# Patient Record
Sex: FEMALE | Race: WHITE | NOT HISPANIC OR LATINO | Employment: OTHER
[De-identification: names, ages, dates, MRNs, and addresses within clinical notes are randomized per-mention and may not be internally consistent; named-entity substitution may affect disease eponyms.]

---

## 2017-01-03 ENCOUNTER — RECORDS - HEALTHEAST (OUTPATIENT)
Dept: ADMINISTRATIVE | Facility: OTHER | Age: 77
End: 2017-01-03

## 2017-01-04 ENCOUNTER — AMBULATORY - HEALTHEAST (OUTPATIENT)
Dept: PALLIATIVE MEDICINE | Facility: OTHER | Age: 77
End: 2017-01-04

## 2017-01-04 DIAGNOSIS — M54.50 ACUTE EXACERBATION OF CHRONIC LOW BACK PAIN: ICD-10-CM

## 2017-01-04 DIAGNOSIS — G89.29 ACUTE EXACERBATION OF CHRONIC LOW BACK PAIN: ICD-10-CM

## 2017-01-06 ENCOUNTER — OFFICE VISIT - HEALTHEAST (OUTPATIENT)
Dept: FAMILY MEDICINE | Facility: CLINIC | Age: 77
End: 2017-01-06

## 2017-01-06 DIAGNOSIS — R22.40 LOCALIZED SWELLING OF LOWER LEG: ICD-10-CM

## 2017-01-06 DIAGNOSIS — I10 HYPERTENSION: ICD-10-CM

## 2017-01-06 DIAGNOSIS — R74.8 ELEVATED LIPASE: ICD-10-CM

## 2017-01-06 DIAGNOSIS — F41.9 ANXIETY AND DEPRESSION: ICD-10-CM

## 2017-01-06 DIAGNOSIS — F32.A ANXIETY AND DEPRESSION: ICD-10-CM

## 2017-01-09 ENCOUNTER — COMMUNICATION - HEALTHEAST (OUTPATIENT)
Dept: FAMILY MEDICINE | Facility: CLINIC | Age: 77
End: 2017-01-09

## 2017-01-10 ENCOUNTER — RECORDS - HEALTHEAST (OUTPATIENT)
Dept: ADMINISTRATIVE | Facility: OTHER | Age: 77
End: 2017-01-10

## 2017-01-13 ENCOUNTER — AMBULATORY - HEALTHEAST (OUTPATIENT)
Dept: PALLIATIVE MEDICINE | Facility: OTHER | Age: 77
End: 2017-01-13

## 2017-01-19 ENCOUNTER — RECORDS - HEALTHEAST (OUTPATIENT)
Dept: ADMINISTRATIVE | Facility: OTHER | Age: 77
End: 2017-01-19

## 2017-01-19 ENCOUNTER — COMMUNICATION - HEALTHEAST (OUTPATIENT)
Dept: FAMILY MEDICINE | Facility: CLINIC | Age: 77
End: 2017-01-19

## 2017-01-20 ENCOUNTER — HOSPITAL ENCOUNTER (OUTPATIENT)
Dept: PALLIATIVE MEDICINE | Facility: OTHER | Age: 77
Discharge: HOME | End: 2017-01-20
Attending: ANESTHESIOLOGY

## 2017-01-20 DIAGNOSIS — G89.4 CHRONIC PAIN SYNDROME: ICD-10-CM

## 2017-01-20 ASSESSMENT — MIFFLIN-ST. JEOR: SCORE: 1228.17

## 2017-01-25 ENCOUNTER — COMMUNICATION - HEALTHEAST (OUTPATIENT)
Dept: PALLIATIVE MEDICINE | Facility: OTHER | Age: 77
End: 2017-01-25

## 2017-01-25 DIAGNOSIS — R52 PAIN: ICD-10-CM

## 2017-01-31 ENCOUNTER — AMBULATORY - HEALTHEAST (OUTPATIENT)
Dept: PALLIATIVE MEDICINE | Facility: OTHER | Age: 77
End: 2017-01-31

## 2017-02-02 ENCOUNTER — OFFICE VISIT - HEALTHEAST (OUTPATIENT)
Dept: PHYSICAL THERAPY | Facility: CLINIC | Age: 77
End: 2017-02-02

## 2017-02-02 DIAGNOSIS — R10.9 ABDOMINAL PAIN, UNSPECIFIED LOCATION: ICD-10-CM

## 2017-02-02 DIAGNOSIS — M54.50 LEFT-SIDED LOW BACK PAIN WITHOUT SCIATICA, UNSPECIFIED CHRONICITY: ICD-10-CM

## 2017-02-02 DIAGNOSIS — Z90.49 HISTORY OF CHOLECYSTECTOMY: ICD-10-CM

## 2017-02-02 DIAGNOSIS — Z98.890 HISTORY OF SURGERY: ICD-10-CM

## 2017-02-03 ENCOUNTER — OFFICE VISIT - HEALTHEAST (OUTPATIENT)
Dept: PHYSICAL THERAPY | Facility: CLINIC | Age: 77
End: 2017-02-03

## 2017-02-03 DIAGNOSIS — Z90.49 HISTORY OF CHOLECYSTECTOMY: ICD-10-CM

## 2017-02-03 DIAGNOSIS — M54.50 LEFT-SIDED LOW BACK PAIN WITHOUT SCIATICA, UNSPECIFIED CHRONICITY: ICD-10-CM

## 2017-02-03 DIAGNOSIS — Z98.890 HISTORY OF SURGERY: ICD-10-CM

## 2017-02-03 DIAGNOSIS — R10.9 ABDOMINAL PAIN, UNSPECIFIED LOCATION: ICD-10-CM

## 2017-02-08 ENCOUNTER — RECORDS - HEALTHEAST (OUTPATIENT)
Dept: ADMINISTRATIVE | Facility: OTHER | Age: 77
End: 2017-02-08

## 2017-02-08 ENCOUNTER — AMBULATORY - HEALTHEAST (OUTPATIENT)
Dept: PALLIATIVE MEDICINE | Facility: OTHER | Age: 77
End: 2017-02-08

## 2017-02-09 ENCOUNTER — OFFICE VISIT - HEALTHEAST (OUTPATIENT)
Dept: PHYSICAL THERAPY | Facility: CLINIC | Age: 77
End: 2017-02-09

## 2017-02-09 ENCOUNTER — AMBULATORY - HEALTHEAST (OUTPATIENT)
Dept: PALLIATIVE MEDICINE | Facility: OTHER | Age: 77
End: 2017-02-09

## 2017-02-09 DIAGNOSIS — R10.9 ABDOMINAL PAIN, UNSPECIFIED LOCATION: ICD-10-CM

## 2017-02-09 DIAGNOSIS — M54.50 LEFT-SIDED LOW BACK PAIN WITHOUT SCIATICA, UNSPECIFIED CHRONICITY: ICD-10-CM

## 2017-02-09 DIAGNOSIS — Z98.890 HISTORY OF SURGERY: ICD-10-CM

## 2017-02-09 DIAGNOSIS — Z90.49 HISTORY OF CHOLECYSTECTOMY: ICD-10-CM

## 2017-02-13 ENCOUNTER — AMBULATORY - HEALTHEAST (OUTPATIENT)
Dept: PALLIATIVE MEDICINE | Facility: OTHER | Age: 77
End: 2017-02-13

## 2017-02-14 ENCOUNTER — RECORDS - HEALTHEAST (OUTPATIENT)
Dept: ADMINISTRATIVE | Facility: OTHER | Age: 77
End: 2017-02-14

## 2017-02-15 ENCOUNTER — COMMUNICATION - HEALTHEAST (OUTPATIENT)
Dept: NURSING | Facility: CLINIC | Age: 77
End: 2017-02-15

## 2017-02-16 ENCOUNTER — OFFICE VISIT - HEALTHEAST (OUTPATIENT)
Dept: PHYSICAL THERAPY | Facility: CLINIC | Age: 77
End: 2017-02-16

## 2017-02-16 DIAGNOSIS — Z90.49 HISTORY OF CHOLECYSTECTOMY: ICD-10-CM

## 2017-02-16 DIAGNOSIS — R10.9 ABDOMINAL PAIN, UNSPECIFIED LOCATION: ICD-10-CM

## 2017-02-16 DIAGNOSIS — Z98.890 HISTORY OF SURGERY: ICD-10-CM

## 2017-02-16 DIAGNOSIS — M54.50 LEFT-SIDED LOW BACK PAIN WITHOUT SCIATICA, UNSPECIFIED CHRONICITY: ICD-10-CM

## 2017-02-17 ENCOUNTER — HOSPITAL ENCOUNTER (OUTPATIENT)
Dept: PALLIATIVE MEDICINE | Facility: OTHER | Age: 77
Discharge: HOME | End: 2017-02-17

## 2017-02-17 DIAGNOSIS — G89.4 CHRONIC PAIN SYNDROME: ICD-10-CM

## 2017-02-17 DIAGNOSIS — G90.523 COMPLEX REGIONAL PAIN SYNDROME TYPE 1 OF BOTH LOWER EXTREMITIES: ICD-10-CM

## 2017-02-17 DIAGNOSIS — M54.16 LUMBAR RADICULOPATHY: ICD-10-CM

## 2017-02-17 ASSESSMENT — MIFFLIN-ST. JEOR: SCORE: 1237.25

## 2017-02-20 ENCOUNTER — COMMUNICATION - HEALTHEAST (OUTPATIENT)
Dept: FAMILY MEDICINE | Facility: CLINIC | Age: 77
End: 2017-02-20

## 2017-02-20 DIAGNOSIS — E03.9 HYPOTHYROIDISM: ICD-10-CM

## 2017-02-21 ENCOUNTER — COMMUNICATION - HEALTHEAST (OUTPATIENT)
Dept: FAMILY MEDICINE | Facility: CLINIC | Age: 77
End: 2017-02-21

## 2017-02-21 ENCOUNTER — COMMUNICATION - HEALTHEAST (OUTPATIENT)
Dept: PALLIATIVE MEDICINE | Facility: OTHER | Age: 77
End: 2017-02-21

## 2017-02-21 ENCOUNTER — AMBULATORY - HEALTHEAST (OUTPATIENT)
Dept: PALLIATIVE MEDICINE | Facility: OTHER | Age: 77
End: 2017-02-21

## 2017-02-26 ENCOUNTER — COMMUNICATION - HEALTHEAST (OUTPATIENT)
Dept: FAMILY MEDICINE | Facility: CLINIC | Age: 77
End: 2017-02-26

## 2017-02-27 ENCOUNTER — COMMUNICATION - HEALTHEAST (OUTPATIENT)
Dept: LAB | Facility: CLINIC | Age: 77
End: 2017-02-27

## 2017-02-27 ENCOUNTER — COMMUNICATION - HEALTHEAST (OUTPATIENT)
Dept: PALLIATIVE MEDICINE | Facility: OTHER | Age: 77
End: 2017-02-27

## 2017-02-27 DIAGNOSIS — E78.2 MIXED HYPERLIPIDEMIA: ICD-10-CM

## 2017-02-27 DIAGNOSIS — I10 HYPERTENSION: ICD-10-CM

## 2017-02-27 DIAGNOSIS — K85.90 ACUTE PANCREATITIS, UNSPECIFIED COMPLICATION STATUS, UNSPECIFIED PANCREATITIS TYPE: ICD-10-CM

## 2017-02-28 ENCOUNTER — AMBULATORY - HEALTHEAST (OUTPATIENT)
Dept: LAB | Facility: CLINIC | Age: 77
End: 2017-02-28

## 2017-02-28 ENCOUNTER — HOSPITAL ENCOUNTER (OUTPATIENT)
Dept: PALLIATIVE MEDICINE | Facility: OTHER | Age: 77
Discharge: HOME | End: 2017-02-28
Attending: PAIN MEDICINE

## 2017-02-28 ENCOUNTER — COMMUNICATION - HEALTHEAST (OUTPATIENT)
Dept: LAB | Facility: CLINIC | Age: 77
End: 2017-02-28

## 2017-02-28 ENCOUNTER — COMMUNICATION - HEALTHEAST (OUTPATIENT)
Dept: FAMILY MEDICINE | Facility: CLINIC | Age: 77
End: 2017-02-28

## 2017-02-28 DIAGNOSIS — M79.2 SYMPATHETICALLY MAINTAINED PAIN: ICD-10-CM

## 2017-02-28 DIAGNOSIS — R52 VISCERAL PAIN: ICD-10-CM

## 2017-02-28 DIAGNOSIS — M54.16 LUMBAR RADICULOPATHY: ICD-10-CM

## 2017-02-28 DIAGNOSIS — K85.90 ACUTE PANCREATITIS, UNSPECIFIED COMPLICATION STATUS, UNSPECIFIED PANCREATITIS TYPE: ICD-10-CM

## 2017-02-28 DIAGNOSIS — E78.2 MIXED HYPERLIPIDEMIA: ICD-10-CM

## 2017-02-28 LAB
CHOLEST SERPL-MCNC: 258 MG/DL
FASTING STATUS PATIENT QL REPORTED: YES
HDLC SERPL-MCNC: 84 MG/DL
LDLC SERPL CALC-MCNC: 159 MG/DL
TRIGL SERPL-MCNC: 77 MG/DL

## 2017-02-28 ASSESSMENT — MIFFLIN-ST. JEOR: SCORE: 1259.93

## 2017-03-01 ENCOUNTER — OFFICE VISIT - HEALTHEAST (OUTPATIENT)
Dept: FAMILY MEDICINE | Facility: CLINIC | Age: 77
End: 2017-03-01

## 2017-03-01 DIAGNOSIS — G89.4 CHRONIC PAIN SYNDROME: ICD-10-CM

## 2017-03-01 DIAGNOSIS — K85.90 ACUTE PANCREATITIS, UNSPECIFIED COMPLICATION STATUS, UNSPECIFIED PANCREATITIS TYPE: ICD-10-CM

## 2017-03-01 DIAGNOSIS — F32.9 MAJOR DEPRESSION: ICD-10-CM

## 2017-03-01 DIAGNOSIS — G47.00 INSOMNIA: ICD-10-CM

## 2017-03-01 ASSESSMENT — MIFFLIN-ST. JEOR: SCORE: 1232.71

## 2017-03-02 ENCOUNTER — COMMUNICATION - HEALTHEAST (OUTPATIENT)
Dept: PALLIATIVE MEDICINE | Facility: OTHER | Age: 77
End: 2017-03-02

## 2017-03-03 ENCOUNTER — HOSPITAL ENCOUNTER (OUTPATIENT)
Dept: PALLIATIVE MEDICINE | Facility: OTHER | Age: 77
Discharge: HOME | End: 2017-03-03
Attending: ANESTHESIOLOGY

## 2017-03-03 DIAGNOSIS — G90.511 COMPLEX REGIONAL PAIN SYNDROME TYPE 1 OF RIGHT UPPER EXTREMITY: ICD-10-CM

## 2017-03-03 ASSESSMENT — MIFFLIN-ST. JEOR: SCORE: 1232.71

## 2017-03-06 ENCOUNTER — AMBULATORY - HEALTHEAST (OUTPATIENT)
Dept: PALLIATIVE MEDICINE | Facility: OTHER | Age: 77
End: 2017-03-06

## 2017-03-06 ENCOUNTER — COMMUNICATION - HEALTHEAST (OUTPATIENT)
Dept: FAMILY MEDICINE | Facility: CLINIC | Age: 77
End: 2017-03-06

## 2017-03-06 ENCOUNTER — RECORDS - HEALTHEAST (OUTPATIENT)
Dept: ADMINISTRATIVE | Facility: OTHER | Age: 77
End: 2017-03-06

## 2017-03-08 ENCOUNTER — COMMUNICATION - HEALTHEAST (OUTPATIENT)
Dept: PALLIATIVE MEDICINE | Facility: OTHER | Age: 77
End: 2017-03-08

## 2017-03-09 ENCOUNTER — HOSPITAL ENCOUNTER (OUTPATIENT)
Dept: PALLIATIVE MEDICINE | Facility: OTHER | Age: 77
Discharge: HOME | End: 2017-03-09
Attending: PAIN MEDICINE

## 2017-03-09 DIAGNOSIS — G90.511 COMPLEX REGIONAL PAIN SYNDROME TYPE 1 OF RIGHT UPPER EXTREMITY: ICD-10-CM

## 2017-03-09 DIAGNOSIS — M54.16 LUMBAR RADICULOPATHY: ICD-10-CM

## 2017-03-13 ENCOUNTER — COMMUNICATION - HEALTHEAST (OUTPATIENT)
Dept: PALLIATIVE MEDICINE | Facility: OTHER | Age: 77
End: 2017-03-13

## 2017-03-14 ENCOUNTER — COMMUNICATION - HEALTHEAST (OUTPATIENT)
Dept: PALLIATIVE MEDICINE | Facility: OTHER | Age: 77
End: 2017-03-14

## 2017-03-15 ENCOUNTER — HOSPITAL ENCOUNTER (OUTPATIENT)
Dept: PALLIATIVE MEDICINE | Facility: OTHER | Age: 77
Discharge: HOME | End: 2017-03-15
Attending: PAIN MEDICINE

## 2017-03-15 DIAGNOSIS — G90.50 REFLEX SYMPATHETIC DYSTROPHY: ICD-10-CM

## 2017-03-15 DIAGNOSIS — G90.511 COMPLEX REGIONAL PAIN SYNDROME TYPE 1 OF RIGHT UPPER EXTREMITY: ICD-10-CM

## 2017-03-15 ASSESSMENT — MIFFLIN-ST. JEOR: SCORE: 1232.71

## 2017-04-10 ENCOUNTER — COMMUNICATION - HEALTHEAST (OUTPATIENT)
Dept: NURSING | Facility: CLINIC | Age: 77
End: 2017-04-10

## 2017-04-14 ENCOUNTER — HOSPITAL ENCOUNTER (OUTPATIENT)
Dept: PALLIATIVE MEDICINE | Facility: OTHER | Age: 77
Discharge: HOME | End: 2017-04-14

## 2017-04-14 DIAGNOSIS — G90.50 REFLEX SYMPATHETIC DYSTROPHY: ICD-10-CM

## 2017-04-14 ASSESSMENT — MIFFLIN-ST. JEOR: SCORE: 1219.1

## 2017-04-17 ENCOUNTER — COMMUNICATION - HEALTHEAST (OUTPATIENT)
Dept: NURSING | Facility: CLINIC | Age: 77
End: 2017-04-17

## 2017-04-17 ENCOUNTER — COMMUNICATION - HEALTHEAST (OUTPATIENT)
Dept: PALLIATIVE MEDICINE | Facility: OTHER | Age: 77
End: 2017-04-17

## 2017-04-21 ENCOUNTER — AMBULATORY - HEALTHEAST (OUTPATIENT)
Dept: PALLIATIVE MEDICINE | Facility: OTHER | Age: 77
End: 2017-04-21

## 2017-04-21 ENCOUNTER — COMMUNICATION - HEALTHEAST (OUTPATIENT)
Dept: NURSING | Facility: CLINIC | Age: 77
End: 2017-04-21

## 2017-04-21 ENCOUNTER — HOSPITAL ENCOUNTER (OUTPATIENT)
Dept: PALLIATIVE MEDICINE | Facility: OTHER | Age: 77
Discharge: HOME | End: 2017-04-21
Attending: ANESTHESIOLOGY

## 2017-04-21 DIAGNOSIS — G89.4 CHRONIC PAIN SYNDROME: ICD-10-CM

## 2017-04-21 DIAGNOSIS — G90.50 REFLEX SYMPATHETIC DYSTROPHY: ICD-10-CM

## 2017-04-21 DIAGNOSIS — M79.2 SYMPATHETICALLY MAINTAINED PAIN: ICD-10-CM

## 2017-04-21 ASSESSMENT — MIFFLIN-ST. JEOR: SCORE: 1219.1

## 2017-04-23 ENCOUNTER — COMMUNICATION - HEALTHEAST (OUTPATIENT)
Dept: SCHEDULING | Facility: CLINIC | Age: 77
End: 2017-04-23

## 2017-04-23 DIAGNOSIS — G89.4 CHRONIC PAIN SYNDROME: ICD-10-CM

## 2017-04-24 ENCOUNTER — HOSPITAL ENCOUNTER (OUTPATIENT)
Dept: PALLIATIVE MEDICINE | Facility: OTHER | Age: 77
Discharge: HOME | End: 2017-04-24
Attending: PAIN MEDICINE

## 2017-04-24 ENCOUNTER — AMBULATORY - HEALTHEAST (OUTPATIENT)
Dept: FAMILY MEDICINE | Facility: CLINIC | Age: 77
End: 2017-04-24

## 2017-04-24 ENCOUNTER — OFFICE VISIT - HEALTHEAST (OUTPATIENT)
Dept: FAMILY MEDICINE | Facility: CLINIC | Age: 77
End: 2017-04-24

## 2017-04-24 DIAGNOSIS — G89.4 CHRONIC PAIN SYNDROME: ICD-10-CM

## 2017-04-24 DIAGNOSIS — M79.2 SYMPATHETICALLY MAINTAINED PAIN: ICD-10-CM

## 2017-04-24 DIAGNOSIS — F41.9 ANXIETY AND DEPRESSION: ICD-10-CM

## 2017-04-24 DIAGNOSIS — I10 HYPERTENSION: ICD-10-CM

## 2017-04-24 DIAGNOSIS — R06.83 SNORING: ICD-10-CM

## 2017-04-24 DIAGNOSIS — F32.A ANXIETY AND DEPRESSION: ICD-10-CM

## 2017-04-24 DIAGNOSIS — N18.31 CHRONIC KIDNEY DISEASE (CKD) STAGE G3A/A1, MODERATELY DECREASED GLOMERULAR FILTRATION RATE (GFR) BETWEEN 45-59 ML/MIN/1.73 SQUARE METER AND ALBUMINURIA CREATININE RATIO LESS THAN 30 MG/G (H): ICD-10-CM

## 2017-04-24 DIAGNOSIS — E78.5 HYPERLIPIDEMIA: ICD-10-CM

## 2017-04-24 ASSESSMENT — MIFFLIN-ST. JEOR
SCORE: 1196.42
SCORE: 1196.42

## 2017-04-25 ENCOUNTER — AMBULATORY - HEALTHEAST (OUTPATIENT)
Dept: FAMILY MEDICINE | Facility: CLINIC | Age: 77
End: 2017-04-25

## 2017-04-25 ENCOUNTER — COMMUNICATION - HEALTHEAST (OUTPATIENT)
Dept: PALLIATIVE MEDICINE | Facility: OTHER | Age: 77
End: 2017-04-25

## 2017-04-25 ENCOUNTER — COMMUNICATION - HEALTHEAST (OUTPATIENT)
Dept: FAMILY MEDICINE | Facility: CLINIC | Age: 77
End: 2017-04-25

## 2017-04-25 ENCOUNTER — AMBULATORY - HEALTHEAST (OUTPATIENT)
Dept: PALLIATIVE MEDICINE | Facility: OTHER | Age: 77
End: 2017-04-25

## 2017-04-25 DIAGNOSIS — R73.09 ELEVATED GLUCOSE: ICD-10-CM

## 2017-04-28 ENCOUNTER — HOSPITAL ENCOUNTER (OUTPATIENT)
Dept: PALLIATIVE MEDICINE | Facility: OTHER | Age: 77
Discharge: HOME | End: 2017-04-28

## 2017-04-28 ENCOUNTER — RECORDS - HEALTHEAST (OUTPATIENT)
Dept: ADMINISTRATIVE | Facility: OTHER | Age: 77
End: 2017-04-28

## 2017-04-28 DIAGNOSIS — G90.50 REFLEX SYMPATHETIC DYSTROPHY: ICD-10-CM

## 2017-04-28 ASSESSMENT — MIFFLIN-ST. JEOR: SCORE: 1182.82

## 2017-05-02 ENCOUNTER — AMBULATORY - HEALTHEAST (OUTPATIENT)
Dept: LAB | Facility: CLINIC | Age: 77
End: 2017-05-02

## 2017-05-02 ENCOUNTER — RECORDS - HEALTHEAST (OUTPATIENT)
Dept: ADMINISTRATIVE | Facility: OTHER | Age: 77
End: 2017-05-02

## 2017-05-02 DIAGNOSIS — I10 HYPERTENSION: ICD-10-CM

## 2017-05-03 ENCOUNTER — COMMUNICATION - HEALTHEAST (OUTPATIENT)
Dept: FAMILY MEDICINE | Facility: CLINIC | Age: 77
End: 2017-05-03

## 2017-05-03 ENCOUNTER — OFFICE VISIT - HEALTHEAST (OUTPATIENT)
Dept: FAMILY MEDICINE | Facility: CLINIC | Age: 77
End: 2017-05-03

## 2017-05-03 DIAGNOSIS — I10 HYPERTENSION: ICD-10-CM

## 2017-05-03 DIAGNOSIS — F32.A ANXIETY AND DEPRESSION: ICD-10-CM

## 2017-05-03 DIAGNOSIS — F41.9 ANXIETY AND DEPRESSION: ICD-10-CM

## 2017-05-03 DIAGNOSIS — N18.31 CHRONIC KIDNEY DISEASE (CKD) STAGE G3A/A1, MODERATELY DECREASED GLOMERULAR FILTRATION RATE (GFR) BETWEEN 45-59 ML/MIN/1.73 SQUARE METER AND ALBUMINURIA CREATININE RATIO LESS THAN 30 MG/G (H): ICD-10-CM

## 2017-05-03 DIAGNOSIS — R19.7 DIARRHEA: ICD-10-CM

## 2017-05-04 ENCOUNTER — COMMUNICATION - HEALTHEAST (OUTPATIENT)
Dept: PALLIATIVE MEDICINE | Facility: OTHER | Age: 77
End: 2017-05-04

## 2017-05-04 ENCOUNTER — AMBULATORY - HEALTHEAST (OUTPATIENT)
Dept: PALLIATIVE MEDICINE | Facility: OTHER | Age: 77
End: 2017-05-04

## 2017-05-04 ENCOUNTER — RECORDS - HEALTHEAST (OUTPATIENT)
Dept: ADMINISTRATIVE | Facility: OTHER | Age: 77
End: 2017-05-04

## 2017-05-04 LAB
Lab: 24 H
METANEPHRINE, U - HISTORICAL: 179 MCG/24 H
NORMETANEPHRINE, U - HISTORICAL: 190 MCG/24 H
SPECIMEN VOL UR: 900 ML
TOTAL METANEPHRINES, U: 369 MCG/24 H

## 2017-05-05 ENCOUNTER — AMBULATORY - HEALTHEAST (OUTPATIENT)
Dept: PALLIATIVE MEDICINE | Facility: OTHER | Age: 77
End: 2017-05-05

## 2017-05-08 ENCOUNTER — HOSPITAL ENCOUNTER (OUTPATIENT)
Dept: PALLIATIVE MEDICINE | Facility: OTHER | Age: 77
Discharge: HOME | End: 2017-05-08
Attending: PAIN MEDICINE

## 2017-05-08 ENCOUNTER — COMMUNICATION - HEALTHEAST (OUTPATIENT)
Dept: NURSING | Facility: CLINIC | Age: 77
End: 2017-05-08

## 2017-05-08 DIAGNOSIS — M79.2 SYMPATHETICALLY MAINTAINED PAIN: ICD-10-CM

## 2017-05-08 ASSESSMENT — MIFFLIN-ST. JEOR: SCORE: 1214.57

## 2017-05-09 ENCOUNTER — COMMUNICATION - HEALTHEAST (OUTPATIENT)
Dept: FAMILY MEDICINE | Facility: CLINIC | Age: 77
End: 2017-05-09

## 2017-05-09 ENCOUNTER — COMMUNICATION - HEALTHEAST (OUTPATIENT)
Dept: PALLIATIVE MEDICINE | Facility: OTHER | Age: 77
End: 2017-05-09

## 2017-05-10 ENCOUNTER — COMMUNICATION - HEALTHEAST (OUTPATIENT)
Dept: FAMILY MEDICINE | Facility: CLINIC | Age: 77
End: 2017-05-10

## 2017-05-10 DIAGNOSIS — F32.A ANXIETY AND DEPRESSION: ICD-10-CM

## 2017-05-10 DIAGNOSIS — F41.9 ANXIETY AND DEPRESSION: ICD-10-CM

## 2017-05-11 ENCOUNTER — COMMUNICATION - HEALTHEAST (OUTPATIENT)
Dept: PALLIATIVE MEDICINE | Facility: OTHER | Age: 77
End: 2017-05-11

## 2017-05-11 ENCOUNTER — COMMUNICATION - HEALTHEAST (OUTPATIENT)
Dept: FAMILY MEDICINE | Facility: CLINIC | Age: 77
End: 2017-05-11

## 2017-05-11 ENCOUNTER — HOSPITAL ENCOUNTER (OUTPATIENT)
Dept: PALLIATIVE MEDICINE | Facility: OTHER | Age: 77
Discharge: HOME | End: 2017-05-11

## 2017-05-11 DIAGNOSIS — G90.50 REFLEX SYMPATHETIC DYSTROPHY: ICD-10-CM

## 2017-05-11 DIAGNOSIS — E78.5 HYPERLIPIDEMIA: ICD-10-CM

## 2017-05-11 ASSESSMENT — MIFFLIN-ST. JEOR: SCORE: 1205.5

## 2017-05-15 ENCOUNTER — HOSPITAL ENCOUNTER (OUTPATIENT)
Dept: PALLIATIVE MEDICINE | Facility: OTHER | Age: 77
Discharge: HOME | End: 2017-05-15
Attending: PAIN MEDICINE

## 2017-05-15 ENCOUNTER — COMMUNICATION - HEALTHEAST (OUTPATIENT)
Dept: FAMILY MEDICINE | Facility: CLINIC | Age: 77
End: 2017-05-15

## 2017-05-15 DIAGNOSIS — M79.2 SYMPATHETICALLY MAINTAINED PAIN: ICD-10-CM

## 2017-05-15 ASSESSMENT — MIFFLIN-ST. JEOR: SCORE: 1197.57

## 2017-05-16 ENCOUNTER — COMMUNICATION - HEALTHEAST (OUTPATIENT)
Dept: PALLIATIVE MEDICINE | Facility: OTHER | Age: 77
End: 2017-05-16

## 2017-05-17 ENCOUNTER — OFFICE VISIT - HEALTHEAST (OUTPATIENT)
Dept: FAMILY MEDICINE | Facility: CLINIC | Age: 77
End: 2017-05-17

## 2017-05-17 DIAGNOSIS — Z01.810 PRE-OPERATIVE CARDIOVASCULAR EXAMINATION: ICD-10-CM

## 2017-05-17 DIAGNOSIS — G47.00 INSOMNIA: ICD-10-CM

## 2017-05-17 DIAGNOSIS — E78.5 HYPERLIPIDEMIA: ICD-10-CM

## 2017-05-17 DIAGNOSIS — R74.8 ELEVATED LIPASE: ICD-10-CM

## 2017-05-17 DIAGNOSIS — N18.31 CHRONIC KIDNEY DISEASE (CKD) STAGE G3A/A1, MODERATELY DECREASED GLOMERULAR FILTRATION RATE (GFR) BETWEEN 45-59 ML/MIN/1.73 SQUARE METER AND ALBUMINURIA CREATININE RATIO LESS THAN 30 MG/G (H): ICD-10-CM

## 2017-05-17 DIAGNOSIS — F41.9 ANXIETY AND DEPRESSION: ICD-10-CM

## 2017-05-17 DIAGNOSIS — I10 HYPERTENSION: ICD-10-CM

## 2017-05-17 DIAGNOSIS — E03.9 HYPOTHYROIDISM, UNSPECIFIED TYPE: ICD-10-CM

## 2017-05-17 DIAGNOSIS — F32.A ANXIETY AND DEPRESSION: ICD-10-CM

## 2017-05-17 LAB
ATRIAL RATE - MUSE: 58 BPM
DIASTOLIC BLOOD PRESSURE - MUSE: NORMAL MMHG
INTERPRETATION ECG - MUSE: NORMAL
P AXIS - MUSE: 45 DEGREES
PR INTERVAL - MUSE: 138 MS
QRS DURATION - MUSE: 82 MS
QT - MUSE: 464 MS
QTC - MUSE: 455 MS
R AXIS - MUSE: 21 DEGREES
SYSTOLIC BLOOD PRESSURE - MUSE: NORMAL MMHG
T AXIS - MUSE: 68 DEGREES
VENTRICULAR RATE- MUSE: 58 BPM

## 2017-05-17 ASSESSMENT — MIFFLIN-ST. JEOR: SCORE: 1209.36

## 2017-05-18 ENCOUNTER — RECORDS - HEALTHEAST (OUTPATIENT)
Dept: ADMINISTRATIVE | Facility: OTHER | Age: 77
End: 2017-05-18

## 2017-05-18 ENCOUNTER — COMMUNICATION - HEALTHEAST (OUTPATIENT)
Dept: FAMILY MEDICINE | Facility: CLINIC | Age: 77
End: 2017-05-18

## 2017-05-18 ENCOUNTER — COMMUNICATION - HEALTHEAST (OUTPATIENT)
Dept: PALLIATIVE MEDICINE | Facility: OTHER | Age: 77
End: 2017-05-18

## 2017-05-22 ENCOUNTER — HOSPITAL ENCOUNTER (OUTPATIENT)
Dept: PALLIATIVE MEDICINE | Facility: OTHER | Age: 77
Discharge: HOME | End: 2017-05-22
Attending: PAIN MEDICINE

## 2017-05-22 DIAGNOSIS — M47.816 LUMBAR SPONDYLOSIS: ICD-10-CM

## 2017-05-22 DIAGNOSIS — M79.2 SYMPATHETICALLY MAINTAINED PAIN: ICD-10-CM

## 2017-05-23 ENCOUNTER — OFFICE VISIT - HEALTHEAST (OUTPATIENT)
Dept: FAMILY MEDICINE | Facility: CLINIC | Age: 77
End: 2017-05-23

## 2017-05-23 ENCOUNTER — COMMUNICATION - HEALTHEAST (OUTPATIENT)
Dept: FAMILY MEDICINE | Facility: CLINIC | Age: 77
End: 2017-05-23

## 2017-05-23 ENCOUNTER — COMMUNICATION - HEALTHEAST (OUTPATIENT)
Dept: PALLIATIVE MEDICINE | Facility: OTHER | Age: 77
End: 2017-05-23

## 2017-05-23 DIAGNOSIS — R07.89 ATYPICAL CHEST PAIN: ICD-10-CM

## 2017-05-23 DIAGNOSIS — G47.8 POOR SLEEP PATTERN: ICD-10-CM

## 2017-05-23 DIAGNOSIS — F32.A ANXIETY AND DEPRESSION: ICD-10-CM

## 2017-05-23 DIAGNOSIS — R53.83 FATIGUE: ICD-10-CM

## 2017-05-23 DIAGNOSIS — I10 HYPERTENSION: ICD-10-CM

## 2017-05-23 DIAGNOSIS — F41.9 ANXIETY AND DEPRESSION: ICD-10-CM

## 2017-05-23 DIAGNOSIS — G89.4 CHRONIC PAIN SYNDROME: ICD-10-CM

## 2017-05-30 ENCOUNTER — HOSPITAL ENCOUNTER (OUTPATIENT)
Dept: CARDIOLOGY | Facility: HOSPITAL | Age: 77
Discharge: HOME | End: 2017-05-30
Attending: FAMILY MEDICINE

## 2017-05-30 ENCOUNTER — HOSPITAL ENCOUNTER (OUTPATIENT)
Dept: NUCLEAR MEDICINE | Facility: HOSPITAL | Age: 77
Discharge: HOME | End: 2017-05-30
Attending: FAMILY MEDICINE

## 2017-05-30 DIAGNOSIS — R07.89 ATYPICAL CHEST PAIN: ICD-10-CM

## 2017-05-30 LAB
CV STRESS CURRENT BP HE: NORMAL
CV STRESS CURRENT HR HE: 73
CV STRESS CURRENT HR HE: 75
CV STRESS CURRENT HR HE: 76
CV STRESS CURRENT HR HE: 76
CV STRESS CURRENT HR HE: 78
CV STRESS CURRENT HR HE: 81
CV STRESS CURRENT HR HE: 82
CV STRESS CURRENT HR HE: 86
CV STRESS CURRENT HR HE: 86
CV STRESS CURRENT HR HE: 87
CV STRESS CURRENT HR HE: 88
CV STRESS CURRENT HR HE: 90
CV STRESS CURRENT HR HE: 90
CV STRESS DEVIATION TIME HE: NORMAL
CV STRESS ECHO PERCENT HR HE: NORMAL
CV STRESS EXERCISE STAGE HE: NORMAL
CV STRESS FINAL RESTING BP HE: NORMAL
CV STRESS FINAL RESTING HR HE: 75
CV STRESS MAX HR HE: 91
CV STRESS MAX TREADMILL GRADE HE: 0
CV STRESS MAX TREADMILL SPEED HE: 0
CV STRESS PEAK DIA BP HE: NORMAL
CV STRESS PEAK SYS BP HE: NORMAL
CV STRESS PHASE HE: NORMAL
CV STRESS PROTOCOL HE: NORMAL
CV STRESS RESTING PT POSITION HE: NORMAL
CV STRESS ST DEVIATION AMOUNT HE: NORMAL
CV STRESS ST DEVIATION ELEVATION HE: NORMAL
CV STRESS ST EVELATION AMOUNT HE: NORMAL
CV STRESS TEST TYPE HE: NORMAL
CV STRESS TOTAL STAGE TIME MIN 1 HE: NORMAL
NUC STRESS EJECTION FRACTION: 70 %
STRESS ECHO BASELINE BP: NORMAL
STRESS ECHO BASELINE HR: 65
STRESS ECHO CALCULATED PERCENT HR: 62 %
STRESS ECHO LAST STRESS BP: NORMAL
STRESS ECHO LAST STRESS HR: 86

## 2017-06-01 ENCOUNTER — AMBULATORY - HEALTHEAST (OUTPATIENT)
Dept: PALLIATIVE MEDICINE | Facility: OTHER | Age: 77
End: 2017-06-01

## 2017-06-01 ENCOUNTER — RECORDS - HEALTHEAST (OUTPATIENT)
Dept: ADMINISTRATIVE | Facility: OTHER | Age: 77
End: 2017-06-01

## 2017-06-06 ENCOUNTER — RECORDS - HEALTHEAST (OUTPATIENT)
Dept: ADMINISTRATIVE | Facility: OTHER | Age: 77
End: 2017-06-06

## 2017-06-08 ENCOUNTER — COMMUNICATION - HEALTHEAST (OUTPATIENT)
Dept: PALLIATIVE MEDICINE | Facility: CLINIC | Age: 77
End: 2017-06-08

## 2017-06-09 ENCOUNTER — AMBULATORY - HEALTHEAST (OUTPATIENT)
Dept: PALLIATIVE MEDICINE | Facility: OTHER | Age: 77
End: 2017-06-09

## 2017-06-09 ENCOUNTER — OFFICE VISIT - HEALTHEAST (OUTPATIENT)
Dept: SLEEP MEDICINE | Facility: CLINIC | Age: 77
End: 2017-06-09

## 2017-06-09 DIAGNOSIS — G47.8 SLEEP DYSFUNCTION WITH SLEEP STAGE DISTURBANCE: ICD-10-CM

## 2017-06-09 DIAGNOSIS — G47.34 SLEEP RELATED HYPOXIA: ICD-10-CM

## 2017-06-09 DIAGNOSIS — G47.10 HYPERSOMNIA, UNSPECIFIED: ICD-10-CM

## 2017-06-09 DIAGNOSIS — R06.83 SNORING: ICD-10-CM

## 2017-06-09 ASSESSMENT — MIFFLIN-ST. JEOR: SCORE: 1182.6

## 2017-06-13 ENCOUNTER — HOSPITAL ENCOUNTER (OUTPATIENT)
Dept: PALLIATIVE MEDICINE | Facility: OTHER | Age: 77
Discharge: HOME | End: 2017-06-13
Attending: ANESTHESIOLOGY

## 2017-06-13 DIAGNOSIS — G90.523 COMPLEX REGIONAL PAIN SYNDROME TYPE 1 OF BOTH LOWER EXTREMITIES: ICD-10-CM

## 2017-06-13 ASSESSMENT — MIFFLIN-ST. JEOR: SCORE: 1187.35

## 2017-06-19 ENCOUNTER — HOSPITAL ENCOUNTER (OUTPATIENT)
Dept: PHYSICAL MEDICINE AND REHAB | Facility: CLINIC | Age: 77
Discharge: HOME | End: 2017-06-19
Attending: PHYSICAL MEDICINE & REHABILITATION

## 2017-06-19 ENCOUNTER — HOSPITAL ENCOUNTER (OUTPATIENT)
Dept: RADIOLOGY | Facility: HOSPITAL | Age: 77
Discharge: HOME | End: 2017-06-19
Attending: PHYSICAL MEDICINE & REHABILITATION

## 2017-06-19 DIAGNOSIS — M99.06 SOMATIC DYSFUNCTION OF LOWER EXTREMITY: ICD-10-CM

## 2017-06-19 DIAGNOSIS — M99.03 SOMATIC DYSFUNCTION OF LUMBAR REGION: ICD-10-CM

## 2017-06-19 DIAGNOSIS — M99.01 SOMATIC DYSFUNCTION OF CERVICAL REGION: ICD-10-CM

## 2017-06-19 DIAGNOSIS — M99.08 SOMATIC DYSFUNCTION OF RIB REGION: ICD-10-CM

## 2017-06-19 DIAGNOSIS — M99.07 SOMATIC DYSFUNCTION OF UPPER EXTREMITY: ICD-10-CM

## 2017-06-19 DIAGNOSIS — M99.05 SOMATIC DYSFUNCTION OF PELVIS REGION: ICD-10-CM

## 2017-06-19 DIAGNOSIS — M51.369 DEGENERATIVE LUMBAR DISC: ICD-10-CM

## 2017-06-19 DIAGNOSIS — M54.50 LUMBAR SPINE PAIN: ICD-10-CM

## 2017-06-19 DIAGNOSIS — R51.9 HEADACHE: ICD-10-CM

## 2017-06-19 DIAGNOSIS — M99.02 SOMATIC DYSFUNCTION OF THORACIC REGION: ICD-10-CM

## 2017-06-19 DIAGNOSIS — Z98.890 H/O LAMINECTOMY: ICD-10-CM

## 2017-06-19 DIAGNOSIS — M99.00 SOMATIC DYSFUNCTION OF HEAD REGION: ICD-10-CM

## 2017-06-19 DIAGNOSIS — M79.18 MYOFASCIAL PAIN: ICD-10-CM

## 2017-06-19 DIAGNOSIS — M99.04 SOMATIC DYSFUNCTION OF SACROILIAC JOINT: ICD-10-CM

## 2017-06-19 ASSESSMENT — MIFFLIN-ST. JEOR: SCORE: 1200.96

## 2017-06-23 ENCOUNTER — COMMUNICATION - HEALTHEAST (OUTPATIENT)
Dept: PHYSICAL MEDICINE AND REHAB | Facility: CLINIC | Age: 77
End: 2017-06-23

## 2017-06-27 ENCOUNTER — HOSPITAL ENCOUNTER (OUTPATIENT)
Dept: PALLIATIVE MEDICINE | Facility: OTHER | Age: 77
Discharge: HOME | End: 2017-06-27

## 2017-06-27 DIAGNOSIS — M54.16 LUMBAR RADICULOPATHY: ICD-10-CM

## 2017-06-27 DIAGNOSIS — M54.50 ACUTE RIGHT-SIDED LOW BACK PAIN WITHOUT SCIATICA: ICD-10-CM

## 2017-06-27 DIAGNOSIS — G89.4 CHRONIC PAIN SYNDROME: ICD-10-CM

## 2017-06-27 DIAGNOSIS — G90.50 REFLEX SYMPATHETIC DYSTROPHY: ICD-10-CM

## 2017-06-27 DIAGNOSIS — G90.523 COMPLEX REGIONAL PAIN SYNDROME TYPE 1 OF BOTH LOWER EXTREMITIES: ICD-10-CM

## 2017-06-27 ASSESSMENT — MIFFLIN-ST. JEOR: SCORE: 1200.96

## 2017-07-03 ENCOUNTER — RECORDS - HEALTHEAST (OUTPATIENT)
Dept: SLEEP MEDICINE | Facility: CLINIC | Age: 77
End: 2017-07-03

## 2017-07-03 ENCOUNTER — COMMUNICATION - HEALTHEAST (OUTPATIENT)
Dept: PALLIATIVE MEDICINE | Facility: OTHER | Age: 77
End: 2017-07-03

## 2017-07-03 DIAGNOSIS — G89.4 CHRONIC PAIN SYNDROME: ICD-10-CM

## 2017-07-03 DIAGNOSIS — G90.50 REFLEX SYMPATHETIC DYSTROPHY: ICD-10-CM

## 2017-07-03 DIAGNOSIS — M54.16 LUMBAR RADICULOPATHY: ICD-10-CM

## 2017-07-03 DIAGNOSIS — R06.83 SNORING: ICD-10-CM

## 2017-07-03 DIAGNOSIS — G47.8 OTHER SLEEP DISORDERS: ICD-10-CM

## 2017-07-03 DIAGNOSIS — G47.10 HYPERSOMNIA, UNSPECIFIED: ICD-10-CM

## 2017-07-03 DIAGNOSIS — G47.34 IDIOPATHIC SLEEP RELATED NONOBSTRUCTIVE ALVEOLAR HYPOVENTILATION: ICD-10-CM

## 2017-07-05 ENCOUNTER — COMMUNICATION - HEALTHEAST (OUTPATIENT)
Dept: SCHEDULING | Facility: CLINIC | Age: 77
End: 2017-07-05

## 2017-07-05 ENCOUNTER — HOSPITAL ENCOUNTER (OUTPATIENT)
Dept: PALLIATIVE MEDICINE | Facility: OTHER | Age: 77
Discharge: HOME | End: 2017-07-05

## 2017-07-05 DIAGNOSIS — G89.4 CHRONIC PAIN SYNDROME: ICD-10-CM

## 2017-07-05 DIAGNOSIS — M25.561 BILATERAL KNEE PAIN: ICD-10-CM

## 2017-07-05 DIAGNOSIS — M54.50 ACUTE RIGHT-SIDED LOW BACK PAIN WITHOUT SCIATICA: ICD-10-CM

## 2017-07-05 DIAGNOSIS — M25.562 BILATERAL KNEE PAIN: ICD-10-CM

## 2017-07-05 ASSESSMENT — MIFFLIN-ST. JEOR: SCORE: 1200.96

## 2017-07-06 ENCOUNTER — COMMUNICATION - HEALTHEAST (OUTPATIENT)
Dept: PALLIATIVE MEDICINE | Facility: OTHER | Age: 77
End: 2017-07-06

## 2017-07-07 ENCOUNTER — COMMUNICATION - HEALTHEAST (OUTPATIENT)
Dept: SLEEP MEDICINE | Facility: CLINIC | Age: 77
End: 2017-07-07

## 2017-07-07 DIAGNOSIS — G47.33 OBSTRUCTIVE SLEEP APNEA: ICD-10-CM

## 2017-07-10 ENCOUNTER — HOSPITAL ENCOUNTER (OUTPATIENT)
Dept: PHYSICAL MEDICINE AND REHAB | Facility: CLINIC | Age: 77
Discharge: HOME | End: 2017-07-10
Attending: PHYSICAL MEDICINE & REHABILITATION

## 2017-07-10 DIAGNOSIS — M99.01 SOMATIC DYSFUNCTION OF CERVICAL REGION: ICD-10-CM

## 2017-07-10 DIAGNOSIS — M99.02 SOMATIC DYSFUNCTION OF THORACIC REGION: ICD-10-CM

## 2017-07-10 DIAGNOSIS — M99.00 SOMATIC DYSFUNCTION OF HEAD REGION: ICD-10-CM

## 2017-07-10 DIAGNOSIS — E66.3 OVERWEIGHT: ICD-10-CM

## 2017-07-10 DIAGNOSIS — Q45.3 PANCREATIC ABNORMALITY: ICD-10-CM

## 2017-07-10 DIAGNOSIS — M99.03 SOMATIC DYSFUNCTION OF LUMBAR REGION: ICD-10-CM

## 2017-07-10 DIAGNOSIS — M99.05 SOMATIC DYSFUNCTION OF PELVIS REGION: ICD-10-CM

## 2017-07-10 DIAGNOSIS — M99.07 SOMATIC DYSFUNCTION OF UPPER EXTREMITY: ICD-10-CM

## 2017-07-10 DIAGNOSIS — M99.08 SOMATIC DYSFUNCTION OF RIB REGION: ICD-10-CM

## 2017-07-10 DIAGNOSIS — M79.18 MYOFASCIAL MUSCLE PAIN: ICD-10-CM

## 2017-07-10 DIAGNOSIS — M99.06 SOMATIC DYSFUNCTION OF LOWER EXTREMITY: ICD-10-CM

## 2017-07-10 DIAGNOSIS — R51.9 HEADACHE: ICD-10-CM

## 2017-07-10 DIAGNOSIS — M99.04 SOMATIC DYSFUNCTION OF SACROILIAC JOINT: ICD-10-CM

## 2017-07-10 ASSESSMENT — MIFFLIN-ST. JEOR: SCORE: 1200.96

## 2017-07-11 ENCOUNTER — COMMUNICATION - HEALTHEAST (OUTPATIENT)
Dept: PALLIATIVE MEDICINE | Facility: OTHER | Age: 77
End: 2017-07-11

## 2017-07-11 DIAGNOSIS — G90.50 REFLEX SYMPATHETIC DYSTROPHY: ICD-10-CM

## 2017-07-11 DIAGNOSIS — M54.16 LUMBAR RADICULOPATHY: ICD-10-CM

## 2017-07-11 DIAGNOSIS — G89.4 CHRONIC PAIN SYNDROME: ICD-10-CM

## 2017-07-12 ENCOUNTER — COMMUNICATION - HEALTHEAST (OUTPATIENT)
Dept: PALLIATIVE MEDICINE | Facility: OTHER | Age: 77
End: 2017-07-12

## 2017-07-12 ENCOUNTER — OFFICE VISIT - HEALTHEAST (OUTPATIENT)
Dept: FAMILY MEDICINE | Facility: CLINIC | Age: 77
End: 2017-07-12

## 2017-07-12 ENCOUNTER — HOSPITAL ENCOUNTER (OUTPATIENT)
Dept: PALLIATIVE MEDICINE | Facility: OTHER | Age: 77
Discharge: HOME | End: 2017-07-12
Attending: PAIN MEDICINE

## 2017-07-12 DIAGNOSIS — G90.50 REFLEX SYMPATHETIC DYSTROPHY: ICD-10-CM

## 2017-07-12 DIAGNOSIS — G89.4 CHRONIC PAIN SYNDROME: ICD-10-CM

## 2017-07-12 DIAGNOSIS — E78.2 MIXED HYPERLIPIDEMIA: ICD-10-CM

## 2017-07-12 DIAGNOSIS — K85.90 ACUTE PANCREATITIS, UNSPECIFIED COMPLICATION STATUS, UNSPECIFIED PANCREATITIS TYPE: ICD-10-CM

## 2017-07-12 DIAGNOSIS — R11.2 NAUSEA & VOMITING: ICD-10-CM

## 2017-07-12 DIAGNOSIS — M79.2 SYMPATHETICALLY MAINTAINED PAIN: ICD-10-CM

## 2017-07-12 DIAGNOSIS — M25.562 BILATERAL KNEE PAIN: ICD-10-CM

## 2017-07-12 DIAGNOSIS — F32.A ANXIETY AND DEPRESSION: ICD-10-CM

## 2017-07-12 DIAGNOSIS — M25.561 BILATERAL KNEE PAIN: ICD-10-CM

## 2017-07-12 DIAGNOSIS — F41.9 ANXIETY AND DEPRESSION: ICD-10-CM

## 2017-07-12 LAB
CHOLEST SERPL-MCNC: 203 MG/DL
FASTING STATUS PATIENT QL REPORTED: ABNORMAL
HBA1C MFR BLD: 5.2 % (ref 3.5–6)
HDLC SERPL-MCNC: 69 MG/DL
LDLC SERPL CALC-MCNC: 115 MG/DL
TRIGL SERPL-MCNC: 97 MG/DL

## 2017-07-12 ASSESSMENT — MIFFLIN-ST. JEOR: SCORE: 1200.96

## 2017-07-17 ENCOUNTER — COMMUNICATION - HEALTHEAST (OUTPATIENT)
Dept: SCHEDULING | Facility: CLINIC | Age: 77
End: 2017-07-17

## 2017-07-17 ENCOUNTER — COMMUNICATION - HEALTHEAST (OUTPATIENT)
Dept: FAMILY MEDICINE | Facility: CLINIC | Age: 77
End: 2017-07-17

## 2017-07-19 ENCOUNTER — AMBULATORY - HEALTHEAST (OUTPATIENT)
Dept: SLEEP MEDICINE | Facility: CLINIC | Age: 77
End: 2017-07-19

## 2017-07-19 ENCOUNTER — COMMUNICATION - HEALTHEAST (OUTPATIENT)
Dept: SLEEP MEDICINE | Facility: CLINIC | Age: 77
End: 2017-07-19

## 2017-07-19 DIAGNOSIS — G47.33 OBSTRUCTIVE SLEEP APNEA: ICD-10-CM

## 2017-07-20 ENCOUNTER — COMMUNICATION - HEALTHEAST (OUTPATIENT)
Dept: NURSING | Facility: CLINIC | Age: 77
End: 2017-07-20

## 2017-07-20 ENCOUNTER — OFFICE VISIT - HEALTHEAST (OUTPATIENT)
Dept: FAMILY MEDICINE | Facility: CLINIC | Age: 77
End: 2017-07-20

## 2017-07-20 DIAGNOSIS — G47.30 SLEEP APNEA: ICD-10-CM

## 2017-07-20 ASSESSMENT — MIFFLIN-ST. JEOR: SCORE: 1195.52

## 2017-07-21 ENCOUNTER — OFFICE VISIT - HEALTHEAST (OUTPATIENT)
Dept: SLEEP MEDICINE | Facility: CLINIC | Age: 77
End: 2017-07-21

## 2017-07-21 ENCOUNTER — COMMUNICATION - HEALTHEAST (OUTPATIENT)
Dept: PALLIATIVE MEDICINE | Facility: OTHER | Age: 77
End: 2017-07-21

## 2017-07-21 ENCOUNTER — HOSPITAL ENCOUNTER (OUTPATIENT)
Dept: PALLIATIVE MEDICINE | Facility: OTHER | Age: 77
Discharge: HOME | End: 2017-07-21

## 2017-07-21 ENCOUNTER — RECORDS - HEALTHEAST (OUTPATIENT)
Dept: ADMINISTRATIVE | Facility: OTHER | Age: 77
End: 2017-07-21

## 2017-07-21 DIAGNOSIS — K85.90 PANCREATITIS: ICD-10-CM

## 2017-07-21 DIAGNOSIS — G90.50 REFLEX SYMPATHETIC DYSTROPHY: ICD-10-CM

## 2017-07-21 DIAGNOSIS — G47.8 SLEEP DYSFUNCTION WITH SLEEP STAGE DISTURBANCE: ICD-10-CM

## 2017-07-21 DIAGNOSIS — M54.16 LUMBAR RADICULOPATHY: ICD-10-CM

## 2017-07-21 DIAGNOSIS — G89.4 CHRONIC PAIN SYNDROME: ICD-10-CM

## 2017-07-21 DIAGNOSIS — G47.33 OSA (OBSTRUCTIVE SLEEP APNEA): ICD-10-CM

## 2017-07-21 DIAGNOSIS — G47.10 HYPERSOMNIA, UNSPECIFIED: ICD-10-CM

## 2017-07-21 ASSESSMENT — MIFFLIN-ST. JEOR
SCORE: 1182.82
SCORE: 1203.68

## 2017-07-28 ENCOUNTER — COMMUNICATION - HEALTHEAST (OUTPATIENT)
Dept: SLEEP MEDICINE | Facility: CLINIC | Age: 77
End: 2017-07-28

## 2017-07-28 ENCOUNTER — RECORDS - HEALTHEAST (OUTPATIENT)
Dept: ADMINISTRATIVE | Facility: OTHER | Age: 77
End: 2017-07-28

## 2017-08-01 ENCOUNTER — RECORDS - HEALTHEAST (OUTPATIENT)
Dept: ADMINISTRATIVE | Facility: OTHER | Age: 77
End: 2017-08-01

## 2017-08-02 ENCOUNTER — HOSPITAL ENCOUNTER (OUTPATIENT)
Dept: PHYSICAL MEDICINE AND REHAB | Facility: CLINIC | Age: 77
Discharge: HOME | End: 2017-08-02
Attending: PHYSICAL MEDICINE & REHABILITATION

## 2017-08-02 ENCOUNTER — RECORDS - HEALTHEAST (OUTPATIENT)
Dept: ADMINISTRATIVE | Facility: OTHER | Age: 77
End: 2017-08-02

## 2017-08-02 DIAGNOSIS — M99.04 SOMATIC DYSFUNCTION OF SACROILIAC JOINT: ICD-10-CM

## 2017-08-02 DIAGNOSIS — M99.03 SOMATIC DYSFUNCTION OF LUMBAR REGION: ICD-10-CM

## 2017-08-02 DIAGNOSIS — M62.569 CALF ATROPHY: ICD-10-CM

## 2017-08-02 DIAGNOSIS — M99.00 SOMATIC DYSFUNCTION OF HEAD REGION: ICD-10-CM

## 2017-08-02 DIAGNOSIS — M99.06 SOMATIC DYSFUNCTION OF LOWER EXTREMITY: ICD-10-CM

## 2017-08-02 DIAGNOSIS — R51.9 HEADACHE: ICD-10-CM

## 2017-08-02 DIAGNOSIS — M79.18 MYOFASCIAL MUSCLE PAIN: ICD-10-CM

## 2017-08-02 DIAGNOSIS — M99.07 SOMATIC DYSFUNCTION OF UPPER EXTREMITY: ICD-10-CM

## 2017-08-02 DIAGNOSIS — M99.01 SOMATIC DYSFUNCTION OF CERVICAL REGION: ICD-10-CM

## 2017-08-02 DIAGNOSIS — M99.02 SOMATIC DYSFUNCTION OF THORACIC REGION: ICD-10-CM

## 2017-08-02 DIAGNOSIS — M99.05 SOMATIC DYSFUNCTION OF PELVIS REGION: ICD-10-CM

## 2017-08-02 DIAGNOSIS — M99.08 SOMATIC DYSFUNCTION OF RIB REGION: ICD-10-CM

## 2017-08-02 ASSESSMENT — MIFFLIN-ST. JEOR: SCORE: 1182.82

## 2017-08-10 ENCOUNTER — RECORDS - HEALTHEAST (OUTPATIENT)
Dept: ADMINISTRATIVE | Facility: OTHER | Age: 77
End: 2017-08-10

## 2017-08-18 ENCOUNTER — COMMUNICATION - HEALTHEAST (OUTPATIENT)
Dept: NURSING | Facility: CLINIC | Age: 77
End: 2017-08-18

## 2017-08-22 ENCOUNTER — OFFICE VISIT - HEALTHEAST (OUTPATIENT)
Dept: FAMILY MEDICINE | Facility: CLINIC | Age: 77
End: 2017-08-22

## 2017-08-22 DIAGNOSIS — N64.4 BREAST PAIN, LEFT: ICD-10-CM

## 2017-08-22 DIAGNOSIS — R11.2 NAUSEA & VOMITING: ICD-10-CM

## 2017-08-22 DIAGNOSIS — F41.9 ANXIETY AND DEPRESSION: ICD-10-CM

## 2017-08-22 DIAGNOSIS — F32.A ANXIETY AND DEPRESSION: ICD-10-CM

## 2017-08-22 DIAGNOSIS — I10 HYPERTENSION: ICD-10-CM

## 2017-08-25 ENCOUNTER — COMMUNICATION - HEALTHEAST (OUTPATIENT)
Dept: SLEEP MEDICINE | Facility: CLINIC | Age: 77
End: 2017-08-25

## 2017-08-28 ENCOUNTER — RECORDS - HEALTHEAST (OUTPATIENT)
Dept: ADMINISTRATIVE | Facility: OTHER | Age: 77
End: 2017-08-28

## 2017-08-28 ENCOUNTER — COMMUNICATION - HEALTHEAST (OUTPATIENT)
Dept: PALLIATIVE MEDICINE | Facility: OTHER | Age: 77
End: 2017-08-28

## 2017-08-28 ENCOUNTER — HOSPITAL ENCOUNTER (OUTPATIENT)
Dept: PALLIATIVE MEDICINE | Facility: OTHER | Age: 77
Discharge: HOME | End: 2017-08-28
Attending: PHYSICAL MEDICINE & REHABILITATION

## 2017-08-28 ENCOUNTER — HOSPITAL ENCOUNTER (OUTPATIENT)
Dept: NUTRITION | Facility: HOSPITAL | Age: 77
Discharge: HOME | End: 2017-08-28
Attending: PHYSICAL MEDICINE & REHABILITATION

## 2017-08-28 DIAGNOSIS — G89.4 CHRONIC PAIN SYNDROME: ICD-10-CM

## 2017-08-28 DIAGNOSIS — K85.90 ACUTE PANCREATITIS, UNSPECIFIED COMPLICATION STATUS, UNSPECIFIED PANCREATITIS TYPE: ICD-10-CM

## 2017-08-28 DIAGNOSIS — E66.3 OVERWEIGHT: ICD-10-CM

## 2017-08-28 DIAGNOSIS — M54.16 LUMBAR RADICULOPATHY: ICD-10-CM

## 2017-08-28 DIAGNOSIS — G90.50 REFLEX SYMPATHETIC DYSTROPHY: ICD-10-CM

## 2017-08-28 DIAGNOSIS — Q45.3 PANCREATIC ABNORMALITY: ICD-10-CM

## 2017-08-28 ASSESSMENT — MIFFLIN-ST. JEOR: SCORE: 1182.82

## 2017-09-01 ENCOUNTER — RECORDS - HEALTHEAST (OUTPATIENT)
Dept: ADMINISTRATIVE | Facility: OTHER | Age: 77
End: 2017-09-01

## 2017-09-06 ENCOUNTER — RECORDS - HEALTHEAST (OUTPATIENT)
Dept: ADMINISTRATIVE | Facility: OTHER | Age: 77
End: 2017-09-06

## 2017-09-06 ENCOUNTER — HOSPITAL ENCOUNTER (OUTPATIENT)
Dept: MAMMOGRAPHY | Facility: HOSPITAL | Age: 77
Discharge: HOME | End: 2017-09-06
Attending: FAMILY MEDICINE

## 2017-09-06 DIAGNOSIS — N64.4 BREAST PAIN, LEFT: ICD-10-CM

## 2017-09-08 ENCOUNTER — HOSPITAL ENCOUNTER (OUTPATIENT)
Dept: PALLIATIVE MEDICINE | Facility: OTHER | Age: 77
Discharge: HOME | End: 2017-09-08
Attending: ANESTHESIOLOGY

## 2017-09-08 DIAGNOSIS — G89.4 CHRONIC PAIN SYNDROME: ICD-10-CM

## 2017-09-08 ASSESSMENT — MIFFLIN-ST. JEOR: SCORE: 1182.82

## 2017-09-11 ENCOUNTER — HOSPITAL ENCOUNTER (OUTPATIENT)
Dept: PALLIATIVE MEDICINE | Facility: OTHER | Age: 77
Discharge: HOME | End: 2017-09-11
Attending: PHARMACIST

## 2017-09-11 ENCOUNTER — COMMUNICATION - HEALTHEAST (OUTPATIENT)
Dept: NURSING | Facility: CLINIC | Age: 77
End: 2017-09-11

## 2017-09-11 DIAGNOSIS — G89.4 CHRONIC PAIN SYNDROME: ICD-10-CM

## 2017-09-11 ASSESSMENT — MIFFLIN-ST. JEOR: SCORE: 1182.82

## 2017-09-18 ENCOUNTER — OFFICE VISIT - HEALTHEAST (OUTPATIENT)
Dept: SLEEP MEDICINE | Facility: CLINIC | Age: 77
End: 2017-09-18

## 2017-09-18 ENCOUNTER — AMBULATORY - HEALTHEAST (OUTPATIENT)
Dept: SLEEP MEDICINE | Facility: CLINIC | Age: 77
End: 2017-09-18

## 2017-09-18 DIAGNOSIS — G47.10 HYPERSOMNIA, UNSPECIFIED: ICD-10-CM

## 2017-09-18 DIAGNOSIS — G47.8 SLEEP DYSFUNCTION WITH SLEEP STAGE DISTURBANCE: ICD-10-CM

## 2017-09-18 DIAGNOSIS — G47.33 OSA ON CPAP: ICD-10-CM

## 2017-09-19 ENCOUNTER — OFFICE VISIT - HEALTHEAST (OUTPATIENT)
Dept: FAMILY MEDICINE | Facility: CLINIC | Age: 77
End: 2017-09-19

## 2017-09-19 ENCOUNTER — COMMUNICATION - HEALTHEAST (OUTPATIENT)
Dept: FAMILY MEDICINE | Facility: CLINIC | Age: 77
End: 2017-09-19

## 2017-09-19 DIAGNOSIS — I10 HYPERTENSION: ICD-10-CM

## 2017-09-19 DIAGNOSIS — G89.4 CHRONIC PAIN SYNDROME: ICD-10-CM

## 2017-09-19 DIAGNOSIS — Z23 IMMUNIZATION DUE: ICD-10-CM

## 2017-09-19 DIAGNOSIS — M17.0 BILATERAL PRIMARY OSTEOARTHRITIS OF KNEE: ICD-10-CM

## 2017-09-19 DIAGNOSIS — E78.2 MIXED HYPERLIPIDEMIA: ICD-10-CM

## 2017-09-19 DIAGNOSIS — R11.2 NAUSEA & VOMITING: ICD-10-CM

## 2017-09-19 LAB
CHOLEST SERPL-MCNC: 204 MG/DL
FASTING STATUS PATIENT QL REPORTED: NO
HDLC SERPL-MCNC: 71 MG/DL
LDLC SERPL CALC-MCNC: 114 MG/DL
TRIGL SERPL-MCNC: 97 MG/DL

## 2017-09-21 ENCOUNTER — COMMUNICATION - HEALTHEAST (OUTPATIENT)
Dept: NURSING | Facility: CLINIC | Age: 77
End: 2017-09-21

## 2017-09-25 ENCOUNTER — HOSPITAL ENCOUNTER (OUTPATIENT)
Dept: PALLIATIVE MEDICINE | Facility: OTHER | Age: 77
Discharge: HOME | End: 2017-09-25

## 2017-09-25 DIAGNOSIS — G89.4 CHRONIC PAIN SYNDROME: ICD-10-CM

## 2017-09-25 DIAGNOSIS — G90.523 COMPLEX REGIONAL PAIN SYNDROME TYPE 1 OF BOTH LOWER EXTREMITIES: ICD-10-CM

## 2017-09-25 DIAGNOSIS — G90.50 REFLEX SYMPATHETIC DYSTROPHY: ICD-10-CM

## 2017-09-25 DIAGNOSIS — M54.16 LUMBAR RADICULOPATHY: ICD-10-CM

## 2017-09-25 DIAGNOSIS — G90.511 COMPLEX REGIONAL PAIN SYNDROME TYPE 1 OF RIGHT UPPER EXTREMITY: ICD-10-CM

## 2017-09-25 ASSESSMENT — MIFFLIN-ST. JEOR: SCORE: 1182.82

## 2017-09-26 ENCOUNTER — RECORDS - HEALTHEAST (OUTPATIENT)
Dept: ADMINISTRATIVE | Facility: OTHER | Age: 77
End: 2017-09-26

## 2017-09-28 ENCOUNTER — COMMUNICATION - HEALTHEAST (OUTPATIENT)
Dept: SLEEP MEDICINE | Facility: CLINIC | Age: 77
End: 2017-09-28

## 2017-09-29 ENCOUNTER — COMMUNICATION - HEALTHEAST (OUTPATIENT)
Dept: PALLIATIVE MEDICINE | Facility: CLINIC | Age: 77
End: 2017-09-29

## 2017-09-29 ENCOUNTER — COMMUNICATION - HEALTHEAST (OUTPATIENT)
Dept: SCHEDULING | Facility: CLINIC | Age: 77
End: 2017-09-29

## 2017-09-29 DIAGNOSIS — F11.93 WITHDRAWAL FROM OPIOIDS (H): ICD-10-CM

## 2017-10-02 ENCOUNTER — HOSPITAL ENCOUNTER (OUTPATIENT)
Dept: PALLIATIVE MEDICINE | Facility: OTHER | Age: 77
Discharge: HOME | End: 2017-10-02
Attending: PHARMACIST

## 2017-10-02 DIAGNOSIS — G89.4 CHRONIC PAIN SYNDROME: ICD-10-CM

## 2017-10-02 ASSESSMENT — MIFFLIN-ST. JEOR: SCORE: 1182.82

## 2017-10-03 ENCOUNTER — OFFICE VISIT - HEALTHEAST (OUTPATIENT)
Dept: SLEEP MEDICINE | Facility: CLINIC | Age: 77
End: 2017-10-03

## 2017-10-03 ENCOUNTER — COMMUNICATION - HEALTHEAST (OUTPATIENT)
Dept: PALLIATIVE MEDICINE | Facility: OTHER | Age: 77
End: 2017-10-03

## 2017-10-03 DIAGNOSIS — G47.10 HYPERSOMNIA: ICD-10-CM

## 2017-10-03 DIAGNOSIS — G47.8 SLEEP DYSFUNCTION WITH SLEEP STAGE DISTURBANCE: ICD-10-CM

## 2017-10-03 DIAGNOSIS — F11.93 WITHDRAWAL FROM OPIOIDS (H): ICD-10-CM

## 2017-10-03 DIAGNOSIS — G47.33 OSA ON CPAP: ICD-10-CM

## 2017-10-06 ENCOUNTER — COMMUNICATION - HEALTHEAST (OUTPATIENT)
Dept: SLEEP MEDICINE | Facility: CLINIC | Age: 77
End: 2017-10-06

## 2017-10-06 ENCOUNTER — COMMUNICATION - HEALTHEAST (OUTPATIENT)
Dept: FAMILY MEDICINE | Facility: CLINIC | Age: 77
End: 2017-10-06

## 2017-10-07 ENCOUNTER — COMMUNICATION - HEALTHEAST (OUTPATIENT)
Dept: PALLIATIVE MEDICINE | Facility: OTHER | Age: 77
End: 2017-10-07

## 2017-10-07 DIAGNOSIS — F11.93 WITHDRAWAL FROM OPIOIDS (H): ICD-10-CM

## 2017-10-08 ENCOUNTER — COMMUNICATION - HEALTHEAST (OUTPATIENT)
Dept: SCHEDULING | Facility: CLINIC | Age: 77
End: 2017-10-08

## 2017-10-09 ENCOUNTER — COMMUNICATION - HEALTHEAST (OUTPATIENT)
Dept: PALLIATIVE MEDICINE | Facility: OTHER | Age: 77
End: 2017-10-09

## 2017-10-10 ENCOUNTER — COMMUNICATION - HEALTHEAST (OUTPATIENT)
Dept: PALLIATIVE MEDICINE | Facility: OTHER | Age: 77
End: 2017-10-10

## 2017-10-10 DIAGNOSIS — F11.93 WITHDRAWAL FROM OPIOIDS (H): ICD-10-CM

## 2017-10-13 ENCOUNTER — COMMUNICATION - HEALTHEAST (OUTPATIENT)
Dept: FAMILY MEDICINE | Facility: CLINIC | Age: 77
End: 2017-10-13

## 2017-10-16 ENCOUNTER — HOSPITAL ENCOUNTER (OUTPATIENT)
Dept: PALLIATIVE MEDICINE | Facility: OTHER | Age: 77
Discharge: HOME | End: 2017-10-16

## 2017-10-16 DIAGNOSIS — M54.16 LUMBAR RADICULOPATHY: ICD-10-CM

## 2017-10-16 DIAGNOSIS — G90.523 COMPLEX REGIONAL PAIN SYNDROME TYPE 1 OF BOTH LOWER EXTREMITIES: ICD-10-CM

## 2017-10-16 DIAGNOSIS — M77.9 INFLAMMATION AROUND JOINT: ICD-10-CM

## 2017-10-16 DIAGNOSIS — G90.511 COMPLEX REGIONAL PAIN SYNDROME TYPE 1 OF RIGHT UPPER EXTREMITY: ICD-10-CM

## 2017-10-16 ASSESSMENT — MIFFLIN-ST. JEOR: SCORE: 1164.67

## 2017-10-17 ENCOUNTER — COMMUNICATION - HEALTHEAST (OUTPATIENT)
Dept: PALLIATIVE MEDICINE | Facility: OTHER | Age: 77
End: 2017-10-17

## 2017-10-17 ENCOUNTER — AMBULATORY - HEALTHEAST (OUTPATIENT)
Dept: CARE COORDINATION | Facility: CLINIC | Age: 77
End: 2017-10-17

## 2017-10-17 DIAGNOSIS — R52 PAIN: ICD-10-CM

## 2017-10-18 ENCOUNTER — OFFICE VISIT - HEALTHEAST (OUTPATIENT)
Dept: FAMILY MEDICINE | Facility: CLINIC | Age: 77
End: 2017-10-18

## 2017-10-18 ENCOUNTER — AMBULATORY - HEALTHEAST (OUTPATIENT)
Dept: SCHEDULING | Facility: CLINIC | Age: 77
End: 2017-10-18

## 2017-10-18 DIAGNOSIS — Z13.820 SCREENING FOR OSTEOPOROSIS: ICD-10-CM

## 2017-10-18 DIAGNOSIS — G90.511 COMPLEX REGIONAL PAIN SYNDROME TYPE 1 OF RIGHT UPPER EXTREMITY: ICD-10-CM

## 2017-10-18 DIAGNOSIS — G90.523 COMPLEX REGIONAL PAIN SYNDROME TYPE 1 OF BOTH LOWER EXTREMITIES: ICD-10-CM

## 2017-10-18 DIAGNOSIS — N18.31 CHRONIC KIDNEY DISEASE (CKD) STAGE G3A/A1, MODERATELY DECREASED GLOMERULAR FILTRATION RATE (GFR) BETWEEN 45-59 ML/MIN/1.73 SQUARE METER AND ALBUMINURIA CREATININE RATIO LESS THAN 30 MG/G (H): ICD-10-CM

## 2017-10-18 ASSESSMENT — MIFFLIN-ST. JEOR: SCORE: 1165.81

## 2017-10-23 ENCOUNTER — HOSPITAL ENCOUNTER (OUTPATIENT)
Dept: PALLIATIVE MEDICINE | Facility: OTHER | Age: 77
Discharge: HOME | End: 2017-10-23
Attending: ANESTHESIOLOGY

## 2017-10-23 ENCOUNTER — HOSPITAL ENCOUNTER (OUTPATIENT)
Dept: PALLIATIVE MEDICINE | Facility: OTHER | Age: 77
Discharge: HOME | End: 2017-10-23
Attending: PHARMACIST

## 2017-10-23 ENCOUNTER — COMMUNICATION - HEALTHEAST (OUTPATIENT)
Dept: PALLIATIVE MEDICINE | Facility: OTHER | Age: 77
End: 2017-10-23

## 2017-10-23 ENCOUNTER — COMMUNICATION - HEALTHEAST (OUTPATIENT)
Dept: NURSING | Facility: CLINIC | Age: 77
End: 2017-10-23

## 2017-10-23 DIAGNOSIS — G89.4 CHRONIC PAIN SYNDROME: ICD-10-CM

## 2017-10-23 DIAGNOSIS — R52 PAIN: ICD-10-CM

## 2017-10-23 DIAGNOSIS — G90.511 COMPLEX REGIONAL PAIN SYNDROME TYPE 1 OF RIGHT UPPER EXTREMITY: ICD-10-CM

## 2017-10-23 DIAGNOSIS — G90.523 COMPLEX REGIONAL PAIN SYNDROME TYPE 1 OF BOTH LOWER EXTREMITIES: ICD-10-CM

## 2017-10-23 DIAGNOSIS — M54.16 LUMBAR RADICULOPATHY: ICD-10-CM

## 2017-10-23 ASSESSMENT — MIFFLIN-ST. JEOR
SCORE: 1165.81
SCORE: 1165.81

## 2017-10-24 ENCOUNTER — COMMUNICATION - HEALTHEAST (OUTPATIENT)
Dept: SLEEP MEDICINE | Facility: CLINIC | Age: 77
End: 2017-10-24

## 2017-10-24 ENCOUNTER — COMMUNICATION - HEALTHEAST (OUTPATIENT)
Dept: PALLIATIVE MEDICINE | Facility: OTHER | Age: 77
End: 2017-10-24

## 2017-10-24 DIAGNOSIS — R29.818 SUSPECTED SLEEP APNEA: ICD-10-CM

## 2017-10-24 DIAGNOSIS — G47.10 HYPERSOMNIA: ICD-10-CM

## 2017-10-24 DIAGNOSIS — F11.93 WITHDRAWAL FROM OPIOIDS (H): ICD-10-CM

## 2017-10-24 DIAGNOSIS — R06.83 SNORING: ICD-10-CM

## 2017-10-25 ENCOUNTER — RECORDS - HEALTHEAST (OUTPATIENT)
Dept: ADMINISTRATIVE | Facility: OTHER | Age: 77
End: 2017-10-25

## 2017-10-26 ENCOUNTER — COMMUNICATION - HEALTHEAST (OUTPATIENT)
Dept: PALLIATIVE MEDICINE | Facility: OTHER | Age: 77
End: 2017-10-26

## 2017-10-26 DIAGNOSIS — R52 PAIN: ICD-10-CM

## 2017-10-27 ENCOUNTER — COMMUNICATION - HEALTHEAST (OUTPATIENT)
Dept: PALLIATIVE MEDICINE | Facility: OTHER | Age: 77
End: 2017-10-27

## 2017-10-27 ENCOUNTER — OFFICE VISIT - HEALTHEAST (OUTPATIENT)
Dept: FAMILY MEDICINE | Facility: CLINIC | Age: 77
End: 2017-10-27

## 2017-10-27 DIAGNOSIS — I10 ACCELERATED HYPERTENSION: ICD-10-CM

## 2017-10-27 DIAGNOSIS — E78.2 MIXED HYPERLIPIDEMIA: ICD-10-CM

## 2017-10-27 DIAGNOSIS — R52 PAIN: ICD-10-CM

## 2017-10-27 DIAGNOSIS — G89.4 CHRONIC PAIN SYNDROME: ICD-10-CM

## 2017-10-31 ENCOUNTER — COMMUNICATION - HEALTHEAST (OUTPATIENT)
Dept: PALLIATIVE MEDICINE | Facility: OTHER | Age: 77
End: 2017-10-31

## 2017-10-31 DIAGNOSIS — R52 PAIN: ICD-10-CM

## 2017-11-02 ENCOUNTER — COMMUNICATION - HEALTHEAST (OUTPATIENT)
Dept: NURSING | Facility: CLINIC | Age: 77
End: 2017-11-02

## 2017-11-03 ENCOUNTER — COMMUNICATION - HEALTHEAST (OUTPATIENT)
Dept: FAMILY MEDICINE | Facility: CLINIC | Age: 77
End: 2017-11-03

## 2017-11-03 DIAGNOSIS — M25.551 RIGHT HIP PAIN: ICD-10-CM

## 2017-11-03 DIAGNOSIS — M85.80 OSTEOPENIA: ICD-10-CM

## 2017-11-05 ENCOUNTER — COMMUNICATION - HEALTHEAST (OUTPATIENT)
Dept: FAMILY MEDICINE | Facility: CLINIC | Age: 77
End: 2017-11-05

## 2017-11-05 DIAGNOSIS — G44.009 CLUSTER HEADACHE: ICD-10-CM

## 2017-11-06 ENCOUNTER — COMMUNICATION - HEALTHEAST (OUTPATIENT)
Dept: PALLIATIVE MEDICINE | Facility: OTHER | Age: 77
End: 2017-11-06

## 2017-11-06 DIAGNOSIS — G89.4 CHRONIC PAIN SYNDROME: ICD-10-CM

## 2017-11-06 DIAGNOSIS — R52 PAIN: ICD-10-CM

## 2017-11-06 DIAGNOSIS — E78.5 HYPERLIPIDEMIA: ICD-10-CM

## 2017-11-06 DIAGNOSIS — E03.9 HYPOTHYROIDISM, UNSPECIFIED TYPE: ICD-10-CM

## 2017-11-07 ENCOUNTER — COMMUNICATION - HEALTHEAST (OUTPATIENT)
Dept: SCHEDULING | Facility: CLINIC | Age: 77
End: 2017-11-07

## 2017-11-10 ENCOUNTER — COMMUNICATION - HEALTHEAST (OUTPATIENT)
Dept: PALLIATIVE MEDICINE | Facility: OTHER | Age: 77
End: 2017-11-10

## 2017-11-10 DIAGNOSIS — R52 PAIN: ICD-10-CM

## 2017-11-13 ENCOUNTER — HOSPITAL ENCOUNTER (OUTPATIENT)
Dept: PALLIATIVE MEDICINE | Facility: OTHER | Age: 77
Discharge: HOME | End: 2017-11-13

## 2017-11-13 ENCOUNTER — COMMUNICATION - HEALTHEAST (OUTPATIENT)
Dept: NURSING | Facility: CLINIC | Age: 77
End: 2017-11-13

## 2017-11-13 DIAGNOSIS — G90.50 REFLEX SYMPATHETIC DYSTROPHY: ICD-10-CM

## 2017-11-13 DIAGNOSIS — R52 PAIN: ICD-10-CM

## 2017-11-13 DIAGNOSIS — G90.511 COMPLEX REGIONAL PAIN SYNDROME TYPE 1 OF RIGHT UPPER EXTREMITY: ICD-10-CM

## 2017-11-13 DIAGNOSIS — G90.523 COMPLEX REGIONAL PAIN SYNDROME TYPE 1 OF BOTH LOWER EXTREMITIES: ICD-10-CM

## 2017-11-13 DIAGNOSIS — M54.16 LUMBAR RADICULOPATHY: ICD-10-CM

## 2017-11-13 ASSESSMENT — MIFFLIN-ST. JEOR: SCORE: 1165.81

## 2017-11-14 ENCOUNTER — COMMUNICATION - HEALTHEAST (OUTPATIENT)
Dept: PALLIATIVE MEDICINE | Facility: OTHER | Age: 77
End: 2017-11-14

## 2017-11-15 ENCOUNTER — HOSPITAL ENCOUNTER (OUTPATIENT)
Dept: PALLIATIVE MEDICINE | Facility: OTHER | Age: 77
Discharge: HOME | End: 2017-11-15
Attending: PAIN MEDICINE

## 2017-11-15 ENCOUNTER — RECORDS - HEALTHEAST (OUTPATIENT)
Dept: SLEEP MEDICINE | Facility: CLINIC | Age: 77
End: 2017-11-15

## 2017-11-15 ENCOUNTER — RECORDS - HEALTHEAST (OUTPATIENT)
Dept: ADMINISTRATIVE | Facility: OTHER | Age: 77
End: 2017-11-15

## 2017-11-15 DIAGNOSIS — G47.30 SLEEP APNEA, UNSPECIFIED: ICD-10-CM

## 2017-11-15 DIAGNOSIS — M25.562 BILATERAL KNEE PAIN: ICD-10-CM

## 2017-11-15 DIAGNOSIS — R06.83 SNORING: ICD-10-CM

## 2017-11-15 DIAGNOSIS — G47.10 HYPERSOMNIA, UNSPECIFIED: ICD-10-CM

## 2017-11-15 DIAGNOSIS — M25.561 BILATERAL KNEE PAIN: ICD-10-CM

## 2017-11-15 ASSESSMENT — MIFFLIN-ST. JEOR: SCORE: 1165.81

## 2017-11-16 ENCOUNTER — COMMUNICATION - HEALTHEAST (OUTPATIENT)
Dept: SLEEP MEDICINE | Facility: CLINIC | Age: 77
End: 2017-11-16

## 2017-11-17 ENCOUNTER — COMMUNICATION - HEALTHEAST (OUTPATIENT)
Dept: PALLIATIVE MEDICINE | Facility: OTHER | Age: 77
End: 2017-11-17

## 2017-11-20 ENCOUNTER — COMMUNICATION - HEALTHEAST (OUTPATIENT)
Dept: PALLIATIVE MEDICINE | Facility: OTHER | Age: 77
End: 2017-11-20

## 2017-11-21 ENCOUNTER — HOSPITAL ENCOUNTER (OUTPATIENT)
Dept: PALLIATIVE MEDICINE | Facility: OTHER | Age: 77
Discharge: HOME | End: 2017-11-21
Attending: PAIN MEDICINE

## 2017-11-21 ENCOUNTER — RECORDS - HEALTHEAST (OUTPATIENT)
Dept: ADMINISTRATIVE | Facility: OTHER | Age: 77
End: 2017-11-21

## 2017-11-21 DIAGNOSIS — G90.511 COMPLEX REGIONAL PAIN SYNDROME TYPE 1 OF RIGHT UPPER EXTREMITY: ICD-10-CM

## 2017-11-21 DIAGNOSIS — G90.50 REFLEX SYMPATHETIC DYSTROPHY: ICD-10-CM

## 2017-11-21 ASSESSMENT — MIFFLIN-ST. JEOR: SCORE: 1165.81

## 2017-11-22 ENCOUNTER — COMMUNICATION - HEALTHEAST (OUTPATIENT)
Dept: PALLIATIVE MEDICINE | Facility: CLINIC | Age: 77
End: 2017-11-22

## 2017-11-22 ENCOUNTER — COMMUNICATION - HEALTHEAST (OUTPATIENT)
Dept: PALLIATIVE MEDICINE | Facility: OTHER | Age: 77
End: 2017-11-22

## 2017-12-04 ENCOUNTER — COMMUNICATION - HEALTHEAST (OUTPATIENT)
Dept: PALLIATIVE MEDICINE | Facility: OTHER | Age: 77
End: 2017-12-04

## 2017-12-05 ENCOUNTER — HOSPITAL ENCOUNTER (OUTPATIENT)
Dept: PALLIATIVE MEDICINE | Facility: OTHER | Age: 77
Discharge: HOME | End: 2017-12-05
Attending: PAIN MEDICINE

## 2017-12-05 DIAGNOSIS — G90.50 REFLEX SYMPATHETIC DYSTROPHY: ICD-10-CM

## 2017-12-05 DIAGNOSIS — M79.2 SYMPATHETICALLY MAINTAINED PAIN: ICD-10-CM

## 2017-12-05 ASSESSMENT — MIFFLIN-ST. JEOR: SCORE: 1165.81

## 2017-12-06 ENCOUNTER — COMMUNICATION - HEALTHEAST (OUTPATIENT)
Dept: PALLIATIVE MEDICINE | Facility: OTHER | Age: 77
End: 2017-12-06

## 2017-12-07 ENCOUNTER — COMMUNICATION - HEALTHEAST (OUTPATIENT)
Dept: NURSING | Facility: CLINIC | Age: 77
End: 2017-12-07

## 2017-12-08 ENCOUNTER — OFFICE VISIT - HEALTHEAST (OUTPATIENT)
Dept: FAMILY MEDICINE | Facility: CLINIC | Age: 77
End: 2017-12-08

## 2017-12-08 DIAGNOSIS — H02.836: ICD-10-CM

## 2017-12-08 DIAGNOSIS — M85.80 OSTEOPENIA: ICD-10-CM

## 2017-12-08 DIAGNOSIS — G47.00 INSOMNIA: ICD-10-CM

## 2017-12-08 DIAGNOSIS — I10 HYPERTENSION: ICD-10-CM

## 2017-12-08 DIAGNOSIS — G89.4 CHRONIC PAIN SYNDROME: ICD-10-CM

## 2017-12-08 DIAGNOSIS — E78.2 MIXED HYPERLIPIDEMIA: ICD-10-CM

## 2017-12-08 DIAGNOSIS — L98.9 SKIN LESION: ICD-10-CM

## 2017-12-08 LAB
CHOLEST SERPL-MCNC: 221 MG/DL
FASTING STATUS PATIENT QL REPORTED: ABNORMAL
HDLC SERPL-MCNC: 82 MG/DL
LDLC SERPL CALC-MCNC: 126 MG/DL
TRIGL SERPL-MCNC: 65 MG/DL

## 2017-12-11 ENCOUNTER — COMMUNICATION - HEALTHEAST (OUTPATIENT)
Dept: PALLIATIVE MEDICINE | Facility: OTHER | Age: 77
End: 2017-12-11

## 2017-12-11 ENCOUNTER — COMMUNICATION - HEALTHEAST (OUTPATIENT)
Dept: FAMILY MEDICINE | Facility: CLINIC | Age: 77
End: 2017-12-11

## 2017-12-12 ENCOUNTER — HOSPITAL ENCOUNTER (OUTPATIENT)
Dept: PALLIATIVE MEDICINE | Facility: OTHER | Age: 77
Discharge: HOME | End: 2017-12-12
Attending: SOCIAL WORKER

## 2017-12-12 ENCOUNTER — HOSPITAL ENCOUNTER (OUTPATIENT)
Dept: PALLIATIVE MEDICINE | Facility: OTHER | Age: 77
Discharge: HOME | End: 2017-12-12
Attending: PAIN MEDICINE

## 2017-12-12 DIAGNOSIS — F43.12 CHRONIC POST-TRAUMATIC STRESS DISORDER (PTSD): ICD-10-CM

## 2017-12-12 DIAGNOSIS — F33.2 SEVERE RECURRENT MAJOR DEPRESSION WITHOUT PSYCHOTIC FEATURES (H): ICD-10-CM

## 2017-12-12 DIAGNOSIS — G89.4 CHRONIC PAIN SYNDROME: ICD-10-CM

## 2017-12-12 DIAGNOSIS — M79.2 SYMPATHETICALLY MAINTAINED PAIN: ICD-10-CM

## 2017-12-12 DIAGNOSIS — F41.1 GENERALIZED ANXIETY DISORDER: ICD-10-CM

## 2017-12-12 ASSESSMENT — MIFFLIN-ST. JEOR: SCORE: 1165.81

## 2017-12-13 ENCOUNTER — HOSPITAL ENCOUNTER (OUTPATIENT)
Dept: PALLIATIVE MEDICINE | Facility: OTHER | Age: 77
Discharge: HOME | End: 2017-12-13
Attending: ANESTHESIOLOGY

## 2017-12-13 ENCOUNTER — COMMUNICATION - HEALTHEAST (OUTPATIENT)
Dept: PALLIATIVE MEDICINE | Facility: OTHER | Age: 77
End: 2017-12-13

## 2017-12-13 ENCOUNTER — COMMUNICATION - HEALTHEAST (OUTPATIENT)
Dept: SLEEP MEDICINE | Facility: CLINIC | Age: 77
End: 2017-12-13

## 2017-12-13 DIAGNOSIS — R52 PAIN: ICD-10-CM

## 2017-12-13 ASSESSMENT — MIFFLIN-ST. JEOR: SCORE: 1165.81

## 2017-12-18 ENCOUNTER — COMMUNICATION - HEALTHEAST (OUTPATIENT)
Dept: NURSING | Facility: CLINIC | Age: 77
End: 2017-12-18

## 2017-12-19 ENCOUNTER — HOSPITAL ENCOUNTER (OUTPATIENT)
Dept: PALLIATIVE MEDICINE | Facility: OTHER | Age: 77
Discharge: HOME | End: 2017-12-19
Attending: PAIN MEDICINE

## 2017-12-19 DIAGNOSIS — M79.2 SYMPATHETICALLY MAINTAINED PAIN: ICD-10-CM

## 2017-12-19 ASSESSMENT — MIFFLIN-ST. JEOR: SCORE: 1165.81

## 2017-12-20 ENCOUNTER — COMMUNICATION - HEALTHEAST (OUTPATIENT)
Dept: PALLIATIVE MEDICINE | Facility: OTHER | Age: 77
End: 2017-12-20

## 2017-12-20 ENCOUNTER — HOSPITAL ENCOUNTER (OUTPATIENT)
Dept: PALLIATIVE MEDICINE | Facility: OTHER | Age: 77
Discharge: HOME | End: 2017-12-20
Attending: SOCIAL WORKER

## 2017-12-20 DIAGNOSIS — G89.4 CHRONIC PAIN SYNDROME: ICD-10-CM

## 2017-12-20 DIAGNOSIS — F33.2 SEVERE RECURRENT MAJOR DEPRESSION WITHOUT PSYCHOTIC FEATURES (H): ICD-10-CM

## 2017-12-20 DIAGNOSIS — F41.1 GENERALIZED ANXIETY DISORDER: ICD-10-CM

## 2017-12-20 DIAGNOSIS — F43.12 CHRONIC POST-TRAUMATIC STRESS DISORDER (PTSD): ICD-10-CM

## 2017-12-21 ENCOUNTER — RECORDS - HEALTHEAST (OUTPATIENT)
Dept: ADMINISTRATIVE | Facility: OTHER | Age: 77
End: 2017-12-21

## 2017-12-28 ENCOUNTER — HOSPITAL ENCOUNTER (OUTPATIENT)
Dept: PALLIATIVE MEDICINE | Facility: OTHER | Age: 77
Discharge: HOME | End: 2017-12-28
Attending: SOCIAL WORKER

## 2017-12-28 DIAGNOSIS — F33.2 SEVERE RECURRENT MAJOR DEPRESSION WITHOUT PSYCHOTIC FEATURES (H): ICD-10-CM

## 2017-12-28 DIAGNOSIS — G89.4 CHRONIC PAIN SYNDROME: ICD-10-CM

## 2017-12-28 DIAGNOSIS — F43.12 CHRONIC POST-TRAUMATIC STRESS DISORDER (PTSD): ICD-10-CM

## 2017-12-28 DIAGNOSIS — F41.1 GENERALIZED ANXIETY DISORDER: ICD-10-CM

## 2018-01-02 ENCOUNTER — RECORDS - HEALTHEAST (OUTPATIENT)
Dept: ADMINISTRATIVE | Facility: OTHER | Age: 78
End: 2018-01-02

## 2018-01-02 ENCOUNTER — OFFICE VISIT - HEALTHEAST (OUTPATIENT)
Dept: ENDOCRINOLOGY | Facility: CLINIC | Age: 78
End: 2018-01-02

## 2018-01-02 ENCOUNTER — COMMUNICATION - HEALTHEAST (OUTPATIENT)
Dept: PALLIATIVE MEDICINE | Facility: OTHER | Age: 78
End: 2018-01-02

## 2018-01-02 DIAGNOSIS — E03.9 ACQUIRED HYPOTHYROIDISM: ICD-10-CM

## 2018-01-02 ASSESSMENT — MIFFLIN-ST. JEOR: SCORE: 1174.2

## 2018-01-03 ENCOUNTER — COMMUNICATION - HEALTHEAST (OUTPATIENT)
Dept: SCHEDULING | Facility: CLINIC | Age: 78
End: 2018-01-03

## 2018-01-03 ENCOUNTER — RECORDS - HEALTHEAST (OUTPATIENT)
Dept: ADMINISTRATIVE | Facility: OTHER | Age: 78
End: 2018-01-03

## 2018-01-03 ENCOUNTER — HOSPITAL ENCOUNTER (OUTPATIENT)
Dept: PALLIATIVE MEDICINE | Facility: OTHER | Age: 78
Discharge: HOME | End: 2018-01-03
Attending: PAIN MEDICINE

## 2018-01-03 DIAGNOSIS — M79.2 SYMPATHETICALLY MAINTAINED PAIN: ICD-10-CM

## 2018-01-03 ASSESSMENT — MIFFLIN-ST. JEOR: SCORE: 1171.48

## 2018-01-04 ENCOUNTER — RECORDS - HEALTHEAST (OUTPATIENT)
Dept: ADMINISTRATIVE | Facility: OTHER | Age: 78
End: 2018-01-04

## 2018-01-04 ENCOUNTER — COMMUNICATION - HEALTHEAST (OUTPATIENT)
Dept: PALLIATIVE MEDICINE | Facility: OTHER | Age: 78
End: 2018-01-04

## 2018-01-05 ENCOUNTER — RECORDS - HEALTHEAST (OUTPATIENT)
Dept: ADMINISTRATIVE | Facility: OTHER | Age: 78
End: 2018-01-05

## 2018-01-05 ENCOUNTER — COMMUNICATION - HEALTHEAST (OUTPATIENT)
Dept: SCHEDULING | Facility: CLINIC | Age: 78
End: 2018-01-05

## 2018-01-05 ENCOUNTER — OFFICE VISIT - HEALTHEAST (OUTPATIENT)
Dept: FAMILY MEDICINE | Facility: CLINIC | Age: 78
End: 2018-01-05

## 2018-01-05 ENCOUNTER — HOSPITAL ENCOUNTER (OUTPATIENT)
Dept: PALLIATIVE MEDICINE | Facility: OTHER | Age: 78
Discharge: HOME | End: 2018-01-05
Attending: SOCIAL WORKER

## 2018-01-05 ENCOUNTER — AMBULATORY - HEALTHEAST (OUTPATIENT)
Dept: PALLIATIVE MEDICINE | Facility: OTHER | Age: 78
End: 2018-01-05

## 2018-01-05 DIAGNOSIS — F43.12 CHRONIC POST-TRAUMATIC STRESS DISORDER (PTSD): ICD-10-CM

## 2018-01-05 DIAGNOSIS — M25.559 CHRONIC HIP PAIN: ICD-10-CM

## 2018-01-05 DIAGNOSIS — F33.2 SEVERE RECURRENT MAJOR DEPRESSION WITHOUT PSYCHOTIC FEATURES (H): ICD-10-CM

## 2018-01-05 DIAGNOSIS — F41.1 GENERALIZED ANXIETY DISORDER: ICD-10-CM

## 2018-01-05 DIAGNOSIS — G89.29 CHRONIC HIP PAIN: ICD-10-CM

## 2018-01-05 DIAGNOSIS — M79.604 RIGHT LEG PAIN: ICD-10-CM

## 2018-01-05 DIAGNOSIS — G89.4 CHRONIC PAIN SYNDROME: ICD-10-CM

## 2018-01-08 ENCOUNTER — AMBULATORY - HEALTHEAST (OUTPATIENT)
Dept: PALLIATIVE MEDICINE | Facility: OTHER | Age: 78
End: 2018-01-08

## 2018-01-09 ENCOUNTER — COMMUNICATION - HEALTHEAST (OUTPATIENT)
Dept: PALLIATIVE MEDICINE | Facility: OTHER | Age: 78
End: 2018-01-09

## 2018-01-10 ENCOUNTER — HOSPITAL ENCOUNTER (OUTPATIENT)
Dept: PALLIATIVE MEDICINE | Facility: OTHER | Age: 78
Discharge: HOME | End: 2018-01-10
Attending: PAIN MEDICINE

## 2018-01-10 ENCOUNTER — HOSPITAL ENCOUNTER (OUTPATIENT)
Dept: PALLIATIVE MEDICINE | Facility: OTHER | Age: 78
Discharge: HOME | End: 2018-01-10
Attending: SOCIAL WORKER

## 2018-01-10 DIAGNOSIS — F33.2 SEVERE RECURRENT MAJOR DEPRESSION WITHOUT PSYCHOTIC FEATURES (H): ICD-10-CM

## 2018-01-10 DIAGNOSIS — F43.12 CHRONIC POST-TRAUMATIC STRESS DISORDER (PTSD): ICD-10-CM

## 2018-01-10 DIAGNOSIS — F41.1 GENERALIZED ANXIETY DISORDER: ICD-10-CM

## 2018-01-10 DIAGNOSIS — G89.4 CHRONIC PAIN SYNDROME: ICD-10-CM

## 2018-01-10 DIAGNOSIS — M79.2 SYMPATHETICALLY MAINTAINED PAIN: ICD-10-CM

## 2018-01-10 ASSESSMENT — MIFFLIN-ST. JEOR: SCORE: 1180.55

## 2018-01-11 ENCOUNTER — COMMUNICATION - HEALTHEAST (OUTPATIENT)
Dept: PALLIATIVE MEDICINE | Facility: OTHER | Age: 78
End: 2018-01-11

## 2018-01-12 ENCOUNTER — HOSPITAL ENCOUNTER (OUTPATIENT)
Dept: PALLIATIVE MEDICINE | Facility: OTHER | Age: 78
Discharge: HOME | End: 2018-01-12
Attending: ANESTHESIOLOGY

## 2018-01-12 DIAGNOSIS — G89.4 CHRONIC PAIN SYNDROME: ICD-10-CM

## 2018-01-12 DIAGNOSIS — R52 PAIN: ICD-10-CM

## 2018-01-12 ASSESSMENT — MIFFLIN-ST. JEOR: SCORE: 1180.55

## 2018-01-16 ENCOUNTER — COMMUNICATION - HEALTHEAST (OUTPATIENT)
Dept: PALLIATIVE MEDICINE | Facility: OTHER | Age: 78
End: 2018-01-16

## 2018-01-17 ENCOUNTER — HOSPITAL ENCOUNTER (OUTPATIENT)
Dept: PALLIATIVE MEDICINE | Facility: OTHER | Age: 78
Discharge: HOME | End: 2018-01-17
Attending: SOCIAL WORKER | Admitting: PAIN MEDICINE

## 2018-01-17 ENCOUNTER — HOSPITAL ENCOUNTER (OUTPATIENT)
Dept: PALLIATIVE MEDICINE | Facility: OTHER | Age: 78
Discharge: HOME | End: 2018-01-17
Attending: PAIN MEDICINE

## 2018-01-17 DIAGNOSIS — M79.18 MYOFASCIAL PAIN: ICD-10-CM

## 2018-01-17 DIAGNOSIS — F41.1 GENERALIZED ANXIETY DISORDER: ICD-10-CM

## 2018-01-17 DIAGNOSIS — F33.2 SEVERE RECURRENT MAJOR DEPRESSION WITHOUT PSYCHOTIC FEATURES (H): ICD-10-CM

## 2018-01-17 DIAGNOSIS — F43.12 CHRONIC POST-TRAUMATIC STRESS DISORDER (PTSD): ICD-10-CM

## 2018-01-17 DIAGNOSIS — G89.4 CHRONIC PAIN SYNDROME: ICD-10-CM

## 2018-01-17 DIAGNOSIS — M17.0 OSTEOARTHRITIS OF KNEES, BILATERAL: ICD-10-CM

## 2018-01-17 ASSESSMENT — MIFFLIN-ST. JEOR: SCORE: 1180.55

## 2018-01-19 ENCOUNTER — COMMUNICATION - HEALTHEAST (OUTPATIENT)
Dept: PALLIATIVE MEDICINE | Facility: OTHER | Age: 78
End: 2018-01-19

## 2018-01-21 ENCOUNTER — COMMUNICATION - HEALTHEAST (OUTPATIENT)
Dept: FAMILY MEDICINE | Facility: CLINIC | Age: 78
End: 2018-01-21

## 2018-01-21 DIAGNOSIS — G47.00 INSOMNIA: ICD-10-CM

## 2018-01-22 ENCOUNTER — COMMUNICATION - HEALTHEAST (OUTPATIENT)
Dept: FAMILY MEDICINE | Facility: CLINIC | Age: 78
End: 2018-01-22

## 2018-01-22 ENCOUNTER — COMMUNICATION - HEALTHEAST (OUTPATIENT)
Dept: SCHEDULING | Facility: CLINIC | Age: 78
End: 2018-01-22

## 2018-01-22 ENCOUNTER — HOSPITAL ENCOUNTER (OUTPATIENT)
Dept: PALLIATIVE MEDICINE | Facility: OTHER | Age: 78
Discharge: HOME | End: 2018-01-22
Attending: SOCIAL WORKER

## 2018-01-22 ENCOUNTER — COMMUNICATION - HEALTHEAST (OUTPATIENT)
Dept: PALLIATIVE MEDICINE | Facility: OTHER | Age: 78
End: 2018-01-22

## 2018-01-22 DIAGNOSIS — F41.1 GENERALIZED ANXIETY DISORDER: ICD-10-CM

## 2018-01-22 DIAGNOSIS — F33.2 SEVERE RECURRENT MAJOR DEPRESSION WITHOUT PSYCHOTIC FEATURES (H): ICD-10-CM

## 2018-01-22 DIAGNOSIS — F43.12 CHRONIC POST-TRAUMATIC STRESS DISORDER (PTSD): ICD-10-CM

## 2018-01-22 DIAGNOSIS — G89.4 CHRONIC PAIN SYNDROME: ICD-10-CM

## 2018-01-23 ENCOUNTER — HOSPITAL ENCOUNTER (OUTPATIENT)
Dept: PALLIATIVE MEDICINE | Facility: OTHER | Age: 78
Discharge: HOME | End: 2018-01-23
Attending: PAIN MEDICINE | Admitting: PAIN MEDICINE

## 2018-01-23 DIAGNOSIS — M17.0 OSTEOARTHRITIS OF KNEES, BILATERAL: ICD-10-CM

## 2018-01-23 ASSESSMENT — MIFFLIN-ST. JEOR: SCORE: 1171.48

## 2018-01-24 ENCOUNTER — COMMUNICATION - HEALTHEAST (OUTPATIENT)
Dept: ADMINISTRATIVE | Facility: CLINIC | Age: 78
End: 2018-01-24

## 2018-01-24 ENCOUNTER — COMMUNICATION - HEALTHEAST (OUTPATIENT)
Dept: PALLIATIVE MEDICINE | Facility: OTHER | Age: 78
End: 2018-01-24

## 2018-01-26 ENCOUNTER — OFFICE VISIT - HEALTHEAST (OUTPATIENT)
Dept: FAMILY MEDICINE | Facility: CLINIC | Age: 78
End: 2018-01-26

## 2018-01-26 DIAGNOSIS — I10 ESSENTIAL HYPERTENSION: ICD-10-CM

## 2018-01-26 DIAGNOSIS — E78.2 MIXED HYPERLIPIDEMIA: ICD-10-CM

## 2018-01-26 DIAGNOSIS — N18.31 CHRONIC KIDNEY DISEASE (CKD) STAGE G3A/A1, MODERATELY DECREASED GLOMERULAR FILTRATION RATE (GFR) BETWEEN 45-59 ML/MIN/1.73 SQUARE METER AND ALBUMINURIA CREATININE RATIO LESS THAN 30 MG/G (H): ICD-10-CM

## 2018-01-26 DIAGNOSIS — R51.9 HEAD PAIN CEPHALGIA: ICD-10-CM

## 2018-01-26 DIAGNOSIS — R73.9 HYPERGLYCEMIA: ICD-10-CM

## 2018-01-26 DIAGNOSIS — G44.009 CLUSTER HEADACHES: ICD-10-CM

## 2018-01-26 LAB
ANION GAP SERPL CALCULATED.3IONS-SCNC: 13 MMOL/L (ref 5–18)
BUN SERPL-MCNC: 22 MG/DL (ref 8–28)
CALCIUM SERPL-MCNC: 9.2 MG/DL (ref 8.5–10.5)
CHLORIDE BLD-SCNC: 103 MMOL/L (ref 98–107)
CO2 SERPL-SCNC: 21 MMOL/L (ref 22–31)
CREAT SERPL-MCNC: 0.97 MG/DL (ref 0.6–1.1)
ERYTHROCYTE [DISTWIDTH] IN BLOOD BY AUTOMATED COUNT: 13.1 % (ref 11–14.5)
GFR SERPL CREATININE-BSD FRML MDRD: 56 ML/MIN/1.73M2
GLUCOSE BLD-MCNC: 94 MG/DL (ref 70–125)
HBA1C MFR BLD: 5.2 % (ref 3.5–6)
HCT VFR BLD AUTO: 41.6 % (ref 35–47)
HGB BLD-MCNC: 13.9 G/DL (ref 12–16)
MCH RBC QN AUTO: 31 PG (ref 27–34)
MCHC RBC AUTO-ENTMCNC: 33.3 G/DL (ref 32–36)
MCV RBC AUTO: 93 FL (ref 80–100)
PLATELET # BLD AUTO: 217 THOU/UL (ref 140–440)
PMV BLD AUTO: 7.3 FL (ref 7–10)
POTASSIUM BLD-SCNC: 4.3 MMOL/L (ref 3.5–5)
RBC # BLD AUTO: 4.46 MILL/UL (ref 3.8–5.4)
SODIUM SERPL-SCNC: 137 MMOL/L (ref 136–145)
WBC: 6.3 THOU/UL (ref 4–11)

## 2018-01-26 ASSESSMENT — MIFFLIN-ST. JEOR: SCORE: 1153.34

## 2018-01-29 ENCOUNTER — COMMUNICATION - HEALTHEAST (OUTPATIENT)
Dept: FAMILY MEDICINE | Facility: CLINIC | Age: 78
End: 2018-01-29

## 2018-02-01 ENCOUNTER — COMMUNICATION - HEALTHEAST (OUTPATIENT)
Dept: NURSING | Facility: CLINIC | Age: 78
End: 2018-02-01

## 2018-02-02 ENCOUNTER — COMMUNICATION - HEALTHEAST (OUTPATIENT)
Dept: PALLIATIVE MEDICINE | Facility: OTHER | Age: 78
End: 2018-02-02

## 2018-02-05 ENCOUNTER — HOSPITAL ENCOUNTER (OUTPATIENT)
Dept: PALLIATIVE MEDICINE | Facility: OTHER | Age: 78
Discharge: HOME | End: 2018-02-05
Attending: PAIN MEDICINE | Admitting: PAIN MEDICINE

## 2018-02-05 DIAGNOSIS — M17.0 OSTEOARTHRITIS OF KNEES, BILATERAL: ICD-10-CM

## 2018-02-05 DIAGNOSIS — M79.2 SYMPATHETICALLY MAINTAINED PAIN: ICD-10-CM

## 2018-02-05 ASSESSMENT — MIFFLIN-ST. JEOR: SCORE: 1153.34

## 2018-02-06 ENCOUNTER — COMMUNICATION - HEALTHEAST (OUTPATIENT)
Dept: PALLIATIVE MEDICINE | Facility: OTHER | Age: 78
End: 2018-02-06

## 2018-02-09 ENCOUNTER — AMBULATORY - HEALTHEAST (OUTPATIENT)
Dept: ENDOCRINOLOGY | Facility: CLINIC | Age: 78
End: 2018-02-09

## 2018-02-09 DIAGNOSIS — M85.80 OSTEOPENIA: ICD-10-CM

## 2018-02-12 ENCOUNTER — COMMUNICATION - HEALTHEAST (OUTPATIENT)
Dept: PALLIATIVE MEDICINE | Facility: OTHER | Age: 78
End: 2018-02-12

## 2018-02-13 ENCOUNTER — HOSPITAL ENCOUNTER (OUTPATIENT)
Dept: PALLIATIVE MEDICINE | Facility: OTHER | Age: 78
Discharge: HOME | End: 2018-02-13
Attending: PAIN MEDICINE | Admitting: PAIN MEDICINE

## 2018-02-13 DIAGNOSIS — M17.0 OSTEOARTHRITIS OF KNEES, BILATERAL: ICD-10-CM

## 2018-02-13 ASSESSMENT — MIFFLIN-ST. JEOR: SCORE: 1122.72

## 2018-02-14 ENCOUNTER — COMMUNICATION - HEALTHEAST (OUTPATIENT)
Dept: PALLIATIVE MEDICINE | Facility: OTHER | Age: 78
End: 2018-02-14

## 2018-02-14 ENCOUNTER — HOSPITAL ENCOUNTER (OUTPATIENT)
Dept: PALLIATIVE MEDICINE | Facility: OTHER | Age: 78
Discharge: HOME | End: 2018-02-14
Attending: ANESTHESIOLOGY

## 2018-02-14 ENCOUNTER — HOSPITAL ENCOUNTER (OUTPATIENT)
Dept: PALLIATIVE MEDICINE | Facility: OTHER | Age: 78
Discharge: HOME | End: 2018-02-14
Attending: SOCIAL WORKER

## 2018-02-14 DIAGNOSIS — R52 PAIN: ICD-10-CM

## 2018-02-14 DIAGNOSIS — G89.4 CHRONIC PAIN SYNDROME: ICD-10-CM

## 2018-02-14 DIAGNOSIS — F33.2 SEVERE RECURRENT MAJOR DEPRESSION WITHOUT PSYCHOTIC FEATURES (H): ICD-10-CM

## 2018-02-14 DIAGNOSIS — F41.1 GENERALIZED ANXIETY DISORDER: ICD-10-CM

## 2018-02-14 ASSESSMENT — MIFFLIN-ST. JEOR: SCORE: 1122.72

## 2018-02-15 ENCOUNTER — COMMUNICATION - HEALTHEAST (OUTPATIENT)
Dept: PALLIATIVE MEDICINE | Facility: CLINIC | Age: 78
End: 2018-02-15

## 2018-02-15 DIAGNOSIS — R52 PAIN: ICD-10-CM

## 2018-02-20 ENCOUNTER — COMMUNICATION - HEALTHEAST (OUTPATIENT)
Dept: PALLIATIVE MEDICINE | Facility: OTHER | Age: 78
End: 2018-02-20

## 2018-02-21 ENCOUNTER — HOSPITAL ENCOUNTER (OUTPATIENT)
Dept: PALLIATIVE MEDICINE | Facility: OTHER | Age: 78
Discharge: HOME | End: 2018-02-21
Attending: PAIN MEDICINE

## 2018-02-21 ENCOUNTER — HOSPITAL ENCOUNTER (OUTPATIENT)
Dept: PALLIATIVE MEDICINE | Facility: OTHER | Age: 78
Discharge: HOME | End: 2018-02-21
Attending: SOCIAL WORKER | Admitting: PAIN MEDICINE

## 2018-02-21 DIAGNOSIS — M17.0 OSTEOARTHRITIS OF KNEES, BILATERAL: ICD-10-CM

## 2018-02-21 DIAGNOSIS — F41.1 GENERALIZED ANXIETY DISORDER: ICD-10-CM

## 2018-02-21 DIAGNOSIS — F33.2 SEVERE RECURRENT MAJOR DEPRESSION WITHOUT PSYCHOTIC FEATURES (H): ICD-10-CM

## 2018-02-21 DIAGNOSIS — G89.4 CHRONIC PAIN SYNDROME: ICD-10-CM

## 2018-02-21 ASSESSMENT — MIFFLIN-ST. JEOR: SCORE: 1122.72

## 2018-02-22 ENCOUNTER — COMMUNICATION - HEALTHEAST (OUTPATIENT)
Dept: PALLIATIVE MEDICINE | Facility: OTHER | Age: 78
End: 2018-02-22

## 2018-02-26 ENCOUNTER — OFFICE VISIT - HEALTHEAST (OUTPATIENT)
Dept: FAMILY MEDICINE | Facility: CLINIC | Age: 78
End: 2018-02-26

## 2018-02-26 DIAGNOSIS — M17.11 ARTHRITIS OF KNEE, RIGHT: ICD-10-CM

## 2018-02-26 DIAGNOSIS — G47.00 INSOMNIA: ICD-10-CM

## 2018-02-26 DIAGNOSIS — I10 ESSENTIAL HYPERTENSION: ICD-10-CM

## 2018-02-26 DIAGNOSIS — E78.2 MIXED HYPERLIPIDEMIA: ICD-10-CM

## 2018-02-26 LAB
ALBUMIN SERPL-MCNC: 3.9 G/DL (ref 3.5–5)
ALP SERPL-CCNC: 63 U/L (ref 45–120)
ALT SERPL W P-5'-P-CCNC: 21 U/L (ref 0–45)
ANION GAP SERPL CALCULATED.3IONS-SCNC: 11 MMOL/L (ref 5–18)
AST SERPL W P-5'-P-CCNC: 25 U/L (ref 0–40)
BILIRUB SERPL-MCNC: 0.9 MG/DL (ref 0–1)
BUN SERPL-MCNC: 12 MG/DL (ref 8–28)
CALCIUM SERPL-MCNC: 8.7 MG/DL (ref 8.5–10.5)
CHLORIDE BLD-SCNC: 104 MMOL/L (ref 98–107)
CHOLEST SERPL-MCNC: 224 MG/DL
CO2 SERPL-SCNC: 22 MMOL/L (ref 22–31)
CREAT SERPL-MCNC: 0.75 MG/DL (ref 0.6–1.1)
FASTING STATUS PATIENT QL REPORTED: ABNORMAL
GFR SERPL CREATININE-BSD FRML MDRD: >60 ML/MIN/1.73M2
GLUCOSE BLD-MCNC: 90 MG/DL (ref 70–125)
HDLC SERPL-MCNC: 71 MG/DL
LDLC SERPL CALC-MCNC: 128 MG/DL
POTASSIUM BLD-SCNC: 4.1 MMOL/L (ref 3.5–5)
PROT SERPL-MCNC: 6.9 G/DL (ref 6–8)
SODIUM SERPL-SCNC: 137 MMOL/L (ref 136–145)
TRIGL SERPL-MCNC: 126 MG/DL

## 2018-02-27 ENCOUNTER — COMMUNICATION - HEALTHEAST (OUTPATIENT)
Dept: PALLIATIVE MEDICINE | Facility: OTHER | Age: 78
End: 2018-02-27

## 2018-02-28 ENCOUNTER — COMMUNICATION - HEALTHEAST (OUTPATIENT)
Dept: FAMILY MEDICINE | Facility: CLINIC | Age: 78
End: 2018-02-28

## 2018-02-28 ENCOUNTER — HOSPITAL ENCOUNTER (OUTPATIENT)
Dept: PALLIATIVE MEDICINE | Facility: OTHER | Age: 78
Discharge: HOME | End: 2018-02-28
Attending: PAIN MEDICINE | Admitting: PAIN MEDICINE

## 2018-02-28 ENCOUNTER — HOSPITAL ENCOUNTER (OUTPATIENT)
Dept: PALLIATIVE MEDICINE | Facility: OTHER | Age: 78
Discharge: HOME | End: 2018-02-28
Attending: PAIN MEDICINE

## 2018-02-28 DIAGNOSIS — F41.1 GENERALIZED ANXIETY DISORDER: ICD-10-CM

## 2018-02-28 DIAGNOSIS — F33.2 SEVERE RECURRENT MAJOR DEPRESSION WITHOUT PSYCHOTIC FEATURES (H): ICD-10-CM

## 2018-02-28 DIAGNOSIS — G89.4 CHRONIC PAIN SYNDROME: ICD-10-CM

## 2018-02-28 DIAGNOSIS — M79.2 SYMPATHETICALLY MAINTAINED PAIN: ICD-10-CM

## 2018-02-28 DIAGNOSIS — M17.0 OSTEOARTHRITIS OF KNEES, BILATERAL: ICD-10-CM

## 2018-02-28 ASSESSMENT — MIFFLIN-ST. JEOR: SCORE: 1131.79

## 2018-03-01 ENCOUNTER — COMMUNICATION - HEALTHEAST (OUTPATIENT)
Dept: PALLIATIVE MEDICINE | Facility: OTHER | Age: 78
End: 2018-03-01

## 2018-03-08 ENCOUNTER — COMMUNICATION - HEALTHEAST (OUTPATIENT)
Dept: NURSING | Facility: CLINIC | Age: 78
End: 2018-03-08

## 2018-03-12 ENCOUNTER — HOSPITAL ENCOUNTER (OUTPATIENT)
Dept: PALLIATIVE MEDICINE | Facility: OTHER | Age: 78
Discharge: HOME | End: 2018-03-12
Attending: ANESTHESIOLOGY

## 2018-03-12 ENCOUNTER — COMMUNICATION - HEALTHEAST (OUTPATIENT)
Dept: PALLIATIVE MEDICINE | Facility: OTHER | Age: 78
End: 2018-03-12

## 2018-03-12 DIAGNOSIS — G89.4 CHRONIC PAIN SYNDROME: ICD-10-CM

## 2018-03-12 DIAGNOSIS — R52 PAIN: ICD-10-CM

## 2018-03-12 DIAGNOSIS — G90.523 COMPLEX REGIONAL PAIN SYNDROME TYPE 1 OF BOTH LOWER EXTREMITIES: ICD-10-CM

## 2018-03-12 ASSESSMENT — MIFFLIN-ST. JEOR: SCORE: 1131.79

## 2018-03-14 ENCOUNTER — HOSPITAL ENCOUNTER (OUTPATIENT)
Dept: PALLIATIVE MEDICINE | Facility: OTHER | Age: 78
Discharge: HOME | End: 2018-03-14
Attending: SOCIAL WORKER | Admitting: PAIN MEDICINE

## 2018-03-14 ENCOUNTER — OFFICE VISIT - HEALTHEAST (OUTPATIENT)
Dept: SLEEP MEDICINE | Facility: CLINIC | Age: 78
End: 2018-03-14

## 2018-03-14 ENCOUNTER — HOSPITAL ENCOUNTER (OUTPATIENT)
Dept: PALLIATIVE MEDICINE | Facility: OTHER | Age: 78
Discharge: HOME | End: 2018-03-14
Attending: PAIN MEDICINE

## 2018-03-14 DIAGNOSIS — F33.2 SEVERE RECURRENT MAJOR DEPRESSION WITHOUT PSYCHOTIC FEATURES (H): ICD-10-CM

## 2018-03-14 DIAGNOSIS — G89.4 CHRONIC PAIN SYNDROME: ICD-10-CM

## 2018-03-14 DIAGNOSIS — M79.2 SYMPATHETICALLY MAINTAINED PAIN: ICD-10-CM

## 2018-03-14 DIAGNOSIS — R06.83 SNORING: ICD-10-CM

## 2018-03-14 DIAGNOSIS — G47.8 SLEEP DYSFUNCTION WITH SLEEP STAGE DISTURBANCE: ICD-10-CM

## 2018-03-14 DIAGNOSIS — G47.69 SLEEP-RELATED MOVEMENT DISORDER: ICD-10-CM

## 2018-03-14 DIAGNOSIS — F41.1 GENERALIZED ANXIETY DISORDER: ICD-10-CM

## 2018-03-14 ASSESSMENT — MIFFLIN-ST. JEOR: SCORE: 1138.14

## 2018-03-15 ENCOUNTER — RECORDS - HEALTHEAST (OUTPATIENT)
Dept: ADMINISTRATIVE | Facility: OTHER | Age: 78
End: 2018-03-15

## 2018-03-15 ENCOUNTER — COMMUNICATION - HEALTHEAST (OUTPATIENT)
Dept: PALLIATIVE MEDICINE | Facility: OTHER | Age: 78
End: 2018-03-15

## 2018-03-26 ENCOUNTER — OFFICE VISIT - HEALTHEAST (OUTPATIENT)
Dept: FAMILY MEDICINE | Facility: CLINIC | Age: 78
End: 2018-03-26

## 2018-03-26 DIAGNOSIS — G47.00 INSOMNIA: ICD-10-CM

## 2018-03-26 DIAGNOSIS — M25.561 RIGHT KNEE PAIN: ICD-10-CM

## 2018-03-26 DIAGNOSIS — I10 ESSENTIAL HYPERTENSION: ICD-10-CM

## 2018-03-26 DIAGNOSIS — E78.5 HYPERLIPIDEMIA: ICD-10-CM

## 2018-03-26 DIAGNOSIS — M54.16 LUMBAR RADICULOPATHY: ICD-10-CM

## 2018-03-26 DIAGNOSIS — L60.0 INGROWN TOENAIL WITHOUT INFECTION: ICD-10-CM

## 2018-03-27 ENCOUNTER — COMMUNICATION - HEALTHEAST (OUTPATIENT)
Dept: PALLIATIVE MEDICINE | Facility: OTHER | Age: 78
End: 2018-03-27

## 2018-03-28 ENCOUNTER — HOSPITAL ENCOUNTER (OUTPATIENT)
Dept: PALLIATIVE MEDICINE | Facility: OTHER | Age: 78
Discharge: HOME | End: 2018-03-28
Attending: SOCIAL WORKER | Admitting: PAIN MEDICINE

## 2018-03-28 ENCOUNTER — HOSPITAL ENCOUNTER (OUTPATIENT)
Dept: PALLIATIVE MEDICINE | Facility: OTHER | Age: 78
Discharge: HOME | End: 2018-03-28
Attending: PAIN MEDICINE

## 2018-03-28 DIAGNOSIS — M79.2 SYMPATHETICALLY MAINTAINED PAIN: ICD-10-CM

## 2018-03-28 DIAGNOSIS — F33.2 SEVERE RECURRENT MAJOR DEPRESSION WITHOUT PSYCHOTIC FEATURES (H): ICD-10-CM

## 2018-03-28 DIAGNOSIS — G89.4 CHRONIC PAIN SYNDROME: ICD-10-CM

## 2018-03-28 DIAGNOSIS — F41.1 GENERALIZED ANXIETY DISORDER: ICD-10-CM

## 2018-03-28 ASSESSMENT — MIFFLIN-ST. JEOR: SCORE: 1149.93

## 2018-03-29 ENCOUNTER — COMMUNICATION - HEALTHEAST (OUTPATIENT)
Dept: PALLIATIVE MEDICINE | Facility: OTHER | Age: 78
End: 2018-03-29

## 2018-04-10 ENCOUNTER — AMBULATORY - HEALTHEAST (OUTPATIENT)
Dept: PHYSICAL MEDICINE AND REHAB | Facility: CLINIC | Age: 78
End: 2018-04-10

## 2018-04-11 ENCOUNTER — COMMUNICATION - HEALTHEAST (OUTPATIENT)
Dept: PHYSICAL MEDICINE AND REHAB | Facility: CLINIC | Age: 78
End: 2018-04-11

## 2018-04-11 ENCOUNTER — COMMUNICATION - HEALTHEAST (OUTPATIENT)
Dept: FAMILY MEDICINE | Facility: CLINIC | Age: 78
End: 2018-04-11

## 2018-04-11 DIAGNOSIS — I10 ESSENTIAL HYPERTENSION: ICD-10-CM

## 2018-04-11 DIAGNOSIS — G47.00 INSOMNIA: ICD-10-CM

## 2018-04-12 ENCOUNTER — RECORDS - HEALTHEAST (OUTPATIENT)
Dept: ADMINISTRATIVE | Facility: OTHER | Age: 78
End: 2018-04-12

## 2018-04-16 ENCOUNTER — HOSPITAL ENCOUNTER (OUTPATIENT)
Dept: PALLIATIVE MEDICINE | Facility: OTHER | Age: 78
Discharge: HOME | End: 2018-04-16
Attending: ANESTHESIOLOGY

## 2018-04-16 DIAGNOSIS — F41.9 ANXIETY AND DEPRESSION: ICD-10-CM

## 2018-04-16 DIAGNOSIS — R52 PAIN: ICD-10-CM

## 2018-04-16 DIAGNOSIS — G90.523 COMPLEX REGIONAL PAIN SYNDROME TYPE 1 OF BOTH LOWER EXTREMITIES: ICD-10-CM

## 2018-04-16 DIAGNOSIS — G47.00 INSOMNIA: ICD-10-CM

## 2018-04-16 DIAGNOSIS — F32.A ANXIETY AND DEPRESSION: ICD-10-CM

## 2018-04-16 ASSESSMENT — MIFFLIN-ST. JEOR: SCORE: 1149.93

## 2018-04-17 ENCOUNTER — COMMUNICATION - HEALTHEAST (OUTPATIENT)
Dept: PALLIATIVE MEDICINE | Facility: OTHER | Age: 78
End: 2018-04-17

## 2018-04-18 ENCOUNTER — RECORDS - HEALTHEAST (OUTPATIENT)
Dept: ADMINISTRATIVE | Facility: OTHER | Age: 78
End: 2018-04-18

## 2018-04-20 ENCOUNTER — COMMUNICATION - HEALTHEAST (OUTPATIENT)
Dept: NURSING | Facility: CLINIC | Age: 78
End: 2018-04-20

## 2018-04-24 ENCOUNTER — COMMUNICATION - HEALTHEAST (OUTPATIENT)
Dept: FAMILY MEDICINE | Facility: CLINIC | Age: 78
End: 2018-04-24

## 2018-04-24 ENCOUNTER — OFFICE VISIT - HEALTHEAST (OUTPATIENT)
Dept: FAMILY MEDICINE | Facility: CLINIC | Age: 78
End: 2018-04-24

## 2018-04-24 DIAGNOSIS — G89.4 CHRONIC PAIN SYNDROME: ICD-10-CM

## 2018-04-24 DIAGNOSIS — S83.206A RIGHT KNEE MENISCAL TEAR: ICD-10-CM

## 2018-04-24 DIAGNOSIS — I10 ESSENTIAL HYPERTENSION: ICD-10-CM

## 2018-04-24 DIAGNOSIS — E78.2 MIXED HYPERLIPIDEMIA: ICD-10-CM

## 2018-04-24 LAB
ALBUMIN SERPL-MCNC: 3.7 G/DL (ref 3.5–5)
ALP SERPL-CCNC: 62 U/L (ref 45–120)
ALT SERPL W P-5'-P-CCNC: 15 U/L (ref 0–45)
ANION GAP SERPL CALCULATED.3IONS-SCNC: 11 MMOL/L (ref 5–18)
AST SERPL W P-5'-P-CCNC: 21 U/L (ref 0–40)
BILIRUB SERPL-MCNC: 0.8 MG/DL (ref 0–1)
BUN SERPL-MCNC: 17 MG/DL (ref 8–28)
CALCIUM SERPL-MCNC: 8.9 MG/DL (ref 8.5–10.5)
CHLORIDE BLD-SCNC: 105 MMOL/L (ref 98–107)
CHOLEST SERPL-MCNC: 232 MG/DL
CO2 SERPL-SCNC: 23 MMOL/L (ref 22–31)
CREAT SERPL-MCNC: 0.83 MG/DL (ref 0.6–1.1)
FASTING STATUS PATIENT QL REPORTED: NO
GFR SERPL CREATININE-BSD FRML MDRD: >60 ML/MIN/1.73M2
GLUCOSE BLD-MCNC: 124 MG/DL (ref 70–125)
HDLC SERPL-MCNC: 89 MG/DL
LDLC SERPL CALC-MCNC: 132 MG/DL
POTASSIUM BLD-SCNC: 4.1 MMOL/L (ref 3.5–5)
PROT SERPL-MCNC: 6.6 G/DL (ref 6–8)
SODIUM SERPL-SCNC: 139 MMOL/L (ref 136–145)
TRIGL SERPL-MCNC: 54 MG/DL

## 2018-04-25 ENCOUNTER — COMMUNICATION - HEALTHEAST (OUTPATIENT)
Dept: FAMILY MEDICINE | Facility: CLINIC | Age: 78
End: 2018-04-25

## 2018-05-04 ENCOUNTER — COMMUNICATION - HEALTHEAST (OUTPATIENT)
Dept: NURSING | Facility: CLINIC | Age: 78
End: 2018-05-04

## 2018-05-04 ENCOUNTER — OFFICE VISIT - HEALTHEAST (OUTPATIENT)
Dept: FAMILY MEDICINE | Facility: CLINIC | Age: 78
End: 2018-05-04

## 2018-05-04 ENCOUNTER — COMMUNICATION - HEALTHEAST (OUTPATIENT)
Dept: SCHEDULING | Facility: CLINIC | Age: 78
End: 2018-05-04

## 2018-05-04 DIAGNOSIS — G90.50 REFLEX SYMPATHETIC DYSTROPHY: ICD-10-CM

## 2018-05-04 DIAGNOSIS — I10 ESSENTIAL HYPERTENSION: ICD-10-CM

## 2018-05-04 DIAGNOSIS — R53.1 WEAKNESS: ICD-10-CM

## 2018-05-04 DIAGNOSIS — G43.909 MIGRAINE: ICD-10-CM

## 2018-05-04 DIAGNOSIS — Z87.19 HISTORY OF PANCREATITIS: ICD-10-CM

## 2018-05-04 DIAGNOSIS — R61 EXCESSIVE SWEATING: ICD-10-CM

## 2018-05-04 DIAGNOSIS — E03.9 ACQUIRED HYPOTHYROIDISM: ICD-10-CM

## 2018-05-04 DIAGNOSIS — R10.9 ABDOMINAL PAIN: ICD-10-CM

## 2018-05-04 DIAGNOSIS — G89.4 CHRONIC PAIN SYNDROME: ICD-10-CM

## 2018-05-04 LAB
ERYTHROCYTE [DISTWIDTH] IN BLOOD BY AUTOMATED COUNT: 12.7 % (ref 11–14.5)
FSH SERPL-ACNC: 51.7 MIU/ML
HCT VFR BLD AUTO: 37 % (ref 35–47)
HGB BLD-MCNC: 12.7 G/DL (ref 12–16)
LIPASE SERPL-CCNC: 26 U/L (ref 0–52)
MCH RBC QN AUTO: 31.4 PG (ref 27–34)
MCHC RBC AUTO-ENTMCNC: 34.3 G/DL (ref 32–36)
MCV RBC AUTO: 91 FL (ref 80–100)
PLATELET # BLD AUTO: 169 THOU/UL (ref 140–440)
PMV BLD AUTO: 7.3 FL (ref 7–10)
RBC # BLD AUTO: 4.05 MILL/UL (ref 3.8–5.4)
TSH SERPL DL<=0.005 MIU/L-ACNC: 1.08 UIU/ML (ref 0.3–5)
WBC: 5.6 THOU/UL (ref 4–11)

## 2018-05-07 ENCOUNTER — OFFICE VISIT - HEALTHEAST (OUTPATIENT)
Dept: FAMILY MEDICINE | Facility: CLINIC | Age: 78
End: 2018-05-07

## 2018-05-07 DIAGNOSIS — R41.0 CONFUSION: ICD-10-CM

## 2018-05-07 DIAGNOSIS — G47.00 INSOMNIA: ICD-10-CM

## 2018-05-07 DIAGNOSIS — G89.29 CHRONIC PAIN: ICD-10-CM

## 2018-05-08 ENCOUNTER — COMMUNICATION - HEALTHEAST (OUTPATIENT)
Dept: FAMILY MEDICINE | Facility: CLINIC | Age: 78
End: 2018-05-08

## 2018-05-09 ENCOUNTER — COMMUNICATION - HEALTHEAST (OUTPATIENT)
Dept: FAMILY MEDICINE | Facility: CLINIC | Age: 78
End: 2018-05-09

## 2018-05-10 ENCOUNTER — RECORDS - HEALTHEAST (OUTPATIENT)
Dept: ADMINISTRATIVE | Facility: OTHER | Age: 78
End: 2018-05-10

## 2018-05-11 ENCOUNTER — HOSPITAL ENCOUNTER (OUTPATIENT)
Dept: PALLIATIVE MEDICINE | Facility: OTHER | Age: 78
Discharge: HOME | End: 2018-05-11
Attending: ANESTHESIOLOGY

## 2018-05-11 DIAGNOSIS — R52 PAIN: ICD-10-CM

## 2018-05-11 DIAGNOSIS — G89.4 CHRONIC PAIN SYNDROME: ICD-10-CM

## 2018-05-11 ASSESSMENT — MIFFLIN-ST. JEOR: SCORE: 1154.47

## 2018-05-12 ENCOUNTER — COMMUNICATION - HEALTHEAST (OUTPATIENT)
Dept: FAMILY MEDICINE | Facility: CLINIC | Age: 78
End: 2018-05-12

## 2018-05-14 ENCOUNTER — COMMUNICATION - HEALTHEAST (OUTPATIENT)
Dept: PALLIATIVE MEDICINE | Facility: OTHER | Age: 78
End: 2018-05-14

## 2018-05-14 ENCOUNTER — RECORDS - HEALTHEAST (OUTPATIENT)
Dept: ADMINISTRATIVE | Facility: OTHER | Age: 78
End: 2018-05-14

## 2018-05-14 DIAGNOSIS — G47.00 INSOMNIA: ICD-10-CM

## 2018-05-15 ENCOUNTER — COMMUNICATION - HEALTHEAST (OUTPATIENT)
Dept: FAMILY MEDICINE | Facility: CLINIC | Age: 78
End: 2018-05-15

## 2018-05-16 ENCOUNTER — COMMUNICATION - HEALTHEAST (OUTPATIENT)
Dept: NURSING | Facility: CLINIC | Age: 78
End: 2018-05-16

## 2018-05-18 ENCOUNTER — COMMUNICATION - HEALTHEAST (OUTPATIENT)
Dept: NURSING | Facility: CLINIC | Age: 78
End: 2018-05-18

## 2018-05-18 ENCOUNTER — COMMUNICATION - HEALTHEAST (OUTPATIENT)
Dept: PALLIATIVE MEDICINE | Facility: OTHER | Age: 78
End: 2018-05-18

## 2018-05-18 DIAGNOSIS — G47.00 INSOMNIA: ICD-10-CM

## 2018-05-18 DIAGNOSIS — R52 PAIN: ICD-10-CM

## 2018-05-25 ENCOUNTER — HOSPITAL ENCOUNTER (OUTPATIENT)
Dept: PALLIATIVE MEDICINE | Facility: OTHER | Age: 78
Discharge: HOME | End: 2018-05-25
Attending: ANESTHESIOLOGY

## 2018-05-25 ENCOUNTER — OFFICE VISIT - HEALTHEAST (OUTPATIENT)
Dept: FAMILY MEDICINE | Facility: CLINIC | Age: 78
End: 2018-05-25

## 2018-05-25 DIAGNOSIS — I10 ESSENTIAL HYPERTENSION: ICD-10-CM

## 2018-05-25 DIAGNOSIS — G47.00 INSOMNIA: ICD-10-CM

## 2018-05-25 DIAGNOSIS — G89.4 CHRONIC PAIN SYNDROME: ICD-10-CM

## 2018-05-25 DIAGNOSIS — S83.206A ACUTE MENISCAL TEAR OF RIGHT KNEE: ICD-10-CM

## 2018-05-25 DIAGNOSIS — R45.86 MOOD DISTURBANCE: ICD-10-CM

## 2018-05-25 ASSESSMENT — MIFFLIN-ST. JEOR: SCORE: 1145.4

## 2018-05-29 ENCOUNTER — COMMUNICATION - HEALTHEAST (OUTPATIENT)
Dept: PALLIATIVE MEDICINE | Facility: OTHER | Age: 78
End: 2018-05-29

## 2018-05-31 ENCOUNTER — OFFICE VISIT - HEALTHEAST (OUTPATIENT)
Dept: PODIATRY | Age: 78
End: 2018-05-31

## 2018-05-31 DIAGNOSIS — L60.0 INGROWN TOENAIL: ICD-10-CM

## 2018-05-31 ASSESSMENT — MIFFLIN-ST. JEOR: SCORE: 1145.4

## 2018-06-01 ENCOUNTER — COMMUNICATION - HEALTHEAST (OUTPATIENT)
Dept: PALLIATIVE MEDICINE | Facility: OTHER | Age: 78
End: 2018-06-01

## 2018-06-05 ENCOUNTER — COMMUNICATION - HEALTHEAST (OUTPATIENT)
Dept: NURSING | Facility: CLINIC | Age: 78
End: 2018-06-05

## 2018-06-07 ENCOUNTER — COMMUNICATION - HEALTHEAST (OUTPATIENT)
Dept: PALLIATIVE MEDICINE | Facility: CLINIC | Age: 78
End: 2018-06-07

## 2018-06-07 DIAGNOSIS — G89.4 CHRONIC PAIN SYNDROME: ICD-10-CM

## 2018-06-07 DIAGNOSIS — G47.00 INSOMNIA: ICD-10-CM

## 2018-06-07 DIAGNOSIS — G93.40 ACUTE ENCEPHALOPATHY: ICD-10-CM

## 2018-06-21 ENCOUNTER — COMMUNICATION - HEALTHEAST (OUTPATIENT)
Dept: NURSING | Facility: CLINIC | Age: 78
End: 2018-06-21

## 2018-06-21 ENCOUNTER — COMMUNICATION - HEALTHEAST (OUTPATIENT)
Dept: PALLIATIVE MEDICINE | Facility: OTHER | Age: 78
End: 2018-06-21

## 2018-06-21 DIAGNOSIS — R52 PAIN: ICD-10-CM

## 2018-06-22 ENCOUNTER — AMBULATORY - HEALTHEAST (OUTPATIENT)
Dept: SCHEDULING | Facility: CLINIC | Age: 78
End: 2018-06-22

## 2018-06-22 ENCOUNTER — OFFICE VISIT - HEALTHEAST (OUTPATIENT)
Dept: FAMILY MEDICINE | Facility: CLINIC | Age: 78
End: 2018-06-22

## 2018-06-22 DIAGNOSIS — M85.80 OSTEOPENIA: ICD-10-CM

## 2018-06-22 DIAGNOSIS — I10 BENIGN ESSENTIAL HYPERTENSION: ICD-10-CM

## 2018-06-22 DIAGNOSIS — R00.1 BRADYCARDIA: ICD-10-CM

## 2018-06-22 DIAGNOSIS — E78.2 MIXED HYPERLIPIDEMIA: ICD-10-CM

## 2018-06-22 DIAGNOSIS — E03.8 OTHER SPECIFIED HYPOTHYROIDISM: ICD-10-CM

## 2018-06-22 DIAGNOSIS — G44.009 CLUSTER HEADACHES: ICD-10-CM

## 2018-06-22 LAB
ANION GAP SERPL CALCULATED.3IONS-SCNC: 12 MMOL/L (ref 5–18)
BUN SERPL-MCNC: 14 MG/DL (ref 8–28)
CALCIUM SERPL-MCNC: 9.3 MG/DL (ref 8.5–10.5)
CHLORIDE BLD-SCNC: 107 MMOL/L (ref 98–107)
CO2 SERPL-SCNC: 21 MMOL/L (ref 22–31)
CREAT SERPL-MCNC: 0.93 MG/DL (ref 0.6–1.1)
GFR SERPL CREATININE-BSD FRML MDRD: 59 ML/MIN/1.73M2
GLUCOSE BLD-MCNC: 85 MG/DL (ref 70–125)
POTASSIUM BLD-SCNC: 4.4 MMOL/L (ref 3.5–5)
SODIUM SERPL-SCNC: 140 MMOL/L (ref 136–145)
TSH SERPL DL<=0.005 MIU/L-ACNC: 0.71 UIU/ML (ref 0.3–5)

## 2018-06-25 ENCOUNTER — COMMUNICATION - HEALTHEAST (OUTPATIENT)
Dept: FAMILY MEDICINE | Facility: CLINIC | Age: 78
End: 2018-06-25

## 2018-06-26 ENCOUNTER — COMMUNICATION - HEALTHEAST (OUTPATIENT)
Dept: SCHEDULING | Facility: CLINIC | Age: 78
End: 2018-06-26

## 2018-06-26 DIAGNOSIS — R00.1 BRADYCARDIA: ICD-10-CM

## 2018-06-28 ENCOUNTER — HOSPITAL ENCOUNTER (OUTPATIENT)
Dept: CARDIOLOGY | Facility: CLINIC | Age: 78
Discharge: HOME | End: 2018-06-28
Attending: FAMILY MEDICINE

## 2018-06-28 DIAGNOSIS — R00.1 BRADYCARDIA: ICD-10-CM

## 2018-06-29 ENCOUNTER — COMMUNICATION - HEALTHEAST (OUTPATIENT)
Dept: NURSING | Facility: CLINIC | Age: 78
End: 2018-06-29

## 2018-07-02 ENCOUNTER — HOSPITAL ENCOUNTER (OUTPATIENT)
Dept: PALLIATIVE MEDICINE | Facility: OTHER | Age: 78
Discharge: HOME | End: 2018-07-02
Attending: SOCIAL WORKER

## 2018-07-02 DIAGNOSIS — F41.1 GENERALIZED ANXIETY DISORDER: ICD-10-CM

## 2018-07-02 DIAGNOSIS — F33.2 SEVERE RECURRENT MAJOR DEPRESSION WITHOUT PSYCHOTIC FEATURES (H): ICD-10-CM

## 2018-07-02 DIAGNOSIS — G89.4 CHRONIC PAIN SYNDROME: ICD-10-CM

## 2018-07-06 ENCOUNTER — COMMUNICATION - HEALTHEAST (OUTPATIENT)
Dept: FAMILY MEDICINE | Facility: CLINIC | Age: 78
End: 2018-07-06

## 2018-07-06 ENCOUNTER — HOSPITAL ENCOUNTER (OUTPATIENT)
Dept: PALLIATIVE MEDICINE | Facility: OTHER | Age: 78
Discharge: HOME | End: 2018-07-06
Attending: ANESTHESIOLOGY

## 2018-07-06 DIAGNOSIS — G89.4 CHRONIC PAIN SYNDROME: ICD-10-CM

## 2018-07-06 DIAGNOSIS — F41.9 ANXIETY AND DEPRESSION: ICD-10-CM

## 2018-07-06 DIAGNOSIS — R52 PAIN: ICD-10-CM

## 2018-07-06 DIAGNOSIS — F32.A ANXIETY AND DEPRESSION: ICD-10-CM

## 2018-07-06 ASSESSMENT — MIFFLIN-ST. JEOR: SCORE: 1145.4

## 2018-07-09 ENCOUNTER — OFFICE VISIT - HEALTHEAST (OUTPATIENT)
Dept: FAMILY MEDICINE | Facility: CLINIC | Age: 78
End: 2018-07-09

## 2018-07-09 DIAGNOSIS — R20.2 ARM PARESTHESIA, RIGHT: ICD-10-CM

## 2018-07-09 DIAGNOSIS — G44.009 CLUSTER HEADACHES: ICD-10-CM

## 2018-07-09 DIAGNOSIS — R00.1 BRADYCARDIA: ICD-10-CM

## 2018-07-09 DIAGNOSIS — M25.552 HIP PAIN, LEFT: ICD-10-CM

## 2018-07-09 DIAGNOSIS — J01.90 ACUTE SINUSITIS WITH SYMPTOMS > 10 DAYS: ICD-10-CM

## 2018-07-09 DIAGNOSIS — M54.6 LEFT-SIDED THORACIC BACK PAIN: ICD-10-CM

## 2018-07-10 ENCOUNTER — OFFICE VISIT - HEALTHEAST (OUTPATIENT)
Dept: PHYSICAL MEDICINE AND REHAB | Facility: REHABILITATION | Age: 78
End: 2018-07-10

## 2018-07-10 ENCOUNTER — RECORDS - HEALTHEAST (OUTPATIENT)
Dept: GENERAL RADIOLOGY | Facility: CLINIC | Age: 78
End: 2018-07-10

## 2018-07-10 DIAGNOSIS — M25.552 PAIN IN LEFT HIP: ICD-10-CM

## 2018-07-10 DIAGNOSIS — M99.08 SOMATIC DYSFUNCTION OF RIB REGION: ICD-10-CM

## 2018-07-10 DIAGNOSIS — M99.02 SOMATIC DYSFUNCTION OF THORACIC REGION: ICD-10-CM

## 2018-07-10 DIAGNOSIS — M99.03 SOMATIC DYSFUNCTION OF LUMBAR REGION: ICD-10-CM

## 2018-07-10 DIAGNOSIS — M99.04 SOMATIC DYSFUNCTION OF SACROILIAC JOINT: ICD-10-CM

## 2018-07-10 DIAGNOSIS — M99.06 SOMATIC DYSFUNCTION OF LOWER EXTREMITY: ICD-10-CM

## 2018-07-10 DIAGNOSIS — M51.369 DEGENERATIVE LUMBAR DISC: ICD-10-CM

## 2018-07-10 DIAGNOSIS — M99.05 SOMATIC DYSFUNCTION OF PELVIS REGION: ICD-10-CM

## 2018-07-10 DIAGNOSIS — M79.18 MYOFASCIAL PAIN: ICD-10-CM

## 2018-07-10 DIAGNOSIS — G89.29 CHRONIC PAIN: ICD-10-CM

## 2018-07-10 DIAGNOSIS — M99.00 SOMATIC DYSFUNCTION OF HEAD REGION: ICD-10-CM

## 2018-07-10 DIAGNOSIS — R51.9 HEADACHE: ICD-10-CM

## 2018-07-10 DIAGNOSIS — M25.552 HIP PAIN, LEFT: ICD-10-CM

## 2018-07-10 DIAGNOSIS — M99.07 SOMATIC DYSFUNCTION OF UPPER EXTREMITY: ICD-10-CM

## 2018-07-10 DIAGNOSIS — M99.01 SOMATIC DYSFUNCTION OF CERVICAL REGION: ICD-10-CM

## 2018-07-10 ASSESSMENT — MIFFLIN-ST. JEOR: SCORE: 1145.4

## 2018-07-11 ENCOUNTER — COMMUNICATION - HEALTHEAST (OUTPATIENT)
Dept: PHYSICAL MEDICINE AND REHAB | Facility: CLINIC | Age: 78
End: 2018-07-11

## 2018-07-13 ENCOUNTER — OFFICE VISIT - HEALTHEAST (OUTPATIENT)
Dept: PHYSICAL THERAPY | Facility: REHABILITATION | Age: 78
End: 2018-07-13

## 2018-07-13 DIAGNOSIS — M54.6 THORACIC SPINE PAIN: ICD-10-CM

## 2018-07-13 DIAGNOSIS — M54.12 CERVICAL RADICULITIS: ICD-10-CM

## 2018-07-13 DIAGNOSIS — M79.18 DIFFUSE MYOFASCIAL PAIN SYNDROME: ICD-10-CM

## 2018-07-13 DIAGNOSIS — M54.16 LUMBAR RADICULOPATHY: ICD-10-CM

## 2018-07-13 DIAGNOSIS — G89.4 CHRONIC PAIN DISORDER: ICD-10-CM

## 2018-07-16 ENCOUNTER — OFFICE VISIT - HEALTHEAST (OUTPATIENT)
Dept: PHYSICAL THERAPY | Facility: REHABILITATION | Age: 78
End: 2018-07-16

## 2018-07-16 DIAGNOSIS — G89.4 CHRONIC PAIN DISORDER: ICD-10-CM

## 2018-07-16 DIAGNOSIS — M54.12 CERVICAL RADICULITIS: ICD-10-CM

## 2018-07-16 DIAGNOSIS — M54.16 LUMBAR RADICULOPATHY: ICD-10-CM

## 2018-07-16 DIAGNOSIS — M79.18 DIFFUSE MYOFASCIAL PAIN SYNDROME: ICD-10-CM

## 2018-07-16 DIAGNOSIS — M54.6 THORACIC SPINE PAIN: ICD-10-CM

## 2018-07-18 ENCOUNTER — COMMUNICATION - HEALTHEAST (OUTPATIENT)
Dept: PALLIATIVE MEDICINE | Facility: OTHER | Age: 78
End: 2018-07-18

## 2018-07-18 DIAGNOSIS — M19.90 OSTEOARTHRITIS: ICD-10-CM

## 2018-07-20 ENCOUNTER — COMMUNICATION - HEALTHEAST (OUTPATIENT)
Dept: PALLIATIVE MEDICINE | Facility: OTHER | Age: 78
End: 2018-07-20

## 2018-07-20 ENCOUNTER — COMMUNICATION - HEALTHEAST (OUTPATIENT)
Dept: FAMILY MEDICINE | Facility: CLINIC | Age: 78
End: 2018-07-20

## 2018-07-23 ENCOUNTER — COMMUNICATION - HEALTHEAST (OUTPATIENT)
Dept: FAMILY MEDICINE | Facility: CLINIC | Age: 78
End: 2018-07-23

## 2018-07-23 ENCOUNTER — OFFICE VISIT - HEALTHEAST (OUTPATIENT)
Dept: FAMILY MEDICINE | Facility: CLINIC | Age: 78
End: 2018-07-23

## 2018-07-23 ENCOUNTER — OFFICE VISIT - HEALTHEAST (OUTPATIENT)
Dept: PHYSICAL THERAPY | Facility: REHABILITATION | Age: 78
End: 2018-07-23

## 2018-07-23 DIAGNOSIS — M54.16 LUMBAR RADICULOPATHY: ICD-10-CM

## 2018-07-23 DIAGNOSIS — M54.6 THORACIC SPINE PAIN: ICD-10-CM

## 2018-07-23 DIAGNOSIS — R19.7 DIARRHEA: ICD-10-CM

## 2018-07-23 DIAGNOSIS — G44.009 CLUSTER HEADACHES: ICD-10-CM

## 2018-07-23 DIAGNOSIS — G89.4 CHRONIC PAIN DISORDER: ICD-10-CM

## 2018-07-23 DIAGNOSIS — G90.523 COMPLEX REGIONAL PAIN SYNDROME TYPE 1 OF BOTH LOWER EXTREMITIES: ICD-10-CM

## 2018-07-23 DIAGNOSIS — G89.4 CHRONIC PAIN SYNDROME: ICD-10-CM

## 2018-07-23 DIAGNOSIS — K85.90 ACUTE PANCREATITIS, UNSPECIFIED COMPLICATION STATUS, UNSPECIFIED PANCREATITIS TYPE: ICD-10-CM

## 2018-07-23 DIAGNOSIS — M54.12 CERVICAL RADICULITIS: ICD-10-CM

## 2018-07-23 DIAGNOSIS — E03.9 ACQUIRED HYPOTHYROIDISM: ICD-10-CM

## 2018-07-23 DIAGNOSIS — M79.18 DIFFUSE MYOFASCIAL PAIN SYNDROME: ICD-10-CM

## 2018-07-23 LAB
ALBUMIN SERPL-MCNC: 4.5 G/DL (ref 3.5–5)
ALP SERPL-CCNC: 67 U/L (ref 45–120)
ALT SERPL W P-5'-P-CCNC: 29 U/L (ref 0–45)
ANION GAP SERPL CALCULATED.3IONS-SCNC: 15 MMOL/L (ref 5–18)
AST SERPL W P-5'-P-CCNC: 39 U/L (ref 0–40)
BASOPHILS # BLD AUTO: 0.1 THOU/UL (ref 0–0.2)
BASOPHILS NFR BLD AUTO: 1 % (ref 0–2)
BILIRUB SERPL-MCNC: 1.3 MG/DL (ref 0–1)
BUN SERPL-MCNC: 26 MG/DL (ref 8–28)
CALCIUM SERPL-MCNC: 9.7 MG/DL (ref 8.5–10.5)
CHLORIDE BLD-SCNC: 106 MMOL/L (ref 98–107)
CO2 SERPL-SCNC: 13 MMOL/L (ref 22–31)
CREAT SERPL-MCNC: 0.95 MG/DL (ref 0.6–1.1)
EOSINOPHIL # BLD AUTO: 0.1 THOU/UL (ref 0–0.4)
EOSINOPHIL NFR BLD AUTO: 1 % (ref 0–6)
ERYTHROCYTE [DISTWIDTH] IN BLOOD BY AUTOMATED COUNT: 13.3 % (ref 11–14.5)
GFR SERPL CREATININE-BSD FRML MDRD: 57 ML/MIN/1.73M2
GLUCOSE BLD-MCNC: 106 MG/DL (ref 70–125)
HCT VFR BLD AUTO: 46.9 % (ref 35–47)
HGB BLD-MCNC: 15.6 G/DL (ref 12–16)
LIPASE SERPL-CCNC: 1431 U/L (ref 0–52)
LYMPHOCYTES # BLD AUTO: 2.3 THOU/UL (ref 0.8–4.4)
LYMPHOCYTES NFR BLD AUTO: 25 % (ref 20–40)
MAGNESIUM SERPL-MCNC: 2.5 MG/DL (ref 1.8–2.6)
MCH RBC QN AUTO: 31.6 PG (ref 27–34)
MCHC RBC AUTO-ENTMCNC: 33.2 G/DL (ref 32–36)
MCV RBC AUTO: 95 FL (ref 80–100)
MONOCYTES # BLD AUTO: 0.8 THOU/UL (ref 0–0.9)
MONOCYTES NFR BLD AUTO: 8 % (ref 2–10)
NEUTROPHILS # BLD AUTO: 5.9 THOU/UL (ref 2–7.7)
NEUTROPHILS NFR BLD AUTO: 65 % (ref 50–70)
PLATELET # BLD AUTO: 237 THOU/UL (ref 140–440)
PMV BLD AUTO: 7.2 FL (ref 7–10)
POTASSIUM BLD-SCNC: 4.3 MMOL/L (ref 3.5–5)
PROT SERPL-MCNC: 7.4 G/DL (ref 6–8)
RBC # BLD AUTO: 4.94 MILL/UL (ref 3.8–5.4)
SODIUM SERPL-SCNC: 134 MMOL/L (ref 136–145)
T4 FREE SERPL-MCNC: 1.3 NG/DL (ref 0.7–1.8)
TSH SERPL DL<=0.005 MIU/L-ACNC: 0.55 UIU/ML (ref 0.3–5)
WBC: 9.1 THOU/UL (ref 4–11)

## 2018-07-24 ENCOUNTER — COMMUNICATION - HEALTHEAST (OUTPATIENT)
Dept: SCHEDULING | Facility: CLINIC | Age: 78
End: 2018-07-24

## 2018-07-24 ENCOUNTER — OFFICE VISIT - HEALTHEAST (OUTPATIENT)
Dept: PHYSICAL MEDICINE AND REHAB | Facility: REHABILITATION | Age: 78
End: 2018-07-24

## 2018-07-24 DIAGNOSIS — M99.00 SOMATIC DYSFUNCTION OF HEAD REGION: ICD-10-CM

## 2018-07-24 DIAGNOSIS — M99.05 SOMATIC DYSFUNCTION OF PELVIS REGION: ICD-10-CM

## 2018-07-24 DIAGNOSIS — M79.18 MYOFASCIAL PAIN: ICD-10-CM

## 2018-07-24 DIAGNOSIS — M99.01 SOMATIC DYSFUNCTION OF CERVICAL REGION: ICD-10-CM

## 2018-07-24 DIAGNOSIS — M99.02 SOMATIC DYSFUNCTION OF THORACIC REGION: ICD-10-CM

## 2018-07-24 DIAGNOSIS — G89.29 CHRONIC PAIN: ICD-10-CM

## 2018-07-24 DIAGNOSIS — M99.06 SOMATIC DYSFUNCTION OF LOWER EXTREMITY: ICD-10-CM

## 2018-07-24 DIAGNOSIS — R51.9 HEADACHE: ICD-10-CM

## 2018-07-24 DIAGNOSIS — M99.07 SOMATIC DYSFUNCTION OF UPPER EXTREMITY: ICD-10-CM

## 2018-07-24 DIAGNOSIS — M99.08 SOMATIC DYSFUNCTION OF RIB REGION: ICD-10-CM

## 2018-07-24 DIAGNOSIS — M99.04 SOMATIC DYSFUNCTION OF SACROILIAC JOINT: ICD-10-CM

## 2018-07-24 DIAGNOSIS — M25.552 HIP PAIN, LEFT: ICD-10-CM

## 2018-07-24 DIAGNOSIS — M99.03 SOMATIC DYSFUNCTION OF LUMBAR REGION: ICD-10-CM

## 2018-07-24 ASSESSMENT — MIFFLIN-ST. JEOR: SCORE: 1118.18

## 2018-07-25 ENCOUNTER — RECORDS - HEALTHEAST (OUTPATIENT)
Dept: ADMINISTRATIVE | Facility: OTHER | Age: 78
End: 2018-07-25

## 2018-07-26 ENCOUNTER — OFFICE VISIT - HEALTHEAST (OUTPATIENT)
Dept: CARDIOLOGY | Facility: CLINIC | Age: 78
End: 2018-07-26

## 2018-07-26 ENCOUNTER — HOSPITAL ENCOUNTER (OUTPATIENT)
Dept: ULTRASOUND IMAGING | Facility: CLINIC | Age: 78
Discharge: HOME | End: 2018-07-26
Attending: INTERNAL MEDICINE

## 2018-07-26 ENCOUNTER — COMMUNICATION - HEALTHEAST (OUTPATIENT)
Dept: FAMILY MEDICINE | Facility: CLINIC | Age: 78
End: 2018-07-26

## 2018-07-26 DIAGNOSIS — R42 DIZZINESS: ICD-10-CM

## 2018-07-26 DIAGNOSIS — I65.23 BILATERAL CAROTID ARTERY STENOSIS: ICD-10-CM

## 2018-07-26 DIAGNOSIS — I10 ESSENTIAL HYPERTENSION: ICD-10-CM

## 2018-07-26 DIAGNOSIS — R07.9 ACUTE CHEST PAIN: ICD-10-CM

## 2018-07-26 DIAGNOSIS — I77.9 BILATERAL CAROTID ARTERY DISEASE (H): ICD-10-CM

## 2018-07-26 DIAGNOSIS — E78.2 MIXED HYPERLIPIDEMIA: ICD-10-CM

## 2018-07-26 ASSESSMENT — MIFFLIN-ST. JEOR: SCORE: 1127.25

## 2018-07-27 ENCOUNTER — AMBULATORY - HEALTHEAST (OUTPATIENT)
Dept: LAB | Facility: CLINIC | Age: 78
End: 2018-07-27

## 2018-07-27 DIAGNOSIS — R19.7 DIARRHEA: ICD-10-CM

## 2018-07-29 LAB — O+P STL MICRO: NORMAL

## 2018-07-30 ENCOUNTER — RECORDS - HEALTHEAST (OUTPATIENT)
Dept: ADMINISTRATIVE | Facility: OTHER | Age: 78
End: 2018-07-30

## 2018-07-30 ENCOUNTER — AMBULATORY - HEALTHEAST (OUTPATIENT)
Dept: LAB | Facility: CLINIC | Age: 78
End: 2018-07-30

## 2018-07-30 ENCOUNTER — OFFICE VISIT - HEALTHEAST (OUTPATIENT)
Dept: PHYSICAL THERAPY | Facility: REHABILITATION | Age: 78
End: 2018-07-30

## 2018-07-30 DIAGNOSIS — M54.6 THORACIC SPINE PAIN: ICD-10-CM

## 2018-07-30 DIAGNOSIS — R19.7 DIARRHEA: ICD-10-CM

## 2018-07-30 DIAGNOSIS — M79.18 DIFFUSE MYOFASCIAL PAIN SYNDROME: ICD-10-CM

## 2018-07-30 DIAGNOSIS — M54.12 CERVICAL RADICULITIS: ICD-10-CM

## 2018-07-30 DIAGNOSIS — M54.16 LUMBAR RADICULOPATHY: ICD-10-CM

## 2018-07-30 DIAGNOSIS — G89.4 CHRONIC PAIN DISORDER: ICD-10-CM

## 2018-07-31 ENCOUNTER — COMMUNICATION - HEALTHEAST (OUTPATIENT)
Dept: CARDIOLOGY | Facility: CLINIC | Age: 78
End: 2018-07-31

## 2018-07-31 ENCOUNTER — COMMUNICATION - HEALTHEAST (OUTPATIENT)
Dept: PALLIATIVE MEDICINE | Facility: OTHER | Age: 78
End: 2018-07-31

## 2018-07-31 LAB
SHIGA TOXIN 1: NEGATIVE
SHIGA TOXIN 2: NEGATIVE

## 2018-08-01 ENCOUNTER — RECORDS - HEALTHEAST (OUTPATIENT)
Dept: ADMINISTRATIVE | Facility: OTHER | Age: 78
End: 2018-08-01

## 2018-08-01 ENCOUNTER — HOSPITAL ENCOUNTER (OUTPATIENT)
Dept: PALLIATIVE MEDICINE | Facility: OTHER | Age: 78
Discharge: HOME | End: 2018-08-01
Attending: ANESTHESIOLOGY | Admitting: PAIN MEDICINE

## 2018-08-01 ENCOUNTER — HOSPITAL ENCOUNTER (OUTPATIENT)
Dept: PALLIATIVE MEDICINE | Facility: OTHER | Age: 78
Discharge: HOME | End: 2018-08-01
Attending: ANESTHESIOLOGY

## 2018-08-01 DIAGNOSIS — M19.90 OSTEOARTHRITIS: ICD-10-CM

## 2018-08-01 DIAGNOSIS — F33.2 SEVERE RECURRENT MAJOR DEPRESSION WITHOUT PSYCHOTIC FEATURES (H): ICD-10-CM

## 2018-08-01 DIAGNOSIS — F41.1 GENERALIZED ANXIETY DISORDER: ICD-10-CM

## 2018-08-01 DIAGNOSIS — G89.4 CHRONIC PAIN SYNDROME: ICD-10-CM

## 2018-08-01 ASSESSMENT — MIFFLIN-ST. JEOR: SCORE: 1109.11

## 2018-08-02 ENCOUNTER — HOSPITAL ENCOUNTER (OUTPATIENT)
Dept: CT IMAGING | Facility: CLINIC | Age: 78
Discharge: HOME | End: 2018-08-02
Attending: INTERNAL MEDICINE

## 2018-08-02 ENCOUNTER — COMMUNICATION - HEALTHEAST (OUTPATIENT)
Dept: PALLIATIVE MEDICINE | Facility: OTHER | Age: 78
End: 2018-08-02

## 2018-08-02 ENCOUNTER — COMMUNICATION - HEALTHEAST (OUTPATIENT)
Dept: NURSING | Facility: CLINIC | Age: 78
End: 2018-08-02

## 2018-08-02 DIAGNOSIS — R07.9 ACUTE CHEST PAIN: ICD-10-CM

## 2018-08-02 LAB
BACTERIA SPEC CULT: NORMAL
BSA FOR ECHO PROCEDURE: 1.72 M2
CV CALCIUM SCORE AGATSTON LM: 5
CV CALCIUM SCORING AGATSON LAD: 35
CV CALCIUM SCORING AGATSTON CX: 25
CV CALCIUM SCORING AGATSTON RCA: 95
CV CALCIUM SCORING AGATSTON TOTAL: 160
LEFT VENTRICLE HEART RATE: 68 BPM

## 2018-08-02 ASSESSMENT — MIFFLIN-ST. JEOR: SCORE: 1109.11

## 2018-08-03 ENCOUNTER — AMBULATORY - HEALTHEAST (OUTPATIENT)
Dept: LAB | Facility: CLINIC | Age: 78
End: 2018-08-03

## 2018-08-03 DIAGNOSIS — N18.31 CHRONIC KIDNEY DISEASE (CKD) STAGE G3A/A1, MODERATELY DECREASED GLOMERULAR FILTRATION RATE (GFR) BETWEEN 45-59 ML/MIN/1.73 SQUARE METER AND ALBUMINURIA CREATININE RATIO LESS THAN 30 MG/G (H): ICD-10-CM

## 2018-08-03 LAB
CREAT SERPL-MCNC: 0.81 MG/DL (ref 0.6–1.1)
GFR SERPL CREATININE-BSD FRML MDRD: >60 ML/MIN/1.73M2

## 2018-08-06 ENCOUNTER — COMMUNICATION - HEALTHEAST (OUTPATIENT)
Dept: PALLIATIVE MEDICINE | Facility: OTHER | Age: 78
End: 2018-08-06

## 2018-08-06 ENCOUNTER — OFFICE VISIT - HEALTHEAST (OUTPATIENT)
Dept: PHYSICAL THERAPY | Facility: REHABILITATION | Age: 78
End: 2018-08-06

## 2018-08-06 ENCOUNTER — RECORDS - HEALTHEAST (OUTPATIENT)
Dept: ADMINISTRATIVE | Facility: OTHER | Age: 78
End: 2018-08-06

## 2018-08-06 DIAGNOSIS — G90.523 COMPLEX REGIONAL PAIN SYNDROME TYPE 1 OF BOTH LOWER EXTREMITIES: ICD-10-CM

## 2018-08-06 DIAGNOSIS — G89.4 CHRONIC PAIN SYNDROME: ICD-10-CM

## 2018-08-06 DIAGNOSIS — G44.029 CHRONIC CLUSTER HEADACHE, NOT INTRACTABLE: ICD-10-CM

## 2018-08-06 DIAGNOSIS — M48.07 STENOSIS OF LATERAL RECESS OF LUMBOSACRAL SPINE: ICD-10-CM

## 2018-08-06 DIAGNOSIS — M54.50 ACUTE RIGHT-SIDED LOW BACK PAIN WITHOUT SCIATICA: ICD-10-CM

## 2018-08-06 DIAGNOSIS — M26.629 TEMPOROMANDIBULAR JOINT-PAIN-DYSFUNCTION SYNDROME (TMJ): ICD-10-CM

## 2018-08-07 ENCOUNTER — RECORDS - HEALTHEAST (OUTPATIENT)
Dept: ADMINISTRATIVE | Facility: OTHER | Age: 78
End: 2018-08-07

## 2018-08-07 ENCOUNTER — COMMUNICATION - HEALTHEAST (OUTPATIENT)
Dept: FAMILY MEDICINE | Facility: CLINIC | Age: 78
End: 2018-08-07

## 2018-08-08 ENCOUNTER — OFFICE VISIT - HEALTHEAST (OUTPATIENT)
Dept: FAMILY MEDICINE | Facility: CLINIC | Age: 78
End: 2018-08-08

## 2018-08-08 DIAGNOSIS — G89.4 CHRONIC PAIN SYNDROME: ICD-10-CM

## 2018-08-08 DIAGNOSIS — B02.9 SHINGLES: ICD-10-CM

## 2018-08-08 DIAGNOSIS — I10 ESSENTIAL HYPERTENSION: ICD-10-CM

## 2018-08-08 DIAGNOSIS — E78.2 MIXED HYPERLIPIDEMIA: ICD-10-CM

## 2018-08-09 ENCOUNTER — COMMUNICATION - HEALTHEAST (OUTPATIENT)
Dept: CARDIOLOGY | Facility: CLINIC | Age: 78
End: 2018-08-09

## 2018-08-13 ENCOUNTER — HOSPITAL ENCOUNTER (OUTPATIENT)
Dept: PALLIATIVE MEDICINE | Facility: OTHER | Age: 78
Discharge: HOME | End: 2018-08-13
Attending: ANESTHESIOLOGY

## 2018-08-13 ENCOUNTER — HOSPITAL ENCOUNTER (OUTPATIENT)
Dept: PALLIATIVE MEDICINE | Facility: OTHER | Age: 78
Discharge: HOME | End: 2018-08-13
Attending: SOCIAL WORKER

## 2018-08-13 ENCOUNTER — AMBULATORY - HEALTHEAST (OUTPATIENT)
Dept: ENDOCRINOLOGY | Facility: CLINIC | Age: 78
End: 2018-08-13

## 2018-08-13 DIAGNOSIS — F32.A ANXIETY AND DEPRESSION: ICD-10-CM

## 2018-08-13 DIAGNOSIS — G90.523 COMPLEX REGIONAL PAIN SYNDROME TYPE 1 OF BOTH LOWER EXTREMITIES: ICD-10-CM

## 2018-08-13 DIAGNOSIS — F41.9 ANXIETY AND DEPRESSION: ICD-10-CM

## 2018-08-13 DIAGNOSIS — F41.1 GENERALIZED ANXIETY DISORDER: ICD-10-CM

## 2018-08-13 DIAGNOSIS — R52 PAIN: ICD-10-CM

## 2018-08-13 DIAGNOSIS — F33.2 SEVERE RECURRENT MAJOR DEPRESSION WITHOUT PSYCHOTIC FEATURES (H): ICD-10-CM

## 2018-08-13 DIAGNOSIS — G89.4 CHRONIC PAIN SYNDROME: ICD-10-CM

## 2018-08-13 ASSESSMENT — MIFFLIN-ST. JEOR: SCORE: 1138.59

## 2018-08-14 ENCOUNTER — RECORDS - HEALTHEAST (OUTPATIENT)
Dept: ADMINISTRATIVE | Facility: OTHER | Age: 78
End: 2018-08-14

## 2018-08-15 ENCOUNTER — OFFICE VISIT - HEALTHEAST (OUTPATIENT)
Dept: PHYSICAL THERAPY | Facility: REHABILITATION | Age: 78
End: 2018-08-15

## 2018-08-15 DIAGNOSIS — M54.50 LEFT-SIDED LOW BACK PAIN WITHOUT SCIATICA, UNSPECIFIED CHRONICITY: ICD-10-CM

## 2018-08-15 DIAGNOSIS — M54.16 LUMBAR RADICULOPATHY: ICD-10-CM

## 2018-08-15 DIAGNOSIS — M54.50 ACUTE RIGHT-SIDED LOW BACK PAIN WITHOUT SCIATICA: ICD-10-CM

## 2018-08-15 DIAGNOSIS — G89.4 CHRONIC PAIN SYNDROME: ICD-10-CM

## 2018-08-15 DIAGNOSIS — R10.32 LEFT LOWER QUADRANT PAIN: ICD-10-CM

## 2018-08-17 ENCOUNTER — RECORDS - HEALTHEAST (OUTPATIENT)
Dept: ADMINISTRATIVE | Facility: OTHER | Age: 78
End: 2018-08-17

## 2018-08-20 ENCOUNTER — COMMUNICATION - HEALTHEAST (OUTPATIENT)
Dept: PALLIATIVE MEDICINE | Facility: OTHER | Age: 78
End: 2018-08-20

## 2018-08-20 ENCOUNTER — OFFICE VISIT - HEALTHEAST (OUTPATIENT)
Dept: PHYSICAL THERAPY | Facility: REHABILITATION | Age: 78
End: 2018-08-20

## 2018-08-20 DIAGNOSIS — M79.18 DIFFUSE MYOFASCIAL PAIN SYNDROME: ICD-10-CM

## 2018-08-20 DIAGNOSIS — M54.50 LEFT-SIDED LOW BACK PAIN WITHOUT SCIATICA, UNSPECIFIED CHRONICITY: ICD-10-CM

## 2018-08-20 DIAGNOSIS — M54.50 ACUTE EXACERBATION OF CHRONIC LOW BACK PAIN: ICD-10-CM

## 2018-08-20 DIAGNOSIS — G89.4 CHRONIC PAIN SYNDROME: ICD-10-CM

## 2018-08-20 DIAGNOSIS — M48.07 STENOSIS OF LATERAL RECESS OF LUMBOSACRAL SPINE: ICD-10-CM

## 2018-08-20 DIAGNOSIS — G90.523 COMPLEX REGIONAL PAIN SYNDROME TYPE 1 OF BOTH LOWER EXTREMITIES: ICD-10-CM

## 2018-08-20 DIAGNOSIS — R52 PAIN: ICD-10-CM

## 2018-08-20 DIAGNOSIS — G89.29 ACUTE EXACERBATION OF CHRONIC LOW BACK PAIN: ICD-10-CM

## 2018-08-20 DIAGNOSIS — M54.16 LUMBAR RADICULOPATHY: ICD-10-CM

## 2018-08-21 ENCOUNTER — OFFICE VISIT - HEALTHEAST (OUTPATIENT)
Dept: PHYSICAL THERAPY | Facility: REHABILITATION | Age: 78
End: 2018-08-21

## 2018-08-21 DIAGNOSIS — G89.4 CHRONIC PAIN SYNDROME: ICD-10-CM

## 2018-08-21 DIAGNOSIS — M79.18 DIFFUSE MYOFASCIAL PAIN SYNDROME: ICD-10-CM

## 2018-08-21 DIAGNOSIS — M54.12 CERVICAL RADICULITIS: ICD-10-CM

## 2018-08-21 DIAGNOSIS — M54.6 THORACIC SPINE PAIN: ICD-10-CM

## 2018-08-21 DIAGNOSIS — M54.16 LUMBAR RADICULOPATHY: ICD-10-CM

## 2018-08-22 ENCOUNTER — COMMUNICATION - HEALTHEAST (OUTPATIENT)
Dept: SCHEDULING | Facility: CLINIC | Age: 78
End: 2018-08-22

## 2018-08-23 ENCOUNTER — OFFICE VISIT - HEALTHEAST (OUTPATIENT)
Dept: FAMILY MEDICINE | Facility: CLINIC | Age: 78
End: 2018-08-23

## 2018-08-23 DIAGNOSIS — R19.7 DIARRHEA: ICD-10-CM

## 2018-08-23 LAB
ANION GAP SERPL CALCULATED.3IONS-SCNC: 8 MMOL/L (ref 5–18)
BUN SERPL-MCNC: 14 MG/DL (ref 8–28)
CALCIUM SERPL-MCNC: 9.1 MG/DL (ref 8.5–10.5)
CHLORIDE BLD-SCNC: 109 MMOL/L (ref 98–107)
CO2 SERPL-SCNC: 22 MMOL/L (ref 22–31)
CREAT SERPL-MCNC: 0.81 MG/DL (ref 0.6–1.1)
GFR SERPL CREATININE-BSD FRML MDRD: >60 ML/MIN/1.73M2
GLUCOSE BLD-MCNC: 82 MG/DL (ref 70–125)
LIPASE SERPL-CCNC: 30 U/L (ref 0–52)
POTASSIUM BLD-SCNC: 4 MMOL/L (ref 3.5–5)
SODIUM SERPL-SCNC: 139 MMOL/L (ref 136–145)

## 2018-08-23 ASSESSMENT — MIFFLIN-ST. JEOR: SCORE: 1119.88

## 2018-08-24 ENCOUNTER — AMBULATORY - HEALTHEAST (OUTPATIENT)
Dept: LAB | Facility: CLINIC | Age: 78
End: 2018-08-24

## 2018-08-24 DIAGNOSIS — R19.7 DIARRHEA: ICD-10-CM

## 2018-08-27 LAB — O+P STL MICRO: NORMAL

## 2018-08-28 ENCOUNTER — OFFICE VISIT - HEALTHEAST (OUTPATIENT)
Dept: PHYSICAL THERAPY | Facility: REHABILITATION | Age: 78
End: 2018-08-28

## 2018-08-28 DIAGNOSIS — G89.4 CHRONIC PAIN SYNDROME: ICD-10-CM

## 2018-08-28 DIAGNOSIS — M79.18 DIFFUSE MYOFASCIAL PAIN SYNDROME: ICD-10-CM

## 2018-08-28 DIAGNOSIS — M54.12 CERVICAL RADICULITIS: ICD-10-CM

## 2018-08-28 DIAGNOSIS — M54.16 LUMBAR RADICULOPATHY: ICD-10-CM

## 2018-08-28 DIAGNOSIS — M54.6 THORACIC SPINE PAIN: ICD-10-CM

## 2018-09-04 ENCOUNTER — OFFICE VISIT - HEALTHEAST (OUTPATIENT)
Dept: FAMILY MEDICINE | Facility: CLINIC | Age: 78
End: 2018-09-04

## 2018-09-04 ENCOUNTER — HOSPITAL ENCOUNTER (OUTPATIENT)
Dept: PALLIATIVE MEDICINE | Facility: OTHER | Age: 78
Discharge: HOME | End: 2018-09-04
Attending: ANESTHESIOLOGY

## 2018-09-04 ENCOUNTER — HOSPITAL ENCOUNTER (OUTPATIENT)
Dept: PALLIATIVE MEDICINE | Facility: OTHER | Age: 78
Discharge: HOME | End: 2018-09-04
Attending: SOCIAL WORKER

## 2018-09-04 DIAGNOSIS — G89.4 CHRONIC PAIN SYNDROME: ICD-10-CM

## 2018-09-04 DIAGNOSIS — I10 ESSENTIAL HYPERTENSION: ICD-10-CM

## 2018-09-04 DIAGNOSIS — G90.523 COMPLEX REGIONAL PAIN SYNDROME TYPE 1 OF BOTH LOWER EXTREMITIES: ICD-10-CM

## 2018-09-04 DIAGNOSIS — R52 PAIN: ICD-10-CM

## 2018-09-04 DIAGNOSIS — K83.4 SPHINCTER OF ODDI DYSFUNCTION: ICD-10-CM

## 2018-09-04 DIAGNOSIS — G44.009 CLUSTER HEADACHES: ICD-10-CM

## 2018-09-04 DIAGNOSIS — F33.2 SEVERE RECURRENT MAJOR DEPRESSION WITHOUT PSYCHOTIC FEATURES (H): ICD-10-CM

## 2018-09-04 DIAGNOSIS — E78.2 MIXED HYPERLIPIDEMIA: ICD-10-CM

## 2018-09-04 DIAGNOSIS — M48.07 STENOSIS OF LATERAL RECESS OF LUMBOSACRAL SPINE: ICD-10-CM

## 2018-09-04 DIAGNOSIS — L60.0 INGROWN NAIL: ICD-10-CM

## 2018-09-04 DIAGNOSIS — F41.1 GENERALIZED ANXIETY DISORDER: ICD-10-CM

## 2018-09-04 DIAGNOSIS — G93.40 ACUTE ENCEPHALOPATHY: ICD-10-CM

## 2018-09-04 DIAGNOSIS — G47.00 INSOMNIA: ICD-10-CM

## 2018-09-04 DIAGNOSIS — R19.7 DIARRHEA: ICD-10-CM

## 2018-09-04 ASSESSMENT — MIFFLIN-ST. JEOR: SCORE: 1110.02

## 2018-09-05 ENCOUNTER — OFFICE VISIT - HEALTHEAST (OUTPATIENT)
Dept: PHYSICAL THERAPY | Facility: REHABILITATION | Age: 78
End: 2018-09-05

## 2018-09-05 DIAGNOSIS — M79.18 DIFFUSE MYOFASCIAL PAIN SYNDROME: ICD-10-CM

## 2018-09-05 DIAGNOSIS — G89.4 CHRONIC PAIN SYNDROME: ICD-10-CM

## 2018-09-05 DIAGNOSIS — M54.12 CERVICAL RADICULITIS: ICD-10-CM

## 2018-09-05 DIAGNOSIS — M54.6 THORACIC SPINE PAIN: ICD-10-CM

## 2018-09-05 DIAGNOSIS — M54.16 LUMBAR RADICULOPATHY: ICD-10-CM

## 2018-09-07 ENCOUNTER — OFFICE VISIT - HEALTHEAST (OUTPATIENT)
Dept: FAMILY MEDICINE | Facility: CLINIC | Age: 78
End: 2018-09-07

## 2018-09-07 DIAGNOSIS — L60.0 INGROWN NAIL: ICD-10-CM

## 2018-09-10 ENCOUNTER — OFFICE VISIT - HEALTHEAST (OUTPATIENT)
Dept: PHYSICAL THERAPY | Facility: REHABILITATION | Age: 78
End: 2018-09-10

## 2018-09-10 DIAGNOSIS — G89.4 CHRONIC PAIN SYNDROME: ICD-10-CM

## 2018-09-10 DIAGNOSIS — M54.16 LUMBAR RADICULOPATHY: ICD-10-CM

## 2018-09-10 DIAGNOSIS — M48.07 STENOSIS OF LATERAL RECESS OF LUMBOSACRAL SPINE: ICD-10-CM

## 2018-09-10 DIAGNOSIS — M54.12 CERVICAL RADICULITIS: ICD-10-CM

## 2018-09-10 DIAGNOSIS — M79.18 DIFFUSE MYOFASCIAL PAIN SYNDROME: ICD-10-CM

## 2018-09-10 DIAGNOSIS — M54.50 ACUTE EXACERBATION OF CHRONIC LOW BACK PAIN: ICD-10-CM

## 2018-09-10 DIAGNOSIS — M26.629 TEMPOROMANDIBULAR JOINT-PAIN-DYSFUNCTION SYNDROME (TMJ): ICD-10-CM

## 2018-09-10 DIAGNOSIS — M54.6 THORACIC SPINE PAIN: ICD-10-CM

## 2018-09-10 DIAGNOSIS — G89.29 ACUTE EXACERBATION OF CHRONIC LOW BACK PAIN: ICD-10-CM

## 2018-09-10 DIAGNOSIS — G90.523 COMPLEX REGIONAL PAIN SYNDROME TYPE 1 OF BOTH LOWER EXTREMITIES: ICD-10-CM

## 2018-09-10 DIAGNOSIS — R10.32 LEFT LOWER QUADRANT PAIN: ICD-10-CM

## 2018-09-11 ENCOUNTER — OFFICE VISIT - HEALTHEAST (OUTPATIENT)
Dept: PHYSICAL MEDICINE AND REHAB | Facility: REHABILITATION | Age: 78
End: 2018-09-11

## 2018-09-11 ENCOUNTER — OFFICE VISIT - HEALTHEAST (OUTPATIENT)
Dept: PHYSICAL THERAPY | Facility: REHABILITATION | Age: 78
End: 2018-09-11

## 2018-09-11 DIAGNOSIS — M99.05 SOMATIC DYSFUNCTION OF PELVIS REGION: ICD-10-CM

## 2018-09-11 DIAGNOSIS — M54.16 LUMBAR RADICULOPATHY: ICD-10-CM

## 2018-09-11 DIAGNOSIS — M79.18 DIFFUSE MYOFASCIAL PAIN SYNDROME: ICD-10-CM

## 2018-09-11 DIAGNOSIS — M99.03 SOMATIC DYSFUNCTION OF LUMBAR REGION: ICD-10-CM

## 2018-09-11 DIAGNOSIS — M99.06 SOMATIC DYSFUNCTION OF LOWER EXTREMITY: ICD-10-CM

## 2018-09-11 DIAGNOSIS — M99.08 SOMATIC DYSFUNCTION OF RIB REGION: ICD-10-CM

## 2018-09-11 DIAGNOSIS — M99.00 SOMATIC DYSFUNCTION OF HEAD REGION: ICD-10-CM

## 2018-09-11 DIAGNOSIS — G89.4 CHRONIC PAIN SYNDROME: ICD-10-CM

## 2018-09-11 DIAGNOSIS — M99.07 SOMATIC DYSFUNCTION OF UPPER EXTREMITY: ICD-10-CM

## 2018-09-11 DIAGNOSIS — G90.523 COMPLEX REGIONAL PAIN SYNDROME TYPE 1 OF BOTH LOWER EXTREMITIES: ICD-10-CM

## 2018-09-11 DIAGNOSIS — M99.04 SOMATIC DYSFUNCTION OF SACROILIAC JOINT: ICD-10-CM

## 2018-09-11 DIAGNOSIS — M99.01 SOMATIC DYSFUNCTION OF CERVICAL REGION: ICD-10-CM

## 2018-09-11 DIAGNOSIS — M54.6 THORACIC SPINE PAIN: ICD-10-CM

## 2018-09-11 DIAGNOSIS — M54.12 CERVICAL RADICULITIS: ICD-10-CM

## 2018-09-11 DIAGNOSIS — M99.02 SOMATIC DYSFUNCTION OF THORACIC REGION: ICD-10-CM

## 2018-09-11 DIAGNOSIS — M54.2 CERVICALGIA: ICD-10-CM

## 2018-09-11 DIAGNOSIS — M79.18 MYOFASCIAL PAIN: ICD-10-CM

## 2018-09-11 DIAGNOSIS — M54.6 PAIN IN THORACIC SPINE: ICD-10-CM

## 2018-09-11 DIAGNOSIS — G89.29 CHRONIC PAIN: ICD-10-CM

## 2018-09-11 ASSESSMENT — MIFFLIN-ST. JEOR: SCORE: 1113.65

## 2018-09-24 ENCOUNTER — OFFICE VISIT - HEALTHEAST (OUTPATIENT)
Dept: PHYSICAL THERAPY | Facility: REHABILITATION | Age: 78
End: 2018-09-24

## 2018-09-24 ENCOUNTER — AMBULATORY - HEALTHEAST (OUTPATIENT)
Dept: LAB | Facility: CLINIC | Age: 78
End: 2018-09-24

## 2018-09-24 ENCOUNTER — COMMUNICATION - HEALTHEAST (OUTPATIENT)
Dept: LAB | Facility: CLINIC | Age: 78
End: 2018-09-24

## 2018-09-24 DIAGNOSIS — R30.0 DYSURIA: ICD-10-CM

## 2018-09-24 DIAGNOSIS — E78.2 MIXED HYPERLIPIDEMIA: ICD-10-CM

## 2018-09-24 DIAGNOSIS — M54.50 ACUTE EXACERBATION OF CHRONIC LOW BACK PAIN: ICD-10-CM

## 2018-09-24 DIAGNOSIS — M79.18 DIFFUSE MYOFASCIAL PAIN SYNDROME: ICD-10-CM

## 2018-09-24 DIAGNOSIS — M54.16 LUMBAR RADICULOPATHY: ICD-10-CM

## 2018-09-24 DIAGNOSIS — M54.50 ACUTE RIGHT-SIDED LOW BACK PAIN WITHOUT SCIATICA: ICD-10-CM

## 2018-09-24 DIAGNOSIS — K83.4 SPHINCTER OF ODDI DYSFUNCTION: ICD-10-CM

## 2018-09-24 DIAGNOSIS — G89.4 CHRONIC PAIN SYNDROME: ICD-10-CM

## 2018-09-24 DIAGNOSIS — G89.29 ACUTE EXACERBATION OF CHRONIC LOW BACK PAIN: ICD-10-CM

## 2018-09-24 DIAGNOSIS — R10.32 LEFT LOWER QUADRANT PAIN: ICD-10-CM

## 2018-09-24 DIAGNOSIS — M54.6 THORACIC SPINE PAIN: ICD-10-CM

## 2018-09-24 DIAGNOSIS — G90.523 COMPLEX REGIONAL PAIN SYNDROME TYPE 1 OF BOTH LOWER EXTREMITIES: ICD-10-CM

## 2018-09-24 DIAGNOSIS — R19.7 DIARRHEA: ICD-10-CM

## 2018-09-24 DIAGNOSIS — M48.07 STENOSIS OF LATERAL RECESS OF LUMBOSACRAL SPINE: ICD-10-CM

## 2018-09-24 DIAGNOSIS — M54.12 CERVICAL RADICULITIS: ICD-10-CM

## 2018-09-24 DIAGNOSIS — M54.50 LEFT-SIDED LOW BACK PAIN WITHOUT SCIATICA, UNSPECIFIED CHRONICITY: ICD-10-CM

## 2018-09-24 LAB
ALBUMIN SERPL-MCNC: 4.1 G/DL (ref 3.5–5)
ALBUMIN UR-MCNC: ABNORMAL MG/DL
ALP SERPL-CCNC: 47 U/L (ref 45–120)
ALT SERPL W P-5'-P-CCNC: 16 U/L (ref 0–45)
ANION GAP SERPL CALCULATED.3IONS-SCNC: 11 MMOL/L (ref 5–18)
APPEARANCE UR: CLEAR
AST SERPL W P-5'-P-CCNC: 22 U/L (ref 0–40)
BASOPHILS # BLD AUTO: 0 THOU/UL (ref 0–0.2)
BASOPHILS NFR BLD AUTO: 1 % (ref 0–2)
BILIRUB SERPL-MCNC: 1 MG/DL (ref 0–1)
BILIRUB UR QL STRIP: ABNORMAL
BUN SERPL-MCNC: 13 MG/DL (ref 8–28)
CALCIUM SERPL-MCNC: 8.8 MG/DL (ref 8.5–10.5)
CHLORIDE BLD-SCNC: 109 MMOL/L (ref 98–107)
CHOLEST SERPL-MCNC: 187 MG/DL
CO2 SERPL-SCNC: 19 MMOL/L (ref 22–31)
COLOR UR AUTO: YELLOW
CREAT SERPL-MCNC: 0.96 MG/DL (ref 0.6–1.1)
EOSINOPHIL # BLD AUTO: 0.1 THOU/UL (ref 0–0.4)
EOSINOPHIL NFR BLD AUTO: 3 % (ref 0–6)
ERYTHROCYTE [DISTWIDTH] IN BLOOD BY AUTOMATED COUNT: 13.5 % (ref 11–14.5)
FASTING STATUS PATIENT QL REPORTED: YES
GFR SERPL CREATININE-BSD FRML MDRD: 57 ML/MIN/1.73M2
GLUCOSE BLD-MCNC: 97 MG/DL (ref 70–125)
GLUCOSE UR STRIP-MCNC: NEGATIVE MG/DL
HCT VFR BLD AUTO: 38.7 % (ref 35–47)
HDLC SERPL-MCNC: 65 MG/DL
HGB BLD-MCNC: 13.2 G/DL (ref 12–16)
HGB UR QL STRIP: ABNORMAL
KETONES UR STRIP-MCNC: NEGATIVE MG/DL
LDLC SERPL CALC-MCNC: 105 MG/DL
LEUKOCYTE ESTERASE UR QL STRIP: NEGATIVE
LIPASE SERPL-CCNC: 36 U/L (ref 0–52)
LYMPHOCYTES # BLD AUTO: 1.4 THOU/UL (ref 0.8–4.4)
LYMPHOCYTES NFR BLD AUTO: 34 % (ref 20–40)
MAGNESIUM SERPL-MCNC: 2.6 MG/DL (ref 1.8–2.6)
MCH RBC QN AUTO: 32.2 PG (ref 27–34)
MCHC RBC AUTO-ENTMCNC: 34.1 G/DL (ref 32–36)
MCV RBC AUTO: 94 FL (ref 80–100)
MONOCYTES # BLD AUTO: 0.4 THOU/UL (ref 0–0.9)
MONOCYTES NFR BLD AUTO: 10 % (ref 2–10)
NEUTROPHILS # BLD AUTO: 2.2 THOU/UL (ref 2–7.7)
NEUTROPHILS NFR BLD AUTO: 52 % (ref 50–70)
NITRATE UR QL: NEGATIVE
PH UR STRIP: 5.5 [PH] (ref 5–8)
PLATELET # BLD AUTO: 186 THOU/UL (ref 140–440)
PMV BLD AUTO: 7.2 FL (ref 7–10)
POTASSIUM BLD-SCNC: 3.9 MMOL/L (ref 3.5–5)
PROT SERPL-MCNC: 6.7 G/DL (ref 6–8)
RBC # BLD AUTO: 4.1 MILL/UL (ref 3.8–5.4)
SODIUM SERPL-SCNC: 139 MMOL/L (ref 136–145)
SP GR UR STRIP: >=1.03 (ref 1–1.03)
TRIGL SERPL-MCNC: 87 MG/DL
UROBILINOGEN UR STRIP-ACNC: ABNORMAL
WBC: 4.2 THOU/UL (ref 4–11)

## 2018-09-25 ENCOUNTER — OFFICE VISIT - HEALTHEAST (OUTPATIENT)
Dept: CARDIOLOGY | Facility: CLINIC | Age: 78
End: 2018-09-25

## 2018-09-25 ENCOUNTER — OFFICE VISIT - HEALTHEAST (OUTPATIENT)
Dept: PHYSICAL THERAPY | Facility: REHABILITATION | Age: 78
End: 2018-09-25

## 2018-09-25 DIAGNOSIS — I10 ESSENTIAL HYPERTENSION: ICD-10-CM

## 2018-09-25 DIAGNOSIS — R00.1 SINUS BRADYCARDIA: ICD-10-CM

## 2018-09-25 DIAGNOSIS — E78.2 MIXED HYPERLIPIDEMIA: ICD-10-CM

## 2018-09-25 DIAGNOSIS — M54.12 CERVICAL RADICULITIS: ICD-10-CM

## 2018-09-25 DIAGNOSIS — M54.6 THORACIC SPINE PAIN: ICD-10-CM

## 2018-09-25 DIAGNOSIS — M79.18 DIFFUSE MYOFASCIAL PAIN SYNDROME: ICD-10-CM

## 2018-09-25 DIAGNOSIS — M54.16 LUMBAR RADICULOPATHY: ICD-10-CM

## 2018-09-25 DIAGNOSIS — I65.23 BILATERAL CAROTID ARTERY STENOSIS: ICD-10-CM

## 2018-09-25 DIAGNOSIS — I25.10 CORONARY ARTERY DISEASE INVOLVING NATIVE CORONARY ARTERY OF NATIVE HEART WITHOUT ANGINA PECTORIS: ICD-10-CM

## 2018-09-25 DIAGNOSIS — G89.4 CHRONIC PAIN SYNDROME: ICD-10-CM

## 2018-09-25 LAB — BACTERIA SPEC CULT: NO GROWTH

## 2018-09-25 ASSESSMENT — MIFFLIN-ST. JEOR: SCORE: 1100.04

## 2018-09-26 ENCOUNTER — HOSPITAL ENCOUNTER (OUTPATIENT)
Dept: PALLIATIVE MEDICINE | Facility: OTHER | Age: 78
Discharge: HOME | End: 2018-09-26
Attending: ANESTHESIOLOGY

## 2018-09-26 ENCOUNTER — HOSPITAL ENCOUNTER (OUTPATIENT)
Dept: PALLIATIVE MEDICINE | Facility: OTHER | Age: 78
Discharge: HOME | End: 2018-09-26
Attending: ANESTHESIOLOGY | Admitting: PAIN MEDICINE

## 2018-09-26 DIAGNOSIS — F41.1 GENERALIZED ANXIETY DISORDER: ICD-10-CM

## 2018-09-26 DIAGNOSIS — G89.4 CHRONIC PAIN SYNDROME: ICD-10-CM

## 2018-09-26 DIAGNOSIS — M48.07 STENOSIS OF LATERAL RECESS OF LUMBOSACRAL SPINE: ICD-10-CM

## 2018-09-26 DIAGNOSIS — F33.2 SEVERE RECURRENT MAJOR DEPRESSION WITHOUT PSYCHOTIC FEATURES (H): ICD-10-CM

## 2018-09-26 ASSESSMENT — MIFFLIN-ST. JEOR: SCORE: 1100.04

## 2018-09-27 ENCOUNTER — COMMUNICATION - HEALTHEAST (OUTPATIENT)
Dept: FAMILY MEDICINE | Facility: CLINIC | Age: 78
End: 2018-09-27

## 2018-09-27 ENCOUNTER — COMMUNICATION - HEALTHEAST (OUTPATIENT)
Dept: PALLIATIVE MEDICINE | Facility: OTHER | Age: 78
End: 2018-09-27

## 2018-09-27 ENCOUNTER — COMMUNICATION - HEALTHEAST (OUTPATIENT)
Dept: PALLIATIVE MEDICINE | Facility: CLINIC | Age: 78
End: 2018-09-27

## 2018-10-01 ENCOUNTER — COMMUNICATION - HEALTHEAST (OUTPATIENT)
Dept: FAMILY MEDICINE | Facility: CLINIC | Age: 78
End: 2018-10-01

## 2018-10-01 ENCOUNTER — HOSPITAL ENCOUNTER (OUTPATIENT)
Dept: MAMMOGRAPHY | Facility: CLINIC | Age: 78
Discharge: HOME | End: 2018-10-01
Attending: FAMILY MEDICINE

## 2018-10-01 DIAGNOSIS — Z12.31 VISIT FOR SCREENING MAMMOGRAM: ICD-10-CM

## 2018-10-02 ENCOUNTER — OFFICE VISIT - HEALTHEAST (OUTPATIENT)
Dept: PHYSICAL THERAPY | Facility: REHABILITATION | Age: 78
End: 2018-10-02

## 2018-10-02 DIAGNOSIS — M79.18 DIFFUSE MYOFASCIAL PAIN SYNDROME: ICD-10-CM

## 2018-10-02 DIAGNOSIS — G89.4 CHRONIC PAIN SYNDROME: ICD-10-CM

## 2018-10-02 DIAGNOSIS — M54.6 THORACIC SPINE PAIN: ICD-10-CM

## 2018-10-02 DIAGNOSIS — M54.16 LUMBAR RADICULOPATHY: ICD-10-CM

## 2018-10-02 DIAGNOSIS — M54.12 CERVICAL RADICULITIS: ICD-10-CM

## 2018-10-03 ENCOUNTER — OFFICE VISIT - HEALTHEAST (OUTPATIENT)
Dept: FAMILY MEDICINE | Facility: CLINIC | Age: 78
End: 2018-10-03

## 2018-10-03 DIAGNOSIS — B02.9 SHINGLES: ICD-10-CM

## 2018-10-03 ASSESSMENT — MIFFLIN-ST. JEOR: SCORE: 1097.2

## 2018-10-09 ENCOUNTER — OFFICE VISIT - HEALTHEAST (OUTPATIENT)
Dept: PHYSICAL MEDICINE AND REHAB | Facility: REHABILITATION | Age: 78
End: 2018-10-09

## 2018-10-09 ENCOUNTER — OFFICE VISIT - HEALTHEAST (OUTPATIENT)
Dept: PHYSICAL THERAPY | Facility: REHABILITATION | Age: 78
End: 2018-10-09

## 2018-10-09 DIAGNOSIS — M99.00 SOMATIC DYSFUNCTION OF HEAD REGION: ICD-10-CM

## 2018-10-09 DIAGNOSIS — M54.6 THORACIC SPINE PAIN: ICD-10-CM

## 2018-10-09 DIAGNOSIS — M99.01 SOMATIC DYSFUNCTION OF CERVICAL REGION: ICD-10-CM

## 2018-10-09 DIAGNOSIS — G89.4 CHRONIC PAIN SYNDROME: ICD-10-CM

## 2018-10-09 DIAGNOSIS — M79.18 MYOFASCIAL PAIN: ICD-10-CM

## 2018-10-09 DIAGNOSIS — M99.06 SOMATIC DYSFUNCTION OF LOWER EXTREMITY: ICD-10-CM

## 2018-10-09 DIAGNOSIS — M54.16 LUMBAR RADICULOPATHY: ICD-10-CM

## 2018-10-09 DIAGNOSIS — M99.04 SOMATIC DYSFUNCTION OF SACROILIAC JOINT: ICD-10-CM

## 2018-10-09 DIAGNOSIS — M99.03 SOMATIC DYSFUNCTION OF LUMBAR REGION: ICD-10-CM

## 2018-10-09 DIAGNOSIS — M54.12 CERVICAL RADICULITIS: ICD-10-CM

## 2018-10-09 DIAGNOSIS — M99.05 SOMATIC DYSFUNCTION OF PELVIS REGION: ICD-10-CM

## 2018-10-09 DIAGNOSIS — M99.02 SOMATIC DYSFUNCTION OF THORACIC REGION: ICD-10-CM

## 2018-10-09 DIAGNOSIS — M99.07 SOMATIC DYSFUNCTION OF UPPER EXTREMITY: ICD-10-CM

## 2018-10-09 DIAGNOSIS — B02.9 SHINGLES: ICD-10-CM

## 2018-10-09 DIAGNOSIS — M79.18 DIFFUSE MYOFASCIAL PAIN SYNDROME: ICD-10-CM

## 2018-10-09 DIAGNOSIS — G89.29 CHRONIC PAIN: ICD-10-CM

## 2018-10-09 DIAGNOSIS — M54.50 LEFT-SIDED LOW BACK PAIN WITHOUT SCIATICA, UNSPECIFIED CHRONICITY: ICD-10-CM

## 2018-10-09 DIAGNOSIS — M99.08 SOMATIC DYSFUNCTION OF RIB REGION: ICD-10-CM

## 2018-10-09 ASSESSMENT — MIFFLIN-ST. JEOR: SCORE: 1100.04

## 2018-10-15 ENCOUNTER — OFFICE VISIT - HEALTHEAST (OUTPATIENT)
Dept: FAMILY MEDICINE | Facility: CLINIC | Age: 78
End: 2018-10-15

## 2018-10-15 DIAGNOSIS — B02.9 SHINGLES: ICD-10-CM

## 2018-10-15 DIAGNOSIS — R19.7 DIARRHEA: ICD-10-CM

## 2018-10-15 DIAGNOSIS — R10.12 LUQ ABDOMINAL PAIN: ICD-10-CM

## 2018-10-15 DIAGNOSIS — R74.8 ELEVATED LIPASE: ICD-10-CM

## 2018-10-15 DIAGNOSIS — F32.A ANXIETY AND DEPRESSION: ICD-10-CM

## 2018-10-15 DIAGNOSIS — F41.9 ANXIETY AND DEPRESSION: ICD-10-CM

## 2018-10-15 LAB
C DIFF TOX B STL QL: NEGATIVE
RIBOTYPE 027/NAP1/BI: NORMAL

## 2018-10-16 ENCOUNTER — COMMUNICATION - HEALTHEAST (OUTPATIENT)
Dept: FAMILY MEDICINE | Facility: CLINIC | Age: 78
End: 2018-10-16

## 2018-10-16 ENCOUNTER — HOSPITAL ENCOUNTER (OUTPATIENT)
Dept: PALLIATIVE MEDICINE | Facility: OTHER | Age: 78
Discharge: HOME | End: 2018-10-16
Attending: ANESTHESIOLOGY

## 2018-10-16 ENCOUNTER — HOSPITAL ENCOUNTER (OUTPATIENT)
Dept: PALLIATIVE MEDICINE | Facility: OTHER | Age: 78
Discharge: HOME | End: 2018-10-16
Attending: SOCIAL WORKER

## 2018-10-16 DIAGNOSIS — F32.A ANXIETY AND DEPRESSION: ICD-10-CM

## 2018-10-16 DIAGNOSIS — F33.2 SEVERE RECURRENT MAJOR DEPRESSION WITHOUT PSYCHOTIC FEATURES (H): ICD-10-CM

## 2018-10-16 DIAGNOSIS — R10.12 LUQ ABDOMINAL PAIN: ICD-10-CM

## 2018-10-16 DIAGNOSIS — R19.7 DIARRHEA: ICD-10-CM

## 2018-10-16 DIAGNOSIS — F41.1 GENERALIZED ANXIETY DISORDER: ICD-10-CM

## 2018-10-16 DIAGNOSIS — G89.4 CHRONIC PAIN SYNDROME: ICD-10-CM

## 2018-10-16 DIAGNOSIS — R52 PAIN: ICD-10-CM

## 2018-10-16 DIAGNOSIS — F41.9 ANXIETY AND DEPRESSION: ICD-10-CM

## 2018-10-16 DIAGNOSIS — R74.8 ELEVATED LIPASE: ICD-10-CM

## 2018-10-16 DIAGNOSIS — B02.9 SHINGLES: ICD-10-CM

## 2018-10-16 DIAGNOSIS — B02.8 ZOSTER WITH OTHER COMPLICATIONS: ICD-10-CM

## 2018-10-16 ASSESSMENT — MIFFLIN-ST. JEOR: SCORE: 1079.17

## 2018-10-17 ENCOUNTER — RECORDS - HEALTHEAST (OUTPATIENT)
Dept: ADMINISTRATIVE | Facility: OTHER | Age: 78
End: 2018-10-17

## 2018-10-18 ENCOUNTER — AMBULATORY - HEALTHEAST (OUTPATIENT)
Dept: PALLIATIVE MEDICINE | Facility: OTHER | Age: 78
End: 2018-10-18

## 2018-10-18 ENCOUNTER — HOSPITAL ENCOUNTER (OUTPATIENT)
Dept: PALLIATIVE MEDICINE | Facility: OTHER | Age: 78
Discharge: HOME | End: 2018-10-18
Attending: SOCIAL WORKER

## 2018-10-18 ENCOUNTER — RECORDS - HEALTHEAST (OUTPATIENT)
Dept: ADMINISTRATIVE | Facility: OTHER | Age: 78
End: 2018-10-18

## 2018-10-18 ENCOUNTER — OFFICE VISIT - HEALTHEAST (OUTPATIENT)
Dept: PHYSICAL THERAPY | Facility: REHABILITATION | Age: 78
End: 2018-10-18

## 2018-10-18 DIAGNOSIS — B02.9 SHINGLES: ICD-10-CM

## 2018-10-18 DIAGNOSIS — M54.50 LEFT-SIDED LOW BACK PAIN WITHOUT SCIATICA, UNSPECIFIED CHRONICITY: ICD-10-CM

## 2018-10-18 DIAGNOSIS — M48.07 STENOSIS OF LATERAL RECESS OF LUMBOSACRAL SPINE: ICD-10-CM

## 2018-10-18 DIAGNOSIS — G89.29 ACUTE EXACERBATION OF CHRONIC LOW BACK PAIN: ICD-10-CM

## 2018-10-18 DIAGNOSIS — G89.4 CHRONIC PAIN SYNDROME: ICD-10-CM

## 2018-10-18 DIAGNOSIS — M54.12 CERVICAL RADICULITIS: ICD-10-CM

## 2018-10-18 DIAGNOSIS — M54.50 ACUTE EXACERBATION OF CHRONIC LOW BACK PAIN: ICD-10-CM

## 2018-10-18 DIAGNOSIS — B02.8 HERPES ZOSTER WITH COMPLICATION: ICD-10-CM

## 2018-10-18 DIAGNOSIS — R10.32 LEFT LOWER QUADRANT PAIN: ICD-10-CM

## 2018-10-18 DIAGNOSIS — R22.40 LOCALIZED SWELLING OF LOWER LEG: ICD-10-CM

## 2018-10-18 DIAGNOSIS — F41.1 GENERALIZED ANXIETY DISORDER: ICD-10-CM

## 2018-10-18 DIAGNOSIS — M54.6 THORACIC SPINE PAIN: ICD-10-CM

## 2018-10-18 DIAGNOSIS — G90.523 COMPLEX REGIONAL PAIN SYNDROME TYPE 1 OF BOTH LOWER EXTREMITIES: ICD-10-CM

## 2018-10-18 DIAGNOSIS — F33.2 SEVERE RECURRENT MAJOR DEPRESSION WITHOUT PSYCHOTIC FEATURES (H): ICD-10-CM

## 2018-10-18 LAB
ARUP MISCELLANEOUS TEST: NORMAL
MISCELLANEOUS TEST DEPT. - HE HISTORICAL: NORMAL
PERFORMING LAB: NORMAL
SPECIMEN STATUS: NORMAL
TEST NAME: NORMAL

## 2018-10-19 ENCOUNTER — COMMUNICATION - HEALTHEAST (OUTPATIENT)
Dept: PALLIATIVE MEDICINE | Facility: OTHER | Age: 78
End: 2018-10-19

## 2018-10-19 ENCOUNTER — COMMUNICATION - HEALTHEAST (OUTPATIENT)
Dept: FAMILY MEDICINE | Facility: CLINIC | Age: 78
End: 2018-10-19

## 2018-10-19 DIAGNOSIS — R52 PAIN: ICD-10-CM

## 2018-10-22 ENCOUNTER — OFFICE VISIT - HEALTHEAST (OUTPATIENT)
Dept: PHYSICAL THERAPY | Facility: REHABILITATION | Age: 78
End: 2018-10-22

## 2018-10-22 DIAGNOSIS — R10.32 LEFT LOWER QUADRANT PAIN: ICD-10-CM

## 2018-10-22 DIAGNOSIS — G90.50 REFLEX SYMPATHETIC DYSTROPHY: ICD-10-CM

## 2018-10-22 DIAGNOSIS — M48.07 STENOSIS OF LATERAL RECESS OF LUMBOSACRAL SPINE: ICD-10-CM

## 2018-10-22 DIAGNOSIS — G90.523 COMPLEX REGIONAL PAIN SYNDROME TYPE 1 OF BOTH LOWER EXTREMITIES: ICD-10-CM

## 2018-10-22 DIAGNOSIS — G89.4 CHRONIC PAIN SYNDROME: ICD-10-CM

## 2018-10-22 DIAGNOSIS — M54.50 LEFT-SIDED LOW BACK PAIN WITHOUT SCIATICA, UNSPECIFIED CHRONICITY: ICD-10-CM

## 2018-10-22 DIAGNOSIS — M54.6 THORACIC SPINE PAIN: ICD-10-CM

## 2018-10-22 DIAGNOSIS — M54.16 LUMBAR RADICULOPATHY: ICD-10-CM

## 2018-10-22 DIAGNOSIS — M54.12 CERVICAL RADICULITIS: ICD-10-CM

## 2018-10-24 ENCOUNTER — HOSPITAL ENCOUNTER (OUTPATIENT)
Dept: PALLIATIVE MEDICINE | Facility: OTHER | Age: 78
Discharge: HOME | End: 2018-10-24
Attending: ANESTHESIOLOGY | Admitting: PAIN MEDICINE

## 2018-10-24 DIAGNOSIS — B02.8 ZOSTER WITH OTHER COMPLICATIONS: ICD-10-CM

## 2018-10-24 DIAGNOSIS — R52 PAIN: ICD-10-CM

## 2018-10-24 ASSESSMENT — MIFFLIN-ST. JEOR: SCORE: 1077.36

## 2018-10-25 ENCOUNTER — COMMUNICATION - HEALTHEAST (OUTPATIENT)
Dept: PALLIATIVE MEDICINE | Facility: OTHER | Age: 78
End: 2018-10-25

## 2018-10-29 ENCOUNTER — COMMUNICATION - HEALTHEAST (OUTPATIENT)
Dept: FAMILY MEDICINE | Facility: CLINIC | Age: 78
End: 2018-10-29

## 2018-10-29 ENCOUNTER — OFFICE VISIT - HEALTHEAST (OUTPATIENT)
Dept: FAMILY MEDICINE | Facility: CLINIC | Age: 78
End: 2018-10-29

## 2018-10-29 DIAGNOSIS — Z86.19 HISTORY OF SHINGLES: ICD-10-CM

## 2018-10-29 DIAGNOSIS — I10 ESSENTIAL HYPERTENSION: ICD-10-CM

## 2018-10-29 DIAGNOSIS — R00.1 SINUS BRADYCARDIA: ICD-10-CM

## 2018-10-29 DIAGNOSIS — F32.A ANXIETY AND DEPRESSION: ICD-10-CM

## 2018-10-29 DIAGNOSIS — F41.9 ANXIETY AND DEPRESSION: ICD-10-CM

## 2018-10-30 ENCOUNTER — OFFICE VISIT - HEALTHEAST (OUTPATIENT)
Dept: PHYSICAL THERAPY | Facility: REHABILITATION | Age: 78
End: 2018-10-30

## 2018-10-30 DIAGNOSIS — R10.32 LEFT LOWER QUADRANT PAIN: ICD-10-CM

## 2018-10-30 DIAGNOSIS — M54.6 THORACIC SPINE PAIN: ICD-10-CM

## 2018-10-30 DIAGNOSIS — G89.4 CHRONIC PAIN SYNDROME: ICD-10-CM

## 2018-10-30 DIAGNOSIS — M54.12 CERVICAL RADICULITIS: ICD-10-CM

## 2018-10-30 DIAGNOSIS — M54.50 LEFT-SIDED LOW BACK PAIN WITHOUT SCIATICA, UNSPECIFIED CHRONICITY: ICD-10-CM

## 2018-10-30 DIAGNOSIS — G90.523 COMPLEX REGIONAL PAIN SYNDROME TYPE 1 OF BOTH LOWER EXTREMITIES: ICD-10-CM

## 2018-10-31 ENCOUNTER — COMMUNICATION - HEALTHEAST (OUTPATIENT)
Dept: SCHEDULING | Facility: CLINIC | Age: 78
End: 2018-10-31

## 2018-11-01 ENCOUNTER — COMMUNICATION - HEALTHEAST (OUTPATIENT)
Dept: FAMILY MEDICINE | Facility: CLINIC | Age: 78
End: 2018-11-01

## 2018-11-01 DIAGNOSIS — B02.9 SHINGLES: ICD-10-CM

## 2018-11-01 DIAGNOSIS — R19.7 DIARRHEA: ICD-10-CM

## 2018-11-01 DIAGNOSIS — R10.12 LUQ ABDOMINAL PAIN: ICD-10-CM

## 2018-11-01 DIAGNOSIS — F41.9 ANXIETY AND DEPRESSION: ICD-10-CM

## 2018-11-01 DIAGNOSIS — F32.A ANXIETY AND DEPRESSION: ICD-10-CM

## 2018-11-01 DIAGNOSIS — R74.8 ELEVATED LIPASE: ICD-10-CM

## 2018-11-07 ENCOUNTER — OFFICE VISIT - HEALTHEAST (OUTPATIENT)
Dept: PHYSICAL THERAPY | Facility: REHABILITATION | Age: 78
End: 2018-11-07

## 2018-11-07 DIAGNOSIS — R10.32 LEFT LOWER QUADRANT PAIN: ICD-10-CM

## 2018-11-07 DIAGNOSIS — G90.523 COMPLEX REGIONAL PAIN SYNDROME TYPE 1 OF BOTH LOWER EXTREMITIES: ICD-10-CM

## 2018-11-07 DIAGNOSIS — M54.50 LEFT-SIDED LOW BACK PAIN WITHOUT SCIATICA, UNSPECIFIED CHRONICITY: ICD-10-CM

## 2018-11-07 DIAGNOSIS — G89.4 CHRONIC PAIN SYNDROME: ICD-10-CM

## 2018-11-07 DIAGNOSIS — M54.6 THORACIC SPINE PAIN: ICD-10-CM

## 2018-11-07 DIAGNOSIS — M54.12 CERVICAL RADICULITIS: ICD-10-CM

## 2018-11-10 ENCOUNTER — RECORDS - HEALTHEAST (OUTPATIENT)
Dept: ADMINISTRATIVE | Facility: OTHER | Age: 78
End: 2018-11-10

## 2018-11-13 ENCOUNTER — OFFICE VISIT - HEALTHEAST (OUTPATIENT)
Dept: PHYSICAL MEDICINE AND REHAB | Facility: REHABILITATION | Age: 78
End: 2018-11-13

## 2018-11-13 DIAGNOSIS — M79.18 MYOFASCIAL PAIN: ICD-10-CM

## 2018-11-13 DIAGNOSIS — M99.08 SOMATIC DYSFUNCTION OF RIB REGION: ICD-10-CM

## 2018-11-13 DIAGNOSIS — M99.03 SOMATIC DYSFUNCTION OF LUMBAR REGION: ICD-10-CM

## 2018-11-13 DIAGNOSIS — M99.06 SOMATIC DYSFUNCTION OF LOWER EXTREMITY: ICD-10-CM

## 2018-11-13 DIAGNOSIS — M99.02 SOMATIC DYSFUNCTION OF THORACIC REGION: ICD-10-CM

## 2018-11-13 DIAGNOSIS — M99.00 SOMATIC DYSFUNCTION OF HEAD REGION: ICD-10-CM

## 2018-11-13 DIAGNOSIS — M99.07 SOMATIC DYSFUNCTION OF UPPER EXTREMITY: ICD-10-CM

## 2018-11-13 DIAGNOSIS — M25.572 PAIN IN JOINT, ANKLE AND FOOT, LEFT: ICD-10-CM

## 2018-11-13 DIAGNOSIS — M99.04 SOMATIC DYSFUNCTION OF SACROILIAC JOINT: ICD-10-CM

## 2018-11-13 DIAGNOSIS — G89.29 OTHER CHRONIC PAIN: ICD-10-CM

## 2018-11-13 DIAGNOSIS — M99.05 SOMATIC DYSFUNCTION OF PELVIS REGION: ICD-10-CM

## 2018-11-13 DIAGNOSIS — M99.01 SOMATIC DYSFUNCTION OF CERVICAL REGION: ICD-10-CM

## 2018-11-13 ASSESSMENT — MIFFLIN-ST. JEOR: SCORE: 1090.97

## 2018-11-20 ENCOUNTER — RECORDS - HEALTHEAST (OUTPATIENT)
Dept: GENERAL RADIOLOGY | Facility: CLINIC | Age: 78
End: 2018-11-20

## 2018-11-20 ENCOUNTER — OFFICE VISIT - HEALTHEAST (OUTPATIENT)
Dept: FAMILY MEDICINE | Facility: CLINIC | Age: 78
End: 2018-11-20

## 2018-11-20 DIAGNOSIS — I10 ESSENTIAL HYPERTENSION: ICD-10-CM

## 2018-11-20 DIAGNOSIS — F41.9 ANXIETY AND DEPRESSION: ICD-10-CM
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
## 2018-11-20 DIAGNOSIS — R19.7 DIARRHEA, UNSPECIFIED: ICD-10-CM

## 2018-11-20 DIAGNOSIS — R19.7 DIARRHEA: ICD-10-CM

## 2018-11-20 DIAGNOSIS — R10.12 LUQ ABDOMINAL PAIN: ICD-10-CM

## 2018-11-20 DIAGNOSIS — F32.A ANXIETY AND DEPRESSION: ICD-10-CM

## 2018-11-20 DIAGNOSIS — G44.009 CLUSTER HEADACHES: ICD-10-CM

## 2018-11-20 DIAGNOSIS — E03.9 ACQUIRED HYPOTHYROIDISM: ICD-10-CM

## 2018-11-20 LAB
ALBUMIN SERPL-MCNC: 3.5 G/DL (ref 3.5–5)
ALP SERPL-CCNC: 53 U/L (ref 45–120)
ALT SERPL W P-5'-P-CCNC: 19 U/L (ref 0–45)
ANION GAP SERPL CALCULATED.3IONS-SCNC: 9 MMOL/L (ref 5–18)
AST SERPL W P-5'-P-CCNC: 42 U/L (ref 0–40)
BILIRUB SERPL-MCNC: 0.6 MG/DL (ref 0–1)
BUN SERPL-MCNC: 14 MG/DL (ref 8–28)
CALCIUM SERPL-MCNC: 8.5 MG/DL (ref 8.5–10.5)
CHLORIDE BLD-SCNC: 107 MMOL/L (ref 98–107)
CO2 SERPL-SCNC: 22 MMOL/L (ref 22–31)
CREAT SERPL-MCNC: 0.85 MG/DL (ref 0.6–1.1)
ERYTHROCYTE [DISTWIDTH] IN BLOOD BY AUTOMATED COUNT: 12.1 % (ref 11–14.5)
GFR SERPL CREATININE-BSD FRML MDRD: >60 ML/MIN/1.73M2
GLUCOSE BLD-MCNC: 142 MG/DL (ref 70–125)
HCT VFR BLD AUTO: 35 % (ref 35–47)
HGB BLD-MCNC: 11.8 G/DL (ref 12–16)
MCH RBC QN AUTO: 32.8 PG (ref 27–34)
MCHC RBC AUTO-ENTMCNC: 33.8 G/DL (ref 32–36)
MCV RBC AUTO: 97 FL (ref 80–100)
PLATELET # BLD AUTO: 243 THOU/UL (ref 140–440)
PMV BLD AUTO: 7 FL (ref 7–10)
POTASSIUM BLD-SCNC: 3.4 MMOL/L (ref 3.5–5)
PROT SERPL-MCNC: 6.3 G/DL (ref 6–8)
RBC # BLD AUTO: 3.61 MILL/UL (ref 3.8–5.4)
SODIUM SERPL-SCNC: 138 MMOL/L (ref 136–145)
TSH SERPL DL<=0.005 MIU/L-ACNC: 0.75 UIU/ML (ref 0.3–5)
WBC: 3.6 THOU/UL (ref 4–11)

## 2018-11-21 ENCOUNTER — COMMUNICATION - HEALTHEAST (OUTPATIENT)
Dept: FAMILY MEDICINE | Facility: CLINIC | Age: 78
End: 2018-11-21

## 2018-11-23 ENCOUNTER — HOSPITAL ENCOUNTER (OUTPATIENT)
Dept: PALLIATIVE MEDICINE | Facility: OTHER | Age: 78
Discharge: HOME | End: 2018-11-23
Attending: ANESTHESIOLOGY

## 2018-11-23 DIAGNOSIS — R52 PAIN: ICD-10-CM

## 2018-11-23 DIAGNOSIS — G89.4 CHRONIC PAIN SYNDROME: ICD-10-CM

## 2018-11-23 DIAGNOSIS — R10.32 LEFT LOWER QUADRANT PAIN: ICD-10-CM

## 2018-11-23 ASSESSMENT — MIFFLIN-ST. JEOR: SCORE: 1081.89

## 2018-11-26 ENCOUNTER — OFFICE VISIT - HEALTHEAST (OUTPATIENT)
Dept: PHYSICAL THERAPY | Facility: REHABILITATION | Age: 78
End: 2018-11-26

## 2018-11-26 DIAGNOSIS — R10.32 LEFT LOWER QUADRANT PAIN: ICD-10-CM

## 2018-11-26 DIAGNOSIS — G90.523 COMPLEX REGIONAL PAIN SYNDROME TYPE 1 OF BOTH LOWER EXTREMITIES: ICD-10-CM

## 2018-11-26 DIAGNOSIS — M54.16 LUMBAR RADICULOPATHY: ICD-10-CM

## 2018-11-26 DIAGNOSIS — M54.50 ACUTE RIGHT-SIDED LOW BACK PAIN WITHOUT SCIATICA: ICD-10-CM

## 2018-11-26 DIAGNOSIS — R10.12 LEFT UPPER QUADRANT PAIN: ICD-10-CM

## 2018-11-26 DIAGNOSIS — G90.511 COMPLEX REGIONAL PAIN SYNDROME TYPE 1 OF RIGHT UPPER EXTREMITY: ICD-10-CM

## 2018-11-26 DIAGNOSIS — M54.50 ACUTE EXACERBATION OF CHRONIC LOW BACK PAIN: ICD-10-CM

## 2018-11-26 DIAGNOSIS — G89.4 CHRONIC PAIN SYNDROME: ICD-10-CM

## 2018-11-26 DIAGNOSIS — G89.29 ACUTE EXACERBATION OF CHRONIC LOW BACK PAIN: ICD-10-CM

## 2018-11-26 DIAGNOSIS — M54.50 LEFT-SIDED LOW BACK PAIN WITHOUT SCIATICA, UNSPECIFIED CHRONICITY: ICD-10-CM

## 2018-11-27 ENCOUNTER — COMMUNICATION - HEALTHEAST (OUTPATIENT)
Dept: PALLIATIVE MEDICINE | Facility: OTHER | Age: 78
End: 2018-11-27

## 2018-11-27 ENCOUNTER — COMMUNICATION - HEALTHEAST (OUTPATIENT)
Dept: FAMILY MEDICINE | Facility: CLINIC | Age: 78
End: 2018-11-27

## 2018-12-03 ENCOUNTER — OFFICE VISIT - HEALTHEAST (OUTPATIENT)
Dept: PHYSICAL THERAPY | Facility: REHABILITATION | Age: 78
End: 2018-12-03

## 2018-12-03 DIAGNOSIS — M54.16 LUMBAR RADICULOPATHY: ICD-10-CM

## 2018-12-03 DIAGNOSIS — G89.4 CHRONIC PAIN SYNDROME: ICD-10-CM

## 2018-12-03 DIAGNOSIS — R10.12 LEFT UPPER QUADRANT PAIN: ICD-10-CM

## 2018-12-03 DIAGNOSIS — G90.523 COMPLEX REGIONAL PAIN SYNDROME TYPE 1 OF BOTH LOWER EXTREMITIES: ICD-10-CM

## 2018-12-03 DIAGNOSIS — M54.50 LEFT-SIDED LOW BACK PAIN WITHOUT SCIATICA, UNSPECIFIED CHRONICITY: ICD-10-CM

## 2018-12-04 ENCOUNTER — OFFICE VISIT - HEALTHEAST (OUTPATIENT)
Dept: FAMILY MEDICINE | Facility: CLINIC | Age: 78
End: 2018-12-04

## 2018-12-04 DIAGNOSIS — Z01.810 PRE-OPERATIVE CARDIOVASCULAR EXAMINATION: ICD-10-CM

## 2018-12-04 DIAGNOSIS — I10 ESSENTIAL HYPERTENSION: ICD-10-CM

## 2018-12-04 DIAGNOSIS — I25.10 CORONARY ARTERY DISEASE INVOLVING NATIVE CORONARY ARTERY OF NATIVE HEART WITHOUT ANGINA PECTORIS: ICD-10-CM

## 2018-12-04 DIAGNOSIS — K86.1 CHRONIC PANCREATITIS, UNSPECIFIED PANCREATITIS TYPE (H): ICD-10-CM

## 2018-12-04 DIAGNOSIS — K59.03 CONSTIPATION DUE TO OPIOID THERAPY: ICD-10-CM

## 2018-12-04 DIAGNOSIS — R10.84 GENERALIZED ABDOMINAL PAIN: ICD-10-CM

## 2018-12-04 DIAGNOSIS — T40.2X5A CONSTIPATION DUE TO OPIOID THERAPY: ICD-10-CM

## 2018-12-04 LAB
ANION GAP SERPL CALCULATED.3IONS-SCNC: 10 MMOL/L (ref 5–18)
ATRIAL RATE - MUSE: 71 BPM
BUN SERPL-MCNC: 16 MG/DL (ref 8–28)
CALCIUM SERPL-MCNC: 8.9 MG/DL (ref 8.5–10.5)
CHLORIDE BLD-SCNC: 111 MMOL/L (ref 98–107)
CO2 SERPL-SCNC: 19 MMOL/L (ref 22–31)
CREAT SERPL-MCNC: 0.85 MG/DL (ref 0.6–1.1)
DIASTOLIC BLOOD PRESSURE - MUSE: NORMAL MMHG
ERYTHROCYTE [DISTWIDTH] IN BLOOD BY AUTOMATED COUNT: 12.9 % (ref 11–14.5)
GFR SERPL CREATININE-BSD FRML MDRD: >60 ML/MIN/1.73M2
GLUCOSE BLD-MCNC: 90 MG/DL (ref 70–125)
HCT VFR BLD AUTO: 37.2 % (ref 35–47)
HGB BLD-MCNC: 12.1 G/DL (ref 12–16)
INTERPRETATION ECG - MUSE: NORMAL
MCH RBC QN AUTO: 31.4 PG (ref 27–34)
MCHC RBC AUTO-ENTMCNC: 32.5 G/DL (ref 32–36)
MCV RBC AUTO: 96 FL (ref 80–100)
P AXIS - MUSE: 58 DEGREES
PLATELET # BLD AUTO: 195 THOU/UL (ref 140–440)
PMV BLD AUTO: 7.9 FL (ref 7–10)
POTASSIUM BLD-SCNC: 3.7 MMOL/L (ref 3.5–5)
PR INTERVAL - MUSE: 128 MS
QRS DURATION - MUSE: 82 MS
QT - MUSE: 404 MS
QTC - MUSE: 439 MS
R AXIS - MUSE: 36 DEGREES
RBC # BLD AUTO: 3.86 MILL/UL (ref 3.8–5.4)
SODIUM SERPL-SCNC: 140 MMOL/L (ref 136–145)
SYSTOLIC BLOOD PRESSURE - MUSE: NORMAL MMHG
T AXIS - MUSE: 66 DEGREES
VENTRICULAR RATE- MUSE: 71 BPM
WBC: 6.3 THOU/UL (ref 4–11)

## 2018-12-04 ASSESSMENT — MIFFLIN-ST. JEOR: SCORE: 1081.89

## 2018-12-10 ENCOUNTER — OFFICE VISIT - HEALTHEAST (OUTPATIENT)
Dept: PHYSICAL THERAPY | Facility: REHABILITATION | Age: 78
End: 2018-12-10

## 2018-12-10 DIAGNOSIS — M54.50 ACUTE RIGHT-SIDED LOW BACK PAIN WITHOUT SCIATICA: ICD-10-CM

## 2018-12-10 DIAGNOSIS — M48.07 STENOSIS OF LATERAL RECESS OF LUMBOSACRAL SPINE: ICD-10-CM

## 2018-12-10 DIAGNOSIS — G90.50 REFLEX SYMPATHETIC DYSTROPHY: ICD-10-CM

## 2018-12-10 DIAGNOSIS — R22.40 LOCALIZED SWELLING OF LOWER LEG: ICD-10-CM

## 2018-12-10 DIAGNOSIS — G89.4 CHRONIC PAIN SYNDROME: ICD-10-CM

## 2018-12-10 DIAGNOSIS — G90.523 COMPLEX REGIONAL PAIN SYNDROME TYPE 1 OF BOTH LOWER EXTREMITIES: ICD-10-CM

## 2018-12-11 ENCOUNTER — OFFICE VISIT - HEALTHEAST (OUTPATIENT)
Dept: PHYSICAL MEDICINE AND REHAB | Facility: REHABILITATION | Age: 78
End: 2018-12-11

## 2018-12-11 DIAGNOSIS — M99.06 SOMATIC DYSFUNCTION OF LOWER EXTREMITY: ICD-10-CM

## 2018-12-11 DIAGNOSIS — M99.03 SOMATIC DYSFUNCTION OF LUMBAR REGION: ICD-10-CM

## 2018-12-11 DIAGNOSIS — M99.08 SOMATIC DYSFUNCTION OF RIB REGION: ICD-10-CM

## 2018-12-11 DIAGNOSIS — M99.01 SOMATIC DYSFUNCTION OF CERVICAL REGION: ICD-10-CM

## 2018-12-11 DIAGNOSIS — M99.07 SOMATIC DYSFUNCTION OF UPPER EXTREMITY: ICD-10-CM

## 2018-12-11 DIAGNOSIS — M25.562 CHRONIC PAIN OF LEFT KNEE: ICD-10-CM

## 2018-12-11 DIAGNOSIS — G89.29 CHRONIC PAIN OF RIGHT KNEE: ICD-10-CM

## 2018-12-11 DIAGNOSIS — M99.00 SOMATIC DYSFUNCTION OF HEAD REGION: ICD-10-CM

## 2018-12-11 DIAGNOSIS — G89.4 CHRONIC PAIN SYNDROME: ICD-10-CM

## 2018-12-11 DIAGNOSIS — M99.05 SOMATIC DYSFUNCTION OF PELVIS REGION: ICD-10-CM

## 2018-12-11 DIAGNOSIS — M25.561 CHRONIC PAIN OF RIGHT KNEE: ICD-10-CM

## 2018-12-11 DIAGNOSIS — G89.29 CHRONIC PAIN OF LEFT KNEE: ICD-10-CM

## 2018-12-11 DIAGNOSIS — M99.02 SOMATIC DYSFUNCTION OF THORACIC REGION: ICD-10-CM

## 2018-12-11 DIAGNOSIS — M79.18 MYOFASCIAL PAIN: ICD-10-CM

## 2018-12-11 DIAGNOSIS — M99.04 SOMATIC DYSFUNCTION OF SACROILIAC JOINT: ICD-10-CM

## 2018-12-11 ASSESSMENT — MIFFLIN-ST. JEOR: SCORE: 1081.89

## 2018-12-13 ENCOUNTER — ANESTHESIA - HEALTHEAST (OUTPATIENT)
Dept: SURGERY | Facility: HOSPITAL | Age: 78
End: 2018-12-13

## 2018-12-13 ENCOUNTER — COMMUNICATION - HEALTHEAST (OUTPATIENT)
Dept: PALLIATIVE MEDICINE | Facility: OTHER | Age: 78
End: 2018-12-13

## 2018-12-13 ENCOUNTER — COMMUNICATION - HEALTHEAST (OUTPATIENT)
Dept: FAMILY MEDICINE | Facility: CLINIC | Age: 78
End: 2018-12-13

## 2018-12-13 DIAGNOSIS — G90.523 COMPLEX REGIONAL PAIN SYNDROME TYPE 1 OF BOTH LOWER EXTREMITIES: ICD-10-CM

## 2018-12-13 ASSESSMENT — MIFFLIN-ST. JEOR: SCORE: 1081.89

## 2018-12-14 ENCOUNTER — SURGERY - HEALTHEAST (OUTPATIENT)
Dept: SURGERY | Facility: HOSPITAL | Age: 78
End: 2018-12-14

## 2018-12-17 ENCOUNTER — OFFICE VISIT - HEALTHEAST (OUTPATIENT)
Dept: PHYSICAL THERAPY | Facility: REHABILITATION | Age: 78
End: 2018-12-17

## 2018-12-17 DIAGNOSIS — M48.07 STENOSIS OF LATERAL RECESS OF LUMBOSACRAL SPINE: ICD-10-CM

## 2018-12-17 DIAGNOSIS — R22.40 LOCALIZED SWELLING OF LOWER LEG: ICD-10-CM

## 2018-12-17 DIAGNOSIS — G90.523 COMPLEX REGIONAL PAIN SYNDROME TYPE 1 OF BOTH LOWER EXTREMITIES: ICD-10-CM

## 2018-12-17 DIAGNOSIS — M54.16 LUMBAR RADICULOPATHY: ICD-10-CM

## 2018-12-17 DIAGNOSIS — M54.50 LEFT-SIDED LOW BACK PAIN WITHOUT SCIATICA, UNSPECIFIED CHRONICITY: ICD-10-CM

## 2018-12-17 DIAGNOSIS — G89.4 CHRONIC PAIN SYNDROME: ICD-10-CM

## 2018-12-17 DIAGNOSIS — G44.029 CHRONIC CLUSTER HEADACHE, NOT INTRACTABLE: ICD-10-CM

## 2018-12-17 DIAGNOSIS — M54.50 ACUTE RIGHT-SIDED LOW BACK PAIN WITHOUT SCIATICA: ICD-10-CM

## 2018-12-17 DIAGNOSIS — R10.32 LEFT LOWER QUADRANT PAIN: ICD-10-CM

## 2018-12-18 ENCOUNTER — COMMUNICATION - HEALTHEAST (OUTPATIENT)
Dept: PHYSICAL MEDICINE AND REHAB | Facility: CLINIC | Age: 78
End: 2018-12-18

## 2018-12-19 ENCOUNTER — RECORDS - HEALTHEAST (OUTPATIENT)
Dept: ADMINISTRATIVE | Facility: OTHER | Age: 78
End: 2018-12-19

## 2018-12-24 ENCOUNTER — RECORDS - HEALTHEAST (OUTPATIENT)
Dept: ADMINISTRATIVE | Facility: OTHER | Age: 78
End: 2018-12-24

## 2018-12-24 ENCOUNTER — OFFICE VISIT - HEALTHEAST (OUTPATIENT)
Dept: PHYSICAL THERAPY | Facility: REHABILITATION | Age: 78
End: 2018-12-24

## 2018-12-24 DIAGNOSIS — M48.07 STENOSIS OF LATERAL RECESS OF LUMBOSACRAL SPINE: ICD-10-CM

## 2018-12-24 DIAGNOSIS — G89.4 CHRONIC PAIN SYNDROME: ICD-10-CM

## 2018-12-24 DIAGNOSIS — M54.50 ACUTE RIGHT-SIDED LOW BACK PAIN WITHOUT SCIATICA: ICD-10-CM

## 2018-12-24 DIAGNOSIS — R22.40 LOCALIZED SWELLING OF LOWER LEG: ICD-10-CM

## 2018-12-24 DIAGNOSIS — M54.16 LUMBAR RADICULOPATHY: ICD-10-CM

## 2018-12-24 DIAGNOSIS — M54.50 LEFT-SIDED LOW BACK PAIN WITHOUT SCIATICA, UNSPECIFIED CHRONICITY: ICD-10-CM

## 2018-12-24 DIAGNOSIS — G90.523 COMPLEX REGIONAL PAIN SYNDROME TYPE 1 OF BOTH LOWER EXTREMITIES: ICD-10-CM

## 2018-12-24 DIAGNOSIS — G90.511 COMPLEX REGIONAL PAIN SYNDROME TYPE 1 OF RIGHT UPPER EXTREMITY: ICD-10-CM

## 2018-12-26 ENCOUNTER — COMMUNICATION - HEALTHEAST (OUTPATIENT)
Dept: PALLIATIVE MEDICINE | Facility: OTHER | Age: 78
End: 2018-12-26

## 2018-12-27 ENCOUNTER — HOSPITAL ENCOUNTER (OUTPATIENT)
Dept: PALLIATIVE MEDICINE | Facility: OTHER | Age: 78
Discharge: HOME | End: 2018-12-27
Attending: PHYSICAL MEDICINE & REHABILITATION

## 2018-12-27 DIAGNOSIS — F33.2 SEVERE RECURRENT MAJOR DEPRESSION WITHOUT PSYCHOTIC FEATURES (H): ICD-10-CM

## 2018-12-27 DIAGNOSIS — G89.4 CHRONIC PAIN SYNDROME: ICD-10-CM

## 2018-12-27 DIAGNOSIS — F41.1 GENERALIZED ANXIETY DISORDER: ICD-10-CM

## 2018-12-31 ENCOUNTER — OFFICE VISIT - HEALTHEAST (OUTPATIENT)
Dept: PHYSICAL THERAPY | Facility: REHABILITATION | Age: 78
End: 2018-12-31

## 2018-12-31 DIAGNOSIS — R22.40 LOCALIZED SWELLING OF LOWER LEG: ICD-10-CM

## 2018-12-31 DIAGNOSIS — M54.12 CERVICAL RADICULITIS: ICD-10-CM

## 2018-12-31 DIAGNOSIS — G90.511 COMPLEX REGIONAL PAIN SYNDROME TYPE 1 OF RIGHT UPPER EXTREMITY: ICD-10-CM

## 2018-12-31 DIAGNOSIS — G90.523 COMPLEX REGIONAL PAIN SYNDROME TYPE 1 OF BOTH LOWER EXTREMITIES: ICD-10-CM

## 2018-12-31 DIAGNOSIS — G89.4 CHRONIC PAIN SYNDROME: ICD-10-CM

## 2018-12-31 DIAGNOSIS — M48.07 STENOSIS OF LATERAL RECESS OF LUMBOSACRAL SPINE: ICD-10-CM

## 2018-12-31 DIAGNOSIS — M54.6 THORACIC SPINE PAIN: ICD-10-CM

## 2018-12-31 DIAGNOSIS — M54.16 LUMBAR RADICULOPATHY: ICD-10-CM

## 2019-01-02 ENCOUNTER — COMMUNICATION - HEALTHEAST (OUTPATIENT)
Dept: PALLIATIVE MEDICINE | Facility: OTHER | Age: 79
End: 2019-01-02

## 2019-01-04 ENCOUNTER — RECORDS - HEALTHEAST (OUTPATIENT)
Dept: GENERAL RADIOLOGY | Facility: CLINIC | Age: 79
End: 2019-01-04

## 2019-01-04 ENCOUNTER — AMBULATORY - HEALTHEAST (OUTPATIENT)
Dept: SCHEDULING | Facility: CLINIC | Age: 79
End: 2019-01-04

## 2019-01-04 ENCOUNTER — OFFICE VISIT - HEALTHEAST (OUTPATIENT)
Dept: FAMILY MEDICINE | Facility: CLINIC | Age: 79
End: 2019-01-04

## 2019-01-04 ENCOUNTER — COMMUNICATION - HEALTHEAST (OUTPATIENT)
Dept: PALLIATIVE MEDICINE | Facility: OTHER | Age: 79
End: 2019-01-04

## 2019-01-04 ENCOUNTER — COMMUNICATION - HEALTHEAST (OUTPATIENT)
Dept: FAMILY MEDICINE | Facility: CLINIC | Age: 79
End: 2019-01-04

## 2019-01-04 DIAGNOSIS — M85.89 OSTEOPENIA OF MULTIPLE SITES: ICD-10-CM

## 2019-01-04 DIAGNOSIS — M25.552 HIP PAIN, LEFT: ICD-10-CM

## 2019-01-04 DIAGNOSIS — M25.561 CHRONIC PAIN OF BOTH KNEES: ICD-10-CM

## 2019-01-04 DIAGNOSIS — M25.561 PAIN IN RIGHT KNEE: ICD-10-CM

## 2019-01-04 DIAGNOSIS — N18.30 CHRONIC KIDNEY DISEASE, STAGE III (MODERATE) (H): ICD-10-CM

## 2019-01-04 DIAGNOSIS — F33.1 MODERATE EPISODE OF RECURRENT MAJOR DEPRESSIVE DISORDER (H): ICD-10-CM

## 2019-01-04 DIAGNOSIS — M25.562 CHRONIC PAIN OF BOTH KNEES: ICD-10-CM

## 2019-01-04 DIAGNOSIS — M25.50 DIFFUSE ARTHRALGIA: ICD-10-CM

## 2019-01-04 DIAGNOSIS — G89.29 OTHER CHRONIC PAIN: ICD-10-CM

## 2019-01-04 DIAGNOSIS — G90.523 COMPLEX REGIONAL PAIN SYNDROME TYPE 1 OF BOTH LOWER EXTREMITIES: ICD-10-CM

## 2019-01-04 DIAGNOSIS — M25.552 PAIN IN LEFT HIP: ICD-10-CM

## 2019-01-04 DIAGNOSIS — M25.562 PAIN IN LEFT KNEE: ICD-10-CM

## 2019-01-04 DIAGNOSIS — M25.522 LEFT ELBOW PAIN: ICD-10-CM

## 2019-01-04 DIAGNOSIS — M25.522 PAIN IN LEFT ELBOW: ICD-10-CM

## 2019-01-04 DIAGNOSIS — E03.9 ACQUIRED HYPOTHYROIDISM: ICD-10-CM

## 2019-01-04 DIAGNOSIS — G89.29 CHRONIC PAIN OF BOTH KNEES: ICD-10-CM

## 2019-01-04 LAB
BASOPHILS # BLD AUTO: 0 THOU/UL (ref 0–0.2)
BASOPHILS NFR BLD AUTO: 0 % (ref 0–2)
C REACTIVE PROTEIN LHE: 0.1 MG/DL (ref 0–0.8)
EOSINOPHIL # BLD AUTO: 0.1 THOU/UL (ref 0–0.4)
EOSINOPHIL NFR BLD AUTO: 2 % (ref 0–6)
ERYTHROCYTE [DISTWIDTH] IN BLOOD BY AUTOMATED COUNT: 11.9 % (ref 11–14.5)
ERYTHROCYTE [SEDIMENTATION RATE] IN BLOOD BY WESTERGREN METHOD: 7 MM/HR (ref 0–20)
HCT VFR BLD AUTO: 38.3 % (ref 35–47)
HGB BLD-MCNC: 12.8 G/DL (ref 12–16)
LYMPHOCYTES # BLD AUTO: 1.7 THOU/UL (ref 0.8–4.4)
LYMPHOCYTES NFR BLD AUTO: 34 % (ref 20–40)
MCH RBC QN AUTO: 32.2 PG (ref 27–34)
MCHC RBC AUTO-ENTMCNC: 33.5 G/DL (ref 32–36)
MCV RBC AUTO: 96 FL (ref 80–100)
MONOCYTES # BLD AUTO: 0.5 THOU/UL (ref 0–0.9)
MONOCYTES NFR BLD AUTO: 9 % (ref 2–10)
NEUTROPHILS # BLD AUTO: 2.7 THOU/UL (ref 2–7.7)
NEUTROPHILS NFR BLD AUTO: 54 % (ref 50–70)
PLATELET # BLD AUTO: 168 THOU/UL (ref 140–440)
PMV BLD AUTO: 7.9 FL (ref 7–10)
RBC # BLD AUTO: 3.99 MILL/UL (ref 3.8–5.4)
RHEUMATOID FACT SERPL-ACNC: <15 IU/ML (ref 0–30)
T3FREE SERPL-MCNC: 2.1 PG/ML (ref 1.9–3.9)
WBC: 5 THOU/UL (ref 4–11)

## 2019-01-05 LAB — 25(OH)D3 SERPL-MCNC: 39.8 NG/ML (ref 30–80)

## 2019-01-07 ENCOUNTER — OFFICE VISIT - HEALTHEAST (OUTPATIENT)
Dept: PHYSICAL THERAPY | Facility: REHABILITATION | Age: 79
End: 2019-01-07

## 2019-01-07 ENCOUNTER — COMMUNICATION - HEALTHEAST (OUTPATIENT)
Dept: FAMILY MEDICINE | Facility: CLINIC | Age: 79
End: 2019-01-07

## 2019-01-07 DIAGNOSIS — M54.50 ACUTE RIGHT-SIDED LOW BACK PAIN WITHOUT SCIATICA: ICD-10-CM

## 2019-01-07 DIAGNOSIS — R22.40 LOCALIZED SWELLING OF LOWER LEG: ICD-10-CM

## 2019-01-07 DIAGNOSIS — G89.4 CHRONIC PAIN SYNDROME: ICD-10-CM

## 2019-01-07 DIAGNOSIS — M54.16 LUMBAR RADICULOPATHY: ICD-10-CM

## 2019-01-07 DIAGNOSIS — G90.523 COMPLEX REGIONAL PAIN SYNDROME TYPE 1 OF BOTH LOWER EXTREMITIES: ICD-10-CM

## 2019-01-07 DIAGNOSIS — M48.07 STENOSIS OF LATERAL RECESS OF LUMBOSACRAL SPINE: ICD-10-CM

## 2019-01-07 DIAGNOSIS — R35.0 URINARY FREQUENCY: ICD-10-CM

## 2019-01-07 LAB — ANA SER QL: 0.6 U

## 2019-01-08 ENCOUNTER — COMMUNICATION - HEALTHEAST (OUTPATIENT)
Dept: PALLIATIVE MEDICINE | Facility: OTHER | Age: 79
End: 2019-01-08

## 2019-01-08 ENCOUNTER — OFFICE VISIT - HEALTHEAST (OUTPATIENT)
Dept: PHYSICAL MEDICINE AND REHAB | Facility: REHABILITATION | Age: 79
End: 2019-01-08

## 2019-01-08 ENCOUNTER — COMMUNICATION - HEALTHEAST (OUTPATIENT)
Dept: FAMILY MEDICINE | Facility: CLINIC | Age: 79
End: 2019-01-08

## 2019-01-08 DIAGNOSIS — M99.04 SOMATIC DYSFUNCTION OF SACROILIAC JOINT: ICD-10-CM

## 2019-01-08 DIAGNOSIS — M25.561 CHRONIC PAIN OF BOTH KNEES: ICD-10-CM

## 2019-01-08 DIAGNOSIS — M99.08 SOMATIC DYSFUNCTION OF RIB REGION: ICD-10-CM

## 2019-01-08 DIAGNOSIS — M99.05 SOMATIC DYSFUNCTION OF PELVIS REGION: ICD-10-CM

## 2019-01-08 DIAGNOSIS — M99.07 SOMATIC DYSFUNCTION OF UPPER EXTREMITY: ICD-10-CM

## 2019-01-08 DIAGNOSIS — G89.4 CHRONIC PAIN SYNDROME: ICD-10-CM

## 2019-01-08 DIAGNOSIS — M99.06 SOMATIC DYSFUNCTION OF LOWER EXTREMITY: ICD-10-CM

## 2019-01-08 DIAGNOSIS — M99.03 SOMATIC DYSFUNCTION OF LUMBAR REGION: ICD-10-CM

## 2019-01-08 DIAGNOSIS — M99.01 SOMATIC DYSFUNCTION OF CERVICAL REGION: ICD-10-CM

## 2019-01-08 DIAGNOSIS — M99.00 SOMATIC DYSFUNCTION OF HEAD REGION: ICD-10-CM

## 2019-01-08 DIAGNOSIS — G89.29 CHRONIC PAIN OF BOTH KNEES: ICD-10-CM

## 2019-01-08 DIAGNOSIS — M17.0 BILATERAL PRIMARY OSTEOARTHRITIS OF KNEE: ICD-10-CM

## 2019-01-08 DIAGNOSIS — M79.18 MYOFASCIAL PAIN: ICD-10-CM

## 2019-01-08 DIAGNOSIS — M25.562 CHRONIC PAIN OF BOTH KNEES: ICD-10-CM

## 2019-01-08 DIAGNOSIS — M99.02 SOMATIC DYSFUNCTION OF THORACIC REGION: ICD-10-CM

## 2019-01-08 ASSESSMENT — MIFFLIN-ST. JEOR: SCORE: 1041.07

## 2019-01-09 ENCOUNTER — HOSPITAL ENCOUNTER (OUTPATIENT)
Dept: PALLIATIVE MEDICINE | Facility: OTHER | Age: 79
Discharge: HOME | End: 2019-01-09
Attending: PHYSICAL MEDICINE & REHABILITATION | Admitting: PAIN MEDICINE

## 2019-01-09 DIAGNOSIS — M25.561 CHRONIC PAIN OF RIGHT KNEE: ICD-10-CM

## 2019-01-09 DIAGNOSIS — G89.29 CHRONIC PAIN OF RIGHT KNEE: ICD-10-CM

## 2019-01-09 DIAGNOSIS — M25.562 CHRONIC PAIN OF LEFT KNEE: ICD-10-CM

## 2019-01-09 DIAGNOSIS — G89.29 CHRONIC PAIN OF LEFT KNEE: ICD-10-CM

## 2019-01-09 DIAGNOSIS — M19.90 OSTEOARTHRITIS: ICD-10-CM

## 2019-01-09 ASSESSMENT — MIFFLIN-ST. JEOR: SCORE: 1041.07

## 2019-01-10 ENCOUNTER — COMMUNICATION - HEALTHEAST (OUTPATIENT)
Dept: PALLIATIVE MEDICINE | Facility: OTHER | Age: 79
End: 2019-01-10

## 2019-01-11 ENCOUNTER — HOSPITAL ENCOUNTER (OUTPATIENT)
Dept: PALLIATIVE MEDICINE | Facility: OTHER | Age: 79
Discharge: HOME | End: 2019-01-11
Attending: ANESTHESIOLOGY

## 2019-01-11 ENCOUNTER — HOSPITAL ENCOUNTER (OUTPATIENT)
Dept: PALLIATIVE MEDICINE | Facility: OTHER | Age: 79
Discharge: HOME | End: 2019-01-11
Attending: SOCIAL WORKER

## 2019-01-11 DIAGNOSIS — M54.16 LUMBAR RADICULOPATHY: ICD-10-CM

## 2019-01-11 DIAGNOSIS — G89.4 CHRONIC PAIN SYNDROME: ICD-10-CM

## 2019-01-11 DIAGNOSIS — F33.2 SEVERE RECURRENT MAJOR DEPRESSION WITHOUT PSYCHOTIC FEATURES (H): ICD-10-CM

## 2019-01-11 DIAGNOSIS — F41.1 GENERALIZED ANXIETY DISORDER: ICD-10-CM

## 2019-01-11 DIAGNOSIS — G90.511 COMPLEX REGIONAL PAIN SYNDROME TYPE 1 OF RIGHT UPPER EXTREMITY: ICD-10-CM

## 2019-01-11 ASSESSMENT — MIFFLIN-ST. JEOR: SCORE: 1041.07

## 2019-01-15 ENCOUNTER — COMMUNICATION - HEALTHEAST (OUTPATIENT)
Dept: PALLIATIVE MEDICINE | Facility: OTHER | Age: 79
End: 2019-01-15

## 2019-01-15 ENCOUNTER — COMMUNICATION - HEALTHEAST (OUTPATIENT)
Dept: ADMINISTRATIVE | Facility: CLINIC | Age: 79
End: 2019-01-15

## 2019-01-15 DIAGNOSIS — M25.562 CHRONIC PAIN OF LEFT KNEE: ICD-10-CM

## 2019-01-15 DIAGNOSIS — G89.29 CHRONIC PAIN OF LEFT KNEE: ICD-10-CM

## 2019-01-15 DIAGNOSIS — M85.89 OSTEOPENIA OF MULTIPLE SITES: ICD-10-CM

## 2019-01-18 ENCOUNTER — COMMUNICATION - HEALTHEAST (OUTPATIENT)
Dept: PALLIATIVE MEDICINE | Facility: OTHER | Age: 79
End: 2019-01-18

## 2019-01-18 ENCOUNTER — OFFICE VISIT - HEALTHEAST (OUTPATIENT)
Dept: FAMILY MEDICINE | Facility: CLINIC | Age: 79
End: 2019-01-18

## 2019-01-18 DIAGNOSIS — F41.9 ANXIETY AND DEPRESSION: ICD-10-CM

## 2019-01-18 DIAGNOSIS — B35.1 TOENAIL FUNGUS: ICD-10-CM

## 2019-01-18 DIAGNOSIS — E78.2 MIXED HYPERLIPIDEMIA: ICD-10-CM

## 2019-01-18 DIAGNOSIS — F32.A ANXIETY AND DEPRESSION: ICD-10-CM

## 2019-01-21 ENCOUNTER — COMMUNICATION - HEALTHEAST (OUTPATIENT)
Dept: PALLIATIVE MEDICINE | Facility: OTHER | Age: 79
End: 2019-01-21

## 2019-01-22 ENCOUNTER — HOSPITAL ENCOUNTER (OUTPATIENT)
Dept: PALLIATIVE MEDICINE | Facility: OTHER | Age: 79
Discharge: HOME | End: 2019-01-22
Attending: PAIN MEDICINE | Admitting: PAIN MEDICINE

## 2019-01-22 DIAGNOSIS — M19.90 OSTEOARTHRITIS: ICD-10-CM

## 2019-01-22 DIAGNOSIS — M17.0 PRIMARY OSTEOARTHRITIS OF BOTH KNEES: ICD-10-CM

## 2019-01-22 ASSESSMENT — MIFFLIN-ST. JEOR: SCORE: 1036.53

## 2019-01-25 ENCOUNTER — COMMUNICATION - HEALTHEAST (OUTPATIENT)
Dept: PALLIATIVE MEDICINE | Facility: OTHER | Age: 79
End: 2019-01-25

## 2019-01-28 ENCOUNTER — COMMUNICATION - HEALTHEAST (OUTPATIENT)
Dept: FAMILY MEDICINE | Facility: CLINIC | Age: 79
End: 2019-01-28

## 2019-01-30 ENCOUNTER — COMMUNICATION - HEALTHEAST (OUTPATIENT)
Dept: ENDOCRINOLOGY | Facility: CLINIC | Age: 79
End: 2019-01-30

## 2019-02-04 ENCOUNTER — HOME CARE/HOSPICE - HEALTHEAST (OUTPATIENT)
Dept: HOME HEALTH SERVICES | Facility: HOME HEALTH | Age: 79
End: 2019-02-04

## 2019-02-04 ENCOUNTER — COMMUNICATION - HEALTHEAST (OUTPATIENT)
Dept: FAMILY MEDICINE | Facility: CLINIC | Age: 79
End: 2019-02-04

## 2019-02-05 ENCOUNTER — COMMUNICATION - HEALTHEAST (OUTPATIENT)
Dept: HOME HEALTH SERVICES | Facility: HOME HEALTH | Age: 79
End: 2019-02-05

## 2019-02-06 ENCOUNTER — COMMUNICATION - HEALTHEAST (OUTPATIENT)
Dept: ANTICOAGULATION | Facility: CLINIC | Age: 79
End: 2019-02-06

## 2019-02-06 ENCOUNTER — COMMUNICATION - HEALTHEAST (OUTPATIENT)
Dept: HOME HEALTH SERVICES | Facility: HOME HEALTH | Age: 79
End: 2019-02-06

## 2019-02-06 ENCOUNTER — OFFICE VISIT - HEALTHEAST (OUTPATIENT)
Dept: FAMILY MEDICINE | Facility: CLINIC | Age: 79
End: 2019-02-06

## 2019-02-06 ENCOUNTER — COMMUNICATION - HEALTHEAST (OUTPATIENT)
Dept: CARE COORDINATION | Facility: CLINIC | Age: 79
End: 2019-02-06

## 2019-02-06 DIAGNOSIS — R11.0 NAUSEA: ICD-10-CM

## 2019-02-06 DIAGNOSIS — I26.99 PULMONARY EMBOLUS, RIGHT (H): ICD-10-CM

## 2019-02-06 DIAGNOSIS — R63.4 WEIGHT LOSS: ICD-10-CM

## 2019-02-06 DIAGNOSIS — F11.20 UNCOMPLICATED OPIOID DEPENDENCE (H): ICD-10-CM

## 2019-02-06 LAB — INR PPP: 2.9 (ref 0.9–1.1)

## 2019-02-07 ENCOUNTER — COMMUNICATION - HEALTHEAST (OUTPATIENT)
Dept: SCHEDULING | Facility: CLINIC | Age: 79
End: 2019-02-07

## 2019-02-07 ENCOUNTER — COMMUNICATION - HEALTHEAST (OUTPATIENT)
Dept: FAMILY MEDICINE | Facility: CLINIC | Age: 79
End: 2019-02-07

## 2019-02-07 ASSESSMENT — MIFFLIN-ST. JEOR
SCORE: 1042.88
SCORE: 1034.27

## 2019-02-08 ASSESSMENT — MIFFLIN-ST. JEOR: SCORE: 1071.47

## 2019-02-09 ENCOUNTER — ANESTHESIA - HEALTHEAST (OUTPATIENT)
Dept: SURGERY | Facility: CLINIC | Age: 79
End: 2019-02-09

## 2019-02-09 ENCOUNTER — AMBULATORY - HEALTHEAST (OUTPATIENT)
Dept: OTHER | Facility: CLINIC | Age: 79
End: 2019-02-09

## 2019-02-10 ENCOUNTER — SURGERY - HEALTHEAST (OUTPATIENT)
Dept: SURGERY | Facility: CLINIC | Age: 79
End: 2019-02-10

## 2019-02-13 ENCOUNTER — RECORDS - HEALTHEAST (OUTPATIENT)
Dept: ADMINISTRATIVE | Facility: OTHER | Age: 79
End: 2019-02-13

## 2019-02-13 ENCOUNTER — COMMUNICATION - HEALTHEAST (OUTPATIENT)
Dept: ANTICOAGULATION | Facility: CLINIC | Age: 79
End: 2019-02-13

## 2019-02-13 DIAGNOSIS — I27.82 OTHER CHRONIC PULMONARY EMBOLISM WITHOUT ACUTE COR PULMONALE (H): ICD-10-CM

## 2019-02-13 ASSESSMENT — MIFFLIN-ST. JEOR
SCORE: 1081.44
SCORE: 1067.84

## 2019-02-14 ENCOUNTER — COMMUNICATION - HEALTHEAST (OUTPATIENT)
Dept: PALLIATIVE MEDICINE | Facility: OTHER | Age: 79
End: 2019-02-14

## 2019-02-14 DIAGNOSIS — M25.562 KNEE PAIN, BILATERAL: ICD-10-CM

## 2019-02-14 DIAGNOSIS — M25.561 KNEE PAIN, BILATERAL: ICD-10-CM

## 2019-02-15 ENCOUNTER — ANESTHESIA - HEALTHEAST (OUTPATIENT)
Dept: SURGERY | Facility: HOSPITAL | Age: 79
End: 2019-02-15

## 2019-02-15 ENCOUNTER — SURGERY - HEALTHEAST (OUTPATIENT)
Dept: SURGERY | Facility: HOSPITAL | Age: 79
End: 2019-02-15

## 2019-02-16 ENCOUNTER — RECORDS - HEALTHEAST (OUTPATIENT)
Dept: ADMINISTRATIVE | Facility: OTHER | Age: 79
End: 2019-02-16

## 2019-02-16 ENCOUNTER — SURGERY - HEALTHEAST (OUTPATIENT)
Dept: SURGERY | Facility: HOSPITAL | Age: 79
End: 2019-02-16

## 2019-02-16 ENCOUNTER — ANESTHESIA - HEALTHEAST (OUTPATIENT)
Dept: SURGERY | Facility: HOSPITAL | Age: 79
End: 2019-02-16

## 2019-02-16 ASSESSMENT — MIFFLIN-ST. JEOR
SCORE: 1065.11

## 2019-02-18 ENCOUNTER — DOCUMENTATION ONLY (OUTPATIENT)
Dept: OTHER | Facility: CLINIC | Age: 79
End: 2019-02-18

## 2019-02-19 ENCOUNTER — RECORDS - HEALTHEAST (OUTPATIENT)
Dept: ADMINISTRATIVE | Facility: OTHER | Age: 79
End: 2019-02-19

## 2019-02-19 ASSESSMENT — MIFFLIN-ST. JEOR
SCORE: 1110.02

## 2019-02-20 ENCOUNTER — ANESTHESIA - HEALTHEAST (OUTPATIENT)
Dept: SURGERY | Facility: HOSPITAL | Age: 79
End: 2019-02-20

## 2019-02-20 ENCOUNTER — RECORDS - HEALTHEAST (OUTPATIENT)
Dept: ADMINISTRATIVE | Facility: OTHER | Age: 79
End: 2019-02-20

## 2019-02-20 ENCOUNTER — SURGERY - HEALTHEAST (OUTPATIENT)
Dept: SURGERY | Facility: HOSPITAL | Age: 79
End: 2019-02-20

## 2019-02-20 ASSESSMENT — MIFFLIN-ST. JEOR
SCORE: 1011.14

## 2019-02-22 ENCOUNTER — RECORDS - HEALTHEAST (OUTPATIENT)
Dept: ADMINISTRATIVE | Facility: OTHER | Age: 79
End: 2019-02-22

## 2019-02-22 ASSESSMENT — MIFFLIN-ST. JEOR
SCORE: 1082.19

## 2019-02-23 ENCOUNTER — RECORDS - HEALTHEAST (OUTPATIENT)
Dept: ADMINISTRATIVE | Facility: OTHER | Age: 79
End: 2019-02-23

## 2019-02-23 ASSESSMENT — MIFFLIN-ST. JEOR
SCORE: 1101.19

## 2019-02-24 ENCOUNTER — RECORDS - HEALTHEAST (OUTPATIENT)
Dept: ADMINISTRATIVE | Facility: OTHER | Age: 79
End: 2019-02-24

## 2019-02-24 ENCOUNTER — ANESTHESIA - HEALTHEAST (OUTPATIENT)
Dept: SURGERY | Facility: HOSPITAL | Age: 79
End: 2019-02-24

## 2019-02-24 ASSESSMENT — MIFFLIN-ST. JEOR
SCORE: 1124.19

## 2019-02-26 ENCOUNTER — RECORDS - HEALTHEAST (OUTPATIENT)
Dept: ADMINISTRATIVE | Facility: OTHER | Age: 79
End: 2019-02-26

## 2019-02-28 ENCOUNTER — RECORDS - HEALTHEAST (OUTPATIENT)
Dept: ADMINISTRATIVE | Facility: OTHER | Age: 79
End: 2019-02-28

## 2019-02-28 ASSESSMENT — MIFFLIN-ST. JEOR
SCORE: 1071.69

## 2019-03-01 ENCOUNTER — COMMUNICATION - HEALTHEAST (OUTPATIENT)
Dept: FAMILY MEDICINE | Facility: CLINIC | Age: 79
End: 2019-03-01

## 2019-03-01 DIAGNOSIS — G47.00 INSOMNIA: ICD-10-CM

## 2019-03-04 ENCOUNTER — COMMUNICATION - HEALTHEAST (OUTPATIENT)
Dept: FAMILY MEDICINE | Facility: CLINIC | Age: 79
End: 2019-03-04

## 2019-03-04 ENCOUNTER — OFFICE VISIT - HEALTHEAST (OUTPATIENT)
Dept: GERIATRICS | Facility: CLINIC | Age: 79
End: 2019-03-04

## 2019-03-04 ENCOUNTER — COMMUNICATION - HEALTHEAST (OUTPATIENT)
Dept: ANTICOAGULATION | Facility: CLINIC | Age: 79
End: 2019-03-04

## 2019-03-04 ENCOUNTER — RECORDS - HEALTHEAST (OUTPATIENT)
Dept: LAB | Facility: CLINIC | Age: 79
End: 2019-03-04

## 2019-03-04 DIAGNOSIS — I26.99 OTHER ACUTE PULMONARY EMBOLISM WITHOUT ACUTE COR PULMONALE (H): ICD-10-CM

## 2019-03-04 DIAGNOSIS — G90.50 REFLEX SYMPATHETIC DYSTROPHY: ICD-10-CM

## 2019-03-04 DIAGNOSIS — I27.82 OTHER CHRONIC PULMONARY EMBOLISM WITHOUT ACUTE COR PULMONALE (H): ICD-10-CM

## 2019-03-04 DIAGNOSIS — F33.1 MODERATE EPISODE OF RECURRENT MAJOR DEPRESSIVE DISORDER (H): ICD-10-CM

## 2019-03-05 ENCOUNTER — COMMUNICATION - HEALTHEAST (OUTPATIENT)
Dept: PALLIATIVE MEDICINE | Facility: OTHER | Age: 79
End: 2019-03-05

## 2019-03-05 ENCOUNTER — OFFICE VISIT - HEALTHEAST (OUTPATIENT)
Dept: GERIATRICS | Facility: CLINIC | Age: 79
End: 2019-03-05

## 2019-03-05 DIAGNOSIS — F06.30 MOOD DISORDER DUE TO MEDICAL CONDITION: ICD-10-CM

## 2019-03-05 DIAGNOSIS — D62 ANEMIA DUE TO BLOOD LOSS, ACUTE: ICD-10-CM

## 2019-03-05 DIAGNOSIS — R31.0 GROSS HEMATURIA: ICD-10-CM

## 2019-03-05 DIAGNOSIS — I26.99 OTHER ACUTE PULMONARY EMBOLISM WITHOUT ACUTE COR PULMONALE (H): ICD-10-CM

## 2019-03-05 DIAGNOSIS — M62.81 GENERALIZED MUSCLE WEAKNESS: ICD-10-CM

## 2019-03-05 DIAGNOSIS — F06.4 ANXIETY DISORDER DUE TO MEDICAL CONDITION: ICD-10-CM

## 2019-03-05 LAB
ANION GAP SERPL CALCULATED.3IONS-SCNC: 5 MMOL/L (ref 5–18)
BASOPHILS # BLD AUTO: 0 THOU/UL (ref 0–0.2)
BASOPHILS NFR BLD AUTO: 1 % (ref 0–2)
BUN SERPL-MCNC: 19 MG/DL (ref 8–28)
CALCIUM SERPL-MCNC: 8 MG/DL (ref 8.5–10.5)
CHLORIDE BLD-SCNC: 106 MMOL/L (ref 98–107)
CO2 SERPL-SCNC: 26 MMOL/L (ref 22–31)
CREAT SERPL-MCNC: 1.2 MG/DL (ref 0.6–1.1)
EOSINOPHIL # BLD AUTO: 0.3 THOU/UL (ref 0–0.4)
EOSINOPHIL NFR BLD AUTO: 6 % (ref 0–6)
ERYTHROCYTE [DISTWIDTH] IN BLOOD BY AUTOMATED COUNT: 15.3 % (ref 11–14.5)
GFR SERPL CREATININE-BSD FRML MDRD: 44 ML/MIN/1.73M2
GLUCOSE BLD-MCNC: 75 MG/DL (ref 70–125)
HCT VFR BLD AUTO: 26.1 % (ref 35–47)
HGB BLD-MCNC: 8.3 G/DL (ref 12–16)
LYMPHOCYTES # BLD AUTO: 0.9 THOU/UL (ref 0.8–4.4)
LYMPHOCYTES NFR BLD AUTO: 20 % (ref 20–40)
MAGNESIUM SERPL-MCNC: 2 MG/DL (ref 1.8–2.6)
MCH RBC QN AUTO: 29.9 PG (ref 27–34)
MCHC RBC AUTO-ENTMCNC: 31.8 G/DL (ref 32–36)
MCV RBC AUTO: 94 FL (ref 80–100)
MONOCYTES # BLD AUTO: 0.8 THOU/UL (ref 0–0.9)
MONOCYTES NFR BLD AUTO: 19 % (ref 2–10)
NEUTROPHILS # BLD AUTO: 2.3 THOU/UL (ref 2–7.7)
NEUTROPHILS NFR BLD AUTO: 55 % (ref 50–70)
PLATELET # BLD AUTO: 214 THOU/UL (ref 140–440)
PMV BLD AUTO: 9.2 FL (ref 8.5–12.5)
POTASSIUM BLD-SCNC: 4 MMOL/L (ref 3.5–5)
RBC # BLD AUTO: 2.78 MILL/UL (ref 3.8–5.4)
SODIUM SERPL-SCNC: 137 MMOL/L (ref 136–145)
WBC: 4.3 THOU/UL (ref 4–11)

## 2019-03-07 ENCOUNTER — OFFICE VISIT - HEALTHEAST (OUTPATIENT)
Dept: GERIATRICS | Facility: CLINIC | Age: 79
End: 2019-03-07

## 2019-03-07 ENCOUNTER — COMMUNICATION - HEALTHEAST (OUTPATIENT)
Dept: PHYSICAL MEDICINE AND REHAB | Facility: CLINIC | Age: 79
End: 2019-03-07

## 2019-03-07 DIAGNOSIS — F11.20 OPIOID TYPE DEPENDENCE, CONTINUOUS (H): ICD-10-CM

## 2019-03-07 DIAGNOSIS — G90.50 REFLEX SYMPATHETIC DYSTROPHY: ICD-10-CM

## 2019-03-07 DIAGNOSIS — M62.81 GENERALIZED MUSCLE WEAKNESS: ICD-10-CM

## 2019-03-07 DIAGNOSIS — F06.4 ANXIETY DISORDER DUE TO MEDICAL CONDITION: ICD-10-CM

## 2019-03-08 ENCOUNTER — RECORDS - HEALTHEAST (OUTPATIENT)
Dept: LAB | Facility: CLINIC | Age: 79
End: 2019-03-08

## 2019-03-08 ENCOUNTER — COMMUNICATION - HEALTHEAST (OUTPATIENT)
Dept: GERIATRICS | Facility: CLINIC | Age: 79
End: 2019-03-08

## 2019-03-08 LAB
ALBUMIN UR-MCNC: NEGATIVE MG/DL
APPEARANCE UR: CLEAR
BACTERIA #/AREA URNS HPF: ABNORMAL HPF
BILIRUB UR QL STRIP: NEGATIVE
COLOR UR AUTO: ABNORMAL
GLUCOSE UR STRIP-MCNC: NEGATIVE MG/DL
HGB UR QL STRIP: ABNORMAL
KETONES UR STRIP-MCNC: NEGATIVE MG/DL
LEUKOCYTE ESTERASE UR QL STRIP: ABNORMAL
NITRATE UR QL: NEGATIVE
PH UR STRIP: 6 [PH] (ref 4.5–8)
RBC #/AREA URNS AUTO: ABNORMAL HPF
SP GR UR STRIP: 1 (ref 1–1.03)
SQUAMOUS #/AREA URNS AUTO: ABNORMAL LPF
TRANS CELLS #/AREA URNS HPF: ABNORMAL LPF
UROBILINOGEN UR STRIP-ACNC: ABNORMAL
WBC #/AREA URNS AUTO: ABNORMAL HPF
WBC CLUMPS #/AREA URNS HPF: PRESENT /[HPF]

## 2019-03-09 LAB — BACTERIA SPEC CULT: NO GROWTH

## 2019-03-11 ENCOUNTER — COMMUNICATION - HEALTHEAST (OUTPATIENT)
Dept: GERIATRICS | Facility: CLINIC | Age: 79
End: 2019-03-11

## 2019-03-11 LAB
ANION GAP SERPL CALCULATED.3IONS-SCNC: 7 MMOL/L (ref 5–18)
BUN SERPL-MCNC: 19 MG/DL (ref 8–28)
CALCIUM SERPL-MCNC: 8 MG/DL (ref 8.5–10.5)
CHLORIDE BLD-SCNC: 111 MMOL/L (ref 98–107)
CO2 SERPL-SCNC: 22 MMOL/L (ref 22–31)
CREAT SERPL-MCNC: 1.4 MG/DL (ref 0.6–1.1)
GFR SERPL CREATININE-BSD FRML MDRD: 37 ML/MIN/1.73M2
GLUCOSE BLD-MCNC: 75 MG/DL (ref 70–125)
POTASSIUM BLD-SCNC: 4.2 MMOL/L (ref 3.5–5)
SODIUM SERPL-SCNC: 140 MMOL/L (ref 136–145)

## 2019-03-12 ENCOUNTER — OFFICE VISIT - HEALTHEAST (OUTPATIENT)
Dept: GERIATRICS | Facility: CLINIC | Age: 79
End: 2019-03-12

## 2019-03-12 DIAGNOSIS — R31.0 GROSS HEMATURIA: ICD-10-CM

## 2019-03-12 DIAGNOSIS — F06.30 MOOD DISORDER DUE TO MEDICAL CONDITION: ICD-10-CM

## 2019-03-12 DIAGNOSIS — D62 ANEMIA DUE TO BLOOD LOSS, ACUTE: ICD-10-CM

## 2019-03-12 DIAGNOSIS — F11.20 OPIOID TYPE DEPENDENCE, CONTINUOUS (H): ICD-10-CM

## 2019-03-13 ENCOUNTER — RECORDS - HEALTHEAST (OUTPATIENT)
Dept: LAB | Facility: CLINIC | Age: 79
End: 2019-03-13

## 2019-03-13 ENCOUNTER — COMMUNICATION - HEALTHEAST (OUTPATIENT)
Dept: GERIATRICS | Facility: CLINIC | Age: 79
End: 2019-03-13

## 2019-03-13 ENCOUNTER — OFFICE VISIT - HEALTHEAST (OUTPATIENT)
Dept: GERIATRICS | Facility: CLINIC | Age: 79
End: 2019-03-13

## 2019-03-13 ENCOUNTER — RECORDS - HEALTHEAST (OUTPATIENT)
Dept: ADMINISTRATIVE | Facility: OTHER | Age: 79
End: 2019-03-13

## 2019-03-13 DIAGNOSIS — Z86.718 HISTORY OF BLOOD CLOTS: ICD-10-CM

## 2019-03-13 DIAGNOSIS — N18.31 CHRONIC KIDNEY DISEASE (CKD) STAGE G3A/A1, MODERATELY DECREASED GLOMERULAR FILTRATION RATE (GFR) BETWEEN 45-59 ML/MIN/1.73 SQUARE METER AND ALBUMINURIA CREATININE RATIO LESS THAN 30 MG/G (H): ICD-10-CM

## 2019-03-13 LAB
ALBUMIN UR-MCNC: ABNORMAL MG/DL
APPEARANCE UR: CLEAR
BACTERIA #/AREA URNS HPF: ABNORMAL HPF
BILIRUB UR QL STRIP: NEGATIVE
COLOR UR AUTO: YELLOW
GLUCOSE UR STRIP-MCNC: NEGATIVE MG/DL
HGB BLD-MCNC: 8 G/DL (ref 12–16)
HGB UR QL STRIP: NEGATIVE
KETONES UR STRIP-MCNC: NEGATIVE MG/DL
LEUKOCYTE ESTERASE UR QL STRIP: ABNORMAL
NITRATE UR QL: NEGATIVE
PH UR STRIP: 7 [PH] (ref 4.5–8)
RBC #/AREA URNS AUTO: ABNORMAL HPF
SP GR UR STRIP: 1.01 (ref 1–1.03)
SQUAMOUS #/AREA URNS AUTO: ABNORMAL LPF
UROBILINOGEN UR STRIP-ACNC: ABNORMAL
WBC #/AREA URNS AUTO: ABNORMAL HPF
WBC CLUMPS #/AREA URNS HPF: PRESENT /[HPF]

## 2019-03-14 ENCOUNTER — COMMUNICATION - HEALTHEAST (OUTPATIENT)
Dept: FAMILY MEDICINE | Facility: CLINIC | Age: 79
End: 2019-03-14

## 2019-03-14 ENCOUNTER — OFFICE VISIT - HEALTHEAST (OUTPATIENT)
Dept: GERIATRICS | Facility: CLINIC | Age: 79
End: 2019-03-14

## 2019-03-14 DIAGNOSIS — F11.20 OPIOID TYPE DEPENDENCE, CONTINUOUS (H): ICD-10-CM

## 2019-03-14 DIAGNOSIS — D62 ANEMIA DUE TO BLOOD LOSS, ACUTE: ICD-10-CM

## 2019-03-14 DIAGNOSIS — Z86.718 HISTORY OF BLOOD CLOTS: ICD-10-CM

## 2019-03-14 DIAGNOSIS — N18.31 CHRONIC KIDNEY DISEASE (CKD) STAGE G3A/A1, MODERATELY DECREASED GLOMERULAR FILTRATION RATE (GFR) BETWEEN 45-59 ML/MIN/1.73 SQUARE METER AND ALBUMINURIA CREATININE RATIO LESS THAN 30 MG/G (H): ICD-10-CM

## 2019-03-14 DIAGNOSIS — M62.81 GENERALIZED MUSCLE WEAKNESS: ICD-10-CM

## 2019-03-15 ENCOUNTER — COMMUNICATION - HEALTHEAST (OUTPATIENT)
Dept: FAMILY MEDICINE | Facility: CLINIC | Age: 79
End: 2019-03-15

## 2019-03-15 ENCOUNTER — COMMUNICATION - HEALTHEAST (OUTPATIENT)
Dept: GERIATRICS | Facility: CLINIC | Age: 79
End: 2019-03-15

## 2019-03-15 ENCOUNTER — COMMUNICATION - HEALTHEAST (OUTPATIENT)
Dept: ANTICOAGULATION | Facility: CLINIC | Age: 79
End: 2019-03-15

## 2019-03-15 ENCOUNTER — AMBULATORY - HEALTHEAST (OUTPATIENT)
Dept: GERIATRICS | Facility: CLINIC | Age: 79
End: 2019-03-15

## 2019-03-15 DIAGNOSIS — I27.82 OTHER CHRONIC PULMONARY EMBOLISM WITHOUT ACUTE COR PULMONALE (H): ICD-10-CM

## 2019-03-16 LAB — BACTERIA SPEC CULT: ABNORMAL

## 2019-03-18 ENCOUNTER — COMMUNICATION - HEALTHEAST (OUTPATIENT)
Dept: FAMILY MEDICINE | Facility: CLINIC | Age: 79
End: 2019-03-18

## 2019-03-19 ENCOUNTER — COMMUNICATION - HEALTHEAST (OUTPATIENT)
Dept: FAMILY MEDICINE | Facility: CLINIC | Age: 79
End: 2019-03-19

## 2019-03-19 ENCOUNTER — OFFICE VISIT - HEALTHEAST (OUTPATIENT)
Dept: FAMILY MEDICINE | Facility: CLINIC | Age: 79
End: 2019-03-19

## 2019-03-19 DIAGNOSIS — R11.0 NAUSEA: ICD-10-CM

## 2019-03-19 DIAGNOSIS — F32.A ANXIETY AND DEPRESSION: ICD-10-CM

## 2019-03-19 DIAGNOSIS — G89.4 CHRONIC PAIN SYNDROME: ICD-10-CM

## 2019-03-19 DIAGNOSIS — I26.99 OTHER ACUTE PULMONARY EMBOLISM WITHOUT ACUTE COR PULMONALE (H): ICD-10-CM

## 2019-03-19 DIAGNOSIS — R63.0 POOR APPETITE: ICD-10-CM

## 2019-03-19 DIAGNOSIS — I10 ESSENTIAL HYPERTENSION: ICD-10-CM

## 2019-03-19 DIAGNOSIS — G47.00 INSOMNIA, UNSPECIFIED TYPE: ICD-10-CM

## 2019-03-19 DIAGNOSIS — F41.9 ANXIETY AND DEPRESSION: ICD-10-CM

## 2019-03-19 DIAGNOSIS — M62.81 GENERALIZED MUSCLE WEAKNESS: ICD-10-CM

## 2019-03-19 DIAGNOSIS — L30.9 DERMATITIS: ICD-10-CM

## 2019-03-19 DIAGNOSIS — E03.9 ACQUIRED HYPOTHYROIDISM: ICD-10-CM

## 2019-03-19 DIAGNOSIS — D62 ANEMIA DUE TO BLOOD LOSS, ACUTE: ICD-10-CM

## 2019-03-19 LAB
ALBUMIN SERPL-MCNC: 3 G/DL (ref 3.5–5)
ALP SERPL-CCNC: 95 U/L (ref 45–120)
ALT SERPL W P-5'-P-CCNC: 10 U/L (ref 0–45)
ANION GAP SERPL CALCULATED.3IONS-SCNC: 11 MMOL/L (ref 5–18)
AST SERPL W P-5'-P-CCNC: 14 U/L (ref 0–40)
BASOPHILS # BLD AUTO: 0 THOU/UL (ref 0–0.2)
BASOPHILS NFR BLD AUTO: 1 % (ref 0–2)
BILIRUB SERPL-MCNC: 0.4 MG/DL (ref 0–1)
BUN SERPL-MCNC: 11 MG/DL (ref 8–28)
CALCIUM SERPL-MCNC: 8.3 MG/DL (ref 8.5–10.5)
CHLORIDE BLD-SCNC: 106 MMOL/L (ref 98–107)
CO2 SERPL-SCNC: 17 MMOL/L (ref 22–31)
CREAT SERPL-MCNC: 1.38 MG/DL (ref 0.6–1.1)
EOSINOPHIL # BLD AUTO: 0.6 THOU/UL (ref 0–0.4)
EOSINOPHIL NFR BLD AUTO: 11 % (ref 0–6)
ERYTHROCYTE [DISTWIDTH] IN BLOOD BY AUTOMATED COUNT: 14 % (ref 11–14.5)
GFR SERPL CREATININE-BSD FRML MDRD: 37 ML/MIN/1.73M2
GLUCOSE BLD-MCNC: 120 MG/DL (ref 70–125)
HCT VFR BLD AUTO: 28.5 % (ref 35–47)
HGB BLD-MCNC: 9.2 G/DL (ref 12–16)
LYMPHOCYTES # BLD AUTO: 1.3 THOU/UL (ref 0.8–4.4)
LYMPHOCYTES NFR BLD AUTO: 24 % (ref 20–40)
MCH RBC QN AUTO: 29.5 PG (ref 27–34)
MCHC RBC AUTO-ENTMCNC: 32.2 G/DL (ref 32–36)
MCV RBC AUTO: 92 FL (ref 80–100)
MONOCYTES # BLD AUTO: 0.4 THOU/UL (ref 0–0.9)
MONOCYTES NFR BLD AUTO: 8 % (ref 2–10)
NEUTROPHILS # BLD AUTO: 3 THOU/UL (ref 2–7.7)
NEUTROPHILS NFR BLD AUTO: 56 % (ref 50–70)
PLATELET # BLD AUTO: 267 THOU/UL (ref 140–440)
PMV BLD AUTO: 6.8 FL (ref 7–10)
POTASSIUM BLD-SCNC: 3.4 MMOL/L (ref 3.5–5)
PROT SERPL-MCNC: 5.7 G/DL (ref 6–8)
RBC # BLD AUTO: 3.11 MILL/UL (ref 3.8–5.4)
SODIUM SERPL-SCNC: 134 MMOL/L (ref 136–145)
T4 FREE SERPL-MCNC: 1.2 NG/DL (ref 0.7–1.8)
TSH SERPL DL<=0.005 MIU/L-ACNC: 1.91 UIU/ML (ref 0.3–5)
WBC: 5.4 THOU/UL (ref 4–11)

## 2019-03-20 ENCOUNTER — COMMUNICATION - HEALTHEAST (OUTPATIENT)
Dept: ONCOLOGY | Facility: CLINIC | Age: 79
End: 2019-03-20

## 2019-03-22 ENCOUNTER — COMMUNICATION - HEALTHEAST (OUTPATIENT)
Dept: FAMILY MEDICINE | Facility: CLINIC | Age: 79
End: 2019-03-22

## 2019-03-25 ENCOUNTER — COMMUNICATION - HEALTHEAST (OUTPATIENT)
Dept: SCHEDULING | Facility: CLINIC | Age: 79
End: 2019-03-25

## 2019-03-25 ENCOUNTER — RECORDS - HEALTHEAST (OUTPATIENT)
Dept: ADMINISTRATIVE | Facility: OTHER | Age: 79
End: 2019-03-25

## 2019-03-25 ENCOUNTER — COMMUNICATION - HEALTHEAST (OUTPATIENT)
Dept: FAMILY MEDICINE | Facility: CLINIC | Age: 79
End: 2019-03-25

## 2019-03-25 DIAGNOSIS — M54.16 LUMBAR RADICULOPATHY: ICD-10-CM

## 2019-03-26 ENCOUNTER — COMMUNICATION - HEALTHEAST (OUTPATIENT)
Dept: FAMILY MEDICINE | Facility: CLINIC | Age: 79
End: 2019-03-26

## 2019-03-26 ENCOUNTER — RECORDS - HEALTHEAST (OUTPATIENT)
Dept: ADMINISTRATIVE | Facility: OTHER | Age: 79
End: 2019-03-26

## 2019-03-26 ENCOUNTER — OFFICE VISIT - HEALTHEAST (OUTPATIENT)
Dept: ONCOLOGY | Facility: CLINIC | Age: 79
End: 2019-03-26

## 2019-03-26 DIAGNOSIS — R30.0 DYSURIA: ICD-10-CM

## 2019-03-26 DIAGNOSIS — I26.99 PULMONARY EMBOLUS, RIGHT (H): ICD-10-CM

## 2019-03-26 DIAGNOSIS — N18.9 CHRONIC KIDNEY DISEASE, UNSPECIFIED CKD STAGE: ICD-10-CM

## 2019-03-26 DIAGNOSIS — I26.99 OTHER ACUTE PULMONARY EMBOLISM WITHOUT ACUTE COR PULMONALE (H): ICD-10-CM

## 2019-03-26 LAB
ALBUMIN UR-MCNC: NEGATIVE MG/DL
ANION GAP SERPL CALCULATED.3IONS-SCNC: 11 MMOL/L (ref 5–18)
APPEARANCE UR: CLEAR
BACTERIA #/AREA URNS HPF: ABNORMAL HPF
BILIRUB UR QL STRIP: NEGATIVE
BUN SERPL-MCNC: 11 MG/DL (ref 8–28)
CALCIUM SERPL-MCNC: 8.5 MG/DL (ref 8.5–10.5)
CHLORIDE BLD-SCNC: 105 MMOL/L (ref 98–107)
CO2 SERPL-SCNC: 20 MMOL/L (ref 22–31)
COLOR UR AUTO: YELLOW
CREAT SERPL-MCNC: 1.17 MG/DL (ref 0.6–1.1)
ERYTHROCYTE [DISTWIDTH] IN BLOOD BY AUTOMATED COUNT: 17 % (ref 11–14.5)
FERRITIN SERPL-MCNC: 609 NG/ML (ref 10–130)
GFR SERPL CREATININE-BSD FRML MDRD: 45 ML/MIN/1.73M2
GLUCOSE BLD-MCNC: 97 MG/DL (ref 70–125)
GLUCOSE UR STRIP-MCNC: NEGATIVE MG/DL
HCT VFR BLD AUTO: 29.5 % (ref 35–47)
HGB BLD-MCNC: 9.3 G/DL (ref 12–16)
HGB UR QL STRIP: ABNORMAL
IRON SATN MFR SERPL: 14 % (ref 20–50)
IRON SERPL-MCNC: 32 UG/DL (ref 42–175)
KETONES UR STRIP-MCNC: NEGATIVE MG/DL
LEUKOCYTE ESTERASE UR QL STRIP: ABNORMAL
MCH RBC QN AUTO: 30.3 PG (ref 27–34)
MCHC RBC AUTO-ENTMCNC: 31.5 G/DL (ref 32–36)
MCV RBC AUTO: 96 FL (ref 80–100)
NITRATE UR QL: NEGATIVE
PH UR STRIP: 6 [PH] (ref 4.5–8)
PLATELET # BLD AUTO: 170 THOU/UL (ref 140–440)
PMV BLD AUTO: 9.5 FL (ref 8.5–12.5)
POTASSIUM BLD-SCNC: 3.4 MMOL/L (ref 3.5–5)
RBC # BLD AUTO: 3.07 MILL/UL (ref 3.8–5.4)
RBC #/AREA URNS AUTO: ABNORMAL HPF
SODIUM SERPL-SCNC: 136 MMOL/L (ref 136–145)
SP GR UR STRIP: 1 (ref 1–1.03)
SQUAMOUS #/AREA URNS AUTO: ABNORMAL LPF
TIBC SERPL-MCNC: 222 UG/DL (ref 313–563)
TRANSFERRIN SERPL-MCNC: 177 MG/DL (ref 212–360)
UROBILINOGEN UR STRIP-ACNC: ABNORMAL
VIT B12 SERPL-MCNC: 345 PG/ML (ref 213–816)
WBC #/AREA URNS AUTO: ABNORMAL HPF
WBC: 5.2 THOU/UL (ref 4–11)

## 2019-03-26 ASSESSMENT — MIFFLIN-ST. JEOR: SCORE: 1036.42

## 2019-03-27 ENCOUNTER — RECORDS - HEALTHEAST (OUTPATIENT)
Dept: ADMINISTRATIVE | Facility: OTHER | Age: 79
End: 2019-03-27

## 2019-03-27 LAB — BACTERIA SPEC CULT: NO GROWTH

## 2019-03-28 ENCOUNTER — COMMUNICATION - HEALTHEAST (OUTPATIENT)
Dept: ONCOLOGY | Facility: CLINIC | Age: 79
End: 2019-03-28

## 2019-04-01 ENCOUNTER — RECORDS - HEALTHEAST (OUTPATIENT)
Dept: ADMINISTRATIVE | Facility: OTHER | Age: 79
End: 2019-04-01

## 2019-04-01 ENCOUNTER — COMMUNICATION - HEALTHEAST (OUTPATIENT)
Dept: ANTICOAGULATION | Facility: CLINIC | Age: 79
End: 2019-04-01

## 2019-04-01 ENCOUNTER — COMMUNICATION - HEALTHEAST (OUTPATIENT)
Dept: FAMILY MEDICINE | Facility: CLINIC | Age: 79
End: 2019-04-01

## 2019-04-01 DIAGNOSIS — I27.82 OTHER CHRONIC PULMONARY EMBOLISM WITHOUT ACUTE COR PULMONALE (H): ICD-10-CM

## 2019-04-01 DIAGNOSIS — Z86.718 HISTORY OF BLOOD CLOTS: ICD-10-CM

## 2019-04-01 DIAGNOSIS — D73.5 SPLENIC INFARCTION: ICD-10-CM

## 2019-04-01 DIAGNOSIS — M54.16 LUMBAR RADICULOPATHY: ICD-10-CM

## 2019-04-01 DIAGNOSIS — R19.7 DIARRHEA: ICD-10-CM

## 2019-04-02 ENCOUNTER — COMMUNICATION - HEALTHEAST (OUTPATIENT)
Dept: FAMILY MEDICINE | Facility: CLINIC | Age: 79
End: 2019-04-02

## 2019-04-02 ENCOUNTER — AMBULATORY - HEALTHEAST (OUTPATIENT)
Dept: ONCOLOGY | Facility: CLINIC | Age: 79
End: 2019-04-02

## 2019-04-02 ENCOUNTER — RECORDS - HEALTHEAST (OUTPATIENT)
Dept: ADMINISTRATIVE | Facility: OTHER | Age: 79
End: 2019-04-02

## 2019-04-02 DIAGNOSIS — D64.9 ANEMIA, UNSPECIFIED TYPE: ICD-10-CM

## 2019-04-05 ENCOUNTER — AMBULATORY - HEALTHEAST (OUTPATIENT)
Dept: ENDOCRINOLOGY | Facility: CLINIC | Age: 79
End: 2019-04-05

## 2019-04-05 ENCOUNTER — RECORDS - HEALTHEAST (OUTPATIENT)
Dept: ADMINISTRATIVE | Facility: OTHER | Age: 79
End: 2019-04-05

## 2019-04-05 ENCOUNTER — COMMUNICATION - HEALTHEAST (OUTPATIENT)
Dept: FAMILY MEDICINE | Facility: CLINIC | Age: 79
End: 2019-04-05

## 2019-04-05 ENCOUNTER — OFFICE VISIT - HEALTHEAST (OUTPATIENT)
Dept: FAMILY MEDICINE | Facility: CLINIC | Age: 79
End: 2019-04-05

## 2019-04-05 ENCOUNTER — COMMUNICATION - HEALTHEAST (OUTPATIENT)
Dept: PALLIATIVE MEDICINE | Facility: OTHER | Age: 79
End: 2019-04-05

## 2019-04-05 DIAGNOSIS — I27.82 OTHER CHRONIC PULMONARY EMBOLISM WITHOUT ACUTE COR PULMONALE (H): ICD-10-CM

## 2019-04-05 DIAGNOSIS — R35.0 URINARY FREQUENCY: ICD-10-CM

## 2019-04-05 DIAGNOSIS — N18.30 CHRONIC KIDNEY DISEASE, STAGE III (MODERATE) (H): ICD-10-CM

## 2019-04-05 DIAGNOSIS — G89.4 CHRONIC PAIN SYNDROME: ICD-10-CM

## 2019-04-05 DIAGNOSIS — D62 ANEMIA DUE TO BLOOD LOSS, ACUTE: ICD-10-CM

## 2019-04-05 DIAGNOSIS — R63.4 WEIGHT LOSS: ICD-10-CM

## 2019-04-05 DIAGNOSIS — L30.9 DERMATITIS: ICD-10-CM

## 2019-04-05 LAB
ALBUMIN SERPL-MCNC: 3.4 G/DL (ref 3.5–5)
ALBUMIN UR-MCNC: ABNORMAL MG/DL
ALP SERPL-CCNC: 79 U/L (ref 45–120)
ALT SERPL W P-5'-P-CCNC: 10 U/L (ref 0–45)
ANION GAP SERPL CALCULATED.3IONS-SCNC: 11 MMOL/L (ref 5–18)
APPEARANCE UR: CLEAR
AST SERPL W P-5'-P-CCNC: 18 U/L (ref 0–40)
BILIRUB SERPL-MCNC: 0.4 MG/DL (ref 0–1)
BILIRUB UR QL STRIP: ABNORMAL
BUN SERPL-MCNC: 16 MG/DL (ref 8–28)
CALCIUM SERPL-MCNC: 8.5 MG/DL (ref 8.5–10.5)
CHLORIDE BLD-SCNC: 108 MMOL/L (ref 98–107)
CO2 SERPL-SCNC: 16 MMOL/L (ref 22–31)
COLOR UR AUTO: YELLOW
CREAT SERPL-MCNC: 1.53 MG/DL (ref 0.6–1.1)
GFR SERPL CREATININE-BSD FRML MDRD: 33 ML/MIN/1.73M2
GLUCOSE BLD-MCNC: 186 MG/DL (ref 70–125)
GLUCOSE UR STRIP-MCNC: NEGATIVE MG/DL
HGB BLD-MCNC: 9.6 G/DL (ref 12–16)
HGB UR QL STRIP: ABNORMAL
INR PPP: 2.5 (ref 0.9–1.1)
KETONES UR STRIP-MCNC: NEGATIVE MG/DL
LEUKOCYTE ESTERASE UR QL STRIP: ABNORMAL
NITRATE UR QL: NEGATIVE
PH UR STRIP: 5.5 [PH] (ref 5–8)
POTASSIUM BLD-SCNC: 3.7 MMOL/L (ref 3.5–5)
PROT SERPL-MCNC: 6.3 G/DL (ref 6–8)
SODIUM SERPL-SCNC: 135 MMOL/L (ref 136–145)
SP GR UR STRIP: >=1.03 (ref 1–1.03)
UROBILINOGEN UR STRIP-ACNC: ABNORMAL

## 2019-04-06 LAB — BACTERIA SPEC CULT: NORMAL

## 2019-04-08 ENCOUNTER — COMMUNICATION - HEALTHEAST (OUTPATIENT)
Dept: SCHEDULING | Facility: CLINIC | Age: 79
End: 2019-04-08

## 2019-04-08 ENCOUNTER — COMMUNICATION - HEALTHEAST (OUTPATIENT)
Dept: FAMILY MEDICINE | Facility: CLINIC | Age: 79
End: 2019-04-08

## 2019-04-09 ENCOUNTER — COMMUNICATION - HEALTHEAST (OUTPATIENT)
Dept: SCHEDULING | Facility: CLINIC | Age: 79
End: 2019-04-09

## 2019-04-09 ENCOUNTER — COMMUNICATION - HEALTHEAST (OUTPATIENT)
Dept: FAMILY MEDICINE | Facility: CLINIC | Age: 79
End: 2019-04-09

## 2019-04-09 DIAGNOSIS — I27.82 OTHER CHRONIC PULMONARY EMBOLISM WITHOUT ACUTE COR PULMONALE (H): ICD-10-CM

## 2019-04-09 LAB — INR PPP: 3 (ref 0.9–1.1)

## 2019-04-10 ENCOUNTER — COMMUNICATION - HEALTHEAST (OUTPATIENT)
Dept: FAMILY MEDICINE | Facility: CLINIC | Age: 79
End: 2019-04-10

## 2019-04-11 ENCOUNTER — COMMUNICATION - HEALTHEAST (OUTPATIENT)
Dept: FAMILY MEDICINE | Facility: CLINIC | Age: 79
End: 2019-04-11

## 2019-04-12 ENCOUNTER — COMMUNICATION - HEALTHEAST (OUTPATIENT)
Dept: SCHEDULING | Facility: CLINIC | Age: 79
End: 2019-04-12

## 2019-04-12 ENCOUNTER — OFFICE VISIT - HEALTHEAST (OUTPATIENT)
Dept: FAMILY MEDICINE | Facility: CLINIC | Age: 79
End: 2019-04-12

## 2019-04-12 ENCOUNTER — RECORDS - HEALTHEAST (OUTPATIENT)
Dept: ADMINISTRATIVE | Facility: OTHER | Age: 79
End: 2019-04-12

## 2019-04-12 ENCOUNTER — COMMUNICATION - HEALTHEAST (OUTPATIENT)
Dept: FAMILY MEDICINE | Facility: CLINIC | Age: 79
End: 2019-04-12

## 2019-04-12 DIAGNOSIS — Z86.718 HISTORY OF BLOOD CLOTS: ICD-10-CM

## 2019-04-12 DIAGNOSIS — F06.4 ANXIETY DISORDER DUE TO MEDICAL CONDITION: ICD-10-CM

## 2019-04-12 DIAGNOSIS — N18.31 CHRONIC KIDNEY DISEASE (CKD) STAGE G3A/A1, MODERATELY DECREASED GLOMERULAR FILTRATION RATE (GFR) BETWEEN 45-59 ML/MIN/1.73 SQUARE METER AND ALBUMINURIA CREATININE RATIO LESS THAN 30 MG/G (H): ICD-10-CM

## 2019-04-12 DIAGNOSIS — I27.82 OTHER CHRONIC PULMONARY EMBOLISM WITHOUT ACUTE COR PULMONALE (H): ICD-10-CM

## 2019-04-12 DIAGNOSIS — D64.9 ANEMIA, UNSPECIFIED TYPE: ICD-10-CM

## 2019-04-12 DIAGNOSIS — I26.99 OTHER ACUTE PULMONARY EMBOLISM WITHOUT ACUTE COR PULMONALE (H): ICD-10-CM

## 2019-04-12 DIAGNOSIS — D62 ANEMIA DUE TO BLOOD LOSS, ACUTE: ICD-10-CM

## 2019-04-12 LAB
HGB BLD-MCNC: 9.3 G/DL (ref 12–16)
INR PPP: 2.9 (ref 0.9–1.1)

## 2019-04-12 ASSESSMENT — MIFFLIN-ST. JEOR: SCORE: 1008.75

## 2019-04-15 ENCOUNTER — COMMUNICATION - HEALTHEAST (OUTPATIENT)
Dept: FAMILY MEDICINE | Facility: CLINIC | Age: 79
End: 2019-04-15

## 2019-04-15 ENCOUNTER — RECORDS - HEALTHEAST (OUTPATIENT)
Dept: ADMINISTRATIVE | Facility: OTHER | Age: 79
End: 2019-04-15

## 2019-04-16 ENCOUNTER — RECORDS - HEALTHEAST (OUTPATIENT)
Dept: ADMINISTRATIVE | Facility: OTHER | Age: 79
End: 2019-04-16

## 2019-04-17 ENCOUNTER — COMMUNICATION - HEALTHEAST (OUTPATIENT)
Dept: FAMILY MEDICINE | Facility: CLINIC | Age: 79
End: 2019-04-17

## 2019-04-17 ENCOUNTER — COMMUNICATION - HEALTHEAST (OUTPATIENT)
Dept: SCHEDULING | Facility: CLINIC | Age: 79
End: 2019-04-17

## 2019-04-17 DIAGNOSIS — I27.82 OTHER CHRONIC PULMONARY EMBOLISM WITHOUT ACUTE COR PULMONALE (H): ICD-10-CM

## 2019-04-17 DIAGNOSIS — D62 ANEMIA DUE TO BLOOD LOSS, ACUTE: ICD-10-CM

## 2019-04-17 LAB — INR PPP: 3.7 (ref 0.9–1.1)

## 2019-04-18 ENCOUNTER — COMMUNICATION - HEALTHEAST (OUTPATIENT)
Dept: FAMILY MEDICINE | Facility: CLINIC | Age: 79
End: 2019-04-18

## 2019-04-19 ENCOUNTER — HOSPITAL ENCOUNTER (OUTPATIENT)
Dept: PALLIATIVE MEDICINE | Facility: OTHER | Age: 79
Discharge: HOME | End: 2019-04-19
Attending: ANESTHESIOLOGY

## 2019-04-19 ENCOUNTER — COMMUNICATION - HEALTHEAST (OUTPATIENT)
Dept: PALLIATIVE MEDICINE | Facility: OTHER | Age: 79
End: 2019-04-19

## 2019-04-19 DIAGNOSIS — G89.4 CHRONIC PAIN SYNDROME: ICD-10-CM

## 2019-04-19 DIAGNOSIS — G90.511 COMPLEX REGIONAL PAIN SYNDROME TYPE 1 OF RIGHT UPPER EXTREMITY: ICD-10-CM

## 2019-04-19 ASSESSMENT — MIFFLIN-ST. JEOR: SCORE: 1004.78

## 2019-04-21 LAB
6MAM UR QL: NOT DETECTED
7AMINOCLONAZEPAM UR QL: NOT DETECTED
A-OH ALPRAZ UR QL: NOT DETECTED
ALPRAZ UR QL: NOT DETECTED
AMPHET UR QL SCN: NOT DETECTED
BARBITURATES UR QL: NOT DETECTED
BUPRENORPHINE UR QL: NOT DETECTED
BZE UR QL: NOT DETECTED
CARBOXYTHC UR QL: NOT DETECTED
CARISOPRODOL UR QL: NOT DETECTED
CLONAZEPAM UR QL: NOT DETECTED
CODEINE UR QL: NOT DETECTED
CREAT UR-MCNC: 134.2 MG/DL (ref 20–400)
DIAZEPAM UR QL: NOT DETECTED
ETHYL GLUCURONIDE UR QL: NOT DETECTED
FENTANYL UR QL: PRESENT
HYDROCODONE UR QL: NOT DETECTED
HYDROMORPHONE UR QL: NOT DETECTED
LORAZEPAM UR QL: NOT DETECTED
MDA UR QL: NOT DETECTED
MDEA UR QL: NOT DETECTED
MDMA UR QL: NOT DETECTED
ME-PHENIDATE UR QL: NOT DETECTED
MEPERIDINE UR QL: NOT DETECTED
METHADONE UR QL: NOT DETECTED
METHAMPHET UR QL: NOT DETECTED
MIDAZOLAM UR QL SCN: NOT DETECTED
MORPHINE UR QL: PRESENT
NORBUPRENORPHINE UR QL CFM: NOT DETECTED
NORDIAZEPAM UR QL: NOT DETECTED
NORFENTANYL UR QL: PRESENT
NORHYDROCODONE UR QL CFM: NOT DETECTED
NOROXYCODONE UR QL CFM: NOT DETECTED
NOROXYMORPHONE UR QL SCN: NOT DETECTED
OXAZEPAM UR QL: NOT DETECTED
OXYCODONE UR QL: NOT DETECTED
OXYMORPHONE UR QL: NOT DETECTED
PATHOLOGY STUDY: NORMAL
PCP UR QL: NOT DETECTED
PHENTERMINE UR QL: NOT DETECTED
PROPOXYPH UR QL: NOT DETECTED
SERVICE CMNT-IMP: NORMAL
TAPENTADOL UR QL SCN: NOT DETECTED
TAPENTADOL UR QL SCN: NOT DETECTED
TEMAZEPAM UR QL: NOT DETECTED
TRAMADOL UR QL: NOT DETECTED
ZOLPIDEM UR QL: NOT DETECTED

## 2019-04-22 ENCOUNTER — COMMUNICATION - HEALTHEAST (OUTPATIENT)
Dept: FAMILY MEDICINE | Facility: CLINIC | Age: 79
End: 2019-04-22

## 2019-04-22 DIAGNOSIS — I27.82 OTHER CHRONIC PULMONARY EMBOLISM WITHOUT ACUTE COR PULMONALE (H): ICD-10-CM

## 2019-04-22 LAB — INR PPP: 2.5 (ref 0.9–1.1)

## 2019-04-23 ENCOUNTER — RECORDS - HEALTHEAST (OUTPATIENT)
Dept: ADMINISTRATIVE | Facility: OTHER | Age: 79
End: 2019-04-23

## 2019-04-23 ENCOUNTER — COMMUNICATION - HEALTHEAST (OUTPATIENT)
Dept: FAMILY MEDICINE | Facility: CLINIC | Age: 79
End: 2019-04-23

## 2019-04-24 ENCOUNTER — AMBULATORY - HEALTHEAST (OUTPATIENT)
Dept: LAB | Facility: CLINIC | Age: 79
End: 2019-04-24

## 2019-04-24 DIAGNOSIS — I26.99 PULMONARY EMBOLUS, RIGHT (H): ICD-10-CM

## 2019-04-24 DIAGNOSIS — D62 ANEMIA DUE TO BLOOD LOSS, ACUTE: ICD-10-CM

## 2019-04-24 DIAGNOSIS — E78.2 MIXED HYPERLIPIDEMIA: ICD-10-CM

## 2019-04-24 LAB
ALBUMIN SERPL-MCNC: 3.6 G/DL (ref 3.5–5)
ALP SERPL-CCNC: 56 U/L (ref 45–120)
ALT SERPL W P-5'-P-CCNC: <9 U/L (ref 0–45)
ANION GAP SERPL CALCULATED.3IONS-SCNC: 9 MMOL/L (ref 5–18)
AST SERPL W P-5'-P-CCNC: 14 U/L (ref 0–40)
BILIRUB SERPL-MCNC: 0.3 MG/DL (ref 0–1)
BUN SERPL-MCNC: 14 MG/DL (ref 8–28)
CALCIUM SERPL-MCNC: 8.7 MG/DL (ref 8.5–10.5)
CHLORIDE BLD-SCNC: 110 MMOL/L (ref 98–107)
CO2 SERPL-SCNC: 18 MMOL/L (ref 22–31)
CREAT SERPL-MCNC: 1.12 MG/DL (ref 0.6–1.1)
GFR SERPL CREATININE-BSD FRML MDRD: 47 ML/MIN/1.73M2
GLUCOSE BLD-MCNC: 105 MG/DL (ref 70–125)
HGB BLD-MCNC: 11.2 G/DL (ref 12–16)
POTASSIUM BLD-SCNC: 3.8 MMOL/L (ref 3.5–5)
PROT SERPL-MCNC: 6.3 G/DL (ref 6–8)
SODIUM SERPL-SCNC: 137 MMOL/L (ref 136–145)

## 2019-04-25 ENCOUNTER — COMMUNICATION - HEALTHEAST (OUTPATIENT)
Dept: FAMILY MEDICINE | Facility: CLINIC | Age: 79
End: 2019-04-25

## 2019-04-29 ENCOUNTER — COMMUNICATION - HEALTHEAST (OUTPATIENT)
Dept: FAMILY MEDICINE | Facility: CLINIC | Age: 79
End: 2019-04-29

## 2019-04-29 ENCOUNTER — COMMUNICATION - HEALTHEAST (OUTPATIENT)
Dept: PALLIATIVE MEDICINE | Facility: OTHER | Age: 79
End: 2019-04-29

## 2019-04-29 DIAGNOSIS — I27.82 OTHER CHRONIC PULMONARY EMBOLISM WITHOUT ACUTE COR PULMONALE (H): ICD-10-CM

## 2019-04-29 DIAGNOSIS — G89.4 CHRONIC PAIN SYNDROME: ICD-10-CM

## 2019-04-29 LAB — INR PPP: 1.8 (ref 0.9–1.1)

## 2019-04-30 ENCOUNTER — COMMUNICATION - HEALTHEAST (OUTPATIENT)
Dept: FAMILY MEDICINE | Facility: CLINIC | Age: 79
End: 2019-04-30

## 2019-04-30 ENCOUNTER — RECORDS - HEALTHEAST (OUTPATIENT)
Dept: ADMINISTRATIVE | Facility: OTHER | Age: 79
End: 2019-04-30

## 2019-05-01 ENCOUNTER — RECORDS - HEALTHEAST (OUTPATIENT)
Dept: ADMINISTRATIVE | Facility: OTHER | Age: 79
End: 2019-05-01

## 2019-05-03 ENCOUNTER — COMMUNICATION - HEALTHEAST (OUTPATIENT)
Dept: PALLIATIVE MEDICINE | Facility: OTHER | Age: 79
End: 2019-05-03

## 2019-05-06 ENCOUNTER — RECORDS - HEALTHEAST (OUTPATIENT)
Dept: ADMINISTRATIVE | Facility: OTHER | Age: 79
End: 2019-05-06

## 2019-05-06 ENCOUNTER — COMMUNICATION - HEALTHEAST (OUTPATIENT)
Dept: FAMILY MEDICINE | Facility: CLINIC | Age: 79
End: 2019-05-06

## 2019-05-06 DIAGNOSIS — I27.82 OTHER CHRONIC PULMONARY EMBOLISM WITHOUT ACUTE COR PULMONALE (H): ICD-10-CM

## 2019-05-06 LAB — INR PPP: 1.8 (ref 0.9–1.1)

## 2019-05-07 ENCOUNTER — OFFICE VISIT - HEALTHEAST (OUTPATIENT)
Dept: ONCOLOGY | Facility: CLINIC | Age: 79
End: 2019-05-07

## 2019-05-07 DIAGNOSIS — I26.99 PULMONARY EMBOLUS, RIGHT (H): ICD-10-CM

## 2019-05-07 DIAGNOSIS — I26.99 OTHER ACUTE PULMONARY EMBOLISM WITHOUT ACUTE COR PULMONALE (H): ICD-10-CM

## 2019-05-08 ENCOUNTER — RECORDS - HEALTHEAST (OUTPATIENT)
Dept: ADMINISTRATIVE | Facility: OTHER | Age: 79
End: 2019-05-08

## 2019-05-10 ENCOUNTER — OFFICE VISIT - HEALTHEAST (OUTPATIENT)
Dept: FAMILY MEDICINE | Facility: CLINIC | Age: 79
End: 2019-05-10

## 2019-05-10 DIAGNOSIS — D62 ANEMIA DUE TO BLOOD LOSS, ACUTE: ICD-10-CM

## 2019-05-10 DIAGNOSIS — N18.31 CHRONIC KIDNEY DISEASE (CKD) STAGE G3A/A1, MODERATELY DECREASED GLOMERULAR FILTRATION RATE (GFR) BETWEEN 45-59 ML/MIN/1.73 SQUARE METER AND ALBUMINURIA CREATININE RATIO LESS THAN 30 MG/G (H): ICD-10-CM

## 2019-05-10 DIAGNOSIS — R63.4 WEIGHT LOSS: ICD-10-CM

## 2019-05-10 DIAGNOSIS — J30.2 SEASONAL ALLERGIC RHINITIS, UNSPECIFIED TRIGGER: ICD-10-CM

## 2019-05-10 DIAGNOSIS — G44.009 CLUSTER HEADACHES: ICD-10-CM

## 2019-05-10 LAB
ANION GAP SERPL CALCULATED.3IONS-SCNC: 11 MMOL/L (ref 5–18)
BUN SERPL-MCNC: 18 MG/DL (ref 8–28)
CALCIUM SERPL-MCNC: 8.8 MG/DL (ref 8.5–10.5)
CHLORIDE BLD-SCNC: 107 MMOL/L (ref 98–107)
CO2 SERPL-SCNC: 20 MMOL/L (ref 22–31)
CREAT SERPL-MCNC: 1.27 MG/DL (ref 0.6–1.1)
ERYTHROCYTE [DISTWIDTH] IN BLOOD BY AUTOMATED COUNT: 14 % (ref 11–14.5)
FERRITIN SERPL-MCNC: 247 NG/ML (ref 10–130)
GFR SERPL CREATININE-BSD FRML MDRD: 41 ML/MIN/1.73M2
GLUCOSE BLD-MCNC: 143 MG/DL (ref 70–125)
HCT VFR BLD AUTO: 35.3 % (ref 35–47)
HGB BLD-MCNC: 11.4 G/DL (ref 12–16)
IRON SATN MFR SERPL: 18 % (ref 20–50)
IRON SERPL-MCNC: 39 UG/DL (ref 42–175)
MCH RBC QN AUTO: 31.5 PG (ref 27–34)
MCHC RBC AUTO-ENTMCNC: 32.4 G/DL (ref 32–36)
MCV RBC AUTO: 97 FL (ref 80–100)
PLATELET # BLD AUTO: 148 THOU/UL (ref 140–440)
PMV BLD AUTO: 7.3 FL (ref 7–10)
POTASSIUM BLD-SCNC: 3.6 MMOL/L (ref 3.5–5)
RBC # BLD AUTO: 3.63 MILL/UL (ref 3.8–5.4)
SODIUM SERPL-SCNC: 138 MMOL/L (ref 136–145)
TIBC SERPL-MCNC: 219 UG/DL (ref 313–563)
TRANSFERRIN SERPL-MCNC: 175 MG/DL (ref 212–360)
WBC: 3.3 THOU/UL (ref 4–11)

## 2019-05-10 ASSESSMENT — MIFFLIN-ST. JEOR: SCORE: 1000.25

## 2019-05-13 ENCOUNTER — COMMUNICATION - HEALTHEAST (OUTPATIENT)
Dept: SCHEDULING | Facility: CLINIC | Age: 79
End: 2019-05-13

## 2019-05-13 ENCOUNTER — COMMUNICATION - HEALTHEAST (OUTPATIENT)
Dept: FAMILY MEDICINE | Facility: CLINIC | Age: 79
End: 2019-05-13

## 2019-05-13 DIAGNOSIS — I27.82 OTHER CHRONIC PULMONARY EMBOLISM WITHOUT ACUTE COR PULMONALE (H): ICD-10-CM

## 2019-05-13 LAB — INR PPP: 1.7 (ref 0.9–1.1)

## 2019-05-14 ENCOUNTER — OFFICE VISIT - HEALTHEAST (OUTPATIENT)
Dept: PHYSICAL MEDICINE AND REHAB | Facility: REHABILITATION | Age: 79
End: 2019-05-14

## 2019-05-14 DIAGNOSIS — R53.81 PHYSICAL DECONDITIONING: ICD-10-CM

## 2019-05-14 DIAGNOSIS — M99.01 SOMATIC DYSFUNCTION OF CERVICAL REGION: ICD-10-CM

## 2019-05-14 DIAGNOSIS — G89.29 CHRONIC PAIN OF BOTH KNEES: ICD-10-CM

## 2019-05-14 DIAGNOSIS — M99.03 SOMATIC DYSFUNCTION OF LUMBAR REGION: ICD-10-CM

## 2019-05-14 DIAGNOSIS — M99.08 SOMATIC DYSFUNCTION OF RIB REGION: ICD-10-CM

## 2019-05-14 DIAGNOSIS — M99.02 SOMATIC DYSFUNCTION OF THORACIC REGION: ICD-10-CM

## 2019-05-14 DIAGNOSIS — M99.05 SOMATIC DYSFUNCTION OF PELVIS REGION: ICD-10-CM

## 2019-05-14 DIAGNOSIS — M99.04 SOMATIC DYSFUNCTION OF SACROILIAC JOINT: ICD-10-CM

## 2019-05-14 DIAGNOSIS — M25.562 CHRONIC PAIN OF BOTH KNEES: ICD-10-CM

## 2019-05-14 DIAGNOSIS — M25.561 CHRONIC PAIN OF BOTH KNEES: ICD-10-CM

## 2019-05-14 DIAGNOSIS — M99.07 SOMATIC DYSFUNCTION OF UPPER EXTREMITY: ICD-10-CM

## 2019-05-14 DIAGNOSIS — G89.4 CHRONIC PAIN SYNDROME: ICD-10-CM

## 2019-05-14 DIAGNOSIS — M99.00 SOMATIC DYSFUNCTION OF HEAD REGION: ICD-10-CM

## 2019-05-14 DIAGNOSIS — M99.06 SOMATIC DYSFUNCTION OF LOWER EXTREMITY: ICD-10-CM

## 2019-05-14 ASSESSMENT — MIFFLIN-ST. JEOR: SCORE: 1013.85

## 2019-05-15 ENCOUNTER — AMBULATORY - HEALTHEAST (OUTPATIENT)
Dept: ONCOLOGY | Facility: CLINIC | Age: 79
End: 2019-05-15

## 2019-05-15 DIAGNOSIS — I26.99 OTHER ACUTE PULMONARY EMBOLISM WITHOUT ACUTE COR PULMONALE (H): ICD-10-CM

## 2019-05-15 DIAGNOSIS — I26.99 PULMONARY EMBOLUS, RIGHT (H): ICD-10-CM

## 2019-05-19 ENCOUNTER — RECORDS - HEALTHEAST (OUTPATIENT)
Dept: ADMINISTRATIVE | Facility: OTHER | Age: 79
End: 2019-05-19

## 2019-05-20 ENCOUNTER — COMMUNICATION - HEALTHEAST (OUTPATIENT)
Dept: FAMILY MEDICINE | Facility: CLINIC | Age: 79
End: 2019-05-20

## 2019-05-20 DIAGNOSIS — I27.82 OTHER CHRONIC PULMONARY EMBOLISM WITHOUT ACUTE COR PULMONALE (H): ICD-10-CM

## 2019-05-20 LAB — INR PPP: 1.9 (ref 0.9–1.1)

## 2019-05-21 ENCOUNTER — COMMUNICATION - HEALTHEAST (OUTPATIENT)
Dept: FAMILY MEDICINE | Facility: CLINIC | Age: 79
End: 2019-05-21

## 2019-05-23 ENCOUNTER — COMMUNICATION - HEALTHEAST (OUTPATIENT)
Dept: FAMILY MEDICINE | Facility: CLINIC | Age: 79
End: 2019-05-23

## 2019-05-24 ENCOUNTER — RECORDS - HEALTHEAST (OUTPATIENT)
Dept: ADMINISTRATIVE | Facility: OTHER | Age: 79
End: 2019-05-24

## 2019-05-28 ENCOUNTER — COMMUNICATION - HEALTHEAST (OUTPATIENT)
Dept: FAMILY MEDICINE | Facility: CLINIC | Age: 79
End: 2019-05-28

## 2019-05-28 ENCOUNTER — OFFICE VISIT - HEALTHEAST (OUTPATIENT)
Dept: PHYSICAL MEDICINE AND REHAB | Facility: REHABILITATION | Age: 79
End: 2019-05-28

## 2019-05-28 DIAGNOSIS — M99.03 SOMATIC DYSFUNCTION OF LUMBAR REGION: ICD-10-CM

## 2019-05-28 DIAGNOSIS — M99.04 SOMATIC DYSFUNCTION OF SACROILIAC JOINT: ICD-10-CM

## 2019-05-28 DIAGNOSIS — M25.561 CHRONIC PAIN OF BOTH KNEES: ICD-10-CM

## 2019-05-28 DIAGNOSIS — M99.06 SOMATIC DYSFUNCTION OF LOWER EXTREMITY: ICD-10-CM

## 2019-05-28 DIAGNOSIS — I27.82 OTHER CHRONIC PULMONARY EMBOLISM WITHOUT ACUTE COR PULMONALE (H): ICD-10-CM

## 2019-05-28 DIAGNOSIS — M99.05 SOMATIC DYSFUNCTION OF PELVIS REGION: ICD-10-CM

## 2019-05-28 DIAGNOSIS — M99.08 SOMATIC DYSFUNCTION OF RIB REGION: ICD-10-CM

## 2019-05-28 DIAGNOSIS — G89.29 CHRONIC PAIN OF BOTH KNEES: ICD-10-CM

## 2019-05-28 DIAGNOSIS — M99.01 SOMATIC DYSFUNCTION OF CERVICAL REGION: ICD-10-CM

## 2019-05-28 DIAGNOSIS — M25.562 CHRONIC PAIN OF BOTH KNEES: ICD-10-CM

## 2019-05-28 DIAGNOSIS — M99.07 SOMATIC DYSFUNCTION OF UPPER EXTREMITY: ICD-10-CM

## 2019-05-28 DIAGNOSIS — M99.00 SOMATIC DYSFUNCTION OF HEAD REGION: ICD-10-CM

## 2019-05-28 DIAGNOSIS — M99.02 SOMATIC DYSFUNCTION OF THORACIC REGION: ICD-10-CM

## 2019-05-28 LAB — INR PPP: 1.8 (ref 0.9–1.1)

## 2019-05-28 ASSESSMENT — MIFFLIN-ST. JEOR: SCORE: 986.64

## 2019-05-29 ENCOUNTER — COMMUNICATION - HEALTHEAST (OUTPATIENT)
Dept: FAMILY MEDICINE | Facility: CLINIC | Age: 79
End: 2019-05-29

## 2019-05-30 ENCOUNTER — COMMUNICATION - HEALTHEAST (OUTPATIENT)
Dept: FAMILY MEDICINE | Facility: CLINIC | Age: 79
End: 2019-05-30

## 2019-05-31 ENCOUNTER — HOSPITAL ENCOUNTER (OUTPATIENT)
Dept: PALLIATIVE MEDICINE | Facility: OTHER | Age: 79
Discharge: HOME | End: 2019-05-31
Attending: ANESTHESIOLOGY

## 2019-05-31 ENCOUNTER — RECORDS - HEALTHEAST (OUTPATIENT)
Dept: ADMINISTRATIVE | Facility: OTHER | Age: 79
End: 2019-05-31

## 2019-05-31 DIAGNOSIS — G89.4 CHRONIC PAIN SYNDROME: ICD-10-CM

## 2019-05-31 DIAGNOSIS — G90.511 COMPLEX REGIONAL PAIN SYNDROME TYPE 1 OF RIGHT UPPER EXTREMITY: ICD-10-CM

## 2019-05-31 ASSESSMENT — MIFFLIN-ST. JEOR: SCORE: 986.64

## 2019-06-03 ENCOUNTER — COMMUNICATION - HEALTHEAST (OUTPATIENT)
Dept: FAMILY MEDICINE | Facility: CLINIC | Age: 79
End: 2019-06-03

## 2019-06-03 ENCOUNTER — COMMUNICATION - HEALTHEAST (OUTPATIENT)
Dept: ONCOLOGY | Facility: CLINIC | Age: 79
End: 2019-06-03

## 2019-06-03 ENCOUNTER — RECORDS - HEALTHEAST (OUTPATIENT)
Dept: ADMINISTRATIVE | Facility: OTHER | Age: 79
End: 2019-06-03

## 2019-06-03 DIAGNOSIS — I27.82 OTHER CHRONIC PULMONARY EMBOLISM WITHOUT ACUTE COR PULMONALE (H): ICD-10-CM

## 2019-06-03 LAB — INR PPP: 1.3 (ref 0.9–1.1)

## 2019-06-04 ENCOUNTER — HOSPITAL ENCOUNTER (OUTPATIENT)
Dept: INTERVENTIONAL RADIOLOGY/VASCULAR | Facility: HOSPITAL | Age: 79
Setting detail: RADIATION/ONCOLOGY SERIES
Discharge: STILL ADMITTED OR ADMINISTRATIVE | End: 2019-06-04
Attending: INTERNAL MEDICINE

## 2019-06-10 ENCOUNTER — OFFICE VISIT - HEALTHEAST (OUTPATIENT)
Dept: FAMILY MEDICINE | Facility: CLINIC | Age: 79
End: 2019-06-10

## 2019-06-10 ENCOUNTER — COMMUNICATION - HEALTHEAST (OUTPATIENT)
Dept: ANTICOAGULATION | Facility: CLINIC | Age: 79
End: 2019-06-10

## 2019-06-10 ENCOUNTER — COMMUNICATION - HEALTHEAST (OUTPATIENT)
Dept: FAMILY MEDICINE | Facility: CLINIC | Age: 79
End: 2019-06-10

## 2019-06-10 DIAGNOSIS — I26.99 PULMONARY EMBOLUS, RIGHT (H): ICD-10-CM

## 2019-06-10 DIAGNOSIS — N18.31 CHRONIC KIDNEY DISEASE (CKD) STAGE G3A/A1, MODERATELY DECREASED GLOMERULAR FILTRATION RATE (GFR) BETWEEN 45-59 ML/MIN/1.73 SQUARE METER AND ALBUMINURIA CREATININE RATIO LESS THAN 30 MG/G (H): ICD-10-CM

## 2019-06-10 DIAGNOSIS — E78.2 MIXED HYPERLIPIDEMIA: ICD-10-CM

## 2019-06-10 DIAGNOSIS — I26.99 PULMONARY EMBOLISM (H): ICD-10-CM

## 2019-06-10 DIAGNOSIS — I27.82 OTHER CHRONIC PULMONARY EMBOLISM WITHOUT ACUTE COR PULMONALE (H): ICD-10-CM

## 2019-06-10 DIAGNOSIS — G44.009 CLUSTER HEADACHE, NOT INTRACTABLE, UNSPECIFIED CHRONICITY PATTERN: ICD-10-CM

## 2019-06-10 DIAGNOSIS — D62 ANEMIA DUE TO BLOOD LOSS, ACUTE: ICD-10-CM

## 2019-06-10 DIAGNOSIS — L65.9 HAIR LOSS: ICD-10-CM

## 2019-06-10 LAB
ALBUMIN SERPL-MCNC: 3.8 G/DL (ref 3.5–5)
ALP SERPL-CCNC: 41 U/L (ref 45–120)
ALT SERPL W P-5'-P-CCNC: <9 U/L (ref 0–45)
ANION GAP SERPL CALCULATED.3IONS-SCNC: 7 MMOL/L (ref 5–18)
AST SERPL W P-5'-P-CCNC: 15 U/L (ref 0–40)
BASOPHILS # BLD AUTO: 0 THOU/UL (ref 0–0.2)
BASOPHILS NFR BLD AUTO: 0 % (ref 0–2)
BILIRUB SERPL-MCNC: 0.4 MG/DL (ref 0–1)
BUN SERPL-MCNC: 17 MG/DL (ref 8–28)
CALCIUM SERPL-MCNC: 8.9 MG/DL (ref 8.5–10.5)
CHLORIDE BLD-SCNC: 107 MMOL/L (ref 98–107)
CHOLEST SERPL-MCNC: 183 MG/DL
CO2 SERPL-SCNC: 21 MMOL/L (ref 22–31)
CREAT SERPL-MCNC: 1.31 MG/DL (ref 0.6–1.1)
EOSINOPHIL # BLD AUTO: 0.1 THOU/UL (ref 0–0.4)
EOSINOPHIL NFR BLD AUTO: 2 % (ref 0–6)
ERYTHROCYTE [DISTWIDTH] IN BLOOD BY AUTOMATED COUNT: 13 % (ref 11–14.5)
FASTING STATUS PATIENT QL REPORTED: NORMAL
FERRITIN SERPL-MCNC: 187 NG/ML (ref 10–130)
GFR SERPL CREATININE-BSD FRML MDRD: 39 ML/MIN/1.73M2
GLUCOSE BLD-MCNC: 98 MG/DL (ref 70–125)
HCT VFR BLD AUTO: 34.3 % (ref 35–47)
HDLC SERPL-MCNC: 69 MG/DL
HGB BLD-MCNC: 11.4 G/DL (ref 12–16)
INR PPP: 1.2 (ref 0.9–1.1)
IRON SATN MFR SERPL: 34 % (ref 20–50)
IRON SERPL-MCNC: 74 UG/DL (ref 42–175)
LDLC SERPL CALC-MCNC: 99 MG/DL
LYMPHOCYTES # BLD AUTO: 1.1 THOU/UL (ref 0.8–4.4)
LYMPHOCYTES NFR BLD AUTO: 31 % (ref 20–40)
MCH RBC QN AUTO: 31.8 PG (ref 27–34)
MCHC RBC AUTO-ENTMCNC: 33.1 G/DL (ref 32–36)
MCV RBC AUTO: 96 FL (ref 80–100)
MONOCYTES # BLD AUTO: 0.3 THOU/UL (ref 0–0.9)
MONOCYTES NFR BLD AUTO: 10 % (ref 2–10)
NEUTROPHILS # BLD AUTO: 2.1 THOU/UL (ref 2–7.7)
NEUTROPHILS NFR BLD AUTO: 58 % (ref 50–70)
PLATELET # BLD AUTO: 144 THOU/UL (ref 140–440)
PMV BLD AUTO: 7.8 FL (ref 7–10)
POTASSIUM BLD-SCNC: 4 MMOL/L (ref 3.5–5)
PROT SERPL-MCNC: 6.3 G/DL (ref 6–8)
RBC # BLD AUTO: 3.57 MILL/UL (ref 3.8–5.4)
SODIUM SERPL-SCNC: 135 MMOL/L (ref 136–145)
T4 FREE SERPL-MCNC: 1.2 NG/DL (ref 0.7–1.8)
TIBC SERPL-MCNC: 217 UG/DL (ref 313–563)
TRANSFERRIN SERPL-MCNC: 174 MG/DL (ref 212–360)
TRIGL SERPL-MCNC: 76 MG/DL
TSH SERPL DL<=0.005 MIU/L-ACNC: 0.44 UIU/ML (ref 0.3–5)
WBC: 3.6 THOU/UL (ref 4–11)

## 2019-06-11 ENCOUNTER — COMMUNICATION - HEALTHEAST (OUTPATIENT)
Dept: FAMILY MEDICINE | Facility: CLINIC | Age: 79
End: 2019-06-11

## 2019-06-11 ENCOUNTER — OFFICE VISIT - HEALTHEAST (OUTPATIENT)
Dept: PHYSICAL MEDICINE AND REHAB | Facility: REHABILITATION | Age: 79
End: 2019-06-11

## 2019-06-11 DIAGNOSIS — M99.05 SOMATIC DYSFUNCTION OF PELVIS REGION: ICD-10-CM

## 2019-06-11 DIAGNOSIS — M99.02 SOMATIC DYSFUNCTION OF THORACIC REGION: ICD-10-CM

## 2019-06-11 DIAGNOSIS — M99.03 SOMATIC DYSFUNCTION OF LUMBAR REGION: ICD-10-CM

## 2019-06-11 DIAGNOSIS — M99.01 SOMATIC DYSFUNCTION OF CERVICAL REGION: ICD-10-CM

## 2019-06-11 DIAGNOSIS — M99.00 SOMATIC DYSFUNCTION OF HEAD REGION: ICD-10-CM

## 2019-06-11 DIAGNOSIS — M99.08 SOMATIC DYSFUNCTION OF RIB REGION: ICD-10-CM

## 2019-06-11 DIAGNOSIS — G89.4 CHRONIC PAIN SYNDROME: ICD-10-CM

## 2019-06-11 DIAGNOSIS — M99.07 SOMATIC DYSFUNCTION OF UPPER EXTREMITY: ICD-10-CM

## 2019-06-11 DIAGNOSIS — M99.06 SOMATIC DYSFUNCTION OF LOWER EXTREMITY: ICD-10-CM

## 2019-06-11 DIAGNOSIS — M99.04 SOMATIC DYSFUNCTION OF SACROILIAC JOINT: ICD-10-CM

## 2019-06-12 ENCOUNTER — RECORDS - HEALTHEAST (OUTPATIENT)
Dept: ADMINISTRATIVE | Facility: OTHER | Age: 79
End: 2019-06-12

## 2019-06-12 ENCOUNTER — OFFICE VISIT - HEALTHEAST (OUTPATIENT)
Dept: PHYSICAL THERAPY | Facility: REHABILITATION | Age: 79
End: 2019-06-12

## 2019-06-12 DIAGNOSIS — M54.50 ACUTE RIGHT-SIDED LOW BACK PAIN WITHOUT SCIATICA: ICD-10-CM

## 2019-06-12 DIAGNOSIS — R53.81 PHYSICAL DECONDITIONING: ICD-10-CM

## 2019-06-12 DIAGNOSIS — M25.562 CHRONIC PAIN OF BOTH KNEES: ICD-10-CM

## 2019-06-12 DIAGNOSIS — G89.4 CHRONIC PAIN SYNDROME: ICD-10-CM

## 2019-06-12 DIAGNOSIS — G89.29 KNEE PAIN, CHRONIC: ICD-10-CM

## 2019-06-12 DIAGNOSIS — R22.40 LOCALIZED SWELLING OF LOWER LEG: ICD-10-CM

## 2019-06-12 DIAGNOSIS — M25.569 KNEE PAIN, CHRONIC: ICD-10-CM

## 2019-06-12 DIAGNOSIS — G89.29 CHRONIC PAIN OF BOTH KNEES: ICD-10-CM

## 2019-06-12 DIAGNOSIS — M25.561 CHRONIC PAIN OF BOTH KNEES: ICD-10-CM

## 2019-06-13 ENCOUNTER — RECORDS - HEALTHEAST (OUTPATIENT)
Dept: ADMINISTRATIVE | Facility: OTHER | Age: 79
End: 2019-06-13

## 2019-06-13 ENCOUNTER — AMBULATORY - HEALTHEAST (OUTPATIENT)
Dept: LAB | Facility: CLINIC | Age: 79
End: 2019-06-13

## 2019-06-13 ENCOUNTER — COMMUNICATION - HEALTHEAST (OUTPATIENT)
Dept: ANTICOAGULATION | Facility: CLINIC | Age: 79
End: 2019-06-13

## 2019-06-13 DIAGNOSIS — I26.99 PULMONARY EMBOLUS, RIGHT (H): ICD-10-CM

## 2019-06-13 DIAGNOSIS — I26.99 PULMONARY EMBOLISM (H): ICD-10-CM

## 2019-06-13 DIAGNOSIS — I27.82 OTHER CHRONIC PULMONARY EMBOLISM WITHOUT ACUTE COR PULMONALE (H): ICD-10-CM

## 2019-06-13 LAB — INR PPP: 1.6 (ref 0.9–1.1)

## 2019-06-17 ENCOUNTER — COMMUNICATION - HEALTHEAST (OUTPATIENT)
Dept: PALLIATIVE MEDICINE | Facility: OTHER | Age: 79
End: 2019-06-17

## 2019-06-17 ENCOUNTER — COMMUNICATION - HEALTHEAST (OUTPATIENT)
Dept: ANTICOAGULATION | Facility: CLINIC | Age: 79
End: 2019-06-17

## 2019-06-17 ENCOUNTER — AMBULATORY - HEALTHEAST (OUTPATIENT)
Dept: LAB | Facility: CLINIC | Age: 79
End: 2019-06-17

## 2019-06-17 ENCOUNTER — OFFICE VISIT - HEALTHEAST (OUTPATIENT)
Dept: PHYSICAL THERAPY | Facility: REHABILITATION | Age: 79
End: 2019-06-17

## 2019-06-17 DIAGNOSIS — G89.29 KNEE PAIN, CHRONIC: ICD-10-CM

## 2019-06-17 DIAGNOSIS — I26.99 PULMONARY EMBOLISM (H): ICD-10-CM

## 2019-06-17 DIAGNOSIS — M25.562 CHRONIC PAIN OF BOTH KNEES: ICD-10-CM

## 2019-06-17 DIAGNOSIS — R22.40 LOCALIZED SWELLING OF LOWER LEG: ICD-10-CM

## 2019-06-17 DIAGNOSIS — M54.50 ACUTE RIGHT-SIDED LOW BACK PAIN WITHOUT SCIATICA: ICD-10-CM

## 2019-06-17 DIAGNOSIS — G89.29 CHRONIC PAIN: ICD-10-CM

## 2019-06-17 DIAGNOSIS — M25.561 CHRONIC PAIN OF BOTH KNEES: ICD-10-CM

## 2019-06-17 DIAGNOSIS — I26.99 PULMONARY EMBOLUS, RIGHT (H): ICD-10-CM

## 2019-06-17 DIAGNOSIS — G89.29 CHRONIC PAIN OF BOTH KNEES: ICD-10-CM

## 2019-06-17 DIAGNOSIS — M25.569 KNEE PAIN, CHRONIC: ICD-10-CM

## 2019-06-17 DIAGNOSIS — I27.82 OTHER CHRONIC PULMONARY EMBOLISM WITHOUT ACUTE COR PULMONALE (H): ICD-10-CM

## 2019-06-17 DIAGNOSIS — G89.4 CHRONIC PAIN SYNDROME: ICD-10-CM

## 2019-06-17 DIAGNOSIS — R53.81 PHYSICAL DECONDITIONING: ICD-10-CM

## 2019-06-17 LAB — INR PPP: 1.5 (ref 0.9–1.1)

## 2019-06-20 ENCOUNTER — COMMUNICATION - HEALTHEAST (OUTPATIENT)
Dept: FAMILY MEDICINE | Facility: CLINIC | Age: 79
End: 2019-06-20

## 2019-06-20 ENCOUNTER — COMMUNICATION - HEALTHEAST (OUTPATIENT)
Dept: ANTICOAGULATION | Facility: CLINIC | Age: 79
End: 2019-06-20

## 2019-06-20 ENCOUNTER — AMBULATORY - HEALTHEAST (OUTPATIENT)
Dept: LAB | Facility: CLINIC | Age: 79
End: 2019-06-20

## 2019-06-20 DIAGNOSIS — I27.82 OTHER CHRONIC PULMONARY EMBOLISM WITHOUT ACUTE COR PULMONALE (H): ICD-10-CM

## 2019-06-20 DIAGNOSIS — I26.99 PULMONARY EMBOLISM (H): ICD-10-CM

## 2019-06-20 LAB — INR PPP: 2 (ref 0.9–1.1)

## 2019-06-25 ENCOUNTER — OFFICE VISIT - HEALTHEAST (OUTPATIENT)
Dept: FAMILY MEDICINE | Facility: CLINIC | Age: 79
End: 2019-06-25

## 2019-06-25 ENCOUNTER — COMMUNICATION - HEALTHEAST (OUTPATIENT)
Dept: ANTICOAGULATION | Facility: CLINIC | Age: 79
End: 2019-06-25

## 2019-06-25 ENCOUNTER — OFFICE VISIT - HEALTHEAST (OUTPATIENT)
Dept: PHYSICAL MEDICINE AND REHAB | Facility: REHABILITATION | Age: 79
End: 2019-06-25

## 2019-06-25 DIAGNOSIS — M99.06 SOMATIC DYSFUNCTION OF LOWER EXTREMITY: ICD-10-CM

## 2019-06-25 DIAGNOSIS — I27.82 OTHER CHRONIC PULMONARY EMBOLISM WITHOUT ACUTE COR PULMONALE (H): ICD-10-CM

## 2019-06-25 DIAGNOSIS — M99.07 SOMATIC DYSFUNCTION OF UPPER EXTREMITY: ICD-10-CM

## 2019-06-25 DIAGNOSIS — I26.99 PULMONARY EMBOLISM (H): ICD-10-CM

## 2019-06-25 DIAGNOSIS — N18.31 CHRONIC KIDNEY DISEASE (CKD) STAGE G3A/A1, MODERATELY DECREASED GLOMERULAR FILTRATION RATE (GFR) BETWEEN 45-59 ML/MIN/1.73 SQUARE METER AND ALBUMINURIA CREATININE RATIO LESS THAN 30 MG/G (H): ICD-10-CM

## 2019-06-25 DIAGNOSIS — M99.05 SOMATIC DYSFUNCTION OF PELVIS REGION: ICD-10-CM

## 2019-06-25 DIAGNOSIS — M99.00 SOMATIC DYSFUNCTION OF HEAD REGION: ICD-10-CM

## 2019-06-25 DIAGNOSIS — M99.03 SOMATIC DYSFUNCTION OF LUMBAR REGION: ICD-10-CM

## 2019-06-25 DIAGNOSIS — M99.01 SOMATIC DYSFUNCTION OF CERVICAL REGION: ICD-10-CM

## 2019-06-25 DIAGNOSIS — M99.08 SOMATIC DYSFUNCTION OF RIB REGION: ICD-10-CM

## 2019-06-25 DIAGNOSIS — M99.02 SOMATIC DYSFUNCTION OF THORACIC REGION: ICD-10-CM

## 2019-06-25 DIAGNOSIS — M99.04 SOMATIC DYSFUNCTION OF SACROILIAC JOINT: ICD-10-CM

## 2019-06-25 DIAGNOSIS — G44.009 CLUSTER HEADACHES: ICD-10-CM

## 2019-06-25 DIAGNOSIS — G89.4 CHRONIC PAIN SYNDROME: ICD-10-CM

## 2019-06-25 LAB — INR PPP: 1.9 (ref 0.9–1.1)

## 2019-06-25 ASSESSMENT — MIFFLIN-ST. JEOR: SCORE: 982.1

## 2019-06-26 LAB
ANION GAP SERPL CALCULATED.3IONS-SCNC: 9 MMOL/L (ref 5–18)
BUN SERPL-MCNC: 22 MG/DL (ref 8–28)
CALCIUM SERPL-MCNC: 8.7 MG/DL (ref 8.5–10.5)
CHLORIDE BLD-SCNC: 107 MMOL/L (ref 98–107)
CO2 SERPL-SCNC: 20 MMOL/L (ref 22–31)
CREAT SERPL-MCNC: 1.15 MG/DL (ref 0.6–1.1)
GFR SERPL CREATININE-BSD FRML MDRD: 46 ML/MIN/1.73M2
GLUCOSE BLD-MCNC: 85 MG/DL (ref 70–125)
POTASSIUM BLD-SCNC: 3.9 MMOL/L (ref 3.5–5)
SODIUM SERPL-SCNC: 136 MMOL/L (ref 136–145)

## 2019-06-28 ENCOUNTER — COMMUNICATION - HEALTHEAST (OUTPATIENT)
Dept: FAMILY MEDICINE | Facility: CLINIC | Age: 79
End: 2019-06-28

## 2019-06-28 ENCOUNTER — OFFICE VISIT - HEALTHEAST (OUTPATIENT)
Dept: PHYSICAL THERAPY | Facility: REHABILITATION | Age: 79
End: 2019-06-28

## 2019-06-28 DIAGNOSIS — R53.81 PHYSICAL DECONDITIONING: ICD-10-CM

## 2019-06-28 DIAGNOSIS — G89.29 KNEE PAIN, CHRONIC: ICD-10-CM

## 2019-06-28 DIAGNOSIS — M25.569 KNEE PAIN, CHRONIC: ICD-10-CM

## 2019-06-28 DIAGNOSIS — G89.4 CHRONIC PAIN SYNDROME: ICD-10-CM

## 2019-06-28 DIAGNOSIS — M25.561 CHRONIC PAIN OF BOTH KNEES: ICD-10-CM

## 2019-06-28 DIAGNOSIS — R22.40 LOCALIZED SWELLING OF LOWER LEG: ICD-10-CM

## 2019-06-28 DIAGNOSIS — M54.50 ACUTE RIGHT-SIDED LOW BACK PAIN WITHOUT SCIATICA: ICD-10-CM

## 2019-06-28 DIAGNOSIS — M25.562 CHRONIC PAIN OF BOTH KNEES: ICD-10-CM

## 2019-06-28 DIAGNOSIS — G89.29 CHRONIC PAIN OF BOTH KNEES: ICD-10-CM

## 2019-07-01 ENCOUNTER — AMBULATORY - HEALTHEAST (OUTPATIENT)
Dept: LAB | Facility: CLINIC | Age: 79
End: 2019-07-01

## 2019-07-01 ENCOUNTER — COMMUNICATION - HEALTHEAST (OUTPATIENT)
Dept: ANTICOAGULATION | Facility: CLINIC | Age: 79
End: 2019-07-01

## 2019-07-01 DIAGNOSIS — I26.99 PULMONARY EMBOLISM (H): ICD-10-CM

## 2019-07-01 DIAGNOSIS — I27.82 OTHER CHRONIC PULMONARY EMBOLISM WITHOUT ACUTE COR PULMONALE (H): ICD-10-CM

## 2019-07-01 LAB — INR PPP: 2.9 (ref 0.9–1.1)

## 2019-07-03 ENCOUNTER — COMMUNICATION - HEALTHEAST (OUTPATIENT)
Dept: PALLIATIVE MEDICINE | Facility: OTHER | Age: 79
End: 2019-07-03

## 2019-07-03 ENCOUNTER — OFFICE VISIT - HEALTHEAST (OUTPATIENT)
Dept: PHYSICAL THERAPY | Facility: REHABILITATION | Age: 79
End: 2019-07-03

## 2019-07-03 DIAGNOSIS — G90.523 COMPLEX REGIONAL PAIN SYNDROME TYPE 1 OF BOTH LOWER EXTREMITIES: ICD-10-CM

## 2019-07-03 DIAGNOSIS — G90.511 COMPLEX REGIONAL PAIN SYNDROME TYPE 1 OF RIGHT UPPER EXTREMITY: ICD-10-CM

## 2019-07-03 DIAGNOSIS — R60.0 LOCALIZED EDEMA: ICD-10-CM

## 2019-07-03 DIAGNOSIS — M54.2 CERVICALGIA: ICD-10-CM

## 2019-07-03 DIAGNOSIS — M25.561 ARTHRALGIA OF RIGHT LOWER LEG: ICD-10-CM

## 2019-07-03 DIAGNOSIS — R10.84 ABDOMINAL PAIN, GENERALIZED: ICD-10-CM

## 2019-07-03 DIAGNOSIS — G89.4 CHRONIC PAIN DISORDER: ICD-10-CM

## 2019-07-03 DIAGNOSIS — M54.50 ACUTE RIGHT-SIDED LOW BACK PAIN WITHOUT SCIATICA: ICD-10-CM

## 2019-07-03 DIAGNOSIS — M79.18 DIFFUSE MYOFASCIAL PAIN SYNDROME: ICD-10-CM

## 2019-07-05 ENCOUNTER — RECORDS - HEALTHEAST (OUTPATIENT)
Dept: ADMINISTRATIVE | Facility: OTHER | Age: 79
End: 2019-07-05

## 2019-07-09 ENCOUNTER — COMMUNICATION - HEALTHEAST (OUTPATIENT)
Dept: ANTICOAGULATION | Facility: CLINIC | Age: 79
End: 2019-07-09

## 2019-07-09 ENCOUNTER — COMMUNICATION - HEALTHEAST (OUTPATIENT)
Dept: FAMILY MEDICINE | Facility: CLINIC | Age: 79
End: 2019-07-09

## 2019-07-09 ENCOUNTER — AMBULATORY - HEALTHEAST (OUTPATIENT)
Dept: LAB | Facility: CLINIC | Age: 79
End: 2019-07-09

## 2019-07-09 DIAGNOSIS — I27.82 OTHER CHRONIC PULMONARY EMBOLISM WITHOUT ACUTE COR PULMONALE (H): ICD-10-CM

## 2019-07-09 DIAGNOSIS — E78.5 HYPERLIPIDEMIA: ICD-10-CM

## 2019-07-09 DIAGNOSIS — I26.99 PULMONARY EMBOLISM (H): ICD-10-CM

## 2019-07-09 LAB — INR PPP: 2.9 (ref 0.9–1.1)

## 2019-07-10 ENCOUNTER — OFFICE VISIT - HEALTHEAST (OUTPATIENT)
Dept: PHYSICAL THERAPY | Facility: REHABILITATION | Age: 79
End: 2019-07-10

## 2019-07-10 DIAGNOSIS — G90.523 COMPLEX REGIONAL PAIN SYNDROME TYPE 1 OF BOTH LOWER EXTREMITIES: ICD-10-CM

## 2019-07-10 DIAGNOSIS — M54.50 ACUTE RIGHT-SIDED LOW BACK PAIN WITHOUT SCIATICA: ICD-10-CM

## 2019-07-10 DIAGNOSIS — R10.84 ABDOMINAL PAIN, GENERALIZED: ICD-10-CM

## 2019-07-10 DIAGNOSIS — G89.29 CHRONIC PAIN OF RIGHT KNEE: ICD-10-CM

## 2019-07-10 DIAGNOSIS — G89.4 CHRONIC PAIN SYNDROME: ICD-10-CM

## 2019-07-10 DIAGNOSIS — M25.561 CHRONIC PAIN OF RIGHT KNEE: ICD-10-CM

## 2019-07-10 DIAGNOSIS — R60.0 LOCALIZED EDEMA: ICD-10-CM

## 2019-07-17 ENCOUNTER — OFFICE VISIT - HEALTHEAST (OUTPATIENT)
Dept: PHYSICAL THERAPY | Facility: REHABILITATION | Age: 79
End: 2019-07-17

## 2019-07-17 DIAGNOSIS — M25.561 CHRONIC PAIN OF RIGHT KNEE: ICD-10-CM

## 2019-07-17 DIAGNOSIS — R10.84 ABDOMINAL PAIN, GENERALIZED: ICD-10-CM

## 2019-07-17 DIAGNOSIS — G89.4 CHRONIC PAIN SYNDROME: ICD-10-CM

## 2019-07-17 DIAGNOSIS — M48.07 STENOSIS OF LATERAL RECESS OF LUMBOSACRAL SPINE: ICD-10-CM

## 2019-07-17 DIAGNOSIS — M54.50 ACUTE RIGHT-SIDED LOW BACK PAIN WITHOUT SCIATICA: ICD-10-CM

## 2019-07-17 DIAGNOSIS — G89.29 CHRONIC PAIN OF RIGHT KNEE: ICD-10-CM

## 2019-07-17 DIAGNOSIS — G90.523 COMPLEX REGIONAL PAIN SYNDROME TYPE 1 OF BOTH LOWER EXTREMITIES: ICD-10-CM

## 2019-07-17 DIAGNOSIS — R60.0 LOCALIZED EDEMA: ICD-10-CM

## 2019-07-19 ENCOUNTER — HOSPITAL ENCOUNTER (OUTPATIENT)
Dept: CT IMAGING | Facility: CLINIC | Age: 79
Discharge: HOME | End: 2019-07-19
Attending: FAMILY MEDICINE

## 2019-07-19 ENCOUNTER — OFFICE VISIT - HEALTHEAST (OUTPATIENT)
Dept: FAMILY MEDICINE | Facility: CLINIC | Age: 79
End: 2019-07-19

## 2019-07-19 ENCOUNTER — COMMUNICATION - HEALTHEAST (OUTPATIENT)
Dept: ANTICOAGULATION | Facility: CLINIC | Age: 79
End: 2019-07-19

## 2019-07-19 DIAGNOSIS — D64.9 ANEMIA, UNSPECIFIED TYPE: ICD-10-CM

## 2019-07-19 DIAGNOSIS — J34.89 SINUS PAIN: ICD-10-CM

## 2019-07-19 DIAGNOSIS — R10.84 ABDOMINAL PAIN, GENERALIZED: ICD-10-CM

## 2019-07-19 DIAGNOSIS — I26.99 PULMONARY EMBOLISM (H): ICD-10-CM

## 2019-07-19 DIAGNOSIS — N18.31 CHRONIC KIDNEY DISEASE (CKD) STAGE G3A/A1, MODERATELY DECREASED GLOMERULAR FILTRATION RATE (GFR) BETWEEN 45-59 ML/MIN/1.73 SQUARE METER AND ALBUMINURIA CREATININE RATIO LESS THAN 30 MG/G (H): ICD-10-CM

## 2019-07-19 DIAGNOSIS — I27.82 OTHER CHRONIC PULMONARY EMBOLISM WITHOUT ACUTE COR PULMONALE (H): ICD-10-CM

## 2019-07-19 DIAGNOSIS — R19.7 DIARRHEA, UNSPECIFIED TYPE: ICD-10-CM

## 2019-07-19 LAB
ALBUMIN SERPL-MCNC: 3.7 G/DL (ref 3.5–5)
ALP SERPL-CCNC: 41 U/L (ref 45–120)
ALT SERPL W P-5'-P-CCNC: 22 U/L (ref 0–45)
ANION GAP SERPL CALCULATED.3IONS-SCNC: 8 MMOL/L (ref 5–18)
AST SERPL W P-5'-P-CCNC: 24 U/L (ref 0–40)
BASOPHILS # BLD AUTO: 0 THOU/UL (ref 0–0.2)
BASOPHILS NFR BLD AUTO: 0 % (ref 0–2)
BILIRUB SERPL-MCNC: 0.4 MG/DL (ref 0–1)
BUN SERPL-MCNC: 21 MG/DL (ref 8–28)
CALCIUM SERPL-MCNC: 8.6 MG/DL (ref 8.5–10.5)
CHLORIDE BLD-SCNC: 107 MMOL/L (ref 98–107)
CO2 SERPL-SCNC: 21 MMOL/L (ref 22–31)
CREAT SERPL-MCNC: 1.24 MG/DL (ref 0.6–1.1)
EOSINOPHIL # BLD AUTO: 0.1 THOU/UL (ref 0–0.4)
EOSINOPHIL NFR BLD AUTO: 2 % (ref 0–6)
ERYTHROCYTE [DISTWIDTH] IN BLOOD BY AUTOMATED COUNT: 13.2 % (ref 11–14.5)
FERRITIN SERPL-MCNC: 166 NG/ML (ref 10–130)
GFR SERPL CREATININE-BSD FRML MDRD: 42 ML/MIN/1.73M2
GLUCOSE BLD-MCNC: 124 MG/DL (ref 70–125)
HCT VFR BLD AUTO: 33.7 % (ref 35–47)
HGB BLD-MCNC: 11.5 G/DL (ref 12–16)
INR PPP: 4.8 (ref 0.9–1.1)
IRON SATN MFR SERPL: 21 % (ref 20–50)
IRON SERPL-MCNC: 48 UG/DL (ref 42–175)
LYMPHOCYTES # BLD AUTO: 1.4 THOU/UL (ref 0.8–4.4)
LYMPHOCYTES NFR BLD AUTO: 35 % (ref 20–40)
MCH RBC QN AUTO: 31.5 PG (ref 27–34)
MCHC RBC AUTO-ENTMCNC: 34 G/DL (ref 32–36)
MCV RBC AUTO: 93 FL (ref 80–100)
MONOCYTES # BLD AUTO: 0.4 THOU/UL (ref 0–0.9)
MONOCYTES NFR BLD AUTO: 9 % (ref 2–10)
NEUTROPHILS # BLD AUTO: 2.2 THOU/UL (ref 2–7.7)
NEUTROPHILS NFR BLD AUTO: 54 % (ref 50–70)
PLATELET # BLD AUTO: 165 THOU/UL (ref 140–440)
PMV BLD AUTO: 7.3 FL (ref 7–10)
POTASSIUM BLD-SCNC: 4 MMOL/L (ref 3.5–5)
PROT SERPL-MCNC: 6.1 G/DL (ref 6–8)
RBC # BLD AUTO: 3.64 MILL/UL (ref 3.8–5.4)
SODIUM SERPL-SCNC: 136 MMOL/L (ref 136–145)
TIBC SERPL-MCNC: 231 UG/DL (ref 313–563)
TRANSFERRIN SERPL-MCNC: 185 MG/DL (ref 212–360)
WBC: 4.1 THOU/UL (ref 4–11)

## 2019-07-22 ENCOUNTER — COMMUNICATION - HEALTHEAST (OUTPATIENT)
Dept: FAMILY MEDICINE | Facility: CLINIC | Age: 79
End: 2019-07-22

## 2019-07-23 ENCOUNTER — COMMUNICATION - HEALTHEAST (OUTPATIENT)
Dept: ADMINISTRATIVE | Facility: CLINIC | Age: 79
End: 2019-07-23

## 2019-07-25 ENCOUNTER — OFFICE VISIT - HEALTHEAST (OUTPATIENT)
Dept: PHYSICAL THERAPY | Facility: REHABILITATION | Age: 79
End: 2019-07-25

## 2019-07-25 ENCOUNTER — HOSPITAL ENCOUNTER (OUTPATIENT)
Dept: PALLIATIVE MEDICINE | Facility: OTHER | Age: 79
Discharge: HOME | End: 2019-07-25
Attending: ANESTHESIOLOGY

## 2019-07-25 DIAGNOSIS — M54.16 LUMBAR RADICULOPATHY: ICD-10-CM

## 2019-07-25 DIAGNOSIS — M54.50 ACUTE RIGHT-SIDED LOW BACK PAIN WITHOUT SCIATICA: ICD-10-CM

## 2019-07-25 DIAGNOSIS — N32.89 BLADDER SPASMS: ICD-10-CM

## 2019-07-25 DIAGNOSIS — M48.07 STENOSIS OF LATERAL RECESS OF LUMBOSACRAL SPINE: ICD-10-CM

## 2019-07-25 DIAGNOSIS — G90.523 COMPLEX REGIONAL PAIN SYNDROME TYPE 1 OF BOTH LOWER EXTREMITIES: ICD-10-CM

## 2019-07-25 DIAGNOSIS — G89.29 CHRONIC PAIN OF RIGHT KNEE: ICD-10-CM

## 2019-07-25 DIAGNOSIS — G90.511 COMPLEX REGIONAL PAIN SYNDROME TYPE 1 OF RIGHT UPPER EXTREMITY: ICD-10-CM

## 2019-07-25 DIAGNOSIS — G90.50 REFLEX SYMPATHETIC DYSTROPHY: ICD-10-CM

## 2019-07-25 DIAGNOSIS — R22.40 LOCALIZED SWELLING OF LOWER LEG: ICD-10-CM

## 2019-07-25 DIAGNOSIS — G89.4 CHRONIC PAIN SYNDROME: ICD-10-CM

## 2019-07-25 DIAGNOSIS — M25.561 CHRONIC PAIN OF RIGHT KNEE: ICD-10-CM

## 2019-07-25 ASSESSMENT — MIFFLIN-ST. JEOR: SCORE: 991.17

## 2019-07-26 ENCOUNTER — COMMUNICATION - HEALTHEAST (OUTPATIENT)
Dept: ADMINISTRATIVE | Facility: CLINIC | Age: 79
End: 2019-07-26

## 2019-07-29 ENCOUNTER — COMMUNICATION - HEALTHEAST (OUTPATIENT)
Dept: ANTICOAGULATION | Facility: CLINIC | Age: 79
End: 2019-07-29

## 2019-07-29 ENCOUNTER — AMBULATORY - HEALTHEAST (OUTPATIENT)
Dept: LAB | Facility: CLINIC | Age: 79
End: 2019-07-29

## 2019-07-29 DIAGNOSIS — I27.82 OTHER CHRONIC PULMONARY EMBOLISM WITHOUT ACUTE COR PULMONALE (H): ICD-10-CM

## 2019-07-29 DIAGNOSIS — I26.99 PULMONARY EMBOLISM (H): ICD-10-CM

## 2019-07-29 LAB — INR PPP: 2.1 (ref 0.9–1.1)

## 2019-07-31 ENCOUNTER — OFFICE VISIT - HEALTHEAST (OUTPATIENT)
Dept: PHYSICAL THERAPY | Facility: REHABILITATION | Age: 79
End: 2019-07-31

## 2019-07-31 DIAGNOSIS — G89.29 CHRONIC PAIN OF RIGHT KNEE: ICD-10-CM

## 2019-07-31 DIAGNOSIS — M54.50 ACUTE RIGHT-SIDED LOW BACK PAIN WITHOUT SCIATICA: ICD-10-CM

## 2019-07-31 DIAGNOSIS — M54.16 LUMBAR RADICULOPATHY: ICD-10-CM

## 2019-07-31 DIAGNOSIS — G90.523 COMPLEX REGIONAL PAIN SYNDROME TYPE 1 OF BOTH LOWER EXTREMITIES: ICD-10-CM

## 2019-07-31 DIAGNOSIS — G90.50 REFLEX SYMPATHETIC DYSTROPHY: ICD-10-CM

## 2019-07-31 DIAGNOSIS — G89.4 CHRONIC PAIN SYNDROME: ICD-10-CM

## 2019-07-31 DIAGNOSIS — M25.561 CHRONIC PAIN OF RIGHT KNEE: ICD-10-CM

## 2019-07-31 DIAGNOSIS — M62.81 GENERALIZED MUSCLE WEAKNESS: ICD-10-CM

## 2019-07-31 DIAGNOSIS — R22.40 LOCALIZED SWELLING OF LOWER LEG: ICD-10-CM

## 2019-07-31 DIAGNOSIS — M48.07 STENOSIS OF LATERAL RECESS OF LUMBOSACRAL SPINE: ICD-10-CM

## 2019-08-05 ENCOUNTER — COMMUNICATION - HEALTHEAST (OUTPATIENT)
Dept: ANTICOAGULATION | Facility: CLINIC | Age: 79
End: 2019-08-05

## 2019-08-05 ENCOUNTER — OFFICE VISIT - HEALTHEAST (OUTPATIENT)
Dept: FAMILY MEDICINE | Facility: CLINIC | Age: 79
End: 2019-08-05

## 2019-08-05 ENCOUNTER — COMMUNICATION - HEALTHEAST (OUTPATIENT)
Dept: PALLIATIVE MEDICINE | Facility: OTHER | Age: 79
End: 2019-08-05

## 2019-08-05 DIAGNOSIS — I26.99 PULMONARY EMBOLISM (H): ICD-10-CM

## 2019-08-05 DIAGNOSIS — G44.009 CLUSTER HEADACHE, NOT INTRACTABLE, UNSPECIFIED CHRONICITY PATTERN: ICD-10-CM

## 2019-08-05 DIAGNOSIS — J34.89 SINUS PAIN: ICD-10-CM

## 2019-08-05 DIAGNOSIS — I27.82 OTHER CHRONIC PULMONARY EMBOLISM WITHOUT ACUTE COR PULMONALE (H): ICD-10-CM

## 2019-08-05 DIAGNOSIS — G89.29 CHRONIC LEFT-SIDED THORACIC BACK PAIN: ICD-10-CM

## 2019-08-05 DIAGNOSIS — M54.6 CHRONIC LEFT-SIDED THORACIC BACK PAIN: ICD-10-CM

## 2019-08-05 DIAGNOSIS — F33.1 MODERATE EPISODE OF RECURRENT MAJOR DEPRESSIVE DISORDER (H): ICD-10-CM

## 2019-08-05 LAB — INR PPP: 2.5 (ref 0.9–1.1)

## 2019-08-06 ENCOUNTER — AMBULATORY - HEALTHEAST (OUTPATIENT)
Dept: PALLIATIVE MEDICINE | Facility: OTHER | Age: 79
End: 2019-08-06

## 2019-08-06 ENCOUNTER — HOSPITAL ENCOUNTER (OUTPATIENT)
Dept: PALLIATIVE MEDICINE | Facility: OTHER | Age: 79
Discharge: HOME | End: 2019-08-06
Attending: PAIN MEDICINE | Admitting: PAIN MEDICINE

## 2019-08-06 DIAGNOSIS — M17.0 OSTEOARTHRITIS OF BOTH KNEES, UNSPECIFIED OSTEOARTHRITIS TYPE: ICD-10-CM

## 2019-08-06 ASSESSMENT — MIFFLIN-ST. JEOR: SCORE: 986.64

## 2019-08-07 ENCOUNTER — COMMUNICATION - HEALTHEAST (OUTPATIENT)
Dept: PALLIATIVE MEDICINE | Facility: OTHER | Age: 79
End: 2019-08-07

## 2019-08-08 ENCOUNTER — OFFICE VISIT - HEALTHEAST (OUTPATIENT)
Dept: PHYSICAL THERAPY | Facility: REHABILITATION | Age: 79
End: 2019-08-08

## 2019-08-08 DIAGNOSIS — M48.07 STENOSIS OF LATERAL RECESS OF LUMBOSACRAL SPINE: ICD-10-CM

## 2019-08-08 DIAGNOSIS — G44.029 CHRONIC CLUSTER HEADACHE, NOT INTRACTABLE: ICD-10-CM

## 2019-08-08 DIAGNOSIS — M54.50 ACUTE RIGHT-SIDED LOW BACK PAIN WITHOUT SCIATICA: ICD-10-CM

## 2019-08-08 DIAGNOSIS — M25.561 CHRONIC PAIN OF RIGHT KNEE: ICD-10-CM

## 2019-08-08 DIAGNOSIS — G89.4 CHRONIC PAIN SYNDROME: ICD-10-CM

## 2019-08-08 DIAGNOSIS — R22.40 LOCALIZED SWELLING OF LOWER LEG: ICD-10-CM

## 2019-08-08 DIAGNOSIS — G89.29 CHRONIC PAIN OF RIGHT KNEE: ICD-10-CM

## 2019-08-08 DIAGNOSIS — M54.16 LUMBAR RADICULOPATHY: ICD-10-CM

## 2019-08-08 DIAGNOSIS — G90.523 COMPLEX REGIONAL PAIN SYNDROME TYPE 1 OF BOTH LOWER EXTREMITIES: ICD-10-CM

## 2019-08-12 ENCOUNTER — COMMUNICATION - HEALTHEAST (OUTPATIENT)
Dept: PALLIATIVE MEDICINE | Facility: OTHER | Age: 79
End: 2019-08-12

## 2019-08-13 ENCOUNTER — COMMUNICATION - HEALTHEAST (OUTPATIENT)
Dept: PALLIATIVE MEDICINE | Facility: OTHER | Age: 79
End: 2019-08-13

## 2019-08-13 ENCOUNTER — OFFICE VISIT - HEALTHEAST (OUTPATIENT)
Dept: PHYSICAL MEDICINE AND REHAB | Facility: REHABILITATION | Age: 79
End: 2019-08-13

## 2019-08-13 ENCOUNTER — AMBULATORY - HEALTHEAST (OUTPATIENT)
Dept: PALLIATIVE MEDICINE | Facility: OTHER | Age: 79
End: 2019-08-13

## 2019-08-13 ENCOUNTER — HOSPITAL ENCOUNTER (OUTPATIENT)
Dept: PALLIATIVE MEDICINE | Facility: OTHER | Age: 79
Discharge: HOME | End: 2019-08-13
Attending: PAIN MEDICINE | Admitting: PAIN MEDICINE

## 2019-08-13 DIAGNOSIS — M99.04 SOMATIC DYSFUNCTION OF SACROILIAC JOINT: ICD-10-CM

## 2019-08-13 DIAGNOSIS — G89.4 CHRONIC PAIN SYNDROME: ICD-10-CM

## 2019-08-13 DIAGNOSIS — M12.88 OTHER SPECIFIC ARTHROPATHIES, NOT ELSEWHERE CLASSIFIED, OTHER SPECIFIED SITE: ICD-10-CM

## 2019-08-13 DIAGNOSIS — M99.06 SOMATIC DYSFUNCTION OF LOWER EXTREMITY: ICD-10-CM

## 2019-08-13 DIAGNOSIS — M79.18 MYOFASCIAL PAIN: ICD-10-CM

## 2019-08-13 DIAGNOSIS — M99.01 SOMATIC DYSFUNCTION OF CERVICAL REGION: ICD-10-CM

## 2019-08-13 DIAGNOSIS — M54.50 LUMBAR SPINE PAIN: ICD-10-CM

## 2019-08-13 DIAGNOSIS — M99.00 SOMATIC DYSFUNCTION OF HEAD REGION: ICD-10-CM

## 2019-08-13 DIAGNOSIS — M99.02 SOMATIC DYSFUNCTION OF THORACIC REGION: ICD-10-CM

## 2019-08-13 DIAGNOSIS — M99.07 SOMATIC DYSFUNCTION OF UPPER EXTREMITY: ICD-10-CM

## 2019-08-13 DIAGNOSIS — M99.08 SOMATIC DYSFUNCTION OF RIB REGION: ICD-10-CM

## 2019-08-13 DIAGNOSIS — M99.03 SOMATIC DYSFUNCTION OF LUMBAR REGION: ICD-10-CM

## 2019-08-13 DIAGNOSIS — M17.0 OSTEOARTHRITIS OF BOTH KNEES, UNSPECIFIED OSTEOARTHRITIS TYPE: ICD-10-CM

## 2019-08-13 DIAGNOSIS — M99.05 SOMATIC DYSFUNCTION OF PELVIS REGION: ICD-10-CM

## 2019-08-13 DIAGNOSIS — M25.521 RIGHT ELBOW PAIN: ICD-10-CM

## 2019-08-13 ASSESSMENT — MIFFLIN-ST. JEOR: SCORE: 986.64

## 2019-08-14 ENCOUNTER — COMMUNICATION - HEALTHEAST (OUTPATIENT)
Dept: PALLIATIVE MEDICINE | Facility: OTHER | Age: 79
End: 2019-08-14

## 2019-08-15 ENCOUNTER — OFFICE VISIT - HEALTHEAST (OUTPATIENT)
Dept: OTOLARYNGOLOGY | Facility: CLINIC | Age: 79
End: 2019-08-15

## 2019-08-15 ENCOUNTER — OFFICE VISIT - HEALTHEAST (OUTPATIENT)
Dept: PHYSICAL THERAPY | Facility: REHABILITATION | Age: 79
End: 2019-08-15

## 2019-08-15 DIAGNOSIS — M54.50 ACUTE RIGHT-SIDED LOW BACK PAIN WITHOUT SCIATICA: ICD-10-CM

## 2019-08-15 DIAGNOSIS — G44.029 CHRONIC CLUSTER HEADACHE, NOT INTRACTABLE: ICD-10-CM

## 2019-08-15 DIAGNOSIS — G50.1 ATYPICAL FACIAL PAIN: ICD-10-CM

## 2019-08-15 DIAGNOSIS — G90.523 COMPLEX REGIONAL PAIN SYNDROME TYPE 1 OF BOTH LOWER EXTREMITIES: ICD-10-CM

## 2019-08-15 DIAGNOSIS — M25.561 CHRONIC PAIN OF RIGHT KNEE: ICD-10-CM

## 2019-08-15 DIAGNOSIS — M26.629 TEMPOROMANDIBULAR JOINT-PAIN-DYSFUNCTION SYNDROME (TMJ): ICD-10-CM

## 2019-08-15 DIAGNOSIS — G89.4 CHRONIC PAIN SYNDROME: ICD-10-CM

## 2019-08-15 DIAGNOSIS — J30.0 VMR (VASOMOTOR RHINITIS): ICD-10-CM

## 2019-08-15 DIAGNOSIS — M48.07 STENOSIS OF LATERAL RECESS OF LUMBOSACRAL SPINE: ICD-10-CM

## 2019-08-15 DIAGNOSIS — G89.29 CHRONIC PAIN OF RIGHT KNEE: ICD-10-CM

## 2019-08-15 DIAGNOSIS — G90.511 COMPLEX REGIONAL PAIN SYNDROME TYPE 1 OF RIGHT UPPER EXTREMITY: ICD-10-CM

## 2019-08-15 DIAGNOSIS — M54.16 LUMBAR RADICULOPATHY: ICD-10-CM

## 2019-08-15 DIAGNOSIS — G90.50 REFLEX SYMPATHETIC DYSTROPHY: ICD-10-CM

## 2019-08-16 ENCOUNTER — COMMUNICATION - HEALTHEAST (OUTPATIENT)
Dept: FAMILY MEDICINE | Facility: CLINIC | Age: 79
End: 2019-08-16

## 2019-08-16 DIAGNOSIS — I27.82 OTHER CHRONIC PULMONARY EMBOLISM WITHOUT ACUTE COR PULMONALE (H): ICD-10-CM

## 2019-08-19 ENCOUNTER — AMBULATORY - HEALTHEAST (OUTPATIENT)
Dept: LAB | Facility: CLINIC | Age: 79
End: 2019-08-19

## 2019-08-19 ENCOUNTER — COMMUNICATION - HEALTHEAST (OUTPATIENT)
Dept: ANTICOAGULATION | Facility: CLINIC | Age: 79
End: 2019-08-19

## 2019-08-19 ENCOUNTER — OFFICE VISIT - HEALTHEAST (OUTPATIENT)
Dept: PHYSICAL THERAPY | Facility: REHABILITATION | Age: 79
End: 2019-08-19

## 2019-08-19 DIAGNOSIS — R22.40 LOCALIZED SWELLING OF LOWER LEG: ICD-10-CM

## 2019-08-19 DIAGNOSIS — I27.82 OTHER CHRONIC PULMONARY EMBOLISM WITHOUT ACUTE COR PULMONALE (H): ICD-10-CM

## 2019-08-19 DIAGNOSIS — M48.07 STENOSIS OF LATERAL RECESS OF LUMBOSACRAL SPINE: ICD-10-CM

## 2019-08-19 DIAGNOSIS — G44.029 CHRONIC CLUSTER HEADACHE, NOT INTRACTABLE: ICD-10-CM

## 2019-08-19 DIAGNOSIS — M26.629 TEMPOROMANDIBULAR JOINT-PAIN-DYSFUNCTION SYNDROME (TMJ): ICD-10-CM

## 2019-08-19 DIAGNOSIS — G89.4 CHRONIC PAIN SYNDROME: ICD-10-CM

## 2019-08-19 DIAGNOSIS — G90.523 COMPLEX REGIONAL PAIN SYNDROME TYPE 1 OF BOTH LOWER EXTREMITIES: ICD-10-CM

## 2019-08-19 DIAGNOSIS — M54.50 ACUTE RIGHT-SIDED LOW BACK PAIN WITHOUT SCIATICA: ICD-10-CM

## 2019-08-19 DIAGNOSIS — I26.99 PULMONARY EMBOLISM (H): ICD-10-CM

## 2019-08-19 DIAGNOSIS — M25.561 CHRONIC PAIN OF RIGHT KNEE: ICD-10-CM

## 2019-08-19 DIAGNOSIS — G89.29 CHRONIC PAIN OF RIGHT KNEE: ICD-10-CM

## 2019-08-19 DIAGNOSIS — M54.16 LUMBAR RADICULOPATHY: ICD-10-CM

## 2019-08-19 LAB — INR PPP: 1.8 (ref 0.9–1.1)

## 2019-08-24 ENCOUNTER — COMMUNICATION - HEALTHEAST (OUTPATIENT)
Dept: SCHEDULING | Facility: CLINIC | Age: 79
End: 2019-08-24

## 2019-08-26 ENCOUNTER — COMMUNICATION - HEALTHEAST (OUTPATIENT)
Dept: PALLIATIVE MEDICINE | Facility: OTHER | Age: 79
End: 2019-08-26

## 2019-08-26 ENCOUNTER — AMBULATORY - HEALTHEAST (OUTPATIENT)
Dept: FAMILY MEDICINE | Facility: CLINIC | Age: 79
End: 2019-08-26

## 2019-08-26 DIAGNOSIS — B02.9 HERPES ZOSTER WITHOUT COMPLICATION: ICD-10-CM

## 2019-08-26 DIAGNOSIS — G90.511 COMPLEX REGIONAL PAIN SYNDROME TYPE 1 OF RIGHT UPPER EXTREMITY: ICD-10-CM

## 2019-08-27 ENCOUNTER — HOSPITAL ENCOUNTER (OUTPATIENT)
Dept: PALLIATIVE MEDICINE | Facility: OTHER | Age: 79
Discharge: HOME | End: 2019-08-27
Attending: PAIN MEDICINE | Admitting: PAIN MEDICINE

## 2019-08-27 ENCOUNTER — OFFICE VISIT - HEALTHEAST (OUTPATIENT)
Dept: PHYSICAL MEDICINE AND REHAB | Facility: REHABILITATION | Age: 79
End: 2019-08-27

## 2019-08-27 DIAGNOSIS — M99.06 SOMATIC DYSFUNCTION OF LOWER EXTREMITY: ICD-10-CM

## 2019-08-27 DIAGNOSIS — M54.50 LUMBAR SPINE PAIN: ICD-10-CM

## 2019-08-27 DIAGNOSIS — M99.08 SOMATIC DYSFUNCTION OF RIB REGION: ICD-10-CM

## 2019-08-27 DIAGNOSIS — M99.03 SOMATIC DYSFUNCTION OF LUMBAR REGION: ICD-10-CM

## 2019-08-27 DIAGNOSIS — M99.00 SOMATIC DYSFUNCTION OF HEAD REGION: ICD-10-CM

## 2019-08-27 DIAGNOSIS — M79.18 MYOFASCIAL PAIN: ICD-10-CM

## 2019-08-27 DIAGNOSIS — M17.0 OSTEOARTHRITIS OF BOTH KNEES, UNSPECIFIED OSTEOARTHRITIS TYPE: ICD-10-CM

## 2019-08-27 DIAGNOSIS — M99.07 SOMATIC DYSFUNCTION OF UPPER EXTREMITY: ICD-10-CM

## 2019-08-27 DIAGNOSIS — M99.05 SOMATIC DYSFUNCTION OF PELVIS REGION: ICD-10-CM

## 2019-08-27 DIAGNOSIS — M99.02 SOMATIC DYSFUNCTION OF THORACIC REGION: ICD-10-CM

## 2019-08-27 DIAGNOSIS — M99.04 SOMATIC DYSFUNCTION OF SACROILIAC JOINT: ICD-10-CM

## 2019-08-27 DIAGNOSIS — M99.01 SOMATIC DYSFUNCTION OF CERVICAL REGION: ICD-10-CM

## 2019-08-27 DIAGNOSIS — G89.4 CHRONIC PAIN SYNDROME: ICD-10-CM

## 2019-08-27 ASSESSMENT — MIFFLIN-ST. JEOR
SCORE: 1000.25
SCORE: 1000.25

## 2019-08-28 ENCOUNTER — COMMUNICATION - HEALTHEAST (OUTPATIENT)
Dept: PALLIATIVE MEDICINE | Facility: OTHER | Age: 79
End: 2019-08-28

## 2019-09-03 ENCOUNTER — COMMUNICATION - HEALTHEAST (OUTPATIENT)
Dept: ANTICOAGULATION | Facility: CLINIC | Age: 79
End: 2019-09-03

## 2019-09-03 ENCOUNTER — OFFICE VISIT - HEALTHEAST (OUTPATIENT)
Dept: FAMILY MEDICINE | Facility: CLINIC | Age: 79
End: 2019-09-03

## 2019-09-03 DIAGNOSIS — N18.31 CHRONIC KIDNEY DISEASE (CKD) STAGE G3A/A1, MODERATELY DECREASED GLOMERULAR FILTRATION RATE (GFR) BETWEEN 45-59 ML/MIN/1.73 SQUARE METER AND ALBUMINURIA CREATININE RATIO LESS THAN 30 MG/G (H): ICD-10-CM

## 2019-09-03 DIAGNOSIS — F32.1 MODERATE MAJOR DEPRESSION (H): ICD-10-CM

## 2019-09-03 DIAGNOSIS — M17.10 ARTHRITIS OF KNEE: ICD-10-CM

## 2019-09-03 DIAGNOSIS — I27.82 OTHER CHRONIC PULMONARY EMBOLISM WITHOUT ACUTE COR PULMONALE (H): ICD-10-CM

## 2019-09-03 DIAGNOSIS — I26.99 PULMONARY EMBOLISM (H): ICD-10-CM

## 2019-09-03 DIAGNOSIS — D64.9 ANEMIA, UNSPECIFIED TYPE: ICD-10-CM

## 2019-09-03 DIAGNOSIS — G44.009 CLUSTER HEADACHES: ICD-10-CM

## 2019-09-03 DIAGNOSIS — R53.83 FATIGUE, UNSPECIFIED TYPE: ICD-10-CM

## 2019-09-03 DIAGNOSIS — M54.16 LUMBAR RADICULOPATHY: ICD-10-CM

## 2019-09-03 LAB
ERYTHROCYTE [DISTWIDTH] IN BLOOD BY AUTOMATED COUNT: 12.9 % (ref 11–14.5)
ERYTHROCYTE [SEDIMENTATION RATE] IN BLOOD BY WESTERGREN METHOD: 10 MM/HR (ref 0–20)
HCT VFR BLD AUTO: 35.7 % (ref 35–47)
HGB BLD-MCNC: 12.5 G/DL (ref 12–16)
INR PPP: 2.8 (ref 0.9–1.1)
MCH RBC QN AUTO: 33.9 PG (ref 27–34)
MCHC RBC AUTO-ENTMCNC: 34.9 G/DL (ref 32–36)
MCV RBC AUTO: 97 FL (ref 80–100)
PLATELET # BLD AUTO: 188 THOU/UL (ref 140–440)
PMV BLD AUTO: 7.3 FL (ref 7–10)
RBC # BLD AUTO: 3.68 MILL/UL (ref 3.8–5.4)
WBC: 4.6 THOU/UL (ref 4–11)

## 2019-09-03 ASSESSMENT — PATIENT HEALTH QUESTIONNAIRE - PHQ9: SUM OF ALL RESPONSES TO PHQ QUESTIONS 1-9: 6

## 2019-09-03 ASSESSMENT — ANXIETY QUESTIONNAIRES
1. FEELING NERVOUS, ANXIOUS, OR ON EDGE: NOT AT ALL
7. FEELING AFRAID AS IF SOMETHING AWFUL MIGHT HAPPEN: NOT AT ALL
5. BEING SO RESTLESS THAT IT IS HARD TO SIT STILL: NOT AT ALL
GAD7 TOTAL SCORE: 1
3. WORRYING TOO MUCH ABOUT DIFFERENT THINGS: NOT AT ALL
6. BECOMING EASILY ANNOYED OR IRRITABLE: SEVERAL DAYS
IF YOU CHECKED OFF ANY PROBLEMS ON THIS QUESTIONNAIRE, HOW DIFFICULT HAVE THESE PROBLEMS MADE IT FOR YOU TO DO YOUR WORK, TAKE CARE OF THINGS AT HOME, OR GET ALONG WITH OTHER PEOPLE: SOMEWHAT DIFFICULT
2. NOT BEING ABLE TO STOP OR CONTROL WORRYING: NOT AT ALL
4. TROUBLE RELAXING: NOT AT ALL

## 2019-09-04 ENCOUNTER — OFFICE VISIT - HEALTHEAST (OUTPATIENT)
Dept: PHYSICAL THERAPY | Facility: REHABILITATION | Age: 79
End: 2019-09-04

## 2019-09-04 DIAGNOSIS — G44.029 CHRONIC CLUSTER HEADACHE, NOT INTRACTABLE: ICD-10-CM

## 2019-09-04 DIAGNOSIS — M54.50 ACUTE RIGHT-SIDED LOW BACK PAIN WITHOUT SCIATICA: ICD-10-CM

## 2019-09-04 DIAGNOSIS — G89.29 CHRONIC PAIN OF RIGHT KNEE: ICD-10-CM

## 2019-09-04 DIAGNOSIS — G89.4 CHRONIC PAIN SYNDROME: ICD-10-CM

## 2019-09-04 DIAGNOSIS — G90.511 COMPLEX REGIONAL PAIN SYNDROME TYPE 1 OF RIGHT UPPER EXTREMITY: ICD-10-CM

## 2019-09-04 DIAGNOSIS — M25.561 CHRONIC PAIN OF RIGHT KNEE: ICD-10-CM

## 2019-09-04 DIAGNOSIS — M48.07 STENOSIS OF LATERAL RECESS OF LUMBOSACRAL SPINE: ICD-10-CM

## 2019-09-04 DIAGNOSIS — M54.16 LUMBAR RADICULOPATHY: ICD-10-CM

## 2019-09-04 DIAGNOSIS — G90.523 COMPLEX REGIONAL PAIN SYNDROME TYPE 1 OF BOTH LOWER EXTREMITIES: ICD-10-CM

## 2019-09-04 DIAGNOSIS — R22.40 LOCALIZED SWELLING OF LOWER LEG: ICD-10-CM

## 2019-09-04 LAB
25(OH)D3 SERPL-MCNC: 77.5 NG/ML (ref 30–80)
ANION GAP SERPL CALCULATED.3IONS-SCNC: 11 MMOL/L (ref 5–18)
BUN SERPL-MCNC: 18 MG/DL (ref 8–28)
C REACTIVE PROTEIN LHE: <0.1 MG/DL (ref 0–0.8)
CALCIUM SERPL-MCNC: 9 MG/DL (ref 8.5–10.5)
CHLORIDE BLD-SCNC: 107 MMOL/L (ref 98–107)
CO2 SERPL-SCNC: 20 MMOL/L (ref 22–31)
CREAT SERPL-MCNC: 1.26 MG/DL (ref 0.6–1.1)
GFR SERPL CREATININE-BSD FRML MDRD: 41 ML/MIN/1.73M2
GLUCOSE BLD-MCNC: 83 MG/DL (ref 70–125)
POTASSIUM BLD-SCNC: 4 MMOL/L (ref 3.5–5)
SODIUM SERPL-SCNC: 138 MMOL/L (ref 136–145)

## 2019-09-05 ENCOUNTER — COMMUNICATION - HEALTHEAST (OUTPATIENT)
Dept: FAMILY MEDICINE | Facility: CLINIC | Age: 79
End: 2019-09-05

## 2019-09-09 ENCOUNTER — COMMUNICATION - HEALTHEAST (OUTPATIENT)
Dept: PALLIATIVE MEDICINE | Facility: OTHER | Age: 79
End: 2019-09-09

## 2019-09-10 ENCOUNTER — COMMUNICATION - HEALTHEAST (OUTPATIENT)
Dept: ANTICOAGULATION | Facility: CLINIC | Age: 79
End: 2019-09-10

## 2019-09-10 ENCOUNTER — OFFICE VISIT - HEALTHEAST (OUTPATIENT)
Dept: PHYSICAL MEDICINE AND REHAB | Facility: REHABILITATION | Age: 79
End: 2019-09-10

## 2019-09-10 ENCOUNTER — OFFICE VISIT - HEALTHEAST (OUTPATIENT)
Dept: ONCOLOGY | Facility: CLINIC | Age: 79
End: 2019-09-10

## 2019-09-10 DIAGNOSIS — M79.18 MYOFASCIAL PAIN: ICD-10-CM

## 2019-09-10 DIAGNOSIS — M99.05 SOMATIC DYSFUNCTION OF PELVIS REGION: ICD-10-CM

## 2019-09-10 DIAGNOSIS — M99.01 SOMATIC DYSFUNCTION OF CERVICAL REGION: ICD-10-CM

## 2019-09-10 DIAGNOSIS — M99.08 SOMATIC DYSFUNCTION OF RIB REGION: ICD-10-CM

## 2019-09-10 DIAGNOSIS — I27.82 OTHER CHRONIC PULMONARY EMBOLISM WITHOUT ACUTE COR PULMONALE (H): ICD-10-CM

## 2019-09-10 DIAGNOSIS — I26.99 PULMONARY EMBOLUS, RIGHT (H): ICD-10-CM

## 2019-09-10 DIAGNOSIS — M99.00 SOMATIC DYSFUNCTION OF HEAD REGION: ICD-10-CM

## 2019-09-10 DIAGNOSIS — M99.03 SOMATIC DYSFUNCTION OF LUMBAR REGION: ICD-10-CM

## 2019-09-10 DIAGNOSIS — M99.04 SOMATIC DYSFUNCTION OF SACROILIAC JOINT: ICD-10-CM

## 2019-09-10 DIAGNOSIS — M99.02 SOMATIC DYSFUNCTION OF THORACIC REGION: ICD-10-CM

## 2019-09-10 DIAGNOSIS — M25.521 PAIN OF BOTH ELBOWS: ICD-10-CM

## 2019-09-10 DIAGNOSIS — G89.4 CHRONIC PAIN SYNDROME: ICD-10-CM

## 2019-09-10 DIAGNOSIS — M99.07 SOMATIC DYSFUNCTION OF UPPER EXTREMITY: ICD-10-CM

## 2019-09-10 DIAGNOSIS — M25.522 PAIN OF BOTH ELBOWS: ICD-10-CM

## 2019-09-10 LAB — INR PPP: 2.83 (ref 0.9–1.1)

## 2019-09-10 ASSESSMENT — MIFFLIN-ST. JEOR: SCORE: 982.1

## 2019-09-11 ENCOUNTER — HOSPITAL ENCOUNTER (OUTPATIENT)
Dept: PALLIATIVE MEDICINE | Facility: OTHER | Age: 79
Discharge: HOME | End: 2019-09-11
Attending: PAIN MEDICINE | Admitting: PAIN MEDICINE

## 2019-09-11 ENCOUNTER — OFFICE VISIT - HEALTHEAST (OUTPATIENT)
Dept: PHYSICAL THERAPY | Facility: REHABILITATION | Age: 79
End: 2019-09-11

## 2019-09-11 ENCOUNTER — COMMUNICATION - HEALTHEAST (OUTPATIENT)
Dept: ADMINISTRATIVE | Facility: HOSPITAL | Age: 79
End: 2019-09-11

## 2019-09-11 DIAGNOSIS — M79.18 MYOFASCIAL PAIN: ICD-10-CM

## 2019-09-11 DIAGNOSIS — M25.561 CHRONIC PAIN OF RIGHT KNEE: ICD-10-CM

## 2019-09-11 DIAGNOSIS — G89.29 CHRONIC PAIN OF BOTH KNEES: ICD-10-CM

## 2019-09-11 DIAGNOSIS — R60.0 LOCALIZED EDEMA: ICD-10-CM

## 2019-09-11 DIAGNOSIS — M26.629 TEMPOROMANDIBULAR JOINT-PAIN-DYSFUNCTION SYNDROME (TMJ): ICD-10-CM

## 2019-09-11 DIAGNOSIS — M25.561 CHRONIC PAIN OF BOTH KNEES: ICD-10-CM

## 2019-09-11 DIAGNOSIS — G89.4 CHRONIC PAIN SYNDROME: ICD-10-CM

## 2019-09-11 DIAGNOSIS — M25.562 CHRONIC PAIN OF BOTH KNEES: ICD-10-CM

## 2019-09-11 DIAGNOSIS — G89.29 CHRONIC PAIN OF RIGHT KNEE: ICD-10-CM

## 2019-09-11 DIAGNOSIS — G90.523 COMPLEX REGIONAL PAIN SYNDROME TYPE 1 OF BOTH LOWER EXTREMITIES: ICD-10-CM

## 2019-09-11 DIAGNOSIS — R22.40 LOCALIZED SWELLING OF LOWER LEG: ICD-10-CM

## 2019-09-11 ASSESSMENT — MIFFLIN-ST. JEOR: SCORE: 973.03

## 2019-09-12 ENCOUNTER — COMMUNICATION - HEALTHEAST (OUTPATIENT)
Dept: PALLIATIVE MEDICINE | Facility: OTHER | Age: 79
End: 2019-09-12

## 2019-09-13 ENCOUNTER — RECORDS - HEALTHEAST (OUTPATIENT)
Dept: ADMINISTRATIVE | Facility: OTHER | Age: 79
End: 2019-09-13

## 2019-09-13 ENCOUNTER — AMBULATORY - HEALTHEAST (OUTPATIENT)
Dept: ONCOLOGY | Facility: HOSPITAL | Age: 79
End: 2019-09-13

## 2019-09-13 DIAGNOSIS — I26.99 PULMONARY EMBOLUS, RIGHT (H): ICD-10-CM

## 2019-09-13 DIAGNOSIS — D64.9 ANEMIA, UNSPECIFIED TYPE: ICD-10-CM

## 2019-09-13 DIAGNOSIS — I26.99 OTHER ACUTE PULMONARY EMBOLISM WITHOUT ACUTE COR PULMONALE (H): ICD-10-CM

## 2019-09-19 ENCOUNTER — HOSPITAL ENCOUNTER (OUTPATIENT)
Dept: PALLIATIVE MEDICINE | Facility: OTHER | Age: 79
Discharge: HOME | End: 2019-09-19
Attending: ANESTHESIOLOGY

## 2019-09-19 DIAGNOSIS — M54.16 LUMBAR RADICULOPATHY: ICD-10-CM

## 2019-09-19 DIAGNOSIS — M79.18 MYOFASCIAL PAIN: ICD-10-CM

## 2019-09-19 DIAGNOSIS — G90.511 COMPLEX REGIONAL PAIN SYNDROME TYPE 1 OF RIGHT UPPER EXTREMITY: ICD-10-CM

## 2019-09-19 ASSESSMENT — MIFFLIN-ST. JEOR: SCORE: 973.03

## 2019-09-24 ENCOUNTER — RECORDS - HEALTHEAST (OUTPATIENT)
Dept: ADMINISTRATIVE | Facility: OTHER | Age: 79
End: 2019-09-24

## 2019-09-25 ENCOUNTER — OFFICE VISIT - HEALTHEAST (OUTPATIENT)
Dept: PHYSICAL THERAPY | Facility: REHABILITATION | Age: 79
End: 2019-09-25

## 2019-09-25 ENCOUNTER — AMBULATORY - HEALTHEAST (OUTPATIENT)
Dept: LAB | Facility: CLINIC | Age: 79
End: 2019-09-25

## 2019-09-25 ENCOUNTER — COMMUNICATION - HEALTHEAST (OUTPATIENT)
Dept: ANTICOAGULATION | Facility: CLINIC | Age: 79
End: 2019-09-25

## 2019-09-25 DIAGNOSIS — G90.523 COMPLEX REGIONAL PAIN SYNDROME TYPE 1 OF BOTH LOWER EXTREMITIES: ICD-10-CM

## 2019-09-25 DIAGNOSIS — I26.99 PULMONARY EMBOLISM (H): ICD-10-CM

## 2019-09-25 DIAGNOSIS — R22.40 LOCALIZED SWELLING OF LOWER LEG: ICD-10-CM

## 2019-09-25 DIAGNOSIS — M26.629 TEMPOROMANDIBULAR JOINT-PAIN-DYSFUNCTION SYNDROME (TMJ): ICD-10-CM

## 2019-09-25 DIAGNOSIS — M48.07 STENOSIS OF LATERAL RECESS OF LUMBOSACRAL SPINE: ICD-10-CM

## 2019-09-25 DIAGNOSIS — M54.50 ACUTE RIGHT-SIDED LOW BACK PAIN WITHOUT SCIATICA: ICD-10-CM

## 2019-09-25 DIAGNOSIS — M25.561 CHRONIC PAIN OF RIGHT KNEE: ICD-10-CM

## 2019-09-25 DIAGNOSIS — G90.511 COMPLEX REGIONAL PAIN SYNDROME TYPE 1 OF RIGHT UPPER EXTREMITY: ICD-10-CM

## 2019-09-25 DIAGNOSIS — G89.4 CHRONIC PAIN SYNDROME: ICD-10-CM

## 2019-09-25 DIAGNOSIS — G89.29 CHRONIC PAIN OF RIGHT KNEE: ICD-10-CM

## 2019-09-25 DIAGNOSIS — I27.82 OTHER CHRONIC PULMONARY EMBOLISM WITHOUT ACUTE COR PULMONALE (H): ICD-10-CM

## 2019-09-25 DIAGNOSIS — M79.18 MYOFASCIAL PAIN: ICD-10-CM

## 2019-09-25 LAB — INR PPP: 1.6 (ref 0.9–1.1)

## 2019-09-26 ENCOUNTER — AMBULATORY - HEALTHEAST (OUTPATIENT)
Dept: PALLIATIVE MEDICINE | Facility: OTHER | Age: 79
End: 2019-09-26

## 2019-10-01 ENCOUNTER — HOSPITAL ENCOUNTER (OUTPATIENT)
Dept: ULTRASOUND IMAGING | Facility: CLINIC | Age: 79
Discharge: HOME | End: 2019-10-01
Attending: UROLOGY

## 2019-10-01 DIAGNOSIS — R31.0 HEMATURIA, GROSS: ICD-10-CM

## 2019-10-02 ENCOUNTER — OFFICE VISIT - HEALTHEAST (OUTPATIENT)
Dept: FAMILY MEDICINE | Facility: CLINIC | Age: 79
End: 2019-10-02

## 2019-10-02 ENCOUNTER — COMMUNICATION - HEALTHEAST (OUTPATIENT)
Dept: ANTICOAGULATION | Facility: CLINIC | Age: 79
End: 2019-10-02

## 2019-10-02 DIAGNOSIS — Z86.718 HISTORY OF BLOOD CLOTS: ICD-10-CM

## 2019-10-02 DIAGNOSIS — Z01.818 PRE-OPERATIVE EXAMINATION: ICD-10-CM

## 2019-10-02 DIAGNOSIS — R53.83 FATIGUE, UNSPECIFIED TYPE: ICD-10-CM

## 2019-10-02 DIAGNOSIS — I10 ESSENTIAL HYPERTENSION: ICD-10-CM

## 2019-10-02 DIAGNOSIS — E03.9 ACQUIRED HYPOTHYROIDISM: ICD-10-CM

## 2019-10-02 DIAGNOSIS — I26.99 PULMONARY EMBOLISM (H): ICD-10-CM

## 2019-10-02 DIAGNOSIS — E78.2 MIXED HYPERLIPIDEMIA: ICD-10-CM

## 2019-10-02 DIAGNOSIS — I27.82 OTHER CHRONIC PULMONARY EMBOLISM WITHOUT ACUTE COR PULMONALE (H): ICD-10-CM

## 2019-10-02 DIAGNOSIS — Z23 IMMUNIZATION DUE: ICD-10-CM

## 2019-10-02 DIAGNOSIS — R10.9 FLANK PAIN: ICD-10-CM

## 2019-10-02 LAB
ALBUMIN SERPL-MCNC: 4 G/DL (ref 3.5–5)
ALBUMIN UR-MCNC: ABNORMAL MG/DL
ALP SERPL-CCNC: 41 U/L (ref 45–120)
ALT SERPL W P-5'-P-CCNC: 15 U/L (ref 0–45)
ANION GAP SERPL CALCULATED.3IONS-SCNC: 9 MMOL/L (ref 5–18)
APPEARANCE UR: ABNORMAL
AST SERPL W P-5'-P-CCNC: 19 U/L (ref 0–40)
BILIRUB SERPL-MCNC: 0.4 MG/DL (ref 0–1)
BILIRUB UR QL STRIP: NEGATIVE
BUN SERPL-MCNC: 24 MG/DL (ref 8–28)
C REACTIVE PROTEIN LHE: 0.2 MG/DL (ref 0–0.8)
CALCIUM SERPL-MCNC: 8.7 MG/DL (ref 8.5–10.5)
CHLORIDE BLD-SCNC: 108 MMOL/L (ref 98–107)
CO2 SERPL-SCNC: 21 MMOL/L (ref 22–31)
COLOR UR AUTO: YELLOW
CREAT SERPL-MCNC: 1.41 MG/DL (ref 0.6–1.1)
ERYTHROCYTE [DISTWIDTH] IN BLOOD BY AUTOMATED COUNT: 13.3 % (ref 11–14.5)
GFR SERPL CREATININE-BSD FRML MDRD: 36 ML/MIN/1.73M2
GLUCOSE BLD-MCNC: 88 MG/DL (ref 70–125)
GLUCOSE UR STRIP-MCNC: NEGATIVE MG/DL
HCT VFR BLD AUTO: 33.7 % (ref 35–47)
HGB BLD-MCNC: 11.7 G/DL (ref 12–16)
HGB UR QL STRIP: ABNORMAL
INR PPP: 3 (ref 0.9–1.1)
KETONES UR STRIP-MCNC: NEGATIVE MG/DL
LEUKOCYTE ESTERASE UR QL STRIP: NEGATIVE
MCH RBC QN AUTO: 34 PG (ref 27–34)
MCHC RBC AUTO-ENTMCNC: 34.8 G/DL (ref 32–36)
MCV RBC AUTO: 98 FL (ref 80–100)
NITRATE UR QL: NEGATIVE
PH UR STRIP: 7.5 [PH] (ref 5–8)
PLATELET # BLD AUTO: 168 THOU/UL (ref 140–440)
PMV BLD AUTO: 7.2 FL (ref 7–10)
POTASSIUM BLD-SCNC: 3.7 MMOL/L (ref 3.5–5)
PROT SERPL-MCNC: 6.5 G/DL (ref 6–8)
RBC # BLD AUTO: 3.45 MILL/UL (ref 3.8–5.4)
SODIUM SERPL-SCNC: 138 MMOL/L (ref 136–145)
SP GR UR STRIP: 1.01 (ref 1–1.03)
UROBILINOGEN UR STRIP-ACNC: ABNORMAL
WBC: 3.9 THOU/UL (ref 4–11)

## 2019-10-02 ASSESSMENT — MIFFLIN-ST. JEOR: SCORE: 975.58

## 2019-10-03 ENCOUNTER — RECORDS - HEALTHEAST (OUTPATIENT)
Dept: ADMINISTRATIVE | Facility: OTHER | Age: 79
End: 2019-10-03

## 2019-10-03 ENCOUNTER — COMMUNICATION - HEALTHEAST (OUTPATIENT)
Dept: FAMILY MEDICINE | Facility: CLINIC | Age: 79
End: 2019-10-03

## 2019-10-03 LAB — BACTERIA SPEC CULT: NO GROWTH

## 2019-10-07 ENCOUNTER — COMMUNICATION - HEALTHEAST (OUTPATIENT)
Dept: SCHEDULING | Facility: CLINIC | Age: 79
End: 2019-10-07

## 2019-10-07 ENCOUNTER — COMMUNICATION - HEALTHEAST (OUTPATIENT)
Dept: FAMILY MEDICINE | Facility: CLINIC | Age: 79
End: 2019-10-07

## 2019-10-07 ENCOUNTER — OFFICE VISIT - HEALTHEAST (OUTPATIENT)
Dept: FAMILY MEDICINE | Facility: CLINIC | Age: 79
End: 2019-10-07

## 2019-10-07 DIAGNOSIS — Z78.9 MEDICATION INTOLERANCE: ICD-10-CM

## 2019-10-07 DIAGNOSIS — I95.9 HYPOTENSION, UNSPECIFIED HYPOTENSION TYPE: ICD-10-CM

## 2019-10-07 DIAGNOSIS — R58 ECCHYMOSIS: ICD-10-CM

## 2019-10-07 DIAGNOSIS — I27.82 OTHER CHRONIC PULMONARY EMBOLISM WITHOUT ACUTE COR PULMONALE (H): ICD-10-CM

## 2019-10-07 DIAGNOSIS — N18.31 CHRONIC KIDNEY DISEASE (CKD) STAGE G3A/A1, MODERATELY DECREASED GLOMERULAR FILTRATION RATE (GFR) BETWEEN 45-59 ML/MIN/1.73 SQUARE METER AND ALBUMINURIA CREATININE RATIO LESS THAN 30 MG/G (H): ICD-10-CM

## 2019-10-07 LAB
ERYTHROCYTE [DISTWIDTH] IN BLOOD BY AUTOMATED COUNT: 13.2 % (ref 11–14.5)
HCT VFR BLD AUTO: 33.8 % (ref 35–47)
HGB BLD-MCNC: 11.7 G/DL (ref 12–16)
INR PPP: 1.08 (ref 0.9–1.1)
MCH RBC QN AUTO: 33.8 PG (ref 27–34)
MCHC RBC AUTO-ENTMCNC: 34.6 G/DL (ref 32–36)
MCV RBC AUTO: 98 FL (ref 80–100)
PLATELET # BLD AUTO: 168 THOU/UL (ref 140–440)
PMV BLD AUTO: 7.2 FL (ref 7–10)
RBC # BLD AUTO: 3.46 MILL/UL (ref 3.8–5.4)
WBC: 3.9 THOU/UL (ref 4–11)

## 2019-10-10 ENCOUNTER — OFFICE VISIT - HEALTHEAST (OUTPATIENT)
Dept: PHYSICAL THERAPY | Facility: REHABILITATION | Age: 79
End: 2019-10-10

## 2019-10-10 DIAGNOSIS — G89.29 CHRONIC PAIN OF RIGHT KNEE: ICD-10-CM

## 2019-10-10 DIAGNOSIS — M25.561 CHRONIC PAIN OF RIGHT KNEE: ICD-10-CM

## 2019-10-10 DIAGNOSIS — M25.561 ARTHRALGIA OF BOTH LOWER LEGS: ICD-10-CM

## 2019-10-10 DIAGNOSIS — M25.562 ARTHRALGIA OF BOTH LOWER LEGS: ICD-10-CM

## 2019-10-10 DIAGNOSIS — G89.4 CHRONIC PAIN SYNDROME: ICD-10-CM

## 2019-10-10 DIAGNOSIS — G90.523 COMPLEX REGIONAL PAIN SYNDROME TYPE 1 OF BOTH LOWER EXTREMITIES: ICD-10-CM

## 2019-10-11 ENCOUNTER — OFFICE VISIT - HEALTHEAST (OUTPATIENT)
Dept: FAMILY MEDICINE | Facility: CLINIC | Age: 79
End: 2019-10-11

## 2019-10-11 ENCOUNTER — COMMUNICATION - HEALTHEAST (OUTPATIENT)
Dept: ANTICOAGULATION | Facility: CLINIC | Age: 79
End: 2019-10-11

## 2019-10-11 ENCOUNTER — AMBULATORY - HEALTHEAST (OUTPATIENT)
Dept: ENDOCRINOLOGY | Facility: CLINIC | Age: 79
End: 2019-10-11

## 2019-10-11 ENCOUNTER — COMMUNICATION - HEALTHEAST (OUTPATIENT)
Dept: SCHEDULING | Facility: CLINIC | Age: 79
End: 2019-10-11

## 2019-10-11 DIAGNOSIS — I26.99 PULMONARY EMBOLISM (H): ICD-10-CM

## 2019-10-11 DIAGNOSIS — I27.82 OTHER CHRONIC PULMONARY EMBOLISM WITHOUT ACUTE COR PULMONALE (H): ICD-10-CM

## 2019-10-11 DIAGNOSIS — K52.9 CHRONIC DIARRHEA: ICD-10-CM

## 2019-10-11 LAB
ALBUMIN SERPL-MCNC: 3.9 G/DL (ref 3.5–5)
ALP SERPL-CCNC: 36 U/L (ref 45–120)
ALT SERPL W P-5'-P-CCNC: 12 U/L (ref 0–45)
ANION GAP SERPL CALCULATED.3IONS-SCNC: 11 MMOL/L (ref 5–18)
AST SERPL W P-5'-P-CCNC: 19 U/L (ref 0–40)
BILIRUB SERPL-MCNC: 0.4 MG/DL (ref 0–1)
BUN SERPL-MCNC: 16 MG/DL (ref 8–28)
CALCIUM SERPL-MCNC: 9 MG/DL (ref 8.5–10.5)
CHLORIDE BLD-SCNC: 106 MMOL/L (ref 98–107)
CO2 SERPL-SCNC: 21 MMOL/L (ref 22–31)
CREAT SERPL-MCNC: 1.43 MG/DL (ref 0.6–1.1)
GFR SERPL CREATININE-BSD FRML MDRD: 36 ML/MIN/1.73M2
GLUCOSE BLD-MCNC: 117 MG/DL (ref 70–125)
INR PPP: 1.2 (ref 0.9–1.1)
LIPASE SERPL-CCNC: 290 U/L (ref 0–52)
POTASSIUM BLD-SCNC: 3.8 MMOL/L (ref 3.5–5)
PROT SERPL-MCNC: 6.4 G/DL (ref 6–8)
SODIUM SERPL-SCNC: 138 MMOL/L (ref 136–145)

## 2019-10-11 ASSESSMENT — MIFFLIN-ST. JEOR: SCORE: 982.1

## 2019-10-14 ENCOUNTER — COMMUNICATION - HEALTHEAST (OUTPATIENT)
Dept: ANTICOAGULATION | Facility: CLINIC | Age: 79
End: 2019-10-14

## 2019-10-14 ENCOUNTER — AMBULATORY - HEALTHEAST (OUTPATIENT)
Dept: LAB | Facility: CLINIC | Age: 79
End: 2019-10-14

## 2019-10-14 DIAGNOSIS — I26.99 PULMONARY EMBOLISM (H): ICD-10-CM

## 2019-10-14 DIAGNOSIS — I27.82 OTHER CHRONIC PULMONARY EMBOLISM WITHOUT ACUTE COR PULMONALE (H): ICD-10-CM

## 2019-10-14 LAB — INR PPP: 1.3 (ref 0.9–1.1)

## 2019-10-15 ENCOUNTER — ANESTHESIA - HEALTHEAST (OUTPATIENT)
Dept: INTERVENTIONAL RADIOLOGY/VASCULAR | Facility: HOSPITAL | Age: 79
End: 2019-10-15

## 2019-10-15 ENCOUNTER — AMBULATORY - HEALTHEAST (OUTPATIENT)
Dept: FAMILY MEDICINE | Facility: CLINIC | Age: 79
End: 2019-10-15

## 2019-10-15 ENCOUNTER — COMMUNICATION - HEALTHEAST (OUTPATIENT)
Dept: ONCOLOGY | Facility: CLINIC | Age: 79
End: 2019-10-15

## 2019-10-15 DIAGNOSIS — R74.8 ELEVATED LIPASE: ICD-10-CM

## 2019-10-16 ENCOUNTER — COMMUNICATION - HEALTHEAST (OUTPATIENT)
Dept: ANTICOAGULATION | Facility: CLINIC | Age: 79
End: 2019-10-16

## 2019-10-16 ENCOUNTER — HOSPITAL ENCOUNTER (OUTPATIENT)
Dept: INTERVENTIONAL RADIOLOGY/VASCULAR | Facility: HOSPITAL | Age: 79
Setting detail: RADIATION/ONCOLOGY SERIES
Discharge: STILL ADMITTED OR ADMINISTRATIVE | End: 2019-10-16
Attending: INTERNAL MEDICINE

## 2019-10-16 DIAGNOSIS — I26.99 PULMONARY EMBOLUS, RIGHT (H): ICD-10-CM

## 2019-10-16 DIAGNOSIS — I27.82 OTHER CHRONIC PULMONARY EMBOLISM WITHOUT ACUTE COR PULMONALE (H): ICD-10-CM

## 2019-10-16 DIAGNOSIS — D64.9 ANEMIA, UNSPECIFIED TYPE: ICD-10-CM

## 2019-10-16 DIAGNOSIS — I26.99 OTHER ACUTE PULMONARY EMBOLISM WITHOUT ACUTE COR PULMONALE (H): ICD-10-CM

## 2019-10-16 LAB — INR PPP: 1.87 (ref 0.9–1.1)

## 2019-10-18 ENCOUNTER — COMMUNICATION - HEALTHEAST (OUTPATIENT)
Dept: FAMILY MEDICINE | Facility: CLINIC | Age: 79
End: 2019-10-18

## 2019-10-18 ENCOUNTER — COMMUNICATION - HEALTHEAST (OUTPATIENT)
Dept: PALLIATIVE MEDICINE | Facility: OTHER | Age: 79
End: 2019-10-18

## 2019-10-18 ENCOUNTER — COMMUNICATION - HEALTHEAST (OUTPATIENT)
Dept: ANTICOAGULATION | Facility: CLINIC | Age: 79
End: 2019-10-18

## 2019-10-18 ENCOUNTER — AMBULATORY - HEALTHEAST (OUTPATIENT)
Dept: LAB | Facility: CLINIC | Age: 79
End: 2019-10-18

## 2019-10-18 DIAGNOSIS — I26.99 PULMONARY EMBOLISM (H): ICD-10-CM

## 2019-10-18 DIAGNOSIS — M54.16 LUMBAR RADICULOPATHY: ICD-10-CM

## 2019-10-18 DIAGNOSIS — I27.82 OTHER CHRONIC PULMONARY EMBOLISM WITHOUT ACUTE COR PULMONALE (H): ICD-10-CM

## 2019-10-18 DIAGNOSIS — E78.2 MIXED HYPERLIPIDEMIA: ICD-10-CM

## 2019-10-18 LAB — INR PPP: 3.2 (ref 0.9–1.1)

## 2019-10-21 ENCOUNTER — OFFICE VISIT - HEALTHEAST (OUTPATIENT)
Dept: PHYSICAL THERAPY | Facility: REHABILITATION | Age: 79
End: 2019-10-21

## 2019-10-21 DIAGNOSIS — M25.562 ARTHRALGIA OF BOTH LOWER LEGS: ICD-10-CM

## 2019-10-21 DIAGNOSIS — R22.40 LOCALIZED SWELLING OF LOWER LEG: ICD-10-CM

## 2019-10-21 DIAGNOSIS — M54.50 ACUTE RIGHT-SIDED LOW BACK PAIN WITHOUT SCIATICA: ICD-10-CM

## 2019-10-21 DIAGNOSIS — M25.561 ARTHRALGIA OF BOTH LOWER LEGS: ICD-10-CM

## 2019-10-21 DIAGNOSIS — G90.50 REFLEX SYMPATHETIC DYSTROPHY: ICD-10-CM

## 2019-10-21 DIAGNOSIS — M26.629 TEMPOROMANDIBULAR JOINT-PAIN-DYSFUNCTION SYNDROME (TMJ): ICD-10-CM

## 2019-10-21 DIAGNOSIS — G89.4 CHRONIC PAIN SYNDROME: ICD-10-CM

## 2019-10-21 DIAGNOSIS — G90.511 COMPLEX REGIONAL PAIN SYNDROME TYPE 1 OF RIGHT UPPER EXTREMITY: ICD-10-CM

## 2019-10-21 DIAGNOSIS — R60.0 LOCALIZED EDEMA: ICD-10-CM

## 2019-10-21 DIAGNOSIS — G90.523 COMPLEX REGIONAL PAIN SYNDROME TYPE 1 OF BOTH LOWER EXTREMITIES: ICD-10-CM

## 2019-10-21 DIAGNOSIS — M79.18 MYOFASCIAL PAIN: ICD-10-CM

## 2019-10-21 DIAGNOSIS — M48.07 STENOSIS OF LATERAL RECESS OF LUMBOSACRAL SPINE: ICD-10-CM

## 2019-10-24 ENCOUNTER — AMBULATORY - HEALTHEAST (OUTPATIENT)
Dept: LAB | Facility: CLINIC | Age: 79
End: 2019-10-24

## 2019-10-24 ENCOUNTER — COMMUNICATION - HEALTHEAST (OUTPATIENT)
Dept: ANTICOAGULATION | Facility: CLINIC | Age: 79
End: 2019-10-24

## 2019-10-24 DIAGNOSIS — I26.99 PULMONARY EMBOLISM (H): ICD-10-CM

## 2019-10-24 DIAGNOSIS — I27.82 OTHER CHRONIC PULMONARY EMBOLISM WITHOUT ACUTE COR PULMONALE (H): ICD-10-CM

## 2019-10-24 LAB — INR PPP: 3.3 (ref 0.9–1.1)

## 2019-10-28 ENCOUNTER — OFFICE VISIT - HEALTHEAST (OUTPATIENT)
Dept: PHYSICAL THERAPY | Facility: REHABILITATION | Age: 79
End: 2019-10-28

## 2019-10-28 DIAGNOSIS — G90.511 COMPLEX REGIONAL PAIN SYNDROME TYPE 1 OF RIGHT UPPER EXTREMITY: ICD-10-CM

## 2019-10-28 DIAGNOSIS — M25.561 ARTHRALGIA OF BOTH LOWER LEGS: ICD-10-CM

## 2019-10-28 DIAGNOSIS — M25.562 ARTHRALGIA OF BOTH LOWER LEGS: ICD-10-CM

## 2019-10-28 DIAGNOSIS — R10.84 ABDOMINAL PAIN, GENERALIZED: ICD-10-CM

## 2019-10-28 DIAGNOSIS — M54.2 CERVICALGIA: ICD-10-CM

## 2019-10-28 DIAGNOSIS — G89.4 CHRONIC PAIN SYNDROME: ICD-10-CM

## 2019-10-28 DIAGNOSIS — G90.523 COMPLEX REGIONAL PAIN SYNDROME TYPE 1 OF BOTH LOWER EXTREMITIES: ICD-10-CM

## 2019-10-28 DIAGNOSIS — M79.18 MYOFASCIAL PAIN: ICD-10-CM

## 2019-10-29 ENCOUNTER — COMMUNICATION - HEALTHEAST (OUTPATIENT)
Dept: PALLIATIVE MEDICINE | Facility: CLINIC | Age: 79
End: 2019-10-29

## 2019-10-29 ENCOUNTER — COMMUNICATION - HEALTHEAST (OUTPATIENT)
Dept: PALLIATIVE MEDICINE | Facility: OTHER | Age: 79
End: 2019-10-29

## 2019-10-29 DIAGNOSIS — G90.523 COMPLEX REGIONAL PAIN SYNDROME TYPE 1 OF BOTH LOWER EXTREMITIES: ICD-10-CM

## 2019-10-29 DIAGNOSIS — M54.16 LUMBAR RADICULOPATHY: ICD-10-CM

## 2019-10-30 ENCOUNTER — COMMUNICATION - HEALTHEAST (OUTPATIENT)
Dept: FAMILY MEDICINE | Facility: CLINIC | Age: 79
End: 2019-10-30

## 2019-10-30 ENCOUNTER — COMMUNICATION - HEALTHEAST (OUTPATIENT)
Dept: ANTICOAGULATION | Facility: CLINIC | Age: 79
End: 2019-10-30

## 2019-10-30 ENCOUNTER — OFFICE VISIT - HEALTHEAST (OUTPATIENT)
Dept: FAMILY MEDICINE | Facility: CLINIC | Age: 79
End: 2019-10-30

## 2019-10-30 DIAGNOSIS — E78.2 MIXED HYPERLIPIDEMIA: ICD-10-CM

## 2019-10-30 DIAGNOSIS — K52.9 CHRONIC DIARRHEA: ICD-10-CM

## 2019-10-30 DIAGNOSIS — I27.82 OTHER CHRONIC PULMONARY EMBOLISM WITHOUT ACUTE COR PULMONALE (H): ICD-10-CM

## 2019-10-30 DIAGNOSIS — I26.99 PULMONARY EMBOLISM (H): ICD-10-CM

## 2019-10-30 DIAGNOSIS — E03.9 ACQUIRED HYPOTHYROIDISM: ICD-10-CM

## 2019-10-30 DIAGNOSIS — R74.8 ELEVATED LIPASE: ICD-10-CM

## 2019-10-30 DIAGNOSIS — R68.89 COLD INTOLERANCE: ICD-10-CM

## 2019-10-30 DIAGNOSIS — N18.31 CHRONIC KIDNEY DISEASE (CKD) STAGE G3A/A1, MODERATELY DECREASED GLOMERULAR FILTRATION RATE (GFR) BETWEEN 45-59 ML/MIN/1.73 SQUARE METER AND ALBUMINURIA CREATININE RATIO LESS THAN 30 MG/G (H): ICD-10-CM

## 2019-10-30 DIAGNOSIS — D64.9 ANEMIA, UNSPECIFIED TYPE: ICD-10-CM

## 2019-10-30 LAB — INR PPP: 2.5 (ref 0.9–1.1)

## 2019-10-31 ENCOUNTER — AMBULATORY - HEALTHEAST (OUTPATIENT)
Dept: LAB | Facility: CLINIC | Age: 79
End: 2019-10-31

## 2019-10-31 DIAGNOSIS — R74.8 ELEVATED LIPASE: ICD-10-CM

## 2019-10-31 DIAGNOSIS — E78.2 MIXED HYPERLIPIDEMIA: ICD-10-CM

## 2019-10-31 DIAGNOSIS — E03.9 ACQUIRED HYPOTHYROIDISM: ICD-10-CM

## 2019-10-31 DIAGNOSIS — N18.31 CHRONIC KIDNEY DISEASE (CKD) STAGE G3A/A1, MODERATELY DECREASED GLOMERULAR FILTRATION RATE (GFR) BETWEEN 45-59 ML/MIN/1.73 SQUARE METER AND ALBUMINURIA CREATININE RATIO LESS THAN 30 MG/G (H): ICD-10-CM

## 2019-10-31 DIAGNOSIS — D64.9 ANEMIA, UNSPECIFIED TYPE: ICD-10-CM

## 2019-10-31 DIAGNOSIS — K52.9 CHRONIC DIARRHEA: ICD-10-CM

## 2019-10-31 DIAGNOSIS — R68.89 COLD INTOLERANCE: ICD-10-CM

## 2019-10-31 LAB
ALBUMIN SERPL-MCNC: 3.8 G/DL (ref 3.5–5)
ALP SERPL-CCNC: 40 U/L (ref 45–120)
ALT SERPL W P-5'-P-CCNC: 13 U/L (ref 0–45)
ANION GAP SERPL CALCULATED.3IONS-SCNC: 11 MMOL/L (ref 5–18)
AST SERPL W P-5'-P-CCNC: 23 U/L (ref 0–40)
BASOPHILS # BLD AUTO: 0 THOU/UL (ref 0–0.2)
BASOPHILS NFR BLD AUTO: 1 % (ref 0–2)
BILIRUB SERPL-MCNC: 0.5 MG/DL (ref 0–1)
BUN SERPL-MCNC: 16 MG/DL (ref 8–28)
CALCIUM SERPL-MCNC: 8.2 MG/DL (ref 8.5–10.5)
CHLORIDE BLD-SCNC: 111 MMOL/L (ref 98–107)
CHOLEST SERPL-MCNC: 220 MG/DL
CO2 SERPL-SCNC: 19 MMOL/L (ref 22–31)
CORTIS SERPL-MCNC: 15.4 UG/DL
CREAT SERPL-MCNC: 1.32 MG/DL (ref 0.6–1.1)
EOSINOPHIL # BLD AUTO: 0.1 THOU/UL (ref 0–0.4)
EOSINOPHIL NFR BLD AUTO: 4 % (ref 0–6)
ERYTHROCYTE [DISTWIDTH] IN BLOOD BY AUTOMATED COUNT: 12.3 % (ref 11–14.5)
FASTING STATUS PATIENT QL REPORTED: ABNORMAL
FERRITIN SERPL-MCNC: 174 NG/ML (ref 10–130)
GFR SERPL CREATININE-BSD FRML MDRD: 39 ML/MIN/1.73M2
GLUCOSE BLD-MCNC: 81 MG/DL (ref 70–125)
HCT VFR BLD AUTO: 33.8 % (ref 35–47)
HDLC SERPL-MCNC: 78 MG/DL
HGB BLD-MCNC: 11.5 G/DL (ref 12–16)
IRON SATN MFR SERPL: 25 % (ref 20–50)
IRON SERPL-MCNC: 58 UG/DL (ref 42–175)
LDLC SERPL CALC-MCNC: 122 MG/DL
LIPASE SERPL-CCNC: 37 U/L (ref 0–52)
LYMPHOCYTES # BLD AUTO: 1.2 THOU/UL (ref 0.8–4.4)
LYMPHOCYTES NFR BLD AUTO: 38 % (ref 20–40)
MCH RBC QN AUTO: 33.4 PG (ref 27–34)
MCHC RBC AUTO-ENTMCNC: 34 G/DL (ref 32–36)
MCV RBC AUTO: 99 FL (ref 80–100)
MONOCYTES # BLD AUTO: 0.3 THOU/UL (ref 0–0.9)
MONOCYTES NFR BLD AUTO: 9 % (ref 2–10)
NEUTROPHILS # BLD AUTO: 1.6 THOU/UL (ref 2–7.7)
NEUTROPHILS NFR BLD AUTO: 49 % (ref 50–70)
PLATELET # BLD AUTO: 165 THOU/UL (ref 140–440)
PMV BLD AUTO: 6.9 FL (ref 7–10)
POTASSIUM BLD-SCNC: 3.9 MMOL/L (ref 3.5–5)
PROT SERPL-MCNC: 6.2 G/DL (ref 6–8)
RBC # BLD AUTO: 3.43 MILL/UL (ref 3.8–5.4)
SODIUM SERPL-SCNC: 141 MMOL/L (ref 136–145)
T3FREE SERPL-MCNC: 2.8 PG/ML (ref 1.9–3.9)
T4 FREE SERPL-MCNC: 1.3 NG/DL (ref 0.7–1.8)
TIBC SERPL-MCNC: 228 UG/DL (ref 313–563)
TRANSFERRIN SERPL-MCNC: 182 MG/DL (ref 212–360)
TRIGL SERPL-MCNC: 100 MG/DL
TSH SERPL DL<=0.005 MIU/L-ACNC: 0.62 UIU/ML (ref 0.3–5)
WBC: 3.2 THOU/UL (ref 4–11)

## 2019-11-01 ENCOUNTER — COMMUNICATION - HEALTHEAST (OUTPATIENT)
Dept: FAMILY MEDICINE | Facility: CLINIC | Age: 79
End: 2019-11-01

## 2019-11-01 ENCOUNTER — RECORDS - HEALTHEAST (OUTPATIENT)
Dept: ADMINISTRATIVE | Facility: OTHER | Age: 79
End: 2019-11-01

## 2019-11-04 ENCOUNTER — OFFICE VISIT - HEALTHEAST (OUTPATIENT)
Dept: CARDIOLOGY | Facility: CLINIC | Age: 79
End: 2019-11-04

## 2019-11-04 ENCOUNTER — RECORDS - HEALTHEAST (OUTPATIENT)
Dept: ADMINISTRATIVE | Facility: OTHER | Age: 79
End: 2019-11-04

## 2019-11-04 DIAGNOSIS — I25.10 CORONARY ARTERY DISEASE INVOLVING NATIVE CORONARY ARTERY OF NATIVE HEART WITHOUT ANGINA PECTORIS: ICD-10-CM

## 2019-11-04 DIAGNOSIS — I65.23 BILATERAL CAROTID ARTERY STENOSIS: ICD-10-CM

## 2019-11-04 DIAGNOSIS — E78.2 MIXED HYPERLIPIDEMIA: ICD-10-CM

## 2019-11-04 LAB
GLIADIN IGA SER-ACNC: 0.4 U/ML
GLIADIN IGG SER-ACNC: <0.4 U/ML
IGA SERPL-MCNC: 128 MG/DL (ref 65–400)
TTG IGA SER-ACNC: 0.1 U/ML
TTG IGG SER-ACNC: <0.6 U/ML

## 2019-11-04 ASSESSMENT — MIFFLIN-ST. JEOR: SCORE: 982.1

## 2019-11-05 LAB — IGF-I BLD-MCNC: 76 NG/ML (ref 20–214)

## 2019-11-06 ENCOUNTER — COMMUNICATION - HEALTHEAST (OUTPATIENT)
Dept: FAMILY MEDICINE | Facility: CLINIC | Age: 79
End: 2019-11-06

## 2019-11-06 ENCOUNTER — AMBULATORY - HEALTHEAST (OUTPATIENT)
Dept: LAB | Facility: CLINIC | Age: 79
End: 2019-11-06

## 2019-11-06 ENCOUNTER — COMMUNICATION - HEALTHEAST (OUTPATIENT)
Dept: ANTICOAGULATION | Facility: CLINIC | Age: 79
End: 2019-11-06

## 2019-11-06 ENCOUNTER — HOSPITAL ENCOUNTER (OUTPATIENT)
Dept: ULTRASOUND IMAGING | Facility: CLINIC | Age: 79
Discharge: HOME | End: 2019-11-06
Attending: INTERNAL MEDICINE

## 2019-11-06 DIAGNOSIS — D64.9 ANEMIA, UNSPECIFIED TYPE: ICD-10-CM

## 2019-11-06 DIAGNOSIS — I27.82 OTHER CHRONIC PULMONARY EMBOLISM WITHOUT ACUTE COR PULMONALE (H): ICD-10-CM

## 2019-11-06 DIAGNOSIS — I65.23 BILATERAL CAROTID ARTERY STENOSIS: ICD-10-CM

## 2019-11-06 DIAGNOSIS — I26.99 PULMONARY EMBOLISM (H): ICD-10-CM

## 2019-11-06 LAB — INR PPP: 3.7 (ref 0.9–1.1)

## 2019-11-07 ENCOUNTER — OFFICE VISIT - HEALTHEAST (OUTPATIENT)
Dept: PHYSICAL THERAPY | Facility: REHABILITATION | Age: 79
End: 2019-11-07

## 2019-11-07 DIAGNOSIS — G89.4 CHRONIC PAIN SYNDROME: ICD-10-CM

## 2019-11-07 DIAGNOSIS — R10.84 ABDOMINAL PAIN, GENERALIZED: ICD-10-CM

## 2019-11-07 DIAGNOSIS — M79.18 MYOFASCIAL PAIN: ICD-10-CM

## 2019-11-07 DIAGNOSIS — G90.511 COMPLEX REGIONAL PAIN SYNDROME TYPE 1 OF RIGHT UPPER EXTREMITY: ICD-10-CM

## 2019-11-07 DIAGNOSIS — E03.9 ACQUIRED HYPOTHYROIDISM: ICD-10-CM

## 2019-11-07 DIAGNOSIS — G90.523 COMPLEX REGIONAL PAIN SYNDROME TYPE 1 OF BOTH LOWER EXTREMITIES: ICD-10-CM

## 2019-11-07 DIAGNOSIS — M48.07 STENOSIS OF LATERAL RECESS OF LUMBOSACRAL SPINE: ICD-10-CM

## 2019-11-07 DIAGNOSIS — M54.2 CERVICALGIA: ICD-10-CM

## 2019-11-07 DIAGNOSIS — M54.50 ACUTE RIGHT-SIDED LOW BACK PAIN WITHOUT SCIATICA: ICD-10-CM

## 2019-11-11 ENCOUNTER — HOSPITAL ENCOUNTER (OUTPATIENT)
Dept: PALLIATIVE MEDICINE | Facility: OTHER | Age: 79
Discharge: HOME | End: 2019-11-11
Attending: ANESTHESIOLOGY

## 2019-11-11 ENCOUNTER — COMMUNICATION - HEALTHEAST (OUTPATIENT)
Dept: PALLIATIVE MEDICINE | Facility: OTHER | Age: 79
End: 2019-11-11

## 2019-11-11 DIAGNOSIS — G89.4 CHRONIC PAIN SYNDROME: ICD-10-CM

## 2019-11-11 DIAGNOSIS — M79.18 MYOFASCIAL PAIN: ICD-10-CM

## 2019-11-11 DIAGNOSIS — G90.511 COMPLEX REGIONAL PAIN SYNDROME TYPE 1 OF RIGHT UPPER EXTREMITY: ICD-10-CM

## 2019-11-11 DIAGNOSIS — M54.16 LUMBAR RADICULOPATHY: ICD-10-CM

## 2019-11-11 ASSESSMENT — MIFFLIN-ST. JEOR: SCORE: 950.35

## 2019-11-13 ENCOUNTER — OFFICE VISIT - HEALTHEAST (OUTPATIENT)
Dept: PHYSICAL THERAPY | Facility: REHABILITATION | Age: 79
End: 2019-11-13

## 2019-11-13 DIAGNOSIS — M79.18 MYOFASCIAL PAIN: ICD-10-CM

## 2019-11-13 DIAGNOSIS — M48.07 STENOSIS OF LATERAL RECESS OF LUMBOSACRAL SPINE: ICD-10-CM

## 2019-11-13 DIAGNOSIS — G90.50 REFLEX SYMPATHETIC DYSTROPHY: ICD-10-CM

## 2019-11-13 DIAGNOSIS — G90.523 COMPLEX REGIONAL PAIN SYNDROME TYPE 1 OF BOTH LOWER EXTREMITIES: ICD-10-CM

## 2019-11-13 DIAGNOSIS — G89.4 CHRONIC PAIN SYNDROME: ICD-10-CM

## 2019-11-13 DIAGNOSIS — M54.50 ACUTE RIGHT-SIDED LOW BACK PAIN WITHOUT SCIATICA: ICD-10-CM

## 2019-11-13 DIAGNOSIS — G90.511 COMPLEX REGIONAL PAIN SYNDROME TYPE 1 OF RIGHT UPPER EXTREMITY: ICD-10-CM

## 2019-11-14 ENCOUNTER — COMMUNICATION - HEALTHEAST (OUTPATIENT)
Dept: ANTICOAGULATION | Facility: CLINIC | Age: 79
End: 2019-11-14

## 2019-11-14 ENCOUNTER — AMBULATORY - HEALTHEAST (OUTPATIENT)
Dept: LAB | Facility: CLINIC | Age: 79
End: 2019-11-14

## 2019-11-14 ENCOUNTER — COMMUNICATION - HEALTHEAST (OUTPATIENT)
Dept: FAMILY MEDICINE | Facility: CLINIC | Age: 79
End: 2019-11-14

## 2019-11-14 DIAGNOSIS — I27.82 OTHER CHRONIC PULMONARY EMBOLISM WITHOUT ACUTE COR PULMONALE (H): ICD-10-CM

## 2019-11-14 DIAGNOSIS — I26.99 PULMONARY EMBOLISM (H): ICD-10-CM

## 2019-11-14 DIAGNOSIS — G44.009 CLUSTER HEADACHES: ICD-10-CM

## 2019-11-14 LAB — INR PPP: 5 (ref 0.9–1.1)

## 2019-11-15 ENCOUNTER — RECORDS - HEALTHEAST (OUTPATIENT)
Dept: ADMINISTRATIVE | Facility: OTHER | Age: 79
End: 2019-11-15

## 2019-11-15 ENCOUNTER — COMMUNICATION - HEALTHEAST (OUTPATIENT)
Dept: FAMILY MEDICINE | Facility: CLINIC | Age: 79
End: 2019-11-15

## 2019-11-15 DIAGNOSIS — I27.82 OTHER CHRONIC PULMONARY EMBOLISM WITHOUT ACUTE COR PULMONALE (H): ICD-10-CM

## 2019-11-18 ENCOUNTER — COMMUNICATION - HEALTHEAST (OUTPATIENT)
Dept: FAMILY MEDICINE | Facility: CLINIC | Age: 79
End: 2019-11-18

## 2019-11-19 ENCOUNTER — HOSPITAL ENCOUNTER (OUTPATIENT)
Dept: CT IMAGING | Facility: CLINIC | Age: 79
Discharge: HOME | End: 2019-11-19
Attending: INTERNAL MEDICINE

## 2019-11-19 DIAGNOSIS — R10.9 CHRONIC ABDOMINAL PAIN: ICD-10-CM

## 2019-11-19 DIAGNOSIS — G89.29 CHRONIC ABDOMINAL PAIN: ICD-10-CM

## 2019-11-21 ENCOUNTER — COMMUNICATION - HEALTHEAST (OUTPATIENT)
Dept: FAMILY MEDICINE | Facility: CLINIC | Age: 79
End: 2019-11-21

## 2019-11-21 ENCOUNTER — RECORDS - HEALTHEAST (OUTPATIENT)
Dept: ADMINISTRATIVE | Facility: OTHER | Age: 79
End: 2019-11-21

## 2019-11-21 DIAGNOSIS — I27.82 OTHER CHRONIC PULMONARY EMBOLISM WITHOUT ACUTE COR PULMONALE (H): ICD-10-CM

## 2019-11-22 ENCOUNTER — HOSPITAL ENCOUNTER (OUTPATIENT)
Dept: MAMMOGRAPHY | Facility: CLINIC | Age: 79
Discharge: HOME | End: 2019-11-22
Attending: FAMILY MEDICINE

## 2019-11-22 DIAGNOSIS — Z12.31 VISIT FOR SCREENING MAMMOGRAM: ICD-10-CM

## 2019-11-25 ENCOUNTER — OFFICE VISIT - HEALTHEAST (OUTPATIENT)
Dept: PHYSICAL THERAPY | Facility: REHABILITATION | Age: 79
End: 2019-11-25

## 2019-11-25 DIAGNOSIS — G89.29 CHRONIC PAIN OF RIGHT KNEE: ICD-10-CM

## 2019-11-25 DIAGNOSIS — M25.561 CHRONIC PAIN OF RIGHT KNEE: ICD-10-CM

## 2019-11-25 DIAGNOSIS — M79.18 MYOFASCIAL PAIN: ICD-10-CM

## 2019-11-25 DIAGNOSIS — M54.16 LUMBAR RADICULOPATHY: ICD-10-CM

## 2019-11-25 DIAGNOSIS — M48.07 STENOSIS OF LATERAL RECESS OF LUMBOSACRAL SPINE: ICD-10-CM

## 2019-11-25 DIAGNOSIS — G90.50 REFLEX SYMPATHETIC DYSTROPHY: ICD-10-CM

## 2019-11-25 DIAGNOSIS — G89.4 CHRONIC PAIN SYNDROME: ICD-10-CM

## 2019-11-25 DIAGNOSIS — R10.32 LEFT LOWER QUADRANT ABDOMINAL PAIN: ICD-10-CM

## 2019-11-25 DIAGNOSIS — G90.511 COMPLEX REGIONAL PAIN SYNDROME TYPE 1 OF RIGHT UPPER EXTREMITY: ICD-10-CM

## 2019-11-25 DIAGNOSIS — G90.523 COMPLEX REGIONAL PAIN SYNDROME TYPE 1 OF BOTH LOWER EXTREMITIES: ICD-10-CM

## 2019-11-26 ENCOUNTER — OFFICE VISIT - HEALTHEAST (OUTPATIENT)
Dept: FAMILY MEDICINE | Facility: CLINIC | Age: 79
End: 2019-11-26

## 2019-11-26 ENCOUNTER — COMMUNICATION - HEALTHEAST (OUTPATIENT)
Dept: ANTICOAGULATION | Facility: CLINIC | Age: 79
End: 2019-11-26

## 2019-11-26 DIAGNOSIS — E78.2 MIXED HYPERLIPIDEMIA: ICD-10-CM

## 2019-11-26 DIAGNOSIS — I26.99 PULMONARY EMBOLISM (H): ICD-10-CM

## 2019-11-26 DIAGNOSIS — I27.82 OTHER CHRONIC PULMONARY EMBOLISM WITHOUT ACUTE COR PULMONALE (H): ICD-10-CM

## 2019-11-26 DIAGNOSIS — N18.31 CHRONIC KIDNEY DISEASE (CKD) STAGE G3A/A1, MODERATELY DECREASED GLOMERULAR FILTRATION RATE (GFR) BETWEEN 45-59 ML/MIN/1.73 SQUARE METER AND ALBUMINURIA CREATININE RATIO LESS THAN 30 MG/G (H): ICD-10-CM

## 2019-11-26 DIAGNOSIS — D64.9 ANEMIA, UNSPECIFIED TYPE: ICD-10-CM

## 2019-11-26 DIAGNOSIS — K52.9 CHRONIC DIARRHEA: ICD-10-CM

## 2019-11-26 LAB
ERYTHROCYTE [DISTWIDTH] IN BLOOD BY AUTOMATED COUNT: 11.7 % (ref 11–14.5)
HCT VFR BLD AUTO: 36.2 % (ref 35–47)
HGB BLD-MCNC: 12.6 G/DL (ref 12–16)
INR PPP: 2 (ref 0.9–1.1)
MCH RBC QN AUTO: 33.6 PG (ref 27–34)
MCHC RBC AUTO-ENTMCNC: 34.7 G/DL (ref 32–36)
MCV RBC AUTO: 97 FL (ref 80–100)
PLATELET # BLD AUTO: 156 THOU/UL (ref 140–440)
PMV BLD AUTO: 7.7 FL (ref 7–10)
RBC # BLD AUTO: 3.74 MILL/UL (ref 3.8–5.4)
WBC: 5.6 THOU/UL (ref 4–11)

## 2019-11-26 ASSESSMENT — MIFFLIN-ST. JEOR: SCORE: 950.35

## 2019-11-27 ENCOUNTER — AMBULATORY - HEALTHEAST (OUTPATIENT)
Dept: LAB | Facility: CLINIC | Age: 79
End: 2019-11-27

## 2019-11-27 DIAGNOSIS — D64.9 ANEMIA, UNSPECIFIED TYPE: ICD-10-CM

## 2019-11-27 DIAGNOSIS — N18.31 CHRONIC KIDNEY DISEASE (CKD) STAGE G3A/A1, MODERATELY DECREASED GLOMERULAR FILTRATION RATE (GFR) BETWEEN 45-59 ML/MIN/1.73 SQUARE METER AND ALBUMINURIA CREATININE RATIO LESS THAN 30 MG/G (H): ICD-10-CM

## 2019-11-27 LAB
ALBUMIN SERPL-MCNC: 3.8 G/DL (ref 3.5–5)
ALP SERPL-CCNC: 48 U/L (ref 45–120)
ALT SERPL W P-5'-P-CCNC: 16 U/L (ref 0–45)
ANION GAP SERPL CALCULATED.3IONS-SCNC: 10 MMOL/L (ref 5–18)
AST SERPL W P-5'-P-CCNC: 19 U/L (ref 0–40)
BILIRUB SERPL-MCNC: 0.3 MG/DL (ref 0–1)
BUN SERPL-MCNC: 25 MG/DL (ref 8–28)
CALCIUM SERPL-MCNC: 8.5 MG/DL (ref 8.5–10.5)
CHLORIDE BLD-SCNC: 106 MMOL/L (ref 98–107)
CO2 SERPL-SCNC: 18 MMOL/L (ref 22–31)
CREAT SERPL-MCNC: 1.33 MG/DL (ref 0.6–1.1)
FERRITIN SERPL-MCNC: 179 NG/ML (ref 10–130)
FOLATE SERPL-MCNC: 8.2 NG/ML
GFR SERPL CREATININE-BSD FRML MDRD: 39 ML/MIN/1.73M2
GLUCOSE BLD-MCNC: 99 MG/DL (ref 70–125)
IRON SATN MFR SERPL: 26 % (ref 20–50)
IRON SERPL-MCNC: 62 UG/DL (ref 42–175)
POTASSIUM BLD-SCNC: 3.8 MMOL/L (ref 3.5–5)
PROT SERPL-MCNC: 6.3 G/DL (ref 6–8)
RETICS # AUTO: 0.04 MILL/UL (ref 0.01–0.11)
RETICS/RBC NFR AUTO: 1 % (ref 0.8–2.7)
SODIUM SERPL-SCNC: 134 MMOL/L (ref 136–145)
TIBC SERPL-MCNC: 242 UG/DL (ref 313–563)
TRANSFERRIN SERPL-MCNC: 193 MG/DL (ref 212–360)
VIT B12 SERPL-MCNC: >2000 PG/ML (ref 213–816)

## 2019-11-29 ENCOUNTER — RECORDS - HEALTHEAST (OUTPATIENT)
Dept: ADMINISTRATIVE | Facility: OTHER | Age: 79
End: 2019-11-29

## 2019-12-03 ENCOUNTER — AMBULATORY - HEALTHEAST (OUTPATIENT)
Dept: LAB | Facility: CLINIC | Age: 79
End: 2019-12-03

## 2019-12-03 ENCOUNTER — COMMUNICATION - HEALTHEAST (OUTPATIENT)
Dept: ANTICOAGULATION | Facility: CLINIC | Age: 79
End: 2019-12-03

## 2019-12-03 DIAGNOSIS — I27.82 OTHER CHRONIC PULMONARY EMBOLISM WITHOUT ACUTE COR PULMONALE (H): ICD-10-CM

## 2019-12-03 DIAGNOSIS — I26.99 PULMONARY EMBOLISM (H): ICD-10-CM

## 2019-12-03 LAB — INR PPP: 1.9 (ref 0.9–1.1)

## 2019-12-05 ENCOUNTER — OFFICE VISIT - HEALTHEAST (OUTPATIENT)
Dept: PHYSICAL THERAPY | Facility: REHABILITATION | Age: 79
End: 2019-12-05

## 2019-12-05 DIAGNOSIS — G89.4 CHRONIC PAIN SYNDROME: ICD-10-CM

## 2019-12-05 DIAGNOSIS — M25.561 ARTHRALGIA OF BOTH LOWER LEGS: ICD-10-CM

## 2019-12-05 DIAGNOSIS — M48.07 STENOSIS OF LATERAL RECESS OF LUMBOSACRAL SPINE: ICD-10-CM

## 2019-12-05 DIAGNOSIS — R10.84 ABDOMINAL PAIN, GENERALIZED: ICD-10-CM

## 2019-12-05 DIAGNOSIS — M79.18 MYOFASCIAL PAIN: ICD-10-CM

## 2019-12-05 DIAGNOSIS — G90.523 COMPLEX REGIONAL PAIN SYNDROME TYPE 1 OF BOTH LOWER EXTREMITIES: ICD-10-CM

## 2019-12-05 DIAGNOSIS — M25.562 ARTHRALGIA OF BOTH LOWER LEGS: ICD-10-CM

## 2019-12-05 DIAGNOSIS — G90.511 COMPLEX REGIONAL PAIN SYNDROME TYPE 1 OF RIGHT UPPER EXTREMITY: ICD-10-CM

## 2019-12-12 ENCOUNTER — OFFICE VISIT - HEALTHEAST (OUTPATIENT)
Dept: ENDOCRINOLOGY | Facility: CLINIC | Age: 79
End: 2019-12-12

## 2019-12-12 DIAGNOSIS — M85.89 OSTEOPENIA OF MULTIPLE SITES: ICD-10-CM

## 2019-12-12 ASSESSMENT — MIFFLIN-ST. JEOR: SCORE: 995.71

## 2019-12-13 ENCOUNTER — COMMUNICATION - HEALTHEAST (OUTPATIENT)
Dept: FAMILY MEDICINE | Facility: CLINIC | Age: 79
End: 2019-12-13

## 2019-12-16 ENCOUNTER — COMMUNICATION - HEALTHEAST (OUTPATIENT)
Dept: FAMILY MEDICINE | Facility: CLINIC | Age: 79
End: 2019-12-16

## 2019-12-17 ENCOUNTER — RECORDS - HEALTHEAST (OUTPATIENT)
Dept: ADMINISTRATIVE | Facility: OTHER | Age: 79
End: 2019-12-17

## 2019-12-20 ENCOUNTER — OFFICE VISIT - HEALTHEAST (OUTPATIENT)
Dept: FAMILY MEDICINE | Facility: CLINIC | Age: 79
End: 2019-12-20

## 2019-12-20 ENCOUNTER — RECORDS - HEALTHEAST (OUTPATIENT)
Dept: ADMINISTRATIVE | Facility: OTHER | Age: 79
End: 2019-12-20

## 2019-12-20 ENCOUNTER — COMMUNICATION - HEALTHEAST (OUTPATIENT)
Dept: ANTICOAGULATION | Facility: CLINIC | Age: 79
End: 2019-12-20

## 2019-12-20 DIAGNOSIS — F32.A ANXIETY AND DEPRESSION: ICD-10-CM

## 2019-12-20 DIAGNOSIS — I27.82 OTHER CHRONIC PULMONARY EMBOLISM WITHOUT ACUTE COR PULMONALE (H): ICD-10-CM

## 2019-12-20 DIAGNOSIS — Z86.718 HISTORY OF BLOOD CLOTS: ICD-10-CM

## 2019-12-20 DIAGNOSIS — I26.99 PULMONARY EMBOLISM (H): ICD-10-CM

## 2019-12-20 DIAGNOSIS — D64.9 ANEMIA, UNSPECIFIED TYPE: ICD-10-CM

## 2019-12-20 DIAGNOSIS — F41.9 ANXIETY AND DEPRESSION: ICD-10-CM

## 2019-12-20 DIAGNOSIS — N18.31 CHRONIC KIDNEY DISEASE (CKD) STAGE G3A/A1, MODERATELY DECREASED GLOMERULAR FILTRATION RATE (GFR) BETWEEN 45-59 ML/MIN/1.73 SQUARE METER AND ALBUMINURIA CREATININE RATIO LESS THAN 30 MG/G (H): ICD-10-CM

## 2019-12-20 DIAGNOSIS — E03.9 ACQUIRED HYPOTHYROIDISM: ICD-10-CM

## 2019-12-20 DIAGNOSIS — D73.5 SPLENIC INFARCTION: ICD-10-CM

## 2019-12-20 DIAGNOSIS — G47.00 INSOMNIA, UNSPECIFIED TYPE: ICD-10-CM

## 2019-12-20 LAB
ANION GAP SERPL CALCULATED.3IONS-SCNC: 9 MMOL/L (ref 5–18)
BUN SERPL-MCNC: 24 MG/DL (ref 8–28)
CALCIUM SERPL-MCNC: 8.3 MG/DL (ref 8.5–10.5)
CHLORIDE BLD-SCNC: 107 MMOL/L (ref 98–107)
CO2 SERPL-SCNC: 24 MMOL/L (ref 22–31)
CREAT SERPL-MCNC: 1.56 MG/DL (ref 0.6–1.1)
GFR SERPL CREATININE-BSD FRML MDRD: 32 ML/MIN/1.73M2
GLUCOSE BLD-MCNC: 76 MG/DL (ref 70–125)
INR PPP: 3.7 (ref 0.9–1.1)
POTASSIUM BLD-SCNC: 3.7 MMOL/L (ref 3.5–5)
SODIUM SERPL-SCNC: 140 MMOL/L (ref 136–145)

## 2019-12-23 ENCOUNTER — COMMUNICATION - HEALTHEAST (OUTPATIENT)
Dept: SCHEDULING | Facility: CLINIC | Age: 79
End: 2019-12-23

## 2019-12-24 ENCOUNTER — COMMUNICATION - HEALTHEAST (OUTPATIENT)
Dept: FAMILY MEDICINE | Facility: CLINIC | Age: 79
End: 2019-12-24

## 2019-12-24 ENCOUNTER — INFUSION - HEALTHEAST (OUTPATIENT)
Dept: INFUSION THERAPY | Facility: CLINIC | Age: 79
End: 2019-12-24

## 2019-12-24 ENCOUNTER — RECORDS - HEALTHEAST (OUTPATIENT)
Dept: ADMINISTRATIVE | Facility: OTHER | Age: 79
End: 2019-12-24

## 2019-12-24 DIAGNOSIS — D50.8 IRON DEFICIENCY ANEMIA SECONDARY TO INADEQUATE DIETARY IRON INTAKE: ICD-10-CM

## 2019-12-24 DIAGNOSIS — N18.31 CHRONIC KIDNEY DISEASE (CKD) STAGE G3A/A1, MODERATELY DECREASED GLOMERULAR FILTRATION RATE (GFR) BETWEEN 45-59 ML/MIN/1.73 SQUARE METER AND ALBUMINURIA CREATININE RATIO LESS THAN 30 MG/G (H): ICD-10-CM

## 2019-12-26 ENCOUNTER — INFUSION - HEALTHEAST (OUTPATIENT)
Dept: INFUSION THERAPY | Facility: CLINIC | Age: 79
End: 2019-12-26

## 2019-12-26 DIAGNOSIS — N18.31 CHRONIC KIDNEY DISEASE (CKD) STAGE G3A/A1, MODERATELY DECREASED GLOMERULAR FILTRATION RATE (GFR) BETWEEN 45-59 ML/MIN/1.73 SQUARE METER AND ALBUMINURIA CREATININE RATIO LESS THAN 30 MG/G (H): ICD-10-CM

## 2019-12-26 DIAGNOSIS — D50.8 IRON DEFICIENCY ANEMIA SECONDARY TO INADEQUATE DIETARY IRON INTAKE: ICD-10-CM

## 2019-12-27 ENCOUNTER — HOSPITAL ENCOUNTER (OUTPATIENT)
Dept: PALLIATIVE MEDICINE | Facility: OTHER | Age: 79
Discharge: HOME | End: 2019-12-27
Attending: ANESTHESIOLOGY

## 2019-12-27 DIAGNOSIS — M54.16 LUMBAR RADICULOPATHY: ICD-10-CM

## 2019-12-27 ASSESSMENT — MIFFLIN-ST. JEOR: SCORE: 1000.7

## 2019-12-31 ENCOUNTER — INFUSION - HEALTHEAST (OUTPATIENT)
Dept: INFUSION THERAPY | Facility: CLINIC | Age: 79
End: 2019-12-31

## 2019-12-31 DIAGNOSIS — D50.8 IRON DEFICIENCY ANEMIA SECONDARY TO INADEQUATE DIETARY IRON INTAKE: ICD-10-CM

## 2019-12-31 DIAGNOSIS — N18.31 CHRONIC KIDNEY DISEASE (CKD) STAGE G3A/A1, MODERATELY DECREASED GLOMERULAR FILTRATION RATE (GFR) BETWEEN 45-59 ML/MIN/1.73 SQUARE METER AND ALBUMINURIA CREATININE RATIO LESS THAN 30 MG/G (H): ICD-10-CM

## 2020-01-02 ENCOUNTER — COMMUNICATION - HEALTHEAST (OUTPATIENT)
Dept: ANTICOAGULATION | Facility: CLINIC | Age: 80
End: 2020-01-02

## 2020-01-02 ENCOUNTER — AMBULATORY - HEALTHEAST (OUTPATIENT)
Dept: LAB | Facility: CLINIC | Age: 80
End: 2020-01-02

## 2020-01-02 ENCOUNTER — INFUSION - HEALTHEAST (OUTPATIENT)
Dept: INFUSION THERAPY | Facility: CLINIC | Age: 80
End: 2020-01-02

## 2020-01-02 DIAGNOSIS — D50.8 IRON DEFICIENCY ANEMIA SECONDARY TO INADEQUATE DIETARY IRON INTAKE: ICD-10-CM

## 2020-01-02 DIAGNOSIS — N18.31 CHRONIC KIDNEY DISEASE (CKD) STAGE G3A/A1, MODERATELY DECREASED GLOMERULAR FILTRATION RATE (GFR) BETWEEN 45-59 ML/MIN/1.73 SQUARE METER AND ALBUMINURIA CREATININE RATIO LESS THAN 30 MG/G (H): ICD-10-CM

## 2020-01-02 DIAGNOSIS — I26.99 PULMONARY EMBOLISM (H): ICD-10-CM

## 2020-01-02 DIAGNOSIS — I27.82 OTHER CHRONIC PULMONARY EMBOLISM WITHOUT ACUTE COR PULMONALE (H): ICD-10-CM

## 2020-01-02 LAB — INR PPP: 1.6 (ref 0.9–1.1)

## 2020-01-06 ENCOUNTER — INFUSION - HEALTHEAST (OUTPATIENT)
Dept: INFUSION THERAPY | Facility: CLINIC | Age: 80
End: 2020-01-06

## 2020-01-06 DIAGNOSIS — D50.8 IRON DEFICIENCY ANEMIA SECONDARY TO INADEQUATE DIETARY IRON INTAKE: ICD-10-CM

## 2020-01-06 DIAGNOSIS — N18.31 CHRONIC KIDNEY DISEASE (CKD) STAGE G3A/A1, MODERATELY DECREASED GLOMERULAR FILTRATION RATE (GFR) BETWEEN 45-59 ML/MIN/1.73 SQUARE METER AND ALBUMINURIA CREATININE RATIO LESS THAN 30 MG/G (H): ICD-10-CM

## 2020-01-17 ENCOUNTER — COMMUNICATION - HEALTHEAST (OUTPATIENT)
Dept: ANTICOAGULATION | Facility: CLINIC | Age: 80
End: 2020-01-17

## 2020-01-17 ENCOUNTER — RECORDS - HEALTHEAST (OUTPATIENT)
Dept: ADMINISTRATIVE | Facility: OTHER | Age: 80
End: 2020-01-17

## 2020-01-17 ENCOUNTER — OFFICE VISIT - HEALTHEAST (OUTPATIENT)
Dept: FAMILY MEDICINE | Facility: CLINIC | Age: 80
End: 2020-01-17

## 2020-01-17 DIAGNOSIS — I26.99 PULMONARY EMBOLISM (H): ICD-10-CM

## 2020-01-17 DIAGNOSIS — Z63.9 FAMILY RELATIONSHIP PROBLEM: ICD-10-CM

## 2020-01-17 DIAGNOSIS — R74.8 ELEVATED LIPASE: ICD-10-CM

## 2020-01-17 DIAGNOSIS — I27.82 OTHER CHRONIC PULMONARY EMBOLISM WITHOUT ACUTE COR PULMONALE (H): ICD-10-CM

## 2020-01-17 DIAGNOSIS — N18.31 CHRONIC KIDNEY DISEASE (CKD) STAGE G3A/A1, MODERATELY DECREASED GLOMERULAR FILTRATION RATE (GFR) BETWEEN 45-59 ML/MIN/1.73 SQUARE METER AND ALBUMINURIA CREATININE RATIO LESS THAN 30 MG/G (H): ICD-10-CM

## 2020-01-17 DIAGNOSIS — F33.1 MODERATE EPISODE OF RECURRENT MAJOR DEPRESSIVE DISORDER (H): ICD-10-CM

## 2020-01-17 DIAGNOSIS — D64.9 ANEMIA, UNSPECIFIED TYPE: ICD-10-CM

## 2020-01-17 LAB
ALBUMIN SERPL-MCNC: 3.7 G/DL (ref 3.5–5)
ALP SERPL-CCNC: 44 U/L (ref 45–120)
ALT SERPL W P-5'-P-CCNC: <9 U/L (ref 0–45)
ANION GAP SERPL CALCULATED.3IONS-SCNC: 11 MMOL/L (ref 5–18)
AST SERPL W P-5'-P-CCNC: 15 U/L (ref 0–40)
BASOPHILS # BLD AUTO: 0 THOU/UL (ref 0–0.2)
BASOPHILS NFR BLD AUTO: 1 % (ref 0–2)
BILIRUB SERPL-MCNC: 0.5 MG/DL (ref 0–1)
BUN SERPL-MCNC: 15 MG/DL (ref 8–28)
CALCIUM SERPL-MCNC: 8.3 MG/DL (ref 8.5–10.5)
CHLORIDE BLD-SCNC: 103 MMOL/L (ref 98–107)
CO2 SERPL-SCNC: 25 MMOL/L (ref 22–31)
CREAT SERPL-MCNC: 1.3 MG/DL (ref 0.6–1.1)
EOSINOPHIL # BLD AUTO: 0 THOU/UL (ref 0–0.4)
EOSINOPHIL NFR BLD AUTO: 1 % (ref 0–6)
ERYTHROCYTE [DISTWIDTH] IN BLOOD BY AUTOMATED COUNT: 12.9 % (ref 11–14.5)
FERRITIN SERPL-MCNC: 420 NG/ML (ref 10–130)
GFR SERPL CREATININE-BSD FRML MDRD: 40 ML/MIN/1.73M2
GLUCOSE BLD-MCNC: 213 MG/DL (ref 70–125)
HCT VFR BLD AUTO: 39.1 % (ref 35–47)
HGB BLD-MCNC: 12.2 G/DL (ref 12–16)
INR PPP: 1.9 (ref 0.9–1.1)
IRON SATN MFR SERPL: 23 % (ref 20–50)
IRON SERPL-MCNC: 53 UG/DL (ref 42–175)
LIPASE SERPL-CCNC: 220 U/L (ref 0–52)
LYMPHOCYTES # BLD AUTO: 0.8 THOU/UL (ref 0.8–4.4)
LYMPHOCYTES NFR BLD AUTO: 22 % (ref 20–40)
MCH RBC QN AUTO: 32.1 PG (ref 27–34)
MCHC RBC AUTO-ENTMCNC: 31.2 G/DL (ref 32–36)
MCV RBC AUTO: 103 FL (ref 80–100)
MONOCYTES # BLD AUTO: 0.4 THOU/UL (ref 0–0.9)
MONOCYTES NFR BLD AUTO: 10 % (ref 2–10)
NEUTROPHILS # BLD AUTO: 2.5 THOU/UL (ref 2–7.7)
NEUTROPHILS NFR BLD AUTO: 66 % (ref 50–70)
PLATELET # BLD AUTO: 154 THOU/UL (ref 140–440)
PMV BLD AUTO: 10.1 FL (ref 8.5–12.5)
POTASSIUM BLD-SCNC: 3.9 MMOL/L (ref 3.5–5)
PROT SERPL-MCNC: 6.4 G/DL (ref 6–8)
RBC # BLD AUTO: 3.8 MILL/UL (ref 3.8–5.4)
SODIUM SERPL-SCNC: 139 MMOL/L (ref 136–145)
TIBC SERPL-MCNC: 232 UG/DL (ref 313–563)
TRANSFERRIN SERPL-MCNC: 186 MG/DL (ref 212–360)
WBC: 3.8 THOU/UL (ref 4–11)

## 2020-01-17 SDOH — SOCIAL STABILITY - SOCIAL INSECURITY: PROBLEM RELATED TO PRIMARY SUPPORT GROUP, UNSPECIFIED: Z63.9

## 2020-01-23 ENCOUNTER — COMMUNICATION - HEALTHEAST (OUTPATIENT)
Dept: FAMILY MEDICINE | Facility: CLINIC | Age: 80
End: 2020-01-23

## 2020-01-23 DIAGNOSIS — R73.9 HYPERGLYCEMIA: ICD-10-CM

## 2020-01-28 ENCOUNTER — COMMUNICATION - HEALTHEAST (OUTPATIENT)
Dept: PALLIATIVE MEDICINE | Facility: OTHER | Age: 80
End: 2020-01-28

## 2020-01-28 DIAGNOSIS — M54.16 LUMBAR RADICULOPATHY: ICD-10-CM

## 2020-01-28 DIAGNOSIS — G90.511 COMPLEX REGIONAL PAIN SYNDROME TYPE 1 OF RIGHT UPPER EXTREMITY: ICD-10-CM

## 2020-01-31 ENCOUNTER — OFFICE VISIT - HEALTHEAST (OUTPATIENT)
Dept: FAMILY MEDICINE | Facility: CLINIC | Age: 80
End: 2020-01-31

## 2020-01-31 ENCOUNTER — COMMUNICATION - HEALTHEAST (OUTPATIENT)
Dept: ANTICOAGULATION | Facility: CLINIC | Age: 80
End: 2020-01-31

## 2020-01-31 DIAGNOSIS — F32.1 MODERATE MAJOR DEPRESSION (H): ICD-10-CM

## 2020-01-31 DIAGNOSIS — I26.99 PULMONARY EMBOLISM (H): ICD-10-CM

## 2020-01-31 DIAGNOSIS — K59.00 CONSTIPATION, UNSPECIFIED CONSTIPATION TYPE: ICD-10-CM

## 2020-01-31 DIAGNOSIS — I27.82 OTHER CHRONIC PULMONARY EMBOLISM WITHOUT ACUTE COR PULMONALE (H): ICD-10-CM

## 2020-01-31 DIAGNOSIS — G44.029 CHRONIC CLUSTER HEADACHE, NOT INTRACTABLE: ICD-10-CM

## 2020-01-31 LAB — INR PPP: 2 (ref 0.9–1.1)

## 2020-02-06 ENCOUNTER — OFFICE VISIT - HEALTHEAST (OUTPATIENT)
Dept: FAMILY MEDICINE | Facility: CLINIC | Age: 80
End: 2020-02-06

## 2020-02-06 ENCOUNTER — COMMUNICATION - HEALTHEAST (OUTPATIENT)
Dept: SCHEDULING | Facility: CLINIC | Age: 80
End: 2020-02-06

## 2020-02-06 DIAGNOSIS — B02.8 HERPES ZOSTER WITH COMPLICATION: ICD-10-CM

## 2020-02-06 DIAGNOSIS — M25.50 ARTHRALGIA OF MULTIPLE JOINTS: ICD-10-CM

## 2020-02-06 DIAGNOSIS — R73.9 HYPERGLYCEMIA: ICD-10-CM

## 2020-02-06 DIAGNOSIS — I10 ESSENTIAL HYPERTENSION: ICD-10-CM

## 2020-02-06 LAB
ANION GAP SERPL CALCULATED.3IONS-SCNC: 11 MMOL/L (ref 5–18)
BASOPHILS # BLD AUTO: 0 THOU/UL (ref 0–0.2)
BASOPHILS NFR BLD AUTO: 0 % (ref 0–2)
BUN SERPL-MCNC: 22 MG/DL (ref 8–28)
CALCIUM SERPL-MCNC: 8.6 MG/DL (ref 8.5–10.5)
CHLORIDE BLD-SCNC: 103 MMOL/L (ref 98–107)
CO2 SERPL-SCNC: 24 MMOL/L (ref 22–31)
CREAT SERPL-MCNC: 1.53 MG/DL (ref 0.6–1.1)
EOSINOPHIL # BLD AUTO: 0.2 THOU/UL (ref 0–0.4)
EOSINOPHIL NFR BLD AUTO: 4 % (ref 0–6)
ERYTHROCYTE [DISTWIDTH] IN BLOOD BY AUTOMATED COUNT: 11.8 % (ref 11–14.5)
GFR SERPL CREATININE-BSD FRML MDRD: 33 ML/MIN/1.73M2
GLUCOSE BLD-MCNC: 93 MG/DL (ref 70–125)
HBA1C MFR BLD: 4.9 % (ref 3.5–6)
HCT VFR BLD AUTO: 36.7 % (ref 35–47)
HGB BLD-MCNC: 12.4 G/DL (ref 12–16)
LYMPHOCYTES # BLD AUTO: 1.3 THOU/UL (ref 0.8–4.4)
LYMPHOCYTES NFR BLD AUTO: 24 % (ref 20–40)
MCH RBC QN AUTO: 32.9 PG (ref 27–34)
MCHC RBC AUTO-ENTMCNC: 33.8 G/DL (ref 32–36)
MCV RBC AUTO: 97 FL (ref 80–100)
MONOCYTES # BLD AUTO: 0.5 THOU/UL (ref 0–0.9)
MONOCYTES NFR BLD AUTO: 9 % (ref 2–10)
NEUTROPHILS # BLD AUTO: 3.3 THOU/UL (ref 2–7.7)
NEUTROPHILS NFR BLD AUTO: 62 % (ref 50–70)
PLATELET # BLD AUTO: 179 THOU/UL (ref 140–440)
PMV BLD AUTO: 7.3 FL (ref 7–10)
POTASSIUM BLD-SCNC: 3.6 MMOL/L (ref 3.5–5)
RBC # BLD AUTO: 3.77 MILL/UL (ref 3.8–5.4)
SODIUM SERPL-SCNC: 138 MMOL/L (ref 136–145)
WBC: 5.3 THOU/UL (ref 4–11)

## 2020-02-11 ENCOUNTER — COMMUNICATION - HEALTHEAST (OUTPATIENT)
Dept: PALLIATIVE MEDICINE | Facility: OTHER | Age: 80
End: 2020-02-11

## 2020-02-14 ENCOUNTER — COMMUNICATION - HEALTHEAST (OUTPATIENT)
Dept: FAMILY MEDICINE | Facility: CLINIC | Age: 80
End: 2020-02-14

## 2020-02-14 ENCOUNTER — COMMUNICATION - HEALTHEAST (OUTPATIENT)
Dept: ANTICOAGULATION | Facility: CLINIC | Age: 80
End: 2020-02-14

## 2020-02-14 ENCOUNTER — AMBULATORY - HEALTHEAST (OUTPATIENT)
Dept: LAB | Facility: CLINIC | Age: 80
End: 2020-02-14

## 2020-02-14 DIAGNOSIS — I27.82 OTHER CHRONIC PULMONARY EMBOLISM WITHOUT ACUTE COR PULMONALE (H): ICD-10-CM

## 2020-02-14 DIAGNOSIS — I26.99 PULMONARY EMBOLISM (H): ICD-10-CM

## 2020-02-14 LAB — INR PPP: 1.5 (ref 0.9–1.1)

## 2020-02-19 ENCOUNTER — HOSPITAL ENCOUNTER (OUTPATIENT)
Dept: PALLIATIVE MEDICINE | Facility: OTHER | Age: 80
Discharge: HOME | End: 2020-02-19
Attending: ANESTHESIOLOGY

## 2020-02-19 DIAGNOSIS — G89.4 CHRONIC PAIN SYNDROME: ICD-10-CM

## 2020-02-19 ASSESSMENT — MIFFLIN-ST. JEOR: SCORE: 995.71

## 2020-02-21 LAB
6MAM UR QL: NOT DETECTED
7AMINOCLONAZEPAM UR QL: NOT DETECTED
A-OH ALPRAZ UR QL: NOT DETECTED
ALPRAZ UR QL: NOT DETECTED
AMPHET UR QL SCN: NOT DETECTED
BARBITURATES UR QL: NOT DETECTED
BUPRENORPHINE UR QL: NOT DETECTED
BZE UR QL: NOT DETECTED
CARBOXYTHC UR QL: NOT DETECTED
CARISOPRODOL UR QL: NOT DETECTED
CLONAZEPAM UR QL: NOT DETECTED
CODEINE UR QL: NOT DETECTED
CREAT UR-MCNC: 155.5 MG/DL (ref 20–400)
DIAZEPAM UR QL: NOT DETECTED
ETHYL GLUCURONIDE UR QL: NOT DETECTED
FENTANYL UR QL: PRESENT
HYDROCODONE UR QL: NOT DETECTED
HYDROMORPHONE UR QL: NOT DETECTED
LORAZEPAM UR QL: NOT DETECTED
MDA UR QL: NOT DETECTED
MDEA UR QL: NOT DETECTED
MDMA UR QL: NOT DETECTED
ME-PHENIDATE UR QL: NOT DETECTED
MEPERIDINE UR QL: NOT DETECTED
METHADONE UR QL: NOT DETECTED
METHAMPHET UR QL: NOT DETECTED
MIDAZOLAM UR QL SCN: NOT DETECTED
MORPHINE UR QL: NOT DETECTED
NORBUPRENORPHINE UR QL CFM: NOT DETECTED
NORDIAZEPAM UR QL: NOT DETECTED
NORFENTANYL UR QL: PRESENT
NORHYDROCODONE UR QL CFM: NOT DETECTED
NOROXYCODONE UR QL CFM: NOT DETECTED
NOROXYMORPHONE UR QL SCN: NOT DETECTED
OXAZEPAM UR QL: NOT DETECTED
OXYCODONE UR QL: NOT DETECTED
OXYMORPHONE UR QL: NOT DETECTED
PATHOLOGY STUDY: NORMAL
PCP UR QL: NOT DETECTED
PHENTERMINE UR QL: NOT DETECTED
PROPOXYPH UR QL: NOT DETECTED
SERVICE CMNT-IMP: NORMAL
TAPENTADOL UR QL SCN: NOT DETECTED
TAPENTADOL UR QL SCN: NOT DETECTED
TEMAZEPAM UR QL: NOT DETECTED
TRAMADOL UR QL: NOT DETECTED
ZOLPIDEM UR QL: NOT DETECTED

## 2020-02-27 ENCOUNTER — COMMUNICATION - HEALTHEAST (OUTPATIENT)
Dept: FAMILY MEDICINE | Facility: CLINIC | Age: 80
End: 2020-02-27

## 2020-02-28 ENCOUNTER — OFFICE VISIT - HEALTHEAST (OUTPATIENT)
Dept: FAMILY MEDICINE | Facility: CLINIC | Age: 80
End: 2020-02-28

## 2020-02-28 ENCOUNTER — COMMUNICATION - HEALTHEAST (OUTPATIENT)
Dept: PALLIATIVE MEDICINE | Facility: OTHER | Age: 80
End: 2020-02-28

## 2020-02-28 ENCOUNTER — RECORDS - HEALTHEAST (OUTPATIENT)
Dept: ADMINISTRATIVE | Facility: OTHER | Age: 80
End: 2020-02-28

## 2020-02-28 ENCOUNTER — COMMUNICATION - HEALTHEAST (OUTPATIENT)
Dept: ANTICOAGULATION | Facility: CLINIC | Age: 80
End: 2020-02-28

## 2020-02-28 ENCOUNTER — COMMUNICATION - HEALTHEAST (OUTPATIENT)
Dept: SCHEDULING | Facility: CLINIC | Age: 80
End: 2020-02-28

## 2020-02-28 DIAGNOSIS — I27.82 OTHER CHRONIC PULMONARY EMBOLISM WITHOUT ACUTE COR PULMONALE (H): ICD-10-CM

## 2020-02-28 DIAGNOSIS — E78.2 MIXED HYPERLIPIDEMIA: ICD-10-CM

## 2020-02-28 DIAGNOSIS — K59.00 CONSTIPATION, UNSPECIFIED CONSTIPATION TYPE: ICD-10-CM

## 2020-02-28 DIAGNOSIS — M54.16 LUMBAR RADICULOPATHY: ICD-10-CM

## 2020-02-28 DIAGNOSIS — I26.99 PULMONARY EMBOLISM (H): ICD-10-CM

## 2020-02-28 DIAGNOSIS — E61.1 IRON DEFICIENCY: ICD-10-CM

## 2020-02-28 DIAGNOSIS — N18.31 CHRONIC KIDNEY DISEASE (CKD) STAGE G3A/A1, MODERATELY DECREASED GLOMERULAR FILTRATION RATE (GFR) BETWEEN 45-59 ML/MIN/1.73 SQUARE METER AND ALBUMINURIA CREATININE RATIO LESS THAN 30 MG/G (H): ICD-10-CM

## 2020-02-28 DIAGNOSIS — G44.029 CHRONIC CLUSTER HEADACHE, NOT INTRACTABLE: ICD-10-CM

## 2020-02-28 LAB
ALBUMIN SERPL-MCNC: 3.9 G/DL (ref 3.5–5)
ALP SERPL-CCNC: 44 U/L (ref 45–120)
ALT SERPL W P-5'-P-CCNC: 10 U/L (ref 0–45)
ANION GAP SERPL CALCULATED.3IONS-SCNC: 12 MMOL/L (ref 5–18)
AST SERPL W P-5'-P-CCNC: 18 U/L (ref 0–40)
BILIRUB DIRECT SERPL-MCNC: 0.2 MG/DL
BILIRUB SERPL-MCNC: 0.5 MG/DL (ref 0–1)
BUN SERPL-MCNC: 17 MG/DL (ref 8–28)
CALCIUM SERPL-MCNC: 8.9 MG/DL (ref 8.5–10.5)
CHLORIDE BLD-SCNC: 104 MMOL/L (ref 98–107)
CHOLEST SERPL-MCNC: 270 MG/DL
CO2 SERPL-SCNC: 21 MMOL/L (ref 22–31)
CREAT SERPL-MCNC: 1.08 MG/DL (ref 0.6–1.1)
FASTING STATUS PATIENT QL REPORTED: YES
FERRITIN SERPL-MCNC: 473 NG/ML (ref 10–130)
GFR SERPL CREATININE-BSD FRML MDRD: 49 ML/MIN/1.73M2
GLUCOSE BLD-MCNC: 88 MG/DL (ref 70–125)
HDLC SERPL-MCNC: 88 MG/DL
INR PPP: 3.2 (ref 0.9–1.1)
IRON SATN MFR SERPL: 30 % (ref 20–50)
IRON SERPL-MCNC: 72 UG/DL (ref 42–175)
LDLC SERPL CALC-MCNC: 162 MG/DL
POTASSIUM BLD-SCNC: 3.8 MMOL/L (ref 3.5–5)
PROT SERPL-MCNC: 6.7 G/DL (ref 6–8)
SODIUM SERPL-SCNC: 137 MMOL/L (ref 136–145)
TIBC SERPL-MCNC: 237 UG/DL (ref 313–563)
TRANSFERRIN SERPL-MCNC: 190 MG/DL (ref 212–360)
TRIGL SERPL-MCNC: 99 MG/DL

## 2020-03-04 ENCOUNTER — COMMUNICATION - HEALTHEAST (OUTPATIENT)
Dept: CARDIOLOGY | Facility: CLINIC | Age: 80
End: 2020-03-04

## 2020-03-04 DIAGNOSIS — E78.2 MIXED HYPERLIPIDEMIA: ICD-10-CM

## 2020-03-05 ENCOUNTER — COMMUNICATION - HEALTHEAST (OUTPATIENT)
Dept: FAMILY MEDICINE | Facility: CLINIC | Age: 80
End: 2020-03-05

## 2020-03-05 ENCOUNTER — RECORDS - HEALTHEAST (OUTPATIENT)
Dept: ADMINISTRATIVE | Facility: OTHER | Age: 80
End: 2020-03-05

## 2020-03-06 ENCOUNTER — COMMUNICATION - HEALTHEAST (OUTPATIENT)
Dept: SCHEDULING | Facility: CLINIC | Age: 80
End: 2020-03-06

## 2020-03-06 ENCOUNTER — COMMUNICATION - HEALTHEAST (OUTPATIENT)
Dept: CARDIOLOGY | Facility: CLINIC | Age: 80
End: 2020-03-06

## 2020-03-06 DIAGNOSIS — E78.2 MIXED HYPERLIPIDEMIA: ICD-10-CM

## 2020-03-09 ENCOUNTER — COMMUNICATION - HEALTHEAST (OUTPATIENT)
Dept: CARDIOLOGY | Facility: CLINIC | Age: 80
End: 2020-03-09

## 2020-03-09 ENCOUNTER — RECORDS - HEALTHEAST (OUTPATIENT)
Dept: ADMINISTRATIVE | Facility: OTHER | Age: 80
End: 2020-03-09

## 2020-03-10 ENCOUNTER — RECORDS - HEALTHEAST (OUTPATIENT)
Dept: GENERAL RADIOLOGY | Facility: CLINIC | Age: 80
End: 2020-03-10

## 2020-03-10 ENCOUNTER — OFFICE VISIT - HEALTHEAST (OUTPATIENT)
Dept: FAMILY MEDICINE | Facility: CLINIC | Age: 80
End: 2020-03-10

## 2020-03-10 ENCOUNTER — COMMUNICATION - HEALTHEAST (OUTPATIENT)
Dept: SCHEDULING | Facility: CLINIC | Age: 80
End: 2020-03-10

## 2020-03-10 DIAGNOSIS — K59.01 SLOW TRANSIT CONSTIPATION: ICD-10-CM

## 2020-03-10 ASSESSMENT — MIFFLIN-ST. JEOR: SCORE: 1019.52

## 2020-03-11 ENCOUNTER — OFFICE VISIT - HEALTHEAST (OUTPATIENT)
Dept: FAMILY MEDICINE | Facility: CLINIC | Age: 80
End: 2020-03-11

## 2020-03-11 ENCOUNTER — AMBULATORY - HEALTHEAST (OUTPATIENT)
Dept: ENDOCRINOLOGY | Facility: CLINIC | Age: 80
End: 2020-03-11

## 2020-03-11 ENCOUNTER — COMMUNICATION - HEALTHEAST (OUTPATIENT)
Dept: ANTICOAGULATION | Facility: CLINIC | Age: 80
End: 2020-03-11

## 2020-03-11 DIAGNOSIS — M70.61 TROCHANTERIC BURSITIS OF RIGHT HIP: ICD-10-CM

## 2020-03-11 DIAGNOSIS — M85.89 OSTEOPENIA OF MULTIPLE SITES: ICD-10-CM

## 2020-03-11 DIAGNOSIS — N18.31 CHRONIC KIDNEY DISEASE (CKD) STAGE G3A/A1, MODERATELY DECREASED GLOMERULAR FILTRATION RATE (GFR) BETWEEN 45-59 ML/MIN/1.73 SQUARE METER AND ALBUMINURIA CREATININE RATIO LESS THAN 30 MG/G (H): ICD-10-CM

## 2020-03-11 DIAGNOSIS — I26.99 PULMONARY EMBOLISM (H): ICD-10-CM

## 2020-03-11 DIAGNOSIS — I27.82 OTHER CHRONIC PULMONARY EMBOLISM WITHOUT ACUTE COR PULMONALE (H): ICD-10-CM

## 2020-03-11 DIAGNOSIS — K59.00 CONSTIPATION, UNSPECIFIED CONSTIPATION TYPE: ICD-10-CM

## 2020-03-11 DIAGNOSIS — G90.523 COMPLEX REGIONAL PAIN SYNDROME TYPE 1 OF BOTH LOWER EXTREMITIES: ICD-10-CM

## 2020-03-11 DIAGNOSIS — M17.0 PRIMARY OSTEOARTHRITIS OF BOTH KNEES: ICD-10-CM

## 2020-03-11 LAB — INR PPP: 3.4 (ref 0.9–1.1)

## 2020-03-23 ENCOUNTER — COMMUNICATION - HEALTHEAST (OUTPATIENT)
Dept: FAMILY MEDICINE | Facility: CLINIC | Age: 80
End: 2020-03-23

## 2020-03-23 ENCOUNTER — COMMUNICATION - HEALTHEAST (OUTPATIENT)
Dept: PALLIATIVE MEDICINE | Facility: OTHER | Age: 80
End: 2020-03-23

## 2020-03-23 ENCOUNTER — AMBULATORY - HEALTHEAST (OUTPATIENT)
Dept: LAB | Facility: CLINIC | Age: 80
End: 2020-03-23

## 2020-03-23 ENCOUNTER — COMMUNICATION - HEALTHEAST (OUTPATIENT)
Dept: ANTICOAGULATION | Facility: CLINIC | Age: 80
End: 2020-03-23

## 2020-03-23 DIAGNOSIS — I26.99 PULMONARY EMBOLISM (H): ICD-10-CM

## 2020-03-23 DIAGNOSIS — M54.16 LUMBAR RADICULOPATHY: ICD-10-CM

## 2020-03-23 DIAGNOSIS — I27.82 OTHER CHRONIC PULMONARY EMBOLISM WITHOUT ACUTE COR PULMONALE (H): ICD-10-CM

## 2020-03-23 LAB — INR PPP: 1.6 (ref 0.9–1.1)

## 2020-04-02 ENCOUNTER — COMMUNICATION - HEALTHEAST (OUTPATIENT)
Dept: FAMILY MEDICINE | Facility: CLINIC | Age: 80
End: 2020-04-02

## 2020-04-03 ENCOUNTER — AMBULATORY - HEALTHEAST (OUTPATIENT)
Dept: FAMILY MEDICINE | Facility: CLINIC | Age: 80
End: 2020-04-03

## 2020-04-03 ENCOUNTER — RECORDS - HEALTHEAST (OUTPATIENT)
Dept: ADMINISTRATIVE | Facility: OTHER | Age: 80
End: 2020-04-03

## 2020-04-03 DIAGNOSIS — E61.1 IRON DEFICIENCY: ICD-10-CM

## 2020-04-03 DIAGNOSIS — I10 ESSENTIAL HYPERTENSION: ICD-10-CM

## 2020-04-03 DIAGNOSIS — N18.31 CHRONIC KIDNEY DISEASE (CKD) STAGE G3A/A1, MODERATELY DECREASED GLOMERULAR FILTRATION RATE (GFR) BETWEEN 45-59 ML/MIN/1.73 SQUARE METER AND ALBUMINURIA CREATININE RATIO LESS THAN 30 MG/G (H): ICD-10-CM

## 2020-04-06 ENCOUNTER — AMBULATORY - HEALTHEAST (OUTPATIENT)
Dept: LAB | Facility: CLINIC | Age: 80
End: 2020-04-06

## 2020-04-06 ENCOUNTER — COMMUNICATION - HEALTHEAST (OUTPATIENT)
Dept: ANTICOAGULATION | Facility: CLINIC | Age: 80
End: 2020-04-06

## 2020-04-06 ENCOUNTER — COMMUNICATION - HEALTHEAST (OUTPATIENT)
Dept: CARDIOLOGY | Facility: CLINIC | Age: 80
End: 2020-04-06

## 2020-04-06 DIAGNOSIS — I27.82 OTHER CHRONIC PULMONARY EMBOLISM WITHOUT ACUTE COR PULMONALE (H): ICD-10-CM

## 2020-04-06 DIAGNOSIS — I10 ESSENTIAL HYPERTENSION: ICD-10-CM

## 2020-04-06 DIAGNOSIS — N18.31 CHRONIC KIDNEY DISEASE (CKD) STAGE G3A/A1, MODERATELY DECREASED GLOMERULAR FILTRATION RATE (GFR) BETWEEN 45-59 ML/MIN/1.73 SQUARE METER AND ALBUMINURIA CREATININE RATIO LESS THAN 30 MG/G (H): ICD-10-CM

## 2020-04-06 DIAGNOSIS — E61.1 IRON DEFICIENCY: ICD-10-CM

## 2020-04-06 DIAGNOSIS — I26.99 PULMONARY EMBOLISM (H): ICD-10-CM

## 2020-04-06 LAB
ALBUMIN SERPL-MCNC: 3.5 G/DL (ref 3.5–5)
ALP SERPL-CCNC: 40 U/L (ref 45–120)
ALT SERPL W P-5'-P-CCNC: 19 U/L (ref 0–45)
ANION GAP SERPL CALCULATED.3IONS-SCNC: 11 MMOL/L (ref 5–18)
AST SERPL W P-5'-P-CCNC: 21 U/L (ref 0–40)
BILIRUB SERPL-MCNC: 0.7 MG/DL (ref 0–1)
BUN SERPL-MCNC: 21 MG/DL (ref 8–28)
CALCIUM SERPL-MCNC: 8.2 MG/DL (ref 8.5–10.5)
CHLORIDE BLD-SCNC: 105 MMOL/L (ref 98–107)
CO2 SERPL-SCNC: 23 MMOL/L (ref 22–31)
CREAT SERPL-MCNC: 1.45 MG/DL (ref 0.6–1.1)
ERYTHROCYTE [DISTWIDTH] IN BLOOD BY AUTOMATED COUNT: 13.3 % (ref 11–14.5)
FERRITIN SERPL-MCNC: 365 NG/ML (ref 10–130)
GFR SERPL CREATININE-BSD FRML MDRD: 35 ML/MIN/1.73M2
GLUCOSE BLD-MCNC: 104 MG/DL (ref 70–125)
HCT VFR BLD AUTO: 36.6 % (ref 35–47)
HGB BLD-MCNC: 12.4 G/DL (ref 12–16)
INR PPP: 3 (ref 0.9–1.1)
IRON SATN MFR SERPL: 32 % (ref 20–50)
IRON SERPL-MCNC: 73 UG/DL (ref 42–175)
MCH RBC QN AUTO: 32.8 PG (ref 27–34)
MCHC RBC AUTO-ENTMCNC: 33.9 G/DL (ref 32–36)
MCV RBC AUTO: 97 FL (ref 80–100)
PLATELET # BLD AUTO: 136 THOU/UL (ref 140–440)
PMV BLD AUTO: 7.6 FL (ref 7–10)
POTASSIUM BLD-SCNC: 4.1 MMOL/L (ref 3.5–5)
PROT SERPL-MCNC: 6.1 G/DL (ref 6–8)
RBC # BLD AUTO: 3.78 MILL/UL (ref 3.8–5.4)
SODIUM SERPL-SCNC: 139 MMOL/L (ref 136–145)
TIBC SERPL-MCNC: 230 UG/DL (ref 313–563)
TRANSFERRIN SERPL-MCNC: 184 MG/DL (ref 212–360)
WBC: 4.4 THOU/UL (ref 4–11)

## 2020-04-07 ENCOUNTER — COMMUNICATION - HEALTHEAST (OUTPATIENT)
Dept: FAMILY MEDICINE | Facility: CLINIC | Age: 80
End: 2020-04-07

## 2020-04-07 ENCOUNTER — RECORDS - HEALTHEAST (OUTPATIENT)
Dept: ADMINISTRATIVE | Facility: OTHER | Age: 80
End: 2020-04-07

## 2020-04-08 ENCOUNTER — HOSPITAL ENCOUNTER (OUTPATIENT)
Dept: RADIOLOGY | Facility: CLINIC | Age: 80
Discharge: HOME | End: 2020-04-08
Attending: INTERNAL MEDICINE

## 2020-04-08 ENCOUNTER — HOSPITAL ENCOUNTER (OUTPATIENT)
Dept: ULTRASOUND IMAGING | Facility: CLINIC | Age: 80
Discharge: HOME | End: 2020-04-08
Attending: FAMILY MEDICINE

## 2020-04-08 ENCOUNTER — OFFICE VISIT - HEALTHEAST (OUTPATIENT)
Dept: FAMILY MEDICINE | Facility: CLINIC | Age: 80
End: 2020-04-08

## 2020-04-08 DIAGNOSIS — G44.029 CHRONIC CLUSTER HEADACHE, NOT INTRACTABLE: ICD-10-CM

## 2020-04-08 DIAGNOSIS — G89.4 CHRONIC PAIN SYNDROME: ICD-10-CM

## 2020-04-08 DIAGNOSIS — N18.31 CHRONIC KIDNEY DISEASE (CKD) STAGE G3A/A1, MODERATELY DECREASED GLOMERULAR FILTRATION RATE (GFR) BETWEEN 45-59 ML/MIN/1.73 SQUARE METER AND ALBUMINURIA CREATININE RATIO LESS THAN 30 MG/G (H): ICD-10-CM

## 2020-04-08 DIAGNOSIS — E03.9 ACQUIRED HYPOTHYROIDISM: ICD-10-CM

## 2020-04-08 DIAGNOSIS — N18.30 STAGE 3 CHRONIC KIDNEY DISEASE (H): ICD-10-CM

## 2020-04-08 DIAGNOSIS — K59.00 CONSTIPATION, UNSPECIFIED CONSTIPATION TYPE: ICD-10-CM

## 2020-04-08 DIAGNOSIS — K59.00 CONSTIPATION, UNSPECIFIED: ICD-10-CM

## 2020-04-09 ENCOUNTER — COMMUNICATION - HEALTHEAST (OUTPATIENT)
Dept: FAMILY MEDICINE | Facility: CLINIC | Age: 80
End: 2020-04-09

## 2020-04-09 DIAGNOSIS — N20.0 NEPHROLITHIASIS: ICD-10-CM

## 2020-04-09 DIAGNOSIS — N05.1 FOCAL GLOMERULAR SCLEROSIS: ICD-10-CM

## 2020-04-10 ENCOUNTER — RECORDS - HEALTHEAST (OUTPATIENT)
Dept: ADMINISTRATIVE | Facility: OTHER | Age: 80
End: 2020-04-10

## 2020-04-20 ENCOUNTER — COMMUNICATION - HEALTHEAST (OUTPATIENT)
Dept: ANTICOAGULATION | Facility: CLINIC | Age: 80
End: 2020-04-20

## 2020-04-20 ENCOUNTER — AMBULATORY - HEALTHEAST (OUTPATIENT)
Dept: LAB | Facility: CLINIC | Age: 80
End: 2020-04-20

## 2020-04-20 ENCOUNTER — COMMUNICATION - HEALTHEAST (OUTPATIENT)
Dept: ADMINISTRATIVE | Facility: CLINIC | Age: 80
End: 2020-04-20

## 2020-04-20 DIAGNOSIS — N18.31 CHRONIC KIDNEY DISEASE (CKD) STAGE G3A/A1, MODERATELY DECREASED GLOMERULAR FILTRATION RATE (GFR) BETWEEN 45-59 ML/MIN/1.73 SQUARE METER AND ALBUMINURIA CREATININE RATIO LESS THAN 30 MG/G (H): ICD-10-CM

## 2020-04-20 DIAGNOSIS — R10.9 FLANK PAIN: ICD-10-CM

## 2020-04-20 DIAGNOSIS — N20.0 KIDNEY STONE: ICD-10-CM

## 2020-04-20 DIAGNOSIS — E03.9 ACQUIRED HYPOTHYROIDISM: ICD-10-CM

## 2020-04-20 DIAGNOSIS — I27.82 OTHER CHRONIC PULMONARY EMBOLISM WITHOUT ACUTE COR PULMONALE (H): ICD-10-CM

## 2020-04-20 DIAGNOSIS — I26.99 PULMONARY EMBOLISM (H): ICD-10-CM

## 2020-04-20 LAB
ANION GAP SERPL CALCULATED.3IONS-SCNC: 9 MMOL/L (ref 5–18)
BUN SERPL-MCNC: 31 MG/DL (ref 8–28)
CALCIUM SERPL-MCNC: 8.3 MG/DL (ref 8.5–10.5)
CHLORIDE BLD-SCNC: 103 MMOL/L (ref 98–107)
CO2 SERPL-SCNC: 27 MMOL/L (ref 22–31)
CREAT SERPL-MCNC: 1.57 MG/DL (ref 0.6–1.1)
GFR SERPL CREATININE-BSD FRML MDRD: 32 ML/MIN/1.73M2
GLUCOSE BLD-MCNC: 117 MG/DL (ref 70–125)
INR PPP: 3.8 (ref 0.9–1.1)
POTASSIUM BLD-SCNC: 3.9 MMOL/L (ref 3.5–5)
SODIUM SERPL-SCNC: 139 MMOL/L (ref 136–145)
T4 FREE SERPL-MCNC: 0.9 NG/DL (ref 0.7–1.8)
TSH SERPL DL<=0.005 MIU/L-ACNC: 1.35 UIU/ML (ref 0.3–5)

## 2020-04-21 ENCOUNTER — HOSPITAL ENCOUNTER (OUTPATIENT)
Dept: PALLIATIVE MEDICINE | Facility: OTHER | Age: 80
Discharge: HOME | End: 2020-04-21
Attending: ANESTHESIOLOGY

## 2020-04-21 DIAGNOSIS — G89.4 CHRONIC PAIN SYNDROME: ICD-10-CM

## 2020-04-21 DIAGNOSIS — M54.16 LUMBAR RADICULOPATHY: ICD-10-CM

## 2020-04-21 ASSESSMENT — MIFFLIN-ST. JEOR: SCORE: 1039.94

## 2020-04-22 ENCOUNTER — OFFICE VISIT - HEALTHEAST (OUTPATIENT)
Dept: UROLOGY | Facility: CLINIC | Age: 80
End: 2020-04-22

## 2020-04-22 DIAGNOSIS — N20.0 CALCULUS OF KIDNEY: ICD-10-CM

## 2020-04-24 ENCOUNTER — COMMUNICATION - HEALTHEAST (OUTPATIENT)
Dept: PALLIATIVE MEDICINE | Facility: OTHER | Age: 80
End: 2020-04-24

## 2020-04-27 ENCOUNTER — HOSPITAL ENCOUNTER (OUTPATIENT)
Dept: CT IMAGING | Facility: CLINIC | Age: 80
Discharge: HOME | End: 2020-04-27
Attending: UROLOGY

## 2020-04-27 DIAGNOSIS — N20.0 CALCULUS OF KIDNEY: ICD-10-CM

## 2020-04-28 ENCOUNTER — COMMUNICATION - HEALTHEAST (OUTPATIENT)
Dept: ANTICOAGULATION | Facility: CLINIC | Age: 80
End: 2020-04-28

## 2020-04-28 DIAGNOSIS — I26.99 PULMONARY EMBOLUS, RIGHT (H): ICD-10-CM

## 2020-04-28 DIAGNOSIS — I27.82 OTHER CHRONIC PULMONARY EMBOLISM WITHOUT ACUTE COR PULMONALE (H): ICD-10-CM

## 2020-04-28 DIAGNOSIS — I26.99 PULMONARY EMBOLISM (H): ICD-10-CM

## 2020-04-28 DIAGNOSIS — Z86.718 HISTORY OF BLOOD CLOTS: ICD-10-CM

## 2020-04-30 ENCOUNTER — COMMUNICATION - HEALTHEAST (OUTPATIENT)
Dept: LAB | Facility: CLINIC | Age: 80
End: 2020-04-30

## 2020-05-01 ENCOUNTER — OFFICE VISIT - HEALTHEAST (OUTPATIENT)
Dept: UROLOGY | Facility: CLINIC | Age: 80
End: 2020-05-01

## 2020-05-01 ENCOUNTER — COMMUNICATION - HEALTHEAST (OUTPATIENT)
Dept: ANTICOAGULATION | Facility: CLINIC | Age: 80
End: 2020-05-01

## 2020-05-01 ENCOUNTER — COMMUNICATION - HEALTHEAST (OUTPATIENT)
Dept: PALLIATIVE MEDICINE | Facility: OTHER | Age: 80
End: 2020-05-01

## 2020-05-01 ENCOUNTER — AMBULATORY - HEALTHEAST (OUTPATIENT)
Dept: LAB | Facility: CLINIC | Age: 80
End: 2020-05-01

## 2020-05-01 DIAGNOSIS — I27.82 OTHER CHRONIC PULMONARY EMBOLISM WITHOUT ACUTE COR PULMONALE (H): ICD-10-CM

## 2020-05-01 DIAGNOSIS — I26.99 PULMONARY EMBOLISM (H): ICD-10-CM

## 2020-05-01 DIAGNOSIS — Z86.718 HISTORY OF BLOOD CLOTS: ICD-10-CM

## 2020-05-01 DIAGNOSIS — M54.16 LUMBAR RADICULOPATHY: ICD-10-CM

## 2020-05-01 DIAGNOSIS — R10.9 FLANK PAIN: ICD-10-CM

## 2020-05-01 DIAGNOSIS — I26.99 PULMONARY EMBOLUS, RIGHT (H): ICD-10-CM

## 2020-05-01 LAB
ALBUMIN UR-MCNC: ABNORMAL MG/DL
ANION GAP SERPL CALCULATED.3IONS-SCNC: 7 MMOL/L (ref 5–18)
APPEARANCE UR: CLEAR
BACTERIA #/AREA URNS HPF: ABNORMAL HPF
BILIRUB UR QL STRIP: NEGATIVE
BUN SERPL-MCNC: 22 MG/DL (ref 8–28)
CALCIUM SERPL-MCNC: 8.2 MG/DL (ref 8.5–10.5)
CAOX CRY #/AREA URNS HPF: PRESENT /[HPF]
CHLORIDE BLD-SCNC: 104 MMOL/L (ref 98–107)
CO2 SERPL-SCNC: 29 MMOL/L (ref 22–31)
COLOR UR AUTO: YELLOW
CREAT SERPL-MCNC: 1.5 MG/DL (ref 0.6–1.1)
GFR SERPL CREATININE-BSD FRML MDRD: 34 ML/MIN/1.73M2
GLUCOSE BLD-MCNC: 125 MG/DL (ref 70–125)
GLUCOSE UR STRIP-MCNC: NEGATIVE MG/DL
HGB UR QL STRIP: ABNORMAL
INR PPP: 1.6 (ref 0.9–1.1)
KETONES UR STRIP-MCNC: NEGATIVE MG/DL
LEUKOCYTE ESTERASE UR QL STRIP: NEGATIVE
NITRATE UR QL: NEGATIVE
PH UR STRIP: 6.5 [PH] (ref 4.5–8)
POTASSIUM BLD-SCNC: 3.9 MMOL/L (ref 3.5–5)
RBC #/AREA URNS AUTO: ABNORMAL HPF
SODIUM SERPL-SCNC: 140 MMOL/L (ref 136–145)
SP GR UR STRIP: 1.02 (ref 1–1.03)
SQUAMOUS #/AREA URNS AUTO: ABNORMAL LPF
TRANS CELLS #/AREA URNS HPF: ABNORMAL LPF
UROBILINOGEN UR STRIP-ACNC: ABNORMAL
WBC #/AREA URNS AUTO: ABNORMAL HPF

## 2020-05-02 LAB — BACTERIA SPEC CULT: NO GROWTH

## 2020-05-04 ENCOUNTER — AMBULATORY - HEALTHEAST (OUTPATIENT)
Dept: UROLOGY | Facility: CLINIC | Age: 80
End: 2020-05-04

## 2020-05-04 ENCOUNTER — OFFICE VISIT - HEALTHEAST (OUTPATIENT)
Dept: FAMILY MEDICINE | Facility: CLINIC | Age: 80
End: 2020-05-04

## 2020-05-04 DIAGNOSIS — Z11.59 ENCOUNTER FOR SCREENING FOR OTHER VIRAL DISEASES: ICD-10-CM

## 2020-05-04 DIAGNOSIS — E83.51 HYPOCALCEMIA: ICD-10-CM

## 2020-05-04 DIAGNOSIS — G89.4 CHRONIC PAIN SYNDROME: ICD-10-CM

## 2020-05-04 DIAGNOSIS — R10.9 FLANK PAIN: ICD-10-CM

## 2020-05-05 ENCOUNTER — COMMUNICATION - HEALTHEAST (OUTPATIENT)
Dept: ANTICOAGULATION | Facility: CLINIC | Age: 80
End: 2020-05-05

## 2020-05-05 ENCOUNTER — OFFICE VISIT - HEALTHEAST (OUTPATIENT)
Dept: FAMILY MEDICINE | Facility: CLINIC | Age: 80
End: 2020-05-05

## 2020-05-05 DIAGNOSIS — I26.99 PULMONARY EMBOLISM (H): ICD-10-CM

## 2020-05-05 DIAGNOSIS — Z86.718 HISTORY OF BLOOD CLOTS: ICD-10-CM

## 2020-05-05 DIAGNOSIS — I26.99 PULMONARY EMBOLUS, RIGHT (H): ICD-10-CM

## 2020-05-05 DIAGNOSIS — Z01.818 PRE-OP EXAM: ICD-10-CM

## 2020-05-05 DIAGNOSIS — G90.523 COMPLEX REGIONAL PAIN SYNDROME TYPE 1 OF BOTH LOWER EXTREMITIES: ICD-10-CM

## 2020-05-05 DIAGNOSIS — I27.82 OTHER CHRONIC PULMONARY EMBOLISM WITHOUT ACUTE COR PULMONALE (H): ICD-10-CM

## 2020-05-05 DIAGNOSIS — F11.20 OPIOID TYPE DEPENDENCE, CONTINUOUS (H): ICD-10-CM

## 2020-05-05 DIAGNOSIS — N18.31 CHRONIC KIDNEY DISEASE (CKD) STAGE G3A/A1, MODERATELY DECREASED GLOMERULAR FILTRATION RATE (GFR) BETWEEN 45-59 ML/MIN/1.73 SQUARE METER AND ALBUMINURIA CREATININE RATIO LESS THAN 30 MG/G (H): ICD-10-CM

## 2020-05-05 LAB
ANION GAP SERPL CALCULATED.3IONS-SCNC: 7 MMOL/L (ref 5–18)
BUN SERPL-MCNC: 16 MG/DL (ref 8–28)
CALCIUM SERPL-MCNC: 8.8 MG/DL (ref 8.5–10.5)
CHLORIDE BLD-SCNC: 104 MMOL/L (ref 98–107)
CO2 SERPL-SCNC: 27 MMOL/L (ref 22–31)
CREAT SERPL-MCNC: 1.47 MG/DL (ref 0.6–1.1)
ERYTHROCYTE [DISTWIDTH] IN BLOOD BY AUTOMATED COUNT: 13 % (ref 11–14.5)
GFR SERPL CREATININE-BSD FRML MDRD: 34 ML/MIN/1.73M2
GLUCOSE BLD-MCNC: 101 MG/DL (ref 70–125)
HCT VFR BLD AUTO: 34.9 % (ref 35–47)
HGB BLD-MCNC: 11.3 G/DL (ref 12–16)
INR PPP: 1.3 (ref 0.9–1.1)
MCH RBC QN AUTO: 32.2 PG (ref 27–34)
MCHC RBC AUTO-ENTMCNC: 32.3 G/DL (ref 32–36)
MCV RBC AUTO: 100 FL (ref 80–100)
PLATELET # BLD AUTO: 131 THOU/UL (ref 140–440)
PMV BLD AUTO: 7.8 FL (ref 7–10)
POTASSIUM BLD-SCNC: 4.2 MMOL/L (ref 3.5–5)
RBC # BLD AUTO: 3.5 MILL/UL (ref 3.8–5.4)
SODIUM SERPL-SCNC: 138 MMOL/L (ref 136–145)
WBC: 3.2 THOU/UL (ref 4–11)

## 2020-05-05 ASSESSMENT — MIFFLIN-ST. JEOR: SCORE: 1049.01

## 2020-05-10 ENCOUNTER — OFFICE VISIT - HEALTHEAST (OUTPATIENT)
Dept: FAMILY MEDICINE | Facility: CLINIC | Age: 80
End: 2020-05-10

## 2020-05-10 DIAGNOSIS — Z11.59 ENCOUNTER FOR SCREENING FOR OTHER VIRAL DISEASES: ICD-10-CM

## 2020-05-11 ENCOUNTER — AMBULATORY - HEALTHEAST (OUTPATIENT)
Dept: FAMILY MEDICINE | Facility: CLINIC | Age: 80
End: 2020-05-11

## 2020-05-11 ENCOUNTER — COMMUNICATION - HEALTHEAST (OUTPATIENT)
Dept: ANTICOAGULATION | Facility: CLINIC | Age: 80
End: 2020-05-11

## 2020-05-11 ENCOUNTER — COMMUNICATION - HEALTHEAST (OUTPATIENT)
Dept: FAMILY MEDICINE | Facility: CLINIC | Age: 80
End: 2020-05-11

## 2020-05-11 ENCOUNTER — COMMUNICATION - HEALTHEAST (OUTPATIENT)
Dept: PALLIATIVE MEDICINE | Facility: OTHER | Age: 80
End: 2020-05-11

## 2020-05-11 ENCOUNTER — AMBULATORY - HEALTHEAST (OUTPATIENT)
Dept: LAB | Facility: CLINIC | Age: 80
End: 2020-05-11

## 2020-05-11 DIAGNOSIS — I26.99 PULMONARY EMBOLISM (H): ICD-10-CM

## 2020-05-11 DIAGNOSIS — Z86.718 HISTORY OF BLOOD CLOTS: ICD-10-CM

## 2020-05-11 DIAGNOSIS — I27.82 OTHER CHRONIC PULMONARY EMBOLISM WITHOUT ACUTE COR PULMONALE (H): ICD-10-CM

## 2020-05-11 DIAGNOSIS — I26.99 PULMONARY EMBOLUS, RIGHT (H): ICD-10-CM

## 2020-05-11 DIAGNOSIS — U07.1 COVID-19: ICD-10-CM

## 2020-05-11 DIAGNOSIS — G89.4 CHRONIC PAIN SYNDROME: ICD-10-CM

## 2020-05-11 LAB — INR PPP: 1.6 (ref 0.9–1.1)

## 2020-05-13 ENCOUNTER — OFFICE VISIT - HEALTHEAST (OUTPATIENT)
Dept: UROLOGY | Facility: CLINIC | Age: 80
End: 2020-05-13

## 2020-05-13 DIAGNOSIS — Z87.442 HISTORY OF KIDNEY STONES: ICD-10-CM

## 2020-05-15 ENCOUNTER — COMMUNICATION - HEALTHEAST (OUTPATIENT)
Dept: CARDIOLOGY | Facility: CLINIC | Age: 80
End: 2020-05-15

## 2020-05-18 ENCOUNTER — COMMUNICATION - HEALTHEAST (OUTPATIENT)
Dept: LAB | Facility: CLINIC | Age: 80
End: 2020-05-18

## 2020-05-19 ENCOUNTER — AMBULATORY - HEALTHEAST (OUTPATIENT)
Dept: LAB | Facility: CLINIC | Age: 80
End: 2020-05-19

## 2020-05-19 ENCOUNTER — COMMUNICATION - HEALTHEAST (OUTPATIENT)
Dept: ANTICOAGULATION | Facility: CLINIC | Age: 80
End: 2020-05-19

## 2020-05-19 DIAGNOSIS — I27.82 OTHER CHRONIC PULMONARY EMBOLISM WITHOUT ACUTE COR PULMONALE (H): ICD-10-CM

## 2020-05-19 DIAGNOSIS — I26.99 PULMONARY EMBOLISM (H): ICD-10-CM

## 2020-05-19 DIAGNOSIS — I26.99 PULMONARY EMBOLUS, RIGHT (H): ICD-10-CM

## 2020-05-19 DIAGNOSIS — Z86.718 HISTORY OF BLOOD CLOTS: ICD-10-CM

## 2020-05-19 LAB — INR PPP: 1.4 (ref 0.9–1.1)

## 2020-05-22 ENCOUNTER — COMMUNICATION - HEALTHEAST (OUTPATIENT)
Dept: ANTICOAGULATION | Facility: CLINIC | Age: 80
End: 2020-05-22

## 2020-05-22 ENCOUNTER — AMBULATORY - HEALTHEAST (OUTPATIENT)
Dept: LAB | Facility: CLINIC | Age: 80
End: 2020-05-22

## 2020-05-22 DIAGNOSIS — Z86.718 HISTORY OF BLOOD CLOTS: ICD-10-CM

## 2020-05-22 DIAGNOSIS — I27.82 OTHER CHRONIC PULMONARY EMBOLISM WITHOUT ACUTE COR PULMONALE (H): ICD-10-CM

## 2020-05-22 DIAGNOSIS — I26.99 PULMONARY EMBOLUS, RIGHT (H): ICD-10-CM

## 2020-05-22 DIAGNOSIS — I26.99 PULMONARY EMBOLISM (H): ICD-10-CM

## 2020-05-22 LAB — INR PPP: 1.7 (ref 0.9–1.1)

## 2020-05-28 ENCOUNTER — COMMUNICATION - HEALTHEAST (OUTPATIENT)
Dept: PALLIATIVE MEDICINE | Facility: OTHER | Age: 80
End: 2020-05-28

## 2020-05-28 DIAGNOSIS — M54.16 LUMBAR RADICULOPATHY: ICD-10-CM

## 2020-05-29 ENCOUNTER — COMMUNICATION - HEALTHEAST (OUTPATIENT)
Dept: FAMILY MEDICINE | Facility: CLINIC | Age: 80
End: 2020-05-29

## 2020-05-29 DIAGNOSIS — I27.82 OTHER CHRONIC PULMONARY EMBOLISM WITHOUT ACUTE COR PULMONALE (H): ICD-10-CM

## 2020-05-29 LAB — INR PPP: 1.7 (ref 0.9–1.1)

## 2020-06-01 ENCOUNTER — OFFICE VISIT - HEALTHEAST (OUTPATIENT)
Dept: FAMILY MEDICINE | Facility: CLINIC | Age: 80
End: 2020-06-01

## 2020-06-01 DIAGNOSIS — R35.0 URINARY FREQUENCY: ICD-10-CM

## 2020-06-01 DIAGNOSIS — D64.9 ANEMIA, UNSPECIFIED TYPE: ICD-10-CM

## 2020-06-01 DIAGNOSIS — G44.029 CHRONIC CLUSTER HEADACHE, NOT INTRACTABLE: ICD-10-CM

## 2020-06-01 DIAGNOSIS — F32.1 MODERATE MAJOR DEPRESSION (H): ICD-10-CM

## 2020-06-01 DIAGNOSIS — G90.523 COMPLEX REGIONAL PAIN SYNDROME TYPE 1 OF BOTH LOWER EXTREMITIES: ICD-10-CM

## 2020-06-01 DIAGNOSIS — Z86.718 HISTORY OF BLOOD CLOTS: ICD-10-CM

## 2020-06-01 ASSESSMENT — PATIENT HEALTH QUESTIONNAIRE - PHQ9: SUM OF ALL RESPONSES TO PHQ QUESTIONS 1-9: 17

## 2020-06-02 ENCOUNTER — COMMUNICATION - HEALTHEAST (OUTPATIENT)
Dept: ADMINISTRATIVE | Facility: CLINIC | Age: 80
End: 2020-06-02

## 2020-06-03 ENCOUNTER — AMBULATORY - HEALTHEAST (OUTPATIENT)
Dept: ENDOCRINOLOGY | Facility: CLINIC | Age: 80
End: 2020-06-03

## 2020-06-03 DIAGNOSIS — M85.89 OSTEOPENIA OF MULTIPLE SITES: ICD-10-CM

## 2020-06-04 ENCOUNTER — COMMUNICATION - HEALTHEAST (OUTPATIENT)
Dept: LAB | Facility: CLINIC | Age: 80
End: 2020-06-04

## 2020-06-08 ENCOUNTER — AMBULATORY - HEALTHEAST (OUTPATIENT)
Dept: LAB | Facility: CLINIC | Age: 80
End: 2020-06-08

## 2020-06-08 DIAGNOSIS — I26.99 PULMONARY EMBOLISM (H): ICD-10-CM

## 2020-06-08 DIAGNOSIS — I26.99 PULMONARY EMBOLUS, RIGHT (H): ICD-10-CM

## 2020-06-08 DIAGNOSIS — E78.2 MIXED HYPERLIPIDEMIA: ICD-10-CM

## 2020-06-08 DIAGNOSIS — D64.9 ANEMIA, UNSPECIFIED TYPE: ICD-10-CM

## 2020-06-08 DIAGNOSIS — I27.82 OTHER CHRONIC PULMONARY EMBOLISM WITHOUT ACUTE COR PULMONALE (H): ICD-10-CM

## 2020-06-08 DIAGNOSIS — Z86.718 HISTORY OF BLOOD CLOTS: ICD-10-CM

## 2020-06-08 DIAGNOSIS — R35.0 URINARY FREQUENCY: ICD-10-CM

## 2020-06-08 LAB
ALBUMIN UR-MCNC: NEGATIVE MG/DL
ANION GAP SERPL CALCULATED.3IONS-SCNC: 10 MMOL/L (ref 5–18)
APPEARANCE UR: CLEAR
AST SERPL W P-5'-P-CCNC: 29 U/L (ref 0–40)
BILIRUB UR QL STRIP: NEGATIVE
BUN SERPL-MCNC: 26 MG/DL (ref 8–28)
CALCIUM SERPL-MCNC: 9 MG/DL (ref 8.5–10.5)
CHLORIDE BLD-SCNC: 103 MMOL/L (ref 98–107)
CHOLEST SERPL-MCNC: 163 MG/DL
CO2 SERPL-SCNC: 23 MMOL/L (ref 22–31)
COLOR UR AUTO: YELLOW
CREAT SERPL-MCNC: 1.75 MG/DL (ref 0.6–1.1)
ERYTHROCYTE [DISTWIDTH] IN BLOOD BY AUTOMATED COUNT: 11.4 % (ref 11–14.5)
FASTING STATUS PATIENT QL REPORTED: YES
GFR SERPL CREATININE-BSD FRML MDRD: 28 ML/MIN/1.73M2
GLUCOSE BLD-MCNC: 93 MG/DL (ref 70–125)
GLUCOSE UR STRIP-MCNC: NEGATIVE MG/DL
HCT VFR BLD AUTO: 38 % (ref 35–47)
HDLC SERPL-MCNC: 93 MG/DL
HGB BLD-MCNC: 12.9 G/DL (ref 12–16)
HGB UR QL STRIP: ABNORMAL
KETONES UR STRIP-MCNC: NEGATIVE MG/DL
LDLC SERPL CALC-MCNC: 59 MG/DL
LEUKOCYTE ESTERASE UR QL STRIP: NEGATIVE
MCH RBC QN AUTO: 33.4 PG (ref 27–34)
MCHC RBC AUTO-ENTMCNC: 34 G/DL (ref 32–36)
MCV RBC AUTO: 98 FL (ref 80–100)
NITRATE UR QL: NEGATIVE
PH UR STRIP: 5.5 [PH] (ref 5–8)
PLATELET # BLD AUTO: 220 THOU/UL (ref 140–440)
PMV BLD AUTO: 7 FL (ref 7–10)
POTASSIUM BLD-SCNC: 4.2 MMOL/L (ref 3.5–5)
RBC # BLD AUTO: 3.87 MILL/UL (ref 3.8–5.4)
SODIUM SERPL-SCNC: 136 MMOL/L (ref 136–145)
SP GR UR STRIP: 1.02 (ref 1–1.03)
TRIGL SERPL-MCNC: 57 MG/DL
UROBILINOGEN UR STRIP-ACNC: ABNORMAL
WBC: 4.5 THOU/UL (ref 4–11)

## 2020-06-09 ENCOUNTER — COMMUNICATION - HEALTHEAST (OUTPATIENT)
Dept: ANTICOAGULATION | Facility: CLINIC | Age: 80
End: 2020-06-09

## 2020-06-09 ENCOUNTER — COMMUNICATION - HEALTHEAST (OUTPATIENT)
Dept: CARDIOLOGY | Facility: CLINIC | Age: 80
End: 2020-06-09

## 2020-06-09 DIAGNOSIS — I27.82 OTHER CHRONIC PULMONARY EMBOLISM WITHOUT ACUTE COR PULMONALE (H): ICD-10-CM

## 2020-06-09 LAB
BACTERIA SPEC CULT: NO GROWTH
INR PPP: 2 (ref 0.9–1.1)

## 2020-06-10 ENCOUNTER — OFFICE VISIT - HEALTHEAST (OUTPATIENT)
Dept: UROLOGY | Facility: CLINIC | Age: 80
End: 2020-06-10

## 2020-06-10 DIAGNOSIS — N20.1 CALCULUS OF URETER: ICD-10-CM

## 2020-06-10 DIAGNOSIS — N20.0 CALCULUS OF KIDNEY: ICD-10-CM

## 2020-06-12 ENCOUNTER — OFFICE VISIT - HEALTHEAST (OUTPATIENT)
Dept: FAMILY MEDICINE | Facility: CLINIC | Age: 80
End: 2020-06-12

## 2020-06-12 DIAGNOSIS — G44.029 CHRONIC CLUSTER HEADACHE, NOT INTRACTABLE: ICD-10-CM

## 2020-06-12 DIAGNOSIS — R35.0 URINARY FREQUENCY: ICD-10-CM

## 2020-06-12 DIAGNOSIS — N18.31 CHRONIC KIDNEY DISEASE (CKD) STAGE G3A/A1, MODERATELY DECREASED GLOMERULAR FILTRATION RATE (GFR) BETWEEN 45-59 ML/MIN/1.73 SQUARE METER AND ALBUMINURIA CREATININE RATIO LESS THAN 30 MG/G (H): ICD-10-CM

## 2020-06-12 DIAGNOSIS — G90.523 COMPLEX REGIONAL PAIN SYNDROME TYPE 1 OF BOTH LOWER EXTREMITIES: ICD-10-CM

## 2020-06-12 DIAGNOSIS — E03.9 ACQUIRED HYPOTHYROIDISM: ICD-10-CM

## 2020-06-15 ENCOUNTER — HOSPITAL ENCOUNTER (OUTPATIENT)
Dept: PALLIATIVE MEDICINE | Facility: OTHER | Age: 80
Discharge: HOME | End: 2020-06-15
Attending: ANESTHESIOLOGY

## 2020-06-15 DIAGNOSIS — G89.4 CHRONIC PAIN SYNDROME: ICD-10-CM

## 2020-06-15 DIAGNOSIS — G90.523 COMPLEX REGIONAL PAIN SYNDROME TYPE 1 OF BOTH LOWER EXTREMITIES: ICD-10-CM

## 2020-06-15 DIAGNOSIS — M54.16 LUMBAR RADICULOPATHY: ICD-10-CM

## 2020-06-15 ASSESSMENT — MIFFLIN-ST. JEOR: SCORE: 1021.79

## 2020-06-16 ENCOUNTER — COMMUNICATION - HEALTHEAST (OUTPATIENT)
Dept: PALLIATIVE MEDICINE | Facility: OTHER | Age: 80
End: 2020-06-16

## 2020-06-16 ENCOUNTER — COMMUNICATION - HEALTHEAST (OUTPATIENT)
Dept: CARDIOLOGY | Facility: CLINIC | Age: 80
End: 2020-06-16

## 2020-06-17 ENCOUNTER — COMMUNICATION - HEALTHEAST (OUTPATIENT)
Dept: CARDIOLOGY | Facility: CLINIC | Age: 80
End: 2020-06-17

## 2020-06-17 DIAGNOSIS — E78.2 MIXED HYPERLIPIDEMIA: ICD-10-CM

## 2020-06-24 ENCOUNTER — COMMUNICATION - HEALTHEAST (OUTPATIENT)
Dept: FAMILY MEDICINE | Facility: CLINIC | Age: 80
End: 2020-06-24

## 2020-06-24 DIAGNOSIS — I27.82 OTHER CHRONIC PULMONARY EMBOLISM WITHOUT ACUTE COR PULMONALE (H): ICD-10-CM

## 2020-06-24 LAB — INR PPP: 1.5 (ref 0.9–1.1)

## 2020-06-25 ENCOUNTER — COMMUNICATION - HEALTHEAST (OUTPATIENT)
Dept: CARDIOLOGY | Facility: CLINIC | Age: 80
End: 2020-06-25

## 2020-06-25 ENCOUNTER — AMBULATORY - HEALTHEAST (OUTPATIENT)
Dept: LAB | Facility: CLINIC | Age: 80
End: 2020-06-25

## 2020-06-25 ENCOUNTER — COMMUNICATION - HEALTHEAST (OUTPATIENT)
Dept: FAMILY MEDICINE | Facility: CLINIC | Age: 80
End: 2020-06-25

## 2020-06-25 DIAGNOSIS — N18.31 CHRONIC KIDNEY DISEASE (CKD) STAGE G3A/A1, MODERATELY DECREASED GLOMERULAR FILTRATION RATE (GFR) BETWEEN 45-59 ML/MIN/1.73 SQUARE METER AND ALBUMINURIA CREATININE RATIO LESS THAN 30 MG/G (H): ICD-10-CM

## 2020-06-25 DIAGNOSIS — R35.0 URINARY FREQUENCY: ICD-10-CM

## 2020-06-25 DIAGNOSIS — E03.9 ACQUIRED HYPOTHYROIDISM: ICD-10-CM

## 2020-06-25 DIAGNOSIS — B02.8 HERPES ZOSTER WITH COMPLICATION: ICD-10-CM

## 2020-06-25 LAB
ALBUMIN UR-MCNC: NEGATIVE MG/DL
ANION GAP SERPL CALCULATED.3IONS-SCNC: 11 MMOL/L (ref 5–18)
APPEARANCE UR: CLEAR
BILIRUB UR QL STRIP: NEGATIVE
BUN SERPL-MCNC: 28 MG/DL (ref 8–28)
CALCIUM SERPL-MCNC: 9.5 MG/DL (ref 8.5–10.5)
CHLORIDE BLD-SCNC: 103 MMOL/L (ref 98–107)
CO2 SERPL-SCNC: 22 MMOL/L (ref 22–31)
COLOR UR AUTO: YELLOW
CREAT SERPL-MCNC: 1.35 MG/DL (ref 0.6–1.1)
GFR SERPL CREATININE-BSD FRML MDRD: 38 ML/MIN/1.73M2
GLUCOSE BLD-MCNC: 111 MG/DL (ref 70–125)
GLUCOSE UR STRIP-MCNC: NEGATIVE MG/DL
HGB UR QL STRIP: ABNORMAL
KETONES UR STRIP-MCNC: NEGATIVE MG/DL
LEUKOCYTE ESTERASE UR QL STRIP: NEGATIVE
NITRATE UR QL: NEGATIVE
PH UR STRIP: 7 [PH] (ref 5–8)
POTASSIUM BLD-SCNC: 4.1 MMOL/L (ref 3.5–5)
SODIUM SERPL-SCNC: 136 MMOL/L (ref 136–145)
SP GR UR STRIP: 1.02 (ref 1–1.03)
T4 FREE SERPL-MCNC: 1 NG/DL (ref 0.7–1.8)
TSH SERPL DL<=0.005 MIU/L-ACNC: 0.61 UIU/ML (ref 0.3–5)
UROBILINOGEN UR STRIP-ACNC: ABNORMAL

## 2020-06-26 LAB — BACTERIA SPEC CULT: NO GROWTH

## 2020-06-30 ENCOUNTER — OFFICE VISIT - HEALTHEAST (OUTPATIENT)
Dept: FAMILY MEDICINE | Facility: CLINIC | Age: 80
End: 2020-06-30

## 2020-06-30 DIAGNOSIS — M79.10 MYALGIA: ICD-10-CM

## 2020-06-30 DIAGNOSIS — N18.31 CHRONIC KIDNEY DISEASE (CKD) STAGE G3A/A1, MODERATELY DECREASED GLOMERULAR FILTRATION RATE (GFR) BETWEEN 45-59 ML/MIN/1.73 SQUARE METER AND ALBUMINURIA CREATININE RATIO LESS THAN 30 MG/G (H): ICD-10-CM

## 2020-06-30 DIAGNOSIS — B02.8 HERPES ZOSTER WITH COMPLICATION: ICD-10-CM

## 2020-06-30 DIAGNOSIS — R31.9 HEMATURIA, UNSPECIFIED TYPE: ICD-10-CM

## 2020-07-02 ENCOUNTER — COMMUNICATION - HEALTHEAST (OUTPATIENT)
Dept: FAMILY MEDICINE | Facility: CLINIC | Age: 80
End: 2020-07-02

## 2020-07-10 ENCOUNTER — COMMUNICATION - HEALTHEAST (OUTPATIENT)
Dept: ANTICOAGULATION | Facility: CLINIC | Age: 80
End: 2020-07-10

## 2020-07-13 ENCOUNTER — COMMUNICATION - HEALTHEAST (OUTPATIENT)
Dept: ANTICOAGULATION | Facility: CLINIC | Age: 80
End: 2020-07-13

## 2020-07-13 ENCOUNTER — AMBULATORY - HEALTHEAST (OUTPATIENT)
Dept: LAB | Facility: CLINIC | Age: 80
End: 2020-07-13

## 2020-07-13 DIAGNOSIS — I26.99 PULMONARY EMBOLISM (H): ICD-10-CM

## 2020-07-13 DIAGNOSIS — I27.82 OTHER CHRONIC PULMONARY EMBOLISM WITHOUT ACUTE COR PULMONALE (H): ICD-10-CM

## 2020-07-13 DIAGNOSIS — I26.99 PULMONARY EMBOLUS, RIGHT (H): ICD-10-CM

## 2020-07-13 DIAGNOSIS — Z86.718 HISTORY OF BLOOD CLOTS: ICD-10-CM

## 2020-07-13 LAB — INR PPP: 1.7 (ref 0.9–1.1)

## 2020-07-15 ENCOUNTER — RECORDS - HEALTHEAST (OUTPATIENT)
Dept: ADMINISTRATIVE | Facility: OTHER | Age: 80
End: 2020-07-15

## 2020-07-22 ENCOUNTER — OFFICE VISIT - HEALTHEAST (OUTPATIENT)
Dept: FAMILY MEDICINE | Facility: CLINIC | Age: 80
End: 2020-07-22

## 2020-07-22 ENCOUNTER — COMMUNICATION - HEALTHEAST (OUTPATIENT)
Dept: SCHEDULING | Facility: CLINIC | Age: 80
End: 2020-07-22

## 2020-07-22 DIAGNOSIS — G90.523 COMPLEX REGIONAL PAIN SYNDROME TYPE 1 OF BOTH LOWER EXTREMITIES: ICD-10-CM

## 2020-07-22 DIAGNOSIS — R00.2 PALPITATIONS: ICD-10-CM

## 2020-07-22 LAB
ALBUMIN UR-MCNC: ABNORMAL MG/DL
AMORPH CRY #/AREA URNS HPF: ABNORMAL /[HPF]
ANION GAP SERPL CALCULATED.3IONS-SCNC: 9 MMOL/L (ref 5–18)
APPEARANCE UR: CLEAR
ATRIAL RATE - MUSE: 93 BPM
BACTERIA #/AREA URNS HPF: ABNORMAL HPF
BASOPHILS # BLD AUTO: 0 THOU/UL (ref 0–0.2)
BASOPHILS NFR BLD AUTO: 1 % (ref 0–2)
BILIRUB UR QL STRIP: ABNORMAL
BUN SERPL-MCNC: 24 MG/DL (ref 8–28)
CALCIUM SERPL-MCNC: 9.1 MG/DL (ref 8.5–10.5)
CHLORIDE BLD-SCNC: 100 MMOL/L (ref 98–107)
CO2 SERPL-SCNC: 27 MMOL/L (ref 22–31)
COLOR UR AUTO: YELLOW
CREAT SERPL-MCNC: 1.3 MG/DL (ref 0.6–1.1)
DIASTOLIC BLOOD PRESSURE - MUSE: NORMAL
EOSINOPHIL # BLD AUTO: 0.1 THOU/UL (ref 0–0.4)
EOSINOPHIL NFR BLD AUTO: 2 % (ref 0–6)
ERYTHROCYTE [DISTWIDTH] IN BLOOD BY AUTOMATED COUNT: 12.4 % (ref 11–14.5)
GFR SERPL CREATININE-BSD FRML MDRD: 40 ML/MIN/1.73M2
GLUCOSE BLD-MCNC: 97 MG/DL (ref 70–125)
GLUCOSE UR STRIP-MCNC: NEGATIVE MG/DL
HCT VFR BLD AUTO: 38.8 % (ref 35–47)
HGB BLD-MCNC: 12.9 G/DL (ref 12–16)
HGB UR QL STRIP: ABNORMAL
INTERPRETATION ECG - MUSE: NORMAL
KETONES UR STRIP-MCNC: NEGATIVE MG/DL
LEUKOCYTE ESTERASE UR QL STRIP: NEGATIVE
LYMPHOCYTES # BLD AUTO: 1.5 THOU/UL (ref 0.8–4.4)
LYMPHOCYTES NFR BLD AUTO: 27 % (ref 20–40)
MCH RBC QN AUTO: 32.7 PG (ref 27–34)
MCHC RBC AUTO-ENTMCNC: 33.1 G/DL (ref 32–36)
MCV RBC AUTO: 99 FL (ref 80–100)
MONOCYTES # BLD AUTO: 0.4 THOU/UL (ref 0–0.9)
MONOCYTES NFR BLD AUTO: 7 % (ref 2–10)
NEUTROPHILS # BLD AUTO: 3.5 THOU/UL (ref 2–7.7)
NEUTROPHILS NFR BLD AUTO: 63 % (ref 50–70)
NITRATE UR QL: NEGATIVE
P AXIS - MUSE: 54 DEGREES
PH UR STRIP: 5.5 [PH] (ref 5–8)
PLATELET # BLD AUTO: 147 THOU/UL (ref 140–440)
PMV BLD AUTO: 7.7 FL (ref 7–10)
POTASSIUM BLD-SCNC: ABNORMAL MMOL/L
PR INTERVAL - MUSE: 122 MS
QRS DURATION - MUSE: 84 MS
QT - MUSE: 348 MS
QTC - MUSE: 432 MS
R AXIS - MUSE: 37 DEGREES
RBC # BLD AUTO: 3.93 MILL/UL (ref 3.8–5.4)
RBC #/AREA URNS AUTO: ABNORMAL HPF
SODIUM SERPL-SCNC: 136 MMOL/L (ref 136–145)
SP GR UR STRIP: 1.02 (ref 1–1.03)
SQUAMOUS #/AREA URNS AUTO: ABNORMAL LPF
SYSTOLIC BLOOD PRESSURE - MUSE: NORMAL
T AXIS - MUSE: 78 DEGREES
UROBILINOGEN UR STRIP-ACNC: ABNORMAL
VENTRICULAR RATE- MUSE: 93 BPM
WBC #/AREA URNS AUTO: ABNORMAL HPF
WBC: 5.5 THOU/UL (ref 4–11)

## 2020-07-23 ENCOUNTER — OFFICE VISIT - HEALTHEAST (OUTPATIENT)
Dept: UROLOGY | Facility: CLINIC | Age: 80
End: 2020-07-23

## 2020-07-23 DIAGNOSIS — R34 LOW URINE OUTPUT: ICD-10-CM

## 2020-07-23 DIAGNOSIS — R82.991 HYPOCITRATURIA: ICD-10-CM

## 2020-07-27 ENCOUNTER — COMMUNICATION - HEALTHEAST (OUTPATIENT)
Dept: ANTICOAGULATION | Facility: CLINIC | Age: 80
End: 2020-07-27

## 2020-07-27 ENCOUNTER — AMBULATORY - HEALTHEAST (OUTPATIENT)
Dept: LAB | Facility: CLINIC | Age: 80
End: 2020-07-27

## 2020-07-27 DIAGNOSIS — I27.82 OTHER CHRONIC PULMONARY EMBOLISM WITHOUT ACUTE COR PULMONALE (H): ICD-10-CM

## 2020-07-27 DIAGNOSIS — B02.8 HERPES ZOSTER WITH COMPLICATION: ICD-10-CM

## 2020-07-27 DIAGNOSIS — N18.31 CHRONIC KIDNEY DISEASE (CKD) STAGE G3A/A1, MODERATELY DECREASED GLOMERULAR FILTRATION RATE (GFR) BETWEEN 45-59 ML/MIN/1.73 SQUARE METER AND ALBUMINURIA CREATININE RATIO LESS THAN 30 MG/G (H): ICD-10-CM

## 2020-07-27 DIAGNOSIS — Z86.718 HISTORY OF BLOOD CLOTS: ICD-10-CM

## 2020-07-27 DIAGNOSIS — I26.99 PULMONARY EMBOLISM (H): ICD-10-CM

## 2020-07-27 DIAGNOSIS — I26.99 PULMONARY EMBOLUS, RIGHT (H): ICD-10-CM

## 2020-07-27 DIAGNOSIS — M79.10 MYALGIA: ICD-10-CM

## 2020-07-27 LAB
ALBUMIN SERPL-MCNC: 3.7 G/DL (ref 3.5–5)
ALP SERPL-CCNC: 48 U/L (ref 45–120)
ALT SERPL W P-5'-P-CCNC: 15 U/L (ref 0–45)
ANION GAP SERPL CALCULATED.3IONS-SCNC: 9 MMOL/L (ref 5–18)
AST SERPL W P-5'-P-CCNC: 23 U/L (ref 0–40)
BILIRUB SERPL-MCNC: 0.4 MG/DL (ref 0–1)
BUN SERPL-MCNC: 15 MG/DL (ref 8–28)
CALCIUM SERPL-MCNC: 9 MG/DL (ref 8.5–10.5)
CHLORIDE BLD-SCNC: 98 MMOL/L (ref 98–107)
CK SERPL-CCNC: 89 U/L (ref 30–190)
CO2 SERPL-SCNC: 29 MMOL/L (ref 22–31)
CREAT SERPL-MCNC: 1.31 MG/DL (ref 0.6–1.1)
ERYTHROCYTE [DISTWIDTH] IN BLOOD BY AUTOMATED COUNT: 12.6 % (ref 11–14.5)
GFR SERPL CREATININE-BSD FRML MDRD: 39 ML/MIN/1.73M2
GLUCOSE BLD-MCNC: 156 MG/DL (ref 70–125)
HCT VFR BLD AUTO: 34.2 % (ref 35–47)
HGB BLD-MCNC: 11.7 G/DL (ref 12–16)
INR PPP: 3.9 (ref 0.9–1.1)
MCH RBC QN AUTO: 32.6 PG (ref 27–34)
MCHC RBC AUTO-ENTMCNC: 34.1 G/DL (ref 32–36)
MCV RBC AUTO: 96 FL (ref 80–100)
PLATELET # BLD AUTO: 148 THOU/UL (ref 140–440)
PMV BLD AUTO: 7.2 FL (ref 7–10)
POTASSIUM BLD-SCNC: 4.3 MMOL/L (ref 3.5–5)
PROT SERPL-MCNC: 6.3 G/DL (ref 6–8)
RBC # BLD AUTO: 3.58 MILL/UL (ref 3.8–5.4)
SODIUM SERPL-SCNC: 136 MMOL/L (ref 136–145)
WBC: 5.5 THOU/UL (ref 4–11)

## 2020-07-28 ENCOUNTER — COMMUNICATION - HEALTHEAST (OUTPATIENT)
Dept: ANTICOAGULATION | Facility: CLINIC | Age: 80
End: 2020-07-28

## 2020-07-28 ENCOUNTER — RECORDS - HEALTHEAST (OUTPATIENT)
Dept: ADMINISTRATIVE | Facility: OTHER | Age: 80
End: 2020-07-28

## 2020-07-28 DIAGNOSIS — I27.82 OTHER CHRONIC PULMONARY EMBOLISM WITHOUT ACUTE COR PULMONALE (H): ICD-10-CM

## 2020-07-29 ENCOUNTER — COMMUNICATION - HEALTHEAST (OUTPATIENT)
Dept: PALLIATIVE MEDICINE | Facility: OTHER | Age: 80
End: 2020-07-29

## 2020-07-29 DIAGNOSIS — M54.16 LUMBAR RADICULOPATHY: ICD-10-CM

## 2020-08-03 ENCOUNTER — COMMUNICATION - HEALTHEAST (OUTPATIENT)
Dept: PALLIATIVE MEDICINE | Facility: OTHER | Age: 80
End: 2020-08-03

## 2020-08-04 ENCOUNTER — COMMUNICATION - HEALTHEAST (OUTPATIENT)
Dept: ADMINISTRATIVE | Facility: CLINIC | Age: 80
End: 2020-08-04

## 2020-08-04 ENCOUNTER — HOSPITAL ENCOUNTER (OUTPATIENT)
Dept: PALLIATIVE MEDICINE | Facility: OTHER | Age: 80
Discharge: HOME | End: 2020-08-04
Attending: ANESTHESIOLOGY | Admitting: PAIN MEDICINE

## 2020-08-04 DIAGNOSIS — M19.90 OSTEOARTHRITIS: ICD-10-CM

## 2020-08-04 DIAGNOSIS — G89.4 CHRONIC PAIN SYNDROME: ICD-10-CM

## 2020-08-04 ASSESSMENT — MIFFLIN-ST. JEOR: SCORE: 1059.21

## 2020-08-05 ENCOUNTER — COMMUNICATION - HEALTHEAST (OUTPATIENT)
Dept: ANTICOAGULATION | Facility: CLINIC | Age: 80
End: 2020-08-05

## 2020-08-05 ENCOUNTER — OFFICE VISIT - HEALTHEAST (OUTPATIENT)
Dept: FAMILY MEDICINE | Facility: CLINIC | Age: 80
End: 2020-08-05

## 2020-08-05 ENCOUNTER — COMMUNICATION - HEALTHEAST (OUTPATIENT)
Dept: PALLIATIVE MEDICINE | Facility: OTHER | Age: 80
End: 2020-08-05

## 2020-08-05 DIAGNOSIS — G90.523 COMPLEX REGIONAL PAIN SYNDROME TYPE 1 OF BOTH LOWER EXTREMITIES: ICD-10-CM

## 2020-08-05 DIAGNOSIS — F43.21 GRIEVING: ICD-10-CM

## 2020-08-05 DIAGNOSIS — Z86.718 HISTORY OF BLOOD CLOTS: ICD-10-CM

## 2020-08-05 DIAGNOSIS — I27.82 OTHER CHRONIC PULMONARY EMBOLISM WITHOUT ACUTE COR PULMONALE (H): ICD-10-CM

## 2020-08-05 LAB — INR PPP: 4.8 (ref 0.9–1.1)

## 2020-08-06 ENCOUNTER — COMMUNICATION - HEALTHEAST (OUTPATIENT)
Dept: FAMILY MEDICINE | Facility: CLINIC | Age: 80
End: 2020-08-06

## 2020-08-10 ENCOUNTER — HOSPITAL ENCOUNTER (OUTPATIENT)
Dept: PALLIATIVE MEDICINE | Facility: OTHER | Age: 80
Discharge: HOME | End: 2020-08-10
Attending: ANESTHESIOLOGY

## 2020-08-10 DIAGNOSIS — G89.4 CHRONIC PAIN SYNDROME: ICD-10-CM

## 2020-08-10 DIAGNOSIS — M54.16 LUMBAR RADICULOPATHY: ICD-10-CM

## 2020-08-10 ASSESSMENT — MIFFLIN-ST. JEOR: SCORE: 1059.21

## 2020-08-12 ENCOUNTER — COMMUNICATION - HEALTHEAST (OUTPATIENT)
Dept: ANTICOAGULATION | Facility: CLINIC | Age: 80
End: 2020-08-12

## 2020-08-12 ENCOUNTER — AMBULATORY - HEALTHEAST (OUTPATIENT)
Dept: LAB | Facility: CLINIC | Age: 80
End: 2020-08-12

## 2020-08-12 ENCOUNTER — COMMUNICATION - HEALTHEAST (OUTPATIENT)
Dept: LAB | Facility: CLINIC | Age: 80
End: 2020-08-12

## 2020-08-12 ENCOUNTER — HOSPITAL ENCOUNTER (OUTPATIENT)
Dept: ADMINISTRATIVE | Facility: OTHER | Age: 80
Discharge: HOME | End: 2020-08-12

## 2020-08-12 DIAGNOSIS — I27.82 OTHER CHRONIC PULMONARY EMBOLISM WITHOUT ACUTE COR PULMONALE (H): ICD-10-CM

## 2020-08-12 DIAGNOSIS — M85.80 OSTEOPENIA: ICD-10-CM

## 2020-08-12 DIAGNOSIS — N20.0 CALCULUS OF KIDNEY: ICD-10-CM

## 2020-08-12 DIAGNOSIS — Z86.718 HISTORY OF BLOOD CLOTS: ICD-10-CM

## 2020-08-12 LAB
ANION GAP SERPL CALCULATED.3IONS-SCNC: 8 MMOL/L (ref 5–18)
BUN SERPL-MCNC: 21 MG/DL (ref 8–28)
CALCIUM SERPL-MCNC: 8.6 MG/DL (ref 8.5–10.5)
CALCIUM, IONIZED MEASURED: 1.11 MMOL/L (ref 1.11–1.3)
CHLORIDE BLD-SCNC: 101 MMOL/L (ref 98–107)
CO2 SERPL-SCNC: 30 MMOL/L (ref 22–31)
CREAT SERPL-MCNC: 1.46 MG/DL (ref 0.6–1.1)
GFR SERPL CREATININE-BSD FRML MDRD: 35 ML/MIN/1.73M2
GLUCOSE BLD-MCNC: 98 MG/DL (ref 70–125)
INR PPP: 1.6 (ref 0.9–1.1)
ION CA PH 7.4: 1.12 MMOL/L (ref 1.11–1.3)
PH: 7.41 (ref 7.35–7.45)
POTASSIUM BLD-SCNC: 4.4 MMOL/L (ref 3.5–5)
PTH-INTACT SERPL-MCNC: 92 PG/ML (ref 10–86)
SODIUM SERPL-SCNC: 139 MMOL/L (ref 136–145)

## 2020-08-13 LAB
25(OH)D3 SERPL-MCNC: 49.5 NG/ML (ref 30–80)
25(OH)D3 SERPL-MCNC: 49.5 NG/ML (ref 30–80)

## 2020-08-18 ENCOUNTER — COMMUNICATION - HEALTHEAST (OUTPATIENT)
Dept: FAMILY MEDICINE | Facility: CLINIC | Age: 80
End: 2020-08-18

## 2020-08-24 ENCOUNTER — COMMUNICATION - HEALTHEAST (OUTPATIENT)
Dept: FAMILY MEDICINE | Facility: CLINIC | Age: 80
End: 2020-08-24

## 2020-08-24 ENCOUNTER — OFFICE VISIT - HEALTHEAST (OUTPATIENT)
Dept: FAMILY MEDICINE | Facility: CLINIC | Age: 80
End: 2020-08-24

## 2020-08-24 DIAGNOSIS — G90.523 COMPLEX REGIONAL PAIN SYNDROME TYPE 1 OF BOTH LOWER EXTREMITIES: ICD-10-CM

## 2020-08-24 DIAGNOSIS — F43.21 GRIEVING: ICD-10-CM

## 2020-08-24 DIAGNOSIS — R41.3 MEMORY LOSS: ICD-10-CM

## 2020-08-25 ENCOUNTER — COMMUNICATION - HEALTHEAST (OUTPATIENT)
Dept: SCHEDULING | Facility: CLINIC | Age: 80
End: 2020-08-25

## 2020-08-26 ENCOUNTER — AMBULATORY - HEALTHEAST (OUTPATIENT)
Dept: LAB | Facility: CLINIC | Age: 80
End: 2020-08-26

## 2020-08-26 ENCOUNTER — COMMUNICATION - HEALTHEAST (OUTPATIENT)
Dept: ANTICOAGULATION | Facility: CLINIC | Age: 80
End: 2020-08-26

## 2020-08-26 DIAGNOSIS — Z86.718 HISTORY OF BLOOD CLOTS: ICD-10-CM

## 2020-08-26 DIAGNOSIS — I27.82 OTHER CHRONIC PULMONARY EMBOLISM WITHOUT ACUTE COR PULMONALE (H): ICD-10-CM

## 2020-08-26 DIAGNOSIS — D73.5 SPLENIC INFARCTION: ICD-10-CM

## 2020-08-26 LAB — INR PPP: 1.8 (ref 0.9–1.1)

## 2020-09-02 ENCOUNTER — COMMUNICATION - HEALTHEAST (OUTPATIENT)
Dept: ANTICOAGULATION | Facility: CLINIC | Age: 80
End: 2020-09-02

## 2020-09-02 ENCOUNTER — AMBULATORY - HEALTHEAST (OUTPATIENT)
Dept: LAB | Facility: CLINIC | Age: 80
End: 2020-09-02

## 2020-09-02 DIAGNOSIS — Z86.718 HISTORY OF BLOOD CLOTS: ICD-10-CM

## 2020-09-02 DIAGNOSIS — I27.82 OTHER CHRONIC PULMONARY EMBOLISM WITHOUT ACUTE COR PULMONALE (H): ICD-10-CM

## 2020-09-02 LAB — INR PPP: 3.8 (ref 0.9–1.1)

## 2020-09-10 ENCOUNTER — COMMUNICATION - HEALTHEAST (OUTPATIENT)
Dept: BEHAVIORAL HEALTH | Facility: CLINIC | Age: 80
End: 2020-09-10

## 2020-09-11 ENCOUNTER — COMMUNICATION - HEALTHEAST (OUTPATIENT)
Dept: BEHAVIORAL HEALTH | Facility: CLINIC | Age: 80
End: 2020-09-11

## 2020-09-16 ENCOUNTER — OFFICE VISIT - HEALTHEAST (OUTPATIENT)
Dept: FAMILY MEDICINE | Facility: CLINIC | Age: 80
End: 2020-09-16

## 2020-09-16 ENCOUNTER — COMMUNICATION - HEALTHEAST (OUTPATIENT)
Dept: ANTICOAGULATION | Facility: CLINIC | Age: 80
End: 2020-09-16

## 2020-09-16 DIAGNOSIS — G90.523 COMPLEX REGIONAL PAIN SYNDROME TYPE 1 OF BOTH LOWER EXTREMITIES: ICD-10-CM

## 2020-09-16 DIAGNOSIS — Z23 IMMUNIZATION DUE: ICD-10-CM

## 2020-09-16 DIAGNOSIS — G89.4 CHRONIC PAIN SYNDROME: ICD-10-CM

## 2020-09-16 DIAGNOSIS — R41.3 MEMORY LOSS: ICD-10-CM

## 2020-09-16 DIAGNOSIS — I27.82 OTHER CHRONIC PULMONARY EMBOLISM WITHOUT ACUTE COR PULMONALE (H): ICD-10-CM

## 2020-09-16 DIAGNOSIS — R21 RASH: ICD-10-CM

## 2020-09-16 DIAGNOSIS — D73.5 SPLENIC INFARCTION: ICD-10-CM

## 2020-09-16 DIAGNOSIS — Z86.718 HISTORY OF BLOOD CLOTS: ICD-10-CM

## 2020-09-16 LAB
ALBUMIN SERPL-MCNC: 3.9 G/DL (ref 3.5–5)
ALP SERPL-CCNC: 48 U/L (ref 45–120)
ALT SERPL W P-5'-P-CCNC: 23 U/L (ref 0–45)
ANION GAP SERPL CALCULATED.3IONS-SCNC: 9 MMOL/L (ref 5–18)
AST SERPL W P-5'-P-CCNC: 30 U/L (ref 0–40)
BILIRUB SERPL-MCNC: 0.7 MG/DL (ref 0–1)
BUN SERPL-MCNC: 21 MG/DL (ref 8–28)
CALCIUM SERPL-MCNC: 8.9 MG/DL (ref 8.5–10.5)
CHLORIDE BLD-SCNC: 102 MMOL/L (ref 98–107)
CO2 SERPL-SCNC: 28 MMOL/L (ref 22–31)
CREAT SERPL-MCNC: 1.33 MG/DL (ref 0.6–1.1)
ERYTHROCYTE [DISTWIDTH] IN BLOOD BY AUTOMATED COUNT: 11.8 % (ref 11–14.5)
GFR SERPL CREATININE-BSD FRML MDRD: 39 ML/MIN/1.73M2
GLUCOSE BLD-MCNC: 73 MG/DL (ref 70–125)
HCT VFR BLD AUTO: 33.2 % (ref 35–47)
HGB BLD-MCNC: 11 G/DL (ref 12–16)
INR PPP: 1.1 (ref 0.9–1.1)
MCH RBC QN AUTO: 32 PG (ref 27–34)
MCHC RBC AUTO-ENTMCNC: 33.1 G/DL (ref 32–36)
MCV RBC AUTO: 97 FL (ref 80–100)
PLATELET # BLD AUTO: 169 THOU/UL (ref 140–440)
PMV BLD AUTO: 7.3 FL (ref 7–10)
POTASSIUM BLD-SCNC: 4.2 MMOL/L (ref 3.5–5)
PROT SERPL-MCNC: 6.2 G/DL (ref 6–8)
RBC # BLD AUTO: 3.43 MILL/UL (ref 3.8–5.4)
SODIUM SERPL-SCNC: 139 MMOL/L (ref 136–145)
WBC: 4.2 THOU/UL (ref 4–11)

## 2020-09-21 ENCOUNTER — COMMUNICATION - HEALTHEAST (OUTPATIENT)
Dept: ANTICOAGULATION | Facility: CLINIC | Age: 80
End: 2020-09-21

## 2020-09-21 ENCOUNTER — AMBULATORY - HEALTHEAST (OUTPATIENT)
Dept: LAB | Facility: CLINIC | Age: 80
End: 2020-09-21

## 2020-09-21 DIAGNOSIS — I27.82 OTHER CHRONIC PULMONARY EMBOLISM WITHOUT ACUTE COR PULMONALE (H): ICD-10-CM

## 2020-09-21 DIAGNOSIS — Z86.718 HISTORY OF BLOOD CLOTS: ICD-10-CM

## 2020-09-24 ENCOUNTER — COMMUNICATION - HEALTHEAST (OUTPATIENT)
Dept: FAMILY MEDICINE | Facility: CLINIC | Age: 80
End: 2020-09-24

## 2020-09-28 ENCOUNTER — COMMUNICATION - HEALTHEAST (OUTPATIENT)
Dept: ANTICOAGULATION | Facility: CLINIC | Age: 80
End: 2020-09-28

## 2020-09-28 ENCOUNTER — COMMUNICATION - HEALTHEAST (OUTPATIENT)
Dept: CARDIOLOGY | Facility: CLINIC | Age: 80
End: 2020-09-28

## 2020-09-28 ENCOUNTER — COMMUNICATION - HEALTHEAST (OUTPATIENT)
Dept: PALLIATIVE MEDICINE | Facility: OTHER | Age: 80
End: 2020-09-28

## 2020-09-28 ENCOUNTER — AMBULATORY - HEALTHEAST (OUTPATIENT)
Dept: LAB | Facility: CLINIC | Age: 80
End: 2020-09-28

## 2020-09-28 DIAGNOSIS — M54.16 LUMBAR RADICULOPATHY: ICD-10-CM

## 2020-09-28 DIAGNOSIS — G89.4 CHRONIC PAIN SYNDROME: ICD-10-CM

## 2020-09-28 DIAGNOSIS — I27.82 OTHER CHRONIC PULMONARY EMBOLISM WITHOUT ACUTE COR PULMONALE (H): ICD-10-CM

## 2020-09-28 DIAGNOSIS — Z86.718 HISTORY OF BLOOD CLOTS: ICD-10-CM

## 2020-09-28 LAB — INR PPP: 1.2 (ref 0.9–1.1)

## 2020-10-02 ENCOUNTER — COMMUNICATION - HEALTHEAST (OUTPATIENT)
Dept: ANTICOAGULATION | Facility: CLINIC | Age: 80
End: 2020-10-02

## 2020-10-02 ENCOUNTER — AMBULATORY - HEALTHEAST (OUTPATIENT)
Dept: LAB | Facility: CLINIC | Age: 80
End: 2020-10-02

## 2020-10-02 DIAGNOSIS — I27.82 OTHER CHRONIC PULMONARY EMBOLISM WITHOUT ACUTE COR PULMONALE (H): ICD-10-CM

## 2020-10-02 DIAGNOSIS — Z86.718 HISTORY OF BLOOD CLOTS: ICD-10-CM

## 2020-10-02 LAB — INR PPP: 1.5 (ref 0.9–1.1)

## 2020-10-05 LAB — INR PPP: 2 (ref 0.9–1.1)

## 2020-10-06 ENCOUNTER — COMMUNICATION - HEALTHEAST (OUTPATIENT)
Dept: LAB | Facility: CLINIC | Age: 80
End: 2020-10-06

## 2020-10-06 ENCOUNTER — HOSPITAL ENCOUNTER (OUTPATIENT)
Dept: PALLIATIVE MEDICINE | Facility: OTHER | Age: 80
Discharge: HOME | End: 2020-10-06
Attending: ANESTHESIOLOGY

## 2020-10-06 DIAGNOSIS — N18.31 CHRONIC KIDNEY DISEASE (CKD) STAGE G3A/A1, MODERATELY DECREASED GLOMERULAR FILTRATION RATE (GFR) BETWEEN 45-59 ML/MIN/1.73 SQUARE METER AND ALBUMINURIA CREATININE RATIO LESS THAN 30 MG/G (H): ICD-10-CM

## 2020-10-06 DIAGNOSIS — M48.07 STENOSIS OF LATERAL RECESS OF LUMBOSACRAL SPINE: ICD-10-CM

## 2020-10-06 DIAGNOSIS — G89.4 CHRONIC PAIN SYNDROME: ICD-10-CM

## 2020-10-06 DIAGNOSIS — E78.2 MIXED HYPERLIPIDEMIA: ICD-10-CM

## 2020-10-06 DIAGNOSIS — M54.16 LUMBAR RADICULOPATHY: ICD-10-CM

## 2020-10-07 ENCOUNTER — COMMUNICATION - HEALTHEAST (OUTPATIENT)
Dept: ANTICOAGULATION | Facility: CLINIC | Age: 80
End: 2020-10-07

## 2020-10-07 ENCOUNTER — AMBULATORY - HEALTHEAST (OUTPATIENT)
Dept: LAB | Facility: CLINIC | Age: 80
End: 2020-10-07

## 2020-10-07 DIAGNOSIS — N18.31 CHRONIC KIDNEY DISEASE (CKD) STAGE G3A/A1, MODERATELY DECREASED GLOMERULAR FILTRATION RATE (GFR) BETWEEN 45-59 ML/MIN/1.73 SQUARE METER AND ALBUMINURIA CREATININE RATIO LESS THAN 30 MG/G (H): ICD-10-CM

## 2020-10-07 DIAGNOSIS — Z86.718 HISTORY OF BLOOD CLOTS: ICD-10-CM

## 2020-10-07 DIAGNOSIS — E78.2 MIXED HYPERLIPIDEMIA: ICD-10-CM

## 2020-10-07 DIAGNOSIS — I27.82 OTHER CHRONIC PULMONARY EMBOLISM WITHOUT ACUTE COR PULMONALE (H): ICD-10-CM

## 2020-10-07 LAB
ALBUMIN SERPL-MCNC: 3.8 G/DL (ref 3.5–5)
ALP SERPL-CCNC: 47 U/L (ref 45–120)
ALT SERPL W P-5'-P-CCNC: 22 U/L (ref 0–45)
ANION GAP SERPL CALCULATED.3IONS-SCNC: 7 MMOL/L (ref 5–18)
AST SERPL W P-5'-P-CCNC: 34 U/L (ref 0–40)
BILIRUB SERPL-MCNC: 0.4 MG/DL (ref 0–1)
BUN SERPL-MCNC: 28 MG/DL (ref 8–28)
CALCIUM SERPL-MCNC: 8.3 MG/DL (ref 8.5–10.5)
CHLORIDE BLD-SCNC: 102 MMOL/L (ref 98–107)
CHOLEST SERPL-MCNC: 143 MG/DL
CO2 SERPL-SCNC: 28 MMOL/L (ref 22–31)
CREAT SERPL-MCNC: 1.57 MG/DL (ref 0.6–1.1)
FASTING STATUS PATIENT QL REPORTED: YES
GFR SERPL CREATININE-BSD FRML MDRD: 32 ML/MIN/1.73M2
GLUCOSE BLD-MCNC: 85 MG/DL (ref 70–125)
HDLC SERPL-MCNC: 89 MG/DL
INR PPP: 1.8 (ref 0.9–1.1)
LDLC SERPL CALC-MCNC: 45 MG/DL
POTASSIUM BLD-SCNC: 4.1 MMOL/L (ref 3.5–5)
PROT SERPL-MCNC: 6.1 G/DL (ref 6–8)
SODIUM SERPL-SCNC: 137 MMOL/L (ref 136–145)
TRIGL SERPL-MCNC: 47 MG/DL

## 2020-10-08 ENCOUNTER — COMMUNICATION - HEALTHEAST (OUTPATIENT)
Dept: FAMILY MEDICINE | Facility: CLINIC | Age: 80
End: 2020-10-08

## 2020-10-08 DIAGNOSIS — N18.31 CHRONIC KIDNEY DISEASE (CKD) STAGE G3A/A1, MODERATELY DECREASED GLOMERULAR FILTRATION RATE (GFR) BETWEEN 45-59 ML/MIN/1.73 SQUARE METER AND ALBUMINURIA CREATININE RATIO LESS THAN 30 MG/G (H): ICD-10-CM

## 2020-10-12 ENCOUNTER — OFFICE VISIT - HEALTHEAST (OUTPATIENT)
Dept: FAMILY MEDICINE | Facility: CLINIC | Age: 80
End: 2020-10-12

## 2020-10-12 DIAGNOSIS — D73.5 SPLENIC INFARCTION: ICD-10-CM

## 2020-10-12 DIAGNOSIS — N18.31 CHRONIC KIDNEY DISEASE (CKD) STAGE G3A/A1, MODERATELY DECREASED GLOMERULAR FILTRATION RATE (GFR) BETWEEN 45-59 ML/MIN/1.73 SQUARE METER AND ALBUMINURIA CREATININE RATIO LESS THAN 30 MG/G (H): ICD-10-CM

## 2020-10-12 DIAGNOSIS — I27.82 OTHER CHRONIC PULMONARY EMBOLISM WITHOUT ACUTE COR PULMONALE (H): ICD-10-CM

## 2020-10-12 DIAGNOSIS — G47.00 INSOMNIA, UNSPECIFIED TYPE: ICD-10-CM

## 2020-10-12 DIAGNOSIS — Z86.718 HISTORY OF BLOOD CLOTS: ICD-10-CM

## 2020-10-19 ENCOUNTER — COMMUNICATION - HEALTHEAST (OUTPATIENT)
Dept: UROLOGY | Facility: CLINIC | Age: 80
End: 2020-10-19

## 2020-10-20 ENCOUNTER — HOSPITAL ENCOUNTER (OUTPATIENT)
Dept: CT IMAGING | Facility: CLINIC | Age: 80
Discharge: HOME | End: 2020-10-20

## 2020-10-20 ENCOUNTER — COMMUNICATION - HEALTHEAST (OUTPATIENT)
Dept: LAB | Facility: CLINIC | Age: 80
End: 2020-10-20

## 2020-10-20 DIAGNOSIS — N18.31 CHRONIC KIDNEY DISEASE (CKD) STAGE G3A/A1, MODERATELY DECREASED GLOMERULAR FILTRATION RATE (GFR) BETWEEN 45-59 ML/MIN/1.73 SQUARE METER AND ALBUMINURIA CREATININE RATIO LESS THAN 30 MG/G (H): ICD-10-CM

## 2020-10-21 ENCOUNTER — COMMUNICATION - HEALTHEAST (OUTPATIENT)
Dept: UROLOGY | Facility: CLINIC | Age: 80
End: 2020-10-21

## 2020-10-26 ENCOUNTER — RECORDS - HEALTHEAST (OUTPATIENT)
Dept: ADMINISTRATIVE | Facility: OTHER | Age: 80
End: 2020-10-26

## 2020-10-27 ENCOUNTER — AMBULATORY - HEALTHEAST (OUTPATIENT)
Dept: LAB | Facility: CLINIC | Age: 80
End: 2020-10-27

## 2020-10-27 ENCOUNTER — COMMUNICATION - HEALTHEAST (OUTPATIENT)
Dept: ANTICOAGULATION | Facility: CLINIC | Age: 80
End: 2020-10-27

## 2020-10-27 DIAGNOSIS — I27.82 OTHER CHRONIC PULMONARY EMBOLISM WITHOUT ACUTE COR PULMONALE (H): ICD-10-CM

## 2020-10-27 DIAGNOSIS — Z86.718 HISTORY OF BLOOD CLOTS: ICD-10-CM

## 2020-10-27 LAB — INR PPP: 1.5 (ref 0.9–1.1)

## 2020-11-04 ENCOUNTER — RECORDS - HEALTHEAST (OUTPATIENT)
Dept: ADMINISTRATIVE | Facility: OTHER | Age: 80
End: 2020-11-04

## 2020-11-04 ENCOUNTER — COMMUNICATION - HEALTHEAST (OUTPATIENT)
Dept: PALLIATIVE MEDICINE | Facility: OTHER | Age: 80
End: 2020-11-04

## 2020-11-05 ENCOUNTER — COMMUNICATION - HEALTHEAST (OUTPATIENT)
Dept: PALLIATIVE MEDICINE | Facility: OTHER | Age: 80
End: 2020-11-05

## 2020-11-05 DIAGNOSIS — G89.4 CHRONIC PAIN SYNDROME: ICD-10-CM

## 2020-11-05 DIAGNOSIS — M54.16 LUMBAR RADICULOPATHY: ICD-10-CM

## 2020-11-06 ENCOUNTER — AMBULATORY - HEALTHEAST (OUTPATIENT)
Dept: LAB | Facility: CLINIC | Age: 80
End: 2020-11-06

## 2020-11-06 DIAGNOSIS — N18.31 CHRONIC KIDNEY DISEASE (CKD) STAGE G3A/A1, MODERATELY DECREASED GLOMERULAR FILTRATION RATE (GFR) BETWEEN 45-59 ML/MIN/1.73 SQUARE METER AND ALBUMINURIA CREATININE RATIO LESS THAN 30 MG/G (H): ICD-10-CM

## 2020-11-11 ENCOUNTER — OFFICE VISIT - HEALTHEAST (OUTPATIENT)
Dept: CARDIOLOGY | Facility: CLINIC | Age: 80
End: 2020-11-11

## 2020-11-11 DIAGNOSIS — N18.30 CHRONIC KIDNEY DISEASE, STAGE III (MODERATE) (H): ICD-10-CM

## 2020-11-11 DIAGNOSIS — I25.10 CORONARY ARTERY DISEASE INVOLVING NATIVE CORONARY ARTERY OF NATIVE HEART WITHOUT ANGINA PECTORIS: ICD-10-CM

## 2020-11-11 DIAGNOSIS — I65.23 BILATERAL CAROTID ARTERY STENOSIS: ICD-10-CM

## 2020-11-11 DIAGNOSIS — E78.2 MIXED HYPERLIPIDEMIA: ICD-10-CM

## 2020-11-11 ASSESSMENT — MIFFLIN-ST. JEOR: SCORE: 1077.36

## 2020-11-17 ENCOUNTER — COMMUNICATION - HEALTHEAST (OUTPATIENT)
Dept: ANTICOAGULATION | Facility: CLINIC | Age: 80
End: 2020-11-17

## 2020-11-17 ENCOUNTER — OFFICE VISIT - HEALTHEAST (OUTPATIENT)
Dept: FAMILY MEDICINE | Facility: CLINIC | Age: 80
End: 2020-11-17

## 2020-11-17 DIAGNOSIS — I27.82 OTHER CHRONIC PULMONARY EMBOLISM WITHOUT ACUTE COR PULMONALE (H): ICD-10-CM

## 2020-11-17 DIAGNOSIS — R51.9 OCCIPITAL HEADACHE: ICD-10-CM

## 2020-11-17 DIAGNOSIS — M48.07 STENOSIS OF LATERAL RECESS OF LUMBOSACRAL SPINE: ICD-10-CM

## 2020-11-17 DIAGNOSIS — N18.31 CHRONIC KIDNEY DISEASE (CKD) STAGE G3A/A1, MODERATELY DECREASED GLOMERULAR FILTRATION RATE (GFR) BETWEEN 45-59 ML/MIN/1.73 SQUARE METER AND ALBUMINURIA CREATININE RATIO LESS THAN 30 MG/G (H): ICD-10-CM

## 2020-11-17 DIAGNOSIS — I10 ESSENTIAL HYPERTENSION: ICD-10-CM

## 2020-11-17 DIAGNOSIS — Z86.718 HISTORY OF BLOOD CLOTS: ICD-10-CM

## 2020-11-17 LAB
ANION GAP SERPL CALCULATED.3IONS-SCNC: 13 MMOL/L (ref 5–18)
BUN SERPL-MCNC: 25 MG/DL (ref 8–28)
CALCIUM SERPL-MCNC: 9.1 MG/DL (ref 8.5–10.5)
CHLORIDE BLD-SCNC: 107 MMOL/L (ref 98–107)
CO2 SERPL-SCNC: 24 MMOL/L (ref 22–31)
CREAT SERPL-MCNC: 1.49 MG/DL (ref 0.6–1.1)
GFR SERPL CREATININE-BSD FRML MDRD: 34 ML/MIN/1.73M2
GLUCOSE BLD-MCNC: 90 MG/DL (ref 70–125)
INR PPP: 1.9 (ref 0.9–1.1)
POTASSIUM BLD-SCNC: 4.2 MMOL/L (ref 3.5–5)
SODIUM SERPL-SCNC: 144 MMOL/L (ref 136–145)

## 2020-11-17 ASSESSMENT — MIFFLIN-ST. JEOR: SCORE: 1069.7

## 2020-11-18 ENCOUNTER — COMMUNICATION - HEALTHEAST (OUTPATIENT)
Dept: FAMILY MEDICINE | Facility: CLINIC | Age: 80
End: 2020-11-18

## 2020-11-19 ENCOUNTER — OFFICE VISIT - HEALTHEAST (OUTPATIENT)
Dept: UROLOGY | Facility: CLINIC | Age: 80
End: 2020-11-19

## 2020-11-19 DIAGNOSIS — R30.0 DYSURIA: ICD-10-CM

## 2020-11-19 DIAGNOSIS — R34 LOW URINE OUTPUT: ICD-10-CM

## 2020-11-19 DIAGNOSIS — R35.0 URINARY FREQUENCY: ICD-10-CM

## 2020-11-19 DIAGNOSIS — R82.991 HYPOCITRATURIA: ICD-10-CM

## 2020-11-24 ENCOUNTER — COMMUNICATION - HEALTHEAST (OUTPATIENT)
Dept: ANTICOAGULATION | Facility: CLINIC | Age: 80
End: 2020-11-24

## 2020-11-24 ENCOUNTER — AMBULATORY - HEALTHEAST (OUTPATIENT)
Dept: LAB | Facility: CLINIC | Age: 80
End: 2020-11-24

## 2020-11-24 DIAGNOSIS — Z86.718 HISTORY OF BLOOD CLOTS: ICD-10-CM

## 2020-11-24 DIAGNOSIS — I27.82 OTHER CHRONIC PULMONARY EMBOLISM WITHOUT ACUTE COR PULMONALE (H): ICD-10-CM

## 2020-11-24 DIAGNOSIS — R35.0 URINARY FREQUENCY: ICD-10-CM

## 2020-11-24 DIAGNOSIS — D73.5 SPLENIC INFARCTION: ICD-10-CM

## 2020-11-24 DIAGNOSIS — R30.0 DYSURIA: ICD-10-CM

## 2020-11-24 LAB — INR PPP: 1.6 (ref 0.9–1.1)

## 2020-11-25 ENCOUNTER — COMMUNICATION - HEALTHEAST (OUTPATIENT)
Dept: FAMILY MEDICINE | Facility: CLINIC | Age: 80
End: 2020-11-25

## 2020-11-25 LAB — BACTERIA SPEC CULT: NO GROWTH

## 2020-12-02 ENCOUNTER — COMMUNICATION - HEALTHEAST (OUTPATIENT)
Dept: FAMILY MEDICINE | Facility: CLINIC | Age: 80
End: 2020-12-02

## 2020-12-02 ENCOUNTER — COMMUNICATION - HEALTHEAST (OUTPATIENT)
Dept: SCHEDULING | Facility: CLINIC | Age: 80
End: 2020-12-02

## 2020-12-02 ENCOUNTER — COMMUNICATION - HEALTHEAST (OUTPATIENT)
Dept: ANTICOAGULATION | Facility: CLINIC | Age: 80
End: 2020-12-02

## 2020-12-02 ENCOUNTER — AMBULATORY - HEALTHEAST (OUTPATIENT)
Dept: LAB | Facility: CLINIC | Age: 80
End: 2020-12-02

## 2020-12-02 DIAGNOSIS — Z86.718 HISTORY OF BLOOD CLOTS: ICD-10-CM

## 2020-12-02 DIAGNOSIS — I27.82 OTHER CHRONIC PULMONARY EMBOLISM WITHOUT ACUTE COR PULMONALE (H): ICD-10-CM

## 2020-12-02 LAB — INR PPP: 4.5 (ref 0.9–1.1)

## 2020-12-03 ENCOUNTER — COMMUNICATION - HEALTHEAST (OUTPATIENT)
Dept: PALLIATIVE MEDICINE | Facility: OTHER | Age: 80
End: 2020-12-03

## 2020-12-03 ENCOUNTER — HOSPITAL ENCOUNTER (OUTPATIENT)
Dept: PALLIATIVE MEDICINE | Facility: OTHER | Age: 80
Discharge: HOME | End: 2020-12-03
Attending: ANESTHESIOLOGY

## 2020-12-03 DIAGNOSIS — G89.4 CHRONIC PAIN SYNDROME: ICD-10-CM

## 2020-12-03 DIAGNOSIS — M54.16 LUMBAR RADICULOPATHY: ICD-10-CM

## 2020-12-03 DIAGNOSIS — M25.562 KNEE PAIN, BILATERAL: ICD-10-CM

## 2020-12-03 DIAGNOSIS — M48.07 STENOSIS OF LATERAL RECESS OF LUMBOSACRAL SPINE: ICD-10-CM

## 2020-12-03 DIAGNOSIS — M17.0 PRIMARY OSTEOARTHRITIS OF BOTH KNEES: ICD-10-CM

## 2020-12-03 DIAGNOSIS — G90.523 COMPLEX REGIONAL PAIN SYNDROME TYPE 1 OF BOTH LOWER EXTREMITIES: ICD-10-CM

## 2020-12-03 DIAGNOSIS — M25.561 KNEE PAIN, BILATERAL: ICD-10-CM

## 2020-12-03 ASSESSMENT — MIFFLIN-ST. JEOR: SCORE: 1093.24

## 2020-12-04 ENCOUNTER — AMBULATORY - HEALTHEAST (OUTPATIENT)
Dept: LAB | Facility: CLINIC | Age: 80
End: 2020-12-04

## 2020-12-04 ENCOUNTER — COMMUNICATION - HEALTHEAST (OUTPATIENT)
Dept: ANTICOAGULATION | Facility: CLINIC | Age: 80
End: 2020-12-04

## 2020-12-04 DIAGNOSIS — I27.82 OTHER CHRONIC PULMONARY EMBOLISM WITHOUT ACUTE COR PULMONALE (H): ICD-10-CM

## 2020-12-04 DIAGNOSIS — Z86.718 HISTORY OF BLOOD CLOTS: ICD-10-CM

## 2020-12-04 DIAGNOSIS — N18.31 CHRONIC KIDNEY DISEASE (CKD) STAGE G3A/A1, MODERATELY DECREASED GLOMERULAR FILTRATION RATE (GFR) BETWEEN 45-59 ML/MIN/1.73 SQUARE METER AND ALBUMINURIA CREATININE RATIO LESS THAN 30 MG/G (H): ICD-10-CM

## 2020-12-04 LAB
ANION GAP SERPL CALCULATED.3IONS-SCNC: 11 MMOL/L (ref 5–18)
BUN SERPL-MCNC: 19 MG/DL (ref 8–28)
CALCIUM SERPL-MCNC: 9 MG/DL (ref 8.5–10.5)
CHLORIDE BLD-SCNC: 102 MMOL/L (ref 98–107)
CO2 SERPL-SCNC: 25 MMOL/L (ref 22–31)
CREAT SERPL-MCNC: 1.47 MG/DL (ref 0.6–1.1)
GFR SERPL CREATININE-BSD FRML MDRD: 34 ML/MIN/1.73M2
GLUCOSE BLD-MCNC: 83 MG/DL (ref 70–125)
INR PPP: 1.9 (ref 0.9–1.1)
POTASSIUM BLD-SCNC: 4.3 MMOL/L (ref 3.5–5)
SODIUM SERPL-SCNC: 138 MMOL/L (ref 136–145)

## 2020-12-06 ENCOUNTER — COMMUNICATION - HEALTHEAST (OUTPATIENT)
Dept: FAMILY MEDICINE | Facility: CLINIC | Age: 80
End: 2020-12-06

## 2020-12-10 ENCOUNTER — COMMUNICATION - HEALTHEAST (OUTPATIENT)
Dept: LAB | Facility: CLINIC | Age: 80
End: 2020-12-10

## 2020-12-11 ENCOUNTER — AMBULATORY - HEALTHEAST (OUTPATIENT)
Dept: LAB | Facility: CLINIC | Age: 80
End: 2020-12-11

## 2020-12-11 ENCOUNTER — COMMUNICATION - HEALTHEAST (OUTPATIENT)
Dept: FAMILY MEDICINE | Facility: CLINIC | Age: 80
End: 2020-12-11

## 2020-12-11 ENCOUNTER — AMBULATORY - HEALTHEAST (OUTPATIENT)
Dept: SCHEDULING | Facility: CLINIC | Age: 80
End: 2020-12-11

## 2020-12-11 ENCOUNTER — COMMUNICATION - HEALTHEAST (OUTPATIENT)
Dept: ANTICOAGULATION | Facility: CLINIC | Age: 80
End: 2020-12-11

## 2020-12-11 ENCOUNTER — OFFICE VISIT - HEALTHEAST (OUTPATIENT)
Dept: ENDOCRINOLOGY | Facility: CLINIC | Age: 80
End: 2020-12-11

## 2020-12-11 DIAGNOSIS — Z78.0 POST-MENOPAUSAL: ICD-10-CM

## 2020-12-11 DIAGNOSIS — I27.82 OTHER CHRONIC PULMONARY EMBOLISM WITHOUT ACUTE COR PULMONALE (H): ICD-10-CM

## 2020-12-11 DIAGNOSIS — Z86.718 HISTORY OF BLOOD CLOTS: ICD-10-CM

## 2020-12-11 DIAGNOSIS — M85.89 OSTEOPENIA OF MULTIPLE SITES: ICD-10-CM

## 2020-12-11 LAB — INR PPP: 1.7 (ref 0.9–1.1)

## 2020-12-15 ENCOUNTER — AMBULATORY - HEALTHEAST (OUTPATIENT)
Dept: SCHEDULING | Facility: CLINIC | Age: 80
End: 2020-12-15

## 2020-12-15 DIAGNOSIS — Z78.0 POST-MENOPAUSAL: ICD-10-CM

## 2020-12-18 ENCOUNTER — COMMUNICATION - HEALTHEAST (OUTPATIENT)
Dept: ANTICOAGULATION | Facility: CLINIC | Age: 80
End: 2020-12-18

## 2020-12-18 ENCOUNTER — OFFICE VISIT - HEALTHEAST (OUTPATIENT)
Dept: FAMILY MEDICINE | Facility: CLINIC | Age: 80
End: 2020-12-18

## 2020-12-18 DIAGNOSIS — B02.8 HERPES ZOSTER WITH COMPLICATION: ICD-10-CM

## 2020-12-18 DIAGNOSIS — Z86.16 HISTORY OF 2019 NOVEL CORONAVIRUS DISEASE (COVID-19): ICD-10-CM

## 2020-12-18 DIAGNOSIS — E03.9 ACQUIRED HYPOTHYROIDISM: ICD-10-CM

## 2020-12-18 DIAGNOSIS — I27.82 OTHER CHRONIC PULMONARY EMBOLISM WITHOUT ACUTE COR PULMONALE (H): ICD-10-CM

## 2020-12-18 DIAGNOSIS — R51.9 OCCIPITAL HEADACHE: ICD-10-CM

## 2020-12-18 DIAGNOSIS — R20.2 TINGLING IN EXTREMITIES: ICD-10-CM

## 2020-12-18 DIAGNOSIS — Z86.718 HISTORY OF BLOOD CLOTS: ICD-10-CM

## 2020-12-18 DIAGNOSIS — R41.3 MEMORY LOSS: ICD-10-CM

## 2020-12-18 DIAGNOSIS — R11.0 NAUSEA: ICD-10-CM

## 2020-12-18 LAB
ALBUMIN SERPL-MCNC: 3.9 G/DL (ref 3.5–5)
ALP SERPL-CCNC: 45 U/L (ref 45–120)
ALT SERPL W P-5'-P-CCNC: 18 U/L (ref 0–45)
ANION GAP SERPL CALCULATED.3IONS-SCNC: 10 MMOL/L (ref 5–18)
AST SERPL W P-5'-P-CCNC: 41 U/L (ref 0–40)
BILIRUB SERPL-MCNC: 0.4 MG/DL (ref 0–1)
BUN SERPL-MCNC: 21 MG/DL (ref 8–28)
CALCIUM SERPL-MCNC: 8.4 MG/DL (ref 8.5–10.5)
CHLORIDE BLD-SCNC: 100 MMOL/L (ref 98–107)
CO2 SERPL-SCNC: 25 MMOL/L (ref 22–31)
CREAT SERPL-MCNC: 1.41 MG/DL (ref 0.6–1.1)
ERYTHROCYTE [DISTWIDTH] IN BLOOD BY AUTOMATED COUNT: 11.7 % (ref 11–14.5)
GFR SERPL CREATININE-BSD FRML MDRD: 36 ML/MIN/1.73M2
GLUCOSE BLD-MCNC: 82 MG/DL (ref 70–125)
HCT VFR BLD AUTO: 36 % (ref 35–47)
HGB BLD-MCNC: 12.1 G/DL (ref 12–16)
INR PPP: 3.4 (ref 0.9–1.1)
MCH RBC QN AUTO: 31.6 PG (ref 27–34)
MCHC RBC AUTO-ENTMCNC: 33.5 G/DL (ref 32–36)
MCV RBC AUTO: 94 FL (ref 80–100)
PLATELET # BLD AUTO: 116 THOU/UL (ref 140–440)
PMV BLD AUTO: 7.3 FL (ref 7–10)
POTASSIUM BLD-SCNC: 4.2 MMOL/L (ref 3.5–5)
PROT SERPL-MCNC: 6.3 G/DL (ref 6–8)
RBC # BLD AUTO: 3.82 MILL/UL (ref 3.8–5.4)
SODIUM SERPL-SCNC: 135 MMOL/L (ref 136–145)
T4 FREE SERPL-MCNC: 1.2 NG/DL (ref 0.7–1.8)
TSH SERPL DL<=0.005 MIU/L-ACNC: 0.58 UIU/ML (ref 0.3–5)
VIT B12 SERPL-MCNC: 825 PG/ML (ref 213–816)
WBC: 2.5 THOU/UL (ref 4–11)

## 2020-12-18 ASSESSMENT — MIFFLIN-ST. JEOR: SCORE: 1081.61

## 2020-12-19 ENCOUNTER — TRANSFERRED RECORDS (OUTPATIENT)
Dept: HEALTH INFORMATION MANAGEMENT | Facility: CLINIC | Age: 80
End: 2020-12-19
Payer: COMMERCIAL

## 2020-12-22 ENCOUNTER — HOSPITAL ENCOUNTER (OUTPATIENT)
Dept: PALLIATIVE MEDICINE | Facility: OTHER | Age: 80
Discharge: HOME | End: 2020-12-22
Attending: PAIN MEDICINE | Admitting: PAIN MEDICINE

## 2020-12-22 DIAGNOSIS — M25.562 KNEE PAIN, BILATERAL: ICD-10-CM

## 2020-12-22 DIAGNOSIS — M25.561 KNEE PAIN, BILATERAL: ICD-10-CM

## 2020-12-29 ENCOUNTER — COMMUNICATION - HEALTHEAST (OUTPATIENT)
Dept: PALLIATIVE MEDICINE | Facility: OTHER | Age: 80
End: 2020-12-29

## 2020-12-29 DIAGNOSIS — G90.523 COMPLEX REGIONAL PAIN SYNDROME TYPE 1 OF BOTH LOWER EXTREMITIES: ICD-10-CM

## 2020-12-31 ENCOUNTER — COMMUNICATION - HEALTHEAST (OUTPATIENT)
Dept: ANTICOAGULATION | Facility: CLINIC | Age: 80
End: 2020-12-31

## 2020-12-31 ENCOUNTER — AMBULATORY - HEALTHEAST (OUTPATIENT)
Dept: LAB | Facility: CLINIC | Age: 80
End: 2020-12-31

## 2020-12-31 DIAGNOSIS — I27.82 OTHER CHRONIC PULMONARY EMBOLISM WITHOUT ACUTE COR PULMONALE (H): ICD-10-CM

## 2020-12-31 DIAGNOSIS — Z86.718 HISTORY OF BLOOD CLOTS: ICD-10-CM

## 2020-12-31 LAB — INR PPP: 4.2 (ref 0.9–1.1)

## 2021-01-06 ENCOUNTER — COMMUNICATION - HEALTHEAST (OUTPATIENT)
Dept: PALLIATIVE MEDICINE | Facility: OTHER | Age: 81
End: 2021-01-06

## 2021-01-06 DIAGNOSIS — M25.562 PAIN IN BOTH KNEES, UNSPECIFIED CHRONICITY: ICD-10-CM

## 2021-01-06 DIAGNOSIS — M25.561 PAIN IN BOTH KNEES, UNSPECIFIED CHRONICITY: ICD-10-CM

## 2021-01-07 ENCOUNTER — COMMUNICATION - HEALTHEAST (OUTPATIENT)
Dept: FAMILY MEDICINE | Facility: CLINIC | Age: 81
End: 2021-01-07

## 2021-01-07 ENCOUNTER — COMMUNICATION - HEALTHEAST (OUTPATIENT)
Dept: ANTICOAGULATION | Facility: CLINIC | Age: 81
End: 2021-01-07

## 2021-01-07 ENCOUNTER — AMBULATORY - HEALTHEAST (OUTPATIENT)
Dept: LAB | Facility: CLINIC | Age: 81
End: 2021-01-07

## 2021-01-07 DIAGNOSIS — I27.82 OTHER CHRONIC PULMONARY EMBOLISM WITHOUT ACUTE COR PULMONALE (H): ICD-10-CM

## 2021-01-07 DIAGNOSIS — Z86.718 HISTORY OF BLOOD CLOTS: ICD-10-CM

## 2021-01-07 LAB — INR PPP: 1.1 (ref 0.9–1.1)

## 2021-01-08 ENCOUNTER — OFFICE VISIT - HEALTHEAST (OUTPATIENT)
Dept: FAMILY MEDICINE | Facility: CLINIC | Age: 81
End: 2021-01-08

## 2021-01-08 DIAGNOSIS — I27.82 OTHER CHRONIC PULMONARY EMBOLISM WITHOUT ACUTE COR PULMONALE (H): ICD-10-CM

## 2021-01-08 DIAGNOSIS — U07.1 INFECTION DUE TO 2019 NOVEL CORONAVIRUS: ICD-10-CM

## 2021-01-08 DIAGNOSIS — R51.9 OCCIPITAL HEADACHE: ICD-10-CM

## 2021-01-08 ASSESSMENT — PATIENT HEALTH QUESTIONNAIRE - PHQ9: SUM OF ALL RESPONSES TO PHQ QUESTIONS 1-9: 7

## 2021-01-08 ASSESSMENT — ANXIETY QUESTIONNAIRES
1. FEELING NERVOUS, ANXIOUS, OR ON EDGE: NOT AT ALL
6. BECOMING EASILY ANNOYED OR IRRITABLE: NOT AT ALL
3. WORRYING TOO MUCH ABOUT DIFFERENT THINGS: NOT AT ALL
2. NOT BEING ABLE TO STOP OR CONTROL WORRYING: NOT AT ALL
7. FEELING AFRAID AS IF SOMETHING AWFUL MIGHT HAPPEN: NOT AT ALL
5. BEING SO RESTLESS THAT IT IS HARD TO SIT STILL: NOT AT ALL
IF YOU CHECKED OFF ANY PROBLEMS ON THIS QUESTIONNAIRE, HOW DIFFICULT HAVE THESE PROBLEMS MADE IT FOR YOU TO DO YOUR WORK, TAKE CARE OF THINGS AT HOME, OR GET ALONG WITH OTHER PEOPLE: NOT DIFFICULT AT ALL
4. TROUBLE RELAXING: SEVERAL DAYS
GAD7 TOTAL SCORE: 1

## 2021-01-11 ENCOUNTER — COMMUNICATION - HEALTHEAST (OUTPATIENT)
Dept: SCHEDULING | Facility: CLINIC | Age: 81
End: 2021-01-11

## 2021-01-12 ENCOUNTER — RECORDS - HEALTHEAST (OUTPATIENT)
Dept: ANTICOAGULATION | Facility: CLINIC | Age: 81
End: 2021-01-12

## 2021-01-12 ENCOUNTER — COMMUNICATION - HEALTHEAST (OUTPATIENT)
Dept: ANTICOAGULATION | Facility: CLINIC | Age: 81
End: 2021-01-12

## 2021-01-12 ENCOUNTER — AMBULATORY - HEALTHEAST (OUTPATIENT)
Dept: LAB | Facility: CLINIC | Age: 81
End: 2021-01-12

## 2021-01-12 DIAGNOSIS — I27.82 OTHER CHRONIC PULMONARY EMBOLISM WITHOUT ACUTE COR PULMONALE (H): ICD-10-CM

## 2021-01-12 DIAGNOSIS — Z86.718 HISTORY OF BLOOD CLOTS: ICD-10-CM

## 2021-01-12 LAB — INR PPP: 1 (ref 0.9–1.1)

## 2021-01-15 ENCOUNTER — TRANSCRIBE ORDERS (OUTPATIENT)
Dept: OTHER | Age: 81
End: 2021-01-15

## 2021-01-15 ENCOUNTER — AMBULATORY - HEALTHEAST (OUTPATIENT)
Dept: FAMILY MEDICINE | Facility: CLINIC | Age: 81
End: 2021-01-15

## 2021-01-15 DIAGNOSIS — Z86.16 HISTORY OF COVID-19: Primary | ICD-10-CM

## 2021-01-15 DIAGNOSIS — Z86.718 HISTORY OF BLOOD CLOTS: ICD-10-CM

## 2021-01-16 ENCOUNTER — COMMUNICATION - HEALTHEAST (OUTPATIENT)
Dept: SCHEDULING | Facility: CLINIC | Age: 81
End: 2021-01-16

## 2021-01-18 ENCOUNTER — COMMUNICATION - HEALTHEAST (OUTPATIENT)
Dept: FAMILY MEDICINE | Facility: CLINIC | Age: 81
End: 2021-01-18

## 2021-01-18 DIAGNOSIS — I27.82 OTHER CHRONIC PULMONARY EMBOLISM WITHOUT ACUTE COR PULMONALE (H): ICD-10-CM

## 2021-01-19 ENCOUNTER — COMMUNICATION - HEALTHEAST (OUTPATIENT)
Dept: ANTICOAGULATION | Facility: CLINIC | Age: 81
End: 2021-01-19

## 2021-01-19 ENCOUNTER — AMBULATORY - HEALTHEAST (OUTPATIENT)
Dept: LAB | Facility: CLINIC | Age: 81
End: 2021-01-19

## 2021-01-19 DIAGNOSIS — I27.82 OTHER CHRONIC PULMONARY EMBOLISM WITHOUT ACUTE COR PULMONALE (H): ICD-10-CM

## 2021-01-19 DIAGNOSIS — Z86.718 HISTORY OF BLOOD CLOTS: ICD-10-CM

## 2021-01-19 LAB — INR PPP: 2.1 (ref 0.9–1.1)

## 2021-01-25 ENCOUNTER — AMBULATORY - HEALTHEAST (OUTPATIENT)
Dept: LAB | Facility: CLINIC | Age: 81
End: 2021-01-25

## 2021-01-25 ENCOUNTER — COMMUNICATION - HEALTHEAST (OUTPATIENT)
Dept: ANTICOAGULATION | Facility: CLINIC | Age: 81
End: 2021-01-25

## 2021-01-25 DIAGNOSIS — Z86.718 HISTORY OF BLOOD CLOTS: ICD-10-CM

## 2021-01-25 DIAGNOSIS — I27.82 OTHER CHRONIC PULMONARY EMBOLISM WITHOUT ACUTE COR PULMONALE (H): ICD-10-CM

## 2021-01-25 LAB — INR PPP: 2.4 (ref 0.9–1.1)

## 2021-02-02 ENCOUNTER — HOSPITAL ENCOUNTER (OUTPATIENT)
Dept: PALLIATIVE MEDICINE | Facility: OTHER | Age: 81
Discharge: HOME | End: 2021-02-02
Attending: ANESTHESIOLOGY

## 2021-02-02 DIAGNOSIS — M85.89 OSTEOPENIA OF MULTIPLE SITES: ICD-10-CM

## 2021-02-02 DIAGNOSIS — G90.523 COMPLEX REGIONAL PAIN SYNDROME TYPE 1 OF BOTH LOWER EXTREMITIES: ICD-10-CM

## 2021-02-02 ASSESSMENT — MIFFLIN-ST. JEOR: SCORE: 1102.31

## 2021-02-09 ENCOUNTER — OFFICE VISIT - HEALTHEAST (OUTPATIENT)
Dept: FAMILY MEDICINE | Facility: CLINIC | Age: 81
End: 2021-02-09

## 2021-02-09 ENCOUNTER — COMMUNICATION - HEALTHEAST (OUTPATIENT)
Dept: ANTICOAGULATION | Facility: CLINIC | Age: 81
End: 2021-02-09

## 2021-02-09 DIAGNOSIS — R41.3 MEMORY DIFFICULTIES: ICD-10-CM

## 2021-02-09 DIAGNOSIS — I27.82 OTHER CHRONIC PULMONARY EMBOLISM WITHOUT ACUTE COR PULMONALE (H): ICD-10-CM

## 2021-02-09 DIAGNOSIS — I10 ESSENTIAL HYPERTENSION: ICD-10-CM

## 2021-02-09 DIAGNOSIS — Z86.718 HISTORY OF BLOOD CLOTS: ICD-10-CM

## 2021-02-09 DIAGNOSIS — E03.9 ACQUIRED HYPOTHYROIDISM: ICD-10-CM

## 2021-02-09 DIAGNOSIS — D64.9 ANEMIA, UNSPECIFIED TYPE: ICD-10-CM

## 2021-02-09 DIAGNOSIS — I25.10 CORONARY ARTERY DISEASE INVOLVING NATIVE CORONARY ARTERY OF NATIVE HEART WITHOUT ANGINA PECTORIS: ICD-10-CM

## 2021-02-09 DIAGNOSIS — J01.90 ACUTE SINUSITIS WITH SYMPTOMS > 10 DAYS: ICD-10-CM

## 2021-02-09 DIAGNOSIS — N18.31 CHRONIC KIDNEY DISEASE (CKD) STAGE G3A/A1, MODERATELY DECREASED GLOMERULAR FILTRATION RATE (GFR) BETWEEN 45-59 ML/MIN/1.73 SQUARE METER AND ALBUMINURIA CREATININE RATIO LESS THAN 30 MG/G (H): ICD-10-CM

## 2021-02-09 LAB
ANION GAP SERPL CALCULATED.3IONS-SCNC: 13 MMOL/L (ref 5–18)
BASOPHILS # BLD AUTO: 0 THOU/UL (ref 0–0.2)
BASOPHILS NFR BLD AUTO: 1 % (ref 0–2)
BUN SERPL-MCNC: 19 MG/DL (ref 8–28)
CALCIUM SERPL-MCNC: 8.7 MG/DL (ref 8.5–10.5)
CHLORIDE BLD-SCNC: 101 MMOL/L (ref 98–107)
CHOLEST SERPL-MCNC: 167 MG/DL
CO2 SERPL-SCNC: 23 MMOL/L (ref 22–31)
CREAT SERPL-MCNC: 1.43 MG/DL (ref 0.6–1.1)
EOSINOPHIL # BLD AUTO: 0.1 THOU/UL (ref 0–0.4)
EOSINOPHIL NFR BLD AUTO: 1 % (ref 0–6)
ERYTHROCYTE [DISTWIDTH] IN BLOOD BY AUTOMATED COUNT: 14.6 % (ref 11–14.5)
FASTING STATUS PATIENT QL REPORTED: YES
GFR SERPL CREATININE-BSD FRML MDRD: 35 ML/MIN/1.73M2
GLUCOSE BLD-MCNC: 81 MG/DL (ref 70–125)
HCT VFR BLD AUTO: 37.1 % (ref 35–47)
HDLC SERPL-MCNC: 85 MG/DL
HGB BLD-MCNC: 12.1 G/DL (ref 12–16)
IMM GRANULOCYTES # BLD: 0 THOU/UL
IMM GRANULOCYTES NFR BLD: 0 %
INR PPP: 2.4 (ref 0.9–1.1)
LDLC SERPL CALC-MCNC: 65 MG/DL
LYMPHOCYTES # BLD AUTO: 1 THOU/UL (ref 0.8–4.4)
LYMPHOCYTES NFR BLD AUTO: 19 % (ref 20–40)
MCH RBC QN AUTO: 31.7 PG (ref 27–34)
MCHC RBC AUTO-ENTMCNC: 32.6 G/DL (ref 32–36)
MCV RBC AUTO: 97 FL (ref 80–100)
MONOCYTES # BLD AUTO: 0.7 THOU/UL (ref 0–0.9)
MONOCYTES NFR BLD AUTO: 14 % (ref 2–10)
NEUTROPHILS # BLD AUTO: 3.2 THOU/UL (ref 2–7.7)
NEUTROPHILS NFR BLD AUTO: 64 % (ref 50–70)
PLATELET # BLD AUTO: 199 THOU/UL (ref 140–440)
PMV BLD AUTO: 9.1 FL (ref 8.5–12.5)
POTASSIUM BLD-SCNC: 4 MMOL/L (ref 3.5–5)
RBC # BLD AUTO: 3.82 MILL/UL (ref 3.8–5.4)
SODIUM SERPL-SCNC: 137 MMOL/L (ref 136–145)
TRIGL SERPL-MCNC: 87 MG/DL
WBC: 5 THOU/UL (ref 4–11)

## 2021-02-12 ENCOUNTER — COMMUNICATION - HEALTHEAST (OUTPATIENT)
Dept: FAMILY MEDICINE | Facility: CLINIC | Age: 81
End: 2021-02-12

## 2021-02-22 ENCOUNTER — COMMUNICATION - HEALTHEAST (OUTPATIENT)
Dept: CARDIOLOGY | Facility: CLINIC | Age: 81
End: 2021-02-22

## 2021-02-22 DIAGNOSIS — I25.10 CORONARY ARTERY DISEASE INVOLVING NATIVE CORONARY ARTERY OF NATIVE HEART WITHOUT ANGINA PECTORIS: ICD-10-CM

## 2021-02-23 ENCOUNTER — COMMUNICATION - HEALTHEAST (OUTPATIENT)
Dept: FAMILY MEDICINE | Facility: CLINIC | Age: 81
End: 2021-02-23

## 2021-02-23 DIAGNOSIS — I27.82 OTHER CHRONIC PULMONARY EMBOLISM WITHOUT ACUTE COR PULMONALE (H): ICD-10-CM

## 2021-02-25 ENCOUNTER — COMMUNICATION - HEALTHEAST (OUTPATIENT)
Dept: ANTICOAGULATION | Facility: CLINIC | Age: 81
End: 2021-02-25

## 2021-02-25 ENCOUNTER — AMBULATORY - HEALTHEAST (OUTPATIENT)
Dept: LAB | Facility: CLINIC | Age: 81
End: 2021-02-25

## 2021-02-25 DIAGNOSIS — Z86.718 HISTORY OF BLOOD CLOTS: ICD-10-CM

## 2021-02-25 DIAGNOSIS — I27.82 OTHER CHRONIC PULMONARY EMBOLISM WITHOUT ACUTE COR PULMONALE (H): ICD-10-CM

## 2021-02-25 LAB — INR PPP: 1.4 (ref 0.9–1.1)

## 2021-03-08 ENCOUNTER — HOSPITAL ENCOUNTER (OUTPATIENT)
Dept: CARDIOLOGY | Facility: CLINIC | Age: 81
Discharge: HOME | End: 2021-03-08
Attending: INTERNAL MEDICINE

## 2021-03-08 ENCOUNTER — HOSPITAL ENCOUNTER (OUTPATIENT)
Dept: NUCLEAR MEDICINE | Facility: CLINIC | Age: 81
Discharge: HOME | End: 2021-03-08
Attending: INTERNAL MEDICINE

## 2021-03-08 DIAGNOSIS — I25.10 CORONARY ARTERY DISEASE INVOLVING NATIVE CORONARY ARTERY OF NATIVE HEART WITHOUT ANGINA PECTORIS: ICD-10-CM

## 2021-03-09 ENCOUNTER — RECORDS - HEALTHEAST (OUTPATIENT)
Dept: BONE DENSITY | Facility: CLINIC | Age: 81
End: 2021-03-09

## 2021-03-09 ENCOUNTER — RECORDS - HEALTHEAST (OUTPATIENT)
Dept: ADMINISTRATIVE | Facility: OTHER | Age: 81
End: 2021-03-09

## 2021-03-09 DIAGNOSIS — Z78.0 ASYMPTOMATIC MENOPAUSAL STATE: ICD-10-CM

## 2021-03-11 ENCOUNTER — VIRTUAL VISIT (OUTPATIENT)
Dept: PHYSICAL MEDICINE AND REHAB | Facility: CLINIC | Age: 81
End: 2021-03-11
Attending: FAMILY MEDICINE
Payer: COMMERCIAL

## 2021-03-11 ENCOUNTER — COMMUNICATION - HEALTHEAST (OUTPATIENT)
Dept: ANTICOAGULATION | Facility: CLINIC | Age: 81
End: 2021-03-11

## 2021-03-11 VITALS — WEIGHT: 142 LBS | HEIGHT: 63 IN | BODY MASS INDEX: 25.16 KG/M2

## 2021-03-11 DIAGNOSIS — Z86.16 HISTORY OF COVID-19: Primary | ICD-10-CM

## 2021-03-11 DIAGNOSIS — I27.82 OTHER CHRONIC PULMONARY EMBOLISM WITHOUT ACUTE COR PULMONALE (H): ICD-10-CM

## 2021-03-11 DIAGNOSIS — B94.8 POST VIRAL DEBILITY: ICD-10-CM

## 2021-03-11 DIAGNOSIS — S09.90XA HEAD INJURY, INITIAL ENCOUNTER: ICD-10-CM

## 2021-03-11 DIAGNOSIS — R53.81 POST VIRAL DEBILITY: ICD-10-CM

## 2021-03-11 PROCEDURE — 99203 OFFICE O/P NEW LOW 30 MIN: CPT | Mod: 95 | Performed by: PHYSICAL MEDICINE & REHABILITATION

## 2021-03-11 RX ORDER — ATORVASTATIN CALCIUM 40 MG/1
TABLET, FILM COATED ORAL EVERY 24 HOURS
COMMUNITY
End: 2021-08-13

## 2021-03-11 RX ORDER — SUMATRIPTAN 50 MG/1
50 TABLET, FILM COATED ORAL
COMMUNITY
Start: 2020-09-16 | End: 2022-02-18

## 2021-03-11 RX ORDER — LAMOTRIGINE 100 MG/1
200 TABLET ORAL 2 TIMES DAILY
COMMUNITY
Start: 2020-12-03 | End: 2021-09-23

## 2021-03-11 RX ORDER — ZONISAMIDE 25 MG/1
50 CAPSULE ORAL DAILY
COMMUNITY
Start: 2020-12-03 | End: 2021-09-23

## 2021-03-11 RX ORDER — FENTANYL 75 UG/1
1 PATCH TRANSDERMAL
COMMUNITY
Start: 2020-10-26 | End: 2021-07-19

## 2021-03-11 RX ORDER — TRAZODONE HYDROCHLORIDE 100 MG/1
400 TABLET ORAL AT BEDTIME
COMMUNITY
Start: 2020-10-12 | End: 2021-09-23

## 2021-03-11 RX ORDER — LEVOTHYROXINE SODIUM 50 UG/1
50 TABLET ORAL EVERY MORNING
COMMUNITY
Start: 2020-12-18 | End: 2021-12-08

## 2021-03-11 RX ORDER — LISINOPRIL 20 MG/1
20 TABLET ORAL
COMMUNITY
Start: 2020-11-19 | End: 2021-09-17

## 2021-03-11 RX ORDER — AZELASTINE HCL 205.5 UG/1
2 SPRAY NASAL 2 TIMES DAILY PRN
COMMUNITY
End: 2022-09-27

## 2021-03-11 SDOH — HEALTH STABILITY: MENTAL HEALTH: HOW OFTEN DO YOU HAVE A DRINK CONTAINING ALCOHOL?: MONTHLY OR LESS

## 2021-03-11 SDOH — HEALTH STABILITY: MENTAL HEALTH: HOW MANY STANDARD DRINKS CONTAINING ALCOHOL DO YOU HAVE ON A TYPICAL DAY?: NOT ASKED

## 2021-03-11 SDOH — HEALTH STABILITY: MENTAL HEALTH: HOW OFTEN DO YOU HAVE 6 OR MORE DRINKS ON ONE OCCASION?: NOT ASKED

## 2021-03-11 ASSESSMENT — ANXIETY QUESTIONNAIRES
7. FEELING AFRAID AS IF SOMETHING AWFUL MIGHT HAPPEN: NOT AT ALL
1. FEELING NERVOUS, ANXIOUS, OR ON EDGE: SEVERAL DAYS
4. TROUBLE RELAXING: MORE THAN HALF THE DAYS
6. BECOMING EASILY ANNOYED OR IRRITABLE: SEVERAL DAYS
3. WORRYING TOO MUCH ABOUT DIFFERENT THINGS: SEVERAL DAYS
5. BEING SO RESTLESS THAT IT IS HARD TO SIT STILL: SEVERAL DAYS
7. FEELING AFRAID AS IF SOMETHING AWFUL MIGHT HAPPEN: NOT AT ALL
GAD7 TOTAL SCORE: 7
2. NOT BEING ABLE TO STOP OR CONTROL WORRYING: SEVERAL DAYS

## 2021-03-11 ASSESSMENT — PATIENT HEALTH QUESTIONNAIRE - PHQ9
SUM OF ALL RESPONSES TO PHQ QUESTIONS 1-9: 10
SUM OF ALL RESPONSES TO PHQ QUESTIONS 1-9: 10
10. IF YOU CHECKED OFF ANY PROBLEMS, HOW DIFFICULT HAVE THESE PROBLEMS MADE IT FOR YOU TO DO YOUR WORK, TAKE CARE OF THINGS AT HOME, OR GET ALONG WITH OTHER PEOPLE: SOMEWHAT DIFFICULT

## 2021-03-11 ASSESSMENT — MIFFLIN-ST. JEOR: SCORE: 1093.24

## 2021-03-11 ASSESSMENT — PAIN SCALES - GENERAL: PAINLEVEL: MODERATE PAIN (5)

## 2021-03-11 NOTE — PROGRESS NOTES
Sandy is a 80 year old who is being evaluated via a billable telephone visit.      What phone number would you like to be contacted at? [phone]  How would you like to obtain your AVS? [facility]              PM&R Clinic Note     Patient Name: Sandy Spencer : 1940 Medical Record: [id number]     Requesting Physician/clinician: Caitlyn Rees MD           History of Present Illness:     Sandy Spencer is a 80 year old female that per records and reporting had Covid twice, once in 2020 and another time in 2020.   Back in May 2020 horrible headache, with history of RSD, this intensified, so she than tested positive pre-procedure.  She had than quarantined for 14 days.  She felt awful, but thankfully she did not need hospitalization.  Earache, sore throat.  Than about 1 month later she began to feel some better.  She goes for PT for RSD and restarted this.  Than she had summer, did alright.  But due to recent kidney surgery was monitored closely.  Than in 2020 she had another outbreak, tested + again.  She quarantined again 14 days, sore throat and ear ache, as well as muscle aches, but no headache.  Now she is not near 100%.  She states mental fatigue as well as fog, poor memory.  Also she has hit her head on cupboard, she hit her head, there was a gash.  This was in January.  Hit her head twice once in [facility].    She was on coumadin and has since been on eliquis.            Past Medical and Surgical History:     No past medical history on file.  No past surgical history on file.         Social History:     Social History     Tobacco Use     Smoking status: Former Smoker     Smokeless tobacco: Never Used   Substance Use Topics     Alcohol use: Yes     Frequency: Monthly or less     Living situation: condo  Family support: yes   Vocational History: retired  Alcohol use: none   Recreational drug use: none          Functional history:     Sandy Spencer is independent with all aspects of  "life.    ADLs: I   Assistive devices: none   iADLs (medication management and finances): I, some confusion with bills   Hand dominance: R  Driving:  Yes, limited            Family History:     No family history on file.         Medications:     Current Outpatient Medications   Medication Sig Dispense Refill     apixaban ANTICOAGULANT (ELIQUIS) 5 MG tablet Take 5 mg by mouth       fentaNYL (DURAGESIC) 75 mcg/hr 72 hr patch Place 1 patch onto the skin       lamoTRIgine (LAMICTAL) 100 MG tablet lamotrigine 100 mg tablet       levothyroxine (SYNTHROID/LEVOTHROID) 100 MCG tablet Take 50 mcg by mouth       lisinopril (ZESTRIL) 20 MG tablet Take 20 mg by mouth       SUMAtriptan (IMITREX) 50 MG tablet Take 50 mg by mouth       traZODone (DESYREL) 100 MG tablet trazodone 100 mg tablet       zonisamide (ZONEGRAN) 25 MG capsule Take 25 mg by mouth       atorvastatin (LIPITOR) 40 MG tablet every 24 hours       azelastine (ASTEPRO) 0.15 % nasal spray two times a day. Use in each nostril as directed       Cholecalciferol 1.25 MG (83553 UT) TABS Take 5,000 Units by mouth       influenza trivalent-split vaccine for ages 3yrs and older, PF, (FLUZONE, AGES 3YRS AND OLDER,) 0.5 ML injection Afluria [id number](PF) 45 mcg (15 mcg x 3)/0.5 mL intramuscular syringe   TO BE ADMINISTERED BY PHARMACIST FOR IMMUNIZATION       magnesium sulfate 500 mg/mL SOLN               Allergies:     Allergies   Allergen Reactions     Acamprosate Other (See Comments) and Nausea and Vomiting     Nausea, vomiting and headache       Carbamazepine Other (See Comments)     Patient felt \"drunk\".      Cyclobenzaprine Other (See Comments), Shortness Of Breath and Unknown     Mouth ulcers  Per pt  Mouth sores       Pregabalin Anaphylaxis, Shortness Of Breath, Other (See Comments) and Unknown     Throat closes  Per pt       Sulfa Drugs Anaphylaxis, Shortness Of Breath and Unknown         Per pt       Zolpidem Other (See Comments)     Sleep walking  Other reaction(s): " "\"Sleep Walking\"       Acetaminophen Other (See Comments)     Rebound headaches       Amiloride Swelling and Unknown     unknown  Per pt       Amoxicillin Diarrhea, Nausea and Vomiting and Unknown     Aspirin Other (See Comments)     History of gastric ulcers and instructed to not take more than 81 mg/d, 10/5/17 takes 81 mg at home  Stomach        Chromium Other (See Comments)     Staples: Reaction to all metals.States serious reactions after surgery   Staples: Reaction to all metals.States serious reactions after surgery        Duloxetine Hcl Unknown     Per pt     Nsaids Other (See Comments)     H/o ulcers  Stomach ulcers       Other Drug Allergy (See Comments)       and Staples: turned black into the groin, was told to never have staples again     Oxycodone Headache     Serotonin Other (See Comments)     Sulindac      Tolmetin Unknown and Other (See Comments)     History of ulcer  Hx of an ulcer       Verapamil Other (See Comments)     Bradycardia     Valproic Acid Rash              ROS:      ROS: 10 point ROS neg other than the symptoms noted above in the HPI.             Physical Examiniation:     VITAL SIGNS: Ht 1.6 m (5' 3\")   Wt 64.4 kg (142 lb)   BMI 25.15 kg/m    BMI: Estimated body mass index is 25.15 kg/m  as calculated from the following:    Height as of this encounter: 1.6 m (5' 3\").    Weight as of this encounter: 64.4 kg (142 lb).                Laboratory/Imaging:     COVID-19 Antibody Results, Testing for Immunity    COVID-19 Antibody Results, Testing for Immunity   No data to display.         COVID-19 PCR Results    COVID-19 PCR Results 5/10/20 1/5/21   COVID-19 Virus PCR to [facility] - Result Detected, Abnormal Result (A)    COVID-19 Virus PCR to [facility] - Source Nasopharyngeal    COVID-19 Virus by PCR (External Result)  Detected (A)   (A) Abnormal value       Comments are available for some flowsheets but are not being displayed.                      Assessment/Plan:     Sandy was seen today for " covid concern.    Diagnoses and all orders for this visit:    History of COVID-19  -     PULMONARY REHAB REFERRAL; Future  -     OCCUPATIONAL THERAPY REFERRAL; Future    Post viral debility  -     PULMONARY REHAB REFERRAL; Future  -     OCCUPATIONAL THERAPY REFERRAL; Future    Head injury, initial encounter  -     CT Head w/o Contrast; Future    Other orders  -     Cancel: CT Head w/o Contrast; Standing  -     Cancel: CT Head w/o Contrast          1. Patient education: In depth discussion and education was provided about the assessment and implications of each of the below recommendations for management. Patient indicated readiness to learn, all questions were answered and understanding of material presented was confirmed.    2. Work-up:  Given cognitive changes possible post viral vs concussion and given anticoagulation will get Head CT.      3. Therapy/equipment/braces: start outpatient therapy program    4. Medications: no additions or changes    5. Interventions:  Discussed exercise for brain and body health and progressive return to activity     6. Referral / follow up with other providers:   PCP       7. Follow up: 2-3 months for progress, will monitor Head CT results.        Physical Medicine & Rehabilitation    I spent a total of 27 minutes with Sandy Spencer during today's office telephone visit. Over 50% of this time was spent counseling the patient and/or coordinating care. See note for details.     37 minutes spent on the date of the encounter doing chart review, history and exam, documentation and further activities as noted above                  Phone call duration: 27 minutes  Answers for HPI/ROS submitted by the patient on 3/11/2021   KT 7 TOTAL SCORE: 7  If you checked off any problems, how difficult have these problems made it for you to do your work, take care of things at home, or get along with other people?: Somewhat difficult  PHQ9 TOTAL SCORE: 10

## 2021-03-12 ENCOUNTER — HOSPITAL ENCOUNTER (OUTPATIENT)
Dept: CARDIOLOGY | Facility: CLINIC | Age: 81
Discharge: HOME | End: 2021-03-12
Attending: INTERNAL MEDICINE

## 2021-03-12 ASSESSMENT — ANXIETY QUESTIONNAIRES: GAD7 TOTAL SCORE: 7

## 2021-03-15 ENCOUNTER — COMMUNICATION - HEALTHEAST (OUTPATIENT)
Dept: SCHEDULING | Facility: CLINIC | Age: 81
End: 2021-03-15

## 2021-03-16 ENCOUNTER — COMMUNICATION - HEALTHEAST (OUTPATIENT)
Dept: PALLIATIVE MEDICINE | Facility: OTHER | Age: 81
End: 2021-03-16

## 2021-03-16 ENCOUNTER — COMMUNICATION - HEALTHEAST (OUTPATIENT)
Dept: CARDIOLOGY | Facility: CLINIC | Age: 81
End: 2021-03-16

## 2021-03-16 ENCOUNTER — COMMUNICATION - HEALTHEAST (OUTPATIENT)
Dept: ENDOCRINOLOGY | Facility: CLINIC | Age: 81
End: 2021-03-16

## 2021-03-16 ENCOUNTER — RECORDS - HEALTHEAST (OUTPATIENT)
Dept: ADMINISTRATIVE | Facility: OTHER | Age: 81
End: 2021-03-16

## 2021-03-16 DIAGNOSIS — E78.2 MIXED HYPERLIPIDEMIA: ICD-10-CM

## 2021-03-16 DIAGNOSIS — G90.523 COMPLEX REGIONAL PAIN SYNDROME TYPE 1 OF BOTH LOWER EXTREMITIES: ICD-10-CM

## 2021-03-17 ENCOUNTER — COMMUNICATION - HEALTHEAST (OUTPATIENT)
Dept: PALLIATIVE MEDICINE | Facility: OTHER | Age: 81
End: 2021-03-17

## 2021-03-17 ENCOUNTER — RECORDS - HEALTHEAST (OUTPATIENT)
Dept: ADMINISTRATIVE | Facility: OTHER | Age: 81
End: 2021-03-17

## 2021-03-23 ENCOUNTER — OFFICE VISIT - HEALTHEAST (OUTPATIENT)
Dept: FAMILY MEDICINE | Facility: CLINIC | Age: 81
End: 2021-03-23

## 2021-03-23 ENCOUNTER — COMMUNICATION - HEALTHEAST (OUTPATIENT)
Dept: FAMILY MEDICINE | Facility: CLINIC | Age: 81
End: 2021-03-23

## 2021-03-23 DIAGNOSIS — I10 ESSENTIAL HYPERTENSION: ICD-10-CM

## 2021-03-23 DIAGNOSIS — F32.1 MODERATE MAJOR DEPRESSION (H): ICD-10-CM

## 2021-03-23 DIAGNOSIS — I27.82 OTHER CHRONIC PULMONARY EMBOLISM WITHOUT ACUTE COR PULMONALE (H): ICD-10-CM

## 2021-03-23 DIAGNOSIS — S62.91XD CLOSED FRACTURE OF RIGHT HAND WITH ROUTINE HEALING, SUBSEQUENT ENCOUNTER: ICD-10-CM

## 2021-03-23 DIAGNOSIS — H04.123 DRY EYES: ICD-10-CM

## 2021-03-23 DIAGNOSIS — R82.90 ABNORMAL URINALYSIS: ICD-10-CM

## 2021-03-23 DIAGNOSIS — S06.0X9D CONCUSSION WITH LOSS OF CONSCIOUSNESS, SUBSEQUENT ENCOUNTER: ICD-10-CM

## 2021-03-23 DIAGNOSIS — F11.20 OPIOID TYPE DEPENDENCE, CONTINUOUS (H): ICD-10-CM

## 2021-03-23 DIAGNOSIS — S52.514D CLOSED NONDISPLACED FRACTURE OF STYLOID PROCESS OF RIGHT RADIUS WITH ROUTINE HEALING, SUBSEQUENT ENCOUNTER: ICD-10-CM

## 2021-03-23 DIAGNOSIS — Z87.440 PERSONAL HISTORY OF URINARY TRACT INFECTION: ICD-10-CM

## 2021-03-23 LAB
ALBUMIN UR-MCNC: ABNORMAL G/DL
APPEARANCE UR: CLEAR
BILIRUB UR QL STRIP: NEGATIVE
COLOR UR AUTO: YELLOW
GLUCOSE UR STRIP-MCNC: NEGATIVE MG/DL
HGB UR QL STRIP: NEGATIVE
KETONES UR STRIP-MCNC: ABNORMAL MG/DL
LEUKOCYTE ESTERASE UR QL STRIP: NEGATIVE
NITRATE UR QL: NEGATIVE
PH UR STRIP: 6.5 [PH] (ref 5–8)
SP GR UR STRIP: 1.02 (ref 1–1.03)
UROBILINOGEN UR STRIP-ACNC: ABNORMAL

## 2021-03-24 LAB — BACTERIA SPEC CULT: NO GROWTH

## 2021-03-25 ENCOUNTER — COMMUNICATION - HEALTHEAST (OUTPATIENT)
Dept: FAMILY MEDICINE | Facility: CLINIC | Age: 81
End: 2021-03-25

## 2021-03-26 ENCOUNTER — AMBULATORY - HEALTHEAST (OUTPATIENT)
Dept: PHARMACY | Facility: HOSPITAL | Age: 81
End: 2021-03-26

## 2021-03-30 ENCOUNTER — COMMUNICATION - HEALTHEAST (OUTPATIENT)
Dept: CARDIOLOGY | Facility: CLINIC | Age: 81
End: 2021-03-30

## 2021-03-30 ENCOUNTER — RECORDS - HEALTHEAST (OUTPATIENT)
Dept: ADMINISTRATIVE | Facility: OTHER | Age: 81
End: 2021-03-30

## 2021-04-06 ENCOUNTER — AMBULATORY - HEALTHEAST (OUTPATIENT)
Dept: CARDIOLOGY | Facility: CLINIC | Age: 81
End: 2021-04-06

## 2021-04-06 DIAGNOSIS — I25.10 CORONARY ARTERY DISEASE INVOLVING NATIVE CORONARY ARTERY OF NATIVE HEART WITHOUT ANGINA PECTORIS: ICD-10-CM

## 2021-04-07 ENCOUNTER — RECORDS - HEALTHEAST (OUTPATIENT)
Dept: ADMINISTRATIVE | Facility: OTHER | Age: 81
End: 2021-04-07

## 2021-04-15 ENCOUNTER — HOSPITAL ENCOUNTER (OUTPATIENT)
Dept: CT IMAGING | Facility: CLINIC | Age: 81
Discharge: HOME | End: 2021-04-15
Attending: PHYSICIAN ASSISTANT

## 2021-04-15 DIAGNOSIS — R35.0 URINARY FREQUENCY: ICD-10-CM

## 2021-04-15 DIAGNOSIS — R30.0 DYSURIA: ICD-10-CM

## 2021-04-16 ENCOUNTER — HOSPITAL ENCOUNTER (OUTPATIENT)
Dept: NUCLEAR MEDICINE | Facility: CLINIC | Age: 81
Discharge: HOME | End: 2021-04-16
Attending: INTERNAL MEDICINE

## 2021-04-16 ENCOUNTER — HOSPITAL ENCOUNTER (OUTPATIENT)
Dept: CARDIOLOGY | Facility: CLINIC | Age: 81
Discharge: HOME | End: 2021-04-16
Attending: INTERNAL MEDICINE

## 2021-04-16 DIAGNOSIS — I25.10 CORONARY ARTERY DISEASE INVOLVING NATIVE CORONARY ARTERY OF NATIVE HEART WITHOUT ANGINA PECTORIS: ICD-10-CM

## 2021-04-16 LAB
CV STRESS CURRENT BP HE: NORMAL
CV STRESS CURRENT HR HE: 100
CV STRESS CURRENT HR HE: 101
CV STRESS CURRENT HR HE: 104
CV STRESS CURRENT HR HE: 113
CV STRESS CURRENT HR HE: 114
CV STRESS CURRENT HR HE: 68
CV STRESS CURRENT HR HE: 68
CV STRESS CURRENT HR HE: 76
CV STRESS CURRENT HR HE: 89
CV STRESS CURRENT HR HE: 89
CV STRESS CURRENT HR HE: 90
CV STRESS CURRENT HR HE: 94
CV STRESS CURRENT HR HE: 94
CV STRESS CURRENT HR HE: 95
CV STRESS CURRENT HR HE: 95
CV STRESS DEVIATION TIME HE: NORMAL
CV STRESS ECHO PERCENT HR HE: NORMAL
CV STRESS EXERCISE STAGE HE: NORMAL
CV STRESS FINAL RESTING BP HE: NORMAL
CV STRESS FINAL RESTING HR HE: 89
CV STRESS MAX HR HE: 115
CV STRESS MAX TREADMILL GRADE HE: 0
CV STRESS MAX TREADMILL SPEED HE: 0
CV STRESS PEAK DIA BP HE: NORMAL
CV STRESS PEAK SYS BP HE: NORMAL
CV STRESS PHASE HE: NORMAL
CV STRESS PROTOCOL HE: NORMAL
CV STRESS RESTING PT POSITION HE: NORMAL
CV STRESS ST DEVIATION AMOUNT HE: NORMAL
CV STRESS ST DEVIATION ELEVATION HE: NORMAL
CV STRESS ST EVELATION AMOUNT HE: NORMAL
CV STRESS TEST TYPE HE: NORMAL
CV STRESS TOTAL STAGE TIME MIN 1 HE: NORMAL
RATE PRESSURE PRODUCT: NORMAL
STRESS ECHO BASELINE DIASTOLIC HE: 89
STRESS ECHO BASELINE HR: 76
STRESS ECHO BASELINE SYSTOLIC BP: 188
STRESS ECHO CALCULATED PERCENT HR: 81 %
STRESS ECHO LAST STRESS DIASTOLIC BP: 103
STRESS ECHO LAST STRESS HR: 101
STRESS ECHO LAST STRESS SYSTOLIC BP: 163
STRESS ECHO TARGET HR: 142

## 2021-04-19 ENCOUNTER — RECORDS - HEALTHEAST (OUTPATIENT)
Dept: ADMINISTRATIVE | Facility: OTHER | Age: 81
End: 2021-04-19

## 2021-04-20 ENCOUNTER — HOSPITAL ENCOUNTER (OUTPATIENT)
Dept: PALLIATIVE MEDICINE | Facility: OTHER | Age: 81
Discharge: HOME | End: 2021-04-20
Attending: ANESTHESIOLOGY

## 2021-04-20 DIAGNOSIS — G47.00 INSOMNIA, UNSPECIFIED TYPE: ICD-10-CM

## 2021-04-20 DIAGNOSIS — G90.523 COMPLEX REGIONAL PAIN SYNDROME TYPE 1 OF BOTH LOWER EXTREMITIES: ICD-10-CM

## 2021-04-20 DIAGNOSIS — G89.4 CHRONIC PAIN SYNDROME: ICD-10-CM

## 2021-04-20 DIAGNOSIS — M54.16 LUMBAR RADICULOPATHY: ICD-10-CM

## 2021-04-21 ENCOUNTER — COMMUNICATION - HEALTHEAST (OUTPATIENT)
Dept: CARDIOLOGY | Facility: CLINIC | Age: 81
End: 2021-04-21

## 2021-04-21 DIAGNOSIS — E78.2 MIXED HYPERLIPIDEMIA: ICD-10-CM

## 2021-04-23 ENCOUNTER — DOCUMENTATION ONLY (OUTPATIENT)
Dept: OTHER | Facility: CLINIC | Age: 81
End: 2021-04-23

## 2021-04-23 ENCOUNTER — OFFICE VISIT - HEALTHEAST (OUTPATIENT)
Dept: FAMILY MEDICINE | Facility: CLINIC | Age: 81
End: 2021-04-23

## 2021-04-23 ENCOUNTER — AMBULATORY - HEALTHEAST (OUTPATIENT)
Dept: OTHER | Facility: CLINIC | Age: 81
End: 2021-04-23

## 2021-04-23 DIAGNOSIS — M54.16 LUMBAR RADICULOPATHY: ICD-10-CM

## 2021-04-23 DIAGNOSIS — F07.81 POST CONCUSSION SYNDROME: ICD-10-CM

## 2021-04-23 DIAGNOSIS — I10 ESSENTIAL HYPERTENSION: ICD-10-CM

## 2021-04-23 DIAGNOSIS — R41.3 MEMORY DIFFICULTIES: ICD-10-CM

## 2021-04-23 LAB
ANION GAP SERPL CALCULATED.3IONS-SCNC: 10 MMOL/L (ref 5–18)
BUN SERPL-MCNC: 26 MG/DL (ref 8–28)
CALCIUM SERPL-MCNC: 8.9 MG/DL (ref 8.5–10.5)
CHLORIDE BLD-SCNC: 103 MMOL/L (ref 98–107)
CO2 SERPL-SCNC: 24 MMOL/L (ref 22–31)
CREAT SERPL-MCNC: 1.21 MG/DL (ref 0.6–1.1)
GFR SERPL CREATININE-BSD FRML MDRD: 43 ML/MIN/1.73M2
GLUCOSE BLD-MCNC: 82 MG/DL (ref 70–125)
POTASSIUM BLD-SCNC: 4.2 MMOL/L (ref 3.5–5)
SODIUM SERPL-SCNC: 137 MMOL/L (ref 136–145)

## 2021-04-24 ENCOUNTER — HEALTH MAINTENANCE LETTER (OUTPATIENT)
Age: 81
End: 2021-04-24

## 2021-04-25 LAB
6MAM UR QL: NOT DETECTED
7AMINOCLONAZEPAM UR QL: NOT DETECTED
A-OH ALPRAZ UR QL: NOT DETECTED
ALPHA-OH-MIDAZOLAM (CUTOFF 20 NG/ML) - HISTORICAL: NOT DETECTED
ALPRAZ UR QL: NOT DETECTED
AMPHET UR QL SCN: NOT DETECTED
BARBITURATES UR QL: NOT DETECTED
BUPRENORPHINE UR QL: NOT DETECTED
BZE UR QL: NOT DETECTED
CARBOXYTHC UR QL: NOT DETECTED
CARISOPRODOL UR QL: NOT DETECTED
CLONAZEPAM UR QL: NOT DETECTED
CODEINE UR QL: NOT DETECTED
CREAT UR-MCNC: 82 MG/DL (ref 20–400)
DIAZEPAM UR QL: NOT DETECTED
ETHYL GLUCURONIDE UR QL: NOT DETECTED
FENTANYL UR QL: PRESENT
GABAPENTIN (CUTOFF 100 NG/ML) - HISTORICAL: NOT DETECTED
HYDROCODONE UR QL: NOT DETECTED
HYDROMORPHONE UR QL: NOT DETECTED
LORAZEPAM UR QL: NOT DETECTED
MDA UR QL: NOT DETECTED
MDEA UR QL: NOT DETECTED
MDMA UR QL: NOT DETECTED
ME-PHENIDATE UR QL: NOT DETECTED
MEPERIDINE UR QL: NOT DETECTED
METHADONE UR QL: NOT DETECTED
METHAMPHET UR QL: NOT DETECTED
MIDAZOLAM UR QL SCN: NOT DETECTED
MORPHINE UR QL: NOT DETECTED
NALOXONE (CUTOFF 100 NG/ML) - HISTORICAL: NOT DETECTED
NORBUPRENORPHINE UR QL CFM: NOT DETECTED
NORDIAZEPAM UR QL: NOT DETECTED
NORFENTANYL UR QL: PRESENT
NORHYDROCODONE UR QL CFM: NOT DETECTED
NOROXYCODONE UR QL CFM: NOT DETECTED
NOROXYMORPHONE UR QL SCN: NOT DETECTED
OXAZEPAM UR QL: NOT DETECTED
OXYCODONE UR QL: NOT DETECTED
OXYMORPHONE UR QL: NOT DETECTED
PATHOLOGY STUDY: NORMAL
PCP UR QL: NOT DETECTED
PHENTERMINE UR QL: NOT DETECTED
PREGABALIN (CUTOFF 100 NG/ML) - HISTORICAL: NOT DETECTED
SERVICE CMNT-IMP: NORMAL
TAPENTADOL UR QL SCN: NOT DETECTED
TAPENTADOL UR QL SCN: NOT DETECTED
TEMAZEPAM UR QL: NOT DETECTED
TRAMADOL UR QL: NOT DETECTED
ZOLPIDEM METABOLITE (CUTOFF 100 NG/ML) - HISTORICAL: NOT DETECTED
ZOLPIDEM UR QL: NOT DETECTED

## 2021-04-26 ENCOUNTER — COMMUNICATION - HEALTHEAST (OUTPATIENT)
Dept: FAMILY MEDICINE | Facility: CLINIC | Age: 81
End: 2021-04-26

## 2021-04-28 ENCOUNTER — COMMUNICATION - HEALTHEAST (OUTPATIENT)
Dept: PALLIATIVE MEDICINE | Facility: OTHER | Age: 81
End: 2021-04-28

## 2021-04-28 ENCOUNTER — COMMUNICATION - HEALTHEAST (OUTPATIENT)
Dept: SCHEDULING | Facility: CLINIC | Age: 81
End: 2021-04-28

## 2021-04-28 DIAGNOSIS — I10 ESSENTIAL HYPERTENSION: ICD-10-CM

## 2021-05-06 ENCOUNTER — OFFICE VISIT - HEALTHEAST (OUTPATIENT)
Dept: OCCUPATIONAL THERAPY | Facility: REHABILITATION | Age: 81
End: 2021-05-06

## 2021-05-06 DIAGNOSIS — R29.898 RIGHT HAND WEAKNESS: ICD-10-CM

## 2021-05-06 DIAGNOSIS — M79.644 PAIN IN FINGER OF RIGHT HAND: ICD-10-CM

## 2021-05-06 DIAGNOSIS — Z78.9 DECREASED ACTIVITIES OF DAILY LIVING (ADL): ICD-10-CM

## 2021-05-12 ENCOUNTER — COMMUNICATION - HEALTHEAST (OUTPATIENT)
Dept: CARDIOLOGY | Facility: CLINIC | Age: 81
End: 2021-05-12

## 2021-05-13 ENCOUNTER — OFFICE VISIT - HEALTHEAST (OUTPATIENT)
Dept: OCCUPATIONAL THERAPY | Facility: REHABILITATION | Age: 81
End: 2021-05-13

## 2021-05-13 DIAGNOSIS — R29.898 RIGHT HAND WEAKNESS: ICD-10-CM

## 2021-05-13 DIAGNOSIS — Z78.9 DECREASED ACTIVITIES OF DAILY LIVING (ADL): ICD-10-CM

## 2021-05-13 DIAGNOSIS — M79.644 PAIN IN FINGER OF RIGHT HAND: ICD-10-CM

## 2021-05-19 ENCOUNTER — OFFICE VISIT - HEALTHEAST (OUTPATIENT)
Dept: NEUROLOGY | Facility: CLINIC | Age: 81
End: 2021-05-19

## 2021-05-19 DIAGNOSIS — G44.309 POST-CONCUSSION HEADACHE: ICD-10-CM

## 2021-05-19 DIAGNOSIS — G47.00 INSOMNIA, UNSPECIFIED TYPE: ICD-10-CM

## 2021-05-19 DIAGNOSIS — R41.3 MEMORY DIFFICULTIES: ICD-10-CM

## 2021-05-19 DIAGNOSIS — F07.81 POST CONCUSSION SYNDROME: ICD-10-CM

## 2021-05-20 ENCOUNTER — RECORDS - HEALTHEAST (OUTPATIENT)
Dept: ADMINISTRATIVE | Facility: OTHER | Age: 81
End: 2021-05-20

## 2021-05-21 ENCOUNTER — OFFICE VISIT - HEALTHEAST (OUTPATIENT)
Dept: FAMILY MEDICINE | Facility: CLINIC | Age: 81
End: 2021-05-21

## 2021-05-21 DIAGNOSIS — F07.81 POST CONCUSSION SYNDROME: ICD-10-CM

## 2021-05-21 DIAGNOSIS — F40.240 CLAUSTROPHOBIA: ICD-10-CM

## 2021-05-21 DIAGNOSIS — N18.31 CHRONIC KIDNEY DISEASE (CKD) STAGE G3A/A1, MODERATELY DECREASED GLOMERULAR FILTRATION RATE (GFR) BETWEEN 45-59 ML/MIN/1.73 SQUARE METER AND ALBUMINURIA CREATININE RATIO LESS THAN 30 MG/G (H): ICD-10-CM

## 2021-05-21 LAB
ALBUMIN SERPL-MCNC: 4.1 G/DL (ref 3.5–5)
ALP SERPL-CCNC: 55 U/L (ref 45–120)
ALT SERPL W P-5'-P-CCNC: 28 U/L (ref 0–45)
ANION GAP SERPL CALCULATED.3IONS-SCNC: 10 MMOL/L (ref 5–18)
AST SERPL W P-5'-P-CCNC: 28 U/L (ref 0–40)
BILIRUB SERPL-MCNC: 0.5 MG/DL (ref 0–1)
BUN SERPL-MCNC: 22 MG/DL (ref 8–28)
CALCIUM SERPL-MCNC: 9.2 MG/DL (ref 8.5–10.5)
CHLORIDE BLD-SCNC: 103 MMOL/L (ref 98–107)
CO2 SERPL-SCNC: 24 MMOL/L (ref 22–31)
CREAT SERPL-MCNC: 1.38 MG/DL (ref 0.6–1.1)
GFR SERPL CREATININE-BSD FRML MDRD: 37 ML/MIN/1.73M2
GLUCOSE BLD-MCNC: 80 MG/DL (ref 70–125)
HGB BLD-MCNC: 12.3 G/DL (ref 12–16)
POTASSIUM BLD-SCNC: 4.4 MMOL/L (ref 3.5–5)
PROT SERPL-MCNC: 6.5 G/DL (ref 6–8)
SODIUM SERPL-SCNC: 137 MMOL/L (ref 136–145)

## 2021-05-26 ENCOUNTER — COMMUNICATION - HEALTHEAST (OUTPATIENT)
Dept: FAMILY MEDICINE | Facility: CLINIC | Age: 81
End: 2021-05-26

## 2021-05-26 VITALS
TEMPERATURE: 99.3 F | HEART RATE: 78 BPM | SYSTOLIC BLOOD PRESSURE: 134 MMHG | DIASTOLIC BLOOD PRESSURE: 71 MMHG | OXYGEN SATURATION: 97 %

## 2021-05-26 VITALS
OXYGEN SATURATION: 96 % | TEMPERATURE: 98.4 F | HEART RATE: 71 BPM | SYSTOLIC BLOOD PRESSURE: 153 MMHG | DIASTOLIC BLOOD PRESSURE: 71 MMHG

## 2021-05-26 VITALS
DIASTOLIC BLOOD PRESSURE: 70 MMHG | TEMPERATURE: 98.7 F | HEART RATE: 68 BPM | SYSTOLIC BLOOD PRESSURE: 144 MMHG | OXYGEN SATURATION: 96 %

## 2021-05-26 ASSESSMENT — PATIENT HEALTH QUESTIONNAIRE - PHQ9: SUM OF ALL RESPONSES TO PHQ QUESTIONS 1-9: 6

## 2021-05-27 ENCOUNTER — OFFICE VISIT - HEALTHEAST (OUTPATIENT)
Dept: OCCUPATIONAL THERAPY | Facility: REHABILITATION | Age: 81
End: 2021-05-27

## 2021-05-27 VITALS
SYSTOLIC BLOOD PRESSURE: 150 MMHG | TEMPERATURE: 99.1 F | OXYGEN SATURATION: 98 % | HEART RATE: 88 BPM | DIASTOLIC BLOOD PRESSURE: 70 MMHG

## 2021-05-27 VITALS
DIASTOLIC BLOOD PRESSURE: 77 MMHG | SYSTOLIC BLOOD PRESSURE: 166 MMHG | TEMPERATURE: 99 F | OXYGEN SATURATION: 97 % | HEART RATE: 77 BPM

## 2021-05-27 DIAGNOSIS — R29.898 RIGHT HAND WEAKNESS: ICD-10-CM

## 2021-05-27 DIAGNOSIS — Z78.9 DECREASED ACTIVITIES OF DAILY LIVING (ADL): ICD-10-CM

## 2021-05-27 DIAGNOSIS — M79.644 PAIN IN FINGER OF RIGHT HAND: ICD-10-CM

## 2021-05-27 ASSESSMENT — PATIENT HEALTH QUESTIONNAIRE - PHQ9
SUM OF ALL RESPONSES TO PHQ QUESTIONS 1-9: 17
SUM OF ALL RESPONSES TO PHQ QUESTIONS 1-9: 7

## 2021-05-27 NOTE — TELEPHONE ENCOUNTER
ANTICOAGULATION  MANAGEMENT- Home Care/Care Facility Result    Assessment     Today's INR result of 2.50 is Therapeutic (goal INR of 2.0-3.0)        Warfarin taken as previously instructed.    Patient re-started Warfarin on 4/1/19, she has only had 4 doses thus far and is already therapeutic. Reducing Warfarin dose today.    No new diet changes affecting INR    No new medication/supplements affecting INR    Continues to tolerate warfarin with no reported s/s of bleeding or thromboembolism     Patient recently re-started Warfarin, no previous INR      Plan:     Spoke with NOAH Waller with [facility] discussed INR result and instructed:     Warfarin Dosing Instructions: Change warfarin dose to 7.5 mg daily on TUE / THUR / SAT; and 5 mg daily rest of week  (22.7 % change)    Next INR to be drawn: 4/9/19    Education provided: importance of therapeutic range and target INR goal and significance of current INR result    NOAH Waller verbalizes understanding and agrees to warfarin dosing plan.   ?   Caitlyn Posey RN    Subjective/Objective:      Sandy Spencer, a 78 y.o. female is established on warfarin.     Home care/care facility RN's report of Sandy INR, recent warfarin dosing, diet changes, medication changes, and symptoms is documented below.    Additional findings: none    Anticoagulation Episode Summary     Current INR goal:   2.0-3.0   TTR:   --   Next INR check:   4/9/2019   INR from last check:   2.50 (4/5/2019)   Weekly max warfarin dose:      Target end date:      INR check location:      Preferred lab:      Send INR reminders to:   ANTICOAGULATION POOL C (DTN,VAD,CGR,GAV)    Indications    Other chronic pulmonary embolism without acute cor pulmonale (H) [I27.82]           Comments:            Anticoagulation Care Providers     Provider Role Specialty Phone number    Caitlyn Rees MD Referring Family Medicine [phone]

## 2021-05-27 NOTE — TELEPHONE ENCOUNTER
[facility] notified of this. They will wait for the pain clinc to respond. Shaan, CMA

## 2021-05-27 NOTE — TELEPHONE ENCOUNTER
"  Call from patient     She wanted to report to the team she had had some diarrhea following recent appt that lasted about 4 days.   - she has since resolved though       She primarily wanted to get message to Dr Rees and Dr Floyd re: \"staying on top of my INR, kidney function and Hgb\"       She would like to know about next steps tracking these measures - she would like to be able to schedule surgery to remove the mesh filter she has as soon as possible         A/P:   > I will message team to let them know and to have them follow up with you as needed         Jean Jones, RN   Triage and Medication Refills      Reason for Disposition    MILD-MODERATE diarrhea (e.g., 1-6 times / day more than normal)     Has recovered    Protocols used: DIARRHEA-A-OH      "

## 2021-05-27 NOTE — TELEPHONE ENCOUNTER
Received fax from [facility] [employer] [facility] on patient      Placed in Dr. Rees's inbox      04/08/19

## 2021-05-27 NOTE — TELEPHONE ENCOUNTER
INR result is 2.9  INR   Date Value Ref Range Status   04/09/2019 3.00 (!) 0.9 - 1.1 Final       Will the patient be seen, or did they already see, MD or CNP today? No    Most Recent Warfarin dose day/week  Sunday Monday Tuesday Wednesday Thursday Friday Saturday        7.5 7.5     Sunday Monday Tuesday Wednesday Thursday Friday Saturday   5 5 7.5 5 7.5         Has the patient missed any doses of Coumadin, Warfarin, Jantoven in the past 7 days? No    Has the patients medications changed since the last visit? No    Has the patient experienced any bleeding recently? No    Has the patient experienced any injuries or illness recently? No    Has the patient experienced any 'new' shortness of breath, severe headaches, or changes in vision recently? No    Has the patient had any changes in their diet, or alcohol consumption? No    Is the patient here today to prepare for any type of upcoming surgery, procedure, or for a cardioversion procedure? No    What phone number can we reach the patient at today? [phone].

## 2021-05-27 NOTE — TELEPHONE ENCOUNTER
INR result is 3.7  INR   Date Value Ref Range Status   04/12/2019 2.90 (!) 0.9 - 1.1 Final       Will the patient be seen, or did they already see, MD or CNP today? No    Most Recent Warfarin dose day/week  Sunday Monday Tuesday Wednesday Thursday Friday Saturday      5 7.5 5 7.5     Sunday Monday Tuesday Wednesday Thursday Friday Saturday   5 5 7.5           Has the patient missed any doses of Coumadin, Warfarin, Jantoven in the past 7 days? No    Has the patients medications changed since the last visit? No    Has the patient experienced any bleeding recently? No    Has the patient experienced any injuries or illness recently? Yes severe diarrhea Friday 4-12-19 through yesterday 4-16-19    Has the patient experienced any 'new' shortness of breath, severe headaches, or changes in vision recently? No    Has the patient had any changes in their diet, or alcohol consumption? Yes ate fruit and not a lot of vegetables since onset of diarrhea on 4-12-19    Is the patient here today to prepare for any type of upcoming surgery, procedure, or for a cardioversion procedure? No    What phone number can we reach the patient at today? [phone].

## 2021-05-27 NOTE — PROGRESS NOTES
Small amount of bright red blood noted on toilet tissue when patient wiped.  Urine collected. No blood visibly noted in toilet or in urine specimen. Urine sent to lab.  Patient instructed to monitor bleeding and if worsens to call. Pad provided.  She was tearful and anxious about the blood on tissue but verbalized understanding. Dr Everett perdomo/APARNA Martinez RN

## 2021-05-27 NOTE — TELEPHONE ENCOUNTER
Refill Request  Did you contact pharmacy: No  Medication name: morphine (MSIR) 15 MG tablet  diphenoxylate-atropine (LOMOTIL) 2.5-0.025 mg per tablet  Who prescribed the medication: Caitlyn Rees MD  Pharmacy Name and Location: [facility] [address] [place] [address] [address] [place] [facility] [place] [facility] [address]  Is patient out of medication: No.  1 days left  Patient notified refills processed in 72 hours:  yes  Okay to leave a detailed message: yes

## 2021-05-27 NOTE — TELEPHONE ENCOUNTER
I don't see that she has an appointment to have her labs checked again, maybe in the next week she can schedule a lab only appointment for hemoglobin and kidney function.     Orders are in there.

## 2021-05-27 NOTE — TELEPHONE ENCOUNTER
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 and is recovering  Drinking fluids  Dr Rees called her last night  mohan filter in her neck and pains in her legs and under left breast that comes and goes   Recall on the mohan filter and trying to get scheduled to get it out   Working with hematology and oncology  On warfarin   Recent bp 130-90/60-70  No pain meds for a week  Chest pain this am before 10 am and is a tweak and then goes away and only once today  Yesterday she felt the chest pain 3-4 times for the day  She has it a couple times last night and doesn't even last a minute, more like 10 seconds  Pt had cherrios and rasberries and water    Please advise. Yue Gore, RN [facility] RN Triage      Lab Results   Component Value Date    INR 2.50 (!) 04/05/2019    INR 0.98 03/03/2019    INR 0.95 03/02/2019       BP Readings from Last 3 Encounters:   04/05/19 102/60   03/26/19 131/60   03/19/19 90/52         Reason for Disposition    Systolic BP < 90 and NOT dizzy, lightheaded or weak    Protocols used: LOW BLOOD PRESSURE-A-OH

## 2021-05-27 NOTE — TELEPHONE ENCOUNTER
ANTICOAGULATION  MANAGEMENT- Home Care/Care Facility Result    Assessment     Today's INR result of 2.90 is Therapeutic (goal INR of 2.0-3.0)        Warfarin taken as previously instructed    No new diet changes affecting INR    No new medication/supplements affecting INR    Continues to tolerate warfarin with no reported s/s of bleeding or thromboembolism     Previous INR was Therapeutic    Plan:     Spoke with NOAH Waller with [facility] [place] discussed INR result and instructed:     Warfarin Dosing Instructions: Continue current warfarin dose 7.5 mg daily on TUE / THUR / SAT; and 5 mg daily rest of week  (0 % change)    Next INR to be drawn: 4/17/19    Education provided: target INR goal and significance of current INR result    NOAH Waller verbalizes understanding and agrees to warfarin dosing plan.   ?   Caitlyn Posey RN    Subjective/Objective:      Sandy Spencer, a 78 y.o. female is established on warfarin.     Home care/care facility RN's report of Sandy INR, recent warfarin dosing, diet changes, medication changes, and symptoms is documented below.    Additional findings: none    Anticoagulation Episode Summary     Current INR goal:   2.0-3.0   TTR:   100.0 % (1 wk)   Next INR check:   4/17/2019   INR from last check:   2.90 (4/12/2019)   Weekly max warfarin dose:      Target end date:      INR check location:      Preferred lab:      Send INR reminders to:   ANTICOAGULATION POOL C (DTN,VAD,CGR,GAV)    Indications    Other chronic pulmonary embolism without acute cor pulmonale (H) [I27.82]           Comments:            Anticoagulation Care Providers     Provider Role Specialty Phone number    Caitlyn Rees MD Referring Family Medicine [phone]

## 2021-05-27 NOTE — TELEPHONE ENCOUNTER
ANTICOAGULATION  MANAGEMENT- Home Care/Care Facility Result    Assessment     Today's INR result of 3.7 is Supratherapeutic (goal INR of 2.0-3.0)        Warfarin taken as previously instructed    Diarrhea symptoms since 4/12 - last occurence was last night may be affecting diet and INR    No new medication/supplements affecting INR    Continues to tolerate warfarin with no reported s/s of bleeding or thromboembolism     Previous INR was Therapeutic    Plan:     Spoke with NOAH Waller with [facility] discussed INR result and instructed:     Warfarin Dosing Instructions: hold warfarin dose today then continue current warfarin dose    7.5 mg every Tue, Thu, Sat; 5 mg all other days      (0 % change)    Next INR to be drawn: one week    Education provided: impact of vitamin K foods on INR, importance of therapeutic range, target INR goal and significance of current INR result, monitoring for bleeding signs and symptoms and impact of diarrhea symptoms on INR    Christin verbalizes understanding and agrees to warfarin dosing plan.   ?   Litzy Gore RN    Subjective/Objective:      Sandy Spencer, a 78 y.o. female is established on warfarin.     Home care/care facility RN's report of Sandy INR, recent warfarin dosing, diet changes, medication changes, and symptoms is documented below.    Additional findings: last diarrhea occurence was last night    Anticoagulation Episode Summary     Current INR goal:   2.0-3.0   TTR:   63.5 % (1.7 wk)   Next INR check:   4/24/2019   INR from last check:   3.70! (4/17/2019)   Weekly max warfarin dose:      Target end date:      INR check location:      Preferred lab:      Send INR reminders to:   ANTICOAGULATION POOL C (DTN,VAD,CGR,GAV)    Indications    Other chronic pulmonary embolism without acute cor pulmonale (H) [I27.82]           Comments:            Anticoagulation Care Providers     Provider Role Specialty Phone number    Caitlyn Rees MD  [facility] Family Medicine [phone]

## 2021-05-27 NOTE — TELEPHONE ENCOUNTER
RN Triage:    Spoke with 78 yr old Sandy who reports the following information and symptoms:    R kidney removed 2/20/19.    Taken to [facility] 3/13/19 dx with acute cystitis and given Cefdinir.      Finished AB yesterday, 3/24/19.    No burning for a few days while on the AB, still had some L flank pain but not as severe.    Increased pain in the L flank and burning with urination started again yesterday.    Rates pain 6-7 on a 0-10 pain scale.    Voiding in small amounts.    Drinking plenty of fluids.    Urine darker and more concentrated.    Denies fever.    Would like to start taking the Morphine as she has a few tablets remaining.    PLAN:  Will consult with PCP per protocol for worsening symptoms after finishing Cefdinir yesterday.  Please advise.    Janeth Lacy RN   [facility] RN Triage    Reason for Disposition    [1] Taking antibiotic > 24 hours for UTI AND [2] flank or lower back pain worsening    Protocols used: URINARY TRACT INFECTION ON ANTIBIOTIC FOLLOW-UP CALL - FEMALE-ELSIE-REESE

## 2021-05-27 NOTE — TELEPHONE ENCOUNTER
Called and spoke with NOAH Waller with [facility].   Gave orders to re-start Warfarin medication. Take 10 mg today, and then 7.5 mg daily all other days of the week. Check INR in 4 days on FRI 4/5/19.  Christin verbalized understanding and agreed to plan.    Sent prescription for Warfarin 5 mg tablets to [facility]. Unclear if she had Warfarin tablets on hand.    ACN updated AC calendar.    Caitlyn Posey RN  Anticoagulation Nurse  [phone]

## 2021-05-27 NOTE — TELEPHONE ENCOUNTER
Pt contacted. Follow up appt scheduled. She will give [facility] pain one more try and see what happens. She will call back if an external referral is wanted.H.DeGree, CMA

## 2021-05-27 NOTE — PROGRESS NOTES
Chief Complaint   Patient presents with     Abdominal Pain     Pain on left side of abdomen. Pain on right side, not sure if stent is still in there.     Neck Problem     Right side neck vein is bulging more. not sure which groin it is connected to.      Mass     Lump on right leg.       Pt declines med review today.     Jessa Rojas, CMA

## 2021-05-27 NOTE — TELEPHONE ENCOUNTER
Message to provider from Home Care RN:  Patient's blood pressure is currently 134/70. It was very low earlier today 80/48. She drank lots of water. She is feeling fine. Pulse 80 Resp 18  T99, O2 sat 99# on room air.    Eula Vincent RN [facility] Triage Nurse

## 2021-05-27 NOTE — TELEPHONE ENCOUNTER
Pt is calling in with concerns about blood clots. Pt had an IV filter put in her neck on the right side, and is concerned about getting it out. Friends are telling her her neck is bulging on the right side. Pt reports pain under her left breast is coming more often. Pt would like to know what she should do. Pt says her Hematologist wants her filter removed ASAP. Pt also said she needs to know what her lab results are from Friday, and would like to talk to a doctor about whether there are concerns. Pt wants to know if she should see the Hematologist again. Pt would like to speak with her PCP. [phone]    Ranjith Prieto RN [facility] Triage/Medication Refill

## 2021-05-27 NOTE — TELEPHONE ENCOUNTER
RN cannot approve Refill Request    RN can NOT refill this medication med is not covered by policy/route to provider.     Perla Reyna, [facility] Triage/Med Refill 4/2/2019    Requested Prescriptions   Pending Prescriptions Disp Refills     warfarin (COUMADIN/JANTOVEN) 5 MG tablet [Pharmacy Med Name: WARFARIN SOD 5MG TABLETS (PEACH)] 180 tablet 2     Sig: TAKE ONE TO TWO TABLETS(5-10MG) BY MOUTH DAILY ADJUST DOSE BASED ON INR RESULTS    Warfarin Refill Protocol  Failed - 4/1/2019  5:24 PM       Failed -  Route to appropriate pool/provider    Last Anticoagulation Summary:   Anticoagulation Episode Summary     Current INR goal:   2.0-3.0   TTR:   --   Next INR check:   4/5/2019   INR from last check:   No new INR was available at last check   Weekly max warfarin dose:      Target end date:      INR check location:      Preferred lab:      Send INR reminders to:   ANTICOAGULATION POOL C (DTN,VAD,CGR,GAV)    Indications    Other chronic pulmonary embolism without acute cor pulmonale (H) [I27.82]           Comments:            Anticoagulation Care Providers     Provider Role Specialty Phone number    Caitlyn Rees MD Referring Family Medicine [phone]               Passed - Provider visit in last year    Last office visit with prescriber/PCP: 3/19/2019 Caitlyn Rees MD OR same dept: 3/19/2019 Caitlyn Rees MD OR same specialty: 3/19/2019 Caitlyn Rees MD  Last physical: 12/4/2018 Last MTM visit: Visit date not found    Next appt within 3 mo: Visit date not found Next physical within 3 mo: Visit date not found  Prescriber OR PCP: Caitlyn Rees MD  Last diagnosis associated with med order: 1. Other chronic pulmonary embolism without acute cor pulmonale (H)  - warfarin (COUMADIN/JANTOVEN) 5 MG tablet [Pharmacy Med Name: WARFARIN SOD 5MG TABLETS (PEACH)]; TAKE ONE TO TWO TABLETS(5-10MG) BY MOUTH DAILY ADJUST DOSE BASED ON INR RESULTS  Dispense:  180 tablet; Refill: 2    2. Splenic infarction  - warfarin (COUMADIN/JANTOVEN) 5 MG tablet [Pharmacy Med Name: WARFARIN SOD 5MG TABLETS (PEACH)]; TAKE ONE TO TWO TABLETS(5-10MG) BY MOUTH DAILY ADJUST DOSE BASED ON INR RESULTS  Dispense: 180 tablet; Refill: 2    3. History of blood clots  - warfarin (COUMADIN/JANTOVEN) 5 MG tablet [Pharmacy Med Name: WARFARIN SOD 5MG TABLETS (PEACH)]; TAKE ONE TO TWO TABLETS(5-10MG) BY MOUTH DAILY ADJUST DOSE BASED ON INR RESULTS  Dispense: 180 tablet; Refill: 2    If protocol passes may refill for 6 months if within 3 months of last provider visit (or a total of 9 months).

## 2021-05-27 NOTE — TELEPHONE ENCOUNTER
INR result is 2.5  INR   Date Value Ref Range Status   03/03/2019 0.98 0.90 - 1.10 Final       Will the patient be seen, or did they already see, MD or CNP today? Yes PCP    Most Recent Warfarin dose day/week  Sunday Monday Tuesday Wednesday Thursday Friday Saturday    10 7.5 7.5 7.5       Sunday Monday Tuesday Wednesday Thursday Friday Saturday                Has the patient missed any doses of Coumadin, Warfarin, Jantoven in the past 7 days? No    Has the patients medications changed since the last visit? No    Has the patient experienced any bleeding recently? No    Has the patient experienced any injuries or illness recently? No    Has the patient experienced any 'new' shortness of breath, severe headaches, or changes in vision recently? No    Has the patient had any changes in their diet, or alcohol consumption? No    Is the patient here today to prepare for any type of upcoming surgery, procedure, or for a cardioversion procedure? Yes possible surgery in 4 weeks    What phone number can we reach the patient at today? home phone listed in demographics.

## 2021-05-27 NOTE — TELEPHONE ENCOUNTER
INR result is 3.0  INR   Date Value Ref Range Status   04/05/2019 2.50 (!) 0.9 - 1.1 Final       Will the patient be seen, or did they already see, MD or CNP today? No    Most Recent Warfarin dose day/week  Sunday Monday Tuesday Wednesday Thursday Friday Saturday     7.5 mg 7.5 mg 7.5 mg 7.5 mg 7.5 mg     Sunday Monday Tuesday Wednesday Thursday Friday Saturday   5 mg 5 mg            Has the patient missed any doses of Coumadin, Warfarin, Jantoven in the past 7 days? No    Has the patients medications changed since the last visit? No    Has the patient experienced any bleeding recently? No    Has the patient experienced any injuries or illness recently? No    Has the patient experienced any 'new' shortness of breath, severe headaches, or changes in vision recently? No    Has the patient had any changes in their diet, or alcohol consumption? No    Is the patient here today to prepare for any type of upcoming surgery, procedure, or for a cardioversion procedure? No    What phone number can we reach the patient at today? [phone] Carole.

## 2021-05-27 NOTE — TELEPHONE ENCOUNTER
Sent new RX to [facility] for a 90-day supply of Warfarin tablets.    Caitlyn Posey RN  Anticoagulation Nurse  [phone]

## 2021-05-27 NOTE — TELEPHONE ENCOUNTER
----- Message from Magali Martinez RN sent at 4/1/2019  1:40 PM CDT -----  Patient was given ok to start on warfarin by Dr. Johnson, Urologist.     Dr. Floyd would like patient's primary, Dr. Rees, to manage INRs.  Can you assist with getting patient set up?  I sent message to Dr. Rees on Friday notifying her of this but haven't heard back yet.    Thanks,    [facility]   [phone]

## 2021-05-27 NOTE — TELEPHONE ENCOUNTER
Per documentation home health requesting that Pain Clinic resume prescribing of Morphine.   Sandy has appointment scheduled with Dr Lynn on 4/19. Last appointment with Dr Lynn 1/11/19.    Contacted patient to let her know she could get a bridge script to her next appointment.      Bridge script cued to next appointment.     from previous note shows Morphine last filled on 3/3/19.    Routed to Dr Lynn

## 2021-05-27 NOTE — TELEPHONE ENCOUNTER
Ready to Fax    Eucrisa Counseling: Patient may experience a mild burning sensation during topical application. Eucrisa is not approved in children less than 2 years of age.

## 2021-05-27 NOTE — TELEPHONE ENCOUNTER
If she is feeling off she should go to the ER.     If she is otherwise feeling ok with the lower blood pressure then she should continue to drink fluids, eat as able and not take any furosemide which is what I show as her only blood pressure medication    I cannot see her today but I'm sure we have openings here today and could get her in with someone else. Sloan could see her this afternoon to recheck blood pressure, look at the neck bulge and talk more about the chest pains.

## 2021-05-27 NOTE — TELEPHONE ENCOUNTER
Discussed with patient that her filter is in her lower vena cava, in the groin. If there is a bulge in the neck it could be an aneurysm from insertion, if pulsing, getting bigger should go to the ER.    In regards to the left chest pains, similar to the PE pain she had before, will watch overnight and then we'll follow up with her tomorrow.     If not getting better we'll see her Wed or Friday. In the meantime if worsening she will go to the ER.

## 2021-05-27 NOTE — TELEPHONE ENCOUNTER
Patient is in clinic today to see Dr. Rees for a two week follow up. Patient became upset that she is without her Morphine 15 mg prescribed by Dr. Lynn. Looking into the script, Morphine 15 mg was filled on 4/3/19. Method was print though. Patient is needing this to be sent to [facility] as she is unable to drive [facility]  the script.     Patient has been out of the Morphine since Tuesday evening 4/2/19.

## 2021-05-27 NOTE — TELEPHONE ENCOUNTER
Lomotil sent in. Morphine needs to come from the pain clinic. I'm cc'ing the pain clinic on this note, but she may want to call on Tuesday morning to them as well.

## 2021-05-27 NOTE — TELEPHONE ENCOUNTER
Received fax from [employer] [facility] on patient    Placed in Dr. Rees's inbox      04/18/19
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
## 2021-05-27 NOTE — PROGRESS NOTES
Chief Complaint   Patient presents with     Abdominal Pain     Pain on left side of abdomen. Pain on right side, not sure if stent is still in there.     Neck Problem     Right side neck vein is bulging more. not sure which groin it is connected to.      Mass     Lump on right leg.       HPI: Patient presents today with questions regarding her recent hospitalizations, nephrectomy, medications, and IVC filter.  Her medical history is quite complicated and extensive and was reviewed in detail.    First, patient wants to know whether or not she still has a stent twice in the right ureter before and eventual nephroureterectomy.  There is significant blood loss through this hospitalization and she did receive multiple transfusions.  She is continued to be anemic postoperatively and has been getting her hemoglobin checked frequently. I informed her that this ureter was removed via surgery.    The patient's neighbor also was concerned because she thought that there was a bulging coming out of the patient's right neck.  She did have an IVC filter placed via her right jugular and that remains.  She is extremely anxious about this filter and would like to get it removed ASAP.  They are waiting until her INR is therapeutic and the patient's hemoglobin recovers appropriately before removing the filter.  She had extensive thrombus throughout her body necessitating the anticoagulation and filter.  She notes that she does have some left back/flank pain.  This has been present for the last couple of months and remains unchanged.  Nothing makes it better or worse.  She had CT imaging performed which showed no evidence of a source of this pain.  She is still making urine.  Renal function is decreased.    Overall, she is just most anxious about seeing what her hemoglobin and INR today.  No new complaints.  Afebrile.  No chills.  No shortness of breath.  Vital signs stable today.       ROS:Review of Systems - History obtained from the  patient  General ROS: negative  Psychological ROS: positive for - anxiety  Hematological and Lymphatic ROS: negative  Respiratory ROS: negative  Cardiovascular ROS: negative  Gastrointestinal ROS: negative  Genito-Urinary ROS: negative  Neurological ROS: negative  Dermatological ROS: negative    SH: The Patient's  reports that she quit smoking about 19 years ago. She has a 20.00 pack-year smoking history. She has never used smokeless tobacco. She reports that she does not drink alcohol or use drugs.      FH: The Patient's family history includes Aortic aneurysm in her mother; Heart disease in her father and mother; Kidney disease in her father and mother; Stroke in her father.     Meds:    Current Outpatient Medications on File Prior to Visit   Medication Sig Dispense Refill     acetaminophen (TYLENOL) 500 MG tablet Take 650 mg by mouth 3 (three) times a day.              calcium, as carbonate, (TUMS) 200 mg calcium (500 mg) chewable tablet Chew 1 tablet (200 mg total) 3 (three) times a day as needed. 30 tablet 0     cholecalciferol, vitamin D3, 5,000 unit Tab Take 1 tablet by mouth daily with supper.              ciclopirox (PENLAC) 8 % solution Apply over nail and surrounding skin. Apply daily over previous coat. After seven (7) days, may remove with alcohol and continue cycle. (Patient taking differently: Apply topically daily as needed. Apply over nail and surrounding skin. Apply daily over previous coat. After seven (7) days, may remove with alcohol and continue cycle      ) 6.6 mL 1     diclofenac sodium (VOLTAREN) 1 % Gel Apply 4 g topically. (apply to knees Q AM and Q 2pm and prn.  May self-administer)       diphenoxylate-atropine (LOMOTIL) 2.5-0.025 mg per tablet Take 1 tablet by mouth 4 (four) times a day as needed for diarrhea. 30 tablet 1     food supplemt, lactose-reduced (BOOST BREEZE NUTRITIONAL) 0.04-1.05 gram-kcal/mL Liqd Take 237 mL by mouth daily. 30 Bottle 2     furosemide (LASIX) 20 MG tablet  Take by mouth. (every other day PRN for weight gain of 3# in 24 hours or SBP >180)       GENERLAC 10 gram/15 mL solution TK 30ML PO QD (Patient taking differently: Take 30 mL by mouth daily as needed. TK 30ML PO QD      ) 236 mL 3     hydrOXYzine HCl (ATARAX) 10 MG tablet Take 1 tablet (10 mg total) by mouth 3 (three) times a day. 90 tablet 1     Lactobacillus acidoph-L.bulgar (FLORANEX) 1 million cell Tab tablet Take 1 tablet by mouth daily. 30 tablet 0     levothyroxine (LEVO-T) 50 MCG tablet Take 1 tablet (50 mcg total) by mouth Daily at 6:00 am. 30 tablet 3     loperamide (IMODIUM) 2 mg capsule Take 2 mg by mouth 4 (four) times a day as needed for diarrhea .        1     morphine (MSIR) 15 MG tablet Take 0.5 tablets (7.5 mg total) by mouth every 6 (six) hours as needed. 32 tablet 0     morphine (MSIR) 15 MG tablet One-half tab four times a day 28 tablet 0     naloxone (NARCAN) 4 mg/actuation nasal spray 1 spray (4 mg dose) into one nostril for opioid reversal. Call 911. May repeat if no response in 3 minutes. 1 Box 0     omeprazole (PRILOSEC) 40 MG capsule TAKE 1 CAPSULE(40 MG) BY MOUTH DAILY BEFORE BREAKFAST 90 capsule 0     promethazine (PHENERGAN) 12.5 MG tablet Take 1 tablet (12.5 mg total) by mouth every 6 (six) hours as needed for nausea. 20 tablet 1     rosuvastatin (CRESTOR) 40 MG tablet Take 1 tablet (40 mg total) by mouth daily. 30 tablet 3     senna-docusate (PERICOLACE) 8.6-50 mg tablet Take 2 tablets by mouth daily as needed. 30 tablet 0     SUMAtriptan (IMITREX) 50 MG tablet Take 1 tablet (50 mg total) by mouth every 2 (two) hours as needed for migraine. 27 tablet 1     topiramate (TOPAMAX) 50 MG tablet Take 0.5-1 tablets (25-50 mg total) by mouth 2 (two) times a day. 20 tablet 0     traZODone (DESYREL) 100 MG tablet TAKE 2 TO 4 TABLETS(200  MG) BY MOUTH AT BEDTIME AS NEEDED FOR SLEEP 360 tablet 0     triamcinolone (KENALOG) 0.1 % ointment Apply small amount to affected 2-3 times daily as  "needed 60 g 1     warfarin (COUMADIN/JANTOVEN) 5 MG tablet Take one to two tablets (5 to 10 mg) by mouth daily. Adjust dose based on INR results as directed. 180 tablet 1     Current Facility-Administered Medications on File Prior to Visit   Medication Dose Route Frequency Provider Last Rate Last Dose     denosumab 60 mg (PROLIA 60 mg/ml)  60 mg Subcutaneous Q6 Months Andrei Olivera MD   60 mg at 04/05/19 1318       O:  /60   Pulse 87   Temp 97.8  F (36.6  C) (Oral)   Ht 5' 2.25\" (1.581 m)   Wt 126 lb (57.2 kg)   SpO2 96%   BMI 22.86 kg/m      Physical Examination:   General appearance - alert, well appearing, and in no distress  Mental status - alert, oriented to person, place, and time  Eyes - pupils equal and reactive, extraocular eye movements intact  Ears - bilateral TM's and external ear canals normal  Nose - normal and patent, no erythema, discharge or polyps  Mouth - mucous membranes moist, pharynx normal without lesions  Neck - supple, no significant adenopathy.  No bulging noted.  No pulsating either.  Carotids palpable bilaterally.  No bruit.  Lymphatics - no palpable lymphadenopathy, no hepatosplenomegaly  Chest - clear to auscultation, no wheezes, rales or rhonchi, symmetric air entry  Heart - normal rate and regular rhythm, S1 and S2 normal, no murmurs noted  Abdomen - soft, nontender, nondistended, no masses or organomegaly  Neurological - alert, oriented, normal speech, no focal findings or movement disorder noted, neck supple without rigidity, cranial nerves II through XII intact, motor and sensory grossly normal bilaterally, normal muscle tone, no tremors, strength 5/5  Musculoskeletal - no joint tenderness, deformity or swelling  Extremities - peripheral pulses normal, no pedal edema, no clubbing or cyanosis  Skin - normal coloration and turgor, no rashes, no suspicious skin lesions noted      A/P:     Problem List Items Addressed This Visit        ENT/CARD/PULM/ENDO Problems    " Other acute pulmonary embolism without acute cor pulmonale (H)       Other    Chronic kidney disease (CKD) stage G3a/A1, moderately decreased glomerular filtration rate (GFR) between 45-59 mL/min/1.73 square meter and albuminuria creatinine ratio less than 30 mg/g (H)    Anemia due to blood loss, acute    Anxiety disorder due to medical condition    History of blood clots - Primary      Other Visit Diagnoses     Anemia, unspecified type                1. Anemia, unspecified type  Planned recheck of HGB through Dr. Floyd next wednesday    2. History of blood clots  INR today 3.00    3. Chronic kidney disease (CKD) stage G3a/A1, moderately decreased glomerular filtration rate (GFR) between 45-59 mL/min/1.73 square meter and albuminuria creatinine ratio less than 30 mg/g (H)  Following with urology re: CKD currently.    4. Anxiety disorder due to medical condition  Stable. Baseline.    5. Anemia due to blood loss, acute  - Hemoglobin    6. Other acute pulmonary embolism without acute cor pulmonale (H)  No evidence of worsening thrombus, and given her therapeutic INR, this is less likely.      Total time spent with patient was at least 40 minutes, all of which was spent in counseling and coordination of care regarding their current medical problems.    Sloan Hussein, CNP

## 2021-05-27 NOTE — TELEPHONE ENCOUNTER
Left detailed message for the patient with Dr. Rees's recommendation of WIC today/tonight or schedule with another provider for tomorrow. Maybe Sloan TINEO, patient has seen him in the past.

## 2021-05-27 NOTE — TELEPHONE ENCOUNTER
Orders being requested: Follow up appointment with dietician.   Reason service is needed/diagnosis: Per patient's request  When are orders needed by: ASAOSVALDO  Where to send Orders: Phone:  [phone]  Okay to leave detailed message?  Yes

## 2021-05-27 NOTE — TELEPHONE ENCOUNTER
Given her history she does need to be seen. I'm unable to see her today with my schedule, but maybe walk in clinic, mainly to leave a urine sample and to have someone look at her. I don't see any obvious availability here today but tomorrow I'm sure we could get her in with someone.

## 2021-05-27 NOTE — PROGRESS NOTES
Patient here today for initial consultation with Dr. Floyd for benign hematology/APARNA Martinez RN

## 2021-05-27 NOTE — TELEPHONE ENCOUNTER
Message placed on the patient's next appointment with Dr. Lynn to ask about scheduling this procedure.

## 2021-05-27 NOTE — TELEPHONE ENCOUNTER
ANTICOAGULATION  MANAGEMENT- Home Care/Care Facility Result    Assessment     Today's INR result of 3.0 is Therapeutic (goal INR of 2.0-3.0)        Warfarin taken as previously instructed took 7.5 mg on Friday instead of 5 mg    No new diet changes affecting INR    No new medication/supplements affecting INR    Continues to tolerate warfarin with no reported s/s of bleeding or thromboembolism     Previous INR was Therapeutic    Plan:     Spoke with Carmella discussed INR result and instructed:     Warfarin Dosing Instructions: 7.5 mg tonight, 5 mg tomorrow 7.5 mg on thur    Next INR to be drawn: fri 4/12 with home care visit      Christin verbalizes understanding and agrees to warfarin dosing plan.   ?   Franchesca Holman RN    Subjective/Objective:      Sandy Spencer, a 78 y.o. female is established on warfarin.     Home care/care facility RN's report of Sandy INR, recent warfarin dosing, diet changes, medication changes, and symptoms is documented below.    Additional findings: none    Anticoagulation Episode Summary     Current INR goal:   2.0-3.0   TTR:   100.0 % (4 d)   Next INR check:   4/12/2019   INR from last check:   3.00 (4/9/2019)   Weekly max warfarin dose:      Target end date:      INR check location:      Preferred lab:      Send INR reminders to:   ANTICOAGULATION POOL C (DTN,VAD,CGR,GAV)    Indications    Other chronic pulmonary embolism without acute cor pulmonale (H) [I27.82]           Comments:            Anticoagulation Care Providers     Provider Role Specialty Phone number    Caitlyn Rees MD Referring Family Medicine [phone]

## 2021-05-27 NOTE — TELEPHONE ENCOUNTER
"Occupational Therapist is with [facility] now. They are on speaker phone. Informed patient of information below. She feels fine. She has a nurse from [facility] speak coming today at 3pm. She would like to complete this visit in her home instead of coming into the clinic. She will have that nurse call us during her visit and give a report. Pt notified that she should go to the ER if she starts feeling \"off\". She is understanding of this. H.DeGree, CMA    Dr Rees notified of pt's plan. She is okay with this. Will await their call back this afternoon.   "

## 2021-05-27 NOTE — PROGRESS NOTES
ASSESSMENT/PLAN:       1. Anemia due to blood loss, acute  -We will update her hemoglobin today given her acute blood loss anemia with her bleeding while she was in the hospital.  I am unsure of the goals I cannot tell from her hematology referral appointment how they want the numbers to look prior to having the IVC filter removed.  But certainly will follow up and make sure that this is improving.  - Hemoglobin    2. Chronic kidney disease, stage III (moderate) (H)   -Did have significant worsening of her creatinine while she was in the hospital, updating her levels today to ensure that it is continuing to resolve.  - Comprehensive Metabolic Panel    3. Chronic pain syndrome  -The patient has long-standing history of chronic pain and is on opioids for this.  Given her immobility, she has had worsening of her pain and does have an appointment with the pain clinic in the next few weeks.  Unfortunately she has had some poor experiences with staff on phone calls feeling that they have inappropriately accused her of being rude on the phone or misusing her medications which she has not done.  If she would be interested in hearing about other pain clinics there are however is limited by mobility.    4. Dermatitis  -Improving, she has gone to a dermatologist which is helping.    5. Weight loss  -Unfortunately she continues to have worsening weight loss, and continues to have a poor appetite.  This is likely multifactorial related to medications as well as mobility.  Continue to monitor for stability.    6. Urinary frequency  -Has had some urinary frequency recently, updating her labs today and treat accordingly.  - Urinalysis Macroscopic  - Culture, Urine      Return in about 1 month (around 5/5/2019) for recheck.    30 minutes was spent face-to-face with the patient during this encounter and >50% of this was spent on counseling and coordination of care. We discussed in depth the topics listed above.       Caitlyn  Christine Rees MD      PROGRESS NOTE   4/5/2019    SUBJECTIVE:  Sandy Spencer is a 78 y.o. female  who presents for follow up.     She has been seen by oncology for her blood clot. It was recommended that she restart her warfarin.  She has now been on this and her INR is already therapeutic which she is excited about.  She does want to have her hemoglobin retested today as well as once her hemoglobin demonstrates stability while being anticoagulated she can have the filter removed out of her neck which she would love to do.      She did have someone yell at her on the phone when she was calling for her morphine fill at the pain clinic. She has never violated one of their rules. She does wonder if there is another clinic that she could go to where she would be treated more respectfully.  She is frustrated by this and has felt that people have been very return the phone several times.    She does worry about the Topamax and if there is an alternate for this. She does have about 9 more months left of this she thinks. She was on verapamil in the past. She did have 25 mg and 50 mg pills. She does have both at home. She put it in the same bottle nd went from there. This was discontinued due to slow heart rates.     She has had an increase in the blood pressure with increased water intake.  She continues to have weight loss however, and had significant weight loss today with a home care nurse visiting.  She is eating but not very much, she is sure some of this is related to immobility.  Chief Complaint   Patient presents with     Follow Up     Patient is here today for a two week follow up. Wonders about having her Hgb checked today.      Medication Management     Patient would like to discuss her current medications. Discuss Topiramate, it will cost a total of $1,100 for a 90 day supply.          Patient Active Problem List   Diagnosis     Chronic kidney disease (CKD) stage G3a/A1, moderately decreased glomerular  filtration rate (GFR) between 45-59 mL/min/1.73 square meter and albuminuria creatinine ratio less than 30 mg/g (H)     Focal glomerular sclerosis     Anxiety and depression     RSD lower limb, bilateral     ADD (attention deficit disorder)     RSD upper limb, right     Other specified hypothyroidism     Osteopenia     Major depression     Hypertension     Insomnia     Mixed hyperlipidemia     Right rotator cuff tear     Cluster headaches     Lumbar radiculopathy     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     Reflex sympathetic dystrophy     Acute encephalopathy     Temporomandibular joint-pain-dysfunction syndrome (TMJ)     Pancreatitis     Localized swelling of lower leg     Medical cannabis use     Acute right-sided low back pain without sciatica     Acquired hypothyroidism     Chronic pain syndrome     Non morbid obesity due to excess calories     Snoring     Inadequate pain control     Diarrhea     Abdominal pain, generalized     Dermatochalasis of left eyelid     Bilateral carotid artery stenosis     Coronary artery disease involving native coronary artery of native heart without angina pectoris     Sinus bradycardia     Other chronic pulmonary embolism without acute cor pulmonale (H)     Splenic infarction     Opioid type dependence, continuous (H)     Hemoptysis     Hematuria     Anemia due to blood loss, acute     Dyslipidemia     Hypocalcemia     Hyponatremia     Other acute pulmonary embolism without acute cor pulmonale (H)     Hydronephrosis     Bladder spasms     Hypotension, unspecified hypotension type     Acute post-operative pain     Acute reaction to stress     Mood disorder due to medical condition     Anxiety disorder due to medical condition     Family relationship problem     History of posttraumatic stress disorder (PTSD)     Generalized muscle weakness     History of blood clots       Current Outpatient Medications   Medication Sig Dispense Refill     acetaminophen (TYLENOL) 500 MG  tablet Take 650 mg by mouth 3 (three) times a day.              calcium, as carbonate, (TUMS) 200 mg calcium (500 mg) chewable tablet Chew 1 tablet (200 mg total) 3 (three) times a day as needed. 30 tablet 0     cholecalciferol, vitamin D3, 5,000 unit Tab Take 1 tablet by mouth daily with supper.              ciclopirox (PENLAC) 8 % solution Apply over nail and surrounding skin. Apply daily over previous coat. After seven (7) days, may remove with alcohol and continue cycle. (Patient taking differently: Apply topically daily as needed. Apply over nail and surrounding skin. Apply daily over previous coat. After seven (7) days, may remove with alcohol and continue cycle      ) 6.6 mL 1     diclofenac sodium (VOLTAREN) 1 % Gel Apply 4 g topically. (apply to knees Q AM and Q 2pm and prn.  May self-administer)       diphenoxylate-atropine (LOMOTIL) 2.5-0.025 mg per tablet Take 1 tablet by mouth 4 (four) times a day as needed for diarrhea. 30 tablet 1     food supplemt, lactose-reduced (BOOST BREEZE NUTRITIONAL) 0.04-1.05 gram-kcal/mL Liqd Take 237 mL by mouth daily. 30 Bottle 2     furosemide (LASIX) 20 MG tablet Take by mouth. (every other day PRN for weight gain of 3# in 24 hours or SBP >180)       GENERLAC 10 gram/15 mL solution TK 30ML PO QD (Patient taking differently: Take 30 mL by mouth daily as needed. TK 30ML PO QD      ) 236 mL 3     hydrOXYzine HCl (ATARAX) 10 MG tablet Take 1 tablet (10 mg total) by mouth 3 (three) times a day. 90 tablet 1     Lactobacillus acidoph-L.bulgar (FLORANEX) 1 million cell Tab tablet Take 1 tablet by mouth daily. 30 tablet 0     levothyroxine (LEVO-T) 50 MCG tablet Take 1 tablet (50 mcg total) by mouth Daily at 6:00 am. 30 tablet 3     loperamide (IMODIUM) 2 mg capsule Take 2 mg by mouth 4 (four) times a day as needed for diarrhea .        1     morphine (MSIR) 15 MG tablet Take 0.5 tablets (7.5 mg total) by mouth every 6 (six) hours as needed. 32 tablet 0     naloxone (NARCAN) 4  mg/actuation nasal spray 1 spray (4 mg dose) into one nostril for opioid reversal. Call 911. May repeat if no response in 3 minutes. 1 Box 0     omeprazole (PRILOSEC) 40 MG capsule TAKE 1 CAPSULE(40 MG) BY MOUTH DAILY BEFORE BREAKFAST 90 capsule 0     promethazine (PHENERGAN) 12.5 MG tablet Take 1 tablet (12.5 mg total) by mouth every 6 (six) hours as needed for nausea. 20 tablet 1     rosuvastatin (CRESTOR) 40 MG tablet Take 1 tablet (40 mg total) by mouth daily. 30 tablet 3     senna-docusate (PERICOLACE) 8.6-50 mg tablet Take 2 tablets by mouth daily as needed. 30 tablet 0     SUMAtriptan (IMITREX) 50 MG tablet Take 1 tablet (50 mg total) by mouth every 2 (two) hours as needed for migraine. 27 tablet 1     topiramate (TOPAMAX) 50 MG tablet Take 0.5-1 tablets (25-50 mg total) by mouth 2 (two) times a day. 20 tablet 0     traZODone (DESYREL) 100 MG tablet TAKE 2 TO 4 TABLETS(200  MG) BY MOUTH AT BEDTIME AS NEEDED FOR SLEEP 360 tablet 0     triamcinolone (KENALOG) 0.1 % ointment Apply small amount to affected 2-3 times daily as needed 60 g 1     warfarin (COUMADIN/JANTOVEN) 5 MG tablet Take one to two tablets (5 to 10 mg) by mouth daily. Adjust dose based on INR results as directed. 180 tablet 1     morphine (MSIR) 15 MG tablet One-half tab four times a day 28 tablet 0     Current Facility-Administered Medications   Medication Dose Route Frequency Provider Last Rate Last Dose     denosumab 60 mg (PROLIA 60 mg/ml)  60 mg Subcutaneous Q6 Months Andrei Olivera MD   60 mg at 19 1318       Social History     Tobacco Use   Smoking Status Former Smoker     Packs/day: 1.00     Years: 20.00     Pack years: 20.00     Last attempt to quit: 2000     Years since quittin.2   Smokeless Tobacco Never Used           OBJECTIVE:        Recent Results (from the past 240 hour(s))   INR   Result Value Ref Range    INR 2.50 (!) 0.9 - 1.1   Hemoglobin   Result Value Ref Range    Hemoglobin 9.6 (L) 12.0 - 16.0  g/dL   Urinalysis Macroscopic   Result Value Ref Range    Color, UA Yellow Colorless, Yellow, Straw, Light Yellow    Clarity, UA Clear Clear    Glucose, UA Negative Negative    Bilirubin, UA Small (!) Negative    Ketones, UA Negative Negative    Specific Gravity, UA >=1.030 1.005 - 1.030    Blood, UA Moderate (!) Negative    pH, UA 5.5 5.0 - 8.0    Protein,  mg/dL (!) Negative mg/dL    Urobilinogen, UA 0.2 E.U./dL 0.2 E.U./dL, 1.0 E.U./dL    Nitrite, UA Negative Negative    Leukocytes, UA Trace (!) Negative       Vitals:    04/05/19 1415   BP: 102/60   Patient Site: Right Arm   Patient Position: Sitting   Cuff Size: Adult Regular   Pulse: 90   SpO2: 98%   Weight: 124 lb 14.4 oz (56.7 kg)     Weight: 124 lb 14.4 oz (56.7 kg)          Physical Exam:  GENERAL APPEARANCE: A&A, NAD, well hydrated, well nourished, thin and frail appearing  SKIN:  Normal skin turgor, no lesions/rashes   HEENT: moist mucous membranes, no rhinorrhea  NECK: Normal  CV: RRR, no M/G/R   LUNGS: CTAB  EXTREMITY: no edema   NEURO: no gross deficits

## 2021-05-27 NOTE — TELEPHONE ENCOUNTER
"Dr. Floyd wanting clearance from Dr. Johnson, Urologist for patient to start taking warfarin.  Called Dr. Johnson's Office ([facility]-[phone]) and spoke with his \"assistant\", John.  Dr. Johnson is at [facility] today. He will send him a message and get back to me later today or tomorrow/APARNA Martinez RN    Per Dr. Floyd, once patient is cleared to take warfarin, patient to have managed by PCP and keep INR between 2-3/APARNA Martinez RN    3/29/19 1440:  Called Dr. Johnson's Office and spoke with John.  He said Dr. Johnson said it was ok for patient to start Coumadin. Dr. Floyd updated/APARNA Martinez RN    3/29/19 1449:  Message sent to Dr. Rees to manage warfarin and weekly Hgb/APARNA Martinez RN    4/1/19 1340:  Message sent to Anticoag Pool/APARNA Martinez RN     4/2/19:  Antico Clinic to manage warfarin/INRs.  Patient said she started warfarin yesterday. She said she took \"2 tablets\" yesterday and will take \"1 1/2 tablets\" the rest of the week.  She will have INR rechecked on 4/5.  Dr. Floyd would like Hgb rechecked on/around 4/17 and then again at 5/7 appt.  I called patient with this information.  She would like labs drawn at Dr. Montoya' office as only a mile away from home.  She will schedule lab appt when she sees Dr. Rees on 4/5. Message to Dr. Rees letting her know she doesn't need to manage Hgb/APARNA Martinez RN         "

## 2021-05-27 NOTE — TELEPHONE ENCOUNTER
Was on the phone with the patient, informed her of scheduling an apt for lab only next week. Was in the process of looking at dates/times when the call got disconnected. Tried calling patient back and got her voicemail, asked her to call back to schedule a lab only apt for next week.

## 2021-05-27 NOTE — TELEPHONE ENCOUNTER
Pt contacted. She would like other labs in addition to her hgb at lab only visit next week. If additional labs are not appropriate, she would like to know why.     H.DeGree, CMA

## 2021-05-27 NOTE — TELEPHONE ENCOUNTER
Orders being requested: verbal orders for early termination of physical therapy  Reason service is needed/diagnosis: goals met  When are orders needed by: next few days  Where to send Orders: Phone: [phone]  Okay to leave detailed message?  Yes

## 2021-05-27 NOTE — PROGRESS NOTES

## 2021-05-27 NOTE — TELEPHONE ENCOUNTER
"Call from patient     >  Call from pt - Flank pain - new onset       Would like to get in to see Dr Rees as she know about the issue regarding the mesh filter she has       Primarily calling about L flank pain that started about Monday     > She has only one kidney and L side is now uncomfortable     > Also c/o lightheadness and fatigue - \"I feels just like I did when I lost the other kidney\"      > Temp 99.3F   > No pain with urination   > No vaginal discharge     > No vomiting     She wants to discuss the location of a mesh filter that had been on recall  - to be addressed by Heme / Onc in May     Also, a lump was noted by anti-coagulation RN this am above R knee     > \"Pinching\" chest pain and headache also reported as I was scheduling the caller with the scheduling group        Jean Jones RN   Triage and Medication Refills           Reason for Disposition    MODERATE pain (e.g., interferes with normal activities or awakens from sleep)    Protocols used: FLANK PAIN-A-OH      "

## 2021-05-27 NOTE — TELEPHONE ENCOUNTER
Please let the patient know that the other pain clinics we have are either at the [place] or at [place]. I'm happy to put a referral in for either if they would like.     Please help schedule for an office visit (40 min) after 5/7/19

## 2021-05-28 ENCOUNTER — HOSPITAL ENCOUNTER (OUTPATIENT)
Dept: MRI IMAGING | Facility: CLINIC | Age: 81
Discharge: HOME | End: 2021-05-28
Attending: NURSE PRACTITIONER
Payer: COMMERCIAL

## 2021-05-28 DIAGNOSIS — F07.81 POST CONCUSSION SYNDROME: ICD-10-CM

## 2021-05-28 DIAGNOSIS — G44.309 POST-CONCUSSION HEADACHE: ICD-10-CM

## 2021-05-28 ASSESSMENT — ANXIETY QUESTIONNAIRES
GAD7 TOTAL SCORE: 1
GAD7 TOTAL SCORE: 1

## 2021-05-28 NOTE — TELEPHONE ENCOUNTER
INR result is  1.9  INR   Date Value Ref Range Status   05/13/2019 1.70 (!) 0.9 - 1.1 Final       Will the patient be seen, or did they already see, MD or CNP today? No    Most Recent Warfarin dose day/week  Sunday Monday Tuesday Wednesday Thursday Friday Saturday   5 7.5 5 7.5 5 7.5 5     Sunday Monday Tuesday Wednesday Thursday Friday Saturday   5             Has the patient missed any doses of Coumadin, Warfarin, Jantoven in the past 7 days? No    Has the patients medications changed since the last visit? No    Has the patient experienced any bleeding recently? No    Has the patient experienced any injuries or illness recently? No    Has the patient experienced any 'new' shortness of breath, severe headaches, or changes in vision recently? No    Has the patient had any changes in their diet, or alcohol consumption? No    Is the patient here today to prepare for any type of upcoming surgery, procedure, or for a cardioversion procedure? No    What phone number can we reach the patient at today? Romana ZAMORA [facility]- [phone]

## 2021-05-28 NOTE — TELEPHONE ENCOUNTER
Spoke with NOAH Waller with [facility]. Gave approval to check INR today (4/22/19).    Caitlyn Posey RN, [facility] Anticoagulation Nurse  [phone]

## 2021-05-28 NOTE — TELEPHONE ENCOUNTER
INR result is 1.7  INR   Date Value Ref Range Status   05/06/2019 1.80 (!) 0.9 - 1.1 Final       Will the patient be seen, or did they already see, MD or CNP today? No    Most Recent Warfarin dose day/week  Sunday Monday Tuesday Wednesday Thursday Friday Saturday    7.5 5 7.5 5 5 5     Sunday Monday Tuesday Wednesday Thursday Friday Saturday   5             Has the patient missed any doses of Coumadin, Warfarin, Jantoven in the past 7 days? No    Has the patients medications changed since the last visit? No    Has the patient experienced any bleeding recently? No    Has the patient experienced any injuries or illness recently? No    Has the patient experienced any 'new' shortness of breath, severe headaches, or changes in vision recently? No    Has the patient had any changes in their diet, or alcohol consumption? No    Is the patient here today to prepare for any type of upcoming surgery, procedure, or for a cardioversion procedure? No    What phone number can we reach the patient at today? Helena rodriguez [facility] home care nurse [phone].

## 2021-05-28 NOTE — TELEPHONE ENCOUNTER
Call from patient's nurse. Needs a refill of Fentanyl and Morphine. Would like to get 30 day supply, since copays are a hardship,     I called nurse back to review if patient got her last Rxs dated 4/29. She did not get the Fentanyl. Will call the pharmacy.    Will review getting 30 day refills next time.

## 2021-05-28 NOTE — TELEPHONE ENCOUNTER
Orders being requested: Verbal order for delay of PT evaluation to Wed 5/22/19  Reason service is needed/diagnosis: Hypertensive chronic kidney disease  When are orders needed by: ASAP  Where to send Orders: Phone [phone]  Okay to leave detailed message?  Yes

## 2021-05-28 NOTE — TELEPHONE ENCOUNTER
INR result is 2.5  INR   Date Value Ref Range Status   04/17/2019 3.70 (!) 0.9 - 1.1 Final       Will the patient be seen, or did they already see, MD or CNP today? No    Most Recent Warfarin dose day/week  Sunday Monday Tuesday Wednesday Thursday Friday Saturday    5 7.5 held 7.5 5 7.5     Sunday Monday Tuesday Wednesday Thursday Friday Saturday   5             Has the patient missed any doses of Coumadin, Warfarin, Jantoven in the past 7 days? Yes 4/19    Has the patients medications changed since the last visit? Yes Increase Fentanyl from 50 to 62.5 Morphine was stopped on 4/19    Has the patient experienced any bleeding recently? No    Has the patient experienced any injuries or illness recently? No    Has the patient experienced any 'new' shortness of breath, severe headaches, or changes in vision recently? No    Has the patient had any changes in their diet, or alcohol consumption? No    Is the patient here today to prepare for any type of upcoming surgery, procedure, or for a cardioversion procedure? No    What phone number can we reach the patient at today? [place] [phone]

## 2021-05-28 NOTE — PATIENT INSTRUCTIONS - HE
PLAN:    Discussed increase Fentanyl to 50 mcg and 12.5 mcg patches, you may also look into cost of 62.5 mcg patches, and will stop morphine    Return in 4 weeks

## 2021-05-28 NOTE — TELEPHONE ENCOUNTER
ANTICOAGULATION  MANAGEMENT- Home Care/Care Facility Result    Assessment     Today's INR result of 2.50 is Therapeutic (goal INR of 2.0-3.0)        Missed dose(s) may be affecting INR    No new diet changes affecting INR    No interaction expected between discontinuing Morphine, increase Fentayl, starting B-12, krill oil and iron and warfarin    Continues to tolerate warfarin with no reported s/s of bleeding or thromboembolism     Previous INR was Supratherapeutic    Plan:     Spoke with NOAH Waller with [facility] discussed INR result and instructed:     Warfarin Dosing Instructions: Change warfarin dose to 5 mg daily  (11.8 % change)    Next INR to be drawn: 1 week, 4/29/19    Education provided: target INR goal and significance of current INR result and no interaction anticipated between warfarin and recent medication changes (see above)    NOAH Waller verbalizes understanding and agrees to warfarin dosing plan.   ?   Caitlyn Posey RN    Subjective/Objective:      Sandy Spencer, a 78 y.o. female is established on warfarin.     Home care/care facility RN's report of Sandy INR, recent warfarin dosing, diet changes, medication changes, and symptoms is documented below.    Additional findings: none    Anticoagulation Episode Summary     Current INR goal:   2.0-3.0   TTR:   57.1 % (2.4 wk)   Next INR check:   4/29/2019   INR from last check:   2.50 (4/22/2019)   Weekly max warfarin dose:      Target end date:      INR check location:      Preferred lab:      Send INR reminders to:   ANTICOAGULATION POOL C (DTN,VAD,CGR,GAV)    Indications    Other chronic pulmonary embolism without acute cor pulmonale (H) [I27.82]           Comments:            Anticoagulation Care Providers     Provider Role Specialty Phone number    Caitlyn Rees MD Referring Family Medicine [phone]

## 2021-05-28 NOTE — TELEPHONE ENCOUNTER
Test Results  Who is calling?:  Patient  Who ordered the test:  Dr. Rees  Type of test: Lab  Date of test:  05/10/2019  Where was the test performed:  [facility]  What are your questions/concerns?:  Patient is wondering if the lab results are ready  Okay to leave a detailed message?:  Yes

## 2021-05-28 NOTE — TELEPHONE ENCOUNTER
Orders being requested: Verbal orders for delay in start of care for below orders.   Reason service is needed/diagnosis: Patient unable to start physical therapy until the 21st  When are orders needed by: 05/20/19  Where to send Orders: Phone:  [phone]  Okay to leave detailed message?  Yes

## 2021-05-28 NOTE — PROGRESS NOTES
ASSESSMENT/PLAN:       1. Cluster headaches  -The patient has had some increase in her headaches lately but is wondering if they are more related to her sinuses, we will keep her Topamax at the same dose for now, and she will plan on following up here in the next 4 to 6 weeks for recheck  - topiramate (TOPAMAX) 50 MG tablet; Take 1 tablet (50 mg total) by mouth 2 (two) times a day.  Dispense: 180 tablet; Refill: 1    2. Seasonal allergic rhinitis, unspecified trigger  -She has not been doing Astelin as she has been worried about using this with her recent nephrectomy.  We discussed that this is topical and should not have significant effect on her kidneys nor does it say it well.  Restarting this given her increase in headaches and she will follow-up here in a month for recheck.  - azelastine (ASTEPRO) 0.15 % (205.5 mcg) Spry nasal spray; Apply 1 spray into each nostril 2 (two) times a day as needed.  Dispense: 30 mL; Refill: 11    3. Anemia due to blood loss, acute  -Updating labs today per the recommendation of hematology, replace accordingly.  - Ferritin  - Iron and Transferrin Iron Binding Capacity  - HM2(CBC w/o Differential)    4. Chronic kidney disease (CKD) stage G3a/A1, moderately decreased glomerular filtration rate (GFR) between 45-59 mL/min/1.73 square meter and albuminuria creatinine ratio less than 30 mg/g (H)  -Kidneys have been up and down, updating basic metabolic panel today to reevaluate her GFR.  - Basic Metabolic Panel    5. Weight loss  -Continues to have significant weight loss which I think is contributing to her feeling like her legs are bowlegged.  Going to have her do Ensure once daily, and then she will follow-up here in a month.  - food supplemt, lactose-reduced (ENSURE ORIGINAL) Liqd; Take 237 mL by mouth daily.  Dispense: 7110 mL; Refill: 11      Return for recheck.    30 minutes was spent face-to-face with the patient during this encounter and >50% of this was spent on counseling and  coordination of care. We discussed in depth the topics listed above.        Caitlyn Rees MD      PROGRESS NOTE   5/10/2019    SUBJECTIVE:  Sandy Spencer is a 78 y.o. female  who presents for follow up.     She continues to have issues with her blood pressure. She has been up since 3 AM, has been having lousy pain days and pain clinic is changing her medication. She is only getting two weeks at a time and it is costing her double. The morphine is cut in half. She did get two weeks worth of the 50 mcg and 12.5 mcg and it was 100$, a full month is only $50.     She did have a blood pressure that was 180 at the hematology clinic as she was worried about being there. She then had it rechecked and it was down to 130. She does have a low blood pressure today here inclinc.     Has been cleared to have her IVC filter removed.     She does worry that her Topamax is going to be more expensive. She does have more headahces again, getting them in the front and back. It is above the left eye and they are always on the left side only. She does have known allergies as well, does typically have these on the left side as well.     She does have some issues with her legs as well. She is getting bow legged again. She is going into the spine clinic and she is going to ask Dr. Ramirez if she can go back into [facility]. The more she walks the worst it hurts. She does have pain in her ankles and her knees bilaterally. She doesn't know if she needs to see an arthritis doctor. She does have issues with her joints. She is tired all the time. She does want to know what she can do to get muscle without causing more pain.       Chief Complaint   Patient presents with     Follow-up     Med Check, leg issues         Patient Active Problem List   Diagnosis     Chronic kidney disease (CKD) stage G3a/A1, moderately decreased glomerular filtration rate (GFR) between 45-59 mL/min/1.73 square meter and albuminuria creatinine ratio  less than 30 mg/g (H)     Focal glomerular sclerosis     Anxiety and depression     RSD lower limb, bilateral     ADD (attention deficit disorder)     RSD upper limb, right     Osteopenia     Major depression     Hypertension     Insomnia     Mixed hyperlipidemia     Right rotator cuff tear     Cluster headaches     Lumbar radiculopathy     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     Temporomandibular joint-pain-dysfunction syndrome (TMJ)     Pancreatitis     Localized swelling of lower leg     Acute right-sided low back pain without sciatica     Acquired hypothyroidism     Chronic pain syndrome     Snoring     Diarrhea     Abdominal pain, generalized     Dermatochalasis of left eyelid     Bilateral carotid artery stenosis     Coronary artery disease involving native coronary artery of native heart without angina pectoris     Sinus bradycardia     Other chronic pulmonary embolism without acute cor pulmonale (H)     Splenic infarction     Opioid type dependence, continuous (H)     Hematuria     Anemia due to blood loss, acute     Hydronephrosis     Bladder spasms     Hypotension, unspecified hypotension type     Acute reaction to stress     Anxiety disorder due to medical condition     Family relationship problem     History of posttraumatic stress disorder (PTSD)     Generalized muscle weakness       Current Outpatient Medications   Medication Sig Dispense Refill     acetaminophen (TYLENOL) 500 MG tablet Take 650 mg by mouth 3 (three) times a day.              calcium, as carbonate, (TUMS) 200 mg calcium (500 mg) chewable tablet Chew 1 tablet (200 mg total) 3 (three) times a day as needed. 30 tablet 0     cholecalciferol, vitamin D3, 5,000 unit Tab Take 1 tablet by mouth daily with supper.              cyanocobalamin/mecobalamin (CYANOCOBALAMIN-METHYLCOBALAMIN SL) Place 5,000 mcg under the tongue daily.       diclofenac sodium (VOLTAREN) 1 % Gel Apply 4 g topically. (apply to knees Q AM and Q 2pm and prn.   May self-administer)       diphenoxylate-atropine (LOMOTIL) 2.5-0.025 mg per tablet Take 1 tablet by mouth 4 (four) times a day as needed for diarrhea. 30 tablet 1     fentaNYL (DURAGESIC) 50 mcg/hr Apply 1 patch q 72 hours 5 patch 0     ferrous sulfate 65 mg elemental iron Take 1 tablet by mouth daily with breakfast.       furosemide (LASIX) 20 MG tablet Take by mouth. (every other day PRN for weight gain of 3# in 24 hours or SBP >180)       GENERLAC 10 gram/15 mL solution TK 30ML PO QD (Patient taking differently: Take 30 mL by mouth daily as needed. TK 30ML PO QD      ) 236 mL 3     hydrOXYzine HCl (ATARAX) 10 MG tablet Take 1 tablet (10 mg total) by mouth 3 (three) times a day. 90 tablet 1     KRILL OIL ORAL Take 350 mg by mouth daily.       Lactobacillus acidoph-L.bulgar (FLORANEX) 1 million cell Tab tablet Take 1 tablet by mouth daily. 30 tablet 0     lamoTRIgine (LAMICTAL) 5 MG TChD Take 1 tablet by mouth 2 (two) times a day.  0     levothyroxine (LEVO-T) 50 MCG tablet Take 1 tablet (50 mcg total) by mouth Daily at 6:00 am. 30 tablet 3     loperamide (IMODIUM) 2 mg capsule Take 2 mg by mouth 4 (four) times a day as needed for diarrhea .        1     morphine (MSIR) 15 MG tablet Take 0.5 tablets (7.5 mg total) by mouth 2 (two) times a day as needed for pain. 28 tablet 0     naloxone (NARCAN) 4 mg/actuation nasal spray 1 spray (4 mg dose) into one nostril for opioid reversal. Call 911. May repeat if no response in 3 minutes. 1 Box 0     promethazine (PHENERGAN) 12.5 MG tablet Take 1 tablet (12.5 mg total) by mouth every 6 (six) hours as needed for nausea. 20 tablet 1     rosuvastatin (CRESTOR) 40 MG tablet Take 1 tablet (40 mg total) by mouth daily. 30 tablet 3     senna-docusate (PERICOLACE) 8.6-50 mg tablet Take 2 tablets by mouth daily as needed. 30 tablet 0     SUMAtriptan (IMITREX) 50 MG tablet Take 1 tablet (50 mg total) by mouth every 2 (two) hours as needed for migraine. 27 tablet 1     topiramate  "(TOPAMAX) 50 MG tablet Take 1 tablet (50 mg total) by mouth 2 (two) times a day. 180 tablet 1     traZODone (DESYREL) 100 MG tablet TAKE 2 TO 4 TABLETS(200  MG) BY MOUTH AT BEDTIME AS NEEDED FOR SLEEP 360 tablet 0     warfarin (COUMADIN/JANTOVEN) 5 MG tablet Take one to two tablets (5 to 10 mg) by mouth daily. Adjust dose based on INR results as directed. 180 tablet 1     azelastine (ASTEPRO) 0.15 % (205.5 mcg) Spry nasal spray Apply 1 spray into each nostril 2 (two) times a day as needed. 30 mL 11     food supplemt, lactose-reduced (ENSURE ORIGINAL) Liqd Take 237 mL by mouth daily. 7110 mL 11     Current Facility-Administered Medications   Medication Dose Route Frequency Provider Last Rate Last Dose     denosumab 60 mg (PROLIA 60 mg/ml)  60 mg Subcutaneous Q6 Months Andrie Olivera MD   60 mg at 19 1318       Social History     Tobacco Use   Smoking Status Former Smoker     Packs/day: 1.00     Years: 20.00     Pack years: 20.00     Last attempt to quit: 2000     Years since quittin.3   Smokeless Tobacco Never Used           OBJECTIVE:        Recent Results (from the past 240 hour(s))   INR   Result Value Ref Range    INR 1.80 (!) 0.9 - 1.1   HM2(CBC w/o Differential)   Result Value Ref Range    WBC 3.3 (L) 4.0 - 11.0 thou/uL    RBC 3.63 (L) 3.80 - 5.40 mill/uL    Hemoglobin 11.4 (L) 12.0 - 16.0 g/dL    Hematocrit 35.3 35.0 - 47.0 %    MCV 97 80 - 100 fL    MCH 31.5 27.0 - 34.0 pg    MCHC 32.4 32.0 - 36.0 g/dL    RDW 14.0 11.0 - 14.5 %    Platelets 148 140 - 440 thou/uL    MPV 7.3 7.0 - 10.0 fL       Vitals:    05/10/19 1210   BP: (!) 80/60   Pulse: 93   Resp: 16   SpO2: 97%   Weight: 125 lb (56.7 kg)   Height: 5' 2\" (1.575 m)     Weight: 125 lb (56.7 kg)          Physical Exam:  GENERAL APPEARANCE: A&A, NAD, well hydrated, well nourished  SKIN:  Normal skin turgor, no lesions/rashes   HEENT: moist mucous membranes, no rhinorrhea  NECK: Normal  CV: RRR, no M/G/R   LUNGS: CTAB  ABDOMEN: " S&NT, no masses, incisions are well-healed  EXTREMITY: no edema, tenderness to palpation over the head of the fibula bilaterally, right greater than left  NEURO: no gross deficits   Psych: The patient's affect is bright, she is casually dressed and groomed, her thought process and speech pattern are normal

## 2021-05-28 NOTE — TELEPHONE ENCOUNTER
Pt contacted. She is planning on scheduling a lab only visit but needs to figure out when she can get here. Will call back to schedule.

## 2021-05-28 NOTE — TELEPHONE ENCOUNTER
INR result is 1.8  INR   Date Value Ref Range Status   04/22/2019 2.50 (!) 0.9 - 1.1 Final       Will the patient be seen, or did they already see, MD or CNP today? No    Most Recent Warfarin dose day/week  Sunday Monday Tuesday Wednesday Thursday Friday Saturday    5 5 5 5 5 5     Sunday Monday Tuesday Wednesday Thursday Friday Saturday   5             Has the patient missed any doses of Coumadin, Warfarin, Jantoven in the past 7 days? Yes missed 4/28/19    Has the patients medications changed since the last visit? No    Has the patient experienced any bleeding recently? No    Has the patient experienced any injuries or illness recently? No    Has the patient experienced any 'new' shortness of breath, severe headaches, or changes in vision recently? No    Has the patient had any changes in their diet, or alcohol consumption? No    Is the patient here today to prepare for any type of upcoming surgery, procedure, or for a cardioversion procedure? No    What phone number can we reach the patient at today? Please call Christin back with dosing.

## 2021-05-28 NOTE — TELEPHONE ENCOUNTER
ANTICOAGULATION  MANAGEMENT- Home Care/Care Facility Result    Assessment     Today's INR result of 1.70 is Subtherapeutic (goal INR of 2.0-3.0)        Warfarin taken as previously instructed     Despite the increased Warfarin dose over the week, then INR went down.    No new diet changes affecting INR    No new medication/supplements affecting INR    Continues to tolerate warfarin with no reported s/s of bleeding or thromboembolism     Previous INR was Subtherapeutic    Plan:     Spoke with NOAH Waller with [facility] discussed INR result and instructed:     Warfarin Dosing Instructions: Change warfarin dose to 7.5 mg daily on MON / WED / FRI; and 5 mg daily rest of week  (6.7 % change)    Next INR to be drawn: 1 week, 5/20/19    Education provided: importance of therapeutic range and importance of notifying clinic of upcoming surgeries and procedures 2 weeks in advance    NOAH Waller verbalizes understanding and agrees to warfarin dosing plan.   ?   Caitlyn Posey RN    Subjective/Objective:      Sandy Spencer, a 78 y.o. female is established on warfarin.     Home care/care facility RN's report of Sandy INR, recent warfarin dosing, diet changes, medication changes, and symptoms is documented below.    Additional findings: Patient will have an IVC filter removal surgery in the future (not yet scheduled).    Anticoagulation Episode Summary     Current INR goal:   2.0-3.0   TTR:   38.7 % (1.3 mo)   Next INR check:   5/20/2019   INR from last check:   1.70! (5/13/2019)   Weekly max warfarin dose:      Target end date:      INR check location:      Preferred lab:      Send INR reminders to:   ANTICOAGULATION POOL C (DTN,VAD,CGR,GAV)    Indications    Other chronic pulmonary embolism without acute cor pulmonale (H) [I27.82]           Comments:            Anticoagulation Care Providers     Provider Role Specialty Phone number    Caitlyn Rees MD Referring Family Medicine [phone]

## 2021-05-28 NOTE — TELEPHONE ENCOUNTER
----- Message from Caitlyn Rees MD sent at 4/25/2019  8:16 AM CDT -----  Call patient to notify her of her hemoglobin is up over 11 which is great news!    Her kidneys have improved quite a bit as well, the GFR is back to 47 from 33. Her electrolytes otherwise look better as well confirming that her appetite must be improving some. All good news.
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
## 2021-05-28 NOTE — TELEPHONE ENCOUNTER
Who is calling:  [facility]  Reason for Call:  They are looking to change INR date from Wednesday the 24th to Today, April 22  Date of last appointment with primary care: n/a  Okay to leave a detailed message: Yes

## 2021-05-28 NOTE — TELEPHONE ENCOUNTER
[facility] [employer] [facility]      Placed in Dr. Rees's inbox      04/23/19

## 2021-05-28 NOTE — TELEPHONE ENCOUNTER
INR result is 1.8  INR   Date Value Ref Range Status   04/29/2019 1.80 (!) 0.9 - 1.1 Final       Will the patient be seen, or did they already see, MD or CNP today? No    Most Recent Warfarin dose day/week  Sunday Monday Tuesday Wednesday Thursday Friday Saturday   5 7.5 5 7.5 5 5 5     Sunday Monday Tuesday Wednesday Thursday Friday Saturday                Has the patient missed any doses of Coumadin, Warfarin, Jantoven in the past 7 days? Yes last tuesday    Has the patients medications changed since the last visit? No    Has the patient experienced any bleeding recently? No    Has the patient experienced any injuries or illness recently? No    Has the patient experienced any 'new' shortness of breath, severe headaches, or changes in vision recently? No    Has the patient had any changes in their diet, or alcohol consumption? No    Is the patient here today to prepare for any type of upcoming surgery, procedure, or for a cardioversion procedure? No    What phone number can we reach the patient at today? home phone listed in demographics.

## 2021-05-28 NOTE — TELEPHONE ENCOUNTER
Brie calls from [employer], patient would like to go back to previous plan of care.  Morphine 7.5 mg twice daily  Fentanyl 50 mcg patch    Currently plan of care is for fentanyl 62.5 mcg patch every three days.  This has not been effective, is too expensive and difficult for her to get to pharmacy this often.    Brie explains patient is currently going to the food shelf and is quite limited on funds.    Previous plan for medications was much more affordable and effective for pain.    Will cue fentanyl 50 mcg patch refill  Please advise as to morphine RX.

## 2021-05-28 NOTE — TELEPHONE ENCOUNTER
Iron is still low but improving which is great news., hemoglobin is stable at 11.4.         Kidneys are stable as well with the GFR at 41 and creatinine at 1.27.
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
## 2021-05-28 NOTE — TELEPHONE ENCOUNTER
ANTICOAGULATION  MANAGEMENT- Home Care/Care Facility Result    Assessment     Today's INR result of 1.90 is Subtherapeutic (goal INR of 2.0-3.0)        Warfarin taken as previously instructed    No new diet changes affecting INR    No new medication/supplements affecting INR    Continues to tolerate warfarin with no reported s/s of bleeding or thromboembolism     Previous INR was Subtherapeutic    Plan:     Spoke with NOAH Avendano discussed INR result and instructed:     Warfarin Dosing Instructions: Change warfarin dose to 5 mg daily on SUN / WED / FRI; and 7.5 mg daily rest of week  (5.9 % change)    Next INR to be drawn: 5/28/19    Education provided: importance of therapeutic range    NOAH Avendano verbalizes understanding and agrees to warfarin dosing plan.   ?   Caitlyn Posey RN    Subjective/Objective:      Sandy Spencer, a 78 y.o. female is established on warfarin.     Home care/care facility RN's report of Sandy INR, recent warfarin dosing, diet changes, medication changes, and symptoms is documented below.    Additional findings: none    Anticoagulation Episode Summary     Current INR goal:   2.0-3.0   TTR:   32.7 % (1.5 mo)   Next INR check:   5/28/2019   INR from last check:   1.90! (5/20/2019)   Weekly max warfarin dose:      Target end date:      INR check location:      Preferred lab:      Send INR reminders to:   ANTICOAGULATION POOL C (DTN,VAD,CGR,GAV)    Indications    Other chronic pulmonary embolism without acute cor pulmonale (H) [I27.82]           Comments:            Anticoagulation Care Providers     Provider Role Specialty Phone number    Caitlyn Rees MD Referring Family Medicine [phone]

## 2021-05-28 NOTE — TELEPHONE ENCOUNTER
ANTICOAGULATION  MANAGEMENT- Home Care/Care Facility Result    Assessment     Today's INR result of 1.80 is Subtherapeutic (goal INR of 2.0-3.0)        Missed dose(s) may be affecting INR    No new diet changes affecting INR    No new medication/supplements affecting INR    Continues to tolerate warfarin with no reported s/s of bleeding or thromboembolism     Previous INR was Therapeutic    Plan:     Spoke with NOAH Waller with [facility] discussed INR result and instructed:     Warfarin Dosing Instructions: Take a boost dose of 7.5 mg today;  then change warfarin dose to 7.5 mg daily on WED; and 5 mg daily rest of week  (7.1 % change)    Next INR to be drawn: 1 week, 5/6/19    Education provided: importance of taking warfarin as instructed    NOAH Waller verbalizes understanding and agrees to warfarin dosing plan.   ?   Caitlyn Posey RN    Subjective/Objective:      Sandy Spencer, a 78 y.o. female is established on warfarin.     Home care/care facility RN's report of Sandy INR, recent warfarin dosing, diet changes, medication changes, and symptoms is documented below.    Additional findings: none    Anticoagulation Episode Summary     Current INR goal:   2.0-3.0   TTR:   61.3 % (3.4 wk)   Next INR check:   5/6/2019   INR from last check:   1.80! (4/29/2019)   Weekly max warfarin dose:      Target end date:      INR check location:      Preferred lab:      Send INR reminders to:   ANTICOAGULATION POOL C (DTN,VAD,CGR,GAV)    Indications    Other chronic pulmonary embolism without acute cor pulmonale (H) [I27.82]           Comments:            Anticoagulation Care Providers     Provider Role Specialty Phone number    Caitlyn Rees MD Referring Family Medicine [phone]

## 2021-05-28 NOTE — TELEPHONE ENCOUNTER
Orders being requested: Skilled nurse visits, 1 x per week for 5 weeks. Physical therapy evaluation  Reason service is needed/diagnosis: Anti-coagulation management and general assessment  When are orders needed by: ASAP  Where to send Orders: Phone:  [phone]  Okay to leave detailed message?  Yes

## 2021-05-28 NOTE — TELEPHONE ENCOUNTER
ANTICOAGULATION  MANAGEMENT- Home Care/Care Facility Result    Assessment     Today's INR result of 1.8 is Subtherapeutic (goal INR of 2.0-3.0)        Missed dose(s) may be affecting INR    No new diet changes affecting INR    No new medication/supplements affecting INR    Continues to tolerate warfarin with no reported s/s of bleeding or thromboembolism     Previous INR was Subtherapeutic     Christin reported that the patient would like to try taking warfarin in the morning due to she frequently forgets to take her dose in the evening. Discussed to make sure to have the patient not to take warfarin dose on the day INR is checked, until INR result is discussed with ACN.    Plan:     Spoke with Rolf RN with [facility] discussed INR result and instructed:     Warfarin Dosing Instructions: Have the patient take a one time booster dose of 7.5 mg dose today then continue current warfarin dose    7.5 mg every Wed; 5 mg all other days        (0 % change)    Next INR to be drawn: one week.    Education provided: importance of therapeutic range, target INR goal and significance of current INR result and importance of taking warfarin as instructed    Christin verbalizes understanding and agrees to warfarin dosing plan.   ?   Litzy Gore RN    Subjective/Objective:      Sandy Spencer, a 78 y.o. female is established on warfarin.     Home care/care facility RN's report of Sandy INR, recent warfarin dosing, diet changes, medication changes, and symptoms is documented below.    Additional findings: Christin reported that the patient would like to try taking warfarin I the morning due to she frequently forgets to take her dose in the evening. Discussed to make sure to have the patient not to take warfarin dose on the day INR is checked, until INR result is discussed with ACN.    Anticoagulation Episode Summary     Current INR goal:   2.0-3.0   TTR:   47.4 % (1 mo)   Next INR check:   5/13/2019   INR  from last check:   1.80! (5/6/2019)   Weekly max warfarin dose:      Target end date:      INR check location:      Preferred lab:      Send INR reminders to:   ANTICOAGULATION POOL C (DTN,VAD,CGR,GAV)    Indications    Other chronic pulmonary embolism without acute cor pulmonale (H) [I27.82]           Comments:            Anticoagulation Care Providers     Provider Role Specialty Phone number    Caitlyn Rees MD Referring Family Medicine [phone]

## 2021-05-28 NOTE — PROGRESS NOTES
Assessment/Plan:     Problem List Items Addressed This Visit     RSD upper limb, right - Primary    Relevant Medications    fentaNYL (DURAGESIC) 12 mcg/hr              No follow-ups on file.    Patient Instructions   PLAN:    Discussed increase Fentanyl to 50 mcg and 12.5 mcg patches, you may also look into cost of 62.5 mcg patches, and will stop morphine    Return in 4 weeks        Subjective:       78 y.o. female presents for evaluation of multiple problems, previously having chronic regional pain syndrome lower extremities and arm, migraine headaches.    Is accompanied by a friend that is a nurse from her [employer].    Reviews significant medical problems.  She has had a nephrectomy, concern initially for cancer finding number of blood clots.  Apparently there was a vein cut after his surgery for which she has significant bleeding, required transfusion.  She was in a transitional care unit for 10 days now is home.  She is having her INR monitored.  There is discussion about the filter in her neck can be removed.    Reviews having a geniculate block for her knee which is very painful and did not have benefit.  Continues concern with significant pain through her back hips knee.    She is follow-up urology.    Reviewed there is a concern in the hospital where she appeared quite oversedated at one time with fentanyl and morphine.  Her pain service was monitoring.    She is presently using fentanyl 50 mcg patch with morphine 15 mg immediate release one half twice a day.  She continues with concerns for diffuse pain.    Described yesterday she had a long day at the dentist with flared up pain.    She has lost 35 pounds, unclear how released all her medical problems though reports eating better.    She notes this Easter she will be with her 3 children for which she is pleased.    Discussed ways to address the pain, she is concerned that different increments of the fentanyl patch such as 12 mcg 30 seven-point micrograms are  very expensive.  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      Current Outpatient Medications:      acetaminophen (TYLENOL) 500 MG tablet, Take 650 mg by mouth 3 (three) times a day.    , Disp: , Rfl:      calcium, as carbonate, (TUMS) 200 mg calcium (500 mg) chewable tablet, Chew 1 tablet (200 mg total) 3 (three) times a day as needed., Disp: 30 tablet, Rfl: 0     cholecalciferol, vitamin D3, 5,000 unit Tab, Take 1 tablet by mouth daily with supper.    , Disp: , Rfl:      diclofenac sodium (VOLTAREN) 1 % Gel, Apply 4 g topically. (apply to knees Q AM and Q 2pm and prn.  May self-administer), Disp: , Rfl:      diphenoxylate-atropine (LOMOTIL) 2.5-0.025 mg per tablet, Take 1 tablet by mouth 4 (four) times a day as needed for diarrhea., Disp: 30 tablet, Rfl: 1     fentaNYL (DURAGESIC) 12 mcg/hr, Place 1 patch on the skin every third day., Disp: 5 patch, Rfl: 0     fentaNYL (DURAGESIC) 50 mcg/hr, Apply 1 patch q 72 hours with 12.5 mcg patch, Disp: 5 patch, Rfl: 0     food supplemt, lactose-reduced (BOOST BREEZE NUTRITIONAL) 0.04-1.05 gram-kcal/mL Liqd, Take 237 mL by mouth daily., Disp: 30 Bottle, Rfl: 2     furosemide (LASIX) 20 MG tablet, Take by mouth. (every other day PRN for weight gain of 3# in 24 hours or SBP >180), Disp: , Rfl:      GENERLAC 10 gram/15 mL solution, TK 30ML PO QD (Patient taking differently: Take 30 mL by mouth daily as needed. TK 30ML PO QD   ), Disp: 236 mL, Rfl: 3     hydrOXYzine HCl (ATARAX) 10 MG tablet, Take 1 tablet (10 mg total) by mouth 3 (three) times a day., Disp: 90 tablet, Rfl: 1     Lactobacillus acidoph-L.bulgar (FLORANEX) 1 million cell Tab tablet, Take 1 tablet by mouth daily., Disp: 30 tablet, Rfl: 0     levothyroxine (LEVO-T) 50 MCG tablet, Take 1 tablet (50 mcg total) by mouth Daily at 6:00 am., Disp: 30 tablet, Rfl: 3     loperamide (IMODIUM) 2 mg capsule, Take 2 mg by mouth 4 (four) times a day as needed for  diarrhea .   , Disp: , Rfl: 1     morphine (MSIR) 15 MG tablet, Take 0.5 tablets (7.5 mg total) by mouth every 6 (six) hours as needed., Disp: 32 tablet, Rfl: 0     morphine (MSIR) 15 MG tablet, One-half tab four times a day, Disp: 28 tablet, Rfl: 0     naloxone (NARCAN) 4 mg/actuation nasal spray, 1 spray (4 mg dose) into one nostril for opioid reversal. Call 911. May repeat if no response in 3 minutes., Disp: 1 Box, Rfl: 0     omeprazole (PRILOSEC) 40 MG capsule, TAKE 1 CAPSULE(40 MG) BY MOUTH DAILY BEFORE BREAKFAST, Disp: 90 capsule, Rfl: 0     promethazine (PHENERGAN) 12.5 MG tablet, Take 1 tablet (12.5 mg total) by mouth every 6 (six) hours as needed for nausea., Disp: 20 tablet, Rfl: 1     rosuvastatin (CRESTOR) 40 MG tablet, Take 1 tablet (40 mg total) by mouth daily., Disp: 30 tablet, Rfl: 3     senna-docusate (PERICOLACE) 8.6-50 mg tablet, Take 2 tablets by mouth daily as needed., Disp: 30 tablet, Rfl: 0     SUMAtriptan (IMITREX) 50 MG tablet, Take 1 tablet (50 mg total) by mouth every 2 (two) hours as needed for migraine., Disp: 27 tablet, Rfl: 1     topiramate (TOPAMAX) 50 MG tablet, Take 0.5-1 tablets (25-50 mg total) by mouth 2 (two) times a day., Disp: 20 tablet, Rfl: 0     traZODone (DESYREL) 100 MG tablet, TAKE 2 TO 4 TABLETS(200  MG) BY MOUTH AT BEDTIME AS NEEDED FOR SLEEP, Disp: 360 tablet, Rfl: 0     triamcinolone (KENALOG) 0.1 % ointment, Apply small amount to affected 2-3 times daily as needed, Disp: 60 g, Rfl: 1     warfarin (COUMADIN/JANTOVEN) 5 MG tablet, Take one to two tablets (5 to 10 mg) by mouth daily. Adjust dose based on INR results as directed., Disp: 180 tablet, Rfl: 1    Current Facility-Administered Medications:      denosumab 60 mg (PROLIA 60 mg/ml), 60 mg, Subcutaneous, Q6 Months, Andrei Olivera MD, 60 mg at 04/05/19 1318  Is alert clear sensorium good eye contact.  Thought process tight logical.  Affect is fairly full range and she appears doing much better than  "when last seen.          Objective:     Vitals:    04/19/19 0742   BP: 121/68   Pulse: 76   Resp: 16   Weight: 126 lb (57.2 kg)   Height: 5' 2\" (1.575 m)   PainSc:   6   PainLoc: Back     She will discontinue the morphine, discussed changing the fentanyl to 50 and a 12.5 mcg patch or 62.5 mcg patch.  She will clarify cost.    Time spent more than 15 minutes face-to-face 50% counseling about above condition and coordination of treatment plan          This note has been dictated using voice recognition software. Any grammatical or context distortions are unintentional and inherent to the software  "

## 2021-05-28 NOTE — TELEPHONE ENCOUNTER
Patient Returning Call  Reason for call:  Results  Information relayed to patient:  Writer read the following to patient per Dr Rees :Call patient to notify her of her hemoglobin is up over 11 which is great news!  Her kidneys have improved quite a bit as well, the GFR is back to 47 from 33. Her electrolytes otherwise look better as well confirming that her appetite must be improving some. All good news.  Patient has additional questions:  Yes  If YES, what are your questions/concerns:  She is asking fo ra copy of her labs to take to colon doctor.  Transferred to medical records.   Okay to leave a detailed message?: No call back needed

## 2021-05-29 NOTE — TELEPHONE ENCOUNTER
Called and left NOAH Waller with [employer] a detailed voice mail. Warfarin dosing instructions and INR re-check date will remain the same. No changes needed.    Caitlyn Posey RN, [facility] Anticoagulation Nurse  [phone]

## 2021-05-29 NOTE — TELEPHONE ENCOUNTER
ANTICOAGULATION  MANAGEMENT    Assessment     Today's INR result of 1.5 is Subtherapeutic (goal INR of 2.0-3.0)        Warfarin taken as previously instructed    No new diet changes affecting INR, she continues to eat asparagus every day and Ensure every day    No new medication/supplements affecting INR    Continues to tolerate warfarin with no reported s/s of bleeding or thromboembolism     Previous INR was Subtherapeutic, patient was resumed on lovenox last week d/t persistently low INR    Plan:     Spoke with Sandy regarding INR result and instructed:     Warfarin Dosing Instructions:  Change warfarin dose to 10 mg daily on MWF; and 7.5 mg daily rest of week  (14 % change from the avg daily dose she has had in the last week). Continue on lovenox until you are told to stop.     Instructed patient to follow up no later than: Thur 6/20    Education provided: importance of consistent vitamin K intake, impact of vitamin K foods on INR, importance of therapeutic range and target INR goal and significance of current INR result, and purpose of lovenox bridging until INR is therapeutic    Sandy verbalizes understanding and agrees to warfarin dosing plan.    Instructed to call the [facility] for any changes, questions or concerns. (#[phone])   ?   Shey Tilley RN    Subjective/Objective:      Sandy Spencer, a 78 y.o. female is on warfarin.     Sandy reports:     Home warfarin dose: as updated on anticoagulation calendar per template     Missed doses: No (marked yes on the lab template, ACN confirmed this was an error)     Medication changes:  No     S/S of bleeding or thromboembolism:  No     New Injury or illness:  No     Changes in diet or alcohol consumption:  No     Upcoming surgery, procedure or cardioversion:  No    Anticoagulation Episode Summary     Current INR goal:   2.0-3.0   TTR:   20.0 % (2.4 mo)   Next INR check:   6/20/2019   INR from last check:   1.50! (6/17/2019)   Weekly max warfarin dose:       Target end date:      INR check location:      Preferred lab:      Send INR reminders to:   [facility]    Indications    Other chronic pulmonary embolism without acute cor pulmonale (H) [I27.82]           Comments:            Anticoagulation Care Providers     Provider Role Specialty Phone number    Caitlyn Rees MD Referring Family Medicine [phone]

## 2021-05-29 NOTE — PROGRESS NOTES
Pt is being seen today by Nikunj Lynn MD, for refill and f/u of 7-constant, burning, sharp, aching bilat knee, bilat hips and back pain.  F4

## 2021-05-29 NOTE — TELEPHONE ENCOUNTER
Who is calling:  patient  Reason for Call:  Patient states she did not receive message left regarding her INR instructions, patient is requesting a call back. Please advise, thank you.  Date of last appointment with primary care: 6-10-19  Okay to leave a detailed message: Yes, patient states she will be unavailable in 30 minutes, requests a call back as soon as possible

## 2021-05-29 NOTE — TELEPHONE ENCOUNTER
"Anticoagulation Annual Referral Renewal Review    Sandy Spencer's chart reviewed for annual renewal of referral to anticoagulation monitoring.        Criteria for anticoagulation nurse and/or pharmacist renewal met   Warfarin indication: PE No, provider assessment if extended anticoagulation required   Current with INR monitoring/compliant Yes Yes   Date of last office visit 5/10/19 Yes, had office visit within last year   Time in Therapeutic Range (TTR) 32.35 % No, TTR < 60 %       Sandy Spencer did NOT meet all criteria for anticoagulation management program initiated renewal and requires provider review. Using dot phrase, \".acmrenewalprovider\", please advise if Sandy's anticoagulation management referral should be renewed or if patient should be seen in office to review anticoagulation therapy      Caitlyn Posey RN  10:26 AM      "

## 2021-05-29 NOTE — TELEPHONE ENCOUNTER
"ANTICOAGULATION  MANAGEMENT- Home Care/Care Facility Result    Assessment     Today's INR result of 1.30 is Subtherapeutic (goal INR of 2.0-3.0)        Missed dose(s) may be affecting INR. Patient missed dose on 6/1/19, and then \"self-dosed\" 6/2/19 and she took 7.5 mg rather than the instructed 5 mg.    Patient takes Warfarin in the morning due to forgetting evening medications frequently.    Patient has had a SUBtherapeutic INR since 4/29/19.    No new diet changes affecting INR    No interaction expected between Lamictal decrease and warfarin    Continues to tolerate warfarin with no reported s/s of bleeding or thromboembolism     Previous INR was Subtherapeutic     Patient scheduled for an IVC filter removal tomorrow 6/4/19. (No changed to Warfarin needed).    Plan:     Spoke with NOAH Waller with [facility] care discussed INR result and instructed:     Warfarin Dosing Instructions: Take a BOOST dose of 10 mg today;  then change warfarin dose to 5 mg daily on SUN / THUR; and 7.5 mg daily rest of week  (5.6 % change)    Next INR to be drawn: 1 week 6/10/19    Education provided: target INR goal and significance of current INR result, importance of taking warfarin as instructed, Strategies to improve compliance (pillbox, alarm, calendar, etc), monitoring for clotting signs and symptoms and when to seek medical attention/emergency care    NOAH Waller verbalizes understanding and agrees to warfarin dosing plan.   ?   Caitlyn Posey RN    Subjective/Objective:      Sandy Spencer, a 78 y.o. female is established on warfarin.     Home care/care facility RN's report of Sandy INR, recent warfarin dosing, diet changes, medication changes, and symptoms is documented below.    Additional findings: Recommended that home care nurse contact cardiology regarding low INR and her upcoming procedure for IVC filter removal.    Anticoagulation Episode Summary     Current INR goal:   2.0-3.0   TTR:   24.9 % (2 mo)   Next " INR check:   6/10/2019   INR from last check:   1.30! (6/3/2019)   Weekly max warfarin dose:      Target end date:      INR check location:      Preferred lab:      Send INR reminders to:   [facility]    Indications    Other chronic pulmonary embolism without acute cor pulmonale (H) [I27.82]           Comments:            Anticoagulation Care Providers     Provider Role Specialty Phone number    Caitlyn Rees MD Referring Family Medicine [phone]

## 2021-05-29 NOTE — TELEPHONE ENCOUNTER
ANTICOAGULATION  MANAGEMENT- Home Care/Care Facility Result    Assessment     Today's INR result of 1.20 is Subtherapeutic (goal INR of 2.0-3.0)        Missed dose(s) may be affecting INR. Patient missed both weekend Warfarin doses. Patient frequently misses doses. Last INR on 6/3/19 was 1.30. Last therapeutic INR was April 22, 2019. Home care services end today. Discussed moving Warfarin medication to morning meds. Patient dies not miss her morning medications. Patient has an office visit with PCP today. She will be bringing in her INR log for the last month.    No new diet changes affecting INR    No new medication/supplements affecting INR    Continues to tolerate warfarin with no reported s/s of bleeding or thromboembolism     Previous INR was Subtherapeutic    Plan:     Spoke with NOAH Waller with [facility] discussed INR result and instructed:     Warfarin Dosing Instructions: Take a boost dose of 10 mg today;  then change warfarin dose to 7.5 mg (10.5 % change).  Re-start Lovenox injections every 12 hours until INR is therapeutic >2.0.    Next INR to be drawn: 6/13/19    Education provided: importance of taking warfarin as instructed, Strategies to improve compliance (pillbox, alarm, calendar, etc), monitoring for clotting signs and symptoms and when to seek medical attention/emergency care    NOAH Waller verbalizes understanding and agrees to warfarin dosing plan.   ?   Caitlyn Posey RN    Subjective/Objective:      Sandy Spencer, a 78 y.o. female is established on warfarin.     Home care/care facility RN's report of Sandy INR, recent warfarin dosing, diet changes, medication changes, and symptoms is documented below.    Additional findings: Home care services end today    Anticoagulation Episode Summary     Current INR goal:   2.0-3.0   TTR:   22.3 % (2.2 mo)   Next INR check:   6/13/2019   INR from last check:   1.20! (6/10/2019)   Weekly max warfarin dose:      Target end date:       INR check location:      Preferred lab:      Send INR reminders to:   [facility]    Indications    Other chronic pulmonary embolism without acute cor pulmonale (H) [I27.82]           Comments:            Anticoagulation Care Providers     Provider Role Specialty Phone number    Caitlyn Rees MD Referring Family Medicine [phone]

## 2021-05-29 NOTE — TELEPHONE ENCOUNTER
Left message for Sandy. Requested a call back to the [facility] department to discuss warfarin dosing. Please transfer call to Caitlyn beebe [phone].

## 2021-05-29 NOTE — TELEPHONE ENCOUNTER
6/3/19 1603:  Message left to call patient back at [phone].  Called patient back-left message/APARNA Martinez RN     6/3/19 1613:  Patient called and left message.  She said that she was supposed to have IVC filter removed tomorrow but they canceled it because her INR was 1.3.  She said her INR was normal and 2.5 when Dr. Floyd scheduled the removal so she's wondering why it can't be removed and asking for Dr. Floyd to order and schedule. She asked for a call back to [phone]/APARNA Martinez RN     6/4/19 1639:  Called patient. Patient said she was driving and asked that I call her back in 5 minutes/APARNA Martinez RN

## 2021-05-29 NOTE — TELEPHONE ENCOUNTER
Spoke with NOAH Waller with [facility].   Gave instructions for Warfarin, Take a boost dose of 10 mg tonight, then continue current dose of 5 mg SUN / WED / FRI; and 7.5 mg all other days of the week. Re-check INR 6/3/19.     No changes needed for upcoming IVC filter removal per PCP.    NOHA Waller agreed to plan and verbalized understanding.    Caitlyn Posey RN, [facility] Anticoagulation Nurse  [phone]

## 2021-05-29 NOTE — PROGRESS NOTES
[facility] Daily Progress     Patient Name: Sandy Spencer  Date: 6/17/2019  Visit #: 2  PTA visit #:     Referral Diagnosis: B knee pain  Referring provider: Michael Ramirez DO  Visit Diagnosis:     ICD-10-CM    1. Chronic pain syndrome G89.4    2. Knee pain, chronic M25.569     G89.29    3. Acute right-sided low back pain without sciatica M54.5    4. Chronic pain of both knees M25.561     M25.562     G89.29    5. Localized swelling of lower leg R22.40    6. Physical deconditioning R53.81          Assessment:     Patient is benefitting from skilled physical therapy and is making steady progress toward functional goals.  Patient is appropriate to continue with skilled physical therapy intervention, as indicated by initial plan of care.   She did have good release of fascial tensions with treatment today.     Goal Status:  Pt. will demonstrate/verbalize independence in self-management of condition in : 12 weeks  Pt. will be independent with home exercise program in : 12 weeks  Pt. will have improved quality of sleep: with less pain;waking less times/night;in 6 weeks  Pt. will show improved balance for safer : ambulation;standing;for dressing/grooming;for chores;for community ambulation;for exercise/recreation;independently;in 12 weeks  Pt. will be able to walk : 30 minutes;on uneven/inclined surfaces;1 mile;with less pain;with less difficulty;for community mobility;for exercise/recreation;in 12 weeks  Pt. will bend: to dress;to clean;with less pain;with less difficulty;for self care;for exercise/recreation;in 12 weeks  Patient will ascend / descend: stairs;step;curb;without railing;with recipricol gait;without assistive device;independently;with less pain;with less difficulty;in 12 weeks  Patient will sit: 60 minutes;for driving;for watching TV;for other activity;with less pain;with less difficultty;in 12 weeks  Patient will transfer: sit/stand;supine/sit;floor/stand;for toileting;for in/out of bed;for  in/out of chair;for other activity;with less pain;with less difficulty;in 12 weeks    No data recorded    Plan / Patient Education:     Plan to con't with manual therapy to decrease fascial tension/tone to normalize ROM/decrease inflammation/decrease mm tone and improve proprioception.      Subjective:     Pain Ratin    Her legs are just really bad with the R more than the L.   The pain does always seem to be about an 8.   She did sit last week with the ED with a friend and her leg has been really cold since then. She is trying to use hot pads/baths but it isn't doing the trick.     Objective:     MS, neural fascial tensions.     Treatment Today   2019   TREATMENT MINUTES COMMENTS   Evaluation     Self-care/ Home management     Manual therapy 25 Fascial release using Strain-Counterstrain of B pre and post-gang (lowers) distal gray-N, B pelvic (P)-MS, B femur (P)-MS    Neuromuscular Re-education 15 Recip inhib of MS, neural fascia   Therapeutic Activity     Therapeutic Exercises 15 AA/PROM to BLE, pelvis, [id number], ribs, T-spine   Gait training     Modality__________________                Total 55    Blank areas are intentional and mean the treatment did not include these items.       Rudolph Molina  2019

## 2021-05-29 NOTE — TELEPHONE ENCOUNTER
ANTICOAGULATION  MANAGEMENT    Assessment     Today's INR result of 1.60 is Subtherapeutic (goal INR of 2.0-3.0)        Warfarin taken as previously instructed     Lovenox injections have been taken as directed with the exception of this morning. (Patient just picked up Lovenox RX this afternoon. She was using her left-over Lovenox from her recent hospital stay). Patient took Lovenox injection at 2 PM. She will take her next Lovenox injection tomorrow morning to get back on schedule: taking Lovenox injections every 12 hours until INR is therapeutic.    Rapid increase of INR from 1.2 on 6/10/19 and now 1.60 on 6/13/19. Decreasing Warfarin dose 9.5%.    No new diet changes affecting INR    Interaction between Lovenox and warfarin may be affecting INR    Continues to tolerate warfarin with no reported s/s of bleeding or thromboembolism     Previous INR was Subtherapeutic    Plan:     Spoke with Sandy regarding INR result and instructed:     Warfarin Dosing Instructions:  Change warfarin dose to 5 mg daily on TUE / FRI; and 7.5 mg daily rest of week  (9.5 % change). Continue Lovenox injections every 12 hours until INR >2.00.  Instructed patient to follow up no later than: 6/17/19    Education provided: importance of taking warfarin as instructed    Sandy verbalizes understanding and agrees to warfarin dosing plan.    Instructed to call the [facility] for any changes, questions or concerns. (#[phone])   ?   Caitlyn Posey RN    Subjective/Objective:      Sandy Stoutton, a 78 y.o. female is on warfarin.     Sandy reports:     Home warfarin dose: verbally confirmed home dose with Sandy and updated on anticoagulation calendar     Missed doses: Yes: Lovenox AM injection today     Medication changes:  Yes: re-started Lovenox injections on 6/10/19     S/S of bleeding or thromboembolism:  No     New Injury or illness:  No     Changes in diet or alcohol consumption:  No     Upcoming surgery, procedure or cardioversion:   No    Anticoagulation Episode Summary     Current INR goal:   2.0-3.0   TTR:   21.1 % (2.3 mo)   Next INR check:   6/17/2019   INR from last check:   1.60! (6/13/2019)   Weekly max warfarin dose:      Target end date:      INR check location:      Preferred lab:      Send INR reminders to:   [facility]    Indications    Other chronic pulmonary embolism without acute cor pulmonale (H) [I27.82]           Comments:            Anticoagulation Care Providers     Provider Role Specialty Phone number    Caitlyn Rees MD Referring Family Medicine [phone]

## 2021-05-29 NOTE — TELEPHONE ENCOUNTER
INR result is 1.8  INR   Date Value Ref Range Status   05/20/2019 1.90 (!) 0.9 - 1.1 Final       Will the patient be seen, or did they already see, MD or CNP today? No    Most Recent Warfarin dose day/week  Sunday Monday Tuesday Wednesday Thursday Friday Saturday     7.5 5 7.5 5 Missed     Sunday Monday Tuesday Wednesday Thursday Friday Saturday   5 7.5            Has the patient missed any doses of Coumadin, Warfarin, Jantoven in the past 7 days? Yes Missed Saturday's dose.    Has the patients medications changed since the last visit? No    Has the patient experienced any bleeding recently? Yes , had rectal bleeding due to hemorrhoid and constipation, not actively bleeding as of call.     Has the patient experienced any injuries or illness recently? Yes  Constipation recently    Has the patient experienced any 'new' shortness of breath, severe headaches, or changes in vision recently? No    Has the patient had any changes in their diet, or alcohol consumption? No    Is the patient here today to prepare for any type of upcoming surgery, procedure, or for a cardioversion procedure? Yes, has procedure, filter removed from right jugular vein next week.    What phone number can we reach the patient at today? [facility] [phone]
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
## 2021-05-29 NOTE — TELEPHONE ENCOUNTER
Spoke with riaz, recommendation is to have INR therapeutic for one month prior to removing the filter, patient will be informed and I will place a referral once she has been therapeutic for one month.

## 2021-05-29 NOTE — TELEPHONE ENCOUNTER
Provider Communication  Who is calling:  NOMAN Gilmore  Facility in which provider is associated:  [facility]  Reason for call:  Deirdre is requesting a call back from Dr Rees in regards to a procedure that may need to be cancelled tomorrow.  Urgency for return call:  ASAP  Okay to leave detailed message?:  No

## 2021-05-29 NOTE — TELEPHONE ENCOUNTER
Spoke with Sandy.    She reported that she did receive the voicemail regarding INR; there was a delay in the delivery by her phone company.    Sandy verbalized understanding and agreed to Warfarin plan.    Sandy will check her INR on Tuesday 6/25/19 at her office visit with PCP.    Caitlyn Posey, RN, [facility] Anticoagulation Nurse  [phone]

## 2021-05-29 NOTE — TELEPHONE ENCOUNTER
"Called patient back.  Patient reports that IVC filter to be removed yesterday but was cancelled due to INR=1.3. She said they wanted her INR in therapeutic level before removed.  She said that Dr. Floyd told her she wanted it out as soon as possible because it was on the recall list.  She asked if Dr. Floyd would write an order to have her put to sleep to have removed.  She said she had bad experience in past and has been anxious about it the last 4 weeks. I told her Dr. Floyd was out of the office until next Tues. I told her I would talk to her when she returns and give her call back with an update. She verbalized understanding and thanked me for calling.   Message sent to Dr. Floyd/APARNA Martinez RN    Per Dr. Rees's 6/3/19 note:  \"Spoke with riaz, recommendation is to have INR therapeutic for one month prior to removing the filter, patient will be informed and I will place a referral once she has been therapeutic for one month\"/APARNA Martinez RN     6/19/19-Called patient and left message having PCP continue to manage INRs and follow-up with IVC filter removal, per their guidelines. Instructed to call back with questions or concerns/APARNA Martinez RN   "

## 2021-05-29 NOTE — TELEPHONE ENCOUNTER
ANTICOAGULATION  MANAGEMENT    Assessment     Today's INR result of 1.8 is Subtherapeutic (goal INR of 2.0-3.0)        Missed dose(s) may be affecting INR - missed on Saturday    No new diet changes affecting INR    No new medication/supplements affecting INR    Continues to tolerate warfarin with no reported s/s of thromboembolism    Rectal bleeding due to a hemorrhoid and constipation, not actively bleeding at this time    IVC filter removal next week, believes she needs to bridge    Previous INR was Subtherapeutic    Plan:     Spoke with [place] home care nurse, regarding INR result and instructed:     Warfarin Dosing Instructions:  Take a one time boost dose of 10 mg today only then continue current warfarin dose 5 mg daily on Sun, Wed, Fri; and 7.5 mg daily rest of week  (0 % change)    Instructed patient to follow up no later than: 1 week at procedure    Education provided: target INR goal and significance of current INR result    [place] verbalizes understanding and agrees to warfarin dosing plan.    Instructed to call the [facility] for any changes, questions or concerns. (#[phone])   ?   Lorena Reeder RN    Subjective/Objective:      Sandymelania Spencer, a 78 y.o. female is on warfarin.     Sandy reports:     Home warfarin dose: verbally confirmed home dose with [place] and updated on anticoagulation calendar     Missed doses: Yes: Saturday     Medication changes:  No     S/S of bleeding or thromboembolism:  Yes: Yes , had rectal bleeding due to hemorrhoid and constipation, not actively bleeding as of call.      New Injury or illness:  No     Changes in diet or alcohol consumption:  No     Upcoming surgery, procedure or cardioversion:  Yes: IVC filter removal next week    Anticoagulation Episode Summary     Current INR goal:   2.0-3.0   TTR:   27.8 % (1.8 mo)   Next INR check:   6/4/2019   INR from last check:   1.80! (5/28/2019)   Weekly max warfarin dose:      Target end date:      INR check  location:      Preferred lab:      Send INR reminders to:   [facility]    Indications    Other chronic pulmonary embolism without acute cor pulmonale (H) [I27.82]           Comments:            Anticoagulation Care Providers     Provider Role Specialty Phone number    Caitlyn Rees MD Referring Family Medicine [phone]

## 2021-05-29 NOTE — TELEPHONE ENCOUNTER
ANTICOAGULATION  MANAGEMENT    Assessment     Today's INR result of 2.00 is Therapeutic (goal INR of 2.0-3.0)        Warfarin taken as previously instructed     Unable to verify that Lovenox injections were taken as instructed. Patient did not report on template, and did not answer call. Requested a return call if administration was different.    No new diet changes affecting INR    No new medication/supplements affecting INR    Continues to tolerate warfarin with no reported s/s of bleeding or thromboembolism     Previous INR was Subtherapeutic    Plan:     Left a detailed message for Sandy regarding INR result and instructed:     Warfarin Dosing Instructions:  Change warfarin dose to 7.5 mg daily on MON / WED / FRI; and 10 mg daily rest of week  (4.2 % change). Take Lovenox injection should be taken this evening, and then can be discontinued.    Instructed patient to follow up no later than: 3-5 days    Education provided: target INR goal and significance of current INR result    Instructed to call the [facility] for any changes, questions or concerns. (#[phone])   ?   Caitlyn Posey RN    Subjective/Objective:      Sandymelania Sepncer, a 78 y.o. female is on warfarin.     Sandy reports:     Home warfarin dose: as updated on anticoagulation calendar per template     Missed doses: No     Medication changes:  Yes, Patient has been taking Lovenox injections every 12 hours     S/S of bleeding or thromboembolism:  No     New Injury or illness:  No     Changes in diet or alcohol consumption:  No     Upcoming surgery, procedure or cardioversion:  No    Anticoagulation Episode Summary     Current INR goal:   2.0-3.0   TTR:   19.2 % (2.5 mo)   Next INR check:   6/24/2019   INR from last check:   2.00 (6/20/2019)   Weekly max warfarin dose:      Target end date:      INR check location:      Preferred lab:      Send INR reminders to:   [facility]    Indications    Other chronic pulmonary embolism without acute  cor pulmonale (H) [I27.82]           Comments:            Anticoagulation Care Providers     Provider Role Specialty Phone number    Caitlyn Rees MD Referring Family Medicine [phone]

## 2021-05-29 NOTE — PATIENT INSTRUCTIONS - HE
PLAN:    Referral to [facility]     Lamictal 25 mg one daily for pain, mood    Fentanyl 50 mcg film every two days    Return in 8 weeks

## 2021-05-29 NOTE — PROGRESS NOTES
ASSESSMENT/PLAN:       1. Hair loss  -We discussed that I suspect that the hair loss is secondary to her acute anemia from the blood loss.  I am checking a thyroid panel as well as listed below.  Ferritin also.  If her labs are abnormal we will treat accordingly, if her hair loss continues and her labs are normal then we can refer to dermatology for further evaluation.  - Thyroid Stimulating Hormone (TSH)  - T4, Free    2. Pulmonary embolus, right (H)  -Unfortunately her INR continues to decrease.  I am restarting her Lovenox injections.  Additionally we spent a significant amount of time discussing consistent vitamin K intake as she has been limiting this as much as she can which I suspect is causing some of the issues with the INR lability.  Discussed that if she would like to have an sure, she should just do this daily and this will help us make sure that her levels are staying stable.  Additionally if there is a day where she does not want to ensure then she should eat a comparable amount of green vegetables.  She will try to be more consistent with this.  If she continues to struggle I think we should consider starting her on a daily Viactiv chew.  - enoxaparin (LOVENOX) 60 mg/0.6 mL syringe; Inject 0.6 mL (60 mg total) under the skin every 12 (twelve) hours.  Dispense: 10 Syringe; Refill: 0    3. Anemia due to blood loss, acute  -Updating labs as listed below, she is continued to improve.  I suspect that with her poor nutrition however she will continue to struggle with this.  - Ferritin  - Iron and Transferrin Iron Binding Capacity  - HM1(CBC and Differential)  - HM1 (CBC with Diff)    4. Chronic kidney disease (CKD) stage G3a/A1, moderately decreased glomerular filtration rate (GFR) between 45-59 mL/min/1.73 square meter and albuminuria creatinine ratio less than 30 mg/g (H)  -stable, no symptoms    5. Cluster headache, not intractable, unspecified chronicity pattern  -Feeling that things are relatively  stable.     6. Mixed hyperlipidemia  -Updating labs today, otherwise doing well on the statin  - Lipid Cascade  - Comprehensive Metabolic Panel      Return in about 3 months (around 9/10/2019) for recheck.    30 minutes was spent face-to-face with the patient during this encounter and >50% of this was spent on counseling and coordination of care. We discussed in depth the topics listed above.       Caitlyn Rees MD      PROGRESS NOTE   6/10/2019    SUBJECTIVE:  Sandy Spencer is a 78 y.o. female  who presents for follow up.    She has noted increased hair loss in the last few months. She does have this especially where she is having her headaches. This has been the last few weeks. It's not in need of sumitriptan. She feels that this is more sinus and being in air conditioning rather than migraines. She is hoarse.     She did forget her warfarin two times over the weekend. She was taking medication four times in the morning. Is going to change this to her levothyroxine in the morning and then everything else later on and does have a plan now.     She has been having aching in the left back, just above the waist. She is not sure what this is. She has been seeing Dr. Ramirez and getting adjustments. This is coming and going. It has burned, sometimes it aches. She does have a nauseating feeling off and on as well. Not always the same type of pain. She has been taking the ensure off and on. She does know this can affect the INR.     She has been wearing heavy shoes, not sure if this is a pound of weight.   Chief Complaint   Patient presents with     Headache     Patient is here today for a one month follow up in regards to the cluster headaches. Patient is still having headaches, feels maybe they are getting worse.      Weight Check     Patient is here today for a one month follow up in regards to starting ensure for weight loss.      Alopecia     Patient wonders if she should be on hormones due to hair  loss. Recently it has been coming out in clumps.          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     Temporomandibular joint-pain-dysfunction syndrome (TMJ)     Pancreatitis     Localized swelling of lower leg     Acute right-sided low back pain without sciatica     Acquired hypothyroidism     Chronic pain syndrome     Snoring     Diarrhea     Abdominal pain, generalized     Dermatochalasis of left eyelid     Bilateral carotid artery stenosis     Coronary artery disease involving native coronary artery of native heart without angina pectoris     Sinus bradycardia     Other chronic pulmonary embolism without acute cor pulmonale (H)     Splenic infarction     Opioid type dependence, continuous (H)     Hematuria     Anemia due to blood loss, acute     Hydronephrosis     Bladder spasms     Hypotension, unspecified hypotension type     Acute reaction to stress     Anxiety disorder due to medical condition     Family relationship problem     History of posttraumatic stress disorder (PTSD)     Generalized muscle weakness       Current Outpatient Medications   Medication Sig Dispense Refill     acetaminophen (TYLENOL) 500 MG tablet Take 650 mg by mouth 3 (three) times a day.              azelastine (ASTEPRO) 0.15 % (205.5 mcg) Spry nasal spray Apply 1 spray into each nostril 2 (two) times a day as needed. 30 mL 11     calcium, as carbonate, (TUMS) 200 mg calcium (500  mg) chewable tablet Chew 1 tablet (200 mg total) 3 (three) times a day as needed. 30 tablet 0     cholecalciferol, vitamin D3, 5,000 unit Tab Take 1 tablet by mouth daily with supper.              cyanocobalamin/mecobalamin (CYANOCOBALAMIN-METHYLCOBALAMIN SL) Place 5,000 mcg under the tongue daily.       diclofenac sodium (VOLTAREN) 1 % Gel Apply 4 g topically. (apply to knees Q AM and Q 2pm and prn.  May self-administer)       diphenoxylate-atropine (LOMOTIL) 2.5-0.025 mg per tablet Take 1 tablet by mouth 4 (four) times a day as needed for diarrhea. 30 tablet 1     fentaNYL (DURAGESIC) 50 mcg/hr Place 1 patch on the skin every other day. 15 patch 0     ferrous sulfate 65 mg elemental iron Take 1 tablet by mouth daily with breakfast.       furosemide (LASIX) 20 MG tablet Take by mouth. (every other day PRN for weight gain of 3# in 24 hours or SBP >180)       GENERLAC 10 gram/15 mL solution TK 30ML PO QD (Patient taking differently: Take 30 mL by mouth daily as needed. TK 30ML PO QD      ) 236 mL 3     hydrOXYzine HCl (ATARAX) 10 MG tablet Take 1 tablet (10 mg total) by mouth 3 (three) times a day. 90 tablet 1     KRILL OIL ORAL Take 350 mg by mouth daily.       Lactobacillus acidoph-L.bulgar (FLORANEX) 1 million cell Tab tablet Take 1 tablet by mouth daily. 30 tablet 0     lamoTRIgine (LAMICTAL) 25 MG tablet Take 1 tablet (25 mg total) by mouth daily. 90 tablet 2     lamoTRIgine (LAMICTAL) 5 MG TChD Take 1 tablet by mouth 2 (two) times a day.  0     levothyroxine (LEVO-T) 50 MCG tablet Take 1 tablet (50 mcg total) by mouth Daily at 6:00 am. 30 tablet 3     loperamide (IMODIUM) 2 mg capsule Take 2 mg by mouth 4 (four) times a day as needed for diarrhea .        1     morphine (MSIR) 15 MG tablet Take 0.5 tablets (7.5 mg total) by mouth 2 (two) times a day as needed for pain. 28 tablet 0     naloxone (NARCAN) 4 mg/actuation nasal spray 1 spray (4 mg dose) into one nostril for opioid reversal. Call 911. May repeat if  no response in 3 minutes. 1 Box 0     promethazine (PHENERGAN) 12.5 MG tablet Take 1 tablet (12.5 mg total) by mouth every 6 (six) hours as needed for nausea. 20 tablet 1     rosuvastatin (CRESTOR) 40 MG tablet Take 1 tablet (40 mg total) by mouth daily. 30 tablet 3     senna-docusate (PERICOLACE) 8.6-50 mg tablet Take 2 tablets by mouth daily as needed. 30 tablet 0     SUMAtriptan (IMITREX) 50 MG tablet Take 1 tablet (50 mg total) by mouth every 2 (two) hours as needed for migraine. 27 tablet 1     topiramate (TOPAMAX) 50 MG tablet Take 1 tablet (50 mg total) by mouth 2 (two) times a day. 180 tablet 1     traZODone (DESYREL) 100 MG tablet TAKE 2 TO 4 TABLETS(200  MG) BY MOUTH AT BEDTIME AS NEEDED FOR SLEEP 360 tablet 0     warfarin (COUMADIN/JANTOVEN) 5 MG tablet Take one to two tablets (5 to 10 mg) by mouth daily. Adjust dose based on INR results as directed. 180 tablet 1     cyanocobalamin, vitamin B-12, 5,000 mcg Subl Take 1 tablet by mouth.       enoxaparin (LOVENOX) 60 mg/0.6 mL syringe Inject 0.6 mL (60 mg total) under the skin every 12 (twelve) hours. 10 Syringe 0     krill-omega-3-dha-epa-lipids (KRILL OIL) 483-50-55-50 mg cap Take 1 capsule by mouth.       Current Facility-Administered Medications   Medication Dose Route Frequency Provider Last Rate Last Dose     denosumab 60 mg (PROLIA 60 mg/ml)  60 mg Subcutaneous Q6 Months Andrei Olivera MD   60 mg at 19 1318       Social History     Tobacco Use   Smoking Status Former Smoker     Packs/day: 1.00     Years: 20.00     Pack years: 20.00     Last attempt to quit: 2000     Years since quittin.4   Smokeless Tobacco Never Used           OBJECTIVE:        Recent Results (from the past 240 hour(s))   INR   Result Value Ref Range    INR 1.20 (!) 0.9 - 1.1   Thyroid Stimulating Hormone (TSH)   Result Value Ref Range    TSH 0.44 0.30 - 5.00 uIU/mL   Lipid Cascade   Result Value Ref Range    Cholesterol 183 <=199 mg/dL    Triglycerides 76  <=149 mg/dL    HDL Cholesterol 69 >=50 mg/dL    LDL Calculated 99 <=129 mg/dL    Patient Fasting > 8hrs? Unknown    Comprehensive Metabolic Panel   Result Value Ref Range    Sodium 135 (L) 136 - 145 mmol/L    Potassium 4.0 3.5 - 5.0 mmol/L    Chloride 107 98 - 107 mmol/L    CO2 21 (L) 22 - 31 mmol/L    Anion Gap, Calculation 7 5 - 18 mmol/L    Glucose 98 70 - 125 mg/dL    BUN 17 8 - 28 mg/dL    Creatinine 1.31 (H) 0.60 - 1.10 mg/dL    GFR MDRD Af Amer 48 (L) >60 mL/min/1.73m2    GFR MDRD Non Af Amer 39 (L) >60 mL/min/1.73m2    Bilirubin, Total 0.4 0.0 - 1.0 mg/dL    Calcium 8.9 8.5 - 10.5 mg/dL    Protein, Total 6.3 6.0 - 8.0 g/dL    Albumin 3.8 3.5 - 5.0 g/dL    Alkaline Phosphatase 41 (L) 45 - 120 U/L    AST 15 0 - 40 U/L    ALT <9 0 - 45 U/L   Ferritin   Result Value Ref Range    Ferritin 187 (H) 10 - 130 ng/mL   Iron and Transferrin Iron Binding Capacity   Result Value Ref Range    Iron 74 42 - 175 ug/dL    Transferrin 174 (L) 212 - 360 mg/dL    Transferrin Saturation, Calculated 34 20 - 50 %    Transferrin IBC, Calculated 217 (L) 313 - 563 ug/dL   T4, Free   Result Value Ref Range    Free T4 1.2 0.7 - 1.8 ng/dL   HM1 (CBC with Diff)   Result Value Ref Range    WBC 3.6 (L) 4.0 - 11.0 thou/uL    RBC 3.57 (L) 3.80 - 5.40 mill/uL    Hemoglobin 11.4 (L) 12.0 - 16.0 g/dL    Hematocrit 34.3 (L) 35.0 - 47.0 %    MCV 96 80 - 100 fL    MCH 31.8 27.0 - 34.0 pg    MCHC 33.1 32.0 - 36.0 g/dL    RDW 13.0 11.0 - 14.5 %    Platelets 144 140 - 440 thou/uL    MPV 7.8 7.0 - 10.0 fL    Neutrophils % 58 50 - 70 %    Lymphocytes % 31 20 - 40 %    Monocytes % 10 2 - 10 %    Eosinophils % 2 0 - 6 %    Basophils % 0 0 - 2 %    Neutrophils Absolute 2.1 2.0 - 7.7 thou/uL    Lymphocytes Absolute 1.1 0.8 - 4.4 thou/uL    Monocytes Absolute 0.3 0.0 - 0.9 thou/uL    Eosinophils Absolute 0.1 0.0 - 0.4 thou/uL    Basophils Absolute 0.0 0.0 - 0.2 thou/uL       Vitals:    06/10/19 1202   BP: 136/66   Patient Site: Right Arm   Patient Position:  Sitting   Cuff Size: Adult Regular   Pulse: 66   SpO2: 99%   Weight: 125 lb 4.8 oz (56.8 kg)     Weight: 125 lb (56.7 kg)          Physical Exam:  GENERAL APPEARANCE: A&A, NAD, well hydrated, well nourished  SKIN:  Normal skin turgor, no lesions/rashes, scalp shows a small barrier patch on her left occiput otherwise than hair just in general  HEENT: moist mucous membranes, no rhinorrhea  NECK: Normal  CV: RRR, no M/G/R   LUNGS: CTAB  ABDOMEN: S&NT, no masses   EXTREMITY: no edema   NEURO: no gross deficits   Psych: Her affect is appropriate, she is casually dressed and groomed, her thought process and speech pattern are normal

## 2021-05-29 NOTE — TELEPHONE ENCOUNTER
Provider Review: Anticoagulation Annual Referral Renewal    ACM Renewal Decision:  Renew ACM warfarin management      INR Range:   Change INR goal range to 2.0-3.0   Anticipated Duration of Therapy (from today):  Long-term anticoagulation      Caitlyn Rees MD  1:22 PM

## 2021-05-29 NOTE — TELEPHONE ENCOUNTER
INR result is 1.3  INR   Date Value Ref Range Status   05/28/2019 1.80 (!) 0.9 - 1.1 Final       Will the patient be seen, or did they already see, MD or CNP today? No    Most Recent Warfarin dose day/week  Sunday Monday Tuesday Wednesday Thursday Friday Saturday     10 5 7.5 5 Missed Dose     Sunday Monday Tuesday Wednesday Thursday Friday Saturday   7.5             Has the patient missed any doses of Coumadin, Warfarin, Jantoven in the past 7 days? Yes Missed 5mg on Saturday 6/2.     Has the patients medications changed since the last visit? Yes lamoTRIgine (LAMICTAL) 25 MG tablet changed to once a day 25mg instead of 5mg 3 times a day  Has the patient experienced any bleeding recently? No    Has the patient experienced any injuries or illness recently? No    Has the patient experienced any 'new' shortness of breath, severe headaches, or changes in vision recently? No    Has the patient had any changes in their diet, or alcohol consumption? No    Is the patient here today to prepare for any type of upcoming surgery, procedure, or for a cardioversion procedure? Yes, surgery on 6/4 IVC filter removed.     What phone number can we reach the patient at today? [phone] [facility] nurse [place].

## 2021-05-29 NOTE — TELEPHONE ENCOUNTER
I did have her restart her lovenox today, she will recheck on Thursday and we can re-evaluate.    Thanks

## 2021-05-29 NOTE — TELEPHONE ENCOUNTER
FYI - Status Update  Who is Calling: Patient  Update: Patient had been given INR dosing because of an procedure that was scheduled for tomorrow 6/04/19. That procedure has been canceled. Please call patient to discuss what her dosing should be with no procedure scheduled.  Okay to leave a detailed message?:  No

## 2021-05-29 NOTE — TELEPHONE ENCOUNTER
"Patient calls to request referral for pool therapy:  \"I have been waiting for the [facility] to get this referral\"  Chart review:  From last office visit with [facility]:  Referral to [facility] Pool Therapy    Will fax a referral to location provided:  [phone]  "

## 2021-05-29 NOTE — TELEPHONE ENCOUNTER
PCP, patient is having IVC filter removal next week on 6/4/19.  Home care nurse reports patient is supposed to bridge.  I do not not see hold/bridge orders in chart.    Please advise warfarin hold/bridge orders for upcoming procedure.
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
## 2021-05-29 NOTE — PROGRESS NOTES
Assessment/Plan:     Problem List Items Addressed This Visit     RSD upper limb, right    Relevant Medications    lamoTRIgine (LAMICTAL) 25 MG tablet    fentaNYL (DURAGESIC) 50 mcg/hr    Chronic pain syndrome - Primary              No follow-ups on file.    Patient Instructions   PLAN:    Referral to [facility]     Lamictal 25 mg one daily for pain, mood    Fentanyl 50 mcg film every two days    Return in 8 weeks        Subjective:       78 y.o. female presents for evaluation of multiple pain generators including lower extremity chronic regional pain syndrome, migraine headaches.    She reviews will be having her carotid filter removed next week.  She states there is a recall for the day after was placed.    She produces a bottle of morphine.  She notes the home visiting nurse had requested she take a dose when visited and found her blood pressure was 160.  However she does not want to continue to take more morphine and states she will save the bottle case there is emergency but will she has been using the fentanyl 50 mcg patch, does not want to continue the 12 mcg patch she can reviewing the cost.    She produces a bottle of lamotrigine 5 mg 3 times a day.  She would like a longer refill.  We are using this both to help with pain and depression anxiety.  She thinks there may be some room to go up and she is tolerating this.    She does note that on Mother's Day she saw her children at a [place] and had some gifts, she has been socializing with her daughter.  She thinks the fact that she almost  is been helpful and things have changed.    She has been work with Dr. Gonsalez at the spine center having some osteopathic manipulation.  She feels she is done better when she has had access to a pool therapy in the [facility].  She had gone to these in [place], and is been to the [facility] and would like to return there as she has a friend who also goes.    She notes her hemoglobin had been as low  as 5, reviewed the last level couple weeks ago was 11.4.  She thinks she is improving some in terms of her energy.  She wonders about her renal function.      Current Outpatient Medications:      fentaNYL (DURAGESIC) 50 mcg/hr, Place 1 patch on the skin every other day., Disp: 15 patch, Rfl: 0     GENERLAC 10 gram/15 mL solution, TK 30ML PO QD (Patient taking differently: Take 30 mL by mouth daily as needed. TK 30ML PO QD   ), Disp: 236 mL, Rfl: 3     lamoTRIgine (LAMICTAL) 5 MG TChD, Take 1 tablet by mouth 2 (two) times a day., Disp: , Rfl: 0     naloxone (NARCAN) 4 mg/actuation nasal spray, 1 spray (4 mg dose) into one nostril for opioid reversal. Call 911. May repeat if no response in 3 minutes., Disp: 1 Box, Rfl: 0     acetaminophen (TYLENOL) 500 MG tablet, Take 650 mg by mouth 3 (three) times a day.    , Disp: , Rfl:      azelastine (ASTEPRO) 0.15 % (205.5 mcg) Spry nasal spray, Apply 1 spray into each nostril 2 (two) times a day as needed., Disp: 30 mL, Rfl: 11     calcium, as carbonate, (TUMS) 200 mg calcium (500 mg) chewable tablet, Chew 1 tablet (200 mg total) 3 (three) times a day as needed., Disp: 30 tablet, Rfl: 0     cholecalciferol, vitamin D3, 5,000 unit Tab, Take 1 tablet by mouth daily with supper.    , Disp: , Rfl:      cyanocobalamin/mecobalamin (CYANOCOBALAMIN-METHYLCOBALAMIN SL), Place 5,000 mcg under the tongue daily., Disp: , Rfl:      diclofenac sodium (VOLTAREN) 1 % Gel, Apply 4 g topically. (apply to knees Q AM and Q 2pm and prn.  May self-administer), Disp: , Rfl:      diphenoxylate-atropine (LOMOTIL) 2.5-0.025 mg per tablet, Take 1 tablet by mouth 4 (four) times a day as needed for diarrhea., Disp: 30 tablet, Rfl: 1     ferrous sulfate 65 mg elemental iron, Take 1 tablet by mouth daily with breakfast., Disp: , Rfl:      food supplemt, lactose-reduced (ENSURE ORIGINAL) Liqd, Take 237 mL by mouth daily., Disp: 7110 mL, Rfl: 11     furosemide (LASIX) 20 MG tablet, Take by mouth. (every other  day PRN for weight gain of 3# in 24 hours or SBP >180), Disp: , Rfl:      hydrOXYzine HCl (ATARAX) 10 MG tablet, Take 1 tablet (10 mg total) by mouth 3 (three) times a day., Disp: 90 tablet, Rfl: 1     KRILL OIL ORAL, Take 350 mg by mouth daily., Disp: , Rfl:      Lactobacillus acidoph-L.bulgar (FLORANEX) 1 million cell Tab tablet, Take 1 tablet by mouth daily., Disp: 30 tablet, Rfl: 0     lamoTRIgine (LAMICTAL) 25 MG tablet, Take 1 tablet (25 mg total) by mouth daily., Disp: 90 tablet, Rfl: 2     levothyroxine (LEVO-T) 50 MCG tablet, Take 1 tablet (50 mcg total) by mouth Daily at 6:00 am., Disp: 30 tablet, Rfl: 3     loperamide (IMODIUM) 2 mg capsule, Take 2 mg by mouth 4 (four) times a day as needed for diarrhea .   , Disp: , Rfl: 1     morphine (MSIR) 15 MG tablet, Take 0.5 tablets (7.5 mg total) by mouth 2 (two) times a day as needed for pain., Disp: 28 tablet, Rfl: 0     promethazine (PHENERGAN) 12.5 MG tablet, Take 1 tablet (12.5 mg total) by mouth every 6 (six) hours as needed for nausea., Disp: 20 tablet, Rfl: 1     rosuvastatin (CRESTOR) 40 MG tablet, Take 1 tablet (40 mg total) by mouth daily., Disp: 30 tablet, Rfl: 3     senna-docusate (PERICOLACE) 8.6-50 mg tablet, Take 2 tablets by mouth daily as needed., Disp: 30 tablet, Rfl: 0     SUMAtriptan (IMITREX) 50 MG tablet, Take 1 tablet (50 mg total) by mouth every 2 (two) hours as needed for migraine., Disp: 27 tablet, Rfl: 1     topiramate (TOPAMAX) 50 MG tablet, Take 1 tablet (50 mg total) by mouth 2 (two) times a day., Disp: 180 tablet, Rfl: 1     traZODone (DESYREL) 100 MG tablet, TAKE 2 TO 4 TABLETS(200  MG) BY MOUTH AT BEDTIME AS NEEDED FOR SLEEP, Disp: 360 tablet, Rfl: 0     warfarin (COUMADIN/JANTOVEN) 5 MG tablet, Take one to two tablets (5 to 10 mg) by mouth daily. Adjust dose based on INR results as directed., Disp: 180 tablet, Rfl: 1    Current Facility-Administered Medications:      denosumab 60 mg (PROLIA 60 mg/ml), 60 mg, Subcutaneous,  "Q6 Months, Andrei Olivera MD, 60 mg at 04/05/19 1318  She is alert with a clear sensorium appropriately groomed.  Much brighter affect and appears much more composed than when last seen.  She brightens that she describes contact with her family.  Reviewed that is been a element that is been a factor since I have been working with her.         Objective:     Vitals:    05/31/19 1311   BP: 137/65   Pulse: 75   Weight: 122 lb (55.3 kg)   Height: 5' 2\" (1.575 m)   PainSc:   7   PainLoc: Knee       Discussed the plan to increase the lamotrigine to 25 mg daily for target symptoms of pain, mood and anxiety, observing for any side effects.    Time spent more than 15 minutes face-to-face, more than 50% count about above condition and coordination treatment plan, including target symptoms and adjustment of the lamotrigine          This note has been dictated using voice recognition software. Any grammatical or context distortions are unintentional and inherent to the software  "

## 2021-05-30 VITALS — BODY MASS INDEX: 29.02 KG/M2 | WEIGHT: 170 LBS | HEIGHT: 64 IN

## 2021-05-30 VITALS
WEIGHT: 177 LBS | BODY MASS INDEX: 30.22 KG/M2 | BODY MASS INDEX: 29.19 KG/M2 | HEIGHT: 64 IN | HEIGHT: 64 IN | WEIGHT: 171 LBS

## 2021-05-30 VITALS — WEIGHT: 171 LBS | HEIGHT: 64 IN | BODY MASS INDEX: 29.19 KG/M2

## 2021-05-30 VITALS — BODY MASS INDEX: 28.68 KG/M2 | WEIGHT: 168 LBS | HEIGHT: 64 IN

## 2021-05-30 VITALS — BODY MASS INDEX: 29.19 KG/M2 | HEIGHT: 64 IN | WEIGHT: 171 LBS

## 2021-05-30 VITALS — HEIGHT: 64 IN | WEIGHT: 163 LBS | BODY MASS INDEX: 27.83 KG/M2

## 2021-05-30 VITALS — BODY MASS INDEX: 27.31 KG/M2 | HEIGHT: 64 IN | WEIGHT: 160 LBS

## 2021-05-30 VITALS — WEIGHT: 163 LBS | HEIGHT: 64 IN | BODY MASS INDEX: 27.83 KG/M2

## 2021-05-30 VITALS — HEIGHT: 64 IN | BODY MASS INDEX: 29.37 KG/M2 | WEIGHT: 172 LBS

## 2021-05-30 NOTE — PROGRESS NOTES
[facility] Daily Progress     Patient Name: Sandy Spencer  Date: 6/28/2019  Visit #: 3  PTA visit #:     Referral Diagnosis: B knee pain  Referring provider: Michael Ramirez DO  Visit Diagnosis:     ICD-10-CM    1. Chronic pain syndrome G89.4    2. Knee pain, chronic M25.569     G89.29    3. Acute right-sided low back pain without sciatica M54.5    4. Chronic pain of both knees M25.561     M25.562     G89.29    5. Localized swelling of lower leg R22.40    6. Physical deconditioning R53.81          Assessment:     Patient is benefitting from skilled physical therapy and is making steady progress toward functional goals.  Patient is appropriate to continue with skilled physical therapy intervention, as indicated by initial plan of care.   She did have improvement in her fascial tensions with treatment. This should help to improve sinus drainage and decrease HA's.     Goal Status:  Pt. will demonstrate/verbalize independence in self-management of condition in : 12 weeks  Pt. will be independent with home exercise program in : 12 weeks  Pt. will have improved quality of sleep: with less pain;waking less times/night;in 6 weeks  Pt. will show improved balance for safer : ambulation;standing;for dressing/grooming;for chores;for community ambulation;for exercise/recreation;independently;in 12 weeks  Pt. will be able to walk : 30 minutes;on uneven/inclined surfaces;1 mile;with less pain;with less difficulty;for community mobility;for exercise/recreation;in 12 weeks  Pt. will bend: to dress;to clean;with less pain;with less difficulty;for self care;for exercise/recreation;in 12 weeks  Patient will ascend / descend: stairs;step;curb;without railing;with recipricol gait;without assistive device;independently;with less pain;with less difficulty;in 12 weeks  Patient will sit: 60 minutes;for driving;for watching TV;for other activity;with less pain;with less difficultty;in 12 weeks  Patient will transfer:  sit/stand;supine/sit;floor/stand;for toileting;for in/out of bed;for in/out of chair;for other activity;with less pain;with less difficulty;in 12 weeks    No data recorded    Plan / Patient Education:     Plan to con't with manual therapy to decrease fascial tension/tone to normalize ROM/decrease inflammation/decrease mm tone and improve proprioception.      Subjective:     Pain Ratin    She has had a sinus HA lately. She does have some fluid in her ear as well. There has been a migraine for most of the week.   The left leg was fine after the last Rx. The right leg was still quite painful.     Objective:     LV, neural fascial tensions.     Treatment Today   2019   TREATMENT MINUTES COMMENTS   Evaluation     Self-care/ Home management     Manual therapy 25 Fascial release using Strain-Counterstrain of B cranial dura-N, B cranial/cervical/sternal-LV, C0 EPI-LV   Neuromuscular Re-education 15 Recip inhib of LV, neural fascia   Therapeutic Activity     Therapeutic Exercises 15 AA/PROM to C-T spine, ribs, cranium.    Gait training     Modality__________________                Total 55    Blank areas are intentional and mean the treatment did not include these items.       Rudolph Molina  2019

## 2021-05-30 NOTE — PATIENT INSTRUCTIONS - HE
PLAN:     Discussed you will use med for overflow diarrhea    Discussed electromagnetic therapy for morales/rib facia pain that may be causing GI symptoms, call Dr. Kenneth Thomas [phone]    Return in 8 weeks

## 2021-05-30 NOTE — PROGRESS NOTES
[facility] Daily Progress     Patient Name: Sandy Spencer  Date: 7/17/2019  Visit #: 7       Referral Diagnosis: B knee pain  Referring provider: Michael Ramirez DO  Visit Diagnosis: R LE pain,  Congestion   L ear drainage difficulty    ICD-10-CM    1. Localized edema R60.0    2. Acute right-sided low back pain without sciatica M54.5    3. Abdominal pain, generalized R10.84    4. Complex regional pain syndrome type 1 of both lower extremities G90.523    5. Chronic pain of right knee M25.561     G89.29    6. Chronic pain syndrome G89.4          Assessment:     Patient is benefitting from skilled physical therapy and is making steady progress toward functional goals.  Patient is appropriate to continue with skilled physical therapy intervention, as indicated by initial plan of care.   She did have improvement in her fascial tensions with treatment. This should help to improve sinus drainage and decrease HA's.     Goal Status:  Pt. will demonstrate/verbalize independence in self-management of condition in : 12 weeks  Pt. will be independent with home exercise program in : 12 weeks  Pt. will have improved quality of sleep: with less pain;waking less times/night;in 6 weeks  Pt. will show improved balance for safer : ambulation;standing;for dressing/grooming;for chores;for community ambulation;for exercise/recreation;independently;in 12 weeks  Pt. will be able to walk : 30 minutes;on uneven/inclined surfaces;1 mile;with less pain;with less difficulty;for community mobility;for exercise/recreation;in 12 weeks  Pt. will bend: to dress;to clean;with less pain;with less difficulty;for self care;for exercise/recreation;in 12 weeks  Patient will ascend / descend: stairs;step;curb;without railing;with recipricol gait;without assistive device;independently;with less pain;with less difficulty;in 12 weeks  Patient will sit: 60 minutes;for driving;for watching TV;for other activity;with less pain;with less  difficultty;in 12 weeks  Patient will transfer: sit/stand;supine/sit;floor/stand;for toileting;for in/out of bed;for in/out of chair;for other activity;with less pain;with less difficulty;in 12 weeks    No data recorded    Plan / Patient Education:     Plan to con't with manual therapy to decrease fascial tension/tone to normalize ROM/decrease inflammation/decrease mm tone and improve proprioception.      Subjective:     Pain Ratin      R LE pain has returned after removal of the tape   Pain butch from 2/10 to 6/10    Objective:     + Scan  Lymphatics  Lymphatic flow reduced below the malleoli  3-4sec / cycle    Treatment Today   2019   TREATMENT MINUTES COMMENTS   Evaluation     Self-care/ Home management 30 MFR with OA, L5-SI and SI joint releases, Dural Tube Pull.   SCS to Lymphatics of the L LE,   SCS to SAPH-LV bilat   R Femoral Vein,  L PTIB-LV,   FIB-LV,  ATIB-LV,      Manual therapy     Neuromuscular Re-education 30 Elastic Taping of bilat Sciatic nerves,  L5-S1    Pt responded well to 1st sciatic nerve taping, wearing it for 4 days with reduction in pain 6/10 to 0-2/10.  Todays taping was observed and videoed by her friend for home taping.  Pain levels reduced to the R LE 6/10 to 0/10  Both LE's were taped to determine effect of the taping on her Low Back Pain   Therapeutic Activity     Therapeutic Exercises     Gait training     Modality__________________                Total 60    Blank areas are intentional and mean the treatment did not include these items.       Yogi Velazquez  2019

## 2021-05-30 NOTE — TELEPHONE ENCOUNTER
ANTICOAGULATION  MANAGEMENT    Assessment     Today's INR result of 2.1 is Therapeutic (goal INR of 2.0-3.0)        Warfarin taken as previously instructed    Increased greens/vitamin K intake may be affecting INR, she will go back to her usual amount    No new medication/supplements affecting INR    Continues to tolerate warfarin with no reported s/s of bleeding or thromboembolism     Previous INR was Supratherapeutic, likely d/t GI issues. This has resolved.    Plan:     Spoke with Sandy regarding INR result and instructed:     Warfarin Dosing Instructions:  Continue current warfarin dose    7.5 mg every Mon, Thu, Sat; 10 mg all other days         Instructed patient to follow up no later than: 1 week at her OV    Education provided: impact of vitamin K foods on INR and target INR goal and significance of current INR result    Sandy verbalizes understanding and agrees to warfarin dosing plan.    Instructed to call the [facility] for any changes, questions or concerns. (#[phone])   ?   Shey Tilley RN    Subjective/Objective:      Sandy Spencer, a 78 y.o. female is on warfarin.     Sandy reports:     Home warfarin dose: as updated on anticoagulation calendar per template     Missed doses: No     Medication changes:  No     S/S of bleeding or thromboembolism:  No     New Injury or illness:  No     Changes in diet or alcohol consumption:  She did increase her Vit K intake after her last INR in an effort to get it down. Now will go back to normal intake     Upcoming surgery, procedure or cardioversion:  No    Anticoagulation Episode Summary     Current INR goal:   2.0-3.0   TTR:   27.6 % (3.8 mo)   Next INR check:   8/5/2019   INR from last check:   2.10 (7/29/2019)   Weekly max warfarin dose:      Target end date:      INR check location:      Preferred lab:      Send INR reminders to:   [facility] [place]    Indications    Other chronic pulmonary embolism without acute cor pulmonale (H) [I27.82]            Comments:            Anticoagulation Care Providers     Provider Role Specialty Phone number    Caitlyn Rees MD Referring Family Medicine [phone]

## 2021-05-30 NOTE — TELEPHONE ENCOUNTER
Patient Returning Call  Reason for call:  Return call  Information relayed to patient:  Patient was informed of her lab and CT results below.  Patient has additional questions:  Yes  If YES, what are your questions/concerns:  Patient would like her results mailed to her.  Okay to leave a detailed message?: No call back needed

## 2021-05-30 NOTE — PROGRESS NOTES
[facility] Daily Progress     Patient Name: Sandy Spencer  Date: 7/3/2019  Visit #: 4       Referral Diagnosis: B knee pain  Referring provider: Michael Ramirez DO  Visit Diagnosis: R LE pain,  Congestion   L ear drainage difficulty    ICD-10-CM    1. Cervicalgia M54.2    2. Arthralgia of right lower leg M25.561    3. Localized edema R60.0    4. Diffuse myofascial pain syndrome M79.18    5. Chronic pain disorder G89.4          Assessment:     Patient is benefitting from skilled physical therapy and is making steady progress toward functional goals.  Patient is appropriate to continue with skilled physical therapy intervention, as indicated by initial plan of care.   She did have improvement in her fascial tensions with treatment. This should help to improve sinus drainage and decrease HA's.     Goal Status:  Pt. will demonstrate/verbalize independence in self-management of condition in : 12 weeks  Pt. will be independent with home exercise program in : 12 weeks  Pt. will have improved quality of sleep: with less pain;waking less times/night;in 6 weeks  Pt. will show improved balance for safer : ambulation;standing;for dressing/grooming;for chores;for community ambulation;for exercise/recreation;independently;in 12 weeks  Pt. will be able to walk : 30 minutes;on uneven/inclined surfaces;1 mile;with less pain;with less difficulty;for community mobility;for exercise/recreation;in 12 weeks  Pt. will bend: to dress;to clean;with less pain;with less difficulty;for self care;for exercise/recreation;in 12 weeks  Patient will ascend / descend: stairs;step;curb;without railing;with recipricol gait;without assistive device;independently;with less pain;with less difficulty;in 12 weeks  Patient will sit: 60 minutes;for driving;for watching TV;for other activity;with less pain;with less difficultty;in 12 weeks  Patient will transfer: sit/stand;supine/sit;floor/stand;for toileting;for in/out of bed;for in/out of  chair;for other activity;with less pain;with less difficulty;in 12 weeks    No data recorded    Plan / Patient Education:     Plan to con't with manual therapy to decrease fascial tension/tone to normalize ROM/decrease inflammation/decrease mm tone and improve proprioception.      Subjective:     Pain Ratin      The left leg was fine after the last Rx. The right leg was still quite painful. RSD issue     Objective:     + Scan   Post Neuro  Pelvis,   Venous R LE.   Lig R Pelvis,   Periostial R Plvis   sacum     Treatment Today   7/3/2019   TREATMENT MINUTES COMMENTS   Evaluation     Self-care/ Home management 60 SCS to R SAPH-LV,   SCS to right BST-P R,  CX4-P R,   ILIF-P,  ILFO-P,    ISCHO-P,   FVAR-P L,   FVAL-P,   FEME-P,   PFIM-P,   PFOM-P,       RSD symptom of cold decreasing.   Pt states the lower leg feels warmer and less painful  0/10   Feels normal   Manual therapy     Neuromuscular Re-education     Therapeutic Activity     Therapeutic Exercises     Gait training     Modality__________________                Total 60    Blank areas are intentional and mean the treatment did not include these items.       Yogi Velazquez  7/3/2019

## 2021-05-30 NOTE — TELEPHONE ENCOUNTER
I call and left a message asking for the patient to call back so we could go over her scan and test results.     Please relay the below results to the patient when she calls back.    Her CT scan of her chest, abdomen are normal without any issues. There is no obvious reason for her pain, so for now we should watch this.

## 2021-05-30 NOTE — PROGRESS NOTES
[facility] Daily Progress     Patient Name: Sandy Spencer  Date: 7/10/2019  Visit #: 5       Referral Diagnosis: B knee pain  Referring provider: Michael Ramirez DO  Visit Diagnosis: R LE pain,  Congestion   L ear drainage difficulty    ICD-10-CM    1. Localized edema R60.0    2. Acute right-sided low back pain without sciatica M54.5    3. Abdominal pain, generalized R10.84    4. Complex regional pain syndrome type 1 of both lower extremities G90.523    5. Chronic pain of right knee M25.561     G89.29    6. Chronic pain syndrome G89.4          Assessment:     Patient is benefitting from skilled physical therapy and is making steady progress toward functional goals.  Patient is appropriate to continue with skilled physical therapy intervention, as indicated by initial plan of care.   She did have improvement in her fascial tensions with treatment. This should help to improve sinus drainage and decrease HA's.     Goal Status:  Pt. will demonstrate/verbalize independence in self-management of condition in : 12 weeks  Pt. will be independent with home exercise program in : 12 weeks  Pt. will have improved quality of sleep: with less pain;waking less times/night;in 6 weeks  Pt. will show improved balance for safer : ambulation;standing;for dressing/grooming;for chores;for community ambulation;for exercise/recreation;independently;in 12 weeks  Pt. will be able to walk : 30 minutes;on uneven/inclined surfaces;1 mile;with less pain;with less difficulty;for community mobility;for exercise/recreation;in 12 weeks  Pt. will bend: to dress;to clean;with less pain;with less difficulty;for self care;for exercise/recreation;in 12 weeks  Patient will ascend / descend: stairs;step;curb;without railing;with recipricol gait;without assistive device;independently;with less pain;with less difficulty;in 12 weeks  Patient will sit: 60 minutes;for driving;for watching TV;for other activity;with less pain;with less  difficultty;in 12 weeks  Patient will transfer: sit/stand;supine/sit;floor/stand;for toileting;for in/out of bed;for in/out of chair;for other activity;with less pain;with less difficulty;in 12 weeks    No data recorded    Plan / Patient Education:     Plan to con't with manual therapy to decrease fascial tension/tone to normalize ROM/decrease inflammation/decrease mm tone and improve proprioception.      Subjective:     Pain Ratin      R LE pain has returned after 1 day following treatment   Crushing sensation to the R knee     Objective:     + Scan   Visceral   Mid and Lower abdominal    Treatment Today   7/10/2019   TREATMENT MINUTES COMMENTS   Evaluation     Self-care/ Home management 65 SCS to Cecum Med, Inf  Vagus to cecum   SCS to R FFIB-P,   TVAR-P,   TVAL-P,   TIBF-P,   TIBE-P,   CALCE-P,   CELE-P,   JIMENEZ-P,   PATF-P,   PFOM-P,    PFIL-P,   FEMET-P,    FEMIT-P,   FEME-P,   KTing of R Sciatic nerve     Pain to R LE reduced 6/10 to 0/10 with periosteal releases to the bones of the R LE    Pt experiencing a warming sensation to the R LE following therapy.   Will determine if taping of sciatic nerve is a viable option in dealing with pt's R LE's RSD.     Manual therapy     Neuromuscular Re-education     Therapeutic Activity     Therapeutic Exercises     Gait training     Modality__________________                Total 65    Blank areas are intentional and mean the treatment did not include these items.       Yogi Velazquze  7/10/2019

## 2021-05-30 NOTE — PROGRESS NOTES
[facility] Daily Progress     Patient Name: Sandy Spencer  Date: 7/25/2019  Visit #: 7       Referral Diagnosis: B knee pain  Referring provider: Michael Ramirez DO  Visit Diagnosis: R LE pain,  Congestion   L ear drainage difficulty    ICD-10-CM    1. 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 Chronic pain of right knee M25.561     G89.29    3. Localized swelling of lower leg R22.40    4. Lumbar radiculopathy M54.16    5. Reflex sympathetic dystrophy G90.50    6. Complex regional pain syndrome type 1 of both lower extremities G90.523          Assessment:     Patient is benefitting from skilled physical therapy and is making steady progress toward functional goals.  Patient is appropriate to continue with skilled physical therapy intervention, as indicated by initial plan of care.   She did have improvement in her fascial tensions with treatment. This should help to improve sinus drainage and decrease HA's.     Goal Status:  Pt. will demonstrate/verbalize independence in self-management of condition in : 12 weeks  Pt. will be independent with home exercise program in : 12 weeks  Pt. will have improved quality of sleep: with less pain;waking less times/night;in 6 weeks  Pt. will show improved balance for safer : ambulation;standing;for dressing/grooming;for chores;for community ambulation;for exercise/recreation;independently;in 12 weeks  Pt. will be able to walk : 30 minutes;on uneven/inclined surfaces;1 mile;with less pain;with less difficulty;for community mobility;for exercise/recreation;in 12 weeks  Pt. will bend: to dress;to clean;with less pain;with less difficulty;for self care;for exercise/recreation;in 12 weeks  Patient will ascend / descend: stairs;step;curb;without railing;with recipricol gait;without assistive device;independently;with less pain;with less difficulty;in 12 weeks  Patient will sit: 60 minutes;for driving;for watching TV;for other activity;with less  pain;with less difficultty;in 12 weeks  Patient will transfer: sit/stand;supine/sit;floor/stand;for toileting;for in/out of bed;for in/out of chair;for other activity;with less pain;with less difficulty;in 12 weeks    No data recorded    Plan / Patient Education:     Plan to con't with manual therapy to decrease fascial tension/tone to normalize ROM/decrease inflammation/decrease mm tone and improve proprioception.      Subjective:     Pain Ratin  L Head pain    4-6/10 to LE   When wearing the Pain  3/10         Objective:     + Scan  R SI upslip  Post Neuro  Pelvis R,       Treatment Today   2019   TREATMENT MINUTES COMMENTS   Evaluation     Self-care/ Home management           Manual therapy 60 MFR with OA, L5-SI and SI joint releases, Dural Tube Pull.   MFR to neck and head,  SCS to Bladder Sphinter,   Bladder.  Renal Art and Vein    Elastic Taping of R Sciatic nerve,  L5-S1 TAPING OF THE R Knee    Pain reduced R LE 0/10   L knee 3/10  Tender points resolved   Head pressure reduced  HA reduced      Neuromuscular Re-education     Therapeutic Activity     herapeutic Exercises     Gait training     Modality__________________                Total 60    Blank areas are intentional and mean the treatment did not include these items.       Yogi Velazquez  2019

## 2021-05-30 NOTE — PROGRESS NOTES
"Assessment/Plan:     Problem List Items Addressed This Visit     RSD upper limb, right    Relevant Medications    fentaNYL (DURAGESIC) 50 mcg/hr (Start on 7/31/2019)            No follow-ups on file.    Patient Instructions   PLAN:     Discussed you will use med for overflow diarrhea    Discussed electromagnetic therapy for morales/rib facia pain that may be causing GI symptoms, call Dr. Kenneth Thomas [phone]    Return in 8 weeks        Subjective:       78 y.o. female presents for evaluation pain problems including lower extremity complex regional pain syndrome, migraine headache.  Pain varies from a 2-10, 6 presently.    She describes she is \"not good\".  She has been having problems with diarrhea which is affecting her INR.  As such she cannot have her filter removed.    Continues on fentanyl 50 mcg every 48 hours, unclear she feels how she is absorbing.  We reviewed a topical patch is better than oral agents at this time.    She is a problems of sinus infection on antibiotics, and fluid in her ear.    Reviews working in physical therapy, her therapist has been using taping of sciatic nerve to help with visceral symptoms and she describes some progress.    She has been have been going to pool 2 times a week until recent with the diarrhea.    She is working closely Dr. Bobo in the GI, discussed concept of overflow diarrhea which she notes may be an issue.    She reviews having CT of her abdomen lungs, negative for pulmonary embolism.  She reviews concern that in her left lower back understanding the PT is working on there, describes that it is \"locked\" with rib movement      Current Outpatient Medications:      acetaminophen (TYLENOL) 500 MG tablet, Take 650 mg by mouth 3 (three) times a day.    , Disp: , Rfl:      atorvastatin (LIPITOR) 80 MG tablet, TAKE 1 TABLET(80 MG) BY MOUTH AT BEDTIME, Disp: 90 tablet, Rfl: 1     azelastine (ASTEPRO) 0.15 % (205.5 mcg) Spry nasal spray, Apply 1 spray into each nostril 2 " (two) times a day as needed., Disp: 30 mL, Rfl: 11     calcium, as carbonate, (TUMS) 200 mg calcium (500 mg) chewable tablet, Chew 1 tablet (200 mg total) 3 (three) times a day as needed., Disp: 30 tablet, Rfl: 0     cefdinir (OMNICEF) 300 MG capsule, Take 1 capsule (300 mg total) by mouth 2 (two) times a day for 10 days., Disp: 20 capsule, Rfl: 0     cholecalciferol, vitamin D3, 5,000 unit Tab, Take 1 tablet by mouth daily with supper.    , Disp: , Rfl:      cyanocobalamin, vitamin B-12, 5,000 mcg Subl, Take 1 tablet by mouth., Disp: , Rfl:      cyanocobalamin/mecobalamin (CYANOCOBALAMIN-METHYLCOBALAMIN SL), Place 5,000 mcg under the tongue daily., Disp: , Rfl:      diclofenac sodium (VOLTAREN) 1 % Gel, Apply 4 g topically. (apply to knees Q AM and Q 2pm and prn.  May self-administer), Disp: , Rfl:      diphenoxylate-atropine (LOMOTIL) 2.5-0.025 mg per tablet, Take 1 tablet by mouth 4 (four) times a day as needed for diarrhea., Disp: 30 tablet, Rfl: 1     enoxaparin (LOVENOX) 60 mg/0.6 mL syringe, Inject 0.6 mL (60 mg total) under the skin every 12 (twelve) hours., Disp: 10 Syringe, Rfl: 1     [START ON 7/31/2019] fentaNYL (DURAGESIC) 50 mcg/hr, Place 1 patch on the skin every other day., Disp: 15 patch, Rfl: 0     ferrous sulfate 65 mg elemental iron, Take 1 tablet by mouth daily with breakfast., Disp: , Rfl:      furosemide (LASIX) 20 MG tablet, Take by mouth. (every other day PRN for weight gain of 3# in 24 hours or SBP >180), Disp: , Rfl:      GENERLAC 10 gram/15 mL solution, TK 30ML PO QD (Patient taking differently: Take 30 mL by mouth daily as needed. TK 30ML PO QD   ), Disp: 236 mL, Rfl: 3     hydrOXYzine HCl (ATARAX) 10 MG tablet, Take 1 tablet (10 mg total) by mouth 3 (three) times a day., Disp: 90 tablet, Rfl: 1     KRILL OIL ORAL, Take 350 mg by mouth daily., Disp: , Rfl:      krill-omega-3-dha-epa-lipids (KRILL OIL) 810-42-25-50 mg cap, Take 1 capsule by mouth., Disp: , Rfl:      Lactobacillus  Manuel (FLORANEX) 1 million cell Tab tablet, Take 1 tablet by mouth daily., Disp: 30 tablet, Rfl: 0     lamoTRIgine (LAMICTAL) 25 MG tablet, Take 1 tablet (25 mg total) by mouth daily., Disp: 90 tablet, Rfl: 2     lamoTRIgine (LAMICTAL) 5 MG TChD, Take 1 tablet by mouth 2 (two) times a day., Disp: , Rfl: 0     levothyroxine (LEVO-T) 50 MCG tablet, Take 1 tablet (50 mcg total) by mouth Daily at 6:00 am., Disp: 30 tablet, Rfl: 3     loperamide (IMODIUM) 2 mg capsule, Take 2 mg by mouth 4 (four) times a day as needed for diarrhea .   , Disp: , Rfl: 1     MAGNESIUM ORAL, Take by mouth daily., Disp: , Rfl:      morphine (MSIR) 15 MG tablet, Take 0.5 tablets (7.5 mg total) by mouth 2 (two) times a day as needed for pain., Disp: 28 tablet, Rfl: 0     naloxone (NARCAN) 4 mg/actuation nasal spray, 1 spray (4 mg dose) into one nostril for opioid reversal. Call 911. May repeat if no response in 3 minutes., Disp: 1 Box, Rfl: 0     promethazine (PHENERGAN) 12.5 MG tablet, Take 1 tablet (12.5 mg total) by mouth every 6 (six) hours as needed for nausea., Disp: 20 tablet, Rfl: 1     rosuvastatin (CRESTOR) 40 MG tablet, Take 1 tablet (40 mg total) by mouth daily., Disp: 30 tablet, Rfl: 3     senna-docusate (PERICOLACE) 8.6-50 mg tablet, Take 2 tablets by mouth daily as needed., Disp: 30 tablet, Rfl: 0     SUMAtriptan (IMITREX) 50 MG tablet, Take 1 tablet (50 mg total) by mouth every 2 (two) hours as needed for migraine., Disp: 27 tablet, Rfl: 1     topiramate (TOPAMAX) 50 MG tablet, Take 1.5 tablets (75 mg total) by mouth at bedtime AND 1 tablet (50 mg total) every morning., Disp: 225 tablet, Rfl: 1     traZODone (DESYREL) 100 MG tablet, TAKE 2 TO 4 TABLETS(200  MG) BY MOUTH AT BEDTIME AS NEEDED FOR SLEEP, Disp: 360 tablet, Rfl: 0     warfarin (COUMADIN/JANTOVEN) 5 MG tablet, Take one to two tablets (5 to 10 mg) by mouth daily. Adjust dose based on INR results as directed., Disp: 180 tablet, Rfl: 1    Current  "Facility-Administered Medications:      denosumab 60 mg (PROLIA 60 mg/ml), 60 mg, Subcutaneous, Q6 Months, Andrei Olivera MD, 60 mg at 04/05/19 1318  She is alert with a clear sensorium.  Good eye contact.  Indeed appears quite worn which she attributes to the diarrhea.         Objective:     Vitals:    07/25/19 1459   BP: 155/80   Pulse: 72   Resp: 16   Weight: 123 lb (55.8 kg)   Height: 5' 2\" (1.575 m)   PainSc:   5   PainLoc: Head         Discussed using some of the medication she has available to address the overflow diarrhea.    Reviewed electromagnetic approaches that may help with the rib and fascia pain causing her GI symptoms.  Sources were discussed.    We will continue the fentanyl as.    Time spent more than 15 minutes face-to-face, 50% count about above condition coordination treatment plan          This note has been dictated using voice recognition software. Any grammatical or context distortions are unintentional and inherent to the software  "

## 2021-05-30 NOTE — PROGRESS NOTES
ASSESSMENT/PLAN:       1. Cluster headaches  -Given her worsening headaches we discussed increasing her Topamax versus changing to an additional medication.  Given her chronic kidney disease I want to be cautious about the dose increase but we will start with 75 mg at bedtime and 50 mg in the morning.  She will follow-up here in approximately 1 month for recheck.  At that time we could consider increasing her morning Topamax as well.  - topiramate (TOPAMAX) 50 MG tablet; Take 1.5 tablets (75 mg total) by mouth at bedtime AND 1 tablet (50 mg total) every morning.  Dispense: 225 tablet; Refill: 1    2. Chronic kidney disease (CKD) stage G3a/A1, moderately decreased glomerular filtration rate (GFR) between 45-59 mL/min/1.73 square meter and albuminuria creatinine ratio less than 30 mg/g (H)  -Given her worsening kidney function last time updating her basic metabolic panel again.  She will continue eating well and drinking fluids.  - Basic Metabolic Panel    3. Pulmonary embolism (H)  -Doing well on the warfarin at this time.  Trying to maintain her vitamin K intake stable.  Once she is therapeutic on warfarin for 2 weeks we will place a referral for her to have her IVC filter removed.  We discussed the importance of being therapeutic on her Coumadin to feel comfortable removing the filter.  - INR      Return in about 4 weeks (around 7/23/2019) for recheck.    30 minutes was spent face-to-face with the patient during this encounter and >50% of this was spent on counseling and coordination of care. We discussed in depth the topics listed above.       Caitlyn Rees MD      PROGRESS NOTE   6/25/2019    SUBJECTIVE:  Sandy Spencer is a 78 y.o. female  who presents for follow up.     She is generally still feeling off. She does continue to struggle with her allergies. She is taking the astelin and is not sure if this is helping. She does wonder if some allergy isues are contributing to her headaches. She has  "done flonase in the past and it hasn't worked well for her.     She is always on the verge of a headache. Every day. She doesn't know if it is time to switch. She is going to take magnesim as well by the recommendation of the spine clinic.     She does have a 3 pound weight loss as well, is very nauseated again, although is not throwing up. She does not have diarrhea. She has had no fevers. She does better with liquids than she does with chewing. She is considering doing another shake as this may help her betterwith the nausea.     Per my last note she is to be therapeutic for one month prior to having her IVC filter removed.She does want to have this out. She hasn't been doing the green veggies as much but is doing the ensure dialy.       Chief Complaint   Patient presents with     Follow-up     Patient states she is not \"doing well\" \"very out sorts\"         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     Temporomandibular joint-pain-dysfunction syndrome (TMJ)     Pancreatitis     Localized swelling of lower leg     Acute right-sided low back pain without sciatica     Acquired hypothyroidism     Chronic pain syndrome     Snoring     Diarrhea     Abdominal pain, generalized     Dermatochalasis of left eyelid     Bilateral carotid artery stenosis     Coronary artery disease involving native coronary artery of native heart without angina pectoris     Sinus bradycardia "     Other chronic pulmonary embolism without acute cor pulmonale (H)     Splenic infarction     Opioid type dependence, continuous (H)     Hematuria     Anemia due to blood loss, acute     Hydronephrosis     Bladder spasms     Hypotension, unspecified hypotension type     Acute reaction to stress     Anxiety disorder due to medical condition     Family relationship problem     History of posttraumatic stress disorder (PTSD)     Generalized muscle weakness       Current Outpatient Medications   Medication Sig Dispense Refill     acetaminophen (TYLENOL) 500 MG tablet Take 650 mg by mouth 3 (three) times a day.              azelastine (ASTEPRO) 0.15 % (205.5 mcg) Spry nasal spray Apply 1 spray into each nostril 2 (two) times a day as needed. 30 mL 11     calcium, as carbonate, (TUMS) 200 mg calcium (500 mg) chewable tablet Chew 1 tablet (200 mg total) 3 (three) times a day as needed. 30 tablet 0     cholecalciferol, vitamin D3, 5,000 unit Tab Take 1 tablet by mouth daily with supper.              cyanocobalamin, vitamin B-12, 5,000 mcg Subl Take 1 tablet by mouth.       cyanocobalamin/mecobalamin (CYANOCOBALAMIN-METHYLCOBALAMIN SL) Place 5,000 mcg under the tongue daily.       diclofenac sodium (VOLTAREN) 1 % Gel Apply 4 g topically. (apply to knees Q AM and Q 2pm and prn.  May self-administer)       diphenoxylate-atropine (LOMOTIL) 2.5-0.025 mg per tablet Take 1 tablet by mouth 4 (four) times a day as needed for diarrhea. 30 tablet 1     enoxaparin (LOVENOX) 60 mg/0.6 mL syringe Inject 0.6 mL (60 mg total) under the skin every 12 (twelve) hours. 10 Syringe 1     fentaNYL (DURAGESIC) 50 mcg/hr Place 1 patch on the skin every other day. 15 patch 0     ferrous sulfate 65 mg elemental iron Take 1 tablet by mouth daily with breakfast.       furosemide (LASIX) 20 MG tablet Take by mouth. (every other day PRN for weight gain of 3# in 24 hours or SBP >180)       GENERLAC 10 gram/15 mL solution TK 30ML PO QD (Patient taking  differently: Take 30 mL by mouth daily as needed. TK 30ML PO QD      ) 236 mL 3     KRILL OIL ORAL Take 350 mg by mouth daily.       krill-omega-3-dha-epa-lipids (KRILL OIL) 603-00-57-50 mg cap Take 1 capsule by mouth.       Lactobacillus acidoph-L.bulgar (FLORANEX) 1 million cell Tab tablet Take 1 tablet by mouth daily. 30 tablet 0     lamoTRIgine (LAMICTAL) 25 MG tablet Take 1 tablet (25 mg total) by mouth daily. 90 tablet 2     levothyroxine (LEVO-T) 50 MCG tablet Take 1 tablet (50 mcg total) by mouth Daily at 6:00 am. 30 tablet 3     loperamide (IMODIUM) 2 mg capsule Take 2 mg by mouth 4 (four) times a day as needed for diarrhea .        1     morphine (MSIR) 15 MG tablet Take 0.5 tablets (7.5 mg total) by mouth 2 (two) times a day as needed for pain. 28 tablet 0     naloxone (NARCAN) 4 mg/actuation nasal spray 1 spray (4 mg dose) into one nostril for opioid reversal. Call 911. May repeat if no response in 3 minutes. 1 Box 0     promethazine (PHENERGAN) 12.5 MG tablet Take 1 tablet (12.5 mg total) by mouth every 6 (six) hours as needed for nausea. 20 tablet 1     rosuvastatin (CRESTOR) 40 MG tablet Take 1 tablet (40 mg total) by mouth daily. 30 tablet 3     senna-docusate (PERICOLACE) 8.6-50 mg tablet Take 2 tablets by mouth daily as needed. 30 tablet 0     SUMAtriptan (IMITREX) 50 MG tablet Take 1 tablet (50 mg total) by mouth every 2 (two) hours as needed for migraine. 27 tablet 1     topiramate (TOPAMAX) 50 MG tablet Take 1.5 tablets (75 mg total) by mouth at bedtime AND 1 tablet (50 mg total) every morning. 225 tablet 1     traZODone (DESYREL) 100 MG tablet TAKE 2 TO 4 TABLETS(200  MG) BY MOUTH AT BEDTIME AS NEEDED FOR SLEEP 360 tablet 0     warfarin (COUMADIN/JANTOVEN) 5 MG tablet Take one to two tablets (5 to 10 mg) by mouth daily. Adjust dose based on INR results as directed. 180 tablet 1     hydrOXYzine HCl (ATARAX) 10 MG tablet Take 1 tablet (10 mg total) by mouth 3 (three) times a day. 90 tablet  1     lamoTRIgine (LAMICTAL) 5 MG TChD Take 1 tablet by mouth 2 (two) times a day.  0     Current Facility-Administered Medications   Medication Dose Route Frequency Provider Last Rate Last Dose     denosumab 60 mg (PROLIA 60 mg/ml)  60 mg Subcutaneous Q6 Months Andrei Olivera MD   60 mg at 19 1318       Social History     Tobacco Use   Smoking Status Former Smoker     Packs/day: 1.00     Years: 20.00     Pack years: 20.00     Last attempt to quit: 2000     Years since quittin.4   Smokeless Tobacco Never Used           OBJECTIVE:        Recent Results (from the past 240 hour(s))   INR   Result Value Ref Range    INR 1.50 (H) 0.90 - 1.10   INR   Result Value Ref Range    INR 2.00 (H) 0.90 - 1.10   Basic Metabolic Panel   Result Value Ref Range    Sodium 136 136 - 145 mmol/L    Potassium 3.9 3.5 - 5.0 mmol/L    Chloride 107 98 - 107 mmol/L    CO2 20 (L) 22 - 31 mmol/L    Anion Gap, Calculation 9 5 - 18 mmol/L    Glucose 85 70 - 125 mg/dL    Calcium 8.7 8.5 - 10.5 mg/dL    BUN 22 8 - 28 mg/dL    Creatinine 1.15 (H) 0.60 - 1.10 mg/dL    GFR MDRD Af Amer 56 (L) >60 mL/min/1.73m2    GFR MDRD Non Af Amer 46 (L) >60 mL/min/1.73m2   INR   Result Value Ref Range    INR 1.90 (H) 0.90 - 1.10       Vitals:    19 1506   BP: 130/64   Pulse: 77   Temp: 99  F (37.2  C)   SpO2: 98%   Weight: 121 lb (54.9 kg)     Weight: 121 lb (54.9 kg)          Physical Exam:  GENERAL APPEARANCE: A&A, NAD, well hydrated, well nourished  SKIN:  Normal skin turgor, no lesions/rashes   HEENT: moist mucous membranes, no rhinorrhea  NECK: Normal  CV: RRR, no M/G/R   LUNGS: CTAB  Psych: Her affect is somewhat anxious appearing, she is casually dressed and groomed, her thought process and speech pattern are normal  EXTREMITY: no edema   NEURO: no gross deficits, appropriate reflexes bilaterally

## 2021-05-30 NOTE — TELEPHONE ENCOUNTER
Closed note on accident. Please also relay the below test result to the patient.    Call patient to notify her of iron stores are drifting down again. Please make sure she is taking her iron. Her kidneys are stable, and the rest of her labs look ok.

## 2021-05-30 NOTE — TELEPHONE ENCOUNTER
ANTICOAGULATION  MANAGEMENT    Assessment     Today's INR result of 2.9 is Therapeutic (goal INR of 2.0-3.0)        Warfarin taken as previously instructed    No new diet changes affecting INR    No new medication/supplements affecting INR    Continues to tolerate warfarin with no reported s/s of bleeding or thromboembolism     Previous INR was Therapeutic    Plan:     Spoke with Sandy regarding INR result and instructed:     Warfarin Dosing Instructions:  Continue current warfarin dose 7.5 mg daily on Mondays; and 10 mg daily rest of week  (0 % change)    Instructed patient to follow up no later than: two weeks. She will have checked at MD visit on 7/19    Education provided: importance of therapeutic range, importance of following up for INR monitoring at instructed interval and importance of taking warfarin as instructed    Sandy verbalizes understanding and agrees to warfarin dosing plan.    Instructed to call the [facility] for any changes, questions or concerns. (#[phone])   ?   Marisol Currie RN    Subjective/Objective:      Sandy ELSIE Spencer, a 78 y.o. female is on warfarin.     Sandy reports:     Home warfarin dose: verbally confirmed home dose with Sandy and updated on anticoagulation calendar     Missed doses: No     Medication changes:  No     S/S of bleeding or thromboembolism:  No     New Injury or illness:  No     Changes in diet or alcohol consumption:  No     Upcoming surgery, procedure or cardioversion:  No    Anticoagulation Episode Summary     Current INR goal:   2.0-3.0   TTR:   29.3 % (3.2 mo)   Next INR check:   7/23/2019   INR from last check:   2.90 (7/9/2019)   Weekly max warfarin dose:      Target end date:      INR check location:      Preferred lab:      Send INR reminders to:   [facility]    Indications    Other chronic pulmonary embolism without acute cor pulmonale (H) [I27.82]           Comments:            Anticoagulation Care Providers     Provider Role  Specialty Phone number    Caitlyn Rees MD Referring Family Medicine [phone]

## 2021-05-30 NOTE — TELEPHONE ENCOUNTER
ANTICOAGULATION  MANAGEMENT    Assessment     Today's INR result of 4.8 is Supratherapeutic (goal INR of 2.0-3.0)        Warfarin taken as previously instructed    Intermittent diarrhea may be affecting diet and INR - has been worse in the last week    No new medication/supplements affecting INR    Continues to tolerate warfarin with no reported s/s of bleeding or thromboembolism     Previous INR was Therapeutic    Plan:     Spoke with Sandy regarding INR result and instructed:     Warfarin Dosing Instructions:  Hold warfarin today only then change warfarin dose to 7.5 mg daily on Mon, Thu, Sat; and 10 mg daily rest of week  (7.4 % change)    Instructed patient to follow up no later than: 7-10 days    Education provided: target INR goal and significance of current INR result, importance of following up for INR monitoring at instructed interval, importance of taking warfarin as instructed, monitoring for bleeding signs and symptoms and when to seek medical attention/emergency care    Sandy verbalizes understanding and agrees to warfarin dosing plan.    Instructed to call the [facility] for any changes, questions or concerns. (#[phone])   ?   Lorena Reeder RN    Subjective/Objective:      Sandy Spencer, a 78 y.o. female is on warfarin.     Sandy reports:     Home warfarin dose: verbally confirmed home dose with Sandy and updated on anticoagulation calendar     Missed doses: No     Medication changes:  No     S/S of bleeding or thromboembolism:  No     New Injury or illness:  Yes: intermittent diarrhea, worse in last few weeks     Changes in diet or alcohol consumption:  No     Upcoming surgery, procedure or cardioversion:  No    Anticoagulation Episode Summary     Current INR goal:   2.0-3.0   TTR:   27.1 % (3.5 mo)   Next INR check:   7/29/2019   INR from last check:   4.80! (7/19/2019)   Weekly max warfarin dose:      Target end date:      INR check location:      Preferred lab:      Send INR reminders to:    [facility]    Indications    Other chronic pulmonary embolism without acute cor pulmonale (H) [I27.82]           Comments:            Anticoagulation Care Providers     Provider Role Specialty Phone number    Caitlyn Rees MD [facility] [phone]

## 2021-05-30 NOTE — TELEPHONE ENCOUNTER
RN cannot approve Refill Request    RN can NOT refill this medication medication not on med list.       Perla Reyna, [facility] Triage/Med Refill 7/9/2019    Requested Prescriptions   Pending Prescriptions Disp Refills     atorvastatin (LIPITOR) 80 MG tablet [Pharmacy Med Name: ATORVASTATIN 80MG TABLETS] 90 tablet 0     Sig: TAKE 1 TABLET(80 MG) BY MOUTH AT BEDTIME       Statins Refill Protocol (Hmg CoA Reductase Inhibitors) Passed - 7/9/2019  3:38 PM        Passed - PCP or prescribing provider visit in past 12 months      Last office visit with prescriber/PCP: 6/25/2019 Caitlyn Rees MD OR same dept: 6/25/2019 Caitlyn Rees MD OR same specialty: 6/25/2019 Caitlyn Rees MD  Last physical: 12/4/2018 Last MTM visit: Visit date not found   Next visit within 3 mo: Visit date not found  Next physical within 3 mo: Visit date not found  Prescriber OR PCP: Caitlyn Rees MD  Last diagnosis associated with med order: 1. Hyperlipidemia  - atorvastatin (LIPITOR) 80 MG tablet [Pharmacy Med Name: ATORVASTATIN 80MG TABLETS]; TAKE 1 TABLET(80 MG) BY MOUTH AT BEDTIME  Dispense: 90 tablet; Refill: 0    If protocol passes may refill for 12 months if within 3 months of last provider visit (or a total of 15 months).

## 2021-05-30 NOTE — PROGRESS NOTES
ASSESSMENT/PLAN:       1. Chronic kidney disease (CKD) stage G3a/A1, moderately decreased glomerular filtration rate (GFR) between 45-59 mL/min/1.73 square meter and albuminuria creatinine ratio less than 30 mg/g (H)  -Labs have remained stable, updating again today.  Continue to monitor, if worsening consider referral to nephrology  - Comprehensive Metabolic Panel    2. Anemia, unspecified type  -Has had iron deficiency anemia since her hospitalization, updating lab work today to make sure it starting to improve.  - Iron and Transferrin Iron Binding Capacity  - Ferritin  - HM1(CBC and Differential)  - HM1 (CBC with Diff)    3. Sinus pain  -Provided the patient with Omnicef to start taking in the next several days if her symptoms are not improving.  I suspect her headaches are related to her sinus congestion and pressure.    4. Abdominal pain, generalized  -Given her persistent abdominal pain, updating a CT scan of the abdomen and pelvis as well as the chest to look for any other obvious etiologies, blockages, or new pulmonary embolus given her concern.  I suspect this is musculoskeletal in nature or related to her chronic back issues or possibly secondary to scar tissue.  - cefdinir (OMNICEF) 300 MG capsule; Take 1 capsule (300 mg total) by mouth 2 (two) times a day for 10 days.  Dispense: 20 capsule; Refill: 0  - CTA Chest PE Run; Future    5. Diarrhea, unspecified type  -Long-standing in nature, discussed the possibility of this being overflow diarrhea secondary to her chronic narcotic treatments.  Recommended she start taking fiber again as she has not been using this most recently as this will help both constipation and diarrhea.  Consider referral back to gastroenterology if needed.    6. Pulmonary embolism (H)  -The patient has had a few therapeutic INRs in a row, but her INR continues to be quite labile.  I question whether it is wise to take out her filter at this point, continue to monitor for now.  -  INR      Return in about 3 weeks (around 8/9/2019).      Caitlyn Rees MD    If INR is normal will refer to IR. She does need to have MAC anesthesia due to her anxiety.       PROGRESS NOTE   7/19/2019    SUBJECTIVE:  Sandy Spencer is a 78 y.o. female  who presents for follow up.     She did have her right sciatic nerve taped and she had her diarrhea stop for 6 days. She was in the house for 3 weeks straight wearing a depned and with the tape she did much better. She now has constant rumbling. She didn't have a bowel movement R-Sat and then had diarrhea and constipation on Sunday. Trevor webber took it off Monday for PT. She then went to PT and then into the pool. It's not as effective as it was the first time. He taped both legs and she now has diarrhea all over again.     She did take the prescription and OTC medication and this doesn't work. She did see Dr. Bobo in GI and he wasn't sure there was anything to do for this. She has been eating fried rice to help with her stomach. She does have fiber at home, it doesn't seem to work.  She has not been using it recently however.  There have been concern for some overflow diarrhea related to her chronic narcotic usage.    She does have more headaches as well. She is taking 150 mg of Topamax a day and she is having more issues with her headaches. She does have more left sided pressure and congestin. She did have a left nasal fracture and a left fractured skull on the left. She didn't have them repaired when they were broken. She had to stay at home until the brain swelling went down in her head.     She does have a pain in her left groin as well.     She does have a pain in her left flank as well, feels like an organ pain. It doesn't feel like bone pain. It makes her nauseated. She knows it's not in her head. She did have a CT scan in March for flank pain.  She remains concerned that there is something wrong with her intra-abdominal organs given his  persistent pain.  She feels that this could be related to her pulmonary embolus she had this spring or related to something more sinister.    She did quit taking the iron pills for a while. She does not want to have dark stools with this.   Chief Complaint   Patient presents with     Follow-up     Patient is here today for a three week follow up in regards to her headaches, pulmonary embolism and kidney disease.      INR Check     Patient is needing her INR to be checked today.      Diarrhea     Patient has had diarrhea for the past month, only went 5 days without diarrhea. Patient states the diarrhea stopped as soon as her back side was taped at PT. No diet changes.       Sinus Problem     Patient has been having left side facial pressure pain and congestion. Believes she may have low grade temps.          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     Temporomandibular joint-pain-dysfunction syndrome (TMJ)     Pancreatitis     Localized swelling of lower leg     Acute right-sided low back pain without sciatica     Acquired hypothyroidism     Chronic pain syndrome     Snoring     Diarrhea     Abdominal pain, generalized     Dermatochalasis of left eyelid     Bilateral carotid artery stenosis     Coronary artery disease involving native coronary artery of native heart without angina pectoris     Sinus bradycardia     Other chronic pulmonary embolism without acute  cor pulmonale (H)     Splenic infarction     Opioid type dependence, continuous (H)     Hematuria     Anemia due to blood loss, acute     Hydronephrosis     Bladder spasms     Hypotension, unspecified hypotension type     Acute reaction to stress     Anxiety disorder due to medical condition     Family relationship problem     History of posttraumatic stress disorder (PTSD)     Generalized muscle weakness       Current Outpatient Medications   Medication Sig Dispense Refill     acetaminophen (TYLENOL) 500 MG tablet Take 650 mg by mouth 3 (three) times a day.              atorvastatin (LIPITOR) 80 MG tablet TAKE 1 TABLET(80 MG) BY MOUTH AT BEDTIME 90 tablet 1     azelastine (ASTEPRO) 0.15 % (205.5 mcg) Spry nasal spray Apply 1 spray into each nostril 2 (two) times a day as needed. 30 mL 11     calcium, as carbonate, (TUMS) 200 mg calcium (500 mg) chewable tablet Chew 1 tablet (200 mg total) 3 (three) times a day as needed. 30 tablet 0     cholecalciferol, vitamin D3, 5,000 unit Tab Take 1 tablet by mouth daily with supper.              cyanocobalamin, vitamin B-12, 5,000 mcg Subl Take 1 tablet by mouth.       cyanocobalamin/mecobalamin (CYANOCOBALAMIN-METHYLCOBALAMIN SL) Place 5,000 mcg under the tongue daily.       diclofenac sodium (VOLTAREN) 1 % Gel Apply 4 g topically. (apply to knees Q AM and Q 2pm and prn.  May self-administer)       diphenoxylate-atropine (LOMOTIL) 2.5-0.025 mg per tablet Take 1 tablet by mouth 4 (four) times a day as needed for diarrhea. 30 tablet 1     enoxaparin (LOVENOX) 60 mg/0.6 mL syringe Inject 0.6 mL (60 mg total) under the skin every 12 (twelve) hours. 10 Syringe 1     fentaNYL (DURAGESIC) 50 mcg/hr Place 1 patch on the skin every other day. 15 patch 0     ferrous sulfate 65 mg elemental iron Take 1 tablet by mouth daily with breakfast.       furosemide (LASIX) 20 MG tablet Take by mouth. (every other day PRN for weight gain of 3# in 24 hours or SBP >180)       GENERLAC 10 gram/15 mL  solution TK 30ML PO QD (Patient taking differently: Take 30 mL by mouth daily as needed. TK 30ML PO QD      ) 236 mL 3     hydrOXYzine HCl (ATARAX) 10 MG tablet Take 1 tablet (10 mg total) by mouth 3 (three) times a day. 90 tablet 1     KRILL OIL ORAL Take 350 mg by mouth daily.       krill-omega-3-dha-epa-lipids (KRILL OIL) 044-13-91-50 mg cap Take 1 capsule by mouth.       Lactobacillus acidoph-L.bulgar (FLORANEX) 1 million cell Tab tablet Take 1 tablet by mouth daily. 30 tablet 0     lamoTRIgine (LAMICTAL) 25 MG tablet Take 1 tablet (25 mg total) by mouth daily. 90 tablet 2     lamoTRIgine (LAMICTAL) 5 MG TChD Take 1 tablet by mouth 2 (two) times a day.  0     levothyroxine (LEVO-T) 50 MCG tablet Take 1 tablet (50 mcg total) by mouth Daily at 6:00 am. 30 tablet 3     loperamide (IMODIUM) 2 mg capsule Take 2 mg by mouth 4 (four) times a day as needed for diarrhea .        1     MAGNESIUM ORAL Take by mouth daily.       morphine (MSIR) 15 MG tablet Take 0.5 tablets (7.5 mg total) by mouth 2 (two) times a day as needed for pain. 28 tablet 0     naloxone (NARCAN) 4 mg/actuation nasal spray 1 spray (4 mg dose) into one nostril for opioid reversal. Call 911. May repeat if no response in 3 minutes. 1 Box 0     promethazine (PHENERGAN) 12.5 MG tablet Take 1 tablet (12.5 mg total) by mouth every 6 (six) hours as needed for nausea. 20 tablet 1     rosuvastatin (CRESTOR) 40 MG tablet Take 1 tablet (40 mg total) by mouth daily. 30 tablet 3     senna-docusate (PERICOLACE) 8.6-50 mg tablet Take 2 tablets by mouth daily as needed. 30 tablet 0     SUMAtriptan (IMITREX) 50 MG tablet Take 1 tablet (50 mg total) by mouth every 2 (two) hours as needed for migraine. 27 tablet 1     topiramate (TOPAMAX) 50 MG tablet Take 1.5 tablets (75 mg total) by mouth at bedtime AND 1 tablet (50 mg total) every morning. 225 tablet 1     traZODone (DESYREL) 100 MG tablet TAKE 2 TO 4 TABLETS(200  MG) BY MOUTH AT BEDTIME AS NEEDED FOR SLEEP 360  tablet 0     warfarin (COUMADIN/JANTOVEN) 5 MG tablet Take one to two tablets (5 to 10 mg) by mouth daily. Adjust dose based on INR results as directed. 180 tablet 1     cefdinir (OMNICEF) 300 MG capsule Take 1 capsule (300 mg total) by mouth 2 (two) times a day for 10 days. 20 capsule 0     Current Facility-Administered Medications   Medication Dose Route Frequency Provider Last Rate Last Dose     denosumab 60 mg (PROLIA 60 mg/ml)  60 mg Subcutaneous Q6 Months Andrei Olivera MD   60 mg at 19 1318       Social History     Tobacco Use   Smoking Status Former Smoker     Packs/day: 1.00     Years: 20.00     Pack years: 20.00     Last attempt to quit: 2000     Years since quittin.5   Smokeless Tobacco Never Used           OBJECTIVE:        Recent Results (from the past 240 hour(s))   INR   Result Value Ref Range    INR 4.80 (H) 0.90 - 1.10   Iron and Transferrin Iron Binding Capacity   Result Value Ref Range    Iron 48 42 - 175 ug/dL    Transferrin 185 (L) 212 - 360 mg/dL    Transferrin Saturation, Calculated 21 20 - 50 %    Transferrin IBC, Calculated 231 (L) 313 - 563 ug/dL   Ferritin   Result Value Ref Range    Ferritin 166 (H) 10 - 130 ng/mL   Comprehensive Metabolic Panel   Result Value Ref Range    Sodium 136 136 - 145 mmol/L    Potassium 4.0 3.5 - 5.0 mmol/L    Chloride 107 98 - 107 mmol/L    CO2 21 (L) 22 - 31 mmol/L    Anion Gap, Calculation 8 5 - 18 mmol/L    Glucose 124 70 - 125 mg/dL    BUN 21 8 - 28 mg/dL    Creatinine 1.24 (H) 0.60 - 1.10 mg/dL    GFR MDRD Af Amer 51 (L) >60 mL/min/1.73m2    GFR MDRD Non Af Amer 42 (L) >60 mL/min/1.73m2    Bilirubin, Total 0.4 0.0 - 1.0 mg/dL    Calcium 8.6 8.5 - 10.5 mg/dL    Protein, Total 6.1 6.0 - 8.0 g/dL    Albumin 3.7 3.5 - 5.0 g/dL    Alkaline Phosphatase 41 (L) 45 - 120 U/L    AST 24 0 - 40 U/L    ALT 22 0 - 45 U/L   HM1 (CBC with Diff)   Result Value Ref Range    WBC 4.1 4.0 - 11.0 thou/uL    RBC 3.64 (L) 3.80 - 5.40 mill/uL    Hemoglobin  11.5 (L) 12.0 - 16.0 g/dL    Hematocrit 33.7 (L) 35.0 - 47.0 %    MCV 93 80 - 100 fL    MCH 31.5 27.0 - 34.0 pg    MCHC 34.0 32.0 - 36.0 g/dL    RDW 13.2 11.0 - 14.5 %    Platelets 165 140 - 440 thou/uL    MPV 7.3 7.0 - 10.0 fL    Neutrophils % 54 50 - 70 %    Lymphocytes % 35 20 - 40 %    Monocytes % 9 2 - 10 %    Eosinophils % 2 0 - 6 %    Basophils % 0 0 - 2 %    Neutrophils Absolute 2.2 2.0 - 7.7 thou/uL    Lymphocytes Absolute 1.4 0.8 - 4.4 thou/uL    Monocytes Absolute 0.4 0.0 - 0.9 thou/uL    Eosinophils Absolute 0.1 0.0 - 0.4 thou/uL    Basophils Absolute 0.0 0.0 - 0.2 thou/uL       Vitals:    07/19/19 1112   BP: 100/58   Patient Site: Left Arm   Patient Position: Sitting   Cuff Size: Adult Regular   Pulse: 80   Temp: 98.3  F (36.8  C)   TempSrc: Oral   SpO2: 98%   Weight: 123 lb 8 oz (56 kg)     Weight: 123 lb 8 oz (56 kg)          Physical Exam:  GENERAL APPEARANCE: A&A, NAD, well hydrated, well nourished  SKIN:  Normal skin turgor, no lesions/rashes   HEENT: moist mucous membranes, no rhinorrhea  NECK: Normal  CV: RRR, no M/G/R   LUNGS: CTAB  ABDOMEN: Soft, no guarding or rebound, no obvious organomegaly, flank tenderness to palpation over the left side  EXTREMITY: no edema   NEURO: no gross deficits   Psych: Her affect is anxious, she is casually dressed and groomed, her thought process and speech pattern are normal, eye contact is normal

## 2021-05-30 NOTE — TELEPHONE ENCOUNTER
ANTICOAGULATION  MANAGEMENT    Assessment     Today's INR result of 2.90 is Therapeutic (goal INR of 2.0-3.0)        Warfarin taken as previously instructed    No new diet changes affecting INR    No new medication/supplements affecting INR    Continues to tolerate warfarin with no reported s/s of bleeding or thromboembolism     Previous INR was Subtherapeutic    Plan:     Spoke with Sandy regarding INR result and instructed:     Warfarin Dosing Instructions:  Continue current warfarin dose 7.5 mg daily on MON; and 10 mg daily rest of week  (0 % change)    Instructed patient to follow up no later than: 7-10 days    Education provided: importance of therapeutic range and target INR goal and significance of current INR result    Sandy verbalizes understanding and agrees to warfarin dosing plan.    Instructed to call the [facility] for any changes, questions or concerns. (#[phone])   ?   Caitlyn Posey RN    Subjective/Objective:      Sandy Spencer, a 78 y.o. female is on warfarin.     Sandy reports:     Home warfarin dose: as updated on anticoagulation calendar per template     Missed doses: No     Medication changes:  No     S/S of bleeding or thromboembolism:  No     New Injury or illness:  No     Changes in diet or alcohol consumption:  No     Upcoming surgery, procedure or cardioversion:  No    Anticoagulation Episode Summary     Current INR goal:   2.0-3.0   TTR:   22.8 % (2.9 mo)   Next INR check:   7/9/2019   INR from last check:   2.90 (7/1/2019)   Weekly max warfarin dose:      Target end date:      INR check location:      Preferred lab:      Send INR reminders to:   [facility]    Indications    Other chronic pulmonary embolism without acute cor pulmonale (H) [I27.82]           Comments:            Anticoagulation Care Providers     Provider Role Specialty Phone number    Caitlyn Rees MD Referring Family Medicine [phone]

## 2021-05-30 NOTE — TELEPHONE ENCOUNTER
MEG- Dr. Olivera to see Allie in November.   She said that she is due for her next prolia injection some time in August.   Please confirm last injection and send the message back to the reg/janes team to arrange prolia injection appointment.     Sandy @ [phone]
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
## 2021-05-30 NOTE — TELEPHONE ENCOUNTER
ANTICOAGULATION  MANAGEMENT    Assessment     Today's INR result of 1.90 is Subtherapeutic (goal INR of 2.0-3.0)        Warfarin taken as previously instructed    No new diet changes affecting INR    No new medication/supplements affecting INR    Continues to tolerate warfarin with no reported s/s of bleeding or thromboembolism     Previous INR was Therapeutic    Plan:     Spoke with Sandy regarding INR result and instructed:     Warfarin Dosing Instructions:  Change warfarin dose to 7.5 mg daily on MON; and 10 mg daily rest of week  (8 % change)    Instructed patient to follow up no later than: 1 week    Education provided: target INR goal and significance of current INR result and importance of following up for INR monitoring at instructed interval    Sandy verbalizes understanding and agrees to warfarin dosing plan.    Instructed to call the [facility] for any changes, questions or concerns. (#[phone])   ?   Caitlyn Posey RN    Subjective/Objective:      Sandymelania Spencer, a 78 y.o. female is on warfarin.     Sandy reports:     Home warfarin dose: verbally confirmed home dose with Sandy and updated on anticoagulation calendar     Missed doses: No     Medication changes:  No     S/S of bleeding or thromboembolism:  No     New Injury or illness:  No     Changes in diet or alcohol consumption:  No     Upcoming surgery, procedure or cardioversion:  No    Anticoagulation Episode Summary     Current INR goal:   2.0-3.0   TTR:   18.0 % (2.7 mo)   Next INR check:   7/1/2019   INR from last check:   1.90! (6/25/2019)   Weekly max warfarin dose:      Target end date:      INR check location:      Preferred lab:      Send INR reminders to:   [facility]    Indications    Other chronic pulmonary embolism without acute cor pulmonale (H) [I27.82]           Comments:            Anticoagulation Care Providers     Provider Role Specialty Phone number    Caitlyn Rees MD Referring Family Medicine  [phone]

## 2021-05-31 VITALS — WEIGHT: 167.6 LBS | HEIGHT: 63 IN | BODY MASS INDEX: 29.7 KG/M2

## 2021-05-31 VITALS — HEIGHT: 63 IN | BODY MASS INDEX: 28.88 KG/M2 | WEIGHT: 163 LBS

## 2021-05-31 VITALS — WEIGHT: 156 LBS | HEIGHT: 64 IN | BODY MASS INDEX: 26.63 KG/M2

## 2021-05-31 VITALS — BODY MASS INDEX: 28 KG/M2 | HEIGHT: 63 IN | WEIGHT: 158 LBS

## 2021-05-31 VITALS — WEIGHT: 164 LBS | BODY MASS INDEX: 28.6 KG/M2

## 2021-05-31 VITALS — BODY MASS INDEX: 28 KG/M2 | WEIGHT: 158 LBS | HEIGHT: 63 IN

## 2021-05-31 VITALS — BODY MASS INDEX: 28.1 KG/M2 | HEIGHT: 64 IN | WEIGHT: 164.6 LBS

## 2021-05-31 VITALS — BODY MASS INDEX: 27.9 KG/M2 | HEIGHT: 64 IN

## 2021-05-31 VITALS — HEIGHT: 63 IN | WEIGHT: 165 LBS | BODY MASS INDEX: 29.23 KG/M2

## 2021-05-31 VITALS — WEIGHT: 158 LBS | HEIGHT: 63 IN | BODY MASS INDEX: 28 KG/M2

## 2021-05-31 VITALS — BODY MASS INDEX: 27.31 KG/M2 | HEIGHT: 64 IN | WEIGHT: 160 LBS

## 2021-05-31 VITALS — HEIGHT: 64 IN | BODY MASS INDEX: 27.9 KG/M2

## 2021-05-31 VITALS — HEIGHT: 63 IN | WEIGHT: 163 LBS | BODY MASS INDEX: 28.88 KG/M2

## 2021-05-31 VITALS — WEIGHT: 164 LBS | BODY MASS INDEX: 28 KG/M2 | HEIGHT: 64 IN

## 2021-05-31 VITALS — WEIGHT: 158 LBS | BODY MASS INDEX: 27.99 KG/M2

## 2021-05-31 VITALS — WEIGHT: 160 LBS | BODY MASS INDEX: 27.31 KG/M2 | HEIGHT: 64 IN

## 2021-05-31 VITALS — WEIGHT: 163.3 LBS | BODY MASS INDEX: 29.39 KG/M2

## 2021-05-31 VITALS — BODY MASS INDEX: 27.31 KG/M2 | WEIGHT: 160 LBS | HEIGHT: 64 IN

## 2021-05-31 VITALS — HEIGHT: 64 IN | BODY MASS INDEX: 28 KG/M2 | WEIGHT: 164 LBS

## 2021-05-31 VITALS — HEIGHT: 63 IN | BODY MASS INDEX: 28.65 KG/M2 | WEIGHT: 161.7 LBS

## 2021-05-31 VITALS — HEIGHT: 64 IN | BODY MASS INDEX: 27.31 KG/M2 | WEIGHT: 160 LBS

## 2021-05-31 VITALS — WEIGHT: 161.3 LBS | BODY MASS INDEX: 28.12 KG/M2

## 2021-05-31 VITALS — HEIGHT: 64 IN | BODY MASS INDEX: 28.51 KG/M2 | WEIGHT: 167 LBS

## 2021-05-31 VITALS — HEIGHT: 64 IN | BODY MASS INDEX: 27.79 KG/M2 | WEIGHT: 162.8 LBS

## 2021-05-31 VITALS — BODY MASS INDEX: 29.14 KG/M2 | WEIGHT: 167.1 LBS

## 2021-05-31 VITALS — BODY MASS INDEX: 28.44 KG/M2 | WEIGHT: 163.1 LBS

## 2021-05-31 VITALS — BODY MASS INDEX: 28.17 KG/M2 | HEIGHT: 64 IN | WEIGHT: 165 LBS

## 2021-05-31 VITALS — BODY MASS INDEX: 28.88 KG/M2 | WEIGHT: 163 LBS | HEIGHT: 63 IN

## 2021-05-31 VITALS — WEIGHT: 161 LBS | HEIGHT: 63 IN | BODY MASS INDEX: 28.53 KG/M2

## 2021-05-31 VITALS — BODY MASS INDEX: 28 KG/M2 | WEIGHT: 164 LBS | HEIGHT: 64 IN

## 2021-05-31 VITALS — BODY MASS INDEX: 27.99 KG/M2 | HEIGHT: 63 IN

## 2021-05-31 VITALS — BODY MASS INDEX: 28.53 KG/M2 | HEIGHT: 63 IN | WEIGHT: 161 LBS

## 2021-05-31 VITALS — BODY MASS INDEX: 27.49 KG/M2 | WEIGHT: 161 LBS | HEIGHT: 64 IN

## 2021-05-31 VITALS — WEIGHT: 158 LBS | BODY MASS INDEX: 28 KG/M2 | HEIGHT: 63 IN

## 2021-05-31 VITALS — WEIGHT: 161.6 LBS | BODY MASS INDEX: 28.63 KG/M2 | HEIGHT: 63 IN

## 2021-05-31 VITALS — HEIGHT: 63 IN | BODY MASS INDEX: 28 KG/M2 | WEIGHT: 158 LBS

## 2021-05-31 NOTE — PATIENT INSTRUCTIONS - HE
1. Talk to dr Lynn about a trial of baclofen for muscle pain    2. Ice your elbow    3. Try Co Q10 to see if it helps with your pain
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
## 2021-05-31 NOTE — TELEPHONE ENCOUNTER
ANTICOAGULATION  MANAGEMENT    Assessment     Today's INR result of 1.80 is Subtherapeutic (goal INR of 2.0-3.0)        Warfarin taken as previously instructed    No new diet changes affecting INR    No new medication/supplements affecting INR    Continues to tolerate warfarin with no reported s/s of bleeding or thromboembolism     Previous two INR results have been Therapeutic    Plan:     Left a detailed message for Sandy regarding INR result and instructed:     Warfarin Dosing Instructions:  Take a boost does of 10 mg today;  then continue current warfarin dose 7.5 mg daily on MON / THUR / SAT; and 10 mg daily rest of week  (0 % change)    Instructed patient to follow up no later than: 1-2 weeks    Education provided: target INR goal and significance of current INR result    Instructed to call the [facility] for any changes, questions or concerns. (#[phone])   ?   Caitlyn Posey RN    Subjective/Objective:      Sandy Spencer, a 78 y.o. female is on warfarin.     Sandy reports:     Home warfarin dose: as updated on anticoagulation calendar per template     Missed doses: No     Medication changes:  No     S/S of bleeding or thromboembolism:  No     New Injury or illness:  No     Changes in diet or alcohol consumption:  No     Upcoming surgery, procedure or cardioversion:  No    Anticoagulation Episode Summary     Current INR goal:   2.0-3.0   TTR:   35.8 % (4.5 mo)   Next INR check:   9/3/2019   INR from last check:   1.80! (8/19/2019)   Weekly max warfarin dose:      Target end date:      INR check location:      Preferred lab:      Send INR reminders to:   [facility]    Indications    Other chronic pulmonary embolism without acute cor pulmonale (H) [I27.82]           Comments:            Anticoagulation Care Providers     Provider Role Specialty Phone number    Caitlyn Rees MD Referring Family Medicine [phone]

## 2021-05-31 NOTE — PROGRESS NOTES
[facility] Daily Progress     Patient Name: Sandy Spencer  Date: 8/15/2019  Visit #: [id number]       Referral Diagnosis: B knee pain HA;s Neck Pain  Referring provider: Michael Ramirez DO  Visit Diagnosis: R LE pain, R Knee Pain    ICD-10-CM    1. 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 Chronic pain of right knee M25.561     G89.29    3. Lumbar radiculopathy M54.16    4. Complex regional pain syndrome type 1 of both lower extremities G90.523    5. Acute right-sided low back pain without sciatica M54.5    6. Chronic pain syndrome G89.4    7. Chronic cluster headache, not intractable G44.029    8. Reflex sympathetic dystrophy G90.50    9. Temporomandibular joint-pain-dysfunction syndrome (TMJ) M26.629          Assessment:     Patient is benefitting from skilled physical therapy and is making steady progress toward functional goals.  Patient is appropriate to continue with skilled physical therapy intervention, as indicated by initial plan of care.   She did have improvement in her fascial tensions with treatment. This should help to improve sinus drainage and decrease HA's.     Goal Status:  Pt. will demonstrate/verbalize independence in self-management of condition in : 12 weeks  Pt. will be independent with home exercise program in : 12 weeks  Pt. will have improved quality of sleep: with less pain;waking less times/night;in 6 weeks  Pt. will show improved balance for safer : ambulation;standing;for dressing/grooming;for chores;for community ambulation;for exercise/recreation;independently;in 12 weeks  Pt. will be able to walk : 30 minutes;on uneven/inclined surfaces;1 mile;with less pain;with less difficulty;for community mobility;for exercise/recreation;in 12 weeks  Pt. will bend: to dress;to clean;with less pain;with less difficulty;for self care;for exercise/recreation;in 12 weeks  Patient will ascend / descend: stairs;step;curb;without railing;with recipricol gait;without assistive  device;independently;with less pain;with less difficulty;in 12 weeks  Patient will sit: 60 minutes;for driving;for watching TV;for other activity;with less pain;with less difficultty;in 12 weeks  Patient will transfer: sit/stand;supine/sit;floor/stand;for toileting;for in/out of bed;for in/out of chair;for other activity;with less pain;with less difficulty;in 12 weeks    No data recorded    Plan / Patient Education:     Plan to con't with manual therapy to decrease fascial tension/tone to normalize ROM/decrease inflammation/decrease mm tone and improve proprioception.      Subjective:     Pain Ratin/10  L low back  burning pain Neck and HA pain            Objective:     SI joints are level,   TMJ restriction   Dural restrictions C3 T12    Treatment Today   8/15/2019   TREATMENT MINUTES COMMENTS   Evaluation     Self-care/ Home management          Manual therapy 65 MFR with OA, L5-SI and SI joint releases, Dural Tube Pull.MFR to the head and neck,  SCS to Digastric R,   SCS to Lat & Med Ptergyoids.   Zygomatic  KTing of Thoracic, LE RSD Sciatic nerve with L4-5 disc  KTing of bilat knees     Releases achieved   HA and LE pain resolved.   TMJ pain resolved  Clicking reduced.     Neuromuscular Re-education     Therapeutic Activity     herapeutic Exercises     Gait training     Modality__________________                Total 65    Blank areas are intentional and mean the treatment did not include these items.       Yogi Velazquez  8/15/2019
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
## 2021-05-31 NOTE — TELEPHONE ENCOUNTER
"ANTICOAGULATION  MANAGEMENT    Assessment     Today's INR result of 2.8 is Therapeutic (goal INR of 2.0-3.0)        Warfarin taken as previously instructed    No new diet changes affecting INR    No interaction expected between valacyclovir and warfarin    Potential interaction between new calcium supplement and warfarin which may affect subsequent INRs, she will probably not start this until next week    Continues to tolerate warfarin with no reported s/s of bleeding or thromboembolism     Previous INR was Subtherapeutic    Plan:     Spoke with Sandy regarding INR result and instructed:     Warfarin Dosing Instructions:  Continue current warfarin dose    7.5 mg every Mon, Thu, Sat; 10 mg all other days     Instructed patient to follow up no later than: 2 weeks    Education provided: target INR goal and significance of current INR result, potential interaction between warfarin and calcium supplement and no interaction anticipated between warfarin and valacyclovir    Sandy verbalizes understanding and agrees to warfarin dosing plan.    Instructed to call the [facility] for any changes, questions or concerns. (#[phone])   ?   Shey Tilley RN    Subjective/Objective:      Sandy Stoutton, a 78 y.o. female is on warfarin.     Sandy reports:     Home warfarin dose: as updated on anticoagulation calendar per template     Missed doses: No     Medication changes:  Yes: she had a course of valtrex recently for shingles. States she will be starting a new calcium supplement soon that she suspects contains some Vit K and she is anticipating this may affect her INRs     S/S of bleeding or thromboembolism:  No     New Injury or illness:  Yes: shingles recently, and states \"I just feel so icky lately\"     Changes in diet or alcohol consumption:  No     Upcoming surgery, procedure or cardioversion:  No    Anticoagulation Episode Summary     Current INR goal:   2.0-3.0   TTR:   40.2 %   Next INR check:   9/17/2019   INR " from last check:   2.80 (9/3/2019)   Weekly max warfarin dose:      Target end date:      INR check location:      Preferred lab:      Send INR reminders to:   [facility]    Indications    Other chronic pulmonary embolism without acute cor pulmonale (H) [I27.82]           Comments:            Anticoagulation Care Providers     Provider Role Specialty Phone number    Caitlyn Rees MD Referring Family Medicine [phone]

## 2021-05-31 NOTE — TELEPHONE ENCOUNTER
Spoke with Sandy. No INR was documented by the Pain Clinic on 8/13/19. Patient states INR was 3.00.    Sandy is scheduled for an INR on Monday 8/19/19.    Caitlyn Posey RN, [facility] Anticoagulation Nurse  [phone]

## 2021-05-31 NOTE — TELEPHONE ENCOUNTER
Prior Authorization Request  Who s requesting:  Nikunj Lynn MD/ Sammie Velasco CMA  Pharmacy Name and Location: [facility]- [address]  Medication Name: Methocarbamol 500mg  Insurance Plan: Medicare  Insurance Member ID Number:  [id number]  Informed patient that prior authorizations can take up to 10 business days for response:   Yes  Okay to leave a detailed message: Yes

## 2021-05-31 NOTE — PROGRESS NOTES
ASSESSMENT/PLAN:       1. Cluster headache, not intractable, unspecified chronicity pattern  -The patient continues to struggle quite a bit with her headaches, and has seen neurology in the past but given all of her other medical issues has not been in recently.  Given her persistent headaches with the Topamax 200 mg orally daily and her comorbid conditions I am referring her back to neurology for further evaluation and assessment.  She has been told to consider a radiofrequency ablation to help with her headaches and at this point given the pain she is strongly considering this.  -I discussed with the patient we could certainly decrease her Topamax dosing to see if this helps with her mood that she is finally getting relief from her headaches she does not want to change the dosage.  - Ambulatory referral to Neurology    2. Chronic left-sided thoracic back pain  -Thankfully PT seems to have been able to identify at least where the pain is coming from, and given some manipulation through physical therapy has had some relief in her pain.  I recommended she follow-up with the pain clinic on this to see if she would qualify for some sort of injection in this area, and she is an established patient at the spine clinic as well and certainly they may be able to help her with further relief in this thoracic area.    3. Sinus pain  -Persistent sinus discomfort, that has not gotten better with antibiotic treatment or allergy medication.  Referral to ear nose and throat placed for further evaluation.  - Ambulatory referral to ENT    4. Moderate episode of recurrent major depressive disorder (H)  -The patient feels more depressed than she has had previously.  She thinks most of this is related to her pain and we did lay out a clear plan on how to further address the pains as listed above.  She states she is safe and would never hurt herself.  She does understand to call 911 if this starts changing.  In the past I recommended  mental health, and she does have a therapist through the pain clinic that she would like to see that this person is booked out until October.  Additionally recommended considering psychiatric treatment in the past but her and her pain specialist had decided that he would help manage her medications from a mental health perspective.  She does see him in September.  Back for now continue to monitor with close follow-up, she will call me if things worsen and we will continue to manage her other medical issues.    5. Pulmonary embolism (H)    - INR      Return in about 4 weeks (around 9/2/2019) for recheck.    40 minutes was spent face-to-face with the patient during this encounter and >50% of this was spent on counseling and coordination of care. We discussed in depth the topics listed above.       Caitlyn Rees MD      PROGRESS NOTE   8/5/2019    SUBJECTIVE:  Sandy Spencer is a 78 y.o. female  who presents for follow up.     She is getting quite depressed, and is feeling like she would be better off dead at times, but not actively suicidal.  This is been worse in the last month.  She does wonder if this is from the Topamax.  We discussed that she has not seen neurology in some time for her headaches and that this may be a good idea again.    She is happy to report that in working with physical therapy her pain on the left flank was gone for approximately 12 hours after some manipulation that he did.  Following this 12 hours it did come back with a vengeance but now she is happy to know that this is musculoskeletal in nature and not internal organ pain.  Her question now is how to manage this pain going forward.  She wonders if she would be a candidate for an injection in this area to help with the pain.  Most recently she has been quite debilitated secondary to this thoracic pain, and was crying for 3 days secondary to it.       She does get injections in her knees tomorrow. She does need these, has for  6-8 months.  She is getting the rooster comb injections. She did not tolerate the geniculate block. She does think that it was T7-8 and t10.       She continues to follow with the pain clinic.  She is changing the fentanyl patches every 2 days, does not feel that they are working. She hasn't taken morphine for 2.5 months. She is going in to see pain clinic again in September. Has been having issues getting a tens unit.     She does worry about allergies. She does have Claritin maybe that she takes and uses the astepro as needed. She does still have left maxillary pressure and muffling in her left ear. She does wonder about allergies.  She did take antibiotics and this did not help with the pressure at all.  She continues to struggle with headaches as well different than the sinus pressure.  Chief Complaint   Patient presents with     Follow-up     Patient is here today for a three week follow up. Patient would like to review her PT visits.      INR Check     Suicidal     Patient states that for the past week she has been having suicidal thoughts, she is crying all of the time.          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     Temporomandibular joint-pain-dysfunction syndrome (TMJ)     Pancreatitis     Localized swelling of lower leg     Acute right-sided low back pain without sciatica     Acquired hypothyroidism     Chronic pain syndrome     Snoring      Diarrhea     Abdominal pain, generalized     Dermatochalasis of left eyelid     Bilateral carotid artery stenosis     Coronary artery disease involving native coronary artery of native heart without angina pectoris     Sinus bradycardia     Other chronic pulmonary embolism without acute cor pulmonale (H)     Splenic infarction     Opioid type dependence, continuous (H)     Hematuria     Anemia due to blood loss, acute     Hydronephrosis     Bladder spasms     Hypotension, unspecified hypotension type     Acute reaction to stress     Anxiety disorder due to medical condition     Family relationship problem     History of posttraumatic stress disorder (PTSD)     Generalized muscle weakness       Current Outpatient Medications   Medication Sig Dispense Refill     acetaminophen (TYLENOL) 500 MG tablet Take 650 mg by mouth 3 (three) times a day.              atorvastatin (LIPITOR) 80 MG tablet TAKE 1 TABLET(80 MG) BY MOUTH AT BEDTIME 90 tablet 1     azelastine (ASTEPRO) 0.15 % (205.5 mcg) Spry nasal spray Apply 1 spray into each nostril 2 (two) times a day as needed. 30 mL 11     calcium, as carbonate, (TUMS) 200 mg calcium (500 mg) chewable tablet Chew 1 tablet (200 mg total) 3 (three) times a day as needed. 30 tablet 0     cholecalciferol, vitamin D3, 5,000 unit Tab Take 1 tablet by mouth daily with supper.              cyanocobalamin, vitamin B-12, 5,000 mcg Subl Take 1 tablet by mouth.       cyanocobalamin/mecobalamin (CYANOCOBALAMIN-METHYLCOBALAMIN SL) Place 5,000 mcg under the tongue daily.       diclofenac sodium (VOLTAREN) 1 % Gel Apply 4 g topically. (apply to knees Q AM and Q 2pm and prn.  May self-administer)       diphenoxylate-atropine (LOMOTIL) 2.5-0.025 mg per tablet Take 1 tablet by mouth 4 (four) times a day as needed for diarrhea. 30 tablet 1     fentaNYL (DURAGESIC) 50 mcg/hr Place 1 patch on the skin every other day. 15 patch 0     ferrous sulfate 65 mg elemental iron Take 1 tablet by mouth daily with  breakfast.       GENERLAC 10 gram/15 mL solution TK 30ML PO QD (Patient taking differently: Take 30 mL by mouth daily as needed. TK 30ML PO QD      ) 236 mL 3     hydrOXYzine HCl (ATARAX) 10 MG tablet Take 1 tablet (10 mg total) by mouth 3 (three) times a day. 90 tablet 1     Lactobacillus acidoph-L.bulgar (FLORANEX) 1 million cell Tab tablet Take 1 tablet by mouth daily. 30 tablet 0     lamoTRIgine (LAMICTAL) 25 MG tablet Take 1 tablet (25 mg total) by mouth daily. 90 tablet 2     lamoTRIgine (LAMICTAL) 5 MG TChD Take 1 tablet by mouth 2 (two) times a day.  0     levothyroxine (LEVO-T) 50 MCG tablet Take 1 tablet (50 mcg total) by mouth Daily at 6:00 am. 30 tablet 3     loperamide (IMODIUM) 2 mg capsule Take 2 mg by mouth 4 (four) times a day as needed for diarrhea .        1     MAGNESIUM ORAL Take 400 mg by mouth daily.              promethazine (PHENERGAN) 12.5 MG tablet Take 1 tablet (12.5 mg total) by mouth every 6 (six) hours as needed for nausea. 20 tablet 1     rosuvastatin (CRESTOR) 40 MG tablet Take 1 tablet (40 mg total) by mouth daily. 30 tablet 3     senna-docusate (PERICOLACE) 8.6-50 mg tablet Take 2 tablets by mouth daily as needed. 30 tablet 0     SUMAtriptan (IMITREX) 50 MG tablet Take 1 tablet (50 mg total) by mouth every 2 (two) hours as needed for migraine. 27 tablet 1     topiramate (TOPAMAX) 50 MG tablet Take 1.5 tablets (75 mg total) by mouth at bedtime AND 1 tablet (50 mg total) every morning. 225 tablet 1     traZODone (DESYREL) 100 MG tablet TAKE 2 TO 4 TABLETS(200  MG) BY MOUTH AT BEDTIME AS NEEDED FOR SLEEP 360 tablet 0     warfarin (COUMADIN/JANTOVEN) 5 MG tablet Take one to two tablets (5 to 10 mg) by mouth daily. Adjust dose based on INR results as directed. 180 tablet 1     furosemide (LASIX) 20 MG tablet Take by mouth. (every other day PRN for weight gain of 3# in 24 hours or SBP >180)       KRILL OIL ORAL Take 350 mg by mouth daily.       morphine (MSIR) 15 MG tablet Take 0.5  tablets (7.5 mg total) by mouth 2 (two) times a day as needed for pain. 28 tablet 0     naloxone (NARCAN) 4 mg/actuation nasal spray 1 spray (4 mg dose) into one nostril for opioid reversal. Call 911. May repeat if no response in 3 minutes. 1 Box 0     Current Facility-Administered Medications   Medication Dose Route Frequency Provider Last Rate Last Dose     denosumab 60 mg (PROLIA 60 mg/ml)  60 mg Subcutaneous Q6 Months Andrei Olivera MD   60 mg at 19 1318       Social History     Tobacco Use   Smoking Status Former Smoker     Packs/day: 1.00     Years: 20.00     Pack years: 20.00     Last attempt to quit: 2000     Years since quittin.6   Smokeless Tobacco Never Used           OBJECTIVE:        Recent Results (from the past 240 hour(s))   INR   Result Value Ref Range    INR 2.10 (H) 0.90 - 1.10   INR   Result Value Ref Range    INR 2.50 (H) 0.90 - 1.10       Vitals:    19 1139   BP: 110/58   Patient Site: Right Arm   Patient Position: Sitting   Cuff Size: Adult Regular   Pulse: 84   Temp: 98.6  F (37  C)   TempSrc: Oral   SpO2: 99%   Weight: 122 lb 1.6 oz (55.4 kg)     Weight: 122 lb 1.6 oz (55.4 kg)          Physical Exam:  GENERAL APPEARANCE: A&A, NAD, well hydrated, well nourished  SKIN:  Normal skin turgor, no lesions/rashes   HEENT: moist mucous membranes, no rhinorrhea  NECK: Normal  CV: RRR, no M/G/R   LUNGS: CTAB  ABDOMEN: S&NT, no masses  Back: Left flank tenderness to palpation, no midline step-offs  EXTREMITY: no edema, Kinesiotape present on bilateral lower extremities  NEURO: no gross deficits, normal reflexes at the biceps, brachioradialis and patellar tendons  Psych: Anxious appearing, tearful at times, casually dressed and groomed, somewhat distractible

## 2021-05-31 NOTE — PROGRESS NOTES
Pt scheduled for bilateral knee injections with Synvisc. Pt hasn't noticed a great deal of difference in her knee pain yet.

## 2021-05-31 NOTE — PROGRESS NOTES
"HPI: This patient is a 79yo F who presents for evaluation of the sinuses at the request of Dr. Rees. The patient reports chronic left-sided facial pressure and ear pressure, which she calls \"sinus problems.\" However, she has never had episodes of high fever, localized sinus pain, tooth pain, and pururlent drainage. She has known left TMJ for decades and feels her left jaw click repeatedly throughout the day, sometimes having trouble even opening her mouth as a result of it. She has never seen a TMJ specialist for management of this. Her symptoms were treated with antibiotics, and not surprisingly, her symptoms were unaffected. Has some mild vasomotor rhinitis symptoms.     Past medical history, surgical history, social history, family history, medications, and allergies have been reviewed with the patient and are documented above.    Review of Systems: a 10-system review was performed. Pertinent positives are noted in the HPI and on a separate scanned document in the chart.    PHYSICAL EXAMINATION:  GEN: no acute distress, normocephalic  EYES: extraocular movements are intact, pupils are equal and round. Sclera clear.   EARS: auricles are normally formed. The external auditory canals are clear with minimal to no cerumen. Tympanic membranes are intact bilaterally with no signs of infection, effusion, retractions, or perforations.  NOSE: anterior nares are patent. There are no masses or lesions. The septum is non-obstructing. No percussive tenderness over the sinuses  OC/OP: clear, significant dental work. The tongue and palate are fully mobile and symmetric. No masses or lesions. S/p tonsillectomy  NECK: soft and supple. No lymphadenopathy or masses. Airway is midline. Left jaw crepitus significant and left TMJ pain on palpation  NEURO: CN VII and XII symmetric. alert and oriented. No spontaneous nystagmus. Gait is normal.  PULM: breathing comfortably on room air, normal chest expansion with respiration  CARDS: no " "cyanosis or clubbing. Normal carotid pulses.    CT HEAD 3/19: sinuses are clear    MEDICAL DECISION-MAKING: This patient is a 77yo F with left atypical facial pressure from known left TMJ arthritis and arthralgia. Discussed that only 3% of people with \"sinus headache\" actually have any sinus pathology to explain headache and that the other 97% are headache or TMJ. Given her exam findings, known longstanding TMJ, and completely normal sinuses on her CT head, she needs to see the TMJ clinic. Will send Rx for atrovent nasal spray for mild VMR.      "

## 2021-05-31 NOTE — PATIENT INSTRUCTIONS - HE
POST PROCEDURE INSTRUCTIONS      PAIN CENTER  PROCEDURE DONE TODAY: BILATERAL KNEE INJECTION WITH SYNVISC #1  Rest today. Resume activity tomorrow as able.  Patient demonstrates the ability to ambulate independently with a steady gait at the time of discharge.      It is not unusual to have a Temporary increase in your pain after a procedure.  You can use ice to the area 24-48 hrs for 15 minutes at a time.    You did not receive a steroid.    Low back or SI joint(sacroiliac) injection:  You may experience numbness in one or both legs.  Please walk with help.  This is temporary and will wear off in a few hours. Do not drive if you have tingling, numbness or weakness in your hands/arms or legs/feet.    Fever : call if fever 100 or over, redness/warmth/swelling over injections site.    No public hot tubs/pools for a couple days.    Physical therapy:  Check with Pain Center provider regarding resuming therapy.    Monitor you response to the injection and report this at your next appointment.    If you have been referred for a procedure only, please call your referring provider for a follow up.    Radio Frequency: may take up to 6 weeks before you will feel the full effects of this procedure.    CALL IF ANY QUESTIONS [phone]

## 2021-05-31 NOTE — PROGRESS NOTES
ASSESSMENT/PLAN:       1. Cluster headaches  -Flare of her headaches with her self decrease of Topamax at home.  We discussed going back to 75 mg orally twice daily and watching her mood closely.  She will plan on following up here in approximately 3 to 4 weeks for recheck and reevaluation.  She may need referral back to neurology for further discussion, but unfortunately she has been intolerant to the majority medications used to treat cluster headaches.  - topiramate (TOPAMAX) 50 MG tablet; Take 1.5 tablets (75 mg total) by mouth 2 (two) times a day.  Dispense: 270 tablet; Refill: 0    2. Moderate major depression (H)  -Symptoms are stable at this time, she continues to feel moderately depressed generally related to her physical deconditioning.  Continue to monitor for mood changes with the change in the Topamax.    3. Chronic kidney disease (CKD) stage G3a/A1, moderately decreased glomerular filtration rate (GFR) between 45-59 mL/min/1.73 square meter and albuminuria creatinine ratio less than 30 mg/g (H)  -Updating labs today given her medications to ensure things are not changing, I suspect her intake is poor as well.  - Vitamin D, Total (25-Hydroxy)  - Basic Metabolic Panel    4. Anemia, unspecified type  -Likely multifactorial but improving, updating her hemoglobin today.  - HM2(CBC w/o Differential)    5. Fatigue, unspecified type  -Long-standing, likely secondary to her chronic pain and poor sleep hygiene but updating labs as listed below.  - Vitamin D, Total (25-Hydroxy)  - Erythrocyte Sedimentation Rate  - C-Reactive Protein    6. Lumbar radiculopathy  -Stable symptoms today, providing the patient a prescription for a cane to use for stability  - Cane Single Tip    7. Arthritis of knee  -Sees the pain clinic for injections, obtaining Voltaren gel prescription today.  - diclofenac sodium (VOLTAREN) 1 % Gel; Apply 4 g topically 4 (four) times a day. (apply to knees Q AM and Q 2pm and prn.  May  self-administer)  Dispense: 100 g; Refill: 5    8. Pulmonary embolism (H)  - INR  -We discussed the possibility of adding a small dose of oral vitamin K to see if this would help stabilize her INR, she remains hesitant to do this but she is also restricting her diet so much I think this is contributing to the labile nature of her INR.  She will consider and discuss this with the Coumadin nurse as well.      Return for Next scheduled follow up.      Caitlyn Rees MD      PROGRESS NOTE   9/3/2019    SUBJECTIVE:  Sandy Spencer is a 78 y.o. female  who presents for follow up.     She does have right elbow pain, it feels like the bone hurts. Recalls no injury. Does have pain on the medial aspect of the elbow, and is there all the time.     She does feel icky inside, like her blood is off. She has no energy. She cancelled her injection for her TMJ on the left, she was too tired to go over to get that done. She does note that her appetite is down. She does still sleep poorly. She will take two trazodone and then go to bed, usually around 10 and then wake up around 0300 and then will cat nap the rest of the night.     She does have a shingles outbreak as well, about 1.5 weeks ago. She did feel them a few days before they broke out. She did start the Valtrex and that did help with her symptoms.     She does continue to struggle with her INR. She is not taking the Ensure or eating any lettuce as this causes trouble for her.    She does have her headaches, they are stable. She just decreased her topiramate, it was making her depressed.  Since then the headaches have increased some.  She was told she has TMJ dysfunction, and is looking at getting an injection in her TMJ but had to cancel this appointment secondary to her poor energy.  She feels like she has a migraine coming all the time but does not typically get a migraine with it.     She was evaluated by ENT as well as a lot of the pressure feels like it is  in her sinus, and was told her sinuses were normal so this was not her sinuses.  She did start a new nasal spray.         Chief Complaint   Patient presents with     Follow-up     Patient is here today for a one month follow up in regards to her depression, left side back pain and headaches.      Herpes Zoster     Patient states one week ago she had a out break of shingles on her left leg. Did start the Valacyclovir right away. Patient states the area has dried out but she still feels the pain and feels very fatigue.          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     Temporomandibular joint-pain-dysfunction syndrome (TMJ)     Pancreatitis     Localized swelling of lower leg     Acquired hypothyroidism     Chronic pain syndrome     Snoring     Diarrhea     Abdominal pain, generalized     Dermatochalasis of left eyelid     Bilateral carotid artery stenosis     Coronary artery disease involving native coronary artery of native heart without angina pectoris     Sinus bradycardia     Other chronic pulmonary embolism without acute cor pulmonale (H)     Splenic infarction     Opioid type dependence, continuous (H)     Hematuria     Anemia due to blood loss, acute     Hydronephrosis     Bladder spasms     Hypotension, unspecified hypotension type     Acute reaction to stress     Anxiety disorder due to medical condition     Family relationship problem     History of  posttraumatic stress disorder (PTSD)     Generalized muscle weakness     Myofascial pain     Moderate major depression (H)       Current Outpatient Medications   Medication Sig Dispense Refill     acetaminophen (TYLENOL) 500 MG tablet Take 650 mg by mouth 3 (three) times a day.              azelastine (ASTEPRO) 0.15 % (205.5 mcg) Spry nasal spray Apply 1 spray into each nostril 2 (two) times a day as needed. 30 mL 11     cholecalciferol, vitamin D3, 5,000 unit Tab Take 1 tablet by mouth daily with supper.              cyanocobalamin, vitamin B-12, 5,000 mcg Subl Take 1 tablet by mouth.       diclofenac sodium (VOLTAREN) 1 % Gel Apply 4 g topically 4 (four) times a day. (apply to knees Q AM and Q 2pm and prn.  May self-administer) 100 g 5     diphenoxylate-atropine (LOMOTIL) 2.5-0.025 mg per tablet Take 1 tablet by mouth 4 (four) times a day as needed for diarrhea. 30 tablet 1     fentaNYL (DURAGESIC) 50 mcg/hr Place 1 patch on the skin every other day. 15 patch 0     ferrous sulfate 65 mg elemental iron Take 1 tablet by mouth daily with breakfast.       hydrOXYzine HCl (ATARAX) 10 MG tablet Take 1 tablet (10 mg total) by mouth 3 (three) times a day. 90 tablet 1     ipratropium (ATROVENT) 0.03 % nasal spray 2 sprays into each nostril 2 (two) times a day. 30 mL 11     Lactobacillus acidoph-L.bulgar (FLORANEX) 1 million cell Tab tablet Take 1 tablet by mouth daily. 30 tablet 0     lamoTRIgine (LAMICTAL) 25 MG tablet Take 1 tablet (25 mg total) by mouth daily. 90 tablet 2     lamoTRIgine (LAMICTAL) 5 MG TChD Take 1 tablet by mouth 2 (two) times a day.  0     levothyroxine (LEVO-T) 50 MCG tablet Take 1 tablet (50 mcg total) by mouth Daily at 6:00 am. 30 tablet 3     MAGNESIUM ORAL Take 400 mg by mouth daily.              methocarbamol (ROBAXIN) 500 MG tablet Take 1 tablet (500 mg total) by mouth 4 (four) times a day. 90 tablet 0     naloxone (NARCAN) 4 mg/actuation nasal spray 1 spray (4 mg dose) into one nostril for  opioid reversal. Call 911. May repeat if no response in 3 minutes. 1 Box 0     promethazine (PHENERGAN) 12.5 MG tablet Take 1 tablet (12.5 mg total) by mouth every 6 (six) hours as needed for nausea. 20 tablet 1     rosuvastatin (CRESTOR) 40 MG tablet Take 1 tablet (40 mg total) by mouth daily. 30 tablet 3     senna-docusate (PERICOLACE) 8.6-50 mg tablet Take 2 tablets by mouth daily as needed. 30 tablet 0     SUMAtriptan (IMITREX) 50 MG tablet Take 1 tablet (50 mg total) by mouth every 2 (two) hours as needed for migraine. 27 tablet 1     topiramate (TOPAMAX) 50 MG tablet Take 1.5 tablets (75 mg total) by mouth 2 (two) times a day. 270 tablet 0     traZODone (DESYREL) 100 MG tablet TAKE 2 TO 4 TABLETS(200  MG) BY MOUTH AT BEDTIME AS NEEDED FOR SLEEP 360 tablet 0     valACYclovir (VALTREX) 1000 MG tablet Take 1 tablet (1,000 mg total) by mouth 2 (two) times a day.  0     warfarin (COUMADIN/JANTOVEN) 5 MG tablet Take one to two tablets (5 to 10 mg) by mouth daily. Adjust dose based on INR results as directed. 180 tablet 1     Current Facility-Administered Medications   Medication Dose Route Frequency Provider Last Rate Last Dose     denosumab 60 mg (PROLIA 60 mg/ml)  60 mg Subcutaneous Q6 Months Andrei Olivera MD   60 mg at 19 1318       Social History     Tobacco Use   Smoking Status Former Smoker     Packs/day: 1.00     Years: 20.00     Pack years: 20.00     Last attempt to quit: 2000     Years since quittin.6   Smokeless Tobacco Never Used           OBJECTIVE:        Recent Results (from the past 240 hour(s))   INR   Result Value Ref Range    INR 2.80 (H) 0.90 - 1.10   Vitamin D, Total (25-Hydroxy)   Result Value Ref Range    Vitamin D, Total (25-Hydroxy) 77.5 30.0 - 80.0 ng/mL   HM2(CBC w/o Differential)   Result Value Ref Range    WBC 4.6 4.0 - 11.0 thou/uL    RBC 3.68 (L) 3.80 - 5.40 mill/uL    Hemoglobin 12.5 12.0 - 16.0 g/dL    Hematocrit 35.7 35.0 - 47.0 %    MCV 97 80 - 100 fL     MCH 33.9 27.0 - 34.0 pg    MCHC 34.9 32.0 - 36.0 g/dL    RDW 12.9 11.0 - 14.5 %    Platelets 188 140 - 440 thou/uL    MPV 7.3 7.0 - 10.0 fL   Basic Metabolic Panel   Result Value Ref Range    Sodium 138 136 - 145 mmol/L    Potassium 4.0 3.5 - 5.0 mmol/L    Chloride 107 98 - 107 mmol/L    CO2 20 (L) 22 - 31 mmol/L    Anion Gap, Calculation 11 5 - 18 mmol/L    Glucose 83 70 - 125 mg/dL    Calcium 9.0 8.5 - 10.5 mg/dL    BUN 18 8 - 28 mg/dL    Creatinine 1.26 (H) 0.60 - 1.10 mg/dL    GFR MDRD Af Amer 50 (L) >60 mL/min/1.73m2    GFR MDRD Non Af Amer 41 (L) >60 mL/min/1.73m2   Erythrocyte Sedimentation Rate   Result Value Ref Range    Sed Rate 10 0 - 20 mm/hr   C-Reactive Protein   Result Value Ref Range    CRP <0.1 0.0 - 0.8 mg/dL       Vitals:    09/03/19 1437   BP: 124/64   Patient Site: Left Arm   Patient Position: Sitting   Cuff Size: Adult Regular   Pulse: 70   Temp: 98.9  F (37.2  C)   SpO2: 98%   Weight: 121 lb 11.2 oz (55.2 kg)     Weight: 121 lb 11.2 oz (55.2 kg)          Physical Exam:  GENERAL APPEARANCE: A&A, NAD, well hydrated, well nourished  SKIN:  Normal skin turgor, no lesions/rashes   HEENT: moist mucous membranes, no rhinorrhea, sinuses are tender to palpation on the left, mucous memories are moist, pharynx is unremarkable  NECK: Normal  CV: RRR, no M/G/R   LUNGS: CTAB  ABDOMEN: S&NT, no masses   EXTREMITY: no edema   NEURO: no gross deficits  Psych: Her affect is flat, her eye contact is normal, her thought process and speech pattern are normal, no obvious hallucinations, not tearful today

## 2021-05-31 NOTE — PROGRESS NOTES
[facility] Daily Progress     Patient Name: Sandy Spencer  Date: 7/31/2019  Visit #: 8       Referral Diagnosis: B knee pain  Referring provider: Caitlyn Rees*  Visit Diagnosis: R LE pain, R Knee Pain    ICD-10-CM    1. 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 Chronic pain of right knee M25.561     G89.29    3. Localized swelling of lower leg R22.40    4. Lumbar radiculopathy M54.16    5. Reflex sympathetic dystrophy G90.50    6. Complex regional pain syndrome type 1 of both lower extremities G90.523          Assessment:     Patient is benefitting from skilled physical therapy and is making steady progress toward functional goals.  Patient is appropriate to continue with skilled physical therapy intervention, as indicated by initial plan of care.   She did have improvement in her fascial tensions with treatment. This should help to improve sinus drainage and decrease HA's.     Goal Status:  Pt. will demonstrate/verbalize independence in self-management of condition in : 12 weeks  Pt. will be independent with home exercise program in : 12 weeks  Pt. will have improved quality of sleep: with less pain;waking less times/night;in 6 weeks  Pt. will show improved balance for safer : ambulation;standing;for dressing/grooming;for chores;for community ambulation;for exercise/recreation;independently;in 12 weeks  Pt. will be able to walk : 30 minutes;on uneven/inclined surfaces;1 mile;with less pain;with less difficulty;for community mobility;for exercise/recreation;in 12 weeks  Pt. will bend: to dress;to clean;with less pain;with less difficulty;for self care;for exercise/recreation;in 12 weeks  Patient will ascend / descend: stairs;step;curb;without railing;with recipricol gait;without assistive device;independently;with less pain;with less difficulty;in 12 weeks  Patient will sit: 60 minutes;for driving;for watching TV;for other activity;with less pain;with less difficultty;in  12 weeks  Patient will transfer: sit/stand;supine/sit;floor/stand;for toileting;for in/out of bed;for in/out of chair;for other activity;with less pain;with less difficulty;in 12 weeks    No data recorded    Plan / Patient Education:     Plan to con't with manual therapy to decrease fascial tension/tone to normalize ROM/decrease inflammation/decrease mm tone and improve proprioception.      Subjective:     Pain Ratin  L Head pain stuffiness    4-6/10 to LE   When wearing the Kinesio Tape Pain  2-3/10         Objective:     Bilat SI dysf withL SI upslip,    Dural restrictions to C3,5,T3,12      Treatment Today   2019   TREATMENT MINUTES COMMENTS   Evaluation     Self-care/ Home management          Manual therapy 60 Elastic Taping of R Sciatic nerves  L5-S1    MFR with OA, L5-SI and SI joint releases, Dural Tube Pull,   Oscillation release to the spine.  SCS to ALL L T8,9,10-MS,   SCS to PG T8,9,10-N L    SCS to Sigmoid med, Lat and inf.    Releases achieved,   SI dysf resolved,   L lower thoracic pain resolved 0/10   LBP resolved    R LE, Knee pain resolved with oscillation release.          Neuromuscular Re-education     Therapeutic Activity     herapeutic Exercises     Gait training     Modality__________________                Total 60    Blank areas are intentional and mean the treatment did not include these items.       Yogi Velazquez  2019

## 2021-05-31 NOTE — PROGRESS NOTES
[facility] Daily Progress     Patient Name: Sandy Spencer  Date: 8/8/2019  Visit #: 9       Referral Diagnosis: B knee pain  Referring provider: Michael Ramirez DO  Visit Diagnosis: R LE pain, R Knee Pain    ICD-10-CM    1. 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 Chronic pain of right knee M25.561     G89.29    3. Localized swelling of lower leg R22.40    4. Lumbar radiculopathy M54.16    5. Complex regional pain syndrome type 1 of both lower extremities G90.523    6. Acute right-sided low back pain without sciatica M54.5    7. Chronic pain syndrome G89.4          Assessment:     Patient is benefitting from skilled physical therapy and is making steady progress toward functional goals.  Patient is appropriate to continue with skilled physical therapy intervention, as indicated by initial plan of care.   She did have improvement in her fascial tensions with treatment. This should help to improve sinus drainage and decrease HA's.     Goal Status:  Pt. will demonstrate/verbalize independence in self-management of condition in : 12 weeks  Pt. will be independent with home exercise program in : 12 weeks  Pt. will have improved quality of sleep: with less pain;waking less times/night;in 6 weeks  Pt. will show improved balance for safer : ambulation;standing;for dressing/grooming;for chores;for community ambulation;for exercise/recreation;independently;in 12 weeks  Pt. will be able to walk : 30 minutes;on uneven/inclined surfaces;1 mile;with less pain;with less difficulty;for community mobility;for exercise/recreation;in 12 weeks  Pt. will bend: to dress;to clean;with less pain;with less difficulty;for self care;for exercise/recreation;in 12 weeks  Patient will ascend / descend: stairs;step;curb;without railing;with recipricol gait;without assistive device;independently;with less pain;with less difficulty;in 12 weeks  Patient will sit: 60 minutes;for driving;for watching TV;for other  activity;with less pain;with less difficultty;in 12 weeks  Patient will transfer: sit/stand;supine/sit;floor/stand;for toileting;for in/out of bed;for in/out of chair;for other activity;with less pain;with less difficulty;in 12 weeks    No data recorded    Plan / Patient Education:     Plan to con't with manual therapy to decrease fascial tension/tone to normalize ROM/decrease inflammation/decrease mm tone and improve proprioception.      Subjective:     Pain Ratin/10  L low back  burning pain   Pain to the L Medial elbow  Shineiness to the skin mof the palms of the hands         Objective:     SI joints are level,   TMJ restriction   Dural restrictions C3 T12    Treatment Today   2019   TREATMENT MINUTES COMMENTS   Evaluation     Self-care/ Home management          Manual therapy 65 MFR with OA, L5-SI and SI joint releases, Dural Tube Pull.MFR to the head and neck,  SCS [id number],   [id number],   [id number],   KTing of bilat Sciatic Nerves, L5-SI  Disc   SCS to Renal Vein    Releases achieved   Pain to the L LB reduced to 0/10   Neuromuscular Re-education     Therapeutic Activity     herapeutic Exercises     Gait training     Modality__________________                Total 65    Blank areas are intentional and mean the treatment did not include these items.       Yogi Velazquez  2019

## 2021-05-31 NOTE — PATIENT INSTRUCTIONS - HE
POST PROCEDURE INSTRUCTIONS      PAIN CENTER  PROCEDURE DONE TODAY: BILATERAL KNEE INJECTION WITH SYNVISC #2  Rest today. Resume activity tomorrow as able.  Patient demonstrates the ability to ambulate independently with a steady gait at the time of discharge.      It is not unusual to have a Temporary increase in your pain after a procedure.  You can use ice to the area 24-48 hrs for 15 minutes at a time.    You did not receive a steroid.    Low back or SI joint(sacroiliac) injection:  You may experience numbness in one or both legs.  Please walk with help.  This is temporary and will wear off in a few hours. Do not drive if you have tingling, numbness or weakness in your hands/arms or legs/feet.    Fever : call if fever 100 or over, redness/warmth/swelling over injections site.    No public hot tubs/pools for a couple days.    Physical therapy:  Check with Pain Center provider regarding resuming therapy.    Monitor you response to the injection and report this at your next appointment.    If you have been referred for a procedure only, please call your referring provider for a follow up.    Radio Frequency: may take up to 6 weeks before you will feel the full effects of this procedure.    CALL IF ANY QUESTIONS [phone]

## 2021-05-31 NOTE — TELEPHONE ENCOUNTER
Triage call:   Patient calling with suspicions that she has shingles on the back of her left leg. She reports that she has had Shingles before and this is the 10th time. She reports that she has gotten both vaccines    Hx lost kidney Feb 20th. Reflex sympathetic dystrophy- since 89    Symptoms Started Thursday: getting burning down left leg and sciatic nerve in her left leg- yesterday started feeling a lump and today she noticed that she has an open sore and it is weeping. Area is sore and sensitive- tender to the touch.    Patient states that she called her insurance and the nurse that she spoke to told her than she would need to have her on hand dose of Valacyclovir dose adjusted as she only has one kidney.     Triaged to be seen within 24 hours- patient declines being seen as she feels she knows what this is.     Patient requested call to on call physician Paged to on call physician @ 9:02 pm- Dr William returned call @ 9:08pm - physician will look into adjusting dose and will call writer back. Dr William returned call:   Dose adjustment: 1g by mouth two times a day for 7 days. Patient is to follow up in clinic if the rash gets worse.     Called patient and reviewed provider instructions- patient verbalizes understanding and will start the medication this evening per dose adjustment instructions. She states that she has an appointment with her PCP 9/3/19 and will call if she needs to get in sooner.     Liz Jackson RN [facility] Triage/Med Refill 8/24/2019 9:26 PM      Reason for Disposition    [1] Localized rash is very painful AND [2] no fever    Protocols used: RASH OR REDNESS - [id number]

## 2021-05-31 NOTE — TELEPHONE ENCOUNTER
Central PA team  [phone]  Pool: [facility] PA MED ([id number])          PA has been initiated.       PA form completed and faxed insurance via Cover My Meds     Key:  [id number]     Medication:  METHOCARBAMOL 500MG    Insurance:  [employer] PART D         Response will be received via fax and may take up to 5-10 business days depending on plan

## 2021-05-31 NOTE — TELEPHONE ENCOUNTER
ANTICOAGULATION  MANAGEMENT    Assessment     Today's INR result of 2.50 is Therapeutic (goal INR of 2.0-3.0)        Warfarin taken as previously instructed    No new diet changes affecting INR    No new medication/supplements affecting INR    Continues to tolerate warfarin with no reported s/s of bleeding or thromboembolism     Previous INR was Therapeutic    Plan:     Left a detailed message for Sandy regarding INR result and instructed:     Warfarin Dosing Instructions:  Continue current warfarin dose 7.5 mg daily on MON / THUR / SAT; and 10 mg daily rest of week  (0 % change)    Instructed patient to follow up no later than: 1-2 weeks    Education provided: target INR goal and significance of current INR result    Instructed to call the [facility] for any changes, questions or concerns. (#[phone])   ?   Caitlyn Posey RN    Subjective/Objective:      Sandy Spencer, a 78 y.o. female is on warfarin.     Sandy reports:     Home warfarin dose: as updated on anticoagulation calendar per template     Missed doses: No     Medication changes:  No     S/S of bleeding or thromboembolism:  No     New Injury or illness:  No     Changes in diet or alcohol consumption:  No     Upcoming surgery, procedure or cardioversion:  No    Anticoagulation Episode Summary     Current INR goal:   2.0-3.0   TTR:   31.7 % (4.1 mo)   Next INR check:   8/19/2019   INR from last check:   2.50 (8/5/2019)   Weekly max warfarin dose:      Target end date:      INR check location:      Preferred lab:      Send INR reminders to:   [facility]    Indications    Other chronic pulmonary embolism without acute cor pulmonale (H) [I27.82]           Comments:            Anticoagulation Care Providers     Provider Role Specialty Phone number    Caitlyn Rees MD Referring Family Medicine [phone]

## 2021-05-31 NOTE — PATIENT INSTRUCTIONS - HE
POST PROCEDURE INSTRUCTIONS      PAIN CENTER  PROCEDURE DONE TODAY: BILATERAL KNEE INJECTION WITH SYNVISC #3  Rest today. Resume activity tomorrow as able.  Patient demonstrates the ability to ambulate independently with a steady gait at the time of discharge.      It is not unusual to have a Temporary increase in your pain after a procedure.  You can use ice to the area 24-48 hrs for 15 minutes at a time.    You did not receive a steroid.    Low back or SI joint(sacroiliac) injection:  You may experience numbness in one or both legs.  Please walk with help.  This is temporary and will wear off in a few hours. Do not drive if you have tingling, numbness or weakness in your hands/arms or legs/feet.    Fever : call if fever 100 or over, redness/warmth/swelling over injections site.    No public hot tubs/pools for a couple days.    Physical therapy:  Check with Pain Center provider regarding resuming therapy.    Monitor you response to the injection and report this at your next appointment.    If you have been referred for a procedure only, please call your referring provider for a follow up.    Radio Frequency: may take up to 6 weeks before you will feel the full effects of this procedure.    CALL IF ANY QUESTIONS [phone]    OK TO RETURN IN 1 WEEK FOR LEFT TRIGEMINAL NERVE BLOCK

## 2021-05-31 NOTE — TELEPHONE ENCOUNTER
The patient has some questions regarding her high inr that she had taken at the pain clinic and she needs to know when she should be coming back in.  Please advise.

## 2021-06-01 ENCOUNTER — RECORDS - HEALTHEAST (OUTPATIENT)
Dept: ADMINISTRATIVE | Facility: CLINIC | Age: 81
End: 2021-06-01

## 2021-06-01 VITALS — HEIGHT: 62 IN | BODY MASS INDEX: 28.89 KG/M2 | WEIGHT: 157 LBS

## 2021-06-01 VITALS — BODY MASS INDEX: 27.82 KG/M2 | HEIGHT: 63 IN | WEIGHT: 157 LBS

## 2021-06-01 VITALS — HEIGHT: 62 IN | BODY MASS INDEX: 28.34 KG/M2 | WEIGHT: 154 LBS

## 2021-06-01 VITALS — HEIGHT: 62 IN | WEIGHT: 152 LBS | BODY MASS INDEX: 27.97 KG/M2

## 2021-06-01 VITALS — HEIGHT: 62 IN | WEIGHT: 149 LBS | BODY MASS INDEX: 27.42 KG/M2

## 2021-06-01 VITALS — WEIGHT: 157 LBS | HEIGHT: 62 IN | BODY MASS INDEX: 28.89 KG/M2

## 2021-06-01 VITALS — BODY MASS INDEX: 28.92 KG/M2 | WEIGHT: 158.1 LBS

## 2021-06-01 VITALS — WEIGHT: 149 LBS | HEIGHT: 62 IN | BODY MASS INDEX: 27.42 KG/M2

## 2021-06-01 VITALS — BODY MASS INDEX: 28.44 KG/M2 | WEIGHT: 155.5 LBS

## 2021-06-01 VITALS — HEIGHT: 62 IN | WEIGHT: 157 LBS | BODY MASS INDEX: 28.89 KG/M2

## 2021-06-01 VITALS — HEIGHT: 63 IN | BODY MASS INDEX: 27.82 KG/M2 | WEIGHT: 157 LBS

## 2021-06-01 VITALS — BODY MASS INDEX: 29.08 KG/M2 | WEIGHT: 158 LBS | HEIGHT: 62 IN

## 2021-06-01 VITALS — HEIGHT: 62 IN | WEIGHT: 151 LBS | BODY MASS INDEX: 27.79 KG/M2

## 2021-06-01 VITALS — BODY MASS INDEX: 28.16 KG/M2 | HEIGHT: 62 IN | WEIGHT: 153 LBS

## 2021-06-01 VITALS — HEIGHT: 62 IN | WEIGHT: 159 LBS | BODY MASS INDEX: 29.26 KG/M2

## 2021-06-01 VITALS — BODY MASS INDEX: 27.97 KG/M2 | HEIGHT: 62 IN | WEIGHT: 152 LBS

## 2021-06-01 VITALS — WEIGHT: 159.1 LBS | BODY MASS INDEX: 29.1 KG/M2

## 2021-06-01 VITALS — BODY MASS INDEX: 27.53 KG/M2 | WEIGHT: 150.5 LBS

## 2021-06-01 VITALS — WEIGHT: 155.4 LBS | BODY MASS INDEX: 28.6 KG/M2 | HEIGHT: 62 IN

## 2021-06-01 VITALS — BODY MASS INDEX: 28.79 KG/M2 | WEIGHT: 157.4 LBS

## 2021-06-01 VITALS — WEIGHT: 157.6 LBS | BODY MASS INDEX: 28.83 KG/M2

## 2021-06-01 VITALS — WEIGHT: 151.38 LBS | BODY MASS INDEX: 27.86 KG/M2 | HEIGHT: 62 IN

## 2021-06-01 VITALS — BODY MASS INDEX: 27.97 KG/M2 | WEIGHT: 152 LBS | HEIGHT: 62 IN

## 2021-06-01 VITALS — BODY MASS INDEX: 28.53 KG/M2 | WEIGHT: 156 LBS

## 2021-06-01 VITALS — BODY MASS INDEX: 28.34 KG/M2 | HEIGHT: 62 IN | WEIGHT: 154 LBS

## 2021-06-01 VITALS — BODY MASS INDEX: 29.12 KG/M2 | WEIGHT: 159.2 LBS

## 2021-06-01 VITALS — WEIGHT: 155.5 LBS | BODY MASS INDEX: 28.61 KG/M2 | HEIGHT: 62 IN

## 2021-06-01 VITALS — WEIGHT: 159.7 LBS | BODY MASS INDEX: 29.21 KG/M2
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
## 2021-06-01 NOTE — TELEPHONE ENCOUNTER
ANTICOAGULATION  MANAGEMENT    Assessment     Today's INR result of 3.0 is Therapeutic (goal INR of 2.0-3.0)        Warfarin taken as previously instructed    No new diet changes affecting INR    No new medication/supplements affecting INR    Continues to tolerate warfarin with no reported s/s of bleeding or thromboembolism     Previous INR was Subtherapeutic     May be switching to Eliquis per ov today, Dr Rees is awaiting hem/onc input. Sandy has already determined cost will not be prohibitive    Pre op today for IR IVC filter removal 10/16. Roxana instruction pending, determination of eliquis v warfarin    Plan:     Spoke with Sandy regarding INR result and instructed:     Warfarin Dosing Instructions:  Change warfarin dose to 7.5 mg daily on thu; and 5 mg daily rest of week  (3.6 % change)    Instructed patient to follow up no later than: one week 10/9  if still on warfarin      Sandy verbalizes understanding and agrees to warfarin dosing plan.    Instructed to call the [facility] for any changes, questions or concerns. (#[phone])   ?   Franchesca Holman RN    Subjective/Objective:      Sandy Spencer, a 79 y.o. female is on warfarin.     Sandy reports:     Home warfarin dose: as updated on anticoagulation calendar per template     Missed doses: No     Medication changes:  No     S/S of bleeding or thromboembolism:  No     New Injury or illness:  No     Changes in diet or alcohol consumption:  No     Upcoming surgery, procedure or cardioversion:  10/16    Anticoagulation Episode Summary     Current INR goal:   2.0-3.0   TTR:   46.0 %   Next INR check:   10/11/2019   INR from last check:   3.00 (10/2/2019)   Weekly max warfarin dose:      Target end date:      INR check location:      Preferred lab:      Send INR reminders to:   [facility]    Indications    Other chronic pulmonary embolism without acute cor pulmonale (H) [I27.82]           Comments:            Anticoagulation Care Providers      Provider Role Specialty Phone number    Caitlyn Rees MD Referring Family Medicine [phone]

## 2021-06-01 NOTE — PROGRESS NOTES
"Assessment/Plan:     Problem List Items Addressed This Visit     RSD upper limb, right    Lumbar radiculopathy - Primary    Relevant Medications    fentaNYL (DURAGESIC) 75 mcg/hr (Start on 9/28/2019)    Myofascial pain    Relevant Medications    methocarbamol (ROBAXIN) 500 MG tablet            No follow-ups on file.    Patient Instructions   PLAN:    Increase Fentanyl to 75 mgc every other day, discussed will have friend monitor, call if sedation    Discussed electromagnetic therapy, you will get call from Dr. Thomas's office.    If Dr. Thomas cannot see in timely manner, you may also contact [employer] [phone] to see if your insurance will cover    Dr. Lynn to send note to Dr. Rees regarding kidney doctor    Continue in physical therapy and osteopathic manipulation    Return in 8 weeks        Subjective:       79 y.o. female presents for evaluation multiple pain generators, including concern for chronic regional pain syndrome, lower extremity, right upper extremity, diffuse myofascial pain, migraine headache, more recently TMJ concerns.    She reviews today working with her cancer doctor.  There is concern with her kidney function deteriorating and was recommended to get a nephrologist.  Reviewing the record it was commented on her changes in kidney function, describes the patient requested seeing nephrologist as she is seen one in [place] and that was deferred to her primary care provider.    She is scheduled to have her filter removed mid-October, and have laboratories repeated again in early October.    She is very upset that her pain is not being managed well.  She is not sleeping well does poorly with sleep.    Describes the TMJ injection did not help.  She is told she has arthritis.  She cannot afford to see a dentist with poor insurance coverage.    She sees the physical therapist with a counselor recently.  She describes it takes almost the whole hour to \"unlock\" her body.  She also " has been having osteopathic manipulation once a month.    She is trying to contact the chiropractor that I recommended that does electromagnetic therapies though does not get a call back.    She reports that she is not eating well has some nausea.    The overflow diarrhea is being better controlled.    She is no longer taking morphine and is aware of concerns with kidney disease.  Has been using the fentanyl patch 50 mcg.  Describes the addition of a 12 mcg patch was prohibitively expensive.  She would like to go back to 75 mg and feels that she can manage that without the morphine in terms of pain control was on a higher dose.  She has friends were help monitor her.    We discussed other treatments for her chronic regional pain syndrome.  We had reviewed ketamine in the past.  She is alert and more recently that her daughter had adverse effects to ketamine, as did her grandson after an ankle fracture.      Current Outpatient Medications:      acetaminophen (TYLENOL) 500 MG tablet, Take 650 mg by mouth 3 (three) times a day.    , Disp: , Rfl:      azelastine (ASTEPRO) 0.15 % (205.5 mcg) Spry nasal spray, Apply 1 spray into each nostril 2 (two) times a day as needed., Disp: 30 mL, Rfl: 11     calcium carb/vitamin D3/vit K1 (VIACTIV ORAL), Take 1 tablet by mouth daily., Disp: , Rfl:      cholecalciferol, vitamin D3, 5,000 unit Tab, Take 1 tablet by mouth daily with supper.    , Disp: , Rfl:      cyanocobalamin, vitamin B-12, 5,000 mcg Subl, Take 1 tablet by mouth., Disp: , Rfl:      diclofenac sodium (VOLTAREN) 1 % Gel, Apply 4 g topically 4 (four) times a day. (apply to knees Q AM and Q 2pm and prn.  May self-administer), Disp: 100 g, Rfl: 5     diphenoxylate-atropine (LOMOTIL) 2.5-0.025 mg per tablet, Take 1 tablet by mouth 4 (four) times a day as needed for diarrhea., Disp: 30 tablet, Rfl: 1     [START ON 9/28/2019] fentaNYL (DURAGESIC) 75 mcg/hr, Place 1 patch on the skin every other day., Disp: 15 patch, Rfl:  0     ferrous sulfate 65 mg elemental iron, Take 1 tablet by mouth daily with breakfast., Disp: , Rfl:      Lactobacillus acidoph-L.bulgar (FLORANEX) 1 million cell Tab tablet, Take 1 tablet by mouth daily., Disp: 30 tablet, Rfl: 0     lamoTRIgine (LAMICTAL) 25 MG tablet, Take 1 tablet (25 mg total) by mouth daily., Disp: 90 tablet, Rfl: 2     levothyroxine (LEVO-T) 50 MCG tablet, Take 1 tablet (50 mcg total) by mouth Daily at 6:00 am., Disp: 30 tablet, Rfl: 3     MAGNESIUM ORAL, Take 400 mg by mouth daily.    , Disp: , Rfl:      methocarbamol (ROBAXIN) 500 MG tablet, Take 1 tablet (500 mg total) by mouth 4 (four) times a day., Disp: 90 tablet, Rfl: 0     naloxone (NARCAN) 4 mg/actuation nasal spray, 1 spray (4 mg dose) into one nostril for opioid reversal. Call 911. May repeat if no response in 3 minutes., Disp: 1 Box, Rfl: 0     promethazine (PHENERGAN) 12.5 MG tablet, Take 1 tablet (12.5 mg total) by mouth every 6 (six) hours as needed for nausea., Disp: 20 tablet, Rfl: 1     rosuvastatin (CRESTOR) 40 MG tablet, Take 1 tablet (40 mg total) by mouth daily., Disp: 30 tablet, Rfl: 3     senna-docusate (PERICOLACE) 8.6-50 mg tablet, Take 2 tablets by mouth daily as needed., Disp: 30 tablet, Rfl: 0     SUMAtriptan (IMITREX) 50 MG tablet, Take 1 tablet (50 mg total) by mouth every 2 (two) hours as needed for migraine., Disp: 27 tablet, Rfl: 1     topiramate (TOPAMAX) 50 MG tablet, Take 1.5 tablets (75 mg total) by mouth 2 (two) times a day., Disp: 270 tablet, Rfl: 0     traZODone (DESYREL) 100 MG tablet, TAKE 2 TO 4 TABLETS(200  MG) BY MOUTH AT BEDTIME AS NEEDED FOR SLEEP, Disp: 360 tablet, Rfl: 0     warfarin (COUMADIN/JANTOVEN) 5 MG tablet, Take one to two tablets (5 to 10 mg) by mouth daily. Adjust dose based on INR results as directed., Disp: 180 tablet, Rfl: 1    Current Facility-Administered Medications:      denosumab 60 mg (PROLIA 60 mg/ml), 60 mg, Subcutaneous, Q6 Months, Andrei Olivera MD, 60 mg  "at 04/05/19 1318  She is alert with a clear sensorium appropriately groomed.  Anxious affect.  Clear sensorium.         Objective:     Vitals:    09/19/19 1127   BP: 149/89   Pulse: 64   Resp: 16   Weight: 119 lb (54 kg)   Height: 5' 2\" (1.575 m)   PainSc:   6   PainLoc: Leg         Plan: She will continue to work in physical therapy and osteopathic manipulation.    We will increase the fentanyl patch back to 75 mcg, she will take this in the presence of her friends and they were monitor of concerns.    I did reach the chiropractor's office that does to magnetic therapy and takes her insurance they will contact the patient.  I gave her some other resources if they cannot see her in a timely manner but may not take her insurance.    Will inform her primary care provider of the concern regarding seeing kidney specialist.    Time spent more than 15 minutes face-to-face, 50% count about above condition coronation treatment plan          This note has been dictated using voice recognition software. Any grammatical or context distortions are unintentional and inherent to the software  "

## 2021-06-01 NOTE — PROGRESS NOTES
Injection - Other  Date/Time: 9/11/2019 1:25 PM  Performed by: Tyler Mittal MD  Authorized by: Tyler Mittal MD   Comments:     TEMPOROMANDIBULAR JOINT INJECTION, left  Performed on: 9/11/2019    Ms. Spencer is a 78-year-old woman with multiple pain complaints.  Today she is here because of the pain in her left TMJ area that causes left-sided headaches.  She is here to have a TMJ joint injection.    Pre Procedure Diagnosis:  TMJ Joint Pain  Vital Signs:  As per intra-procedure documentation    The procedure was discussed with Sandy Spencer in detail along with the attendant risks, including but not limited to: nerve injury, infection, bleeding, allergic reaction, worsening of pain.  Informed consent was obtained and patient elected to proceed.    After informed consent was obtained the patient was placed in a right lateral decubitus position on the examination table.  The area just in front of the left ear was prepped sterilely with alcohol.  A 1 inch number 30-gauge needle was used.  There was an injection made into the left TMJ joint.  A total of 0.5 mL of 0.25% Marcaine with 3 mg of Celestone was injected.    The patient tolerated the procedure well.  There were no complications.      Pre Procedure Pain Scale: 3  Pain Score: 0-No pain(post procedure)    If Sandy Spencer has any complications after discharge, she was instructed to call us.

## 2021-06-01 NOTE — TELEPHONE ENCOUNTER
ANTICOAGULATION  MANAGEMENT    Assessment     Today's INR result of 2.83 is Therapeutic (goal INR of 2.0-3.0)        Warfarin taken as previously instructed    No new diet changes affecting INR    Potential interaction between new calcium and warfarin which may affect subsequent INRs    Continues to tolerate warfarin with no reported s/s of bleeding or thromboembolism     Previous INR was Therapeutic    Plan:     Spoke with Sandy regarding INR result and instructed:     Warfarin Dosing Instructions:  Continue current warfarin dose    7.5 mg every Mon, Thu, Sat; 10 mg all other days     Instructed patient to follow up no later than: 2 weeks    Education provided: importance of therapeutic range and target INR goal and significance of current INR result    Sandy verbalizes understanding and agrees to warfarin dosing plan.    Instructed to call the [facility] for any changes, questions or concerns. (#[phone])   ?   Shey Tilley RN    Subjective/Objective:      Sandy Spencer, a 78 y.o. female is on warfarin.     Sandy reports:     Home warfarin dose: verbally confirmed home dose with Sandy and updated on anticoagulation calendar     Missed doses: No     Medication changes:  Yes: she started a new calcium supplement last week, she states it also contains Vit K     S/S of bleeding or thromboembolism:  No     New Injury or illness:  No     Changes in diet or alcohol consumption:  No     Upcoming surgery, procedure or cardioversion:  No    Anticoagulation Episode Summary     Current INR goal:   2.0-3.0   TTR:   42.8 %   Next INR check:   9/24/2019   INR from last check:   2.83 (9/10/2019)   Weekly max warfarin dose:      Target end date:      INR check location:      Preferred lab:      Send INR reminders to:   [facility]    Indications    Other chronic pulmonary embolism without acute cor pulmonale (H) [I27.82]           Comments:            Anticoagulation Care Providers     Provider Role  Specialty Phone number    Caitlyn Rees MD Referring Family Medicine [phone]
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
## 2021-06-01 NOTE — PATIENT INSTRUCTIONS - HE
PLAN:    Increase Fentanyl to 75 mgc every other day, discussed will have friend monitor, call if sedation    Discussed electromagnetic therapy, you will get call from Dr. Thomas's office.    If Dr. Thomas cannot see in timely manner, you may also contact [facility] [phone] to see if your insurance will cover    Dr. Lynn to send note to Dr. Rees regarding kidney doctor    Continue in physical therapy and osteopathic manipulation    Return in 8 weeks

## 2021-06-01 NOTE — PROGRESS NOTES
[facility] Daily Progress     Patient Name: Sandy Spencer  Date: 9/11/2019  Visit #: 1/12 +12       Referral Diagnosis: B knee pain HA;s Neck Pain  Referring provider: Michael Ramirez DO  Visit Diagnosis: R LE pain, R Knee Pain    ICD-10-CM    1. Chronic pain of right knee M25.561     G89.29    2. Complex regional pain syndrome type 1 of both lower extremities G90.523    3. Chronic pain syndrome G89.4    4. Localized edema R60.0    5. Chronic pain of both knees M25.561     M25.562     G89.29          Assessment:     Patient is benefitting from skilled physical therapy and is making steady progress toward functional goals.  Patient is appropriate to continue with skilled physical therapy intervention, as indicated by initial plan of care.      Treatment Results  HA and SI dysf  resolved,   Pain to the R LE decreased 0/10  Neuro, Arterial and muscular tender points reduced.   TMJ pain reduced 0-1/10  Having TMJ injection this PM    Goal Status:  No data recorded  No data recorded    Plan / Patient Education:     Plan to con't with manual therapy to decrease fascial tension/tone to normalize ROM/decrease inflammation/decrease mm tone and improve proprioception.      Subjective:     Pt has been recovering from an episode of Shingles affecting her her L low back sciatic nerve distribution.   Pain levels 5/10 to R knee RSD  Fatigue and neck pain 2-3/10   HA 4/10 RSD to R arm pain better  0/10 TMJ pain            Objective:     L SI upslip  HA frontal  Ant Neuro points          Treatment Today   9/11/2019   TREATMENT MINUTES COMMENTS   Evaluation     Self-care/ Home management          Manual therapy 60 MFR with OA, L5-SI and SI joint releases, Dural Tube Pull.   MFR to the head and neck   SCS to EYE-P,   TRIG1-N L,  SCS to R DLFC-LV,   LSG-A R,   R FIT-MS,   FETP-MS,   TITP-MS,   TETP-MS   FFIBP-MS,   FIBE-P,   TIBF-P,   LET12-P     Neuromuscular Re-education     Therapeutic Activity     herapeutic  Exercises     Gait training     Modality__________________                Total 60    Blank areas are intentional and mean the treatment did not include these items.       Yogi Velazquez  9/11/2019

## 2021-06-01 NOTE — TELEPHONE ENCOUNTER
ANTICOAGULATION  MANAGEMENT    Assessment     Today's INR result of 1.60 is Subtherapeutic (goal INR of 2.0-3.0)        Warfarin taken as previously instructed    Increased greens/vitamin K intake may be affecting INR    Interaction between Viactiv supplements started 9/10/19 (40 mcg of Vitamin K) and warfarin may be affecting INR    Continues to tolerate warfarin with no reported s/s of bleeding or thromboembolism     Previous INR was Therapeutic    Plan:     Spoke with Sandy regarding INR result and instructed:     Warfarin Dosing Instructions:  Change warfarin dose to 10 mg daily  (12 % change)    Instructed patient to follow up no later than: 1 week at appt with PCP    Education provided: importance of consistent vitamin K intake, importance of notifying clinic for changes in medications, monitoring for clotting signs and symptoms, when to seek medical attention/emergency care and importance of notifying clinic of upcoming surgeries and procedures 2 weeks in advance    Sandy verbalizes understanding and agrees to warfarin dosing plan.    Instructed to call the [facility] for any changes, questions or concerns. (#[phone])   ?   Caitlyn Posey RN    Subjective/Objective:      Sandy Spencer, a 79 y.o. female is on warfarin.     Sandy reports:     Home warfarin dose: as updated on anticoagulation calendar per template     Missed doses: No     Medication changes:  Yes: 9/10/19 Viactiv supplements (40 mcg vitamin K)     S/S of bleeding or thromboembolism:  No     New Injury or illness:  No     Changes in diet or alcohol consumption:  Yes: increased vitamin K in diet (Kale salad)     Upcoming surgery, procedure or cardioversion:  Yes: 10/16/19 IVC filter removal    Anticoagulation Episode Summary     Current INR goal:   2.0-3.0   TTR:   44.9 %   Next INR check:   10/2/2019   INR from last check:   1.60! (9/25/2019)   Weekly max warfarin dose:      Target end date:      INR check location:      Preferred lab:       Send INR reminders to:   [facility]    Indications    Other chronic pulmonary embolism without acute cor pulmonale (H) [I27.82]           Comments:            Anticoagulation Care Providers     Provider Role Specialty Phone number    Caitlyn Rees MD [facility] [phone]

## 2021-06-01 NOTE — PATIENT INSTRUCTIONS - HE
Hold all supplements, aspirin and NSAIDs for 7 days prior to surgery.  Follow your surgeon's direction on when to stop eating and drinking prior to surgery.  Your surgeon will be managing your pain after your surgery.    Remove all jewelry and metal piercings before your surgery.   Remove nail polish from fingers before surgery.  Ok to take Topamax, levothyroxine, Lamictal in the morning prior to surgery like normal with a small sip of water  Please do not take your vitamins the morning of your procedure.   Leave your fentanyl patch on.

## 2021-06-01 NOTE — PATIENT INSTRUCTIONS - HE
POST PROCEDURE INSTRUCTIONS:  PROCEDURE DONE TODAY: LEFT TEMPOROMANDIBULAR JOINT INJECTION    Rest today. Resume activity tomorrow as able.  Patient demonstrates the ability to ambulate independently with a steady gait at the time of discharge.      It is not unusual to have a temporary increase in your pain after a procedure. You can ice the area 24-48 hrs. 15 min at a time.      You received a steroid injection. This medication can take 2-7 days to take effect. Some temporary side effects include:    Heartburn    Flushed-red face    Insomnia-trouble sleeping-jitters    Diabetics may see a rise in blood sugar for a few days    Low back or SI joint (sacroiliac) injection: you may experience numbness in one or both legs. Please walk with help. This is temporary and will wear off in a few hours. Do not drive if you are tingling, numbness or weakness in your hands/arms or legs/feet.    Headache:  If you experience a headache after a lumbar epidural injection, lie down and drink fluids (especially caffeine). The headache will go away gradually. If it persists notify our clinic.    Fever: call if fever 100 or over, redness/warmth/swelling over the injection site.    No public hot tubs/pools for a couple days    Physical therapy: check with pain center provider regarding resuming therapy.    Monitor your response to the injection and report this at your next visit.    If you have been referred for a procedure only, please call your referring provider for a follow up.    IF YOU PLAN TO GET A FLU SHOT YOU MUST WAIT 2 WEEKS AFTER THIS INJECTION.      CALL IF ANY QUESTIONS [phone]
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
## 2021-06-01 NOTE — PROGRESS NOTES
[facility] Daily Progress     Patient Name: Sandy Spencer  Date: 9/25/2019  Visit #: 2/12 +12       Referral Diagnosis: B knee pain HA;s Neck Pain  Referring provider: Michael Ramirez DO  Visit Diagnosis: R LE pain, R Knee Pain    ICD-10-CM    1. Chronic pain of right knee M25.561     G89.29    2. Complex regional pain syndrome type 1 of both lower extremities G90.523    3. Chronic pain syndrome G89.4    4. Localized swelling of lower leg R22.40    5. Myofascial pain M79.18    6. 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 M48.07    7. Acute right-sided low back pain without sciatica M54.5          Assessment:     Patient is benefitting from skilled physical therapy and is making steady progress toward functional goals.  Patient is appropriate to continue with skilled physical therapy intervention, as indicated by initial plan of care.      Treatment Results  LBP and LE pain reducing 2/10  SI tension resolved   Neural tender points resolved    Goal Status:  No data recorded  No data recorded    Plan / Patient Education:     Plan to con't with manual therapy to decrease fascial tension/tone to normalize ROM/decrease inflammation/decrease mm tone and improve proprioception.      Subjective:     Shingles resolved.   Pain levels 6-7/10 to R knee RSD  Fatigue and neck pain 7/10   HA 4/10 RSD to R arm pain better  0/10 TMJ           Objective:     L SI upslip  HA frontal  Ant Neuro points          Treatment Today   9/25/2019   TREATMENT MINUTES COMMENTS   Evaluation     Self-care/ Home management          Manual therapy 35 SCS to COCX-N R,   SGL-N R,   Trochanteric Bursa R,   DOBT-N R,  DT11, 12, L1,2,3,4,5-N,      Neuromuscular Re-education 25 KTing of R Upper and lower Sciatc and brachial nerves with L5-S1 disc taping   KTing of Middle deltoid   KTing of L TMJ   Therapeutic Activity     herapeutic Exercises     Gait training     Modality__________________                Total 60    Blank areas are  intentional and mean the treatment did not include these items.       Yogi Velazquez  9/25/2019

## 2021-06-01 NOTE — PROGRESS NOTES
[facility] Daily Progress     Patient Name: Sandy Spencer  Date: 9/4/2019  Visit #: 12       Referral Diagnosis: B knee pain HA;s Neck Pain  Referring provider: Michael Ramirez DO  Visit Diagnosis: R LE pain, R Knee Pain    ICD-10-CM    1. 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 Chronic pain of right knee M25.561     G89.29    3. Lumbar radiculopathy M54.16    4. Complex regional pain syndrome type 1 of both lower extremities G90.523    5. Chronic pain syndrome G89.4    6. Chronic cluster headache, not intractable G44.029    7. Complex regional pain syndrome type 1 of right upper extremity G90.511          Assessment:     Patient is benefitting from skilled physical therapy and is making steady progress toward functional goals.  Patient is appropriate to continue with skilled physical therapy intervention, as indicated by initial plan of care.   's.     Goal Status:  Pt. will demonstrate/verbalize independence in self-management of condition in : 12 weeks  Pt. will be independent with home exercise program in : 12 weeks  Pt. will have improved quality of sleep: with less pain;waking less times/night;in 6 weeks  Pt. will show improved balance for safer : ambulation;standing;for dressing/grooming;for chores;for community ambulation;for exercise/recreation;independently;in 12 weeks  Pt. will be able to walk : 30 minutes;on uneven/inclined surfaces;1 mile;with less pain;with less difficulty;for community mobility;for exercise/recreation;in 12 weeks  Pt. will bend: to dress;to clean;with less pain;with less difficulty;for self care;for exercise/recreation;in 12 weeks  Patient will ascend / descend: stairs;step;curb;without railing;with recipricol gait;without assistive device;independently;with less pain;with less difficulty;in 12 weeks  Patient will sit: 60 minutes;for driving;for watching TV;for other activity;with less pain;with less difficultty;in 12 weeks  Patient will transfer:  sit/stand;supine/sit;floor/stand;for toileting;for in/out of bed;for in/out of chair;for other activity;with less pain;with less difficulty;in 12 weeks    No data recorded    Plan / Patient Education:     Plan to con't with manual therapy to decrease fascial tension/tone to normalize ROM/decrease inflammation/decrease mm tone and improve proprioception.      Subjective:     Pt has been recovering from an episode of Shingles affecting her her L low back sciatic nerve distribution.   Pain levels 5/10 to R knee RSD  Fatigue and neck pain RSD to R arm flared up  5/10  Along with TMJ pain            Objective:     R SI down slip   + SCAN lymphatics LE's 3 sec / cycle.  Neck 3 sec  Head 3 sec.       Treatment Today   9/4/2019   TREATMENT MINUTES COMMENTS   Evaluation     Self-care/ Home management          Manual therapy 60 MFR with OA, L5-SI and SI joint releases, Dural Tube Pull.   MFR to the head and neck   SCS to HYPT12,L3,4,5-N R,  PGL1-N,  NAOMI-V,   NAOMI-A,   NAOMI-LV,   Sigmoid Med, Lat,   INF   SGL-A L,      DLFC-LV bilat,  KTing of R UE brachial nerve to elbow    Releases achieved  Lymphatic circulation normalized.   Pt notices a decrease in body tension  Pain levels to the R UE LB and LE's decreasing   Neuromuscular Re-education     Therapeutic Activity     herapeutic Exercises     Gait training     Modality__________________                Total 60    Blank areas are intentional and mean the treatment did not include these items.       Yogi Velazquez  9/4/2019

## 2021-06-02 VITALS — BODY MASS INDEX: 26.13 KG/M2 | WEIGHT: 142 LBS | HEIGHT: 62 IN

## 2021-06-02 VITALS
BODY MASS INDEX: 24.63 KG/M2 | BODY MASS INDEX: 25.02 KG/M2 | HEIGHT: 63 IN | WEIGHT: 139 LBS | HEIGHT: 63 IN | HEIGHT: 63 IN | WEIGHT: 139 LBS | BODY MASS INDEX: 24.63 KG/M2 | WEIGHT: 139 LBS | BODY MASS INDEX: 24.63 KG/M2 | WEIGHT: 139 LBS

## 2021-06-02 VITALS — BODY MASS INDEX: 24.7 KG/M2 | WEIGHT: 139.4 LBS | HEIGHT: 63 IN

## 2021-06-02 VITALS — HEIGHT: 62 IN | BODY MASS INDEX: 27.6 KG/M2 | WEIGHT: 150 LBS

## 2021-06-02 VITALS — HEIGHT: 63 IN | BODY MASS INDEX: 24.69 KG/M2

## 2021-06-02 VITALS — HEIGHT: 62 IN | BODY MASS INDEX: 24.48 KG/M2 | WEIGHT: 133 LBS

## 2021-06-02 VITALS — HEIGHT: 62 IN | BODY MASS INDEX: 27.05 KG/M2 | WEIGHT: 147 LBS

## 2021-06-02 VITALS — HEIGHT: 62 IN | WEIGHT: 142.4 LBS | BODY MASS INDEX: 26.2 KG/M2

## 2021-06-02 VITALS — BODY MASS INDEX: 27.46 KG/M2 | WEIGHT: 149.2 LBS | HEIGHT: 62 IN

## 2021-06-02 VITALS — BODY MASS INDEX: 27.05 KG/M2 | HEIGHT: 62 IN | WEIGHT: 147 LBS

## 2021-06-02 VITALS — WEIGHT: 145.7 LBS | BODY MASS INDEX: 25.81 KG/M2

## 2021-06-02 VITALS — BODY MASS INDEX: 24.16 KG/M2 | WEIGHT: 136.4 LBS

## 2021-06-02 VITALS — WEIGHT: 132.1 LBS | BODY MASS INDEX: 24.31 KG/M2 | HEIGHT: 62 IN

## 2021-06-02 VITALS — WEIGHT: 124.9 LBS | BODY MASS INDEX: 22.66 KG/M2

## 2021-06-02 VITALS — BODY MASS INDEX: 27.1 KG/M2 | WEIGHT: 150.57 LBS

## 2021-06-02 VITALS — HEIGHT: 62 IN | BODY MASS INDEX: 24.66 KG/M2 | WEIGHT: 134 LBS

## 2021-06-02 VITALS — BODY MASS INDEX: 26.68 KG/M2 | WEIGHT: 145 LBS | HEIGHT: 62 IN

## 2021-06-02 VITALS — BODY MASS INDEX: 23.29 KG/M2 | WEIGHT: 131.5 LBS

## 2021-06-02 VITALS — BODY MASS INDEX: 26.05 KG/M2 | WEIGHT: 142.4 LBS

## 2021-06-02 VITALS — WEIGHT: 134.2 LBS | BODY MASS INDEX: 24.15 KG/M2

## 2021-06-02 VITALS — BODY MASS INDEX: 26.31 KG/M2 | WEIGHT: 143 LBS | HEIGHT: 62 IN

## 2021-06-02 VITALS — HEIGHT: 62 IN | WEIGHT: 134 LBS | BODY MASS INDEX: 24.66 KG/M2

## 2021-06-02 VITALS — WEIGHT: 145.5 LBS | BODY MASS INDEX: 26.19 KG/M2

## 2021-06-02 VITALS — WEIGHT: 134 LBS | BODY MASS INDEX: 24.66 KG/M2 | HEIGHT: 62 IN

## 2021-06-02 VITALS — HEIGHT: 62 IN | BODY MASS INDEX: 26.31 KG/M2 | WEIGHT: 143 LBS

## 2021-06-02 VITALS — WEIGHT: 147.8 LBS | BODY MASS INDEX: 27.03 KG/M2

## 2021-06-02 VITALS — BODY MASS INDEX: 26.31 KG/M2 | HEIGHT: 62 IN | WEIGHT: 143 LBS

## 2021-06-02 VITALS — BODY MASS INDEX: 24.25 KG/M2 | WEIGHT: 132.6 LBS

## 2021-06-02 VITALS — HEIGHT: 63 IN | BODY MASS INDEX: 25.43 KG/M2 | BODY MASS INDEX: 25.03 KG/M2 | WEIGHT: 141.31 LBS

## 2021-06-02 VITALS — BODY MASS INDEX: 21.95 KG/M2 | WEIGHT: 123.9 LBS

## 2021-06-02 VITALS — WEIGHT: 149.2 LBS | BODY MASS INDEX: 27.29 KG/M2

## 2021-06-02 VITALS — WEIGHT: 135.7 LBS | BODY MASS INDEX: 24.82 KG/M2

## 2021-06-02 VITALS — HEIGHT: 63 IN | BODY MASS INDEX: 24.06 KG/M2

## 2021-06-02 VITALS — BODY MASS INDEX: 26.28 KG/M2 | WEIGHT: 143.7 LBS

## 2021-06-02 VITALS — HEIGHT: 62 IN | BODY MASS INDEX: 26.94 KG/M2 | WEIGHT: 146.38 LBS

## 2021-06-02 VITALS — WEIGHT: 143 LBS | BODY MASS INDEX: 26.31 KG/M2 | HEIGHT: 62 IN

## 2021-06-02 VITALS — BODY MASS INDEX: 24.42 KG/M2 | WEIGHT: 133.5 LBS

## 2021-06-02 VITALS — WEIGHT: 142.3 LBS | BODY MASS INDEX: 26.03 KG/M2

## 2021-06-02 VITALS — BODY MASS INDEX: 24.06 KG/M2 | WEIGHT: 135.8 LBS
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
## 2021-06-02 NOTE — TELEPHONE ENCOUNTER
----- Message from Mylene Helton MD sent at 10/7/2019  5:32 PM CDT -----  Stable hemoglobin. INR is essentially normal. Recommend resume warfarin at 10 mg today unless you hear otherwise from anticoagulation. Let us know if you have any questions.

## 2021-06-02 NOTE — TELEPHONE ENCOUNTER
Patient has been without fentanyl for 2 days as pharmacy did not have in stock and they did not notify us to find other source. Having symptoms of withdrawal.

## 2021-06-02 NOTE — TELEPHONE ENCOUNTER
INR today is SUB therapeutic at 1.87.    Patient had a successful IVC filter removal.    Patient was given Warfarin dosing instructions yesterday for post-procedure.    No need to call the patient regarding today's INR result.     Patient is scheduled to re-check her INR this FRI 10/18/19.    Caitlyn Posey RN, [facility] Anticoagulation Management  [phone]

## 2021-06-02 NOTE — ANESTHESIA CARE TRANSFER NOTE
Last vitals:   Vitals:    10/16/19 1106   BP: 155/70   Pulse: 86   Resp: 15   Temp: 37.2  C (98.9  F)   SpO2: 99%     Patient's level of consciousness is drowsy  Spontaneous respirations: yes  Maintains airway independently: yes  Dentition unchanged: yes  Oropharynx: oropharynx clear of all foreign objects    QCDR Measures:  ASA# 20 - Surgical Safety Checklist: WHO surgical safety checklist completed prior to induction    PQRS# 430 - Adult PONV Prevention: 4558F - Pt received => 2 anti-emetic agents (different classes) preop & intraop  ASA# 8 - Peds PONV Prevention: NA - Not pediatric patient, not GA or 2 or more risk factors NOT present  PQRS# 424 - Aracelis-op Temp Management: 4559F - At least one body temp DOCUMENTED => 35.5C or 95.9F within required timeframe  PQRS# 426 - PACU Transfer Protocol: - Transfer of care checklist used  ASA# 14 - Acute Post-op Pain: ASA14B - Patient did NOT experience pain >= 7 out of 10

## 2021-06-02 NOTE — TELEPHONE ENCOUNTER
RN cannot approve Refill Request    RN can NOT refill this medication med is not covered by policy/route to provider. Last office visit: 10/30/2019 Caitlyn Rees MD Last Physical: 10/2/2019 Last MTM visit: Visit date not found Last visit same specialty: 10/30/2019 Caitlyn Rees MD.  Next visit within 3 mo: Visit date not found  Next physical within 3 mo: Visit date not found      Ragini Rowe, [facility] Triage/Med Refill 10/30/2019    Requested Prescriptions   Pending Prescriptions Disp Refills     colestipol (COLESTID) 1 gram tablet [Pharmacy Med Name: COLESTIPOL 1GM TABLETS] 360 tablet 1     Sig: TAKE 2 TABLETS(2 GRAMS) BY MOUTH DAILY FOR 14 DAYS THEN TAKE 2 TABLETS(2 GRAMS) BY MOUTH TWICE DAILY       There is no refill protocol information for this order

## 2021-06-02 NOTE — PROGRESS NOTES
[facility] Daily Progress     Patient Name: Sandy Spencer  Date: 10/21/2019  Visit #: 4/12 +12       Referral Diagnosis: B knee pain HA;s Neck Pain  Referring provider: Michael Raimrez DO  Visit Diagnosis: R LE pain, R Knee Pain    ICD-10-CM    1. Arthralgia of both lower legs M25.561     M25.562    2. Complex regional pain syndrome type 1 of both lower extremities G90.523    3. Chronic pain syndrome G89.4    4. Localized swelling of lower leg R22.40    5. Myofascial pain M79.18    6. 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 M48.07    7. Acute right-sided low back pain without sciatica M54.5    8. Complex regional pain syndrome type 1 of right upper extremity G90.511    9. Temporomandibular joint-pain-dysfunction syndrome (TMJ) M26.629    10. Localized edema R60.0    11. Reflex sympathetic dystrophy G90.50          Assessment:     Patient is benefitting from skilled physical therapy and is making steady progress toward functional goals.  Patient is appropriate to continue with skilled physical therapy intervention, as indicated by initial plan of care.      Treatment Results   HA reduced 3/10   LBP decreased 2/10     Able to move with greater ease  Goal Status:  No data recorded  No data recorded    Plan / Patient Education:     Plan to con't with manual therapy to decrease fascial tension/tone to normalize ROM/decrease inflammation/decrease mm tone and improve proprioception.      Subjective:     Pain levels 5/10 to Legs and R arm    LBP is the worse 8/10   5/10 to the R elbow   Have a chiro appt for Acupuncture      Objective:   + Scan  Venous   Arterial              Treatment Today   10/21/2019   TREATMENT MINUTES COMMENTS   Evaluation     Self-care/ Home management          Manual therapy 60 SCS to EYE3,4,6-MS.   Cranial releases frontal,  Nasal  Temporal bones,   SCS to PINTS L R9,10,11,12   Rectal & Uterine artery  MFR to Neck, Head   Neuromuscular Re-education     Therapeutic Activity      herapeutic Exercises     Gait training     Modality__________________                Total 60    Blank areas are intentional and mean the treatment did not include these items.       Yogi Velazquez  10/21/2019

## 2021-06-02 NOTE — PROGRESS NOTES
Assessment:         1. Medication intolerance     2. Ecchymosis  HM2(CBC w/o Differential)    INR   3. Other chronic pulmonary embolism without acute cor pulmonale (H)  INR   4. Chronic kidney disease (CKD) stage G3a/A1, moderately decreased glomerular filtration rate (GFR) between 45-59 mL/min/1.73 square meter and albuminuria creatinine ratio less than 30 mg/g (H)     5. Hypotension, unspecified hypotension type              Plan:          We reviewed the potential etiologies for her current dizziness and I suspect it is a side effect of the eliquis rather than a sign of bleeding complication. I agree that we should d/c the eliquis, and she would like to resume the warfarin. We will check an INR and CBC today to see were we should start with the warfarin dosing, but I am leaning toward having her take 10 mg which has been her dose for some time. She will resume monitoring with [facility]. It is her understanding, and the understanding of Dr Rees, that she should be anticoagulated for the filter removal due to her risk of CV event. She will try to verify that with the IR team. Blood pressure is under adequate control. She will continue her same medications at this time, and plan to follow up post operatively for a med check, sooner if any difficulties.         Subjective:        Fasting today? No      Sandy ZIMMERMAN Tj is a 79 y.o. female here for follow-up of anticoagulation management. She has a history of recurrent PE and is s/p IVC filter placement, but the brand of filter was recalled and she is concerned about it's safety. She is scheduled to have the filter out this month and she was changed over to Eliquis at her preop last week since she has had some difficulty regulating her INR due to her short bowel and intermittent nausea. She had been feeling poorly, but has been very dizzy/light-headed since starting the Eliquis and has been having difficulty with bumping into things and even driving due to those  symptoms. She has some bruises on her legs from the imbalance but denies any head trauma, headache or significant flank pain, etc. No epistaxis or rectal bleeding. She would like to resume the warfarin at this time given her current symptoms. Her last warfarin dose was 10/2/19, and she was last taking 10 mg 6 days and 7.5 mg once weekly       Associated signs and symptoms: tiredness/fatigue. Denies chest pain, dyspnea and palpitations. The patient exercises rarely due to chronic pain in her back and RSD in legs and right arm. Weight trend: fluctuating a bit.        The following portions of the patient's history were reviewed and updated as appropriate: allergies, current medications, past family history, past medical history, past social history, past surgical history and problem list.    Review of Systems  A 12 point comprehensive review of systems was negative except as noted.          Objective:        Vitals:    10/07/19 1300   BP: 138/70   Patient Position: Sitting   Cuff Size: Adult Regular   Pulse: 84   SpO2: 98%   Weight: 121 lb (54.9 kg)          General:    Alert, cooperative, no distress   Head:    Normocephalic, without obvious abnormality, atraumatic   Eyes:    PERRL, conjunctiva/corneas clear, EOM's intact    Ears:    Normal TM's and external ear canals, both ears   Nose:   Nares normal, mucosa normal, no drainage or sinus tenderness   Throat:   Lips, mucosa, and tongue normal; teeth and gums normal   Neck:   Supple, symmetrical,  no adenopathy;  thyroid:  normal   Back:     Symmetric, ROM normal, no CVA tenderness   Lungs:     Clear to auscultation bilaterally, respirations unlabored   CV:    Regular rate and rhythm   Abdomen:     Soft, non-tender, no masses, no organomegaly   Extremities:   Extremities normal, atraumatic, no cyanosis or edema   Pulses:   2+ and symmetric all extremities   Skin:   Skin color, texture, turgor normal, no rashes or lesions   Neurologic:   normal strength and tone  throughout       Results for orders placed or performed in visit on 10/07/19   HM2(CBC w/o Differential)   Result Value Ref Range    WBC 3.9 (L) 4.0 - 11.0 thou/uL    RBC 3.46 (L) 3.80 - 5.40 mill/uL    Hemoglobin 11.7 (L) 12.0 - 16.0 g/dL    Hematocrit 33.8 (L) 35.0 - 47.0 %    MCV 98 80 - 100 fL    MCH 33.8 27.0 - 34.0 pg    MCHC 34.6 32.0 - 36.0 g/dL    RDW 13.2 11.0 - 14.5 %    Platelets 168 140 - 440 thou/uL    MPV 7.2 7.0 - 10.0 fL   INR   Result Value Ref Range    INR 1.08 0.90 - 1.10     *Note: Due to a large number of results and/or encounters for the requested time period, some results have not been displayed. A complete set of results can be found in Results Review.        Results for orders placed or performed in visit on 10/02/19   Culture, Urine   Result Value Ref Range    Culture No Growth    Comprehensive Metabolic Panel   Result Value Ref Range    Sodium 138 136 - 145 mmol/L    Potassium 3.7 3.5 - 5.0 mmol/L    Chloride 108 (H) 98 - 107 mmol/L    CO2 21 (L) 22 - 31 mmol/L    Anion Gap, Calculation 9 5 - 18 mmol/L    Glucose 88 70 - 125 mg/dL    BUN 24 8 - 28 mg/dL    Creatinine 1.41 (H) 0.60 - 1.10 mg/dL    GFR MDRD Af Amer 44 (L) >60 mL/min/1.73m2    GFR MDRD Non Af Amer 36 (L) >60 mL/min/1.73m2    Bilirubin, Total 0.4 0.0 - 1.0 mg/dL    Calcium 8.7 8.5 - 10.5 mg/dL    Protein, Total 6.5 6.0 - 8.0 g/dL    Albumin 4.0 3.5 - 5.0 g/dL    Alkaline Phosphatase 41 (L) 45 - 120 U/L    AST 19 0 - 40 U/L    ALT 15 0 - 45 U/L   HM2(CBC w/o Differential)   Result Value Ref Range    WBC 3.9 (L) 4.0 - 11.0 thou/uL    RBC 3.45 (L) 3.80 - 5.40 mill/uL    Hemoglobin 11.7 (L) 12.0 - 16.0 g/dL    Hematocrit 33.7 (L) 35.0 - 47.0 %    MCV 98 80 - 100 fL    MCH 34.0 27.0 - 34.0 pg    MCHC 34.8 32.0 - 36.0 g/dL    RDW 13.3 11.0 - 14.5 %    Platelets 168 140 - 440 thou/uL    MPV 7.2 7.0 - 10.0 fL   C-Reactive Protein   Result Value Ref Range    CRP 0.2 0.0 - 0.8 mg/dL   Urinalysis Macroscopic   Result Value Ref Range     Color, UA Yellow Colorless, Yellow, Straw, Light Yellow    Clarity, UA Slightly Cloudy (!) Clear    Glucose, UA Negative Negative    Bilirubin, UA Negative Negative    Ketones, UA Negative Negative    Specific Gravity, UA 1.015 1.005 - 1.030    Blood, UA Trace (!) Negative    pH, UA 7.5 5.0 - 8.0    Protein, UA Trace (!) Negative mg/dL    Urobilinogen, UA 0.2 E.U./dL 0.2 E.U./dL, 1.0 E.U./dL    Nitrite, UA Negative Negative    Leukocytes, UA Negative Negative   INR   Result Value Ref Range    INR 3.00 (H) 0.90 - 1.10     *Note: Due to a large number of results and/or encounters for the requested time period, some results have not been displayed. A complete set of results can be found in Results Review.

## 2021-06-02 NOTE — TELEPHONE ENCOUNTER
ANTICOAGULATION  MANAGEMENT    Assessment     Today's INR result of 3.3 is Supratherapeutic (goal INR of 2.0-3.0)        Warfarin taken as previously instructed    Pt said she's been off Ensure for 2-3 weeks now. Does not plan to get back on it.     No new medication/supplements affecting INR    Continues to tolerate warfarin with no reported s/s of bleeding or thromboembolism     Previous INR was Supratherapeutic    Plan:     Spoke with Sandy regarding INR result and instructed:     Warfarin Dosing Instructions:  Change warfarin dose to 10 mg daily on Mon, Wed, Sat; and 7.5 mg daily rest of week  (7.7 % change)    Instructed patient to follow up no later than: OV on 10/30.     Education provided: importance of taking warfarin as instructed    Sandy verbalizes understanding and agrees to warfarin dosing plan.    Instructed to call the [facility] for any changes, questions or concerns. (#[phone])   ?   Lefty Clifton RN    Subjective/Objective:      Sandy ELSIE Spencer, a 79 y.o. female is on warfarin.     Sandy reports:     Home warfarin dose: template incorrect; verbally confirmed home dose with pt and updated on anticoagulation calendar     Missed doses: No     Medication changes:  No     S/S of bleeding or thromboembolism:  No     New Injury or illness:  No     Changes in diet or alcohol consumption:  No     Upcoming surgery, procedure or cardioversion:  No    Anticoagulation Episode Summary     Current INR goal:   2.0-3.0   TTR:   43.1 %   Next INR check:   10/30/2019   INR from last check:   3.30! (10/24/2019)   Weekly max warfarin dose:      Target end date:      INR check location:      Preferred lab:      Send INR reminders to:   [facility]    Indications    Other chronic pulmonary embolism without acute cor pulmonale (H) [I27.82]           Comments:            Anticoagulation Care Providers     Provider Role Specialty Phone number    Caitlyn Rees MD Referring Family  Medicine [phone]

## 2021-06-02 NOTE — PROGRESS NOTES
"Prolia Injection Phone Screen      Screening questions have been asked 2-3 days prior to administration visit for Prolia. If any questions are answered with \"Yes,\" this phone encounter were will routed to ordering provider for further evaluation.     1.  When was the last injection?  4/5/19    2.  Has insurance for this injection been verified?  Yes    3.  Did you experience any new onset achiness or rashes that lasted for over a month with your previous Prolia injection?   No    4.  Do you have a fever over 101?F or a new deep cough that is unusual for you today? No    5.  Have you started any new medications in the last 6 months that you were told could affect your immune system? These may have been prescribed by oncologist, transplant, rheumatology, or dermatology.   No    6.  In the last 6 months have you have gastric bypass or parathyroid surgery?   No    7.  Do you plan dental work requiring drilling into the bone such as implants/extractions or oral surgery in the next 2-3 months?   No    8. Do you have new insurance since the last injection?    Patient informed if symptoms discussed above present prior to their administration appointment, they are to notify clinic immediately.     Kika Martínez            The following steps were completed to comply with the REMS program for Prolia:  1. Ordering provider has previously reviewed information in the Medication Guide and Patient Counseling Chart, including the serious risks of Prolia  and the symptoms of each risk and have been advised to seek prompt medical attention if they have signs or symptoms of any of the serious risks.  2. Provided each patient a copy of the Medication Guide and Patient Brochure.  See MAR for administration details.   Indication: Prolia  (denosumab) is a prescription medicine used to treat osteoporosis in patients who:   Are at high risk for fracture, meaning patients who have had a fracture related to osteoporosis, or who have " multiple risk factors for fracture; Cannot use another osteoporosis medicine or other osteoporosis medicines did not work well.   The timeline for early/late injections would be 4 weeks early and any time after the 6 month boubacar. If a patient receives their injection late, then the subsequent injection would be 6 months from the date that they actually received the injection    Have the screening questions been asked prior to this administration? Yes      After obtaining consent, and per orders of Caroline Sumner NP, injection of Prolia 60 mg given by Kika Martínez. Patient instructed to remain in clinic for 20 minutes afterwards, and to report any adverse reaction to me immediately.

## 2021-06-02 NOTE — ANESTHESIA PREPROCEDURE EVALUATION
Anesthesia Evaluation      Patient summary reviewed   History of anesthetic complications (PONV)     Airway   Mallampati: II  Neck ROM: full   Pulmonary     breath sounds clear to auscultation  (+) a smoker (former)  (-) rhonchi, wheezes, rales, stridor    ROS comment: PE                         Cardiovascular   Exercise tolerance: > or = 4 METS  (+) hypertension, CAD, , hypercholesterolemia, PVD    (-) murmur  ECG reviewed (2/9/19: sinus robert, 59 bpm)  Rhythm: regular  Rate: normal,    no murmur   ROS comment: Echo 1/26/19:  1. Normal left ventricular size and systolic performance with a visually estimated ejection fraction of 65-70%.   2. No significant valvular heart disease is identified on this study.   3. Normal right ventricular size and systolic performance.   4. The pulmonary artery pressure estimate is within the normal range.     When compared to the prior real-time echocardiogram dated 10 December 2016, there has been little appreciable interval change.     IVC Filter for past PE- no longer needed, s/f removal in IR today      Neuro/Psych    (+) neuromuscular disease (cervical spondylosis),  CVA (TIA) , depression, anxiety/panic attacks, chronic pain    Comments: CRPS type 1 of both lower extremities  RSD  Lumbar radiculopathy  Chronic pain  Low back pain  Myofascial pain  Intercostal neuralgia    Endo/Other    (+) hypothyroidism,      Comments: Multiple antibodies, difficult PRBC cross match     GI/Hepatic/Renal    (+)   chronic renal disease (Stage 3 CKD),      Other findings:   NPO > 8 hrs   Anxious      Dental - normal exam     Comment: No loose, chipped, partial, removable or dentures.                           Anesthesia Plan  Planned anesthetic: general LMA  - LMA 4  - Avoid Ketamine - intense hallucinations w/ prior anesthetic   - Decadron 10, Zofran 4 for antiemesis.   Induction: intravenous   Anesthetic plan and risks discussed with: patient  Anesthesia plan special considerations:  antiemetics,   Post-op plan: routine recovery

## 2021-06-02 NOTE — PATIENT INSTRUCTIONS - HE
If there's anything wildly abnormal on your blood work, someone will call you after hours.    The INR nurses will be reaching out to you.      Thank you for coming in today!    If you receive a survey from [facility] about your experience today, it would be very helpful if you could fill it out to let us know what went well and what we can improve!    General Information:    Today you had your appointment with Sloan Hussein NP    My hours are:    Monday : Out of clinic  Tuesday : 8:00AM - 5:00 PM  Wednesday: 8:00AM - 5:00 PM  Thursday: 8:00AM - 5:00 PM  Friday: 8:00AM - 5:00 PM    I am not in the office Mondays. Therefore non-urgent calls and medical messages received on Monday will be addressed when I am back in the office on Tuesday. Urgent matters will be reviewed and addressed by one of my partners in the office as needed.    If lab work was done today as part of your evaluation you will generally be contacted via [employer], mail, or phone with the results within 1-5 days. If there is an alarming result we will contact you by phone. Lab results come back at varying times, I generally wait until all lab results are available before making comments on the results.     If you need refills please contact your pharmacy. They will send a refill request to me to review. Please allow 3-5 business days for us to process all refill requests.     My Clinical Assistant is Jessa. Please call us at [phone] or send a medical message with any questions or concerns.

## 2021-06-02 NOTE — ANESTHESIA POSTPROCEDURE EVALUATION
Patient: Sandy Spencer  * No procedures listed *  Anesthesia type: general    Patient location: PACU  Last vitals: No vitals data found for the desired time range.    Post vital signs: stable  Level of consciousness: awake and responds to simple questions  Post-anesthesia pain: pain controlled  Post-anesthesia nausea and vomiting: no  Pulmonary: unassisted, return to baseline  Cardiovascular: stable and blood pressure at baseline  Hydration: adequate  Anesthetic events: no    QCDR Measures:  ASA# 11 - Aracelis-op Cardiac Arrest: ASA11B - Patient did NOT experience unanticipated cardiac arrest  ASA# 12 - Aracelis-op Mortality Rate: ASA12B - Patient did NOT die  ASA# 13 - PACU Re-Intubation Rate: ASA13B - Patient did NOT require a new airway mgmt  ASA# 10 - Composite Anes Safety: ASA10A - No serious adverse event    Additional Notes:

## 2021-06-02 NOTE — TELEPHONE ENCOUNTER
Patient spoke with Dr. Floyd's office this morning. They spoke with IR and the INR result from yesterday of 1.30 is fine for the procedure scheduled for 10/16/19. Sandy accepted these answers and agreed to plan (see telephone encounter dated 10/14/19).    SIMRAN called and spoke with Sandy to verify that she was going to have the IVC filter removal on 10/16/19.    Caitlyn Posey RN, ACN  [facility] Anticoagulation Management  [phone]

## 2021-06-02 NOTE — PROGRESS NOTES
[facility] Daily Progress     Patient Name: Sandy Spencer  Date: 10/10/2019  Visit #: 3/12 +12       Referral Diagnosis: B knee pain HA;s Neck Pain  Referring provider: Michael Ramirez DO  Visit Diagnosis: R LE pain, R Knee Pain    ICD-10-CM    1. Arthralgia of both lower legs M25.561     M25.562    2. Chronic pain of right knee M25.561     G89.29    3. Complex regional pain syndrome type 1 of both lower extremities G90.523    4. Chronic pain syndrome G89.4          Assessment:     Patient is benefitting from skilled physical therapy and is making steady progress toward functional goals.  Patient is appropriate to continue with skilled physical therapy intervention, as indicated by initial plan of care.      Treatment Results   Treatment of the cartlefge of the knees and the ruciate ligs reduced the LE pain to 3/10    Goal Status:  No data recorded  No data recorded    Plan / Patient Education:     Plan to con't with manual therapy to decrease fascial tension/tone to normalize ROM/decrease inflammation/decrease mm tone and improve proprioception.      Subjective:     Pain levels 5/10 to Legs and R arm   Had cortisone injections  10/3/19 [facility]  Neck pain reduced and no HA's since   Elbow pain and R LE are the most bothersome      Objective:   + Scan  Visceral Mid Abdomen,   Ant Neuro Pelvis,   Arterial Post Cervical,   Periosteal Pelvis,   Ant Long Lig,   Lig Flam R LumbarSCS to Adr            Treatment Today   10/10/2019   TREATMENT MINUTES COMMENTS   Evaluation     Self-care/ Home management          Manual therapy 50 SCS to ADREN -A,   NAOMI-A,   NAOMI-LV,   ASUR-A R,  MIFG-A R,  SCS to Ant Cruciate Lig bilat   MMA C -MS bilat   LMA C -MS bilat   Neuromuscular Re-education 10 KTing of Knee bruises,  R UE Brachial nerve   Therapeutic Activity     herapeutic Exercises     Gait training     Modality__________________                Total 60    Blank areas are intentional and mean the treatment did  not include these items.       Yogi Velazquez  10/10/2019

## 2021-06-02 NOTE — TELEPHONE ENCOUNTER
"RN Triage:     Patient is calling in stating she is having surgery to remove a IVC filter next week. Patient stated she is not wanting surgery as she is not feeling well. Patient has 9\" of large intestines and has constant diarrhea. She stated she has always had diarrhea and its been \"managable\" She had this problem for 40 years. She stated now the diarrhea is constant, she is not feeling well stating that her \"electrolytes feel off\". She had recent albs last week. Patient stated she is nauseated and has a lot of gas.  She stated the stool is greenish black from the iron. Patient is taking warfarin. Patient having 5-6 episodes of diarrhea. Making clear yellow urine. No recent antibiotic use and reported recent antiviral medication for shingles. Patient advised to be seen in the clinic and warm transferred to scheduling. Patient coming in today.   Miri Kennedy RN, BSN [facility] Triage Nurse    Reason for Disposition    MODERATE diarrhea (e.g., 4-6 times / day more than normal) and age > 70 years    Protocols used: DIARRHEA-A-OH      "

## 2021-06-02 NOTE — TELEPHONE ENCOUNTER
ANTICOAGULATION  MANAGEMENT    Assessment     Today's INR result of 3.20 is Subtherapeutic (goal INR of 2.0-3.0)        Less warfarin taken than instructed which may be affecting INR    No new diet changes affecting INR    No new medication/supplements affecting INR    Continues to tolerate warfarin with no reported s/s of bleeding or thromboembolism     Previous INR was Subtherapeutic    Plan:     Spoke with Sandy regarding INR result and instructed:     Warfarin Dosing Instructions:  Change warfarin dose to 7.5 mg daily on MON / FRI; and 10 mg daily rest of week  (10 % change)    Instructed patient to follow up no later than: 10/23/19    Education provided: target INR goal and significance of current INR result    Sandy verbalizes understanding and agrees to warfarin dosing plan.    Instructed to call the [facility] for any changes, questions or concerns. (#[phone])   ?   Caitlyn Posey RN    Subjective/Objective:      Sandy ELSIE Spencer, a 79 y.o. female is on warfarin.     Sandy reports:     Home warfarin dose: as updated on anticoagulation calendar per template     Missed doses: No     Medication changes:  No     S/S of bleeding or thromboembolism:  No     New Injury or illness:  No     Changes in diet or alcohol consumption:  No     Upcoming surgery, procedure or cardioversion:  No    Anticoagulation Episode Summary     Current INR goal:   2.0-3.0   TTR:   44.4 %   Next INR check:   10/23/2019   INR from last check:   3.20! (10/18/2019)   Weekly max warfarin dose:      Target end date:      INR check location:      Preferred lab:      Send INR reminders to:   [facility]    Indications    Other chronic pulmonary embolism without acute cor pulmonale (H) [I27.82]           Comments:            Anticoagulation Care Providers     Provider Role Specialty Phone number    Caitlyn Rees MD Referring Family Medicine [phone]

## 2021-06-02 NOTE — TELEPHONE ENCOUNTER
Refill Approved    Rx renewed per Medication Renewal Policy. Medication was last renewed on 3/3/19.    Magali Monson, [facility] Triage/Med Refill 10/19/2019     Requested Prescriptions   Pending Prescriptions Disp Refills     rosuvastatin (CRESTOR) 40 MG tablet [Pharmacy Med Name: ROSUVASTATIN 40MG TABLETS] 90 tablet 0     Sig: TAKE 1 TABLET(40 MG) BY MOUTH DAILY       Statins Refill Protocol (Hmg CoA Reductase Inhibitors) Passed - 10/18/2019  6:03 PM        Passed - PCP or prescribing provider visit in past 12 months      Last office visit with prescriber/PCP: 9/3/2019 Caitlyn Rees MD OR same dept: 10/11/2019 Sloan Hussein CNP OR same specialty: 10/11/2019 Sloan Hussein CNP  Last physical: 10/2/2019 Last MTM visit: Visit date not found   Next visit within 3 mo: Visit date not found  Next physical within 3 mo: Visit date not found  Prescriber OR PCP: Caitlyn Rees MD  Last diagnosis associated with med order: 1. Mixed hyperlipidemia  - rosuvastatin (CRESTOR) 40 MG tablet [Pharmacy Med Name: ROSUVASTATIN 40MG TABLETS]; TAKE 1 TABLET(40 MG) BY MOUTH DAILY  Dispense: 90 tablet; Refill: 0    If protocol passes may refill for 12 months if within 3 months of last provider visit (or a total of 15 months).

## 2021-06-02 NOTE — TELEPHONE ENCOUNTER
ANTICOAGULATION  MANAGEMENT    Assessment     Today's INR result of 1.20 is Subtherapeutic (goal INR of 2.0-3.0)        Warfarin recently held as instructed which may be affecting INR    Episodes of diarrhea may be affecting diet and INR    No new medication/supplements affecting INR    Continues to tolerate warfarin with no reported s/s of bleeding or thromboembolism     Previous INR was Subtherapeutic    Plan:     Spoke with Sandy regarding INR result and instructed:     Warfarin Dosing Instructions:  Change warfarin dose to 7.5 mg daily on SUN; and 10 mg daily rest of week  (0 % change)    Instructed patient to follow up no later than: 10/14/19    Education provided: importance of therapeutic range and target INR goal and significance of current INR result    Sandy verbalizes understanding and agrees to warfarin dosing plan.    Instructed to call the [facility] for any changes, questions or concerns. (#[phone])   ?   Caitlyn Posey RN    Subjective/Objective:      Sandy Spencer, a 79 y.o. female is on warfarin.     Sandy reports:     Home warfarin dose: as updated on anticoagulation calendar per template     Missed doses: No     Medication changes:  No     S/S of bleeding or thromboembolism:  No     New Injury or illness:  Yes: diarrhea     Changes in diet or alcohol consumption:  No     Upcoming surgery, procedure or cardioversion:  Yes: IVC filter removal 10/16/19    Anticoagulation Episode Summary     Current INR goal:   2.0-3.0   TTR:   45.2 %   Next INR check:   10/14/2019   INR from last check:   1.20! (10/11/2019)   Weekly max warfarin dose:      Target end date:      INR check location:      Preferred lab:      Send INR reminders to:   [facility]    Indications    Other chronic pulmonary embolism without acute cor pulmonale (H) [I27.82]           Comments:            Anticoagulation Care Providers     Provider Role Specialty Phone number    Caitlyn Rees MD Referring  Family Medicine [phone]

## 2021-06-02 NOTE — H&P
H&P documented within 30 days. I have performed and assessment and examined the patient, as necessary, to update the patient's current status that may have changed since the prior History and Physical.       Yrn Arciniega MD, [facility]  Vascular and Interventional Radiology  Pager: [phone]  Clinic: [phone]

## 2021-06-02 NOTE — TELEPHONE ENCOUNTER
Who is calling:  Patient  Reason for Call:  Patient was put back on warfarin today and she is wondering what dose she should take and when she should take it. Please advise.   Date of last appointment with primary care: n/a  Okay to leave a detailed message: Yes

## 2021-06-02 NOTE — TELEPHONE ENCOUNTER
Called and spoke with nurse, Buddy at [facility] Interventional Radiology department and for IVC filter removal    INR is required to be < 3 to perform procedure. Hold warfarin only as needed based on INR    Lab Results   Component Value Date    INR 1.08 10/07/2019    INR 3.00 (H) 10/02/2019    INR 1.60 (H) 09/25/2019         Suggest INR check 10/14 or 10/15 to ensure INR is appropriate for procedure.  ACN to follow up with patient.    Daniela Marino, PharmD

## 2021-06-02 NOTE — TELEPHONE ENCOUNTER
ANTICOAGULATION  MANAGEMENT    Assessment     Today's INR result of 2.50 is Therapeutic (goal INR of 2.0-3.0)        Warfarin taken as previously instructed    No new diet changes affecting INR    No interaction expected between Colestid and warfarin    Continues to tolerate warfarin with no reported s/s of bleeding or thromboembolism     Previous INR was Supratherapeutic    Plan:     Left a detailed message for Sandy regarding INR result and instructed:     Warfarin Dosing Instructions:  Continue current warfarin dose 10 mg daily on MON / WED / SAT; and 7.5 mg daily rest of week  (0 % change)    Instructed patient to follow up no later than: 7-10 days    Education provided: no interaction anticipated between warfarin and Colestid    Instructed to call the [facility] for any changes, questions or concerns. (#[phone])   ?   Caitlyn Posey RN    Subjective/Objective:      Sandy Spencer, a 79 y.o. female is on warfarin.     Sandy reports:     Home warfarin dose: as updated on anticoagulation calendar per template     Missed doses: No     Medication changes:  Yes: Colestid     S/S of bleeding or thromboembolism:  No     New Injury or illness:  No     Changes in diet or alcohol consumption:  No     Upcoming surgery, procedure or cardioversion:  No    Anticoagulation Episode Summary     Current INR goal:   2.0-3.0   TTR:   43.7 %   Next INR check:   11/11/2019   INR from last check:   2.50 (10/30/2019)   Weekly max warfarin dose:      Target end date:      INR check location:      Preferred lab:      Send INR reminders to:   [facility]    Indications    Other chronic pulmonary embolism without acute cor pulmonale (H) [I27.82]           Comments:            Anticoagulation Care Providers     Provider Role Specialty Phone number    Caitlyn Rees MD Referring Family Medicine [phone]

## 2021-06-02 NOTE — PROGRESS NOTES
[facility] Daily Progress     Patient Name: Sandy Spencer  Date: 10/28/2019  Visit #: 5/12 +12       Referral Diagnosis: B knee pain HA;s Neck Pain  Referring provider: Michael Ramirez DO  Visit Diagnosis: R LE pain, R Knee Pain,  Abdominal pain    ICD-10-CM    1. Arthralgia of both lower legs M25.561     M25.562    2. Complex regional pain syndrome type 1 of both lower extremities G90.523    3. Chronic pain syndrome G89.4    4. Myofascial pain M79.18    5. Complex regional pain syndrome type 1 of right upper extremity G90.511    6. Abdominal pain, generalized R10.84    7. Cervicalgia M54.2          Assessment:     Patient is benefitting from skilled physical therapy and is making steady progress toward functional goals.  Patient is appropriate to continue with skilled physical therapy intervention, as indicated by initial plan of care.      Treatment Results     Goal Status:  No data recorded  No data recorded    Plan / Patient Education:     Plan to con't with manual therapy to decrease fascial tension/tone to normalize ROM/decrease inflammation/decrease mm tone and improve proprioception.      Subjective:     Pain levels 5/10 to Legs and R arm    LBP is the worse 5-6/10   Abdominal pain  6/10  6-7/10  to the R elbow   Have a chiro appt for Acupuncture but it doesn't seem to work      Objective:   + Scan  Venous   Last treatment helped the HA.                Treatment Today   10/28/2019   TREATMENT MINUTES COMMENTS   Evaluation     Self-care/ Home management          Manual therapy 60 MFR with OA, L5-SI and SI joint releases, Dural Tube Pull.   SCS to VSIG-N,    SCS to PG L T6,7,8,9,10,11-N.   SCS to PGL!,  Lunbar and Illiac lymphatics.  SCS to L  OCWK07-T,   ILL1-N,   FEML2-N  OBTL3-N,   SCIL4-N,   SCIL5-N   VNRS1,2,3,4-N   Neuromuscular Re-education     Therapeutic Activity     herapeutic Exercises     Gait training     Modality__________________                Total 60    Blank areas are  intentional and mean the treatment did not include these items.       Yogi Velazquez  10/28/2019

## 2021-06-02 NOTE — PROGRESS NOTES
Chief Complaint   Patient presents with     Diarrhea     She states that she is having severe diarrhea. This has been going on for a few weeks. It seems to be getting worse and worse with episodes of diarrhea increasing.     Gas     Increased gas. Diarrhea will come after that.      Nausea     INR Check     Injections     Has prolia injection today.        HPI: This 79-year-old female with a significantly complicated past medical history presents today with concerns about diarrhea.  Review of record shows that this has been something that has been present for many years now, and the patient has been evaluated by gastroenterology already for this.  Those notes were reviewed in detail.  At her last visit with them earlier this spring, they did not have much to offer other than continuing with her FiberCon capsules.  The patient is concerned about her diarrhea as she has an upcoming procedure to remove her IVC filter which was placed earlier this year.  She is afraid that the diarrhea will preclude her from going forward with the procedure.  She also says that a physical therapist told her that she should not go forward with the procedure because of the diarrhea.    In reviewing the notes, the patient has been recommended to take lactulose, FiberCon, and MiraLAX.  Initially the patient said that she was taking the MiraLAX, and I recommended that she stop taking this.  Then later on the conversation she said that she actually was not taking the MiraLAX.  This went back and forth multiple times and I was not able to identify precisely if she was using the MiraLAX or not.    Patient has had multiple imaging studies along with colonoscopy and stool studies related to this.  None have been able to explain her symptoms.  No severe abdominal pain, but she does get some discomfort with gas just prior to the diarrhea.  There is mild intermittent nausea as well.  Once again, none of these seem to be new things.    Fever without  chills.  No mucus in the stool.  No melena or hematochezia.  No emesis issues.  Weight has remained stable.    ROS:Review of Systems - negative except for what's listed in the HPI    SH: The Patient's  reports that she quit smoking about 19 years ago. She has a 20.00 pack-year smoking history. She has never used smokeless tobacco. She reports that she does not drink alcohol or use drugs.      FH: The Patient's family history includes Aortic aneurysm in her mother; Heart disease in her father and mother; Kidney disease in her father and mother; Stroke in her father.     Meds:    Current Outpatient Medications on File Prior to Visit   Medication Sig Dispense Refill     acetaminophen (TYLENOL) 500 MG tablet Take 650 mg by mouth 3 (three) times a day.              calcium carb/vitamin D3/vit K1 (VIACTIV ORAL) Take 1 tablet by mouth daily.       cyanocobalamin, vitamin B-12, 5,000 mcg Subl Take 1 tablet by mouth.       diclofenac sodium (VOLTAREN) 1 % Gel Apply 4 g topically 4 (four) times a day. (apply to knees Q AM and Q 2pm and prn.  May self-administer) 100 g 5     fentaNYL (DURAGESIC) 75 mcg/hr Place 1 patch on the skin every other day. 15 patch 0     ferrous sulfate 65 mg elemental iron Take 1 tablet by mouth daily with breakfast.       Lactobacillus acidoph-L.bulgar (FLORANEX) 1 million cell Tab tablet Take 1 tablet by mouth daily. 30 tablet 0     lamoTRIgine (LAMICTAL) 25 MG tablet Take 1 tablet (25 mg total) by mouth daily. 90 tablet 2     levothyroxine (LEVO-T) 50 MCG tablet Take 1 tablet (50 mcg total) by mouth Daily at 6:00 am. 30 tablet 3     MAGNESIUM ORAL Take 400 mg by mouth daily.              methocarbamol (ROBAXIN) 500 MG tablet Take 1 tablet (500 mg total) by mouth 4 (four) times a day. 90 tablet 0     rosuvastatin (CRESTOR) 40 MG tablet Take 1 tablet (40 mg total) by mouth daily. 30 tablet 3     topiramate (TOPAMAX) 50 MG tablet Take 1.5 tablets (75 mg total) by mouth 2 (two) times a day. 270 tablet  "0     traZODone (DESYREL) 100 MG tablet TAKE 2 TO 4 TABLETS(200  MG) BY MOUTH AT BEDTIME AS NEEDED FOR SLEEP 360 tablet 0     warfarin (COUMADIN/JANTOVEN) 5 MG tablet Take one to two tablets (5 to 10 mg) by mouth daily. Adjust dose based on INR results as directed. 180 tablet 1     cholecalciferol, vitamin D3, 5,000 unit Tab Take 1 tablet by mouth daily with supper.              diphenoxylate-atropine (LOMOTIL) 2.5-0.025 mg per tablet Take 1 tablet by mouth 4 (four) times a day as needed for diarrhea. 30 tablet 1     naloxone (NARCAN) 4 mg/actuation nasal spray 1 spray (4 mg dose) into one nostril for opioid reversal. Call 911. May repeat if no response in 3 minutes. 1 Box 0     promethazine (PHENERGAN) 12.5 MG tablet Take 1 tablet (12.5 mg total) by mouth every 6 (six) hours as needed for nausea. 20 tablet 1     senna-docusate (PERICOLACE) 8.6-50 mg tablet Take 2 tablets by mouth daily as needed. 30 tablet 0     SUMAtriptan (IMITREX) 50 MG tablet Take 1 tablet (50 mg total) by mouth every 2 (two) hours as needed for migraine. 27 tablet 1     Current Facility-Administered Medications on File Prior to Visit   Medication Dose Route Frequency Provider Last Rate Last Dose     denosumab 60 mg (PROLIA 60 mg/ml)  60 mg Subcutaneous Q6 Months Andrei Olivera MD   60 mg at 10/11/19 1319       O:  /70   Pulse 77   Temp 98.5  F (36.9  C) (Oral)   Ht 5' 2\" (1.575 m)   Wt 121 lb (54.9 kg)   SpO2 98%   BMI 22.13 kg/m      Physical Examination:   General appearance - alert, well appearing, and in no distress  Mental status - alert, oriented to person, place, and time  Mouth - mucous membranes moist, pharynx normal without lesions  Lymphatics - no palpable lymphadenopathy, no hepatosplenomegaly  Chest - clear to auscultation, no wheezes, rales or rhonchi, symmetric air entry  Heart - normal rate and regular rhythm, S1 and S2 normal, no murmurs noted  Abdomen -diffuse discomfort to palpation.  Bowel sounds " active through all 4 quadrants.  No masses organomegaly.  Nondistended.  Neurological - alert, oriented, normal speech, no focal findings or movement disorder noted, neck supple without rigidity, cranial nerves II through XII intact, motor and sensory grossly normal bilaterally, normal muscle tone, no tremors, strength 5/5  Extremities - peripheral pulses normal, no pedal edema, no clubbing or cyanosis  Skin - normal coloration and turgor, no rashes, no suspicious skin lesions noted      A/P:     Problem List Items Addressed This Visit     None      Visit Diagnoses     Chronic diarrhea    -  Primary    Relevant Orders    Comprehensive Metabolic Panel (Completed)    Lipase (Completed)    Pulmonary embolism (H)                1. Chronic diarrhea  Long-standing issue.  Unfortunately I do not have much to offer the patient that she is not already doing.  She feels like her electrolytes may be off and would like these checked which is reasonable.  Continue with dietary recommendations by gastroenterology.  Follow-up with them if persisting or worsening.  If she is indeed using the MiraLAX, I told her that she can definitely stop this during episodes of diarrhea.    - Comprehensive Metabolic Panel  - Lipase    2. Pulmonary embolism (H)  - INR      Total time spent with patient was at least 25 minutes, all of which was spent in counseling and coordination of care regarding their current medical problems.      Sloan Hussein, CNP

## 2021-06-02 NOTE — TELEPHONE ENCOUNTER
ANTICOAGULATION  MANAGEMENT    Assessment     10/14/19 INR result of 1.30 is Subtherapeutic (goal INR of 2.0-3.0)     ACN discussed INR result and dosing instructions with Daniela Marino, PharmD.      Warfarin taken as previously instructed    No new diet changes affecting INR    No new medication/supplements affecting INR. Patient recently switched back to Warfarin medication from Eliquis (10/8/19).    Continues to tolerate warfarin with no reported s/s of bleeding or thromboembolism     Previous INR was Subtherapeutic    Plan:     Spoke with Sandy regarding INR result and instructed:     Warfarin Dosing Instructions:  Take 10 mg on 10/14/19. Take a BOOST dose of 15 mg on 10/15/19;  then change warfarin dose to 12.5 mg daily on MON / WED / FRI; and 10 mg daily rest of week  (14.8 % change)    Instructed patient to follow up no later than: 10/18/19    Education provided: importance of therapeutic range, target INR goal and significance of current INR result and importance of taking warfarin as instructed    Sandy verbalizes understanding and agrees to warfarin dosing plan.    Instructed to call the [facility] for any changes, questions or concerns. (#[phone])   ?   Caitlyn Posey RN    Subjective/Objective:      Sandy ELSIE Spencer, a 79 y.o. female is on warfarin.     Sandy reports:     Home warfarin dose: as updated on anticoagulation calendar per template     Missed doses: No     Medication changes:  No     S/S of bleeding or thromboembolism:  No     New Injury or illness:  No     Changes in diet or alcohol consumption:  No     Upcoming surgery, procedure or cardioversion:  Yes: IVC filter removal 10/16/19 (no changes to Warfarin needed)    Anticoagulation Episode Summary     Current INR goal:   2.0-3.0   TTR:   44.5 %   Next INR check:   10/18/2019   INR from last check:   1.30! (10/14/2019)   Weekly max warfarin dose:      Target end date:      INR check location:      Preferred lab:      Send INR reminders to:    [facility]    Indications    Other chronic pulmonary embolism without acute cor pulmonale (H) [I27.82]           Comments:            Anticoagulation Care Providers     Provider Role Specialty Phone number    Caitlyn Rees MD [facility] [phone]

## 2021-06-02 NOTE — TELEPHONE ENCOUNTER
"Sandy calls today to ask Dr. Floyd is her INR of 1.3 is \"ok\" for having her IVC filter removed tomorrow. I advised that Dr. Floyd does not make the parameters for the procedure, however, I could assist her in calling the Interventional Radiology dept to get the specifics. Spoke with RN in [place] who states there are not direct thresholds for INR with the IVC filter remover. An INR of 1.3 is fine, and she can continue taking her warfarin. Called patient back and explained to her the advisement. She expressed understanding. Emilia Andre    "

## 2021-06-02 NOTE — TELEPHONE ENCOUNTER
"Dr. Rees,    Does the patient need to hold warfarin for the IVC filter removal on 10/16/19?    Thank you      Copied routing message dated 10/7/19:    \"resume old dose, adrian Nolen \"  "

## 2021-06-02 NOTE — PROCEDURES
VASCULAR AND INTERVENTIONAL RADIOLOGY PROCEDURE NOTE    Patient Name: Sandy Spencer  Medical Record Number: [id number]  YOB: 1940    Date/Time: 10/16/2019 10:41 AM    Procedure: Complex IVC filter removal     Diagnosis: DVT/Thrombosis    Medications: 5,000 Heparin    Contrast: Omnipaque 350, 20mL    Fluoroscopy Time: 45.2 min    EBL: Yes - 50 cc    Complications: NOne    Specimens Sent: None    Findings: Successful complex IVC filter removal using endobronchial forceps, all legs of the filter accounted for     Plan: Post procedure monitoring    Yrn Arciniega MD, RPVI  Vascular and Interventional Radiology  Pager: [phone]  Clinic: [phone]

## 2021-06-02 NOTE — TELEPHONE ENCOUNTER
Patient has a SUB therapeutic INR today of 1.30.   Lab Results   Component Value Date    INR 1.30 (H) 10/14/2019    INR 1.20 (H) 10/11/2019    INR 1.08 10/07/2019     Patient is scheduled for an IVC filter removal on 10/16/19.    The patient insists that her INR needs to be therapeutic for the procedure.   She insists that Dr. Sameer Floyd MD from Hematology would not do the procedure before and it was cancelled (6/4/19) due to a low INR result.    Patient stated that she will cancel the procedure scheduled on 10/16/19 if she does not hear back from the Hematology department (Dr. Floyd's team) regarding the IVF filter removal and INR level.    ACN told the patient that we would follow up with her tomorrow.    Patient is going to take Warfarin 10 mg for tonight.    Caitlyn Posey, RN, ACN  [facility] Anticoagulation Management  [phone]

## 2021-06-02 NOTE — PROGRESS NOTES
ASSESSMENT/PLAN:       1. Chronic diarrhea  -Referral for GI placed again for her to follow-up with them.  In addition to this I recommended she start colestipol again to see if there is a component of post cholecystectomy syndrome.  She will follow-up in 1 month for recheck.  Additionally I will check a celiac antibody panel to see if anything is changed.  - colestipol (COLESTID) 1 gram tablet; Take 2 tablets (2 g total) by mouth daily for 14 days, THEN 2 tablets (2 g total) 2 (two) times a day.  Dispense: 120 tablet; Refill: 1  - Ambulatory referral to Gastroenterology  - Celiac(Gluten)Antibody Panel; Future    2. Elevated lipase  -Long-standing intermittent elevation, most recently elevated a few weeks ago, referral back to GI and update levels again tomorrow.  - Ambulatory referral to Gastroenterology  - Lipase; Future    3. Cold intolerance  -Discussed that it is unlikely for her to be having a pituitary problem but certainly we can check her labs to make sure things look normal.  I did order labs as listed below, as the cortisol should be drawn in the morning she will return for labs tomorrow.  - Insulin-Like Growth Factor 1,LC-MS; Future  - Cortisol; Future    4. Acquired hypothyroidism  -Given her cold intolerance I am updating her thyroid levels to make sure those are okay.  - Thyroid Stimulating Hormone (TSH); Future  - T4, Free; Future  - T3 (Triiodothyronine), Free; Future    5. Anemia, unspecified type  -Has been iron deficient in the past, most of her anemia is likely secondary to her chronic kidney disease.  Update levels as listed below and can increase to twice daily if needed.  - Iron and Transferrin Iron Binding Capacity; Future  - Ferritin; Future  - HM1(CBC and Differential); Future    6. Chronic kidney disease (CKD) stage G3a/A1, moderately decreased glomerular filtration rate (GFR) between 45-59 mL/min/1.73 square meter and albuminuria creatinine ratio less than 30 mg/g (H)  -Due for lab  monitoring given her decreasing kidney function  - Comprehensive Metabolic Panel; Future    7. Mixed hyperlipidemia  -We will update levels today  - Lipid [place]; Future    8. Pulmonary embolism (H)  - INR      Return in about 4 weeks (around 11/27/2019) for recheck.      Caitlyn Rees MD      PROGRESS NOTE   10/30/2019    SUBJECTIVE:  Sandy Spencer is a 79 y.o. female  who presents for follow up on her chronic diarrhea.     She continues to struggle with issues in her stomach. She does find that even when she is laying down she can see what she perceives as her intestines moving inside her stomach.  She continues to have significant bloating and abdominal discomfort when she is eaten anything.  Today as of 2:45 PM she had eaten one half of an English muffin.  She does find that she is attentive gas as well, and she continues to worry about some sort of intestinal pathology as she has had most of her colon removed.  She has significant diarrhea and has been limiting what she can do day-to-day.  She typically is not incontinent but does wear depends just in case.  She does not have a gallbladder, and has been on colestipol in the past but she cannot remember if this worked for her not.    She continues to struggle with elevated lipase as well.    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 SHe is doing cupping as well through a chiropractic clinic. This is helping with her neck quite a bit. They do inject something in her skin prior to applying the cup. She is working on her headaches.  She does wonder if there is something wrong with her pituitary gland as she is so cold all the time and he feels that this could be contributing to his issues with her treatment.  Additionally she states that she is always cold, and wonders if this is because of her pituitary gland.    She has been out of her fentanyl patch for four days. She is looking into [facility].   Chief Complaint   Patient presents with     Follow-up     Patient is here today for a four week follow up. Patient would like to go over her recent blood work, wanting to know what is \"off\" and why the [facility] is where it is.      Referral     Patient would like to referral to [facility] for gas, bloating, abd pain and diarrhea.         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     Temporomandibular joint-pain-dysfunction syndrome (TMJ)     Pancreatitis     Localized swelling of lower leg     Acquired hypothyroidism     Chronic pain syndrome     Snoring     Diarrhea     Abdominal pain, generalized     Dermatochalasis of left eyelid     Bilateral carotid artery stenosis     Coronary artery disease involving native coronary artery of native heart without angina pectoris     Sinus bradycardia     Other chronic pulmonary embolism without acute cor pulmonale (H)     Splenic infarction     Opioid type dependence, continuous (H)     Hematuria     Anemia due to blood loss, acute     Hydronephrosis     Bladder spasms     Hypotension, unspecified hypotension type     Acute reaction to stress     Anxiety disorder due to medical condition     Family relationship problem     History of posttraumatic stress disorder (PTSD)     Generalized muscle weakness     Myofascial pain     Moderate major depression (H)       Current Outpatient Medications   Medication " Sig Dispense Refill     acetaminophen (TYLENOL) 500 MG tablet Take 650 mg by mouth 3 (three) times a day.              calcium carb/vitamin D3/vit K1 (VIACTIV ORAL) Take 1 tablet by mouth daily.       cyanocobalamin, vitamin B-12, 5,000 mcg Subl Take 1 tablet by mouth.       diclofenac sodium (VOLTAREN) 1 % Gel Apply 4 g topically 4 (four) times a day. (apply to knees Q AM and Q 2pm and prn.  May self-administer) 100 g 5     diphenoxylate-atropine (LOMOTIL) 2.5-0.025 mg per tablet Take 1 tablet by mouth 4 (four) times a day as needed for diarrhea. 30 tablet 1     fentaNYL (DURAGESIC) 75 mcg/hr Place 1 patch on the skin every other day. 15 patch 0     ferrous sulfate 65 mg elemental iron Take 1 tablet by mouth daily with breakfast.       hydrOXYzine pamoate (VISTARIL) 25 MG capsule Take one tab every  6-8 hours for symptoms of withdrawal 30 capsule 0     Lactobacillus acidoph-L.bulgar (FLORANEX) 1 million cell Tab tablet Take 1 tablet by mouth daily. 30 tablet 0     lamoTRIgine (LAMICTAL) 25 MG tablet Take 1 tablet (25 mg total) by mouth daily. 90 tablet 2     levothyroxine (LEVO-T) 50 MCG tablet Take 1 tablet (50 mcg total) by mouth Daily at 6:00 am. 30 tablet 3     MAGNESIUM ORAL Take 400 mg by mouth daily.              methocarbamol (ROBAXIN) 500 MG tablet Take 1 tablet (500 mg total) by mouth 4 (four) times a day. 90 tablet 0     naloxone (NARCAN) 4 mg/actuation nasal spray 1 spray (4 mg dose) into one nostril for opioid reversal. Call 911. May repeat if no response in 3 minutes. 1 Box 0     promethazine (PHENERGAN) 12.5 MG tablet Take 1 tablet (12.5 mg total) by mouth every 6 (six) hours as needed for nausea. 20 tablet 1     rosuvastatin (CRESTOR) 40 MG tablet TAKE 1 TABLET(40 MG) BY MOUTH DAILY 90 tablet 3     SUMAtriptan (IMITREX) 50 MG tablet Take 1 tablet (50 mg total) by mouth every 2 (two) hours as needed for migraine. 27 tablet 1     TiZANidine (ZANAFLEX) 2 MG capsule Take one tab every 6 hours for  symptoms of withdrawal 30 capsule 0     topiramate (TOPAMAX) 50 MG tablet Take 1.5 tablets (75 mg total) by mouth 2 (two) times a day. 270 tablet 0     traZODone (DESYREL) 100 MG tablet TAKE 2 TO 4 TABLETS(200  MG) BY MOUTH AT BEDTIME AS NEEDED FOR SLEEP 360 tablet 0     warfarin (COUMADIN/JANTOVEN) 5 MG tablet Take one to two tablets (5 to 10 mg) by mouth daily. Adjust dose based on INR results as directed. 180 tablet 1     colestipol (COLESTID) 1 gram tablet Take 2 tablets (2 g total) by mouth daily for 14 days, THEN 2 tablets (2 g total) 2 (two) times a day. 120 tablet 1     Current Facility-Administered Medications   Medication Dose Route Frequency Provider Last Rate Last Dose     denosumab 60 mg (PROLIA 60 mg/ml)  60 mg Subcutaneous Q6 Months Andrei Olivera MD   60 mg at 10/11/19 1319       Social History     Tobacco Use   Smoking Status Former Smoker     Packs/day: 1.00     Years: 20.00     Pack years: 20.00     Last attempt to quit: 2000     Years since quittin.8   Smokeless Tobacco Never Used           OBJECTIVE:        Recent Results (from the past 240 hour(s))   INR   Result Value Ref Range    INR 3.30 (H) 0.90 - 1.10   INR   Result Value Ref Range    INR 2.50 (H) 0.90 - 1.10       Vitals:    10/30/19 1400   BP: 122/62   Patient Site: Left Arm   Patient Position: Sitting   Cuff Size: Adult Regular   Pulse: 82   SpO2: 98%   Weight: 120 lb 11.2 oz (54.7 kg)     Weight: 120 lb 11.2 oz (54.7 kg)          Physical Exam:  GENERAL APPEARANCE: A&A, NAD, thin, anxious appearing  SKIN:  Normal skin turgor, no lesions/rashes   HEENT: moist mucous membranes, no rhinorrhea  NECK: Normal  CV: RRR, no M/G/R   LUNGS: CTAB  ABDOMEN: Soft, mild left lower quadrant tenderness to palpation, no guarding or rebound, hyperactive bowel sounds  EXTREMITY: no edema   NEURO: no gross deficits   Psych: Her affect is anxious, she makes normal eye contact, her thought process and speech pattern normal

## 2021-06-02 NOTE — TELEPHONE ENCOUNTER
Spoke with Sandy and scheduled an INR on FRI 10/11/19. Sandy is taking 10 mg daily until then. Sandy verbalized understanding and agreed to plan.    Caitlyn Posey RN, [facility] Anticoagulation Management  [phone]

## 2021-06-02 NOTE — TELEPHONE ENCOUNTER
Dr. Rees,    INR today is 1.08. Patient has been taking Eliquis since 10/3/19 - today.  Patient switching back to Warfarin medication due to side effects.     Upcoming procedure for IVC filter removal 10/16/19.    Do you want the patient to resume her Warfarin medication 10/8/19? (last weekly dose was 7.5 mg 1 days a week, and 10 mg 6 days a week)    For the upcoming procedure on 10/16/19 do you want the patient to Hold Warfarin medication? Bridging needed?    ACN told patient not to take Warfarin today due to taking Eliquis this morning.    ACN told patient that she would follow up with her tomorrow with further instructions.    Please advise, thank you.    Caitlyn Posey, RN, [facility] Anticoagulation Management  [phone]

## 2021-06-03 ENCOUNTER — OFFICE VISIT - HEALTHEAST (OUTPATIENT)
Dept: OCCUPATIONAL THERAPY | Facility: REHABILITATION | Age: 81
End: 2021-06-03

## 2021-06-03 VITALS
HEART RATE: 67 BPM | TEMPERATURE: 98.8 F | DIASTOLIC BLOOD PRESSURE: 80 MMHG | SYSTOLIC BLOOD PRESSURE: 120 MMHG | HEIGHT: 62 IN | WEIGHT: 119.56 LBS | OXYGEN SATURATION: 97 % | BODY MASS INDEX: 22 KG/M2

## 2021-06-03 VITALS — BODY MASS INDEX: 22.13 KG/M2 | WEIGHT: 121 LBS

## 2021-06-03 VITALS
BODY MASS INDEX: 22.26 KG/M2 | DIASTOLIC BLOOD PRESSURE: 66 MMHG | HEART RATE: 72 BPM | WEIGHT: 121 LBS | RESPIRATION RATE: 14 BRPM | SYSTOLIC BLOOD PRESSURE: 110 MMHG | HEIGHT: 62 IN

## 2021-06-03 VITALS — WEIGHT: 128.5 LBS | BODY MASS INDEX: 23.5 KG/M2

## 2021-06-03 VITALS — HEIGHT: 62 IN | WEIGHT: 121 LBS | BODY MASS INDEX: 22.26 KG/M2

## 2021-06-03 VITALS
OXYGEN SATURATION: 97 % | SYSTOLIC BLOOD PRESSURE: 146 MMHG | TEMPERATURE: 98.1 F | BODY MASS INDEX: 22.31 KG/M2 | HEART RATE: 66 BPM | WEIGHT: 122 LBS | DIASTOLIC BLOOD PRESSURE: 68 MMHG

## 2021-06-03 VITALS — BODY MASS INDEX: 22.45 KG/M2 | WEIGHT: 122 LBS | HEIGHT: 62 IN

## 2021-06-03 VITALS — BODY MASS INDEX: 22.63 KG/M2 | WEIGHT: 123 LBS | HEIGHT: 62 IN

## 2021-06-03 VITALS — BODY MASS INDEX: 22.86 KG/M2 | WEIGHT: 125 LBS

## 2021-06-03 VITALS
BODY MASS INDEX: 22.08 KG/M2 | DIASTOLIC BLOOD PRESSURE: 62 MMHG | HEART RATE: 82 BPM | SYSTOLIC BLOOD PRESSURE: 122 MMHG | OXYGEN SATURATION: 98 % | WEIGHT: 120.7 LBS

## 2021-06-03 VITALS
SYSTOLIC BLOOD PRESSURE: 130 MMHG | HEIGHT: 62 IN | BODY MASS INDEX: 22.26 KG/M2 | DIASTOLIC BLOOD PRESSURE: 70 MMHG | HEART RATE: 77 BPM | WEIGHT: 121 LBS | OXYGEN SATURATION: 98 % | TEMPERATURE: 98.5 F

## 2021-06-03 VITALS
BODY MASS INDEX: 22.26 KG/M2 | DIASTOLIC BLOOD PRESSURE: 64 MMHG | HEART RATE: 70 BPM | SYSTOLIC BLOOD PRESSURE: 124 MMHG | WEIGHT: 121.7 LBS | OXYGEN SATURATION: 98 % | TEMPERATURE: 98.9 F

## 2021-06-03 VITALS
DIASTOLIC BLOOD PRESSURE: 70 MMHG | SYSTOLIC BLOOD PRESSURE: 138 MMHG | OXYGEN SATURATION: 98 % | HEART RATE: 84 BPM | BODY MASS INDEX: 22.13 KG/M2 | WEIGHT: 121 LBS

## 2021-06-03 VITALS
HEART RATE: 97 BPM | BODY MASS INDEX: 22.26 KG/M2 | WEIGHT: 121 LBS | SYSTOLIC BLOOD PRESSURE: 121 MMHG | DIASTOLIC BLOOD PRESSURE: 64 MMHG | HEIGHT: 62 IN

## 2021-06-03 VITALS — BODY MASS INDEX: 22.33 KG/M2 | WEIGHT: 122.1 LBS

## 2021-06-03 VITALS — HEIGHT: 62 IN | BODY MASS INDEX: 22.45 KG/M2 | WEIGHT: 122 LBS

## 2021-06-03 VITALS — HEIGHT: 62 IN | BODY MASS INDEX: 21.9 KG/M2 | WEIGHT: 119 LBS

## 2021-06-03 VITALS — WEIGHT: 122 LBS | HEIGHT: 62 IN | BODY MASS INDEX: 22.45 KG/M2

## 2021-06-03 VITALS — WEIGHT: 128 LBS | BODY MASS INDEX: 23.55 KG/M2 | HEIGHT: 62 IN

## 2021-06-03 VITALS — BODY MASS INDEX: 23 KG/M2 | HEIGHT: 62 IN | WEIGHT: 125 LBS

## 2021-06-03 VITALS — BODY MASS INDEX: 23 KG/M2 | WEIGHT: 125 LBS | HEIGHT: 62 IN

## 2021-06-03 VITALS — WEIGHT: 125 LBS | HEIGHT: 62 IN | BODY MASS INDEX: 23 KG/M2

## 2021-06-03 VITALS — WEIGHT: 123.5 LBS | BODY MASS INDEX: 22.59 KG/M2

## 2021-06-03 VITALS — BODY MASS INDEX: 22.92 KG/M2 | WEIGHT: 125.3 LBS

## 2021-06-03 VITALS — WEIGHT: 119 LBS | BODY MASS INDEX: 21.9 KG/M2 | HEIGHT: 62 IN

## 2021-06-03 VITALS — WEIGHT: 121 LBS | BODY MASS INDEX: 22.13 KG/M2

## 2021-06-03 VITALS — WEIGHT: 126 LBS | HEIGHT: 62 IN | BODY MASS INDEX: 23.19 KG/M2

## 2021-06-03 VITALS — WEIGHT: 126 LBS | BODY MASS INDEX: 23.19 KG/M2 | HEIGHT: 62 IN

## 2021-06-03 DIAGNOSIS — R29.898 RIGHT HAND WEAKNESS: ICD-10-CM

## 2021-06-03 DIAGNOSIS — M79.644 PAIN IN FINGER OF RIGHT HAND: ICD-10-CM

## 2021-06-03 DIAGNOSIS — Z78.9 DECREASED ACTIVITIES OF DAILY LIVING (ADL): ICD-10-CM

## 2021-06-03 NOTE — TELEPHONE ENCOUNTER
ANTICOAGULATION  MANAGEMENT    Assessment     Today's INR result of 3.7 is Supratherapeutic (goal INR of 2.0-3.0)        Warfarin taken as previously instructed    Has not been eating well at all. However today pt will start taking one Boost a day, which can lower INR.    No new medication/supplements affecting INR    Continues to tolerate warfarin with no reported s/s of bleeding or thromboembolism     Previous INR was Therapeutic    Plan:     Spoke with Sandy regarding INR result and instructed:     Warfarin Dosing Instructions:  Take one time lower dose of 5 mg today only then continue current warfarin dose    10 mg every Mon, Wed, Sat; 7.5 mg all other days         Instructed patient to follow up no later than: 1 week. Appt is made for 11/14.     Education provided: importance of taking warfarin as instructed and potential interaction between warfarin and Boosts    Sandy verbalizes understanding and agrees to warfarin dosing plan.    Instructed to call the [facility] for any changes, questions or concerns. (#[phone])   ?   Lefty Clifton RN    Subjective/Objective:      Sandy Spencer, a 79 y.o. female is on warfarin.     Sandy reports:     Home warfarin dose: verbally confirmed home dose with pt and updated on anticoagulation calendar     Missed doses: No     Medication changes:  No     S/S of bleeding or thromboembolism:  No     New Injury or illness:  No     Changes in diet or alcohol consumption:  Yes: has not been eating well, but starting to drink Boosts today.     Upcoming surgery, procedure or cardioversion:  No    Anticoagulation Episode Summary     Current INR goal:   2.0-3.0   TTR:   43.6 %   Next INR check:   11/13/2019   INR from last check:   3.70! (11/6/2019)   Weekly max warfarin dose:      Target end date:      INR check location:      Preferred lab:      Send INR reminders to:   [facility] [place]    Indications    Other chronic pulmonary embolism without acute cor pulmonale (H)  [I27.82]           Comments:            Anticoagulation Care Providers     Provider Role Specialty Phone number    Caitlyn Rees MD Referring Family Medicine [phone]

## 2021-06-03 NOTE — TELEPHONE ENCOUNTER
Called the patient to inquire how she is feeling after holding the iron supplement this weekend. Patient states she is still having the loose stool but is able to eat. She likes that she is able to eat but wonders how long she should be off of the iron supplement?     Patient will be having a flexsig done on 9/21/19 with [facility].

## 2021-06-03 NOTE — TELEPHONE ENCOUNTER
I think it would be a good idea to use the lovenox for the next few days while her INR gets back to therapeutic.

## 2021-06-03 NOTE — TELEPHONE ENCOUNTER
ANTICOAGULATION  MANAGEMENT    Assessment     Today's INR result of 5.0 is Supratherapeutic (goal INR of 2.0-3.0)        Warfarin taken as previously instructed    Patient marked no on lab template to any diet changes, but per previous ACN note last week she reported poor appetite and she was going to start a Boost supplement. Unable to discuss/confirm this with patient today, she did not answer her phone    No new medication/supplements affecting INR    Continues to tolerate warfarin with no reported s/s of bleeding or thromboembolism     Previous INR was Supratherapeutic    Plan:     Left a detailed message for Sandy regarding INR result and instructed:     Warfarin Dosing Instructions:  hold warfarin today Thur 11/14 then change warfarin dose to 10 mg daily on Wed; and 7.5 mg daily rest of week  (8.5 % change)    Instructed patient to follow up no later than: 1 week    Education provided: importance of consistent vitamin K intake, impact of vitamin K foods on INR, importance of therapeutic range, target INR goal and significance of current INR result, monitoring for bleeding signs and symptoms, when to seek medical attention/emergency care and importance of notifying clinic for diarrhea, nausea/vomiting, reduced intake and/or illness    Instructed to call the [facility] for any changes, questions or concerns. (#[phone])   ?   Shey Tilley RN    Subjective/Objective:      Sandy ZIMMERMAN Tj, a 79 y.o. female is on warfarin.     Sandy reports:     Home warfarin dose: as updated on anticoagulation calendar per template     Missed doses: No     Medication changes:  No     S/S of bleeding or thromboembolism:  No     New Injury or illness:  No     Changes in diet or alcohol consumption:  None reported on lab template, but see previous ACN notes from 11/6     Upcoming surgery, procedure or cardioversion:  No    Anticoagulation Episode Summary     Current INR goal:   2.0-3.0   TTR:   42.0 %   Next INR check:    11/22/2019   INR from last check:   5.00! (11/14/2019)   Weekly max warfarin dose:      Target end date:      INR check location:      Preferred lab:      Send INR reminders to:   [facility] [place]    Indications    Other chronic pulmonary embolism without acute cor pulmonale (H) [I27.82]           Comments:            Anticoagulation Care Providers     Provider Role Specialty Phone number    Caitlyn Rees MD Referring Family Medicine [phone]

## 2021-06-03 NOTE — TELEPHONE ENCOUNTER
FYI - Status Update  Who is Calling: Patient  Update: Patient had their colonoscopy completed today, 11/21/19 and they had their INR check at [facility] right before the procedure and the INR was 1. Patient says they are concerned with how thick their blood is and would like a call back as soon as possible to discuss what to do next. Please call the patient back as soon as possible, thank you.  Okay to leave a detailed message?:  Yes

## 2021-06-03 NOTE — TELEPHONE ENCOUNTER
Patient needs 90 day supply on topriamate she would like to up the dosage to two pills in morning and evening. She goes to [facility].

## 2021-06-03 NOTE — TELEPHONE ENCOUNTER
Called and spoke to patient.    She reports that she had a colonoscopy today and her INR was 1. Apparently she spoke to [facility] on Fri and was told to start holding her warfarin. Advised patient that INR of 1 would be an expected result as she has not had warfarin in a week.    She was given the okay by [facility] to resume warfarin today, states she already took 10mg today when she got home.     Upon review of medical history and patient chart she has previously been bridged for procedures. Advised her that ACN will discuss with PCP and call her back later today as she may need to start lovenox bridging until INR is therapeutic.     Dr. Rees, please advise if you want Sandy to start post procedure lovenox bridging and if so per previous orders (from 2019) or no bridging required at this time?    Sandy does have ~5 syringes of enoxaparin left from 2019 procedure. RX was for enoxaparin 60mg/0.6ml syringe, inject 0.6 mL (60 mg total) under the skin every 12 (twelve) hours. If you recommend bridging please confirm if patient can use this same RX (assuming it is not ) and she should start lovenox tomorrow (~24 hours post procedure) until INR is >/=2.0?

## 2021-06-03 NOTE — TELEPHONE ENCOUNTER
Called and spoke with the patient, she is aware and agrees with medication dosage plan.      While on the phone, patient stated she is still having troubles with the loose stool / diarrhea. Patient states at the first of the month, she weighed 123 lbs at gastro and now she weighs 114. Patient is worried about this. Patient knows that the Iron pills are causing the loose stools. Right after taking the iron pills she feels gassy and nauseous.     What can she do for this? Next apt with Dr. Rees on 11/26.

## 2021-06-03 NOTE — TELEPHONE ENCOUNTER
ANTICOAGULATION  MANAGEMENT    Assessment     Today's INR result of 2.0 is Therapeutic (goal INR of 2.0-3.0)        Warfarin taken as previously instructed  Note that patient missed doses between 11/15-11/20    No new diet changes affecting INR    No new medication/supplements affecting INR    Continues to tolerate warfarin with no reported s/s of bleeding or thromboembolism     Previous INR was Supratherapeutic    Plan:     Spoke with Sandy regarding INR result and instructed:     Warfarin Dosing Instructions:  Continue current warfarin dose 7.5 mg daily and discontinue Lovenox  Instructed patient to follow up no later than: one week    Education provided: importance of therapeutic range    Sandy verbalizes understanding and agrees to warfarin dosing plan.    Instructed to call the [facility] for any changes, questions or concerns. (#[phone])   ?   Jayla Mcnulty RN    Subjective/Objective:      Sandy Spencer, a 79 y.o. female is on warfarin.     Sandy reports:     Home warfarin dose: template incorrect; verbally confirmed home dose with Sandy and updated on anticoagulation calendar     Missed doses: Yes: 11/15-11/20     Medication changes:  No     S/S of bleeding or thromboembolism:  No     New Injury or illness:  No     Changes in diet or alcohol consumption:  No     Upcoming surgery, procedure or cardioversion:  No    Anticoagulation Episode Summary     Current INR goal:   2.0-3.0   TTR:   41.6 % (7.8 mo)   Next INR check:   12/3/2019   INR from last check:   2.00 (11/26/2019)   Weekly max warfarin dose:      Target end date:      INR check location:      Preferred lab:      Send INR reminders to:   [facility]    Indications    Other chronic pulmonary embolism without acute cor pulmonale (H) [I27.82]           Comments:            Anticoagulation Care Providers     Provider Role Specialty Phone number    Caitlyn Rees MD Referring Family Medicine [phone]

## 2021-06-03 NOTE — TELEPHONE ENCOUNTER
Who is calling:  Patient Sandy  Reason for Call:  Calling to speak with Shey. Sandy did not get your message left. Please call her back to discuss further. Set up INR appointment for 11-22.  Date of last appointment with primary care: 11-14-19  Okay to leave a detailed message: No, patient did not get your last message.  Patient thinks it has something to do with the building she is in.    The reception was muffled and volume in/out during this call.

## 2021-06-03 NOTE — TELEPHONE ENCOUNTER
"Spoke to patient, reviewed dosing VM that was left for her yesterday. She has problems with her phone, did not get the message last night but it just came through today.     She confirms that she did not take warfarin yesterday.    States she cannot eat at all, she is waiting a call back from GI who is going to probably schedule her for a procedure \"to stretch the opening between my intestines\".    She is having 1 Boost drink daily, states she has lost 7 pounds in one week.    Instructed patient to further reduced warfarin dose: partial hold today, Fri 11/15 take 5mg. Then change dose to 7.5mg daily (an additional 4% reduction from what was instructed yesterday).    INR in a week, which is already scheduled.     Did ask patient to call back after she speaks to GI if they schedule her for a procedure.     Patient verbalized understanding and had no other questions.       "

## 2021-06-03 NOTE — PROGRESS NOTES
ASSESSMENT/PLAN:       1. Anemia, unspecified type  -will check labs as listed to see if I can identify why she is still anemic, if it is just her CKD or due to B12 deficiency. If her B12 is normal will refer for Venofer infusions, if her B12 is low she would like to start B12 injections.   -Discussed we may need to get her into see the nephrologists as well to discuss Epoetin alpha injections as well if things are not improving.   - Reticulocytes  - HM2(CBC w/o Differential)  - Folate, Serum; Future  - Vitamin B12; Future  - Iron and Transferrin Iron Binding Capacity; Future  - Ferritin; Future    2. Chronic kidney disease (CKD) stage G3a/A1, moderately decreased glomerular filtration rate (GFR) between 45-59 mL/min/1.73 square meter and albuminuria creatinine ratio less than 30 mg/g (H)  -updating labs today, consider referral to nephrology  - Comprehensive Metabolic Panel; Future    3. Chronic diarrhea  -Improving some, will stop the Colestipol and see if symptoms remain stable. F/u with GI as well as they recommend    4. Mixed hyperlipidemia  -Dose increased by cardiology and she is having issues splitting the pills, will update prescription to help with this  - rosuvastatin (CRESTOR) 20 MG tablet; Take 1 tablet (20 mg total) by mouth daily. With one 40 mg tablet to equal 60 mg daily  Dispense: 90 tablet; Refill: 1    5. Pulmonary embolism (H)  - INR        Return in about 4 weeks (around 12/24/2019).      Caitlyn Rees MD      PROGRESS NOTE   11/26/2019    SUBJECTIVE:  Sandy Spencer is a 79 y.o. female  who presents for follow up.     She is still quite tired. She did have a colonoscopy and a CT scan done and they were relatively ok.The colonoscopy did show some ulcerations and she was told to not take any more iron. She wonders if she maybe has B12 deficiency again as she has had this in the past and did do B12 injections. She does wonder if there is anything else that she can do to help with  her anemia. She still feels quite tired.     She does feel better off the iron. She is better from a gassy perspective, she doesn't have as much explosive diarrhea as well. She does think that her stool looks more like food than liquid. She is hoping to stop the Colestipol as she doesn't feel that it helped and is almost done with the canister that she has.     Still having issues eating, is drinking more boost when she isn't eating. Her weight is stablizing.   Chief Complaint   Patient presents with     Follow-up     Test Results     INR Check         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     Temporomandibular joint-pain-dysfunction syndrome (TMJ)     Pancreatitis     Localized swelling of lower leg     Acquired hypothyroidism     Chronic pain syndrome     Snoring     Diarrhea     Abdominal pain, generalized     Dermatochalasis of left eyelid     Bilateral carotid artery stenosis     Coronary artery disease involving native coronary artery of native heart without angina pectoris     Sinus bradycardia     Other chronic pulmonary embolism without acute cor pulmonale (H)     Splenic infarction     Opioid type dependence, continuous (H)     Hematuria     Anemia due to blood loss, acute     Hydronephrosis     Bladder spasms     Hypotension, unspecified hypotension type     Acute reaction to stress     Anxiety disorder due to medical condition     Family  relationship problem     History of posttraumatic stress disorder (PTSD)     Generalized muscle weakness     Myofascial pain     Moderate major depression (H)       Current Outpatient Medications   Medication Sig Dispense Refill     acetaminophen (TYLENOL) 500 MG tablet Take 650 mg by mouth 3 (three) times a day.              calcium carb/vitamin D3/vit K1 (VIACTIV ORAL) Take 1 tablet by mouth daily.       colestipol (COLESTID) 1 gram tablet Take 2 tablets (2 g total) by mouth daily for 14 days, THEN 2 tablets (2 g total) 2 (two) times a day. 120 tablet 1     cyanocobalamin, vitamin B-12, 5,000 mcg Subl Take 1 tablet by mouth.       diclofenac sodium (VOLTAREN) 1 % Gel Apply 4 g topically 4 (four) times a day. (apply to knees Q AM and Q 2pm and prn.  May self-administer) 100 g 5     enoxaparin (LOVENOX) 60 mg/0.6 mL syringe Inject 0.6 mL (60 mg total) under the skin every 12 (twelve) hours. 10 Syringe 1     [START ON 11/29/2019] fentaNYL (DURAGESIC) 75 mcg/hr Place 1 patch on the skin every other day. 15 patch 0     hydrOXYzine pamoate (VISTARIL) 25 MG capsule Take one tab every  6-8 hours for symptoms of withdrawal 30 capsule 0     Lactobacillus acidoph-L.bulgar (FLORANEX) 1 million cell Tab tablet Take 1 tablet by mouth daily. 30 tablet 0     lamoTRIgine (LAMICTAL) 25 MG tablet One twice a day for two weeks, then two morning and one bedtime 90 tablet 2     levothyroxine (LEVO-T) 50 MCG tablet Take 1 tablet (50 mcg total) by mouth Daily at 6:00 am. 30 tablet 3     MAGNESIUM ORAL Take 400 mg by mouth daily.              methocarbamol (ROBAXIN) 500 MG tablet Take 1 tablet (500 mg total) by mouth 4 (four) times a day. 360 tablet 3     naloxone (NARCAN) 4 mg/actuation nasal spray 1 spray (4 mg dose) into one nostril for opioid reversal. Call 911. May repeat if no response in 3 minutes. 1 Box 0     promethazine (PHENERGAN) 12.5 MG tablet Take 1 tablet (12.5 mg total) by mouth every 6 (six) hours as needed for nausea. 20  tablet 1     rosuvastatin (CRESTOR) 40 MG tablet Take 1.5 tablets (60 mg total) by mouth at bedtime. 135 tablet 3     topiramate (TOPAMAX) 100 MG tablet Take 1 tablet (100 mg total) by mouth 2 (two) times a day. 180 tablet 1     traZODone (DESYREL) 100 MG tablet TAKE 2 TO 4 TABLETS(200  MG) BY MOUTH AT BEDTIME AS NEEDED FOR SLEEP 360 tablet 0     ferrous sulfate 325 (65 FE) MG tablet Take 1 tablet (325 mg total) by mouth daily with breakfast. 90 tablet 3     rosuvastatin (CRESTOR) 20 MG tablet Take 1 tablet (20 mg total) by mouth daily. With one 40 mg tablet to equal 60 mg daily 90 tablet 1     SUMAtriptan (IMITREX) 50 MG tablet Take 1 tablet (50 mg total) by mouth every 2 (two) hours as needed for migraine. 27 tablet 1     warfarin (COUMADIN/JANTOVEN) 5 MG tablet Take one to two tablets (5 to 10 mg) by mouth daily. Adjust dose based on INR results as directed. 180 tablet 1     Current Facility-Administered Medications   Medication Dose Route Frequency Provider Last Rate Last Dose     denosumab 60 mg (PROLIA 60 mg/ml)  60 mg Subcutaneous Q6 Months Andrei Olivera MD   60 mg at 10/11/19 1319       Social History     Tobacco Use   Smoking Status Former Smoker     Packs/day: 1.00     Years: 20.00     Pack years: 20.00     Last attempt to quit: 2000     Years since quittin.9   Smokeless Tobacco Never Used           OBJECTIVE:        Recent Results (from the past 240 hour(s))   INR   Result Value Ref Range    INR 2.00 (H) 0.90 - 1.10   Reticulocytes   Result Value Ref Range    Retic Absolute Count 0.037 0.010 - 0.110 mill/uL    Retic Ct Pct 1.00 0.8 - 2.7 %   HM2(CBC w/o Differential)   Result Value Ref Range    WBC 5.6 4.0 - 11.0 thou/uL    RBC 3.74 (L) 3.80 - 5.40 mill/uL    Hemoglobin 12.6 12.0 - 16.0 g/dL    Hematocrit 36.2 35.0 - 47.0 %    MCV 97 80 - 100 fL    MCH 33.6 27.0 - 34.0 pg    MCHC 34.7 32.0 - 36.0 g/dL    RDW 11.7 11.0 - 14.5 %    Platelets 156 140 - 440 thou/uL    MPV 7.7 7.0 - 10.0  "fL       Vitals:    11/26/19 1508   BP: 118/72   Pulse: 86   Weight: 114 lb (51.7 kg)   Height: 5' 2\" (1.575 m)     Weight: 114 lb (51.7 kg)          Physical Exam:  GENERAL APPEARANCE: A&A, NAD, well hydrated, well nourished, frail  SKIN:  Normal skin turgor, no lesions/rashes   HEENT: moist mucous membranes, no rhinorrhea  NECK: Normal  CV: RRR, no M/G/R   LUNGS: CTAB  EXTREMITY: no edema   NEURO: no gross deficits   Psych: affect is appropriate    "

## 2021-06-03 NOTE — TELEPHONE ENCOUNTER
Updated prescription, rather than going to two pills in the morning and at night of the 50 mg pills I sent a prescription for 100 mg pills to take two times a day, so just one at a time.

## 2021-06-03 NOTE — TELEPHONE ENCOUNTER
Called and spoke to patient, advised as below.   Offered INR check on Mon, she prefers to wait until her OV on Tue.  ACN in agreement with boost dose of warfarin that Sandy already took today (10mg) and instructed her to now resume previous maintenance dose of 7.5mg daily. Did not recommend a higher boost dose as INR ws 5.0 just one week ago.    Refilled lovenox to preferred pharmacy.    Patient verbalized understanding and had no other questions.

## 2021-06-03 NOTE — TELEPHONE ENCOUNTER
Called and spoke with the patient, she is aware. Will hold iron supplement until next apt on 11/26/19.

## 2021-06-03 NOTE — PROGRESS NOTES
[facility] Daily Progress     Patient Name: Sandy Spencer  Date: 11/7/2019  Visit #: 6/12 +12       Referral Diagnosis: Abdominal Pain   HA;s Neck Back Pain  Referring provider: Michael Ramirez DO  Visit Diagnosis: R LE pain, R Knee Pain,  Abdominal pain    ICD-10-CM    1. Complex regional pain syndrome type 1 of both lower extremities G90.523    2. Chronic pain syndrome G89.4    3. Myofascial pain M79.18    4. Abdominal pain, generalized R10.84    5. Complex regional pain syndrome type 1 of right upper extremity G90.511    6. Cervicalgia M54.2    7. 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 M48.07    8. Acute right-sided low back pain without sciatica M54.5          Assessment:     Results of Treatment   Pain levels at all sites resolved 0/10  MF release to the colon via strain counterstrain normalized visceral motility and quieted the visceral noises, referred pain she was experiencing from the colon.  RSD pain to the low back and LE's resolved    Patient is benefitting from skilled physical therapy and is making steady progress toward functional goals.  Patient is appropriate to continue with skilled physical therapy intervention, as indicated by initial plan of care.      Treatment Results     Goal Status:  No data recorded  No data recorded    Plan / Patient Education:     Plan to con't with manual therapy to decrease fascial tension/tone to normalize ROM/decrease inflammation/decrease mm tone and improve proprioception.      Subjective:     Pain levels 5/10 to Legs and R arm    LBP 5/10   Abdominal pain  5/10  HA 5/10    Objective:   + Scan Cranial bones Visceral  Colon,  Ligamentous Low Back.                Treatment Today   11/7/2019   TREATMENT MINUTES COMMENTS   Evaluation     Self-care/ Home management          Manual therapy 45 MFR with OA, L5-SI and SI joint releases, Dural Tube Pull.   Cranial releases to Temporal Frontal, Nasal bones,  SCS to Ant L5  ACE,  TRIG 1,2,3-N L   SCS  to Sigmoid Colon Med, Lat Inf     Neuromuscular Re-education 15 KTing  Bilat Sciatic nerves   L4-5 Disc   Therapeutic Activity     herapeutic Exercises     Gait training     Modality__________________                Total 60    Blank areas are intentional and mean the treatment did not include these items.       Yogi Velazquez  11/7/2019

## 2021-06-03 NOTE — TELEPHONE ENCOUNTER
Called and spoke with the patient, she is aware. Will hold iron supplement this weekend and will call back on Monday 11/18 with report of how she is feeling.

## 2021-06-03 NOTE — TELEPHONE ENCOUNTER
Called and spoke with AMANUEL SALAZAR. Patient is to have a Felxsig done on 11/21/19 with a CT scan prior. The CT scan will have contrast.     Called and spoke with the patient, she is worried about the contrast. She is aware to discuss this with [facility] as they are the ones who ordered it. Patient aware that Dr. Rees is aware of this and agrees with the plan if [facility] is ordering it, they wouldn't order something if it were to hurt the patient.     Patient will call [facility] to discuss.

## 2021-06-03 NOTE — TELEPHONE ENCOUNTER
ANTICOAGULATION  MANAGEMENT    Assessment     Today's INR result of 1.9 is Subtherapeutic (goal INR of 2.0-3.0)        Warfarin taken as previously instructed    No new diet changes affecting INR    No new medication/supplements affecting INR    Continues to tolerate warfarin with no reported s/s of bleeding or thromboembolism     Previous INR was Therapeutic    Plan:     Spoke with Sandy regarding INR result and instructed:     Warfarin Dosing Instructions: 7.5mg today 12/3 (already took dose this am) then Change warfarin dose to 10 mg daily on Wed, Sat; and 7.5 mg daily rest of week  (9.5 % change)    Instructed patient to follow up no later than: 1-2 weeks     Education provided: importance of therapeutic range    Sandy verbalizes understanding and agrees to warfarin dosing plan.    Instructed to call the [facility] for any changes, questions or concerns. (#[phone])   ?   Jaye Momin RN    Subjective/Objective:      Sandy ELSIE Spencer, a 79 y.o. female is on warfarin.     Sandy reports:     Home warfarin dose: verbally confirmed home dose with Sandy and updated on anticoagulation calendar     Missed doses: No     Medication changes:  No     S/S of bleeding or thromboembolism:  No     New Injury or illness:  No     Changes in diet or alcohol consumption:  No     Upcoming surgery, procedure or cardioversion:  No    Anticoagulation Episode Summary     Current INR goal:   2.0-3.0   TTR:   40.4 % (8.1 mo)   Next INR check:   12/17/2019   INR from last check:   1.90! (12/3/2019)   Weekly max warfarin dose:      Target end date:      INR check location:      Preferred lab:      Send INR reminders to:   [facility]    Indications    Other chronic pulmonary embolism without acute cor pulmonale (H) [I27.82]           Comments:            Anticoagulation Care Providers     Provider Role Specialty Phone number    Caitlyn Rees MD Referring Family Medicine [phone]

## 2021-06-03 NOTE — PATIENT INSTRUCTIONS - HE
PLAN:    Continue methocarbamol 500 mg four times a day      Lamotrigine 25 mg one twice a day for two weeks, then two morning and one bedtme    Continue Fentanyl 75 mcg every other day    Continue working with Dr. Thomas with frequency that works for you    Return as scheduled

## 2021-06-03 NOTE — TELEPHONE ENCOUNTER
Who is calling:  Patient   Reason for Call:  Patient calling back to finish conversation with [facility]. States her conversation with [facility] was disconnected and she would like to finish call.   Okay to leave a detailed message: No

## 2021-06-03 NOTE — PROGRESS NOTES
"Assessment/Plan:     Problem List Items Addressed This Visit     RSD upper limb, right    Relevant Medications    lamoTRIgine (LAMICTAL) 25 MG tablet    Lumbar radiculopathy    Relevant Medications    fentaNYL (DURAGESIC) 75 mcg/hr (Start on 11/29/2019)    Chronic pain syndrome - Primary    Myofascial pain    Relevant Medications    methocarbamol (ROBAXIN) 500 MG tablet              No follow-ups on file.    Patient Instructions   PLAN:    Continue methocarbamol 500 mg four times a day      Lamotrigine 25 mg one twice a day for two weeks, then two morning and one bedtme    Continue Fentanyl 75 mcg every other day    Continue working with Dr. Thomas with frequency that works for you    Return as scheduled        Subjective:       79 y.o. female presents for evaluation pain which includes lower extremity chronic regional pain syndrome, migraine headaches.    She reports she is going \"downhill\".  She cannot eat, has nausea and pain.  She is working with the GI program, will have a colonoscopy.  She describes having significant problems with loose stools, describes different colors.  She does eat feels as if there is a \"baby kicking\", indicates its difficult for digestion.    Has pain in her left lower quadrant, the area where 41 years ago she had a subtotal colectomy.    She has been using a variety of medications.  She has to change her iron supplementations.    She has been going to chiropractor doing electromagnetic therapies and some cupping.  She describes the first time had a \"rock in her neck\" muscle that released.  There may be some benefit with her foot.  Not clearly helping with her knees.  She does not feel she can go there the twice a week as frequently as they would like.    She continues using boost supplements.  Her weight is gone from 1 21-1 14 over the last week.    She has been using the methocarbamol 500 mg 4 times a day which she thinks helps her general spasms and would like a 90-day " refill.    Has been using lamotrigine 25 mg.  She thinks in [place] she had used this in a higher dose with some benefit.    She continues with the fentanyl 75 mcg patch every other day tolerating that well.    Reviews she is seen her children and grandchildren, going to a concert soon.     Reviewing the medication list she has been on Topamax long-standing, does not feel that is causing issues with her GI system.  No other medication changes she can relate to.     is reviewed as expected      Current Outpatient Medications:      acetaminophen (TYLENOL) 500 MG tablet, Take 650 mg by mouth 3 (three) times a day.    , Disp: , Rfl:      calcium carb/vitamin D3/vit K1 (VIACTIV ORAL), Take 1 tablet by mouth daily., Disp: , Rfl:      colestipol (COLESTID) 1 gram tablet, Take 2 tablets (2 g total) by mouth daily for 14 days, THEN 2 tablets (2 g total) 2 (two) times a day., Disp: 120 tablet, Rfl: 1     cyanocobalamin, vitamin B-12, 5,000 mcg Subl, Take 1 tablet by mouth., Disp: , Rfl:      diclofenac sodium (VOLTAREN) 1 % Gel, Apply 4 g topically 4 (four) times a day. (apply to knees Q AM and Q 2pm and prn.  May self-administer), Disp: 100 g, Rfl: 5     [START ON 11/29/2019] fentaNYL (DURAGESIC) 75 mcg/hr, Place 1 patch on the skin every other day., Disp: 15 patch, Rfl: 0     ferrous sulfate 325 (65 FE) MG tablet, Take 1 tablet (325 mg total) by mouth daily with breakfast., Disp: 90 tablet, Rfl: 3     hydrOXYzine pamoate (VISTARIL) 25 MG capsule, Take one tab every  6-8 hours for symptoms of withdrawal, Disp: 30 capsule, Rfl: 0     Lactobacillus acidoph-L.bulgar (FLORANEX) 1 million cell Tab tablet, Take 1 tablet by mouth daily., Disp: 30 tablet, Rfl: 0     lamoTRIgine (LAMICTAL) 25 MG tablet, One twice a day for two weeks, then two morning and one bedtime, Disp: 90 tablet, Rfl: 2     levothyroxine (LEVO-T) 50 MCG tablet, Take 1 tablet (50 mcg total) by mouth Daily at 6:00 am., Disp: 30 tablet, Rfl: 3     MAGNESIUM ORAL,  "Take 400 mg by mouth daily.    , Disp: , Rfl:      methocarbamol (ROBAXIN) 500 MG tablet, Take 1 tablet (500 mg total) by mouth 4 (four) times a day., Disp: 360 tablet, Rfl: 3     naloxone (NARCAN) 4 mg/actuation nasal spray, 1 spray (4 mg dose) into one nostril for opioid reversal. Call 911. May repeat if no response in 3 minutes., Disp: 1 Box, Rfl: 0     promethazine (PHENERGAN) 12.5 MG tablet, Take 1 tablet (12.5 mg total) by mouth every 6 (six) hours as needed for nausea., Disp: 20 tablet, Rfl: 1     rosuvastatin (CRESTOR) 40 MG tablet, Take 1.5 tablets (60 mg total) by mouth at bedtime., Disp: 135 tablet, Rfl: 3     SUMAtriptan (IMITREX) 50 MG tablet, Take 1 tablet (50 mg total) by mouth every 2 (two) hours as needed for migraine., Disp: 27 tablet, Rfl: 1     topiramate (TOPAMAX) 50 MG tablet, Take 1.5 tablets (75 mg total) by mouth 2 (two) times a day., Disp: 270 tablet, Rfl: 0     traZODone (DESYREL) 100 MG tablet, TAKE 2 TO 4 TABLETS(200  MG) BY MOUTH AT BEDTIME AS NEEDED FOR SLEEP, Disp: 360 tablet, Rfl: 0     warfarin (COUMADIN/JANTOVEN) 5 MG tablet, Take one to two tablets (5 to 10 mg) by mouth daily. Adjust dose based on INR results as directed., Disp: 180 tablet, Rfl: 1    Current Facility-Administered Medications:      denosumab 60 mg (PROLIA 60 mg/ml), 60 mg, Subcutaneous, Q6 Months, Andrei Olivera MD, 60 mg at 10/11/19 1319  She is alert with a clear sensorium appropriately groomed.  Thought process tight logical.  Does indeed appear to have lost weight.           Objective:     Vitals:    11/11/19 1339   BP: 124/79   Pulse: 99   Weight: 114 lb (51.7 kg)   Height: 5' 2\" (1.575 m)   PainSc:   8   PainLoc: Leg       Plan: She will continue working closely with the GI doctors.    We will increase the lamotrigine to see if this can helps to some extent with central sensitization component.    She will continue to work with the chiropractor and the electromagnetic frequency therapy, " determining how often she can manage to get there she describes a 30 [address] each way.    Time spent more than 15 minutes face-to-face, 50% count about above condition coronation treatment plan          This note has been dictated using voice recognition software. Any grammatical or context distortions are unintentional and inherent to the software

## 2021-06-03 NOTE — TELEPHONE ENCOUNTER
Patient would like some iron sent to [facility]. She does not want to do over the counter anymore.

## 2021-06-03 NOTE — TELEPHONE ENCOUNTER
Called and spoke with the patient, she is aware.     Dr. Rees, is there anyway that you could dictate the labs from 10/31/19. Patient is wanting a letter sent to her house with 10/31/19 lab results.

## 2021-06-03 NOTE — TELEPHONE ENCOUNTER
Who is calling:  Patient  Reason for Call:  Patient stated [facility] wants her to do a procedure that is similar to a colonoscopy. Patient does not know the name of it. Patient stated the procedure is scheduled on 11/21/19. Patient stated that prior to the procedure, they want the patient to have an US with dye done. 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 Patient stated she would like Debra to call over to [facility] to explain to them that she cannot do the US with dye due to only having one kidney. Patient was transferred to medical records to have her last US kidney faxed to [facility].  Date of last appointment with primary care: 10/30/19  Okay to leave a detailed message: No  [phone]

## 2021-06-03 NOTE — PROGRESS NOTES
[facility] Daily Progress     Patient Name: Sandy Spencer  Date: 11/13/2019  Visit #: 7/12 +12       Referral Diagnosis: Abdominal Pain   HA;s Neck Back Pain  Referring provider: Michael Ramirez DO  Visit Diagnosis: R LE pain, R Knee Pain,  Abdominal pain    ICD-10-CM    1. Complex regional pain syndrome type 1 of both lower extremities G90.523    2. Chronic pain syndrome G89.4    3. Myofascial pain M79.18    4. Complex regional pain syndrome type 1 of right upper extremity G90.511    5. 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 Acute right-sided low back pain without sciatica M54.5    7. Reflex sympathetic dystrophy G90.50          Assessment:     Results of Treatment      Patient is benefitting from skilled physical therapy and is making steady progress toward functional goals.  Patient is appropriate to continue with skilled physical therapy intervention, as indicated by initial plan of care.      Treatment Results   LBP reduced 0/10 to the Painful L side     Goal Status:  No data recorded  No data recorded    Plan / Patient Education:     Plan to con't with manual therapy to decrease fascial tension/tone to normalize ROM/decrease inflammation/decrease mm tone and improve proprioception.      Subjective:     Pain levels 5/10 to Legs and R arm    LBP 5/10   Abdominal pain  6/10  No HA  today    Objective:   + Scan                 Treatment Today   11/13/2019   TREATMENT MINUTES COMMENTS   Evaluation     Self-care/ Home management          Manual therapy 60 MFR with OA, L5-SI and SI joint releases, Dural Tube Pull.SCS to Sigmoid, Cecum Med, Lat, Inf.   SCS to R CLAIRE-LV,   CLAIRE-A.   SCS   R DOBT-N   SCS to L S1,2,3-N,   SCIL1-N L,   RTDS58-V,   ILL!-N,   FEM-N,   OBT-N    Neuromuscular Re-education     Therapeutic Activity     herapeutic Exercises     Gait training     Modality__________________                Total 60    Blank areas are intentional and mean the treatment did not  include these items.       Yogi Velazquez  11/13/2019

## 2021-06-04 VITALS
SYSTOLIC BLOOD PRESSURE: 144 MMHG | OXYGEN SATURATION: 96 % | RESPIRATION RATE: 20 BRPM | HEART RATE: 96 BPM | BODY MASS INDEX: 24.28 KG/M2 | DIASTOLIC BLOOD PRESSURE: 89 MMHG | WEIGHT: 134.9 LBS | TEMPERATURE: 98.6 F

## 2021-06-04 VITALS
OXYGEN SATURATION: 98 % | DIASTOLIC BLOOD PRESSURE: 80 MMHG | WEIGHT: 137.3 LBS | SYSTOLIC BLOOD PRESSURE: 140 MMHG | HEART RATE: 86 BPM | BODY MASS INDEX: 25.11 KG/M2

## 2021-06-04 VITALS — HEIGHT: 62 IN | WEIGHT: 138 LBS | BODY MASS INDEX: 25.4 KG/M2

## 2021-06-04 VITALS
HEART RATE: 92 BPM | DIASTOLIC BLOOD PRESSURE: 72 MMHG | SYSTOLIC BLOOD PRESSURE: 136 MMHG | BODY MASS INDEX: 22.55 KG/M2 | WEIGHT: 123.3 LBS | OXYGEN SATURATION: 98 %

## 2021-06-04 VITALS
DIASTOLIC BLOOD PRESSURE: 72 MMHG | BODY MASS INDEX: 20.98 KG/M2 | WEIGHT: 114 LBS | HEART RATE: 86 BPM | HEIGHT: 62 IN | SYSTOLIC BLOOD PRESSURE: 118 MMHG

## 2021-06-04 VITALS
DIASTOLIC BLOOD PRESSURE: 66 MMHG | HEART RATE: 84 BPM | WEIGHT: 124 LBS | SYSTOLIC BLOOD PRESSURE: 100 MMHG | BODY MASS INDEX: 22.82 KG/M2 | HEIGHT: 62 IN

## 2021-06-04 VITALS
DIASTOLIC BLOOD PRESSURE: 60 MMHG | BODY MASS INDEX: 23.79 KG/M2 | OXYGEN SATURATION: 93 % | WEIGHT: 129.25 LBS | RESPIRATION RATE: 18 BRPM | HEART RATE: 83 BPM | TEMPERATURE: 98.8 F | SYSTOLIC BLOOD PRESSURE: 118 MMHG | HEIGHT: 62 IN

## 2021-06-04 VITALS
DIASTOLIC BLOOD PRESSURE: 74 MMHG | OXYGEN SATURATION: 98 % | WEIGHT: 123.3 LBS | HEART RATE: 70 BPM | BODY MASS INDEX: 22.55 KG/M2 | SYSTOLIC BLOOD PRESSURE: 136 MMHG

## 2021-06-04 VITALS — WEIGHT: 125.1 LBS | BODY MASS INDEX: 23.02 KG/M2 | HEIGHT: 62 IN

## 2021-06-04 VITALS
HEIGHT: 63 IN | WEIGHT: 134 LBS | DIASTOLIC BLOOD PRESSURE: 60 MMHG | BODY MASS INDEX: 23.74 KG/M2 | OXYGEN SATURATION: 96 % | SYSTOLIC BLOOD PRESSURE: 118 MMHG | HEART RATE: 78 BPM | RESPIRATION RATE: 16 BRPM

## 2021-06-04 VITALS — WEIGHT: 132 LBS | BODY MASS INDEX: 23.39 KG/M2 | HEIGHT: 63 IN

## 2021-06-04 VITALS
DIASTOLIC BLOOD PRESSURE: 60 MMHG | BODY MASS INDEX: 22.97 KG/M2 | HEART RATE: 80 BPM | SYSTOLIC BLOOD PRESSURE: 92 MMHG | WEIGHT: 125.6 LBS

## 2021-06-04 VITALS — WEIGHT: 128.6 LBS | BODY MASS INDEX: 23.15 KG/M2

## 2021-06-04 VITALS — HEIGHT: 63 IN | BODY MASS INDEX: 22.68 KG/M2 | WEIGHT: 128 LBS

## 2021-06-04 VITALS — BODY MASS INDEX: 22.82 KG/M2 | WEIGHT: 124 LBS | HEIGHT: 62 IN

## 2021-06-04 VITALS
HEART RATE: 83 BPM | SYSTOLIC BLOOD PRESSURE: 110 MMHG | DIASTOLIC BLOOD PRESSURE: 72 MMHG | BODY MASS INDEX: 23.14 KG/M2 | WEIGHT: 126.5 LBS | OXYGEN SATURATION: 98 %

## 2021-06-04 VITALS
OXYGEN SATURATION: 97 % | BODY MASS INDEX: 22.52 KG/M2 | WEIGHT: 123.1 LBS | HEART RATE: 85 BPM | DIASTOLIC BLOOD PRESSURE: 58 MMHG | SYSTOLIC BLOOD PRESSURE: 112 MMHG

## 2021-06-04 VITALS — BODY MASS INDEX: 20.98 KG/M2 | HEIGHT: 62 IN | WEIGHT: 114 LBS

## 2021-06-04 VITALS — BODY MASS INDEX: 24.3 KG/M2 | WEIGHT: 135 LBS

## 2021-06-04 VITALS
OXYGEN SATURATION: 98 % | DIASTOLIC BLOOD PRESSURE: 68 MMHG | HEART RATE: 78 BPM | BODY MASS INDEX: 23.36 KG/M2 | SYSTOLIC BLOOD PRESSURE: 128 MMHG | WEIGHT: 127.7 LBS

## 2021-06-04 NOTE — PROGRESS NOTES
Patient presents to the clinic today for a follow up with Nikunj Lynn MD regarding leg, arm, neck, and headache pain. Patient describes their pain as ache and burning. Her/His function score is N/A.

## 2021-06-04 NOTE — TELEPHONE ENCOUNTER
"RN Triage:     Patient is calling in stating that she cut herself on a soup can 1 hour ago. Cut is 1\" long patient did not answer how deep, she said not deep but did not unwrap the injured area to report the depth. Patient using the correct technique per her report to stop the bleeding and it is still bleeding. Thumb of the left hand was cut while pushing down a tin can in the garbage. She put a liquid bandage on the cut and also applied a gauze dressing. Patient on a blood thinner and INR is 3.7.   Advised ED for evaluation.     Miri Kennedy RN, BSN [facility] Triage Nurse      Reason for Disposition    [1] Bleeding AND [2] won't stop after 10 minutes of direct pressure (using correct technique)    Protocols used: CUTS AND [id number]      "

## 2021-06-04 NOTE — TELEPHONE ENCOUNTER
ANTICOAGULATION  MANAGEMENT    Assessment     Today's INR result of 3.7 is Supratherapeutic (goal INR of 2.0-3.0)        Warfarin taken as previously instructed    No new diet changes affecting INR    No new medication/supplements affecting INR    Continues to tolerate warfarin with no reported s/s of bleeding or thromboembolism     Previous INR was Subtherapeutic after hold    Plan:     Spoke with Sandy regarding INR result and instructed:     Warfarin Dosing Instructions:  hold today then change warfarin dose to 10 mg daily on wed; and 7.5 mg daily rest of week  (4.3 % change)    Instructed patient to follow up no later than: two weeks      Sandy verbalizes understanding and agrees to warfarin dosing plan.    Instructed to call the [facility] for any changes, questions or concerns. (#[phone])   ?   Franchesca Holman RN    Subjective/Objective:      Sandy Spencer, a 79 y.o. female is on warfarin.     Sandy reports:     Home warfarin dose: as updated on anticoagulation calendar per template     Missed doses: No     Medication changes:  No     S/S of bleeding or thromboembolism:  No     New Injury or illness:  No     Changes in diet or alcohol consumption:  No     Upcoming surgery, procedure or cardioversion:  No    Anticoagulation Episode Summary     Current INR goal:   2.0-3.0   TTR:   41.4 % (8.6 mo)   Next INR check:   1/2/2020   INR from last check:   3.70! (12/20/2019)   Weekly max warfarin dose:      Target end date:      INR check location:      Preferred lab:      Send INR reminders to:   [facility]    Indications    Other chronic pulmonary embolism without acute cor pulmonale (H) [I27.82]           Comments:            Anticoagulation Care Providers     Provider Role Specialty Phone number    Caitlyn Rees MD Referring Family Medicine [phone]

## 2021-06-04 NOTE — PROGRESS NOTES
ASSESSMENT/PLAN:       1. Acquired hypothyroidism  -She is doing well on her levothyroxine.  Renewal today, labs are up-to-date.  She will follow-up here as needed for this.  - levothyroxine (LEVO-T) 50 MCG tablet; Take 1 tablet (50 mcg total) by mouth Daily at 6:00 am.  Dispense: 90 tablet; Refill: 3    2. Insomnia, unspecified type  -Doing well with the trazodone, renewal for this placed today, she will follow-up in 6 months.  - traZODone (DESYREL) 100 MG tablet; TAKE 2 TO 4 TABLETS(200  MG) BY MOUTH AT BEDTIME AS NEEDED FOR SLEEP  Dispense: 360 tablet; Refill: 1    3. Anemia, unspecified type  -She continues to be anemic which is likely multifactorial both related to her chronic kidney disease as well as her poor oral intake and perhaps partially related to her prior colon surgeries.  Referral for iron infusions placed for the patient to see if we can get her anemia to improve as she does feel that this is contributing to her fatigue and feeling cold all the time.  - Ambulatory referral to Infusion Therapy    4. Anxiety and depression  -Feels that things are stable at this time.  She will continue on her medication per the pain clinic.    5. Chronic kidney disease (CKD) stage G3a/A1, moderately decreased glomerular filtration rate (GFR) between 45-59 mL/min/1.73 square meter and albuminuria creatinine ratio less than 30 mg/g (H)  -GFR has been fairly stable, she did have some hyponatremia on her last check, updating basic metabolic panel today and referral placed for iron infusions as above.  - Ambulatory referral to Infusion Therapy  - Basic Metabolic Panel    6. Other chronic pulmonary embolism without acute cor pulmonale (H)  - warfarin ANTICOAGULANT (COUMADIN/JANTOVEN) 5 MG tablet; Take one to two tablets (5 to 10 mg) by mouth daily. Adjust dose based on INR results as directed.  Dispense: 180 tablet; Refill: 3  -Tolerating Coumadin with fairly good control of her INR at this time.    7. Splenic  infarction  - warfarin ANTICOAGULANT (COUMADIN/JANTOVEN) 5 MG tablet; Take one to two tablets (5 to 10 mg) by mouth daily. Adjust dose based on INR results as directed.  Dispense: 180 tablet; Refill: 3    8. History of blood clots  - warfarin ANTICOAGULANT (COUMADIN/JANTOVEN) 5 MG tablet; Take one to two tablets (5 to 10 mg) by mouth daily. Adjust dose based on INR results as directed.  Dispense: 180 tablet; Refill: 3    9. Pulmonary embolism (H)  - INR      Return in about 4 weeks (around 1/17/2020) for recheck.      Caitlyn Rees MD      PROGRESS NOTE   12/20/2019    SUBJECTIVE:  Sandy Spencer is a 79 y.o. female  who presents for follow up.     She does feel better since she was last in. She has stopped the tylenol, the iron pills and she is doing better with her nausea. She does wonder about starting some iron infusions.  She feels tired and cold like she did previously when she was anemic and this did improve with blood transfusions.  She is not tolerating oral iron.    She is taking 3-4 trazodone at night to help with sleeping. This is doing well for her.     She does have better stools now. She did have two days of formed stools and then went back to diarrhea. She is so grateful that the nausea is gone. This is the big thing. She went to a [employer] last Saturday and then went to a [employer] and had ham and doesn't do well with this.     She does still havesome headaches, does have some congestion on the left side of her face.     She does have some posterior chest pain on the left again. She does note that this is similar towhat she had going on last year. She does find that she has been sedentary lately due to increased leg pain. She typically will lay down when she is feeling better.   Chief Complaint   Patient presents with     Follow-up     Patient is here today for a three week follow up in regards to the loose stool, kidney disease and anemia. Patient did stop taking the tylenol three weeks  ago, since stoping the tylenol patient's loose stool and nauesa has gotten better. Patient did go in to [facility] on 12/17/19.          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     Temporomandibular joint-pain-dysfunction syndrome (TMJ)     Pancreatitis     Localized swelling of lower leg     Acquired hypothyroidism     Chronic pain syndrome     Snoring     Diarrhea     Abdominal pain, generalized     Dermatochalasis of left eyelid     Bilateral carotid artery stenosis     Coronary artery disease involving native coronary artery of native heart without angina pectoris     Sinus bradycardia     Other chronic pulmonary embolism without acute cor pulmonale (H)     Splenic infarction     Opioid type dependence, continuous (H)     Hematuria     Anemia due to blood loss, acute     Hydronephrosis     Bladder spasms     Hypotension, unspecified hypotension type     Acute reaction to stress     Anxiety disorder due to medical condition     Family relationship problem     History of posttraumatic stress disorder (PTSD)     Generalized muscle weakness     Myofascial pain     Moderate major depression (H)     Elevated lipase     Abdominal bloating     Abdominal distension, gaseous     Acquired absence of other specified parts of digestive tract     Change in bowel habit     Ampullary stenosis     Obstruction of biliary tree     Anemia     Aphthous ulcer of ileum     Ileitis      Bipolar disorder, unspecified (H)     Disorder of phosphorus metabolism     Edema     Gastroesophageal reflux disease without esophagitis     Obstruction of common bile duct     Other and unspecified noninfectious gastroenteritis and colitis     Noninfectious gastroenteritis     Overflow diarrhea     History of partial colectomy     Schizoaffective disorder (H)     Kidney stone     Calculus of kidney     Complex regional pain syndrome       Current Outpatient Medications   Medication Sig Dispense Refill     calcium carb/vitamin D3/vit K1 (VIACTIV ORAL) Take 1 tablet by mouth daily.       cyanocobalamin, vitamin B-12, 5,000 mcg Subl Take 1 tablet by mouth.       diclofenac sodium (VOLTAREN) 1 % Gel Apply 4 g topically 4 (four) times a day. (apply to knees Q AM and Q 2pm and prn.  May self-administer) 100 g 5     fentaNYL (DURAGESIC) 75 mcg/hr Place 1 patch on the skin every other day. 15 patch 0     lamoTRIgine (LAMICTAL) 25 MG tablet One twice a day for two weeks, then two morning and one bedtime 90 tablet 2     levothyroxine (LEVO-T) 50 MCG tablet Take 1 tablet (50 mcg total) by mouth Daily at 6:00 am. 90 tablet 3     MAGNESIUM ORAL Take 400 mg by mouth daily.              methocarbamol (ROBAXIN) 500 MG tablet Take 1 tablet (500 mg total) by mouth 4 (four) times a day. 360 tablet 3     naloxone (NARCAN) 4 mg/actuation nasal spray 1 spray (4 mg dose) into one nostril for opioid reversal. Call 911. May repeat if no response in 3 minutes. 1 Box 0     rosuvastatin (CRESTOR) 20 MG tablet Take 1 tablet (20 mg total) by mouth daily. With one 40 mg tablet to equal 60 mg daily 90 tablet 1     rosuvastatin (CRESTOR) 40 MG tablet Take 1.5 tablets (60 mg total) by mouth at bedtime. 135 tablet 3     SUMAtriptan (IMITREX) 50 MG tablet Take 1 tablet (50 mg total) by mouth every 2 (two) hours as needed for migraine. 27 tablet 1     topiramate (TOPAMAX) 100 MG tablet Take 1 tablet (100 mg total) by mouth 2 (two) times a day. 180  tablet 1     traZODone (DESYREL) 100 MG tablet TAKE 2 TO 4 TABLETS(200  MG) BY MOUTH AT BEDTIME AS NEEDED FOR SLEEP 360 tablet 1     warfarin ANTICOAGULANT (COUMADIN/JANTOVEN) 5 MG tablet Take one to two tablets (5 to 10 mg) by mouth daily. Adjust dose based on INR results as directed. 180 tablet 3     Current Facility-Administered Medications   Medication Dose Route Frequency Provider Last Rate Last Dose     denosumab 60 mg (PROLIA 60 mg/ml)  60 mg Subcutaneous Q6 Months Andrei Olivera MD   60 mg at 10/11/19 1319       Social History     Tobacco Use   Smoking Status Former Smoker     Packs/day: 1.00     Years: 20.00     Pack years: 20.00     Last attempt to quit: 2000     Years since quittin.9   Smokeless Tobacco Never Used           OBJECTIVE:        Recent Results (from the past 240 hour(s))   Basic Metabolic Panel   Result Value Ref Range    Sodium 140 136 - 145 mmol/L    Potassium 3.7 3.5 - 5.0 mmol/L    Chloride 107 98 - 107 mmol/L    CO2 24 22 - 31 mmol/L    Anion Gap, Calculation 9 5 - 18 mmol/L    Glucose 76 70 - 125 mg/dL    Calcium 8.3 (L) 8.5 - 10.5 mg/dL    BUN 24 8 - 28 mg/dL    Creatinine 1.56 (H) 0.60 - 1.10 mg/dL    GFR MDRD Af Amer 39 (L) >60 mL/min/1.73m2    GFR MDRD Non Af Amer 32 (L) >60 mL/min/1.73m2   INR   Result Value Ref Range    INR 3.70 (H) 0.90 - 1.10       Vitals:    19 0958   BP: 112/58   Patient Site: Left Arm   Patient Position: Sitting   Cuff Size: Adult Regular   Pulse: 85   SpO2: 97%   Weight: 123 lb 1.6 oz (55.8 kg)     Weight: 123 lb 1.6 oz (55.8 kg)          Physical Exam:  GENERAL APPEARANCE: A&A, NAD, well hydrated, well nourished  SKIN:  Normal skin turgor, no lesions/rashes   HEENT: moist mucous membranes, no rhinorrhea  NECK: Normal  CV: RRR, no M/G/R   LUNGS: CTAB, no wheezing, tenderness to palpation over the posterior chest wall in the left lower ribs  ABDOMEN: S&NT, no masses   EXTREMITY: no edema   NEURO: no gross deficits   Psych: Her  affect is stable, she appears less anxious than she has previously, she is well-dressed and groomed, her thought process and speech pattern are normal

## 2021-06-04 NOTE — TELEPHONE ENCOUNTER
Per patient, she wanted Dr. Rees to see these results right away.    Results are in Dr. Rees's inbox.    12/24/19

## 2021-06-04 NOTE — TELEPHONE ENCOUNTER
Called and left a detailed voicemail for the patient informing her that Dr. Rees had reviewed the results from [facility] and after reviewing the results, we should continue on with what we are doing medically wise. There is no need to change anything.    Paperwork has been sent to be scanned into the patient's chart.

## 2021-06-04 NOTE — TELEPHONE ENCOUNTER
Reviewed and sent for scanning,     I don't have anything to change with this. Thanks for the information

## 2021-06-04 NOTE — TELEPHONE ENCOUNTER
Left a voicemail for the patient asking for her to call back to review lab results. Please relay Dr. Rees's message to the patient about lab results when she calls back. See phone note.

## 2021-06-04 NOTE — PROGRESS NOTES
Pt was a very difficult iv start and took 5 trys by 3 different nurses. Pt given educational materials and tolerated infusion well. Discharged home. Amarilys Ponce RN

## 2021-06-04 NOTE — TELEPHONE ENCOUNTER
ANTICOAGULATION  MANAGEMENT    Assessment     Today's INR result of 1.6 is Subtherapeutic (goal INR of 2.0-3.0)        Warfarin taken as previously instructed    No new diet changes affecting INR    No new medication/supplements affecting INR    Continues to tolerate warfarin with no reported s/s of bleeding or thromboembolism     Previous INR was Supratherapeutic, dose was lowered 5%    Plan:     Spoke with Sandy regarding INR result and instructed:     Warfarin Dosing Instructions:  one time booster of 12.5mg tonight then change warfarin dose to 10 mg daily on Wed/Sat; and 7.5 mg daily rest of week  (5 % change, increased back to previous dose)    Instructed patient to follow up no later than: two weeks    Education provided: importance of therapeutic range and target INR goal and significance of current INR result    Sandy verbalizes understanding and agrees to warfarin dosing plan.    Instructed to call the [facility] for any changes, questions or concerns. (#[phone])   ?   Jayla Mcnulty RN    Subjective/Objective:      Sandy ELSIE Spencer, a 79 y.o. female is on warfarin.     Sandy reports:     Home warfarin dose: as updated on anticoagulation calendar per template     Missed doses: No     Medication changes:  No     S/S of bleeding or thromboembolism:  No     New Injury or illness:  No     Changes in diet or alcohol consumption:  No     Upcoming surgery, procedure or cardioversion:  No    Anticoagulation Episode Summary     Current INR goal:   2.0-3.0   TTR:   41.7 % (9.1 mo)   Next INR check:   1/17/2020   INR from last check:   1.60! (1/2/2020)   Weekly max warfarin dose:      Target end date:      INR check location:      Preferred lab:      Send INR reminders to:   [facility]    Indications    Other chronic pulmonary embolism without acute cor pulmonale (H) [I27.82]           Comments:            Anticoagulation Care Providers     Provider Role Specialty Phone number    [place]  Caitlyn King MD [facility] [phone]

## 2021-06-04 NOTE — TELEPHONE ENCOUNTER
Who is calling:  patient  Reason for Call:  Patient had and INR scheduled for 12/17/19 at 1:15pm and cancelled it due to it conflicting with another appointment. She is scheduled to see Dr Rees on 12/20/19 at 10:00am, is it okay for her to wait until Friday to have her INR checked?  Date of last appointment with primary care: 11/26/19  Okay to leave a detailed message: Yes

## 2021-06-04 NOTE — TELEPHONE ENCOUNTER
Patient Returning Call  Reason for call:  Return call   Information relayed to patient:  Caller was informed of message below; Call patient to notify her that her labs look better than last time from an electrolyte perspective. Her kidneys look ok but are irritated. Continue to make sure that she is getting enough fluids.  Patient has additional questions:  No  If YES, what are your questions/concerns:  n/a  Okay to leave a detailed message?: No call back needed

## 2021-06-04 NOTE — TELEPHONE ENCOUNTER
Spoke with Sandy and verified that INR can be drawn on 12/20 after OV since patient is not able to come in sooner.

## 2021-06-04 NOTE — TELEPHONE ENCOUNTER
----- Message from Caitlyn Rees MD sent at 12/24/2019  7:29 AM CST -----  Call patient to notify her that her labs look better than last time from an electrolyte perspective. Her kidneys look ok but are irritated. Continue to make sure that she is getting enough fluids.

## 2021-06-04 NOTE — PROGRESS NOTES
[facility] Daily Progress     Patient Name: Sandy Spencer  Date: 12/5/2019  Visit #: 9/12 +12       Referral Diagnosis: Abdominal Pain   HA;s Neck Back Pain  Referring provider: Michael Ramirez DO  Visit Diagnosis: R LE pain, R Knee Pain,  Abdominal pain    ICD-10-CM    1. Complex regional pain syndrome type 1 of both lower extremities G90.523    2. Chronic pain syndrome G89.4    3. Myofascial pain M79.18    4. Complex regional pain syndrome type 1 of right upper extremity G90.511    5. 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 Arthralgia of both lower legs M25.561     M25.562    7. Abdominal pain, generalized R10.84          Assessment:     Results of Treatment   Abdominal pain reduced 0/10    R LE RSD pain reduced 6-7/10 to 3-4/10.   Visceral motility to Ps remaining colon and illium  Normalized.  Pt to observe pain and hearing issues      Patient is benefitting from skilled physical therapy and is making steady progress toward functional goals.  Patient is appropriate to continue with skilled physical therapy intervention, as indicated by initial plan of care.      Treatment Results    Goal Status:  No data recorded  No data recorded    Plan / Patient Education:     Plan to con't with manual therapy to decrease fascial tension/tone to normalize ROM/decrease inflammation/decrease mm tone and improve proprioception.      Subjective:     Pain levels    Abdominal pain  2-3/10  No HA  Today,   LBP 4-5/10  Difficulty hearing  Getting hearing aides  LE Pain  RSD?  Objective:   + Scan  Ant Neuro                 Treatment Today   12/5/2019   TREATMENT MINUTES COMMENTS   Evaluation     Self-care/ Home management          Manual therapyS 60 MFR with OA, L5-SI and SI joint releases, Dural Tube   Temporal bone release,   SCS   LML-MS bilat.  SCS to BPA-N,   R DS-A,  SCS to VCEC-N,   VILM-N,   SCS to   R MGAS-N,   LGAS-N,   TIB-N,   PARTH-N,   DFIB-N,   CFIB-N,   DSCI-N,  SCS to EVAN-N,    SMES-N,   MLGY-XSCTC-C,          Neuromuscular Re-education     Therapeutic Activity     herapeutic Exercises     Gait training     Modality__________________                Total 60    Blank areas are intentional and mean the treatment did not include these items.       Yogi Velazquez  12/5/2019

## 2021-06-04 NOTE — PROGRESS NOTES
Assessment/Plan:     Problem List Items Addressed This Visit     Lumbar radiculopathy    Relevant Medications    fentaNYL (DURAGESIC) 75 mcg/hr (Start on 12/28/2019)            No follow-ups on file.    Patient Instructions   PLAN:     Resume the PEA  One capsule twice a day      May take the hemp CBD preparation, start with lower dose than recommended and increase slowly as tolerated    At check-out get on schedule with Brinda    Continue with Fentanyl 75 mcg every other day    Return in 8 weeks        Subjective:       79 y.o. female presents for management of pain generators include lumbar degenerative changes, lower extremity chronic regional pain syndrome, migraine headaches.    She reviews she has had her second iron infusion.  She can feel achy and some constipation, noting as much and much of her colon removed.  Is using lactulose.  Describes of the oral iron she just did not tolerate that well.    She has been able to gain 10 pounds.  She describes apart from stopping Tylenol other agents.    She reviews seeing her family more over the holidays which is been helpful.    Reviews concerns for a son-in-law who is having significant medical problems.    She produces some medication she has not been taking recently wonders if she needed to.  Once was oxcarbazepine, we were using it to try to help with chronic regional pain syndrome type symptoms.  This appears to be less of an issue.  Another was the PEA.  She states she did not give that long enough trial.    She has a family member who works in a hemp farm producing CBD and wonders about using this.  We discussed it is not consistent among all products with concentration of CBD and presence of THC, though would be relatively safe for her to try at low doses and going slowly.     is reviewed, continues on the fentanyl 75 mcg patch.      Current Outpatient Medications:      calcium carb/vitamin D3/vit K1 (VIACTIV ORAL), Take 1 tablet by mouth daily., Disp: ,  Rfl:      cyanocobalamin, vitamin B-12, 5,000 mcg Subl, Take 1 tablet by mouth., Disp: , Rfl:      diclofenac sodium (VOLTAREN) 1 % Gel, Apply 4 g topically 4 (four) times a day. (apply to knees Q AM and Q 2pm and prn.  May self-administer), Disp: 100 g, Rfl: 5     lamoTRIgine (LAMICTAL) 25 MG tablet, One twice a day for two weeks, then two morning and one bedtime, Disp: 90 tablet, Rfl: 2     levothyroxine (LEVO-T) 50 MCG tablet, Take 1 tablet (50 mcg total) by mouth Daily at 6:00 am., Disp: 90 tablet, Rfl: 3     MAGNESIUM ORAL, Take 400 mg by mouth daily.    , Disp: , Rfl:      methocarbamol (ROBAXIN) 500 MG tablet, Take 1 tablet (500 mg total) by mouth 4 (four) times a day., Disp: 360 tablet, Rfl: 3     naloxone (NARCAN) 4 mg/actuation nasal spray, 1 spray (4 mg dose) into one nostril for opioid reversal. Call 911. May repeat if no response in 3 minutes., Disp: 1 Box, Rfl: 0     rosuvastatin (CRESTOR) 20 MG tablet, Take 1 tablet (20 mg total) by mouth daily. With one 40 mg tablet to equal 60 mg daily, Disp: 90 tablet, Rfl: 1     rosuvastatin (CRESTOR) 40 MG tablet, Take 1.5 tablets (60 mg total) by mouth at bedtime., Disp: 135 tablet, Rfl: 3     SUMAtriptan (IMITREX) 50 MG tablet, Take 1 tablet (50 mg total) by mouth every 2 (two) hours as needed for migraine., Disp: 27 tablet, Rfl: 1     topiramate (TOPAMAX) 100 MG tablet, Take 1 tablet (100 mg total) by mouth 2 (two) times a day., Disp: 180 tablet, Rfl: 1     traZODone (DESYREL) 100 MG tablet, TAKE 2 TO 4 TABLETS(200  MG) BY MOUTH AT BEDTIME AS NEEDED FOR SLEEP, Disp: 360 tablet, Rfl: 1     warfarin ANTICOAGULANT (COUMADIN/JANTOVEN) 5 MG tablet, Take one to two tablets (5 to 10 mg) by mouth daily. Adjust dose based on INR results as directed., Disp: 180 tablet, Rfl: 3     [START ON 12/28/2019] fentaNYL (DURAGESIC) 75 mcg/hr, Place 1 patch on the skin every other day., Disp: 15 patch, Rfl: 0    Current Facility-Administered Medications:      denosumab 60 mg  "(PROLIA 60 mg/ml), 60 mg, Subcutaneous, Q6 Months, Andrei Olivera MD, 60 mg at 10/11/19 1319  She is alert with a clear sensorium good eye contact.  She appears much more comfortable with last seen.  Less ill, less anxious.  Affect is wider range.  Appropriately groomed.         Objective:     Vitals:    12/27/19 1101   BP: 122/73   Pulse: 99   Resp: 16   Weight: 125 lb 1.6 oz (56.7 kg)   Height: 5' 2\" (1.575 m)   PainSc:   4         Time spent more than 15 minutes face-to-face, 50% counseling about above condition and coordination of treatment plan          This note has been dictated using voice recognition software. Any grammatical or context distortions are unintentional and inherent to the software  "

## 2021-06-04 NOTE — PATIENT INSTRUCTIONS - HE
PLAN:     Resume the PEA  One capsule twice a day      May take the hemp CBD preparation, start with lower dose than recommended and increase slowly as tolerated    At check-out get on schedule with Brinda    Continue with Fentanyl 75 mcg every other day    Return in 8 weeks

## 2021-06-05 VITALS
OXYGEN SATURATION: 97 % | SYSTOLIC BLOOD PRESSURE: 130 MMHG | HEART RATE: 90 BPM | BODY MASS INDEX: 24.71 KG/M2 | HEIGHT: 63 IN | WEIGHT: 139.44 LBS | DIASTOLIC BLOOD PRESSURE: 72 MMHG | RESPIRATION RATE: 18 BRPM

## 2021-06-05 VITALS — HEIGHT: 63 IN | WEIGHT: 144 LBS | BODY MASS INDEX: 25.52 KG/M2

## 2021-06-05 VITALS
DIASTOLIC BLOOD PRESSURE: 80 MMHG | RESPIRATION RATE: 18 BRPM | HEIGHT: 62 IN | TEMPERATURE: 99.2 F | HEART RATE: 87 BPM | BODY MASS INDEX: 25.82 KG/M2 | SYSTOLIC BLOOD PRESSURE: 150 MMHG | WEIGHT: 140.31 LBS | OXYGEN SATURATION: 98 %

## 2021-06-05 VITALS — BODY MASS INDEX: 25.03 KG/M2 | HEIGHT: 63 IN

## 2021-06-05 VITALS
SYSTOLIC BLOOD PRESSURE: 184 MMHG | WEIGHT: 142 LBS | OXYGEN SATURATION: 99 % | BODY MASS INDEX: 25.15 KG/M2 | DIASTOLIC BLOOD PRESSURE: 80 MMHG | HEART RATE: 70 BPM

## 2021-06-05 VITALS
HEIGHT: 62 IN | DIASTOLIC BLOOD PRESSURE: 70 MMHG | SYSTOLIC BLOOD PRESSURE: 120 MMHG | BODY MASS INDEX: 26.13 KG/M2 | HEART RATE: 76 BPM | WEIGHT: 142 LBS | RESPIRATION RATE: 14 BRPM

## 2021-06-05 VITALS
BODY MASS INDEX: 25.19 KG/M2 | SYSTOLIC BLOOD PRESSURE: 120 MMHG | WEIGHT: 142.2 LBS | DIASTOLIC BLOOD PRESSURE: 68 MMHG | OXYGEN SATURATION: 95 % | HEART RATE: 84 BPM

## 2021-06-05 VITALS
SYSTOLIC BLOOD PRESSURE: 136 MMHG | BODY MASS INDEX: 25.03 KG/M2 | DIASTOLIC BLOOD PRESSURE: 68 MMHG | OXYGEN SATURATION: 98 % | HEART RATE: 83 BPM | WEIGHT: 141.3 LBS

## 2021-06-05 VITALS
OXYGEN SATURATION: 98 % | DIASTOLIC BLOOD PRESSURE: 62 MMHG | WEIGHT: 141.3 LBS | BODY MASS INDEX: 25.84 KG/M2 | HEART RATE: 88 BPM | SYSTOLIC BLOOD PRESSURE: 124 MMHG

## 2021-06-05 VITALS — HEIGHT: 62 IN | BODY MASS INDEX: 25.84 KG/M2

## 2021-06-05 VITALS — BODY MASS INDEX: 25.16 KG/M2 | WEIGHT: 142 LBS | HEIGHT: 63 IN

## 2021-06-05 NOTE — PROGRESS NOTES
Assessment:        1. Arthralgia of multiple joints  HM1(CBC and Differential)    HM1 (CBC with Diff)   2. Essential hypertension  Basic Metabolic Panel   3. Herpes zoster with complication  valACYclovir (VALTREX) 1000 MG tablet   4. Hyperglycemia  Glycosylated Hemoglobin A1c            Plan:          We reviewed the likely/potential etiology(ies) for her jopint symptoms and we will check labs as noted to rule out acute infection. I am suspicious that some of her symptoms could be from stopping her psychiatric medications abruptly, then resuming them. I suggested that she back off a bit on the topamax and work up to her previous dose. We reviewed use of OTC analgesics as well as increased fluids and rest, and she will call or return to clinic with any ongoing or worsening symptoms. She will otherwise will f/u with Dr Rees in 1-2 mos for routine med check/evaluation.       Subjective:           Sandy Spencer is an 79 y.o. female who presents with arthralgias that began a few weeks ago. Pain is located in the arms, legs and ankles. The pain is described as constant, aching. She reports that she had been losing weight, so stopped her lamotrigine and topamax for a few mos, then resumed them at lamotrigine 50 mg am and 25 mg hs, and topamax at 50 mg two times a day. She also notes some burning pain in legs, L>R without rash. The pain feels similar to shingles infections in the past.         She also reports night sweats and frequent chills. She reports nasal congestion and phlegm in throat chronically. She denies dysuria. She did have iron infusions recently.            Patient is also here for follow-up of elevated blood pressure. Associated signs and symptoms: dyspnea and tiredness/fatigue. Patient denies exertional chest pressure/discomfort, lower extremity edema, near-syncope and palpitations. The patient exercises intermittently. Weight trend: fluctuating a bit.          The following portions of the patient's  history were reviewed and updated as appropriate: allergies, current medications, past family history, past medical history, past social history, past surgical history and problem list.    Review of Systems  Pertinent items are noted in HPI.      Objective:     Vitals:    02/06/20 1407   BP: 92/60   Patient Site: Right Arm   Patient Position: Sitting   Cuff Size: Adult Regular   Pulse: 80   Weight: 125 lb 9.6 oz (57 kg)      Physical Exam:  GEN: Alert and oriented, NAD,  well nourished  SKIN:  Normal skin turgor, no lesions/rashes   HEENT: moist mucous membranes, no rhinorrhea.    NECK: Normal.  No adenopathy or thyromegaly.  CV: Regular rate and rhythm, no murmurs.   LUNGS: Clear to auscultation bilaterally.    ABDOMEN: Soft, non-tender, non-distended, no masses   EXTREMITY: No edema, cyanosis  NEURO: Grossly normal.             Results for orders placed or performed in visit on 02/06/20   Basic Metabolic Panel   Result Value Ref Range    Sodium 138 136 - 145 mmol/L    Potassium 3.6 3.5 - 5.0 mmol/L    Chloride 103 98 - 107 mmol/L    CO2 24 22 - 31 mmol/L    Anion Gap, Calculation 11 5 - 18 mmol/L    Glucose 93 70 - 125 mg/dL    Calcium 8.6 8.5 - 10.5 mg/dL    BUN 22 8 - 28 mg/dL    Creatinine 1.53 (H) 0.60 - 1.10 mg/dL    GFR MDRD Af Amer 40 (L) >60 mL/min/1.73m2    GFR MDRD Non Af Amer 33 (L) >60 mL/min/1.73m2   HM1 (CBC with Diff)   Result Value Ref Range    WBC 5.3 4.0 - 11.0 thou/uL    RBC 3.77 (L) 3.80 - 5.40 mill/uL    Hemoglobin 12.4 12.0 - 16.0 g/dL    Hematocrit 36.7 35.0 - 47.0 %    MCV 97 80 - 100 fL    MCH 32.9 27.0 - 34.0 pg    MCHC 33.8 32.0 - 36.0 g/dL    RDW 11.8 11.0 - 14.5 %    Platelets 179 140 - 440 thou/uL    MPV 7.3 7.0 - 10.0 fL    Neutrophils % 62 50 - 70 %    Lymphocytes % 24 20 - 40 %    Monocytes % 9 2 - 10 %    Eosinophils % 4 0 - 6 %    Basophils % 0 0 - 2 %    Neutrophils Absolute 3.3 2.0 - 7.7 thou/uL    Lymphocytes Absolute 1.3 0.8 - 4.4 thou/uL    Monocytes Absolute 0.5 0.0 - 0.9  thou/uL    Eosinophils Absolute 0.2 0.0 - 0.4 thou/uL    Basophils Absolute 0.0 0.0 - 0.2 thou/uL   Glycosylated Hemoglobin A1c   Result Value Ref Range    Hemoglobin A1c 4.9 3.5 - 6.0 %     *Note: Due to a large number of results and/or encounters for the requested time period, some results have not been displayed. A complete set of results can be found in Results Review.

## 2021-06-05 NOTE — PROGRESS NOTES
Pt came into infusion clinic for her IV Iron as ordered. Medications explained to pt who verbalized understanding. IV patent throughout IVP. Pt tolerated infusion with no complications. Pt monitored post infusion with no issues. Pt left infusion clinic via ambulatory and will RTC as sched.

## 2021-06-05 NOTE — PROGRESS NOTES
ASSESSMENT/PLAN:       1. Constipation, unspecified constipation type  -Unfortunately the patient is now quite constipated rather than having diarrhea which she was having previously.  We discussed that she should add in MiraLAX 1 capful daily until her bowels start moving and then she can start backing off to 1/2-1 capful daily just to keep things moving.  If this is not improving or if her stools do not stop becoming bloody she should let me know and we can refer back to GI.  She may try an enema this weekend as well.    2. Moderate major depression (H)  -Her mood is better with the fact that she restarted her lamotrigine.  She does still feel at times a little bit emotionally labile however and is wondering if she needs to go up on this.  We discussed that as this is coming from her pain physician she should discuss this with him but certainly it looks like dosing would be reasonable to increase.  Thankfully she is feeling much better since restarting it.    3. Chronic cluster headache, not intractable  -Headaches have gotten worse lately as well, she did need to take an Imitrex today.  Going to have her restart her Topamax at 50 mg orally daily for 1 week and then to twice daily until she comes back in to see me in approximately 1 month.    4. Pulmonary embolism (H)  - INR      Return in about 4 weeks (around 2/28/2020) for recheck.      Caitlyn Rees MD      PROGRESS NOTE   1/31/2020    SUBJECTIVE:  Sandy Spencer is a 79 y.o. female  who presents for follow up.     She is very constipated. She does have bleeding with bowel movements as well. She does wonder if doing a fleets enema. She has been doing smooth move tea. She did take Lactulose as well and that does help but is difficult for her to take as it is not palatable. She is not using Miralax right now. She does have blood coming from her bowel movements.  She evidently was so backed up at one point she used a spoon to take some of the stool  out of her bottom.  She is using Tucks and no she has hemorrhoids and this is helping some.    She does know that she needs to control her mental health more and her headaches more. She does feel better on her Lamotrigine. She is taking 2 in the morning and one at night.  She does wonder if she could go up on her lamotrigine.  This has been originally prescribed by her pain physician.    She has been having more headaches as well.  She is a longstanding history of migraines and cluster headaches and they have been worse lately.  Again please see my last note but in short she had discontinued all of her medications when she was feeling unwell.  She does wonder if she can restart her Topamax now.  Chief Complaint   Patient presents with     Depression     Patient is here today for a two week follow up since restarting the Lamotrigine.      Constipation     Patient feels that over the past two weeks she has become more constipated. Patient is having rectal bleeding after having a BM. Patient is drinking smooth move tea.          Patient Active Problem List   Diagnosis     Chronic kidney disease (CKD) stage G3a/A1, moderately decreased glomerular filtration rate (GFR) between 45-59 mL/min/1.73 square meter and albuminuria creatinine ratio less than 30 mg/g (H)     Focal glomerular sclerosis     RSD lower limb, bilateral     ADD (attention deficit disorder)     RSD upper limb, right     Osteopenia     Major depression     Hypertension     Insomnia     Mixed hyperlipidemia     Right rotator cuff tear     Cluster headaches     Lumbar radiculopathy     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     Temporomandibular joint-pain-dysfunction syndrome (TMJ)     Localized swelling of lower leg     Acquired hypothyroidism     Chronic pain syndrome     Snoring     Abdominal pain, generalized     Dermatochalasis of left eyelid     Bilateral carotid artery stenosis     Coronary artery disease involving native coronary artery of  native heart without angina pectoris     Sinus bradycardia     Other chronic pulmonary embolism without acute cor pulmonale (H)     Splenic infarction     Opioid type dependence, continuous (H)     Hematuria     Hydronephrosis     Bladder spasms     Anxiety disorder due to medical condition     Family relationship problem     History of posttraumatic stress disorder (PTSD)     Generalized muscle weakness     Myofascial pain     Moderate major depression (H)     Elevated lipase     Abdominal bloating     Acquired absence of other specified parts of digestive tract     Ampullary stenosis     Obstruction of biliary tree     Anemia     Aphthous ulcer of ileum     Bipolar disorder, unspecified (H)     Disorder of phosphorus metabolism     Edema     Gastroesophageal reflux disease without esophagitis     Obstruction of common bile duct     Overflow diarrhea     History of partial colectomy     Schizoaffective disorder (H)     Complex regional pain syndrome       Current Outpatient Medications   Medication Sig Dispense Refill     calcium carb/vitamin D3/vit K1 (VIACTIV ORAL) Take 1 tablet by mouth daily.       cyanocobalamin, vitamin B-12, 5,000 mcg Subl Take 1 tablet by mouth.       diclofenac sodium (VOLTAREN) 1 % Gel Apply 4 g topically 4 (four) times a day. (apply to knees Q AM and Q 2pm and prn.  May self-administer) 100 g 5     fentaNYL (DURAGESIC) 75 mcg/hr Place 1 patch on the skin every other day. 15 patch 0     lactulose (ENULOSE) 10 gram/15 mL (15 mL) Take 20 g by mouth.       lamoTRIgine (LAMICTAL) 25 MG tablet Take 50 mg in the AM and 25 mg at HS 90 tablet 2     levothyroxine (LEVO-T) 50 MCG tablet Take 1 tablet (50 mcg total) by mouth Daily at 6:00 am. 90 tablet 3     MAGNESIUM ORAL Take 400 mg by mouth daily.              methocarbamol (ROBAXIN) 500 MG tablet Take 1 tablet (500 mg total) by mouth 4 (four) times a day. 360 tablet 3     naloxone (NARCAN) 4 mg/actuation nasal spray 1 spray (4 mg dose) into one  nostril for opioid reversal. Call 911. May repeat if no response in 3 minutes. 1 Box 0     rosuvastatin (CRESTOR) 20 MG tablet Take 1 tablet (20 mg total) by mouth daily. With one 40 mg tablet to equal 60 mg daily 90 tablet 1     rosuvastatin (CRESTOR) 40 MG tablet Take 1.5 tablets (60 mg total) by mouth at bedtime. 135 tablet 3     SUMAtriptan (IMITREX) 50 MG tablet Take 1 tablet (50 mg total) by mouth every 2 (two) hours as needed for migraine. 27 tablet 1     topiramate (TOPAMAX) 100 MG tablet Take 1 tablet (100 mg total) by mouth 2 (two) times a day. 180 tablet 1     traZODone (DESYREL) 100 MG tablet TAKE 2 TO 4 TABLETS(200  MG) BY MOUTH AT BEDTIME AS NEEDED FOR SLEEP 360 tablet 1     warfarin ANTICOAGULANT (COUMADIN/JANTOVEN) 5 MG tablet Take one to two tablets (5 to 10 mg) by mouth daily. Adjust dose based on INR results as directed. 180 tablet 3     Current Facility-Administered Medications   Medication Dose Route Frequency Provider Last Rate Last Dose     denosumab 60 mg (PROLIA 60 mg/ml)  60 mg Subcutaneous Q6 Months Andrei Olivera MD   60 mg at 10/11/19 1319       Social History     Tobacco Use   Smoking Status Former Smoker     Packs/day: 1.00     Years: 20.00     Pack years: 20.00     Last attempt to quit: 2000     Years since quittin.0   Smokeless Tobacco Never Used           OBJECTIVE:        Recent Results (from the past 240 hour(s))   INR   Result Value Ref Range    INR 2.00 (H) 0.90 - 1.10       Vitals:    20 1410   BP: 128/68   Patient Site: Right Arm   Patient Position: Sitting   Cuff Size: Adult Regular   Pulse: 78   SpO2: 98%   Weight: 127 lb 11.2 oz (57.9 kg)     Weight: 127 lb 11.2 oz (57.9 kg)          Physical Exam:  GENERAL APPEARANCE: A&A, NAD, well hydrated, well nourished  SKIN:  Normal skin turgor, no lesions/rashes   HEENT: moist mucous membranes, no rhinorrhea  NECK: Normal  CV: RRR, no M/G/R   LUNGS: CTAB  Rectal exam deferred today as the patient was  feeling relatively well  EXTREMITY: no edema   NEURO: no gross deficits   Psych: Her affect is stable, she is casually dressed and groomed, her thought process and speech pattern are normal, she is much less tearful today than she has been over the last several visits

## 2021-06-05 NOTE — TELEPHONE ENCOUNTER
ANTICOAGULATION  MANAGEMENT    Assessment     Today's INR result of 2.0 is Therapeutic (goal INR of 2.0-3.0)        Patient is unsure if she missed a dose on Wednesday or not    No new diet changes affecting INR    No new medication/supplements affecting INR    Continues to tolerate warfarin with no reported s/s of bleeding or thromboembolism     Previous INR was Subtherapeutic    Plan:     Spoke with Sandy regarding INR result and instructed:     Warfarin Dosing Instructions:  Continue current warfarin dose 10 mg daily on Mon/Wed/Sat; and 7.5 mg daily rest of week  (0 % change)    Instructed patient to follow up no later than: two weeks    Education provided: importance of therapeutic range    Sandy verbalizes understanding and agrees to warfarin dosing plan.    Instructed to call the [facility] for any changes, questions or concerns. (#[phone])   ?   Jyala Mcnulty RN    Subjective/Objective:      Sandy ELSIE Spencer, a 79 y.o. female is on warfarin.     Sandy reports:     Home warfarin dose: verbally confirmed home dose with Sandy and updated on anticoagulation calendar     Missed doses: Unsure     Medication changes:  No     S/S of bleeding or thromboembolism:  No     New Injury or illness:  No     Changes in diet or alcohol consumption:  No     Upcoming surgery, procedure or cardioversion:  No    Anticoagulation Episode Summary     Current INR goal:   2.0-3.0   TTR:   37.7 % (10 mo)   Next INR check:   2/14/2020   INR from last check:   2.00 (1/31/2020)   Weekly max warfarin dose:      Target end date:      INR check location:      Preferred lab:      Send INR reminders to:   [facility]    Indications    Other chronic pulmonary embolism without acute cor pulmonale (H) [I27.82]           Comments:            Anticoagulation Care Providers     Provider Role Specialty Phone number    Caitlyn Rees MD Referring Family Medicine [phone]

## 2021-06-05 NOTE — PATIENT INSTRUCTIONS - HE
Please restart the lamotrigine: 1 pill two times a day for two weeks, then 2 pills in the morning and one pill in the evening.     This can stabilize mood and help with the headaches and sleeping.

## 2021-06-05 NOTE — PATIENT INSTRUCTIONS - HE
Please start taking your Topamax/topiramate again, at 50 mg (1/2 pill) daily for one week. If doing well from a side effect perspective then increase to 50 mg two times a day.     Discuss with Dr. Lynn if going up to two pills two times a day of the lamotrigine would be ok.     Please start taking Miralax, 17 g, one capful daily until your bowels are moving, and then you can back off to 1/2-1 capful per day to keep things moving.

## 2021-06-05 NOTE — PATIENT INSTRUCTIONS - HE
decrease topamax to 25 mg two times a day for 1-2 wks, then increase to 50 mg two times a day as tolerated.

## 2021-06-05 NOTE — PROGRESS NOTES
ASSESSMENT/PLAN:       1. Moderate episode of recurrent major depressive disorder (H)  -Longstanding issues, I do think she would benefit from seeing a therapist.  We will need to consider psychiatrist as well as her mental health is difficult for me to treat medically given her significant allergies as well as her questionable history of bipolar disorder.  Historically she has talked about her mental health prescriptions with her pain clinic physician but is finding it to be more difficult to get into see him for follow-up.  30 minutes was spent face-to-face with the patient during this encounter and >50% of this was spent on counseling and coordination of care in relation to her mental health. We discussed in depth the topics listed above.   -She will restart her lamotrigine as well which can help with her headaches as well as possibly with giving her more benefit from a mood stabilization perspective.  - Ambulatory referral to Psychology    2. Family relationship problem  -Referral for mental health to talk about ways to manage her stress related to her family issues.  - Ambulatory referral to Psychology    3. Chronic kidney disease (CKD) stage G3a/A1, moderately decreased glomerular filtration rate (GFR) between 45-59 mL/min/1.73 square meter and albuminuria creatinine ratio less than 30 mg/g (H)  -Longstanding, updating her CMP today.  - Comprehensive Metabolic Panel    4. Anemia, unspecified type  -Doing well, likely secondary to her chronic kidney disease, updating labs today.  - Ferritin  - Iron and Transferrin Iron Binding Capacity  - HM1(CBC and Differential)  - HM1 (CBC with Diff)    5. Elevated lipase  -Has been intermittently elevated in the past, updating lipase today  - Lipase    6. Pulmonary embolism (H)  -Tolerating warfarin therapy.  - INR      Return in about 2 weeks (around 1/31/2020).      Caitlyn Rees MD      PROGRESS NOTE   1/17/2020    SUBJECTIVE:  Sandy Spencer is a 79 y.o.  female  who presents for follow up.     She is not sleeping well. related to her friend's death. She is not sleeping well which is causing a headache.    She does find that her mood has gotten a lot worse as well in relation to feeling desperate.  She does feel very frustrated by her pain and it feels like she does not have the stamina to live with the pain any longer.  In addition to this she finds that her familial stress is increased again, her daughter who she had been getting along with somewhat has become more confrontational again.  This is very frustrating to her.  She does wonder about her mental health in general, she does worry that her daughter is trying to make her look mentally unfit.    She does wonder about gene testing.  She did some through a family member and is wondering if she would benefit from genetic testing to see how she would respond to medications as she is on so many and has not tolerated medications in the past.    She does have a headache today as well.  She had discontinued her lamotrigine which was prescribed to her by the pain clinic as she was tired of taking medicines.  Chief Complaint   Patient presents with     Follow-up     Patient is here today for a 4 week follow up on her insomnia, anemia, mental health, kidney disease and PE. Patient did lose a friend to cancer a week ago and since has found that her sleeping has gotten worse, only sleeping 2 to 3 hours a night.      Lab Work     Patient would like to have lab work done today. Patient also wanting to review lab results through [facility], they recommended to have a PEX testing completed.          Patient Active Problem List   Diagnosis     Chronic kidney disease (CKD) stage G3a/A1, moderately decreased glomerular filtration rate (GFR) between 45-59 mL/min/1.73 square meter and albuminuria creatinine ratio less than 30 mg/g (H)     Focal glomerular sclerosis     Anxiety and depression     RSD lower limb,  bilateral     ADD (attention deficit disorder)     RSD upper limb, right     Osteopenia     Major depression     Hypertension     Insomnia     Mixed hyperlipidemia     Right rotator cuff tear     Cluster headaches     Lumbar radiculopathy     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     Temporomandibular joint-pain-dysfunction syndrome (TMJ)     Pancreatitis     Localized swelling of lower leg     Acquired hypothyroidism     Chronic pain syndrome     Snoring     Diarrhea     Abdominal pain, generalized     Dermatochalasis of left eyelid     Bilateral carotid artery stenosis     Coronary artery disease involving native coronary artery of native heart without angina pectoris     Sinus bradycardia     Other chronic pulmonary embolism without acute cor pulmonale (H)     Splenic infarction     Opioid type dependence, continuous (H)     Hematuria     Anemia due to blood loss, acute     Hydronephrosis     Bladder spasms     Acute reaction to stress     Anxiety disorder due to medical condition     Family relationship problem     History of posttraumatic stress disorder (PTSD)     Generalized muscle weakness     Myofascial pain     Moderate major depression (H)     Elevated lipase     Abdominal bloating     Acquired absence of other specified parts of digestive tract     Ampullary stenosis     Obstruction of biliary tree     Anemia     Aphthous ulcer of ileum     Bipolar disorder, unspecified (H)     Disorder of phosphorus metabolism     Edema     Gastroesophageal reflux disease without esophagitis     Obstruction of common bile duct     Other and unspecified noninfectious gastroenteritis and colitis     Noninfectious gastroenteritis     Overflow diarrhea     History of partial colectomy     Schizoaffective disorder (H)     Kidney stone     Calculus of kidney     Complex regional pain syndrome       Current Outpatient Medications   Medication Sig Dispense Refill     calcium carb/vitamin D3/vit K1 (VIACTIV ORAL) Take  1 tablet by mouth daily.       cyanocobalamin, vitamin B-12, 5,000 mcg Subl Take 1 tablet by mouth.       diclofenac sodium (VOLTAREN) 1 % Gel Apply 4 g topically 4 (four) times a day. (apply to knees Q AM and Q 2pm and prn.  May self-administer) 100 g 5     fentaNYL (DURAGESIC) 75 mcg/hr Place 1 patch on the skin every other day. 15 patch 0     lamoTRIgine (LAMICTAL) 25 MG tablet One twice a day for two weeks, then two morning and one bedtime 90 tablet 2     levothyroxine (LEVO-T) 50 MCG tablet Take 1 tablet (50 mcg total) by mouth Daily at 6:00 am. 90 tablet 3     MAGNESIUM ORAL Take 400 mg by mouth daily.              methocarbamol (ROBAXIN) 500 MG tablet Take 1 tablet (500 mg total) by mouth 4 (four) times a day. 360 tablet 3     naloxone (NARCAN) 4 mg/actuation nasal spray 1 spray (4 mg dose) into one nostril for opioid reversal. Call 911. May repeat if no response in 3 minutes. 1 Box 0     rosuvastatin (CRESTOR) 20 MG tablet Take 1 tablet (20 mg total) by mouth daily. With one 40 mg tablet to equal 60 mg daily 90 tablet 1     rosuvastatin (CRESTOR) 40 MG tablet Take 1.5 tablets (60 mg total) by mouth at bedtime. 135 tablet 3     SUMAtriptan (IMITREX) 50 MG tablet Take 1 tablet (50 mg total) by mouth every 2 (two) hours as needed for migraine. 27 tablet 1     topiramate (TOPAMAX) 100 MG tablet Take 1 tablet (100 mg total) by mouth 2 (two) times a day. 180 tablet 1     traZODone (DESYREL) 100 MG tablet TAKE 2 TO 4 TABLETS(200  MG) BY MOUTH AT BEDTIME AS NEEDED FOR SLEEP 360 tablet 1     warfarin ANTICOAGULANT (COUMADIN/JANTOVEN) 5 MG tablet Take one to two tablets (5 to 10 mg) by mouth daily. Adjust dose based on INR results as directed. 180 tablet 3     Current Facility-Administered Medications   Medication Dose Route Frequency Provider Last Rate Last Dose     denosumab 60 mg (PROLIA 60 mg/ml)  60 mg Subcutaneous Q6 Months Andrei Olivera MD   60 mg at 10/11/19 1319       Social History     Tobacco  Use   Smoking Status Former Smoker     Packs/day: 1.00     Years: 20.00     Pack years: 20.00     Last attempt to quit: 2000     Years since quittin.0   Smokeless Tobacco Never Used           OBJECTIVE:        Recent Results (from the past 240 hour(s))   INR   Result Value Ref Range    INR 1.90 (H) 0.90 - 1.10   Comprehensive Metabolic Panel   Result Value Ref Range    Sodium 139 136 - 145 mmol/L    Potassium 3.9 3.5 - 5.0 mmol/L    Chloride 103 98 - 107 mmol/L    CO2 25 22 - 31 mmol/L    Anion Gap, Calculation 11 5 - 18 mmol/L    Glucose 213 (H) 70 - 125 mg/dL    BUN 15 8 - 28 mg/dL    Creatinine 1.30 (H) 0.60 - 1.10 mg/dL    GFR MDRD Af Amer 48 (L) >60 mL/min/1.73m2    GFR MDRD Non Af Amer 40 (L) >60 mL/min/1.73m2    Bilirubin, Total 0.5 0.0 - 1.0 mg/dL    Calcium 8.3 (L) 8.5 - 10.5 mg/dL    Protein, Total 6.4 6.0 - 8.0 g/dL    Albumin 3.7 3.5 - 5.0 g/dL    Alkaline Phosphatase 44 (L) 45 - 120 U/L    AST 15 0 - 40 U/L    ALT <9 0 - 45 U/L   Ferritin   Result Value Ref Range    Ferritin 420 (H) 10 - 130 ng/mL   Iron and Transferrin Iron Binding Capacity   Result Value Ref Range    Iron 53 42 - 175 ug/dL    Transferrin 186 (L) 212 - 360 mg/dL    Transferrin Saturation, Calculated 23 20 - 50 %    Transferrin IBC, Calculated 232 (L) 313 - 563 ug/dL   HM1 (CBC with Diff)   Result Value Ref Range    WBC 3.8 (L) 4.0 - 11.0 thou/uL    RBC 3.80 3.80 - 5.40 mill/uL    Hemoglobin 12.2 12.0 - 16.0 g/dL    Hematocrit 39.1 35.0 - 47.0 %     (H) 80 - 100 fL    MCH 32.1 27.0 - 34.0 pg    MCHC 31.2 (L) 32.0 - 36.0 g/dL    RDW 12.9 11.0 - 14.5 %    Platelets 154 140 - 440 thou/uL    MPV 10.1 8.5 - 12.5 fL    Neutrophils % 66 50 - 70 %    Lymphocytes % 22 20 - 40 %    Monocytes % 10 2 - 10 %    Eosinophils % 1 0 - 6 %    Basophils % 1 0 - 2 %    Neutrophils Absolute 2.5 2.0 - 7.7 thou/uL    Lymphocytes Absolute 0.8 0.8 - 4.4 thou/uL    Monocytes Absolute 0.4 0.0 - 0.9 thou/uL    Eosinophils Absolute 0.0 0.0 - 0.4  thou/uL    Basophils Absolute 0.0 0.0 - 0.2 thou/uL   Lipase   Result Value Ref Range    Lipase 220 (H) 0 - 52 U/L       Vitals:    01/17/20 1131   BP: 136/72   Patient Site: Right Arm   Patient Position: Sitting   Cuff Size: Adult Regular   Pulse: 92   SpO2: 98%   Weight: 123 lb 4.8 oz (55.9 kg)     Weight: 123 lb 4.8 oz (55.9 kg)          Physical Exam:  GENERAL APPEARANCE: A&A, NAD, well hydrated, well nourished  SKIN:  Normal skin turgor, no lesions/rashes   HEENT: moist mucous membranes, no rhinorrhea  NECK: Normal  CV: RRR, no M/G/R   LUNGS: CTAB  Abdomen: Soft, nontender to palpation, no guarding or rebound  EXTREMITY: no edema   NEURO: no gross deficits   Psych: Her affect is flat, she is tearful today, she is casually dressed and groomed, her thought process and speech pattern are normal

## 2021-06-05 NOTE — TELEPHONE ENCOUNTER
"Seen last week by pcp.  Past iron infusions.  Started Topamax for headaches.    Aches so bad in limbs.  Can hardly get off sofa.  Heavy weight on her.  Joints are painful x 2 days.    \"I can handle a lot of pain, medication effect me differently.\"    No hypertension.  \"something is going on in body\".    On blood thinners.  No recent injury. No cough or flu symptoms.    Wants to be seen today. Denies chest pain. Not sweaty.    Transferred to scheduling for an appointment.    Being seen at 2 today.  If symptoms get worse or change before being seen, call back to triage or go to ED.    Ragini Rowe RN [facility] Triage Nurse Advisor        Reason for Disposition    MODERATE weakness (i.e., interferes with work, school, normal activities) and cause unknown    Protocols used: WEAKNESS (GENERALIZED) AND FATIGUE-A-OH      "

## 2021-06-05 NOTE — TELEPHONE ENCOUNTER
ANTICOAGULATION  MANAGEMENT    Assessment     Today's INR result of 1.9 is Subtherapeutic (goal INR of 2.0-3.0)        Warfarin taken as previously instructed    No new diet changes affecting INR    No new medication/supplements affecting INR    Continues to tolerate warfarin with no reported s/s of bleeding or thromboembolism     Previous INR was Subtherapeutic    Plan:     Spoke with Sandy regarding INR result and instructed:     Warfarin Dosing Instructions:  Change warfarin dose to 10 mg daily on mon/wed/sat; and 7.5 mg daily rest of week  (4.3 % change)    Instructed patient to follow up no later than: two weeks with ov      Sandy verbalizes understanding and agrees to warfarin dosing plan.    Instructed to call the [facility] for any changes, questions or concerns. (#[phone])   ?   Franchesca Holman RN    Subjective/Objective:      Sandy ELSIE Spencer, a 79 y.o. female is on warfarin.     Sandy reports:     Home warfarin dose: as updated on anticoagulation calendar per template     Missed doses: No     Medication changes:  No     S/S of bleeding or thromboembolism:  No     New Injury or illness:  No     Changes in diet or alcohol consumption:  No     Upcoming surgery, procedure or cardioversion:  No    Anticoagulation Episode Summary     Current INR goal:   2.0-3.0   TTR:   39.5 % (9.6 mo)   Next INR check:   1/31/2020   INR from last check:   1.90! (1/17/2020)   Weekly max warfarin dose:      Target end date:      INR check location:      Preferred lab:      Send INR reminders to:   [facility]    Indications    Other chronic pulmonary embolism without acute cor pulmonale (H) [I27.82]           Comments:            Anticoagulation Care Providers     Provider Role Specialty Phone number    Caitlyn Rees MD Referring Family Medicine [phone]

## 2021-06-06 NOTE — PROGRESS NOTES
"Chief Complaint   Patient presents with     Constipation     xx 11 days - back and abdominal pain, cold, clamy and naused, requesting an x ray       HPI: This extremely complex 79-year-old female presents today with complaints about constipation.  She notes that she has had constipation off and on in the past and had a colectomy in her mid 30s.  She notes that she has not had a large bowel movement for 11 days but had a small bowel movement approximately 48 hours ago.  After further evaluation and history taking she tells me that she actually saw [facility] physician yesterday so I went into the median tab and saw that the notes from yesterday recommended that consideration of repeat enema and GoLYTELY prep be given given her complex situation.    ROS: No melena or hematochezia.  No abdominal pain although she complains of mild back pain.  No fever.    SH: Reviewed-see Snapshot for review.     FH: Reviewed-see Snapshot for review.    Meds:  Sandy has a current medication list which includes the following prescription(s): calcium carb/vitamin d3/vit k1, cyanocobalamin (vitamin b-12), diclofenac sodium, evolocumab, fentanyl, lactulose, lamotrigine, levothyroxine, magnesium, methocarbamol, naloxone, rosuvastatin, rosuvastatin, sumatriptan, topiramate, trazodone, and warfarin anticoagulant, and the following Facility-Administered Medications: denosumab.    O:  /60   Pulse 83   Temp 98.8  F (37.1  C)   Resp 18   Ht 5' 2\" (1.575 m)   Wt 129 lb 4 oz (58.6 kg)   SpO2 93%   Breastfeeding No   BMI 23.64 kg/m    Constitutional:    --Vitals as above  --No acute distress  Eyes-  --Sclera noninjected  --Lids and conjunctiva normal  Abdomen-  --Soft nontender no masses no guarding no rebound  Skin-  --Pink and dry    KUB- personal interpretation- nonspecific bowel gas patterns with mild amount of stool in the colon.  No free air under the diaphragm.  No evidence of obstruction.    A/P:   1.  Constipation  The " patient has constipation which is causing her great alarm.  She does not appear to be in distress however she would like resolution.  I reviewed the GI physicians note from yesterday in detail and I have asked her to continue to follow-up with him regarding continued care since he initiated the therapy yesterday.  In the note he asked that she call him for follow-up and she has failed to do so.  I have asked her to follow-up with him via phone and to request further treatment therapy.  - XR Abdomen AP; Future

## 2021-06-06 NOTE — PROGRESS NOTES
ASSESSMENT/PLAN:       1. Chronic kidney disease (CKD) stage G3a/A1, moderately decreased glomerular filtration rate (GFR) between 45-59 mL/min/1.73 square meter and albuminuria creatinine ratio less than 30 mg/g (H)  -Generally feels to be doing well.  Updating kidney function today.  Recommended she follow-up with urology one more time and then hopefully from there she can graduate from that clinic.  -Greater than 25 minutes was spent with the patient more than 50% of this spent in discussion of her chronic kidney disease and diet related to this  - Basic Metabolic Panel    2. Iron deficiency  -Updating iron studies today, and may need to consider iron supplementation orally or if she has not tolerated this well in the past repeating Venofer injections  - Ferritin  - Iron and Transferrin Iron Binding Capacity    3. Mixed hyperlipidemia  -Doing well on her current dosage of Crestor that was recommended by her cardiologist.  Renewing today doing 140 mg pill and 120 mg pill and updating her labs as well.  - Lipid Profile  - Hepatic Profile  - rosuvastatin (CRESTOR) 40 MG tablet; Take 1 tablet (40 mg total) by mouth at bedtime. With 20 mg for total dose of 60 mg daily  Dispense: 90 tablet; Refill: 3  - rosuvastatin (CRESTOR) 20 MG tablet; Take 1 tablet (20 mg total) by mouth daily. With one 40 mg tablet to equal 60 mg daily  Dispense: 90 tablet; Refill: 3    4. Constipation, unspecified constipation type  -Continuing to struggle with constipation and has done well with lactulose in the past.  Providing a prescription for to use.  - lactulose (ENULOSE) 10 gram/15 mL (15 mL); Take 15-30 mL (10-20 g total) by mouth daily as needed.  Dispense: 450 mL; Refill: 2    5. Pulmonary embolism (H)  -Doing okay on the warfarin although her numbers continue to be somewhat labile  - INR    6. Chronic cluster headache, not intractable  -She is going to change her Topamax to 50 mg daily in the evening and see how she does with her  symptoms.        Return in about 6 weeks (around 4/10/2020) for recheck.        Caitlyn Rees MD      PROGRESS NOTE   2/28/2020    SUBJECTIVE:  Sandy Spencer is a 79 y.o. female  who presents for follow up.     She was told by PT that she has too much acid and she should work on cutting out gluten and some other things. She is reading as well about her kidneys. She does wonder about getting rid of this.  She is checking her ph via saliva.      She did have shingles as well recently. She is taking 25 mg two times a day of the topamax. She does find that this is heling the pain in her left upper scalp. She is cutting the 100 mg pills in quarters to do this.     She is terribly constipated. She does wonder if she needs a prescription for Lactulose.  This has worked for her in the past.   Chief Complaint   Patient presents with     Pain     Patient is here today for a one month follow up in regards to her pain and headaches. Patient does feel her headaches are better, still having pain on the top left portion of her head.      Nutrition Counseling     Patient would like to discuss a acid-alkaline diet.          Patient Active Problem List   Diagnosis     Chronic kidney disease (CKD) stage G3a/A1, moderately decreased glomerular filtration rate (GFR) between 45-59 mL/min/1.73 square meter and albuminuria creatinine ratio less than 30 mg/g (H)     Focal glomerular sclerosis     RSD lower limb, bilateral     ADD (attention deficit disorder)     RSD upper limb, right     Osteopenia     Major depression     Hypertension     Insomnia     Mixed hyperlipidemia     Right rotator cuff tear     Cluster headaches     Lumbar radiculopathy     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     Temporomandibular joint-pain-dysfunction syndrome (TMJ)     Localized swelling of lower leg     Acquired hypothyroidism     Chronic pain syndrome     Snoring     Abdominal pain, generalized     Dermatochalasis of left eyelid      Bilateral carotid artery stenosis     Coronary artery disease involving native coronary artery of native heart without angina pectoris     Sinus bradycardia     Other chronic pulmonary embolism without acute cor pulmonale (H)     Splenic infarction     Opioid type dependence, continuous (H)     Hematuria     Hydronephrosis     Bladder spasms     Anxiety disorder due to medical condition     Family relationship problem     History of posttraumatic stress disorder (PTSD)     Generalized muscle weakness     Myofascial pain     Moderate major depression (H)     Elevated lipase     Abdominal bloating     Acquired absence of other specified parts of digestive tract     Ampullary stenosis     Obstruction of biliary tree     Anemia     Aphthous ulcer of ileum     Bipolar disorder, unspecified (H)     Disorder of phosphorus metabolism     Edema     Gastroesophageal reflux disease without esophagitis     Obstruction of common bile duct     Overflow diarrhea     History of partial colectomy     Complex regional pain syndrome       Current Outpatient Medications   Medication Sig Dispense Refill     calcium carb/vitamin D3/vit K1 (VIACTIV ORAL) Take 1 tablet by mouth daily.       cyanocobalamin, vitamin B-12, 5,000 mcg Subl Take 1 tablet by mouth.       diclofenac sodium (VOLTAREN) 1 % Gel Apply 4 g topically 4 (four) times a day. (apply to knees Q AM and Q 2pm and prn.  May self-administer) 100 g 5     lactulose (ENULOSE) 10 gram/15 mL (15 mL) Take 15-30 mL (10-20 g total) by mouth daily as needed. 450 mL 2     lamoTRIgine (LAMICTAL) 100 MG tablet Take 1 tablet (100 mg total) by mouth daily. 90 tablet 3     levothyroxine (LEVO-T) 50 MCG tablet Take 1 tablet (50 mcg total) by mouth Daily at 6:00 am. 90 tablet 3     MAGNESIUM ORAL Take 400 mg by mouth daily.              methocarbamol (ROBAXIN) 500 MG tablet Take 1 tablet (500 mg total) by mouth 4 (four) times a day. 360 tablet 3     naloxone (NARCAN) 4 mg/actuation nasal spray 1  spray (4 mg dose) into one nostril for opioid reversal. Call 911. May repeat if no response in 3 minutes. 1 Box 0     rosuvastatin (CRESTOR) 20 MG tablet Take 1 tablet (20 mg total) by mouth daily. With one 40 mg tablet to equal 60 mg daily 90 tablet 3     rosuvastatin (CRESTOR) 40 MG tablet Take 1 tablet (40 mg total) by mouth at bedtime. With 20 mg for total dose of 60 mg daily 90 tablet 3     SUMAtriptan (IMITREX) 50 MG tablet Take 1 tablet (50 mg total) by mouth every 2 (two) hours as needed for migraine. 27 tablet 1     topiramate (TOPAMAX) 25 MG tablet Take 2 tablets by mouth daily.       traZODone (DESYREL) 100 MG tablet TAKE 2 TO 4 TABLETS(200  MG) BY MOUTH AT BEDTIME AS NEEDED FOR SLEEP 360 tablet 1     warfarin ANTICOAGULANT (COUMADIN/JANTOVEN) 5 MG tablet Take one to two tablets (5 to 10 mg) by mouth daily. Adjust dose based on INR results as directed. 180 tablet 3     fentaNYL (DURAGESIC) 75 mcg/hr Place 1 patch on the skin every other day. 15 patch 0     Current Facility-Administered Medications   Medication Dose Route Frequency Provider Last Rate Last Dose     denosumab 60 mg (PROLIA 60 mg/ml)  60 mg Subcutaneous Q6 Months Andrei Olivera MD   60 mg at 10/11/19 1319       Social History     Tobacco Use   Smoking Status Former Smoker     Packs/day: 1.00     Years: 20.00     Pack years: 20.00     Last attempt to quit: 2000     Years since quittin.1   Smokeless Tobacco Never Used           OBJECTIVE:        Recent Results (from the past 240 hour(s))   INR   Result Value Ref Range    INR 3.20 (H) 0.90 - 1.10   Lipid Profile   Result Value Ref Range    Triglycerides 99 <=149 mg/dL    Cholesterol 270 (H) <=199 mg/dL    LDL Calculated 162 (H) <=129 mg/dL    HDL Cholesterol 88 >=50 mg/dL    Patient Fasting > 8hrs? Yes    Hepatic Profile   Result Value Ref Range    Bilirubin, Total 0.5 0.0 - 1.0 mg/dL    Bilirubin, Direct 0.2 <=0.5 mg/dL    Protein, Total 6.7 6.0 - 8.0 g/dL    Albumin 3.9  3.5 - 5.0 g/dL    Alkaline Phosphatase 44 (L) 45 - 120 U/L    AST 18 0 - 40 U/L    ALT 10 0 - 45 U/L   Basic Metabolic Panel   Result Value Ref Range    Sodium 137 136 - 145 mmol/L    Potassium 3.8 3.5 - 5.0 mmol/L    Chloride 104 98 - 107 mmol/L    CO2 21 (L) 22 - 31 mmol/L    Anion Gap, Calculation 12 5 - 18 mmol/L    Glucose 88 70 - 125 mg/dL    Calcium 8.9 8.5 - 10.5 mg/dL    BUN 17 8 - 28 mg/dL    Creatinine 1.08 0.60 - 1.10 mg/dL    GFR MDRD Af Amer 60 (L) >60 mL/min/1.73m2    GFR MDRD Non Af Amer 49 (L) >60 mL/min/1.73m2   Ferritin   Result Value Ref Range    Ferritin 473 (H) 10 - 130 ng/mL   Iron and Transferrin Iron Binding Capacity   Result Value Ref Range    Iron 72 42 - 175 ug/dL    Transferrin 190 (L) 212 - 360 mg/dL    Transferrin Saturation, Calculated 30 20 - 50 %    Transferrin IBC, Calculated 237 (L) 313 - 563 ug/dL       Vitals:    02/28/20 1022 02/28/20 1125   BP: 140/76 136/74   Patient Site: Left Arm Left Arm   Patient Position: Sitting Sitting   Cuff Size: Adult Regular Adult Regular   Pulse: 70    SpO2: 98%    Weight: 123 lb 4.8 oz (55.9 kg)      Weight: 123 lb 4.8 oz (55.9 kg)          Physical Exam:  GENERAL APPEARANCE: A&A, NAD, well hydrated, well nourished  SKIN:  Normal skin turgor, no lesions/rashes   HEENT: moist mucous membranes, no rhinorrhea  NECK: Normal  CV: RRR, no M/G/R   LUNGS: CTAB   EXTREMITY: no edema   NEURO: no gross deficits   Psych: Her affect is anxious, she is casually dressed and groomed, her thought process and speech pattern are normal

## 2021-06-06 NOTE — TELEPHONE ENCOUNTER
[facility] PA team  [phone]  Pool: HE PA MED ([id number])          PA has been initiated.       PA form completed and faxed insurance via Cover My Meds     Key:  [id number]     Medication:  fentaNYL 75MCG/HR 72 hr patches    Insurance:  [employer]        Response will be received via fax and may take up to 5-10 business days depending on plan

## 2021-06-06 NOTE — PATIENT INSTRUCTIONS - HE
1. Right hip suspect trochanteric bursitis. Please see if some treatment such as ultraound may be helpful for the hip pain.

## 2021-06-06 NOTE — PROGRESS NOTES
Assessment/Plan:     Problem List Items Addressed This Visit     Chronic pain syndrome - Primary    Relevant Medications    lamoTRIgine (LAMICTAL) 100 MG tablet    Other Relevant Orders    Pain Management Drug Panel, Urine            No follow-ups on file.    Patient Instructions   PLAN:     Increase Lamictal to 100 mg daily for pain, mood    Discussed anti-inflammatory diet, may contact [facility] to see if takes your insurance [phone]    Discussed may contact Dr. Thomas for electromagnetic therapy, tell him able to come in once a week, [phone]    Continue Fentanyl 75 mcg every 48 hours    Return in 8 weeks        Subjective:       79 y.o. female presents for management of multiple pain generators, including lower extremity chronic regional pain syndrome, migraine headache, lumbar degenerative changes.    She reviews today recurrence of the shingles which involves her right sciatica.  Reports is the 11th occasion.  She is on valacyclovir.  Describes a significant burning pain.    She describes Dr. Waite had done some [employer] testing.  She wishes to review with us with her psychotropic meds.  This was reviewed, I do not think it is relating to the lamotrigine.    She describes with increased pain, feeling like she wants to request increase in the patch but also he wants to discontinue the opioids.  She is sleeping poorly.    She is looking to make some changes in her diet.  She is ordered a pH test which she spits on the tells her she is in acetic state and wishes to have some resources to help with that.    She continues of lamotrigine and Topamax.  She went about increasing the Topamax.  We discussed other increase the lamotrigine from 75 mcg to 100, as I am concerned about the Topamax cognitive effects for her.    She has been continued physical therapy, he tells her is since that her pancreas is not working.  She describes having injury to this while addressing her gallbladder.    She did see   [facility] for electromagnetic therapy for 4 sessions.  However he wants her to continue going several times a week and she does not think she can manage that.    She continues getting iron infusions feeling there is sometimes that her energy is better.        Current Outpatient Medications:      calcium carb/vitamin D3/vit K1 (VIACTIV ORAL), Take 1 tablet by mouth daily., Disp: , Rfl:      diclofenac sodium (VOLTAREN) 1 % Gel, Apply 4 g topically 4 (four) times a day. (apply to knees Q AM and Q 2pm and prn.  May self-administer), Disp: 100 g, Rfl: 5     fentaNYL (DURAGESIC) 75 mcg/hr, Place 1 patch on the skin every other day., Disp: 15 patch, Rfl: 0     lactulose (ENULOSE) 10 gram/15 mL (15 mL), Take 20 g by mouth., Disp: , Rfl:      lamoTRIgine (LAMICTAL) 25 MG tablet, Take 50 mg in the AM and 25 mg at HS, Disp: 90 tablet, Rfl: 2     levothyroxine (LEVO-T) 50 MCG tablet, Take 1 tablet (50 mcg total) by mouth Daily at 6:00 am., Disp: 90 tablet, Rfl: 3     MAGNESIUM ORAL, Take 400 mg by mouth daily.    , Disp: , Rfl:      naloxone (NARCAN) 4 mg/actuation nasal spray, 1 spray (4 mg dose) into one nostril for opioid reversal. Call 911. May repeat if no response in 3 minutes., Disp: 1 Box, Rfl: 0     rosuvastatin (CRESTOR) 20 MG tablet, Take 1 tablet (20 mg total) by mouth daily. With one 40 mg tablet to equal 60 mg daily, Disp: 90 tablet, Rfl: 1     rosuvastatin (CRESTOR) 40 MG tablet, Take 1.5 tablets (60 mg total) by mouth at bedtime., Disp: 135 tablet, Rfl: 3     SUMAtriptan (IMITREX) 50 MG tablet, Take 1 tablet (50 mg total) by mouth every 2 (two) hours as needed for migraine., Disp: 27 tablet, Rfl: 1     topiramate (TOPAMAX) 25 MG tablet, Take 1 tablet by mouth daily., Disp: , Rfl:      traZODone (DESYREL) 100 MG tablet, TAKE 2 TO 4 TABLETS(200  MG) BY MOUTH AT BEDTIME AS NEEDED FOR SLEEP, Disp: 360 tablet, Rfl: 1     warfarin ANTICOAGULANT (COUMADIN/JANTOVEN) 5 MG tablet, Take one to two tablets (5 to 10 mg)  "by mouth daily. Adjust dose based on INR results as directed., Disp: 180 tablet, Rfl: 3     cyanocobalamin, vitamin B-12, 5,000 mcg Subl, Take 1 tablet by mouth., Disp: , Rfl:      lamoTRIgine (LAMICTAL) 100 MG tablet, Take 1 tablet (100 mg total) by mouth daily., Disp: 90 tablet, Rfl: 3     methocarbamol (ROBAXIN) 500 MG tablet, Take 1 tablet (500 mg total) by mouth 4 (four) times a day., Disp: 360 tablet, Rfl: 3    Current Facility-Administered Medications:      denosumab 60 mg (PROLIA 60 mg/ml), 60 mg, Subcutaneous, Q6 Months, Andrei Olivera MD, 60 mg at 10/11/19 1319  Is alert with a clear sensorium good eye contact.  Appropriately groomed.  Thought process tight logical.          Objective:     Vitals:    02/19/20 1340   BP: 106/55   Pulse: 83   Weight: 124 lb (56.2 kg)   Height: 5' 2\" (1.575 m)   PainSc:   7   PainLoc: Back         PLAN: Increase lamotrigine to 100 mg daily for pain and mood.    Discussed resources for the anti-inflammatory diet that may take her insurance.    Was given treatment for frequency specific microcurrent for postherpetic neuralgia and general emotional support.    Time spent more than 25 minutes face-to-face, 50% counts about above condition coronation treatment plan          This note has been dictated using voice recognition software. Any grammatical or context distortions are unintentional and inherent to the software  "

## 2021-06-06 NOTE — TELEPHONE ENCOUNTER
Who is calling:  Patient  Reason for Call:  Patient calls to confirm if the clinic has received the orders from [facility], as they are ordering Abdominal films.  Patient would like to come in for the x-rays if the orders have been sent.  Please advise the patient.  Date of last appointment with primary care: 2/28/20  Okay to leave a detailed message: Yes

## 2021-06-06 NOTE — TELEPHONE ENCOUNTER
----- Message from Luz Elena Ybarra MD sent at 3/3/2020  5:15 PM CST -----  I have reviewed her lab report.  Her LDL and total cholesterol level is still very high even she is taking Crestor 60 mg at bedtime.  Recommend adding Repatha 140 mg injection every two week.    Thanks  Wilbert

## 2021-06-06 NOTE — TELEPHONE ENCOUNTER
Pt called back to say she thinks she should start the medication previously suggested by Dr Ybarra.     See 3/4/20 telephone encounter:  ----- Message from Luz Elena Ybarra MD sent at 3/3/2020  5:15 PM CST -----  I have reviewed her lab report.  Her LDL and total cholesterol level is still very high even she is taking Crestor 60 mg at bedtime.  Recommend adding Repatha 140 mg injection every two week.     Repatha ordered.  Pt will continue on Crestor until receives Repatha.  -lindy

## 2021-06-06 NOTE — TELEPHONE ENCOUNTER
ANTICOAGULATION  MANAGEMENT    Assessment     Today's INR result of 3.2 is Supratherapeutic (goal INR of 2.0-3.0)        Warfarin taken as previously instructed    No new diet changes affecting INR - reports not much of a green eater, she may start     No interaction expected between topamax, lamotrigine  and warfarin    Potential interaction between crestor, lactulose  and warfarin which may affect subsequent INRs    Continues to tolerate warfarin with no reported s/s of bleeding or thromboembolism     Previous INR was Subtherapeutic    Plan:     Spoke with Sandy regarding INR result and instructed:     Warfarin Dosing Instructions:  Take 7.5 mg then continue current warfarin dose 7.5 mg daily on Sunday and Thursdays; and 10 mg daily rest of week  (0 % change)    Instructed patient to follow up no later than: 2 weeks     Education provided: importance of therapeutic range, importance of following up for INR monitoring at instructed interval and importance of taking warfarin as instructed    Sandy verbalizes understanding and agrees to warfarin dosing plan.    Instructed to call the [facility] for any changes, questions or concerns. (#[phone])   ?   Angie Rice RN    Subjective/Objective:      Sandy Spencer, a 79 y.o. female is on warfarin.     Sandy reports:     Home warfarin dose: verbally confirmed home dose with Sandy and updated on anticoagulation calendar     Missed doses: No     Medication changes:  No, patient had a change in Crestor which may cause affect on INR and lactulose dose was changed too, but previously on this medication, dose changes with topamax, lamotrigine should have no affect.      S/S of bleeding or thromboembolism:  No     New Injury or illness:  No     Changes in diet or alcohol consumption:  No     Upcoming surgery, procedure or cardioversion:  No    Anticoagulation Episode Summary     Current INR goal:   2.0-3.0   TTR:   37.0 % (11 mo)   Next INR check:   3/13/2020   INR from  last check:   3.20! (2/28/2020)   Weekly max warfarin dose:      Target end date:      INR check location:      Preferred lab:      Send INR reminders to:   [facility]    Indications    Other chronic pulmonary embolism without acute cor pulmonale (H) [I27.82]           Comments:            Anticoagulation Care Providers     Provider Role Specialty Phone number    Caitlyn Rees MD Referring Family Medicine [phone]

## 2021-06-06 NOTE — TELEPHONE ENCOUNTER
ANTICOAGULATION  MANAGEMENT    Assessment     Today's INR result of 1.5 is Subtherapeutic (goal INR of 2.0-3.0)        Warfarin taken as previously instructed    no real significant diet changes, if anything Sandy has decreased her Vit K intake    No interaction expected between valacyclovir and warfarin    Continues to tolerate warfarin with no reported s/s of bleeding or thromboembolism     Previous INR was low Therapeutic    Plan:     Spoke with Sandy regarding INR result and instructed:     Warfarin Dosing Instructions:  Change warfarin dose to 7.5 mg daily on Sun/Thur; and 10 mg daily rest of week  (8.3 % change)    Instructed patient to follow up no later than: 1-2 weeks    Education provided: importance of therapeutic range, target INR goal and significance of current INR result and monitoring for clotting signs and symptoms    Sandy verbalizes understanding and agrees to warfarin dosing plan.    Instructed to call the [facility] for any changes, questions or concerns. (#[phone])   ?   Shey Tilley RN    Subjective/Objective:      Sandy Spencer, a 79 y.o. female is on warfarin.     Sandy reports:     Home warfarin dose: as updated on anticoagulation calendar per template     Missed doses: No     Medication changes:  Yes: on valacyclovir for shingles     S/S of bleeding or thromboembolism:  No     New Injury or illness:  Yes: shingles     Changes in diet or alcohol consumption:  No     Upcoming surgery, procedure or cardioversion:  No    Anticoagulation Episode Summary     Current INR goal:   2.0-3.0   TTR:   36.0 % (10.5 mo)   Next INR check:   2/24/2020   INR from last check:      Weekly max warfarin dose:      Target end date:      INR check location:      Preferred lab:      Send INR reminders to:   [facility]    Indications    Other chronic pulmonary embolism without acute cor pulmonale (H) [I27.82]           Comments:            Anticoagulation Care Providers     Provider Role  Specialty Phone number    Caitlyn Rees MD Referring Family Medicine [phone]

## 2021-06-06 NOTE — TELEPHONE ENCOUNTER
Dr. Ybarra,    Please address. Per patient phone call, patient is wanting to start the Repatha injections. Please have your clinic contact the patient to get the process started.

## 2021-06-06 NOTE — PATIENT INSTRUCTIONS - HE
PLAN:     Increase Lamictal to 100 mg daily for pain, mood    Discussed anti-inflammatory diet, may contact [facility] to see if takes your insurance [phone]    Discussed may contact Dr. Thomas for electromagnetic therapy, tell him able to come in once a week, [phone]    Continue Fentanyl 75 mcg every 48 hours    Return in 8 weeks

## 2021-06-06 NOTE — PROGRESS NOTES
Patient is here for a follow up appointment with Dr. Lynn. Patient has legs, low back pain and her flare up with her RSD and Shingles, describes the pain as constant,burn, crusing, pins and needles and rates the pain as 7/10. Patient wants to talk about her pain relief and discuss her diet . Completed CSA/ UDT. Functionality is 6.

## 2021-06-06 NOTE — TELEPHONE ENCOUNTER
Patient was seen in clinic today 3/10/20 by Dr. Metzger, ABD x-ray was taken. Completing message at this time.

## 2021-06-06 NOTE — TELEPHONE ENCOUNTER
Per Dr. Rees - patient should be seen in clinic for the hip and ankle pain. / In regards to the Repatha injection, patient should contact Dr. Ybarra's office. / Okay to wait until 3/13 for INR.     Called the patient back. She is aware of Dr. Rees's recommendations, appointment scheduled with Dr. William for next week Wednesday for the hip and ankle pain as Dr. Rees is out of the clinic on Wednesday of next week. Patient will have INR done during appointment with Dr. William instead of having to drive back to the clinic on Friday 3/13.     Patient aware I will forward message to Dr. Ybarra about starting the Repatha injection.

## 2021-06-06 NOTE — PROGRESS NOTES
ASSESSMENT/PLAN:       1. Pulmonary embolism (H)     - INR, 3.4 which is up from 3.22 weeks ago.  Adjustments per anticoagulation management group with goal INR 2-3.    2. RSD lower limb, bilateral  The RSD in spite of valid efforts to improve pain is still been very disabling and likely is contributing to the symptoms she is experiencing in her right ankle and foot.  She asked about use of acetaminophen and I feel that is perfectly fine for her to use thousand milligrams 3 times a day.  I am not sure what she is describing when she was stating that there was something she took with acetaminophen to augment its use.  I mention Vicodin or hydrocodone and she did not think it was a narcotic.  For now I would simply encourage her to use the acetaminophen with regularity.    3. Constipation, unspecified constipation type  I would suggest that the patient use the MiraLAX 17 g 1-2 times a day and possibly every third day use a fleets enema to see if she can loosen the stool in the sigmoid and rectal area.  I did show her the x-ray from yesterday on her abdomen and also the stool is down in the sigmoid and rectal area.    4. Trochanteric bursitis of right hip  I am hesitant to give her corticosteroid injection.  I would start with physical therapy and would appreciate the therapists attention to the right hip and certainly could consider some ultrasound treatments and some exercises that would be focused to try to improve the pain she is having in the right lateral hip.    5. Primary osteoarthritis of both knees  I suggested that she follow-up with the pain clinic in regard to the Visco supplementation for her knees.  I did review the x-rays of her knees from a bit over a year ago.    Of note is that the patient states that the EMG was done about a year ago and the neurologist told her that she does not have neuropathy.    Also of note is that she has a solitary kidney left side only.            Adryan William,  MD      PROGRESS NOTE   3/11/2020    SUBJECTIVE:  Sandy Spencer is a 79 y.o. female  who presents for   Chief Complaint   Patient presents with     Hip Pain     pain in left ankle and heel and right hip pain and humurus in right leg     1. Pulmonary embolism (H)     Patient is in need of an INR today because of a history of pulmonary embolism.  She is on warfarin with a INR goal of 2-3.    2. RSD lower limb, bilateral  The patient has a history of reflex sympathetic dystrophy that involves 3 limbs sparing the left upper extremity.  The patient specifically is complaining of a burning type pain in her right ankle and heel region.  This comes and goes and is more noticeable at rest than it is when she is bearing weight and walking.  For chronic pain the patient uses fentanyl patch 75 mcg changed every other day, lamotrigine 100 mg daily, Robaxin 500 mg every 4 hours.  She was wondering what else could be added for pain.  She was wondering if it was okay to use acetaminophen and if so how much.  States that when she was in a transitional care unit they gave her something to augment the acetaminophen but she is not sure what that was.    3. Constipation, unspecified constipation type  The patient has had problems with constipation.  She has had the majority of her colon removed and has only about 9 inches of sigmoid colon still present.  The past couple weeks she has had very infrequent bowel movements and has used lactulose, GoLYTELY, fleets enemas, Metamucil along with trying to get good amounts of liquids other high-fiber foods and fruits.  Yesterday she did have a fairly large stool that was painful to pass and cause some bleeding.  She feels that only part of the bowel movement that was in the rectal area was passed.  Of interest is that she says after that her weight was about 4 pounds last.  She feels that she is having overflow diarrhea.    4. Trochanteric bursitis of right hip  The patient has been having  pain in her right hip laterally with difficulty lying on the right side.  She goes to the physical therapist almost on a weekly basis for generalized treatment of her multiple areas of pain.  This session is 1 hour.  There has not been any particular attention to the right hip pain.    5. Primary osteoarthritis of both knees  The patient has moderately severe osteoarthritis of both knees per x-rays done on 14 months ago.  The patient has had Synvisc injections per the pain clinic with Dr. Mittal and she thinks is been about 6 months since she had the last set of Synvisc.  She does feel that that provides some benefit.  She has listed allergies to cortisone shots but it sounds like that was more one incident which was an injection into her foot and she had after that prolonged pain swelling and significant disability.  She has had other times with cortisone shots including epidural injections and spine injections along with knee injections and has done okay.  Patient Active Problem List   Diagnosis     Chronic kidney disease (CKD) stage G3a/A1, moderately decreased glomerular filtration rate (GFR) between 45-59 mL/min/1.73 square meter and albuminuria creatinine ratio less than 30 mg/g (H)     Focal glomerular sclerosis     RSD lower limb, bilateral     ADD (attention deficit disorder)     RSD upper limb, right     Osteopenia     Major depression     Hypertension     Insomnia     Mixed hyperlipidemia     Right rotator cuff tear     Cluster headaches     Lumbar radiculopathy     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     Temporomandibular joint-pain-dysfunction syndrome (TMJ)     Localized swelling of lower leg     Acquired hypothyroidism     Chronic pain syndrome     Snoring     Abdominal pain, generalized     Dermatochalasis of left eyelid     Bilateral carotid artery stenosis     Coronary artery disease involving native coronary artery of native heart without angina pectoris     Sinus bradycardia      Other chronic pulmonary embolism without acute cor pulmonale (H)     Splenic infarction     Opioid type dependence, continuous (H)     Hematuria     Hydronephrosis     Bladder spasms     Anxiety disorder due to medical condition     Family relationship problem     History of posttraumatic stress disorder (PTSD)     Generalized muscle weakness     Myofascial pain     Moderate major depression (H)     Elevated lipase     Abdominal bloating     Acquired absence of other specified parts of digestive tract     Ampullary stenosis     Obstruction of biliary tree     Anemia     Aphthous ulcer of ileum     Bipolar disorder, unspecified (H)     Disorder of phosphorus metabolism     Edema     Gastroesophageal reflux disease without esophagitis     Obstruction of common bile duct     Overflow diarrhea     History of partial colectomy     Complex regional pain syndrome       Current Outpatient Medications   Medication Sig Dispense Refill     calcium carb/vitamin D3/vit K1 (VIACTIV ORAL) Take 1 tablet by mouth daily.       cyanocobalamin, vitamin B-12, 5,000 mcg Subl Take 1 tablet by mouth.       diclofenac sodium (VOLTAREN) 1 % Gel Apply 4 g topically 4 (four) times a day. (apply to knees Q AM and Q 2pm and prn.  May self-administer) 100 g 5     evolocumab 140 mg/mL PnIj Inject 140 mg under the skin every 14 (fourteen) days. 6 mL 3     fentaNYL (DURAGESIC) 75 mcg/hr Place 1 patch on the skin every other day. 15 patch 0     lactulose (ENULOSE) 10 gram/15 mL (15 mL) Take 15-30 mL (10-20 g total) by mouth daily as needed. 450 mL 2     lamoTRIgine (LAMICTAL) 100 MG tablet Take 1 tablet (100 mg total) by mouth daily. 90 tablet 3     levothyroxine (LEVO-T) 50 MCG tablet Take 1 tablet (50 mcg total) by mouth Daily at 6:00 am. 90 tablet 3     MAGNESIUM ORAL Take 400 mg by mouth daily.              methocarbamol (ROBAXIN) 500 MG tablet Take 1 tablet (500 mg total) by mouth 4 (four) times a day. 360 tablet 3     naloxone (NARCAN) 4  mg/actuation nasal spray 1 spray (4 mg dose) into one nostril for opioid reversal. Call 911. May repeat if no response in 3 minutes. 1 Box 0     rosuvastatin (CRESTOR) 20 MG tablet Take 1 tablet (20 mg total) by mouth daily. With one 40 mg tablet to equal 60 mg daily 90 tablet 3     rosuvastatin (CRESTOR) 40 MG tablet Take 1 tablet (40 mg total) by mouth at bedtime. With 20 mg for total dose of 60 mg daily 90 tablet 3     SUMAtriptan (IMITREX) 50 MG tablet Take 1 tablet (50 mg total) by mouth every 2 (two) hours as needed for migraine. 27 tablet 1     topiramate (TOPAMAX) 25 MG tablet Take 2 tablets by mouth daily.       traZODone (DESYREL) 100 MG tablet TAKE 2 TO 4 TABLETS(200  MG) BY MOUTH AT BEDTIME AS NEEDED FOR SLEEP 360 tablet 1     warfarin ANTICOAGULANT (COUMADIN/JANTOVEN) 5 MG tablet Take one to two tablets (5 to 10 mg) by mouth daily. Adjust dose based on INR results as directed. 180 tablet 3     Current Facility-Administered Medications   Medication Dose Route Frequency Provider Last Rate Last Dose     [START ON 3/25/2020] denosumab 60 mg (PROLIA 60 mg/ml)  60 mg Subcutaneous Q6 Months Caroline Sumner NP           Social History     Tobacco Use   Smoking Status Former Smoker     Packs/day: 1.00     Years: 20.00     Pack years: 20.00     Last attempt to quit: 2000     Years since quittin.2   Smokeless Tobacco Never Used           OBJECTIVE:        Recent Results (from the past 240 hour(s))   INR   Result Value Ref Range    INR 3.40 (H) 0.90 - 1.10       Vitals:    20 1401   BP: 110/72   Pulse: 83   SpO2: 98%   Weight: 126 lb 8 oz (57.4 kg)     Weight: 126 lb 8 oz (57.4 kg)          Physical Exam:  GENERAL APPEARANCE: Very pleasant 79-year-old female, NAD, well hydrated, well nourished  SKIN:  Normal skin turgor, no lesions/rashes     EXTREMITY: The patient has tenderness over the lateral right hip in the region of the greater trochanter.  Does not have similar symptoms to examination  of the left lateral trochanter.  Bilateral knees with hypertrophic changes mild synovitis and tenderness along the medial joint line.  Right ankle and foot reveal tenderness over the lateral aspect of the ankle posterior calcaneus, heel with compression medially and laterally.  She describes the pain that she gets in her heel and ankle as a burning type pain.  NEURO: no focal findings

## 2021-06-06 NOTE — TELEPHONE ENCOUNTER
Who is calling:  Patient  Reason for Call:  Patient has to leave for a dinner at 3 pm and wants to make sure that [facility] calls her before that time, as she will not be available by phone  Date of last appointment with primary care: n/a  Okay to leave a detailed message: Yes

## 2021-06-06 NOTE — TELEPHONE ENCOUNTER
Prior Authorization Request  Who s requesting:  [employer]  Pharmacy Name and Location: [facility]  Medication Name: fentanyl 75mcg patch, 15/month  Insurance Plan: [employer]  Insurance Member ID Number:    CoverMyMeds Key: N/A  Informed patient that prior authorizations can take up to 10 business days for response:   Yes  Okay to leave a detailed message: Yes

## 2021-06-06 NOTE — TELEPHONE ENCOUNTER
FLP / AST ordered.  LM stating recommendations and to call [phone] with any questions.  -rah    ----- Message -----  From: Luz Elena Ybarra MD  Sent: 3/4/2020   5:06 PM CST  To: Rufina Montenegro RN    Recheck Lipid profile and AST in 3 months.  Thanks  Wilbert

## 2021-06-06 NOTE — TELEPHONE ENCOUNTER
Called and spoke with the patient for clarification on message.    Per the patient, she was told by Dr. Ybarra that she should start Repatha 140 mg injection every two weeks due to recently lab results. Patient agreed to receiving the injections but is not sure what the process is... would she need to go to Dr. Ybarra's office to receive the injections - if so, that is a long drive every two weeks. If possible could the [facility] order them in and patient schedule nurse only visits at the clinic to receive?     Patient was seen yesterday 3/5/20 with her colon/GI doctor who was not happy with her last INR of 3.20 on 2/28/20. Patient wondering if she should have an INR done sooner than the scheduled one on 3/13/20.    Patient has been having right hip and right ankle pain. It feels like a burning type of pain. Sometimes it seems difficult to walk / get along with the pain. Recommended a appointment to be seen to discuss but patient wondering if some lab work can just be ordered instead to see if anything could be causing the pain. No known injury.

## 2021-06-06 NOTE — TELEPHONE ENCOUNTER
Results and recommendations relayed to pt.  Pt verbalized understanding and would like to continue on Crestor 60 mg.    Dr Ybarra - pt would like more time taking Crestor 60 mg, and said she just picked up a 90 day supply.  Do you want labs rechecked and how soon?  -lindy

## 2021-06-06 NOTE — TELEPHONE ENCOUNTER
Recommendations relayed to pt.  Pt verbalized understanding and had no questions.  Rosuvastatin rx updated, FLP ordered.  -ra    ----- Message -----  From: Luz Elena Ybarra MD  Sent: 3/12/2020   4:04 PM CDT  To: Rufina Montenegro RN    Continue Crestor 40 mg at bedtime.  Recheck Lipid profile in 2 months after starting Repatha.  Based on her number and then adjust the dosage of Crestor.  Thanks  Wilbert

## 2021-06-06 NOTE — TELEPHONE ENCOUNTER
"RN Assessment/Reason for Call:   Okay to leave Detailed Message  Sandy calling in, called x 4 from clinic, abnormal lab. No voicemail left.  Does not use My Chart  Your kidneys are improved which is great news. Your iron is a bit better as well.     \"He wants me to take shots; it was too high. PAD.\"  \"Recommend adding Repatha 140 mg injection every two week.\"  She will need a prior auth.    Saw Dr,severe pain in ankles and hip; infection. Burning worse.  Can't exercise from pain. Reflex sympthathic dystrophy. RSD.  [facility] pain clinic. 75 mcq Fentanyl.  INR;  Dr not happy.    RN Action/Disposition:  Call back if worse symptoms  Discussed home care measures.  Agrees to plan.     Jessica Acosta RN    [facility] Triage/med refill  3/6/2020  12:50 PM      "

## 2021-06-06 NOTE — TELEPHONE ENCOUNTER
----- Message from Mary Dumas sent at 3/12/2020  2:15 PM CDT -----  General phone call:  PATIENT WANTS TO KNOW, SHOULD SHE STAY ON CRESTOR WHILE STARTING REPATHA?  PLEASE CALL  Caller: PATIENT  Primary cardiologist: DR CANO  Detailed reason for call: SEE ABOVE  New or active symptoms? NO  Best phone number: [phone]  Best time to contact: ANY TIME  Ok to leave a detailed message? YES  Device? NO    Additional Info:

## 2021-06-06 NOTE — TELEPHONE ENCOUNTER
Medication Question or Clarification  Who is calling: Pharmacy  What medication are you calling about (include dose and sig)?:   rosuvastatin (CRESTOR) 20 MG tablet 90 tablet 3 2/28/2020     Sig - Route: Take 1 tablet (20 mg total) by mouth daily. With one 40 mg tablet to equal 60 mg daily - Oral    Sent to pharmacy as: rosuvastatin 20 mg tablet (CRESTOR)    E-Prescribing Status: Receipt confirmed by pharmacy (2/28/2020 11:14 AM CST      And   rosuvastatin (CRESTOR) 40 MG tablet 90 tablet 3 2/28/2020     Sig - Route: Take 1 tablet (40 mg total) by mouth at bedtime. With 20 mg for total dose of 60 mg daily - Oral    Sent to pharmacy as: rosuvastatin 40 mg tablet (CRESTOR)    E-Prescribing Status: Receipt confirmed by pharmacy (2/28/2020 11:14 AM CST)          Who prescribed the medication?: Caitlyn eRes MD  What is your question/concern?: 40 mg is the usual maximum dose.  Calling to confirm the patient is to start a total of 60 dose.  [facility]ed [facility]: [facility]  Okay to leave a detailed message?: Yes

## 2021-06-07 ENCOUNTER — COMMUNICATION - HEALTHEAST (OUTPATIENT)
Dept: PALLIATIVE MEDICINE | Facility: OTHER | Age: 81
End: 2021-06-07

## 2021-06-07 DIAGNOSIS — G90.523 COMPLEX REGIONAL PAIN SYNDROME TYPE 1 OF BOTH LOWER EXTREMITIES: ICD-10-CM

## 2021-06-07 NOTE — TELEPHONE ENCOUNTER
ANTICOAGULATION  MANAGEMENT    Assessment     Today's INR result of 1.3 is Subtherapeutic (goal INR of 2.0-3.0)        Warfarin taken as previously instructed - verbally confirmed took 7.5 mg Friday 5/1/2020, then 5 mg until INR recheck     No new diet changes affecting INR    No new medication/supplements affecting INR    Continues to tolerate warfarin with no reported s/s of bleeding or thromboembolism     Previous INR was Supratherapeutic     Patient is scheduled 5/13/2020 for lithotripsy, has been previously confirmed by Dr. Jernigan (urology) no hold for warfarin necessary for this procedure     Plan:     Spoke with Sandy regarding INR result and instructed:     Warfarin Dosing Instructions:  Boost, take 10 mg warfarin today then continue current warfarin dose 7.5 mg daily on Wednesday and Saturday; and 5 mg daily rest of week  (0 % change)    Patient read back above instructions.     Instructed patient to follow up no later than: 5/11/2020      Education provided: importance of taking warfarin as instructed and monitoring for clotting signs and symptoms    Sandy verbalizes understanding and agrees to warfarin dosing plan.    Instructed to call the [facility] for any changes, questions or concerns. (#[phone])   ?   Angie Rice RN    Subjective/Objective:      Sandy ZIMMERMAN Tj, a 79 y.o. female is on warfarin.     Sandy reports:     Home warfarin dose: template incorrect; verbally confirmed home dose with Sandy  and updated on anticoagulation calendar     Missed doses: No     Medication changes:  No     S/S of bleeding or thromboembolism:  No     New Injury or illness:  No     Changes in diet or alcohol consumption:  No     Upcoming surgery, procedure or cardioversion:  Yes: lithotripsy  5/13/2020    Anticoagulation Episode Summary     Current INR goal:   2.0-3.0   TTR:   35.2 % (1 y)   Next INR check:   5/11/2020   INR from last check:   1.30! (5/5/2020)   Weekly max warfarin dose:      Target end date:       INR check location:      Preferred lab:      Send INR reminders to:   [facility]    Indications    Other chronic pulmonary embolism without acute cor pulmonale (H) [I27.82]           Comments:            Anticoagulation Care Providers     Provider Role Specialty Phone number    Caitlyn Rees MD Referring Family Medicine [phone]

## 2021-06-07 NOTE — TELEPHONE ENCOUNTER
Can this patient get a home INR monitor, she would be a good candidate to not leave the house given her decreased mobility?

## 2021-06-07 NOTE — PROGRESS NOTES
"Assessment/Plan:     Problem List Items Addressed This Visit     Lumbar radiculopathy    Relevant Medications    fentaNYL (DURAGESIC) 75 mcg/hr (Start on 4/26/2020)    Chronic pain syndrome    Relevant Medications    lamoTRIgine (LAMICTAL) 100 MG tablet              No follow-ups on file.    Patient Instructions   PLAN:  Increase lamotrigine to 100 mg 1 in the morning and one half at bedtime to see if this will be helpful for pain.    Discussed opioid-induced constipation, will have a trial of Movantik 25 mg daily.  If this is not covered by insurance would then try Relistor.    Continue with your other management of constipation through the GI program.    Continue with the fentanyl patch 75 mcg every 48 hours.    Reschedule with Dr. Lynn in 8 weeks.        Subjective:       79 y.o. The patient has been notified of the following:      \"We have found that certain health care needs can be provided without the need for a face to face visit.  This service lets us provide the care you need with a phone conversation.       I will have full access to your [facility] medical record during this entire phone call.   I will be taking notes for your medical record.      Since this is like an office visit, we will bill your insurance company for this service.       There are potential benefits and risks of telephone visits (e.g. limits to patient confidentiality) that differ from in-person visits.?  Confidentiality still applies for telephone services, and nobody will record the visit.  It is important to be in a quiet, private space that is free of distractions (including cell phone or other devices) during the visit.??      If during the course of the call I believe a telephone visit is not appropriate, you will not be charged for this service\"     Consent has been obtained for this service by care team member: Yes  Kerry reviews working with the GI service.  She had an x-ray which showed stool, had not had a bowel movement " in 3 weeks.  She was given a fleets enema, glycerin suppositories lactulose.  She is now going every 2 to 3 days.  She notes having a colectomy with 9 inches of her large intestine removed.  She did not think we have ever use Movantik or Relistor.  Reviewing the record I do not see those prescribed on concern we have attempted these of insurance did not cover.    She has been working as well with nephrology, and having ultrasound of her kidney, being told it is inflamed and there are several kidney stones.  She is not sure the next steps.    She was started on gabapentin by her primary care provider which seemed to help some of the neuropathic pain.    She continues up at night due to the pain, having headaches.    Continue with the fentanyl 75 mcg every 48 hours.    Has been using lamotrigine 100 mg daily.    When last seen she was interested in some nutritional changes around her bodies pH.  References were given, but she could not afford them.    She did not look into working with Dr. Thomas and electromagnetic therapies.    Reviews we have tried a variety of herbal anti-inflammatories and she cannot afford anything out-of-pocket.      Current Outpatient Medications:      calcium carb/vitamin D3/vit K1 (VIACTIV ORAL), Take 1 tablet by mouth daily., Disp: , Rfl:      diclofenac sodium (VOLTAREN) 1 % Gel, Apply 4 g topically 4 (four) times a day. (apply to knees Q AM and Q 2pm and prn.  May self-administer), Disp: 100 g, Rfl: 5     evolocumab 140 mg/mL PnIj, Inject 140 mg under the skin every 14 (fourteen) days., Disp: 6 mL, Rfl: 3     [START ON 4/26/2020] fentaNYL (DURAGESIC) 75 mcg/hr, Place 1 patch on the skin every other day., Disp: 15 patch, Rfl: 0     gabapentin (NEURONTIN) 300 MG capsule, Take 1 capsule (300 mg total) by mouth 3 (three) times a day. Wean up as discussed, Disp: 90 capsule, Rfl: 1     lactulose (ENULOSE) 10 gram/15 mL (15 mL), Take 15-30 mL (10-20 g total) by mouth daily as needed., Disp: 450  mL, Rfl: 2     lamoTRIgine (LAMICTAL) 100 MG tablet, One tablet morning, one-half bedtime, Disp: 135 tablet, Rfl: 3     levothyroxine (LEVO-T) 50 MCG tablet, Take 1 tablet (50 mcg total) by mouth Daily at 6:00 am., Disp: 90 tablet, Rfl: 3     naloxone (NARCAN) 4 mg/actuation nasal spray, 1 spray (4 mg dose) into one nostril for opioid reversal. Call 911. May repeat if no response in 3 minutes., Disp: 1 Box, Rfl: 0     rosuvastatin (CRESTOR) 40 MG tablet, Take 1 tablet (40 mg total) by mouth at bedtime., Disp: 90 tablet, Rfl: 3     sodium phosphates 133 mL (FLEET ENEMA) 19-7 gram/118 mL Enem rectal enema, Take as directed by the preparation instructions, Disp: , Rfl:      SUMAtriptan (IMITREX) 50 MG tablet, Take 1 tablet (50 mg total) by mouth every 2 (two) hours as needed for migraine., Disp: 27 tablet, Rfl: 1     topiramate (TOPAMAX) 25 MG tablet, Take 2 tablets by mouth daily., Disp: , Rfl:      traZODone (DESYREL) 100 MG tablet, TAKE 2 TO 4 TABLETS(200  MG) BY MOUTH AT BEDTIME AS NEEDED FOR SLEEP, Disp: 360 tablet, Rfl: 1     warfarin ANTICOAGULANT (COUMADIN/JANTOVEN) 5 MG tablet, Take one to two tablets (5 to 10 mg) by mouth daily. Adjust dose based on INR results as directed., Disp: 180 tablet, Rfl: 3    Current Facility-Administered Medications:      denosumab 60 mg (PROLIA 60 mg/ml), 60 mg, Subcutaneous, Q6 Months, Caroline Sumner NP  Telephone sounds alert, clear sensorium, good eye contact.  Thought process tight logical.    Impression: Chronic pain includes history of lower extremity chronic regional pain syndrome, migraine headaches, lumbar degenerative changes.    Significant medical comorbidities.    Plan will have a trial of Movantik, 25 mg daily.  If not covered then will try Relistor.    We will increase the lamotrigine to 1 of 50 mg daily to see if it can augment the fentanyl for some of the pain complaints.  She will continue with the gabapentin at present while she works with  "nephrology.    Telephone time more than 18 minutes               Objective:     Vitals:    04/21/20 1423   Weight: 132 lb (59.9 kg)   Height: 5' 2.5\" (1.588 m)   PainSc:   8   PainLoc: Arm                 This note has been dictated using voice recognition software. Any grammatical or context distortions are unintentional and inherent to the software  "

## 2021-06-07 NOTE — TELEPHONE ENCOUNTER
Pt was in today for INR and would like to know if it would be option to have something to do home monitoring so she would l not have to leave her home during this virus crisis.      Please call her with further info and possible options.      03/23/20

## 2021-06-07 NOTE — PROGRESS NOTES
"Sandy Spencer is a 79 y.o. female who is being evaluated via a billable telephone visit.      The patient has been notified of following:     \"This telephone visit will be conducted via a call between you and your physician/provider. We have found that certain health care needs can be provided without the need for a physical exam.  This service lets us provide the care you need with a short phone conversation.  If a prescription is necessary we can send it directly to your pharmacy.  If lab work is needed we can place an order for that and you can then stop by our lab to have the test done at a later time.    Telephone visits are billed at different rates depending on your insurance coverage. During this emergency period, for some insurers they may be billed the same as an in-person visit.  Please reach out to your insurance provider with any questions.    If during the course of the call the physician/provider feels a telephone visit is not appropriate, you will not be charged for this service.\"    Patient has given verbal consent to a Telephone visit? Yes    Sandy Spencer complains of constipation.   Chief Complaint   Patient presents with     Follow-up     Six week follow up in regards to chronic kidney disease, iron deficiency, constipation, pulmonary embolism and cluster headaches. Patient continues to have constipation, the lactulose is not helping - [facility] recommended patient discuss with both Dr. Rees and pain clinic provider. Patient wonders about going back on gabapentin for RSV / chronic pain. While on gabapentin, patient did not have troubles with constipation. Patient wanting to discuss the CT scan for kidney and xray for constipation.        I have reviewed and updated the patient's Past Medical History, Social History, Family History and Medication List.    ALLERGIES  Acamprosate; Ambien [zolpidem]; Carbamazepine; Duloxetine; Lyrica [pregabalin]; Sulfa (sulfonamide antibiotics); Lamotrigine; " Amiloride; Amoxicillin; Aspirin; Augmentin [amoxicillin-pot clavulanate]; Blood-group specific substance; Chromium and derivatives; Clinoril [sulindac]; Flexeril [cyclobenzaprine]; Iron; Labetalol; Metoprolol; Mirtazapine; Nsaids (non-steroidal anti-inflammatory drug); Other allergy (see comments); Other drug allergy (see comments); Oxycodone; Serotonin; Serzone [nefazodone]; Tolmetin; Tylenol [acetaminophen]; Verapamil; Cortisone; Depakote [divalproex]; and Sulfacetamide sodium    Additional provider notes: insert own note template here     Sandy has a longstanding history of chronic pain.  She wonders about restarting gabapentin as her RSD has been flared recently.  Additionally she thinks that this would help with her chronic abdominal discomfort as well as perhaps her aches.  Historically she has been on as much as 3600 mg/day but most recently was on 900 mg daily which was discontinued in 2017.  She is unsure why but thinks maybe she just did not want to be on the medication again.  Her kidney function has decreased some since she was last on it, her GFR now is 35.    Additionally she continues to struggle quite a bit with what is likely a combination of irritable bowel syndrome as well as opioid induced constipation.  She is currently taking lactulose as needed but now has not had a bowel movement without any help in the last 2 weeks and is quite uncomfortable and bloated.  She has done several fleets enemas which have not helped, and has when gotten any stool out it is quite pasty and hard.  She is wondering if she could use a type of oil to help soften her stool.  She is also use glycerin suppositories.    She followed up with her nephrologist who recommended that she needed an ultrasound of her kidney to look for kidney stones given the pain that comes and goes.  He told her he was going to get this ordered but he did not.    She is also happy to report that she got a joe for her injectable cholesterol  medication which is made it much more affordable.  She has done 2 shots already and is due for her third shot here in a few days.    Assessment/Plan:  1. Constipation, unspecified constipation type  -Discussed further that with her medications as well as her history is going be very important for her to use a maintenance medication for her constipation once things have resolved.  She is going to do a warm water enema and consider adding in some mineral oil if available or considering all of oil as well to see if she can soften her stools.  She can continue using MiraLAX once daily as well as lactulose once daily, updated prescriptions for this.  We will follow-up in 3 to 4 weeks to make sure things have improved, she will call sooner if they are worsening and I will update her thyroid labs as well at her next check to make sure that this is adequately treated given her recent increase in symptoms.    2. Solitary left kidney  -I will order a renal ultrasound to look at her left kidney to see if there are any signs of obvious kidney stones or other abnormalities given her intermittent pain    3. Chronic pain syndrome  -Restarting gabapentin 300 mg orally daily for 1 week, twice daily for 1 week and then 3 times daily.  Again she will follow-up with me in approximately 3 to 4 weeks for recheck by phone.    4. Chronic cluster headache, not intractable  -Assess her symptoms once the gabapentin is added back, if doing well can consider decreasing the Topamax.    5. Chronic kidney disease (CKD) stage G3a/A1, moderately decreased glomerular filtration rate (GFR) between 45-59 mL/min/1.73 square meter and albuminuria creatinine ratio less than 30 mg/g (H)  -We will update her basic metabolic panel next time she comes in given her recent increase in GFR    6. Acquired hypothyroidism  -Given her increasing constipation we will check her labs at her next INR draw to ensure that she is adequately treated        Phone call  duration:  28 minutes    Caitlyn Rees MD

## 2021-06-07 NOTE — TELEPHONE ENCOUNTER
Pt requesting assistance paying for Repatha.    LM for pt to contact [facility] insurance counselors at [phone].  LM asking pt to call [phone] with further questions.  -lindy

## 2021-06-07 NOTE — TELEPHONE ENCOUNTER
Provider Review: Anticoagulation Annual Referral Renewal    ACM Renewal Decision:  Renew ACM warfarin management      INR Range:   Continue management at current INR goal   Anticipated Duration of Therapy (from today):  Long-term anticoagulation      Caitlyn Rees MD  8:09 AM

## 2021-06-07 NOTE — PROGRESS NOTES
"Sandy Spencer is a 79 y.o. female who is being evaluated via a billable telephone visit.      The patient has been notified of following:     \"This telephone visit will be conducted via a call between you and your physician/provider. We have found that certain health care needs can be provided without the need for a physical exam.  This service lets us provide the care you need with a short phone conversation.  If a prescription is necessary we can send it directly to your pharmacy.  If lab work is needed we can place an order for that and you can then stop by our lab to have the test done at a later time.    Telephone visits are billed at different rates depending on your insurance coverage. During this emergency period, for some insurers they may be billed the same as an in-person visit.  Please reach out to your insurance provider with any questions.    If during the course of the call the physician/provider feels a telephone visit is not appropriate, you will not be charged for this service.\"    Patient has given verbal consent to a Telephone visit? Yes    What phone number would you like to be contacted at? [phone]    Patient would like to receive their AVS by AVS Preference: Danish.    Additional provider notes:         SUBJECTIVE:  Sandy Spencer is a 79 y.o. female  who presents for 1 month follow-up.    The patient notes that since increasing her dose of gabapentin she has had increasing issues with her memory as well as difficulties putting on weight.  She did not think it helped with her pain either so she has self decreased back down to 300 mg once daily.  She would be interested in stopping this indefinitely.  She has been doing the 300 mg once daily for approximately 3 days.  To help with her pain she has been taking 1000 mg of Tylenol 2 times daily as well.    Additionally she has been scheduled for a lithotripsy through the kidney stone [place] for further evaluation of a possible kidney " stone as well as her chronic flank pain on the left side.  She had a urinalysis done last week showing microhematuria as well as calcium oxalate crystals.  She additionally has chronic kidney disease now which is new diagnosis since her right kidney was removed and her GFR is stable.    She continues to feel just generally quite fatigued and notes that her calcium is still low.  She is wondering if this is what is making her so tired.  She does take a daily calcium supplement but is unsure what the dose is.  She has never had a parathyroid checked and her most recent vitamin D was in 2015.  Chief Complaint   Patient presents with     Medication Management     Four week follow up since starting gabapentin. Patient has slowly started to decrease her intake as she noticed some memory issues while taking the gabapentin.      Lab Result Follow Up     Patient would like to review recent lab results.          Patient Active Problem List   Diagnosis     Chronic kidney disease (CKD) stage G3a/A1, moderately decreased glomerular filtration rate (GFR) between 45-59 mL/min/1.73 square meter and albuminuria creatinine ratio less than 30 mg/g (H)     Focal glomerular sclerosis     RSD lower limb, bilateral     ADD (attention deficit disorder)     RSD upper limb, right     Osteopenia     Major depression     Hypertension     Insomnia     Mixed hyperlipidemia     Right rotator cuff tear     Cluster headaches     Lumbar radiculopathy     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     Temporomandibular joint-pain-dysfunction syndrome (TMJ)     Localized swelling of lower leg     Acquired hypothyroidism     Chronic pain syndrome     Snoring     Abdominal pain, generalized     Dermatochalasis of left eyelid     Bilateral carotid artery stenosis     Coronary artery disease involving native coronary artery of native heart without angina pectoris     Sinus bradycardia     Other chronic pulmonary embolism without acute cor pulmonale  (H)     Splenic infarction     Opioid type dependence, continuous (H)     Hematuria     Hydronephrosis     Bladder spasms     Anxiety disorder due to medical condition     Family relationship problem     History of posttraumatic stress disorder (PTSD)     Generalized muscle weakness     Myofascial pain     Moderate major depression (H)     Elevated lipase     Abdominal bloating     Acquired absence of other specified parts of digestive tract     Ampullary stenosis     Obstruction of biliary tree     Anemia     Aphthous ulcer of ileum     Bipolar disorder, unspecified (H)     Disorder of phosphorus metabolism     Edema     Gastroesophageal reflux disease without esophagitis     Obstruction of common bile duct     Overflow diarrhea     History of partial colectomy     Complex regional pain syndrome     Solitary left kidney     Flank pain       Current Outpatient Medications   Medication Sig Dispense Refill     calcium carb/vitamin D3/vit K1 (VIACTIV ORAL) Take 1 tablet by mouth daily.       diclofenac sodium (VOLTAREN) 1 % Gel Apply 4 g topically 4 (four) times a day. (apply to knees Q AM and Q 2pm and prn.  May self-administer) 100 g 5     evolocumab 140 mg/mL PnIj Inject 140 mg under the skin every 14 (fourteen) days. 6 mL 3     fentaNYL (DURAGESIC) 75 mcg/hr Place 1 patch on the skin every other day. 15 patch 0     gabapentin (NEURONTIN) 300 MG capsule Take 1 capsule (300 mg total) by mouth 3 (three) times a day. Wean up as discussed (Patient taking differently: Take 300 mg by mouth 3 (three) times a day. Taking 1 capsule daily as of 5/3/20.) 90 capsule 1     lactulose (ENULOSE) 10 gram/15 mL (15 mL) Take 15-30 mL (10-20 g total) by mouth daily as needed. 450 mL 2     lamoTRIgine (LAMICTAL) 100 MG tablet One tablet morning, one-half bedtime 135 tablet 3     levothyroxine (LEVO-T) 50 MCG tablet Take 1 tablet (50 mcg total) by mouth Daily at 6:00 am. 90 tablet 3     naloxone (NARCAN) 4 mg/actuation nasal spray 1  spray (4 mg dose) into one nostril for opioid reversal. Call 911. May repeat if no response in 3 minutes. 1 Box 0     rosuvastatin (CRESTOR) 40 MG tablet Take 1 tablet (40 mg total) by mouth at bedtime. 90 tablet 3     sodium phosphates 133 mL (FLEET ENEMA) 19-7 gram/118 mL Enem rectal enema Take as directed by the preparation instructions       SUMAtriptan (IMITREX) 50 MG tablet Take 1 tablet (50 mg total) by mouth every 2 (two) hours as needed for migraine. 27 tablet 1     topiramate (TOPAMAX) 25 MG tablet Take 2 tablets by mouth daily.       traZODone (DESYREL) 100 MG tablet TAKE 2 TO 4 TABLETS(200  MG) BY MOUTH AT BEDTIME AS NEEDED FOR SLEEP 360 tablet 1     warfarin ANTICOAGULANT (COUMADIN/JANTOVEN) 5 MG tablet Take one to two tablets (5 to 10 mg) by mouth daily. Adjust dose based on INR results as directed. 180 tablet 3     Current Facility-Administered Medications   Medication Dose Route Frequency Provider Last Rate Last Dose     denosumab 60 mg (PROLIA 60 mg/ml)  60 mg Subcutaneous Q6 Months Caroline Sumner, NOMAN           Social History     Tobacco Use   Smoking Status Former Smoker     Packs/day: 1.00     Years: 20.00     Pack years: 20.00     Last attempt to quit: 2000     Years since quittin.3   Smokeless Tobacco Never Used           OBJECTIVE:        Recent Results (from the past 240 hour(s))   INR   Result Value Ref Range    INR 1.60 (H) 0.90 - 1.10   Basic Metabolic Panel   Result Value Ref Range    Sodium 140 136 - 145 mmol/L    Potassium 3.9 3.5 - 5.0 mmol/L    Chloride 104 98 - 107 mmol/L    CO2 29 22 - 31 mmol/L    Anion Gap, Calculation 7 5 - 18 mmol/L    Glucose 125 70 - 125 mg/dL    Calcium 8.2 (L) 8.5 - 10.5 mg/dL    BUN 22 8 - 28 mg/dL    Creatinine 1.50 (H) 0.60 - 1.10 mg/dL    GFR MDRD Af Amer 41 (L) >60 mL/min/1.73m2    GFR MDRD Non Af Amer 34 (L) >60 mL/min/1.73m2   Urinalysis   Result Value Ref Range    Color, UA Yellow Colorless, Yellow, Straw, Light Yellow    Clarity, UA  Clear Clear    Glucose, UA Negative Negative    Bilirubin, UA Negative Negative    Ketones, UA Negative Negative    Specific Gravity, UA 1.017 1.001 - 1.030    Blood, UA Trace (!) Negative    pH, UA 6.5 4.5 - 8.0    Protein, UA Trace (!) Negative mg/dL    Urobilinogen, UA <2.0 E.U./dL <2.0 E.U./dL, 2.0 E.U./dL    Nitrite, UA Negative Negative    Leukocytes, UA Negative Negative    Bacteria, UA None Seen None Seen hpf    RBC, UA 3-5 (!) None Seen, 0-2 hpf    WBC, UA 0-5 None Seen, 0-5 hpf    Squam Epithel, UA 0-5 None Seen, 0-5 lpf    Trans Epithel, UA 0-5 (!) None Seen lpf    Ca Oxalate Jessica, UA Present (!) None Seen   Culture, Urine    Specimen: Urine   Result Value Ref Range    Culture No Growth        Vitals:    05/04/20 1047   Weight: 135 lb (61.2 kg)     Weight: 135 lb (61.2 kg) (Pt reported)        Assessment/Plan:  1. Flank pain  -The patient and I discussed that I do think she needs a preop physical prior to her procedure next week as it will be in the hospital.  We will help coordinate setting this up.    2. Hypocalcemia  -Given her chronic now hypocalcemia over several measurements and going to check a parathyroid, magnesium and vitamin D.  If these are normal we will increase her vitamin supplementation.  Certainly could be secondary to poor absorption, her protein level is normal.  - Vitamin D, Total (25-Hydroxy); Future  - Magnesium; Future  - Parathyroid Hormone Intact with Minerals; Future    3. Chronic pain syndrome  -She is weaning off of the gabapentin, recommended doing the 300 mg once daily for another 2 days and then she can discontinue.  She will follow-up with me in 1 month for reevaluation.        Phone call duration:  21 minutes    Caitlyn Rees MD

## 2021-06-07 NOTE — TELEPHONE ENCOUNTER
ANTICOAGULATION  MANAGEMENT    Assessment     Today's INR result of 3.8 is Supratherapeutic (goal INR of 2.0-3.0)        Warfarin taken as previously instructed    No new diet changes affecting INR    No new medication/supplements affecting INR    Continues to tolerate warfarin with no reported s/s of bleeding or thromboembolism     Previous INR was Therapeutic    Plan:     Spoke with Sandy regarding INR result and instructed:     Warfarin Dosing Instructions:  Change warfarin dose to 5 mg daily on Tuesdays; and 10 mg daily rest of week  (10.3 % change)    Patient has taken her warfarin dose already today    Instructed patient to follow up no later than: 5/1/2020 per Dr. Rees     Education provided: importance of therapeutic range, importance of following up for INR monitoring at instructed interval and importance of taking warfarin as instructed     Unable to provide any progress as far as home monitoring system.     Sandy verbalizes understanding and agrees to warfarin dosing plan.    Instructed to call the [facility] for any changes, questions or concerns. (#[phone])   ?   Angie Rice RN    Subjective/Objective:      Sandy Spencer, a 79 y.o. female is on warfarin.     Sandy reports:     Home warfarin dose: verbally confirmed home dose with Sandy  and updated on anticoagulation calendar     Missed doses: No     Medication changes:  No     S/S of bleeding or thromboembolism:  No     New Injury or illness:  No     Changes in diet or alcohol consumption:  No     Upcoming surgery, procedure or cardioversion:  No    Anticoagulation Episode Summary     Current INR goal:   2.0-3.0   TTR:   35.5 % (1 y)   Next INR check:   5/4/2020   INR from last check:   3.80! (4/20/2020)   Weekly max warfarin dose:      Target end date:      INR check location:      Preferred lab:      Send INR reminders to:   [facility]    Indications    Other chronic pulmonary embolism without acute cor pulmonale (H)  [I27.82]           Comments:            Anticoagulation Care Providers     Provider Role Specialty Phone number    Caitlyn Rees MD Referring Family Medicine [phone]

## 2021-06-07 NOTE — TELEPHONE ENCOUNTER
----- Message from Debra Morel CMA sent at 4/21/2020 10:42 AM CDT -----  Called and spoke with the patient, she is aware. Has a telephone apt tomorrow, 4/22 with the kidney specialist, provider: Rudolph Jernigan MD.    Patient would like a telephone call from Dr. Rees the end of the week or early next week after reviewing Dr. Jernigan's notes from 4/22. Patient just wanting to discuss how the telephone visit went and discuss moving forward.

## 2021-06-07 NOTE — TELEPHONE ENCOUNTER
"Anticoagulation Annual Referral Renewal Review    Sandy Spencer's chart reviewed for annual renewal of referral to anticoagulation monitoring.        Criteria for anticoagulation nurse and/or pharmacist renewal met   Warfarin indication: PE No, provider assessment if extended anticoagulation required   Current with INR monitoring/compliant Yes Yes   Date of last office visit 4/08/2020 Yes, had office visit within last year   Time in Therapeutic Range (TTR) 35.5 % No, TTR < 60 %       Sandy Spencer did NOT meet all criteria for anticoagulation management program initiated renewal and requires provider review. Using dot phrase, \".acmrenewalprovider\", please advise if Sandy's anticoagulation management referral should be renewed or if patient should be seen in office to review anticoagulation therapy      Angie Rice RN  5:54 PM      "

## 2021-06-07 NOTE — TELEPHONE ENCOUNTER
Fax came in from [facility] inr phycisician order form. Fax in Dr Montoya inbox.
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
## 2021-06-07 NOTE — TELEPHONE ENCOUNTER
Central PA team  [phone]  Pool: HE PA MED ([id number])          PA has been initiated.       PA form completed and faxed insurance via Cover My Meds     Key:  [id number]     Medication:  MOVANTIK    Insurance:  [employer]        Response will be received via fax and may take up to 5-10 business days depending on plan
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
## 2021-06-07 NOTE — TELEPHONE ENCOUNTER
Pt has visit 5/4/20. She wants to keep this appt date.    She is concerned about her kidney labs and wants the CT scan scheduled that Dr Jernigan has suggested during appt yesterday 4/22/20. This CT is not yet ordered. She will call his office back and see what the plan is and let us know.     She states that she believes she's the only one that's concerned about her kidney as she only has one. She doesn't want to wait until the COVID pandemic is over.

## 2021-06-07 NOTE — TELEPHONE ENCOUNTER
Called patient to ensure she was ok with the referral to urology based on the nonobstructing but symptomatic left nephrolithiasis with a solitary left kidney. She was and the referral was placed.
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
## 2021-06-07 NOTE — TELEPHONE ENCOUNTER
ANTICOAGULATION  MANAGEMENT    Assessment     Today's INR result of 1.6 is Subtherapeutic (goal INR of 2.0-3.0)        Less warfarin taken than instructed which may be affecting INR    No new diet changes affecting INR    No new medication/supplements affecting INR    Continues to tolerate warfarin with no reported s/s of bleeding or thromboembolism     Previous INR was Supratherapeutic     Patient is having a cytoscopy, lithotripsy, calculus removal, ureteral stent insertion 5/6/2020, patient denies any dose adjustments instructed thus far, she has a virtual visit with PCP Rees 5/4/2020    Plan:     Spoke with Sandy regarding INR result and instructed:     Warfarin Dosing Instructions:  Change warfarin dose to 7.5 mg daily on Tuesday and Fridays; and 5 mg daily rest of week  (14 % change)    Instructed patient to follow up no later than: 5/5/2020    Education provided: importance of taking warfarin as instructed    Sandy verbalizes understanding and agrees to warfarin dosing plan.    Instructed to call the [facility] for any changes, questions or concerns. (#[phone])   ?   Angie Rice RN    Subjective/Objective:      Sandy Spencer, a 79 y.o. female is on warfarin.     Sandy reports:     Home warfarin dose: verbally confirmed home dose with Sandy and updated on anticoagulation calendar     Missed doses: No, not taking as instructed      Medication changes:  No     S/S of bleeding or thromboembolism:  No     New Injury or illness:  No     Changes in diet or alcohol consumption:  No     Upcoming surgery, procedure or cardioversion:  Yes:cytoscopy, lithotripsy, calculus removal, ureteral stent insertion     Anticoagulation Episode Summary     Current INR goal:   2.0-3.0   TTR:   35.2 % (1 y)   Next INR check:   5/5/2020   INR from last check:   1.60! (5/1/2020)   Weekly max warfarin dose:      Target end date:      INR check location:      Preferred lab:      Send INR reminders to:   [facility]     Indications    Other chronic pulmonary embolism without acute cor pulmonale (H) [I27.82]           Comments:            Anticoagulation Care Providers     Provider Role Specialty Phone number    Caitlyn Rees MD Referring [facility] Medicine [phone]

## 2021-06-07 NOTE — TELEPHONE ENCOUNTER
Pt stopped in to the  Clinic today, she had not heard from specialty office. There had been an order for urol. And she [place] go [place] [facility] and sees [place]. Casillas there, she does not want to go there, but is asking for an order to be seen at our [facility] Instead. She has had radiology tests recently. Thank you

## 2021-06-07 NOTE — TELEPHONE ENCOUNTER
Anticoagulation Management    Discussed INR home monitoring program with Sandy Spencer reviewing:      Elibigility requirements: >= 3 months of anticoagulation therapy, indication for chronic anticoagulation and order from provider    Required testing frequency (q1-2 weeks)    Home meters, testing supplies, meter training, and reporting of INR results done through an outside company. Patient would be contacted by home monitoring company to review insurance coverage with home monitoring company prior to enrolling.    [facility] would continue to receive and manage INR results.    Home monitoring application may take several weeks and must continue to follow up with recommended INR monitoring in clinic until receives monitor and training completed.     Home monitoring terms reviewed with patient      Patient agrees to frequency of testing as directed by referring provider ( weekly or biweekly) Yes    Testing to be performed during business hours of [facility] Yes    Patient agrees they have the skill ( or a designated caregiver) necessary to perform the self test Yes    Patient agrees to report all INR results to INR home monitoring company Yes    Patient agrees to have additional INR test in clinic if a home result is critical Yes    Patient agrees to schedule an INR test at a [facility] clinic yearly for technique observation and quality check of INR results with their home meter Yes    Patient agrees to use a [facility] approved service provider and device for home monitoring Yes          Sandy Spencer is interested home INR monitoring and requests order be submitted.        Angie Rice RN

## 2021-06-07 NOTE — TELEPHONE ENCOUNTER
2020 Prior Authorization Request  Who's requesting PA: Pharmacy  Provider: Shane  Pharmacy Name, Location & Phone # : [facility]/[phone]  Medication Name: movantik 12.5 mg, 2/day  Quantity: 30  Refills: 2  Days Supply: 15  Medication Instructions: 2 tabs daily  Insurance Plan: [employer]  Insurance Member ID & GRP # :  CoverMyMeds Key: [id number]  Informed patient that prior authorizations can take up to 10 business days for response: YES  Okay to leave a detailed message: YES    Route to: HE PA MED ([id number])

## 2021-06-07 NOTE — TELEPHONE ENCOUNTER
ANTICOAGULATION  MANAGEMENT    Assessment     Today's INR result of 1.6 is Subtherapeutic (goal INR of 2.0-3.0)        Warfarin taken as previously instructed    No new diet changes affecting INR    No new medication/supplements affecting INR    Continues to tolerate warfarin with no reported s/s of bleeding or thromboembolism     Previous INR was Supratherapeutic      Patient has not been therapeutic since January     Plan:     Spoke with Sandy regarding INR result and instructed:     Warfarin Dosing Instructions:  Boost take 12.5 mg tonight only  then change warfarin dose to 12.5 mg daily on Wednesdays; and 10 mg daily rest of week  (11.5 % change)    Instructed patient to follow up no later than: 2 weeks     Education provided: importance of therapeutic range, importance of following up for INR monitoring at instructed interval and importance of taking warfarin as instructed, home monitoring patient expressed concerns related to health condition concerned about exposure to COVID19    Sandy verbalizes understanding and agrees to warfarin dosing plan.    Instructed to call the [facility] for any changes, questions or concerns. (#[phone])   ?   Angie Rice RN    Subjective/Objective:      Sandy Spencer, a 79 y.o. female is on warfarin.     Sandy reports:     Home warfarin dose: verbally confirmed home dose with Sandy and updated on anticoagulation calendar     Missed doses: No     Medication changes:  No     S/S of bleeding or thromboembolism:  No     New Injury or illness:  No     Changes in diet or alcohol consumption:  No     Upcoming surgery, procedure or cardioversion:  No    Anticoagulation Episode Summary     Current INR goal:   2.0-3.0   TTR:   36.3 % (11.8 mo)   Next INR check:   4/6/2020   INR from last check:   1.60! (3/23/2020)   Weekly max warfarin dose:      Target end date:      INR check location:      Preferred lab:      Send INR reminders to:   [facility]    Indications     Other chronic pulmonary embolism without acute cor pulmonale (H) [I27.82]           Comments:            Anticoagulation Care Providers     Provider Role Specialty Phone number    Caitlyn Rees MD Referring Family Medicine [phone]        Anticoagulation Management    Discussed INR home monitoring program with Sandy Spencer reviewing:      Elibigility requirements: >= 3 months of anticoagulation therapy, indication for chronic anticoagulation and order from provider    Required testing frequency (q1-2 weeks)    Home meters, testing supplies, meter training, and reporting of INR results done through an outside company. Patient would be contacted by home monitoring company to review insurance coverage with home monitoring company prior to enrolling.    [employer] would continue to receive and manage INR results.    Home monitoring application may take several weeks and must continue to follow up with recommended INR monitoring in clinic until receives monitor and training completed.     Home monitoring terms reviewed with patient      Patient agrees to frequency of testing as directed by referring provider ( weekly or biweekly) Yes    Testing to be performed during business hours of [facility] Yes    Patient agrees they have the skill ( or a designated caregiver) necessary to perform the self test Yes    Patient agrees to report all INR results to INR home monitoring company Yes    Patient agrees to have additional INR test in clinic if a home result is critical Yes    Patient agrees to schedule an INR test at a [facility] clinic yearly for technique observation and quality check of INR results with their home meter Yes    Patient agrees to use a [facility] approved service provider and device for home monitoring Yes        Sandy Spencer is interested home INR monitoring and requests order be submitted.        Angie Rice RN

## 2021-06-07 NOTE — PATIENT INSTRUCTIONS - HE
PLAN:  Increase lamotrigine to 100 mg 1 in the morning and one half at bedtime to see if this will be helpful for pain.    Discussed opioid-induced constipation, will have a trial of Movantik 25 mg daily.  If this is not covered by insurance would then try Relistor.    Continue with your other management of constipation through the GI program.    Continue with the fentanyl patch 75 mcg every 48 hours.    Reschedule with Dr. Lynn in 8 weeks.

## 2021-06-08 ENCOUNTER — OFFICE VISIT - HEALTHEAST (OUTPATIENT)
Dept: OCCUPATIONAL THERAPY | Facility: REHABILITATION | Age: 81
End: 2021-06-08

## 2021-06-08 DIAGNOSIS — M79.644 PAIN IN FINGER OF RIGHT HAND: ICD-10-CM

## 2021-06-08 DIAGNOSIS — R29.898 RIGHT HAND WEAKNESS: ICD-10-CM

## 2021-06-08 DIAGNOSIS — Z78.9 DECREASED ACTIVITIES OF DAILY LIVING (ADL): ICD-10-CM

## 2021-06-08 NOTE — PROGRESS NOTES
"    DATE OF SERVICE: 01/20/2017    Sandy reviews that she had had pain in her stomach for a week for which she was  hospitalized.  This had returned again.  She had elevated triglycerides, pancreatic  enzymes were up.  She noted the next day enzymes were normal and she was discharged,  though she had had nothing to eat.  She has followed up now with [facility], where they saw gallstones, was concerned that she had a closed  bile duct and a stent was placed for a week.    She reviews 3-1/2 years ago having her gallbladder removed, had to go back for  another surgery and was hospitalized for 51 days.  She is 40 staples placed and was  told that she should never have staples again as her body did not tolerate it.  She  is very concerned whether staples had been placed at this time recently  laparoscopically.  She had lymphedema at the time.    She reviews since last seen her fentanyl patches are not staying on.  She wants a  letter to be able have to take to the emergency room when she goes in that her  verapamil and citalopram are continued as she gets cluster headaches if they do not.    She shows me a capsule somebody left at her door for which she was told it was  medical cannabis and a $2 cost.    She reviews stresses with her daughter while she was hospitalized, describes  frustration that she had been helping her son move, then no longer able to continue  that.  She had been planning to have people bring some of her furniture from [place]  and they had to turn around.    She reviews also being at the [facility] for 10 days after this  interaction happened with her daughter and she \\"lost it.\\"  On discharge she was  given a diagnosis of bipolar and schizoaffective disorder.  She looked this up with  Dr. Rees who then sent her to an evaluation and she is now working with a  therapist, Natalia Cruz at [phone] through the [facility].  She  notes she does have " a brother who has bipolar disorder 14 years younger.  She  believes that her daughter provided information as they would like to get her money.   She recalls taking testing long ago during her marriage with a psychologist which  was unrevealing.  She reviews while in the hospital when she can have massage lotion  on her lower extremity that helps decrease her blood pressure and is frustrated they  did not listen to this.    She has an appointment in February at [facility].  She wonders whether she could  have a celiac block for the pancreatic pain before then.  She has been using Tylenol  #3 two tablets 2 or 3 times a day without benefit after the surgery.    When last seen we had recommended levorphanol, insurance did not cover that.  As  such, we went to the fentanyl.    We have discussed low-dose naltrexone with concern for chronic regional pain  syndrome.    Blood pressure 149/75, pulse 71, pain score 7.  She is alert with a clear sensorium,  appropriately groomed.  Affect full range, congruent as she describes distress with  her children.  She holds her hands to her stomach as she is talking.    IMPRESSION:  Chronic pain as related to complex regional pain syndrome and migraine  headaches.  More recently, has had abdominal pain where apparently there are  findings about gallbladder disease.  She reports having significant experience with  gallbladder surgery 3-1/2 years ago, and concerned that staples were used at that  time and again.    She is concerned that a diagnosis was made at a recent mental health hospitalization  with diagnoses of schizoaffective and bipolar disorder.    PLAN:  She has signed a release of information.  I will speak to her therapist.  I  have not seen the discharge summary in Care Everywhere to review.  I have not had  evidence that in my interactions the patient has evidence of a schizoaffective  condition or bipolar disorder.  She noted when daughter made report about being  a  shopaholic and lucía [employer] mary[employer] that her friends comment that she may  occasionally go to the [employer] store, but cannot afford this.    Message was left with [facility] to see if she could have a celiac block as she is  complaining of abdominal pain.    We will use oxycodone 10 mg up to 4 times a day while the abdominal pain is being  addressed, as there is a problem with the fentanyl patch.  She will ask the  [facility] to review where the clips were placed.    Time spent 25 minutes face to face, more than 50% counseling about above condition  and coordination of treatment plan.      MD Indira MCDERMOTT 01/26/2017 17:47:43  T 01/26/2017 18:32:14  R 01/26/2017 18:32:14  [id number]        cc:SANJUANITA ZAFAR MD

## 2021-06-08 NOTE — TELEPHONE ENCOUNTER
Allie Team    Pt does not want to get prolia injection. Due to cost. She is paying 2k out of pocket. Is there any assistance she can get? Or alternative medication?    She can be reached @ [phone]

## 2021-06-08 NOTE — TELEPHONE ENCOUNTER
I spoke with pt regarding Forteo and Tymlos, and she is interested/willing to start treatment. Ordered Teriparatide. Pt has decided that if it is covered and she can afford it, she will start, if she finds that she cannot afford it, she won't pick it up.

## 2021-06-08 NOTE — TELEPHONE ENCOUNTER
Pt is positive for C-19. She was tested due to upcoming kidney procedure. She has not shortness of breath and is staying home. Encouraged to stay hydrated and call her PCP or go to ED if any issues. Will notify Dr. Ybarra

## 2021-06-08 NOTE — TELEPHONE ENCOUNTER
----- Message from Luz Elena Ybarra MD sent at 6/9/2020 10:57 AM CDT -----  Please let the patient know her total cholesterol and LDL are significantly improved.  Good news!  Wilbert

## 2021-06-08 NOTE — TELEPHONE ENCOUNTER
"Isn't that just peachy?  We can't treat her because she \"only\" has Osteopenia, with high fracture risk.  She can't take Bisphosphonates because of Renal function and that leaves..... NOTHING. I am unsure if she would qualify for Forteo or Tymlos, and they are both expensive as well.  WTF to we tell people?!  Sorry, you don't matter until you have a compression fracture in your back or another break and then are considered to have Osteoporosis in spite of your T scores.  "

## 2021-06-08 NOTE — TELEPHONE ENCOUNTER
Patient tested positive for covid on 5/10, done prior to surgery. Is feeling ok, but has had a headache for one week.     She has had no shortness of breath, cough or fever.     Understands to call 911 if developing shortness of breath, recommended letting the building that she lives in the diagnosis.     Will contact tomorrow to make sure she is doing ok and refer to nando dias.

## 2021-06-08 NOTE — PROGRESS NOTES
[facility] Daily Progress     Patient Name: Sandy Spencer  Date: 2017  Visit #4  Referral Diagnosis: Pain  Referring provider: Nikunj Lynn*  Visit Diagnosis:     ICD-10-CM    1. Abdominal pain, unspecified location R10.9    2. Left-sided low back pain without sciatica, unspecified chronicity M54.5    3. History of surgery Z98.890    4. History of cholecystectomy Z90.49          Assessment:     Patient is appropriate to continue with skilled physical therapy intervention, as indicated by initial plan of care.    Goal Status:  Pt. will demonstrate/verbalize independence in self-management of condition in : 12 weeks  Pt. will be independent with home exercise program in : 12 weeks  Pt. will report decreased intensity, frequency of : Pain;in other weeks;Comment  Other Weeks:: 16 weeks  Comment:: 20% decrease in pain  Pt. will have improved quality of sleep: waking less times/night;in other weeks;Comment  In Other Weeks: 16 weeks  Comment:: sleeping 4-6 hours/night 50-75% of the time  Patient will show increased : tolerance for ___;in 12 weeks  Patient will show increased tolerance for : daily activities    Plan / Patient Education:     Continue with initial plan of care.    Subjective:     Pain Ratin  Had routine colonoscopy on Tues.  Had multiple polyps removed. She reports decreased low back, abdominal pain since then.  Primary complaint today is (L) lower abd pain.       Objective:     Tenderness of (B) post 12 rib, (B) ant/lat 10th rib, (L) post/lat lower ribs,   Fascial restrictions of mes root, sm intestine noted.     Treatment Today     TREATMENT MINUTES COMMENTS   Evaluation     Self-care/ Home management     Manual therapy 55 Induction, indirect, direct techniques utilized as appropriate for optimal tissue release.   MFR/SCS - (B) MR-V, sm intestine, (B) FOT10-V, (L) LGI-LV, (L) MGI-LV, mes root, liver motility, stomach - lesser curvature    Neuromuscular Re-education      Therapeutic Activity     Therapeutic Exercises     Gait training     Modality__________________                Total 55    Blank areas are intentional and mean the treatment did not include these items.       Supriya Salvador  2/16/2017  [facility] Discharge Summary  Patient Name: Sandy Spencer  Date: 3/30/2017  Referral Diagnosis: Pain  Referring provider: Nikunj Lynn*  Visit Diagnosis:   1. Abdominal pain, unspecified location     2. Left-sided low back pain without sciatica, unspecified chronicity     3. History of surgery     4. History of cholecystectomy         Goals:  Pt. will demonstrate/verbalize independence in self-management of condition in : 12 weeks  Pt. will be independent with home exercise program in : 12 weeks  Pt. will report decreased intensity, frequency of : Pain;in other weeks;Comment  Other Weeks:: 16 weeks  Comment:: 20% decrease in pain  Pt. will have improved quality of sleep: waking less times/night;in other weeks;Comment  In Other Weeks: 16 weeks  Comment:: sleeping 4-6 hours/night 50-75% of the time  Patient will show increased : tolerance for ___;in 12 weeks  Patient will show increased tolerance for : daily activities    Patient was seen for 4 visits from 2/2/17 to 2/16/17 with 6 missed appointments.  The patient discontinued therapy, did not return.   She discontinued PT due to symptoms and was having additional testing done.     Therapy will be discontinued at this time.  The patient will need a new referral to resume.    Thank you for your referral.  Supriya Salvador  3/30/2017  4:24 PM

## 2021-06-08 NOTE — TELEPHONE ENCOUNTER
ANTICOAGULATION  MANAGEMENT    Assessment     Today's INR result of 1.7 is Subtherapeutic (goal INR of 2.0-3.0)        Warfarin taken as previously instructed    No new diet changes affecting INR    No new medication/supplements affecting INR    Continues to tolerate warfarin with no reported s/s of bleeding or thromboembolism     Previous INR was Subtherapeutic    Plan:     Spoke with Sandy regarding INR result and instructed:     Warfarin Dosing Instructions:  Change warfarin dose to 5 mg daily on Sundays and Thursdays; and 7.5 mg daily rest of week  (5.6 % change)    Instructed patient to follow up no later than: 2 weeks     Education provided: importance of taking warfarin as instructed    Sandy verbalizes understanding and agrees to warfarin dosing plan. Patient read back instructions     Instructed to call the [facility] for any changes, questions or concerns. (#[phone])   ?   Angie Rice RN    Subjective/Objective:      Sandy ELSIE Spencer, a 79 y.o. female is on warfarin.     Sandy reports:     Home warfarin dose: verbally confirmed home dose with Sandy  and updated on anticoagulation calendar     Missed doses: No     Medication changes:  No     S/S of bleeding or thromboembolism:  No     New Injury or illness:  No     Changes in diet or alcohol consumption:  No     Upcoming surgery, procedure or cardioversion:  No, will inform [facility] when lithotripsy has been rescheduled     Anticoagulation Episode Summary     Current INR goal:   2.0-3.0   TTR:   35.2 % (1 y)   Next INR check:   6/5/2020   INR from last check:   1.70! (5/22/2020)   Weekly max warfarin dose:      Target end date:      INR check location:      Preferred lab:      Send INR reminders to:   [facility]    Indications    Other chronic pulmonary embolism without acute cor pulmonale (H) [I27.82]           Comments:            Anticoagulation Care Providers     Provider Role Specialty Phone number    Caitlyn Rees MD  [facility] [phone]

## 2021-06-08 NOTE — TELEPHONE ENCOUNTER
Patient reports she just got a new home monitor from [employer]. These results have not come in yet, writer to obtain prior to providing instructions. Patient reports she has dosed her warfarin today already.     Angie Rice RN

## 2021-06-08 NOTE — TELEPHONE ENCOUNTER
2020 Prior Authorization Request  Who's requesting PA: Pharmacy  Provider: Shane  Pharmacy Name, Location & Phone # : [facility]/[phone]  Medication Name: methocarbamol  Quantity: 120  Days Supply: 30  Insurance Plan: [employer]  Insurance Member ID# : [id number]  CoverMyMeds Key: [id number]  Informed patient that prior authorizations can take up to 10 business days for response: YES  Okay to leave a detailed message: YES

## 2021-06-08 NOTE — PROGRESS NOTES
[facility] Certification Request    February 2, 2017      Patient: Sandy Spencer  MR Number: [id number]  YOB: 1940  Date of Visit: 2/2/2017      Dear Dr. Lynn:    Thank you for this referral.   We are seeing Sandy Spencer for Physical Therapy of pain.    Medicare and/or Medicaid requires physician review and approval of the treatment plan. Please review the plan of care and verify that you agree with the therapy plan of care by co-signing this note.      Plan of Care  Authorization / Certification Start Date: 02/02/17  Authorization / Certification End Date: 05/03/17  Authorization / Certification Number of Visits: [facility]  Communication with: Referral Source  Patient Related Instruction: Nature of Condition;Treatment plan and rationale;Self Care instruction;Basis of treatment;Next steps  Times per Week: 1  Number of Visits: up to 12  Discharge Planning: self management  Therapeutic Exercise: Stretching;Strengthening  Neuromuscular Reeducation: TNE  Manual Therapy: myofascial release;strain counterstrain;visceral manipulation    Goals:  Pt. will demonstrate/verbalize independence in self-management of condition in : 12 weeks  Pt. will be independent with home exercise program in : 12 weeks  Pt. will report decreased intensity, frequency of : Pain;in other weeks;Comment  Other Weeks:: 16 weeks  Comment:: 20% decrease in pain  Pt. will have improved quality of sleep: waking less times/night;in other weeks;Comment  In Other Weeks: 16 weeks  Comment:: sleeping 4-6 hours/night 50-75% of the time  Patient will show increased : tolerance for ___;in 12 weeks  Patient will show increased tolerance for : daily activities      If you have any questions or concerns, please don't hesitate to call.    Sincerely,      Supriya Salvador, PT        Physician recommendation:     ___ Follow therapist's recommendation        ___ Modify therapy      *Physician co-signature indicates they certify the need  for these services furnished within this plan and while under their care.    [facility]   Visceral Initial Evaluation    Patient Name: Sandy Spencer  Date of evaluation: 2/2/2017   Visit #1  Referral Diagnosis: Pain  Referring provider: Nikunj Lynn*  Visit Diagnosis:     ICD-10-CM    1. Abdominal pain, unspecified location R10.9    2. Left-sided low back pain without sciatica, unspecified chronicity M54.5    3. History of surgery Z98.890    4. History of cholecystectomy Z90.49        Assessment:      Pt. is appropriate for skilled PT intervention as outlined in the Plan of Care (POC).  Pt. is a good candidate for skilled PT services to improve pain levels and function.    Goals:  Pt. will demonstrate/verbalize independence in self-management of condition in : 12 weeks  Pt. will be independent with home exercise program in : 12 weeks  Pt. will report decreased intensity, frequency of : Pain;in other weeks;Comment  Other Weeks:: 16 weeks  Comment:: 20% decrease in pain  Pt. will have improved quality of sleep: waking less times/night;in other weeks;Comment  In Other Weeks: 16 weeks  Comment:: sleeping 4-6 hours/night 50-75% of the time  Patient will show increased : tolerance for ___;in 12 weeks  Patient will show increased tolerance for : daily activities    Patient's expectations/goals are realistic.    Chronicity of the problem.  Co-morbidities or other medical factors.  complex medical history       Plan / Patient Instructions:        Plan of Care:   Authorization / Certification Start Date: 02/02/17  Authorization / Certification End Date: 05/03/17  Authorization / Certification Number of Visits: [facility]  Communication with: Referral Source  Patient Related Instruction: Nature of Condition;Treatment plan and rationale;Self Care instruction;Basis of treatment;Next steps  Times per Week: 1  Number of Visits: up to 12  Discharge Planning: self management  Therapeutic Exercise:  Stretching;Strengthening  Neuromuscular Reeducation: TNE  Manual Therapy: myofascial release;strain counterstrain;visceral manipulation    Plan for next visit: initiate manual therapy, continued patient education     Subjective:         Social information:   Living Situation:condo and lives alone   Occupation:retired   Work Status:NA   Equipment Available: None    History of Present Illness:    Sandy is a 76 y.o. female who presents to therapy today with complaints of (L) low back pain, L>R abdominal pain . Reports diarrhea, constipation.  Denies bowel incontinence. She reports she's had 14 surgeries and a number of hospitalizations over the years. Reports having colon removed in , no longer has asc/transv/desc colon. States ilieum is connected to sigmoid colon. States she had reaction to the staples and had complications. She has had problems since then. Hx of endometriosis, several laparotomies, hysterectomy, gall bladder, appendectomy.  (B) lumbar laminectomy in 2016 due to back pain with no change in sx . Had bile duct stent placed in , which relieved some of her sx. Was vomiting or passing bile until a few months ago.  Date of onset/duration of symptoms is .. Onset was gradual. Symptoms are constant. She reports a chronic  history of similar symptoms. She describes their previous level of function as limited with daily activities.   Has stage 3 kidney disease, gastropathy, FMS, RSD in legs/arm.     Pain Ratin  Pain rating at best: 2  Pain rating at worst: 10  Pain description: pain    Functional limitations are described as occurring with:   lifting  performing routine daily activities  sitting 30 min  standing 30 min  walking 30 min  sleeps 0-6 hr/night       Objective:      Posture: (L) PSIS ant, crests level,   Mod decreased lumbar lordosis      Patient Outcome Measures :    Modified Oswestry Low Back Pain Disablity Questionnaire  in %: 56   Scores range from 0-100%, where a score of 0%  represents minimal pain and maximal function. The minimal clinically important difference is a score reduction of 12%.      Lumbar ROM:  Date: 2/2/17     *Indicate scale AROM AROM AROM   Lumbar Flexion 80%     Lumbar Extension WFL      Right Left Right Left Right Left   Lumbar Sidebending 80% 65-70%       Lumbar Rotation         Thoracic Flexion      Thoracic Extension      Thoracic Sidebending         Thoracic Rotation               Visceral Evaluation:    Palpation: SCS points -  L>R JEJ-V, (B) BL-V, (B) MUL-V, BSPH-V, (B) DA-V, DJ-V, ICV-V, PYL-V, TIMA-V, CBD-V     Sphincters assessed with SCS tender points    Abdomen  Stomach: Motility moderately decreased   Spleen: Motility minimally decreased   Pancreas: Motility WNL  Liver: Motility minimally decreased   Bile Duct: Motility moderately decreased   Duodenum: Motility moderately decreased   Small intestine: Motility moderately decreased   Sigmoid colon: Motility minimally decreased   L kidney: Motility moderately decreased   R kidney: Motility moderately decreased     Pelvis  Bladder: Motility moderately decreased     Treatment Today     TREATMENT MINUTES COMMENTS   Evaluation 45    Self-care/ Home management     Manual therapy     Neuromuscular Re-education     Therapeutic Activity     Therapeutic Exercises 10 Discussed findings, plan of care, anatomy, discussed fascial counterstrain, visceral manipulation   Gait training     Modality__________________                Total 55    Blank areas are intentional and mean the treatment did not include these items.     PT Evaluation Code: (Please list factors)  Patient History/Comorbidities: 3+  Examination: 4  Clinical Presentation: unstable  Clinical Decision Making: high    Patient History/  Comorbidities Examination  (body structures and functions, activity limitations, and/or participation restrictions) Clinical Presentation Clinical Decision Making (Complexity)   No documented Comorbidities or personal factors 1-2  Elements Stable and/or uncomplicated Low   1-2 documented comorbidities or personal factor 3 Elements Evolving clinical presentation with changing characteristics Moderate   3-4 documented comorbidities or personal factors 4 or more Unstable and unpredictable High              Supriya Salvador  2/2/2017  2:10 PM

## 2021-06-08 NOTE — TELEPHONE ENCOUNTER
His note says that she was on it for 2.5 years, went off and then went back on again?  We can restart it for sure because she didn't have any problems with it.

## 2021-06-08 NOTE — TELEPHONE ENCOUNTER
Patient is on the schedule 6/5/20 for INR/labs/urine sample. Her appointment notes state that she needs to be placed in a separate room. As she is >14 days since her positive test, is this still the case?

## 2021-06-08 NOTE — TELEPHONE ENCOUNTER
Medication Question or Clarification  Who is calling: pharmacist  What medication are you calling about (include dose and sig)?:   SUMAtriptan (IMITREX) 50 MG tablet  50 mg, Oral, Every 2 hours PRN         Summary: Take 1 tablet (50 mg total) by mouth every 2 (two) hours as needed for migraine., Starting Sun 3/3/2019, Print      Who prescribed the medication?: Dr Grier  What is your question/concern?: The patient is calling pharmacy already for this medication and order is not even pended yet. Per pharmacist please send ASAP as patient is an issue.   Requested Pharmacy: [facility]var[facility] Wooday to leave a detailed message?: Yes

## 2021-06-08 NOTE — TELEPHONE ENCOUNTER
Pt happy with results and will stay on medication. Will follow up in early November.    Followed protocol
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
## 2021-06-08 NOTE — TELEPHONE ENCOUNTER
Pt notified. She was reminded of information from Dr perkins below regarding two dose per day.  She stats that she's feeling more chest congestion today.

## 2021-06-08 NOTE — PROGRESS NOTES
ASSESSMENT/PLAN:       1. Anxiety and depression  -Given her history of depression and anxiety as well as this questionable diagnosis of bipolar disorder and schizoaffective disorder I'm referring her to mental health for further evaluation, assessment and hopefully some diagnosis clarification.  For now continue to monitor and follow up here in 2-3 months.  - Ambulatory referral to Psychology    2. Hypertension  -Blood pressure is elevated today, continue to monitor and consider increasing or adding medications in the next few months if she continues to have issues.    3. Localized swelling of lower leg  -Swelling is stable.  She does have a prescription for compression stockings which are really fairly expensive for her.  We spent some time discussing the [employer] catalog, I did measure her legs for her and wrote these measurements down.    4. Elevated lipase  -Doing significantly better following stenting of her pancreatic duct.  Continue to monitor, follow up with GI as indicated and at her next visit if she hasn't had an x-ray she does need an abdominal x-ray per the discharge summary.    35 minutes was spent face-to-face with the patient during this encounter and >50% of this was spent on counseling and coordination of care. We discussed in depth the topics listed above.         Caitlyn Rees MD      PROGRESS NOTE   1/6/2017    SUBJECTIVE:  Sandy Spencer is a 76 y.o. female  who presents for follow up.     She had a stent placed in her pancreatic duct. She was told that she had a 5mm stone but the ducts was apparently quite scarred down. Since then she has been doing a bit better. The severe pain that she was having has started improving.  She does still have some abdominal pain but the significant 9 aching pain that she was having in her back has resolved.  She is frustrated that she was not getting the care that she felt she needed at the hospital in regards to an RSD flare that she was  having.  She was having a flare of her RSD when she was in the hospital. She was able to relax with an aide putting some lotion on her legs.     She continues to have some issues with her blood pressure which he thinks is secondary to her RSD.  She is on verapamil as well as furosemide.    She received an AVS from the hospital which had a diagnosis of bipolar disorder and schizoaffective disorder. She wants to know where this is coming from.  We performed a chart review and this showed that she was in [facility] in August 2014 in the mental health unit with significant altered mood.  This appears to be when she was diagnosed with this.  Per the discharge note she has never referred anywhere else for care necessarily.  She then moved to [place] shortly after that.  She would like to be evaluated for this to prove that this is not true.  She is concerned because she feels her daughter is trying to get power of  from her and she would like to prove that her mental status is appropriate and she does not have bipolar disorder.      Chief Complaint   Patient presents with     Follow-up      follow up  hospital stay for stent placement          Patient Active Problem List   Diagnosis     Nausea & vomiting     Chronic kidney disease (CKD) stage G3a/A1, moderately decreased glomerular filtration rate (GFR) between 45-59 mL/min/1.73 square meter and albuminuria creatinine ratio less than 30 mg/g     Focal glomerular sclerosis     Anxiety and depression     RSD lower limb, bilateral     ADD (attention deficit disorder)     Chronic pain     RSD upper limb, right     Other specified hypothyroidism     Anxiety     Osteopenia     Major depression     Hypertension     Insomnia     Mixed hyperlipidemia     Right rotator cuff tear     Cluster headaches     Tachycardia     Hypoxemia     Lumbar radiculopathy     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     Stenosis of lateral recess of lumbar spine     Reflex sympathetic  dystrophy     Acute encephalopathy     Temporomandibular joint-pain-dysfunction syndrome (TMJ)     Pancreatitis     Localized swelling of lower leg     Medical cannabis use     Acute right-sided low back pain without sciatica     Acute exacerbation of chronic low back pain       Current Outpatient Prescriptions   Medication Sig Dispense Refill     atorvastatin (LIPITOR) 80 MG tablet Take 1 tablet (80 mg total) by mouth bedtime. 90 tablet 3     azelastine (ASTEPRO) 0.15 % (205.5 mcg) Spry nasal spray Apply 2 sprays into each nostril daily as needed. 30 mL 11     citalopram (CELEXA) 10 MG tablet Take 1 tablet (10 mg total) by mouth daily. 90 tablet 1     colestipol (COLESTID) 1 gram tablet Take 2 g by mouth 2 (two) times a day as needed.       diazePAM (VALIUM) 5 MG tablet Take 1 tablet (5 mg total) by mouth 2 (two) times a day. (Patient taking differently: Take 5 mg by mouth every 12 (twelve) hours as needed. ) 60 tablet 0     diphenhydrAMINE (BENADRYL) 25 mg capsule Take 25 mg by mouth every 6 (six) hours as needed.        fentaNYL (DURAGESIC) 25 mcg/hr Place 1 patch on the skin every third day. 4 patch 0     fluocinolone acetonide oil (DERMOTIC) 0.01 % Drop Instill one drop 1-2 times a week as needed for itching 20 mL 0     furosemide (LASIX) 40 MG tablet Take 0.5-1 tablets (20-40 mg total) by mouth daily as needed. 90 tablet 1     gabapentin (NEURONTIN) 300 MG capsule   1     levothyroxine (SYNTHROID, LEVOTHROID) 50 MCG tablet TAKE 1 TABLET BY MOUTH DAILY 90 tablet 0     lidocaine (LIDODERM) 5 % Place 1 patch on the skin daily as needed. Remove & Discard patch within 12 hours or as directed by MD 10 patch 11     mometasone (ELOCON) 0.1 % cream Apply 1 application topically 2 (two) times a day as needed (ears).       omeprazole (PRILOSEC) 40 MG capsule Take 1 capsule (40 mg total) by mouth daily. 90 capsule 3     SUMAtriptan (IMITREX) 50 MG tablet Take 1 tablet (50 mg total) by mouth every 2 (two) hours as needed  for migraine. 27 tablet 3     traZODone (DESYREL) 100 MG tablet Take 2-4 tablets (200-400 mg total) by mouth bedtime. 60 tablet 1     verapamil (CALAN-SR) 240 MG CR tablet Take 1 tablet (240 mg total) by mouth bedtime. 30 tablet 0     No current facility-administered medications for this visit.        History   Smoking Status     Former Smoker     Packs/day: 1.00     Years: 20.00     Quit date: 1/1/2000   Smokeless Tobacco     Never Used           OBJECTIVE:        No results found for this or any previous visit (from the past 240 hour(s)).    Vitals:    01/06/17 1357 01/06/17 1417   BP: 142/80 164/80   Pulse: 84      Weight: 170 lb 12.8 oz (77.5 kg)        Physical Exam:  GENERAL APPEARANCE: A&A, NAD, well hydrated, well nourished  SKIN:  Normal skin turgor, no lesions/rashes   HEENT: moist mucous membranes, no rhinorrhea  NECK: Normal  CV: RRR, no M/G/R   LUNGS: CTAB  ABDOMEN: Soft, mild epigastric tenderness to palpation, slightly hyperactive bowel sounds, no guarding or rebound   EXTREMITY: no edema   NEURO: no gross deficits   Psych: Her affect is mildly anxious, she makes normal eye contact, her thought process and speech pattern are normal, no obvious hallucinations, mildly elevated PHQ 9 and KT 7 today  MSK: circumferences of bilateral legs for compression socks.   Ankle 10.5 inches  Calf 16 inches  Thigh 22.5 inches  Calf length 15 inches

## 2021-06-08 NOTE — TELEPHONE ENCOUNTER
Pt. Calls with severe headache.  She does have a history of migraine headaches and has used imitrex with some help.  She has been diagnosed with COVID.  She has only had the headache with not trouble breathing.  She has been in contact with her primary physician for this.  She is on a Duragesic patch 75 mcg/hr.  She will try one of her imitrex tablets tonight.  She will call back in the morning if this does not give her enough relief.

## 2021-06-08 NOTE — PATIENT INSTRUCTIONS - HE
Patient Stated Goal: Prevent further stones  Symptom Control While Passing A Stone    The goal of [facility] is to let a smaller kidney stone (less than 4 to 5 mm) pass without intervention if possible. Giving your body a chance to clear the stone may take a few hours up to a few weeks.  Keeping you well-informed, safe and fairly comfortable is important.    Drink to thirst  Do not attempt to  flush out  your stone by drinking too much fluid. This does not work and may increase nausea. Drink enough to satisfy your body s thirst. Eating your normal diet is fine.   Medications (that may be suggested or prescribed)    Ibuprofen (Advil or Motrin) Available over the counter  o Take two (200mg) tablets every six hours until the stone passes.  o Prevents spasm of the ureter.    o Decreases pain.      Dramamine* (drowsy version, non-generic formulation) Available over the counter  o Take 50mg at bedtime  o Decreases spasms of the ureter  o Decreases nausea  o Decreases acute pain  o Decreases recurrence of pain for 24 hours  o Will help you sleep  *This medication will cause increase drowsiness, do not drive or operate machinery for 6 hours.      Narcotics (Percocet, Vicodin, Dilaudid) Take as prescribed for severe pain unrelieved by ibuprofen and Dramamine  o Narcotics have significant side effects and only  cover-up  pain. They have no effect on the cause of pain.  o Common side effects  - Confusion, disorientation and sedation - DO NOT DRIVE OR OPERATE MACHINERY WITHIN 24 HOURS  - Nausea - take Dramamine or Zofran or Haldol to help control  - Constipation  - Sleep disturbances      Ondansetron (Zofran) Take as prescribed  o Reserve for severe nausea  o May cause constipation, start over the counter Miralax if needed      Second Line Anti-Nausea Medication: Adding a different anti-nausea medication maybe helpful for persistent nausea.  The combined effect of different types of anti-nausea medications maybe  more effective than either medication by itself, even in higher doses.  o Compazine: Take as prescribed      Information about kidney stones    Crystals can form if chemicals are too concentrated in your urine. If the crystal grows over time, a stone may form. A stone usually isn t painful while it is still in the kidney.    As the stone begins to leave the kidney, you may experience episodes of flank pain as the kidney stone approaches the entrance to the ureter. Some people feel a vague ache in the side.    Kidney stones may fall into the ureter. Some stones are tiny and pass through without causing symptoms. The ureter is a small tube (approximately 1/8 of an inch wide). A kidney stone can get stuck and block the ureter. If this happens, urine backs up and flows back to the kidney. Back pressure on the kidney can cause:  o Severe flank pain radiating to the groin.  o Severe nausea and vomiting.  o The pain can occur in the lower back, side, groin or all three.      When the stone reaches the lower ureter, this can irritate the bladder and sensations of feeling the urge to urinate frequently and urgently may occur.      Once the stone passes out of your ureter and into your bladder, the symptoms of urgency and frequency will often disappear. Sometimes pain will come back for a short period and will not be as severe as before. The passage of the stone from your bladder and out of your body is usually not a problem. The urethra is at least twice as wide as the normal ureter, so the stone doesn t usually block it.    Strain all urine  If you pass the stone, save it. Place it in the container we have provided and bring it to the [facility] within a week of passing it. Your stone will then be sent for analysis which takes about a month.     Signs and symptoms you might experience    Nausea    Decreased appetite    Urinary frequency    Bloody urine     Chills    Fatigue    When to call [facility]  or go to the Emergency Room    Fever with a temperature greater than 100.1    Severe pain    Persistent nausea/vomiting    If the pain worsens or nausea/vomiting is uncontrolled with medications, STOP eating & drinking. You need to have an empty stomach for 8 hours prior to surgery. Call the [facility] immediately at [phone].           Follow-up    Low dose CT scan with doctor visit 1-2 weeks after initial clinic visit per doctor s instructions    Please cancel the CT scan visit if you pass a stone. Reschedule for a one month follow-up with doctor to discuss stone composition and future prevention.    Preventing future stones    Approximately a month after your stone is sent out for analysis, a prevention visit will occur with your provider, to discuss an individualized plan for prevention of new stones and to discuss managing stones that you may still have. Along with the analysis of the kidney stone, blood and urine tests may be indicated to develop this plan. Knowing the type of kidney stones you make, and why, allows the providers at the [facility] to recommend specific ways to prevent them.    Follow-up visits are an important part of monitoring and preventing future re-occurrences.    The [facility] is available for questions or concerns 24 hours a day at [phone]

## 2021-06-08 NOTE — TELEPHONE ENCOUNTER
Central PA team  [phone]  Pool: EMIGDIO PA MED ([id number])          PA has been initiated.       PA form completed and faxed insurance via Cover My Meds     Key:   [id number]     Medication:  METHOCARBAMOL    Insurance:  [employer] PART D        Response will be received via fax and may take up to 5-10 business days depending on plan

## 2021-06-08 NOTE — TELEPHONE ENCOUNTER
Coronavirus (COVID-19) Notification    Reason for call  Notify of POSITIVE  COVID-19 lab result, assess symptoms,  review [facility] recommendations    Lab Result   Lab test for 2019-nCoV rRt-PCR or SARS-COV-2 PCR  Oropharyngeal AND/OR nasopharyngeal swabs were POSITIVE for 2019-nCoV RNA [OR] SARS-COV-2 RNA (COVID-19) RNA     We have been unable to reach Patient by phone at this time to notify of their Positive COVID-19 result.  Left voicemail message requesting a call back between 8 am to 6:30 p.m. to [phone] [facility] for results.   (Weekends, this line is available from 10A to 6:30P)     POSITIVE COVID-19 Letter sent.    Patient was contacted by her PCP, Dr Rees today at 3:05PM.  See Dr Rees's telephone note    [Name]  Dante Can RN  [employer] [facility] - [facility]  Emergency Dept Lab Result RN  # [phone]

## 2021-06-08 NOTE — TELEPHONE ENCOUNTER
Who is calling:  Sandy  Reason for Call:  Patient called regarding her INR, she checked her INR today & had a result of 1.7. She stated she has an appointment today at 1pm & would like a call back before that, if possible.  Date of last appointment with primary care: 5/5/2020  Okay to leave a detailed message: Yes

## 2021-06-08 NOTE — TELEPHONE ENCOUNTER
ANTICOAGULATION  MANAGEMENT    Assessment     Today's INR result of 2.0 is Therapeutic (goal INR of 2.0-3.0)        Warfarin taken as previously instructed    No new diet changes affecting INR    No interaction expected between teriparatide, topamax  and warfarin    Per micromedex monitor INR closely with pamelor      Gabapentin and lactulose discontinued     Continues to tolerate warfarin with no reported s/s of bleeding or thromboembolism     Previous INR was Subtherapeutic    Plan:     Spoke with Sandy regarding INR result and instructed:     Warfarin Dosing Instructions:  Continue current warfarin dose 5 mg daily on Thursdays; and 7.5 mg daily rest of week  (0 % change)    Instructed patient to follow up no later than: 2 weeks with [employer] home monitoring     Education provided: importance of therapeutic range, target INR goal and significance of current INR result and importance of taking warfarin as instructed    Sandy verbalizes understanding and agrees to warfarin dosing plan.    Instructed to call the [facility] for any changes, questions or concerns. (#[phone])   ?   Angie Rice RN    Subjective/Objective:      Sandy Spencer, a 79 y.o. female is on warfarin.     Sandy reports:     Home warfarin dose: verbally confirmed home dose with Sandy and updated on anticoagulation calendar     Missed doses: No     Medication changes:  Yes: teriparatide, topamax (dose change), pamelor, and discontinue gabapentin and lactulose     S/S of bleeding or thromboembolism:  No     New Injury or illness:  No     Changes in diet or alcohol consumption:  No     Upcoming surgery, procedure or cardioversion:  No    Anticoagulation Episode Summary     Current INR goal:   2.0-3.0   TTR:   35.3 % (1 y)   Next INR check:   6/23/2020   INR from last check:   2.00 (6/9/2020)   Weekly max warfarin dose:      Target end date:      INR check location:      Preferred lab:      Send INR reminders to:   [facility]     Indications    Other chronic pulmonary embolism without acute cor pulmonale (H) [I27.82]           Comments:            Anticoagulation Care Providers     Provider Role Specialty Phone number    Caitlyn Rees MD Referring [facility] Medicine [phone]

## 2021-06-08 NOTE — PROGRESS NOTES
[facility] Daily Progress     Patient Name: Sandy Spencer  Date: 2017  Visit #3  Referral Diagnosis: Pain  Referring provider: Nikunj Lynn*  Visit Diagnosis:     ICD-10-CM    1. Abdominal pain, unspecified location R10.9    2. Left-sided low back pain without sciatica, unspecified chronicity M54.5    3. History of surgery Z98.890    4. History of cholecystectomy Z90.49          Assessment:     Patient is appropriate to continue with skilled physical therapy intervention, as indicated by initial plan of care.    Goal Status:  Pt. will demonstrate/verbalize independence in self-management of condition in : 12 weeks  Pt. will be independent with home exercise program in : 12 weeks  Pt. will report decreased intensity, frequency of : Pain;in other weeks;Comment  Other Weeks:: 16 weeks  Comment:: 20% decrease in pain  Pt. will have improved quality of sleep: waking less times/night;in other weeks;Comment  In Other Weeks: 16 weeks  Comment:: sleeping 4-6 hours/night 50-75% of the time  Patient will show increased : tolerance for ___;in 12 weeks  Patient will show increased tolerance for : daily activities    Plan / Patient Education:     Continue with initial plan of care.    Subjective:     Pain Ratin  Had GI specialist yesterday. Will have a scope on Tues to further evaluate. Thinks she has fibroids and that there was scar tissue.  She's planning to go to [place] in March, but not sure if she'll be able to go. Tolerated the last session okay.       Objective:     GL (+) for (R) lat trunk.  Tender pts: (B) post 12th ribs, (L) sup pubis, (R) lower rib tubercles, (R) post pelvis.     Treatment Today     TREATMENT MINUTES COMMENTS   Evaluation     Self-care/ Home management     Manual therapy 55 Induction, indirect, direct techniques utilized as appropriate for optimal tissue release.   MFR/SCS - (R) PGLS-N, (R) PGT8-9-N, (L) MUL-V, pelvic diaphragm  (B) MR-V, (L) BRD-V, supine L5S1 traction    Neuromuscular Re-education     Therapeutic Activity     Therapeutic Exercises     Gait training     Modality__________________                Total 55    Blank areas are intentional and mean the treatment did not include these items.       Supriya Salvador  2/9/2017

## 2021-06-08 NOTE — TELEPHONE ENCOUNTER
ANTICOAGULATION  MANAGEMENT    Assessment     Today's INR result of 1.4 is Subtherapeutic (goal INR of 2.0-3.0)        Warfarin taken as previously instructed    No new diet changes affecting INR    No new medication/supplements affecting INR    Continues to tolerate warfarin with no reported s/s of bleeding or thromboembolism     Previous INR was Subtherapeutic    Plan:     Spoke with Sandy regarding INR result and instructed:     Warfarin Dosing Instructions:  Take warfarin 10 mg tonight,  then change warfarin dose to 5 mg daily on Tuesdays, Thursdays; and 7.5 mg daily rest of week  (12.5 % change)    I plan to change days of warfarin 5 mg to space out, but did not want to push too much warfarin at one time     Instructed patient to follow up no later than: 5/22/2020, although positive for COVD19 her follow up with be 13 days after, no symptoms reported related to this, scheduled as late as possible for patient to have separate room.     Education provided: importance of taking warfarin as instructed    Sandy verbalizes understanding and agrees to warfarin dosing plan. Read back warfarin dosing plan.    Instructed to call the [facility] for any changes, questions or concerns. (#[phone])   ?   Angie Rice RN    Subjective/Objective:      Sandy ZIMMERMAN Tj, a 79 y.o. female is on warfarin.     Sandy reports:     Home warfarin dose: verbally confirmed home dose with Sandy  and updated on anticoagulation calendar     Missed doses: No     Medication changes:  No     S/S of bleeding or thromboembolism:  No     New Injury or illness:  Yes: positive COVID, denies any symptoms     Changes in diet or alcohol consumption:  No     Upcoming surgery, procedure or cardioversion:  No    Anticoagulation Episode Summary     Current INR goal:   2.0-3.0   TTR:   35.2 % (1 y)   Next INR check:   5/22/2020   INR from last check:   1.40! (5/19/2020)   Weekly max warfarin dose:      Target end date:      INR check location:       Preferred lab:      Send INR reminders to:   [facility]    Indications    Other chronic pulmonary embolism without acute cor pulmonale (H) [I27.82]           Comments:            Anticoagulation Care Providers     Provider Role Specialty Phone number    Caitlyn Rees MD Referring Family Medicine [phone]

## 2021-06-08 NOTE — PROGRESS NOTES
Subjective:   Sandy Spencer is a 76 y.o. female who presents for evaluation of pain. Patient was last seen 1/20/2017 Dr. Lynn.        Major issues:  1. Chronic pain syndrome    2. RSD lower limb, bilateral    3. Lumbar radiculopathy        CC: Pain  See rooming evaluation    HPI:   Impact of pain treatments:   Analgesia: fair to poor   ADL's: Work: Retired, she was a , volunteers once a week. Household: lives alone, has 16 steps to climb. Social: lives alone, has 16 steps to climb.   AE's: none   Aberrant behavior: none    Headache:  Frequency of HA: daily  Duration of HA: all day  Quality of HA: depends, pounding   Location of HA: left temporal  Severity of HA: severe  Stimulus for a HA: pain  Aura description: heavyness  #of HA days per mo: (15/mo to consider Botox)  Rehabilitation: none  Interventional: none  Prophylactic treatment: verapamil, citalopram  Abortive Medication for HA: Sumatriptan  Management: strategies pain medications    Aggravating factors: reaching above the head, cant lift more than 3 poulds  Alleviating factors: medications, laying in apsom salt in a tub, has electric blanket that she wraps her leg.  Associated symptoms: increased pain  DME: none  Location/Laterality of the pain: cluster headache, back pain  Timing: constant  Quality: sharp, deep, aching, cold and burning  Severity: 6/10    Activities Impaired by Increasing Pain Severity: F= 8  3-Enjoy  4-Work, Enjoy  5-Active, Mood Work Enjoy  6-Sleep, Active, Mood Work Enjoy  7-Walk, Sleep, Active, Mood Work Enjoy  8-Relate, Walk, Sleep, Active, Mood Work Enjoy      Medication: Patient is taking Oxycodone 10 mg four times a day.     Last opioid dose was Oxycodone last taken 02/17/17 @ 830a.       Interventional: She is interested in injection with Dr. Mittal    Rehabilitation: Feels like it helps temporary and she has not noticed anything significant.     Integrative Approaches: Stay active, home exercise  [facility]: Has a psychiatry that she sees, Tha    Records: Reviewed to prepare for today's visit and reflected throughout the note.    Additional Problems: Increased pain    Diagnostics:   Lab:  Last UDS/SWAB on 10/6/2016.  Images.  CT ABDOMEN PELVIS WO ORAL WO IV CONTRAST  12/10/2016,    INDICATION: Abdomen pain.  TECHNIQUE: Routine CT abdomen and pelvis without oral or IV contrast. Multiplanar reformation images (MPR). Dose reduction techniques were used.  COMPARISON: 11/29/2016     FINDINGS:  LUNG BASES: Atelectasis.     ABDOMEN: The previously seen two tiny stones in the lower pole of the right kidney are no longer present, and there is new mild ureteral pyelocaliectasis. No ureteric stones are present. The patient likely has passed stones via the right ureter. No stone  is seen in the bladder. Cholecystectomy. Stable bile duct dilatation. Liver, spleen, pancreas, adrenal glands, and the left kidney are negative. Partial colectomy.     PELVIS: Hysterectomy. Pelvic phleboliths.     MUSCULOSKELETAL: Narrowing L4 and L5 interspaces with postsurgical changes of the lumbar spine.     IMPRESSION:   CONCLUSION:  1. Previously two tiny stones seen in the lower pole of the right kidney are no longer present, and there is new mild right ureteral pyelocaliectasis. No ureteric stones. Patient likely has passed the two tiny stones. No stone is seen in the bladder.  2. Previous cholecystectomy and hysterectomy. Stable bile duct dilatation.  3. Partial colectomy.  4. Postsurgical changes in the lumbar spine.      Review of Systems pblm pert  Constitutional- + sleep disturbances, + activity intolerance  Musculoskeletal- - pain  Neuro- - cognitive changes, - radicular, + neuropathic symptoms  GI/-  - constipation, - urinary difficulty  Psych-  + mood disorders (anxiety, depression),  - taking medication in a fashion other than prescribed    Objective:     Vitals:    02/17/17 1105   BP: (!) 180/93   Pulse: 72  "  Resp: 16   Weight: 172 lb (78 kg)   Height: 5' 3.5\" (1.613 m)   PainSc:   6       Constitutional:  Pleasant and cooperative female who presents alone today.   Psychiatric: Mood and affect are appropriate for the situation, setting and topic of discussion.  Patient does not appear sedated.  Integumentary:  Observed skin WNL  HEENT: EOM's grossly intact.    Chest: Breathing is non-labored.   Neurological:  Alert and oriented in all spheres including: time, place, person and situation.  Durable Medical Equipment: none    Assessment:   Sandy Spencer is a 76 y.o. female seen in clinic today for chronic pain, RSD and headaches.  She reports that recently she is not sure if her medications are working.  she has been having increased pain especially in her right lower quadrant that radiates to the back.  The pain is making her not be able to function.  Last time she was seen by Dr. Lynn he suggested celiac plexus block for pain management.  The patient is interested to have Dr. Mittal perform the procedure.  She reports that due to pain, her headaches and her blood pressure is high has increased in severity and intensity.  She had colonoscopy done 2/14/17.  She reports that after her colonoscopy her pain subsided and now she feels like the pain is coming back.  She is afraid the pain is affecting he colon and she might get a  Colostomy bag.  She days she feels like she can not socialize in a fashionable manner.  She feel sad most of the time and she does not feel like going out.  With her RSD she reports her right lateral thigh being worst than the left.  It feels cold and heavy most of the time and some times she wraps it in an electric blanket.          Plan:   Plan/NextSteps:   I placed an order for Dr. Mittal to do an injection (Celiac Plexus Block).   Medication:   Medication prescribed today Oxycodone 10 mg four times a day.     REFILL INSTRUCTIONS:  Please contact the clinic refill line 7 days before your " refill is due. Speak clearly; note cell phones cut in-and-out and poor quality speech and reception issues will influence our ability to hear you and be efficient with your prescription.     Call [phone] leave:   Your name (first and last w/ spelling)   Date of birth  Name of all the medication(s) being requested  Dose of the medication(s)   How you are taking the medication (eg. twice per day etc).     Contact your pharmacy 3 (three) days after leaving your message to see if your prescription has been received. Please request the pharmacy check your profile to be certain about any concerns with a script failing to be received. Note: Ashley updates have been inconsistent.  If the script has not been received there may have been a problem with the communication please reach back out to the clinic.       Interventional: Interested in injection with Dr. Mittal.      Rehabilitation: Feels like it helps temporary and she has not noticed anything significant. She will stop for now until after she sees Dr. Mittal.     Integrative Approaches: Stay active, do home exercise [facility]: Has a psychiatry that she sees, [place]    Records: Reviewed to prepare for today's visit and reflected throughout the note.    Health Maintenance: per primary care    Diagnostics: UDS/SWAB collected 1/20/2017  UDS/SWAB:  Patient required a random Urine Drug Screen, due to the need to comply with [employer] Policy Guidelines for the use of any controlled substances. This is to ensure that patient is compliant with treatment, and to diminish diversion, abuse, or any other aberrant behaviors. Patient is either being considered for or taking a controlled substance. Unexpected findings will be discussed and treatment decision may be adjusted.       Records: Reviewed to assist with preparation for the office visit and are reflected throughout the note.    Follow up: 1 month      Education: Please call Monday-Friday for problems  or questions and one of the clinical support staff (CSS) will help to get things figured out. The number is [phone]. Some folks are using [employer] to send and e-mail. Please remember some issues require an office visit.     Reviewed the plan of care, provided justification and answered questions with the patient.     SAFETY REMINDERS  No alcohol while taking controlled substances. Alcohol is not an illegal substance, it is unsafe to use in combination. It is a build up of substances in the body that can be extremely hazardous and may cause respirations to slow to a dangerous rate resulting in hospitalization, brain damage, or death.    Opioid medications have been associated with sharp rise in unintentional overdose and death.  Overdose is a condition characterized by the consumption in excess of a particular drug causing adverse effects. Symptoms of overdose include: breathing slow and shallow, erratic or not at all, pinpoint pupils, hallucinations, confusion, muscle jerks, slack muscles, extreme sleepiness or loss of alertness, awake but not able to talk, face pale or clammy, vomiting, for lighter skinned people, the skin tone turns bluish purple, for darker skinned people, it turns grayish or ashen. If in a situation where overdose is a concern engage the emergency response system (dial 911).    Do not sell, loan, borrow or share your opioid medication with anyone. Deaths have occurred as a result of this practice. It is illegal and patients are being prosecuted.       *Universal Precautions:   UDS/Swab- 10/06/2016   Consent-   Agreement- 4/22/2016  Pharmacy- as documented   - 2/17/2017  Count- n/a  Psychological evaluation n/a  Pharmacogenetic testing- n/a  MME- 60    Management of opioid medication is inherently a moderate to high complex medial interaction based on the risk management required at each contact r/t risks and side effects.    Patient Arrived @ 1044 for a 1100 appointment.     TT: 40 -  min  CT: over half spent in education and counseling as outlined in the plan.      Stacie MAIER FNP-C  [address] [place] [address]  [facility]  [phone]  [phone]

## 2021-06-08 NOTE — PROGRESS NOTES
"Assessment/Plan:     Problem List Items Addressed This Visit     RSD lower limb, bilateral    Relevant Medications    methocarbamoL (ROBAXIN) 500 MG tablet    Lumbar radiculopathy    Relevant Medications    fentaNYL (DURAGESIC) 75 mcg/hr (Start on 7/2/2020)    Chronic pain syndrome - Primary    Relevant Orders    OPS Joint Injection Large Joint Bilateral            No follow-ups on file.    There are no Patient Instructions on file for this visit.      Subjective:       79 y.o. The patient has been notified of the following:      \"We have found that certain health care needs can be provided without the need for a face to face visit.  This service lets us provide the care you need with a phone conversation.       I will have full access to your [facility] medical record during this entire phone call.   I will be taking notes for your medical record.      Since this is like an office visit, we will bill your insurance company for this service.       There are potential benefits and risks of telephone visits (e.g. limits to patient confidentiality) that differ from in-person visits.?  Confidentiality still applies for telephone services, and nobody will record the visit.  It is important to be in a quiet, private space that is free of distractions (including cell phone or other devices) during the visit.??      If during the course of the call I believe a telephone visit is not appropriate, you will not be charged for this service\"     Consent has been obtained for this service by care team member: Yes           Kerry rodriguez had tested positive for the COVID virus.  She learned this as she had an evaluation for kidney surgery.  Noted and had some bad headaches and earache.  Did not have temperature.  She had some musculoskeletal pain which she attributed to her RSD.  She was scheduled evaluation as there is concern whether she has a kidney stone or the stone was representing something else near her ureter, her GFR has " gone up and she has 1 kidney.  She will be rescheduled to assess this.    Reviews problems with getting her fentanyl patch refilled to the pharmacy him without it for 6 days, also mixup with partial fills.  She has been using 75 mcg every 8 hours.    The nephrologist as asked her to review which medications may affect her kidney function.  The Topamax was mentioned.  It is presently been prescribed by her primary care provider.  I would agree however this is a factor.  Also reviewing the record concern about the Voltaren gel.  She applies that topically on her knees.  She would then like to have an injection with Dr. Mittal, not having them for a year.    Reviewed she has also been on a statin, has been on this for some time.  We discussed associated symptoms of fatigue, depression.  She is unsure but they may apply in somebody else's mention Co. Q 10 in the past such as she has the supplement but does not take it.    She asks if she can have some Robaxin again to help with muscle pain if she is simplified her other medications.  Her graph she has been going to physical therapy with appointment today.    She is taking lamotrigine 100 mg twice a day.      Current Outpatient Medications:      calcium carb/vitamin D3/vit K1 (VIACTIV ORAL), Take 1 tablet by mouth daily., Disp: , Rfl:      diclofenac sodium (VOLTAREN) 1 % Gel, Apply 4 g topically 4 (four) times a day. (apply to knees Q AM and Q 2pm and prn.  May self-administer), Disp: 100 g, Rfl: 5     evolocumab 140 mg/mL PnIj, Inject 140 mg under the skin every 14 (fourteen) days., Disp: 6 mL, Rfl: 3     [START ON 7/2/2020] fentaNYL (DURAGESIC) 75 mcg/hr, Place 1 patch on the skin every other day., Disp: 15 patch, Rfl: 0     lamoTRIgine (LAMICTAL) 100 MG tablet, One tablet morning, one-half bedtime, Disp: 135 tablet, Rfl: 3     levothyroxine (LEVO-T) 50 MCG tablet, Take 1 tablet (50 mcg total) by mouth Daily at 6:00 am., Disp: 90 tablet, Rfl: 3     naloxone (NARCAN)  4 mg/actuation nasal spray, 1 spray (4 mg dose) into one nostril for opioid reversal. Call 911. May repeat if no response in 3 minutes., Disp: 1 Box, Rfl: 0     nortriptyline (PAMELOR) 10 MG capsule, Take 3 capsules (30 mg total) by mouth at bedtime., Disp: 90 capsule, Rfl: 2     rosuvastatin (CRESTOR) 40 MG tablet, Take 1 tablet (40 mg total) by mouth at bedtime., Disp: 90 tablet, Rfl: 3     sodium phosphates 133 mL (FLEET ENEMA) 19-7 gram/118 mL Enem rectal enema, Take as directed by the preparation instructions, Disp: , Rfl:      SUMAtriptan (IMITREX) 50 MG tablet, Take 1 tablet (50 mg total) by mouth every 2 (two) hours as needed for migraine., Disp: 9 tablet, Rfl: 1     topiramate (TOPAMAX) 100 MG tablet, Take 1 tablet (100 mg total) by mouth 2 (two) times a day., Disp: 180 tablet, Rfl: 1     traZODone (DESYREL) 100 MG tablet, TAKE 2 TO 4 TABLETS(200  MG) BY MOUTH AT BEDTIME AS NEEDED FOR SLEEP, Disp: 360 tablet, Rfl: 1     warfarin ANTICOAGULANT (COUMADIN/JANTOVEN) 5 MG tablet, Take one to two tablets (5 to 10 mg) by mouth daily. Adjust dose based on INR results as directed., Disp: 180 tablet, Rfl: 3     methocarbamoL (ROBAXIN) 500 MG tablet, Take 1 tablet (500 mg total) by mouth 4 (four) times a day., Disp: 60 tablet, Rfl: 1    Current Facility-Administered Medications:      teriparatide injection 20 mcg (FORTEO), 20 mcg, Subcutaneous, DAILY, Caroline Sumner NP  Telephone sounds alert, clear sensorium.  Thought process tight and logical.  Affect is directed.    Impression: Chronic pain includes multiple generators with osteoarthritis of the knees, lower extremity chronic regional pain syndrome, migraine headaches, lumbar degenerative changes.    Regarding her renal function suggested tapering the Topamax by 50 mg every 6-day.    She will also discontinue the Voltaren gel.    She will review the coenzyme Q 10 strength she has, I recommend adding in 100 mg daily.    We may increase the lamotrigine if  "headaches become worse on tapering the Topamax.    She will be scheduled with Dr. Mittal for Synvisc injections, having them last a year ago.    We will send Robaxin 500 mg 3 times a day.    Total time more than 20 minutes reviewing above and coordinating treatment plan                 Objective:     Vitals:    06/15/20 1552   Weight: 128 lb (58.1 kg)   Height: 5' 2.5\" (1.588 m)   PainSc:   5   PainLoc: Back       Time more than 20 minutes reviewed above and creating treatment plan            This note has been dictated using voice recognition software. Any grammatical or context distortions are unintentional and inherent to the software  "

## 2021-06-08 NOTE — PROGRESS NOTES
"Sandy Spencer is a 79 y.o. female who is being evaluated via a billable telephone visit.      The patient has been notified of following:     \"This telephone visit will be conducted via a call between you and your physician/provider. We have found that certain health care needs can be provided without the need for a physical exam.  This service lets us provide the care you need with a short phone conversation.  If a prescription is necessary we can send it directly to your pharmacy.  If lab work is needed we can place an order for that and you can then stop by our lab to have the test done at a later time.    Telephone visits are billed at different rates depending on your insurance coverage. During this emergency period, for some insurers they may be billed the same as an in-person visit.  Please reach out to your insurance provider with any questions.    If during the course of the call the physician/provider feels a telephone visit is not appropriate, you will not be charged for this service.\"4r    Patient has given verbal consent to a Telephone visit? Yes    What phone number would you like to be contacted at? [phone]    Patient would like to receive their AVS by AVS Preference: Danish.    Additional provider notes:        SUBJECTIVE:  Sandy Spencer is a 79 y.o. female  who presents for follow up.     She did get her home INR machine. She does like this. She does feel that she is doing ok with it. She is frustrated that her INR is not going up. She does wonder if she doesn't absorb. She has been eating muffins with oatmeal, honey, yogurt. She is having a bowel movement on a regular basis, is eating two of these a day. She does have a home monitor now which she is excited about.     Her pain is quite high as well. She went to  her medicine yesterday and the refill wasn't sent through. She was supposed to have the new prescription for yesterday. Last month she was without the patches for six days. " She is changing her patches every two days. She remains frustrated that she requires the pain medication, also is not sure it's helping and ultimately would like to get off of them.     She has had an increase in her headaches as well, and is wondering if she should have her topamax increased. She has been taking 100 mg in the morning and 50 mg at night. She does note some increased issues with her memory however. She also is not sleeping well.     She does have increase her in rsd pain as well, has been using a heating pad on the right and goes into the groin. The color of her leg changes as well due to this. She does have a pt appt today. She did try taking the gabapentin but that caused more issues with her memory. She has been off of that and notes an increase in her nerve pain. She has been on nortriptyline in the past and does not recall any ill effects.     She does have pain over her left eye as well, does wonder if she is having sinus issues. Pressing does relieve her pain, does wonder if she should use her astelin more, seems to relieve it. She did stop the gabapentin, affected her equilibrium, didn't help with the pain.     She also notes that she has had an increase in her urinary frequency, no pain.     Lastly, she feels more depressed due to her pain.   Chief Complaint   Patient presents with     Medication Management     Four week follow up. Patient wonders about having lab work drawn.      Mental Health Problem     Patient has been feeling depressed due to the amount of pain she has been in.      INR Check     Patient wanting to follow up on her INR. Last at home reading on 5/29/20, 1.7.          Patient Active Problem List   Diagnosis     Chronic kidney disease (CKD) stage G3a/A1, moderately decreased glomerular filtration rate (GFR) between 45-59 mL/min/1.73 square meter and albuminuria creatinine ratio less than 30 mg/g (H)     Focal glomerular sclerosis     RSD lower limb, bilateral     ADD  (attention deficit disorder)     RSD upper limb, right     Osteopenia     Major depression     Hypertension     Insomnia     Mixed hyperlipidemia     Right rotator cuff tear     Cluster headaches     Lumbar radiculopathy     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     Temporomandibular joint-pain-dysfunction syndrome (TMJ)     Localized swelling of lower leg     Acquired hypothyroidism     Chronic pain syndrome     Snoring     Abdominal pain, generalized     Dermatochalasis of left eyelid     Bilateral carotid artery stenosis     Coronary artery disease involving native coronary artery of native heart without angina pectoris     Sinus bradycardia     Other chronic pulmonary embolism without acute cor pulmonale (H)     Splenic infarction     Opioid type dependence, continuous (H)     Hematuria     Hydronephrosis     Bladder spasms     Anxiety disorder due to medical condition     Family relationship problem     History of posttraumatic stress disorder (PTSD)     Generalized muscle weakness     Myofascial pain     Moderate major depression (H)     Elevated lipase     Abdominal bloating     Acquired absence of other specified parts of digestive tract     Ampullary stenosis     Obstruction of biliary tree     Anemia     Aphthous ulcer of ileum     Bipolar disorder, unspecified (H)     Disorder of phosphorus metabolism     Edema     Gastroesophageal reflux disease without esophagitis     Obstruction of common bile duct     Overflow diarrhea     History of partial colectomy     Complex regional pain syndrome     Solitary left kidney     Flank pain       Current Outpatient Medications   Medication Sig Dispense Refill     calcium carb/vitamin D3/vit K1 (VIACTIV ORAL) Take 1 tablet by mouth daily.       diclofenac sodium (VOLTAREN) 1 % Gel Apply 4 g topically 4 (four) times a day. (apply to knees Q AM and Q 2pm and prn.  May self-administer) 100 g 5     evolocumab 140 mg/mL PnIj Inject 140 mg under the skin every 14  (fourteen) days. 6 mL 3     lamoTRIgine (LAMICTAL) 100 MG tablet One tablet morning, one-half bedtime 135 tablet 3     levothyroxine (LEVO-T) 50 MCG tablet Take 1 tablet (50 mcg total) by mouth Daily at 6:00 am. 90 tablet 3     rosuvastatin (CRESTOR) 40 MG tablet Take 1 tablet (40 mg total) by mouth at bedtime. 90 tablet 3     SUMAtriptan (IMITREX) 50 MG tablet Take 1 tablet (50 mg total) by mouth every 2 (two) hours as needed for migraine. 9 tablet 1     topiramate (TOPAMAX) 100 MG tablet Take 1 tablet (100 mg total) by mouth 2 (two) times a day. 180 tablet 1     traZODone (DESYREL) 100 MG tablet TAKE 2 TO 4 TABLETS(200  MG) BY MOUTH AT BEDTIME AS NEEDED FOR SLEEP 360 tablet 1     warfarin ANTICOAGULANT (COUMADIN/JANTOVEN) 5 MG tablet Take one to two tablets (5 to 10 mg) by mouth daily. Adjust dose based on INR results as directed. 180 tablet 3     [START ON 2020] fentaNYL (DURAGESIC) 75 mcg/hr Place 1 patch on the skin every other day. 15 patch 0     naloxone (NARCAN) 4 mg/actuation nasal spray 1 spray (4 mg dose) into one nostril for opioid reversal. Call 911. May repeat if no response in 3 minutes. 1 Box 0     nortriptyline (PAMELOR) 10 MG capsule Take 1 capsule (10 mg total) by mouth at bedtime for 5 days, THEN 2 capsules (20 mg total) at bedtime. 60 capsule 2     sodium phosphates 133 mL (FLEET ENEMA) 19-7 gram/118 mL Enem rectal enema Take as directed by the preparation instructions       No current facility-administered medications for this visit.        Social History     Tobacco Use   Smoking Status Former Smoker     Packs/day: 1.00     Years: 20.00     Pack years: 20.00     Last attempt to quit: 2000     Years since quittin.4   Smokeless Tobacco Never Used           OBJECTIVE:        Recent Results (from the past 240 hour(s))   INR   Result Value Ref Range    INR 1.70 (!) 0.9 - 1.1       There were no vitals filed for this visit.  Weight: 134 lb (60.8 kg)         Assessment/Plan:  1.  Chronic cluster headache, not intractable  -Will increase her Topamax to 100 mg two times daily with follow up next week.   - topiramate (TOPAMAX) 100 MG tablet; Take 1 tablet (100 mg total) by mouth 2 (two) times a day.  Dispense: 180 tablet; Refill: 1    2. Moderate major depression (H)  -Feels that her mood is worse as well due to her pain. I'm uncomfortable managing depression medications fully given her history of josé miguel. We have discussed psychiatry referral in the past but she has been hesitant to add another physician into her life. Will need to watch closely, she is on a mood stabilizer and am adding low dose nortriptyline as well.   -Likely would benefit from therapy, again, has been hesitant in the past, will address again with her next visit.     3. History of blood clots  -Discussed that given her issues with labile measurements before, I agree with the slow dose increase for now    4. RSD lower limb, bilateral  -Discussed risks and benefits of low dose nortriptyline, will add small dose and f/u next week to see how she is doing  - nortriptyline (PAMELOR) 10 MG capsule; Take 1 capsule (10 mg total) by mouth at bedtime for 5 days, THEN 2 capsules (20 mg total) at bedtime.  Dispense: 60 capsule; Refill: 2    5. Urinary frequency  -Will obtain a UA when she comes in next week  - Urinalysis Macroscopic; Future  - Culture, Urine; Future  - Basic Metabolic Panel; Future    6. Anemia, unspecified type  -Will update labs next draw.   - HM2(CBC w/o Differential); Future        Phone call duration:  45 minutes    Caitlyn Rees MD

## 2021-06-08 NOTE — TELEPHONE ENCOUNTER
----- Message from [facility] sent at 6/16/2020  3:18 PM CDT -----  Regarding: Amada Pt  General phone call:    Caller: Sandy  Primary cardiologist: Amada    Detailed reason for call: Sandy is calling because her kidney function is decreasing and her other doctors are interested in changing her crestor medication dosage. Please call patient to discuss     Best phone number: [phone]  Best time to contact: anytime   Ok to leave a detailed message? yes  Device? no    Additional Info:

## 2021-06-08 NOTE — TELEPHONE ENCOUNTER
I just spoke with patient regarding not coming into clinic for INR due to her recent positive Covid 19 test results. Patient is concerned as she was told an in-home testing machine will also not be delivered due to her positive results. Please reach out to patient to advise.

## 2021-06-08 NOTE — PROGRESS NOTES
Discussed with patient's PMD Dr. Rees who has a longstanding relationship with patient and will discuss findings and implications.  Will plan to defer procedure for time being in light of high risk for anesthesia related illness with Covid-19.  In the event she starts experiencing greater symptoms suspicious for ureteral stone will intervene as necessary.  Will continue to follow.

## 2021-06-08 NOTE — TELEPHONE ENCOUNTER
I called the pt, she lost a kidney and cannot take fosamax, please advise on an alternative and I will contact the pt back.

## 2021-06-08 NOTE — PATIENT INSTRUCTIONS - HE
PLAN:  Taper Topamax by one half of the 100 mg tablet every 6 the day and discontinue.    Discontinue the Voltaren gel.    Orders placed for Dr. Mittal the knee injections.    We will review your coenzyme Q 10 supplement, may take 100 mg daily as you are on a statin.    Robaxin 500 mg 3 times a day has been sent.    Continue with the fentanyl 75 mcg every 48 hours.    Follow-up with Dr. Lynn in 8 weeks.

## 2021-06-08 NOTE — TELEPHONE ENCOUNTER
Dr Ybarra - pt has rising creatinine, and hematuria, is asking about possibly changing rosuvastatin dose - currently taking 40 mg at bedtime.  What do you recommend?  -lindy

## 2021-06-08 NOTE — TELEPHONE ENCOUNTER
As long as she continues to breathe comfortably, there is no need to go to the ER, make sure you are drinking plenty of fluids, and we'll call tomorrow again.

## 2021-06-08 NOTE — TELEPHONE ENCOUNTER
Called and spoke with the patient, there was a hoarseness in patient's voice on the phone. Inquired patient's symptoms, she did state she feels the hoarseness as well and has chest congestion. No fevers that she is aware of and not coughing. Patient continues to have a headache.     Patient states she took a Imitrex last night around 7 PM and it took away the headache pains for 2 hours. Patient is wondering how often she could take the Imitrex.

## 2021-06-08 NOTE — TELEPHONE ENCOUNTER
ANTICOAGULATION  MANAGEMENT    Sandy ELSIE StoutSpencer received home monitor and has submitted first result.  Reviewed home monitoring program management:      INR testing:    Ordered INR testing frequency is:  Q1-2 weeks    INR should be tested Monday-Friday prior to 2 pm and submitted directly to home monitoring company on day of testing    INR test and monitor review at [facility] required annually for technique observation and quality check.     Venous INR drawn in clinic/lab required if INR > 5.5    Patient will be discharged from home monitoring if they do not meet requirements of home monitoring company or in clinic check.       Management and Communication with AC:    Patient will be called regarding all out of range INRS on the business day result is received for assessment and dosing instructions. If no call received by 4 PM; please contact ACM at: [phone]    [facility] typically does not call patient for therapeutic results. Patient is to continue current warfarin maintenance dose and continue testing INR at ordered frequency.     Patient must call and notify ACM if new medication started, dose missed, s/sx of bleeding or clotting or upcoming procedure    Patient will receive a check in call at least once every 12 weeks if INRs remain in range.    Sandy verbalizes understanding and agrees to INR testing requirements; ACN recommends continued calls for all INR results      Angie Rice RN

## 2021-06-08 NOTE — TELEPHONE ENCOUNTER
ANTICOAGULATION  MANAGEMENT    Assessment     Today's INR result of 1.7 is Subtherapeutic (goal INR of 2.0-3.0)        Warfarin taken as previously instructed    No new diet changes affecting INR    No new medication/supplements affecting INR    Continues to tolerate warfarin with no reported s/s of bleeding or thromboembolism     Previous INR was Subtherapeutic    Plan:     Spoke with Sandy regarding INR result and instructed:     Warfarin Dosing Instructions:  Change warfarin dose to 5 mg daily on Thursdays; and 7.5 mg daily rest of week  (5.3 % change)    Instructed patient to follow up no later than: 1-2 weeks (6/10/2020)     Education provided: importance of therapeutic range, importance of following up for INR monitoring at instructed interval and importance of taking warfarin as instructed    Sandy verbalizes understanding and agrees to warfarin dosing plan.    Instructed to call the [facility] for any changes, questions or concerns. (#[phone])   ?   Angie Rice RN    Subjective/Objective:      Sandy Stoutton, a 79 y.o. female is on warfarin.     Sandy reports:     Home warfarin dose: verbally confirmed home dose with Sandy  and updated on anticoagulation calendar     Missed doses: No     Medication changes:  No     S/S of bleeding or thromboembolism:  No     New Injury or illness:  No     Changes in diet or alcohol consumption:  No     Upcoming surgery, procedure or cardioversion:  No    Anticoagulation Episode Summary     Current INR goal:   2.0-3.0   TTR:   35.3 % (1 y)   Next INR check:   6/12/2020   INR from last check:   1.70! (5/29/2020)   Weekly max warfarin dose:      Target end date:      INR check location:      Preferred lab:      Send INR reminders to:   [facility]    Indications    Other chronic pulmonary embolism without acute cor pulmonale (H) [I27.82]           Comments:            Anticoagulation Care Providers     Provider Role Specialty Phone number    Caitlyn Rees  MD Christine [facility] [phone]

## 2021-06-08 NOTE — TELEPHONE ENCOUNTER
We try to limit the imitrex to as little as possible because it can cause a rebound headache.     She can take it every two hours for two doses

## 2021-06-08 NOTE — TELEPHONE ENCOUNTER
----- Message from Marsha Wade sent at 5/15/2020 11:02 AM CDT -----      Caller: Patient  Primary cardiologist: Dr. Ybarra  Detailed reason for call: Patient has COVID-19 and is concerned about it because of her heart and her half of a lung. Please advise    Best phone number: [phone]  Best time to contact: today  Ok to leave a detailed message? yes  Device? no

## 2021-06-08 NOTE — TELEPHONE ENCOUNTER
Unfortunately patient does need to have this done today or tomorrow, understands that she will need to be roomed immediately on arrival. Unsure if she had symptoms, but no headache since Thursday 5/14 so day 14 would be 5/28, cannot wait that long for next draw.

## 2021-06-08 NOTE — TELEPHONE ENCOUNTER
Spoke with pt and she will call PCP for update and possible meet with pharm to go over all her med.s

## 2021-06-08 NOTE — TELEPHONE ENCOUNTER
Patient calling to check in on refill request. States she was suppose to change the patch on Sunday 5/31/20.

## 2021-06-08 NOTE — PROGRESS NOTES
"[facility] Daily Progress     Patient Name: Sandy Spencer  Date: 2/3/2017  Visit #2  Referral Diagnosis: Pain  Referring provider: Nikunj Lynn*  Visit Diagnosis:     ICD-10-CM    1. Abdominal pain, unspecified location R10.9    2. Left-sided low back pain without sciatica, unspecified chronicity M54.5    3. History of surgery Z98.890    4. History of cholecystectomy Z90.49          Assessment:     Patient is appropriate to continue with skilled physical therapy intervention, as indicated by initial plan of care.    Goal Status:  Pt. will demonstrate/verbalize independence in self-management of condition in : 12 weeks  Pt. will be independent with home exercise program in : 12 weeks  Pt. will report decreased intensity, frequency of : Pain;in other weeks;Comment  Other Weeks:: 16 weeks  Comment:: 20% decrease in pain  Pt. will have improved quality of sleep: waking less times/night;in other weeks;Comment  In Other Weeks: 16 weeks  Comment:: sleeping 4-6 hours/night 50-75% of the time  Patient will show increased : tolerance for ___;in 12 weeks  Patient will show increased tolerance for : daily activities    Plan / Patient Education:     Continue with initial plan of care.    Subjective:     Pain Ratin  \"I just hurt\". Pain is all over. Having back pain. abdomen is \"irritated\", sometimes has sharp pains.      Objective:     GL (+) for central/(L) lower abdomen/pelvis.  Cranial scan is (+) for (L) lower abdominal ant/post viscera.   Mult tender pts in ant/post pelvis/abdomen with associated fascial restrictions in abdominal viscera.    Treatment Today     TREATMENT MINUTES COMMENTS   Evaluation     Self-care/ Home management     Manual therapy 55 Discussed fascial treatment concepts of treating regionally or from a whole body approach as opposed to focusing on localized pain.  Induction, indirect, direct techniques utilized as appropriate for optimal tissue release.   MFR/SCS - CECL-V, (L) " JEJ-V, (L) ILM-V, (L) MR-V, duodenum motility/mobility, ICV-V, mes root. sm intestine mobility, (R) kidney mobility/motility, bile duct ,    Neuromuscular Re-education     Therapeutic Activity     Therapeutic Exercises     Gait training     Modality__________________                Total 55    Blank areas are intentional and mean the treatment did not include these items.       Supriya Salvador  2/3/2017

## 2021-06-08 NOTE — TELEPHONE ENCOUNTER
ANTICOAGULATION  MANAGEMENT    Assessment     Today's INR result of 1.6 is Subtherapeutic (goal INR of 2.0-3.0)        More warfarin taken than instructed which may be affecting INR was instructed to boost 10 mg took 7.5 mg took 7.5 on Friday 5/8/2020     No new diet changes affecting INR    No new medication/supplements affecting INR    Continues to tolerate warfarin with no reported s/s of bleeding or thromboembolism     Previous INR was Subtherapeutic    Plan:     Spoke with Sandy regarding INR result and instructed:     Warfarin Dosing Instructions:  Take 7.5 mg tonight,  then continue current warfarin dose 7.5 mg daily on Wednesdays and Saturddays; and 5 mg daily rest of week  (0 % change)    Instructed patient to follow up no later than: 5/18/2020, if instructed differently after lithotripsy please inform [facility]    Education provided: importance of therapeutic range, importance of following up for INR monitoring at instructed interval, importance of taking warfarin as instructed and Strategies to improve compliance (pillbox, alarm, calendar, etc)    Cydni verbalizes understanding and agrees to warfarin dosing plan.    Instructed to call the [facility] for any changes, questions or concerns. (#[phone])   ?   Angie Rice RN    Subjective/Objective:      Sandy ZIMMERMAN Tj, a 79 y.o. female is on warfarin.     Sandy reports:     Home warfarin dose: template incorrect; verbally confirmed home dose with Sandy  and updated on anticoagulation calendar     Missed doses: No     Medication changes:  No     S/S of bleeding or thromboembolism:  No     New Injury or illness:  No     Changes in diet or alcohol consumption:  No     Upcoming surgery, procedure or cardioversion:  Yes: 5/13/2020, lithotripsy      Anticoagulation Episode Summary     Current INR goal:   2.0-3.0   TTR:   35.2 % (1 y)   Next INR check:   5/18/2020   INR from last check:   1.60! 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

## 2021-06-09 NOTE — TELEPHONE ENCOUNTER
ANTICOAGULATION  MANAGEMENT PROGRAM    Sandy ELSIE Spencer is overdue for INR check.     Left message to call and schedule INR appointment as soon as possible.      Angie Rice RN

## 2021-06-09 NOTE — TELEPHONE ENCOUNTER
Dr Rees is out of the office today so I am covering her messages. It appears that pt has had shingles multiple times in the past and valtrex has been helpful. I will provide refill today and Dr Rees will review message tomorrow and give additional recommendations as she needs.    Dr Roth

## 2021-06-09 NOTE — PATIENT INSTRUCTIONS - HE
Patient Stated Goal: Prevent further stones  INCREASING FLUIDS TO DECREASE RISK    Low Urinary Volume:     Kidney stones form because there is not enough water to dissolve the concentrated chemicals and minerals in urine.     Studies have found that people who make kidney stones should drink enough fluids so that they produce at least 2 liters (2 quarts) of urine per day.     Studies have shown that virtually every fluid you might drink decreases the risk of stone formation. Acceptable fluids include milk, coffee, diet and regular sodas, alcoholic beverages, fruit juices and even plain old water.    Increasing fluid intake will increase urine volume and decrease the chance of kidney stone formation.    Fluids:    Anything liquid that fits in a glass counts.     One way to gauge if your body has enough fluids is to check the color of your urine. If the urine is dark and yellow you do not have enough fluids. Urine will be pale yellow to clear if your body has enough fluids.    What we need to survive:    Most fluid we drink is lost through evaporation, more if active in hot humid environments    Body wastes can be cleared in a small amount of urine    All fluid that is consumed in excess of necessary losses  dilutes the urine    Ways to Increase Fluids Daily:    Drink more fluids throughout the day and into the evening. (You may need to awake from sleep to urinate which is a good indication of volume status)    Keep your refrigerator stocked with beverages you like    Drink a variety of fluids - mix it up    Add another beverage to your regular beverage at every meal    Keep a beverage at your desk (work area) and finish it before you leave for breaks, meetings, lunch and end of day    Use larger volume glasses    Whenever you pass a water fountain - stop and drink    Drink a beverage with snacks, if it is a salty snack, double the beverage    Take medication with a full glass of water/fluid    Keep a bottle of  water in your car and drink every 20 miles    Eat high water content fruits (melons, oranges, grapes, etc.)    Flavor water with a squeeze of lemon or lime or Crystal Light     If you do something repetitive throughout the day, link it with having something to drink    When you give your child/children something to drink, you have something to drink also    [facility] can respond to your questions or concerns 24 hours a day at [phone].      Potassium Citrate    This medication can be used in two ways to help stone formers.      Citrate in the urine makes it harder for stones to form and grow. Many stone patients do not have enough citrate in their urine and require a supplement.        Citrate is an effective way to make the urine less acidic. Uric acid stones only form when the urine is acidic and may dissolve if urine acidity can be reversed.      USES: This medication is used for low urinary citrate (hypocitraturia), renal tubular acidosis, uric acid stones, calcium oxalate stones, and cysteine stones.    HOW TO USE:     Potassium citrate comes in several different forms      Tablet - made of potassium citrate in a wax substance to allow delayed absorption. It is common to see a  ghost tablet  in the stool after all the potassium citrate has been absorbed. This is more common in people with diarrhea or other bowel disorders.      Powder - this can be dissolved in water or other clear fluids. To decrease stomach irritation and improve absorption, we recommend dissolving in at least 500 cc of fluid and drinking slowly. You do not have to drink this medication all at once. Many patients find the taste is improved by adding a sweetener.      Liquid - Similar to the powder, the liquid formulation is best dissolved in a large container of fluid and consumed slowly.     SIDE EFFECTS:  The primary side effect of this medication is stomach irritation, less so with the tablet. Some people find stomach  irritation decreased by taking the medication with food and avoiding lying down for at least a half hour after consumption.    If you develop diarrhea, nausea, vomiting, stomach pain, fluid retention, convulsions, unusual weakness, mental confusion, tingling or numbness of the hands or feet or other potential side effects, notify your doctor or pharmacist promptly.                    *This is a summary and does not contain all possible information about this product. For complete information about this product or your specific health needs, ask your healthcare professional. Always seek the advice of your healthcare professional if you have any questions about this product or your medical condition.

## 2021-06-09 NOTE — TELEPHONE ENCOUNTER
"RN Triage:    History of CAD and kidney disease.    Son just  several days ago.  Pulse rate between  x 24 hours.  Feels weak, but is able to get up to do ADL's.  No chest pain or breathing difficulty.  Is not prescribed any antihypertensives.  Takes warfarin.  No appointments at primary clinic, so she will go to [facility] for evaluation this morning.    Jayla Barnhart RN  [employer]      Reason for Disposition    Age > 60 years    Additional Information    Negative: Passed out (i.e., fainted, collapsed and was not responding)    Negative: Shock suspected (e.g., cold/pale/clammy skin, too weak to stand, low BP, rapid pulse)    Negative: Difficult to awaken or acting confused (e.g., disoriented, slurred speech)    Negative: Visible sweat on face or sweat dripping down face    Negative: Unable to walk, or can only walk with assistance (e.g., requires support)    Negative: Received SHOCK from implantable cardiac defibrillator and has persisting symptoms (i.e., palpitations, lightheadedness)    Negative: Sounds like a life-threatening emergency to the triager    Negative: Chest pain    Negative: Difficulty breathing    Negative: Dizziness, lightheadedness, or weakness    Negative: Heart beating very rapidly (e.g., > 140 / minute) and present now (EXCEPTION: during exercise)    Negative: Heart beating very slowly (e.g., < 50 / minute) (EXCEPTION: athlete)    Negative: New or worsened shortness of breath with activity (dyspnea on exertion)    Negative: Patient sounds very sick or weak to the triager    Negative: Wearing a \"holter monitor\" or \"cardiac event monitor\"    Negative: Received SHOCK from implantable cardiac defibrillator (and now feels well)    Negative: Heart beating very rapidly (e.g., > 140 / minute) and not present now (EXCEPTION: during exercise)    Negative: Skipped or extra beat(s) and increases with exercise or exertion    Negative: Skipped or extra beat(s) and " occurs 4 or more times per minute    Negative: History of heart disease (i.e., heart attack, bypass surgery, angina, angioplasty)    Protocols used: HEART RATE AND HEARTBEAT [id number]
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
## 2021-06-09 NOTE — PROGRESS NOTES
I have called Sandy 3 times over the past two weeks and have been unsuccessfull in reaching this patient for 1 month now.  This patient has not returned any of my messages.  I will continue attempting to reach out to this patient in one month.  I will also check this patient's chart for upcoming appointments, ER reports that may contain a new phone number, or any other recent activity.  I have informed the primary care provider by tasking regarding the lack of successful follow up.

## 2021-06-09 NOTE — TELEPHONE ENCOUNTER
----- Message from Luz Elena Ybarra MD sent at 6/25/2020  4:10 PM CDT -----  Regarding: RE: stop Crestor??  Crestor is metabolized in liver, not kidney.  Crestor does not cause renal injury.  The patient can talk to her nephrologist, too.  Please let me know if any questions.  Thanks  Wilbert      ----- Message -----  From: Perla Rodriguez RN  Sent: 6/25/2020   4:05 PM CDT  To: Luz Elena Ybarra MD  Subject: stop Crestor??                                   Please advise.  ----- Message -----  From: Marsha Wade  Sent: 6/25/2020   3:47 PM CDT  To: Rufina Montenegro RN      Caller: Patient  Primary cardiologist: Dr. Ybarra    Detailed reason for call: Patient stated that she only has one kidney and her pharmacist stated that she needs to be off of I believen rosuvastatin (CRESTOR) 40 MG tablet (it was a bad connection) please advise    Best phone number: [phone]  Best time to contact: today  Ok to leave a detailed message? yes  Device? no

## 2021-06-09 NOTE — TELEPHONE ENCOUNTER
Patient states her shingles have returned and are down by her sciatic nerve. They have not yet erupted but are painful. She is requesting the same prescription from Dr. Rees as she has received in the past. Please call the patient to advise. Thank you.    6/25/2020

## 2021-06-09 NOTE — PROGRESS NOTES
DATE OF SERVICE: 03/03/2017    DATE OF SERVICE:  3/3/2017    Sandy is seen with her friend, Emilia, who is a nurse and a friend from [facility].    She reviews being hospitalized for 5 days with abdominal pain at [facility].  She  finally had a celiac block with Dr. Mittal 2/28/2017, which is significantly  helpful for abdominal pain and her back pain.  She has been told she can return in a  couple weeks.    She notes she is flying to [place] later in March and will be spending time visiting  friends in [place].    She has had problems with her headaches, which she describes as cluster headaches,  increased blood pressure to 212/110.  Reviewed whether this relates to the oxycodone  with an opioid maintained and she notes she does not want to take medications  contributing to this.    She reviews trying medical cannabis, has been told it would cause $2,000 a month to  continue with the agents that she had been using.    Ketamine troches have not been helpful.    She continues with significant complex regional pain syndrome in her left foot and  her right arm since coming off the oxycodone.    She has not been going to physical therapy, feeling that she is not comfortable.    Reviewed that some of the back pain that responded to celiac may be referred pain  from her previous GI procedures.      She notes a trial of baclofen was not well tolerated.    She shows me a letter from her daughter who is terminating their relationship after  some interactions with her granddaughter.    She has been working with a psychotherapist named Natalia Cruz at [facility].  We had been exchanging messages.  She has seen her a few times and  finding that helpful.    Blood pressure 179/101, pulse 74, pain score 8.  She is alert with a clear  sensorium, full range of affect.  Thought process tight and logical.  Affect is  congruent as she reviews stressors.  Her friend appears quite supportive.    IMPRESSION:   Chronic pain relates to migraine headaches, complex regional pain  syndrome, left lower extremity, right upper extremity, more recently abdominal pain  with pancreatitis, reporting celiac injections have helped her back pain noting  there may be a component of referred pain.    She notes significant psychosocial stressors related around her family.    IMPRESSION:  She will contact Dr. Mittal to see if she can have another celiac  block before her trip.    We discussed with visceral referred pain opioids are sometimes not helpful.  Other  neuropathic agents could be considered.  She does recall using Tegretol years ago,  unsure of her response.  Will have a trial of Trileptal.  Usual side effects  discussed.  Will start with 150 mg at bedtime, increasing slowly as tolerated.  She  will call if there are problems before her trip.    Time spent 25 minutes face to face, more than 50% counseling about above condition  and coordination of treatment plan.      MD Indira MCDERMOTT 03/06/2017 19:49:00  T 03/06/2017 22:09:16  R 03/06/2017 22:09:16  [id number]        cc:SANJUANITA ZAFAR MD
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
## 2021-06-09 NOTE — TELEPHONE ENCOUNTER
ANTICOAGULATION  MANAGEMENT    Assessment     Today's INR result of 1.7 is Subtherapeutic (goal INR of 2.0-3.0)        Warfarin taken as previously instructed    No new diet changes affecting INR    No new medication/supplements affecting INR    Continues to tolerate warfarin with no reported s/s of bleeding or thromboembolism     Previous INR was Subtherapeutic    Plan:     Spoke with Sandy regarding INR result and instructed:     Warfarin Dosing Instructions:  Change warfarin dose to 10 mg daily on Mondays, Wednesdays and Fridays; and 7.5 mg daily rest of week  (0 % change)    Instructed patient to follow up no later than: 2 weeks with home monitor (reports was having some trouble with machine, high stress at this time)     Education provided: importance of therapeutic range, importance of following up for INR monitoring at instructed interval and importance of taking warfarin as instructed    Sandy verbalizes understanding and agrees to warfarin dosing plan.    Instructed to call the [facility] for any changes, questions or concerns. (#[phone])   ?   Angie Rice RN    Subjective/Objective:      Sandy Spencer, a 79 y.o. female is on warfarin.     Sandy reports:     Home warfarin dose: verbally confirmed home dose with Sandy  and updated on anticoagulation calendar     Missed doses: No     Medication changes:  No     S/S of bleeding or thromboembolism:  No     New Injury or illness:  No     Changes in diet or alcohol consumption:  No     Upcoming surgery, procedure or cardioversion:  No    Anticoagulation Episode Summary     Current INR goal:   2.0-3.0   TTR:   31.4 % (1 y)   Next INR check:   7/27/2020   INR from last check:   1.70! (7/13/2020)   Weekly max warfarin dose:      Target end date:      INR check location:      Preferred lab:      Send INR reminders to:   [facility]    Indications    Other chronic pulmonary embolism without acute cor pulmonale (H) [I27.82]           Comments:             Anticoagulation Care Providers     Provider Role Specialty Phone number    Caitlyn Rees MD Referring Family Medicine [phone]
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
## 2021-06-09 NOTE — TELEPHONE ENCOUNTER
RN cannot approve Refill Request    RN can NOT refill this medication medication not on med list.      Perla Reyna, [facility] Triage/Med Refill 6/25/2020    Requested Prescriptions   Pending Prescriptions Disp Refills     valACYclovir (VALTREX) 1000 MG tablet [Pharmacy Med Name: VALACYCLOVIR HCL 1GM TABS] 21 tablet 0     Sig: TAKE ONE TABLET BY MOUTH THREE TIMES A DAY FOR 7 DAYS       Antivirals Refill Protocol Passed - 6/25/2020 11:40 AM        Passed - Renal function done in last year     Creatinine   Date Value Ref Range Status   06/08/2020 1.75 (H) 0.60 - 1.10 mg/dL Final             Passed - Visit with PCP or prescribing provider visit in past 12 months or next 3 months     Last office visit with prescriber/PCP: 2/6/2020 Mylene Helton MD OR same dept: 3/11/2020 Adryan William MD OR same specialty: 3/11/2020 Adryan William MD  Last physical: Visit date not found Last MTM visit: Visit date not found   Next visit within 3 mo: Visit date not found  Next physical within 3 mo: Visit date not found  Prescriber OR PCP: Mylene Helton MD  Last diagnosis associated with med order: 1. Herpes zoster with complication  - valACYclovir (VALTREX) 1000 MG tablet [Pharmacy Med Name: VALACYCLOVIR HCL 1GM TABS]; TAKE ONE TABLET BY MOUTH THREE TIMES A DAY FOR 7 DAYS  Dispense: 21 tablet; Refill: 0    If protocol passes may refill for 12 months if within 3 months of last provider visit (or a total of 15 months).

## 2021-06-09 NOTE — TELEPHONE ENCOUNTER
INR result is   1.5    Will the patient be seen, or did they already see, MD or CNP today? No    Most Recent Warfarin dose day/week  Sunday Monday Tuesday Wednesday Thursday Friday Saturday      7.5 5 7.5 7.5     Sunday Monday Tuesday Wednesday Thursday Friday Saturday   7.5 7.5 7.5           Has the patient missed any doses of Coumadin, Warfarin, Jantoven in the past 7 days? No    Has the patients medications changed since the last visit? No    Has the patient experienced any bleeding recently? No    Has the patient experienced any injuries or illness recently? No, had COVID over a month ago, blood in the urine, but can't see it in urine, found by lab test of urine.    Has the patient experienced any 'new' shortness of breath, severe headaches, or changes in vision recently? Yes, the patient has been having a headache everyday since having COVID.    Has the patient had any changes in their diet, or alcohol consumption? No    Is the patient here today to prepare for any type of upcoming surgery, procedure, or for a cardioversion procedure? No    What phone number can we reach the patient at today? [phone]

## 2021-06-09 NOTE — PROGRESS NOTES
"Sandy Spencer is a 79 y.o. female who is being evaluated via a billable telephone visit.      The patient has been notified of following:     \"This telephone visit will be conducted via a call between you and your physician/provider. We have found that certain health care needs can be provided without the need for a physical exam.  This service lets us provide the care you need with a short phone conversation.  If a prescription is necessary we can send it directly to your pharmacy.  If lab work is needed we can place an order for that and you can then stop by our lab to have the test done at a later time.    Telephone visits are billed at different rates depending on your insurance coverage. During this emergency period, for some insurers they may be billed the same as an in-person visit.  Please reach out to your insurance provider with any questions.    If during the course of the call the physician/provider feels a telephone visit is not appropriate, you will not be charged for this service.\"    Patient has given verbal consent to a Telephone visit? Yes    What phone number would you like to be contacted at? [phone]    Patient would like to receive their AVS by AVS Preference: Danish.    Additional provider notes:        SUBJECTIVE:  Sandy Spencer is a 79 y.o. female  who presents for follow up.     She is waiting on getting a urine jug to do a 24 hour urine test. Her PT does worry that she does have something going on in her kidney as well.     She does need to see a urologist as well for her hematuria. She is undergoing workup for her stones through [facility] for now.     She did speak with the pharmacy as well, and she was told that she may want to consider stopping the crestor for concerns of rhabdomyolysis.     Her RSD is flared up as well. She is on the methocarbamol which is helping with the muscles.     She does get a rash with her shingles, is weeping. She does get agitated when she gets it. She has " had the shingles 12 times. She does get this on the left leg. It's going up higher on her back and not going lower on her back.       Chief Complaint   Patient presents with     Follow-up     KIDNEY TESTING HASNT RECIEVED      Medication Problem     CONCERNS          Patient Active Problem List   Diagnosis     Chronic kidney disease (CKD) stage G3a/A1, moderately decreased glomerular filtration rate (GFR) between 45-59 mL/min/1.73 square meter and albuminuria creatinine ratio less than 30 mg/g (H)     Focal glomerular sclerosis     RSD lower limb, bilateral     ADD (attention deficit disorder)     RSD upper limb, right     Osteopenia     Major depression     Hypertension     Insomnia     Mixed hyperlipidemia     Right rotator cuff tear     Cluster headaches     Lumbar radiculopathy     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     Temporomandibular joint-pain-dysfunction syndrome (TMJ)     Localized swelling of lower leg     Acquired hypothyroidism     Chronic pain syndrome     Snoring     Abdominal pain, generalized     Dermatochalasis of left eyelid     Bilateral carotid artery stenosis     Coronary artery disease involving native coronary artery of native heart without angina pectoris     Sinus bradycardia     Other chronic pulmonary embolism without acute cor pulmonale (H)     Splenic infarction     Opioid type dependence, continuous (H)     Hematuria     Hydronephrosis     Bladder spasms     Anxiety disorder due to medical condition     Family relationship problem     History of posttraumatic stress disorder (PTSD)     Generalized muscle weakness     Myofascial pain     Moderate major depression (H)     Elevated lipase     Abdominal bloating     Acquired absence of other specified parts of digestive tract     Ampullary stenosis     Obstruction of biliary tree     Anemia     Aphthous ulcer of ileum     Bipolar disorder, unspecified (H)     Disorder of phosphorus metabolism     Edema     Gastroesophageal  reflux disease without esophagitis     Obstruction of common bile duct     Overflow diarrhea     History of partial colectomy     Complex regional pain syndrome     Solitary left kidney     Flank pain       Current Outpatient Medications   Medication Sig Dispense Refill     calcium carb/vitamin D3/vit K1 (VIACTIV ORAL) Take 1 tablet by mouth daily.       diclofenac sodium (VOLTAREN) 1 % Gel Apply 4 g topically 4 (four) times a day. (apply to knees Q AM and Q 2pm and prn.  May self-administer) 100 g 5     evolocumab 140 mg/mL PnIj Inject 140 mg under the skin every 14 (fourteen) days. 6 mL 3     [START ON 7/2/2020] fentaNYL (DURAGESIC) 75 mcg/hr Place 1 patch on the skin every other day. 15 patch 0     lamoTRIgine (LAMICTAL) 100 MG tablet One tablet morning, one-half bedtime 135 tablet 3     levothyroxine (LEVO-T) 50 MCG tablet Take 1 tablet (50 mcg total) by mouth Daily at 6:00 am. 90 tablet 3     methocarbamoL (ROBAXIN) 500 MG tablet Take 1 tablet (500 mg total) by mouth 4 (four) times a day. 60 tablet 1     nortriptyline (PAMELOR) 10 MG capsule Take 3 capsules (30 mg total) by mouth at bedtime. 90 capsule 2     rosuvastatin (CRESTOR) 40 MG tablet Take 1 tablet (40 mg total) by mouth at bedtime. 90 tablet 3     topiramate (TOPAMAX) 100 MG tablet Take 1 tablet (100 mg total) by mouth 2 (two) times a day. 180 tablet 1     traZODone (DESYREL) 100 MG tablet TAKE 2 TO 4 TABLETS(200  MG) BY MOUTH AT BEDTIME AS NEEDED FOR SLEEP 360 tablet 1     valACYclovir (VALTREX) 1000 MG tablet TAKE ONE TABLET BY MOUTH THREE TIMES A DAY FOR 7 DAYS as needed for outbreaks 42 tablet 2     warfarin ANTICOAGULANT (COUMADIN/JANTOVEN) 5 MG tablet Take one to two tablets (5 to 10 mg) by mouth daily. Adjust dose based on INR results as directed. 180 tablet 3     naloxone (NARCAN) 4 mg/actuation nasal spray 1 spray (4 mg dose) into one nostril for opioid reversal. Call 911. May repeat if no response in 3 minutes. 1 Box 0     sodium  phosphates 133 mL (FLEET ENEMA) 19-7 gram/118 mL Enem rectal enema Take as directed by the preparation instructions       SUMAtriptan (IMITREX) 50 MG tablet Take 1 tablet (50 mg total) by mouth every 2 (two) hours as needed for migraine. 9 tablet 1     Current Facility-Administered Medications   Medication Dose Route Frequency Provider Last Rate Last Dose     teriparatide injection 20 mcg (FORTEO)  20 mcg Subcutaneous DAILY Caroline Sumner NP           Social History     Tobacco Use   Smoking Status Former Smoker     Packs/day: 1.00     Years: 20.00     Pack years: 20.00     Last attempt to quit: 2000     Years since quittin.5   Smokeless Tobacco Never Used           OBJECTIVE:        Recent Results (from the past 240 hour(s))   INR   Result Value Ref Range    INR 1.50 (!) 0.9 - 1.1   Urinalysis Macroscopic   Result Value Ref Range    Color, UA Yellow Colorless, Yellow, Straw, Light Yellow    Clarity, UA Clear Clear    Glucose, UA Negative Negative    Bilirubin, UA Negative Negative    Ketones, UA Negative Negative    Specific Gravity, UA 1.020 1.005 - 1.030    Blood, UA Moderate (!) Negative    pH, UA 7.0 5.0 - 8.0    Protein, UA Negative Negative mg/dL    Urobilinogen, UA 0.2 E.U./dL 0.2 E.U./dL, 1.0 E.U./dL    Nitrite, UA Negative Negative    Leukocytes, UA Negative Negative   Culture, Urine    Specimen: Urine   Result Value Ref Range    Culture No Growth    Basic Metabolic Panel   Result Value Ref Range    Sodium 136 136 - 145 mmol/L    Potassium 4.1 3.5 - 5.0 mmol/L    Chloride 103 98 - 107 mmol/L    CO2 22 22 - 31 mmol/L    Anion Gap, Calculation 11 5 - 18 mmol/L    Glucose 111 70 - 125 mg/dL    Calcium 9.5 8.5 - 10.5 mg/dL    BUN 28 8 - 28 mg/dL    Creatinine 1.35 (H) 0.60 - 1.10 mg/dL    GFR MDRD Af Amer 46 (L) >60 mL/min/1.73m2    GFR MDRD Non Af Amer 38 (L) >60 mL/min/1.73m2   Thyroid Stimulating Hormone (TSH)   Result Value Ref Range    TSH 0.61 0.30 - 5.00 uIU/mL   T4, Free   Result Value Ref  Range    Free T4 1.0 0.7 - 1.8 ng/dL       There were no vitals filed for this visit.  Weight: 128 lb (58.1 kg) (patient reports)         Assessment/Plan:  1. Myalgia  -Discussed that it is unlikely for her to have developed rhabdo as she has been on the Crestor for some time but certainly we can check a CK with her next lab draw to make sure that this is normal.  Her pain is very atypical for this however.  - CK Total; Future    2. Hematuria, unspecified type  -Longstanding hematuria, will refer back to urology for further evaluation and assessment.  She would prefer a female provider so we discussed her options.  - Ambulatory referral to Urology    3. Herpes zoster with complication  -Recurrent symptoms of herpes again.  Given the number of time she has had recurrence I think it would be helpful to swab this 1 time to confirm zoster and not herpes.  We discussed either way that if she continues to have recurrent outbreaks more than once a year we should consider suppressive treatment with Valtrex 500 mg orally daily.  For now I am providing her with a prescription for treatment if she continues to have persistent symptoms.  She will have some on hand night as well.  - valACYclovir (VALTREX) 1000 MG tablet; TAKE ONE TABLET BY MOUTH THREE TIMES A DAY FOR 7 DAYS as needed for outbreaks  Dispense: 42 tablet; Refill: 2  - HM2(CBC w/o Differential); Future    4. Chronic kidney disease (CKD) stage G3a/A1, moderately decreased glomerular filtration rate (GFR) between 45-59 mL/min/1.73 square meter and albuminuria creatinine ratio less than 30 mg/g (H)  -Symptoms are improving.  Most recent labs were much improved.  I am going to have her look at her diet to make sure she is not taking too much protein and we did send some information about a renal diet for her.  If her kidney function drops again we should look at getting her into nephrology as well.  - Comprehensive Metabolic Panel; Future        Phone call duration:  29  minutes    Caitlyn Rees MD

## 2021-06-09 NOTE — TELEPHONE ENCOUNTER
ANTICOAGULATION  MANAGEMENT    Assessment     Today's INR result of 1.5 is Subtherapeutic (goal INR of 2.0-3.0)        Warfarin taken as previously instructed    No new diet changes affecting INR    No new medication/supplements affecting INR    Continues to tolerate warfarin with no reported s/s of thromboembolism     Reports her urine was tested and traces of blood was found, denies any bright red blood in urine that is visible. She is waiting for provider to perform 24 urine test, appears to be some delays related to patient being positive for COVID etc. Encouraged to follow up with primary office     Previous INR was Therapeutic     Plan:     Spoke with Sandy regarding INR result and instructed:     Warfarin Dosing Instructions:  Change warfarin dose to 10 mg daily on Wednesdays; and 7.5 mg daily rest of week  (10 % change)    Instructed patient to follow up no later than: 2 weeks with [employer] home monitoring system     Education provided: importance of therapeutic range, importance of following up for INR monitoring at instructed interval and importance of taking warfarin as instructed    Sandy verbalizes understanding and agrees to warfarin dosing plan.    Instructed to call the [facility] for any changes, questions or concerns. (#[phone])   ?   Angie Rice RN    Subjective/Objective:      Sandy Spencer, a 79 y.o. female is on warfarin.     Sandy reports:     Home warfarin dose: verbally confirmed home dose with Sandy  and updated on anticoagulation calendar     Missed doses: No     Medication changes:  No     S/S of bleeding or thromboembolism:  Yes: reports trace blood in urine      New Injury or illness:  No     Changes in diet or alcohol consumption:  No     Upcoming surgery, procedure or cardioversion:  No    Anticoagulation Episode Summary     Current INR goal:   2.0-3.0   TTR:   35.5 % (1 y)   Next INR check:   7/8/2020   INR from last check:   1.50! (6/24/2020)   Weekly max warfarin dose:       Target end date:      INR check location:      Preferred lab:      Send INR reminders to:   [facility]    Indications    Other chronic pulmonary embolism without acute cor pulmonale (H) [I27.82]           Comments:            Anticoagulation Care Providers     Provider Role Specialty Phone number    Caitlyn Rees MD Referring Family Medicine [phone]

## 2021-06-10 NOTE — PATIENT INSTRUCTIONS - HE
POST PROCEDURE INSTRUCTIONS  PROCEDURE DONE TODAY:BILATERAL KNEE INJECTION    Rest today. Resume activity tomorrow as able.  Patient demonstrates the ability to ambulate independently with a steady gait at the time of discharge.      It is not unusual to have a temporary increase in your pain after a procedure. You can ice the area 24-48 hrs. 15 min at a time.      You received a steroid injection. This medication can take 2-7 days to take effect. Some temporary side effects include:    Heartburn    Flushed-red face    Insomnia-trouble sleeping-jitters    Diabetics may see a rise in blood sugar for a few days    Low back or SI joint (sacroiliac) injection: you may experience numbness in one or both legs. Please walk with help. This is temporary and will wear off in a few hours. Do not drive if you are tingling, numbness or weakness in your hands/arms or legs/feet.    Headache:  If you experience a headache after a lumbar epidural injection, lie down and drink fluids (especially caffeine). The headache will go away gradually. If it persists notify our clinic.    Fever: call if fever 100 or over, redness/warmth/swelling over the injection site.    No public hot tubs/pools for a couple days    Physical therapy: check with pain center provider regarding resuming therapy.    Monitor your response to the injection and report this at your next visit.    If you have been referred for a procedure only, please call your referring provider for a follow up.    IF YOU PLAN TO GET A FLU SHOT YOU MUST WAIT 2 WEEKS AFTER THIS INJECTION.      CALL IF ANY QUESTIONS [phone]

## 2021-06-10 NOTE — PROGRESS NOTES
I have called Sandy and have been unsuccessful in reaching this patient for 2 months now.  This patient has also not returned any of my messages.  I will continue attempting to reach out to this patient in one month.  I will also check this patient's chart for upcoming appointments, ER reports that may contain a new phone number, or any other recent activity.  I have informed the primary care provider by tasking regarding the lack of successful follow up.

## 2021-06-10 NOTE — PROGRESS NOTES
"Sandy Spencer is a 79 y.o. female who is being evaluated via a billable telephone visit.      The patient has been notified of following:     \"This telephone visit will be conducted via a call between you and your physician/provider. We have found that certain health care needs can be provided without the need for a physical exam.  This service lets us provide the care you need with a short phone conversation.  If a prescription is necessary we can send it directly to your pharmacy.  If lab work is needed we can place an order for that and you can then stop by our lab to have the test done at a later time.    Telephone visits are billed at different rates depending on your insurance coverage. During this emergency period, for some insurers they may be billed the same as an in-person visit.  Please reach out to your insurance provider with any questions.    If during the course of the call the physician/provider feels a telephone visit is not appropriate, you will not be charged for this service.\"    Patient has given verbal consent to a Telephone visit? Yes     What phone number would you like to be contacted at? [phone]    Patient would like to receive their AVS by AVS Preference: Mail a copy.    Additional provider notes:        SUBJECTIVE:  Sandy Spencer is a 79 y.o. female  who presents for follow up.     She has started getting the headaches on a daily basis for the last month since she was in last. She has been taking 1000 mg 2-3 times a day. She wakes up daily with them. She does not feel that they are migraines, although does wonder if it isn't, as it is mainly on one side. It is on both sides several times a week. It feels like it is on the top of her skull as well. She is getting facial swelling as well, does wonder if this is more of her allergies. She is waking up with her pulse in the 80s range. Prior to her son dying it was in the 60s-70s. His birthday was on [birth year] as well.     She is having " some issues with her hearing as well.     She does worry that her memory is off. She does think that her memory is worse as well. She does think that this is on and off. Her sister did have dementia for five years. Her girls have been saying that they are concerned about her. It's recall more than anything. She doesn't know where her thyroid medicine went. She doesn't think that there are anything that she can think of that is causing issues.     She did lose her balance a few times. Is packing to move and her girls were here helping her pack and she knows that she would have fallen if she did not have boxes next to her. She cannot check her BP at home, her cuff seems to be not working. She hadn't bent down, she was not twisting and turning. She was just standing up walking. She is gaining weight, is eating ok. Is drinking ok as well. She is not sleeping well. She is getting on average 6 hours at night and is taking four trazodone at night.       Chief Complaint   Patient presents with     Pain     Three week follow up since increasing the Nortriptyline to help with RSD pain. Patient unsure if she notices a difference being on the increased doseage.      Mental Health Problem     Three week follow up to discuss the loss of her son.      Headache     Patient states for the past two weeks she has been waking up with a headache that lasts throughout the day. Denies feeling dizzy, having blurred vision or becoming nauseous from the headaches.      Memory Loss     Patient wonders if she has been having memory troubles. Misplaced her Levothyroxine, unable to locate it. Has been without it for 4 days, did call in to have it refilled.      Balance Concerns.     Patient feels that she does loose her balance at times, has not fallen down yet because of it. But this worries her.          Patient Active Problem List   Diagnosis     Chronic kidney disease (CKD) stage G3a/A1, moderately decreased glomerular filtration rate (GFR)  between 45-59 mL/min/1.73 square meter and albuminuria creatinine ratio less than 30 mg/g (H)     Focal glomerular sclerosis     RSD lower limb, bilateral     ADD (attention deficit disorder)     RSD upper limb, right     Osteopenia     Major depression     Hypertension     Insomnia     Mixed hyperlipidemia     Right rotator cuff tear     Cluster headaches     Lumbar radiculopathy     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     Temporomandibular joint-pain-dysfunction syndrome (TMJ)     Localized swelling of lower leg     Acquired hypothyroidism     Chronic pain syndrome     Snoring     Abdominal pain, generalized     Dermatochalasis of left eyelid     Bilateral carotid artery stenosis     Coronary artery disease involving native coronary artery of native heart without angina pectoris     Sinus bradycardia     Other chronic pulmonary embolism without acute cor pulmonale (H)     Splenic infarction     Opioid type dependence, continuous (H)     Hematuria     Hydronephrosis     Bladder spasms     Anxiety disorder due to medical condition     Family relationship problem     History of posttraumatic stress disorder (PTSD)     Generalized muscle weakness     Myofascial pain     Moderate major depression (H)     Elevated lipase     Abdominal bloating     Acquired absence of other specified parts of digestive tract     Ampullary stenosis     Obstruction of biliary tree     Anemia     Aphthous ulcer of ileum     Bipolar disorder, unspecified (H)     Disorder of phosphorus metabolism     Edema     Gastroesophageal reflux disease without esophagitis     Obstruction of common bile duct     Overflow diarrhea     History of partial colectomy     Complex regional pain syndrome     Solitary left kidney     Flank pain     Hypocitraturia     Low urine output       Current Outpatient Medications   Medication Sig Dispense Refill     calcium carb/vitamin D3/vit K1 (VIACTIV ORAL) Take 1 tablet by mouth daily.       evolocumab 140  mg/mL PnIj Inject 140 mg under the skin every 14 (fourteen) days. 6 mL 3     [START ON 8/31/2020] fentaNYL (DURAGESIC) 75 mcg/hr Place 1 patch on the skin every other day. 15 patch 0     lamoTRIgine (LAMICTAL) 100 MG tablet One and one-half tab morning, one bedtime 75 tablet 2     levothyroxine (LEVO-T) 50 MCG tablet Take 1 tablet (50 mcg total) by mouth Daily at 6:00 am. 90 tablet 3     methocarbamoL (ROBAXIN) 500 MG tablet Take 1 tablet (500 mg total) by mouth 4 (four) times a day. (Patient taking differently: Take 500 mg by mouth 4 (four) times a day. Taking one tablet 3 to 4 times a week.) 60 tablet 1     naloxone (NARCAN) 4 mg/actuation nasal spray 1 spray (4 mg dose) into one nostril for opioid reversal. Call 911. May repeat if no response in 3 minutes. 1 Box 0     nortriptyline (PAMELOR) 10 MG capsule Take 4 capsules (40 mg total) by mouth at bedtime. 120 capsule 1     potassium citrate (UROCIT-K) 10 mEq (1,080 mg) SR tablet Take 2 tablets (20 mEq total) by mouth 2 (two) times a day. 360 tablet 1     rosuvastatin (CRESTOR) 40 MG tablet Take 1 tablet (40 mg total) by mouth at bedtime. 90 tablet 3     sodium phosphates 133 mL (FLEET ENEMA) 19-7 gram/118 mL Enem rectal enema Take as directed by the preparation instructions       SUMAtriptan (IMITREX) 50 MG tablet Take 1 tablet (50 mg total) by mouth every 2 (two) hours as needed for migraine. 9 tablet 1     traZODone (DESYREL) 100 MG tablet TAKE 2 TO 4 TABLETS(200  MG) BY MOUTH AT BEDTIME AS NEEDED FOR SLEEP (Patient taking differently: Take 400 mg by mouth at bedtime. TAKE  4 TABLETS(400 MG) BY MOUTH AT BEDTIME AS NEEDED FOR SLEEP) 360 tablet 1     valACYclovir (VALTREX) 1000 MG tablet TAKE ONE TABLET BY MOUTH THREE TIMES A DAY FOR 7 DAYS as needed for outbreaks 42 tablet 2     warfarin ANTICOAGULANT (COUMADIN/JANTOVEN) 5 MG tablet Take one to two tablets (5 to 10 mg) by mouth daily. Adjust dose based on INR results as directed. 180 tablet 3     Current  Facility-Administered Medications   Medication Dose Route Frequency Provider Last Rate Last Dose     teriparatide injection 20 mcg (FORTEO)  20 mcg Subcutaneous DAILY Caroline Sumner, NOMAN           Social History     Tobacco Use   Smoking Status Former Smoker     Packs/day: 1.00     Years: 20.00     Pack years: 20.00     Last attempt to quit: 2000     Years since quittin.6   Smokeless Tobacco Never Used           OBJECTIVE:        No results found for this or any previous visit (from the past 240 hour(s)).    There were no vitals filed for this visit.  Weight: 138 lb (62.6 kg) (patient reports)        Assessment/Plan:  1. RSD lower limb, bilateral  -Seem to have been doing well with the 50 mg orally daily of nortriptyline although concern of increased memory loss since his dose increase.  Recommended backing down to 40 mg orally daily, and will follow-up again in approximately 1 month for recheck, sooner as necessary.  - nortriptyline (PAMELOR) 10 MG capsule; Take 4 capsules (40 mg total) by mouth at bedtime.  Dispense: 120 capsule; Refill: 1    2. Grieving  -She seems to be tolerating the loss of her son a bit better.  She continues to struggle with her family and interpersonal dynamics with her family but generally this is brought her and her 2 daughters closer together.  She continues to be angry at times and she continues to be tearful at times.  -Continue to monitor for now.    3. Memory loss  -We discussed that the things that she is describing are certainly very normal in the light of the recent traumatic death of her child and the stress that is happening because of that, as well as she is on multiple medications that can contribute to memory loss including the nortriptyline, fentanyl, trazodone being the most likely culprits.  As the nortriptyline is new I recommended she try decreasing the dose to 40 mg orally daily and will follow-up in 3 to 4 weeks.  If she continues to struggle we may want to  consider doing neuropsych testing just for formal documentation with the family history of Alzheimer's disease.        Phone call duration:  44 minutes    Caitlyn Rees MD

## 2021-06-10 NOTE — TELEPHONE ENCOUNTER
"Patient on [facility] lab schedule today 8/12/20 at 10:45am for \"Labs per Allie BROWN\". No orders from Caroline Sumner.  "

## 2021-06-10 NOTE — TELEPHONE ENCOUNTER
Patient dropped off a letter confirming her disability from her prior PCP in 1998. She has been on disability for some time due to her chronic medical problems. Letter composed confirming this.

## 2021-06-10 NOTE — PROGRESS NOTES
"Sandy Spencer is a 76-year-old with a history of chronic pain syndrome with anxiety/depression cluster headaches low back pain with reflex sympathetic dystrophy who presents today for concerns regarding her blood pressure.  She has noticed that when she is in more pain her blood pressure is high.  Sometimes as high as 200/100.  She feels her MS is not helping her as much.  She is due to follow-up with the pain clinic.  She does have a history of chronic kidney disease and many allergies.  She does not do much in the way of exercise.  She is a snorer but tells me she has been checked for sleep apnea and instead she uses a mouth splint.  She is a non-smoker nonalcohol user who does use caffeine twice a day.  She is been treated for high blood pressure in the past but it has caused hypotension for her and yet she has the spikes of high blood pressure presumably associated with increased pain.  We reviewed her past medical history as well as family history    Objective:/74  Pulse 88  Resp 16  Ht 5' 3.5\" (1.613 m)  Wt 163 lb (73.9 kg)  Breastfeeding? No  BMI 28.42 kg/m2  Anxious appearing woman in no obvious distress  Neck is supple without lymphadenopathy thyromegaly or bruits lungs are clear heart with a regular rate and rhythm without murmur rub gallop normal S1-S2 lower extremities are free of edema neurological examination is nonfocal    Labs pending    Assessment: Hypertension with stage III kidney disease history of snoring without sleep apnea?,  Chronic pain syndrome    Plan: We will call her with labs when available, and asked her to work on low-salt diet increase fluids and start an exercise program presumably with a stationary bike as she would have a problem with almost any other mode of exercise.  We discussed the potential use of blood pressure medications on an as-needed basis for hypertension but also looking at her other medications to see if they could be contributing to some spikes.  Have " her follow-up here with in 1 month for reassessment  Total visit time is greater than 25 minutes with majority spent in counseling regarding her hypertension

## 2021-06-10 NOTE — PATIENT INSTRUCTIONS - HE
PLAN:  Increase lamotrigine to 100 mg tablets 1-1/2 in the morning and 1 at bedtime to help with mood and pain.    Continue with the fentanyl 75 mcg every 48 hours.    Continue with physical therapy as you are.    Follow-up with Dr. Lynn in 6 to 8 weeks.

## 2021-06-10 NOTE — PROGRESS NOTES
DATE OF SERVICE: 04/21/2017    Kerry saw Felicia Damon, nurse practitioner last appointment.  They discussed  that she had some morphine more recently with Dr. Mittal and found it helpful  compared to oxycodone and will continue.    She describes being more depressed and isolated.  We reviewed the dynamics with her  family that she had shared with me last time.  These continue to be very complicated  as she learns of other issues.  She has missed appointments with her psychotherapist  be due to car problems but plans to reschedule.    She has been having more pain in her knees, both keep swelling.  She has pain in the  right lateral aspect of her right knee.  There is burning on the back of both legs.   She had hoped some of the burning would be addressed by her back surgery last  August, has not.  She notes her left foot on the outer aspect can be cold at times  and right leg has the typical chronic regional pain syndrome.  She can have poor  sleep with the pain waking her up.    Her blood pressure has been labile, has not seen Caitlyn Rees for a while to  review her blood pressure medications.    She had a recent visit to [place] and saw family and friends.  She is recognizing  that she wishes she would not have moved here.  She came here for her children and  she has not been having the relationship she had hoped.  She notes she has given  them some much money she cannot afford to go back and reestablish.  She also likes  her medical team here.    She has found the morphine seems to work better than the oxycodone, takes it for the  immediate effect release up to 4 times a day which is more than she would like.    The celiac block she had with Dr. Mittal was very helpful.  Some of the symptoms  are returning and she would like to have this repeated and also talk to him about  any options for injections for her right leg pain.    When last seen we started a trial of Trileptal for the chronic regional  pain  syndrome.  She described having significant sweating while she was in [place],  talked to the pharmacist who thought she might have the serotonin syndrome.    She had also found increasing the Celexa from 10 to 20 mg did not do as well with  sweating.  She notes that she is very sensitive to sulfa aspects of medications.    Blood pressure elevated today 154/78, pulse 68, pain score 6.  She is alert with a  clear sensorium well groomed with attention to styling.  Affect is constricted.    On exam, her lower legs are not discolored as there are not changes in the skin.   Both knees do have swelling inferiorly.  Her right knee is tender to palpation  medially and inferiorly.  Drawer exam is intact bilaterally.    IMPRESSION:  Chronic pain includes chronic regional pain syndrome right lower  extremity, history of left lower extremity involving with history of back surgery  with right radiculopathy, some symptoms residual.    Has had abdominal pain with pancreatitis with good benefit with celiac block and  would like that repeated.  She has had problems with headaches.    There are significant psychosocial stressors noted for her family.    PLAN:  Will continue with the morphine 15 mg immediate release every 6 hours as  needed.    She is to schedule with Dr. Zafar for addressing her blood pressure.    She will reschedule with her psychotherapist.    Recommended a consultation with [facility] around her knees.    Did discuss a trial of acamprosate off label for neuropathic pain.  Usual side  effects discussed.    She would like to see the acupuncturist we have starting in the office though  concerned her insurance does not cover.    Time spent 25 minutes face to face, more than 50% counseling about above condition  and coordination of treatment plan.      ARELIS FITZPATRICK MD  pa  D 04/21/2017 08:52:30  T 04/21/2017 09:21:00  R 04/21/2017 09:21:00  [id number]        cc:SANJUANITA ZAFAR MD

## 2021-06-10 NOTE — TELEPHONE ENCOUNTER
New Appointment Needed  What is the reason for the visit:  3 wk f.u / labs  Provider Preference: PCP only  How soon do you need to be seen?: 3 wks from 08/24.   Waitlist offered?: No  Okay to leave a detailed message:  Yes    Please call pt to go over options as soon as able. She would like a F2F appt.

## 2021-06-10 NOTE — TELEPHONE ENCOUNTER
ANTICOAGULATION  MANAGEMENT    Assessment     Today's INR result of 1.8 is Subtherapeutic (goal INR of 2.0-3.0)        Missed dose(s) may be affecting INR - reports missed several doses because of moving and couldn't find the medication, patient was confused on why I was calling and was mixing levothyroxine with warfarin. Writer sent new prescription of warfarin to the pharmacy, patient was able to call me back, verbalize pill color, strength amount      No new diet changes affecting INR    No new medication/supplements affecting INR    Continues to tolerate warfarin with no reported s/s of bleeding or thromboembolism     Previous INR was Subtherapeutic    Plan:     Spoke on phone with Sandy regarding INR result and instructed:     Warfarin Dosing Instructions:  Take one time booster dose warfarin 10 mg  then continue current warfarin dose 10 mg daily on Fridays; and 7.5 mg daily rest of week  (0 % change)    Patient verbalized understanding of above instructions     Instructed patient to follow up no later than: 2 weeks     Education provided: importance of therapeutic range, importance of following up for INR monitoring at instructed interval, importance of taking warfarin as instructed and when to seek medical attention/emergency care    Sandy verbalizes understanding and agrees to warfarin dosing plan.    Instructed to call the [facility] for any changes, questions or concerns. (#[phone])   ?   Angie Rice RN    Subjective/Objective:      Sandy Spencer, a 79 y.o. female is on warfarin.     Sandy reports:     Home warfarin dose: verbally confirmed home dose with Sandy and updated on anticoagulation calendar     Missed doses: Yes: 4 doses      Medication changes:  No     S/S of bleeding or thromboembolism:  No     New Injury or illness:  No     Changes in diet or alcohol consumption:  No     Upcoming surgery, procedure or cardioversion:  No    Anticoagulation Episode Summary     Current INR goal:    2.0-3.0   TTR:   27.0 % (1 y)   Next INR check:   9/2/2020   INR from last check:   1.80! (8/26/2020)   Weekly max warfarin dose:      Target end date:      INR check location:      Preferred lab:      Send INR reminders to:   [facility]    Indications    Other chronic pulmonary embolism without acute cor pulmonale (H) [I27.82]           Comments:            Anticoagulation Care Providers     Provider Role Specialty Phone number    Caitlyn Rees MD Referring Family Medicine [phone]

## 2021-06-10 NOTE — PROGRESS NOTES
The patient called and was looking for a F/U appointment with Dr. Rees. The patient is going to the Pain Clinic and her BP is out of control. The Care Guide called the patient back twice but was unable to reach anyone. The Care Guide left a detailed message.

## 2021-06-10 NOTE — PROGRESS NOTES
"Assessment/Plan:     Problem List Items Addressed This Visit     Lumbar radiculopathy    Chronic pain syndrome              No follow-ups on file.    There are no Patient Instructions on file for this visit.      Subjective:       79 y.o. female The patient has been notified of the following:      \"We have found that certain health care needs can be provided without the need for a face to face visit.  This service lets us provide the care you need with a phone conversation.       I will have full access to your [facility] medical record during this entire phone call.   I will be taking notes for your medical record.      Since this is like an office visit, we will bill your insurance company for this service.       There are potential benefits and risks of telephone visits (e.g. limits to patient confidentiality) that differ from in-person visits.?  Confidentiality still applies for telephone services, and nobody will record the visit.  It is important to be in a quiet, private space that is free of distractions (including cell phone or other devices) during the visit.??      If during the course of the call I believe a telephone visit is not appropriate, you will not be charged for this service\"     Consent has been obtained for this service by care team member: Yes        He reviews her only son, recently killed in [place], apparently by winter.  She is quite distressed.  She reviews her blood pressure and heart rate is been elevated.  She is working with her primary care provider.    She did not recently had injection for her knees.    She has been taking Lamictal 100 mg twice a day wonders if he can increase for the anxiety depression.  Her primary care provider did increase her nortriptyline to 50 mg over the last 2 weeks.  Has had no anticholinergic side effects.    She is off the Topamax.    She continues working with her physical therapist.    She is concerned she could develop the shingles again with stress, " does have the valacyclovir.    She continues with pain in her back.  Wonders if it was due to spurs on her back affecting the nerves or kidney pathology.    She has been on the fentanyl patch 75 mcg every other day.   is reviewed, as expected.  Last urine drug test was in February.      Current Outpatient Medications:      calcium carb/vitamin D3/vit K1 (VIACTIV ORAL), Take 1 tablet by mouth daily., Disp: , Rfl:      evolocumab 140 mg/mL PnIj, Inject 140 mg under the skin every 14 (fourteen) days., Disp: 6 mL, Rfl: 3     fentaNYL (DURAGESIC) 75 mcg/hr, Place 1 patch on the skin every other day., Disp: 15 patch, Rfl: 0     lamoTRIgine (LAMICTAL) 100 MG tablet, One tablet morning, one-half bedtime (Patient taking differently: Take 100 mg by mouth 2 (two) times a day. One tablet morning, one tablet bedtime), Disp: 135 tablet, Rfl: 3     levothyroxine (LEVO-T) 50 MCG tablet, Take 1 tablet (50 mcg total) by mouth Daily at 6:00 am., Disp: 90 tablet, Rfl: 3     methocarbamoL (ROBAXIN) 500 MG tablet, Take 1 tablet (500 mg total) by mouth 4 (four) times a day. (Patient taking differently: Take 500 mg by mouth 4 (four) times a day. Taking one tablet 3 to 4 times a week.), Disp: 60 tablet, Rfl: 1     naloxone (NARCAN) 4 mg/actuation nasal spray, 1 spray (4 mg dose) into one nostril for opioid reversal. Call 911. May repeat if no response in 3 minutes., Disp: 1 Box, Rfl: 0     nortriptyline (PAMELOR) 50 MG capsule, Take 1 capsule (50 mg total) by mouth at bedtime., Disp: 30 capsule, Rfl: 2     potassium citrate (UROCIT-K) 10 mEq (1,080 mg) SR tablet, Take 2 tablets (20 mEq total) by mouth 2 (two) times a day., Disp: 360 tablet, Rfl: 1     rosuvastatin (CRESTOR) 40 MG tablet, Take 1 tablet (40 mg total) by mouth at bedtime., Disp: 90 tablet, Rfl: 3     sodium phosphates 133 mL (FLEET ENEMA) 19-7 gram/118 mL Enem rectal enema, Take as directed by the preparation instructions, Disp: , Rfl:      SUMAtriptan (IMITREX) 50 MG  "tablet, Take 1 tablet (50 mg total) by mouth every 2 (two) hours as needed for migraine., Disp: 9 tablet, Rfl: 1     traZODone (DESYREL) 100 MG tablet, TAKE 2 TO 4 TABLETS(200  MG) BY MOUTH AT BEDTIME AS NEEDED FOR SLEEP (Patient taking differently: Take 400 mg by mouth at bedtime. TAKE  4 TABLETS(400 MG) BY MOUTH AT BEDTIME AS NEEDED FOR SLEEP), Disp: 360 tablet, Rfl: 1     valACYclovir (VALTREX) 1000 MG tablet, TAKE ONE TABLET BY MOUTH THREE TIMES A DAY FOR 7 DAYS as needed for outbreaks, Disp: 42 tablet, Rfl: 2     warfarin ANTICOAGULANT (COUMADIN/JANTOVEN) 5 MG tablet, Take one to two tablets (5 to 10 mg) by mouth daily. Adjust dose based on INR results as directed., Disp: 180 tablet, Rfl: 3    Current Facility-Administered Medications:      teriparatide injection 20 mcg (FORTEO), 20 mcg, Subcutaneous, DAILY, Caroline Sumner NP    Telephone alert, clear sensorium.  Thought process tight logical.  Affect is constricted and congruent.                 Objective:     Vitals:    08/10/20 1445   Weight: 138 lb (62.6 kg)   Height: 5' 2\" (1.575 m)   PainSc:   2   PainLoc: Back         Pression: Chronic pain includes multiple generators with lower extremity chronic regional pain syndrome, lumbar radicular changes, migraine headaches, chronic abdominal pain.    Plan: We will increase the lamotrigine to 150 mg morning and 100 at bedtime to see if this may help with depression and anxiety symptoms related to her stressors.    She will continue with physical therapy.    Total time more than 15 minutes.          This note has been dictated using voice recognition software. Any grammatical or context distortions are unintentional and inherent to the software  "

## 2021-06-10 NOTE — SEDATION DOCUMENTATION
Pt states she is fine with steroids, (cortisone is listed as an allergy), as she has had a lot in the past.

## 2021-06-10 NOTE — TELEPHONE ENCOUNTER
Received a faxed INR result for Sandy Spencer  From [facility]  INR result dated 7/28/2020 is 2.9

## 2021-06-10 NOTE — TELEPHONE ENCOUNTER
Letter at  for p/u. Copy of documentation patient provided was copied and sent to scanning.     Pt notified by voicemail that letter is ready for p/u.

## 2021-06-10 NOTE — PROGRESS NOTES
"Sandy Spencer is a 79 y.o. female who is being evaluated via a billable telephone visit.      The patient has been notified of following:     \"This telephone visit will be conducted via a call between you and your physician/provider. We have found that certain health care needs can be provided without the need for a physical exam.  This service lets us provide the care you need with a short phone conversation.  If a prescription is necessary we can send it directly to your pharmacy.  If lab work is needed we can place an order for that and you can then stop by our lab to have the test done at a later time.    Telephone visits are billed at different rates depending on your insurance coverage. During this emergency period, for some insurers they may be billed the same as an in-person visit.  Please reach out to your insurance provider with any questions.    If during the course of the call the physician/provider feels a telephone visit is not appropriate, you will not be charged for this service.\"    Patient has given verbal consent to a Telephone visit? Yes    What phone number would you like to be contacted at? [phone]    Patient would like to receive their AVS by AVS Preference: Mail a copy.    Patient is here for a follow up appointment with Dr. Lynn. Patient has headache, low back and bilateral legs pain, describes the pain as intermittent and sharp,rates the pain as 8/10. Patient needs refills for Fentanyl and lamotrigine. CSA, TIMA, NDI and UDT are deferred due to COVID 19. Functionality is 5. Patient states their pain interferes with sleep and work.    Perla Maya, NII    "

## 2021-06-10 NOTE — PROGRESS NOTES
Subjective:   Sandy Spencer is a 76 y.o. female who presents for evaluation of pain. Patient was last seen 04/21/2017.      Major issues:  1. Reflex sympathetic dystrophy        CC: Pain  See rooming evaluation    HPI:   Impact of pain treatments:   Analgesia: poor   ADL's: Work: she is retired. Household: she lives alone.. Social: she is able to socialize in a fashionable manner.   AE's: denies   Aberrant behavior: denies    Headache:  Frequency of HA: daily  Duration of HA: days  Quality of HA: severe   Location of HA: occipital area  Severity of HA: moderate to severe   Stimulus for a HA: pain  Aura description: heavyness  #of HA days per mo: (15/mo to consider Botox)  Rehabilitation: none  Interventional: none  Prophylactic treatment: Imitrex  Abortive Medication for HA: Sumatriptan  Management: strategies pain medications      Aggravating factors: Reaching over her head, lifting  Alleviating factors: medications  Associated symptoms: increased pain  DME: none  Location/Laterality of the pain: headache, back pain  Timing: constant  Quality: throbbing, ache, shooting  Severity:7/10    Activities Impaired by Increasing Pain Severity: F= 8  3-Enjoy  4-Work, Enjoy  5-Active, Mood Work Enjoy  6-Sleep, Active, Mood Work Enjoy  7-Walk, Sleep, Active, Mood Work Enjoy  8-Relate, Walk, Sleep, Active, Mood Work Enjoy      Medication: Patient is taking morphine two times a day.     Last opioid dose was Morphine last dose- 4/27/17 @ 700pm.       Interventional: Has had injections previously and she feels like it assist with pain. She is interested in getting a block injection.    Integrative Approaches: Stay active    Mental Health: Has a psychiatrists that she sees, Tha    Records: Reviewed to prepare for today's visits and reflected throughout the note.    Additional Problems: Increased pain.    Diagnostics:   Lab:  Last UDS/SWAB on 10/6/2016 was reviewed and was appropriate.      Review of Systems pblm  "pert  Constitutional- + sleep disturbances, + activity intolerance  Musculoskeletal- + pain  Neuro- - cognitive changes, - radicular, - neuropathic symptoms  GI/-  - constipation, - urinary difficulty  Psych-  + mood disorders,  - taking medication in a fashion other than prescribed    Objective:     Vitals:    04/28/17 1132   BP: 156/90   Pulse: 76   Resp: 14   Weight: 160 lb (72.6 kg)   Height: 5' 3.5\" (1.613 m)   PainSc:   7     Constitutional:  Pleasant and cooperative female who presents alone today.   Psychiatric: Mood and affect are appropriate for the situation, setting and topic of discussion.  Patient does not appear sedated.  Integumentary:  Observed skin WNL  HEENT: EOM's grossly intact.    Chest: Breathing is non-labored.   Neurological:  Alert and oriented in all spheres including: time, place, person and situation.  Durable Medical Equipment: none    Assessment:   Sandy Spencer is a 76 y.o. female seen in clinic today for  Chronic pain and opioid dependence.  Today she is disappointed that her Orthopedic doctor has discouraged her about having knee suguries done.  She was told that she is a high risck and with her stage III renal failure she might not be able to get through anesthesia, even though arthritis in her knees are bone to bone grinding.        She reports having a hard time sleeping lately.  She has increased her Trazodone up to 400 mg and she was able to get only 2-3 hours of sleep maximum.  She has been on this medication for 18 years and she feels like she is tolerant to it.  We discussed about safety issues and self escalating medications without informing the physician.  I have referred her to Dr. Rees for further management of her sleep.      During last office, she was started on Acamprosate.  She reports side effects with that.  She reports, nausea, vomiting, diarrhea.   She saw a doctor and some labs were ordered where she has a scheduled appointment with Dr. Rees for a " follow up.      She has to sell her condo because her building does not have an elevator and it makes it difficult for her to climb 16 stairs.  She is OK with the decision.  She will like to move back to [place] but she like her medical providers being that they seem to have a good handle on her ain and issues.   Saw md ordered blood test urine collection, diarrhea schedulet to see her Wednesday morning.        Plan:   Plan/NextSteps:     Medication:   Medication prescribed today Morphine 15 mg up to 5 tablets a day.      REFILL INSTRUCTIONS:  Please contact the clinic refill line 7 days before your refill is due. Speak clearly; note cell phones cut in-and-out and poor quality speech and reception issues will influence our ability to hear you and be efficient with your prescription.     Call [phone] leave:   Your name (first and last w/ spelling)   Date of birth  Name of all the medication(s) being requested  Dose of the medication(s)   How you are taking the medication (eg. twice per day etc).     Contact your pharmacy 3 (three) days after leaving your message to see if your prescription has been received. Please request the pharmacy check your profile to be certain about any concerns with a script failing to be received. Note: Ashley updates have been inconsistent.  If the script has not been received there may have been a problem with the communication please reach back out to the clinic.         Interventional: She is interested in getting another injection with Dr. Mittal.      Integrative Approaches: Stay active     Health Maintenance: per primary care     Records: Reviewed to assist with preparation for the office visit and are reflected throughout the note.    Follow up: 2 weeks    Education: Please call Monday-Friday for problems or questions and one of the clinical support staff (CSS) will help to get things figured out. The number is [phone]. Some folks are using [employer] to send and e-mail.  Please remember some issues require an office visit.     Reviewed the plan of care, provided justification and answered questions with the patient.     SAFETY REMINDERS  No alcohol while taking controlled substances. Alcohol is not an illegal substance, it is unsafe to use in combination. It is a build up of substances in the body that can be extremely hazardous and may cause respirations to slow to a dangerous rate resulting in hospitalization, brain damage, or death.    Opioid medications have been associated with sharp rise in unintentional overdose and death.  Overdose is a condition characterized by the consumption in excess of a particular drug causing adverse effects. Symptoms of overdose include: breathing slow and shallow, erratic or not at all, pinpoint pupils, hallucinations, confusion, muscle jerks, slack muscles, extreme sleepiness or loss of alertness, awake but not able to talk, face pale or clammy, vomiting, for lighter skinned people, the skin tone turns bluish purple, for darker skinned people, it turns grayish or ashen. If in a situation where overdose is a concern engage the emergency response system (dial 911).    Do not sell, loan, borrow or share your opioid medication with anyone. Deaths have occurred as a result of this practice. It is illegal and patients are being prosecuted.       *Universal Precautions:   UDS/Swab- 10/06/2016   Consent-   Agreement- 4/22/2016  Pharmacy- as documented   - [id number]  Count- n/a  Psychological evaluation n/a  Pharmacogenetic testing- n/a  MME- 90       Management of opioid medication is inherently a moderate to high complex medial interaction based on the risk management required at each contact r/t risks and side effects.    Patient Arrived @ 1101 for a 1120 appointment.     TT: 25 - min  CT: over half spent in education and counseling as outlined in the plan.      Stacie MAIER FNP-C  [address] [place]  [address]  [facility]  [phone]  [phone]
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
## 2021-06-10 NOTE — TELEPHONE ENCOUNTER
ANTICOAGULATION  MANAGEMENT    Assessment     Today's INR result of 1.6 is Subtherapeutic (goal INR of 2.0-3.0)        Warfarin taken as previously instructed    No new diet changes affecting INR    No new medication/supplements affecting INR    Continues to tolerate warfarin with no reported s/s of bleeding or thromboembolism     Previous INR was Supratherapeutic    Plan:     Spoke on phone with Sandy regarding INR result and instructed:     Warfarin Dosing Instructions:  Take one time booster dose warfarin 10 mg  then change warfarin dose to 10 mg daily on Fridays ; and 7.5 mg daily rest of week  (10 % change)    Instructed patient to follow up no later than: 2 weeks     Education provided: importance of therapeutic range, importance of following up for INR monitoring at instructed interval, importance of taking warfarin as instructed, monitoring for clotting signs and symptoms and when to seek medical attention/emergency care    Sandy verbalizes understanding and agrees to warfarin dosing plan.    Instructed to call the [facility] for any changes, questions or concerns. (#[phone])   ?   Angie Rice RN    Subjective/Objective:      Sandy Spencer, a 79 y.o. female is on warfarin.     Sandy reports:     Home warfarin dose: verbally confirmed home dose with Sandy  and updated on anticoagulation calendar     Missed doses: No     Medication changes:  No     S/S of bleeding or thromboembolism:  No     New Injury or illness:  No     Changes in diet or alcohol consumption:  No     Upcoming surgery, procedure or cardioversion:  No    Anticoagulation Episode Summary     Current INR goal:   2.0-3.0   TTR:   28.9 % (1 y)   Next INR check:   8/26/2020   INR from last check:   1.60! (8/12/2020)   Weekly max warfarin dose:      Target end date:      INR check location:      Preferred lab:      Send INR reminders to:   [facility]    Indications    Other chronic pulmonary embolism without acute cor pulmonale  (H) [I27.82]           Comments:            Anticoagulation Care Providers     Provider Role Specialty Phone number    Caitlyn Rees MD Referring Family Medicine [phone]

## 2021-06-10 NOTE — PROGRESS NOTES
ASSESSMENT/PLAN:       1. Atypical chest pain  -The patient was then prior to her ERCP she describes some atypical chest pain that seem to be most likely related to her chronic pancreatitis.  This has gotten somewhat better but given her history of hyperlipidemia, poor exercise tolerance and questionable history of some coronary disease in the past I have ordered a stress test for further evaluation.  She requested an exercise stress test as she has felt unwell with the medication in the past for a pharmacological 1 but her ability to exercise is quite limited and we discussed that this would not give us very good information.  In her opinion it is acceptable to proceed with a pharmacologic stress test.  - NM Pharmacologic Stress Test; Future    2. Fatigue  -The patient brings in her Fitbit today for evaluation.  She does have fairly erratic sleep patterns.  She does not feel rested in the mornings when she wakes up either.  Given this as well as the fact that when she was in the hospital last it was noted that she had oxygen saturations into the 80s while sleeping I have referred her for another sleep medicine evaluation.  We discussed good sleep hygiene as well.  - Ambulatory referral to Sleep Medicine    3. Poor sleep pattern  - Ambulatory referral to Sleep Medicine    4. Hypertension  -Patient's blood pressure today is very good.  She is tolerating the losartan without difficulties.  Updating lab work to make sure there is no signs of kidney damage given her possible history of intolerance in the past.  Additionally we discussed limiting morphine as able however I suspect that her elevated blood pressures when she is not taking the morphine or secondary to pain.  Follow-up in 6 weeks.  - losartan (COZAAR) 25 MG tablet; Take 1 tablet (25 mg total) by mouth daily.  Dispense: 90 tablet; Refill: 1  - Basic Metabolic Panel    5. Anxiety and depression  -Patient's mood is brighter today than it has been.  She is  experiencing night sweats which she equates to be related to the citalopram as when she was on a higher dose of citalopram the night sweats or worse.  Given that she is asked to decrease down to 5 mg orally daily which I think is appropriate.  She will follow-up in 6 weeks for reevaluation.  - citalopram (CELEXA) 10 MG tablet; Take 0.5 tablets (5 mg total) by mouth daily.  Dispense: 45 tablet; Refill: 1    6. Chronic pain syndrome  -She takes as needed diazepam prior to procedures that she gets with the pain clinic.  Updating prescription today.  - diazePAM (VALIUM) 5 MG tablet; Take 1 tab 30 minutes before procedure appointment. Take 2nd tab after signing consent form as needed  Dispense: 6 tablet; Refill: 1    40 minutes was spent face-to-face with the patient during this encounter and >50% of this was spent on counseling and coordination of care. We discussed in depth the topics listed above.           Caitlyn Rees MD      PROGRESS NOTE   5/23/2017    SUBJECTIVE:  Sandy Spencer is a 76 y.o. female  who presents for procedure follow up.     She did have an ERCP done on 5/18. She did have a stenosis of something. She did have stenting done and then she is going to have to have an xray done this week to see if this does come out on its own. She did stay at the hospital the night after her procedure. She did have some hallucinations following the procedure and was getting asked quite a bit of she was taking things from home, she feels that this was related to the anesthesia. She has done ok with Versed and Fentanyl in the past and is wondering if she got something else.      She did take a morphine this morning which she think is why her BP is lower today. She would be willing to get off of her morphine if it meant that she could have her knee surgery. She is wondering if I can talk to orthopedics about the contraindications to surgery to see if there is anything else she can do to help expedite  surgery. She has had no issues with the losartan as far as she can tell.     She does have a question about her sleep. She does have a new fitbit. It does keep track of her pulse. She does find that she doesn't feel rested, and she does have limited REM time on her fit bit. She does feel restless most nights. She does wake up quite wet as well. She does think that this is related to the citalopram as when she was on 20 mg it was worse. She would like to go to 5 mg and see how she does. She does think that she was on zoloft in the past. Doesn't recall the side effect. She was on Serzone in the past and had rebound headaches.     Additionally, prior to her ERCP she was talking about some atypical chest pain. It was not exertional and did seem to be more likely related to her chronic pancreatitis. Given this and her questionable history of issues with her heart though we discussed ordering a stress test following her procedure. Things have improved since then but are not gone.     Chief Complaint   Patient presents with     Medication Management     Patient is here today to follow up from being on the Losartan.      Poor Sleep Quality     Patient would like to discuss her sleep pattern. She has brought in her fitbit sleeping patterns to review.          Patient Active Problem List   Diagnosis     Nausea & vomiting     Chronic kidney disease (CKD) stage G3a/A1, moderately decreased glomerular filtration rate (GFR) between 45-59 mL/min/1.73 square meter and albuminuria creatinine ratio less than 30 mg/g     Focal glomerular sclerosis     Anxiety and depression     RSD lower limb, bilateral     ADD (attention deficit disorder)     RSD upper limb, right     Other specified hypothyroidism     Anxiety     Osteopenia     Major depression     Hypertension     Insomnia     Mixed hyperlipidemia     Right rotator cuff tear     Cluster headaches     Tachycardia     Hypoxemia     Lumbar radiculopathy     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     Stenosis of lateral recess of lumbar spine     Reflex sympathetic dystrophy     Acute encephalopathy     Temporomandibular joint-pain-dysfunction syndrome (TMJ)     Pancreatitis     Localized swelling of lower leg     Medical cannabis use     Acute right-sided low back pain without sciatica     Acute exacerbation of chronic low back pain     Acute pancreatitis     Accelerated hypertension     Hypothyroidism, unspecified type     Chronic pain syndrome     Non morbid obesity due to excess calories     Snoring       Current Outpatient Prescriptions   Medication Sig Dispense Refill     aspirin 81 MG EC tablet Take 81 mg by mouth daily.       atorvastatin (LIPITOR) 40 MG tablet Take 1 tablet (40 mg total) by mouth at bedtime. 90 tablet 3     azelastine (ASTEPRO) 0.15 % (205.5 mcg) Spry nasal spray 1 spray by Left Nare route 2 (two) times a day as needed.       citalopram (CELEXA) 10 MG tablet Take 0.5 tablets (5 mg total) by mouth daily. 45 tablet 1     diazePAM (VALIUM) 5 MG tablet Take 1 tab 30 minutes before procedure appointment. Take 2nd tab after signing consent form as needed 6 tablet 1     furosemide (LASIX) 40 MG tablet Take 0.5-1 tablets (20-40 mg total) by mouth daily as needed. 90 tablet 1     levothyroxine (SYNTHROID, LEVOTHROID) 50 MCG tablet Take 1 tablet (50 mcg total) by mouth daily. 90 tablet 1     losartan (COZAAR) 25 MG tablet Take 1 tablet (25 mg total) by mouth daily. 90 tablet 1     [START ON 5/25/2017] morphine (MSIR) 15 MG tablet Take 1 tablet (15 mg total) by mouth 5 (five) times a day for 14 days. Maximum 5 tablets/day 70 tablet 0     SUMAtriptan (IMITREX) 50 MG tablet Take 1 tablet (50 mg total) by mouth every 2 (two) hours as needed for migraine. 27 tablet 0     traZODone (DESYREL) 100 MG tablet Take 2-4 tablets (200-400 mg total) by mouth at bedtime. 360 tablet 1     verapamil (CALAN-SR) 240 MG CR tablet Take 1 tablet (240 mg total) by mouth bedtime. 90 tablet 1      colestipol (COLESTID) 1 gram tablet Take 2 g by mouth 2 (two) times a day.        diphenhydrAMINE (BENADRYL) 25 mg capsule Take 25 mg by mouth every 6 (six) hours as needed.        fluocinolone acetonide oil (DERMOTIC) 0.01 % Drop Instill one drop 1-2 times a week as needed for itching 20 mL 0     lidocaine (LIDODERM) 5 % Place 1 patch on the skin daily as needed. Remove & Discard patch within 12 hours or as directed by MD 10 patch 11     omeprazole (PRILOSEC) 40 MG capsule Take 1 capsule (40 mg total) by mouth daily. 90 capsule 3     OXcarbazepine (TRILEPTAL) 150 MG tablet One daily for four days, one twice a day five days, one three times daily 120 tablet 1     No current facility-administered medications for this visit.        History   Smoking Status     Former Smoker     Packs/day: 1.00     Years: 20.00     Quit date: 1/1/2000   Smokeless Tobacco     Never Used           OBJECTIVE:        Recent Results (from the past 240 hour(s))   Comprehensive Metabolic Panel   Result Value Ref Range    Sodium 139 136 - 145 mmol/L    Potassium 4.0 3.5 - 5.0 mmol/L    Chloride 102 98 - 107 mmol/L    CO2 24 22 - 31 mmol/L    Anion Gap, Calculation 13 5 - 18 mmol/L    Glucose 94 70 - 125 mg/dL    BUN 13 8 - 28 mg/dL    Creatinine 0.87 0.60 - 1.10 mg/dL    GFR MDRD Af Amer >60 >60 mL/min/1.73m2    GFR MDRD Non Af Amer >60 >60 mL/min/1.73m2    Bilirubin, Total 1.0 0.0 - 1.0 mg/dL    Calcium 9.1 8.5 - 10.5 mg/dL    Protein, Total 6.3 6.0 - 8.0 g/dL    Albumin 3.7 3.5 - 5.0 g/dL    Alkaline Phosphatase 62 45 - 120 U/L    AST 20 0 - 40 U/L    ALT 13 0 - 45 U/L   HM2(CBC w/o Differential)   Result Value Ref Range    WBC 4.9 4.0 - 11.0 thou/uL    RBC 4.19 3.80 - 5.40 mill/uL    Hemoglobin 13.2 12.0 - 16.0 g/dL    Hematocrit 39.7 35.0 - 47.0 %    MCV 95 80 - 100 fL    MCH 31.5 27.0 - 34.0 pg    MCHC 33.2 32.0 - 36.0 g/dL    RDW 12.3 11.0 - 14.5 %    Platelets 191 140 - 440 thou/uL    MPV 7.3 7.0 - 10.0 fL   Electrocardiogram Perform -  Clinic   Result Value Ref Range    SYSTOLIC BLOOD PRESSURE  mmHg    DIASTOLIC BLOOD PRESSURE  mmHg    VENTRICULAR RATE 58 BPM    ATRIAL RATE 58 BPM    P-R INTERVAL 138 ms    QRS DURATION 82 ms    Q-T INTERVAL 464 ms    QTC CALCULATION (BEZET) 455 ms    P Axis 45 degrees    R AXIS 21 degrees    T AXIS 68 degrees    MUSE DIAGNOSIS       Sinus bradycardia  Low voltage QRS  Borderline ECG  When compared with ECG of 22-FEB-2017 12:32,  No significant change was found  Confirmed by BETINA PRAKASH MD LOC: ([id number]) on 5/17/2017 3:24:20 PM         Vitals:    05/23/17 1301   BP: 112/62   Patient Site: Left Arm   Patient Position: Sitting   Cuff Size: Adult Regular   Pulse: 66     Weight: 167 lb 9.6 oz (76 kg)        Physical Exam:  GENERAL APPEARANCE: A&A, NAD, well hydrated, well nourished  SKIN:  Normal skin turgor, no lesions/rashes   HEENT: moist mucous membranes, no rhinorrhea, pharynx wnl  NECK: Normal  CV: RRR  LUNGS: CTAB  ABDOMEN: soft, minimally tender in the epigastrium, no guarding or rebound    EXTREMITY: no edema   NEURO: no gross deficits   PSYCH: Brighter affect, well dressed and groomed, normal thought process and speech pattern.

## 2021-06-10 NOTE — PROGRESS NOTES
Subjective:   Sandy Spencer is a 76 y.o. female who presents for evaluation of pain. Patient was last seen 03/03/2017 Dr. Lynn.      Major issues:  1. Reflex sympathetic dystrophy        CC: Pain  See rooming evaluation    HPI:   Impact of pain treatments:   Analgesia: poor   ADL's: She is retired, volunteers once a week.  She is able to participate in chores.  She is able to socialize in a fashionable manner.     AE's: denies   Aberrant behavior: denies    Headache:  Frequency of HA: daily  Duration of HA: days  Quality of HA: pounding   Location of HA: left side, occipital area  Severity of HA: Severe  Stimulus for a HA: pain  Aura description: heavyness  #of HA days per mo: (15/mo to consider Botox)  Rehabilitation: none  Interventional: none  Prophylactic treatment: Imitrex  Abortive Medication for HA: Sumatriptan  Management: strategies pain medications    Aggravating factors: Reaching over the head, lifting  Alleviating factorspainmedications, acupuncture  Associated symptoms: increased pain  DME: none  Location/Laterality of the pain: headache, back pain.  Timing: intermittent, aching  Quality: aching  Severity:6/10 last office visit 8/10    Activities Impaired by Increasing Pain Severity: F= 8  3-Enjoy  4-Work, Enjoy  5-Active, Mood Work Enjoy  6-Sleep, Active, Mood Work Enjoy  7-Walk, Sleep, Active, Mood Work Enjoy  8-Relate, Walk, Sleep, Active, Mood Work Enjoy      Medication: Patient is taking Oxycodone 10 mg three times a day.     Last opioid dose was not reported.       Interventional: Has had injections previously and she feels like it assist with pain    Integrative Approaches: Stay active    Integrative Approaches: Stay active, home exercise [facility]: Has a psychiatry that she sees, Tha     Records: Reviewed to prepare for today's visit and reflected throughout the note.     Additional Problems: Increased pain     Diagnostics:   Lab:  Last UDS/SWAB on 10/6/2016 was reviewed  "and was appropriate.      Review of Systems pblm pert  Constitutional- + sleep disturbances, + activity intolerance  Musculoskeletal- + pain  Neuro- - cognitive changes, - radicular, + neuropathic symptoms  GI/-  - constipation, - urinary difficulty  Psych-  + mood disorders,  - taking medication in a fashion other than prescribed    Objective:     Vitals:    04/14/17 1131   BP: 152/87   Pulse: 72   Weight: 168 lb (76.2 kg)   Height: 5' 3.5\" (1.613 m)   PainSc:   6       Constitutional:  Pleasant and cooperative female who presents alone today.   Psychiatric: Mood and affect are appropriate for the situation, setting and topic of discussion.  Patient does not appear sedated.  Integumentary:  Observed skin WNL  HEENT: EOM's grossly intact.    Chest: Breathing is non-labored.   Neurological:  Alert and oriented in all spheres including: time, place, person and situation.  Durable Medical Equipment: none    Assessment:   Sandy Spencer is a 76 y.o. female seen in clinic today for Chronic pain relates to migraine headaches, complex regional pain.  Today she repors that her pin control is poor.  Her pain is increased and she continues to use Oxycodone 10 mg three times a day.      She returned from [place] 3 days ago and she had been out of her medications.  She reports that when she was out of her medications, Dr. Mittal gave her morphine 15 mg three times a day ans she feels like that worked a little better than the Oxycodone 10 mg three times a day.  Today she will like to change Morphine 15 mg.  I think this is a valid request and i will switch her medications see if it will be effective for her pain.      She is no longer going to the dispensary for medical cannabis due to finances.  She reports that she is not sure if that was effective for her pain.  She reports that Cobalt was effective at a higher dose therefore more costly.      Plan:   Plan/NextSteps:     Medication:   Medication prescribed today Morphine 15 " mg every 4 hours as needed for pain.     REFILL INSTRUCTIONS:  Please contact the clinic refill line 7 days before your refill is due. Speak clearly; note cell phones cut in-and-out and poor quality speech and reception issues will influence our ability to hear you and be efficient with your prescription.     Call [phone] leave:   Your name (first and last w/ spelling)   Date of birth  Name of all the medication(s) being requested  Dose of the medication(s)   How you are taking the medication (eg. twice per day etc).     Contact your pharmacy 3 (three) days after leaving your message to see if your prescription has been received. Please request the pharmacy check your profile to be certain about any concerns with a script failing to be received. Note: Ashley updates have been inconsistent.  If the script has not been received there may have been a problem with the communication please reach back out to the clinic.       Interventional: She is interested in getting another injection with Dr. Mittal.     Integrative Approaches: Stay active     Health Maintenance: per primary care    Records: Reviewed to assist with preparation for the office visit and are reflected throughout the note.    Follow up: 2 weeks      Education: Please call Monday-Friday for problems or questions and one of the clinical support staff (CSS) will help to get things figured out. The number is [phone]. Some folks are using [facility] to send and e-mail. Please remember some issues require an office visit.     Reviewed the plan of care, provided justification and answered questions with the patient.     SAFETY REMINDERS  No alcohol while taking controlled substances. Alcohol is not an illegal substance, it is unsafe to use in combination. It is a build up of substances in the body that can be extremely hazardous and may cause respirations to slow to a dangerous rate resulting in hospitalization, brain damage, or death.    Opioid  medications have been associated with sharp rise in unintentional overdose and death.  Overdose is a condition characterized by the consumption in excess of a particular drug causing adverse effects. Symptoms of overdose include: breathing slow and shallow, erratic or not at all, pinpoint pupils, hallucinations, confusion, muscle jerks, slack muscles, extreme sleepiness or loss of alertness, awake but not able to talk, face pale or clammy, vomiting, for lighter skinned people, the skin tone turns bluish purple, for darker skinned people, it turns grayish or ashen. If in a situation where overdose is a concern engage the emergency response system (dial 911).    Do not sell, loan, borrow or share your opioid medication with anyone. Deaths have occurred as a result of this practice. It is illegal and patients are being prosecuted.       *Universal Precautions:   UDS/Swab- 10/06/2016   Consent-   Agreement- 4/22/2016  Pharmacy- as documented   - 4/14/2017  Count- n/a  Psychological evaluation n/a  Pharmacogenetic testing- n/a  MME- 45    Management of opioid medication is inherently a moderate to high complex medial interaction based on the risk management required at each contact r/t risks and side effects.    Patient Arrived @ 1100 for a 1120 appointment.     TT: 25 - min  CT: over half spent in education and counseling as outlined in the plan.      Stacie MAIER FNP-C  [address] [place] [address]  [facility]  [phone]  [phone]

## 2021-06-10 NOTE — PROGRESS NOTES
ASSESSMENT/PLAN:       1. Grieving  -Managing but does continue to still have some challenging family dynamics especially now related to her son's death.  Will refer to mental health for further evaluation assessment and treatment.  She is agreeable to this.  She will plan on following up here in the next 4 to 6 weeks for recheck.  - AMB REFERRAL TO MENTAL HEALTH AND ADDICTION  - Adult (18+); Outpatient Treatment; Individual/Couples/Family/Group Therapy/Health Psychology; [facility]; [facility]; We will contact you to schedule the appointment or ple...    2. RSD lower limb, bilateral  -Stable with her nortriptyline, will increase the dose to 50 mg at nighttime as she is still struggling with sleep.  Again follow-up in the next several weeks for recheck.  Additionally needs a letter approving her disability, which she has qualified for through Social Security disability to help with her property taxes.  She will provide me a copy that she had previously that I can base my letter on.    - nortriptyline (PAMELOR) 50 MG capsule; Take 1 capsule (50 mg total) by mouth at bedtime.  Dispense: 30 capsule; Refill: 2    3. Other chronic pulmonary embolism without acute cor pulmonale (H)  -Tolerating the warfarin without difficulty, due for an INR today.  - INR    4. History of blood clots  - INR      No follow-ups on file.        Caitlyn Rees MD       PROGRESS NOTE   8/5/2020    SUBJECTIVE:  Sandy Spencer is a 79 y.o. female  who presents for follow up.     She is moving into a condo in [place]. She doesn't have any friends at her apartment anymore. She does feel scared there all the time and isn't sleeping well either.  She has felt significantly challenged at her current living facility and unsupported.  She also has had difficulty sleeping due to extreme noise.  She is hopeful that moving will help with this.  With her purchasing her new condo she is hopeful to get a letter  "approving her disability.  This evidently offers her outbreak on her outpatient x-rays which I think is reasonable as she did qualify for Social Security disability in 2000.  This is related to her significant debility with her RSD of her lower extremity.    Her blood pressure is high as well in the last several weeks. She has been sitting all day. She took two trazodone when she went to bed and then again at 2:30 and didn't sleep well.  She thinks this is related to her stress both with her son's death, her purchasing a new condo as well as the family dynamics.    She comes in today mainly for a follow up on her mental health.She has felt that her living experience has been challenging at the [facility] currently and she feels unsupported. She does have a lot of anxiety related to her living situation, is unable to sleep there.     Her blood pressure is higher again as she is more anxious. She does wonder if this can be better with having her blood pressure controlled. She does have a headache almost every day. She does have tension, the headache is in the front of her head on the right not where she had the migraines on the top of her head.     She does worry that her memory did get off with COVID. She does have good friends with her [employer] friends, would be agree      Chief Complaint   Patient presents with     Follow-up     One month follow up chronic kidney disease, pain and medications.      INR Check     Patient needing a INR today.      Mental Health Problem     Patient would like to discuss her mental health. Since her son passed she has been having troubles sleeping, has had a increase in blood pressure readings and just feels \"off\".      Letter for School/Work     Patient is needing a letter of disability due to her RSW in regards to moving into a condo.          Patient Active Problem List   Diagnosis     Chronic kidney disease (CKD) stage G3a/A1, moderately decreased glomerular filtration rate (GFR) " between 45-59 mL/min/1.73 square meter and albuminuria creatinine ratio less than 30 mg/g (H)     Focal glomerular sclerosis     RSD lower limb, bilateral     ADD (attention deficit disorder)     RSD upper limb, right     Osteopenia     Major depression     Hypertension     Insomnia     Mixed hyperlipidemia     Right rotator cuff tear     Cluster headaches     Lumbar radiculopathy     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     Temporomandibular joint-pain-dysfunction syndrome (TMJ)     Localized swelling of lower leg     Acquired hypothyroidism     Chronic pain syndrome     Snoring     Abdominal pain, generalized     Dermatochalasis of left eyelid     Bilateral carotid artery stenosis     Coronary artery disease involving native coronary artery of native heart without angina pectoris     Sinus bradycardia     Other chronic pulmonary embolism without acute cor pulmonale (H)     Splenic infarction     Opioid type dependence, continuous (H)     Hematuria     Hydronephrosis     Bladder spasms     Anxiety disorder due to medical condition     Family relationship problem     History of posttraumatic stress disorder (PTSD)     Generalized muscle weakness     Myofascial pain     Moderate major depression (H)     Elevated lipase     Abdominal bloating     Acquired absence of other specified parts of digestive tract     Ampullary stenosis     Obstruction of biliary tree     Anemia     Aphthous ulcer of ileum     Bipolar disorder, unspecified (H)     Disorder of phosphorus metabolism     Edema     Gastroesophageal reflux disease without esophagitis     Obstruction of common bile duct     Overflow diarrhea     History of partial colectomy     Complex regional pain syndrome     Solitary left kidney     Flank pain     Hypocitraturia     Low urine output       Current Outpatient Medications   Medication Sig Dispense Refill     calcium carb/vitamin D3/vit K1 (VIACTIV ORAL) Take 1 tablet by mouth daily.       fentaNYL  (DURAGESIC) 75 mcg/hr Place 1 patch on the skin every other day. 15 patch 0     lamoTRIgine (LAMICTAL) 100 MG tablet One tablet morning, one-half bedtime 135 tablet 3     levothyroxine (LEVO-T) 50 MCG tablet Take 1 tablet (50 mcg total) by mouth Daily at 6:00 am. 90 tablet 3     methocarbamoL (ROBAXIN) 500 MG tablet Take 1 tablet (500 mg total) by mouth 4 (four) times a day. (Patient taking differently: Take 500 mg by mouth 4 (four) times a day. Taking one tablet 3 to 4 times a week.) 60 tablet 1     nortriptyline (PAMELOR) 50 MG capsule Take 1 capsule (50 mg total) by mouth at bedtime. 30 capsule 2     potassium citrate (UROCIT-K) 10 mEq (1,080 mg) SR tablet Take 2 tablets (20 mEq total) by mouth 2 (two) times a day. 360 tablet 1     rosuvastatin (CRESTOR) 40 MG tablet Take 1 tablet (40 mg total) by mouth at bedtime. 90 tablet 3     SUMAtriptan (IMITREX) 50 MG tablet Take 1 tablet (50 mg total) by mouth every 2 (two) hours as needed for migraine. 9 tablet 1     traZODone (DESYREL) 100 MG tablet TAKE 2 TO 4 TABLETS(200  MG) BY MOUTH AT BEDTIME AS NEEDED FOR SLEEP 360 tablet 1     warfarin ANTICOAGULANT (COUMADIN/JANTOVEN) 5 MG tablet Take one to two tablets (5 to 10 mg) by mouth daily. Adjust dose based on INR results as directed. 180 tablet 3     evolocumab 140 mg/mL PnIj Inject 140 mg under the skin every 14 (fourteen) days. 6 mL 3     naloxone (NARCAN) 4 mg/actuation nasal spray 1 spray (4 mg dose) into one nostril for opioid reversal. Call 911. May repeat if no response in 3 minutes. 1 Box 0     sodium phosphates 133 mL (FLEET ENEMA) 19-7 gram/118 mL Enem rectal enema Take as directed by the preparation instructions       valACYclovir (VALTREX) 1000 MG tablet TAKE ONE TABLET BY MOUTH THREE TIMES A DAY FOR 7 DAYS as needed for outbreaks 42 tablet 2     Current Facility-Administered Medications   Medication Dose Route Frequency Provider Last Rate Last Dose     teriparatide injection 20 mcg (FORTEO)  20 mcg  Subcutaneous DAILY Caroline Sumner NP           Social History     Tobacco Use   Smoking Status Former Smoker     Packs/day: 1.00     Years: 20.00     Pack years: 20.00     Last attempt to quit: 2000     Years since quittin.6   Smokeless Tobacco Never Used           OBJECTIVE:        Recent Results (from the past 240 hour(s))   INR   Result Value Ref Range    INR 4.80 (H) 0.90 - 1.10       Vitals:    20 1540   BP: 140/80   Patient Site: Right Arm   Patient Position: Sitting   Cuff Size: Adult Regular   Pulse: 86   SpO2: 98%   Weight: 137 lb 4.8 oz (62.3 kg)     Weight: 137 lb 4.8 oz (62.3 kg)          Physical Exam:  GENERAL APPEARANCE: A&A, NAD, well hydrated, well nourished  SKIN:  Normal skin turgor, no lesions/rashes   HEENT: moist mucous membranes, no rhinorrhea  NECK: Normal  CV: RRR, no M/G/R   LUNGS: CTAB  ABDOMEN: S&NT, no masses  Back: Skin right sided tenderness to palpation over the rib cage  EXTREMITY: no edema   NEURO: no gross deficits   Psych: Her affect is appropriate, she is casually dressed and groomed, her thought process and speech pattern are normal, she is tearful at times

## 2021-06-10 NOTE — TELEPHONE ENCOUNTER
ANTICOAGULATION  MANAGEMENT    Assessment     Today's INR result of 3.9 is Supratherapeutic (goal INR of 2.0-3.0)        Warfarin taken as previously instructed    No new diet changes affecting INR    No new medication/supplements affecting INR    Continues to tolerate warfarin with no reported s/s of bleeding or thromboembolism     Previous INR was Subtherapeutic    Plan:     Spoke with Sandy regarding INR result and instructed:     Warfarin Dosing Instructions:  Hold warfarin tomorrow  then change warfarin dose to 10 mg daily on Mondays and Fridays; and 7.5 mg daily rest of week  (4.2 % change)    Patient dosed her warfarin today already.     Slightly under protocol of 5% change, patient was previously on warfarin 10 mg once a week and was subtherapeutic.      Instructed patient to follow up no later than: 2 weeks     Education provided: importance of therapeutic range, importance of following up for INR monitoring at instructed interval, importance of taking warfarin as instructed, monitoring for bleeding signs and symptoms and when to seek medical attention/emergency care    Sandy verbalizes understanding and agrees to warfarin dosing plan.    Instructed to call the [facility] for any changes, questions or concerns. (#[phone])   ?   Angie Rice RN    Subjective/Objective:      Sandy Spencer, a 79 y.o. female is on warfarin.     Sandy reports:     Home warfarin dose: verbally confirmed home dose with Sandy  and updated on anticoagulation calendar     Missed doses: No     Medication changes:  No     S/S of bleeding or thromboembolism:  No     New Injury or illness:  No     Changes in diet or alcohol consumption:  No     Upcoming surgery, procedure or cardioversion:  No    Anticoagulation Episode Summary     Current INR goal:   2.0-3.0   TTR:   32.7 % (1 y)   Next INR check:   8/10/2020   INR from last check:   3.90! (7/27/2020)   Weekly max warfarin dose:      Target end date:      INR check location:       Preferred lab:      Send INR reminders to:   [facility]    Indications    Other chronic pulmonary embolism without acute cor pulmonale (H) [I27.82]           Comments:            Anticoagulation Care Providers     Provider Role Specialty Phone number    Caitlyn Rees MD Referring Family Medicine [phone]

## 2021-06-10 NOTE — PROGRESS NOTES
The Care Guide returned a phone call from the patient. The patient did not answer. The Care Guide left a message.

## 2021-06-10 NOTE — TELEPHONE ENCOUNTER
Two attempts made to reach the patient today to help her schedule a three week follow up with Dr. Rees.    Voicemail left asking for her to call the clinic back to schedule a in clinic appointment, three weeks out / the week of 9/14 - 9/18. MUST BE A 40 MIN VISIT.     Okay to use any of the following slots if still open for 40 mins.     Tuesday 9/15/20 - 10:20 AM or 1:20 PM  Wednesday 9/16/20 - 10:40 AM or 2:40 PM  Friday 9/18/20 - 10:00 AM

## 2021-06-10 NOTE — PROGRESS NOTES
ASSESSMENT/PLAN:       1. Hypertension  -Blood pressure is only mildly elevated here today but has been quite elevated in other situations.  Given her allergies we are limited with what we can start her on, going to have her trial a low-dose of losartan again as she appears to have done well with this in the past.  She will follow-up here in approximately 1 month for recheck and updated labs at that time.  She is comfortable with this.  - losartan (COZAAR) 25 MG tablet; Take 1 tablet (25 mg total) by mouth daily.  Dispense: 30 tablet; Refill: 1    2. Anxiety and depression  -She does have a therapist who she has not seen in some time.  I provided her with the phone number of her again and she will restart therapy.  Again follow-up in 1 month and we can consider medication at that time if needed.    3. Diarrhea  -She does at times struggle with diarrhea.  She is wondering if there is something she can take for this.  He did provide her with a prescription of Lomotil that she can use as needed, she should follow-up in 1 month.  - diphenoxylate-atropine (LOMOTIL) 2.5-0.025 mg/5 mL liquid; Take 5 mL by mouth 4 (four) times a day as needed.  Dispense: 60 mL; Refill: 0    4. Chronic kidney disease (CKD) stage G3a/A1, moderately decreased glomerular filtration rate (GFR) between 45-59 mL/min/1.73 square meter and albuminuria creatinine ratio less than 30 mg/g  -We will have to watch her closely for worsening kidney function given her history of chronic kidney disease.  Should update her labs at her next visit but he had another good reason to be on an arm.      40 minutes was spent face-to-face with the patient during this encounter and >50% of this was spent on counseling and coordination of care. We discussed in depth the topics listed above.       Caitlyn Rees MD      PROGRESS NOTE   5/3/2017    SUBJECTIVE:  Sandy Spencer is a 76 y.o. female  who presents for follow up.     Her blood pressure has been  high for a while. She would like to have her prescriptions done for 90 days. She was told by her GI doctor that she should not take her furosemide. She takes it only occasionally when she has swelling in her legs. She does have losartan listed as an allergy but she cannot recall what the reaction was. I looked back in her records from [place] and she was on then.  It was listed as an allergy in the [facility] system.  There is no listed reaction from what I could tell.    She is tearful today, she is looking into moving into a new [facility] building. People aren't friendly to her at her current place of living.  She does have a good support system here in [place].    She did take acomprosate which casued her to get violentally ill. She felt dizzy and had nausea and vomiting.  She does not drink any alcohol.  She was prescribed this by the pain clinic to help with her pain.    She did see [facility] for her knees and was told that she cannot have them operated on due to her being high risk. She is unsure why that is.     She went to the GI doctor yesterday as well and they did do her cholesterol there.  She is wondering if I will get those results.  She continues on the statin medication without problems.  Chief Complaint   Patient presents with     Hypertension     Patient is here today as her blood pressure has been high.     Medication Management     Patient would like to discuss her current medications. (Patient request 90 day supply if possible as she does not like to drive)         Patient Active Problem List   Diagnosis     Nausea & vomiting     Chronic kidney disease (CKD) stage G3a/A1, moderately decreased glomerular filtration rate (GFR) between 45-59 mL/min/1.73 square meter and albuminuria creatinine ratio less than 30 mg/g     Focal glomerular sclerosis     Anxiety and depression     RSD lower limb, bilateral     ADD (attention deficit disorder)     Chronic pain     RSD upper limb, right      Other specified hypothyroidism     Anxiety     Osteopenia     Major depression     Hypertension     Insomnia     Mixed hyperlipidemia     Right rotator cuff tear     Cluster headaches     Tachycardia     Hypoxemia     Lumbar radiculopathy     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     Stenosis of lateral recess of lumbar spine     Reflex sympathetic dystrophy     Acute encephalopathy     Temporomandibular joint-pain-dysfunction syndrome (TMJ)     Pancreatitis     Localized swelling of lower leg     Medical cannabis use     Acute right-sided low back pain without sciatica     Acute exacerbation of chronic low back pain     Acute pancreatitis     Accelerated hypertension     Hypothyroidism, unspecified type     Chronic pain syndrome     Non morbid obesity due to excess calories     Snoring       Current Outpatient Prescriptions   Medication Sig Dispense Refill     aspirin 81 MG EC tablet Take 81 mg by mouth daily.       atorvastatin (LIPITOR) 80 MG tablet Take 1 tablet (80 mg total) by mouth at bedtime. 90 tablet 1     azelastine (ASTEPRO) 0.15 % (205.5 mcg) Spry nasal spray 1 spray by Left Nare route 2 (two) times a day as needed.       citalopram (CELEXA) 10 MG tablet Take 1 tablet (10 mg total) by mouth daily. 90 tablet 1     diphenhydrAMINE (BENADRYL) 25 mg capsule Take 25 mg by mouth every 6 (six) hours as needed.        furosemide (LASIX) 40 MG tablet Take 0.5-1 tablets (20-40 mg total) by mouth daily as needed. 90 tablet 1     levothyroxine (SYNTHROID, LEVOTHROID) 50 MCG tablet Take 1 tablet (50 mcg total) by mouth daily. 90 tablet 1     morphine (MSIR) 15 MG tablet Take 1 tablet (15 mg total) by mouth 5 (five) times a day for 14 days. Maximum 5 tablets/day 70 tablet 0     omeprazole (PRILOSEC) 40 MG capsule Take 1 capsule (40 mg total) by mouth daily. 90 capsule 3     SUMAtriptan (IMITREX) 50 MG tablet Take 1 tablet (50 mg total) by mouth every 2 (two) hours as needed for migraine. 27 tablet 0      traZODone (DESYREL) 100 MG tablet Take 2-4 tablets (200-400 mg total) by mouth bedtime. 360 tablet 1     verapamil (CALAN-SR) 240 MG CR tablet Take 1 tablet (240 mg total) by mouth bedtime. 90 tablet 1     colestipol (COLESTID) 1 gram tablet Take 2 g by mouth 2 (two) times a day.        diphenoxylate-atropine (LOMOTIL) 2.5-0.025 mg/5 mL liquid Take 5 mL by mouth 4 (four) times a day as needed. 60 mL 0     fluocinolone acetonide oil (DERMOTIC) 0.01 % Drop Instill one drop 1-2 times a week as needed for itching 20 mL 0     lidocaine (LIDODERM) 5 % Place 1 patch on the skin daily as needed. Remove & Discard patch within 12 hours or as directed by MD 10 patch 11     losartan (COZAAR) 25 MG tablet Take 1 tablet (25 mg total) by mouth daily. 30 tablet 1     OXcarbazepine (TRILEPTAL) 150 MG tablet One daily for four days, one twice a day five days, one three times daily 120 tablet 1     No current facility-administered medications for this visit.        History   Smoking Status     Former Smoker     Packs/day: 1.00     Years: 20.00     Quit date: 1/1/2000   Smokeless Tobacco     Never Used           OBJECTIVE:        Recent Results (from the past 240 hour(s))   Metanephrines, Fractionated, 24 Hour Urine   Result Value Ref Range    Metanephrine 179 mcg/24 h    Normetanephrine 190 mcg/24 h    Total Metanephrines 369 mcg/24 h    Collection Duration 24 h    Urine Volume 900 mL       Vitals:    05/03/17 1010   BP: 146/86   Patient Site: Right Arm   Patient Position: Sitting   Cuff Size: Adult Regular   Pulse: 62   Weight: 167 lb 1.6 oz (75.8 kg)     Weight: 167 lb (75.8 kg)        Physical Exam:  GENERAL APPEARANCE: A&A, NAD, well hydrated, well nourished  SKIN:  Normal skin turgor, no lesions/rashes   HEENT: moist mucous membranes, no rhinorrhea, pharynx unremarkable  NECK: Normal, without lymphadenopathy  CV: RRR, no M/G/R   LUNGS: CTAB  ABDOMEN: S&NT, no masses   EXTREMITY: no edema   NEURO: no gross deficits   Psych: Her  affect is mildly flat, she is casually dressed and groomed, her thought process and speech pattern are normal, she is tearful today

## 2021-06-10 NOTE — PROGRESS NOTES
Subjective:   Sandy Spencer is a 76 y.o. female who presents for evaluation of pain. Patient was last seen 05/08/2017.      Major issues:  1. Reflex sympathetic dystrophy        CC: Pain  See rooming evaluation    HPI:   Impact of pain treatments:   Analgesia: fair   ADL's: She is retired.  She is able to complete some chores pacing herself.  She is able to socialize with friends in a fashionable manner.    AE's: denies   Aberrant behavior: denies    Headache:  Frequency of HA: daily  Duration of HA: days  Quality of HA: severe   Location of HA: occipital area  Severity of HA: moderate to severe   Stimulus for a HA: pain  Aura description: heavyness  #of HA days per mo: (15/mo to consider Botox)  Rehabilitation: none  Interventional: none  Prophylactic treatment: Imitrex  Abortive Medication for HA: Sumatriptan  Management: strategies pain medications    Aggravating factors: lifting, reaching above he head  Alleviating factors: medications  Associated symptoms: increased pain  DME: none  Location/Laterality of the pain: back pain  Timing: constant  Quality: burning, deep ache  Severity:6/10 last OV 7/10    Activities Impaired by Increasing Pain Severity: F= 8  3-Enjoy  4-Work, Enjoy  5-Active, Mood Work Enjoy  6-Sleep, Active, Mood Work Enjoy  7-Walk, Sleep, Active, Mood Work Enjoy  8-Relate, Walk, Sleep, Active, Mood Work Enjoy      Medication: Patient is taking Morphine 15 mg five times a day..     Last opioid dose was Morphine last dose- 1200pm.       Interventional: Has had injections previously and she feels like it assist with pain. She is interested in getting a block injection.     Integrative Approaches: Stay active    Mental Health: She has a psychiatrist that she follows up with as needed    Records: Reviewed to prepare for today's visits and reflected throughout the note.    Additional Problems: increased pain    Diagnostics:   Lab:  Last UDS/SWAB on 10/06/2017 was reviewed and was  "appropriate.      Review of Systems pblm pert  Constitutional- + sleep disturbances, + activity intolerance  Musculoskeletal- + pain  Neuro- - cognitive changes, - radicular, - neuropathic symptoms  GI/-  - constipation, - urinary difficulty  Psych-  + mood disorders,  - taking medication in a fashion other than prescribed    Objective:     Vitals:    05/11/17 1441   BP: 151/77   Pulse: 68   Resp: 14   Weight: 165 lb (74.8 kg)   Height: 5' 3.5\" (1.613 m)   PainSc:   6       Constitutional:  Pleasant and cooperative female who presents alone today.   Psychiatric: Mood and affect are appropriate for the situation, setting and topic of discussion.  Patient does not appear sedated.  Integumentary:  Observed skin WNL  HEENT: EOM's grossly intact.    Chest: Breathing is non-labored.   Neurological:  Alert and oriented in all spheres including: time, place, person and situation.  Durable Medical Equipment: none    Assessment:   Sandy Spencer is a 76 y.o. female seen in clinic today for chronic headache.  Today she reports that her pain is much better that the last time she was seen.  She continues with the morphine and she reports sometimes it take off the edge but does not take away the pain completely.  She has done injections with Dr. Mittal and she feels like it has offered her some pain relief.     She has been diagnosed with pancreatitis and she is scheduled for an a procedure in 1 week.  She has not been able to get an appointment with her PCP to complete pre-op assessment.  I called her PCP office and left a message to contact patient if she can be seen on Monday.  The will contact the patient with the answers.        Plan:   Plan/NextSteps:     Medication:   Medication prescribed today Morphine 15 mg five times a day    REFILL INSTRUCTIONS:  Please contact the clinic refill line 7 days before your refill is due. Speak clearly; note cell phones cut in-and-out and poor quality speech and reception issues will " influence our ability to hear you and be efficient with your prescription.     Call [phone] leave:   Your name (first and last w/ spelling)   Date of birth  Name of all the medication(s) being requested  Dose of the medication(s)   How you are taking the medication (eg. twice per day etc).     Contact your pharmacy 3 (three) days after leaving your message to see if your prescription has been received. Please request the pharmacy check your profile to be certain about any concerns with a script failing to be received. Note: Ashley updates have been inconsistent.  If the script has not been received there may have been a problem with the communication please reach back out to the clinic.     Integrative Approaches:     Consultation: Stay active    Mental Health: Follow up with you therapist as needed.     Health Maintenance: per primary care    Records: Reviewed to assist with preparation for the office visit and are reflected throughout the note.    Follow up: 1 month      Education: Please call Monday-Friday for problems or questions and one of the clinical support staff (CSS) will help to get things figured out. The number is [phone]. Some folks are using [employer] to send and e-mail. Please remember some issues require an office visit.     Reviewed the plan of care, provided justification and answered questions with the patient.     SAFETY REMINDERS  No alcohol while taking controlled substances. Alcohol is not an illegal substance, it is unsafe to use in combination. It is a build up of substances in the body that can be extremely hazardous and may cause respirations to slow to a dangerous rate resulting in hospitalization, brain damage, or death.    Opioid medications have been associated with sharp rise in unintentional overdose and death.  Overdose is a condition characterized by the consumption in excess of a particular drug causing adverse effects. Symptoms of overdose include: breathing slow and  shallow, erratic or not at all, pinpoint pupils, hallucinations, confusion, muscle jerks, slack muscles, extreme sleepiness or loss of alertness, awake but not able to talk, face pale or clammy, vomiting, for lighter skinned people, the skin tone turns bluish purple, for darker skinned people, it turns grayish or ashen. If in a situation where overdose is a concern engage the emergency response system (dial 911).    Do not sell, loan, borrow or share your opioid medication with anyone. Deaths have occurred as a result of this practice. It is illegal and patients are being prosecuted.       *Universal Precautions:   UDS/Swab- 06/06/2016   Consent-   Agreement- 04/14/2017  Pharmacy- as documented   - 05/11/2017  Count- n/a  Psychological evaluation n/a  Pharmacogenetic testing- n/a  MME- 77    Management of opioid medication is inherently a moderate to high complex medial interaction based on the risk management required at each contact r/t risks and side effects.    Patient Arrived @ 1433 for a 1440 appointment.     TT: 25 - min  CT: over half spent in education and counseling as outlined in the plan.      Stacie MAIER FNP-C  [address] [place] [address]  [facility]  [phone]  [phone]

## 2021-06-10 NOTE — TELEPHONE ENCOUNTER
Spoke with patient, her INR was addressed yesterday. Confirmed with the patient she reported results WRONG to [employer], her true INR result 3.9 on 7/27, recapped previously plan. No further changes at this time.    Angie Rice RN

## 2021-06-11 NOTE — PROGRESS NOTES
ASSESSMENT/PLAN:       1. Rash  -Unsure etiology, will update labs as listed below.  Certainly would be concerned about side effects from warfarin.  Will need to continue to monitor and if things are worsening she will let me know.  - HM2(CBC w/o Differential)  - Comprehensive Metabolic Panel    2. Memory loss  -Discussed that this is still likely secondary to medications.  We discussed to wean for her nortriptyline, she will go to 20 mg orally daily for 1 week 10 mg orally daily for 1 week and then she will stop it.  We will reassess her memory at that time.  If it continues to be poor we can refer for neuropsych testing.    3. RSD lower limb, bilateral  -Things are flared right now likely secondary to her move, weaning off of the nortriptyline    4. Other chronic pulmonary embolism without acute cor pulmonale (H)  -Stable without symptoms, doing well on anticoagulation  - warfarin ANTICOAGULANT (COUMADIN) 2.5 MG tablet; Take 1 tablet (2.5 mg total) by mouth See Admin Instructions. Take 2.5 to 5 mg daily, adjusted for INR  Dispense: 30 tablet; Refill: 11  - INR    5. History of blood clots  - warfarin ANTICOAGULANT (COUMADIN) 2.5 MG tablet; Take 1 tablet (2.5 mg total) by mouth See Admin Instructions. Take 2.5 to 5 mg daily, adjusted for INR  Dispense: 30 tablet; Refill: 11  - INR    6. Splenic infarction  - warfarin ANTICOAGULANT (COUMADIN) 2.5 MG tablet; Take 1 tablet (2.5 mg total) by mouth See Admin Instructions. Take 2.5 to 5 mg daily, adjusted for INR  Dispense: 30 tablet; Refill: 11    7. Chronic pain syndrome  -Stable, renewal of her Imitrex today  - SUMAtriptan (IMITREX) 50 MG tablet; Take 1 tablet (50 mg total) by mouth every 2 (two) hours as needed for migraine.  Dispense: 9 tablet; Refill: 5    8. Immunization due  - Influenza,Quad,High Dose,PF 65 YR+      No follow-ups on file.      Caitlyn Rees MD      PROGRESS NOTE   9/16/2020    SUBJECTIVE:  Sandy Spencer is a 80 y.o. female  who  presents for follow up.     She does have a rash on her right ankle, medially. She does get this at times. She does not have itching with this. She has not tried anything for this. She thinks it started two days ago, did sit a lot two days before this. They did have a  this weekend for her son. His death is still being investigated at this time.     She does need warfarin 2.5 mg filled.  She is generally doing well with her anticoagulation.    She did not hear from the therapist or had difficulty getting in contact with her.  She continues to struggle with her emotions.    She does have more pain with her legs as well, hasn't been in to see PT. She does want to try being off of the nortriptyline, she does note that it isn' theping and she does have some weight gain as well.       Chief Complaint   Patient presents with     Pain     Three week follow up RSD lower limb, bilateral. Patient has been taping her right leg as it has been painful and swollen. Patient did just move this weekend.      Memory Loss     Three week follow up. Patient feels her memory is still about the same as three weeks ago.          Patient Active Problem List   Diagnosis     Chronic kidney disease (CKD) stage G3a/A1, moderately decreased glomerular filtration rate (GFR) between 45-59 mL/min/1.73 square meter and albuminuria creatinine ratio less than 30 mg/g (H)     Focal glomerular sclerosis     RSD lower limb, bilateral     ADD (attention deficit disorder)     RSD upper limb, right     Osteopenia     Major depression     Hypertension     Insomnia     Mixed hyperlipidemia     Right rotator cuff tear     Cluster headaches     Lumbar radiculopathy     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     Temporomandibular joint-pain-dysfunction syndrome (TMJ)     Localized swelling of lower leg     Acquired hypothyroidism     Chronic pain syndrome     Snoring     Abdominal pain, generalized     Dermatochalasis of left eyelid     Bilateral  carotid artery stenosis     Coronary artery disease involving native coronary artery of native heart without angina pectoris     Sinus bradycardia     Other chronic pulmonary embolism without acute cor pulmonale (H)     Splenic infarction     Opioid type dependence, continuous (H)     Hematuria     Hydronephrosis     Bladder spasms     Anxiety disorder due to medical condition     Family relationship problem     History of posttraumatic stress disorder (PTSD)     Generalized muscle weakness     Myofascial pain     Moderate major depression (H)     Elevated lipase     Abdominal bloating     Acquired absence of other specified parts of digestive tract     Ampullary stenosis     Obstruction of biliary tree     Anemia     Aphthous ulcer of ileum     Bipolar disorder, unspecified (H)     Disorder of phosphorus metabolism     Edema     Gastroesophageal reflux disease without esophagitis     Obstruction of common bile duct     Overflow diarrhea     History of partial colectomy     Complex regional pain syndrome     Solitary left kidney     Flank pain     Hypocitraturia     Low urine output       Current Outpatient Medications   Medication Sig Dispense Refill     calcium carb/vitamin D3/vit K1 (VIACTIV ORAL) Take 1 tablet by mouth daily.       evolocumab 140 mg/mL PnIj Inject 140 mg under the skin every 14 (fourteen) days. 6 mL 3     fentaNYL (DURAGESIC) 75 mcg/hr Place 1 patch on the skin every other day. 15 patch 0     lamoTRIgine (LAMICTAL) 100 MG tablet One and one-half tab morning, one bedtime 75 tablet 2     levothyroxine (LEVO-T) 50 MCG tablet Take 1 tablet (50 mcg total) by mouth Daily at 6:00 am. 90 tablet 3     methocarbamoL (ROBAXIN) 500 MG tablet Take 1 tablet (500 mg total) by mouth 4 (four) times a day. (Patient taking differently: Take 500 mg by mouth 4 (four) times a day. Taking one tablet 3 to 4 times a week.) 60 tablet 1     naloxone (NARCAN) 4 mg/actuation nasal spray 1 spray (4 mg dose) into one nostril  for opioid reversal. Call 911. May repeat if no response in 3 minutes. 1 Box 0     nortriptyline (PAMELOR) 10 MG capsule Take 4 capsules (40 mg total) by mouth at bedtime. 120 capsule 1     potassium citrate (UROCIT-K) 10 mEq (1,080 mg) SR tablet Take 2 tablets (20 mEq total) by mouth 2 (two) times a day. 360 tablet 1     rosuvastatin (CRESTOR) 40 MG tablet Take 1 tablet (40 mg total) by mouth at bedtime. 90 tablet 3     sodium phosphates 133 mL (FLEET ENEMA) 19-7 gram/118 mL Enem rectal enema Take as directed by the preparation instructions       SUMAtriptan (IMITREX) 50 MG tablet Take 1 tablet (50 mg total) by mouth every 2 (two) hours as needed for migraine. 9 tablet 5     traZODone (DESYREL) 100 MG tablet TAKE 2 TO 4 TABLETS(200  MG) BY MOUTH AT BEDTIME AS NEEDED FOR SLEEP (Patient taking differently: Take 400 mg by mouth at bedtime. TAKE  4 TABLETS(400 MG) BY MOUTH AT BEDTIME AS NEEDED FOR SLEEP) 360 tablet 1     valACYclovir (VALTREX) 1000 MG tablet TAKE ONE TABLET BY MOUTH THREE TIMES A DAY FOR 7 DAYS as needed for outbreaks 42 tablet 2     warfarin ANTICOAGULANT (COUMADIN/JANTOVEN) 5 MG tablet Take one to two tablets (5 to 10 mg) by mouth daily. Adjust dose based on INR results as directed. 180 tablet 3     warfarin ANTICOAGULANT (COUMADIN) 2.5 MG tablet Take 1 tablet (2.5 mg total) by mouth See Admin Instructions. Take 2.5 to 5 mg daily, adjusted for INR 30 tablet 11     Current Facility-Administered Medications   Medication Dose Route Frequency Provider Last Rate Last Dose     teriparatide injection 20 mcg (FORTEO)  20 mcg Subcutaneous DAILY Caroline Sumner NP           Social History     Tobacco Use   Smoking Status Former Smoker     Packs/day: 1.00     Years: 20.00     Pack years: 20.00     Last attempt to quit: 2000     Years since quittin.7   Smokeless Tobacco Never Used           OBJECTIVE:        Recent Results (from the past 240 hour(s))   HM2(CBC w/o Differential)   Result Value  Ref Range    WBC 4.2 4.0 - 11.0 thou/uL    RBC 3.43 (L) 3.80 - 5.40 mill/uL    Hemoglobin 11.0 (L) 12.0 - 16.0 g/dL    Hematocrit 33.2 (L) 35.0 - 47.0 %    MCV 97 80 - 100 fL    MCH 32.0 27.0 - 34.0 pg    MCHC 33.1 32.0 - 36.0 g/dL    RDW 11.8 11.0 - 14.5 %    Platelets 169 140 - 440 thou/uL    MPV 7.3 7.0 - 10.0 fL   Comprehensive Metabolic Panel   Result Value Ref Range    Sodium 139 136 - 145 mmol/L    Potassium 4.2 3.5 - 5.0 mmol/L    Chloride 102 98 - 107 mmol/L    CO2 28 22 - 31 mmol/L    Anion Gap, Calculation 9 5 - 18 mmol/L    Glucose 73 70 - 125 mg/dL    BUN 21 8 - 28 mg/dL    Creatinine 1.33 (H) 0.60 - 1.10 mg/dL    GFR MDRD Af Amer 47 (L) >60 mL/min/1.73m2    GFR MDRD Non Af Amer 39 (L) >60 mL/min/1.73m2    Bilirubin, Total 0.7 0.0 - 1.0 mg/dL    Calcium 8.9 8.5 - 10.5 mg/dL    Protein, Total 6.2 6.0 - 8.0 g/dL    Albumin 3.9 3.5 - 5.0 g/dL    Alkaline Phosphatase 48 45 - 120 U/L    AST 30 0 - 40 U/L    ALT 23 0 - 45 U/L   INR   Result Value Ref Range    INR 1.10 0.90 - 1.10       Vitals:    09/16/20 1107   BP: 124/62   Patient Site: Left Arm   Patient Position: Sitting   Cuff Size: Adult Regular   Pulse: 88   SpO2: 98%   Weight: 141 lb 4.8 oz (64.1 kg)     Weight: 141 lb 4.8 oz (64.1 kg)          Physical Exam:  GENERAL APPEARANCE: A&A, NAD, well hydrated, well nourished  SKIN:  Normal skin turgor, she does have a rash present on bilateral anterior shins right worse than left that appears almost petechial, it is nonblanching and nonpalpable  HEENT: moist mucous membranes, no rhinorrhea  NECK: Normal  Psych: Her affect is stable, eye contact is normal, she is not tearful today  EXTREMITY: no edema   NEURO: no gross deficits

## 2021-06-11 NOTE — TELEPHONE ENCOUNTER
ANTICOAGULATION  MANAGEMENT    Assessment     Today's INR result of 1.10 is Subtherapeutic (goal INR of 2.0-3.0)        Less warfarin taken than instructed which may be affecting INR    No new diet changes affecting INR    No new medication/supplements affecting INR    Continues to tolerate warfarin with no reported s/s of bleeding or thromboembolism     Previous INR was Supratherapeutic    Plan:     Spoke on phone with Sandy regarding INR result and instructed:     Warfarin Dosing Instructions:  Continue current warfarin dose 5 mg daily on ; and 7.5 mg daily rest of week  (0 % change)    Patient reports she took warfarin 10 mg today already since her INR was low, writer did not instruct to take any further booster dose. We went over tablet sizes and colors, maria was able to verablly tell writer how much to take and what days. Noticed she has been having some trouble with warfarin management, had a lot of stressors with son passing/ happening. Will continue to monitor this, send to PCP if having further issues to see if any further coordination is necessary.     Instructed patient to follow up no later than:     Education provided: importance of therapeutic range, importance of following up for INR monitoring at instructed interval, importance of taking warfarin as instructed and monitoring for clotting signs and symptoms    Sandy verbalizes understanding and agrees to warfarin dosing plan.    Instructed to call the [facility] for any changes, questions or concerns. (#[phone])   ?   Angie Rice RN    Subjective/Objective:      Sandy Stoutton, a 80 y.o. female is on warfarin.     Sandy reports:     Home warfarin dose: verbally confirmed home dose with Sandy  and updated on anticoagulation calendar     Missed doses: No     Medication changes:  No     S/S of bleeding or thromboembolism:  No     New Injury or illness:  No     Changes in diet or alcohol consumption:  No     Upcoming  surgery, procedure or cardioversion:  No    Anticoagulation Episode Summary     Current INR goal:   2.0-3.0   TTR:   23.6 % (1 y)   Next INR check:   9/21/2020   INR from last check:   1.10! (9/16/2020)   Weekly max warfarin dose:      Target end date:      INR check location:      Preferred lab:      Send INR reminders to:   [facility]    Indications    Other chronic pulmonary embolism without acute cor pulmonale (H) [I27.82]           Comments:            Anticoagulation Care Providers     Provider Role Specialty Phone number    Caitlyn Rees MD Referring Family Medicine [phone]
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
## 2021-06-11 NOTE — TELEPHONE ENCOUNTER
Pt states pharm was able to refill med for her and she had no other questions.  Pt is due to see Dr Ybarra in November so call transferred to scheduling.  -lindy

## 2021-06-11 NOTE — TELEPHONE ENCOUNTER
Who is calling:  Sandy  Reason for Call:  Patient returning call to [facility]  Date of last appointment with primary care: 9/16/2020  Okay to leave a detailed message: Yes

## 2021-06-11 NOTE — TELEPHONE ENCOUNTER
"Pt LVM stating she \"lost\" her newly refilled supply of evolocumab 140 mg/mL PnIj when she moved.    LM for pt to call [phone].  -[facility]  "
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
## 2021-06-11 NOTE — PROGRESS NOTES
Subjective:   Sandy Spencer is a 76 y.o. female who presents for evaluation of pain. Patient was last seen 06/13/2017.      Major issues:  1. RSD lower limb, bilateral        CC: Pain  See rooming evaluation    HPI:   Impact of pain treatments:   Analgesia: poor   ADL's: Work: she is retired. Household: not able to participate in chores. Social: not able to socialize in a fashionable manner.   AE's: denies   Aberrant behavior: denies    Headache:  Frequency of HA: daily  Duration of HA: days  Quality of HA: severe   Location of HA: occipital area  Severity of HA: moderate to severe   Stimulus for a HA: pain  Aura description: heavyness  #of HA days per mo: (15/mo to consider Botox)  Rehabilitation: none  Interventional: none  Prophylactic treatment: Imitrex  Abortive Medication for HA: Sumatriptan  Management: strategies pain medications    Aggravating factors: lifting, reaching above the head  Alleviating factors: medications, hot baths  Associated symptoms: increased pain  DME: none  Location/Laterality of the pain: back pain, leg pain  Timing: Constant  Quality: Sharp, burning  Severity:6/10    Activities Impaired by Increasing Pain Severity: F= 7  3-Enjoy  4-Work, Enjoy  5-Active, Mood Work Enjoy  6-Sleep, Active, Mood Work Enjoy  7-Walk, Sleep, Active, Mood Work Enjoy  8-Relate, Walk, Sleep, Active, Mood Work Enjoy      Medication: Patient is taking Oxycodone 20 mg 4 times a day, Sumatriptan 50 mg tablet every 2 hours as needed for pain,  gabapentin 300 mg 3 times a day, trazodone 100 mg take 2-4 tablets at night..     Last opioid dose was Oxycodone last dose- 6/27/17 @ 1230pm.       Interventional: Has had injections previously and she feels like it assist with pain. She is interested in getting a block injection.     Integrative Approaches: Stay active     Mental Health: She has a psychiatrist that she follows up with as needed     Records: Reviewed to prepare for today's visits and reflected throughout  "the note.        Additional  Pain    Labs:   Last UDS/SWAB on 06/27/2017 results are appropriate.  Acetaminophen with codeine.  Positive morphine and hydromorphone.  Morphine positive, patient taking morphine, expected.  Neurontin, positive, expected, on med list, Oxycodone, patient taking Oxycodone, expected, on med list, valium negative, taking for a procedure, expected.    Review of Systems   Constitutional- + sleep disturbances, + activity intolerance  Musculoskeletal- + pain  Neuro- - cognitive changes, + radicular, + neuropathic symptoms  GI/-  - constipation, - urinary difficulty  Psych-  + mood disorders,  - taking medication in a fashion other than prescribed    Objective:     Vitals:    06/27/17 1429   BP: 139/75   Pulse: 77   Resp: 16   Weight: 164 lb (74.4 kg)   Height: 5' 3.5\" (1.613 m)   PainSc:   6       Constitutional:  Pleasant and cooperative female who presents alone today.   Psychiatric: Mood and affect are appropriate for the situation, setting and topic of discussion.  Patient does not appear sedated.  Integumentary:  Observed skin WNL  HEENT: EOM's grossly intact.    Chest: Breathing is non-labored.   Neurological:  Alert and oriented in all spheres including: time, place, person and situation.  Durable Medical Equipment: none    Assessment:   Sandy Spencer is a 76 y.o. female seen in clinic today for chronic pain relates to lower extremity chronic regional pain syndrome, chronic headaches, more recently abdominal pain with recurrent pancreatitis. Today she reports her pain is terrible.  She is not sure if her medications are assistive with the pain. She reports the weather affects her pain especially the rain, wind, and rain.  She recalls when she was in [place] her pain was much beter but she cannot go back because she cannot afford.  Last office visit with Dr. Lynn she had her medications switched from morphine to Oxycodone.  Today she is asking to switch back to morphine with the " same MME since morphine was cheaper compared to Oxycodone.  She is not sure if morphine was effective but she is hoping with increased MME she will be able to assist.      She is interested in some injections with Dr. Mittal and she will schedule an appointment.    She will follow up with me in 3 weeks and with Dr. Lynn next available.      Plan:   Plan/NextSteps:     Medication:   Medication prescribed today Morphine 30 mg IR 4 times a day.     REFILL INSTRUCTIONS:  Please contact the clinic refill line 7 days before your refill is due. Speak clearly; note cell phones cut in-and-out and poor quality speech and reception issues will influence our ability to hear you and be efficient with your prescription.     Call [phone] leave:   Your name (first and last w/ spelling)   Date of birth  Name of all the medication(s) being requested  Dose of the medication(s)   How you are taking the medication (eg. twice per day etc).     Contact your pharmacy 3 (three) days after leaving your message to see if your prescription has been received. Please request the pharmacy check your profile to be certain about any concerns with a script failing to be received. Note: Ashley updates have been inconsistent.  If the script has not been received there may have been a problem with the communication please reach back out to the clinic.         Integrative Approaches: Stay active     Mental Health: Follow up with you therapist as needed.     Health Maintenance: per primary care     Records: Reviewed to assist with preparation for the office visit and are reflected throughout the note.     Health Maintenance: per primary care    Diagnostics: UDS/SWAB collected 06/27/2017 results are appropriate.    UDS/SWAB:  Patient required a random Urine Drug Screen, due to the need to comply with [place] Model Policy Guidelines for the use of any controlled substances. This is to ensure that patient is compliant with treatment, and to  diminish diversion, abuse, or any other aberrant behaviors. Patient is either being considered for or taking a controlled substance. Unexpected findings will be discussed and treatment decision may be adjusted.       Records: Reviewed to assist with preparation for the office visit and are reflected throughout the note.    Follow up: with Dr. Lynn   (3 weeks Felicia CHAKRABORTY)    Education: Please call Monday-Friday for problems or questions and one of the clinical support staff (CSS) will help to get things figured out. The number is [phone]. Some folks are using [employer] to send and e-mail. Please remember some issues require an office visit.     Reviewed the plan of care, provided justification and answered questions with the patient.     SAFETY REMINDERS  No alcohol while taking controlled substances. Alcohol is not an illegal substance, it is unsafe to use in combination. It is a build up of substances in the body that can be extremely hazardous and may cause respirations to slow to a dangerous rate resulting in hospitalization, brain damage, or death.    Opioid medications have been associated with sharp rise in unintentional overdose and death.  Overdose is a condition characterized by the consumption in excess of a particular drug causing adverse effects. Symptoms of overdose include: breathing slow and shallow, erratic or not at all, pinpoint pupils, hallucinations, confusion, muscle jerks, slack muscles, extreme sleepiness or loss of alertness, awake but not able to talk, face pale or clammy, vomiting, for lighter skinned people, the skin tone turns bluish purple, for darker skinned people, it turns grayish or ashen. If in a situation where overdose is a concern engage the emergency response system (dial 911).    Do not sell, loan, borrow or share your opioid medication with anyone. Deaths have occurred as a result of this practice. It is illegal and patients are being prosecuted.       *Universal  Precautions:   UDS/Swab- 06/06/2016   Consent-   Agreement- 04/14/2017  Pharmacy- as documented   - 05/11/2017  Count- n/a  Psychological evaluation n/a  Pharmacogenetic testing- n/a  MME- 120    Management of opioid medication is inherently a moderate to high complex medial interaction based on the risk management required at each contact r/t risks and side effects.    Patient Arrived @ 1352 for a 1420 appointment.     TT: 40 - min  CT: over half spent in education and counseling as outlined in the plan.      Stacie Damon APRN FNP-C  [address] [place] [address]  [facility]  [phone]  [phone]

## 2021-06-11 NOTE — PROGRESS NOTES
Order for Durable Medical Equipment was processed and equipment ordered.   DME provider: [facility]  Date Faxed: 07/19/2017  Ordering Provider: Dr. Kyle  Equipment ordered: cpap

## 2021-06-11 NOTE — PATIENT INSTRUCTIONS - HE
Please call to reschedule your mental health appointment: [phone]    Please wean down on the nortriptyline: take 20 mg daily, two pills, for one week, then 10 mg daily, one pill, for one week and then you can stop.

## 2021-06-11 NOTE — TELEPHONE ENCOUNTER
ANTICOAGULATION  MANAGEMENT    Assessment     Today's INR result of 1.2 is Subtherapeutic (goal INR of 2.0-3.0)        Warfarin taken as previously instructed    No new diet changes affecting INR    No new medication/supplements affecting INR    Continues to tolerate warfarin with no reported s/s of bleeding or thromboembolism     Previous INR was Therapeutic    Plan:     Spoke on phone with Sandy regarding INR result and instructed:     Warfarin Dosing Instructions:  Take one time booster dose of warfarin 10 mg  then change warfarin dose to 7.5 mg daily on Tuesdays, Thursdays, and Saturdays; and 5 mg daily rest of week  (13.3 % change)    Patient verbally was able to repeat plan     Instructed patient to follow up no later than: 10/2 (to ensure patient is trending up)     Education provided: importance of therapeutic range, importance of following up for INR monitoring at instructed interval, importance of taking warfarin as instructed, importance of notifying clinic for changes in medications, when to seek medical attention/emergency care and importance of notifying clinic for diarrhea, nausea/vomiting, reduced intake and/or illness    Sandy verbalizes understanding and agrees to warfarin dosing plan.    Instructed to call the [facility] for any changes, questions or concerns. (#[phone])   ?   Aye Rice RN    Subjective/Objective:      Sandy ZIMMERMAN Tj, a 80 y.o. female is on warfarin.     Sandy reports:     Home warfarin dose: verbally confirmed home dose with Sandy  and updated on anticoagulation calendar     Missed doses: No     Medication changes:  No     S/S of bleeding or thromboembolism:  No     New Injury or illness:  No     Changes in diet or alcohol consumption:  No     Upcoming surgery, procedure or cardioversion:  No    Anticoagulation Episode Summary     Current INR goal:   2.0-3.0   TTR:   22.2 % (1 y)   Next INR check:   10/2/2020   INR from last check:   1.20! (9/28/2020)   Weekly max  warfarin dose:      Target end date:      INR check location:      Preferred lab:      Send INR reminders to:   [facility]    Indications    Other chronic pulmonary embolism without acute cor pulmonale (H) [I27.82]           Comments:            Anticoagulation Care Providers     Provider Role Specialty Phone number    Caitlyn Rees MD Referring Family Medicine [phone]

## 2021-06-11 NOTE — TELEPHONE ENCOUNTER
Medication being requested: Fentanyl 75 mcg patch  Last visit date: 8/10    Provider: BE  Next visit date: 10/6   Provider: JUANA  Expected follow up: 6-8 weeks  [facility] visit ([facility]) date: mill[facility]ent  UDT date: 2/2020  Agreement date: 2/2020   (Last fill date; name; strength; provider; MME; quantity):   reviewed: Last filled 8/31, quantity # 14 patches, 28 day supply, refill due 9/28  Pertinent between visit information about requested medication (telephone, mychart, prior authorization, concerns, comments): Continue with the fentanyl 75 mcg every 48 hours  Script being sent to provider by nurse- dates and quantity:   Requested Prescriptions     Pending Prescriptions Disp Refills     fentaNYL (DURAGESIC) 75 mcg/hr 14 patch 0     Sig: Place 1 patch on the skin every other day.   Pharmacy cued: [facility]  Standing orders for withdrawal protocol implemented: millicent

## 2021-06-11 NOTE — TELEPHONE ENCOUNTER
ANTICOAGULATION  MANAGEMENT    Assessment     Today's INR result of 2.0 is Therapeutic (goal INR of 2.0-3.0)        Less warfarin taken than instructed which may be affecting INR    No new diet changes affecting INR    No new medication/supplements affecting INR    Continues to tolerate warfarin with no reported s/s of bleeding or thromboembolism     Previous INR was Subtherapeutic    Plan:     Spoke on phone with Sandy regarding INR result and instructed:     Warfarin Dosing Instructions:  Continue current warfarin dose 7.5 mg daily on Thursdays; and 5 mg daily rest of week  (0 % change)    This is a 25% drop from what patient was instructed to do... She expresses certainty that this is how she took her warfarin     Instructed patient to follow up no later than: one week     Education provided: importance of therapeutic range, importance of following up for INR monitoring at instructed interval, importance of taking warfarin as instructed, importance of notifying clinic for changes in medications, when to seek medical attention/emergency care and importance of notifying clinic for diarrhea, nausea/vomiting, reduced intake and/or illness    Sandy verbalizes understanding and agrees to warfarin dosing plan.    Instructed to call the [facility] for any changes, questions or concerns. (#[phone])   ?   Angie Rice RN    Subjective/Objective:      Sandy ZIMMERMAN Tj, a 80 y.o. female is on warfarin.     Sandy reports:     Home warfarin dose: verbally confirmed home dose with Sandy  and updated on anticoagulation calendar     Missed doses: No     Medication changes:  No     S/S of bleeding or thromboembolism:  No     New Injury or illness:  No     Changes in diet or alcohol consumption:  No     Upcoming surgery, procedure or cardioversion:  No    Anticoagulation Episode Summary     Current INR goal:   2.0-3.0   TTR:   22.6 % (1 y)   Next INR check:   9/28/2020   INR from last check:   2.00 (9/21/2020)   Weekly max  warfarin dose:      Target end date:      INR check location:      Preferred lab:      Send INR reminders to:   [facility]    Indications    Other chronic pulmonary embolism without acute cor pulmonale (H) [I27.82]           Comments:            Anticoagulation Care Providers     Provider Role Specialty Phone number    Caitlyn Rees MD Referring Family Medicine [phone]

## 2021-06-11 NOTE — PROGRESS NOTES
DATE OF SERVICE: 2017    Sandy reviews times where her legs feel swollen and burning.  She has pain in her  foot with chronic regional pain symptoms.    She was seen by [facility].  She has been told that there is bone-on-bone,  though they do not want to do surgery due to history of RSD.  She has been referred  to physical therapy, who suggested she see somebody named Yogi Guo who is familiar  with RSD.    She has continued working with the [facility] [facility], having another stent placed for the  pancreatitis.  She notes 1 or 2 weeks later the pain returns, and they apparently  cannot put in a permanent stent.    She has not had contact with her family.    She describes falling and hitting her left ribs on something, going to the emergency  room and having negative x-rays.    Reports being scheduled for a sleep study in July.  It was noted in the hospital,  she had decreased heart rate of 50 and oxygen to 80%.    She reviews her daughter-in-law  of pancreatitis 3 years ago and had 17 stents,  so she is concerned.    She continues with Celexa 5 mg a day may be having some sweating.    Reviewed that she had gallbladder surgery in [place].  We discussed that opioids can  be affecting the bile duct and she notes that she does not think there is a  correlation.    She continues on gabapentin 600 mg 3 times a day, unclear benefit.    She notes a trial of acamprosate, poor reaction, did not tolerate that well.    She has not been using the oxcarbazepine.    Reviewed her history of opioids, Dilaudid and methadone did not help.  Morphine  seemed to cause restlessness in her sleep.    She has read about morphine, concerned about kidney disease, pancreas and  gallbladder concerns.  Reviewed the effects on the sphincters can be the case with  all opioids.    Blood pressure 144/76, pulse 77, pain score 7.  She is alert with a clear sensorium,  appropriately groomed.  Thought process tight and logical.  On  exam, her ankles are  somewhat swollen, there is no change in color, temperature.  She is wearing sandals.    IMPRESSION:  Chronic pain relates to lower extremity chronic regional pain syndrome,  chronic headaches, more recently abdominal pain with recurrent pancreatitis.    PLAN:  I encouraged her to follow through with a sleep study given that we have used  opioids; sounds as if she is desaturating.    Reviewed the new referral to physical therapy.    We will transition from the morphine and oxycodone.  She does not recall using this;  is concerned that the morphine may be causing restless sleep.    She will discontinue the Citalopram as that may be causing sweating.  She does  recall taking Lexapro in the past, was unclear of the affect.    Time spent 25 minutes face to face, more than 50% counseling about above condition  and coordination of treatment plan.      MD sujata MCDERMOTT 06/21/2017 19:10:28  T 06/22/2017 08:06:41  R 06/22/2017 08:06:41  [id number]        cc:SANJUANITA ZAFAR MD

## 2021-06-11 NOTE — TELEPHONE ENCOUNTER
Patient was scheduled for an initial intake virtual visit on 9/10/20. Therapist called and there was no answer. Therapist left voice message for patient to call behavioral access scheduling line in order to set up a future appointment.

## 2021-06-11 NOTE — PROGRESS NOTES
ASSESSMENT/PLAN:       1. Nausea & vomiting  -Renewing her Zofran today for as needed use. Updating some labs as well and encouraged her to follow up with GI for further evaluation and assessment.   - ondansetron (ZOFRAN-ODT) 4 MG disintegrating tablet; Take 1 tablet (4 mg total) by mouth every 8 (eight) hours as needed for nausea.  Dispense: 15 tablet; Refill: 3    2. Acute pancreatitis, unspecified complication status, unspecified pancreatitis type  -Updating labs today. Will refer to nutrition to see if they can help her eat for her likely chronic pancreatitis. I did provide her with information on low fat diet for now.   -Defer management of suspected chronic pancreatitis to GI, she does have an appointment coming up with them.   - Comprehensive Metabolic Panel  - Lipase  - Amylase  - HM1(CBC and Differential)  - Glycosylated Hemoglobin A1c  - Ambulatory referral to Nutrition Services  - HM1 (CBC with Diff)    3. Mixed hyperlipidemia  -Updating lipids today as we adjusted her atorvastatin  - Lipid Cascade    4. Chronic pain syndrome  -She continues to struggle quite a bit with the chronic pain. She is working closely with the pain clinic but this does affect her mood. She does have a mental health provider there as well.     5. Anxiety and depression  -She has had a flare of her depression lately. She does have a good support system with her [place], but is unfortunately estranged from her family. She has not tolerated a lot of anti-depressants in the past. Will need to watch closely and consider referral to psychiatry if things continue to worsen.     30 minutes was spent face-to-face with the patient during this encounter and >50% of this was spent on counseling and coordination of care. We discussed in depth the topics listed above.       Caitlyn Rees MD      PROGRESS NOTE   7/12/2017    SUBJECTIVE:  Sandy Spencer is a 76 y.o. female  who presents for follow up.     She just had an injection at  the pain center for her right sided RSD. She is seeing Yogi Guo a physical therapist at [facility], he is a myofascial pain therapist at the end of the month. She is having good results with her kinesotape. She continues to struggle with her pain and is frustrated with the pain and feels like it's not worth living. She doesn't want to commit suicide. She is seeing a mental health provider at the pain clinic as well.     She continues to vomit bile, she continues to feel like her pancreatitis is flaring. She doesn't throw up generally but can at times. She has been eating things like spaghetti, beans, lasagna, she is nearly a vegetarian because it seems like meats don't sit well for her. She is frustrated because all she wants to do is lay around. She doesn't eat much but is interested in looking into what she can eat for her chronic pancreatitis. She is wondering if she can get a referral to see a nutritionist. She sees the NP in mid August and then the MD in mid September. She was vomiting last week. She was vomiting the day before the sinus infection. She is done with the medication tomorrow.     She would like to know what her labs look like today. She is interested in knowing what her kidneys are doing today, she remains concerned about her history of kidney issues. She is also worried that her pancreas may be worse. She did almost go to the hospital last week due to the pain. She is only on morphine 30 mg daily now. She does continue to work with the pain clinic as well.   Chief Complaint   Patient presents with     Follow-up     6 week follow up          Patient Active Problem List   Diagnosis     Nausea & vomiting     Chronic kidney disease (CKD) stage G3a/A1, moderately decreased glomerular filtration rate (GFR) between 45-59 mL/min/1.73 square meter and albuminuria creatinine ratio less than 30 mg/g     Focal glomerular sclerosis     Anxiety and depression     RSD lower limb, bilateral     ADD (attention  deficit disorder)     RSD upper limb, right     Other specified hypothyroidism     Anxiety     Osteopenia     Major depression     Hypertension     Insomnia     Mixed hyperlipidemia     Right rotator cuff tear     Cluster headaches     Tachycardia     Hypoxemia     Lumbar radiculopathy     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     Stenosis of lateral recess of lumbar spine     Reflex sympathetic dystrophy     Acute encephalopathy     Temporomandibular joint-pain-dysfunction syndrome (TMJ)     Pancreatitis     Localized swelling of lower leg     Medical cannabis use     Acute right-sided low back pain without sciatica     Acute exacerbation of chronic low back pain     Acute pancreatitis     Accelerated hypertension     Hypothyroidism, unspecified type     Chronic pain syndrome     Non morbid obesity due to excess calories     Snoring       Current Outpatient Prescriptions   Medication Sig Dispense Refill     aspirin 81 MG EC tablet Take 81 mg by mouth daily.       atorvastatin (LIPITOR) 40 MG tablet Take 1 tablet (40 mg total) by mouth at bedtime. 90 tablet 3     azelastine (ASTEPRO) 0.15 % (205.5 mcg) Spry nasal spray 1 spray by Left Nare route 2 (two) times a day as needed.       diazePAM (VALIUM) 5 MG tablet Take 1 tab 30 minutes before procedure appointment. Take 2nd tab after signing consent form as needed 6 tablet 1     diphenhydrAMINE (BENADRYL) 25 mg capsule Take 25 mg by mouth every 6 (six) hours as needed.        furosemide (LASIX) 40 MG tablet Take 0.5-1 tablets (20-40 mg total) by mouth daily as needed. 90 tablet 1     gabapentin (NEURONTIN) 600 MG tablet Take 600 mg by mouth 3 (three) times a day.       levothyroxine (SYNTHROID, LEVOTHROID) 50 MCG tablet Take 1 tablet (50 mcg total) by mouth daily. 90 tablet 1     losartan (COZAAR) 25 MG tablet Take 1 tablet (25 mg total) by mouth daily. 90 tablet 1     morphine (MSIR) 30 MG tablet Take 1 tablet (30 mg total) by mouth every 6 (six) hours as  needed for pain. 84 tablet 0     SUMAtriptan (IMITREX) 50 MG tablet Take 1 tablet (50 mg total) by mouth every 2 (two) hours as needed for migraine. 27 tablet 0     traZODone (DESYREL) 100 MG tablet Take 2-4 tablets (200-400 mg total) by mouth at bedtime. 360 tablet 1     verapamil (CALAN-SR) 240 MG CR tablet Take 1 tablet (240 mg total) by mouth bedtime. 90 tablet 1     ondansetron (ZOFRAN-ODT) 4 MG disintegrating tablet Take 1 tablet (4 mg total) by mouth every 8 (eight) hours as needed for nausea. 15 tablet 3     No current facility-administered medications for this visit.        History   Smoking Status     Former Smoker     Packs/day: 1.00     Years: 20.00     Quit date: 1/1/2000   Smokeless Tobacco     Never Used           OBJECTIVE:        Recent Results (from the past 240 hour(s))   Comprehensive Metabolic Panel   Result Value Ref Range    Sodium 136 136 - 145 mmol/L    Potassium 4.0 3.5 - 5.0 mmol/L    Chloride 102 98 - 107 mmol/L    CO2 21 (L) 22 - 31 mmol/L    Anion Gap, Calculation 13 5 - 18 mmol/L    Glucose 79 70 - 125 mg/dL    BUN 11 8 - 28 mg/dL    Creatinine 0.76 0.60 - 1.10 mg/dL    GFR MDRD Af Amer >60 >60 mL/min/1.73m2    GFR MDRD Non Af Amer >60 >60 mL/min/1.73m2    Bilirubin, Total 0.9 0.0 - 1.0 mg/dL    Calcium 9.2 8.5 - 10.5 mg/dL    Protein, Total 6.3 6.0 - 8.0 g/dL    Albumin 3.7 3.5 - 5.0 g/dL    Alkaline Phosphatase 70 45 - 120 U/L    AST 19 0 - 40 U/L    ALT 11 0 - 45 U/L   Lipase   Result Value Ref Range    Lipase 148 (H) 0 - 52 U/L   Amylase   Result Value Ref Range    Amylase 87 5 - 120 U/L   Lipid Cascade   Result Value Ref Range    Cholesterol 203 (H) <=199 mg/dL    Triglycerides 97 <=149 mg/dL    HDL Cholesterol 69 >=50 mg/dL    LDL Calculated 115 <=129 mg/dL    Patient Fasting > 8hrs? Unknown    Glycosylated Hemoglobin A1c   Result Value Ref Range    Hemoglobin A1c 5.2 3.5 - 6.0 %   1 (CBC with Diff)   Result Value Ref Range    WBC 6.3 4.0 - 11.0 thou/uL    RBC 3.99 3.80 - 5.40  mill/uL    Hemoglobin 12.5 12.0 - 16.0 g/dL    Hematocrit 37.4 35.0 - 47.0 %    MCV 94 80 - 100 fL    MCH 31.2 27.0 - 34.0 pg    MCHC 33.3 32.0 - 36.0 g/dL    RDW 12.5 11.0 - 14.5 %    Platelets 162 140 - 440 thou/uL    MPV 7.7 7.0 - 10.0 fL    Neutrophils % 57 50 - 70 %    Lymphocytes % 35 20 - 40 %    Monocytes % 7 2 - 10 %    Eosinophils % 2 0 - 6 %    Basophils % 0 0 - 2 %    Neutrophils Absolute 3.6 2.0 - 7.7 thou/uL    Lymphocytes Absolute 2.2 0.8 - 4.4 thou/uL    Monocytes Absolute 0.4 0.0 - 0.9 thou/uL    Eosinophils Absolute 0.1 0.0 - 0.4 thou/uL    Basophils Absolute 0.0 0.0 - 0.2 thou/uL       Vitals:    07/12/17 1324   BP: 126/82   Pulse: 76   Weight: 164 lb (74.4 kg)     Weight: 164 lb (74.4 kg)        Physical Exam:  GENERAL APPEARANCE: A&A, NAD, well hydrated, well nourished  SKIN:  Normal skin turgor, no lesions/rashes   HEENT: moist mucous membranes, no rhinorrhea  NECK: Normal  CV: RRR, no M/G/R   LUNGS: CTAB  ABDOMEN: Soft, minimally tender in the epigastrium, normal bowel sounds  EXTREMITY: no edema   NEURO: no gross deficits  PSYCH: Affect is mildly flat, she makes normal eye contact, she is not tearful

## 2021-06-11 NOTE — TELEPHONE ENCOUNTER
Therapist attempted to contact patient again after learning that she was attempting to get in touch after her missed appointment yesterday. Patient did not answer so therapist left another voice message requesting that she call the scheduling line to set up another appointment.

## 2021-06-11 NOTE — TELEPHONE ENCOUNTER
Anticoagulation Management    Unable to reach Sandy today.    Today's INR result of 2.0 is Therapeutic (goal INR of 2.0-3.0).     Follow up required to confirm warfarin doses taken.    Left message to continue current dose of warfarin 7.5 mg tonight.       ACN to follow up    Angie Rice RN

## 2021-06-11 NOTE — TELEPHONE ENCOUNTER
Pharmacy calls, the directions for use of the new fentanyl patch orders do not coincide with how this is currently ordered as every other day.  Will adjust directions and re-cue for covering provider.  Requested Prescriptions     Pending Prescriptions Disp Refills     fentaNYL (DURAGESIC) 25 mcg/hr 5 patch 0     Sig: Place 1 patch on the skin every other day for 10 days.     fentaNYL (DURAGESIC) 50 mcg/hr 5 patch 0     Sig: Place 1 patch on the skin every other day for 10 days.     Signed Prescriptions Disp Refills     fentaNYL (DURAGESIC) 50 mcg/hr 9 patch 0     Sig: Place 1 patch on the skin every third day.     Authorizing Provider: DANNA SEVERINO     fentaNYL (DURAGESIC) 25 mcg/hr 9 patch 0     Sig: Place 1 patch on the skin every third day.     Authorizing Provider: DANNA SEVERINO

## 2021-06-11 NOTE — PROGRESS NOTES
Attempt 1: Care Guide called patient.  If this patient is returning my call, please transfer to Mylene Owen at ext [phone].
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
## 2021-06-11 NOTE — TELEPHONE ENCOUNTER
ANTICOAGULATION  MANAGEMENT    Assessment     Today's INR result of 3.8 is Supratherapeutic (goal INR of 2.0-3.0)        Warfarin taken as previously instructed    No new diet changes affecting INR    No new medication/supplements affecting INR    Continues to tolerate warfarin with no reported s/s of bleeding or thromboembolism     Previous INR was Subtherapeutic    Plan:     Spoke on phone with Sandy regarding INR result and instructed:     Warfarin Dosing Instructions:  Continue current warfarin dose 5 mg daily on Thursdays; and 7.5 mg daily rest of week  (9.1 % change)    Instructed patient to follow up no later than: 2 weeks     Education provided: importance of therapeutic range, importance of following up for INR monitoring at instructed interval, importance of taking warfarin as instructed, monitoring for bleeding signs and symptoms and when to seek medical attention/emergency care    Sandy verbalizes understanding and agrees to warfarin dosing plan.    Instructed to call the [facility] for any changes, questions or concerns. (#[phone])   ?   Angie Rice RN    Subjective/Objective:      Sandy ZIMMERMAN Tj, a 79 y.o. female is on warfarin.     Sandy reports:     Home warfarin dose: verbally confirmed home dose with Sandy and updated on anticoagulation calendar     Missed doses: No     Medication changes:  No     S/S of bleeding or thromboembolism:  No     New Injury or illness:  No     Changes in diet or alcohol consumption:  No     Upcoming surgery, procedure or cardioversion:  No    Anticoagulation Episode Summary     Current INR goal:   2.0-3.0   TTR:   26.0 % (1 y)   Next INR check:   9/16/2020   INR from last check:   3.80! (9/2/2020)   Weekly max warfarin dose:      Target end date:      INR check location:      Preferred lab:      Send INR reminders to:   [facility]    Indications    Other chronic pulmonary embolism without acute cor pulmonale (H) [I27.82]           Comments:             Anticoagulation Care Providers     Provider Role Specialty Phone number    Caitlyn Rees MD Referring Family Medicine [phone]

## 2021-06-11 NOTE — TELEPHONE ENCOUNTER
[facility] pharmacy calls, apparently there are manufacturing issues with the fentanyl 75 mcg patch, this is across all pharmacies and potentiall until mid October.    Pharmacist is asking if patient can be prescribed both a 50 mcg and 25 mcg patch in the meantime as they do have this in stock.

## 2021-06-11 NOTE — TELEPHONE ENCOUNTER
The patient is having issues with providing the patient with fentanyl patches 75 mcg, at this time will provide a 50 mcg patch + 25 mcg patch as an alternative.     Purvi Hawkins, CNP

## 2021-06-12 NOTE — PATIENT INSTRUCTIONS - HE
PLAN:   Trial of zonisamide to see if can help with headaches as the Topamax is good without affecting kidney function.  25 mg daily for 1 week then twice a day, call after 1 week.    Lamotrigine increased to help with chronic regional pain syndrome symptoms with your feet, 100 mg capsules 2 in the morning 1-1/2 at bedtime for 1 week then 2 twice a day.    Continue with the fentanyl 50 mcg and 25 mcg every other day for the next month until the 75 mcg are available.    Dr. Lynn to see if you can return to your previous psychotherapist.    Follow-up with Dr. Lynn in 8 weeks.

## 2021-06-12 NOTE — PROGRESS NOTES
ASSESSMENT/PLAN:       1. Anxiety and depression  -The patient struggles quite a bit with anxiety and depression and is actually fairly tearful today.  She has been on Cymbalta in the past and did not tolerate that.  She additionally thinks she has been on other SSRIs without good results.  I am going to have her start a low-dose of buspirone for now, and referred her to the pain clinic to discuss and meet with a mental health provider there as she is an established pain clinic patient and her previous therapist has left her clinic.  She should follow-up here in approximately 4 weeks for recheck and medication to see how things are going sooner if things are worsening.  - Ambulatory referral to Pain Clinic  - busPIRone (BUSPAR) 5 MG tablet; Take 1 tablet (5 mg total) by mouth 2 (two) times a day.  Dispense: 60 tablet; Refill: 1    2. Nausea & vomiting  -Patient continues to be quite nauseated and she is wondering if this is secondary to her pancreatitis or if there are other issues contributing.  She is not on any sort of acid reducing medication at this time so I am going to have her start omeprazole for now.  She meets with GI in the next few weeks and she will further discuss this with them.  - omeprazole (PRILOSEC) 20 MG capsule; Take 1 capsule (20 mg total) by mouth daily.  Dispense: 30 capsule; Refill: 5    3. Hypertension  -Blood pressure continues to be fairly erratic and she is requesting an increase in her losartan.  Right now it is normal and this makes me because of a little.  She gets numbers as high as 1 [id number] however.  Given this I am going to have her try doubling her losartan to 25 mg orally 2 times daily rather than once daily, again following up in approximately 1 month for recheck.  Watch blood pressures at home and call if she is symptomatic or if they are seeming to go low  - losartan (COZAAR) 25 MG tablet; Take 1 tablet (25 mg total) by mouth 2 (two) times a day.  Dispense: 180 tablet; Refill:  1    4. Breast pain, left  -She has had some left breast fullness now for a few months.  Exam previously has been normal.  Referring for diagnostic mammogram as it has been 1 year since she has had a mammogram.  - Mammo Diagnostic Bilateral; Future  - US Breast Limited (Focal) Bilateral; Future        Caitlyn Rees MD      PROGRESS NOTE   8/22/2017    SUBJECTIVE:  Sandy Spencer is a 76 y.o. female  who presents for follow up.     Patient continues to be quite anxious and she feels fairly down as well.  She has started injections in her knees and is doing PT. the patient was initially going to [facility] for care but was told by them that they would not do an operation on her knees.  Since then she has started going to the [place] [facility] and they have been doing injections.  He does not feel she has bone-on-bone arthritis.  Additionally she is doing some physical therapy and is having taping done which is helping quite a bit with her pain.  Her left knee is worse, but the right knee hurts worse becase of the compensation. She has a brace coming as well. She gets nauseated from the pain.     She is on a cpap now, she hates it. Her pulse is now back in the 60s. She has a hard time getting used to the CPAP machine. She thinks that she is going to have get the full mask. She pulls off the mask while she is sleeping. She had a chin strap and that doesn't help. It is hot to wear it. She doesn't feel more rested yet. The machine only recorded 6 minutes of usage.  She feels like she has to sit up in bed to wear the cpap.She isn't scared to wear it, but is uncomfortable wearing it an laying down. She is just tired. She just wants to start crying. She is so depressed as well. She doesn't have a counselor anymore. She was seeing a Italian person that she was going to, she quit working. She does have a counselor at the [facility] but has had to cancel some appointments. She does have an appointment  with Felicia hamilton and Dr. Lynn in September.     She is wondering about having her losartan increased.  She finds that her blood pressure continues to be fairly elevated at times, it does come down with morphine.  This morning it was 170/70, but she took a morphine just prior to coming here which is why she feels it is normal.  She has had no issues with the losartan since adding it.      She went to the GI doctor, saw the PA. She does go in for the nutritionist on 8/28. She is nauseated all the time. She is not sure what thiat is from.     Nagging pain in her left breast, has been there for some time..     She has been going to [facility], a pt that specializes in RSD.   Chief Complaint   Patient presents with     Medication Management     Patient is here today to review her current medications.      Follow-up     Patient would like to follow up from the 7/20 office visit. She has some concerns regarding her c-pap machine.          Patient Active Problem List   Diagnosis     Nausea & vomiting     Chronic kidney disease (CKD) stage G3a/A1, moderately decreased glomerular filtration rate (GFR) between 45-59 mL/min/1.73 square meter and albuminuria creatinine ratio less than 30 mg/g     Focal glomerular sclerosis     Anxiety and depression     RSD lower limb, bilateral     ADD (attention deficit disorder)     RSD upper limb, right     Other specified hypothyroidism     Anxiety     Osteopenia     Major depression     Hypertension     Insomnia     Mixed hyperlipidemia     Right rotator cuff tear     Cluster headaches     Tachycardia     Hypoxemia     Lumbar radiculopathy     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     Stenosis of lateral recess of lumbar spine     Reflex sympathetic dystrophy     Acute encephalopathy     Temporomandibular joint-pain-dysfunction syndrome (TMJ)     Pancreatitis     Localized swelling of lower leg     Medical cannabis use     Acute right-sided low back pain without sciatica      Acute exacerbation of chronic low back pain     Acute pancreatitis     Accelerated hypertension     Hypothyroidism, unspecified type     Chronic pain syndrome     Non morbid obesity due to excess calories     Snoring       Current Outpatient Prescriptions   Medication Sig Dispense Refill     aspirin 81 MG EC tablet Take 81 mg by mouth daily.       atorvastatin (LIPITOR) 40 MG tablet Take 1 tablet (40 mg total) by mouth at bedtime. 90 tablet 3     azelastine (ASTEPRO) 0.15 % (205.5 mcg) Spry nasal spray 1 spray by Left Nare route 2 (two) times a day as needed.       diazePAM (VALIUM) 5 MG tablet Take 1 tab 30 minutes before procedure appointment. Take 2nd tab after signing consent form as needed 6 tablet 1     diphenhydrAMINE (BENADRYL) 25 mg capsule Take 25 mg by mouth every 6 (six) hours as needed.        furosemide (LASIX) 40 MG tablet Take 0.5-1 tablets (20-40 mg total) by mouth daily as needed. 90 tablet 1     gabapentin (NEURONTIN) 600 MG tablet Take 600 mg by mouth 3 (three) times a day.       levothyroxine (SYNTHROID, LEVOTHROID) 50 MCG tablet Take 1 tablet (50 mcg total) by mouth daily. 90 tablet 1     LORazepam (ATIVAN) 1 MG tablet Take 1 mg by mouth.       losartan (COZAAR) 25 MG tablet Take 1 tablet (25 mg total) by mouth 2 (two) times a day. 180 tablet 1     ondansetron (ZOFRAN-ODT) 4 MG disintegrating tablet Take 1 tablet (4 mg total) by mouth every 8 (eight) hours as needed for nausea. 15 tablet 3     SUMAtriptan (IMITREX) 50 MG tablet Take 1 tablet (50 mg total) by mouth every 2 (two) hours as needed for migraine. 27 tablet 0     traZODone (DESYREL) 100 MG tablet Take 2-4 tablets (200-400 mg total) by mouth at bedtime. 360 tablet 1     verapamil (CALAN-SR) 240 MG CR tablet Take 1 tablet (240 mg total) by mouth bedtime. 90 tablet 1     busPIRone (BUSPAR) 5 MG tablet Take 1 tablet (5 mg total) by mouth 2 (two) times a day. 60 tablet 1     omeprazole (PRILOSEC) 20 MG capsule Take 1 capsule (20 mg total)  by mouth daily. 30 capsule 5     No current facility-administered medications for this visit.        History   Smoking Status     Former Smoker     Packs/day: 1.00     Years: 20.00     Quit date: 1/1/2000   Smokeless Tobacco     Never Used           OBJECTIVE:        No results found for this or any previous visit (from the past 240 hour(s)).    Vitals:    08/22/17 1419   BP: 120/64   Patient Site: Right Arm   Patient Position: Sitting   Cuff Size: Adult Regular   Pulse: 70   SpO2: 95%   Weight: 161 lb 4.8 oz (73.2 kg)     Weight: 161 lb 4.8 oz (73.2 kg)        Physical Exam:  GENERAL APPEARANCE: A&A, NAD, well hydrated, well nourished  SKIN:  Normal skin turgor, no lesions/rashes   HEENT: moist mucous membranes, no rhinorrhea  NECK: Normal  CV: RRR, no M/G/R   LUNGS: CTAB  ABDOMEN: S&NT, no masses   EXTREMITY: no edema, PT tape on bilateral knees  NEURO: no gross deficits   Psych: Her affect is flat, she is tearful today, her thought process and speech pattern are normal, no obvious hallucinations

## 2021-06-12 NOTE — PROGRESS NOTES
"Nutrition Assessment    Patient seen for out patient MNT inital assessment.    Referring diagnosis: Other Acute pancreatitis, chronic kidney disease     Subjective:  Sandy comes in today d/t chronic pancreatitis and notes that she also has kidney disease stage 3.  She has had her gall bladder removed and notes that she has shortened bowel with her ileum attached to her sigmoid colon.  She deals with nausea and chronic pain.  She would like to lose some wt if possible.  Exercise is limited with pain issues.  Supplements she takes:  Vit D, MVI, fish oil.  Meal recall:  Breakfast is cheerios with fruit and almond milk.  Other meals she notes that she eats veggies, fruit and small amounts of meat.      Height: 63.5\"  Most recent weight: 160# last clinic visit.    Nutrition intervention that addressed medical nutrition therapy for above diagnosis: Meal plan for chronic pancreatitis.    RD reviewed:  Pancreatic enzymes to help with digestion/absorption.    Assessment of diet/weight history: Difficulty losing wt d/t health issues.  Feels that she does not eat much yet cannot lose wt.    Assessment of physical activity: Limited.    Goals: 1.  Low fat diet for chronic pancreatitis.  She is doing this.  2.  Consider pancreatic/digestive enzymes to help with digestion/absorption.  3.  Consider probiotic.  Patient asking for specific one- consider VSL#3 which is shown to benefit irritable bowel.    MD consider prescribing digestive enzymes and VSL #3 for patient's digestive issues.    Patient had no barriers to learning, verbalized understanding, and compliance is expected.    Phone number provided for questions. Recommended follow-up in  as needed.    Thank you for this consult. Call me at [phone] for questions.    "

## 2021-06-12 NOTE — PROGRESS NOTES
"           Medication Therapy Management - [facility]       ASSESSMENT AND PLAN    Chronic Pain Syndrome: Uncontrolled.   Symptoms today most consistent with neuropathic pain picture, however, she has had difficulty with tolerability of many medications. Avoiding TCAs due to age. Discussed that MSIR could possibly be contributing to the nausea and discomfort that she is describing. It would be reasonable to re-try a patch formulation for pain control. We discussed fentanyl and butrans. Current MME is too high for Butrans use right now.   -Consider opioid rotation back to Fentanyl. Alternatively, could consider liquid opioid formulations if she is worried about absorption. Butrans may be appropriate in the future if she were on a lower MME.   -Encouraged to try the ketamine as she has not yet used. Encouraged to continue buspar.     Supplementation  Counseled on supplements. No true FDA approval for most. Muscle pain symptoms not consistent with statin myopathy, therefore, would not expect significant benefit with Coq10. Probiotics may improve gut health and aid with constipation, provided they are deemed to be safe by her GI provider. Fish oil may provide antioxidant effects with limited data for pain. Discussed that supplements also may not be absorbed perfectly and she should discuss with GI to be safe    FOLLOW-UP PLAN  Sandy was advised to follow up in 6-8 weeks.        SUBJECTIVE AND OBJECTIVE  Sandy Spencer is a 76 y.o. female who was referred by Felicia Damon CNP for MTM services.  Sandy's chief concern today is medication management and medication questions.  Pertinent commodities: CKD and bowel resections resulting in impaired medication absorption, depression, ALIYAH.     She follows with [facility]. Describes hx of \"undigested medication pouches\" in her intestines in the past. She is very concerned about taking too many medications which may affect this. Describes significant nausea \"possibly " "related to GI but I think it could by my morphine too or even worse pain\". She hasn't taken her zofran because she was worried about drug interactions. No longer using digestive enzymes from GI because she can't afford them.     Chronic pain secondary to lumbar radiculopathy, lumbar stenosis, HA, RSD. Bilateral laminectomy in August that was not successful. RSD is \"flaring up\" today. Rooster comb injections in the knees which are somewhat assistive. She sees physical therapy. Has been taking MSIR 30 mg (on average 5 per day), which she \"doesn't like\". Feels \"sick\" from them and requests an alternative. \"they only last for two hours\". She would be most interested in patch formulations. Describes success from fentanyl in the past, despite notes discussing difficulty with adhering to the skin. Hasn't received the ketamine yet (new per Dr. Lynn). On verapamil.     Trazodone 400 mg \"not assistive\" for sleep despite taking for the the last 20 years. Sleeps 4 hrs on average. Pain and anxiety keeping awake. CPAP causes anxiety. She is not interested in changing trazodone. Took first dose of buspar last night.     Multiple questions about supplements: CoQ10 for \"muscle pain\". She is unsure if this has been helping but just bought a new bottle, Probiotic, and multivitamin       Pain Diagnosis: lumbar radiculopathy/stenosis, HA, RSD    Location/Description: sciatic nerve pain and burning behind right knee. Crushing, pins and needles. Big toes numb    Pain Score: 6    Functional Score: 8    Previously tried pain medications and reactions:    NSAIDS: Not assessed    Opioids: Fentanyl - helped pain. Better than pain, Hydrocodone/APAP - didn't work, hydromorphone - helped for short, Butrans - never tried. oxycodone - constipation, tapentadol - not covered by insurance    Antidepressants: Cymbalta - throat closes, amitriptyline, nortriptyline, doxepin- tried them, but can't remember    Gabapentinoids: Gabapentin - didn't help, " Lyrica - throat closes    Muscle Relaxants Baclofen - helpful, methocarbamol - helpful. Scared     Topicals: Capsaicin - not helpful, lidoderm patches - not helpful    Amantadine: Unknown    Non-pharmacological treatments:     Physical Therapy: currently undergoing    Acupuncture: receives for cluster headaches    Universal Precautions:   UDS/Swab- 06/06/2016   Consent-   Agreement- 04/14/2017  Pharmacy- as documented   - 08/28/2017 Reviewed, ok  Count- n/a  Psychological evaluation n/a  Pharmacogenetic testing- n/a  MME- 150      Social History     Social History     Marital status:      Spouse name: N/A     Number of children: N/A     Years of education: N/A     Occupational History     Not on file.     Social History Main Topics     Smoking status: Former Smoker     Packs/day: 1.00     Years: 20.00     Quit date: 1/1/2000     Smokeless tobacco: Never Used     Alcohol use No     Drug use: No     Sexual activity: No     Other Topics Concern     Not on file     Social History Narrative       Current Outpatient Prescriptions   Medication Sig Dispense Refill     atorvastatin (LIPITOR) 40 MG tablet Take 1 tablet (40 mg total) by mouth at bedtime. 90 tablet 3     azelastine (ASTEPRO) 0.15 % (205.5 mcg) Spry nasal spray 1 spray by Left Nare route 2 (two) times a day as needed.       busPIRone (BUSPAR) 15 MG tablet Take 1 tablet (15 mg total) by mouth 2 (two) times a day. 60 tablet 3     cholecalciferol, vitamin D3, 5,000 unit Tab Take 1 tablet by mouth daily.       diphenhydrAMINE (BENADRYL) 25 mg capsule Take 25 mg by mouth every 6 (six) hours as needed.        furosemide (LASIX) 40 MG tablet Take 0.5-1 tablets (20-40 mg total) by mouth daily as needed. 90 tablet 1     gabapentin (NEURONTIN) 600 MG tablet Take 600 mg by mouth 3 (three) times a day.       Lactobacillus rhamnosus GG (CULTURELLE) 15 billion cell CpSP Take 1 capsule by mouth daily.       levothyroxine (SYNTHROID, LEVOTHROID) 50 MCG tablet Take 1  tablet (50 mcg total) by mouth daily. 90 tablet 1     losartan (COZAAR) 25 MG tablet Take 1 tablet (25 mg total) by mouth 2 (two) times a day. 180 tablet 1     morphine (MSIR) 30 MG tablet Take 1 tablet (30 mg total) by mouth every 6 (six) hours as needed for pain (Max 5 tabs a day). 140 tablet 0     OMEGA-3/DHA/EPA/FISH OIL (FISH OIL-OMEGA-3 FATTY ACIDS) 300-1,000 mg capsule Take 1.2 g by mouth 2 (two) times a day.       omeprazole (PRILOSEC) 20 MG capsule Take 1 capsule (20 mg total) by mouth daily. 30 capsule 5     ondansetron (ZOFRAN-ODT) 4 MG disintegrating tablet Take 1 tablet (4 mg total) by mouth every 8 (eight) hours as needed for nausea. 15 tablet 3     SUMAtriptan (IMITREX) 50 MG tablet Take 1 tablet (50 mg total) by mouth every 2 (two) hours as needed for migraine. 27 tablet 0     traZODone (DESYREL) 100 MG tablet Take 2-4 tablets (200-400 mg total) by mouth at bedtime. 360 tablet 1     verapamil (CALAN-SR) 240 MG CR tablet Take 1 tablet (240 mg total) by mouth bedtime. 90 tablet 1     aspirin 81 MG EC tablet Take 81 mg by mouth daily.       No current facility-administered medications for this encounter.        We reviewed Sandy's medication list with them, discussing reason for use, directions for use, and potential side effects of each medication.  Indication, safety, efficacy, and convenience was assessed for all medications.     Sandy was provided with a printed AVS, and this care plan was communicated via EMR with Felicia Damon CNP and is the authorizing prescriber for this visit.  Direct supervision was available by either the referring provider or another available physician when needed.    This note has been dictated using voice recognition software. Any grammatical or context distortions are unintentional and inherent to the software.       Time spent: 60 minutes    Arabella Quezada, PharmD  [facility] Pharmacist at [facility]

## 2021-06-12 NOTE — PROGRESS NOTES
"Assessment/Plan:     Problem List Items Addressed This Visit     Lumbar radiculopathy    Relevant Medications    fentaNYL (DURAGESIC) 50 mcg/hr    fentaNYL (DURAGESIC) 25 mcg/hr    Chronic pain syndrome    Relevant Medications    lamoTRIgine (LAMICTAL) 100 MG tablet    fentaNYL (DURAGESIC) 50 mcg/hr    fentaNYL (DURAGESIC) 25 mcg/hr              No follow-ups on file.    There are no Patient Instructions on file for this visit.      Subjective:       80 y.o. female The patient has been notified of the following:      \"We have found that certain health care needs can be provided without the need for a face to face visit.  This service lets us provide the care you need with a phone conversation.       I will have full access to your [facility] medical record during this entire phone call.   I will be taking notes for your medical record.      Since this is like an office visit, we will bill your insurance company for this service.       There are potential benefits and risks of telephone visits (e.g. limits to patient confidentiality) that differ from in-person visits.?  Confidentiality still applies for telephone services, and nobody will record the visit.  It is important to be in a quiet, private space that is free of distractions (including cell phone or other devices) during the visit.??      If during the course of the call I believe a telephone visit is not appropriate, you will not be charged for this service\"     Consent has been obtained for this service by care team member: Yes    MD reviews her RSD symptoms have changed with the weather.  She feels \"scalded\" on top of her toes, left greater than right.  Because of [place] weather without the weather changes was less of an issue since moving here.    She is taking the lamotrigine 200 mg in the morning 100 mg at bedtime.  She is not sleeping well despite trazodone 40 mg at night.  She wonders about increasing the lamotrigine.    She has been working in " physical therapy, they have been doing Kinesiotape which seems to be helpful.    She did miss some appointments with psychotherapist.  She is ambivalent, given the difficulties in communication.  Her son's  was last week and her primary care provider had set this arrangement up.    She is concerned her GFR keeps decreasing.  She is not aware of any medications that could be related to this.  She did stop the Topamax which she has taken on long-term for migraine headaches.  She is wondering if they are returning more frequent.    She did have an injection  in her knees.  She wonders when those can be repeated.    She has been taking the Duragesic and a 50 and 25 mcg film every other day as the 75 mcg films were not backorder from the  that she has followed most comfortable.      Current Outpatient Medications:      calcium carb/vitamin D3/vit K1 (VIACTIV ORAL), Take 1 tablet by mouth daily., Disp: , Rfl:      evolocumab 140 mg/mL PnIj, Inject 140 mg under the skin every 14 (fourteen) days., Disp: 6 mL, Rfl: 3     fentaNYL (DURAGESIC) 25 mcg/hr, Place 1 patch on the skin every other day for 10 days., Disp: 15 patch, Rfl: 0     fentaNYL (DURAGESIC) 50 mcg/hr, Place 1 patch on the skin every other day for 10 days., Disp: 15 patch, Rfl: 0     lamoTRIgine (LAMICTAL) 100 MG tablet, Two morning, one and one-half bedtime for one week, then two twice a day, Disp: 120 tablet, Rfl: 2     levothyroxine (LEVO-T) 50 MCG tablet, Take 1 tablet (50 mcg total) by mouth Daily at 6:00 am., Disp: 90 tablet, Rfl: 3     naloxone (NARCAN) 4 mg/actuation nasal spray, 1 spray (4 mg dose) into one nostril for opioid reversal. Call 911. May repeat if no response in 3 minutes., Disp: 1 Box, Rfl: 0     potassium citrate (UROCIT-K) 10 mEq (1,080 mg) SR tablet, Take 2 tablets (20 mEq total) by mouth 2 (two) times a day., Disp: 360 tablet, Rfl: 1     rosuvastatin (CRESTOR) 40 MG tablet, Take 1 tablet (40 mg total) by mouth at  "bedtime., Disp: 90 tablet, Rfl: 3     sodium phosphates 133 mL (FLEET ENEMA) 19-7 gram/118 mL Enem rectal enema, Take as directed by the preparation instructions, Disp: , Rfl:      SUMAtriptan (IMITREX) 50 MG tablet, Take 1 tablet (50 mg total) by mouth every 2 (two) hours as needed for migraine., Disp: 9 tablet, Rfl: 5     traZODone (DESYREL) 100 MG tablet, TAKE 2 TO 4 TABLETS(200  MG) BY MOUTH AT BEDTIME AS NEEDED FOR SLEEP (Patient taking differently: Take 400 mg by mouth at bedtime. TAKE  4 TABLETS(400 MG) BY MOUTH AT BEDTIME AS NEEDED FOR SLEEP), Disp: 360 tablet, Rfl: 1     warfarin ANTICOAGULANT (COUMADIN) 2.5 MG tablet, Take 1 tablet (2.5 mg total) by mouth See Admin Instructions. Take 2.5 to 5 mg daily, adjusted for INR, Disp: 30 tablet, Rfl: 11     warfarin ANTICOAGULANT (COUMADIN/JANTOVEN) 5 MG tablet, Take one to two tablets (5 to 10 mg) by mouth daily. Adjust dose based on INR results as directed., Disp: 180 tablet, Rfl: 3     enoxaparin ANTICOAGULANT (LOVENOX) 60 mg/0.6 mL syringe, INJECT 0.6ML UNDER SKIN EVERY 12 HOURS, Disp: , Rfl:      methocarbamoL (ROBAXIN) 500 MG tablet, Take 1 tablet (500 mg total) by mouth 4 (four) times a day. (Patient taking differently: Take 500 mg by mouth 4 (four) times a day. Taking one tablet 3 to 4 times a week.), Disp: 60 tablet, Rfl: 1     valACYclovir (VALTREX) 1000 MG tablet, TAKE ONE TABLET BY MOUTH THREE TIMES A DAY FOR 7 DAYS as needed for outbreaks, Disp: 42 tablet, Rfl: 2    Current Facility-Administered Medications:      teriparatide injection 20 mcg (FORTEO), 20 mcg, Subcutaneous, DAILY, Caroline Sumner NP    Telephone alert, clear sensorium.  Thought processes logical.  No respiratory distress noted.               Objective:     Vitals:    10/06/20 1443   Height: 5' 2\" (1.575 m)   PainSc:   6   PainLoc: Back         Assessment: Pain generators include chronic regional pain syndrome lower extremities, migraine headache.    Plan: We will transition " Topamax to zonisamide, which may have less renal impact and still be helpful for her headaches.    We will increase lamotrigine gradually to 200 mg twice a day to help with RSD symptoms.    We will continue with the fentanyl, be told there is 1 month until the 75 mcg available.    We will clarify how frequently she can return for knee injection.    We will see if she can work with her previous psychotherapist at the pain clinic.    Total time more than 15 minutes.      This note has been dictated using voice recognition software. Any grammatical or context distortions are unintentional and inherent to the software

## 2021-06-12 NOTE — PROGRESS NOTES
S:  76 yof presenting with concerns of low pulse while she is sleeping, has this data from her fitbit.  States she was recently diagnosed with sleep apnea and told she needs a CPAP machine- states she tried calling the Sleep clinic back and no one has gotten back to her.  She is very concerned that she needs oxygen at night starting as soon as possible.  She is scheduled to see the Sleep doctor tomorrow to review her study and get her set up with the machine.  She is very concerned because her fitbit shows that she only has 30 minutes of restful sleep per night and last night her pulse rate went down to 42 while she was sleeping.  States she does not have palpitations or sensation of irregular heart beat, no chest pain.  During the day no issues with feeling light-headed or dizzy- has never fainted.  She does take trazodone at bedtime and does take morphine for knee pain.  Not on a beta blocker.    NM stress 2/2017 which was normal.    She says she feels overwhelmed by all her health problems and feels tearful all the time, feels exhausted and has no energy which she is frustrated by.    Patient Active Problem List   Diagnosis     Nausea & vomiting     Chronic kidney disease (CKD) stage G3a/A1, moderately decreased glomerular filtration rate (GFR) between 45-59 mL/min/1.73 square meter and albuminuria creatinine ratio less than 30 mg/g     Focal glomerular sclerosis     Anxiety and depression     RSD lower limb, bilateral     ADD (attention deficit disorder)     RSD upper limb, right     Other specified hypothyroidism     Anxiety     Osteopenia     Major depression     Hypertension     Insomnia     Mixed hyperlipidemia     Right rotator cuff tear     Cluster headaches     Tachycardia     Hypoxemia     Lumbar radiculopathy     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     Stenosis of lateral recess of lumbar spine     Reflex sympathetic dystrophy     Acute encephalopathy     Temporomandibular  joint-pain-dysfunction syndrome (TMJ)     Pancreatitis     Localized swelling of lower leg     Medical cannabis use     Acute right-sided low back pain without sciatica     Acute exacerbation of chronic low back pain     Acute pancreatitis     Accelerated hypertension     Hypothyroidism, unspecified type     Chronic pain syndrome     Non morbid obesity due to excess calories     Snoring       Current Outpatient Prescriptions on File Prior to Visit   Medication Sig Dispense Refill     aspirin 81 MG EC tablet Take 81 mg by mouth daily.       atorvastatin (LIPITOR) 40 MG tablet Take 1 tablet (40 mg total) by mouth at bedtime. 90 tablet 3     azelastine (ASTEPRO) 0.15 % (205.5 mcg) Spry nasal spray 1 spray by Left Nare route 2 (two) times a day as needed.       diazePAM (VALIUM) 5 MG tablet Take 1 tab 30 minutes before procedure appointment. Take 2nd tab after signing consent form as needed 6 tablet 1     diphenhydrAMINE (BENADRYL) 25 mg capsule Take 25 mg by mouth every 6 (six) hours as needed.        furosemide (LASIX) 40 MG tablet Take 0.5-1 tablets (20-40 mg total) by mouth daily as needed. 90 tablet 1     levothyroxine (SYNTHROID, LEVOTHROID) 50 MCG tablet Take 1 tablet (50 mcg total) by mouth daily. 90 tablet 1     losartan (COZAAR) 25 MG tablet Take 1 tablet (25 mg total) by mouth daily. 90 tablet 1     morphine (MSIR) 30 MG tablet Take 1 tablet (30 mg total) by mouth every 6 (six) hours as needed for pain. 84 tablet 0     ondansetron (ZOFRAN-ODT) 4 MG disintegrating tablet Take 1 tablet (4 mg total) by mouth every 8 (eight) hours as needed for nausea. 15 tablet 3     SUMAtriptan (IMITREX) 50 MG tablet Take 1 tablet (50 mg total) by mouth every 2 (two) hours as needed for migraine. 27 tablet 0     traZODone (DESYREL) 100 MG tablet Take 2-4 tablets (200-400 mg total) by mouth at bedtime. 360 tablet 1     verapamil (CALAN-SR) 240 MG CR tablet Take 1 tablet (240 mg total) by mouth bedtime. 90 tablet 1     gabapentin  (NEURONTIN) 600 MG tablet Take 600 mg by mouth 3 (three) times a day.       No current facility-administered medications on file prior to visit.              O:  Vitals:    07/20/17 1340   BP: 124/62   Pulse: 60   Resp: 14   Temp: 98.6  F (37  C)   SpO2: 97%     Gen alert NAD  Heart rrr, rate in the 60s, no murmurs gallops or rubs  Lungs clear no wheezing or crackles  Ext trace edema to mid shin bilaterally, no calf pain  Psyc anxious, tearful      A/P:  Sleep apnea- new diagnosis, concern for low pulse at night:  Discussed with patient at length that her fitbit is not the most reliable way to collect information about her sleep.  It seems to be causing her more anxiety with the information it provides so I encouraged her to consider not wearing it at night.  I discussed at length with the patient about sleep apnea and how it works, this is most certainly contributing to her daytime fatigue and emotional lability and likely worsening her stress level and anxiety.  I did encourage the patient to keep her appointment with the sleep doctor to learn more about her study and get set up with cpap machine to start treating her sleep apnea.  She is not having symptoms from her reported low pulse rate: no syncope or light-headedness, no sensation of irregular heart beat, no chest pain.  Looking back in her records, prior EKGs show sinus robert in the 50s-60s.  Discussed that untreated sleep apnea will cause the pulse to drop while sleeping, also she is on sedating medications that can contribute as well.    -Recommend seeing Sleep doctor as scheduled, sleep apnea needs to be treated  -Return for f/u with regular doctor as scheduled, sooner if concerns      Shwetha Bobo MD

## 2021-06-12 NOTE — TELEPHONE ENCOUNTER
Anticoagulation Management    Unable to reach Sandy today.    Today's INR result of 1.5 is Subtherapeutic (goal INR of 2.0-3.0).     Follow up required to discuss out of range INR .    Left message to Take one time booster tonight (10/2) of warfarin 7.5 mg, take warfarin 7.5 Saturday (10/3), and warfarin 5 mg Sunday (10/4)  this weekend.       ACN to follow up    Aye Rice RN

## 2021-06-12 NOTE — PROGRESS NOTES
"Review she is \"terrible\".  She is been focusing on her knees, having physical therapy and taping through the [facility] having had several sessions.  She is working with [facility] having had 4 of 5 injections with rooster comb.  She is concerned they are not helping her may be getting worse that will complete the process.  [place] and thought to be making progress, had some setbacks.    She does see Felicia Damon NP last session.  Due to increased pain diffusely the increase the morphine from 30 mg 4 times to 5 times a day.  She wonders if she is having more nausea.  She will is also taking pancreatic enzymes reviews her work with the [facility].  She notes each dose seems to help only 3 hours.  She may be having some memory problems.    She describes with all her pain she is concerned she may be \"losing it\".  She reviews with the trial of the oxycodone that was more expensive and more constipation.  She is having a lot of other costs and she cannot afford the medical cannabis.    She reviews it is her birthday next week and her family will not see her.  Her graph she had been seeing Dr. Gonsalez monthly for osteopathic manipulations.    She has discontinued the Celexa, has had decrease in sweating but still some present.  PCP started her on BuSpar 5 mg twice a day for anxiety.  She wonders if that could be increased  She is trying to work with her CPAP mask having difficulty finding one that works.    Blood pressure 92/50, pulse 71, resp. rate 16, height 5' 3.5\" (1.613 m), weight 160 lb (72.6 kg), not currently breastfeeding.    Ports pain score is 9.  She is alert clear sensorium appropriately groomed thought process tight logical.  She has a kinesiology tape on both knees.  Right knee does appear somewhat swollen, is not erythematous.  There is no color changes or sweating with her lower extremities noting there is been history of complex regional pain syndrome.  " Affect becomes tearful as soon as she describes her family.    Impression chronic pain has included history of lower extremity chronic regional pain syndrome, headaches, more recently abdominal pain with chronic pancreatitis actively working "[employer]".  Also osteoarthritis of the knees.    Plan we discussed with dissatisfaction on the morphine and other opioids use of ketamine.  Reviewed this would be relatively less expensive than the medical cannabis, discussed side effects.  She would like to proceed with this and will use 10 mg troches 3 times a day, after 1 week will call me increased to 4 times a day.  Goal to decrease the morphine.    We will increase the BuSpar gradually to 15 mg twice a day to help with anxiety.    Reviewed the cost for medical cannabis has come down and she may discuss this again with the medical dispensary's.    She is scheduled to see with Vanessa Nguyen in our office for psychotherapeutic support as her more recent therapist left the clinic a few months ago.    She will continue working with physical therapy and injections for her knee.    Time spent 25 minutes face-to-face more than 50% counts about above condition and coordination of treatment plan
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
## 2021-06-12 NOTE — PROGRESS NOTES
"Sandy Spencer is a 80 y.o. female who is being evaluated via a billable telephone visit.      The patient has been notified of following:     \"This telephone visit will be conducted via a call between you and your physician/provider. We have found that certain health care needs can be provided without the need for a physical exam.  This service lets us provide the care you need with a short phone conversation.  If a prescription is necessary we can send it directly to your pharmacy.  If lab work is needed we can place an order for that and you can then stop by our lab to have the test done at a later time.    Telephone visits are billed at different rates depending on your insurance coverage. During this emergency period, for some insurers they may be billed the same as an in-person visit.  Please reach out to your insurance provider with any questions.    If during the course of the call the physician/provider feels a telephone visit is not appropriate, you will not be charged for this service.\"    Patient has given verbal consent to a Telephone visit? Yes    What phone number would you like to be contacted at? [phone]    Patient would like to receive their AVS by AVS Preference: Mail a copy.    Patient is here for a follow up appointment with Dr. Lynn. Patient has low back, RSD and left leg pain, describes the pain as constant, pin pricking, \"real hot\", intermittent and deep ache,rates the pain as 6/10. Patient needs refills for lamotrigine.  CSA and UDT are deferred due to COVID 19. Functionality is 6. Patient states their pain interferes with sleep, walking, and work.    Perla Maya LPN    "

## 2021-06-12 NOTE — TELEPHONE ENCOUNTER
Medication being requested: Fenatanyl  Last visit date: 10/6/20.  Provider: JUANA  Next visit date: 12/3/20.  Provider: JUANA  Expected follow up: 8 weeks  MTM visit ([facility]) date: No  UDT date: 2/2020  Agreement date: 2/2020   (Last fill date; name; strength; provider; MME; quantity):  10/08/2020 Fentanyl 25 Mcg/hr Patch Qty: 15 for 30 days  10/08/2020 Fentanyl 50 Mcg/hr Patch Qty: 15 for 30 days  Pertinent between visit information about requested medication (telephone, mychart, prior authorization, concerns, comments): No  Script being sent to provider by nurse- dates and quantity:   Requested Prescriptions     Pending Prescriptions Disp Refills     fentaNYL (DURAGESIC) 50 mcg/hr [Pharmacy Med Name: FENTANYL 50MCG/HR PT72] 15 patch 0     Sig: Place 1 patch on the skin every other day.     fentaNYL (DURAGESIC) 25 mcg/hr [Pharmacy Med Name: FENTANYL 25MCG/HR PT72] 15 patch 0     Sig: Place 1 patch on the skin every other day.     Pharmacy cued: [facility]  Standing orders for withdrawal protocol implemented: NA

## 2021-06-12 NOTE — TELEPHONE ENCOUNTER
Patient on lab schedule 10/7/20 at 10:00am for fasting labs and INR. No orders for fasting lab work in chart

## 2021-06-12 NOTE — PROGRESS NOTES
Subjective:   Sandy Spencer is a 76 y.o. female who presents for evaluation of pain. Patient was last seen 06/27/2017.      Major issues:  1. Chronic pain syndrome    2. Reflex sympathetic dystrophy    3. Lumbar radiculopathy        CC: Pain  See rooming evaluation    HPI:   Impact of pain treatments:   Analgesia: fair, morphine taking the edge off but pain is still there.   ADL's: Work:she is retired. Household: not able to participate in chores. Social: not able to socialize in a fashionable manner.   AE's: denies   Aberrant behavior: denies     Headache:  Frequency of HA: daily  Duration of HA: days  Quality of HA: severe   Location of HA: occipital area  Severity of HA: moderate to severe   Stimulus for a HA: pain  Aura description: heavyness  #of HA days per mo: (15/mo to consider Botox)  Rehabilitation: none  Interventional: none  Prophylactic treatment: Imitrex  Abortive Medication for HA: Sumatriptan  Management: strategies pain medications     Aggravating factors: lifting, reaching above the head  Alleviating factors: medications, hot baths  Associated symptoms: increased pain  DME: none   Location/Laterality of the pain: back pain, neck pain  Timing: constant  Quality: cold, nagging (right thigh),   Severity: 7/10 last OV 6/10    Activities Impaired by Increasing Pain Severity: F= 7  3-Enjoy  4-Work, Enjoy  5-Active, Mood Work Enjoy  6-Sleep, Active, Mood Work Enjoy  7-Walk, Sleep, Active, Mood Work Enjoy  8-Relate, Walk, Sleep, Active, Mood Work Enjoy      Medication: Patient is taking Morphine 30 mg 4 times a day, Sumatriptan 50 mg tablet every 2 hours as needed for pain,  gabapentin 300 mg 3 times a day, trazodone 100 mg take 2-4 tablets at night.    Last opioid dose was Morphine last dose- 7/21/17 @ 900am.       Interventional: She is injections with Dr. Mittal.    Rehabilitation: She follows up with Yogi Guo at [facility] for RSD pain    Integrative Approaches: Stays active    Mental  "Health: She has a psychiatrist that she follows up with as needed      Records: Reviewed to prepare for today's visits and reflected throughout the note.    Additional Problems: increased pain    Diagnostics:   Lab:  Last UDS/SWAB on 06/27/2017 was reviewed and was appropriate.      Review of Systems   Constitutional- + sleep disturbances, + activity intolerance  Musculoskeletal-  -pain  Neuro- - cognitive changes,  +radicular, + neuropathic symptoms  GI/-  - constipation, - urinary difficulty  Psych-  + mood disorders,  - taking medication in a fashion other than prescribed    Objective:     Vitals:    07/21/17 1455   BP: 124/63   Pulse: 72   Resp: 16   Weight: 160 lb (72.6 kg)   Height: 5' 3.5\" (1.613 m)   PainSc:   7     Constitutional:  Pleasant and cooperative female who presents alone today.   Psychiatric: Mood and affect are appropriate for the situation, setting and topic of discussion.  Patient does not appear sedated.  Integumentary:  Observed skin WNL  HEENT: EOM's grossly intact.    Chest: Breathing is non-labored.   Neurological:  Alert and oriented in all spheres including: time, place, person and situation.  Durable Medical Equipment: knee kennyseal tape    Assessment:   Sandy Spencer is a 76 y.o. female seen in clinic today for follow-up of lower extremity chronic regional pain syndrome, chronic headaches, abdominal pain with recurrent pancreatitis.  Today she reports her pain is still the same.  Last office visit I changed her medication to morphine 30 mg 4 times a day.  It gets the edge off, but the pain will always be there.    She has started going to PT, Sister Kye Maolamide and she is working with Yogi Guo PT. her next appointment is 8//17.  She reports that she was told  At physical therapy her right calf has atrophied from the RSD.  Also her knee pain and groin pain is because something is not functioning correct.  Her ankles have been affected by the RSD.     She had a prescription " for Nucynta which was not sent to pharmacy.  I talked to her because he was medication I was planning on changing her medications to what I found that the insurance will not cover that so we changed her medications from oxycodone to morphine during last office visit.     She has a sleep study done and she was about 49.8 events per hour with the lowest oxygen of 86% so she will be getting a CPAP for sleep .  She reports that she noticed trazodone is not working but at this point I advised that due to her sleep study results I would be cautious in changing her sleeping medication.  Recommend to follow-up with primary care and also she might want to wait until after she receives her CPAP.      Today her treatment will remain the same.  Morphine IR 30 mg 4 times a day.    Plan:   Plan/NextSteps:     Medication:   Medication prescribed today Morphine 30 mg 4 times a day to fill 8/11/2017 X28 days disp #112 tabs.      REFILL INSTRUCTIONS:  Please contact the clinic refill line 7 days before your refill is due. Speak clearly; note cell phones cut in-and-out and poor quality speech and reception issues will influence our ability to hear you and be efficient with your prescription.     Call [phone] leave:   Your name (first and last w/ spelling)   Date of birth  Name of all the medication(s) being requested  Dose of the medication(s)   How you are taking the medication (eg. twice per day etc).     Contact your pharmacy 3 (three) days after leaving your message to see if your prescription has been received. Please request the pharmacy check your profile to be certain about any concerns with a script failing to be received. Note: Ashley updates have been inconsistent.  If the script has not been received there may have been a problem with the communication please reach back out to the clinic.       Interventional: Continue with your injections as recommended.    Integrative Approaches: Stay active      Mental Health: Follow  up with you therapist as needed.      Health Maintenance: per primary care      Records: Reviewed to assist with preparation for the office visit and are reflected throughout the note.     Health Maintenance: per primary care    Records: Reviewed to assist with preparation for the office visit and are reflected throughout the note.    Follow up: 1 month      Education: Please call Monday-Friday for problems or questions and one of the clinical support staff (CSS) will help to get things figured out. The number is [phone]. Some folks are using [employer] to send and e-mail. Please remember some issues require an office visit.     Reviewed the plan of care, provided justification and answered questions with the patient.     SAFETY REMINDERS  No alcohol while taking controlled substances. Alcohol is not an illegal substance, it is unsafe to use in combination. It is a build up of substances in the body that can be extremely hazardous and may cause respirations to slow to a dangerous rate resulting in hospitalization, brain damage, or death.    Opioid medications have been associated with sharp rise in unintentional overdose and death.  Overdose is a condition characterized by the consumption in excess of a particular drug causing adverse effects. Symptoms of overdose include: breathing slow and shallow, erratic or not at all, pinpoint pupils, hallucinations, confusion, muscle jerks, slack muscles, extreme sleepiness or loss of alertness, awake but not able to talk, face pale or clammy, vomiting, for lighter skinned people, the skin tone turns bluish purple, for darker skinned people, it turns grayish or ashen. If in a situation where overdose is a concern engage the emergency response system (dial 911).    Do not sell, loan, borrow or share your opioid medication with anyone. Deaths have occurred as a result of this practice. It is illegal and patients are being prosecuted.       *Universal Precautions:   UDS/Swab-  06/06/2016   Consent-   Agreement- 04/14/2017  Pharmacy- as documented   - 07/21/2017 Reviewed, ok  Count- n/a  Psychological evaluation n/a  Pharmacogenetic testing- n/a  MME- 120    Management of opioid medication is inherently a moderate to high complex medial interaction based on the risk management required at each contact r/t risks and side effects.    Patient Arrived @ 1448 for a 1500 appointment.     TT: 40 - min  CT: over half spent in education and counseling as outlined in the plan.      Stacie Damon APRN FNP-C  [address] [place] [address]  [facility]  [phone]  [phone]

## 2021-06-12 NOTE — PROGRESS NOTES
Subjective:   Sandy Spencer is a 76 y.o. female who presents for evaluation of pain. Patient was last seen 07/21/2017.      Major issues:  1. Chronic pain syndrome    2. Reflex sympathetic dystrophy    3. Lumbar radiculopathy        CC: Pain  See rooming evaluation    HPI:   Impact of pain treatments:   Analgesia: different area different type of pain, poor pain control  ADL's: Work: She is retired on disability. Household: She is able to participate in some house chores. Social: she lives alone, she has a a son in the [facility], she is not able to socialize in a fashionable manner.   AE's: Denies    Aberrant behavior: denies    Headache:  Frequency of HA: daily  Duration of HA: days  Quality of HA: severe   Location of HA: occipital area  Severity of HA: moderate to severe   Stimulus for a HA: pain  Aura description: heavyness  #of HA days per mo: (15/mo to consider Botox)  Rehabilitation: none  Interventional: none  Prophylactic treatment: Imitrex  Abortive Medication for HA: Sumatriptan  Management: strategies pain medications      Aggravating factors: lifting, reaching above the head  Alleviating factors: medications, hot baths  Associated symptoms: increased pain  DME: none   Location/Laterality of the pain: headache, back pain, neck pain, RLE CRP  Timing: constant  Quality: gnawing, deep ache, burning  Severity:5/10 last OV 7/10    Activities Impaired by Increasing Pain Severity: F= 8  3-Enjoy  4-Work, Enjoy  5-Active, Mood Work Enjoy  6-Sleep, Active, Mood Work Enjoy  7-Walk, Sleep, Active, Mood Work Enjoy  8-Relate, Walk, Sleep, Active, Mood Work Enjoy      Medication: Patient is taking Morphine 30 mg 4 times a day, Sumatriptan 50 mg tablet every 2 hours as needed for pain,  gabapentin 300 mg 3 times a day, trazodone 100 mg take 2-4 tablets at night.     Last opioid dose wasMorphine IR last taken 08/28/17 @ 800a .       Interventional: She had injections with     Rehabilitation: [facility]     Integrative Approaches: Stays active     Mental Health: She has been referred to a psychotherapist by Dr. Rees.        Records: Reviewed to prepare for today's visits and reflected throughout the note.     Additional Problems: increased pain     Diagnostics:   Lab:  Last UDS/SWAB on 06/27/2017 was reviewed and was appropriate.    Review of Systems   Constitutional- + sleep disturbances, + activity intolerance  Resp:  Denies SOB  Musculoskeletal- + pain  Neuro- - cognitive changes, + radicular, + neuropathic symptoms  GI/-  - constipation, - urinary difficulty  Psych-  + mood disorders,  - taking medication in a fashion other than prescribed    Objective:     Vitals:    08/28/17 0845   BP: 158/90   Pulse: 81   Resp: 16   Weight: 160 lb (72.6 kg)   Height: 5' 3.5\" (1.613 m)   PainSc:   5       Constitutional:  Pleasant and cooperative female who presents alone today.   Psychiatric: Mood and affect are appropriate for the situation, setting and topic of discussion.  Patient does not appear sedated.  Integumentary:  Observed skin WNL  Musculoskeletal:  +pain  HEENT: EOM's grossly intact.    Chest: Breathing is non-labored.   Neurological:  Alert and oriented in all spheres including: time, place, person and situation.  Durable Medical Equipment: none    Assessment:   Sandy Spencer is a 76 y.o. female seen in clinic today for follow-up of lower extremity chronic regional pain syndrome of the right leg, chronic headaches, abdominal pain with recurrent pancreatitis.  Today she reports that her pain is still the same she does not feel like her medications are working.  She feels like sometimes injections are not effective for her pain.  She also reports that she goes to physical therapy 2 times a week, and she feels like it somehow assistive for her pain.  She reports there right leg pain to be a cold and numb feeling.  She has been using tape as shown by her PT and " she says sometimes it is assistive for her pain.    She says recently she has started using a new CPAP machine which makes it difficult for her to get some sleep therefore she find herself in a lot of pain, uncomfortable, tired and  Restless.  She has told Dr. Rees about her concerns who has recommended for her to follow-up with a psychotherapist and she is waiting for an appointment.    She continues to use morphine 30 mg 4 times a day and she reports that this is not effective for her pain.  I will increase her morphine by 1 tab to make it 5 tablets a day.  I will discuss with Dr. Lynn about her getting recertification for  medical cannabis. Due to her sensitivity and a long list of allergies, I am not going to start her on any new medications I will have her make an appointment with the pharmacist.      She will schedule an appointment for follow-up with Dr. Lynn.    Plan:   Plan/NextSteps:   Morphine 30 mg 5 tabs a day, this is a dose increase from 4 tablets a day.    Medication:   Medication prescribed today Morphine 30 mg 5 tablets a day    REFILL INSTRUCTIONS:  Please contact the clinic refill line 7 days before your refill is due. Speak clearly; note cell phones cut in-and-out and poor quality speech and reception issues will influence our ability to hear you and be efficient with your prescription.     Call [phone] leave:   Your name (first and last w/ spelling)   Date of birth  Name of all the medication(s) being requested  Dose of the medication(s)   How you are taking the medication (eg. twice per day etc).     Contact your pharmacy 3 (three) days after leaving your message to see if your prescription has been received. Please request the pharmacy check your profile to be certain about any concerns with a script failing to be received. Note: Ashley updates have been inconsistent.  If the script has not been received there may have been a problem with the communication please reach back out to  the clinic.       Interventional: She gets injections [facility].    Rehabilitation: Continue with PT as recommended    Integrative Approaches: Stay active.    MTM:  Make an appointment with the pharmacist for MTM visit.     Mental Health: Make an appointment with Brinda     Health Maintenance: per primary care    Diagnostics: UDS/SWAB collected 06/27/2017.  UDS/SWAB:  Patient required a random Urine Drug Screen, due to the need to comply with [place] [employer] Policy Guidelines for the use of any controlled substances. This is to ensure that patient is compliant with treatment, and to diminish diversion, abuse, or any other aberrant behaviors. Patient is either being considered for or taking a controlled substance. Unexpected findings will be discussed and treatment decision may be adjusted.       Records: Reviewed to assist with preparation for the office visit and are reflected throughout the note.    Follow up: 1 month      Education: Please call Monday-Friday for problems or questions and one of the clinical support staff (CSS) will help to get things figured out. The number is [phone]. Some folks are using [employer] to send and e-mail. Please remember some issues require an office visit.     Reviewed the plan of care, provided justification and answered questions with the patient.     SAFETY REMINDERS  No alcohol while taking controlled substances. Alcohol is not an illegal substance, it is unsafe to use in combination. It is a build up of substances in the body that can be extremely hazardous and may cause respirations to slow to a dangerous rate resulting in hospitalization, brain damage, or death.    Opioid medications have been associated with sharp rise in unintentional overdose and death.  Overdose is a condition characterized by the consumption in excess of a particular drug causing adverse effects. Symptoms of overdose include: breathing slow and shallow, erratic or not at all, pinpoint  pupils, hallucinations, confusion, muscle jerks, slack muscles, extreme sleepiness or loss of alertness, awake but not able to talk, face pale or clammy, vomiting, for lighter skinned people, the skin tone turns bluish purple, for darker skinned people, it turns grayish or ashen. If in a situation where overdose is a concern engage the emergency response system (dial 911).    Do not sell, loan, borrow or share your opioid medication with anyone. Deaths have occurred as a result of this practice. It is illegal and patients are being prosecuted.       *Universal Precautions:   UDS/Swab- 06/06/2016   Consent-   Agreement- 04/14/2017  Pharmacy- as documented   - 08/28/2017 Reviewed, ok  Count- n/a  Psychological evaluation n/a  Pharmacogenetic testing- n/a  MME- 150    Management of opioid medication is inherently a moderate to high complex medial interaction based on the risk management required at each contact r/t risks and side effects.    TT: 40 - min  CT: over half spent in education and counseling as outlined in the plan.      Stacie MAIER FNP-C  [address] [place] [address]  [facility]  [phone]  [phone]

## 2021-06-12 NOTE — TELEPHONE ENCOUNTER
Patient called stating she received a letter to set up a f/u appointment.  She will be mailed out a 24 hour urine kit, and appointment made for a f/u ct.  Will call patient when results of testing come back to set up f/u visit.  Taylor Canchola RN

## 2021-06-12 NOTE — PROGRESS NOTES
"Sandy Spencer is a 80 y.o. female who is being evaluated via a billable telephone visit.      The patient has been notified of following:     \"This telephone visit will be conducted via a call between you and your physician/provider. We have found that certain health care needs can be provided without the need for a physical exam.  This service lets us provide the care you need with a short phone conversation.  If a prescription is necessary we can send it directly to your pharmacy.  If lab work is needed we can place an order for that and you can then stop by our lab to have the test done at a later time.    Telephone visits are billed at different rates depending on your insurance coverage. During this emergency period, for some insurers they may be billed the same as an in-person visit.  Please reach out to your insurance provider with any questions.    If during the course of the call the physician/provider feels a telephone visit is not appropriate, you will not be charged for this service.\"    Patient has given verbal consent to a Telephone visit? Yes    What phone number would you like to be contacted at? [phone]    Patient would like to receive their AVS by AVS Preference: Danish.    Additional provider notes:        SUBJECTIVE:  Sandy Spencer is a 80 y.o. female  who presents for follow up.     She is wondering what is going on with her kidneys. She is drinking more water, has lost her appetite. She does wonder if she has had some allergies over the weekend. It is better today.     She does have a lot of phlegm in the morning when she gets up.     She has been having pain and is working with PT on this. She does wonder if she is having issues with her kidney with this. She does note that she is getting tired again.     She is getting too many Imitrex. She is waking up with them with headaches, will take one righta way so it doesn't get worse. She did have her lamictal increased to help with her " headaches as well as increased her zonisamide.     She is not sleeping so well without the notriptyline. She does wonder about her trazodone.   Chief Complaint   Patient presents with     Follow-up     LAB RESULTS KIDNEY FUNCTION     Letter for School/Work     NEEDING LETTER FOR SOCIAL SECURITY      Medication Refill     MED CHECK          Patient Active Problem List   Diagnosis     Chronic kidney disease (CKD) stage G3a/A1, moderately decreased glomerular filtration rate (GFR) between 45-59 mL/min/1.73 square meter and albuminuria creatinine ratio less than 30 mg/g     Focal glomerular sclerosis     RSD lower limb, bilateral     ADD (attention deficit disorder)     RSD upper limb, right     Osteopenia     Major depression     Hypertension     Insomnia     Mixed hyperlipidemia     Right rotator cuff tear     Cluster headaches     Lumbar radiculopathy     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     Temporomandibular joint-pain-dysfunction syndrome (TMJ)     Localized swelling of lower leg     Acquired hypothyroidism     Chronic pain syndrome     Snoring     Abdominal pain, generalized     Dermatochalasis of left eyelid     Bilateral carotid artery stenosis     Coronary artery disease involving native coronary artery of native heart without angina pectoris     Sinus bradycardia     Other chronic pulmonary embolism without acute cor pulmonale (H)     Splenic infarction     Opioid type dependence, continuous (H)     Hematuria     Hydronephrosis     Bladder spasms     Anxiety disorder due to medical condition     Family relationship problem     History of posttraumatic stress disorder (PTSD)     Generalized muscle weakness     Myofascial pain     Moderate major depression (H)     Elevated lipase     Abdominal bloating     Acquired absence of other specified parts of digestive tract     Ampullary stenosis     Obstruction of biliary tree     Anemia     Aphthous ulcer of ileum     Bipolar disorder, unspecified (H)      Disorder of phosphorus metabolism     Edema     Gastroesophageal reflux disease without esophagitis     Obstruction of common bile duct     Overflow diarrhea     History of partial colectomy     Complex regional pain syndrome     Solitary left kidney     Flank pain     Hypocitraturia     Low urine output       Current Outpatient Medications   Medication Sig Dispense Refill     calcium carb/vitamin D3/vit K1 (VIACTIV ORAL) Take 1 tablet by mouth daily.       evolocumab 140 mg/mL PnIj Inject 140 mg under the skin every 14 (fourteen) days. 6 mL 3     fentaNYL (DURAGESIC) 25 mcg/hr Place 1 patch on the skin every other day for 10 days. 15 patch 0     fentaNYL (DURAGESIC) 50 mcg/hr Place 1 patch on the skin every other day for 10 days. 15 patch 0     lamoTRIgine (LAMICTAL) 100 MG tablet Two morning, one and one-half bedtime for one week, then two twice a day 120 tablet 2     levothyroxine (LEVO-T) 50 MCG tablet Take 1 tablet (50 mcg total) by mouth Daily at 6:00 am. 90 tablet 3     potassium citrate (UROCIT-K) 10 mEq (1,080 mg) SR tablet Take 2 tablets (20 mEq total) by mouth 2 (two) times a day. 360 tablet 1     rosuvastatin (CRESTOR) 40 MG tablet Take 1 tablet (40 mg total) by mouth at bedtime. 90 tablet 3     traZODone (DESYREL) 100 MG tablet Take 4 tablets (400 mg total) by mouth at bedtime. TAKE  4 TABLETS(400 MG) BY MOUTH AT BEDTIME AS NEEDED FOR SLEEP 360 tablet 1     warfarin ANTICOAGULANT (COUMADIN) 2.5 MG tablet Take 1 tablet (2.5 mg total) by mouth See Admin Instructions. Take 2.5 to 7.5 mg daily, adjusted for INR 90 tablet 3     warfarin ANTICOAGULANT (COUMADIN/JANTOVEN) 5 MG tablet Take one to two tablets (5 to 10 mg) by mouth daily. Adjust dose based on INR results as directed. 180 tablet 3     zonisamide (ZONEGRAN) 25 MG capsule One daily for one week, then one twice a day 60 capsule 2     enoxaparin ANTICOAGULANT (LOVENOX) 60 mg/0.6 mL syringe INJECT 0.6ML UNDER SKIN EVERY 12 HOURS       naloxone  (NARCAN) 4 mg/actuation nasal spray 1 spray (4 mg dose) into one nostril for opioid reversal. Call 911. May repeat if no response in 3 minutes. 1 Box 0     sodium phosphates 133 mL (FLEET ENEMA) 19-7 gram/118 mL Enem rectal enema Take as directed by the preparation instructions       SUMAtriptan (IMITREX) 50 MG tablet Take 1 tablet (50 mg total) by mouth every 2 (two) hours as needed for migraine. 9 tablet 5     valACYclovir (VALTREX) 1000 MG tablet TAKE ONE TABLET BY MOUTH THREE TIMES A DAY FOR 7 DAYS as needed for outbreaks 42 tablet 2     Current Facility-Administered Medications   Medication Dose Route Frequency Provider Last Rate Last Dose     teriparatide injection 20 mcg (FORTEO)  20 mcg Subcutaneous DAILY Caroline Sumner NP           Social History     Tobacco Use   Smoking Status Former Smoker     Packs/day: 1.00     Years: 20.00     Pack years: 20.00     Quit date: 2000     Years since quittin.7   Smokeless Tobacco Never Used           OBJECTIVE:        Recent Results (from the past 240 hour(s))   INR   Result Value Ref Range    INR 1.80 (H) 0.90 - 1.10   Comprehensive Metabolic Panel   Result Value Ref Range    Sodium 137 136 - 145 mmol/L    Potassium 4.1 3.5 - 5.0 mmol/L    Chloride 102 98 - 107 mmol/L    CO2 28 22 - 31 mmol/L    Anion Gap, Calculation 7 5 - 18 mmol/L    Glucose 85 70 - 125 mg/dL    BUN 28 8 - 28 mg/dL    Creatinine 1.57 (H) 0.60 - 1.10 mg/dL    GFR MDRD Af Amer 39 (L) >60 mL/min/1.73m2    GFR MDRD Non Af Amer 32 (L) >60 mL/min/1.73m2    Bilirubin, Total 0.4 0.0 - 1.0 mg/dL    Calcium 8.3 (L) 8.5 - 10.5 mg/dL    Protein, Total 6.1 6.0 - 8.0 g/dL    Albumin 3.8 3.5 - 5.0 g/dL    Alkaline Phosphatase 47 45 - 120 U/L    AST 34 0 - 40 U/L    ALT 22 0 - 45 U/L   Lipid Cascade FASTING   Result Value Ref Range    Cholesterol 143 <=199 mg/dL    Triglycerides 47 <=149 mg/dL    HDL Cholesterol 89 >=50 mg/dL    LDL Calculated 45 <=129 mg/dL    Patient Fasting > 8hrs? Yes        There  were no vitals filed for this visit.  Weight: 141 lb 4.8 oz (64.1 kg)        Assessment/Plan:  1. Chronic kidney disease (CKD) stage G3a/A1, moderately decreased glomerular filtration rate (GFR) between 45-59 mL/min/1.73 square meter and albuminuria creatinine ratio less than 30 mg/g  -Doing well at this time, I did refer her to nephrology however.  We discussed that she probably needs to increase her water intake some.  She will continue on this.    2. Insomnia, unspecified type  -Doing well currently on the trazodone.  Renewing today.  - traZODone (DESYREL) 100 MG tablet; Take 4 tablets (400 mg total) by mouth at bedtime. TAKE  4 TABLETS(400 MG) BY MOUTH AT BEDTIME AS NEEDED FOR SLEEP  Dispense: 360 tablet; Refill: 1    3. Other chronic pulmonary embolism without acute cor pulmonale (H)  -Tolerating her warfarin right now.  She will continue on this and follow-up  - warfarin ANTICOAGULANT (COUMADIN) 2.5 MG tablet; Take 1 tablet (2.5 mg total) by mouth See Admin Instructions. Take 2.5 to 7.5 mg daily, adjusted for INR  Dispense: 90 tablet; Refill: 3    4. History of blood clots  - warfarin ANTICOAGULANT (COUMADIN) 2.5 MG tablet; Take 1 tablet (2.5 mg total) by mouth See Admin Instructions. Take 2.5 to 7.5 mg daily, adjusted for INR  Dispense: 90 tablet; Refill: 3    5. Splenic infarction  - warfarin ANTICOAGULANT (COUMADIN) 2.5 MG tablet; Take 1 tablet (2.5 mg total) by mouth See Admin Instructions. Take 2.5 to 7.5 mg daily, adjusted for INR  Dispense: 90 tablet; Refill: 3        Phone call duration:  20 minutes    Caitlyn Rees MD

## 2021-06-12 NOTE — TELEPHONE ENCOUNTER
----- Message from Caitlyn Rees MD sent at 10/8/2020 12:47 PM CDT -----  Sandy,     Your kidneys are fairly stable. I know you've seen urology before, but I don't think you've seen a nephrologist (kidney doctor). We should maybe consider this. Let me know what you think about this.     Your cholesterol is quite good with what you are on.     Your calcium is down just a tad. Please make sure you are getting enough in your diet.     Caitlyn Rees MD

## 2021-06-13 NOTE — TELEPHONE ENCOUNTER
E-lovenox unable to send. Called pharmacy spoke to pharmacist Pricilla: Lovenox 60mg/0.6mL syringe, take 60mg abdominal subcutaneous, daily, quanity 10 with no refills per Warren.     Patient informed of all findings above and below sent to preferred pharmacy. No further questions or concerns at this time. She is okay with INR recheck on 12/4, informed her okay to return sooner 11/30 if she desired related to adding on the lovenox, patient will keep 12/4 INR recheck.     Aye Rice RN

## 2021-06-13 NOTE — PROGRESS NOTES
Subjective:   Sandy Spencer is a 77 y.o. female who presents for evaluation of pain. Patient was last seen 10/2/17 Dr. Lynn.      Major issues:  1. Lumbar radiculopathy    2. RSD lower limb, bilateral    3. RSD upper limb, right        CC: Pain  See rooming evaluation    HPI:   Impact of pain treatments:   Analgesia: poor   ADL's: Work: she is on disability. Household: she is able to participate in some chores at home. Social: she lives alone, not able to socialize in a fashionable manner.   AE's: constipation   Aberrant behavior: denies    Aggravating factors: walking, sitting, activities, anything  Alleviating factors: medications, injections  Associated symptoms: recent hospitalization for withdrawals and severe constipation.  DME: none  Location/Laterality of the pain: back pain, neck pain, knee pain, CRP  Timing: constant  Quality: crushing, aching  Severity:6/10 last OV 7/10    Activities Impaired by Increasing Pain Severity: F= 8  3-Enjoy  4-Work, Enjoy  5-Active, Mood Work Enjoy  6-Sleep, Active, Mood Work Enjoy  7-Walk, Sleep, Active, Mood Work Enjoy  8-Relate, Walk, Sleep, Active, Mood Work Enjoy      Medication: Patient is taking Sumatriptan 50 mg tablet every 2 hours as neded for pain,  gabapentin 300 mg 3 times a day, trazodone 100 mg take 2-4 tablets at night, ketamine spray.      Last opioid dose was Fentanyl patch 25 mcg/hr last applied 10/16/2017 @ 1100 am.       Interventional: She gents injections with Dr. Mittal.    Integrative Approaches: Stays active.    Records: Reviewed to prepare for todays visits and reflected throughout the note.    Diagnostics:   Lab:  No lab ordered today.    Review of Systems   Constitutional- + sleep disturbances, + activity intolerance  Respiratory: denies SOB  Musculoskeletal- + pain  Neuro- - cognitive changes, + radicular, + neuropathic symptoms  GI/-  - constipation, - urinary difficulty  Psych-  + mood disorders,  - taking medication in a fashion  "other than prescribed    Objective:     Vitals:    10/16/17 1259   BP: 126/69   Pulse: (!) 59   Resp: 16   Weight: 156 lb (70.8 kg)   Height: 5' 3.5\" (1.613 m)   PainSc:   6       Constitutional:  Pleasant and cooperative female who presents alone today.   Psychiatric: Mood and affect are appropriate for the situation, setting and topic of discussion.  Patient does not appear sedated.  Integumentary:  Observed skin WNL, fentanyl patch 25 mcg on the right thigh, old patch on the left deltoid.    HEENT: EOM's grossly intact.    Respiratory:  No signs of distress noted, breathing is non-labored.   Musculoskeletal:  + pain  Neurological:  Alert and oriented in all spheres including: time, place, person and situation.  Durable Medical Equipment: none    Assessment:   Sandy Spencer is a 77 y.o. female seen in clinic today for chronic pain syndrome, reflex sympathetic dystrophy, lumbar radiculopathy and recurrent pancreatitis . She has been admitted X 2 since last office visit due to withdrawal symptoms and for severe constipation/impaction and incontinence.  She reports that she has stopped taking morphine and now only using the patch.  The patch is not sticking well so she feels like she is not able to get maximum effect of the medications.  She reports that the patch is elias to take \"the edge off\" she does not feel like her colon is burning.  She is happy with the patch and she is not interested in taking any more pills.    She continues to use ketamine but not sure if it is effective for her pain.      Todays visit, no changes made to her medications.  Will continue with fentanyl patch 25 mcg/hr.        Plan:   Plan/NextSteps:   -Methylprednisolone 20 mg X 5 days for inflammation  Fentanyl 25 mcg/hr to change every 3 days to fill 11/3/17 to use from 11/6/17    Medication:   -Methylprednisolone 20 mg X 5 days for inflammation  Fentanyl 25 mcg/hr to change every 3 days to fill 11/3/17 to use from 11/6/17    REFILL " INSTRUCTIONS:  Please contact the clinic refill line 7 days before your refill is due. Speak clearly; note cell phones cut in-and-out and poor quality speech and reception issues will influence our ability to hear you and be efficient with your prescription.     Call [phone] leave:   Your name (first and last w/ spelling)   Date of birth  Name of all the medication(s) being requested  Dose of the medication(s)   How you are taking the medication (eg. twice per day etc).     Contact your pharmacy 3 (three) days after leaving your message to see if your prescription has been received. Please request the pharmacy check your profile to be certain about any concerns with a script failing to be received. Note: Ashley updates have been inconsistent.  If the script has not been received there may have been a problem with the communication please reach back out to the clinic.       Interventional: Follow up with Dr. Mittal for injections.     Integrative Approaches: Stay active.     MTM:  Follow up as recommended.     Health Maintenance: per primary care    Diagnostics: UDS/SWAB collected 06/27/17 results are appropriate.  UDS/SWAB:  Patient required a random Urine Drug Screen, due to the need to comply with [place] Model Policy Guidelines for the use of any controlled substances. This is to ensure that patient is compliant with treatment, and to diminish diversion, abuse, or any other aberrant behaviors. Patient is either being considered for or taking a controlled substance. Unexpected findings will be discussed and treatment decision may be adjusted.       Records: Reviewed to assist with preparation for the office visit and are reflected throughout the note.    Follow up: 1 month Dr. Lynn.      Education: Please call Monday-Friday for problems or questions and one of the clinical support staff (CSS) will help to get things figured out. The number is [phone]. Some folks are using [facility] to send and  e-mail. Please remember some issues require an office visit.     Reviewed the plan of care, provided justification and answered questions with the patient.     SAFETY REMINDERS  No alcohol while taking controlled substances. Alcohol is not an illegal substance, it is unsafe to use in combination. It is a build up of substances in the body that can be extremely hazardous and may cause respirations to slow to a dangerous rate resulting in hospitalization, brain damage, or death.    Opioid medications have been associated with sharp rise in unintentional overdose and death.  Overdose is a condition characterized by the consumption in excess of a particular drug causing adverse effects. Symptoms of overdose include: breathing slow and shallow, erratic or not at all, pinpoint pupils, hallucinations, confusion, muscle jerks, slack muscles, extreme sleepiness or loss of alertness, awake but not able to talk, face pale or clammy, vomiting, for lighter skinned people, the skin tone turns bluish purple, for darker skinned people, it turns grayish or ashen. If in a situation where overdose is a concern engage the emergency response system (dial 911).    Do not sell, loan, borrow or share your opioid medication with anyone. Deaths have occurred as a result of this practice. It is illegal and patients are being prosecuted.       *Universal Precautions:   UDS/Swab- 06/06/2016   Consent-   Agreement- 04/14/2017  Pharmacy- as documented   - 10/16/17 reviewed, ok  Count- 268 Morphine 30 mg  Psychological evaluation n/a  Pharmacogenetic testing- n/a  MME- 150     Management of opioid medication is inherently a moderate to high complex medial interaction based on the risk management required at each contact r/t risks and side effects.      TT: 25 - min  CT: over half spent in education and counseling as outlined in the plan.    Stacie MAIER CNP-C  [address] [place] [address]  [facility]  [place]  [phone]  [phone]

## 2021-06-13 NOTE — TELEPHONE ENCOUNTER
Who is calling:  Sandy  Reason for Call:  Patient called requesting to speak with Angie. She has misplaced her instructions.  Date of last appointment with primary care: 11/17/2020  Okay to leave a detailed message: Yes

## 2021-06-13 NOTE — TELEPHONE ENCOUNTER
[facility] pharmacy calls, request for a new RX for fentanyl 75 mcg patches, they received a new script for next month but patient will need a refill this month.  Last fill of fentanyl 75 mcg-11/5-14 patches-28 days    Will cue current RX for this month.    Requested Prescriptions     Pending Prescriptions Disp Refills     fentaNYL (DURAGESIC) 75 mcg/hr 15 patch 0     Sig: Place 1 patch on the skin every other day.     [facility]

## 2021-06-13 NOTE — TELEPHONE ENCOUNTER
FYI - Status Update  Who is Calling: Patient  Update: Patient reported having dizziness and lightheadedness for the last 1.5 days, the patient believes it could be due to the change in warfarin and the start of the Enoxaparin injections that began last week. Patient was transferred to triage for the symptoms and told that a message would be sent to the ACN team.  Okay to leave a detailed message?:  Yes
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
## 2021-06-13 NOTE — PROGRESS NOTES
Order for Durable Medical Equipment was processed and equipment ordered.   DME provider: Kylah  Date Faxed: 09/18/2017  Ordering Provider: Dr. Kyle  Equipment ordered: Transfer DME to [place] from [facility]

## 2021-06-13 NOTE — TELEPHONE ENCOUNTER
Provided dosing instructions. Patient able to write them down and repeat. Please see encounter 11/24 for further details of dosing. Anticipating Lovenox is going to be added.     Aye Rice RN

## 2021-06-13 NOTE — PROGRESS NOTES
Attempt 1-Care Guide called patient.  If this patient is returning our call please transfer to Carolyn at ext. [phone].
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
## 2021-06-13 NOTE — TELEPHONE ENCOUNTER
Spoke with patient and relayed Dr. Rees's message. Patient said they were trying to get her in to INR clinic at [place] this afternoon because she has a PT appointment there at 11am. Advised patient to go to ED if she feels she needs to go there before they figure out an INR appointment. She is in agreement.    Charu Foss, CMA

## 2021-06-13 NOTE — PROGRESS NOTES
Care Guide called patient.  If this patient is returning our call please transfer to Carolyn at ext. [phone].   I have called (patient) 3 times over the past two weeks and have been unsuccessful in reaching them. This patient has also not returned any of my messages.     I will continue attempting to reach out to this patient in one month. I will also check this patient s chart for upcoming appointments, ER reports that may contain a new phone number, or any other recent activity.

## 2021-06-13 NOTE — TELEPHONE ENCOUNTER
"Patient calling;  She is reporting that her ;    INR  Was running low, and was started on Lovenox.last week.  Patient states she is experiencing lightheadedness and dizziness, and states \"I just don't feel right , and am suppose to have another INR on Friday\".    Call was also sent to [facility]. To speak to nurse, and to Dr. Rees .  Please advise.    Ricarda Worrell RN  Care Connection Triage/refill nurse    Reason for Disposition    Taking a medicine that could cause dizziness (e.g., blood pressure medications, diuretics)    Additional Information    Negative: Fever present > 3 days (72 hours)    Negative: [1] MODERATE dizziness (e.g., interferes with normal activities) AND [2] has NOT been evaluated by physician for this  (Exception: dizziness caused by heat exposure, sudden standing, or poor fluid intake)    Protocols used: DIZZINESS - [id number]      "

## 2021-06-13 NOTE — PROGRESS NOTES
Chart reflects hospitalizations for GI concerns, with impaction, diarrhea.  She has seenFelicia Damon NP and transition from morphine to fentanyl 25 mg.    On interview she brings in a bottle of morphine 100 tablets that she has found her home.  She describes to be in the hospital twice.  She had a severe impaction.  Part of this had been running out of her smooth move tea and Metamucil.  She also had misplaced medications from one person to another and went from morphine 5 tablets a day to 2 tablets a day during that time had withdrawal and the impaction.  She was embarrassed to call here for refills.    She has been found to have colitis, been placed on prednisone with a taper.  There is still significant inflammation and throbbing pain.    She is on fentanyl 25 mcg.  He requests increasing to 50 mcg for withdrawal and pain she reviews she was on up to 100 mcg in the past.  She continues with pain in her knees and back as well as a more recent pain with the colon.  She notes it is hard to keep the fentanyl patch on is she is significant sweating at night.  This is occurred even before the withdrawal.  It is unclear the etiology though worse presently with withdrawal.    She does find the BuSpar 15 mg twice a day has been helpful for anxiety would like to continue with that.    She is tried ketamine 1 or 2 times a day, has not had any benefit.  Reviewed she needed to have a higher dose perhaps.    She had been going to physical therapy weekly.  She had 5 injections in her knee.  She is seeking another opinion for management.    She reviews costs with her medical appointments.  She had been given a prescription for what sounds may be a probiotic that she cannot afford.    She has an appointment scheduled with Brinda Burns for therapy in December.  She hopes she can see somebody sooner.  She had seen a previous therapist though not for 7 months and there are family psychosocial stressors.      Current Outpatient  Prescriptions:      aspirin 81 MG EC tablet, Take 81 mg by mouth daily., Disp: , Rfl:      atorvastatin (LIPITOR) 40 MG tablet, Take 1 tablet (40 mg total) by mouth at bedtime., Disp: 90 tablet, Rfl: 3     azelastine (ASTEPRO) 0.15 % (205.5 mcg) Spry nasal spray, 1 spray by Left Nare route 2 (two) times a day as needed., Disp: , Rfl:      busPIRone (BUSPAR) 15 MG tablet, Take 1 tablet (15 mg total) by mouth 2 (two) times a day., Disp: 180 tablet, Rfl: 1     cholecalciferol, vitamin D3, 5,000 unit Tab, Take 1 tablet by mouth daily., Disp: , Rfl:      cloNIDine HCl (CATAPRES) 0.1 MG tablet, TAKE 1 TABLET BY MOUTH TWICE DAILY, Disp: 28 tablet, Rfl: 0     diphenhydrAMINE (BENADRYL) 25 mg capsule, Take 25 mg by mouth every 6 (six) hours as needed. , Disp: , Rfl:      [START ON 11/6/2017] fentaNYL (DURAGESIC) 25 mcg/hr, Place 1 patch on the skin every third day., Disp: 10 patch, Rfl: 0     furosemide (LASIX) 40 MG tablet, Take 0.5-1 tablets (20-40 mg total) by mouth daily as needed., Disp: 90 tablet, Rfl: 1     Lactobacillus rhamnosus GG (CULTURELLE) 15 billion cell CpSP, Take 1 capsule by mouth daily., Disp: , Rfl:      levothyroxine (SYNTHROID, LEVOTHROID) 50 MCG tablet, Take 1 tablet (50 mcg total) by mouth daily., Disp: 90 tablet, Rfl: 1     losartan (COZAAR) 25 MG tablet, Take 1 tablet (25 mg total) by mouth 2 (two) times a day., Disp: 180 tablet, Rfl: 1     methylPREDNISolone (MEDROL DOSEPACK) 4 mg tablet, follow package directions, Disp: 21 tablet, Rfl: 0     OMEGA-3/DHA/EPA/FISH OIL (FISH OIL-OMEGA-3 FATTY ACIDS) 300-1,000 mg capsule, Take 1.2 g by mouth 2 (two) times a day., Disp: , Rfl:      omeprazole (PRILOSEC) 20 MG capsule, Take 1 capsule (20 mg total) by mouth daily., Disp: 90 capsule, Rfl: 1     ondansetron (ZOFRAN-ODT) 4 MG disintegrating tablet, Take 1 tablet (4 mg total) by mouth every 8 (eight) hours as needed for nausea., Disp: 15 tablet, Rfl: 3     psyllium (METAMUCIL) 3.4 gram packet, Take 1 packet  "by mouth 2 (two) times a day., Disp: , Rfl: 0     SUMAtriptan (IMITREX) 50 MG tablet, Take 1 tablet (50 mg total) by mouth every 2 (two) hours as needed for migraine., Disp: 27 tablet, Rfl: 0     tiZANidine (ZANAFLEX) 4 MG tablet, Take 1 tablet (4 mg total) by mouth every 6 (six) hours as needed., Disp: 30 tablet, Rfl: 0     traZODone (DESYREL) 100 MG tablet, Take 2-4 tablets (200-400 mg total) by mouth at bedtime., Disp: 360 tablet, Rfl: 1     verapamil (CALAN-SR) 240 MG CR tablet, Take 1 tablet (240 mg total) by mouth bedtime., Disp: 90 tablet, Rfl: 1     fentaNYL (DURAGESIC) 12 mcg/hr, Place 1 patch on the skin every other day., Disp: 15 patch, Rfl: 0  Blood pressure 113/68, pulse 66, resp. rate 16, height 5' 3\" (1.6 m), weight 158 lb (71.7 kg), not currently breastfeeding.    In score 6.  She is alert with a clear sensorium Monae groomed with attention to makeup.  No cervical pain behavior.  No diaphoresis noted.    Impression chronic pain his related to diffuse osteoarthritis, history of lumbar surgeries, concerns for chronic regional pain symptoms in the past that have been less active.  More recently with significant GI concerns following history of gallbladder surgery, impaction may be related to the opioids.  She is on a relatively lower dose    Plan: Just concerns with increasing opioids and her risk for having issues with constipation again.  He may be having some sweating with gait related withdrawal symptoms.    We discussed a plan to add an additional 12 mcg patch that she would alternate 25 mcg one day 12 the next.  In this way if they are not staying on well she will still have more of a steady state level.    During this time we will increase ketamine as tolerated to help with pain.  Reviews will be less likely to have constipation issues.    She will use tizanidine to help with withdrawal symptoms.  She notes her blood pressure has been erratic and that may settle out.    We will continue the " BuSpar 50 mg twice a day for anxiety.    She will see if she can schedule earlier with Hannah or Brinda supportive therapy.  Her graft time spent 25 minutes face-to-face more than 50% count about above condition and coordination treatment plan

## 2021-06-13 NOTE — PROGRESS NOTES
ASSESSMENT/PLAN:       1. Accelerated hypertension  -Patient is measuring blood pressures at home in the 80s and 90s systolic.  Given this, I am going to have her see if she can cut her losartan in half and take one half pill orally 2 times daily.  If she cannot split them and she will take just 25 mg at bedtime.  She will follow-up here in approximately 4-6 weeks, sooner if this is not helping.    2. Mixed hyperlipidemia  -Lipids have been stable, last checked in September.  Continue on current medications and follow-up in approximately 4 months.    3. Chronic pain syndrome  -The patient feels her pain is better controlled with the alternating fentanyl patches.  I congratulated her on this, and she will follow-up with the pain clinic as they recommended.    25 minutes was spent face-to-face with the patient during this encounter and >50% of this was spent on counseling and coordination of care. We discussed in depth the topics listed above.           Caitlyn Rees MD      PROGRESS NOTE   10/27/2017    SUBJECTIVE:  Sandy Spencer is a 77 y.o. female  who presents for follow up.     The patient has been struggling quite a bit with her pain management over the last month.  They were discontinuing and decreasing her morphine which is causing her to feel like she was going through withdrawal.  She additionally got quite constipated.  She is no longer constipated that has improved.  They additionally have adjusted her medication so she is now overlapping her fentanyl patches which has made it so that she slept 6 hours straight and had no night sweats.      She was having colon spasms and terrible nausea while in the hospital which was related to her constipation.    She did have a DXA scan on Wednesday. She is quite frustrated with the hospital. She had a lab draw while she was sleeping, and she jumped when she was getting a lab draw. She had her IV infiltrate.     Her blood pressure is dropping in the  afternoon. It was 140 this morning. It was 110 at noon. She did take the losartan, will not take it if it is under 120. She will take the BP again before she goes to bed. She will note that her BP will drop to the high 70s-80s/40s in the afternoon.   Chief Complaint   Patient presents with     Follow-up     Patient is here today for her 1 month follow up. Patient states her night sweats have gotten better but her blood pressure keeps crashing. Patient wonders about when she should take the Losartan.          Patient Active Problem List   Diagnosis     Chronic kidney disease (CKD) stage G3a/A1, moderately decreased glomerular filtration rate (GFR) between 45-59 mL/min/1.73 square meter and albuminuria creatinine ratio less than 30 mg/g     Focal glomerular sclerosis     Anxiety and depression     RSD lower limb, bilateral     ADD (attention deficit disorder)     RSD upper limb, right     Other specified hypothyroidism     Anxiety     Osteopenia     Major depression     Hypertension     Insomnia     Mixed hyperlipidemia     Right rotator cuff tear     Cluster headaches     Lumbar radiculopathy     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     Stenosis of lateral recess of lumbar spine     Reflex sympathetic dystrophy     Acute encephalopathy     Temporomandibular joint-pain-dysfunction syndrome (TMJ)     Pancreatitis     Localized swelling of lower leg     Medical cannabis use     Acute right-sided low back pain without sciatica     Acute exacerbation of chronic low back pain     Acute pancreatitis     Accelerated hypertension     Acquired hypothyroidism     Chronic pain syndrome     Non morbid obesity due to excess calories     Snoring     Inadequate pain control     Diarrhea     Dehydration     Abdominal pain       Current Outpatient Prescriptions   Medication Sig Dispense Refill     aspirin 81 MG EC tablet Take 81 mg by mouth daily.       atorvastatin (LIPITOR) 40 MG tablet Take 1 tablet (40 mg total) by  mouth at bedtime. 90 tablet 3     azelastine (ASTEPRO) 0.15 % (205.5 mcg) Spry nasal spray 1 spray by Left Nare route 2 (two) times a day as needed.       busPIRone (BUSPAR) 15 MG tablet Take 1 tablet (15 mg total) by mouth 2 (two) times a day. 180 tablet 1     cholecalciferol, vitamin D3, 5,000 unit Tab Take 1 tablet by mouth daily.       cloNIDine HCl (CATAPRES) 0.1 MG tablet TAKE 1 TABLET BY MOUTH TWICE DAILY 28 tablet 0     diphenhydrAMINE (BENADRYL) 25 mg capsule Take 25 mg by mouth every 6 (six) hours as needed.        fentaNYL (DURAGESIC) 12 mcg/hr Place 1 patch on the skin every other day. 15 patch 0     fentaNYL (DURAGESIC) 50 mcg/hr Place 1 patch on the skin every third day. 10 patch 0     fentaNYL 37.5 mcg/hour PT72 Place 37.5 mg on the skin every other day. 15 patch 0     furosemide (LASIX) 40 MG tablet Take 0.5-1 tablets (20-40 mg total) by mouth daily as needed. 90 tablet 1     KETAMINE HCL (KETAMINE, BULK,) 100 % Powd Ketamine 20 mg troches, take 20 mg under the tongue TID PRN 30 Bottle 2     Lactobacillus rhamnosus GG (CULTURELLE) 15 billion cell CpSP Take 1 capsule by mouth daily.       levothyroxine (SYNTHROID, LEVOTHROID) 50 MCG tablet Take 1 tablet (50 mcg total) by mouth daily. 90 tablet 1     losartan (COZAAR) 25 MG tablet Take 1 tablet (25 mg total) by mouth 2 (two) times a day. 180 tablet 1     OMEGA-3/DHA/EPA/FISH OIL (FISH OIL-OMEGA-3 FATTY ACIDS) 300-1,000 mg capsule Take 1.2 g by mouth 2 (two) times a day.       omeprazole (PRILOSEC) 20 MG capsule Take 1 capsule (20 mg total) by mouth daily. 90 capsule 1     ondansetron (ZOFRAN-ODT) 4 MG disintegrating tablet Take 1 tablet (4 mg total) by mouth every 8 (eight) hours as needed for nausea. 15 tablet 3     psyllium (METAMUCIL) 3.4 gram packet Take 1 packet by mouth 2 (two) times a day.  0     SUMAtriptan (IMITREX) 50 MG tablet Take 1 tablet (50 mg total) by mouth every 2 (two) hours as needed for migraine. 27 tablet 0     traZODone (DESYREL)  100 MG tablet Take 2-4 tablets (200-400 mg total) by mouth at bedtime. 360 tablet 1     verapamil (CALAN-SR) 240 MG CR tablet Take 1 tablet (240 mg total) by mouth bedtime. 90 tablet 1     tiZANidine (ZANAFLEX) 4 MG tablet Take 1 tablet (4 mg total) by mouth every 6 (six) hours as needed. 30 tablet 0     No current facility-administered medications for this visit.        History   Smoking Status     Former Smoker     Packs/day: 1.00     Years: 20.00     Quit date: 1/1/2000   Smokeless Tobacco     Never Used           OBJECTIVE:        No results found for this or any previous visit (from the past 240 hour(s)).    Vitals:    10/27/17 1445   BP: 128/72   Patient Site: Left Arm   Patient Position: Sitting   Cuff Size: Adult Regular   Pulse: 80   SpO2: 96%     Weight: 158 lb (71.7 kg)        Physical Exam:  GENERAL APPEARANCE: A&A, NAD, well hydrated, well nourished  SKIN:  Normal skin turgor, no lesions/rashes   HEENT: moist mucous membranes, no rhinorrhea  NECK: Normal  CV: RRR, no M/G/R   LUNGS: CTAB  ABDOMEN: Soft, no guarding or rebound, no palpable masses, mildly tender to palpation throughout  EXTREMITY: no edema   NEURO: no gross deficits   Psych: Her affect is mildly anxious, she is casually dressed and groomed, not tearful today

## 2021-06-13 NOTE — PROGRESS NOTES
[id number]:  [id number]    Patient name: Sandy Spencer    Care Guide: Carolyn     Discuss at Care Conferences:     Barriers: Unsuccessful outreach-2 ED visits with admission for nausea, voiting, diarrhea, on med marijuana   Plan Summary: updated MD on patient's upcoming follow-up appointment with partner provider and the pain clinic.      Action  Due Date   [facility] RN will:  n/a n/a   Delegations: Action  Due Date   [facility] SW will:  n/a n/a   [facility] Care Guide will: n/a n/a

## 2021-06-13 NOTE — TELEPHONE ENCOUNTER
As her PE is remote, I think it's best in this higher risk individual to continue to increase her coumadin dosing as she did not tolerate Eliquis and remained hesitant to other NOACs.     We could offer Lovenox again if she wanted to be on the safe side, again with remote history of clots, this is likely just to be on the safe side.

## 2021-06-13 NOTE — TELEPHONE ENCOUNTER
Spoke with patient, she reports feeling dizziness and lightheaded ongoing for the past day and 1/2. Informed patient these are typically not symptoms of Lovenox nor warfarin. However she does have a history of anemia. Writer encouraged to obtain INR recheck today, if symptoms worsen to go to ED.     Aye Rice RN
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
## 2021-06-13 NOTE — PROGRESS NOTES
"Sandy Spencer is a 80 y.o. female who is being evaluated via a billable telephone visit.      The patient has been notified of following:     \"This telephone visit will be conducted via a call between you and your physician/provider. We have found that certain health care needs can be provided without the need for a physical exam.  This service lets us provide the care you need with a short phone conversation.  If a prescription is necessary we can send it directly to your pharmacy.  If lab work is needed we can place an order for that and you can then stop by our lab to have the test done at a later time.    Telephone visits are billed at different rates depending on your insurance coverage. During this emergency period, for some insurers they may be billed the same as an in-person visit.  Please reach out to your insurance provider with any questions.    If during the course of the call the physician/provider feels a telephone visit is not appropriate, you will not be charged for this service.\"    Patient has given verbal consent to a Telephone visit? Yes    What phone number would you like to be contacted at? [phone]    Patient would like to receive their AVS by AVS Preference: Danish.    Additional provider notes:       Reason for visit      1. Osteopenia of multiple sites    2. Post-menopausal        History     Sandy Spencer is a very pleasant 80 y.o. old female who presents for follow up.   SUMMARY:    Sandy is contacted today in f/u for Osteopenia. She had two years of Prolia injections and then was rejected for therapy because of not having a Dx of Osteoporosis. She was supposed to start Forteo in June of this year with the stipulation that she wouldn't if she couldn't afford it. She did not follow up with us regarding this, and she has no memory of the conversation. She did not start therapy. She cannot take Bisphophonates because of her Renal status. She notes that she recently fell off of a " ladder. Fortunately, she did not injure herself. Her Calcium level is 9.0 and last Vit D level was 49.5.      Risk Factors      The following high- risk conditions have been ruled out: celiac disease, eating disorders, gastric bypass, hyperparathyroidism, inflammatory bowel disease, hyperthyroidism, rheumatoid arthritis, lupus, chronic kidney disease.     Sandy Spencer has the following risk factors: Age, Female gender,  and Family history of osteoporosis     She is not on high risk medications such as glucocorticoids, anti-coagulants, anti-convulsants, chemotherapy.    Patient deniesBreast cancer and Family history of breast cancer.      Past Medical History     Patient Active Problem List   Diagnosis     Chronic kidney disease (CKD) stage G3a/A1, moderately decreased glomerular filtration rate (GFR) between 45-59 mL/min/1.73 square meter and albuminuria creatinine ratio less than 30 mg/g     Focal glomerular sclerosis     RSD lower limb, bilateral     ADD (attention deficit disorder)     RSD upper limb, right     Osteopenia     Major depression     Hypertension     Insomnia     Mixed hyperlipidemia     Right rotator cuff tear     Cluster headaches     Lumbar radiculopathy     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     Temporomandibular joint-pain-dysfunction syndrome (TMJ)     Localized swelling of lower leg     Acquired hypothyroidism     Chronic pain syndrome     Snoring     Abdominal pain, generalized     Dermatochalasis of left eyelid     Bilateral carotid artery stenosis     Coronary artery disease involving native coronary artery of native heart without angina pectoris     Sinus bradycardia     Other chronic pulmonary embolism without acute cor pulmonale (H)     Splenic infarction     Opioid type dependence, continuous (H)     Hematuria     Hydronephrosis     Bladder spasms     Anxiety disorder due to medical condition     Family relationship problem     History of posttraumatic stress  "disorder (PTSD)     Generalized muscle weakness     Myofascial pain     Moderate major depression (H)     Elevated lipase     Abdominal bloating     Acquired absence of other specified parts of digestive tract     Ampullary stenosis     Obstruction of biliary tree     Anemia     Aphthous ulcer of ileum     Bipolar disorder, unspecified (H)     Disorder of phosphorus metabolism     Edema     Gastroesophageal reflux disease without esophagitis     Obstruction of common bile duct     Overflow diarrhea     History of partial colectomy     Complex regional pain syndrome     Solitary left kidney     Flank pain     Hypocitraturia     Low urine output     Primary osteoarthritis of both knees     Iron deficiency anemia     Proteinuria     S/p nephrectomy       Family History       family history includes Aortic aneurysm in her mother; Heart disease in her father and mother; Kidney disease in her father and mother; Stroke in her father.    Social History      reports that she quit smoking about 20 years ago. She has a 20.00 pack-year smoking history. She has never used smokeless tobacco. She reports that she does not drink alcohol or use drugs.      Review of Systems     Patient denies current pain, limited mobility, fractures.   Remainder per HPI.      Vital Signs     There were no vitals taken for this visit.        Assessment     1. Osteopenia of multiple sites    2. Post-menopausal        Plan     I would like to get a \"change of Therapy\" Dexa Scan to see how her BMD is right now. We can approach the use of Forteo again if need be. Ideally, I would like to get the Prolia back for her, and this too may be an option. Further treatment will be based upon the Dexa findings. She will f/u with me in 1 year.         Current Medications     Outpatient Medications Prior to Visit   Medication Sig Dispense Refill     calcium carb/vitamin D3/vit K1 (VIACTIV ORAL) Take 1 tablet by mouth daily.       coenzyme Q10 (CO Q-10) 100 mg " capsule Take 1 capsule (100 mg total) by mouth daily. 30 capsule 3     enoxaparin ANTICOAGULANT (LOVENOX) 60 mg/0.6 mL syringe 0.6 mL (60 mg total) by abdominal subcutaneous route daily. 10 Syringe 0     evolocumab 140 mg/mL PnIj Inject 140 mg under the skin every 14 (fourteen) days. (Patient taking differently: Inject 140 mg under the skin every 14 (fourteen) days. Repatha) 6 mL 3     fentaNYL (DURAGESIC) 50 mcg/hr Place 1 patch on the skin every other day. 15 patch 0     fentaNYL (DURAGESIC) 75 mcg/hr Place 1 patch on the skin every other day. 15 patch 0     lamoTRIgine (LAMICTAL) 100 MG tablet Take 2 tablets (200 mg total) by mouth 2 (two) times a day. two twice a day 360 tablet 2     levothyroxine (LEVO-T) 50 MCG tablet Take 1 tablet (50 mcg total) by mouth Daily at 6:00 am. 90 tablet 3     lisinopriL (PRINIVIL,ZESTRIL) 20 MG tablet Take 1 tablet (20 mg total) by mouth daily. 90 tablet 3     naloxone (NARCAN) 4 mg/actuation nasal spray 1 spray (4 mg dose) into one nostril for opioid reversal. Call 911. May repeat if no response in 3 minutes. 1 Box 0     rosuvastatin (CRESTOR) 40 MG tablet Take 1 tablet (40 mg total) by mouth at bedtime. 90 tablet 3     sodium phosphates 133 mL (FLEET ENEMA) 19-7 gram/118 mL Enem rectal enema as needed.        SUMAtriptan (IMITREX) 50 MG tablet Take 1 tablet (50 mg total) by mouth every 2 (two) hours as needed for migraine. 9 tablet 5     traZODone (DESYREL) 100 MG tablet Take 4 tablets (400 mg total) by mouth at bedtime. TAKE  4 TABLETS(400 MG) BY MOUTH AT BEDTIME AS NEEDED FOR SLEEP (Patient taking differently: Take 400 mg by mouth at bedtime. TAKE  4 TABLETS(400 MG) BY MOUTH AT BEDTIME AS NEEDED FOR SLEEP  Patient is taking 3 tablets by mouth at HS) 360 tablet 1     valACYclovir (VALTREX) 1000 MG tablet TAKE ONE TABLET BY MOUTH THREE TIMES A DAY FOR 7 DAYS as needed for outbreaks 42 tablet 2     warfarin ANTICOAGULANT (COUMADIN) 2.5 MG tablet Take 1 tablet (2.5 mg total) by  mouth See Admin Instructions. Take 2.5 to 7.5 mg daily, adjusted for INR 90 tablet 3     warfarin ANTICOAGULANT (COUMADIN/JANTOVEN) 5 MG tablet Take one and one half to two tablets (7.5 to 10 mg) by mouth daily. Adjust dose based on INR results as directed. 160 tablet 1     zonisamide (ZONEGRAN) 25 MG capsule Take 1 capsule (25 mg total) by mouth 3 (three) times a day. 270 capsule 2     Facility-Administered Medications Prior to Visit   Medication Dose Route Frequency Provider Last Rate Last Admin     teriparatide injection 20 mcg (FORTEO)  20 mcg Subcutaneous DAILY Caroline Sumner, NOMAN             Lab Results     TSH   Date Value Ref Range Status   06/25/2020 0.61 0.30 - 5.00 uIU/mL Final     PTH   Date Value Ref Range Status   08/12/2020 92 (H) 10 - 86 pg/mL Final     Calcium   Date Value Ref Range Status   12/04/2020 9.0 8.5 - 10.5 mg/dL Final     Phosphorus   Date Value Ref Range Status   10/12/2017 3.2 2.5 - 4.5 mg/dL Final     Iron   Date Value Ref Range Status   04/06/2020 73 42 - 175 ug/dL Final           Imaging Results   Last DEXA scan:  Results for orders placed in visit on 01/04/19   DXA Bone Density Scan    Narrative 1/15/2019      RE: Sandy Spencer  YOB: 1940        Dear Caitlyn Rees,    Patient Profile:  78 y.o. female, postmenopausal, is here for the follow up bone density   test.   History of fractures - Yes; Both feet. Family history of osteoporosis -   Yes;  mother and sibling.  Family history of hip fracture: None. Smoking   history - Past. Osteoporosis treatment past -  Yes;  HRT and   Bisphosphonates. Osteoporosis treatment current - Yes;  Prolia (On 1   year).  Chronic medical problems - Chronic low back problems,   Hysterectomy, Oophorectomy, Premature menopause and Spine surgery. High   risk medications -  Thyroid;  Yes, Currently.      Assessment:    1. The spine bone density L1-L2 with T-score 1.0.  Degenerative changes   are noted which artifactually elevate  the bone mineral density.  2. Femoral bone densities show left femoral neck T- score -1.6 and right   femoral neck T-score -1.5.  3. Trabecular bone score indicates good trabecular bone architecture.  4.  Left one third radius T score -2.3.    78 y.o. female with LOW BONE DENSITY (OSTEOPENIA) currently receiving   treatment with Prolia therapy.    Since the previous bone density dated  October 25, 2017, there has been no   statistically significant  change in the bone density of the spine.    Additionally there has been a 4.3 %  in the left total hip and no   statistically significant % change in the right total hip.  Stability   and/or an increase in bone mineral density is considered a positive   response to therapy.    Recommendations:  Continuation of current treatment is recommended with follow up bone   density scan in 2 years.      Bone densitometry was performed on your patient using our Perfect [employer]   densitometer. The results are summarized and a copy of the actual scans   are included for your review. In conformity with the International Society   of Clinical Densitometry's most recent position statement for DXA   interpretation (2015), the diagnosis will be made on the lowest measured   T-score of the lumbar spine, femoral neck, total proximal femur or 33%   radius. Note the change in terminology for diagnostic classification from   OSTEOPENIA to LOW BONE MASS. All trending for sequential exams will be   done using multiple vertebrae or the total proximal femur. Fracture risk   is based on the WHO Fracture Risk Assessment Tool (FRAX). If additional   information is needed or if you would like to discuss the results, please   do not hesitate to call me.       Thank you for referring this patient to [facility].   We are happy to be of service in support of you and your practice. If you   have any questions or suggestions to improve our service, please call me   at [phone].      Sincerely,     Kimberly Lopez M.D. CJoryCLATOSHA.  Osteoporosis Services, [facility]             Phone call duration: 19 minutes    Cas LOPEZ
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
## 2021-06-13 NOTE — PROGRESS NOTES
Medication Therapy Management - [facility]       ASSESSMENT AND PLAN    Chronic Pain Syndrome:   Uncontrolled. Dr. Lynn's plan of care from this morning was re-iterated.  We discussed rationale for treatment choices, particularly related to her high propensity for bowel side effects. She expressed understanding. We discussed rationale for her withdrawal medication (clonidine and tizanidine). She feels that her overnight sweating and GI upset may be related to withdrawal, which is possible, but relatively unlikely this far out. However, it would be reasonable to try clonidine or tizanidine 1 dose prior to bedtime to see if this provides relief. She will not take her clonidine if her BP is < 120/70. No additional recommendations to be given today due to time constraints.     FOLLOW-UP PLAN  Sandy was advised to follow up in 3 months        SUBJECTIVE AND OBJECTIVE  Sandy Spencer is a 77 y.o. female who was referred by Felicia Damon CNP for MT services.  Sandy's chief concern today is medication management and medication questions.  Pertinent commodities: CKD and bowel resections resulting in impaired medication absorption, depression, ALIYAH.     We were not able to assess all pain complaints/medications today as Sandy has many concerns related to the plan of care from Dr. Lynn's visit earlier today. She feels that the medications are appropriate, but she is highly worried about the cost and availability of patches from the pharmacy. She prefers the fentanyl patches over the morphine, but has difficulty with overnight sweating and patches falling off. She is frustrated and tearful throughout the visit and feels that she won't be able to afford medical care much longer.     Recent hospital discharge s/p bowel impaction. She has significantly reduced her morphine and went through withdrawal due to lost meds/mix up. Fentanyl makes her feel less anxious, however, she doesn't feel that  "it is working. She doesn't feel that the ketamine is assistive, \"costs so much\", and she is worried that the dose hasn't been increased. She describes night sweats that have been going on for \"a long time\". Wakes up every night 3 hrs after falling asleep, bed is sopping wet, worse after leaving the hospital. Describes feeling \"on edge\" and isn't sure her withdrawal has completed.       Pain Diagnosis: Chronic pain secondary to lumbar radiculopathy, lumbar stenosis, HA, RSD. Bilateral laminectomy in August that was not successful.    Location/Description: constant, gnawing, burning, sharp. Crushing, pins and needles. Big toes numb    Pain Score: 6    Functional Score: 8    Previously tried pain medications and reactions:    NSAIDS: Not assessed    Opioids: Hydrocodone/APAP - didn't work, hydromorphone - helped for short, Butrans - never tried. oxycodone - constipation, tapentadol - not covered by insurance, morphine - GI upset    Antidepressants: Cymbalta - throat closes, amitriptyline, nortriptyline, doxepin- tried them, but can't remember    Gabapentinoids: Gabapentin - didn't help, Lyrica - throat closes    Muscle Relaxants Baclofen - helpful, methocarbamol - helpful. Scared     Topicals: Capsaicin - not helpful, lidoderm patches - not helpful    Amantadine: Unknown    Non-pharmacological treatments:     Physical Therapy: currently undergoing    Acupuncture: receives for cluster headaches    Universal Precautions:   UDS/Swab- 06/06/2016   Consent-   Agreement- 04/14/2017  Pharmacy- as documented   - 08/28/2017 Reviewed, ok  Count- n/a  Psychological evaluation n/a  Pharmacogenetic testing- n/a      Social History     Social History     Marital status:      Spouse name: N/A     Number of children: N/A     Years of education: N/A     Occupational History     Not on file.     Social History Main Topics     Smoking status: Former Smoker     Packs/day: 1.00     Years: 20.00     Quit date: 1/1/2000     " Smokeless tobacco: Never Used     Alcohol use No     Drug use: No      Comment: uses medical marijuana     Sexual activity: No     Other Topics Concern     Not on file     Social History Narrative       Current Outpatient Prescriptions   Medication Sig Dispense Refill     aspirin 81 MG EC tablet Take 81 mg by mouth daily.       atorvastatin (LIPITOR) 40 MG tablet Take 1 tablet (40 mg total) by mouth at bedtime. 90 tablet 3     azelastine (ASTEPRO) 0.15 % (205.5 mcg) Spry nasal spray 1 spray by Left Nare route 2 (two) times a day as needed.       busPIRone (BUSPAR) 15 MG tablet Take 1 tablet (15 mg total) by mouth 2 (two) times a day. 180 tablet 1     cholecalciferol, vitamin D3, 5,000 unit Tab Take 1 tablet by mouth daily.       cloNIDine HCl (CATAPRES) 0.1 MG tablet TAKE 1 TABLET BY MOUTH TWICE DAILY 28 tablet 0     diphenhydrAMINE (BENADRYL) 25 mg capsule Take 25 mg by mouth every 6 (six) hours as needed.        fentaNYL (DURAGESIC) 12 mcg/hr Place 1 patch on the skin every other day. 15 patch 0     [START ON 11/6/2017] fentaNYL (DURAGESIC) 25 mcg/hr Place 1 patch on the skin every third day. 10 patch 0     furosemide (LASIX) 40 MG tablet Take 0.5-1 tablets (20-40 mg total) by mouth daily as needed. 90 tablet 1     Lactobacillus rhamnosus GG (CULTURELLE) 15 billion cell CpSP Take 1 capsule by mouth daily.       levothyroxine (SYNTHROID, LEVOTHROID) 50 MCG tablet Take 1 tablet (50 mcg total) by mouth daily. 90 tablet 1     losartan (COZAAR) 25 MG tablet Take 1 tablet (25 mg total) by mouth 2 (two) times a day. 180 tablet 1     methylPREDNISolone (MEDROL DOSEPACK) 4 mg tablet follow package directions 21 tablet 0     OMEGA-3/DHA/EPA/FISH OIL (FISH OIL-OMEGA-3 FATTY ACIDS) 300-1,000 mg capsule Take 1.2 g by mouth 2 (two) times a day.       omeprazole (PRILOSEC) 20 MG capsule Take 1 capsule (20 mg total) by mouth daily. 90 capsule 1     ondansetron (ZOFRAN-ODT) 4 MG disintegrating tablet Take 1 tablet (4 mg total) by  mouth every 8 (eight) hours as needed for nausea. 15 tablet 3     psyllium (METAMUCIL) 3.4 gram packet Take 1 packet by mouth 2 (two) times a day.  0     SUMAtriptan (IMITREX) 50 MG tablet Take 1 tablet (50 mg total) by mouth every 2 (two) hours as needed for migraine. 27 tablet 0     tiZANidine (ZANAFLEX) 4 MG tablet Take 1 tablet (4 mg total) by mouth every 6 (six) hours as needed. 30 tablet 0     traZODone (DESYREL) 100 MG tablet Take 2-4 tablets (200-400 mg total) by mouth at bedtime. 360 tablet 1     verapamil (CALAN-SR) 240 MG CR tablet Take 1 tablet (240 mg total) by mouth bedtime. 90 tablet 1     No current facility-administered medications for this encounter.        We reviewed Sandy's medication list with them, discussing reason for use, directions for use, and potential side effects of each medication.  Indication, safety, efficacy, and convenience was assessed for all medications.     Sandy was provided with a printed AVS, and this care plan was communicated via EMR with Felicia Damon CNP and is the authorizing prescriber for this visit.  Direct supervision was available by either the referring provider or another available physician when needed.    This note has been dictated using voice recognition software. Any grammatical or context distortions are unintentional and inherent to the software.       Time spent: 45 minutes    Arabella Quezada, PharmD  MTM Pharmacist at [facility]

## 2021-06-13 NOTE — PROGRESS NOTES
Subjective:   Sandy Spencer is a 77 y.o. female who presents for evaluation of pain. Patient was last seen 09/08/17.      Major issues:  1. Chronic pain syndrome    2. Reflex sympathetic dystrophy    3. Lumbar radiculopathy        CC: Pain  See rooming evaluation    HPI:   Impact of pain treatments:   Analgesia: poor   ADL's: Work: she is on disability. Household: she is not able to participate in chores. Social: She lives alone, she is not able to participate in social events.    AE's: denies   Aberrant behavior: denies    Aggravating factors: the weather, walking, sitting, anything  Alleviating factors: medications, PT  Associated symptoms: denies recent ER visits of falls  DME: none  Location/Laterality of the pain: back pain, knee pain, neck pain  Timing: constant  Quality: deep, ache, cold, crushing  Severity:7/10 last OV 6/10    Activities Impaired by Increasing Pain Severity: F= 8  3-Enjoy  4-Work, Enjoy  5-Active, Mood Work Enjoy  6-Sleep, Active, Mood Work Enjoy  7-Walk, Sleep, Active, Mood Work Enjoy  8-Relate, Walk, Sleep, Active, Mood Work Enjoy      Medication: Patient is taking Morphine 30 mg 2-3 times a day, Sumatriptan 50 mg tablet every 2 hours as needed for pain,  gabapentin 300 mg 3 times a day, trazodone 100 mg take 2-4 tablets at night, ketamine spray. .     Last opioid dose was Morphine last dose 9/25/17 @1200.       Interventional: She has had injections with Dr. Mittal and he reports that it has been some how effective for her pain.    Rehabilitation: She continues to attend PT at [facility]    Integrative Approaches: Stay active, continues with home stretches.    MTM: She seen MTM pharmacist and she has another appointment scheduled.        Records: Reviewed to prepare for todays visits and reflected throughout the note.    Diagnostics:   Lab:  No labs ordered today.    Review of Systems   Constitutional- + sleep disturbances, + activity intolerance  Respiratory: denies  "SOB  Musculoskeletal- + pain  Neuro- - cognitive changes, + radicular, + neuropathic symptoms  GI/-  - constipation, - urinary difficulty  Psych-  + mood disorders,  + taking medication in a fashion other than prescribed    Objective:     Vitals:    09/25/17 1512   BP: 143/77   Pulse: 70   Resp: 16   Weight: 160 lb (72.6 kg)   Height: 5' 3.5\" (1.613 m)   PainSc:   7       Constitutional:  Pleasant and cooperative female who presents alone today.   Psychiatric: Mood and affect are appropriate for the situation, setting and topic of discussion.  Patient does not appear sedated.  Integumentary:  Observed skin WNL  HEENT: EOM's grossly intact.    Respiratory:  No signs of distress noted, breathing is non-labored.   Musculoskeletal:  +pain  Neurological:  Alert and oriented in all spheres including: time, place, person and situation.  Durable Medical Equipment: none    Assessment:   Sandy Spencer is a 77 y.o. female seen in clinic today for a follow-up of lower extremity chronic regional pain syndrome of the right leg, chronic headaches, abdominal pain with recurrent pancreatitis.  Today she reports that she has her legs swelling, lymphedema is bad, started on new medications 3 different pills.  She is anxious and emotional because she cannot sleep, she is required to use CPAP and she says she is unable to sleep due to the noise.  She started using Ketamine spray and she reports that she has been using that but it is not effective for her pain.  She reports that she has a difficult time walking and she says her functional level has decreased dramatically.  She reports her pain as deep crushing, aching and burning and the pain is constant.      She reports that she will be moving to a new apartment in [place] February 18th 2018.  This is one floor and she does not need to go up the stairs.  She is hoping this will make her pain less intense.  She is asking to have a letter written for her building so that she " can have mail delivered to the front door of her apartment.      She is asking to have her medications changed to fentanyl.  She reports that previously she was using up to 100 mcg/hr every 3 days.  She reports that she has been weaning off her morphine and currently she is taking 2-3 tabs a day.  I counted her and she has 268 tablets.  She reports that some of this medications are from previous prescriptions that she never used.  She is asking for me to dispose of the medications.  I have discussed with her that she can wean off morphine and will start Fentanyl 25 mcg/hr, she is aware that fentanyl will be titrated gradually.  She says she want to eliminate any medications that goes through the colon.  We discussed about side effects and I answered her questions.  I am not able to take part of her morphine because I need her to wean off.  I have given her information about the locations where she can drop off her medications.        Plan:   Plan/NextSteps:   A letter was written for her mails to be delivered to her door.   Wean off morphine, take 1 tab a day X 7 days.  Start Fentanyl patch @ 25 mcg/hr   A letter for her mails to be delivered to her apartment was given    Medication:   Medication prescribed today Fentanyl 25 mcg/hr    REFILL INSTRUCTIONS:  Please contact the clinic refill line 7 days before your refill is due. Speak clearly; note cell phones cut in-and-out and poor quality speech and reception issues will influence our ability to hear you and be efficient with your prescription.     Call [phone] leave:   Your name (first and last w/ spelling)   Date of birth  Name of all the medication(s) being requested  Dose of the medication(s)   How you are taking the medication (eg. twice per day etc).     Contact your pharmacy 3 (three) days after leaving your message to see if your prescription has been received. Please request the pharmacy check your profile to be certain about any concerns with a  script failing to be received. Note: Ashley updates have been inconsistent.  If the script has not been received there may have been a problem with the communication please reach back out to the clinic.       Interventional: He has had injections with Dr. Mittal and she reports that it has been assistive for pain.    Rehabilitation: Continue with PT as recommended    Integrative Approaches: Stay active    MTM:  Follow up as recommended     Mental Health: She has an appointment scheduled with NANCY Bell pharmacist 12/12/17     Health Maintenance: per primary care    Records: Reviewed to assist with preparation for the office visit and are reflected throughout the note.    Follow up: 3 weeks      Education: Please call Monday-Friday for problems or questions and one of the clinical support staff (CSS) will help to get things figured out. The number is [phone]. Some folks are using [employer] to send and e-mail. Please remember some issues require an office visit.     Reviewed the plan of care, provided justification and answered questions with the patient.     SAFETY REMINDERS  No alcohol while taking controlled substances. Alcohol is not an illegal substance, it is unsafe to use in combination. It is a build up of substances in the body that can be extremely hazardous and may cause respirations to slow to a dangerous rate resulting in hospitalization, brain damage, or death.    Opioid medications have been associated with sharp rise in unintentional overdose and death.  Overdose is a condition characterized by the consumption in excess of a particular drug causing adverse effects. Symptoms of overdose include: breathing slow and shallow, erratic or not at all, pinpoint pupils, hallucinations, confusion, muscle jerks, slack muscles, extreme sleepiness or loss of alertness, awake but not able to talk, face pale or clammy, vomiting, for lighter skinned people, the skin tone turns bluish purple, for darker skinned  people, it turns grayish or ashen. If in a situation where overdose is a concern engage the emergency response system (dial 911).    Do not sell, loan, borrow or share your opioid medication with anyone. Deaths have occurred as a result of this practice. It is illegal and patients are being prosecuted.       *Universal Precautions:   UDS/Swab- 06/06/2016   Consent-   Agreement- 04/14/2017  Pharmacy- as documented   - 09/25/2017 Reviewed, ok  Count- 268 Morphine 30 mg  Psychological evaluation n/a  Pharmacogenetic testing- n/a  MME- 150    Management of opioid medication is inherently a moderate to high immedial interaction based on the risk management required at each contact r/t risks and side effects.      TT: 40 - min  CT: over half spent in education and counseling as outlined in the plan.      Stacie MAIER CNP-C  [address] [place] [address]  [facility]  [phone]  [phone]

## 2021-06-13 NOTE — PATIENT INSTRUCTIONS - HE
Patient Stated Goal: Prevent further stones  INCREASING FLUIDS TO DECREASE RISK    Low Urinary Volume:     Kidney stones form because there is not enough water to dissolve the concentrated chemicals and minerals in urine.     Studies have found that people who make kidney stones should drink enough fluids so that they produce at least 2 liters (2 quarts) of urine per day.     Studies have shown that virtually every fluid you might drink decreases the risk of stone formation. Acceptable fluids include milk, coffee, diet and regular sodas, alcoholic beverages, fruit juices and even plain old water.    Increasing fluid intake will increase urine volume and decrease the chance of kidney stone formation.    Fluids:    Anything liquid that fits in a glass counts.     One way to gauge if your body has enough fluids is to check the color of your urine. If the urine is dark and yellow you do not have enough fluids. Urine will be pale yellow to clear if your body has enough fluids.    What we need to survive:    Most fluid we drink is lost through evaporation, more if active in hot humid environments    Body wastes can be cleared in a small amount of urine    All fluid that is consumed in excess of necessary losses  dilutes the urine    Ways to Increase Fluids Daily:    Drink more fluids throughout the day and into the evening. (You may need to awake from sleep to urinate which is a good indication of volume status)    Keep your refrigerator stocked with beverages you like    Drink a variety of fluids - mix it up    Add another beverage to your regular beverage at every meal    Keep a beverage at your desk (work area) and finish it before you leave for breaks, meetings, lunch and end of day    Use larger volume glasses    Whenever you pass a water fountain - stop and drink    Drink a beverage with snacks, if it is a salty snack, double the beverage    Take medication with a full glass of water/fluid    Keep a bottle of  water in your car and drink every 20 miles    Eat high water content fruits (melons, oranges, grapes, etc.)    Flavor water with a squeeze of lemon or lime or Crystal Light     If you do something repetitive throughout the day, link it with having something to drink    When you give your child/children something to drink, you have something to drink also    [facility] can respond to your questions or concerns 24 hours a day at [phone].

## 2021-06-13 NOTE — TELEPHONE ENCOUNTER
"Patient on [facility] lab schedule 12/11/20 at 11:30am for \"INR and labs per Allie Sumner. No [place] labs in chart  "

## 2021-06-13 NOTE — TELEPHONE ENCOUNTER
FYI - Status Update  Who is Calling: Patient  Update: Wanting to speak to MIRIAM Adams nurse.   Okay to leave a detailed message?:  Yes
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
## 2021-06-13 NOTE — TELEPHONE ENCOUNTER
Informed patient these are typically not a direct symptoms. See other encounter. INR to be obtained today.     Aye Rice RN

## 2021-06-13 NOTE — TELEPHONE ENCOUNTER
unfortunately if she is feeling dizzy and light headed she should go to the ER for evaluation, they can get labs in faster. I'm at the hospital today and am not able to see her in clinic.

## 2021-06-13 NOTE — TELEPHONE ENCOUNTER
Pt is requesting a Covid test.  State she has had no exposures but feels due to 'residual effects' she would like to check it.       Please call her with further questions and/or if she can do this or not.

## 2021-06-13 NOTE — PATIENT INSTRUCTIONS - HE
PLAN:  Wendy the use of a geniculate block in your knee, as a different approach to help with knee pain.    Increase the zonisamide (Zonegran) to 25 mg 3 times a day to see if this helps with headache pain.  Graph your appointment with your neurologist in a couple of weeks to help with injections for your headaches.    Continue with the lamotrigine 100 mg tablets 2 tablets twice a day.    Discussed the use of coenzyme Q 10 as you were taking a statin, which may help with leg pain.  Sent as a prescription for 100 mg daily.    Continue with the fentanyl 75 mcg every 48 hours.    Follow-up with Dr. Lynn 8 to 12 weeks.

## 2021-06-13 NOTE — PROGRESS NOTES
ASSESSMENT/PLAN:       1. Essential hypertension    - lisinopriL (PRINIVIL,ZESTRIL) 20 MG tablet; Take 1 tablet (20 mg total) by mouth daily.  Dispense: 90 tablet; Refill: 3  I think it is appropriate for the patient to increase her lisinopril to 20 mg daily with a goal blood pressure below 130 systolic and below 80 diastolic.  Patient is aware that she needs to avoid nonsteroidal anti-inflammatory medications  I note that Dr. Rees has sent a letter to the patient with the results on her basic metabolic panel.  Creatinine 1.49 and GFR 34 which is stable.  Monitor blood pressure out of office as able    2. Occipital headache    - Ambulatory referral to Neurology  The type of a headache she is having right now may well respond to a occipital nerve block  Will have the patient try to get into Dr. Morocho  No other changes in medication    3. Chronic kidney disease (CKD) stage G3a/A1, moderately decreased glomerular filtration rate (GFR) between 45-59 mL/min/1.73 square meter and albuminuria creatinine ratio less than 30 mg/g    - Basic Metabolic Panel, reviewed    Patient had an INR today as well which was 1.9 with appropriate adjustments to her warfarin.      Adryan William MD      PROGRESS NOTE   11/19/2020    SUBJECTIVE:  Sandy Spencer is a 80 y.o. female  who presents for   Chief Complaint   Patient presents with     Follow-up     1 month f/u - tests for kidney, results     INR Check     Headache     The patient is seen today for a follow-up in regard to a recent nephrology consultation.  Lisinopril was started and here to check blood pressure, recheck renal function and make decision on appropriate dose for the lisinopril.  The patient also wanted to talk about her headaches.    1. Essential hypertension  The patient had been taking lisinopril 10 mg daily and recently increased to 20 mg daily.  The patient would like a 90-day supply on her lisinopril.  The patient's blood pressure is still elevated.   She did take her lisinopril today.  Patient denies any chest pain or shortness of breath.  She is not had a new cough since starting lisinopril.    2. Occipital headache  The patient has been seen by Dr. Morocho at [facility].  The patient is having some difficulties with occipital headache on the left side that radiates up to the area behind her left eye.  The neurology clinic had done a greater occipital nerve block which seem to be quite effective and she wonders if she may need to have another 1 of those.  The patient does have a prescription for sumatriptan for migraine headaches.    3. Chronic kidney disease (CKD) stage G3a/A1, moderately decreased glomerular filtration rate (GFR) between 45-59 mL/min/1.73 square meter and albuminuria creatinine ratio less than 30 mg/g  Nephrology consultation was reviewed and additional testing all came back looking fairly normal.  We will need to monitor electrolytes and kidney function to make sure no worsening of electrolytes and kidney function with increasing lisinopril.  It hopefully will actually improve her kidney function.    Patient Active Problem List   Diagnosis     Chronic kidney disease (CKD) stage G3a/A1, moderately decreased glomerular filtration rate (GFR) between 45-59 mL/min/1.73 square meter and albuminuria creatinine ratio less than 30 mg/g     Focal glomerular sclerosis     RSD lower limb, bilateral     ADD (attention deficit disorder)     RSD upper limb, right     Osteopenia     Major depression     Hypertension     Insomnia     Mixed hyperlipidemia     Right rotator cuff tear     Cluster headaches     Lumbar radiculopathy     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     Temporomandibular joint-pain-dysfunction syndrome (TMJ)     Localized swelling of lower leg     Acquired hypothyroidism     Chronic pain syndrome     Snoring     Abdominal pain, generalized     Dermatochalasis of left eyelid     Bilateral carotid artery stenosis      Coronary artery disease involving native coronary artery of native heart without angina pectoris     Sinus bradycardia     Other chronic pulmonary embolism without acute cor pulmonale (H)     Splenic infarction     Opioid type dependence, continuous (H)     Hematuria     Hydronephrosis     Bladder spasms     Anxiety disorder due to medical condition     Family relationship problem     History of posttraumatic stress disorder (PTSD)     Generalized muscle weakness     Myofascial pain     Moderate major depression (H)     Elevated lipase     Abdominal bloating     Acquired absence of other specified parts of digestive tract     Ampullary stenosis     Obstruction of biliary tree     Anemia     Aphthous ulcer of ileum     Bipolar disorder, unspecified (H)     Disorder of phosphorus metabolism     Edema     Gastroesophageal reflux disease without esophagitis     Obstruction of common bile duct     Overflow diarrhea     History of partial colectomy     Complex regional pain syndrome     Solitary left kidney     Flank pain     Hypocitraturia     Low urine output       Current Outpatient Medications   Medication Sig Dispense Refill     calcium carb/vitamin D3/vit K1 (VIACTIV ORAL) Take 1 tablet by mouth daily.       enoxaparin ANTICOAGULANT (LOVENOX) 60 mg/0.6 mL syringe INJECT 0.6ML UNDER SKIN EVERY 12 HOURS       evolocumab 140 mg/mL PnIj Inject 140 mg under the skin every 14 (fourteen) days. 6 mL 3     fentaNYL (DURAGESIC) 25 mcg/hr Place 1 patch on the skin every other day. (Patient taking differently: Place 1 patch on the skin every other day. 75 mg) 15 patch 0     fentaNYL (DURAGESIC) 50 mcg/hr Place 1 patch on the skin every other day. 15 patch 0     levothyroxine (LEVO-T) 50 MCG tablet Take 1 tablet (50 mcg total) by mouth Daily at 6:00 am. 90 tablet 3     naloxone (NARCAN) 4 mg/actuation nasal spray 1 spray (4 mg dose) into one nostril for opioid reversal. Call 911. May repeat if no response in 3 minutes. 1 Box 0      rosuvastatin (CRESTOR) 40 MG tablet Take 1 tablet (40 mg total) by mouth at bedtime. 90 tablet 3     sodium phosphates 133 mL (FLEET ENEMA) 19-7 gram/118 mL Enem rectal enema as needed.        SUMAtriptan (IMITREX) 50 MG tablet Take 1 tablet (50 mg total) by mouth every 2 (two) hours as needed for migraine. 9 tablet 5     traZODone (DESYREL) 100 MG tablet Take 4 tablets (400 mg total) by mouth at bedtime. TAKE  4 TABLETS(400 MG) BY MOUTH AT BEDTIME AS NEEDED FOR SLEEP 360 tablet 1     valACYclovir (VALTREX) 1000 MG tablet TAKE ONE TABLET BY MOUTH THREE TIMES A DAY FOR 7 DAYS as needed for outbreaks 42 tablet 2     warfarin ANTICOAGULANT (COUMADIN) 2.5 MG tablet Take 1 tablet (2.5 mg total) by mouth See Admin Instructions. Take 2.5 to 7.5 mg daily, adjusted for INR 90 tablet 3     warfarin ANTICOAGULANT (COUMADIN/JANTOVEN) 5 MG tablet Take one to two tablets (5 to 10 mg) by mouth daily. Adjust dose based on INR results as directed. 180 tablet 3     zonisamide (ZONEGRAN) 25 MG capsule Take 1 capsule (25 mg total) by mouth daily. 60 capsule 2     lamoTRIgine (LAMICTAL) 100 MG tablet Two morning, one and one-half bedtime for one week, then two twice a day 120 tablet 2     lisinopriL (PRINIVIL,ZESTRIL) 20 MG tablet Take 1 tablet (20 mg total) by mouth daily. 90 tablet 3     Current Facility-Administered Medications   Medication Dose Route Frequency Provider Last Rate Last Admin     teriparatide injection 20 mcg (FORTEO)  20 mcg Subcutaneous DAILY Caroline Sumner, NP           Social History     Tobacco Use   Smoking Status Former Smoker     Packs/day: 1.00     Years: 20.00     Pack years: 20.00     Quit date: 2000     Years since quittin.8   Smokeless Tobacco Never Used           OBJECTIVE:        Recent Results (from the past 240 hour(s))   Basic Metabolic Panel   Result Value Ref Range    Sodium 144 136 - 145 mmol/L    Potassium 4.2 3.5 - 5.0 mmol/L    Chloride 107 98 - 107 mmol/L    CO2 24 22 - 31 mmol/L  "   Anion Gap, Calculation 13 5 - 18 mmol/L    Glucose 90 70 - 125 mg/dL    Calcium 9.1 8.5 - 10.5 mg/dL    BUN 25 8 - 28 mg/dL    Creatinine 1.49 (H) 0.60 - 1.10 mg/dL    GFR MDRD Af Amer 41 (L) >60 mL/min/1.73m2    GFR MDRD Non Af Amer 34 (L) >60 mL/min/1.73m2   INR   Result Value Ref Range    INR 1.90 (H) 0.90 - 1.10       Vitals:    11/17/20 1532   BP: 150/80   Pulse: 87   Resp: 18   Temp: 99.2  F (37.3  C)   SpO2: 98%   Weight: 140 lb 5 oz (63.6 kg)   Height: 5' 2\" (1.575 m)     Weight: 140 lb 5 oz (63.6 kg)          Physical Exam:  GENERAL APPEARANCE: Pleasant 80-year-old female who appears younger than her stated age, NAD, well hydrated, well nourished  SKIN:  Normal skin turgor, no lesions/rashes   HEENT: moist mucous membranes, no rhinorrhea, the patient has some tenderness over the left occiput in the distribution of the greater occipital nerve.  NECK: Normal without adenopathy or masses  CV: RRR, no M/G/R   LUNGS: CTAB     EXTREMITY: no edema and full ROM of all joints  NEURO: no focal findings    "

## 2021-06-13 NOTE — TELEPHONE ENCOUNTER
It could be related to her prior covid infection. THere is a post covid clinic opening up through [place], we could look at a referral there if she wanted.     Without new symptoms I don't think it is helpful to retest for covid. She is seeing me next week, we can certainly look at talking about this more then.

## 2021-06-13 NOTE — TELEPHONE ENCOUNTER
Offered Lovenox to patient. She agreed to taking the shots, explained this is in relation to her history, and recent subtherapeutic INRs. Will have [facility] pharmacist confirm Lovenox proper dosing prior to sending medication to preferred pharmacy. Patient also requested a refill of warfarin 5 mg tablets. Sent to preferred pharmacy.     Aye Rice RN

## 2021-06-13 NOTE — TELEPHONE ENCOUNTER
HA's are worse than usual, fell off a ladder two days ago. Small step ladder. Can't write well and Thinking/memory is off for more than one month  She thinks perhaps after her last office visit. Could this be side effects from her COVID virus in May 2020? Kids are concerned also.

## 2021-06-13 NOTE — PROGRESS NOTES
ASSESSMENT/PLAN:       1. Acquired hypothyroidism  -Doing well at this time, will update labs and update prescription as well. Adjust medication as needed  - levothyroxine (SYNTHROID, LEVOTHROID) 100 MCG tablet; Take 0.5 tablets (50 mcg total) by mouth daily.  Dispense: 45 tablet; Refill: 3  - T4, Free  - Thyroid Stimulating Hormone (TSH)    2. Herpes zoster with complication  -Doing well at this time, will provide refill today for her to have on hand if her symptoms recur  - valACYclovir (VALTREX) 1000 MG tablet; TAKE ONE TABLET BY MOUTH THREE TIMES A DAY FOR 7 DAYS as needed for outbreaks  Dispense: 42 tablet; Refill: 2    3. Nausea  -Stable at this time, will update prescription for zofran to have on hand.   - ondansetron (ZOFRAN) 4 MG tablet; Take 1 tablet (4 mg total) by mouth every 8 (eight) hours as needed for nausea.  Dispense: 30 tablet; Refill: 2    4. Tingling in extremities  -will update B12, likely related to her known back issues  - Vitamin B12    5. Memory loss  -Will update labs today, likely secondary to her COVID infection, but has had some reported memory issues prior to that as well. May need to consider neuropsych testing as well.   - Comprehensive Metabolic Panel  - HM2(CBC w/o Differential)    6. History of 2019 novel coronavirus disease (COVID-19)  -Given her complications will refer to post covid clinic  - ADULT POST COVID CLINIC REFERRAL; Future    7. Occipital headache  -Increased headaches, will provide prednisone for treatment at this time.   - predniSONE (DELTASONE) 10 mg tablet; Take 4 tabs daily for 5 days, then 3 tabs daily for 5 days, then 2 tabs daily for 5 days, then 1 tab daily for 5 days, then stop..  Dispense: 50 tablet; Refill: 0    8. Other chronic pulmonary embolism without acute cor pulmonale (H)  - INR    9. History of blood clots  - INR      Return in about 3 weeks (around 1/8/2021).      Caitlyn Rees MD      PROGRESS NOTE   12/18/2020    SUBJECTIVE:  Sandy  ELSIE Spencer is a 80 y.o. female  who presents for follow up.     She comes in today for follow up. She does think that she is having some issues with post-covid. She feels that her memory has gotten worse since COVID. She is very nauseated. She is not remembering well. She is not writing well either. Her equilibrium is off and she is not thinking right either. She misplaced her thyroid medication as well. She does wonder if she can get a higher dose and then split it with her cutter.     Increased headaches as well. She has been needing to take her Imitrex. She is having a lot of nausea as well.     She does have Shingles again. She did have a prescription of Valtrex around just in case this happened again. The shingles are drying up. She is not sleeping well either.     She does have some leg pain as well. It feels like electrical, in the arch on the right foot. It's like needles, only on the bottom. She does not have neuropathy.   Chief Complaint   Patient presents with     Follow-up     still having residual symptoms from COVID - joint pain, ear ache, foot feels like electricty is shooting out of it     INR Check         Patient Active Problem List   Diagnosis     Chronic kidney disease (CKD) stage G3a/A1, moderately decreased glomerular filtration rate (GFR) between 45-59 mL/min/1.73 square meter and albuminuria creatinine ratio less than 30 mg/g     Focal glomerular sclerosis     RSD lower limb, bilateral     ADD (attention deficit disorder)     RSD upper limb, right     Osteopenia     Major depression     Hypertension     Insomnia     Mixed hyperlipidemia     Right rotator cuff tear     Cluster headaches     Lumbar radiculopathy     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     Temporomandibular joint-pain-dysfunction syndrome (TMJ)     Localized swelling of lower leg     Acquired hypothyroidism     Chronic pain syndrome     Snoring     Abdominal pain, generalized     Dermatochalasis of left eyelid      Bilateral carotid artery stenosis     Coronary artery disease involving native coronary artery of native heart without angina pectoris     Sinus bradycardia     Other chronic pulmonary embolism without acute cor pulmonale (H)     Splenic infarction     Opioid type dependence, continuous (H)     Hematuria     Hydronephrosis     Bladder spasms     Anxiety disorder due to medical condition     Family relationship problem     History of posttraumatic stress disorder (PTSD)     Generalized muscle weakness     Myofascial pain     Moderate major depression (H)     Elevated lipase     Abdominal bloating     Acquired absence of other specified parts of digestive tract     Ampullary stenosis     Obstruction of biliary tree     Anemia     Aphthous ulcer of ileum     Bipolar disorder, unspecified (H)     Disorder of phosphorus metabolism     Edema     Gastroesophageal reflux disease without esophagitis     Obstruction of common bile duct     Overflow diarrhea     History of partial colectomy     Complex regional pain syndrome     Solitary left kidney     Flank pain     Hypocitraturia     Low urine output     Primary osteoarthritis of both knees     Iron deficiency anemia     Proteinuria     S/p nephrectomy       Current Outpatient Medications   Medication Sig Dispense Refill     coenzyme Q10 (CO Q-10) 100 mg capsule Take 1 capsule (100 mg total) by mouth daily. 30 capsule 3     enoxaparin ANTICOAGULANT (LOVENOX) 60 mg/0.6 mL syringe 0.6 mL (60 mg total) by abdominal subcutaneous route daily. 10 Syringe 0     evolocumab 140 mg/mL PnIj Inject 140 mg under the skin every 14 (fourteen) days. (Patient taking differently: Inject 140 mg under the skin every 14 (fourteen) days. Repatha) 6 mL 3     fentaNYL (DURAGESIC) 75 mcg/hr Place 1 patch on the skin every other day. 15 patch 0     lamoTRIgine (LAMICTAL) 100 MG tablet Take 2 tablets (200 mg total) by mouth 2 (two) times a day. two twice a day 360 tablet 2     levothyroxine (LEVO-T)  50 MCG tablet Take 1 tablet (50 mcg total) by mouth Daily at 6:00 am. 90 tablet 3     lisinopriL (PRINIVIL,ZESTRIL) 20 MG tablet Take 1 tablet (20 mg total) by mouth daily. 90 tablet 3     naloxone (NARCAN) 4 mg/actuation nasal spray 1 spray (4 mg dose) into one nostril for opioid reversal. Call 911. May repeat if no response in 3 minutes. 1 Box 0     rosuvastatin (CRESTOR) 40 MG tablet Take 1 tablet (40 mg total) by mouth at bedtime. 90 tablet 3     sodium phosphates 133 mL (FLEET ENEMA) 19-7 gram/118 mL Enem rectal enema as needed.        SUMAtriptan (IMITREX) 50 MG tablet Take 1 tablet (50 mg total) by mouth every 2 (two) hours as needed for migraine. 9 tablet 5     traZODone (DESYREL) 100 MG tablet Take 4 tablets (400 mg total) by mouth at bedtime. TAKE  4 TABLETS(400 MG) BY MOUTH AT BEDTIME AS NEEDED FOR SLEEP (Patient taking differently: Take 400 mg by mouth at bedtime. TAKE  4 TABLETS(400 MG) BY MOUTH AT BEDTIME AS NEEDED FOR SLEEP  Patient is taking 3 tablets by mouth at HS) 360 tablet 1     valACYclovir (VALTREX) 1000 MG tablet TAKE ONE TABLET BY MOUTH THREE TIMES A DAY FOR 7 DAYS as needed for outbreaks 42 tablet 2     warfarin ANTICOAGULANT (COUMADIN) 2.5 MG tablet Take 1 tablet (2.5 mg total) by mouth See Admin Instructions. Take 2.5 to 7.5 mg daily, adjusted for INR 90 tablet 3     warfarin ANTICOAGULANT (COUMADIN/JANTOVEN) 5 MG tablet Take one and one half to two tablets (7.5 to 10 mg) by mouth daily. Adjust dose based on INR results as directed. 160 tablet 1     zonisamide (ZONEGRAN) 25 MG capsule Take 1 capsule (25 mg total) by mouth 3 (three) times a day. 270 capsule 2     fentaNYL (DURAGESIC) 50 mcg/hr Place 1 patch on the skin every other day. 15 patch 0     levothyroxine (SYNTHROID, LEVOTHROID) 100 MCG tablet Take 0.5 tablets (50 mcg total) by mouth daily. 45 tablet 3     ondansetron (ZOFRAN) 4 MG tablet Take 1 tablet (4 mg total) by mouth every 8 (eight) hours as needed for nausea. 30 tablet 2      predniSONE (DELTASONE) 10 mg tablet Take 4 tabs daily for 5 days, then 3 tabs daily for 5 days, then 2 tabs daily for 5 days, then 1 tab daily for 5 days, then stop.. 50 tablet 0     Current Facility-Administered Medications   Medication Dose Route Frequency Provider Last Rate Last Admin     teriparatide injection 20 mcg (FORTEO)  20 mcg Subcutaneous DAILY Caroline Sumner, NOMAN           Social History     Tobacco Use   Smoking Status Former Smoker     Packs/day: 1.00     Years: 20.00     Pack years: 20.00     Quit date: 2000     Years since quittin.9   Smokeless Tobacco Never Used           OBJECTIVE:        Recent Results (from the past 240 hour(s))   Comprehensive Metabolic Panel   Result Value Ref Range    Sodium 135 (L) 136 - 145 mmol/L    Potassium 4.2 3.5 - 5.0 mmol/L    Chloride 100 98 - 107 mmol/L    CO2 25 22 - 31 mmol/L    Anion Gap, Calculation 10 5 - 18 mmol/L    Glucose 82 70 - 125 mg/dL    BUN 21 8 - 28 mg/dL    Creatinine 1.41 (H) 0.60 - 1.10 mg/dL    GFR MDRD Af Amer 44 (L) >60 mL/min/1.73m2    GFR MDRD Non Af Amer 36 (L) >60 mL/min/1.73m2    Bilirubin, Total 0.4 0.0 - 1.0 mg/dL    Calcium 8.4 (L) 8.5 - 10.5 mg/dL    Protein, Total 6.3 6.0 - 8.0 g/dL    Albumin 3.9 3.5 - 5.0 g/dL    Alkaline Phosphatase 45 45 - 120 U/L    AST 41 (H) 0 - 40 U/L    ALT 18 0 - 45 U/L   HM2(CBC w/o Differential)   Result Value Ref Range    WBC 2.5 (L) 4.0 - 11.0 thou/uL    RBC 3.82 3.80 - 5.40 mill/uL    Hemoglobin 12.1 12.0 - 16.0 g/dL    Hematocrit 36.0 35.0 - 47.0 %    MCV 94 80 - 100 fL    MCH 31.6 27.0 - 34.0 pg    MCHC 33.5 32.0 - 36.0 g/dL    RDW 11.7 11.0 - 14.5 %    Platelets 116 (L) 140 - 440 thou/uL    MPV 7.3 7.0 - 10.0 fL   T4, Free   Result Value Ref Range    Free T4 1.2 0.7 - 1.8 ng/dL   Vitamin B12   Result Value Ref Range    Vitamin B-12 825 (H) 213 - 816 pg/mL   Thyroid Stimulating Hormone (TSH)   Result Value Ref Range    TSH 0.58 0.30 - 5.00 uIU/mL   INR   Result Value Ref Range    INR  "3.40 (H) 0.90 - 1.10       Vitals:    12/18/20 1007   BP: 130/72   Pulse: 90   Resp: 18   SpO2: 97%   Weight: 139 lb 7 oz (63.2 kg)   Height: 5' 3\" (1.6 m)     Weight: 139 lb 7 oz (63.2 kg)          Physical Exam:  GENERAL APPEARANCE: A&A, NAD, well hydrated, well nourished  SKIN:  Normal skin turgor, no lesions/rashes   HEENT: moist mucous membranes, no rhinorrhea  NECK: Normal  CV: RRR, no M/G/R   LUNGS: CTAB  EXTREMITY: no edema   NEURO: no gross deficits, able to hold a conversation without any issues, she does have a normal gait, no tremor   PSYCH: Affect is normal, normal eye contact    "

## 2021-06-14 NOTE — TELEPHONE ENCOUNTER
Pt notified. She requested for it to be mailed to her. RX mailed. Pt understands to notify clinic when she starts RX.

## 2021-06-14 NOTE — PATIENT INSTRUCTIONS - HE
POST PROCEDURE              BILATERAL GENICULAR NERVE BLOCK        Please continue your regular activity and keep track of your pain for the next 5 hours on the pain sheet that is given to you. Please return this to [facility] tomorrow or asap via fax, mail, or drop off at clinic.    This is a diagnostic test to determine if these are the correct nerves causing your pain.    The objective of this procedure is to have your pain decrease. However it will return again that same day or a few days later. Once your pain returns it may stay the same or get worse.    You may feel some numbness or tingling, which is from the local anesthetic and may last up to several hours.    If pain returns please document on the pain analog sheet when you took your pain medication.    Fever: call if fever 100 or over, redness/warmth/swelling over injection site.    No public hot tubs/pools for a couple of days.    Monitor your response to the injection and report this at your next visit.    IF YOUR PAIN SHEET DOES NOT DEMONSTRATE ENOUGH RELIEF FOR PROCEDURE WE WILL CALL YOU. IF YOU HAVE ENOUGH RELIEF WE WILL SUBMIT TO YOUR INSURANCE COMPANY FOR APPROVAL. ONCE APPROVED BY YOUR INSURANCE COMPANY THE STAFF WILL CONTACT YOU TO MAKE APPOINTMENT FOR THE RADIOFREQUENCY.          IF ANY QUESTIONS CALL  PAIN CENTER  [phone]

## 2021-06-14 NOTE — TELEPHONE ENCOUNTER
Due to low TTR of 15.6% ACM pharmacist has requested chart review for possible conversion to DOAC.    ACN discussed with patient who is agreeable to discuss further with pharmacist. Her main concerns with DOAC are cost and reversal agents.     Patient will expect pharmacist's phone call when able.

## 2021-06-14 NOTE — TELEPHONE ENCOUNTER
Pharmacy called for clarification of quantity on Duragesic patches 75 mcgs. Chart review shows duplicate orders sent on 12/3, one order for quantity of one patch. Will cancel order for one patch and re order for corrected quantity.     Fentanyl 75 mcg patch last filled 12/4, # 15 patches, 30 day refill, refill due date 1/3/21

## 2021-06-14 NOTE — TELEPHONE ENCOUNTER
Anticoagulation Management    Sandy Spencer, 80 y.o., female is on warfarin. Requested to review for conversion to novel anticoagulant.    Indication for anticoagulation:  PE Treatment    Goal INR Range: 2-3    Time in therapeutic range (TTR):  15.6%    Wt Readings from Last 2 Encounters:   12/18/20 139 lb 7 oz (63.2 kg)   12/03/20 142 lb (64.4 kg)        Lab Results   Component Value Date    INR 1.10 01/07/2021    INR 4.20 (H) 12/31/2020    INR 3.40 (H) 12/18/2020    INR 1.70 (H) 12/11/2020    INR 1.90 (H) 12/04/2020    INR 4.50 (H) 12/02/2020    INR 1.60 (H) 11/24/2020    INR 1.90 (H) 11/17/2020    INR 1.50 (H) 10/27/2020    INR 1.80 (H) 10/07/2020        Lab Results   Component Value Date    CREATININE 1.41 (H) 12/18/2020    CREATININE 1.47 (H) 12/04/2020    CREATININE 1.49 (H) 11/17/2020          Calculated CrCl: 32  ml/min (using actual bodyweight)    Recommendation     Sandy checked with her pharmacy insurance provider and the cost of Eliquis will be approximately $460/month ongoing. Confirmed pricing with [facility] ($492 for the first month). This is unaffordable and she wishes to remain on warfarin at this time and continue to discuss anticoagulation plan with PCP.    Keiko Arguello, PharmD

## 2021-06-14 NOTE — TELEPHONE ENCOUNTER
Tj had the test for covid last Tuesday and was positive and now broke out in shingles for the 13th time.  Also taking prednisone and Valtrex.  Today has questions on taking the both together.  FNA used up to date and instructed patient no know interaction.      COVID 19 Nurse Triage Plan/Patient Instructions    Please be aware that novel coronavirus (COVID-19) may be circulating in the community. If you develop symptoms such as fever, cough, or SOB or if you have concerns about the presence of another infection including coronavirus (COVID-19), please contact your health care provider or visit [email or web address].     Disposition/Instructions    Home care recommended. Follow home care protocol based instructions.    Thank you for taking steps to prevent the spread of this virus.  o Limit your contact with others.  o Wear a simple mask to cover your cough.  o Wash your hands well and often.    Resources    [facility]: About COVID-19: [email or web address]    CDC: What to Do If You're Sick: www.cdc.gov/coronavirus/2019-ncov/about/steps-when-sick.html    CDC: Ending Home Isolation: www.cdc.gov/coronavirus/2019-ncov/hcp/disposition-in-home-patients.html     CDC: Caring for Someone: www.cdc.gov/coronavirus/2019-ncov/if-you-are-sick/care-for-someone.html     [facility]: Interim Guidance for Hospital Discharge to Home: www.health.[facility].[place]/diseases/coronavirus/hcp/hospdischarge.pdf    [facility] clinical trials (COVID-19 research studies): clinicalaffairs.[facility]-clinical-trials     Below are the COVID-19 hotlines at the [place] Department of Health ([facility]). Interpreters are available.   o For health questions: Call 240-041-9944 or 1-109.392.4514 (7 a.m. to 7 p.m.)  o For questions about schools and childcare: Call 090-443-9178 or 1-866.240.4544 (7 a.m. to 7 p.m.)       Reason for Disposition    Caller has medication question only, adult not sick, and triager answers question    Additional  Information    Negative: MORE THAN A DOUBLE DOSE of a prescription or over-the-counter (OTC) drug    Negative: DOUBLE DOSE (an extra dose or lesser amount) of over-the-counter (OTC) drug and any symptoms (e.g., dizziness, nausea, pain, sleepiness)    Negative: DOUBLE DOSE (an extra dose or lesser amount) of prescription drug and any symptoms (e.g., dizziness, nausea, pain, sleepiness)    Negative: Took another person's prescription drug    Negative: DOUBLE DOSE (an extra dose or lesser amount) of prescription drug and NO symptoms (Exception: a double dose of antibiotics)    Negative: Diabetes drug error or overdose (e.g., took wrong type of insulin or took extra dose)    Negative: Caller has medication question about med not prescribed by PCP and triager unable to answer question (e.g., compatibility with other med, storage)    Negative: Request for URGENT new prescription or refill of 'essential' medication (i.e., likelihood of harm to patient if not taken) and triager unable to fill per department policy    Negative: Prescription not at pharmacy and was prescribed today by PCP    Negative: Pharmacy calling with prescription questions and triager unable to answer question    Negative: Caller has URGENT medication question about med that PCP prescribed and triager unable to answer question    Negative: Caller has NON-URGENT medication question about med that PCP prescribed and triager unable to answer question    Negative: Caller requesting a NON-URGENT new prescription or refill and triager unable to refill per department policy    Negative: DOUBLE DOSE (an extra dose or lesser amount) of over-the-counter (OTC) drug and NO symptoms    Negative: DOUBLE DOSE (an extra dose or lesser amount) of antibiotic drug and NO symptoms    Protocols used: MEDICATION QUESTION CALL-A-OH

## 2021-06-14 NOTE — PROGRESS NOTES
PAIN CLINIC FOLLOW UP PROGRESS NOTE    CC:  Chief Complaint   Patient presents with     Back Pain     Neck Pain       HPI  Sandy Spencer is a 77 y.o. female who presents for evaluation   Chief Complaint   Patient presents with     Back Pain     Neck Pain    that is causing continued pain. Since the last visit the patient denies any trips to the urgent care or ED specifically for their pain. The patient denies any new medications, diagnoses since the last visit.  She feels like the fentanyl patches better than the morphine, she has noticed that her blood pressure has decreased and she takes her blood pressure medication as needed.  She reports minimal perfusion, the fentanyl patch works okay and he takes the edge off even though she does not feel like she has to jump off out of her skin she did with the morphine.  She reports that she does not feel like the medication is helping for her knees, she had knee injections 4 months ago and she is planning to have another injection soon.  For RSD in her right hand is acting up and she is also planning to have injection.  Specific questions indicated the patient wanted addressed today include: She will be going to [place] dialysis for 1 week with her sister, if possible to  her medications a few days early.    Major issues:  1. RSD lower limb, bilateral    2. Pain    3. RSD upper limb, right    4. Lumbar radiculopathy    5. Reflex sympathetic dystrophy        Today the pain is located in their back pain, knee pain, limb pain, RSD, and is described as constant ache when it is aggravated it lasts for a few hours, and is rated at a 5/10 on a scale of 1-10  Associated symptoms: Denies any loss of bladder control, fevers/chills, unintentional weight loss, weakness, numbness or pain that interferes with sleep.   Aggravating factors include: Anything, activities, walking.  Alleviating factors: medications, injections  Adverse effects of medications: NONE   Functional  "symptoms:7  Current subjective treatment efficacy: Fair      Previous Medical History  History   Alcohol Use No     History   Drug Use No     Comment: uses medical marijuana     History   Smoking Status     Former Smoker     Packs/day: 1.00     Years: 20.00     Quit date: 1/1/2000   Smokeless Tobacco     Never Used       Pertinent Pain Medications/interventions:  She currently takes Fentanyl 50 mcg/hr. Patient denies any adverse affects at this time.     Last Opioid dose was: Fentanyl 50 mcg/hr patch last switched 11/12/17 @ 1100 am.     Since your last visit the pain interventions you have completed include: She has not participated in any other interventions, she is planning to have injections done by Dr. Mittal in a few days.     Review of Systems:  12 point systems were reviewed with pt as documented on pt health form and the patient denies any new diagnosis or changes in 12 point system review since the last visit.     Physical Exam  Vitals:    11/13/17 1302   BP: 120/60   Patient Site: Right Arm   Patient Position: Sitting   Pulse: 68   Resp: 16   Weight: 158 lb (71.7 kg)   Height: 5' 3\" (1.6 m)     General- patient is alert and oriented, in NAD, well-groomed, well-nourished  Psych- Judgment and insight normal, AOx4, recent and remote memory normal, mood and affect normal  Eyes- pupils are equal and reactive, conjunctiva is clear bilaterally, no ptosis is noted.   Respiratory- breathing is non-labored  Cardiovascular- extremities warm and well perfused, no peripheral edema or varicosities.  Musculoskeletal- gait is normal, extremities with no joint swelling, erythema, or warmth.  Neuro- normal strength, no gait abnormalities, normal sensation to pain, temperature, light touch.  Integumentary- no rashes, dermatitis or discolorations noted throughout, no open wounds noted.    Medications    Current Outpatient Prescriptions:      aspirin 81 MG EC tablet, Take 81 mg by mouth daily., Disp: , Rfl:      " atorvastatin (LIPITOR) 40 MG tablet, Take 1 tablet (40 mg total) by mouth at bedtime., Disp: 90 tablet, Rfl: 3     azelastine (ASTEPRO) 0.15 % (205.5 mcg) Spry nasal spray, 1 spray by Left Nare route 2 (two) times a day as needed., Disp: , Rfl:      busPIRone (BUSPAR) 15 MG tablet, Take 1 tablet (15 mg total) by mouth 2 (two) times a day., Disp: 180 tablet, Rfl: 1     cholecalciferol, vitamin D3, 5,000 unit Tab, Take 1 tablet by mouth daily., Disp: , Rfl:      diphenhydrAMINE (BENADRYL) 25 mg capsule, Take 25 mg by mouth every 6 (six) hours as needed. , Disp: , Rfl:      fentaNYL (DURAGESIC) 50 mcg/hr, Place 1 patch on the skin every third day., Disp: 10 patch, Rfl: 0     furosemide (LASIX) 40 MG tablet, Take 0.5-1 tablets (20-40 mg total) by mouth daily as needed., Disp: 90 tablet, Rfl: 1     levothyroxine (SYNTHROID, LEVOTHROID) 50 MCG tablet, Take 1 tablet (50 mcg total) by mouth daily., Disp: 90 tablet, Rfl: 1     losartan (COZAAR) 25 MG tablet, Take 1 tablet (25 mg total) by mouth 2 (two) times a day., Disp: 180 tablet, Rfl: 1     OMEGA-3/DHA/EPA/FISH OIL (FISH OIL-OMEGA-3 FATTY ACIDS) 300-1,000 mg capsule, Take 1.2 g by mouth 2 (two) times a day., Disp: , Rfl:      omeprazole (PRILOSEC) 20 MG capsule, Take 1 capsule (20 mg total) by mouth daily., Disp: 90 capsule, Rfl: 1     ondansetron (ZOFRAN-ODT) 4 MG disintegrating tablet, Take 1 tablet (4 mg total) by mouth every 8 (eight) hours as needed for nausea., Disp: 15 tablet, Rfl: 3     psyllium (METAMUCIL) 3.4 gram packet, Take 1 packet by mouth 2 (two) times a day., Disp: , Rfl: 0     SUMAtriptan (IMITREX) 50 MG tablet, Take 1 tablet (50 mg total) by mouth every 2 (two) hours as needed for migraine., Disp: 27 tablet, Rfl: 0     traZODone (DESYREL) 100 MG tablet, Take 2-4 tablets (200-400 mg total) by mouth at bedtime., Disp: 360 tablet, Rfl: 1     verapamil (CALAN-SR) 240 MG CR tablet, Take 1 tablet (240 mg total) by mouth at bedtime., Disp: 90 tablet, Rfl:  1    Lab:  Last UDS on 06/27/2017 was positive for the currently prescribed narcotics. Please see the scanned document from the outside lab. This was reviewed with the patient.      Imaging:  No new imaging.      Recent   Dated 11/13/2017 was reviewed with the patient today.       Assessment:   Sandy Spencer is a 77 y.o. female seen in clinic today for   Chief Complaint   Patient presents with     Back Pain     Neck Pain   . They are here for follow up and continued medical management of their pain. Today we reviewed the lab work of the UDT test and the results are expected because of the prescribed narcotics found in the urine drug screen.     I have also reviewed the information obtained from the last visit encounter after the CHAPO was signed and have asked any pertintent questions needed from other healthcare providers/family/patient to continue care and formulate a treatment plan.     Patients current MME is 120  Patient to call clinic for next month's prescription 5 days in advance. Based on the patients response to their previous narcotic therapy and quality of life, which includes their participation in ADLs, I recommend that the narcotics therapy continue, however the changes listed below will be incorporated into their plan of care.     Patient set goals to   1. Pt will like to be free of pain  2. She will like to be mobile as much as possible.  Get some solution for her knees.     Plan:     -Will continue with Fentanyl patch 50 mcg to change every 3 days.  Will fill medications early 11/22/17 to use from 11/26/17.  Pt will be travelling out of town for 1 week.      -Follow up in 1 month to evaluate the effectiveness of the narcotic and other treatment interventions ordered today.     -You may schedule an appointment with Dr. Mittal for injections.      -Prescription Drug Management will be continued by the [facility]  A narcotic contract was signed by the patient and  Patient is unable  to get narcotics from other providers. They will be subject to random UAs.      Orders placed today  Medications that were ordered today   Requested Prescriptions     Signed Prescriptions Disp Refills     fentaNYL (DURAGESIC) 50 mcg/hr 10 patch 0     Sig: Place 1 patch on the skin every third day.     No orders of the defined types were placed in this encounter.      UDT/SWAB:  Patient required a random Urine Drug Testing, due to the need to comply with [place] [employer] Policy Guidelines and CDC Guideline for the use of any controlled substances. This is to ensure that patient is compliant with treatment, and monitor for risks such as diversion, abuse, or any other aberrant behaviors. Patient is either being considered for or taking a controlled substance. Unexpected findings will be discussed and treatment decision may be adjusted. Testing is being implemented across the board randomly w/o bias related to age, race, gender, socioeconomic status or [employer]ism affiliation.    The patient understand todays plan and has their questions answered in regards to expectations and current treatment plan.     SAFETY REMINDERS  No alcohol while taking controlled substances. Alcohol is not an illegal substance, it is unsafe to use in combination. It is a build up of substances in the body that can be extremely hazardous and may cause respirations to slow to a dangerous rate resulting in hospitalization, brain damage, or death.    Opioid medications have been associated with sharp rise in unintentional overdose and death.  Overdose is a condition characterized by the consumption in excess of a particular drug causing adverse effects. This can happen b/c you are sick, accidentally or intentionally took an extra dose, are on multiple medication that can interact. Someone took your medication and they are not use to the medication.  Symptoms of overdose include:     breathing slow and shallow, erratic or not at all    pinpoint  pupils, hallucinations    Confusion    muscle jerks, slack muscles     extreme sleepiness or loss of alertness     awake but not able to talk     face pale or clammy, vomiting, for lighter skinned people, the skin tone turns bluish purple, for darker skinned people, it turns grayish or ashen   If in a situation where overdose is a concern engage the emergency response system (dial 911).    In one study it was noted that 80% of unintentional overdoses occurred in people who were taking a combination of opioids and benzodiazepines.    Do not sell, loan, borrow or share your opioid medication with anyone. Deaths have occurred as a result of this practice. It is illegal and patients are being prosecuted.     Prevent unexpected access/loss of medication: Keep medication locked. Only carry what you need with you.       Universal Precautions:   UDS/Swab- 06/06/2016   Consent-   Agreement- 04/14/2017  Pharmacy- as documented   - 11/13/17 reviewed, ok  Count- 268 Morphine 30 mg  Psychological evaluation n/a  Pharmacogenetic testing- n/a  MME- 150    Stacie Damon CNP  [facility]  [address]  [place] [address]  Ph: [phone]  Fax: [phone]

## 2021-06-14 NOTE — PROGRESS NOTES
Patient here for bilateral genicular nerve blocks, she rates the pain between  a 5 or 6 and it moves back and forth in severity from right to left.      Post procedure patient rates her pain at zero bilaterally, pain analog sent with patient with instructions for documentation.

## 2021-06-14 NOTE — TELEPHONE ENCOUNTER
Who is calling:  Sandy  Reason for Call:  Patient called requesting to speak with Angie or Shey. She has been sick with Covid & didn't take her medication due to vomiting.  Date of last appointment with primary care: 1/8/2021  Okay to leave a detailed message: Yes

## 2021-06-14 NOTE — TELEPHONE ENCOUNTER
"Pt returned pain anolog sheet to clinic for left knee genicular nerve block. Reviewed by Dr. Mittal and indicated \"GOOD\". Scanned into chart. ordered placed for RF left knee genicular nerve. Will PA  "

## 2021-06-14 NOTE — TELEPHONE ENCOUNTER
Who is calling:  Patient  Reason for Call:  The patient is calling [facility] as she has tested positive for COVID and is unable to complete her INR lab at this time. She does have some bruising currently. Please advise at the number provided.     Okay to leave a detailed message: Yes

## 2021-06-14 NOTE — PROGRESS NOTES
Wants help connecting with the [facility] for survivor benefits, contacted [facility] services [phone] to help get the process started.   Will be moving in Feb to [address] apartments in [place]. ([facility]) currently living in [facility].
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
## 2021-06-14 NOTE — PROGRESS NOTES
ASSESSMENT/PLAN:       1. Osteopenia  -We discussed the risk of a major osteoporotic fracture and she does meet criteria to consider treatment with the medication.  We discussed the risks and benefits of doing this and she would like to proceed with starting with Fosamax.  She will follow-up with endocrinology in the spring with her already existing appointment and she will follow-up here sooner if she is having issues or concerns.  She has done well with Fosamax in the past.  - alendronate (FOSAMAX) 70 MG tablet; Take 1 tablet (70 mg total) by mouth every 7 days.  Dispense: 12 tablet; Refill: 3    2. Chronic pain syndrome  -Doing well, does have chronic migraines which generally respond well to Imitrex, renewing today.  - SUMAtriptan (IMITREX) 50 MG tablet; Take 1 tablet (50 mg total) by mouth every 2 (two) hours as needed for migraine.  Dispense: 27 tablet; Refill: 1    3. Mixed hyperlipidemia  -Tolerating the atorvastatin without issues, updating lab work today and follow-up in 3 months.  - Lipid Cascade  - Comprehensive Metabolic Panel    4. Skin lesion  -History of having had lesions removed in the past but currently no obvious worrisome lesions.  Referring to dermatology for formal skin exam.  - Ambulatory referral to Dermatology    5. Dermatochalasis of left eyelid  -Referral placed ophthalmology due to feeling of decreased vision which she suspects is secondary to her redundant skin.  - Ambulatory referral to Ophthalmology    6. Hypertension  -Doing well, continue to work on diet and exercise as able and renewing losartan for now, follow-up in 6 months.  - losartan (COZAAR) 25 MG tablet; Take 1 tablet (25 mg total) by mouth 2 (two) times a day.  Dispense: 180 tablet; Refill: 1    7. Insomnia  -Doing well on trazodone, renewing today, follow-up in 6 months  - traZODone (DESYREL) 100 MG tablet; Take 2-4 tablets (200-400 mg total) by mouth at bedtime.  Dispense: 360 tablet; Refill: 1    Overall things are  stable, I do recommend she follow-up in 2-3 months for recheck just secondary to her mood issues.      Caitlyn Rees MD      PROGRESS NOTE   12/8/2017    SUBJECTIVE:  Sandy Spencer is a 77 y.o. female  who presents for follow up.     Her medication plan is increasing in cost. It will be 25% more per month. She is going to be going to [facility] still, rather than [facility] bought [employer]. She is quite concerned about the cost of her medications, but most of the expensive ones are through the pain clinic. She would like to switch to every three month refills as she would like to limit the number of trips to the pharmacy.     She is wondering if she is due for her thyroid labs.     She doesn't have any heartburn. She does have occasional symptoms at times. She is wondering if she could discontinue this medication.     She did have an injection in her arm two weeks ago and has had an injectio in her back last week and in two weeks for her RSD. She did have her knees injected as well at the pain clinic a few weeks ago.     She was on Fosamax in [place] as a preventive medication. She does have an appointment in Feb for osteoporosis. The MD that did the pain injection at the [place] pain center recommended starting osteoporosis medication. She does not remember how long she was on it. She has had osteopenia for sometime. We calculated her FRAX score today and her risk of a major osteoporotic fracture is 13.7% and her risk of a hip fracture is 3.7%.     She has had things frozen in the past. She is wondering if she can get a referral to a dermatologist to have a formal skin exam.     She does have congestion in her face and can get pain in the bones of her left face. She does only take the astelin rarely. Will restart the astelin. She hasn't done well with FLonase in the past. She has done nasonex she thinks as well.     She is currently doing well with her blood pressure medication.  Exercise is limited secondary  to her chronic pain.  She would like a refill of her losartan.    Her peripheral vision seems to be going down.She hasn't had her eyes checked in about 1.5 years.She has had her eyelids done in 2010. She does think that this is due to her left eyelid drooping again.   Chief Complaint   Patient presents with     Follow-up     Patient is here today for a follow up and medication check. Patient would like to talk about starting fosamax.      Referral     Patient would like a referral for dermatology.          Patient Active Problem List   Diagnosis     Chronic kidney disease (CKD) stage G3a/A1, moderately decreased glomerular filtration rate (GFR) between 45-59 mL/min/1.73 square meter and albuminuria creatinine ratio less than 30 mg/g     Focal glomerular sclerosis     Anxiety and depression     RSD lower limb, bilateral     ADD (attention deficit disorder)     RSD upper limb, right     Other specified hypothyroidism     Anxiety     Osteopenia     Major depression     Hypertension     Insomnia     Mixed hyperlipidemia     Right rotator cuff tear     Cluster headaches     Lumbar radiculopathy     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     Stenosis of lateral recess of lumbar spine     Reflex sympathetic dystrophy     Acute encephalopathy     Temporomandibular joint-pain-dysfunction syndrome (TMJ)     Pancreatitis     Localized swelling of lower leg     Medical cannabis use     Acute right-sided low back pain without sciatica     Acute exacerbation of chronic low back pain     Acute pancreatitis     Accelerated hypertension     Acquired hypothyroidism     Chronic pain syndrome     Non morbid obesity due to excess calories     Snoring     Inadequate pain control     Diarrhea     Dehydration     Abdominal pain     Dermatochalasis of left eyelid       Current Outpatient Prescriptions   Medication Sig Dispense Refill     aspirin 81 MG EC tablet Take 81 mg by mouth daily.       atorvastatin (LIPITOR) 40 MG tablet  Take 1 tablet (40 mg total) by mouth at bedtime. 90 tablet 3     azelastine (ASTEPRO) 0.15 % (205.5 mcg) Spry nasal spray 1 spray by Left Nare route 2 (two) times a day as needed.       busPIRone (BUSPAR) 15 MG tablet Take 1 tablet (15 mg total) by mouth 2 (two) times a day. 180 tablet 1     cholecalciferol, vitamin D3, 5,000 unit Tab Take 1 tablet by mouth daily.       diclofenac sodium (VOLTAREN) 1 % Gel Apply twice a day as needed 100 g 2     diphenhydrAMINE (BENADRYL) 25 mg capsule Take 25 mg by mouth every 6 (six) hours as needed.        furosemide (LASIX) 40 MG tablet Take 0.5-1 tablets (20-40 mg total) by mouth daily as needed. 90 tablet 1     levothyroxine (SYNTHROID, LEVOTHROID) 50 MCG tablet Take 1 tablet (50 mcg total) by mouth daily. 90 tablet 1     losartan (COZAAR) 25 MG tablet Take 1 tablet (25 mg total) by mouth 2 (two) times a day. 180 tablet 1     OMEGA-3/DHA/EPA/FISH OIL (FISH OIL-OMEGA-3 FATTY ACIDS) 300-1,000 mg capsule Take 1.2 g by mouth 2 (two) times a day.       psyllium (METAMUCIL) 3.4 gram packet Take 1 packet by mouth 2 (two) times a day.  0     SUMAtriptan (IMITREX) 50 MG tablet Take 1 tablet (50 mg total) by mouth every 2 (two) hours as needed for migraine. 27 tablet 1     traZODone (DESYREL) 100 MG tablet Take 2-4 tablets (200-400 mg total) by mouth at bedtime. 360 tablet 1     verapamil (CALAN-SR) 240 MG CR tablet Take 1 tablet (240 mg total) by mouth at bedtime. 90 tablet 1     alendronate (FOSAMAX) 70 MG tablet Take 1 tablet (70 mg total) by mouth every 7 days. 12 tablet 3     [START ON 12/20/2017] fentaNYL (DURAGESIC) 50 mcg/hr Place 1 patch on the skin every third day. 10 patch 0     No current facility-administered medications for this visit.        History   Smoking Status     Former Smoker     Packs/day: 1.00     Years: 20.00     Quit date: 1/1/2000   Smokeless Tobacco     Never Used           OBJECTIVE:        Recent Results (from the past 240 hour(s))   Lipid Cascade   Result  Value Ref Range    Cholesterol 221 (H) <=199 mg/dL    Triglycerides 65 <=149 mg/dL    HDL Cholesterol 82 >=50 mg/dL    LDL Calculated 126 <=129 mg/dL    Patient Fasting > 8hrs? Unknown    Comprehensive Metabolic Panel   Result Value Ref Range    Sodium 139 136 - 145 mmol/L    Potassium 4.2 3.5 - 5.0 mmol/L    Chloride 106 98 - 107 mmol/L    CO2 21 (L) 22 - 31 mmol/L    Anion Gap, Calculation 12 5 - 18 mmol/L    Glucose 159 (H) 70 - 125 mg/dL    BUN 16 8 - 28 mg/dL    Creatinine 0.88 0.60 - 1.10 mg/dL    GFR MDRD Af Amer >60 >60 mL/min/1.73m2    GFR MDRD Non Af Amer >60 >60 mL/min/1.73m2    Bilirubin, Total 0.5 0.0 - 1.0 mg/dL    Calcium 8.6 8.5 - 10.5 mg/dL    Protein, Total 6.2 6.0 - 8.0 g/dL    Albumin 3.3 (L) 3.5 - 5.0 g/dL    Alkaline Phosphatase 64 45 - 120 U/L    AST 21 0 - 40 U/L    ALT 12 0 - 45 U/L       Vitals:    12/08/17 1409   BP: 138/80   Patient Site: Left Arm   Patient Position: Sitting   Cuff Size: Adult Regular   Pulse: 82   SpO2: 98%     Weight: 158 lb (71.7 kg)        Physical Exam:  GENERAL APPEARANCE: A&A, NAD, well hydrated, well nourished  SKIN:  Normal skin turgor, no obviously concerning skin lesions  HEENT: moist mucous membranes, no rhinorrhea, left eyelid has redundant skin present, pharynx unremarkable  NECK: Normal, without lymphadenopathy  CV: RRR, no M/G/R   LUNGS: CTAB  ABDOMEN: S&NT, no masses   EXTREMITY: no edema   NEURO: no gross deficits  Psych: Her affect is stable but continues to be slightly anxious, she is casually dressed and groomed, her thought process and speech pattern are normal

## 2021-06-14 NOTE — TELEPHONE ENCOUNTER
Sandy said she just got over Covid. Done with quarantine. Migraine yesterday, took Imitrex.     Calling this am because she forgot Warfarin yesterday (1/15) so took it today, first thing this am. Wants to know what to do for rest of weekend. Plans to talk to [facility] on Monday, has appointment with them Tuesday 19th, she thinks.     Has Coumadin 5's and 2.5 mg tabs, usually takes 7.5 mg daily except Monday's. Thinks usual range is 2-3. Has been low. Doesn't think has missed except last night.     Tuesday Jan 12 INR was  1.1      Paged on call Dr. Christine Pascual. Go back to usual schedule, for example take 7.5 tonight and tomorrow and on Monday call [facility] with plans for appointment Tuesday.     Sandy verbalized understanding. Discussed using pill box and taking in evening at same time.

## 2021-06-14 NOTE — TELEPHONE ENCOUNTER
Spoke with Sandy and she missed a dose of her warfarin due to having a migraine. She did call triage and was told per on call to take a total of 15 mg on Saturday. She does have an INR appointment scheduled for tomorrow. She also now has shingles.  She also did get a call from her MD office and they will mail the written prescription for the Eliquis so she can get this from [facility] where it is cheaper for her.
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
## 2021-06-14 NOTE — TELEPHONE ENCOUNTER
Please call patient, I have the prescription printed for her. If she does end up finding this more affordably please let us know when she starts taking it.

## 2021-06-14 NOTE — PROGRESS NOTES
Gave patient number to call [employer] benefits. Neither the patient not the care guide have heard back from them.   Plans on moving to the [place] in February and possibly renting out her home to a few college girls from her [employer].

## 2021-06-14 NOTE — TELEPHONE ENCOUNTER
Patient is requesting to fill fentanyl 75 mcg today.  Will adjust prescription to reflect appropriate notes and dates with the start date to remain 1/3/21.  Requested Prescriptions     Pending Prescriptions Disp Refills     fentaNYL (DURAGESIC) 75 mcg/hr 15 patch 0     Sig: Place 1 patch on the skin every other day. To start this RX on 1/3/21     Signed Prescriptions Disp Refills     fentaNYL (DURAGESIC) 75 mcg/hr 15 patch 0     Sig: Place 1 patch on the skin every other day.     Authorizing Provider: ARELIS FITZPATRICK

## 2021-06-14 NOTE — PROGRESS NOTES
Was in a meeting so could only talk briefly, she has not heard back from the [facility] yet so I put in another call for them to contact her regarding survivor benefits.

## 2021-06-14 NOTE — TELEPHONE ENCOUNTER
ANTICOAGULATION  MANAGEMENT    Assessment     Today's INR result of 2.1 is Therapeutic (goal INR of 2.0-3.0)        Warfarin taken differently than instructed, but no impact to total weekly dose    No new diet changes affecting INR    No new medication/supplements affecting INR    Continues to tolerate warfarin with no reported s/s of bleeding or thromboembolism     Previous INR was Subtherapeutic    Plan:     Spoke on phone with Sandy regarding INR result and instructed:     Warfarin Dosing Instructions:  Continue current warfarin dose 5 mg daily on Monday; and 7.5 mg daily rest of week  (0 % change)    Instructed patient to follow up no later than: 1/25    Education provided: target INR goal and significance of current INR result and potential interaction between warfarin and inflammation related to Shingles and post-COVID    Sandy verbalizes understanding and agrees to warfarin dosing plan.    Instructed to call the [facility] for any changes, questions or concerns. (#[phone])   ?   Alisha Kumari RN    Subjective/Objective:      Sandy Spencer, a 80 y.o. female is on warfarin.     Sandy reports:     Home warfarin dose: as updated on anticoagulation calendar per template     Missed doses: Yes: Friday 1/15-- was advised by on-call provider to take 7.5mg in AM on 1/16 and 7.5mg in PM on 1/16     Medication changes:  No     S/S of bleeding or thromboembolism:  No     New Injury or illness:  Yes: Shingles, recovering from COVID     Changes in diet or alcohol consumption:  No     Upcoming surgery, procedure or cardioversion:  No    Anticoagulation Episode Summary     Current INR goal:  2.0-3.0   TTR:  15.7 % (1 y)   Next INR check:  1/19/2021   INR from last check:  2.10 (1/19/2021)   Weekly max warfarin dose:     Target end date:     INR check location:     Preferred lab:     Send INR reminders to:  [facility]    Indications    Other chronic pulmonary embolism without acute cor pulmonale (H)  [I27.82]           Comments:           Anticoagulation Care Providers     Provider Role Specialty Phone number    Caitlyn Rees MD Referring Family Medicine [phone]

## 2021-06-14 NOTE — TELEPHONE ENCOUNTER
ANTICOAGULATION  MANAGEMENT    Assessment     Today's INR result of 2.4 is Therapeutic (goal INR of 2.0-3.0)        Warfarin taken as previously instructed    No new diet changes affecting INR    No new medication/supplements affecting INR    Continues to tolerate warfarin with no reported s/s of bleeding or thromboembolism     Previous INR was Therapeutic    Plan:     Left detailed message for Sandy regarding INR result and instructed:     Warfarin Dosing Instructions:  Continue current warfarin dose 5 mg daily on Mon; and 7.5 mg daily rest of week      Instructed patient to follow up no later than: 2 weeks    Education provided: target INR goal and significance of current INR result        Instructed to call the [facility] for any changes, questions or concerns. (#[phone])   ?   Shey Tilley RN    Subjective/Objective:      Sandy ELSIE Spnecer, a 80 y.o. female is on warfarin.     Sandy reports:     Home warfarin dose: as updated on anticoagulation calendar per template     Missed doses: No     Medication changes:  No     S/S of bleeding or thromboembolism:  No     New Injury or illness:  No     Changes in diet or alcohol consumption:  No     Upcoming surgery, procedure or cardioversion:  No    Anticoagulation Episode Summary     Current INR goal:  2.0-3.0   TTR:  17.4 % (1 y)   Next INR check:  2/8/2021   INR from last check:  2.40 (1/25/2021)   Weekly max warfarin dose:     Target end date:     INR check location:     Preferred lab:     Send INR reminders to:  [facility]    Indications    Other chronic pulmonary embolism without acute cor pulmonale (H) [I27.82]           Comments:           Anticoagulation Care Providers     Provider Role Specialty Phone number    Caitlyn Rees MD Referring Family Medicine [phone]

## 2021-06-14 NOTE — TELEPHONE ENCOUNTER
Called and spoke to patient. Has not been feeling well for some time, had some tightness in her chest and a cough and nasal congestion so had a COVID test done at [facility] on Tuesday which was positive. She did not think this was possible as she was already COVID + back in May.    Appetite is stable but not great. She notes a new bruise on her hip, about 4 inches around. She was also concerned that she tore a hangnail off and it oozed blood for a long time. Otherwise no new or concerning symptoms.     INR last week was 4.2, she is currently finishing a prednisone taper but stating she is hoping PCP will up her dose again at video visit tomorrow given her current symptoms.     ACN advised INR today or tomorrow, labs are aware that we are scheduling COVID positive patients when medically necessary. Sandy is agreeable. Called [facility] and spoke to  there to update and have patient put on as an add on.

## 2021-06-14 NOTE — TELEPHONE ENCOUNTER
Spoke with Sandy today and she wanted to switch from warfarin to Eliquis. But in [place] it is over 440$ per month. She wants the pharmacist that she talked with to know she can get generic Eliquis from [facility] for 1$ a pill verses 10$ a pill here. Says the prescription must say use the generic version. She would like a written prescription so she can do this. I explained ACN is not sure how this works but will give message to the pharmacist.

## 2021-06-14 NOTE — TELEPHONE ENCOUNTER
Anticoagulation Management    Defer to Dr. Rees if considering this option with patient is appropriate given very low TTR.    Thank you,   Keiko Arguello, PharmD

## 2021-06-14 NOTE — TELEPHONE ENCOUNTER
"Pt returned pain sheet for right knee genicular nerve block to the clinic reviewed by Dr. Mittal and indicated \"good\" PA sent and scanned to chart  "
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
## 2021-06-14 NOTE — PROGRESS NOTES
Sandy Spencer is a 80 y.o. female who is being evaluated via a billable telephone visit.      What phone number would you like to be contacted at? [phone]  How would you like to obtain your AVS? AVS Preference: Mail a copy.    Patient is here for a follow up appointment with Dr. Lynn. Patient has low back, bilateral hips but left hip is worse,RSD,bilateral knees but left knee is worse, left sided neck, migraines and left leg  pain, describes the pain as constant, deep ache, muscle pain, intermittent increased intensity,rates the pain as 6/10. Patient needs refills for Fentanyl patch. Functionality is 7. Patient states their pain interferes with sleep, work, ADL's and relationships .    Perla Maya LPN

## 2021-06-15 ENCOUNTER — HOSPITAL ENCOUNTER (OUTPATIENT)
Dept: PALLIATIVE MEDICINE | Facility: OTHER | Age: 81
Discharge: HOME | End: 2021-06-15
Attending: ANESTHESIOLOGY
Payer: MEDICARE

## 2021-06-15 DIAGNOSIS — S06.0X9D CONCUSSION WITH LOSS OF CONSCIOUSNESS, SUBSEQUENT ENCOUNTER: ICD-10-CM

## 2021-06-15 DIAGNOSIS — G89.4 CHRONIC PAIN SYNDROME: ICD-10-CM

## 2021-06-15 PROBLEM — R06.83 SNORING: Status: ACTIVE | Noted: 2017-04-24

## 2021-06-15 NOTE — TELEPHONE ENCOUNTER
FYI - Status Update  Who is Calling: Patient  Update: Returning call from Artie. Tried to warm transfer. Please call patient back.   Okay to leave a detailed message?:  Yes
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
## 2021-06-15 NOTE — TELEPHONE ENCOUNTER
Bp 184/? And clammy, sweating, dizzzy, right arm pain/neck.    Recent fall and fracture.    I think she needs to be seen ER for possible stroke.  She says all the arm and neck pain are due to the recent fall.  She will call 911 for medical transport.  It is snowing an slippery and she hesitates to go outside.    Ragini Rowe RN [facility] Triage Nurse Advisor    Additional Information    Negative: Sounds like a life-threatening emergency to the triager    Negative: Pregnant > 20 weeks or postpartum (< 6 weeks after delivery) and new hand or face swelling    Negative: Pregnant > 20 weeks and BP > 140/90    Systolic BP >= 160 OR Diastolic >= 100, and any cardiac or neurologic symptoms (e.g., chest pain, difficulty breathing, unsteady gait, blurred vision)    Protocols used: HIGH BLOOD PRESSURE-A-OH
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
## 2021-06-15 NOTE — PROGRESS NOTES
"Mental Health Visit Note    1/10/2018   Start time: 1200    Stop Time: 1250   Session # 4    Sandy Spencer is a 77 y.o. female is being seen today for    Chief Complaint   Patient presents with     Mental Health Visit   .     New symptoms or complaints: None    Functional Impairment:   Personal: 4  Family: 3  Work: NA  Social:3    Clinical assessment of mental status: Patient presented alert and oriented x3.  She was well-groomed.  Her attire was appropriate.  Patient was cooperative.  Patient motor actively was normal and eye contact appropriate.  Patients mood was anxious and affect congruent.  Patient s speech and language was normal.  Patient attention and thought process normal.  Concentration was appropriate.  Patient denied delusions or hallucinations. Patient denied suicidal or homicidal ideation.  Patient denied any long or short term memory problems. No evidence of impairment in judgment.  She has insight and fund of knowledge was adequate.        Suicidal/Homicidal Ideation present: None Reported This Session    Patient's impression of their current status: Pt reports increase in chronic pain and multiple psychosocial stressors that are impacting daily functioning.     Therapist impression of patients current state: Pt reports she is having increase in chronic pain symptoms, which has been difficult to cope with on a daily basis.  PT reports she is also very stressed out about having to move in February with no one to really help her. Discussed some options/friends that might be able/willing to help her move.  Processed anxiety related to moving and the \"unknown\" of new location.  Processed family stressors that also continue to impact her.  Did not get to EMDR calm place today, but discussed using grounding strategies to begin EMDR treatment.     Type of psychotherapeutic technique provided: Insight oriented, Client centered, Solution-focused, CBT and EMDR    Progress toward short term goals:Progress " as expected, self-care, coping skills, stress management    Review of long term goals: Date of last review 1/10/2018    Diagnosis:   1. Severe recurrent major depression without psychotic features    2. Chronic post-traumatic stress disorder (PTSD)    3. Generalized anxiety disorder    4. Chronic pain syndrome        Plan and Follow up: Practice self-care as discussed  Reach out to some friends for moving help  Attend appointment with providers as scheduled  Follow upw with Brinda in 1 week  Will begin Calm Place exercise next week      Discharge Criteria/Planning: Patient will continue with follow-up until therapy can be discontinued without return of signs and symptoms.    Ricarda Burns 1/12/2018      This note was created with help of Dragon dictation software. Grammatical / typing errors are not intentional and inherent to the software.

## 2021-06-15 NOTE — TELEPHONE ENCOUNTER
Anticoagulation Management    Requested to review for conversion from warfarin to Eliquis. Sandy has received her supply and would like to transition off warfarin this week.     Discontinue warfarin and start apixaban when the INR is below 2.     Anticoagulation calendar updated to reflect plan for transition:  Tuesday, February 23 - warfarin 5 mg   Wednesday, February 24- hold warfarin  Thursday, February 25 - Check INR prior to therapy appointment. If INR less than 2, begin Eliquis in PM. [facility] will call to instruct.      Reviewed plan with Sandy. She verbalized understanding of the instructions.    Keiko Arguello, MickyD

## 2021-06-15 NOTE — TELEPHONE ENCOUNTER
Prior Authorization Request  Who s requesting:  Pharmacy  Pharm[facility] Name and Location: [facility]  Medication Name: Repatha  Insurance Plan: ?  Insurance Member Number:  [id number]  CoverMyMeds Key: [id number]  Informed patient that prior authorizations can take up to 10 business days for response:   No  Okay to leave a detailed message: Yes

## 2021-06-15 NOTE — PROGRESS NOTES
Mental Health Visit Note    1/17/2018   Start time: 1200    Stop Time: 1250   Session # 6    Sandy Spencer is a 77 y.o. female is being seen today for    Chief Complaint   Patient presents with     Mental Health Visit   .     New symptoms or complaints: None    Functional Impairment:   Personal: 4  Family: 2  Work: NA  Social:2    Clinical assessment of mental status: Patient presented alert and oriented x3.  She was well-groomed.  Her attire was appropriate.  Patient was cooperative.  Patient motor actively was normal and eye contact appropriate.  Patients mood was anxious and affect congruent.  Patient s speech and language was normal.  Patient attention and thought process normal.  Concentration was appropriate.  Patient denied delusions or hallucinations. Patient denied suicidal or homicidal ideation.  Patient denied any long or short term memory problems. No evidence of impairment in judgment.    She has insight and fund of knowledge was adequate.        Suicidal/Homicidal Ideation present: None Reported This Session    Patient's impression of their current status: Pt reports ongoing symptoms of depression, anxiety & chronic pain that impacts daily functioning.     Therapist impression of patients current state: Continued to discussed EMDR process.  Discussed calm place and completed calm place exercise.  Pt struggled at the beginning of the calm place, but eventually able to hold it and notice switch from a small annoyance back to calm place.  Processed experience after completing and discussed how it can be used at home to help with high stress, pain or anxiety.  Encouraged doing it along with butterfly tapping.      Type of psychotherapeutic technique provided: EMDR    Progress toward short term goals:Progress as expected, Calm Place Exercise    Review of long term goals: Date of last review 1/5/2018    Diagnosis:   1. Severe recurrent major depression without psychotic features    2. Chronic  post-traumatic stress disorder (PTSD)    3. Generalized anxiety disorder    4. Chronic pain syndrome        Plan and Follow up: Practice Calm Place exercise at home regularly  Follow up with Brinda in 1 week  Follow up with providers as scheduled      Discharge Criteria/Planning: Patient will continue with follow-up until therapy can be discontinued without return of signs and symptoms.    Ricarda Burns 1/24/2018

## 2021-06-15 NOTE — PATIENT INSTRUCTIONS - HE
PLAN:    You will discuss concerns about lisinopril with your primary care provider.    We will schedule with Dr. Mittal an injection for your hips.    Continue with the fentanyl 75 mcg every 48 hours.    Continue with the lamotrigine 200 mg twice a day.    Follow-up with Dr. Lynn in 8 to 12 weeks.

## 2021-06-15 NOTE — PROGRESS NOTES
Injection - Other  Date/Time: 1/17/2018 12:10 PM  Performed by: ALECIA JONES  Authorized by: ALECIA JONES   Comments:     TRIGGER POINT INJECTION  Performed on: 1/17/2018    Ms. Spencer is a 77-year-old woman with multi-level pain symptoms.  Today she is here to address the pain in the lateral portion of her right upper extremity.  The pain is involving the deltoid, brachialis and triceps muscle in the right upper extremity.  This appears to be myofascial pain and she is here today to give trial to trigger point injections.    Pre Procedure Diagnosis:  Myofascial pain  Vital Signs:  As per intra-procedure documentation    The procedure was discussed with Sandy Spencer in detail along with the attendant risks, including but not limited to: nerve injury, infection, bleeding, allergic reaction, or worsening of pain.  Informed consent was obtained and patient elected to proceed.    After informed consent was obtained the patient was seated in the examination chair.  The right lateral arm area was prepped sterilely with alcohol.  A 1 1/4 inch #27-gauge needle was used.  An injection was made in the lateral portion of the right deltoid muscle and in the mid and lower portion of the right brachialis muscle.  A trigger point was also injected in the lower portion of the right triceps muscle.  A total of 8 mL of 0.25% Marcaine with 8 mg of Decadron was used in divided doses.    The patient tolerated the procedure well.  The patient was taken to the recovery area after the injection.  There were no complications.      Pre Procedure Pain Scale: 8  Pain Score: 0-No pain (Post-Procedure)    If Sandy Spencer has any questions or concerns after discharge, she was instructed to call us.

## 2021-06-15 NOTE — TELEPHONE ENCOUNTER
ANTICOAGULATION  MANAGEMENT    Assessment     Yesterday's INR result of 2.4 is Therapeutic (goal INR of 2.0-3.0)        Warfarin taken as previously instructed    No new diet changes affecting INR    No new medication/supplements affecting INR    Continues to tolerate warfarin with no reported s/s of bleeding or thromboembolism     Previous INR was Therapeutic    Plan:     Spoke on phone with Sandy regarding INR result and instructed:      Warfarin Dosing Instructions:  Continue current warfarin dose 5 mg daily on Mon; and 7.5 mg daily rest of week  (0 % change)    Instructed patient to follow up no later than: 4 weeks    Education provided: importance of notifying clinic for changes in medications, monitoring for bleeding signs and symptoms and importance of notifying clinic for diarrhea, nausea/vomiting, reduced intake and/or illness    Sandy verbalizes understanding and agrees to warfarin dosing plan.    Instructed to call the [facility] for any changes, questions or concerns. (#[phone])   ?   Caitlyn Espana RN    Subjective/Objective:      Sandy ELSIE Spencer, a 80 y.o. female is on warfarin. Sandy Delgado reports:     Home warfarin dose: verbally confirmed home dose with Sandy and updated on anticoagulation calendar     Missed doses: No     Medication changes:  No     S/S of bleeding or thromboembolism:  No     New Injury or illness:  No     Changes in diet or alcohol consumption:  No     Upcoming surgery, procedure or cardioversion:  No    Anticoagulation Episode Summary     Current INR goal:  2.0-3.0   TTR:  21.5 % (1 y)   Next INR check:  3/10/2021   INR from last check:  2.40 (2/9/2021)   Weekly max warfarin dose:     Target end date:     INR check location:     Preferred lab:     Send INR reminders to:  [facility]    Indications    Other chronic pulmonary embolism without acute cor pulmonale (H) [I27.82]           Comments:           Anticoagulation Care Providers     Provider Role  Specialty Phone number    Caitlyn Rees MD Referring Family Medicine [phone]        Caitlyn Dupree, RN, BSN  Anticoagulation Clinic

## 2021-06-15 NOTE — PROGRESS NOTES
"Assessment/Plan:     Problem List Items Addressed This Visit     RSD lower limb, bilateral    Relevant Medications    fentaNYL (DURAGESIC) 75 mcg/hr    Osteopenia - Primary    Relevant Orders    OPS Joint Injection              No follow-ups on file.    Patient Instructions   PLAN:    You will discuss concerns about lisinopril with your primary care provider.    We will schedule with Dr. Mittal an injection for your hips.    Continue with the fentanyl 75 mcg every 48 hours.    Continue with the lamotrigine 200 mg twice a day.    Follow-up with Dr. Lynn in 8 to 12 weeks.        Subjective:       80 y.o. female The patient has been notified of the following:      \"We have found that certain health care needs can be provided without the need for a face to face visit.  This service lets us provide the care you need with a phone conversation.       I will have full access to your [facility] medical record during this entire phone call.   I will be taking notes for your medical record.      Since this is like an office visit, we will bill your insurance company for this service.       There are potential benefits and risks of telephone visits (e.g. limits to patient confidentiality) that differ from in-person visits.?  Confidentiality still applies for telephone services, and nobody will record the visit.  It is important to be in a quiet, private space that is free of distractions (including cell phone or other devices) during the visit.??      If during the course of the call I believe a telephone visit is not appropriate, you will not be charged for this service\"     Consent has been obtained for this service by care team member: Yes    Patient is followed for history of right lower extremity chronic regional pain syndrome, lumbar radiculopathy, diffuse osteoarthritis, comorbid mood disorder.    Reviewing the record on 11/22 did have bilateral genicular nerve blocks with Dr. Mittal.    On interview she describes " have an incident where she lost her balance hit her face and her covers.  She wondered if this was due to the lisinopril affecting her balance.  She is significant bruising as she is on Coumadin.  I suggested she review this with her prescriber.    She was seen [facility] to address headaches, doing occipital nerve blocks which can be very helpful.  She can repeat them as needed for her migraines.    The radiofrequency ablations were very helpful.  They have been useful for her knee pain.  Now she wonders whether she could have this for her hips.  Describes in the winter her hip pain is much worse and has not had injections.    She has been taking the zonisamide 25 mg twice a day which she thinks also may be helping her migraines.    She continues with the fentanyl patch 75 mcg every 48 hours.  She feels this is giving acceptable relief with manageable side effects.   reviewed, as expected.  She is due for urine drug test, will wait until she comes in for the next injections due to Covid.        Current Outpatient Medications:      evolocumab 140 mg/mL PnIj, Inject 140 mg under the skin every 14 (fourteen) days. (Patient taking differently: Inject 140 mg under the skin every 14 (fourteen) days. Repatha), Disp: 6 mL, Rfl: 3     fentaNYL (DURAGESIC) 75 mcg/hr, Place 1 patch on the skin every other day. To start this RX on 1/3/21, Disp: 15 patch, Rfl: 0     lamoTRIgine (LAMICTAL) 100 MG tablet, Take 2 tablets (200 mg total) by mouth 2 (two) times a day. two twice a day, Disp: 360 tablet, Rfl: 2     levothyroxine (SYNTHROID, LEVOTHROID) 100 MCG tablet, Take 0.5 tablets (50 mcg total) by mouth daily., Disp: 45 tablet, Rfl: 3     lisinopriL (PRINIVIL,ZESTRIL) 20 MG tablet, Take 1 tablet (20 mg total) by mouth daily., Disp: 90 tablet, Rfl: 3     ondansetron (ZOFRAN) 4 MG tablet, Take 1 tablet (4 mg total) by mouth every 8 (eight) hours as needed for nausea., Disp: 30 tablet, Rfl: 2     rosuvastatin (CRESTOR) 40 MG  tablet, Take 1 tablet (40 mg total) by mouth at bedtime., Disp: 90 tablet, Rfl: 3     sodium phosphates 133 mL (FLEET ENEMA) 19-7 gram/118 mL Enem rectal enema, as needed. , Disp: , Rfl:      SUMAtriptan (IMITREX) 50 MG tablet, Take 1 tablet (50 mg total) by mouth every 2 (two) hours as needed for migraine., Disp: 9 tablet, Rfl: 5     traZODone (DESYREL) 100 MG tablet, Take 4 tablets (400 mg total) by mouth at bedtime. TAKE  4 TABLETS(400 MG) BY MOUTH AT BEDTIME AS NEEDED FOR SLEEP (Patient taking differently: Take 400 mg by mouth at bedtime. TAKE  4 TABLETS(400 MG) BY MOUTH AT BEDTIME AS NEEDED FOR SLEEP Patient is taking 3 tablets by mouth at HS), Disp: 360 tablet, Rfl: 1     warfarin ANTICOAGULANT (COUMADIN) 2.5 MG tablet, Take 1 tablet (2.5 mg total) by mouth See Admin Instructions. Take 2.5 to 7.5 mg daily, adjusted for INR, Disp: 90 tablet, Rfl: 3     warfarin ANTICOAGULANT (COUMADIN/JANTOVEN) 5 MG tablet, Take one and one half to two tablets (7.5 to 10 mg) by mouth daily. Adjust dose based on INR results as directed., Disp: 160 tablet, Rfl: 1     apixaban ANTICOAGULANT (ELIQUIS) 5 mg Tab tablet, Take 1 tablet (5 mg total) by mouth 2 (two) times a day., Disp: 180 tablet, Rfl: 3     coenzyme Q10 (CO Q-10) 100 mg capsule, Take 1 capsule (100 mg total) by mouth daily., Disp: 30 capsule, Rfl: 3     naloxone (NARCAN) 4 mg/actuation nasal spray, 1 spray (4 mg dose) into one nostril for opioid reversal. Call 911. May repeat if no response in 3 minutes., Disp: 1 Box, Rfl: 0     valACYclovir (VALTREX) 1000 MG tablet, TAKE ONE TABLET BY MOUTH THREE TIMES A DAY FOR 7 DAYS as needed for outbreaks, Disp: 42 tablet, Rfl: 2     zonisamide (ZONEGRAN) 25 MG capsule, Take 1 capsule (25 mg total) by mouth 3 (three) times a day., Disp: 270 capsule, Rfl: 2    Current Facility-Administered Medications:      teriparatide injection 20 mcg (FORTEO), 20 mcg, Subcutaneous, DAILY, Caroline Sumner NP    By telephone alert, clear  "sensorium.  Thought process logical.  Affect is much brighter than in the past.         Objective:     Vitals:    02/02/21 1244   Weight: 144 lb (65.3 kg)   Height: 5' 3\" (1.6 m)   PainSc:   6   PainLoc: Back       Assessments: For complaints of hip pain, with x-rays showing degenerative changes, will schedule with Dr. Mittal for joint injections.  Reviewed is difficult to access sensory nerves compared to the knees.    She will continue the fentanyl and zonisamide.    She review concerns about balance with lisinopril with her primary care provider.    Total time more than 15 minutes.          This note has been dictated using voice recognition software. Any grammatical or context distortions are unintentional and inherent to the software  "

## 2021-06-15 NOTE — TELEPHONE ENCOUNTER
Pt reports intermittent episodes of nausea and pain under left breast.  She adds with those symptoms comes lightheaded and dizziness, fatigue, and clammy sweats.  It takes her breath away and her heart starts to race.  Today her HR was 105 with an episode, normal is 60-80 for her.  Pt's neighbor also notes that pt becomes very pale when this happens.    Dr Ybarra - pt not due to see you until November, hasn't had any cardiac testing done since 2019.  Do you want any testing ordered?  Any other recommendations?  -lindy

## 2021-06-15 NOTE — PROGRESS NOTES
"Pt stopped at the desk after her appt with Brinda and complained that her B/P was elevated and she felt \"lousy\" despite increasing her Losartan. Was seen in ER 2 nights ago for HTZN and was told to double her Losartan dose. Stated her last Stellate of 2 days ago didn't help her pain. Feels her pain is causing her high B/P. Also complains of Rt hip and leg pain. States \"I think I have a cracked hip.\" Also states she's worried because she lives alone and all of her relatives are out of town for the weekend. At 1415-  B/P 203/88, pulse 65; at  1430 B/P  225/100, pulse 81, at 1510- B/P 194/91, pulse 63.   Encouraged pt to go to [facility] ER, but she didn't want to go there due to the long wait and all of the sick people. Stated she'd go to the walk in clinic [facility], but first wanted Dr Rees called to see if she has any suggestions. Pt called Dr Rees's clinic and call was transferred to the [facility]. Pt put nurse on the phone to explain her B/P and symptoms to [facility]. Pt decided to go to the walk in clinic in the end, and refused to sign out AMA. She denied dizziness or headache as she left the clinic. Color was pink, skin warm and dry.   "

## 2021-06-15 NOTE — PROGRESS NOTES
Patient states she in moving next week to the [facility] [place] which is independent living.   She has requested information regarding [employer] and Meals [employer] Wheels. Care [facility] mailed information.  She has rented out her home to a couple

## 2021-06-15 NOTE — PROGRESS NOTES
Assessment:         1. Essential hypertension  Basic Metabolic Panel    Basic Metabolic Panel    Ambulatory referral to Neurology    HM2(CBC w/o Differential)   2. Mixed hyperlipidemia     3. Chronic kidney disease (CKD) stage G3a/A1, moderately decreased glomerular filtration rate (GFR) between 45-59 mL/min/1.73 square meter and albuminuria creatinine ratio less than 30 mg/g     4. Hyperglycemia  Glycosylated Hemoglobin A1c   5. Cluster headaches  Ambulatory referral to Neurology   6. Head pain cephalgia  Ambulatory referral to Neurology          Plan:        Fasting labs were drawn. Blood pressure is under adequate control. We reviewed her current medications and she will continue the same pending additional lab results. We reviewed dietary recommendations, including low salt and high fiber diet, and recommendations for regular exercise/activity. I encouraged her to continue monitoring the BP carefully and I would like to add a low dose of spironolactone to try to keep her BP under better control. We chose that given her sulfa allergy and possible intolerance of hctz in the past. She should return to clinic for repeat BMP in the next few weeks to rule out hyperkalemia. I have also placed a referral to neurology for her ongoing head pain symptoms. She will plan to follow up in 4-6 mos for repeat fasting labs and med check, sooner if any difficulties.         Subjective:      Fasting today? Yes    Hyperlipidemia & Hypertension      Patient is here for follow-up of hypertension and dyslipidemia. A repeat fasting lipid profile was done. Compliance with treatment has been good. Patient denies muscle pain associated with her medications. She was recently noted to have very high BP at pain clinic when she was there to get acupuncture for head pain. Current symptoms: none. Patient denies chest pain, dyspnea, exertional chest pressure/discomfort, irregular heart beat, lower extremity edema and near-syncope. Previous  "history of cardiac disease includes: none. She takes losartan and reports that she takes 25 mg if 120 syst, will take up to 100 mg if BP is high. She doesn't hold the medication if the BP is too low but takes a smaller dose. Sulfa allergy - thinks she's been on hctz and was taken off, but she;s not certain.       She has a history of chronic pain with RSD and she sees pain clinic regularly for management of that. Recently she has been having pain in the left side of her head and it feels like its going to be a migraine or cluster HA. She takes imitrex prn. Difficulty sleeping on/off. RSD, pain keeping her awake. She has a history off failed back surgery and OA bilateral knees as well.       The following portions of the patient's history were reviewed and updated as appropriate: allergies, current medications, past family history, past medical history, past social history, past surgical history and problem list.    Review of Systems  Pertinent items are noted in HPI.        Objective:         Vitals:    01/26/18 1000   BP: 115/60   Pulse: 96   Temp: 98.4  F (36.9  C)   SpO2: 97%   Weight: 157 lb (71.2 kg)   Height: 5' 2.5\" (1.588 m)       General:    Alert, cooperative, no distress   Head:    Normocephalic, without obvious abnormality, atraumatic   Eyes:    PERRL, conjunctiva/corneas clear, EOM's intact    Ears:    Normal TM's and external ear canals, both ears   Nose:   Nares normal, mucosa normal, no drainage or sinus tenderness   Throat:   Lips, mucosa, and tongue normal; teeth and gums normal   Neck:   Supple, symmetrical,  no adenopathy;  thyroid:  normal   Back:     Symmetric, ROM normal, no CVA tenderness   Lungs:     Clear to auscultation bilaterally, respirations unlabored   CV:    Regular rate and rhythm   Abdomen:     Soft, non-tender, no masses, no organomegaly   Extremities:   Extremities normal, atraumatic, no cyanosis or edema   Pulses:   2+ and symmetric all extremities   Skin:   Skin color, texture, " turgor normal, no rashes or lesions   Neurologic:   normal strength and tone throughout         Results for orders placed or performed in visit on 01/26/18   Basic Metabolic Panel   Result Value Ref Range    Sodium 137 136 - 145 mmol/L    Potassium 4.3 3.5 - 5.0 mmol/L    Chloride 103 98 - 107 mmol/L    CO2 21 (L) 22 - 31 mmol/L    Anion Gap, Calculation 13 5 - 18 mmol/L    Glucose 94 70 - 125 mg/dL    Calcium 9.2 8.5 - 10.5 mg/dL    BUN 22 8 - 28 mg/dL    Creatinine 0.97 0.60 - 1.10 mg/dL    GFR MDRD Af Amer >60 >60 mL/min/1.73m2    GFR MDRD Non Af Amer 56 (L) >60 mL/min/1.73m2   Glycosylated Hemoglobin A1c   Result Value Ref Range    Hemoglobin A1c 5.2 3.5 - 6.0 %   HM2(CBC w/o Differential)   Result Value Ref Range    WBC 6.3 4.0 - 11.0 thou/uL    RBC 4.46 3.80 - 5.40 mill/uL    Hemoglobin 13.9 12.0 - 16.0 g/dL    Hematocrit 41.6 35.0 - 47.0 %    MCV 93 80 - 100 fL    MCH 31.0 27.0 - 34.0 pg    MCHC 33.3 32.0 - 36.0 g/dL    RDW 13.1 11.0 - 14.5 %    Platelets 217 140 - 440 thou/uL    MPV 7.3 7.0 - 10.0 fL

## 2021-06-15 NOTE — PROGRESS NOTES
Mental Health Visit Note    1/5/2018  Start time: 1300    Stop Time: 1350   Session # 1    Sandy Spencer is a 77 y.o. female is being seen today for    Chief Complaint   Patient presents with     Mental Health Visit   .     New symptoms or complaints: None    Functional Impairment:   Personal: 4  Family: 3  Work: NA  Social:4    Clinical assessment of mental status: Patient presented alert and oriented x3.  She was well-groomed.  Her attire was appropriate.  Patient was cooperative.  Patient motor actively was normal and eye contact appropriate.  Patients mood was anxious and affect congruent.  Patient s speech and language was normal.  Patient attention and thought process normal.  Concentration was appropriate.  Patient denied delusions or hallucinations. Patient denied suicidal or homicidal ideation.  Patient denied any long or short term memory problems. No evidence of impairment in judgment.She has insight and fund of knowledge was adequate.        Suicidal/Homicidal Ideation present: None Reported This Session    Patient's impression of their current status: Pt reporting ongoing symptoms of depression, anxiety, PTSD and chronic pain.     Therapist impression of patients current state: Pt reports she was recently in the hospital, discussed current health concerns and anxiety symptoms.  Pt anxious to get EMDR going, discussed the process and importance of preparing for EMDR.  Reviewed the Window of Tolerance and importance of developing a larger sustainable window of tolerance prior to the processing phase.  Pt is reading EMDR book and states its helpful to understand process.  Discussed Calm Place exercise and showed her the bilateral tappers.  Did not do a Calm Place exercise at this session.  Discussed family dynamics and current coping skills.  Treatment plan has been developed, need to finish typing up and sign at next session.     Type of psychotherapeutic technique provided: Insight oriented, Client  centered, Solution-focused, CBT and EMDR    Progress toward short term goals:EMDR education, PTSD education, calm place exercise, coping skills, stress management, self-care    Review of long term goals: decrease symptoms of depression, anxiety, PTSD and chronic pain    Diagnosis:   1. Severe recurrent major depression without psychotic features    2. Chronic post-traumatic stress disorder (PTSD)    3. Generalized anxiety disorder    4. Chronic pain syndrome        Plan and Follow up: Complete CALM Place exercise at next session  Continue with Resourcing strategies to increase window of tolerance  Follow up with Brinda in 1 week  Follow up with all providers as scheduled.       Discharge Criteria/Planning: Patient will continue with follow-up until therapy can be discontinued without return of signs and symptoms.    Ricarda Burns 1/8/2018      This note was created with help of Dragon dictation software. Grammatical / typing errors are not intentional and inherent to the software.

## 2021-06-15 NOTE — PROGRESS NOTES
Mental Health Visit Note    12/28/2017  Start time: 1200    Stop Time: 1250   Session # 3    Sandy Spencer is a 77 y.o. female is being seen today for    Chief Complaint   Patient presents with     Mental Health Visit   .     New symptoms or complaints: None    Functional Impairment:   Personal: 4  Family: 4  Work: NA  Social:4    Clinical assessment of mental status: Patient presented alert and oriented x3.  She was well-groomed.  Her attire was appropriate.  Patient was cooperative.  Patient motor actively was normal and eye contact appropriate.  Patients mood was anxious and affect congruent.  Patient s speech and language was normal.  Patient attention and thought process normal.  Concentration was appropriate.  Patient denied delusions or hallucinations. Patient denied suicidal or homicidal ideation.  Patient denied any long or short term memory problems. No evidence of impairment in judgment.    She has insight and fund of knowledge was adequate.        Suicidal/Homicidal Ideation present: None Reported This Session    Patient's impression of their current status: Pt reports symptoms of depression, anxiety, psychosocial stressors, PTSD that are impacting daily functioning.     Therapist impression of patients current state: Processed patients holiday and psychosocial stressors related to the relationship with her and her children/grandchildren.  Discussed family history, trauma hx and the impact of these stressors on both her mental and physical health.  Pt is reading book on EMDR and reports ongoing interest in the process.  Also encouraged her to read book Borderline Personality Disorder Demystified as it could related to her relationship with children.  We will complete the treatment plan at next session.     Type of psychotherapeutic technique provided: Insight oriented, Client centered, Solution-focused, CBT and Education on EMDR    Progress toward short term goals:coping skills, PTSD/emdr education,  stress management, communication, pacing, self-care, meaningful activity    Review of long term goals: Decrease symptoms of depression, anxiety, PTSD and chronic apin.    Diagnosis:   1. Severe recurrent major depression without psychotic features    2. Chronic post-traumatic stress disorder (PTSD)    3. Generalized anxiety disorder    4. Chronic pain syndrome        Plan and Follow up: Continue to read books on EMDR & BPD   Follow up with Brinda in 1-2 weeks  Follow up with all providers as scheduled        Discharge Criteria/Planning: Patient will continue with follow-up until therapy can be discontinued without return of signs and symptoms.    Ricarda Burns 1/4/2018      This note was created with help of Dragon dictation software. Grammatical / typing errors are not intentional and inherent to the software.

## 2021-06-15 NOTE — PROGRESS NOTES
Outpatient Mental Health Treatment Plan    Name:  Sandy Spencer  :  1940  MRN:  [id number]    Treatment Plan:  Initial Treatment Plan  Intake/initial treatment plan date:  2018  Benefit and risks and alternatives have been discussed: Yes  Is this treatment appropriate with minimal intrusion/restrictions: Yes  Estimated duration of treatment:  Approximately 26+ sessions.  Anticipated frequency of services:  Every 1 weeks  Necessity for frequency: This frequency is needed to establish therapeutic goals and for continuity of care in order to monitor progress.  Necessity for treatment: To address cognitive, behavioral, and/or emotional barriers in order to work toward goals and to improve quality of life.    Plan:           ?   ? Anxiety/PTSD    Goal:  Decrease average anxiety level from 15 to 5 or below.   Strategies: ? [x]Learn and practice relaxation techniques and other coping  strategies (e.g., thought stopping, reframing, meditation)     ? [x] Increase involvement in meaningful activities     ? [x] Discuss sleep hygiene     ? [x] Explore thoughts and expectations about self and others     ? [x] Identify and monitor triggers for panic/anxiety symptoms     ? [x] Implement physical activity routine (with physician approval)     ? [x] Consider introduction of bibliotherapy and/or videos     ? [x] Continue compliance with medical treatment plan (or explore barriers)                                       [x] Understanding PTSD, causes, symptoms and treatment     -  EMDR education & possible therapy strategy     - Communication skills     - Stress management techniques     - increase support system    ?Degree to which this is a problem: 4  Degree to which goal is met: 0  Date of Review:        ? Depression    Goal:  Decrease average depression level from 18 to 5 or below.   Strategies:    ?[x] Decrease social isolation     [x] Increase involvement in meaningful activities     ?[x] Discuss sleep  hygiene     ?[x] Explore thoughts and expectations about self and others     ?[x] Process grief (loss of significant person, independence, role,etc.)     ?[x] Assess for suicide risk     ?[x] Implement physical activity routine (with physician approval)     [x] Consider introduction of bibliotherapy and/or videos     [x] Continue compliance with medical treatment plan (or explore  barriers)       ?    Degree to which this is a problem: 4  Degree to which goal is met: 0  Date of Review:     Chronic pain  Goal:  ? Decrease average pain level from 9/10 to 6/10.   ? Increase % acceptance of pain from 40%  to 60%  .   Strategies: ? [x] Explore thoughts and expectations about self and others         [x] Explore emotional reactions to illness/injury      ? [x] Learn and practice relaxation techniques and other coping  strategies            [x] Implement physical activity routine (with physician approval)     ? [x] Engage in values clarification and goal-setting     ? [x] Consider introduction of bibliotherapy and/or videos     ? [x] Increase involvement in meaningful activities     ? [x] Discuss sleep hygiene     ? [x] Process grief (loss of significant person, independence, role, etc.)     ? [x] Assess for suicide risk  ?    Degree to which this is a problem: 4  Degree to which goal is met: 0  Date of Review:        Functional Impairment:  1=Not at all/Rarely  2=Some days  3=Most Days  4=Every Day    Personal : 4  Family : 4  Social : 4   Work/school : NA    Diagnosis:  Major Depression, recurrent, severe  Generalized Anxiety Disorder  PTSD  Chronic Pain syndrome    WHODAS: 41% H1: 20, H2: 3, H3: 15        Clinical assessments and measures completed:. KT-7, PHQ-9, CAGE-AID and PANSI     Strengths:  Intelligent, motivated, honest, open  Limitations:  lack of support system, chronic pain  Cultural Considerations: pt is a 77 year old,  female with hx of chronic pain    Persons responsible for this plan: Patient and  Provider            Psychotherapist Signature           Patient Signature:              Guardian Signature             Provider: Performed and documented by ARY Hector   Date:  1/8/2018      This note was created with help of Dragon dictation software.  Grammatical / typing errors are not intentional and inherent to the software.
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
## 2021-06-15 NOTE — TELEPHONE ENCOUNTER
----- Message from Mary Dumas sent at 2/22/2021 12:35 PM CST -----  General phone call:  PATIENT IS HAVING SPELLS  IF SHE IS DOING SOMETHING FOR 10 MINUTES SHE STARTS HAVING SYMPTOMS OF NAUSEA, ELEVATED PULSE, SWEATING, LIGHT HEADED, DIZZY, shortness of breath, PINCH UNDER HER LEFT BREAST.  PLEASE CALL    Caller: PATIENT  Primary cardiologist: DR CANO  Detailed reason for call: SEE ABOVE  New or active symptoms? YES, SEE ABOVE  Best phone number: [phone]  Best time to contact: ANY TIME  Ok to leave a detailed message? YES  Device? NO    Additional Info:

## 2021-06-15 NOTE — TELEPHONE ENCOUNTER
----- Message -----  From: Luz Elena Ybarra MD  Sent: 2/22/2021   5:07 PM CST  To: Rufina Montenegro RN    Please schedule her to have a nuclear stress test as soon as possible.  Thanks  Wilbert  ------------------------------------------------------------------    Recommendations discussed with pt.  Pt verbalized understanding and had no questions.  NM Exercise stress test ordered, call transferred to scheduling.  -linda

## 2021-06-15 NOTE — PROGRESS NOTES
Kerry had been seen here recently for anesthetic intervention, had significant blood pressure.  By the time she went to urgent care it did return to 140/80.  They also did x-rays of her knees showing degenerative changes.    On interview she reviews in February moving to a new 1 level living arrangement and is renting out her condo.    She believes her blood pressure is labile related to her pain more than anything else.  She wonders what she can take when there are flareups with her CRPS.  She is using the fentanyl 50 mcg.  She does not want to increase this.  She recalls she had used morphine in the past with a $1 co-pay.  She gets concern as we discuss other agents such as oxytocin and the cost.  She felt ketamine was not particularly helpful.    She has not heard back from [facility] about a bowel program.  She did find however after her blood pressure resolved, had not had a bowel movement in 5 days and did get some relief.    She is concern for the cost of medical marijuana but would like to explore that in future    Reviews concerns with her knee pain.  We discussed geniculate blocks to be something is a consideration.    She asked about BuSpar which had been on her medication list but has not been continued.  She was using 15 mg twice a day might of been helpful from her primary care provider.  We will resume this.      Current Outpatient Prescriptions:      alendronate (FOSAMAX) 70 MG tablet, Take 1 tablet (70 mg total) by mouth every 7 days., Disp: 12 tablet, Rfl: 3     aspirin 81 MG EC tablet, Take 81 mg by mouth daily., Disp: , Rfl:      atorvastatin (LIPITOR) 40 MG tablet, Take 1 tablet (40 mg total) by mouth at bedtime., Disp: 90 tablet, Rfl: 3     azelastine (ASTEPRO) 0.15 % (205.5 mcg) Spry nasal spray, 1 spray by Left Nare route 2 (two) times a day as needed., Disp: , Rfl:      busPIRone (BUSPAR) 15 MG tablet, Take 1 tablet (15 mg total) by mouth 2 (two) times a day., Disp: 180 tablet, Rfl: 1      "cholecalciferol, vitamin D3, 5,000 unit Tab, Take 1 tablet by mouth daily., Disp: , Rfl:      diazePAM (VALIUM) 5 MG tablet, Take 5 mg by mouth every 6 (six) hours as needed for anxiety (took dose before injection)., Disp: , Rfl:      diclofenac sodium (VOLTAREN) 1 % Gel, Apply twice a day as needed, Disp: 100 g, Rfl: 2     diphenhydrAMINE (BENADRYL) 25 mg capsule, Take 25 mg by mouth every 6 (six) hours as needed. , Disp: , Rfl:      fentaNYL (DURAGESIC) 50 mcg/hr, Place 1 patch on the skin every third day., Disp: 10 patch, Rfl: 0     furosemide (LASIX) 40 MG tablet, Take 0.5-1 tablets (20-40 mg total) by mouth daily as needed., Disp: 90 tablet, Rfl: 1     levothyroxine (SYNTHROID, LEVOTHROID) 50 MCG tablet, Take 1 tablet (50 mcg total) by mouth daily., Disp: 90 tablet, Rfl: 1     losartan (COZAAR) 25 MG tablet, Take 1 tablet (25 mg total) by mouth 2 (two) times a day., Disp: 180 tablet, Rfl: 1     morphine (MSIR) 15 MG tablet, Take 1 tablet (15 mg total) by mouth 3 (three) times a day as needed for pain., Disp: 20 tablet, Rfl: 0     naloxone (NARCAN) 4 mg/actuation nasal spray, 1 spray (4 mg dose) into one nostril for opioid reversal. Call 911. May repeat if no response in 3 minutes., Disp: 1 Box, Rfl: 0     OMEGA-3/DHA/EPA/FISH OIL (FISH OIL-OMEGA-3 FATTY ACIDS) 300-1,000 mg capsule, Take 1.2 g by mouth 2 (two) times a day., Disp: , Rfl:      psyllium (METAMUCIL) 3.4 gram packet, Take 1 packet by mouth 2 (two) times a day., Disp: , Rfl: 0     SUMAtriptan (IMITREX) 50 MG tablet, Take 1 tablet (50 mg total) by mouth every 2 (two) hours as needed for migraine., Disp: 27 tablet, Rfl: 1     traZODone (DESYREL) 100 MG tablet, Take 2-4 tablets (200-400 mg total) by mouth at bedtime., Disp: 360 tablet, Rfl: 1     verapamil (CALAN-SR) 240 MG CR tablet, Take 1 tablet (240 mg total) by mouth at bedtime., Disp: 90 tablet, Rfl: 1  Blood pressure 125/60, pulse 78, resp. rate 16, height 5' 2.5\" (1.588 m), weight 163 lb (73.9 " kg), not currently breastfeeding.     Pain score 8.  She is alert with a clear sensorium good eye contact thought process tight logical.  No evidence of pain behavior today.    Patient chronic pain relates to lumbar degenerative changes, right lower extremity chronic regional pain syndrome, migraine headaches, diffuse degenerative changes.  She has had labile blood pressure around her pain complaints.    Plan we will continue with fentanyl 50 mcg patches every 3 days.    We will use morphine 15 mg immediate release every 8 hours as needed for flareups.  Caution constipation.    We discussed the use of oxycodone as another agent.  When she is settled as she has rented on her condyle believes she may be able to pursue that.    She may have had discussions for geniculate blocks for her knees, we will make referrals that regard.    Time spent 25 mg face-to-face more than 50% counseling about above condition and coordination treatment plan

## 2021-06-15 NOTE — TELEPHONE ENCOUNTER
Who is calling:  Sandy  Reason for Call:  Patient called stating that she just received her Eliquis and she would like to know when she can start taking it.  Date of last appointment with primary care: 2/9/2021  Okay to leave a detailed message: Yes

## 2021-06-15 NOTE — PROGRESS NOTES
Assessment:     1. Chronic hip pain  XR Hip Left 2 or More VWS    XR Hip Left 2 or More VWS   2. Right leg pain  XR Tibia and Fibula Right    XR Tibia and Fibula Right       Xr Hip Left 2 Or More Vws    Result Date: 1/5/2018  EXAM DATE:         01/05/2018 2 VIEWS RIGHT HIP 01/05/2018 INDICATION: Right hip pain COMPARISON: None REPORT: Both hips are negative for fracture or dislocation. Mild degenerative change of both hip joints. Chronic appearing enthesitis at the muscular attachments of the iliac crests and spines and left greater trochanter. Pelvis otherwise negative.     Xr Tibia And Fibula Right    Result Date: 1/5/2018  EXAM DATE:         01/05/2018 [facility] RADIOLOGY [place] X-RAY TIBIA FIBULA RIGHT, AP AND LATERAL 1/5/2018 4:45 PM INDICATION: Pain COMPARISON: None. FINDINGS: Degenerative change at the medial compartment of the knee joint. The right tibia and fibula are negative for fracture.        Plan:   -When patient was checked for her blood pressure it was 140/80.  No further action needed.  She is to follow-up with her primary care doctor if she continues to have elevated blood pressure.  She is aware if she becomes symptomatic that include occipital headaches vision problems and lightheadedness she should go to emergency room.  Patient verbalized understanding.  For the right hip pain and right lateral below the knee pain, x-ray was done and we discussed the results.  Patient is to follow-up with PCP for further evaluation and treatment.      Subjective:       77 y.o. female presents for evaluation pt here for elevated blood pressure, head pain.  Had elevated bp 203/88, 225/100, 194/91.  Has started Losartan 50 mg BID patient started today according to the note from pain clinic RN.  She reports chronic sinus headache.  Denies vision problems or any other symptoms.  Patient reports that she has blood pressure machine at home and she takes her blood pressure prior to taking her medication.  She  reports that she takes losartan 25 mg 2 tablets if her blood pressures greater than 160.  If her blood pressure is less than 120 she does not take her medication dose for the day.  And if it is less than 160 she takes 1 tablet.  She cannot remember what her blood pressure was this morning but she took 2 tablets of the 25 mg of losartan.  She also reports that she take 240 mg of verapamil at night.    Patient also reports right hip pain and right leg pain.  She reports that she fell November 2014 and she has never had an x-ray.  She reports that her pain is 9 out of 10 but she is not asking for any pain medications.      The following portions of the patient's history were reviewed and updated as appropriate: allergies, current medications, past family history, past medical history, past social history, past surgical history and problem list.    Review of Systems  A 12 point comprehensive review of systems was negative except as noted.     Objective:      /80 (Patient Site: Left Arm, Patient Position: Sitting, Cuff Size: Adult Regular)  Pulse 69  Temp 98.9  F (37.2  C) (Oral)   Resp 12  Wt 163 lb 4.8 oz (74.1 kg)  SpO2 98%  Breastfeeding? No  BMI 29.39 kg/m2  General appearance: alert, appears stated age, cooperative and no distress  Head: Normocephalic, without obvious abnormality, atraumatic  Eyes: conjunctivae/corneas clear. PERRL, EOM's intact. Fundi benign.  Ears: normal TM's and external ear canals both ears  Nose: Nares normal. Septum midline. Mucosa normal. No drainage or sinus tenderness.  Lungs: clear to auscultation bilaterally  Heart: regular rate and rhythm, S1, S2 normal, no murmur, click, rub or gallop  Extremities: right hip pain with SLR, right lower extremity lateral side of the knww with a buldge.  Firm to touch no pain with palpation to that area.    Skin: Skin color, texture, turgor normal. No rashes or lesions  Neurologic: Grossly normal     This note has been dictated using voice  recognition software. Any grammatical or context distortions are unintentional and inherent to the software

## 2021-06-15 NOTE — TELEPHONE ENCOUNTER
Anticoagulation Management    Unable to reach Sandy today.    Today's INR result of 2.40 is Therapeutic (goal INR of 2.0-3.0).     Follow up required to confirm doses taken and assess for changes (no template).    Left message to continue current dose of warfarin 7.5 mg tonight.     ACN to follow up    Artie Sterling RN

## 2021-06-16 ENCOUNTER — HOSPITAL ENCOUNTER (OUTPATIENT)
Dept: MAMMOGRAPHY | Facility: CLINIC | Age: 81
Discharge: HOME | End: 2021-06-16
Attending: FAMILY MEDICINE
Payer: MEDICARE

## 2021-06-16 DIAGNOSIS — Z12.31 VISIT FOR SCREENING MAMMOGRAM: ICD-10-CM

## 2021-06-16 PROBLEM — K63.3: Status: ACTIVE | Noted: 2019-12-15

## 2021-06-16 PROBLEM — I65.23 BILATERAL CAROTID ARTERY STENOSIS: Status: ACTIVE | Noted: 2018-07-26

## 2021-06-16 PROBLEM — Z90.49 HISTORY OF PARTIAL COLECTOMY: Status: ACTIVE | Noted: 2019-12-15

## 2021-06-16 PROBLEM — F32.1 MODERATE MAJOR DEPRESSION (H): Status: ACTIVE | Noted: 2019-09-03

## 2021-06-16 PROBLEM — R10.9 FLANK PAIN: Status: ACTIVE | Noted: 2020-05-01

## 2021-06-16 PROBLEM — H02.836: Status: ACTIVE | Noted: 2017-12-08

## 2021-06-16 PROBLEM — R82.991 HYPOCITRATURIA: Status: ACTIVE | Noted: 2020-07-23

## 2021-06-16 PROBLEM — N13.30 HYDRONEPHROSIS: Status: ACTIVE | Noted: 2019-02-13

## 2021-06-16 PROBLEM — R10.84 ABDOMINAL PAIN, GENERALIZED: Status: ACTIVE | Noted: 2017-10-10

## 2021-06-16 PROBLEM — M62.81 GENERALIZED MUSCLE WEAKNESS: Chronic | Status: ACTIVE | Noted: 2019-03-03

## 2021-06-16 PROBLEM — K83.1: Status: ACTIVE | Noted: 2017-02-08

## 2021-06-16 PROBLEM — Z90.49 ACQUIRED ABSENCE OF OTHER SPECIFIED PARTS OF DIGESTIVE TRACT: Status: ACTIVE | Noted: 2019-12-15

## 2021-06-16 PROBLEM — K21.9 GASTROESOPHAGEAL REFLUX DISEASE WITHOUT ESOPHAGITIS: Status: ACTIVE | Noted: 2017-05-02

## 2021-06-16 PROBLEM — M17.0 PRIMARY OSTEOARTHRITIS OF BOTH KNEES: Status: ACTIVE | Noted: 2020-12-03

## 2021-06-16 PROBLEM — F11.20 OPIOID TYPE DEPENDENCE, CONTINUOUS (H): Status: ACTIVE | Noted: 2019-01-26

## 2021-06-16 PROBLEM — R60.9 EDEMA: Status: ACTIVE | Noted: 2019-12-15

## 2021-06-16 PROBLEM — K83.1 OBSTRUCTION OF COMMON BILE DUCT (H): Status: ACTIVE | Noted: 2017-02-08

## 2021-06-16 PROBLEM — E83.30 DISORDER OF PHOSPHORUS METABOLISM: Status: ACTIVE | Noted: 2019-12-15

## 2021-06-16 PROBLEM — R19.7 OVERFLOW DIARRHEA: Status: ACTIVE | Noted: 2019-12-15

## 2021-06-16 PROBLEM — R14.0 ABDOMINAL BLOATING: Status: ACTIVE | Noted: 2019-12-15

## 2021-06-16 PROBLEM — D50.9 IRON DEFICIENCY ANEMIA: Status: ACTIVE | Noted: 2020-12-11

## 2021-06-16 PROBLEM — I27.82 OTHER CHRONIC PULMONARY EMBOLISM WITHOUT ACUTE COR PULMONALE (H): Status: ACTIVE | Noted: 2019-01-26

## 2021-06-16 PROBLEM — M79.18 MYOFASCIAL PAIN: Status: ACTIVE | Noted: 2019-08-13

## 2021-06-16 PROBLEM — I25.10 CORONARY ARTERY DISEASE INVOLVING NATIVE CORONARY ARTERY OF NATIVE HEART WITHOUT ANGINA PECTORIS: Status: ACTIVE | Noted: 2018-09-25

## 2021-06-16 NOTE — PATIENT INSTRUCTIONS - HE
PLAN:  Discussed frequency specific microcurrent to help with your concussion, may contact transitions at [phone].    You will provide a urine sample for urine drug test at your clinic appointment on Friday with your primary care provider.    Continue with the fentanyl 75 mcg patch every 48 hours.    Follow-up with Dr. Lynn in 8 weeks

## 2021-06-16 NOTE — TELEPHONE ENCOUNTER
Completed Covering provider 3/16-3/26 - pt needs to schedule follow up    Requested Prescriptions     Signed Prescriptions Disp Refills     fentaNYL (DURAGESIC) 75 mcg/hr 5 patch 0     Sig: Place 1 patch on the skin every other day for 10 days.     Authorizing Provider: TRENT WATSON       Please reach out to scheduled follow up

## 2021-06-16 NOTE — PROGRESS NOTES
Assessment & Plan     Closed fracture of right hand with routine healing, subsequent encounter  -Pain is tolerable, referral for OT placed for her. She will work with OT and PT on this, discuss the brace with [place] as well as it is rubbing and will f/u with me as needed  - Ambulatory referral to Occupational Therapy  - Ambulatory referral to PT/OT    Closed nondisplaced fracture of styloid process of right radius with routine healing, subsequent encounter  -Thankfully the pain is stable, per above  - Ambulatory referral to Occupational Therapy  - Ambulatory referral to PT/OT    Concussion with loss of consciousness, subsequent encounter  -Continue with rest and let me know if not improving.     Dry eyes  -referral to the eye clinic discussed with the patient. She will f/u with them and use drops for now    Personal history of urinary tract infection  -repeat UA to make sure infection has cleared  - Urinalysis Macroscopic    Abnormal urinalysis  - Culture, Urine    Essential hypertension  -BP under good control, continue on current medication  - lisinopriL (PRINIVIL,ZESTRIL) 40 MG tablet  Dispense: 45 tablet; Refill: 1    Other chronic pulmonary embolism without acute cor pulmonale (H)  -Doing well on Eliquis rather than the warfarin at this time    Opioid type dependence, continuous (H)  -following with pain clinic    Moderate major depression (H)  -Stable symptoms.            Return in about 4 weeks (around 4/20/2021) for Next scheduled follow up.    Caitlyn Rees MD  [facility]   Sandy Spencer is 80 y.o. and presents today for the following health issues   HPI     She is 11 days out from her fall. She is not on medication for her osteopenia. The injection was over $2000 with each injection.     She did get a concussion, went back in two days after when her blood pressure was over 200. She hasn't been taking her blood pressure ntil after the medication  is in her system for over one hour. She will sit after she takes her medication.     She does have a bladder infection as well. She does think that this has been two months at least. She is over with the medication .     She saw PT yesterday and he worked on her head the whole time. She tripped at the hospital on a rise and then the curb. She landed on her bilateral palms with hands outstretched. She fell harder on her right hand and both knees. She did have bruising on her right forehead and right chin. She did have cuts on her eyelid on the right as well. She does have medication for the nausea.     With this, she does have nausea and fatigue since the concussino. She has been doing limited television, has been resting. She does have     She does still have grit in her eyes on the right especially. She did have a stye as well on this side. She does wonder if she needs to see an eye doctor.     She is not spellling corectly, hand writing is not the same. She does have a pinch under the breast and she does have pressure in her chest as well.     Her blood pressures have been in the 160 range.     She is doing well with the Eliquis. She is doing well with the transition.       Review of Systems  Per above      Objective    /68 (Patient Site: Left Arm, Patient Position: Sitting, Cuff Size: Adult Regular)   Pulse 83   Wt 141 lb 4.8 oz (64.1 kg)   SpO2 98%   Breastfeeding No   BMI 25.03 kg/m    Body mass index is 25.03 kg/m .  Physical Exam  GENERAL APPEARANCE: A&A, NAD, well hydrated, well nourished  SKIN:  Normal skin turgor, no lesions/rashes   NECK: No LAD  CV: RRR, no M/G/R   LUNGS: CTAB  EXTREMITY: no edema, right wrist with tenderness over the fifth metacarpal, fingers are moving appropriately, no tenderness over the radial styloid however. Minimal swelling, some erythema where the brace is hitting at the base of the hand medially    NEURO: no gross deficits, mentation seems appropriate  PSYCH: normal  affect

## 2021-06-16 NOTE — PROGRESS NOTES
Dear Dr. Caitlyn Rees MD  [facility]  [address] [address],    Thank you for the opportunity to participate in the care of Sandy Spencer.     She is a 77 y.o.  female patient who comes to the sleep medicine clinic for further review of her sleep study. The study was completed on 11/15/17 which showed the the patient had mild snoring but does not have obstructive sleep apnea.  The patient was found to also have periodic limb movement sleep.  There was insufficient amount of sleep noted on the night of the study and the patient did not hit stage REM at all.    Current Outpatient Prescriptions   Medication Sig Dispense Refill     aspirin 81 MG EC tablet Take 81 mg by mouth daily.       atorvastatin (LIPITOR) 40 MG tablet Take 1 tablet (40 mg total) by mouth at bedtime. 90 tablet 3     azelastine (ASTEPRO) 0.15 % (205.5 mcg) Spry nasal spray 1 spray by Left Nare route 2 (two) times a day as needed.       cholecalciferol, vitamin D3, 5,000 unit Tab Take 1 tablet by mouth daily.       diazePAM (VALIUM) 5 MG tablet Take 5 mg by mouth every 6 (six) hours as needed for anxiety (took dose before injection).       diclofenac sodium (VOLTAREN) 1 % Gel Apply twice a day as needed 100 g 2     diphenhydrAMINE (BENADRYL) 25 mg capsule Take 25 mg by mouth every 6 (six) hours as needed.        eszopiclone (LUNESTA) 1 MG Tab Take 0.5-1 tablets (0.5-1 mg total) by mouth at bedtime. Take immediately before bedtime 30 tablet 1     fentaNYL (DURAGESIC) 50 mcg/hr Place 1 patch on the skin every third day. 10 patch 0     levothyroxine (SYNTHROID, LEVOTHROID) 50 MCG tablet Take 1 tablet (50 mcg total) by mouth daily. 90 tablet 1     losartan (COZAAR) 25 MG tablet Take 1 tablet (25 mg total) by mouth 2 (two) times a day. 180 tablet 1     naloxone (NARCAN) 4 mg/actuation nasal spray 1 spray (4 mg dose) into one nostril for opioid reversal. Call 911. May repeat if no response in 3 minutes. 1 Box 0      nortriptyline (PAMELOR) 10 MG capsule One bedtime for three night, 2 bed for 3 nights, 3 bed for 3 nights then four bedtime 60 capsule 1     OMEGA-3/DHA/EPA/FISH OIL (FISH OIL-OMEGA-3 FATTY ACIDS) 300-1,000 mg capsule Take 1.2 g by mouth 2 (two) times a day.       psyllium (METAMUCIL) 3.4 gram packet Take 1 packet by mouth 2 (two) times a day.  0     spironolactone (ALDACTONE) 25 MG tablet Take 1 tablet (25 mg total) by mouth daily. Start 1/2 tab for first week and increase to full tab as tolerated 30 tablet 2     SUMAtriptan (IMITREX) 50 MG tablet Take 1 tablet (50 mg total) by mouth every 2 (two) hours as needed for migraine. 27 tablet 1     traZODone (DESYREL) 100 MG tablet TAKE 2 TO 4 TABLETS(200  MG) BY MOUTH AT BEDTIME 360 tablet 0     verapamil (CALAN-SR) 240 MG CR tablet Take 1 tablet (240 mg total) by mouth at bedtime. 90 tablet 1     Current Facility-Administered Medications   Medication Dose Route Frequency Provider Last Rate Last Dose     denosumab 60 mg (PROLIA 60 mg/ml)  60 mg Subcutaneous Q6 Months Andrei Olivera MD   60 mg at 02/09/18 1505       Allergies   Allergen Reactions     Campral [Acamprosate]      Nausea, vomiting and headache     Cymbalta [Duloxetine] Anaphylaxis     Throat closes     Lyrica [Pregabalin] Anaphylaxis     Throat closes     Sulfa (Sulfonamide Antibiotics) Anaphylaxis            Lamotrigine Other (See Comments) and Dizziness     Fatigue     Amiloride Unknown     unknown     Amoxicillin      Aspirin Other (See Comments)     Stomach , 10/5/17 takes 81 mg at home     Augmentin [Amoxicillin-Pot Clavulanate] Diarrhea and Nausea And Vomiting     Chromium And Derivatives Other (See Comments)     Staples: Reaction to all metals.States serious reactions after surgery      Cortisone      Overuse with a lot of injections, recommended to try something else if needed due to osteopenia     Depakote [Divalproex]      rash     Flexeril [Cyclobenzaprine] Other (See Comments)      "Mouth ulcers     Labetalol Unknown     Metoprolol Unknown     Mirtazapine Other (See Comments)     Troubles catching breath.     Nsaids (Non-Steroidal Anti-Inflammatory Drug) Other (See Comments)     H/o ulcers     Other Allergy (See Comments) Unknown     SURGICAL STAPLES  STEROIDS (was told not to take because of concern of RE CHF)  SSRI's  Metal Staples     Other Drug Allergy (See Comments)       and Staples: turned black into the groin, was told to never have staples again     Oxycodone Headache     Serotonin Unknown     Rebound headaches     Serzone [Nefazodone] Headache     Tolerates trazodone      Tolmetin Other (See Comments)     History of ulcer     Tylenol [Acetaminophen] Headache     Rebound headaches     Sulfacetamide Sodium Rash       Physical Exam:  /60  Pulse 79  Ht 5' 2\" (1.575 m)  Wt 155 lb 6.4 oz (70.5 kg)  SpO2 96%  BMI 28.42 kg/m2  BMI:Body mass index is 28.42 kg/(m^2).   GEN: NAD, obese  Psych: normal mood, normal affect     Labs/Studies:  - We reviewed the results of the overnight PSG as described on the HPI.     Lab Results   Component Value Date    WBC 6.3 01/26/2018    HGB 13.9 01/26/2018    HCT 41.6 01/26/2018    MCV 93 01/26/2018     01/26/2018         Chemistry        Component Value Date/Time     02/26/2018 1521    K 4.1 02/26/2018 1521     02/26/2018 1521    CO2 22 02/26/2018 1521    BUN 12 02/26/2018 1521    CREATININE 0.75 02/26/2018 1521    GLU 90 02/26/2018 1521        Component Value Date/Time    CALCIUM 8.7 02/26/2018 1521    ALKPHOS 63 02/26/2018 1521    AST 25 02/26/2018 1521    ALT 21 02/26/2018 1521    BILITOT 0.9 02/26/2018 1521            No results found for: FERRITIN        Assessment and Plan:  In summary Sandy Spencer is a 77 y.o. year old female here for sleep disturbance.  1.  Snoring  I informed the patient that she does not have obstructive sleep apnea based upon the results of the sleep study and therefore does not qualify for CPAP " therapy as she previously did.  It is possible that the insufficient amount of sleep may have played a role.  She does not qualify for nocturnal oxygen either based upon the results of the study.  I may consider a repeat sleep study in 3 months if she has worsening symptoms.  If we were ever to repeat the sleep study I would strongly recommend that we use a sleep aid for that night of the study.  2.  Periodic limb movement sleep  Most likely secondary to patient's chronic pain issues as well as CRPS.  Will defer to the patient's pain physician as well as neurologist for further evaluation and treatment if clinically indicated.  3.  Other sleep disturbance     Patient verbalized understanding of these issues, agrees with the plan and all questions were answered today. Patient was given an opportuntity to voice any other symptoms or concerns not listed above. Patient did not have any other symptoms or concerns.        Yoel Kyle DO  Board Certified in Internal Medicine and Sleep Medicine  [facility].    We spent a total of 15 minutes of face-to-face encounter and more than 50% of the encounter was used for counseling or coordination of care.    (Note created with Dragon voice recognition and unintended spelling errors and word substitutions may occur)

## 2021-06-16 NOTE — PROGRESS NOTES
ASSESSMENT/PLAN:       1. Right knee pain  -Patient continues to have significant right-sided knee pain and is concerned that there is something more going on than just her arthritis.  She has not had any advanced imaging so we will order an MRI of the need to make sure that there are no other obvious etiologies and we can adjust accordingly from there.  Unfortunately she is not a good surgical candidate for knee replacement but I wonder if she had a meniscal tear something if somebody would be more amenable to doing this.  - MR Knee Without Contrast Right; Future    2. Lumbar radiculopathy  -Patient does have a long-standing history of low back pain that seems to have actually worsened since her spine surgery a year and a half ago.  Updating an MRI to look at the anatomy of this as well.  - MR Lumbar Spine Without Contrast; Future    3. Ingrown toenail without infection  -Patient's toenail appeared to be ingrowing at the distal tip of it and she did cut this down a fair amount today.  She has had this toenail removed several times and is wondering if she can get back into see podiatry for further evaluation and assessment.  - Ambulatory referral to Podiatry    4. Insomnia  -She continues to struggle with insomnia quite a bit.  I am going to have her go up to 2 mg of Lunesta at bedtime and she can try 3 to see if this helps better with her sleeping and she will continue on the nortriptyline per Dr. Lynn.  - eszopiclone (LUNESTA) 2 MG Tab; Take 1-1.5 tablets (2-3 mg total) by mouth at bedtime. Take immediately before bedtime  Dispense: 45 tablet; Refill: 1    5. Essential hypertension  -Blood pressure is doing well at this time.  Continue on current medication, her labs are up-to-date and follow-up in 2-3 months.  - spironolactone (ALDACTONE) 25 MG tablet; Take 1 tablet (25 mg total) by mouth daily.  Dispense: 90 tablet; Refill: 1    6. Hyperlipidemia  -Increasing her atorvastatin back to 80 mg orally daily and  have her follow-up in 1 year.  - atorvastatin (LIPITOR) 80 MG tablet; Take 1 tablet (80 mg total) by mouth at bedtime.  Dispense: 90 tablet; Refill: 3    25 minutes was spent face-to-face with the patient during this encounter and >50% of this was spent on counseling and coordination of care. We discussed in depth the topics listed above.       Caitlyn Rees MD      PROGRESS NOTE   3/26/2018    SUBJECTIVE:  Sandy Spencer is a 77 y.o. female  who presents for follow up.     She has started carbamazepine and she started this and felt drunk after she took it. She took close to a week of this before this. She felt that she was off, drove into the oncoming [place] of traffic withdrawing from the medication. She then asked someone if she had a doll on her head, and was wondering if this was from the medication. This was started for the nerve pain that she has.     Since then she has been started on lunesta and nortriptyline. She is taking the trazodone still at night. Tried two of the Lunesta and still was awake two hours later, slept and then woke up and took the trazodone. She is still sleeping 4-6 hours period. She is getting more and more depressed, every where she goes people know her name.     She doesn't want another drug. She wants a solution. She wants to know where the burning is coming from. The fentanyl patch isn't working well for her pain. She is wondering about the bilateral laminectomy. This was two years ago. She has not had any advanced imaging of her knee or back since the surgery. The pain is increasing in the knee. Described as burning. There all the time. The medicine seems to dull some but is there quite a bit. When she is totally inactive and walks less than 500 steps per day. The burning is much worse with wlaking. Soaking in the tub does help some as does resting. The other day she was just wet and sweaty due to the pain and how severe it was.     Lastly she was picking at her toenail  as it seemed to be ingrowing.  She cut this down quite a bit and has seemed to help some.  She has had this toenail removed in the past more than once and it has grown back.  Chief Complaint   Patient presents with     Follow-up     Patient is here today for a one month follow up in regards to insomnia.     Medication Management     Patient would like to discuss her current medications. Patient needed to stop taking Carbamazepine as it seemed to cause her to feel like she was drunk.      Pain     Patient would like to discuss the pain that is behind the knee of the right leg. She pain is described as burning. It keeps getting worse. To wonders if imaging should be done.          Patient Active Problem List   Diagnosis     Chronic kidney disease (CKD) stage G3a/A1, moderately decreased glomerular filtration rate (GFR) between 45-59 mL/min/1.73 square meter and albuminuria creatinine ratio less than 30 mg/g     Focal glomerular sclerosis     Anxiety and depression     RSD lower limb, bilateral     ADD (attention deficit disorder)     RSD upper limb, right     Other specified hypothyroidism     Anxiety     Osteopenia     Major depression     Hypertension     Insomnia     Mixed hyperlipidemia     Right rotator cuff tear     Cluster headaches     Lumbar radiculopathy     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     Reflex sympathetic dystrophy     Acute encephalopathy     Temporomandibular joint-pain-dysfunction syndrome (TMJ)     Pancreatitis     Localized swelling of lower leg     Medical cannabis use     Acute right-sided low back pain without sciatica     Acute exacerbation of chronic low back pain     Acute pancreatitis     Accelerated hypertension     Acquired hypothyroidism     Chronic pain syndrome     Non morbid obesity due to excess calories     Snoring     Inadequate pain control     Diarrhea     Dehydration     Abdominal pain     Dermatochalasis of left eyelid       Current Outpatient Prescriptions    Medication Sig Dispense Refill     aspirin 81 MG EC tablet Take 81 mg by mouth daily.       atorvastatin (LIPITOR) 80 MG tablet Take 1 tablet (80 mg total) by mouth at bedtime. 90 tablet 3     azelastine (ASTEPRO) 0.15 % (205.5 mcg) Spry nasal spray 1 spray by Left Nare route 2 (two) times a day as needed.       cholecalciferol, vitamin D3, 5,000 unit Tab Take 1 tablet by mouth daily.       diazePAM (VALIUM) 5 MG tablet Take 5 mg by mouth every 6 (six) hours as needed for anxiety (took dose before injection).       diclofenac sodium (VOLTAREN) 1 % Gel Apply twice a day as needed 100 g 2     diphenhydrAMINE (BENADRYL) 25 mg capsule Take 25 mg by mouth every 6 (six) hours as needed.        eszopiclone (LUNESTA) 2 MG Tab Take 1-1.5 tablets (2-3 mg total) by mouth at bedtime. Take immediately before bedtime 45 tablet 1     fentaNYL (DURAGESIC) 50 mcg/hr Place 1 patch on the skin every third day. 10 patch 0     levothyroxine (SYNTHROID, LEVOTHROID) 50 MCG tablet Take 1 tablet (50 mcg total) by mouth daily. 90 tablet 1     losartan (COZAAR) 25 MG tablet Take 1 tablet (25 mg total) by mouth 2 (two) times a day. 180 tablet 1     naloxone (NARCAN) 4 mg/actuation nasal spray 1 spray (4 mg dose) into one nostril for opioid reversal. Call 911. May repeat if no response in 3 minutes. 1 Box 0     nortriptyline (PAMELOR) 10 MG capsule One bedtime for three night, 2 bed for 3 nights, 3 bed for 3 nights then four bedtime 60 capsule 1     OMEGA-3/DHA/EPA/FISH OIL (FISH OIL-OMEGA-3 FATTY ACIDS) 300-1,000 mg capsule Take 1.2 g by mouth 2 (two) times a day.       psyllium (METAMUCIL) 3.4 gram packet Take 1 packet by mouth 2 (two) times a day.  0     spironolactone (ALDACTONE) 25 MG tablet Take 1 tablet (25 mg total) by mouth daily. 90 tablet 1     SUMAtriptan (IMITREX) 50 MG tablet Take 1 tablet (50 mg total) by mouth every 2 (two) hours as needed for migraine. 27 tablet 1     traZODone (DESYREL) 100 MG tablet TAKE 2 TO 4 TABLETS(200   MG) BY MOUTH AT BEDTIME 360 tablet 0     verapamil (CALAN-SR) 240 MG CR tablet Take 1 tablet (240 mg total) by mouth at bedtime. 90 tablet 1     Current Facility-Administered Medications   Medication Dose Route Frequency Provider Last Rate Last Dose     denosumab 60 mg (PROLIA 60 mg/ml)  60 mg Subcutaneous Q6 Months Andrei Olivera MD   60 mg at 02/09/18 1505       History   Smoking Status     Former Smoker     Packs/day: 1.00     Years: 20.00     Quit date: 1/1/2000   Smokeless Tobacco     Never Used           OBJECTIVE:        No results found for this or any previous visit (from the past 240 hour(s)).    Vitals:    03/26/18 1210   BP: 138/68   Patient Site: Left Arm   Patient Position: Sitting   Cuff Size: Adult Regular   Pulse: 74   SpO2: 98%   Weight: 158 lb 1.6 oz (71.7 kg)     Weight: 158 lb 1.6 oz (71.7 kg)        Physical Exam:  GENERAL APPEARANCE: A&A, NAD, well hydrated, well nourished  SKIN:  Normal skin turgor, no lesions/rashes   PSYCH: Affect is stable, well dressed, well groomed. Normal eye contact.   EXTREMITY: no edema,, toenail does not appear infected, it is cut back about [facility] and there is an area of abrasion at the distal tip of the toe  NEURO: no gross deficits

## 2021-06-16 NOTE — PROGRESS NOTES
"Mental Health Visit Note    3/14/2018  Start time: 1200    Stop Time: 1250   Session # 9    Sandy Spencer is a 77 y.o. female is being seen today for    Chief Complaint   Patient presents with     Mental health visit   .     New symptoms or complaints: None    Functional Impairment:   Personal: 4  Family: 2  Work: NA  Social:3    Clinical assessment of mental status: Patient presented alert and oriented x3.  She was well-groomed.  Her attire was appropriate.  Patient was cooperative.  Patient motor actively was normal and eye contact appropriate.  Patients mood was anxious and affect congruent.  Patient s speech and language was normal.  Patient attention and thought process normal.  Concentration was appropriate.  Patient denied delusions or hallucinations. Patient denied suicidal or homicidal ideation.  Patient denied any long or short term memory problems. No evidence of impairment in judgment.  She has insight and fund of knowledge was adequate.        Suicidal/Homicidal Ideation present: None Reported This Session    Patient's impression of their current status: Pt reports anxiety related to family stressors that is impacting her mental and physical health.     Therapist impression of patients current state: Pt reports she has a lot of anxiety related to her sister and upcoming holiday.  Pt asked for assistance on how to speak with sister about her not wanting to go to her house at [facility] due to her sister's behavior during her move.  We discussed ways to be assertive in communication, while practicing self-care.  Pt fears losing full contact with her sister, but realizing that she is not healthy for her.  Pt is reading the book \"Walking on Eggshells\" as it relates to relationship between her and her children & sister.  Pt reports her goal is to have health boundary with her family & not get involved in the \"Drama\" that they create on a regular basis.  Processed therapist upcoming leave and patient agreed to " "[facility]  Pt was given the negative Core belief handout at last session to review, but she did not bring it back today.  She is interested in EMDR and continues to work on resourcing strategies and education on the process.  Pt is anxious to get started.     Type of psychotherapeutic technique provided: Insight oriented, Client centered, Solution-focused, CBT and EMDR    Progress toward short term goals:Progress as expected, self-care, boundaries, communication, EMDR education    Review of long term goals: Date of last review 1/5/2018    Diagnosis:   1. Severe recurrent major depression without psychotic features    2. Generalized anxiety disorder    3. Chronic pain syndrome        Plan and Follow up: Continue to read \"walking on Eggshells\"  Review the handout on Negative Core Beliefs  Follow up with Brinda in 1 weeks  Scheduled appointments with ARY Griffiths  Follow up with all providers as scheduled.       Discharge Criteria/Planning: Patient will continue with follow-up until therapy can be discontinued without return of signs and symptoms.    Ricarda Burns 3/16/2018  "

## 2021-06-16 NOTE — TELEPHONE ENCOUNTER
----- Message from Luz Elena Ybarra MD sent at 3/16/2021  2:48 PM CDT -----  Regarding: RE: Repatha non formulary now change to Praluent??  Please, thanks for help.  Wilbert    ----- Message -----  From: Perla Rodriguez RN  Sent: 3/16/2021   2:32 PM CDT  To: Luz Elena Ybarra MD  Subject: Repatha non formulary now change to Praluent#    Repatha is now non-formulary for [employer]. Do you want me to attempt PA for Repatha or do PA for Praluent?   Message text

## 2021-06-16 NOTE — TELEPHONE ENCOUNTER
Telephone Encounter by Maru Lyon RN at 7/3/2019 10:36 AM     Author: Maru Lyon RN Service: -- Author Type: Registered Nurse    Filed: 7/3/2019 10:42 AM Encounter Date: 7/3/2019 Status: Signed    : Maru Lyon RN (Registered Nurse)       Medication being requested: Fentanyl 50 mcg  Last visit date: 05/31.  Provider: BE  Next visit date: 07/23.  Provider: BE  Expected follow up: 8 weeks  [facility] ([facility] date: N/A  UDT date: 04/19/19  Agreement date: 04/19/19   snipped in:    Pertinent between visit information about requested medication (telephone, mychart, prior authorization, concerns, comments):   Script being sent to provider by nurse- dates and quantity:   Requested Prescriptions     Pending Prescriptions Disp Refills   ? fentaNYL (DURAGESIC) 50 mcg/hr 15 patch 0     Sig: Place 1 patch on the skin every other day.   7/3-8/2  Pharmacy cued: [facility]  Standing orders for withdrawal protocol implemented: N/A

## 2021-06-16 NOTE — TELEPHONE ENCOUNTER
2020 Prior Authorization Request  Who's requesting PA: Pharmacy  Provider: Shane  Pharmacy Name, Location & Phone # : [facility] [place], [place]  Medication Name: fentaNYL (DURAGESIC) 75 mcg/hr  Insurance Plan: [employer]  Insurance Member ID# : [id number]  CoverMyMeds Key: [id number]  Informed patient that prior authorizations can take up to 10 business days for response: YES  Okay to leave a detailed message: YES

## 2021-06-16 NOTE — TELEPHONE ENCOUNTER
Please let the patient know that I confirmed that she should not be walking on a treadmill for the stress test so she can get that ordered.

## 2021-06-16 NOTE — TELEPHONE ENCOUNTER
Telephone Encounter by Nhi Machado at 8/15/2019  9:28 AM     Author: Nhi Machado Service: -- Author Type: --    Filed: 8/15/2019  9:28 AM Encounter Date: 8/14/2019 Status: Signed    : Nhi Machado APPROVED:    Approval start date: 8/14/2019  Approval end date: 12/31/2019    Pharmacy has been notified of approval and will contact patient when medication is ready for pickup.

## 2021-06-16 NOTE — TELEPHONE ENCOUNTER
Telephone Encounter by Eula Lechuga RN at 4/2/2019  8:58 AM     Author: Eula Lechuga RN Service: -- Author Type: Registered Nurse    Filed: 4/2/2019  9:23 AM Encounter Date: 4/1/2019 Status: Signed    : Eula Lechuga RN (Registered Nurse)       Please see documentation surrounding patient needing morphine refilled.  Last refills according to :

## 2021-06-16 NOTE — PROGRESS NOTES
"Assessment/Plan:     Problem List Items Addressed This Visit     RSD lower limb, bilateral    Relevant Medications    fentaNYL (DURAGESIC) 75 mcg/hr (Start on 5/2/2021)    Insomnia    Relevant Medications    traZODone (DESYREL) 100 MG tablet    Lumbar radiculopathy - Primary    Relevant Orders    Pain Management Drug Panel, Urine    Chronic pain syndrome    Relevant Medications    lamoTRIgine (LAMICTAL) 100 MG tablet              No follow-ups on file.    Patient Instructions   PLAN:  Discussed frequency specific microcurrent to help with your concussion, may contact transitions at [phone].    You will provide a urine sample for urine drug test at your clinic appointment on Friday with your primary care provider.    Continue with the fentanyl 75 mcg patch every 48 hours.    Follow-up with Dr. Lynn in 8 weeks        Subjective:       80 y.o. female The patient has been notified of the following:      \"We have found that certain health care needs can be provided without the need for a face to face visit.  This service lets us provide the care you need with a phone conversation.       I will have full access to your [facility] medical record during this entire phone call.   I will be taking notes for your medical record.      Since this is like an office visit, we will bill your insurance company for this service.       There are potential benefits and risks of telephone visits (e.g. limits to patient confidentiality) that differ from in-person visits.?  Confidentiality still applies for telephone services, and nobody will record the visit.  It is important to be in a quiet, private space that is free of distractions (including cell phone or other devices) during the visit.??      If during the course of the call I believe a telephone visit is not appropriate, you will not be charged for this service\"     Consent has been obtained for this service by care team member: Yes    She is followed for lower extremity " chronic regional pain syndrome, lumbar radiculopathy, migraine headaches.    Reviews on 3/4, she fell outside [facility], misjudging occurred.  She broke her hand and wrist, hitting her head and chin suffered a concussion and hurting her knees.    Was seen by neurologist yesterday and will see her primary care provider.  Her neurologist has been doing injections in her shoulder and neck.  She is also occipital blocks.    Since the concussion her blood pressure has been up, she has nausea, she cannot read more than 5 minutes, falls asleep easily.    She wakes every 2 hours.    She has not yet been released to drive.    She did not attribute this to the lisinopril which she had described concerned about a fall at last visit.    She continues on the fentanyl 75 mcg every other day.   is reviewed.  Last urine drug test in February 2020 so was due.      Current Outpatient Medications:      alirocumab (PRALUENT PEN) 75 mg/mL PnIj, Inject 75 mg under the skin every 14 (fourteen) days. (Patient taking differently: Inject 75 mg under the skin every 14 (fourteen) days. ), Disp: 2 mL, Rfl: 11     apixaban ANTICOAGULANT (ELIQUIS) 5 mg Tab tablet, Take 1 tablet (5 mg total) by mouth 2 (two) times a day., Disp: 180 tablet, Rfl: 3     azelastine (ASTEPRO) 205.5 mcg (0.15 %) Spry nasal spray, two times a day. Use in each nostril as directed, Disp: , Rfl:      cholecalciferol, vitamin D3, (VITAMIN D3 ORAL), Take 5,000 Units by mouth daily., Disp: , Rfl:      coenzyme Q10 (CO Q-10) 100 mg capsule, Take 1 capsule (100 mg total) by mouth daily., Disp: 30 capsule, Rfl: 3     lamoTRIgine (LAMICTAL) 100 MG tablet, Take 2 tablets (200 mg total) by mouth 2 (two) times a day., Disp: 120 tablet, Rfl: 5     levothyroxine (SYNTHROID, LEVOTHROID) 100 MCG tablet, Take 0.5 tablets (50 mcg total) by mouth daily., Disp: 45 tablet, Rfl: 3     lisinopriL (PRINIVIL,ZESTRIL) 40 MG tablet, Take 0.5 tablets (20 mg total) by mouth daily., Disp:  "45 tablet, Rfl: 1     ondansetron (ZOFRAN) 4 MG tablet, Take 1 tablet (4 mg total) by mouth every 8 (eight) hours as needed for nausea., Disp: 30 tablet, Rfl: 2     rosuvastatin (CRESTOR) 40 MG tablet, Take 1 tablet (40 mg total) by mouth at bedtime., Disp: 90 tablet, Rfl: 3     sodium phosphates 133 mL (FLEET ENEMA) 19-7 gram/118 mL Enem rectal enema, as needed. , Disp: , Rfl:      SUMAtriptan (IMITREX) 50 MG tablet, Take 1 tablet (50 mg total) by mouth every 2 (two) hours as needed for migraine., Disp: 9 tablet, Rfl: 5     traZODone (DESYREL) 100 MG tablet, Take 4 tablets (400 mg total) by mouth at bedtime. TAKE  4 TABLETS(400 MG) BY MOUTH AT BEDTIME AS NEEDED FOR SLEEP, Disp: 120 tablet, Rfl: 5     valACYclovir (VALTREX) 1000 MG tablet, TAKE ONE TABLET BY MOUTH THREE TIMES A DAY FOR 7 DAYS as needed for outbreaks, Disp: 42 tablet, Rfl: 2     zonisamide (ZONEGRAN) 25 MG capsule, Take 1 capsule (25 mg total) by mouth 3 (three) times a day., Disp: 270 capsule, Rfl: 2     [START ON 5/2/2021] fentaNYL (DURAGESIC) 75 mcg/hr, Place 1 patch on the skin every other day., Disp: 15 patch, Rfl: 0     naloxone (NARCAN) 4 mg/actuation nasal spray, 1 spray (4 mg dose) into one nostril for opioid reversal. Call 911. May repeat if no response in 3 minutes., Disp: 1 Box, Rfl: 0    Current Facility-Administered Medications:      teriparatide injection 20 mcg (FORTEO), 20 mcg, Subcutaneous, DAILY, Caroline Sumner NP    Telephone sounds alert but some cognitive slowing, speech with some latency, thought process linear.    No respiratory distress noted.         Objective:     Vitals:    04/20/21 0842   Height: 5' 3\" (1.6 m)   PainSc:   8   PainLoc: Back         Plan: Reviewed the role of frequency specific microcurrent as a modality that may help with recovering from concussion.    When she sees her primary care provider later this week we will have them obtain a sample for the urine drug test and monitoring.    Total time more than " 18 minutes          This note has been dictated using voice recognition software. Any grammatical or context distortions are unintentional and inherent to the software

## 2021-06-16 NOTE — PATIENT INSTRUCTIONS - HE
Please increase your lisinopril to 20 mg two times daily. If your BP is still over 160 consistently after this change in 10-14 days, please let me know and I'll add in a low dose of a medication called Norvasc.     I placed a referral to the concussion clinic, they should call you to get scheduled in the next several days.     Please add in daily Miralax to help with stooling and once going on a regular basis, you can cut back on the Senna.

## 2021-06-16 NOTE — TELEPHONE ENCOUNTER
Medication being requested: Fentanyl  Last visit date: 2/2/21  Provider: BE  Next visit date: needs appointmenr.  Provider: JUANA  Expected follow up: 8-12 weeks  [facility] visit ([facility]) date: na  UDT date: 2/19/2020  Agreement date: 2/19/2020   (Last fill date; name; strength; provider; MME; quantity):  reviewed, last filled 2/3, # 15, 30 day supply, refill due date 3/5    Pertinent between visit information about requested medication (telephone, mychart, prior authorization, concerns, comments): Continue with the fentanyl 75 mcg every 48 hours.   Pt to ED on 3/12, she fell and hit her head, and fractured right wrist and hand  Script being sent to provider by nurse- dates and quantity:   Requested Prescriptions     Pending Prescriptions Disp Refills     fentaNYL (DURAGESIC) 75 mcg/hr 15 patch 0     Sig: Place 1 patch on the skin every other day. To start this RX on 1/3/21     Pharmacy cued: [facility]  Standing orders for withdrawal protocol implemented: millicent

## 2021-06-16 NOTE — TELEPHONE ENCOUNTER
Central PA team  [phone]  Pool: HE PA MED ([id number])          PA has been initiated.       PA form completed and faxed insurance via Cover My Meds     Key:  [id number]     Medication:  Fentanyl patch 75mcg    Insurance:  [employer]        Response will be received via fax and may take up to 5-10 business days depending on plan

## 2021-06-16 NOTE — PROGRESS NOTES
Assessment & Plan     Memory difficulties  -Given her persistent memory issues since hitting her head with a fall will refer to the concussion clinic for further evaluation. In the meantime, she will continue to limit driving for safety.   - Ambulatory referral to [facility]ussion Clinic    Post concussion syndrome  -per above  - Ambulatory referral to Concussion Clinic    Essential hypertension  -BP elevated today and at home. Will increase her lisinopril to 40 mg daily. If not improving with this I will add in Norvasc as well.   -Unable to exercise due to her chronic pain, RSD, is watching diet as able to limit salt  - Basic Metabolic Panel  - lisinopriL (PRINIVIL,ZESTRIL) 40 MG tablet  Dispense: 90 tablet; Refill: 1    Lumbar radiculopathy  -Due for drug screen through pain clinic updating today. In addition to this we discussed daily Miralax as she is constipated, likely secondary to her chronic opioids.   - Pain Management Drug Panel, Urine           Return in about 4 weeks (around 5/21/2021) for recheck.    Caitlyn Rees MD  [facility]   Sandy Spencer is 80 y.o. and presents today for the following health issues   HPI   She is continuing tohave issues since her concussion. She does have issues with concentration, watching television or reading. She will sit down and fall asleep. The concussion happened when she fell outside of the hospital going in for her mammogram. She tripped on the curb, fell and landed on her chin on 3/12/21. She did have some cuts on her face around the right eye. She did have eight injections in her neck and shoulders five days ago as well at the neurologist. She hasn't driven since this happened as well.     She does want to know what her testing is is related to her kidneys.     She does have issues with her blood pressure. She notes that her BP is high at home and is getting comparable numbers there.     She is constipated all the  time. She is taking SennaS two times dailyand is always constipated.    She does have pain in her ear, touching the left ear on the outside.This happened after covid, it is mostly better now but still uncomfortable at times.  Her equilibrium is off. She doesn't like what is happening with her body.       Review of Systems  Per above      Objective    /72 (Patient Site: Left Arm, Patient Position: Sitting, Cuff Size: Adult Regular)   Pulse 70   Wt 142 lb (64.4 kg)   SpO2 99%   BMI 25.15 kg/m    Body mass index is 25.15 kg/m .  Physical Exam    GENERAL APPEARANCE: A&A, NAD, well hydrated, well nourished  SKIN:  Normal skin turgor, no lesions/rashes   HEENT: moist mucous membranes, no rhinorrhea  NECK: No LAD  CV: RRR, no M/G/R   LUNGS: CTAB  ABDOMEN: S&NT, no masses   EXTREMITY: no edema   NEURO: no gross deficits, normal speech pattern, normal gait   PSYCH: normal affect

## 2021-06-16 NOTE — TELEPHONE ENCOUNTER
Pt updated re: med change and getting med from [facility].  Pt in agreement with plan and had no questions.  Informed pt I would notify Chitra of her wish to proceed.  -lindy

## 2021-06-16 NOTE — TELEPHONE ENCOUNTER
Pt LVM stating she wants to reschedule her nuclear stress test, and she had medication questions.    LVM for pt - to contact scheduling at [phone] if she needs her appt changed, and to call me at [phone] with any medication questions.  -lindy

## 2021-06-16 NOTE — TELEPHONE ENCOUNTER
Telephone Encounter by Eula Lechuga RN at 3/23/2020  3:34 PM     Author: Eula Lechuga RN Service: -- Author Type: Registered Nurse    Filed: 3/23/2020  3:40 PM Encounter Date: 3/23/2020 Status: Signed    : Eula Lechuga RN (Registered Nurse)       Medication being requested: fentanyl 75 mcg patch  Last visit date: 2/19  Provider: BE  Next visit date: 4/21  Provider: BE  Expected follow up: 8 weeks  MTM visit (Pain Center) date: no  UDT/CSA 2/2020   snipped in:    Pertinent between visit information about requested medication (telephone, mychart, prior authorization, concerns, comments): NA  Script being sent to provider by nurse- dates and quantity:   Requested Prescriptions     Pending Prescriptions Disp Refills   ? fentaNYL (DURAGESIC) 75 mcg/hr 15 patch 0     Sig: Place 1 patch on the skin every other day.     Pharmacy cued: [facility]  Standing orders for withdrawal protocol implemented: NA

## 2021-06-16 NOTE — TELEPHONE ENCOUNTER
Telephone Encounter by Eula Lechuga RN at 2/28/2020  9:36 AM     Author: Eula Lechuga RN Service: -- Author Type: Registered Nurse    Filed: 2/28/2020  9:58 AM Encounter Date: 2/28/2020 Status: Signed    : Eula Lechuga RN (Registered Nurse)       Medication being requested: fentanyl 75 mcg patch  Last visit date: 2/19  Provider: BE  Next visit date: 4/19 Provider: BE  Expected follow up: 8 weeks  MTM visit ([facility] Center) date: no  UDT/CSA 2/2020   snipped in:    Pertinent between visit information about requested medication (telephone, mychart, prior authorization, concerns, comments):     Script being sent to provider by nurse- dates and quantity:   Requested Prescriptions     Pending Prescriptions Disp Refills   ? fentaNYL (DURAGESIC) 75 mcg/hr 15 patch 0     Sig: Place 1 patch on the skin every other day.     Pharmacy cued: [facility]  Standing orders for withdrawal protocol implemented: NA

## 2021-06-16 NOTE — PROGRESS NOTES
"Mental Health Visit Note    2/21/2018   Start time: 1300    Stop Time: 1350   Session # 8    Sandy Spencer is a 77 y.o. female is being seen today for    Chief Complaint   Patient presents with     Mental Health Visit   .     New symptoms or complaints: None    Functional Impairment:   Personal: 4  Family: 1  Work: NA  Social:3    Clinical assessment of mental status: Patient presented alert and oriented x3.  She was well-groomed.  Her attire was appropriate.  Patient was cooperative.  Patient motor actively was normal and eye contact appropriate.  Patients mood was anxious and affect congruent.  Patient s speech and language was normal.  Patient attention and thought process normal.  Concentration was appropriate.  Patient denied delusions or hallucinations. Patient denied suicidal or homicidal ideation.  Patient denied any long or short term memory problems. No evidence of impairment in judgment. She has insight and fund of knowledge was adequate.        Suicidal/Homicidal Ideation present: None Reported This Session    Patient's impression of their current status: Pt reports significant psychosocial stressors and feelings of being overwhelmed that impact daily functioning and mood.      Therapist impression of patients current state: Continued with education on EMDR and resourcing strategies. Pt verbalized feeling frustrated that we are not \"getting going with EMDR\" and discussed how the education, resourcing, developing a timeline and understanding for patients past is part of the EMDR process.  We discussed the importance of building the window of tolerance and resourcing is a vital piece of the success of EMDR.  We discussed the next step of developing a timeline and learning about patients attachment hx.      Type of psychotherapeutic technique provided: EMDR    Progress toward short term goals:Progress as expected, Resourcing and education stage of EMDR    Review of long term goals: Date of last review " 1/5/2018    Diagnosis:   1. Severe recurrent major depression without psychotic features    2. Generalized anxiety disorder    3. Chronic pain syndrome        Plan and Follow up: Next session develop trauma timeline & negative core belief  Continue to practice resourcing strategies  Follow up with Brinda in 1 week  Follow up with all providers as scheduled      Discharge Criteria/Planning: Patient will continue with follow-up until therapy can be discontinued without return of signs and symptoms.    Ricarda Burns 2/23/2018

## 2021-06-16 NOTE — TELEPHONE ENCOUNTER
I do recommend [facility], I put a referral in for her. Someone from there will call her to get scheduled.

## 2021-06-16 NOTE — PROGRESS NOTES
----- Message -----  From: Pema Byrne  Sent: 4/6/2021  11:00 AM CDT  To: Rufina Montenegro RN  Subject: NM STRESS TEST                                   Chase Rufina Christianson,     This patient is scheduled for a NM Exercise Stress Test. Patient has two fractured wrists and a concussion from a fall that she took here at the hospital. She wants to get in for this test but is unable to walk on the treadmill due to her fall. Would you be able to put in an order for a Lexiscan? Or please advise. Thank you!    Pema    === NM MPI ordered.   updated.  -lindy

## 2021-06-16 NOTE — PROGRESS NOTES
Sandy reviews with a trial of Remeron, Remeron seem to get of shortness of breath.  Not help her sleep.    She has had a few knee injections.  She noted the did not help.  Recalls the pain behind her knee may have been part of the reason she had the back surgery previously.  That did not respond to the surgery.  She will be seen Dr. Mittal and they are discussing sympathetic blocks versus epidural steroid approaches.    She describes sleep continues to be difficult.  She after [facility] she had a 5 hour nap that up till 2o'clock clock.  Part of the sleep problem she acknowledges relates to stressors and depression.  She is continue to work with [facility].    She reviews challenges with her daughter, granddaughter.  She notes with her history she never learned how to resolve conflict in relationships.  She finds herself very tearful.  She wonders what other medications may also have a role.    Reviews tolerated amitriptyline and nortriptyline at high doses previously.  Did not tolerate Cymbalta and others in part related to sulfa allergies.    She was given a trial of Lunesta to help with sleep.  1 mg was not helpful.  Has used trazodone up to 400 mg at night with limited benefit.    She continues on the fentanyl patch 50 mcg.  Trials of morphine seemed to drop her blood pressure and she does not want renewed.    She sees a neurologist a week this week and review some of her concerns with chronic regional pain syndrome sleep and headaches with them.      Current Outpatient Prescriptions:      aspirin 81 MG EC tablet, Take 81 mg by mouth daily., Disp: , Rfl:      atorvastatin (LIPITOR) 40 MG tablet, Take 1 tablet (40 mg total) by mouth at bedtime., Disp: 90 tablet, Rfl: 3     azelastine (ASTEPRO) 0.15 % (205.5 mcg) Spry nasal spray, 1 spray by Left Nare route 2 (two) times a day as needed., Disp: , Rfl:      cholecalciferol, vitamin D3, 5,000 unit Tab, Take 1 tablet by mouth daily., Disp: , Rfl:      diazePAM (VALIUM) 5 MG  tablet, Take 5 mg by mouth every 6 (six) hours as needed for anxiety (took dose before injection)., Disp: , Rfl:      diclofenac sodium (VOLTAREN) 1 % Gel, Apply twice a day as needed, Disp: 100 g, Rfl: 2     diphenhydrAMINE (BENADRYL) 25 mg capsule, Take 25 mg by mouth every 6 (six) hours as needed. , Disp: , Rfl:      eszopiclone (LUNESTA) 1 MG Tab, Take 0.5-1 tablets (0.5-1 mg total) by mouth at bedtime. Take immediately before bedtime, Disp: 30 tablet, Rfl: 1     fentaNYL (DURAGESIC) 50 mcg/hr, Place 1 patch on the skin every third day., Disp: 10 patch, Rfl: 0     levothyroxine (SYNTHROID, LEVOTHROID) 50 MCG tablet, Take 1 tablet (50 mcg total) by mouth daily., Disp: 90 tablet, Rfl: 1     losartan (COZAAR) 25 MG tablet, Take 1 tablet (25 mg total) by mouth 2 (two) times a day., Disp: 180 tablet, Rfl: 1     naloxone (NARCAN) 4 mg/actuation nasal spray, 1 spray (4 mg dose) into one nostril for opioid reversal. Call 911. May repeat if no response in 3 minutes., Disp: 1 Box, Rfl: 0     OMEGA-3/DHA/EPA/FISH OIL (FISH OIL-OMEGA-3 FATTY ACIDS) 300-1,000 mg capsule, Take 1.2 g by mouth 2 (two) times a day., Disp: , Rfl:      psyllium (METAMUCIL) 3.4 gram packet, Take 1 packet by mouth 2 (two) times a day., Disp: , Rfl: 0     spironolactone (ALDACTONE) 25 MG tablet, Take 1 tablet (25 mg total) by mouth daily. Start 1/2 tab for first week and increase to full tab as tolerated, Disp: 30 tablet, Rfl: 2     SUMAtriptan (IMITREX) 50 MG tablet, Take 1 tablet (50 mg total) by mouth every 2 (two) hours as needed for migraine., Disp: 27 tablet, Rfl: 1     traZODone (DESYREL) 100 MG tablet, TAKE 2 TO 4 TABLETS(200  MG) BY MOUTH AT BEDTIME, Disp: 360 tablet, Rfl: 0     verapamil (CALAN-SR) 240 MG CR tablet, Take 1 tablet (240 mg total) by mouth at bedtime., Disp: 90 tablet, Rfl: 1     nortriptyline (PAMELOR) 10 MG capsule, One bedtime for three night, 2 bed for 3 nights, 3 bed for 3 nights then four bedtime, Disp: 60 capsule,  "Rfl: 1    Current Facility-Administered Medications:      denosumab 60 mg (PROLIA 60 mg/ml), 60 mg, Subcutaneous, Q6 Months, Andrei Olivera MD, 60 mg at 02/09/18 1505  Blood pressure 113/59, pulse 69, resp. rate 16, height 5' 2\" (1.575 m), weight 154 lb (69.9 kg), not currently breastfeeding.    Pain score 7.  Appropriately groomed.  Affect is congruent as she reviews stressors.  No significant pain behavior.    Impression: Chronic pain is occluded right lower extremity chronic regional pain syndrome, migraine headaches, previous history of lumbar laminectomy.    Also GI symptoms which were not the focus today.    Plan given that she has tolerated try cyclic's in the past, and doses above 100 mg with try cyclic's, will resume tricyclic at lower doses.  Caution for anti cholinergic and orthostasis.  Will start with nortriptyline 10 mg increasing every fourth night by 10 mg up to 50 mg if tolerated.  We will taper the trazodone.  May increase Lunesta 2 mg at night over the next week then will decrease.    Discussed some reading material for relationships and she would like some general principles.    Time spent more than 15 minutes face-to-face more than 50% count about above condition and coordination treatment plan  "

## 2021-06-16 NOTE — TELEPHONE ENCOUNTER
Spoke with pt - she would like to use [facility] and declined [facility]. Her annual income is around $19,000.00. She mentioned to me she was sure if she needed an appointment with Dr. Ybarra. She has had COVID twice this year and recently fell and broke her hand and wrist. She mentioned that she has been experiencing pinpricks under her breast bone. I told her I would pass this information onto the team to see if Dr. Ybarra wants to see her.
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
## 2021-06-16 NOTE — TELEPHONE ENCOUNTER
Telephone Encounter by Jossy Rausch at 2/12/2020  8:18 AM     Author: Jossy Rausch Service: -- Author Type: --    Filed: 2/12/2020  8:19 AM Encounter Date: 2/11/2020 Status: Signed    : Jossy Rausch APPROVED:    Approval start date: 02/11/2020  Approval end date:  Until further notice      Pharmacy has been notified of approval and will contact patient when medication is ready for pickup.

## 2021-06-16 NOTE — PROGRESS NOTES
Care guide mailed information for [employer] and [employer] for the patient to review and decide which one she would like to work with.   Patient did receive the [employer] application that the care guide sent but has not filled it out yet. When completed she understands that she needs to send/bring to the clinic for her PCP to complete.     Next outreach due 4/9/18

## 2021-06-16 NOTE — TELEPHONE ENCOUNTER
Telephone Encounter by Maru Lyon RN at 8/26/2019  1:54 PM     Author: Maru Lyon RN Service: -- Author Type: Registered Nurse    Filed: 8/26/2019  1:59 PM Encounter Date: 8/26/2019 Status: Signed    : Maru Lyon RN (Registered Nurse)       Medication being requested: Fentanyl  Last visit date: 07/25 Provider: BE  Next visit date: 09/19  Provider: BE  Expected follow up: 8 wks  MTM visit ([facility]) date: N/A  UDT date: 04/19/19  Agreement date: 04/19/19   snipped in:    Pertinent between visit information about requested medication (telephone, mychart, prior authorization, concerns, comments): N/A  Script being sent to provider by nurse- dates and quantity:   Requested Prescriptions     Pending Prescriptions Disp Refills   ? fentaNYL (DURAGESIC) 50 mcg/hr 15 patch 0     Sig: Place 1 patch on the skin every other day.   8/30-9/29  Pharmacy cued: [facility]  Standing orders for withdrawal protocol implemented: N/A
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
## 2021-06-16 NOTE — TELEPHONE ENCOUNTER
Telephone Encounter by Eula Lechuga RN at 1/28/2020  1:59 PM     Author: Eula Lechuga RN Service: -- Author Type: Registered Nurse    Filed: 1/28/2020  2:12 PM Encounter Date: 1/28/2020 Status: Signed    : Eula Lechuga RN (Registered Nurse)       Medication being requested: fentanyl 75mcg patch and lamotrigine 25 mg  Last visit date:12/27   Provider: BE  Next visit date: 02/19  Provider: BE  Expected follow up:8 weeks  MTM visit (Pain Center) date: no  UDT/CSA-4/19/2019   snipped in:    Pertinent between visit information about requested medication (telephone, mychart, prior authorization, concerns, comments):   1/13-PT apt missed  1/20-PT apt missed  Script being sent to provider by nurse- dates and quantity:   Patient only received a partial fill of #10/15 prescribed on 12/27  Requested Prescriptions     Pending Prescriptions Disp Refills   ? fentaNYL (DURAGESIC) 75 mcg/hr 15 patch 0     Sig: Place 1 patch on the skin every other day.   ? lamoTRIgine (LAMICTAL) 25 MG tablet 90 tablet 2     Sig: Take 50 mg in the AM and 25 mg at HS     Pharmacy cued: [facility]  Standing orders for withdrawal protocol implemented: JUAN

## 2021-06-16 NOTE — TELEPHONE ENCOUNTER
Patient received a 5 day RX by covering provider and will need another refill prior to next office visit.  Per : last filled on 3/17/21, 5 patches for 10 days.    Requested Prescriptions     Pending Prescriptions Disp Refills     fentaNYL (DURAGESIC) 75 mcg/hr 15 patch 0     Sig: Place 1 patch on the skin every other day.     Signed Prescriptions Disp Refills     fentaNYL (DURAGESIC) 75 mcg/hr 5 patch 0     Sig: Place 1 patch on the skin every other day for 10 days.     Authorizing Provider: TRENT WATSON

## 2021-06-16 NOTE — TELEPHONE ENCOUNTER
Scheduling called pt to schedule NMST.  LVM to call [phone] to schedule NMST.  Asked for call back to [phone] to discuss medication.  -lindy

## 2021-06-16 NOTE — PROGRESS NOTES
"The reviews working with Brinda in therapy.  Went to Karen's dinner on Saturday she was \"weeping in public\".  She notes her wedding anniversary is around James and that brought up feelings.  Also describes her family as \"evil\", describes some gatherings where children and grandchildren not come around.  She then later got a call from a substance abuse treatment program asking if she had called.  She believes a family member, particularly her granddaughter who has addiction may have called.  This granddaughter tried to get some of her medications.    She is interested in getting a geniculate block for her knee, though is problems with her insurance.  Continues with the burning pain in her knee.    She reviews her relationship with her sister who had helped her move.  She feels her sister as \"sabotaged\" her in many ways.  Reviews as she has been away for 17 years coming back sees some of the sickness in her family.  Notes her family does not respect some of her boundaries.    She is concerned increased anxiety, BuSpar has not been helping.    Reports using her CPAP device.    She cannot afford agents we discussed such as oxytocin.    Reviews in the past was on amitriptyline she thinks up to 175 mg.  She notes constipation is been a challenge because she only has 9 inches of her large colon left and describes how she manages it.  Has used trazodone up to 400 mg to help sleep.    Current Outpatient Prescriptions:      alendronate (FOSAMAX) 70 MG tablet, Take 1 tablet (70 mg total) by mouth every 7 days., Disp: 12 tablet, Rfl: 3     aspirin 81 MG EC tablet, Take 81 mg by mouth daily., Disp: , Rfl:      atorvastatin (LIPITOR) 40 MG tablet, Take 1 tablet (40 mg total) by mouth at bedtime., Disp: 90 tablet, Rfl: 3     azelastine (ASTEPRO) 0.15 % (205.5 mcg) Spry nasal spray, 1 spray by Left Nare route 2 (two) times a day as needed., Disp: , Rfl:      busPIRone (BUSPAR) 15 MG tablet, Take 1 tablet (15 mg total) by " mouth 2 (two) times a day., Disp: 180 tablet, Rfl: 1     cholecalciferol, vitamin D3, 5,000 unit Tab, Take 1 tablet by mouth daily., Disp: , Rfl:      diazePAM (VALIUM) 5 MG tablet, Take 5 mg by mouth every 6 (six) hours as needed for anxiety (took dose before injection)., Disp: , Rfl:      diclofenac sodium (VOLTAREN) 1 % Gel, Apply twice a day as needed, Disp: 100 g, Rfl: 2     diphenhydrAMINE (BENADRYL) 25 mg capsule, Take 25 mg by mouth every 6 (six) hours as needed. , Disp: , Rfl:      [START ON 2/16/2018] fentaNYL (DURAGESIC) 50 mcg/hr, Place 1 patch on the skin every third day., Disp: 10 patch, Rfl: 0     levothyroxine (SYNTHROID, LEVOTHROID) 50 MCG tablet, Take 1 tablet (50 mcg total) by mouth daily., Disp: 90 tablet, Rfl: 1     losartan (COZAAR) 25 MG tablet, Take 1 tablet (25 mg total) by mouth 2 (two) times a day., Disp: 180 tablet, Rfl: 1     mirtazapine (REMERON) 30 MG tablet, One-half bedtime for four nights, then one bedtime, Disp: 30 tablet, Rfl: 1     morphine (MSIR) 15 MG tablet, Take 1 tablet (15 mg total) by mouth 3 (three) times a day as needed for pain., Disp: 20 tablet, Rfl: 0     naloxone (NARCAN) 4 mg/actuation nasal spray, 1 spray (4 mg dose) into one nostril for opioid reversal. Call 911. May repeat if no response in 3 minutes., Disp: 1 Box, Rfl: 0     OMEGA-3/DHA/EPA/FISH OIL (FISH OIL-OMEGA-3 FATTY ACIDS) 300-1,000 mg capsule, Take 1.2 g by mouth 2 (two) times a day., Disp: , Rfl:      psyllium (METAMUCIL) 3.4 gram packet, Take 1 packet by mouth 2 (two) times a day., Disp: , Rfl: 0     spironolactone (ALDACTONE) 25 MG tablet, Take 1 tablet (25 mg total) by mouth daily. Start 1/2 tab for first week and increase to full tab as tolerated, Disp: 30 tablet, Rfl: 2     SUMAtriptan (IMITREX) 50 MG tablet, Take 1 tablet (50 mg total) by mouth every 2 (two) hours as needed for migraine., Disp: 27 tablet, Rfl: 1     traZODone (DESYREL) 100 MG tablet, Take 2-4 tablets (200-400 mg total) by mouth at  "bedtime., Disp: 360 tablet, Rfl: 1     traZODone (DESYREL) 100 MG tablet, TAKE 2 TO 4 TABLETS(200  MG) BY MOUTH AT BEDTIME, Disp: 360 tablet, Rfl: 0     verapamil (CALAN-SR) 240 MG CR tablet, Take 1 tablet (240 mg total) by mouth at bedtime., Disp: 90 tablet, Rfl: 1    Current Facility-Administered Medications:      denosumab 60 mg (PROLIA 60 mg/ml), 60 mg, Subcutaneous, Q6 Months, Anderi Olivera MD, 60 mg at 02/09/18 1505  Blood pressure 151/71, pulse 68, resp. rate 14, height 5' 2\" (1.575 m), weight 152 lb (68.9 kg), not currently breastfeeding.    Score 7.  She is alert with a clear sensorium thought process tight logical.  Becomes tearful at times, and indicates would like some medications to help with this.    Appropriately groomed.    Assessment: Chronic pain relates to lumbar degenerative changes, right lower extremity chronic regional pain syndrome, migraine headaches, diffuse degenerative changes, GI problems with history of bowel resection.    Significant psychosocial stressors with her family, depression and anxiety.    Plan we discussed have a trial of mirtazapine to help with anxiety and crying.  Would not like to use the amitriptyline she describes above given the anticholinergic side effects.  Will taper the trazodone.  Possible side effects discussed and will gradually increase to 30 mg initially. Will taper and d/c Buspar.    We will review again the geniculate block and issues with insurance.    She continued Brinda Burns for therapy.    Time spent 25 minutes face-to-face more than 50% counseling about above condition and coordination treatment plan  "

## 2021-06-16 NOTE — TELEPHONE ENCOUNTER
----- Message from TIERNEY Mondragon sent at 3/29/2021 11:39 AM CDT -----  Regarding: NICK PATIENT  General phone call:    Caller: PATIENT    Primary cardiologist: NICK     Detailed reason for call: PATIENT STATES SHE MISSED A CALL FROM YOU, SHE WAS UNABLRE TO ADMINISTER HER alirocumab (PRALUENT PEN) 75 mg/mL PnIj, IT WOULDN'T PLUNGE ALL THE WAY, PLEASE CALL AND ADVISE.    Best phone number: [phone]    Best time to contact: ANY    Ok to leave a detailed message? YES    Device? NO    Additional Info:

## 2021-06-16 NOTE — TELEPHONE ENCOUNTER
Telephone Encounter by eLa Naranjo RN at 10/18/2019 11:37 AM     Author: Lea Naranjo RN Service: -- Author Type: Registered Nurse    Filed: 10/18/2019 11:49 AM Encounter Date: 10/18/2019 Status: Signed    : Lea Naranjo RN (Registered Nurse)       Medication being requested: Fentanyl  Last visit date: 9/19  Provider: janina  Next visit date: no f/u.  Provider janina  Expected follow up: 8 weeks  [facility] visit ([facility]) date: no  UDT date: 4/2019  Agreement date: 4/2019   snipped in:    Pertinent between visit information about requested medication (telephone, mychart, prior authorization, concerns, comments): no  Script being sent to provider by nurse- dates and quantity:   Requested Prescriptions     Pending Prescriptions Disp Refills   ? fentaNYL (DURAGESIC) 75 mcg/hr 15 patch 0     Sig: Place 1 patch on the skin every other day.     Pharmacy cued: [facility]  Standing orders for withdrawal protocol implemented: na

## 2021-06-16 NOTE — PROGRESS NOTES
Sandy Spencer is a 80 y.o. female who is being evaluated via a billable telephone visit.      What phone number would you like to be contacted at? [phone]  How would you like to obtain your AVS? Mail a copy   Pain score: 8  Constant or intermittent: constant  What does your pain feel like: burning, sharp, crushing  Does the pain interfere with:   Work: yes  Walking/distance: no  Sleep: yes  Daily activities: no  Relationships/social life: yes  Mood: yes  F= 7    Perla Maya LPN

## 2021-06-16 NOTE — TELEPHONE ENCOUNTER
----- Message from Chitra Lovett sent at 3/18/2021 10:48 AM CDT -----  Need praluent rx to send into [facility]. Thanks!     ==================================  Praluent sent to [facility] per request.  -[facility]

## 2021-06-16 NOTE — TELEPHONE ENCOUNTER
Telephone Encounter by Angie Rice RN at 3/11/2020  3:31 PM     Author: Angie Rice RN Service: -- Author Type: Registered Nurse    Filed: 3/11/2020  4:47 PM Encounter Date: 3/11/2020 Status: Attested    : Angie Rice RN (Registered Nurse) Cosigner: Lashaun Martinez MD at 3/13/2020  5:05 PM    Attestation signed by Lashaun Martinez MD at 3/13/2020  5:05 PM    Agree with above recommendation.                 ANTICOAGULATION  MANAGEMENT    Assessment     Today's INR result of 3.4 is Supratherapeutic (goal INR of 2.0-3.0)        Warfarin taken as previously instructed    No new diet changes affecting INR    No interaction expected between Repatha and warfarin - to start tonight     Potential interaction between lactulose and warfarin which may affect subsequent INRs - per micromedex may have an affect     Continues to tolerate warfarin with no reported s/s of bleeding or thromboembolism     Previous INR was Supratherapeutic    Plan:     Spoke with Sandy regarding INR result and instructed:     Warfarin Dosing Instructions:  Take 5 mg,  then continue current warfarin dose 7.5 mg daily on Sundays and Thursdays; and 10 mg daily rest of week  (0 % change)    Instructed patient to follow up no later than: 2 weeks     Education provided: importance of therapeutic range, importance of following up for INR monitoring at instructed interval and importance of taking warfarin as instructed    Sandy verbalizes understanding and agrees to warfarin dosing plan.    Instructed to call the [facility] for any changes, questions or concerns. (#[phone])   ?   Angie Rice RN    Subjective/Objective:      Sandy Spencer, a 79 y.o. female is on warfarin.     Sandy reports:     Home warfarin dose: verbally confirmed home dose with Sandy  and updated on anticoagulation calendar     Missed doses: No     Medication changes:  Yes, lactulose     S/S of bleeding or thromboembolism:  No     New Injury or illness:   No     Changes in diet or alcohol consumption:  No     Upcoming surgery, procedure or cardioversion:  No    Anticoagulation Episode Summary     Current INR goal:   2.0-3.0   TTR:   35.6 % (11.4 mo)   Next INR check:   3/25/2020   INR from last check:   3.40! (3/11/2020)   Weekly max warfarin dose:      Target end date:      INR check location:      Preferred lab:      Send INR reminders to:   [facility]    Indications    Other chronic pulmonary embolism without acute cor pulmonale (H) [I27.82]           Comments:            Anticoagulation Care Providers     Provider Role Specialty Phone number    Caitlyn Rees MD Referring Family Medicine [phone]

## 2021-06-17 NOTE — TELEPHONE ENCOUNTER
Telephone Encounter by Eula Lechuga RN at 5/28/2020  2:43 PM     Author: Eula Lechuga RN Service: -- Author Type: Registered Nurse    Filed: 5/28/2020  2:51 PM Encounter Date: 5/28/2020 Status: Signed    : Eula Lechuga RN (Registered Nurse)       Medication being requested: fentanyl 75 mcg patch  Last visit date: 4/21 Provider: BE  Next visit date: 6/15 Provider: BE  Expected follow up: 8 weeks  [facility]M visit ([facility]) date: no  UDT/CSA 2/2020   snipped in:    Pertinent between visit information about requested medication (telephone, mychart, prior authorization, concerns, comments):   PA approval-movantik  5/13-admission to general surgery  Script being sent to provider by nurse- dates and quantity:   Requested Prescriptions     Pending Prescriptions Disp Refills   ? fentaNYL (DURAGESIC) 75 mcg/hr 15 patch 0     Sig: Place 1 patch on the skin every other day.     Pharmacy cued: [facility]  Standing orders for withdrawal protocol implemented: JUAN

## 2021-06-17 NOTE — TELEPHONE ENCOUNTER
Telephone Encounter by Evon Michael RN at 12/18/2020 12:14 PM     Author: Evon Michael RN Service: -- Author Type: Registered Nurse    Filed: 12/18/2020  1:27 PM Encounter Date: 12/18/2020 Status: Signed    : Evon Michael RN (Registered Nurse)       ANTICOAGULATION  MANAGEMENT    Assessment     Today's INR result of 3.40 is Supratherapeutic (goal INR of 2.0-3.0)        Warfarin taken as previously instructed    Nausea may be affecting diet and INR    Potential interaction between Prednisone  and warfarin which may affect subsequent INRs    - Ondansetron 4mg q8hrs PRN for nausea,   - tapering Prednisone 40mg x5 days, 30mg x5 days, 20mg x5 days, 10mg for x5 days, then stop.   - Levothyroxine 50mcg decreased to 1/2 tablet daily, just temporarily.    Continues to tolerate warfarin with no reported s/s of bleeding or thromboembolism     Previous INR was Subtherapeutic at 1.70 on 12/11/20.    F/u today with Dr. Rees - post covid symptoms, increased headaches, nausea, shingles again.    Plan:     Spoke on phone with Sandy regarding INR result and instructed:     Warfarin Dosing Instructions:  (mornings. 5mg tabs)   - today, advised one time lower dose with 2.5mg warfarin,   - then continue current warfarin dose 5 mg daily on Wednesdays; and 7.5 mg daily rest of week.    Instructed patient to follow up no later than:  One wk.   - INR scheduled on 12/24/20 prior to PT @ [facility]    Education provided: importance of consistent vitamin K intake, target INR goal and significance of current INR result, potential interaction between warfarin and Prednisone, importance of notifying clinic for changes in medications and monitoring for bleeding signs and symptoms    Sandy verbalizes understanding and agrees to warfarin dosing plan.    Instructed to call the [facility] for any changes, questions or concerns. (#[phone])   ?   Evon Michael RN    Subjective/Objective:      jacoby Hartman  y.o. female is on warfarin.     Sandy reports:     Home warfarin dose: as updated on anticoagulation calendar per template     Missed doses: No     Medication changes:  Yes:  As mentioned above.     S/S of bleeding or thromboembolism:  No     New Injury or illness:  Yes: complains of post covid symptoms.     Changes in diet or alcohol consumption:  No     Upcoming surgery, procedure or cardioversion:  No    Anticoagulation Episode Summary     Current INR goal:  2.0-3.0   TTR:  16.6 % (1 y)   Next INR check:  12/24/2020   INR from last check:  3.40 (12/18/2020)   Weekly max warfarin dose:     Target end date:     INR check location:     Preferred lab:     Send INR reminders to:  [facility]    Indications    Other chronic pulmonary embolism without acute cor pulmonale (H) [I27.82]           Comments:           Anticoagulation Care Providers     Provider Role Specialty Phone number    Caitlyn Rees MD Referring Family Medicine [phone]

## 2021-06-17 NOTE — TELEPHONE ENCOUNTER
Telephone Encounter by Aye Rice RN at 10/5/2020  1:53 PM     Author: Aye Rice RN Service: -- Author Type: Registered Nurse    Filed: 10/5/2020  1:56 PM Encounter Date: 10/2/2020 Status: Signed    : Aye Rice RN (Registered Nurse)       ANTICOAGULATION  MANAGEMENT    Assessment     10/2/2020's INR result of 1.5 is Subtherapeutic (goal INR of 2.0-3.0)        Warfarin taken as previously instructed - did not get the message about a booster dose for 10/2    No new diet changes affecting INR    No new medication/supplements affecting INR    Continues to tolerate warfarin with no reported s/s of bleeding or thromboembolism     Previous INR was Subtherapeutic    Plan:     Spoke on phone with Sandy regarding INR result and instructed:     Warfarin Dosing Instructions:  Take one time booster dose warfarin 7.5 mg tonight  then continue current warfarin dose 7.5 mg daily on Tuesdays, Thursdays and Saturdays; and 5 mg daily rest of week  (0 % change)    Booster dose instructed today, since confirmed patient did not receive voicemail to take on 10/2, this INR recheck was to ensure patient is trending towards her goal range since she has been out of range for sometime, next INR recheck to determine if dose change is necessary.     Instructed patient to follow up no later than: 10/7    Education provided: importance of therapeutic range, target INR goal and significance of current INR result, importance of following up for INR monitoring at instructed interval and importance of taking warfarin as instructed    Sandy verbalizes understanding and agrees to warfarin dosing plan.    Instructed to call the [facility] for any changes, questions or concerns. (#[phone])   ?   Aye Rice RN    Subjective/Objective:      Sandy Stoutton, a 80 y.o. female is on warfarin.     Sandy reports:     Home warfarin dose: verbally confirmed home dose with Sandy and updated on anticoagulation  calendar     Missed doses: No     Medication changes:  No     S/S of bleeding or thromboembolism:  No     New Injury or illness:  No     Changes in diet or alcohol consumption:  No     Upcoming surgery, procedure or cardioversion:  No    Anticoagulation Episode Summary     Current INR goal:  2.0-3.0   TTR:  20.7 % (1 y)   Next INR check:  10/7/2020   INR from last check:  1.50 (10/2/2020)   Weekly max warfarin dose:     Target end date:     INR check location:     Preferred lab:     Send INR reminders to:  [facility]    Indications    Other chronic pulmonary embolism without acute cor pulmonale (H) [I27.82]           Comments:           Anticoagulation Care Providers     Provider Role Specialty Phone number    Caitlyn Rees MD Referring Family Medicine [phone]

## 2021-06-17 NOTE — TELEPHONE ENCOUNTER
Telephone Encounter by Marisol Currie RN at 1/12/2021  1:02 PM     Author: Marisol Currie RN Service: -- Author Type: Registered Nurse    Filed: 1/12/2021  1:15 PM Encounter Date: 1/12/2021 Status: Signed    : Marisol Currie RN (Registered Nurse)       ANTICOAGULATION  MANAGEMENT    Assessment     Today's INR result of 1.0 is Subtherapeutic (goal INR of 2.0-3.0)        Warfarin taken as previously instructed    No new diet changes affecting INR    No new medication/supplements affecting INR    Continues to tolerate warfarin with no reported s/s of bleeding or thromboembolism     Previous INR was Subtherapeutic    Plan:     Spoke on phone with Sandy regarding INR result and instructed:     Warfarin Dosing Instructions:  Take 12.5 mg today then change warfarin dose to 5 mg daily on Mondays; and 7.5 mg daily rest of week  (11.1 % change) She is working on trying to get generic Eliquis from [facility]. Will update pharmacist. Discussed dosing with [facility] pharmacist.     Instructed patient to follow up no later than: 5-7 days    Education provided: importance of therapeutic range, target INR goal and significance of current INR result, importance of following up for INR monitoring at instructed interval and importance of taking warfarin as instructed    Sandy verbalizes understanding and agrees to warfarin dosing plan.    Instructed to call the [facility] for any changes, questions or concerns. (#[phone])   ?   Marisol Currie RN    Subjective/Objective:      Sandy ELSIE Spencer, a 80 y.o. female is on warfarin.     Sandy reports:     Home warfarin dose: verbally confirmed home dose with Sandy and updated on anticoagulation calendar     Missed doses: No     Medication changes:  No     S/S of bleeding or thromboembolism:  No     New Injury or illness:  No     Changes in diet or alcohol consumption:  No     Upcoming surgery, procedure or cardioversion:  No    Anticoagulation Episode Summary      Current INR goal:  2.0-3.0   TTR:  15.6 % (1 y)   Next INR check:  1/19/2021   INR from last check:  1.00 (1/12/2021)   Weekly max warfarin dose:     Target end date:     INR check location:     Preferred lab:     Send INR reminders to:  [facility]    Indications    Other chronic pulmonary embolism without acute cor pulmonale (H) [I27.82]           Comments:           Anticoagulation Care Providers     Provider Role Specialty Phone number    Caitlyn Rees MD Referring Family Medicine [phone]

## 2021-06-17 NOTE — PROGRESS NOTES
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 increase your activities gradually.   6. During recovery, it is normal to feel frustrated and sad when you do not feel right and you can t be as active as usual.  7. Repeated evaluation of your symptoms is recommended to help guide recovery. Please follow up as recommended by your doctor to ensure a safe and healthy recovery.    Watch for and go to the Emergency Department if you have any of the following symptoms:  Headaches that significantly worsen  Looks very drowsy or can t be awakened  Can t recognize people or places  Worsening neck pain  Seizures  Repeated vomiting  Increasing confusion or irritability  Unusual behavioral change  Slurred speech  Weakness or numbness in arms/legs  Change in state of consciousness    For more information, please visit on the Internet:  http://www.cdc.gov/concussion/get_help.html   http://www.cdc.gov/concussion/pdf/Facts_about_Concussion_TBI-a.pdf    General Information:  Today you had your appointment with Faith Gonzalez CNP     If lab work was done today as part of your evaluation you will generally be contacted via My Chart, mail, or phone with the results within 1-5 days. If there is an alarming result we will contact you by phone. Lab results come " back at varying times, I generally wait until all labs are resulted before making comments on results. Please note labs are automatically released to My Chart once available.     If you need refills please contact your pharmacist. They will send a refill request to me to review. Please allow 3 business days for us to process all refill requests.     Please call or send a medical message through My Chart, with any questions or concerns    If you need any paperwork completed please fax forms to [phone]. Please state if you would like a copy of the completed paperwork, mailed or faxed back to the patient and a fax number to fax the paperwork to. Please allow up to 10 days for paperwork to be completed.    Faith Gonzalez, CNP

## 2021-06-17 NOTE — PROGRESS NOTES
Patient scheduled for a 1000 psychotherapy follow-up appointment on 4/11/18 with this provider. She called and cancelled the appointment on 4/10/18 at 1344 which is within the 24-hour window and therefore a late cancel. Requested  send her the late cancel/no show policy letter.

## 2021-06-17 NOTE — TELEPHONE ENCOUNTER
"Please call patient [phone] (home)        Patient reporting ongoing elevated blood pressures since clinic visit.   Stating most readings remain in 180's/80-95. Ongoing headaches in the morning. Stating she is getting scared with ongoing elevated readings.  Patient has scheduled with concussion clinic for the \"middle of next month.\"     Current reading this morning 158/84 pulse 76.  Stating she had 1 reading 130's/80 since clinic visit.   166/82 last evening.     Patient has increased Lisinopril. Stating for the past 3 days she has taken 3 tablets (60 mg) instead of 40 mg as prescribed.    See discharge instructions below from 4/23/21 clinic visit.     Please increase your lisinopril to 20 mg two times daily. If your BP is still over 160 consistently after this change in 10-14 days, please let me know and I'll add in a low dose of a medication called Norvasc.       Magali Mayorga RN  [facility]    COVID 19 Nurse Triage Plan/Patient Instructions    Please be aware that novel coronavirus (COVID-19) may be circulating in the community. If you develop symptoms such as fever, cough, or SOB or if you have concerns about the presence of another infection including coronavirus (COVID-19), please contact your health care provider or visit  [email or web address].    Disposition/Instructions    Virtual Visit with provider recommended. Reference Visit Selection Guide.    Thank you for taking steps to prevent the spread of this virus.  o Limit your contact with others.  o Wear a simple mask to cover your cough.  o Wash your hands well and often.    Resources    [facility]: About COVID-19: [email or web address]    CDC: What to Do If You're Sick: www.cdc.gov/coronavirus/2019-ncov/about/steps-when-sick.html    CDC: Ending Home Isolation: www.cdc.gov/coronavirus/2019-ncov/hcp/disposition-in-home-patients.html     CDC: Caring for Someone: " www.cdc.gov/coronavirus/2019-ncov/if-you-are-sick/care-for-someone.html     [facility]: Interim Guidance for Hospital Discharge to Home: www.health.[facility].[place].us/diseases/coronavirus/hcp/hospdischarge.pdf    [facility] clinical trials (COVID-19 research studies): clinicalaffairs.[facility]/umn-clinical-trials     Below are the COVID-19 hotlines at the [place] Department of Health ([facility]). Interpreters are available.   o For health questions: Call 707-101-8376 or 1-669.589.8764 (7 a.m. to 7 p.m.)  o For questions about schools and childcare: Call 463-078-9467 or 1-645.286.3403 (7 a.m. to 7 p.m.)         Reason for Disposition    Patient wants to be seen    Additional Information    Negative: Sounds like a life-threatening emergency to the triager    Negative: Pregnant > 20 weeks or postpartum (< 6 weeks after delivery) and new hand or face swelling    Negative: Pregnant > 20 weeks and BP > 140/90    Negative: Systolic BP >= 160 OR Diastolic >= 100, and any cardiac or neurologic symptoms (e.g., chest pain, difficulty breathing, unsteady gait, blurred vision)    Negative: Patient sounds very sick or weak to the triager    Negative: BP Systolic BP >= 140 OR Diastolic >= 90 and postpartum (from 0 to 6 weeks after delivery)    Negative: Systolic BP >= 180 OR Diastolic >= 110, and missed most recent dose of blood pressure medication    Negative: Systolic BP >= 180 OR Diastolic >= 110    Protocols used: HIGH BLOOD PRESSURE-A-OH

## 2021-06-17 NOTE — TELEPHONE ENCOUNTER
Telephone Encounter by Luis Felipe Thomas at 6/17/2020  4:02 PM     Author: Luis Felipe Thomas Service: -- Author Type: --    Filed: 6/17/2020  4:04 PM Encounter Date: 6/16/2020 Status: Signed    : Luis Felipe Thomas APPROVED:    Approval start date: 06/096/2020  Approval end date:  UNTIL FURTHER NOTICE    Pharmacy has been notified of approval and will contact patient when medication is ready for pickup.

## 2021-06-17 NOTE — PROGRESS NOTES
Attempt 1: Care Guide called patient.  If this patient is returning my call, please transfer to Areli at ext [phone].    Next Outreach: 05/10/2018

## 2021-06-17 NOTE — TELEPHONE ENCOUNTER
Telephone Encounter by Steph Vera RN at 5/1/2020  2:35 PM     Author: Steph Vera RN Service: -- Author Type: Registered Nurse    Filed: 5/1/2020  2:36 PM Encounter Date: 5/1/2020 Status: Signed    : Steph Vera RN (Registered Nurse)       Patient left a message stating she would need a refill of Fentanyl patches as she is out.  After review, an order was sent in at last appointment and has not yet been picked up.

## 2021-06-17 NOTE — PROGRESS NOTES
Occupational Therapy Daily Progress     Patient Name: Sandy Spencer  Date: 5/13/2021  Visit #: 2  Referral Diagnosis: closed fracture of right hand, (fracture of of distal end of right radius as well as right 5th metacarpal)  Referring provider: Caitlyn Rees*  Visit Diagnosis:     ICD-10-CM    1. Pain in finger of right hand  M79.644    2. Right hand weakness  R29.898    3. Decreased activities of daily living (ADL)  Z78.9        Assessment:     Patient is appropriate to continue with skilled occupational therapy intervention, as indicated by initial plan of care.    Goal Status:  Patient Will Demonstrate / Verbalize independence in self-management of condition in: 2 weeks  Patient will be independent with home exercise program in: 2 weeks  Patient will be able to: lift;carry;reach;for grocery shopping;with less pain;in 12 weeks  Patient will perform: housework;with less pain;in 12 weeks  Patient will be able to  & pinch: for meal prep;with less pain;in 12 weeks  Patient will improve hand/finger coordination for: writing;typing;with less pain;in 12 weeks      Plan / Patient Education:     Plan for next visit:  paraffin to right hand, gentle STM, kinesiotape ulnar side of hand     Subjective:     Pain rating at rest: 4  Pain rating with activity: 8      Objective:      Treatment Today: Paraffin to right hand today for 15 minutes. Patient reports increased right shoulder pain since starting hand exercise. Instructed patient to decrease the frequency of the stretch to the right fingers and focus on the AROM. Instructed on brachial plexus nerve glides to right UE.    Last Visit: Patient instructed on gentle stretch to right fingers in claw position today. Practiced technique to get proper form and avoid substitutions as well as emphasized that it's a gentle and prolonged stretch and not an aggressive stretch.   TREATMENT MINUTES COMMENTS   Evaluation     Self-care/ Home management     Manual therapy      Neuromuscular Re-education 7    Therapeutic Exercises 10    Paraffin 15    Orthotic Fitting     Total 32    Blank areas are intentional and mean the treatment did not include these items.       Amarilys Jolly  5/13/2021  1:07 PM

## 2021-06-17 NOTE — TELEPHONE ENCOUNTER
Telephone Encounter by Aye Rice RN at 10/27/2020  3:32 PM     Author: Aye Rice RN Service: -- Author Type: Registered Nurse    Filed: 10/27/2020  3:49 PM Encounter Date: 10/27/2020 Status: Signed    : Aye Rice RN (Registered Nurse)       ANTICOAGULATION  MANAGEMENT    Assessment     Today's INR result of 1.5 is Subtherapeutic (goal INR of 2.0-3.0)        Warfarin taken differently than instructed, but no impact to total weekly dose    No new diet changes affecting INR    No new medication/supplements affecting INR    Continues to tolerate warfarin with no reported s/s of bleeding or thromboembolism     Previous INR was Subtherapeutic    Plan:     Spoke on phone with Sandy regarding INR result and instructed:     Warfarin Dosing Instructions:  Take warfarin 10 mg today only  then change warfarin dose to 5 mg daily on Wednesdays; and 7.5 mg daily rest of week  (11.1 % change)    Instructed patient to follow up no later than: 11/13 during office visit with Dr. Rees     Education provided: importance of taking warfarin as instructed, Strategies to improve compliance (pillbox, alarm, calendar, etc), importance of notifying clinic for changes in medications and importance of notifying clinic for diarrhea, nausea/vomiting, reduced intake and/or illness    Sandy verbalizes understanding and agrees to warfarin dosing plan.    Instructed to call the [facility] for any changes, questions or concerns. (#[phone])   ?   Aye Rice RN    Subjective/Objective:      Sandy ZIMMERMAN Tj, a 80 y.o. female is on warfarin.     Sandy reports:     Home warfarin dose: verbally confirmed home dose with Sandy and updated on anticoagulation calendar     Missed doses: No     Medication changes:  No     S/S of bleeding or thromboembolism:  No     New Injury or illness:  No     Changes in diet or alcohol consumption:  No     Upcoming surgery, procedure or cardioversion:  No    Anticoagulation  Episode Summary     Current INR goal:  2.0-3.0   TTR:  19.7 % (1 y)   Next INR check:  11/13/2020   INR from last check:  1.50 (10/27/2020)   Weekly max warfarin dose:     Target end date:     INR check location:     Preferred lab:     Send INR reminders to:  [facility]    Indications    Other chronic pulmonary embolism without acute cor pulmonale (H) [I27.82]           Comments:           Anticoagulation Care Providers     Provider Role Specialty Phone number    Caitlyn Rees MD Referring Family Medicine [phone]

## 2021-06-17 NOTE — TELEPHONE ENCOUNTER
Telephone Encounter by Nhi Machado at 4/24/2020  1:52 PM     Author: Nhi Machado Service: -- Author Type: --    Filed: 4/24/2020  1:52 PM Encounter Date: 4/24/2020 Status: Signed    : Nhi Machado APPROVED:    Approval start date: 4/24/2020  Approval end date:  No exp date    Pharmacy has been notified of approval and will contact patient when medication is ready for pickup.

## 2021-06-17 NOTE — TELEPHONE ENCOUNTER
Telephone Encounter by Rufina Montenegro RN at 5/12/2021 12:49 PM     Author: Rufina Montenegro RN Service: -- Author Type: Registered Nurse    Filed: 5/12/2021  1:03 PM Encounter Date: 5/12/2021 Status: Signed    : Rufina Montenegro RN (Registered Nurse)       Received fax from [facility] requesting 90 day supply of Praluent.    Rx had previously been submitted to pharm for 90 day supply.  Pharm was notified.  Perhaps this is a joe qty limit?  -[facility]

## 2021-06-17 NOTE — TELEPHONE ENCOUNTER
Telephone Encounter by Chitra Owen at 3/16/2021  2:38 PM     Author: Chitra Owen Service: -- Author Type: Financial Resource Guide    Filed: 3/16/2021  2:42 PM Encounter Date: 3/16/2021 Status: Signed    : Chitra Owen (Financial Resource Guide)       PA Initiation  Medication: Praluent 75mg/ml  QTY: 2 pens for 28 days  Insurance Company: [employer] Part D  Pharmacy Filing Rx: pending  Filling Pharmacy Phone: na  Filling Pharmacy Fax: na  Start Date: 3/16/21  Additional Information: switching from repatha to praluent due to plan's formulary

## 2021-06-17 NOTE — PROGRESS NOTES
ASSESSMENT/PLAN:       1. Insomnia  -The patient continues to struggle with sleeping quite a bit, she has been on Seroquel in the past.  Given her appearance last week I am going to have her try a low-dose of Seroquel to see if this helps alleviate some of her symptoms.  She will follow-up here in the next few weeks for recheck.  - QUEtiapine (SEROQUEL) 25 MG tablet; Take 1 tablet (25 mg total) by mouth at bedtime.  Dispense: 30 tablet; Refill: 1    2. Confusion  -Given her history of migraines, her loss of time last week I am going to order an MRI of her head for further evaluation.  I suspect most of her symptoms last week related to anxiety however.  - MR Brain Without Contrast; Future    3. Chronic pain  -Patient and I spent some time discussing her pain.  At this point I had like to see if we can determine whether confusion and the memory loss came from last week, we will discuss her pain management at a future visit.    25 minutes was spent face-to-face with the patient during this encounter and >50% of this was spent on counseling and coordination of care. We discussed in depth the topics listed above.       Caitlyn Rees MD      PROGRESS NOTE   5/7/2018    SUBJECTIVE:  Sandy Spencer is a 77 y.o. female  who presents for follow up.     The patient was seen last week on May for hearing clinic after an ER visit.  The patient states that she fell asleep on May 1 in the evening as normal and did not wake up until May 4 around  1:00 in the morning.  She does not recall any of the time in between.  She called 911 and presented to the ER and described a headache.  She received some Imitrex, had some basic lab work and was sent home without any head imaging.  Patient then presented here with significant anxiety related to her symptoms and her loss of time.  The patient saw the nurse practitioner here.  She was quite concerned as in the past she had some pills removed from her intestines after they were  not being digested related to prior intestinal surgery.  She is worried that this is maybe what happened again.  Additionally she has stopped taking the nortriptyline several days prior to this.  She is taking Ambien for sleep.    The day prior to her symptoms she found out that her son had quit his job and moved out of state and she was having difficulty getting hold of him.    Thankfully she is feeling a bit better now.  She denies an inability to stop talking which is what was happening last week.  She continues to recall more more of the time that she feels like she had forgotten.  Interestingly the patient has had a history of possible josé miguel in the past.    She does wonder if she needs a different doctor to listen to her pain level and help her get back to a functioning normal person in life so she doesn't have to carry the pain with her knee, chronic pain and RSD. She does note that taping her leg does help and her swelling is down some as well.       Chief Complaint   Patient presents with     Follow-up     Patient is here today to follow up from her recent hospital visit and clinic visit.      Medication Management     Patient would like to discuss her current medications. She wonders if the Nortriptyline causes the metal taste in her mouth.          Patient Active Problem List   Diagnosis     Chronic kidney disease (CKD) stage G3a/A1, moderately decreased glomerular filtration rate (GFR) between 45-59 mL/min/1.73 square meter and albuminuria creatinine ratio less than 30 mg/g     Focal glomerular sclerosis     Anxiety and depression     RSD lower limb, bilateral     ADD (attention deficit disorder)     RSD upper limb, right     Other specified hypothyroidism     Anxiety     Osteopenia     Major depression     Hypertension     Insomnia     Mixed hyperlipidemia     Right rotator cuff tear     Cluster headaches     Lumbar radiculopathy     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     Reflex sympathetic  dystrophy     Acute encephalopathy     Temporomandibular joint-pain-dysfunction syndrome (TMJ)     Pancreatitis     Localized swelling of lower leg     Medical cannabis use     Acute right-sided low back pain without sciatica     Acute exacerbation of chronic low back pain     Acute pancreatitis     Accelerated hypertension     Acquired hypothyroidism     Chronic pain syndrome     Non morbid obesity due to excess calories     Snoring     Inadequate pain control     Diarrhea     Dehydration     Abdominal pain     Dermatochalasis of left eyelid       Current Outpatient Prescriptions   Medication Sig Dispense Refill     aspirin 81 MG EC tablet Take 81 mg by mouth daily.       atorvastatin (LIPITOR) 80 MG tablet Take 1 tablet (80 mg total) by mouth at bedtime. 90 tablet 3     azelastine (ASTEPRO) 0.15 % (205.5 mcg) Spry nasal spray 1 spray by Left Nare route 2 (two) times a day as needed.       cholecalciferol, vitamin D3, 5,000 unit Tab Take 1 tablet by mouth daily.       diclofenac sodium (VOLTAREN) 1 % Gel Apply twice a day as needed 100 g 2     diphenhydrAMINE (BENADRYL) 25 mg capsule Take 25 mg by mouth every 6 (six) hours as needed.        fentaNYL (DURAGESIC) 50 mcg/hr Place 1 patch on the skin every third day. 10 patch 0     levothyroxine (SYNTHROID, LEVOTHROID) 50 MCG tablet Take 1 tablet (50 mcg total) by mouth daily. 90 tablet 1     losartan (COZAAR) 25 MG tablet Take 1 tablet (25 mg total) by mouth 2 (two) times a day. 180 tablet 1     naloxone (NARCAN) 4 mg/actuation nasal spray 1 spray (4 mg dose) into one nostril for opioid reversal. Call 911. May repeat if no response in 3 minutes. 1 Box 0     OMEGA-3/DHA/EPA/FISH OIL (FISH OIL-OMEGA-3 FATTY ACIDS) 300-1,000 mg capsule Take 1.2 g by mouth 2 (two) times a day.       psyllium (METAMUCIL) 3.4 gram packet Take 1 packet by mouth 2 (two) times a day.  0     spironolactone (ALDACTONE) 25 MG tablet Take 1 tablet (25 mg total) by mouth daily. 90 tablet 1      SUMAtriptan (IMITREX) 50 MG tablet Take 1 tablet (50 mg total) by mouth every 2 (two) hours as needed for migraine. 27 tablet 1     traZODone (DESYREL) 100 MG tablet TAKE 2 TO 4 TABLETS(200  MG) BY MOUTH AT BEDTIME 360 tablet 0     verapamil (CALAN-SR) 240 MG CR tablet Take 1 tablet (240 mg total) by mouth at bedtime. 90 tablet 1     zolpidem (AMBIEN) 10 mg tablet Take 1 tablet (10 mg total) by mouth at bedtime as needed for sleep. 30 tablet 3     nortriptyline (PAMELOR) 50 MG capsule Take 1 capsule (50 mg total) by mouth at bedtime. 30 capsule 2     QUEtiapine (SEROQUEL) 25 MG tablet Take 1 tablet (25 mg total) by mouth at bedtime. 30 tablet 1     Current Facility-Administered Medications   Medication Dose Route Frequency Provider Last Rate Last Dose     denosumab 60 mg (PROLIA 60 mg/ml)  60 mg Subcutaneous Q6 Months Andrei Olivera MD   60 mg at 02/09/18 1505       History   Smoking Status     Former Smoker     Packs/day: 1.00     Years: 20.00     Quit date: 1/1/2000   Smokeless Tobacco     Never Used           OBJECTIVE:        Recent Results (from the past 240 hour(s))   HM2(CBC w/o Differential)   Result Value Ref Range    WBC 5.6 4.0 - 11.0 thou/uL    RBC 4.05 3.80 - 5.40 mill/uL    Hemoglobin 12.7 12.0 - 16.0 g/dL    Hematocrit 37.0 35.0 - 47.0 %    MCV 91 80 - 100 fL    MCH 31.4 27.0 - 34.0 pg    MCHC 34.3 32.0 - 36.0 g/dL    RDW 12.7 11.0 - 14.5 %    Platelets 169 140 - 440 thou/uL    MPV 7.3 7.0 - 10.0 fL   Thyroid Stimulating Hormone (TSH)   Result Value Ref Range    TSH 1.08 0.30 - 5.00 uIU/mL   Follicle Stimulating Hormone (FSH)   Result Value Ref Range    FSH 51.7 mIU/mL   Lipase   Result Value Ref Range    Lipase 26 0 - 52 U/L       Vitals:    05/07/18 0912   BP: 110/60   Patient Site: Left Arm   Patient Position: Sitting   Cuff Size: Adult Regular   Pulse: 94   SpO2: 97%   Weight: 159 lb 1.6 oz (72.2 kg)     Weight: 159 lb 1.6 oz (72.2 kg)        Physical Exam:  GENERAL APPEARANCE: A&A,  NAD, well hydrated, well nourished  SKIN:  Normal skin turgor, no lesions/rashes   Psych: Her affect is anxious, she is casually dressed and groomed, her thought process and speech pattern are normal, her eye contact is normal  EXTREMITY: no edema   NEURO: no gross deficits, good recall at this time, neurologically she appears intact at this time

## 2021-06-17 NOTE — TELEPHONE ENCOUNTER
Telephone Encounter by Angie Rice RN at 4/6/2020  4:03 PM     Author: Angie Rice RN Service: -- Author Type: Registered Nurse    Filed: 4/6/2020  4:14 PM Encounter Date: 4/6/2020 Status: Attested    : Angie Rice RN (Registered Nurse) Cosigner: Caitlyn Rees MD at 4/6/2020  4:16 PM    Attestation signed by Caitlyn Rees MD at 4/6/2020  4:16 PM    I have reviewed the notes, assessments, and/or procedures performed by anticoagulation nursing staff, I concur with her/his documentation of Sandy Spencer.                  ANTICOAGULATION  MANAGEMENT    Assessment     Today's INR result of 3.0 is Therapeutic (goal INR of 2.0-3.0)        Warfarin taken as previously instructed    No new diet changes affecting INR    No new medication/supplements affecting INR    Continues to tolerate warfarin with no reported s/s of bleeding or thromboembolism     Previous INR was Subtherapeutic    Plan:     Spoke with Sandy regarding INR result and instructed:     Warfarin Dosing Instructions:  Continue current warfarin dose 12.5 mg daily on Wednesdays; and 10 mg daily rest of week  (0 % change)    Instructed patient to follow up no later than: 2 weeks     Education provided: importance of consistent vitamin K intake, impact of vitamin K foods on INR, importance of therapeutic range, importance of following up for INR monitoring at instructed interval and importance of taking warfarin as instructed    Sandy verbalizes understanding and agrees to warfarin dosing plan.    Instructed to call the [facility] for any changes, questions or concerns. (#[phone])   ?   Angie Rice RN    Subjective/Objective:      Sandy Spencer, a 79 y.o. female is on warfarin.     Sandy reports:     Home warfarin dose: verbally confirmed home dose with Sandy  and updated on anticoagulation calendar     Missed doses: No     Medication changes:  No     S/S of bleeding or thromboembolism:  No     New Injury or  illness:  No     Changes in diet or alcohol consumption:  No     Upcoming surgery, procedure or cardioversion:  No    Anticoagulation Episode Summary     Current INR goal:   2.0-3.0   TTR:   37.3 % (1 y)   Next INR check:   4/20/2020   INR from last check:   3.00 (4/6/2020)   Weekly max warfarin dose:      Target end date:      INR check location:      Preferred lab:      Send INR reminders to:   [facility]    Indications    Other chronic pulmonary embolism without acute cor pulmonale (H) [I27.82]           Comments:            Anticoagulation Care Providers     Provider Role Specialty Phone number    Caitlyn Rees MD Referring Family Medicine [phone]

## 2021-06-17 NOTE — TELEPHONE ENCOUNTER
Telephone Encounter by Eula Lechuga RN at 7/29/2020  9:57 AM     Author: Eula Lechuga RN Service: -- Author Type: Registered Nurse    Filed: 7/29/2020 10:03 AM Encounter Date: 7/29/2020 Status: Signed    : Eula Lechuga RN (Registered Nurse)       Medication being requested: fentanyl 75 mcg  Last visit date: 6/15  Provider: [facility]  Next visit date: 8/10  Provider: BE  Expected follow up: 8 weeks  MTM visit ([facility]) date: no  UDT/CSA 2/2020   (Last fill date; name; strength; provider; MME; quantity):    Pertinent between visit information about requested medication (telephone, mychart, prior authorization, concerns, comments):   PA approval for methocarbamol on 6/16  Script being sent to provider by nurse- dates and quantity:   Requested Prescriptions     Pending Prescriptions Disp Refills   ? fentaNYL (DURAGESIC) 75 mcg/hr [Pharmacy Med Name: FENTANYL 75MCG/HR PT72] 15 patch 0     Sig: Place 1 patch on the skin every other day.     Pharmacy cued: [facility]  Standing orders for withdrawal protocol implemented: NA

## 2021-06-17 NOTE — TELEPHONE ENCOUNTER
Telephone Encounter by Shey Tilley RN at 12/11/2020  2:27 PM     Author: Shey Tilley RN Service: -- Author Type: Registered Nurse    Filed: 12/11/2020  2:55 PM Encounter Date: 12/11/2020 Status: Signed    : Shey Tilley RN (Registered Nurse)       ANTICOAGULATION  MANAGEMENT    Assessment     Today's INR result of 1.7 is Subtherapeutic (goal INR of 2.0-3.0)        Warfarin taken differently than instructed, but no impact to total weekly dose    No new diet changes affecting INR    No new medication/supplements affecting INR    Continues to tolerate warfarin with no reported s/s of bleeding or thromboembolism     Previous INR was Subtherapeutic    Plan:     Spoke on phone with Sandy regarding INR result and instructed:     Warfarin Dosing Instructions:  Change warfarin dose to 5 mg daily on Wed; and 7.5 mg daily rest of week  (5.5 % change)    Instructed patient to follow up no later than: 1 week at     Education provided: target INR goal and significance of current INR result    Sandy verbalizes understanding and agrees to warfarin dosing plan.    Instructed to call the [facility] for any changes, questions or concerns. (#[phone])   ?   Shey Tilley RN    Subjective/Objective:      Sandy Spencer, a 80 y.o. female is on warfarin.     Sandy reports:     Home warfarin dose: verbally confirmed home dose with Sandy and updated on anticoagulation calendar     Missed doses: No     Medication changes:  No     S/S of bleeding or thromboembolism:  No     New Injury or illness:  No     Changes in diet or alcohol consumption:  No     Upcoming surgery, procedure or cardioversion:  No    Anticoagulation Episode Summary     Current INR goal:  2.0-3.0   TTR:  16.0 % (1 y)   Next INR check:  12/18/2020   INR from last check:  1.70 (12/11/2020)   Weekly max warfarin dose:     Target end date:     INR check location:     Preferred lab:     Send INR reminders to:  [facility]     Indications    Other chronic pulmonary embolism without acute cor pulmonale (H) [I27.82]           Comments:           Anticoagulation Care Providers     Provider Role Specialty Phone number    Caitlyn Rees MD Referring [facility] Medicine [phone]

## 2021-06-17 NOTE — TELEPHONE ENCOUNTER
Telephone Encounter by Litzy Gore RN at 2/25/2021  3:34 PM     Author: Litzy Gore RN Service: -- Author Type: Registered Nurse    Filed: 2/25/2021  4:53 PM Encounter Date: 2/25/2021 Status: Addendum    : Litzy Gore RN (Registered Nurse)    Related Notes: Original Note by Litzy Gore RN (Registered Nurse) filed at 2/25/2021  4:50 PM       ANTICOAGULATION  MANAGEMENT    Assessment     Today's INR result of 1.4 is Subtherapeutic (goal INR of 2.0-3.0)        Warfarin recently held as instructed which may be affecting INR    No new diet changes affecting INR    No new medication/supplements affecting INR    Continues to tolerate warfarin with no reported s/s of bleeding or thromboembolism     Previous INR was Therapeutic     PLAN is for patient to  transition from warfarin to Eliquis when INR is below 2 ( anticoagulation encounter dated 2/23 )    Plan:     Spoke on phone with Sandy regarding INR result and instructed:      Warfarin Dosing Instructions:  stop warfarin     Start Eliquis 5 mg dose tonight  1. Take Eliquis 2 times a day ~ 12 hours apart  2. May take with or without food  3. Make sure not to miss any dose/doses due to medication is short acting,  4. Continue to monitor signs and symptoms of bleeding         Instructed patient to follow up no later than: not needed - scheduled INR cancelled.    Education provided: importance of taking warfarin as instructed and monitoring for bleeding signs and symptoms     Made aware that [facility] will call and follow up with her in 2 weeks then she will be discharged from [facility] if doing well with Eliquis.    Sandy verbalizes understanding and agrees to warfarin dosing plan.    Instructed to call the [facility] for any changes, questions or concerns. (#[phone])   ?   Litzy Gore RN    Subjective/Objective:      Sandy ELSIE Spencer, a 80 y.o. female is on warfarin. Sandy Delgado reports:     Home warfarin dose: verbally confirmed  home dose with Sandy and updated on anticoagulation calendar     Missed doses: Yes: as instructed     Medication changes:  No     S/S of bleeding or thromboembolism:  No     New Injury or illness:  No     Changes in diet or alcohol consumption:  No     Upcoming surgery, procedure or cardioversion:  No    Anticoagulation Episode Summary     Current INR goal:  2.0-3.0   TTR:  21.2 % (1 y)   Next INR check:  3/11/2021   INR from last check:  1.40 (2/25/2021)   Weekly max warfarin dose:     Target end date:     INR check location:     Preferred lab:     Send INR reminders to:  [facility]    Indications    Other chronic pulmonary embolism without acute cor pulmonale (H) [I27.82]           Comments:           Anticoagulation Care Providers     Provider Role Specialty Phone number    Caitlyn Rees MD Referring Family Medicine [phone]

## 2021-06-17 NOTE — TELEPHONE ENCOUNTER
Telephone Encounter by Aye Rice RN at 12/4/2020  6:03 PM     Author: Aye Rice RN Service: -- Author Type: Registered Nurse    Filed: 12/4/2020  6:07 PM Encounter Date: 12/4/2020 Status: Signed    : Aye Rice RN (Registered Nurse)       ANTICOAGULATION  MANAGEMENT    Assessment     Today's INR result of 1.9 is Subtherapeutic (goal INR of 2.0-3.0)        Warfarin taken as previously instructed    No new diet changes affecting INR    No new medication/supplements affecting INR    Continues to tolerate warfarin with no reported s/s of bleeding or thromboembolism     Previous INR was Supratherapeutic    Plan:     Spoke on phone with Sandy regarding INR result and instructed:     Warfarin Dosing Instructions:  Continue current warfarin dose 5 mg daily on Mondays and Fridays; and 7.5 mg daily rest of week  (0 % change)    No boost considered, previous INR elevated 2 days ago     Instructed patient to follow up no later than: 12/11    Education provided: importance of taking warfarin as instructed    Sandy verbalizes understanding and agrees to warfarin dosing plan.    Instructed to call the [facility] for any changes, questions or concerns. (#[phone])   ?   Aye Rice RN    Subjective/Objective:      Sandy ZIMMERMAN Tj, a 80 y.o. female is on warfarin.     Sandy reports:     Home warfarin dose: verbally confirmed home dose with Sandy  and updated on anticoagulation calendar     Missed doses: No     Medication changes:  No     S/S of bleeding or thromboembolism:  No     New Injury or illness:  No     Changes in diet or alcohol consumption:  No     Upcoming surgery, procedure or cardioversion:  No    Anticoagulation Episode Summary     Current INR goal:  2.0-3.0   TTR:  18.0 % (1 y)   Next INR check:  12/11/2020   INR from last check:  1.90 (12/4/2020)   Weekly max warfarin dose:     Target end date:     INR check location:     Preferred lab:     Send INR reminders to:   [facility]    Indications    Other chronic pulmonary embolism without acute cor pulmonale (H) [I27.82]           Comments:           Anticoagulation Care Providers     Provider Role Specialty Phone number    Caitlyn Rees MD [facility] Medicine [phone]

## 2021-06-17 NOTE — TELEPHONE ENCOUNTER
I sent in an additional medication, the amlodipine/norvasc that we talked about to start for now. This should help get things down. All BP medications do take some time to work though unfortunately.

## 2021-06-17 NOTE — TELEPHONE ENCOUNTER
Telephone Encounter by Chitra Owen at 3/18/2021 10:41 AM     Author: Chitra Owen Service: -- Author Type: Financial Resource Guide    Filed: 3/18/2021 10:48 AM Encounter Date: 3/16/2021 Status: Signed    : Chitra Owen (Financial Resource Guide)       Patient is already enrolled in the [employer] and only has a $208.99 balance and it is for Rep[employer] - that won't even cover the the first copay of $433 so I went ahead and enrolled her in the [employer] joe

## 2021-06-17 NOTE — PROGRESS NOTES
Chief Complaint   Patient presents with     Follow-up     ED follow up 5/4 Migraine. Pt has been feeling disoriented and confused. Neighbor found her at 1AM this morning on the floor, pt was listess with no muscle control.        HPI: Patient is a 77-year-old medically complicated female who presents today with her neighbor following an emergency department visit at [facility] earlier this morning.  The neighbor reports that she received a call from the patient to come over and help and when she went over the patient was on the floor, disoriented and confused.  She tried to pick her up but her body was quite weak.  She called 911 and EMS brought her to [facility].  At that time she was slightly hypertensive and was complaining primarily of a headache which was diagnosed as most likely a migraine.  She was given injectable Imitrex and her symptoms improved so she was discharged home with recommendations to follow up with primary care. Of note, none of the weakness/difficulty to arouse was mentioned in the ED nursing or provider notes mentioning instead that the patient awoke with a migraine with associated metallic taste in her mouth and watery eyes.    Since discharge from the hospital, the patient's headache has still remained improved however she is concerned as to what caused her symptoms earlier this morning.  She thinks that she still remembers the events of and how they transpired, but is not 100% sure.  She does mention that she cannot recall whether or not she is taking her medications at all in the last week.  The symptoms have never happened before.  She denies loss of bowel or bladder during that episode.  She denies a history of memory problems.    The patient has multiple specialists including the pain clinic and neurology.  She was recently started on Ambien to replace the Lunesta given concerns about interactions with her fentanyl patches and the Lunesta.  Since then she does not think that she has been  "sleeping as well and shows me information obtained from her fit bit which she says shows fragmented sleep.  She also complains that she is having issues with feeling warm and sweating and wonders about having her \"hormones checked\".    Kerry complains of right leg pain and swelling which has remained unchanged since her meniscal tear.  She says that she called her orthopedist office and left her contact information however they have not reached out back to her.  She is still able to walk on the leg with some discomfort and does not require the use of a walker or cane.     ROS:Review of Systems - History obtained from the patient  General ROS: positive for  - hot flashes, night sweats and sleep disturbance  Psychological ROS: positive for - concentration difficulties and mood swings  Endocrine ROS: positive for - hot flashes  Respiratory ROS: negative  Cardiovascular ROS: negative  Gastrointestinal ROS: positive for - abdominal pain and change in bowel habits  Genito-Urinary ROS: negative  Musculoskeletal ROS: positive for - joint pain, joint stiffness and joint swelling  Neurological ROS: positive for - gait disturbance, headaches, impaired coordination/balance and memory loss    SH: The Patient's  reports that she quit smoking about 18 years ago. She has a 20.00 pack-year smoking history. She has never used smokeless tobacco. She reports that she does not drink alcohol or use illicit drugs.      FH: The Patient's family history includes Aortic aneurysm in her mother; Heart disease in her father and mother; Kidney disease in her father and mother; Stroke in her father.     Meds:    Current Outpatient Prescriptions on File Prior to Visit   Medication Sig Dispense Refill     aspirin 81 MG EC tablet Take 81 mg by mouth daily.       atorvastatin (LIPITOR) 80 MG tablet Take 1 tablet (80 mg total) by mouth at bedtime. 90 tablet 3     azelastine (ASTEPRO) 0.15 % (205.5 mcg) Spry nasal spray 1 spray by Left Nare route 2 " (two) times a day as needed.       cholecalciferol, vitamin D3, 5,000 unit Tab Take 1 tablet by mouth daily.       fentaNYL (DURAGESIC) 50 mcg/hr Place 1 patch on the skin every third day. 10 patch 0     levothyroxine (SYNTHROID, LEVOTHROID) 50 MCG tablet Take 1 tablet (50 mcg total) by mouth daily. 90 tablet 1     losartan (COZAAR) 25 MG tablet Take 1 tablet (25 mg total) by mouth 2 (two) times a day. 180 tablet 1     naloxone (NARCAN) 4 mg/actuation nasal spray 1 spray (4 mg dose) into one nostril for opioid reversal. Call 911. May repeat if no response in 3 minutes. 1 Box 0     nortriptyline (PAMELOR) 50 MG capsule Take 1 capsule (50 mg total) by mouth at bedtime. 30 capsule 2     SUMAtriptan (IMITREX) 50 MG tablet Take 1 tablet (50 mg total) by mouth every 2 (two) hours as needed for migraine. 27 tablet 1     traZODone (DESYREL) 100 MG tablet TAKE 2 TO 4 TABLETS(200  MG) BY MOUTH AT BEDTIME 360 tablet 0     diclofenac sodium (VOLTAREN) 1 % Gel Apply twice a day as needed 100 g 2     diphenhydrAMINE (BENADRYL) 25 mg capsule Take 25 mg by mouth every 6 (six) hours as needed.        OMEGA-3/DHA/EPA/FISH OIL (FISH OIL-OMEGA-3 FATTY ACIDS) 300-1,000 mg capsule Take 1.2 g by mouth 2 (two) times a day.       psyllium (METAMUCIL) 3.4 gram packet Take 1 packet by mouth 2 (two) times a day.  0     spironolactone (ALDACTONE) 25 MG tablet Take 1 tablet (25 mg total) by mouth daily. 90 tablet 1     verapamil (CALAN-SR) 240 MG CR tablet Take 1 tablet (240 mg total) by mouth at bedtime. 90 tablet 1     zolpidem (AMBIEN) 10 mg tablet Take 1 tablet (10 mg total) by mouth at bedtime as needed for sleep. 30 tablet 3     Current Facility-Administered Medications on File Prior to Visit   Medication Dose Route Frequency Provider Last Rate Last Dose     denosumab 60 mg (PROLIA 60 mg/ml)  60 mg Subcutaneous Q6 Months Andrei Olivera MD   60 mg at 02/09/18 1505     [DISCONTINUED] SUMAtriptan injection 6 mg (IMITREX)  6 mg  Subcutaneous Q2H PRN Vanessa Orosco MD   6 mg at 05/04/18 0250       O:  /88 (Patient Site: Right Arm)  Pulse 84  Temp 98.9  F (37.2  C)  Wt 159 lb 11.2 oz (72.4 kg)  BMI 29.21 kg/m2  Physical Examination: General appearance - alert, well appearing, and in no distress and anxious  Mental status - alert, oriented to person, place, and time.  Speech was at times tangential and not always linear.   Lymphatics - no palpable lymphadenopathy  Chest - clear to auscultation, no wheezes, rales or rhonchi, symmetric air entry  Heart - normal rate and regular rhythm, S1 and S2 normal, no murmurs noted, normal bilateral carotid upstroke without bruits.   Abdomen - soft, nontender, nondistended, no masses or organomegaly  Back exam - pain with motion noted during exam  Neurological - neck supple without rigidity, cranial nerves II through XII intact, motor and sensory grossly normal bilaterally, normal muscle tone, no tremors, strength 5/5  Musculoskeletal -there is pain with palpation and movement of the right knee along with associated swelling reflective of the known meniscus tear in that knee.  The patient is wearing Kinesio tape which she says provides relief.  Extremities - intact peripheral pulses. Mild swelling across the right knee with some extending into the upper half of the calf.    A/P:   I informed Kerry that at this time I cannot say with 100% confidence exactly what happened to her last night given the complexity of her medication regimen, her past medical history, and a questionable reliability with her history that she provided me.  My initial concern is a potential interaction with her changing to Ambien from Lunesta interacting with her fentanyl patches, but a review of her psychiatrist notes indicates that this is a safer option than the Lunesta and the patient says that she actually has not been taking her medications because she has no memory of it.  She remained hypertensive in her  visit today, but that could also be related to the fact that she has not been taking her antihypertensives too.  The patient did have a brief conversation with her primary and the plan is for her to make sure that someone is with her through the weekend to make sure she remains safe and to follow-up with her on Monday.    1. Migraine     2. Acquired hypothyroidism  Thyroid Stimulating Hormone (TSH)   3. Essential hypertension     4. Weakness  HM2(CBC w/o Differential)   5. Excessive sweating  Follicle Stimulating Hormone (FSH)   6. Abdominal pain  Lipase   7. History of pancreatitis  Lipase   8. Reflex sympathetic dystrophy     9. Chronic pain syndrome             Sloan Hussein, CNP
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
## 2021-06-17 NOTE — TELEPHONE ENCOUNTER
Telephone Encounter by Aye Rice RN at 12/2/2020  1:16 PM     Author: Aye Rice RN Service: -- Author Type: Registered Nurse    Filed: 12/2/2020  4:59 PM Encounter Date: 12/2/2020 Status: Signed    : Aye Rice RN (Registered Nurse)       ANTICOAGULATION  MANAGEMENT    Assessment     Today's INR result of 4.5 is Supratherapeutic (goal INR of 2.0-3.0)        Warfarin taken as previously instructed    No new diet changes affecting INR    No new medication/supplements affecting INR    Continues to tolerate warfarin with no reported s/s of bleeding or thromboembolism     Previous INR was Subtherapeutic     Spoke with patient early about dizziness and lightheaded, she denied any shortness of breath, vision changes or chest pains.    Plan:     Spoke on phone with Sandy regarding INR result and instructed:     Warfarin Dosing Instructions:  Hold warfarin tonight,  then change warfarin dose to 5 mg daily on Mondays and Fridays; and 7.5 mg daily rest of week  (13.6 % change)    Discontinue Lovenox     Instructed patient to follow up no later than: 12/4    Education provided: importance of taking warfarin as instructed    Sandy verbalizes understanding and agrees to warfarin dosing plan.    Instructed to call the [facility] for any changes, questions or concerns. (#[phone])   ?   Aye Rice RN    Subjective/Objective:      Sandy ELSIE Tj, a 80 y.o. female is on warfarin.     Sandy reports:     Home warfarin dose: verbally confirmed home dose with Sandy  and updated on anticoagulation calendar     Missed doses: No     Medication changes:  No     S/S of bleeding or thromboembolism:  No     New Injury or illness:  No     Changes in diet or alcohol consumption:  No     Upcoming surgery, procedure or cardioversion:  No    Anticoagulation Episode Summary     Current INR goal:  2.0-3.0   TTR:  18.0 % (1 y)   Next INR check:  12/4/2020   INR from last check:  4.50 (12/2/2020)   Weekly  max warfarin dose:     Target end date:     INR check location:     Preferred lab:     Send INR reminders to:  [facility]    Indications    Other chronic pulmonary embolism without acute cor pulmonale (H) [I27.82]           Comments:           Anticoagulation Care Providers     Provider Role Specialty Phone number    Caitlyn Rees MD Referring Family Medicine [phone]

## 2021-06-17 NOTE — PROGRESS NOTES
ASSESSMENT/PLAN:       1. Mixed hyperlipidemia  -Increase cholesterol medication in February, updating lab work today.  She is tolerating this without difficulty.  Continue on current medications and follow-up in 3 months.  - Comprehensive Metabolic Panel  - Lipid Cascade    2. Essential hypertension  -She is doing well on the new medications, updating prescription today for 90 day supply.  Updating lab work today as well, follow-up in 3-6 months.  - spironolactone (ALDACTONE) 25 MG tablet; Take 1 tablet (25 mg total) by mouth daily.  Dispense: 90 tablet; Refill: 1    3. Right knee meniscal tear  -The patient continues to have burning her right knee pain.  We discussed that this certainly could be secondary to her meniscal tears.  I recommended she go back to orthopedics to consult with them to discuss if they think she would be a good candidate for an arthroscopic surgery rather than the knee replacement.  She will consider this.    4. Chronic pain syndrome  -The patient continues to feel frustrated with her pain management.  Discussed that she should talk with pain clinic about the possibility of increasing the fentanyl patch up to 75 mcg as she is unable to afford the medical cannabis.      30 minutes was spent face-to-face with the patient during this encounter and >50% of this was spent on counseling and coordination of care. We discussed in depth the topics listed above.       Caitlyn Rees MD      PROGRESS NOTE   4/24/2018    SUBJECTIVE:  Sandy Spencer is a 77 y.o. female  who presents for follow up.     She does feel like things are going generally ok. She does not feel that the fentanyl patches are helping. She remains interested in medical cannabis but it is too expensive.  She has tried many homeopathic things to help with her pain as well but a lot of these are also too expensive for her.    Her main complaint is the burning pain that she has in the back of her right knee.  She did have an  MRI done of her knee as well as her low back.  The right knee showed bilateral meniscal tears and her back MRI showed bulging disks and foraminal stenosis.  She did have an EMG done of her right lower extremity without any obvious nerve issues.  If she walks more than 200-500 steps per day she gets severe pain in the knee and she is unable to move for the next few days.  She has had 11 injections in that knee but does think that the cortisone shot did help some.  The hyaluronic acid ones did not.  She has been evaluated for knee replacement by 2 different surgeons both of whom said she is not a good candidate.  This is prior to knowing that she had a torn meniscus.    She had her cholesterol medication increased back in February and is due to have this rechecked again.  She is tolerating the medication without difficulty.  Chief Complaint   Patient presents with     Follow-up     Patient is here today for her one month follow up.          Patient Active Problem List   Diagnosis     Chronic kidney disease (CKD) stage G3a/A1, moderately decreased glomerular filtration rate (GFR) between 45-59 mL/min/1.73 square meter and albuminuria creatinine ratio less than 30 mg/g     Focal glomerular sclerosis     Anxiety and depression     RSD lower limb, bilateral     ADD (attention deficit disorder)     RSD upper limb, right     Other specified hypothyroidism     Anxiety     Osteopenia     Major depression     Hypertension     Insomnia     Mixed hyperlipidemia     Right rotator cuff tear     Cluster headaches     Lumbar radiculopathy     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     Reflex sympathetic dystrophy     Acute encephalopathy     Temporomandibular joint-pain-dysfunction syndrome (TMJ)     Pancreatitis     Localized swelling of lower leg     Medical cannabis use     Acute right-sided low back pain without sciatica     Acute exacerbation of chronic low back pain     Acute pancreatitis     Accelerated hypertension      Acquired hypothyroidism     Chronic pain syndrome     Non morbid obesity due to excess calories     Snoring     Inadequate pain control     Diarrhea     Dehydration     Abdominal pain     Dermatochalasis of left eyelid       Current Outpatient Prescriptions   Medication Sig Dispense Refill     aspirin 81 MG EC tablet Take 81 mg by mouth daily.       atorvastatin (LIPITOR) 80 MG tablet Take 1 tablet (80 mg total) by mouth at bedtime. 90 tablet 3     azelastine (ASTEPRO) 0.15 % (205.5 mcg) Spry nasal spray 1 spray by Left Nare route 2 (two) times a day as needed.       cholecalciferol, vitamin D3, 5,000 unit Tab Take 1 tablet by mouth daily.       diazePAM (VALIUM) 5 MG tablet Take 1 tablet (5 mg total) by mouth every 8 (eight) hours as needed for anxiety (took dose before injection). 15 tablet 0     diclofenac sodium (VOLTAREN) 1 % Gel Apply twice a day as needed 100 g 2     diphenhydrAMINE (BENADRYL) 25 mg capsule Take 25 mg by mouth every 6 (six) hours as needed.        fentaNYL (DURAGESIC) 50 mcg/hr Place 1 patch on the skin every third day. 10 patch 0     levothyroxine (SYNTHROID, LEVOTHROID) 50 MCG tablet Take 1 tablet (50 mcg total) by mouth daily. 90 tablet 1     losartan (COZAAR) 25 MG tablet Take 1 tablet (25 mg total) by mouth 2 (two) times a day. 180 tablet 1     naloxone (NARCAN) 4 mg/actuation nasal spray 1 spray (4 mg dose) into one nostril for opioid reversal. Call 911. May repeat if no response in 3 minutes. 1 Box 0     nortriptyline (PAMELOR) 50 MG capsule Take 1 capsule (50 mg total) by mouth at bedtime. 30 capsule 2     OMEGA-3/DHA/EPA/FISH OIL (FISH OIL-OMEGA-3 FATTY ACIDS) 300-1,000 mg capsule Take 1.2 g by mouth 2 (two) times a day.       psyllium (METAMUCIL) 3.4 gram packet Take 1 packet by mouth 2 (two) times a day.  0     spironolactone (ALDACTONE) 25 MG tablet Take 1 tablet (25 mg total) by mouth daily. 90 tablet 1     SUMAtriptan (IMITREX) 50 MG tablet Take 1 tablet (50 mg total) by  mouth every 2 (two) hours as needed for migraine. 27 tablet 1     traZODone (DESYREL) 100 MG tablet TAKE 2 TO 4 TABLETS(200  MG) BY MOUTH AT BEDTIME 360 tablet 0     verapamil (CALAN-SR) 240 MG CR tablet Take 1 tablet (240 mg total) by mouth at bedtime. 90 tablet 1     zolpidem (AMBIEN) 10 mg tablet Take 1 tablet (10 mg total) by mouth at bedtime as needed for sleep. 30 tablet 3     Current Facility-Administered Medications   Medication Dose Route Frequency Provider Last Rate Last Dose     denosumab 60 mg (PROLIA 60 mg/ml)  60 mg Subcutaneous Q6 Months Andrei Olivera MD   60 mg at 02/09/18 1505       History   Smoking Status     Former Smoker     Packs/day: 1.00     Years: 20.00     Quit date: 1/1/2000   Smokeless Tobacco     Never Used           OBJECTIVE:        No results found for this or any previous visit (from the past 240 hour(s)).    Vitals:    04/24/18 1408   BP: 110/60   Patient Site: Left Arm   Pulse: 92   SpO2: 99%   Weight: 159 lb 3.2 oz (72.2 kg)     Weight: 159 lb 3.2 oz (72.2 kg)        Physical Exam:  GENERAL APPEARANCE: A&A, NAD, well hydrated, well nourished  SKIN:  Normal skin turgor, no lesions/rashes   HEENT: moist mucous membranes, no rhinorrhea  NECK: Normal  CV: RRR, no M/G/R   LUNGS: CTAB  EXTREMITY: no edema, right knee effusion  NEURO: no gross deficits

## 2021-06-17 NOTE — PATIENT INSTRUCTIONS - HE
Eye Drops to Try:     Refresh  Systane    You may like the nighttime ointment as well to help with the dryness.       Please limit trazodone to no more than 3 pills with the nortriptyline.     Let me know if the Fiorinal is not covered.

## 2021-06-17 NOTE — TELEPHONE ENCOUNTER
Telephone Encounter by Shey Tilley RN at 1/7/2021  4:41 PM     Author: Shey Tilley RN Service: -- Author Type: Registered Nurse    Filed: 1/7/2021  5:41 PM Encounter Date: 1/7/2021 Status: Signed    : Shey Tilley RN (Registered Nurse)       ANTICOAGULATION  MANAGEMENT    Assessment     Today's INR result of 1.1 is Subtherapeutic (goal INR of 2.0-3.0)        Warfarin recently held as instructed which may be affecting INR    Increased greens/vitamin K intake may be affecting INR    Patient is finishing a prednisone taper in the next day or so    Continues to tolerate warfarin with no reported s/s of bleeding or thromboembolism     COVID + for the second time this past Tue (last + in May 2020)    Video visit tomorrow with PCP    Previous INR was Supratherapeutic    Plan:     Spoke on phone with Sandy regarding INR result and instructed:     Warfarin Dosing Instructions:  Boost dose today Thur 1/7 take 12.5mg then continue current warfarin dose    5 mg every Mon, Wed, Fri; 7.5 mg all other days         Instructed patient to follow up no later than: Possibly Tue 1/12, but TBD.     Discussed dosing and follow up with [facility] pharmacists Danieal Marino and Keiko Arguello. Due to extremely low TTR they request chart review for possible conversion to DOAC. Writer ACN discussed this with Sandy who is agreeable to discuss this further with [facility] pharmacist. New encounter opened.    Education provided: importance of consistent vitamin K intake, impact of vitamin K foods on INR, vitamin K content of foods, importance of therapeutic range, target INR goal and significance of current INR result, monitoring for bleeding signs and symptoms and monitoring for clotting signs and symptoms    Sandy verbalizes understanding and agrees to warfarin dosing plan.    Instructed to call the [facility] for any changes, questions or concerns. (#[phone])   ?   Shey Tilley RN    Subjective/Objective:      Sandy  ELSIE Spencer, a 80 y.o. female is on warfarin.     Sandy reports:     Home warfarin dose: verbally confirmed home dose with Snady and updated on anticoagulation calendar     Missed doses: No     Medication changes:  No, but will be finishing a prednisone taper in the next day or two.      S/S of bleeding or thromboembolism:  No     New Injury or illness:  Yes: COVID + this week     Changes in diet or alcohol consumption:  Yes: increased Vit K this week, she doesn't eat a lot of salads but she twice had asian salad mix with cabbage in it this week. She will go back to baseline intake     Upcoming surgery, procedure or cardioversion:  No    Anticoagulation Episode Summary     Current INR goal:  2.0-3.0   TTR:  15.6 % (1 y)   Next INR check:  1/12/2021   INR from last check:  1.10 (1/7/2021)   Weekly max warfarin dose:     Target end date:     INR check location:     Preferred lab:     Send INR reminders to:  [facility]    Indications    Other chronic pulmonary embolism without acute cor pulmonale (H) [I27.82]           Comments:           Anticoagulation Care Providers     Provider Role Specialty Phone number    Caitlyn Rees MD Referring Family Medicine [phone]

## 2021-06-17 NOTE — PROGRESS NOTES
Patient has been assigned a new care guide, Areli Cortes, discussed with patient today. Pt is coming into the clinic next week to see PCP. Pt has not completed [employer] mobility application. Care guide to follow up on that at next outreach.

## 2021-06-17 NOTE — TELEPHONE ENCOUNTER
Telephone Encounter by Chitra Owen at 3/18/2021 10:44 AM     Author: Chitra Owen Service: -- Author Type: Financial Resource Guide    Filed: 3/18/2021 10:48 AM Encounter Date: 3/16/2021 Status: Signed    : Chitra Owen (Financial Resource Guide)       See Assistance Approved  Medication: Praluent 75mg/ml Pens  Amount: $2200.00  Foundation: [employer]  Phone: [phone]  Fax: NA  Effective Dates: 12/18/2020 - 03/17/2022  Additional Information: JUAN

## 2021-06-17 NOTE — TELEPHONE ENCOUNTER
Telephone Encounter by Nhi Machado at 3/17/2021  4:43 PM     Author: Nhi Machado Service: -- Author Type: --    Filed: 3/17/2021  4:43 PM Encounter Date: 3/17/2021 Status: Signed    : Nhi Machado APPROVED:    Approval start date: 3/16/2021  Approval end date:  No exp date    Pharmacy has been notified of approval and will contact patient when medication is ready for pickup.

## 2021-06-17 NOTE — TELEPHONE ENCOUNTER
Telephone Encounter by Shey Tilley RN at 12/31/2020  5:30 PM     Author: Shey Tilley RN Service: -- Author Type: Registered Nurse    Filed: 12/31/2020  5:41 PM Encounter Date: 12/31/2020 Status: Signed    : Shey Tilley RN (Registered Nurse)       ANTICOAGULATION  MANAGEMENT    Assessment     Today's INR result of 4.2 is Supratherapeutic (goal INR of 2.0-3.0)        Warfarin taken as previously instructed    unable to fully assess for oral intake, patient marked no to changes on template but was recently seen for nausea which was noted to be stable/controlled with zofran    Interaction between prednisone taper and warfarin may be affecting INR. Per chart review patient has been on this for about 2 weeks and should have about 1 more week to go    Continues to tolerate warfarin with no reported s/s of bleeding or thromboembolism     Previous INR was Supratherapeutic    Plan:     Left detailed message for Sandy regarding INR result and instructed:     Warfarin Dosing Instructions:  hold today Thur 12/31 then change warfarin dose to    5 mg every Mon, Wed, Fri; 7.5 mg all other days      (10 % change)    Instructed patient to follow up no later than: 1 week at     Education provided: importance of therapeutic range, target INR goal and significance of current INR result, potential interaction between warfarin and prednisone, monitoring for bleeding signs and symptoms, when to seek medical attention/emergency care and importance of notifying clinic for diarrhea, nausea/vomiting, reduced intake and/or illness      Instructed to call the [facility] for any changes, questions or concerns. (#[phone])   ?   Shey Tilley RN    Subjective/Objective:      Sandy Spencer, a 80 y.o. female is on warfarin.     Sandy reports:     Home warfarin dose: as updated on anticoagulation calendar per template     Missed doses: No     Medication changes:  None reported on template, but patient  noted to be on prednisone x 3 weeks at previous INR check 2 weeks ago     S/S of bleeding or thromboembolism:  No     New Injury or illness:  No     Changes in diet or alcohol consumption:  No     Upcoming surgery, procedure or cardioversion:  No    Anticoagulation Episode Summary     Current INR goal:  2.0-3.0   TTR:  14.9 % (1 y)   Next INR check:  1/8/2021   INR from last check:  4.20 (12/31/2020)   Weekly max warfarin dose:     Target end date:     INR check location:     Preferred lab:     Send INR reminders to:  [facility]    Indications    Other chronic pulmonary embolism without acute cor pulmonale (H) [I27.82]           Comments:           Anticoagulation Care Providers     Provider Role Specialty Phone number    Caitlyn Rees MD Referring Family Medicine [phone]

## 2021-06-17 NOTE — PATIENT INSTRUCTIONS - HE
Patient Instructions by Caitlyn Rees MD at 3/19/2019  1:20 PM     Author: Caitlyn Rees MD Service: -- Author Type: Physician    Filed: 3/19/2019  2:29 PM Encounter Date: 3/19/2019 Status: Addendum    : Caitlyn Rees MD (Physician)    Related Notes: Original Note by Caitlyn Rees MD (Physician) filed at 3/19/2019  2:16 PM       For pain:   Make sure you are taking the hydroxyzine/vistaril, three times a day as written on the card  Please start Tylenol as well, two three times a day with the Vistaril  See Dr. Lynn as you can  I'm referring you to hematology/oncology to talk about thinning your blood with the blood clots and when you can have the filter removed  For your skin, I'm sending triamcinolone ointment to the pharmacy, consider doing some CeraVe or Aquaphor as well    Patient Education     Discharge Instructions for Chronic Kidney Disease (CKD)  Chronic kidney disease can (CKD) happen because of many things. These include infections, diabetes, high blood pressure, kidney stones, circulation problems, and reactions to medicine. Having kidney disease means making many changes in your life. Learn as much as you can about it so that you can better adjust to these changes. It is important to remember that the main goal of treatment is to stop CKD from progressing to complete kidney failure. Treatments may vary based on the progression of CKD. Always follow your healthcare provider's instructions on how to manage your condition.  Here are some things you can do to help your condition.  Diet changes  Always discuss your diet with your healthcare provider before making any changes.  Salt (sodium) in your diet    Based on your condition, you may be told to eat 1,500 mg or less of sodium daily    Limit processed foods such as:  ? Frozen dinners and packaged meals  ? Canned fish and meats  ? Pickled foods  ? Salted snacks  ? Lunch meats  ? Sauces  ? Most  cheeses  ? Fast foods    Don't add salt to your food while cooking or before eating at the table.    Eat unprocessed foods to lower the sodium, such as:  ? Fresh turkey and chicken  ? Lean beef  ? Unsalted tuna  ? Fresh fish  ? Fresh vegetables and fruits    Season foods with fresh herbs, garlic, onions, citrus, flavored vinegar, and sodium-free spice blends instead of salt when cooking.    Don't use salt substitutes that are high in potassium. Ask your healthcare provider or a registered dietitian which salt substitutes to use.    Don't drink softened water, because of the sodium content. Make sure to read the label on bottled water for sodium content.    Don't take over-the-counter medicines that contain sodium bicarbonate or sodium carbonate. Read labels carefully.  Potassium in your diet     Based on your condition, you may be told to eat less than 1,500 mg to 2,700 mg of potassium daily.    Always drain canned foods such as vegetables, fruits, and meats before serving.    Don't eat whole-grain breads, wheat bran, and granolas.    Don't eat milk, buttermilk, and yogurt.    Don't eat nuts, seeds, peanut butter, dried beans, and peas.    Don't eat fig cookies, chocolate, and molasses.    Don't use salt substitutes that are high in potassium. Ask your healthcare provider or a registered dietitian which salt substitutes to use.  Protein in your diet    Based on your condition, your healthcare provider will talk with you about why you should limit protein in your diet.    Cut back on protein. Eat less meat, milk products, yogurt, eggs, and cheese.  Phosphorus in your diet    Don't drink beer, cocoa, dark nicko, jodi, chocolate drinks, and canned ice teas.    Don't eat cheese, milk, ice cream, pudding, and yogurt.    Don't eat liver (beef, chicken), organ meats, oysters, crayfish, and sardines.    Don't eat beans (soy, kidney, black, garbanzo, and northern), peas (chick and split), bran cereals, nuts, and  caramels.  Eat small meals often that are high in fiber and calories. You may be told to limit how much fluid you drink.  Other home care    Try not to wear yourself out or get overly fatigued.    Get plenty of rest and get more sleep at night.    Move around and bend your legs to avoid getting blood clots when you rest for a long period of time.    Weigh yourself every day. Do this at the same time of day and in the same kind of clothes. Keep a record of your daily weights.    Take your medicines exactly as directed.    Keep all medical appointments.    Take steps to control high blood pressure or diabetes. Talk with your healthcare provider for advice.    Talk with your healthcare provider about dialysis. This procedure may help if your chronic kidney disease is progressing to end stage renal disease.  Follow-up care  Follow up with your healthcare provider, or as advised.     When to call your healthcare provider  Call your healthcare provider right away if you have any of the following:    Chest pain (call 911)    Trouble eating or drinking    Weight loss of more than 2 pounds in 24 hours or more than 5 pounds in 7 days    Little or no urine output    Trouble breathing    Muscle aches    Fever of 100.4 F (38 C) or higher, or as advised by your healthcare provider    Blood in your urine or stool    Bloody discharge from your nose, mouth, or ears    Severe headache or a seizure    Vomiting    Swelling of legs or ankles   Date Last Reviewed: 2/1/2017 2000-2017 [employer]. [address] [place] [address], [place] [address]. All rights reserved. This information is not intended as a substitute for professional medical care. Always follow your healthcare professional's instructions.

## 2021-06-17 NOTE — TELEPHONE ENCOUNTER
Telephone Encounter by Litzy Gore RN at 3/11/2021  3:22 PM     Author: Litzy Gore RN Service: -- Author Type: Registered Nurse    Filed: 3/11/2021  3:27 PM Encounter Date: 3/11/2021 Status: Attested    : Litzy Gore RN (Registered Nurse) Cosigner: Caitlyn Rees MD at 3/12/2021  7:24 AM    Attestation signed by Caitlyn Rees MD at 3/12/2021  7:24 AM    I have reviewed the notes, assessments, and/or procedures performed by NOAH Gore, I concur with her/his documentation of Sandy Spencer.                  ANTICOAGULATION  MANAGEMENT PROGRAM    Sandy Spencer is being discharged from the [facility] Program (ACM).    Reason for discharge: warfarin replaced by alternate therapy, Eliquis   ACN followed with patient and she reported that she is doing well with taking Eliquis at this time.  Patient made aware that she will be discharged from ACM Program at this time.    ACM referral closed, anticoagulation episode resolved and INR standing order discontinued.     If Sandy needs warfarin management in the future, please send a new referral.    Litzy Gore RN

## 2021-06-17 NOTE — TELEPHONE ENCOUNTER
Telephone Encounter by Aye Rice RN at 10/7/2020 10:59 AM     Author: Aye Rice RN Service: -- Author Type: Registered Nurse    Filed: 10/7/2020 11:39 AM Encounter Date: 10/7/2020 Status: Signed    : Aye Rice RN (Registered Nurse)       ANTICOAGULATION  MANAGEMENT    Assessment     Today's INR result of 1.8 is Subtherapeutic (goal INR of 2.0-3.0)        Warfarin taken as previously instructed    No new diet changes affecting INR    No interaction expected between zonisamide and warfarin - starting today    Continues to tolerate warfarin with no reported s/s of bleeding or thromboembolism     Previous INR was Subtherapeutic    Plan:     Spoke on phone with Sandy regarding INR result and instructed:     Warfarin Dosing Instructions:  Continue current warfarin dose 5 mg daily on Mondays, Wednesdays and Fridays; and 7.5 mg daily rest of week  (5.9 % change)    Instructed patient to follow up no later than: 2 weeks     Education provided: importance of therapeutic range, target INR goal and significance of current INR result, importance of following up for INR monitoring at instructed interval, importance of taking warfarin as instructed, importance of notifying clinic for changes in medications, when to seek medical attention/emergency care and importance of notifying clinic for diarrhea, nausea/vomiting, reduced intake and/or illness    Sandy verbalizes understanding and agrees to warfarin dosing plan.    Instructed to call the [facility] for any changes, questions or concerns. (#[phone])   ?   Aye Rice RN    Subjective/Objective:      Sandy Spencer, a 80 y.o. female is on warfarin.     Sandy reports:     Home warfarin dose: verbally confirmed home dose with Sandy  and updated on anticoagulation calendar     Missed doses: No     Medication changes:  No, zonisamide will start today      S/S of bleeding or thromboembolism:  No     New Injury or illness:  No      Changes in diet or alcohol consumption:  No     Upcoming surgery, procedure or cardioversion:  No    Anticoagulation Episode Summary     Current INR goal:  2.0-3.0   TTR:  20.5 % (1 y)   Next INR check:  10/21/2020   INR from last check:  1.80 (10/7/2020)   Weekly max warfarin dose:     Target end date:     INR check location:     Preferred lab:     Send INR reminders to:  [facility]    Indications    Other chronic pulmonary embolism without acute cor pulmonale (H) [I27.82]           Comments:           Anticoagulation Care Providers     Provider Role Specialty Phone number    Caitlyn Rees MD Referring Family Medicine [phone]

## 2021-06-17 NOTE — PROGRESS NOTES
Will add in amitritriptyline, will take no more than 3 trazodone with this.     Assessment & Plan     Post concussion syndrome  -Emekat was not covered by insurance, will trial Fiorinal. In addition to this, she has done well with nortriptyline in the past, will start this as well for her symptoms. She will f/u here in about 4 weeks for her next check. She will call if other concerns prior to this.   - butalbital-aspirin-caffeine (FIORINAL) -40 mg per capsule  Dispense: 30 capsule; Refill: 0  - nortriptyline (PAMELOR) 10 MG capsule  Dispense: 60 capsule; Refill: 2    Chronic kidney disease (CKD) stage G3a/A1, moderately decreased glomerular filtration rate (GFR) between 45-59 mL/min/1.73 square meter and albuminuria creatinine ratio less than 30 mg/g  -Will update labs for nephrology. She   - Comprehensive Metabolic Panel  - Hemoglobin    Claustrophobia  -She has an MRI scheduled and is quite claustrophobic. Will provide her with prescription for Valium as well.   - diazePAM (VALIUM) 5 MG tablet  Dispense: 2 tablet; Refill: 0        45 minutes spent on the date of the encounter doing chart review, history and exam, documentation and further activities per the note       Return in about 6 weeks (around 7/2/2021) for recheck.    Caitlyn Rees MD  [facility]   Sandy Spencer is 80 y.o. and presents today for the following health issues   HPI     She is working with the concussion clinic. She does have an MRI scheduled for next Friday. She has made some changes with being in a dark room 5 minutes an hour, as well as blinking. She does hope that her sleep will get better, is still struggling with this. She does feel like she has a tightness over the top of her head. She does still have the lump on her chin as well from hitting so hard. She has been massaging this. She does have soreness behind the brow bones are sore as well, thought to be due to her eyes  being irritated. This has been worked on PT. She is using drops for her eyes.     Her fiorinal is not covered. She has had 14 injections, did have 2 occipital injections initially. She did have more now lately, did just put novocaine in. She did have 8 initially and now has had four more yesterday.     She is sill getting nauseated. She is getting hiccups as well with the nausea. She will drive only a limited amount of distance.     Her blood pressure has been as high as 180-190. She does have some selling in her legs, thought to be from the lisinopril. She is taking the lisinopril at night and then a new one two times a day with food. The carvedilol, she took this last night for the first time. They are talking about putting her on a diuretic.     She is interested in weaning down on fentanyl. She does work with the pain clinic for this.     She does wonder about getting back on fosamax, her insurance is not covering the prolia. Does not think that she has been on BOniva.       Review of Systems    Per above      Objective    /64 (Patient Site: Left Arm, Patient Position: Sitting, Cuff Size: Adult Regular)   Pulse 69   Wt 143 lb 8 oz (65.1 kg)   SpO2 98%   BMI 25.42 kg/m    Body mass index is 25.42 kg/m .  Physical Exam    GENERAL APPEARANCE: A&A, NAD, well hydrated, well nourished  SKIN:  Normal skin turgor, no lesions/rashes   HEENT: moist mucous membranes, no rhinorrhea  NECK: No LAD  NEURO: no gross deficits   PSYCH: normal affect

## 2021-06-17 NOTE — TELEPHONE ENCOUNTER
Telephone Encounter by Chitra Owen at 3/16/2021  3:08 PM     Author: Chitra Owen Service: -- Author Type: Financial Resource Guide    Filed: 3/18/2021  9:44 AM Encounter Date: 3/16/2021 Status: Addendum    : Chitra Owen (Financial Resource Guide)    Related Notes: Original Note by Chitra Owen (Financial Resource Guide) filed at 3/16/2021  3:12 PM       Prior Authorization Approval  Medication: Praluent 75mg/ml  Qty: 2 pens for 28 days  Effective Dates: 03/16/21 until further notice   Reference Number: [id number]  Insurance Company: [employer] Part D  Pharmacy: pending   Expected Copay: $433.75  Copay Card Available: [facility] Assistance Needed/Available: [employer] joe open for $2500/yr  Patient Assistance Program Needed: available but requirements changed for the worse  Patient Notified? Not yet- RN needs to inform pt of med change  Pharmacy Notified? Not yet - need rx

## 2021-06-17 NOTE — TELEPHONE ENCOUNTER
Refill request: fentanyl 75 mcg  Last ov with [facility]: 4/20/21  Follow up in 8 weeks    UDT: 4/23/21   CSA 2/19/2020 (NEEDS UPDATING)     Next follow up with [facility]: 6/15/21    Appears this has already been sent to the pharmacy with a start date of 5/2/21

## 2021-06-17 NOTE — PROGRESS NOTES
"Sandy reviews an evaluation with a neurologist at the [facility].  They suggested increasing her fentanyl.  They gave her some sort of \"liquid made me goofy\".  She drove the wrong way down the street.  Reviewing the record she was placed on a trial of Tegretol to help with neuropathic pain.  She previously did poorly with Trileptal.  The record suggested increasing Suboxone, though Kerry is not taking that.    Apparently other surgery was recommended to be avoided given that she had problems with her gallbladder surgery, has had RSD.    She is seen by orthopedics and told she has a right knee meningeal tear.  She would like to get a brace.  She had physical therapy which made it feel worse.    She did have EMG which was negative for restless leg syndrome.  She is  using nortriptyline 50 mg which may be helpful, going from 3-5 hours a night of sleep.  She is not sure that is made her more tired if she is just fatigued from poor sleep.    She had described to the poor sleep her primary care provider has been trying to get authorizations for Lunesta.  For some reason the 2 mg did not get covered and they are trying to 3 mg.  Reviewed some concerns I have combining with the fentanyl.  She recalls taking Ambien 10 mg for several weeks while she was in the hospital.    She would like to use medical cannabis though has significant cost.    She has been [facility] the [facility] for supportive psychotherapy.    She has been using diazepam usually once a day in the morning, where it is a significant risk.    She saw  for evaluation of her back.  He did not offer any surgery.      Current Outpatient Prescriptions:      aspirin 81 MG EC tablet, Take 81 mg by mouth daily., Disp: , Rfl:      atorvastatin (LIPITOR) 80 MG tablet, Take 1 tablet (80 mg total) by mouth at bedtime., Disp: 90 tablet, Rfl: 3     azelastine (ASTEPRO) 0.15 % (205.5 mcg) Spry nasal spray, 1 spray by Left Nare route 2 (two) times a day as " needed., Disp: , Rfl:      cholecalciferol, vitamin D3, 5,000 unit Tab, Take 1 tablet by mouth daily., Disp: , Rfl:      diazePAM (VALIUM) 5 MG tablet, Take 1 tablet (5 mg total) by mouth every 8 (eight) hours as needed for anxiety (took dose before injection)., Disp: 15 tablet, Rfl: 0     diphenhydrAMINE (BENADRYL) 25 mg capsule, Take 25 mg by mouth every 6 (six) hours as needed. , Disp: , Rfl:      levothyroxine (SYNTHROID, LEVOTHROID) 50 MCG tablet, Take 1 tablet (50 mcg total) by mouth daily., Disp: 90 tablet, Rfl: 1     losartan (COZAAR) 25 MG tablet, Take 1 tablet (25 mg total) by mouth 2 (two) times a day., Disp: 180 tablet, Rfl: 1     naloxone (NARCAN) 4 mg/actuation nasal spray, 1 spray (4 mg dose) into one nostril for opioid reversal. Call 911. May repeat if no response in 3 minutes., Disp: 1 Box, Rfl: 0     psyllium (METAMUCIL) 3.4 gram packet, Take 1 packet by mouth 2 (two) times a day., Disp: , Rfl: 0     SUMAtriptan (IMITREX) 50 MG tablet, Take 1 tablet (50 mg total) by mouth every 2 (two) hours as needed for migraine., Disp: 27 tablet, Rfl: 1     traZODone (DESYREL) 100 MG tablet, TAKE 2 TO 4 TABLETS(200  MG) BY MOUTH AT BEDTIME, Disp: 360 tablet, Rfl: 0     verapamil (CALAN-SR) 240 MG CR tablet, Take 1 tablet (240 mg total) by mouth at bedtime., Disp: 90 tablet, Rfl: 1     diclofenac sodium (VOLTAREN) 1 % Gel, Apply twice a day as needed, Disp: 100 g, Rfl: 2     fentaNYL (DURAGESIC) 50 mcg/hr, Place 1 patch on the skin every third day., Disp: 10 patch, Rfl: 0     nortriptyline (PAMELOR) 50 MG capsule, Take 1 capsule (50 mg total) by mouth at bedtime., Disp: 30 capsule, Rfl: 2     OMEGA-3/DHA/EPA/FISH OIL (FISH OIL-OMEGA-3 FATTY ACIDS) 300-1,000 mg capsule, Take 1.2 g by mouth 2 (two) times a day., Disp: , Rfl:      spironolactone (ALDACTONE) 25 MG tablet, Take 1 tablet (25 mg total) by mouth daily., Disp: 90 tablet, Rfl: 1     zolpidem (AMBIEN) 10 mg tablet, Take 1 tablet (10 mg total) by mouth  "at bedtime as needed for sleep., Disp: 30 tablet, Rfl: 3    Current Facility-Administered Medications:      denosumab 60 mg (PROLIA 60 mg/ml), 60 mg, Subcutaneous, Q6 Months, Andrei Olivera MD, 60 mg at 02/09/18 1505  Blood pressure (!) 84/51, pulse 86, resp. rate 16, height 5' 2\" (1.575 m), weight 158 lb (71.7 kg), not currently breastfeeding.    Impression: Chronic pain has a history of right lower external a chronic regional pain syndrome, previous history of lumbar laminectomy, migraine headaches.    Mood disorder, significant insomnia.    Plan we will continue with nortriptyline changed to  one 50 mg tablet at bed time, observing bit longer if it is helping with sleep versus causing any extra fatigue.    She will reschedule Magali Ge psychotherapist that she had to cancel for sleep study.    She will call [facility] for evaluation see if she is a candidate for arthroscopic surgery rather than more full general surgery.    Given that she had been on Lunesta in the past though not on the fentanyl, while in the hospital more recently did report benefit with Ambien 10 mg I would rather have her use that.    She is encouraged to contact the medical cannabis dispensaries to see if the high CBD /low THC products have decreased to a more affordable price since last seen.    Time spent 25 minutes face-to-face more than 50% counseling about above condition and coordination of treatment plan  "

## 2021-06-17 NOTE — PATIENT INSTRUCTIONS - HE
Patient Instructions by Caitlyn Rees MD at 6/25/2019  3:00 PM     Author: Caitlyn Rees MD Service: -- Author Type: Physician    Filed: 6/25/2019  3:34 PM Encounter Date: 6/25/2019 Status: Signed    : Caitlyn Rees MD (Physician)       Patient Education     Eating a Vegetarian Diet  A vegetarian diet is based on plant foods. It includes fruits, vegetables, beans, grains, seeds, and nuts. Some vegetarians also eat dairy foods and eggs. There are 3 common vegetarian diets:    Lacto-ovo vegetarians eat eggs, yogurt, cheese, and other milk products, as well as plant foods.    Lacto vegetarians eat dairy and plant foods but not eggs.    Vegans eat only plant foods.  Why eat vegetarian?  People choose to be vegetarians for health, cultural, social, and Hinduism reasons. A vegetarian diet is a healthy way to eat. You just have to plan your meals carefully so that you get all the nutrients you need. Most vegetarian diets are high in fiber and low in fat and cholesterol. That means eating vegetarian can:    Lower your risk of heart disease    Lower your blood pressure and cholesterol levels    Help you stay at a healthy weight    Lower your risk of diabetes    Lower your risk of cancer    Decrease digestive problems including:  ? Bowel diseases  ? Gallstones  ? Colon cancer  Vegetarian basics  A vegetarian diet can be a healthy way to eat for people of all ages. But meals and snacks must be planned to include non-meat sources of protein, vitamins, and other nutrients. (See the chart below.) Here are some guidelines for healthy meal planning:    Eat a wide range of foods. This will help you get all the nutrients you need.    Eat a number of plant proteins throughout the day.    Plan for enough calories each day. Also make sure that your calories come from foods that are rich in protein, vitamins, and minerals.    If you eat dairy foods, choose low-fat or fat-free milk, yogurt,  or cheese.  Do you need supplements?  A vegetarian diet can easily supply all the calories, protein, vitamins, and minerals that a person needs. But some people have special needs. They may include children and teens, pregnant and lactating women, women past midlife, the elderly, and vegans. If you are in one of these groups, you may need extra calories, protein, calcium, iron, vitamin B12, or zinc. The lists below can help you choose foods that are good sources of these nutrients. And be sure to ask your healthcare provider about taking vitamin supplements.  Protein    Dried beans, soybeans, and lentils    Tofu (bean curd) and tempeh (cultured soybeans)    Rice, barley, and other whole grains    Nuts and nut butter    Milk, yogurt, and cheese    Eggs    Casein    Seitan (also called wheat gluten) Vitamin B12    Milk, yogurt, and cheese    Eggs    Fortified soy burgers    Fortified soy milk or other nondairy milk    Fortified cereals    Nutritional yeast   Zinc    Milk, yogurt, and cheese    Eggs    Canned or dried beans    Lentils and split peas    Wheat germ    Whole-grain breads and cereals    Nuts and nut butters    Pumpkin and sunflower seeds Calcium    Milk, yogurt, and cheese    Fortified soy milk or other nondairy milk    Tofu processed with calcium sulfate    Leafy, dark-green vegetables    Dried figs    Fortified orange juice and fortified cereals    Sesame seeds    Beans   Iron    Wheat germ    Dried fruits    Nuts and seeds    Whole grain and fortified breads and cereals    Dried beans, lentils, and split peas    Leafy, dark-green vegetables    Eggs     Getting started  Change to a vegetarian diet slowly. Start by eating more grains, beans, vegetables, and fruits. Make fish, poultry, or meat a side dish. Then slowly cut them out of your diet. Here are some other tips:    Eat 3 or more servings of vegetables a day. Eat them raw or lightly steamed.    Eat 2 or more servings of fruit a day. Choose whole  fruits with the skin on.    Choose a wide range of grains and whole-grain breads and cereals. Eat 6 or more servings of these foods each day.    Begin to replace meat by working up to 2 to 3 servings a day of beans, lentils, split peas, tofu, or tempeh.    If you eat dairy foods, have 2 to 3 servings a day. Make low-fat or fat-free choices.  For vegans: Add sources of calcium and vitamin B12, such as fortified nondairy milks and breakfast cereals. Talk with your healthcare provider about vitamin supplements.  To learn more  A registered dietitian (RD) can help you plan a healthy vegetarian diet. For more information and to find an RD who knows about vegetarian diets, search for one through the Vegetarian Nutrition Dietetic Practice Group of the Academy of Nutrition and Dietetics (AND) at their website, www.vegetariannutrition.net. You can also search AND's website, www.eatright.org. Other groups that can help include:    Vegetarian Resource Group (www.vrg.org)    American Cancer Society (www.cancer.org)    American Heart Association (www.heart.org)  Date Last Reviewed: 8/1/2017 2000-2017 [employer]. [address] [place] [address], [place] [address]. All rights reserved. This information is not intended as a substitute for professional medical care. Always follow your healthcare professional's instructions.

## 2021-06-17 NOTE — TELEPHONE ENCOUNTER
Telephone Encounter by Maru Lyon RN at 6/1/2020 11:19 AM     Author: Maru Lyon RN Service: -- Author Type: Registered Nurse    Filed: 6/1/2020 11:29 AM Encounter Date: 5/28/2020 Status: Signed    : Maru Lyon RN (Registered Nurse)       Call from pharmacist at [facility] stating pt is due for fill as below.  Script changed to start date of 06/02        .

## 2021-06-17 NOTE — TELEPHONE ENCOUNTER
Telephone Encounter by Aye Rice RN at 11/24/2020  1:43 PM     Author: Aye Rice RN Service: -- Author Type: Registered Nurse    Filed: 11/24/2020  3:24 PM Encounter Date: 11/24/2020 Status: Signed    : Aye Rice RN (Registered Nurse)       ANTICOAGULATION  MANAGEMENT    Assessment     Today's INR result of 1.6 is Subtherapeutic (goal INR of 2.0-3.0)        Warfarin taken as previously instructed    No new diet changes affecting INR    No new medication/supplements affecting INR    Continues to tolerate warfarin with no reported s/s of bleeding or thromboembolism     Previous INR was Subtherapeutic     Patient has had a prolonged period of non-therapeutic INRs, this has varied for months now. Writer provides dosing information, confirms patient is able to repeat back instructions and also confirms doses taken with each INR. It does not appear diet is causing a fluctuation. She did mention may be related to history of partial colectomy, doesn't appear this may hinder absorption. Will have PCP review for insight/recommendations.     Plan:     Spoke on phone with Sandy regarding INR result and instructed:     Warfarin Dosing Instructions:  Take one time booster dose warfarin 10 mg  then change warfarin dose to 10 mg daily on Fridays; and 7.5 mg daily rest of week  (10 % change)    Instructed patient to follow up no later than: one week    Education provided: importance of taking warfarin as instructed    Sandy verbalizes understanding and agrees to warfarin dosing plan.    Instructed to call the [facility] for any changes, questions or concerns. (#[phone])   ?   Aye Rice RN    Subjective/Objective:      Sandy Spencer, a 80 y.o. female is on warfarin.     Sandy reports:     Home warfarin dose: verbally confirmed home dose with Sandy  and updated on anticoagulation calendar     Missed doses: No     Medication changes:  No     S/S of bleeding or thromboembolism:  No      New Injury or illness:  No     Changes in diet or alcohol consumption:  No     Upcoming surgery, procedure or cardioversion:  No    Anticoagulation Episode Summary     Current INR goal:  2.0-3.0   TTR:  17.6 % (1 y)   Next INR check:  12/1/2020   INR from last check:  1.60 (11/24/2020)   Weekly max warfarin dose:     Target end date:     INR check location:     Preferred lab:     Send INR reminders to:  [facility]    Indications    Other chronic pulmonary embolism without acute cor pulmonale (H) [I27.82]           Comments:           Anticoagulation Care Providers     Provider Role Specialty Phone number    Caitlyn Rees MD Referring Family Medicine [phone]

## 2021-06-18 NOTE — PROGRESS NOTES
Several telephone calls about adjusting Seroquel.    Brain MRI came back at normal.    She reviews today that addition to being upset about not knowing where her son was for a while, her granddaughter who had substance abuse problems and called her intoxicated.  She swore at her.  She told her to get out of her life.  She just recalled this yesterday.  She thinks she was so upset that that prompted her to fall asleep and not wake up for several days.  She felt that pushed her into episode where she was very distressed.    She is now taking Seroquel 25 mg morning and noon and 50 mg at bedtime.  She shows her Fitbit which tracks her sleep, reflecting last night 9 hours was the first in a while that she is doing better.  She notes she is to make lists and be quite ruminative and that may be somewhat improved.    She reviews her frustrations with the Duragesic patch.  She had to remove one for the MRI and just had place that it for 12 hours.  There is no other MRI where she had to remove it.  Also when she went to the emergency room once came off in her. street clothes.    She will be seen Brinda Burns again in July.    She has not been taking the N-A-C supplement, we recommended this be better.    She notes the metal taste is left her mouth since we had discussed the nortriptyline which may have been leading to the dryness.      Current Outpatient Prescriptions:      aspirin 81 MG EC tablet, Take 81 mg by mouth daily., Disp: , Rfl:      atorvastatin (LIPITOR) 80 MG tablet, Take 1 tablet (80 mg total) by mouth at bedtime., Disp: 90 tablet, Rfl: 3     azelastine (ASTEPRO) 0.15 % (205.5 mcg) Spry nasal spray, 1 spray by Left Nare route 2 (two) times a day as needed., Disp: , Rfl:      cholecalciferol, vitamin D3, 5,000 unit Tab, Take 1 tablet by mouth daily., Disp: , Rfl:      diclofenac sodium (VOLTAREN) 1 % Gel, Apply twice a day as needed, Disp: 100 g, Rfl: 2     diphenhydrAMINE (BENADRYL) 25 mg capsule, Take 25 mg by  "mouth every 6 (six) hours as needed. , Disp: , Rfl:      fentaNYL (DURAGESIC) 50 mcg/hr, Place 1 patch on the skin every third day., Disp: 10 patch, Rfl: 0     levothyroxine (SYNTHROID, LEVOTHROID) 50 MCG tablet, Take 1 tablet (50 mcg total) by mouth daily., Disp: 90 tablet, Rfl: 1     losartan (COZAAR) 25 MG tablet, Take 1 tablet (25 mg total) by mouth 2 (two) times a day., Disp: 180 tablet, Rfl: 1     naloxone (NARCAN) 4 mg/actuation nasal spray, 1 spray (4 mg dose) into one nostril for opioid reversal. Call 911. May repeat if no response in 3 minutes., Disp: 1 Box, Rfl: 0     nortriptyline (PAMELOR) 50 MG capsule, Take 1 capsule (50 mg total) by mouth at bedtime., Disp: 30 capsule, Rfl: 2     OMEGA-3/DHA/EPA/FISH OIL (FISH OIL-OMEGA-3 FATTY ACIDS) 300-1,000 mg capsule, Take 1.2 g by mouth 2 (two) times a day., Disp: , Rfl:      psyllium (METAMUCIL) 3.4 gram packet, Take 1 packet by mouth 2 (two) times a day., Disp: , Rfl: 0     QUEtiapine (SEROQUEL) 25 MG tablet, Take 1 tab am, three tabs bedtime, Disp: 120 tablet, Rfl: 1     spironolactone (ALDACTONE) 25 MG tablet, Take 1 tablet (25 mg total) by mouth daily., Disp: 90 tablet, Rfl: 1     SUMAtriptan (IMITREX) 50 MG tablet, Take 1 tablet (50 mg total) by mouth every 2 (two) hours as needed for migraine., Disp: 27 tablet, Rfl: 1     traZODone (DESYREL) 100 MG tablet, TAKE 2 TO 4 TABLETS(200  MG) BY MOUTH AT BEDTIME, Disp: 360 tablet, Rfl: 0     verapamil (CALAN-SR) 240 MG CR tablet, Take 1 tablet (240 mg total) by mouth at bedtime., Disp: 90 tablet, Rfl: 1     zolpidem (AMBIEN) 10 mg tablet, Take 1 tablet (10 mg total) by mouth at bedtime as needed for sleep., Disp: 30 tablet, Rfl: 3    Current Facility-Administered Medications:      denosumab 60 mg (PROLIA 60 mg/ml), 60 mg, Subcutaneous, Q6 Months, Andrei Olivera MD, 60 mg at 02/09/18 1505  Blood pressure 121/71, pulse 80, resp. rate 16, height 5' 2\" (1.575 m), weight 157 lb (71.2 kg), not currently " breastfeeding.    In score 7.  She is alert with a clear sensorium good eye contact thought process tight logical.  Speech is less pressured, thoughts are more organized.  Appropriately groomed.    Session: Chronic pain includes a history of right lower extremity chronic regional pain syndrome, previous history of lumbar laminectomy, migraine headaches.    Has had a recent episode of what may appeared as a manic episode after 3 days of sleeping.  She reviews the history today this may have been more of a strong parasympathetic response to news from her daughter and granddaughter, then a manic episode which can also be conceived of a defense against very upsetting situation.  She reports now with the Seroquel perhaps having some sedation during the day    Plan: We will change the Seroquel to 25 mg 1 in the morning, or one half morning and noon and 3 at bedtime to conceive to consolidate some sleep.  She may stop the Lunesta and see how she does feeling tired the next day.    We will see if insurance covers fentanyl refill for a few days early on 5/21.    Resume and acetylcysteine 600 mg 3 times a day which may help decrease CRPS which she is worried about.    Time spent 25 minutes face-to-face more than 50% count about above condition coordination treatment plan

## 2021-06-18 NOTE — PROGRESS NOTES
Attempt 2: Care Guide called patient.  If this patient is returning my call, please transfer to Areli at ext [phone].    Next Outreach: 05/21/2018

## 2021-06-18 NOTE — LETTER
"Letter by Paz Schaefer CNP at      Author: Paz Schaefer CNP Service: -- Author Type: --    Filed:  Encounter Date: 3/4/2019 Status: (Other)         Patient: Sandy Spencer   MR Number: [id number]   YOB: 1940   Date of Visit: 3/4/2019       [facility]      NAME:  Sandy Spencer             :  1940    MRN: [id number]    CODE STATUS:  FULL CODE    FACILITY: [facility] [place] [[id number]]         CHIEF COMPLAIN/REASON FOR VISIT:  Chief Complaint   Patient presents with   ? Review Of Multiple Medical Conditions       HISTORY OF PRESENT ILLNESS: Sandy Spencer is a 78 y.o. female being seen today as a new admit to the TCU. She has a very lengthy and complex medical history. She recently presented to [facility] with hematuria, she had started on anticoagulation d/t a PE. She was then transferred to [facility]. Per her hospital records \" history coronary artery disease, chronic chronic pain syndrome, reflex sympathetic dystonia, subtotal colectomy secondary to bowel obstruction, hypothyroidism, recent recurrent pulmonary embolism on anticoagulation who presented to [facility] 19 with gross hematuria.  Patient developed gross hematuria after starting on anticoagulation for pulmonary embolism diagnosed 19. CT with delayed renal imaging revealed \"Stable 2 mm nonobstructing right renal interpolar and 1 mm left renal upper pole nonobstructing calculi. No ureteral calculi. Stable 3 mm amorphous left renal mid to upper pole hyperdensity (series 2, image 34).\"  She underwent underwent cystoscopy on 2/10/19 with cytology concerning for high-grade urothelial cancer.   Right ureter fluid cytology was suspicious for high-grade urothelial carcinoma.  The left ureteral orifice biopsy simply revealed blood clot without any evidence of malignancy (of note, the operative report does not mention any interventions on the left ureter -perhaps this was " "mislabeled?).  She underwent  S/P Right robotic nephrectomy on 2/20.  Surgical pathology exam reported severe hemarthrosis and urothelial atypia, multifocal including scattered foci of urothelial dysplasia.  No diagnostic evidence of in situ or invasive carcinoma.  Ureteral and vascular margins were negative for tumor.  There is acute and chronic pyelonephritis, patchy chronic and acute urethritis, benign tubular cyst.\" Her abdomen is soft and she has several steri strips in place. She is on MS 7.5 mg, reduced in hospiatl from 15 mg q 6 prn and she has anxiety this will not controll her pain. She has tylenol listed as an allegy, and when I brought this up she reports she doesn't like tylenol as it doesn't do any good. I explained that the MS caused lethargy and she herself reports she was in a coma in the hospital, it is difficult to get a good history as pt is digressing in her conversation.    Allergies   Allergen Reactions   ? Ambien [Zolpidem] Other (See Comments)     Sleep walking   ? Campral [Acamprosate]      Nausea, vomiting and headache   ? Carbamazepine Other (See Comments)     Patient felt \"drunk\".    ? Cymbalta [Duloxetine] Anaphylaxis     Throat closes   ? Lyrica [Pregabalin] Anaphylaxis     Throat closes   ? Sulfa (Sulfonamide Antibiotics) Anaphylaxis          ? Lamotrigine Other (See Comments) and Dizziness     Fatigue. Now re-trying at a low dose to see if tolerates   ? Amiloride Unknown     unknown   ? Amoxicillin Unknown   ? Aspirin Other (See Comments)     History of gastric ulcers and instructed to not take more than 81 mg/d, 10/5/17 takes 81 mg at home   ? Augmentin [Amoxicillin-Pot Clavulanate] Diarrhea and Nausea And Vomiting   ? Blood-Group Specific Substance      Anti-E, Jka present. Expect delays in blood for transfusion.  Draw 2 lavender  for type and screen orders.   ? Chromium And Derivatives Other (See Comments)     Staples: Reaction to all metals.States serious reactions after surgery  "   ? Flexeril [Cyclobenzaprine] Other (See Comments)     Mouth ulcers   ? Labetalol Unknown   ? Metoprolol Unknown   ? Mirtazapine Other (See Comments)     Troubles catching breath.   ? Nsaids (Non-Steroidal Anti-Inflammatory Drug) Other (See Comments)     H/o ulcers   ? Other Allergy (See Comments) Unknown     SURGICAL STAPLES  STEROIDS (was told not to take because of concern of RE CHF)  SSRI's  Metal Staples   ? Other Drug Allergy (See Comments)       and Staples: turned black into the groin, was told to never have staples again   ? Oxycodone Headache   ? Serotonin Unknown     Rebound headaches   ? Serzone [Nefazodone] Headache     Tolerates trazodone    ? Tolmetin Other (See Comments)     History of ulcer   ? Tylenol [Acetaminophen] Headache     Rebound headaches   ? Verapamil Other (See Comments)     Bradycardia   ? Cortisone Other (See Comments)     Overuse with a lot of injections, recommended to try something else if needed due to osteopenia   ? Depakote [Divalproex] Rash   ? Sulfacetamide Sodium Rash   :     Current Outpatient Medications   Medication Sig   ? acetaminophen (TYLENOL) 500 MG tablet Take 500 mg by mouth 3 (three) times a day.   ? hydrOXYzine pamoate (VISTARIL) 25 MG capsule Take 25 mg by mouth 3 (three) times a day.   ? calcium, as carbonate, (TUMS) 200 mg calcium (500 mg) chewable tablet Chew 1 tablet (200 mg total) 3 (three) times a day as needed.   ? cholecalciferol, vitamin D3, 5,000 unit Tab Take 1 tablet by mouth daily with supper.          ? ciclopirox (PENLAC) 8 % solution Apply over nail and surrounding skin. Apply daily over previous coat. After seven (7) days, may remove with alcohol and continue cycle. (Patient taking differently: Apply topically daily as needed. Apply over nail and surrounding skin. Apply daily over previous coat. After seven (7) days, may remove with alcohol and continue cycle      )   ? diclofenac sodium (VOLTAREN) 1 % Gel Apply 1 g topically 2 (two) times a day  as needed (to knees).   ? diphenoxylate-atropine (LOMOTIL) 2.5-0.025 mg per tablet Take 1 tablet by mouth 4 (four) times a day as needed for diarrhea.   ? fentaNYL (DURAGESIC) 50 mcg/hr Place 1 patch on the skin every third day.   ? food supplemt, lactose-reduced (ENSURE ORIGINAL) Liqd Take 118 mL by mouth daily.   ? furosemide (LASIX) 20 MG tablet Take 1 tablet (20 mg total) by mouth every other day.   ? GENERLAC 10 gram/15 mL solution TK 30ML PO QD (Patient taking differently: Take 30 mL by mouth daily as needed. TK 30ML PO QD      )   ? Lactobacillus acidoph-L.bulgar (FLORANEX) 1 million cell Tab tablet Take 1 tablet by mouth daily.   ? lamoTRIgine (LAMICTAL) 5 MG TChD Take 1 tablet (5 mg total) by mouth 4 (four) times a day as needed (anxiety).   ? levothyroxine (LEVO-T) 50 MCG tablet Take 1 tablet (50 mcg total) by mouth Daily at 6:00 am.   ? loperamide (IMODIUM) 2 mg capsule Take 2 mg by mouth 4 (four) times a day as needed for diarrhea .         ? morphine (MSIR) 15 MG tablet Take 0.5 tablets (7.5 mg total) by mouth every 6 (six) hours as needed.   ? naloxone (NARCAN) 4 mg/actuation nasal spray 1 spray (4 mg dose) into one nostril for opioid reversal. Call 911. May repeat if no response in 3 minutes.   ? omeprazole (PRILOSEC) 40 MG capsule Take 1 capsule (40 mg total) by mouth daily before breakfast.   ? oxybutynin (DITROPAN) 5 MG tablet Take 1 tablet (5 mg total) by mouth 3 (three) times a day as needed (for bladder spasms).   ? promethazine (PHENERGAN) 12.5 MG tablet Take 1 tablet (12.5 mg total) by mouth every 6 (six) hours as needed for nausea.   ? rosuvastatin (CRESTOR) 40 MG tablet Take 1 tablet (40 mg total) by mouth daily.   ? senna-docusate (PERICOLACE) 8.6-50 mg tablet Take 2 tablets by mouth daily as needed.   ? SUMAtriptan (IMITREX) 50 MG tablet Take 1 tablet (50 mg total) by mouth every 2 (two) hours as needed for migraine.   ? topiramate (TOPAMAX) 50 MG tablet Take 0.5-1 tablets (25-50 mg total)  by mouth 2 (two) times a day.   ? traZODone (DESYREL) 100 MG tablet Take 2 tablets (200 mg total) by mouth at bedtime as needed for sleep.   ? UNABLE TO FIND Take 1 tablet by mouth 3 (three) times a day. Med Name: DGL PLUS 760 mg deglycyrrhizinated licorice plus 100 mg glycine               REVIEW OF SYSTEMS:    Currently, no fever, chills, or rigors. Does not have any visual or hearing problems. Denies any chest pain, headaches, palpitations, lightheadedness, dizziness, shortness of breath, or cough. Appetite is good. Denies any GERD symptoms. Denies any difficulty with swallowing, nausea, or vomiting.  Reports abdominal pain,no  diarrhea or constipation. Denies any urinary symptoms. No insomnia. No active bleeding. No rash.       PHYSICAL EXAMINATION:  Vitals:    03/05/19 0601   BP: 100/63   Pulse: 74   Temp: 98.1  F (36.7  C)         GENERAL: Awake, Alert, oriented x3, has episodes of anger and tearfulness this am  HEENT: Head is normocephalic with normal hair distribution. No evidence of trauma. Ears: No acute purulent discharge. Eyes: Conjunctivae pink with no scleral jaundice. Nose: Normal mucosa and septum.  CHEST: No tenderness or deformity, no crepitus  LUNG: Clear to auscultation with good chest expansion. There are no crackles or wheezes, normal AP diameter.  BACK: No kyphosis of the thoracic spine. Symmetric, no curvature, ROM normal, no CVA tenderness, no spinal tenderness   CVS: There is good S1  S2, there are no murmurs, rubs, gallops, or heaves, rhythm is regular.  ABDOMEN: Globular and soft, nontender to palpation, non distended, no masses, no organomegaly, good bowel sounds, no rebound or guarding, no peritoneal signs.   EXTREMITIES: Atraumatic. Full range of motion on both upper and lower extremities, there is no tenderness to palpation, no pedal edema, no cyanosis or clubbing, no calf tenderness, normal cap refill, no joint swelling.  SKIN: Warm and dry, no erythema noted, no rashes or  lesions.Surgical puncture wounds to abdomen with steri strips.  NEUROLOGICAL: The patient is oriented to person, place and time. Has anxiety.                    LABS:    Lab Results   Component Value Date    WBC 6.1 03/03/2019    HGB 9.5 (L) 03/03/2019    HCT 28.5 (L) 03/03/2019    MCV 92 03/03/2019     03/03/2019       Results for orders placed or performed during the hospital encounter of 02/13/19   Basic Metabolic Panel   Result Value Ref Range    Sodium 137 136 - 145 mmol/L    Potassium 4.1 3.5 - 5.0 mmol/L    Chloride 106 98 - 107 mmol/L    CO2 26 22 - 31 mmol/L    Anion Gap, Calculation 5 5 - 18 mmol/L    Glucose 88 70 - 125 mg/dL    Calcium 7.9 (L) 8.5 - 10.5 mg/dL    BUN 18 8 - 28 mg/dL    Creatinine 1.16 (H) 0.60 - 1.10 mg/dL    GFR MDRD Af Amer 55 (L) >60 mL/min/1.73m2    GFR MDRD Non Af Amer 45 (L) >60 mL/min/1.73m2           Lab Results   Component Value Date    HGBA1C 5.2 01/26/2018     Vitamin D, Total (25-Hydroxy)   Date Value Ref Range Status   01/04/2019 39.8 30.0 - 80.0 ng/mL Final     No results found for: [id number]    ASSESSMENT/PLAN:  1. Other acute pulmonary embolism without acute cor pulmonale (H)    2. Moderate episode of recurrent major depressive disorder (H)    3. Reflex sympathetic dystrophy      1. PE: Currently anticoagulation is on hold, to follow with hematology for PE and f/u after TCU, IVF may be removed at that time.    2. Depression/anxiety; I am ordering tylenol with vistaril to assist with pain and this may assist her with the anxiety. Spoke to SW and request ACP services as well while in TCU.    3.Reflex Sympathetic Dystrophy: She is requesting increase in MS due to discomfort, see #2. I also told her we cannot assist her with the high doses of MS she is requesting and I suggest she follow back up with pain clinic for some other modalities that may assist her pain plan. She would like to see them, request this get set up with the [facility]      Electronically signed by:  Paz  AMIE Schaefer  This progress note was completed using Dragon software and there may be grammatical errors.      50  minutes spent of which greater than  30 minutes which was  was face to face communication with the patient about above plan of care including her POLST , her complex medical condition as reported in her HPI as well as her pain management requests.

## 2021-06-18 NOTE — PROGRESS NOTES
Scheduled F/U Call: Attempt 2   Attempt 2: Care Guide called patient.  If this patient is returning my call please transfer to Milvia Byrd at ext  [phone]  Next [facility] Outreach: 6/28/18

## 2021-06-18 NOTE — PATIENT INSTRUCTIONS - HE
Patient Instructions by Caitlyn Rees MD at 6/30/2020 10:00 AM     Author: Caitlyn Rees MD Service: -- Author Type: Physician    Filed: 6/30/2020  2:13 PM Encounter Date: 6/30/2020 Status: Addendum    : Caitlyn Rees MD (Physician)    Related Notes: Original Note by Caitlyn Rees MD (Physician) filed at 6/30/2020  2:11 PM       Patient Education     Diet for Chronic Kidney Disease  Following a special diet when you have kidney disease can help you stay as healthy as possible. Your healthcare provider or dietitian should make a special diet plan just for you.    Eating right  Here are some good eating rules to follow:    Protein. Eating protein is important for your body. But too much protein can put a strain on your kidneys. Eating less protein may slow the progression of chronic kidney disease. Foods high in protein include meat, fish, eggs, cheese, and other dairy products. A registered dietitian can help you plan a diet that has the right amount of protein for you.    Sodium. Having too much salt in your diet can make your body hold onto (retain) water. Ask your provider or dietitian how much sodium per day you are allowed. This will help you avoid fluid buildup in your body (fluid retention). It can also help control high blood pressure. Learn to read food labels to know how much sodium is in one serving. Foods high in salt include processed meats, canned and boxed foods, sauces, salted chips and snacks, pickled foods, frozen dinners, and restaurant and fast food.    Fluids. If you have advanced kidney disease, you will need to limit the water and fluids you drink. If you dont, then too much water will build up in your body. The exact amount of fluid you can drink depends on how well your kidneys are working. Ask your provider how much water you can safely drink each day.    Potassium. In advanced kidney disease, your potassium level can go  dangerously high. This affects your heart. It can cause an irregular heartbeat (arrhythmia). Ask your provider or dietitian if you should limit potassium in your diet. Foods high in potassium include dairy products (milk, yogurt, cheese), dried beans, bananas, oranges, potatoes, tomatoes, spinach, cantaloupe, honeydew melon, dried fruits, and nuts.     Calcium. Calcium is important to build strong bones. But foods high in calcium are also high in phosphorus, which can take calcium from your bones. Limiting foods high in phosphorus will help keep calcium in your bones. Ask your provider how much calcium you should get each day.    Phosphorus. In advanced kidney disease, your phosphorus level can go dangerously high. This affects many systems in the body and can damage your heart. Limit your intake of phosphorus-rich foods. These include dried beans and peas, nuts, peanut butter, cocoa, beer, cola drinks, and dairy products.  Date Last Reviewed: 8/1/2016 2000-2017 [employer]. [address] [place] [address]. All rights reserved. This information is not intended as a substitute for professional medical care. Always follow your healthcare professional's instructions.    Patient Education     Kidney Disease: Getting the Right Amount of Protein     One portion (3 to 4 ounces) of fish, chicken, or red meat is about the size of a deck of playing cards.   Your body needs protein to build and repair muscles and bones along with other important body functions. But as the body uses protein, a waste product (blood urea nitrogen or BUN) is produced. If your kidneys cant filter wastes from your blood normally, the BUN level increases. If the level gets too high, you can become sick. Because of this, you need to control the amount of protein you eat each day. Use this handout to help you.     Measuring protein content  You know how many grams of protein to eat, but most food portions are measured in  ounces. Use the chart below to help determine the protein content of some common foods.  Chicken breast 3 to 4 ounces 21 to 28 grams   Chicken thigh 2 to 2.5 ounces 14 to 18 grams   Fish 3 ounces 21 grams   Pork chop 2 to 2.5 ounces 14 to18 grams   Roast beef 3 ounces 21 grams   Steak 3 to 4 ounces 21 to 28 grams   Hamburger 3 to 4 ounces 21 to 28 grams   Eggs 1 egg 7 grams   Cheese 1 ounce 7 grams   Most beans 4 ounces 7 to 10 grams   Tofu 2 ounces 5 grams   Most nuts 2 ounces 5 to 8 grams      If you eat too much protein  Eating too much protein may cause the following:       Nausea, vomiting    Fatigue    Mental confusion    Increased potassium levels    Increased phosphorus levels     Increased time on hemodialysis    Risk of speeding the loss of kidney function  If you eat too little protein  Eating too little protein may cause the following:    Muscle loss, weakness    Fatigue    Weight loss    Slower wound healing      Date Last Reviewed: 1/1/2017 2000-2019 [employer]. [address] [place] [address]. All rights reserved. This information is not intended as a substitute for professional medical care. Always follow your healthcare professional's instructions.    I'd look at this as guidelines of where to start. You do NOT have advanced kidney disease at this time.   You probably need about 46-50 grams of protein a day.

## 2021-06-18 NOTE — LETTER
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 mask, sterile gown, and sterile gloves during the entire procedure. PROCEDURE/FINDINGS:  The patient's right neck was sterilely prepped and draped. After giving local lidocaine anesthesia, a 21 gauge needle was used to puncture the right internal jugular vein from a middle transverse approach under ultrasound guidance with an image stored for the record. A 0.018 inch wire was advanced through the needle into the central veins, as confirmed fluoroscopically. The needle was then exchanged over the wire for a 5 Irish transitional coaxial dilator. The 0.018 inch wire and  inner 3 Irish dilator were then exchanged for a 0.035 inch wire, which was advanced into the inferior vena cava under fluoroscopic guidance. The dilator was then exchanged over the wire for a 5 Irish vascular sheath. A 5 Irish Omni Flush catheter was  manipulated into the IVC, and directed below the level of the IVC filter. Digital subtraction inferior venacavogram was then performed covering the mid abdomen demonstrating no active extravasation however the confluence of the right gonadal vein and IVC was identified. The Omni Flush catheter was exchanged over the Amplatz wire for a 5 Irish multipurpose catheter. The multipurpose catheter was used to select the right  gonadal vein. Digital subtraction angiography was performed confirming its proper position. With the aid of a 0.035 inch angled Glidewire the multipurpose catheter was directed into the mid aspect of the right gonadal vein. Digital subtraction venogram was performed which demonstrated a myriad of small veins entering the retroperitoneum. Note was also made of active extravasation of contrast which may be related to injection of contrast into the terminal vein. Embolization was performed throughout this right gonadal vein using multiple embolization coils (three 3/5 tornado coils, four 3 mm x 7 cm Agustin coils, three 3 mm x 14 cm Agustin coils, and one 4 mm x 3 cm Agustin coil), both without and with the aid of a  2.7 Greenlandic Progreat microcatheter. The catheter was retracted and repeat angiography was performed which demonstrated satisfactory embolization of the right gonadal vein. The catheter balloon is then retracted into the inferior vena cava and repeat venography was performed. This demonstrated satisfactory embolization of the right gonadal vein without evidence of contrast reflux into the gonadal vein itself. The catheter and sheath were removed and hemostasis was achieved with manual compression.     1. Successful embolization of right gonadal vein. These findings were discussed with Dr. Wall immediately following the procedure. CPT code: 55424 [id number] x 3    Ir Ivc Filter Placement    Result Date: 2/15/2019  [facility] RADIOLOGY LOCATION: [facility] DATE: 2/14/2019 PROCEDURE: INFERIOR VENA CAVOGRAM AND INFERIOR VENA CAVA FILTER PLACEMENT 1.  Ultrasound-guided access of the right internal jugular vein. A permanent image was stored. 2.  Digital subtraction inferior cavography. 3.  Inferior vena cava filter placement. 4.  Moderate sedation. INTERVENTIONAL RADIOLOGIST: Yrn Arciniega MD INDICATION: Venous thromboembolism with contraindication to anticoagulation. Plan  for retrievable inferior vena cava filter placement. CONSENT: The risks, benefits and alternatives of retrievable inferior vena cava filter placement were discussed with the patient  in detail. All questions were answered. Informed consent was given to proceed with the procedure. MODERATE SEDATION: Versed 0 mg IV; Fentanyl 25 mcg IV. During the time out, immediately prior to the administration of medications, the patient was reassessed for adequacy to receive conscious sedation.  Under physician supervision, Versed and fentanyl were administered for moderate sedation. Pulse oximetry, heart rate and blood pressure were continuously monitored by an independent trained observer. The physician spent 15 minutes of face-to-face sedation time with the patient. CONTRAST: 50 mL Omnipaque 350 ANTIBIOTICS: None. ADDITIONAL MEDICATIONS: None. FLUOROSCOPIC TIME: 3.2 minutes. RADIATION DOSE: Air Kerma: 127 mGy. COMPLICATIONS: No immediate complications. STERILE BARRIER TECHNIQUE: Maximum sterile barrier technique was used. Cutaneous antisepsis was performed at the operative site with application of 2% chlorhexidine and large sterile drape. Prior to the procedure, the  and assistant performed hand hygiene and wore hat, mask, sterile gown, and sterile gloves during the entire procedure. PROCEDURE:  The right internal jugular vein was punctured under real-time ultrasound guidance. A 5 Fijian pigtail catheter was positioned in the IVC. A diagnostic inferior vena cavagram was obtained to evaluate for caval thrombus, renal vein anatomy/anomalies and location, caval anatomy/anomalies and diameter. Following the diagnostic study, a Shelly retrievable inferior vena caval filter was deployed just below the lowest renal vein. Post placement fluoroscopy demonstrated appropriate position. The sheath was then removed and compression applied to the puncture site until hemostasis was achieved. FINDINGS: Ultrasound shows an anechoic and  compressible jugular vein. The diagnostic inferior vena cavagram shows a normal-caliber vena cava without anatomic anomaly or thrombus.     1. Successful placement of an inferior vena cava filter, as discussed above. PLAN: A retrievable filter was placed. Please contact the department of Interventional Radiology ([phone]) once the filter is no longer medically necessary to have the filter removed. CPT codes for physician reference only: 37737 [id number]    Xr Retrograde Pyelogram W Or Wo Kub Intraoperative    Result Date: 2/16/2019  XR RETROGRADE PYELOGRAM W OR WO KUB INTRAOPERATIVE 2/16/2019 12:03 PM INDICATION: Hydronephrosis. Ureteral stent exchange. TECHNIQUE: Exam performed by Urologist. COMPARISON: CT 06/15/2019 FLUOROSCOPIC TIME: .6 minutes NUMBER OF IMAGES: 1 FINDINGS: The proximal pigtail of the ureteral stent now projects over the renal pelvis. Contrast in mildly distended calyces and renal pelvis. Elliptical puddle of contrast adjacent the proximal ureteral stent. Caval filter.    Ct Abdomen Pelvis Without Oral With Iv Contrast    Addendum Date: 2/22/2019    ADDENDUM: The suspected active bleeding vein of the right retroperitoneum may be the right gonadal vein. The images were reviewed with Dr. Casillas. END ADDENDUM    Result Date: 2/22/2019  [facility] RADIOLOGY EXAM: CT ABDOMEN PELVIS WO ORAL W IV CONTRAST LOCATION: [facility] DATE/TIME: 2/22/2019 5:03 PM INDICATION: Injury abdominal organs decrease bp, increse laura output COMPARISON: 02/15/2019. TECHNIQUE: Helical enhanced thin-section CT scan of the abdomen and pelvis was performed following injection of IV contrast. Multiplanar reformats were obtained. Dose reduction techniques were used. CONTRAST: Iohexol (Omni) 75mL FINDINGS: LUNG BASES: Negative. ABDOMEN: Surgical changes of right nephroureterectomy including rather extensive soft tissue emphysema of the included lower thoracic, abdominal, and pelvic wall. Moderate volume of  free fluid throughout the abdomen and pelvis. A portion of the fluid has  relatively low density although there are components layering dependently that have higher density and consistent with the presence of blood products. Discrete high density collections in the right retroperitoneum lateral to the psoas muscle in the abdomen and near the right iliac bifurcation in the pelvis consistent with hematoma measure about 5.1 x 3.1 x 5.6 cm and 4.7 x 2.3 x 5.0 cm respectively (images 40-48 and 57-64 of axial series 2). Notably, the collection lateral to the right psoas has horizontally layering hyperdense material within it consistent with active hemorrhage (image 45 of axial series 2). Additionally, there is a small amount of ill-defined hyperdense material along the anterior margin of the nearby right psoas muscle also consistent with active hemorrhage (images 40-48 of axial series 2, 59-64 of coronal series 400.2). This material is in proximity to a small vein extending from the inferior nephrectomy bed to the IVC, probably a lower pole accessory renal vein, suggesting it is the source of hemorrhage (e.g. coronal image 61-62 of series 400.2). A surgical percutaneous drain enters via the midline ventral pelvic wall and passes through the right retroperitoneum terminating inferior to the right hepatic lobe. Inferior vena cava filter is present with superior apex near the confluence of the left renal vein with IVC. Patient is post cholecystectomy. A few tiny probable hepatic cysts. Spleen, pancreas, adrenal glands, and left kidney are negative. PELVIS: Patient is post subtotal colectomy, appendectomy, and hysterectomy. MUSCULOSKELETAL: Negative.     CONCLUSION: 1.  Surgical changes of right nephroureterostomy. 2.  Evidence for active bleeding likely arising from a small right accessory renal vein. 3.  Moderate volume of hemorrhagic ascites in the abdomen and pelvis. More discrete hematoma formation in the right  retroperitoneum. I discussed the findings with Dr. Casillas of Urology at 5:20 PM on 02/22/2019.     Ct Abdomen Pelvis Without Oral With Iv Contrast    Result Date: 2/15/2019  [facility] RADIOLOGY EXAM: CT ABDOMEN PELVIS WO ORAL W IV CONTRAST LOCATION: [facility] DATE/TIME: 2/15/2019 5:43 PM INDICATION: Abd pain, fever, abscess suspected fevers COMPARISON: 02/07/2019 CT TECHNIQUE: Helical enhanced thin-section CT scan of the abdomen and pelvis was performed following injection of IV contrast. Multiplanar reformats were obtained. Dose reduction techniques were used. CONTRAST: Iohexol (Omni) 75mL FINDINGS: LUNG BASES: Negative. ABDOMEN: New right nephromegaly hydronephrosis, and abnormal renal enhancement compatible with hilar nephritis. There is a ureteral stent, however the proximal pigtail catheter is in the proximal ureter, below the UPJ. Tiny right renal nonobstructive stone. Normal-appearing left kidney, adrenals, spleen, and pancreas. Cholecystectomy. Stable intrahepatic bile duct prominence. Stable small hepatic cysts. Caval filter. Circumaortic left renal vein. PELVIS: Right ureteral stent pigtail in bladder. Monae catheter. Small bowel appears normal. Absent left colon. Surgical staple line right colon. No adenopathy. MUSCULOSKELETAL: Spinal degenerative change.     CONCLUSION: 1.  New right hydronephrosis and pyelonephritis. 2.  Proximal ureteral pigtail malpositioned in the proximal ureter. 3.  Nonobstructive right renal stone.     Ct Abdomen Pelvis Without Oral With Without Iv Contrast    Result Date: 2/7/2019  [facility] RADIOLOGY EXAM: CT ABDOMEN PELVIS WO ORAL W WO IV CONTRAST LOCATION: [facility] DATE/TIME: 2/7/2019 7:42 PM INDICATION: Hematuria. COMPARISON: 01/29/2019. TECHNIQUE: Helical thin-section CT scan of the abdomen and pelvis was performed without contrast. Images were then obtained during and after injection of IV contrast, with delayed images through the renal collecting systems.  Multiplanar reformats were obtained. Dose reduction techniques were used.? CONTRAST: Iohexol (Omni) 100 mL. FINDINGS: LUNG BASES: Trace right pleural effusion. Minimal basilar atelectasis/scarring. ABDOMEN: Stable 2 mm nonobstructing right renal interpolar and 1 mm left renal upper pole nonobstructing calculi. No ureteral calculi. Stable 3 mm amorphous left renal mid to upper pole hyperdensity (series 2, image 34). Tiny renal hypodensities may represent cysts, too small to characterize. No ureteral filling defect. Minimal accumulation of contrast in the bladder on delayed imaging without definitive wall thickening. Multiple hepatic hypodensities up to 10 mm. Larger ones are suggestive of cysts, though others too small to accurately characterize. Cholecystectomy. Unremarkable pancreas, spleen and adrenals. Severely atherosclerotic aorta. Incidental circumaortic left  renal vein. No free air or adenopathy. PELVIS: Monae catheter in urinary bladder. Surgical changes bowel. Subtotal colectomy. Small and large bowel fluid. Minimal free fluid. MUSCULOSKELETAL: Negative.     CONCLUSION: 1.  Stable tiny nonobstructing renal calculi. No ureteral or bladder calculi. No hydronephrosis. 2.  Tiny renal hypodensities, too small to accurately characterize. This may represent tiny cysts. 3.  No ureteral or definitive bladder filling defect; though incomplete distention of the bladder on delayed imaging with Monae catheter in place. 4.  Air-fluid levels in bowel; correlate for enteritis. No obstruction. 5.  Other findings in the report.         CHRISTINA Mercado

## 2021-06-18 NOTE — LETTER
Letter by Caitlyn Rees MD at      Author: Caitlyn Rees MD Service: -- Author Type: --    Filed:  Encounter Date: 1/8/2019 Status: (Other)       Sandy Spencer  [address]  [facility] [address]             January 8, 2019         Dear Ms. Spencer,    Below are the results from your recent visit:    Resulted Orders   Erythrocyte Sedimentation Rate   Result Value Ref Range    Sed Rate 7 0 - 20 mm/hr   C-Reactive Protein   Result Value Ref Range    CRP 0.1 0.0 - 0.8 mg/dL   Rheumatoid Factor Quant   Result Value Ref Range    Rheumatoid Factor Quantitative <15.0 0 - 30 IU/mL   Antinuclear Antibodies Screen (CINDY)   Result Value Ref Range    CINDY Screen Cascade 0.6 <=2.9 U    Narrative    <1.0 negative  1.1-2.9 weakly positive  3.0-5.9 positive ( reflex)  > or=6.0 strongly positive   Vitamin D, Total (25-Hydroxy)   Result Value Ref Range    Vitamin D, Total (25-Hydroxy) 39.8 30.0 - 80.0 ng/mL    Narrative    Deficiency <10.0 ng/mL  Insufficiency 10.0-29.9 ng/mL  Sufficiency 30.0-80.0 ng/mL  Toxicity (possible) >100.0 ng/mL   HM1 (CBC with Diff)   Result Value Ref Range    WBC 5.0 4.0 - 11.0 thou/uL    RBC 3.99 3.80 - 5.40 mill/uL    Hemoglobin 12.8 12.0 - 16.0 g/dL    Hematocrit 38.3 35.0 - 47.0 %    MCV 96 80 - 100 fL    MCH 32.2 27.0 - 34.0 pg    MCHC 33.5 32.0 - 36.0 g/dL    RDW 11.9 11.0 - 14.5 %    Platelets 168 140 - 440 thou/uL    MPV 7.9 7.0 - 10.0 fL    Neutrophils % 54 50 - 70 %    Lymphocytes % 34 20 - 40 %    Monocytes % 9 2 - 10 %    Eosinophils % 2 0 - 6 %    Basophils % 0 0 - 2 %    Neutrophils Absolute 2.7 2.0 - 7.7 thou/uL    Lymphocytes Absolute 1.7 0.8 - 4.4 thou/uL    Monocytes Absolute 0.5 0.0 - 0.9 thou/uL    Eosinophils Absolute 0.1 0.0 - 0.4 thou/uL    Basophils Absolute 0.0 0.0 - 0.2 thou/uL   T3 (Triiodothyronine), Free   Result Value Ref Range    T3, Free 2.1 1.9 - 3.9 pg/mL       Your labs are all normal showing no signs of rheumatoid  arthritis, infection and your vitamin D is normal.     Please call with questions or contact us using MyChart.    Sincerely,        Electronically signed by Caitlyn Rees MD

## 2021-06-18 NOTE — PROGRESS NOTES
Pt states that she doesn't believe that she has any need to be enrolled in [facility] any longer.  But has decided to discuss this with PCP before removing herself from our program.  CG will reach out to pt after her appt to see what she has decided.    Next Outreach: 05/24/2018

## 2021-06-18 NOTE — PATIENT INSTRUCTIONS - HE
Patient Instructions by Caitlyn Rees MD at 2/9/2021 11:20 AM     Author: Caitlyn Rees MD Service: -- Author Type: Physician    Filed: 2/9/2021 12:17 PM Encounter Date: 2/9/2021 Status: Addendum    : Caitlyn Rees MD (Physician)    Related Notes: Original Note by Caitlyn Rees MD (Physician) filed at 2/9/2021 11:53 AM       We are working hard to begin vaccinating more people against COVID-19. Currently, we are only vaccinating individuals age 75 and older and Phase 1a workers - healthcare workers who are unable to do their job remotely. Vaccine availability is very limited.    If you are 75 or older, or a healthcare worker who is unable to do your job remotely, please log in to [employer] using this link to schedule an appointment.  If there are no appointments left, you will be unable to schedule and need to check back later.  If you are a healthcare worker, you will be asked to provide proof of employment at your appointment. If you cannot, you will be turned away.    Vaccine appointments are being added as they become available. Please check your [employer] account frequently for availability. If you have technical difficulty using [employer], call [phone] for assistance.    You can learn more about the [facility]'s phased approach to administering the vaccine, with details on each phase, [email or web address].      Aa vaccine supply increases and we are able to open appointments to more groups, we will share that information on our website [email or web address]. Check this website to stay up to date on COVID-19 vaccination information.    C[facility]:   Please be aware that coverage of these services is subject to the terms and limitations of your health insurance plan.  Call member services at your health plan with any benefit or coverage questions.     If you have not heard from  the scheduling office within 2 business days, please call [phone].       Patient Education     Blepharitis    Blepharitis is an inflammation of the eyelid. It results in swelling of the eyelids, and it is usually caused by a bacterial infection or a skin condition. Blepharitis is a common eye condition. There are two types. Anterior blepharitis occurs where the eyelashes are attached (outside front edge of the eye). Posterior blepharitis affects the inner edge of the eyelid that touches the eyeball.  In addition to swollen eyelids, symptoms of blepharitis can include thick, yellow, dandruff-like scales that stick to the eyelid. There may be oily patches on the eyelid. The eyelashes may be crusted (with dandruff-like scales) when you wake up from sleeping. The irritated area may itch. The eyelids may be red. The eyes can be red and burn or sting. The eyes may tear a lot, or be dry. You can become sensitive to light or have blurred vision. Symptoms of blepharitis can cause irritability.  Blepharitis is a chronic condition and difficult to cure. Even with successful treatment, recurrences are common. Good hygiene and home treatments (in the Home care section below) can improve your condition.  Causes  Causes of blepharitis may include:    Problems with the oil glands in the eyelid (meibomian glands)    Dandruff of the scalp and eyebrows (seborrheic dermatitis)    Acne rosacea (a skin condition that causes redness of the face, and other symptoms)    Eyelash mites (tiny organisms in the eyelash follicles)    Allergic reactions to cosmetics or medicines  Home care  Medicine: The healthcare provider may prescribe an antibiotic eye drops or ointment, artificial tears, and/or steroid eye drops. Follow all instructions for using these medicines. Use all medicines as directed. If you have pain, take medicine as advised by the healthcare provider.    Wash your hands carefully with soap and warm water before and after  caring for your eyes.    Apply a warm compress or a warm, moist washcloth to the eyelids for 1 minute, 2 to 3 times a day, to loosen the crust. Then, wipe away scales or crust from the eyelids.    After applying the warm compress, gently scrub the base of the eyelashes for almost 15 seconds per eyelid. To do this, close your eyes and use a moist eyelid cleansing wipe, clean washcloth, or cotton swab. Ask your healthcare provider about products (such as nonirritating baby shampoo) to use to help clean the eyelids.    You may be instructed to gently massage your eyelids to help unblock the eyelid glands. Follow all instructions given by the healthcare provider.    Unless told otherwise, on a regular basis, with eyes closed, clean your eyelids as directed by the healthcare provider. Blepharitis can be an ongoing problem.    Do not wear eye makeup until the inflammation goes away, or as directed by your healthcare provider.    Unless told otherwise, stop using contact lenses until you complete treatment for the condition.    Wash your hands regularly to help prevent dirt and bacteria from coming in contact with your eyelid.  Follow-up care  Follow up with your healthcare provider, or as advised. Your healthcare provider may refer you to an eye specialist (an optometrist or ophthalmologist) for further evaluation and treatment.  When to seek medical advice  Call your healthcare provider right away if any of these occur:    Increase in redness of the white part of the eye    Increase in swelling, redness, irritation, or pain of the eyelids    Eye pain    Change in vision (trouble seeing or blurring)    Drainage (pus, blood) from the eyelid    Fever of 100.4 F (38 C) or higher, or as directed by your healthcare provider  Date Last Reviewed: 10/9/2015    [id number] [employer] [address] [place] [address], [place] [address]. All rights reserved. This information is not intended as a substitute for professional  medical care. Always follow your healthcare professional's instructions.    For Eye drops: You can try Refresh or Systane eye drops OR there is a dry eye nighttime gel that you could use.

## 2021-06-18 NOTE — LETTER
Letter by Paz Schaefer CNP at      Author: Paz Schaefer CNP Service: -- Author Type: --    Filed:  Encounter Date: 3/13/2019 Status: (Other)         Patient: Sandy Spencer   MR Number: [id number]   YOB: 1940   Date of Visit: 3/13/2019     [facility]      NAME:  Sandy Spencer             :  1940  MRN: [id number]  CODE STATUS:  FULL CODE    VISIT TYPE: DISCHARGE SUMMARY  FACILYTY: [facility] [[id number]]                    PRIMARY CARE PROVIDER: Caitlyn Rees MD   HOSPITALIZATION: [facility],  to 3/3/19    DISCHARGE DIAGNOSIS:      1. Chronic kidney disease (CKD) stage G3a/A1, moderately decreased glomerular filtration rate (GFR) between 45-59 mL/min/1.73 square meter and albuminuria creatinine ratio less than 30 mg/g (H)    2. History of blood clots         DISCHARGE MEDICATIONS:         Medication List           Accurate as of 3/13/19  1:42 PM. If you have any questions, ask your nurse or doctor.               CHANGE how you take these medications    ciclopirox 8 % solution  Commonly known as:  PENLAC  Apply over nail and surrounding skin. Apply daily over previous coat. After seven (7) days, may remove with alcohol and continue cycle.  What changed:      how to take this    when to take this    reasons to take this    additional instructions     GENERLAC 10 gram/15 mL solution  Generic drug:  lactulose  TK 30ML PO QD  What changed:      how much to take    how to take this    when to take this    reasons to take this    additional instructions        CONTINUE taking these medications    acetaminophen 500 MG tablet  Commonly known as:  TYLENOL     calcium (as carbonate) 200 mg calcium (500 mg) chewable tablet  Commonly known as:  TUMS  Chew 1 tablet (200 mg total) 3 (three) times a day as needed.     cholecalciferol (vitamin D3) 5,000 unit Tab     diclofenac sodium 1 % Gel  Commonly known as:  VOLTAREN      diphenoxylate-atropine 2.5-0.025 mg per tablet  Commonly known as:  LOMOTIL  Take 1 tablet by mouth 4 (four) times a day as needed for diarrhea.     fentaNYL 50 mcg/hr  Commonly known as:  DURAGESIC  Place 1 patch on the skin every third day.     food supplemt, lactose-reduced Liqd  Commonly known as:  ENSURE ORIGINAL  Take 118 mL by mouth daily.     furosemide 20 MG tablet  Commonly known as:  LASIX     hydrOXYzine HCl 10 MG tablet  Commonly known as:  ATARAX     Lactobacillus acidoph-L.bulgar 1 million cell Tab tablet  Commonly known as:  FLORANEX  Take 1 tablet by mouth daily.     levothyroxine 50 MCG tablet  Commonly known as:  LEVO-T  Take 1 tablet (50 mcg total) by mouth Daily at 6:00 am.     loperamide 2 mg capsule  Commonly known as:  IMODIUM     morphine 15 MG tablet  Commonly known as:  MSIR  Take 0.5 tablets (7.5 mg total) by mouth every 6 (six) hours as needed.     naloxone 4 mg/actuation nasal spray  Commonly known as:  NARCAN  1 spray (4 mg dose) into one nostril for opioid reversal. Call 911. May repeat if no response in 3 minutes.     omeprazole 40 MG capsule  Commonly known as:  PriLOSEC  Take 1 capsule (40 mg total) by mouth daily before breakfast.     promethazine 12.5 MG tablet  Commonly known as:  PHENERGAN  Take 1 tablet (12.5 mg total) by mouth every 6 (six) hours as needed for nausea.     rosuvastatin 40 MG tablet  Commonly known as:  CRESTOR  Take 1 tablet (40 mg total) by mouth daily.     senna-docusate 8.6-50 mg tablet  Commonly known as:  PERICOLACE  Take 2 tablets by mouth daily as needed.     SUMAtriptan 50 MG tablet  Commonly known as:  IMITREX  Take 1 tablet (50 mg total) by mouth every 2 (two) hours as needed for migraine.     topiramate 50 MG tablet  Commonly known as:  TOPAMAX  Take 0.5-1 tablets (25-50 mg total) by mouth 2 (two) times a day.     traZODone 100 MG tablet  Commonly known as:  DESYREL  Take 2 tablets (200 mg total) by mouth at bedtime as needed for sleep.       "  STOP taking these medications    lamoTRIgine 5 MG Tchd  Commonly known as:  LaMICtal  Stopped by:  Paz Schaefer CNP     UNABLE TO FIND  Stopped by:  Paz Schaefer CNP            HISTORY OF PRESENT ILLNESS: Sandy Spencer is a 78 y.o. female being seen for upcoming dc in am. She has several health concerns including back pain and fatigue , feels urinary difficulties may be due to an UTI, I will then obtain a UA and explained this could be treated OP. She also has c/o fatigue, we will do a stat HBG this am to assure she is stable for dc tomorrow. She has a very lengthy and complex medical history. She recently presented to [facility] with hematuria, she had started on anticoagulation d/t a PE. She was then transferred to [facility]. Per her hospital records \" history coronary artery disease, chronic chronic pain syndrome, reflex sympathetic dystonia, subtotal colectomy secondary to bowel obstruction, hypothyroidism, recent recurrent pulmonary embolism on anticoagulation who presented to [facility] 2/7/19 with gross hematuria.  Patient developed gross hematuria after starting on anticoagulation for pulmonary embolism diagnosed 1/26/19. CT with delayed renal imaging revealed \"Stable 2 mm nonobstructing right renal interpolar and 1 mm left renal upper pole nonobstructing calculi. No ureteral calculi. Stable 3 mm amorphous left renal mid to upper pole hyperdensity (series 2, image 34).\"  She underwent underwent cystoscopy on 2/10/19 with cytology concerning for high-grade urothelial cancer.   Right ureter fluid cytology was suspicious for high-grade urothelial carcinoma.  The left ureteral orifice biopsy simply revealed blood clot without any evidence of malignancy (of note, the operative report does not mention any interventions on the left ureter -perhaps this was mislabeled?).  She underwent  S/P Right robotic nephrectomy on 2/20.  Surgical pathology exam reported severe hemarthrosis and urothelial " "atypia, multifocal including scattered foci of urothelial dysplasia.  No diagnostic evidence of in situ or invasive carcinoma.  Ureteral and vascular margins were negative for tumor.  There is acute and chronic pyelonephritis, patchy chronic and acute urethritis, benign tubular cyst.\" Her abdomen is soft and she has several steri strips in place. She is on MS 7.5 mg, reduced in hospiatl from 15 mg q 6 prn and she has anxiety this will not controll her pain.      SKILLED NURSING FACILITY COURSE:  During this TCU stay, patient completed all anticipated goals of therapy.      PHYSICAL EXAMINATION:    Vitals:    03/13/19 1324   BP: 123/75   Pulse: 78   Temp: 99  F (37.2  C)   Weight: 134 lb 3.2 oz (60.9 kg)         GENERAL: Awake, Alert, oriented x3, not in any form of acute distress, answers questions appropriately, follows simple commands, conversant  HEENT: Head is normocephalic with normal hair distribution. No evidence of trauma. Ears: No acute purulent discharge. Eyes: Conjunctivae pink with no scleral jaundice. Nose: Normal mucosa and septum.  CHEST: No tenderness or deformity, no crepitus  LUNG: Clear to auscultation with good chest expansion. There are no crackles or wheezes, normal AP diameter.  BACK: No kyphosis of the thoracic spine. Symmetric, no curvature, ROM normal, no CVA tenderness, no spinal tenderness   CVS: There is good S1  S2, rhythm is regular.  ABDOMEN: Globular and soft, nontender to palpation, non distended, no masses, no organomegaly, good bowel sounds, no rebound or guarding, no peritoneal signs.   EXTREMITIES: Atraumatic. Full range of motion on both upper and lower extremities, there is no tenderness to palpation, positive  pedal edema,  normal cap refill, no joint swelling.  SKIN: Warm and dry, no erythema noted, no rashes or lesions.  NEUROLOGICAL: The patient is oriented to person, place and time. Strength and sensation are grossly intact. Face is symmetric.      LABS:  All labs " reviewed in the nursing home record.        DISCHARGE PLAN: I certify that this patient is under Dr. Painting's care, seen by the NP, and had a face-to-face encounter that meets the physician face-to-face encounter requirements.  The encounter was in whole, or part related to the primary reason for home health.  The Patient is homebound due to: Deconditioned state, CKD and chronic pain, and  it is  taxing and it will take a considerable amount of effort for patient to leave the home.  She is dependent on others for transportation.  The patient is confined to her home and needs intermittent skilled nursing, PT,OT, RN and HHA.  The patient has been under the care of Dr. Painting/NP and Dr. Painting  initiated the establishment of the plan of care.        Patient to be followed by home care for physical therapy to eval and treat for strengthening, balance, endurance, and safety with mobility, and ambulation.  Patient to be followed by home care for occupational therapy to eval and treat for strengthening, ADL needs, adaptive equipment, and safety.  Patient to be followed by home care for nursing services for medication set up and teaching, symptom and disease processes monitoring and education.    Patient to be followed by home care for home health aid services for bathing and ADL needs.  Patient will follow up with PCP within 7  days after discharge for medication mangagment and appropriate lab studies.    Nursing requested to set up MD tejeda with Dr aLng, [facility] nephrologist      Patient received a hard script for  MS 15 mg tabs, 6 day supply sendt from TCU and hard RX written for an additional 6 tabs.  Facility sending Lyrica and  Duragesic     DC pending stable HBG today.        Electronically signed by:  Paz Schaefer CNP  This progress note was completed using Dragon software and there may be grammatical errors.      For documentation purposes, chart review, medication management, and discharge coordination  of care was greater than 35 minutes     Attestation signed by Anusha Painting MBBS at 3/13/2019  3:56 PM:  Agree with discharge plan  Patient advised to closely follow-up with a urologist, nephrologist as well as primary care physician and pain management clinic.

## 2021-06-18 NOTE — PROGRESS NOTES
Per chart review, patient wanted to discuss with PCP if she should continue with [facility]. Called patient today and asked for a call back if she was still interested in remaining in [facility]. Attempt 1: Care Guide called patient.  If this patient is returning my call, please transfer to Suzie at ext [phone].

## 2021-06-18 NOTE — PROGRESS NOTES
"We reviewed if some of the changes from last week, possibly related to her change in nortriptyline.  She had increased nortriptyline gradually to 50 mg which she had thought she had been on the past.  She began to develop a horrible metal taste in her mouth.  She wondered if whether stopping threw other things off.  She had tolerated this for her RSD in the past.    She describes then having experienced for 3 days where she she slept excessively.  She  reviewed  her Fitbit readings.    She did start taking Seroquel 25 one half tablet, the next night took a whole tablet and slept 7 hours.    I inquired about bipolar symptoms.  She describes this had been evaluated the past at [facility] and told that she was not bipolar, there were just times she \"could not stop talking\".    She has gotten some CBD oil.  She is asking about taking that, eventually acknowledges it is her granddaughter who is interested in taking it for some other conditions.  We discussed use as I understood it with that concentration.    She is used Kinesio tape at times her right knee which is still, swollen.  She wonders if she should see a different orthopedist to consider surgery as her significant pain.  The more recent orthopedist was uncomfortable with doing surgery given her history of chronic regional pain syndrome.    She has been scheduled for an MRI for her knee and her back to her primary care provider.    Clarified she had been on Serzone in the past  for cluster headaches, not Seroquel.    She did see the neurologist again recently, she did not review with them she was not taking the Tegretol.  When she did take it she recalls getting confused and driving the wrong way.      Current Outpatient Prescriptions:      aspirin 81 MG EC tablet, Take 81 mg by mouth daily., Disp: , Rfl:      atorvastatin (LIPITOR) 80 MG tablet, Take 1 tablet (80 mg total) by mouth at bedtime., Disp: 90 tablet, Rfl: 3     azelastine (ASTEPRO) 0.15 % (205.5 mcg) " Spry nasal spray, 1 spray by Left Nare route 2 (two) times a day as needed., Disp: , Rfl:      cholecalciferol, vitamin D3, 5,000 unit Tab, Take 1 tablet by mouth daily., Disp: , Rfl:      diclofenac sodium (VOLTAREN) 1 % Gel, Apply twice a day as needed, Disp: 100 g, Rfl: 2     diphenhydrAMINE (BENADRYL) 25 mg capsule, Take 25 mg by mouth every 6 (six) hours as needed. , Disp: , Rfl:      levothyroxine (SYNTHROID, LEVOTHROID) 50 MCG tablet, Take 1 tablet (50 mcg total) by mouth daily., Disp: 90 tablet, Rfl: 1     losartan (COZAAR) 25 MG tablet, Take 1 tablet (25 mg total) by mouth 2 (two) times a day., Disp: 180 tablet, Rfl: 1     naloxone (NARCAN) 4 mg/actuation nasal spray, 1 spray (4 mg dose) into one nostril for opioid reversal. Call 911. May repeat if no response in 3 minutes., Disp: 1 Box, Rfl: 0     nortriptyline (PAMELOR) 50 MG capsule, Take 1 capsule (50 mg total) by mouth at bedtime., Disp: 30 capsule, Rfl: 2     OMEGA-3/DHA/EPA/FISH OIL (FISH OIL-OMEGA-3 FATTY ACIDS) 300-1,000 mg capsule, Take 1.2 g by mouth 2 (two) times a day., Disp: , Rfl:      psyllium (METAMUCIL) 3.4 gram packet, Take 1 packet by mouth 2 (two) times a day., Disp: , Rfl: 0     spironolactone (ALDACTONE) 25 MG tablet, Take 1 tablet (25 mg total) by mouth daily., Disp: 90 tablet, Rfl: 1     SUMAtriptan (IMITREX) 50 MG tablet, Take 1 tablet (50 mg total) by mouth every 2 (two) hours as needed for migraine., Disp: 27 tablet, Rfl: 1     traZODone (DESYREL) 100 MG tablet, TAKE 2 TO 4 TABLETS(200  MG) BY MOUTH AT BEDTIME, Disp: 360 tablet, Rfl: 0     verapamil (CALAN-SR) 240 MG CR tablet, Take 1 tablet (240 mg total) by mouth at bedtime., Disp: 90 tablet, Rfl: 1     zolpidem (AMBIEN) 10 mg tablet, Take 1 tablet (10 mg total) by mouth at bedtime as needed for sleep., Disp: 30 tablet, Rfl: 3     [START ON 5/21/2018] fentaNYL (DURAGESIC) 50 mcg/hr, Place 1 patch on the skin every third day., Disp: 10 patch, Rfl: 0     QUEtiapine  "(SEROQUEL) 25 MG tablet, Take one and one half tab at HS x 5 nights, may increase to two tabs as tolerated, Disp: 30 tablet, Rfl: 0    Current Facility-Administered Medications:      denosumab 60 mg (PROLIA 60 mg/ml), 60 mg, Subcutaneous, Q6 Months, Andrei Olivera MD, 60 mg at 02/09/18 1505  Blood pressure 144/81, pulse 89, resp. rate 16, height 5' 2\" (1.575 m), weight 159 lb (72.1 kg), not currently breastfeeding.    In score 7.  She is alert with a clear sensorium appropriately groomed.  She is quite verbal with pressured speech, with some awareness that she is talking a lot pauses and catches herself to allow me to speak.  Rather tangential in her thought process.    Impression: Chronic pain with a history of right lower extremity chronic regional pain syndrome, previous history of lumbar laminectomy, migraine headaches.    Has had a mood disorder, more recently complaining of significant insomnia, for which we had added in nortriptyline which she recalled taking in the past with some benefit.  Of concern the nortriptyline may have precipitated some mood cycling that she has appeared rather hypomanic, observed by her primary care provider as well.  The rapid discontinuation of the nortriptyline due to complaints of a metal taste in her mouth, possibly related to dry mouth and anticholinergic symptoms, may have also precipitated some mood instability.    She is started using some Seroquel perhaps some benefit.  She continues symptomatic.      PLAN: I suggested she use Seroquel 25 mg one half in the morning one half noon and could increase to 2 at bedtime, call back next week.    She is continue work on a referral to a different orthopedist.    I did review with her doses for the CBD oil more hypothetically as she is not planning to get herself.    Time spent 25 minutes face-to-face more than 50% count about above condition and coordination treatment plan  "

## 2021-06-18 NOTE — PROGRESS NOTES
ASSESSMENT/PLAN:       1. Bradycardia  -She has noted a few episodes of feeling like her heart rate has been low which has been confirmed on her Fitbit and she tells me with feeling her pulses well.  It is normal today.  We did discuss that this certainly could be secondary to the verapamil, and I am having her cut this in half to see if this helps alleviate some of her symptoms.  -Is if not, we will consider referral for a Holter monitor as well as possibly cardiology.  She did have a stress test just over a year ago which was normal, and she had an EKG just last October which was normal.    2. Cluster headaches  -Unfortunately she is a long-standing history of headaches and has been on verapamil for this.  Having her decrease this and will watch for worsening headaches, consider referral back to neurology if needed.    3. Mixed hyperlipidemia  -Labs are up-to-date, continue on current medication    4. Osteopenia  -She is overdue for bone density scan, this was updated today.  - DXA Bone Density Scan; Future    5. Other specified hypothyroidism  -Given her slower heart rate I am checking her thyroid to make sure this is adequately replaced.  - Thyroid [place]    6. Benign essential hypertension  -Updating labs, she is doing well with the addition of the spironolactone.  - Basic Metabolic Panel        Caitlyn Rees MD      PROGRESS NOTE   6/22/2018    SUBJECTIVE:  Sandy Specner is a 77 y.o. female  who presents for follow up.     She does note that she is sleeping a bit better than she had been. She is noting that her pulse is dropping at times. She did feel the pulse drop, she felt like she was somewhat dizzy with this. She is taking the verapamil at night.  She is wondering if this could be secondary to the verapamil.  She has been on this for some time.  She does worry about heart failure as both of her parents had heart failure.  She denies any issues with breathing other than when she was on the  Seroquel.  She denies any issues with swelling in her ankles.  She is very limited mobility secondary to severe arthritis in her knees.    She denies any issues with the addition of the spironolactone to help control her blood pressure.    She did see orthopedics. They did not think that the meniscus is causing issues. She did get an injection there and was recommended that she get shots every 3 months. She has had the rooster comb injection in the past. She did have a cortisone shot and it didn't help at all. She did have a Synvisc injection done in February.   Chief Complaint   Patient presents with     Follow-up     Patient is here today for her one month follow up.      Irregular Heart Beat     Patient states her pulse rate has been low, had a reading of 43 the other day. She does feel a lack of engery and light headed as well.          Patient Active Problem List   Diagnosis     Chronic kidney disease (CKD) stage G3a/A1, moderately decreased glomerular filtration rate (GFR) between 45-59 mL/min/1.73 square meter and albuminuria creatinine ratio less than 30 mg/g     Focal glomerular sclerosis     Anxiety and depression     RSD lower limb, bilateral     ADD (attention deficit disorder)     RSD upper limb, right     Other specified hypothyroidism     Anxiety     Osteopenia     Major depression     Hypertension     Insomnia     Mixed hyperlipidemia     Right rotator cuff tear     Cluster headaches     Lumbar radiculopathy     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     Reflex sympathetic dystrophy     Acute encephalopathy     Temporomandibular joint-pain-dysfunction syndrome (TMJ)     Pancreatitis     Localized swelling of lower leg     Medical cannabis use     Acute right-sided low back pain without sciatica     Acute exacerbation of chronic low back pain     Acute pancreatitis     Accelerated hypertension     Acquired hypothyroidism     Chronic pain syndrome     Non morbid obesity due to excess calories      Snoring     Inadequate pain control     Diarrhea     Dehydration     Abdominal pain     Dermatochalasis of left eyelid       Current Outpatient Prescriptions   Medication Sig Dispense Refill     acetylcysteine, bulk, Powd 600 mg capsules, take orally three times a day 42 Bottle 0     aspirin 81 MG EC tablet Take 81 mg by mouth daily.       atorvastatin (LIPITOR) 80 MG tablet Take 1 tablet (80 mg total) by mouth at bedtime. 90 tablet 3     azelastine (ASTEPRO) 0.15 % (205.5 mcg) Spry nasal spray 1 spray by Left Nare route 2 (two) times a day as needed.       cholecalciferol, vitamin D3, 5,000 unit Tab Take 1 tablet by mouth daily.       diphenhydrAMINE (BENADRYL) 25 mg capsule Take 25 mg by mouth every 6 (six) hours as needed.        fentaNYL (DURAGESIC) 50 mcg/hr Place 1 patch on the skin every third day. 10 patch 0     levothyroxine (SYNTHROID, LEVOTHROID) 50 MCG tablet Take 1 tablet (50 mcg total) by mouth daily. 90 tablet 0     OMEGA-3/DHA/EPA/FISH OIL (FISH OIL-OMEGA-3 FATTY ACIDS) 300-1,000 mg capsule Take 1.2 g by mouth 2 (two) times a day.       psyllium (METAMUCIL) 3.4 gram packet Take 1 packet by mouth 2 (two) times a day.  0     spironolactone (ALDACTONE) 25 MG tablet Take 1 tablet (25 mg total) by mouth daily. 90 tablet 1     SUMAtriptan (IMITREX) 50 MG tablet Take 1 tablet (50 mg total) by mouth every 2 (two) hours as needed for migraine. 27 tablet 1     traZODone (DESYREL) 100 MG tablet TAKE 2 TO 4 TABLETS(200  MG) BY MOUTH AT BEDTIME 360 tablet 0     verapamil (CALAN-SR) 240 MG CR tablet Take 1 tablet (240 mg total) by mouth at bedtime. 90 tablet 0     zolpidem (AMBIEN) 10 mg tablet Take 1 tablet (10 mg total) by mouth at bedtime as needed for sleep. 30 tablet 3     naloxone (NARCAN) 4 mg/actuation nasal spray 1 spray (4 mg dose) into one nostril for opioid reversal. Call 911. May repeat if no response in 3 minutes. 1 Box 0     Current Facility-Administered Medications   Medication Dose Route  Frequency Provider Last Rate Last Dose     denosumab 60 mg (PROLIA 60 mg/ml)  60 mg Subcutaneous Q6 Months Andrei Olivera MD   60 mg at 02/09/18 1505       History   Smoking Status     Former Smoker     Packs/day: 1.00     Years: 20.00     Quit date: 1/1/2000   Smokeless Tobacco     Never Used           OBJECTIVE:        No results found for this or any previous visit (from the past 240 hour(s)).    Vitals:    06/22/18 1000   BP: 104/54   Patient Site: Left Arm   Patient Position: Sitting   Cuff Size: Adult Regular   Pulse: 76   SpO2: 97%     Weight: 157 lb (71.2 kg)        Physical Exam:  GENERAL APPEARANCE: A&A, NAD, well hydrated, well nourished  SKIN:  Normal skin turgor, no lesions/rashes   HEENT: moist mucous membranes, no rhinorrhea  NECK: Normal  CV: RRR, no M/G/R   LUNGS: CTAB  EXTREMITY: no edema   NEURO: no gross deficits, follows conversation appropriately today  Psych: Her affect is stable, she is well-dressed and groomed, her thought process and speech pattern are normal

## 2021-06-18 NOTE — PATIENT INSTRUCTIONS - HE
"Patient Instructions by Gerardo Borges MD at 7/22/2020 10:40 AM     Author: Gerardo Borges MD Service: -- Author Type: Physician    Filed: 7/22/2020  1:03 PM Encounter Date: 7/22/2020 Status: Addendum    : Gerardo Borges MD (Physician)    Related Notes: Original Note by Gerardo Borges MD (Physician) filed at 7/22/2020  1:01 PM       -Your EKG is normal.  -Your orthostatic blood pressure readings are normal.  -Your blood cell counts are normal.  -Your urinalysis shows no signs of bladder infection.  -Your electrolyte / kidney function panel is in process.  -Have referred you for Holter Monitor placement. You should receive a call from Cardiology within 1-2 business days. Please call [facility] at [phone] to check the status of this referral if you have not received any information regarding this appointment within 3 business days.  -Push fluids and get plenty of rest.  -Limit your caffeine consumption.  Patient Education     Heart Palpitations    Palpitations are the feeling that your heart is beating hard, fast, or irregular. Some describe it as \"pounding\" or \"skipped beats.\" Palpitations may occur in someone with heart disease, but can also occur in a healthy person.  Heart-related causes:    Arrhythmia (a change from the heart's normal rhythm)    Heart valve disease    Disease of the heart muscle    Coronary artery disease    High blood pressure  Non-heart-related causes:    Certain medicines such as asthma inhalers and decongestants    Some herbal supplements, energy drinks and pills, and weight loss pills    Illegal stimulant drugs such as cocaine, crank, methamphetamine, PCP, bath salts, or ecstasy    Caffeine, alcohol, and tobacco    Medical conditions such as thyroid disease, anemia, anxiety, and panic disorder  Sometimes the cause can't be found.  Home care  Follow these home care tips:    Don't use too much caffeine, alcohol, tobacco, or any stimulant drugs.    Tell your " doctor about any prescription or over-the-counter or herbal medicines you take.  Follow-up care    Follow up with your doctor, or as advised.  Call 911  This is the fastest and safest way to get to the emergency department. The paramedics can also begin treatment on the way to the hospital, if needed.  Don't wait until your symptoms are severe to call 911. These are reasons to call 911:    Chest pain    Shortness of breath    Feeling lightheaded, faint, or dizzy    Fainting or loss of consciousness    Very irregular heartbeat    Rapid heartbeat that makes you uncomfortable    Slower than usual heart rate associated with symptoms    Slower than usual heart rate    Chest pain with weakness, dizziness, heavy sweating, nausea, or vomiting    Extreme drowsiness or confusion    Weakness of an arm or leg, or on 1 side of the face    Difficulty with speech or vision  When to seek medical advice  Call your healthcare provider right away if you have palpitations and any of the following:    Weakness    Dizziness    Lightheadedness    Fainting  Date Last Reviewed: 4/27/2016 2000-2017 [employer]. [address] [place] [id number]. All rights reserved. This information is not intended as a substitute for professional medical care. Always follow your healthcare professional's instructions.
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
## 2021-06-18 NOTE — PROGRESS NOTES
ASSESSMENT/PLAN:       1. Mood disturbance  -Significantly improved with the addition of the Seroquel.  Her question is if she has bipolar disorder or not.  Certainly she does have a family history of this.  We discussed that it certainly possibility but this could have also been an acute stress reaction.  She will discuss this with Dr. Lynn as well.  -I recommended considering discontinuing the evening dose, and doing 1 pill in the morning and 2 at bedtime of the Seroquel to see if this helps with her over tiredness now.    2. Acute meniscal tear of right knee  -Will refer to orthopedics for further evaluation and assessment.  Unfortunately she is a difficult surgical candidate due to her chronic opioid use as well as her history of difficult surgeries however if we can manage her pain and improve the pain related to meniscal tear she likely would not need as many opioid medications.  - Ambulatory referral to Orthopedics    3. Essential hypertension  -Blood pressure is under good control today.  I congratulated her on this.  Continue on current medications and follow-up in 4-6 months.        Caitlyn Rees MD      PROGRESS NOTE   5/25/2018    SUBJECTIVE:  Sandy Spencer is a 77 y.o. female  who presents for follow up.     Patient had an episode where she had lost some time, and had apparently slept for several days in a row.  She has a long-standing history of inability and difficulty sleeping.  Prior to this episode she had found out that her son had quit his job unexpectedly moved to [place], and she had also had a interaction with her granddaughter who is accusing her being a poor grandmother.  She feels that this reaction she had a secondary to the stress that she had going on in her life.  She then followed up with her pain clinic physician who is also psychiatrist who recommended increasing the Seroquel I put her on.  She is now taking 4 per day, 1 in the morning, 1 in the evening and 2 at  bedtime.  She feels like this may be just a little bit more than she can tolerate as she has now been sleeping for 9-10 hours a night.  She is wondering if she could decrease the thumb.    Additionally she has some long-standing pain in her right knee and a lot of pressure posterior to the right knee.  I did order an MRI which showed an meniscal tear.  She previously had been told by several surgeons that she would not be a good candidate for knee replacement and she does have fairly severe arthritis but the question is is with the rapid change in her pain as a secondary to meniscal tear.  She is wondering about seeing an orthopedic doctor to talk about the meniscal tear.    Her blood pressure has improved quite a bit with the spironolactone, she is no longer taking the losartan.  She is pleased with this.  Unfortunately she is unable to exercise secondary to the knee pain.    She does still have the pain on the top of her skull at times. This has been longstanding.She does think that the Seroquel has been helping though with this.   Chief Complaint   Patient presents with     Follow-up     Patient is here today to follow up on her current medications.      Muscle Pain     Patient has been having muscle pain in her pelvic area and into her upper left leg.          Patient Active Problem List   Diagnosis     Chronic kidney disease (CKD) stage G3a/A1, moderately decreased glomerular filtration rate (GFR) between 45-59 mL/min/1.73 square meter and albuminuria creatinine ratio less than 30 mg/g     Focal glomerular sclerosis     Anxiety and depression     RSD lower limb, bilateral     ADD (attention deficit disorder)     RSD upper limb, right     Other specified hypothyroidism     Anxiety     Osteopenia     Major depression     Hypertension     Insomnia     Mixed hyperlipidemia     Right rotator cuff tear     Cluster headaches     Lumbar radiculopathy     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     Reflex  sympathetic dystrophy     Acute encephalopathy     Temporomandibular joint-pain-dysfunction syndrome (TMJ)     Pancreatitis     Localized swelling of lower leg     Medical cannabis use     Acute right-sided low back pain without sciatica     Acute exacerbation of chronic low back pain     Acute pancreatitis     Accelerated hypertension     Acquired hypothyroidism     Chronic pain syndrome     Non morbid obesity due to excess calories     Snoring     Inadequate pain control     Diarrhea     Dehydration     Abdominal pain     Dermatochalasis of left eyelid       Current Outpatient Prescriptions   Medication Sig Dispense Refill     aspirin 81 MG EC tablet Take 81 mg by mouth daily.       atorvastatin (LIPITOR) 80 MG tablet Take 1 tablet (80 mg total) by mouth at bedtime. 90 tablet 3     azelastine (ASTEPRO) 0.15 % (205.5 mcg) Spry nasal spray 1 spray by Left Nare route 2 (two) times a day as needed.       cholecalciferol, vitamin D3, 5,000 unit Tab Take 1 tablet by mouth daily.       diclofenac sodium (VOLTAREN) 1 % Gel Apply twice a day as needed 100 g 2     diphenhydrAMINE (BENADRYL) 25 mg capsule Take 25 mg by mouth every 6 (six) hours as needed.        fentaNYL (DURAGESIC) 50 mcg/hr Place 1 patch on the skin every third day. 10 patch 0     levothyroxine (SYNTHROID, LEVOTHROID) 50 MCG tablet Take 1 tablet (50 mcg total) by mouth daily. 90 tablet 1     losartan (COZAAR) 25 MG tablet Take 1 tablet (25 mg total) by mouth 2 (two) times a day. 180 tablet 1     naloxone (NARCAN) 4 mg/actuation nasal spray 1 spray (4 mg dose) into one nostril for opioid reversal. Call 911. May repeat if no response in 3 minutes. 1 Box 0     nortriptyline (PAMELOR) 50 MG capsule Take 1 capsule (50 mg total) by mouth at bedtime. 30 capsule 2     OMEGA-3/DHA/EPA/FISH OIL (FISH OIL-OMEGA-3 FATTY ACIDS) 300-1,000 mg capsule Take 1.2 g by mouth 2 (two) times a day.       psyllium (METAMUCIL) 3.4 gram packet Take 1 packet by mouth 2 (two) times a  day.  0     QUEtiapine (SEROQUEL) 25 MG tablet Take 1 tab am, three tabs bedtime 120 tablet 1     spironolactone (ALDACTONE) 25 MG tablet Take 1 tablet (25 mg total) by mouth daily. 90 tablet 1     SUMAtriptan (IMITREX) 50 MG tablet Take 1 tablet (50 mg total) by mouth every 2 (two) hours as needed for migraine. 27 tablet 1     traZODone (DESYREL) 100 MG tablet TAKE 2 TO 4 TABLETS(200  MG) BY MOUTH AT BEDTIME 360 tablet 0     verapamil (CALAN-SR) 240 MG CR tablet Take 1 tablet (240 mg total) by mouth at bedtime. 90 tablet 1     zolpidem (AMBIEN) 10 mg tablet Take 1 tablet (10 mg total) by mouth at bedtime as needed for sleep. 30 tablet 3     Current Facility-Administered Medications   Medication Dose Route Frequency Provider Last Rate Last Dose     denosumab 60 mg (PROLIA 60 mg/ml)  60 mg Subcutaneous Q6 Months Andrei Olivera MD   60 mg at 02/09/18 1505       History   Smoking Status     Former Smoker     Packs/day: 1.00     Years: 20.00     Quit date: 1/1/2000   Smokeless Tobacco     Never Used           OBJECTIVE:        No results found for this or any previous visit (from the past 240 hour(s)).    Vitals:    05/25/18 1105   BP: 110/62   Patient Site: Left Arm   Patient Position: Sitting   Cuff Size: Adult Regular   Pulse: 82   SpO2: 96%   Weight: 157 lb 9.6 oz (71.5 kg)     Weight: 157 lb (71.2 kg)        Physical Exam:  GENERAL APPEARANCE: A&A, NAD, well hydrated, well nourished  SKIN:  Normal skin turgor, no lesions/rashes   HEENT: moist mucous membranes, no rhinorrhea  NECK: Normal  CV: RRR, no M/G/R   LUNGS: CTAB  EXTREMITY: no edema   NEURO: no gross deficits   Psych: Her affect is appropriate, she is well-dressed and groomed, her eye contact is normal, her speech pattern is normal now

## 2021-06-19 NOTE — PROGRESS NOTES
[facility] Daily Progress     Patient Name: Sandy Spencer  Date: 2018  Visit #: 4  PTA visit #:     Referral Diagnosis: Chronic pain  Referring provider: Michael Ramirez DO  Visit Diagnosis:     ICD-10-CM    1. Diffuse myofascial pain syndrome M79.1    2. Lumbar radiculopathy M54.16    3. Thoracic spine pain M54.6    4. Cervical radiculitis M54.12    5. Chronic pain disorder G89.4          Assessment:     Patient is benefitting from skilled physical therapy and is making steady progress toward functional goals.  Patient is appropriate to continue with skilled physical therapy intervention, as indicated by initial plan of care.  She felt much better post treatment with decreased pain in her hip. There was excellent motility of her abdomen during her treatment as well.      Goal Status:  Pt. will demonstrate/verbalize independence in self-management of condition in : 12 weeks  Pt. will report decreased intensity, frequency of : Pain;in 12 weeks;Comment  Comment:: decrease pain to 2-7/10 with ADLs  Pt. will have improved quality of sleep: waking less times/night;getting 75-90% of required amount;in 12 weeks;Comment  Comment:: increase sleep to 4-6 hours  Pt. will be able to walk : 30 minutes;on even surfaces;with less difficulty;for household mobility;for community mobility;for exercise/recreation;in 12 weeks  Patient will stand : 30 minutes;with less difficultty;for home chores;in 12 weeks  Patient will sit: 30 minutes;for driving;for eating;for watching TV;with less difficultty;in 12 weeks  Patient will reach / maintain arm movement: overhead;for home chores;for dressing;with less difficulty;in 12 weeks  Patient will decrease : TIMA score;by _ points;for improved quality of function;for improved quality of life;in 12 weeks  by ___ points: 15    Plan / Patient Education:     Continue with initial plan of care.    Subjective:     Pain Ratin    She went to the ED last Wednesday with pancreatitis.  She needed 3 IV fluids  She has not been eating much lately which has helped with some of her pain. There is still having diarrhea along with lightheadedness and dizziness.     Objective:     Neural, visc fascial tensions.     Treatment Today   7/30/2018   TREATMENT MINUTES COMMENTS   Evaluation     Self-care/ Home management     Manual therapy 25 Fascial release using Strain-Counterstrain of LLE/hip-N, vagus-N B, L lower abd-V   Neuromuscular Re-education 15 Recip inhib of visc, neural fascia   Therapeutic Activity     Therapeutic Exercises 15 AA/PROM to LLE, L-T-C spine, ribs   Gait training     Modality__________________                Total 55    Blank areas are intentional and mean the treatment did not include these items.       Rudolph Molina  7/30/2018

## 2021-06-19 NOTE — LETTER
Letter by Caitlyn Rees MD at      Author: Caitlyn Rees MD Service: -- Author Type: --    Filed:  Encounter Date: 6/28/2019 Status: (Other)         Sandy Spencer  [address]  [address]  [place] [address]             June 28, 2019         Dear Ms. Spencer,    Below are the results from your recent visit:    Resulted Orders   Basic Metabolic Panel   Result Value Ref Range    Sodium 136 136 - 145 mmol/L    Potassium 3.9 3.5 - 5.0 mmol/L    Chloride 107 98 - 107 mmol/L    CO2 20 (L) 22 - 31 mmol/L    Anion Gap, Calculation 9 5 - 18 mmol/L    Glucose 85 70 - 125 mg/dL    Calcium 8.7 8.5 - 10.5 mg/dL    BUN 22 8 - 28 mg/dL    Creatinine 1.15 (H) 0.60 - 1.10 mg/dL    GFR MDRD Af Amer 56 (L) >60 mL/min/1.73m2    GFR MDRD Non Af Amer 46 (L) >60 mL/min/1.73m2    Narrative    Fasting Glucose reference range is 70-99 mg/dL per  American Diabetes Association (ADA) guidelines.       Your kidneys are much better, this is great news. Keep up the good work with hydration!    Please call with questions or contact us using [employer].    Sincerely,        Electronically signed by Caitlyn Rees MD

## 2021-06-19 NOTE — PROGRESS NOTES
[facility] Daily Progress     Patient Name: Sandy Spencer  Date: 7/23/2018  Visit #: 3  PTA visit #:     Referral Diagnosis: Chronic pain  Referring provider: Michael Ramirez DO  Visit Diagnosis:     ICD-10-CM    1. Diffuse myofascial pain syndrome M79.1    2. Lumbar radiculopathy M54.16    3. Thoracic spine pain M54.6    4. Cervical radiculitis M54.12    5. Chronic pain disorder G89.4          Assessment:     Patient is benefitting from skilled physical therapy and is making steady progress toward functional goals.  Patient is appropriate to continue with skilled physical therapy intervention, as indicated by initial plan of care.  There was good release of fascial tensions treated today. She does have an increased amount of pressure in her head and treatment today should help to decrease that and assist in central sensitization.     Goal Status:  Pt. will demonstrate/verbalize independence in self-management of condition in : 12 weeks  Pt. will report decreased intensity, frequency of : Pain;in 12 weeks;Comment  Comment:: decrease pain to 2-7/10 with ADLs  Pt. will have improved quality of sleep: waking less times/night;getting 75-90% of required amount;in 12 weeks;Comment  Comment:: increase sleep to 4-6 hours  Pt. will be able to walk : 30 minutes;on even surfaces;with less difficulty;for household mobility;for community mobility;for exercise/recreation;in 12 weeks  Patient will stand : 30 minutes;with less difficultty;for home chores;in 12 weeks  Patient will sit: 30 minutes;for driving;for eating;for watching TV;with less difficultty;in 12 weeks  Patient will reach / maintain arm movement: overhead;for home chores;for dressing;with less difficulty;in 12 weeks  Patient will decrease : TIMA score;by _ points;for improved quality of function;for improved quality of life;in 12 weeks  by ___ points: 15    Plan / Patient Education:     Continue with initial plan of care.    Subjective:     Pain  Ratin    Her legs have been ice cold. She has also had some severe issues with her colon as well. She isn't able to eat and lost 7 lbs from this. She did just get off of Doxycycline in the last week as well.     Objective:     LV, art, MS fascial tensions.     Treatment Today   2018   TREATMENT MINUTES COMMENTS   Evaluation     Self-care/ Home management     Manual therapy 25 Fascial release using Strain-Counterstrain of B C1-2 F/E(P)-MS, B cervical ant/post EPI-LV, BLE/abdominal-Art   Neuromuscular Re-education 15 Recip inhib of art, MS, LV fascia   Therapeutic Activity     Therapeutic Exercises 15 AA/PROM to BLE, L-T-C spine, ribs   Gait training     Modality__________________                Total 55    Blank areas are intentional and mean the treatment did not include these items.       Rudolph Molina  2018

## 2021-06-19 NOTE — PROGRESS NOTES
[facility] Daily Progress     Patient Name: Sandy Spencer  Date: 8/15/2018  Visit #: 3/12    Referral Diagnosis: LBP with R sided SI pain  Referring provider: Michael Ramirez DO  Visit Diagnosis:     ICD-10-CM    1. Acute right-sided low back pain without sciatica M54.5    2. Chronic pain syndrome G89.4    3. Left-sided low back pain without sciatica, unspecified chronicity M54.5          Assessment:     Patient is benefitting from skilled physical therapy and is making steady progress toward functional goals.  Patient is appropriate to continue with skilled physical therapy intervention, as indicated by initial plan of care.    Goal Status:  Pt. will demonstrate/verbalize independence in self-management of condition in : 12 weeks  Pt. will report decreased intensity, frequency of : Pain;in 12 weeks;Comment  Comment:: decrease pain to 2-7/10 with ADLs  Pt. will have improved quality of sleep: waking less times/night;getting 75-90% of required amount;in 12 weeks;Comment  Comment:: increase sleep to 4-6 hours  Pt. will be able to walk : 30 minutes;on even surfaces;with less difficulty;for household mobility;for community mobility;for exercise/recreation;in 12 weeks  Patient will stand : 30 minutes;with less difficultty;for home chores;in 12 weeks  Patient will sit: 30 minutes;for driving;for eating;for watching TV;with less difficultty;in 12 weeks  Patient will reach / maintain arm movement: overhead;for home chores;for dressing;with less difficulty;in 12 weeks  Patient will decrease : TIMA score;by _ points;for improved quality of function;for improved quality of life;in 12 weeks  by ___ points: 15    Plan / Patient Education:     Continue with initial plan of care.    Subjective:     Pain Ratin  Lside SI pain  With radiation into L Ant Quad      Objective:     L SI upslip   + Scan to Visceral  Upper Abdominal,       Treatment Today       TREATMENT MINUTES COMMENTS   Evaluation     Self-care/ Home  management     Manual therapy     Neuromuscular Re-education 60 MFR with OA, L5-SI and SI joint releases, Dural Tube Pull.   SCS to PS2,3,4-N L   PGT10-N L,   SCS to R Ureter,   L Bladder,     Releases achieved.   Pain to the L ant, post lower thoracic abdominal areas of the trunk reduced 9/10 to 2/10.   SI dysf resolved.     Therapeutic Activity     Therapeutic Exercises     Gait training     Modality__________________                Total 60    Blank areas are intentional and mean the treatment did not include these items.       Yogi Velazquez  8/15/2018

## 2021-06-19 NOTE — PROGRESS NOTES
Coronary CTA with Calcium Score:  Table malfunction; table stopped during test.  Dr. Ybarra notified and consulted re: if table check okayed by [facility] team, to proceed with 2nd SL NTG and 2nd dose of IV contrast.  If table functional, received ok to proceed with pt to have creatinine with primary MD IN 48 hours (would be Saturday; will make arrangements for creatinine check for Friday if able to proceed).  All explained to pt.  [facility] expected here in 30 minutes.  Pt agreeable to wait.

## 2021-06-19 NOTE — PROGRESS NOTES
ASSESSMENT/PLAN:       1. Diarrhea  -There are multiple possible etiologies for her symptoms including magnesium overdose, overflow diarrhea from constipation, hyperthyroidism, irritable bowel syndrome, or things that are more sinister.  Given her recent antibiotic treatment I am checking stool studies to look for signs of obvious infection, additionally I am checking basic labs as listed below.  Discussed good hydration, and light diet and if things are not improving she should let me know and we can consider referral back to gastroenterology.  - Comprehensive Metabolic Panel  - Magnesium  - HM1(CBC and Differential)  - C. difficile Toxigenic by PCR; Future  - Ova and Parasite, Stool; Future  - Culture, Stool; Future  - HM1 (CBC with Diff)    2. RSD lower limb, bilateral  -Patient is a long-standing history of a struggle with RSD particularly in her lower legs.  She seems to be having a significant flare now recently over the last several weeks and has been feeling more dismissed by her pain clinic.  She is going to present back to them for further evaluation and assessment discuss whether they feel she should get a second opinion or not.  In the meantime she will continue to work with physical therapy, I am checking the labs as listed above and below and I am starting her back on her amlodipine as a replacement for verapamil as this was the only thing that had changed prior to her having her flare.  Given the nature of calcium channel blockers I wonder if this is some of what happened and caused her flare.  - Ambulatory referral to Pain Clinic    3. Chronic pain syndrome  - Ambulatory referral to Pain Clinic    4. Acquired hypothyroidism  -Given her diarrhea, I am updating her thyroid labs today.  - Thyroid Stimulating Hormone (TSH)  - T4, Free    5. Cluster headaches  -She feels that her headaches are significantly improved with the addition of the Topamax, continue on this medication and follow-up in 3-6  months.  - topiramate (TOPAMAX) 25 MG tablet; Take 1 tablet (25 mg total) by mouth 2 (two) times a day.  Dispense: 180 tablet; Refill: 1    6. Acute pancreatitis, unspecified complication status, unspecified pancreatitis type  -She has worsening left flank pain which concerns her for possible recurrent pancreatitis, updating labs today.  - Lipase    30 minutes was spent face-to-face with the patient during this encounter and >50% of this was spent on counseling and coordination of care. We discussed in depth the topics listed above.       Caitlyn Rees MD      PROGRESS NOTE   7/23/2018    SUBJECTIVE:  Sandy Spencer is a 77 y.o. female  who presents for several issues.     Patient notes that she has had significant diarrhea over the last several days.  This started after she finished her antibiotic for her sinusitis.  She has lost 7 pounds in the last five days. She hasn't eaten a meal in a week, ice cream was the only thing staying down. She is hungry. Nothing is appealing. She has been drinking water trying to stay hydrated. She is having up to 10 diarrhea episodes a day. She has had half an english muffin today. She has taken nothing today for the diarrhea. She is nauseated, does feel hungry.  She notes that she has increased her magnesium over the last week as well and her vitamin C.  She did this to help with her RSD but it does not seem to have helped.  She does have an appointment with gastroenterology in a few days.  Additionally she has had some pain in her left flank which makes her concerned that she may be has recurrent pancreatitis.    She continues to struggle with her RSD quite a bit and her main symptom right now is a feeling of her legs being very cold.  She is currently working with physical therapy as well as the pain clinic.  Her experience of the pain clinic is been a little bit more frustrating lately and she feels like she is maybe been a bit more dismissed.  She states she does  not want an increase in her fentanyl but rather other advice to help control her pain.  What I think is making this most difficult is that the recommended alternate therapies are quite expensive and she is failed several of them and is concerned about spending more money on medication or vitamins that may not help.      Last week I had her restart a very low-dose of amlodipine to replace the verapamil that was discontinued due to bradycardia.  The pills are difficult to split.  She has noticed no other ill effects however.  Additionally she had Topamax added to help with her headaches and she has not had a headache since this was started.  She is taking it 2 times a day without any obvious side effects.      Chief Complaint   Patient presents with     Follow-up     Patient is here today for a follow up on her current medications and the chill/cold pains in her legs.      Diarrhea     Patient has been having diarrhea for one week, she has woken up at 4 AM this morning to having diarrhea and urinating. Chay is to see her colon doctor on Wednesday of this week, she is hoping we can run lab work today so she can review it with her colon doctor.          Patient Active Problem List   Diagnosis     Chronic kidney disease (CKD) stage G3a/A1, moderately decreased glomerular filtration rate (GFR) between 45-59 mL/min/1.73 square meter and albuminuria creatinine ratio less than 30 mg/g     Focal glomerular sclerosis     Anxiety and depression     RSD lower limb, bilateral     ADD (attention deficit disorder)     RSD upper limb, right     Other specified hypothyroidism     Anxiety     Osteopenia     Major depression     Hypertension     Insomnia     Mixed hyperlipidemia     Right rotator cuff tear     Cluster headaches     Lumbar radiculopathy     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     Reflex sympathetic dystrophy     Acute encephalopathy     Temporomandibular joint-pain-dysfunction syndrome (TMJ)      Pancreatitis     Localized swelling of lower leg     Medical cannabis use     Acute right-sided low back pain without sciatica     Acute exacerbation of chronic low back pain     Acquired hypothyroidism     Chronic pain syndrome     Non morbid obesity due to excess calories     Snoring     Inadequate pain control     Diarrhea     Abdominal pain     Dermatochalasis of left eyelid       Current Outpatient Prescriptions   Medication Sig Dispense Refill     acetylcysteine, bulk, Powd 600 mg capsules, take orally three times a day 42 Bottle 2     amLODIPine (NORVASC) 2.5 MG tablet Take 0.5 tablets (1.25 mg total) by mouth daily. 15 tablet 3     aspirin 81 MG EC tablet Take 81 mg by mouth daily.       atorvastatin (LIPITOR) 80 MG tablet Take 1 tablet (80 mg total) by mouth at bedtime. 90 tablet 3     azelastine (ASTEPRO) 0.15 % (205.5 mcg) Spry nasal spray 1 spray by Left Nare route 2 (two) times a day as needed.       cholecalciferol, vitamin D3, 5,000 unit Tab Take 1 tablet by mouth daily.       diphenhydrAMINE (BENADRYL) 25 mg capsule Take 25 mg by mouth every 6 (six) hours as needed.        eszopiclone (LUNESTA) 3 mg tablet TK 1 T PO IMMEDIATELY BEFORE BEDTIME QPM  2     fentaNYL (DURAGESIC) 50 mcg/hr Place 1 patch on the skin every third day. 10 patch 0     levothyroxine (SYNTHROID, LEVOTHROID) 50 MCG tablet Take 1 tablet (50 mcg total) by mouth daily. 90 tablet 0     naloxone (NARCAN) 4 mg/actuation nasal spray 1 spray (4 mg dose) into one nostril for opioid reversal. Call 911. May repeat if no response in 3 minutes. 1 Box 0     OMEGA-3/DHA/EPA/FISH OIL (FISH OIL-OMEGA-3 FATTY ACIDS) 300-1,000 mg capsule Take 1.2 g by mouth 2 (two) times a day.       progesterone (PROMETRIUM) 100 MG capsule Take 1 capsule (100 mg total) by mouth daily. 30 capsule 1     psyllium (METAMUCIL) 3.4 gram packet Take 1 packet by mouth 2 (two) times a day.  0     spironolactone (ALDACTONE) 25 MG tablet Take 1 tablet (25 mg total) by mouth  daily. 90 tablet 1     SUMAtriptan (IMITREX) 50 MG tablet Take 1 tablet (50 mg total) by mouth every 2 (two) hours as needed for migraine. 27 tablet 1     topiramate (TOPAMAX) 25 MG tablet Take 1 tablet (25 mg total) by mouth 2 (two) times a day. 180 tablet 1     traZODone (DESYREL) 100 MG tablet TAKE 2 TO 4 TABLETS(200  MG) BY MOUTH AT BEDTIME 360 tablet 0     zolpidem (AMBIEN) 10 mg tablet Take 1 tablet (10 mg total) by mouth at bedtime as needed for sleep. 30 tablet 3     Current Facility-Administered Medications   Medication Dose Route Frequency Provider Last Rate Last Dose     denosumab 60 mg (PROLIA 60 mg/ml)  60 mg Subcutaneous Q6 Months Andrei Olivera MD   60 mg at 02/09/18 1505       History   Smoking Status     Former Smoker     Packs/day: 1.00     Years: 20.00     Quit date: 1/1/2000   Smokeless Tobacco     Never Used           OBJECTIVE:        Recent Results (from the past 240 hour(s))   HM1 (CBC with Diff)   Result Value Ref Range    WBC 9.1 4.0 - 11.0 thou/uL    RBC 4.94 3.80 - 5.40 mill/uL    Hemoglobin 15.6 12.0 - 16.0 g/dL    Hematocrit 46.9 35.0 - 47.0 %    MCV 95 80 - 100 fL    MCH 31.6 27.0 - 34.0 pg    MCHC 33.2 32.0 - 36.0 g/dL    RDW 13.3 11.0 - 14.5 %    Platelets 237 140 - 440 thou/uL    MPV 7.2 7.0 - 10.0 fL    Neutrophils % 65 50 - 70 %    Lymphocytes % 25 20 - 40 %    Monocytes % 8 2 - 10 %    Eosinophils % 1 0 - 6 %    Basophils % 1 0 - 2 %    Neutrophils Absolute 5.9 2.0 - 7.7 thou/uL    Lymphocytes Absolute 2.3 0.8 - 4.4 thou/uL    Monocytes Absolute 0.8 0.0 - 0.9 thou/uL    Eosinophils Absolute 0.1 0.0 - 0.4 thou/uL    Basophils Absolute 0.1 0.0 - 0.2 thou/uL       Vitals:    07/23/18 1034   BP: 120/74   Patient Site: Left Arm   Patient Position: Sitting   Cuff Size: Adult Regular   Pulse: 86   SpO2: 97%   Weight: 150 lb 8 oz (68.3 kg)     Weight: 150 lb 8 oz (68.3 kg)        Physical Exam:  GENERAL APPEARANCE: A&A, NAD, well hydrated, well nourished  SKIN:  Normal skin  turgor, no lesions/rashes   HEENT: moist mucous membranes, no rhinorrhea  NECK: Normal  CV: RRR, no M/G/R   LUNGS: CTAB  ABDOMEN: S&NT, no masses, normal bowel sounds  EXTREMITY: no edema, legs have good pulses and are warm to the touch  NEURO: no gross deficits
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
## 2021-06-19 NOTE — PROGRESS NOTES
ASSESSMENT/PLAN:       1. Bradycardia  -Her bradycardia has improved some with the discontinuation of the verapamil but she does continue to be fairly bradycardic.  Given her other medical issues I am referring her to cardiology for further evaluation assessment and guidance.  - Ambulatory referral to Cardiology    2. Hip pain, left  -The patient did have an x-ray done in January which was just about 6 months ago which showed some mild arthritis.  I am referring her to physical therapy as well as spine clinic.  The discussion was possibly having an injection in that hip which I will defer to the spine clinic for further guidance.  She certainly does have signs of arthritis on exam however.  - Ambulatory referral to PT/OT  - Ambulatory referral to Spine Care    3. Arm paresthesia, right  -She did have an x-ray of her cervical spines showing significant degeneration in the last few years, no new injuries.  Referral to spine clinic and physical therapy placed today.  - Ambulatory referral to PT/OT  - Ambulatory referral to Spine Care    4. Left-sided thoracic back pain  -I suspect that this is myofascial in nature, referring to spine clinic for further evaluation and assessment.  MRI of the lumbar spine showing the lower thoracic spine done in April without any significant etiologies.  - Ambulatory referral to PT/OT  - Ambulatory referral to Spine Care    5. Cluster headaches  -Increasing headaches with the discontinuation of verapamil.  Given her allergies we are limited with what we can use, I am going to have her start a low-dose of Topamax to see if this helps and she will follow-up here in approximately 1 month for recheck.  - topiramate (TOPAMAX) 25 MG tablet; Take 25 mg orally each night at bedtime for one week, then increase to 25 mg orally two times a day  Dispense: 60 tablet; Refill: 2    6. Acute sinusitis with symptoms > 10 days  -Given the duration of her symptoms I am going to treat her with doxycycline  for possible sinusitis.  Follow-up if not improving.  - doxycycline (VIBRA-TABS) 100 MG tablet; Take 1 tablet (100 mg total) by mouth 2 (two) times a day for 7 days.  Dispense: 14 tablet; Refill: 0    40 minutes was spent face-to-face with the patient during this encounter and >50% of this was spent on counseling and coordination of care. We discussed in depth the topics listed above.       Caitlyn Rees MD      PROGRESS NOTE   7/9/2018    SUBJECTIVE:  Sandy Spencer is a 77 y.o. female  who presents for follow up.     She is here today for follow up. She did have her verapamil discontinued from the pain clinic. She has had increasing headaches with the discontinuation of the verapamil. She is still having a low resting pulse at this time on her fit bit.  She does feel short of breath at times. She did feel this at times with the Seroquel that she was on. She has been on Topamax in the past, but she cannot remember what the reaction was for this.     She does have increasing left hip pain into the groin. The pain MD was wondering about PT. She does have left back pain as well. The pain is gripping and severe in nature. It is there all the time. She has been on gabapentin for 22 years. She was on 3600 mg per day. She does wonder about injections in her hip as well. She does feel like the hip locks again as well.  She denies any new injuries, she did have an x-ray done in January.    She does wonder about a sinus infection as well on the left side of her face. This has been for a few weeks. She wasn't using the inhaler until stopping the Verapamil. She has had the headaches on the left, so started it again.  She does have point tenderness, denies any significant congestion however.    She does have tingling over the back of her right shoulder and upper back. This has been the last month. She feels this all the way down to her elbow. She is not sure if this is from the discs in her neck again. She has the  radiofrequency ablation 1.5 years ago. Does worry about the work up to get this again.   Chief Complaint   Patient presents with     Follow-up     Patient would like to follow up on her recent holtor monitor results. She would also like to follow up from her visit with Dr. Lynn 7/6/18, she stopped the Verapamil and since it has caused her headaches and low back pain into the left hip pain to flare up.          Patient Active Problem List   Diagnosis     Chronic kidney disease (CKD) stage G3a/A1, moderately decreased glomerular filtration rate (GFR) between 45-59 mL/min/1.73 square meter and albuminuria creatinine ratio less than 30 mg/g     Focal glomerular sclerosis     Anxiety and depression     RSD lower limb, bilateral     ADD (attention deficit disorder)     RSD upper limb, right     Other specified hypothyroidism     Anxiety     Osteopenia     Major depression     Hypertension     Insomnia     Mixed hyperlipidemia     Right rotator cuff tear     Cluster headaches     Lumbar radiculopathy     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     Reflex sympathetic dystrophy     Acute encephalopathy     Temporomandibular joint-pain-dysfunction syndrome (TMJ)     Pancreatitis     Localized swelling of lower leg     Medical cannabis use     Acute right-sided low back pain without sciatica     Acute exacerbation of chronic low back pain     Acquired hypothyroidism     Chronic pain syndrome     Non morbid obesity due to excess calories     Snoring     Inadequate pain control     Diarrhea     Abdominal pain     Dermatochalasis of left eyelid       Current Outpatient Prescriptions   Medication Sig Dispense Refill     acetylcysteine, bulk, Powd 600 mg capsules, take orally three times a day 42 Bottle 0     aspirin 81 MG EC tablet Take 81 mg by mouth daily.       atorvastatin (LIPITOR) 80 MG tablet Take 1 tablet (80 mg total) by mouth at bedtime. 90 tablet 3     azelastine (ASTEPRO) 0.15 % (205.5 mcg) Spry nasal spray  1 spray by Left Nare route 2 (two) times a day as needed.       cholecalciferol, vitamin D3, 5,000 unit Tab Take 1 tablet by mouth daily.       diphenhydrAMINE (BENADRYL) 25 mg capsule Take 25 mg by mouth every 6 (six) hours as needed.        [START ON 7/21/2018] fentaNYL (DURAGESIC) 50 mcg/hr Place 1 patch on the skin every third day. 10 patch 0     levothyroxine (SYNTHROID, LEVOTHROID) 50 MCG tablet Take 1 tablet (50 mcg total) by mouth daily. 90 tablet 0     naloxone (NARCAN) 4 mg/actuation nasal spray 1 spray (4 mg dose) into one nostril for opioid reversal. Call 911. May repeat if no response in 3 minutes. 1 Box 0     OMEGA-3/DHA/EPA/FISH OIL (FISH OIL-OMEGA-3 FATTY ACIDS) 300-1,000 mg capsule Take 1.2 g by mouth 2 (two) times a day.       progesterone (PROMETRIUM) 100 MG capsule Take 1 capsule (100 mg total) by mouth daily. 30 capsule 1     psyllium (METAMUCIL) 3.4 gram packet Take 1 packet by mouth 2 (two) times a day.  0     spironolactone (ALDACTONE) 25 MG tablet Take 1 tablet (25 mg total) by mouth daily. 90 tablet 1     SUMAtriptan (IMITREX) 50 MG tablet Take 1 tablet (50 mg total) by mouth every 2 (two) hours as needed for migraine. 27 tablet 1     traZODone (DESYREL) 100 MG tablet TAKE 2 TO 4 TABLETS(200  MG) BY MOUTH AT BEDTIME 360 tablet 0     zolpidem (AMBIEN) 10 mg tablet Take 1 tablet (10 mg total) by mouth at bedtime as needed for sleep. 30 tablet 3     doxycycline (VIBRA-TABS) 100 MG tablet Take 1 tablet (100 mg total) by mouth 2 (two) times a day for 7 days. 14 tablet 0     topiramate (TOPAMAX) 25 MG tablet Take 25 mg orally each night at bedtime for one week, then increase to 25 mg orally two times a day 60 tablet 2     Current Facility-Administered Medications   Medication Dose Route Frequency Provider Last Rate Last Dose     denosumab 60 mg (PROLIA 60 mg/ml)  60 mg Subcutaneous Q6 Months Andrei Olivera MD   60 mg at 02/09/18 1504       History   Smoking Status     Former Smoker      Packs/day: 1.00     Years: 20.00     Quit date: 1/1/2000   Smokeless Tobacco     Never Used           OBJECTIVE:        No results found for this or any previous visit (from the past 240 hour(s)).    Vitals:    07/09/18 0824   BP: 118/74   Patient Site: Left Arm   Patient Position: Sitting   Cuff Size: Adult Regular   Pulse: 78   SpO2: 98%   Weight: 157 lb 6.4 oz (71.4 kg)     Weight: 157 lb 6.4 oz (71.4 kg)        Physical Exam:  GENERAL APPEARANCE: A&A, NAD, well hydrated, well nourished  SKIN:  Normal skin turgor, no lesions/rashes   HEENT: moist mucous membranes, no rhinorrhea, tympanic membranes are clear bilaterally, left maxillary sinus tenderness to palpation, pharynx is unremarkable  NECK: Normal, without lymphadenopathy  CV: RRR, no M/G/R   LUNGS: CTAB  Back: No midline tenderness to palpation, some tenderness to palpation over the muscles in the L2 region as well as the lower thoracic spine on the left, no palpable trigger points however  EXTREMITY: no edema   NEURO: no gross deficits, gait is appropriate, normal symmetric reflexes at biceps and brachioradialis tendons  Psych: The patient's affect appears more depressed than it has been previously and she is tearful today    MRI of the lumbar spine from April, hip x-ray from January and neck x-ray from last year reviewed with the patient today.

## 2021-06-19 NOTE — LETTER
Letter by Caitlyn Rees MD at      Author: Caitlyn Rees MD Service: -- Author Type: --    Filed:  Encounter Date: 5/30/2019 Status: (Other)         Sandy Spencer  [address]  [address]  [place] [address]             May 30, 2019         Dear Ms. Spencer,    Below are the results from your recent visit:    Resulted Orders   Basic Metabolic Panel   Result Value Ref Range    Sodium 138 136 - 145 mmol/L    Potassium 3.6 3.5 - 5.0 mmol/L    Chloride 107 98 - 107 mmol/L    CO2 20 (L) 22 - 31 mmol/L    Anion Gap, Calculation 11 5 - 18 mmol/L    Glucose 143 (H) 70 - 125 mg/dL    Calcium 8.8 8.5 - 10.5 mg/dL    BUN 18 8 - 28 mg/dL    Creatinine 1.27 (H) 0.60 - 1.10 mg/dL    GFR MDRD Af Amer 50 (L) >60 mL/min/1.73m2    GFR MDRD Non Af Amer 41 (L) >60 mL/min/1.73m2    Narrative    Fasting Glucose reference range is 70-99 mg/dL per  American Diabetes Association (ADA) guidelines.   Ferritin   Result Value Ref Range    Ferritin 247 (H) 10 - 130 ng/mL   Iron and Transferrin Iron Binding Capacity   Result Value Ref Range    Iron 39 (L) 42 - 175 ug/dL    Transferrin 175 (L) 212 - 360 mg/dL    Transferrin Saturation, Calculated 18 (L) 20 - 50 %    Transferrin IBC, Calculated 219 (L) 313 - 563 ug/dL   HM2(CBC w/o Differential)   Result Value Ref Range    WBC 3.3 (L) 4.0 - 11.0 thou/uL    RBC 3.63 (L) 3.80 - 5.40 mill/uL    Hemoglobin 11.4 (L) 12.0 - 16.0 g/dL    Hematocrit 35.3 35.0 - 47.0 %    MCV 97 80 - 100 fL    MCH 31.5 27.0 - 34.0 pg    MCHC 32.4 32.0 - 36.0 g/dL    RDW 14.0 11.0 - 14.5 %    Platelets 148 140 - 440 thou/uL    MPV 7.3 7.0 - 10.0 fL       Your iron is low normal but stable, please continue taking the iron supplements.     Your kidneys are stable as well which is great news.     Please call with questions or contact us using [employer].    Sincerely,        Electronically signed by Caitlyn Rees MD

## 2021-06-19 NOTE — PROGRESS NOTES
"Sandy is being monitored for irregular heart rate, sometimes in the low 40s.  She had a Holter monitor recently was told it was fairly normal.  Her blood pressure has been low at times as well and they may be decreasing the verapamil which may have been helpful for her headaches.    She is not having as much GI problems recently.    She has had some lower left groin pain.  She is wondering about her hip problems.  She has a DEXA scan in past was told her right hip is quite thin.  Last was exam was 2 years ago.    She did go to different orthopedics who offer some injections in her knees but did not not want to do surgery because of her vulnerability to reflex sympathetic dystrophy.    She will resume therapy with Brinda Burns this week.    She reports the problems with concern for a manic response and sleep are improved.  She has stopped the Seroquel.  She was aware that she \"could not shut up\".  She attributes this to stressors with her granddaughter.    We will be seeing her son soon.    She continues on the fentanyl 50 mcg feels with else doing relatively well at this point.      Current Outpatient Prescriptions:      acetylcysteine, bulk, Powd, 600 mg capsules, take orally three times a day, Disp: 42 Bottle, Rfl: 0     aspirin 81 MG EC tablet, Take 81 mg by mouth daily., Disp: , Rfl:      atorvastatin (LIPITOR) 80 MG tablet, Take 1 tablet (80 mg total) by mouth at bedtime., Disp: 90 tablet, Rfl: 3     azelastine (ASTEPRO) 0.15 % (205.5 mcg) Spry nasal spray, 1 spray by Left Nare route 2 (two) times a day as needed., Disp: , Rfl:      cholecalciferol, vitamin D3, 5,000 unit Tab, Take 1 tablet by mouth daily., Disp: , Rfl:      diphenhydrAMINE (BENADRYL) 25 mg capsule, Take 25 mg by mouth every 6 (six) hours as needed. , Disp: , Rfl:      levothyroxine (SYNTHROID, LEVOTHROID) 50 MCG tablet, Take 1 tablet (50 mcg total) by mouth daily., Disp: 90 tablet, Rfl: 0     naloxone (NARCAN) 4 mg/actuation nasal spray, 1 spray " "(4 mg dose) into one nostril for opioid reversal. Call 911. May repeat if no response in 3 minutes., Disp: 1 Box, Rfl: 0     OMEGA-3/DHA/EPA/FISH OIL (FISH OIL-OMEGA-3 FATTY ACIDS) 300-1,000 mg capsule, Take 1.2 g by mouth 2 (two) times a day., Disp: , Rfl:      psyllium (METAMUCIL) 3.4 gram packet, Take 1 packet by mouth 2 (two) times a day., Disp: , Rfl: 0     spironolactone (ALDACTONE) 25 MG tablet, Take 1 tablet (25 mg total) by mouth daily., Disp: 90 tablet, Rfl: 1     SUMAtriptan (IMITREX) 50 MG tablet, Take 1 tablet (50 mg total) by mouth every 2 (two) hours as needed for migraine., Disp: 27 tablet, Rfl: 1     traZODone (DESYREL) 100 MG tablet, TAKE 2 TO 4 TABLETS(200  MG) BY MOUTH AT BEDTIME, Disp: 360 tablet, Rfl: 0     verapamil (CALAN-SR) 240 MG CR tablet, Take 1 tablet (240 mg total) by mouth at bedtime., Disp: 90 tablet, Rfl: 0     zolpidem (AMBIEN) 10 mg tablet, Take 1 tablet (10 mg total) by mouth at bedtime as needed for sleep., Disp: 30 tablet, Rfl: 3     [START ON 7/21/2018] fentaNYL (DURAGESIC) 50 mcg/hr, Place 1 patch on the skin every third day., Disp: 10 patch, Rfl: 0     progesterone (PROMETRIUM) 100 MG capsule, Take 1 capsule (100 mg total) by mouth daily., Disp: 30 capsule, Rfl: 1    Current Facility-Administered Medications:      denosumab 60 mg (PROLIA 60 mg/ml), 60 mg, Subcutaneous, Q6 Months, Andrei Olivera MD, 60 mg at 02/09/18 1505  Blood pressure 128/67, pulse 71, resp. rate 16, height 5' 2\" (1.575 m), weight 157 lb (71.2 kg), not currently breastfeeding.    Score 8.  She is alert with a clear sensorium appropriately groomed thought process tight logical.    Impression: Chronic pain is included migraine headaches, right lower extremity chronic regional pain syndrome, history of lumbar surgeries and degenerative changes.    More recently there been significant abdominal pain after surgery which seems to resolved.    There was concern for recent manic episode, may have been " "more of a \"manic response\" to a significant stressor with her granddaughter, noting she moved back from [place] to [place] to be with family and that has not gone well.  We did do a variety of changes with Seroquel now that his stopped.    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    We discussed the off label use of low-dose pregnenolone or progesterone to help with anxiety, augment opioids for pain management, stress response.  She is interested in pursuing that.  We will start with 100 mg daily.      time spent 25 minutes face-to-face more than 50% count about above condition coordination treatment plan      "

## 2021-06-19 NOTE — LETTER
Letter by Daniela Peres RN at      Author: Daniela Peres RN Service: -- Author Type: --    Filed:  Encounter Date: 3/20/2019 Status: (Other)       Dear Sandy Spencer    Thank you for choosing [facility] for your care.  We are committed to providing you with the highest quality and compassionate healthcare services.  The following information pertains to your first appointment with our clinic.    Date/Time of appointment:  Tuesday, March 26th 2019 at 1:30 pm.      Note: Please arrive by 1:30 pm.  This allows time to complete forms, possible labs and nursing assessment.    Name of your Physician:  Sameer Floyd MD    What to bring to your appointment:    Completed Patient History/Initial Nursing Assessment and Medication/Allergy List (these forms were sent to you).    Any paperwork or films from your physician that we have asked you to bring.    Your current insurance card(s).    Parking:    Please refer to the map included to direct you to [facility].    You can park in any visitor/patient parking area you choose. There is no charge for parking at [facility].     Enter the hospital at the front/main entrance.      Please check in with our  representatives who will escort you to the clinic located in [address] of the [facility].    We hope these instructions are helpful to you.  If you have any questions or concerns, please call us at [phone].  It is our pleasure to assist you.    Warm Regards,  Daniela Peres  Nurse Navigator  [phone]

## 2021-06-19 NOTE — PROGRESS NOTES
[facility] Daily Progress     Patient Name: Sandy Spencer  Date: 2018  Visit #: 2  PTA visit #:     Referral Diagnosis: Chronic pain  Referring provider: Michael Ramirez DO  Visit Diagnosis:     ICD-10-CM    1. Diffuse myofascial pain syndrome M79.1    2. Lumbar radiculopathy M54.16    3. Thoracic spine pain M54.6    4. Cervical radiculitis M54.12    5. Chronic pain disorder G89.4          Assessment:     Patient is benefitting from skilled physical therapy and is making steady progress toward functional goals.  Patient is appropriate to continue with skilled physical therapy intervention, as indicated by initial plan of care.  She did have good release of fascial tensions treated today. This should help to calm down her ANS which is likely a strong contributor to her pain levels.     Goal Status:  Pt. will demonstrate/verbalize independence in self-management of condition in : 12 weeks  Pt. will report decreased intensity, frequency of : Pain;in 12 weeks;Comment  Comment:: decrease pain to 2-7/10 with ADLs  Pt. will have improved quality of sleep: waking less times/night;getting 75-90% of required amount;in 12 weeks;Comment  Comment:: increase sleep to 4-6 hours  Pt. will be able to walk : 30 minutes;on even surfaces;with less difficulty;for household mobility;for community mobility;for exercise/recreation;in 12 weeks  Patient will stand : 30 minutes;with less difficultty;for home chores;in 12 weeks  Patient will sit: 30 minutes;for driving;for eating;for watching TV;with less difficultty;in 12 weeks  Patient will reach / maintain arm movement: overhead;for home chores;for dressing;with less difficulty;in 12 weeks  Patient will decrease : TIMA score;by _ points;for improved quality of function;for improved quality of life;in 12 weeks  by ___ points: 15    Plan / Patient Education:     Continue with initial plan of care.    Subjective:     Pain Ratin  She hasn't slept that good and has been  up since 3 am today.   There has been ringing in her ears. She has a kink in her waist that is nauseating. There is also an ear ache going on.       Objective:     Neural fascial tensions.     Treatment Today   7/16/2018   TREATMENT MINUTES COMMENTS   Evaluation     Self-care/ Home management     Manual therapy 25 Fascial release using Strain-Counterstrain of B thor/abd pre-gang-N, B lower post-gang-N, R foot-N   Neuromuscular Re-education 15 Recip inhib of neural fascia   Therapeutic Activity     Therapeutic Exercises 15 AA/PROM to BLE, L-T spine, ribs   Gait training     Modality__________________                Total 55    Blank areas are intentional and mean the treatment did not include these items.       Rudolph Molina  7/16/2018

## 2021-06-19 NOTE — LETTER
Letter by Caitlyn Rees MD at      Author: Caitlyn Rees MD Service: -- Author Type: --    Filed:  Encounter Date: 4/11/2019 Status: (Other)         Sandy Spencer  [address]  [address]  [place] [facility] [address]             April 11, 2019         Dear Ms. Spencer,    Below are the results from your recent visit:    Resulted Orders   Comprehensive Metabolic Panel   Result Value Ref Range    Sodium 135 (L) 136 - 145 mmol/L    Potassium 3.7 3.5 - 5.0 mmol/L    Chloride 108 (H) 98 - 107 mmol/L    CO2 16 (L) 22 - 31 mmol/L    Anion Gap, Calculation 11 5 - 18 mmol/L    Glucose 186 (H) 70 - 125 mg/dL    BUN 16 8 - 28 mg/dL    Creatinine 1.53 (H) 0.60 - 1.10 mg/dL    GFR MDRD Af Amer 40 (L) >60 mL/min/1.73m2    GFR MDRD Non Af Amer 33 (L) >60 mL/min/1.73m2    Bilirubin, Total 0.4 0.0 - 1.0 mg/dL    Calcium 8.5 8.5 - 10.5 mg/dL    Protein, Total 6.3 6.0 - 8.0 g/dL    Albumin 3.4 (L) 3.5 - 5.0 g/dL    Alkaline Phosphatase 79 45 - 120 U/L    AST 18 0 - 40 U/L    ALT 10 0 - 45 U/L    Narrative    Fasting Glucose reference range is 70-99 mg/dL per  American Diabetes Association (ADA) guidelines.   Hemoglobin   Result Value Ref Range    Hemoglobin 9.6 (L) 12.0 - 16.0 g/dL   Urinalysis Macroscopic   Result Value Ref Range    Color, UA Yellow Colorless, Yellow, Straw, Light Yellow    Clarity, UA Clear Clear    Glucose, UA Negative Negative    Bilirubin, UA Small (!) Negative    Ketones, UA Negative Negative    Specific Gravity, UA >=1.030 1.005 - 1.030    Blood, UA Moderate (!) Negative    pH, UA 5.5 5.0 - 8.0    Protein,  mg/dL (!) Negative mg/dL    Urobilinogen, UA 0.2 E.U./dL 0.2 E.U./dL, 1.0 E.U./dL    Nitrite, UA Negative Negative    Leukocytes, UA Trace (!) Negative   Culture, Urine   Result Value Ref Range    Culture Mixture of urogenital organisms        As discussed, your hemoglobin was normal, your urine looked ok and your kidney was a bit more irritated. We'll recheck  this when you come back in.     Please call with questions or contact us using [employer].    Sincerely,        Electronically signed by Caitlyn Rees MD

## 2021-06-19 NOTE — LETTER
Letter by Luz Elena Ybarra MD at      Author: Luz Elena Ybarra MD Service: -- Author Type: --    Filed:  Encounter Date: 7/23/2019 Status: (Other)         Sandy Spencer  [address]  [place] [address]      July 23, 2019      Dear Sandy,    This letter is to remind you that you will be due for your follow up appointment with Dr. Luz Elena Ybarra. To help ensure you are in the best health possible, a regular follow-up with your cardiologist is essential.     Please call our Patient Scheduling Line at [phone] to schedule your appointment at your earliest convenience.  If you have recently scheduled an appointment, please disregard this letter.    We look forward to seeing you again. As always, we are available at the number  above for any questions or concerns you may have.      Sincerely,     The Physicians and Staff of [facility]

## 2021-06-19 NOTE — PROGRESS NOTES
"Mental Health Visit Note    8/13/2018   Start time: 1400    Stop Time: 1450   Session # 12    Session Type: Patient is presenting for an Individual session.    Sandy Spencer is a 77 y.o. female is being seen today for    Chief Complaint   Patient presents with     Mental Health Visit   .     New symptoms or complaints: increase in physical health issues.     Functional Impairment:   Personal: 4  Family: 1  Work: NA  Social:4    Clinical assessment of mental status: Patient presented alert and oriented x3.  She was well-groomed.  Her attire was appropriate.  Patient was cooperative.  Patient motor actively was normal and eye contact appropriate.  Patients mood was anxious and affect congruent.  Patient s speech and language was normal.  Patient attention and thought process normal.  Concentration was appropriate.  Patient denied delusions or hallucinations. Patient denied suicidal or homicidal ideation.  Patient denied any long or short term memory problems. No evidence of impairment in judgment. She has insight and fund of knowledge was adequate.        Suicidal/Homicidal Ideation present: None Reported This Session    Patient's impression of their current status: Pt reports increase/new physical health issues that has caused increase in stress, depression and anxiety.     Therapist impression of patients current state: Processed recent health issues and the impact on her mental health.  Pt brought in book that she reports she has been trying to read for several years  \"Breaking Intimidation\", processed her struggles to finish the book.  Discussed her desire to do \"natural\" forms of medical care rather than taking more medications.  Processed her frustration and feelings of abandonment by medical providers.   Pt expressed feeling dismissed and unheard by providers, processed those feelings today.  Gave her book \"Vibrational medicine\" to look at if interested.  She has been looking into alternative strategies to " cope with pain (oils, herbs, vitamins).      Type of psychotherapeutic technique provided: Insight oriented, Client centered, Solution-focused and CBT    Progress toward short term goals:Progress as expected, coping skills, stress management, self-care    Review of long term goals: reduce symptoms of depression and anxiety    Diagnosis:   1. 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 Generalized anxiety disorder    3. Chronic pain syndrome        Plan and Follow up: follow up with Brinda in 2 weeks  Follow up with all providers as schedule  Read/look at book on Vibrational Medicine.  Practice coping skills as discussed    Discharge Criteria/Planning: Patient will continue with follow-up until therapy can be discontinued without return of signs and symptoms.    Ricarda Burns 8/16/2018

## 2021-06-19 NOTE — PROGRESS NOTES
Discussion:   Patient continues to tell care guides that she would like to un-enroll from [facility] due to being busy with medical appointments. Her last goal was to schedule apts on her own, pt has done this.     Plan:   Since pt no longer wants to participate in [facility], we will graduate pt from [facility] at this time since goals completed. Will send to [facility] RN for graduation review. If this patient needs care coordination in the future, please place a referral.

## 2021-06-19 NOTE — PROGRESS NOTES
[facility] Daily Progress     Patient Name: Sandy Spencer  Date: 2018  Visit #:5    Referral Diagnosis: Bilat  Leg pain  Referring provider: Michael Ramirez DO  Visit Diagnosis: No diagnosis found.      Assessment:     Patient is benefitting from skilled physical therapy and is making steady progress toward functional goals.  Patient is appropriate to continue with skilled physical therapy intervention, as indicated by initial plan of care.    Goal Status:  Pt. will demonstrate/verbalize independence in self-management of condition in : 12 weeks  Pt. will report decreased intensity, frequency of : Pain;in 12 weeks;Comment  Comment:: decrease pain to 2-7/10 with ADLs  Pt. will have improved quality of sleep: waking less times/night;getting 75-90% of required amount;in 12 weeks;Comment  Comment:: increase sleep to 4-6 hours  Pt. will be able to walk : 30 minutes;on even surfaces;with less difficulty;for household mobility;for community mobility;for exercise/recreation;in 12 weeks  Patient will stand : 30 minutes;with less difficultty;for home chores;in 12 weeks  Patient will sit: 30 minutes;for driving;for eating;for watching TV;with less difficultty;in 12 weeks  Patient will reach / maintain arm movement: overhead;for home chores;for dressing;with less difficulty;in 12 weeks  Patient will decrease : TIMA score;by _ points;for improved quality of function;for improved quality of life;in 12 weeks  by ___ points: 15    Plan / Patient Education:     Continue with initial plan of care.    Subjective:     Pain Ratin-7/10  Legs feel cold    Sleep is affected   Use sleeping pills   Sleep 3-5 hrs      Objective:     + Scan  CN 5,7,10 Lside.  All Cranial Nerves   Ringing in the ears     Treatment Today     TREATMENT MINUTES COMMENTS   Evaluation     Self-care/ Home management     Manual therapy 60 Cranial releases Temporal   Frontal,  Nasal, SCS  Cranial nerve releases to L ABD-N,  OM-N,   OLF-N,    TRO-N,   VEST-N    HYP-N,   SCS to [employer] L T1, 3-N.   Pt instructed in Temporal Bone release    Pt ind in temporal bone release.   Pt can now hear better along with decrease in the ear ringing.   MF tone to the neck and head reduced   Neuromuscular Re-education     Therapeutic Activity     Therapeutic Exercises     Gait training     Modality__________________                Total 60    Blank areas are intentional and mean the treatment did not include these items.       Yogi Velazquez  8/6/2018

## 2021-06-19 NOTE — PROGRESS NOTES
"Kerry reviews multiple concerns.  She has had a recurrence of another episode of shingles down her left leg.  She has been placed on acyclovir.    She had a flareup of abdominal pain, with pancreatitis, elevated lipase.  Describes her reluctance to go to the hospital and that she try to manage at home with bowel rest and fluids.  She did see her GI doctor, laboratories had resolved.  She reviews it was a \"miracle\" with friends praying for her.    She is at increased GI complaints with inflammation.  She will have a colonoscopy tomorrow.    She notes she is lost 10 pounds.    She has been seen by cardiology told she has a 75% risk of a heart attack.  She has peripheral vascular disease.  We discussed the lower extremity cold feeling she had previously.  She was told that is attributed to complex regional pain syndrome.    She reviews reading about herbal preparations, a friend giving her an essential  with frankincense.  She has used some of that finding a \"soothing.    She did have an injection in her left hip on 8/1.  Continues to have some pain.    She did look into the DEXA scan, not covered by insurance she cannot afford this.    She has been trying some low-dose progesterone, does not find it helpful does not want to continue taking.    She has been seeing Ricarda in psychotherapy, saw her today, reviewed some family dynamics.    The N acetylcysteine was not covered by insurance she cannot afford other out-of-pocket cost.    She reviews being told she had some findings on thoracic MRI, wonders if that area at T12 radiates to her area of a sense of pancreatic pain.  We discussed both episodes of flareups have been associated with increased lipase, and we would expect otherwise some ongoing persistent pain.  She thinks that that is just made it worse, and that she does have some ongoing pain.  We reviewed discussing that with Dr. Mittal.    She has been using the fentanyl 50 mcg patch.  She is concerned " that sleep problems, talking so much, other symptoms are related to undertreated pain and she is very distressed.  We reviewed she has used higher doses of fentanyl in the past, had made efforts to decrease this.  However given some concern for costs out of pocket for other adjunctive treatments, reviewed the option of increasing the fentanyl which she would like to do at this time.    As with the trial of Ambien parasomnias, walking in her apartment in the middle night observed by others.     reviewed, as expected.      Current Outpatient Prescriptions:      acetylcysteine, bulk, Powd, 600 mg capsules, take orally three times a day, Disp: 42 Bottle, Rfl: 2     aspirin 81 MG EC tablet, Take 81 mg by mouth daily., Disp: , Rfl:      atorvastatin (LIPITOR) 80 MG tablet, Take 1 tablet (80 mg total) by mouth at bedtime., Disp: 90 tablet, Rfl: 3     azelastine (ASTEPRO) 0.15 % (205.5 mcg) Spry nasal spray, 1 spray by Left Nare route 2 (two) times a day as needed., Disp: , Rfl:      cholecalciferol, vitamin D3, 5,000 unit Tab, Take 1 tablet by mouth daily., Disp: , Rfl:      diphenhydrAMINE (BENADRYL) 25 mg capsule, Take 25 mg by mouth every 6 (six) hours as needed. , Disp: , Rfl:      eszopiclone (LUNESTA) 3 mg tablet, TK 1 T PO IMMEDIATELY BEFORE BEDTIME QPM, Disp: , Rfl: 2     fentaNYL (DURAGESIC) 50 mcg/hr, Place 1 patch on the skin every third day., Disp: 10 patch, Rfl: 0     levothyroxine (SYNTHROID, LEVOTHROID) 50 MCG tablet, Take 1 tablet (50 mcg total) by mouth daily., Disp: 90 tablet, Rfl: 0     naloxone (NARCAN) 4 mg/actuation nasal spray, 1 spray (4 mg dose) into one nostril for opioid reversal. Call 911. May repeat if no response in 3 minutes., Disp: 1 Box, Rfl: 0     OMEGA-3/DHA/EPA/FISH OIL (FISH OIL-OMEGA-3 FATTY ACIDS) 300-1,000 mg capsule, Take 1.2 g by mouth 2 (two) times a day., Disp: , Rfl:      progesterone (PROMETRIUM) 100 MG capsule, Take 1 capsule (100 mg total) by mouth daily., Disp: 30  "capsule, Rfl: 1     psyllium (METAMUCIL) 3.4 gram packet, Take 1 packet by mouth 2 (two) times a day., Disp: , Rfl: 0     rosuvastatin (CRESTOR) 40 MG tablet, Take 1 tablet (40 mg total) by mouth daily., Disp: 90 tablet, Rfl: 3     spironolactone (ALDACTONE) 25 MG tablet, Take 1 tablet (25 mg total) by mouth daily., Disp: 90 tablet, Rfl: 1     SUMAtriptan (IMITREX) 50 MG tablet, Take 1 tablet (50 mg total) by mouth every 2 (two) hours as needed for migraine., Disp: 27 tablet, Rfl: 1     topiramate (TOPAMAX) 25 MG tablet, Take 1 tablet (25 mg total) by mouth 2 (two) times a day., Disp: 180 tablet, Rfl: 1     traZODone (DESYREL) 100 MG tablet, TAKE 2 TO 4 TABLETS(200  MG) BY MOUTH AT BEDTIME, Disp: 360 tablet, Rfl: 0     valACYclovir (VALTREX) 1000 MG tablet, Take 1 tablet (1,000 mg total) by mouth 3 (three) times a day for 7 days., Disp: 21 tablet, Rfl: 0     fentaNYL (DURAGESIC) 75 mcg/hr, Place 1 patch on the skin every third day., Disp: 10 patch, Rfl: 0    Current Facility-Administered Medications:      denosumab 60 mg (PROLIA 60 mg/ml), 60 mg, Subcutaneous, Q6 Months, Andrei Olivera MD, 60 mg at 08/13/18 1126  Blood pressure 122/74, pulse 93, resp. rate 16, height 5' 2\" (1.575 m), weight 155 lb 8 oz (70.5 kg), not currently breastfeeding.    Pain score 9.  She is alert with a clear sensorium appropriately groomed thought process tight and logical.  Speech is rapid but much less pressured than when last seen with concerns for manic phase.    Lower extremities do not show changes in coloration swelling skin texture.    Impression: Chronic pain includes back pain with a history of a laminectomy, right lower extremity chronic regional pain syndrome, abdominal pain with history of gallbladder disease and pancreatitis, migraine headache.    There are significant psychosocial stressors, anxiety.    Plan we will increase the fentanyl patch to 75 mcg.    We will discussed with Dr. Mittal thoracic steroid " injections.  Note MRI on 4/1218 from [facility] does show some mild to moderate foraminal compromise at this level.    She will continue work with herbal preparations that she is doing.    She continues to have trust in psychotherapy.    Time spent more than 40 minutes face-to-face 50% counseling about above condition coordination treatment plan

## 2021-06-19 NOTE — LETTER
Letter by Caitlyn Rees MD at      Author: Caitlyn Rees MD Service: -- Author Type: --    Filed:  Encounter Date: 10/3/2019 Status: Signed         Sandy Spencer  [address]  [place] [address]             October 3, 2019         Dear Ms. Spencer,    Below are the results from your recent visit:    Resulted Orders   Comprehensive Metabolic Panel   Result Value Ref Range    Sodium 138 136 - 145 mmol/L    Potassium 3.7 3.5 - 5.0 mmol/L    Chloride 108 (H) 98 - 107 mmol/L    CO2 21 (L) 22 - 31 mmol/L    Anion Gap, Calculation 9 5 - 18 mmol/L    Glucose 88 70 - 125 mg/dL    BUN 24 8 - 28 mg/dL    Creatinine 1.41 (H) 0.60 - 1.10 mg/dL    GFR MDRD Af Amer 44 (L) >60 mL/min/1.73m2    GFR MDRD Non Af Amer 36 (L) >60 mL/min/1.73m2    Bilirubin, Total 0.4 0.0 - 1.0 mg/dL    Calcium 8.7 8.5 - 10.5 mg/dL    Protein, Total 6.5 6.0 - 8.0 g/dL    Albumin 4.0 3.5 - 5.0 g/dL    Alkaline Phosphatase 41 (L) 45 - 120 U/L    AST 19 0 - 40 U/L    ALT 15 0 - 45 U/L    Narrative    Fasting Glucose reference range is 70-99 mg/dL per  American Diabetes Association (ADA) guidelines.   HM2(CBC w/o Differential)   Result Value Ref Range    WBC 3.9 (L) 4.0 - 11.0 thou/uL    RBC 3.45 (L) 3.80 - 5.40 mill/uL    Hemoglobin 11.7 (L) 12.0 - 16.0 g/dL    Hematocrit 33.7 (L) 35.0 - 47.0 %    MCV 98 80 - 100 fL    MCH 34.0 27.0 - 34.0 pg    MCHC 34.8 32.0 - 36.0 g/dL    RDW 13.3 11.0 - 14.5 %    Platelets 168 140 - 440 thou/uL    MPV 7.2 7.0 - 10.0 fL   C-Reactive Protein   Result Value Ref Range    CRP 0.2 0.0 - 0.8 mg/dL   Urinalysis Macroscopic   Result Value Ref Range    Color, UA Yellow Colorless, Yellow, Straw, Light Yellow    Clarity, UA Slightly Cloudy (!) Clear    Glucose, UA Negative Negative    Bilirubin, UA Negative Negative    Ketones, UA Negative Negative    Specific Gravity, UA 1.015 1.005 - 1.030    Blood, UA Trace (!) Negative    pH, UA 7.5 5.0 - 8.0    Protein, UA Trace (!)  Negative mg/dL    Urobilinogen, UA 0.2 E.U./dL 0.2 E.U./dL, 1.0 E.U./dL    Nitrite, UA Negative Negative    Leukocytes, UA Negative Negative   Culture, Urine   Result Value Ref Range    Culture No Growth        Your hemoglobin is down a tad, increase your iron intake to help with the fatigue. Your electrolytes are normal and your kidneys are stable.     Your urine did not show an infection, but there is still blood in there, we'll have to watch this.     Please call with questions or contact us using [employer].    Sincerely,        Electronically signed by Caitlyn Rees MD

## 2021-06-19 NOTE — LETTER
Letter by Caitlyn Rees MD at      Author: Caitlyn Rees MD Service: -- Author Type: --    Filed:  Encounter Date: 11/6/2019 Status: Signed         Sandy Spencer  [address]  [facility] [address]             November 7, 2019         Dear Ms. Spencer,    Below are the results from your recent visit:    Resulted Orders   Lipase   Result Value Ref Range    Lipase 37 0 - 52 U/L   Celiac(Gluten)Antibody Panel   Result Value Ref Range    Gliadin IgA 0.4 0.0-<7.0 U/mL    Gliadin IgG <0.4 0.0-<7.0 U/mL    Tissue Transglutaminase IgG AB <0.6 0.0-<7.0 U/mL    Tissue Transglutaminase IgA AB 0.1 0.0-<7.0 U/mL    Immunoglobulin A 128 65 - 400 mg/dL    Narrative      < 7 U/mL = Negative    7-10 U/mL = Equivocal    > 10 U/mL = Positive    Positive results for the tTG and/or gliadin antibodies indicate possible celiac disease and a small intestinal biopsy my be indicated. Antibody levels decrease in patients on gluten-free diets; therefore, negative results do not exclude celiac disease. Total serum IgA is measured to identify selective IgA deficiency, which is present in up to 10% of celiac disease patients. Such patients would have negative results on IgA assays, but may have positive results on IgG antibody assays.   Insulin-Like Growth Factor 1,LC-MS   Result Value Ref Range    Insulin Like Growth Fact 1 76 20 - 214 ng/ml      Comment:        Performed and/or entered by:  [facility]  [address] [place] [address]   Cortisol   Result Value Ref Range    Cortisol 15.4 ug/dL    Narrative    Reference Range:    a.m.: 7-25 ug/dL  p.m.: 2-13 ug/dL   Iron and Transferrin Iron Binding Capacity   Result Value Ref Range    Iron 58 42 - 175 ug/dL    Transferrin 182 (L) 212 - 360 mg/dL    Transferrin Saturation, Calculated 25 20 - 50 %    Transferrin IBC, Calculated 228 (L) 313 - 563 ug/dL   Ferritin   Result Value Ref Range    Ferritin 174 (H)  10 - 130 ng/mL   Thyroid Stimulating Hormone (TSH)   Result Value Ref Range    TSH 0.62 0.30 - 5.00 uIU/mL   T4, Free   Result Value Ref Range    Free T4 1.3 0.7 - 1.8 ng/dL   T3 (Triiodothyronine), Free   Result Value Ref Range    T3, Free 2.8 1.9 - 3.9 pg/mL   Comprehensive Metabolic Panel   Result Value Ref Range    Sodium 141 136 - 145 mmol/L    Potassium 3.9 3.5 - 5.0 mmol/L    Chloride 111 (H) 98 - 107 mmol/L    CO2 19 (L) 22 - 31 mmol/L    Anion Gap, Calculation 11 5 - 18 mmol/L    Glucose 81 70 - 125 mg/dL    BUN 16 8 - 28 mg/dL    Creatinine 1.32 (H) 0.60 - 1.10 mg/dL    GFR MDRD Af Amer 47 (L) >60 mL/min/1.73m2    GFR MDRD Non Af Amer 39 (L) >60 mL/min/1.73m2    Bilirubin, Total 0.5 0.0 - 1.0 mg/dL    Calcium 8.2 (L) 8.5 - 10.5 mg/dL    Protein, Total 6.2 6.0 - 8.0 g/dL    Albumin 3.8 3.5 - 5.0 g/dL    Alkaline Phosphatase 40 (L) 45 - 120 U/L    AST 23 0 - 40 U/L    ALT 13 0 - 45 U/L    Narrative    Fasting Glucose reference range is 70-99 mg/dL per  American Diabetes Association (ADA) guidelines.   Lipid Cascade   Result Value Ref Range    Cholesterol 220 (H) <=199 mg/dL    Triglycerides 100 <=149 mg/dL    HDL Cholesterol 78 >=50 mg/dL    LDL Calculated 122 <=129 mg/dL    Patient Fasting > 8hrs? Unknown    HM1 (CBC with Diff)   Result Value Ref Range    WBC 3.2 (L) 4.0 - 11.0 thou/uL    RBC 3.43 (L) 3.80 - 5.40 mill/uL    Hemoglobin 11.5 (L) 12.0 - 16.0 g/dL    Hematocrit 33.8 (L) 35.0 - 47.0 %    MCV 99 80 - 100 fL    MCH 33.4 27.0 - 34.0 pg    MCHC 34.0 32.0 - 36.0 g/dL    RDW 12.3 11.0 - 14.5 %    Platelets 165 140 - 440 thou/uL    MPV 6.9 (L) 7.0 - 10.0 fL    Neutrophils % 49 (L) 50 - 70 %    Lymphocytes % 38 20 - 40 %    Monocytes % 9 2 - 10 %    Eosinophils % 4 0 - 6 %    Basophils % 1 0 - 2 %    Neutrophils Absolute 1.6 (L) 2.0 - 7.7 thou/uL    Lymphocytes Absolute 1.2 0.8 - 4.4 thou/uL    Monocytes Absolute 0.3 0.0 - 0.9 thou/uL    Eosinophils Absolute 0.1 0.0 - 0.4 thou/uL    Basophils Absolute  0.0 0.0 - 0.2 thou/uL       Here are your labs from last week.     Please call with questions or contact us using [employer].    Sincerely,        Electronically signed by Caitlyn Rees MD

## 2021-06-19 NOTE — PROGRESS NOTES
Care guide spoke to Sandy today who tells me she was considering unenrolling from [facility]. She spoke to Dr Rees about this and they have decided to wait as she has some medical things going on and will be doing a number of tests in the upcoming weeks. She may need additional coordination at that time. Overall today she is doing well. She has all of her appointments in order and will follow up with Dr Rees on 7/9/18. She would like a phone call back to discuss her decisions regarding [facility] and her needs in about a month.     Next outreach 8/1/18
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
## 2021-06-19 NOTE — PROGRESS NOTES
ASSESSMENT/PLAN:       1. Shingles  -Her exam and history is quite consistent with shingles, I am starting her on Valtrex for the next week and she will follow-up if things are not improving.  - valACYclovir (VALTREX) 1000 MG tablet; Take 1 tablet (1,000 mg total) by mouth 3 (three) times a day for 7 days.  Dispense: 21 tablet; Refill: 0    2. Mixed hyperlipidemia  -We discussed the risks and benefits of starting Crestor and she is agreeable to this at this time.  I did send a prescription to the pharmacy and she will follow-up here in approximately 1 month for recheck and updated labs.  - rosuvastatin (CRESTOR) 40 MG tablet; Take 1 tablet (40 mg total) by mouth daily.  Dispense: 90 tablet; Refill: 3    3. Chronic pain syndrome  -The patient and I spent some time discussing the possible pain management solutions for her.  She does still want to continue to work with her current pain physician she is just overall frustrated with her pain situation.  She is trialing some more homeopathic methods including a frankincense cream and she is going to work on compounding some of these creams as well.    4. Essential hypertension  -Blood pressure is normal today, amlodipine was difficult to split and we had restarted this with the hope that it would help with her RSD in the legs.  It has not made any difference, so we are discontinuing the amlodipine today and we will watch her blood pressure.    5.  Diarrhea  -She does have a colonoscopy scheduled for the next few weeks from now, and we did discuss her prep with [facility].  This is changed secondary to insurance issues as well as her kidney disease.      50 minutes was spent face-to-face with the patient during this encounter and >50% of this was spent on counseling and coordination of care. We discussed in depth the topics listed above.       Caitlyn Rees MD      PROGRESS NOTE   8/8/2018    SUBJECTIVE:  Sandy Spencer is a 77 y.o. female  who presents for  follow up.     She continues to have issues with her pain.  She did meet with an alternative pain clinic and the recommendations were either a continuous pain pump or spinal cord stimulator.  Given these options she is not sure that she would like to proceed with either one and she was told in the past by a surgeon that her body does not tolerate metal implantations well.  She is hoping she can just have a more rewarding relationship with Dr. Lynn as she does not feel that their communication is working at this time.    She does worry that she has shingles again. This is the fourth time.  She does have discomfort shooting down the sciatic nerve on the left side as well.  She has been increasingly stressed at home.  The rash started in the last 4-5 days, the discomfort is burning in nature and the burning did start prior to the rash.  She has not had a shingles shot.  She thinks she is increasingly stressed as her upstairs neighbor watches several great-great-grandchildren several days a week.  Last night there is still up after midnight and they were up this morning before 6.  She has talked to her apartment management about this and they apparently did talk to this individual but she has not heard any resolution of it yet.    She has seen cardiology as well.  She is wondering about going back to Crestor.  In the past when she got a prescription for this it was quite expensive however now most recently when she saw her cardiologist was recommended to consider this as her LDL is still over 130 while on 80 mg of atorvastatin.  She otherwise tolerated it fine when she was on the Crestor.  She wanted me to go over her coronary artery calcium scores as well.  I did go over these with her.    Lastly she does have a colonoscopy scheduled in the next few weeks and she is questioning the colonoscopy prep.  Chief Complaint   Patient presents with     Follow-up     Patient is here today for her two week follow up. Patient  would like to discuss her visit yesterday with [facility].     Rash     Patient has a rash on her lower back, she wonders if it could be shingles. The area is painful to the touch.      Colonoscopy     Patient will be having a colonscopy on 8/14/18, she is concerned about the prep. She has been having troubles getting in touch with [facility] to discuss.          Patient Active Problem List   Diagnosis     Chronic kidney disease (CKD) stage G3a/A1, moderately decreased glomerular filtration rate (GFR) between 45-59 mL/min/1.73 square meter and albuminuria creatinine ratio less than 30 mg/g     Focal glomerular sclerosis     Anxiety and depression     RSD lower limb, bilateral     ADD (attention deficit disorder)     RSD upper limb, right     Other specified hypothyroidism     Anxiety     Osteopenia     Major depression     Hypertension     Insomnia     Mixed hyperlipidemia     Right rotator cuff tear     Cluster headaches     Lumbar radiculopathy     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     Reflex sympathetic dystrophy     Acute encephalopathy     Temporomandibular joint-pain-dysfunction syndrome (TMJ)     Pancreatitis     Localized swelling of lower leg     Medical cannabis use     Acute right-sided low back pain without sciatica     Acute exacerbation of chronic low back pain     Acquired hypothyroidism     Chronic pain syndrome     Non morbid obesity due to excess calories     Snoring     Inadequate pain control     Diarrhea     Abdominal pain     Dermatochalasis of left eyelid     Bilateral carotid artery stenosis       Current Outpatient Prescriptions   Medication Sig Dispense Refill     acetylcysteine, bulk, Powd 600 mg capsules, take orally three times a day 42 Bottle 2     aspirin 81 MG EC tablet Take 81 mg by mouth daily.       atorvastatin (LIPITOR) 80 MG tablet Take 1 tablet (80 mg total) by mouth at bedtime. 90 tablet 3     azelastine (ASTEPRO) 0.15 % (205.5 mcg) Spry nasal spray 1 spray  by Left Nare route 2 (two) times a day as needed.       cholecalciferol, vitamin D3, 5,000 unit Tab Take 1 tablet by mouth daily.       diphenhydrAMINE (BENADRYL) 25 mg capsule Take 25 mg by mouth every 6 (six) hours as needed.        eszopiclone (LUNESTA) 3 mg tablet TK 1 T PO IMMEDIATELY BEFORE BEDTIME QPM  2     fentaNYL (DURAGESIC) 50 mcg/hr Place 1 patch on the skin every third day. 10 patch 0     levothyroxine (SYNTHROID, LEVOTHROID) 50 MCG tablet Take 1 tablet (50 mcg total) by mouth daily. 90 tablet 0     naloxone (NARCAN) 4 mg/actuation nasal spray 1 spray (4 mg dose) into one nostril for opioid reversal. Call 911. May repeat if no response in 3 minutes. 1 Box 0     OMEGA-3/DHA/EPA/FISH OIL (FISH OIL-OMEGA-3 FATTY ACIDS) 300-1,000 mg capsule Take 1.2 g by mouth 2 (two) times a day.       progesterone (PROMETRIUM) 100 MG capsule Take 1 capsule (100 mg total) by mouth daily. 30 capsule 1     psyllium (METAMUCIL) 3.4 gram packet Take 1 packet by mouth 2 (two) times a day.  0     rosuvastatin (CRESTOR) 40 MG tablet Take 1 tablet (40 mg total) by mouth daily. 90 tablet 3     spironolactone (ALDACTONE) 25 MG tablet Take 1 tablet (25 mg total) by mouth daily. 90 tablet 1     SUMAtriptan (IMITREX) 50 MG tablet Take 1 tablet (50 mg total) by mouth every 2 (two) hours as needed for migraine. 27 tablet 1     topiramate (TOPAMAX) 25 MG tablet Take 1 tablet (25 mg total) by mouth 2 (two) times a day. 180 tablet 1     traZODone (DESYREL) 100 MG tablet TAKE 2 TO 4 TABLETS(200  MG) BY MOUTH AT BEDTIME 360 tablet 0     valACYclovir (VALTREX) 1000 MG tablet Take 1 tablet (1,000 mg total) by mouth 3 (three) times a day for 7 days. 21 tablet 0     Current Facility-Administered Medications   Medication Dose Route Frequency Provider Last Rate Last Dose     denosumab 60 mg (PROLIA 60 mg/ml)  60 mg Subcutaneous Q6 Months Andrei Olivera MD   60 mg at 02/09/18 1505       History   Smoking Status     Former Smoker      Packs/day: 1.00     Years: 20.00     Quit date: 1/1/2000   Smokeless Tobacco     Never Used           OBJECTIVE:        Recent Results (from the past 240 hour(s))   CTA Coronary W Calcium Score   Result Value Ref Range    HR 68 bpm    BSA 1.72 m2    Agatston Score of Left Main 5     Agatston Score of the Left Anterior Descending 35     Agatston Score of Circumflex 25     Agatston Score of Right Coronary Artery 95     Total Score 160.00    Creatinine   Result Value Ref Range    Creatinine 0.81 0.60 - 1.10 mg/dL    GFR MDRD Af Amer >60 >60 mL/min/1.73m2    GFR MDRD Non Af Amer >60 >60 mL/min/1.73m2       Vitals:    08/08/18 1157   BP: 120/68   Patient Site: Left Arm   Patient Position: Sitting   Cuff Size: Adult Regular   Pulse: 78   SpO2: 98%   Weight: 155 lb 8 oz (70.5 kg)     Weight: 155 lb 8 oz (70.5 kg)        Physical Exam:  GENERAL APPEARANCE: A&A, NAD, well hydrated, well nourished  SKIN:  Normal skin turgor, typical shingles rash on left buttock  HEENT: moist mucous membranes, no rhinorrhea  EXTREMITY: no edema   NEURO: no gross deficits   Psych: Her affect is anxious

## 2021-06-19 NOTE — PROGRESS NOTES
"Mental Health Visit Note    8/1/2018   Start time: 1400    Stop Time: 1450   Session # 11    Session Type: Patient is presenting for an Individual session.    Sandy Spencer is a 77 y.o. female is being seen today for    Chief Complaint   Patient presents with     mental health visit   .     New symptoms or complaints: None    Functional Impairment:   Personal: 4  Family: 2  Work: 4  Social:4    Clinical assessment of mental status: Patient presented alert and oriented x3.  She was well-groomed.  Her attire was appropriate.  Patient was cooperative.  Patient motor actively was normal and eye contact appropriate.  Patients mood was anxious and affect tearful.  Patient s speech and language was normal.  Patient attention and thought process normal.  Concentration was appropriate.  Patient denied delusions or hallucinations. Patient denied suicidal or homicidal ideation.  Patient denied any long or short term memory problems. No evidence of impairment in judgment. She has insight and fund of knowledge was adequate.        Suicidal/Homicidal Ideation present: None Reported This Session    Patient's impression of their current status: PT reports significant health issues greatly impacting her depression symptoms.     Therapist impression of patients current state: Pt reporting recent hospitalization and significant health issues greatly impacting her depression.   She reports frustration with some providers and feeling \"Tossed away\".  Discussed patients feelings related to experience and validity behind it.  Pt reports she has no idea how to help her pain at this time and feels like her depression is increasing.  Encouraged her to speak with her PCP regarding medication options for depression symptoms.  Reports she has been sleep walking, which is scary and upsetting to patient. Also encouraged her to speak with PCP.  Pt processed her financial struggles and hopelessness that her health will improve.  Discussed other " frustration in her life causing increase in stress.  Discussed options for patient to help her depression (non-medication related).      Type of psychotherapeutic technique provided: Insight oriented, Client centered, Solution-focused and CBT    Progress toward short term goals:Progress as expected, increase in medical issues causing increase in depression    Review of long term goals: decreasing symptoms of depression & axiety    Diagnosis:   1. 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 Generalized anxiety disorder    3. Chronic pain syndrome        Plan and Follow up: Follow up with PCP to discuss concerns  Follow up with all recommendations from PCP  Take medications as prescribed  Follow up with Brinda in 1-2 weeks (sooner if needed)  Practice self-care strategies      Discharge Criteria/Planning: Patient will continue with follow-up until therapy can be discontinued without return of signs and symptoms.    Ricarda Burns 8/2/2018

## 2021-06-19 NOTE — PROGRESS NOTES
[facility] Daily Progress     Patient Name: Sandy Spencer  Date: 8/20/2018  Visit #:8/12    Referral Diagnosis: LBP   LE Pain  Referring provider: Michael Ramirez DO  Visit Diagnosis:     ICD-10-CM    1. Lumbar radiculopathy M54.16    2. Complex regional pain syndrome type 1 of both lower extremities G90.523    3. 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 M48.07    4. Diffuse myofascial pain syndrome M79.1    5. Left-sided low back pain without sciatica, unspecified chronicity M54.5    6. Chronic pain syndrome G89.4          Assessment:     Patient demonstrates understanding/independence with home program.  Patient is benefitting from skilled physical therapy and is making steady progress toward functional goals.  Patient is appropriate to continue with skilled physical therapy intervention, as indicated by initial plan of care.    Goal Status:  Pt. will demonstrate/verbalize independence in self-management of condition in : 12 weeks  Pt. will report decreased intensity, frequency of : Pain;in 12 weeks;Comment  Comment:: decrease pain to 2-7/10 with ADLs  Pt. will have improved quality of sleep: waking less times/night;getting 75-90% of required amount;in 12 weeks;Comment  Comment:: increase sleep to 4-6 hours  Pt. will be able to walk : 30 minutes;on even surfaces;with less difficulty;for household mobility;for community mobility;for exercise/recreation;in 12 weeks  Patient will stand : 30 minutes;with less difficultty;for home chores;in 12 weeks  Patient will sit: 30 minutes;for driving;for eating;for watching TV;with less difficultty;in 12 weeks  Patient will reach / maintain arm movement: overhead;for home chores;for dressing;with less difficulty;in 12 weeks  Patient will decrease : TIMA score;by _ points;for improved quality of function;for improved quality of life;in 12 weeks  by ___ points: 15    Plan / Patient Education:     Continue with initial plan of care.    Subjective:     Pain  Ratin  Knees hurt along with the LB      Objective:     Pt's session devote to TENS instruction in use and applicatio    Treatment Today     TREATMENT MINUTES COMMENTS   Evaluation     Self-care/ Home management     Manual therapy     Neuromuscular Re-education     Therapeutic Activity     Therapeutic Exercises     Gait training     Modality___TENS SET UP_______________ 30 Pt provided with information and instruction in the use of her TENS unit for the control of her bilat knee pain.   Handouts provided    Pt is ind with the use of the device.              Total 30    Blank areas are intentional and mean the treatment did not include these items.       Yogi Velazquez  2018

## 2021-06-19 NOTE — LETTER
Letter by Caitlyn Rees MD at      Author: Caitlyn Rees MD Service: -- Author Type: --    Filed:  Encounter Date: 11/6/2019 Status: Signed         Sandy Spencer  [address]  [facility] [address]             November 6, 2019         Dear Ms. Spencer,    Below are the results from your recent visit:    Resulted Orders   Lipase   Result Value Ref Range    Lipase 37 0 - 52 U/L   Celiac(Gluten)Antibody Panel   Result Value Ref Range    Gliadin IgA 0.4 0.0-<7.0 U/mL    Gliadin IgG <0.4 0.0-<7.0 U/mL    Tissue Transglutaminase IgG AB <0.6 0.0-<7.0 U/mL    Tissue Transglutaminase IgA AB 0.1 0.0-<7.0 U/mL    Immunoglobulin A 128 65 - 400 mg/dL    Narrative      < 7 U/mL = Negative    7-10 U/mL = Equivocal    > 10 U/mL = Positive    Positive results for the tTG and/or gliadin antibodies indicate possible celiac disease and a small intestinal biopsy my be indicated. Antibody levels decrease in patients on gluten-free diets; therefore, negative results do not exclude celiac disease. Total serum IgA is measured to identify selective IgA deficiency, which is present in up to 10% of celiac disease patients. Such patients would have negative results on IgA assays, but may have positive results on IgG antibody assays.   Insulin-Like Growth Factor 1,LC-MS   Result Value Ref Range    Insulin Like Growth Fact 1 76 20 - 214 ng/ml      Comment:        Performed and/or entered by:  [facility]  [address] [place] [address]   Cortisol   Result Value Ref Range    Cortisol 15.4 ug/dL    Narrative    Reference Range:    a.m.: 7-25 ug/dL  p.m.: 2-13 ug/dL   Iron and Transferrin Iron Binding Capacity   Result Value Ref Range    Iron 58 42 - 175 ug/dL    Transferrin 182 (L) 212 - 360 mg/dL    Transferrin Saturation, Calculated 25 20 - 50 %    Transferrin IBC, Calculated 228 (L) 313 - 563 ug/dL   Ferritin   Result Value Ref Range    Ferritin 174 (H)  10 - 130 ng/mL   Thyroid Stimulating Hormone (TSH)   Result Value Ref Range    TSH 0.62 0.30 - 5.00 uIU/mL   T4, Free   Result Value Ref Range    Free T4 1.3 0.7 - 1.8 ng/dL   T3 (Triiodothyronine), Free   Result Value Ref Range    T3, Free 2.8 1.9 - 3.9 pg/mL   Comprehensive Metabolic Panel   Result Value Ref Range    Sodium 141 136 - 145 mmol/L    Potassium 3.9 3.5 - 5.0 mmol/L    Chloride 111 (H) 98 - 107 mmol/L    CO2 19 (L) 22 - 31 mmol/L    Anion Gap, Calculation 11 5 - 18 mmol/L    Glucose 81 70 - 125 mg/dL    BUN 16 8 - 28 mg/dL    Creatinine 1.32 (H) 0.60 - 1.10 mg/dL    GFR MDRD Af Amer 47 (L) >60 mL/min/1.73m2    GFR MDRD Non Af Amer 39 (L) >60 mL/min/1.73m2    Bilirubin, Total 0.5 0.0 - 1.0 mg/dL    Calcium 8.2 (L) 8.5 - 10.5 mg/dL    Protein, Total 6.2 6.0 - 8.0 g/dL    Albumin 3.8 3.5 - 5.0 g/dL    Alkaline Phosphatase 40 (L) 45 - 120 U/L    AST 23 0 - 40 U/L    ALT 13 0 - 45 U/L    Narrative    Fasting Glucose reference range is 70-99 mg/dL per  American Diabetes Association (ADA) guidelines.   Lipid Cascade   Result Value Ref Range    Cholesterol 220 (H) <=199 mg/dL    Triglycerides 100 <=149 mg/dL    HDL Cholesterol 78 >=50 mg/dL    LDL Calculated 122 <=129 mg/dL    Patient Fasting > 8hrs? Unknown    HM1 (CBC with Diff)   Result Value Ref Range    WBC 3.2 (L) 4.0 - 11.0 thou/uL    RBC 3.43 (L) 3.80 - 5.40 mill/uL    Hemoglobin 11.5 (L) 12.0 - 16.0 g/dL    Hematocrit 33.8 (L) 35.0 - 47.0 %    MCV 99 80 - 100 fL    MCH 33.4 27.0 - 34.0 pg    MCHC 34.0 32.0 - 36.0 g/dL    RDW 12.3 11.0 - 14.5 %    Platelets 165 140 - 440 thou/uL    MPV 6.9 (L) 7.0 - 10.0 fL    Neutrophils % 49 (L) 50 - 70 %    Lymphocytes % 38 20 - 40 %    Monocytes % 9 2 - 10 %    Eosinophils % 4 0 - 6 %    Basophils % 1 0 - 2 %    Neutrophils Absolute 1.6 (L) 2.0 - 7.7 thou/uL    Lymphocytes Absolute 1.2 0.8 - 4.4 thou/uL    Monocytes Absolute 0.3 0.0 - 0.9 thou/uL    Eosinophils Absolute 0.1 0.0 - 0.4 thou/uL    Basophils Absolute  0.0 0.0 - 0.2 thou/uL       Your hemoglobin is stable, your pituitary gland is working fine as well. Your kidneys are a tad improved also. Overall, this is great news.     Please call with questions or contact us using [employer].    Sincerely,        Electronically signed by Caitlyn Rees MD
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
## 2021-06-19 NOTE — PROGRESS NOTES
Coronary CTA with calcium Score:  Table issue for test resolved.  Proceeded with test.  2nd SL NTG given along with 2nd dose of IV contrast.  Order for creatinine tomorrow per Dr. Ybarra.  Dr. Rees's office called and notified.  Office will call pt yet today to schedule lab time for tomorrow.  Pt laurent test well.  VSS.  See vitals.  CTA reader, Dr. Paul, notified when test completed by Constantine Yepez CT tech.  Pt given CT water bottle.  Instructed to drink at least 2 of the bottles of water tonight.  Drank 480ml of water prior to IV discontinued.  Explained need for creatinine lab after receiving 2 dosed of IV contrast.  Pt aware clinic will be calling her to schedule her lab draw for tomorrow.   Interpretation of CCTA  pending.
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
## 2021-06-19 NOTE — LETTER
Letter by Caitlyn Rees MD at      Author: Caitlyn Rees MD Service: -- Author Type: --    Filed:  Encounter Date: 9/5/2019 Status: (Other)         Sandy Spencer  [address]  [facility] [address]             September 5, 2019         Dear Ms. Spencer,    Below are the results from your recent visit:    Resulted Orders   Vitamin D, Total (25-Hydroxy)   Result Value Ref Range    Vitamin D, Total (25-Hydroxy) 77.5 30.0 - 80.0 ng/mL    Narrative    Deficiency <10.0 ng/mL  Insufficiency 10.0-29.9 ng/mL  Sufficiency 30.0-80.0 ng/mL  Toxicity (possible) >100.0 ng/mL   HM2(CBC w/o Differential)   Result Value Ref Range    WBC 4.6 4.0 - 11.0 thou/uL    RBC 3.68 (L) 3.80 - 5.40 mill/uL    Hemoglobin 12.5 12.0 - 16.0 g/dL    Hematocrit 35.7 35.0 - 47.0 %    MCV 97 80 - 100 fL    MCH 33.9 27.0 - 34.0 pg    MCHC 34.9 32.0 - 36.0 g/dL    RDW 12.9 11.0 - 14.5 %    Platelets 188 140 - 440 thou/uL    MPV 7.3 7.0 - 10.0 fL   Basic Metabolic Panel   Result Value Ref Range    Sodium 138 136 - 145 mmol/L    Potassium 4.0 3.5 - 5.0 mmol/L    Chloride 107 98 - 107 mmol/L    CO2 20 (L) 22 - 31 mmol/L    Anion Gap, Calculation 11 5 - 18 mmol/L    Glucose 83 70 - 125 mg/dL    Calcium 9.0 8.5 - 10.5 mg/dL    BUN 18 8 - 28 mg/dL    Creatinine 1.26 (H) 0.60 - 1.10 mg/dL    GFR MDRD Af Amer 50 (L) >60 mL/min/1.73m2    GFR MDRD Non Af Amer 41 (L) >60 mL/min/1.73m2    Narrative    Fasting Glucose reference range is 70-99 mg/dL per  American Diabetes Association (ADA) guidelines.   Erythrocyte Sedimentation Rate   Result Value Ref Range    Sed Rate 10 0 - 20 mm/hr   C-Reactive Protein   Result Value Ref Range    CRP <0.1 0.0 - 0.8 mg/dL       Your labs are overall normal. Your kidney function is stable, your hemoglobin has improved.     Your vitamin D has increased quite a bit, please decrease your intake by about 800 IU of D3 daily to make sure this doesn't go too high.     Your infection  markers are back to normal as well.     Please call with questions or contact us using [employer].    Sincerely,        Electronically signed by Caitlyn Rees MD

## 2021-06-20 NOTE — PROGRESS NOTES
[facility] Daily Progress     Patient Name: Sandy Spencer  Date: 2018  Visit #:    Referral Diagnosis: LBP   LE Pain  Referring provider: Michael Ramirez DO  Visit Diagnosis:     ICD-10-CM    1. Diffuse myofascial pain syndrome M79.1    2. Chronic pain syndrome G89.4    3. Thoracic spine pain M54.6    4. Cervical radiculitis M54.12    5. Lumbar radiculopathy M54.16          Assessment:     Patient demonstrates understanding/independence with home program.  Patient is benefitting from skilled physical therapy and is making steady progress toward functional goals.  Patient is appropriate to continue with skilled physical therapy intervention, as indicated by initial plan of care.  She felt much better post treatment today. There was good release of fascial tesnions treated today.     Goal Status:  Pt. will demonstrate/verbalize independence in self-management of condition in : 12 weeks  Pt. will report decreased intensity, frequency of : Pain;in 12 weeks;Comment  Comment:: decrease pain to 2-7/10 with ADLs  Pt. will have improved quality of sleep: waking less times/night;getting 75-90% of required amount;in 12 weeks;Comment  Comment:: increase sleep to 4-6 hours  Pt. will be able to walk : 30 minutes;on even surfaces;with less difficulty;for household mobility;for community mobility;for exercise/recreation;in 12 weeks  Patient will stand : 30 minutes;with less difficultty;for home chores;in 12 weeks  Patient will sit: 30 minutes;for driving;for eating;for watching TV;with less difficultty;in 12 weeks  Patient will reach / maintain arm movement: overhead;for home chores;for dressing;with less difficulty;in 12 weeks  Patient will decrease : TIMA score;by _ points;for improved quality of function;for improved quality of life;in 12 weeks  by ___ points: 15    Plan / Patient Education:     Continue with initial plan of care.    Subjective:     Pain Ratin   With the weather being damp and raining  it really makes it difficult for her. It is really difficult to even sleep due to the pain in her arm and leg on the R side. If she pushes on her triceps it will help but it is painful.   She did get some needles from Dr. Lynn yesterday.     Objective:     Neural, MS fascial tensions.     Treatment Today     TREATMENT MINUTES COMMENTS   Evaluation     Self-care/ Home management     Manual therapy 25 Fascial release using Strain-Counterstrain of RUE (P)-MS, RUE/brachial plexus-N, thoracic E (P)-MS   Neuromuscular Re-education 15 Recip inhib of neural, MS fascia   Therapeutic Activity     Therapeutic Exercises 15 AA/PROM to ribs, T-spine, C-spine, cranium   Gait training     Modality___ UP_______________                Total 55    Blank areas are intentional and mean the treatment did not include these items.       Rudolph Molina  9/5/2018

## 2021-06-20 NOTE — PROGRESS NOTES
Mental Health Visit Note    9/4/2018   Start time: 1300    Stop Time: 1350   Session # 12    Session Type: Patient is presenting for an Individual session.    Sadny Spencer is a 77 y.o. female is being seen today for    Chief Complaint   Patient presents with     Depression/anxiety   .     New symptoms or complaints: None    Functional Impairment:   Personal: 4  Family: 4  Work: 4  Social:4    Clinical assessment of mental status: Patient presented alert and oriented x3.  She was well-groomed.  Her attire was appropriate.  Patient was cooperative.  Patient motor actively was normal and eye contact appropriate.  Patients mood was anxious and affect tearful.  Patient s speech and language was normal.  Patient attention and thought process normal.  Concentration was appropriate.  Patient denied delusions or hallucinations. Patient denied suicidal or homicidal ideation.  Patient denied any long or short term memory problems. No evidence of impairment in judgment. She has insight and fund of knowledge was adequate.        Suicidal/Homicidal Ideation present: None Reported This Session    Patient's impression of their current status: Pt reports increased depression & anxiety symptoms due to multiple psychosocial stressors.     Therapist impression of patients current state: Pt processed current stressors related her the relationship between her and her children.  Processed patient's fears and barriers to obtaining a relationship with one of her daughters.  Discussed communication strategies and possible ways to approach situation.  Pt asked if she could invite daughter into one of her psychotherapy session, which would be appropriate.  Processed patient's struggle with chronic pain and coping strategies.     Type of psychotherapeutic technique provided: Insight oriented, Client centered, Solution-focused and CBT    Progress toward short term goals:Progress as expected, communication, emotional regulation, self-care,  coping skills    Review of long term goals: decrease symptoms of depression, anxiety and improve coping strategies for chronic pain.     Diagnosis:   1. 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 Generalized anxiety disorder    3. Chronic pain syndrome        Plan and Follow up: You are able to bring children into session if they are interested  Follow up with Brinda in 2 weeks  Follow up with all providers as scheduled  Practice self-care and coping skills as discussed      Discharge Criteria/Planning: Patient will continue with follow-up until therapy can be discontinued without return of signs and symptoms.    Ricarda Burns 9/6/2018

## 2021-06-20 NOTE — PROGRESS NOTES
Reflex patient seen and primary care provider's office for diarrhea.    In interview she reviews doing  poorly.  There is a significant stressor in the building, young children are apparently being babysat by a grandparent of the building, children have been there as late as 12:20 in the morning, and as early as 6:45 in the morning.  She has heard children running around.  She notes her Fitbit showing her she is getting very limited sleep recently.  She believes the stresses exacerbating her CRPS pain.    She indicates her hands are painful, her neck and right shoulder are painful.  She is concerned in the T12 area where she wonders if there is pancreatic pain radiating there, though has been negative for pancreatitis.    We had discussed increasing the fentanyl patch to 75 mcg, though she does not want to do that if she is concerned about driving.  She would like to have a TENS unit ordered.  She would also like to see if she is eligible for an injection with Dr. Mittal.  I did discuss this further with Dr. Mittal where he thought a thoracic epidural steroid injection could be considered.    Reviews the stressors related to shingles also may be related.    She will be seeing Dr. Bobo in GI soon to consider another stent if needed.    She has been fasting or having just bone broth concern for GI symptoms.  It is been negative for C. difficile twice.  She has not losing weight at this point.    She is concerned about sleep.  Agent such as Ambien causes parasomnias.    She is on trazodone 100 mg up to 3 or 4 tablets at night.  She is thinking of adding an Thurmond's wort.  She did not feel Remeron was helpful.  She describes having difficulty turning off her racing thoughts.    She did see Brinda in therapy earlier today.     is reviewed, as expected      Current Outpatient Prescriptions:      acetylcysteine, bulk, Powd, 600 mg capsules, take orally three times a day, Disp: 42 Bottle, Rfl: 2     aspirin 81 MG EC  tablet, Take 81 mg by mouth daily., Disp: , Rfl:      atorvastatin (LIPITOR) 80 MG tablet, Take 1 tablet (80 mg total) by mouth at bedtime., Disp: 90 tablet, Rfl: 3     azelastine (ASTEPRO) 0.15 % (205.5 mcg) Spry nasal spray, 1 spray by Left Nare route 2 (two) times a day as needed., Disp: , Rfl:      cholecalciferol, vitamin D3, 5,000 unit Tab, Take 1 tablet by mouth daily., Disp: , Rfl:      diphenhydrAMINE (BENADRYL) 25 mg capsule, Take 25 mg by mouth every 6 (six) hours as needed. , Disp: , Rfl:      fentaNYL (DURAGESIC) 75 mcg/hr, Place 1 patch on the skin every third day., Disp: 10 patch, Rfl: 0     loperamide (IMODIUM A-D) 2 mg tablet, Take 1 tablet (2 mg total) by mouth 4 (four) times a day as needed for diarrhea., Disp: 60 tablet, Rfl: 1     naloxone (NARCAN) 4 mg/actuation nasal spray, 1 spray (4 mg dose) into one nostril for opioid reversal. Call 911. May repeat if no response in 3 minutes., Disp: 1 Box, Rfl: 0     OMEGA-3/DHA/EPA/FISH OIL (FISH OIL-OMEGA-3 FATTY ACIDS) 300-1,000 mg capsule, Take 1.2 g by mouth 2 (two) times a day., Disp: , Rfl:      psyllium (METAMUCIL) 3.4 gram packet, Take 1 packet by mouth 2 (two) times a day., Disp: , Rfl: 0     rosuvastatin (CRESTOR) 40 MG tablet, Take 1 tablet (40 mg total) by mouth daily., Disp: 90 tablet, Rfl: 3     spironolactone (ALDACTONE) 25 MG tablet, Take 1 tablet (25 mg total) by mouth daily., Disp: 90 tablet, Rfl: 1     SUMAtriptan (IMITREX) 50 MG tablet, Take 1 tablet (50 mg total) by mouth every 2 (two) hours as needed for migraine., Disp: 27 tablet, Rfl: 1     topiramate (TOPAMAX) 50 MG tablet, Take 1 tablet (50 mg total) by mouth 2 (two) times a day., Disp: 180 tablet, Rfl: 1     traZODone (DESYREL) 100 MG tablet, TAKE 2 TO 4 TABLETS(200  MG) BY MOUTH AT BEDTIME, Disp: 360 tablet, Rfl: 1     [START ON 9/19/2018] fentaNYL (DURAGESIC) 50 mcg/hr, Place 1 patch on the skin every third day., Disp: 10 patch, Rfl: 0     [START ON 9/5/2018]  "levothyroxine (SYNTHROID, LEVOTHROID) 50 MCG tablet, Take 1 tablet (50 mcg total) by mouth daily., Disp: 90 tablet, Rfl: 0     prazosin (MINIPRESS) 1 MG capsule, One bedtime for 5 nights, may increase to two bedtime for five nights, if needed three bedtime, Disp: 60 capsule, Rfl: 2    Current Facility-Administered Medications:      denosumab 60 mg (PROLIA 60 mg/ml), 60 mg, Subcutaneous, Q6 Months, Andrei Olivera MD, 60 mg at 08/13/18 1126  Blood pressure 165/82, pulse 71, resp. rate 16, height 5' 2\" (1.575 m), weight 149 lb 3.2 oz (67.7 kg), not currently breastfeeding.      Current Outpatient Prescriptions:      acetylcysteine, bulk, Powd, 600 mg capsules, take orally three times a day, Disp: 42 Bottle, Rfl: 2     aspirin 81 MG EC tablet, Take 81 mg by mouth daily., Disp: , Rfl:      atorvastatin (LIPITOR) 80 MG tablet, Take 1 tablet (80 mg total) by mouth at bedtime., Disp: 90 tablet, Rfl: 3     azelastine (ASTEPRO) 0.15 % (205.5 mcg) Spry nasal spray, 1 spray by Left Nare route 2 (two) times a day as needed., Disp: , Rfl:      cholecalciferol, vitamin D3, 5,000 unit Tab, Take 1 tablet by mouth daily., Disp: , Rfl:      diphenhydrAMINE (BENADRYL) 25 mg capsule, Take 25 mg by mouth every 6 (six) hours as needed. , Disp: , Rfl:      fentaNYL (DURAGESIC) 75 mcg/hr, Place 1 patch on the skin every third day., Disp: 10 patch, Rfl: 0     loperamide (IMODIUM A-D) 2 mg tablet, Take 1 tablet (2 mg total) by mouth 4 (four) times a day as needed for diarrhea., Disp: 60 tablet, Rfl: 1     naloxone (NARCAN) 4 mg/actuation nasal spray, 1 spray (4 mg dose) into one nostril for opioid reversal. Call 911. May repeat if no response in 3 minutes., Disp: 1 Box, Rfl: 0     OMEGA-3/DHA/EPA/FISH OIL (FISH OIL-OMEGA-3 FATTY ACIDS) 300-1,000 mg capsule, Take 1.2 g by mouth 2 (two) times a day., Disp: , Rfl:      psyllium (METAMUCIL) 3.4 gram packet, Take 1 packet by mouth 2 (two) times a day., Disp: , Rfl: 0     rosuvastatin " "(CRESTOR) 40 MG tablet, Take 1 tablet (40 mg total) by mouth daily., Disp: 90 tablet, Rfl: 3     spironolactone (ALDACTONE) 25 MG tablet, Take 1 tablet (25 mg total) by mouth daily., Disp: 90 tablet, Rfl: 1     SUMAtriptan (IMITREX) 50 MG tablet, Take 1 tablet (50 mg total) by mouth every 2 (two) hours as needed for migraine., Disp: 27 tablet, Rfl: 1     topiramate (TOPAMAX) 50 MG tablet, Take 1 tablet (50 mg total) by mouth 2 (two) times a day., Disp: 180 tablet, Rfl: 1     traZODone (DESYREL) 100 MG tablet, TAKE 2 TO 4 TABLETS(200  MG) BY MOUTH AT BEDTIME, Disp: 360 tablet, Rfl: 1     [START ON 9/19/2018] fentaNYL (DURAGESIC) 50 mcg/hr, Place 1 patch on the skin every third day., Disp: 10 patch, Rfl: 0     [START ON 9/5/2018] levothyroxine (SYNTHROID, LEVOTHROID) 50 MCG tablet, Take 1 tablet (50 mcg total) by mouth daily., Disp: 90 tablet, Rfl: 0     prazosin (MINIPRESS) 1 MG capsule, One bedtime for 5 nights, may increase to two bedtime for five nights, if needed three bedtime, Disp: 60 capsule, Rfl: 2    Current Facility-Administered Medications:      denosumab 60 mg (PROLIA 60 mg/ml), 60 mg, Subcutaneous, Q6 Months, Andrei Olivera MD, 60 mg at 08/13/18 1126  Blood pressure 165/82, pulse 71, resp. rate 16, height 5' 2\" (1.575 m), weight 149 lb 3.2 oz (67.7 kg), not currently breastfeeding.    Score 8.  She is alert with a clear sensorium good eye contact.  Thought process tight logical.  Appropriately groomed, affect is quite anxious.    Impression: Chronic pain includes complex regional pain syndrome, primarily lower extremities.  Has had migraine headaches.  Has had recurrent GI complaints.    There is been comorbid medical problems as above.    Significant psychosocial stressors with elements of psychophysiologic arousal.    Plan: We will schedule with Dr. Mittal to consider a T12 epidural steroid injection versus trigger point injections.    We will help her obtain a TENS unit through [facility] supply.    Her sleep I suggested she does not use the [place] as there is a risk for serotonin syndrome with a high trazodone.  Instead we will have a trial of prazosin.  Benefits of alpha adrenergic agent with her anxiety discussed.  Reviewed cautions with low blood pressure and orthostasis.  We will start with 1 mg bedtime, increasing slowly as tolerated perhaps 3 mg.    I did provide a letter for her to take to manage where she lives indicating that the presence of the children are disrupting her sleep and adversely affected her medical condition.  [place] people where she thinks therapy rules around children visiting.    Time spent 25 minutes face-to-face more than 50% count about above condition coordination treatment

## 2021-06-20 NOTE — LETTER
Letter by Caitlyn Rees MD at      Author: Caitlyn Rees MD Service: -- Author Type: --    Filed:  Encounter Date: 3/5/2020 Status: (Other)         Sandy Spencer  [address]  [facility] [address]             March 5, 2020         Dear Ms. Spencer,    Below are the results from your recent visit:    Resulted Orders   Basic Metabolic Panel   Result Value Ref Range    Sodium 137 136 - 145 mmol/L    Potassium 3.8 3.5 - 5.0 mmol/L    Chloride 104 98 - 107 mmol/L    CO2 21 (L) 22 - 31 mmol/L    Anion Gap, Calculation 12 5 - 18 mmol/L    Glucose 88 70 - 125 mg/dL    Calcium 8.9 8.5 - 10.5 mg/dL    BUN 17 8 - 28 mg/dL    Creatinine 1.08 0.60 - 1.10 mg/dL    GFR MDRD Af Amer 60 (L) >60 mL/min/1.73m2    GFR MDRD Non Af Amer 49 (L) >60 mL/min/1.73m2    Narrative    Fasting Glucose reference range is 70-99 mg/dL per  American Diabetes Association (ADA) guidelines.   Ferritin   Result Value Ref Range    Ferritin 473 (H) 10 - 130 ng/mL   Iron and Transferrin Iron Binding Capacity   Result Value Ref Range    Iron 72 42 - 175 ug/dL    Transferrin 190 (L) 212 - 360 mg/dL    Transferrin Saturation, Calculated 30 20 - 50 %    Transferrin IBC, Calculated 237 (L) 313 - 563 ug/dL       Your kidneys are improved which is great news. Your iron is a bit better as well.     Please call with questions or contact us using [email or web address].    Sincerely,        Electronically signed by Caitlyn Rees MD
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
## 2021-06-20 NOTE — PROGRESS NOTES
[facility] Daily Progress   [facility] Re-Certification Request    September 24, 2018      Patient: Sandy Spencer  MR Number: [id number]  YOB: 1940  Date of Visit: 9/10/2018  Onset Date:  6/18  Date of Eval: 7/13/18    Dear Dr. Rees    As you may recall, we have been seeing Sandy Spencer for Physical Therapy of Neck and Back.    For therapy to continue, Medicare and/or Medicaid requires periodic physician review of the treatment plan. Please review the summary of the patient's progress and our plan for continued therapy, and verify  that you agree therapy should continue by entering certification dates at the bottom of this note and co-signing this note.    Plan of Care  No Data Recorded    Goal  Pt. will demonstrate/verbalize independence in self-management of condition in : Progressing toward  Pt. will report decreased intensity, frequency of : Progressing toward  Comment:: decrease pain to 2-7/10 with ADLs  Pt. will have improved quality of sleep: Partially met  Comment:: increase sleep to 4-6 hours  Pt. will be able to walk : Progressing toward  Patient will stand : Partially met  Patient will sit: Met  Patient will reach / maintain arm movement: Partially met  Patient will decrease : Progressing toward  by ___ points: 15      If you have any questions or concerns, please don't hesitate to call.    Sincerely,      Yogi Velazquez, PT        Physician recommendation:     ___ Follow therapist's recommendation        ___ Modify therapy      *Physician co-signature indicates they certify the need for these services furnished within this plan and while under their care.      Patient Name: Sandy Spencer  Date: 9/24/2018  Visit #: 9/12    Referral Diagnosis: Neck, Back pain  Referring provider: Michael Ramirez, DO  Visit Diagnosis:     ICD-10-CM    1. Diffuse myofascial pain syndrome M79.1    2. Chronic pain syndrome G89.4    3. Thoracic spine pain M54.6    4. Cervical  radiculitis M54.12    5. Lumbar radiculopathy M54.16    6. Complex regional pain syndrome type 1 of both lower extremities G90.523    7. Left lower quadrant pain R10.32    8. Acute exacerbation of chronic low back pain M54.5     G89.29    9. 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 M48.07    10. Temporomandibular joint-pain-dysfunction syndrome (TMJ) M26.629          Assessment:     Patient demonstrates understanding/independence with home program.  Patient is benefitting from skilled physical therapy and is making steady progress toward functional goals.  Patient is appropriate to continue with skilled physical therapy intervention, as indicated by initial plan of care.    Goal Status:  Pt. will demonstrate/verbalize independence in self-management of condition in : Progressing toward  Pt. will report decreased intensity, frequency of : Progressing toward  Comment:: decrease pain to 2-7/10 with ADLs  Pt. will have improved quality of sleep: Partially met  Comment:: increase sleep to 4-6 hours  Pt. will be able to walk : Progressing toward  Patient will stand : Partially met  Patient will sit: Met  Patient will reach / maintain arm movement: Partially met  Patient will decrease : Progressing toward  by ___ points: 15    Plan / Patient Education:     Continue with initial plan of care.    Subjective:     Pain Ratin  Pain to R Upper quadrant, Neck      Objective:     + Scan  Post Neuro.   Preganglionics above T 8,   Ant neuro Shoulders,   Lymphatics  Thorax and neck    TMJ pain,  L>R    Treatment Today     TREATMENT MINUTES COMMENTS   Evaluation     Self-care/ Home management     Manual therapy 60 MFR OA,  SCS to Preganglionics T1-3-N.   SCS to Lymphatics Neck and upper thorax.   SCS to Upper extremity neuro chain PALM 1-4-N bilat  SCS to the neck, head     Pain to the neck head, thorax, TMJ's and upper back reduced 8/10 to 2/10.   Lymphatic flow to the Head, neck and thorax normalized.   Nerve tender  points to the UE and neck reduced or resolved.   Pt is ind with temporal bone release   Neuromuscular Re-education     Therapeutic Activity     Therapeutic Exercises     Gait training     Modality__________________                Total 60    Blank areas are intentional and mean the treatment did not include these items.       Yogi Velazquez  9/24/2018

## 2021-06-20 NOTE — LETTER
Letter by Caitlyn Rees MD at      Author: Caitlyn Rees MD Service: -- Author Type: --    Filed:  Encounter Date: 8/5/2020 Status: (Other)         August 5, 2020     Patient: Sandy Spencer   YOB: 1940   Date of Visit: 8/5/2020       To Whom it May Concern:    Sandy Spencer was seen in my clinic on 8/5/2020. Due to unforseen medical issues she needs to move to an alternate living situation. Due to this, it is my opinion that she should not have to pay the monthly rent for the time remaining on her lease.     If you have any questions or concerns, please don't hesitate to call.    Sincerely,         Electronically signed by Caitlyn Rees MD

## 2021-06-20 NOTE — LETTER
Letter by Caitlyn Rees MD at      Author: Caitlyn Rees MD Service: -- Author Type: --    Filed:  Encounter Date: 8/18/2020 Status: (Other)         August 18, 2020     Patient: Sandy Spencer   YOB: 1940   Date of Visit: 8/05/2020       To Whom it May Concern:    Sandy Spencer has been permanently disabled for many years due to severe reflex sympathetic dystrophy. She did qualify for social security/disability due to this disorder and her inability to stand for any long period of time and was forced to retire prior to age 67 due to this.     If you have any questions or concerns, please don't hesitate to call.    Sincerely,         Electronically signed by Caitlyn Rees MD

## 2021-06-20 NOTE — PROGRESS NOTES
[facility] Daily Progress     Patient Name: Sandy Spencer  Date: 9/25/2018  Visit #: 10/12    Referral Diagnosis: Neck, Back Pain  Referring provider: Michael Ramirez DO  Visit Diagnosis:     ICD-10-CM    1. Diffuse myofascial pain syndrome M79.1    2. Chronic pain syndrome G89.4    3. Cervical radiculitis M54.12    4. Thoracic spine pain M54.6    5. Lumbar radiculopathy M54.16          Assessment:     Patient demonstrates understanding/independence with home program.  Patient is benefitting from skilled physical therapy and is making steady progress toward functional goals.  Patient is appropriate to continue with skilled physical therapy intervention, as indicated by initial plan of care.  She did have improvement in her pain post treatment. THere is likely a very strong association with central sensitization and her thinking about her pain.     Goal Status:  Pt. will demonstrate/verbalize independence in self-management of condition in : Progressing toward  Pt. will report decreased intensity, frequency of : Progressing toward  Comment:: decrease pain to 2-7/10 with ADLs  Pt. will have improved quality of sleep: Partially met  Comment:: increase sleep to 4-6 hours  Pt. will be able to walk : Progressing toward  Patient will stand : Partially met  Patient will sit: Met  Patient will reach / maintain arm movement: Partially met  Patient will decrease : Progressing toward  by ___ points: 15    Plan / Patient Education:     Continue with initial plan of care.    Subjective:     Pain Rating: 10  She is going to the bathroom like 10x/day and her heart MD said that it is her kidneys.   She is still very sore from the ride back from [place].     Objective:     Neural, MS, visc fascial tensions    Treatment Today     TREATMENT MINUTES COMMENTS   Evaluation     Self-care/ Home management     Manual therapy 25 Fascial release using Strain-Counterstrain of B abd-V, LFT1-MS L, R anterior hip-N    Neuromuscular Re-education 15 Recip inhib of neural, MS, visc fascia   Therapeutic Activity     Therapeutic Exercises 15 AA/PROM to BLE, pelvis, L-T spine   Gait training     Modality__________________                Total 55    Blank areas are intentional and mean the treatment did not include these items.       Rudolph Molina  9/25/2018

## 2021-06-20 NOTE — PROGRESS NOTES
"Chief Complaint   Patient presents with     Herpes Zoster     Pt c/o shingles pain at base of buttocks/above crack       HPI: 78-year-old female with a complicated past medical history including ADD, RSD, anxiety, depression, migraine headaches, presents today with new onset rash on her left lower lumbar region.  She is a poor historian but tells me that it occurred approximately 4-5 days ago.  She describes it as a mild constant burning pain.    ROS: No fever.  No bowel or bladder difficulties reported.    SH:    reports that she quit smoking about 18 years ago. She has a 20.00 pack-year smoking history. She has never used smokeless tobacco. She reports that she does not drink alcohol or use illicit drugs.      FH: The Patient's family history includes Aortic aneurysm in her mother; Heart disease in her father and mother; Kidney disease in her father and mother; Stroke in her father.     Meds:  Sandy has a current medication list which includes the following prescription(s): acetylcysteine (bulk), aspirin, azelastine, cholecalciferol (vitamin d3), diphenhydramine, fentanyl, fentanyl, levothyroxine, loperamide, naloxone, fish oil-omega-3 fatty acids, prazosin, psyllium, rosuvastatin, spironolactone, sumatriptan, topiramate, trazodone, and lidocaine, and the following Facility-Administered Medications: denosumab.    O:  /68  Pulse (!) 101  Resp 16  Ht 5' 2\" (1.575 m)  Wt 146 lb 6 oz (66.4 kg)  SpO2 97%  BMI 26.77 kg/m2  Examination with a nurse in the room shows a vesicular serpiginous rash of approximately 2 cm diameter in the left lower lumbar region.    A/P:   1. Shingles  -This is the fifth episode that she has had.  She is on multiple chronic pain medications including fentanyl patch and oxycodone.  Recommended adding lidocaine gel for symptomatic relief.    -Patient asked me to refill all of her pain and migraine medications and I have referred her to Dr. Rees.    - lidocaine (XYLOCAINE) 5 % " ointment; Apply to lumbar back area three times a day PRN pain  Dispense: 35.44 g; Refill: 0

## 2021-06-20 NOTE — LETTER
Letter by Caitlyn Rees MD at      Author: Caitlyn Rees MD Service: -- Author Type: --    Filed:  Encounter Date: 4/7/2020 Status: (Other)         Sandy Spencer  [address]  [place] [address]             April 7, 2020         Dear Ms. Spencer,    Below are the results from your recent visit:    Resulted Orders   Iron and Transferrin Iron Binding Capacity   Result Value Ref Range    Iron 73 42 - 175 ug/dL    Transferrin 184 (L) 212 - 360 mg/dL    Transferrin Saturation, Calculated 32 20 - 50 %    Transferrin IBC, Calculated 230 (L) 313 - 563 ug/dL   Ferritin   Result Value Ref Range    Ferritin 365 (H) 10 - 130 ng/mL   Comprehensive Metabolic Panel   Result Value Ref Range    Sodium 139 136 - 145 mmol/L    Potassium 4.1 3.5 - 5.0 mmol/L    Chloride 105 98 - 107 mmol/L    CO2 23 22 - 31 mmol/L    Anion Gap, Calculation 11 5 - 18 mmol/L    Glucose 104 70 - 125 mg/dL    BUN 21 8 - 28 mg/dL    Creatinine 1.45 (H) 0.60 - 1.10 mg/dL    GFR MDRD Af Amer 42 (L) >60 mL/min/1.73m2    GFR MDRD Non Af Amer 35 (L) >60 mL/min/1.73m2    Bilirubin, Total 0.7 0.0 - 1.0 mg/dL    Calcium 8.2 (L) 8.5 - 10.5 mg/dL    Protein, Total 6.1 6.0 - 8.0 g/dL    Albumin 3.5 3.5 - 5.0 g/dL    Alkaline Phosphatase 40 (L) 45 - 120 U/L    AST 21 0 - 40 U/L    ALT 19 0 - 45 U/L    Narrative    Fasting Glucose reference range is 70-99 mg/dL per  American Diabetes Association (ADA) guidelines.   HM2(CBC w/o Differential)   Result Value Ref Range    WBC 4.4 4.0 - 11.0 thou/uL    RBC 3.78 (L) 3.80 - 5.40 mill/uL    Hemoglobin 12.4 12.0 - 16.0 g/dL    Hematocrit 36.6 35.0 - 47.0 %    MCV 97 80 - 100 fL    MCH 32.8 27.0 - 34.0 pg    MCHC 33.9 32.0 - 36.0 g/dL    RDW 13.3 11.0 - 14.5 %    Platelets 136 (L) 140 - 440 thou/uL    MPV 7.6 7.0 - 10.0 fL       Your hemoglobin and iron studies are just fine.     Your kidneys are down some again, I suspect due to not getting enough fluids in. Please make  sure you are drinking plenty of fluids.     Please call with questions or contact us using [employer].    Sincerely,        Electronically signed by Caitlyn Rees MD

## 2021-06-20 NOTE — LETTER
Letter by Caitlyn Rees MD at      Author: Caitlyn Rees MD Service: -- Author Type: --    Filed:  Encounter Date: 2/27/2020 Status: (Other)         Sandy Spencer  [address]  [facility] [address]             February 27, 2020         Dear Ms. Spencer,    Below are the results from your recent visit:    Resulted Orders   Glycosylated Hemoglobin A1c   Result Value Ref Range    Hemoglobin A1c 4.9 3.5 - 6.0 %     Your average blood sugar over the last 3 months was normal.     Please call with questions or contact us using [employer].    Sincerely,        Electronically signed by Caitlyn Rees MD

## 2021-06-20 NOTE — PROGRESS NOTES
[facility] Daily Progress     Patient Name: Sandy Spencer  Date: 9/24/2018  Visit #: 10/12    Referral Diagnosis: Neck, Back Pain  Referring provider: Michael Ramirez DO  Visit Diagnosis:     ICD-10-CM    1. Diffuse myofascial pain syndrome M79.1    2. Chronic pain syndrome G89.4    3. Cervical radiculitis M54.12    4. Thoracic spine pain M54.6    5. Lumbar radiculopathy M54.16    6. Left-sided low back pain without sciatica, unspecified chronicity M54.5          Assessment:     Patient demonstrates understanding/independence with home program.  Patient is benefitting from skilled physical therapy and is making steady progress toward functional goals.  Patient is appropriate to continue with skilled physical therapy intervention, as indicated by initial plan of care.    Goal Status:  Pt. will demonstrate/verbalize independence in self-management of condition in : 12 weeks  Pt. will report decreased intensity, frequency of : Pain;in 12 weeks;Comment  Comment:: decrease pain to 2-7/10 with ADLs  Pt. will have improved quality of sleep: waking less times/night;getting 75-90% of required amount;in 12 weeks;Comment  Comment:: increase sleep to 4-6 hours  Pt. will be able to walk : 30 minutes;on even surfaces;with less difficulty;for household mobility;for community mobility;for exercise/recreation;in 12 weeks  Patient will stand : 30 minutes;with less difficultty;for home chores;in 12 weeks  Patient will sit: 30 minutes;for driving;for eating;for watching TV;with less difficultty;in 12 weeks  Patient will reach / maintain arm movement: overhead;for home chores;for dressing;with less difficulty;in 12 weeks  Patient will decrease : TIMA score;by _ points;for improved quality of function;for improved quality of life;in 12 weeks  by ___ points: 15    Plan / Patient Education:     Continue with initial plan of care.    Subjective:     Pain Rating: 10  Return from a long trip during which the car broke down.   [place] in [place] tow ter[place]  Poor shocks  Bad on the back  Pain High      Objective:     R SI downslip,   Tenderpoints to fascia of the Ant and post pelvis    Treatment Today     TREATMENT MINUTES COMMENTS   Evaluation     Self-care/ Home management     Manual therapy 30 MFR with OA, L5-SI and SI joint releases, Dural Tube Pull.   SCS to bilat ACE's,   Ant L5's,  Uterus   Ided for pt Tens acupuncture points fo the placement of TENS electode pads for home use    SI dysf resolved   Tenderpoints reduced or resolved   Pain levels decreasing 10+/10  to 8/10   Neuromuscular Re-education     Therapeutic Activity     Therapeutic Exercises     Gait training     Modality__________________       30         Total     Blank areas are intentional and mean the treatment did not include these items.       Yogi Velazquez  9/24/2018

## 2021-06-20 NOTE — PROGRESS NOTES
ASSESSMENT/PLAN:       1. Diarrhea  -Has been chronic, is working with GI.  She will restart her fiber she has not been doing this, given the fact that she has no infections for now she can trial using some Imodium a few times a day as well and we will update her labs as listed below here in the next few weeks prior to her next gastroenterology appointment.  If she starts having troubles keeping fluids and she will let me know or if she has signs of dehydration.  - loperamide (IMODIUM A-D) 2 mg tablet; Take 1 tablet (2 mg total) by mouth 4 (four) times a day as needed for diarrhea.  Dispense: 60 tablet; Refill: 1  - Comprehensive Metabolic Panel; Future  - HM1(CBC and Differential); Future  - Magnesium; Future    2. Insomnia  -She has done well with trazodone in the past, hasn't done well with lunesta. Will restart this today and follow up in 6 weeks for a recheck.   - traZODone (DESYREL) 100 MG tablet; TAKE 2 TO 4 TABLETS(200  MG) BY MOUTH AT BEDTIME  Dispense: 360 tablet; Refill: 1    3. Cluster headaches  -Somewhat better with the Topamax, will increase dose slowly. She will go to 25 each morning and 50 at night for one week then increase to 50 mg two times a day. F/u in 6 weeks.   - topiramate (TOPAMAX) 50 MG tablet; Take 1 tablet (50 mg total) by mouth 2 (two) times a day.  Dispense: 180 tablet; Refill: 1    4. Mixed hyperlipidemia  -Due for labs, will update with her next draw  - Lipid Cascade; Future    5. Sphincter of Oddi dysfunction  -Will update lipase prior to her appointment with Dr. Bobo. When back will fax to GI and she will  a copy. [facility] filled out today  - Lipase; Future    6. Essential hypertension  -BP under ok control. Will watch for now.     7. RSD lower limb, bilateral  -Flaring due to stress. Updated letter for her living place about this.     8. Ingrown nail  -Referral placed.   - Ambulatory referral to Podiatry      40 minutes was spent face-to-face with the patient during this  encounter and >50% of this was spent on counseling and coordination of care. We discussed in depth the topics listed above.         Caitlyn Rees MD      PROGRESS NOTE   9/4/2018    SUBJECTIVE:  Sandy Spencer is a 77 y.o. female  who presents for follow up.     She does continue to have raging diarrhea issues. She is not eating well, eating bone broth, bland diet. She is fasting this morning. The only time that she was ok was the time that she brought the specimens in. She is having bowel movements 6-8 times a day. She did clean her colon out, thought that it would maybe help.  She has not been taking her fiber, she has not used Imodium or anything else like that.    She continues to have difficulty with sleeping. She is stressed due to this. Her upstairs neighbor is having little children in her apartment. She was found to be sleep walking in her building with the Lunesta. She has done well with the trazodone in the past.  She is wondering if she could restart this again today.  Because of the stress of these children upstairs above her she feels that this is making it much more difficult for her to sleep.  They start running around before 7 in the morning, are often running around after 10 PM.  She does think that this is flaring her RSD, additionally she does think this is why she had a shingles outbreak.    Due to the stress she is having her RSD flaring again.  She does follow with the pain clinic and there continue to work on pain management for her.  She was told that she should go up on her fentanyl patch and doesn't want to go up more. She does want a tens unit again to help with her pain. She has done well with them. She does have an old one now. She has had a few flares of her RSD in the past, she is worried this is going to go that way, she spent two years in bed prior to moving back to [place].     She continues to take her blood pressure medication as prescribed without  difficulty.    She does find that the Topamax is helping with her headaches, they are generally a little bit less present but still there on a daily basis.  She is wondering if she can increase her Topamax.  Chief Complaint   Patient presents with     Follow-up     Patient is here today for her one month follow up in regards to her hyperlipidemia, diarrhea and troubles with sleeping.          Patient Active Problem List   Diagnosis     Chronic kidney disease (CKD) stage G3a/A1, moderately decreased glomerular filtration rate (GFR) between 45-59 mL/min/1.73 square meter and albuminuria creatinine ratio less than 30 mg/g     Focal glomerular sclerosis     Anxiety and depression     RSD lower limb, bilateral     ADD (attention deficit disorder)     RSD upper limb, right     Other specified hypothyroidism     Anxiety     Osteopenia     Major depression     Hypertension     Insomnia     Mixed hyperlipidemia     Right rotator cuff tear     Cluster headaches     Lumbar radiculopathy     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     Reflex sympathetic dystrophy     Acute encephalopathy     Temporomandibular joint-pain-dysfunction syndrome (TMJ)     Pancreatitis     Localized swelling of lower leg     Medical cannabis use     Acute right-sided low back pain without sciatica     Acute exacerbation of chronic low back pain     Acquired hypothyroidism     Chronic pain syndrome     Non morbid obesity due to excess calories     Snoring     Inadequate pain control     Diarrhea     Abdominal pain     Dermatochalasis of left eyelid     Bilateral carotid artery stenosis       Current Outpatient Prescriptions   Medication Sig Dispense Refill     acetylcysteine, bulk, Powd 600 mg capsules, take orally three times a day 42 Bottle 2     aspirin 81 MG EC tablet Take 81 mg by mouth daily.       atorvastatin (LIPITOR) 80 MG tablet Take 1 tablet (80 mg total) by mouth at bedtime. 90 tablet 3     azelastine (ASTEPRO) 0.15 % (205.5 mcg)  Spry nasal spray 1 spray by Left Nare route 2 (two) times a day as needed.       cholecalciferol, vitamin D3, 5,000 unit Tab Take 1 tablet by mouth daily.       diphenhydrAMINE (BENADRYL) 25 mg capsule Take 25 mg by mouth every 6 (six) hours as needed.        OMEGA-3/DHA/EPA/FISH OIL (FISH OIL-OMEGA-3 FATTY ACIDS) 300-1,000 mg capsule Take 1.2 g by mouth 2 (two) times a day.       psyllium (METAMUCIL) 3.4 gram packet Take 1 packet by mouth 2 (two) times a day.  0     rosuvastatin (CRESTOR) 40 MG tablet Take 1 tablet (40 mg total) by mouth daily. 90 tablet 3     spironolactone (ALDACTONE) 25 MG tablet Take 1 tablet (25 mg total) by mouth daily. 90 tablet 1     SUMAtriptan (IMITREX) 50 MG tablet Take 1 tablet (50 mg total) by mouth every 2 (two) hours as needed for migraine. 27 tablet 1     topiramate (TOPAMAX) 50 MG tablet Take 1 tablet (50 mg total) by mouth 2 (two) times a day. 180 tablet 1     [START ON 9/19/2018] fentaNYL (DURAGESIC) 50 mcg/hr Place 1 patch on the skin every third day. 10 patch 0     fentaNYL (DURAGESIC) 75 mcg/hr Place 1 patch on the skin every third day. 10 patch 0     [START ON 9/5/2018] levothyroxine (SYNTHROID, LEVOTHROID) 50 MCG tablet Take 1 tablet (50 mcg total) by mouth daily. 90 tablet 0     loperamide (IMODIUM A-D) 2 mg tablet Take 1 tablet (2 mg total) by mouth 4 (four) times a day as needed for diarrhea. 60 tablet 1     naloxone (NARCAN) 4 mg/actuation nasal spray 1 spray (4 mg dose) into one nostril for opioid reversal. Call 911. May repeat if no response in 3 minutes. 1 Box 0     prazosin (MINIPRESS) 1 MG capsule One bedtime for 5 nights, may increase to two bedtime for five nights, if needed three bedtime 60 capsule 2     traZODone (DESYREL) 100 MG tablet TAKE 2 TO 4 TABLETS(200  MG) BY MOUTH AT BEDTIME 360 tablet 1     Current Facility-Administered Medications   Medication Dose Route Frequency Provider Last Rate Last Dose     denosumab 60 mg (PROLIA 60 mg/ml)  60 mg  Subcutaneous Q6 Months Andrei Olivera MD   60 mg at 08/13/18 1126       History   Smoking Status     Former Smoker     Packs/day: 1.00     Years: 20.00     Quit date: 1/1/2000   Smokeless Tobacco     Never Used           OBJECTIVE:        No results found for this or any previous visit (from the past 240 hour(s)).    Vitals:    09/04/18 1017 09/04/18 1103   BP: 140/70 152/80   Pulse: 86    Temp: 98.2  F (36.8  C)    TempSrc: Oral    SpO2: 98%    Weight: 149 lb 3.2 oz (67.7 kg)      Weight: 149 lb 3.2 oz (67.7 kg)        Physical Exam:  GENERAL APPEARANCE: A&A, NAD, well hydrated, well nourished  SKIN:  Normal skin turgor, no lesions/rashes   HEENT: moist mucous membranes, no rhinorrhea, pharynx is unremarkable  NECK: Normal  CV: RRR, no M/G/R   LUNGS: CTAB  ABDOMEN: S&NT, no masses   EXTREMITY: no edema, lateral aspect of the right foot has a small ingrown area on the toenail  NEURO: no gross deficits

## 2021-06-20 NOTE — LETTER
Letter by Caitlyn Rees MD at      Author: Caitlyn Rees MD Service: -- Author Type: --    Filed:  Encounter Date: 1/23/2020 Status: (Other)         Sandy Spencer  [address]  [place] [address]             January 23, 2020         Dear Ms. Spencer,    Below are the results from your recent visit:    Resulted Orders   Comprehensive Metabolic Panel   Result Value Ref Range    Sodium 139 136 - 145 mmol/L    Potassium 3.9 3.5 - 5.0 mmol/L    Chloride 103 98 - 107 mmol/L    CO2 25 22 - 31 mmol/L    Anion Gap, Calculation 11 5 - 18 mmol/L    Glucose 213 (H) 70 - 125 mg/dL    BUN 15 8 - 28 mg/dL    Creatinine 1.30 (H) 0.60 - 1.10 mg/dL    GFR MDRD Af Amer 48 (L) >60 mL/min/1.73m2    GFR MDRD Non Af Amer 40 (L) >60 mL/min/1.73m2    Bilirubin, Total 0.5 0.0 - 1.0 mg/dL    Calcium 8.3 (L) 8.5 - 10.5 mg/dL    Protein, Total 6.4 6.0 - 8.0 g/dL    Albumin 3.7 3.5 - 5.0 g/dL    Alkaline Phosphatase 44 (L) 45 - 120 U/L    AST 15 0 - 40 U/L    ALT <9 0 - 45 U/L    Narrative    Fasting Glucose reference range is 70-99 mg/dL per  American Diabetes Association (ADA) guidelines.   Ferritin   Result Value Ref Range    Ferritin 420 (H) 10 - 130 ng/mL   Iron and Transferrin Iron Binding Capacity   Result Value Ref Range    Iron 53 42 - 175 ug/dL    Transferrin 186 (L) 212 - 360 mg/dL    Transferrin Saturation, Calculated 23 20 - 50 %    Transferrin IBC, Calculated 232 (L) 313 - 563 ug/dL   HM1 (CBC with Diff)   Result Value Ref Range    WBC 3.8 (L) 4.0 - 11.0 thou/uL    RBC 3.80 3.80 - 5.40 mill/uL    Hemoglobin 12.2 12.0 - 16.0 g/dL    Hematocrit 39.1 35.0 - 47.0 %     (H) 80 - 100 fL    MCH 32.1 27.0 - 34.0 pg    MCHC 31.2 (L) 32.0 - 36.0 g/dL    RDW 12.9 11.0 - 14.5 %    Platelets 154 140 - 440 thou/uL    MPV 10.1 8.5 - 12.5 fL    Neutrophils % 66 50 - 70 %    Lymphocytes % 22 20 - 40 %    Monocytes % 10 2 - 10 %    Eosinophils % 1 0 - 6 %    Basophils % 1 0 - 2 %     Neutrophils Absolute 2.5 2.0 - 7.7 thou/uL    Lymphocytes Absolute 0.8 0.8 - 4.4 thou/uL    Monocytes Absolute 0.4 0.0 - 0.9 thou/uL    Eosinophils Absolute 0.0 0.0 - 0.4 thou/uL    Basophils Absolute 0.0 0.0 - 0.2 thou/uL   Lipase   Result Value Ref Range    Lipase 220 (H) 0 - 52 U/L     Your kidneys are better than last time, likely due to you feeling better.     Your hemoglobin is improving as well. Your iron tests look better as well, but are still a bit off. We'll watch this as you continue to work on eating.     Your blood sugar was high this time, next time you are in I would like to check your average blood sugar.     Please call with questions or contact us using [email or web address].    Sincerely,        Electronically signed by Caitlyn Rees MD

## 2021-06-20 NOTE — LETTER
Letter by Caitlyn Rees MD at      Author: Caitlyn Rees MD Service: -- Author Type: --    Filed:  Encounter Date: 8/6/2020 Status: (Other)         Sandy Spencer  [address]  [facility] [address]             August 6, 2020         Dear Ms. Spencer,    Below are the results from your recent visit:    Resulted Orders   CK Total   Result Value Ref Range    CK, Total 89 30 - 190 U/L   HM2(CBC w/o Differential)   Result Value Ref Range    WBC 5.5 4.0 - 11.0 thou/uL    RBC 3.58 (L) 3.80 - 5.40 mill/uL    Hemoglobin 11.7 (L) 12.0 - 16.0 g/dL    Hematocrit 34.2 (L) 35.0 - 47.0 %    MCV 96 80 - 100 fL    MCH 32.6 27.0 - 34.0 pg    MCHC 34.1 32.0 - 36.0 g/dL    RDW 12.6 11.0 - 14.5 %    Platelets 148 140 - 440 thou/uL    MPV 7.2 7.0 - 10.0 fL   Comprehensive Metabolic Panel   Result Value Ref Range    Sodium 136 136 - 145 mmol/L    Potassium 4.3 3.5 - 5.0 mmol/L    Chloride 98 98 - 107 mmol/L    CO2 29 22 - 31 mmol/L    Anion Gap, Calculation 9 5 - 18 mmol/L    Glucose 156 (H) 70 - 125 mg/dL    BUN 15 8 - 28 mg/dL    Creatinine 1.31 (H) 0.60 - 1.10 mg/dL    GFR MDRD Af Amer 48 (L) >60 mL/min/1.73m2    GFR MDRD Non Af Amer 39 (L) >60 mL/min/1.73m2    Bilirubin, Total 0.4 0.0 - 1.0 mg/dL    Calcium 9.0 8.5 - 10.5 mg/dL    Protein, Total 6.3 6.0 - 8.0 g/dL    Albumin 3.7 3.5 - 5.0 g/dL    Alkaline Phosphatase 48 45 - 120 U/L    AST 23 0 - 40 U/L    ALT 15 0 - 45 U/L    Narrative    Fasting Glucose reference range is 70-99 mg/dL per  American Diabetes Association (ADA) guidelines.       Your kidneys look better, hemoglobin is holding steady and testing looking at your muscles is normal.     Please call with questions or contact us using [email or web address].    Sincerely,        Electronically signed by Caitlyn Rees MD

## 2021-06-20 NOTE — LETTER
Letter by Caitlyn Rees MD at      Author: Caitlyn Rees MD Service: -- Author Type: --    Filed:  Encounter Date: 12/13/2019 Status: Signed         Sandy Spencer  [address]  [place] [address]         December 13, 2019         Dear Ms. Spencer,    Below are the results from your recent visit:    Resulted Orders   Reticulocytes   Result Value Ref Range    Retic Absolute Count 0.037 0.010 - 0.110 mill/uL    Retic Ct Pct 1.00 0.8 - 2.7 %   HM2(CBC w/o Differential)   Result Value Ref Range    WBC 5.6 4.0 - 11.0 thou/uL    RBC 3.74 (L) 3.80 - 5.40 mill/uL    Hemoglobin 12.6 12.0 - 16.0 g/dL    Hematocrit 36.2 35.0 - 47.0 %    MCV 97 80 - 100 fL    MCH 33.6 27.0 - 34.0 pg    MCHC 34.7 32.0 - 36.0 g/dL    RDW 11.7 11.0 - 14.5 %    Platelets 156 140 - 440 thou/uL    MPV 7.7 7.0 - 10.0 fL   Folate, Serum   Result Value Ref Range    Folate 8.2 >=3.5 ng/mL   Vitamin B12   Result Value Ref Range    Vitamin B-12 >2,000 (H) 213 - 816 pg/mL   Comprehensive Metabolic Panel   Result Value Ref Range    Sodium 134 (L) 136 - 145 mmol/L    Potassium 3.8 3.5 - 5.0 mmol/L    Chloride 106 98 - 107 mmol/L    CO2 18 (L) 22 - 31 mmol/L    Anion Gap, Calculation 10 5 - 18 mmol/L    Glucose 99 70 - 125 mg/dL    BUN 25 8 - 28 mg/dL    Creatinine 1.33 (H) 0.60 - 1.10 mg/dL    GFR MDRD Af Amer 47 (L) >60 mL/min/1.73m2    GFR MDRD Non Af Amer 39 (L) >60 mL/min/1.73m2    Bilirubin, Total 0.3 0.0 - 1.0 mg/dL    Calcium 8.5 8.5 - 10.5 mg/dL    Protein, Total 6.3 6.0 - 8.0 g/dL    Albumin 3.8 3.5 - 5.0 g/dL    Alkaline Phosphatase 48 45 - 120 U/L    AST 19 0 - 40 U/L    ALT 16 0 - 45 U/L    Narrative    Fasting Glucose reference range is 70-99 mg/dL per  American Diabetes Association (ADA) guidelines.   Iron and Transferrin Iron Binding Capacity   Result Value Ref Range    Iron 62 42 - 175 ug/dL    Transferrin 193 (L) 212 - 360 mg/dL    Transferrin Saturation, Calculated 26 20 - 50 %     Transferrin IBC, Calculated 242 (L) 313 - 563 ug/dL   Ferritin   Result Value Ref Range    Ferritin 179 (H) 10 - 130 ng/mL       Your red blood cell count is normal as well.     Please call with questions or contact us using [email or web address].    Sincerely,        Electronically signed by Caitlyn Rees MD

## 2021-06-20 NOTE — LETTER
Letter by Caitlyn Rees MD at      Author: Caitlyn Rees MD Service: -- Author Type: --    Filed:  Encounter Date: 9/24/2020 Status: (Other)         Sandy Spencer  [address] [place] [address]             September 24, 2020         Dear Ms. Spencer,    Below are the results from your recent visit:    Resulted Orders   HM2(CBC w/o Differential)   Result Value Ref Range    WBC 4.2 4.0 - 11.0 thou/uL    RBC 3.43 (L) 3.80 - 5.40 mill/uL    Hemoglobin 11.0 (L) 12.0 - 16.0 g/dL    Hematocrit 33.2 (L) 35.0 - 47.0 %    MCV 97 80 - 100 fL    MCH 32.0 27.0 - 34.0 pg    MCHC 33.1 32.0 - 36.0 g/dL    RDW 11.8 11.0 - 14.5 %    Platelets 169 140 - 440 thou/uL    MPV 7.3 7.0 - 10.0 fL   Comprehensive Metabolic Panel   Result Value Ref Range    Sodium 139 136 - 145 mmol/L    Potassium 4.2 3.5 - 5.0 mmol/L    Chloride 102 98 - 107 mmol/L    CO2 28 22 - 31 mmol/L    Anion Gap, Calculation 9 5 - 18 mmol/L    Glucose 73 70 - 125 mg/dL    BUN 21 8 - 28 mg/dL    Creatinine 1.33 (H) 0.60 - 1.10 mg/dL    GFR MDRD Af Amer 47 (L) >60 mL/min/1.73m2    GFR MDRD Non Af Amer 39 (L) >60 mL/min/1.73m2    Bilirubin, Total 0.7 0.0 - 1.0 mg/dL    Calcium 8.9 8.5 - 10.5 mg/dL    Protein, Total 6.2 6.0 - 8.0 g/dL    Albumin 3.9 3.5 - 5.0 g/dL    Alkaline Phosphatase 48 45 - 120 U/L    AST 30 0 - 40 U/L    ALT 23 0 - 45 U/L    Narrative    Fasting Glucose reference range is 70-99 mg/dL per  American Diabetes Association (ADA) guidelines.       Your results are all normal with the exception of your kidneys which are stable.     Please call with questions or contact us using MyChart.    Sincerely,        Electronically signed by Caitlyn Rees MD

## 2021-06-20 NOTE — PROGRESS NOTES
[facility] Daily Progress     Patient Name: Sandy Spencer  Date: 10/9/2018  Visit #: 16/24    Referral Diagnosis: Neck, Back Pain  Referring provider: Michael Ramirez DO  Visit Diagnosis:     ICD-10-CM    1. Diffuse myofascial pain syndrome M79.18    2. Chronic pain syndrome G89.4    3. Cervical radiculitis M54.12    4. Thoracic spine pain M54.6    5. Lumbar radiculopathy M54.16    6. Left-sided low back pain without sciatica, unspecified chronicity M54.5          Assessment:     Patient demonstrates understanding/independence with home program.  Patient is benefitting from skilled physical therapy and is making steady progress toward functional goals.  Patient is appropriate to continue with skilled physical therapy intervention, as indicated by initial plan of care.  She does seem to be doing better overall and is much less point tender with palpation.       Goals:  Pt. will demonstrate/verbalize independence in self-management of condition in : 12 weeks  Pt. will report decreased intensity, frequency of : Pain;in 12 weeks;Comment  Comment:: decrease pain to 2-7/10 with ADLs  Pt. will have improved quality of sleep: waking less times/night;getting 75-90% of required amount;in 12 weeks;Comment  Comment:: increase sleep to 4-6 hours   Pt. will be able to walk : 30 minutes;on even surfaces;with less difficulty;for household mobility;for community mobility;for exercise/recreation;in 12 weeks  Patient will stand : 30 minutes;with less difficultty;for home chores;in 12 weeks  Patient will sit: 30 minutes;for driving;for eating;for watching TV;with less difficultty;in 12 weeks  Patient will reach / maintain arm movement: overhead;for home chores;for dressing;with less difficulty;in 12 weeks  Patient will decrease : TIMA score;by _ points;for improved quality of function;for improved quality of life;in 12 weeks  by ___ points: 15        Plan / Patient Education:     Continue with initial plan of  "care.    Subjective:     Pain Ratin       She woke up with \"shingles\" all over her sciatic nerve. She did go into the MD.   She also saw Dr. Ramirez today. She did get some treatment from him which is also helpful.       Objective:      fascial tensions    Treatment Today     TREATMENT MINUTES COMMENTS   Evaluation     Self-care/ Home management     Manual therapy 25 Fascial release using Strain-Counterstrain of B cranial dura-N, R post axil row-LV, R lumbopelvic-LV   Neuromuscular Re-education 15 Recip inhib of LV, neural fascia   Therapeutic Activity     Therapeutic Exercises 15 AA/PROM to cranium, pelvis, L-T spine, C-spine   Gait training     Modality__________________                Total 55    Blank areas are intentional and mean the treatment did not include these items.       Rudolph Molina  10/9/2018    "
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
## 2021-06-20 NOTE — PROGRESS NOTES
[facility] Daily Progress     Patient Name: Sandy Spencer  Date: 9/11/2018  Visit #: 9/12    Referral Diagnosis: Neck, Back pain  Referring provider: Michael Ramirez DO  Visit Diagnosis:     ICD-10-CM    1. Diffuse myofascial pain syndrome M79.1    2. Chronic pain syndrome G89.4    3. Thoracic spine pain M54.6    4. Cervical radiculitis M54.12    5. Lumbar radiculopathy M54.16    6. Complex regional pain syndrome type 1 of both lower extremities G90.523          Assessment:     Patient demonstrates understanding/independence with home program.  Patient is benefitting from skilled physical therapy and is making steady progress toward functional goals.  Patient is appropriate to continue with skilled physical therapy intervention, as indicated by initial plan of care.  She did have very good release of fascial tensions. She is able to tolerate palpation much better than at initial evaluation.      Goal Status:  Pt. will demonstrate/verbalize independence in self-management of condition in : 12 weeks  Pt. will report decreased intensity, frequency of : Pain;in 12 weeks;Comment  Comment:: decrease pain to 2-7/10 with ADLs  Pt. will have improved quality of sleep: waking less times/night;getting 75-90% of required amount;in 12 weeks;Comment  Comment:: increase sleep to 4-6 hours  Pt. will be able to walk : 30 minutes;on even surfaces;with less difficulty;for household mobility;for community mobility;for exercise/recreation;in 12 weeks  Patient will stand : 30 minutes;with less difficultty;for home chores;in 12 weeks  Patient will sit: 30 minutes;for driving;for eating;for watching TV;with less difficultty;in 12 weeks  Patient will reach / maintain arm movement: overhead;for home chores;for dressing;with less difficulty;in 12 weeks  Patient will decrease : TIMA score;by _ points;for improved quality of function;for improved quality of life;in 12 weeks  by ___ points: 15    Plan / Patient Education:      Continue with initial plan of care.    Subjective:     Pain Ratin  She saw Dr. Ramirez and she did get some treatment from him. She was able to tolerate that well and it did make a difference. It seemed to last about a half hour. The pain is in her waist.   Overall she does feel like things are progressing. She really likes the feel of having someone else work on her.     Objective:     LV fascial tensions.     Treatment Today   2018   TREATMENT MINUTES COMMENTS   Evaluation     Self-care/ Home management     Manual therapy 25 Fascial release using Strain-Counterstrain of BLE/lumbopelivc-LV, INFRA-LV   Neuromuscular Re-education 15 Recip inhib of LV fascia   Therapeutic Activity     Therapeutic Exercises 15 AA/PROM to BLE, pelvis, L-spine, cranium   Gait training     Modality__________________                Total 55    Blank areas are intentional and mean the treatment did not include these items.       Rudolph Molina  2018

## 2021-06-20 NOTE — LETTER
Letter by Caitlyn Rees MD at      Author: Caitlyn Rees MD Service: -- Author Type: --    Filed:  Encounter Date: 12/13/2019 Status: Signed          Sandy Spencer  [address]  [place] [address]             December 13, 2019         Dear Ms. Spencer,    Below are the results from your recent visit:    Resulted Orders   Folate, Serum   Result Value Ref Range    Folate 8.2 >=3.5 ng/mL   Vitamin B12   Result Value Ref Range    Vitamin B-12 >2,000 (H) 213 - 816 pg/mL   Comprehensive Metabolic Panel   Result Value Ref Range    Sodium 134 (L) 136 - 145 mmol/L    Potassium 3.8 3.5 - 5.0 mmol/L    Chloride 106 98 - 107 mmol/L    CO2 18 (L) 22 - 31 mmol/L    Anion Gap, Calculation 10 5 - 18 mmol/L    Glucose 99 70 - 125 mg/dL    BUN 25 8 - 28 mg/dL    Creatinine 1.33 (H) 0.60 - 1.10 mg/dL    GFR MDRD Af Amer 47 (L) >60 mL/min/1.73m2    GFR MDRD Non Af Amer 39 (L) >60 mL/min/1.73m2    Bilirubin, Total 0.3 0.0 - 1.0 mg/dL    Calcium 8.5 8.5 - 10.5 mg/dL    Protein, Total 6.3 6.0 - 8.0 g/dL    Albumin 3.8 3.5 - 5.0 g/dL    Alkaline Phosphatase 48 45 - 120 U/L    AST 19 0 - 40 U/L    ALT 16 0 - 45 U/L    Narrative    Fasting Glucose reference range is 70-99 mg/dL per  American Diabetes Association (ADA) guidelines.   Iron and Transferrin Iron Binding Capacity   Result Value Ref Range    Iron 62 42 - 175 ug/dL    Transferrin 193 (L) 212 - 360 mg/dL    Transferrin Saturation, Calculated 26 20 - 50 %    Transferrin IBC, Calculated 242 (L) 313 - 563 ug/dL   Ferritin   Result Value Ref Range    Ferritin 179 (H) 10 - 130 ng/mL       Your iron stores are better, your kidneys are stable as well. Your B12 is normal also.     Please call with questions or contact us using [facility].    Sincerely,        Electronically signed by Caitlyn Rees MD

## 2021-06-20 NOTE — PROGRESS NOTES
[facility] Daily Progress     Patient Name: Sandy Spencer  Date: 2018  Visit #:    Referral Diagnosis: LBP   LE Pain  Referring provider: Michael Ramirez DO  Visit Diagnosis:   No diagnosis found.      Assessment:     Patient demonstrates understanding/independence with home program.  Patient is benefitting from skilled physical therapy and is making steady progress toward functional goals.  Patient is appropriate to continue with skilled physical therapy intervention, as indicated by initial plan of care.  The HA was better post treatment but was not completely gone. Suture work was good and this should help to depressurize her head as well.     Goal Status:  Pt. will demonstrate/verbalize independence in self-management of condition in : 12 weeks  Pt. will report decreased intensity, frequency of : Pain;in 12 weeks;Comment  Comment:: decrease pain to 2-7/10 with ADLs  Pt. will have improved quality of sleep: waking less times/night;getting 75-90% of required amount;in 12 weeks;Comment  Comment:: increase sleep to 4-6 hours  Pt. will be able to walk : 30 minutes;on even surfaces;with less difficulty;for household mobility;for community mobility;for exercise/recreation;in 12 weeks  Patient will stand : 30 minutes;with less difficultty;for home chores;in 12 weeks  Patient will sit: 30 minutes;for driving;for eating;for watching TV;with less difficultty;in 12 weeks  Patient will reach / maintain arm movement: overhead;for home chores;for dressing;with less difficulty;in 12 weeks  Patient will decrease : TIMA score;by _ points;for improved quality of function;for improved quality of life;in 12 weeks  by ___ points: 15    Plan / Patient Education:     Continue with initial plan of care.    Subjective:     Pain Ratin   She has a HA today. She is feeling like the weather has something to do with it.   She has found that she has a hard time with placing her leads for the TENS unit and doesn't  have anyone to help her with it.   Overall, she does feel like she all of her issues are being addressed which is good. She feels like we are keeping the pain at a lower level.     Objective:     MS, LV fascial tensions.     Treatment Today     TREATMENT MINUTES COMMENTS   Evaluation     Self-care/ Home management     Manual therapy 25 Fascial release using Strain-Counterstrain of B thoracic F/rib I (P)-MS, B cranial/suture-LV   Neuromuscular Re-education 15 Recip inhib of LV, MS fascia   Therapeutic Activity     Therapeutic Exercises 15 AA/PROM to ribs, T-spine, C-spine, cranium   Gait training     Modality___ UP_______________                Total 55    Blank areas are intentional and mean the treatment did not include these items.       Rudolph Molina  8/28/2018
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
## 2021-06-20 NOTE — LETTER
Letter by Dante Can RN at      Author: Dante Can RN Service: -- Author Type: --    Filed:  Encounter Date: 5/11/2020 Status: (Other)       5/11/2020        Sandy Spencer  [address]  [facility] [address]    This letter provides a written record that you were tested for COVID-19 on 5/10/20.     Your result was positive.     This means that we found the virus that causes COVID-19 in your sample.    How can I protect others?    For safety, its very important to follow these rules.    First, stay home and away from others (self-isolate) until:      Youve had no fever--and no medicine that reduces fever--for 3 full days (72 hours). And?     Your other symptoms have gotten better. For example, your cough or breathing has improved. And?    At least 10 days have passed since your symptoms started.    During this time:      Stay in your own room (and use your own bathroom), if you can.    Stay away from others in your home. No hugging, kissing or shaking hands.    Dont let anyone visit.    Dont go to work, school or anywhere else.     Clean high touch surfaces often (doorknobs, counters, handles, etc.). Use a household cleaning spray or wipes.    Cover your mouth and nose with a mask, tissue or washcloth to avoid spreading germs.    Wash your hands and face often with soap and water.    You should not go back to work until you meet the guidelines above for ending your home isolation. You should meet these along with any other guidelines that your employer has.    Employers: This document serves as formal notice of your employees medical guidelines for going back to work. They must meet the above guidelines before going back to work in person.    How can I take care of myself?    1. Get lots of rest. Drink extra fluids (unless a doctor has told you not to).    2. Take Tylenol (acetaminophen) for fever or pain. If you have liver or kidney problems, ask your family doctor if its okay  to take Tylenol.     Take either:     650 mg (two 325 mg pills) every 4 to 6 hours, or?    1,000 mg (two 500 mg pills) every 8 hours as needed.     Note: Dont take more than 3,000 mg in one day. Acetaminophen is found in many medicines (both prescribed and over-the-counter medicines). Read all labels to be sure you dont take too much.  For children, check the Tylenol bottle for the right dose. The dose is based on the julia age or weight.    1. If you have other health problems (like cancer, heart failure, an organ transplant or severe kidney disease): Call your specialty clinic if you dont feel better in the next 2 days.    2. Know when to call 911: If your breathing is so bad that it keeps you from doing normal activities, call 911 or go to the emergency room. Tell them that youve been staying home and may have COVID-19.    3. Sign up for [employer]. We know its scary to hear that you have COVID-19. We want to track your symptoms to make sure youre okay over the next 2 weeks. Please look for an email from [employer]--this is a free, online program that well use to keep in touch. To sign up, follow the link in the email. Learn more at [email or web address].    4. Interested is participating in research? Visit the link below to view current clinical trials that apply to your situation:  [email or web address]    Where can I get more information?    To learn the [facility] guidelines for staying home, please visit the [place] Department of Health website at https://www.health.state.[place]/diseases/coronavirus/basics.html.    To learn more about COVID-19 and how to care for yourself at home, please visit the CDC website at https://www.cdc.gov/coronavirus/2019-ncov/about/steps-when-sick.html.    For more options for care at [facility], please visit our website at [email or web address]

## 2021-06-20 NOTE — PROGRESS NOTES
[facility] Daily Progress     Patient Name: Sandy Spencer  Date: 8/21/2018  Visit #:8/12    Referral Diagnosis: LBP   LE Pain  Referring provider: Michael Ramirez DO  Visit Diagnosis:     ICD-10-CM    1. Diffuse myofascial pain syndrome M79.1    2. Chronic pain syndrome G89.4    3. Thoracic spine pain M54.6    4. Cervical radiculitis M54.12    5. Lumbar radiculopathy M54.16          Assessment:     Patient demonstrates understanding/independence with home program.  Patient is benefitting from skilled physical therapy and is making steady progress toward functional goals.  Patient is appropriate to continue with skilled physical therapy intervention, as indicated by initial plan of care.  There was good release of fascial tensions treated today. She was much more tolerant to palpation and treatment than she has been previously which is a good sign of calming her sympathetic drive.     Goal Status:  Pt. will demonstrate/verbalize independence in self-management of condition in : 12 weeks  Pt. will report decreased intensity, frequency of : Pain;in 12 weeks;Comment  Comment:: decrease pain to 2-7/10 with ADLs  Pt. will have improved quality of sleep: waking less times/night;getting 75-90% of required amount;in 12 weeks;Comment  Comment:: increase sleep to 4-6 hours  Pt. will be able to walk : 30 minutes;on even surfaces;with less difficulty;for household mobility;for community mobility;for exercise/recreation;in 12 weeks  Patient will stand : 30 minutes;with less difficultty;for home chores;in 12 weeks  Patient will sit: 30 minutes;for driving;for eating;for watching TV;with less difficultty;in 12 weeks  Patient will reach / maintain arm movement: overhead;for home chores;for dressing;with less difficulty;in 12 weeks  Patient will decrease : TIMA score;by _ points;for improved quality of function;for improved quality of life;in 12 weeks  by ___ points: 15    Plan / Patient Education:     Continue with  initial plan of care.    Subjective:     Pain Ratin   She slept for 5 hours or so which was really awesome.   She does feel like there is a shelf in her lower back/sacrum that is painful.     Objective:     Art fascial tensions.     Treatment Today     TREATMENT MINUTES COMMENTS   Evaluation     Self-care/ Home management     Manual therapy 30 Fascial release using Strain-Counterstrain of BLE/lumbopelivc-Art   Neuromuscular Re-education     Therapeutic Activity     Therapeutic Exercises     Gait training     Modality___ UP_______________                Total 30    Blank areas are intentional and mean the treatment did not include these items.       Rudolph Molina  2018

## 2021-06-20 NOTE — LETTER
Letter by Caitlyn Rees MD at      Author: Caitlyn Rees MD Service: -- Author Type: --    Filed:  Encounter Date: 10/8/2020 Status: (Other)         Sandy Spencer  [address] [place] [address]             October 8, 2020         Dear Ms. Spencer,    Below are the results from your recent visit:    Resulted Orders   Comprehensive Metabolic Panel   Result Value Ref Range    Sodium 137 136 - 145 mmol/L    Potassium 4.1 3.5 - 5.0 mmol/L    Chloride 102 98 - 107 mmol/L    CO2 28 22 - 31 mmol/L    Anion Gap, Calculation 7 5 - 18 mmol/L    Glucose 85 70 - 125 mg/dL    BUN 28 8 - 28 mg/dL    Creatinine 1.57 (H) 0.60 - 1.10 mg/dL    GFR MDRD Af Amer 39 (L) >60 mL/min/1.73m2    GFR MDRD Non Af Amer 32 (L) >60 mL/min/1.73m2    Bilirubin, Total 0.4 0.0 - 1.0 mg/dL    Calcium 8.3 (L) 8.5 - 10.5 mg/dL    Protein, Total 6.1 6.0 - 8.0 g/dL    Albumin 3.8 3.5 - 5.0 g/dL    Alkaline Phosphatase 47 45 - 120 U/L    AST 34 0 - 40 U/L    ALT 22 0 - 45 U/L    Narrative    Fasting Glucose reference range is 70-99 mg/dL per  American Diabetes Association (ADA) guidelines.   Lipid [place] FASTING   Result Value Ref Range    Cholesterol 143 <=199 mg/dL    Triglycerides 47 <=149 mg/dL    HDL Cholesterol 89 >=50 mg/dL    LDL Calculated 45 <=129 mg/dL    Patient Fasting > 8hrs? Yes        Your kidneys are fairly stable. I know you've seen urology before, but I don't think you've seen a nephrologist (kidney doctor). We should maybe consider this. Let me know what you think about this.     Your cholesterol is quite good with what you are on.     Your calcium is down just a tad. Please make sure you are getting enough in your diet.     Please call with questions or contact us using [employer].    Sincerely,        Electronically signed by Caitlyn Rees MD

## 2021-06-20 NOTE — LETTER
Letter by Caitlyn Rees MD at      Author: Caitlyn Rees MD Service: -- Author Type: --    Filed:  Encounter Date: 7/2/2020 Status: (Other)         Sandy Spencer  [address]  [place] [facility] [address]             July 2, 2020         Dear Ms. Spencer,    Below are the results from your recent visit:    Resulted Orders   Urinalysis Macroscopic   Result Value Ref Range    Color, UA Yellow Colorless, Yellow, Straw, Light Yellow    Clarity, UA Clear Clear    Glucose, UA Negative Negative    Bilirubin, UA Negative Negative    Ketones, UA Negative Negative    Specific Gravity, UA 1.020 1.005 - 1.030    Blood, UA Moderate (!) Negative    pH, UA 7.0 5.0 - 8.0    Protein, UA Negative Negative mg/dL    Urobilinogen, UA 0.2 E.U./dL 0.2 E.U./dL, 1.0 E.U./dL    Nitrite, UA Negative Negative    Leukocytes, UA Negative Negative   Culture, Urine   Result Value Ref Range    Culture No Growth    Basic Metabolic Panel   Result Value Ref Range    Sodium 136 136 - 145 mmol/L    Potassium 4.1 3.5 - 5.0 mmol/L    Chloride 103 98 - 107 mmol/L    CO2 22 22 - 31 mmol/L    Anion Gap, Calculation 11 5 - 18 mmol/L    Glucose 111 70 - 125 mg/dL    Calcium 9.5 8.5 - 10.5 mg/dL    BUN 28 8 - 28 mg/dL    Creatinine 1.35 (H) 0.60 - 1.10 mg/dL    GFR MDRD Af Amer 46 (L) >60 mL/min/1.73m2    GFR MDRD Non Af Amer 38 (L) >60 mL/min/1.73m2    Narrative    Fasting Glucose reference range is 70-99 mg/dL per  American Diabetes Association (ADA) guidelines.   Thyroid Stimulating Hormone (TSH)   Result Value Ref Range    TSH 0.61 0.30 - 5.00 uIU/mL   T4, Free   Result Value Ref Range    Free T4 1.0 0.7 - 1.8 ng/dL       As discussed, your kidneys look much better. You did not have a bladder infection and your thyroid is fine.     Please call with questions or contact us using [employer].    Sincerely,        Electronically signed by Caitlyn Rees MD

## 2021-06-20 NOTE — PROGRESS NOTES
"Chief Complaint   Patient presents with     Diarrhea     Pt c/o excessive diarrhea due to abx for the past 2 months and has excellerated due to colonoscopy prep 8/14     Results       HPI: Very interesting 77-year-old female with many medical issues, presents today with \"diarrhea\" for the past 2 months.  She states that she was given antibiotics about 2 months ago by Dr. Rees for sinusitis and since that time has had diarrhea.  Interestingly, old records were reviewed from 10 days ago showing normal colonoscopy with normal colon biopsies and no evidence of any pathology.  Several small ulcers were noted but none of them showing colitis or inflammation.  Old records show patient has had elevated lipase in the past as well.    ROS: No fever.  No melena hematochezia.  No vomiting reported.    SH:    reports that she quit smoking about 18 years ago. She has a 20.00 pack-year smoking history. She has never used smokeless tobacco. She reports that she does not drink alcohol or use illicit drugs.      FH: The Patient's family history includes Aortic aneurysm in her mother; Heart disease in her father and mother; Kidney disease in her father and mother; Stroke in her father.     Meds:  Sandy has a current medication list which includes the following prescription(s): acetylcysteine (bulk), aspirin, atorvastatin, azelastine, cholecalciferol (vitamin d3), diphenhydramine, eszopiclone, fentanyl, fentanyl, levothyroxine, naloxone, fish oil-omega-3 fatty acids, progesterone, psyllium, rosuvastatin, spironolactone, sumatriptan, topiramate, and trazodone, and the following Facility-Administered Medications: denosumab.    O:  /62  Pulse 81  Temp 98.6  F (37  C)  Resp 16  Ht 5' 2\" (1.575 m)  Wt 151 lb 6 oz (68.7 kg)  SpO2 98%  BMI 27.69 kg/m2     Lungs--Clear to Auscultation  Heart--Regular rate and rhythm  Abdomen--Soft, non-tender, non-distended  Skin-Pink and dry     A/P:   1. Diarrhea  -The patient appears to have " many issues on her mind, none of which appear to be medically acute.  Because of her past: History, resection, as well as long history of pancreatitis I recommended that she follow-up with GI and she told me that the GI physicians refused to see her.  This is not documented.  I recommend referral to the [facility] if [place] [facility] is unable to help her, and will get tests as noted below.  Discussed with her primary care physician Dr. Rees.  - C. difficile Toxigenic by PCR; Future  - Ova and Parasite, Stool; Future  - Lipase  - Basic Metabolic Panel

## 2021-06-20 NOTE — PROGRESS NOTES
Mental Health Visit Note    9/26/2018    Start time: 1300    Stop Time: 1350   Session # 13    Session Type: Patient is presenting for an Individual session.    Sandy Spencer is a 78 y.o. female is being seen today for    Chief Complaint   Patient presents with     Depression/anxiety   .     New symptoms or complaints: None    Functional Impairment:   Personal: 4  Family: 4  Work: 4  Social:4    Clinical assessment of mental status: Patient presented alert and oriented x3.  She was well-groomed.  Her attire was appropriate.  Patient was cooperative.  Patient motor actively was normal and eye contact appropriate.  Patients mood was anxious and affect congruent.  Patient s speech and language was normal.  Patient attention and thought process normal.  Concentration was appropriate.  Patient denied delusions or hallucinations. Patient denied suicidal or homicidal ideation.  Patient denied any long or short term memory problems. No evidence of impairment in judgment.  Patient s estimated intelligence is average to above-average.  She has insight and fund of knowledge was adequate.        Suicidal/Homicidal Ideation present: None Reported This Session    Patient's impression of their current status: Pt reports ongoing symptoms of depression, anxiety & chronic pain that impact daily functioning.     Therapist impression of patients current state: Processed current stressors impacting mood & physical functioning.  Discussed family issues, communication struggles and self-care techniques.  Processed importance of boundaries and not taking responsibility of other people.  Worked on emotional regulations skills.      Type of psychotherapeutic technique provided: Insight oriented, Client centered, Solution-focused, CBT and DBT    Progress toward short term goals:Progress as expected, self-care, boundaries, coping skills, stress management    Review of long term goals: reduce symptoms of depression/anxiety    Diagnosis:   1.  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 Generalized anxiety disorder    3. Chronic pain syndrome        Plan and Follow up: Practice self-care, communication strategies and setting healthy boundaries  FOllow up with Brinda in 2 weeks  Follow up with all providers as scheduled      Discharge Criteria/Planning: Patient will continue with follow-up until therapy can be discontinued without return of signs and symptoms.    Ricarda Burns 10/2/2018

## 2021-06-21 NOTE — PROGRESS NOTES
"Kerry reviews significant frustrations.  She describes the pharmacy demanded she pay for Narcan out-of-pocket or they would not give her her fentanyl patch.  She describes being forgetting to change the patch for a couple of days with increased pain.    She is upset that her primary care provider made a referral to a psychiatrist.  She acknowledges that she was telling the provider that she was significantly deteriorating but feels she has too many doctors and places to go.  That she does not want to take any other \"psychiatric\" medications, and feels that that is not the focus.    She is working with GI doctors with endoscopy, has other procedures.  There is concerned she may have chronic pancreatitis or ulcers.  She has 2 \"blowing the tubes\" sounds as they are looking for H. pylori or perhaps small bowel overgrowth.      She has had a recurrence of shingles down her left sciatica, describes the fifth time.  She did have a T12 injection with Dr. Mittal which helps somewhat and was told that they could possibly choose another level.    Continues with RSD symptoms down her leg, did have some toenails trimmed to help cut back.  Continues with headaches over the last 2 weeks.    Review she is not sleeping well.  The children continue upstairs making noise.  She believes the neighbors 40-year-old son upstairs sleep in there, hears the pull-out bed dropped on  the ceiling.  She had been told by the landlord that they were going to move or that she could possibly move but the landlord has been avoiding her.  With the noise she is only sleeping a few hours at night.    Did give valacyclovir yesterday to start for the shingles.    When last seen we did use prazosin 2 mg at bedtime.  She has not tried a higher dose.    She reviews her distress being told by GI doctors to get a bracelet with opioids noted.    Reviewing the record she has been seen by Dr. Ramirez in the spine center addressing somatic dysfunction and " commented on the shingles.      Current Outpatient Prescriptions:      acetylcysteine, bulk, Powd, 600 mg capsules, take orally three times a day, Disp: 42 Bottle, Rfl: 2     aspirin 81 MG EC tablet, Take 81 mg by mouth daily., Disp: , Rfl:      azelastine (ASTEPRO) 0.15 % (205.5 mcg) Spry nasal spray, 1 spray by Left Nare route 2 (two) times a day as needed., Disp: , Rfl:      cholecalciferol, vitamin D3, 5,000 unit Tab, Take 1 tablet by mouth daily., Disp: , Rfl:      diphenhydrAMINE (BENADRYL) 25 mg capsule, Take 25 mg by mouth every 6 (six) hours as needed. , Disp: , Rfl:      lidocaine (XYLOCAINE) 5 % ointment, Apply to lumbar back area three times a day PRN pain, Disp: 35.44 g, Rfl: 0     loperamide (IMODIUM A-D) 2 mg tablet, Take 1 tablet (2 mg total) by mouth 4 (four) times a day as needed for diarrhea., Disp: 60 tablet, Rfl: 1     naloxone (NARCAN) 4 mg/actuation nasal spray, 1 spray (4 mg dose) into one nostril for opioid reversal. Call 911. May repeat if no response in 3 minutes., Disp: 1 Box, Rfl: 0     OMEGA-3/DHA/EPA/FISH OIL (FISH OIL-OMEGA-3 FATTY ACIDS) 300-1,000 mg capsule, Take 1.2 g by mouth 2 (two) times a day., Disp: , Rfl:      omeprazole (PRILOSEC) 40 MG capsule, Take 1 capsule (40 mg total) by mouth daily., Disp: 30 capsule, Rfl: 3     prazosin (MINIPRESS) 1 MG capsule, One bedtime for 5 nights, may increase to two bedtime for five nights, if needed three bedtime, Disp: 60 capsule, Rfl: 2     psyllium (METAMUCIL) 3.4 gram packet, Take 1 packet by mouth 2 (two) times a day., Disp: , Rfl: 0     rosuvastatin (CRESTOR) 40 MG tablet, Take 1 tablet (40 mg total) by mouth daily., Disp: 90 tablet, Rfl: 3     spironolactone (ALDACTONE) 25 MG tablet, Take 1 tablet (25 mg total) by mouth daily., Disp: 90 tablet, Rfl: 1     SUMAtriptan (IMITREX) 50 MG tablet, Take 1 tablet (50 mg total) by mouth every 2 (two) hours as needed for migraine., Disp: 27 tablet, Rfl: 1     topiramate (TOPAMAX) 50 MG tablet,  "Take 1 tablet (50 mg total) by mouth 2 (two) times a day., Disp: 180 tablet, Rfl: 1     traZODone (DESYREL) 100 MG tablet, TAKE 2 TO 4 TABLETS(200  MG) BY MOUTH AT BEDTIME, Disp: 360 tablet, Rfl: 1     valACYclovir (VALTREX) 500 MG tablet, Take 1 tablet (500 mg total) by mouth 3 (three) times a day for 7 days., Disp: 21 tablet, Rfl: 0     [START ON 10/19/2018] fentaNYL (DURAGESIC) 50 mcg/hr, Place 1 patch on the skin every third day., Disp: 10 patch, Rfl: 0    Current Facility-Administered Medications:      denosumab 60 mg (PROLIA 60 mg/ml), 60 mg, Subcutaneous, Q6 Months, Andrei Olivera MD, 60 mg at 08/13/18 1126  Blood pressure 101/68, pulse 93, resp. rate 16, height 5' 2\" (1.575 m), weight 142 lb 6.4 oz (64.6 kg), not currently breastfeeding.    Pain score is described as 10.  She is alert with a clear sensorium thought process is logical.  She is quite distressed and dysphoric.  Not having the pressured speech that had been noted earlier this year.    Impression: Chronic pain is included lower extremity chronic regional pain syndrome, migraine headaches, abdominal pain, with concern for relationship to pancreatitis, and possibly referred pain into her back, recurrence of shingles down her right sciatic.    She has consistently describe more recently stressors that she is moved to the apartment with children making noise upstairs and apparently not allowed in this facility to stay this long.    PLAN: I did provide another letter to give to her landlord indicating that the stress and sleep deprivation is adversely affecting her medical condition.    We will increase the prazosin to 3 mg at night to see if this will help with sleep and anxiety, with caution for orthostasis, after 1 week if tolerating and still target symptoms 4 mg at bedtime.    We discussed with her GI symptoms working with the pharmacist at [facility] with an increased interest in functional GI medicine with a variety of " supplements and is spent times working with patients using supplements such as D GL.    I checkout she will schedule again with Dr. Mittal to consider lower level block to see if this will help with some of her symptoms.    I did indicate that it appears going to see another mental health provider this point may be adding to her confusion distress, though supported response from her primary care provider to address her needs.  Will call questions about above.    We will continue the fentanyl 50 mcg as she complains of pain, though is apprehensive about a higher dose that she feels that appears her independent driving.    Time spent 25 minutes face-to-face more than 50% count about above condition coronation treatment plan
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
## 2021-06-21 NOTE — PROGRESS NOTES
[facility] Daily Progress     Patient Name: Sandy Spencer  Date: 10/22/2018  Visit #:    Referral Diagnosis: L LE pain  Possibly due to shingles  Referring provider: Michael Ramirez DO  Visit Diagnosis:     ICD-10-CM    1. Chronic pain syndrome G89.4    2. Cervical radiculitis M54.12    3. Thoracic spine pain M54.6    4. Left-sided low back pain without sciatica, unspecified chronicity M54.5    5. Left lower quadrant pain R10.32    6. Complex regional pain syndrome type 1 of both lower extremities G90.523    7. 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 M48.07          Assessment:     Patient demonstrates understanding/independence with home program.  Patient is benefitting from skilled physical therapy and is making steady progress toward functional goals.  Patient is appropriate to continue with skilled physical therapy intervention, as indicated by initial plan of care.    Goal Status:  Pt. will demonstrate/verbalize independence in self-management of condition in : Progressing toward  Pt. will report decreased intensity, frequency of : Progressing toward  Pt. will have improved quality of sleep: Partially met  Pt. will be able to walk : Progressing toward  Patient will stand : Partially met  Patient will sit: Met  Patient will reach / maintain arm movement: Partially met  Patient will decrease : Progressing toward    Plan / Patient Education:     Continue with initial plan of care.    Subjective:     Pain Ratin-8/10  Burning sensation        Objective:     Head aches every day   Sphenoid restriction,   + Scan Visceral Pleura,   Ant Neuro  Pelvis,       Treatment Today     TREATMENT MINUTES COMMENTS   Evaluation     Self-care/ Home management     Manual therapy 60 MFR to the head and neck.   SCS to Pre Ganglionics T 4,5-N.   SCS to L EPIS1-S1,   SCIL5-N,  S1-N,   PFEM-LV,   OBT-N,   MFC-A,   IEPI-A,   MFC-A,  SCS to Lower Extremity Nerve Chain PLAN1-4-N L   SCS to Spleen,   Vagus  nerve to spleen,     Pain to pt's L lower quadrant ant, post and L LE decreased 7-8/10 to 2/10.   Pt more comfortable and able to sit, transfer and walk with greater ease   Neuromuscular Re-education     Therapeutic Activity     Therapeutic Exercises     Gait training     Modality__________________                Total 60    Blank areas are intentional and mean the treatment did not include these items.       Yogi Velazquez  10/22/2018

## 2021-06-21 NOTE — PROGRESS NOTES
ASSESSMENT/PLAN:       1. Diarrhea  -I do think her x-rays show some constipation as well.  She will add in Metamucil on a daily basis for now and if things are not getting better twice daily.  I provided her with some Lomotil to try for the diarrhea especially at nighttime instead of the Imodium and I will have her follow-up here in the next month.  Additionally I am checking basic labs I recommended considering a course of Flagyl if things are not improving over the next week or so.  - Comprehensive Metabolic Panel  - HM2(CBC w/o Differential)  - XR Abdomen Flat and Upright; Future  - diphenoxylate-atropine (LOMOTIL) 2.5-0.025 mg per tablet; Take 1 tablet by mouth 4 (four) times a day as needed for diarrhea.  Dispense: 30 tablet; Refill: 1    2. LUQ abdominal pain  -Doing well with the omeprazole, renewing today.  Follow-up in 6 months.  - omeprazole (PRILOSEC) 40 MG capsule; Take 1 capsule (40 mg total) by mouth daily.  Dispense: 90 capsule; Refill: 1    3. Acquired hypothyroidism  -Given her diarrhea I am checking a TSH again today.  Renewing medications and have her follow-up here and 6-12 months.  - levothyroxine (LEVO-T) 50 MCG tablet; Take 1 tablet (50 mcg total) by mouth Daily at 6:00 am.  Dispense: 90 tablet; Refill: 3  - Thyroid Stimulating Hormone (TSH)    4. Cluster headaches  -Generally doing well unfortunately she is found out that her Topamax may cost her $1100 in the next year.  Renewing today for now, and we may need to consider alternate therapy like next year depending on the actual cost once her insurance changes.  - topiramate (TOPAMAX) 50 MG tablet; Take 1 tablet (50 mg total) by mouth 2 (two) times a day.  Dispense: 180 tablet; Refill: 1    5. Anxiety and depression  -She does feel fairly stable at this time.  Her pain physician has stated that he feels comfortable managing her mental health.  She does see a therapist through their clinic as well.    6. Essential hypertension  -Blood pressure  is excellent today.  Continue with her current medications, labs are updated today and plan on following up in 6 months.  - spironolactone (ALDACTONE) 25 MG tablet; Take 0.5 tablets (12.5 mg total) by mouth daily.  Dispense: 45 tablet; Refill: 1    7.  Fall  -Exam is relatively unremarkable without any obvious point tender spots.  For now I recommended continuing to monitor and if things are not improving she should let me know and we can consider repeat x-rays at that time.    Caitlyn Rees MD      PROGRESS NOTE   11/20/2018    SUBJECTIVE:  Sandy Spencer is a 78 y.o. female  who presents for follow up on her depression.     She does think that she hurt herself when she fell. She fell at the [employer] and went in to the urgency room. She does still have pain in her left elbow and her left foot from then. She felt like she had fire in her foot after this, she was having electricity going out of her big toe all the time at that time. She does wonder about side effects with prolia.     She did not refill the Valtrex at this time. She did not fill this.     She does note that her Topiramate is going to be 1100 next year.     She does still have issues getting scheduled with the [facility] for her endoscopic ultrasound.     She has lost control of her bowel and bladder. She will wake up and have lost control of both. She will take the loperamide at least 4 times today and is still having diarrhea with this. She does not get a warning in the middle of the night that she was going to go. She had this before she went to the hospital a year ago and was in there for 5-6 days at that time. Started again over the weekend. She has had at least 10 times a day with diarrhea. She does have diarrhea all the time bt it has become more volume now. She has had two formed stools in the last month. She does try to stay away from herbs, italian seasonings. She has had no fevers, no blood in the stool.       Chief  Complaint   Patient presents with     Depression     Patient is here today for her 3 week follow up in regards to anxiety and depression.      Medication Management     Patient is in need of medication refills. She also would like to discuss the Loperamide prescription. She takes 4 to 6 tabs a day and is still having diarrhea.     Fall     Patient fell about 4 to 5 weeks ago, she landed on her knees, left hip and left elbow. She believes she also injured her left foot. Patient is still having pain in her left elbow and pain and swelling in her left foot. She feels that electricity shoots out of the big toe of her left foot.      Diarrhea         Patient Active Problem List   Diagnosis     Chronic kidney disease (CKD) stage G3a/A1, moderately decreased glomerular filtration rate (GFR) between 45-59 mL/min/1.73 square meter and albuminuria creatinine ratio less than 30 mg/g (H)     Focal glomerular sclerosis     Anxiety and depression     RSD lower limb, bilateral     ADD (attention deficit disorder)     RSD upper limb, right     Other specified hypothyroidism     Osteopenia     Major depression     Hypertension     Insomnia     Mixed hyperlipidemia     Right rotator cuff tear     Cluster headaches     Lumbar radiculopathy     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     Reflex sympathetic dystrophy     Acute encephalopathy     Temporomandibular joint-pain-dysfunction syndrome (TMJ)     Pancreatitis     Localized swelling of lower leg     Medical cannabis use     Acute right-sided low back pain without sciatica     Acute exacerbation of chronic low back pain     Acquired hypothyroidism     Chronic pain syndrome     Non morbid obesity due to excess calories     Snoring     Inadequate pain control     Diarrhea     Abdominal pain     Dermatochalasis of left eyelid     Bilateral carotid artery stenosis     Coronary artery disease involving native coronary artery of native heart without angina pectoris     Sinus  bradycardia       Current Outpatient Medications   Medication Sig Dispense Refill     acetylcysteine, bulk, Powd 600 mg capsules, take orally three times a day 42 Bottle 2     aspirin 81 MG EC tablet Take 81 mg by mouth daily.       aspirin-calcium carbonate 81 mg-300 mg calcium(777 mg) Tab Take 81 mg by mouth.       atenolol (TENORMIN) 100 MG tablet atenolol 100 mg tablet   Take 1 tablet every day by oral route.       azelastine (ASTEPRO) 0.15 % (205.5 mcg) Spry nasal spray 1 spray by Left Nare route 2 (two) times a day as needed.       butalbital-acetaminophen-caffeine (FIORICET, ESGIC) -40 mg per tablet butalbital-acetaminophen-caffeine 50 mg-325 mg-40 mg tablet   TAKE 1 TABLET BY MOUTH EVERY 4 TO 6 HOURS IF NEEDED FOR HEADACHE       carbamide peroxide (DEBROX) 6.5 % otic solution Administer 5 drops into ears.       cholecalciferol, vitamin D3, 5,000 unit Tab Take 1 tablet by mouth daily.       ciprofloxacin HCl (CIPRO) 250 MG tablet ciprofloxacin 250 mg tablet   TAKE 1 TABLET BY MOUTH EVERY 12 HOURS FOR 7 DAYS       citalopram (CELEXA) 10 MG tablet Take 10 mg by mouth.       colestipol (COLESTID) 1 gram tablet Take 1 g by mouth.       diphenhydrAMINE (BENADRYL) 25 mg capsule Take 25 mg by mouth every 6 (six) hours as needed.        divalproex (DEPAKOTE ER) 250 MG 24 hour tablet divalproex  mg tablet,extended release 24 hr   TAKE 1 TABLET BY MOUTH AT BEDTIME       FLUoxetine (PROZAC) 40 MG capsule fluoxetine 40 mg capsule   TAKE ONE CAPSULE BY MOUTH EVERY DAY       furosemide (LASIX) 20 MG tablet furosemide 20 mg tablet   Take 1 tablet every day by oral route for 30 days.       HYDROcodone-acetaminophen (NORCO) 7.5-325 mg per tablet hydrocodone 7.5 mg-acetaminophen 325 mg tablet   PRN       lamoTRIgine (LAMICTAL) 25 MG tablet lamotrigine 25 mg tablet   TAKE 2 TABLETS BY MOUTH EVERY DAY       levothyroxine (LEVO-T) 50 MCG tablet Take 1 tablet (50 mcg total) by mouth Daily at 6:00 am. 90 tablet 3      licorice root (LICORICE, GLYCYRRHIZA GLABRA, ORAL) Take by mouth.       lidocaine (XYLOCAINE) 5 % ointment Apply to lumbar back area three times a day PRN pain 35.44 g 0     loperamide (IMODIUM A-D) 2 mg tablet Take 1 tablet (2 mg total) by mouth 4 (four) times a day as needed for diarrhea. 60 tablet 1     LORazepam (ATIVAN) 2 MG tablet lorazepam 2 mg tablet   TAKE 1 TABLET BY MOUTH EVERY DAY       losartan (COZAAR) 50 MG tablet losartan 50 mg tablet   TK 1 AND 1/2 TS PO D TO LOWER BP  NEW INCREASED DOSE       naloxone (NARCAN) 4 mg/actuation nasal spray 1 spray (4 mg dose) into one nostril for opioid reversal. Call 911. May repeat if no response in 3 minutes. 1 Box 0     niacin 500 MG tablet Take 500 mg by mouth daily.       OLANZapine (ZYPREXA) 10 MG tablet olanzapine 10 mg tablet   TAKE 1 TABLET BY MOUTH EVERY DAY       OMEGA-3/DHA/EPA/FISH OIL (FISH OIL-OMEGA-3 FATTY ACIDS) 300-1,000 mg capsule Take 1.2 g by mouth 2 (two) times a day.       omeprazole (PRILOSEC) 40 MG capsule Take 1 capsule (40 mg total) by mouth daily. 90 capsule 1     OXcarbazepine (TRILEPTAL) 150 MG tablet Take 150 mg by mouth.       oxyCODONE-acetaminophen (PERCOCET/ENDOCET) 7.5-325 mg per tablet oxycodone-acetaminophen 7.5 mg-325 mg tablet   PRN       potassium chloride 20 mEq TbER potassium chloride ER 20 mEq tablet,extended release   Take 1 tablet every day by oral route for 30 days.       prazosin (MINIPRESS) 1 MG capsule One bedtime for 5 nights, may increase to two bedtime for five nights, if needed three bedtime 60 capsule 2     psyllium (METAMUCIL) 3.4 gram packet Take 1 packet by mouth 2 (two) times a day.  0     QUEtiapine (SEROQUEL) 100 MG tablet quetiapine 100 mg tablet   Take 1 tablet every day by oral route for 30 days.       QUEtiapine (SEROQUEL) 50 MG tablet quetiapine 50 mg tablet   TK 1/2 T PO HS FOR 10 DAYS THEN INCREASE TO 1 T HS       rosuvastatin (CRESTOR) 40 MG tablet Take 1 tablet (40 mg total) by mouth daily. 90  tablet 3     spironolactone (ALDACTONE) 25 MG tablet Take 0.5 tablets (12.5 mg total) by mouth daily. 45 tablet 1     SUMAtriptan (IMITREX) 50 MG tablet Take 1 tablet (50 mg total) by mouth every 2 (two) hours as needed for migraine. 27 tablet 1     topiramate (TOPAMAX) 50 MG tablet Take 1 tablet (50 mg total) by mouth 2 (two) times a day. 180 tablet 1     traZODone (DESYREL) 100 MG tablet TAKE 2 TO 4 TABLETS(200  MG) BY MOUTH AT BEDTIME 360 tablet 1     UNABLE TO FIND Med Name: DGL PLUS 760 mg deglycyrrhizinated licorice plus 100 mg glycine.       valACYclovir (VALTREX) 1000 MG tablet Take 1 tablet (1,000 mg total) by mouth 3 (three) times a day. 30 tablet 1     diphenoxylate-atropine (LOMOTIL) 2.5-0.025 mg per tablet Take 1 tablet by mouth 4 (four) times a day as needed for diarrhea. 30 tablet 1     fentaNYL (DURAGESIC) 50 mcg/hr Place 1 patch on the skin every third day. 10 patch 0     Current Facility-Administered Medications   Medication Dose Route Frequency Provider Last Rate Last Dose     denosumab 60 mg (PROLIA 60 mg/ml)  60 mg Subcutaneous Q6 Months Andrei Olivera MD   60 mg at 18 1126       Social History     Tobacco Use   Smoking Status Former Smoker     Packs/day: 1.00     Years: 20.00     Pack years: 20.00     Last attempt to quit: 2000     Years since quittin.8   Smokeless Tobacco Never Used           OBJECTIVE:        Recent Results (from the past 240 hour(s))   HM2(CBC w/o Differential)   Result Value Ref Range    WBC 3.6 (L) 4.0 - 11.0 thou/uL    RBC 3.61 (L) 3.80 - 5.40 mill/uL    Hemoglobin 11.8 (L) 12.0 - 16.0 g/dL    Hematocrit 35.0 35.0 - 47.0 %    MCV 97 80 - 100 fL    MCH 32.8 27.0 - 34.0 pg    MCHC 33.8 32.0 - 36.0 g/dL    RDW 12.1 11.0 - 14.5 %    Platelets 243 140 - 440 thou/uL    MPV 7.0 7.0 - 10.0 fL       Vitals:    18 1400   BP: 100/56   Patient Site: Left Arm   Patient Position: Sitting   Cuff Size: Adult Regular   Pulse: 82   SpO2: 100%   Weight: 143  lb 11.2 oz (65.2 kg)     Weight: 143 lb 11.2 oz (65.2 kg)          Physical Exam:  GENERAL APPEARANCE: A&A, NAD, well hydrated, well nourished  SKIN:  Normal skin turgor, no lesions/rashes   HEENT: moist mucous membranes, no rhinorrhea  NECK: Normal  CV: RRR, no M/G/R   LUNGS: CTAB  ABDOMEN: Soft, no guarding or rebound, normal bowel sounds  EXTREMITY: no edema, mild tenderness palpation over the left elbow and over the left instep without any real point tender areas, gait is appropriate, mild low back tenderness and midline low back tenderness without step-offs, no swelling  NEURO: no gross deficits     Abdominal x-ray to me shows moderate stool

## 2021-06-21 NOTE — PROGRESS NOTES
[facility] Daily Progress     Patient Name: Sandy Spencer  Date: 11/7/2018  Visit #: 20/24    Referral Diagnosis: L LE pain  Possibly due to shingles  Referring provider: Michael Ramirez DO  Visit Diagnosis:     ICD-10-CM    1. Chronic pain syndrome G89.4    2. Cervical radiculitis M54.12    3. Thoracic spine pain M54.6    4. Left-sided low back pain without sciatica, unspecified chronicity M54.5    5. Left lower quadrant pain R10.32    6. Complex regional pain syndrome type 1 of both lower extremities G90.523          Assessment:     Patient demonstrates understanding/independence with home program.  Patient is benefitting from skilled physical therapy and is making steady progress toward functional goals.  Patient is appropriate to continue with skilled physical therapy intervention, as indicated by initial plan of care.  There was good release of fascial tensions. Overall she is much less tender to palpation in her extremities which is a drastic change from initial evaluation.      Goals:  Pt. will demonstrate/verbalize independence in self-management of condition in : 12 weeks  Pt. will report decreased intensity, frequency of : Pain;in 12 weeks;Comment  Comment:: decrease pain to 2-7/10 with ADLs  Pt. will have improved quality of sleep: waking less times/night;getting 75-90% of required amount;in 12 weeks;Comment  Comment:: increase sleep to 4-6 hours  Pt. will be able to walk : 30 minutes;on even surfaces;with less difficulty;for household mobility;for community mobility;for exercise/recreation;in 12 weeks  Patient will stand : 30 minutes;with less difficultty;for home chores;in 12 weeks  Patient will sit: 30 minutes;for driving;for eating;for watching TV;with less difficultty;in 12 weeks  Patient will reach / maintain arm movement: overhead;for home chores;for dressing;with less difficulty;in 12 weeks  Patient will decrease : TIMA score;by _ points;for improved quality of function;for improved  quality of life;in 12 weeks  by ___ points: 15       Plan / Patient Education:     Continue with initial plan of care.    Subjective:     Pain Ratin-8/10   She fell last week after the last Rx slipping [place] [employer]. She landed on her L hip/knees. She is still forcing herself to function even with the soreness from that.      Objective:     MS, LV fascial tensions.        Treatment Today     TREATMENT MINUTES COMMENTS   Evaluation     Self-care/ Home management     Manual therapy 25 Fascial release using Strain-Counterstrain of BLE/lumbopelvic-Art, L knee/thigh-LV  MFR: L greater occipital nerve   Neuromuscular Re-education 15 Recip inhib of art, LV fascia   Therapeutic Activity     Therapeutic Exercises 15 AA/PROM to BLE, pelvis, C-spine, L-spine   Gait training     Modality__________________                Total 55    Blank areas are intentional and mean the treatment did not include these items.       Rudolph Molina  2018
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
## 2021-06-21 NOTE — PROGRESS NOTES
Kerry has been having abdominal pain.  She is concerned because an x-ray showed that there is significant stool in her colon loose stools, occasionally then with large movements.  She is concerned that she has had obstructions in the past.  She is going be working with GI, concerned that there is been a small intestine ulcer.    She fell 2 weeks ago, landing on her left hip and knee and elbow, twisting her right ankle.  She did go to the emergency room.  There is a flareup with a right ankle pain.  She still continues with significant left hip pain findings very difficult to get out of bed.    Since last seen she did have injections L3-L4 and T11-12 with Dr. Mittal.  They helped somewhat.  She does not want to have more cortisone injections presently.    There continues to be problems with the other people in living near her.  She plans to move to a new. in February.    She did get some of the D GL lozenges that are been helping her stomach.    Continues on fentanyl 50 mcg patches.   reviewed as expected      Current Outpatient Medications:      acetylcysteine, bulk, Powd, 600 mg capsules, take orally three times a day, Disp: 42 Bottle, Rfl: 2     aspirin 81 MG EC tablet, Take 81 mg by mouth daily., Disp: , Rfl:      aspirin-calcium carbonate 81 mg-300 mg calcium(777 mg) Tab, Take 81 mg by mouth., Disp: , Rfl:      atenolol (TENORMIN) 100 MG tablet, atenolol 100 mg tablet  Take 1 tablet every day by oral route., Disp: , Rfl:      azelastine (ASTEPRO) 0.15 % (205.5 mcg) Spry nasal spray, 1 spray by Left Nare route 2 (two) times a day as needed., Disp: , Rfl:      butalbital-acetaminophen-caffeine (FIORICET, ESGIC) -40 mg per tablet, butalbital-acetaminophen-caffeine 50 mg-325 mg-40 mg tablet  TAKE 1 TABLET BY MOUTH EVERY 4 TO 6 HOURS IF NEEDED FOR HEADACHE, Disp: , Rfl:      carbamide peroxide (DEBROX) 6.5 % otic solution, Administer 5 drops into ears., Disp: , Rfl:      cholecalciferol, vitamin D3, 5,000  unit Tab, Take 1 tablet by mouth daily., Disp: , Rfl:      ciprofloxacin HCl (CIPRO) 250 MG tablet, ciprofloxacin 250 mg tablet  TAKE 1 TABLET BY MOUTH EVERY 12 HOURS FOR 7 DAYS, Disp: , Rfl:      citalopram (CELEXA) 10 MG tablet, Take 10 mg by mouth., Disp: , Rfl:      colestipol (COLESTID) 1 gram tablet, Take 1 g by mouth., Disp: , Rfl:      diphenhydrAMINE (BENADRYL) 25 mg capsule, Take 25 mg by mouth every 6 (six) hours as needed. , Disp: , Rfl:      diphenoxylate-atropine (LOMOTIL) 2.5-0.025 mg per tablet, Take 1 tablet by mouth 4 (four) times a day as needed for diarrhea., Disp: 30 tablet, Rfl: 1     divalproex (DEPAKOTE ER) 250 MG 24 hour tablet, divalproex  mg tablet,extended release 24 hr  TAKE 1 TABLET BY MOUTH AT BEDTIME, Disp: , Rfl:      FLUoxetine (PROZAC) 40 MG capsule, fluoxetine 40 mg capsule  TAKE ONE CAPSULE BY MOUTH EVERY DAY, Disp: , Rfl:      furosemide (LASIX) 20 MG tablet, furosemide 20 mg tablet  Take 1 tablet every day by oral route for 30 days., Disp: , Rfl:      lamoTRIgine (LAMICTAL) 25 MG tablet, lamotrigine 25 mg tablet  TAKE 2 TABLETS BY MOUTH EVERY DAY, Disp: , Rfl:      levothyroxine (LEVO-T) 50 MCG tablet, Take 1 tablet (50 mcg total) by mouth Daily at 6:00 am., Disp: 90 tablet, Rfl: 3     licorice root (LICORICE, GLYCYRRHIZA GLABRA, ORAL), Take by mouth., Disp: , Rfl:      lidocaine (XYLOCAINE) 5 % ointment, Apply to lumbar back area three times a day PRN pain, Disp: 35.44 g, Rfl: 0     loperamide (IMODIUM A-D) 2 mg tablet, Take 1 tablet (2 mg total) by mouth 4 (four) times a day as needed for diarrhea., Disp: 60 tablet, Rfl: 1     LORazepam (ATIVAN) 2 MG tablet, lorazepam 2 mg tablet  TAKE 1 TABLET BY MOUTH EVERY DAY, Disp: , Rfl:      losartan (COZAAR) 50 MG tablet, losartan 50 mg tablet  TK 1 AND 1/2 TS PO D TO LOWER BP  NEW INCREASED DOSE, Disp: , Rfl:      naloxone (NARCAN) 4 mg/actuation nasal spray, 1 spray (4 mg dose) into one nostril for opioid reversal. Call 911.  May repeat if no response in 3 minutes., Disp: 1 Box, Rfl: 0     niacin 500 MG tablet, Take 500 mg by mouth daily., Disp: , Rfl:      OLANZapine (ZYPREXA) 10 MG tablet, olanzapine 10 mg tablet  TAKE 1 TABLET BY MOUTH EVERY DAY, Disp: , Rfl:      OMEGA-3/DHA/EPA/FISH OIL (FISH OIL-OMEGA-3 FATTY ACIDS) 300-1,000 mg capsule, Take 1.2 g by mouth 2 (two) times a day., Disp: , Rfl:      omeprazole (PRILOSEC) 40 MG capsule, Take 1 capsule (40 mg total) by mouth daily., Disp: 90 capsule, Rfl: 1     OXcarbazepine (TRILEPTAL) 150 MG tablet, Take 150 mg by mouth., Disp: , Rfl:      oxyCODONE-acetaminophen (PERCOCET/ENDOCET) 7.5-325 mg per tablet, oxycodone-acetaminophen 7.5 mg-325 mg tablet  PRN, Disp: , Rfl:      potassium chloride 20 mEq TbER, potassium chloride ER 20 mEq tablet,extended release  Take 1 tablet every day by oral route for 30 days., Disp: , Rfl:      prazosin (MINIPRESS) 1 MG capsule, One bedtime for 5 nights, may increase to two bedtime for five nights, if needed three bedtime, Disp: 60 capsule, Rfl: 2     psyllium (METAMUCIL) 3.4 gram packet, Take 1 packet by mouth 2 (two) times a day., Disp: , Rfl: 0     QUEtiapine (SEROQUEL) 100 MG tablet, quetiapine 100 mg tablet  Take 1 tablet every day by oral route for 30 days., Disp: , Rfl:      QUEtiapine (SEROQUEL) 50 MG tablet, quetiapine 50 mg tablet  TK 1/2 T PO HS FOR 10 DAYS THEN INCREASE TO 1 T HS, Disp: , Rfl:      rosuvastatin (CRESTOR) 40 MG tablet, Take 1 tablet (40 mg total) by mouth daily., Disp: 90 tablet, Rfl: 3     spironolactone (ALDACTONE) 25 MG tablet, Take 0.5 tablets (12.5 mg total) by mouth daily., Disp: 45 tablet, Rfl: 1     SUMAtriptan (IMITREX) 50 MG tablet, Take 1 tablet (50 mg total) by mouth every 2 (two) hours as needed for migraine., Disp: 27 tablet, Rfl: 1     topiramate (TOPAMAX) 50 MG tablet, Take 1 tablet (50 mg total) by mouth 2 (two) times a day., Disp: 180 tablet, Rfl: 1     traZODone (DESYREL) 100 MG tablet, TAKE 2 TO 4  "TABLETS(200  MG) BY MOUTH AT BEDTIME, Disp: 360 tablet, Rfl: 1     UNABLE TO FIND, Med Name: DGL PLUS 760 mg deglycyrrhizinated licorice plus 100 mg glycine., Disp: , Rfl:      valACYclovir (VALTREX) 1000 MG tablet, Take 1 tablet (1,000 mg total) by mouth 3 (three) times a day., Disp: 30 tablet, Rfl: 1     fentaNYL (DURAGESIC) 50 mcg/hr, Place 1 patch on the skin every third day., Disp: 10 patch, Rfl: 0     HYDROcodone-acetaminophen (NORCO) 7.5-325 mg per tablet, hydrocodone 7.5 mg-acetaminophen 325 mg tablet  PRN, Disp: , Rfl:      naloxegol (MOVANTIK) 12.5 mg Tab tablet, Take 1 tablet (12.5 mg total) by mouth daily before breakfast., Disp: 30 tablet, Rfl: 2    Current Facility-Administered Medications:      denosumab 60 mg (PROLIA 60 mg/ml), 60 mg, Subcutaneous, Q6 Months, Andrei Olivera MD, 60 mg at 08/13/18 1126  Blood pressure 111/65, pulse 64, resp. rate 16, height 5' 2\" (1.575 m), weight 143 lb (64.9 kg), not currently breastfeeding.    Pain score 8.  She is alert with a clear sensorium appropriately groomed.  Thought process tight logical.  Speech is not pressured.  Affect is somewhat constricted.    Impression: We discussed she may be having some problems with some stool blockage with diarrhea past this.  Will have a trial of Movantik or Relistor to see if this helps with opioid-induced constipation, while she begins bulking agents.  She will be cautious if there are 6 symptoms similar to past obstructions call to be seen.    We will repeat an x-ray of her left hip and knee which have been bothering her since her fall.    Continue psychotropics as above.    Time spent 25 minutes face-to-face more than 50% count about above condition coronation treatment plan      "

## 2021-06-21 NOTE — LETTER
Letter by Caitlyn Rees MD at      Author: Caitlyn Rees MD Service: -- Author Type: --    Filed:  Encounter Date: 1/7/2021 Status: (Other)         Sandy Spencer  [address] [place] [address]             January 7, 2021         Dear Ms. Spencer,    Below are the results from your recent visit:    Resulted Orders   Comprehensive Metabolic Panel   Result Value Ref Range    Sodium 135 (L) 136 - 145 mmol/L    Potassium 4.2 3.5 - 5.0 mmol/L    Chloride 100 98 - 107 mmol/L    CO2 25 22 - 31 mmol/L    Anion Gap, Calculation 10 5 - 18 mmol/L    Glucose 82 70 - 125 mg/dL    BUN 21 8 - 28 mg/dL    Creatinine 1.41 (H) 0.60 - 1.10 mg/dL    GFR MDRD Af Amer 44 (L) >60 mL/min/1.73m2    GFR MDRD Non Af Amer 36 (L) >60 mL/min/1.73m2    Bilirubin, Total 0.4 0.0 - 1.0 mg/dL    Calcium 8.4 (L) 8.5 - 10.5 mg/dL    Protein, Total 6.3 6.0 - 8.0 g/dL    Albumin 3.9 3.5 - 5.0 g/dL    Alkaline Phosphatase 45 45 - 120 U/L    AST 41 (H) 0 - 40 U/L    ALT 18 0 - 45 U/L    Narrative    Fasting Glucose reference range is 70-99 mg/dL per  American Diabetes Association (ADA) guidelines.   HM2(CBC w/o Differential)   Result Value Ref Range    WBC 2.5 (L) 4.0 - 11.0 thou/uL    RBC 3.82 3.80 - 5.40 mill/uL    Hemoglobin 12.1 12.0 - 16.0 g/dL    Hematocrit 36.0 35.0 - 47.0 %    MCV 94 80 - 100 fL    MCH 31.6 27.0 - 34.0 pg    MCHC 33.5 32.0 - 36.0 g/dL    RDW 11.7 11.0 - 14.5 %    Platelets 116 (L) 140 - 440 thou/uL    MPV 7.3 7.0 - 10.0 fL   T4, Free   Result Value Ref Range    Free T4 1.2 0.7 - 1.8 ng/dL   Vitamin B12   Result Value Ref Range    Vitamin B-12 825 (H) 213 - 816 pg/mL   Thyroid Stimulating Hormone (TSH)   Result Value Ref Range    TSH 0.58 0.30 - 5.00 uIU/mL       Your labs are generally stable: Normal thyroid, B12. Your platelets are down some which can be from an illness, but your kidneys look good. We'll repeat these next time you are in.     Please call with questions or contact us using  [employer].    Sincerely,        Electronically signed by Caitlyn Rees MD

## 2021-06-21 NOTE — LETTER
Letter by Caitlyn Rees MD at      Author: Caitlyn Rees MD Service: -- Author Type: --    Filed:  Encounter Date: 11/18/2020 Status: (Other)         Sandy Spencer  [address] [place] [address]             November 18, 2020         Dear Ms. Spencer,    Below are the results from your recent visit:    Resulted Orders   Basic Metabolic Panel   Result Value Ref Range    Sodium 144 136 - 145 mmol/L    Potassium 4.2 3.5 - 5.0 mmol/L    Chloride 107 98 - 107 mmol/L    CO2 24 22 - 31 mmol/L    Anion Gap, Calculation 13 5 - 18 mmol/L    Glucose 90 70 - 125 mg/dL    Calcium 9.1 8.5 - 10.5 mg/dL    BUN 25 8 - 28 mg/dL    Creatinine 1.49 (H) 0.60 - 1.10 mg/dL    GFR MDRD Af Amer 41 (L) >60 mL/min/1.73m2    GFR MDRD Non Af Amer 34 (L) >60 mL/min/1.73m2    Narrative    Fasting Glucose reference range is 70-99 mg/dL per  American Diabetes Association (ADA) guidelines.       Your labs look relatively stable which is great news.     Please call with questions or contact us using [employer].    Sincerely,        Electronically signed by Caitlyn Rees MD

## 2021-06-21 NOTE — LETTER
Letter by Caitlyn Rees MD at      Author: Caitlyn Rees MD Service: -- Author Type: --    Filed:  Encounter Date: 4/26/2021 Status: (Other)         Sandy Spencer  [address] [place] [address]             April 26, 2021         Dear Ms. Spencer,    Below are the results from your recent visit:    Resulted Orders   Basic Metabolic Panel   Result Value Ref Range    Sodium 137 136 - 145 mmol/L    Potassium 4.2 3.5 - 5.0 mmol/L    Chloride 103 98 - 107 mmol/L    CO2 24 22 - 31 mmol/L    Anion Gap, Calculation 10 5 - 18 mmol/L    Glucose 82 70 - 125 mg/dL    Calcium 8.9 8.5 - 10.5 mg/dL    BUN 26 8 - 28 mg/dL    Creatinine 1.21 (H) 0.60 - 1.10 mg/dL    GFR MDRD Af Amer 52 (L) >60 mL/min/1.73m2    GFR MDRD Non Af Amer 43 (L) >60 mL/min/1.73m2    Narrative    Fasting Glucose reference range is 70-99 mg/dL per  American Diabetes Association (ADA) guidelines.       Your kidneys are stable and your electrolytes are normal.     Please call with questions or contact us using [employer].    Sincerely,        Electronically signed by Caitlyn Rees MD

## 2021-06-21 NOTE — LETTER
Letter by Caitlyn Rees MD at      Author: Caitlyn Rees MD Service: -- Author Type: --    Filed:  Encounter Date: 12/6/2020 Status: (Other)         Sandy Spencer  [address] [place] [address]             December 6, 2020         Dear Ms. Spencer,    Below are the results from your recent visit:    Resulted Orders   Basic Metabolic Panel   Result Value Ref Range    Sodium 138 136 - 145 mmol/L    Potassium 4.3 3.5 - 5.0 mmol/L    Chloride 102 98 - 107 mmol/L    CO2 25 22 - 31 mmol/L    Anion Gap, Calculation 11 5 - 18 mmol/L    Glucose 83 70 - 125 mg/dL    Calcium 9.0 8.5 - 10.5 mg/dL    BUN 19 8 - 28 mg/dL    Creatinine 1.47 (H) 0.60 - 1.10 mg/dL    GFR MDRD Af Amer 42 (L) >60 mL/min/1.73m2    GFR MDRD Non Af Amer 34 (L) >60 mL/min/1.73m2    Narrative    Fasting Glucose reference range is 70-99 mg/dL per  American Diabetes Association (ADA) guidelines.       Your kidneys are stable which is great news.     Please call with questions or contact us using [employer].    Sincerely,        Electronically signed by Caitlyn Rees MD

## 2021-06-21 NOTE — LETTER
Letter by Caroline Sumner NP at      Author: Caroline Sumner NP Service: -- Author Type: --    Filed:  Encounter Date: 3/16/2021 Status: (Other)         Sandy Spencer  [address] [place] [address]             March 16, 2021         Dear Ms. Spencer,    Below are the results from your recent visit:    Resulted Orders   DXA Bone Density Scan    Narrative    3/9/2021      RE: Sandy Spencer  YOB: 1940        Dear Caroline Sumner,    Patient Profile:  80 y.o. female, postmenopausal, is here for the first bone density test.   History of fractures - None. Family history of osteoporosis - None.    Family history of hip fracture: None. Smoking history - Past. Osteoporosis   treatment past -  Yes;  Bisphosphonates. Osteoporosis treatment current -   No.  Chronic medical problems - Chronic low back problems, History of   kidney stones and Spine surgery. High risk medications -  Blood thinner   (Coumadin or Heparin);  Yes, Currently and Thyroid;  Yes, Currently.      Assessment:    1. The spine bone density L1-L4 with T-score 2.0 and significant   artifacts.  2. Femoral bone densities show left femoral neck T- score -1.8 and right   femoral neck T-score -1.5.  3. Trabecular bone score indicates good trabecular bone architecture.  4. Left forearm bone density with T-score -2.3.    80 y.o. female with LOW BONE DENSITY (OSTEOPENIA) and HIGH fracture risk,   adjusted for the TBS, with major osteoporotic fracture risk 12.9% and hip   fracture risk 3.9%.     Previous scan was done on a different machine and is not directly   comparable with the current study.    Recommendations:  Appropriate evaluation and treatment recommended with follow up bone   density scan after 1 year of active treatment.      Bone densitometry was performed on your patient using our Intermolecular   densitometer. The results are summarized and a copy of the actual scans   are included for your review. In conformity with the  International Society   of Clinical Densitometry's most recent position statement for DXA   interpretation (2015), the diagnosis will be made on the lowest measured   T-score of the lumbar spine, femoral neck, total proximal femur or 33%   radius. Note the change in terminology for diagnostic classification from   OSTEOPENIA to LOW BONE MASS. All trending for sequential exams will be   done using multiple vertebrae or the total proximal femur. Fracture risk   is based on the WHO Fracture Risk Assessment Tool (FRAX). If additional   information is needed or if you would like to discuss the results, please   do not hesitate to call me.       Thank you for referring this patient to [facility]. We are happy to be of service in support of you and your   practice. If you have any questions or suggestions to improve our service,   please call me at [phone].     Sincerely,     Marisa Thakkar M.D. MERLYCLATOSHA.  [facility]     The test results show that your Bone Mineral Density is stable. Please continue your current  plan. We recommend that you repeat the above test(s) in 2 years.    Please call with questions or contact us using [employer].    Sincerely,        Electronically signed by Caroline Sumner NP

## 2021-06-21 NOTE — LETTER
Letter by Caitlyn Rees MD at      Author: Caitlyn Rees MD Service: -- Author Type: --    Filed:  Encounter Date: 3/25/2021 Status: (Other)         Sandy Spencer  [address] [place] [address]       March 25, 2021       Dear Ms. Spencer,    Below are the results from your recent visit:    Resulted Orders   Urinalysis Macroscopic   Result Value Ref Range    Color, UA Yellow Colorless, Yellow, Straw, Light Yellow    Clarity, UA Clear Clear    Glucose, UA Negative Negative    Bilirubin, UA Negative Negative    Ketones, UA Trace (!) Negative    Specific Gravity, UA 1.025 1.005 - 1.030    Blood, UA Negative Negative    pH, UA 6.5 5.0 - 8.0    Protein, UA 30 mg/dL (!) Negative    Urobilinogen, UA 0.2 E.U./dL 0.2 E.U./dL, 1.0 E.U./dL    Nitrite, UA Negative Negative    Leukocytes, UA Negative Negative   Culture, Urine   Result Value Ref Range    Culture No Growth        Your UTI as resolved. Great news.     Please call with questions or contact us using [employer].    Sincerely,        Electronically signed by Caitlyn Rees MD

## 2021-06-21 NOTE — LETTER
Letter by Caitlyn Rees MD at      Author: Caitlyn Rees MD Service: -- Author Type: --    Filed:  Encounter Date: 2/12/2021 Status: (Other)         Sandy Spencer  [address] [place] [address]             February 12, 2021         Dear Ms. Spencer,    Below are the results from your recent visit:    Resulted Orders   Basic Metabolic Panel   Result Value Ref Range    Sodium 137 136 - 145 mmol/L    Potassium 4.0 3.5 - 5.0 mmol/L    Chloride 101 98 - 107 mmol/L    CO2 23 22 - 31 mmol/L    Anion Gap, Calculation 13 5 - 18 mmol/L    Glucose 81 70 - 125 mg/dL    Calcium 8.7 8.5 - 10.5 mg/dL    BUN 19 8 - 28 mg/dL    Creatinine 1.43 (H) 0.60 - 1.10 mg/dL    GFR MDRD Af Amer 43 (L) >60 mL/min/1.73m2    GFR MDRD Non Af Amer 35 (L) >60 mL/min/1.73m2    Narrative    Fasting Glucose reference range is 70-99 mg/dL per  American Diabetes Association (ADA) guidelines.   Lipid [employer] FASTING   Result Value Ref Range    Cholesterol 167 <=199 mg/dL    Triglycerides 87 <=149 mg/dL    HDL Cholesterol 85 >=50 mg/dL    LDL Calculated 65 <=129 mg/dL    Patient Fasting > 8hrs? Yes    HM1 (CBC with Diff)   Result Value Ref Range    WBC 5.0 4.0 - 11.0 thou/uL    RBC 3.82 3.80 - 5.40 mill/uL    Hemoglobin 12.1 12.0 - 16.0 g/dL    Hematocrit 37.1 35.0 - 47.0 %    MCV 97 80 - 100 fL    MCH 31.7 27.0 - 34.0 pg    MCHC 32.6 32.0 - 36.0 g/dL    RDW 14.6 (H) 11.0 - 14.5 %    Platelets 199 140 - 440 thou/uL    MPV 9.1 8.5 - 12.5 fL    Neutrophils % 64 50 - 70 %    Lymphocytes % 19 (L) 20 - 40 %    Monocytes % 14 (H) 2 - 10 %    Eosinophils % 1 0 - 6 %    Basophils % 1 0 - 2 %    Immature Granulocyte % 0 <=0 %    Neutrophils Absolute 3.2 2.0 - 7.7 thou/uL    Lymphocytes Absolute 1.0 0.8 - 4.4 thou/uL    Monocytes Absolute 0.7 0.0 - 0.9 thou/uL    Eosinophils Absolute 0.1 0.0 - 0.4 thou/uL    Basophils Absolute 0.0 0.0 - 0.2 thou/uL    Immature Granulocyte Absolute 0.0 <=0.0 thou/uL       Your cholesterol looks  great! Your kidneys are stable and your electrolytes are normal.  Your white blood cell count and red blood cell count are back to normal as well.     Please call with questions or contact us using [employer].    Sincerely,        Electronically signed by Caitlyn Rees MD

## 2021-06-21 NOTE — PROGRESS NOTES
[facility] Daily Progress     Patient Name: Sandy Spencer  Date: 10/30/2018  Visit #:19/24    Referral Diagnosis: L LE pain  Possibly due to shingles  Referring provider: Michael Ramirez DO  Visit Diagnosis:     ICD-10-CM    1. Chronic pain syndrome G89.4    2. Cervical radiculitis M54.12    3. Thoracic spine pain M54.6    4. Left-sided low back pain without sciatica, unspecified chronicity M54.5    5. Left lower quadrant pain R10.32    6. Complex regional pain syndrome type 1 of both lower extremities G90.523          Assessment:     Patient demonstrates understanding/independence with home program.  Patient is benefitting from skilled physical therapy and is making steady progress toward functional goals.  Patient is appropriate to continue with skilled physical therapy intervention, as indicated by initial plan of care.  She did have a very good release of periosteal work today. This should help to improve both her LE blood flow as well as decrease ANS activation in her.      Goals:  Pt. will demonstrate/verbalize independence in self-management of condition in : 12 weeks  Pt. will report decreased intensity, frequency of : Pain;in 12 weeks;Comment  Comment:: decrease pain to 2-7/10 with ADLs  Pt. will have improved quality of sleep: waking less times/night;getting 75-90% of required amount;in 12 weeks;Comment  Comment:: increase sleep to 4-6 hours  Pt. will be able to walk : 30 minutes;on even surfaces;with less difficulty;for household mobility;for community mobility;for exercise/recreation;in 12 weeks  Patient will stand : 30 minutes;with less difficultty;for home chores;in 12 weeks  Patient will sit: 30 minutes;for driving;for eating;for watching TV;with less difficultty;in 12 weeks  Patient will reach / maintain arm movement: overhead;for home chores;for dressing;with less difficulty;in 12 weeks  Patient will decrease : TIMA score;by _ points;for improved quality of function;for improved  "quality of life;in 12 weeks  by ___ points: 15       Plan / Patient Education:     Continue with initial plan of care.    Subjective:     Pain Ratin-8/10   She have 2 injections last Wednesday and it did hurt worse after these. She feels like they haven't really hit \"the spot\" with them this time.   The severe burning she has been having with the shingles is starting to get better.   She is going to have a stent put in the pancreatic duct/bile duct but hasn't had that done yet to her phone having been not working. She is also having a endoscopy to assess this area.   She isn't sleeping that good at the moment as well.       Objective:     MS, LV fascial tensions.        Treatment Today     TREATMENT MINUTES COMMENTS   Evaluation     Self-care/ Home management     Manual therapy 25 Fascial release using Strain-Counterstrain of BLE (P)-MS (did not treat sacrum), B C0 EPI-LV, L C1 F (P)-MS   Neuromuscular Re-education 15 Recip inhib of MS, LV fascia   Therapeutic Activity     Therapeutic Exercises 15 AA/PROM to BLE, pelvis, C-spine   Gait training     Modality__________________                Total 55    Blank areas are intentional and mean the treatment did not include these items.       Rudolph Molina  10/30/2018    "

## 2021-06-21 NOTE — PROGRESS NOTES
[facility] Daily Progress     Patient Name: Sandy Spencer  Date: 10/18/2018  Visit #:  visit for Med cert count    Referral Diagnosis: Chronic Pain  Shingles  Referring provider: Michael Ramirez DO  Visit Diagnosis:     ICD-10-CM    1. Chronic pain syndrome G89.4    2. Cervical radiculitis M54.12    3. Thoracic spine pain M54.6    4. Left-sided low back pain without sciatica, unspecified chronicity M54.5    5. Left lower quadrant pain R10.32    6. Complex regional pain syndrome type 1 of both lower extremities G90.523    7. 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 M48.07    8. Shingles B02.9          Assessment:     Patient is benefitting from skilled physical therapy and is making steady progress toward functional goals.  Patient is appropriate to continue with skilled physical therapy intervention, as indicated by initial plan of care.    Goal Status:  Pt. will demonstrate/verbalize independence in self-management of condition in : Progressing toward  Pt. will report decreased intensity, frequency of : Progressing toward  Pt. will have improved quality of sleep: Partially met  Pt. will be able to walk : Progressing toward  Patient will stand : Partially met  Patient will sit: Met  Patient will reach / maintain arm movement: Partially met  Patient will decrease : Progressing toward    Plan / Patient Education:     Continue with initial plan of care.    Subjective:     Pain Ratin  Shingles      Objective:     L side pelvic and LE pain  L SI upslip   L LE lymphatic restriction 16 sec / cycle.       Treatment Today     TREATMENT MINUTES COMMENTS   Evaluation     Self-care/ Home management     Manual therapy 60 MFR with OA, L5-SI and SI joint releases, Dural Tube Pull,   MFR to the head and Neck,   SCS to   L SAPH-LV,   SCS to PES-LV,   LF T12-MS L,   DFEM-LV,   MSUR-LV,   DSAPH-LV,   SSAPH-LV,   FIB-LV,  ATIB-LV,   PF-LV,   MTARS1-LV,   DM and L PERFS-LV,       Lymphatic - Venous  circulation restored to normal to the L LE.   Pain to the L Leg and pelvis reduced 8/10 to 3/10.  SI dysf resolved  Pt Ind with the application of Tens pads for singles pain   Neuromuscular Re-education     Therapeutic Activity     Therapeutic Exercises     Gait training     Modality__________________ 0 Reviewed Instruction in TENS electrode pad placement for Shingles Pain Bilat knee and L Sacrum into L Hip              Total 60    Blank areas are intentional and mean the treatment did not include these items.       Yogi Velazquez  10/18/2018

## 2021-06-21 NOTE — PROGRESS NOTES
ASSESSMENT/PLAN:       1. Anxiety and depression  -The patient and I discussed the fact that she continues to be really quite tearful and anxious.  I do think she would benefit from seeing a psychiatrist for further evaluation and assessment because although her pain physician is a psychiatrist he does not have the resources to be able to treat her mental health as an depth as she probably needs at this time.  At the time she expresses understanding of this and is accepting of the referral, however when she sees her pain physician the following day she does remark that she is frustrated with this that she does not like to see at least physicians.  - C. difficile Toxigenic by PCR  - omeprazole (PRILOSEC) 40 MG capsule; Take 1 capsule (40 mg total) by mouth daily.  Dispense: 30 capsule; Refill: 3  - Ambulatory referral to Psychology  - valACYclovir (VALTREX) 500 MG tablet; Take 1 tablet (500 mg total) by mouth 3 (three) times a day for 7 days.  Dispense: 21 tablet; Refill: 0  - Pancreatic Elastase, Stool; Future  - Pancreatic Elastase, Fecal by [facility] - Misc. Lab Test  - [facility] Misc Test    2. LUQ abdominal pain  -Orders were placed by GI for tests as listed below.  - C. difficile Toxigenic by PCR  - omeprazole (PRILOSEC) 40 MG capsule; Take 1 capsule (40 mg total) by mouth daily.  Dispense: 30 capsule; Refill: 3  - Ambulatory referral to Psychology  - valACYclovir (VALTREX) 500 MG tablet; Take 1 tablet (500 mg total) by mouth 3 (three) times a day for 7 days.  Dispense: 21 tablet; Refill: 0  - Pancreatic Elastase, Stool; Future  - Pancreatic Elastase, Fecal by [facility] - Misc. Lab Test  - [facility] Misc Test    3. Diarrhea  -I did order a C. difficile test for the patient, and the sample she brought in was too solid for testing, she did bring another one in today.  - C. difficile Toxigenic by PCR  - omeprazole (PRILOSEC) 40 MG capsule; Take 1 capsule (40 mg total) by mouth daily.  Dispense: 30 capsule; Refill: 3  - Ambulatory  referral to Psychology  - valACYclovir (VALTREX) 500 MG tablet; Take 1 tablet (500 mg total) by mouth 3 (three) times a day for 7 days.  Dispense: 21 tablet; Refill: 0  - Pancreatic Elastase, Stool; Future  - Pancreatic Elastase, Fecal by [facility] - Misc. Lab Test  - [facility] Misc Test    4. Shingles  -Does appear to have a recurrence of shingles on her left bottom, although most of the areas are scabbed over given her significant pain I am going to start her on Valtrex to see if this helps.  Plan on following up here in a few weeks.  - C. difficile Toxigenic by PCR  - omeprazole (PRILOSEC) 40 MG capsule; Take 1 capsule (40 mg total) by mouth daily.  Dispense: 30 capsule; Refill: 3  - Ambulatory referral to Psychology  - valACYclovir (VALTREX) 500 MG tablet; Take 1 tablet (500 mg total) by mouth 3 (three) times a day for 7 days.  Dispense: 21 tablet; Refill: 0  - Pancreatic Elastase, Stool; Future  - Pancreatic Elastase, Fecal by [facility] - Misc. Lab Test  - [facility] Misc Test    5. Elevated lipase  -Orders as listed below per gastroenterology.  - C. difficile Toxigenic by PCR  - omeprazole (PRILOSEC) 40 MG capsule; Take 1 capsule (40 mg total) by mouth daily.  Dispense: 30 capsule; Refill: 3  - Ambulatory referral to Psychology  - valACYclovir (VALTREX) 500 MG tablet; Take 1 tablet (500 mg total) by mouth 3 (three) times a day for 7 days.  Dispense: 21 tablet; Refill: 0  - Pancreatic Elastase, Stool; Future  - Pancreatic Elastase, Fecal by [facility] - Misc. Lab Test  - [facility] Misc Test    30 minutes was spent face-to-face with the patient during this encounter and >50% of this was spent on counseling and coordination of care. We discussed in depth the topics listed above.       Caitlyn Rees MD      PROGRESS NOTE   10/15/2018    SUBJECTIVE:  Sandy Spencer is a 78 y.o. female  who presents for follow up.     She comes in today for a follow up on several medical issues. She has had a headache now for several days. She  doesn't think that the Topamax is working for her.     She doesn't know if this is part of it. She cannot do a bit of work. She does continue to struggle with the noise upstairs. She continues to struggle with pain. She doeshave the shingles back, she has burning pain down the lateral leg, has pain even with walking. She did not have the Valtrex re-started. She does think that this is due to the stress of living where she does. She does have an option to move into a top floor apartment.     She does know that she needs to have another scope, needs to have this done. She is getting sicker and sicker. She continues to have diarrhea as well. She had one day of constipation but ohterwise is having diarrhea.  She is frustrated as she did see gastroenterology somewhat recently and was told she needed to have some tests done but has called them to start coordinating scheduling of this and has not heard back from them.  One is a scope which she understands the cost of it with another one was a breath test looking for bacterial overgrowth, and she was trying to get a hold of her insurance to see how much this will cost prior to getting it done. She has increased pain in her luq. She has been drinking bone broth. She has lost five more pounds since she was last in. She doesn't want to take more fentanyl.  At this point she is desperate to start feeling better, she does not know if this is from her pancreatitis or if it is due to something else.    She is seeing Dr. Lynn tomorrow.     She is sleeping better with the change back to trazodone. She isn't getting more than 4 hours of sleep at night. She does worry that she is getting very depressed, doesn't want to get off the couch.  She has had just a few severe flares of depression in her lifetime the last time was when she was in [place] and she refused to go out of her house for quite some time.  She does not want this to happen again but is fearful that it will because  of how poor she feels.      Chief Complaint   Patient presents with     Herpes Zoster     Patient would like to follow up on her shingles. She still has the singles on her left buttock. The area burns.      Medication Management     Patient would like to review her current medications. She wonders about needing to wear a medical alert braclet in regards to certain medications that she can not take.      GI Problem     Patient would like to follow up on her GI concerns. She is down another 5 pounds since her last office visit.          Patient Active Problem List   Diagnosis     Chronic kidney disease (CKD) stage G3a/A1, moderately decreased glomerular filtration rate (GFR) between 45-59 mL/min/1.73 square meter and albuminuria creatinine ratio less than 30 mg/g (H)     Focal glomerular sclerosis     Anxiety and depression     RSD lower limb, bilateral     ADD (attention deficit disorder)     RSD upper limb, right     Other specified hypothyroidism     Anxiety     Osteopenia     Major depression     Hypertension     Insomnia     Mixed hyperlipidemia     Right rotator cuff tear     Cluster headaches     Lumbar radiculopathy     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     Reflex sympathetic dystrophy     Acute encephalopathy     Temporomandibular joint-pain-dysfunction syndrome (TMJ)     Pancreatitis     Localized swelling of lower leg     Medical cannabis use     Acute right-sided low back pain without sciatica     Acute exacerbation of chronic low back pain     Acquired hypothyroidism     Chronic pain syndrome     Non morbid obesity due to excess calories     Snoring     Inadequate pain control     Diarrhea     Abdominal pain     Dermatochalasis of left eyelid     Bilateral carotid artery stenosis     Coronary artery disease involving native coronary artery of native heart without angina pectoris     Sinus bradycardia       Current Outpatient Prescriptions   Medication Sig Dispense Refill     acetylcysteine,  bulk, Powd 600 mg capsules, take orally three times a day 42 Bottle 2     aspirin 81 MG EC tablet Take 81 mg by mouth daily.       azelastine (ASTEPRO) 0.15 % (205.5 mcg) Spry nasal spray 1 spray by Left Nare route 2 (two) times a day as needed.       cholecalciferol, vitamin D3, 5,000 unit Tab Take 1 tablet by mouth daily.       diphenhydrAMINE (BENADRYL) 25 mg capsule Take 25 mg by mouth every 6 (six) hours as needed.        [START ON 10/19/2018] fentaNYL (DURAGESIC) 50 mcg/hr Place 1 patch on the skin every third day. 10 patch 0     lidocaine (XYLOCAINE) 5 % ointment Apply to lumbar back area three times a day PRN pain 35.44 g 0     loperamide (IMODIUM A-D) 2 mg tablet Take 1 tablet (2 mg total) by mouth 4 (four) times a day as needed for diarrhea. 60 tablet 1     naloxone (NARCAN) 4 mg/actuation nasal spray 1 spray (4 mg dose) into one nostril for opioid reversal. Call 911. May repeat if no response in 3 minutes. 1 Box 0     OMEGA-3/DHA/EPA/FISH OIL (FISH OIL-OMEGA-3 FATTY ACIDS) 300-1,000 mg capsule Take 1.2 g by mouth 2 (two) times a day.       omeprazole (PRILOSEC) 40 MG capsule Take 1 capsule (40 mg total) by mouth daily. 30 capsule 3     prazosin (MINIPRESS) 1 MG capsule One bedtime for 5 nights, may increase to two bedtime for five nights, if needed three bedtime 60 capsule 2     psyllium (METAMUCIL) 3.4 gram packet Take 1 packet by mouth 2 (two) times a day.  0     rosuvastatin (CRESTOR) 40 MG tablet Take 1 tablet (40 mg total) by mouth daily. 90 tablet 3     spironolactone (ALDACTONE) 25 MG tablet Take 1 tablet (25 mg total) by mouth daily. 90 tablet 1     SUMAtriptan (IMITREX) 50 MG tablet Take 1 tablet (50 mg total) by mouth every 2 (two) hours as needed for migraine. 27 tablet 1     topiramate (TOPAMAX) 50 MG tablet Take 1 tablet (50 mg total) by mouth 2 (two) times a day. 180 tablet 1     traZODone (DESYREL) 100 MG tablet TAKE 2 TO 4 TABLETS(200  MG) BY MOUTH AT BEDTIME 360 tablet 1      valACYclovir (VALTREX) 500 MG tablet Take 1 tablet (500 mg total) by mouth 3 (three) times a day for 7 days. 21 tablet 0     Current Facility-Administered Medications   Medication Dose Route Frequency Provider Last Rate Last Dose     denosumab 60 mg (PROLIA 60 mg/ml)  60 mg Subcutaneous Q6 Months Andrei Olivera MD   60 mg at 08/13/18 1126       History   Smoking Status     Former Smoker     Packs/day: 1.00     Years: 20.00     Quit date: 1/1/2000   Smokeless Tobacco     Never Used           OBJECTIVE:        Recent Results (from the past 240 hour(s))   C. difficile Toxigenic by PCR   Result Value Ref Range    C.Difficile Toxigenic by PCR Negative Negative    Ribotype 027/NAP1/B1 Presumptive Negative Presumptive Negative       Vitals:    10/15/18 1015   BP: 124/70   Patient Site: Left Arm   Patient Position: Sitting   Cuff Size: Adult Regular   Pulse: (!) 118   SpO2: 98%   Weight: 142 lb 6.4 oz (64.6 kg)     Weight: 142 lb 6.4 oz (64.6 kg)        Physical Exam:  GENERAL APPEARANCE: A&A, NAD, well hydrated, well nourished  SKIN:  Normal skin turgor, scabbed over lesions on the left bottom in a dermatomal pattern without surrounding cellulitis or erythema  HEENT: moist mucous membranes, no rhinorrhea  NECK: Normal  CV: RRR, no M/G/R   LUNGS: CTAB  ABDOMEN: Soft, diffusely tender, no guarding or rebound  EXTREMITY: no edema   Psych: The patient's affect is depressed, her eye contact is fair, she is quite tearful today, her thought process and speech pattern are fairly directed today

## 2021-06-21 NOTE — PROGRESS NOTES
Mental Health Visit Note    10/16/2018   Start time: 1100    Stop Time: 1150   Session # 13    Session Type: Patient is presenting for an Individual session.    Sandy Spencer is a 78 y.o. female is being seen today for    Chief Complaint   Patient presents with     Depression/Anxiety   .     New symptoms or complaints: None    Functional Impairment:   Personal: 4  Family: 4  Work: NA  Social:4    Clinical assessment of mental status: Patient presented alert and oriented x3.  She was well-groomed.  Her attire was appropriate.  Patient was cooperative.  Patient motor actively was normal and eye contact appropriate.  Patients mood was depressed and affect tearful.  Patient s speech and language was normal.  Patient attention and thought process normal.  Concentration was appropriate.  Patient denied delusions or hallucinations. Patient denied suicidal or homicidal ideation.  Patient denied any long or short term memory problems. No evidence of impairment in judgment.   She has insight and fund of knowledge was adequate.        Suicidal/Homicidal Ideation present: None Reported This Session    Patient's impression of their current status: Pt reports increased chronic pain symptoms greatly impacting mood and daily functioning.     Therapist impression of patients current state: Processed current health concerns, increased pain and how it is impacting her depression.  Processed struggles to manage pain along with personal psychosocial stressors including; family relationships and living environment.  Pt requested to bring son into session later this week to discuss improvement in interpersonal/family relationships.  Pt has conflictual relationship with many family members and it causes patient a lot of distress.  Pt feels abandonment and rejection from her her family.  Discussed emotional regulation strategies and communication skills.     Type of psychotherapeutic technique provided: Insight oriented, Client centered,  Solution-focused and CBT, DBT    Progress toward short term goals:Progress as expected, communication skills, self-care, boundaries    Review of long term goals: Date of last review 7/18/2018    Diagnosis:   1. 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 Generalized anxiety disorder    3. Chronic pain syndrome        Plan and Follow up: follow up with Brinda in 2 days (bring son with you)  Follow up with Dr. Lynn today  Practice self-care, setting boundaries and communication skills we discussed        Discharge Criteria/Planning: Patient will continue with follow-up until therapy can be discontinued without return of signs and symptoms.    Ricarda Burns 10/19/2018

## 2021-06-21 NOTE — PROGRESS NOTES
[facility] Daily Progress     Patient Name: Sandy Spencer  Date: 2018  Visit #:     Referral Diagnosis: Chronic pain,   LBP   Knee Pain  Referring provider: Michael Ramirez DO  Visit Diagnosis:     ICD-10-CM    1. Chronic pain syndrome G89.4    2. Lumbar radiculopathy M54.16    3. Left lower quadrant pain R10.32    4. Left-sided low back pain without sciatica, unspecified chronicity M54.5    5. Complex regional pain syndrome type 1 of both lower extremities G90.523    6. Acute exacerbation of chronic low back pain M54.5     G89.29          Assessment:     Patient is benefitting from skilled physical therapy and is making steady progress toward functional goals.  Patient is appropriate to continue with skilled physical therapy intervention, as indicated by initial plan of care.    Goal Status:  Pt. will demonstrate/verbalize independence in self-management of condition in : Progressing toward  Pt. will report decreased intensity, frequency of : Progressing toward  Pt. will have improved quality of sleep: Partially met  Pt. will be able to walk : Progressing toward  Patient will stand : Partially met  Patient will sit: Met  Patient will reach / maintain arm movement: Partially met    Patient will decrease : Progressing toward      Plan / Patient Education:     Continue with initial plan of care.    Subjective:     Pain Ratin-7/10  L LBP, Hip pain      Objective:     + Scan Visceral Colon,   L SI upslip,       Treatment Today     TREATMENT MINUTES COMMENTS   Evaluation     Self-care/ Home management     Manual therapy 60 MFR with OA, L5-SI and SI joint releases, Dural Tube Pull.   MFR to the Head, neck, Abdomen.  SCS to LPGT3,4,5,6,7,8,9.10.11-N.   SCS to VAGUS to heart L,   SCS to all digestive valves    Releases achieved,   Visceral motility restored to the abdominal organs.   Pt feels less cramped   Neuromuscular Re-education     Therapeutic Activity     Therapeutic Exercises     Gait  training     Modality__________________                Total 60    Blank areas are intentional and mean the treatment did not include these items.       Yogi Velazquez  11/26/2018

## 2021-06-21 NOTE — PROGRESS NOTES
ASSESSMENT/PLAN:       1. Essential hypertension  -Decreasing spironolactone to 12.5 mg orally daily, if this goes well we can consider discontinuing it.  - spironolactone (ALDACTONE) 25 MG tablet; Take 0.5 tablets (12.5 mg total) by mouth daily.  Dispense: 45 tablet; Refill: 1    2. Sinus bradycardia  -Long-standing, likely secondary to the prazosin, consider referral back to cardiology if continues to recur.    3. Anxiety and depression  -Appears to be in a better place today, continue to work with her therapist at the pain clinic as well as her pain physician who feels comfortable managing her mental health at this time.  Follow-up here in approximately 3 weeks.    4. History of shingles  -Has had several episodes of what she believes her shingles now in the last several months related to stress at her living situation.  She is doing better now, she will follow-up with things recur again.  Consider checking for herpes infection given the frequency of the outbreaks and the location.    30 minutes was spent face-to-face with the patient during this encounter and >50% of this was spent on counseling and coordination of care. We discussed in depth the topics listed above.       Caitlyn Rees MD      PROGRESS NOTE   10/29/2018    SUBJECTIVE:  Sandy Spencer is a 78 y.o. female  who presents for follow up.    Her shingles are much better. This dose has helped with the pain. The pain was so bad with the old dose.     She notes that the prazosin is causing ill effects. Her pulse was 44 last night when she woke up and felt quite off. She was sleeping 6-7 hours a night with the medication but she felt like her blood pressure was dropping. She knows taht right now she doesn't need any blood pressure medicine. She is taking two pills per night right now. She does want to finish this prescription. She has been doing this for about one week. She is taking 2-3 of the trazodone and that is helping with the sleeping  most of the time. She has had to take 4 per night at times if she has had a lot of pain. She didn't move, is working on getting out of the place where she is living. She is working on selling her condo and moving into a one bedroom apartment.     She is still not eating well, is quite fatigued as well. She does still have burning in her stomach as well. The burning is up in her epigastrium. She did have two injections this last week, one at T12 and one by her waist. The therapist says  That there is an artery behind her heart that is blocking a nerve and there is one by her ulcer and that is causing nerve pain as well.     She is working on her attitude. She has never had follow up from GI on the phone. This is frustrating to her. She does feel that this pain is the same as whenshe needed stents in the past.     She does feel much better now with the mental health aspect. She has discussed this with Dr. Lynn as well and he is going to help manage her mental health. She does not want to go on a medication right now for this.  She does continue to work with her therapist at the pain clinic as well which she feels is going well.    She worries that she is having side effects from the medications that she is using to treat her swelling.  She is wondering if she could discontinue the Spironolactone to see if this helps how she feels.  She does worry that her swelling will recur however become worse.  Chief Complaint   Patient presents with     Follow-up     Patient is here today for a two week follow up from her last visit. Patient states she is feeling better today, believes the shingles is clearing up as the pain has decreased.      Medication Management     Patient believes she is having side effects from the medication Prazosin. Her pulse decreased to 44 beats per minute, she felt dizzy and loopy.          Patient Active Problem List   Diagnosis     Chronic kidney disease (CKD) stage G3a/A1, moderately decreased  glomerular filtration rate (GFR) between 45-59 mL/min/1.73 square meter and albuminuria creatinine ratio less than 30 mg/g (H)     Focal glomerular sclerosis     Anxiety and depression     RSD lower limb, bilateral     ADD (attention deficit disorder)     RSD upper limb, right     Other specified hypothyroidism     Osteopenia     Major depression     Hypertension     Insomnia     Mixed hyperlipidemia     Right rotator cuff tear     Cluster headaches     Lumbar radiculopathy     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     Reflex sympathetic dystrophy     Acute encephalopathy     Temporomandibular joint-pain-dysfunction syndrome (TMJ)     Pancreatitis     Localized swelling of lower leg     Medical cannabis use     Acute right-sided low back pain without sciatica     Acute exacerbation of chronic low back pain     Acquired hypothyroidism     Chronic pain syndrome     Non morbid obesity due to excess calories     Snoring     Inadequate pain control     Diarrhea     Abdominal pain     Dermatochalasis of left eyelid     Bilateral carotid artery stenosis     Coronary artery disease involving native coronary artery of native heart without angina pectoris     Sinus bradycardia       Current Outpatient Prescriptions   Medication Sig Dispense Refill     acetylcysteine, bulk, Powd 600 mg capsules, take orally three times a day 42 Bottle 2     aspirin 81 MG EC tablet Take 81 mg by mouth daily.       azelastine (ASTEPRO) 0.15 % (205.5 mcg) Spry nasal spray 1 spray by Left Nare route 2 (two) times a day as needed.       cholecalciferol, vitamin D3, 5,000 unit Tab Take 1 tablet by mouth daily.       diphenhydrAMINE (BENADRYL) 25 mg capsule Take 25 mg by mouth every 6 (six) hours as needed.        fentaNYL (DURAGESIC) 50 mcg/hr Place 1 patch on the skin every third day. 10 patch 0     licorice root (LICORICE, GLYCYRRHIZA GLABRA, ORAL) Take by mouth.       lidocaine (XYLOCAINE) 5 % ointment Apply to lumbar back area three  times a day PRN pain 35.44 g 0     loperamide (IMODIUM A-D) 2 mg tablet Take 1 tablet (2 mg total) by mouth 4 (four) times a day as needed for diarrhea. 60 tablet 1     naloxone (NARCAN) 4 mg/actuation nasal spray 1 spray (4 mg dose) into one nostril for opioid reversal. Call 911. May repeat if no response in 3 minutes. 1 Box 0     OMEGA-3/DHA/EPA/FISH OIL (FISH OIL-OMEGA-3 FATTY ACIDS) 300-1,000 mg capsule Take 1.2 g by mouth 2 (two) times a day.       prazosin (MINIPRESS) 1 MG capsule One bedtime for 5 nights, may increase to two bedtime for five nights, if needed three bedtime 60 capsule 2     psyllium (METAMUCIL) 3.4 gram packet Take 1 packet by mouth 2 (two) times a day.  0     rosuvastatin (CRESTOR) 40 MG tablet Take 1 tablet (40 mg total) by mouth daily. 90 tablet 3     spironolactone (ALDACTONE) 25 MG tablet Take 0.5 tablets (12.5 mg total) by mouth daily. 45 tablet 1     SUMAtriptan (IMITREX) 50 MG tablet Take 1 tablet (50 mg total) by mouth every 2 (two) hours as needed for migraine. 27 tablet 1     topiramate (TOPAMAX) 50 MG tablet Take 1 tablet (50 mg total) by mouth 2 (two) times a day. 180 tablet 1     traZODone (DESYREL) 100 MG tablet TAKE 2 TO 4 TABLETS(200  MG) BY MOUTH AT BEDTIME 360 tablet 1     UNABLE TO FIND Med Name: DGL PLUS 760 mg deglycyrrhizinated licorice plus 100 mg glycine.       omeprazole (PRILOSEC) 40 MG capsule Take 1 capsule (40 mg total) by mouth daily. 30 capsule 3     valACYclovir (VALTREX) 1000 MG tablet Take 1 tablet (1,000 mg total) by mouth 3 (three) times a day. 30 tablet 1     Current Facility-Administered Medications   Medication Dose Route Frequency Provider Last Rate Last Dose     denosumab 60 mg (PROLIA 60 mg/ml)  60 mg Subcutaneous Q6 Months Andrei Olivera MD   60 mg at 08/13/18 1126       History   Smoking Status     Former Smoker     Packs/day: 1.00     Years: 20.00     Quit date: 1/1/2000   Smokeless Tobacco     Never Used           OBJECTIVE:        No  results found for this or any previous visit (from the past 240 hour(s)).    Vitals:    10/29/18 1120   BP: 110/56   Patient Site: Left Arm   Patient Position: Sitting   Cuff Size: Adult Regular   Pulse: 92   SpO2: 98%   Weight: 142 lb 4.8 oz (64.5 kg)     Weight: 142 lb 4.8 oz (64.5 kg)        Physical Exam:  GENERAL APPEARANCE: A&A, NAD, well hydrated, well nourished  SKIN:  Normal skin turgor, no lesions/rashes healing shingles lesions on her buttock  HEENT: moist mucous membranes, no rhinorrhea  NECK: Normal  CV: RRR, no M/G/R   LUNGS: CTAB   EXTREMITY: no edema   NEURO: no gross deficits   Psych: Her affect is brighter than it has been, her thought process and speech pattern are normal, she is still anxious appearing

## 2021-06-21 NOTE — PROGRESS NOTES
Outpatient Mental Health Treatment Plan    Name:  Sandy Spencer  :  1940  MRN:  [id number]    Treatment Plan:  updated Treatment Plan  Intake/initial treatment plan date:  2018  Benefit and risks and alternatives have been discussed: Yes  Is this treatment appropriate with minimal intrusion/restrictions: Yes  Estimated duration of treatment:  Approximately 26+ sessions.  Anticipated frequency of services:  Every2  weeks  Necessity for frequency: This frequency is needed to establish therapeutic goals and for continuity of care in order to monitor progress.  Necessity for treatment: To address cognitive, behavioral, and/or emotional barriers in order to work toward goals and to improve quality of life.     Plan:                                                                                                                                          ?                        ? Anxiety/PTSD                                             Goal:              Decrease average anxiety level from 15 to 5 or below.                        Strategies:                 ? [x]Learn and practice relaxation techniques and other coping  strategies (e.g., thought stopping, reframing, meditation)                                                                    ? [x] Increase involvement in meaningful activities                                                                    ? [x] Discuss sleep hygiene                                                                    ? [x] Explore thoughts and expectations about self and others                                                                    ? [x] Identify and monitor triggers for panic/anxiety symptoms                                                                    ? [x] Implement physical activity routine (with physician approval)                                                                    ? [x] Consider introduction of bibliotherapy and/or videos                                                                     ? [x] Continue compliance with medical treatment plan (or explore barriers)                                                                                         [x] Understanding PTSD, causes, symptoms and treatment                                                                    -  EMDR education & possible therapy strategy                                                                    - Communication skills                                                                    - Stress management techniques                                                                    - increase support system     ?Degree to which this is a problem: 4  Degree to which goal is met: 1  Date of Review: 10/18/2018                             ? Depression                                     Goal:              Decrease average depression level from 18 to 5 or below.                        Strategies:                 ?[x] Decrease social isolation                                                                    [x] Increase involvement in meaningful activities                                                                    ?[x] Discuss sleep hygiene                                                                    ?[x] Explore thoughts and expectations about self and others                                                                    ?[x] Process grief (loss of significant person, independence, role,etc.)                                                                    ?[x] Assess for suicide risk                                                                    ?[x] Implement physical activity routine (with physician approval)                                                                    [x] Consider introduction of bibliotherapy and/or videos                                                                    [x] Continue compliance with medical treatment  plan (or explore  barriers)                                                                       ?     Degree to which this is a problem: 4  Degree to which goal is met: 1  Date of Review: 10/18/2018     Chronic pain  Goal:              ? Decrease average pain level from 9/10 to 6/10.                        ? Increase % acceptance of pain from 40%  to 60%  .                        Strategies:                 ? [x] Explore thoughts and expectations about self and others                                                                                                                              [x] Explore emotional reactions to illness/injury                                                                     ? [x] Learn and practice relaxation techniques and other coping  strategies                                                                           [x] Implement physical activity routine (with physician approval)                                                                    ? [x] Engage in values clarification and goal-setting                                                                    ? [x] Consider introduction of bibliotherapy and/or videos                                                                    ? [x] Increase involvement in meaningful activities                                                                    ? [x] Discuss sleep hygiene                                                                    ? [x] Process grief (loss of significant person, independence, role, etc.)                                                                    ? [x] Assess for suicide risk                                          ?     Degree to which this is a problem: 4  Degree to which goal is met: 1  Date of Review: 10/18/2018      Functional Impairment:  1=Not at all/Rarely  2=Some days  3=Most Days  4=Every Day    Personal : 4  Family : 4  Social : 4   Work/school : NA     Diagnosis:  Major  Depression, recurrent, severe  Generalized Anxiety Disorder  PTSD  Chronic Pain syndrome     WHODAS: 41% H1: 20, H2: 3, H3: 15           Clinical assessments and measures completed:. KT-7, PHQ-9, CAGE-AID and PANSI      Strengths:  Intelligent, motivated, honest, open  Limitations:  lack of support system, chronic pain  Cultural Considerations: pt is a 78 year old,  female with hx of chronic pain     Persons responsible for this plan: Patient and Provider                   Psychotherapist Signature           Patient Signature:              Guardian Signature             Provider: Performed and documented by ARY Hector   Date:  10/18/2018

## 2021-06-22 ENCOUNTER — COMMUNICATION - HEALTHEAST (OUTPATIENT)
Dept: FAMILY MEDICINE | Facility: CLINIC | Age: 81
End: 2021-06-22

## 2021-06-22 ENCOUNTER — COMMUNICATION - HEALTHEAST (OUTPATIENT)
Dept: CARDIOLOGY | Facility: CLINIC | Age: 81
End: 2021-06-22

## 2021-06-22 DIAGNOSIS — E78.2 MIXED HYPERLIPIDEMIA: ICD-10-CM

## 2021-06-22 NOTE — TELEPHONE ENCOUNTER
"Patient calls and states, \"I missed a call from Dr. Lynn\"  Remainder of the message was garbled, not able to understand.    "

## 2021-06-22 NOTE — PROGRESS NOTES
[facility] Daily Progress     Patient Name: Sandy Spencer  Date: 12/24/2018  Visit #: 1/12 + 24    Referral Diagnosis: [unfilled]  Referring provider: Michael Ramirez DO  Visit Diagnosis:     ICD-10-CM    1. Chronic pain syndrome G89.4    2. 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 M48.07    3. Complex regional pain syndrome type 1 of both lower extremities G90.523    4. Lumbar radiculopathy M54.16    5. Left-sided low back pain without sciatica, unspecified chronicity M54.5    6. Acute right-sided low back pain without sciatica M54.5          Assessment:     Patient demonstrates understanding/independence with home program.  Patient is benefitting from skilled physical therapy and is making steady progress toward functional goals.  Patient is appropriate to continue with skilled physical therapy intervention, as indicated by initial plan of care.    Goal Status    :Pt. will demonstrate/verbalize independence in self-management of condition in : 12 weeks     Progressing  Pt. will report decreased intensity, frequency of : Pain;in 12 weeks;Comment  Comment:: decrease pain to 2-7/10 with ADLs      Partially Met  Pt. will have improved quality of sleep: waking less times/night;getting 75-90% of required amount;in 12 weeks;Comment  Comment:: increase sleep to 4-6 hours      Partially Met    3 hrs  Pt. will be able to walk : 30 minutes;on even surfaces;with less difficulty;for household mobility;for community mobility;for exercise/recreation;in 12 weeks     Not Met     2-3 blocks before pain elevates  Patient will stand : 30 minutes;with less difficultty;for home chores;in 12 weeks     Not Met   10-15 min   Patient will sit: 30 minutes;for driving;for eating;for watching TV;with less difficultty;in 12 weeks    Met  Patient will reach / maintain arm movement: overhead;for home chores;for dressing;with less difficulty;in 12 weeks    Patially Met  Patient will decrease : TIMA score;by _ points;for  improved quality of function;for improved quality of life;in 12 weeks  by ___ points: 15  No Data Recorded  No Data Recorded    Plan / Patient Education:     Continue with initial plan of care.    Subjective:     Pain Ratin  Goig off of the opioids      Objective:     + Scan to lgamentous of the pelvis and LE's    Treatment Today     TREATMENT MINUTES COMMENTS   Evaluation     Self-care/ Home management     Manual therapy 60 SCS to TUBAB-P R   TUBAD-P R,   SCS to TUBAB-P L,   Sigmoid Med, Lat-V   SCS to SGL-N L,   FVAR-P l,  TVAR-P   FVAR-P R,   FVAL-P L,   FEMF-P N   FEME-P B  FEMETR-P,   PFIM-MS,  PATF-P      Releases to the Ligaments and the Pereostium of the Pelvis, Femurs and R Tibia-Fibulas Achieved.   Pain to the Pelvis and LE's decreasing    Pt gains the greates pain relief with the Pereostial and ligamentous releases to the Pelvis and Lower extremities.   Kinesiotaping of the lower extremities especially the knees proves beneficially for 3-4 days of pain relief and improed wt. Bearing and gait   Neuromuscular Re-education     Therapeutic Activity     Therapeutic Exercises     Gait training     Modality__________________                Total 60    Blank areas are intentional and mean the treatment did not include these items.       Yogi Velazquez  2018

## 2021-06-22 NOTE — TELEPHONE ENCOUNTER
Patient Returning Call  Reason for call:  Patient is returning a call.   Information relayed to patient:  Below message and lab values.  Patient has additional questions:  Yes  If YES, what are your questions/concerns:  Patient is asking if her UA has come back no UA found. Patient is asking what are her next steps, please reach out to patient and advise.   Okay to leave a detailed message?: Yes [phone]

## 2021-06-22 NOTE — PROGRESS NOTES
Mental Health Visit Note    12/27/2018   Start time: 1100    Stop Time: 1150   Session # 15    Session Type: Patient is presenting for an Individual session.    Sandy Spencer is a 78 y.o. female is being seen today for    Chief Complaint   Patient presents with     Depression   .     New symptoms or complaints: None    Functional Impairment:   Personal: 4  Family: 4  Work: NA  Social:4    Clinical assessment of mental status: Patient presented alert and oriented x3.  She was well-groomed.  Her attire was appropriate.  Patient was cooperative.  Patient motor actively was normal and eye contact appropriate.  Patients mood was depressed and affect tearful.  Patient s speech and language was normal.  Patient attention and thought process normal.  Concentration was appropriate.  Patient denied delusions or hallucinations. Patient denied suicidal or homicidal ideation.  Patient denied any long or short term memory problems. No evidence of impairment in judgment.  She has insight and fund of knowledge was adequate.        Suicidal/Homicidal Ideation present: None Reported This Session    Patient's impression of their current status: Pt reports symptoms of depression, anxiety & chronic pain that impacts daily functioning.     Therapist impression of patients current state: Pt processed current psychosocial stressors including; family conflict, health issues, chronic pain, financial and living situation.  Discussed patients current coping strategies and additional coping skills.  Worked on cognitive restructuring strategies and emotional regulation techniques.      Type of psychotherapeutic technique provided: Insight oriented, Client centered, Solution-focused, CBT and DBT    Progress toward short term goals:Progress as expected, self-care, emotional regulation, cognitive restructuring    Review of long term goals: Date of last review 10/18/2018    Diagnosis:   1. Severe recurrent major depression without psychotic  features (H)    2. Generalized anxiety disorder    3. Chronic pain syndrome        Plan and Follow up: Practice self-care and coping strategies as discussed  Follow up with all providers as scheduled  Follow up with Brinda in 2 weeks      Discharge Criteria/Planning: Patient will continue with follow-up until therapy can be discontinued without return of signs and symptoms.    Ricarda Burns 1/7/2019

## 2021-06-22 NOTE — TELEPHONE ENCOUNTER
Pt notified. She reports that she will wait until her F/u appt - she is no longer having the tingling symptoms when urinating. She was reminded to come in sooner if her symptoms come back or get worse.   H.DeGree, CMA

## 2021-06-22 NOTE — ANESTHESIA POSTPROCEDURE EVALUATION
Patient: Sandy Spencer  ENDOSCOPIC ULTRASOUND  Anesthesia type: MAC    Patient location: Phase II Recovery  Last vitals:   Vitals:    12/14/18 0915   BP: 122/59   Pulse: 85   Resp: 16   Temp:    SpO2: 96%     Post vital signs: stable  Level of consciousness: awake and responds to simple questions  Post-anesthesia pain: pain controlled  Post-anesthesia nausea and vomiting: no  Pulmonary: unassisted, return to baseline  Cardiovascular: stable and blood pressure at baseline  Hydration: adequate  Anesthetic events: no    QCDR Measures:  ASA# 11 - Aracelis-op Cardiac Arrest: ASA11B - Patient did NOT experience unanticipated cardiac arrest  ASA# 12 - Aracelis-op Mortality Rate: ASA12B - Patient did NOT die  ASA# 13 - PACU Re-Intubation Rate: ASA13B - Patient did NOT require a new airway mgmt  ASA# 10 - Composite Anes Safety: ASA10A - No serious adverse event    Additional Notes:

## 2021-06-22 NOTE — PROGRESS NOTES
[facility] Daily Progress     Patient Name: Sandy Spencer  Date: 12/3/2018  Visit #:     Referral Diagnosis: LBP,  Abdominal Pain,   Leg Pain  Referring provider: Michael Ramirez DO  Visit Diagnosis:     ICD-10-CM    1. Chronic pain syndrome G89.4    2. Lumbar radiculopathy M54.16    3. Complex regional pain syndrome type 1 of both lower extremities G90.523    4. Left-sided low back pain without sciatica, unspecified chronicity M54.5    5. Left upper quadrant pain R10.12          Assessment:     Patient demonstrates understanding/independence with home program.  Patient is benefitting from skilled physical therapy and is making steady progress toward functional goals.  Patient is appropriate to continue with skilled physical therapy intervention, as indicated by initial plan of care.    Goal Status:  Pt. will demonstrate/verbalize independence in self-management of condition in : Progressing toward  Pt. will report decreased intensity, frequency of : Progressing toward  Pt. will have improved quality of sleep: Partially met  Pt. will be able to walk : Progressing toward  Patient will stand : Partially met  Patient will sit: Met  Patient will reach / maintain arm movement: Partially met    Patient will decrease : Progressing toward      Plan / Patient Education:     Continue with initial plan of care.    Subjective:     Pain Ratin/10 to L abdomen and Leg        Objective:     Bilat SI Tension,      4 sec,  + Sxcan  R Mid Abdomen,  LIG Pelvis, Sacrum.   Periosteum  Pelvis    Treatment Today     TREATMENT MINUTES COMMENTS   Evaluation     Self-care/ Home management     Manual therapy     Neuromuscular Re-education     Therapeutic Activity     Therapeutic Exercises     Gait training     Modality__________________ 60 MFR with OA, L5-SI and SI joint releases, Dural Tube Pull,  MFR to the Neck and Head.   SCS to FVAR-P,   TUBAB-P,   FEMF-P,   FEMET-P,   LLSG-A   PFIM/PFIL-MS    Pain to the LE's  decreased 2/10    Pt able to walk more comfortably              Total 60    Blank areas are intentional and mean the treatment did not include these items.       Yogi Velazquez  12/3/2018

## 2021-06-22 NOTE — TELEPHONE ENCOUNTER
Left message to call back for: Sandy  Information to relay to patient:  See message from Dr. Rees.  Felicia PEÑA CMA

## 2021-06-22 NOTE — TELEPHONE ENCOUNTER
Patient had called yesterday requesting to increase the fentanyl patch as there is pain.  I did leave a message with the patient as she is called to decrease fentanyl after having GI appointments concerned with the constipation and gastroparesis likely.  She did call again today asking to increase the dose.  I called her back again getting the message machine.  I do note Dr. Rees sent in a prescription today for fentanyl 50 mcg #5

## 2021-06-22 NOTE — PROGRESS NOTES
[facility] Daily Progress     Patient Name: Sandy Spencer  Date: 12/10/2018  Visit #:     Referral Diagnosis: Low Back and LE Pain  Referring provider: Michael Ramirez DO  Visit Diagnosis:     ICD-10-CM    1. Chronic pain syndrome G89.4    2. Acute right-sided low back pain without sciatica M54.5    3. 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 M48.07    4. Reflex sympathetic dystrophy G90.50    5. Localized swelling of lower leg R22.40    6. Complex regional pain syndrome type 1 of both lower extremities G90.523          Assessment:     Patient demonstrates understanding/independence with home program.  Patient is benefitting from skilled physical therapy and is making steady progress toward functional goals.  Patient is appropriate to continue with skilled physical therapy intervention, as indicated by initial plan of care.    Goal Status:  Pt. will demonstrate/verbalize independence in self-management of condition in : Progressing toward  Pt. will report decreased intensity, frequency of : Progressing toward  Pt. will have improved quality of sleep: Partially met  Pt. will be able to walk : Progressing toward  Patient will stand : Partially met  Patient will sit: Met  Patient will reach / maintain arm movement: Partially met    Patient will decrease : Progressing toward      Plan / Patient Education:     Continue with initial plan of care.    Subjective:     Pain Ratino to L LE  R LE 8/10.   LBP 4-5/10      Objective:     + Scan to Lig Pelvis , Sacrum and femurs    Treatment Today     TREATMENT MINUTES COMMENTS   Evaluation     Self-care/ Home management     Manual therapy 60 SCS toSACF-P   FST-P B,   BST-P B,   SSS-P L,   ISS-P R,   IOLIF-P B,  ILIF-P B, ISCHO-P B,   R PSOAS,   TUBAD-P R,   TUBAB-P R,   FVAR-P R,   FEMF-P R,   FEMIT-P R,        Releases achieved,   R LE not as cold per pt.   R Knee burning is less   Neuromuscular Re-education     Therapeutic Activity      Therapeutic Exercises     Gait training     Modality__________________                Total 60    Blank areas are intentional and mean the treatment did not include these items.       Yogi Velazquez  12/10/2018

## 2021-06-22 NOTE — TELEPHONE ENCOUNTER
Patient's original message/refill request:message left for refill of fentanyl patches 25 mcg    Last appointment:11/23/18 with Dr. Lynn  Next appointment:1/11/19 with Dr. Lynn  Opioid last filled:med due 1/13/19 for #10  Per    12/26/2018 1 12/26/2018 Fentanyl 25 Mcg/Hr Patch 5 15 Ja And [id number] [facility] ([id number]) 0 60.00 [place]   12/14/2018 1 12/13/2018 Fentanyl 25 Mcg/Hr Patch 5 15 Br [place] [id number] [facility] ([phone]) 0 60.00 [place]    :    This refill is in compliance with the last dictation/plan of care.   Med queued up and sent to Dr. Lynn

## 2021-06-22 NOTE — TELEPHONE ENCOUNTER
No UA ordered at time of visit, should we have her come in to leave a sample?     Also recommendation for moving forward in regards to results.

## 2021-06-22 NOTE — TELEPHONE ENCOUNTER
Patient left tearful message this afternoon. Wants to increase Fentanyl to 50 mcg, because having so much more pain. Patient had been scheduled for knee injections tomorrow, but she had cancelled. Now rescheduled for 1/9/19.    I called patient back. She states is waiting to hear about getting injection approved for tomorrow. She has new insurance. She is having so much pain in both knees and both hips. Was supposed to have an injection in knees last week, but xray machine not working. Had one Fentanyl 50 mcg left and applied that today, has not been sleeping more than 3 hrs. I told her would review with Dr Lynn, but that she was the one that wanted to decrease med, and expressed wanting to get off of it. Patient has 2 Fentanyl 25 mcg patches left.

## 2021-06-22 NOTE — PROGRESS NOTES
[facility] Daily Progress   [facility] Discharge Summary  Patient Name: Sandy Spencer  Date: 2/20/2019  Referral Diagnosis: [unfilled]  Referring provider: Michael Ramirez DO  Visit Diagnosis:   1. Chronic pain syndrome     2. 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     3. Complex regional pain syndrome type 1 of both lower extremities     4. Lumbar radiculopathy     5. Localized swelling of lower leg     6. Acute right-sided low back pain without sciatica         Goals    :Pt. will demonstrate/verbalize independence in self-management of condition in : 12 weeks     Met  Pt. will report decreased intensity, frequency of : Pain;in 12 weeks;Comment      Partially met with fluctuations in pain levels  Comment:: decrease pain to 2-7/10 with ADLs  Pt. will have improved quality of sleep: waking less times/night;getting 75-90% of required amount;in 12 weeks;Comment   Not Met  Comment:: increase sleep to 4-6 hours  Pt. will be able to walk : 30 minutes;on even surfaces;with less difficulty;for household mobility;for community mobility;for exercise/recreation;in 12 weeks   Not met  Patient will stand : 30 minutes;with less difficultty;for home chores;in 12 weeks     Partially met 15 min  Patient will sit: 30 minutes;for driving;for eating;for watching TV;with less difficultty;in 12 weeks     Met  Patient will reach / maintain arm movement: overhead;for home chores;for dressing;with less difficulty;in 12 weeks      Progressing  Patient will decrease : TIMA score;by _ points;for improved quality of function;for improved quality of life;in 12 weeks     Not able to score due to the pt's hospitalization  by ___ points: 15      No Data Recorded  No Data Recorded    Patient was seen for 26 visits from 7/13/18 to 1/7/1`9 with 2 missed appointments.  The patient attended therapy initially, but did not finish the therapy sessions prescribed.  Goals were not fully achieved. Explanation for goals  not achieved: Pt was able to reduce her Chronic pain but with varible results as pain would fluctuate especially pain to the knees.   Pt developed abdominal and back pain and was eventually hopitalizied with kidney dysfunction which necessitated surgery    Therapy will be discontinued at this time.  The patient will need a new referral to resume.    Thank you for your referral.  Yogi Velazquez  2019  10:30 AM  Patient Name: Sandy Spencer  Date: 2019  Visit #: 3/12 + 22    Referral Diagnosis: Chronic Pain    LBP  Referring provider: Michael Ramirez DO  Visit Diagnosis:     ICD-10-CM    1. Chronic pain syndrome G89.4    2. 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 M48.07    3. Complex regional pain syndrome type 1 of both lower extremities G90.523    4. Lumbar radiculopathy M54.16    5. Localized swelling of lower leg R22.40    6. Acute right-sided low back pain without sciatica M54.5          Assessment:     Patient is benefitting from skilled physical therapy and is making steady progress toward functional goals.  Patient is appropriate to continue with skilled physical therapy intervention, as indicated by initial plan of care.    Goal Status:  No Data Recorded  No Data Recorded    Plan / Patient Education:     Continue with initial plan of care.    Subjective:     Pain Ratin  When wearing the tape Knee pain reduced 4/10 the day after taping      Objective:     Bilat Knee Pain   + Scan  Ligamentous Knees    Treatment Today     TREATMENT MINUTES COMMENTS   Evaluation     Self-care/ Home management     Manual therapy 60 KTing of bilat knees,   SCSPATF-P,   POFM-P,   PFOL-P,   PFIM-P,   PFIL-P,   FETME-P,   FEMIT-P,  FEME-P.   FEME-P,   FVAL-P,   TUBAB-P,   TUBAD-P,      Releases achieved,  Knee Pain reduced 6/10 to 2-3/10   Pt to observe effects of treatment on her stair walking   Neuromuscular Re-education     Therapeutic Activity     Therapeutic Exercises     Gait training      Modality__________________                Total 60    Blank areas are intentional and mean the treatment did not include these items.       Yogi Velazquez  2/20/2019

## 2021-06-22 NOTE — TELEPHONE ENCOUNTER
----- Message from Caitlyn Rees MD sent at 1/7/2019  2:09 PM CST -----  Please call the patient and let her know that all of her xrays were normal which is great news. She does have some minimal arthritis in the left hip.     Please let me know if things don't get better.

## 2021-06-22 NOTE — TELEPHONE ENCOUNTER
I'm sorry, with all the stuff that we talked about last week this must have been forgotten. I have an order in that she can come in and leave a sample tomorrow (Tuesday).

## 2021-06-22 NOTE — PROGRESS NOTES
[facility] Daily Progress     Patient Name: Sandy Spencer  Date: 2018  Visit #:    Referral Diagnosis: Lower extremity pain    Referring provider: Michael Ramirez DO  Visit Diagnosis:     ICD-10-CM    1. Chronic pain syndrome G89.4    2. 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 M48.07    3. Complex regional pain syndrome type 1 of both lower extremities G90.523    4. Lumbar radiculopathy M54.16    5. Acute right-sided low back pain without sciatica M54.5    6. Left-sided low back pain without sciatica, unspecified chronicity M54.5    7. Left lower quadrant pain R10.32    8. Chronic cluster headache, not intractable G44.029          Assessment:     Patient demonstrates understanding/independence with home program.  Patient is benefitting from skilled physical therapy and is making steady progress toward functional goals.  Patient is appropriate to continue with skilled physical therapy intervention, as indicated by initial plan of care.    Goal Status:  Pt. will demonstrate/verbalize independence in self-management of condition in : Progressing toward  Pt. will report decreased intensity, frequency of : Progressing toward  Pt. will have improved quality of sleep: Partially met  Pt. will be able to walk : Progressing toward  Patient will stand : Partially met  Patient will sit: Met  Patient will reach / maintain arm movement: Partially met    Patient will decrease : Progressing toward      Plan / Patient Education:     Continue with initial plan of care.    Subjective:     Pain Ratin  Pain is less since going and having the endoscopy last week      Objective:     R SI upslip.      Treatment Today     TREATMENT MINUTES COMMENTS   Evaluation     Self-care/ Home management     Manual therapy 60 MFR with OA, L5-SI and SI joint releases, Dural Tube Pull.   SCS to PGT10,11,12, L1-N L.    SCS to L Renal Vein and Artery.  L FEME-P,   L FEMIT,  FEMET-P,   SCS to PFOM-MS,   PATF-P,    JIMENEZ-MS bilat   KTing of the Knees Quads and Hamstrings    Pain reduced to the hips, knees  5/10 to 2/10   Pt able to stand and walk more comfortably   Neuromuscular Re-education     Therapeutic Activity     Therapeutic Exercises     Gait training     Modality__________________                Total 60    Blank areas are intentional and mean the treatment did not include these items.       Yogi Velazquez  12/17/2018
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
## 2021-06-22 NOTE — PATIENT INSTRUCTIONS - HE
POST PROCEDURE INSTRUCTIONS      PAIN CENTER  PROCEDURE DONE TODAY: BILATERAL KNEE INJECTION WITH SYNVISC #1  Rest today. Resume activity tomorrow as able.  Patient demonstrates the ability to ambulate independently with a steady gait at the time of discharge.      It is not unusual to have a Temporary increase in your pain after a procedure.  You can use ice to the area 24-48 hrs for 15 minutes at a time.    You did not receive a steroid.    Low back or SI joint(sacroiliac) injection:  You may experience numbness in one or both legs.  Please walk with help.  This is temporary and will wear off in a few hours. Do not drive if you have tingling, numbness or weakness in your hands/arms or legs/feet.    Fever : call if fever 100 or over, redness/warmth/swelling over injections site.    No public hot tubs/pools for a couple days.    Physical therapy:  Check with Pain Center provider regarding resuming therapy.    Monitor you response to the injection and report this at your next appointment.    If you have been referred for a procedure only, please call your referring provider for a follow up.    Radio Frequency: may take up to 6 weeks before you will feel the full effects of this procedure.    CALL IF ANY QUESTIONS [phone]

## 2021-06-22 NOTE — PROGRESS NOTES
[facility] Daily Progress     Patient Name: Sandy Spencer  Date: 2018  Visit #:     Referral Diagnosis: Chronic Pain    LBP  Referring provider: Michael Ramirez DO  Visit Diagnosis:     ICD-10-CM    1. Chronic pain syndrome G89.4    2. 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 M48.07    3. Complex regional pain syndrome type 1 of both lower extremities G90.523    4. Lumbar radiculopathy M54.16    5. Complex regional pain syndrome type 1 of right upper extremity G90.511    6. Cervical radiculitis M54.12    7. Thoracic spine pain M54.6          Assessment:     Patient is benefitting from skilled physical therapy and is making steady progress toward functional goals.  Patient is appropriate to continue with skilled physical therapy intervention, as indicated by initial plan of care.    Goal Status:  No Data Recorded  No Data Recorded    Plan / Patient Education:     Continue with initial plan of care.    Subjective:     Pain Ratin  Topical cream that the DR prescribed is not working      Objective:     Lig and neuro TP's    Treatment Today     TREATMENT MINUTES COMMENTS   Evaluation     Self-care/ Home management     Manual therapy 60 MFR with OA, L5-SI and SI joint releases, Dural Tube Pull,    SCS to L    ILLI-N,  HYPT-N    PG L S2,3-N,   L  PL1,2,3-N   KTing of bilat Knees, quads.   SCS to B ACE,   ANT L-5    SCS to Renal artery and Vein L    SI Dysf resolved,   Pain to the LB reduced 7/10  To 2/10   PT more comfortable in gait and standing   Neuromuscular Re-education     Therapeutic Activity     Therapeutic Exercises     Gait training     Modality__________________                Total 60    Blank areas are intentional and mean the treatment did not include these items.       Yogi Velazquez  2018

## 2021-06-22 NOTE — TELEPHONE ENCOUNTER
Dr. Lynn,    I did attempted to reach your office to request via phone but was directed to leaving a voicemail.  Patient was in to clinic today to see Dr. Rees for a follow up appointment. She expressed her concerned that she has been unable to reach your office or hear back from your office in regards to a refill request for her Fentanyl patches.     Patient is requesting a refill of the Fentanyl patches, wonders if she can get 50 mg patches instead due to her amount of pain. Last fill was on  12/26/18 25 mcg patches, 5 patches total.       Patient is completely out of patches and stated she will not go to the ER this weekend due to the pain and is worried what symptoms she will have without the patches.     Please contact patient in regards to request and if patches can be filled.

## 2021-06-22 NOTE — ANESTHESIA PREPROCEDURE EVALUATION
"Anesthesia Evaluation      Patient summary reviewed   History of anesthetic complications (PONV. slow to wake up. \"HR=45 BPM\" during recovery)     Airway   Mallampati: II   Pulmonary - negative ROS and normal exam                          Cardiovascular - normal exam  Exercise tolerance: > or = 4 METS  (+) hypertension, CAD, , hypercholesterolemia, PVD (s/p L CEA)    ECG reviewed (NSR low voltage)  Rhythm: regular  Rate: normal,      ROS comment: 12/16 TTE    When compared to the previous study dated 2/22/2016, no significant change.    Left Ventricle: Normal size and systolic function.The estimated left ventricular ejection fraction is 65%. This represents a normal ejection fraction. The left ventricular wall thickness is normal. E/e' > 15, suggesting elevated LV filling pressures.    Left Atrium: Left atrial volume is mildly increased.    Tricuspid Valve: Mild tricuspid valve regurgitation. No pulmonary hypertension present. The estimated systolic pulmonary pressure is 28 mmhg.     Neuro/Psych    (+) neuromuscular disease (RSD; right and left lower extremity. left arm),  CVA , chronic pain (uses medical marijuana. Fentanyl patch)    Endo/Other    (+) hypothyroidism,      GI/Hepatic/Renal    (+)   chronic renal disease CRI,     Comments: History of bowel resection     Other findings: Results for TACO JERRY (MRN [id number]) as of 12/14/2018 07:04    12/4/2018 11:31  Sodium: 140  Potassium: 3.7  Chloride: 111 (H)  CO2: 19 (L)  Anion Gap, Calculation: 10  BUN: 16  Creatinine: 0.85  GFR MDRD Af Amer: >60  GFR MDRD Non Af Amer: >60  Calcium: 8.9  Glucose: 90  WBC: 6.3  RBC: 3.86  Hemoglobin: 12.1  Hematocrit: 37.2  MCV: 96  MCH: 31.4  MCHC: 32.5  RDW: 12.9  Platelets: 195        Dental - normal exam                        Anesthesia Plan  Planned anesthetic: MAC  Versed/fent  propofol ggt  Decadron/zofran  Patient understands this is a sedation anesthetic and she might recall some OR events  Keep fentanyl patch " on  Consider Ketamine 25mg (balance of chronic pain/multiple sites, and history of slow wake up)  ASA 3     Anesthetic plan and risks discussed with: patient    Post-op plan: routine recovery

## 2021-06-22 NOTE — TELEPHONE ENCOUNTER
----- Message from Caitlyn Rees MD sent at 1/7/2019  2:10 PM CST -----  Please let the patient know that her arthritis looks stable or moderate. It's not obviously progressing
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
## 2021-06-22 NOTE — PATIENT INSTRUCTIONS - HE
1. Try neoprene knee braces for your knees    2. Continue to see your doctor for your nausea and weight loss    3. Hold off on PT for another week.

## 2021-06-22 NOTE — PROGRESS NOTES
ASSESSMENT/PLAN:       1. Moderate episode of recurrent major depressive disorder (H)  -The patient's mood has been worsening over the last several months, and she is very tearful today.  I initially recommended mental health referral several months ago but at the time she felt overwhelmed by multiple doctor appointments and was getting her mental health evaluation through the pain clinic.  At this time she is now feeling that this is may be not enough for her and they tend to agree and she will be referred to mental health again for medication management as well as therapy.  She will follow-up here in 2 weeks that she is really fairly anxious and depressed today.  - Ambulatory referral to Psychology    2. Diffuse arthralgia  -She continues to have significant arthralgias and they are worsening over the last several months as well.  Updating basic labs as listed below, she will continue with the pain clinic for now and we should consider may be a rheumatology referral as well for further evaluation and assessment.  - Erythrocyte Sedimentation Rate  - HM1(CBC and Differential)  - C-Reactive Protein  - Rheumatoid Factor Quant  - Antinuclear Antibodies Screen (CINDY)  - Vitamin D, Total (25-Hydroxy)  - HM1 (CBC with Diff)    3. Left elbow pain  -Persistent pain, updating x-ray today.  X-rays unremarkable today as well and I suspect that this is again still bony bruising from her fall.  If her symptoms persist consider referral to orthopedics.  - XR Elbow Left 2 VWS; Future    4. Chronic pain of both knees  -Known arthritis, updating x-ray today to make sure there was no further damage done and it does not appear that there was.  She does have a knee injection scheduled for next week.  - XR Knees Bilateral 1 Or 2 VWS; Future    5. Hip pain, left  -Again worsened since her fall in November, updating x-ray today to me looks really pretty normal.  - XR Hip Left 2 or More VWS; Future    6. Chronic kidney disease, stage III  (moderate) (H)   -Long-standing, updating vitamin D level today given her increased arthralgias.  - Vitamin D, Total (25-Hydroxy)    7. Acquired hypothyroidism  -Given her increased arthralgias checking a T3 today.  - T3 (Triiodothyronine), Free    8. Osteopenia of multiple sites  -Overdue for bone density scan, ordered today  - DXA Bone Density Scan; Future    9. RSD lower limb, bilateral  -The patient has been on long-term opioid therapy for quite some time.  Most recently she requested trying a decrease in her dosage from 50 mcg of the fentanyl patch to 25 mcg of fentanyl patch.  She has been in contact with the pain clinic stating that this dosage has not been working and she was hoping to go back up to the 50 mcg dosage.  Unfortunately she has not heard back from them after 2 phone calls and she is now going to run out of her fentanyl patch as of tomorrow she will only have one 25 mcg patch left.  Given this I did give her a small prescription until she can see her pain clinic physician next week as it is now Friday afternoon at 4:45 PM.  -We discussed that this may mean she gets fired from the pain clinic, and she expresses understanding of this but does not want to get stuck with increased pain over the weekend.  -She will see me in 2 weeks.      No Follow-up on file.    >60 minutes was spent face-to-face with the patient during this encounter and >50% of this was spent on counseling and coordination of care. We discussed in depth the topics listed above.       Caitlyn Rees MD      PROGRESS NOTE   1/4/2019    SUBJECTIVE:  Sandy Spencer is a 78 y.o. female  who presents for follow up.     The patient is coming in today for follow-up from her last visit where we addressed some depressive type symptoms as well as increasing pain.  Upon my entering the room she is immediately in tears, and continues to be throughout the entire visit.  She continues to feel that her pain is unmanageable.  She  recently tried having her fentanyl patch decreased from 50 mcg to 25 mcg and that did not go well and she felt that her pain significantly increased.  She had 150 mcg patch left which she used and then she did use 2 of the 25 mcg patches and is currently waiting to hear back from the pain clinic as she is going to run out over the weekend.    She describes the pain is really burning in her joints as well as in her bones in general.  At this point she is not sure if this is RSD anymore or if it is related to her osteopenia or some other bone malignancy or bone problem.  She only walks between 250-400 steps a day according to her Fitbit and at that point she feels like her joints are in severe pain again.  She unfortunately has lost 8 pounds now over the last 2 months as well secondary to poor appetite related to the pain.    She was supposed to have a knee injection in the last week and something happened with the machine being broken so she was unable to get it done and then her insurance was not covering it.  She continues to do physical therapy for her knees and is getting Kinesio taping done which does help some.  Additionally she does follow-up for chiropractic manipulation once a month.  She is wondering if she is may be lacking hormones and that is why her pain is so severe as she was not on hormone replacement after her hysterectomy at 45.    She does have left ear pain as well.     She does feel quite depressed as well. She does wonder what she can do for herself. She continues to feel frustrated about her medical complications.  She feels like she no longer wants to live secondary to the pain in her frustration.    Her left elbow, hip and knees still hurt from the fall that she took several months ago.  She has not had any imaging done of them since the fall.  Chief Complaint   Patient presents with     Follow-up     Patient is here today to follow up from the endoscopic ultrasound on 12/14/18, GI concerns  "and PT visits.      Joint Pain     Patient is still having joint pain, states her \"bones burn\".          Patient Active Problem List   Diagnosis     Chronic kidney disease (CKD) stage G3a/A1, moderately decreased glomerular filtration rate (GFR) between 45-59 mL/min/1.73 square meter and albuminuria creatinine ratio less than 30 mg/g (H)     Focal glomerular sclerosis     Anxiety and depression     RSD lower limb, bilateral     ADD (attention deficit disorder)     RSD upper limb, right     Other specified hypothyroidism     Osteopenia     Major depression     Hypertension     Insomnia     Mixed hyperlipidemia     Right rotator cuff tear     Cluster headaches     Lumbar radiculopathy     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     Reflex sympathetic dystrophy     Acute encephalopathy     Temporomandibular joint-pain-dysfunction syndrome (TMJ)     Pancreatitis     Localized swelling of lower leg     Medical cannabis use     Acute right-sided low back pain without sciatica     Acquired hypothyroidism     Chronic pain syndrome     Non morbid obesity due to excess calories     Snoring     Inadequate pain control     Diarrhea     Abdominal pain     Dermatochalasis of left eyelid     Bilateral carotid artery stenosis     Coronary artery disease involving native coronary artery of native heart without angina pectoris     Sinus bradycardia       Current Outpatient Medications   Medication Sig Dispense Refill     aspirin 81 MG EC tablet Take 81 mg by mouth daily.       cholecalciferol, vitamin D3, 5,000 unit Tab Take 1 tablet by mouth daily.       diphenhydrAMINE (BENADRYL) 25 mg capsule Take 25 mg by mouth every 6 (six) hours as needed.        diphenoxylate-atropine (LOMOTIL) 2.5-0.025 mg per tablet Take 1 tablet by mouth 4 (four) times a day as needed for diarrhea. 30 tablet 1     fentaNYL (DURAGESIC) 25 mcg/hr Place 1 patch on the skin every third day. 5 patch 0     levothyroxine (LEVO-T) 50 MCG tablet Take 1 " tablet (50 mcg total) by mouth Daily at 6:00 am. 90 tablet 3     loperamide (IMODIUM) 2 mg capsule Take 2 mg by mouth 4 (four) times a day as needed for diarrhea .        1     naloxone (NARCAN) 4 mg/actuation nasal spray 1 spray (4 mg dose) into one nostril for opioid reversal. Call 911. May repeat if no response in 3 minutes. 1 Box 0     OMEGA-3/DHA/EPA/FISH OIL (FISH OIL-OMEGA-3 FATTY ACIDS) 300-1,000 mg capsule Take 1.2 g by mouth 2 (two) times a day.       rosuvastatin (CRESTOR) 40 MG tablet Take 1 tablet (40 mg total) by mouth daily. 90 tablet 3     senna-docusate (PERICOLACE) 8.6-50 mg tablet Take 2 tablets by mouth daily. (Patient taking differently: Take 2 tablets by mouth daily as needed .      ) 60 tablet 2     spironolactone (ALDACTONE) 25 MG tablet Take 0.5 tablets (12.5 mg total) by mouth daily. 45 tablet 1     SUMAtriptan (IMITREX) 50 MG tablet Take 1 tablet (50 mg total) by mouth every 2 (two) hours as needed for migraine. 27 tablet 1     topiramate (TOPAMAX) 50 MG tablet Take 1 tablet (50 mg total) by mouth 2 (two) times a day. (Patient taking differently: Take 25 mg by mouth 2 (two) times a day .      ) 180 tablet 1     traZODone (DESYREL) 100 MG tablet TAKE 2 TO 4 TABLETS(200  MG) BY MOUTH AT BEDTIME 360 tablet 1     UNABLE TO FIND Med Name: DGL PLUS 760 mg deglycyrrhizinated licorice plus 100 mg glycine.             fentaNYL (DURAGESIC) 50 mcg/hr Place 1 patch on the skin every third day. 5 patch 0     Current Facility-Administered Medications   Medication Dose Route Frequency Provider Last Rate Last Dose     denosumab 60 mg (PROLIA 60 mg/ml)  60 mg Subcutaneous Q6 Months Andrei Olivera MD   60 mg at 18 1126       Social History     Tobacco Use   Smoking Status Former Smoker     Packs/day: 1.00     Years: 20.00     Pack years: 20.00     Last attempt to quit: 2000     Years since quittin.0   Smokeless Tobacco Never Used           OBJECTIVE:        Recent Results (from  the past 240 hour(s))   Erythrocyte Sedimentation Rate   Result Value Ref Range    Sed Rate 7 0 - 20 mm/hr   HM1 (CBC with Diff)   Result Value Ref Range    WBC 5.0 4.0 - 11.0 thou/uL    RBC 3.99 3.80 - 5.40 mill/uL    Hemoglobin 12.8 12.0 - 16.0 g/dL    Hematocrit 38.3 35.0 - 47.0 %    MCV 96 80 - 100 fL    MCH 32.2 27.0 - 34.0 pg    MCHC 33.5 32.0 - 36.0 g/dL    RDW 11.9 11.0 - 14.5 %    Platelets 168 140 - 440 thou/uL    MPV 7.9 7.0 - 10.0 fL    Neutrophils % 54 50 - 70 %    Lymphocytes % 34 20 - 40 %    Monocytes % 9 2 - 10 %    Eosinophils % 2 0 - 6 %    Basophils % 0 0 - 2 %    Neutrophils Absolute 2.7 2.0 - 7.7 thou/uL    Lymphocytes Absolute 1.7 0.8 - 4.4 thou/uL    Monocytes Absolute 0.5 0.0 - 0.9 thou/uL    Eosinophils Absolute 0.1 0.0 - 0.4 thou/uL    Basophils Absolute 0.0 0.0 - 0.2 thou/uL       Vitals:    01/04/19 1422   BP: 102/62   Patient Site: Left Arm   Patient Position: Sitting   Cuff Size: Adult Regular   Pulse: 86   SpO2: 98%   Weight: 135 lb 11.2 oz (61.6 kg)     Weight: 135 lb 11.2 oz (61.6 kg)          Physical Exam:  GENERAL APPEARANCE: A&A, thin, anxious  SKIN:  Normal skin turgor, no lesions/rashes   HEENT: moist mucous membranes, no rhinorrhea  NECK: Normal  CV: RRR, no M/G/R   LUNGS: CTAB  EXTREMITY: no edema, bilateral knees are enlarged with tenderness palpation over the medial joint lines bilaterally especially on the right, elbow is normal and unremarkable appearing, hip has slightly decreased internal rotation, tibia is prominent on the right lower extremity proximally  NEURO: no gross deficits   Psych: Her affect is flat, she is tearful, her eye contact is fair, her thought process is rambling
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
## 2021-06-22 NOTE — ANESTHESIA CARE TRANSFER NOTE
Last vitals:   Vitals:    12/14/18 0853   BP: 116/57   Pulse: 94   Resp: 16   Temp: 37.1  C (98.7  F)   SpO2: 99%     Patient's level of consciousness is drowsy  Spontaneous respirations: yes  Maintains airway independently: yes  Dentition unchanged: yes  Oropharynx: oropharynx clear of all foreign objects    QCDR Measures:  ASA# 20 - Surgical Safety Checklist: WHO surgical safety checklist completed prior to induction    PQRS# 430 - Adult PONV Prevention: 4558F - Pt received => 2 anti-emetic agents (different classes) preop & intraop  ASA# 8 - Peds PONV Prevention: NA - Not pediatric patient, not GA or 2 or more risk factors NOT present  PQRS# 424 - Aracelis-op Temp Management: 4559F - At least one body temp DOCUMENTED => 35.5C or 95.9F within required timeframe  PQRS# 426 - PACU Transfer Protocol: - Transfer of care checklist used  ASA# 14 - Acute Post-op Pain: ASA14B - Patient did NOT experience pain >= 7 out of 10

## 2021-06-23 NOTE — PROGRESS NOTES
Pt is being seen today by Nikunj Lynn MD, for refill and f/u of 7-constant stabbing back pain. F4

## 2021-06-23 NOTE — TELEPHONE ENCOUNTER
Prolia injection due after 02.13.2019. No available osteo f/u appointment w/Dr. Olivera until after 07.08.2019. Is Dr. Olivera willing to order prolia injection now and see the patient 6 mos from the injection for Osteo f/u?     Please advise.     Sandy @ [phone], harsh.

## 2021-06-23 NOTE — TELEPHONE ENCOUNTER
"Triage call:   Patient very distraught stating \"I'm hemorrhaging and I'm on Warfarin. I'm bleeding from my vagina\" Patient was unable to indicate how much blood she has passed but states that she is feeling very lightheadded.     Lab Results   Component Value Date    INR 2.90 (H) 02/06/2019    INR 1.56 (H) 02/04/2019    INR 1.29 (H) 02/03/2019       Instructed patient that if she was feeling lightheadded and like she was going to pass out that she needs to call 911. Patient states that she has a friend coming but it was not clear when they would arrive. Advised patient that if she truly felt that she was going to pass out to call 911 immediately while she was still conscious and able to call for herself. Patient stated \"ok, I will, bye\" and ended the call.     Liz Jackson RN BA [facility] Triage/Med Refill 2/7/2019 3:52 PM    Reason for Disposition    SEVERE dizziness (e.g., unable to stand, requires support to walk, feels like passing out now)    Protocols used: VAGINAL BLEEDING - AFODUPFF-A-PT      "

## 2021-06-23 NOTE — PROGRESS NOTES
ASSESSMENT/PLAN:       1. Pulmonary embolus, right (H)  -Continues to have pleuritic chest pain but somewhat improved. Will schedule tylenol for now.   -Tolerating her warfarin at this time, therapeutic today. Recommendation was to continue on Lovenox for two days of therapeutic INR.   -Discussion was to make sure she was up to date on cancer screening, both mammogram and colonoscopy done in the last year, will await consultation with hematology which is scheduled already for further guidance.   -Will call if further shortness of breath.   -Home care is ordered.   - INR  - Ambulatory referral to Anticoagulation Monitoring    2. Uncomplicated opioid dependence (H)  -Seeing the pain clinic.     3. Nausea  -She has not responded to Zofran, Reglan or Vistaril in the past. She has done well with Phenergan, will trial this for her nausea. F/u in two weeks.   - promethazine (PHENERGAN) 12.5 MG tablet; Take 1 tablet (12.5 mg total) by mouth every 6 (six) hours as needed for nausea.  Dispense: 20 tablet; Refill: 1    4. Weight loss  -Given her nausea and pain she is not eating. Will have her trial some ensure, prescription provided today.   - food supplemt, lactose-reduced (ENSURE ORIGINAL) Liqd; Take 118 mL by mouth daily.  Dispense: 30 Can; Refill: 1      Return if symptoms worsen or fail to improve, for Next scheduled follow up.     40 minutes was spent face-to-face with the patient during this encounter and >50% of this was spent on counseling and coordination of care. We discussed in depth the topics listed above.          Caitlyn Rees MD      PROGRESS NOTE   2/6/2019    SUBJECTIVE:  Sandy Spencer is a 78 y.o. female  who presents for follow up.     The patient was admitted to the hospital for a PE which was found due to her flank pain. She was sure that she had pneumonia on presentation. She did not have any additional pain management provided int he hospital. She continues to have significant pain in  her right flank. She does get up to go the bathroom, she will be out of breath and then will sit down. She cannot do anything. Vistaril didn't help. She did not find that tylenol helped.     Last night she got a funny feeling, had a bad headache and felt light headed. It lasted a few hours. She has been doing ok with the Lovenox shots and is taking warfarin without difficulty other than that feeling last night. She has been on warfarin in the past without issues.     She did have a geniculate block done, and was supposed to have the second one done yesterday. She doesn't think that she can live through this again. It was the most painful thing she has ever had. She just shook from the discomfort. It did help with the pain for 12 hours. She does have to have it two more times. She does worry that the next one would hurt just as bad as the first one.     She is losing weight as well. She does not feel hungry and is quite nauseated. The pain gets overwhelming. She swore that she had pneumonia. She has done well with phenergan in the past. Reglan and zofran in the hospital haven't worked, [facility].       Chief Complaint   Patient presents with     Hospital Visit Follow Up     Follow up from hospital stay 1/26/19 - 2/4/19 for a PE. Patient is still having a lot of pain in her mid right back. She is feeling weak.          Patient Active Problem List   Diagnosis     Chronic kidney disease (CKD) stage G3a/A1, moderately decreased glomerular filtration rate (GFR) between 45-59 mL/min/1.73 square meter and albuminuria creatinine ratio less than 30 mg/g (H)     Focal glomerular sclerosis     Anxiety and depression     RSD lower limb, bilateral     ADD (attention deficit disorder)     RSD upper limb, right     Other specified hypothyroidism     Osteopenia     Major depression     Hypertension     Insomnia     Mixed hyperlipidemia     Right rotator cuff tear     Cluster  headaches     Lumbar radiculopathy     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     Reflex sympathetic dystrophy     Acute encephalopathy     Temporomandibular joint-pain-dysfunction syndrome (TMJ)     Pancreatitis     Localized swelling of lower leg     Medical cannabis use     Acute right-sided low back pain without sciatica     Acquired hypothyroidism     Chronic pain syndrome     Non morbid obesity due to excess calories     Snoring     Inadequate pain control     Diarrhea     Abdominal pain     Dermatochalasis of left eyelid     Bilateral carotid artery stenosis     Coronary artery disease involving native coronary artery of native heart without angina pectoris     Sinus bradycardia     Pulmonary embolus, right (H)     Splenic infarction     Uncomplicated opioid dependence (H)     Hemoptysis       Current Outpatient Medications   Medication Sig Dispense Refill     cholecalciferol, vitamin D3, 5,000 unit Tab Take 1 tablet by mouth daily.       ciclopirox (PENLAC) 8 % solution Apply over nail and surrounding skin. Apply daily over previous coat. After seven (7) days, may remove with alcohol and continue cycle. 6.6 mL 1     diclofenac sodium (VOLTAREN) 1 % Gel Apply 1 g topically 2 (two) times a day as needed (to knees).       diphenhydrAMINE (BENADRYL) 25 mg capsule Take 25 mg by mouth every 6 (six) hours as needed.        diphenoxylate-atropine (LOMOTIL) 2.5-0.025 mg per tablet Take 1 tablet by mouth 4 (four) times a day as needed for diarrhea. 30 tablet 1     enoxaparin (LOVENOX) 60 mg/0.6 mL syringe Inject 0.6 mL (60 mg total) under the skin every 12 (twelve) hours. With one refill if needed. 10 Syringe 0     fentaNYL (DURAGESIC) 50 mcg/hr Place 1 patch on the skin every third day. 10 patch 0     GENERLAC 10 gram/15 mL solution TK 30ML PO QD (Patient taking differently: Take 30 mL by mouth daily as needed. TK 30ML PO QD      ) 236 mL 3     L.acid/L.casei/B.bif/B.karie/FOS (PROBIOTIC BLEND ORAL) Take 1  capsule by mouth daily.       lamoTRIgine (LAMICTAL) 5 MG TChD Take 5 mg by mouth 3 (three) times a day.       levothyroxine (LEVO-T) 50 MCG tablet Take 1 tablet (50 mcg total) by mouth Daily at 6:00 am. 90 tablet 3     loperamide (IMODIUM) 2 mg capsule Take 2 mg by mouth 4 (four) times a day as needed for diarrhea .        1     naloxone (NARCAN) 4 mg/actuation nasal spray 1 spray (4 mg dose) into one nostril for opioid reversal. Call 911. May repeat if no response in 3 minutes. 1 Box 0     omeprazole (PRILOSEC) 40 MG capsule Take 40 mg by mouth daily as needed.       rosuvastatin (CRESTOR) 40 MG tablet Take 1 tablet (40 mg total) by mouth daily. 90 tablet 3     senna-docusate (PERICOLACE) 8.6-50 mg tablet Take 2 tablets by mouth daily. (Patient taking differently: Take 2 tablets by mouth daily as needed .      ) 60 tablet 2     spironolactone (ALDACTONE) 25 MG tablet Take 0.5 tablets (12.5 mg total) by mouth daily. 45 tablet 1     SUMAtriptan (IMITREX) 50 MG tablet Take 1 tablet (50 mg total) by mouth every 2 (two) hours as needed for migraine. 27 tablet 1     topiramate (TOPAMAX) 50 MG tablet Take 1 tablet (50 mg total) by mouth 2 (two) times a day. (Patient taking differently: Take 25-50 mg by mouth 2 (two) times a day.       ) 180 tablet 1     traZODone (DESYREL) 100 MG tablet TAKE 2 TO 4 TABLETS(200  MG) BY MOUTH AT BEDTIME (Patient taking differently: Take 300 mg by mouth at bedtime.       ) 360 tablet 1     UNABLE TO FIND Take 1 tablet by mouth 3 (three) times a day. Med Name: DGL PLUS 760 mg deglycyrrhizinated licorice plus 100 mg glycine             warfarin (COUMADIN) 5 MG tablet Take 2 tablets (10mg total ) by mouth daily. INR recheck 2/6. 60 tablet 0     food supplemt, lactose-reduced (ENSURE ORIGINAL) Liqd Take 118 mL by mouth daily. 30 Can 1     promethazine (PHENERGAN) 12.5 MG tablet Take 1 tablet (12.5 mg total) by mouth every 6 (six) hours as needed for nausea. 20 tablet 1     Current  Facility-Administered Medications   Medication Dose Route Frequency Provider Last Rate Last Dose     denosumab 60 mg (PROLIA 60 mg/ml)  60 mg Subcutaneous Q6 Months Andrei Olivera MD   60 mg at 18 1126       Social History     Tobacco Use   Smoking Status Former Smoker     Packs/day: 1.00     Years: 20.00     Pack years: 20.00     Last attempt to quit: 2000     Years since quittin.1   Smokeless Tobacco Never Used           OBJECTIVE:        Recent Results (from the past 240 hour(s))   Hemoglobin   Result Value Ref Range    Hemoglobin 10.2 (L) 12.0 - 16.0 g/dL   FACTOR 5 LEIDEN AND FACTOR 2 PROTHROMBIN MUTATION PANEL($$$)   Result Value Ref Range    Factor 5 Leiden and Factor 2 Prothrombin Mutation PCR Results Below    Hemoglobin   Result Value Ref Range    Hemoglobin 9.8 (L) 12.0 - 16.0 g/dL   Potassium   Result Value Ref Range    Potassium 4.2 3.5 - 5.0 mmol/L   INR   Result Value Ref Range    INR 1.11 (H) 0.90 - 1.10   Anti-Xa Heparin Level   Result Value Ref Range    Anti-Xa Heparin Assay 0.30 0.30 - 0.70 IU/mL   Lipid Profile   Result Value Ref Range    Triglycerides 73 <=149 mg/dL    Cholesterol 127 <=199 mg/dL    LDL Calculated 67 <=129 mg/dL    HDL Cholesterol 45 (L) >=50 mg/dL    Patient Fasting > 8hrs? Yes    Hemoglobin   Result Value Ref Range    Hemoglobin 10.3 (L) 12.0 - 16.0 g/dL   INR   Result Value Ref Range    INR 2.54 (H) 0.90 - 1.10   Potassium - Next AM   Result Value Ref Range    Potassium 4.2 3.5 - 5.0 mmol/L   Anti-Xa Heparin Level   Result Value Ref Range    Anti-Xa Heparin Assay >=1.10 (HH) 0.30 - 0.70 IU/mL   HM2(CBC w/o Differential)   Result Value Ref Range    WBC 4.5 4.0 - 11.0 thou/uL    RBC 3.46 (L) 3.80 - 5.40 mill/uL    Hemoglobin 10.8 (L) 12.0 - 16.0 g/dL    Hematocrit 33.7 (L) 35.0 - 47.0 %    MCV 97 80 - 100 fL    MCH 31.2 27.0 - 34.0 pg    MCHC 32.0 32.0 - 36.0 g/dL    RDW 13.1 11.0 - 14.5 %    Platelets 203 140 - 440 thou/uL    MPV 9.1 8.5 - 12.5 fL   INR    Result Value Ref Range    INR 1.13 (H) 0.90 - 1.10   HM2(CBC w/o Differential)   Result Value Ref Range    WBC 3.7 (L) 4.0 - 11.0 thou/uL    RBC 3.48 (L) 3.80 - 5.40 mill/uL    Hemoglobin 10.8 (L) 12.0 - 16.0 g/dL    Hematocrit 32.7 (L) 35.0 - 47.0 %    MCV 94 80 - 100 fL    MCH 31.0 27.0 - 34.0 pg    MCHC 33.0 32.0 - 36.0 g/dL    RDW 13.0 11.0 - 14.5 %    Platelets 224 140 - 440 thou/uL    MPV 8.8 8.5 - 12.5 fL   Basic Metabolic Panel   Result Value Ref Range    Sodium 139 136 - 145 mmol/L    Potassium 3.7 3.5 - 5.0 mmol/L    Chloride 106 98 - 107 mmol/L    CO2 26 22 - 31 mmol/L    Anion Gap, Calculation 7 5 - 18 mmol/L    Glucose 80 70 - 125 mg/dL    Calcium 8.9 8.5 - 10.5 mg/dL    BUN 16 8 - 28 mg/dL    Creatinine 0.81 0.60 - 1.10 mg/dL    GFR MDRD Af Amer >60 >60 mL/min/1.73m2    GFR MDRD Non Af Amer >60 >60 mL/min/1.73m2   aPTT   Result Value Ref Range    PTT 39 (H) 24 - 37 seconds   Anti-Xa Heparin Level   Result Value Ref Range    Anti-Xa Heparin Assay 0.59 0.30 - 0.70 IU/mL   Anti-Xa Heparin Level   Result Value Ref Range    Anti-Xa Heparin Assay 0.56 0.30 - 0.70 IU/mL   INR   Result Value Ref Range    INR 1.08 0.90 - 1.10   HM2(CBC w/o Differential)   Result Value Ref Range    WBC 5.0 4.0 - 11.0 thou/uL    RBC 3.43 (L) 3.80 - 5.40 mill/uL    Hemoglobin 10.7 (L) 12.0 - 16.0 g/dL    Hematocrit 32.5 (L) 35.0 - 47.0 %    MCV 95 80 - 100 fL    MCH 31.2 27.0 - 34.0 pg    MCHC 32.9 32.0 - 36.0 g/dL    RDW 12.9 11.0 - 14.5 %    Platelets 239 140 - 440 thou/uL    MPV 9.0 8.5 - 12.5 fL   Basic Metabolic Panel   Result Value Ref Range    Sodium 138 136 - 145 mmol/L    Potassium 4.3 3.5 - 5.0 mmol/L    Chloride 107 98 - 107 mmol/L    CO2 23 22 - 31 mmol/L    Anion Gap, Calculation 8 5 - 18 mmol/L    Glucose 95 70 - 125 mg/dL    Calcium 9.0 8.5 - 10.5 mg/dL    BUN 18 8 - 28 mg/dL    Creatinine 0.93 0.60 - 1.10 mg/dL    GFR MDRD Af Amer >60 >60 mL/min/1.73m2    GFR MDRD Non Af Amer 59 (L) >60 mL/min/1.73m2   INR    Result Value Ref Range    INR 1.27 (H) 0.90 - 1.10   Platelet Count - DO NOT remove unless platelet count already ordered by other source   Result Value Ref Range    Platelets 252 140 - 440 thou/uL   Hemoglobin   Result Value Ref Range    Hemoglobin 10.7 (L) 12.0 - 16.0 g/dL   Anti-Xa Heparin Level   Result Value Ref Range    Anti-Xa Heparin Assay 0.80 (H) 0.30 - 0.70 IU/mL   Potassium - Next AM   Result Value Ref Range    Potassium 3.9 3.5 - 5.0 mmol/L   Anti-Xa Heparin Level   Result Value Ref Range    Anti-Xa Heparin Assay 0.75 (H) 0.30 - 0.70 IU/mL   Anti-Xa Heparin Level   Result Value Ref Range    Anti-Xa Heparin Assay >=1.10 (HH) 0.30 - 0.70 IU/mL   INR   Result Value Ref Range    INR 1.29 (H) 0.90 - 1.10   Potassium - Next AM   Result Value Ref Range    Potassium 4.3 3.5 - 5.0 mmol/L   Hemoglobin   Result Value Ref Range    Hemoglobin 11.5 (L) 12.0 - 16.0 g/dL   INR   Result Value Ref Range    INR 1.56 (H) 0.90 - 1.10   Platelet Count - DO NOT remove unless platelet count already ordered by other source   Result Value Ref Range    Platelets 259 140 - 440 thou/uL   Potassium - Next AM   Result Value Ref Range    Potassium 4.0 3.5 - 5.0 mmol/L   Creatinine   Result Value Ref Range    Creatinine 0.84 0.60 - 1.10 mg/dL    GFR MDRD Af Amer >60 >60 mL/min/1.73m2    GFR MDRD Non Af Amer >60 >60 mL/min/1.73m2   INR   Result Value Ref Range    INR 2.90 (H) 0.90 - 1.10       Vitals:    02/06/19 0955   BP: 112/58   Patient Site: Left Arm   Patient Position: Sitting   Cuff Size: Adult Regular   Pulse: 96   SpO2: 97%   Weight: 131 lb 8 oz (59.6 kg)     Weight: 131 lb 8 oz (59.6 kg)          Physical Exam:  GENERAL APPEARANCE: A&A, NAD, frail appearing  SKIN:  Normal skin turgor, no lesions/rashes   HEENT: moist mucous membranes, no rhinorrhea  NECK: Normal  CV: RRR, no M/G/R   LUNGS: CTAB, no wheezing, some TTP over the right flank  EXTREMITY: no edema   NEURO: no gross deficits   Psych: Affect is tearful, she is well  dressed and groomed.

## 2021-06-23 NOTE — PROGRESS NOTES
"Patient here with bilateral knee pain. States had knee joint injected with Synvisc on 1/9/2019 and \"pain got worse\". Scheduled for Genicular blocks to bilateral knees.  "

## 2021-06-23 NOTE — TELEPHONE ENCOUNTER
Initially patient was only a P.T referral now asking to add skilled nursing for INR, weakness     Orders as requested     Ok for skilled nursing admission on 2/8/19, ok for INR via fingerstick on 2/8/19

## 2021-06-23 NOTE — TELEPHONE ENCOUNTER
"Spoke with NOAH Witt with [facility].  Patient reported over a phone call with [facility] that she is having \"significant\" vaginal bleeding. Patient had an INR of 2.90 yesterday (2/6/19), Warfarin dose was decreased, and she was instructed to continue Lovenox injections every 12 hours. Patient had an office visit with PCP yesterday also, No bleeding reported at that time.  Recommended by ACN to be seen at the ED.  NOAH Witt also recommended to patient to go into the ED.  Patient scheduled for her first appt with [facility] nursing services tomorrow.   It would still be in the patient's best interest to go into the ED.  Forwarding message to PCP as status update.    Caitlyn Posey RN  Anticoagulation Nurse  [phone]    "

## 2021-06-23 NOTE — TELEPHONE ENCOUNTER
I agree if she is having significant bleeding she needs to call 911 or go to the ER. Please confirm she has done this.

## 2021-06-23 NOTE — PROGRESS NOTES
TCM DISCHARGE FOLLOW UP CALL    Discharge Date:  2/4/2019  Reason for hospital stay (discharge diagnosis)::  PEs, splenic infarction  Are you feeling better, the same or worse since your discharge?:  Patient is feeling the same (Still has chest discomfort and dyspnea. Pt states it is better than when she went into the hospital. Thir RN can hear mild dyspnea during conversation. Pt is speaking in full sentences. She is able to manage ADLS independently.)  Do you feel like you have a plan in the event of a health emergency?: Yes (She talks to her friend who is an RN)    (RN) Patient provided with information to call us in the event of a health emergency: Yes (Informed pt of 24 hr nurse triage availability)    As part of your discharge plan, were  home care services ordered for you?: Yes    Have you seen them yet, or are they scheduled to visit?: Yes    Do you have any follow up visits scheduled with your PCP or Specialist?:  Yes, with PCP (today)  (RN) Is PCP appt scheduled soon enough (within 14 days of discharge date)?: Yes

## 2021-06-23 NOTE — ANESTHESIA POSTPROCEDURE EVALUATION
Patient: Sandy Spencer  CYSTOSCOPY, BLADDER BIOPSY, RIGHT SIDED URETEROSCOPY WITH SELECTED CYTOLOGY, CLOT IRRIGATION, CYSTOURETEROSCOPY, WITH STENT INSERTION RIGHT URETER  Anesthesia type: general    Patient location: PACU  Last vitals:   Vitals:    02/10/19 0840   BP: 125/60   Pulse: 83   Resp: 22   Temp:    SpO2: 100%     Post vital signs: stable  Level of consciousness: awake, alert and responds to simple questions  Post-anesthesia pain: pain controlled  Post-anesthesia nausea and vomiting: no  Pulmonary: unassisted, return to baseline  Cardiovascular: stable and blood pressure at baseline  Hydration: adequate  Anesthetic events: no    QCDR Measures:  ASA# 11 - Araceils-op Cardiac Arrest: ASA11B - Patient did NOT experience unanticipated cardiac arrest  ASA# 12 - Aracelis-op Mortality Rate: ASA12B - Patient did NOT die  ASA# 13 - PACU Re-Intubation Rate: ASA13B - Patient did NOT require a new airway mgmt  ASA# 10 - Composite Anes Safety: ASA10A - No serious adverse event    Additional Notes:

## 2021-06-23 NOTE — PATIENT INSTRUCTIONS - HE
PLAN:    Dr Lynn to refill Fentanyl 50 mcg patch    Dr. Lynn to discuss with Dr. Mittal scheduling knee geniculate nerve block with the synvisc injecitons    Refill lactulose    Lamictal to help anxiety 5 mg twice a day, after one week may increase to one tab three times a day    Continue with Brinda    Return in 5-6 weeks

## 2021-06-23 NOTE — PROGRESS NOTES
"Review of several phone calls.  She called requesting to decrease her fentanyl quickly.  We did decrease from 50-25 mcg.  The then called last week wishing to resume the dose noting the pain was a problem.    She reviews today 1 of the pharmacy techs when discussing insurance would not pay for the Relistor made a comment \"what you expect, you're an  addict\".  This distressed her and she wished to taper the medication quickly.  We discussed stigma, differences between dependence and addiction, and side effects.    She reviews having lost 8-1/2 pounds during that time of opioid withdrawal going back on the fentanyl.  She would like to decrease his sometime in the future.    Presently she is doing physical therapy to help with her knees.    She has had Synvisc injections with Dr. Mittal.    She is working with GI doctors to assess a total of 30 pound weight loss since the summer.  Part of this she reviews relates to have stressors with neighbors and discusses this.  She did have the \"camera\" study is not yet heard the results yet.  She is aware they are addressing ulcers in her ileum.  She did a breath test was need to be reordered.  She is just started some probiotics and is taking the D GL supplements.    She wished to have something to help with anxiety.  Reviewed the Seroquel has seem to precipitate some josé miguel.  We are not using benzodiazepines.    She is working with Brinda Burns in therapy.      Current Outpatient Medications:      aspirin 81 MG EC tablet, Take 81 mg by mouth daily., Disp: , Rfl:      cholecalciferol, vitamin D3, 5,000 unit Tab, Take 1 tablet by mouth daily., Disp: , Rfl:      diclofenac sodium (VOLTAREN) 1 % Gel, HAIDER 1 GRAM AA BID PRN P, Disp: , Rfl: 1     diphenhydrAMINE (BENADRYL) 25 mg capsule, Take 25 mg by mouth every 6 (six) hours as needed. , Disp: , Rfl:      diphenoxylate-atropine (LOMOTIL) 2.5-0.025 mg per tablet, Take 1 tablet by mouth 4 (four) times a day as needed for diarrhea., " Disp: 30 tablet, Rfl: 1     [START ON 1/16/2019] fentaNYL (DURAGESIC) 50 mcg/hr, Place 1 patch on the skin every third day., Disp: 10 patch, Rfl: 0     GENERLAC 10 gram/15 mL solution, TK 30ML PO QD, Disp: 236 mL, Rfl: 3     lamoTRIgine (LAMICTAL) 5 MG TChD, One tab twice a day for one week, may increase to one three times a day, Disp: 60 tablet, Rfl: 1     levothyroxine (LEVO-T) 50 MCG tablet, Take 1 tablet (50 mcg total) by mouth Daily at 6:00 am., Disp: 90 tablet, Rfl: 3     loperamide (IMODIUM) 2 mg capsule, Take 2 mg by mouth 4 (four) times a day as needed for diarrhea .   , Disp: , Rfl: 1     naloxone (NARCAN) 4 mg/actuation nasal spray, 1 spray (4 mg dose) into one nostril for opioid reversal. Call 911. May repeat if no response in 3 minutes., Disp: 1 Box, Rfl: 0     OMEGA-3/DHA/EPA/FISH OIL (FISH OIL-OMEGA-3 FATTY ACIDS) 300-1,000 mg capsule, Take 1.2 g by mouth 2 (two) times a day., Disp: , Rfl:      rosuvastatin (CRESTOR) 40 MG tablet, Take 1 tablet (40 mg total) by mouth daily., Disp: 90 tablet, Rfl: 3     senna-docusate (PERICOLACE) 8.6-50 mg tablet, Take 2 tablets by mouth daily. (Patient taking differently: Take 2 tablets by mouth daily as needed .   ), Disp: 60 tablet, Rfl: 2     spironolactone (ALDACTONE) 25 MG tablet, Take 0.5 tablets (12.5 mg total) by mouth daily., Disp: 45 tablet, Rfl: 1     SUMAtriptan (IMITREX) 50 MG tablet, Take 1 tablet (50 mg total) by mouth every 2 (two) hours as needed for migraine., Disp: 27 tablet, Rfl: 1     topiramate (TOPAMAX) 50 MG tablet, Take 1 tablet (50 mg total) by mouth 2 (two) times a day. (Patient taking differently: Take 25 mg by mouth 2 (two) times a day .   ), Disp: 180 tablet, Rfl: 1     traZODone (DESYREL) 100 MG tablet, TAKE 2 TO 4 TABLETS(200  MG) BY MOUTH AT BEDTIME, Disp: 360 tablet, Rfl: 1     UNABLE TO FIND, Med Name: DGL PLUS 760 mg deglycyrrhizinated licorice plus 100 mg glycine.   , Disp: , Rfl:     Current Facility-Administered  "Medications:      denosumab 60 mg (PROLIA 60 mg/ml), 60 mg, Subcutaneous, Q6 Months, Andrei Olivera MD, 60 mg at 08/13/18 1126  Blood pressure 102/66, pulse 78, height 5' 2\" (1.575 m), weight 134 lb (60.8 kg), not currently breastfeeding.    Pain score 7.  She is alert with a clear sensorium good eye contact appropriately groomed.  Speech is normal rate and rhythm.    Impression: Chronic pain includes lower extremity chronic regional pain syndrome, lumbar degenerative changes, migraine headaches.    More recently has been dealing with GI issues.    There is a comorbid anxiety disorder with significant psychosis    Plan: We will continue with the fentanyl 50 mcg.    She notes we had lost the prescription of lactulose which is started by the GI doctor, this will be refilled.      We did a trial of lamotrigine which she felt made her dizzy.  This would have been at 25 mg.  Will start with a lower dose 5 mg twice a day increasing slowly as tolerated see if this will help mood symptoms and autonomic arousal.    Make a referral to Dr. Mittal to consider a geniculate nerve blocks for her knees as well.    Continue working with Brinda Burns and stress management.    Time spent 25 minutes reviewing above history coordinating treatment plan  "

## 2021-06-23 NOTE — TELEPHONE ENCOUNTER
Pt calls to request Genicular Block tomorrow, instead of Synvisc. States she checked with her insurance and found out she doesn't need a PA. Spoke with her PMD, who feels she should have the block. Nurse advised pt to discuss it with Dr Mittal tomorrow. Pt agrees to do so.

## 2021-06-23 NOTE — TELEPHONE ENCOUNTER
FYI - Status Update  Who is Calling: Patient  Update: States is at [facility] with PE of right lung and internal bleeding with HGB under 10.  Does states she was to see MTM on Friday, but this writer does not see this appointment. Patient states she does not think she will make it to the appointment.  Okay to leave a detailed message?:  Yes

## 2021-06-23 NOTE — TELEPHONE ENCOUNTER
Who is calling:  Miryam from [facility]  Reason for Call:  Miryam is calling to get further instructions on how to direct patient with some bleeding issues.  Date of last appointment with primary care: 2/6/19  Has the patient been recently seen:  Yes  Okay to leave a detailed message: Yes

## 2021-06-23 NOTE — PROGRESS NOTES
ASSESSMENT/PLAN:       1. Toenail fungus  -Will have her trial penlac as listed below for her toenail. Will f/u if not improving and we can consider oral treatment as well.   - ciclopirox (PENLAC) 8 % solution; Apply over nail and surrounding skin. Apply daily over previous coat. After seven (7) days, may remove with alcohol and continue cycle.  Dispense: 6.6 mL; Refill: 1    2. Mixed hyperlipidemia  -Updating labs in the near future, will adjust as needed  - Comprehensive Metabolic Panel; Future  - Lipid Cascade; Future    3. Anxiety and depression  -Generally more stable, has appointment with mental health provider in a few weeks. F/u here in one month.     30 minutes was spent face-to-face with the patient during this encounter and >50% of this was spent on counseling and coordination of care. We discussed in depth the topics listed above.        No Follow-up on file.      Caitlyn Rees MD      PROGRESS NOTE   1/18/2019    SUBJECTIVE:  Sandy Spencer is a 78 y.o. female  who presents for follow up in regards to depression.     She is generally doing ok. She did talk with the neighbor upstairs with the  to figure out what is going on. She does think that the upstairs neighbor may move which would greatly depression. She does have an upcoming appointment with [facility] as well, at the end of January.     She did get a Synvisc injection in her knee and the knee pain has gotten worse since then. The left knee has morepain now than before. The right knee feels more pain as well. The left knee has burning pain as well. It was recommended that she get a geniculate nerve block as well, they are working on PA for this.    She has regressed in her PT as well. She cannot get in until the end of January.    She started a probiotic to help with her colon. Sh eis down onlya pound now.     She does have an issues with her right great toe. The nail is digging on the medial side.She  has had this partially removed several times and does worry that it is ingrown again.     She continues to have back pain along her waist as well. She continues to feel worn down.   Chief Complaint   Patient presents with     Depression     Patient is here today to follow up for her two week check in regards to depression.      Knee Pain     Patient is still having left knee pain after recieving knee injection.          Patient Active Problem List   Diagnosis     Chronic kidney disease (CKD) stage G3a/A1, moderately decreased glomerular filtration rate (GFR) between 45-59 mL/min/1.73 square meter and albuminuria creatinine ratio less than 30 mg/g (H)     Focal glomerular sclerosis     Anxiety and depression     RSD lower limb, bilateral     ADD (attention deficit disorder)     RSD upper limb, right     Other specified hypothyroidism     Osteopenia     Major depression     Hypertension     Insomnia     Mixed hyperlipidemia     Right rotator cuff tear     Cluster headaches     Lumbar radiculopathy     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     Reflex sympathetic dystrophy     Acute encephalopathy     Temporomandibular joint-pain-dysfunction syndrome (TMJ)     Pancreatitis     Localized swelling of lower leg     Medical cannabis use     Acute right-sided low back pain without sciatica     Acquired hypothyroidism     Chronic pain syndrome     Non morbid obesity due to excess calories     Snoring     Inadequate pain control     Diarrhea     Abdominal pain     Dermatochalasis of left eyelid     Bilateral carotid artery stenosis     Coronary artery disease involving native coronary artery of native heart without angina pectoris     Sinus bradycardia       Current Outpatient Medications   Medication Sig Dispense Refill     aspirin 81 MG EC tablet Take 81 mg by mouth daily.       cholecalciferol, vitamin D3, 5,000 unit Tab Take 1 tablet by mouth daily.       diclofenac sodium (VOLTAREN) 1 % Gel HAIDER 1 GRAM AA BID PRN  P  1     diphenhydrAMINE (BENADRYL) 25 mg capsule Take 25 mg by mouth every 6 (six) hours as needed.        diphenoxylate-atropine (LOMOTIL) 2.5-0.025 mg per tablet Take 1 tablet by mouth 4 (four) times a day as needed for diarrhea. 30 tablet 1     fentaNYL (DURAGESIC) 50 mcg/hr Place 1 patch on the skin every third day. 10 patch 0     GENERLAC 10 gram/15 mL solution TK 30ML PO  mL 3     lamoTRIgine (LAMICTAL) 5 MG TChD One tab twice a day for one week, may increase to one three times a day 60 tablet 1     levothyroxine (LEVO-T) 50 MCG tablet Take 1 tablet (50 mcg total) by mouth Daily at 6:00 am. 90 tablet 3     loperamide (IMODIUM) 2 mg capsule Take 2 mg by mouth 4 (four) times a day as needed for diarrhea .        1     naloxone (NARCAN) 4 mg/actuation nasal spray 1 spray (4 mg dose) into one nostril for opioid reversal. Call 911. May repeat if no response in 3 minutes. 1 Box 0     OMEGA-3/DHA/EPA/FISH OIL (FISH OIL-OMEGA-3 FATTY ACIDS) 300-1,000 mg capsule Take 1.2 g by mouth 2 (two) times a day.       rosuvastatin (CRESTOR) 40 MG tablet Take 1 tablet (40 mg total) by mouth daily. 90 tablet 3     senna-docusate (PERICOLACE) 8.6-50 mg tablet Take 2 tablets by mouth daily. (Patient taking differently: Take 2 tablets by mouth daily as needed .      ) 60 tablet 2     spironolactone (ALDACTONE) 25 MG tablet Take 0.5 tablets (12.5 mg total) by mouth daily. 45 tablet 1     SUMAtriptan (IMITREX) 50 MG tablet Take 1 tablet (50 mg total) by mouth every 2 (two) hours as needed for migraine. 27 tablet 1     topiramate (TOPAMAX) 50 MG tablet Take 1 tablet (50 mg total) by mouth 2 (two) times a day. (Patient taking differently: Take 25 mg by mouth 2 (two) times a day .      ) 180 tablet 1     traZODone (DESYREL) 100 MG tablet TAKE 2 TO 4 TABLETS(200  MG) BY MOUTH AT BEDTIME 360 tablet 1     UNABLE TO FIND Med Name: DGL PLUS 760 mg deglycyrrhizinated licorice plus 100 mg glycine.             ciclopirox (PENLAC) 8 %  solution Apply over nail and surrounding skin. Apply daily over previous coat. After seven (7) days, may remove with alcohol and continue cycle. 6.6 mL 1     Current Facility-Administered Medications   Medication Dose Route Frequency Provider Last Rate Last Dose     denosumab 60 mg (PROLIA 60 mg/ml)  60 mg Subcutaneous Q6 Months Andrei Olivera MD   60 mg at 18 1126       Social History     Tobacco Use   Smoking Status Former Smoker     Packs/day: 1.00     Years: 20.00     Pack years: 20.00     Last attempt to quit: 2000     Years since quittin.0   Smokeless Tobacco Never Used           OBJECTIVE:        No results found for this or any previous visit (from the past 240 hour(s)).    Vitals:    19 1439   BP: 122/64   Patient Site: Left Arm   Patient Position: Sitting   Cuff Size: Adult Regular   Pulse: 76   Weight: 133 lb 8 oz (60.6 kg)     Weight: 133 lb (60.3 kg)          Physical Exam:  GENERAL APPEARANCE: A&A, NAD, well hydrated, well nourished  SKIN:  Normal skin turgor, no lesions/rashes   HEENT: moist mucous membranes, no rhinorrhea  NECK: Normal  CV: RRR, no M/G/R   LUNGS: CTAB  EXTREMITY: no edema, right great toe nail thickened, yellow, somewhat ingrown   NEURO: no gross deficits   PSYCH: affect is more stable, she is casually dressed and groomed, not tearful today

## 2021-06-23 NOTE — TELEPHONE ENCOUNTER
New Appointment Needed  What is the reason for the visit:    Inpatient/ED Follow Up Appt Request  At what hospital or facility were you seen?: [facility]  What is the reason you were seen?: Pulmonary Embolism  What date were you admitted?: date: 1/26/19  What date were you discharged?: date: 2/4/19  What was the recommended timeframe for your follow up appointment?: 2 days  Provider Preference: PCP only  How soon do you need to be seen?: within 2 days  Waitlist offered?: No  Okay to leave a detailed message:  Yes    Please advise on if we can double book patient on 2/6

## 2021-06-23 NOTE — TELEPHONE ENCOUNTER
ANTICOAGULATION  MANAGEMENT    Assessment     Today's INR result of 2.90 is Therapeutic (goal INR of 2.0-3.0)      Patient is new to Warfarin. Unprovoked (right) PE. Hospitalized 1/26/19 - 2/4/19. Discharged on Lovenox injections and Warfarin medication. Patient has orders to start with HE. ACN spoke with [facility]. THey are planning to see the patient this Friday and they will check the INR during their visit.      Warfarin taken as previously instructed     Lovenox injections taken as instructed every 12 hours. Discharge orders instructed to take Lovenox for 2 days after INR therapeutic.     No new diet changes affecting INR    No interaction expected between Promethazine and warfarin    Continues to tolerate warfarin with no reported s/s of bleeding or thromboembolism     Previous INR was Subtherapeutic    Plan:     Spoke with Sandy regarding INR result and instructed:     Warfarin Dosing Instructions:  Change warfarin dose to 5 mg daily today and on 2/7/19. Continue Lovenox injections until Friday 2/8/19 PM. Patient will be able to discontinue Lovenox if the INR result on Friday 2/8/19 is therapeutic. (Dosing instructions given by PCP at office visit today.)    Instructed patient to follow up no later than: 2/8/19    Education provided: importance of therapeutic range, target INR goal and significance of current INR result, importance of taking warfarin as instructed and my Guide to Warfarin Therapy provided (mailed to patient's home).    Patient has Warfarin 5 mg tablets on hand.    Sandy verbalizes understanding and agrees to warfarin dosing plan.    Instructed to call the [facility] for any changes, questions or concerns. (#[phone])   ?   Caitlyn Posey RN    Subjective/Objective:      Sandy Spencer, a 78 y.o. female is on warfarin.     Sandy reports:     Home warfarin dose: verbally confirmed home dose with Sandy and updated on anticoagulation calendar     Missed doses: No     Medication changes:  Yes:  Promethazine     S/S of bleeding or thromboembolism:  No     New Injury or illness:  No     Changes in diet or alcohol consumption:  No     Upcoming surgery, procedure or cardioversion:  No    Anticoagulation Episode Summary     Current INR goal:   2.0-3.0   TTR:   --   Next INR check:   2/8/2019   INR from last check:   2.90 (2/6/2019)   Weekly max warfarin dose:      Target end date:      INR check location:      Preferred lab:      Send INR reminders to:   ANTICOAGULATION POOL C (DTN,VAD,CGR,GAV)    Indications    Pulmonary embolus  right (H) [I26.99]           Comments:            Anticoagulation Care Providers     Provider Role Specialty Phone number    Caitlyn Rees MD Referring Family Medicine [phone]

## 2021-06-23 NOTE — ANESTHESIA CARE TRANSFER NOTE
Last vitals:   Vitals:    02/10/19 0819   BP: 129/54   Pulse: 93   Resp: 17   Temp:    SpO2: 100%     Patient's level of consciousness is awake  Spontaneous respirations: yes  Maintains airway independently: yes  Dentition unchanged: yes  Oropharynx: oropharynx clear of all foreign objects    QCDR Measures:  ASA# 20 - Surgical Safety Checklist: WHO surgical safety checklist completed prior to induction    PQRS# 430 - Adult PONV Prevention: 4558F - Pt received => 2 anti-emetic agents (different classes) preop & intraop  ASA# 8 - Peds PONV Prevention: 4558F - Pt received => 2 anti-emetic agents (different classes) preop & intraop  PQRS# 424 - Aracelis-op Temp Management: 4559F - At least one body temp DOCUMENTED => 35.5C or 95.9F within required timeframe  PQRS# 426 - PACU Transfer Protocol: - Transfer of care checklist used  ASA# 14 - Acute Post-op Pain: ASA14B - Patient did NOT experience pain >= 7 out of 10   Pt breathing spontaneously, follows commands, suctioned extubated. Spontaneous respirations with SFM to PACU, VSS

## 2021-06-23 NOTE — TELEPHONE ENCOUNTER
Pt calls to ask if she needs a PA for Genicular NB. States Dr. Lynn recommended it. Left  msg for pt stating she should complete Synvisc injections and that she can discuss with Dr Mittal when she comes for Synvisc injections next week.

## 2021-06-23 NOTE — ANESTHESIA PREPROCEDURE EVALUATION
Anesthesia Evaluation      Patient summary reviewed   History of anesthetic complications     Airway   Mallampati: II  Neck ROM: full  Comment: Easy AW by old record  Arthropathy cervical facet jts   Pulmonary - normal exam    breath sounds clear to auscultation  (+) a smoker  (-) sleep apneaShortness of breath: hx of PE 1/26/19.    ROS comment: H/O PE  Slow to wake up from anesthesia with Fentanyl/versed (reports as allergy) but is current user of Fentanyl patch  PONV                         Cardiovascular   (+) hypertension, CAD, angina (yesterday, trponins X 3 negative, EKG unchanged), , hypercholesterolemia, PVD    ECG reviewed  Rhythm: regular  Rate: normal,      ROS comment: troponins pending CP this evening  1. Normal left ventricular size and systolic performance with a visually estimated ejection fraction of 65-70%.   2. No significant valvular heart disease is identified on this study.   3. Normal right ventricular size and systolic performance.   4. The pulmonary artery pressure estimate is within the normal range.     When compared to the prior real-time echocardiogram dated 10 December 2016, there has been little appreciable interval change.     Interpretation Summary       The total Agatston calcium score is 160. A calcium score in this range places the individual in the 75th percentile when compared to an age and gender matched control group and implies a moderate risk of cardiac events in the next ten years.    There is moderate stenosis (50-69%) in the mid left anterior descending artery.            Neuro/Psych    (+) neuromuscular disease,  CVA , depression, anxiety/panic attacks, chronic pain    Comments: Cluster H/As  ADD  Medical Cannabis use  H/O skull fx  RSD with chronic pain RUE  Lumbar radiculopathy      Endo/Other    (+) hypothyroidism, arthritis,      Comments: osteopenia    GI/Hepatic/Renal    (+) GERD,   chronic renal disease CRI, impaired hepatic function    Comments: Chronic  pancreatitis     Other findings:  Scan on: 5/30/17 8:41 AM by:   Indications     Atypical chest pain  Conclusion       The pharmacologic nuclear stress test is negative for inducible myocardial ischemia or infarction.    The left ventricular ejection fraction is greater than 70%.    There is no prior study available.          Dental - normal exam                        Anesthesia Plan  Planned anesthetic: general endotracheal    Numerous allergies  Troponins negative  ASA 3   Induction: intravenous   Anesthetic plan and risks discussed with: patient  Anesthesia plan special considerations: rapid sequence induction, antiemetics,   Post-op plan: routine recovery

## 2021-06-23 NOTE — TELEPHONE ENCOUNTER
Request for Orders  Confirmed start of home care services for 21 with patient per patient request. [facility] will need a new order to start home care services on  as current orders from [facility] will have  by then.     Who s Requesting:Canace    Orders being requested: Pt to start on     Where to send Orders: Simply respond to this Epic Telephone Call message string, and we can take as a verbal order if appropriate.   Thank you.

## 2021-06-23 NOTE — PROGRESS NOTES
"Mental Health Visit Note    1/11/2019    Start time: 1100    Stop Time: 1150   Session # 1 (2019)    Session Type: Patient is presenting for an Individual session.    Sandy Spencer is a 78 y.o. female is being seen today for    Chief Complaint   Patient presents with     Depression   .     New symptoms or complaints: None    Functional Impairment:   Personal: 4  Family:3  Work: NA  Social:3    Clinical assessment of mental status: Patient presented alert and oriented x3.  She was well-groomed.  Her attire was appropriate.  Patient was cooperative.  Patient motor actively was normal and eye contact appropriate.  Patients mood was anxious and affect congruent.  Patient s speech and language was normal.  Patient attention and thought process normal.  Concentration was appropriate.  Patient denied delusions or hallucinations. Patient denied suicidal or homicidal ideation.  Patient denied any long or short term memory problems. No evidence of impairment in judgment.   She has insight and fund of knowledge was adequate.        Suicidal/Homicidal Ideation present: None Reported This Session    Patient's impression of their current status: Pt reports \"I need help on how to resolve relationship with my family\".     Therapist impression of patients current state: Discussed patients history (childhood) experience of feeling unloved, unwanted and neglected.  Discussed how her past impacts her relationship with adult children.  Pt frequently uses the words \"always\" & \"never\" when describing behaviors of her children.  Processed pt's struggle to see the positive and/or accept love from others.  Discussed how her  showed her love, but it was difficult for her to accept.  Processed her struggles to set boundaries with loved ones and her behaviors of feeling like she needs to buy love from children.  Homework for patient: write down positive memories/characteristics/experiences with each one of her children & write down " questions/comments you'd like to discuss with her daughter to review in session.     Type of psychotherapeutic technique provided: CBT and DBT    Progress toward short term goals:Progress as expected, emotional regulation, cognitive restructuring    Review of long term goals: Date of last review 10/18/2018    Diagnosis:   1. 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 Generalized anxiety disorder    3. Chronic pain syndrome        Plan and Follow up: Write down positive memories/characteristics/experiences with each one of her children   write down questions/comments you'd like to discuss with her daughter to review in session.  Follow up with Brinda in 2 weeks  Practice self-care & Follow up with all providers as scheduled    Discharge Criteria/Planning: Patient will continue with follow-up until therapy can be discontinued without return of signs and symptoms.    Ricarda Burns 1/11/2019

## 2021-06-24 ENCOUNTER — COMMUNICATION - HEALTHEAST (OUTPATIENT)
Dept: FAMILY MEDICINE | Facility: CLINIC | Age: 81
End: 2021-06-24

## 2021-06-24 DIAGNOSIS — E78.2 MIXED HYPERLIPIDEMIA: ICD-10-CM

## 2021-06-24 NOTE — TELEPHONE ENCOUNTER
[facility] Nurse Triage Telephone Note      Provider: Anusha Painting MD  Facility: [facility] Type: TCU    Caller: Jayla  Call Back Number:  [phone]    Allergies: Ambien [zolpidem]; Campral [acamprosate]; Carbamazepine; Cymbalta [duloxetine]; Lyrica [pregabalin]; Sulfa (sulfonamide antibiotics); Lamotrigine; Amiloride; Amoxicillin; Aspirin; Augmentin [amoxicillin-pot clavulanate]; Blood-group specific substance; Chromium and derivatives; Flexeril [cyclobenzaprine]; Labetalol; Metoprolol; Mirtazapine; Nsaids (non-steroidal anti-inflammatory drug); Other allergy (see comments); Other drug allergy (see comments); Oxycodone; Serotonin; Serzone [nefazodone]; Tolmetin; Tylenol [acetaminophen]; Verapamil; Cortisone; Depakote [divalproex]; and Sulfacetamide sodium    Reason for call: Nurse calling to clarify orders that were written by MD yesterday.       Verbal Order/Direction given by Provider: Change lasix to 20mg every other day PRN for weight gain of 3# in 24 hours or SBP >180.  DC Oxybutynin.  Schedule Voltaren gel Q AM and Q 2pm and may self administer PRN.  DC Vistaril.  Start Atarax 10mg three times a day.      Provider giving order: Anusha Painting MD    Verbal order given to: Chantell Omalley RN

## 2021-06-24 NOTE — ANESTHESIA PREPROCEDURE EVALUATION
Anesthesia Evaluation      Patient summary reviewed   History of anesthetic complications (PONV)     Airway   Mallampati: II  Neck ROM: full   Pulmonary     breath sounds clear to auscultation  (+) a smoker (former)  (-) rhonchi, wheezes, rales, stridor    ROS comment: PE                         Cardiovascular   Exercise tolerance: < 4 METS  (+) hypertension, CAD, , hypercholesterolemia, PVD (s/p Left CEA)    (-) murmur  ECG reviewed (2/9/19: sinus robert, 59 bpm)  Rhythm: regular  Rate: normal,    no murmur   ROS comment: Echo 1/26/19:  1. Normal left ventricular size and systolic performance with a visually estimated ejection fraction of 65-70%.   2. No significant valvular heart disease is identified on this study.   3. Normal right ventricular size and systolic performance.   4. The pulmonary artery pressure estimate is within the normal range.     When compared to the prior real-time echocardiogram dated 10 December 2016, there has been little appreciable interval change.      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    Relevant Meds:  Fentanyl patch 50 mcg/hr  Topiramate  Trazodone  Warfarin    Labs 2/16/19:  Hgb 12.6, Plt 156  Na 134, K 4.0      Dental - normal exam     Comment: No loose, chipped, partial, removable or dentures.                         Anesthesia Plan  Planned anesthetic: MAC  Plan for propofol gtt w/ ketamine / fentanyl PRN for pain.  Discussed potential need to convert to GA w/ ETT vs LMA if not  tolerating.  Explained that it is possible to remember certain aspects of the OR experience during MAC dependent on the depth of sedation and patient understands this.  Dexamethasone and zofran for PONV ppx.  ASA 3     Anesthetic plan and risks discussed with: patient  Anesthesia plan special considerations: antiemetics,   Post-op plan: routine recovery

## 2021-06-24 NOTE — PROGRESS NOTES
"  [facility]      NAME:  Sandy Spencer             :  1940    MRN: [id number]    CODE STATUS:  FULL CODE    FACILITY: [facility] [[id number]]         CHIEF COMPLAIN/REASON FOR VISIT:  Chief Complaint   Patient presents with     Review Of Multiple Medical Conditions       HISTORY OF PRESENT ILLNESS: Sandy Spencer is a 78 y.o. female being seen today as a new admit to the TCU. She has a very lengthy and complex medical history. She recently presented to [facility] with hematuria, she had started on anticoagulation d/t a PE. She was then transferred to [facility]. Per her hospital records \" history coronary artery disease, chronic chronic pain syndrome, reflex sympathetic dystonia, subtotal colectomy secondary to bowel obstruction, hypothyroidism, recent recurrent pulmonary embolism on anticoagulation who presented to [facility] 19 with gross hematuria.  Patient developed gross hematuria after starting on anticoagulation for pulmonary embolism diagnosed 19. CT with delayed renal imaging revealed \"Stable 2 mm nonobstructing right renal interpolar and 1 mm left renal upper pole nonobstructing calculi. No ureteral calculi. Stable 3 mm amorphous left renal mid to upper pole hyperdensity (series 2, image 34).\"  She underwent underwent cystoscopy on 2/10/19 with cytology concerning for high-grade urothelial cancer.   Right ureter fluid cytology was suspicious for high-grade urothelial carcinoma.  The left ureteral orifice biopsy simply revealed blood clot without any evidence of malignancy (of note, the operative report does not mention any interventions on the left ureter -perhaps this was mislabeled?).  She underwent  S/P Right robotic nephrectomy on .  Surgical pathology exam reported severe hemarthrosis and urothelial atypia, multifocal including scattered foci of urothelial dysplasia.  No diagnostic evidence of in situ or invasive carcinoma. " " Ureteral and vascular margins were negative for tumor.  There is acute and chronic pyelonephritis, patchy chronic and acute urethritis, benign tubular cyst.\" Her abdomen is soft and she has several steri strips in place. She is on MS 7.5 mg, reduced in hospiatl from 15 mg q 6 prn and she has anxiety this will not controll her pain. She has tylenol listed as an allegy, and when I brought this up she reports she doesn't like tylenol as it doesn't do any good. I explained that the MS caused lethargy and she herself reports she was in a coma in the hospital, it is difficult to get a good history as pt is digressing in her conversation.    Allergies   Allergen Reactions     Ambien [Zolpidem] Other (See Comments)     Sleep walking     Campral [Acamprosate]      Nausea, vomiting and headache     Carbamazepine Other (See Comments)     Patient felt \"drunk\".      Cymbalta [Duloxetine] Anaphylaxis     Throat closes     Lyrica [Pregabalin] Anaphylaxis     Throat closes     Sulfa (Sulfonamide Antibiotics) Anaphylaxis            Lamotrigine Other (See Comments) and Dizziness     Fatigue. Now re-trying at a low dose to see if tolerates     Amiloride Unknown     unknown     Amoxicillin Unknown     Aspirin Other (See Comments)     History of gastric ulcers and instructed to not take more than 81 mg/d, 10/5/17 takes 81 mg at home     Augmentin [Amoxicillin-Pot Clavulanate] Diarrhea and Nausea And Vomiting     Blood-Group Specific Substance      Anti-E, Jka present. Expect delays in blood for transfusion.  Draw 2 lavender  for type and screen orders.     Chromium And Derivatives Other (See Comments)     Staples: Reaction to all metals.States serious reactions after surgery      Flexeril [Cyclobenzaprine] Other (See Comments)     Mouth ulcers     Labetalol Unknown     Metoprolol Unknown     Mirtazapine Other (See Comments)     Troubles catching breath.     Nsaids (Non-Steroidal Anti-Inflammatory Drug) Other (See Comments)     H/o " ulcers     Other Allergy (See Comments) Unknown     SURGICAL STAPLES  STEROIDS (was told not to take because of concern of RE CHF)  SSRI's  Metal Staples     Other Drug Allergy (See Comments)       and Staples: turned black into the groin, was told to never have staples again     Oxycodone Headache     Serotonin Unknown     Rebound headaches     Serzone [Nefazodone] Headache     Tolerates trazodone      Tolmetin Other (See Comments)     History of ulcer     Tylenol [Acetaminophen] Headache     Rebound headaches     Verapamil Other (See Comments)     Bradycardia     Cortisone Other (See Comments)     Overuse with a lot of injections, recommended to try something else if needed due to osteopenia     Depakote [Divalproex] Rash     Sulfacetamide Sodium Rash   :     Current Outpatient Medications   Medication Sig     acetaminophen (TYLENOL) 500 MG tablet Take 500 mg by mouth 3 (three) times a day.     hydrOXYzine pamoate (VISTARIL) 25 MG capsule Take 25 mg by mouth 3 (three) times a day.     calcium, as carbonate, (TUMS) 200 mg calcium (500 mg) chewable tablet Chew 1 tablet (200 mg total) 3 (three) times a day as needed.     cholecalciferol, vitamin D3, 5,000 unit Tab Take 1 tablet by mouth daily with supper.            ciclopirox (PENLAC) 8 % solution Apply over nail and surrounding skin. Apply daily over previous coat. After seven (7) days, may remove with alcohol and continue cycle. (Patient taking differently: Apply topically daily as needed. Apply over nail and surrounding skin. Apply daily over previous coat. After seven (7) days, may remove with alcohol and continue cycle      )     diclofenac sodium (VOLTAREN) 1 % Gel Apply 1 g topically 2 (two) times a day as needed (to knees).     diphenoxylate-atropine (LOMOTIL) 2.5-0.025 mg per tablet Take 1 tablet by mouth 4 (four) times a day as needed for diarrhea.     fentaNYL (DURAGESIC) 50 mcg/hr Place 1 patch on the skin every third day.     food supplemt,  lactose-reduced (ENSURE ORIGINAL) Liqd Take 118 mL by mouth daily.     furosemide (LASIX) 20 MG tablet Take 1 tablet (20 mg total) by mouth every other day.     GENERLAC 10 gram/15 mL solution TK 30ML PO QD (Patient taking differently: Take 30 mL by mouth daily as needed. TK 30ML PO QD      )     Lactobacillus acidoph-L.bulgar (FLORANEX) 1 million cell Tab tablet Take 1 tablet by mouth daily.     lamoTRIgine (LAMICTAL) 5 MG TChD Take 1 tablet (5 mg total) by mouth 4 (four) times a day as needed (anxiety).     levothyroxine (LEVO-T) 50 MCG tablet Take 1 tablet (50 mcg total) by mouth Daily at 6:00 am.     loperamide (IMODIUM) 2 mg capsule Take 2 mg by mouth 4 (four) times a day as needed for diarrhea .           morphine (MSIR) 15 MG tablet Take 0.5 tablets (7.5 mg total) by mouth every 6 (six) hours as needed.     naloxone (NARCAN) 4 mg/actuation nasal spray 1 spray (4 mg dose) into one nostril for opioid reversal. Call 911. May repeat if no response in 3 minutes.     omeprazole (PRILOSEC) 40 MG capsule Take 1 capsule (40 mg total) by mouth daily before breakfast.     oxybutynin (DITROPAN) 5 MG tablet Take 1 tablet (5 mg total) by mouth 3 (three) times a day as needed (for bladder spasms).     promethazine (PHENERGAN) 12.5 MG tablet Take 1 tablet (12.5 mg total) by mouth every 6 (six) hours as needed for nausea.     rosuvastatin (CRESTOR) 40 MG tablet Take 1 tablet (40 mg total) by mouth daily.     senna-docusate (PERICOLACE) 8.6-50 mg tablet Take 2 tablets by mouth daily as needed.     SUMAtriptan (IMITREX) 50 MG tablet Take 1 tablet (50 mg total) by mouth every 2 (two) hours as needed for migraine.     topiramate (TOPAMAX) 50 MG tablet Take 0.5-1 tablets (25-50 mg total) by mouth 2 (two) times a day.     traZODone (DESYREL) 100 MG tablet Take 2 tablets (200 mg total) by mouth at bedtime as needed for sleep.     UNABLE TO FIND Take 1 tablet by mouth 3 (three) times a day. Med Name: DGL PLUS 760 mg  deglycyrrhizinated licorice plus 100 mg glycine               REVIEW OF SYSTEMS:    Currently, no fever, chills, or rigors. Does not have any visual or hearing problems. Denies any chest pain, headaches, palpitations, lightheadedness, dizziness, shortness of breath, or cough. Appetite is good. Denies any GERD symptoms. Denies any difficulty with swallowing, nausea, or vomiting.  Reports abdominal pain,no  diarrhea or constipation. Denies any urinary symptoms. No insomnia. No active bleeding. No rash.       PHYSICAL EXAMINATION:  Vitals:    03/05/19 0601   BP: 100/63   Pulse: 74   Temp: 98.1  F (36.7  C)         GENERAL: Awake, Alert, oriented x3, has episodes of anger and tearfulness this am  HEENT: Head is normocephalic with normal hair distribution. No evidence of trauma. Ears: No acute purulent discharge. Eyes: Conjunctivae pink with no scleral jaundice. Nose: Normal mucosa and septum.  CHEST: No tenderness or deformity, no crepitus  LUNG: Clear to auscultation with good chest expansion. There are no crackles or wheezes, normal AP diameter.  BACK: No kyphosis of the thoracic spine. Symmetric, no curvature, ROM normal, no CVA tenderness, no spinal tenderness   CVS: There is good S1  S2, there are no murmurs, rubs, gallops, or heaves, rhythm is regular.  ABDOMEN: Globular and soft, nontender to palpation, non distended, no masses, no organomegaly, good bowel sounds, no rebound or guarding, no peritoneal signs.   EXTREMITIES: Atraumatic. Full range of motion on both upper and lower extremities, there is no tenderness to palpation, no pedal edema, no cyanosis or clubbing, no calf tenderness, normal cap refill, no joint swelling.  SKIN: Warm and dry, no erythema noted, no rashes or lesions.Surgical puncture wounds to abdomen with steri strips.  NEUROLOGICAL: The patient is oriented to person, place and time. Has anxiety.                    LABS:    Lab Results   Component Value Date    WBC 6.1 03/03/2019    HGB 9.5  (L) 03/03/2019    HCT 28.5 (L) 03/03/2019    MCV 92 03/03/2019     03/03/2019       Results for orders placed or performed during the hospital encounter of 02/13/19   Basic Metabolic Panel   Result Value Ref Range    Sodium 137 136 - 145 mmol/L    Potassium 4.1 3.5 - 5.0 mmol/L    Chloride 106 98 - 107 mmol/L    CO2 26 22 - 31 mmol/L    Anion Gap, Calculation 5 5 - 18 mmol/L    Glucose 88 70 - 125 mg/dL    Calcium 7.9 (L) 8.5 - 10.5 mg/dL    BUN 18 8 - 28 mg/dL    Creatinine 1.16 (H) 0.60 - 1.10 mg/dL    GFR MDRD Af Amer 55 (L) >60 mL/min/1.73m2    GFR MDRD Non Af Amer 45 (L) >60 mL/min/1.73m2           Lab Results   Component Value Date    HGBA1C 5.2 01/26/2018     Vitamin D, Total (25-Hydroxy)   Date Value Ref Range Status   01/04/2019 39.8 30.0 - 80.0 ng/mL Final     No results found for: [id number]    ASSESSMENT/PLAN:  1. Other acute pulmonary embolism without acute cor pulmonale (H)    2. Moderate episode of recurrent major depressive disorder (H)    3. Reflex sympathetic dystrophy      1. PE: Currently anticoagulation is on hold, to follow with hematology for PE and f/u after TCU, IVF may be removed at that time.    2. Depression/anxiety; I am ordering tylenol with vistaril to assist with pain and this may assist her with the anxiety. Spoke to SW and request ACP services as well while in TCU.    3.Reflex Sympathetic Dystrophy: She is requesting increase in MS due to discomfort, see #2. I also told her we cannot assist her with the high doses of MS she is requesting and I suggest she follow back up with pain clinic for some other modalities that may assist her pain plan. She would like to see them, request this get set up with the [facility]      Electronically signed by:  Paz Schaefer CNP  This progress note was completed using Dragon software and there may be grammatical errors.      50  minutes spent of which greater than  30 minutes which was  was face to face communication with the patient about above  plan of care including her POLST , her complex medical condition as reported in her HPI as well as her pain management requests.

## 2021-06-24 NOTE — TELEPHONE ENCOUNTER
[facility] Nurse Triage Telephone Note      Provider: PILAR Dunn  Facility: [facility]    Facility Type: TCU    Caller: Kusum  Call Back Number:  [phone]    Allergies: Ambien [zolpidem]; Campral [acamprosate]; Carbamazepine; Cymbalta [duloxetine]; Lyrica [pregabalin]; Sulfa (sulfonamide antibiotics); Lamotrigine; Amiloride; Amoxicillin; Aspirin; Augmentin [amoxicillin-pot clavulanate]; Blood-group specific substance; Chromium and derivatives; Flexeril [cyclobenzaprine]; Labetalol; Metoprolol; Mirtazapine; Nsaids (non-steroidal anti-inflammatory drug); Other allergy (see comments); Other drug allergy (see comments); Oxycodone; Serotonin; Serzone [nefazodone]; Tolmetin; Tylenol [acetaminophen]; Verapamil; Cortisone; Depakote [divalproex]; and Sulfacetamide sodium    Reason for call: Nurse calling to report BMP result.       Verbal Order/Direction given by Provider: daily weights in AM before breakfast.      Provider giving order: PILAR Dunn    Verbal order given to: Kusum Omalley RN

## 2021-06-24 NOTE — ANESTHESIA PREPROCEDURE EVALUATION
Anesthesia Evaluation      Patient summary reviewed   History of anesthetic complications (PONV)     Airway   Mallampati: II  Neck ROM: full   Pulmonary     breath sounds clear to auscultation  (+) a smoker (former)  (-) rhonchi, wheezes, rales, stridor    ROS comment: PE                         Cardiovascular   Exercise tolerance: > or = 4 METS  (+) hypertension, CAD, , hypercholesterolemia, PVD    (-) murmur  ECG reviewed (2/9/19: sinus robert, 59 bpm)  Rhythm: regular  Rate: normal,    no murmur   ROS comment: Echo 1/26/19:  1. Normal left ventricular size and systolic performance with a visually estimated ejection fraction of 65-70%.   2. No significant valvular heart disease is identified on this study.   3. Normal right ventricular size and systolic performance.   4. The pulmonary artery pressure estimate is within the normal range.     When compared to the prior real-time echocardiogram dated 10 December 2016, there has been little appreciable interval change.     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 Hgb 8 --> 5 (3 unit PRBC) --> 9.5 --> 8.7 (this AM). S/f ex-lap for evacuation of hematoma and definitive repair bleeding source. Pt s/p IR embolization of gonadal vein yesterday.      Other findings:   NPO > 8 hrs        Dental - normal exam     Comment: No loose, chipped, partial, removable or dentures.                           Anesthesia Plan  Planned  "anesthetic: general endotracheal  - GETA  - Arterial line, 2 large bore IVs - Per Dr. Wall \"If her hemoglobin does not increase appropriately after the transfusions, she may need a laparoscopy or laparotomy.  That procedure would involve disrupting the hematoma and the potential for significant additional blood loss.\"  - Given difficult crossmatch, 10 additional units requested by Dr. Wall on 2/23/19. Will not proceed to the OR until confirmation received that blood is available.   - Chronic Pain: Ketamine gtt, Magnesium gtt, IV Tylenol. Will discuss incision plan with surgeon for consenting for TAPs vs epidural.   - Decadron 10, Zofran 4 for antiemesis.   ASA 4   Induction: intravenous     Anesthesia plan special considerations: antiemetics, arterial catheterization, IV therapy two IVs,             "

## 2021-06-24 NOTE — ANESTHESIA POSTPROCEDURE EVALUATION
Patient: Sandy Spencer  Cystoscopy with Retrograde and right stent exchange.  Anesthesia type: MAC    Patient location: [facility] Surgical Floor  Last vitals:   Vitals:    02/16/19 1031   BP: 173/84   Pulse:    Resp:    Temp:    SpO2:      Post vital signs: stable  Level of consciousness: awake and responds to simple questions  Post-anesthesia pain: pain controlled  Post-anesthesia nausea and vomiting: no  Pulmonary: unassisted, return to baseline  Cardiovascular: stable and blood pressure at baseline  Hydration: adequate  Anesthetic events: no    QCDR Measures:  ASA# 11 - Aracelis-op Cardiac Arrest: ASA11B - Patient did NOT experience unanticipated cardiac arrest  ASA# 12 - Aracelis-op Mortality Rate: ASA12B - Patient did NOT die  ASA# 13 - PACU Re-Intubation Rate: ASA13B - Patient did NOT require a new airway mgmt  ASA# 10 - Composite Anes Safety: ASA10A - No serious adverse event    Additional Notes:

## 2021-06-24 NOTE — TELEPHONE ENCOUNTER
ANTICOAGULATION  MANAGEMENT: Discharge Continuity of Care Review    Hospital admission on  2/7/19 - 3/3/19 for Hematuria, Cystoscopy on 2/10/19.    Discharge disposition: TCU    INR Results:       Recent labs: (last 7 days)     02/26/19  0627 02/27/19  0554 02/28/19  0716 03/01/19  0703 03/02/19  0642 03/03/19  0633   INR 1.15* 1.07 1.04 0.99 0.95 0.98       Warfarin inpatient management: Warfarin Held and reversed.    Warfarin discharge instructions: Holding Warfarin until further notice from provider     Medication Changes Affecting Anticoagulation: Yes: N/A Warfarin on hold    Additional Factors Affecting Anticoagulation: Yes: Patient currently holding Warfarin, discharged to TCU    Plan     No adjustment to anticoagulation plan needed    ACM will resume monitoring upon discharge from TCU    Anticoagulation calendar updated    Caitlyn Posey RN

## 2021-06-24 NOTE — TELEPHONE ENCOUNTER
If she is at a skilled nursing facility and recently had surgery, I would recommend that her current attending physician prescribe her any appropriate physical therapy for her medical care.  After she is discharged, she can follow-up with me and we can discuss any additional physical therapy needs at that time

## 2021-06-24 NOTE — ANESTHESIA CARE TRANSFER NOTE
Last vitals:   Vitals:    02/16/19 1221   BP: 123/64   Pulse: 78   Resp: 16   Temp: 36.5  C (97.7  F)   SpO2: 98%     Patient's level of consciousness is awake  Spontaneous respirations: yes  Maintains airway independently: yes  Dentition unchanged: yes  Oropharynx: oropharynx clear of all foreign objects    QCDR Measures:  ASA# 20 - Surgical Safety Checklist: WHO surgical safety checklist completed prior to induction    PQRS# 430 - Adult PONV Prevention: 4558F - Pt received => 2 anti-emetic agents (different classes) preop & intraop  ASA# 8 - Peds PONV Prevention: NA - Not pediatric patient, not GA or 2 or more risk factors NOT present  PQRS# 424 - Aracelis-op Temp Management: 4559F - At least one body temp DOCUMENTED => 35.5C or 95.9F within required timeframe  PQRS# 426 - PACU Transfer Protocol: - Transfer of care checklist used  ASA# 14 - Acute Post-op Pain: ASA14B - Patient did NOT experience pain >= 7 out of 10
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
## 2021-06-24 NOTE — ANESTHESIA POSTPROCEDURE EVALUATION
Patient: Sandy Spencer  ROBOTIC RIGHT NEPHROURETERECTOMY  Anesthesia type: general    Patient location: PACU  Last vitals:   Vitals:    02/20/19 2100   BP: 163/65   Pulse: 84   Resp: 21   Temp:    SpO2: 99%     Post vital signs: stable  Level of consciousness: alert and conversing  Post-anesthesia pain: pain controlled by objective measures  Post-anesthesia nausea and vomiting: no  Pulmonary: supplemental oxygen  Cardiovascular: stable and blood pressure at baseline  Hydration: adequate   Anesthetic events: no    QCDR Measures:  ASA# 11 - Aracelis-op Cardiac Arrest: ASA11B - Patient did NOT experience unanticipated cardiac arrest  ASA# 12 - Aracelis-op Mortality Rate: ASA12B - Patient did NOT die  ASA# 13 - PACU Re-Intubation Rate: ASA13B - Patient did NOT require a new airway mgmt  ASA# 10 - Composite Anes Safety: ASA10A - No serious adverse event    Additional Notes:
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
## 2021-06-24 NOTE — TELEPHONE ENCOUNTER
ANTICOAGULATION  MANAGEMENT: Discharge Continuity of Care Review    Hospital admission on  2/7/19 - currently admitted for hematuria.  Patient was transferred from [facility] to [facility] today.    Discharge disposition: NA    INR Results:       Recent labs: (last 7 days)     02/07/19  1735 02/08/19  1311 02/09/19  1101 02/13/19  0542   INR 2.26* 2.09* 1.58* 1.08       Warfarin inpatient management: Warfarin held    Warfarin discharge instructions: NA       Plan     No adjustment to anticoagulation plan needed    ACM will resume monitoring upon discharge from TCU / Hospital    Anticoagulation calendar updated    Caitlyn Posey RN

## 2021-06-24 NOTE — TELEPHONE ENCOUNTER
Spoke with the patient and gave her Dr. Rees's recommendation. Patient seemed confused when I mentioned taking up to 3 tablets at night, therefore I stated that during our previous call she stated that she was reduced down to 1 - 2 tablets at night by the hospital provider. Patient than corrected me saying while at the hospital, she was taking 3 to 4 tablets a night and it wasn't until she was moved to [facility] that it was changed to 1 -  2 tablets a night.     I recommended patient to speak with the doctor that she will see tomorrow at [facility] to discuss going back to 3 - 4 tablets a night. Patient understood and will discuss at tomorrows visit.

## 2021-06-24 NOTE — TELEPHONE ENCOUNTER
"The patient had bilateral genicular nerve block done on 1/22/19. The pain sheet was returned. Dr. Mittal reviewed the pain sheet and indicated \"good\". Pain sheet to be scanned into the chart. Order placed for bilateral peripheral nerve radiofrequency( genicular). email sent to pa team to start pa process for RF. Once approved will notify staff to schedule radiofrequency.  "

## 2021-06-24 NOTE — TELEPHONE ENCOUNTER
Patient called yesterday from the TCU request to increase the fentanyl patch from 50-75.    I did speak with the provider rounding at TCU today.  She described the patient with dramatic behavioral changes yesterday.  Initially declined up with the fentanyl patch on, it was offered Tylenol, nurse noted it was on her allergy list patient stated she just tells people like this and that she does not want to take it.  She is ambulating, does not appear particularly uncomfortable or impaired by pain.  While in the hospital there is some episodes of sedation or somnolence.    We discussed at the present continue with opioid regimen as ordered, and will reassess.  Patient will not be at the TCU long it appears

## 2021-06-24 NOTE — TELEPHONE ENCOUNTER
RN cannot approve Refill Request    RN can NOT refill this medication Pt currently in patient and asking refill for this med. Last office visit: 2/6/2019 Caitlyn Rees MD Last Physical: 12/4/2018 Last MTM visit: Visit date not found Last visit same specialty: 2/6/2019 Caitlyn Rees MD.  Next visit within 3 mo: Visit date not found  Next physical within 3 mo: Visit date not found      Yue Gore, [facility] [place] Connection Triage/Med Refill 3/1/2019    Requested Prescriptions   Pending Prescriptions Disp Refills     traZODone (DESYREL) 100 MG tablet [Pharmacy Med Name: TRAZODONE 100MG TABLETS] 360 tablet 0     Sig: TAKE 2 TO 4 TABLETS(200  MG) BY MOUTH AT BEDTIME    Tricyclics/Misc Antidepressant/Antianxiety Meds Refill Protocol Passed - 3/1/2019  7:47 AM       Passed - PCP or prescribing provider visit in last year    Last office visit with prescriber/PCP: 2/6/2019 Caitlyn Rees MD OR same dept: 2/6/2019 Caitlyn Rees MD OR same specialty: 2/6/2019 Caitlyn Rees MD  Last physical: 12/4/2018 Last MTM visit: Visit date not found   Next visit within 3 mo: Visit date not found  Next physical within 3 mo: Visit date not found  Prescriber OR PCP: Caitlyn Rees MD  Last diagnosis associated with med order: 1. Insomnia  - traZODone (DESYREL) 100 MG tablet [Pharmacy Med Name: TRAZODONE 100MG TABLETS]; TAKE 2 TO 4 TABLETS(200  MG) BY MOUTH AT BEDTIME  Dispense: 360 tablet; Refill: 0    If protocol passes may refill for 12 months if within 3 months of last provider visit (or a total of 15 months).

## 2021-06-24 NOTE — TELEPHONE ENCOUNTER
New Appointment Needed  What is the reason for the visit:  INF - [facility] - 2/7/19 thru 3/3/19 - right kidney removal  Inpatient/ED Follow Up Appt Request  At what hospital or facility were you seen?: [facility]  What is the reason you were seen?: right kidney removal  What date were you admitted?: date: 02/7/19  What date were you discharged?: date: 03/03/19  What was the recommended timeframe for your follow up appointment?: this week  Provider Preference: PCP only  How soon do you need to be seen?: Thursday the patient is available all day and Friday the patient is available in the afternoon.  Waitlist offered?: No  Okay to leave a detailed message:  Yes    Please call the patient back and assist in scheduling the patient an appointment, thank you.

## 2021-06-24 NOTE — TELEPHONE ENCOUNTER
"Pt calls from [facility] in [place], requesting increase in Fentanyl from 50 mcg to 75 mcg. States Dr Lynn offered this increase \"but I refused.\" States at the time she didn't know her MS would be decreased from 15 mg q 6 hours to 7.5 mg. Has had extensive health problems in the past month. Last visit 01/11, next visit 04/19.  Please advise.    "

## 2021-06-24 NOTE — ANESTHESIA PREPROCEDURE EVALUATION
Anesthesia Evaluation      Patient summary reviewed   History of anesthetic complications (PONV)     Airway   Mallampati: II  Neck ROM: full   Pulmonary     breath sounds clear to auscultation  (+) a smoker (former)  (-) rhonchi, wheezes, rales, stridor    ROS comment: PE                         Cardiovascular   Exercise tolerance: > or = 4 METS  (+) hypertension, CAD, , hypercholesterolemia, PVD    (-) murmur  ECG reviewed (2/9/19: sinus robert, 59 bpm)  Rhythm: regular  Rate: normal,    no murmur   ROS comment: Echo 1/26/19:  1. Normal left ventricular size and systolic performance with a visually estimated ejection fraction of 65-70%.   2. No significant valvular heart disease is identified on this study.   3. Normal right ventricular size and systolic performance.   4. The pulmonary artery pressure estimate is within the normal range.     When compared to the prior real-time echocardiogram dated 10 December 2016, there has been little appreciable interval change.      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                           Anesthesia Plan  Planned anesthetic: general endotracheal    Ketamine gtt  Dilaudid      ASA 3   Induction: intravenous   Anesthetic plan and risks discussed with: patient  Anesthesia plan special considerations: antiemetics, arterial catheterization, IV therapy two IVs,   Post-op plan: routine recovery

## 2021-06-24 NOTE — TELEPHONE ENCOUNTER
Generally medications like this should be decreased as we age. This is one of the many reasons I have talked about having her see a mental health provider.     She can talk to the [facility] provider about this to see if they feel comfortable with at least 3 a night.

## 2021-06-24 NOTE — TELEPHONE ENCOUNTER
"Patient is calling to request another referral from Michael Ramirez DO. She would like a referral for PT \"to begin the RSD recovery process all over.\" She reports she has been in the hospital for all of 2 days since 1/26. 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 Due to the surgery and incisions, she is not able to take baths which would help with her pain. Requesting to have the PT again with Yogi LOO at [place].     She is currently at [facility] in [place]. Phone number is [phone].  "

## 2021-06-24 NOTE — ANESTHESIA PROCEDURE NOTES
Arterial Line  Reason for Procedure: hemodynamic monitoring  Patient location during procedure: OR post-induction  End time: 2/20/2019 4:08 PM  Staffing:  Performing  Anesthesiologist: Sangeetha Holder MD  Sterile Precautions:  sterile barriers used during insertion: cap, mask, sterile gloves, large sheet, and hand hygiene used.  Arterial Line:   Immediately prior to procedure a time out was called to verify the correct patient, procedure, equipment, support staff and site/side marked as required  Laterality: left  Location: radial  Prepped with: ChloroPrep    Needle gauge: 20 G  Number of Attempts: 2  Secured with: tape and transparent dressing  Flushed with: saline  1% lidocaine local anesthesia used for skin prep.   See MAR for additional medications given.  Ultrasound evaluation of access site: yes  Vessel patent by US exam    Concurrent real time visualization of needle entry

## 2021-06-24 NOTE — TELEPHONE ENCOUNTER
Patient friend calls (pallavi ling) requests to have medication list sent to [facility].  Call placed to facility to obtain fax number.  [phone].  [facility] request faxed at this time.

## 2021-06-24 NOTE — TELEPHONE ENCOUNTER
"Spoke with the patient, she is currently in [facility] in [place] and is doing \"as good as can be expected\". She is aware that Dr. Rees is out of the clinic Tuesday and Friday of this week as well as all of next week and should not wait two full weeks to be seen. Advised her that Dr. Rees recommended her to see Dr. William on Friday, apt made for 1:20 PM.     Dr. Rees. Patient wanted your input on her Trazodone prescription. Stated that she has been on Trazodone for 20 years and was always prescribed to take up to 4 tablets at night. The hospital provider from [facility] changed the prescription in the hospital to only 1 to 2 tablets a night and continued to fill it that way. Patient would prefer to take the 4 tablets and does not understand why it would of been changed.   "

## 2021-06-24 NOTE — ANESTHESIA PREPROCEDURE EVALUATION
Anesthesia Evaluation      History of anesthetic complications (PONV)     Airway    Pulmonary                           Cardiovascular   (+) hypertension, CAD, , hypercholesterolemia, PVD       ROS comment: Pulmonary embolus - less than 1 mo ago. IVC filter in place     Neuro/Psych    (+) neuromuscular disease (Lumbar radiculopathy),  CVA , depression, anxiety/panic attacks, chronic pain    Comments: Cluster headache  RSD (reflex sympathetic dystrophy)    Endo/Other    (+) hypothyroidism, arthritis,      GI/Hepatic/Renal    (+)   chronic renal disease, impaired hepatic function    Comments: Chronic pancreatitis      Other findings:     NPO    4 PRBC given since admission       ECHO 1/26/19:    Summary     1. Normal left ventricular size and systolic performance with a visually estimated ejection fraction of 65-70%.   2. No significant valvular heart disease is identified on this study.   3. Normal right ventricular size and systolic performance.   4. The pulmonary artery pressure estimate is within the normal range.        Results for TACO JERRY (MRN [id number]) as of 2/15/2019    2/15/2019 07:21  Sodium: 136  Potassium: 4.1  Chloride: 107  CO2: 25  Anion Gap, Calculation: 4 (L)  BUN: 13  Creatinine: 1.06  GFR MDRD Af Amer: >60  GFR MDRD Non Af Amer: 50 (L)  Calcium: 8.3 (L)  Glucose: 94  Anti-Xa Heparin Assay: 0.43  WBC: 5.8  RBC: 3.78 (L)  Hemoglobin: 11.3 (L)  Hematocrit: 34.2 (L)  MCV: 91  MCH: 29.9  MCHC: 33.0  RDW: 15.5 (H)  Platelets: 181  MPV: 9.2        Dental                         Anesthesia Plan  Planned anesthetic: general endotracheal    ASA 4   Induction: intravenous     Anesthesia plan special considerations: antiemetics, arterial catheterization, IV therapy two IVs,   Post-op plan: routine recovery

## 2021-06-24 NOTE — TELEPHONE ENCOUNTER
Pt returned call. Provider's response given. Pt reports she is leaving the SNF on Wednesday 3/13. She would like to continue PT as this has really helped her in the past. Pt advised to follow-up with Dr. Ramirez to discuss additional therapy needs. Pt has follow-up currently scheduled on 4/9/2019 at the [place] location. This is her preferred location. Confirmed with scheduling that this is the soonest she can be seen. She does not want to come to the [place] location.

## 2021-06-24 NOTE — TELEPHONE ENCOUNTER
[facility] Nurse Triage Telephone Note      Provider: Anusha Painting MD  Facility: [facility]    Facility Type: TCU    Caller: Chantell  Call Back Number:  [phone]    Allergies: Ambien [zolpidem]; Campral [acamprosate]; Carbamazepine; Cymbalta [duloxetine]; Lyrica [pregabalin]; Sulfa (sulfonamide antibiotics); Lamotrigine; Amiloride; Amoxicillin; Aspirin; Augmentin [amoxicillin-pot clavulanate]; Blood-group specific substance; Chromium and derivatives; Flexeril [cyclobenzaprine]; Labetalol; Metoprolol; Mirtazapine; Nsaids (non-steroidal anti-inflammatory drug); Other allergy (see comments); Other drug allergy (see comments); Oxycodone; Serotonin; Serzone [nefazodone]; Tolmetin; Tylenol [acetaminophen]; Verapamil; Cortisone; Depakote [divalproex]; and Sulfacetamide sodium    Reason for call: Nurse calling to report Hgb and UA results.  Patient was c/o lower back pain and was administered morphine, but pain is still 8-9/10.  Temp was 99.0 earlier and at 4pm was 99.6.  BP=98/60, P=80.  Patient is pale, weak, and lethargic.  She would like to be evaluated in the ER.       Verbal Order/Direction given by Provider: Send to ER due to worsening anemia, low grade fever, lethargy, low back pain with history of renal intervention, and malaise.      Provider giving order: Anusha Painting MD    Verbal order given to: Chantell Omalley RN

## 2021-06-24 NOTE — ANESTHESIA CARE TRANSFER NOTE
Last vitals:   Vitals:    02/20/19 1953   BP: 173/80   Pulse: 81   Resp: 16   Temp: 36.7  C (98  F)   SpO2: 100%     Patient's level of consciousness is drowsy  Spontaneous respirations: yes  Maintains airway independently: yes  Dentition unchanged: yes  Oropharynx: oropharynx clear of all foreign objects    QCDR Measures:  ASA# 20 - Surgical Safety Checklist: WHO surgical safety checklist completed prior to induction    PQRS# 430 - Adult PONV Prevention: 4558F - Pt received => 2 anti-emetic agents (different classes) preop & intraop  ASA# 8 - Peds PONV Prevention: NA - Not pediatric patient, not GA or 2 or more risk factors NOT present  PQRS# 424 - Aracelis-op Temp Management: 4559F - At least one body temp DOCUMENTED => 35.5C or 95.9F within required timeframe  PQRS# 426 - PACU Transfer Protocol: - Transfer of care checklist used  ASA# 14 - Acute Post-op Pain: ASA14B - Patient did NOT experience pain >= 7 out of 10
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
## 2021-06-24 NOTE — ANESTHESIA CARE TRANSFER NOTE
Last vitals:   Vitals:    02/16/19 1031   BP: 173/84   Pulse:    Resp:    Temp:    SpO2:      Patient's level of consciousness is awake  Spontaneous respirations: yes  Maintains airway independently: yes  Dentition unchanged: yes  Oropharynx: oropharynx clear of all foreign objects    QCDR Measures:  ASA# 20 - Surgical Safety Checklist: WHO surgical safety checklist completed prior to induction    PQRS# 430 - Adult PONV Prevention: 4558F - Pt received => 2 anti-emetic agents (different classes) preop & intraop  ASA# 8 - Peds PONV Prevention: NA - Not pediatric patient, not GA or 2 or more risk factors NOT present  PQRS# 424 - Aracelis-op Temp Management: 4559F - At least one body temp DOCUMENTED => 35.5C or 95.9F within required timeframe  PQRS# 426 - PACU Transfer Protocol: - Transfer of care checklist used  ASA# 14 - Acute Post-op Pain: ASA14B - Patient did NOT experience pain >= 7 out of 10

## 2021-06-25 ENCOUNTER — COMMUNICATION - HEALTHEAST (OUTPATIENT)
Dept: FAMILY MEDICINE | Facility: CLINIC | Age: 81
End: 2021-06-25

## 2021-06-25 NOTE — TELEPHONE ENCOUNTER
Pt requesting call back from Dr Rees's assistant, Debra, on Monday so she can get on Dr Rees's schedule sometime that week. H.DeGree, CMA

## 2021-06-25 NOTE — PROGRESS NOTES
Occupational Therapy Daily Progress     Patient Name: Sandy Spencer  Date: 5/27/2021  Visit #: 3  Referral Diagnosis: closed fracture of right hand, (fracture of of distal end of right radius as well as right 5th metacarpal)  Referring provider: Caitlyn Rees*  Visit Diagnosis:     ICD-10-CM    1. Pain in finger of right hand  M79.644    2. Right hand weakness  R29.898    3. Decreased activities of daily living (ADL)  Z78.9        Assessment:     Patient is appropriate to continue with skilled occupational therapy intervention, as indicated by initial plan of care. Patient reports that she is improving. She has improved AROM in the right UE.    Goal Status:  Patient Will Demonstrate / Verbalize independence in self-management of condition in: 2 weeks  Patient will be independent with home exercise program in: 2 weeks  Patient will be able to: lift;carry;reach;for grocery shopping;with less pain;in 12 weeks  Patient will perform: housework;with less pain;in 12 weeks  Patient will be able to  & pinch: for meal prep;with less pain;in 12 weeks  Patient will improve hand/finger coordination for: writing;typing;with less pain;in 12 weeks      Plan / Patient Education:     Plan for next visit:  paraffin to right hand, gentle STM, kinesiotape ulnar side of hand     Subjective:     Pain rating at rest: 3  Pain rating with activity: 6      Objective:     RIGHT                  5-6-21 5-27-21                      Strength 22# 37#     3 Point Pinch 11# 12#     Lateral Pinch 14# 15#          Treatment Today: Paraffin to right hand today for 15 minutes. Brachial plexus nerve glides to right UE are going well. Patient has improved AROM and strength. Patient instructed on  and pinch strengthening for right hand.    TREATMENT MINUTES COMMENTS   Evaluation     Self-care/ Home management     Manual therapy     Neuromuscular Re-education     Therapeutic Exercises 10    Paraffin 15    Orthotic Fitting      Total 25    Blank areas are intentional and mean the treatment did not include these items.       Amarilys Jolly  5/27/2021  1:01 PM

## 2021-06-25 NOTE — TELEPHONE ENCOUNTER
Spoke with the patient. She stated she is not feeling much better since her visit to [facility] last week. Patient still feels low / lack of energy. Dr. Rees do you want or need to see patient today?     Otherwise okay to schedule her tomorrow at 1:20 PM?

## 2021-06-25 NOTE — TELEPHONE ENCOUNTER
Referral to Hematology received from Dr. Caitlyn Rees for dx of PE. Patient had an IVC filter placed on 2/14/19.  Discuss anticoagulation and timing of IVC filter removal.  I called Sandy, introduced myself and relayed the purpose of my call.  I noted Sandy had an inpatient consult with Dr. Ellis on 1/28/19 and offered an appt with him.  Sandy lives closer to [facility] and would prefer her care there.  She has been scheduled for a consult with Dr. Floyd on Tuesday, March 26th 2019 at 2:00, RN appt at 1:30.  She was instructed to arrive by 1:30 with completed intake and updated medication list.    Sandy met with an oncologist in the past for w/u of possible colon cancer but no cancer was found.    I mailed the new patient intake, medication list, and appt letter to Cassia's home address per her request.  I shared what she can expect with her appt.  I provided my direct number and invited calls.

## 2021-06-25 NOTE — TELEPHONE ENCOUNTER
Patient aware that Dr. Rees is okay with having patient wait until tomorrow for visit. Patient should be seen at the ER if symptoms worsen or new symptoms come about before apt tomorrow.

## 2021-06-25 NOTE — PROGRESS NOTES
Occupational Therapy Daily Progress     Patient Name: Sandy Spencer  Date: 6/3/2021  Visit #: 4  Referral Diagnosis: closed fracture of right hand, (fracture of of distal end of right radius as well as right 5th metacarpal)  Referring provider: Caitlyn Rees*  Visit Diagnosis:     ICD-10-CM    1. Pain in finger of right hand  M79.644    2. Right hand weakness  R29.898    3. Decreased activities of daily living (ADL)  Z78.9        Assessment:     Patient is appropriate to continue with skilled occupational therapy intervention, as indicated by initial plan of care. Patient reports that she is improving. She would benefit from home unit for paraffin bath to decrease pain and increase motion due to CRPS(RSD) and recent fracture of wrist and hand. Patient did mention that she is concerned because we started today's and last week's appointment 7-10 minutes late. We did work past the end of the appointment window so there was no shortened treatments.     Goal Status:  Patient Will Demonstrate / Verbalize independence in self-management of condition in: 2 weeks  Patient will be independent with home exercise program in: 2 weeks  Patient will be able to: lift;carry;reach;for grocery shopping;with less pain;in 12 weeks  Patient will perform: housework;with less pain;in 12 weeks  Patient will be able to  & pinch: for meal prep;with less pain;in 12 weeks  Patient will improve hand/finger coordination for: writing;typing;with less pain;in 12 weeks      Plan / Patient Education:     Plan for next visit:  paraffin to right hand, gentle STM    Subjective:     Pain rating at rest: 3  Pain rating with activity: 6      Objective:     RIGHT                  5-6-21 5-27-21   Strength 22# 37#     3 Point Pinch 11# 12#     Lateral Pinch 14# 15#          Treatment Today: Paraffin to right hand today for 15 minutes.     Review existing HEP: Brachial plexus nerve glides to right UE are going well. Patient reports  that she has increased pain with  and pinch exercises. she will try to decrease to every other day and see if pain comes down. If not, she will discontinue that exercise.     Applied and instructed on kinesiotape ulnar side of hand to elbow. Demonstrated how to apply kinesiotape using one hand.    TREATMENT MINUTES COMMENTS   Evaluation     Self-care/ Home management     Manual therapy     Neuromuscular Re-education 5    Therapeutic Exercises 3 Review HEP   Paraffin 15    Orthotic Fitting     Total 23    Blank areas are intentional and mean the treatment did not include these items.       Amarilys Jolly  6/3/2021  10:40-11:03 AM

## 2021-06-25 NOTE — TELEPHONE ENCOUNTER
Dr. Rees agrees to services being requested. I called Augusta with [facility] back, she is aware and will get that order placed and started. Patient is aware.

## 2021-06-25 NOTE — TELEPHONE ENCOUNTER
Orders being requested: skilled nursing  2 times per week for 1 week  1 time per week for 3 weeks  Reason service is needed/diagnosis: acute cystitis, medication management, education  When are orders needed by: ASAP  Where to send Orders: Phone:  [phone]  Okay to leave detailed message?  Yes        Orders being requested: home health aid  1 time per week for 4 weeks  Reason service is needed/diagnosis: assist with ADL's and bathing  When are orders needed by: ASAP  Where to send Orders: Phone:  [phone]  Okay to leave detailed message?  Yes          Orders being requested: medical social worker  Reason service is needed/diagnosis: evaluation   When are orders needed by: ASAP  Where to send Orders: Phone:  [phone]  Okay to leave detailed message?  Yes

## 2021-06-25 NOTE — TELEPHONE ENCOUNTER
Who is calling:   Patient   Reason for Call:  Patient calling back to check status of previous note, she is asking that Debra give her a call back this morning as she is needing to get in with Dr. Rees as soon as possible. Thanks  Date of last appointment with primary care: Ana to leave a detailed message: Yes

## 2021-06-25 NOTE — PROGRESS NOTES
ASSESSMENT/PLAN:       1. Chronic pain syndrome  -The patient is continuing to struggle with pain, remains hesitant to take the morphine as she was quite tired in the hospital with this.  She continues to worry about her kidney function as well.  We discussed sparing use of morphine is likely going to be okay with her kidney function, additionally we can have her try a low-dose of Vistaril to see if this helps with her pain and adding in Tylenol which should be safe with her kidney function.  She will plan on following up with the pain clinic as already scheduled for further guidance and evaluation.  - hydrOXYzine HCl (ATARAX) 10 MG tablet; Take 1 tablet (10 mg total) by mouth 3 (three) times a day.  Dispense: 90 tablet; Refill: 1    2. Acquired hypothyroidism  -Given her fatigue, adjustment in renal function I am updating her thyroid function today to make sure things remain well treated.  - Thyroid Stimulating Hormone (TSH)  - T4, Free    3. Essential hypertension  -Blood pressure remains low normal at this time with her current medications.  The patient was taken off of Lasix which was started secondary to fluid retention.  We discussed that I recommend not resuming this particularly with the decrease in renal function.  She should call me if she is a weight gain of more than 5 pounds.  - Comprehensive Metabolic Panel    4. Insomnia, unspecified type  -Discussed with the patient I am comfortable with her going up to 200-400 mg of trazodone at bedtime as needed as she has been on this dose for some time.    5. Anemia due to blood loss, acute  -Due to her acute blood loss she was quite anemic in the hospital, updating her levels today.  - HM1(CBC and Differential)  - HM1 (CBC with Diff)    6. Other acute pulmonary embolism without acute cor pulmonale (H)  -With her history of recurrent blood clots the patient does need to have lifelong anticoagulation however with her acute anemia this was discontinued.  Given how  severe her acute anemia was I am referring her to hematology for further evaluation and assessment and guidance on regulation as well as when to remove her filter.  - Ambulatory referral to Oncology    7. Nausea  -Doing well with the omeprazole, renewing today.  - omeprazole (PRILOSEC) 40 MG capsule; Take 1 capsule (40 mg total) by mouth daily before breakfast.  Dispense: 30 capsule; Refill: 0    8. Anxiety and depression  -Updating trazodone prescription today with her prior dosages she is done well with this in the past.  - traZODone (DESYREL) 100 MG tablet; Take 2-4 tablets (200-400 mg total) by mouth at bedtime as needed for sleep.  Dispense: 120 tablet; Refill: 1    9. Generalized muscle weakness  -The patient continues to have a very poor appetite as well as significant weakness.  Given her decreasing renal function she would do better with the boost rather than the Ensure as this has a lower sodium and potassium.  Provided a prescription for the patient today for once daily and recommended continuing to eat as able.  - food supplemt, lactose-reduced (BOOST BREEZE NUTRITIONAL) 0.04-1.05 gram-kcal/mL Liqd; Take 237 mL by mouth daily.  Dispense: 30 Bottle; Refill: 2    10. Poor appetite  -As stated above  - food supplemt, lactose-reduced (BOOST BREEZE NUTRITIONAL) 0.04-1.05 gram-kcal/mL Liqd; Take 237 mL by mouth daily.  Dispense: 30 Bottle; Refill: 2    11. Dermatitis  -Likely secondary to detergent at the transitional care unit, provided the patient with a prescription for triamcinolone ointment to use as needed in the areas that are itching and she will let me know if this is not improving and I can refer to dermatology.  - triamcinolone (KENALOG) 0.1 % ointment; Apply small amount to affected 2-3 times daily as needed  Dispense: 60 g; Refill: 1      Return in about 2 weeks (around 4/2/2019) for recheck.     40 minutes was spent face-to-face with the patient during this encounter and >50% of this was spent on  counseling and coordination of care. We discussed in depth the topics listed above.         Caitlyn Rees MD      PROGRESS NOTE   3/19/2019    SUBJECTIVE:  Sandy Spencer is a 78 y.o. female  who presents for follow up.     The patient had a prolonged hospital stay from February 13 - March 3 with discharged to transitional care unit, and a repeat ER visit for urinary tract infection on March 13.  She is now been home from the transitional care unit since 14 March and generally does feel like things are somewhat improved.  She does have home care coming out to the house and is wondering if I would feel comfortable with having a nutritionist come out to the house as well.    The patient's prolonged hospital stay was secondary to hematuria after starting anticoagulation for a pulmonary embolus.  She was found to have significant clot in her right kidney and there was concern for possible cancer, she did have a right nephrectomy done and there was thankfully no cancer found.  She then was difficult to manage from a pain perspective, and did have a day prior to discharge of significant obtundation which was thought to be secondary to her narcotics.    Historically she had been on spironolactone but with the removal of her right kidney was told that she should no longer take this, and rather take Lasix for her blood pressure.  This was discontinued in the transitional care unit however given her low blood pressures and decrease in GFR.    Her most important concern is related to her pain management.  The patient is a long-standing patient of the pain clinic, and continues to perseverate on this wish to have increased her fentanyl patch to 75 mcg when offered.  Apparently she was offered again in the hospital to go up to 75 mcg of her fentanyl but she refused that she felt like things were going okay.  She then became quite sedated when she was taking the 15 mg of morphine that have been offered to her and so  her morphine was decreased down to 7.5 mg and since then she has found that her pain seems to be more difficult to manage.  In general she remains hesitant to take any medications given her decreased renal function as she does not want to end up on dialysis.  She has not been taking her morphine which increases her pain, she has not been taking the Vistaril that was provided to her as well.  She does have an appointment with the pain clinic coming up in the next few weeks but in the meantime is concerned about her pain management.    In regards to the urinary tract infection that she was diagnosed with on March 13, with the antibiotics she has felt better now.    She has not been doing ensure. She would worry about this with her kidneys.  She did have boost was she was in the transitional care unit and that tasted very good to her, and she is wondering if she could get a prescription for this.  She is eating very little, and has no interest in food.  She does worry about her nutrition because of this.    She does have dry skin and is itchy as well. She has been using lotion and that isn't helping.  She is unsure what the lotion is called, but states it is a very good one.  Chief Complaint   Patient presents with     Hospital Visit Follow Up     Patient is here today to follow up from her visit to [facility] 2/13-3/3 and 's on 3/13.     Nutrition Counseling     Patient would like to discuss nutrition.          Patient Active Problem List   Diagnosis     Chronic kidney disease (CKD) stage G3a/A1, moderately decreased glomerular filtration rate (GFR) between 45-59 mL/min/1.73 square meter and albuminuria creatinine ratio less than 30 mg/g (H)     Focal glomerular sclerosis     Anxiety and depression     RSD lower limb, bilateral     ADD (attention deficit disorder)     RSD upper limb, right     Other specified hypothyroidism     Osteopenia     Major depression     Hypertension     Insomnia     Mixed hyperlipidemia      Right rotator cuff tear     Cluster headaches     Lumbar radiculopathy     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     Reflex sympathetic dystrophy     Acute encephalopathy     Temporomandibular joint-pain-dysfunction syndrome (TMJ)     Pancreatitis     Localized swelling of lower leg     Medical cannabis use     Acute right-sided low back pain without sciatica     Acquired hypothyroidism     Chronic pain syndrome     Non morbid obesity due to excess calories     Snoring     Inadequate pain control     Diarrhea     Abdominal pain, generalized     Dermatochalasis of left eyelid     Bilateral carotid artery stenosis     Coronary artery disease involving native coronary artery of native heart without angina pectoris     Sinus bradycardia     Other chronic pulmonary embolism without acute cor pulmonale (H)     Splenic infarction     Opioid type dependence, continuous (H)     Hemoptysis     Hematuria     Anemia due to blood loss, acute     Dyslipidemia     Hypocalcemia     Hyponatremia     Other acute pulmonary embolism without acute cor pulmonale (H)     Hydronephrosis     Bladder spasms     Hypotension, unspecified hypotension type     Acute post-operative pain     Acute reaction to stress     Mood disorder due to medical condition     Anxiety disorder due to medical condition     Family relationship problem     History of posttraumatic stress disorder (PTSD)     Generalized muscle weakness     History of blood clots       Current Outpatient Medications   Medication Sig Dispense Refill     cefdinir (OMNICEF) 300 MG capsule Take 1 capsule (300 mg total) by mouth 2 (two) times a day for 10 days. 20 capsule 0     cholecalciferol, vitamin D3, 5,000 unit Tab Take 1 tablet by mouth daily with supper.              fentaNYL (DURAGESIC) 50 mcg/hr Place 1 patch on the skin every third day. 10 patch 0     furosemide (LASIX) 20 MG tablet Take by mouth. (every other day PRN for weight gain of 3# in 24 hours or SBP >180)        GENERLAC 10 gram/15 mL solution TK 30ML PO QD (Patient taking differently: Take 30 mL by mouth daily as needed. TK 30ML PO QD      ) 236 mL 3     hydrOXYzine HCl (ATARAX) 10 MG tablet Take 1 tablet (10 mg total) by mouth 3 (three) times a day. 90 tablet 1     Lactobacillus acidoph-L.bulgar (FLORANEX) 1 million cell Tab tablet Take 1 tablet by mouth daily. 30 tablet 0     levothyroxine (LEVO-T) 50 MCG tablet Take 1 tablet (50 mcg total) by mouth Daily at 6:00 am. 30 tablet 3     naloxone (NARCAN) 4 mg/actuation nasal spray 1 spray (4 mg dose) into one nostril for opioid reversal. Call 911. May repeat if no response in 3 minutes. 1 Box 0     omeprazole (PRILOSEC) 40 MG capsule Take 1 capsule (40 mg total) by mouth daily before breakfast. 30 capsule 0     rosuvastatin (CRESTOR) 40 MG tablet Take 1 tablet (40 mg total) by mouth daily. 30 tablet 3     topiramate (TOPAMAX) 50 MG tablet Take 0.5-1 tablets (25-50 mg total) by mouth 2 (two) times a day. 20 tablet 0     traZODone (DESYREL) 100 MG tablet Take 2-4 tablets (200-400 mg total) by mouth at bedtime as needed for sleep. 120 tablet 1     acetaminophen (TYLENOL) 500 MG tablet Take 650 mg by mouth 3 (three) times a day.              calcium, as carbonate, (TUMS) 200 mg calcium (500 mg) chewable tablet Chew 1 tablet (200 mg total) 3 (three) times a day as needed. 30 tablet 0     ciclopirox (PENLAC) 8 % solution Apply over nail and surrounding skin. Apply daily over previous coat. After seven (7) days, may remove with alcohol and continue cycle. (Patient taking differently: Apply topically daily as needed. Apply over nail and surrounding skin. Apply daily over previous coat. After seven (7) days, may remove with alcohol and continue cycle      ) 6.6 mL 1     diclofenac sodium (VOLTAREN) 1 % Gel Apply 4 g topically. (apply to knees Q AM and Q 2pm and prn.  May self-administer)       diphenoxylate-atropine (LOMOTIL) 2.5-0.025 mg per tablet Take 1 tablet by mouth 4  (four) times a day as needed for diarrhea. 30 tablet 1     food supplemt, lactose-reduced (BOOST BREEZE NUTRITIONAL) 0.04-1.05 gram-kcal/mL Liqd Take 237 mL by mouth daily. 30 Bottle 2     loperamide (IMODIUM) 2 mg capsule Take 2 mg by mouth 4 (four) times a day as needed for diarrhea .        1     morphine (MSIR) 15 MG tablet Take 0.5 tablets (7.5 mg total) by mouth every 6 (six) hours as needed. 20 tablet 0     promethazine (PHENERGAN) 12.5 MG tablet Take 1 tablet (12.5 mg total) by mouth every 6 (six) hours as needed for nausea. 20 tablet 1     senna-docusate (PERICOLACE) 8.6-50 mg tablet Take 2 tablets by mouth daily as needed. 30 tablet 0     SUMAtriptan (IMITREX) 50 MG tablet Take 1 tablet (50 mg total) by mouth every 2 (two) hours as needed for migraine. 27 tablet 1     triamcinolone (KENALOG) 0.1 % ointment Apply small amount to affected 2-3 times daily as needed 60 g 1     Current Facility-Administered Medications   Medication Dose Route Frequency Provider Last Rate Last Dose     denosumab 60 mg (PROLIA 60 mg/ml)  60 mg Subcutaneous Q6 Months Andrei Olivera MD   60 mg at 18 1126       Social History     Tobacco Use   Smoking Status Former Smoker     Packs/day: 1.00     Years: 20.00     Pack years: 20.00     Last attempt to quit: 2000     Years since quittin.2   Smokeless Tobacco Never Used           OBJECTIVE:        Recent Results (from the past 240 hour(s))   Hemoglobin   Result Value Ref Range    Hemoglobin 8.0 (L) 12.0 - 16.0 g/dL   Urinalysis-UC if Indicated   Result Value Ref Range    Color, UA Yellow Colorless, Yellow, Straw, Light Yellow    Clarity, UA Clear Clear    Glucose, UA Negative Negative    Bilirubin, UA Negative Negative    Ketones, UA Negative Negative    Specific Gravity, UA 1.010 1.001 - 1.030    Blood, UA Negative Negative    pH, UA 7.0 4.5 - 8.0    Protein, UA Trace (!) Negative mg/dL    Urobilinogen, UA <2.0 E.U./dL <2.0 E.U./dL, 2.0 E.U./dL    Nitrite, UA  Negative Negative    Leukocytes, UA Moderate (!) Negative    Bacteria, UA Few (!) None Seen hpf    RBC, UA 0-2 None Seen, 0-2 hpf    WBC, UA 10-25 (!) None Seen, 0-5 hpf    Squam Epithel, UA 0-5 None Seen, 0-5 lpf    WBC Clumps Present (!) None Seen   Culture, Urine   Result Value Ref Range    Culture >100,000 col/ml Enterococcus faecalis (!)        Susceptibility    Enterococcus faecalis - SALONI     Ampicillin 2 Sensitive      Nitrofurantoin <=16 Sensitive      Levofloxacin <=0.5 Sensitive      Tetracycline >8 Resistant    Basic Metabolic Panel   Result Value Ref Range    Sodium 134 (L) 136 - 145 mmol/L    Potassium 3.9 3.5 - 5.0 mmol/L    Chloride 108 (H) 98 - 107 mmol/L    CO2 22 22 - 31 mmol/L    Anion Gap, Calculation 4 (L) 5 - 18 mmol/L    Glucose 103 70 - 125 mg/dL    Calcium 8.0 (L) 8.5 - 10.5 mg/dL    BUN 18 8 - 28 mg/dL    Creatinine 1.45 (H) 0.60 - 1.10 mg/dL    GFR MDRD Af Amer 43 (L) >60 mL/min/1.73m2    GFR MDRD Non Af Amer 35 (L) >60 mL/min/1.73m2   HM1 (CBC with Diff)   Result Value Ref Range    WBC 5.6 4.0 - 11.0 thou/uL    RBC 2.73 (L) 3.80 - 5.40 mill/uL    Hemoglobin 8.2 (L) 12.0 - 16.0 g/dL    Hematocrit 25.9 (L) 35.0 - 47.0 %    MCV 95 80 - 100 fL    MCH 30.0 27.0 - 34.0 pg    MCHC 31.7 (L) 32.0 - 36.0 g/dL    RDW 15.2 (H) 11.0 - 14.5 %    Platelets 184 140 - 440 thou/uL    MPV 9.2 8.5 - 12.5 fL    Neutrophils % 64 50 - 70 %    Lymphocytes % 21 20 - 40 %    Monocytes % 11 (H) 2 - 10 %    Eosinophils % 4 0 - 6 %    Basophils % 0 0 - 2 %    Neutrophils Absolute 3.6 2.0 - 7.7 thou/uL    Lymphocytes Absolute 1.2 0.8 - 4.4 thou/uL    Monocytes Absolute 0.6 0.0 - 0.9 thou/uL    Eosinophils Absolute 0.2 0.0 - 0.4 thou/uL    Basophils Absolute 0.0 0.0 - 0.2 thou/uL   Urinalysis-UC if Indicated   Result Value Ref Range    Color, UA Yellow Colorless, Yellow, Straw, Light Yellow    Clarity, UA Clear Clear    Glucose, UA Negative Negative    Bilirubin, UA Negative Negative    Ketones, UA Negative Negative     Specific Gravity, UA 1.010 1.001 - 1.030    Blood, UA Negative Negative    pH, UA 7.0 4.5 - 8.0    Protein, UA 30 mg/dL (!) Negative mg/dL    Urobilinogen, UA <2.0 E.U./dL <2.0 E.U./dL, 2.0 E.U./dL    Nitrite, UA Negative Negative    Leukocytes, UA Moderate (!) Negative    Bacteria, UA Few (!) None Seen hpf    RBC, UA 0-2 None Seen, 0-2 hpf    WBC, UA 25-50 (!) None Seen, 0-5 hpf    Squam Epithel, UA 0-5 None Seen, 0-5 lpf   Culture, Urine   Result Value Ref Range    Culture 50,000-100,000 col/ml Enterococcus species (!)    Thyroid Stimulating Hormone (TSH)   Result Value Ref Range    TSH 1.91 0.30 - 5.00 uIU/mL   T4, Free   Result Value Ref Range    Free T4 1.2 0.7 - 1.8 ng/dL   Comprehensive Metabolic Panel   Result Value Ref Range    Sodium 134 (L) 136 - 145 mmol/L    Potassium 3.4 (L) 3.5 - 5.0 mmol/L    Chloride 106 98 - 107 mmol/L    CO2 17 (L) 22 - 31 mmol/L    Anion Gap, Calculation 11 5 - 18 mmol/L    Glucose 120 70 - 125 mg/dL    BUN 11 8 - 28 mg/dL    Creatinine 1.38 (H) 0.60 - 1.10 mg/dL    GFR MDRD Af Amer 45 (L) >60 mL/min/1.73m2    GFR MDRD Non Af Amer 37 (L) >60 mL/min/1.73m2    Bilirubin, Total 0.4 0.0 - 1.0 mg/dL    Calcium 8.3 (L) 8.5 - 10.5 mg/dL    Protein, Total 5.7 (L) 6.0 - 8.0 g/dL    Albumin 3.0 (L) 3.5 - 5.0 g/dL    Alkaline Phosphatase 95 45 - 120 U/L    AST 14 0 - 40 U/L    ALT 10 0 - 45 U/L   HM1 (CBC with Diff)   Result Value Ref Range    WBC 5.4 4.0 - 11.0 thou/uL    RBC 3.11 (L) 3.80 - 5.40 mill/uL    Hemoglobin 9.2 (L) 12.0 - 16.0 g/dL    Hematocrit 28.5 (L) 35.0 - 47.0 %    MCV 92 80 - 100 fL    MCH 29.5 27.0 - 34.0 pg    MCHC 32.2 32.0 - 36.0 g/dL    RDW 14.0 11.0 - 14.5 %    Platelets 267 140 - 440 thou/uL    MPV 6.8 (L) 7.0 - 10.0 fL    Neutrophils % 56 50 - 70 %    Lymphocytes % 24 20 - 40 %    Monocytes % 8 2 - 10 %    Eosinophils % 11 (H) 0 - 6 %    Basophils % 1 0 - 2 %    Neutrophils Absolute 3.0 2.0 - 7.7 thou/uL    Lymphocytes Absolute 1.3 0.8 - 4.4 thou/uL    Monocytes  Absolute 0.4 0.0 - 0.9 thou/uL    Eosinophils Absolute 0.6 (H) 0.0 - 0.4 thou/uL    Basophils Absolute 0.0 0.0 - 0.2 thou/uL       Vitals:    03/19/19 1323   BP: 90/52   Patient Site: Left Arm   Patient Position: Sitting   Cuff Size: Adult Regular   Pulse: 76   SpO2: 98%   Weight: 132 lb 9.6 oz (60.1 kg)     Weight: 132 lb 9.6 oz (60.1 kg)          Physical Exam:  GENERAL APPEARANCE: A&A, NAD, well hydrated, well nourished  SKIN:  Normal skin turgor, no lesions/rashes   HEENT: moist mucous membranes, no rhinorrhea  NECK: Normal  CV: RRR, no M/G/R   LUNGS: CTAB  ABDOMEN: S&NT, no masses, well-healed incisions on the abdomen  EXTREMITY: Trace ankle edema  NEURO: no gross deficits  Psych: Her affect is anxious, she makes normal eye contact, her thought process and speech pattern are normal, no obvious hallucinations

## 2021-06-25 NOTE — TELEPHONE ENCOUNTER
Medication Question or Clarification  Who is calling: Other: Litzy Salazar RN with [facility]  What medication are you calling about? (include dose and sig) fentanyl   Who prescribed the medication?: Dr Girer with senior services  What is your question/concern?: Litzy Salazar states concurrent use of fentanyl and promethazine is contraindicated as it increases the risk of CNS depression  Pharmacy: [facility] #[id number]  Okay to leave a detailed message?: Yes  Site CMT - Please call the pharmacy to obtain any additional needed information.        Medication Question or Clarification  Who is calling: Other: Litzy Salazar RN with [facility]  What medication are you calling about? (include dose and sig) morphine 7.5 mg every 6 hours for pain  Who prescribed the medication?: Dr Grier with senior services  What is your question/concern?: Litzy Salazar RN states concurrent use of morphine and promethazine could lead to an increased risk of CNS depression.  Pharmacy: [facility] #[id number]  Okay to leave a detailed message?: Yes  Site CMT - Please call the pharmacy to obtain any additional needed information.        Medication Question or Clarification  Who is calling: Other: Litzy Salazar RN with [facility]  What medication are you calling about? (include dose and sig) Levothyroxine  Who prescribed the medication?: Dr Grier with senior services   What is your question/concern?: Litzy Salazar states levothyroxine and Tums are contraindicated as concurrent use could result in a decrease level of thyroid hormones.  Pharmacy: [facility] #[id number]  Okay to leave a detailed message?: Yes  Site CMT - Please call the pharmacy to obtain any additional needed information.

## 2021-06-25 NOTE — TELEPHONE ENCOUNTER
Medication being requested: Fentanyl  75 mcg patch  Change every 48 hours  Last visit date: 4/20.  Provider: BE  Next visit date: 6/15.  Provider: JUANA  Expected follow up: 8 weeks  MTM visit ([facility]) date: [facility]  UDT date: 4/23/21   Agreement date: 5/6/2021    (Last fill date; name; strength; provider; MME; quantity): Last filled  4/30/2021 to start 5/2  Fentanyl 75 Mcg/hr Patch  # 15 patches,  30 day supply, refill due date 6/1  Pertinent between visit information about requested medication (telephone, mychart, prior authorization, concerns, comments): Continue with the fentanyl 75 mcg patch every 48 hours.  Script being sent to provider by nurse- dates and quantity:   Requested Prescriptions     Pending Prescriptions Disp Refills     fentaNYL (DURAGESIC) 75 mcg/hr 15 patch 0     Sig: Place 1 patch on the skin every other day.     Pharmacy cued: [facility]  Standing orders for withdrawal protocol implemented: [facility]ent

## 2021-06-25 NOTE — TELEPHONE ENCOUNTER
Orders being requested: Dietician consult visit  Reason service is needed/diagnosis: Diet questions related to CKD  When are orders needed by: ASAP  Where to send Orders: Phone:  [phone]  Okay to leave detailed message?  Yes - leave name and title if leaving VM

## 2021-06-25 NOTE — TELEPHONE ENCOUNTER
New Appointment Needed  What is the reason for the visit:    Inpatient/ED Follow Up Appt Request  At what hospital or facility were you seen?: [facility]   What is the reason you were seen?: UTI, weak, without energy, Hbg at 10.8.  What date were you admitted?: date: 03/13/19  What date were you discharged?: date: 03/13/19  What was the recommended timeframe for your follow up appointment?: open  Provider Preference: PCP only  How soon do you need to be seen?: next week  Waitlist offered?: No  Okay to leave a detailed message:  Yes

## 2021-06-25 NOTE — TELEPHONE ENCOUNTER
Orders being requested: PT  2 x/wk for 4 wks and 1 x/wk for 4 wks.  Reason service is needed/diagnosis: Strengthening, balance and gait training  When are orders needed by: asap  Where to send Orders: Phone:  Daina at [phone]  Okay to leave detailed message?  Yes

## 2021-06-25 NOTE — PROGRESS NOTES
[facility] Patient Outreach:     Discharge Date::  3/14/19       Reason for TCU stay (discharge diagnosis)::  S/p nephrectomy, UTI, CKD      Are you feeling better, the same or worse since your discharge?:  Patient is feeling worse      Why are you feeling worse?:  Feels not stable on her feet, fatigued, didn't know where she put her medication, son is coming down today from [place], [facility].       As part of your discharge plan, did they discuss home care with you?: Yes        Have your seen them yet, or are they scheduled to visit?: Yes (nurse coming tomorrow at 10am )                Do you have any follow up visits scheduled with your PCP or Specialist?:  No (Has a call out to the office and will get back to her on monday. )          Sorry to hear you're not doing better. Your PCP would like to see you for a follow-up visit. Can we help set that up for your today?: No (already has a call out to her PCP office)

## 2021-06-25 NOTE — TELEPHONE ENCOUNTER
ANTICOAGULATION  MANAGEMENT: Discharge Continuity of Care Review    Emergency room visit on  3/13 for flank pain,abnormal lab.    Discharge disposition: TCU then discharged to home 3/14/19    INR Results:     No results for input(s): INR in the last 168 hours.    Warfarin inpatient management: Noted that Warfarin is currently on hold    Warfarin discharge instructions: Per discharge summary warfarin is currently on hold from the hospital with no start date recommended yet     Medication Changes Affecting Anticoagulation:warfarin is on hold at this time.    Additional Factors Affecting Anticoagulation: No    Plan     Warfarin is currently on hold with no start date recommneded yet    Anticoagulation calendar updated    Litzy Gore RN

## 2021-06-25 NOTE — TELEPHONE ENCOUNTER
Patient obtains fentanyl and morphine from the pain clinic. If she is not demonstrating fatigue at this time, it should be ok to continue with the combination of medications with the promethazine. Please instruct the patient to cautious with this combination.     Please instruct the patient that she should not take the tums within a few hours of her levothyroxine.

## 2021-06-25 NOTE — TELEPHONE ENCOUNTER
Completed Chronic pain;  Covering provider 6/7-7/7  Requested Prescriptions     Signed Prescriptions Disp Refills     fentaNYL (DURAGESIC) 75 mcg/hr 15 patch 0     Sig: Place 1 patch on the skin every other day.     Authorizing Provider: TRENT WATSON

## 2021-06-26 NOTE — PROGRESS NOTES
"Assessment/Plan:     Problem List Items Addressed This Visit     Chronic pain syndrome    Relevant Medications    lamoTRIgine (LAMICTAL) 100 MG tablet    Concussion with loss of consciousness - Primary    Relevant Medications    butalbitaL-acetaminophen (PHRENILIN)  mg Tab per tablet              No follow-ups on file.    Patient Instructions   PLAN:  Continue with the craniosacral therapy to help with symptoms from your concussion.    Dr. Lynn prescribed butalbital in the form of frenulum, to help with your postconcussive headaches.  Anticipated they will gradually resolve.    He will continue with the nortriptyline 10 mg 2 tablets at bedtime.    Continue with the fentanyl 75 mcg patch every 2 days.  Discussed the goal to taper when recovering.    Follow-up with Dr. Lynn in 8 weeks        Subjective:       80 y.o. female The patient has been notified of the following:      \"We have found that certain health care needs can be provided without the need for a face to face visit.  This service lets us provide the care you need with a phone conversation.       I will have full access to your [facility] medical record during this entire phone call.   I will be taking notes for your medical record.      Since this is like an office visit, we will bill your insurance company for this service.       There are potential benefits and risks of telephone visits (e.g. limits to patient confidentiality) that differ from in-person visits.?  Confidentiality still applies for telephone services, and nobody will record the visit.  It is important to be in a quiet, private space that is free of distractions (including cell phone or other devices) during the visit.??      If during the course of the call I believe a telephone visit is not appropriate, you will not be charged for this service\"     Consent has been obtained for this service by care team member: Yes    Follow for lumbar radiculopathy, migraine " "headaches.    Reviewing the record she has been going to physical therapy.    On 5/28 had a brain MRI, showing generalized brain atrophy.    In interview she describes several ways she is \"crummy\".  Her blood pressures is elevated despite medication changes, today 160/102 heart rate 72.  Her kidney doctor is managing her blood pressure with changes in medications.    Reviews continues struggle since her concussion.  She has been going to craniosacral therapy with some benefit.    Has been using some butalbital to help with her headaches.  Reviewed has caffeine which may be elevating the headaches.    She describes having headaches since age 14 when she had a fracture to her nose developed headaches.  Discussed what part may be musculoskeletal versus migraine.    She has been more sensitive to light and her balance has been off since the concussion and not driving.  We discussed the sympathetic response after concussions may relate to this.    Has been using nortriptyline 10 mg tablets 2 tablets at bedtime some benefit.    Reviews frustrations with some of the medications and cost.    Has been taking Tylenol few tablets daily.    Continues with the fentanyl 75 mcg every other day, would gradually like to taper that in the future.      Current Outpatient Medications:      alirocumab (PRALUENT PEN) 75 mg/mL PnIj, Inject 75 mg under the skin every 14 (fourteen) days., Disp: 6 mL, Rfl: 3     amLODIPine (NORVASC) 5 MG tablet, Take 1 tablet (5 mg total) by mouth daily., Disp: 30 tablet, Rfl: 11     apixaban ANTICOAGULANT (ELIQUIS) 5 mg Tab tablet, Take 1 tablet (5 mg total) by mouth 2 (two) times a day., Disp: 180 tablet, Rfl: 3     azelastine (ASTEPRO) 205.5 mcg (0.15 %) Spry nasal spray, two times a day. Use in each nostril as directed, Disp: , Rfl:      carvediloL (COREG) 6.25 MG tablet, Take 12.5 mg by mouth 2 (two) times a day. , Disp: , Rfl:      cholecalciferol, vitamin D3, (VITAMIN D3 ORAL), Take 5,000 Units by " mouth daily., Disp: , Rfl:      coenzyme Q10 (CO Q-10) 100 mg capsule, Take 1 capsule (100 mg total) by mouth daily., Disp: 30 capsule, Rfl: 3     fentaNYL (DURAGESIC) 75 mcg/hr, Place 1 patch on the skin every other day., Disp: 15 patch, Rfl: 0     furosemide (LASIX) 20 MG tablet, Take 20 mg by mouth 2 (two) times a day., Disp: , Rfl:      lamoTRIgine (LAMICTAL) 100 MG tablet, Take 2 tablets (200 mg total) by mouth 2 (two) times a day., Disp: 120 tablet, Rfl: 5     levothyroxine (SYNTHROID, LEVOTHROID) 50 MCG tablet, , Disp: , Rfl:      lisinopriL (PRINIVIL,ZESTRIL) 40 MG tablet, Take 0.5 tablets (20 mg total) by mouth 2 (two) times a day. (Patient taking differently: Take 40 mg by mouth daily. ), Disp: 90 tablet, Rfl: 1     naloxone (NARCAN) 4 mg/actuation nasal spray, 1 spray (4 mg dose) into one nostril for opioid reversal. Call 911. May repeat if no response in 3 minutes., Disp: 1 Box, Rfl: 0     nortriptyline (PAMELOR) 10 MG capsule, Take 1 capsule (10 mg total) by mouth at bedtime for 7 days, THEN 2 capsules (20 mg total) at bedtime., Disp: 60 capsule, Rfl: 2     rosuvastatin (CRESTOR) 40 MG tablet, Take 1 tablet (40 mg total) by mouth at bedtime., Disp: 90 tablet, Rfl: 3     sodium phosphates 133 mL (FLEET ENEMA) 19-7 gram/118 mL Enem rectal enema, as needed. , Disp: , Rfl:      SUMAtriptan (IMITREX) 50 MG tablet, Take 50 mg by mouth every 2 (two) hours as needed. , Disp: , Rfl:      traZODone (DESYREL) 100 MG tablet, Take 4 tablets (400 mg total) by mouth at bedtime. TAKE  4 TABLETS(400 MG) BY MOUTH AT BEDTIME AS NEEDED FOR SLEEP (Patient taking differently: Take 400 mg by mouth at bedtime. TAKE  3 TABLETS(400 MG) BY MOUTH AT BEDTIME AS NEEDED FOR SLEEP), Disp: 120 tablet, Rfl: 5     valACYclovir (VALTREX) 1000 MG tablet, TAKE ONE TABLET BY MOUTH THREE TIMES A DAY FOR 7 DAYS as needed for outbreaks, Disp: 42 tablet, Rfl: 2     zonisamide (ZONEGRAN) 25 MG capsule, Take 1 capsule (25 mg total) by mouth 3  "(three) times a day., Disp: 270 capsule, Rfl: 2     butalbitaL-acetaminophen (PHRENILIN)  mg Tab per tablet, Take 1 tablet by mouth every 4 (four) hours as needed., Disp: 30 each, Rfl: 1     butalbital-acetaminophen-caffeine (FIORICET, ESGIC) -40 mg per tablet, Take 1 tablet by mouth every 4 (four) hours as needed for pain., Disp: 45 tablet, Rfl: 2     butalbital-aspirin-caffeine (FIORINAL) -40 mg per capsule, Take 1 capsule by mouth every 4 (four) hours as needed for pain., Disp: 30 capsule, Rfl: 0    Current Facility-Administered Medications:      teriparatide injection 20 mcg (FORTEO), 20 mcg, Subcutaneous, DAILY, Caroline Sumner, NOMAN    Telephone sounds alert, clear sensorium.  Thought process logical.  Affect is is congruent.  No gross cognitive deficits noted.       Objective:     Vitals:    06/15/21 1254   BP: (!) 169/91   Pulse: 76   Height: 5' 3\" (1.6 m)   PainSc:   5   PainLoc: Back       Assessment, post concussive headaches, encouraged to continue the craniosacral therapy.   He has been using some butalbital to help with the headaches, associated with caffeine may be increasing her blood pressure.  Will change to the preparation of phrenillin..    Total time more than 18 minutes          This note has been dictated using voice recognition software. Any grammatical or context distortions are unintentional and inherent to the software  "

## 2021-06-26 NOTE — PROGRESS NOTES
Progress Notes by Luz Elena Ybarra MD at 9/25/2018  1:10 PM     Author: Luz Elena Ybarra MD Service: -- Author Type: Physician    Filed: 9/25/2018  1:45 PM Encounter Date: 9/25/2018 Status: Signed    : Luz Elena Ybarra MD (Physician)           Click to link to [facility] NOTE       Assessment/Plan:   1.  Coronary artery disease: The patient's coronary CT angiogram was reported moderate to stenosis in LAD.  The patient had no chest pain.  Continue medical treatment, modify risk of factors.  Her LDL was much improved, but not ideally controlled.  Continue to work on that.     2.  Sinus bradycardia: Her heart rate is 72 bpm.  Not on AV tino blockade agents..    3.  Essential hypertension: Her blood pressure has been well controlled.    4.  Dyslipidemia: Her last LDL was much improved after atorvastatin 80 mg daily was changed to Crestor 40 mg daily.  Continue this medication, diet control and increase exercise activity..    5.  Carotid artery stenosis status post left CEA: Ultrasound carotid artery was reported very mild stenosis..    Thank you for the opportunity to be involved in the care of Sandy Spencer. If you have any questions, please feel free to contact me.  I will see the patient again in 12 months.    Much or all of the text in this note was generated through the use of Dragon Dictate voice-to-text software. Errors in spelling or words which seem out of context are unintentional.   Sound alike errors, in particular, may have escaped editing.       History of Present Illness:   It is my pleasure to see Sandy Spencer at the [facility] clinic for routine cardiology follow-up. Sandy Spencer is a 78y.o. female with a medical history of coronary artery disease, essential hypertension, hyperlipidemia, anxiety and depression, pancreatitis, carotid artery stenosis s/p left CEA.    The patient states that she had no chest pain, shortness of breath over last several months.   She does have mild dyspnea on exertion which has been stable.  She could not do exercise due to joint pain.  She had no palpitations, dizziness, orthopnea, PND or leg edema.  She feels fatigued.  Her blood pressure and heart rate are controlled well.  She tolerated to Crestor 40 mg at bedtime.  Her LDL was dropped from 132 to 105.      Her coronary CT angiogram was reported total calcium score 160, moderate to stenosis in LAD and mild disease in RCA and LCx.    Past Medical History:     Patient Active Problem List   Diagnosis   ? Chronic kidney disease (CKD) stage G3a/A1, moderately decreased glomerular filtration rate (GFR) between 45-59 mL/min/1.73 square meter and albuminuria creatinine ratio less than 30 mg/g   ? Focal glomerular sclerosis   ? Anxiety and depression   ? RSD lower limb, bilateral   ? ADD (attention deficit disorder)   ? RSD upper limb, right   ? Other specified hypothyroidism   ? Anxiety   ? Osteopenia   ? Major depression   ? Hypertension   ? Insomnia   ? Mixed hyperlipidemia   ? Right rotator cuff tear   ? Cluster headaches   ? Lumbar radiculopathy   ? 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   ? Reflex sympathetic dystrophy   ? Acute encephalopathy   ? Temporomandibular joint-pain-dysfunction syndrome (TMJ)   ? Pancreatitis   ? Localized swelling of lower leg   ? Medical cannabis use   ? Acute right-sided low back pain without sciatica   ? Acute exacerbation of chronic low back pain   ? Acquired hypothyroidism   ? Chronic pain syndrome   ? Non morbid obesity due to excess calories   ? Snoring   ? Inadequate pain control   ? Diarrhea   ? Abdominal pain   ? Dermatochalasis of left eyelid   ? Bilateral carotid artery stenosis       Past Surgical History:     Past Surgical History:   Procedure Laterality Date   ? APPENDECTOMY     ? BELPHAROPTOSIS REPAIR      bilateral   ? benign breast cyst excision     ? BILE DUCT STENT PLACEMENT     ? BREAST BIOPSY Left     benign   many yrs ago   ?  "CAROTID ENDARTERECTOMY Left 2009   ? CATARACT EXTRACTION Bilateral    ? CHOLECYSTECTOMY     ? COLECTOMY  1978    \"subtotal\"   ? ERCP W/ SPHICTEROTOMY  01/03/2017    Placement of ventral pancreatic duct stent   ? EXPLORATORY LAPAROTOMY  7/2013    after cholecystectomy   ? EXPLORATORY LAPAROTOMY      after cholecystectomy had surgery for \"something that was nicked\", gravely ill and in ICU for 1 month   ? HYSTERECTOMY     ? LUMBAR LAMINECTOMY Left 8/11/2016    Procedure: LEFT L4-5 HEMILAMINECTOMY / MEDIAL FACETECTOMY & FORAMINOTOMY, RIGHT L5-S1 HEMILAMINECTOMY WITH FACETECTOMY & FORAMINOTOMY;  Surgeon: Litzy Patel MD;  Location: [facility] OR;  Service:    ? NECK SURGERY  2010    neck muscle repair   ? SALPINGOOPHORECTOMY Left 1969   ? TONSILLECTOMY  1942   ? TOTAL VAGINAL HYSTERECTOMY  1984       Family History:     Family History   Problem Relation Age of Onset   ? Heart disease Mother    ? Kidney disease Mother    ? Aortic aneurysm Mother    ? Heart disease Father    ? Stroke Father    ? Kidney disease Father         Social History:    reports that she quit smoking about 18 years ago. She has a 20.00 pack-year smoking history. She has never used smokeless tobacco. She reports that she does not drink alcohol or use illicit drugs.    Review of Systems:   General: WNL  Eyes: WNL  Ears/Nose/Throat: WNL  Lungs: WNL  Heart: WNL  Stomach: WNL  Bladder: WNL  Muscle/Joints: WNL  Skin: WNL  Nervous System: WNL  Mental Health: WNL     Blood: WNL    Meds:     Current Outpatient Prescriptions:   ?  aspirin 81 MG EC tablet, Take 81 mg by mouth daily., Disp: , Rfl:   ?  azelastine (ASTEPRO) 0.15 % (205.5 mcg) Spry nasal spray, 1 spray by Left Nare route 2 (two) times a day as needed., Disp: , Rfl:   ?  cholecalciferol, vitamin D3, 5,000 unit Tab, Take 1 tablet by mouth daily., Disp: , Rfl:   ?  diphenhydrAMINE (BENADRYL) 25 mg capsule, Take 25 mg by mouth every 6 (six) hours as needed. , Disp: , Rfl:   ?  fentaNYL " (DURAGESIC) 50 mcg/hr, Place 1 patch on the skin every third day., Disp: 10 patch, Rfl: 0  ?  levothyroxine (SYNTHROID, LEVOTHROID) 50 MCG tablet, Take 1 tablet (50 mcg total) by mouth daily., Disp: 90 tablet, Rfl: 0  ?  loperamide (IMODIUM A-D) 2 mg tablet, Take 1 tablet (2 mg total) by mouth 4 (four) times a day as needed for diarrhea., Disp: 60 tablet, Rfl: 1  ?  rosuvastatin (CRESTOR) 40 MG tablet, Take 1 tablet (40 mg total) by mouth daily., Disp: 90 tablet, Rfl: 3  ?  spironolactone (ALDACTONE) 25 MG tablet, Take 1 tablet (25 mg total) by mouth daily., Disp: 90 tablet, Rfl: 1  ?  topiramate (TOPAMAX) 50 MG tablet, Take 1 tablet (50 mg total) by mouth 2 (two) times a day., Disp: 180 tablet, Rfl: 1  ?  traZODone (DESYREL) 100 MG tablet, TAKE 2 TO 4 TABLETS(200  MG) BY MOUTH AT BEDTIME, Disp: 360 tablet, Rfl: 1  ?  acetylcysteine, bulk, Powd, 600 mg capsules, take orally three times a day, Disp: 42 Bottle, Rfl: 2  ?  fentaNYL (DURAGESIC) 75 mcg/hr, Place 1 patch on the skin every third day., Disp: 10 patch, Rfl: 0  ?  naloxone (NARCAN) 4 mg/actuation nasal spray, 1 spray (4 mg dose) into one nostril for opioid reversal. Call 911. May repeat if no response in 3 minutes., Disp: 1 Box, Rfl: 0  ?  OMEGA-3/DHA/EPA/FISH OIL (FISH OIL-OMEGA-3 FATTY ACIDS) 300-1,000 mg capsule, Take 1.2 g by mouth 2 (two) times a day., Disp: , Rfl:   ?  prazosin (MINIPRESS) 1 MG capsule, One bedtime for 5 nights, may increase to two bedtime for five nights, if needed three bedtime, Disp: 60 capsule, Rfl: 2  ?  psyllium (METAMUCIL) 3.4 gram packet, Take 1 packet by mouth 2 (two) times a day., Disp: , Rfl: 0  ?  SUMAtriptan (IMITREX) 50 MG tablet, Take 1 tablet (50 mg total) by mouth every 2 (two) hours as needed for migraine., Disp: 27 tablet, Rfl: 1    Current Facility-Administered Medications:   ?  denosumab 60 mg (PROLIA 60 mg/ml), 60 mg, Subcutaneous, Q6 Months, Andrei Olivera MD, 60 mg at 08/13/18 1126    Allergies:  "  Campral [acamprosate]; Cymbalta [duloxetine]; Lyrica [pregabalin]; Sulfa (sulfonamide antibiotics); Lamotrigine; Ambien [zolpidem]; Amiloride; Amoxicillin; Aspirin; Augmentin [amoxicillin-pot clavulanate]; Carbamazepine; Chromium and derivatives; Cortisone; Depakote [divalproex]; Flexeril [cyclobenzaprine]; Labetalol; Metoprolol; Mirtazapine; Nsaids (non-steroidal anti-inflammatory drug); Other allergy (see comments); Other drug allergy (see comments); Oxycodone; Serotonin; Serzone [nefazodone]; Tolmetin; Tylenol [acetaminophen]; Verapamil; and Sulfacetamide sodium      Objective:      Physical Exam  147 lb (66.7 kg)  5' 2\" (1.575 m)  Body mass index is 26.89 kg/(m^2).  /62 (Patient Site: Left Arm, Patient Position: Sitting, Cuff Size: Adult Regular)  Pulse 72  Resp 16  Ht 5' 2\" (1.575 m)  Wt 147 lb (66.7 kg)  BMI 26.89 kg/m2    General Appearance:   Awake, Alert, No acute distress.   HEENT:  Pupil equal and reactive to light. No scleral icterus; the mucous membranes were moist.   Neck: No cervical bruits. No JVD. No thyromegaly.     Chest: The spine was straight. The chest was symmetric.   Lungs:   Respirations unlabored; Lungs are clear to auscultation. No crackles. No wheezing.   Cardiovascular:   Regular rhythm and rate, normal first and second heart sounds with no murmurs. No rubs or gallops.    Abdomen:  Soft. No tenderness. Non-distended. Bowels sounds are present   Extremities: Equal tibial pulses. No leg edema.   Skin: No rashes or ulcers. Warm, Dry.   Musculoskeletal: No tenderness. No deformity.   Neurologic: Mood and affect are appropriate. No focal deficits.         EKG:  Personally reivewed  Normal sinus rhythm   Normal ECG   When compared with ECG of 04-OCT-2017 19:41,   No significant change was found     Cardiac Imaging Studies  Holter on 6-:  CONCLUSION:  Normal Holter, symptoms associated with normal sinus rhythm.  No  significant pauses.    Nuclear stress test on " [id number]:    The pharmacologic nuclear stress test is negative for inducible myocardial ischemia or infarction.    The left ventricular ejection fraction is greater than 70%.    There is no prior study available.    ECHO on 12-:    When compared to the previous study dated 2/22/2016, no significant change.    Left Ventricle: Normal size and systolic function.The estimated left ventricular ejection fraction is 65%. This represents a normal ejection fraction. The left ventricular wall thickness is normal. E/e' > 15, suggesting elevated LV filling pressures.    Left Atrium: Left atrial volume is mildly increased.    Tricuspid Valve: Mild tricuspid valve regurgitation. No pulmonary hypertension present. The estimated systolic pulmonary pressure is 28 mmhg.    Coronary CT angiogram on 8-2-2018:    The total Agatston calcium score is 160. A calcium score in this range places the individual in the 75th percentile when compared to an age and gender matched control group and implies a moderate risk of cardiac events in the next ten years.    There is moderate stenosis (50-69%) in the mid left anterior descending artery.    Lab Review   Lab Results   Component Value Date     09/24/2018    K 3.9 09/24/2018     (H) 09/24/2018    CO2 19 (L) 09/24/2018    BUN 13 09/24/2018    CREATININE 0.96 09/24/2018    CALCIUM 8.8 09/24/2018     Lab Results   Component Value Date    WBC 4.2 09/24/2018    HGB 13.2 09/24/2018    HCT 38.7 09/24/2018    MCV 94 09/24/2018     09/24/2018     Lab Results   Component Value Date    CHOL 187 09/24/2018    CHOL 232 (H) 04/24/2018    CHOL 224 (H) 02/26/2018     Lab Results   Component Value Date    HDL 65 09/24/2018    HDL 89 04/24/2018    HDL 71 02/26/2018     Lab Results   Component Value Date    LDLCALC 105 09/24/2018    LDLCALC 132 (H) 04/24/2018    LDLCALC 128 02/26/2018     Lab Results   Component Value Date    TRIG 87 09/24/2018    TRIG 54 04/24/2018    TRIG 126  02/26/2018     Lab Results   Component Value Date    TROPONINI <0.01 10/04/2017     Lab Results   Component Value Date     (H) 12/10/2016     Lab Results   Component Value Date    TSH 0.55 07/23/2018

## 2021-06-26 NOTE — PROGRESS NOTES
Progress Notes by Luz Elena Ybarra MD at 7/26/2018  9:50 AM     Author: Luz Elena Ybarra MD Service: -- Author Type: Physician    Filed: 7/26/2018 10:49 AM Encounter Date: 7/26/2018 Status: Signed    : Luz Elena Ybarra MD (Physician)           Click to link to [facility] HEART [facility] NOTE    Thank you, Dr. Rees, for asking me to see Sandy Spencer in consultation at [facility] to evaluate bradycardia, shortness of breath and chest pain..      Assessment/Plan:   1.  Chest pain and dyspnea on exertion: The patient developed intermittent chest pain and dyspnea on exertion since May 2018.  The patient does have several risk factors including her age, hypertension, hyperlipidemia, and carotid artery stenosis status post left CEA.  She had negative nuclear stress test in May 2017.  Discussed further evaluation.  Coronary CT angiogram is requested.     2.  Sinus bradycardia: I reviewed her Holter monitor on June 28 which was reported normal Holter monitor.  Symptoms was related to normal sinus rhythm.  She is not on AV tino blockade agents.  Her heart rate is 72 beats per minutes today.    3.  Essential hypertension: He is on very low dose of amlodipine 1.25 mg daily, spinal lactone 25 mg daily.    4.  Dyslipidemia: Her last LDL was 132 even she is on Lipitor 80 mg daily.  Based on coronary CT angiogram of findings, may consider to change Lipitor to Crestor.    5.  Carotid artery stenosis status post left CEA: Patient did not have follow-up for last few years.  She complains of lightheadedness and dizziness.  Carotid ultrasound is requested.      Thank you for the opportunity to be involved in the care of Sandy Spencer. If you have any questions, please feel free to contact me.  I will see the patient again in 4 weeks.    Much or all of the text in this note was generated through the use of Dragon Dictate voice-to-text software. Errors in spelling or words which seem out of context  are unintentional.   Sound alike errors, in particular, may have escaped editing.       History of Present Illness:   It is my pleasure to see Sandy Spencer at the [facility] for evaluation of Bradycardia; Shortness of Breath; and Consult (family history). Sandy Spencer is a 77 y.o. female with a medical history of essential hypertension, hyperlipidemia, anxiety and depression, recent history of pancreatitis, carotid artery stenosis s/p left CEA.    The patient is referred to cardiology clinic for evaluation of bradycardia, chest pain, shortness of breath.  The patient states that she developed intermittent chest pain and dyspnea on exertion since May 2018.  She described her chest pain as located anterior chest, aching pain, lasted less than a few minutes, moderate in severity, radiated to left arm sometimes, triggered by activity sometimes, relieved by itself.  She also complains of intermittent dyspnea on exertion and also complains of fatigue.  She had no palpitations, orthopnea, PND.  She complains of intermittent lightheadedness dizziness, but no syncopal episode.  She has occasional mild leg edema.  Her blood pressure and heart rate in normal range today.    Past Medical History:     Patient Active Problem List   Diagnosis   ? Chronic kidney disease (CKD) stage G3a/A1, moderately decreased glomerular filtration rate (GFR) between 45-59 mL/min/1.73 square meter and albuminuria creatinine ratio less than 30 mg/g   ? Focal glomerular sclerosis   ? Anxiety and depression   ? RSD lower limb, bilateral   ? ADD (attention deficit disorder)   ? RSD upper limb, right   ? Other specified hypothyroidism   ? Anxiety   ? Osteopenia   ? Major depression   ? Hypertension   ? Insomnia   ? Mixed hyperlipidemia   ? Right rotator cuff tear   ? Cluster headaches   ? Lumbar radiculopathy   ? 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   ? Reflex sympathetic dystrophy   ? Acute encephalopathy   ?  "Temporomandibular joint-pain-dysfunction syndrome (TMJ)   ? Pancreatitis   ? Localized swelling of lower leg   ? Medical cannabis use   ? Acute right-sided low back pain without sciatica   ? Acute exacerbation of chronic low back pain   ? Acquired hypothyroidism   ? Chronic pain syndrome   ? Non morbid obesity due to excess calories   ? Snoring   ? Inadequate pain control   ? Diarrhea   ? Abdominal pain   ? Dermatochalasis of left eyelid       Past Surgical History:     Past Surgical History:   Procedure Laterality Date   ? APPENDECTOMY     ? BELPHAROPTOSIS REPAIR      bilateral   ? benign breast cyst excision     ? BILE DUCT STENT PLACEMENT     ? BREAST BIOPSY Left     benign   many yrs ago   ? CAROTID ENDARTERECTOMY Left 2009   ? CATARACT EXTRACTION Bilateral    ? CHOLECYSTECTOMY     ? COLECTOMY  1978    \"subtotal\"   ? ERCP W/ SPHICTEROTOMY  01/03/2017    Placement of ventral pancreatic duct stent   ? EXPLORATORY LAPAROTOMY  7/2013    after cholecystectomy   ? EXPLORATORY LAPAROTOMY      after cholecystectomy had surgery for \"something that was nicked\", gravely ill and in ICU for 1 month   ? HYSTERECTOMY     ? LUMBAR LAMINECTOMY Left 8/11/2016    Procedure: LEFT L4-5 HEMILAMINECTOMY / MEDIAL FACETECTOMY & FORAMINOTOMY, RIGHT L5-S1 HEMILAMINECTOMY WITH FACETECTOMY & FORAMINOTOMY;  Surgeon: Litzy Patel MD;  Location: [facility];  Service:    ? NECK SURGERY  2010    neck muscle repair   ? SALPINGOOPHORECTOMY Left 1969   ? TONSILLECTOMY  1942   ? TOTAL VAGINAL HYSTERECTOMY  1984       Family History:     Family History   Problem Relation Age of Onset   ? Heart disease Mother    ? Kidney disease Mother    ? Aortic aneurysm Mother    ? Heart disease Father    ? Stroke Father    ? Kidney disease Father        Social History:    reports that she quit smoking about 18 years ago. She has a 20.00 pack-year smoking history. She has never used smokeless tobacco. She reports that she does not drink alcohol or use " illicit drugs.    Review of Systems:   General: Weight Gain, Night Sweats  Eyes: WNL  Ears/Nose/Throat: Hearing Loss  Lungs: Shortness of Breath, Snoring  Heart: Shortness of Breath with activity, Leg Swelling  Stomach: Constipation, Diarrhea, Nausea  Bladder: WNL  Muscle/Joints: Joint Pain, Muscle Pain  Skin: WNL  Nervous System: Daytime Sleepiness  Mental Health: Depression, Anxiety     Blood: WNL    Meds:     Current Outpatient Prescriptions:   ?  amLODIPine (NORVASC) 2.5 MG tablet, Take 0.5 tablets (1.25 mg total) by mouth daily., Disp: 15 tablet, Rfl: 3  ?  aspirin 81 MG EC tablet, Take 81 mg by mouth daily., Disp: , Rfl:   ?  atorvastatin (LIPITOR) 80 MG tablet, Take 1 tablet (80 mg total) by mouth at bedtime., Disp: 90 tablet, Rfl: 3  ?  azelastine (ASTEPRO) 0.15 % (205.5 mcg) Spry nasal spray, 1 spray by Left Nare route 2 (two) times a day as needed., Disp: , Rfl:   ?  cholecalciferol, vitamin D3, 5,000 unit Tab, Take 1 tablet by mouth daily., Disp: , Rfl:   ?  diphenhydrAMINE (BENADRYL) 25 mg capsule, Take 25 mg by mouth every 6 (six) hours as needed. , Disp: , Rfl:   ?  fentaNYL (DURAGESIC) 50 mcg/hr, Place 1 patch on the skin every third day., Disp: 10 patch, Rfl: 0  ?  levothyroxine (SYNTHROID, LEVOTHROID) 50 MCG tablet, Take 1 tablet (50 mcg total) by mouth daily., Disp: 90 tablet, Rfl: 0  ?  naloxone (NARCAN) 4 mg/actuation nasal spray, 1 spray (4 mg dose) into one nostril for opioid reversal. Call 911. May repeat if no response in 3 minutes., Disp: 1 Box, Rfl: 0  ?  progesterone (PROMETRIUM) 100 MG capsule, Take 1 capsule (100 mg total) by mouth daily., Disp: 30 capsule, Rfl: 1  ?  SUMAtriptan (IMITREX) 50 MG tablet, Take 1 tablet (50 mg total) by mouth every 2 (two) hours as needed for migraine., Disp: 27 tablet, Rfl: 1  ?  topiramate (TOPAMAX) 25 MG tablet, Take 1 tablet (25 mg total) by mouth 2 (two) times a day., Disp: 180 tablet, Rfl: 1  ?  traZODone (DESYREL) 100 MG tablet, TAKE 2 TO 4  "TABLETS(200  MG) BY MOUTH AT BEDTIME, Disp: 360 tablet, Rfl: 0  ?  zolpidem (AMBIEN) 10 mg tablet, Take 1 tablet (10 mg total) by mouth at bedtime as needed for sleep., Disp: 30 tablet, Rfl: 3  ?  acetylcysteine, bulk, Powd, 600 mg capsules, take orally three times a day, Disp: 42 Bottle, Rfl: 2  ?  eszopiclone (LUNESTA) 3 mg tablet, TK 1 T PO IMMEDIATELY BEFORE BEDTIME QPM, Disp: , Rfl: 2  ?  OMEGA-3/DHA/EPA/FISH OIL (FISH OIL-OMEGA-3 FATTY ACIDS) 300-1,000 mg capsule, Take 1.2 g by mouth 2 (two) times a day., Disp: , Rfl:   ?  psyllium (METAMUCIL) 3.4 gram packet, Take 1 packet by mouth 2 (two) times a day., Disp: , Rfl: 0  ?  spironolactone (ALDACTONE) 25 MG tablet, Take 1 tablet (25 mg total) by mouth daily., Disp: 90 tablet, Rfl: 1    Current Facility-Administered Medications:   ?  denosumab 60 mg (PROLIA 60 mg/ml), 60 mg, Subcutaneous, Q6 Months, Andrei Olivera MD, 60 mg at 02/09/18 1505     Allergies:   Campral [acamprosate]; Cymbalta [duloxetine]; Lyrica [pregabalin]; Sulfa (sulfonamide antibiotics); Lamotrigine; Amiloride; Amoxicillin; Aspirin; Augmentin [amoxicillin-pot clavulanate]; Carbamazepine; Chromium and derivatives; Cortisone; Depakote [divalproex]; Flexeril [cyclobenzaprine]; Labetalol; Metoprolol; Mirtazapine; Nsaids (non-steroidal anti-inflammatory drug); Other allergy (see comments); Other drug allergy (see comments); Oxycodone; Serotonin; Serzone [nefazodone]; Tolmetin; Tylenol [acetaminophen]; Verapamil; and Sulfacetamide sodium    Objective:      Physical Exam  153 lb (69.4 kg)  5' 2\" (1.575 m)  Body mass index is 27.98 kg/(m^2).  /82 (Patient Site: Left Arm, Patient Position: Sitting, Cuff Size: Adult Regular)  Pulse 72  Resp 16  Ht 5' 2\" (1.575 m)  Wt 153 lb (69.4 kg)  BMI 27.98 kg/m2    General Appearance:   Awake, Alert, No acute distress.   HEENT:  Pupil equal, reactive to light. No scleral icterus; the mucous membranes were moist. No oral ulcers or thrush.  "   Neck: No cervical bruits. No JVD. No thyromegaly. No lymph node enlargement or tenderness.   Chest: The spine was straight. The chest was symmetric.   Lungs:   Respirations unlabored. Lungs are clear to auscultation. No crackles. No wheezing.   Cardiovascular:   RRR, normal first and second heart sounds with no murmurs. No rubs or gallops.    Abdomen:  Soft. No tenderness. Non-distended. Bowels sounds are present   Extremities: Equal posterior tibial pulses. No leg edema.   Skin: No rashes or ulcers. Warm, Dry.   Musculoskeletal: No tenderness. No deformity.   Neurologic: Mood and affect are appropriate. No focal deficits.         EKG:  Personally reivewed  Normal sinus rhythm   Normal ECG   When compared with ECG of 04-OCT-2017 19:41,   No significant change was found     Cardiac Imaging Studies  Holter on 6-:  CONCLUSION:  Normal Holter, symptoms associated with normal sinus rhythm.  No  significant pauses.    Nuclear stress test on [phone]:    The pharmacologic nuclear stress test is negative for inducible myocardial ischemia or infarction.    The left ventricular ejection fraction is greater than 70%.    There is no prior study available.    ECHO on 12-:    When compared to the previous study dated 2/22/2016, no significant change.    Left Ventricle: Normal size and systolic function.The estimated left ventricular ejection fraction is 65%. This represents a normal ejection fraction. The left ventricular wall thickness is normal. E/e' > 15, suggesting elevated LV filling pressures.    Left Atrium: Left atrial volume is mildly increased.    Tricuspid Valve: Mild tricuspid valve regurgitation. No pulmonary hypertension present. The estimated systolic pulmonary pressure is 28 mmhg.    Lab Review   Lab Results   Component Value Date     (L) 07/25/2018    K 3.8 07/25/2018     07/25/2018    CO2 16 (L) 07/25/2018    BUN 20 07/25/2018    CREATININE 0.96 07/25/2018    CALCIUM 9.1 07/25/2018      Lab Results   Component Value Date    WBC 7.4 07/25/2018    HGB 13.9 07/25/2018    HCT 39.8 07/25/2018    MCV 90 07/25/2018     07/25/2018     Lab Results   Component Value Date    CHOL 232 (H) 04/24/2018    TRIG 54 04/24/2018    HDL 89 04/24/2018     Lab Results   Component Value Date    TROPONINI <0.01 10/04/2017     Lab Results   Component Value Date     (H) 12/10/2016     Lab Results   Component Value Date    TSH 0.55 07/23/2018

## 2021-06-26 NOTE — PATIENT INSTRUCTIONS - HE
PLAN:  Continue with the craniosacral therapy to help with symptoms from your concussion.    Dr. Lynn prescribed butalbital in the form of phrenillin, to help with your postconcussive headaches.  Anticipated they will gradually resolve.    He will continue with the nortriptyline 10 mg 2 tablets at bedtime.    Continue with the fentanyl 75 mcg patch every 2 days.  Discussed the goal to taper when recovering.    Follow-up with Dr. Lynn in 8 weeks

## 2021-06-26 NOTE — PROGRESS NOTES
Sandy Spencer is a 80 y.o. female who is being evaluated via a billable telephone visit.       What phone number would you like to be contacted at? [phone]  How would you like to obtain your AVS? Mail a copy   Pain score: 8  Constant or intermittent: constant  What does your pain feel like: burning, sharp, crushing  Does the pain interfere with:   Work: yes  Walking/distance: yes  Sleep: yes  Daily activities: no  Relationships/social life: yes  Mood: yes  F= 8     Perla Maya LPN

## 2021-06-26 NOTE — TELEPHONE ENCOUNTER
RN cannot approve Refill Request: Rosuvastatin 60mg total/day  Note: current medication list shows Rosuvastation 40mg @ bedtime ordered 6/22/2021 Dr LIN Ybarra.    Message sent to pharmacy     RN can NOT refill this medication medication not on med list. Last office visit: 6/21/2021 Caitlyn Rees MD Last Physical: 10/2/2019 Last MTM visit: Visit date not found Last visit same specialty: 6/21/2021 Caitlyn Rees MD.  Next visit within 3 mo: Visit date not found  Next physical within 3 mo: Visit date not found      Eula Vincent, [facility] Triage/Med Refill 6/22/2021    Requested Prescriptions   Pending Prescriptions Disp Refills     rosuvastatin (CRESTOR) 20 MG tablet [Pharmacy Med Name: ROSUVASTATIN CALCIUM 20MG TABS] 90 tablet 2     Sig: TAKE ONE TABLET BY MOUTH EVERY DAY WITH 40MG TABLET TO EQUAL 60MG DAILY       Statins Refill Protocol (Hmg CoA Reductase Inhibitors) Passed - 6/21/2021  1:10 PM        Passed - PCP or prescribing provider visit in past 12 months      Last office visit with prescriber/PCP: 6/21/2021 Caitlyn Rees MD OR same dept: 6/21/2021 Caitlyn Rees MD OR same specialty: 6/21/2021 Caitlyn Rees MD  Last physical: 10/2/2019 Last MTM visit: Visit date not found   Next visit within 3 mo: Visit date not found  Next physical within 3 mo: Visit date not found  Prescriber OR PCP: Caitlyn Rees MD  Last diagnosis associated with med order: 1. Mixed hyperlipidemia  - rosuvastatin (CRESTOR) 20 MG tablet [Pharmacy Med Name: ROSUVASTATIN CALCIUM 20MG TABS]; TAKE ONE TABLET BY MOUTH EVERY DAY WITH 40MG TABLET TO EQUAL 60MG DAILY  Dispense: 90 tablet; Refill: 2  - rosuvastatin (CRESTOR) 40 MG tablet [Pharmacy Med Name: ROSUVASTATIN CALCIUM 40MG TABS]; TAKE ONE TABLET BY MOUTH AT BEDTIME WITH 20MG TABLET FOR A TOTAL DOSE OF 60MG  Dispense: 90 tablet; Refill: 2    If protocol passes may refill for 12 months if within 3 months of  last provider visit (or a total of 15 months).                rosuvastatin (CRESTOR) 40 MG tablet [Pharmacy Med Name: ROSUVASTATIN CALCIUM 40MG TABS] 90 tablet 2     Sig: TAKE ONE TABLET BY MOUTH AT BEDTIME WITH 20MG TABLET FOR A TOTAL DOSE OF 60MG       Statins Refill Protocol (Hmg CoA Reductase Inhibitors) Passed - 6/21/2021  1:10 PM        Passed - PCP or prescribing provider visit in past 12 months      Last office visit with prescriber/PCP: 6/21/2021 Caitlyn Rees MD OR same dept: 6/21/2021 Caitlyn Rees MD OR same specialty: 6/21/2021 Caitlyn Rees MD  Last physical: 10/2/2019 Last MTM visit: Visit date not found   Next visit within 3 mo: Visit date not found  Next physical within 3 mo: Visit date not found  Prescriber OR PCP: Caitlyn Rees MD  Last diagnosis associated with med order: 1. Mixed hyperlipidemia  - rosuvastatin (CRESTOR) 20 MG tablet [Pharmacy Med Name: ROSUVASTATIN CALCIUM 20MG TABS]; TAKE ONE TABLET BY MOUTH EVERY DAY WITH 40MG TABLET TO EQUAL 60MG DAILY  Dispense: 90 tablet; Refill: 2  - rosuvastatin (CRESTOR) 40 MG tablet [Pharmacy Med Name: ROSUVASTATIN CALCIUM 40MG TABS]; TAKE ONE TABLET BY MOUTH AT BEDTIME WITH 20MG TABLET FOR A TOTAL DOSE OF 60MG  Dispense: 90 tablet; Refill: 2    If protocol passes may refill for 12 months if within 3 months of last provider visit (or a total of 15 months).

## 2021-06-26 NOTE — PROGRESS NOTES
Progress Notes by Rudolph Molina at 10/2/2018  3:00 PM     Author: Rudolph Molina Service: -- Author Type: Physical Therapist    Filed: 10/2/2018  4:00 PM Encounter Date: 10/2/2018 Status: Attested    : Rudolph Molina (Physical Therapist) Cosigner: Michael Ramirez DO at 10/3/2018  7:53 AM    Attestation signed by Michael Ramirez DO at 10/3/2018  7:53 AM    Please follow the therapist's recommendations                [facility] Re-Certification Request    October 2, 2018      Patient: Sandy Spencer  MR Number: [id number]  YOB: 1940  Date of Visit: 10/2/2018      Dear Michael Ramirez, DO:    As you may recall, we have been seeing Sandy Spencer for Physical Therapy of chronic pain .    For therapy to continue, Medicare and/or Medicaid requires periodic physician review of the treatment plan. Please review the summary of the patient's progress and our plan for continued therapy, and verify  that you agree therapy should continue by entering certification dates at the bottom of this note and co-signing this note.    Plan of Care  No Data Recorded    Goal  Pt. will demonstrate/verbalize independence in self-management of condition in : Progressing toward  Pt. will report decreased intensity, frequency of : Progressing toward  Comment:: decrease pain to 2-7/10 with ADLs  Pt. will have improved quality of sleep: Partially met  Comment:: increase sleep to 4-6 hours  Pt. will be able to walk : Progressing toward  Patient will stand : Partially met  Patient will sit: Met  Patient will reach / maintain arm movement: Partially met  Patient will decrease : Progressing toward  by ___ points: 15      If you have any questions or concerns, please don't hesitate to call.    Sincerely,      Rudolph Molina PT, ATC        Physician recommendation:     ___ Follow therapist's recommendation        ___ Modify therapy      *Physician co-signature indicates they certify the need for  these services furnished within this plan and while under their care.          [facility] Daily Progress     Patient Name: Sandy Spencer  Date: 10/2/2018  Visit #: 15/24    Referral Diagnosis: Neck, Back Pain  Referring provider: Michael Ramirez DO  Visit Diagnosis:     ICD-10-CM    1. Diffuse myofascial pain syndrome M79.18    2. Chronic pain syndrome G89.4    3. Cervical radiculitis M54.12    4. Thoracic spine pain M54.6    5. Lumbar radiculopathy M54.16          Assessment:     Patient demonstrates understanding/independence with home program.  Patient is benefitting from skilled physical therapy and is making steady progress toward functional goals.  Patient is appropriate to continue with skilled physical therapy intervention, as indicated by initial plan of care.  She is showing improvement with PT. The progress may be slower but that is to be expected with her co-morbitities.    We have introduced the concept of PNE and she was given a few YouTube clips to watch to reinforce this. We will also dive a little deeper into this concept in the coming visits.      Goals:  Pt. will demonstrate/verbalize independence in self-management of condition in : 12 weeks  Pt. will report decreased intensity, frequency of : Pain;in 12 weeks;Comment  Comment:: decrease pain to 2-7/10 with ADLs  Pt. will have improved quality of sleep: waking less times/night;getting 75-90% of required amount;in 12 weeks;Comment  Comment:: increase sleep to 4-6 hours   Pt. will be able to walk : 30 minutes;on even surfaces;with less difficulty;for household mobility;for community mobility;for exercise/recreation;in 12 weeks  Patient will stand : 30 minutes;with less difficultty;for home chores;in 12 weeks  Patient will sit: 30 minutes;for driving;for eating;for watching TV;with less difficultty;in 12 weeks  Patient will reach / maintain arm movement: overhead;for home chores;for dressing;with less difficulty;in 12 weeks  Patient  "will decrease : TIMA score;by _ points;for improved quality of function;for improved quality of life;in 12 weeks  by ___ points: 15        Plan / Patient Education:     Continue with initial plan of care.    Subjective:     Pain Ratin-10 range  She did see GI MD and they are going to do a scope. They are going to see what it looks like it there with all of her chronic ulcers/bowel issues. She continues to have pain with eating and ends up just drinking bone broth to help with this. She does lose weight with doing this.   She did get an injection in T12 as well lately.    Function: walking she is able to go about 30' but it will zap her with fatigue, standing >hour it will really hurt a lot, sitting >hour will really irritate her sx, overhead reaching is still difficult due to \"my rotator cuff\", sleeping she did get 4 1/2 last night and does still have problems with this.   She almost feels like there is a \"shingles\" pain in her lower back that burns and goes up into her ribs. She is going to get the shingles shot to see if this helps.   She hasn't had the \"blazing hot fire\" pain in the back of her R knee in a while.   She really feels like she feeling like coming to PT has given her \"hope\" and she is very encouraged about everything here.     Modified Oswestry Low Back Pain Disablity Questionnaire  in %: 42  (68 at eval)      Objective:     Neural, LV fascial tensions    Treatment Today     TREATMENT MINUTES COMMENTS   Evaluation     Self-care/ Home management     Manual therapy 25 Fascial release using Strain-Counterstrain of L lumbopelvic-LV, B cranial dura-N   Neuromuscular Re-education     Therapeutic Activity     Therapeutic Exercises 30 AA/PROM to cranium, pelvis, L-T spine, C-spine  Ed on pain and function.   Reviewed function and progress.    Gait training     Modality__________________                Total 55    Blank areas are intentional and mean the treatment did not include these items.       Rudolph" Tracy  10/2/2018

## 2021-06-26 NOTE — PROGRESS NOTES
Occupational Therapy Daily Progress     Patient Name: Sandy Spencer  Date: 6/8/2021  Visit #: 5  Referral Diagnosis: closed fracture of right hand, (fracture of of distal end of right radius as well as right 5th metacarpal)  Referring provider: Caitlyn Rees*  Visit Diagnosis:     ICD-10-CM    1. Pain in finger of right hand  M79.644    2. Right hand weakness  R29.898    3. Decreased activities of daily living (ADL)  Z78.9        Assessment:     Patient is appropriate to continue with skilled occupational therapy intervention, as indicated by initial plan of care. Patient reports that she is improving.     Goal Status:  Patient Will Demonstrate / Verbalize independence in self-management of condition in: 2 weeks  Patient will be independent with home exercise program in: 2 weeks  Patient will be able to: lift;carry;reach;for grocery shopping;with less pain;in 12 weeks  Patient will perform: housework;with less pain;in 12 weeks  Patient will be able to  & pinch: for meal prep;with less pain;in 12 weeks  Patient will improve hand/finger coordination for: writing;typing;with less pain;in 12 weeks      Plan / Patient Education:     Plan for next visit:  paraffin to right hand, gentle STM    Subjective:     Pain rating at rest: 3  Pain rating with activity: 6      Objective:     RIGHT                  5-6-21 5-27-21   Strength 22# 37#     3 Point Pinch 11# 12#     Lateral Pinch 14# 15#          Treatment Today:   Paraffin: Paraffin to right hand today for 15 minutes.     TherEx: Patient reports that shoulder pain continues with  and pinch exercises. Instructed patient to keep elbow at her side and in neutral position when performing  and pinch exercises to see if this eliminates the shoulder pain.    Manual: Patient given X-span finger sleeve for right index finger to decrease mild swelling in the PIP joint. She will wear at night only.     Neuromuscular re-ed: Modified brachial plexus  glides so right arm only raises to 40-50 degrees as patient is having middle deltoid and scapular pain when abducting higher. Applied kinesiotape ulnar side of hand to elbow.    TREATMENT MINUTES COMMENTS   Evaluation     Self-care/ Home management     Manual therapy 4 X-span sleeve   Neuromuscular Re-education 10 kinesiotape and nerve glides   Therapeutic Exercises 7 Modify  and pinch exercises   Paraffin 13    Orthotic Fitting     Total 34    Blank areas are intentional and mean the treatment did not include these items.       Amarilys Jolly  6/8/2021  5:59-6:33 PM

## 2021-06-26 NOTE — TELEPHONE ENCOUNTER
RN cannot approve Refill Request    RN can NOT refill this medication medication not on med list.->. This DOSE not on med list.  Dr Rees approved 40 mg dose on 6/22/21.  Current incoming request is for 20 mg (to be then added to 40 mg).  Needs provider review.  Thank you.   Last office visit: 6/21/2021 Caitlyn Rees MD [facility], [facility] Triage/Med Refill 6/24/2021    Requested Prescriptions   Pending Prescriptions Disp Refills     rosuvastatin (CRESTOR) 20 MG tablet [Pharmacy Med Name: ROSUVASTATIN CALCIUM 20MG TABS] 90 tablet 3     Sig: TAKE ONE TABLET BY MOUTH EVERY DAY WITH 40MG TABLET TO EQUAL 60MG DAILY       Statins Refill Protocol (Hmg CoA Reductase Inhibitors) Passed - 6/23/2021  9:24 AM        Passed - PCP or prescribing provider visit in past 12 months      Last office visit with prescriber/PCP: 6/21/2021 Caitlyn Rees MD OR same dept: 6/21/2021 Caitlyn Rees MD OR same specialty: 6/21/2021 Caitlyn Rees MD  Last physical: 10/2/2019 Last MTM visit: Visit date not found   Next visit within 3 mo: Visit date not found  Next physical within 3 mo: Visit date not found  Prescriber OR PCP: Caitlyn Rees MD  Last diagnosis associated with med order: 1. Mixed hyperlipidemia  - rosuvastatin (CRESTOR) 20 MG tablet [Pharmacy Med Name: ROSUVASTATIN CALCIUM 20MG TABS]; TAKE ONE TABLET BY MOUTH EVERY DAY WITH 40MG TABLET TO EQUAL 60MG DAILY  Dispense: 90 tablet; Refill: 2    If protocol passes may refill for 12 months if within 3 months of last provider visit (or a total of 15 months).

## 2021-06-27 NOTE — PROGRESS NOTES
Progress Notes by Paz Schaefer CNP at 3/13/2019  8:16 AM     Author: Paz Schaefer CNP Service: -- Author Type: Nurse Practitioner    Filed: 3/13/2019  1:42 PM Encounter Date: 3/13/2019 Status: Attested    : Paz Schaefer CNP (Nurse Practitioner) Cosigner: Anusha Painting MBBS at 3/13/2019  3:56 PM    Attestation signed by Anusha Painting MBBS at 3/13/2019  3:56 PM    Agree with discharge plan  Patient advised to closely follow-up with a urologist, nephrologist as well as primary care physician and pain management clinic.                [facility]      NAME:  Sandy Spencer             :  1940  MRN: [id number]  CODE STATUS:  FULL CODE    VISIT TYPE: DISCHARGE SUMMARY  FACILYTY: [facility] [[id number]]                    PRIMARY CARE PROVIDER: Caitlyn Rees MD   HOSPITALIZATION: [facility],  to 3/3/19    DISCHARGE DIAGNOSIS:      1. Chronic kidney disease (CKD) stage G3a/A1, moderately decreased glomerular filtration rate (GFR) between 45-59 mL/min/1.73 square meter and albuminuria creatinine ratio less than 30 mg/g (H)    2. History of blood clots         DISCHARGE MEDICATIONS:         Medication List           Accurate as of 3/13/19  1:42 PM. If you have any questions, ask your nurse or doctor.               CHANGE how you take these medications    ciclopirox 8 % solution  Commonly known as:  PENLAC  Apply over nail and surrounding skin. Apply daily over previous coat. After seven (7) days, may remove with alcohol and continue cycle.  What changed:      how to take this    when to take this    reasons to take this    additional instructions     GENERLAC 10 gram/15 mL solution  Generic drug:  lactulose  TK 30ML PO QD  What changed:      how much to take    how to take this    when to take this    reasons to take this    additional instructions        CONTINUE taking these medications    acetaminophen 500 MG tablet  Commonly known as:   TYLENOL     calcium (as carbonate) 200 mg calcium (500 mg) chewable tablet  Commonly known as:  TUMS  Chew 1 tablet (200 mg total) 3 (three) times a day as needed.     cholecalciferol (vitamin D3) 5,000 unit Tab     diclofenac sodium 1 % Gel  Commonly known as:  VOLTAREN     diphenoxylate-atropine 2.5-0.025 mg per tablet  Commonly known as:  LOMOTIL  Take 1 tablet by mouth 4 (four) times a day as needed for diarrhea.     fentaNYL 50 mcg/hr  Commonly known as:  DURAGESIC  Place 1 patch on the skin every third day.     food supplemt, lactose-reduced Liqd  Commonly known as:  ENSURE ORIGINAL  Take 118 mL by mouth daily.     furosemide 20 MG tablet  Commonly known as:  LASIX     hydrOXYzine HCl 10 MG tablet  Commonly known as:  ATARAX     Lactobacillus acidoph-L.bulgar 1 million cell Tab tablet  Commonly known as:  FLORANEX  Take 1 tablet by mouth daily.     levothyroxine 50 MCG tablet  Commonly known as:  LEVO-T  Take 1 tablet (50 mcg total) by mouth Daily at 6:00 am.     loperamide 2 mg capsule  Commonly known as:  IMODIUM     morphine 15 MG tablet  Commonly known as:  MSIR  Take 0.5 tablets (7.5 mg total) by mouth every 6 (six) hours as needed.     naloxone 4 mg/actuation nasal spray  Commonly known as:  NARCAN  1 spray (4 mg dose) into one nostril for opioid reversal. Call 911. May repeat if no response in 3 minutes.     omeprazole 40 MG capsule  Commonly known as:  PriLOSEC  Take 1 capsule (40 mg total) by mouth daily before breakfast.     promethazine 12.5 MG tablet  Commonly known as:  PHENERGAN  Take 1 tablet (12.5 mg total) by mouth every 6 (six) hours as needed for nausea.     rosuvastatin 40 MG tablet  Commonly known as:  CRESTOR  Take 1 tablet (40 mg total) by mouth daily.     senna-docusate 8.6-50 mg tablet  Commonly known as:  PERICOLACE  Take 2 tablets by mouth daily as needed.     SUMAtriptan 50 MG tablet  Commonly known as:  IMITREX  Take 1 tablet (50 mg total) by mouth every 2 (two) hours as needed for  "migraine.     topiramate 50 MG tablet  Commonly known as:  TOPAMAX  Take 0.5-1 tablets (25-50 mg total) by mouth 2 (two) times a day.     traZODone 100 MG tablet  Commonly known as:  DESYREL  Take 2 tablets (200 mg total) by mouth at bedtime as needed for sleep.        STOP taking these medications    lamoTRIgine 5 MG Tchd  Commonly known as:  LaMICtal  Stopped by:  Paz Schaefer CNP     UNABLE TO FIND  Stopped by:  Paz Schaefer CNP            HISTORY OF PRESENT ILLNESS: Sandy Spencer is a 78 y.o. female being seen for upcoming dc in am. She has several health concerns including back pain and fatigue , feels urinary difficulties may be due to an UTI, I will then obtain a UA and explained this could be treated OP. She also has c/o fatigue, we will do a stat HBG this am to assure she is stable for dc tomorrow. She has a very lengthy and complex medical history. She recently presented to [facility] with hematuria, she had started on anticoagulation d/t a PE. She was then transferred to [facility]. Per her hospital records \" history coronary artery disease, chronic chronic pain syndrome, reflex sympathetic dystonia, subtotal colectomy secondary to bowel obstruction, hypothyroidism, recent recurrent pulmonary embolism on anticoagulation who presented to [facility] 2/7/19 with gross hematuria.  Patient developed gross hematuria after starting on anticoagulation for pulmonary embolism diagnosed 1/26/19. CT with delayed renal imaging revealed \"Stable 2 mm nonobstructing right renal interpolar and 1 mm left renal upper pole nonobstructing calculi. No ureteral calculi. Stable 3 mm amorphous left renal mid to upper pole hyperdensity (series 2, image 34).\"  She underwent underwent cystoscopy on 2/10/19 with cytology concerning for high-grade urothelial cancer.   Right ureter fluid cytology was suspicious for high-grade urothelial carcinoma.  The left ureteral orifice biopsy simply revealed blood clot without any " "evidence of malignancy (of note, the operative report does not mention any interventions on the left ureter -perhaps this was mislabeled?).  She underwent  S/P Right robotic nephrectomy on 2/20.  Surgical pathology exam reported severe hemarthrosis and urothelial atypia, multifocal including scattered foci of urothelial dysplasia.  No diagnostic evidence of in situ or invasive carcinoma.  Ureteral and vascular margins were negative for tumor.  There is acute and chronic pyelonephritis, patchy chronic and acute urethritis, benign tubular cyst.\" Her abdomen is soft and she has several steri strips in place. She is on MS 7.5 mg, reduced in hospiatl from 15 mg q 6 prn and she has anxiety this will not controll her pain.      SKILLED NURSING FACILITY COURSE:  During this TCU stay, patient completed all anticipated goals of therapy.      PHYSICAL EXAMINATION:    Vitals:    03/13/19 1324   BP: 123/75   Pulse: 78   Temp: 99  F (37.2  C)   Weight: 134 lb 3.2 oz (60.9 kg)         GENERAL: Awake, Alert, oriented x3, not in any form of acute distress, answers questions appropriately, follows simple commands, conversant  HEENT: Head is normocephalic with normal hair distribution. No evidence of trauma. Ears: No acute purulent discharge. Eyes: Conjunctivae pink with no scleral jaundice. Nose: Normal mucosa and septum.  CHEST: No tenderness or deformity, no crepitus  LUNG: Clear to auscultation with good chest expansion. There are no crackles or wheezes, normal AP diameter.  BACK: No kyphosis of the thoracic spine. Symmetric, no curvature, ROM normal, no CVA tenderness, no spinal tenderness   CVS: There is good S1  S2, rhythm is regular.  ABDOMEN: Globular and soft, nontender to palpation, non distended, no masses, no organomegaly, good bowel sounds, no rebound or guarding, no peritoneal signs.   EXTREMITIES: Atraumatic. Full range of motion on both upper and lower extremities, there is no tenderness to palpation, positive  pedal " edema,  normal cap refill, no joint swelling.  SKIN: Warm and dry, no erythema noted, no rashes or lesions.  NEUROLOGICAL: The patient is oriented to person, place and time. Strength and sensation are grossly intact. Face is symmetric.      LABS:  All labs reviewed in the nursing home record.        DISCHARGE PLAN: I certify that this patient is under Dr. Painting's care, seen by the NP, and had a face-to-face encounter that meets the physician face-to-face encounter requirements.  The encounter was in whole, or part related to the primary reason for home health.  The Patient is homebound due to: Deconditioned state, CKD and chronic pain, and  it is  taxing and it will take a considerable amount of effort for patient to leave the home.  She is dependent on others for transportation.  The patient is confined to her home and needs intermittent skilled nursing, PT,OT, RN and HHA.  The patient has been under the care of Dr. Painting/NP and Dr. Painting  initiated the establishment of the plan of care.        Patient to be followed by home care for physical therapy to eval and treat for strengthening, balance, endurance, and safety with mobility, and ambulation.  Patient to be followed by home care for occupational therapy to eval and treat for strengthening, ADL needs, adaptive equipment, and safety.  Patient to be followed by home care for nursing services for medication set up and teaching, symptom and disease processes monitoring and education.    Patient to be followed by home care for home health aid services for bathing and ADL needs.  Patient will follow up with PCP within 7  days after discharge for medication mangagment and appropriate lab studies.    Nursing requested to set up MD tejeda with Dr Lang, [facility] nephrologist      Patient received a hard script for  MS 15 mg tabs, 6 day supply sendt from [facility] and hard RX written for an additional 6 tabs.  Facility sending Lyrica and  Duragesic     DC pending  stable HBG today.        Electronically signed by:  Paz Schaefer CNP  This progress note was completed using Dragon software and there may be grammatical errors.      For documentation purposes, chart review, medication management, and discharge coordination of care was greater than 35 minutes

## 2021-06-27 NOTE — PROGRESS NOTES
Progress Notes by Yogi Velazquez PT at 6/12/2019 11:00 AM     Author: Yogi Velazquez PT Service: -- Author Type: Physical Therapist    Filed: 6/12/2019  1:38 PM Encounter Date: 6/12/2019 Status: Attested    : Yogi Velazquez PT (Physical Therapist) Cosigner: Michael Ramirez DO at 6/12/2019  2:50 PM    Attestation signed by Michael Ramirez DO at 6/12/2019  2:50 PM    Follow the therapist's recommendation                [facility]   Knee Initial Evaluation      [facility] Certification Request    June 12, 2019      Patient: Sandy Spencer  MR Number: [id number]  YOB: 1940  Date of Visit: 6/12/2019      Dear Dr. Ramirez    Thank you for this referral.   We are seeing Sandy Spencer for Physical Therapy of the knees and edema.    Medicare and/or Medicaid requires physician review and approval of the treatment plan. Please review the plan of care and verify that you agree with the therapy plan of care by co-signing this note.      Plan of Care  Authorization / Certification Start Date: 06/12/19  Authorization / Certification End Date: 09/10/19  Authorization / Certification Number of Visits: 12  Communication with: Referral Source  Patient Related Instruction: Nature of Condition;Posture;Precautions;Next steps;Treatment plan and rationale;Self Care instruction;Expected outcome;Basis of treatment;Body mechanics  Times per Week: 1-2  Number of Weeks: 12  Number of Visits: 12  Discharge Planning: Self management of exercise and lymphatic  control issues at home  Therapeutic Exercise: ROM;Stretching;Strengthening  Neuromuscular Reeducation: kinesio tape  Manual Therapy: soft tissue mobilization;myofascial release;joint mobilization;muscle energy;strain counterstrain      Goals:  Pt. will demonstrate/verbalize independence in self-management of condition in : 12 weeks  Pt. will be independent with home exercise program in : 12 weeks  Pt. will have improved quality  of sleep: with less pain;waking less times/night;in 6 weeks  Pt. will show improved balance for safer : ambulation;standing;for dressing/grooming;for chores;for community ambulation;for exercise/recreation;independently;in 12 weeks  Pt. will be able to walk : 30 minutes;on uneven/inclined surfaces;1 mile;with less pain;with less difficulty;for community mobility;for exercise/recreation;in 12 weeks  Pt. will bend: to dress;to clean;with less pain;with less difficulty;for self care;for exercise/recreation;in 12 weeks  Patient will ascend / descend: stairs;step;curb;without railing;with recipricol gait;without assistive device;independently;with less pain;with less difficulty;in 12 weeks  Patient will sit: 60 minutes;for driving;for watching TV;for other activity;with less pain;with less difficultty;in 12 weeks  Patient will transfer: sit/stand;supine/sit;floor/stand;for toileting;for in/out of bed;for in/out of chair;for other activity;with less pain;with less difficulty;in 12 weeks    No data recorded      If you have any questions or concerns, please don't hesitate to call.    Sincerely,      Yogi Velazquez, PT        Physician recommendation:     ___ Follow therapist's recommendation        ___ Modify therapy      *Physician co-signature indicates they certify the need for these services furnished within this plan and while under their care.    Patient Name: Sandy Spencer  Date of evaluation: 6/12/2019  Referral Diagnosis: Chronic pain syndrome  Referring provider: Michael Ramirez,   Visit Diagnosis:     ICD-10-CM    1. Chronic pain syndrome G89.4    2. Knee pain, chronic M25.569     G89.29        Assessment:      Pt. is appropriate for skilled PT intervention as outlined in the Plan of Care (POC).  Pt. is a good candidate for skilled PT services to improve pain levels and function.    Goals:  Pt. will demonstrate/verbalize independence in self-management of condition in : 12 weeks  Pt. will be independent  with home exercise program in : 12 weeks  Pt. will have improved quality of sleep: with less pain;waking less times/night;in 6 weeks  Pt. will show improved balance for safer : ambulation;standing;for dressing/grooming;for chores;for community ambulation;for exercise/recreation;independently;in 12 weeks  Pt. will be able to walk : 30 minutes;on uneven/inclined surfaces;1 mile;with less pain;with less difficulty;for community mobility;for exercise/recreation;in 12 weeks  Pt. will bend: to dress;to clean;with less pain;with less difficulty;for self care;for exercise/recreation;in 12 weeks  Patient will ascend / descend: stairs;step;curb;without railing;with recipricol gait;without assistive device;independently;with less pain;with less difficulty;in 12 weeks  Patient will sit: 60 minutes;for driving;for watching TV;for other activity;with less pain;with less difficultty;in 12 weeks  Patient will transfer: sit/stand;supine/sit;floor/stand;for toileting;for in/out of bed;for in/out of chair;for other activity;with less pain;with less difficulty;in 12 weeks    No data recorded    Patient's expectations/goals are realistic.    Barriers to Learning or Achieving Goals:  Chronicity of the problem.  Co-morbidities or other medical factors.  .   Collapse by Default   Collapse by Default           Generalized muscle weakness     Chronic kidney disease (CKD) stage G3a/A1, moderately decreased glomerular filtration rate (GFR) between 45-59 mL/min/1.73 square meter and albuminuria creatinine ratio less than 30 mg/g (H)     Focal glomerular sclerosis     Anxiety and depression     RSD lower limb, bilateral     ADD (attention deficit disorder)     RSD upper limb, right     Osteopenia     Major depression     Hypertension     Insomnia     Mixed hyperlipidemia     Right rotator cuff tear     Cluster headaches     Lumbar radiculopathy     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     Temporomandibular joint-pain-dysfunction  syndrome (TMJ)     Pancreatitis     Localized swelling of lower leg     Acute right-sided low back pain without sciatica     Acquired hypothyroidism     Chronic pain syndrome     Snoring     Diarrhea     Abdominal pain, generalized     Dermatochalasis of left eyelid     Bilateral carotid artery stenosis     Coronary artery disease involving native coronary artery of native heart without angina pectoris     Sinus bradycardia     Other chronic pulmonary embolism without acute cor pulmonale (H)     Splenic infarction     Opioid type dependence, continuous (H)     Hematuria     Anemia due to blood loss, acute     Hydronephrosis     Bladder spasms     Hypotension, unspecified hypotension type     Acute reaction to stress     Anxiety disorder due to medical condition     Family relationship problem     History of posttraumatic stress disorder (PTSD)                Plan / Patient Instructions:      Plan of Care:   Authorization / Certification Start Date: 06/12/19  Authorization / Certification End Date: 09/10/19  Authorization / Certification Number of Visits: 12  Communication with: Referral Source  Patient Related Instruction: Nature of Condition;Posture;Precautions;Next steps;Treatment plan and rationale;Self Care instruction;Expected outcome;Basis of treatment;Body mechanics  Times per Week: 1-2  Number of Weeks: 12  Number of Visits: 12  Discharge Planning: Self management of exercise and lymphatic  control issues at home  Therapeutic Exercise: ROM;Stretching;Strengthening  Neuromuscular Reeducation: kinesio tape  Manual Therapy: soft tissue mobilization;myofascial release;joint mobilization;muscle energy;strain counterstrain      Plan for next visit: Initiate PT per POC     Subjective:        Social information:   Living Situation:apartment and lives alone   Occupation:retired   Work Status:NA   Equipment Available: None and brace Soft Knee braces    History of Present Illness:    Sandy is a 78 y.o. female who  presents to therapy today with complaints of Bilateral Knee pain. Date of onset/duration of symptoms is May 2019. Onset was gradual.for a year Symptoms are constant and getting worse. She reports  an episodic  history of similar symptoms. She describes their previous level of function as limited with numerous secondary diagnosis that precluded any surgical intervention    Pain Ratin  Pain rating at best: 2  Pain rating at worst: 10  Pain description:aching, burning and crushing    Functional limitations are described as occurring with:   balance  bending  personal cares dressing lower body and donning shoes and socks  reaching at shoulder height  performing routine daily activities  sitting for hours but in discomfort at all times.  Need to compress the joints while seated  sleeping  standing 30 min  transitional movements getting in  bed, chair, tub and car, getting out of  bed, chair, tub and car, sit to stand and sit to supine  twisting or turning trunk  walking up to a half of a mile but in pain    Patient reports benefit from:  rest  , pain medication, heat, cold, physical therapy, OTC Brace       Objective:      Note: Items left blank indicates the item was not performed or not indicated at the time of the evaluation.    Patient Outcome Measures :    Lower Extremity Functional Scale (_/80): 24     Scores range from 0-80, where a score of 80 represents maximum function. The minimal clinically important difference is a positive change of 9 points.    Knee Examination  1. Chronic pain syndrome     2. Knee pain, chronic       Involved Side: Bilateral  Posture Observation:      General sitting posture is  fair.  General standing posture is fair.  Lumbopelvic complex: Mild scoliosis  Moderately decreased lumbar lordosis  Pelvic alignment: L SI upslip with anterior rotation of the R illium  Valgus deformity of the knees  Lower extremity:  Knee:  Bilateral genu valgus.  Assistive Device: Soft knee braces  Gait  Observation: Walks with bowed legged gait in an antalgic gait pattern    Pain levels at 5-6/10 to the Knees        Knee ROM:   Date:       Knee ROM ( ) AROM in degrees AROM in degrees AROM in degrees    Right Left Right Left Right Left   Knee Flexion (0-130 ) 0-140 0-132       Knee extension (0 ) 0 0        PROM in degrees PROM in degrees PROM in degrees    Right Left Right Left Right Left   Knee Flexion (0-130 )         Knee extension (0 )            Hip/Knee Strength     Date:      Hip/Knee Strength (/5) MMT MMT MMT    Right Left Right Left Right Left   Hip Flexion G G       Hip Abduction         Hip Adduction G- G-       Hip Extension G G       Hip External Rotation         Hip Internal Rotation         Knee Extension G- G-       Knee Flexion G G           Palpation:Lympaitic restrictions  Lower extremities 3 sec/ cycle  [place] 3 sec  T duct WNL's    4 sec   + Scan Post Neuro LE-s.   Arterial Post Cranial    Knee Special Tests:     Meniscal Tests Right (+/-) Left (+/-) Ligament Tests Right (+/-) Left (+/-)   McMurrays   Lachman     Joint Line Tenderness   Anterior Drawer     Thessaly   Posterior Drawer     Apleys   Posterior sag     Knee OA Right (+/-) Left (+/-) Valgus Stress     1. > 49 y/o  2. Stiffness > 30 minutes  3. Crepitus   4. Bony tenderness  5. Bone enlargement  6. No warmth to the touch   Varus Stress     Other Right (+/-) Left (+/-) Other     Elys   Other     Aayush - -                                      Treatment Today     TREATMENT MINUTES COMMENTS   Evaluation 30    Self-care/ Home management     Manual therapy 30 MFR with OA, L5-SI and SI joint releases, Dural Tube Pull,   SCS to R  SFIB-N   SUR_N,   TIB-N,   LGAS-N<   MGAS-N,   CFIB-N,   DSCI-N,       Releases achieved,   Pain and cold sensation to the R Foot/ Ankle much improved   SI dysf resolved  Edema decreasing to the Lower extremities   Neuromuscular Re-education     Therapeutic Activity     Therapeutic Exercises     Gait training      Modality__________________                Total 60    Blank areas are intentional and mean the treatment did not include these items.       PT Evaluation Code: (Please list factors)  Patient History/Comorbidities:    Collapse by Default   Collapse by Default           Generalized muscle weakness     Chronic kidney disease (CKD) stage G3a/A1, moderately decreased glomerular filtration rate (GFR) between 45-59 mL/min/1.73 square meter and albuminuria creatinine ratio less than 30 mg/g (H)     Focal glomerular sclerosis     Anxiety and depression     RSD lower limb, bilateral     ADD (attention deficit disorder)     RSD upper limb, right     Osteopenia     Major depression     Hypertension     Insomnia     Mixed hyperlipidemia     Right rotator cuff tear     Cluster headaches     Lumbar radiculopathy     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     Temporomandibular joint-pain-dysfunction syndrome (TMJ)     Pancreatitis     Localized swelling of lower leg     Acute right-sided low back pain without sciatica     Acquired hypothyroidism     Chronic pain syndrome     Snoring     Diarrhea     Abdominal pain, generalized     Dermatochalasis of left eyelid     Bilateral carotid artery stenosis     Coronary artery disease involving native coronary artery of native heart without angina pectoris     Sinus bradycardia     Other chronic pulmonary embolism without acute cor pulmonale (H)     Splenic infarction     Opioid type dependence, continuous (H)     Hematuria     Anemia due to blood loss, acute     Hydronephrosis     Bladder spasms     Hypotension, unspecified hypotension type     Acute reaction to stress     Anxiety disorder due to medical condition     Family relationship problem     History of posttraumatic stress disorder (PTSD)             Examination: Lymphatic Arterial  Neurological  Clinical Presentation: Evolving  Clinical Decision Making: Moderate    Patient History/  Comorbidities Examination  (body  structures and functions, activity limitations, and/or participation restrictions) Clinical Presentation Clinical Decision Making (Complexity)   No documented Comorbidities or personal factors 1-2 Elements Stable and/or uncomplicated Low   1-2 documented comorbidities or personal factor 3 Elements Evolving clinical presentation with changing characteristics Moderate   3-4 documented comorbidities or personal factors 4 or more Unstable and unpredictable High            Yogi Velazquez  6/12/2019  11:05 AM

## 2021-06-27 NOTE — PROGRESS NOTES
Progress Notes by Yogi Velazquez PT at 8/19/2019 11:00 AM     Author: Yogi Velazquez PT Service: -- Author Type: Physical Therapist    Filed: 9/11/2019  1:56 PM Encounter Date: 8/19/2019 Status: Attested Addendum    : Yogi Velazquez PT (Physical Therapist)    Related Notes: Original Note by Yogi Velazquez PT (Physical Therapist) filed at 8/19/2019 12:10 PM    Cosigner: Michael Ramirez DO at 9/11/2019  2:12 PM    Attestation signed by Michael Ramirez DO at 9/11/2019  2:12 PM    Follow the therapist's recommendation                [facility] Daily Progress   [facility] Re-Certification Request    September 11, 2019      Patient: Sandy Spencer  MR Number: [id number]  YOB: 1940  Date of Visit: 8/19/2019  Onset Date:  5/2018  Date of Eval: 6/12/19    Dear Dr. Ramirez:    As you may recall, we have been seeing Sandy Spencer for Physical Therapy of Bilateral Knee Pain,   RSD of the Lower extremities,  .    For therapy to continue, Medicare and/or Medicaid requires periodic physician review of the treatment plan. Please review the summary of the patient's progress and our plan for continued therapy, and verify  that you agree therapy should continue by entering certification dates at the bottom of this note and co-signing this note.    Plan of Care    Authorization / Certification Start Date: 8/19/19  Authorization / Certification End Date:11/1/19  Authorization / Certification Number of Visits: 12  Communication with: Referral Source  Patient Related Instruction: Nature of Condition;Posture;Precautions;Next steps;Treatment plan and rationale;Self Care instruction;Expected outcome;Basis of treatment;Body mechanics  Times per Week: 1-2  Number of Weeks: 12  Number of Visits: 12  Discharge Planning: Self management of exercise and lymphatic  control issues at home  Therapeutic Exercise: ROM;Stretching;Strengthening  Neuromuscular Reeducation: kinesio  tape  Manual Therapy: soft tissue mobilization;myofascial release;joint mobilization;muscle energy;strain counterstrain      Goal  Pt. will demonstrate/verbalize independence in self-management of condition in : 12 weeks  Progressing  Pt. will be independent with home exercise program in : 12 weeks    Partially Met  Pt. will have improved quality of sleep: with less pain;waking less times/night;in 6 weeks     Partially Met  Pt. will show improved balance for safer : ambulation;standing;for dressing/grooming;for chores;for community ambulation;for exercise/recreation;independently;in 12 weeks     Met  Pt. will be able to walk : 30 minutes;on uneven/inclined surfaces;1 mile;with less pain;with less difficulty;for community mobility;for exercise/recreation;in 12 weeks     Progressing  Pt. will bend: to dress;to clean;with less pain;with less difficulty;for self care;for exercise/recreation;in 12 weeks     Partially Met  Patient will ascend / descend: stairs;step;curb;without railing;with recipricol gait;without assistive device;independently;with less pain;with less difficulty;in 12 weeks     Partially Met  Patient will sit: 60 minutes;for driving;for watching TV;for other activity;with less pain;with less difficultty;in 12 weeks     Progressing   Patient will transfer: sit/stand;supine/sit;floor/stand;for toileting;for in/out of bed;for in/out of chair;for other activity;with less pain;with less difficulty;in 12 weeks    Partially Met    If you have any questions or concerns, please don't hesitate to call.    Sincerely,      Yogi Velazquez, PT        Physician recommendation:     ___ Follow therapist's recommendation        ___ Modify therapy      *Physician co-signature indicates they certify the need for these services furnished within this plan and while under their care.      Patient Name: Sandy Spencer  Date: 8/19/2019  Visit #: 11       Referral Diagnosis: B knee pain HA;s Neck Pain  Referring provider:  Michael Ramirez, DO  Visit Diagnosis: R LE pain, R Knee Pain    ICD-10-CM    1. 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 Chronic pain of right knee M25.561     G89.29    3. Lumbar radiculopathy M54.16    4. Complex regional pain syndrome type 1 of both lower extremities G90.523    5. Chronic pain syndrome G89.4    6. Chronic cluster headache, not intractable G44.029    7. Temporomandibular joint-pain-dysfunction syndrome (TMJ) M26.629    8. Localized swelling of lower leg R22.40          Assessment:     Patient is benefitting from skilled physical therapy and is making steady progress toward functional goals.  Patient is appropriate to continue with skilled physical therapy intervention, as indicated by initial plan of care.     Therapy results      Pt did have improvement in her fascial tensions with treatment. This should help to improve sinus drainage and decrease HA's. Over All  Pt benefits from therapy in managing her RSD condition to the LE's especially on the R side.  Pt better able to walk with the reduced pain and exhibits better standing tolerance. Pt will be scheduling for additional cortisone injections at the pain clinic      Goal Status:  Pt. will demonstrate/verbalize independence in self-management of condition in : 12 weeks  Pt. will be independent with home exercise program in : 12 weeks  Pt. will have improved quality of sleep: with less pain;waking less times/night;in 6 weeks  Pt. will show improved balance for safer : ambulation;standing;for dressing/grooming;for chores;for community ambulation;for exercise/recreation;independently;in 12 weeks  Pt. will be able to walk : 30 minutes;on uneven/inclined surfaces;1 mile;with less pain;with less difficulty;for community mobility;for exercise/recreation;in 12 weeks  Pt. will bend: to dress;to clean;with less pain;with less difficulty;for self care;for exercise/recreation;in 12 weeks  Patient will ascend / descend:  stairs;step;curb;without railing;with recipricol gait;without assistive device;independently;with less pain;with less difficulty;in 12 weeks  Patient will sit: 60 minutes;for driving;for watching TV;for other activity;with less pain;with less difficultty;in 12 weeks  Patient will transfer: sit/stand;supine/sit;floor/stand;for toileting;for in/out of bed;for in/out of chair;for other activity;with less pain;with less difficulty;in 12 weeks    No data recorded    Plan / Patient Education:     Plan to con't with manual therapy to decrease fascial tension/tone to normalize ROM/decrease inflammation/decrease mm tone and improve proprioception.      Subjective:     Pain Ratin/10  L low back & LE   No neck or HA Pain  L TMJ 3/10 No clicking to L TMJ for 4 hrs after taping then the symptoms returned return            Objective:     R SI down slip   TMJ restriction   O2 conc 98 befor and after treatment    Treatment Today   2019   TREATMENT MINUTES COMMENTS   Evaluation     Self-care/ Home management          Manual therapy 60 MFR with OA, L5-SI and SI joint releases, Dural Tube Pull.MFR   SCS to L  PGT9,10,11,12-N,   PS2,3-N,   Sigmoid Med, Lat, Inf,  L OBT-N,  DOBT-N,   NAOMI-A, PARTH-N,   PUD-N,   SGL-A L,    KTing of Bilat sciatic nerves to include thoracic paraspinals,  Bilat knees     Pain levels at all sites reduced to 0/10   SI dysf resolved     Neuromuscular Re-education     Therapeutic Activity     herapeutic Exercises     Gait training     Modality__________________                Total 60    Blank areas are intentional and mean the treatment did not include these items.       Yogi Velazquez  2019

## 2021-06-28 NOTE — PROGRESS NOTES
Progress Notes by Luz Elena Ybarra MD at 11/4/2019 11:10 AM     Author: Luz Elena Ybarra MD Service: -- Author Type: Physician    Filed: 11/4/2019 11:48 AM Encounter Date: 11/4/2019 Status: Signed    : Luz Elena Ybarra MD (Physician)           Click to link to [facility] HEART [facility] NOTE       Assessment/Plan:   1.  Coronary artery disease: The patient's coronary CT angiogram was reported moderate to stenosis in LAD.  The patient's needle sticking chest pain most likely is related to gastric or reflux disease, not cardiac etiology.  Continue to monitor it.       2.  Dyslipidemia: Her recent LDL was 122, total cholesterol 220.  The patient has CAD and carotid artery stenosis status post left CEA.  Her LDL is not controlled at target.  We discussed the management.  The patient states that she has been taking Crestor 40 mg at night every day.  Increase to Crestor to 60 mg at bedtime and repeat lipid profile and liver function in 2 months.  The patient already has diet control.    3.  Carotid artery stenosis status post left CEA: The patient complains of lightheadedness, but no dizziness or syncope.  Ultrasound carotid artery is requested for evaluation of for carotid artery stenosis.    4.  Pulmonary embolism on warfarin, stage III CKD and other chronic medical issues: Continue to follow-up with Dr. Rees.    Thank you for the opportunity to be involved in the care of Sandy Spencer. If you have any questions, please feel free to contact me.  I will see the patient again in 12 months.    Much or all of the text in this note was generated through the use of Dragon Dictate voice-to-text software. Errors in spelling or words which seem out of context are unintentional.   Sound alike errors, in particular, may have escaped editing.       History of Present Illness:   It is my pleasure to see Sandy Spencer at the [facility] clinic for routine cardiology follow-up. Sandy Spencer is a 79 y.o.  female with a medical history of coronary artery disease, essential hypertension, hyperlipidemia, anxiety and depression, pancreatitis, carotid artery stenosis s/p left CEA, pulmonary embolism on warfarin, status post right nephrectomy and chronic kidney disease stage III.    The patient states that she had occasional sharp chest pain, located left low chest, lasted for 1 second, needle sticking pain, not associated with exertion or shortness of breath.  No radiation.  Occasional occurred.  She did not have complaints of shortness of breath, palpitations.  She has palpitations, orthopnea, PND or leg edema.  She complains of frequent lightheadedness,, but no dizziness or syncope.  Her blood pressure and heart rate are controlled.  She has no side effects from her current medications.  Her LDL is not controlled, 122 on October 31, 2019.    Past Medical History:     Patient Active Problem List   Diagnosis   ? Chronic kidney disease (CKD) stage G3a/A1, moderately decreased glomerular filtration rate (GFR) between 45-59 mL/min/1.73 square meter and albuminuria creatinine ratio less than 30 mg/g (H)   ? Focal glomerular sclerosis   ? Anxiety and depression   ? RSD lower limb, bilateral   ? ADD (attention deficit disorder)   ? RSD upper limb, right   ? Osteopenia   ? Major depression   ? Hypertension   ? Insomnia   ? Mixed hyperlipidemia   ? Right rotator cuff tear   ? Cluster headaches   ? Lumbar radiculopathy   ? 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   ? Temporomandibular joint-pain-dysfunction syndrome (TMJ)   ? Pancreatitis   ? Localized swelling of lower leg   ? Acquired hypothyroidism   ? Chronic pain syndrome   ? Snoring   ? Diarrhea   ? Abdominal pain, generalized   ? Dermatochalasis of left eyelid   ? Bilateral carotid artery stenosis   ? Coronary artery disease involving native coronary artery of native heart without angina pectoris   ? Sinus bradycardia   ? Other chronic pulmonary embolism without  "acute cor pulmonale (H)   ? Splenic infarction   ? Opioid type dependence, continuous (H)   ? Hematuria   ? Anemia due to blood loss, acute   ? Hydronephrosis   ? Bladder spasms   ? Hypotension, unspecified hypotension type   ? Acute reaction to stress   ? Anxiety disorder due to medical condition   ? Family relationship problem   ? History of posttraumatic stress disorder (PTSD)   ? Generalized muscle weakness   ? Myofascial pain   ? Moderate major depression (H)       Past Surgical History:     Past Surgical History:   Procedure Laterality Date   ? APPENDECTOMY     ? BELPHAROPTOSIS REPAIR      bilateral   ? benign breast cyst excision     ? BILE DUCT STENT PLACEMENT     ? BREAST BIOPSY Left     benign   many yrs ago   ? CAROTID ENDARTERECTOMY Left 2009   ? CHOLECYSTECTOMY     ? COLECTOMY  1978    \"subtotal\"   ? ERCP W/ SPHICTEROTOMY  01/03/2017    Placement of ventral pancreatic duct stent   ? ESOPHAGOGASTRODUODENOSCOPY N/A 12/14/2018    Procedure: ENDOSCOPIC ULTRASOUND;  Surgeon: Babak Merida MD;  Location: [facility];  Service: Gastroenterology   ? EXPLORATORY LAPAROTOMY  7/2013    after cholecystectomy   ? EXPLORATORY LAPAROTOMY      after cholecystectomy had surgery for \"something that was nicked\", gravely ill and in ICU for 1 month   ? EYE SURGERY      Cataract   ? HYSTERECTOMY     ? IR INFERIOR VENACAVAGRAM  2/23/2019   ? IR IVC FILTER PLACEMENT  2/14/2019   ? IR REMOVAL IVC FILTER  10/16/2019   ? LUMBAR LAMINECTOMY Left 8/11/2016    Procedure: LEFT L4-5 HEMILAMINECTOMY / MEDIAL FACETECTOMY & FORAMINOTOMY, RIGHT L5-S1 HEMILAMINECTOMY WITH FACETECTOMY & FORAMINOTOMY;  Surgeon: Litzy Patel MD;  Location: [facility];  Service:    ? NECK SURGERY  2010    neck muscle repair   ? NEPHROURETERECTOMY Right 2/20/2019    Procedure: ROBOTIC RIGHT NEPHROURETERECTOMY;  Surgeon: Parker Casillas MD;  Location: [facility];  Service: Urology   ? PICC  2/25/2019        ? PICC " AND MIDLINE TEAM LINE INSERTION  2/9/2019        ? RI CYSTOSCOPY,INSERT URETERAL STENT Right 2/16/2019    Procedure: Cystoscopy with Retrograde and right stent exchange.;  Surgeon: Jayla De Paz MD;  Location: [facility] OR;  Service: Urology   ? RI CYSTOURETHROSCOPY W/IRRIG & EVAC CLOTS N/A 2/10/2019    Procedure: CYSTOSCOPY, BLADDER BIOPSY, RIGHT SIDED URETEROSCOPY WITH SELECTED CYTOLOGY, CLOT IRRIGATION;  Surgeon: Parker Casillas MD;  Location: [facility] OR;  Service: Urology   ? SALPINGOOPHORECTOMY Left 1969   ? TONSILLECTOMY  1942   ? TOTAL VAGINAL HYSTERECTOMY  1984       Family History:     Family History   Problem Relation Age of Onset   ? Heart disease Mother    ? Kidney disease Mother    ? Aortic aneurysm Mother    ? Heart disease Father    ? Stroke Father    ? Kidney disease Father         Social History:    reports that she quit smoking about 19 years ago. She has a 20.00 pack-year smoking history. She has never used smokeless tobacco. She reports that she does not drink alcohol or use drugs.    Review of Systems:   General: Night Sweats, Weight Loss  Eyes: WNL  Ears/Nose/Throat: Hearing Loss  Lungs: Snoring  Heart: Chest Pain  Stomach: Nausea, Diarrhea, Constipation     Muscle/Joints: Joint Pain, Muscle Pain  Skin: WNL  Nervous System: Daytime Sleepiness  Mental Health: Depression, Anxiety     Blood: WNL    Meds:     Current Outpatient Medications:   ?  acetaminophen (TYLENOL) 500 MG tablet, Take 650 mg by mouth 3 (three) times a day.    , Disp: , Rfl:   ?  calcium carb/vitamin D3/vit K1 (VIACTIV ORAL), Take 1 tablet by mouth daily., Disp: , Rfl:   ?  colestipol (COLESTID) 1 gram tablet, Take 2 tablets (2 g total) by mouth daily for 14 days, THEN 2 tablets (2 g total) 2 (two) times a day., Disp: 120 tablet, Rfl: 1  ?  cyanocobalamin, vitamin B-12, 5,000 mcg Subl, Take 1 tablet by mouth., Disp: , Rfl:   ?  diclofenac sodium (VOLTAREN) 1 % Gel, Apply 4 g topically 4 (four)  times a day. (apply to knees Q AM and Q 2pm and prn.  May self-administer), Disp: 100 g, Rfl: 5  ?  fentaNYL (DURAGESIC) 75 mcg/hr, Place 1 patch on the skin every other day., Disp: 15 patch, Rfl: 0  ?  ferrous sulfate 65 mg elemental iron, Take 1 tablet by mouth daily with breakfast., Disp: , Rfl:   ?  hydrOXYzine pamoate (VISTARIL) 25 MG capsule, Take one tab every  6-8 hours for symptoms of withdrawal, Disp: 30 capsule, Rfl: 0  ?  Lactobacillus acidoph-L.bulgar (FLORANEX) 1 million cell Tab tablet, Take 1 tablet by mouth daily., Disp: 30 tablet, Rfl: 0  ?  lamoTRIgine (LAMICTAL) 25 MG tablet, Take 1 tablet (25 mg total) by mouth daily., Disp: 90 tablet, Rfl: 2  ?  levothyroxine (LEVO-T) 50 MCG tablet, Take 1 tablet (50 mcg total) by mouth Daily at 6:00 am., Disp: 30 tablet, Rfl: 3  ?  MAGNESIUM ORAL, Take 400 mg by mouth daily.    , Disp: , Rfl:   ?  methocarbamol (ROBAXIN) 500 MG tablet, Take 1 tablet (500 mg total) by mouth 4 (four) times a day. (Patient taking differently: Take 500 mg by mouth 2 (two) times a day.    ), Disp: 90 tablet, Rfl: 0  ?  naloxone (NARCAN) 4 mg/actuation nasal spray, 1 spray (4 mg dose) into one nostril for opioid reversal. Call 911. May repeat if no response in 3 minutes., Disp: 1 Box, Rfl: 0  ?  promethazine (PHENERGAN) 12.5 MG tablet, Take 1 tablet (12.5 mg total) by mouth every 6 (six) hours as needed for nausea., Disp: 20 tablet, Rfl: 1  ?  rosuvastatin (CRESTOR) 40 MG tablet, Take 1.5 tablets (60 mg total) by mouth at bedtime., Disp: 135 tablet, Rfl: 3  ?  SUMAtriptan (IMITREX) 50 MG tablet, Take 1 tablet (50 mg total) by mouth every 2 (two) hours as needed for migraine., Disp: 27 tablet, Rfl: 1  ?  topiramate (TOPAMAX) 50 MG tablet, Take 1.5 tablets (75 mg total) by mouth 2 (two) times a day., Disp: 270 tablet, Rfl: 0  ?  traZODone (DESYREL) 100 MG tablet, TAKE 2 TO 4 TABLETS(200  MG) BY MOUTH AT BEDTIME AS NEEDED FOR SLEEP, Disp: 360 tablet, Rfl: 0  ?  warfarin  "(COUMADIN/JANTOVEN) 5 MG tablet, Take one to two tablets (5 to 10 mg) by mouth daily. Adjust dose based on INR results as directed., Disp: 180 tablet, Rfl: 1    Current Facility-Administered Medications:   ?  denosumab 60 mg (PROLIA 60 mg/ml), 60 mg, Subcutaneous, Q6 Months, Andrei Olivera MD, 60 mg at 10/11/19 1319    Allergies:   Acamprosate; Ambien [zolpidem]; Carbamazepine; Duloxetine; Lyrica [pregabalin]; Sulfa (sulfonamide antibiotics); Lamotrigine; Amiloride; Amoxicillin; Aspirin; Augmentin [amoxicillin-pot clavulanate]; Blood-group specific substance; Chromium and derivatives; Clinoril [sulindac]; Flexeril [cyclobenzaprine]; Labetalol; Metoprolol; Mirtazapine; Nsaids (non-steroidal anti-inflammatory drug); Other allergy (see comments); Other drug allergy (see comments); Oxycodone; Serotonin; Serzone [nefazodone]; Tolmetin; Tylenol [acetaminophen]; Verapamil; Cortisone; Depakote [divalproex]; and Sulfacetamide sodium      Objective:      Physical Exam  121 lb (54.9 kg)  5' 2\" (1.575 m)  Body mass index is 22.13 kg/m .  /66 (Patient Site: Left Arm, Patient Position: Sitting, Cuff Size: Adult Regular)   Pulse 72   Resp 14   Ht 5' 2\" (1.575 m)   Wt 121 lb (54.9 kg)   BMI 22.13 kg/m      General Appearance:   Awake, Alert, No acute distress.   HEENT:  Pupil equal and reactive to light. No scleral icterus; the mucous membranes were moist.   Neck: No cervical bruits. No JVD. No thyromegaly.     Chest: The spine was straight. The chest was symmetric.   Lungs:   Respirations unlabored; Lungs are clear to auscultation. No crackles. No wheezing.   Cardiovascular:   Regular rhythm and rate, normal first and second heart sounds with no murmurs. No rubs or gallops.    Abdomen:  Soft. No tenderness. Non-distended. Bowels sounds are present   Extremities: Equal tibial pulses. No leg edema.   Skin: No rashes or ulcers. Warm, Dry.   Musculoskeletal: No tenderness. No deformity.   Neurologic: Mood and affect " are appropriate. No focal deficits.         EKG:  Personally reivewed  Normal sinus rhythm   Normal ECG   When compared with ECG of 04-OCT-2017 19:41,   No significant change was found     Cardiac Imaging Studies  Holter on 6-:  CONCLUSION:  Normal Holter, symptoms associated with normal sinus rhythm.  No  significant pauses.    Nuclear stress test on [phone]:    The pharmacologic nuclear stress test is negative for inducible myocardial ischemia or infarction.    The left ventricular ejection fraction is greater than 70%.    There is no prior study available.    ECHO on 12-:    When compared to the previous study dated 2/22/2016, no significant change.    Left Ventricle: Normal size and systolic function.The estimated left ventricular ejection fraction is 65%. This represents a normal ejection fraction. The left ventricular wall thickness is normal. E/e' > 15, suggesting elevated LV filling pressures.    Left Atrium: Left atrial volume is mildly increased.    Tricuspid Valve: Mild tricuspid valve regurgitation. No pulmonary hypertension present. The estimated systolic pulmonary pressure is 28 mmhg.    Coronary CT angiogram on 8-2-2018:    The total Agatston calcium score is 160. A calcium score in this range places the individual in the 75th percentile when compared to an age and gender matched control group and implies a moderate risk of cardiac events in the next ten years.    There is moderate stenosis (50-69%) in the mid left anterior descending artery.    Lab Review   Lab Results   Component Value Date     10/31/2019    K 3.9 10/31/2019     (H) 10/31/2019    CO2 19 (L) 10/31/2019    BUN 16 10/31/2019    CREATININE 1.32 (H) 10/31/2019    CALCIUM 8.2 (L) 10/31/2019     Lab Results   Component Value Date    WBC 3.2 (L) 10/31/2019    HGB 11.5 (L) 10/31/2019    HCT 33.8 (L) 10/31/2019    MCV 99 10/31/2019     10/31/2019     Lab Results   Component Value Date    CHOL 220 (H)  10/31/2019    CHOL 183 06/10/2019    CHOL 127 01/29/2019     Lab Results   Component Value Date    HDL 78 10/31/2019    HDL 69 06/10/2019    HDL 45 (L) 01/29/2019     Lab Results   Component Value Date    LDLCALC 122 10/31/2019    LDLCALC 99 06/10/2019    LDLCALC 67 01/29/2019     Lab Results   Component Value Date    TRIG 100 10/31/2019    TRIG 76 06/10/2019    TRIG 73 01/29/2019     Lab Results   Component Value Date    TROPONINI <0.01 02/09/2019     Lab Results   Component Value Date     (H) 12/10/2016     Lab Results   Component Value Date    TSH 0.62 10/31/2019

## 2021-06-28 NOTE — PROGRESS NOTES
Progress Notes by Yogi Velazquez PT at 11/25/2019 11:00 AM     Author: Yogi Velazquez PT Service: -- Author Type: Physical Therapist    Filed: 3/2/2020  1:56 PM Encounter Date: 11/25/2019 Status: Attested Addendum    : Yogi Velazquez PT (Physical Therapist)    Related Notes: Original Note by Yogi Velazquez PT (Physical Therapist) filed at 11/25/2019 12:04 PM    Cosigner: Michael Ramirez DO at 3/2/2020  2:28 PM    Attestation signed by Michael Ramirez DO at 3/2/2020  2:28 PM    Follow the therapist's recommendation                [facility] Daily Progress   [facility] Re-Certification Request    March 2, 2020      Patient: Sandy Spencer  MR Number: [id number]  YOB: 1940  Date of Visit: 11/25/2019  Onset Date:  06/12/2019  Date of Eval: 06/12/2019    Dear Dr. Ramirez    As you may recall, we have been seeing Sandy Spencer for Physical Therapy of Trunk and Lower Extremities.    For therapy to continue, Medicare and/or Medicaid requires periodic physician review of the treatment plan. Please review the summary of the patient's progress and our plan for continued therapy, and verify  that you agree therapy should continue by entering certification dates at the bottom of this note and co-signing this note.    Plan of Care  No data recorded    Goal  No data recorded  No data recorded      If you have any questions or concerns, please don't hesitate to call.    Sincerely,      Yogi Velazquez PT        Physician recommendation:     ___ Follow therapist's recommendation        ___ Modify therapy      *Physician co-signature indicates they certify the need for these services furnished within this plan and while under their care.    Patient Name: Sandy Spencer  Date: 11/25/2019  Visit #: 8/12 +12       Referral Diagnosis: Abdominal Pain   HA;s Neck Back Pain  Referring provider: Michael Ramirez DO  Visit Diagnosis: R LE pain, R Knee Pain,  Abdominal  pain    ICD-10-CM    1. Complex regional pain syndrome type 1 of both lower extremities G90.523    2. Chronic pain syndrome G89.4    3. Myofascial pain M79.18    4. Complex regional pain syndrome type 1 of right upper extremity G90.511    5. 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 Reflex sympathetic dystrophy G90.50    7. Chronic pain of right knee M25.561     G89.29    8. Lumbar radiculopathy M54.16    9. Left lower quadrant abdominal pain R10.32          Assessment:     Results of Treatment   Abdominal pain reduced 6/10 to 2/10   Visceral motility to the  colon restored  MF tension to abdomen reduced   Lower extremity pain can be reduced with periosteal releases of the pelvis, femurs and tibias.   Pt has a difficult time maintaining these improvements due to her multiple health problems      Patient is benefitting from skilled physical therapy and is making steady progress toward functional goals.  Patient is appropriate to continue with skilled physical therapy intervention, as indicated by initial plan of care.      Treatment Results    Goal Status:  No data recorded  No data recorded    Plan / Patient Education:     Plan to con't with manual therapy to decrease fascial tension/tone to normalize ROM/decrease inflammation/decrease mm tone and improve proprioception.      Subjective:     Pain levels    Abdominal pain  6/10  No HA  Today,   LBP 6/10    Objective:   + Scan    Post Neuro L Abdomen.               Treatment Today   11/25/2019   TREATMENT MINUTES COMMENTS   Evaluation     Self-care/ Home management          Manual therapy 60 MFR with OA, L5-SI and SI joint releases, Dural Tube   SCS to IMES-N,   SCS to PS2-N L,  SVET11,12-LV L,   NAOMI-A,   NAOMI-LV,   SVMET-MS L,  Sigmoid colon Med,Lat.Inf.      Neuromuscular Re-education     Therapeutic Activity     herapeutic Exercises     Gait training     Modality__________________                Total 60    Blank areas are intentional and mean the  treatment did not include these items.       Yogi Velazquez  11/25/2019

## 2021-06-29 NOTE — PROGRESS NOTES
"Progress Notes by Perla Maya LPN at 4/21/2020  1:40 PM     Author: Perla Maya LPN Service: -- Author Type: Licensed Nurse    Filed: 4/22/2020  9:03 AM Date of Service: 4/21/2020  1:40 PM Status: Signed    : Perla Maya LPN (Licensed Nurse)       Sandy Spencer is a 79 y.o. female who is being evaluated via a billable telephone visit.      The patient has been notified of following:     \"This telephone visit will be conducted via a call between you and your physician/provider. We have found that certain health care needs can be provided without the need for a physical exam.  This service lets us provide the care you need with a short phone conversation.  If a prescription is necessary we can send it directly to your pharmacy.  If lab work is needed we can place an order for that and you can then stop by our lab to have the test done at a later time.    Telephone visits are billed at different rates depending on your insurance coverage. During this emergency period, for some insurers they may be billed the same as an in-person visit.  Please reach out to your insurance provider with any questions.    If during the course of the call the physician/provider feels a telephone visit is not appropriate, you will not be charged for this service.\"    Patient has given verbal consent to a Telephone visit? Yes    Patient would like to receive their AVS by AVS Preference: Danish.     Patient is here for a follow up appointment with Dr. Lynn. Patient has bilateral legs, right arm, headache and kidney pain, describes the pain as intermittent, sharp, piercing and achy,rates the pain as 8/10. Patient needs refills for Fentanyl. Functionality is 7. Patient states her pain interferes with her sleep, walk, work, and ADL's.               Perla aMya LPN           "

## 2021-06-29 NOTE — PROGRESS NOTES
Progress Notes by Luz Elena Ybarra MD at 11/11/2020  3:10 PM     Author: Luz Elena Ybarra MD Service: -- Author Type: Physician    Filed: 11/11/2020  3:34 PM Encounter Date: 11/11/2020 Status: Signed    : Luz Elena Ybarra MD (Physician)           Click to link to [facility] NOTE       Assessment/Plan:   1.  Coronary artery disease: The patient's coronary CT angiogram was reported moderate to stenosis in LAD.  The patient's needle sticking chest pain most likely is related to gastric or reflux disease, not cardiac etiology. No change in last year. Continue to monitor it.       2.  Dyslipidemia: Her LDL has been well controlled.  Currently she is Crestor 40 mg at bedtime and Rapatha 140 mg SubQ every two weeks.  Recent lipid profile was reported total cholesterol 143, triglycerides 47, HDL 89, LDL 45.  Liver function is normal.    3.  Carotid artery stenosis status post left CEA: The patient complains of lightheadedness, but no dizziness or syncope.  Ultrasound carotid artery was reported no significant stenosis in both sides.    4.  Pulmonary embolism on warfarin, stage III CKD and other chronic medical issues: Continue to follow-up with Dr. Rees.    Thank you for the opportunity to be involved in the care of Sandy Spencer. If you have any questions, please feel free to contact me.  I will see the patient again in 12 months and as needed.    Much or all of the text in this note was generated through the use of Dragon Dictate voice-to-text software. Errors in spelling or words which seem out of context are unintentional.   Sound alike errors, in particular, may have escaped editing.       History of Present Illness:   It is my pleasure to see Sandy Spencer at the [facility] for routine cardiology follow-up. Sandy Spencer is a 80 y.o. female with a medical history of coronary artery disease, essential hypertension, hyperlipidemia, anxiety and depression,  pancreatitis, carotid artery stenosis s/p left CEA, pulmonary embolism on warfarin, status post right nephrectomy and chronic kidney disease stage III.    The patient states that she had occasional sharp chest pain, located left low chest, lasted fseveral seconds, not associated with exertion or shortness of breath.  Her chest pain is similar to before.  No radiation.  Occasional occurred.  She did not have complaints of shortness of breath, palpitations.  She had no orthopnea, PND or leg edema.  She complains of occasional lightheadedness,, but no dizziness or syncope.  Her blood pressure and heart rate are controlled.  She has no side effects from her current medications.       Past Medical History:     Patient Active Problem List   Diagnosis   ? Chronic kidney disease (CKD) stage G3a/A1, moderately decreased glomerular filtration rate (GFR) between 45-59 mL/min/1.73 square meter and albuminuria creatinine ratio less than 30 mg/g   ? Focal glomerular sclerosis   ? RSD lower limb, bilateral   ? ADD (attention deficit disorder)   ? RSD upper limb, right   ? Osteopenia   ? Major depression   ? Hypertension   ? Insomnia   ? Mixed hyperlipidemia   ? Right rotator cuff tear   ? Cluster headaches   ? Lumbar radiculopathy   ? 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   ? Temporomandibular joint-pain-dysfunction syndrome (TMJ)   ? Localized swelling of lower leg   ? Acquired hypothyroidism   ? Chronic pain syndrome   ? Snoring   ? Abdominal pain, generalized   ? Dermatochalasis of left eyelid   ? Bilateral carotid artery stenosis   ? Coronary artery disease involving native coronary artery of native heart without angina pectoris   ? Sinus bradycardia   ? Other chronic pulmonary embolism without acute cor pulmonale (H)   ? Splenic infarction   ? Opioid type dependence, continuous (H)   ? Hematuria   ? Hydronephrosis   ? Bladder spasms   ? Anxiety disorder due to medical condition   ? Family relationship problem   ?  "History of posttraumatic stress disorder (PTSD)   ? Generalized muscle weakness   ? Myofascial pain   ? Moderate major depression (H)   ? Elevated lipase   ? Abdominal bloating   ? Acquired absence of other specified parts of digestive tract   ? Ampullary stenosis   ? Obstruction of biliary tree   ? Anemia   ? Aphthous ulcer of ileum   ? Bipolar disorder, unspecified (H)   ? Disorder of phosphorus metabolism   ? Edema   ? Gastroesophageal reflux disease without esophagitis   ? Obstruction of common bile duct   ? Overflow diarrhea   ? History of partial colectomy   ? Complex regional pain syndrome   ? Solitary left kidney   ? Flank pain   ? Hypocitraturia   ? Low urine output       Past Surgical History:     Past Surgical History:   Procedure Laterality Date   ? APPENDECTOMY     ? BELPHAROPTOSIS REPAIR      bilateral   ? benign breast cyst excision     ? BILE DUCT STENT PLACEMENT     ? BREAST BIOPSY Left     benign   many yrs ago   ? CAROTID ENDARTERECTOMY Left 2009   ? CHOLECYSTECTOMY     ? COLECTOMY  1978    \"subtotal\"   ? ERCP W/ SPHICTEROTOMY  01/03/2017    Placement of ventral pancreatic duct stent   ? ESOPHAGOGASTRODUODENOSCOPY N/A 12/14/2018    Procedure: ENDOSCOPIC ULTRASOUND;  Surgeon: Babak Merida MD;  Location: [facility];  Service: Gastroenterology   ? EXPLORATORY LAPAROTOMY  7/2013    after cholecystectomy   ? EXPLORATORY LAPAROTOMY      after cholecystectomy had surgery for \"something that was nicked\", gravely ill and in ICU for 1 month   ? EYE SURGERY      Cataract   ? HYSTERECTOMY     ? IR INFERIOR VENACAVAGRAM  2/23/2019   ? IR IVC FILTER PLACEMENT  2/14/2019   ? IR REMOVAL IVC FILTER  10/16/2019   ? LUMBAR LAMINECTOMY Left 8/11/2016    Procedure: LEFT L4-5 HEMILAMINECTOMY / MEDIAL FACETECTOMY & FORAMINOTOMY, RIGHT L5-S1 HEMILAMINECTOMY WITH FACETECTOMY & FORAMINOTOMY;  Surgeon: Litzy Patel MD;  Location: [facility];  Service:    ? NECK SURGERY  2010    neck muscle " repair   ? NEPHROURETERECTOMY Right 2/20/2019    Procedure: ROBOTIC RIGHT NEPHROURETERECTOMY;  Surgeon: Parker Casillas MD;  Location: [facility];  Service: Urology   ? PICC  2/25/2019        ? PICC AND MIDLINE TEAM LINE INSERTION  2/9/2019        ? OH CYSTOSCOPY,INSERT URETERAL STENT Right 2/16/2019    Procedure: Cystoscopy with Retrograde and right stent exchange.;  Surgeon: Jayla De Paz MD;  Location: [facility] OR;  Service: Urology   ? OH CYSTOURETHROSCOPY W/IRRIG & EVAC CLOTS N/A 2/10/2019    Procedure: CYSTOSCOPY, BLADDER BIOPSY, RIGHT SIDED URETEROSCOPY WITH SELECTED CYTOLOGY, CLOT IRRIGATION;  Surgeon: Parker Casillas MD;  Location: [facility];  Service: Urology   ? SALPINGOOPHORECTOMY Left 1969   ? TONSILLECTOMY  1942   ? TOTAL VAGINAL HYSTERECTOMY  1984       Family History:     Family History   Problem Relation Age of Onset   ? Heart disease Mother    ? Kidney disease Mother    ? Aortic aneurysm Mother    ? Heart disease Father    ? Stroke Father    ? Kidney disease Father         Social History:    reports that she quit smoking about 20 years ago. She has a 20.00 pack-year smoking history. She has never used smokeless tobacco. She reports that she does not drink alcohol or use drugs.    Review of Systems:   General: Weight Gain  Eyes: WNL  Ears/Nose/Throat: Hearing Loss  Lungs: WNL  Heart: Chest Pain, Leg Swelling  Stomach: Constipation  Bladder: Frequent Urination at Night  Muscle/Joints: Joint Pain, Muscle Pain  Skin: WNL  Nervous System: Daytime Sleepiness  Mental Health: Anxiety     Blood: WNL    Meds:     Current Outpatient Medications:   ?  calcium carb/vitamin D3/vit K1 (VIACTIV ORAL), Take 1 tablet by mouth daily., Disp: , Rfl:   ?  evolocumab 140 mg/mL PnIj, Inject 140 mg under the skin every 14 (fourteen) days., Disp: 6 mL, Rfl: 3  ?  fentaNYL (DURAGESIC) 25 mcg/hr, Place 1 patch on the skin every other day. (Patient taking differently: Place 1  patch on the skin every other day. 75 mg), Disp: 15 patch, Rfl: 0  ?  fentaNYL (DURAGESIC) 50 mcg/hr, Place 1 patch on the skin every other day., Disp: 15 patch, Rfl: 0  ?  levothyroxine (LEVO-T) 50 MCG tablet, Take 1 tablet (50 mcg total) by mouth Daily at 6:00 am., Disp: 90 tablet, Rfl: 3  ?  naloxone (NARCAN) 4 mg/actuation nasal spray, 1 spray (4 mg dose) into one nostril for opioid reversal. Call 911. May repeat if no response in 3 minutes., Disp: 1 Box, Rfl: 0  ?  rosuvastatin (CRESTOR) 40 MG tablet, Take 1 tablet (40 mg total) by mouth at bedtime., Disp: 90 tablet, Rfl: 3  ?  sodium phosphates 133 mL (FLEET ENEMA) 19-7 gram/118 mL Enem rectal enema, as needed. , Disp: , Rfl:   ?  SUMAtriptan (IMITREX) 50 MG tablet, Take 1 tablet (50 mg total) by mouth every 2 (two) hours as needed for migraine., Disp: 9 tablet, Rfl: 5  ?  traZODone (DESYREL) 100 MG tablet, Take 4 tablets (400 mg total) by mouth at bedtime. TAKE  4 TABLETS(400 MG) BY MOUTH AT BEDTIME AS NEEDED FOR SLEEP, Disp: 360 tablet, Rfl: 1  ?  valACYclovir (VALTREX) 1000 MG tablet, TAKE ONE TABLET BY MOUTH THREE TIMES A DAY FOR 7 DAYS as needed for outbreaks, Disp: 42 tablet, Rfl: 2  ?  warfarin ANTICOAGULANT (COUMADIN) 2.5 MG tablet, Take 1 tablet (2.5 mg total) by mouth See Admin Instructions. Take 2.5 to 7.5 mg daily, adjusted for INR, Disp: 90 tablet, Rfl: 3  ?  warfarin ANTICOAGULANT (COUMADIN/JANTOVEN) 5 MG tablet, Take one to two tablets (5 to 10 mg) by mouth daily. Adjust dose based on INR results as directed., Disp: 180 tablet, Rfl: 3  ?  zonisamide (ZONEGRAN) 25 MG capsule, One daily for one week, then one twice a day (Patient taking differently: daily. One daily for one week, then one twice a day), Disp: 60 capsule, Rfl: 2  ?  enoxaparin ANTICOAGULANT (LOVENOX) 60 mg/0.6 mL syringe, INJECT 0.6ML UNDER SKIN EVERY 12 HOURS, Disp: , Rfl:   ?  lamoTRIgine (LAMICTAL) 100 MG tablet, Two morning, one and one-half bedtime for one week, then two twice  "a day, Disp: 120 tablet, Rfl: 2    Current Facility-Administered Medications:   ?  teriparatide injection 20 mcg (FORTEO), 20 mcg, Subcutaneous, DAILY, Caroline Sumner NP    Allergies:   Acamprosate, Ambien [zolpidem], Carbamazepine, Duloxetine, Lyrica [pregabalin], Sulfa (sulfonamide antibiotics), Lamotrigine, Amiloride, Amoxicillin, Aspirin, Augmentin [amoxicillin-pot clavulanate], Blood-group specific substance, Chromium and derivatives, Clinoril [sulindac], Flexeril [cyclobenzaprine], Iron, Labetalol, Metoprolol, Mirtazapine, Nsaids (non-steroidal anti-inflammatory drug), Other allergy (see comments), Other drug allergy (see comments), Oxycodone, Serotonin, Serzone [nefazodone], Tolmetin, Tylenol [acetaminophen], Verapamil, Zolpidem tartrate, Cortisone, Depakote [divalproex], and Sulfacetamide sodium      Objective:      Physical Exam  142 lb (64.4 kg)  5' 2\" (1.575 m)  Body mass index is 25.97 kg/m .  /70 (Patient Site: Left Arm, Patient Position: Sitting, Cuff Size: Adult Regular)   Pulse 76   Resp 14   Ht 5' 2\" (1.575 m)   Wt 142 lb (64.4 kg)   BMI 25.97 kg/m      General Appearance:   Awake, Alert, No acute distress.   HEENT:  Pupil equal and reactive to light. No scleral icterus; the mucous membranes were moist.   Neck: No cervical bruits. No JVD. No thyromegaly.     Chest: The spine was straight. The chest was symmetric.   Lungs:   Respirations unlabored; Lungs are clear to auscultation. No crackles. No wheezing.   Cardiovascular:   Regular rhythm and rate, normal first and second heart sounds with no murmurs. No rubs or gallops.    Abdomen:  Soft. No tenderness. Non-distended. Bowels sounds are present   Extremities: Equal tibial pulses. No leg edema.   Skin: No rashes or ulcers. Warm, Dry.   Musculoskeletal: No tenderness. No deformity.   Neurologic: Mood and affect are appropriate. No focal deficits.         EKG:  Personally reivewed  Normal sinus rhythm   Normal ECG   When compared with " ECG of 04-OCT-2017 19:41,   No significant change was found     Cardiac Imaging Studies  Holter on 6-:  CONCLUSION:  Normal Holter, symptoms associated with normal sinus rhythm.  No  significant pauses.    Nuclear stress test on [phone]:    The pharmacologic nuclear stress test is negative for inducible myocardial ischemia or infarction.    The left ventricular ejection fraction is greater than 70%.    There is no prior study available.    ECHO on 12-:    When compared to the previous study dated 2/22/2016, no significant change.    Left Ventricle: Normal size and systolic function.The estimated left ventricular ejection fraction is 65%. This represents a normal ejection fraction. The left ventricular wall thickness is normal. E/e' > 15, suggesting elevated LV filling pressures.    Left Atrium: Left atrial volume is mildly increased.    Tricuspid Valve: Mild tricuspid valve regurgitation. No pulmonary hypertension present. The estimated systolic pulmonary pressure is 28 mmhg.    Coronary CT angiogram on 8-2-2018:    The total Agatston calcium score is 160. A calcium score in this range places the individual in the 75th percentile when compared to an age and gender matched control group and implies a moderate risk of cardiac events in the next ten years.    There is moderate stenosis (50-69%) in the mid left anterior descending artery.    Lab Review   Lab Results   Component Value Date     10/07/2020    K 4.1 10/07/2020     10/07/2020    CO2 28 10/07/2020    BUN 28 10/07/2020    CREATININE 1.57 (H) 10/07/2020    CALCIUM 8.3 (L) 10/07/2020     Lab Results   Component Value Date    WBC 4.2 09/16/2020    HGB 11.0 (L) 09/16/2020    HCT 33.2 (L) 09/16/2020    MCV 97 09/16/2020     09/16/2020     Lab Results   Component Value Date    CHOL 143 10/07/2020    CHOL 163 06/08/2020    CHOL 270 (H) 02/28/2020     Lab Results   Component Value Date    HDL 89 10/07/2020    HDL 93 06/08/2020    HDL  88 02/28/2020     Lab Results   Component Value Date    LDLCALC 45 10/07/2020    LDLCALC 59 06/08/2020    LDLCALC 162 (H) 02/28/2020     Lab Results   Component Value Date    TRIG 47 10/07/2020    TRIG 57 06/08/2020    TRIG 99 02/28/2020     Lab Results   Component Value Date    TROPONINI <0.01 02/09/2019     Lab Results   Component Value Date     (H) 12/10/2016     Lab Results   Component Value Date    TSH 0.61 06/25/2020

## 2021-06-29 NOTE — PROGRESS NOTES
"Progress Notes by Perla Maya LPN at 6/15/2020  2:40 PM     Author: Perla Maya LPN Service: -- Author Type: Licensed Nurse    Filed: 6/16/2020  8:55 AM Date of Service: 6/15/2020  2:40 PM Status: Signed    : Perla Maya LPN (Licensed Nurse)       Sandy Spencer is a 79 y.o. female who is being evaluated via a billable telephone visit.      The patient has been notified of following:     \"This telephone visit will be conducted via a call between you and your physician/provider. We have found that certain health care needs can be provided without the need for a physical exam.  This service lets us provide the care you need with a short phone conversation.  If a prescription is necessary we can send it directly to your pharmacy.  If lab work is needed we can place an order for that and you can then stop by our lab to have the test done at a later time.    Telephone visits are billed at different rates depending on your insurance coverage. During this emergency period, for some insurers they may be billed the same as an in-person visit.  Please reach out to your insurance provider with any questions.    If during the course of the call the physician/provider feels a telephone visit is not appropriate, you will not be charged for this service.\"    Patient has given verbal consent to a Telephone visit? Yes    What phone number would you like to be contacted at? [phone]    Patient would like to receive their AVS by AVS Preference: Mail a copy.    Patient is here for a follow up appointment with Dr. Lynn. Patient has back, headache and bilateral knee pain, describes the pain as constant dull ache,rates the pain as 5/10. Patient needs refills for Fentanyl. CSA, TIMA, NDI and UDT are deferred due to COVID 19. Functionality is 6. Patient states their pain interferes with sleep, walking and social/relationships.    Perla Maya LPN           "

## 2021-06-30 NOTE — PROGRESS NOTES
Progress Notes by Amarilys Jolly OT at 5/6/2021 12:30 PM     Author: Amarilys Jolly OT Service: -- Author Type: Occupational Therapist    Filed: 5/7/2021  9:59 AM Encounter Date: 5/6/2021 Status: Attested    : Amarilys Jolly OT (Occupational Therapist) Cosigner: Caitlyn Rees MD at 5/7/2021 11:46 AM    Attestation signed by Caitlyn Rees MD at 5/7/2021 11:46 AM    I agree with the OT assessment and plan                    Certification Request    May 6, 2021      Patient: Sandy Spencer  MR Number: [id number]  YOB: 1940  Date of Visit: 5/6/2021      Dear Dr. Caitlyn Rees:    Thank you for this referral.   We are seeing Sandy Spencer in Occupational Therapy for right wrist and hand fracture.    Medicare and/or Medicaid requires physician review and approval of the treatment plan. Please review the plan of care and verify that you agree with the therapy plan of care by co-signing this note.      Plan of Care  Authorization / Certification Start Date: 05/06/21  Authorization / Certification End Date: 08/04/21  Authorization / Certification Number of Visits: 12  Communication with: Referral Source  Patient Related Instruction: Nature of Condition;Treatment plan and rationale;Basis of treatment;Expected outcome;Precautions  Times per Week: 1  Number of Weeks: 12  Number of Visits: 12  Select Plan of Care: Select  Therapeutic Exercise: Strengthening;Stretching;ROM  Neuromuscular Reeducation: kinesio tape  Manual Therapy: soft tissue mobilization  Modalities: paraffin  Functional Training (ADL's): compensatory training;ADL's;ergonomics      Goals:  Patient Will Demonstrate / Verbalize independence in self-management of condition in: 2 weeks  Patient will be independent with home exercise program in: 2 weeks  Patient will be able to: lift;carry;reach;for grocery shopping;with less pain;in 12 weeks  Patient will perform: housework;with less  pain;in 12 weeks  Patient will be able to  & pinch: for meal prep;with less pain;in 12 weeks  Patient will improve hand/finger coordination for: writing;typing;with less pain;in 12 weeks        If you have any questions or concerns, please don't hesitate to call.    Sincerely,      Amarilys Jolly, OT        Physician recommendation:                                ___ Follow therapist's recommendation                                                                                                    ___ Modify therapy                                                                                                      Physician Signature:_____________________                                                                                                                                        Date:___________________________      *Physician co-signature indicates they certify the need for these services furnished within this plan and while under their care.        Upper Extremity Initial Evaluation    Patient Name: Sandy Spencer  Date of evaluation: 5/6/2021  Referral Diagnosis: closed fracture of right hand  Referring provider: Caitlyn Rees*  Visit Diagnosis:     ICD-10-CM    1. Pain in finger of right hand  M79.644    2. Right hand weakness  R29.898    3. Decreased activities of daily living (ADL)  Z78.9        Assessment:      Pt. is appropriate for skilled OT intervention as outlined in the Plan of Care (POC).    Goals:  Patient Will Demonstrate / Verbalize independence in self-management of condition in: 2 weeks  Patient will be independent with home exercise program in: 2 weeks  Patient will be able to: lift;carry;reach;for grocery shopping;with less pain;in 12 weeks  Patient will perform: housework;with less pain;in 12 weeks  Patient will be able to  & pinch: for meal prep;with less pain;in 12 weeks  Patient will improve hand/finger coordination for: writing;typing;with less pain;in 12  weeks      Patient's expectations/goals are realistic.    Barriers to Learning or Achieving Goals:  No Barriers.       Plan / Patient Instructions:        Plan of Care:   Authorization / Certification Start Date: 05/06/21  Authorization / Certification End Date: 08/04/21  Authorization / Certification Number of Visits: 12  Communication with: Referral Source  Patient Related Instruction: Nature of Condition;Treatment plan and rationale;Basis of treatment;Expected outcome;Precautions  Times per Week: 1  Number of Weeks: 12  Number of Visits: 12  Select Plan of Care: Select  Therapeutic Exercise: Strengthening;Stretching;ROM  Neuromuscular Reeducation: kinesio tape  Manual Therapy: soft tissue mobilization  Modalities: paraffin  Functional Training (ADL's): compensatory training;ADL's;ergonomics      POC and pathology of condition were reviewed with patient.  Pt. is in agreement with the Plan of Care. Treatment techniques, plan of care, and goals were discussed with the patient.  The patient agrees to the plan as outlined.  The plan of care is dynamic and will be modified on an ongoing basis.    A Home Exercise Program (HEP) was initiated today. Pt. was instructed in exercises by OT and patient was given a handout with detailed instructions.        Subjective:        Social information:   Occupation:retired   Work Status:NA     History of Present Illness:    Sandy is a 80 y.o. female who presents to therapy today with complaints of right hand pain and weakness following a fall on outstretched hand resulting in a closed intra-articular fracture of of distal end of right radius as well as right 5th metacarpal. Date of onset/duration of symptoms is March 12th, 2021. Onset was sudden. Symptoms are not improving. She reports a history of right full thickness and partial thickness tear of right rotator cuff. She also has RSD in the right foot and right UE.  She describes their previous level of function as limited because  of the shoulder pain and decreased function. Functional limitations are described as occurring with gripping, pinching, lifting, carrying for ADL's and IADL's.     Pain rating at rest: 0  Pain rating with activity: 10         Objective:      Note: Items left blank indicates the item was not performed or not indicated at the time of the evaluation.    Patient Outcome Measures :    QuickDASH Score: 47.7      Upper Extremity Examination:  1. Pain in finger of right hand     2. Right hand weakness     3. Decreased activities of daily living (ADL)       Precautions/Restrictions: she was told to let pain be her guide  Involved side: Right  Atrophy:  Absent  Color: Normal  Temperature: Normal  Edema: Minimal  Palpation: multiple areas on right hand  Scar: NA  Guarded extremity: Moderate.  Sensory: Patient reports normal.      Hand AROM  Right   Left     NT WNL Impaired NT WNL Impaired   Full Fist  X   X    Flat Fist  X   X    Claw Fist   X  X      ROM/STRENGTH RIGHT LEFT   Note: * indicates pain AROM AROM   Shoulder Flexion - 180? 70 WNL   Shoulder Ext - 60?      Shoulder Abd - 180?     Elbow Flexion - 150? WNL WNL   Elbow Extension - 0?    WNL WNL   Forearm Supination - 80? WNL WNL   Forearm Pronation - 80? WNL WNL   Wrist Flexion - 65-80?  WNL WNL   Wrist Extension - 65-80? WNL WNL   Ulnar Deviation - 20-35?     Radial Deviation - 10-20?      Strength 22# 55#   3 Point Pinch 11# 12#   Lateral Pinch 14# 15#     May benefit from PT evaluation: already seeing PT    U/E orthosis currently:   No    Plan for next visit: brachial plexus nerve glides, paraffin to right hand, gentle STM    Treatment Today: Patient instructed on gentle stretch to right fingers in claw position today. Practiced technique to get proper form and avoid substitutions as well as emphasized that it's a gentle and prolonged stretch and not an aggressive stretch.   TREATMENT MINUTES COMMENTS   Evaluation 30    Self-care/ Home management     Manual  therapy     Neuromuscular Re-education     Orthotic fitting     Therapeutic Exercises 30    Iontophoresis           Total 60    Blank areas are intentional and mean the treatment did not include these items.     GOALS AND PLAN OF CARE WERE ESTABLISHED IN COOPERATION WITH THE PATIENT    OT Evaluation Code: (Please list factors)   Comorbidities:   Patient Active Problem List   Diagnosis   ? Chronic kidney disease (CKD) stage G3a/A1, moderately decreased glomerular filtration rate (GFR) between 45-59 mL/min/1.73 square meter and albuminuria creatinine ratio less than 30 mg/g   ? Focal glomerular sclerosis   ? RSD lower limb, bilateral   ? ADD (attention deficit disorder)   ? RSD upper limb, right   ? Osteopenia   ? Major depression   ? Hypertension   ? Insomnia   ? Mixed hyperlipidemia   ? Right rotator cuff tear   ? Cluster headaches   ? Lumbar radiculopathy   ? 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   ? Temporomandibular joint-pain-dysfunction syndrome (TMJ)   ? Acquired hypothyroidism   ? Chronic pain syndrome   ? Snoring   ? Abdominal pain, generalized   ? Dermatochalasis of left eyelid   ? Bilateral carotid artery stenosis   ? Coronary artery disease involving native coronary artery of native heart without angina pectoris   ? Other chronic pulmonary embolism without acute cor pulmonale (H)   ? Opioid type dependence, continuous (H)   ? Hematuria   ? Hydronephrosis   ? Bladder spasms   ? Anxiety disorder due to medical condition   ? Family relationship problem   ? History of posttraumatic stress disorder (PTSD)   ? Generalized muscle weakness   ? Myofascial pain   ? Moderate major depression (H)   ? Elevated lipase   ? Abdominal bloating   ? Acquired absence of other specified parts of digestive tract   ? Ampullary stenosis   ? Obstruction of biliary tree   ? Anemia   ? Aphthous ulcer of ileum   ? Bipolar disorder, unspecified (H)   ? Disorder of phosphorus metabolism   ? Edema   ? Gastroesophageal reflux  disease without esophagitis   ? Obstruction of common bile duct   ? Overflow diarrhea   ? History of partial colectomy   ? Complex regional pain syndrome   ? Solitary left kidney   ? Flank pain   ? Hypocitraturia   ? Primary osteoarthritis of both knees   ? Iron deficiency anemia   ? Proteinuria       Profile/History Review: Brief    Need for eval modification: No    # Treatment options: Limited    Clinical Decision Making:  Low      Occupational Profile/ Medical and Therapy History and Comorbidities Occupational Performance Clinical Decision Making   (Complexity)   brief history with review of medical/therapy records related to the presenting problem.  No comorbidities 1-3 Performance deficits that result in activity limitations and/or participation restrictions.    No Assessment Modification  Low complexity, which includes  problem-focused assessments, and consideration of a limited number of treatment options.      expanded review of medical/therapy records and additional review of physical, cognitive and psychosocial history.    May have comorbidities 3-5 Performance deficits that result in activity limitations and/or participation restrictions.    Minimal to moderate modification of assessment Moderate complexity, which includes analysis of data from detailed assessments, and consideration of several treatment options.         Review of medical/therapy records and extensive additional review of physical, cognitive and psychosocial history.  Comorbidities affect occupational performance 5 or more Performance deficits that result in activity limitations and/or participation restrictions.    Significant modification of assessment High complexity, analysis of  Occupational profile and data,  Comprehensive assessments, multiple treatment options.            Amarilys Jolly  5/6/2021  12:32 PM

## 2021-07-02 NOTE — H&P
H&P by Zamzam Barraza PA-C at 10/16/2019  8:00 AM     Author: Zamzam Barraza PA-C Service: Interventional Radiology Author Type: Physician Assistant    Filed: 10/16/2019  7:59 AM Date of Service: 10/16/2019  8:00 AM Status: Signed    : Zamzam Barraza PA-C (Physician Assistant)         Interventional Radiology - Pre-Procedure Note:  10/16/2019    Procedure Requested: IVC filter removal  Requested by: Sameer Floyd MD    History and Physical Reviewed: H&P documented within 30 days (by Sloan Hussein CNP on /11/19).  I have personally reviewed the patient's medical history and have updated the medical record as necessary.    Brief HPI: Sandy Spencer is a 79 y.o. old female who had retrievable IVC filter placed 2/14/19 prior to right nephrectomy for high grade urothelial carcinoma as anticoagulation for PE needed to be temporary stopped for surgery. Patient has been tolerating coumadin and is here today for filter removal     NPO: midnight  ANTICOAGULANTS: coumadin - took yesterday.  ANTIBIOTICS: none    ALLERGIES  Acamprosate; Ambien [zolpidem]; Carbamazepine; Duloxetine; Lyrica [pregabalin]; Sulfa (sulfonamide antibiotics); Lamotrigine; Amiloride; Amoxicillin; Aspirin; Augmentin [amoxicillin-pot clavulanate]; Blood-group specific substance; Chromium and derivatives; Clinoril [sulindac]; Flexeril [cyclobenzaprine]; Labetalol; Metoprolol; Mirtazapine; Nsaids (non-steroidal anti-inflammatory drug); Other allergy (see comments); Other drug allergy (see comments); Oxycodone; Serotonin; Serzone [nefazodone]; Tolmetin; Tylenol [acetaminophen]; Verapamil; Cortisone; Depakote [divalproex]; and Sulfacetamide sodium    LABS:  INR (no units)   Date Value   10/16/2019 1.87 (H)     Hemoglobin (g/dL)   Date Value   10/07/2019 11.7 (L)     Platelets (thou/uL)   Date Value   10/07/2019 168     Creatinine (mg/dL)   Date Value   10/11/2019 1.43 (H)     Potassium (mmol/L)   Date Value    10/11/2019 3.8       EXAM:  /58 (Patient Position: Sitting)   Pulse 78   Temp 98.3  F (36.8  C) (Oral)   Resp 16   Wt 121 lb (54.9 kg)   SpO2 97%   BMI 22.13 kg/m    General: Stable. In no acute distress.  Neuro: A&O x 3. Moves all extremities equally.  Resp: Lungs clear to auscultation bilaterally.  Cardio: S1S2 and reg, without murmur, clicks or rubs      Pre-Sedation Assessment:  Pt has requested anesthesia for procedure.      ASSESSMENT/PLAN:   retrievable IVC filter placed 2/14/19 prior to right nephrectomy for high grade urothelial carcinoma as anticoagulation for PE needed to be temporary stopped for surgery.    IVC filter removal.    Procedure, risk/benefits reviewed with pt/family. All questions answered. Consent obtained. OK to proceed with above radiology procedure.     Zamzam Barraza  Interventional Radiology

## 2021-07-03 NOTE — ADDENDUM NOTE
Addendum Note by Eula Oliver RN at 5/14/2018  3:17 PM     Author: Eula Oliver RN Service: -- Author Type: Registered Nurse    Filed: 5/14/2018  3:17 PM Encounter Date: 5/14/2018 Status: Signed    : Eula Oliver RN (Registered Nurse)    Addended by: EULA OLIVER on: 5/14/2018 03:17 PM        Modules accepted: Orders

## 2021-07-03 NOTE — ADDENDUM NOTE
Addendum Note by Eula Oliver RN at 12/31/2020 10:39 AM     Author: Eula Oliver RN Service: -- Author Type: Registered Nurse    Filed: 12/31/2020 10:39 AM Encounter Date: 12/29/2020 Status: Signed    : Eula Oliver RN (Registered Nurse)    Addended by: EULA OLIVER on: 12/31/2020 10:39 AM        Modules accepted: Orders

## 2021-07-03 NOTE — ADDENDUM NOTE
Addendum Note by Arelis Fitzpatrick MD at 4/29/2019  3:32 PM     Author: Arelis Fitzpatrick MD Service: -- Author Type: Physician    Filed: 4/29/2019  3:32 PM Encounter Date: 4/29/2019 Status: Signed    : Arelis Fitzpatrick MD (Physician)    Addended by: ARELIS FITZPATRICK on: 4/29/2019 03:32 PM        Modules accepted: Orders

## 2021-07-03 NOTE — ADDENDUM NOTE
Addendum Note by Ricarda Burns LICSW at 8/13/2018  1:57 PM     Author: Ricarda Burns LICSW Service: -- Author Type: Licensed Social Worker    Filed: 8/16/2018  9:59 AM Date of Service: 8/13/2018  1:57 PM Status: Signed    : Ricarda Burns LICSW (Licensed Social Worker)    Encounter addended by: ARY Hector on: 8/16/2018  9:59 AM<BR>     Actions taken: Charge Capture section accepted

## 2021-07-03 NOTE — ADDENDUM NOTE
Addendum Note by Veronica Godfrey at 4/21/2020  1:40 PM     Author: Veronica Godfrey Service: -- Author Type: --    Filed: 5/12/2020  6:28 AM Date of Service: 4/21/2020  1:40 PM Status: Signed    : Veronica Godfrey    Encounter addended by: Veronica Godfrey on: 5/12/2020  6:28 AM      Actions taken: Charge Capture section accepted

## 2021-07-03 NOTE — ADDENDUM NOTE
Addendum Note by Arelis Fitzpatrick MD at 7/19/2018  4:35 PM     Author: Arelis Fitzpatrick MD Service: Pain Management Author Type: Physician    Filed: 7/19/2018  4:35 PM Encounter Date: 7/18/2018 Status: Signed    : Arelis Fitzpatrick MD (Physician)    Addended by: ARELIS FITZPATRICK on: 7/19/2018 04:35 PM        Modules accepted: Orders

## 2021-07-03 NOTE — ADDENDUM NOTE
Addendum Note by Veronica Godfrey at 8/10/2020  2:40 PM     Author: Veronica Godfrey Service: -- Author Type: --    Filed: 8/12/2020  9:44 AM Date of Service: 8/10/2020  2:40 PM Status: Signed    : Veronica Godfrey    Encounter addended by: Veronica Godfrey on: 8/12/2020  9:44 AM      Actions taken: Charge Capture section accepted

## 2021-07-03 NOTE — ADDENDUM NOTE
Addendum Note by Latonia Helm RN at 8/1/2018  2:36 PM     Author: Latonia Helm RN Service: -- Author Type: Registered Nurse    Filed: 8/1/2018  2:36 PM Date of Service: 8/1/2018  2:36 PM Status: Signed    : Latonia Helm RN (Registered Nurse)    Encounter addended by: Latonia Heml RN on: 8/1/2018  2:36 PM<BR>     Actions taken: Sign clinical note

## 2021-07-03 NOTE — ADDENDUM NOTE
Addendum Note by Caitlyn Zafar MD at 4/17/2019  3:33 PM     Author: Caitlyn Zafar MD Service: -- Author Type: Physician    Filed: 4/17/2019  3:33 PM Encounter Date: 4/17/2019 Status: Signed    : Caitlyn Zafar MD (Physician)    Addended by: CAITLYN ZAFAR on: 4/17/2019 03:33 PM        Modules accepted: Orders

## 2021-07-03 NOTE — ADDENDUM NOTE
Addendum Note by Arabella Quezada PharmD at 10/23/2017 11:59 PM     Author: Arabella Queazda PharmD Service: -- Author Type: Pharmacist    Filed: 10/31/2017 11:45 AM Date of Service: 10/23/2017 11:59 PM Status: Signed    : Arabella Quezada PharmD (Pharmacist)    Encounter addended by: rAabella Quezada PharmD on: 10/31/2017 11:45 AM<BR>     Actions taken: SmartForm saved, Charge Capture section accepted

## 2021-07-03 NOTE — ADDENDUM NOTE
Addendum Note by Sammie Velasco CMA at 10/23/2017 11:59 PM     Author: Sammie Velasco CMA Service: -- Author Type: Certified Medical Assistant    Filed: 11/22/2017  9:26 AM Date of Service: 10/23/2017 11:59 PM Status: Signed    : Sammie Velasco CMA (Certified Medical Assistant)    Encounter addended by: Sammie Velasco CMA on: 11/22/2017  9:26 AM<BR>     Actions taken: Charge Capture section accepted

## 2021-07-03 NOTE — ADDENDUM NOTE
Addendum Note by Caitlyn Zafar MD at 7/24/2020  5:36 PM     Author: Caitlyn Zafar MD Service: -- Author Type: Physician    Filed: 7/24/2020  5:36 PM Encounter Date: 7/22/2020 Status: Signed    : Caitlyn Zafar MD (Physician)    Addended by: CAITLYN ZAFAR on: 7/24/2020 05:36 PM        Modules accepted: Orders

## 2021-07-03 NOTE — ADDENDUM NOTE
Addendum Note by Caitlyn Zafar MD at 11/10/2017  3:28 PM     Author: Caitlyn Zafar MD Service: -- Author Type: Physician    Filed: 11/10/2017  3:28 PM Encounter Date: 11/3/2017 Status: Signed    : Cailtyn Zafar MD (Physician)    Addended by: CAITLYN ZAFAR on: 11/10/2017 03:28 PM        Modules accepted: Orders

## 2021-07-03 NOTE — ADDENDUM NOTE
Addendum Note by Arelis Fitzpatrick MD at 5/14/2018  3:39 PM     Author: Arelis Fitzpatrick MD Service: -- Author Type: Physician    Filed: 5/14/2018  3:39 PM Encounter Date: 5/14/2018 Status: Signed    : Arelis Fitzpatrick MD (Physician)    Addended by: ARELIS FITZPATRICK on: 5/14/2018 03:39 PM        Modules accepted: Orders

## 2021-07-03 NOTE — ADDENDUM NOTE
Addendum Note by Perla Maya LPN at 2/19/2020  1:20 PM     Author: Perla Maya LPN Service: -- Author Type: Licensed Nurse    Filed: 2/20/2020 10:29 AM Date of Service: 2/19/2020  1:20 PM Status: Signed    : Perla Maya LPN (Licensed Nurse)    Encounter addended by: Perla Maya LPN on: 2/20/2020 10:29 AM      Actions taken: Order Reconciliation Section accessed

## 2021-07-03 NOTE — ADDENDUM NOTE
Addendum Note by Eula Oliver RN at 9/29/2020 10:56 AM     Author: Eula Oliver RN Service: -- Author Type: Registered Nurse    Filed: 9/29/2020 10:56 AM Encounter Date: 9/28/2020 Status: Signed    : Eula Oliver RN (Registered Nurse)    Addended by: EULA OLIVER on: 9/29/2020 10:56 AM        Modules accepted: Orders

## 2021-07-03 NOTE — ADDENDUM NOTE
Addendum Note by Perla Whitehead, RN at 3/21/2019 12:29 PM     Author: Perla Whitehead RN Service: -- Author Type: Registered Nurse    Filed: 3/21/2019 12:29 PM Encounter Date: 3/19/2019 Status: Signed    : Perla Whitehead RN (Registered Nurse)    Addended by: PERLA WHITEHEAD on: 3/21/2019 12:29 PM        Modules accepted: Orders

## 2021-07-03 NOTE — ADDENDUM NOTE
Addendum Note by Veronica Godfrey at 6/15/2020  2:40 PM     Author: Veronica Godfrey Service: -- Author Type: --    Filed: 6/17/2020 10:13 AM Date of Service: 6/15/2020  2:40 PM Status: Signed    : Veronica Godfrey    Encounter addended by: Veronica Godfrey on: 6/17/2020 10:13 AM      Actions taken: Charge Capture section accepted

## 2021-07-03 NOTE — ADDENDUM NOTE
Addendum Note by Caitlyn Zafar MD at 7/5/2017  3:41 PM     Author: Caitlyn Zafar MD Service: -- Author Type: Physician    Filed: 7/5/2017  3:41 PM Encounter Date: 7/5/2017 Status: Signed    : Caitlyn Zafar MD (Physician)    Addended by: CAITLYN ZAFAR on: 7/5/2017 03:41 PM        Modules accepted: Orders

## 2021-07-03 NOTE — ADDENDUM NOTE
Addendum Note by Sammie Velasco CMA at 11/13/2017 11:59 PM     Author: Sammie Velasco CMA Service: -- Author Type: Certified Medical Assistant    Filed: 11/22/2017 10:38 AM Date of Service: 11/13/2017 11:59 PM Status: Signed    : Sammie Velasco CMA (Certified Medical Assistant)    Encounter addended by: Sammie Velasco CMA on: 11/22/2017 10:38 AM<BR>     Actions taken: Charge Capture section accepted

## 2021-07-03 NOTE — ADDENDUM NOTE
Addendum Note by Stacie Damon CNP at 6/27/2017 11:59 PM     Author: Stacie Damon CNP Service: -- Author Type: Nurse Practitioner    Filed: 7/9/2017  7:24 PM Date of Service: 6/27/2017 11:59 PM Status: Signed    : Stacie Damon CNP (Nurse Practitioner)    Encounter addended by: Stacie Damon CNP on: 7/9/2017  7:24 PM<BR>     Actions taken: Sign clinical note

## 2021-07-03 NOTE — ADDENDUM NOTE
Addendum Note by Angie Balbuena, RN at 4/29/2020  9:40 AM     Author: Angie Balbuena RN Service: -- Author Type: Registered Nurse    Filed: 4/29/2020  9:40 AM Encounter Date: 4/28/2020 Status: Signed    : Angie Balbuena, RN (Registered Nurse)    Addended by: ANGIE BALBUENA on: 4/29/2020 09:40 AM        Modules accepted: Orders

## 2021-07-03 NOTE — ADDENDUM NOTE
Addendum Note by Stacie Damon CNP at 6/27/2017 11:59 PM     Author: Stacie Damon CNP Service: -- Author Type: Nurse Practitioner    Filed: 7/9/2017  7:33 PM Date of Service: 6/27/2017 11:59 PM Status: Signed    : Stacie Damon CNP (Nurse Practitioner)    Encounter addended by: Stacie Damon CNP on: 7/9/2017  7:33 PM<BR>     Actions taken: Sign clinical note

## 2021-07-03 NOTE — ADDENDUM NOTE
Addendum Note by Stacie Damon CNP at 6/27/2017 11:59 PM     Author: Stacie Damon CNP Service: -- Author Type: Nurse Practitioner    Filed: 7/9/2017  7:26 PM Date of Service: 6/27/2017 11:59 PM Status: Signed    : Stacie Damon CNP (Nurse Practitioner)    Encounter addended by: Stacie Damon CNP on: 7/9/2017  7:26 PM<BR>     Actions taken: Sign clinical note

## 2021-07-03 NOTE — ADDENDUM NOTE
Addendum Note by Arelis Fitzpatrick MD at 4/5/2019  2:18 PM     Author: Arelis Fitzpatrick MD Service: -- Author Type: Physician    Filed: 4/5/2019  2:18 PM Encounter Date: 4/1/2019 Status: Signed    : Arelis Fitzpatrick MD (Physician)    Addended by: ARELIS FITZPATRICK on: 4/5/2019 02:18 PM        Modules accepted: Orders

## 2021-07-03 NOTE — ADDENDUM NOTE
Addendum Note by Latonia Helm RN at 8/1/2018  2:43 PM     Author: Latonia Helm RN Service: -- Author Type: Registered Nurse    Filed: 8/1/2018  2:43 PM Date of Service: 8/1/2018  2:43 PM Status: Signed    : Latonia Helm RN (Registered Nurse)    Encounter addended by: Latonia Helm RN on: 8/1/2018  2:43 PM<BR>     Actions taken: Order Reconciliation Section accessed

## 2021-07-03 NOTE — ADDENDUM NOTE
Addendum Note by Stacie Damon CNP at 4/28/2017 11:59 PM     Author: Stacie Damon CNP Service: -- Author Type: Nurse Practitioner    Filed: 5/11/2017  2:52 PM Date of Service: 4/28/2017 11:59 PM Status: Signed    : Stacie Damon CNP (Nurse Practitioner)    Encounter addended by: Stacie Damon CNP on: 5/11/2017  2:52 PM<BR>     Actions taken: Sign clinical note

## 2021-07-03 NOTE — ADDENDUM NOTE
Addendum Note by Veronica Godfrey at 10/6/2020  2:40 PM     Author: Veronica Godfrey Service: -- Author Type: --    Filed: 10/8/2020  7:29 AM Date of Service: 10/6/2020  2:40 PM Status: Signed    : Veronica Godfrey    Encounter addended by: Veronica Godfrey on: 10/8/2020  7:29 AM      Actions taken: Charge Capture section accepted

## 2021-07-03 NOTE — ADDENDUM NOTE
Addendum Note by Nikunj Lynn MD at 8/13/2018 11:59 PM     Author: Nikunj Lynn MD Service: -- Author Type: Physician    Filed: 8/14/2018 10:46 AM Date of Service: 8/13/2018 11:59 PM Status: Signed    : Nikunj Lynn MD (Physician)    Encounter addended by: Nikunj Lynn MD on: 8/14/2018 10:46 AM<BR>     Actions taken:  activity accessed

## 2021-07-03 NOTE — ADDENDUM NOTE
Addendum Note by Stacie Damon CNP at 6/27/2017 11:59 PM     Author: Stacie Damon CNP Service: -- Author Type: Nurse Practitioner    Filed: 7/9/2017  7:28 PM Date of Service: 6/27/2017 11:59 PM Status: Signed    : Stacie Damon CNP (Nurse Practitioner)    Encounter addended by: Stacie Damon CNP on: 7/9/2017  7:28 PM<BR>     Actions taken: Sign clinical note

## 2021-07-04 NOTE — LETTER
Letter by Caitlyn Rees MD at      Author: Caitlyn Rees MD Service: -- Author Type: --    Filed:  Encounter Date: 6/22/2021 Status: (Other)         Sandy Spencer  [address] [place] [address]             June 22, 2021         Dear Ms. Spencer,    Below are the results from your recent visit:    Resulted Orders   Basic Metabolic Panel   Result Value Ref Range    Sodium 139 136 - 145 mmol/L    Potassium 4.2 3.5 - 5.0 mmol/L    Chloride 99 98 - 107 mmol/L    CO2 25 22 - 31 mmol/L    Anion Gap, Calculation 15 5 - 18 mmol/L    Glucose 99 70 - 125 mg/dL    Calcium 9.3 8.5 - 10.5 mg/dL    BUN 30 (H) 8 - 28 mg/dL    Creatinine 1.57 (H) 0.60 - 1.10 mg/dL    GFR MDRD Af Amer 39 (L) >60 mL/min/1.73m2    GFR MDRD Non Af Amer 32 (L) >60 mL/min/1.73m2    Narrative    Fasting Glucose reference range is 70-99 mg/dL per  American Diabetes Association (ADA) guidelines.       Your kidneys were a bit more irritated, likely from not getting enough to drink. We can recheck this again next time.     Please call with questions or contact us using [employer].    Sincerely,        Electronically signed by Caitlyn Rees MD

## 2021-07-04 NOTE — ADDENDUM NOTE
Addendum Note by Veronica Godfrey at 4/20/2021  8:40 AM     Author: Veronica Godfrey Service: -- Author Type: --    Filed: 4/22/2021  6:21 AM Date of Service: 4/20/2021  8:40 AM Status: Signed    : Veronica Godfrey    Encounter addended by: Veronica Godfrey on: 4/22/2021  6:21 AM      Actions taken: Charge Capture section accepted

## 2021-07-04 NOTE — ADDENDUM NOTE
Addendum Note by Veronica Godfrey at 6/15/2021 12:40 PM     Author: Veronica Godfrey Service: -- Author Type: --    Filed: 6/17/2021  7:33 AM Date of Service: 6/15/2021 12:40 PM Status: Signed    : Veronica Godfrey    Encounter addended by: Veronica Godfrey on: 6/17/2021  7:33 AM      Actions taken: Charge Capture section accepted

## 2021-07-04 NOTE — ADDENDUM NOTE
Addendum Note by Veronica Godfrey at 2/2/2021 12:40 PM     Author: Veronica Godfrey Service: -- Author Type: --    Filed: 2/4/2021  6:30 AM Date of Service: 2/2/2021 12:40 PM Status: Signed    : Veronica Godfrey    Encounter addended by: Veronica Godfrey on: 2/4/2021  6:30 AM      Actions taken: Charge Capture section accepted

## 2021-07-04 NOTE — LETTER
Letter by Caitlyn Rees MD at      Author: Caitlyn Rees MD Service: -- Author Type: --    Filed:  Encounter Date: 5/26/2021 Status: (Other)         Sandy Spencer  [address] [place] [address]             May 26, 2021         Dear Ms. Spencer,    Below are the results from your recent visit:    Resulted Orders   Comprehensive Metabolic Panel   Result Value Ref Range    Sodium 137 136 - 145 mmol/L    Potassium 4.4 3.5 - 5.0 mmol/L    Chloride 103 98 - 107 mmol/L    CO2 24 22 - 31 mmol/L    Anion Gap, Calculation 10 5 - 18 mmol/L    Glucose 80 70 - 125 mg/dL    BUN 22 8 - 28 mg/dL    Creatinine 1.38 (H) 0.60 - 1.10 mg/dL    GFR MDRD Af Amer 45 (L) >60 mL/min/1.73m2    GFR MDRD Non Af Amer 37 (L) >60 mL/min/1.73m2    Bilirubin, Total 0.5 0.0 - 1.0 mg/dL    Calcium 9.2 8.5 - 10.5 mg/dL    Protein, Total 6.5 6.0 - 8.0 g/dL    Albumin 4.1 3.5 - 5.0 g/dL    Alkaline Phosphatase 55 45 - 120 U/L    AST 28 0 - 40 U/L    ALT 28 0 - 45 U/L    Narrative    Fasting Glucose reference range is 70-99 mg/dL per  American Diabetes Association (ADA) guidelines.   Hemoglobin   Result Value Ref Range    Hemoglobin 12.3 12.0 - 16.0 g/dL     Your kidneys are fairly stable. The rest of your labs look great.     Please call with questions or contact us using [employer].    Sincerely,        Electronically signed by Caitlyn Rees MD

## 2021-07-05 PROBLEM — S06.0X9A CONCUSSION WITH LOSS OF CONSCIOUSNESS: Status: ACTIVE | Noted: 2021-06-15

## 2021-07-06 VITALS
BODY MASS INDEX: 25.42 KG/M2 | HEART RATE: 69 BPM | DIASTOLIC BLOOD PRESSURE: 64 MMHG | OXYGEN SATURATION: 98 % | SYSTOLIC BLOOD PRESSURE: 120 MMHG | WEIGHT: 143.5 LBS

## 2021-07-06 VITALS — HEIGHT: 63 IN | BODY MASS INDEX: 25.15 KG/M2

## 2021-07-07 NOTE — TELEPHONE ENCOUNTER
Telephone Encounter by Luis Felipe Thomas at 6/28/2021  4:45 PM     Author: Luis Felipe Thomas Service: -- Author Type: Patient Access    Filed: 6/28/2021  4:46 PM Encounter Date: 6/25/2021 Status: Signed    : Luis Felipe Thomas (Patient Access)       PA APPROVED:    Approval start date: 03/27/2021  Approval end date:  Until Further Notice    Pharmacy has been notified of approval and will contact patient when medication is ready for pickup.

## 2021-07-07 NOTE — TELEPHONE ENCOUNTER
Central PA team  [phone]  Pool: HE PA MED ([id number])          PA has been initiated.       PA form completed and faxed insurance via Cover My Meds     Key:  TACO JERRY (Key: [id number])     Medication:  Butalbital-Aspirin-Caffeine -40MG capsules    Insurance: [employer]        Response will be received via fax and may take up to 5-10 business days depending on plan

## 2021-07-13 ENCOUNTER — OFFICE VISIT (OUTPATIENT)
Dept: FAMILY MEDICINE | Facility: CLINIC | Age: 81
End: 2021-07-13
Payer: MEDICARE

## 2021-07-13 ENCOUNTER — VIRTUAL VISIT (OUTPATIENT)
Dept: NEUROLOGY | Facility: CLINIC | Age: 81
End: 2021-07-13
Payer: MEDICARE

## 2021-07-13 VITALS
HEART RATE: 78 BPM | BODY MASS INDEX: 26.54 KG/M2 | OXYGEN SATURATION: 96 % | HEIGHT: 63 IN | WEIGHT: 149.8 LBS | DIASTOLIC BLOOD PRESSURE: 62 MMHG | SYSTOLIC BLOOD PRESSURE: 126 MMHG

## 2021-07-13 DIAGNOSIS — F43.10 POSTTRAUMATIC STRESS DISORDER: ICD-10-CM

## 2021-07-13 DIAGNOSIS — N18.31 CHRONIC KIDNEY DISEASE (CKD) STAGE G3A/A1, MODERATELY DECREASED GLOMERULAR FILTRATION RATE (GFR) BETWEEN 45-59 ML/MIN/1.73 SQUARE METER AND ALBUMINURIA CREATININE RATIO LESS THAN 30 MG/G (H): ICD-10-CM

## 2021-07-13 DIAGNOSIS — K86.1 CHRONIC PANCREATITIS, UNSPECIFIED PANCREATITIS TYPE (H): ICD-10-CM

## 2021-07-13 DIAGNOSIS — I27.82 OTHER CHRONIC PULMONARY EMBOLISM WITHOUT ACUTE COR PULMONALE (H): ICD-10-CM

## 2021-07-13 DIAGNOSIS — E03.9 ACQUIRED HYPOTHYROIDISM: ICD-10-CM

## 2021-07-13 DIAGNOSIS — G47.00 INSOMNIA, UNSPECIFIED TYPE: Primary | ICD-10-CM

## 2021-07-13 DIAGNOSIS — R06.02 SHORTNESS OF BREATH: ICD-10-CM

## 2021-07-13 DIAGNOSIS — F43.23 ADJUSTMENT DISORDER WITH MIXED ANXIETY AND DEPRESSED MOOD: Primary | ICD-10-CM

## 2021-07-13 DIAGNOSIS — F06.4 ANXIETY DISORDER DUE TO MEDICAL CONDITION: ICD-10-CM

## 2021-07-13 DIAGNOSIS — R60.0 BILATERAL LOWER EXTREMITY EDEMA: ICD-10-CM

## 2021-07-13 DIAGNOSIS — I10 ESSENTIAL HYPERTENSION: ICD-10-CM

## 2021-07-13 DIAGNOSIS — F33.2 SEVERE EPISODE OF RECURRENT MAJOR DEPRESSIVE DISORDER, WITHOUT PSYCHOTIC FEATURES (H): ICD-10-CM

## 2021-07-13 PROCEDURE — 84439 ASSAY OF FREE THYROXINE: CPT | Performed by: FAMILY MEDICINE

## 2021-07-13 PROCEDURE — 96133 NRPSYC TST EVAL PHYS/QHP EA: CPT | Mod: 95 | Performed by: CLINICAL NEUROPSYCHOLOGIST

## 2021-07-13 PROCEDURE — 96139 PSYCL/NRPSYC TST TECH EA: CPT | Mod: 59 | Performed by: CLINICAL NEUROPSYCHOLOGIST

## 2021-07-13 PROCEDURE — 96132 NRPSYC TST EVAL PHYS/QHP 1ST: CPT | Mod: 95 | Performed by: CLINICAL NEUROPSYCHOLOGIST

## 2021-07-13 PROCEDURE — 80048 BASIC METABOLIC PNL TOTAL CA: CPT | Performed by: FAMILY MEDICINE

## 2021-07-13 PROCEDURE — 36415 COLL VENOUS BLD VENIPUNCTURE: CPT | Performed by: FAMILY MEDICINE

## 2021-07-13 PROCEDURE — 96116 NUBHVL XM PHYS/QHP 1ST HR: CPT | Performed by: CLINICAL NEUROPSYCHOLOGIST

## 2021-07-13 PROCEDURE — 96138 PSYCL/NRPSYC TECH 1ST: CPT | Mod: 59 | Performed by: CLINICAL NEUROPSYCHOLOGIST

## 2021-07-13 PROCEDURE — 83880 ASSAY OF NATRIURETIC PEPTIDE: CPT | Performed by: FAMILY MEDICINE

## 2021-07-13 PROCEDURE — 84443 ASSAY THYROID STIM HORMONE: CPT | Performed by: FAMILY MEDICINE

## 2021-07-13 PROCEDURE — 96136 PSYCL/NRPSYC TST PHY/QHP 1ST: CPT | Performed by: CLINICAL NEUROPSYCHOLOGIST

## 2021-07-13 PROCEDURE — 99214 OFFICE O/P EST MOD 30 MIN: CPT | Performed by: FAMILY MEDICINE

## 2021-07-13 PROCEDURE — 96121 NUBHVL XM PHY/QHP EA ADDL HR: CPT | Performed by: CLINICAL NEUROPSYCHOLOGIST

## 2021-07-13 PROCEDURE — 96137 PSYCL/NRPSYC TST PHY/QHP EA: CPT | Performed by: CLINICAL NEUROPSYCHOLOGIST

## 2021-07-13 RX ORDER — ALIROCUMAB 75 MG/ML
INJECTION, SOLUTION SUBCUTANEOUS
COMMUNITY
Start: 2021-05-17 | End: 2021-11-11

## 2021-07-13 RX ORDER — NORTRIPTYLINE HYDROCHLORIDE 75 MG/1
75 CAPSULE ORAL AT BEDTIME
Qty: 30 CAPSULE | Refills: 1 | Status: SHIPPED | OUTPATIENT
Start: 2021-07-13 | End: 2021-09-14

## 2021-07-13 RX ORDER — AMLODIPINE BESYLATE 5 MG/1
5 TABLET ORAL DAILY
COMMUNITY
Start: 2021-04-28 | End: 2021-08-13

## 2021-07-13 ASSESSMENT — MIFFLIN-ST. JEOR: SCORE: 1128.62

## 2021-07-13 NOTE — LETTER
"    7/13/2021         RE: Sandy Spencer  [address]  [facility] [address]        Dear Colleague,    Thank you for referring your patient, Sandy Spencer, to the [facility]. Please see a copy of my visit note below.    NEUROPSYCHOLOGY TELE-HEALTH FEEDBACK VISIT  [facility]      Telephone Visit  Sandy Spencer is a 80 year old female who is being evaluated via a billable telephone visit.     The patient has been notified of the following:      \"This telephone visit will be conducted via a call between you and your provider. We have found that certain health care needs can be provided without the need for a physical exam.  This service lets us provide the care you need with a phone conversation. If during the course of the call the provider feels a telephone visit is not appropriate, you will not be charged for this service.\"     Patient has given verbal consent to a Telephone visit? Yes  Consent has been obtained for this service by 1 care team member: yes.    Visit Summary  The purpose of today s appointment was to provide feedback regarding Ms. Spencer recent neuropsychological telehealth consult completed on 6/29/2021. We began the session by discussing her experience during the evaluation. I provided Ms. Spencer with detailed feedback regarding her performance on cognitive testing and her pattern of cognitive strengths and weaknesses.  I discussed my overall impressions and recommendations and provided the opportunity for Ms. Spencer to ask any questions that she had about the evaluation. At the end of the session, she indicated that she understood the results and that I had answered all of her questions.       Magali Rodriguez PsyD,   Licensed Clinical Neuropsychologist  [facility] [address], [address]  [place] [address]  Phone: [phone]    Telephone Visit " Details    Type of service:  Telephone Visit  Start Time: 9:00 AM  End Time: 10:00 AM    Originating Location (pt. Location): Home    Distant Location (provider location):  Remote work for [facility])    Mode of Communication:  Telephone    Sandy Spencer was evaluated via a billable telephone visit. For diagnostic and coding purposes, Ms. Spencer was referred for an evaluation of Mild Neurocognitive Disorder. As this is the final date for this Episode of Care (initiated on 6/29/2021) all charges for the entire Episode of Care will be filed today. Please see the 6/29/2021 evaluation for a detailed description of codes and services, including services provided today.      In brief:   1 x 96116  1 x 96121  1 x 96132  3 x 96133  1 x 96136  1 x 96137  1 x 96138  2 x 96139          Again, thank you for allowing me to participate in the care of your patient.        Sincerely,        Magali Rodriguez Psy.D, LP     Patient

## 2021-07-13 NOTE — PROGRESS NOTES
Increasing nortriptyline.     Assessment & Plan     Insomnia, unspecified type  -We will increase her nortriptyline to 75 mg orally nightly.  She will continue with her trazodone as well nightly for now.  If she continues to struggle with insomnia she will need to get referred to the sleep medicine clinic for further evaluation and assessment.  - nortriptyline (PAMELOR) 75 MG capsule  Dispense: 30 capsule; Refill: 1    Shortness of breath   -She has had some worsening shortness of breath, although no kaley orthopnea or PND will check a BNP especially in the setting of her increased lower extremity edema to rule out any obvious signs of heart failure.  -If her labs are normal we discussed we will increase her Lasix to 40 mg in the morning and continue with 20 mg in the afternoon.  - BNP-N terminal pro  - BNP-N terminal pro    Essential hypertension  -Blood pressure is under good control for her today, she will continue on her current medications.    Chronic kidney disease (CKD) stage G3a/A1, moderately decreased glomerular filtration rate (GFR) between 45-59 mL/min/1.73 square meter and albuminuria creatinine ratio less than 30 mg/g  -Does have a history of kidney disease, will update her renal function today.  - Basic metabolic panel  - Basic metabolic panel    Acquired hypothyroidism  -Given her fatigue and swelling we will update her thyroid to make sure that this is not undertreated.  - TSH  - T4, free  - TSH  - T4, free    Bilateral lower extremity edema  -Likely multifactorial including immobility but will update a BNP to make sure that there is not a contributing cause related to heart failure.  - BNP-N terminal pro  - BNP-N terminal pro    Other chronic pulmonary embolism without acute cor pulmonale (H)  -Tolerating her Eliquis without any difficulties.  - apixaban ANTICOAGULANT (ELIQUIS) 5 MG tablet  Dispense: 180 tablet; Refill: 3    Chronic pancreatitis, unspecified pancreatitis type (H)  -History of  "chronic pancreatitis going back to 2019, recent imaging of abdomen showed no obvious pancreatic irritation but would recommend considering a lipase at her next visit.    Anxiety related to medical condition  -We discussed that in regards to her anxiety she is going to continue to work with the pain clinic and start seeing a therapist through the concussion clinic, and if we need to further adjust mental health medications given her complicated history and comorbid conditions I would recommend her seeing a psychiatrist.  She will consider this.        35 minutes spent on the date of the encounter doing chart review, history and exam, documentation and further activities per the note       BMI:   Estimated body mass index is 26.54 kg/m  as calculated from the following:    Height as of this encounter: 1.6 m (5' 3\").    Weight as of this encounter: 67.9 kg (149 lb 12.8 oz).   Weight management plan: Discussed healthy diet and exercise guidelines      Return in about 4 weeks (around 8/10/2021) for with me.    Caitlyn Rees MD, MD  [facility]    Alicia Delgado is a 80 year old who presents for the following health issues     HPI     She is happy to report that she is doing ok at thsi time. She has been told that she very unlikely has dementia causing her issues with her memory. They going thought is that she is maybe having issues with her sleep causing more issues with headahces.     She does not that her legs are cold, maybe swelling and holding fluid. She is on lasix 20 mg two times a day. She does have weight gain as well. She does feel that her legs are tight/taut at times. It's been 7 pounds since May. She does have kidney doctor tell her that if seh put more than two pounds on she should call her. She then was put on the lasix. She does at times have shortness breath and needs to catch her breath.     She does feel like something is off again in the recent past.she left her hat " "in the car today and is having light sensitivity.     She doeshave a spot on the top of her head that has not healed since February. She does put some triple antibiotic ointment on this.     She has had improving nausea and hiccups. Her eyes have improved also.       Review of Systems         Objective    /62   Pulse 78   Ht 1.6 m (5' 3\")   Wt 67.9 kg (149 lb 12.8 oz)   SpO2 96%   Breastfeeding No   BMI 26.54 kg/m    Body mass index is 26.54 kg/m .  Physical Exam   GENERAL: healthy, alert and no distress  EYES: Eyes grossly normal to inspection, PERRL and conjunctivae and sclerae normal  NECK: no adenopathy, no asymmetry, masses, or scars and thyroid normal to palpation  RESP: lungs clear to auscultation - no rales, rhonchi or wheezes  CV: regular rate and rhythm, normal S1 S2, no S3 or S4, no murmur, click or rub, no peripheral edema and peripheral pulses strong  ABDOMEN: soft, nontender, no hepatosplenomegaly, no masses and bowel sounds normal  NEURO: Normal strength and tone, mentation intact and speech normal  PSYCH: mentation appears normal, affect normal/bright  Skin: Top of her scalp small non healing lesion. Shallow based, no surrounding erythema.          "

## 2021-07-13 NOTE — PROGRESS NOTES
"NEUROPSYCHOLOGY TELE-HEALTH FEEDBACK VISIT  [facility]      Telephone Visit  Sandy Spencer is a 80 year old female who is being evaluated via a billable telephone visit.     The patient has been notified of the following:      \"This telephone visit will be conducted via a call between you and your provider. We have found that certain health care needs can be provided without the need for a physical exam.  This service lets us provide the care you need with a phone conversation. If during the course of the call the provider feels a telephone visit is not appropriate, you will not be charged for this service.\"     Patient has given verbal consent to a Telephone visit? Yes  Consent has been obtained for this service by 1 care team member: yes.    Visit Summary  The purpose of today s appointment was to provide feedback regarding Ms. Spencer recent neuropsychological consult completed on 6/29/2021. We began the session by discussing her experience during the evaluation. I provided Ms. Spencer with detailed feedback regarding her performance on cognitive testing and her pattern of cognitive strengths and weaknesses.  I discussed my overall impressions and recommendations and provided the opportunity for Ms. Spencer to ask any questions that she had about the evaluation. At the end of the session, she indicated that she understood the results and that I had answered all of her questions.       Magali Rodriguez PsyD, LP  Licensed Clinical Neuropsychologist  [address], [address] [place] [address]  Phone: [phone]      Telephone Visit Details    Type of service:  Telephone Visit  Start Time: 9:00 AM  End Time: 10:00 AM    Originating Location (pt. Location): Home    Distant Location (provider location):  [facility]    Mode of Communication:  Telephone    Sandy Spencer was evaluated via a " billable telephone visit. For diagnostic and coding purposes, Ms. Spencer was referred for an evaluation of Mild Neurocognitive Disorder. As this is the final date for this Episode of Care (initiated on 6/29/2021) all charges for the entire Episode of Care will be filed today. Please see the 6/29/2021 evaluation for a detailed description of codes and services, including services provided today.      In brief:   1 x 96116  1 x 96121  1 x 96132  3 x 96133  1 x 96136  1 x 96137  1 x 96138  2 x 96139

## 2021-07-13 NOTE — LETTER
2021      Sandymelania Spencer  [address] [place] [address]        Dear ,    We are writing to inform you of your test results.    Your thyroid is normal. You do NOT have heart failure causing your symptoms which is great news. Your kidneys are stable, electrolytes are normal.     Resulted Orders   TSH   Result Value Ref Range    TSH 0.64 0.30 - 5.00 uIU/mL   T4, free   Result Value Ref Range    Free T4 1.15 0.70 - 1.80 ng/dL      Comment:      Performance of the Free T4 test has not been established with  specimens (<= 2 months of age).     Basic metabolic panel   Result Value Ref Range    Sodium 139 136 - 145 mmol/L    Potassium 4.4 3.5 - 5.0 mmol/L    Chloride 103 98 - 107 mmol/L    Carbon Dioxide (CO2) 23 22 - 31 mmol/L    Anion Gap 13 5 - 18 mmol/L    Urea Nitrogen 27 8 - 28 mg/dL    Creatinine 1.76 (H) 0.60 - 1.10 mg/dL    Calcium 9.0 8.5 - 10.5 mg/dL    Glucose 87 70 - 125 mg/dL    GFR Estimate 27 (L) >60 mL/min/1.73m2      Comment:      As of 2021, eGFR is calculated by the CKD-EPI creatinine equation, without race adjustment. eGFR can be influenced by muscle mass, exercise, and diet. The reported eGFR is an estimation only and is only applicable if the renal function is stable.   BNP-N terminal pro   Result Value Ref Range    N Terminal Pro BNP Outpatient 420 0 - 450 pg/mL      Comment:      Reference range shown and results flagged as abnormal are for the outpatient, non acute settings. Establishing a baseline value for each individual patient is useful for follow-up.    Suggested inpatient cut points for confirming diagnosis of CHF in an acute setting are:  >450 pg/mL (age 18 to less than 50)  >900 pg/mL (age 50 to less than 75)  >1800 pg/mL (75 yrs and older)    An inpatient or emergency department NT-proPBNP <300 pg/mL effectively rules out acute CHF, with 99% negative predictive value.           If you have any questions or concerns, please call the clinic at  the number listed above.       Sincerely,      Caitlyn Rees MD, MD
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
## 2021-07-14 ENCOUNTER — TRANSFERRED RECORDS (OUTPATIENT)
Dept: HEALTH INFORMATION MANAGEMENT | Facility: CLINIC | Age: 81
End: 2021-07-14

## 2021-07-14 PROBLEM — R34 LOW URINE OUTPUT: Status: RESOLVED | Noted: 2020-07-23 | Resolved: 2021-04-16

## 2021-07-14 PROBLEM — R00.1 SINUS BRADYCARDIA: Status: RESOLVED | Noted: 2018-09-25 | Resolved: 2021-04-16

## 2021-07-14 LAB
ANION GAP SERPL CALCULATED.3IONS-SCNC: 13 MMOL/L (ref 5–18)
BUN SERPL-MCNC: 27 MG/DL (ref 8–28)
CALCIUM SERPL-MCNC: 9 MG/DL (ref 8.5–10.5)
CHLORIDE BLD-SCNC: 103 MMOL/L (ref 98–107)
CO2 SERPL-SCNC: 23 MMOL/L (ref 22–31)
CREAT SERPL-MCNC: 1.76 MG/DL (ref 0.6–1.1)
GFR SERPL CREATININE-BSD FRML MDRD: 27 ML/MIN/1.73M2
GLUCOSE BLD-MCNC: 87 MG/DL (ref 70–125)
POTASSIUM BLD-SCNC: 4.4 MMOL/L (ref 3.5–5)
SODIUM SERPL-SCNC: 139 MMOL/L (ref 136–145)
T4 FREE SERPL-MCNC: 1.15 NG/DL (ref 0.7–1.8)
TSH SERPL DL<=0.005 MIU/L-ACNC: 0.64 UIU/ML (ref 0.3–5)

## 2021-07-15 ENCOUNTER — HOSPITAL ENCOUNTER (OUTPATIENT)
Dept: PHYSICAL THERAPY | Facility: REHABILITATION | Age: 81
End: 2021-07-15
Payer: MEDICARE

## 2021-07-15 DIAGNOSIS — M25.562 CHRONIC PAIN OF BOTH KNEES: ICD-10-CM

## 2021-07-15 DIAGNOSIS — G44.029 CHRONIC CLUSTER HEADACHE, NOT INTRACTABLE: ICD-10-CM

## 2021-07-15 DIAGNOSIS — M25.562 ARTHRALGIA OF BOTH LOWER LEGS: ICD-10-CM

## 2021-07-15 DIAGNOSIS — G90.511 COMPLEX REGIONAL PAIN SYNDROME TYPE 1 OF RIGHT UPPER EXTREMITY: ICD-10-CM

## 2021-07-15 DIAGNOSIS — M25.561 CHRONIC PAIN OF BOTH KNEES: ICD-10-CM

## 2021-07-15 DIAGNOSIS — G89.4 CHRONIC PAIN SYNDROME: ICD-10-CM

## 2021-07-15 DIAGNOSIS — M48.07 STENOSIS OF LATERAL RECESS OF LUMBOSACRAL SPINE: ICD-10-CM

## 2021-07-15 DIAGNOSIS — M25.561 ARTHRALGIA OF BOTH LOWER LEGS: ICD-10-CM

## 2021-07-15 DIAGNOSIS — M54.2 CERVICALGIA: ICD-10-CM

## 2021-07-15 DIAGNOSIS — R22.40 LOCALIZED SWELLING OF LOWER LEG: ICD-10-CM

## 2021-07-15 DIAGNOSIS — M79.18 MYOFASCIAL PAIN: ICD-10-CM

## 2021-07-15 DIAGNOSIS — G90.50 REFLEX SYMPATHETIC DYSTROPHY: ICD-10-CM

## 2021-07-15 DIAGNOSIS — M54.16 LUMBAR RADICULOPATHY: ICD-10-CM

## 2021-07-15 DIAGNOSIS — G90.523 COMPLEX REGIONAL PAIN SYNDROME TYPE 1 OF BOTH LOWER EXTREMITIES: Primary | ICD-10-CM

## 2021-07-15 DIAGNOSIS — G89.29 CHRONIC PAIN OF BOTH KNEES: ICD-10-CM

## 2021-07-15 PROBLEM — K86.1 CHRONIC PANCREATITIS, UNSPECIFIED PANCREATITIS TYPE (H): Status: ACTIVE | Noted: 2021-07-15

## 2021-07-15 LAB — NT-PROBNP SERPL-MCNC: 420 PG/ML (ref 0–450)

## 2021-07-15 PROCEDURE — 97140 MANUAL THERAPY 1/> REGIONS: CPT | Mod: GP | Performed by: PHYSICAL THERAPIST

## 2021-07-19 DIAGNOSIS — G90.523 COMPLEX REGIONAL PAIN SYNDROME TYPE 1 OF BOTH LOWER EXTREMITIES: Primary | ICD-10-CM

## 2021-07-19 RX ORDER — FENTANYL 75 UG/1
1 PATCH TRANSDERMAL
Qty: 10 PATCH | Refills: 0 | Status: SHIPPED | OUTPATIENT
Start: 2021-07-19 | End: 2021-08-08

## 2021-07-19 NOTE — TELEPHONE ENCOUNTER
Medication being requested: Fentanyl  Last visit date: 6/15/21.  Provider: [facility]  Next visit date: 8/5/21.  Provider: [facility]  Expected follow up: 8 weeks  [facility] visit ([facility]) date: No  UDT date: 4/2021  Agreement date: 5/2021   (Last fill date; name; strength; provider; MME; quantity):  06/08/2021 Fentanyl 75 Mcg/hr Patch Qty: 15 for 30 days Ja Hig  Pertinent between visit information about requested medication (telephone, mychart, prior authorization, concerns, comments):   6/15/21 Plan  Continue with the craniosacral therapy to help with symptoms from your concussion.  Dr. Lynn prescribed butalbital in the form of phrenillin, to help with your postconcussive headaches. Anticipated they will gradually resolve   He will continue with the nortriptyline 10 mg 2 tablets at bedtime.  Continue with the fentanyl 75 mcg patch every 2 days.  Discussed the goal to taper when recovering.  Follow-up with Dr. Lynn in 8 weeks    Script being sent to provider by nurse- dates and quantity:   7/19/21-8/8/21  Pending Prescriptions:                       Disp   Refills    fentaNYL (DURAGESIC) 75 mcg/hr 72 hr patch10 pat*0            Sig: Place 1 patch onto the skin every 48 hours for 20           days    Pharmacy cued: [facility]  Standing orders for withdrawal protocol implemented: NA

## 2021-07-22 ENCOUNTER — HOSPITAL ENCOUNTER (OUTPATIENT)
Dept: PHYSICAL THERAPY | Facility: REHABILITATION | Age: 81
End: 2021-07-22
Payer: MEDICARE

## 2021-07-22 DIAGNOSIS — G90.511 COMPLEX REGIONAL PAIN SYNDROME TYPE 1 OF RIGHT UPPER EXTREMITY: ICD-10-CM

## 2021-07-22 DIAGNOSIS — G89.4 CHRONIC PAIN SYNDROME: ICD-10-CM

## 2021-07-22 DIAGNOSIS — G89.29 CHRONIC PAIN OF BOTH KNEES: ICD-10-CM

## 2021-07-22 DIAGNOSIS — G90.50 REFLEX SYMPATHETIC DYSTROPHY: ICD-10-CM

## 2021-07-22 DIAGNOSIS — M48.07 STENOSIS OF LATERAL RECESS OF LUMBOSACRAL SPINE: ICD-10-CM

## 2021-07-22 DIAGNOSIS — M79.18 MYOFASCIAL PAIN: ICD-10-CM

## 2021-07-22 DIAGNOSIS — G90.523 COMPLEX REGIONAL PAIN SYNDROME TYPE 1 OF BOTH LOWER EXTREMITIES: Primary | ICD-10-CM

## 2021-07-22 DIAGNOSIS — M54.16 LUMBAR RADICULOPATHY: ICD-10-CM

## 2021-07-22 DIAGNOSIS — M25.562 ARTHRALGIA OF BOTH LOWER LEGS: ICD-10-CM

## 2021-07-22 DIAGNOSIS — M25.561 ARTHRALGIA OF BOTH LOWER LEGS: ICD-10-CM

## 2021-07-22 DIAGNOSIS — M25.562 CHRONIC PAIN OF BOTH KNEES: ICD-10-CM

## 2021-07-22 DIAGNOSIS — G44.029 CHRONIC CLUSTER HEADACHE, NOT INTRACTABLE: ICD-10-CM

## 2021-07-22 DIAGNOSIS — M54.2 CERVICALGIA: ICD-10-CM

## 2021-07-22 DIAGNOSIS — M25.561 CHRONIC PAIN OF BOTH KNEES: ICD-10-CM

## 2021-07-22 PROCEDURE — 97140 MANUAL THERAPY 1/> REGIONS: CPT | Mod: GP | Performed by: PHYSICAL THERAPIST

## 2021-07-22 PROCEDURE — 97112 NEUROMUSCULAR REEDUCATION: CPT | Mod: GP | Performed by: PHYSICAL THERAPIST

## 2021-07-22 NOTE — ADDENDUM NOTE
Encounter addended by: Yogi Velazquez, PT on: 7/22/2021 1:28 PM   Actions taken: Charge Capture section accepted

## 2021-07-22 NOTE — ADDENDUM NOTE
Encounter addended by: Yogi Velazquez, PT on: 7/22/2021 1:39 PM   Actions taken: Charge Capture section accepted

## 2021-07-29 ENCOUNTER — HOSPITAL ENCOUNTER (OUTPATIENT)
Dept: PHYSICAL THERAPY | Facility: REHABILITATION | Age: 81
End: 2021-07-29
Payer: MEDICARE

## 2021-07-29 DIAGNOSIS — G90.523 COMPLEX REGIONAL PAIN SYNDROME TYPE 1 OF BOTH LOWER EXTREMITIES: Primary | ICD-10-CM

## 2021-07-29 DIAGNOSIS — G90.50 REFLEX SYMPATHETIC DYSTROPHY: ICD-10-CM

## 2021-07-29 DIAGNOSIS — M54.2 CERVICALGIA: ICD-10-CM

## 2021-07-29 DIAGNOSIS — M25.561 ARTHRALGIA OF BOTH LOWER LEGS: ICD-10-CM

## 2021-07-29 DIAGNOSIS — M79.18 MYOFASCIAL PAIN: ICD-10-CM

## 2021-07-29 DIAGNOSIS — G89.4 CHRONIC PAIN SYNDROME: ICD-10-CM

## 2021-07-29 DIAGNOSIS — M54.16 LUMBAR RADICULOPATHY: ICD-10-CM

## 2021-07-29 DIAGNOSIS — M48.07 STENOSIS OF LATERAL RECESS OF LUMBOSACRAL SPINE: ICD-10-CM

## 2021-07-29 DIAGNOSIS — M25.562 ARTHRALGIA OF BOTH LOWER LEGS: ICD-10-CM

## 2021-07-29 DIAGNOSIS — G90.511 COMPLEX REGIONAL PAIN SYNDROME TYPE 1 OF RIGHT UPPER EXTREMITY: ICD-10-CM

## 2021-07-29 PROCEDURE — 97112 NEUROMUSCULAR REEDUCATION: CPT | Mod: GP | Performed by: PHYSICAL THERAPIST

## 2021-07-29 PROCEDURE — 97140 MANUAL THERAPY 1/> REGIONS: CPT | Mod: GP | Performed by: PHYSICAL THERAPIST

## 2021-08-03 ENCOUNTER — OFFICE VISIT (OUTPATIENT)
Dept: NEUROLOGY | Facility: CLINIC | Age: 81
End: 2021-08-03
Payer: MEDICARE

## 2021-08-03 DIAGNOSIS — F43.10 PTSD (POST-TRAUMATIC STRESS DISORDER): Primary | ICD-10-CM

## 2021-08-03 PROCEDURE — 90834 PSYTX W PT 45 MINUTES: CPT | Performed by: PSYCHOLOGIST
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
## 2021-08-11 ENCOUNTER — VIRTUAL VISIT (OUTPATIENT)
Dept: NEUROLOGY | Facility: CLINIC | Age: 81
End: 2021-08-11
Payer: MEDICARE

## 2021-08-11 DIAGNOSIS — F43.10 PTSD (POST-TRAUMATIC STRESS DISORDER): Primary | ICD-10-CM

## 2021-08-11 PROCEDURE — 90791 PSYCH DIAGNOSTIC EVALUATION: CPT | Performed by: PSYCHOLOGIST

## 2021-08-11 NOTE — LETTER
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   MODERATE problem - (medium, fair,...) - 25-49 %   SEVERE problem - (high, extreme,  ) - 50-95 %   COMPLETE problem - (total, ) -  %    Assessment of client resolving presenting mental health concerns:  Ability  [] low     [x] average     [] high  Motivation [] low     [x] average     [] high  Willingness [] low     [x] average     [] high    Sources/references used in completing this assessment:   Individual interview  Medical record  Adult intake questionnaire  Measures completed: WHODAS, C-SSRS, CAGE, PHQ-9, KT-7, and PCL-5       Initial Therapy Plan     1. Patient and therapist will develop therapeutic relationship.  2. Patient to present for follow up appointment to initiate psychotherapy services.  3. Develop comprehensive treatment plan.       Is patient's family involved in the treatment?  [x] No     [] Yes    If no, Why?  Patient's family was not available at the time of this assessment and patient did not express a desire to have family involved in her care.      Thank you, Dr. Rodriguez, for requesting the participation of psychology service in the  care of this patient.      Again, thank you for allowing me to participate in the care of your patient.        Sincerely,        Deann Meeks Psy.D, LP

## 2021-08-11 NOTE — PROGRESS NOTES
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 health concerns:  Ability  [] low     [x] average     [] high  Motivation [] low     [x] average     [] high  Willingness [] low     [x] average     [] high    Sources/references used in completing this assessment:   Individual interview  Medical record  Adult intake questionnaire  Measures completed: WHODAS, C-SSRS, CAGE, PHQ-9, KT-7, and PCL-5       Initial Therapy Plan     1. Patient and therapist will develop therapeutic relationship.  2. Patient to present for follow up appointment to initiate psychotherapy services.  3. Develop comprehensive treatment plan.       Is patient's family involved in the treatment?  [x] No     [] Yes    If no, Why?  Patient's family was not available at the time of this assessment and patient did not express a desire to have family involved in her care.      Thank you, Dr. Rodriguez, for requesting the participation of psychology service in the care of this patient.

## 2021-08-12 ENCOUNTER — HOSPITAL ENCOUNTER (OUTPATIENT)
Dept: PHYSICAL THERAPY | Facility: REHABILITATION | Age: 81
End: 2021-08-12
Payer: MEDICARE

## 2021-08-12 DIAGNOSIS — M48.07 STENOSIS OF LATERAL RECESS OF LUMBOSACRAL SPINE: ICD-10-CM

## 2021-08-12 DIAGNOSIS — M25.562 ARTHRALGIA OF BOTH LOWER LEGS: ICD-10-CM

## 2021-08-12 DIAGNOSIS — M79.18 MYOFASCIAL PAIN: ICD-10-CM

## 2021-08-12 DIAGNOSIS — G89.4 CHRONIC PAIN SYNDROME: ICD-10-CM

## 2021-08-12 DIAGNOSIS — M54.16 LUMBAR RADICULOPATHY: ICD-10-CM

## 2021-08-12 DIAGNOSIS — G44.029 CHRONIC CLUSTER HEADACHE, NOT INTRACTABLE: ICD-10-CM

## 2021-08-12 DIAGNOSIS — M54.2 CERVICALGIA: ICD-10-CM

## 2021-08-12 DIAGNOSIS — G90.511 COMPLEX REGIONAL PAIN SYNDROME TYPE 1 OF RIGHT UPPER EXTREMITY: ICD-10-CM

## 2021-08-12 DIAGNOSIS — G90.523 COMPLEX REGIONAL PAIN SYNDROME TYPE 1 OF BOTH LOWER EXTREMITIES: Primary | ICD-10-CM

## 2021-08-12 DIAGNOSIS — G90.50 REFLEX SYMPATHETIC DYSTROPHY: ICD-10-CM

## 2021-08-12 DIAGNOSIS — M25.561 ARTHRALGIA OF BOTH LOWER LEGS: ICD-10-CM

## 2021-08-12 PROCEDURE — 97140 MANUAL THERAPY 1/> REGIONS: CPT | Mod: GP | Performed by: PHYSICAL THERAPIST

## 2021-08-12 PROCEDURE — 97112 NEUROMUSCULAR REEDUCATION: CPT | Mod: GP | Performed by: PHYSICAL THERAPIST

## 2021-08-13 ENCOUNTER — OFFICE VISIT (OUTPATIENT)
Dept: FAMILY MEDICINE | Facility: CLINIC | Age: 81
End: 2021-08-13
Payer: MEDICARE

## 2021-08-13 ENCOUNTER — TELEPHONE (OUTPATIENT)
Dept: FAMILY MEDICINE | Facility: CLINIC | Age: 81
End: 2021-08-13

## 2021-08-13 VITALS
BODY MASS INDEX: 26.79 KG/M2 | HEART RATE: 82 BPM | WEIGHT: 151.19 LBS | DIASTOLIC BLOOD PRESSURE: 60 MMHG | HEIGHT: 63 IN | OXYGEN SATURATION: 95 % | SYSTOLIC BLOOD PRESSURE: 116 MMHG

## 2021-08-13 DIAGNOSIS — R51.9 CHRONIC INTRACTABLE HEADACHE, UNSPECIFIED HEADACHE TYPE: ICD-10-CM

## 2021-08-13 DIAGNOSIS — Z11.59 NEED FOR HEPATITIS C SCREENING TEST: ICD-10-CM

## 2021-08-13 DIAGNOSIS — G47.00 INSOMNIA, UNSPECIFIED TYPE: ICD-10-CM

## 2021-08-13 DIAGNOSIS — R49.0 RASPY VOICE: Primary | ICD-10-CM

## 2021-08-13 DIAGNOSIS — G89.29 CHRONIC INTRACTABLE HEADACHE, UNSPECIFIED HEADACHE TYPE: ICD-10-CM

## 2021-08-13 DIAGNOSIS — N18.31 CHRONIC KIDNEY DISEASE (CKD) STAGE G3A/A1, MODERATELY DECREASED GLOMERULAR FILTRATION RATE (GFR) BETWEEN 45-59 ML/MIN/1.73 SQUARE METER AND ALBUMINURIA CREATININE RATIO LESS THAN 30 MG/G (H): ICD-10-CM

## 2021-08-13 LAB
ANION GAP SERPL CALCULATED.3IONS-SCNC: 11 MMOL/L (ref 5–18)
BUN SERPL-MCNC: 26 MG/DL (ref 8–28)
CALCIUM SERPL-MCNC: 9.4 MG/DL (ref 8.5–10.5)
CHLORIDE BLD-SCNC: 100 MMOL/L (ref 98–107)
CO2 SERPL-SCNC: 28 MMOL/L (ref 22–31)
CREAT SERPL-MCNC: 1.51 MG/DL (ref 0.6–1.1)
GFR SERPL CREATININE-BSD FRML MDRD: 33 ML/MIN/1.73M2
GLUCOSE BLD-MCNC: 75 MG/DL (ref 70–125)
POTASSIUM BLD-SCNC: 4.2 MMOL/L (ref 3.5–5)
SODIUM SERPL-SCNC: 139 MMOL/L (ref 136–145)

## 2021-08-13 PROCEDURE — 36415 COLL VENOUS BLD VENIPUNCTURE: CPT | Performed by: FAMILY MEDICINE

## 2021-08-13 PROCEDURE — 80048 BASIC METABOLIC PNL TOTAL CA: CPT | Performed by: FAMILY MEDICINE

## 2021-08-13 PROCEDURE — 99215 OFFICE O/P EST HI 40 MIN: CPT | Performed by: FAMILY MEDICINE

## 2021-08-13 PROCEDURE — 86803 HEPATITIS C AB TEST: CPT | Performed by: FAMILY MEDICINE

## 2021-08-13 RX ORDER — ROSUVASTATIN CALCIUM 20 MG/1
20 TABLET, COATED ORAL AT BEDTIME
COMMUNITY
End: 2021-10-18

## 2021-08-13 RX ORDER — SENNOSIDES A AND B 8.6 MG/1
1-3 TABLET, FILM COATED ORAL DAILY
COMMUNITY
End: 2022-07-13

## 2021-08-13 RX ORDER — MOMETASONE FUROATE MONOHYDRATE 50 UG/1
2 SPRAY, METERED NASAL DAILY
Qty: 17 G | Refills: 1 | Status: SHIPPED | OUTPATIENT
Start: 2021-08-13 | End: 2021-08-17

## 2021-08-13 ASSESSMENT — MIFFLIN-ST. JEOR: SCORE: 1134.91

## 2021-08-13 NOTE — LETTER
August 16, 2021      Sandy Stoutton  [address] [place] [address]        Dear ,     We are writing to inform you of your test results.    Your kidneys are great today which is great news. You do NOT have hepatitis C.     Resulted Orders   Hepatitis C Screen Reflex to HCV RNA Quant and Genotype   Result Value Ref Range    Hepatitis C Antibody Nonreactive Nonreactive    Narrative    Assay performance characteristics have not been established for newborns, infants, and children.   Basic metabolic panel   Result Value Ref Range    Sodium 139 136 - 145 mmol/L    Potassium 4.2 3.5 - 5.0 mmol/L    Chloride 100 98 - 107 mmol/L    Carbon Dioxide (CO2) 28 22 - 31 mmol/L    Anion Gap 11 5 - 18 mmol/L    Urea Nitrogen 26 8 - 28 mg/dL    Creatinine 1.51 (H) 0.60 - 1.10 mg/dL    Calcium 9.4 8.5 - 10.5 mg/dL    Glucose 75 70 - 125 mg/dL    GFR Estimate 33 (L) >60 mL/min/1.73m2      Comment:      As of July 11, 2021, eGFR is calculated by the CKD-EPI creatinine equation, without race adjustment. eGFR can be influenced by muscle mass, exercise, and diet. The reported eGFR is an estimation only and is only applicable if the renal function is stable.       If you have any questions or concerns, please call the clinic at the number listed above.       Sincerely,      Caitlyn Rees MD

## 2021-08-13 NOTE — PROGRESS NOTES
Assessment & Plan     Raspy voice  -I suspect most of her symptoms are secondary to postnasal drip, as she is taking Astelin already we will have her add in Nasonex that she does not think Flonase helped previously.  If this is not helping by the time she follows up to see me next month and we can consider alternative etiologies and treatment options.  - mometasone (NASONEX) 50 MCG/ACT nasal spray; Spray 2 sprays into both nostrils daily    Chronic intractable headache, unspecified headache type  -I suspect at this point these are tension headaches given the location and description of the pain.  She will continue on her nortriptyline as recommended by the concussion clinic and if things are not improving with stress management and therapy and the injection she had through neurology we can consider further treatment options.    Insomnia, unspecified type  -I suspect some of her issues with sleeping are related to her circadian rhythm disorder at this time.  I recommended she aim to get to bed at the same time, set a strict bedtime routine, not stay in bed longer than she should be and to set an alarm for her to get up in the morning on a reliable basis as well.  She will continue on the nortriptyline as well as the trazodone for now also.    Chronic kidney disease (CKD) stage G3a/A1, moderately decreased glomerular filtration rate (GFR) between 45-59 mL/min/1.73 square meter and albuminuria creatinine ratio less than 30 mg/g  -Has been stable, patient would like checked again today.  - Basic metabolic panel; Future  - Basic metabolic panel    Need for hepatitis C screening test  - Hepatitis C Screen Reflex to HCV RNA Quant and Genotype; Future  - Hepatitis C Screen Reflex to HCV RNA Quant and Genotype    44 minutes spent on the date of the encounter doing chart review, history and exam, documentation and further activities per the note           Return for Next Scheduled visit.    Caitlyn Rees MD  [facility]  "[facility] ROMY Delgado is a 80 year old who presents for the following health issues     HPI     She is raspy now. She has been using the Astelin nasal spray daily. She does not know what else she should be doing. She is not taking any OTC allergy pills, historically have not worked for her. Flonase and nasonex have not worked either for her. Her eyes feel itchy when she goes out, they hadn't been fora while. She is still using the drops as well. She     She did have 31 injections of BOTOX and doesn't think that they worked. She is used to having muscle pain and aches. She is still having headaches. Currently hurting behind the left eye and then over the left top of the head and posterior left head. She does think that this is a little different than before. She does note that her neurologist is leaving and moving to [place] unfortunately.     She does still have swelling over her left leg, walked around the zoo. She did have some broken blood vessels as well. She does wonder about if needing to       Review of Systems   With the exception of the aforementioned issues, 12 point, comprehensive ROS was done and was negative.       Objective    /60 (Patient Position: Sitting, Cuff Size: Adult Regular)   Pulse 82   Ht 1.6 m (5' 3\")   Wt 68.6 kg (151 lb 3 oz)   SpO2 95%   BMI 26.78 kg/m    Body mass index is 26.78 kg/m .  Physical Exam   GENERAL: healthy, alert and no distress  MS: no gross musculoskeletal defects noted, no edema  PSYCH: mentation appears normal, affect normal/bright and judgement and insight intact  Trace swelling in her LLE with kinesiotape on the ankle                "

## 2021-08-13 NOTE — PATIENT INSTRUCTIONS
3 Pairs Women's Knee-high Therapeutic Graduated Anti-Fatigue 8-15mmHg Combed Cotton Compression Socks    Dr. Watters's Women's Travel Knee High Socks with Graduated Compression, 1 & 2 Packs

## 2021-08-16 LAB — HCV AB SERPL QL IA: NONREACTIVE

## 2021-08-17 ENCOUNTER — VIRTUAL VISIT (OUTPATIENT)
Dept: PALLIATIVE MEDICINE | Facility: OTHER | Age: 81
End: 2021-08-17
Payer: MEDICARE

## 2021-08-17 VITALS — BODY MASS INDEX: 26.78 KG/M2 | HEIGHT: 63 IN

## 2021-08-17 DIAGNOSIS — F33.2 SEVERE EPISODE OF RECURRENT MAJOR DEPRESSIVE DISORDER, WITHOUT PSYCHOTIC FEATURES (H): ICD-10-CM

## 2021-08-17 DIAGNOSIS — G47.09 OTHER INSOMNIA: ICD-10-CM

## 2021-08-17 DIAGNOSIS — S06.0X9D CONCUSSION WITH LOSS OF CONSCIOUSNESS, SUBSEQUENT ENCOUNTER: ICD-10-CM

## 2021-08-17 DIAGNOSIS — M54.16 LUMBAR RADICULOPATHY: Primary | ICD-10-CM

## 2021-08-17 PROCEDURE — 999N001193 HC VIDEO/TELEPHONE VISIT; NO CHARGE: Performed by: ANESTHESIOLOGY

## 2021-08-17 PROCEDURE — 99443 PR PHYSICIAN TELEPHONE EVALUATION 21-30 MIN: CPT | Mod: 95 | Performed by: ANESTHESIOLOGY

## 2021-08-17 RX ORDER — ROSUVASTATIN CALCIUM 40 MG/1
40 TABLET, COATED ORAL AT BEDTIME
COMMUNITY
End: 2022-01-17

## 2021-08-17 RX ORDER — AMPICILLIN TRIHYDRATE 250 MG
1 CAPSULE ORAL DAILY
Qty: 30 CAPSULE | Refills: 1 | Status: SHIPPED | OUTPATIENT
Start: 2021-08-17 | End: 2021-09-22

## 2021-08-17 RX ORDER — FENTANYL 75 UG/1
1 PATCH TRANSDERMAL
COMMUNITY
End: 2021-08-17

## 2021-08-17 RX ORDER — BUPROPION HYDROCHLORIDE 100 MG/1
100 TABLET, EXTENDED RELEASE ORAL 2 TIMES DAILY
Qty: 30 TABLET | Refills: 1 | Status: SHIPPED | OUTPATIENT
Start: 2021-08-17 | End: 2021-09-14

## 2021-08-17 RX ORDER — FENTANYL 75 UG/1
1 PATCH TRANSDERMAL
Qty: 15 PATCH | Refills: 0 | Status: SHIPPED | OUTPATIENT
Start: 2021-08-17 | End: 2021-09-14

## 2021-08-17 ASSESSMENT — PAIN SCALES - GENERAL: PAINLEVEL: MODERATE PAIN (5)

## 2021-08-17 NOTE — PROGRESS NOTES
Sandy is a 80 year old who is being evaluated via a billable telephone visit.      What phone number would you like to be contacted at? [phone]  How would you like to obtain your AVS? Mail a copy  Patient is here for a follow up appointment with Dr. Nikunj Lynn.   Pain score: 5  Constant or intermittent: constant and intermittent  What does your pain feel like: dull and sharp  Does the pain interfere with:   Work: yes  Walking/distance: no  Sleep: yes  Daily activities: no  Relationships/social life: yes  Mood: yes  F= 7    Perla Maya LPN

## 2021-08-17 NOTE — PROGRESS NOTES
"Sandy is a 80 year old who is being evaluated via a billable telephone visit.      What phone number would you like to be contacted at?   How would you like to obtain your AVS?         Subjective   Sandy is a 80 year old who presents for the following health issues   including chronic regional pain syndrome, headache, and mood disorder.    Reviewing the record she did have neuropsychological testing.  There is not evidence of a cognitive disorder, concerned with anxiety and depression and PTSD.    She did have an initial intake with a psychotherapist.    Reviews she is \"not so good\".  She did request psychological testing given 1 sister with Alzheimer's and mother being evaluated.  She described being troubled at the intake talking about her history of trauma, and it was the 1 year anniversary of her son's death.    She did establish with a new psychotherapist.    She reviews significant concerns with poor sleep.  Nortriptyline is now 75 mg at night.  Also takes high doses of trazodone.  Is using lamotrigine 100 mg 2 tablets twice a day.    Reviewing the record she is on Crestor, is not taking coenzyme Q 10, discussed some patients on statins can have exacerbation depression.    She also notes significant psychosocial stressors still dealing with her son's death and was helping her sister with medical problems.    Notes her appetite is excessive, gained 8 pounds after starting the nortriptyline 151.    I Inquired about Use of Wellbutrin, Had Been on That Years Ago, Did Not Recall Troubles.    Headaches Continue to Be a Problem since the Concussion  The Top of Her Head Is in a Vice.  Craniosacral Therapy Have Been Helpful Though the Therapist Is Retired.  She Recalls Seeing Another Male Therapist, Did Not Think to the Same Approach.  Is Open to Trying Others.    Botox Injections Did Not Seem to Help.    Continues with Some CRPS Symptoms in Her Lower Legs Were More Concerned about Knee Joint Flareup As She Was Having " to Climb Extra Stairs Helping for Her Sister.    She Has Been Using the Fentanyl Patch 75 Mcg, 2 to Visit Her Sister There Were Times Where She Would Go 3 or 4Days before Changing to Did Not Do As Well.    Has Been Using Zonisamide to Take the Place of Topamax for Headaches.  Usually Taking 25 Mg Twice a Day without Benefit.    Reviewed has tried melatonin over-the-counter without benefit, she has had problems with sleepwalking on Ambien.    Has had blood pressure problems in the past, I would be cautious about agents such as prazosin or Zanaflex.  We will have a trial of Belsomra for sleep    HPI         Current Outpatient Medications:      apixaban ANTICOAGULANT (ELIQUIS) 5 MG tablet, Take 1 tablet (5 mg) by mouth 2 times daily, Disp: 180 tablet, Rfl: 3     azelastine (ASTEPRO) 0.15 % nasal spray, two times a day. Use in each nostril as directed, Disp: , Rfl:      buPROPion (WELLBUTRIN SR) 100 MG 12 hr tablet, Take 1 tablet (100 mg) by mouth 2 times daily, Disp: 30 tablet, Rfl: 1     Coenzyme Q10 (COQ10) 200 MG CAPS, Take 1 capsule by mouth daily, Disp: 30 capsule, Rfl: 1     fentaNYL (DURAGESIC) 75 mcg/hr 72 hr patch, Place 1 patch onto the skin every 48 hours remove old patch., Disp: 15 patch, Rfl: 0     lamoTRIgine (LAMICTAL) 100 MG tablet, lamotrigine 100 mg tablet, Disp: , Rfl:      levothyroxine (SYNTHROID/LEVOTHROID) 100 MCG tablet, Take 50 mcg by mouth, Disp: , Rfl:      lisinopril (ZESTRIL) 20 MG tablet, Take 20 mg by mouth, Disp: , Rfl:      naloxone (NARCAN) 4 MG/0.1ML nasal spray, Spray 1 spray (4 mg) into one nostril alternating nostrils once as needed for opioid reversal every 2-3 minutes until assistance arrives, Disp: 0.2 mL, Rfl: 0     nortriptyline (PAMELOR) 75 MG capsule, Take 1 capsule (75 mg) by mouth At Bedtime, Disp: 30 capsule, Rfl: 1     PRALUENT 75 MG/ML injectable pen, INJECT 75MG UNDER THE SKIN EVERY 14 DAYS, Disp: , Rfl:      rosuvastatin (CRESTOR) 20 MG tablet, Take 20 mg by mouth  every 24 hours , Disp: , Rfl:      rosuvastatin (CRESTOR) 40 MG tablet, Take 40 mg by mouth daily Take with 20 mg, Disp: , Rfl:      senna (SENNA-TIME) 8.6 MG tablet, every 24 hours, Disp: , Rfl:      SUMAtriptan (IMITREX) 50 MG tablet, Take 50 mg by mouth at onset of headache , Disp: , Rfl:      Suvorexant (BELSOMRA) 5 MG tablet, One tab bedtime for 6 nights, may increase to two tabs, Disp: 30 tablet, Rfl: 1     traZODone (DESYREL) 100 MG tablet, trazodone 100 mg tablet, Disp: , Rfl:      zonisamide (ZONEGRAN) 25 MG capsule, Take 25 mg by mouth, Disp: , Rfl:     Telephone sounds alert, clear sensorium.  Thought process logical.  Affect is constricted.    No respiratory distress noted.  Review of Systems         Objective    Vitals - Patient Reported  Pain Score: Moderate Pain (5)  Pain Loc: Low Back (low back, hips, RSD, knees, neck, hand, wrist)        Physical Exam         Total time more than 30 minutes

## 2021-08-17 NOTE — LETTER
"    8/17/2021         RE: Sandy Spencer  [address]  [facility] [address]        Dear Colleague,    Thank you for referring your patient, Sandy Spencer, to the [facility]. Please see a copy of my visit note below.    Sandy is a 80 year old who is being evaluated via a billable telephone visit.      What phone number would you like to be contacted at? [phone]  How would you like to obtain your AVS? Mail a copy  Patient is here for a follow up appointment with Dr. Nikunj Lynn.   Pain score: 5  Constant or intermittent: constant and intermittent  What does your pain feel like: dull and sharp  Does the pain interfere with:   Work: yes  Walking/distance: no  Sleep: yes  Daily activities: no  Relationships/social life: yes  Mood: yes  F= 7    Perla Maya LPN    Sandy is a 80 year old who is being evaluated via a billable telephone visit.      What phone number would you like to be contacted at?   How would you like to obtain your AVS?         Subjective   Sandy is a 80 year old who presents for the following health issues   including chronic regional pain syndrome, headache, and mood disorder.    Reviewing the record she did have neuropsychological testing.  There is not evidence of a cognitive disorder, concerned with anxiety and depression and PTSD.    She did have an initial intake with a psychotherapist.    Reviews she is \"not so good\".  She did request psychological testing given 1 sister with Alzheimer's and mother being evaluated.  She described being troubled at the intake talking about her history of trauma, and it was the 1 year anniversary of her son's death.    She did establish with a new psychotherapist.    She reviews significant concerns with poor sleep.  Nortriptyline is now 75 mg at night.  Also takes high doses of trazodone.  Is using lamotrigine 100 mg 2 tablets twice a day.    Reviewing the record she is on Crestor, is not taking coenzyme Q 10, discussed some " patients on statins can have exacerbation depression.    She also notes significant psychosocial stressors still dealing with her son's death and was helping her sister with medical problems.    Notes her appetite is excessive, gained 8 pounds after starting the nortriptyline 151.    I Inquired about Use of Wellbutrin, Had Been on That Years Ago, Did Not Recall Troubles.    Headaches Continue to Be a Problem since the Concussion  The Top of Her Head Is in a Vice.  Craniosacral Therapy Have Been Helpful Though the Therapist Is Retired.  She Recalls Seeing Another Male Therapist, Did Not Think to the Same Approach.  Is Open to Trying Others.    Botox Injections Did Not Seem to Help.    Continues with Some CRPS Symptoms in Her Lower Legs Were More Concerned about Knee Joint Flareup As She Was Having to Climb Extra Stairs Helping for Her Sister.    She Has Been Using the Fentanyl Patch 75 Mcg, 2 to Visit Her Sister There Were Times Where She Would Go 3 or 4Days before Changing to Did Not Do As Well.    Has Been Using Zonisamide to Take the Place of Topamax for Headaches.  Usually Taking 25 Mg Twice a Day without Benefit.    Reviewed has tried melatonin over-the-counter without benefit, she has had problems with sleepwalking on Ambien.    Has had blood pressure problems in the past, I would be cautious about agents such as prazosin or Zanaflex.  We will have a trial of Belsomra for sleep    HPI         Current Outpatient Medications:      apixaban ANTICOAGULANT (ELIQUIS) 5 MG tablet, Take 1 tablet (5 mg) by mouth 2 times daily, Disp: 180 tablet, Rfl: 3     azelastine (ASTEPRO) 0.15 % nasal spray, two times a day. Use in each nostril as directed, Disp: , Rfl:      buPROPion (WELLBUTRIN SR) 100 MG 12 hr tablet, Take 1 tablet (100 mg) by mouth 2 times daily, Disp: 30 tablet, Rfl: 1     Coenzyme Q10 (COQ10) 200 MG CAPS, Take 1 capsule by mouth daily, Disp: 30 capsule, Rfl: 1     fentaNYL (DURAGESIC) 75 mcg/hr 72 hr patch,  Place 1 patch onto the skin every 48 hours remove old patch., Disp: 15 patch, Rfl: 0     lamoTRIgine (LAMICTAL) 100 MG tablet, lamotrigine 100 mg tablet, Disp: , Rfl:      levothyroxine (SYNTHROID/LEVOTHROID) 100 MCG tablet, Take 50 mcg by mouth, Disp: , Rfl:      lisinopril (ZESTRIL) 20 MG tablet, Take 20 mg by mouth, Disp: , Rfl:      naloxone (NARCAN) 4 MG/0.1ML nasal spray, Spray 1 spray (4 mg) into one nostril alternating nostrils once as needed for opioid reversal every 2-3 minutes until assistance arrives, Disp: 0.2 mL, Rfl: 0     nortriptyline (PAMELOR) 75 MG capsule, Take 1 capsule (75 mg) by mouth At Bedtime, Disp: 30 capsule, Rfl: 1     PRALUENT 75 MG/ML injectable pen, INJECT 75MG UNDER THE SKIN EVERY 14 DAYS, Disp: , Rfl:      rosuvastatin (CRESTOR) 20 MG tablet, Take 20 mg by mouth every 24 hours , Disp: , Rfl:      rosuvastatin (CRESTOR) 40 MG tablet, Take 40 mg by mouth daily Take with 20 mg, Disp: , Rfl:      senna (SENNA-TIME) 8.6 MG tablet, every 24 hours, Disp: , Rfl:      SUMAtriptan (IMITREX) 50 MG tablet, Take 50 mg by mouth at onset of headache , Disp: , Rfl:      Suvorexant (BELSOMRA) 5 MG tablet, One tab bedtime for 6 nights, may increase to two tabs, Disp: 30 tablet, Rfl: 1     traZODone (DESYREL) 100 MG tablet, trazodone 100 mg tablet, Disp: , Rfl:      zonisamide (ZONEGRAN) 25 MG capsule, Take 25 mg by mouth, Disp: , Rfl:     Telephone sounds alert, clear sensorium.  Thought process logical.  Affect is constricted.    No respiratory distress noted.  Review of Systems         Objective    Vitals - Patient Reported  Pain Score: Moderate Pain (5)  Pain Loc: Low Back (low back, hips, RSD, knees, neck, hand, wrist)        Physical Exam         Total time more than 30 minutes              Again, thank you for allowing me to participate in the care of your patient.        Sincerely,        ARELIS FITZPATRICK MD

## 2021-08-17 NOTE — PATIENT INSTRUCTIONS
PLAN:    Tinea the nortriptyline 75 mg at bedtime.    Continue lamotrigine 100 mg 2 tablets twice a day.    You will add coenzyme Q 10 200 mg daily to help with depression as you are on a statin.    Wellbutrin 100 mg daily to help with depression.    Zonisamide 25 mg 2 tablets twice a day to help with headaches.    Referral to physical therapy to resume craniosacral therapy with Vilma Granados.    Decrease trazodone as able as some people have side effects.    Continue the fentanyl 5 mcg every 48 hours.    You are working with a new psychotherapist.    Trial of Belsomra to help with sleep, 5 mg tablets 1 at night for 6 nights, if still having problems sleeping and tolerating may take 2 at bedtime    Follow-up with Dr. Lynn in 4 weeks.

## 2021-08-31 ENCOUNTER — TELEPHONE (OUTPATIENT)
Dept: PALLIATIVE MEDICINE | Facility: OTHER | Age: 81
End: 2021-08-31

## 2021-08-31 ENCOUNTER — DOCUMENTATION ONLY (OUTPATIENT)
Dept: OTHER | Facility: CLINIC | Age: 81
End: 2021-08-31

## 2021-08-31 DIAGNOSIS — F06.4 ANXIETY DISORDER DUE TO MEDICAL CONDITION: Primary | ICD-10-CM

## 2021-08-31 NOTE — TELEPHONE ENCOUNTER
Patient called stating that she is having a reaction to the medication she was prescribed and is asking for a call back

## 2021-09-02 ENCOUNTER — VIRTUAL VISIT (OUTPATIENT)
Dept: NEUROLOGY | Facility: CLINIC | Age: 81
End: 2021-09-02
Payer: MEDICARE

## 2021-09-02 DIAGNOSIS — F43.10 PTSD (POST-TRAUMATIC STRESS DISORDER): Primary | ICD-10-CM

## 2021-09-02 PROCEDURE — 90834 PSYTX W PT 45 MINUTES: CPT | Mod: 95 | Performed by: PSYCHOLOGIST

## 2021-09-02 NOTE — TELEPHONE ENCOUNTER
Call back to patient: second attempt  Spoke with patient, she reports cognitive issues with the wellbutrin, losing car keys, talking different and unable to complete her sentences.  Current dosin mg twice daily  Reportedly started the night after this medication was started  Reportedly has worsened over time    Patient reports a recent diagnosis of ADD and ADHD, recent concussion and injury to her hand.    Patient reports that she does want to take something to help with sleep    Wellbutrin has been effective for sleep but other side effects are not manageable.    Patient would like the provider to review notes from a visit today with the concussion clinic psychologist.    Patient is requesting a follow up phone call with other recommendations or suggestions from the provider.

## 2021-09-02 NOTE — PROGRESS NOTES
Psychology Progress Note    Date: September 2, 2021    Time length and type of treatment: 39 minutes (10:07 AM to 10:46 AM), individual therapy    After review of the patient's situation, this visit was changed from an in-person visit to a  video visit via [employer] to reduce the risk of COVID 19 exposure. Patient was informed that policies and procedures that govern in-person sessions would also apply to  video sessions. Patient was also informed that  video sessions would be discontinued when COVID 19 exposure is no longer a concern (as determined by [facility]).     Patient location: Patient home in [place], [place]  Provider location:  [facility], [place], [place]    Patient was in agreement with proceeding with a  video session.      Necessity: This session is necessary to explain the diagnostic assessment and develop a treatment plan.  The reader is invited to review the patient's full treatment plan in the Media section of the patient's Epic medical record.    Psychotherapeutic Technique: This writer utilized motivational interviewing, active listening, reassurance and support in the context of cognitive behavioral therapy to address the above.      MENTAL STATUS EVALUATION  Grooming: Within normal limits  Attire: Appropriate  Age: Appears Stated  Behavior Towards Examiner: Cooperative  Motor Activity: Within normal limits  Eye Contact: Appropriate  Mood: Sad   Affect: tearful  Speech/Language: normal  Attention: Easily distracted  Concentration: Sufficient  Thought Process: tangential  Thought Content: Clear    Orientation: Appeared oriented to person, place, and time, though not formally established  Memory: No evidence of impairment.  Judgement: Adequate  Estimated Intelligence: Average  Demonstrated Insight: Adequate  Fund of Knowledge: Adequate    Intervention:   The diagnostic assessment was explained. Patient agreed with diagnoses, but opined that she also has ADHD  for which she has been medicated in the past. She reported medications were very helpful and questioned whether an ADHD diagnosis might not also be appropriate. Criteria were reviewed and patient affirmed that she often fails to give close attention to details and makes careless mistakes, has difficulty sustaining attention, is often distracted in conversations and misses much of what is said, has difficulty organizing tasks and activities, avoids and dislikes engaging in tasks that required sustained attention, often loses things necessary for activities, is forgetful in daily activities, fidgets (and reported she was fidgeting with her hands as we were talking, though this was not evident in a video visit limited to her face), is restless, talks excessively, blurts out answers before a question is completed (evident in session), has difficulty waiting her turn, and often interrupts or intrudes on others (also evident in session).  Patient's report of a historical diagnosis, current endorsement of symptoms, and presentation in session consistent with self report support a continued diagnosis of Attention-Deficit/Hyperactivity Disorder, combined presentation, and this will be added to patient's diagnoses and treatment plan.    Progress:  A treatment plan was jointly developed    Plan:   We will meet again in 1 week to address the patient's PTSD, anxiety, depressed mood, and ADHD.  Estimated duration of treatment is 10+ individual therapy sessions (93177) at weekly intervals. Treatment is expected to be completed by September 2022.     Diagnosis:  Posttraumatic Stress Disorder  Adjustment Disorder with mixed anxiety and depressed mood  Attention-Deficit/Hyperactivity Disorder, combined presentation

## 2021-09-02 NOTE — LETTER
9/2/2021         RE: Sandy Spencer  [address]  [facility] [address]        Dear Colleague,    Thank you for referring your patient, Sandy Spencer, to the [facility]. Please see a copy of my visit note below.    Psychology Progress Note    Date: September 2, 2021    Time length and type of treatment: 39 minutes (10:07 AM to 10:46 AM), individual therapy    After review of the patient's situation, this visit was changed from an in-person visit to a  video visit via [employer] to reduce the risk of COVID 19 exposure. Patient was informed that policies and procedures that govern in-person sessions would also apply to  video sessions. Patient was also informed that  video sessions would be discontinued when COVID 19 exposure is no longer a concern (as determined by [facility]).     Patient location: Patient home in [place], [place]  Provider location:  [facility], [place], [place]    Patient was in agreement with proceeding with a  video session.      Necessity: This session is necessary to explain the diagnostic assessment and develop a treatment plan.  The reader is invited to review the patient's full treatment plan in the Media section of the patient's Epic medical record.    Psychotherapeutic Technique: This writer utilized motivational interviewing, active listening, reassurance and support in the context of cognitive behavioral therapy to address the above.      MENTAL STATUS EVALUATION  Grooming: Within normal limits  Attire: Appropriate  Age: Appears Stated  Behavior Towards Examiner: Cooperative  Motor Activity: Within normal limits  Eye Contact: Appropriate  Mood: Sad   Affect: tearful  Speech/Language: normal  Attention: Easily distracted  Concentration: Sufficient  Thought Process: tangential  Thought Content: Clear    Orientation: Appeared oriented to person, place, and time, though not formally established  Memory: No  evidence of impairment.  Judgement: Adequate  Estimated Intelligence: Average  Demonstrated Insight: Adequate  Fund of Knowledge: Adequate    Intervention:   The diagnostic assessment was explained. Patient agreed with diagnoses, but opined that she also has ADHD for which she has been medicated in the past. She reported medications were very helpful and questioned whether an ADHD diagnosis might not also be appropriate. Criteria were reviewed and patient affirmed that she often fails to give close attention to details and makes careless mistakes, has difficulty sustaining attention, is often distracted in conversations and misses much of what is said, has difficulty organizing tasks and activities, avoids and dislikes engaging in tasks that required sustained attention, often loses things necessary for activities, is forgetful in daily activities, fidgets (and reported she was fidgeting with her hands as we were talking, though this was not evident in a video visit limited to her face), is restless, talks excessively, blurts out answers before a question is completed (evident in session), has difficulty waiting her turn, and often interrupts or intrudes on others (also evident in session).  Patient's report of a historical diagnosis, current endorsement of symptoms, and presentation in session consistent with self report support a continued diagnosis of Attention-Deficit/Hyperactivity Disorder, combined presentation, and this will be added to patient's diagnoses and treatment plan.    Progress:  A treatment plan was jointly developed    Plan:   We will meet again in 1 week to address the patient's PTSD, anxiety, depressed mood, and ADHD.  Estimated duration of treatment is 10+ individual therapy sessions (87076) at weekly intervals. Treatment is expected to be completed by September 2022.     Diagnosis:  Posttraumatic Stress Disorder  Adjustment Disorder with mixed anxiety and depressed  mood  Attention-Deficit/Hyperactivity Disorder, combined presentation      Again, thank you for allowing me to participate in the care of your patient.        Sincerely,        Deann Meeks Psy.D, LP

## 2021-09-03 RX ORDER — BUSPIRONE HYDROCHLORIDE 10 MG/1
TABLET ORAL
Qty: 30 TABLET | Refills: 1 | Status: SHIPPED | OUTPATIENT
Start: 2021-09-03 | End: 2021-09-14

## 2021-09-03 NOTE — TELEPHONE ENCOUNTER
Patient's call reporting a trial of Wellbutrin had side effects, forgot where she put her keys for to 2 days, confusion.  She did report however she slept better.  She was taking 100 mg morning at bedtime.  I had recommended morning and noon.    She also saw Dr. Anderson in neuropsychology, diagnosis made of ADD.    We discussed options, noting that with Wellbutrin at bedtime reported better sleep, will have a trial of BuSpar, which has a noradrenergic element, relatively safer side effect profile, study with 5 mg twice a day for 3 days 10 mg twice a day and will reassess.

## 2021-09-09 ENCOUNTER — HOSPITAL ENCOUNTER (OUTPATIENT)
Dept: PHYSICAL THERAPY | Facility: REHABILITATION | Age: 81
End: 2021-09-09
Payer: MEDICARE

## 2021-09-09 ENCOUNTER — VIRTUAL VISIT (OUTPATIENT)
Dept: NEUROLOGY | Facility: CLINIC | Age: 81
End: 2021-09-09
Payer: MEDICARE

## 2021-09-09 DIAGNOSIS — G90.50 REFLEX SYMPATHETIC DYSTROPHY: ICD-10-CM

## 2021-09-09 DIAGNOSIS — G90.511 COMPLEX REGIONAL PAIN SYNDROME TYPE 1 OF RIGHT UPPER EXTREMITY: ICD-10-CM

## 2021-09-09 DIAGNOSIS — M54.16 LUMBAR RADICULOPATHY: ICD-10-CM

## 2021-09-09 DIAGNOSIS — M48.07 STENOSIS OF LATERAL RECESS OF LUMBOSACRAL SPINE: ICD-10-CM

## 2021-09-09 DIAGNOSIS — M54.2 CERVICALGIA: ICD-10-CM

## 2021-09-09 DIAGNOSIS — G44.029 CHRONIC CLUSTER HEADACHE, NOT INTRACTABLE: ICD-10-CM

## 2021-09-09 DIAGNOSIS — M79.18 MYOFASCIAL PAIN: ICD-10-CM

## 2021-09-09 DIAGNOSIS — G90.523 COMPLEX REGIONAL PAIN SYNDROME TYPE 1 OF BOTH LOWER EXTREMITIES: Primary | ICD-10-CM

## 2021-09-09 DIAGNOSIS — F43.10 PTSD (POST-TRAUMATIC STRESS DISORDER): Primary | ICD-10-CM

## 2021-09-09 DIAGNOSIS — G89.4 CHRONIC PAIN SYNDROME: ICD-10-CM

## 2021-09-09 PROCEDURE — 90834 PSYTX W PT 45 MINUTES: CPT | Mod: 95 | Performed by: PSYCHOLOGIST

## 2021-09-09 PROCEDURE — 97140 MANUAL THERAPY 1/> REGIONS: CPT | Mod: GP | Performed by: PHYSICAL THERAPIST
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
## 2021-09-14 ENCOUNTER — VIRTUAL VISIT (OUTPATIENT)
Dept: PALLIATIVE MEDICINE | Facility: OTHER | Age: 81
End: 2021-09-14
Payer: MEDICARE

## 2021-09-14 VITALS — HEIGHT: 63 IN | BODY MASS INDEX: 26.78 KG/M2

## 2021-09-14 DIAGNOSIS — M54.16 LUMBAR RADICULOPATHY: ICD-10-CM

## 2021-09-14 DIAGNOSIS — F06.4 ANXIETY DISORDER DUE TO MEDICAL CONDITION: ICD-10-CM

## 2021-09-14 DIAGNOSIS — G47.00 INSOMNIA, UNSPECIFIED TYPE: ICD-10-CM

## 2021-09-14 PROCEDURE — 99214 OFFICE O/P EST MOD 30 MIN: CPT | Mod: 95 | Performed by: ANESTHESIOLOGY

## 2021-09-14 RX ORDER — FENTANYL 75 UG/1
1 PATCH TRANSDERMAL
Qty: 15 PATCH | Refills: 0 | Status: SHIPPED | OUTPATIENT
Start: 2021-09-14 | End: 2021-09-22

## 2021-09-14 RX ORDER — NORTRIPTYLINE HYDROCHLORIDE 75 MG/1
75 CAPSULE ORAL AT BEDTIME
Qty: 30 CAPSULE | Refills: 1 | Status: SHIPPED | OUTPATIENT
Start: 2021-09-14 | End: 2021-10-07

## 2021-09-14 RX ORDER — BUSPIRONE HYDROCHLORIDE 10 MG/1
TABLET ORAL
Qty: 30 TABLET | Refills: 1 | Status: SHIPPED | OUTPATIENT
Start: 2021-09-14 | End: 2021-09-22

## 2021-09-14 ASSESSMENT — PAIN SCALES - GENERAL: PAINLEVEL: MODERATE PAIN (5)

## 2021-09-14 NOTE — LETTER
9/14/2021         RE: Sandy Spencer  [address]  [facility] [address]        Dear Colleague,    Thank you for referring your patient, Sandy Spencer, to the [facility]. Please see a copy of my visit note below.    Sandy is a 80 year old who is being evaluated via a billable video visit.      How would you like to obtain your AVS? Mail a copy  If the video visit is dropped, the invitation should be resent by: Text to cell phone: [phone]  Will anyone else be joining your video visit? No  Patient is here for a follow up appointment with Dr. Nikunj Lynn.   Pain score: 5  Constant or intermittent: constant and intermittent  What does your pain feel like: pretty sharp,crushing and burning  Does the pain interfere with:   Work: yes  Walking/distance: no  Sleep: yes  Daily activities: no  Relationships/social life: yes  Mood: yes  F= 7     Perla Maya LPN    Sandy is a 81 year old who is being evaluated via a billable video visit.      How would you like to obtain your AVS?   If the video visit is dropped, the invitation should be resent by:   Will anyone else be joining your video visit?     Video Start Time:         Subjective   Sandy is a 81 year old who presents for the following health issues   Including lower extremity chronic regional pain syndrome, lumbar disc generative changes, chronic headaches, and mood disorder.    HPI   Graph reviews today having shingles, the 14th time, typical pattern in the distribution daughter buttocks and back of her left leg.  He has scabs and lesions from the blisters.  She reviews concerned that she has had some new symptoms of back pain, but will never have another surgery.  Her usual physical therapist whom she is saw on got benefit and optimal is retired but she would like to continue working with others.        She describes she has been falling as she walks to the left, does not feel that she is dizzy, wonders if she is not lifting her  feet high enough.  Unclear if that correlates with some of the medication changes but had been using BuSpar 10 mg twice a day.  Since she now cut that back is not falling quite so much.  Reviewed this with her primary care provider who she sees this week, getting monthly labs also to monitor her cardiac and kidney function    She has been gaining some weight, also wonders if it is due to the nortriptyline or zonisamide.    She did not get the Belsomra that we want to try for sleep.  Wonders if she is taking some BuSpar at bedtime might help.    She reviews that up with medication to does not want to take them anymore.    She has been working with psychotherapist Dr. Meeks finds it is going very well.    She continues on the fentanyl 50 mcg every 48 hours, feels that the reasonably helpful.    Also continues this is an SMI 25 mg 2 tablets twice a day feels its help to manage some headaches, with nortriptyline 75 mg at bedtime, lamotrigine 100 mg twice a day, and the co-Q10.  With this regimen headaches under relatively better control      Current Outpatient Medications:      apixaban ANTICOAGULANT (ELIQUIS) 5 MG tablet, Take 1 tablet (5 mg) by mouth 2 times daily, Disp: 180 tablet, Rfl: 3     azelastine (ASTEPRO) 0.15 % nasal spray, two times a day. Use in each nostril as directed, Disp: , Rfl:      busPIRone (BUSPAR) 10 MG tablet, One-half tab bedtime, Disp: 30 tablet, Rfl: 1     Coenzyme Q10 (COQ10) 200 MG CAPS, Take 1 capsule by mouth daily, Disp: 30 capsule, Rfl: 1     fentaNYL (DURAGESIC) 75 mcg/hr 72 hr patch, Place 1 patch onto the skin every 48 hours remove old patch., Disp: 15 patch, Rfl: 0     lamoTRIgine (LAMICTAL) 100 MG tablet, lamotrigine 100 mg tablet, Disp: , Rfl:      levothyroxine (SYNTHROID/LEVOTHROID) 100 MCG tablet, Take 50 mcg by mouth, Disp: , Rfl:      lisinopril (ZESTRIL) 20 MG tablet, Take 20 mg by mouth, Disp: , Rfl:      naloxone (NARCAN) 4 MG/0.1ML nasal spray, Spray 1 spray (4 mg) into  one nostril alternating nostrils once as needed for opioid reversal every 2-3 minutes until assistance arrives, Disp: 0.2 mL, Rfl: 0     nortriptyline (PAMELOR) 75 MG capsule, Take 1 capsule (75 mg) by mouth At Bedtime, Disp: 30 capsule, Rfl: 1     PRALUENT 75 MG/ML injectable pen, INJECT 75MG UNDER THE SKIN EVERY 14 DAYS, Disp: , Rfl:      rosuvastatin (CRESTOR) 20 MG tablet, Take 20 mg by mouth every 24 hours , Disp: , Rfl:      rosuvastatin (CRESTOR) 40 MG tablet, Take 40 mg by mouth daily Take with 20 mg, Disp: , Rfl:      senna (SENNA-TIME) 8.6 MG tablet, every 24 hours, Disp: , Rfl:      SUMAtriptan (IMITREX) 50 MG tablet, Take 50 mg by mouth at onset of headache , Disp: , Rfl:      traZODone (DESYREL) 100 MG tablet, trazodone 100 mg tablet, Disp: , Rfl:      zonisamide (ZONEGRAN) 25 MG capsule, Take 25 mg by mouth, Disp: , Rfl:     By video alert, clear sensorium.  Thought process logical.  Affect is restricted.  No respiratory depression      Review of Systems         Objective           Vitals:  No vitals were obtained today due to virtual visit.    Physical Exam         Total time more than 30 minutes          Video-Visit Details    Type of service:  Video Visit    Video End Time:    Originating Location (pt. Location): home    Distant Location (provider location):  [facility]     Platform used for Video Visit: Doximety      Again, thank you for allowing me to participate in the care of your patient.        Sincerely,        ARELIS FITZPATRICK MD

## 2021-09-14 NOTE — PROGRESS NOTES
Sandy is a 80 year old who is being evaluated via a billable video visit.      How would you like to obtain your AVS? Mail a copy  If the video visit is dropped, the invitation should be resent by: Text to cell phone: [phone]  Will anyone else be joining your video visit? No  Patient is here for a follow up appointment with Dr. Nikunj Lynn.   Pain score: 5  Constant or intermittent: constant and intermittent  What does your pain feel like: pretty sharp,crushing and burning  Does the pain interfere with:   Work: yes  Walking/distance: no  Sleep: yes  Daily activities: no  Relationships/social life: yes  Mood: yes  F= 7     Perla Maya LPN

## 2021-09-15 ENCOUNTER — TELEPHONE (OUTPATIENT)
Dept: PALLIATIVE MEDICINE | Facility: OTHER | Age: 81
End: 2021-09-15

## 2021-09-15 NOTE — TELEPHONE ENCOUNTER
RN spoke to patient regarding paper script and patient would like us to wait and see if it can be e-scribed tomorrow.  If e-scribing is still down we can mail it to her pharmacy.  Patient states she [place] not drive[place] especially [place] and [place] come pick it up.

## 2021-09-16 ENCOUNTER — VIRTUAL VISIT (OUTPATIENT)
Dept: NEUROLOGY | Facility: CLINIC | Age: 81
End: 2021-09-16
Payer: MEDICARE

## 2021-09-16 DIAGNOSIS — F43.10 PTSD (POST-TRAUMATIC STRESS DISORDER): Primary | ICD-10-CM

## 2021-09-16 PROCEDURE — 90834 PSYTX W PT 45 MINUTES: CPT | Mod: 95 | Performed by: PSYCHOLOGIST
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
## 2021-09-16 NOTE — PATIENT INSTRUCTIONS
PLAN:  Continue BuSpar 10 mg 1/2 tablet at bedtime.    Referral to continue physical therapy.    You will continue working with your psychotherapist.    You will reviewed the concerns with feeling dizzy and falling with your primary care provider.    Continue with the fentanyl patch 50 mcg every 48 hours.    Follow-up with Dr. Lynn in 8 weeks

## 2021-09-16 NOTE — PROGRESS NOTES
Sandy is a 81 year old who is being evaluated via a billable video visit.      How would you like to obtain your AVS?   If the video visit is dropped, the invitation should be resent by:   Will anyone else be joining your video visit?     Video Start Time:         Subjective   Sandy is a 81 year old who presents for the following health issues   Including lower extremity chronic regional pain syndrome, lumbar disc generative changes, chronic headaches, and mood disorder.    HPI   Graph reviews today having shingles, the 14th time, typical pattern in the distribution daughter buttocks and back of her left leg.  He has scabs and lesions from the blisters.  She reviews concerned that she has had some new symptoms of back pain, but will never have another surgery.  Her usual physical therapist whom she is saw on got benefit and optimal is retired but she would like to continue working with others.        She describes she has been falling as she walks to the left, does not feel that she is dizzy, wonders if she is not lifting her feet high enough.  Unclear if that correlates with some of the medication changes but had been using BuSpar 10 mg twice a day.  Since she now cut that back is not falling quite so much.  Reviewed this with her primary care provider who she sees this week, getting monthly labs also to monitor her cardiac and kidney function    She has been gaining some weight, also wonders if it is due to the nortriptyline or zonisamide.    She did not get the Belsomra that we want to try for sleep.  Wonders if she is taking some BuSpar at bedtime might help.    She reviews that up with medication to does not want to take them anymore.    She has been working with psychotherapist Dr. Meeks finds it is going very well.    She continues on the fentanyl 50 mcg every 48 hours, feels that the reasonably helpful.    Also continues this is an SMI 25 mg 2 tablets twice a day feels its help to manage some headaches,  with nortriptyline 75 mg at bedtime, lamotrigine 100 mg twice a day, and the co-Q10.  With this regimen headaches under relatively better control      Current Outpatient Medications:      apixaban ANTICOAGULANT (ELIQUIS) 5 MG tablet, Take 1 tablet (5 mg) by mouth 2 times daily, Disp: 180 tablet, Rfl: 3     azelastine (ASTEPRO) 0.15 % nasal spray, two times a day. Use in each nostril as directed, Disp: , Rfl:      busPIRone (BUSPAR) 10 MG tablet, One-half tab bedtime, Disp: 30 tablet, Rfl: 1     Coenzyme Q10 (COQ10) 200 MG CAPS, Take 1 capsule by mouth daily, Disp: 30 capsule, Rfl: 1     fentaNYL (DURAGESIC) 75 mcg/hr 72 hr patch, Place 1 patch onto the skin every 48 hours remove old patch., Disp: 15 patch, Rfl: 0     lamoTRIgine (LAMICTAL) 100 MG tablet, lamotrigine 100 mg tablet, Disp: , Rfl:      levothyroxine (SYNTHROID/LEVOTHROID) 100 MCG tablet, Take 50 mcg by mouth, Disp: , Rfl:      lisinopril (ZESTRIL) 20 MG tablet, Take 20 mg by mouth, Disp: , Rfl:      naloxone (NARCAN) 4 MG/0.1ML nasal spray, Spray 1 spray (4 mg) into one nostril alternating nostrils once as needed for opioid reversal every 2-3 minutes until assistance arrives, Disp: 0.2 mL, Rfl: 0     nortriptyline (PAMELOR) 75 MG capsule, Take 1 capsule (75 mg) by mouth At Bedtime, Disp: 30 capsule, Rfl: 1     PRALUENT 75 MG/ML injectable pen, INJECT 75MG UNDER THE SKIN EVERY 14 DAYS, Disp: , Rfl:      rosuvastatin (CRESTOR) 20 MG tablet, Take 20 mg by mouth every 24 hours , Disp: , Rfl:      rosuvastatin (CRESTOR) 40 MG tablet, Take 40 mg by mouth daily Take with 20 mg, Disp: , Rfl:      senna (SENNA-TIME) 8.6 MG tablet, every 24 hours, Disp: , Rfl:      SUMAtriptan (IMITREX) 50 MG tablet, Take 50 mg by mouth at onset of headache , Disp: , Rfl:      traZODone (DESYREL) 100 MG tablet, trazodone 100 mg tablet, Disp: , Rfl:      zonisamide (ZONEGRAN) 25 MG capsule, Take 25 mg by mouth, Disp: , Rfl:     By video alert, clear sensorium.  Thought process  logical.  Affect is restricted.  No respiratory depression      Review of Systems         Objective           Vitals:  No vitals were obtained today due to virtual visit.    Physical Exam         Total time more than 30 minutes          Video-Visit Details    Type of service:  Video Visit    Video End Time:    Originating Location (pt. Location): home    Distant Location (provider location):  [facility]     Platform used for Video Visit: Juanita
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
## 2021-09-17 ENCOUNTER — HOSPITAL ENCOUNTER (INPATIENT)
Facility: CLINIC | Age: 81
LOS: 2 days | Discharge: HOME HEALTH | DRG: 918 | End: 2021-09-19
Attending: EMERGENCY MEDICINE | Admitting: INTERNAL MEDICINE
Payer: MEDICARE

## 2021-09-17 ENCOUNTER — OFFICE VISIT (OUTPATIENT)
Dept: FAMILY MEDICINE | Facility: CLINIC | Age: 81
End: 2021-09-17
Payer: MEDICARE

## 2021-09-17 ENCOUNTER — APPOINTMENT (OUTPATIENT)
Dept: MRI IMAGING | Facility: CLINIC | Age: 81
DRG: 918 | End: 2021-09-17
Payer: MEDICARE

## 2021-09-17 ENCOUNTER — APPOINTMENT (OUTPATIENT)
Dept: CT IMAGING | Facility: CLINIC | Age: 81
DRG: 918 | End: 2021-09-17
Payer: MEDICARE

## 2021-09-17 VITALS — OXYGEN SATURATION: 100 % | SYSTOLIC BLOOD PRESSURE: 94 MMHG | DIASTOLIC BLOOD PRESSURE: 62 MMHG | HEART RATE: 73 BPM

## 2021-09-17 DIAGNOSIS — F99 MENTAL DISORDER, NOT OTHERWISE SPECIFIED: Primary | ICD-10-CM

## 2021-09-17 DIAGNOSIS — Z79.899 POLYPHARMACY: Primary | ICD-10-CM

## 2021-09-17 DIAGNOSIS — M54.16 LUMBAR RADICULOPATHY: ICD-10-CM

## 2021-09-17 DIAGNOSIS — M62.81 MUSCLE WEAKNESS (GENERALIZED): ICD-10-CM

## 2021-09-17 DIAGNOSIS — N18.31 CHRONIC KIDNEY DISEASE (CKD) STAGE G3A/A1, MODERATELY DECREASED GLOMERULAR FILTRATION RATE (GFR) BETWEEN 45-59 ML/MIN/1.73 SQUARE METER AND ALBUMINURIA CREATININE RATIO LESS THAN 30 MG/G (H): ICD-10-CM

## 2021-09-17 DIAGNOSIS — E78.2 MIXED HYPERLIPIDEMIA: ICD-10-CM

## 2021-09-17 DIAGNOSIS — R29.6 FALLS FREQUENTLY: ICD-10-CM

## 2021-09-17 DIAGNOSIS — I10 ESSENTIAL HYPERTENSION: ICD-10-CM

## 2021-09-17 DIAGNOSIS — T14.8XXA ABRASION: ICD-10-CM

## 2021-09-17 DIAGNOSIS — R26.89 SHUFFLING GAIT: ICD-10-CM

## 2021-09-17 DIAGNOSIS — Z79.01 LONG TERM CURRENT USE OF ANTICOAGULANT THERAPY: ICD-10-CM

## 2021-09-17 LAB
ALBUMIN SERPL-MCNC: 4.1 G/DL (ref 3.5–5)
ALBUMIN UR-MCNC: NEGATIVE MG/DL
ALP SERPL-CCNC: 56 U/L (ref 45–120)
ALT SERPL W P-5'-P-CCNC: 11 U/L (ref 0–45)
ANION GAP SERPL CALCULATED.3IONS-SCNC: 11 MMOL/L (ref 5–18)
APPEARANCE UR: CLEAR
AST SERPL W P-5'-P-CCNC: 19 U/L (ref 0–40)
ATRIAL RATE - MUSE: 74 BPM
BACTERIA #/AREA URNS HPF: ABNORMAL /HPF
BASOPHILS # BLD AUTO: 0 10E3/UL (ref 0–0.2)
BASOPHILS NFR BLD AUTO: 0 %
BILIRUB SERPL-MCNC: 0.7 MG/DL (ref 0–1)
BILIRUB UR QL STRIP: NEGATIVE
BUN SERPL-MCNC: 21 MG/DL (ref 8–28)
CALCIUM SERPL-MCNC: 9.3 MG/DL (ref 8.5–10.5)
CHLORIDE BLD-SCNC: 98 MMOL/L (ref 98–107)
CHOLEST SERPL-MCNC: 160 MG/DL
CO2 SERPL-SCNC: 28 MMOL/L (ref 22–31)
COLOR UR AUTO: COLORLESS
CREAT SERPL-MCNC: 1.61 MG/DL (ref 0.6–1.1)
DIASTOLIC BLOOD PRESSURE - MUSE: NORMAL MMHG
EOSINOPHIL # BLD AUTO: 0.2 10E3/UL (ref 0–0.7)
EOSINOPHIL NFR BLD AUTO: 5 %
ERYTHROCYTE [DISTWIDTH] IN BLOOD BY AUTOMATED COUNT: 12.2 % (ref 10–15)
FASTING STATUS PATIENT QL REPORTED: YES
GFR SERPL CREATININE-BSD FRML MDRD: 30 ML/MIN/1.73M2
GLUCOSE BLD-MCNC: 104 MG/DL (ref 79–116)
GLUCOSE BLD-MCNC: 86 MG/DL (ref 70–125)
GLUCOSE UR STRIP-MCNC: NEGATIVE MG/DL
HCT VFR BLD AUTO: 34.6 % (ref 35–47)
HDLC SERPL-MCNC: 86 MG/DL
HGB BLD-MCNC: 11.5 G/DL (ref 11.7–15.7)
HGB UR QL STRIP: ABNORMAL
IMM GRANULOCYTES # BLD: 0 10E3/UL
IMM GRANULOCYTES NFR BLD: 0 %
INR PPP: 1.17 (ref 0.85–1.15)
INTERPRETATION ECG - MUSE: NORMAL
KETONES UR STRIP-MCNC: NEGATIVE MG/DL
LDLC SERPL CALC-MCNC: 58 MG/DL
LEUKOCYTE ESTERASE UR QL STRIP: NEGATIVE
LYMPHOCYTES # BLD AUTO: 1.7 10E3/UL (ref 0.8–5.3)
LYMPHOCYTES NFR BLD AUTO: 36 %
MCH RBC QN AUTO: 32.2 PG (ref 26.5–33)
MCHC RBC AUTO-ENTMCNC: 33.2 G/DL (ref 31.5–36.5)
MCV RBC AUTO: 97 FL (ref 78–100)
MONOCYTES # BLD AUTO: 0.5 10E3/UL (ref 0–1.3)
MONOCYTES NFR BLD AUTO: 11 %
MUCOUS THREADS #/AREA URNS LPF: PRESENT /LPF
NEUTROPHILS # BLD AUTO: 2.2 10E3/UL (ref 1.6–8.3)
NEUTROPHILS NFR BLD AUTO: 48 %
NITRATE UR QL: NEGATIVE
NRBC # BLD AUTO: 0 10E3/UL
NRBC BLD AUTO-RTO: 0 /100
P AXIS - MUSE: 51 DEGREES
PH UR STRIP: 6 [PH] (ref 5–7)
PLATELET # BLD AUTO: 130 10E3/UL (ref 150–450)
POTASSIUM BLD-SCNC: 4 MMOL/L (ref 3.5–5)
PR INTERVAL - MUSE: 146 MS
PROT SERPL-MCNC: 6.9 G/DL (ref 6–8)
QRS DURATION - MUSE: 100 MS
QT - MUSE: 408 MS
QTC - MUSE: 452 MS
R AXIS - MUSE: 19 DEGREES
RBC # BLD AUTO: 3.57 10E6/UL (ref 3.8–5.2)
RBC URINE: 2 /HPF
SARS-COV-2 RNA RESP QL NAA+PROBE: NEGATIVE
SODIUM SERPL-SCNC: 137 MMOL/L (ref 136–145)
SP GR UR STRIP: 1.01 (ref 1–1.03)
SQUAMOUS EPITHELIAL: <1 /HPF
SYSTOLIC BLOOD PRESSURE - MUSE: NORMAL MMHG
T AXIS - MUSE: 78 DEGREES
TRIGL SERPL-MCNC: 79 MG/DL
UROBILINOGEN UR STRIP-MCNC: <2 MG/DL
VENTRICULAR RATE- MUSE: 74 BPM
WBC # BLD AUTO: 4.7 10E3/UL (ref 4–11)
WBC URINE: 3 /HPF

## 2021-09-17 PROCEDURE — 99207 PR CDG-CODE CATEGORY CHANGED: CPT | Performed by: INTERNAL MEDICINE

## 2021-09-17 PROCEDURE — 36415 COLL VENOUS BLD VENIPUNCTURE: CPT | Performed by: EMERGENCY MEDICINE

## 2021-09-17 PROCEDURE — 99215 OFFICE O/P EST HI 40 MIN: CPT | Performed by: FAMILY MEDICINE

## 2021-09-17 PROCEDURE — 85610 PROTHROMBIN TIME: CPT | Performed by: EMERGENCY MEDICINE

## 2021-09-17 PROCEDURE — 80061 LIPID PANEL: CPT | Performed by: FAMILY MEDICINE

## 2021-09-17 PROCEDURE — 99223 1ST HOSP IP/OBS HIGH 75: CPT | Mod: AI | Performed by: INTERNAL MEDICINE

## 2021-09-17 PROCEDURE — 81001 URINALYSIS AUTO W/SCOPE: CPT | Performed by: PHYSICIAN ASSISTANT

## 2021-09-17 PROCEDURE — 258N000003 HC RX IP 258 OP 636: Performed by: PHYSICIAN ASSISTANT

## 2021-09-17 PROCEDURE — 80053 COMPREHEN METABOLIC PANEL: CPT | Performed by: FAMILY MEDICINE

## 2021-09-17 PROCEDURE — 99285 EMERGENCY DEPT VISIT HI MDM: CPT | Mod: 25

## 2021-09-17 PROCEDURE — 258N000003 HC RX IP 258 OP 636: Performed by: INTERNAL MEDICINE

## 2021-09-17 PROCEDURE — 93005 ELECTROCARDIOGRAM TRACING: CPT

## 2021-09-17 PROCEDURE — 72125 CT NECK SPINE W/O DYE: CPT

## 2021-09-17 PROCEDURE — C9803 HOPD COVID-19 SPEC COLLECT: HCPCS

## 2021-09-17 PROCEDURE — U0003 INFECTIOUS AGENT DETECTION BY NUCLEIC ACID (DNA OR RNA); SEVERE ACUTE RESPIRATORY SYNDROME CORONAVIRUS 2 (SARS-COV-2) (CORONAVIRUS DISEASE [COVID-19]), AMPLIFIED PROBE TECHNIQUE, MAKING USE OF HIGH THROUGHPUT TECHNOLOGIES AS DESCRIBED BY CMS-2020-01-R: HCPCS | Performed by: PHYSICIAN ASSISTANT

## 2021-09-17 PROCEDURE — 70450 CT HEAD/BRAIN W/O DYE: CPT

## 2021-09-17 PROCEDURE — 70551 MRI BRAIN STEM W/O DYE: CPT

## 2021-09-17 PROCEDURE — 96361 HYDRATE IV INFUSION ADD-ON: CPT

## 2021-09-17 PROCEDURE — 96374 THER/PROPH/DIAG INJ IV PUSH: CPT

## 2021-09-17 PROCEDURE — 93005 ELECTROCARDIOGRAM TRACING: CPT | Performed by: EMERGENCY MEDICINE

## 2021-09-17 PROCEDURE — 36415 COLL VENOUS BLD VENIPUNCTURE: CPT | Performed by: FAMILY MEDICINE

## 2021-09-17 PROCEDURE — 85025 COMPLETE CBC W/AUTO DIFF WBC: CPT | Performed by: EMERGENCY MEDICINE

## 2021-09-17 PROCEDURE — G0378 HOSPITAL OBSERVATION PER HR: HCPCS

## 2021-09-17 PROCEDURE — 80053 COMPREHEN METABOLIC PANEL: CPT | Performed by: EMERGENCY MEDICINE

## 2021-09-17 PROCEDURE — 250N000011 HC RX IP 250 OP 636: Performed by: PHYSICIAN ASSISTANT

## 2021-09-17 PROCEDURE — 120N000001 HC R&B MED SURG/OB

## 2021-09-17 RX ORDER — SODIUM CHLORIDE 9 MG/ML
INJECTION, SOLUTION INTRAVENOUS CONTINUOUS
Status: DISCONTINUED | OUTPATIENT
Start: 2021-09-17 | End: 2021-09-18

## 2021-09-17 RX ORDER — LISINOPRIL 40 MG/1
40 TABLET ORAL AT BEDTIME
COMMUNITY
Start: 2021-09-15 | End: 2022-01-31

## 2021-09-17 RX ORDER — LORAZEPAM 2 MG/ML
0.5 INJECTION INTRAMUSCULAR ONCE
Status: COMPLETED | OUTPATIENT
Start: 2021-09-17 | End: 2021-09-17

## 2021-09-17 RX ORDER — ONDANSETRON 2 MG/ML
4 INJECTION INTRAMUSCULAR; INTRAVENOUS EVERY 6 HOURS PRN
Status: DISCONTINUED | OUTPATIENT
Start: 2021-09-17 | End: 2021-09-19 | Stop reason: HOSPADM

## 2021-09-17 RX ORDER — FUROSEMIDE 20 MG
40 TABLET ORAL DAILY
COMMUNITY
Start: 2021-09-15 | End: 2021-11-30

## 2021-09-17 RX ORDER — CARVEDILOL 12.5 MG/1
12.5 TABLET ORAL 2 TIMES DAILY WITH MEALS
COMMUNITY
End: 2022-01-31

## 2021-09-17 RX ORDER — ACETAMINOPHEN 650 MG/1
650 SUPPOSITORY RECTAL EVERY 6 HOURS PRN
Status: DISCONTINUED | OUTPATIENT
Start: 2021-09-17 | End: 2021-09-19 | Stop reason: HOSPADM

## 2021-09-17 RX ORDER — LIDOCAINE 40 MG/G
CREAM TOPICAL
Status: DISCONTINUED | OUTPATIENT
Start: 2021-09-17 | End: 2021-09-19 | Stop reason: HOSPADM

## 2021-09-17 RX ORDER — VALACYCLOVIR HYDROCHLORIDE 1 G/1
1000 TABLET, FILM COATED ORAL 3 TIMES DAILY
Status: ON HOLD | COMMUNITY
End: 2021-09-19

## 2021-09-17 RX ORDER — ONDANSETRON 4 MG/1
4 TABLET, ORALLY DISINTEGRATING ORAL EVERY 6 HOURS PRN
Status: DISCONTINUED | OUTPATIENT
Start: 2021-09-17 | End: 2021-09-19 | Stop reason: HOSPADM

## 2021-09-17 RX ORDER — ACETAMINOPHEN 325 MG/1
650 TABLET ORAL EVERY 6 HOURS PRN
Status: DISCONTINUED | OUTPATIENT
Start: 2021-09-17 | End: 2021-09-19 | Stop reason: HOSPADM

## 2021-09-17 RX ORDER — AMLODIPINE BESYLATE 2.5 MG/1
2.5 TABLET ORAL DAILY
Status: ON HOLD | COMMUNITY
End: 2021-09-17

## 2021-09-17 RX ORDER — BUTALBITAL/ACETAMINOPHEN 50MG-325MG
1 TABLET ORAL EVERY 4 HOURS PRN
COMMUNITY
End: 2021-09-23

## 2021-09-17 RX ORDER — LEVOTHYROXINE SODIUM 50 UG/1
50 TABLET ORAL DAILY
Status: ON HOLD | COMMUNITY
Start: 2021-09-15 | End: 2021-09-17

## 2021-09-17 RX ADMIN — SODIUM CHLORIDE 1000 ML: 9 INJECTION, SOLUTION INTRAVENOUS at 15:29

## 2021-09-17 RX ADMIN — SODIUM CHLORIDE, PRESERVATIVE FREE: 5 INJECTION INTRAVENOUS at 21:29

## 2021-09-17 RX ADMIN — LORAZEPAM 0.5 MG: 2 INJECTION INTRAMUSCULAR; INTRAVENOUS at 16:26

## 2021-09-17 ASSESSMENT — MIFFLIN-ST. JEOR
SCORE: 1134.16
SCORE: 1146.06

## 2021-09-17 ASSESSMENT — ACTIVITIES OF DAILY LIVING (ADL)
FALL_HISTORY_WITHIN_LAST_SIX_MONTHS: YES
DIFFICULTY_EATING/SWALLOWING: NO
TOILETING_ISSUES: NO
DIFFICULTY_COMMUNICATING: NO
NUMBER_OF_TIMES_PATIENT_HAS_FALLEN_WITHIN_LAST_SIX_MONTHS: 6
CONCENTRATING,_REMEMBERING_OR_MAKING_DECISIONS_DIFFICULTY: NO
DOING_ERRANDS_INDEPENDENTLY_DIFFICULTY: YES
WALKING_OR_CLIMBING_STAIRS: AMBULATION DIFFICULTY, ASSISTANCE 1 PERSON;STAIR CLIMBING DIFFICULTY, DEPENDENT
WALKING_OR_CLIMBING_STAIRS_DIFFICULTY: YES
WEAR_GLASSES_OR_BLIND: YES
DRESSING/BATHING_DIFFICULTY: NO
WHICH_OF_THE_ABOVE_FUNCTIONAL_RISKS_HAD_A_RECENT_ONSET_OR_CHANGE?: AMBULATION;FALL HISTORY
VISION_MANAGEMENT: GLASSES
DEPENDENT_IADLS:: INDEPENDENT

## 2021-09-17 ASSESSMENT — ENCOUNTER SYMPTOMS
SPEECH DIFFICULTY: 1
WOUND: 1
HEADACHES: 1
ABDOMINAL PAIN: 0
COUGH: 0
FEVER: 0
VOMITING: 0
WEAKNESS: 1
CONFUSION: 1

## 2021-09-17 NOTE — LETTER
September 20, 2021      Sandy Spencer  [address] [place] [address]        Dear ,    We are writing to inform you of your test results.    Your cholesterol looks great.     Resulted Orders   Lipid panel reflex to direct LDL Fasting   Result Value Ref Range    Cholesterol 160 <=199 mg/dL    Triglycerides 79 <=149 mg/dL    Direct Measure HDL 86 >=50 mg/dL      Comment:      HDL Cholesterol Reference Range:     0-2 years:   No reference ranges established for patients under 2 years old  at [facility] for lipid analytes.    2-8 years:  Greater than 45 mg/dL     18 years and older:   Female: Greater than or equal to 50 mg/dL   Male:   Greater than or equal to 40 mg/dL    LDL Cholesterol Calculated 58 <=129 mg/dL    Patient Fasting > 8hrs? Yes    Glucose, whole blood   Result Value Ref Range    Glucose Whole Blood 104 79 - 116 mg/dL       If you have any questions or concerns, please call the clinic at the number listed above.       Sincerely,      Caitlyn Rees MD
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
## 2021-09-17 NOTE — ED PROVIDER NOTES
Expected Patient Referral to ED  11:55 AM    Referring Clinic/Provider:  Franki Beyer    Reason for referral/Clinical facts:  81 F  HTN, Afib, on eliquis  Somewhat slow and confused in clinic, couldn't find her way out of clinic (got lost)  Speech slurred, left foot drop  Possible head injury? Likely needs metabolic workup and head imaging  Coming up via POV, patient will not be driving    Recommendations provided:  Send to ED for eval    Caller was informed that this institution does  possess the capabilities and/or resources to provide for patient and should be transferred to our institution.    Based on the information provided, discussed that this patient likely is not a good candidate for direct admission to this institution and that provider could proceed as such.  If however direct admit is sought and any hurdles encountered, this ED would be happy to see the patient and evaluate.    Informed caller that recommendations provided are recommendations based only on the facts provided and that they responsible to accept or reject the advice, or to seek a formal in person consultation as needed and that this ED will see/treat patient should they arrive.      Rudolph Alford MD  Emergency Medicine  [facility] EMERGENCY ROOM  [address] [id number] [phone]     Rudolph Alford MD  09/17/21 9742

## 2021-09-17 NOTE — ED PROVIDER NOTES
"ED SIGNOUT  Date/Time:9/17/2021 5:59 PM    Patient signed out to me by my colleague, Ramiro Schultz PA-C.  Please see their note for complete history and physical. Plan to follow up on MRI results and disposition.      The creation of this record is based on the scribe s observations of the work being performed by Meagan Al and the provider s statements to them. It was created on their behalf by Judy Andre a trained medical scribe. This document has been checked and approved by the attending provider.      REMAINING ED WORKUP:    Vitals:  BP (!) 145/76   Pulse 68   Temp 98.5  F (36.9  C) (Oral)   Resp 18   Ht 1.613 m (5' 3.5\")   Wt 69.4 kg (153 lb)   SpO2 96%   BMI 26.68 kg/m        Pertinent labs results reviewed   Results for orders placed or performed during the hospital encounter of 09/17/21   MR Brain w/o Contrast    Impression    IMPRESSION:  1.  Examination is degraded by artifact from motion as well as the patient's metallic dental hardware.  2.  There is no visible acute infarct, hemorrhage or other acute intracranial finding.  3.  Mild atrophy and presumed chronic small vessel ischemic changes.   Comprehensive metabolic panel   Result Value Ref Range    Sodium 137 136 - 145 mmol/L    Potassium 4.0 3.5 - 5.0 mmol/L    Chloride 98 98 - 107 mmol/L    Carbon Dioxide (CO2) 28 22 - 31 mmol/L    Anion Gap 11 5 - 18 mmol/L    Urea Nitrogen 21 8 - 28 mg/dL    Creatinine 1.61 (H) 0.60 - 1.10 mg/dL    Calcium 9.3 8.5 - 10.5 mg/dL    Glucose 86 70 - 125 mg/dL    Alkaline Phosphatase 56 45 - 120 U/L    AST 19 0 - 40 U/L    ALT 11 0 - 45 U/L    Protein Total 6.9 6.0 - 8.0 g/dL    Albumin 4.1 3.5 - 5.0 g/dL    Bilirubin Total 0.7 0.0 - 1.0 mg/dL    GFR Estimate 30 (L) >60 mL/min/1.73m2   Result Value Ref Range    INR 1.17 (H) 0.85 - 1.15   UA with Microscopic reflex to Culture    Specimen: Urine, Clean Catch   Result Value Ref Range    Color Urine Colorless Colorless, Straw, Light Yellow, Yellow    " Appearance Urine Clear Clear    Glucose Urine Negative Negative mg/dL    Bilirubin Urine Negative Negative    Ketones Urine Negative Negative mg/dL    Specific Gravity Urine 1.009 1.001 - 1.030    Blood Urine 0.03 mg/dL (A) Negative    pH Urine 6.0 5.0 - 7.0    Protein Albumin Urine Negative Negative mg/dL    Urobilinogen Urine <2.0 <2.0 mg/dL    Nitrite Urine Negative Negative    Leukocyte Esterase Urine Negative Negative    Bacteria Urine Few (A) None Seen /HPF    Mucus Urine Present (A) None Seen /LPF    RBC Urine 2 <=2 /HPF    WBC Urine 3 <=5 /HPF    Squamous Epithelials Urine <1 <=1 /HPF   CBC with platelets and differential   Result Value Ref Range    WBC Count 4.7 4.0 - 11.0 10e3/uL    RBC Count 3.57 (L) 3.80 - 5.20 10e6/uL    Hemoglobin 11.5 (L) 11.7 - 15.7 g/dL    Hematocrit 34.6 (L) 35.0 - 47.0 %    MCV 97 78 - 100 fL    MCH 32.2 26.5 - 33.0 pg    MCHC 33.2 31.5 - 36.5 g/dL    RDW 12.2 10.0 - 15.0 %    Platelet Count 130 (L) 150 - 450 10e3/uL    % Neutrophils 48 %    % Lymphocytes 36 %    % Monocytes 11 %    % Eosinophils 5 %    % Basophils 0 %    % Immature Granulocytes 0 %    NRBCs per 100 WBC 0 <1 /100    Absolute Neutrophils 2.2 1.6 - 8.3 10e3/uL    Absolute Lymphocytes 1.7 0.8 - 5.3 10e3/uL    Absolute Monocytes 0.5 0.0 - 1.3 10e3/uL    Absolute Eosinophils 0.2 0.0 - 0.7 10e3/uL    Absolute Basophils 0.0 0.0 - 0.2 10e3/uL    Absolute Immature Granulocytes 0.0 <=0.0 10e3/uL    Absolute NRBCs 0.0 10e3/uL       Pertinent imaging reviewed   Please see official radiology report.  MR Brain w/o Contrast   Final Result   IMPRESSION:   1.  Examination is degraded by artifact from motion as well as the patient's metallic dental hardware.   2.  There is no visible acute infarct, hemorrhage or other acute intracranial finding.   3.  Mild atrophy and presumed chronic small vessel ischemic changes.           Interventions  Medications   0.9% sodium chloride BOLUS (1,000 mLs Intravenous New Bag 9/17/21 5365)    LORazepam (ATIVAN) injection 0.5 mg (0.5 mg Intravenous Given 9/17/21 2806)        ED Course/MDM:  4:30 PM Signout accepted from Ramiro Schultz PA-C.  Prior records were reviewed.  Diagnostics from this visit are reviewed.  5:36 PM I introduced myself to the patient. We discussed test results.   5:59 PM I staffed the patient with Dr. Krishnamurthy.   6:06 PM I rechecked and updated the patient.    7:28 PM I paged the hospitalist.   8:14 PM 8:14 PM I discussed the case with hospitalist, Dr. Padilla, who accepts the patient for admission.        Sandy Spencer is a 81 year old female with a pertinent history of HTN, atrial fibrillation on Eliquis, who presents to the ED for evaluation of falls and gait difficulty.  The patient's care was signed out to me pending brain MRI from my colleague, ANDREIA Schultz at the end of his shift.  She presented to a previously scheduled routine clinic appointment today, and provider noticed difficulty with gait, some mild confusion, and noted decreased patellar reflexes and a left foot drop, prompting her referral to the ED.  Upon my colleagues exam, patient was neurologically intact with strong 2+ patellar reflexes exhibited.  Blood work and MRI ordered.  Blood work without any significant findings.  She does have a mild anemia with a hemoglobin of 11.5, however this has been baseline for her in the past.  No reported melena/bloody stools, or any findings concerning for an acute bleed.  Her creatinine is at baseline at 1.61.  No metabolic derangements.  Urine without signs of infection.  MRI showed no evidence of acute infarction, hemorrhage or concerning intracranial findings.  Mild atrophy and presumed chronic small vessel ischemic changes noted.  MRI was somewhat degraded by artifact due to motion, and given this, did reassess with head CT and added on cervical spine to rule out occult bleed.  Head CT without intracranial hemorrhage and CT of cervical spine shows no evidence of traumatic  subluxation or cervical spine fracture.  At this time, patient is alert, oriented x3 and answering questions appropriately with normal speech.  Repeated neuro assessment which is intact.  No foot drop noted.  Nursing staff attempted to ambulate the patient, and she is significantly off balance.  She lives at home alone, does not use a walker or cane, and is chronically anticoagulated on Eliquis due to atrial fibrillation.  Given this, did recommend observation admission for PT/OT evaluation and consideration of further discharge planning for her safety.  Patient is comfortable with this plan. Dr. Padilla has accepted her for medical obs admission. I have staffed the patient with Dr. Krishnamurthy, ED MD, who has evaluated the patient and agrees with all aspects of today's care.          1. Muscle weakness (generalized)    2. Shuffling gait          Meagan Al PA-C  [facility] Emergency Department      Meagan Al PA-C  09/17/21 2046

## 2021-09-17 NOTE — PATIENT INSTRUCTIONS
"Please cut the amlodipine in half, taking 2.5 mg orally daily.         Patient Education     Tips for Sleep Hygiene  \"Sleep hygiene\" means having good sleep habits.Follow these tips to sleep better at night:     Get on a schedule. Go to bed and get up at about the same time every day.    Listen to your body. Only try to sleep when you actually feel tired or sleepy.    Be patient. If you haven't been able to get to sleep after about 30 minutes or more, get up and do something calming or boring until you feel sleepy. Then return to bed and try again.    Don't have caffeine (coffee, tea, cola drinks, chocolate and some medicines), alcohol or nicotine (cigarettes). These can make it harder for you to fall asleep and stay asleep.    Use your bed for sleeping only. That means no TV, computer or homework in bed, especially during the evening and before bedtime.    Don't nap during the day. If you must nap, make sure it is for less than 20 minutes.    Create sleep rituals that remind your body it is time to sleep. Examples include breathing exercises, stretching or reading a book.    Avoid all electronic media (smart phone, computer, tablet) within 2 hours of bed time. The \"blue light\" in these devices activates the part of the brain that keeps you awake.    Dim the lights at night.    Get early morning sources of light (walk in the sunshine) to help set sleep patterns at night.    Try a bath or shower before bed. Having a warm bath 1 to 2 hours before bedtime can help you feel sleepy. Hot baths can make you alert, so be mindful of the temperature.    Don't watch the clock. Checking the clock during the night can wake you up. It can also lead to negative thoughts such as, \"I will never fall asleep,\" which can increase anxiety and sleeplessness.    Use a sleep diary. Track your sleep schedule to know your sleep patterns and to see where you can improve.    Get regular exercise every day. Try not to do heavy exercise in the 4 " hours before bedtime.    Eat a healthy, balanced diet.    Try eating a light, healthy snack before bed, but avoid eating a heavy meal.    Create the right sleeping area. A cool, dark, quiet room is best. If needed, try earplugs, fans and blackout curtains.    Keep your daytime routine the same even if you have a bad night sleep. Avoiding activities the next day can make it harder to sleep.  For informational purposes only. Not to replace the advice of your health care provider.   Copyright   2013 [employer]. All rights reserved. [employer] [id number] - 01/16.

## 2021-09-17 NOTE — ED TRIAGE NOTES
3 week history of having difficulty walking.  States has been falling a lot. In February,  she hit her head on a cabinet which keeps bleeding every time she washes her hair. Equal  and no arm drift noted. Primary provider called and concerned about stroke symptoms and sent here.  History of shingles

## 2021-09-17 NOTE — ED PROVIDER NOTES
"Emergency Department Staff Physician Note     I had a face to face encounter with this patient seen by the Advanced Practice Provider (HAIDER), Zoila Al PA-C. I have seen, examined, and discussed the patient with the HAIDER and agree with their assessment and plan of management.    I, Diya Leger, am serving as a scribe to document services personally performed by Dr. Raghu MD, based on my observations and the provider's statements to me.     Relevant HPI:     Sandy Spencer is a 81 year old female who presents to the Emergency Department for evaluation of confusion. Patient sent from PCP clinic for confusion, some slurred speech, and no patellar reflex. PCP concerned for stroke. Patient notes shuffled gait as well. Notes 4 falls in past 3 weeks. On Eliquis.     ED Course:  6:08 PM I received the patient report from the HAIDER (Zoila Al PA-C). I agree with her assessment and plan of management, and I will see the patient myself.  7:00 PM I met with the patient to introduce myself, gather additional history, perform my initial exam, and discuss the plan.       Exam:  /60 (BP Location: Right arm)   Pulse 71   Temp 97.9  F (36.6  C) (Oral)   Resp 18   Ht 1.6 m (5' 3\")   Wt 69 kg (152 lb 2 oz)   SpO2 98%   BMI 26.95 kg/m    General: General: Well-developed elderly female patient, sitting in bed, no distress  CV: RRR, extremities well perfused  Respiratory: Breathing comfortably on room air  Abdomen: Soft       Impression / ED Plan:  Sandy Spencer is a 81 year old female presents to the ED for evaluation of confusion, and difficulty walking.  Symptoms seem subacute in nature.  She does have multiple falls recently and sustained a mild scalp abrasion which is not healing.  She seems to be neurologically intact at this point except for she has a wide-based gait and does not really able to ambulate independently.  Her work-up here including EKG, basic lab evaluation, head CT and cervical spine imaging for " trauma, is all essentially unrevealing.  Attempted a brain MRI looking for any signs of recent stroke, this was motion artifact limited, did not show any suspicious lesions within this limitation.  Was myself in the HAIDER reviewed the work-up with the patient and her .  Since she lives by herself, is falling frequently, and cannot walk here in the emergency department, I do not see is safe choice but to admit her at this point at least for therapy evaluation and potential consideration of placement versus extensive home monitoring.  Will discuss the case with the hospitalist.    Please refer to the Advanced Practice Provider's note for further details and ED course. Agree with history, plan and disposition.     Aram Krishnamurthy MD  Staff Physician  [facility] Emergency Department      Aram Krishnamurthy MD  09/18/21 0019

## 2021-09-17 NOTE — ED NOTES
Introduced self to patient.  Whiteboard updated.  Plan of care and length of time discussed with patient.  Will continue to monitor. Danelle Courtney RN.......9/17/2021 3:42 PM

## 2021-09-18 ENCOUNTER — APPOINTMENT (OUTPATIENT)
Dept: PHYSICAL THERAPY | Facility: CLINIC | Age: 81
DRG: 918 | End: 2021-09-18
Attending: INTERNAL MEDICINE
Payer: MEDICARE

## 2021-09-18 ENCOUNTER — APPOINTMENT (OUTPATIENT)
Dept: ULTRASOUND IMAGING | Facility: CLINIC | Age: 81
DRG: 918 | End: 2021-09-18
Attending: INTERNAL MEDICINE
Payer: MEDICARE

## 2021-09-18 ENCOUNTER — APPOINTMENT (OUTPATIENT)
Dept: OCCUPATIONAL THERAPY | Facility: CLINIC | Age: 81
DRG: 918 | End: 2021-09-18
Attending: INTERNAL MEDICINE
Payer: MEDICARE

## 2021-09-18 LAB
ANION GAP SERPL CALCULATED.3IONS-SCNC: 6 MMOL/L (ref 5–18)
BUN SERPL-MCNC: 21 MG/DL (ref 8–28)
CALCIUM SERPL-MCNC: 8.5 MG/DL (ref 8.5–10.5)
CHLORIDE BLD-SCNC: 104 MMOL/L (ref 98–107)
CO2 SERPL-SCNC: 28 MMOL/L (ref 22–31)
CREAT SERPL-MCNC: 1.44 MG/DL (ref 0.6–1.1)
ERYTHROCYTE [DISTWIDTH] IN BLOOD BY AUTOMATED COUNT: 12.1 % (ref 10–15)
GFR SERPL CREATININE-BSD FRML MDRD: 35 ML/MIN/1.73M2
GLUCOSE BLD-MCNC: 87 MG/DL (ref 70–125)
HCT VFR BLD AUTO: 30.8 % (ref 35–47)
HGB BLD-MCNC: 10 G/DL (ref 11.7–15.7)
MCH RBC QN AUTO: 31.3 PG (ref 26.5–33)
MCHC RBC AUTO-ENTMCNC: 32.5 G/DL (ref 31.5–36.5)
MCV RBC AUTO: 97 FL (ref 78–100)
PLATELET # BLD AUTO: 102 10E3/UL (ref 150–450)
POTASSIUM BLD-SCNC: 4.1 MMOL/L (ref 3.5–5)
RBC # BLD AUTO: 3.19 10E6/UL (ref 3.8–5.2)
SODIUM SERPL-SCNC: 138 MMOL/L (ref 136–145)
WBC # BLD AUTO: 3.5 10E3/UL (ref 4–11)

## 2021-09-18 PROCEDURE — 97130 THER IVNTJ EA ADDL 15 MIN: CPT | Mod: GO

## 2021-09-18 PROCEDURE — 93880 EXTRACRANIAL BILAT STUDY: CPT

## 2021-09-18 PROCEDURE — 97161 PT EVAL LOW COMPLEX 20 MIN: CPT | Mod: GP

## 2021-09-18 PROCEDURE — 99233 SBSQ HOSP IP/OBS HIGH 50: CPT | Performed by: INTERNAL MEDICINE

## 2021-09-18 PROCEDURE — 97129 THER IVNTJ 1ST 15 MIN: CPT | Mod: GO

## 2021-09-18 PROCEDURE — 250N000013 HC RX MED GY IP 250 OP 250 PS 637: Performed by: INTERNAL MEDICINE

## 2021-09-18 PROCEDURE — 97116 GAIT TRAINING THERAPY: CPT | Mod: GP

## 2021-09-18 PROCEDURE — 80048 BASIC METABOLIC PNL TOTAL CA: CPT | Performed by: INTERNAL MEDICINE

## 2021-09-18 PROCEDURE — 97166 OT EVAL MOD COMPLEX 45 MIN: CPT | Mod: GO

## 2021-09-18 PROCEDURE — 36415 COLL VENOUS BLD VENIPUNCTURE: CPT | Performed by: INTERNAL MEDICINE

## 2021-09-18 PROCEDURE — 97535 SELF CARE MNGMENT TRAINING: CPT | Mod: GO

## 2021-09-18 PROCEDURE — 97110 THERAPEUTIC EXERCISES: CPT | Mod: GP

## 2021-09-18 PROCEDURE — 120N000001 HC R&B MED SURG/OB

## 2021-09-18 PROCEDURE — 85027 COMPLETE CBC AUTOMATED: CPT | Performed by: INTERNAL MEDICINE

## 2021-09-18 RX ORDER — FENTANYL 75 UG/1
75 PATCH TRANSDERMAL
Status: DISCONTINUED | OUTPATIENT
Start: 2021-09-19 | End: 2021-09-18

## 2021-09-18 RX ORDER — ROSUVASTATIN CALCIUM 10 MG/1
40 TABLET, COATED ORAL AT BEDTIME
Status: DISCONTINUED | OUTPATIENT
Start: 2021-09-18 | End: 2021-09-19 | Stop reason: HOSPADM

## 2021-09-18 RX ORDER — CARVEDILOL 12.5 MG/1
12.5 TABLET ORAL 2 TIMES DAILY WITH MEALS
Status: DISCONTINUED | OUTPATIENT
Start: 2021-09-18 | End: 2021-09-19 | Stop reason: HOSPADM

## 2021-09-18 RX ORDER — LAMOTRIGINE 100 MG/1
200 TABLET ORAL 2 TIMES DAILY
Status: DISCONTINUED | OUTPATIENT
Start: 2021-09-18 | End: 2021-09-19 | Stop reason: HOSPADM

## 2021-09-18 RX ORDER — ZONISAMIDE 25 MG/1
50 CAPSULE ORAL DAILY
Status: DISCONTINUED | OUTPATIENT
Start: 2021-09-18 | End: 2021-09-19 | Stop reason: HOSPADM

## 2021-09-18 RX ORDER — NORTRIPTYLINE HCL 25 MG
75 CAPSULE ORAL AT BEDTIME
Status: DISCONTINUED | OUTPATIENT
Start: 2021-09-18 | End: 2021-09-19 | Stop reason: HOSPADM

## 2021-09-18 RX ORDER — LISINOPRIL 40 MG/1
40 TABLET ORAL AT BEDTIME
Status: DISCONTINUED | OUTPATIENT
Start: 2021-09-18 | End: 2021-09-19 | Stop reason: HOSPADM

## 2021-09-18 RX ORDER — FENTANYL 75 UG/1
75 PATCH TRANSDERMAL
Status: DISCONTINUED | OUTPATIENT
Start: 2021-09-18 | End: 2021-09-19

## 2021-09-18 RX ORDER — BUSPIRONE HYDROCHLORIDE 5 MG/1
5 TABLET ORAL AT BEDTIME
Status: DISCONTINUED | OUTPATIENT
Start: 2021-09-18 | End: 2021-09-19 | Stop reason: HOSPADM

## 2021-09-18 RX ORDER — ROSUVASTATIN CALCIUM 10 MG/1
20 TABLET, COATED ORAL AT BEDTIME
Status: DISCONTINUED | OUTPATIENT
Start: 2021-09-18 | End: 2021-09-19 | Stop reason: HOSPADM

## 2021-09-18 RX ORDER — VALACYCLOVIR HYDROCHLORIDE 1 G/1
1000 TABLET, FILM COATED ORAL 3 TIMES DAILY
Status: DISCONTINUED | OUTPATIENT
Start: 2021-09-18 | End: 2021-09-18

## 2021-09-18 RX ORDER — BUTALBITAL, ACETAMINOPHEN AND CAFFEINE 50; 325; 40 MG/1; MG/1; MG/1
1 TABLET ORAL EVERY 4 HOURS PRN
Status: DISCONTINUED | OUTPATIENT
Start: 2021-09-18 | End: 2021-09-19 | Stop reason: HOSPADM

## 2021-09-18 RX ORDER — AZELASTINE HCL 205.5 UG/1
2 SPRAY NASAL 2 TIMES DAILY
Status: DISCONTINUED | OUTPATIENT
Start: 2021-09-18 | End: 2021-09-18 | Stop reason: CLARIF

## 2021-09-18 RX ORDER — FUROSEMIDE 40 MG
40 TABLET ORAL DAILY
Status: DISCONTINUED | OUTPATIENT
Start: 2021-09-18 | End: 2021-09-19 | Stop reason: HOSPADM

## 2021-09-18 RX ORDER — AZELASTINE 1 MG/ML
2 SPRAY, METERED NASAL 2 TIMES DAILY
Status: DISCONTINUED | OUTPATIENT
Start: 2021-09-18 | End: 2021-09-19 | Stop reason: HOSPADM

## 2021-09-18 RX ORDER — LEVOTHYROXINE SODIUM 50 UG/1
50 TABLET ORAL DAILY
Status: DISCONTINUED | OUTPATIENT
Start: 2021-09-18 | End: 2021-09-19 | Stop reason: HOSPADM

## 2021-09-18 RX ORDER — BUTALBITAL/ACETAMINOPHEN 50MG-325MG
1 TABLET ORAL EVERY 4 HOURS PRN
Status: DISCONTINUED | OUTPATIENT
Start: 2021-09-18 | End: 2021-09-18

## 2021-09-18 RX ADMIN — VALACYCLOVIR 1000 MG: 1 TABLET, FILM COATED ORAL at 09:03

## 2021-09-18 RX ADMIN — ACETAMINOPHEN 650 MG: 325 TABLET ORAL at 17:59

## 2021-09-18 RX ADMIN — ROSUVASTATIN CALCIUM 20 MG: 10 TABLET, FILM COATED ORAL at 20:36

## 2021-09-18 RX ADMIN — LAMOTRIGINE 200 MG: 100 TABLET ORAL at 20:36

## 2021-09-18 RX ADMIN — LISINOPRIL 40 MG: 40 TABLET ORAL at 20:37

## 2021-09-18 RX ADMIN — AZELASTINE HYDROCHLORIDE 2 SPRAY: 137 SPRAY, METERED NASAL at 20:37

## 2021-09-18 RX ADMIN — LEVOTHYROXINE SODIUM 50 MCG: 0.05 TABLET ORAL at 05:44

## 2021-09-18 RX ADMIN — LAMOTRIGINE 200 MG: 100 TABLET ORAL at 09:04

## 2021-09-18 RX ADMIN — CARVEDILOL 12.5 MG: 12.5 TABLET, FILM COATED ORAL at 09:03

## 2021-09-18 RX ADMIN — FUROSEMIDE 40 MG: 40 TABLET ORAL at 09:03

## 2021-09-18 RX ADMIN — BUSPIRONE HYDROCHLORIDE 5 MG: 5 TABLET ORAL at 00:57

## 2021-09-18 RX ADMIN — ACETAMINOPHEN 650 MG: 325 TABLET ORAL at 09:07

## 2021-09-18 RX ADMIN — APIXABAN 5 MG: 5 TABLET, FILM COATED ORAL at 20:36

## 2021-09-18 RX ADMIN — ROSUVASTATIN CALCIUM 40 MG: 10 TABLET, FILM COATED ORAL at 20:38

## 2021-09-18 RX ADMIN — NORTRIPTYLINE HYDROCHLORIDE 75 MG: 25 CAPSULE ORAL at 20:37

## 2021-09-18 RX ADMIN — BUSPIRONE HYDROCHLORIDE 5 MG: 5 TABLET ORAL at 20:37

## 2021-09-18 RX ADMIN — APIXABAN 5 MG: 5 TABLET, FILM COATED ORAL at 00:57

## 2021-09-18 RX ADMIN — APIXABAN 5 MG: 5 TABLET, FILM COATED ORAL at 09:03

## 2021-09-18 RX ADMIN — AZELASTINE HYDROCHLORIDE 2 SPRAY: 137 SPRAY, METERED NASAL at 13:02

## 2021-09-18 RX ADMIN — FENTANYL 1 PATCH: 75 PATCH TRANSDERMAL at 10:50

## 2021-09-18 RX ADMIN — LAMOTRIGINE 200 MG: 100 TABLET ORAL at 00:57

## 2021-09-18 RX ADMIN — BUTALBITAL, ACETAMINOPHEN AND CAFFEINE 1 TABLET: 50; 325; 40 TABLET ORAL at 14:30

## 2021-09-18 RX ADMIN — ZONISAMIDE 50 MG: 25 CAPSULE ORAL at 15:58

## 2021-09-18 RX ADMIN — BUTALBITAL, ACETAMINOPHEN AND CAFFEINE 1 TABLET: 50; 325; 40 TABLET ORAL at 22:20

## 2021-09-18 RX ADMIN — CARVEDILOL 12.5 MG: 12.5 TABLET, FILM COATED ORAL at 18:16

## 2021-09-18 ASSESSMENT — ACTIVITIES OF DAILY LIVING (ADL): PREVIOUS_RESPONSIBILITIES: MEAL PREP;HOUSEKEEPING;LAUNDRY;MEDICATION MANAGEMENT;FINANCES;DRIVING

## 2021-09-18 ASSESSMENT — MIFFLIN-ST. JEOR: SCORE: 1135.41

## 2021-09-18 NOTE — PROGRESS NOTES
09/18/21 1500   Quick Adds   Type of Visit Initial PT Evaluation   Living Environment   People in home alone   Current Living Arrangements condominium   General Information   Onset of Illness/Injury or Date of Surgery 09/17/21   Referring Physician Dr Trinity Padilla   Pain Assessment   Patient Currently in Pain Yes, see Vital Sign flowsheet   Strength Comprehensive (MMT)   Comment, General Manual Muscle Testing (MMT) Assessment LE grossly WFL   Bed Mobility   Comment (Bed Mobility) no limitations noted, pt independent with bed mobility   Transfers   Transfer Safety Concerns Noted decreased sequencing ability   Impairments Contributing to Impaired Transfers impaired balance   Transfer Safety Comments Sit to stand with supervision, verbal cues for safety   Gait/Stairs (Locomotion)   [place] (Gait) supervision   Assistive Device (Gait)   (none)   Distance in Feet (Required for LE Total Joints) 150'x1, 100'x1   Pattern (Gait) step-through   Deviations/Abnormal Patterns (Gait) other (see comments)  (path deviation to the L, lacking arm swing)   Clinical Impression   Criteria for Skilled Therapeutic Intervention yes, treatment indicated   PT Diagnosis (PT) impaired functional mobility   Influenced by the following impairments weakness, pain   Functional limitations due to impairments transfers, ambulation, dynamic balance   Clinical Presentation Evolving/Changing   Clinical Presentation Rationale pt presents as clinically diagnosed   Clinical Decision Making (Complexity) low complexity   Therapy Frequency (PT) Daily   Predicted Duration of Therapy Intervention (days/wks) 2 days   Planned Therapy Interventions (PT) balance training;gait training;transfer training   Anticipated Equipment Needs at Discharge (PT) cane, straight   Risk & Benefits of therapy have been explained evaluation/treatment results reviewed;patient   PT Discharge Planning    PT Discharge Recommendation (DC Rec) home with home care physical  therapy   PT Rationale for DC Rec pt would benefit from assistance with higher level balance training and instruction in use of least restrictive assistive device to prevent falls.   Total Evaluation Time   Total Evaluation Time (Minutes) 10

## 2021-09-18 NOTE — PHARMACY-ADMISSION MEDICATION HISTORY
"Pharmacy Note - Admission Medication History    Pertinent Provider Information:     She has extensive allergy list. She reports the only known allergy she has is sulfa drugs with \"anaphylactic shock in the past\" and flexeril with ulcers in the mouth.     Patient was unsure of current carvedilol dose. Per fill history and clinic note, most recent dose is 12.5mg BID. She is not entirely sure whether her allergy list is \"appropriate\" and would like a review and removal of some allergies (she understands some of them were more d/t side effects). I was unable to complete at this time. She does not know about amoxicillin allergy.    No recent fill history of eliquis HOWEVER patient states she fills from [facility] d/t Eliquis is too costly in the [place].  (albe to verbalize she takes 5 mg twice daily).         ______________________________________________________________________    Prior To Admission (PTA) med list completed and updated in EMR.       PTA Med List   Medication Sig Note Last Dose     apixaban ANTICOAGULANT (ELIQUIS) 5 MG tablet Take 1 tablet (5 mg) by mouth 2 times daily 9/17/2021: Fills from [facility] 9/17/2021 at am     azelastine (ASTEPRO) 0.15 % nasal spray Spray 2 sprays into both nostrils 2 times daily   9/17/2021 at am     busPIRone (BUSPAR) 10 MG tablet One-half tab bedtime  9/16/2021 at pm     Butalbital-Acetaminophen (PHRENILIN)  MG TABS per tablet Take 1 tablet by mouth every 4 hours as needed for headaches  Past Week at Unknown time     carvedilol (COREG) 12.5 MG tablet Take 12.5 mg by mouth 2 times daily (with meals)  9/17/2021 at am     fentaNYL (DURAGESIC) 75 mcg/hr 72 hr patch Place 1 patch onto the skin every 48 hours remove old patch.  9/16/2021 at am; due new patch 9/19 AM     furosemide (LASIX) 20 MG tablet Take 40 mg by mouth daily   9/17/2021 at am     lamoTRIgine (LAMICTAL) 100 MG tablet Take 200 mg by mouth 2 times daily   9/17/2021 at am     levothyroxine (SYNTHROID/LEVOTHROID) 50 MCG " tablet Take 50 mcg by mouth   9/17/2021 at am     lisinopril (ZESTRIL) 40 MG tablet Take 40 mg by mouth At Bedtime   9/16/2021 at pm     naloxone (NARCAN) 4 MG/0.1ML nasal spray Spray 1 spray (4 mg) into one nostril alternating nostrils once as needed for opioid reversal every 2-3 minutes until assistance arrives  Unknown at Unknown time     nortriptyline (PAMELOR) 75 MG capsule Take 1 capsule (75 mg) by mouth At Bedtime  9/16/2021 at pm     PRALUENT 75 MG/ML injectable pen INJECT 75MG UNDER THE SKIN EVERY 14 DAYS  9/5/2021 at due on 9/19/21     rosuvastatin (CRESTOR) 20 MG tablet Take 20 mg by mouth At Bedtime Take with 40 mg tab for total daily dose of 60 mg.  9/16/2021 at Unknown time     rosuvastatin (CRESTOR) 40 MG tablet Take 40 mg by mouth At Bedtime Take with 20 mg for total daily dose of 60 mg.  9/16/2021 at pm     senna (SENNA-TIME) 8.6 MG tablet Take 1-3 tablets by mouth daily as needed   9/16/2021 at Unknown time     SUMAtriptan (IMITREX) 50 MG tablet Take 50 mg by mouth at onset of headache   Past Week at Unknown time     traZODone (DESYREL) 100 MG tablet Take 400 mg by mouth At Bedtime   9/16/2021 at pm; verifed she is taking 400mg nightly     valACYclovir (VALTREX) 1000 mg tablet Take 1,000 mg by mouth 3 times daily For 7 days for Shingles       zonisamide (ZONEGRAN) 25 MG capsule Take 50 mg by mouth daily   9/17/2021 at am       Information source(s): Patient, Clinic records and [facility]  Method of interview communication: in-person    Summary of Changes to PTA Med List  New: carvedilol, butalbitol-APAP  Discontinued: amlodipine-patient states not taking any more (only lisinopril/carvedilol), levothyroxine (duplicate),   Changed: furosemide to 40mg AM, lisinopril to 40mg at bedtime, included total dose of rosuvastatin is 60 mg daily, senna to prn, trazodone to 400mg at bedtime, zonisamide to 2 caps every day, lamotrigine to 200mg bid,     Patient was asked about OTC/herbal products  specifically.  PTA med list reflects this.    In the past week, patient estimated taking medication this percent of the time:  greater than 90%.    Allergies were reviewed, assessed, and updated with the patient.      Patient did not bring any medications to the hospital and can't retrieve from home. No multi-dose medications are available for use during hospital stay.     The information provided in this note is only as accurate as the sources available at the time of the update(s).    Thank you for the opportunity to participate in the care of this patient.    Alejandro Merino [facility]  9/17/2021 11:03 PM
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
## 2021-09-18 NOTE — PROGRESS NOTES
SLUMS  Scoring If High School Educated If Less than High School Educated   Normal 27-30 25-30   Mild Neurocognitive Disorder 21-26 20-24   Dementia 1-20 1-19   The SLUMS is a cognitive screening assessment used to identify the presence of cognitive deficits, and/or to identify a change in cognition over time.      Patient Score: 20/30    Score Interpretation: Score of this screen indicates significant cognitive impairment at this time. Recommend 24 hour supervision for safety with all ADLs including but not limited to medication management, finances and driving.    Pt demonstrated challenges with memory, problem solving and math calculations. Pt would benefit from further cognitive assessment. Will continue to assess in this setting and recommend continued testing at next level of care.        9/18/2021 by @[facility]@

## 2021-09-18 NOTE — PROGRESS NOTES
09/18/21 0945   Quick Adds   Quick Adds Certification   Living Environment   People in home alone   Current Living Arrangements condominium   Living Environment Comments tub shower, grab bars (pt bathes in the tub), RTS   Self-Care   Usual Activity Tolerance moderate   Current Activity Tolerance fair  (pt reports needing to rest much more, taking 2 naps a day)   Instrumental Activities of Daily Living (IADL)   Previous Responsibilities meal prep;housekeeping;laundry;medication management;finances;driving   IADL Comments outpatient PT for Reflex Sympathetic Dystrophy  (2 daughters though limited support)   Disability/Function   Hearing Difficulty or Deaf yes   Describe hearing loss   (Wears hearing aids)   Difficulty Communicating yes   Communication other (see comments)  (pt word finding difficulty at times, or unexpected words)   Change in Functional Status Since Onset of Current Illness/Injury   (does not feel like herself, not thinking right, tired)   General Information   Onset of Illness/Injury or Date of Surgery 09/17/21   Referring Physician Trinity Padilla MD   Patient/Family Therapy Goal Statement (OT) I just want to feel like myself and get better   Cognitive Status Examination   Orientation Status orientation to person, place and time   Follows Commands follows one-step commands;follows two-step commands;repetition of directions required   Safety Deficit judgment;problem-solving   Cognitive Status Comments pt reports memory difficulty, can't find her keys/'s licensce, demonstrates difficulty w/problem solving, aware of challenges as daughters have been expressing to her. Open to feedback about it. Conversation out of context at times, able to redirect w/cues.   Visual Perception   Impact of Vision Impairment on Function (Vision) Pt reports vision impairment since fall last winter, vision not assessed, though noted increased blinking and prefers lights low. Wears glasses most of the time   Pain  Assessment   Patient Currently in Pain No   Strength Comprehensive (MMT)   Comment, General Manual Muscle Testing (MMT) Assessment Grossly WFL   Coordination   Upper Extremity Coordination No deficits were identified   Bed Mobility   Bed Mobility supine-sit;sit-supine   Supine-Sit [place] (Bed Mobility) supervision   Sit-Supine [place] (Bed Mobility) supervision   Transfers   Transfers sit-stand transfer   Sit-Stand Transfer   Sit-Stand [place] (Transfers) supervision;contact guard   Sit/Stand Transfer Comments no device given   Clinical Impression   Criteria for Skilled Therapeutic Interventions Met (OT) yes   OT Diagnosis Impaired ADLs   OT Problem List-Impairments impacting ADL balance;cognition   Assessment of Occupational Performance 1-3 Performance Deficits   Planned Therapy Interventions (OT) ADL retraining;cognition   Clinical Decision Making Complexity (OT) moderate complexity   Therapy Frequency (OT) Daily   Predicted Duration of Therapy   (4)   Risk & Benefits of therapy have been explained patient   OT Discharge Planning    OT Discharge Recommendation (DC Rec)   (Rec 24 hour sup upon dc, med management, driving, OT at DC)   OT Rationale for DC Rec pt is demonstrating safety concens w/problem solving, memory and needs supervision w/ADLs   Total Evaluation Time (Minutes)   Total Evaluation Time (Minutes) 10

## 2021-09-18 NOTE — PLAN OF CARE
Problem: Adult Inpatient Plan of Care  Goal: Plan of Care Review  Outcome: Improving   No acute changes this shift. Neuro checks WNL. OT/PT by today. Reports her chronic pain. Home medications given as scheduled. Up ambulating with gait belt, walker, and stand by assistance.

## 2021-09-18 NOTE — PLAN OF CARE
"Pt arrived on the floor overnight. Orthostatic BPs performed.   Arrived to the unit with two fentanyl patches on. Patient stated that she \"forgot to remove the old one this morning\". The older one was removed and disposed of.  Alert and oriented by four. Has an abrasion on her scalp from a past fall. On IVF 50 ml per hour. Assist of one for ambulation. VSS.     Problem: Fall Injury Risk  Goal: Absence of Fall and Fall-Related Injury  Outcome: Improving  Intervention: Promote Injury-Free Environment  Recent Flowsheet Documentation  Taken 9/17/2021 0831 by Fernando Corona, RN  Safety Promotion/Fall Prevention:    activity supervised    bed alarm on    room near nurse's station  "

## 2021-09-18 NOTE — CONSULTS
Care Management Initial Consult    General Information  Assessment completed with: Patient,    Type of CM/SW Visit: Initial Assessment    Primary Care Provider verified and updated as needed: Yes   Readmission within the last 30 days:        Reason for Consult: discharge planning  Advance Care Planning:            Communication Assessment  Patient's communication style: spoken language (English or Bilingual)    Hearing Difficulty or Deaf: yes   Wear Glasses or Blind: no    Cognitive  Cognitive/Neuro/Behavioral: WDL                      Living Environment:   People in home: alone     Current living Arrangements: condominium      Able to return to prior arrangements: yes       Family/Social Support:  Care provided by: self  Provides care for: no one  Marital Status:    (Friends)          Description of Support System: Supportive    Support Assessment: Adequate family and caregiver support    Current Resources:   Patient receiving home care services: No     Community Resources: None  Equipment currently used at home: none  Supplies currently used at home: None    Employment/Financial:  Employment Status: retired        Financial Concerns:             Lifestyle & Psychosocial Needs:  Social Determinants of Health     Tobacco Use: Medium Risk     Smoking Tobacco Use: Former Smoker     Smokeless Tobacco Use: Never Used   Alcohol Use: Unknown     Frequency of Alcohol Consumption: Monthly or less     Average Number of Drinks: Not asked     Frequency of Binge Drinking: Not asked   Financial Resource Strain:      Difficulty of Paying Living Expenses:    Food Insecurity:      Worried About Running Out of Food in the Last Year:      Ran Out of Food in the Last Year:    Transportation Needs:      Lack of Transportation (Medical):      Lack of Transportation (Non-Medical):    Physical Activity:      Days of Exercise per Week:      Minutes of Exercise per Session:    Stress:      Feeling of Stress :    Social Connections:       Frequency of Communication with Friends and Family:      Frequency of Social Gatherings with Friends and Family:      Attends Catholic Services:      Active Member of Clubs or Organizations:      Attends Club or Organization Meetings:      Marital Status:    Intimate Partner Violence:      Fear of Current or Ex-Partner:      Emotionally Abused:      Physically Abused:      Sexually Abused:    Depression: Not at risk     PHQ-2 Score: 2   Housing Stability:      Unable to Pay for Housing in the Last Year:      Number of Places Lived in the Last Year:      Unstable Housing in the Last Year:        Functional Status:  Prior to admission patient needed assistance:   Dependent ADLs:: Independent  Dependent IADLs:: Independent  Assesssment of Functional Status: At functional baseline    Mental Health Status:          Chemical Dependency Status:                Values/Beliefs:  Spiritual, Cultural Beliefs, Catholic Practices, Values that affect care:                 Additional Information:  AIDET completed. Pt lives alone in a Condo. She is independent with all ADL's, has no services and no DME used. MOON and Observation status given and explained, pt  verbalized understanding.  Anticipate pt will DC back home without needs. Friend will transport upon DC. Informed that CM will follow progression of care.   DERW Hopson      Abbreviation Code:  [facility] Home care (HC), Patient (Pt), Transitional Care Unit (TCU), Skilled Nursing Facility (SNF), Assisted Living (AL), Independent Living (IL), Physical Therapy (PT), Occupational Therapy (OT)        Francheska Goddard RN

## 2021-09-18 NOTE — PROGRESS NOTES
"[facility] Medicine PROGRESS NOTE      Identification/Summary: Sandy Spencer is a 81 year old female with past medical history of chronic pain, opiod dependence, HTN, depression, anxiety who was admitted on 9/17/2021 for multiple falls, gait imbalance. Hospital course is notable for MRI brain, CT head no acute changes. She scored 20/30 on slums, concern for safety with med management, driving etc. PT eval pending.     Assessment and Plan:  History of multiple falls  Gait  Imbalance  Cognitive impairment  Likely polypharmacy/ Medication related with possible mis management of meds  PT to see  carotid ultrasound no significant stenosis  MRI brain, CT head, cervical spine no acute changes  No sign of infection.   will have psychiatry and pain team consult to review polypharmacy.    History of multiple venous thromboembolism episodes  On Eliquis     Hypertension  Depression anxiety  Insomnia  CKD stage III  Solitary kidney   chronic pain  DVT prophylaxis: Eliquis  Code: Full confirmed with patient     Diet: Combination Diet Regular Diet Adult  Code Status: Full Code    Anticipated possible discharge in 1 days once PT eval, safe discharge plan milestones are met.    Interval History/Subjective:  She denies any dizziness today, no shortness of breath, no chest pain. No other urinary complaints. Patient apparently had 2 fentanyl patches on yesterday, she reported she forgot to take one off.     Physical Exam/Objective:  Vitals I/O   Vital signs:  Temp: 98.3  F (36.8  C) Temp src: Oral BP: (!) 147/68 Pulse: 72   Resp: 18 SpO2: 97 % O2 Device: None (Room air)   Height: 160 cm (5' 3\") Weight: 69.1 kg (152 lb 6.4 oz)  Estimated body mass index is 27 kg/m  as calculated from the following:    Height as of this encounter: 1.6 m (5' 3\").    Weight as of this encounter: 69.1 kg (152 lb 6.4 oz).     I/O last 3 completed shifts:  In: 500 [IV Piggyback:500]  Out: 200 [Urine:200]     Body mass index is 27 kg/m .    General " Appearance:  Alert, cooperative, no distress   Head:  Normocephalic, atraumatic   Eyes:  PERRL    Throat:  mucosa; moist   Neck: No JVD, thyromegaly   Lungs:   Clear to auscultation bilaterally, respirations unlabored   Chest Wall:  No tenderness or deformity   Heart:  Regular rate and rhythm, S1, S2 normal,no murmur   Abdomen:   Soft, non tender, non distended, bowel sounds present, no guarding or rigidity   Extremities: No edema, no joint swelling   Skin: Skin color, texture, turgor normal, no rashes or lesions   Neurologic: Alert and oriented X 3, Moves all 4 extremities       Medications:   Personally Reviewed.    apixaban ANTICOAGULANT  5 mg Oral BID     azelastine  2 spray Both Nostrils BID     busPIRone  5 mg Oral At Bedtime     carvedilol  12.5 mg Oral BID w/meals     fentaNYL  75 mcg Transdermal Q48H     fentaNYL   Transdermal Q8H     furosemide  40 mg Oral Daily     lamoTRIgine  200 mg Oral BID     levothyroxine  50 mcg Oral Daily     lisinopril  40 mg Oral At Bedtime     nortriptyline  75 mg Oral At Bedtime     rosuvastatin  20 mg Oral At Bedtime     rosuvastatin  40 mg Oral At Bedtime     sodium chloride (PF)  3 mL Intracatheter Q8H     zonisamide  50 mg Oral Daily       Data reviewed today: I personally reviewed all new medications, labs, imaging/diagnostics reports over the past 24 hours. Pertinent findings include    Labs:  Most Recent 3 CBC's:Recent Labs   Lab Test 09/18/21  0708 09/17/21  1527 05/21/21  1512 03/15/21  1631 03/15/21  1631   WBC 3.5* 4.7  --   --  5.6   HGB 10.0* 11.5* 12.3   < > 11.9*   MCV 97 97  --   --  94   * 130*  --   --  192    < > = values in this interval not displayed.     Most Recent 3 BMP's:Recent Labs   Lab Test 09/18/21  0708 09/17/21  1527 08/13/21  1524    137 139   POTASSIUM 4.1 4.0 4.2   CHLORIDE 104 98 100   CO2 28 28 28   BUN 21 21 26   CR 1.44* 1.61* 1.51*   ANIONGAP 6 11 11   SRINIVAS 8.5 9.3 9.4   GLC 87 86 75       Imaging:   Recent Results (from the  past 24 hour(s))   MR Brain w/o Contrast    Narrative    EXAM: MR BRAIN W/O CONTRAST  LOCATION: [facility]  DATE/TIME: 9/17/2021 4:41 PM    INDICATION: Head trauma, minor (age >= 65 years). Slurred speech, left foot drop.   COMPARISON: Brain MRI 05/28/2021.  TECHNIQUE: Routine multiplanar multisequence head MRI without intravenous contrast.    FINDINGS: There is artifact related to metallic dental hardware that obscures portions of the lower anterior brain, face and orbits. No restricted diffusion is visible. Mild mucosal thickening within the ethmoid air cells. Paranasal sinuses are otherwise   free from significant disease. Patchy fluid within the bilateral mastoid air cells. Intraorbital contents are unremarkable. There is mild generalized prominence of the sulci and ventricles, consistent with underlying volume loss. Intracranial flow voids   are intact. There is no mass effect, midline shift, or extraaxial collection. There are scattered foci of T2/FLAIR hyperintensity within the cerebral white matter and central andre that are nonspecific but probably reflect the sequela of chronic small   vessel ischemic disease. No evidence for acute or chronic intracranial blood products.      Impression    IMPRESSION:  1.  Examination is degraded by artifact from motion as well as the patient's metallic dental hardware.  2.  There is no visible acute infarct, hemorrhage or other acute intracranial finding.  3.  Mild atrophy and presumed chronic small vessel ischemic changes.   Head CT w/o contrast    Narrative    EXAM: CT HEAD W/O CONTRAST  LOCATION: [facility]  DATE/TIME: 9/17/2021 6:20 PM    INDICATION: Head trauma, head injury, anticoagulated.  COMPARISON: MRI of the brain dated 09/17/2021.  TECHNIQUE: Routine CT Head without IV contrast. Multiplanar reformats. Dose reduction techniques were used.    FINDINGS:  INTRACRANIAL CONTENTS: No intracranial hemorrhage,  extraaxial collection, or mass effect.  No CT evidence of acute infarct. Mild presumed chronic small vessel ischemic changes. Mild generalized volume loss. No hydrocephalus. Scattered intracranial   atherosclerotic calcifications.    VISUALIZED ORBITS/SINUSES/MASTOIDS: Prior bilateral cataract surgery. Visualized portions of the orbits are otherwise unremarkable. No paranasal sinus mucosal disease. No middle ear or mastoid effusion.    BONES/SOFT TISSUES: No acute abnormality. Advanced left and mild to moderate right temporomandibular joint degenerative changes.      Impression    IMPRESSION:  1.  No CT evidence for acute intracranial process.  2.  Brain atrophy and presumed chronic microvascular ischemic changes as above.   Cervical spine CT w/o contrast    Narrative    EXAM: CT CERVICAL SPINE W/O CONTRAST  LOCATION: [facility]  DATE/TIME: 9/17/2021 6:20 PM    INDICATION: Fall, neck injury, trauma.  COMPARISON: 03/12/2021.  TECHNIQUE: Routine CT Cervical Spine without IV contrast. Multiplanar reformats. Dose reduction techniques were used.    FINDINGS:  VERTEBRA: Normal vertebral body heights. Straightening of the normal cervical lordosis, as before. Trace retrolisthesis of C5 on C6, as before. No fracture or posttraumatic subluxation.     CANAL/FORAMINA: Multilevel degenerative disc disease and uncovertebral and facet arthropathy. Degenerative arthropathy at the median atlantoaxial joint. Moderate/moderate to severe disc space narrowing at C5-C6 and C6-C7, as before. No definite   high-grade spinal canal stenosis. Moderate neural foraminal stenosis on the left at C4-C5, C5-C6 and C6-C7 and on the right, there appears to be moderate to severe neural foraminal stenosis at C5-C6 based on prominent uncovertebral osteophytes. This is   unchanged.     PARASPINAL: Right greater than left carotid bifurcation atherosclerotic calcifications.      Impression    IMPRESSION:  1.  No fracture or  posttraumatic subluxation.  2.  No high-grade spinal canal stenosis.  3.  Multilevel degenerative changes, as described.   US Carotid Bilateral    Narrative    EXAM: US CAROTID BILATERAL  LOCATION: [facility]  DATE/TIME: 9/18/2021 12:22 PM    INDICATION: Gait instability and falls.  COMPARISON: None.  TECHNIQUE: Duplex exam performed utilizing 2D gray-scale imaging, Doppler interrogation with color-flow and spectral waveform analysis. The percent diameter stenosis is determined using NASCET criteria and Society of Radiologists in Ultrasound Consensus   Criteria.    FINDINGS:    RIGHT: Mild plaque at the bifurcation. The peak systolic velocity in the ICA is less than 125 cm/sec, consistent with less than 50% stenosis. Normal velocities in the ECA. Antegrade flow within the vertebral artery.     LEFT: Mild plaque at the bifurcation. The peak systolic velocity in the ICA is less than 125 cm/sec, consistent with less than 50% stenosis. Normal velocities in the ECA. Antegrade flow within the vertebral artery.    VELOCITY CHART:  CCA   Right: 26/12 cm/s   Left: 70/16 cm/s  ICA   Right: 114/33 cm/s   Left: 88/26 cm/s  ECA   Right: 236/15 cm/s   Left: 101/0 cm/s  ICA/CCA PSV Ratio   Right: 2.0   Left: 1.3      Impression    IMPRESSION:  1.  Mild plaque formation, velocities consistent with less than 50% stenosis in the right internal carotid artery.  2.  Mild plaque formation, velocities consistent with less than 50% stenosis in the left internal carotid artery.  3.  Flow within the vertebral arteries is antegrade.       EKG: Personally reviewed.    Trinity Padilla MD  Hospitalist  [facility]

## 2021-09-19 ENCOUNTER — APPOINTMENT (OUTPATIENT)
Dept: PHYSICAL THERAPY | Facility: CLINIC | Age: 81
DRG: 918 | End: 2021-09-19
Payer: MEDICARE

## 2021-09-19 ENCOUNTER — APPOINTMENT (OUTPATIENT)
Dept: OCCUPATIONAL THERAPY | Facility: CLINIC | Age: 81
DRG: 918 | End: 2021-09-19
Payer: MEDICARE

## 2021-09-19 VITALS
HEART RATE: 73 BPM | DIASTOLIC BLOOD PRESSURE: 75 MMHG | OXYGEN SATURATION: 94 % | SYSTOLIC BLOOD PRESSURE: 171 MMHG | WEIGHT: 149 LBS | HEIGHT: 63 IN | BODY MASS INDEX: 26.4 KG/M2 | RESPIRATION RATE: 18 BRPM | TEMPERATURE: 98.1 F

## 2021-09-19 PROCEDURE — 250N000013 HC RX MED GY IP 250 OP 250 PS 637: Performed by: INTERNAL MEDICINE

## 2021-09-19 PROCEDURE — 99239 HOSP IP/OBS DSCHRG MGMT >30: CPT | Performed by: INTERNAL MEDICINE

## 2021-09-19 PROCEDURE — 97110 THERAPEUTIC EXERCISES: CPT | Mod: GP

## 2021-09-19 PROCEDURE — 97535 SELF CARE MNGMENT TRAINING: CPT | Mod: GO

## 2021-09-19 PROCEDURE — 250N000013 HC RX MED GY IP 250 OP 250 PS 637: Performed by: CLINICAL NURSE SPECIALIST

## 2021-09-19 PROCEDURE — 97116 GAIT TRAINING THERAPY: CPT | Mod: GP

## 2021-09-19 RX ORDER — FENTANYL 50 UG/1
50 PATCH TRANSDERMAL
Status: DISCONTINUED | OUTPATIENT
Start: 2021-09-19 | End: 2021-09-19 | Stop reason: HOSPADM

## 2021-09-19 RX ORDER — AMOXICILLIN 250 MG
1 CAPSULE ORAL 2 TIMES DAILY
Status: DISCONTINUED | OUTPATIENT
Start: 2021-09-19 | End: 2021-09-19 | Stop reason: HOSPADM

## 2021-09-19 RX ORDER — FENTANYL 12.5 UG/1
12 PATCH TRANSDERMAL
Status: DISCONTINUED | OUTPATIENT
Start: 2021-09-19 | End: 2021-09-19 | Stop reason: HOSPADM

## 2021-09-19 RX ADMIN — LAMOTRIGINE 200 MG: 100 TABLET ORAL at 08:55

## 2021-09-19 RX ADMIN — FUROSEMIDE 40 MG: 40 TABLET ORAL at 08:55

## 2021-09-19 RX ADMIN — FENTANYL 1 PATCH: 12 PATCH, EXTENDED RELEASE TRANSDERMAL at 10:50

## 2021-09-19 RX ADMIN — ACETAMINOPHEN 650 MG: 325 TABLET ORAL at 08:56

## 2021-09-19 RX ADMIN — APIXABAN 5 MG: 5 TABLET, FILM COATED ORAL at 08:55

## 2021-09-19 RX ADMIN — FENTANYL 1 PATCH: 50 PATCH TRANSDERMAL at 10:50

## 2021-09-19 RX ADMIN — LEVOTHYROXINE SODIUM 50 MCG: 0.05 TABLET ORAL at 05:49

## 2021-09-19 RX ADMIN — BUTALBITAL, ACETAMINOPHEN AND CAFFEINE 1 TABLET: 50; 325; 40 TABLET ORAL at 08:56

## 2021-09-19 RX ADMIN — ZONISAMIDE 50 MG: 25 CAPSULE ORAL at 08:57

## 2021-09-19 RX ADMIN — AZELASTINE HYDROCHLORIDE 2 SPRAY: 137 SPRAY, METERED NASAL at 08:53

## 2021-09-19 RX ADMIN — CARVEDILOL 12.5 MG: 12.5 TABLET, FILM COATED ORAL at 08:56

## 2021-09-19 RX ADMIN — DOCUSATE SODIUM 50MG AND SENNOSIDES 8.6MG 1 TABLET: 8.6; 5 TABLET, FILM COATED ORAL at 12:15

## 2021-09-19 ASSESSMENT — MIFFLIN-ST. JEOR: SCORE: 1119.99

## 2021-09-19 NOTE — PLAN OF CARE
Occupational Therapy Discharge Summary    Reason for therapy discharge:    Discharged to home with home therapy.    Progress towards therapy goal(s). See goals on Care Plan in [facility] electronic health record for goal details.  Goals partially met.  Barriers to achieving goals:   discharge from facility.    Therapy recommendation(s):    Continued therapy is recommended.  Rationale/Recommendations:  establish baseline cognitive performance.

## 2021-09-19 NOTE — PLAN OF CARE
Patient c/o headache with generalized pain this evening.  PRN Tylenol was given with relief.  Patient requesting Trazodone for sleep but explained that is was discontinued yesterday.    2225 patient requested Butalbital for headache.      Problem: Adult Inpatient Plan of Care  Goal: Optimal Comfort and Wellbeing  Outcome: Improving     Problem: Fall Injury Risk  Goal: Absence of Fall and Fall-Related Injury  Outcome: Improving  Intervention: Promote Injury-Free Environment  Recent Flowsheet Documentation  Taken 9/18/2021 1809 by Edna Dooley RN  Safety Promotion/Fall Prevention:    bed alarm on    fall prevention program maintained    clutter free environment maintained    increase visualization of patient    increased rounding and observation    lighting adjusted    mobility aid in reach    nonskid shoes/slippers when out of bed    patient and family education    room door open    room near nurse's station    safety round/check completed     Problem: Adult Inpatient Plan of Care  Goal: Optimal Comfort and Wellbeing  Outcome: Improving

## 2021-09-19 NOTE — PROGRESS NOTES
Care Management Discharge Note    Discharge Date: 09/19/2021       Discharge Disposition: Home Care    Discharge Services: None    Discharge DME: None    Discharge Transportation: family or friend will provide    Private pay costs discussed: Not applicable    PAS Confirmation Code:    Patient/family educated on Medicare website which has current facility and service quality ratings: no    Education Provided on the Discharge Plan:  yes  Persons Notified of Discharge Plans: patient, home care agency, daughter  Patient/Family in Agreement with the Plan: yes    Handoff Referral Completed: No    Additional Information:  Per notes, at baseline, the patient lives in a condo alone, and she is independent and does not receive any services. PT is recommending the patient return home with home physical therapy, and OT is recommending the patient have help with medication management, 24 hour supervision, and help with driving at discharge. A referral was made, and the patient was accepted for cares with [facility]. The patient has used them previously and wanted to stay with them. Orders will be sent for RN, PT, and OT at discharge. The patient stated she has a lot of friends who work in the medical field, and her daughter is supportive and able to assist. The patient will discharge to home with home cares, and a friend will provide transportation.    Wilmer Reyna RN

## 2021-09-19 NOTE — PLAN OF CARE
Problem: Risk for Delirium  Goal: Improved Sleep  Outcome: Improving   Patient very drowsy/lethargic upon initial assessment, patient woke shortly after and called on the call light and was alert and oriented. Patient resting and sleeping between cares and assessments.   Problem: Adult Inpatient Plan of Care  Goal: Plan of Care Review  Outcome: Improving   Plan for PT eval and treat 9/19/2021.  Caroline Foy RN  9/19/2021

## 2021-09-19 NOTE — PLAN OF CARE
Problem: Adult Inpatient Plan of Care  Goal: Readiness for Transition of Care  Outcome: Adequate for Discharge     Discharge orders placed by Dr. Padilla this afternoon around 1250. AVS reviewed with Sandy who verbalized understanding. I reviewed her medications with her, I highlighted when the next doses are due. Her friend Olamide will help her with her med set-up until the home care RN is able to visit. She will also have home PT and OT. Follow-up visits reviewed. I explained no driving which she agreed to. IV discontinued. I also reinforced that we put a lower dose of fentanyl patch on today which was actually two separate patches. I explained and wrote in her discharge paperwork that when she goes to change her patch next, she will need to remove BOTH of the patches prior to applying her new patch.     Of note, I spoke with Dr. Padilla regarding her discharging dose of fentanyl patch as on her discharge paperwork, it is still her usual 75 mcg/hr patch and not the new 62 mcg/hr patch that she is currently wearing. Per Valerie - she is to continue her usual dose and her and her pain doctor can address decreasing the dose. I explained this to Sandy and she verbalized understanding.    Left unit around 1415 via wheelchair. Friend Gentry providing transport home.

## 2021-09-19 NOTE — DISCHARGE SUMMARY
[facility] MEDICINE  DISCHARGE SUMMARY     Primary Care Physician: Caitlyn Rees  Admission Date: 9/17/2021   Discharge Provider: Trinity Padilla MD Discharge Date: 9/19/2021   Diet: Orders Placed This Encounter      Combination Diet Regular Diet Adult      Diet      Code Status: Full Code   Activity: activity as tolerated   [facility]      Condition at Discharge: Stable      REASON FOR PRESENTATION(See Admission Note for Details)   Falls    PRINCIPAL & ACTIVE DISCHARGE DIAGNOSES     Active Problems:  Multiple falls, gait imbalance likely related to polypharmacy  Mild cognitive impairment slums 20 out of 30  History of reflex sympathetic dystrophy/chronic pain, opioid dependence  Bilateral carotid stenosis less than 50% follow-up as outpatient  History of multiple venous thromboembolism episodes on Eliquis  Hypertension  Depression anxiety  CKD stage III  Solitary kidney      SIGNIFICANT FINDINGS (Imaging, labs):   MRI brain  1.  Examination is degraded by artifact from motion as well as the patient's metallic dental hardware.   2.  There is no visible acute infarct, hemorrhage or other acute intracranial finding.   3.  Mild atrophy and presumed chronic small vessel ischemic changes.    Carotid ultrasound  IMPRESSION:   1.  Moderate plaque formation, velocities consistent with less than 50% stenosis in the right internal carotid artery.   2.  Mild plaque formation, velocities consistent with less than 50% stenosis in the left internal carotid artery.   3.  Flow within the vertebral arteries is antegrade.    CT head without contrast  IMPRESSION:   1.  No CT evidence for acute intracranial process.   2.  Brain atrophy and presumed chronic microvascular ischemic changes as above.    Creatinine 1.4    PENDING LABS         PROCEDURES ( this hospitalization only)          RECOMMENDATION FOR F/U VISIT       DISPOSITION     Home    SUMMARY OF HOSPITAL COURSE:    81 year old old female  past medical history significant for hypertension hypothyroidism, CKD stage III, ADD, chronic pain, Opiate dependence, depression, solitary kidney, status post right nephroureterectomy, PE, multiple venous thromboembolism on Eliquis status post IVC filter presented to emergency room with complaints of gait instability, recent falls. She had multiple falls over the last 2 months, and one occasion hitting her head.  MRI, CT head, no acute changes.  Carotid ultrasound less than 50% bilateral stenosis, needs to be followed up as outpatient.  Occupational Therapy did cognitive assessment and she scored 20 out of 30.  Recommending medication management, no driving until further rest facial of assessment.  Daughter, patient was informed.  She is on multiple psychiatric medications and fentanyl patch, apparently on day of admission, she had 2 patches on and reported that she forgot to take off old one.  She follows up closely with Dr. Lynn at the pain clinic and would recommend weaning her off as tolerated.  She will also follow-up with psychiatry and primary closely  for other psychiatric medication changes.  She is discharged home with home care.    Discharge Medications with Med changes:        Review of your medicines      CONTINUE these medicines which have NOT CHANGED      Dose / Directions   apixaban ANTICOAGULANT 5 MG tablet  Commonly known as: ELIQUIS  Used for: Other chronic pulmonary embolism without acute cor pulmonale (H)      Dose: 5 mg  Take 1 tablet (5 mg) by mouth 2 times daily  Quantity: 180 tablet  Refills: 3     azelastine 0.15 % nasal spray  Commonly known as: ASTEPRO      Dose: 2 spray  Spray 2 sprays into both nostrils 2 times daily  Refills: 0     busPIRone 10 MG tablet  Commonly known as: BUSPAR  Used for: Anxiety disorder due to medical condition      One-half tab bedtime  Quantity: 30 tablet  Refills: 1     Butalbital-Acetaminophen  MG Tabs per tablet  Commonly known as: PHRENILIN       Dose: 1 tablet  Take 1 tablet by mouth every 4 hours as needed for headaches  Refills: 0     carvedilol 12.5 MG tablet  Commonly known as: COREG      Dose: 12.5 mg  Take 12.5 mg by mouth 2 times daily (with meals)  Refills: 0     CoQ10 200 MG Caps  Used for: Severe episode of recurrent major depressive disorder, without psychotic features (H)      Dose: 1 capsule  Take 1 capsule by mouth daily  Quantity: 30 capsule  Refills: 1     fentaNYL 75 mcg/hr 72 hr patch  Commonly known as: DURAGESIC  Used for: Lumbar radiculopathy      Dose: 1 patch  Place 1 patch onto the skin every 48 hours remove old patch.  Quantity: 15 patch  Refills: 0     furosemide 20 MG tablet  Commonly known as: LASIX      Dose: 40 mg  Take 40 mg by mouth daily  Refills: 0     lamoTRIgine 100 MG tablet  Commonly known as: LaMICtal      Dose: 200 mg  Take 200 mg by mouth 2 times daily  Refills: 0     levothyroxine 50 MCG tablet  Commonly known as: SYNTHROID/LEVOTHROID      Dose: 50 mcg  Take 50 mcg by mouth  Refills: 0     lisinopril 40 MG tablet  Commonly known as: ZESTRIL      Dose: 40 mg  Take 40 mg by mouth At Bedtime  Refills: 0     naloxone 4 MG/0.1ML nasal spray  Commonly known as: NARCAN  Used for: Lumbar radiculopathy      Dose: 4 mg  Spray 1 spray (4 mg) into one nostril alternating nostrils once as needed for opioid reversal every 2-3 minutes until assistance arrives  Quantity: 0.2 mL  Refills: 0     nortriptyline 75 MG capsule  Commonly known as: PAMELOR  Used for: Insomnia, unspecified type      Dose: 75 mg  Take 1 capsule (75 mg) by mouth At Bedtime  Quantity: 30 capsule  Refills: 1     Praluent 75 MG/ML injectable pen  Generic drug: alirocumab      INJECT 75MG UNDER THE SKIN EVERY 14 DAYS  Refills: 0     * rosuvastatin 20 MG tablet  Commonly known as: CRESTOR      Dose: 20 mg  Take 20 mg by mouth At Bedtime Take with 40 mg tab for total daily dose of 60 mg.  Refills: 0     * rosuvastatin 40 MG tablet  Commonly known as: CRESTOR       Dose: 40 mg  Take 40 mg by mouth At Bedtime Take with 20 mg for total daily dose of 60 mg.  Refills: 0     Senna-Time 8.6 MG tablet  Generic drug: senna      Dose: 1-3 tablet  Take 1-3 tablets by mouth daily as needed  Refills: 0     SUMAtriptan 50 MG tablet  Commonly known as: IMITREX      Dose: 50 mg  Take 50 mg by mouth at onset of headache  Refills: 0     traZODone 100 MG tablet  Commonly known as: DESYREL      Dose: 400 mg  Take 400 mg by mouth At Bedtime  Refills: 0     zonisamide 25 MG capsule  Commonly known as: ZONEGRAN      Dose: 50 mg  Take 50 mg by mouth daily  Refills: 0         * This list has 2 medication(s) that are the same as other medications prescribed for you. Read the directions carefully, and ask your doctor or other care provider to review them with you.            STOP taking    valACYclovir 1000 mg tablet  Commonly known as: VALTREX                    Rationale for medication changes:            Consults       Immunizations given this encounter         Discharge Procedure Orders   Home care nursing referral   Referral Priority: Routine Referral Type: Home Health Therapies & Aides   Number of Visits Requested: 1     Reason for your hospital stay   Order Comments: Falls     Follow-up and recommended labs and tests   Order Comments: Follow up with primary care provider, Caitlyn Rees, within 3-5 days for hospital follow- up.   Follow up with pain clinic, psychiatry in 1 week     MD face to face encounter   Order Comments: Documentation of Face to Face and Certification for Home Health Services    I certify that patient: Sandy Spencer is under my care and that I, or a nurse practitioner or physician's assistant working with me, had a face-to-face encounter that meets the physician face-to-face encounter requirements with this patient on: 9/19/2021.    This encounter with the patient was in whole, or in part, for the following medical condition, which is the primary reason for home health  "care: falls, gait imbalance, polypharmacy, chronic pain.    I certify that, based on my findings, the following services are medically necessary home health services: Nursing.    My clinical findings support the need for the above services because: Nurse is needed: To assess changes in medications or other medical regimen..    Further, I certify that my clinical findings support that this patient is homebound (i.e. absences from home require considerable and taxing effort and are for medical reasons or Baptism services or infrequently or of short duration when for other reasons) because: of cognitive impairment.    Based on the above findings. I certify that this patient is confined to the home and needs intermittent skilled nursing care, physical therapy and/or speech therapy.  The patient is under my care, and I have initiated the establishment of the plan of care.  This patient will be followed by a physician who will periodically review the plan of care.  Physician/Provider to provide follow up care: Caitlyn Rees    Attending hospital physician (the Medicare certified [place] provider): Trinity Padilla MD  Physician Signature: See electronic signature associated with these discharge orders.  Date: 9/19/2021     Activity   Order Comments: Your activity upon discharge: activity as tolerated, no driving until further assessment     Order Specific Question Answer Comments   Is discharge order? Yes      Diet   Order Comments: Follow this diet upon discharge: Orders Placed This Encounter      Combination Diet Regular Diet Adult       Order Specific Question Answer Comments   Is discharge order? Yes      Examination     Vital Signs in last 24 hours:   Vital signs:  Temp: 98.1  F (36.7  C) Temp src: Oral BP: (!) 171/75 (Rn  notified) Pulse: 73   Resp: 18 SpO2: 94 % O2 Device: None (Room air)   Height: 160 cm (5' 3\") Weight: 67.6 kg (149 lb)  Estimated body mass index is 26.39 kg/m  as calculated from the " "following:    Height as of this encounter: 1.6 m (5' 3\").    Weight as of this encounter: 67.6 kg (149 lb).       Pertinent positives on exam:  General: Awake alert oriented x3 not in any apparent distress  Head: Abrasion from previous fall over the scalp  Heart S1-S2 heard regular rate and rhythm  Lungs: Clear to auscultation bilaterally    Please see EMR for more detailed significant labs, imaging, consultant notes etc.  Total time spent on discharge: > 35 minutes    Trinity Padilla MD   [facility] Hospitalist Service: Ph:[phone]    CC:Caitlyn Rees      "

## 2021-09-19 NOTE — PROGRESS NOTES
"[facility] ACUTE PAIN SERVICE    ([facility], [facility], [facility])   Consult Note    Date of Admission:  9/17/2021  Date of Consult: 09/19/21  Physician requesting consult: Dr. Mcpherson   Reason for consult: chronic pain, falls, polypharmacy     Assessment/Plan:     Sandy Spencer is a 81 year old female who was admitted on 9/17/2021.   . I was asked to see the patient for chronic pain, falls, polypharmacy. Admitted for falls. History of chronic pain, opiod dependence, CKD, HTN, depression, anxiety who was admitted on 9/17/2021 for multiple falls, gait imbalance. Hospital course is notable for MRI brain, CT head no acute changes.     Does not think that fentanyl patch helps at all. Has before discussed with Dr. Lynn of tapering off fent patch since 'it doesn't help anyway', but \"edvin had a lot of tragedy in the past year, so haven't been able to focus on coming off of pain medication.\" Describes son's death, and becomes tearful. She does NOT think that fentanyl patch is contributing at all to any side effects that could be contributing to falls (I disagree). She thinks that her headaches are the reason she falls. She does see PT as outpatient, which has been extremely helpful.  Introduced option of getting referral to headache / migraine practitioner, she has not ever considered this.     Does agree to try lowering fentanyl patch, though she does not think that it is contributing to any side effects. She cries again when she says she is 'fed up with the pain'. Is concerned about the fact that she picked up Rx for fentanyl patch 75mcg patch, 4 days ago, and \"what will I do with these patches if we change the dose? I'm too practical. I can't waste those patches now that I've purchased them.\" Discussed that this is not a good reason to not trial tapering off of fentanyl patches, and that we will cross that bridge when it comes (at discharge). She agrees.    Did receive Botox a month ago, did " nothing.   Does have an appt for [facility] clinic for tomorrow, will need to reschedule, she is aware.   Does have steroid injections that have helped with her headache and lower back pain.   Did discuss option of possibly feeling Opioid induced headache (common with Dilaudid, less common but possible with fentanyl patch), daily. She agrees that this could be a slight possibility. Will think about if she wants a referral for specialty headache clinic. Does not at this point want to try a prophylactic migraine med (which all could contribute to side effects that could continue to make her unsteady/fall).  Butalbital does help, significantly. Will continue that as we taper off Fentanyl and consider headache clinic referral.    Video-Visit Details    Type of service:  Video Visit    Video Start Time (time video started): 1000    Video End Time (time video stopped): 1020    Originating Location (home)    Distant Location (location):  [address]     Mode of Communication:  Video Conference via [employer]    Physician has received verbal consent for a Video Visit from the patient? Yes    PLAN:   1) Pain is consistent with patients home chronic pain. The patient's home MME, fentanyl patch 75mcg daily.   2)Multimodal Medication Therapy  Topical: none  NSAID'S: crcl = 29ml/min, none, 1 kidney    Muscle Relaxants: she does not think she has any spasms.  Adjuvants: acetaminophen PRN, Esgic 1 tab q4hprn headache  Antidepressants/anxiolytics:Buspar 5mg at bedtime, nortriptyline 75mg qhs  Opioids: fentanyl patch 75mcg/hr q 48 h: decrease to fentanyl patch 62mcg/hr (50 + 12), today. Orders placed.    IV Pain medication:  none  3)Non-medication interventions  Pharmacy consult- appreciate recommendations   Acupuncture consult- as available Mon and Friday     Integrative consult - called referral to [phone]   4)Constipation Prophylaxis: none, add?  5) Follow up   -Opioid prescriber has been   Shane,   -Discharge Recommendations - We recommend prescribing the following at the time of discharge: TBD          History of Present Illness (HPI):       Sandy Spencer is a 81 year old old female .  The pain is reported to be , chronic, located 'all over body'.  Current pain is rated at 3/10  The patient is with chronic pain. Per [place]  review, . The patient has a  opioid tolerance. Opioid induced side effects noted, including sedation: no, nausea: no, and constipation: denies.  Noted sleep dysfunction : no, and disease of addiction: denies .      Review of medical record/Summary of labs and care everywhere. Looked at clinic note from Dr. Lynn 9/14/21. This indicated that   Patient has had problems with falls, 6 falls. Has headaches daily. .   Past pain treatments have included pain clinic: [facility].,  Pharmacological treatments (in past) have included : as below , . Past surgeries include as below.     Last UA:none recent     [place]  pulled from system on 09/19/21. Last refill on 9/15. This indicated chronic opioid use. multiple* total number of prescribing providers noted. Most common prescriber is Nikunj Lynn MD.   Fentanyl patch 75mcg, q48h refilled monthly. Last 9/15 (for 10 day supply only).   Also Rx this year for butalbital and diazepam.     Medical History  Patient Active Problem List    Diagnosis Date Noted     Muscle weakness (generalized) 09/17/2021     Priority: Medium     Shuffling gait 09/17/2021     Priority: Medium     Falls frequently 09/17/2021     Priority: Medium     Long term current use of anticoagulant therapy 09/17/2021     Priority: Medium     Chronic pancreatitis, unspecified pancreatitis type (H) 07/15/2021     Priority: Medium     Concussion with loss of consciousness 06/15/2021     Priority: Medium     Iron deficiency anemia 12/11/2020     Priority: Medium     Primary osteoarthritis of both knees 12/03/2020     Priority: Medium     Proteinuria  10/26/2020     Priority: Medium     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     Priority: Medium     Flank pain 05/01/2020     Priority: Medium     Added automatically from request for surgery [id number]         Solitary left kidney 03/11/2020     Priority: Medium     Abdominal bloating 12/15/2019     Priority: Medium     Acquired absence of other specified parts of digestive tract 12/15/2019     Priority: Medium     Aphthous ulcer of ileum 12/15/2019     Priority: Medium     Disorder of phosphorus metabolism 12/15/2019     Priority: Medium     Edema 12/15/2019     Priority: Medium     Overflow diarrhea 12/15/2019     Priority: Medium     History of partial colectomy 12/15/2019     Priority: Medium     Moderate major depression (H) 09/03/2019     Priority: Medium     Myofascial pain 08/13/2019     Priority: Medium     Generalized muscle weakness 03/03/2019     Priority: Medium     Anxiety disorder due to medical condition      Priority: Medium     Family relationship problem      Priority: Medium     History of posttraumatic stress disorder (PTSD)      Priority: Medium     Bladder spasms      Priority: Medium     Hydronephrosis 02/13/2019     Priority: Medium     Added automatically from request for surgery [id number]         Hematuria 02/07/2019     Priority: Medium     Other chronic pulmonary embolism without acute cor pulmonale (H) 01/26/2019     Priority: Medium     Opioid type dependence, continuous (H) 01/26/2019     Priority: Medium     Chronic pain syndrome      Priority: Medium     Coronary artery disease involving native coronary artery of native heart without angina pectoris 09/25/2018     Priority: Medium     Bilateral carotid artery stenosis 07/26/2018     Priority: Medium     Dermatochalasis of left eyelid 12/08/2017     Priority: Medium     Abdominal pain, generalized 10/10/2017     Priority: Medium     Acquired hypothyroidism      Priority: Medium     Gastroesophageal reflux disease without esophagitis 05/02/2017      Priority: Medium     Snoring 04/24/2017     Priority: Medium     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     Priority: Medium     Obstruction of biliary tree 02/08/2017     Priority: Medium     Obstruction of common bile duct 02/08/2017     Priority: Medium     Elevated lipase 12/13/2016     Priority: Medium     Temporomandibular joint-pain-dysfunction syndrome (TMJ) 09/24/2016     Priority: Medium     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      Priority: Medium     Lumbar radiculopathy 08/12/2016     Priority: Medium     Complex regional pain syndrome 05/23/2016     Priority: Medium     Cluster headaches 01/14/2016     Priority: Medium     Right rotator cuff tear 11/03/2015     Priority: Medium     Insomnia 09/17/2015     Priority: Medium     Mixed hyperlipidemia 09/17/2015     Priority: Medium     Osteopenia 08/10/2015     Priority: Medium     Major depression 08/10/2015     Priority: Medium     Seeing psychiatry         Hypertension 08/10/2015     Priority: Medium     Chronic kidney disease (CKD) stage G3a/A1, moderately decreased glomerular filtration rate (GFR) between 45-59 mL/min/1.73 square meter and albuminuria creatinine ratio less than 30 mg/g 07/29/2015     Priority: Medium     Focal glomerular sclerosis 07/29/2015     Priority: Medium     RSD upper limb, right 07/29/2015     Priority: Medium     Replacement Utility updated for latest IMO load         Anemia 07/22/2015     Priority: Medium     RSD lower limb, bilateral 07/02/2015     Priority: Medium     Replacement Utility updated for latest IMO load         ADD (attention deficit disorder) 07/02/2015     Priority: Medium     Bipolar disorder, unspecified (H) 08/06/2014     Priority: Medium        Surgical History  She  has a past surgical history that includes IR IVC Filter Placement (2/14/2019); IR Venocavagram Inferior (2/23/2019); IR IVC Filter Removal (10/16/2019); Tonsillectomy (1942); carotid endarterectomy (Left, 2009); Cholecystectomy;  "Laparotomy exploratory (7/2013); Salpingoophorectomy (Left, 1969); Total Vaginal Hysterectomy (1984); Neck surgery (2010); other surgical history; Belpharoptosis Repair; appendectomy; colectomy (1978); Laparotomy exploratory; Hysterectomy; Lumbar Laminectomy (Left, 8/11/2016); Ercp W/ Sphicterotomy (01/03/2017); Bile Duct Stent Placement; Esophagoscopy, gastroscopy, duodenoscopy (EGD), combined (N/A, 12/14/2018); Picc And Midline Team Line Insertion (2/9/2019); Pr Cystourethroscopy W/Irrig & Evac Clots (N/A, 2/10/2019); IR IVC Filter Placement (2/14/2019); CYSTOSCOPY,INSERT URETERAL STENT (Right, 2/16/2019); Nephroureterectomy (Right, 2/20/2019); Ir Inferior Venacavagram (2/23/2019); Picc (2/25/2019); Eye surgery; Ir Removal Ivc Filter (10/16/2019); and Biopsy breast (Left).     Past Surgical History:   Procedure Laterality Date     APPENDECTOMY       BELPHAROPTOSIS REPAIR      bilateral     BILE DUCT STENT PLACEMENT       BIOPSY BREAST Left     benign  1970s     CAROTID ENDARTERECTOMY Left 2009     CHOLECYSTECTOMY       COLECTOMY  1978    \"subtotal\"     ERCP W/ SPHICTEROTOMY  01/03/2017    Placement of ventral pancreatic duct stent     ESOPHAGOSCOPY, GASTROSCOPY, DUODENOSCOPY (EGD), COMBINED N/A 12/14/2018    Procedure: ENDOSCOPIC ULTRASOUND;  Surgeon: Babak Merida MD;  Location: [facility] OR;  Service: Gastroenterology     EYE SURGERY      Cataract     HC CYSTOSCOPY,INSERT URETERAL STENT Right 2/16/2019    Procedure: Cystoscopy with Retrograde and right stent exchange.;  Surgeon: Jayla De Paz MD;  Location: [facility] OR;  Service: Urology     HYSTERECTOMY       IR INFERIOR VENACAVAGRAM  2/23/2019     IR IVC FILTER PLACEMENT  2/14/2019     IR IVC FILTER PLACEMENT  2/14/2019     IR IVC FILTER REMOVAL  10/16/2019     IR REMOVAL IVC FILTER  10/16/2019     IR VENOCAVAGRAM INFERIOR  2/23/2019     LAPAROTOMY EXPLORATORY  7/2013    after cholecystectomy     LAPAROTOMY EXPLORATORY      " "after cholecystectomy had surgery for \"something that was nicked\", gravely ill and in ICU for 1 month     LUMBAR LAMINECTOMY Left 8/11/2016    Procedure: LEFT L4-5 HEMILAMINECTOMY / MEDIAL FACETECTOMY & FORAMINOTOMY, RIGHT L5-S1 HEMILAMINECTOMY WITH FACETECTOMY & FORAMINOTOMY;  Surgeon: Litzy Patel MD;  Location: [facility];  Service:      NECK SURGERY  2010    neck muscle repair     NEPHROURETERECTOMY Right 2/20/2019    Procedure: ROBOTIC RIGHT NEPHROURETERECTOMY;  Surgeon: Parker Casillas MD;  Location: [facility] OR;  Service: Urology     OTHER SURGICAL HISTORY      benign breast cyst excision     PICC  2/25/2019          PICC AND MIDLINE TEAM LINE INSERTION  2/9/2019          [place] CYSTOURETHROSCOPY W/IRRIG & EVAC CLOTS N/A 2/10/2019    Procedure: CYSTOSCOPY, BLADDER BIOPSY, RIGHT SIDED URETEROSCOPY WITH SELECTED CYTOLOGY, CLOT IRRIGATION;  Surgeon: Parker Casillas MD;  Location: [facility];  Service: Urology     SALPINGOOPHORECTOMY Left 1969     TONSILLECTOMY  1942     TOTAL VAGINAL HYSTERECTOMY  1984       Allergies  Allergies   Allergen Reactions     Acamprosate Other (See Comments) and Nausea and Vomiting     Nausea, vomiting and headache       Carbamazepine Other (See Comments)     Patient felt \"drunk\".      Cyclobenzaprine Other (See Comments), Shortness Of Breath and Unknown     Mouth ulcers  Per pt  Mouth sores       Pregabalin Anaphylaxis, Shortness Of Breath, Other (See Comments) and Unknown     Throat closes  Per pt       Sulfa Drugs Anaphylaxis, Shortness Of Breath and Unknown         Per pt       Zolpidem Other (See Comments)     Sleep walking  Other reaction(s): \"Sleep Walking\"       Acetaminophen Other (See Comments)     Rebound headaches       Amiloride Swelling and Unknown     unknown  Per pt       Amoxicillin Diarrhea, Nausea and Vomiting and Unknown     Aspirin Other (See Comments)     History of gastric ulcers and instructed to not take more than 81 mg/d, " 10/5/17 takes 81 mg at home  Stomach        Chromium Other (See Comments)     Staples: Reaction to all metals.States serious reactions after surgery   Staples: Reaction to all metals.States serious reactions after surgery        Duloxetine Hcl Unknown     Per pt     Nsaids Other (See Comments)     H/o ulcers  Stomach ulcers       Other Drug Allergy (See Comments)       and Staples: turned black into the groin, was told to never have staples again     Oxycodone Headache     Serotonin Other (See Comments)     Sulindac      Tolmetin Unknown and Other (See Comments)     History of ulcer  Hx of an ulcer       Verapamil Other (See Comments)     Bradycardia     Valproic Acid Rash       Prior to Admission Medications   Medications Prior to Admission   Medication Sig Dispense Refill Last Dose     apixaban ANTICOAGULANT (ELIQUIS) 5 MG tablet Take 1 tablet (5 mg) by mouth 2 times daily 180 tablet 3 9/17/2021 at am     azelastine (ASTEPRO) 0.15 % nasal spray Spray 2 sprays into both nostrils 2 times daily    9/17/2021 at am     busPIRone (BUSPAR) 10 MG tablet One-half tab bedtime 30 tablet 1 9/16/2021 at pm     Butalbital-Acetaminophen (PHRENILIN)  MG TABS per tablet Take 1 tablet by mouth every 4 hours as needed for headaches   Past Week at Unknown time     carvedilol (COREG) 12.5 MG tablet Take 12.5 mg by mouth 2 times daily (with meals)   9/17/2021 at am     fentaNYL (DURAGESIC) 75 mcg/hr 72 hr patch Place 1 patch onto the skin every 48 hours remove old patch. 15 patch 0 9/16/2021 at am; due new patch 9/18 AM     furosemide (LASIX) 20 MG tablet Take 40 mg by mouth daily    9/17/2021 at am     lamoTRIgine (LAMICTAL) 100 MG tablet Take 200 mg by mouth 2 times daily    9/17/2021 at am     levothyroxine (SYNTHROID/LEVOTHROID) 50 MCG tablet Take 50 mcg by mouth    9/17/2021 at am     lisinopril (ZESTRIL) 40 MG tablet Take 40 mg by mouth At Bedtime    9/16/2021 at pm     naloxone (NARCAN) 4 MG/0.1ML nasal spray Spray 1 spray  (4 mg) into one nostril alternating nostrils once as needed for opioid reversal every 2-3 minutes until assistance arrives 0.2 mL 0 Unknown at Unknown time     nortriptyline (PAMELOR) 75 MG capsule Take 1 capsule (75 mg) by mouth At Bedtime 30 capsule 1 2021 at pm     PRALUENT 75 MG/ML injectable pen INJECT 75MG UNDER THE SKIN EVERY 14 DAYS   2021 at due on 21     rosuvastatin (CRESTOR) 20 MG tablet Take 20 mg by mouth At Bedtime Take with 40 mg tab for total daily dose of 60 mg.   2021 at Unknown time     rosuvastatin (CRESTOR) 40 MG tablet Take 40 mg by mouth At Bedtime Take with 20 mg for total daily dose of 60 mg.   2021 at pm     senna (SENNA-TIME) 8.6 MG tablet Take 1-3 tablets by mouth daily as needed    2021 at Unknown time     SUMAtriptan (IMITREX) 50 MG tablet Take 50 mg by mouth at onset of headache    Past Week at Unknown time     traZODone (DESYREL) 100 MG tablet Take 400 mg by mouth At Bedtime    2021 at pm; verifed she is taking 400mg nightly     valACYclovir (VALTREX) 1000 mg tablet Take 1,000 mg by mouth 3 times daily For 7 days for Shingles        zonisamide (ZONEGRAN) 25 MG capsule Take 50 mg by mouth daily    2021 at am     Coenzyme Q10 (COQ10) 200 MG CAPS Take 1 capsule by mouth daily (Patient not taking: Reported on 2021) 30 capsule 1 not started at not started       Social History  Reviewed, and she  reports that she quit smoking about 21 years ago. She has a 20.00 pack-year smoking history. She has never used smokeless tobacco. She reports that she does not drink alcohol and does not use drugs.  Social History     Tobacco Use     Smoking status: Former Smoker     Packs/day: 1.00     Years: 20.00     Pack years: 20.00     Quit date: 2000     Years since quittin.7     Smokeless tobacco: Never Used   Substance Use Topics     Alcohol use: No     Noemi Mayer PHarmD  Acute Care Pain Management Program  [facility] ([place], [place], [place])    With questions call [phone]  Preference if for Angle Jroge Coburn  Click HERE to page Charleen

## 2021-09-19 NOTE — PLAN OF CARE
Physical Therapy Discharge Summary    Reason for therapy discharge:    All goals and outcomes met, no further needs identified.    Progress towards therapy goal(s). See goals on Care Plan in [facility] electronic health record for goal details.  Goals met    Therapy recommendation(s):    Continued therapy is recommended.  Rationale/Recommendations:  Pt would benefit from home care PT to transition to appropriate assistive device in home setting with therapist in order to prevent falls.

## 2021-09-20 ENCOUNTER — TRANSFERRED RECORDS (OUTPATIENT)
Dept: HEALTH INFORMATION MANAGEMENT | Facility: CLINIC | Age: 81
End: 2021-09-20

## 2021-09-21 ENCOUNTER — TELEPHONE (OUTPATIENT)
Dept: FAMILY MEDICINE | Facility: CLINIC | Age: 81
End: 2021-09-21

## 2021-09-21 ENCOUNTER — TELEPHONE (OUTPATIENT)
Dept: PALLIATIVE MEDICINE | Facility: OTHER | Age: 81
End: 2021-09-21

## 2021-09-21 DIAGNOSIS — G89.4 CHRONIC PAIN SYNDROME: Primary | ICD-10-CM

## 2021-09-21 NOTE — TELEPHONE ENCOUNTER
Reason for Call: Request for an order or referral:    Order or referral being requested: okay to delay skilled nursing until tomorrow?    Date needed: as soon as possible    Has the patient been seen by the PCP for this problem? Not Applicable    Additional comments:     Phone number   [facility] [phone]       Best Time:      Can we leave a detailed message on this number?  YES    Call taken on 9/21/2021 at 12:38 PM by Linda Tiwari

## 2021-09-21 NOTE — TELEPHONE ENCOUNTER
Patient leaves a message stating she was just discharged from the hospital and is requesting a call back from a nurse.  RN reviewed chart and returned call to patient, but received voicemail and left a message for a returned call.    9/17/21-9/19/21 Admission Notes  81 year old old female past medical history significant for hypertension hypothyroidism, CKD stage III, ADD, chronic pain, Opiate dependence, depression, solitary kidney, status post right nephroureterectomy, PE, multiple venous thromboembolism on Eliquis status post IVC filter presented to emergency room with complaints of gait instability, recent falls. She had multiple falls over the last 2 months, and one occasion hitting her head.  MRI, CT head, no acute changes.  Carotid ultrasound less than 50% bilateral stenosis, needs to be followed up as outpatient.  Occupational Therapy did cognitive assessment and she scored 20 out of 30.  Recommending medication management, no driving until further rest facial of assessment.  Daughter, patient was informed.  She is on multiple psychiatric medications and fentanyl patch, apparently on day of admission, she had 2 patches on and reported that she forgot to take off old one.  She follows up closely with Dr. Lynn at the pain clinic and would recommend weaning her off as tolerated.  She will also follow-up with psychiatry and primary closely  for other psychiatric medication changes.  She is discharged home with home care.

## 2021-09-21 NOTE — TELEPHONE ENCOUNTER
Reason for Call:  Other call back    Detailed comments: just out of the hospital, needs a cognitive test for driving and all of her rx. Need to be gone over and needs an appt to see Dr. Rees within this week    Phone Number Patient can be reached at: Cell number on file:    Telephone Information:   Mobile [phone]       Best Time:     Can we leave a detailed message on this number? YES    Call taken on 9/21/2021 at 12:30 PM by Linda Tiwari

## 2021-09-22 ENCOUNTER — TELEPHONE (OUTPATIENT)
Dept: FAMILY MEDICINE | Facility: CLINIC | Age: 81
End: 2021-09-22

## 2021-09-22 DIAGNOSIS — G90.511 COMPLEX REGIONAL PAIN SYNDROME TYPE 1 OF RIGHT UPPER EXTREMITY: Primary | ICD-10-CM

## 2021-09-22 RX ORDER — FENTANYL 50 UG/1
1 PATCH TRANSDERMAL
Qty: 10 PATCH | Refills: 0 | Status: SHIPPED | OUTPATIENT
Start: 2021-09-22 | End: 2021-10-04

## 2021-09-22 NOTE — TELEPHONE ENCOUNTER
Reason for Call:  Home Health Care    SHADY with [facility] called regarding (reason for call): verbal nursing orders    Orders are needed for this patient. nursing    PT: none    OT: none    Skilled Nursinx/wk for 2 wks, 1x/wk for 3wks, and 3 PRNS    Pt Provider: Rees    Phone Number Homecare Nurse can be reached at: [phone]    Can we leave a detailed message on this number? YES    Phone number patient can be reached at: Home number on file [phone] (home)    Best Time: anytime    Call taken on 2021 at 4:27 PM by Pilo Wick

## 2021-09-22 NOTE — TELEPHONE ENCOUNTER
Discussed with patient after recent hospitalization. There were concerns with falls. She is concerned since her concussion she has more problems with ADD and PTSD symptoms.    In the hospital she was changed to fentanyl 62.5 mcg patch. We will now change to 50 mcg patch and she will dispose of the 75's.    She has discontinued the BuSpar. Her home medical supply called by [facility] is called and we will review medications there as well.    She has an appointment tomorrow with her psychologist through the brain injury program.    Reviewed we like her to establish with a dedicated psychiatrist to address these things as they are likely to be required to have many changes which would be difficult for me to do through the pain clinic.

## 2021-09-22 NOTE — TELEPHONE ENCOUNTER
Faxed.    Charu Parra, CMA    
Forms Request  Name of form/paperwork: [employer] [facility]   Have you been seen for this request: N/A  Do we have the form: YES IN DR IGNACIO INBOX  When is form needed by: WHEN DONE  How would you like the form returned: [phone]  Patient Notified form requests are processed in 3-5 business days: NO     Okay to leave a detailed message? NO     ,  
Ready to fax  
36.9

## 2021-09-22 NOTE — TELEPHONE ENCOUNTER
Angely from [employer] calls requesting a call back to discuss medications.  Dr. Lynn, I am attaching this to the encounter that has not been addressed yet from what I can see since her discharge.  Please advise.

## 2021-09-23 ENCOUNTER — TELEPHONE (OUTPATIENT)
Dept: PALLIATIVE MEDICINE | Facility: OTHER | Age: 81
End: 2021-09-23
Payer: MEDICARE

## 2021-09-23 ENCOUNTER — VIRTUAL VISIT (OUTPATIENT)
Dept: NEUROLOGY | Facility: CLINIC | Age: 81
End: 2021-09-23
Payer: MEDICARE

## 2021-09-23 DIAGNOSIS — F43.10 PTSD (POST-TRAUMATIC STRESS DISORDER): Primary | ICD-10-CM

## 2021-09-23 PROCEDURE — 90834 PSYTX W PT 45 MINUTES: CPT | Mod: 95 | Performed by: PSYCHOLOGIST

## 2021-09-23 RX ORDER — TRAZODONE HYDROCHLORIDE 100 MG/1
100 TABLET ORAL AT BEDTIME
Qty: 30 TABLET | Refills: 1 | Status: SHIPPED | OUTPATIENT
Start: 2021-09-23 | End: 2021-10-01

## 2021-09-23 RX ORDER — BUTALBITAL/ACETAMINOPHEN 50MG-325MG
1 TABLET ORAL DAILY PRN
Qty: 15 TABLET | Refills: 1 | Status: SHIPPED | OUTPATIENT
Start: 2021-09-23 | End: 2022-02-08

## 2021-09-23 RX ORDER — VITAMIN B COMPLEX
1 TABLET ORAL DAILY
Qty: 30 CAPSULE | Refills: 3 | Status: SHIPPED | OUTPATIENT
Start: 2021-09-23 | End: 2021-10-04

## 2021-09-23 RX ORDER — ZONISAMIDE 25 MG/1
25 CAPSULE ORAL 2 TIMES DAILY
Qty: 60 CAPSULE | Refills: 3 | Status: SHIPPED | OUTPATIENT
Start: 2021-09-23 | End: 2021-11-15

## 2021-09-23 RX ORDER — LAMOTRIGINE 100 MG/1
100 TABLET ORAL 2 TIMES DAILY
Qty: 60 TABLET | Refills: 3 | Status: SHIPPED | OUTPATIENT
Start: 2021-09-23 | End: 2021-11-11

## 2021-09-23 NOTE — TELEPHONE ENCOUNTER
[facility]

## 2021-09-23 NOTE — PROGRESS NOTES
Psychology Progress Note    Date: September 23, 2021    Time length and type of treatment: 45 minutes (1:05 PM - 1:50 PM), individual therapy    After review of the patient's situation, this visit was changed from an in-person visit to a  video visit initially via [employer], with a switch to [employer] due to connectivity problems, to reduce the risk of COVID 19 exposure. Patient was informed that policies and procedures that govern in-person sessions would also apply to  video sessions. Patient was also informed that  video sessions would be discontinued when COVID 19 exposure is no longer a concern (as determined by [facility]).     Patient location: Patient home in [place], [place]  Provider location:  [facility], [place], [place]    Patient was in agreement with proceeding with a  video session.      Necessity: This session is necessary to address the patient's PTSD and adjustment disorder.  Today we focus on the patient's treatment plan, specifically exploring barriers to compliance with medical treatment plan.  The reader is invited to review the patient's full treatment plan in the Media section of the patient's Epic medical record.    Psychotherapeutic Technique: This writer utilized motivational interviewing, active listening, reassurance and support in the context of cognitive behavioral therapy to address the above.      MENTAL STATUS EVALUATION  Grooming: Within normal limits  Attire: Appropriate  Age: Appears Stated  Behavior Towards Examiner: Cooperative  Motor Activity: Agitated  Eye Contact: Appropriate  Mood: Anxious   Affect: tearful  Speech/Language: Mildly pressured  Attention: Fair  Concentration: Sufficient  Thought Process: tangential  Thought Content: Clear    Orientation: Appeared oriented to person, place, and time, though not formally established  Memory: No evidence of impairment.  Judgement: Adequate  Estimated Intelligence: Average  Demonstrated  "Insight: Adequate  Fund of Knowledge: Adequate    Intervention:   Patient was recently hospitalized and reported great frustration with the experience.  Patient noted she has been reporting that she \"did not feel like herself\" and that something was not \"quite right\" for several months, and hasn't allowed herself to drive because of her concerns, but felt that instead of her concerns finally being addressed, she felt blamed and punished.  Patient asserted that forgetting to take off her old fentanyl patch when she put on her new patch was a mistake and expressed frustration and fear over side effects and pain control with the planned reduction, which she views as punitive. Patient also requested her daughter attend our next therapy session, asserting that the physician she saw in the hospital is requiring this. We discussed the reasons for and against having her daughter attend and it is likely that her oldest daughter will participate in our next appointment. Motivational Interviewing was utilized to reinforce positive attitude toward driving assessment in particular.     Progress:  Patient remains frustrated with her hospitalization experience, but expressed a positive intention to address concerns.      Plan:   We will meet again in 1 week to address the patient's PTSD and adjustment disorder.  Estimated duration of treatment is 10+ individual therapy sessions (24438) at weekly intervals. Treatment is expected to be completed by September 2022.     Diagnosis:  Posttraumatic Stress Disorder  Adjustment Disorder with mixed anxiety and depressed mood  Attention-Deficit/Hyperactivity Disorder, combined presentation  "

## 2021-09-23 NOTE — TELEPHONE ENCOUNTER
[employer] calls, questions regarding medication daily dosing and strengths.    Call to [employer]: Medication review completed    zonisamide 25mg take 50 AM and 25 mg PM(per patient)  Currently ordered 50 mg daily    Lamotrigine 100 mg twice daily(per patient)  Currently ordered at 200 mg twice daily    Trazodone 100 mg 2-4 tabs at HS as needed(per patient)  Currently ordered at 400 mg at HS    Butalbitol/acetaminophen as well as a butalbitol/acetaminophen/asa(per patient)  Currently ordered, butalbitol/acetaminophen only    Coenzyme: need an order     Pharmacy: [facility] in [place]    All orders in cue will need to be reviewed and directions for use and quantities entered as provider would like patient to be taking these.  Please add co-enzyme if appropriate  Once reviewed and entered please sign and route back so this information can be faxed to [employer]: [phone]

## 2021-09-23 NOTE — LETTER
9/23/2021         RE: Sandy Spencer  [address]  [place] [address]        Dear Colleague,    Thank you for referring your patient, Sandy Spencer, to the [facility]. Please see a copy of my visit note below.    Psychology Progress Note    Date: September 23, 2021    Time length and type of treatment: 45 minutes (1:05 PM - 1:50 PM), individual therapy    After review of the patient's situation, this visit was changed from an in-person visit to a  video visit initially via [employer], with a switch to [employer] due to connectivity problems, to reduce the risk of COVID 19 exposure. Patient was informed that policies and procedures that govern in-person sessions would also apply to  video sessions. Patient was also informed that  video sessions would be discontinued when COVID 19 exposure is no longer a concern (as determined by [facility]).     Patient location: Patient home in [place], [place]  Provider location:  [facility], [place], [place]    Patient was in agreement with proceeding with a  video session.      Necessity: This session is necessary to address the patient's PTSD and adjustment disorder.  Today we focus on the patient's treatment plan, specifically exploring barriers to compliance with medical treatment plan.  The reader is invited to review the patient's full treatment plan in the Media section of the patient's Epic medical record.    Psychotherapeutic Technique: This writer utilized motivational interviewing, active listening, reassurance and support in the context of cognitive behavioral therapy to address the above.      MENTAL STATUS EVALUATION  Grooming: Within normal limits  Attire: Appropriate  Age: Appears Stated  Behavior Towards Examiner: Cooperative  Motor Activity: Agitated  Eye Contact: Appropriate  Mood: Anxious   Affect: tearful  Speech/Language: Mildly pressured  Attention: Fair  Concentration:  "Sufficient  Thought Process: tangential  Thought Content: Clear    Orientation: Appeared oriented to person, place, and time, though not formally established  Memory: No evidence of impairment.  Judgement: Adequate  Estimated Intelligence: Average  Demonstrated Insight: Adequate  Fund of Knowledge: Adequate    Intervention:   Patient was recently hospitalized and reported great frustration with the experience.  Patient noted she has been reporting that she \"did not feel like herself\" and that something was not \"quite right\" for several months, and hasn't allowed herself to drive because of her concerns, but felt that instead of her concerns finally being addressed, she felt blamed and punished.  Patient asserted that forgetting to take off her old fentanyl patch when she put on her new patch was a mistake and expressed frustration and fear over side effects and pain control with the planned reduction, which she views as punitive. Patient also requested her daughter attend our next therapy session, asserting that the physician she saw in the hospital is requiring this. We discussed the reasons for and against having her daughter attend and it is likely that her oldest daughter will participate in our next appointment. Motivational Interviewing was utilized to reinforce positive attitude toward driving assessment in particular.     Progress:  Patient remains frustrated with her hospitalization experience, but expressed a positive intention to address concerns.      Plan:   We will meet again in 1 week to address the patient's PTSD and adjustment disorder.  Estimated duration of treatment is 10+ individual therapy sessions (04423) at weekly intervals. Treatment is expected to be completed by September 2022.     Diagnosis:  Posttraumatic Stress Disorder  Adjustment Disorder with mixed anxiety and depressed mood  Attention-Deficit/Hyperactivity Disorder, combined presentation      Again, thank you for allowing me to " participate in the care of your patient.        Sincerely,        Deann Meeks Psy.D, LP

## 2021-09-24 ENCOUNTER — OFFICE VISIT (OUTPATIENT)
Dept: FAMILY MEDICINE | Facility: CLINIC | Age: 81
End: 2021-09-24
Payer: MEDICARE

## 2021-09-24 VITALS
SYSTOLIC BLOOD PRESSURE: 120 MMHG | HEART RATE: 82 BPM | OXYGEN SATURATION: 94 % | WEIGHT: 152.4 LBS | DIASTOLIC BLOOD PRESSURE: 60 MMHG | BODY MASS INDEX: 27 KG/M2

## 2021-09-24 DIAGNOSIS — T88.7XXA MEDICATION SIDE EFFECTS: ICD-10-CM

## 2021-09-24 DIAGNOSIS — R29.6 RECURRENT FALLS: Primary | ICD-10-CM

## 2021-09-24 PROCEDURE — 99213 OFFICE O/P EST LOW 20 MIN: CPT | Performed by: FAMILY MEDICINE

## 2021-09-24 NOTE — LETTER
September 24, 2021      Regarding : Sandy ZIMMERMAN Spencer  [address]  [place] [facility] [address]              To whom it may concern,    Sandy was evaluated in clinic today . I have given her approval to return to driving locally.          Sincerely,      Fabio Gallardo MD

## 2021-09-24 NOTE — PROGRESS NOTES
Assessment / Plan    Sandy was seen today for hospital f/u.    Diagnoses and all orders for this visit:    Recurrent falls    Medication side effects    Patient with recent hospital stay related to recurrent falls likely due to effects of polypharmacy.  As we were reviewing the hospital stay, patient abruptly reported that she was unable to stay to complete the visit due to other commitments.  She then exited the exam room.     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     Hospital Follow-up Visit:    Hospital/Nursing Home/IP Rehab Facility: [facility]  Date of Admission: 9/17/21  Date of Discharge: 9/19/21  Reason(s) for Admission: falls, polypharmacy      Was your hospitalization related to COVID-19? No   Problems taking medications regularly:  Has had issues with taking medications incorrectly.   Medication changes since discharge: None  Problems adhering to non-medication therapy:  Concerns regarding patient's ability to participate in her general medical care without assistance.      Summary of hospitalization:  [facility] discharge summary reviewed    81 year old old female past medical history significant for hypertension hypothyroidism, CKD stage III, ADD, chronic pain, Opiate dependence, depression, solitary kidney, status post right nephroureterectomy, PE, multiple venous thromboembolism on Eliquis status post IVC filter presented to emergency room with complaints of gait instability, recent falls. She had multiple falls over the last 2 months, and one occasion hitting  her head.  MRI, CT head, no acute changes.  Carotid ultrasound less than 50% bilateral stenosis, needs to be followed up as outpatient.  Occupational Therapy did cognitive assessment and she scored 20 out of 30.  Recommending medication management, no driving until further rest facial of assessment.  Daughter, patient was informed.  She is on multiple psychiatric medications and fentanyl patch, apparently on day of admission, she had 2 patches on and reported that she forgot to take off old one.  She follows up closely with Dr. Lynn at the pain clinic and would recommend weaning her off as tolerated.  She will also follow-up with psychiatry and primary closely  for other psychiatric medication changes.  She is discharged home with home care.  Diagnostic Tests/Treatments reviewed.  Follow up needed: yes, follow-up with pain clinic to consider wean off of opioids.     Other Healthcare Providers Involved in Patient s Care:         Neurology, pain clinic  Update since discharge: no further falls. Patient reports that pain has not been as well controlled and feels that plan to reduce her pain medication is somewhat punitive.    Post Discharge Medication Reconciliation: unable to reconcile discharge medications due to patient needing to leave before medications could be reconciled. .  Plan of care communicated with patient     KT-7   [employer], 2002; Used with Permission) 9/25/2021   KT 7 TOTAL SCORE    1. Feeling nervous, anxious, or on edge 1   2. Not being able to stop or control worrying 0   3. Worrying too much about different things 1   4. Trouble relaxing 1   5. Being so restless that it is hard to sit still 1   6. Becoming easily annoyed or irritable 1   7. Feeling afraid, as if something awful might happen 0   KT-7 Total Score 5   If you checked any problems, how difficult have they made it for you to do your work, take care of things at home, or get along with other people? Somewhat difficult   PHQ-9  ([employer]) 9/25/2021   1.  Little interest or pleasure in doing things 1   2.  Feeling down, depressed, or hopeless 1   3.  Trouble falling or staying asleep, or sleeping too much 3   4.  Feeling tired or having little energy 1   5.  Poor appetite or overeating 1   6.  Feeling bad about yourself 1   7.  Trouble concentrating 2   8.  Moving slowly or restless 0   9.  Suicidal or self-harm thoughts 0   PHQ-9 Total Score 10   Difficulty at work, home, or with people Somewhat difficult          ROS:   Negative except as noted above in HPI.     Objective  /60 (BP Location: Right arm, Patient Position: Sitting, Cuff Size: Adult Regular)   Pulse 82   Wt 69.1 kg (152 lb 6.4 oz)   SpO2 94%   Breastfeeding No   BMI 27.00 kg/m       General: alert/oriiented to person/place.  agitated, fidgets throughout visit.   Affect: anxious.  Speech somewhat rapid/pressured. Thoughts are somewhat tangential.     Fabio Gallardo MD   Family medicine physician  [facility]

## 2021-09-25 ASSESSMENT — ANXIETY QUESTIONNAIRES
6. BECOMING EASILY ANNOYED OR IRRITABLE: SEVERAL DAYS
IF YOU CHECKED OFF ANY PROBLEMS ON THIS QUESTIONNAIRE, HOW DIFFICULT HAVE THESE PROBLEMS MADE IT FOR YOU TO DO YOUR WORK, TAKE CARE OF THINGS AT HOME, OR GET ALONG WITH OTHER PEOPLE: SOMEWHAT DIFFICULT
2. NOT BEING ABLE TO STOP OR CONTROL WORRYING: NOT AT ALL
GAD7 TOTAL SCORE: 5
1. FEELING NERVOUS, ANXIOUS, OR ON EDGE: SEVERAL DAYS
5. BEING SO RESTLESS THAT IT IS HARD TO SIT STILL: SEVERAL DAYS
3. WORRYING TOO MUCH ABOUT DIFFERENT THINGS: SEVERAL DAYS
7. FEELING AFRAID AS IF SOMETHING AWFUL MIGHT HAPPEN: NOT AT ALL

## 2021-09-25 ASSESSMENT — PATIENT HEALTH QUESTIONNAIRE - PHQ9
5. POOR APPETITE OR OVEREATING: SEVERAL DAYS
SUM OF ALL RESPONSES TO PHQ QUESTIONS 1-9: 10

## 2021-09-26 ASSESSMENT — ANXIETY QUESTIONNAIRES: GAD7 TOTAL SCORE: 5

## 2021-09-30 ENCOUNTER — VIRTUAL VISIT (OUTPATIENT)
Dept: PHYSICAL MEDICINE AND REHAB | Facility: CLINIC | Age: 81
End: 2021-09-30
Payer: MEDICARE

## 2021-09-30 ENCOUNTER — VIRTUAL VISIT (OUTPATIENT)
Dept: NEUROLOGY | Facility: CLINIC | Age: 81
End: 2021-09-30
Payer: MEDICARE

## 2021-09-30 DIAGNOSIS — R53.81 POST VIRAL DEBILITY: ICD-10-CM

## 2021-09-30 DIAGNOSIS — Z86.16 HISTORY OF COVID-19: Primary | ICD-10-CM

## 2021-09-30 DIAGNOSIS — F43.23 ADJUSTMENT DISORDER WITH MIXED ANXIETY AND DEPRESSED MOOD: Primary | ICD-10-CM

## 2021-09-30 DIAGNOSIS — B94.8 POST VIRAL DEBILITY: ICD-10-CM

## 2021-09-30 PROCEDURE — 99215 OFFICE O/P EST HI 40 MIN: CPT | Mod: 95 | Performed by: PHYSICAL MEDICINE & REHABILITATION

## 2021-09-30 PROCEDURE — 90834 PSYTX W PT 45 MINUTES: CPT | Mod: 95 | Performed by: PSYCHOLOGIST

## 2021-09-30 SDOH — SOCIAL STABILITY: SOCIAL NETWORK: I HAVE TROUBLE DOING ALL OF MY REGULAR LEISURE ACTIVITIES WITH OTHERS: SOMETIMES

## 2021-09-30 SDOH — SOCIAL STABILITY: SOCIAL NETWORK: I HAVE TROUBLE DOING ALL OF THE ACTIVITIES WITH FRIENDS THAT I WANT TO DO: RARELY

## 2021-09-30 SDOH — SOCIAL STABILITY: SOCIAL NETWORK: I HAVE TROUBLE DOING ALL OF THE FAMILY ACTIVITIES THAT I WANT TO DO: SOMETIMES

## 2021-09-30 SDOH — SOCIAL STABILITY: SOCIAL NETWORK: I HAVE TROUBLE DOING ALL OF MY USUAL WORK (INCLUDE WORK AT HOME): RARELY

## 2021-09-30 SDOH — SOCIAL STABILITY: SOCIAL NETWORK: PROMIS ABILITY TO PARTICIPATE IN SOCIAL ROLES & ACTIVITIES T-SCORE: 48.1

## 2021-09-30 ASSESSMENT — ANXIETY QUESTIONNAIRES
4. TROUBLE RELAXING: SEVERAL DAYS
2. NOT BEING ABLE TO STOP OR CONTROL WORRYING: NOT AT ALL
3. WORRYING TOO MUCH ABOUT DIFFERENT THINGS: SEVERAL DAYS
6. BECOMING EASILY ANNOYED OR IRRITABLE: SEVERAL DAYS
1. FEELING NERVOUS, ANXIOUS, OR ON EDGE: SEVERAL DAYS
GAD7 TOTAL SCORE: 6
GAD7 TOTAL SCORE: 6
7. FEELING AFRAID AS IF SOMETHING AWFUL MIGHT HAPPEN: SEVERAL DAYS
GAD7 TOTAL SCORE: 6
8. IF YOU CHECKED OFF ANY PROBLEMS, HOW DIFFICULT HAVE THESE MADE IT FOR YOU TO DO YOUR WORK, TAKE CARE OF THINGS AT HOME, OR GET ALONG WITH OTHER PEOPLE?: SOMEWHAT DIFFICULT
7. FEELING AFRAID AS IF SOMETHING AWFUL MIGHT HAPPEN: SEVERAL DAYS
5. BEING SO RESTLESS THAT IT IS HARD TO SIT STILL: SEVERAL DAYS

## 2021-09-30 ASSESSMENT — ENCOUNTER SYMPTOMS
NECK MASS: 0
POLYPHAGIA: 1
MUSCLE CRAMPS: 1
POLYDIPSIA: 0
FEVER: 0
DECREASED APPETITE: 0
WEIGHT LOSS: 0
NERVOUS/ANXIOUS: 1
SMELL DISTURBANCE: 0
JOINT SWELLING: 1
PANIC: 0
HALLUCINATIONS: 0
EYE PAIN: 1
DOUBLE VISION: 0
NECK PAIN: 1
ARTHRALGIAS: 1
EYE IRRITATION: 1
INSOMNIA: 1
DECREASED CONCENTRATION: 1
WEIGHT GAIN: 1
DEPRESSION: 1
STIFFNESS: 1
SKIN CHANGES: 0
TASTE DISTURBANCE: 0
MUSCLE WEAKNESS: 1
SORE THROAT: 0
EYE WATERING: 0
EYE REDNESS: 0
SINUS PAIN: 1
POOR WOUND HEALING: 1
NAIL CHANGES: 1
FATIGUE: 1
MYALGIAS: 1
TROUBLE SWALLOWING: 1
BACK PAIN: 1
CHILLS: 0
HOARSE VOICE: 1
INCREASED ENERGY: 1
SINUS CONGESTION: 0
ALTERED TEMPERATURE REGULATION: 1
NIGHT SWEATS: 0

## 2021-09-30 ASSESSMENT — PATIENT HEALTH QUESTIONNAIRE - PHQ9
SUM OF ALL RESPONSES TO PHQ QUESTIONS 1-9: 12
SUM OF ALL RESPONSES TO PHQ QUESTIONS 1-9: 12
10. IF YOU CHECKED OFF ANY PROBLEMS, HOW DIFFICULT HAVE THESE PROBLEMS MADE IT FOR YOU TO DO YOUR WORK, TAKE CARE OF THINGS AT HOME, OR GET ALONG WITH OTHER PEOPLE: SOMEWHAT DIFFICULT
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
## 2021-09-30 NOTE — PROGRESS NOTES
Psychology Progress Note    Date: 9/30/2021    Time length and type of treatment: 44 minutes (9:03 AM - 9:47 AM) , individual therapy    After review of the patient's situation, this visit was changed from an in-person visit to a  video visit via [employer] to reduce the risk of COVID 19 exposure. Patient was informed that policies and procedures that govern in-person sessions would also apply to  video sessions. Patient was also informed that  video sessions would be discontinued when COVID 19 exposure is no longer a concern (as determined by [facility]).     Patient location: Patient home in [place], [place]  Provider location:  [facility], [place], [place]    Patient was in agreement with proceeding with a  video session.      Necessity: This session is necessary to address the patient's PTSD, anxiety, and depressed mood.  Today we focus on the patient's treatment plan, specifically exploring coping strategies.  The reader is invited to review the patient's full treatment plan in the Media section of the patient's Epic medical record.    Psychotherapeutic Technique: This writer utilized motivational interviewing, active listening, reassurance and support in the context of cognitive behavioral therapy to address the above.      MENTAL STATUS EVALUATION  Grooming: Within normal limits  Attire: Appropriate  Age: Appears Stated  Behavior Towards Examiner: Cooperative  Motor Activity: Within normal limits  Eye Contact: Appropriate  Mood: Anxious   Affect: full range  Speech/Language: normal  Attention: Easily distracted  Concentration: Sufficient  Thought Process: tangential  Thought Content: Clear    Orientation: Appeared oriented to person, place, and time, though not formally established   Memory: No evidence of impairment.  Judgement: Adequate  Estimated Intelligence: Average  Demonstrated Insight: Adequate  Fund of Knowledge: Adequate    Intervention:  Patient reported that  she has been sleeping poorly largely due to pain, explaining that on top of her chronic pain, her eyes have been hurting since her concussion, and she now has shingles which is very painful.  Patient was provided psychoeducation related to pain and the relationship between depression and pain, and we discussed several pain management strategies.     Progress:  Patient reported understanding of pain management strategies and a commitment to practicing mindfulness over the next week.     Plan:   We will meet again in 1 week to address the patient's PTSD and adjustment disorder.  Estimated duration of treatment is 10+ individual therapy sessions (46864) at weekly intervals. Treatment is expected to be completed by September 2022.     Diagnosis:  Adjustment Disorder with mixed anxiety and depressed mood  Posttraumatic Stress Disorder  Attention-Deficit/Hyperactivity Disorder, combined presentation

## 2021-09-30 NOTE — LETTER
9/30/2021         RE: Sandy Spencer  [address]  [facility] [address]        Dear Colleague,    Thank you for referring your patient, Sandy Spencer, to the [facility]. Please see a copy of my visit note below.    Psychology Progress Note    Date: 9/30/2021    Time length and type of treatment: 44 minutes (9:03 AM - 9:47 AM) , individual therapy    After review of the patient's situation, this visit was changed from an in-person visit to a  video visit via [employer] to reduce the risk of COVID 19 exposure. Patient was informed that policies and procedures that govern in-person sessions would also apply to  video sessions. Patient was also informed that  video sessions would be discontinued when COVID 19 exposure is no longer a concern (as determined by [facility]).     Patient location: Patient home in [place], [place]  Provider location:  [facility], [place], [place]    Patient was in agreement with proceeding with a  video session.      Necessity: This session is necessary to address the patient's PTSD, anxiety, and depressed mood.  Today we focus on the patient's treatment plan, specifically exploring coping strategies.  The reader is invited to review the patient's full treatment plan in the Media section of the patient's Epic medical record.    Psychotherapeutic Technique: This writer utilized motivational interviewing, active listening, reassurance and support in the context of cognitive behavioral therapy to address the above.      MENTAL STATUS EVALUATION  Grooming: Within normal limits  Attire: Appropriate  Age: Appears Stated  Behavior Towards Examiner: Cooperative  Motor Activity: Within normal limits  Eye Contact: Appropriate  Mood: Anxious   Affect: full range  Speech/Language: normal  Attention: Easily distracted  Concentration: Sufficient  Thought Process: tangential  Thought Content: Clear    Orientation: Appeared  oriented to person, place, and time, though not formally established   Memory: No evidence of impairment.  Judgement: Adequate  Estimated Intelligence: Average  Demonstrated Insight: Adequate  Fund of Knowledge: Adequate    Intervention:  Patient reported that she has been sleeping poorly largely due to pain, explaining that on top of her chronic pain, her eyes have been hurting since her concussion, and she now has shingles which is very painful.  Patient was provided psychoeducation related to pain and the relationship between depression and pain, and we discussed several pain management strategies.     Progress:  Patient reported understanding of pain management strategies and a commitment to practicing mindfulness over the next week.     Plan:   We will meet again in 1 week to address the patient's PTSD and adjustment disorder.  Estimated duration of treatment is 10+ individual therapy sessions (52201) at weekly intervals. Treatment is expected to be completed by September 2022.     Diagnosis:  Adjustment Disorder with mixed anxiety and depressed mood  Posttraumatic Stress Disorder  Attention-Deficit/Hyperactivity Disorder, combined presentation      Again, thank you for allowing me to participate in the care of your patient.        Sincerely,        Deann Meeks Psy.D, LP

## 2021-10-01 ENCOUNTER — TELEPHONE (OUTPATIENT)
Dept: FAMILY MEDICINE | Facility: CLINIC | Age: 81
End: 2021-10-01

## 2021-10-01 ENCOUNTER — OFFICE VISIT (OUTPATIENT)
Dept: FAMILY MEDICINE | Facility: CLINIC | Age: 81
End: 2021-10-01
Payer: MEDICARE

## 2021-10-01 VITALS
WEIGHT: 151.56 LBS | OXYGEN SATURATION: 98 % | BODY MASS INDEX: 26.86 KG/M2 | DIASTOLIC BLOOD PRESSURE: 56 MMHG | HEIGHT: 63 IN | SYSTOLIC BLOOD PRESSURE: 112 MMHG | HEART RATE: 74 BPM

## 2021-10-01 DIAGNOSIS — I10 ESSENTIAL HYPERTENSION: ICD-10-CM

## 2021-10-01 DIAGNOSIS — F99 MENTAL DISORDER, NOT OTHERWISE SPECIFIED: ICD-10-CM

## 2021-10-01 DIAGNOSIS — Z79.899 POLYPHARMACY: Primary | ICD-10-CM

## 2021-10-01 DIAGNOSIS — B02.8 HERPES ZOSTER WITH COMPLICATION: ICD-10-CM

## 2021-10-01 DIAGNOSIS — G89.4 CHRONIC PAIN SYNDROME: ICD-10-CM

## 2021-10-01 DIAGNOSIS — T14.8XXA ABRASION: ICD-10-CM

## 2021-10-01 PROCEDURE — 99215 OFFICE O/P EST HI 40 MIN: CPT | Performed by: FAMILY MEDICINE

## 2021-10-01 RX ORDER — VALACYCLOVIR HYDROCHLORIDE 1 G/1
TABLET, FILM COATED ORAL
COMMUNITY
Start: 2021-09-28 | End: 2022-01-19

## 2021-10-01 RX ORDER — TRAZODONE HYDROCHLORIDE 100 MG/1
300-400 TABLET ORAL AT BEDTIME
Qty: 360 TABLET | Refills: 1 | Status: SHIPPED | OUTPATIENT
Start: 2021-10-01 | End: 2022-08-01

## 2021-10-01 RX ORDER — VALACYCLOVIR HYDROCHLORIDE 1 G/1
1000 TABLET, FILM COATED ORAL DAILY
Qty: 90 TABLET | Refills: 1 | Status: SHIPPED | OUTPATIENT
Start: 2021-10-01 | End: 2021-11-11

## 2021-10-01 ASSESSMENT — MIFFLIN-ST. JEOR: SCORE: 1131.61

## 2021-10-01 ASSESSMENT — ANXIETY QUESTIONNAIRES: GAD7 TOTAL SCORE: 6

## 2021-10-01 ASSESSMENT — PATIENT HEALTH QUESTIONNAIRE - PHQ9: SUM OF ALL RESPONSES TO PHQ QUESTIONS 1-9: 12

## 2021-10-01 NOTE — PROGRESS NOTES
Assessment & Plan     Polypharmacy  -The fentanyl patch duplication was one that was left on as she was interrupted in removal. She remains insistent that her pain is severe and she is going to work with the pain clinic on this. She would like a referral as well to a pharmacist to talk more about her medication regimen and see if there are things she can simplify.   - Med Therapy Management Referral    Herpes zoster with complication  -Discussed with the patient that it is quite uncommon to have as many recurrences of shingles and I wonder if it is maybe Herpes instead. She does not have any active lesions at this time, but will start her on prophylactic dosing of Valtrex for now to see if we can decrease flares, will swab an active lesion as able.   - valACYclovir (VALTREX) 1000 mg tablet  Dispense: 90 tablet; Refill: 1    Chronic pain syndrome  -Working with the pain clinic at this time. Renewal of the trazodone as well today  - traZODone (DESYREL) 100 MG tablet  Dispense: 360 tablet; Refill: 1    Mental disorder, not otherwise specified  -Possible new diagnosis of ADHD, in this setting as well as the difficulty finding medications to help manage her mood disorder will refer to psychiatry for further evaluation, assessment and diagnostic clarification.   - MENTAL HEALTH REFERRAL  - Adult; Psychiatry; Psychiatry; Collaborative Care Psychiatry Service/Bridge to Long-Term Psychiatry as indicated ([phone]); Yes; Several Medications tried and failed; Yes; We will contact you to schedule the appoin...    Abrasion  -Continues to have difficulty healing, I do think that given the location she has had issues leaving the area alone. Recommended continued avoidance of trauma and if not improving we can refer to the wound clinic.     Essential hypertension  -BP under good control.         51 minutes spent on the date of the encounter doing chart review, history and exam, documentation and further activities per the  "note         Return for Next Scheduled visit.    Caitlyn Rees MD  [facility] KWABENA Delgado is a 81 year old who presents for the following health issues     HPI     She is here today for a follow up.Luh think that the reason that she was so discombobulated in the last visit was due to her medications. She did have an extra fentanyl patch on accidentally as well as she does think that it was the bupropion. She was put on Bupropoin and does think that this is what caused her issues. She remains hesitant to have to decrease her patch as she does not have good pain control. They took the trazodone totally away from her in the hospital. She does have [facility] coming to her house, she is going to get discharged from them on10/13. She does not need PT, OT and nursing.     She does have recurrent shingles as well. It's always down her sciatic nerve. The firsttime she had a large one. Left sied. She does feellike they are going to come back again.     She does bounce from things and didalways think that the this was from the pain. She does note that she was diagnosed with PTSD, ADHD and adjustment disorder by mental health.     She does have issues with her eyes aswell. She does want to sit and read and they feellike they arefull of dirt. She is using drops for her eyes.     She did have injections in her neck for her migraines. She is being told to get more injections to see iftchristiey can help with her headaches.     She does still have issues with a wound on the top of her head that is not healing.       Review of Systems   With the exception of the aforementioned issues, 12 point, comprehensive ROS was done and was negative.       Objective    /56 (Patient Position: Sitting, Cuff Size: Adult Regular)   Pulse 74   Ht 1.6 m (5' 3\")   Wt 68.7 kg (151 lb 9 oz)   SpO2 98%   BMI 26.85 kg/m    Body mass index is 26.85 kg/m .  Physical Exam   GENERAL: healthy, alert and no " distress  NECK: no adenopathy, no asymmetry, masses, or scars and thyroid normal to palpation  RESP: lungs clear to auscultation - no rales, rhonchi or wheezes  CV: regular rate and rhythm, normal S1 S2, no S3 or S4, no murmur, click or rub, no peripheral edema and peripheral pulses strong  ABDOMEN: soft, nontender, no hepatosplenomegaly, no masses and bowel sounds normal  MS: no gross musculoskeletal defects noted, no edema  SKIN: non-healing abrasion that is oozing on the top of her head. No drainage, erythema

## 2021-10-01 NOTE — TELEPHONE ENCOUNTER
Forms Request  Name of form/paperwork: [employer] [facility]  Have you been seen for this request: N/A  Do we have the form: YES IN DR IGNACIO INBOX   When is form needed by: WHEN DONE   How would you like the form returned: FAX  Patient Notified form requests are processed in 3-5 business days: NO    Okay to leave a detailed message? NO

## 2021-10-03 ENCOUNTER — HEALTH MAINTENANCE LETTER (OUTPATIENT)
Age: 81
End: 2021-10-03

## 2021-10-04 ENCOUNTER — MEDICAL CORRESPONDENCE (OUTPATIENT)
Dept: HEALTH INFORMATION MANAGEMENT | Facility: CLINIC | Age: 81
End: 2021-10-04

## 2021-10-04 ENCOUNTER — VIRTUAL VISIT (OUTPATIENT)
Dept: PHARMACY | Facility: CLINIC | Age: 81
End: 2021-10-04
Payer: COMMERCIAL

## 2021-10-04 DIAGNOSIS — F06.4 ANXIETY DISORDER DUE TO MEDICAL CONDITION: ICD-10-CM

## 2021-10-04 DIAGNOSIS — R60.1 GENERALIZED EDEMA: ICD-10-CM

## 2021-10-04 DIAGNOSIS — E03.9 HYPOTHYROIDISM, UNSPECIFIED TYPE: ICD-10-CM

## 2021-10-04 DIAGNOSIS — I27.82 OTHER CHRONIC PULMONARY EMBOLISM WITHOUT ACUTE COR PULMONALE (H): ICD-10-CM

## 2021-10-04 DIAGNOSIS — K59.00 CONSTIPATION, UNSPECIFIED CONSTIPATION TYPE: ICD-10-CM

## 2021-10-04 DIAGNOSIS — G47.00 INSOMNIA, UNSPECIFIED TYPE: ICD-10-CM

## 2021-10-04 DIAGNOSIS — E78.5 HYPERLIPIDEMIA LDL GOAL <70: ICD-10-CM

## 2021-10-04 DIAGNOSIS — G89.4 CHRONIC PAIN SYNDROME: ICD-10-CM

## 2021-10-04 DIAGNOSIS — R29.6 FALLS FREQUENTLY: Primary | ICD-10-CM

## 2021-10-04 DIAGNOSIS — B02.8 HERPES ZOSTER WITH COMPLICATION: ICD-10-CM

## 2021-10-04 PROCEDURE — 99607 MTMS BY PHARM ADDL 15 MIN: CPT | Performed by: PHARMACIST

## 2021-10-04 PROCEDURE — 99605 MTMS BY PHARM NP 15 MIN: CPT | Performed by: PHARMACIST

## 2021-10-04 RX ORDER — FENTANYL 75 UG/1
1 PATCH TRANSDERMAL
COMMUNITY
End: 2021-10-07

## 2021-10-04 NOTE — PROGRESS NOTES
Medication Therapy Management (MTM) Encounter    ASSESSMENT:                            Falls: No falls since discharge, except she did have almost 1 incident.  Sounds like she is feeling better since home, possibly due to stopping buspirone?  Encouraged her to continue to follow with neurology.  Per chart review, it appears that when she was last seen at concussion clinic on 6/21/2021, her care was transitioned back to Dr. Lynn and PCP.    Chronic pain: Continue to follow with the pain clinic.  She has continued the 75 mcg patch on her own.  She will need to talk to Dr. Lynn about this and discuss with him on Thursday whether he is open to tapering her to 62.5 mcg instead.  Possible that she may benefit from a change to Butrans patch in the future.    Hyperlipidemia/PAD: Patient will be seeing cardiology next month.  Appears that she used to be on Repatha and Crestor 40 mg nightly.  Unclear why it was increased to 60 mg, but since that is higher than the normal recommended dose, will talk to cardiology about decreasing again.    Depression/anxiety: Appears to need some improvement.  She has a history of issues with serotonergic SSRIs.  Will defer to PCP, consider referral to psychiatry.  She may benefit from Abilify for her mood.    Hypothyroidism:  Last TSH normal.    Hypertension/edema: Last blood pressure at goal.  We will continue to monitor as we want to avoid hypotension as well.  Reviewed okay to take furosemide 2 tablets in the morning, but if she notices worsening in edema, okay to split to a.m. and early evening.    Insomnia: Patient is on a high dose of trazodone, it may be contributing to her confusion, however her symptoms have resolved therefore continue current regimen this time.    Recurrent shingles: Patient is currently on treatment dose Valtrex, then will decrease to 2000 mg daily.  I agree with obtaining biopsy since it does seem odd to have recurrent shingles after receiving the  shingles vaccine.    VTE history: Patient to continue Eliquis and follow-up with cardiology.    Migraine history: Patient continues to have mild migraines, she will continue to follow with neurology for the injections.  Reviewed to split zonisamide back to twice daily.  In addition, could consider increasing nortriptyline, but the risk of falls, confusion, constipation, and dry mouth were discussed.  It does sound like she has not received a full benefit of nortriptyline so far, therefore consider decreasing back to 50 mg.  In addition, she is not tolerating sumatriptan therefore I encouraged her to talk to neurology about other triptan alternatives.  Unclear what the extensive medicine was that she was given.    Constipation: Patient to continue.    PLAN:                            1.  I will talk to Dr. Ybarra about decreasing rosuvastatin back to 40 mg daily    Follow-up: 2-week phone follow-up    SUBJECTIVE/OBJECTIVE:                          Sandy Spencer is a 81 year old female called for a transitions of care visit, but she was referred from Dr. Rees for polypharmacy. She was discharged from [facility] on 9/19/21 for falls. Of note I last spoke with patient in 2016.    Reason for visit: Discuss polypharmacy.  Medication Adherence/Access:  She does have home care through [facility].  Notes that more recently her friend set up her medicines for her. Certain oral medications goes right through her -- has 9 inches of her colon. Would like that considered for her medications.     Falls: Patient presented to ED with complaints of gait instability, recent falls.  She has had multiple falls over the last few months with one occasion hitting her head. MRI, CT head, no acute changes.  Carotid ultrasound less than 50% bilateral stenosis,  was recommended to follow up as outpatient.  OT did cognitive assessment and she scored 20 out of 30.  Recommended no driving until further assessment, but reports she was evaluated by   Sami on 2021 and told she can drive again. She thinks her symptoms had to do with the buspirone, which she is no longer taking. She was following with concussion clinic. She is feeling much better now -- less confusion, not walking to the left.  No falls since home, but she almost fell once at [facility] but was caught.    Chronic pain: Follows with the pain clinic.  Currently on fentanyl 75 mcg patch.  Her fentanyl was decreased to 50 mg by Dr. Lynn after her discharge.  She was found to have two patches on right before her admission.  She admits there was a day where she answered the phone and was distracted and forgot to take the old one off.   Reports she still has the 75 mcg at home. She would like to go down on the fentanyl on a slower pace. Her pain was bad in the hospital on 62.5 mcg since she was also taken off trazodone.   She would like to be off the patch at one point, but her son  and now is not a good time with her pain and the weather changes. If he does not see him anymore then she reports that Dr. Rees will take over and taper down slowly.   Patient has Narcan at home    Hyperlipidemia/PAD: Currently taking Praluent 75 mg every 14 days and rosuvastatin 20 mg + 40 mg (total 60 mg). Was on atorvastatin in the past and changed to rosuvastatin. Denies myalgias. Last lipids checked 2021.     Depression/anxiety: Currently taking lamotrigine 100 mg twice daily, nortriptyline.  She is no longer taking BuSpar. Her mood was down with the switches in the weather. She is anxious due to all her medical issues. She is diagnosed with ADHD and PTSD. She does not have nightmares. She was on Adderall and ritalin in the past. No suicidal thoughts or panic attacks.   She does follow with a therapist.    Hypothyroidism: Currently taking levothyroxine 50 mcg daily. Administration: Has in her bathroom takes in the morning when she wakes up, apart from her other medicines by 1 hour. Last TSH checked  7/13/2021.     Hypertension/edema: Currently taking furosemide, 20 mg x2 (40 mg) AM, carvedilol 12.5 mg twice daily and lisinopril 40 mg daily.  She recently moved furosemide from 20 mg twice daily to all at once in the morning. Patient does monitor BP at home. Home BP = 150/80 when she first wakes up, then goes down to 130 mmHg. Follows with nephrology.  Inside her body she feels like her skin is stretching, but told she does not have any visible signs of edema.  BP Readings from Last 3 Encounters:   10/01/21 112/56   09/24/21 120/60   09/19/21 (!) 171/75       Insomnia: Taking trazodone 100 mg 2-4 tablets nightly. She is now back to taking trazodone - last few nights she took 4 tablets. She takes 2 tablets at bedtime then if not helpful after about an hour she will take the other two. The weather change and her nerve pain is triggering and she is not sleeping well.     Recurrent shingles: Currently taking valacyclovir 1000 mg daily and 3 times daily for 7 days, then will go to once a day. Had shingles 14 times. Did get the shingles vaccine. Will be getting a biopsy from Dr. Rees to make sure that is what is going on. Not noticed that the valacyclovir helps yet, it always stings.    VTE history: Patient was transitioned from warfarin to Eliquis in February 2021.  Her INR at the time was always up and down. Currently taking Eliquis 5 mg twice daily. Gets the generic from a pharmacy in [place]. 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     Migraine history: Taking Zonisamide 25 mg x2 daily AM, nortriptyline 75 mg nightly, butalbital-acetaminophen and sumatriptan 50 mg as needed.  Per chart review she is taking zonisamide instead of topiramate for migraines. Taking for headache prevention.  She was taking zonisamide twice daily, but for some reason it was put in her pillbox as both in the morning recently.  She is seeing [facility] and getting injections every 3 month in her neck, occipital area. The shots contain a  steroid and it is helpful in the beginning. Last time she got Botox 2 weeks ago -- she did not want that and it was not helpful.  She can feel the migraine sitting there but it does not turn into a full migraine.  Nortriptyline was increased from 50 mg in July.  She is not sure if it helped.  She does have a dry mouth.  She uses butalbital very rarely, she is aware of the risk of rebound headaches.  She feels poison whenever she takes sumatriptan.  Reports that there was another medicine prescribed that was very expensive, then she went back to sumatriptan which she also uses rarely.    Constipation: Continue senna 8.6 mg  daily as needed, uses daily 1-2 times daily. Having BMs now, they are formed.     Today's Vitals: There were no vitals taken for this visit.  ----------------  Post Discharge Medication Reconciliation Status: discharge medications reconciled and changed, per note/orders.     Allergies/ADRs: Reviewed in chart  Past Medical History: Reviewed in chart  Tobacco: She reports that she quit smoking about 21 years ago. She has a 20.00 pack-year smoking history. She has never used smokeless tobacco.  Alcohol: Reviewed in chart      I spent 60 minutes with this patient today. All changes were made via collaborative practice agreement with Caitlyn Rees MD. A copy of the visit note was provided to the patient's primary care provider.    The patient declined a summary of these recommendations.     Darlin Elise, Pharm.D., [facility]   Medication Therapy Management Pharmacist   [facility]     Telemedicine Visit Details  Type of service:  Telephone visit  Start Time: 2:06 PM  End Time: 3:06 PM  Originating Location (patient location): Home  Distant Location (provider location):  [facility]     Medication Therapy Recommendations  Hyperlipidemia LDL goal <70    Current Medication: rosuvastatin (CRESTOR) 20 MG tablet   Rationale: Dose too high -  Dosage too high - Safety   Recommendation: Decrease Dose - Decrease rosuvastatin from 60 mg daily to 40 mg daily   Status: Contact Provider - Awaiting Response

## 2021-10-04 NOTE — Clinical Note
Chase!  You are seeing Sandy next month.  She continues Praluent and rosuvastatin 60 mg/day.  I saw in a note that after she started PCSK9 inhibitor, her rosuvastatin was decreased to 40 mg.  Not sure why she is back on 60 mg?  Are you okay with her going back to rosuvastatin 40 mg? Thanks!   Darlin Elise, Pharm.D., BCACP   Medication Therapy Management Pharmacist   [facility]

## 2021-10-04 NOTE — TELEPHONE ENCOUNTER
Faxed and placed in to be scanned.     Copy was made? n/a.    Jessa Rojas, [facility]

## 2021-10-07 ENCOUNTER — VIRTUAL VISIT (OUTPATIENT)
Dept: NEUROLOGY | Facility: CLINIC | Age: 81
End: 2021-10-07
Payer: MEDICARE

## 2021-10-07 ENCOUNTER — OFFICE VISIT (OUTPATIENT)
Dept: PALLIATIVE MEDICINE | Facility: OTHER | Age: 81
End: 2021-10-07
Payer: MEDICARE

## 2021-10-07 VITALS — DIASTOLIC BLOOD PRESSURE: 56 MMHG | HEART RATE: 86 BPM | SYSTOLIC BLOOD PRESSURE: 110 MMHG

## 2021-10-07 DIAGNOSIS — G89.4 CHRONIC PAIN SYNDROME: Primary | ICD-10-CM

## 2021-10-07 DIAGNOSIS — S06.0X9D CONCUSSION WITH LOSS OF CONSCIOUSNESS, SUBSEQUENT ENCOUNTER: ICD-10-CM

## 2021-10-07 DIAGNOSIS — F43.23 ADJUSTMENT DISORDER WITH MIXED ANXIETY AND DEPRESSED MOOD: Primary | ICD-10-CM

## 2021-10-07 PROCEDURE — 99215 OFFICE O/P EST HI 40 MIN: CPT | Performed by: ANESTHESIOLOGY

## 2021-10-07 PROCEDURE — G0463 HOSPITAL OUTPT CLINIC VISIT: HCPCS

## 2021-10-07 PROCEDURE — 90834 PSYTX W PT 45 MINUTES: CPT | Mod: 95 | Performed by: PSYCHOLOGIST

## 2021-10-07 RX ORDER — METHYLPHENIDATE HYDROCHLORIDE 10 MG/1
TABLET ORAL
Qty: 60 TABLET | Refills: 0 | Status: SHIPPED | OUTPATIENT
Start: 2021-10-07 | End: 2021-11-04

## 2021-10-07 RX ORDER — NORTRIPTYLINE HCL 10 MG
CAPSULE ORAL
Qty: 30 CAPSULE | Refills: 0 | Status: SHIPPED | OUTPATIENT
Start: 2021-10-07 | End: 2021-10-18

## 2021-10-07 RX ORDER — FENTANYL 62.5 UG/H
1 PATCH, EXTENDED RELEASE TRANSDERMAL
Qty: 15 PATCH | Refills: 0 | Status: SHIPPED | OUTPATIENT
Start: 2021-10-07 | End: 2021-11-04

## 2021-10-07 ASSESSMENT — PAIN SCALES - GENERAL: PAINLEVEL: MODERATE PAIN (5)

## 2021-10-07 NOTE — PROGRESS NOTES
Psychology Progress Note    Date: October 7, 2021    Time length and type of treatment: 41 minutes (2:15 PM to 2:56 PM), individual therapy    After review of the patient's situation, this visit was changed from an in-person visit to a  video visit via [employer] to reduce the risk of COVID 19 exposure. Patient was informed that policies and procedures that govern in-person sessions would also apply to  video sessions. Patient was also informed that  video sessions would be discontinued when COVID 19 exposure is no longer a concern (as determined by [facility]).     Patient location: Patient home in [place], [place]  Provider location:  [facility], [place], [place]    Patient was in agreement with proceeding with a  video session.      Necessity: This session is necessary to address the patient's adjustment disorder and PTSD.  Today we focus on the patient's treatment plan, specifically exploring processing grief.  The reader is invited to review the patient's full treatment plan in the Media section of the patient's Epic medical record.    Psychotherapeutic Technique: This writer utilized motivational interviewing, active listening, reassurance and support in the context of cognitive behavioral therapy to address the above.      MENTAL STATUS EVALUATION  Grooming: Within normal limits  Attire: Appropriate  Age: Appears Stated  Behavior Towards Examiner: Cooperative  Motor Activity: Within normal limits  Eye Contact: Appropriate  Mood: Anxious   Affect: full range  Speech/Language: normal  Attention: Adequate  Concentration: Sufficient  Thought Process: unremarkable  Thought Content: Clear    Orientation: Appeared oriented to person, place, and time, though not formally established  Memory: No evidence of impairment.  Judgement: Adequate  Estimated Intelligence: Average  Demonstrated Insight: Adequate  Fund of Knowledge: Adequate    Intervention:   Patient discussed some of the  challenges of aging, especially struggling with her physical health, pain, healing more slowly, and losing friends and classmates.  She was provided supportive psychotherapy and we discussed changes patient can make to support well being.  Patient has been working to increase her physical activity and reported this has been helping improve mood. This positive change was validated and reinforced.  Patient also explored the possibility of getting a pet to further support positive mood.       Progress:  Patient has shown improvement in ability to utilize coping skills.     Plan:   We will meet again in 1 week to address the patient's anxiety, depressed mood, and PTSD.  Estimated duration of treatment is 10+ individual therapy sessions (15530) at weekly intervals. Treatment is expected to be completed by September 2022.     Diagnosis:  Adjustment Disorder with mixed anxiety and depressed mood  Posttraumatic Stress Disorder  Attention-Deficit/Hyperactivity Disorder, combined presentation

## 2021-10-07 NOTE — PROGRESS NOTES
Pain score: 6  Constant or intermittent  What does your pain feel like: 1  Does the pain interfere with:  Work: 0  Walking/distance: 1  Sleep: 1  Daily activities: 1  Relationships/social life: 1  Mood: 1  F= 6    Patient is here to discuss medication management today with Dr. Lynn.  Discuss fentanyl patch dosing as appropriate, currently 75 mcg, changed patch today.

## 2021-10-07 NOTE — LETTER
10/7/2021         RE: Sandy Spencer  [address]  [place] [address]        Dear Colleague,    Thank you for referring your patient, Sandy Spencer, to the [facility]. Please see a copy of my visit note below.    Psychology Progress Note    Date: October 7, 2021    Time length and type of treatment: 41 minutes (2:15 PM to 2:56 PM), individual therapy    After review of the patient's situation, this visit was changed from an in-person visit to a  video visit via [employer] to reduce the risk of COVID 19 exposure. Patient was informed that policies and procedures that govern in-person sessions would also apply to  video sessions. Patient was also informed that  video sessions would be discontinued when COVID 19 exposure is no longer a concern (as determined by [facility]).     Patient location: Patient home in [place], [place]  Provider location:  [facility], [place], [place]    Patient was in agreement with proceeding with a  video session.      Necessity: This session is necessary to address the patient's adjustment disorder and PTSD.  Today we focus on the patient's treatment plan, specifically exploring processing grief.  The reader is invited to review the patient's full treatment plan in the Media section of the patient's Epic medical record.    Psychotherapeutic Technique: This writer utilized motivational interviewing, active listening, reassurance and support in the context of cognitive behavioral therapy to address the above.      MENTAL STATUS EVALUATION  Grooming: Within normal limits  Attire: Appropriate  Age: Appears Stated  Behavior Towards Examiner: Cooperative  Motor Activity: Within normal limits  Eye Contact: Appropriate  Mood: Anxious   Affect: full range  Speech/Language: normal  Attention: Adequate  Concentration: Sufficient  Thought Process: unremarkable  Thought Content: Clear    Orientation: Appeared oriented  to person, place, and time, though not formally established  Memory: No evidence of impairment.  Judgement: Adequate  Estimated Intelligence: Average  Demonstrated Insight: Adequate  Fund of Knowledge: Adequate    Intervention:   Patient discussed some of the challenges of aging, especially struggling with her physical health, pain, healing more slowly, and losing friends and classmates.  She was provided supportive psychotherapy and we discussed changes patient can make to support well being.  Patient has been working to increase her physical activity and reported this has been helping improve mood. This positive change was validated and reinforced.  Patient also explored the possibility of getting a pet to further support positive mood.       Progress:  Patient has shown improvement in ability to utilize coping skills.     Plan:   We will meet again in 1 week to address the patient's anxiety, depressed mood, and PTSD.  Estimated duration of treatment is 10+ individual therapy sessions (55032) at weekly intervals. Treatment is expected to be completed by September 2022.     Diagnosis:  Adjustment Disorder with mixed anxiety and depressed mood  Posttraumatic Stress Disorder  Attention-Deficit/Hyperactivity Disorder, combined presentation      Again, thank you for allowing me to participate in the care of your patient.        Sincerely,        Deann Meeks Psy.D, LP

## 2021-10-07 NOTE — PATIENT INSTRUCTIONS
PLAN:  Change your nortriptyline to 50 mg tablets 1 at bedtime for 5 nights  Then use the nortriptyline 10 mg prescription 3 at bedtime for 5 nights 2 at bedtime for 5 nights 1 5 nights and stop.  Ritalin 10 mg 1 in the morning and 1 at noon.    Change your fentanyl to 62.5 mcg patch changed every 48 hours.    You will contact the [facility] clinic to establish with a new neurologist to address your concern about drinking your left leg.    Continue working with your psychotherapist, can monitor your course on the Ritalin.    Follow-up with Dr. Lynn in the office in 5 to 6 weeks
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
## 2021-10-08 ENCOUNTER — TELEPHONE (OUTPATIENT)
Dept: PALLIATIVE MEDICINE | Facility: OTHER | Age: 81
End: 2021-10-08

## 2021-10-08 NOTE — TELEPHONE ENCOUNTER
PA Initiation 10/8/2021    Medication: methylphenidate hcl 10 mg tab are non formulary  Insurance Company:  Medicare/Commercial   Ph[facility] the [facility]  Filling Pharmacy Phone:  [phone]  Filling Pharmacy Fax:  [phone]  Start Date:  10/7/2021     yes

## 2021-10-11 ENCOUNTER — DOCUMENTATION ONLY (OUTPATIENT)
Dept: PHYSICAL MEDICINE AND REHAB | Facility: CLINIC | Age: 81
End: 2021-10-11

## 2021-10-11 NOTE — PROGRESS NOTES
Speech therapy referral was cancelled by patient - will call back when interested.  Follow-up with PCP and Dr. Fall as needed.    Eddie Aguirre

## 2021-10-13 ENCOUNTER — HOSPITAL ENCOUNTER (OUTPATIENT)
Dept: PHYSICAL THERAPY | Facility: REHABILITATION | Age: 81
End: 2021-10-13
Payer: MEDICARE

## 2021-10-13 ENCOUNTER — TELEPHONE (OUTPATIENT)
Dept: PALLIATIVE MEDICINE | Facility: OTHER | Age: 81
End: 2021-10-13

## 2021-10-13 DIAGNOSIS — M79.18 MYOFASCIAL PAIN: ICD-10-CM

## 2021-10-13 DIAGNOSIS — G90.523 COMPLEX REGIONAL PAIN SYNDROME TYPE 1 OF BOTH LOWER EXTREMITIES: Primary | ICD-10-CM

## 2021-10-13 DIAGNOSIS — G89.4 CHRONIC PAIN SYNDROME: ICD-10-CM

## 2021-10-13 PROCEDURE — 97140 MANUAL THERAPY 1/> REGIONS: CPT | Mod: GP | Performed by: PHYSICAL THERAPIST

## 2021-10-13 NOTE — TELEPHONE ENCOUNTER
"Patient leaves a message stating requesting to be able to take Ketamine for the breakthrough withdrawal symptoms due to withdrawals from the Fentanyl.    RN returned call and patient and patient states that she has increased agitation issues as well as increased pain     Patient states 2 weeks ago, Dr. Lynn recommended patient to go have her wound \"puncture\" looked at as the wound was still open after hitting head on corner of cupboards.  Patient states the wound bleeds every time she washed her hair.     Wound clinic at [facility] would not take her and has no way to get to [facility] so wound has not been looked at.    RN recommended she be seen and evaluated, patient states she feels something is wrong and cannot be seen.  Patient is requesting to take Ketamine \"while having withdrawals from Fentanyl\"  Patient also states that her daughter is unable to take her anywhere because she is in [place] with her \"father that is dying\"    Dr. Lynn, please advise.  "

## 2021-10-13 NOTE — TELEPHONE ENCOUNTER
Central Prior Authorization Team   Phone: [phone]      PA Initiation    Medication: methylphenidate hcl 10 mg tab - INITIATED  Insurance Company: [employer] - Phone [phone] Fax [phone]  Pharmacy Filling the Rx: [address] [place] [address]  Filling Pharmacy Phone: [phone]  Filling Pharmacy Fax: [phone]  Start Date: 10/13/2021

## 2021-10-13 NOTE — PROGRESS NOTES
Rehabilitation Services      OUTPATIENT PHYSICAL THERAPY  PLAN OF TREATMENT FOR OUTPATIENT REHABILITATION    Patient's Last Name, First Name, M.I.                YOB: 1940  SpencerViraSandy  A                        Provider's Name  ANDREY MERAZ, PT Medical Record No.  [id number]                               Onset Date: 7/13/18   Start of Care Date: 7/13/18   Type:     _X_PT   ___OT   ___SLP Medical Diagnosis: CRPS, chronic pain                       PT Diagnosis: chronic pain, myofascial pain, CRPS      _________________________________________________________________________________  Plan of Treatment:    Frequency/Duration: 1x/week     Goals:  Goal Identifier     Goal Description Patient will have improved quality of sleep/pain and waking times in the night.  8 weeks   Target Date     Date Met      Progress (detail required for progress note): This issue has improved since her right wrist fracture.  Pain from this issue which would be more difficult.  She is now experiencing awakening from her chronic pain condition     Goal Identifier     Goal Description Patient will be able to walk 30 minutes on the an incline surfaces for 1/2 mile with less pain and difficulty for community mobility exercise, recreation in 12 weeks   Target Date     Date Met      Progress (detail required for progress note): With the left wrist pain last evaluation she has no venturing or incongruity more often in improving her tolerance for this activity.     Goal Identifier     Goal Description Patient will transfer sit to stand, supine to sit floor to stand for toileting, bed mobility and chair transfers with decreased pain 1-2/10 level of intensity 4 weeks   Target Date     Date Met      Progress (detail required for progress note): Patient is improving at this activity however her impaired hearing contributes to some difficulty in  orientation and performance of these activities.     Goal Identifier     Goal Description     Target Date     Date Met      Progress (detail required for progress note):       Goal Identifier     Goal Description     Target Date     Date Met      Progress (detail required for progress note):       Goal Identifier     Goal Description     Target Date     Date Met      Progress (detail required for progress note):       Goal Identifier     Goal Description     Target Date     Date Met      Progress (detail required for progress note):       Goal Identifier     Goal Description     Target Date     Date Met      Progress (detail required for progress note):             Certification date from 10/2/21 to 1/1/22.    ANDREY MERAZ, PT          I CERTIFY THE NEED FOR THESE SERVICES FURNISHED UNDER        THIS PLAN OF TREATMENT AND WHILE UNDER MY CARE     (Physician co-signature of this document indicates review and certification of the therapy plan).                Referring Provider: Michael Ramirez, DO

## 2021-10-13 NOTE — TELEPHONE ENCOUNTER
Central Prior Authorization Team   Phone: [phone]      PA started; waiting for insurance to respond with clinical questions.

## 2021-10-13 NOTE — TELEPHONE ENCOUNTER
Dr. Lynn and RN had a face to face conversation regarding the concerns around the Ketamine.  It was decided that due to Hx and documentation surrounded the Ketamine RN will call back and let the patient know that the Ketamine will not be approved at this time and if she would like we can send in withdrawal medication, Hydroxyzine.    RN returned call to patient and patient confirmed understanding that Dr. Lynn DOES NOT want her to take the Ketamine due to past discussions regarding hallucinations and adverse reactions.  Patient declines Hydroxyzine at this time, but also states if she changes her mind she will give a call back.

## 2021-10-13 NOTE — TELEPHONE ENCOUNTER
"Patient confirms understanding, but requests RN to give daughterPema a call because when she had stopped taking the medication she gave it to daughter, \"per Dr. Lynn's approval\" so she has it.  Patient states her daughter would like to hear from someone from the clinic that it is ok to take and the  then would bring it over tonight since he is already coming over for dinner.         After review, prior to calling daughter would like Dr. Lynn, to review further and provide strength and frequency of dose as this has not been ordered in over 2 years.    Notes from chart regarding Ketamine:  10/16/2019   Anesthesia Preprocedure Evaluation by Evi Lorenzo MD  Planned anesthetic: general LMA  - LMA 4  - Avoid Ketamine - intense hallucinations w/ prior anesthetic   - Decadron 10, Zofran 4 for antiemesis.   Induction: intravenous     9/19/21   Progress Notes by Nikunj Lynn MD  We discussed other treatments for her chronic regional pain syndrome. We had reviewed ketamine in the past. She is alert and more recently that her daughter had adverse effects to ketamine, as did her grandson after an ankle fracture.     "

## 2021-10-14 ENCOUNTER — VIRTUAL VISIT (OUTPATIENT)
Dept: NEUROLOGY | Facility: CLINIC | Age: 81
End: 2021-10-14
Payer: MEDICARE

## 2021-10-14 ENCOUNTER — TELEPHONE (OUTPATIENT)
Dept: WOUND CARE | Facility: CLINIC | Age: 81
End: 2021-10-14

## 2021-10-14 DIAGNOSIS — F43.23 ADJUSTMENT DISORDER WITH MIXED ANXIETY AND DEPRESSED MOOD: Primary | ICD-10-CM

## 2021-10-14 PROCEDURE — 90834 PSYTX W PT 45 MINUTES: CPT | Mod: 95 | Performed by: PSYCHOLOGIST
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
## 2021-10-14 NOTE — TELEPHONE ENCOUNTER
Kerry has had a head wound for the past 9 months and would like to come to our clinic.   Her phone number is .

## 2021-10-14 NOTE — TELEPHONE ENCOUNTER
Central Prior Authorization Team   Phone: [phone]      Prior Authorization Approval    Authorization Effective Date: 10/7/2021  Authorization Expiration Date: UNTIL FURTHER NOTICE   Medication: methylphenidate hcl 10 mg tab - APPROVED  Approved Dose/Quantity: 60 FOR 30  Reference #:     Insurance Company: [employer] - Phone [phone] Fax [phone]  Expected CoPay:       CoPay Card Available:      Foundation Assistance Needed:    Which Pharmacy is filling the prescription (Not needed for infusion/clinic administered): [facility] [place] [address]  Pharmacy Notified: Yes  Patient Notified: Yes (**Instructed pharmacy to notify patient when script is ready to /ship.**)

## 2021-10-15 ENCOUNTER — TELEPHONE (OUTPATIENT)
Dept: FAMILY MEDICINE | Facility: CLINIC | Age: 81
End: 2021-10-15

## 2021-10-15 ENCOUNTER — OFFICE VISIT (OUTPATIENT)
Dept: FAMILY MEDICINE | Facility: CLINIC | Age: 81
End: 2021-10-15
Payer: MEDICARE

## 2021-10-15 VITALS
HEART RATE: 78 BPM | WEIGHT: 153 LBS | BODY MASS INDEX: 27.1 KG/M2 | DIASTOLIC BLOOD PRESSURE: 60 MMHG | SYSTOLIC BLOOD PRESSURE: 110 MMHG | OXYGEN SATURATION: 98 %

## 2021-10-15 DIAGNOSIS — I10 ESSENTIAL HYPERTENSION: Primary | ICD-10-CM

## 2021-10-15 DIAGNOSIS — N18.31 CHRONIC KIDNEY DISEASE (CKD) STAGE G3A/A1, MODERATELY DECREASED GLOMERULAR FILTRATION RATE (GFR) BETWEEN 45-59 ML/MIN/1.73 SQUARE METER AND ALBUMINURIA CREATININE RATIO LESS THAN 30 MG/G (H): ICD-10-CM

## 2021-10-15 DIAGNOSIS — Z53.9 DIAGNOSIS NOT YET DEFINED: Primary | ICD-10-CM

## 2021-10-15 DIAGNOSIS — T14.8XXA ABRASION: ICD-10-CM

## 2021-10-15 DIAGNOSIS — J44.9 CHRONIC OBSTRUCTIVE PULMONARY DISEASE, UNSPECIFIED COPD TYPE (H): ICD-10-CM

## 2021-10-15 DIAGNOSIS — I27.82 OTHER CHRONIC PULMONARY EMBOLISM WITHOUT ACUTE COR PULMONALE (H): ICD-10-CM

## 2021-10-15 DIAGNOSIS — B02.8 HERPES ZOSTER WITH COMPLICATION: ICD-10-CM

## 2021-10-15 DIAGNOSIS — G90.50 RSD (REFLEX SYMPATHETIC DYSTROPHY): ICD-10-CM

## 2021-10-15 LAB
ALBUMIN SERPL-MCNC: 3.9 G/DL (ref 3.5–5)
ALP SERPL-CCNC: 56 U/L (ref 45–120)
ALT SERPL W P-5'-P-CCNC: 14 U/L (ref 0–45)
ANION GAP SERPL CALCULATED.3IONS-SCNC: 14 MMOL/L (ref 5–18)
AST SERPL W P-5'-P-CCNC: 20 U/L (ref 0–40)
BILIRUB SERPL-MCNC: 0.5 MG/DL (ref 0–1)
BUN SERPL-MCNC: 21 MG/DL (ref 8–28)
CALCIUM SERPL-MCNC: 9.5 MG/DL (ref 8.5–10.5)
CHLORIDE BLD-SCNC: 103 MMOL/L (ref 98–107)
CO2 SERPL-SCNC: 23 MMOL/L (ref 22–31)
CREAT SERPL-MCNC: 1.36 MG/DL (ref 0.6–1.1)
ERYTHROCYTE [DISTWIDTH] IN BLOOD BY AUTOMATED COUNT: 13.1 % (ref 10–15)
GFR SERPL CREATININE-BSD FRML MDRD: 37 ML/MIN/1.73M2
GLUCOSE BLD-MCNC: 87 MG/DL (ref 70–125)
HCT VFR BLD AUTO: 36.4 % (ref 35–47)
HGB BLD-MCNC: 11.8 G/DL (ref 11.7–15.7)
MCH RBC QN AUTO: 32.2 PG (ref 26.5–33)
MCHC RBC AUTO-ENTMCNC: 32.4 G/DL (ref 31.5–36.5)
MCV RBC AUTO: 100 FL (ref 78–100)
PLATELET # BLD AUTO: 135 10E3/UL (ref 150–450)
POTASSIUM BLD-SCNC: 4 MMOL/L (ref 3.5–5)
PROT SERPL-MCNC: 6.6 G/DL (ref 6–8)
RBC # BLD AUTO: 3.66 10E6/UL (ref 3.8–5.2)
SODIUM SERPL-SCNC: 140 MMOL/L (ref 136–145)
WBC # BLD AUTO: 4.6 10E3/UL (ref 4–11)

## 2021-10-15 PROCEDURE — 85027 COMPLETE CBC AUTOMATED: CPT | Performed by: FAMILY MEDICINE

## 2021-10-15 PROCEDURE — G0008 ADMIN INFLUENZA VIRUS VAC: HCPCS | Performed by: FAMILY MEDICINE

## 2021-10-15 PROCEDURE — 80053 COMPREHEN METABOLIC PANEL: CPT | Performed by: FAMILY MEDICINE

## 2021-10-15 PROCEDURE — 99215 OFFICE O/P EST HI 40 MIN: CPT | Mod: 25 | Performed by: FAMILY MEDICINE

## 2021-10-15 PROCEDURE — 90662 IIV NO PRSV INCREASED AG IM: CPT | Performed by: FAMILY MEDICINE

## 2021-10-15 PROCEDURE — G0180 MD CERTIFICATION HHA PATIENT: HCPCS | Performed by: FAMILY MEDICINE

## 2021-10-15 PROCEDURE — 36415 COLL VENOUS BLD VENIPUNCTURE: CPT | Performed by: FAMILY MEDICINE

## 2021-10-15 NOTE — LETTER
October 18, 2021      Sandy Spencer  [address] [place] [place] [address]        Dear ,    We are writing to inform you of your test results.    Your hemoglobin is better as are your kidneys which is great news. Your electrolytes are normal as well.     Resulted Orders   CBC with platelets   Result Value Ref Range    WBC Count 4.6 4.0 - 11.0 10e3/uL    RBC Count 3.66 (L) 3.80 - 5.20 10e6/uL    Hemoglobin 11.8 11.7 - 15.7 g/dL    Hematocrit 36.4 35.0 - 47.0 %     78 - 100 fL    MCH 32.2 26.5 - 33.0 pg    MCHC 32.4 31.5 - 36.5 g/dL    RDW 13.1 10.0 - 15.0 %    Platelet Count 135 (L) 150 - 450 10e3/uL   Comprehensive metabolic panel (BMP + Alb, Alk Phos, ALT, AST, Total. Bili, TP)   Result Value Ref Range    Sodium 140 136 - 145 mmol/L    Potassium 4.0 3.5 - 5.0 mmol/L    Chloride 103 98 - 107 mmol/L    Carbon Dioxide (CO2) 23 22 - 31 mmol/L    Anion Gap 14 5 - 18 mmol/L    Urea Nitrogen 21 8 - 28 mg/dL    Creatinine 1.36 (H) 0.60 - 1.10 mg/dL    Calcium 9.5 8.5 - 10.5 mg/dL    Glucose 87 70 - 125 mg/dL    Alkaline Phosphatase 56 45 - 120 U/L    AST 20 0 - 40 U/L    ALT 14 0 - 45 U/L    Protein Total 6.6 6.0 - 8.0 g/dL    Albumin 3.9 3.5 - 5.0 g/dL    Bilirubin Total 0.5 0.0 - 1.0 mg/dL    GFR Estimate 37 (L) >60 mL/min/1.73m2      Comment:      As of July 11, 2021, eGFR is calculated by the CKD-EPI creatinine equation, without race adjustment. eGFR can be influenced by muscle mass, exercise, and diet. The reported eGFR is an estimation only and is only applicable if the renal function is stable.       If you have any questions or concerns, please call the clinic at the number listed above.       Sincerely,      Caitlyn Rees MD

## 2021-10-15 NOTE — PROGRESS NOTES
Assessment & Plan     Essential hypertension  -Blood pressure is at goal today.  She will continue on her current medications, continue watching her diet and increase exercise as tolerated.  She will follow-up in 3 to 6 months depending on how she is doing.    Abrasion  -Comparing the picture from last time she was here today it does look more shallow and appears to be healing by secondary intention.  Continue to monitor, if continuing to improve no need to follow-up with the wound clinic.  I suspect it was taking so long to improve given her difficulty leaving it alone based on location.    Chronic kidney disease (CKD) stage G3a/A1, moderately decreased glomerular filtration rate (GFR) between 45-59 mL/min/1.73 square meter and albuminuria creatinine ratio less than 30 mg/g (H)  -Kidneys have been stable, updating labs as listed below.  - CBC with platelets  - Comprehensive metabolic panel (BMP + Alb, Alk Phos, ALT, AST, Total. Bili, TP)  - CBC with platelets  - Comprehensive metabolic panel (BMP + Alb, Alk Phos, ALT, AST, Total. Bili, TP)    RSD (reflex sympathetic dystrophy)  -Seems to be under fair control, I do suspect that the color changes on her bilateral lower extremities are secondary to the RSD.  Continue to monitor for now, and if she finds that they are progressing we can consider work-up for further etiologies    Herpes zoster with complication  -Seems to be improved on the Valtrex.  Given the frequency of her outbreaks I am suspicious of an alternative diagnosis such as herpes.  I would like to see her at the onset of any outbreak, continue on scheduled daily Valtrex for now to see if we can get her symptoms to calm down.    Other chronic pulmonary embolism without acute cor pulmonale (H)  -Doing well on the Eliquis, continue on this, refill provided to the patient today.  - apixaban ANTICOAGULANT (ELIQUIS) 5 MG tablet  Dispense: 180 tablet; Refill: 3    Chronic obstructive pulmonary disease,  unspecified COPD type (H)  -Stable without symptoms.  Continue to monitor.    44 minutes spent on the date of the encounter doing chart review, history and exam, documentation and further activities per the note             Return in about 6 weeks (around 11/26/2021) for Follow up.    Caitlyn Rees MD  [facility] KWABENA Delgado is a 81 year old who presents for the following health issues  accompanied by her daughter Pema:    HPI     She is here today for follow up. She is doing well on the Eliquis and does find that she needs a new prescription.     She was put on Ritalin by her pain doctor and does feel that it is not quite a high enough dose. She does think it is starting to help some.     She hasn't heard from the wound clinic. She won't be taken at  or [place] and was told that she was going to have to go to [facility]. Was told that it would be 3 weeks out.     She does note that her color on her legs is changing. She has been rubbing on her left lateral thigh but her color has been changing. She does have some mild discoloration on the right as well. The skin is lighter mid thigh, left more than right. She does have deep aching and burning from the nerve pain. She doesn't know if it is coming off of the fentanyl and maybe some withdrawal. She is trying to keep herself busy.     She does feel that her breakout on her shingles area is better now. Is tolerating the Valtrex without issues.     She does have fungus on the left great toe.         Review of Systems   Per above      Objective    /60   Pulse 78   Wt 69.4 kg (153 lb)   SpO2 98%   BMI 27.10 kg/m    Body mass index is 27.1 kg/m .  Physical Exam   GENERAL: healthy, alert and no distress  NECK: no adenopathy, no asymmetry, masses, or scars and thyroid normal to palpation  RESP: lungs clear to auscultation - no rales, rhonchi or wheezes  CV: regular rate and rhythm, normal S1 S2, no S3 or S4, no murmur,  click or rub, no peripheral edema and peripheral pulses strong  MS: no gross musculoskeletal defects noted, no edema  SKIN: small abrasion on the top of her head appears less deep and is not oozing today, bilateral anterior thighs with lightening centrally  NEURO: mentation intact and speech normal  PSYCH: mentation appears normal, affect normal/bright and appearance well groomed

## 2021-10-15 NOTE — PROGRESS NOTES
Medication Therapy Management (MTM) Encounter    ASSESSMENT:                            Chronic pain: Continue to follow with the pain clinic and was tapered down to 62.5 mcg.  Possible that she may benefit from a change to Butrans patch in the future, but will defer to pain.    Hyperlipidemia/PAD: Patient will be seeing cardiology next month.  Appears that she used to be on Repatha and Crestor 40 mg nightly.  Unclear why it was increased to 60 mg, but since that is higher than the normal recommended dose. She has already decreased to 40 mg which we will continue and let cardiology know.    Depression/anxiety: Some improvement with starting methylphenidate. I will reach out to PCP and Dr. Lynn about a dose change. BP is normal, no insomnia, no changes in appetite. She has a history of issues with serotonergic SSRIs.  Will defer to PCP, consider referral to psychiatry.  She may benefit from Abilify for her mood.  Encouraged her to continue to follow with neurology.  Per chart review, it appears that when she was last seen at concussion clinic on 6/21/2021, her care was transitioned back to Dr. Lynn and PCP.    Hypertension/edema: Last blood pressure at goal.  We will continue to monitor as we want to avoid hypotension and we may be able to consolidate her medications in the future.     Insomnia: Patient is on a high dose of trazodone, and hopefully we can lower the dose in the future but she will start with decreasing the nortriptyline to 50 mg. After a few weeks we will then decrease to 30 mg, then 20 mg, then 10 mg and stop.     Migraine history: Patient continues to have mild migraines, she will continue to follow with neurology for the injections.  She will continue to split zonisamide twice daily. Will monitor her migraines while decreasing nortriptyline -- would be ideal to taper off due to  risk of falls, confusion, constipation, and dry mouth.  In addition, she is not tolerating sumatriptan therefore  I encouraged her to talk to neurology about other triptan alternatives.  Unclear what the expensive medicine was that she was given.      PLAN:                            1.  I will talk to Dr. Ybarra again about decreasing rosuvastatin back to 40 mg daily  2. Decrease nortriptyline to 50 mg HS   3. I will talk to PCP and Dr. Lynn about increasing methylphenidate     Follow-up: 2-week phone follow-up    SUBJECTIVE/OBJECTIVE:                          Sandy Spencer is a 81 year old female called for a follow up visit. She was referred from Dr. Rees for polypharmacy.     Reason for visit: Follow up  Medication Adherence/Access:  She does have home care through [facility] [employer] but  Tuesday she was discharged.  Notes that more recently her friend (an RN) set up her medicines for her. Certain oral medications goes right through her -- has 9 inches of her colon. Would like that considered for her medications.   She is in the donut hole now.     Chronic pain: Follows with the pain clinic.  Currently on fentanyl 62.5 mcg patch every 48 hours.  Her fentanyl was decreased from 75 mcg by Dr. Lynn after her discharge.  She was found to have two patches on right before her admission.  She admits there was a day where she answered the phone and was distracted and forgot to take the old one off.  She would like to be off the patch at one point, but her son  and now is not a good time with her pain and the weather changes. If he does not see him anymore then she reports that Dr. Rees will take over and taper down slowly.   She called on 10/13 about withdrawal and requesting ketamine but was given hydroxyzine instead. She could not afford the hydroxyzine.   Patient has Narcan at home  $300 Fentanyl for one month supply.     Hyperlipidemia/PAD: Currently taking Praluent 75 mg every 14 days and rosuvastatin 40 mg daily. She was on a total 60 mg -- I asked Dr. Ybarra about decreasing this, no response. However, Sandy decreased on her  own. Was on atorvastatin in the past and changed to rosuvastatin. Denies myalgias. Last lipids checked 9/17/2021.     Depression/anxiety: Currently taking lamotrigine 100 mg twice daily.  She met with Dr. Lynn and nortriptyline was tapered off and methylphenidate 10 mg TWICE DAILY (AM and afternoon) was started. She is no longer taking BuSpar -- she feels that contributed to her recent fall. Her mood was down with the switches in the weather. She is anxious due to all her medical issues. She is diagnosed with ADHD and PTSD. She does not have nightmares. She was on Adderall and ritalin in the past. No suicidal thoughts or panic attacks. She thinks the ritalin has helped but not enough. She is not taking a nap anymore. She is not as depressed, but when therapist brings up things she cries. She wonders about a higher dose. She is using a coupon from [employer], her insurance will not cover it.   She does not think she has started to decrease the nortriptyline yet. Has the 75 mg and 10 mg at home. See below regarding insomnia.   She does follow with a therapist.    Hypertension/edema: Currently taking furosemide, 20 mg x2 (40 mg) AM, carvedilol 12.5 mg twice daily and lisinopril 40 mg daily.  She recently moved furosemide from 20 mg twice daily to all at once in the morning. Patient does monitor BP at home. Home BP = 150/80 when she first wakes up, then goes down to 130 mmHg. Follows with nephrology.  Inside her body she feels like her skin is stretching, but told she does not have any visible signs of edema.  BP Readings from Last 3 Encounters:   10/15/21 110/60   10/07/21 110/56   10/01/21 112/56       Insomnia: Taking trazodone 100 mg 2-4 tablets nightly and nortriptyline. She takes 2 tablets at bedtime then if not helpful after about an hour she will take the other two. The weather change and her nerve pain is triggering and she is not sleeping well. Believes she is on nortriptyline 75 mg now. She was sleeping 5  hours before but was sleeping in the daytime before ritalin. Now, she is sleeping about 7 hours a night.     Migraine history: Taking Zonisamide 25 mg x2 daily AM, nortriptyline 75 mg nightly, butalbital-acetaminophen and sumatriptan 50 mg as needed.  Met with neuro on 10/11 and restarted Botox.  It was not helpful in the past, but she was open to trying it again and is aware that it can take a few months to see results.   Per chart review she is taking zonisamide instead of topiramate for migraines prevention.  She was taking zonisamide twice daily, but for some reason it was put in her pillbox as both in the morning recently.  She is seeing Normichele and getting injections every 3 month in her neck, occipital area. The shots contain a steroid and it is helpful in the beginning.  She can feel the migraine sitting there but it does not turn into a full migraine.  Nortriptyline was increased from 50 mg in July, but was to decrease down per Dr. Lynn which she has not done yet. She does have a dry mouth.  She uses butalbital very rarely, she is aware of the risk of rebound headaches.  She feels poison whenever she takes sumatriptan.  Reports that there was another medicine prescribed that was very expensive, then she went back to sumatriptan which she also uses rarely.      Today's Vitals: There were no vitals taken for this visit.  ----------------  Post Discharge Medication Reconciliation Status: discharge medications reconciled and changed, per note/orders.     Allergies/ADRs: Reviewed in chart  Past Medical History: Reviewed in chart  Tobacco: She reports that she quit smoking about 21 years ago. She has a 20.00 pack-year smoking history. She has never used smokeless tobacco.  Alcohol: Reviewed in chart      I spent 26 minutes with this patient today. All changes were made via collaborative practice agreement with Caitlyn Rees MD. A copy of the visit note was provided to the patient's primary care  provider.    The patient declined a summary of these recommendations.     Darlin Elise, Pharm.D., [facility]   Medication Therapy Management Pharmacist   [facility]     Telemedicine Visit Details  Type of service:  Telephone visit  Start Time: 1:13 PM  End Time: 1:39 PM  Originating Location (patient location): [place]  Distant Location (provider location):  [facility]     Medication Therapy Recommendations  Hyperlipidemia LDL goal <70    Current Medication: rosuvastatin (CRESTOR) 20 MG tablet (Discontinued)   Rationale: Dose too high - Dosage too high - Safety   Recommendation: Decrease Dose - Decrease rosuvastatin from 60 mg daily to 40 mg daily   Status: Accepted per CPA         Insomnia    Current Medication: nortriptyline (PAMELOR) 10 MG capsule   Rationale: Does not understand instructions - Adherence - Adherence   Recommendation: Decrease Dose   Status: Accepted - no CPA Needed

## 2021-10-15 NOTE — PATIENT INSTRUCTIONS
You can try some tea tree oil for your toe nail.     An alternative to this would be funginail, Marsha fungal treatment.

## 2021-10-15 NOTE — TELEPHONE ENCOUNTER
Consult received via phone from provider [facility] for ulcer of the Head.  Please schedule with Mandie Cummings Melin at [facility] [place] at [facility].    Routing to   wound healing scheduling.

## 2021-10-15 NOTE — TELEPHONE ENCOUNTER
Forms Request  Name of form/paperwork: [employer] FOR[employer]   Have you been seen for this request: N/A  Do we have the form: YES IN DR IGNACIO INBOX  When is form needed by: WHEN DONE  How would you like the form returned: FAX  Patient Notified form requests are processed in 3-5 business days: NO    Okay to leave a detailed message? NO    ,

## 2021-10-18 ENCOUNTER — VIRTUAL VISIT (OUTPATIENT)
Dept: PHARMACY | Facility: CLINIC | Age: 81
End: 2021-10-18
Payer: COMMERCIAL

## 2021-10-18 DIAGNOSIS — S06.0X9D CONCUSSION WITH LOSS OF CONSCIOUSNESS, SUBSEQUENT ENCOUNTER: ICD-10-CM

## 2021-10-18 DIAGNOSIS — G47.00 INSOMNIA, UNSPECIFIED TYPE: ICD-10-CM

## 2021-10-18 DIAGNOSIS — M79.18 MYOFASCIAL PAIN: ICD-10-CM

## 2021-10-18 DIAGNOSIS — G89.4 CHRONIC PAIN SYNDROME: Primary | ICD-10-CM

## 2021-10-18 DIAGNOSIS — R60.1 GENERALIZED EDEMA: ICD-10-CM

## 2021-10-18 DIAGNOSIS — E78.5 HYPERLIPIDEMIA LDL GOAL <70: ICD-10-CM

## 2021-10-18 PROCEDURE — 99606 MTMS BY PHARM EST 15 MIN: CPT | Performed by: PHARMACIST

## 2021-10-18 PROCEDURE — 99607 MTMS BY PHARM ADDL 15 MIN: CPT | Performed by: PHARMACIST

## 2021-10-18 RX ORDER — NORTRIPTYLINE HCL 10 MG
50 CAPSULE ORAL AT BEDTIME
Qty: 30 CAPSULE | Refills: 0
Start: 2021-10-18 | End: 2021-11-04

## 2021-10-19 ENCOUNTER — MEDICAL CORRESPONDENCE (OUTPATIENT)
Dept: HEALTH INFORMATION MANAGEMENT | Facility: CLINIC | Age: 81
End: 2021-10-19

## 2021-10-19 PROBLEM — F32.9 MAJOR DEPRESSION: Status: ACTIVE | Noted: 2019-09-03

## 2021-10-20 ENCOUNTER — TRANSFERRED RECORDS (OUTPATIENT)
Dept: HEALTH INFORMATION MANAGEMENT | Facility: CLINIC | Age: 81
End: 2021-10-20

## 2021-10-21 ENCOUNTER — VIRTUAL VISIT (OUTPATIENT)
Dept: NEUROLOGY | Facility: CLINIC | Age: 81
End: 2021-10-21
Payer: MEDICARE

## 2021-10-21 DIAGNOSIS — F43.23 ADJUSTMENT DISORDER WITH MIXED ANXIETY AND DEPRESSED MOOD: Primary | ICD-10-CM

## 2021-10-21 PROCEDURE — 90834 PSYTX W PT 45 MINUTES: CPT | Mod: 95 | Performed by: PSYCHOLOGIST
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
## 2021-10-28 ENCOUNTER — VIRTUAL VISIT (OUTPATIENT)
Dept: NEUROLOGY | Facility: CLINIC | Age: 81
End: 2021-10-28
Payer: MEDICARE

## 2021-10-28 DIAGNOSIS — F43.23 ADJUSTMENT DISORDER WITH MIXED ANXIETY AND DEPRESSED MOOD: Primary | ICD-10-CM

## 2021-10-28 PROCEDURE — 90834 PSYTX W PT 45 MINUTES: CPT | Mod: 95 | Performed by: PSYCHOLOGIST
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
## 2021-10-29 NOTE — PROGRESS NOTES
Medication Therapy Management (MTM) Encounter    ASSESSMENT:                            Chronic pain: Continue to follow with the pain clinic. She is ready to taper down to 50 mcg.  She has tried ketamine on her own and noticed improvement. Per chart review, it appears that ketamine was stopped because it was not effective for her and there was concern about the price. However, this time it appears to be helpful for her. She is likely using a very old bottle therefore it is unclear how this is still effective for her. Will have her talk to Dr. Lynn more about this to see if he is open to trying it again. Possible that she may benefit from a change to Butrans patch in the future, but will defer to pain.     Hyperlipidemia/PAD: Patient will be seeing cardiology in a few week.  Continue current regimen of Praluent and Crestor 40 mg nightly with closely monitoring of lipids.     Depression/anxiety: Some improvement with starting methylphenidate. She will discuss with Dr. Lynn about a dose change. BP is normal, no changes in appetite. She is taking the second dose early enough that she does not attribute it to her insomnia. She has a history of issues with serotonergic SSRIs.  Will defer to PCP, consider referral to psychiatry.  She may benefit from Abilify for her mood.  Encouraged her to continue to follow with neurology.  Per chart review, it appears that when she was last seen at concussion clinic on 6/21/2021, then her care was transitioned back to Dr. Lynn and PCP.    Insomnia: Patient is on a high dose of trazodone, and hopefully we can lower the dose in the future but she will start with decreasing the nortriptyline to 40 mg. After a few weeks we will then decrease to 30 mg, then 20 mg, then 10 mg and stop as instructed by Dr. Lynn.       PLAN:                            1. Decrease nortriptyline to 40 mg HS and continue to taper off per Dr. Lynn    Follow-up: 2-week phone  follow-up    SUBJECTIVE/OBJECTIVE:                          Sandy Spencer is a 81 year old female called for a follow up visit. She was referred from Dr. Rees for polypharmacy.     Reason for visit: Follow up since we last spoke on 10/18/2021  Medication Adherence/Access:  She used to have home care through [facility] but was discharged.  More recently her friend (an RN) has been setting up her medicines for her. Certain oral medications goes right through her -- has 9 inches of her colon. Would like that considered for her medications. She is in the donut hole now.     Chronic pain: Follows with the pain clinic. Currently on fentanyl 62.5 mcg patch every 48 hours.  Her fentanyl was decreased from 75 mcg by Dr. Lynn after her discharge when she was found to have two patches on right before her admission.  She admits there was a day where she answered the phone and was distracted and forgot to take the old one off.  She would like to be off the patch eventually and would like to continue to taper down to 50 mcg. Reports that if she does not see Dr. Lynn anymore then Dr. Rees will take over and taper down slowly. Her pain is not great right now, which she attributes to the weather.   She called on 10/13 about withdrawal and requesting ketamine but was given hydroxyzine instead. She could not afford the hydroxyzine. Today she wonders about ketamine again. Reports that before she called, she had tried ketamine that she had at home from a long time ago and it was helpful for her pain. She would like a prescription for this again. Reports that she does not remember ever hallucinating on it in the past, but if she does she believes it was due to a combination of other drugs.    Patient has Narcan at home    Hyperlipidemia/PAD: Currently taking Praluent 75 mg every 14 days and rosuvastatin 40 mg daily. She was on a total 60 mg -- I asked Dr. Ybarra about decreasing this, no response. However, Sandy decreased on her  own. Was on atorvastatin in the past and changed to rosuvastatin. Denies myalgias. Last lipids checked 9/17/2021.     Depression/anxiety: Continues lamotrigine 100 mg twice daily.  She met with Dr. Lynn and nortriptyline was tapered off and methylphenidate 10 mg TWICE DAILY (AM and afternoon) was started. She is no longer taking BuSpar -- she feels that contributed to her recent fall. She is anxious due to all her medical issues. She is diagnosed with ADHD and PTSD. She does not have nightmares. She was on Adderall and ritalin in the past. No suicidal thoughts or panic attacks. She thinks the ritalin has helped but not enough. She is back to needing a nap during the day. She would also like help with focusing.  She is using [employer] coupon at [facility].   She continues nortriptyline 50 mg HS. She has #28 capsules left of nortriptyline 10 mg and would like to taper off.     Insomnia: Taking trazodone 100 mg 2-4 tablets nightly and nortriptyline 50 mg HS. She takes 2 tablets of trazodone at bedtime then if not helpful after about an hour she will take the other two. She has tried 100 mg and it is not enough, she needs another dose in the middle of the night. The weather change and her nerve pain is triggering and she is not sleeping well. Since decreasing the nortriptyline her sleep is worse, but she would still like to continue tapering off.      Today's Vitals: There were no vitals taken for this visit.  ----------------    Allergies/ADRs: Reviewed in chart  Past Medical History: Reviewed in chart  Tobacco: She reports that she quit smoking about 21 years ago. She has a 20.00 pack-year smoking history. She has never used smokeless tobacco.  Alcohol: Reviewed in chart    I spent 26 minutes with this patient today. All changes were made via collaborative practice agreement with Caitlyn Rees MD. A copy of the visit note was provided to the patient's primary care provider.    The patient declined a summary of these  recommendations.     Darlin Elise, Pharm.D., BCACP   Medication Therapy Management Pharmacist   [facility]     Telemedicine Visit Details  Type of service:  Telephone visit  Start Time: 2:07 PM  End Time: 2:33 PM  Originating Location (patient location): Home  Distant Location (provider location):  [facility]     Medication Therapy Recommendations  Insomnia    Current Medication: nortriptyline (PAMELOR) 10 MG capsule   Rationale: Does not understand instructions - Adherence - Adherence   Recommendation: Decrease Dose   Status: Accepted per CPA

## 2021-11-01 ENCOUNTER — VIRTUAL VISIT (OUTPATIENT)
Dept: PHARMACY | Facility: CLINIC | Age: 81
End: 2021-11-01
Payer: COMMERCIAL

## 2021-11-01 DIAGNOSIS — E78.5 HYPERLIPIDEMIA LDL GOAL <70: ICD-10-CM

## 2021-11-01 DIAGNOSIS — G89.4 CHRONIC PAIN SYNDROME: Primary | ICD-10-CM

## 2021-11-01 DIAGNOSIS — G47.00 INSOMNIA, UNSPECIFIED TYPE: ICD-10-CM

## 2021-11-01 DIAGNOSIS — F06.4 ANXIETY DISORDER DUE TO MEDICAL CONDITION: ICD-10-CM

## 2021-11-01 PROCEDURE — 99607 MTMS BY PHARM ADDL 15 MIN: CPT | Performed by: PHARMACIST

## 2021-11-01 PROCEDURE — 99606 MTMS BY PHARM EST 15 MIN: CPT | Performed by: PHARMACIST

## 2021-11-02 ENCOUNTER — HOSPITAL ENCOUNTER (OUTPATIENT)
Dept: PHYSICAL THERAPY | Facility: REHABILITATION | Age: 81
End: 2021-11-02
Payer: MEDICARE

## 2021-11-02 DIAGNOSIS — G89.4 CHRONIC PAIN SYNDROME: ICD-10-CM

## 2021-11-02 DIAGNOSIS — G90.523 COMPLEX REGIONAL PAIN SYNDROME TYPE 1 OF BOTH LOWER EXTREMITIES: Primary | ICD-10-CM

## 2021-11-02 DIAGNOSIS — M79.18 MYOFASCIAL PAIN: ICD-10-CM

## 2021-11-02 PROCEDURE — 97140 MANUAL THERAPY 1/> REGIONS: CPT | Mod: GP | Performed by: PHYSICAL THERAPIST

## 2021-11-04 ENCOUNTER — TELEPHONE (OUTPATIENT)
Dept: PALLIATIVE MEDICINE | Facility: OTHER | Age: 81
End: 2021-11-04

## 2021-11-04 ENCOUNTER — OFFICE VISIT (OUTPATIENT)
Dept: PALLIATIVE MEDICINE | Facility: OTHER | Age: 81
End: 2021-11-04
Payer: MEDICARE

## 2021-11-04 VITALS — SYSTOLIC BLOOD PRESSURE: 153 MMHG | HEART RATE: 79 BPM | DIASTOLIC BLOOD PRESSURE: 92 MMHG

## 2021-11-04 DIAGNOSIS — G89.4 CHRONIC PAIN SYNDROME: ICD-10-CM

## 2021-11-04 DIAGNOSIS — S06.0X9D CONCUSSION WITH LOSS OF CONSCIOUSNESS, SUBSEQUENT ENCOUNTER: ICD-10-CM

## 2021-11-04 PROCEDURE — 99214 OFFICE O/P EST MOD 30 MIN: CPT | Performed by: ANESTHESIOLOGY

## 2021-11-04 PROCEDURE — G0463 HOSPITAL OUTPT CLINIC VISIT: HCPCS

## 2021-11-04 RX ORDER — FENTANYL 62.5 UG/H
1 PATCH, EXTENDED RELEASE TRANSDERMAL
Qty: 15 PATCH | Refills: 0 | Status: CANCELLED | OUTPATIENT
Start: 2021-11-04

## 2021-11-04 RX ORDER — NORTRIPTYLINE HCL 10 MG
CAPSULE ORAL
Qty: 30 CAPSULE | Refills: 0 | Status: SHIPPED | OUTPATIENT
Start: 2021-11-04 | End: 2021-11-30

## 2021-11-04 RX ORDER — NORTRIPTYLINE HCL 10 MG
50 CAPSULE ORAL AT BEDTIME
Qty: 30 CAPSULE | Refills: 0 | Status: SHIPPED | OUTPATIENT
Start: 2021-11-04 | End: 2021-11-04

## 2021-11-04 RX ORDER — METHYLPHENIDATE HYDROCHLORIDE 10 MG/1
TABLET ORAL
Qty: 90 TABLET | Refills: 0 | Status: SHIPPED | OUTPATIENT
Start: 2021-11-04 | End: 2021-12-23

## 2021-11-04 RX ORDER — KETAMINE HCL 100 %
POWDER (GRAM) MISCELLANEOUS
Qty: 60 G | Refills: 1 | Status: SHIPPED | OUTPATIENT
Start: 2021-11-04 | End: 2022-02-09

## 2021-11-04 RX ORDER — FENTANYL 50 UG/1
1 PATCH TRANSDERMAL
Qty: 15 PATCH | Refills: 0 | Status: SHIPPED | OUTPATIENT
Start: 2021-11-04 | End: 2021-12-23

## 2021-11-04 ASSESSMENT — PAIN SCALES - GENERAL: PAINLEVEL: MODERATE PAIN (4)

## 2021-11-04 NOTE — TELEPHONE ENCOUNTER
[facility] pharmacy calls, they are requesting clarification on the two prescriptions they received today for nortriptyline

## 2021-11-04 NOTE — TELEPHONE ENCOUNTER
Call placed to [facility] and clarified to use the current RX for nortriptyline that is in the medication list.

## 2021-11-04 NOTE — LETTER
11/4/2021         RE: Sandy Spencer  [address]  [place] [address]        Dear Colleague,    Thank you for referring your patient, Sandy Spencer, to the [facility]. Please see a copy of my visit note below.    Pain score: 4  Constant   What does your pain feel like:  Does the pain interfere with:  Work: 0  Walking/distance: 1  Sleep: 1  Daily activities: 1  Relationships/social life: 1  Mood: 1  F= 5    Patient has concerns about continued affordability of the fentanyl.  This will be coming due.  Patient would like to attempt to stop taking fentanyl.  Patient is asking about ketamine, daughter has this in her safe.  She reports this has worked in the past. Some concerns with hallucination from ketamine in the past.  Patient asking for a refill of notriptyline-appears last prescribed by primary care?    Please see visit notes from [facility] pharmacist        Faxed rx Ketamine to [facility].  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        HPI     Use her daughter cannot be today, though things are working out better with her daughter.  She has been having her family come over on Wednesday nights for dinner and in the summer would visit her daughter's house, though did not like to drive her to got dark.    We have started Ritalin for the cognitive symptoms related to traumatic brain injuries, the last visit also discussed pre-existing ED symptoms.  On the 10 mg morning and noon she feels less scattered, can focus better.  Each dose seems to last a couple of hours.  No side effects, no change in diet.    She did not make it to the wound clinic at [place] though feels the wound in her head is healing.    She did see a neurologist is put on valacyclovir for the  "shingles.  They discussed her concussion.  She did not think they addressed her leg that she is dragging.    She continues going to physical therapy, noting they are focusing on some pain around her orbits of the eyes and behind the eyes attributed to the concussion.  She continues sensitive to light.  She is working with her psychotherapist.    10 years with the fentanyl to 2.5 you would like to decrease to 50 mcg and eventually get off of it if possible, reviews is very expensive with her present insurance.  She would like to try the ketamine which we discussed in the past.  She used a couple of doses helped with pain, and family members were concerned this was a \"horse tranquilizer\" and did not continue.  We reviewed has a number of mechanism of action, she may also be able to simplify other medications such as zonisamide and lamotrigine.      Current Outpatient Medications:      fentaNYL (DURAGESIC) 50 mcg/hr 72 hr patch, Place 1 patch onto the skin every 48 hours remove old patch., Disp: 15 patch, Rfl: 0     ketamine HCl POWD, 40 mg lozenge, one-half 4 times a day, Disp: 60 g, Rfl: 1     methylphenidate (RITALIN) 10 MG tablet, One tablet morning, noon, anf 4 pm, Disp: 90 tablet, Rfl: 0     nortriptyline (PAMELOR) 10 MG capsule, Three tabs bedtime for 5 nights, two tabs bedtime 5 nights, one bedtime 5 nights and stop, Disp: 30 capsule, Rfl: 0     apixaban ANTICOAGULANT (ELIQUIS) 5 MG tablet, Take 1 tablet (5 mg) by mouth 2 times daily, Disp: 180 tablet, Rfl: 3     azelastine (ASTEPRO) 0.15 % nasal spray, Spray 2 sprays into both nostrils 2 times daily , Disp: , Rfl:      Butalbital-Acetaminophen (PHRENILIN)  MG TABS per tablet, Take 1 tablet by mouth daily as needed for headaches, Disp: 15 tablet, Rfl: 1     carvedilol (COREG) 12.5 MG tablet, Take 12.5 mg by mouth 2 times daily (with meals) , Disp: , Rfl:      furosemide (LASIX) 20 MG tablet, Take 40 mg by mouth daily , Disp: , Rfl:      lamoTRIgine " (LAMICTAL) 100 MG tablet, Take 1 tablet (100 mg) by mouth 2 times daily, Disp: 60 tablet, Rfl: 3     levothyroxine (SYNTHROID/LEVOTHROID) 50 MCG tablet, Take 50 mcg by mouth every morning, Disp: , Rfl:      lisinopril (ZESTRIL) 40 MG tablet, Take 40 mg by mouth At Bedtime , Disp: , Rfl:      naloxone (NARCAN) 4 MG/0.1ML nasal spray, Spray 1 spray (4 mg) into one nostril alternating nostrils once as needed for opioid reversal every 2-3 minutes until assistance arrives (Patient not taking: Reported on 10/15/2021), Disp: 0.2 mL, Rfl: 0     PRALUENT 75 MG/ML injectable pen, INJECT 75MG UNDER THE SKIN EVERY 14 DAYS, Disp: , Rfl:      rosuvastatin (CRESTOR) 40 MG tablet, Take 40 mg by mouth At Bedtime Take with 20 mg for total daily dose of 60 mg., Disp: , Rfl:      senna (SENNA-TIME) 8.6 MG tablet, Take 1-3 tablets by mouth daily as needed , Disp: , Rfl:      SUMAtriptan (IMITREX) 50 MG tablet, Take 50 mg by mouth at onset of headache , Disp: , Rfl:      traZODone (DESYREL) 100 MG tablet, Take 3-4 tablets (300-400 mg) by mouth At Bedtime, Disp: 360 tablet, Rfl: 1     valACYclovir (VALTREX) 1000 mg tablet, TAKE ONE TABLET BY MOUTH THREE TIMES A DAY FOR 7 DAYS, AS NEEDED FOR OUTBREAKS (Patient not taking: Reported on 10/15/2021), Disp: , Rfl:      valACYclovir (VALTREX) 1000 mg tablet, Take 1 tablet (1,000 mg) by mouth daily, Disp: 90 tablet, Rfl: 1     zonisamide (ZONEGRAN) 25 MG capsule, Take 1 capsule (25 mg) by mouth 2 times daily, Disp: 60 capsule, Rfl: 3    Is alert with a clear sensorium good eye contact appropriately groomed.  Review of Systems         Objective    BP (!) 153/92 (BP Location: Right arm, Patient Position: Sitting)   Pulse 79   There is no height or weight on file to calculate BMI.  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    We will continue to taper the fentanyl.  She had been on ketamine  20 mg dose in 2017, will adjust that and advance in increments.    She will continue taper the nortriptyline as the anticholinergic component may be having cognitive effects.    Total time more than 25 minutes           Again, thank you for allowing me to participate in the care of your patient.        Sincerely,        ARELIS FITZPATRICK MD

## 2021-11-04 NOTE — PATIENT INSTRUCTIONS
PLAN:  Ritalin 10 mg 1 morning, 1 noon, 1 afternoon, observe for benefit with focus and attention, without affecting sleep.    Change the fentanyl to 50 mcg patch every 48 hours, after 1 month may decrease to 37.5 or 25 mcg.    Dr. Lynn to clarify the ketamine dosage you had before, send in a prescription to mix pharmacy that you will take the larger strength one half at a time to save the money, may increase to 4 times a day.    With the increase in ketamine may taper and discontinue zonisamide and lamotrigine.    Continue to taper nortriptyline as discussed with the [facility] pharmacist, 30 mg bedtime for 5 nights, 20 mg for 5 nights and 10 mg for 5 nights.    You may review with the neurology clinic, concern for your left leg dragging is an issue in the future.    Continue going to physical therapy eluding to help focus with your concussion and headache.    Continue work with your psychotherapist regarding your concussion.    Follow-up with Dr. Lynn the office in 6 weeks

## 2021-11-04 NOTE — PROGRESS NOTES
Pain score: 4  Constant   What does your pain feel like:  Does the pain interfere with:  Work: 0  Walking/distance: 1  Sleep: 1  Daily activities: 1  Relationships/social life: 1  Mood: 1  F= 5    Patient has concerns about continued affordability of the fentanyl.  This will be coming due.  Patient would like to attempt to stop taking fentanyl.  Patient is asking about ketamine, daughter has this in her safe.  She reports this has worked in the past. Some concerns with hallucination from ketamine in the past.  Patient asking for a refill of notriptyline-appears last prescribed by primary care?    Please see visit notes from [facility] pharmacist

## 2021-11-07 NOTE — PROGRESS NOTES
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        HPI     Use her daughter cannot be today, though things are working out better with her daughter.  She has been having her family come over on Wednesday nights for dinner and in the summer would visit her daughter's house, though did not like to drive her to got dark.    We have started Ritalin for the cognitive symptoms related to traumatic brain injuries, the last visit also discussed pre-existing ED symptoms.  On the 10 mg morning and noon she feels less scattered, can focus better.  Each dose seems to last a couple of hours.  No side effects, no change in diet.    She did not make it to the wound clinic at [place] though feels the wound in her head is healing.    She did see a neurologist is put on valacyclovir for the shingles.  They discussed her concussion.  She did not think they addressed her leg that she is dragging.    She continues going to physical therapy, noting they are focusing on some pain around her orbits of the eyes and behind the eyes attributed to the concussion.  She continues sensitive to light.  She is working with her psychotherapist.    10 years with the fentanyl to 2.5 you would like to decrease to 50 mcg and eventually get off of it if possible, reviews is very expensive with her present insurance.  She would like to try the ketamine which we discussed in the past.  She used a couple of doses helped with pain, and family members were concerned this was a \"horse tranquilizer\" and did not continue.  We reviewed has a number of mechanism of action, she may also be able to simplify other medications such as zonisamide and lamotrigine.      Current Outpatient Medications:      fentaNYL (DURAGESIC) 50 mcg/hr 72 hr patch, Place 1 patch onto the skin every 48 " hours remove old patch., Disp: 15 patch, Rfl: 0     ketamine HCl POWD, 40 mg lozenge, one-half 4 times a day, Disp: 60 g, Rfl: 1     methylphenidate (RITALIN) 10 MG tablet, One tablet morning, noon, anf 4 pm, Disp: 90 tablet, Rfl: 0     nortriptyline (PAMELOR) 10 MG capsule, Three tabs bedtime for 5 nights, two tabs bedtime 5 nights, one bedtime 5 nights and stop, Disp: 30 capsule, Rfl: 0     apixaban ANTICOAGULANT (ELIQUIS) 5 MG tablet, Take 1 tablet (5 mg) by mouth 2 times daily, Disp: 180 tablet, Rfl: 3     azelastine (ASTEPRO) 0.15 % nasal spray, Spray 2 sprays into both nostrils 2 times daily , Disp: , Rfl:      Butalbital-Acetaminophen (PHRENILIN)  MG TABS per tablet, Take 1 tablet by mouth daily as needed for headaches, Disp: 15 tablet, Rfl: 1     carvedilol (COREG) 12.5 MG tablet, Take 12.5 mg by mouth 2 times daily (with meals) , Disp: , Rfl:      furosemide (LASIX) 20 MG tablet, Take 40 mg by mouth daily , Disp: , Rfl:      lamoTRIgine (LAMICTAL) 100 MG tablet, Take 1 tablet (100 mg) by mouth 2 times daily, Disp: 60 tablet, Rfl: 3     levothyroxine (SYNTHROID/LEVOTHROID) 50 MCG tablet, Take 50 mcg by mouth every morning, Disp: , Rfl:      lisinopril (ZESTRIL) 40 MG tablet, Take 40 mg by mouth At Bedtime , Disp: , Rfl:      naloxone (NARCAN) 4 MG/0.1ML nasal spray, Spray 1 spray (4 mg) into one nostril alternating nostrils once as needed for opioid reversal every 2-3 minutes until assistance arrives (Patient not taking: Reported on 10/15/2021), Disp: 0.2 mL, Rfl: 0     PRALUENT 75 MG/ML injectable pen, INJECT 75MG UNDER THE SKIN EVERY 14 DAYS, Disp: , Rfl:      rosuvastatin (CRESTOR) 40 MG tablet, Take 40 mg by mouth At Bedtime Take with 20 mg for total daily dose of 60 mg., Disp: , Rfl:      senna (SENNA-TIME) 8.6 MG tablet, Take 1-3 tablets by mouth daily as needed , Disp: , Rfl:      SUMAtriptan (IMITREX) 50 MG tablet, Take 50 mg by mouth at onset of headache , Disp: , Rfl:      traZODone (DESYREL)  100 MG tablet, Take 3-4 tablets (300-400 mg) by mouth At Bedtime, Disp: 360 tablet, Rfl: 1     valACYclovir (VALTREX) 1000 mg tablet, TAKE ONE TABLET BY MOUTH THREE TIMES A DAY FOR 7 DAYS, AS NEEDED FOR OUTBREAKS (Patient not taking: Reported on 10/15/2021), Disp: , Rfl:      valACYclovir (VALTREX) 1000 mg tablet, Take 1 tablet (1,000 mg) by mouth daily, Disp: 90 tablet, Rfl: 1     zonisamide (ZONEGRAN) 25 MG capsule, Take 1 capsule (25 mg) by mouth 2 times daily, Disp: 60 capsule, Rfl: 3    Is alert with a clear sensorium good eye contact appropriately groomed.  Review of Systems         Objective    BP (!) 153/92 (BP Location: Right arm, Patient Position: Sitting)   Pulse 79   There is no height or weight on file to calculate BMI.  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    We will continue to taper the fentanyl.  She had been on ketamine 20 mg dose in 2017, will adjust that and advance in increments.    She will continue taper the nortriptyline as the anticholinergic component may be having cognitive effects.    Total time more than 25 minutes

## 2021-11-11 ENCOUNTER — VIRTUAL VISIT (OUTPATIENT)
Dept: NEUROLOGY | Facility: CLINIC | Age: 81
End: 2021-11-11
Payer: MEDICARE

## 2021-11-11 ENCOUNTER — OFFICE VISIT (OUTPATIENT)
Dept: CARDIOLOGY | Facility: CLINIC | Age: 81
End: 2021-11-11
Payer: MEDICARE

## 2021-11-11 VITALS
WEIGHT: 151.6 LBS | SYSTOLIC BLOOD PRESSURE: 120 MMHG | DIASTOLIC BLOOD PRESSURE: 66 MMHG | BODY MASS INDEX: 25.88 KG/M2 | HEIGHT: 64 IN

## 2021-11-11 DIAGNOSIS — F43.10 PTSD (POST-TRAUMATIC STRESS DISORDER): Primary | ICD-10-CM

## 2021-11-11 DIAGNOSIS — I25.10 CORONARY ARTERY DISEASE INVOLVING NATIVE CORONARY ARTERY OF NATIVE HEART WITHOUT ANGINA PECTORIS: Primary | ICD-10-CM

## 2021-11-11 DIAGNOSIS — E78.5 DYSLIPIDEMIA, GOAL LDL BELOW 70: ICD-10-CM

## 2021-11-11 DIAGNOSIS — N18.31 STAGE 3A CHRONIC KIDNEY DISEASE (H): ICD-10-CM

## 2021-11-11 DIAGNOSIS — I65.23 BILATERAL CAROTID ARTERY STENOSIS: ICD-10-CM

## 2021-11-11 PROCEDURE — 99214 OFFICE O/P EST MOD 30 MIN: CPT | Performed by: INTERNAL MEDICINE

## 2021-11-11 PROCEDURE — 90834 PSYTX W PT 45 MINUTES: CPT | Mod: 95 | Performed by: PSYCHOLOGIST

## 2021-11-11 RX ORDER — ALIROCUMAB 75 MG/ML
INJECTION, SOLUTION SUBCUTANEOUS
Qty: 12 ML | Refills: 4 | Status: SHIPPED | OUTPATIENT
Start: 2021-11-11 | End: 2022-06-17

## 2021-11-11 ASSESSMENT — MIFFLIN-ST. JEOR: SCORE: 1139.71

## 2021-11-11 NOTE — LETTER
11/11/2021    Caitlyn Rees MD  [address] [facility]  [address]  [facility] [address]    RE: Sandy Spencer       Dear Colleague,    I had the pleasure of seeing Sandy Spencer in the [facility].            Assessment/Plan:   1.  Coronary artery disease: The patient's coronary CT angiogram was reported moderate to stenosis in LAD.  The patient has no chest pain or shortness of breath.        2.  Dyslipidemia: Her LDL has been controlled.    Continue Crestor 40 mg at bedtime and Praluent 75 mg SubQ every two weeks.        3.  Carotid artery stenosis status post left CEA: Recent carotid ultrasound was reported mild bilateral carotid artery stenosis less than 50%.  Continue Crestor.     4.  Pulmonary embolism on warfarin, stage III CKD and other chronic medical issues: Continue to follow-up with Dr. Rees.    Thank you for the opportunity to be involved in the care of Sandy Spencer. If you have any questions, please feel free to contact me.  I will see the patient again in 12 months and as needed.    Much or all of the text in this note was generated through the use of Dragon Dictate voice-to-text software. Errors in spelling or words which seem out of context are unintentional. Sound alike errors, in particular, may have escaped editing.       History of Present Illness:   It is my pleasure to see Sandy Spencer at the [facility] clinic for routine cardiology follow up.  Sandy Spencer is a 81 year old female with a medical history of coronary artery disease, essential hypertension, hyperlipidemia, anxiety and depression, pancreatitis, carotid artery stenosis s/p left CEA, pulmonary embolism on warfarin, status post right nephrectomy and chronic kidney disease stage III.    The patient states that she has been doing quite well from a cardiology standpoint.  She denies any chest pain, shortness of breath, palpitations, dizziness,  orthopnea, PND or leg edema.  Her blood pressure and heart rate are controlled.    Her laboratory tests in September and October 2021 are reviewed, discussed with the patient.  LDL was well controlled, renal function has been stable.    Past Medical History:     Patient Active Problem List   Diagnosis     Chronic kidney disease (CKD) stage G3a/A1, moderately decreased glomerular filtration rate (GFR) between 45-59 mL/min/1.73 square meter and albuminuria creatinine ratio less than 30 mg/g (H)     Focal glomerular sclerosis     RSD lower limb, bilateral     ADD (attention deficit disorder)     RSD upper limb, right     Osteopenia     Major depression     Hypertension     Insomnia     Mixed hyperlipidemia     Right rotator cuff tear     Cluster headaches     Lumbar radiculopathy     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     Temporomandibular joint-pain-dysfunction syndrome (TMJ)     Acquired hypothyroidism     Chronic pain syndrome     Snoring     Abdominal pain, generalized     Dermatochalasis of left eyelid     Bilateral carotid artery stenosis     Coronary artery disease involving native coronary artery of native heart without angina pectoris     Other chronic pulmonary embolism without acute cor pulmonale (H)     Opioid type dependence, continuous (H)     Hematuria     Hydronephrosis     Bladder spasms     Anxiety disorder due to medical condition     Family relationship problem     History of posttraumatic stress disorder (PTSD)     Generalized muscle weakness     Myofascial pain     Moderate major depression (H)     Elevated lipase     Abdominal bloating     Acquired absence of other specified parts of digestive tract     Ampullary stenosis     Obstruction of biliary tree     Anemia     Aphthous ulcer of ileum     Bipolar disorder, unspecified (H)     Disorder of phosphorus metabolism     Edema     Gastroesophageal reflux disease without esophagitis     Obstruction of common bile duct     Overflow  "diarrhea     History of partial colectomy     RSD (reflex sympathetic dystrophy)     Solitary left kidney     Flank pain     Hypocitraturia     Primary osteoarthritis of both knees     Iron deficiency anemia     Proteinuria     Concussion with loss of consciousness     Chronic pancreatitis, unspecified pancreatitis type (H)     Muscle weakness (generalized)     Shuffling gait     Falls frequently     Long term current use of anticoagulant therapy     COPD (chronic obstructive pulmonary disease) (H)       Past Surgical History:     Past Surgical History:   Procedure Laterality Date     APPENDECTOMY       BELPHAROPTOSIS REPAIR      bilateral     BILE DUCT STENT PLACEMENT       BIOPSY BREAST Left     benign  1970s     CAROTID ENDARTERECTOMY Left 2009     CHOLECYSTECTOMY       COLECTOMY  1978    \"subtotal\"     ERCP W/ SPHICTEROTOMY  01/03/2017    Placement of ventral pancreatic duct stent     ESOPHAGOSCOPY, GASTROSCOPY, DUODENOSCOPY (EGD), COMBINED N/A 12/14/2018    Procedure: ENDOSCOPIC ULTRASOUND;  Surgeon: Babak Merida MD;  Location: [facility];  Service: Gastroenterology     EYE SURGERY      Cataract     HC CYSTOSCOPY,INSERT URETERAL STENT Right 2/16/2019    Procedure: Cystoscopy with Retrograde and right stent exchange.;  Surgeon: Jayla De Paz MD;  Location: [facility];  Service: Urology     HYSTERECTOMY       IR INFERIOR VENACAVAGRAM  2/23/2019     IR IVC FILTER PLACEMENT  2/14/2019     IR IVC FILTER PLACEMENT  2/14/2019     IR IVC FILTER REMOVAL  10/16/2019     IR REMOVAL IVC FILTER  10/16/2019     IR VENOCAVAGRAM INFERIOR  2/23/2019     LAPAROTOMY EXPLORATORY  7/2013    after cholecystectomy     LAPAROTOMY EXPLORATORY      after cholecystectomy had surgery for \"something that was nicked\", gravely ill and in ICU for 1 month     LUMBAR LAMINECTOMY Left 8/11/2016    Procedure: LEFT L4-5 HEMILAMINECTOMY / MEDIAL FACETECTOMY & FORAMINOTOMY, RIGHT L5-S1 HEMILAMINECTOMY WITH " FACETECTOMY & FORAMINOTOMY;  Surgeon: Litzy Patel MD;  Location: [facility] OR;  Service:      NECK SURGERY  2010    neck muscle repair     NEPHROURETERECTOMY Right 2/20/2019    Procedure: ROBOTIC RIGHT NEPHROURETERECTOMY;  Surgeon: Parker Casillas MD;  Location: [facility] OR;  Service: Urology     OTHER SURGICAL HISTORY      benign breast cyst excision     PICC  2/25/2019          PICC AND MIDLINE TEAM LINE INSERTION  2/9/2019          ME CYSTOURETHROSCOPY W/IRRIG & EVAC CLOTS N/A 2/10/2019    Procedure: CYSTOSCOPY, BLADDER BIOPSY, RIGHT SIDED URETEROSCOPY WITH SELECTED CYTOLOGY, CLOT IRRIGATION;  Surgeon: Parker Casillas MD;  Location: [facility] OR;  Service: Urology     SALPINGOOPHORECTOMY Left 1969     TONSILLECTOMY  1942     TOTAL VAGINAL HYSTERECTOMY  1984       Family History:     Family History   Problem Relation Age of Onset     Heart Disease Mother      Kidney Disease Mother      Aortic aneurysm Mother      Dementia Mother      Heart Disease Father      Cerebrovascular Disease Father      Kidney Disease Father      Dementia Sister      Dementia Maternal Grandmother      Dementia Sister         Social History:    reports that she quit smoking about 21 years ago. She has a 20.00 pack-year smoking history. She has never used smokeless tobacco. She reports that she does not drink alcohol and does not use drugs.    Review of Systems:   12 systems are reviewed negative except for in HPI.    Meds:     Current Outpatient Medications:      apixaban ANTICOAGULANT (ELIQUIS) 5 MG tablet, Take 1 tablet (5 mg) by mouth 2 times daily, Disp: 180 tablet, Rfl: 3     azelastine (ASTEPRO) 0.15 % nasal spray, Spray 2 sprays into both nostrils 2 times daily , Disp: , Rfl:      Butalbital-Acetaminophen (PHRENILIN)  MG TABS per tablet, Take 1 tablet by mouth daily as needed for headaches, Disp: 15 tablet, Rfl: 1     carvedilol (COREG) 12.5 MG tablet, Take 12.5 mg by mouth 2 times daily  (with meals) , Disp: , Rfl:      fentaNYL (DURAGESIC) 50 mcg/hr 72 hr patch, Place 1 patch onto the skin every 48 hours remove old patch., Disp: 15 patch, Rfl: 0     furosemide (LASIX) 20 MG tablet, Take 40 mg by mouth daily , Disp: , Rfl:      ketamine HCl POWD, 40 mg lozenge, one-half 4 times a day, Disp: 60 g, Rfl: 1     levothyroxine (SYNTHROID/LEVOTHROID) 50 MCG tablet, Take 50 mcg by mouth every morning, Disp: , Rfl:      lisinopril (ZESTRIL) 40 MG tablet, Take 40 mg by mouth At Bedtime , Disp: , Rfl:      methylphenidate (RITALIN) 10 MG tablet, One tablet morning, noon, anf 4 pm, Disp: 90 tablet, Rfl: 0     naloxone (NARCAN) 4 MG/0.1ML nasal spray, Spray 1 spray (4 mg) into one nostril alternating nostrils once as needed for opioid reversal every 2-3 minutes until assistance arrives, Disp: 0.2 mL, Rfl: 0     nortriptyline (PAMELOR) 10 MG capsule, Three tabs bedtime for 5 nights, two tabs bedtime 5 nights, one bedtime 5 nights and stop, Disp: 30 capsule, Rfl: 0     PRALUENT 75 MG/ML injectable pen, INJECT 75MG UNDER THE SKIN EVERY 14 DAYS, Disp: 12 mL, Rfl: 4     rosuvastatin (CRESTOR) 40 MG tablet, Take 40 mg by mouth At Bedtime Take with 20 mg for total daily dose of 60 mg., Disp: , Rfl:      senna (SENNA-TIME) 8.6 MG tablet, Take 1-3 tablets by mouth daily as needed , Disp: , Rfl:      SUMAtriptan (IMITREX) 50 MG tablet, Take 50 mg by mouth at onset of headache , Disp: , Rfl:      traZODone (DESYREL) 100 MG tablet, Take 3-4 tablets (300-400 mg) by mouth At Bedtime, Disp: 360 tablet, Rfl: 1     valACYclovir (VALTREX) 1000 mg tablet, TAKE ONE TABLET BY MOUTH THREE TIMES A DAY FOR 7 DAYS, AS NEEDED FOR OUTBREAKS, Disp: , Rfl:      zonisamide (ZONEGRAN) 25 MG capsule, Take 1 capsule (25 mg) by mouth 2 times daily, Disp: 60 capsule, Rfl: 3    Allergies:   Acamprosate, Carbamazepine, Cyclobenzaprine, Pregabalin, Sulfa drugs, Zolpidem, Acetaminophen, Amiloride, Amoxicillin, Aspirin, Bupropion, Chromium, Duloxetine  "hcl, Nsaids, Other drug allergy (see comments), Oxycodone, Serotonin, Sulindac, Tolmetin, Verapamil, and Valproic acid      Objective:      Physical Exam  68.8 kg (151 lb 9.6 oz)  1.613 m (5' 3.5\")  Body mass index is 26.43 kg/m .  /66   Ht 1.613 m (5' 3.5\")   Wt 68.8 kg (151 lb 9.6 oz)   BMI 26.43 kg/m      General Appearance:   Awake, Alert, No acute distress.   HEENT:  Pupil equal and reactive to light. No scleral icterus; the mucous membranes were moist.   Neck: No cervical bruits. No JVD. No thyromegaly.     Chest: The spine was straight. The chest was symmetric.   Lungs:   Respirations unlabored; Lungs are clear to auscultation. No crackles. No wheezing.   Cardiovascular:   Regular rhythm and rate, normal first and second heart sounds with no murmurs. No rubs or gallops.    Abdomen:  Soft. No tenderness. Non-distended. Bowels sounds are present   Extremities: Equal tibial pulses. No leg edema.   Skin: No rashes or ulcers. Warm, Dry.   Musculoskeletal: No tenderness. No deformity.   Neurologic: Mood and affect are appropriate. No focal deficits.         EKG: Personally reviewed  Normal sinus rhythm   Low voltage QRS   Nonspecific T wave abnormality   Abnormal ECG   When compared with ECG of 09-FEB-2019 12:14,   Vent. rate has increased BY  32 BPM   Nonspecific T wave abnormality now evident in Lateral leads     Cardiac Imaging Studies  Nuclear stress test on 4-:     The nuclear stress test is negative for inducible myocardial ischemia or infarction.     The left ventricular ejection fraction at stress is greater than 70%.     A prior study was conducted on 5/30/2017.  This study has no change when compared with the prior study.     Carotid US on 9-:  IMPRESSION:  1.  Moderate plaque formation, velocities consistent with less than 50% stenosis in the right internal carotid artery.  2.  Mild plaque formation, velocities consistent with less than 50% stenosis in the left internal carotid " artery.  3.  Flow within the vertebral arteries is antegrade.    Lab Review   Lab Results   Component Value Date     10/15/2021    CO2 23 10/15/2021    BUN 21 10/15/2021     Lab Results   Component Value Date    WBC 4.6 10/15/2021    HGB 11.8 10/15/2021    HCT 36.4 10/15/2021     10/15/2021     10/15/2021     Lab Results   Component Value Date    CHOL 160 09/17/2021    CHOL 167 02/09/2021    CHOL 143 10/07/2020     Lab Results   Component Value Date    HDL 86 09/17/2021    HDL 85 02/09/2021    HDL 89 10/07/2020     No components found for: LDLCALC  Lab Results   Component Value Date    TRIG 79 09/17/2021    TRIG 87 02/09/2021    TRIG 47 10/07/2020     No components found for: CHOLHDL  Lab Results   Component Value Date    TROPONINI <0.01 03/15/2021     No results found for: BNP  Lab Results   Component Value Date    TSH 0.64 07/13/2021

## 2021-11-11 NOTE — PROGRESS NOTES
Assessment/Plan:   1.  Coronary artery disease: The patient's coronary CT angiogram was reported moderate to stenosis in LAD.  The patient has no chest pain or shortness of breath.        2.  Dyslipidemia: Her LDL has been controlled.    Continue Crestor 40 mg at bedtime and Praluent 75 mg SubQ every two weeks.        3.  Carotid artery stenosis status post left CEA: Recent carotid ultrasound was reported mild bilateral carotid artery stenosis less than 50%.  Continue Crestor.     4.  Pulmonary embolism on warfarin, stage III CKD and other chronic medical issues: Continue to follow-up with Dr. Rees.    Thank you for the opportunity to be involved in the care of Sandy Spencer. If you have any questions, please feel free to contact me.  I will see the patient again in 12 months and as needed.    Much or all of the text in this note was generated through the use of Dragon Dictate voice-to-text software. Errors in spelling or words which seem out of context are unintentional. Sound alike errors, in particular, may have escaped editing.       History of Present Illness:   It is my pleasure to see Sandy Spencer at the [facility] [place] [facility] for routine cardiology follow up.  Sandy Spencer is a 81 year old female with a medical history of coronary artery disease, essential hypertension, hyperlipidemia, anxiety and depression, pancreatitis, carotid artery stenosis s/p left CEA, pulmonary embolism on warfarin, status post right nephrectomy and chronic kidney disease stage III.    The patient states that she has been doing quite well from a cardiology standpoint.  She denies any chest pain, shortness of breath, palpitations, dizziness, orthopnea, PND or leg edema.  Her blood pressure and heart rate are controlled.    Her laboratory tests in September and October 2021 are reviewed, discussed with the patient.  LDL was well controlled, renal function has been stable.    Past Medical History:      Patient Active Problem List   Diagnosis     Chronic kidney disease (CKD) stage G3a/A1, moderately decreased glomerular filtration rate (GFR) between 45-59 mL/min/1.73 square meter and albuminuria creatinine ratio less than 30 mg/g (H)     Focal glomerular sclerosis     RSD lower limb, bilateral     ADD (attention deficit disorder)     RSD upper limb, right     Osteopenia     Major depression     Hypertension     Insomnia     Mixed hyperlipidemia     Right rotator cuff tear     Cluster headaches     Lumbar radiculopathy     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     Temporomandibular joint-pain-dysfunction syndrome (TMJ)     Acquired hypothyroidism     Chronic pain syndrome     Snoring     Abdominal pain, generalized     Dermatochalasis of left eyelid     Bilateral carotid artery stenosis     Coronary artery disease involving native coronary artery of native heart without angina pectoris     Other chronic pulmonary embolism without acute cor pulmonale (H)     Opioid type dependence, continuous (H)     Hematuria     Hydronephrosis     Bladder spasms     Anxiety disorder due to medical condition     Family relationship problem     History of posttraumatic stress disorder (PTSD)     Generalized muscle weakness     Myofascial pain     Moderate major depression (H)     Elevated lipase     Abdominal bloating     Acquired absence of other specified parts of digestive tract     Ampullary stenosis     Obstruction of biliary tree     Anemia     Aphthous ulcer of ileum     Bipolar disorder, unspecified (H)     Disorder of phosphorus metabolism     Edema     Gastroesophageal reflux disease without esophagitis     Obstruction of common bile duct     Overflow diarrhea     History of partial colectomy     RSD (reflex sympathetic dystrophy)     Solitary left kidney     Flank pain     Hypocitraturia     Primary osteoarthritis of both knees     Iron deficiency anemia     Proteinuria     Concussion with loss of  "consciousness     Chronic pancreatitis, unspecified pancreatitis type (H)     Muscle weakness (generalized)     Shuffling gait     Falls frequently     Long term current use of anticoagulant therapy     COPD (chronic obstructive pulmonary disease) (H)       Past Surgical History:     Past Surgical History:   Procedure Laterality Date     APPENDECTOMY       BELPHAROPTOSIS REPAIR      bilateral     BILE DUCT STENT PLACEMENT       BIOPSY BREAST Left     benign  1970s     CAROTID ENDARTERECTOMY Left 2009     CHOLECYSTECTOMY       COLECTOMY  1978    \"subtotal\"     ERCP W/ SPHICTEROTOMY  01/03/2017    Placement of ventral pancreatic duct stent     ESOPHAGOSCOPY, GASTROSCOPY, DUODENOSCOPY (EGD), COMBINED N/A 12/14/2018    Procedure: ENDOSCOPIC ULTRASOUND;  Surgeon: Babak Merida MD;  Location: [facility] [place];  Service: Gastroenterology     EYE SURGERY      Cataract     HC CYSTOSCOPY,INSERT URETERAL STENT Right 2/16/2019    Procedure: Cystoscopy with Retrograde and right stent exchange.;  Surgeon: Jayla De Paz MD;  Location: [facility] [place];  Service: Urology     HYSTERECTOMY       IR INFERIOR VENACAVAGRAM  2/23/2019     IR IVC FILTER PLACEMENT  2/14/2019     IR IVC FILTER PLACEMENT  2/14/2019     IR IVC FILTER REMOVAL  10/16/2019     IR REMOVAL IVC FILTER  10/16/2019     IR VENOCAVAGRAM INFERIOR  2/23/2019     LAPAROTOMY EXPLORATORY  7/2013    after cholecystectomy     LAPAROTOMY EXPLORATORY      after cholecystectomy had surgery for \"something that was nicked\", gravely ill and in ICU for 1 month     LUMBAR LAMINECTOMY Left 8/11/2016    Procedure: LEFT L4-5 HEMILAMINECTOMY / MEDIAL FACETECTOMY & FORAMINOTOMY, RIGHT L5-S1 HEMILAMINECTOMY WITH FACETECTOMY & FORAMINOTOMY;  Surgeon: Litzy Patel MD;  Location: [facility];  Service:      NECK SURGERY  2010    neck muscle repair     NEPHROURETERECTOMY Right 2/20/2019    Procedure: ROBOTIC RIGHT NEPHROURETERECTOMY;  Surgeon: Parker Casillas " MD Sahil;  Location: [facility] Main OR;  Service: Urology     OTHER SURGICAL HISTORY      benign breast cyst excision     PICC  2/25/2019          PICC AND MIDLINE TEAM LINE INSERTION  2/9/2019          NC CYSTOURETHROSCOPY W/IRRIG & EVAC CLOTS N/A 2/10/2019    Procedure: CYSTOSCOPY, BLADDER BIOPSY, RIGHT SIDED URETEROSCOPY WITH SELECTED CYTOLOGY, CLOT IRRIGATION;  Surgeon: Parker Casillas MD;  Location: [facility] Main OR;  Service: Urology     SALPINGOOPHORECTOMY Left 1969     TONSILLECTOMY  1942     TOTAL VAGINAL HYSTERECTOMY  1984       Family History:     Family History   Problem Relation Age of Onset     Heart Disease Mother      Kidney Disease Mother      Aortic aneurysm Mother      Dementia Mother      Heart Disease Father      Cerebrovascular Disease Father      Kidney Disease Father      Dementia Sister      Dementia Maternal Grandmother      Dementia Sister         Social History:    reports that she quit smoking about 21 years ago. She has a 20.00 pack-year smoking history. She has never used smokeless tobacco. She reports that she does not drink alcohol and does not use drugs.    Review of Systems:   12 systems are reviewed negative except for in HPI.    Meds:     Current Outpatient Medications:      apixaban ANTICOAGULANT (ELIQUIS) 5 MG tablet, Take 1 tablet (5 mg) by mouth 2 times daily, Disp: 180 tablet, Rfl: 3     azelastine (ASTEPRO) 0.15 % nasal spray, Spray 2 sprays into both nostrils 2 times daily , Disp: , Rfl:      Butalbital-Acetaminophen (PHRENILIN)  MG TABS per tablet, Take 1 tablet by mouth daily as needed for headaches, Disp: 15 tablet, Rfl: 1     carvedilol (COREG) 12.5 MG tablet, Take 12.5 mg by mouth 2 times daily (with meals) , Disp: , Rfl:      fentaNYL (DURAGESIC) 50 mcg/hr 72 hr patch, Place 1 patch onto the skin every 48 hours remove old patch., Disp: 15 patch, Rfl: 0     furosemide (LASIX) 20 MG tablet, Take 40 mg by mouth daily , Disp: , Rfl:      ketamine HCl  "POWD, 40 mg lozenge, one-half 4 times a day, Disp: 60 g, Rfl: 1     levothyroxine (SYNTHROID/LEVOTHROID) 50 MCG tablet, Take 50 mcg by mouth every morning, Disp: , Rfl:      lisinopril (ZESTRIL) 40 MG tablet, Take 40 mg by mouth At Bedtime , Disp: , Rfl:      methylphenidate (RITALIN) 10 MG tablet, One tablet morning, noon, anf 4 pm, Disp: 90 tablet, Rfl: 0     naloxone (NARCAN) 4 MG/0.1ML nasal spray, Spray 1 spray (4 mg) into one nostril alternating nostrils once as needed for opioid reversal every 2-3 minutes until assistance arrives, Disp: 0.2 mL, Rfl: 0     nortriptyline (PAMELOR) 10 MG capsule, Three tabs bedtime for 5 nights, two tabs bedtime 5 nights, one bedtime 5 nights and stop, Disp: 30 capsule, Rfl: 0     PRALUENT 75 MG/ML injectable pen, INJECT 75MG UNDER THE SKIN EVERY 14 DAYS, Disp: 12 mL, Rfl: 4     rosuvastatin (CRESTOR) 40 MG tablet, Take 40 mg by mouth At Bedtime Take with 20 mg for total daily dose of 60 mg., Disp: , Rfl:      senna (SENNA-TIME) 8.6 MG tablet, Take 1-3 tablets by mouth daily as needed , Disp: , Rfl:      SUMAtriptan (IMITREX) 50 MG tablet, Take 50 mg by mouth at onset of headache , Disp: , Rfl:      traZODone (DESYREL) 100 MG tablet, Take 3-4 tablets (300-400 mg) by mouth At Bedtime, Disp: 360 tablet, Rfl: 1     valACYclovir (VALTREX) 1000 mg tablet, TAKE ONE TABLET BY MOUTH THREE TIMES A DAY FOR 7 DAYS, AS NEEDED FOR OUTBREAKS, Disp: , Rfl:      zonisamide (ZONEGRAN) 25 MG capsule, Take 1 capsule (25 mg) by mouth 2 times daily, Disp: 60 capsule, Rfl: 3    Allergies:   Acamprosate, Carbamazepine, Cyclobenzaprine, Pregabalin, Sulfa drugs, Zolpidem, Acetaminophen, Amiloride, Amoxicillin, Aspirin, Bupropion, Chromium, Duloxetine hcl, Nsaids, Other drug allergy (see comments), Oxycodone, Serotonin, Sulindac, Tolmetin, Verapamil, and Valproic acid      Objective:      Physical Exam  68.8 kg (151 lb 9.6 oz)  1.613 m (5' 3.5\")  Body mass index is 26.43 kg/m .  /66   Ht 1.613 m " "(5' 3.5\")   Wt 68.8 kg (151 lb 9.6 oz)   BMI 26.43 kg/m      General Appearance:   Awake, Alert, No acute distress.   HEENT:  Pupil equal and reactive to light. No scleral icterus; the mucous membranes were moist.   Neck: No cervical bruits. No JVD. No thyromegaly.     Chest: The spine was straight. The chest was symmetric.   Lungs:   Respirations unlabored; Lungs are clear to auscultation. No crackles. No wheezing.   Cardiovascular:   Regular rhythm and rate, normal first and second heart sounds with no murmurs. No rubs or gallops.    Abdomen:  Soft. No tenderness. Non-distended. Bowels sounds are present   Extremities: Equal tibial pulses. No leg edema.   Skin: No rashes or ulcers. Warm, Dry.   Musculoskeletal: No tenderness. No deformity.   Neurologic: Mood and affect are appropriate. No focal deficits.         EKG: Personally reviewed  Normal sinus rhythm   Low voltage QRS   Nonspecific T wave abnormality   Abnormal ECG   When compared with ECG of 09-FEB-2019 12:14,   Vent. rate has increased BY  32 BPM   Nonspecific T wave abnormality now evident in Lateral leads     Cardiac Imaging Studies  Nuclear stress test on 4-:     The nuclear stress test is negative for inducible myocardial ischemia or infarction.     The left ventricular ejection fraction at stress is greater than 70%.     A prior study was conducted on 5/30/2017.  This study has no change when compared with the prior study.     Carotid US on 9-:  IMPRESSION:  1.  Moderate plaque formation, velocities consistent with less than 50% stenosis in the right internal carotid artery.  2.  Mild plaque formation, velocities consistent with less than 50% stenosis in the left internal carotid artery.  3.  Flow within the vertebral arteries is antegrade.    Lab Review   Lab Results   Component Value Date     10/15/2021    CO2 23 10/15/2021    BUN 21 10/15/2021     Lab Results   Component Value Date    WBC 4.6 10/15/2021    HGB 11.8 10/15/2021 "    HCT 36.4 10/15/2021     10/15/2021     10/15/2021     Lab Results   Component Value Date    CHOL 160 09/17/2021    CHOL 167 02/09/2021    CHOL 143 10/07/2020     Lab Results   Component Value Date    HDL 86 09/17/2021    HDL 85 02/09/2021    HDL 89 10/07/2020     No components found for: LDLCALC  Lab Results   Component Value Date    TRIG 79 09/17/2021    TRIG 87 02/09/2021    TRIG 47 10/07/2020     No components found for: CHOLHDL  Lab Results   Component Value Date    TROPONINI <0.01 03/15/2021     No results found for: BNP  Lab Results   Component Value Date    TSH 0.64 07/13/2021
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
## 2021-11-12 ENCOUNTER — HOSPITAL ENCOUNTER (OUTPATIENT)
Dept: PHYSICAL THERAPY | Facility: REHABILITATION | Age: 81
End: 2021-11-12
Payer: MEDICARE

## 2021-11-12 DIAGNOSIS — G89.4 CHRONIC PAIN SYNDROME: ICD-10-CM

## 2021-11-12 DIAGNOSIS — M79.18 MYOFASCIAL PAIN: ICD-10-CM

## 2021-11-12 DIAGNOSIS — G90.523 COMPLEX REGIONAL PAIN SYNDROME TYPE 1 OF BOTH LOWER EXTREMITIES: Primary | ICD-10-CM

## 2021-11-12 PROCEDURE — 97140 MANUAL THERAPY 1/> REGIONS: CPT | Mod: GP | Performed by: PHYSICAL THERAPIST

## 2021-11-12 NOTE — PROGRESS NOTES
Medication Therapy Management (MTM) Encounter    ASSESSMENT:                            Chronic pain: Continue to follow with the pain clinic. She is ready to taper down to 50 mcg, but would like to finish the 62.5 mcg first so she will alternate in order to lower her dose. I did recommend the next step down from 50 mcg to be 37.5 mcg. She will ketamine on her own as needed and I encouraged her to double check the strength on file. She has already stopped lamotrigine- ideally she would have tapered, but she has already stopped it. She will continue zonisamide once daily AM but at follow up with can discontinue especially as she is on the ketamine.     Hyperlipidemia/PAD:  Continue current regimen of Praluent and Crestor 40 mg nightly with closely monitoring of lipids.     Depression/anxiety: Some improvement with starting methylphenidate, but no significant improvement with three times daily dosing. She will try taking two in the morning and one in the afternoon to see if that works better for her.  BP is normal, no changes in appetite. She is taking the second dose early enough that she does not attribute it to her insomnia. She has a history of issues with serotonergic SSRIs.  Will defer to PCP, consider referral to psychiatry.  She may benefit from Abilify for her mood.  Encouraged her to continue to follow with neurology.  Per chart review, it appears that when she was last seen at concussion clinic on 6/21/2021, then her care was transitioned back to Dr. Lynn and PCP.     Insomnia: Patient is on a high dose of trazodone,but with the decrease in the nortriptyline she may need to take 300-400 mg HS. She will continue the taper and decrease to 20 mg, then 10 mg and stop as instructed by Dr. Lynn.     Follow-up: 4-week phone follow-up    SUBJECTIVE/OBJECTIVE:                          Sandy Spencer is a 81 year old female called for a follow up visit. She was referred from Dr. Rees for polypharmacy.      Reason for visit: Follow up since we last spoke on 11/1/2021  Medication Adherence/Access:  She used to have home care through [facility] but was discharged.  More recently her friend (an RN) has been setting up her medicines for her. Certain oral medications goes right through her -- has 9 inches of her colon. Would like that considered for her medications. She is in the donut hole now.     Chronic pain: Follows with the pain clinic. Currently on fentanyl 62.5 mcg patch every 48 hours.   Fentanyl was decreased from 62.5 mcg (previously was on 75 mcg) to 50 mcg on 11/4/2021, but she has not started yet.  She has about 6 patches left at home and she paid $300 so she would like to continue.  Ketamine 40 mg lozenge half (20 mg) four times daily started on 11/4/21, but she has not started yet. She plans on using for extreme pain. She thinks she has the 20 mg at home however.  She had tried ketamine that she had at home from a long time ago and it was helpful for her pain.   It was recommended that with the start of ketamine, she can taper and discontinue zonisamide and lamotrigine. She did stop the lamotrigine, no taper. She is not sure if she feels any different. She continues the zonisamide but has decreased to once daily.   She would like to be off the patch eventually and would like to continue to taper off. She wonders about decreasing next to 25 mcg. Reports that if she does not see Dr. Lynn anymore then Dr. Rees will take over and taper down slowly. Her pain is not great right now, which she attributes to the changes in weather.   She could not afford the hydroxyzine.   Patient has Narcan at home    Hyperlipidemia/PAD: Currently taking Praluent 75 mg every 14 days and rosuvastatin 40 mg daily. She was on a total 60 mg -- I asked Dr. Ybarra about decreasing this, no response. However, Sandy decreased on her own. Was on atorvastatin in the past and changed to rosuvastatin. Denies myalgias. She saw Dr. Ybarra on  11/11. Last lipids checked 9/17/2021.     Depression/anxiety: She met with Dr. Lynn and nortriptyline was tapered down and methylphenidate 10 mg increased to three times daily (7am, 12pm, 3pm) was increased from twice daily on 11/4/2021.    She feels more agitated off lamotrigine, but thinks it is the change in weather.   She is no longer taking BuSpar -- she feels that contributed to her recent fall. She is anxious due to all her medical issues. She is diagnosed with ADHD and PTSD. She does not have nightmares. She was on Adderall and ritalin in the past. No suicidal thoughts or panic attacks.  She finds the Ritalin helpful and is not having naps in the middle of the day anymore, but does think she needs more help with focusing.    She is using goodrx coupon at [facility].   She continues nortriptyline 30 mg HS. She would like to taper off.   BP Readings from Last 3 Encounters:   11/11/21 120/66   11/04/21 (!) 153/92   10/15/21 110/60       Insomnia: Taking trazodone 100 mg 2-4 tablets nightly and nortriptyline 30 mg HS. She takes 2 tablets of trazodone at bedtime then if not helpful after about an hour she will take the other two. She has tried 100 mg and it is not enough, she needs another dose in the middle of the night. The weather change and her nerve pain is triggering and she is not sleeping well, also with being on less nortriptyline.       Today's Vitals: There were no vitals taken for this visit.  ----------------    Allergies/ADRs: Reviewed in chart  Past Medical History: Reviewed in chart  Tobacco: She reports that she quit smoking about 21 years ago. She has a 20.00 pack-year smoking history. She has never used smokeless tobacco.  Alcohol: Reviewed in chart    I spent 21 minutes with this patient today. All changes were made via collaborative practice agreement with Caitlyn Rees MD. A copy of the visit note was provided to the patient's primary care provider.    The patient declined a summary of  these recommendations.     Darlin Elise, Pharm.D., BCACP   Medication Therapy Management Pharmacist   [facility]     Telemedicine Visit Details  Type of service:  Telephone visit  Start Time: 11:38 AM  End Time: 11:59 AM  Originating Location (patient location): Home  Distant Location (provider location):  [facility]     Medication Therapy Recommendations  No medication therapy recommendations to display

## 2021-11-15 ENCOUNTER — VIRTUAL VISIT (OUTPATIENT)
Dept: PHARMACY | Facility: CLINIC | Age: 81
End: 2021-11-15
Payer: COMMERCIAL

## 2021-11-15 DIAGNOSIS — F06.4 ANXIETY DISORDER DUE TO MEDICAL CONDITION: ICD-10-CM

## 2021-11-15 DIAGNOSIS — G47.00 INSOMNIA, UNSPECIFIED TYPE: ICD-10-CM

## 2021-11-15 DIAGNOSIS — E78.5 HYPERLIPIDEMIA LDL GOAL <70: Primary | ICD-10-CM

## 2021-11-15 DIAGNOSIS — G89.4 CHRONIC PAIN SYNDROME: ICD-10-CM

## 2021-11-15 PROCEDURE — 99607 MTMS BY PHARM ADDL 15 MIN: CPT | Performed by: PHARMACIST

## 2021-11-15 PROCEDURE — 99606 MTMS BY PHARM EST 15 MIN: CPT | Performed by: PHARMACIST

## 2021-11-15 RX ORDER — ZONISAMIDE 25 MG/1
25 CAPSULE ORAL DAILY
Qty: 60 CAPSULE | Refills: 3
Start: 2021-11-15 | End: 2021-12-06

## 2021-11-30 ENCOUNTER — HOSPITAL ENCOUNTER (OUTPATIENT)
Dept: PHYSICAL THERAPY | Facility: REHABILITATION | Age: 81
End: 2021-11-30
Payer: MEDICARE

## 2021-11-30 ENCOUNTER — OFFICE VISIT (OUTPATIENT)
Dept: FAMILY MEDICINE | Facility: CLINIC | Age: 81
End: 2021-11-30
Payer: MEDICARE

## 2021-11-30 VITALS
HEART RATE: 72 BPM | DIASTOLIC BLOOD PRESSURE: 68 MMHG | BODY MASS INDEX: 26.73 KG/M2 | SYSTOLIC BLOOD PRESSURE: 110 MMHG | WEIGHT: 153.3 LBS

## 2021-11-30 DIAGNOSIS — T14.8XXA NONHEALING NONSURGICAL WOUND: ICD-10-CM

## 2021-11-30 DIAGNOSIS — M54.2 NECK PAIN: ICD-10-CM

## 2021-11-30 DIAGNOSIS — G89.4 CHRONIC PAIN SYNDROME: ICD-10-CM

## 2021-11-30 DIAGNOSIS — N18.31 CHRONIC KIDNEY DISEASE (CKD) STAGE G3A/A1, MODERATELY DECREASED GLOMERULAR FILTRATION RATE (GFR) BETWEEN 45-59 ML/MIN/1.73 SQUARE METER AND ALBUMINURIA CREATININE RATIO LESS THAN 30 MG/G (H): ICD-10-CM

## 2021-11-30 DIAGNOSIS — G90.523 COMPLEX REGIONAL PAIN SYNDROME TYPE 1 OF BOTH LOWER EXTREMITIES: Primary | ICD-10-CM

## 2021-11-30 DIAGNOSIS — G89.4 CHRONIC PAIN SYNDROME: Primary | ICD-10-CM

## 2021-11-30 DIAGNOSIS — Z86.16 HISTORY OF 2019 NOVEL CORONAVIRUS DISEASE (COVID-19): ICD-10-CM

## 2021-11-30 DIAGNOSIS — M79.18 MYOFASCIAL PAIN: ICD-10-CM

## 2021-11-30 DIAGNOSIS — G47.00 INSOMNIA, UNSPECIFIED TYPE: ICD-10-CM

## 2021-11-30 DIAGNOSIS — I10 ESSENTIAL HYPERTENSION: ICD-10-CM

## 2021-11-30 LAB
ERYTHROCYTE [DISTWIDTH] IN BLOOD BY AUTOMATED COUNT: 14.7 % (ref 10–15)
HCT VFR BLD AUTO: 34.9 % (ref 35–47)
HGB BLD-MCNC: 11.7 G/DL (ref 11.7–15.7)
MCH RBC QN AUTO: 33.3 PG (ref 26.5–33)
MCHC RBC AUTO-ENTMCNC: 33.5 G/DL (ref 31.5–36.5)
MCV RBC AUTO: 99 FL (ref 78–100)
PLATELET # BLD AUTO: 147 10E3/UL (ref 150–450)
RBC # BLD AUTO: 3.51 10E6/UL (ref 3.8–5.2)
WBC # BLD AUTO: 4.8 10E3/UL (ref 4–11)

## 2021-11-30 PROCEDURE — 85027 COMPLETE CBC AUTOMATED: CPT | Performed by: FAMILY MEDICINE

## 2021-11-30 PROCEDURE — 80053 COMPREHEN METABOLIC PANEL: CPT | Performed by: FAMILY MEDICINE

## 2021-11-30 PROCEDURE — 36415 COLL VENOUS BLD VENIPUNCTURE: CPT | Performed by: FAMILY MEDICINE

## 2021-11-30 PROCEDURE — 86769 SARS-COV-2 COVID-19 ANTIBODY: CPT | Mod: 90 | Performed by: FAMILY MEDICINE

## 2021-11-30 PROCEDURE — 99000 SPECIMEN HANDLING OFFICE-LAB: CPT | Performed by: FAMILY MEDICINE

## 2021-11-30 PROCEDURE — 97140 MANUAL THERAPY 1/> REGIONS: CPT | Mod: GP | Performed by: PHYSICAL THERAPIST

## 2021-11-30 PROCEDURE — 99214 OFFICE O/P EST MOD 30 MIN: CPT | Performed by: FAMILY MEDICINE

## 2021-11-30 RX ORDER — FUROSEMIDE 40 MG
40 TABLET ORAL DAILY
Qty: 90 TABLET | Refills: 1 | Status: SHIPPED | OUTPATIENT
Start: 2021-11-30 | End: 2022-02-02

## 2021-11-30 NOTE — LETTER
December 6, 2021      Sandy Spencer  [address] [place] [address]        Dear ,    We are writing to inform you of your test results.    You do still have antibodies to covid which is great news.     Your kidneys are stable, hemoglobin continues to be normal and electrolytes are normal.     Resulted Orders   Comprehensive metabolic panel (BMP + Alb, Alk Phos, ALT, AST, Total. Bili, TP)   Result Value Ref Range    Sodium 140 136 - 145 mmol/L    Potassium 3.7 3.5 - 5.0 mmol/L    Chloride 102 98 - 107 mmol/L    Carbon Dioxide (CO2) 24 22 - 31 mmol/L    Anion Gap 14 5 - 18 mmol/L    Urea Nitrogen 28 8 - 28 mg/dL    Creatinine 1.63 (H) 0.60 - 1.10 mg/dL    Calcium 9.6 8.5 - 10.5 mg/dL    Glucose 86 70 - 125 mg/dL    Alkaline Phosphatase 48 45 - 120 U/L    AST 23 0 - 40 U/L    ALT 15 0 - 45 U/L    Protein Total 6.6 6.0 - 8.0 g/dL    Albumin 4.2 3.5 - 5.0 g/dL    Bilirubin Total 0.8 0.0 - 1.0 mg/dL    GFR Estimate 30 (L) >60 mL/min/1.73m2      Comment:      As of July 11, 2021, eGFR is calculated by the CKD-EPI creatinine equation, without race adjustment. eGFR can be influenced by muscle mass, exercise, and diet. The reported eGFR is an estimation only and is only applicable if the renal function is stable.   CBC with platelets   Result Value Ref Range    WBC Count 4.8 4.0 - 11.0 10e3/uL    RBC Count 3.51 (L) 3.80 - 5.20 10e6/uL    Hemoglobin 11.7 11.7 - 15.7 g/dL    Hematocrit 34.9 (L) 35.0 - 47.0 %    MCV 99 78 - 100 fL    MCH 33.3 (H) 26.5 - 33.0 pg    MCHC 33.5 31.5 - 36.5 g/dL    RDW 14.7 10.0 - 15.0 %    Platelet Count 147 (L) 150 - 450 10e3/uL   COVID-19 Senthil RBD Carolyn & Titer Reflex   Result Value Ref Range    SARS-CoV-2 Senthil Ab, Interp, S Positive       Comment:      Antibodies to the SARS-CoV-2 spike glycoprotein   detected. These results suggest recent or prior   SARS-CoV-2 infection and/or vaccination. Antibody   levels >/= 0.80 U/mL are considered positive by this   assay. No minimum  antibody level or threshold has   been established to indicate long-term protective   immunity against re-infection. Serologic results   should not be used to diagnose recent SARS-CoV-2   infection. False-positive results for IgG antibodies   may occur due to cross-reactivity from pre-existing   antibodies or other possible causes.    SARS-CoV-2 Senthil Ab, Quant, S 154 U/mL      Comment:         -------------------ADDITIONAL INFORMATION-------------------  Testing was performed using the Roche Elecsys Anti-SARS-  CoV-2 S Reagent assay from [employer], which has  received Emergency Use Authorization (EUA) by the U.S.  Food and Drug Administration.      Fact sheets for this Emergency Use Authorization (EUA)  assay can be found at the following links:  For Healthcare Providers:  https://www.fda.gov/media/862685/download  For Patients:  https://www.fda.gov/media/190200/download    Patient's Race White     Patient's Ethnicity Not        Comment:         Test Performed by:  [facility] - [address] [address]  : Shankar Morataya M.D. Ph.D.; CLIA# [id number]       If you have any questions or concerns, please call the clinic at the number listed above.       Sincerely,      Caitlyn Rees MD

## 2021-11-30 NOTE — PROGRESS NOTES
Assessment & Plan     Chronic pain syndrome  -Continues to follow with the pain clinic. I congratulated her on her weaning process. She will discuss with them the concerns about her possible flare of her RSD    Nonhealing nonsurgical wound  -Cauterized today with silver nitrated. Will watch for worsening, but really, continues to heal, slowly, by secondary intention.     Insomnia, unspecified type  -Discussed that for now she can continue to use the trazodone but really that the medications that we have for insomnia are not made to keep people sleeping. She needs to work on movement through the day, not staying in bed when not sleeping and we opted to have her try adding in a SAD light as well for the mornings to see if this will help with her circadian rhythm.     Essential hypertension  -Doing well on the current medication, would like to transition to 40 mg pills.   - furosemide (LASIX) 40 MG tablet  Dispense: 90 tablet; Refill: 1    Neck pain  -Chronic in nature, will have her do an MRI for further evaluation.   - MR Cervical Spine w/o Contrast    History of 2019 novel coronavirus disease (COVID-19)  -Would like to see if she has signs of prior covid infection.   - COVID-19 Senthil RBD Carolyn & Titer Reflex  - COVID-19 Senthil RBD Carolyn & Titer Reflex    Chronic kidney disease (CKD) stage G3a/A1, moderately decreased glomerular filtration rate (GFR) between 45-59 mL/min/1.73 square meter and albuminuria creatinine ratio less than 30 mg/g (H)  -long standing signs of kidney disease, updating labs today  - Comprehensive metabolic panel (BMP + Alb, Alk Phos, ALT, AST, Total. Bili, TP)  - CBC with platelets  - Comprehensive metabolic panel (BMP + Alb, Alk Phos, ALT, AST, Total. Bili, TP)  - CBC with platelets              Return for Next Scheduled visit.    Caitlyn Rees MD  [facility]      Alicia Delgado is a 81 year old who presents for the following health issues     History of  Present Illness       Back Pain:  She presents for follow up of back pain. Patient's back pain is a chronic problem.  Location of back pain:  Right lower back, left lower back, right upper back, left upper back, right side of neck, left side of neck, right shoulder, left buttock, left hip and other  Description of back pain: dull ache, gnawing, sharp and other  Back pain spreads: left buttocks, right knee and left knee    Since patient first noticed back pain, pain is: gradually worsening  Does back pain interfere with her job:  Not applicable      CKD: She uses over the counter pain medication, including Tylenol, one time daily.    She eats 2-3 servings of fruits and vegetables daily.She consumes 0 sweetened beverage(s) daily.She exercises with enough effort to increase her heart rate 10 to 19 minutes per day.  She exercises with enough effort to increase her heart rate 3 or less days per week.   She is taking medications regularly.       She is here today for several issues.     She does continue to have pain over the top of her head where she hit it. She continues to have issues with the bleeding. She is gentle with combing this area. She doesn't want to get an infection in this area. She does keep the area clean as well. She wonders if there is something that I can do to help the bleeding.     She does note that she has a lot of back and neck pain. She does continue to have neck and shoulder pain thatis draining on her. She is getting Botox injections in the neck as well as other medication in her shoulders. She knows that [facility] shouldn't have surgery at her age. She has been taking apap, three a few days a week. They are not working for her. She does have ketamine that she can use in her cheek. She does want to be off of the fentanyl. She is happy to report that she is weaning down on the strength of the patch. She is anxious though that her RSD is flaring in her legs and the last time that this happened her pain  was unmanageable for some time. She notes that  The skin discoloration on the anterior thighs is spreading.     She is not sleeping. She is working on getting off of some of her medicines. She is up between 0300 and 0500 and that is it for the day. She is ready to go to bed around 1030 in the morning because of this.     She is taking 400 mg of trazodone nightly, is helping somewhat more than the 300 mg.     She is on the Valtrex, has the flu shot, has one kidney. She does want to keep what she has.       Review of Systems   Per above      Objective    /68   Pulse 72   Wt 69.5 kg (153 lb 4.8 oz)   Breastfeeding No   BMI 26.73 kg/m    Body mass index is 26.73 kg/m .  Physical Exam   GENERAL: healthy, alert and no distress  NECK: no adenopathy, no asymmetry, masses, or scars and thyroid normal to palpation  RESP: lungs clear to auscultation - no rales, rhonchi or wheezes  CV: regular rate and rhythm, normal S1 S2, no S3 or S4, no murmur, click or rub, no peripheral edema and peripheral pulses strong  MS: no gross musculoskeletal defects noted, no edema  SKIN: no suspicious lesions or rashes and wound on the right top of her head is shallower, not scabby today, not bleeding today and no surrounding erythema or swelling.   I did treat this area with silver nitrate to help with cauterization for now. She will watch for further bleeding, new signs of infection and follow up here in a few weeks for a recheck.

## 2021-12-01 LAB
ALBUMIN SERPL-MCNC: 4.2 G/DL (ref 3.5–5)
ALP SERPL-CCNC: 48 U/L (ref 45–120)
ALT SERPL W P-5'-P-CCNC: 15 U/L (ref 0–45)
ANION GAP SERPL CALCULATED.3IONS-SCNC: 14 MMOL/L (ref 5–18)
AST SERPL W P-5'-P-CCNC: 23 U/L (ref 0–40)
BILIRUB SERPL-MCNC: 0.8 MG/DL (ref 0–1)
BUN SERPL-MCNC: 28 MG/DL (ref 8–28)
CALCIUM SERPL-MCNC: 9.6 MG/DL (ref 8.5–10.5)
CHLORIDE BLD-SCNC: 102 MMOL/L (ref 98–107)
CO2 SERPL-SCNC: 24 MMOL/L (ref 22–31)
CREAT SERPL-MCNC: 1.63 MG/DL (ref 0.6–1.1)
GFR SERPL CREATININE-BSD FRML MDRD: 30 ML/MIN/1.73M2
GLUCOSE BLD-MCNC: 86 MG/DL (ref 70–125)
POTASSIUM BLD-SCNC: 3.7 MMOL/L (ref 3.5–5)
PROT SERPL-MCNC: 6.6 G/DL (ref 6–8)
SODIUM SERPL-SCNC: 140 MMOL/L (ref 136–145)

## 2021-12-02 ENCOUNTER — VIRTUAL VISIT (OUTPATIENT)
Dept: NEUROLOGY | Facility: CLINIC | Age: 81
End: 2021-12-02
Payer: MEDICARE

## 2021-12-02 DIAGNOSIS — F43.10 PTSD (POST-TRAUMATIC STRESS DISORDER): Primary | ICD-10-CM

## 2021-12-02 PROCEDURE — 90834 PSYTX W PT 45 MINUTES: CPT | Mod: 95 | Performed by: PSYCHOLOGIST
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
## 2021-12-03 LAB
SARS-COV-2 AB SERPL IA-ACNC: 154 U/ML
SARS-COV-2 AB SERPL QL IA: POSITIVE

## 2021-12-03 NOTE — PROGRESS NOTES
Medication Therapy Management (MTM) Encounter    ASSESSMENT:                            Chronic pain: Continue to follow with the pain clinic. She is ready to taper down on her fentanyl, but she is concerned about the price of the 37.5 mcg patch.  I discussed that the next step down could be continuing the 50 mcg patch but changing it every 72 hours instead.  She was open to that idea and will discuss it with Dr. Lynn next week.  Continue to use ketamine as needed.  She can stop the zonisamide now, as instructed by Dr. Lynn.      Hyperlipidemia/PAD:  Continue current regimen of Praluent and Crestor 40 mg nightly with closely monitoring of lipids.     Depression/anxiety: Some improvement with starting methylphenidate, but no significant improvement with three times daily dosing. Taking two in the morning and one in the afternoon did not help her much.  BP is normal, no changes in appetite. She is taking the second dose early enough that she does not attribute it to her insomnia.  We will try using hydroxyzine in the evening.  Per chart review it sounds like the prescription was never sent, therefore it likely was not the medicine that was expensive.  This will hopefully help with possible anxiety in the evening and help her sleep, especially since she is now off nortriptyline.  It sounds like she is not getting a huge benefit from the methylphenidate and since she is due for refill, she will stop it until her follow-up.  for now  I think it would be reasonable to try Adderall instead of methylphenidate, but she will discuss this with Dr. Lynn.    Hypertension/edema: Continue current regimen.  I looked back at her weight in the last time her weight was in the 140s was in the summer, therefore I do not think her weight gain has occurred as fast as she thought.  I did encourage her to continue to check her weight every day and let us know if she gains more than 3 pounds in 24 hours or 5 pounds in a week.   Last blood pressure at goal.    Insomnia: Patient is on a high dose of trazodone,but her sleep may have worsened since being off nortriptyline.  However, we will have her remain off nortriptyline and trial of hydroxyzine (see above)      PLAN:                            1.  Start hydroxyzine pamoate 25 mg nightly  2.  Talk to Dr. Lynn about changing fentanyl to 50 mcg every 72 hours  3.  Talk to Dr. Lynn next week about your methylphenidate and possibly trying Adderall instead  4.  Per recommendation from Dr. Lynn, finish the zonisamide that you currently have    Follow-up: 2-week phone follow-up    SUBJECTIVE/OBJECTIVE:                          Sandy Spencer is a 81 year old female called for a follow up visit. She was referred from Dr. Rees for polypharmacy.     Reason for visit: Follow up since we last spoke on 11/15/2021  Medication Adherence/Access:  She used to have home care through [facility] but was discharged.  More recently her friend (an RN) has been setting up her medicines for her. Certain oral medications goes right through her -- has 9 inches of her colon. Would like that considered for her medications. She is in the donut hole now. She is using [facility] for some of her medicines.     Chronic pain: Follows with the pain clinic. Currently on fentanyl 50 mcg patch every 48 hours.   Fentanyl was decreased from 62.5 mcg (previously was on 75 mcg).  She when she should do for the next step in the decrease, she is concerned about the price of the 37.5 mcg patches.  She would like to be off the fentanyl patch eventually and would like to continue to taper off.   Ketamine 40 mg lozenge half (20 mg) four times daily started on 11/4/21.  She has used it about 3 times now, using for extreme pain. She thinks it has been helpful so far, but she does not like the taste since it is a very strong peppermint flavor.  She also is unable to cut it in half, therefore she sucks on and saves the  other half.   It was recommended that with the start of ketamine, she can taper and discontinue zonisamide and lamotrigine. She did stop the lamotrigine, no taper. She is not sure if she feels any different. She continues the zonisamide but has decreased to once daily.  She has about 1 day left in her pillbox.  Patient has Narcan at home    Hyperlipidemia/PAD: Currently taking Praluent 75 mg every 14 days and rosuvastatin 40 mg daily. She was on a total 60 mg -- I asked Dr. Ybarra about decreasing this, no response. However, Sandy decreased on her own. Was on atorvastatin in the past and changed to rosuvastatin. Denies myalgias. She saw Dr. Ybarra on 11/11. Last lipids checked 9/17/2021.     Depression/anxiety: She met with Dr. Lynn and nortriptyline was tapered down and methylphenidate 10 mg increased to three times daily (7am, 12pm, 3pm) -  was increased from twice daily on 11/4/2021.  She is taking now 2 tablets in the morning around 6 AM and 1 tablet around noon.  She is no longer on nortriptyline.  She feels more agitated off lamotrigine, but thinks it is the change in weather.   She is no longer taking BuSpar -- she feels that contributed to her recent fall. She is anxious due to all her medical issues. She is diagnosed with ADHD and PTSD. She does not have nightmares. She was on Adderall and ritalin in the past. No suicidal thoughts or panic attacks.  She finds the Ritalin helpful and is not having naps in the middle of the day anymore, but does think she needs more help with focusing.  She wonders about taking a break from the Ritalin since it is expensive and she would like to try Adderall instead.  She has not noticed any weight loss.  BP Readings from Last 3 Encounters:   11/30/21 110/68   11/11/21 120/66   11/04/21 (!) 153/92       Hypertension/edema: Currently taking furosemide 40 mg AM, carvedilol 12.5 mg twice daily and lisinopril 40 mg daily.  Reports that she has lately been having swelling in her feet  and ankles, her legs feel fat and her pants are tight on her legs.  She feels like she has gained 11 pounds in a month.  Patient does monitor BP at home.  Follows with nephrology.    Wt Readings from Last 2 Encounters:   11/30/21 153 lb 4.8 oz (69.5 kg)   11/11/21 151 lb 9.6 oz (68.8 kg)       Insomnia: Taking trazodone 100 mg 2-4 tablets nightly.  No longer on nortriptyline.  She takes 3 tablets of trazodone at bedtime then if not helpful after about an hour she will take another 1 in the middle the night.  Reports that her mind does not shut off.  She does not feel like the methylphenidate has changed her sleep since she takes the second dose around noon.  No hangover feeling.  She does not remember ever trying hydroxyzine, but thinks it was expensive.  BP Readings from Last 3 Encounters:   11/30/21 110/68   11/11/21 120/66   11/04/21 (!) 153/92       Today's Vitals: There were no vitals taken for this visit.  ----------------    Allergies/ADRs: Reviewed in chart  Past Medical History: Reviewed in chart  Tobacco: She reports that she quit smoking about 21 years ago. She has a 20.00 pack-year smoking history. She has never used smokeless tobacco.  Alcohol: Reviewed in chart    I spent 40 minutes with this patient today. All changes were made via collaborative practice agreement with Caitlyn Rees MD. A copy of the visit note was provided to the patient's primary care provider.    The patient declined a summary of these recommendations.     Darlin Elise, Pharm.D., [facility]   Medication Therapy Management Pharmacist   [facility]     Telemedicine Visit Details  Type of service:  Telephone visit  Start Time: 11:09 AM  End Time: 11:49 AM  Originating Location (patient location): Home  Distant Location (provider location):  [facility]     Medication Therapy Recommendations  ADD (attention deficit disorder)    Current Medication: methylphenidate  (RITALIN) 10 MG tablet   Rationale: More effective medication available - Ineffective medication - Effectiveness   Recommendation: Change Medication - Adderall 10 MG Tabs   Status: Contact Provider - Awaiting Response         Anxiety disorder due to medical condition    Current Medication: hydrOXYzine (VISTARIL) 25 MG capsule   Rationale: Synergistic therapy - Needs additional medication therapy - Indication   Recommendation: Start Medication   Status: Accepted per CPA         Chronic pain syndrome    Current Medication: fentaNYL (DURAGESIC) 50 mcg/hr 72 hr patch   Rationale: Frequency inappropriate - Dosage too high - Safety   Recommendation: Decrease Frequency   Status: Contact Provider - Awaiting Response

## 2021-12-06 ENCOUNTER — VIRTUAL VISIT (OUTPATIENT)
Dept: PHARMACY | Facility: CLINIC | Age: 81
End: 2021-12-06
Payer: COMMERCIAL

## 2021-12-06 DIAGNOSIS — F06.4 ANXIETY DISORDER DUE TO MEDICAL CONDITION: ICD-10-CM

## 2021-12-06 DIAGNOSIS — R60.1 GENERALIZED EDEMA: ICD-10-CM

## 2021-12-06 DIAGNOSIS — E78.5 HYPERLIPIDEMIA LDL GOAL <70: ICD-10-CM

## 2021-12-06 DIAGNOSIS — G47.00 INSOMNIA, UNSPECIFIED TYPE: ICD-10-CM

## 2021-12-06 DIAGNOSIS — G89.4 CHRONIC PAIN SYNDROME: Primary | ICD-10-CM

## 2021-12-06 PROCEDURE — 99606 MTMS BY PHARM EST 15 MIN: CPT | Performed by: PHARMACIST

## 2021-12-06 PROCEDURE — 99607 MTMS BY PHARM ADDL 15 MIN: CPT | Performed by: PHARMACIST

## 2021-12-06 RX ORDER — HYDROXYZINE PAMOATE 25 MG/1
25 CAPSULE ORAL
Qty: 30 CAPSULE | Refills: 0 | Status: SHIPPED | OUTPATIENT
Start: 2021-12-06 | End: 2021-12-20

## 2021-12-07 ENCOUNTER — HOSPITAL ENCOUNTER (OUTPATIENT)
Dept: PHYSICAL THERAPY | Facility: REHABILITATION | Age: 81
End: 2021-12-07
Payer: MEDICARE

## 2021-12-07 ENCOUNTER — TELEPHONE (OUTPATIENT)
Dept: FAMILY MEDICINE | Facility: CLINIC | Age: 81
End: 2021-12-07

## 2021-12-07 DIAGNOSIS — G89.4 CHRONIC PAIN SYNDROME: ICD-10-CM

## 2021-12-07 DIAGNOSIS — G90.523 COMPLEX REGIONAL PAIN SYNDROME TYPE 1 OF BOTH LOWER EXTREMITIES: Primary | ICD-10-CM

## 2021-12-07 DIAGNOSIS — E03.9 ACQUIRED HYPOTHYROIDISM: Primary | ICD-10-CM

## 2021-12-07 DIAGNOSIS — M79.18 MYOFASCIAL PAIN: ICD-10-CM

## 2021-12-07 PROCEDURE — 97140 MANUAL THERAPY 1/> REGIONS: CPT | Mod: GP | Performed by: PHYSICAL THERAPIST

## 2021-12-07 NOTE — TELEPHONE ENCOUNTER
PA Initiation    Medication: hydrOXYzine (VISTARIL) 25 MG capsule  Insurance Company:  MEDICARE   Pharmacy Filling the Rx: [facility]    Filling Pharmacy Phone: [phone]   Filling Pharmacy Fax: [phone]   Start Date:

## 2021-12-08 RX ORDER — LEVOTHYROXINE SODIUM 50 UG/1
TABLET ORAL
Qty: 90 TABLET | Refills: 2 | Status: SHIPPED | OUTPATIENT
Start: 2021-12-08 | End: 2022-03-16

## 2021-12-08 NOTE — TELEPHONE ENCOUNTER
Central Prior Authorization Team  Phone: [phone]    [place] Initiation    Medication: hydrOXYzine (VISTARIL) 25 MG capsule  Insurance Company: [employer] - Phone [phone] Fax [phone]  Pharmacy Filling the Rx: [address] [place] [address]  Filling Pharmacy Phone: [phone]  Filling Pharmacy Fax:    Start Date: 12/8/2021

## 2021-12-08 NOTE — TELEPHONE ENCOUNTER
Prior Authorization Approval    Authorization Effective Date: 9/9/2021  Authorization Expiration Date:  Until further notice   Medication: hydrOXYzine (VISTARIL) 25 MG capsule- APPROVED   Approved Dose/Quantity:   Reference #:     Insurance Company: [employer] - [email or web address] [phone] Fax [phone]  Expected CoPay:       CoPay Card Available:      Foundation Assistance Needed:    Which Pharmacy is filling the prescription (Not needed for infusion/clinic administered): [facility] [place] [address]  Pharmacy Notified: Yes  Patient Notified:  **Instructed pharmacy to notify patient when script is ready to /ship.**

## 2021-12-08 NOTE — TELEPHONE ENCOUNTER
"Routing refill request to provider for review/approval because:  Medication is reported/historical  NO records of previous written prescriptions found on V or E medication lists by clinic providers - appears to have been started inpatient      Outpatient Medication Detail     Disp Refills Start End STEVE   levothyroxine (SYNTHROID, LEVOTHROID) 50 MCG tablet   5/17/2021  --   Class: Historical Med       levothyroxine (SYNTHROID, LEVOTHROID) 50 MCG tablet [[id number]]  Patient-reported historical medication  Ordering date: 05/19/21 0737 Authorized by: PROVIDER, HISTORICAL   Frequency:  05/17/21 - Until Discontinued       Last office visit provider:  11/30/21     Requested Prescriptions   Pending Prescriptions Disp Refills     levothyroxine (SYNTHROID/LEVOTHROID) 50 MCG tablet [Pharmacy Med Name: LEVOTHYROXINE SODIUM 50MCG TABS] 90 tablet 2     Sig: TAKE ONE TABLET BY MOUTH EVERY DAY       Thyroid Protocol Passed - 12/7/2021  3:25 PM        Passed - Patient is 12 years or older        Passed - Recent (12 mo) or future (30 days) visit within the authorizing provider's specialty     Patient has had an office visit with the authorizing provider or a provider within the authorizing providers department within the previous 12 mos or has a future within next 30 days. See \"Patient Info\" tab in inbasket, or \"Choose Columns\" in Meds & Orders section of the refill encounter.              Passed - Medication is active on med list        Passed - Normal TSH on file in past 12 months     Recent Labs   Lab Test 07/13/21  1454   TSH 0.64              Passed - No active pregnancy on record     If patient is pregnant or has had a positive pregnancy test, please check TSH.          Passed - No positive pregnancy test in past 12 months     If patient is pregnant or has had a positive pregnancy test, please check TSH.               Jessica Wu RN 12/08/21 3:16 PM  "

## 2021-12-09 ENCOUNTER — HOSPITAL ENCOUNTER (OUTPATIENT)
Dept: MRI IMAGING | Facility: HOSPITAL | Age: 81
Discharge: HOME | End: 2021-12-09
Attending: FAMILY MEDICINE | Admitting: FAMILY MEDICINE
Payer: MEDICARE

## 2021-12-09 ENCOUNTER — VIRTUAL VISIT (OUTPATIENT)
Dept: NEUROLOGY | Facility: CLINIC | Age: 81
End: 2021-12-09
Payer: MEDICARE

## 2021-12-09 DIAGNOSIS — F43.23 ADJUSTMENT DISORDER WITH MIXED ANXIETY AND DEPRESSED MOOD: Primary | ICD-10-CM

## 2021-12-09 DIAGNOSIS — M54.2 NECK PAIN: ICD-10-CM

## 2021-12-09 PROCEDURE — G1004 CDSM NDSC: HCPCS

## 2021-12-09 PROCEDURE — 72141 MRI NECK SPINE W/O DYE: CPT | Mod: ME

## 2021-12-09 PROCEDURE — 90834 PSYTX W PT 45 MINUTES: CPT | Mod: 95 | Performed by: PSYCHOLOGIST

## 2021-12-09 NOTE — PROGRESS NOTES
Psychology Progress Note    Date: December 09, 2021    Time length and type of treatment: 48 minutes (10:01 AM to 10:49 AM), individual therapy    After review of the patient's situation, this visit was changed from an in-person visit to a  video visit via [employer] to reduce the risk of COVID 19 exposure. Patient was informed that policies and procedures that govern in-person sessions would also apply to  video sessions. Patient was also informed that  video sessions would be discontinued when COVID 19 exposure is no longer a concern (as determined by [facility]).     Patient location: Patient home in [place], [place]  Provider location:  [facility], [place], [place]    Patient was in agreement with proceeding with a  video session.      Necessity: This session is necessary to address the patient's PTSD, anxiety, and depressed mood.  Today we focus on the patient's treatment plan, specifically exploring bibliotherapy.  The reader is invited to review the patient's full treatment plan in the Media section of the patient's Epic medical record.    Psychotherapeutic Technique: This writer utilized motivational interviewing, active listening, reassurance and support in the context of cognitive behavioral therapy to address the above.      MENTAL STATUS EVALUATION  Grooming: Within normal limits  Attire: Appropriate  Age: Appears Stated  Behavior Towards Examiner: Cooperative  Motor Activity: Within normal limits  Eye Contact: Avoidant  Mood: Anxious   Affect: full range  Speech/Language: Mildly pressured  Attention: Easily distracted  Concentration: Sufficient  Thought Process: tangential  Thought Content: Clear    Orientation: Appeared oriented to person, place, and time, though not formally established  Memory: No evidence of impairment.  Judgement: Adequate  Estimated Intelligence: Average  Demonstrated Insight: Adequate  Fund of Knowledge: Adequate    Intervention:   Patient has  been reading Changes That Heal: The Four Shifts That Make Everything Better ...and That Anyone Can Do by Tapan Chang.  She reported she has been underlying substantial portions of this book and multiple concepts made more sense to her as she read them. Patient was able to recognize how she has used denial and projection in unhelpful ways, especially in her relationship with her oldest daughter and discussed the tension between being a giving person and having good boundaries. Patient was encouraged to place sticky notes in places where she runs across ideas or concepts that are unclear to her, and we can discuss in session.  Patient has also ordered a book on emotion regulation that was recommended last session, and some of the concepts that will be covered in that book were briefly reviewed to help patient better absorb content.     Progress:  Patient demonstrated increased insight into interpersonal dynamics and her role in conflict.      Plan:   We will meet again in 1 week to address the patient's PTSD, anxiety, and depressed mood.  Estimated duration of treatment is 10+ individual therapy sessions (55298) at weekly intervals. Treatment is expected to be completed by September 2022.     Diagnosis:  Adjustment Disorder with mixed anxiety and depressed mood  Posttraumatic Stress Disorder (PTSD)  Attention-Deficit/ Hyperactivity Disorder, combined presentation

## 2021-12-09 NOTE — LETTER
12/9/2021         RE: Sandy Spencer  [address]  [place] [address]        Dear Colleague,    Thank you for referring your patient, Sandy Spencer, to the [facility]. Please see a copy of my visit note below.    Psychology Progress Note    Date: December 09, 2021    Time length and type of treatment: 48 minutes (10:01 AM to 10:49 AM), individual therapy    After review of the patient's situation, this visit was changed from an in-person visit to a  video visit via [employer] to reduce the risk of COVID 19 exposure. Patient was informed that policies and procedures that govern in-person sessions would also apply to  video sessions. Patient was also informed that  video sessions would be discontinued when COVID 19 exposure is no longer a concern (as determined by [facility]).     Patient location: Patient home in [place], [place]  Provider location:  [facility], [place], [place]    Patient was in agreement with proceeding with a  video session.      Necessity: This session is necessary to address the patient's PTSD, anxiety, and depressed mood.  Today we focus on the patient's treatment plan, specifically exploring bibliotherapy.  The reader is invited to review the patient's full treatment plan in the Media section of the patient's Epic medical record.    Psychotherapeutic Technique: This writer utilized motivational interviewing, active listening, reassurance and support in the context of cognitive behavioral therapy to address the above.      MENTAL STATUS EVALUATION  Grooming: Within normal limits  Attire: Appropriate  Age: Appears Stated  Behavior Towards Examiner: Cooperative  Motor Activity: Within normal limits  Eye Contact: Avoidant  Mood: Anxious   Affect: full range  Speech/Language: Mildly pressured  Attention: Easily distracted  Concentration: Sufficient  Thought Process: tangential  Thought Content: Clear    Orientation:  Appeared oriented to person, place, and time, though not formally established  Memory: No evidence of impairment.  Judgement: Adequate  Estimated Intelligence: Average  Demonstrated Insight: Adequate  Fund of Knowledge: Adequate    Intervention:   Patient has been reading Changes That Heal: The Four Shifts That Make Everything Better ...and That Anyone Can Do by Tapan Chang.  She reported she has been underlying substantial portions of this book and multiple concepts made more sense to her as she read them. Patient was able to recognize how she has used denial and projection in unhelpful ways, especially in her relationship with her oldest daughter and discussed the tension between being a giving person and having good boundaries. Patient was encouraged to place sticky notes in places where she runs across ideas or concepts that are unclear to her, and we can discuss in session.  Patient has also ordered a book on emotion regulation that was recommended last session, and some of the concepts that will be covered in that book were briefly reviewed to help patient better absorb content.     Progress:  Patient demonstrated increased insight into interpersonal dynamics and her role in conflict.      Plan:   We will meet again in 1 week to address the patient's PTSD, anxiety, and depressed mood.  Estimated duration of treatment is 10+ individual therapy sessions (00174) at weekly intervals. Treatment is expected to be completed by September 2022.     Diagnosis:  Adjustment Disorder with mixed anxiety and depressed mood  Posttraumatic Stress Disorder (PTSD)  Attention-Deficit/ Hyperactivity Disorder, combined presentation      Again, thank you for allowing me to participate in the care of your patient.        Sincerely,        Deann Meeks Psy.D, LP

## 2021-12-10 ENCOUNTER — TELEPHONE (OUTPATIENT)
Dept: FAMILY MEDICINE | Facility: CLINIC | Age: 81
End: 2021-12-10
Payer: MEDICARE

## 2021-12-10 NOTE — TELEPHONE ENCOUNTER
PA Initiation    Medication: Hydroxyzine 25 mg  Insurance Company: Medicare    Pharmacy Filling the Rx: [facility]  Filling Pharmacy Phone: [phone]  Filling Pharmacy Fax: [phone]  Start Date: 12/6/2021

## 2021-12-15 ENCOUNTER — OFFICE VISIT (OUTPATIENT)
Dept: FAMILY MEDICINE | Facility: CLINIC | Age: 81
End: 2021-12-15
Payer: MEDICARE

## 2021-12-15 ENCOUNTER — OFFICE VISIT (OUTPATIENT)
Dept: PALLIATIVE MEDICINE | Facility: OTHER | Age: 81
End: 2021-12-15
Payer: MEDICARE

## 2021-12-15 VITALS
OXYGEN SATURATION: 98 % | HEART RATE: 77 BPM | BODY MASS INDEX: 26.85 KG/M2 | WEIGHT: 154 LBS | SYSTOLIC BLOOD PRESSURE: 98 MMHG | DIASTOLIC BLOOD PRESSURE: 58 MMHG

## 2021-12-15 VITALS
WEIGHT: 153 LBS | SYSTOLIC BLOOD PRESSURE: 123 MMHG | HEIGHT: 64 IN | DIASTOLIC BLOOD PRESSURE: 68 MMHG | HEART RATE: 76 BPM | BODY MASS INDEX: 26.12 KG/M2

## 2021-12-15 DIAGNOSIS — G89.4 CHRONIC PAIN SYNDROME: ICD-10-CM

## 2021-12-15 DIAGNOSIS — S06.0X9D CONCUSSION WITH LOSS OF CONSCIOUSNESS, SUBSEQUENT ENCOUNTER: Primary | ICD-10-CM

## 2021-12-15 DIAGNOSIS — G47.00 INSOMNIA, UNSPECIFIED TYPE: ICD-10-CM

## 2021-12-15 DIAGNOSIS — F06.4 ANXIETY DISORDER DUE TO MEDICAL CONDITION: ICD-10-CM

## 2021-12-15 DIAGNOSIS — M47.897 OTHER SPONDYLOSIS, LUMBOSACRAL REGION: ICD-10-CM

## 2021-12-15 DIAGNOSIS — T14.8XXA NONHEALING NONSURGICAL WOUND: ICD-10-CM

## 2021-12-15 DIAGNOSIS — M54.16 LUMBAR RADICULOPATHY: Primary | ICD-10-CM

## 2021-12-15 PROCEDURE — 99214 OFFICE O/P EST MOD 30 MIN: CPT | Performed by: ANESTHESIOLOGY

## 2021-12-15 PROCEDURE — G0463 HOSPITAL OUTPT CLINIC VISIT: HCPCS

## 2021-12-15 PROCEDURE — 99215 OFFICE O/P EST HI 40 MIN: CPT | Performed by: FAMILY MEDICINE

## 2021-12-15 RX ORDER — DEXTROAMPHETAMINE SACCHARATE, AMPHETAMINE ASPARTATE, DEXTROAMPHETAMINE SULFATE AND AMPHETAMINE SULFATE 2.5; 2.5; 2.5; 2.5 MG/1; MG/1; MG/1; MG/1
10 TABLET ORAL 2 TIMES DAILY
Qty: 60 TABLET | Refills: 0 | Status: SHIPPED | OUTPATIENT
Start: 2021-12-15 | End: 2022-01-12

## 2021-12-15 RX ORDER — FENTANYL 25 UG/1
1 PATCH TRANSDERMAL
Qty: 10 PATCH | Refills: 0 | Status: SHIPPED | OUTPATIENT
Start: 2021-12-15 | End: 2022-01-24

## 2021-12-15 RX ORDER — MIRTAZAPINE 15 MG/1
TABLET, FILM COATED ORAL
Qty: 30 TABLET | Refills: 1 | Status: SHIPPED | OUTPATIENT
Start: 2021-12-15 | End: 2022-01-31

## 2021-12-15 RX ORDER — FENTANYL 12.5 UG/1
1 PATCH TRANSDERMAL
Qty: 5 PATCH | Refills: 0 | Status: SHIPPED | OUTPATIENT
Start: 2021-12-15 | End: 2021-12-17

## 2021-12-15 ASSESSMENT — MIFFLIN-ST. JEOR: SCORE: 1146.06

## 2021-12-15 ASSESSMENT — PAIN SCALES - GENERAL: PAINLEVEL: SEVERE PAIN (7)

## 2021-12-15 NOTE — PATIENT INSTRUCTIONS
I'd recommend that you try a SAD light each morning and increase activity to help with sleeping  PT for neck and low back  Talk with Dr. Lynn to see if he thinks you should see Dr. Mittal for your neck/back pain

## 2021-12-15 NOTE — PROGRESS NOTES
PEG Score 12/15/2021   PEG Total Score 7.33          [facility] Number:  [phone]    Call with any questions about your care and for scheduling assistance.     Calls are returned Monday through Friday between 8 AM and 4:30 PM. We usually get back to you within 2 business days depending on the issue/request.    If we are prescribing your medications:    For opioid medication refills, call the clinic or send a [employer] message 7 days in advance.  Please include:    Name of requested medication    Name of the pharmacy.    For non-opioid medications, call your pharmacy directly to request a refill. Please allow 3-4 days to be processed.     Per [place] State Law:    All controlled substance prescriptions must be filled within 30 days of being written.      For those controlled substances allowing refills, pickup must occur within 30 days of last fill.      We believe regular attendance is key to your success in our program!      Any time you are unable to keep your appointment we ask that you call us at least 24 hours in advance to cancel.This will allow us to offer the appointment time to another patient.     Multiple missed appointments may lead to dismissal from the clinic.

## 2021-12-15 NOTE — PROGRESS NOTES
12/15/21 1312   PEG: A Thee-Item Scale Assessing Pain Intensity and Interference        0 = No pain / No interference    10 = Pain as bad as you can imagine / Completely interferes   What number best describes your pain on average in the past week? 7   What number best describes how, during the past week, pain has interfered with your enjoyment of life? 5   What number best describes how, during the past week, pain has interfered with your general activity? 10   PEG Total Score 7.33

## 2021-12-15 NOTE — LETTER
12/15/2021         RE: Sandy Spencer  [address]  [facility] [address]        Dear Colleague,    Thank you for referring your patient, Sandy Spencer, to the [facility]. Please see a copy of my visit note below.      PEG Score 12/15/2021   PEG Total Score 7.33          [facility]    Clinic Number:  [phone]    Call with any questions about your care and for scheduling assistance.     Calls are returned Monday through Friday between 8 AM and 4:30 PM. We usually get back to you within 2 business days depending on the issue/request.    If we are prescribing your medications:    For opioid medication refills, call the clinic or send a Zoomph message 7 days in advance.  Please include:    Name of requested medication    Name of the pharmacy.    For non-opioid medications, call your pharmacy directly to request a refill. Please allow 3-4 days to be processed.     Per [place] State Law:    All controlled substance prescriptions must be filled within 30 days of being written.      For those controlled substances allowing refills, pickup must occur within 30 days of last fill.      We believe regular attendance is key to your success in our program!      Any time you are unable to keep your appointment we ask that you call us at least 24 hours in advance to cancel.This will allow us to offer the appointment time to another patient.     Multiple missed appointments may lead to dismissal from the clinic.            Alicia Delgado is a 81 year old who presents for the following health issues     includingmigraine headaches, lumbar radicular changes, chronic regional pain syndrome.    HPI     She is seen with her daughter.    She demonstrates that she has had patches on each thigh, where the skin is indeed turning whitish, and the skin has taken on a different texture.  It is not more painful, perhaps a bit more numb.  Occurring for 6 months.   "Sometimes it can feel numb.  There could be some burning medially up to the groin    She does see a new neurologist the end of this week to review concerns about her leg dragging.    She has not been sleeping well, is using trazodone 300 mg at night.    Ritalin has been less effective further cognitive concerns since her concussion.  Recalls she used Adderall in the past for ADD and tolerated better.    She has been continue to try to decrease her fentanyl patch now down to 50 mcg extended every 3 days.  She reviews the cost of the 37.5 mcg is quite expensive.  We discussed could use a 25 mcg and a 12.  Again she is concerned about that cost of think she could just go to 25 mcg that is her goal.  Not particular concerned about withdrawal symptoms.  Daughter however notes she appears more fidgety.  Sleep could be an issue.    There is some discussion whether she has shingles or other condition next time she has a flareup they will biopsy the lesions.    She was referred to  By Dr. Rees for physical therapy.  She reviews when she works with her present physical therapist it \"takes almost an hour just to clear out my brain\".    She has been ordered for an MRI of her low back.    Reviews the last radiofrequency ablation she had in her neck seem to be very painful and she is apprehensive of doing a lumbar medial branch block.      Current Outpatient Medications:      amphetamine-dextroamphetamine (ADDERALL) 10 MG tablet, Take 1 tablet (10 mg) by mouth 2 times daily Morning and noon, Disp: 60 tablet, Rfl: 0     apixaban ANTICOAGULANT (ELIQUIS) 5 MG tablet, Take 1 tablet (5 mg) by mouth 2 times daily, Disp: 180 tablet, Rfl: 3     azelastine (ASTEPRO) 0.15 % nasal spray, Spray 2 sprays into both nostrils 2 times daily , Disp: , Rfl:      Butalbital-Acetaminophen (PHRENILIN)  MG TABS per tablet, Take 1 tablet by mouth daily as needed for headaches, Disp: 15 tablet, Rfl: 1     carvedilol (COREG) 12.5 MG tablet, " Take 12.5 mg by mouth 2 times daily (with meals) , Disp: , Rfl:      fentaNYL (DURAGESIC) 12 mcg/hr 72 hr patch, Place 1 patch onto the skin every 72 hours remove old patch., Disp: 5 patch, Rfl: 0     fentaNYL (DURAGESIC) 25 mcg/hr 72 hr patch, Place 1 patch onto the skin every 72 hours remove old patch., Disp: 10 patch, Rfl: 0     fentaNYL (DURAGESIC) 50 mcg/hr 72 hr patch, Place 1 patch onto the skin every 48 hours remove old patch., Disp: 15 patch, Rfl: 0     furosemide (LASIX) 40 MG tablet, Take 1 tablet (40 mg) by mouth daily, Disp: 90 tablet, Rfl: 1     hydrOXYzine (VISTARIL) 25 MG capsule, Take 1 capsule (25 mg) by mouth nightly as needed for anxiety (and insomnia), Disp: 30 capsule, Rfl: 0     ketamine HCl POWD, 40 mg lozenge, one-half 4 times a day, Disp: 60 g, Rfl: 1     levothyroxine (SYNTHROID/LEVOTHROID) 50 MCG tablet, TAKE ONE TABLET BY MOUTH EVERY DAY, Disp: 90 tablet, Rfl: 2     lisinopril (ZESTRIL) 40 MG tablet, Take 40 mg by mouth At Bedtime , Disp: , Rfl:      methylphenidate (RITALIN) 10 MG tablet, One tablet morning, noon, anf 4 pm, Disp: 90 tablet, Rfl: 0     mirtazapine (REMERON) 15 MG tablet, One-half to one tab bedtime, Disp: 30 tablet, Rfl: 1     naloxone (NARCAN) 4 MG/0.1ML nasal spray, Spray 1 spray (4 mg) into one nostril alternating nostrils once as needed for opioid reversal every 2-3 minutes until assistance arrives, Disp: 0.2 mL, Rfl: 0     PRALUENT 75 MG/ML injectable pen, INJECT 75MG UNDER THE SKIN EVERY 14 DAYS, Disp: 12 mL, Rfl: 4     rosuvastatin (CRESTOR) 40 MG tablet, Take 40 mg by mouth At Bedtime, Disp: , Rfl:      senna (SENNA-TIME) 8.6 MG tablet, Take 1-3 tablets by mouth daily as needed , Disp: , Rfl:      SUMAtriptan (IMITREX) 50 MG tablet, Take 50 mg by mouth at onset of headache , Disp: , Rfl:      traZODone (DESYREL) 100 MG tablet, Take 3-4 tablets (300-400 mg) by mouth At Bedtime, Disp: 360 tablet, Rfl: 1     valACYclovir (VALTREX) 1000 mg tablet, TAKE ONE TABLET BY  "MOUTH THREE TIMES A DAY FOR 7 DAYS, AS NEEDED FOR OUTBREAKS, Disp: , Rfl:     On exam she is alert with a clear sensorium good eye contact.  Thought process logical.  Appropriately groomed.    She notes had her scalp cauterized it is healed.    Each thigh indeed has an area with whitish discoloration and a thicker texture.    Review of Systems         Objective    /68   Pulse 76   Ht 1.613 m (5' 3.5\")   Wt 69.4 kg (153 lb)   BMI 26.68 kg/m    Body mass index is 26.68 kg/m .  Physical Exam       Assessment reviewed the 6 months present of this unusual weight discoloration and change of the skin texture in an L3 distribution bilaterally, not particularly painful.  She will see a neurologist this week.  We will inquire the opinion and whether a dermatologist should also be followed.    She reviews her back pain has been more of an issue than radicular pain.  She does have facet arthropathy.  We discussed a facet block may be more helpful than the steroid injection presently.  She is very worried about the process of medial branch blocks before doing radiofrequency ablation.    Plan: We discussed for tapering the fentanyl will prescribe 25 mcg and 12 mcg patches, she will determine whether she wants to use the cost of a micrograms versus alternating 25 and 50 mcg patches every 3 days till reaches a more steady state.    We will change the Ritalin to Adderall.    Total time more than 25 minutes               12/15/21 1312   PEG: A Thee-Item Scale Assessing Pain Intensity and Interference        0 = No pain / No interference    10 = Pain as bad as you can imagine / Completely interferes   What number best describes your pain on average in the past week? 7   What number best describes how, during the past week, pain has interfered with your enjoyment of life? 5   What number best describes how, during the past week, pain has interfered with your general activity? 10   PEG Total Score 7.33       Again, thank you " for allowing me to participate in the care of your patient.        Sincerely,        ARELIS FITZPATRICK MD

## 2021-12-15 NOTE — PATIENT INSTRUCTIONS
PLAN:  At checkout schedule with Dr. Mittal for a lumbar facet block for back pain.    You will see the neurologist on Friday, asked them to send a copy of your notes to Dr. Lynn.    Remeron (mirtazapine) for sleep, 15 mg tablet, 1/2 tablet at night for 4 nights then increase to 1 tablet, after 1 week if needed to one 1/2.    Discussed tapering the fentanyl.  May try at 25 and a 12 mcg patch every 3 days.  If the 12 mcg patch is too expensive may alternate a 50 mcg patch for 3 days with a 25 mcg patch for 3 days for 1 month.    Change Ritalin to Adderall 10 mg morning and noon for cognitive difficulties after concussion.    Follow-up with Dr. Lynn in 8 weeks

## 2021-12-15 NOTE — PROGRESS NOTES
Assessment & Plan     Lumbar radiculopathy  -Given worsening symptoms will refer for MRI of the lumbar spine for further evaluation and assessment.  In addition to this, the patient does see physical therapy for her RSD, will refer her for formal low back physical therapy as well.  - MR Lumbar Spine w/o Contrast  - Physical Therapy Referral    Chronic pain syndrome  -Per above, referral for her low back issues.  - Physical Therapy Referral    Insomnia, unspecified type  -She does think that the hydroxyzine is helping with her sleep, we discussed that I would recommend a seasonal affective disorder light as well to use in the mornings to see if this will help with her circadian rhythm.  In addition to that she will continue with good sleep hygiene.    Nonhealing nonsurgical wound  -Appears to finally be healed over now status post cauterization, will continue to monitor for now.               Return in about 3 months (around 3/15/2022).  54 minutes spent on the date of the encounter doing chart review, history and exam, documentation and further activities per the note         Caitlyn Rees MD  [facility]    Alicia Delgado is a 81 year old who presents for the following health issues     HPI     She is here today for a follow up.     She does have to deal with her pain. She is seeing the pain clinic this afternoon. Her low back is quite bad right now. She does have pain in her neck as well. She has done well previously with the RFA in [place].She does find that this procedure was so painful that she does not want to have to do this again. She would be willing to consider it for her low back. She does still have pain in her head though.     She does have a constant burning pain in her left buttock, does wonder if this is something. She does want to be off the fentanyl but her Ketamine is a very pungent flavor. She does find that her legs feel swollen all the time, even with the  lasix.Her mother did have two back surgeries. She is working with PT right now for her RSD, is doing this every other week right now. She does have some exercises at home for her low back pain. She does not know if there is a gym that she can go to. She does note that she has done well with pool therapy in the past. She does worry about this in the winter though, exacerbating her pain. She does not really do much exercise through the week.     For follow up on her sleeping she is doing ok. She has not gotten a sad light yet. She did get some hydroxyzine from the pharmacist and that seems to be working. She does not think that the melatonin has worked for her in the past and so would like to not take this again.     She does note that her head wound is improving.         Review of Systems   Per above      Objective    BP 98/58   Pulse 77   Wt 69.9 kg (154 lb)   SpO2 98%   BMI 26.85 kg/m    Body mass index is 26.85 kg/m .  Physical Exam   GENERAL: healthy, alert and no distress  NECK: no adenopathy, no asymmetry, masses, or scars and thyroid normal to palpation  RESP: lungs clear to auscultation - no rales, rhonchi or wheezes  CV: regular rate and rhythm, normal S1 S2, no S3 or S4, no murmur, click or rub, no peripheral edema and peripheral pulses strong  MS: no gross musculoskeletal defects noted, no edema  SKIN: scalp wound finally healed over

## 2021-12-16 ENCOUNTER — TELEPHONE (OUTPATIENT)
Dept: FAMILY MEDICINE | Facility: CLINIC | Age: 81
End: 2021-12-16
Payer: MEDICARE

## 2021-12-16 ENCOUNTER — VIRTUAL VISIT (OUTPATIENT)
Dept: NEUROLOGY | Facility: CLINIC | Age: 81
End: 2021-12-16
Payer: MEDICARE

## 2021-12-16 DIAGNOSIS — R23.8 CHANGE OF SKIN COLOR: Primary | ICD-10-CM

## 2021-12-16 DIAGNOSIS — F43.23 ADJUSTMENT DISORDER WITH MIXED ANXIETY AND DEPRESSED MOOD: Primary | ICD-10-CM

## 2021-12-16 PROCEDURE — 90834 PSYTX W PT 45 MINUTES: CPT | Mod: 95 | Performed by: PSYCHOLOGIST

## 2021-12-16 NOTE — PROGRESS NOTES
"      Alicia Delgado is a 81 year old who presents for the following health issues     includingmigraine headaches, lumbar radicular changes, chronic regional pain syndrome.    HPI     She is seen with her daughter.    She demonstrates that she has had patches on each thigh, where the skin is indeed turning whitish, and the skin has taken on a different texture.  It is not more painful, perhaps a bit more numb.  Occurring for 6 months.  Sometimes it can feel numb.  There could be some burning medially up to the groin    She does see a new neurologist the end of this week to review concerns about her leg dragging.    She has not been sleeping well, is using trazodone 300 mg at night.    Ritalin has been less effective further cognitive concerns since her concussion.  Recalls she used Adderall in the past for ADD and tolerated better.    She has been continue to try to decrease her fentanyl patch now down to 50 mcg extended every 3 days.  She reviews the cost of the 37.5 mcg is quite expensive.  We discussed could use a 25 mcg and a 12.  Again she is concerned about that cost of think she could just go to 25 mcg that is her goal.  Not particular concerned about withdrawal symptoms.  Daughter however notes she appears more fidgety.  Sleep could be an issue.    There is some discussion whether she has shingles or other condition next time she has a flareup they will biopsy the lesions.    She was referred to  By Dr. Rees for physical therapy.  She reviews when she works with her present physical therapist it \"takes almost an hour just to clear out my brain\".    She has been ordered for an MRI of her low back.    Reviews the last radiofrequency ablation she had in her neck seem to be very painful and she is apprehensive of doing a lumbar medial branch block.      Current Outpatient Medications:      amphetamine-dextroamphetamine (ADDERALL) 10 MG tablet, Take 1 tablet (10 mg) by mouth 2 times daily Morning and " noon, Disp: 60 tablet, Rfl: 0     apixaban ANTICOAGULANT (ELIQUIS) 5 MG tablet, Take 1 tablet (5 mg) by mouth 2 times daily, Disp: 180 tablet, Rfl: 3     azelastine (ASTEPRO) 0.15 % nasal spray, Spray 2 sprays into both nostrils 2 times daily , Disp: , Rfl:      Butalbital-Acetaminophen (PHRENILIN)  MG TABS per tablet, Take 1 tablet by mouth daily as needed for headaches, Disp: 15 tablet, Rfl: 1     carvedilol (COREG) 12.5 MG tablet, Take 12.5 mg by mouth 2 times daily (with meals) , Disp: , Rfl:      fentaNYL (DURAGESIC) 12 mcg/hr 72 hr patch, Place 1 patch onto the skin every 72 hours remove old patch., Disp: 5 patch, Rfl: 0     fentaNYL (DURAGESIC) 25 mcg/hr 72 hr patch, Place 1 patch onto the skin every 72 hours remove old patch., Disp: 10 patch, Rfl: 0     fentaNYL (DURAGESIC) 50 mcg/hr 72 hr patch, Place 1 patch onto the skin every 48 hours remove old patch., Disp: 15 patch, Rfl: 0     furosemide (LASIX) 40 MG tablet, Take 1 tablet (40 mg) by mouth daily, Disp: 90 tablet, Rfl: 1     hydrOXYzine (VISTARIL) 25 MG capsule, Take 1 capsule (25 mg) by mouth nightly as needed for anxiety (and insomnia), Disp: 30 capsule, Rfl: 0     ketamine HCl POWD, 40 mg lozenge, one-half 4 times a day, Disp: 60 g, Rfl: 1     levothyroxine (SYNTHROID/LEVOTHROID) 50 MCG tablet, TAKE ONE TABLET BY MOUTH EVERY DAY, Disp: 90 tablet, Rfl: 2     lisinopril (ZESTRIL) 40 MG tablet, Take 40 mg by mouth At Bedtime , Disp: , Rfl:      methylphenidate (RITALIN) 10 MG tablet, One tablet morning, noon, anf 4 pm, Disp: 90 tablet, Rfl: 0     mirtazapine (REMERON) 15 MG tablet, One-half to one tab bedtime, Disp: 30 tablet, Rfl: 1     naloxone (NARCAN) 4 MG/0.1ML nasal spray, Spray 1 spray (4 mg) into one nostril alternating nostrils once as needed for opioid reversal every 2-3 minutes until assistance arrives, Disp: 0.2 mL, Rfl: 0     PRALUENT 75 MG/ML injectable pen, INJECT 75MG UNDER THE SKIN EVERY 14 DAYS, Disp: 12 mL, Rfl: 4      "rosuvastatin (CRESTOR) 40 MG tablet, Take 40 mg by mouth At Bedtime, Disp: , Rfl:      senna (SENNA-TIME) 8.6 MG tablet, Take 1-3 tablets by mouth daily as needed , Disp: , Rfl:      SUMAtriptan (IMITREX) 50 MG tablet, Take 50 mg by mouth at onset of headache , Disp: , Rfl:      traZODone (DESYREL) 100 MG tablet, Take 3-4 tablets (300-400 mg) by mouth At Bedtime, Disp: 360 tablet, Rfl: 1     valACYclovir (VALTREX) 1000 mg tablet, TAKE ONE TABLET BY MOUTH THREE TIMES A DAY FOR 7 DAYS, AS NEEDED FOR OUTBREAKS, Disp: , Rfl:     On exam she is alert with a clear sensorium good eye contact.  Thought process logical.  Appropriately groomed.    She notes had her scalp cauterized it is healed.    Each thigh indeed has an area with whitish discoloration and a thicker texture.    Review of Systems         Objective    /68   Pulse 76   Ht 1.613 m (5' 3.5\")   Wt 69.4 kg (153 lb)   BMI 26.68 kg/m    Body mass index is 26.68 kg/m .  Physical Exam       Assessment reviewed the 6 months present of this unusual weight discoloration and change of the skin texture in an L3 distribution bilaterally, not particularly painful.  She will see a neurologist this week.  We will inquire the opinion and whether a dermatologist should also be followed.    She reviews her back pain has been more of an issue than radicular pain.  She does have facet arthropathy.  We discussed a facet block may be more helpful than the steroid injection presently.  She is very worried about the process of medial branch blocks before doing radiofrequency ablation.    Plan: We discussed for tapering the fentanyl will prescribe 25 mcg and 12 mcg patches, she will determine whether she wants to use the cost of a micrograms versus alternating 25 and 50 mcg patches every 3 days till reaches a more steady state.    We will change the Ritalin to Adderall.    Total time more than 25 minutes          "

## 2021-12-16 NOTE — TELEPHONE ENCOUNTER
Left message to call back for: Sandy  Information to relay to patient: Please relay message below

## 2021-12-16 NOTE — TELEPHONE ENCOUNTER
----- Message from Caitlyn Rees MD sent at 12/13/2021  2:38 PM CST -----  Please call patient and let her know that her MRI does show some narrowing of the spinal canal in the neck as well as arthritis, and it is worse than in 2016. She could certainly see my spine doctors to see if there is anything that they could do to help with the pain, such as an injection.

## 2021-12-16 NOTE — LETTER
12/16/2021         RE: Sandy Spencer  [address]  [facility] [address]        Dear Colleague,    Thank you for referring your patient, Sandy Spencer, to the [facility]. Please see a copy of my visit note below.    Psychology Progress Note    Date: December 16, 2021    Time length and type of treatment: 48 minutes (10:00 AM to 10:48 AM), individual therapy    After review of the patient's situation, this visit was changed from an in-person visit to a  video visit via [employer] and [employer] when connectivity problems required switching platforms, to reduce the risk of COVID 19 exposure. Patient was informed that policies and procedures that govern in-person sessions would also apply to  video sessions. Patient was also informed that  video sessions would be discontinued when COVID 19 exposure is no longer a concern (as determined by [facility]).     Patient location: Patient home in [place], [place]  Provider location:  [facility], [place], [place]    Patient was in agreement with proceeding with a  video session.      Necessity: This session is necessary to address the patient's PTSD, anxiety, and depressed mood.  Today we focus on revising the patient's treatment plan. The reader is invited to review the patient's full treatment plan in the Media section of the patient's Epic medical record.    Psychotherapeutic Technique: This writer utilized motivational interviewing, active listening, reassurance and support in the context of cognitive behavioral therapy to address the above.      MENTAL STATUS EVALUATION  Grooming: Well-groomed  Attire: Appropriate  Age: Appears Stated  Behavior Towards Examiner: Cooperative  Motor Activity: Within normal limits  Eye Contact: Avoidant  Mood: Anxious  Depressed   Affect: full range  Speech/Language: Mildly pressured  Attention: Easily distracted  Concentration: Brief  Thought Process:  "tangential  Thought Content: Clear    Orientation: Appeared oriented to person, place, and time, though not formally established  Memory: No evidence of impairment.  Judgement: Adequate  Estimated Intelligence: Average  Demonstrated Insight: Adequate  Fund of Knowledge: Adequate    Intervention:   Patient was readministered the PHQ-9 and KT-7 as part of revising her treatment plan. Her score of 4 on the KT-7 is suggestive of minimal anxiety while her score of 8 on the PHQ-9 is suggestive of mild depression.  At this time, existing diagnoses will be maintained and patient treatment plan was updated.      Patient reported that she had an argument with her sister because the patient's granddaughter is a hoarder, uses illegal substances in addition to having an alcohol use disorder, is involved in a relationship with a man who allegedly sells drugs, has allowed her sons to smoke marijuana in her presence and may even be supplying the marijuana, and the patient's sister feels that the children are not safe due to neglect.  Patient agreed that the children are not safe, stating their living conditions are unsafe, she questions if there is adequate food, and she believes her granddaughter's drug and alcohol use interferes with her ability to parent, but she does not want to be the \"bad samantha\" who files a report, and feels her sister should file a report since she has more frequent and recent contact with the children.  Patient was reminded that I am a mandated .  She expressed relief that I will be filing a report and provided contact phone numbers for her granddaughter and her sister.      Because the children live in [place], I spoke with an [facility] Child Protection Screener at 12:21 PM who stated a report should be filed. I filed an electronic report at 12:40 PM.     Progress:  Patient's treatment plan was jointly revised.    Plan:   We will meet again in 1 week to address the patient's PTSD, anxiety, and " depressed mood.  Estimated duration of treatment is 10+ individual therapy sessions (36968) at twice monthly intervals. Treatment is expected to be completed by September 2022.     Diagnosis:  Adjustment Disorder with mixed anxiety and depressed mood  Posttraumatic Stress Disorder (PTSD)  Attention-Deficit/Hyperactivity Disorder, combined presentation      Again, thank you for allowing me to participate in the care of your patient.        Sincerely,        Deann Meeks Psy.D, LP

## 2021-12-17 ENCOUNTER — TRANSFERRED RECORDS (OUTPATIENT)
Dept: HEALTH INFORMATION MANAGEMENT | Facility: CLINIC | Age: 81
End: 2021-12-17

## 2021-12-17 ENCOUNTER — MEDICAL CORRESPONDENCE (OUTPATIENT)
Dept: HEALTH INFORMATION MANAGEMENT | Facility: CLINIC | Age: 81
End: 2021-12-17

## 2021-12-17 ENCOUNTER — HOSPITAL ENCOUNTER (OUTPATIENT)
Dept: PHYSICAL THERAPY | Facility: REHABILITATION | Age: 81
End: 2021-12-17
Attending: FAMILY MEDICINE
Payer: MEDICARE

## 2021-12-17 DIAGNOSIS — G89.4 CHRONIC PAIN SYNDROME: ICD-10-CM

## 2021-12-17 DIAGNOSIS — M54.16 LUMBAR RADICULOPATHY: ICD-10-CM

## 2021-12-17 PROCEDURE — 97112 NEUROMUSCULAR REEDUCATION: CPT | Mod: GP | Performed by: PHYSICAL THERAPIST

## 2021-12-20 RX ORDER — HYDROXYZINE PAMOATE 25 MG/1
25 CAPSULE ORAL
Qty: 30 CAPSULE | Refills: 3 | Status: SHIPPED | OUTPATIENT
Start: 2021-12-20 | End: 2022-01-12

## 2021-12-21 ENCOUNTER — TRANSFERRED RECORDS (OUTPATIENT)
Dept: HEALTH INFORMATION MANAGEMENT | Facility: CLINIC | Age: 81
End: 2021-12-21

## 2021-12-21 ENCOUNTER — HOSPITAL ENCOUNTER (OUTPATIENT)
Dept: PHYSICAL THERAPY | Facility: REHABILITATION | Age: 81
End: 2021-12-21
Payer: MEDICARE

## 2021-12-21 DIAGNOSIS — G89.4 CHRONIC PAIN SYNDROME: ICD-10-CM

## 2021-12-21 DIAGNOSIS — M54.16 LUMBAR RADICULOPATHY: Primary | ICD-10-CM

## 2021-12-21 DIAGNOSIS — G90.523 COMPLEX REGIONAL PAIN SYNDROME TYPE 1 OF BOTH LOWER EXTREMITIES: ICD-10-CM

## 2021-12-21 DIAGNOSIS — M79.18 MYOFASCIAL PAIN: ICD-10-CM

## 2021-12-21 PROCEDURE — 97140 MANUAL THERAPY 1/> REGIONS: CPT | Mod: GP | Performed by: PHYSICAL THERAPIST

## 2021-12-21 PROCEDURE — 97112 NEUROMUSCULAR REEDUCATION: CPT | Mod: GP | Performed by: PHYSICAL THERAPIST

## 2021-12-21 NOTE — TELEPHONE ENCOUNTER
TC relayed message. Patient stated she is trying to get into a neurologist but isn't able to get an appointment until summer of 2022.    - Patient wanting to let provider know she had 2 Biopsys done.     - Can Dr. Rees make a neurology referral for her?     Please contact patient.      Thank you,  ..DWIGHT Davis

## 2021-12-23 ENCOUNTER — VIRTUAL VISIT (OUTPATIENT)
Dept: NEUROLOGY | Facility: CLINIC | Age: 81
End: 2021-12-23
Payer: MEDICARE

## 2021-12-23 ENCOUNTER — VIRTUAL VISIT (OUTPATIENT)
Dept: PHARMACY | Facility: CLINIC | Age: 81
End: 2021-12-23
Payer: COMMERCIAL

## 2021-12-23 DIAGNOSIS — E78.5 HYPERLIPIDEMIA LDL GOAL <70: ICD-10-CM

## 2021-12-23 DIAGNOSIS — G47.00 INSOMNIA, UNSPECIFIED TYPE: ICD-10-CM

## 2021-12-23 DIAGNOSIS — G89.4 CHRONIC PAIN SYNDROME: Primary | ICD-10-CM

## 2021-12-23 DIAGNOSIS — F06.4 ANXIETY DISORDER DUE TO MEDICAL CONDITION: ICD-10-CM

## 2021-12-23 DIAGNOSIS — F43.23 ADJUSTMENT DISORDER WITH MIXED ANXIETY AND DEPRESSED MOOD: Primary | ICD-10-CM

## 2021-12-23 PROCEDURE — 90834 PSYTX W PT 45 MINUTES: CPT | Mod: 95 | Performed by: PSYCHOLOGIST

## 2021-12-23 PROCEDURE — 99607 MTMS BY PHARM ADDL 15 MIN: CPT | Performed by: PHARMACIST

## 2021-12-23 PROCEDURE — 99606 MTMS BY PHARM EST 15 MIN: CPT | Performed by: PHARMACIST

## 2021-12-23 NOTE — PROGRESS NOTES
Psychology Progress Note    Date: December 23, 2021    Time length and type of treatment: 46 minutes (10:17 AM - 11:03 AM), individual therapy    After review of the patient's situation, this visit was changed from an in-person visit to a telephone visit to reduce the risk of COVID 19 exposure. Technological issues interfered with an attempted video visit, requiring the switch to telephone. Patient was informed that policies and procedures that govern in-person sessions would also apply to telephone sessions. Patient was also informed that telephone sessions would be discontinued when COVID 19 exposure is no longer a concern (as determined by [facility]).     Patient location: Patient home in [place], [place]  Provider location:  [facility], [place], [place]    Patient was in agreement with proceeding with a telephone session.      Necessity: This session is necessary to address the patient's anxiety, depressed mood, and PTSD.  Today we focus on the patient's treatment plan, specifically exploring barriers to compliance with medical treatment plan.  The reader is invited to review the patient's full treatment plan in the Media section of the patient's Epic medical record.    Psychotherapeutic Technique: This writer utilized motivational interviewing, active listening, reassurance and support in the context of cognitive behavioral therapy to address the above.      MENTAL STATUS EVALUATION  Grooming: Unable to determine due to telephone visit  Attire: Unable to determine due to telephone visit  Age: Unable to determine due to telephone visit  Behavior Towards Examiner: Cooperative  Motor Activity: Unable to determine due to telephone visit  Eye Contact: Unable to determine due to telephone visit  Mood: Anxious   Affect: full range  Speech/Language: normal  Attention: Normal  Concentration: Sufficient  Thought Process: tangential  Thought Content: Clear  Does not seem to be  responding to internal stimuli   Orientation: Appeared oriented to person, place, and time, though not formally established  Memory: No evidence of impairment.  Judgement: Fair  Estimated Intelligence: Average  Demonstrated Insight: Fair  Fund of Knowledge: Adequate    Intervention:   Patient reported both frustration and confusion over recent medication changes, with lengthy waits to see specialists, and with problems obtaining referrals. She reported previously having a very helpful conversation with a pharmacist, and was supported in reconnecting with her pharmacist for further information.  She was encouraged to remain appropriately assertive with pursuing care and we discussed patient right to understand healthcare decisions.  Patient noted she sometimes thinks she was on a medication previously and it didn't work for her, but struggles to remember whether it was discontinued because of side effects, a lack of efficacy, or for some other reason. We discussed a plan for patient to begin maintaining a record of her medications and side effects in order to help her to communicate effectively with providers going forward. Patient was also taught a diaphragmatic breathing strategy and provided psychoeducation related to anxiety management.  Patient was informed that the child protection report discussed last session has been filed, and was prepared for the possibility that child protection will not follow through with an investigation.      Progress:  Patient has shown improvement in ability to utilize coping skills.     Plan:   We will meet again in 1 week to address the patient's PTSD, anxiety, and depressed mood.  Estimated duration of treatment is 10+ individual therapy sessions (38320) at twice monthly intervals. Treatment is expected to be completed by September 2022.     Diagnosis:  Adjustment Disorder with mixed anxiety and depressed mood  Posttraumatic Stress Disorder (PTSD)  Attention-Deficit/Hyperactivity  Disorder, combined presentation

## 2021-12-23 NOTE — LETTER
12/23/2021         RE: [place]  [address]  [facility] [address]        Dear Colleague,    Thank you for referring your patient, [place], to the [facility]. Please see a copy of my visit note below.    Psychology Progress Note    Date: December 23, 2021    Time length and type of treatment: 46 minutes (10:17 AM - 11:03 AM), individual therapy    After review of the patient's situation, this visit was changed from an in-person visit to a telephone visit to reduce the risk of COVID 19 exposure. Technological issues interfered with an attempted video visit, requiring the switch to telephone. Patient was informed that policies and procedures that govern in-person sessions would also apply to telephone sessions. Patient was also informed that telephone sessions would be discontinued when COVID 19 exposure is no longer a concern (as determined by [facility]).     Patient location: Patient home in [place], [place]  Provider location:  [facility], [place], [place]    Patient was in agreement with proceeding with a telephone session.      Necessity: This session is necessary to address the patient's anxiety, depressed mood, and PTSD.  Today we focus on the patient's treatment plan, specifically exploring barriers to compliance with medical treatment plan.  The reader is invited to review the patient's full treatment plan in the Media section of the patient's Epic medical record.    Psychotherapeutic Technique: This writer utilized motivational interviewing, active listening, reassurance and support in the context of cognitive behavioral therapy to address the above.      MENTAL STATUS EVALUATION  Grooming: Unable to determine due to telephone visit  Attire: Unable to determine due to telephone visit  Age: Unable to determine due to telephone visit  Behavior Towards Examiner: Cooperative  Motor Activity: Unable to determine due to  telephone visit  Eye Contact: Unable to determine due to telephone visit  Mood: Anxious   Affect: full range  Speech/Language: normal  Attention: Normal  Concentration: Sufficient  Thought Process: tangential  Thought Content: Clear  Does not seem to be responding to internal stimuli   Orientation: Appeared oriented to person, place, and time, though not formally established  Memory: No evidence of impairment.  Judgement: Fair  Estimated Intelligence: Average  Demonstrated Insight: Fair  Fund of Knowledge: Adequate    Intervention:   Patient reported both frustration and confusion over recent medication changes, with lengthy waits to see specialists, and with problems obtaining referrals. She reported previously having a very helpful conversation with a pharmacist, and was supported in reconnecting with her pharmacist for further information.  She was encouraged to remain appropriately assertive with pursuing care and we discussed patient right to understand healthcare decisions.  Patient noted she sometimes thinks she was on a medication previously and it didn't work for her, but struggles to remember whether it was discontinued because of side effects, a lack of efficacy, or for some other reason. We discussed a plan for patient to begin maintaining a record of her medications and side effects in order to help her to communicate effectively with providers going forward. Patient was also taught a diaphragmatic breathing strategy and provided psychoeducation related to anxiety management.  Patient was informed that the child protection report discussed last session has been filed, and was prepared for the possibility that child protection will not follow through with an investigation.      Progress:  Patient has shown improvement in ability to utilize coping skills.     Plan:   We will meet again in 1 week to address the patient's PTSD, anxiety, and depressed mood.  Estimated duration of treatment is 10+ individual  therapy sessions (48890) at twice monthly intervals. Treatment is expected to be completed by September 2022.     Diagnosis:  Adjustment Disorder with mixed anxiety and depressed mood  Posttraumatic Stress Disorder (PTSD)  Attention-Deficit/Hyperactivity Disorder, combined presentation      Again, thank you for allowing me to participate in the care of your patient.        Sincerely,        Deann Meeks Psy.D, LP

## 2021-12-23 NOTE — PROGRESS NOTES
Medication Therapy Management (MTM) Encounter    ASSESSMENT:                            Chronic pain: Continue to follow with the pain clinic.  She is on 37.5 mcg every 72 hours now.  See below regarding hydroxyzine, it may help with her possible withdrawal symptoms.  She will continue to follow-up with Dr. Lynn.    Hyperlipidemia/PAD:  Continue current regimen of Praluent and Crestor 40 mg nightly with closely monitoring of lipids.  I will look into a Praluent patient assistance program for her for 2022.    Depression/anxiety: She is now on Adderall, but has only taken it a few days therefore she has not seen any difference.  However, it does appear that her sleep is influencing her fatigue during the day, therefore we will see if the Adderall works well for her in addition to the hydroxyzine which helps her sleep.    Insomnia: Okay to continue hydroxyzine per PCP, and lower dose of trazodone.  She will discuss this further with Dr. Lynn.    PLAN:                            1.  I will look into Praluent assistance program     Follow-up: 2-week phone follow-up     SUBJECTIVE/OBJECTIVE:                          Sandy [place] is a 81 year old female called for a follow up visit. She was referred from Dr. Rees for polypharmacy.     Reason for visit: Follow up since we last spoke on 12/6/2021  Medication Adherence/Access:  She used to have home care through [email or web address] but was discharged.  More recently her friend (an RN) has been setting up her medicines for her. Certain oral medications goes right through her -- has 9 inches of her colon. Would like that considered for her medications. She is in the donut hole now. She is using [employer] coupon at [employer] for some of her medicines.     Chronic pain: Follows with the pain clinic. Currently on fentanyl 25 mcg + 12 mcg every 72 hours (decreased from 50 mcg patch every 48 hours).  She would like to be off the fentanyl patch eventually and would like to continue to  taper off.  Reports that she gets a bit agitated before her next patch, but she wonders if that is due to lack of sleep.  Also the winter is a bad time of year for her.  Ketamine 40 mg lozenge half (20 mg) four times daily started on 11/4/21.  Using for extreme pain. She thinks it has been helpful so far, but she does not like the taste since it is a very strong peppermint flavor.  She also is unable to cut it in half, therefore she sucks on and saves the other half.   Patient has Narcan at home    Hyperlipidemia/PAD: Currently taking Praluent 75 mg every 14 days and rosuvastatin 40 mg daily.  Denies myalgias. She saw Dr. Ybarra on 11/11. Last lipids checked 9/17/2021.  She notes some concern about the price of Praluent, it is $60 for her but she is on a fixed income.    Depression/anxiety: She met with Dr. Lynn and nortriptyline was tapered down and methylphenidate started. On 12/15/21 she was changed to adderall 10 mg twice daily (AM and noon) due to feeling like it worked better in the past.  She has not noticed a huge difference so far, but she only started it 2 days ago.  She did have to take a nap, but she thinks that is more so due to lack of sleep.  She is no longer on nortriptyline.  She feels more agitated off lamotrigine, but thinks it is the change in weather.   She is no longer taking BuSpar -- she feels that contributed to her recent fall. She is anxious due to all her medical issues. She is diagnosed with ADHD and PTSD. She does not have nightmares. No suicidal thoughts or panic attacks.    BP Readings from Last 3 Encounters:   12/15/21 123/68   12/15/21 98/58   11/30/21 110/68     Insomnia: Taking trazodone 100 mg 2-4 tablets nightly.  No longer on nortriptyline.  At last [facility] appointment I gave her hydroxyzine pamoate 25 mg nightly and she slept for 9 hours.  She loved it, but Dr. Lynn recommended that she try the mirtazapine instead.  The mirtazapine was not helpful for her, therefore she plans  on switching back to hydroxyzine.  She received a refill from PCP.  With the hydroxyzine she did not feel hung over or groggy in the morning.  She also has backed off on trazodone when using the hydroxyzine.      Today's Vitals: There were no vitals taken for this visit.  ----------------    Allergies/ADRs: Reviewed in chart  Past Medical History: Reviewed in chart  Tobacco: She reports that she quit smoking about 21 years ago. She has a 20.00 pack-year smoking history. She has never used smokeless tobacco.  Alcohol: Reviewed in chart    I spent 30 minutes with this patient today. All changes were made via collaborative practice agreement with Caitlyn Rees MD. A copy of the visit note was provided to the patient's primary care provider.    The patient declined a summary of these recommendations.     Darlin Elise, Pharm.D., [facility]   Medication Therapy Management Pharmacist   [facility]     Telemedicine Visit Details  Type of service:  Telephone visit  Start Time: 3:09 PM  End Time: 3:39 PM  Originating Location (patient location): Home  Distant Location (provider location):  [facility]     Medication Therapy Recommendations                Hyperlipidemia LDL goal <70    Current Medication: PRALUENT 75 MG/ML injectable pen   Rationale: Cannot afford medication product - Cost - Adherence   Recommendation: Referral to Service    Status: Patient Agreed - Adherence/Education

## 2021-12-27 NOTE — TELEPHONE ENCOUNTER
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 She is being seen for botox too.  The Biopsies were done at [facility], from her legs. Where the loss of pigment is.  They are recommending she see a Rheumatologist. She would like to see a provider in the [facility] system.  Please place order for Rheumatology.  Writer gave the number to patient.[phone].

## 2021-12-28 ENCOUNTER — TRANSCRIBE ORDERS (OUTPATIENT)
Dept: MULTI SPECIALTY CLINIC | Facility: CLINIC | Age: 81
End: 2021-12-28
Payer: MEDICARE

## 2021-12-28 ENCOUNTER — HOSPITAL ENCOUNTER (OUTPATIENT)
Dept: MRI IMAGING | Facility: HOSPITAL | Age: 81
Discharge: HOME | End: 2021-12-28
Attending: FAMILY MEDICINE | Admitting: FAMILY MEDICINE
Payer: MEDICARE

## 2021-12-28 DIAGNOSIS — M50.30 DEGENERATION OF CERVICAL INTERVERTEBRAL DISC: ICD-10-CM

## 2021-12-28 DIAGNOSIS — M54.16 LUMBAR RADICULOPATHY: ICD-10-CM

## 2021-12-28 DIAGNOSIS — M79.662 PAIN IN BOTH LOWER LEGS: Primary | ICD-10-CM

## 2021-12-28 DIAGNOSIS — M79.661 PAIN IN BOTH LOWER LEGS: Primary | ICD-10-CM

## 2021-12-28 PROCEDURE — 72148 MRI LUMBAR SPINE W/O DYE: CPT | Mod: ME

## 2021-12-28 PROCEDURE — G1004 CDSM NDSC: HCPCS

## 2021-12-29 NOTE — TELEPHONE ENCOUNTER
There is a rheumatology referral in now from the neurologist, someone will call her to get scheduled for rheumatology in the near future.

## 2021-12-30 ENCOUNTER — VIRTUAL VISIT (OUTPATIENT)
Dept: NEUROLOGY | Facility: CLINIC | Age: 81
End: 2021-12-30
Payer: MEDICARE

## 2021-12-30 DIAGNOSIS — F43.23 ADJUSTMENT DISORDER WITH MIXED ANXIETY AND DEPRESSED MOOD: Primary | ICD-10-CM

## 2021-12-30 DIAGNOSIS — M54.2 NECK PAIN: ICD-10-CM

## 2021-12-30 DIAGNOSIS — M54.16 LUMBAR RADICULOPATHY: Primary | ICD-10-CM

## 2021-12-30 PROCEDURE — 90834 PSYTX W PT 45 MINUTES: CPT | Mod: 95 | Performed by: PSYCHOLOGIST

## 2021-12-30 NOTE — PROGRESS NOTES
Psychology Progress Note    Date: December 30, 2021    Time length and type of treatment: 46 minutes (10:00 AM to 10:46 AM), individual therapy    After review of the patient's situation, this visit was changed from an in-person visit to a  video visit via [employer] to reduce the risk of COVID 19 exposure. Patient was informed that policies and procedures that govern in-person sessions would also apply to  video sessions. Patient was also informed that  video sessions would be discontinued when COVID 19 exposure is no longer a concern (as determined by [facility]).     Patient location: Patient home in [place], [place]  Provider location:  [facility], [place], [place]    Patient was in agreement with proceeding with a  video session.      Necessity: This session is necessary to address the patient's anxiety, depressed mood, and PTSD.  Today we focus on the patient's treatment plan, specifically exploring coping strategies.  The reader is invited to review the patient's full treatment plan in the Media section of the patient's Epic medical record.    Psychotherapeutic Technique: This writer utilized motivational interviewing, active listening, reassurance and support in the context of cognitive behavioral therapy to address the above.      MENTAL STATUS EVALUATION  Grooming: Well groomed  Attire: Appropriate  Age: Appears Stated  Behavior Towards Examiner: Cooperative  Motor Activity: Within normal limits  Eye Contact: Appropriate  Mood: Depressed   Affect: full range  Speech/Language: normal  Attention: Normal  Concentration: Sufficient  Thought Process: unremarkable  Thought Content: Clear    Orientation: Appeared oriented to person, place, and time, though not formally established  Memory: No evidence of impairment.  Judgement: Adequate  Estimated Intelligence: Average  Demonstrated Insight: Adequate  Fund of Knowledge: Adequate    Intervention:   Patient reported that pain  has been extremely distressing and while she expressed commitment to continuing to reduce Fentanyl, she acknowledged increased depression as she lexi with fentanyl withdrawal.  Patient was provided psychoeducation related to pain and we discussed pain management strategies including mindfulness and distraction.  Patient expressed a commitment to practicing mindfulness exercises that were reviewed.     Progress:  Patient demonstrated increased commitment to practicing pain management skills.    Plan:   We will meet again in 1 week to address the patient's anxiety, depressed mood, and PTSD.  Estimated duration of treatment is 10+ individual therapy sessions (12513) at twice monthly intervals. Treatment is expected to be completed by September 2022.     Diagnosis:  Adjustment Disorder with mixed anxiety and depressed mood  Posttraumatic Stress Disorder (PTSD)  Attention-Deficit/Hyperactivity Disorder, combined presentation

## 2021-12-30 NOTE — PROGRESS NOTES
Please let the patient know that the spine clinic referral was placed, they will call her to get her scheduled.

## 2021-12-30 NOTE — LETTER
12/30/2021         RE: [place]  [address]  [place] [facility] [place] [address]        Dear Colleague,    Thank you for referring your patient, [place], to the [facility]. Please see a copy of my visit note below.    Psychology Progress Note    Date: December 30, 2021    Time length and type of treatment: 46 minutes (10:00 AM to 10:46 AM), individual therapy    After review of the patient's situation, this visit was changed from an in-person visit to a  video visit via [employer] to reduce the risk of COVID 19 exposure. Patient was informed that policies and procedures that govern in-person sessions would also apply to  video sessions. Patient was also informed that  video sessions would be discontinued when COVID 19 exposure is no longer a concern (as determined by [facility]).     Patient location: Patient home in [place], [place]  Provider location:  [facility], [place], [place]    Patient was in agreement with proceeding with a  video session.      Necessity: This session is necessary to address the patient's anxiety, depressed mood, and PTSD.  Today we focus on the patient's treatment plan, specifically exploring coping strategies.  The reader is invited to review the patient's full treatment plan in the Media section of the patient's Epic medical record.    Psychotherapeutic Technique: This writer utilized motivational interviewing, active listening, reassurance and support in the context of cognitive behavioral therapy to address the above.      MENTAL STATUS EVALUATION  Grooming: Well groomed  Attire: Appropriate  Age: Appears Stated  Behavior Towards Examiner: Cooperative  Motor Activity: Within normal limits  Eye Contact: Appropriate  Mood: Depressed   Affect: full range  Speech/Language: normal  Attention: Normal  Concentration: Sufficient  Thought Process: unremarkable  Thought Content: Clear    Orientation: Appeared  oriented to person, place, and time, though not formally established  Memory: No evidence of impairment.  Judgement: Adequate  Estimated Intelligence: Average  Demonstrated Insight: Adequate  Fund of Knowledge: Adequate    Intervention:   Patient reported that pain has been extremely distressing and while she expressed commitment to continuing to reduce Fentanyl, she acknowledged increased depression as she lexi with fentanyl withdrawal.  Patient was provided psychoeducation related to pain and we discussed pain management strategies including mindfulness and distraction.  Patient expressed a commitment to practicing mindfulness exercises that were reviewed.     Progress:  Patient demonstrated increased commitment to practicing pain management skills.    Plan:   We will meet again in 1 week to address the patient's anxiety, depressed mood, and PTSD.  Estimated duration of treatment is 10+ individual therapy sessions (29751) at twice monthly intervals. Treatment is expected to be completed by September 2022.     Diagnosis:  Adjustment Disorder with mixed anxiety and depressed mood  Posttraumatic Stress Disorder (PTSD)  Attention-Deficit/Hyperactivity Disorder, combined presentation      Again, thank you for allowing me to participate in the care of your patient.        Sincerely,        Deann Meeks Psy.D, LP

## 2022-01-04 ENCOUNTER — HOSPITAL ENCOUNTER (OUTPATIENT)
Dept: PHYSICAL THERAPY | Facility: REHABILITATION | Age: 82
End: 2022-01-04
Payer: MEDICARE

## 2022-01-04 DIAGNOSIS — G89.4 CHRONIC PAIN SYNDROME: ICD-10-CM

## 2022-01-04 DIAGNOSIS — M54.16 LUMBAR RADICULOPATHY: Primary | ICD-10-CM

## 2022-01-04 DIAGNOSIS — G90.523 COMPLEX REGIONAL PAIN SYNDROME TYPE 1 OF BOTH LOWER EXTREMITIES: ICD-10-CM

## 2022-01-04 DIAGNOSIS — M79.18 MYOFASCIAL PAIN: ICD-10-CM

## 2022-01-04 PROCEDURE — 97112 NEUROMUSCULAR REEDUCATION: CPT | Mod: GP | Performed by: PHYSICAL THERAPIST

## 2022-01-04 PROCEDURE — 97140 MANUAL THERAPY 1/> REGIONS: CPT | Mod: GP | Performed by: PHYSICAL THERAPIST

## 2022-01-04 NOTE — PROGRESS NOTES
[facility]    OUTPATIENT PHYSICAL THERAPY  PLAN OF TREATMENT FOR OUTPATIENT REHABILITATION AND PROGRESS NOTE           Patient's Last Name, First Name, Sandy Moss Date of Birth  1940   Provider's Name  [facility] Medical Record No.  [id number]    Onset Date  7/13/18 Start of Care Date  7/13/18   Type:     _X_PT   ___OT   ___SLP Medical Diagnosis  CRPS   PT Diagnosis  CRPS Plan of Treatment  Frequency/Duration: 1x/week  Certification date from 1/1/22 to 4/2/22     Goals:  Goal Identifier     Goal Description Patient will have improved quality of sleep/pain and waking times in the night.  8 weeks   Target Date     Date Met      Progress (detail required for progress note): she has improved but this can be altered by her meds     Goal Identifier     Goal Description Patient will be able to walk 30 minutes on the an incline surfaces for 1/2 mile with less pain and difficulty for community mobility exercise, recreation in 12 weeks   Target Date     Date Met      Progress (detail required for progress note): she is able to walk for greater distances currently even with what seems like more pain with the weather changes.      Goal Identifier     Goal Description Patient will transfer sit to stand, supine to sit floor to stand for toileting, bed mobility and chair transfers with decreased pain 1-2/10 level of intensity 4 weeks   Target Date     Date Met      Progress (detail required for progress note): she is able to transfer without much of an increased pain level.      Goal Identifier     Goal Description     Target Date     Date Met      Progress (detail required for progress note):       Goal Identifier     Goal Description     Target Date     Date Met      Progress (detail required for progress note):       Goal Identifier     Goal Description      Target Date     Date Met      Progress (detail required for progress note):       Goal Identifier     Goal Description     Target Date     Date Met      Progress (detail required for progress note):       Goal Identifier     Goal Description     Target Date     Date Met      Progress (detail required for progress note):              I CERTIFY THE NEED FOR THESE SERVICES FURNISHED UNDER        THIS PLAN OF TREATMENT AND WHILE UNDER MY CARE     (Physician co-signature of this document indicates review and certification of the therapy plan).                Referring Provider: DO ANDREY Canas, PT         01/04/22 1100   Signing Clinician's Name / Credentials   Signing clinician's name / credentials Andrey Molina PT   Session Number   Session Number 71   Progress Note/Recertification   Recertification Due Date 04/02/22   Ortho Goal 1   Goal Description Patient will have improved quality of sleep/pain and waking times in the night.  8 weeks   Goal Progress she has improved but this can be altered by her meds   Ortho Goal 2   Goal Description Patient will be able to walk 30 minutes on the an incline surfaces for 1/2 mile with less pain and difficulty for community mobility exercise, recreation in 12 weeks   Goal Progress she is able to walk for greater distances currently even with what seems like more pain with the weather changes.    Ortho Goal 3   Goal Description Patient will transfer sit to stand, supine to sit floor to stand for toileting, bed mobility and chair transfers with decreased pain 1-2/10 level of intensity 4 weeks   Goal Progress she is able to transfer without much of an increased pain level.    Subjective Report   Subjective Report She just heard today that her injections for tomorrow are cancelled due to insurance reasons. She really does feel like her exercises are helping her. She was able to walk today for nearly 45' at [facility] today and that did pretty good for her. She  did find out that some meds she is on she does sleep longer than she wants. If she doesn't take it she doesn't sleep that well. Last night it was 2-4 hours without taking her meds but with them she is sleeping 10-12 hours. She is really careful with her transfers but it isn't that painful for her.     Objective Measures   Objective Measures Objective Measure 1   Objective Measure 1   Objective Measure MS, art, LV fascail tensions   Neuromuscular Re-education   Neuromuscular re-ed of mvmt, balance, coord, kinesthetic sense, posture, proprioception minutes (72021) 15   Skilled Intervention AA/P/AROM to BLE, ribs, pelvis   Patient Response ex: bridges with knees over bolster, sitting hip isometric add squeezing pillow, seated hip flexion, knee extension, glut isometrics   Manual Therapy   Manual Therapy: Mobilization, MFR, MLD, friction massage minutes (95276) 40   Skilled Intervention Fascial release using Strain-Counterstrain of BLE-SF, B hip-LV, R knee-A/LV   Plan   Plan for next session strengthening for core-review/add,  manual therapy to decrease fascial tension/tone to normalize ROM/decrease inflammation/decrease mm tone and improve proprioception.     Comments   Comments She has improved towards her goals at this time. She is overall doing ok but does still deal with her CRPS on a daily basis. She does remain appropriate for skilled PT to reach stated goals.    Total Session Time   Timed Code Treatment Minutes 55   Total Treatment Time (sum of timed and untimed services) 55

## 2022-01-06 ENCOUNTER — OFFICE VISIT (OUTPATIENT)
Dept: PHYSICAL MEDICINE AND REHAB | Facility: CLINIC | Age: 82
End: 2022-01-06
Attending: FAMILY MEDICINE
Payer: MEDICARE

## 2022-01-06 VITALS — HEART RATE: 84 BPM | DIASTOLIC BLOOD PRESSURE: 51 MMHG | SYSTOLIC BLOOD PRESSURE: 95 MMHG

## 2022-01-06 DIAGNOSIS — M54.16 LUMBAR RADICULAR PAIN: ICD-10-CM

## 2022-01-06 DIAGNOSIS — M79.18 MYOFASCIAL PAIN: ICD-10-CM

## 2022-01-06 DIAGNOSIS — M48.061 STENOSIS OF LATERAL RECESS OF LUMBAR SPINE: ICD-10-CM

## 2022-01-06 DIAGNOSIS — M99.05 SOMATIC DYSFUNCTION OF PELVIS REGION: ICD-10-CM

## 2022-01-06 DIAGNOSIS — M51.26 LUMBAR DISC HERNIATION: Primary | ICD-10-CM

## 2022-01-06 DIAGNOSIS — M47.812 CERVICAL SPONDYLOSIS WITHOUT MYELOPATHY: ICD-10-CM

## 2022-01-06 DIAGNOSIS — M99.06 SOMATIC DYSFUNCTION OF LOWER EXTREMITY: ICD-10-CM

## 2022-01-06 DIAGNOSIS — M99.03 SOMATIC DYSFUNCTION OF LUMBAR REGION: ICD-10-CM

## 2022-01-06 DIAGNOSIS — M99.04 SOMATIC DYSFUNCTION OF SACROILIAC JOINT: ICD-10-CM

## 2022-01-06 DIAGNOSIS — M99.02 SOMATIC DYSFUNCTION OF THORACIC REGION: ICD-10-CM

## 2022-01-06 DIAGNOSIS — M54.2 CERVICAL SPINE PAIN: ICD-10-CM

## 2022-01-06 PROCEDURE — 98927 OSTEOPATH MANJ 5-6 REGIONS: CPT | Performed by: PHYSICAL MEDICINE & REHABILITATION

## 2022-01-06 PROCEDURE — 99214 OFFICE O/P EST MOD 30 MIN: CPT | Mod: 25 | Performed by: PHYSICAL MEDICINE & REHABILITATION

## 2022-01-06 ASSESSMENT — PAIN SCALES - GENERAL: PAINLEVEL: SEVERE PAIN (6)
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
## 2022-01-06 NOTE — PATIENT INSTRUCTIONS
1. A Lumbar epidural has been ordered today. Please schedule this injection With Dr Mittal at the pain clinic. On the day of your injection, you cannot be sick or taking antibiotics. If you become sick and are prescribed, please call the clinic so your injection can be rescheduled for once you have completed your antibiotics. You will need to bring a  with you for your injection. If you have any questions or concerns prior to your injection, please do not hesitate to call the nurse navigation line at [phone].    2. Osteopathic manipulation today
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
## 2022-01-11 ENCOUNTER — VIRTUAL VISIT (OUTPATIENT)
Dept: PHARMACY | Facility: CLINIC | Age: 82
End: 2022-01-11
Payer: COMMERCIAL

## 2022-01-11 ENCOUNTER — ANCILLARY PROCEDURE (OUTPATIENT)
Dept: PALLIATIVE MEDICINE | Facility: OTHER | Age: 82
End: 2022-01-11
Payer: MEDICARE

## 2022-01-11 VITALS
SYSTOLIC BLOOD PRESSURE: 120 MMHG | DIASTOLIC BLOOD PRESSURE: 55 MMHG | BODY MASS INDEX: 27.55 KG/M2 | HEART RATE: 71 BPM | WEIGHT: 158 LBS

## 2022-01-11 DIAGNOSIS — F06.4 ANXIETY DISORDER DUE TO MEDICAL CONDITION: ICD-10-CM

## 2022-01-11 DIAGNOSIS — G89.4 CHRONIC PAIN SYNDROME: ICD-10-CM

## 2022-01-11 DIAGNOSIS — E78.5 HYPERLIPIDEMIA LDL GOAL <70: Primary | ICD-10-CM

## 2022-01-11 DIAGNOSIS — G47.00 INSOMNIA, UNSPECIFIED TYPE: ICD-10-CM

## 2022-01-11 DIAGNOSIS — M54.16 LUMBAR RADICULAR PAIN: ICD-10-CM

## 2022-01-11 DIAGNOSIS — M51.26 LUMBAR DISC HERNIATION: ICD-10-CM

## 2022-01-11 DIAGNOSIS — M48.061 STENOSIS OF LATERAL RECESS OF LUMBAR SPINE: ICD-10-CM

## 2022-01-11 PROCEDURE — 250N000009 HC RX 250: Performed by: PAIN MEDICINE

## 2022-01-11 PROCEDURE — 250N000011 HC RX IP 250 OP 636: Performed by: PAIN MEDICINE

## 2022-01-11 PROCEDURE — 64484 NJX AA&/STRD TFRM EPI L/S EA: CPT | Mod: LT | Performed by: PAIN MEDICINE

## 2022-01-11 PROCEDURE — 99607 MTMS BY PHARM ADDL 15 MIN: CPT | Performed by: PHARMACIST

## 2022-01-11 PROCEDURE — 64483 NJX AA&/STRD TFRM EPI L/S 1: CPT | Mod: LT | Performed by: PAIN MEDICINE

## 2022-01-11 PROCEDURE — 255N000002 HC RX 255 OP 636: Performed by: PAIN MEDICINE

## 2022-01-11 PROCEDURE — 99606 MTMS BY PHARM EST 15 MIN: CPT | Performed by: PHARMACIST

## 2022-01-11 RX ORDER — LIDOCAINE HYDROCHLORIDE 10 MG/ML
INJECTION, SOLUTION EPIDURAL; INFILTRATION; INTRACAUDAL; PERINEURAL
Status: COMPLETED | OUTPATIENT
Start: 2022-01-11 | End: 2022-01-11

## 2022-01-11 RX ORDER — BUPIVACAINE HYDROCHLORIDE 2.5 MG/ML
INJECTION, SOLUTION EPIDURAL; INFILTRATION; INTRACAUDAL
Status: COMPLETED | OUTPATIENT
Start: 2022-01-11 | End: 2022-01-11

## 2022-01-11 RX ORDER — DEXAMETHASONE SODIUM PHOSPHATE 10 MG/ML
INJECTION, SOLUTION INTRAMUSCULAR; INTRAVENOUS
Status: COMPLETED | OUTPATIENT
Start: 2022-01-11 | End: 2022-01-11

## 2022-01-11 RX ORDER — METHYLPREDNISOLONE ACETATE 80 MG/ML
INJECTION, SUSPENSION INTRA-ARTICULAR; INTRALESIONAL; INTRAMUSCULAR; SOFT TISSUE
Status: COMPLETED | OUTPATIENT
Start: 2022-01-11 | End: 2022-01-11

## 2022-01-11 RX ADMIN — LIDOCAINE HYDROCHLORIDE 5 ML: 10 INJECTION, SOLUTION EPIDURAL; INFILTRATION; INTRACAUDAL; PERINEURAL at 11:56

## 2022-01-11 RX ADMIN — IOHEXOL 6 ML: 300 INJECTION, SOLUTION INTRAVENOUS at 12:08

## 2022-01-11 RX ADMIN — METHYLPREDNISOLONE ACETATE 80 MG: 80 INJECTION, SUSPENSION INTRA-ARTICULAR; INTRALESIONAL; INTRAMUSCULAR; SOFT TISSUE at 12:00

## 2022-01-11 RX ADMIN — BUPIVACAINE HYDROCHLORIDE 2 ML: 2.5 INJECTION, SOLUTION EPIDURAL; INFILTRATION; INTRACAUDAL at 11:57

## 2022-01-11 RX ADMIN — DEXAMETHASONE SODIUM PHOSPHATE 10 MG: 10 INJECTION, SOLUTION INTRAMUSCULAR; INTRAVENOUS at 11:58

## 2022-01-11 ASSESSMENT — PAIN SCALES - GENERAL
PAINLEVEL: NO PAIN (0)
PAINLEVEL: SEVERE PAIN (6)

## 2022-01-11 NOTE — PROGRESS NOTES
Medication Therapy Management (MTM) Encounter    ASSESSMENT:                            Chronic pain: Continue to follow with the pain clinic.  She is on 37 mcg every 72 hours now.  She will continue to follow-up with Dr. Lynn.  I will ask him about a refill of the fentanyl 12 mcg patch.    Hyperlipidemia/PAD:  Continue current regimen of Praluent and Crestor 40 mg nightly with closely monitoring of lipids.  She is working on the Praluent patient assistance program, and I referred her to Amy Hurtado today for assistance.    Depression/anxiety: She is now on Adderall and has not seen a significant difference.  I will reach out to Dr. Lynn regarding a dose increase.     Insomnia: Okay to continue hydroxyzine per PCP, and lower dose of trazodone.  Will refill for her today and we discussed it would be ok to take two capsules as needed.     PLAN:                            1.  Phone number to [place] pharmacy patient assistance program given today.  2.  I will talk to Dr. Lynn about refilling the fentanyl 12 mcg patch  3.  I will talk to Dr. Lynn about refilling and increasing her Adderall.  4. Ok to take hydroxyzine 1-2 tablets HS if needed - refilled.   5. Phone number provided to [facility] Patient Assistance Program    Follow-up: 3-week phone follow-up     SUBJECTIVE/OBJECTIVE:                          Sandy Spencer is a 81 year old female called for a follow up visit. She was referred from Dr. Rees for polypharmacy.     Reason for visit: Patient would like refills of some of her medicines.  Medication Adherence/Access:  She used to have home care through [facility] but was discharged.  More recently her friend (an RN) has been setting up her medicines for her. Certain oral medications goes right through her -- has 9 inches of her colon. Would like that considered for her medications. She is using [employer] coupon at [facility] for some of her medicines.  Prefers capsules.  She is working on  prescription assistance programs and getting extra help through Medicare.    Chronic pain: Follows with the pain clinic. Currently on fentanyl 25 mcg + 12 mcg every 72 hours (decreased from 50 mcg patch every 48 hours).  She would like to be off the fentanyl patch eventually and would like to continue to taper off.  She needs a refill of the 12 mcg patch since she only got 1 box, but she got 2 boxes of the 25 mcg.  Continues ketamine 40 mg lozenge half (20 mg) four times daily.  Using for extreme pain. She thinks it has been helpful so far, but she does not like the taste since it is a very strong peppermint flavor.  She also is unable to cut it in half, therefore she sucks on and saves the other half.   Patient has Narcan at home  Today she underwent a steroid injection.    Hyperlipidemia/PAD: Currently taking Praluent 75 mg every 14 days and rosuvastatin 40 mg daily.  Denies myalgias. She saw Dr. Ybarra on 11/11. Last lipids checked 9/17/2021.  She  is working on the prescription assistance program for Praluent.    Depression/anxiety: She met with Dr. Lynn and nortriptyline was tapered down and methylphenidate started. On 12/15/21 she was changed to adderall 10 mg twice daily (AM and noon) due to feeling like it worked better in the past.  She has not noticed a huge difference so far, and would like a dose increase.  She has not noticed a big difference from the methylphenidate.    She is no longer on nortriptyline.  She is no longer taking BuSpar -- she feels that contributed to her recent fall.  BP Readings from Last 3 Encounters:   01/11/22 120/55   01/06/22 95/51   12/15/21 123/68       Insomnia: Taking trazodone 100 mg 2-4 tablets nightly and hydroxyzine.  No longer on nortriptyline.  At past [facility] appointment I gave her hydroxyzine pamoate 25 mg nightly and she slept for 9 hours.  She loved it, but Dr. Lynn recommended that she try the mirtazapine instead.  The mirtazapine was not helpful for her,  therefore she plans on continuing hydroxyzine. With the hydroxyzine she did not feel hung over or groggy in the morning.  She also has backed off on trazodone when using the hydroxyzine.  She wonders if she can try a higher dose.      Today's Vitals: There were no vitals taken for this visit.  ----------------    Allergies/ADRs: Reviewed in chart  Past Medical History: Reviewed in chart  Tobacco: She reports that she quit smoking about 21 years ago. She has a 20.00 pack-year smoking history. She has never used smokeless tobacco.  Alcohol: Reviewed in chart    I spent 28 minutes with this patient today. All changes were made via collaborative practice agreement with Caitlyn Rees MD. A copy of the visit note was provided to the patient's primary care provider.    The patient declined a summary of these recommendations.     Darlin Elies, Pharm.D., BCACP   Medication Therapy Management Pharmacist   [facility]     Telemedicine Visit Details  Type of service:  Telephone visit  Start Time: 2:35 PM  End Time: 3:03 PM  Originating Location (patient location): Home  Distant Location (provider location):  [facility]     Medication Therapy Recommendations                Hyperlipidemia LDL goal <70    Current Medication: PRALUENT 75 MG/ML injectable pen   Rationale: Cannot afford medication product - Cost - Adherence   Recommendation: Referral to Service    Status: Patient Agreed - Adherence/Education

## 2022-01-11 NOTE — PROGRESS NOTES
Pre-procedure Intake  If YES to any questions or NO to having a   Please complete laminated checklist and leave on the computer keyboard for Provider, verbally inform provider if able.    For SCS Trial, RFA's or any sedation procedure:  Have you been fasting? NA    If yes, for how long? NA    Are you taking any any blood thinners such as Coumadin, Warfarin, Jantoven, Pradaxa Xarelto, Eliquis, Edoxaban, Enoxaparin, Lovenox, Heparin, Arixtra, Fondaparinux, or Fragmin? OR Antiplatelet medication such as Plavix, Brilinta, or Effient?     If yes, when did you take your last dose? 1/9/2022      Do you take aspirin? NO    Do you have any allergies to contrast dye, iodine, steroid and/or numbing medications: NO    Are you currently taking antibiotics or have an active infection? Taking antibiotics prophylactic for recurrent shingles    Have you had a fever/elevated temperature within the past week? NO    Are you currently taking oral steroids? NO    Do you have a ?  YES    Are you pregnant or breastfeeding? NO    Have you received the COVID-19 vaccine? NO    If yes, was it your 1st, 2nd or only dose needed? NA    Date of most recent vaccine:     NA    Notify provider and RNs if systolic BP >170, diastolic BP >100, P >100 or O2 sats < 90%

## 2022-01-11 NOTE — PATIENT INSTRUCTIONS
[facility]   Procedure Discharge Instructions    Today you saw:    Dr. Mittal    You had a  Left L4-5 and L5-S1 Epidural steroid injection       Medications used:  Lidocaine   Bupivacaine   Dexamethasone Omnipaque  Ropivicaine   Kenalog   Gadolinium  Normal saline               If you were holding your blood thinning medication, please restart taking it:today    Be cautious when walking. Numbness and/or weakness in the lower extremities may occur for up to 6-8 hours after the procedure due to effect of the local anesthetic    Do not drive for 6 hours. The effect of the local anesthetic could slow your reflexes.     You may resume your regular activities after 24 hours    Avoid strenuous activity for the first 24 hours    You may shower, however avoid swimming, tub baths or hot tubs for 24 hours following your procedure    You may have a mild to moderate increase in pain for several days following the injection.    It may take up to 14 days for the steroid medication to start working although you may feel the effect as early as a few days after the procedure.       You may use ice packs for 10-15 minutes, 3 to 4 times a day at the injection site for comfort    Do not use heat to painful areas for 6 to 8 hours. This will give the local anesthetic time to wear off and prevent you from accidentally burning your skin.     Unless you have been directed to avoid the use of anti-inflammatory medications (NSAIDS), you may use medications such as ibuprofen, Aleve or Tylenol for pain control if needed.     If you were fasting, you may resume your normal diet and medications after the procedure    If you have diabetes, check your blood sugar more frequently than usual as your blood sugar may be higher than normal for 10-14 days following a steroid injection. Contact your doctor who manages your diabetes if your blood sugar is higher than usual    Possible side effects of steroids that  you may experience include flushing, elevated blood pressure, increased appetite, mild headaches and restlessness.  All of these symptoms will get better with time.    If you experience any of the following, call the Pain Clinic at [phone]:  -Fever over 100 degree F  -Swelling, bleeding, redness, drainage, warmth at the injection site  -Progressive weakness or numbness in your legs or arms  -Loss of bowel or bladder function  -Unusual headache that is not relieved by Tylenol or other pain reliever  -Unusual new onset of pain that is not improving

## 2022-01-12 ENCOUNTER — TELEPHONE (OUTPATIENT)
Dept: PHARMACY | Facility: CLINIC | Age: 82
End: 2022-01-12
Payer: MEDICARE

## 2022-01-12 DIAGNOSIS — G89.4 CHRONIC PAIN SYNDROME: ICD-10-CM

## 2022-01-12 RX ORDER — DEXTROAMPHETAMINE SACCHARATE, AMPHETAMINE ASPARTATE, DEXTROAMPHETAMINE SULFATE AND AMPHETAMINE SULFATE 3.75; 3.75; 3.75; 3.75 MG/1; MG/1; MG/1; MG/1
TABLET ORAL
Qty: 30 TABLET | Refills: 0 | Status: SHIPPED | OUTPATIENT
Start: 2022-01-12 | End: 2022-02-01

## 2022-01-12 RX ORDER — FENTANYL 12.5 UG/1
1 PATCH TRANSDERMAL
Qty: 5 PATCH | Refills: 0 | Status: SHIPPED | OUTPATIENT
Start: 2022-01-12 | End: 2022-01-24

## 2022-01-12 RX ORDER — HYDROXYZINE PAMOATE 25 MG/1
25-50 CAPSULE ORAL
Qty: 180 CAPSULE | Refills: 3 | Status: SHIPPED | OUTPATIENT
Start: 2022-01-12 | End: 2022-04-20

## 2022-01-12 NOTE — TELEPHONE ENCOUNTER
I spoke with Sandy yesterday and she has a few questions before she sees Dr. Lynn again next month.     First, she would like a dose increase of adderall. She does not feel like it is working as equivalently as her previous methylphenidate dose.     Second, she recived two boxes (#10) of Fentanyl 25 mcg and one box (#5) of Fentanyl 12 mcg. She would like a refill of the 12 mcg in order to continue 37 mcg until she sees Dr. Lynn.     Thank you!     Darlin Elise, Pharm.D., BCACP   Medication Therapy Management Pharmacist   [facility]

## 2022-01-13 ENCOUNTER — TELEPHONE (OUTPATIENT)
Dept: FAMILY MEDICINE | Facility: CLINIC | Age: 82
End: 2022-01-13
Payer: MEDICARE

## 2022-01-13 ENCOUNTER — VIRTUAL VISIT (OUTPATIENT)
Dept: NEUROLOGY | Facility: CLINIC | Age: 82
End: 2022-01-13
Payer: MEDICARE

## 2022-01-13 DIAGNOSIS — F43.23 ADJUSTMENT DISORDER WITH MIXED ANXIETY AND DEPRESSED MOOD: Primary | ICD-10-CM

## 2022-01-13 PROCEDURE — 90834 PSYTX W PT 45 MINUTES: CPT | Mod: 95 | Performed by: PSYCHOLOGIST

## 2022-01-13 NOTE — PROGRESS NOTES
Psychology Progress Note    Date: January 13, 2022    Time length and type of treatment: 52 minutes (10:01 AM - 10:53 AM), individual therapy    After review of the patient's situation, this visit was changed from an in-person visit to a video visit via [employer] to reduce the risk of COVID 19 exposure. Patient was informed that policies and procedures that govern in-person sessions would also apply to  video sessions. Patient was also informed that  area sessions would be discontinued when COVID 19 exposure is no longer a concern (as determined by [facility]).     Patient location: Patient home in [place], [place]  Provider location:  [facility], [place], [place]    Patient was in agreement with proceeding with a  video session.      Necessity: This session is necessary to address the patient's anxiety, depressed mood, and PTSD.  Today we focus on the patient's treatment plan, specifically exploring thoughts and expectations of self and others, and coping strategies.  The reader is invited to review the patient's full treatment plan in the Media section of the patient's Epic medical record.    Psychotherapeutic Technique: This writer utilized motivational interviewing, active listening, reassurance and support in the context of cognitive behavioral therapy to address the above.      MENTAL STATUS EVALUATION  Grooming: Well-groomed  Attire: Appropriate  Age: Appears Stated  Behavior Towards Examiner: Cooperative  Motor Activity: Agitated  Eye Contact: Ranged from avoidant to appropriate  Mood: Agitated  Affect: full range  Speech/Language: pressured  Attention: Easily distracted  Concentration: Sufficient  Thought Process: tangential  Thought Content: Clear    Orientation: Appeared oriented to person, place, and time, though not formally established  Memory: No evidence of impairment.  Judgement: Adequate  Estimated Intelligence: Average  Demonstrated Insight: Fair  Fund of  Knowledge: Adequate    Intervention:   Patient was dysregulated, explaining that she got into another argument with her daughter.  Perspective taking was utilized to help patient depersonalize some aspects of their argument. We discussed the way patient's discomfort with conflict and fear of being attacked impact responding, and role played alternative possible responses.  Patient's miguel is very important to her, and this was utilized to support emotion regulation.       Progress:  Patient calmed as the session progressed and was able to engage in role plays, though struggles to utilize skills when dysregulated.      Plan:   We will meet again in 1 week to address the patient's anxiety, depressed mood, and PTSD.  Estimated duration of treatment is 10+ individual therapy sessions (04788) at twice monthly intervals. Treatment is expected to be completed by September 2022.     Diagnosis:  Adjustment Disorder with mixed anxiety and depressed mood  Posttraumatic Stress Disorder (PTSD)  Attention-Deficit/Hyperactivity Disorder, combined presentation

## 2022-01-13 NOTE — TELEPHONE ENCOUNTER
PA Initiation    Medication: Hydroxyzine Pamoate 25 mg  Insurance Company:  Medicare  Pharmacy Filling the Rx: [facility]  Filling Pharmacy Phone:  [phone]  Filling Pharmacy Fax:  [phone]  Start Date:  01/13/2022

## 2022-01-13 NOTE — LETTER
1/13/2022         RE: Sandy Spencer  [address]  [facility] [address]        Dear Colleague,    Thank you for referring your patient, Sandy Spencer, to the [facility]. Please see a copy of my visit note below.    Psychology Progress Note    Date: January 13, 2022    Time length and type of treatment: 52 minutes (10:01 AM - 10:53 AM), individual therapy    After review of the patient's situation, this visit was changed from an in-person visit to a video visit via [employer] to reduce the risk of COVID 19 exposure. Patient was informed that policies and procedures that govern in-person sessions would also apply to  video sessions. Patient was also informed that  area sessions would be discontinued when COVID 19 exposure is no longer a concern (as determined by [facility]).     Patient location: Patient home in [place], [place]  Provider location:  [facility], [place], [place]    Patient was in agreement with proceeding with a  video session.      Necessity: This session is necessary to address the patient's anxiety, depressed mood, and PTSD.  Today we focus on the patient's treatment plan, specifically exploring thoughts and expectations of self and others, and coping strategies.  The reader is invited to review the patient's full treatment plan in the Media section of the patient's Epic medical record.    Psychotherapeutic Technique: This writer utilized motivational interviewing, active listening, reassurance and support in the context of cognitive behavioral therapy to address the above.      MENTAL STATUS EVALUATION  Grooming: Well-groomed  Attire: Appropriate  Age: Appears Stated  Behavior Towards Examiner: Cooperative  Motor Activity: Agitated  Eye Contact: Ranged from avoidant to appropriate  Mood: Agitated  Affect: full range  Speech/Language: pressured  Attention: Easily distracted  Concentration: Sufficient  Thought Process:  tangential  Thought Content: Clear    Orientation: Appeared oriented to person, place, and time, though not formally established  Memory: No evidence of impairment.  Judgement: Adequate  Estimated Intelligence: Average  Demonstrated Insight: Fair  Fund of Knowledge: Adequate    Intervention:   Patient was dysregulated, explaining that she got into another argument with her daughter.  Perspective taking was utilized to help patient depersonalize some aspects of their argument. We discussed the way patient's discomfort with conflict and fear of being attacked impact responding, and role played alternative possible responses.  Patient's miguel is very important to her, and this was utilized to support emotion regulation.       Progress:  Patient calmed as the session progressed and was able to engage in role plays, though struggles to utilize skills when dysregulated.      Plan:   We will meet again in 1 week to address the patient's anxiety, depressed mood, and PTSD.  Estimated duration of treatment is 10+ individual therapy sessions (39959) at twice monthly intervals. Treatment is expected to be completed by September 2022.     Diagnosis:  Adjustment Disorder with mixed anxiety and depressed mood  Posttraumatic Stress Disorder (PTSD)  Attention-Deficit/Hyperactivity Disorder, combined presentation      Again, thank you for allowing me to participate in the care of your patient.        Sincerely,        Deann Meeks Psy.D, LP

## 2022-01-14 ENCOUNTER — TELEPHONE (OUTPATIENT)
Dept: CARDIOLOGY | Facility: CLINIC | Age: 82
End: 2022-01-14

## 2022-01-14 ENCOUNTER — OFFICE VISIT (OUTPATIENT)
Dept: PHYSICAL MEDICINE AND REHAB | Facility: CLINIC | Age: 82
End: 2022-01-14
Payer: MEDICARE

## 2022-01-14 VITALS — SYSTOLIC BLOOD PRESSURE: 115 MMHG | HEART RATE: 73 BPM | DIASTOLIC BLOOD PRESSURE: 53 MMHG

## 2022-01-14 DIAGNOSIS — M48.061 FORAMINAL STENOSIS OF LUMBAR REGION: Primary | ICD-10-CM

## 2022-01-14 DIAGNOSIS — M79.18 MYOFASCIAL PAIN: ICD-10-CM

## 2022-01-14 DIAGNOSIS — M99.07 SOMATIC DYSFUNCTION OF UPPER EXTREMITY: ICD-10-CM

## 2022-01-14 DIAGNOSIS — M47.812 CERVICAL SPONDYLOSIS WITHOUT MYELOPATHY: ICD-10-CM

## 2022-01-14 DIAGNOSIS — M99.05 SOMATIC DYSFUNCTION OF PELVIS REGION: ICD-10-CM

## 2022-01-14 DIAGNOSIS — M99.00 SOMATIC DYSFUNCTION OF HEAD REGION: ICD-10-CM

## 2022-01-14 DIAGNOSIS — M99.03 SOMATIC DYSFUNCTION OF LUMBAR REGION: ICD-10-CM

## 2022-01-14 DIAGNOSIS — M48.02 CERVICAL STENOSIS OF SPINE: ICD-10-CM

## 2022-01-14 DIAGNOSIS — M99.01 SOMATIC DYSFUNCTION OF CERVICAL REGION: ICD-10-CM

## 2022-01-14 DIAGNOSIS — M99.04 SOMATIC DYSFUNCTION OF SACROILIAC JOINT: ICD-10-CM

## 2022-01-14 DIAGNOSIS — M51.26 LUMBAR DISC HERNIATION: ICD-10-CM

## 2022-01-14 DIAGNOSIS — M99.08 SOMATIC DYSFUNCTION OF RIB REGION: ICD-10-CM

## 2022-01-14 DIAGNOSIS — M99.02 SOMATIC DYSFUNCTION OF THORACIC REGION: ICD-10-CM

## 2022-01-14 DIAGNOSIS — M99.06 SOMATIC DYSFUNCTION OF LOWER EXTREMITY: ICD-10-CM

## 2022-01-14 DIAGNOSIS — M48.061 STENOSIS OF LATERAL RECESS OF LUMBAR SPINE: ICD-10-CM

## 2022-01-14 PROCEDURE — 99214 OFFICE O/P EST MOD 30 MIN: CPT | Mod: 25 | Performed by: PHYSICAL MEDICINE & REHABILITATION

## 2022-01-14 PROCEDURE — 98929 OSTEOPATH MANJ 9-10 REGIONS: CPT | Performed by: PHYSICAL MEDICINE & REHABILITATION

## 2022-01-14 ASSESSMENT — PAIN SCALES - GENERAL: PAINLEVEL: MODERATE PAIN (4)

## 2022-01-14 NOTE — PATIENT INSTRUCTIONS
1. A lumbar epidural injection  has been ordered today. Please schedule this injection at least   2 weeks from now at the pain clinic with Dr Mittal to allow time for insurance prior authorization. On the day of your injection, you cannot be sick or taking antibiotics. If you become sick and are prescribed, please call the clinic so your injection can be rescheduled for once you have completed your antibiotics.       2. Continue the plan to see physical therapy    3. Osteopathic manual medicine today
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
## 2022-01-17 ENCOUNTER — TELEPHONE (OUTPATIENT)
Dept: PALLIATIVE MEDICINE | Facility: OTHER | Age: 82
End: 2022-01-17
Payer: MEDICARE

## 2022-01-17 DIAGNOSIS — B02.8 HERPES ZOSTER WITH COMPLICATION: Primary | ICD-10-CM

## 2022-01-17 NOTE — TELEPHONE ENCOUNTER
[facility] to call [phone].  -[facility]    ----- Message from Polly Marin sent at 1/14/2022  2:37 PM CST -----  Regarding: NICK pt  General phone call:    Caller: Sandy  Primary cardiologist: NICK   Detailed reason for call: She needs NICK to fill out paperwork that she received and a script for the Praulent and she can drop it off at [facility]?  Or do we a standard for that he can fill out in our office?    Best phone number: [phone]  Best time to contact: any  Okay to leave a detailedmessage? yes  Device? no    Additional Info:

## 2022-01-17 NOTE — TELEPHONE ENCOUNTER
PA Initiation 1/17/2022    Medication: amphet/dextr tab 15 mg is non formulary  Insurance Company: Medicare   Pharmacy Filling the Rx:  [facility]  Filling Pharmacy Phone:  [phone]  Filling Pharmacy Fax:  [phone]  Start Date:  1/12/2022

## 2022-01-18 ENCOUNTER — HOSPITAL ENCOUNTER (OUTPATIENT)
Dept: PHYSICAL THERAPY | Facility: REHABILITATION | Age: 82
End: 2022-01-18
Payer: MEDICARE

## 2022-01-18 DIAGNOSIS — G89.4 CHRONIC PAIN SYNDROME: ICD-10-CM

## 2022-01-18 DIAGNOSIS — G90.523 COMPLEX REGIONAL PAIN SYNDROME TYPE 1 OF BOTH LOWER EXTREMITIES: ICD-10-CM

## 2022-01-18 DIAGNOSIS — M79.18 MYOFASCIAL PAIN: ICD-10-CM

## 2022-01-18 DIAGNOSIS — M54.16 LUMBAR RADICULOPATHY: Primary | ICD-10-CM

## 2022-01-18 DIAGNOSIS — B02.8 HERPES ZOSTER WITH COMPLICATION: Primary | ICD-10-CM

## 2022-01-18 PROCEDURE — 97112 NEUROMUSCULAR REEDUCATION: CPT | Mod: GP | Performed by: PHYSICAL THERAPIST

## 2022-01-18 PROCEDURE — 97140 MANUAL THERAPY 1/> REGIONS: CPT | Mod: GP | Performed by: PHYSICAL THERAPIST

## 2022-01-18 NOTE — TELEPHONE ENCOUNTER
Reason for Call:  Other prescription    Detailed comments: pt needs refill valACYclovir (VALTREX) 1000 mg tablet    Phone Number Patient can be reached at: Cell number on file:    Telephone Information:   Mobile [phone]       Best Time: anytime    Can we leave a detailed message on this number? YES    Call taken on 1/18/2022 at 4:58 PM by Violette Pereyra

## 2022-01-18 NOTE — TELEPHONE ENCOUNTER
Central Prior Authorization Team   Phone: [phone]    PA Initiation    Medication:   Insurance Company: [employer] - Phone [phone] Fax [phone]  Pharmacy Filling the Rx: [address] [place] [address]  Filling Pharmacy Phone: [phone]  Filling Pharmacy Fax: [phone]  Start Date: 1/18/2022

## 2022-01-18 NOTE — TELEPHONE ENCOUNTER
Prior Authorization Approval    Authorization Effective Date: 1/13/2022  Authorization Expiration Date: 12/31/2099  Medication: hydroxyzine-APPROVED  Approved Dose/Quantity:    Reference #:     Insurance Company: [employer] - [place] [phone] Fax [phone]  Expected CoPay:       CoPay Card Available:      Foundation Assistance Needed:    Which Pharmacy is filling the prescription (Not needed for infusion/clinic administered): [facility], [address] [place] [address]  Pharmacy Notified: Yes  Patient Notified: Yes  **Instructed pharmacy to notify patient when script is ready to /ship.**

## 2022-01-19 ENCOUNTER — VIRTUAL VISIT (OUTPATIENT)
Dept: NEUROLOGY | Facility: CLINIC | Age: 82
End: 2022-01-19
Payer: MEDICARE

## 2022-01-19 ENCOUNTER — TELEPHONE (OUTPATIENT)
Dept: FAMILY MEDICINE | Facility: CLINIC | Age: 82
End: 2022-01-19
Payer: MEDICARE

## 2022-01-19 DIAGNOSIS — F43.23 ADJUSTMENT DISORDER WITH MIXED ANXIETY AND DEPRESSED MOOD: Primary | ICD-10-CM

## 2022-01-19 PROCEDURE — 90834 PSYTX W PT 45 MINUTES: CPT | Mod: 95 | Performed by: PSYCHOLOGIST

## 2022-01-19 RX ORDER — VALACYCLOVIR HYDROCHLORIDE 1 G/1
1000 TABLET, FILM COATED ORAL DAILY
Qty: 90 TABLET | Refills: 3 | Status: SHIPPED | OUTPATIENT
Start: 2022-01-19 | End: 2022-03-02

## 2022-01-19 RX ORDER — VALACYCLOVIR HYDROCHLORIDE 1 G/1
TABLET, FILM COATED ORAL
Qty: 42 TABLET | Refills: 2 | Status: SHIPPED | OUTPATIENT
Start: 2022-01-19 | End: 2022-02-08

## 2022-01-19 NOTE — TELEPHONE ENCOUNTER
PA Initiation    Medication: Hydroxyzine Pamoate 25mg  Insurance Company:  Medicare  Pharmacy Filling the Rx: [facility]   Filling Pharmacy Phone: [phone]  Filling Pharmacy Fax: [phone]    Start Date:  1/12/2022

## 2022-01-19 NOTE — LETTER
1/19/2022         RE: Sandy Spencer  [address]  [facility] [address]        Dear Colleague,    Thank you for referring your patient, Sandy Spencer, to the [facility]. Please see a copy of my visit note below.    Psychology Progress Note    Date: January 19, 2022    Time length and type of treatment: 46 minutes (1:01 PM - 1:47 PM), individual therapy    After review of the patient's situation, this visit was changed from an in-person visit to a video visit via [employer] to reduce the risk of COVID 19 exposure. Patient was informed that policies and procedures that govern in-person sessions would also apply to video sessions. Patient was also informed that video sessions would be discontinued when COVID 19 exposure is no longer a concern (as determined by [facility]).     Patient location: Patient home in [place], [place]  Provider location:  [facility], [place], [place]    Patient was in agreement with proceeding with a video session.      Necessity: This session is necessary to address the patient's anxiety, depressed mood, and PTSD.  Today we focus on the patient's treatment plan, specifically exploring coping strategies.  The reader is invited to review the patient's full treatment plan in the Media section of the patient's Epic medical record.    Psychotherapeutic Technique: This writer utilized motivational interviewing, active listening, reassurance and support in the context of cognitive behavioral therapy to address the above.      MENTAL STATUS EVALUATION  Grooming: Well-groomed  Attire: Appropriate  Age: Appears Stated  Behavior Towards Examiner: Cooperative  Motor Activity: Within normal limits  Eye Contact: Ranged from appropriate to avoidant  Mood: Sad   Affect: full range  Speech/Language: normal  Attention: Normal  Concentration: Sufficient  Thought Process: unremarkable  Thought Content: Clear    Orientation:  Appeared oriented to person, place, and time, though not formally established  Memory: No evidence of impairment.  Judgement: Adequate  Estimated Intelligence: Average  Demonstrated Insight: Adequate  Fund of Knowledge: Adequate    Intervention:   Patient continues to struggle with the argument she had with her daughter two weeks ago, and her daughter canceled their weekly dinner this week, which further fueled patient distress.  We discussed emotion regulation, distress tolerance, and radical acceptance.  Patient was taught a strategy to help her cope with difficult emotions and was provided psychoeducation related to mindfulness.     Progress:  Patient continues to struggle with emotion regulation and distress over an argument with her daughter.    Plan:   We will meet again in 1 week to address the patient's anxiety, depressed mood, and PTSD.  Estimated duration of treatment is 10+ individual therapy sessions (21727) at weekly intervals. Treatment is expected to be completed by September 2022.     Diagnosis:  Adjustment Disorder with mixed anxiety and depressed mood  Posttraumatic Stress Disorder (PTSD)  Attention-Deficit/Hyperactivity Disorder, combined presentation      Again, thank you for allowing me to participate in the care of your patient.        Sincerely,        Deann Meeks Psy.D, LP

## 2022-01-19 NOTE — PROGRESS NOTES
Psychology Progress Note    Date: January 19, 2022    Time length and type of treatment: 46 minutes (1:01 PM - 1:47 PM), individual therapy    After review of the patient's situation, this visit was changed from an in-person visit to a video visit via [employer] to reduce the risk of COVID 19 exposure. Patient was informed that policies and procedures that govern in-person sessions would also apply to video sessions. Patient was also informed that video sessions would be discontinued when COVID 19 exposure is no longer a concern (as determined by [facility]).     Patient location: Patient home in [place], [place]  Provider location:  [facility], [place], [place]    Patient was in agreement with proceeding with a video session.      Necessity: This session is necessary to address the patient's anxiety, depressed mood, and PTSD.  Today we focus on the patient's treatment plan, specifically exploring coping strategies.  The reader is invited to review the patient's full treatment plan in the Media section of the patient's Epic medical record.    Psychotherapeutic Technique: This writer utilized motivational interviewing, active listening, reassurance and support in the context of cognitive behavioral therapy to address the above.      MENTAL STATUS EVALUATION  Grooming: Well-groomed  Attire: Appropriate  Age: Appears Stated  Behavior Towards Examiner: Cooperative  Motor Activity: Within normal limits  Eye Contact: Ranged from appropriate to avoidant  Mood: Sad   Affect: full range  Speech/Language: normal  Attention: Normal  Concentration: Sufficient  Thought Process: unremarkable  Thought Content: Clear    Orientation: Appeared oriented to person, place, and time, though not formally established  Memory: No evidence of impairment.  Judgement: Adequate  Estimated Intelligence: Average  Demonstrated Insight: Adequate  Fund of Knowledge: Adequate    Intervention:   Patient continues  to struggle with the argument she had with her daughter two weeks ago, and her daughter canceled their weekly dinner this week, which further fueled patient distress.  We discussed emotion regulation, distress tolerance, and radical acceptance.  Patient was taught a strategy to help her cope with difficult emotions and was provided psychoeducation related to mindfulness.     Progress:  Patient continues to struggle with emotion regulation and distress over an argument with her daughter.    Plan:   We will meet again in 1 week to address the patient's anxiety, depressed mood, and PTSD.  Estimated duration of treatment is 10+ individual therapy sessions (29834) at weekly intervals. Treatment is expected to be completed by September 2022.     Diagnosis:  Adjustment Disorder with mixed anxiety and depressed mood  Posttraumatic Stress Disorder (PTSD)  Attention-Deficit/Hyperactivity Disorder, combined presentation

## 2022-01-19 NOTE — TELEPHONE ENCOUNTER
"Outpatient Medication Detail     Disp Refills Start End STEVE   valACYclovir (VALTREX) 1000 MG tablet 42 tablet 2 12/18/2020  No   Sig: TAKE ONE TABLET BY MOUTH THREE TIMES A DAY FOR 7 DAYS as needed for outbreaks   Sent to pharmacy as: valACYclovir 1 gram tablet (VALTREX)   E-Prescribing Status: Receipt confirmed by pharmacy (12/18/2020 11:11 AM CST)       valACYclovir (VALTREX) 1000 MG tablet [[id number]]    Electronically signed by: Caitlyn Rees MD on 12/18/20 1111 Status: Active   Ordering user: Caitlyn Rees MD 12/18/20 1111 Authorized by: Caitlyn Rees MD   Frequency:  12/18/20 - Until Discontinued   Diagnoses  Herpes zoster with complication [B02.8]     Routing refill request to provider for review/approval because:  Labs out of range:  Creatinine     Last office visit provider:  12/15/21     Requested Prescriptions   Pending Prescriptions Disp Refills     valACYclovir (VALTREX) 1000 mg tablet [Pharmacy Med Name: VALACYCLOVIR HCL 1GM TABS] 42 tablet 2     Sig: TAKE ONE TABLET BY MOUTH THREE TIMES A DAY FOR 7 DAYS, AS NEEDED FOR OUTBREAKS       Antivirals for Herpes Protocol Failed - 1/17/2022  2:59 PM        Failed - Normal serum creatinine on file in past 12 months     Recent Labs   Lab Test 11/30/21  1728   CR 1.63*       Ok to refill medication if creatinine is low          Passed - Patient is age 12 or older        Passed - Recent (12 mo) or future (30 days) visit within the authorizing provider's specialty     Patient has had an office visit with the authorizing provider or a provider within the authorizing providers department within the previous 12 mos or has a future within next 30 days. See \"Patient Info\" tab in inbasket, or \"Choose Columns\" in Meds & Orders section of the refill encounter.              Passed - Medication is active on med list             Christophe Goodman RN 01/19/22 2:08 PM  "

## 2022-01-19 NOTE — TELEPHONE ENCOUNTER
Medication: Valtrex 1000mg   Last Date Filled: 9/28/21  Last appointment addressing medication: 12/15/21  Last B/P:  BP Readings from Last 3 Encounters:   01/14/22 115/53   01/11/22 120/55   01/06/22 95/51     Last labs pertaining to refill:       Pend medication and associate diagnosis before routing to Provider for review.       If patient has not been seen in over 1 year, pend 30 day supply and notify patient they are due for an appointment before any further refills.

## 2022-01-24 DIAGNOSIS — R31.9 HEMATURIA SYNDROME: ICD-10-CM

## 2022-01-24 DIAGNOSIS — N18.30 CHRONIC KIDNEY DISEASE, STAGE III (MODERATE) (H): Primary | ICD-10-CM

## 2022-01-24 DIAGNOSIS — I10 ESSENTIAL HYPERTENSION, MALIGNANT: ICD-10-CM

## 2022-01-24 DIAGNOSIS — G89.4 CHRONIC PAIN SYNDROME: ICD-10-CM

## 2022-01-24 RX ORDER — FENTANYL 25 UG/1
1 PATCH TRANSDERMAL
Qty: 5 PATCH | Refills: 0 | Status: SHIPPED | OUTPATIENT
Start: 2022-01-24 | End: 2022-02-08

## 2022-01-24 RX ORDER — FENTANYL 12.5 UG/1
1 PATCH TRANSDERMAL
Qty: 5 PATCH | Refills: 0 | Status: SHIPPED | OUTPATIENT
Start: 2022-01-24 | End: 2022-02-08

## 2022-01-24 NOTE — TELEPHONE ENCOUNTER
Patient calls, reports issues with refill dates of fentanyl 12 mcg and fentanyl 25 mcg.  She reports running out of the 12 mcg patches and then needing to use two 25 mcg patches.  The refills are on different dates and are for different quantities which is making is difficult for the patient to manage.  Will adjust so that both prescriptions reflect a 15 day quantity and can be filled together to help the patient better manage this medication.  Last apt with BE: 12/15/21  Instructions for use of fentanyl:  Plan: We discussed for tapering the fentanyl will prescribe 25 mcg and 12 mcg patches, she will determine whether she wants to use the cost of a micrograms versus alternating 25 and 50 mcg patches every 3 days till reaches a more steady state.    Will cue with directions to continue tapering as instructed however will send together and for the same quantities to help patient better keep track    Pending Prescriptions:                       Disp   Refills    fentaNYL (DURAGESIC) 25 mcg/hr 72 hr patch5 patch0            Sig: Place 1 patch onto the skin every 72 hours remove           old patch.    fentaNYL (DURAGESIC) 12 mcg/hr 72 hr patch5 patch0            Sig: Place 1 patch onto the skin every 72 hours remove           old patch.    Alvaro

## 2022-01-24 NOTE — TELEPHONE ENCOUNTER
Central Prior Authorization Team   Phone: [phone]      PA NOT NEEDED    Medication: hydroxyzine  Insurance Company: [employer] - Phone [phone] Fax [phone]  [facility]   [place] - [address] [place] [address]  Filling Pharmacy Phone: [phone]  Filling Pharmacy Fax:    Start Date: 1/24/2022    Started PA on CMM and a response of Prior Authorization Not Required.  Called pharmacy and they state the patient already picked up medication.

## 2022-01-25 DIAGNOSIS — R31.9 HEMATURIA, UNSPECIFIED TYPE: ICD-10-CM

## 2022-01-25 DIAGNOSIS — N18.30 CHRONIC KIDNEY DISEASE, STAGE III (MODERATE) (H): Primary | ICD-10-CM

## 2022-01-25 DIAGNOSIS — I10 ESSENTIAL HYPERTENSION, MALIGNANT: ICD-10-CM

## 2022-01-27 ENCOUNTER — VIRTUAL VISIT (OUTPATIENT)
Dept: NEUROLOGY | Facility: CLINIC | Age: 82
End: 2022-01-27
Payer: MEDICARE

## 2022-01-27 DIAGNOSIS — F43.23 ADJUSTMENT DISORDER WITH MIXED ANXIETY AND DEPRESSED MOOD: Primary | ICD-10-CM

## 2022-01-27 PROCEDURE — 90834 PSYTX W PT 45 MINUTES: CPT | Mod: 95 | Performed by: PSYCHOLOGIST

## 2022-01-27 NOTE — LETTER
1/27/2022         RE: Sandy Spencer  [address]  [facility] [address]        Dear Colleague,    Thank you for referring your patient, Sandy Spencer, to the [facility]. Please see a copy of my visit note below.    Psychology Progress Note    Date: January 27, 2022    Time length and type of treatment: 46 minutes (10:00 AM to 10:46 AM), individual therapy    After review of the patient's situation, this visit was changed from an in-person visit to a  video visit via Kwanji to reduce the risk of COVID 19 exposure. Patient was informed that policies and procedures that govern in-person sessions would also apply to  video sessions. Patient was also informed that  video sessions would be discontinued when COVID 19 exposure is no longer a concern (as determined by [facility]).     Patient location: Patient home in [place], [place]  Provider location:  [facility], [place], [place]    Patient was in agreement with proceeding with a  video session.      Necessity: This session is necessary to address the patient's anxiety, depressed mood, and PTSD.  Today we focus on the patient's treatment plan, specifically exploring thoughts and expectations of self and others, and bibliotherapy. The reader is invited to review the patient's full treatment plan in the Media section of the patient's Epic medical record.    Psychotherapeutic Technique: This writer utilized motivational interviewing, active listening, reassurance and support in the context of cognitive behavioral therapy to address the above.      MENTAL STATUS EVALUATION  Grooming: Within normal limits  Attire: Appropriate  Age: Appears Stated  Behavior Towards Examiner: Cooperative  Motor Activity: Within normal limits  Eye Contact: Appropriate  Mood: Depressed   Affect: blunted  Speech/Language: normal  Attention: Fair  Concentration: Sufficient  Thought Process: tangential  Thought  "Content: Clear    Orientation: Appeared oriented to person, place, and time, though not formally established  Memory: No evidence of impairment.  Judgement: Fair  Estimated Intelligence: Average  Demonstrated Insight: Fair  Fund of Knowledge: Adequate    Intervention:   Patient continues to ruminate on the disagreement with her daughter, her daughter's failure to visit for two weeks, and her anger at her daughter's \"refusal to forgive.\"  Several efforts were made to reframe this dynamic, but with limited success.  Patient articulated the belief that it is not possible for her daughter to love her if she is angry with her.  Efforts to explore the truthfulness or usefulness of this belief were also met with limited success.  A book on boundaries was recommended, but patient expressed some concern because the book wasn't written by a [employer].     Progress:  Patient continues to ruminate on a disagreement with her daughter.     Plan:   We will meet again in 1 week to address the patient's anxiety, depressed mood, and PTSD.  Estimated duration of treatment is 10+ individual therapy sessions (96101) at weekly intervals. Treatment is expected to be completed by September 2022.     Diagnosis:    Adjustment Disorder with mixed anxiety and depressed mood  Posttraumatic Stress Disorder (PTSD)  Attention-Deficit/Hyperactivity Disorder, combined presentation      Again, thank you for allowing me to participate in the care of your patient.        Sincerely,        Deann Meeks Psy.D, LP    "

## 2022-01-27 NOTE — PROGRESS NOTES
Psychology Progress Note    Date: January 27, 2022    Time length and type of treatment: 46 minutes (10:00 AM to 10:46 AM), individual therapy    After review of the patient's situation, this visit was changed from an in-person visit to a  video visit via Active [employer] to reduce the risk of COVID 19 exposure. Patient was informed that policies and procedures that govern in-person sessions would also apply to  video sessions. Patient was also informed that  video sessions would be discontinued when COVID 19 exposure is no longer a concern (as determined by [facility]).     Patient location: Patient home in [place], [place]  Provider location:  [facility], [place], [place]    Patient was in agreement with proceeding with a  video session.      Necessity: This session is necessary to address the patient's anxiety, depressed mood, and PTSD.  Today we focus on the patient's treatment plan, specifically exploring thoughts and expectations of self and others, and bibliotherapy. The reader is invited to review the patient's full treatment plan in the Media section of the patient's Epic medical record.    Psychotherapeutic Technique: This writer utilized motivational interviewing, active listening, reassurance and support in the context of cognitive behavioral therapy to address the above.      MENTAL STATUS EVALUATION  Grooming: Within normal limits  Attire: Appropriate  Age: Appears Stated  Behavior Towards Examiner: Cooperative  Motor Activity: Within normal limits  Eye Contact: Appropriate  Mood: Depressed   Affect: blunted  Speech/Language: normal  Attention: Fair  Concentration: Sufficient  Thought Process: tangential  Thought Content: Clear    Orientation: Appeared oriented to person, place, and time, though not formally established  Memory: No evidence of impairment.  Judgement: Fair  Estimated Intelligence: Average  Demonstrated Insight: Fair  Fund of Knowledge:  "Adequate    Intervention:   Patient continues to ruminate on the disagreement with her daughter, her daughter's failure to visit for two weeks, and her anger at her daughter's \"refusal to forgive.\"  Several efforts were made to reframe this dynamic, but with limited success.  Patient articulated the belief that it is not possible for her daughter to love her if she is angry with her.  Efforts to explore the truthfulness or usefulness of this belief were also met with limited success.  A book on boundaries was recommended, but patient expressed some concern because the book wasn't written by a [employer].     Progress:  Patient continues to ruminate on a disagreement with her daughter.     Plan:   We will meet again in 1 week to address the patient's anxiety, depressed mood, and PTSD.  Estimated duration of treatment is 10+ individual therapy sessions (24171) at weekly intervals. Treatment is expected to be completed by September 2022.     Diagnosis:    Adjustment Disorder with mixed anxiety and depressed mood  Posttraumatic Stress Disorder (PTSD)  Attention-Deficit/Hyperactivity Disorder, combined presentation  "

## 2022-01-28 ENCOUNTER — NURSE TRIAGE (OUTPATIENT)
Dept: NURSING | Facility: CLINIC | Age: 82
End: 2022-01-28
Payer: MEDICARE

## 2022-01-28 NOTE — TELEPHONE ENCOUNTER
Patient calling with blood pressure medication question:    In past week BP has dropped 20 mm Hg   90/63  88/63  P 70    118/68  At 6:17 PM  120/70 at 7:50 PM    Not liking the way she's feeling: lightheaded, no energy  Patient believes that she can manage this at home by reducing her BP for a day or two and does not want to go to ED.    States she saw kidney  On 1/24/22 and because her BP has been trending downward,  Reduced her carvedilol     6.25 mg carvedilol twice daily - reduced from 12.5 on 1/24/22  10 mg lisinopril once daily at bedtime   Torsemide 20 mg once daily    Patient Denies:  Chest pain/pressure  Fever    According to the protocol, patient should go to ED now or PCP triage.  Care advice given. Patient verbalizes understanding.  She will wait for response from PCP or oncall provider as to holding medication tonight.    PCP is available to page.  Dr. Rees returned call with following recommendation:  Can hold both medications tonight and if under 120/80 tomorrow,can hold again tomorrow.  Patient can call the clinic on Monday and we'll see if we can fit her in somewhere.    Patient is notified of Provider response.  She verbalizes understanding and agrees with plan of care.    Message routed to PCP for labs patient needs to have done per nephrology provider.    Lindy Liriano RN, Nurse Advisor 10:25 PM 1/28/2022  COVID 19 Nurse Triage Plan/Patient Instructions    Please be aware that novel coronavirus (COVID-19) may be circulating in the community. If you develop symptoms such as fever, cough, or SOB or if you have concerns about the presence of another infection including coronavirus (COVID-19), please contact your health care provider or visit [email or web address].     Disposition/Instructions    Home care recommended. Follow home care protocol based instructions.    Thank you for taking steps to prevent the spread of this virus.  o Limit your contact with others.  o Wear a simple mask  "to cover your cough.  o Wash your hands well and often.    Resources    [facility]: About COVID-19: [email or web address]    CDC: What to Do If You're Sick: www.cdc.gov/coronavirus/2019-ncov/about/steps-when-sick.html    CDC: Ending Home Isolation: www.cdc.gov/coronavirus/2019-ncov/hcp/disposition-in-home-patients.html     CDC: Caring for Someone: www.cdc.gov/coronavirus/2019-ncov/if-you-are-sick/care-for-someone.html     [facility]: Interim Guidance for Hospital Discharge to Home: www.health.[facility].[place]/diseases/coronavirus/hcp/hospdischarge.pdf    [facility] clinical trials (COVID-19 research studies): clinicalaffairs.[employer].[email or web address]/[employer]-clinical-trials     Below are the COVID-19 hotlines at the [place] Department of Health ([facility]). Interpreters are available.   o For health questions: Call 210-468-0971 or 1-288.968.9944 (7 a.m. to 7 p.m.)  o For questions about schools and childcare: Call 744-551-8084 or 1-631.439.9756 (7 a.m. to 7 p.m.)     Reason for Disposition    [1] Fall in systolic BP > 20 mm Hg from normal AND [2] dizzy, lightheaded, or weak    Additional Information    Negative: Started suddenly after an allergic medicine, an allergic food, or bee sting    Negative: Shock suspected (e.g., cold/pale/clammy skin, too weak to stand, low BP, rapid pulse)    Negative: Difficult to awaken or acting confused (e.g., disoriented, slurred speech)    Negative: Fainted    Negative: [1] Systolic BP < 90 AND [2] dizzy, lightheaded, or weak    Negative: Chest pain    Negative: Bleeding (e.g., vomiting blood, rectal bleeding or tarry stools, severe vaginal bleeding)(Exception: fainted from sight of small amount of blood; small cut or abrasion)    Negative: Extra heart beats or heart is beating fast  (i.e., \"palpitations\")    Negative: Sounds like a life-threatening emergency to the triager    Negative: [1] Systolic BP < 80 AND [2] NOT dizzy, lightheaded or weak    Negative: Abdominal pain    Negative: " Fever > 100.4 F (38.0 C)    Negative: Major surgery in the past month    Negative: [1] Drinking very little AND [2] dehydration suspected (e.g., no urine > 12 hours, very dry mouth, very lightheaded)    Protocols used: LOW BLOOD PRESSURE-A-AH

## 2022-01-31 ENCOUNTER — OFFICE VISIT (OUTPATIENT)
Dept: FAMILY MEDICINE | Facility: CLINIC | Age: 82
End: 2022-01-31
Payer: MEDICARE

## 2022-01-31 VITALS
TEMPERATURE: 99.2 F | HEART RATE: 94 BPM | WEIGHT: 156 LBS | DIASTOLIC BLOOD PRESSURE: 52 MMHG | HEIGHT: 64 IN | OXYGEN SATURATION: 96 % | SYSTOLIC BLOOD PRESSURE: 88 MMHG | BODY MASS INDEX: 26.63 KG/M2 | RESPIRATION RATE: 18 BRPM

## 2022-01-31 DIAGNOSIS — I95.89 OTHER SPECIFIED HYPOTENSION: ICD-10-CM

## 2022-01-31 DIAGNOSIS — N18.31 STAGE 3A CHRONIC KIDNEY DISEASE (H): ICD-10-CM

## 2022-01-31 DIAGNOSIS — I10 HYPERTENSION, UNSPECIFIED TYPE: Primary | ICD-10-CM

## 2022-01-31 LAB
ALBUMIN SERPL-MCNC: 4.1 G/DL (ref 3.5–5)
ALP SERPL-CCNC: 49 U/L (ref 45–120)
ALT SERPL W P-5'-P-CCNC: 12 U/L (ref 0–45)
ANION GAP SERPL CALCULATED.3IONS-SCNC: 15 MMOL/L (ref 5–18)
AST SERPL W P-5'-P-CCNC: 20 U/L (ref 0–40)
BASOPHILS # BLD AUTO: 0 10E3/UL (ref 0–0.2)
BASOPHILS NFR BLD AUTO: 1 %
BILIRUB SERPL-MCNC: 0.8 MG/DL (ref 0–1)
BUN SERPL-MCNC: 44 MG/DL (ref 8–28)
CALCIUM SERPL-MCNC: 9.2 MG/DL (ref 8.5–10.5)
CHLORIDE BLD-SCNC: 99 MMOL/L (ref 98–107)
CO2 SERPL-SCNC: 23 MMOL/L (ref 22–31)
CREAT SERPL-MCNC: 1.99 MG/DL (ref 0.6–1.1)
CREAT UR-MCNC: 138 MG/DL
EOSINOPHIL # BLD AUTO: 0.1 10E3/UL (ref 0–0.7)
EOSINOPHIL NFR BLD AUTO: 2 %
ERYTHROCYTE [DISTWIDTH] IN BLOOD BY AUTOMATED COUNT: 12.9 % (ref 10–15)
GFR SERPL CREATININE-BSD FRML MDRD: 25 ML/MIN/1.73M2
GLUCOSE BLD-MCNC: 148 MG/DL (ref 70–125)
HCT VFR BLD AUTO: 35.6 % (ref 35–47)
HGB BLD-MCNC: 12.1 G/DL (ref 11.7–15.7)
IMM GRANULOCYTES # BLD: 0 10E3/UL
IMM GRANULOCYTES NFR BLD: 0 %
LYMPHOCYTES # BLD AUTO: 1.8 10E3/UL (ref 0.8–5.3)
LYMPHOCYTES NFR BLD AUTO: 29 %
MCH RBC QN AUTO: 34.8 PG (ref 26.5–33)
MCHC RBC AUTO-ENTMCNC: 34 G/DL (ref 31.5–36.5)
MCV RBC AUTO: 102 FL (ref 78–100)
MICROALBUMIN UR-MCNC: 2.41 MG/DL (ref 0–1.99)
MICROALBUMIN/CREAT UR: 17.5 MG/G CR
MONOCYTES # BLD AUTO: 0.6 10E3/UL (ref 0–1.3)
MONOCYTES NFR BLD AUTO: 10 %
NEUTROPHILS # BLD AUTO: 3.6 10E3/UL (ref 1.6–8.3)
NEUTROPHILS NFR BLD AUTO: 58 %
PHOSPHATE SERPL-MCNC: 4.5 MG/DL (ref 2.5–4.5)
PLATELET # BLD AUTO: 143 10E3/UL (ref 150–450)
POTASSIUM BLD-SCNC: 3.9 MMOL/L (ref 3.5–5)
PROT SERPL-MCNC: 6.7 G/DL (ref 6–8)
PTH-INTACT SERPL-MCNC: 183 PG/ML (ref 10–86)
RBC # BLD AUTO: 3.48 10E6/UL (ref 3.8–5.2)
RHEUMATOID FACT SER NEPH-ACNC: <15 IU/ML (ref 0–30)
SODIUM SERPL-SCNC: 137 MMOL/L (ref 136–145)
TSH SERPL DL<=0.005 MIU/L-ACNC: 0.57 UIU/ML (ref 0.3–5)
WBC # BLD AUTO: 6.1 10E3/UL (ref 4–11)

## 2022-01-31 PROCEDURE — 84100 ASSAY OF PHOSPHORUS: CPT | Performed by: FAMILY MEDICINE

## 2022-01-31 PROCEDURE — 86431 RHEUMATOID FACTOR QUANT: CPT | Performed by: FAMILY MEDICINE

## 2022-01-31 PROCEDURE — 36415 COLL VENOUS BLD VENIPUNCTURE: CPT | Performed by: FAMILY MEDICINE

## 2022-01-31 PROCEDURE — 82043 UR ALBUMIN QUANTITATIVE: CPT | Performed by: FAMILY MEDICINE

## 2022-01-31 PROCEDURE — 86200 CCP ANTIBODY: CPT | Performed by: FAMILY MEDICINE

## 2022-01-31 PROCEDURE — 84443 ASSAY THYROID STIM HORMONE: CPT | Performed by: FAMILY MEDICINE

## 2022-01-31 PROCEDURE — 83970 ASSAY OF PARATHORMONE: CPT | Performed by: FAMILY MEDICINE

## 2022-01-31 PROCEDURE — 80053 COMPREHEN METABOLIC PANEL: CPT | Performed by: FAMILY MEDICINE

## 2022-01-31 PROCEDURE — 82306 VITAMIN D 25 HYDROXY: CPT | Performed by: FAMILY MEDICINE

## 2022-01-31 PROCEDURE — 85025 COMPLETE CBC W/AUTO DIFF WBC: CPT | Performed by: FAMILY MEDICINE

## 2022-01-31 PROCEDURE — 99214 OFFICE O/P EST MOD 30 MIN: CPT | Performed by: FAMILY MEDICINE

## 2022-01-31 RX ORDER — LISINOPRIL 20 MG/1
20 TABLET ORAL DAILY
Qty: 30 TABLET | Refills: 3 | OUTPATIENT
Start: 2022-01-31 | End: 2022-02-05

## 2022-01-31 RX ORDER — BUTALBITAL, ACETAMINOPHEN AND CAFFEINE 300; 40; 50 MG/1; MG/1; MG/1
CAPSULE ORAL
COMMUNITY
Start: 2021-05-20 | End: 2022-02-08

## 2022-01-31 RX ORDER — CARVEDILOL 3.12 MG/1
3.12 TABLET ORAL 2 TIMES DAILY WITH MEALS
Qty: 60 TABLET | Refills: 1 | Status: SHIPPED | OUTPATIENT
Start: 2022-01-31 | End: 2022-02-09

## 2022-01-31 RX ORDER — TORSEMIDE 20 MG/1
20 TABLET ORAL
COMMUNITY
Start: 2022-01-24 | End: 2022-01-31

## 2022-01-31 ASSESSMENT — MIFFLIN-ST. JEOR: SCORE: 1159.67

## 2022-01-31 NOTE — LETTER
February 14, 2022      Sandymelania Stoutton  [address]  [facility] [address]        Dear ,    We are writing to inform you of your test results.    Your urine testing showed no significant damage to the kidneys from your history of high blood pressure.     Resulted Orders   Albumin Random Urine Quantitative with Creat Ratio   Result Value Ref Range    Microalbumin Urine mg/dL 2.41 (H) 0.00 - 1.99 mg/dL    Creatinine Urine mg/dL 138 mg/dL    Microalbumin Urine mg/g Cr 17.5 <=19.9 mg/g Cr    Narrative    Microalbumin, Random Urine   <2.0 mg/dL . . . . . . . . Normal   3.0-30.0 mg/dL . . . . . . Microalbuminuria   >30.0 mg/dL . . . . . .  . Clinical Proteinuria     Microalbumin/Creatinine Ratio, Random Urine   <20 mg/g . . . . .. . . . Normal    mg/g . . . . . . . Microalbuminuria   >300 mg/g . . . . . . . . Clinical Proteinuria       If you have any questions or concerns, please call the clinic at the number listed above.       Sincerely,      Caitlyn Rees MD

## 2022-01-31 NOTE — PROGRESS NOTES
"  Assessment & Plan     (I10) Hypertension, unspecified type  (primary encounter diagnosis)  Comment: she takes medication as instructed and torsemide has side effect of causing leg cramping. She does monitor bp at home. Last week bp has been low.   Plan: lisinopril (ZESTRIL) 20 MG tablet, carvedilol         (COREG) 3.125 MG tablet, Comprehensive         metabolic panel (BMP + Alb, Alk Phos, ALT, AST,        Total. Bili, TP), TSH with free T4 reflex        Advise to stop torsemide due to side effect and resume lasix.     (I95.89) Other specified hypotension  Comment: bp has been low for a  week. She feels tired. No cp, sob, palpitation, headache, vision change. She does drink adequate fluid. I offered one can of juice at office and after drinking recheck bp went little up, but still very low.  Not sure why her bp dropped in past week.   Plan:  I decrease the dose of lisinopril and coreg       Strongly advise to monitor blood pressure at home twice a day at morning and evening.   If   blood pressure less than 100/60 she needs to hold  blood pressure medication and call clinic. Advise to follow up with Dr. Rees in one week.   If   blood pressure higher than 130/90 please have a follow up visit.   If she has chest pain, shortness of breath and headache she needs to go to urgency room or ER for evaluation and treatment.       She asked for rheumatoid arthritis lab Cyclic Citrullinated Peptide Antibody IgG,         Rheumatoid factor           (N18.31) Stage 3a chronic kidney disease (H)  Comment: stable condition   Plan: will monitor.    [id number]}     BMI:   Estimated body mass index is 27.2 kg/m  as calculated from the following:    Height as of this encounter: 1.613 m (5' 3.5\").    Weight as of this encounter: 70.8 kg (156 lb).       Return in about 1 week (around 2/7/2022).    Valeria Salgado MD  [facility]    Alicia Delgado is a 81 year old who presents for the following health issues " "    HPI     Pt has low bp for one week. She was at nephrologist office one week ago bp low so coreg changed from 12.5 bid to 6.25 bid. She also started torsemide 20 mg and stopped lisix 40 mg and she was told torsemide was stronger. But she has side effect to torsemide that cause her legs cramping. She dose not want to take torsemide.     She dose monitor bp at home and her machine reading at office was 100/62.  She state she feels tired. Denies cp, sob, headache and vision change.     She takes Eliquis ( she gets it from [place])  for anticoagulation         Review of Systems   Constitutional, HEENT, cardiovascular, pulmonary, gi and gu systems are negative, except as otherwise noted.      Objective    BP (!) 88/52 (BP Location: Left arm, Patient Position: Sitting, Cuff Size: Adult Regular)   Pulse 94   Temp 99.2  F (37.3  C) (Oral)   Resp 18   Ht 1.613 m (5' 3.5\")   Wt 70.8 kg (156 lb)   LMP  (LMP Unknown)   SpO2 96%   Breastfeeding No   BMI 27.20 kg/m    Body mass index is 27.2 kg/m .  Physical Exam   GENERAL: healthy, alert and no distress  NECK: no adenopathy, no asymmetry, masses, or scars and thyroid normal to palpation  RESP: lungs clear to auscultation - no rales, rhonchi or wheezes  CV: regular rate and rhythm, normal S1 S2, no S3 or S4, no murmur, click or rub, no peripheral edema and peripheral pulses strong  ABDOMEN: soft, nontender, no hepatosplenomegaly, no masses and bowel sounds normal  MS: no gross musculoskeletal defects noted, no edema           "

## 2022-01-31 NOTE — PATIENT INSTRUCTIONS
Your blood pressure is very low today at office. I decrease the dose of your medication.   Strongly advise to monitor your blood pressure at home twice a day at morning and evening.   If your blood pressure less than 100/60 please hold your blood pressure medication and call clinic. Advise to follow up with Dr. Rees in one week.   If your blood pressure higher than 130/90 please have a follow up visit.   If you have chest pain, shortness of breath and headache please go to urgency room or ER for evaluation and treatment.

## 2022-02-01 ENCOUNTER — HOSPITAL ENCOUNTER (OUTPATIENT)
Dept: PHYSICAL THERAPY | Facility: REHABILITATION | Age: 82
End: 2022-02-01
Payer: MEDICARE

## 2022-02-01 DIAGNOSIS — G89.4 CHRONIC PAIN SYNDROME: ICD-10-CM

## 2022-02-01 DIAGNOSIS — M79.18 MYOFASCIAL PAIN: ICD-10-CM

## 2022-02-01 DIAGNOSIS — M54.16 LUMBAR RADICULOPATHY: Primary | ICD-10-CM

## 2022-02-01 DIAGNOSIS — G90.523 COMPLEX REGIONAL PAIN SYNDROME TYPE 1 OF BOTH LOWER EXTREMITIES: ICD-10-CM

## 2022-02-01 LAB
CCP AB SER IA-ACNC: <0.5 U/ML
DEPRECATED CALCIDIOL+CALCIFEROL SERPL-MC: 39 UG/L (ref 30–80)

## 2022-02-01 PROCEDURE — 97112 NEUROMUSCULAR REEDUCATION: CPT | Mod: GP | Performed by: PHYSICAL THERAPIST

## 2022-02-01 PROCEDURE — 97140 MANUAL THERAPY 1/> REGIONS: CPT | Mod: GP | Performed by: PHYSICAL THERAPIST

## 2022-02-01 RX ORDER — DEXTROAMPHETAMINE SACCHARATE, AMPHETAMINE ASPARTATE, DEXTROAMPHETAMINE SULFATE AND AMPHETAMINE SULFATE 3.75; 3.75; 3.75; 3.75 MG/1; MG/1; MG/1; MG/1
TABLET ORAL
Qty: 30 TABLET | Refills: 0 | Status: SHIPPED | OUTPATIENT
Start: 2022-02-01 | End: 2022-02-09

## 2022-02-01 NOTE — TELEPHONE ENCOUNTER
Received call from patient requesting refill(s) of amphetamine-dextroamphetamine (ADDERALL) 15 MG tablet     Last dispensed from pharmacy on 1/14/22    Patient's last office/virtual visit by prescribing provider on 12/15/21  Next office/virtual appointment scheduled for 2/9/22    Last urine drug screen date 4/23/21  Current opioid agreement on file (completed within the last year) Yes Date of opioid agreement: 4/20/21    E-prescribe to   [address] [address]  Phone: [phone] Fax: [phone]    Pt requests a 30 day supply.    Will route to nursing pool for review and preparation of prescription(s).

## 2022-02-01 NOTE — PROGRESS NOTES
Medication Therapy Management (MTM) Encounter    ASSESSMENT:                            Hypertension: BP has been low lately, but now that she is on less carveilol and lisinopril I would expect her BP to rise. BP was good at home today. Will recheck along with BMP at her upcoming appointment with PCP.     Edema: Patient is currently not taking a diuretic. Since the torsemide 20 mg gave her cramps, I recommended that she try half tablet of torsemide along with her magnesium supplement. Will check a mag level with upcoming BMP.     Vitiligo: Reports newly diagnosed. Stable at this time.     Chronic pain: Continue to follow with the pain clinic. Encouraged her to ask him about a month supply since the price is the same for a smaller supply. She will also call Amy at [facility] Patient Assistance program.     Depression/anxiety: She is now on Adderall and has not seen a significant difference.  She will talk next week to Dr. Lynn regarding a dose increase.     Hypothyroidism:  Last TSH WNL.       PLAN:                            1.  Phone number to [facility] patient assistance program given today.  2.  Restart torsemide 20 mg but half tablet (10 mg) daily AM with magnesium.   3.  Future BMP and Mag level     Follow-up: 4-week phone follow-up     SUBJECTIVE/OBJECTIVE:                          Sandy Spencer is a 81 year old female called for a follow up visit. She was referred from Dr. Rees for polypharmacy.     Reason for visit: Follow up since we last spoke 1/11/22  Medication Adherence/Access:  She used to have home care through [facility] but was discharged.  More recently her friend (an RN) has been setting up her medicines for her. Certain oral medications goes right through her -- has 9 inches of her colon. Would like that considered for her medications. She is using [facility] for some of her medicines.  Prefers capsules.  She is working on prescription assistance programs and  getting extra help through Medicare.    Hypertension: Current regimen: carvedilol 3.125 mg TWICE DAILY and lisinopril 20 mg daily. At nephrology patient was hypotensive and they decreased carvedilol from 12.5 mg BID on 1/24/22. Continued to have hypotension and on 1/31/22 carvedilol decreased further to 3.125 mg and lisinopril decreased from 40 mg. Reports home BP was 103/65 mmHg and pulse 75 bpm, last checked 15 minutes ago. Was not feeling lightheaded or dizzy, but fatigue and no energy which has not improved.   BP Readings from Last 3 Encounters:   01/31/22 (!) 88/52   01/14/22 115/53   01/11/22 120/55       Edema: At nephrology appointment patient was reporting feelings of swelling, tightness in legs. Weight up 15 lb since visit in May 2021. They replaced Lasix with torsemide 20 mg on 1/24/22, due to possible greater benefit and longer acting than Lasix. Met with Dr. Salgado on 1/31/22 and changed back to furosemide due to cramping at night from torsemide. Reports her legs still hurt from the cramps, took a break from both and is taking neither. Reports that she does not have pitting edema but it is uncomfortable since her pants are tight.  BP checked on 1/31/22 showed worsening SCr. She did start magnesium 250 mg for the cramps.     Vitiligo: Reports newly diagnosed. Met with [facility] derm and had a biopsy. Was recommended to take a hydrocortisone cream and apply twice daily for 2 weeks then break for 2 weeks. She did not think it was a big deal and is not planning on using the hydrocortisone.      Chronic pain: Follows with the pain clinic. Continues fentanyl 25 mcg + 12 mcg every 72 hours (decreased from 50 mcg patch every 48 hours).  She would like to be off the fentanyl patch eventually and would like to continue to taper off.  She is frustrated about getting one box at a time due to the price. Continues ketamine 40 mg lozenge half (20 mg) four times daily.  Using for extreme pain. She thinks it has been helpful  so far, but she does not like the taste since it is a very strong peppermint flavor.  She also is unable to cut it in half, therefore she sucks on and saves the other half.   Patient has Narcan at home  1/11 she underwent a steroid injection.  1/14 underwent full body osteopathic manipulation     Depression/anxiety: On 12/15/21 she was changed to adderall 10 mg twice daily (AM and noon) due to feeling like it worked better than methylphenidate  in the past.  Dose increased to 15 mg twice daily on 1/12/22. She has not noticed a huge difference so far, and would like a dose increase 20 mg.  She has not noticed a big difference from the methylphenidate.      Hypothyroidism: Currently taking levothyroxine 50 mcg.Last TSH checked 1/31/22.        Today's Vitals: LMP  (LMP Unknown)   ----------------    Allergies/ADRs: Reviewed in chart  Past Medical History: Reviewed in chart  Tobacco: She reports that she quit smoking about 21 years ago. She has a 20.00 pack-year smoking history. She has never used smokeless tobacco.  Alcohol: Reviewed in chart    I spent 34 minutes with this patient today. All changes were made via collaborative practice agreement with Caitlyn Rees MD. A copy of the visit note was provided to the patient's primary care provider.    The patient declined a summary of these recommendations.     Darlin Elise, Pharm.D., [facility]   Medication Therapy Management Pharmacist   [facility]     Telemedicine Visit Details  Type of service:  Telephone visit  Start Time: 11:35 PM  End Time: 12:09 PM  Originating Location (patient location): Home  Distant Location (provider location):  [facility]     Medication Therapy Recommendations  Edema    Current Medication: torsemide (DEMADEX) 20 MG tablet   Rationale: Untreated condition - Needs additional medication therapy - Indication   Recommendation: Start Medication   Status: Accepted per CPA

## 2022-02-01 NOTE — TELEPHONE ENCOUNTER
Pending Prescriptions:                       Disp   Refills    amphetamine-dextroamphetamine (ADDERALL) *30 tab*0            Sig: One tab morning and noon    [facility]

## 2022-02-02 ENCOUNTER — VIRTUAL VISIT (OUTPATIENT)
Dept: PHARMACY | Facility: CLINIC | Age: 82
End: 2022-02-02
Payer: COMMERCIAL

## 2022-02-02 ENCOUNTER — TELEPHONE (OUTPATIENT)
Dept: FAMILY MEDICINE | Facility: CLINIC | Age: 82
End: 2022-02-02
Payer: MEDICARE

## 2022-02-02 DIAGNOSIS — L80 VITILIGO: ICD-10-CM

## 2022-02-02 DIAGNOSIS — G89.4 CHRONIC PAIN SYNDROME: ICD-10-CM

## 2022-02-02 DIAGNOSIS — E03.9 HYPOTHYROIDISM, UNSPECIFIED TYPE: ICD-10-CM

## 2022-02-02 DIAGNOSIS — N18.30 CHRONIC KIDNEY DISEASE, STAGE III (MODERATE) (H): Primary | ICD-10-CM

## 2022-02-02 DIAGNOSIS — F06.4 ANXIETY DISORDER DUE TO MEDICAL CONDITION: ICD-10-CM

## 2022-02-02 DIAGNOSIS — R60.1 GENERALIZED EDEMA: ICD-10-CM

## 2022-02-02 DIAGNOSIS — I15.9 SECONDARY HYPERTENSION: Primary | ICD-10-CM

## 2022-02-02 PROCEDURE — 99607 MTMS BY PHARM ADDL 15 MIN: CPT | Performed by: PHARMACIST

## 2022-02-02 PROCEDURE — 99606 MTMS BY PHARM EST 15 MIN: CPT | Performed by: PHARMACIST

## 2022-02-02 RX ORDER — TORSEMIDE 20 MG/1
10 TABLET ORAL DAILY
COMMUNITY
End: 2022-02-18

## 2022-02-02 NOTE — TELEPHONE ENCOUNTER
Please inform pt that her kidney function is worse than 2 month ago and likely due to her recently low blood pressure. Strongly advise her to have follow-up visit next week with either me or Dr. Rees.     Valeria Salgado MD on 2/2/2022 at 9:11 AM

## 2022-02-03 ENCOUNTER — TELEPHONE (OUTPATIENT)
Dept: PALLIATIVE MEDICINE | Facility: OTHER | Age: 82
End: 2022-02-03
Payer: MEDICARE

## 2022-02-03 NOTE — TELEPHONE ENCOUNTER
PA Initiation 2/3/2022    Medication: amphet/dextr tab 15 mg is non formulary  Insurance Company: Medicare   Pharmacy Filling the Rx: [facility]   Filling Pharmacy Phone:  [phone]  Filling Pharmacy Fax:  [phone]  Start Date:  2/1/2022

## 2022-02-04 ENCOUNTER — NURSE TRIAGE (OUTPATIENT)
Dept: NURSING | Facility: CLINIC | Age: 82
End: 2022-02-04
Payer: MEDICARE

## 2022-02-04 DIAGNOSIS — N18.30 CHRONIC KIDNEY DISEASE, STAGE III (MODERATE) (H): Primary | ICD-10-CM

## 2022-02-04 NOTE — TELEPHONE ENCOUNTER
"Nurse Triage SBAR    Is this a 2nd Level Triage? YES, LICENSED PRACTITIONER REVIEW IS REQUIRED    Situation:  Low BP and bilateral leg cramps.  Pt states \"My BP earlier today was 97/71.\"  \"Pulse 96.\"  Asked pt to recheck BP now ...  Current /68.  Pulse 90.  \"Not dizzy, but just not feeling good.\"  \"Feel real weak.\"  \"No stamina; no energy.\"    Primary issue -> \"Severe leg cramps.\"  Onset six days ago.  \"Ever since starting torsemide and persisting even after stopping torsemide.\"  \"Had low BP in the past at times.\"  \"But the cramps are SO bad.\"    Background:   Had OV for BP and leg cramps 1/31/22.  Chart notes as follows:  Pt has stopped Lasix and stopped torsemide.  Had thought these diuretics cause cramps.  Plan as follows:  Decrease lisinopril from 40 mg to 20 mg.  If your blood pressure less than 100/60 please hold your blood pressure medication and call clinic.  Pt has complied with this advice.    Pt then had another virtual visit with PharmD 2/2/22.  Chart notes as follows:  Restart torsemide 20 mg but half tablet (10 mg) daily AM with magnesium.   Pt has taken the following since 2/2/22:  - carvedilol 3.125 twice daily  - 250 mg magnesium daily  - today took 325 mg magnesium  - \"still taking Eliquis twice daily\"  Pt stopped all lisinopril x 48 hours.  Also stopped all torsemide.    Assessment:  \"Both legs still cramp SO bad.\"  Discussed leg cramps could be due to electrolyte imbalance from kidney disease, or possible DVT.  Pt does have history of DVT.  Takes Eliquis twice daily.  Also history of RSD in lower extremities.    Routing to provider for recommendations on next steps.  Protocol Recommended Disposition: ED or UC or to Clinic with PCP Approval.  Pt already had one OV and one virtual provider visit this week for all above issues.  What else can she do?  -> new or changed medications?  -> orders for imaging to rule out DVTs?  -> other advice?    Best phone # for pt -> [phone]     Thank " "sim [facility] Nurse Advisor     Reason for Disposition    Fall in systolic BP > 20 mm Hg from normal and feeling dizzy, lightheaded, or weak    History of prior 'blood clot' in leg or lungs (i.e., deep vein thrombosis, pulmonary embolism)    Additional Information    Negative: Systolic BP < 90 and feeling dizzy, lightheaded, or weak    Negative: Started suddenly after an allergic medicine, an allergic food, or bee sting    Negative: Shock suspected (e.g., cold/pale/clammy skin, too weak to stand, low BP, rapid pulse)    Negative: Difficult to awaken or acting confused  (e.g., disoriented, slurred speech)    Negative: Fainted    Negative: Chest pain    Negative: Bleeding (e.g., vomiting blood, rectal bleeding or tarry stools, severe vaginal bleeding)    Negative: Extra heart beats or heart is beating fast  (i.e., \"palpitations\")    Negative: Sounds like a life-threatening emergency to the triager    Negative: Systolic BP < 80 and NOT dizzy, lightheaded or weak (feels normal)    Negative: Abdominal pain    Negative: Major surgery in the past month    Negative: Fever > 100.4 F (38.0 C)    Negative: Drinking very little and has signs of dehydration (e.g., no urine > 12 hours, very dry mouth, very lightheaded)    Negative: Looks like a broken bone or dislocated joint (e.g., crooked or deformed)    Negative: Sounds like a life-threatening emergency to the triager    Negative: Followed a hip injury    Negative: Followed a knee injury    Negative: Followed an ankle or foot injury    Negative: Back pain radiating (shooting) into leg(s)    Negative: Foot pain is the main symptom    Negative: Ankle pain is the main symptom    Negative: Knee pain is the main symptom    Negative: Leg swelling is the main symptom    Negative: Chest pain    Negative: Difficulty breathing    Negative: Unable to walk    Negative: Entire foot is cool or blue in comparison to other side    Negative: Fever and red area (or area very " "tender to touch)    Negative: Fever and swollen joint    Negative: Thigh or calf pain in only one leg and present > 1 hour    Negative: Thigh, calf, or ankle swelling in only one leg    Negative: Thigh, calf, or ankle swelling in both legs, but one side is definitely more swollen    Protocols used: LOW BLOOD PRESSURE-A-OH, LEG PAIN-A-OH      ______________________    COVID 19 Nurse Triage Plan/Patient Instructions    Please be aware that novel coronavirus (COVID-19) may be circulating in the community. If you develop symptoms such as fever, cough, or SOB or if you have concerns about the presence of another infection including coronavirus (COVID-19), please contact your health care provider or visit [email or web address].     Disposition/Instructions    Additional COVID19 information to add for patients.   How can I protect others?  If you have symptoms (fever, cough, body aches or trouble breathing): Stay home and away from others (self-isolate) until:    At least 10 days have passed since your symptoms started, And     You ve had no fever--and no medicine that reduces fever--for 1 full day (24 hours), And      Your other symptoms have resolved (gotten better).     If you don t have symptoms, but a test showed that you have COVID-19 (you tested positive):    Stay home and away from others (self-isolate). Follow the tips under \"How do I self-isolate?\" below for 10 days (20 days if you have a weak immune system).    You don't need to be retested for COVID-19 before going back to school or work. As long as you're fever-free and feeling better, you can go back to school, work and other activities after waiting the 10 or 20 days.     How do I self-isolate?    Stay in your own room, even for meals. Use your own bathroom if you can.     Stay away from others in your home. No hugging, kissing or shaking hands. No visitors.    Don t go to work, school or anywhere else.     Clean  high touch  surfaces often " (doorknobs, counters, handles, etc.). Use a household cleaning spray or wipes. You ll find a full list on the EPA website:  www.epa.gov/pesticide-registration/list-n-disinfectants-use-against-sars-cov-2.    Cover your mouth and nose with a mask, tissue or washcloth to avoid spreading germs.    Wash your hands and face often. Use soap and water.    Caregivers in these groups are at risk for severe illness due to COVID-19:  o People 65 years and older  o People who live in a nursing home or long-term care facility  o People with chronic disease (lung, heart, cancer, diabetes, kidney, liver, immunologic)  o People who have a weakened immune system, including those who:  - Are in cancer treatment  - Take medicine that weakens the immune system, such as corticosteroids  - Had a bone marrow or organ transplant  - Have an immune deficiency  - Have poorly controlled HIV or AIDS  - Are obese (body mass index of 40 or higher)  - Smoke regularly    Caregivers should wear gloves while washing dishes, handling laundry and cleaning bedrooms and bathrooms.    Use caution when washing and drying laundry: Don t shake dirty laundry, and use the warmest water setting that you can.    For more tips, go to www.cdc.gov/coronavirus/2019-ncov/downloads/10Things.pdf.    How can I take care of myself?  1. Get lots of rest. Drink extra fluids (unless a doctor has told you not to).     2. Take Tylenol (acetaminophen) for fever or pain. If you have liver or kidney problems, ask your family doctor if it s okay to take Tylenol.     Adults can take either:     650 mg (two 325 mg pills) every 4 to 6 hours, or     1,000 mg (two 500 mg pills) every 8 hours as needed.     Note: Don t take more than 3,000 mg in one day.   Acetaminophen is found in many medicines (both prescribed and over-the-counter medicines). Read all labels to be sure you don t take too much.     For children, check the Tylenol bottle for the right dose. The dose is based on the  child s age or weight.    3. If you have other health problems (like cancer, heart failure, an organ transplant or severe kidney disease): Call your specialty clinic if you don t feel better in the next 2 days.    4. Know when to call 911: Emergency warning signs include:    Trouble breathing or shortness of breath    Pain or pressure in the chest that doesn t go away    Feeling confused like you haven t felt before, or not being able to wake up    Bluish-colored lips or face    What are the symptoms of COVID-19?     The most common symptoms are cough, fever and trouble breathing.     Less common symptoms include body aches, chills, diarrhea (loose, watery poops), fatigue (feeling very tired), headache, runny nose, sore throat and loss of smell.    COVID-19 can cause severe coughing (bronchitis) and lung infection (pneumonia).    How does it spread?     The virus may spread when a person coughs or sneezes into the air. The virus can travel about 6 feet this way, and it can live on surfaces.      Common  (household disinfectants) will kill the virus.    Who is at risk?  Anyone can catch COVID-19 if they re around someone who has the virus.    How can others protect themselves?     Stay away from people who have COVID-19 (or symptoms of COVID-19).    Wash hands often with soap and water. Or, use hand  with at least 60% alcohol.    Avoid touching the eyes, nose or mouth.     Wear a face mask when you go out in public, when sick or when caring for a sick person.    Where can I get more information?    [facility]: About COVID-19: [email or web address]    CDC: What to Do If You re Sick: www.cdc.gov/coronavirus/2019-ncov/about/steps-when-sick.html    CDC: Ending Home Isolation: www.cdc.gov/coronavirus/2019-ncov/hcp/disposition-in-home-patients.html     CDC: Caring for Someone: www.cdc.gov/coronavirus/2019-ncov/if-you-are-sick/care-for-someone.html     [facility]: Interim Guidance for [facility]  Discharge to Home: www.[facility]./diseases/coronavirus/hcp/hospdischarge.pdf    [facility] clinical trials (COVID-19 research studies): clinicalaffairs.[facility]-clinical-trials     Below are the COVID-19 hotlines at the [place] [employer] [facility] ([facility]). Interpreters are available.   o For health questions: Call 919-156-1179 or 1-959.675.2508 (7 a.m. to 7 p.m.)  o For questions about schools and childcare: Call 956-870-1240 or 1-672.860.9105 (7 a.m. to 7 p.m.)          Thank you for taking steps to prevent the spread of this virus.  o Limit your contact with others.  o Wear a simple mask to cover your cough.  o Wash your hands well and often.    Resources    [facility]: About COVID-19: [email or web address]    CDC: What to Do If You're Sick: www.cdc.gov/coronavirus/2019-ncov/about/steps-when-sick.html    CDC: Ending Home Isolation: www.cdc.gov/coronavirus/2019-ncov/hcp/disposition-in-home-patients.html     CDC: Caring for Someone: www.cdc.gov/coronavirus/2019-ncov/if-you-are-sick/care-for-someone.html     [facility]: Interim Guidance for Hospital Discharge to Home: www.[facility]./diseases/coronavirus/hcp/hospdischarge.pdf    [facility] clinical trials (COVID-19 research studies): clinicalaffairs.[facility]-clinical-trials     Below are the COVID-19 hotlines at the [place] [facility] ([facility]). Interpreters are available.   o For health questions: Call 892-777-9478 or 1-901.296.4019 (7 a.m. to 7 p.m.)  o For questions about schools and childcare: Call 117-448-6853 or 1-828.736.2484 (7 a.m. to 7 p.m.)

## 2022-02-04 NOTE — TELEPHONE ENCOUNTER
If her leg cramps are that severe she should be seen this weekend. Her labs are not back that she had drawn this morning but this could be an electrolyte problem.     Urgent care would be fine. I'd continue drinking fluids, she could try taking an additional magnesium as well.

## 2022-02-05 ENCOUNTER — HOSPITAL ENCOUNTER (EMERGENCY)
Facility: CLINIC | Age: 82
Discharge: HOME | End: 2022-02-05
Admitting: NURSE PRACTITIONER
Payer: MEDICARE

## 2022-02-05 ENCOUNTER — OFFICE VISIT (OUTPATIENT)
Dept: FAMILY MEDICINE | Facility: CLINIC | Age: 82
End: 2022-02-05
Payer: MEDICARE

## 2022-02-05 VITALS
OXYGEN SATURATION: 96 % | HEART RATE: 101 BPM | BODY MASS INDEX: 26.5 KG/M2 | SYSTOLIC BLOOD PRESSURE: 113 MMHG | RESPIRATION RATE: 39 BRPM | TEMPERATURE: 97.6 F | DIASTOLIC BLOOD PRESSURE: 56 MMHG | WEIGHT: 152 LBS

## 2022-02-05 VITALS
OXYGEN SATURATION: 97 % | SYSTOLIC BLOOD PRESSURE: 105 MMHG | DIASTOLIC BLOOD PRESSURE: 55 MMHG | RESPIRATION RATE: 20 BRPM | BODY MASS INDEX: 26.54 KG/M2 | WEIGHT: 152.2 LBS | TEMPERATURE: 98.8 F | HEART RATE: 102 BPM

## 2022-02-05 DIAGNOSIS — Q60.0 RENAL AGENESIS, UNILATERAL: Primary | ICD-10-CM

## 2022-02-05 DIAGNOSIS — N18.9 CHRONIC KIDNEY DISEASE, UNSPECIFIED CKD STAGE: ICD-10-CM

## 2022-02-05 DIAGNOSIS — M62.81 GENERALIZED MUSCLE WEAKNESS: ICD-10-CM

## 2022-02-05 DIAGNOSIS — M79.10 MUSCLE ACHE: ICD-10-CM

## 2022-02-05 DIAGNOSIS — R51.9 ACUTE NONINTRACTABLE HEADACHE, UNSPECIFIED HEADACHE TYPE: ICD-10-CM

## 2022-02-05 DIAGNOSIS — I10 HYPERTENSION, UNSPECIFIED TYPE: ICD-10-CM

## 2022-02-05 DIAGNOSIS — N18.4 CKD (CHRONIC KIDNEY DISEASE) STAGE 4, GFR 15-29 ML/MIN (H): ICD-10-CM

## 2022-02-05 DIAGNOSIS — I95.9 HYPOTENSION, UNSPECIFIED HYPOTENSION TYPE: ICD-10-CM

## 2022-02-05 LAB
ALBUMIN SERPL-MCNC: 4.4 G/DL (ref 3.5–5)
ALBUMIN UR-MCNC: NEGATIVE MG/DL
ALP SERPL-CCNC: 58 U/L (ref 45–120)
ALT SERPL W P-5'-P-CCNC: 15 U/L (ref 0–45)
ANION GAP SERPL CALCULATED.3IONS-SCNC: 17 MMOL/L (ref 5–18)
APPEARANCE UR: CLEAR
AST SERPL W P-5'-P-CCNC: 22 U/L (ref 0–40)
ATRIAL RATE - MUSE: 95 BPM
BACTERIA #/AREA URNS HPF: ABNORMAL /HPF
BASOPHILS # BLD AUTO: 0 10E3/UL (ref 0–0.2)
BASOPHILS NFR BLD AUTO: 1 %
BILIRUB SERPL-MCNC: 0.9 MG/DL (ref 0–1)
BILIRUB UR QL STRIP: NEGATIVE
BUN SERPL-MCNC: 52 MG/DL (ref 8–28)
CALCIUM SERPL-MCNC: 9.8 MG/DL (ref 8.5–10.5)
CHLORIDE BLD-SCNC: 95 MMOL/L (ref 98–107)
CK SERPL-CCNC: 279 U/L (ref 30–190)
CO2 SERPL-SCNC: 24 MMOL/L (ref 22–31)
COLOR UR AUTO: ABNORMAL
CREAT SERPL-MCNC: 2.32 MG/DL (ref 0.6–1.1)
DIASTOLIC BLOOD PRESSURE - MUSE: NORMAL MMHG
EOSINOPHIL # BLD AUTO: 0.1 10E3/UL (ref 0–0.7)
EOSINOPHIL NFR BLD AUTO: 1 %
ERYTHROCYTE [DISTWIDTH] IN BLOOD BY AUTOMATED COUNT: 12.9 % (ref 10–15)
GFR SERPL CREATININE-BSD FRML MDRD: 21 ML/MIN/1.73M2
GLUCOSE BLD-MCNC: 111 MG/DL (ref 70–125)
GLUCOSE UR STRIP-MCNC: NEGATIVE MG/DL
HCT VFR BLD AUTO: 39 % (ref 35–47)
HGB BLD-MCNC: 13.3 G/DL (ref 11.7–15.7)
HGB UR QL STRIP: NEGATIVE
HYALINE CASTS: 5 /LPF
IMM GRANULOCYTES # BLD: 0 10E3/UL
IMM GRANULOCYTES NFR BLD: 0 %
INTERPRETATION ECG - MUSE: NORMAL
KETONES UR STRIP-MCNC: NEGATIVE MG/DL
LACTATE SERPL-SCNC: 1.6 MMOL/L (ref 0.7–2)
LEUKOCYTE ESTERASE UR QL STRIP: NEGATIVE
LYMPHOCYTES # BLD AUTO: 2 10E3/UL (ref 0.8–5.3)
LYMPHOCYTES NFR BLD AUTO: 32 %
MAGNESIUM SERPL-MCNC: 2.3 MG/DL (ref 1.8–2.6)
MCH RBC QN AUTO: 33.4 PG (ref 26.5–33)
MCHC RBC AUTO-ENTMCNC: 34.1 G/DL (ref 31.5–36.5)
MCV RBC AUTO: 98 FL (ref 78–100)
MONOCYTES # BLD AUTO: 0.8 10E3/UL (ref 0–1.3)
MONOCYTES NFR BLD AUTO: 13 %
MUCOUS THREADS #/AREA URNS LPF: PRESENT /LPF
NEUTROPHILS # BLD AUTO: 3.3 10E3/UL (ref 1.6–8.3)
NEUTROPHILS NFR BLD AUTO: 53 %
NITRATE UR QL: NEGATIVE
NRBC # BLD AUTO: 0 10E3/UL
NRBC BLD AUTO-RTO: 0 /100
P AXIS - MUSE: 70 DEGREES
PH UR STRIP: 5 [PH] (ref 5–7)
PLATELET # BLD AUTO: 177 10E3/UL (ref 150–450)
POTASSIUM BLD-SCNC: 3.6 MMOL/L (ref 3.5–5)
PR INTERVAL - MUSE: 124 MS
PROT SERPL-MCNC: 7.6 G/DL (ref 6–8)
QRS DURATION - MUSE: 88 MS
QT - MUSE: 366 MS
QTC - MUSE: 459 MS
R AXIS - MUSE: 59 DEGREES
RBC # BLD AUTO: 3.98 10E6/UL (ref 3.8–5.2)
RBC URINE: 1 /HPF
SODIUM SERPL-SCNC: 136 MMOL/L (ref 136–145)
SP GR UR STRIP: 1.01 (ref 1–1.03)
SQUAMOUS EPITHELIAL: <1 /HPF
SYSTOLIC BLOOD PRESSURE - MUSE: NORMAL MMHG
T AXIS - MUSE: 83 DEGREES
TROPONIN I SERPL-MCNC: <0.01 NG/ML (ref 0–0.29)
UROBILINOGEN UR STRIP-MCNC: <2 MG/DL
VENTRICULAR RATE- MUSE: 95 BPM
WBC # BLD AUTO: 6.2 10E3/UL (ref 4–11)
WBC URINE: 1 /HPF

## 2022-02-05 PROCEDURE — 85025 COMPLETE CBC W/AUTO DIFF WBC: CPT | Performed by: NURSE PRACTITIONER

## 2022-02-05 PROCEDURE — 36415 COLL VENOUS BLD VENIPUNCTURE: CPT | Performed by: NURSE PRACTITIONER

## 2022-02-05 PROCEDURE — 83605 ASSAY OF LACTIC ACID: CPT | Performed by: NURSE PRACTITIONER

## 2022-02-05 PROCEDURE — 82550 ASSAY OF CK (CPK): CPT | Performed by: NURSE PRACTITIONER

## 2022-02-05 PROCEDURE — 99284 EMERGENCY DEPT VISIT MOD MDM: CPT

## 2022-02-05 PROCEDURE — 81001 URINALYSIS AUTO W/SCOPE: CPT | Performed by: NURSE PRACTITIONER

## 2022-02-05 PROCEDURE — 99215 OFFICE O/P EST HI 40 MIN: CPT | Performed by: PHYSICIAN ASSISTANT

## 2022-02-05 PROCEDURE — 84484 ASSAY OF TROPONIN QUANT: CPT | Performed by: NURSE PRACTITIONER

## 2022-02-05 PROCEDURE — 83735 ASSAY OF MAGNESIUM: CPT | Performed by: NURSE PRACTITIONER

## 2022-02-05 PROCEDURE — 80053 COMPREHEN METABOLIC PANEL: CPT | Performed by: NURSE PRACTITIONER

## 2022-02-05 PROCEDURE — 93005 ELECTROCARDIOGRAM TRACING: CPT | Performed by: NURSE PRACTITIONER

## 2022-02-05 ASSESSMENT — ENCOUNTER SYMPTOMS
WEAKNESS: 1
HEADACHES: 1
FEVER: 0
DIZZINESS: 0
FREQUENCY: 1
APPETITE CHANGE: 1
FEVER: 0
ABDOMINAL PAIN: 1
FATIGUE: 1
FATIGUE: 1
DYSURIA: 0
SHORTNESS OF BREATH: 0
NAUSEA: 1
LIGHT-HEADEDNESS: 1
COUGH: 0
SORE THROAT: 0
MYALGIAS: 1

## 2022-02-05 NOTE — ED TRIAGE NOTES
Pt presents to the ED with c/o abnormal GFR and increased fatigue. Pt was sent from  due to decreased kidney function.
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
## 2022-02-05 NOTE — DISCHARGE INSTRUCTIONS
Your kidney function is slightly worse today. Your results are attached. Please discuss with your kidney doctor this week.    Your muscle pain may be a side effect of your Crestor (cholesterol medication). Stop taking this medication and discuss further with your primary care doctor on Tuesday    Stop taking your lisinopril for now. Continue to take your carvedilol.  Continue your furosemide at 10 mg daily.    Return to the emergency department for any new/worsening symptoms or other concerns.
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
## 2022-02-07 ENCOUNTER — TELEPHONE (OUTPATIENT)
Facility: CLINIC | Age: 82
End: 2022-02-07
Payer: MEDICARE

## 2022-02-07 DIAGNOSIS — N18.30 CHRONIC KIDNEY DISEASE, STAGE III (MODERATE) (H): Primary | ICD-10-CM

## 2022-02-07 DIAGNOSIS — I10 ESSENTIAL HYPERTENSION, MALIGNANT: ICD-10-CM

## 2022-02-07 NOTE — TELEPHONE ENCOUNTER
[facility] Call Center    Phone Message    May a detailed message be left on voicemail: yes     Reason for Call: Other: Pt was in ER over the weekend -low BP 60/40, Kidney GFI was 21.  She is no longer taking  lisinopril (ZESTRIL) 20 MG tablet  and rosuvastatin (CRESTOR) 40 MG tablet.  ER doctors advised she follow up with her Cardio. Please call pt to advise      Action Taken: Message routed to:  [facility] ([facility] cardio    Travel Screening: Not Applicable

## 2022-02-07 NOTE — TELEPHONE ENCOUNTER
Pt is scheduled with PCP 2/8/22.  BP medications managed by nephrology and PCP.  Pt was on Praluent with her Crestor.    Pt will follow-up with PCP 2/8/22 as scheduled. harika

## 2022-02-08 ENCOUNTER — TELEPHONE (OUTPATIENT)
Dept: PHARMACY | Facility: CLINIC | Age: 82
End: 2022-02-08

## 2022-02-08 ENCOUNTER — OFFICE VISIT (OUTPATIENT)
Dept: FAMILY MEDICINE | Facility: CLINIC | Age: 82
End: 2022-02-08
Payer: MEDICARE

## 2022-02-08 ENCOUNTER — TELEPHONE (OUTPATIENT)
Dept: CARDIOLOGY | Facility: CLINIC | Age: 82
End: 2022-02-08

## 2022-02-08 VITALS
WEIGHT: 157 LBS | DIASTOLIC BLOOD PRESSURE: 58 MMHG | SYSTOLIC BLOOD PRESSURE: 110 MMHG | BODY MASS INDEX: 27.38 KG/M2 | HEART RATE: 84 BPM | OXYGEN SATURATION: 98 %

## 2022-02-08 DIAGNOSIS — J44.9 CHRONIC OBSTRUCTIVE PULMONARY DISEASE, UNSPECIFIED COPD TYPE (H): ICD-10-CM

## 2022-02-08 DIAGNOSIS — I15.9 SECONDARY HYPERTENSION: ICD-10-CM

## 2022-02-08 DIAGNOSIS — F11.20 OPIOID TYPE DEPENDENCE, CONTINUOUS (H): ICD-10-CM

## 2022-02-08 DIAGNOSIS — F06.4 ANXIETY DISORDER DUE TO MEDICAL CONDITION: ICD-10-CM

## 2022-02-08 DIAGNOSIS — R74.8 ELEVATED CK: Primary | ICD-10-CM

## 2022-02-08 DIAGNOSIS — N18.30 STAGE 3 CHRONIC KIDNEY DISEASE, UNSPECIFIED WHETHER STAGE 3A OR 3B CKD (H): ICD-10-CM

## 2022-02-08 DIAGNOSIS — F33.2 SEVERE EPISODE OF RECURRENT MAJOR DEPRESSIVE DISORDER, WITHOUT PSYCHOTIC FEATURES (H): ICD-10-CM

## 2022-02-08 DIAGNOSIS — I27.82 OTHER CHRONIC PULMONARY EMBOLISM WITHOUT ACUTE COR PULMONALE (H): ICD-10-CM

## 2022-02-08 DIAGNOSIS — I10 ESSENTIAL HYPERTENSION, MALIGNANT: ICD-10-CM

## 2022-02-08 DIAGNOSIS — N18.4 CKD (CHRONIC KIDNEY DISEASE) STAGE 4, GFR 15-29 ML/MIN (H): ICD-10-CM

## 2022-02-08 LAB
ANION GAP SERPL CALCULATED.3IONS-SCNC: 11 MMOL/L (ref 5–18)
BUN SERPL-MCNC: 63 MG/DL (ref 8–28)
CALCIUM SERPL-MCNC: 9.2 MG/DL (ref 8.5–10.5)
CHLORIDE BLD-SCNC: 99 MMOL/L (ref 98–107)
CK SERPL-CCNC: 297 U/L (ref 30–190)
CO2 SERPL-SCNC: 27 MMOL/L (ref 22–31)
CREAT SERPL-MCNC: 1.95 MG/DL (ref 0.6–1.1)
GFR SERPL CREATININE-BSD FRML MDRD: 26 ML/MIN/1.73M2
GLUCOSE BLD-MCNC: 97 MG/DL (ref 70–125)
MAGNESIUM SERPL-MCNC: 2.4 MG/DL (ref 1.8–2.6)
POTASSIUM BLD-SCNC: 4.3 MMOL/L (ref 3.5–5)
SODIUM SERPL-SCNC: 137 MMOL/L (ref 136–145)

## 2022-02-08 PROCEDURE — 99215 OFFICE O/P EST HI 40 MIN: CPT | Performed by: FAMILY MEDICINE

## 2022-02-08 PROCEDURE — 80048 BASIC METABOLIC PNL TOTAL CA: CPT | Performed by: FAMILY MEDICINE

## 2022-02-08 PROCEDURE — 82550 ASSAY OF CK (CPK): CPT | Performed by: FAMILY MEDICINE

## 2022-02-08 PROCEDURE — 36415 COLL VENOUS BLD VENIPUNCTURE: CPT | Performed by: FAMILY MEDICINE

## 2022-02-08 PROCEDURE — 83735 ASSAY OF MAGNESIUM: CPT | Performed by: FAMILY MEDICINE

## 2022-02-08 RX ORDER — TORSEMIDE 5 MG/1
2.5-5 TABLET ORAL DAILY PRN
Qty: 30 TABLET | Refills: 1 | Status: SHIPPED | OUTPATIENT
Start: 2022-02-08 | End: 2022-03-02

## 2022-02-08 RX ORDER — VENLAFAXINE HYDROCHLORIDE 37.5 MG/1
37.5 CAPSULE, EXTENDED RELEASE ORAL DAILY
Qty: 30 CAPSULE | Refills: 1 | Status: SHIPPED | OUTPATIENT
Start: 2022-02-08 | End: 2022-03-02

## 2022-02-08 NOTE — TELEPHONE ENCOUNTER
Noted. Called to My Praluent and explained update and PA approval. Will move application along and will fax back questions.

## 2022-02-08 NOTE — TELEPHONE ENCOUNTER
Central Prior Authorization Team   Phone: [phone]    PA Initiation    Medication: Praluent  Insurance Company: [employer] - Phone [phone] Fax [phone]  Pharmacy Filling the Rx: [facility],   [place], [place] - [address] [place] [address]  Filling Pharmacy Phone: [phone]  Filling Pharmacy Fax:    Start Date: 2/8/2022

## 2022-02-08 NOTE — TELEPHONE ENCOUNTER
Spoke with Sandy, the message was meant to be for Amy. She was able to talk to Amy about the forms -- sounds like she was able to fax over her out of pocket drug spend from the pharmacy.     Could you follow up her on this?     Darlin Elise, Pharm.D., [facility]   Medication Therapy Management Pharmacist   [facility]

## 2022-02-08 NOTE — TELEPHONE ENCOUNTER
Prior Authorization Approval    Authorization Effective Date: 1/25/2022  Authorization Expiration Date:    Medication: Praluent   Approved Dose/Quantity:   Reference #:     Insurance Company: [employer] - "[employer] [phone] Fax [phone]  Expected CoPay:       CoPay Card Available:      Foundation Assistance Needed:    Which Pharmacy is filling the prescription (Not needed for infusion/clinic administered): [facility] [place] [address] [place] [address] [address] [place] [address]  Pharmacy Notified: Yes  Patient Notified: Yes

## 2022-02-08 NOTE — PROGRESS NOTES
"  Assessment & Plan     Elevated CK  -Patient with elevated CK in the ER, assessed due to her muscle cramping. She has had her statin discontinued. Will update CK again today as she is feeling better to see how this is now that she is off of her statin. Likely secondary to dehydration with the diuretic she was on  - CK total  - CK total    CKD (chronic kidney disease) stage 4, GFR 15-29 ml/min (H)  -Was prescribed Torsemide by nephrology to help with her swelling, has been having side effects, will provide her with a small pill as she is up a few pounds again  - torsemide (DEMADEX) 5 MG tablet  Dispense: 30 tablet; Refill: 1    Anxiety disorder due to medical condition  -Will start her on a low dose Effexor to see if this will help with her symptoms, will need to watch closely given her age, kidney function and questionable history of bipolar remotely.   - venlafaxine (EFFEXOR-XR) 37.5 MG 24 hr capsule  Dispense: 30 capsule; Refill: 1    Secondary hypertension  -BP doing well today, updating magnesium   - Magnesium    Chronic kidney disease, stage III (moderate) (H)  -Following with nephrology  - Basic metabolic panel    Essential hypertension, malignant  - Basic metabolic panel        47 minutes spent on the date of the encounter doing chart review, history and exam, documentation and further activities per the note       BMI:   Estimated body mass index is 27.38 kg/m  as calculated from the following:    Height as of 1/31/22: 1.613 m (5' 3.5\").    Weight as of this encounter: 71.2 kg (157 lb).           Return in about 4 weeks (around 3/8/2022) for Follow up, 40 minutes, in person.    Caitlyn Rees MD  [facility] ROMY Delgado is a 81 year old who presents for the following health issues     HPI     Hypertension Follow-up      Do you check your blood pressure regularly outside of the clinic? Yes         She notes that her blood pressure has been lower in the last few " months. She did have her statin stopped and the cramping has been quite a bit better. She did do the Praulent a few days ago.     She was taken off the blood pressure medication, the last one she took was yesterday morning. She has gained five pounds in the last few days already.     Her bones are burning and painful.She does have pain in her elbow as well this morning, in the left elbow. She doesn't know if this is from her kidney failure. She is continuing to struggle with pain due to her kidney failure issues, is working on weaning down on the Fentanyl. Currently she is using one 12.5 mcg and one 25 mcg patch. She is worried about a bigger change than this due to her kidneys.     She does think that the Vistaril is helping some. She did take two a night and four trazodone and was able to sleep 5 hours with this from 0100 until her alarm.     She is more irritable and angry lately due to the pain. She does wonder about a medication to help with this.She is agitated right now due to this.     She does think that the Adderall is helping some but still having some concentration difficulties. She does hope/wonder if 20 mg would be more beneficial.       Review of Systems   Per above      Objective    /58   Pulse 84   Wt 71.2 kg (157 lb)   LMP  (LMP Unknown)   SpO2 98%   BMI 27.38 kg/m    Body mass index is 27.38 kg/m .  Physical Exam   GENERAL: healthy, alert and no distress  MS: no gross musculoskeletal defects noted, no edema  PSYCH: concentration poor, tangential, tearful, anxious and appearance well groomed

## 2022-02-08 NOTE — TELEPHONE ENCOUNTER
Pt left a vm and wants a call back about some forms that you said you could help her fill out for medications. Please call her back at [phone]

## 2022-02-08 NOTE — TELEPHONE ENCOUNTER
Received Paperwork from [employer] that pt needs a PA for her financial help. If you need paperwork faxed to you let me know. Perla [phone].

## 2022-02-09 ENCOUNTER — OFFICE VISIT (OUTPATIENT)
Dept: PALLIATIVE MEDICINE | Facility: OTHER | Age: 82
End: 2022-02-09
Payer: MEDICARE

## 2022-02-09 ENCOUNTER — TELEPHONE (OUTPATIENT)
Dept: FAMILY MEDICINE | Facility: CLINIC | Age: 82
End: 2022-02-09
Payer: MEDICARE

## 2022-02-09 VITALS
HEIGHT: 63 IN | DIASTOLIC BLOOD PRESSURE: 69 MMHG | WEIGHT: 158.4 LBS | SYSTOLIC BLOOD PRESSURE: 142 MMHG | HEART RATE: 99 BPM | BODY MASS INDEX: 28.07 KG/M2

## 2022-02-09 DIAGNOSIS — M54.16 LUMBAR RADICULOPATHY: ICD-10-CM

## 2022-02-09 DIAGNOSIS — G89.4 CHRONIC PAIN SYNDROME: ICD-10-CM

## 2022-02-09 PROCEDURE — 99214 OFFICE O/P EST MOD 30 MIN: CPT | Performed by: ANESTHESIOLOGY

## 2022-02-09 PROCEDURE — G0463 HOSPITAL OUTPT CLINIC VISIT: HCPCS

## 2022-02-09 RX ORDER — FENTANYL 25 UG/1
1 PATCH TRANSDERMAL
COMMUNITY
End: 2022-02-09

## 2022-02-09 RX ORDER — FENTANYL 12.5 UG/1
1 PATCH TRANSDERMAL
COMMUNITY
End: 2022-02-09

## 2022-02-09 RX ORDER — DEXTROAMPHETAMINE SACCHARATE, AMPHETAMINE ASPARTATE, DEXTROAMPHETAMINE SULFATE AND AMPHETAMINE SULFATE 5; 5; 5; 5 MG/1; MG/1; MG/1; MG/1
20 TABLET ORAL 2 TIMES DAILY
Qty: 60 TABLET | Refills: 0 | Status: SHIPPED | OUTPATIENT
Start: 2022-02-09 | End: 2022-03-10

## 2022-02-09 RX ORDER — FENTANYL 25 UG/1
1 PATCH TRANSDERMAL
Qty: 10 PATCH | Refills: 0 | Status: SHIPPED | OUTPATIENT
Start: 2022-02-09 | End: 2022-03-23

## 2022-02-09 RX ORDER — FENTANYL 12.5 UG/1
1 PATCH TRANSDERMAL
Qty: 10 PATCH | Refills: 0 | Status: SHIPPED | OUTPATIENT
Start: 2022-02-09 | End: 2022-04-20

## 2022-02-09 ASSESSMENT — PAIN SCALES - GENERAL: PAINLEVEL: SEVERE PAIN (6)

## 2022-02-09 ASSESSMENT — MIFFLIN-ST. JEOR: SCORE: 1162.63

## 2022-02-09 NOTE — TELEPHONE ENCOUNTER
Left message for pt that we have her lab results and that we also sent them over in [employer]. Can call back with any questions.

## 2022-02-09 NOTE — TELEPHONE ENCOUNTER
----- Message from Darlin Elise PharmD sent at 2/9/2022 12:02 PM CST -----  [employer] message sent to patient

## 2022-02-09 NOTE — PROGRESS NOTES
02/09/22 1113   PEG: A Thee-Item Scale Assessing Pain Intensity and Interference        0 = No pain / No interference    10 = Pain as bad as you can imagine / Completely interferes   What number best describes your pain on average in the past week? 7   What number best describes how, during the past week, pain has interfered with your enjoyment of life? 10   What number best describes how, during the past week, pain has interfered with your general activity? 10   PEG Total Score 9

## 2022-02-09 NOTE — LETTER
2/9/2022         RE: Sandy Spencer  [address]  [facility] [address]        Dear Colleague,    Thank you for referring your patient, Sandy Spencer, to the [facility]. Please see a copy of my visit note below.      PEG Score 12/15/2021 2/9/2022   PEG Total Score 7.33 9     [facility] Number:  [phone]    Call with any questions about your care and for scheduling assistance.     Calls are returned Monday through Friday between 8 AM and 4:30 PM. We usually get back to you within 2 business days depending on the issue/request.    If we are prescribing your medications:    For opioid medication refills, call the clinic or send a [facility] message 7 days in advance.  Please include:    Name of requested medication    Name of the pharmacy.    For non-opioid medications, call your pharmacy directly to request a refill. Please allow 3-4 days to be processed.     Per [place] State Law:    All controlled substance prescriptions must be filled within 30 days of being written.      For those controlled substances allowing refills, pickup must occur within 30 days of last fill.      We believe regular attendance is key to your success in our program!      Any time you are unable to keep your appointment we ask that you call us at least 24 hours in advance to cancel.This will allow us to offer the appointment time to another patient.     Multiple missed appointments may lead to dismissal from the clinic.    [facility] - Office Visit    ASSESSMENT & PLAN     Sandy was seen today for pain.    Diagnoses and all orders for this visit:    Chronic pain syndrome    Lumbar radiculopathy  -     fentaNYL (DURAGESIC) 12 mcg/hr 72 hr patch; Place 1 patch onto the skin every 72 hours remove old patch.  -     fentaNYL (DURAGESIC) 25 mcg/hr 72 hr patch; Place 1 patch onto the skin every 72 hours May take with 50 mcg film  -     naloxone (NARCAN)  4 MG/0.1ML nasal spray; Spray 1 spray (4 mg) into one nostril alternating nostrils once as needed for opioid reversal every 2-3 minutes until assistance arrives  -     amphetamine-dextroamphetamine (ADDERALL) 20 MG tablet; Take 1 tablet (20 mg) by mouth 2 times daily        Patient Instructions   PLAN:  Checkout you may schedule with the new provider for bilateral lumbar epidural steroid injections.    Increase Adderall to 20 mg morning at noon.    Fentanyl 25 mcg and 12 mcg patches every 3 days for the next month.  You will call when due for a refill to discuss if you are ready to decrease to the 25 mcg alone.    Follow-up with Dr. Fitzpatrick in 3 months        -----  ARELIS FITZPATRICK MD  [facility]       SUBJECTIVE      Sandy Spencer is a 81 year old year old female who presents to clinic today for the following:     Lower extremity chronic regional pain syndrome, cervical lumbar radiculopathy complex migraine headaches, now more recently traumatic brain injury with cognitive impairment.    Accompanied today by her granddaughter who is somewhat inappropriate intrusive.    Reviewing the record was in the emergency room 2/5 for fatigue, thought to be related to her statins.    She did have evaluation at the spine center with Dr. Wang 1/14.    Describes today concerned that she has kidney disease, moving into stage V.  She has stopped her statin, changed her blood pressure medications.  She was started on venlafaxine 37.5 mg by Dr. Rees for concern for depression anxiety.    She wishes to review her fentanyl patches.  While she wanted to be able to decrease her fentanyl, has not been able to tolerate going below the 25 mcg and 12 mcg every 3 days and would like to resume this for the present until her medical situation stabilizes.    She did stop lamotrigine and zonisamide.  There was concern that Remeron could be a problem with her kidneys.    She reviews with her neck frustration that she  cannot proceed right to a radiofrequency ablation as she finds medial branch blocks very painful, recalls from past treatments.    She was referred by neurologist both to dermatologist to rheumatology.  The dermatologist indicates she has vitiligo.    She has not taken the ketamine.    Is increase the Adderall to 15 mg morning and noon, feels there is still ADD symptoms that she would like to address with a higher dose and has not had any side effects.     reviewed as expected.  Last urine drug test in April.      Current Outpatient Medications:      amphetamine-dextroamphetamine (ADDERALL) 20 MG tablet, Take 1 tablet (20 mg) by mouth 2 times daily, Disp: 60 tablet, Rfl: 0     apixaban ANTICOAGULANT (ELIQUIS) 5 MG tablet, Take 1 tablet (5 mg) by mouth 2 times daily, Disp: 180 tablet, Rfl: 3     azelastine (ASTEPRO) 0.15 % nasal spray, Spray 2 sprays into both nostrils 2 times daily , Disp: , Rfl:      Cholecalciferol (VITAMIN D-3) 125 MCG (5000 UT) TABS, Take 5,000 Units by mouth daily, Disp: , Rfl:      fentaNYL (DURAGESIC) 12 mcg/hr 72 hr patch, Place 1 patch onto the skin every 72 hours remove old patch., Disp: 10 patch, Rfl: 0     fentaNYL (DURAGESIC) 25 mcg/hr 72 hr patch, Place 1 patch onto the skin every 72 hours May take with 50 mcg film, Disp: 10 patch, Rfl: 0     hydrOXYzine (VISTARIL) 25 MG capsule, Take 1-2 capsules (25-50 mg) by mouth nightly as needed for anxiety (and insomnia), Disp: 180 capsule, Rfl: 3     levothyroxine (SYNTHROID/LEVOTHROID) 50 MCG tablet, TAKE ONE TABLET BY MOUTH EVERY DAY, Disp: 90 tablet, Rfl: 2     naloxone (NARCAN) 4 MG/0.1ML nasal spray, Spray 1 spray (4 mg) into one nostril alternating nostrils once as needed for opioid reversal every 2-3 minutes until assistance arrives, Disp: 0.2 mL, Rfl: 0     PRALUENT 75 MG/ML injectable pen, INJECT 75MG UNDER THE SKIN EVERY 14 DAYS, Disp: 12 mL, Rfl: 4     senna (SENNA-TIME) 8.6 MG tablet, Take 1-3 tablets by mouth daily , Disp: ,  Rfl:      traZODone (DESYREL) 100 MG tablet, Take 3-4 tablets (300-400 mg) by mouth At Bedtime (Patient taking differently: Take 200-400 mg by mouth At Bedtime Patient taking 2 tablets oral at HS), Disp: 360 tablet, Rfl: 1     venlafaxine (EFFEXOR-XR) 37.5 MG 24 hr capsule, Take 1 capsule (37.5 mg) by mouth daily, Disp: 30 capsule, Rfl: 1     sennosides (SENOKOT) 4.3 MG half-tab, Take 8.6 mg by mouth  (Patient not taking: Reported on 2/9/2022), Disp: , Rfl:      SUMAtriptan (IMITREX) 50 MG tablet, Take 50 mg by mouth at onset of headache  (Patient not taking: Reported on 2/8/2022), Disp: , Rfl:      torsemide (DEMADEX) 20 MG tablet, Take 10 mg by mouth daily (Patient not taking: Reported on 2/8/2022), Disp: , Rfl:      torsemide (DEMADEX) 5 MG tablet, Take 0.5-1 tablets (2.5-5 mg) by mouth daily as needed (weight gain more than 2 pouds in a day) (Patient not taking: Reported on 2/9/2022), Disp: 30 tablet, Rfl: 1     valACYclovir (VALTREX) 1000 mg tablet, Take 1 tablet (1,000 mg) by mouth daily (Patient not taking: Reported on 2/9/2022), Disp: 90 tablet, Rfl: 3      Review of Systems   General, psych, musculoskeletal, bowels and bladder otherwise normal other than above.          OBJECTIVE   BP (!) 171/102   Pulse 106   Ht 1.829 m (6')   Wt 103.8 kg (228 lb 14.4 oz)   SpO2 97%   BMI 31.04 kg/m        Physical Exam  General:  Normal appearance, no apparent distress  Cardiovascular: Normal rate  Lungs: Pulmonary effort is normal, speaking in full sentences  MSK: normal muscle bulk and tone, ROM, equal strength in all extremities  Skin: Warm and dry. No concerning rashes or lesions.  Neurologic: No focal deficit, alert and oriented x3  Psychiatric: normal mood and affect, cooperative      Total time spent: 30 minutes         02/09/22 1113   PEG: A Thee-Item Scale Assessing Pain Intensity and Interference        0 = No pain / No interference    10 = Pain as bad as you can imagine / Completely interferes   What  number best describes your pain on average in the past week? 7   What number best describes how, during the past week, pain has interfered with your enjoyment of life? 10   What number best describes how, during the past week, pain has interfered with your general activity? 10   PEG Total Score 9       Again, thank you for allowing me to participate in the care of your patient.        Sincerely,        ARELIS FITZPATRICK MD

## 2022-02-09 NOTE — PROGRESS NOTES
PEG Score 12/15/2021 2/9/2022   PEG Total Score 7.33 9     [facility] Number:  [phone]    Call with any questions about your care and for scheduling assistance.     Calls are returned Monday through Friday between 8 AM and 4:30 PM. We usually get back to you within 2 business days depending on the issue/request.    If we are prescribing your medications:    For opioid medication refills, call the clinic or send a [employer] message 7 days in advance.  Please include:    Name of requested medication    Name of the pharmacy.    For non-opioid medications, call your pharmacy directly to request a refill. Please allow 3-4 days to be processed.     Per [place] State Law:    All controlled substance prescriptions must be filled within 30 days of being written.      For those controlled substances allowing refills, pickup must occur within 30 days of last fill.      We believe regular attendance is key to your success in our program!      Any time you are unable to keep your appointment we ask that you call us at least 24 hours in advance to cancel.This will allow us to offer the appointment time to another patient.     Multiple missed appointments may lead to dismissal from the clinic.

## 2022-02-09 NOTE — PATIENT INSTRUCTIONS
PLAN:  Checkout you may schedule with the new provider for bilateral lumbar epidural steroid injections.    Increase Adderall to 20 mg morning at noon.    Fentanyl 25 mcg and 12 mcg patches every 3 days for the next month.  You will call when due for a refill to discuss if you are ready to decrease to the 25 mcg alone.    Follow-up with Dr. Lynn in 3 months

## 2022-02-10 ENCOUNTER — TELEPHONE (OUTPATIENT)
Dept: FAMILY MEDICINE | Facility: CLINIC | Age: 82
End: 2022-02-10
Payer: MEDICARE

## 2022-02-10 DIAGNOSIS — H57.9 ITCH OF LEFT EYE: Primary | ICD-10-CM

## 2022-02-10 RX ORDER — POLYMYXIN B SULFATE AND TRIMETHOPRIM 1; 10000 MG/ML; [USP'U]/ML
1-2 SOLUTION OPHTHALMIC EVERY 4 HOURS
Qty: 10 ML | Refills: 0 | Status: SHIPPED | OUTPATIENT
Start: 2022-02-10 | End: 2022-03-16

## 2022-02-10 NOTE — TELEPHONE ENCOUNTER
----- Message from Marcela Farias sent at 2/10/2022 10:20 AM CST -----  Patient notified of these results, states understanding.    Pt states the cramping has improved even further than during her visit. She has, however, gained 8# since 02/05/22-3 of them since yesterday.

## 2022-02-10 NOTE — PROGRESS NOTES
[facility] - Office Visit    ASSESSMENT & PLAN     Sandy was seen today for pain.    Diagnoses and all orders for this visit:    Chronic pain syndrome    Lumbar radiculopathy  -     fentaNYL (DURAGESIC) 12 mcg/hr 72 hr patch; Place 1 patch onto the skin every 72 hours remove old patch.  -     fentaNYL (DURAGESIC) 25 mcg/hr 72 hr patch; Place 1 patch onto the skin every 72 hours May take with 50 mcg film  -     naloxone (NARCAN) 4 MG/0.1ML nasal spray; Spray 1 spray (4 mg) into one nostril alternating nostrils once as needed for opioid reversal every 2-3 minutes until assistance arrives  -     amphetamine-dextroamphetamine (ADDERALL) 20 MG tablet; Take 1 tablet (20 mg) by mouth 2 times daily        Patient Instructions   PLAN:  Checkout you may schedule with the new provider for bilateral lumbar epidural steroid injections.    Increase Adderall to 20 mg morning at noon.    Fentanyl 25 mcg and 12 mcg patches every 3 days for the next month.  You will call when due for a refill to discuss if you are ready to decrease to the 25 mcg alone.    Follow-up with Dr. Fitzpatrick in 3 months        -----  ARELIS FITZPATRICK MD  [facility]       SUBJECTIVE      Sandy Spencer is a 81 year old year old female who presents to clinic today for the following:     Lower extremity chronic regional pain syndrome, cervical lumbar radiculopathy complex migraine headaches, now more recently traumatic brain injury with cognitive impairment.    Accompanied today by her granddaughter who is somewhat inappropriate intrusive.    Reviewing the record was in the emergency room 2/5 for fatigue, thought to be related to her statins.    She did have evaluation at the spine center with Dr. Wang 1/14.    Describes today concerned that she has kidney disease, moving into stage V.  She has stopped her statin, changed her blood pressure medications.  She was started on venlafaxine 37.5 mg by Dr. Rees for concern  for depression anxiety.    She wishes to review her fentanyl patches.  While she wanted to be able to decrease her fentanyl, has not been able to tolerate going below the 25 mcg and 12 mcg every 3 days and would like to resume this for the present until her medical situation stabilizes.    She did stop lamotrigine and zonisamide.  There was concern that Remeron could be a problem with her kidneys.    She reviews with her neck frustration that she cannot proceed right to a radiofrequency ablation as she finds medial branch blocks very painful, recalls from past treatments.    She was referred by neurologist both to dermatologist to rheumatology.  The dermatologist indicates she has vitiligo.    She has not taken the ketamine.    Is increase the Adderall to 15 mg morning and noon, feels there is still ADD symptoms that she would like to address with a higher dose and has not had any side effects.     reviewed as expected.  Last urine drug test in April.      Current Outpatient Medications:      amphetamine-dextroamphetamine (ADDERALL) 20 MG tablet, Take 1 tablet (20 mg) by mouth 2 times daily, Disp: 60 tablet, Rfl: 0     apixaban ANTICOAGULANT (ELIQUIS) 5 MG tablet, Take 1 tablet (5 mg) by mouth 2 times daily, Disp: 180 tablet, Rfl: 3     azelastine (ASTEPRO) 0.15 % nasal spray, Spray 2 sprays into both nostrils 2 times daily , Disp: , Rfl:      Cholecalciferol (VITAMIN D-3) 125 MCG (5000 UT) TABS, Take 5,000 Units by mouth daily, Disp: , Rfl:      fentaNYL (DURAGESIC) 12 mcg/hr 72 hr patch, Place 1 patch onto the skin every 72 hours remove old patch., Disp: 10 patch, Rfl: 0     fentaNYL (DURAGESIC) 25 mcg/hr 72 hr patch, Place 1 patch onto the skin every 72 hours May take with 50 mcg film, Disp: 10 patch, Rfl: 0     hydrOXYzine (VISTARIL) 25 MG capsule, Take 1-2 capsules (25-50 mg) by mouth nightly as needed for anxiety (and insomnia), Disp: 180 capsule, Rfl: 3     levothyroxine (SYNTHROID/LEVOTHROID) 50 MCG  tablet, TAKE ONE TABLET BY MOUTH EVERY DAY, Disp: 90 tablet, Rfl: 2     naloxone (NARCAN) 4 MG/0.1ML nasal spray, Spray 1 spray (4 mg) into one nostril alternating nostrils once as needed for opioid reversal every 2-3 minutes until assistance arrives, Disp: 0.2 mL, Rfl: 0     PRALUENT 75 MG/ML injectable pen, INJECT 75MG UNDER THE SKIN EVERY 14 DAYS, Disp: 12 mL, Rfl: 4     senna (SENNA-TIME) 8.6 MG tablet, Take 1-3 tablets by mouth daily , Disp: , Rfl:      traZODone (DESYREL) 100 MG tablet, Take 3-4 tablets (300-400 mg) by mouth At Bedtime (Patient taking differently: Take 200-400 mg by mouth At Bedtime Patient taking 2 tablets oral at HS), Disp: 360 tablet, Rfl: 1     venlafaxine (EFFEXOR-XR) 37.5 MG 24 hr capsule, Take 1 capsule (37.5 mg) by mouth daily, Disp: 30 capsule, Rfl: 1     sennosides (SENOKOT) 4.3 MG half-tab, Take 8.6 mg by mouth  (Patient not taking: Reported on 2/9/2022), Disp: , Rfl:      SUMAtriptan (IMITREX) 50 MG tablet, Take 50 mg by mouth at onset of headache  (Patient not taking: Reported on 2/8/2022), Disp: , Rfl:      torsemide (DEMADEX) 20 MG tablet, Take 10 mg by mouth daily (Patient not taking: Reported on 2/8/2022), Disp: , Rfl:      torsemide (DEMADEX) 5 MG tablet, Take 0.5-1 tablets (2.5-5 mg) by mouth daily as needed (weight gain more than 2 pouds in a day) (Patient not taking: Reported on 2/9/2022), Disp: 30 tablet, Rfl: 1     valACYclovir (VALTREX) 1000 mg tablet, Take 1 tablet (1,000 mg) by mouth daily (Patient not taking: Reported on 2/9/2022), Disp: 90 tablet, Rfl: 3      Review of Systems   General, psych, musculoskeletal, bowels and bladder otherwise normal other than above.          OBJECTIVE   BP (!) 171/102   Pulse 106   Ht 1.829 m (6')   Wt 103.8 kg (228 lb 14.4 oz)   SpO2 97%   BMI 31.04 kg/m        Physical Exam  General:  Normal appearance, no apparent distress  Cardiovascular: Normal rate  Lungs: Pulmonary effort is normal, speaking in full sentences  MSK: normal  muscle bulk and tone, ROM, equal strength in all extremities  Skin: Warm and dry. No concerning rashes or lesions.  Neurologic: No focal deficit, alert and oriented x3  Psychiatric: normal mood and affect, cooperative      Total time spent: 30 minutes

## 2022-02-10 NOTE — TELEPHONE ENCOUNTER
With the weight gain I'd have her try doing 2.5 mg of Torsemide. Sometimes we can do ok with every other day dosing without as much cramping

## 2022-02-10 NOTE — TELEPHONE ENCOUNTER
Patient notified.   She wants to know if he CK is increased, what does that mean? Is that why she had cramping?    Also, what do her other labs mean; BMP and magnesium?    She will follow what Dr Rees said about her Torsemide dosing.      She spoke with Kidney specialist Carvedilol 3.125mg twice daily when bp >130    Finally, left eye is swollen and itchy. Tear duct is red. Can she get some drops for this?  She believes its an infection from wearing her mask.

## 2022-02-11 ENCOUNTER — VIRTUAL VISIT (OUTPATIENT)
Dept: NEUROLOGY | Facility: CLINIC | Age: 82
End: 2022-02-11
Payer: MEDICARE

## 2022-02-11 DIAGNOSIS — F43.23 ADJUSTMENT DISORDER WITH MIXED ANXIETY AND DEPRESSED MOOD: Primary | ICD-10-CM

## 2022-02-11 PROCEDURE — 90834 PSYTX W PT 45 MINUTES: CPT | Mod: 95 | Performed by: PSYCHOLOGIST

## 2022-02-11 NOTE — TELEPHONE ENCOUNTER
Her magnesium was normal.     The ER thought that the cramping was from the elevated muscle testing which is why they recommended she stopped the cholesterol medication. I don't think that this was the problem, and rather can be just due to her kidneys not filtering as well.     The BMP went to the kidney doctors, so I'll leave it to them the full interpretation, but generally, the kidneys are working a bit better which is good.     Drops are sent into the pharmacy but I'd really recommend warm compresses on the eye as well.

## 2022-02-11 NOTE — LETTER
2/11/2022         RE: Sandy Spencer  [address]  [facility] [address]        Dear Colleague,    Thank you for referring your patient, Sandy Spencer, to the [facility]. Please see a copy of my visit note below.    Psychology Progress Note    Date: February 11, 2022    Time length and type of treatment: 49 minutes (10:04 AM - 10:53 AM), individual therapy    After review of the patient's situation, this visit was changed from an in-person visit to a  video visit via [employer] to reduce the risk of COVID 19 exposure. The last few minutes of this conversation were by telephone due to connectivity problems.) Patient was informed that policies and procedures that govern in-person sessions would also apply to  video sessions. Patient was also informed that  video sessions would be discontinued when COVID 19 exposure is no longer a concern (as determined by [facility]).     Patient location: Patient home in [place], [place]  Provider location:  [facility] - Provider remote location    Patient was in agreement with proceeding with a  video session.      Necessity: This session is necessary to address the patient's anxiety, depressed mood, and PTSD.  Today we focus on the patient's treatment plan, specifically exploring barriers to compliance with medical treatment plan.  The reader is invited to review the patient's full treatment plan in the Media section of the patient's Epic medical record.    Psychotherapeutic Technique: This writer utilized motivational interviewing, active listening, reassurance and support in the context of cognitive behavioral therapy to address the above.      MENTAL STATUS EVALUATION  Grooming: Within normal limits  Attire: Appropriate  Age: Appears Stated  Behavior Towards Examiner: Cooperative  Motor Activity: Within normal limits  Eye Contact: Appropriate  Mood: Anxious   Affect: appropriate  Speech/Language:  normal  Attention: Normal  Concentration: Sufficient  Thought Process: tangential  Thought Content: Clear    Orientation: Appeared oriented to person, place, and time, though not formally established  Memory: No evidence of impairment.  Judgement: Fair  Estimated Intelligence: Average  Demonstrated Insight: Fair  Fund of Knowledge: Adequate    Intervention:   Patient was dysregulated as she spent much of the session discussing her frustration with her fentanyl prescription and asserted that her medication list for another medication is inaccurate. Patient acknowledged dependence on fentanyl but expressed frustration at this reality and a commitment to weaning off fentanyl. Patient acknowledged using two 25 mcg fentanyl patches because she didn't have the 12 mcg patch needed to hit 37 mcg. Patient was encouraged to communicate with her prescriber related to medication concerns.     Ms. Spencer is experiencing some physical health problems and is concerned about her kidney functioning. Her granddaughter, who accompanied her to her last medical appointment, became dysregulated by the patient's health problems and has been on a drinking binge since the appointment.     Progress:  Patient is dysregulated and very focused on how the behavior of others has negatively affected her.     Plan:   We will meet again in 1 week to address the patient's anxiety, depressed mood, and PTSD.  Estimated duration of treatment is 10+ individual therapy sessions (92616) at weekly intervals. Treatment is expected to be completed by September 2022.     Diagnosis:  Adjustment Disorder with mixed anxiety and depressed mood  Posttraumatic Stress Disorder (PTSD)  Attention-Deficit/Hyperactivity Disorder, combined presentation      Again, thank you for allowing me to participate in the care of your patient.        Sincerely,        Deann Meeks Psy.D, LP

## 2022-02-11 NOTE — PROGRESS NOTES
Psychology Progress Note    Date: February 11, 2022    Time length and type of treatment: 49 minutes (10:04 AM - 10:53 AM), individual therapy    After review of the patient's situation, this visit was changed from an in-person visit to a  video visit via [employer] to reduce the risk of COVID 19 exposure. The last few minutes of this conversation were by telephone due to connectivity problems.) Patient was informed that policies and procedures that govern in-person sessions would also apply to  video sessions. Patient was also informed that  video sessions would be discontinued when COVID 19 exposure is no longer a concern (as determined by [facility]).     Patient location: Patient home in [place], [place]  Provider location:  [facility] - Provider remote location    Patient was in agreement with proceeding with a  video session.      Necessity: This session is necessary to address the patient's anxiety, depressed mood, and PTSD.  Today we focus on the patient's treatment plan, specifically exploring barriers to compliance with medical treatment plan.  The reader is invited to review the patient's full treatment plan in the Media section of the patient's Epic medical record.    Psychotherapeutic Technique: This writer utilized motivational interviewing, active listening, reassurance and support in the context of cognitive behavioral therapy to address the above.      MENTAL STATUS EVALUATION  Grooming: Within normal limits  Attire: Appropriate  Age: Appears Stated  Behavior Towards Examiner: Cooperative  Motor Activity: Within normal limits  Eye Contact: Appropriate  Mood: Anxious   Affect: appropriate  Speech/Language: normal  Attention: Normal  Concentration: Sufficient  Thought Process: tangential  Thought Content: Clear    Orientation: Appeared oriented to person, place, and time, though not formally established  Memory: No evidence of impairment.  Judgement: Fair  Estimated  Intelligence: Average  Demonstrated Insight: Fair  Fund of Knowledge: Adequate    Intervention:   Patient was dysregulated as she spent much of the session discussing her frustration with her fentanyl prescription and asserted that her medication list for another medication is inaccurate. Patient acknowledged dependence on fentanyl but expressed frustration at this reality and a commitment to weaning off fentanyl. Patient acknowledged using two 25 mcg fentanyl patches because she didn't have the 12 mcg patch needed to hit 37 mcg. Patient was encouraged to communicate with her prescriber related to medication concerns.     Ms. Spencer is experiencing some physical health problems and is concerned about her kidney functioning. Her granddaughter, who accompanied her to her last medical appointment, became dysregulated by the patient's health problems and has been on a drinking binge since the appointment.     Progress:  Patient is dysregulated and very focused on how the behavior of others has negatively affected her.     Plan:   We will meet again in 1 week to address the patient's anxiety, depressed mood, and PTSD.  Estimated duration of treatment is 10+ individual therapy sessions (21107) at weekly intervals. Treatment is expected to be completed by September 2022.     Diagnosis:  Adjustment Disorder with mixed anxiety and depressed mood  Posttraumatic Stress Disorder (PTSD)  Attention-Deficit/Hyperactivity Disorder, combined presentation

## 2022-02-11 NOTE — TELEPHONE ENCOUNTER
Spoke with patient.   Kidney doctors told patient that they would like Dr Rees to advise on the BMP. She will call them back today and see what's going on.

## 2022-02-14 PROBLEM — K86.1 CHRONIC PANCREATITIS, UNSPECIFIED PANCREATITIS TYPE (H): Status: RESOLVED | Noted: 2021-07-15 | Resolved: 2022-02-14

## 2022-02-16 ENCOUNTER — VIRTUAL VISIT (OUTPATIENT)
Dept: NEUROLOGY | Facility: CLINIC | Age: 82
End: 2022-02-16
Payer: MEDICARE

## 2022-02-16 DIAGNOSIS — F43.23 ADJUSTMENT DISORDER WITH MIXED ANXIETY AND DEPRESSED MOOD: Primary | ICD-10-CM

## 2022-02-16 PROCEDURE — 90834 PSYTX W PT 45 MINUTES: CPT | Mod: 95 | Performed by: PSYCHOLOGIST

## 2022-02-16 NOTE — LETTER
2/16/2022         RE: Sandy Spencer  [address]  [facility] [address]        Dear Colleague,    Thank you for referring your patient, Sandy Spencer, to the [facility]. Please see a copy of my visit note below.    Psychology Progress Note    Date: February 16, 2022    Time length and type of treatment: 46 minutes (10:01 AM to 10:47 AM), individual therapy    After review of the patient's situation, this visit was changed from an in-person visit to a  video visit via [employer] to reduce the risk of COVID 19 exposure. Patient was informed that policies and procedures that govern in-person sessions would also apply to  video sessions. Patient was also informed that  video sessions would be discontinued when COVID 19 exposure is no longer a concern (as determined by [facility]).     Patient location: Patient home in [place], [place]  Provider location:  [facility] - Provider remote location    Patient was in agreement with proceeding with a  video session.      Necessity: This session is necessary to address the patient's anxiety, depressed mood, and PTSD.  Today we focus on the patient's treatment plan, specifically exploring thoughts and expectations of self and others, sleep hygiene, and coping strategies.  The reader is invited to review the patient's full treatment plan in the Media section of the patient's Epic medical record.    Psychotherapeutic Technique: This writer utilized motivational interviewing, active listening, reassurance and support in the context of cognitive behavioral therapy to address the above.      MENTAL STATUS EVALUATION  Grooming: Well-groomed  Attire: Appropriate  Age: Appears Stated  Behavior Towards Examiner: Cooperative  Motor Activity: Within normal limits  Eye Contact: Appropriate  Mood: Anxious   Affect: full range  Speech/Language: normal  Attention: Easily distracted  Concentration: Sufficient  Thought  Process: tangential  Thought Content: Clear    Orientation: Appeared oriented to person, place, and time, though not formally established  Memory: No evidence of impairment.  Judgement: Adequate  Estimated Intelligence: Average  Demonstrated Insight: Adequate  Fund of Knowledge: Adequate    Intervention:   Patient discussed an interaction with her daughter in which they disagreed about a historical event, but patient was able to avoid getting into an argument with her daughter. This skillful interaction was validated and reinforced. Patient continues to struggle with sleep, and psychoeducation was provided related to sleep hygiene.  A breathing strategy was reviewed, practiced in session, and patient was provided some psychoeducation related to mindfulness and how to use her breathing to facilitate sleep.  Patient continues to be motivated to get off fentanyl and we discussed possible withdrawal symptoms and what patient needs to support her continued progress with tapering.     Progress:  Patient demonstrated increased insight into family dynamics and a strengthened commitment to weaning off fentanyl.    Plan:   We will meet again in 1 week to address the patient's anxiety, depressed mood, and PTSD.  Estimated duration of treatment is 10+ individual therapy sessions (52913) at weekly intervals. Treatment is expected to be completed by September 2022.     Diagnosis:  Adjustment Disorder with mixed anxiety and depressed mood  Posttraumatic Stress Disorder (PTSD)  Attention-Deficit/Hyperactivity Disorder, combined presentation      Again, thank you for allowing me to participate in the care of your patient.        Sincerely,        Deann Meeks Psy.D, LP

## 2022-02-16 NOTE — PROGRESS NOTES
Psychology Progress Note    Date: February 16, 2022    Time length and type of treatment: 46 minutes (10:01 AM to 10:47 AM), individual therapy    After review of the patient's situation, this visit was changed from an in-person visit to a  video visit via [employer] to reduce the risk of COVID 19 exposure. Patient was informed that policies and procedures that govern in-person sessions would also apply to  video sessions. Patient was also informed that  video sessions would be discontinued when COVID 19 exposure is no longer a concern (as determined by [facility]).     Patient location: Patient home in [place], [place]  Provider location:  [facility] - Provider remote location    Patient was in agreement with proceeding with a  video session.      Necessity: This session is necessary to address the patient's anxiety, depressed mood, and PTSD.  Today we focus on the patient's treatment plan, specifically exploring thoughts and expectations of self and others, sleep hygiene, and coping strategies.  The reader is invited to review the patient's full treatment plan in the Media section of the patient's Epic medical record.    Psychotherapeutic Technique: This writer utilized motivational interviewing, active listening, reassurance and support in the context of cognitive behavioral therapy to address the above.      MENTAL STATUS EVALUATION  Grooming: Well-groomed  Attire: Appropriate  Age: Appears Stated  Behavior Towards Examiner: Cooperative  Motor Activity: Within normal limits  Eye Contact: Appropriate  Mood: Anxious   Affect: full range  Speech/Language: normal  Attention: Easily distracted  Concentration: Sufficient  Thought Process: tangential  Thought Content: Clear    Orientation: Appeared oriented to person, place, and time, though not formally established  Memory: No evidence of impairment.  Judgement: Adequate  Estimated Intelligence: Average  Demonstrated Insight:  Adequate  Fund of Knowledge: Adequate    Intervention:   Patient discussed an interaction with her daughter in which they disagreed about a historical event, but patient was able to avoid getting into an argument with her daughter. This skillful interaction was validated and reinforced. Patient continues to struggle with sleep, and psychoeducation was provided related to sleep hygiene.  A breathing strategy was reviewed, practiced in session, and patient was provided some psychoeducation related to mindfulness and how to use her breathing to facilitate sleep.  Patient continues to be motivated to get off fentanyl and we discussed possible withdrawal symptoms and what patient needs to support her continued progress with tapering.     Progress:  Patient demonstrated increased insight into family dynamics and a strengthened commitment to weaning off fentanyl.    Plan:   We will meet again in 1 week to address the patient's anxiety, depressed mood, and PTSD.  Estimated duration of treatment is 10+ individual therapy sessions (05272) at weekly intervals. Treatment is expected to be completed by September 2022.     Diagnosis:  Adjustment Disorder with mixed anxiety and depressed mood  Posttraumatic Stress Disorder (PTSD)  Attention-Deficit/Hyperactivity Disorder, combined presentation

## 2022-02-17 ENCOUNTER — NURSE TRIAGE (OUTPATIENT)
Dept: NURSING | Facility: CLINIC | Age: 82
End: 2022-02-17
Payer: MEDICARE

## 2022-02-17 PROBLEM — Q60.0 RENAL AGENESIS, UNILATERAL: Status: ACTIVE | Noted: 2020-03-11

## 2022-02-17 NOTE — TELEPHONE ENCOUNTER
"Patient calling reporting elevated blood pressure readings this morning.    Reporting when waking this morning with feeling of heart \"fluttering\" with blood pressure reading of 131/108, at 8 a.m.  Patient took blood pressure medications around 830 a.m..    Rechecked B/P at 1005 a.m. reading 159/81 pulse 85    Reporting ongoing fatigue, \"lightheaded.\"     Denies feeling dizzy with ambulation.    See office visit 2/8/22.     Patient reporting she is taking blood pressure medication as directed.    Patient is questioning adjusting dosing.    Disposition to see provider with in 3 days.    Transferred to Central Scheduling. Reviewed with patient to call back with any change or increase in symptoms.    Magali Mayorga RN  [facility]        COVID 19 Nurse Triage Plan/Patient Instructions    Please be aware that novel coronavirus (COVID-19) may be circulating in the community. If you develop symptoms such as fever, cough, or SOB or if you have concerns about the presence of another infection including coronavirus (COVID-19), please contact your health care provider or visit [email or web address].     Disposition/Instructions    In-Person Visit with provider recommended. Reference Visit Selection Guide.    Thank you for taking steps to prevent the spread of this virus.  o Limit your contact with others.  o Wear a simple mask to cover your cough.  o Wash your hands well and often.    Resources    [facility]: About COVID-19: [email or web address]    CDC: What to Do If You're Sick: www.cdc.gov/coronavirus/2019-ncov/about/steps-when-sick.html    CDC: Ending Home Isolation: www.cdc.gov/coronavirus/2019-ncov/hcp/disposition-in-home-patients.html     CDC: Caring for Someone: www.cdc.gov/coronavirus/2019-ncov/if-you-are-sick/care-for-someone.html     [facility]: Interim Guidance for Hospital Discharge to Home: www.health.[facility].[place]/diseases/coronavirus/hcp/hospdischarge.pdf    [facility] " clinical trials (COVID-19 research studies): clinicalaffairs.[facility]/n-clinical-trials     Below are the COVID-19 hotlines at the [place] Department of Health ([facility]). Interpreters are available.   o For health questions: Call 302-205-6845 or 1-282.299.4430 (7 a.m. to 7 p.m.)  o For questions about schools and childcare: Call 941-240-2730 or 1-430.173.5941 (7 a.m. to 7 p.m.)                     Reason for Disposition    Systolic BP >= 160 OR Diastolic >= 100    Additional Information    Negative: Sounds like a life-threatening emergency to the triager    Negative: Pregnant > 20 weeks or postpartum (< 6 weeks after delivery) and new hand or face swelling    Negative: Pregnant > 20 weeks and BP > 140/90    Negative: Systolic BP >= 160 OR Diastolic >= 100, and any cardiac or neurologic symptoms (e.g., chest pain, difficulty breathing, unsteady gait, blurred vision)    Negative: Patient sounds very sick or weak to the triager    Negative: BP Systolic BP >= 140 OR Diastolic >= 90 and postpartum (from 0 to 6 weeks after delivery)    Negative: Systolic BP >= 180 OR Diastolic >= 110, and missed most recent dose of blood pressure medication    Negative: Systolic BP >= 180 OR Diastolic >= 110    Negative: Patient wants to be seen    Negative: Ran out of BP medications    Negative: Taking BP medications and feels is having side effects (e.g., impotence, cough, dizziness)    Protocols used: HIGH BLOOD PRESSURE-A-OH

## 2022-02-18 ENCOUNTER — TELEPHONE (OUTPATIENT)
Dept: NURSING | Facility: CLINIC | Age: 82
End: 2022-02-18

## 2022-02-18 ENCOUNTER — HOSPITAL ENCOUNTER (EMERGENCY)
Facility: CLINIC | Age: 82
End: 2022-02-18
Payer: MEDICARE

## 2022-02-18 ENCOUNTER — OFFICE VISIT (OUTPATIENT)
Dept: FAMILY MEDICINE | Facility: CLINIC | Age: 82
End: 2022-02-18
Payer: MEDICARE

## 2022-02-18 ENCOUNTER — TELEPHONE (OUTPATIENT)
Dept: FAMILY MEDICINE | Facility: CLINIC | Age: 82
End: 2022-02-18

## 2022-02-18 ENCOUNTER — NURSE TRIAGE (OUTPATIENT)
Dept: NURSING | Facility: CLINIC | Age: 82
End: 2022-02-18

## 2022-02-18 VITALS
HEIGHT: 63 IN | BODY MASS INDEX: 27.82 KG/M2 | WEIGHT: 157 LBS | HEART RATE: 95 BPM | OXYGEN SATURATION: 99 % | RESPIRATION RATE: 18 BRPM | DIASTOLIC BLOOD PRESSURE: 69 MMHG | TEMPERATURE: 98.2 F | SYSTOLIC BLOOD PRESSURE: 125 MMHG

## 2022-02-18 DIAGNOSIS — R00.2 PALPITATIONS: ICD-10-CM

## 2022-02-18 DIAGNOSIS — I15.1 HYPERTENSION SECONDARY TO OTHER RENAL DISORDERS: Primary | ICD-10-CM

## 2022-02-18 DIAGNOSIS — R74.8 ELEVATED CK: ICD-10-CM

## 2022-02-18 DIAGNOSIS — N18.4 CKD (CHRONIC KIDNEY DISEASE) STAGE 4, GFR 15-29 ML/MIN (H): ICD-10-CM

## 2022-02-18 LAB
ANION GAP SERPL CALCULATED.3IONS-SCNC: 10 MMOL/L (ref 5–18)
BUN SERPL-MCNC: 34 MG/DL (ref 8–28)
CALCIUM SERPL-MCNC: 9.4 MG/DL (ref 8.5–10.5)
CHLORIDE BLD-SCNC: 101 MMOL/L (ref 98–107)
CK SERPL-CCNC: 583 U/L (ref 30–190)
CO2 SERPL-SCNC: 25 MMOL/L (ref 22–31)
CREAT SERPL-MCNC: 1.69 MG/DL (ref 0.6–1.1)
GFR SERPL CREATININE-BSD FRML MDRD: 30 ML/MIN/1.73M2
GLUCOSE BLD-MCNC: 87 MG/DL (ref 70–125)
POTASSIUM BLD-SCNC: 4.1 MMOL/L (ref 3.5–5)
SODIUM SERPL-SCNC: 136 MMOL/L (ref 136–145)

## 2022-02-18 PROCEDURE — 93010 ELECTROCARDIOGRAM REPORT: CPT | Mod: OFF | Performed by: INTERNAL MEDICINE

## 2022-02-18 PROCEDURE — 36415 COLL VENOUS BLD VENIPUNCTURE: CPT | Performed by: FAMILY MEDICINE

## 2022-02-18 PROCEDURE — 82550 ASSAY OF CK (CPK): CPT | Performed by: FAMILY MEDICINE

## 2022-02-18 PROCEDURE — 99214 OFFICE O/P EST MOD 30 MIN: CPT | Performed by: FAMILY MEDICINE

## 2022-02-18 PROCEDURE — 80048 BASIC METABOLIC PNL TOTAL CA: CPT | Performed by: FAMILY MEDICINE

## 2022-02-18 PROCEDURE — 93005 ELECTROCARDIOGRAM TRACING: CPT | Performed by: FAMILY MEDICINE

## 2022-02-18 RX ORDER — CARVEDILOL 3.12 MG/1
3.12 TABLET ORAL 2 TIMES DAILY WITH MEALS
Start: 2022-02-18 | End: 2022-03-02

## 2022-02-18 ASSESSMENT — PATIENT HEALTH QUESTIONNAIRE - PHQ9
10. IF YOU CHECKED OFF ANY PROBLEMS, HOW DIFFICULT HAVE THESE PROBLEMS MADE IT FOR YOU TO DO YOUR WORK, TAKE CARE OF THINGS AT HOME, OR GET ALONG WITH OTHER PEOPLE: VERY DIFFICULT
SUM OF ALL RESPONSES TO PHQ QUESTIONS 1-9: 19
SUM OF ALL RESPONSES TO PHQ QUESTIONS 1-9: 19

## 2022-02-18 NOTE — PATIENT INSTRUCTIONS
Patient Education     Chronic Kidney Disease (CKD)   The role of the kidneys is to remove waste products and extra water from the blood. When the kidneys don't work as they should, waste products start to build up in the blood. This is called chronic kidney disease (CKD). CKD means that you have kidney damage or a decrease in kidney function lasting at least 3 months. CKD allows extra water, waste, and toxins to build up in the body. This can eventually become life-threatening. You might need dialysis or a kidney transplant to stay alive. This most severe form is called end stage renal disease.  Diabetes is the leading causes of chronic renal failure. Other causes include high blood pressure, hardening of the arteries (atherosclerosis), lupus, inflammation of the blood vessels (vasculitis), and past viral or bacterial infections. Certain over-the-counter pain medicines can cause renal failure when taken often over a long period of time. These include aspirin, ibuprofen, and related anti-inflammatory medicines called NSAIDs (nonsteroidal anti-inflammatory drugs).  Home care  The following guidelines will help you care for yourself at home:    If you have diabetes, talk with your healthcare provider about keeping your blood sugar under control. Ask if you need to make and changes to your diet, lifestyle, or medicines.    If you have high blood pressure:  ? Take prescribed medicine to lower your blood pressure to the recommended goal of less than 130/80.  ? Start a regular exercise program that you enjoy. Check with your healthcare provider to be sure your planned exercise program is right for you.  ? Eat less salt (sodium). Your healthcare provider can tell you how much salt per day is safe for you.    If you are overweight, talk with your healthcare provider about a weight loss plan.    If you smoke, you must quit. Smoking makes kidney disease worse and puts you at risk for developing other serious  illnesses. Talk with your healthcare provider about ways to help you quit.  For more information, visit the following links:  ? www.smokefree.gov/sites/default/files/pdf/clearing-the-air-accessible.pdf  ? www.smokefree.gov  ? www.cancer.org/healthy/stayawayfromtobacco/guidetoquittingsmoking/    Most people with CKD need to follow a special diet.  Be sure you understand yours. In general, you will need to limit protein, salt, potassium, and phosphorus. You also need to limit how much fluid you drink.     CKD is a risk factor for heart disease. Talk with your healthcare provider about any other risk factors you might have and what you can do to lessen them.    Talk with your healthcare provider about any medicines you are taking to find out if they need to be reduced or stopped.    For your own safety, check with your doctor before taking any medicines or supplements. Don't use the following over-the-counter medicines. Or consult your healthcare provider before using them:  ? Aspirin and NSAIDs such as ibuprofen or naproxen. Using acetaminophen for fever or pain is OK.  ? Laxatives and antacids containing magnesium or aluminum  ? Fleet or phospho soda enemas containing phosphorus  ? Certain stomach acid-blocking medicine such as cimetidine or ranitidine   ? Decongestants containing pseudoephedrine   ? Herbal supplements  Follow-up care  Follow up with your healthcare provider, or as advised. Contact one of the following for more information:    American Association of Kidney Patients www.aakp.org    National Kidney Foundation www.kidney.org    American Kidney Fund www.kidneyfund.org    National Kidney Disease Education Program www.nkdep.nih.gov  If an X-ray, ECG (cardiogram), or other diagnostic test was taken, you will be told of any new findings that may affect your care.  Call 911  Call 911 if you have any of the following:    Severe weakness, dizziness, fainting, drowsiness, or confusion    Chest pain or shortness  of breath    Heart beating fast, slow, or irregularly  When to seek medical advice  Call your healthcare provider right away if any of these occur:    Nausea or vomiting    Fever of 100.4 F (38 C) or higher, or as directed by your healthcare provider    Unexpected weight gain or swelling in the legs, ankles, or around the eyes    Decrease or absent urine output    New symptoms or symptoms that get worse  [employer] last reviewed this educational content on 10/1/2019    4575-2041 [employer]. All rights reserved. This information is not intended as a substitute for professional medical care. Always follow your healthcare professional's instructions.

## 2022-02-18 NOTE — PROGRESS NOTES
"  Assessment & Plan     (I15.1,  N28.89) Hypertension secondary to other renal disorders  (primary encounter diagnosis)  Comment: pt bp has been unstable sometimes high and sometimes low. Medication already changed. Today at office bp is good. She has no cp, sob, palpitation. EKG sinus rhythm, rate 82, no st t change.   Plan: continue current medication. Carvedilol 3.125 mg bid.     lose monitor bp at home tid and anytime she has symptoms such as headache, vision change, cp, sob etc. Advise to hold medication if bp < 100/60. Advise to call if bp > 140/90.     (R00.2) Palpitations  Comment: she had palpitation yesterday and resolved shortly. EKG: sinus rhythm.   Plan: EKG 12-lead, tracing only            (N18.4) CKD (chronic kidney disease) stage 4, GFR 15-29 ml/min (H)  Comment: CKD got worse and now stage 4. She follow-ups with nephrologist. She likes to repeat bmp today  Plan: Basic metabolic panel. Continue follow-up with nephrologist.   Torsemide 5 mg dialy.              (R74.8) Elevated CK  Comment: she had muscle pain. After Crestor stopped the pain resolved. She has elevated ck  Plan: CK total        Recheck ck.                BMI:   Estimated body mass index is 27.81 kg/m  as calculated from the following:    Height as of this encounter: 1.6 m (5' 3\").    Weight as of this encounter: 71.2 kg (157 lb).   Weight management plan: Discussed healthy diet and exercise guidelines     Return if symptoms worsen or fail to improve.    Valeria Salgado MD  [facility]    Alicia Delgado is a 81 year old who presents for the following health issues  accompanied by her daughter.    HPI     1) HTN: bp high at home yesterday  131/108, 159/81 and she felt heart fluttering and lightheaded. Today she feels normal. She takes medication as instructed. Denies cp, sob, palpitation and sweat.     Reviewed the chart: pt bp has not stable for some time. Medication has adjusted/ decreased due to low bp.     2) " "CKD stage 4. Has got worse in past 2 weeks. She follow-up with nephrologist.     3) Weight stable, same as  last visit at 2/8/2022: 157 lb.     4) muscle ache resolved after stopped Crestor. Her ck was elevated in past two weeks.     5) pt daughter asked that: are you going to do the blood test 3-4 times a week until she dies. I explained to her that due to pt has had new symptoms and bp not stable recently so she had several times of office visit, er visit and doctors did do blood test accordingly to have good care and management for her.        Review of Systems   Constitutional, HEENT, cardiovascular, pulmonary, gi and gu systems are negative, except as otherwise noted.      Objective    /69 (BP Location: Right arm, Patient Position: Sitting, Cuff Size: Adult Large)   Pulse 95   Temp 98.2  F (36.8  C) (Oral)   Resp 18   Ht 1.6 m (5' 3\")   Wt 71.2 kg (157 lb)   LMP  (LMP Unknown)   SpO2 99%   Breastfeeding No   BMI 27.81 kg/m    Body mass index is 27.81 kg/m .  Physical Exam   GENERAL: healthy, alert and no distress  NECK: no adenopathy, no asymmetry, masses, or scars and thyroid normal to palpation  RESP: lungs clear to auscultation - no rales, rhonchi or wheezes  CV: regular rate and rhythm, normal S1 S2, no S3 or S4, no murmur, click or rub, no peripheral edema and peripheral pulses strong.     ABDOMEN: soft, nontender, no hepatosplenomegaly, no masses and bowel sounds normal  MS: no gross musculoskeletal defects noted, no edema     Answers for HPI/ROS submitted by the patient on 2/18/2022  If you checked off any problems, how difficult have these problems made it for you to do your work, take care of things at home, or get along with other people?: Very difficult  PHQ9 TOTAL SCORE: 19      "

## 2022-02-18 NOTE — LETTER
February 20, 2022      Sandy ELSIE Spencer  [address] [place] [address]        Dear ,    We are writing to inform you of your test results.    Your electrolytes, kidneys look stable.     Resulted Orders   Basic metabolic panel   Result Value Ref Range    Sodium 136 136 - 145 mmol/L    Potassium 4.1 3.5 - 5.0 mmol/L    Chloride 101 98 - 107 mmol/L    Carbon Dioxide (CO2) 25 22 - 31 mmol/L    Anion Gap 10 5 - 18 mmol/L    Urea Nitrogen 34 (H) 8 - 28 mg/dL    Creatinine 1.69 (H) 0.60 - 1.10 mg/dL    Calcium 9.4 8.5 - 10.5 mg/dL    Glucose 87 70 - 125 mg/dL    GFR Estimate 30 (L) >60 mL/min/1.73m2      Comment:      Effective December 21, 2021 eGFRcr in adults is calculated using the 2021 CKD-EPI creatinine equation which includes age and gender (Soni et al., NEJM, DOI: 10.1056/MGDJon2531599)       If you have any questions or concerns, please call the clinic at the number listed above.       Sincerely,      Caitlyn Rees MD

## 2022-02-19 LAB
ATRIAL RATE - MUSE: 82 BPM
DIASTOLIC BLOOD PRESSURE - MUSE: NORMAL MMHG
INTERPRETATION ECG - MUSE: NORMAL
P AXIS - MUSE: 56 DEGREES
PR INTERVAL - MUSE: 122 MS
QRS DURATION - MUSE: 78 MS
QT - MUSE: 368 MS
QTC - MUSE: 429 MS
R AXIS - MUSE: 34 DEGREES
SYSTOLIC BLOOD PRESSURE - MUSE: NORMAL MMHG
T AXIS - MUSE: 76 DEGREES
VENTRICULAR RATE- MUSE: 82 BPM

## 2022-02-19 ASSESSMENT — PATIENT HEALTH QUESTIONNAIRE - PHQ9: SUM OF ALL RESPONSES TO PHQ QUESTIONS 1-9: 19

## 2022-02-19 NOTE — TELEPHONE ENCOUNTER
Called by lab reg ck 583, higher than 10 days ago that was 297. Reviewed the lab, cr 1.69 and gfr 30 that improved compared to 10 days ago.     Pt was doing well at office visit today. No cp, sob, headache, muscle ache.  Normal vitals. EKG: sinus rhythm, septal infarct age undetermined same as 3/15/2021.   I called er provider to consult about high ck and abnormal ekg. Er provider state since pt is doing well with ck 583 and abnormal ekg, she is ok to follow-up as outpatient.     I called pt, she is doing well. No cp, sob, headache, muscle ache. She has good urine output. Advise to push water. Advise to follow-up with either me, or Dr. Rees next week. Advise to go to er if she develops cp, sob, headache, muscle pain etc, she understands.     Valeria Salgado MD on 2/18/2022 at 8:40 PM

## 2022-02-19 NOTE — ED NOTES
Expected Patient Referral to ED  8:17 PM    Referring Clinic/Provider:  Dr. Salgado    Reason for referral/Clinical facts:  Ongoing elevated ck.  She was seen at the beginning of February for muscle aches and pains and at that time she was on Crestor so they advised that she stop the Crestor to see if that helped.  Today she was seen for follow-up in clinic and although her muscle aches and pains have resolved, her CK was up a little bit today.  She had previously had been 250s then to 90s at a follow-up visit and now it is around 500.  Question was whether or not she needs to come to the ER for this today.    Recommendations provided:  Consider further workup/management as an outpatient    We discussed that I am unfamiliar with how long exactly it takes for patient to be off of Crestor before you would see an improvement in the CK.  The patient does have a nephrologist, though, and I think that would be a good person to ask at what point they need to recheck this again and see if it has returned to normal and if it has not what the next steps would be and working it up.  Certainly at this level especially with improved muscle pain she does not need to present back to the emergency department for this as it is nonemergent at this level.  Her physician was comfortable with that plan and as she already has a nephrologist they will reach out to the nephrology group next week to see what their recommendations are.    Discussed that if direct admit is sought and any hurdles are encountered, this ED would be happy to see the patient and evaluate.    Informed caller that recommendations provided are recommendations based only on the facts provided and that they responsible to accept or reject the advice, or to seek a formal in person consultation as needed and that this ED will see/treat patient should they arrive.      Page Powell MD  Emergency Medicine  [facility] EMERGENCY ROOM  [address]  [facility] [id number]  [phone]       Page Powell MD  02/18/22 2026

## 2022-02-19 NOTE — TELEPHONE ENCOUNTER
Lab calling with critical CK of 583- elevated in the past after crestor, last CK was 279 on 2/8/22.      1940 called answering service for [facility], unable to get answer as of 1946.    1952-Dr. Salgado called back. She will call patient back.      Domonique Williamson, RN [facility]

## 2022-02-19 NOTE — TELEPHONE ENCOUNTER
Pt spoke with Dr. Salgado a short while ago, now having new symptoms and wishes to speak to him again.     Writer paged Dr. Salgado to pt and advised pt to call back if no response.    Pt verbalizes understanding and agrees to plan.     Reason for Disposition    [1] Caller requests to speak ONLY to PCP AND [2] urgent question    Protocols used: PCP CALL - NO TRIAGE-A-

## 2022-02-21 ENCOUNTER — OFFICE VISIT (OUTPATIENT)
Dept: FAMILY MEDICINE | Facility: CLINIC | Age: 82
End: 2022-02-21
Payer: MEDICARE

## 2022-02-21 ENCOUNTER — VIRTUAL VISIT (OUTPATIENT)
Dept: NEUROLOGY | Facility: CLINIC | Age: 82
End: 2022-02-21
Payer: MEDICARE

## 2022-02-21 ENCOUNTER — TELEPHONE (OUTPATIENT)
Dept: PHARMACY | Facility: CLINIC | Age: 82
End: 2022-02-21

## 2022-02-21 VITALS
BODY MASS INDEX: 28 KG/M2 | DIASTOLIC BLOOD PRESSURE: 72 MMHG | HEIGHT: 63 IN | WEIGHT: 158 LBS | HEART RATE: 90 BPM | SYSTOLIC BLOOD PRESSURE: 118 MMHG | OXYGEN SATURATION: 96 %

## 2022-02-21 DIAGNOSIS — M79.605 PAIN IN BOTH LOWER EXTREMITIES: ICD-10-CM

## 2022-02-21 DIAGNOSIS — I10 HYPERTENSION, UNSPECIFIED TYPE: ICD-10-CM

## 2022-02-21 DIAGNOSIS — M79.604 PAIN IN BOTH LOWER EXTREMITIES: ICD-10-CM

## 2022-02-21 DIAGNOSIS — N18.32 STAGE 3B CHRONIC KIDNEY DISEASE (H): ICD-10-CM

## 2022-02-21 DIAGNOSIS — F43.23 ADJUSTMENT DISORDER WITH MIXED ANXIETY AND DEPRESSED MOOD: Primary | ICD-10-CM

## 2022-02-21 DIAGNOSIS — R74.8 ELEVATED CK: Primary | ICD-10-CM

## 2022-02-21 PROCEDURE — 82550 ASSAY OF CK (CPK): CPT | Performed by: FAMILY MEDICINE

## 2022-02-21 PROCEDURE — 36415 COLL VENOUS BLD VENIPUNCTURE: CPT | Performed by: FAMILY MEDICINE

## 2022-02-21 PROCEDURE — 90834 PSYTX W PT 45 MINUTES: CPT | Mod: 95 | Performed by: PSYCHOLOGIST

## 2022-02-21 PROCEDURE — 99214 OFFICE O/P EST MOD 30 MIN: CPT | Performed by: FAMILY MEDICINE

## 2022-02-21 NOTE — LETTER
2/21/2022         RE: Sandy Spencer  [address]  [facility] [address]        Dear Colleague,    Thank you for referring your patient, Sandy Spencer, to the [facility]. Please see a copy of my visit note below.    Psychology Progress Note    Date: February 21, 2022    Time length and type of treatment: 47 minutes (10:03 AM to 10:50 AM), individual therapy    After review of the patient's situation, this visit was changed from an in-person visit to a video visit via [employer] to reduce the risk of COVID 19 exposure. Patient was informed that policies and procedures that govern in-person sessions would also apply to video sessions. Patient was also informed that video sessions would be discontinued when COVID 19 exposure is no longer a concern (as determined by [facility]).     Patient location: Patient home in [place], [place]  Provider location:  [facility] - Provider remote location    Patient was in agreement with proceeding with a video session.      Necessity: This session is necessary to address the patient's anxiety, depressed mood, and PTSD.  Today we focus on the patient's treatment plan, specifically exploring compliance with medical treatment plan.  The reader is invited to review the patient's full treatment plan in the Media section of the patient's Epic medical record.    Psychotherapeutic Technique: This writer utilized motivational interviewing, active listening, reassurance and support in the context of cognitive behavioral therapy to address the above.      MENTAL STATUS EVALUATION  Grooming: Well-groomed  Attire: Appropriate  Age: Appears Stated  Behavior Towards Examiner: Cooperative  Motor Activity: Within normal limits  Eye Contact: Appropriate  Mood: Depressed   Affect: full range  Speech/Language: normal  Attention: Normal  Concentration: Sufficient  Thought Process: tangential  Thought Content: Clear    Orientation:  Appeared oriented to person, place, and time, though not formally established  Memory: No evidence of impairment.  Judgement: Adequate  Estimated Intelligence: Average  Demonstrated Insight: Adequate  Fund of Knowledge: Adequate    Intervention:   Patient was provided supportive psychotherapy as she discussed her physical health concerns, problems sorting out transportation for medical appointments, challenges of scheduling timely follow up appointments, concerns about the cost of medicines, and process of finding services to help her.      Progress:  Patient is actively problem solving barriers to health care.     Plan:   We will meet again in 1 week to address the patient's anxiety, depressed mood, and PTSD.  Estimated duration of treatment is 10+ individual therapy sessions (53721) at weekly intervals. Treatment is expected to be completed by September 2022.     Diagnosis:  Adjustment Disorder with mixed anxiety and depressed mood  Posttraumatic Stress Disorder (PTSD)  Attention-Deficit/Hyperactivity Disorder, combined presentation      Again, thank you for allowing me to participate in the care of your patient.        Sincerely,        Deann Meeks Psy.D, LP

## 2022-02-21 NOTE — TELEPHONE ENCOUNTER
Called patient back and gave her Amy Hurtado phone number  [phone].    Caitlyn Gutierrez, Pharm.D, BCACP  Medication Therapy Management Pharmacist

## 2022-02-21 NOTE — PROGRESS NOTES
Assessment & Plan     (R74.8) Elevated CK  (primary encounter diagnosis)  Comment: elevated ck since 2/5/2022.  and getting higher. Troponin normal  at 2/5/2022.    Crestor stopped 2 weeks ago.  She has legs pain , cramps that is chronic and  is stable. She state she drinks adequate water (6 cups) and has good urine out put. bp stable and normal. No cp, headache, sob, back pain. Unknown etiology. DDx: likely  inflammatory myopathy (polymyositis and dermatomyositis), vs drug ( Crestor )  induced muscle injury.   The lab had difficulty to get blood today, I am not sure if she drink adequate water.     She has ckd stage 3, improved a little bit that might  have porlonged clearance of the CK.   Plan: CK total        Advise to push water 6-8 cups of water and make sure have good urine output  Will monitor ck.     Will  referral to  Rheumatologist.   If she develops cp, sob, headache, n/v/d she needs go to er.      (N18.32) Stage 3b chronic kidney disease (H)  Comment: improved a little from stage 4 to stage 3b. ckd might negatively affect the clearance of the ck.   Plan: advise to avoid otc NSAIDs.     (M79.604) Pain in both lower extremities  She has pain and cramps. Chronic. Crestor stopped and pain was better and now still has pain. With elevated ck.  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 She will see rheumatologist and like muscle biopsy.     (I10) Hypertension. Unspecified type.   She takes medication as instructed and she monitor bp at home and bp reasonable control   Plan: continue current medication.       Return if symptoms worsen or fail to improve.    Valeria Salgado MD  [facility] ROMY Delgado is a 81 year old who presents for the following health issues     HPI     1) elevated CK. Higher than 2 weeks ago. She drinks adequate fluid 6 cups and have good urine out put. He has bilateral legs pain, Cramps that is chronic. She state she feels the pants are smaller and tight. Denies  "sob, cp, headache, n/v/d/c.    2) ckd stage 3. Improved a little bit. She does not take NSAIDs.       Review of Systems   Constitutional, HEENT, cardiovascular, pulmonary, gi and gu systems are negative, except as otherwise noted.      Objective    /72   Pulse 90   Ht 1.6 m (5' 3\")   Wt 71.7 kg (158 lb)   LMP  (LMP Unknown)   SpO2 96%   Breastfeeding No   BMI 27.99 kg/m    Body mass index is 27.99 kg/m .  Physical Exam   GENERAL: healthy, alert and no distress  NECK: no adenopathy, no asymmetry, masses, or scars and thyroid normal to palpation  RESP: lungs clear to auscultation - no rales, rhonchi or wheezes  CV: regular rate and rhythm, normal S1 S2, no S3 or S4, no murmur, click or rub, no peripheral edema and peripheral pulses strong  ABDOMEN: soft, nontender, no hepatosplenomegaly, no masses and bowel sounds normal  MS: no gross musculoskeletal defects noted. Generalized mild to moderate tenderness legs bilateral. Very mild swelling at ankle. No erythema and warmth. No color change.  no sign of DVT. Calves and thighs are soft. color is normal,   Gi's sign is negative.  Pedal pulses are normal.           Addendum:   I was paged by RN reg the [facility]  yesterday evening   I called pt 2 times yesterday evening at 6:42 pm and 7:06 pm and pt was not available.  LMV.     Valeria Salgado MD on 2/23/2022 at 10:07 AM      "

## 2022-02-21 NOTE — TELEPHONE ENCOUNTER
"Pt needs the phone number for \"Hang.\" Pt states she was told by Darlin to call this person weekly until they answered or call her back; however she's been calling our numbver  looking for \"Hang.\" Please call pt back to clarify. Pt's call back number is       "

## 2022-02-22 ENCOUNTER — TELEPHONE (OUTPATIENT)
Dept: NURSING | Facility: CLINIC | Age: 82
End: 2022-02-22

## 2022-02-22 ENCOUNTER — OFFICE VISIT (OUTPATIENT)
Dept: PHYSICAL MEDICINE AND REHAB | Facility: CLINIC | Age: 82
End: 2022-02-22
Payer: MEDICARE

## 2022-02-22 VITALS
WEIGHT: 157 LBS | HEART RATE: 87 BPM | SYSTOLIC BLOOD PRESSURE: 131 MMHG | BODY MASS INDEX: 27.81 KG/M2 | DIASTOLIC BLOOD PRESSURE: 69 MMHG

## 2022-02-22 DIAGNOSIS — M99.04 SOMATIC DYSFUNCTION OF SACROILIAC JOINT: ICD-10-CM

## 2022-02-22 DIAGNOSIS — M99.05 SOMATIC DYSFUNCTION OF PELVIS REGION: ICD-10-CM

## 2022-02-22 DIAGNOSIS — M47.812 CERVICAL SPONDYLOSIS WITHOUT MYELOPATHY: ICD-10-CM

## 2022-02-22 DIAGNOSIS — M48.061 FORAMINAL STENOSIS OF LUMBAR REGION: Primary | ICD-10-CM

## 2022-02-22 DIAGNOSIS — M99.01 SOMATIC DYSFUNCTION OF CERVICAL REGION: ICD-10-CM

## 2022-02-22 DIAGNOSIS — M99.02 SOMATIC DYSFUNCTION OF THORACIC REGION: ICD-10-CM

## 2022-02-22 DIAGNOSIS — M99.06 SOMATIC DYSFUNCTION OF LOWER EXTREMITY: ICD-10-CM

## 2022-02-22 DIAGNOSIS — M99.03 SOMATIC DYSFUNCTION OF LUMBAR REGION: ICD-10-CM

## 2022-02-22 DIAGNOSIS — M99.08 SOMATIC DYSFUNCTION OF RIB REGION: ICD-10-CM

## 2022-02-22 DIAGNOSIS — M99.07 SOMATIC DYSFUNCTION OF UPPER EXTREMITY: ICD-10-CM

## 2022-02-22 DIAGNOSIS — M99.00 SOMATIC DYSFUNCTION OF HEAD REGION: ICD-10-CM

## 2022-02-22 DIAGNOSIS — M79.18 MYOFASCIAL PAIN: ICD-10-CM

## 2022-02-22 LAB — CK SERPL-CCNC: 539 U/L (ref 30–190)

## 2022-02-22 PROCEDURE — 98929 OSTEOPATH MANJ 9-10 REGIONS: CPT | Performed by: PHYSICAL MEDICINE & REHABILITATION

## 2022-02-22 PROCEDURE — 99213 OFFICE O/P EST LOW 20 MIN: CPT | Mod: 25 | Performed by: PHYSICAL MEDICINE & REHABILITATION

## 2022-02-22 ASSESSMENT — PAIN SCALES - GENERAL: PAINLEVEL: MODERATE PAIN (4)
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
## 2022-02-22 NOTE — PATIENT INSTRUCTIONS
Your ck is elevated. I will monitor it. Advise to push water and make sure you have good urine output.   Your kidney function is abnormal but improved a little bit that is good news. Advise to avoid over the counter NSAIDs such as motrin, aleve, advil and naprosyn etc

## 2022-02-23 ENCOUNTER — TELEPHONE (OUTPATIENT)
Dept: FAMILY MEDICINE | Facility: CLINIC | Age: 82
End: 2022-02-23
Payer: MEDICARE

## 2022-02-23 NOTE — TELEPHONE ENCOUNTER
Felicia calling from [facility] with critical value;    CK - [phone]    Page sent to Dr.Jing Salgado who advised;  Dr. Salgado will call patient.    Lindy Liriano RN, Nurse Advisor 6:44 PM 2/22/2022

## 2022-02-28 ENCOUNTER — VIRTUAL VISIT (OUTPATIENT)
Dept: NEUROLOGY | Facility: CLINIC | Age: 82
End: 2022-02-28
Payer: MEDICARE

## 2022-02-28 ENCOUNTER — VIRTUAL VISIT (OUTPATIENT)
Dept: RHEUMATOLOGY | Facility: CLINIC | Age: 82
End: 2022-02-28
Payer: MEDICARE

## 2022-02-28 ENCOUNTER — TELEPHONE (OUTPATIENT)
Dept: RHEUMATOLOGY | Facility: CLINIC | Age: 82
End: 2022-02-28

## 2022-02-28 DIAGNOSIS — R74.8 ELEVATED CK: Primary | ICD-10-CM

## 2022-02-28 DIAGNOSIS — M79.605 PAIN IN BOTH LOWER EXTREMITIES: ICD-10-CM

## 2022-02-28 DIAGNOSIS — M25.50 POLYARTHRALGIA: ICD-10-CM

## 2022-02-28 DIAGNOSIS — M79.604 PAIN IN BOTH LOWER EXTREMITIES: ICD-10-CM

## 2022-02-28 DIAGNOSIS — F43.23 ADJUSTMENT DISORDER WITH MIXED ANXIETY AND DEPRESSED MOOD: Primary | ICD-10-CM

## 2022-02-28 PROCEDURE — 99204 OFFICE O/P NEW MOD 45 MIN: CPT | Mod: 95 | Performed by: INTERNAL MEDICINE

## 2022-02-28 PROCEDURE — 90834 PSYTX W PT 45 MINUTES: CPT | Mod: 95 | Performed by: PSYCHOLOGIST
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
## 2022-02-28 NOTE — PROGRESS NOTES
Psychology Progress Note    Date: February 28, 2022     Time length and type of treatment: 41 minutes (10:08 AM - 10:49 AM), individual therapy    After review of the patient's situation, this visit was changed from an in-person visit to a video visit via [employer] to reduce the risk of COVID 19 exposure. Patient was informed that policies and procedures that govern in-person sessions would also apply to video sessions. Patient was also informed that video sessions would be discontinued when COVID 19 exposure is no longer a concern (as determined by [facility]).     Patient location: Patient home in [place], [place]  Provider location:  [facility] - Provider remote location    Patient was in agreement with proceeding with a video session.      Necessity: This session is necessary to address the patient's anxiety, depressed mood, and PTSD. Today we focus on the patient's treatment plan, specifically exploring thoughts and expectations of self and others. The reader is invited to review the patient's full treatment plan in the Media section of the patient's Epic medical record.    Psychotherapeutic Technique: This writer utilized motivational interviewing, active listening, reassurance and support in the context of cognitive behavioral therapy to address the above.      MENTAL STATUS EVALUATION  Grooming: Within normal limits  Attire: Appropriate  Age: Appears Stated  Behavior Towards Examiner: Cooperative  Motor Activity: Within normal limits  Eye Contact: Appropriate  Mood: Anxious   Affect: full range  Speech/Language: normal  Attention: Normal  Concentration: Sufficient  Thought Process: tangential  Thought Content: Clear    Orientation: Appeared oriented to person, place, and time, though not formally established  Memory: No evidence of impairment.  Judgement: Adequate  Estimated Intelligence: Average  Demonstrated Insight: Adequate  Fund of Knowledge: Adequate    Intervention:    Patient was provided supportive psychotherapy as she discussed her concerns related to two granddaughters, both of whom struggle with drug and alcohol problems, and her inability to intervene.  Patient discussed how her move to [place] for 18 years negatively affected her ability to build close relationships with her grandchildren when they were young, and her disappointment that they do not appear interested in building a relationship with her now. Patient does not currently see a path toward trying to build adult relationships with her grandchildren.  She would like more social support, but was able to identify some social outlets, especially with a group of women she has resumed playing cards.     Progress:  Patient demonstrated increased acceptance of her relationship with her grandchildren.     Plan:   We will meet again in 1 week to address the patient's anxiety, depressed mood, and PTSD.  Estimated duration of treatment is 10+ individual therapy sessions (19947) at weekly intervals. Treatment is expected to be completed by September 2022.     Diagnosis:  Adjustment Disorder with mixed anxiety and depressed mood  Posttraumatic Stress Disorder (PTSD)  Attention-Deficit/Hyperactivity Disorder, combined presentation

## 2022-02-28 NOTE — LETTER
2/28/2022         RE: Sandy Spencer  [address]  [facility] [address]        Dear Colleague,    Thank you for referring your patient, Sandy Spencer, to the [facility]. Please see a copy of my visit note below.    Psychology Progress Note    Date: February 28, 2022     Time length and type of treatment: 41 minutes (10:08 AM - 10:49 AM), individual therapy    After review of the patient's situation, this visit was changed from an in-person visit to a video visit via [employer] to reduce the risk of COVID 19 exposure. Patient was informed that policies and procedures that govern in-person sessions would also apply to video sessions. Patient was also informed that video sessions would be discontinued when COVID 19 exposure is no longer a concern (as determined by [facility]).     Patient location: Patient home in [place], [place]  Provider location:  [facility] - Provider remote location    Patient was in agreement with proceeding with a video session.      Necessity: This session is necessary to address the patient's anxiety, depressed mood, and PTSD. Today we focus on the patient's treatment plan, specifically exploring thoughts and expectations of self and others. The reader is invited to review the patient's full treatment plan in the Media section of the patient's Epic medical record.    Psychotherapeutic Technique: This writer utilized motivational interviewing, active listening, reassurance and support in the context of cognitive behavioral therapy to address the above.      MENTAL STATUS EVALUATION  Grooming: Within normal limits  Attire: Appropriate  Age: Appears Stated  Behavior Towards Examiner: Cooperative  Motor Activity: Within normal limits  Eye Contact: Appropriate  Mood: Anxious   Affect: full range  Speech/Language: normal  Attention: Normal  Concentration: Sufficient  Thought Process: tangential  Thought Content: Clear     Orientation: Appeared oriented to person, place, and time, though not formally established  Memory: No evidence of impairment.  Judgement: Adequate  Estimated Intelligence: Average  Demonstrated Insight: Adequate  Fund of Knowledge: Adequate    Intervention:   Patient was provided supportive psychotherapy as she discussed her concerns related to two granddaughters, both of whom struggle with drug and alcohol problems, and her inability to intervene.  Patient discussed how her move to [place] for 18 years negatively affected her ability to build close relationships with her grandchildren when they were young, and her disappointment that they do not appear interested in building a relationship with her now. Patient does not currently see a path toward trying to build adult relationships with her grandchildren.  She would like more social support, but was able to identify some social outlets, especially with a group of women she has resumed playing cards.     Progress:  Patient demonstrated increased acceptance of her relationship with her grandchildren.     Plan:   We will meet again in 1 week to address the patient's anxiety, depressed mood, and PTSD.  Estimated duration of treatment is 10+ individual therapy sessions (34812) at weekly intervals. Treatment is expected to be completed by September 2022.     Diagnosis:  Adjustment Disorder with mixed anxiety and depressed mood  Posttraumatic Stress Disorder (PTSD)  Attention-Deficit/Hyperactivity Disorder, combined presentation      Again, thank you for allowing me to participate in the care of your patient.        Sincerely,        Deann Meeks Psy.D, LP

## 2022-03-01 ENCOUNTER — HOSPITAL ENCOUNTER (OUTPATIENT)
Dept: PHYSICAL THERAPY | Facility: REHABILITATION | Age: 82
End: 2022-03-01
Payer: MEDICARE

## 2022-03-01 DIAGNOSIS — G89.4 CHRONIC PAIN SYNDROME: ICD-10-CM

## 2022-03-01 DIAGNOSIS — M79.18 MYOFASCIAL PAIN: ICD-10-CM

## 2022-03-01 DIAGNOSIS — G90.523 COMPLEX REGIONAL PAIN SYNDROME TYPE 1 OF BOTH LOWER EXTREMITIES: ICD-10-CM

## 2022-03-01 DIAGNOSIS — M54.16 LUMBAR RADICULOPATHY: Primary | ICD-10-CM

## 2022-03-01 PROCEDURE — 97112 NEUROMUSCULAR REEDUCATION: CPT | Mod: GP | Performed by: PHYSICAL THERAPIST

## 2022-03-01 PROCEDURE — 97140 MANUAL THERAPY 1/> REGIONS: CPT | Mod: GP | Performed by: PHYSICAL THERAPIST

## 2022-03-01 NOTE — PROGRESS NOTES
Medication Therapy Management (MTM) Encounter    ASSESSMENT:                            Hyperlipidemia/PAD: Patient to continue Praluent, ok to remain off rosuvastatin due to CK issues. She will be having her level recheck in a few days and I will defer to rheum regarding her CK.     Hypertension: BP has been slightly above goal <130/80 mmHg due to CKD. Therefore will increase her carvedilol to 6.25 mg twice daily, which might also help her racing heart in the morning. Will recheck BP at upcoming appointments. I encouraged her to remain off lisinopril for now unless instructed by nephrology.      Edema: Patient is on a slightly higher dose of torsemide per nephrology - will update level on file. Renal function will be checked on 3/4 but I will add electrolytes and Mag. Will continue to monitor her edema.     Chronic pain: Continue to follow with the pain clinic.     Depression/anxiety: Continue Adderall dose as her fatigue may be due to a possible autoimmune condition, elevated BP, and/or depression. We discussed increasing the venlafaxine dose to 75 mg and she was interested. Will follow up in 3 weeks.       PLAN:                            1.  Increase carvedilol to 6.25 mg twice daily  2.  Increase venlafaxine XR to 75 mg daily  3.  Rx refilled for torsemide 10 mg per nephrology - Future BMP and Mag level     Follow-up: 3-week phone follow-up     SUBJECTIVE/OBJECTIVE:                          Snady Spencer is a 81 year old female called for a follow up visit. She was referred from Dr. Rees for polypharmacy.     Reason for visit: Follow up since we last spoke 2/2/22  Medication Adherence/Access:  She used to have home care through [email or web address] but was discharged.  More recently her friend (an RN) has been setting up her medicines for her. Certain oral medications goes right through her -- has 9 inches of her colon. Would like that considered for her medications. She is using [facility] at [facility] for some of her  medicines.  Prefers capsules.  She is working on prescription assistance programs and getting extra help through Medicare.    Hyperlipidemia/PAD: Currently taking Praluent 75 mg every 14 days.  No longer on rosuvastatin 40 mg daily due to elevated CK.  She is now meeting with rheumatology since her CK level has not improved without rosuvastatin for a workup.   She was approved for the prescription assistance program for Praluent.  Last CK level = 539 on 2/21/22    Hypertension: Current regimen: carvedilol 3.125 mg TWICE DAILY (hold if BP <100/60). No longer on lisinopril 20 mg daily due to hypotension. On 1/24/22 at nephrology patient was hypotensive and they decreased carvedilol from 12.5 mg BID. Continued to have hypotension and on 1/31/22 carvedilol decreased further to 3.125 mg and lisinopril decreased from 40 mg. Lisinopril stopped on 2/5/22. Reports home /82 pulse 86, 136/85 pulse 73, 139/84 pulse 90, 137/73 pulse 77, 134/74 pulse 73, 139/75 pulse 66. Does still feel lightheaded, but fatigue and no energy which has not improved. She feels her pulse racing in the morning, which does not make her feel good.   BP Readings from Last 3 Encounters:   02/22/22 131/69   02/21/22 118/72   02/18/22 125/69       Edema: Currently taking torsemide 10 mg daily. Reports dose was increased from 5 mg on Friday by nephrology. Nephrology previously replaced Lasix with torsemide 20 mg on 1/24/22, due to possible greater benefit and longer acting than Lasix. Met with Dr. Salgado on 1/31/22 and changed back to furosemide due to cramping at night from torsemide. She does feel swollen and her legs are huge. She did start magnesium 250 mg for the cramps.     Chronic pain: Follows with the pain clinic. Continues fentanyl 25 mcg + 12 mcg every 72 hours (decreased from 50 mcg patch every 48 hours).  Was recommended to take this for at least a month then lower to 25 mcg.  She would like to be off the fentanyl patch completely.    No  longer using ketamine.   Patient has Narcan at home  She is now also following with Dr. Ramirez.     Depression/anxiety: Currently taking venlafaxine XR 37.5 mg daily and Adderall 20 mg twice daily.  On 2/9/2022 her adderall dose was increased to 20 mg twice daily (AM and noon). Adderall switched from methylphenidate due to feeling like it worked better than methylphenidate  in the past.  She has not noticed a huge difference so far.  On 2/8/2022 she met with Dr. Rees and was started on venlafaxine XR 37.5 mg daily.  So far it is going ok. She is having more increased pain with coming off the fentanyl. Would like a dose increase since she gets anxious. Somewhat panic attacks.  Still depressed but she is talking with a therapist.       Today's Vitals: LMP  (LMP Unknown)   ----------------    Allergies/ADRs: Reviewed in chart  Past Medical History: Reviewed in chart  Tobacco: She reports that she quit smoking about 21 years ago. She has a 20.00 pack-year smoking history. She has never used smokeless tobacco.  Alcohol: Reviewed in chart    I spent 32 minutes with this patient today. All changes were made via collaborative practice agreement with Caitlyn Rees MD. A copy of the visit note was provided to the patient's primary care provider.    The patient declined a summary of these recommendations.     Darlin Elise, Pharm.D., [facility]   Medication Therapy Management Pharmacist   [facility]     Telemedicine Visit Details  Type of service:  Telephone visit  Start Time: 11:36 AM  End Time: 12:08 PM  Originating Location (patient location): Home  Distant Location (provider location):  [facility]     Medication Therapy Recommendations  Hypertension    Current Medication: carvedilol (COREG) 3.125 MG tablet (Discontinued)   Rationale: Dose too low - Dosage too low - Effectiveness   Recommendation: Increase Dose   Status: Accepted per CPA         Major  depression    Current Medication: venlafaxine (EFFEXOR-XR) 37.5 MG 24 hr capsule (Discontinued)   Rationale: Dose too low - Dosage too low - Effectiveness   Recommendation: Increase Dose   Status: Accepted per CPA

## 2022-03-02 ENCOUNTER — VIRTUAL VISIT (OUTPATIENT)
Dept: PHARMACY | Facility: CLINIC | Age: 82
End: 2022-03-02
Payer: COMMERCIAL

## 2022-03-02 DIAGNOSIS — N18.4 CKD (CHRONIC KIDNEY DISEASE) STAGE 4, GFR 15-29 ML/MIN (H): ICD-10-CM

## 2022-03-02 DIAGNOSIS — I15.1 HYPERTENSION SECONDARY TO OTHER RENAL DISORDERS: ICD-10-CM

## 2022-03-02 DIAGNOSIS — M17.0 PRIMARY OSTEOARTHRITIS OF BOTH KNEES: ICD-10-CM

## 2022-03-02 DIAGNOSIS — F06.4 ANXIETY DISORDER DUE TO MEDICAL CONDITION: ICD-10-CM

## 2022-03-02 DIAGNOSIS — E78.5 HYPERLIPIDEMIA LDL GOAL <70: Primary | ICD-10-CM

## 2022-03-02 PROCEDURE — 99607 MTMS BY PHARM ADDL 15 MIN: CPT | Performed by: PHARMACIST

## 2022-03-02 PROCEDURE — 99606 MTMS BY PHARM EST 15 MIN: CPT | Performed by: PHARMACIST

## 2022-03-02 RX ORDER — TORSEMIDE 10 MG/1
10 TABLET ORAL DAILY PRN
Qty: 30 TABLET | Refills: 0 | Status: SHIPPED | OUTPATIENT
Start: 2022-03-02 | End: 2022-04-27

## 2022-03-02 RX ORDER — CARVEDILOL 6.25 MG/1
6.25 TABLET ORAL 2 TIMES DAILY WITH MEALS
Qty: 60 TABLET | Refills: 2 | Status: SHIPPED | OUTPATIENT
Start: 2022-03-02 | End: 2022-04-27

## 2022-03-02 RX ORDER — VENLAFAXINE HYDROCHLORIDE 75 MG/1
75 CAPSULE, EXTENDED RELEASE ORAL DAILY
Qty: 60 CAPSULE | Refills: 0 | Status: SHIPPED | OUTPATIENT
Start: 2022-03-02 | End: 2022-05-27

## 2022-03-03 ENCOUNTER — OFFICE VISIT (OUTPATIENT)
Dept: PHYSICAL MEDICINE AND REHAB | Facility: CLINIC | Age: 82
End: 2022-03-03
Attending: PHYSICAL MEDICINE & REHABILITATION
Payer: MEDICARE

## 2022-03-03 VITALS — DIASTOLIC BLOOD PRESSURE: 65 MMHG | SYSTOLIC BLOOD PRESSURE: 133 MMHG | HEART RATE: 90 BPM

## 2022-03-03 DIAGNOSIS — M99.01 SOMATIC DYSFUNCTION OF CERVICAL REGION: ICD-10-CM

## 2022-03-03 DIAGNOSIS — M99.04 SOMATIC DYSFUNCTION OF SACROILIAC JOINT: ICD-10-CM

## 2022-03-03 DIAGNOSIS — M25.562 ACUTE PAIN OF LEFT KNEE: Primary | ICD-10-CM

## 2022-03-03 DIAGNOSIS — M99.02 SOMATIC DYSFUNCTION OF THORACIC REGION: ICD-10-CM

## 2022-03-03 DIAGNOSIS — M99.00 SOMATIC DYSFUNCTION OF HEAD REGION: ICD-10-CM

## 2022-03-03 DIAGNOSIS — M99.03 SOMATIC DYSFUNCTION OF LUMBAR REGION: ICD-10-CM

## 2022-03-03 DIAGNOSIS — M99.08 SOMATIC DYSFUNCTION OF RIB REGION: ICD-10-CM

## 2022-03-03 DIAGNOSIS — M99.05 SOMATIC DYSFUNCTION OF PELVIS REGION: ICD-10-CM

## 2022-03-03 DIAGNOSIS — M99.07 SOMATIC DYSFUNCTION OF UPPER EXTREMITY: ICD-10-CM

## 2022-03-03 DIAGNOSIS — M79.18 MYOFASCIAL PAIN: ICD-10-CM

## 2022-03-03 DIAGNOSIS — M99.06 SOMATIC DYSFUNCTION OF LOWER EXTREMITY: ICD-10-CM

## 2022-03-03 PROCEDURE — 98929 OSTEOPATH MANJ 9-10 REGIONS: CPT | Performed by: PHYSICAL MEDICINE & REHABILITATION

## 2022-03-03 PROCEDURE — 99213 OFFICE O/P EST LOW 20 MIN: CPT | Mod: 25 | Performed by: PHYSICAL MEDICINE & REHABILITATION

## 2022-03-03 ASSESSMENT — PAIN SCALES - GENERAL: PAINLEVEL: SEVERE PAIN (6)
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
## 2022-03-04 ENCOUNTER — LAB (OUTPATIENT)
Dept: LAB | Facility: CLINIC | Age: 82
End: 2022-03-04
Payer: MEDICARE

## 2022-03-04 DIAGNOSIS — R74.8 ELEVATED CK: ICD-10-CM

## 2022-03-04 DIAGNOSIS — Z20.822 ENCOUNTER FOR LABORATORY TESTING FOR COVID-19 VIRUS: ICD-10-CM

## 2022-03-04 DIAGNOSIS — I15.1 HYPERTENSION SECONDARY TO OTHER RENAL DISORDERS: ICD-10-CM

## 2022-03-04 LAB
ALBUMIN SERPL-MCNC: 3.8 G/DL (ref 3.5–5)
ALT SERPL W P-5'-P-CCNC: <9 U/L (ref 0–45)
C REACTIVE PROTEIN LHE: 0.2 MG/DL (ref 0–0.8)
CK SERPL-CCNC: 135 U/L (ref 30–190)
CREAT SERPL-MCNC: 1.49 MG/DL (ref 0.6–1.1)
ERYTHROCYTE [SEDIMENTATION RATE] IN BLOOD BY WESTERGREN METHOD: 14 MM/HR (ref 0–20)
FERRITIN SERPL-MCNC: 230 NG/ML (ref 10–130)
GFR SERPL CREATININE-BSD FRML MDRD: 35 ML/MIN/1.73M2
MAGNESIUM SERPL-MCNC: 2.2 MG/DL (ref 1.8–2.6)
POTASSIUM BLD-SCNC: 4.4 MMOL/L (ref 3.5–5)
SODIUM SERPL-SCNC: 132 MMOL/L (ref 136–145)

## 2022-03-04 PROCEDURE — 82565 ASSAY OF CREATININE: CPT

## 2022-03-04 PROCEDURE — 82040 ASSAY OF SERUM ALBUMIN: CPT

## 2022-03-04 PROCEDURE — 36415 COLL VENOUS BLD VENIPUNCTURE: CPT

## 2022-03-04 PROCEDURE — 99000 SPECIMEN HANDLING OFFICE-LAB: CPT

## 2022-03-04 PROCEDURE — 86235 NUCLEAR ANTIGEN ANTIBODY: CPT | Mod: 90

## 2022-03-04 PROCEDURE — 86036 ANCA SCREEN EACH ANTIBODY: CPT

## 2022-03-04 PROCEDURE — U0005 INFEC AGEN DETEC AMPLI PROBE: HCPCS

## 2022-03-04 PROCEDURE — 82550 ASSAY OF CK (CPK): CPT

## 2022-03-04 PROCEDURE — 86140 C-REACTIVE PROTEIN: CPT

## 2022-03-04 PROCEDURE — 82728 ASSAY OF FERRITIN: CPT

## 2022-03-04 PROCEDURE — 83735 ASSAY OF MAGNESIUM: CPT

## 2022-03-04 PROCEDURE — 84295 ASSAY OF SERUM SODIUM: CPT

## 2022-03-04 PROCEDURE — 84460 ALANINE AMINO (ALT) (SGPT): CPT

## 2022-03-04 PROCEDURE — 86256 FLUORESCENT ANTIBODY TITER: CPT

## 2022-03-04 PROCEDURE — 83516 IMMUNOASSAY NONANTIBODY: CPT | Mod: 90

## 2022-03-04 PROCEDURE — 84132 ASSAY OF SERUM POTASSIUM: CPT

## 2022-03-04 PROCEDURE — 85652 RBC SED RATE AUTOMATED: CPT

## 2022-03-04 PROCEDURE — U0003 INFECTIOUS AGENT DETECTION BY NUCLEIC ACID (DNA OR RNA); SEVERE ACUTE RESPIRATORY SYNDROME CORONAVIRUS 2 (SARS-COV-2) (CORONAVIRUS DISEASE [COVID-19]), AMPLIFIED PROBE TECHNIQUE, MAKING USE OF HIGH THROUGHPUT TECHNOLOGIES AS DESCRIBED BY CMS-2020-01-R: HCPCS

## 2022-03-04 PROCEDURE — 86038 ANTINUCLEAR ANTIBODIES: CPT

## 2022-03-05 LAB — SARS-COV-2 RNA RESP QL NAA+PROBE: NEGATIVE

## 2022-03-07 LAB
ANA SER QL IF: NEGATIVE
ANCA AB PATTERN SER IF-IMP: NORMAL
C-ANCA TITR SER IF: NORMAL {TITER}

## 2022-03-09 ENCOUNTER — OFFICE VISIT (OUTPATIENT)
Dept: RHEUMATOLOGY | Facility: CLINIC | Age: 82
End: 2022-03-09
Payer: MEDICARE

## 2022-03-09 VITALS
HEIGHT: 63 IN | DIASTOLIC BLOOD PRESSURE: 66 MMHG | HEART RATE: 84 BPM | WEIGHT: 154.7 LBS | BODY MASS INDEX: 27.41 KG/M2 | SYSTOLIC BLOOD PRESSURE: 122 MMHG

## 2022-03-09 DIAGNOSIS — M17.0 PRIMARY OSTEOARTHRITIS OF BOTH KNEES: Primary | ICD-10-CM

## 2022-03-09 DIAGNOSIS — N18.4 CKD (CHRONIC KIDNEY DISEASE) STAGE 4, GFR 15-29 ML/MIN (H): ICD-10-CM

## 2022-03-09 DIAGNOSIS — M25.50 POLYARTHRALGIA: ICD-10-CM

## 2022-03-09 PROCEDURE — 99214 OFFICE O/P EST MOD 30 MIN: CPT | Performed by: INTERNAL MEDICINE
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
## 2022-03-10 DIAGNOSIS — M54.16 LUMBAR RADICULOPATHY: ICD-10-CM

## 2022-03-10 RX ORDER — DEXTROAMPHETAMINE SACCHARATE, AMPHETAMINE ASPARTATE, DEXTROAMPHETAMINE SULFATE AND AMPHETAMINE SULFATE 5; 5; 5; 5 MG/1; MG/1; MG/1; MG/1
20 TABLET ORAL 2 TIMES DAILY
Qty: 60 TABLET | Refills: 0 | Status: SHIPPED | OUTPATIENT
Start: 2022-03-10 | End: 2022-04-15

## 2022-03-10 NOTE — TELEPHONE ENCOUNTER
Pending Prescriptions:                       Disp   Refills    amphetamine-dextroamphetamine (ADDERALL) *60 tab*0            Sig: Take 1 tablet (20 mg) by mouth 2 times daily    [facility]

## 2022-03-10 NOTE — TELEPHONE ENCOUNTER
Received call from patient requesting refill(s) of:  AMPHETAMINE-DEXTROAMPHETAMINE (ADDERALL)      Last dispensed from pharmacy on :   2/9/2022    Patient's last office/virtual visit by prescribing provider on :   02/09/2022    Next office/virtual appointment scheduled for:    05/04/2022    Last urine drug screen date :  04/23/2021    Current opioid agreement on file (completed within the last year) Yes Date of opioid agreement:   05/06/2021    E-prescribe to   [address] [place] [address]   pharmacy    Will route to [facility] for review and preparation of prescription(s).     BETH Evangelista (AAMA)............. March 10, 2022  3:11 PM

## 2022-03-11 ENCOUNTER — TELEPHONE (OUTPATIENT)
Dept: PALLIATIVE MEDICINE | Facility: OTHER | Age: 82
End: 2022-03-11
Payer: MEDICARE

## 2022-03-11 NOTE — TELEPHONE ENCOUNTER
PA request for this medication:  amphetamine-dextroamphetamine (ADDERALL) 20 MG tablet 60 tablet 0 3/10/2022  No   Sig - Route: Take 1 tablet (20 mg) by mouth 2 times daily - Oral   Sent to pharmacy as: Amphetamine-Dextroamphetamine 20 MG Oral Tablet (ADDERALL)   Class: E-Prescribe   Earliest Fill Date: 3/10/2022   Order: [id number]   E-Prescribing Status: Receipt confirmed by pharmacy (3/10/2022  3:53 PM CST)     [facility] [place]

## 2022-03-15 LAB
ANA SER QL: NEGATIVE
ANNOTATION COMMENT IMP: NORMAL
EJ AB SER QL: NEGATIVE
ENA JO1 IGG SER-ACNC: 1 AU/ML
MDA5 AB SER QL LINE BLOT: NEGATIVE
MI2 AB SER QL: NEGATIVE
MJ AB SER QL LINE BLOT: NEGATIVE
OJ AB SER QL: NEGATIVE
PL12 AB SER QL: NEGATIVE
PL7 AB SER QL: NEGATIVE
SAE1 AB SER QL LINE BLOT: NEGATIVE
SRP AB SERPL QL: NEGATIVE
TIF1-GAMMA AB SER QL LINE BLOT: NEGATIVE

## 2022-03-16 ENCOUNTER — OFFICE VISIT (OUTPATIENT)
Dept: FAMILY MEDICINE | Facility: CLINIC | Age: 82
End: 2022-03-16
Payer: MEDICARE

## 2022-03-16 VITALS
HEART RATE: 63 BPM | BODY MASS INDEX: 27.99 KG/M2 | DIASTOLIC BLOOD PRESSURE: 64 MMHG | WEIGHT: 158 LBS | SYSTOLIC BLOOD PRESSURE: 118 MMHG | OXYGEN SATURATION: 96 %

## 2022-03-16 DIAGNOSIS — E03.9 ACQUIRED HYPOTHYROIDISM: ICD-10-CM

## 2022-03-16 DIAGNOSIS — R94.31 ABNORMAL ELECTROCARDIOGRAM: Primary | ICD-10-CM

## 2022-03-16 DIAGNOSIS — T14.8XXA NONHEALING NONSURGICAL WOUND: ICD-10-CM

## 2022-03-16 DIAGNOSIS — G47.00 INSOMNIA, UNSPECIFIED TYPE: ICD-10-CM

## 2022-03-16 PROCEDURE — 99214 OFFICE O/P EST MOD 30 MIN: CPT | Performed by: FAMILY MEDICINE

## 2022-03-16 RX ORDER — LEVOTHYROXINE SODIUM 50 UG/1
50 TABLET ORAL DAILY
Qty: 90 TABLET | Refills: 3 | Status: SHIPPED | OUTPATIENT
Start: 2022-03-16 | End: 2023-11-06

## 2022-03-16 RX ORDER — CLONIDINE HYDROCHLORIDE 0.1 MG/1
0.1 TABLET ORAL AT BEDTIME
Qty: 30 TABLET | Refills: 1 | Status: SHIPPED | OUTPATIENT
Start: 2022-03-16 | End: 2022-04-20

## 2022-03-16 NOTE — PROGRESS NOTES
Assessment & Plan     Abnormal electrocardiogram  -h/o vascular disease, will refer for echocardiogram for evaluation for possible wall motion abnormalities  - Echocardiogram Complete    Acquired hypothyroidism  -updating prescription, today, labs up to date  - levothyroxine (SYNTHROID/LEVOTHROID) 50 MCG tablet  Dispense: 90 tablet; Refill: 3    Insomnia, unspecified type  -as she is worried about her issues coming somewhat from her fentanyl dose decrease as well as her ADHD, will have her try some clonidine before bed to see if this will help her symptoms  - cloNIDine (CATAPRES) 0.1 MG tablet  Dispense: 30 tablet; Refill: 1    Nonhealing nonsurgical wound  -cauterized again today, continue to avoid trauma to the area.       {Provider  Link to [facility] Help Grid :[id number]}         Return in about 6 weeks (around 4/27/2022) for Follow up.    Caitlyn Rees MD  [facility]    Alicia Delgado is a 81 year old who presents for the following health issues     HPI     EKG showed a possible septal infarct when she was in last time. She does not remember having severe chest pain. She does at times have pain in her head though.    She did have her crestor discontinued in the ER and has started on praluent.     She is nearly off the patch which she is quite excited about. She does have one more dose decrease. She is not sleeping well. She did talk with the pharmacist and she is taking two of the hydroxyzine and two of the trazodone and she is still not sleeping. She does continue to struggle with this. She has been waking up with headaches. She is not getting any more Botox due to the cost. She does feel twitchy, wondering if some of this is related to withdrawal.    She does continue with PT as well, given the cost, they are worried that it is going to stop getting covered. She does find that PT has helped, swelling was improving, her kidney function was better. She is seeing Dr. Ramirez as  well. She does take tylenol before bedtime.     She does continue to have oozing from her head wound, it is better since cautery but still oozing.       Review of Systems   Per above      Objective    /64 (BP Location: Left arm, Patient Position: Sitting, Cuff Size: Adult Regular)   Pulse 63   Wt 71.7 kg (158 lb)   LMP  (LMP Unknown)   SpO2 96%   Breastfeeding No   BMI 27.99 kg/m    Body mass index is 27.99 kg/m .  Physical Exam   GENERAL: healthy, alert and no distress  NECK: no adenopathy, no asymmetry, masses, or scars and thyroid normal to palpation  RESP: lungs clear to auscultation - no rales, rhonchi or wheezes  CV: regular rate and rhythm, normal S1 S2, no S3 or S4, no murmur, click or rub, no peripheral edema and peripheral pulses strong  MS: no gross musculoskeletal defects noted, no edema  SKIN: 1 cm diameter wound on right crown of the head, shallower than prior but scabbed and oozing. Cauterized with silver nitrate

## 2022-03-16 NOTE — TELEPHONE ENCOUNTER
Prior Authorization Approval    Authorization Effective Date: 2/14/2022  Authorization Expiration Date: 3/16/2099  Medication: amphetamine-dextroamphetamine (ADDERALL) 20 MG   Approved Dose/Quantity:   Reference #:     Insurance Company: [employer] - Phone [phone] Fax [phone]  Expected CoPay:       CoPay Card Available:      Foundation Assistance Needed:    Which Pharmacy is filling the prescription (Not needed for infusion/clinic administered): [facility] [place] [address]  Pharmacy Notified: Yes  Patient Notified: Yes

## 2022-03-16 NOTE — TELEPHONE ENCOUNTER
Central Prior Authorization Team   Phone: [phone]    PA Initiation    Medication: amphetamine-dextroamphetamine (ADDERALL) 20 MG   Insurance Company: [employer] - Phone [phone] Fax [phone]  Pharmacy Filling the Rx: [address] [place] [address]  Filling Pharmacy Phone: [phone]  Filling Pharmacy Fax:    Start Date: 3/16/2022

## 2022-03-17 ENCOUNTER — OFFICE VISIT (OUTPATIENT)
Dept: PHYSICAL MEDICINE AND REHAB | Facility: CLINIC | Age: 82
End: 2022-03-17
Attending: PHYSICAL MEDICINE & REHABILITATION
Payer: MEDICARE

## 2022-03-17 VITALS
WEIGHT: 156 LBS | SYSTOLIC BLOOD PRESSURE: 120 MMHG | DIASTOLIC BLOOD PRESSURE: 58 MMHG | BODY MASS INDEX: 27.63 KG/M2 | HEART RATE: 80 BPM

## 2022-03-17 DIAGNOSIS — M79.18 MYOFASCIAL PAIN: Primary | ICD-10-CM

## 2022-03-17 DIAGNOSIS — M99.05 SOMATIC DYSFUNCTION OF PELVIS REGION: ICD-10-CM

## 2022-03-17 DIAGNOSIS — M99.08 SOMATIC DYSFUNCTION OF RIB REGION: ICD-10-CM

## 2022-03-17 DIAGNOSIS — M99.01 SOMATIC DYSFUNCTION OF CERVICAL REGION: ICD-10-CM

## 2022-03-17 DIAGNOSIS — M99.03 SOMATIC DYSFUNCTION OF LUMBAR REGION: ICD-10-CM

## 2022-03-17 DIAGNOSIS — M99.06 SOMATIC DYSFUNCTION OF LOWER EXTREMITY: ICD-10-CM

## 2022-03-17 DIAGNOSIS — M99.04 SOMATIC DYSFUNCTION OF SACROILIAC JOINT: ICD-10-CM

## 2022-03-17 DIAGNOSIS — M99.02 SOMATIC DYSFUNCTION OF THORACIC REGION: ICD-10-CM

## 2022-03-17 DIAGNOSIS — M99.07 SOMATIC DYSFUNCTION OF UPPER EXTREMITY: ICD-10-CM

## 2022-03-17 DIAGNOSIS — M99.00 SOMATIC DYSFUNCTION OF HEAD REGION: ICD-10-CM

## 2022-03-17 PROCEDURE — 98929 OSTEOPATH MANJ 9-10 REGIONS: CPT | Performed by: PHYSICAL MEDICINE & REHABILITATION

## 2022-03-17 ASSESSMENT — PAIN SCALES - GENERAL: PAINLEVEL: MODERATE PAIN (4)
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
## 2022-03-21 ENCOUNTER — VIRTUAL VISIT (OUTPATIENT)
Dept: NEUROLOGY | Facility: CLINIC | Age: 82
End: 2022-03-21
Payer: MEDICARE

## 2022-03-21 DIAGNOSIS — F43.23 ADJUSTMENT DISORDER WITH MIXED ANXIETY AND DEPRESSED MOOD: Primary | ICD-10-CM

## 2022-03-21 PROCEDURE — 90834 PSYTX W PT 45 MINUTES: CPT | Mod: 95 | Performed by: PSYCHOLOGIST

## 2022-03-21 NOTE — LETTER
3/21/2022         RE: Sandy Spencer  [address]  [facility] [address]        Dear Colleague,    Thank you for referring your patient, Sandy Spencer, to the [facility]. Please see a copy of my visit note below.    Psychology Progress Note    Date: March 21, 2022    Time length and type of treatment: 46 minutes (3:00 PM - 3:46 PM), individual therapy    After review of the patient's situation, this visit was changed from an in-person visit to a video visit via cocone to reduce the risk of COVID 19 exposure. Patient was informed that policies and procedures that govern in-person sessions would also apply to video sessions. Patient was also informed that video sessions would be discontinued when COVID 19 exposure is no longer a concern (as determined by [facility]).     Patient location: Patient home in [place], [place]  Provider location:  [facility] - Provider remote location    Patient was in agreement with proceeding with a video session.      Necessity: This session is necessary to revise the patient's treatment plan. The reader is invited to review the patient's full treatment plan in the Media section of the patient's Epic medical record.    Psychotherapeutic Technique: This writer utilized motivational interviewing, active listening, reassurance and support in the context of cognitive behavioral therapy to address the above.      MENTAL STATUS EVALUATION  Grooming: Within normal limits  Attire: Appropriate  Age: Appears Stated  Behavior Towards Examiner: Cooperative  Motor Activity: Within normal limits  Eye Contact: Appropriate  Mood: Sad   Affect: full range  Speech/Language: normal  Attention: Normal  Concentration: Sufficient  Thought Process: tangential  Thought Content: Clear    Orientation: Appeared oriented to person, place, and time, though not formally established  Memory: No evidence of impairment.  Judgement:  Adequate  Estimated Intelligence: Average  Demonstrated Insight: Adequate  Fund of Knowledge: Adequate    Intervention:   Patient was readministered the PHQ-9, KT-7, and PCL-5 as part of revising her treatment plan.  Her score of 15 on the PCL-5 was below the cut score of 32 or 33 recommended for consideration of PTSD and patient affirmed that this felt accurate despite being distressed over a grandson's intentionally taking pills and being psychiatrically hospitalized.  The role her ex-'s death played in her decision to forgive him and move on was briefly explored.  Based on the PCL-5 and patient report, a diagnosis of PTSD will be removed.  Patient's score of 7 on the KT-7 and 8 on the PCL-5 remains suggestive of mild anxiety and depressed mood.  Patient believes that her continuing to taper off fentanyl is a contributor especially to her anxiety and sleep disturbance, and we agreed to continue meeting once every three weeks while she continues this taper.       Progress:  Patient treatment plan was jointly revised.    Plan:   We will meet again in 3 weeks to address the patient's anxiety and depressed mood.  Estimated duration of treatment is 10+ individual therapy sessions (29828) at intervals of once every three weeks. Treatment is expected to be completed by September 2022.     Diagnosis:  Adjustment Disorder with mixed anxiety and depressed mood  Attention-Deficit/Hyperactivity Disorder, combined presentation      Again, thank you for allowing me to participate in the care of your patient.        Sincerely,        Deann Meeks Psy.D, LP

## 2022-03-21 NOTE — PROGRESS NOTES
Psychology Progress Note    Date: March 21, 2022    Time length and type of treatment: 46 minutes (3:00 PM - 3:46 PM), individual therapy    After review of the patient's situation, this visit was changed from an in-person visit to a video visit via Socialeyes App to reduce the risk of COVID 19 exposure. Patient was informed that policies and procedures that govern in-person sessions would also apply to video sessions. Patient was also informed that video sessions would be discontinued when COVID 19 exposure is no longer a concern (as determined by [facility]).     Patient location: Patient home in [place], [place]  Provider location:  [facility] - Provider remote location    Patient was in agreement with proceeding with a video session.      Necessity: This session is necessary to revise the patient's treatment plan. The reader is invited to review the patient's full treatment plan in the Media section of the patient's Epic medical record.    Psychotherapeutic Technique: This writer utilized motivational interviewing, active listening, reassurance and support in the context of cognitive behavioral therapy to address the above.      MENTAL STATUS EVALUATION  Grooming: Within normal limits  Attire: Appropriate  Age: Appears Stated  Behavior Towards Examiner: Cooperative  Motor Activity: Within normal limits  Eye Contact: Appropriate  Mood: Sad   Affect: full range  Speech/Language: normal  Attention: Normal  Concentration: Sufficient  Thought Process: tangential  Thought Content: Clear    Orientation: Appeared oriented to person, place, and time, though not formally established  Memory: No evidence of impairment.  Judgement: Adequate  Estimated Intelligence: Average  Demonstrated Insight: Adequate  Fund of Knowledge: Adequate    Intervention:   Patient was readministered the PHQ-9, KT-7, and PCL-5 as part of revising her treatment plan.  Her score of 15 on the PCL-5 was below the cut score  of 32 or 33 recommended for consideration of PTSD and patient affirmed that this felt accurate despite being distressed over a grandson's intentionally taking pills and being psychiatrically hospitalized.  The role her ex-'s death played in her decision to forgive him and move on was briefly explored.  Based on the PCL-5 and patient report, a diagnosis of PTSD will be removed.  Patient's score of 7 on the KT-7 and 8 on the PCL-5 remains suggestive of mild anxiety and depressed mood.  Patient believes that her continuing to taper off fentanyl is a contributor especially to her anxiety and sleep disturbance, and we agreed to continue meeting once every three weeks while she continues this taper.       Progress:  Patient treatment plan was jointly revised.    Plan:   We will meet again in 3 weeks to address the patient's anxiety and depressed mood.  Estimated duration of treatment is 10+ individual therapy sessions (64378) at intervals of once every three weeks. Treatment is expected to be completed by September 2022.     Diagnosis:  Adjustment Disorder with mixed anxiety and depressed mood  Attention-Deficit/Hyperactivity Disorder, combined presentation

## 2022-03-22 NOTE — PROGRESS NOTES
Medication Therapy Management (MTM) Encounter    ASSESSMENT:                            Hyperlipidemia/PAD: Patient to continue Praluent, ok to remain off rosuvastatin due to CK issues which have now resolved. Will recheck her lipids with future appointment with Dr. Rees and re-evaluate whether to restart statin at a lower dose.     Hypertension: BP has been at goal <130/80 mmHg due to CKD. Continue carvedilol 6.25 mg twice daily, and we reviewed to hold if BP <100/60 mmHg. Her BP has been a bit lower at home, possibly due to clonidine therefore will monitor when off at bedtime.Remain off lisinopril for now unless instructed by nephrology.      Edema: Continue current regimen.     Chronic pain: Continue to follow with the pain clinic. Reviewed ok to use clonidine PRN for withdrawal symptoms including agitation. Will also see if lidocaine 5% patch will be covered by her insurance. Otherwise she can use goodrx or purchase OTC.     Depression/anxiety: Continue current venlafaxine and Adderall doses. Reviewed her [employer] results. At future follow up can consider switching her to Pristiq due to her metabolism.     Insomnia: Does not appear that clonidine has been helpful at bedtime, therefore will have her use PRN withdrawal/agitation (see above). She will increase the trazodone back to 3 tablets HS, ok to continue hydroxyzine. Will see if that helps her sleep a bit longer.       PLAN:                            1.  Stop clonidine HS and use PRN withdrawal/agitation   2.  Increase trazodone to 300 mg HS  3.  Rx sent for lidocaine 5% patch  4.  Future lipids     Follow-up: 4-week phone follow-up     SUBJECTIVE/OBJECTIVE:                          Sandy Spencer is a 81 year old female called for a follow up visit. She was referred from Dr. Rees for polypharmacy.     Reason for visit: Follow up since we last spoke 3/2/22  Medication Adherence/Access:  She used to have home care through [facility] but was discharged.   More recently her friend (an RN) has been setting up her medicines for her. Certain oral medications goes right through her -- has 9 inches of her colon. Would like that considered for her medications. She is using [facility] for some of her medicines.  Prefers capsules.  She gets Praluent and Elquis through prescription assistance programs and getting extra help through Medicare.    Hyperlipidemia/PAD: Currently taking Praluent 75 mg every 14 days.  No longer on rosuvastatin 40 mg daily due to elevated CK.  She was meeting with rheumatology since her CK level had not improved without rosuvastatin for a workup, but it is now improved.    Since stopping rosuvastatin she did not notice any change in myalagias, hard to tell since she has chronic pain.   She was approved for the prescription assistance program for Praluent.  Last CK level = 135 on 3/4/22  Last lipids checked 9/17/21    Hypertension: Continues carvedilol 6.25 mg TWICE DAILY (hold if BP <100/60). She has not held yet. We increased carvedilol from 3.125 mg twice daily on 3/2/22. On 1/24/22 at nephrology patient was hypotensive and they decreased carvedilol from 12.5 mg BID. Continued to have hypotension and on 1/31/22 carvedilol decreased further to 3.125 mg and lisinopril decreased from 40 mg. Lisinopril stopped on 2/5/22 due to hypotension. Reports home /65, pulse 62. Yesterday felt her BP drop to 92/56 pulse 69. The heart thumping has gotten better since the carvedilol dose was increased. ECHO on Friday.   BP Readings from Last 3 Encounters:   03/17/22 120/58   03/16/22 118/64   03/09/22 122/66       Edema: Currently taking torsemide 10 mg daily. Reports dose was increased from 5 mg on Friday by nephrology. Nephrology previously replaced Lasix with torsemide 20 mg on 1/24/22, due to possible greater benefit and longer acting than Lasix. Met with Dr. Salgado on 1/31/22 and changed back to furosemide due to cramping at night from torsemide.  She does feel swollen and her legs are huge. She did start magnesium 250 mg for the cramps.  Last mag level = 2.2 on 3/4/22     Chronic pain: Follows with the pain and spine clinic. Continues fentanyl 12 mcg every 72 hours.  She stopped the 25 mcg patch. She would like to be off the fentanyl patch completely.  Does feel some agitation with the lower dose.   No longer using ketamine, but still has at home   Hx of gabapentin (memory and weight gain) and Lyrica (throat closes)  Wonders about lidocaine patch -- was helpful for her in the past but expensive  Patient has Narcan at home  She is now also following with Dr. Ramirez.     Depression/anxiety: Currently taking venlafaxine XR 75 mg daily and Adderall 20 mg twice daily.  Venlafaxine dose increased from 37.5 mg on 3/2/22. She has not noticed a big difference. 37.5 mg started on 2/8/22.   On 2/9/2022 her adderall dose was increased to 20 mg twice daily (AM and noon). Adderall switched from methylphenidate due to feeling like it worked better than methylphenidate  in the past.    Continues to have some anxiety, feels depression is better.   She is meeting with a therapist.   Jan 2020 [employer] done     Insomnia: Met with PCP on 3/16/2022 and was given clonidine 0.1 mg to use at bedtime.  Continues trazodone 200 mg nightly and hydroxyzine 50 mg nightly and was still not sleeping. She started the clonidine - brain will not shut down. Does not think it has done anything. Does not dream at all, does not get in a deep sleep. Thinks a lot has to do with pain.       Today's Vitals: LMP  (LMP Unknown)   ----------------    Allergies/ADRs: Reviewed in chart  Past Medical History: Reviewed in chart  Tobacco: She reports that she quit smoking about 21 years ago. She has a 20.00 pack-year smoking history. She has never used smokeless tobacco.  Alcohol: Reviewed in chart    I spent 37 minutes with this patient today. All changes were made via collaborative practice agreement with  Caitlyn Rees MD. A copy of the visit note was provided to the patient's primary care provider.    The patient declined a summary of these recommendations.     Darlin Elise, Pharm.D., [facility]   Medication Therapy Management Pharmacist   [facility]     Telemedicine Visit Details  Type of service:  Telephone visit  Start Time: 11:35 AM  End Time: 12:12 PM  Originating Location (patient location): Home  Distant Location (provider location):  [facility]     Medication Therapy Recommendations  Chronic pain syndrome    Current Medication: lidocaine (LIDODERM) 5 % patch   Rationale: Synergistic therapy - Needs additional medication therapy - Indication   Recommendation: Start Medication   Status: Accepted per CPA         Insomnia    Current Medication: cloNIDine (CATAPRES) 0.1 MG tablet   Rationale: Condition refractory to medication - Ineffective medication - Effectiveness   Recommendation: Discontinue Medication   Status: Accepted per CPA          Current Medication: traZODone (DESYREL) 100 MG tablet   Rationale: Dose too low - Dosage too low - Effectiveness   Recommendation: Increase Dose   Status: Accepted per CPA

## 2022-03-23 ENCOUNTER — VIRTUAL VISIT (OUTPATIENT)
Dept: PHARMACY | Facility: CLINIC | Age: 82
End: 2022-03-23
Payer: COMMERCIAL

## 2022-03-23 DIAGNOSIS — M79.18 MYOFASCIAL PAIN: ICD-10-CM

## 2022-03-23 DIAGNOSIS — E78.5 HYPERLIPIDEMIA LDL GOAL <70: Primary | ICD-10-CM

## 2022-03-23 DIAGNOSIS — R60.1 GENERALIZED EDEMA: ICD-10-CM

## 2022-03-23 DIAGNOSIS — I15.1 HYPERTENSION SECONDARY TO OTHER RENAL DISORDERS: ICD-10-CM

## 2022-03-23 DIAGNOSIS — F06.4 ANXIETY DISORDER DUE TO MEDICAL CONDITION: ICD-10-CM

## 2022-03-23 DIAGNOSIS — G47.00 INSOMNIA, UNSPECIFIED TYPE: ICD-10-CM

## 2022-03-23 PROCEDURE — 99607 MTMS BY PHARM ADDL 15 MIN: CPT | Performed by: PHARMACIST

## 2022-03-23 PROCEDURE — 99606 MTMS BY PHARM EST 15 MIN: CPT | Performed by: PHARMACIST

## 2022-03-23 RX ORDER — LIDOCAINE 50 MG/G
1 PATCH TOPICAL EVERY 24 HOURS
Qty: 30 PATCH | Refills: 0 | Status: SHIPPED | OUTPATIENT
Start: 2022-03-23 | End: 2022-09-21

## 2022-03-24 ENCOUNTER — TELEPHONE (OUTPATIENT)
Dept: FAMILY MEDICINE | Facility: CLINIC | Age: 82
End: 2022-03-24
Payer: MEDICARE

## 2022-03-24 NOTE — TELEPHONE ENCOUNTER
The name of the report is called [employer], which is pharmacogenomic testing. If you look in Media, it is in there with the date of 1/17/2020.     Called her to leave VM. Please give her message above if she calls back.

## 2022-03-24 NOTE — TELEPHONE ENCOUNTER
Prior Authorization Request  Who s requesting:  [employer]  Pharmacy Name and Location: [facility]  Medication Name: LIDOCAINE 5% PATCHES  Insurance Plan: MEDICARE,[facility]  Insurance Member ID Number:  [id number]  CoverMyMeds Key: NA   Informed patient that prior authorizations can take up to 10 business days for response:   NA  Okay to leave a detailed message: NO

## 2022-03-24 NOTE — TELEPHONE ENCOUNTER
Pt asking for the name of a lab report that was completed 4/2020 that was 4 pages long. She needs this for her neurologist. Patient reports that is was found ad discussed with Darlin yesterday during their appointment.      Please call patient with the name of this lab report. [phone]  Ok to leave voicemail message

## 2022-03-25 ENCOUNTER — TELEPHONE (OUTPATIENT)
Dept: PHARMACY | Facility: CLINIC | Age: 82
End: 2022-03-25
Payer: MEDICARE

## 2022-03-25 NOTE — TELEPHONE ENCOUNTER
Pt calling today wanting to leave a message to Darlin.     Pt states that Darlin is aware of pt's request- needs a copy of the report of the testing talked about to be sent over to [facility] in [place] at F: [phone].    Pt would like a copy mailed to pt's home.

## 2022-03-28 NOTE — TELEPHONE ENCOUNTER
Prior Authorization Approval    Authorization Effective Date: 2/26/2022  Authorization Expiration Date: 3/28/2023  Medication: Lidocaine 5% patches  Approved Dose/Quantity:   Reference #:     Insurance Company: [employer] - Phone [phone] Fax [phone]  Expected CoPay:       CoPay Card Available:      Foundation Assistance Needed:    Which Pharmacy is filling the prescription (Not needed for infusion/clinic administered): [facility], [place] [address] [place] [address] - [address] [place] [address]  Pharmacy Notified: Yes  Patient Notified: Yes

## 2022-03-28 NOTE — TELEPHONE ENCOUNTER
Central Prior Authorization Team   Phone: [phone]    PA Initiation    Medication: Lidocaine 5% patches    Insurance Company: [employer] - Phone [phone] Fax [phone]  Pharmacy Filling the Rx: [facility],  - [place], [place] - [address] [place] [address]  Filling Pharmacy Phone: [phone]  Filling Pharmacy Fax:    Start Date: 3/28/2022

## 2022-03-29 ENCOUNTER — HOSPITAL ENCOUNTER (OUTPATIENT)
Dept: PHYSICAL THERAPY | Facility: REHABILITATION | Age: 82
Discharge: HOME | End: 2022-03-29
Payer: MEDICARE

## 2022-03-29 DIAGNOSIS — M79.18 MYOFASCIAL PAIN: ICD-10-CM

## 2022-03-29 DIAGNOSIS — G90.523 COMPLEX REGIONAL PAIN SYNDROME TYPE 1 OF BOTH LOWER EXTREMITIES: ICD-10-CM

## 2022-03-29 DIAGNOSIS — M54.16 LUMBAR RADICULOPATHY: Primary | ICD-10-CM

## 2022-03-29 DIAGNOSIS — G89.4 CHRONIC PAIN SYNDROME: ICD-10-CM

## 2022-03-29 PROCEDURE — 97140 MANUAL THERAPY 1/> REGIONS: CPT | Mod: GP | Performed by: PHYSICAL THERAPIST

## 2022-03-29 PROCEDURE — 97112 NEUROMUSCULAR REEDUCATION: CPT | Mod: GP | Performed by: PHYSICAL THERAPIST

## 2022-03-31 ENCOUNTER — TELEPHONE (OUTPATIENT)
Dept: FAMILY MEDICINE | Facility: CLINIC | Age: 82
End: 2022-03-31

## 2022-03-31 ENCOUNTER — HOSPITAL ENCOUNTER (OUTPATIENT)
Dept: CARDIOLOGY | Facility: CLINIC | Age: 82
Discharge: HOME | End: 2022-03-31
Attending: FAMILY MEDICINE | Admitting: FAMILY MEDICINE
Payer: MEDICARE

## 2022-03-31 DIAGNOSIS — R94.31 ABNORMAL ELECTROCARDIOGRAM: ICD-10-CM

## 2022-03-31 PROCEDURE — 93306 TTE W/DOPPLER COMPLETE: CPT

## 2022-03-31 PROCEDURE — 93306 TTE W/DOPPLER COMPLETE: CPT | Mod: 26 | Performed by: INTERNAL MEDICINE

## 2022-04-01 NOTE — TELEPHONE ENCOUNTER
Please let the patient know that there are no signs of the septal infarct on the ultrasound. Her changes are not due to the cholesterol medication either.

## 2022-04-01 NOTE — TELEPHONE ENCOUNTER
----- Message from Cristina Holman CMA sent at 3/31/2022  5:34 PM CDT -----  Routing to you, Patient had questions about her EKG that I couldn't find notes on. Please advise and I can call the patient back.     Thanks,   Cristina Holman CMA.   ----- Message -----  From: Caitlyn Rees MD  Sent: 3/31/2022   2:11 PM CDT  To: [facility] Care Team Pool    Please let the patient know that her heart ultrasound looked great without any signs of concern based on the last EKG. This is great news, things are pumping well.

## 2022-04-05 NOTE — TELEPHONE ENCOUNTER
Patient notified. She believes she had a heart attack in the past.   Dr Ware wasn't helpful with answers because her labs(CK)was back to normal at the time of her visit.       Follow up appointment scheduled with Dr Salgado 4/8/22 as patient has many complex questions and her ongoing fatigue..   Will keep her visit with Dr Rees that was already scheduled for further PCP follow up.

## 2022-04-05 NOTE — TELEPHONE ENCOUNTER
Severe dehydration can cause some muscle damage. Occasionally there are diseases that can increase the CK as well.     That's why she is seeing the rheumatologist, Dr. Ware.

## 2022-04-07 ENCOUNTER — OFFICE VISIT (OUTPATIENT)
Dept: PHYSICAL MEDICINE AND REHAB | Facility: CLINIC | Age: 82
End: 2022-04-07
Payer: MEDICARE

## 2022-04-07 VITALS
HEART RATE: 94 BPM | WEIGHT: 156 LBS | BODY MASS INDEX: 27.63 KG/M2 | DIASTOLIC BLOOD PRESSURE: 54 MMHG | SYSTOLIC BLOOD PRESSURE: 105 MMHG

## 2022-04-07 DIAGNOSIS — M99.04 SOMATIC DYSFUNCTION OF SACROILIAC JOINT: ICD-10-CM

## 2022-04-07 DIAGNOSIS — M99.01 SOMATIC DYSFUNCTION OF CERVICAL REGION: ICD-10-CM

## 2022-04-07 DIAGNOSIS — M79.18 MYOFASCIAL PAIN: Primary | ICD-10-CM

## 2022-04-07 DIAGNOSIS — M99.07 SOMATIC DYSFUNCTION OF UPPER EXTREMITY: ICD-10-CM

## 2022-04-07 DIAGNOSIS — M99.05 SOMATIC DYSFUNCTION OF PELVIS REGION: ICD-10-CM

## 2022-04-07 DIAGNOSIS — M99.02 SOMATIC DYSFUNCTION OF THORACIC REGION: ICD-10-CM

## 2022-04-07 DIAGNOSIS — M99.03 SOMATIC DYSFUNCTION OF LUMBAR REGION: ICD-10-CM

## 2022-04-07 DIAGNOSIS — M99.00 SOMATIC DYSFUNCTION OF HEAD REGION: ICD-10-CM

## 2022-04-07 DIAGNOSIS — M99.08 SOMATIC DYSFUNCTION OF RIB REGION: ICD-10-CM

## 2022-04-07 DIAGNOSIS — M99.06 SOMATIC DYSFUNCTION OF LOWER EXTREMITY: ICD-10-CM

## 2022-04-07 PROCEDURE — 98929 OSTEOPATH MANJ 9-10 REGIONS: CPT | Performed by: PHYSICAL MEDICINE & REHABILITATION

## 2022-04-07 ASSESSMENT — PAIN SCALES - GENERAL: PAINLEVEL: MODERATE PAIN (5)
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
## 2022-04-08 ENCOUNTER — OFFICE VISIT (OUTPATIENT)
Dept: FAMILY MEDICINE | Facility: CLINIC | Age: 82
End: 2022-04-08
Payer: MEDICARE

## 2022-04-08 VITALS
HEIGHT: 63 IN | TEMPERATURE: 98.5 F | OXYGEN SATURATION: 97 % | BODY MASS INDEX: 27.64 KG/M2 | WEIGHT: 156 LBS | HEART RATE: 88 BPM | RESPIRATION RATE: 14 BRPM | DIASTOLIC BLOOD PRESSURE: 72 MMHG | SYSTOLIC BLOOD PRESSURE: 130 MMHG

## 2022-04-08 DIAGNOSIS — R53.83 OTHER FATIGUE: Primary | ICD-10-CM

## 2022-04-08 DIAGNOSIS — N18.32 STAGE 3B CHRONIC KIDNEY DISEASE (H): ICD-10-CM

## 2022-04-08 DIAGNOSIS — E78.00 HYPERCHOLESTEREMIA: ICD-10-CM

## 2022-04-08 LAB
ANION GAP SERPL CALCULATED.3IONS-SCNC: 12 MMOL/L (ref 5–18)
BUN SERPL-MCNC: 33 MG/DL (ref 8–28)
CALCIUM SERPL-MCNC: 9.1 MG/DL (ref 8.5–10.5)
CHLORIDE BLD-SCNC: 104 MMOL/L (ref 98–107)
CHOLEST SERPL-MCNC: 272 MG/DL
CO2 SERPL-SCNC: 25 MMOL/L (ref 22–31)
CREAT SERPL-MCNC: 1.63 MG/DL (ref 0.6–1.1)
FASTING STATUS PATIENT QL REPORTED: YES
GFR SERPL CREATININE-BSD FRML MDRD: 32 ML/MIN/1.73M2
GLUCOSE BLD-MCNC: 92 MG/DL (ref 70–125)
HDLC SERPL-MCNC: 83 MG/DL
LDLC SERPL CALC-MCNC: 169 MG/DL
POTASSIUM BLD-SCNC: 4.1 MMOL/L (ref 3.5–5)
SODIUM SERPL-SCNC: 141 MMOL/L (ref 136–145)
TRIGL SERPL-MCNC: 99 MG/DL

## 2022-04-08 PROCEDURE — 99214 OFFICE O/P EST MOD 30 MIN: CPT | Performed by: FAMILY MEDICINE

## 2022-04-08 PROCEDURE — 36415 COLL VENOUS BLD VENIPUNCTURE: CPT | Performed by: FAMILY MEDICINE

## 2022-04-08 PROCEDURE — 80061 LIPID PANEL: CPT | Performed by: FAMILY MEDICINE

## 2022-04-08 PROCEDURE — 80048 BASIC METABOLIC PNL TOTAL CA: CPT | Performed by: FAMILY MEDICINE

## 2022-04-08 NOTE — PATIENT INSTRUCTIONS
Patient Education     Chronic Kidney Disease (CKD)   The role of the kidneys is to remove waste products and extra water from the blood. When the kidneys don't work as they should, waste products start to build up in the blood. This is called chronic kidney disease (CKD). CKD means that you have kidney damage or a decrease in kidney function lasting at least 3 months. CKD allows extra water, waste, and toxins to build up in the body. This can eventually become life-threatening. You might need dialysis or a kidney transplant to stay alive. This most severe form is called end stage renal disease.  Diabetes is the leading causes of chronic renal failure. Other causes include high blood pressure, hardening of the arteries (atherosclerosis), lupus, inflammation of the blood vessels (vasculitis), and past viral or bacterial infections. Certain over-the-counter pain medicines can cause renal failure when taken often over a long period of time. These include aspirin, ibuprofen, and related anti-inflammatory medicines called NSAIDs (nonsteroidal anti-inflammatory drugs).  Home care  The following guidelines will help you care for yourself at home:    If you have diabetes, talk with your healthcare provider about keeping your blood sugar under control. Ask if you need to make and changes to your diet, lifestyle, or medicines.    If you have high blood pressure:  ? Take prescribed medicine to lower your blood pressure to the recommended goal of less than 130/80.  ? Start a regular exercise program that you enjoy. Check with your healthcare provider to be sure your planned exercise program is right for you.  ? Eat less salt (sodium). Your healthcare provider can tell you how much salt per day is safe for you.    If you are overweight, talk with your healthcare provider about a weight loss plan.    If you smoke, you must quit. Smoking makes kidney disease worse and puts you at risk for developing other serious  illnesses. Talk with your healthcare provider about ways to help you quit.  For more information, visit the following links:  ? www.smokefree.gov/sites/default/files/pdf/clearing-the-air-accessible.pdf  ? www.smokefree.gov  ? www.cancer.org/healthy/stayawayfromtobacco/guidetoquittingsmoking/    Most people with CKD need to follow a special diet.  Be sure you understand yours. In general, you will need to limit protein, salt, potassium, and phosphorus. You also need to limit how much fluid you drink.     CKD is a risk factor for heart disease. Talk with your healthcare provider about any other risk factors you might have and what you can do to lessen them.    Talk with your healthcare provider about any medicines you are taking to find out if they need to be reduced or stopped.    For your own safety, check with your doctor before taking any medicines or supplements. Don't use the following over-the-counter medicines. Or consult your healthcare provider before using them:  ? Aspirin and NSAIDs such as ibuprofen or naproxen. Using acetaminophen for fever or pain is OK.  ? Laxatives and antacids containing magnesium or aluminum  ? Fleet or phospho soda enemas containing phosphorus  ? Certain stomach acid-blocking medicine such as cimetidine or ranitidine   ? Decongestants containing pseudoephedrine   ? Herbal supplements  Follow-up care  Follow up with your healthcare provider, or as advised. Contact one of the following for more information:    American Association of Kidney Patients www.aakp.org    National Kidney Foundation www.kidney.org    American Kidney Fund www.kidneyfund.org    National Kidney Disease Education Program www.nkdep.nih.gov  If an X-ray, ECG (cardiogram), or other diagnostic test was taken, you will be told of any new findings that may affect your care.  Call 911  Call 911 if you have any of the following:    Severe weakness, dizziness, fainting, drowsiness, or confusion    Chest pain or shortness  of breath    Heart beating fast, slow, or irregularly  When to seek medical advice  Call your healthcare provider right away if any of these occur:    Nausea or vomiting    Fever of 100.4 F (38 C) or higher, or as directed by your healthcare provider    Unexpected weight gain or swelling in the legs, ankles, or around the eyes    Decrease or absent urine output    New symptoms or symptoms that get worse  [employer] last reviewed this educational content on 10/1/2019    1068-5653 [employer]. All rights reserved. This information is not intended as a substitute for professional medical care. Always follow your healthcare professional's instructions.

## 2022-04-08 NOTE — PROGRESS NOTES
"  Assessment & Plan     (R53.83) Other fatigue  (primary encounter diagnosis)  Comment: pt has been fatigue for several month and improved a little bit compared to 3 month ago. She has had er visit and many times of doctor office visit in past several month.   Pt was very worried about her heart echo result. She was worried after she read the report from her mychart: septal infarct, and \"The TR velocity cannot be determined due to insufficient tricuspid insufficiency signal\".  We reviewed the summary but She state she did not see the interpretation summary. She state both of her parents  of heart failure and she did not want that   We reviewed her ekg and heart echo that is normal  \" Normal left ventricular size and systolic performance with a visually  estimated ejection fraction of 60%.\"  She was satisfied about the result now.   Unknown etiology of fatigue. Might be many reasons: winter blue, stress, complicated medical and mental disorder etc.   Plan: advise rest and regular exercise. Her ck is normal . As weather is getting warmer she can have outside walking and resume her exercise.       (N18.32) Stage 3b chronic kidney disease (H)  Comment: reviewed the lab in past 2 month with pt. Her gfr has improving. she likes to recheck to monitor it.   Plan: Basic metabolic panel        Advise to avoid otc NSAIDs and she does not take NSAIDs.     (E78.00) Hypercholesteremia  Comment: she is doing Praluent injection. She likes to monitor lipid now.   Plan: Lipid panel        Continue current plan. Advise health low fat diet.          Return if symptoms worsen or fail to improve.    Valeria Salgado MD  [facility] ROMY Delgado is a 81 year old who presents for the following health issues     HPI     1) fatigue for several month.  Improved a little bit compare to 3 month ago.   2) CKD stage 3 and has been improving compare to 3 month ago.  3) hypercholesteremia: she had side effect to " "statin, she is doing Praluent injection.      Review of Systems   Constitutional, HEENT, cardiovascular, pulmonary, gi and gu systems are negative, except as otherwise noted.      Objective    /72 (BP Location: Right arm, Patient Position: Sitting, Cuff Size: Adult Regular)   Pulse 88   Temp 98.5  F (36.9  C) (Oral)   Resp 14   Ht 1.6 m (5' 3\")   Wt 70.8 kg (156 lb)   LMP  (LMP Unknown)   SpO2 97%   Breastfeeding No   BMI 27.63 kg/m    Body mass index is 27.63 kg/m .  Physical Exam   GENERAL: healthy, alert and no distress   RESP: lungs clear to auscultation - no rales, rhonchi or wheezes  CV: regular rate and rhythm, normal S1 S2, no S3 or S4, no murmur, click or rub, no peripheral edema and peripheral pulses strong  ABDOMEN: soft, nontender, no hepatosplenomegaly, no masses and bowel sounds normal              "

## 2022-04-12 ENCOUNTER — TELEPHONE (OUTPATIENT)
Dept: FAMILY MEDICINE | Facility: CLINIC | Age: 82
End: 2022-04-12

## 2022-04-12 ENCOUNTER — HOSPITAL ENCOUNTER (OUTPATIENT)
Dept: PHYSICAL THERAPY | Facility: REHABILITATION | Age: 82
Discharge: HOME | End: 2022-04-12
Payer: MEDICARE

## 2022-04-12 DIAGNOSIS — M54.16 LUMBAR RADICULOPATHY: Primary | ICD-10-CM

## 2022-04-12 DIAGNOSIS — E78.5 DYSLIPIDEMIA, GOAL LDL BELOW 70: Primary | ICD-10-CM

## 2022-04-12 DIAGNOSIS — G89.4 CHRONIC PAIN SYNDROME: ICD-10-CM

## 2022-04-12 DIAGNOSIS — M79.18 MYOFASCIAL PAIN: ICD-10-CM

## 2022-04-12 DIAGNOSIS — G90.523 COMPLEX REGIONAL PAIN SYNDROME TYPE 1 OF BOTH LOWER EXTREMITIES: ICD-10-CM

## 2022-04-12 PROCEDURE — 97112 NEUROMUSCULAR REEDUCATION: CPT | Mod: GP | Performed by: PHYSICAL THERAPIST

## 2022-04-12 PROCEDURE — 97140 MANUAL THERAPY 1/> REGIONS: CPT | Mod: GP | Performed by: PHYSICAL THERAPIST

## 2022-04-12 NOTE — TELEPHONE ENCOUNTER
Spoke with Sandy - she has some questions about insurance, but I do not know the answer... she is having some financial issues therefore I will refer her to the CCC team.

## 2022-04-12 NOTE — PROGRESS NOTES
[facility]    OUTPATIENT PHYSICAL THERAPY  PLAN OF TREATMENT FOR OUTPATIENT REHABILITATION AND PROGRESS NOTE           Patient's Last Name, First Name, Sandy Moss Date of Birth  1940   Provider's Name  [facility] Medical Record No.  [id number]    Onset Date  7/13/22 Start of Care Date  7/13/22   Type:     _X_PT   ___OT   ___SLP Medical Diagnosis  Chronic pain2   PT Diagnosis  Chronic pain Plan of Treatment  Frequency/Duration: 1x/week  Certification date from 4/2/22 to 7/1/22     Goals:  Goal Identifier     Goal Description Patient will have improved quality of sleep/pain and waking times in the night.  8 weeks   Target Date     Date Met      Progress (detail required for progress note): (P) lately has been good and bad - does seem to be better overall.     Goal Identifier     Goal Description Patient will be able to walk 30 minutes on the an incline surfaces for 1/2 mile with less pain and difficulty for community mobility exercise, recreation in 12 weeks   Target Date     Date Met      Progress (detail required for progress note): (P) walking has been ok lately but does still have pain all the time. weather is limiting as well.     Goal Identifier     Goal Description Patient will transfer sit to stand, supine to sit floor to stand for toileting, bed mobility and chair transfers with decreased pain 1-2/10 level of intensity 4 weeks   Target Date     Date Met      Progress (detail required for progress note): she is able to transfer without much of an increased pain level.      Goal Identifier     Goal Description     Target Date     Date Met      Progress (detail required for progress note):       Goal Identifier     Goal Description     Target Date     Date Met      Progress (detail required for progress note):       Goal Identifier     Goal  Description     Target Date     Date Met      Progress (detail required for progress note):       Goal Identifier     Goal Description     Target Date     Date Met      Progress (detail required for progress note):       Goal Identifier     Goal Description     Target Date     Date Met      Progress (detail required for progress note):              I CERTIFY THE NEED FOR THESE SERVICES FURNISHED UNDER        THIS PLAN OF TREATMENT AND WHILE UNDER MY CARE     (Physician co-signature of this document indicates review and certification of the therapy plan).              Referring Provider: DO ANDREY Canas, PT         04/12/22 1200   Signing Clinician's Name / Credentials   Signing clinician's name / credentials Andrey Molina PT   Session Number   Session Number 76   Progress Note/Recertification   Recertification Due Date 07/01/22   Ortho Goal 1   Goal Description Patient will have improved quality of sleep/pain and waking times in the night.  8 weeks   Goal Progress lately has been good and bad - does seem to be better overall.   Ortho Goal 2   Goal Description Patient will be able to walk 30 minutes on the an incline surfaces for 1/2 mile with less pain and difficulty for community mobility exercise, recreation in 12 weeks   Goal Progress walking has been ok lately but does still have pain all the time. weather is limiting as well.   Ortho Goal 3   Goal Description Patient will transfer sit to stand, supine to sit floor to stand for toileting, bed mobility and chair transfers with decreased pain 1-2/10 level of intensity 4 weeks   Goal Progress she is able to transfer without much of an increased pain level.    Subjective Report   Subjective Report She is feeling sort of crappy today. Her neck does seem to be bad today and it does stay on the L side. She is not taking her fentanyl patch and is using a couple ibuprofen instead.   Objective Measures   Objective Measures Objective Measure 1    Objective Measure 1   Objective Measure art, LV, neural fascail tensions   Neuromuscular Re-education   Neuromuscular re-ed of mvmt, balance, coord, kinesthetic sense, posture, proprioception minutes (98011) 15   Skilled Intervention Recip inhib of art, adipose, neural fascial tensions.    Patient Response ex: bridges with knees over bolster, sitting hip isometric add squeezing pillow, seated hip flexion, knee extension, glut isometrics, theraband rows/extensions, theraband biceps/triceps (orange)   Manual Therapy   Manual Therapy: Mobilization, MFR, MLD, friction massage minutes (63322) 40   Skilled Intervention Fascial release using Strain-Counterstrain of BLE-neural/art, B lumbar post visceral-ADP   Plan   Plan for next session strengthening for core-review/add,  manual therapy to decrease fascial tension/tone to normalize ROM/decrease inflammation/decrease mm tone and improve proprioception.     Comments   Comments She does seem to be doing fairly good with her function. She is planning on alternating a few visits with Dr. Ramirez and myself and see how this goes. She does remain appropriate for skilled PT to reach goals.   Total Session Time   Timed Code Treatment Minutes 55   Total Treatment Time (sum of timed and untimed services) 55

## 2022-04-13 ENCOUNTER — PATIENT OUTREACH (OUTPATIENT)
Dept: NURSING | Facility: CLINIC | Age: 82
End: 2022-04-13
Payer: MEDICARE

## 2022-04-13 NOTE — PROGRESS NOTES
Clinic Care Coordination Contact  Community Health Worker Initial Outreach    CHW Initial Information Gathering:  Referral Source: PCP  Preferred Hospital: [facility]  [phone]  Preferred Urgent Care: [facility] [place], [phone]  Current living arrangement:: I live in a private home  Type of residence:: Private home - stairs  Community Resources: None  Supplies Currently Used at Home: None  Informal Support system:: Family, Friends  No PCP office visit in Past Year: No  Transportation means:: Regular car, Friend, Family  CHW Additional Questions  If ED/Hospital discharge, follow-up appointment scheduled as recommended?: N/A  Medication changes made following ED/Hospital discharge?: N/A  MyChart active?: Yes  Patient sent Social Determinants of Health questionnaire?: No    Patient accepts CC: Yes. Patient scheduled for assessment with CC BRYAN on 4/15/2022 at 10am. Patient noted desire to discuss Her finanical situation, patient also has concerns as her insurance joe is expiring on 4/16/2022 and she is worried about affording medications.

## 2022-04-15 ENCOUNTER — PATIENT OUTREACH (OUTPATIENT)
Dept: NURSING | Facility: CLINIC | Age: 82
End: 2022-04-15
Attending: FAMILY MEDICINE
Payer: MEDICARE

## 2022-04-15 ENCOUNTER — PATIENT OUTREACH (OUTPATIENT)
Dept: CARE COORDINATION | Facility: CLINIC | Age: 82
End: 2022-04-15

## 2022-04-15 DIAGNOSIS — E78.5 DYSLIPIDEMIA, GOAL LDL BELOW 70: ICD-10-CM

## 2022-04-15 DIAGNOSIS — M54.16 LUMBAR RADICULOPATHY: ICD-10-CM

## 2022-04-15 RX ORDER — DEXTROAMPHETAMINE SACCHARATE, AMPHETAMINE ASPARTATE, DEXTROAMPHETAMINE SULFATE AND AMPHETAMINE SULFATE 5; 5; 5; 5 MG/1; MG/1; MG/1; MG/1
20 TABLET ORAL 2 TIMES DAILY
Qty: 60 TABLET | Refills: 0 | Status: SHIPPED | OUTPATIENT
Start: 2022-04-15 | End: 2022-05-04

## 2022-04-15 SDOH — ECONOMIC STABILITY: TRANSPORTATION INSECURITY
IN THE PAST 12 MONTHS, HAS THE LACK OF TRANSPORTATION KEPT YOU FROM MEDICAL APPOINTMENTS OR FROM GETTING MEDICATIONS?: NO

## 2022-04-15 SDOH — ECONOMIC STABILITY: FOOD INSECURITY: WITHIN THE PAST 12 MONTHS, THE FOOD YOU BOUGHT JUST DIDN'T LAST AND YOU DIDN'T HAVE MONEY TO GET MORE.: NEVER TRUE

## 2022-04-15 SDOH — ECONOMIC STABILITY: TRANSPORTATION INSECURITY
IN THE PAST 12 MONTHS, HAS LACK OF TRANSPORTATION KEPT YOU FROM MEETINGS, WORK, OR FROM GETTING THINGS NEEDED FOR DAILY LIVING?: NO

## 2022-04-15 SDOH — ECONOMIC STABILITY: INCOME INSECURITY: IN THE LAST 12 MONTHS, WAS THERE A TIME WHEN YOU WERE NOT ABLE TO PAY THE MORTGAGE OR RENT ON TIME?: NO

## 2022-04-15 SDOH — ECONOMIC STABILITY: FOOD INSECURITY: WITHIN THE PAST 12 MONTHS, YOU WORRIED THAT YOUR FOOD WOULD RUN OUT BEFORE YOU GOT MONEY TO BUY MORE.: NEVER TRUE

## 2022-04-15 ASSESSMENT — SOCIAL DETERMINANTS OF HEALTH (SDOH): HOW HARD IS IT FOR YOU TO PAY FOR THE VERY BASICS LIKE FOOD, HOUSING, MEDICAL CARE, AND HEATING?: SOMEWHAT HARD

## 2022-04-15 ASSESSMENT — ACTIVITIES OF DAILY LIVING (ADL): DEPENDENT_IADLS:: CLEANING;SHOPPING

## 2022-04-15 NOTE — TELEPHONE ENCOUNTER
Refill request:  adderall 20 mg  Last dispensed from pharmacy on     Patient's last office/virtual visit by prescribing provider on 2/9/22  Next office/virtual appointment scheduled for 5/4/22    UDT = 4/23/2021  CSA = 5/6/2021    Pending Prescriptions:                       Disp   Refills    amphetamine-dextroamphetamine (ADDERALL) *60 tab*0            Sig: Take 1 tablet (20 mg) by mouth 2 times daily    [employer]

## 2022-04-15 NOTE — PROGRESS NOTES
Patient spoke with CCC SW on 4/15/2022, patient was enrolled in [facility] and goals were established      CHW delegations: None  Plan: Will call patient on 4-20 to complete resource sharing, discuss [place] for hearing aides, patient relations, insurance.       Next outreach due: 5/19/2022  Due to CHW out of the office

## 2022-04-15 NOTE — PROGRESS NOTES
Clinic Care Coordination Contact    Clinic Care Coordination Contact  OUTREACH    Referral Information:  Referral Source: PCP    Primary Diagnosis: Psychosocial    Chief Complaint   Patient presents with     Clinic Care Coordination - Initial        Universal Utilization: appropriate  Clinic Utilization  Difficulty keeping appointments:: No  Compliance Concerns: No  No-Show Concerns: No  No PCP office visit in Past Year: No  Utilization    Hospital Admissions  1             ED Visits  2             No Show Count (past year)  3                Current as of: 4/15/2022 12:44 PM              Clinical Concerns:  Current Medical Concerns:  81 yr old woman, has chronic pain, hearing loss, one kidney.  Fell outside of [facility] and broke her wrist, continues to have weakness and chronic pain.     Current Behavioral Concerns: Has therapy through neurology.      Education Provided to patient: discussed health insurance concerns, reviewed care coordination.    Pain  Pain (GOAL):: No  Health Maintenance Reviewed: Due/Overdue   Health Maintenance Due   Topic Date Due     COPD ACTION PLAN  Never done     COVID-19 Vaccine (1) Never done     HEPATITIS B IMMUNIZATION (1 of 3 - Risk 3-dose series) Never done     MEDICARE ANNUAL WELLNESS VISIT  Never done     FALL RISK ASSESSMENT  09/16/2021     URINE DRUG SCREEN  04/23/2022     MICROALBUMIN  04/30/2022       Clinical Pathway: None    Medication Management:  Medication review status: Medications reviewed and no changes reported per patient.        Working with MTM.  Concerned about costs.      Functional Status:  Dependent ADLs:: Ambulation-walker  Dependent IADLs:: Cleaning, Shopping  Bed or wheelchair confined:: No  Mobility Status: Independent w/Device    Living Situation:  Current living arrangement:: I live in a private home (condo)  Type of residence:: Private home - no stairs    Lifestyle & Psychosocial Needs:    Social Determinants of Health     Tobacco Use: Medium  Risk     Smoking Tobacco Use: Former Smoker     Smokeless Tobacco Use: Never Used   Alcohol Use: Unknown     Frequency of Alcohol Consumption: Monthly or less     Average Number of Drinks: Not asked     Frequency of Binge Drinking: Not asked   Financial Resource Strain: Medium Risk     Difficulty of Paying Living Expenses: Somewhat hard   Food Insecurity: No Food Insecurity     Worried About Running Out of Food in the Last Year: Never true     Ran Out of Food in the Last Year: Never true   Transportation Needs: No Transportation Needs     Lack of Transportation (Medical): No     Lack of Transportation (Non-Medical): No   Physical Activity: Not on file   Stress: Not on file   Social Connections: Not on file   Intimate Partner Violence: Not on file   Depression: Not at risk     PHQ-2 Score: Incomplete   Housing Stability: Low Risk      Unable to Pay for Housing in the Last Year: No     Number of Places Lived in the Last Year: 1     Unstable Housing in the Last Year: No     Diet:: Regular  Inadequate nutrition (GOAL):: No  Tube Feeding: No  Inadequate activity/exercise (GOAL):: No  Significant changes in sleep pattern (GOAL): No  Transportation means:: Regular car, Friend, Family     Latter-day or spiritual beliefs that impact treatment:: No  Mental health DX:: Yes  Mental health DX how managed:: Medication, BHC Services at Primary Care  Mental health management concern (GOAL):: No  Chemical Dependency Status: No Current Concerns  Informal Support system:: Family, Friends     Patient wanted to start the assessment reviewing bills.  She has a bill for a NP at ED when she fell outside of [facility] for $864 in March 2021.  She thinks the hospital was negligent with having a tripping hazard on the property. (called patients relations after completing the call with patient and they would be happy to address this concern with her if she calls directly [phone]).  She also has a bill for an injection she had done  which was ordered by nephrology, $1,000.  She called [facility] billing and did not find the person she talked to helpful. She thinks it is coding issue and the billing office would not assist in changing the code.  Encouraged her to call nephrology at [facility] and discuss with clinic manager or billing department.  She is also having an issue with [facility] getting them to fax information to [employer] [facility] for two shots she got that cost $409.   Stacie said they completed the fax but [employer] said the faxes were blank. She talked to manager at [facility] and did not get the assistance she wanted.      She makes about $1490 in income after paying for her Medicare premiums.  She has used up her FRANCINE paying for medical expenses and only has a small savings.  She has a 17 yr old car, owns her condo.  She talked to [employer] about options and was told she could apply for extra help, but otherwise not much to assist her. She was told that since she has one kidney, she cannot change her insurance.  (Called [facility] after call and learned that she is unable to change her supplemental plan to a different supplemental plan as they health screen.  She could change to a Medicare Advantage plan, but could never return to a supplement after that.)  Discussed looking at [employer] and she would be interested in looking at that.  (After call with [facility], this may not make sense as she would have to drop her current supplement plan and then she could never get it back.)  She would like to get new hearing aides. She talked to [employer] and the price would be about $1600. She would also like to get new glasses.  She is getting SNAP.  She thinks her Part D plan is not good. She is concerned that she has used her therapy benefits through Medicare and needs to take a break. She is not interested in reverse mortgage.  Her kids think she should stop driving. Discussed [employer].  She does not drive on the  freeways.  She would like help with housekeeping tasks especially vacuuming.  She hired a nurse for issue several years ago and continues to use her for a variety of tasks.  She has friends who take her grocery shopping.     Due to time, will assist patient at next appt with calling Patient Relations and looking for resources for cleaning and hearing aides .     Resources and Interventions:  Current Resources:      Community Resources: County Programs, Other (see comment) (private pay nurse help, snap, friend helps with grocery shopping)  Supplies Currently Used at Home: Hearing Aid Batteries  Equipment Currently Used at Home: walker, rolling  Employment Status: retired         Advance Care Plan/Directive  Advanced Care Plans/Directives on file:: Yes  Type Advanced Care Plans/Directives: Advanced Directive - On File  Advanced Care Plan/Directive Status: Declined Further Information    Referrals Placed: Other (medicare partners)       Goals:    Goals        General     1. Financial Wellbeing (pt-stated)      Notes - Note edited  4/15/2022  1:32 PM by Rochelle Weber LSW     Goal Statement: I will review options to lessen my health insurance expenses, as well as my out of pocket medical expenses within 8 months.   Date Goal set: 4-  Barriers: has one kidney that may make her ineligible for changing her Medicare insurance.   Strengths: Can do research, has used [employer] in the past.   Date to Achieve By: 12-  Patient expressed understanding of goal: yes  Action steps to achieve this goal:  1. I will consider [employer].    2. I will review options during open enrollment.   3. I will report progress towards this goal at scheduled outreach telephone calls from the CCC team.             2. Functional (pt-stated)      Notes - Note created  4/15/2022  1:29 PM by Rochelle Weber LSW     Goal Statement: I will have new hearing aides within 9 months.  Date Goal set: 4-  Barriers:  cost  Strengths: has had success using hearing aides in the past.  Date to Achieve By: 1-  Patient expressed understanding of goal: yes  Action steps to achieve this goal:  1. I will review options with  CC.  2. I will consider [employer] as one option.  3. I will report progress towards this goal at scheduled outreach telephone calls from the CCC team.          3. Improve chronic symptoms (pt-stated)      Notes - Note edited  4/15/2022  1:36 PM by Rochelle Weber LSW     Goal Statement: I will have options to hire homemaking assistance within 6 months.   Date Goal set: 4-  Barriers: limited ability to hire help.   Strengths: motivated to find assistance.   Date to Achieve By: 10-  Patient expressed understanding of goal: yes  Action steps to achieve this goal:  1. I will review homemaking options with  CC on 4-20.   2. I will hire homemaking if interested and can afford it.  3. I will report progress towards this goal at scheduled outreach telephone calls from the CCC team.                 Patient/Caregiver understanding: Enrolled in care coordination.    Outreach Frequency: monthly  Future Appointments              In 3 days Deann Meeks Psy.D, FERNANDO M [facility], [facility] WBWW    In 5 days SPMW [facility], [facility] SPMW    In 5 days Darlin Elise PharmD [facility], [facility] VHTS    In 1 week Rudolph Molina PT M [facility], [facility] WBWW    In 1 week Caitlyn Rees MD [facility], [facility] CTGR    In 1 week Michael Ramirez DO M [facility], [facility] MPLW    In 2 weeks Deann Meeks Psy.D, FERNANDO M [facility], [facility] WBWW    In 2 weeks Nikunj Lynn MD M [facility], [facility] MPLW    In 3 weeks Rudolph Molina, JUAN CARLOS M [facility]  [place], [facility] WBWW    In 1 month Rudolph Molina, PT M [facility] [place], [facility] WBWW    In 1 month Deann Meeks Psy.D, LP M [facility], [facility] WBWW          Plan: Will call patient on 4-20 to complete resource sharing, discuss [employer] for hearing aides, patient relations, insurance.    Rochelle Weber,   [facility]  [phone]

## 2022-04-18 ENCOUNTER — VIRTUAL VISIT (OUTPATIENT)
Dept: NEUROLOGY | Facility: CLINIC | Age: 82
End: 2022-04-18
Payer: MEDICARE

## 2022-04-18 DIAGNOSIS — F43.23 ADJUSTMENT DISORDER WITH MIXED ANXIETY AND DEPRESSED MOOD: Primary | ICD-10-CM

## 2022-04-18 PROCEDURE — 90834 PSYTX W PT 45 MINUTES: CPT | Mod: 95 | Performed by: PSYCHOLOGIST

## 2022-04-18 NOTE — PROGRESS NOTES
Psychology Progress Note    Date: April 18, 2022    Time length and type of treatment: 45 minutes (10:05 AM - 10:55 AM), individual therapy    After review of the patient's situation, this visit was changed from an in-person visit to a telephone visit to reduce the risk of COVID 19 exposure. Patient was informed that policies and procedures that govern in-person sessions would also apply to telephone sessions. Patient was also informed that telephone sessions would be discontinued when COVID 19 exposure is no longer a concern (as determined by [facility]).     Patient location: Patient home in [place], [place]  Provider location:  [facility] Neurology - Provider remote location    Patient was in agreement with proceeding with a telephone session.      Necessity: This session is necessary to address the patient's anxiety and depressed mood. Today we focus on the patient's treatment plan, specifically exploring compliance with medical treatment plan and coping strategies. The reader is invited to review the patient's full treatment plan in the Media section of the patient's Epic medical record.    Psychotherapeutic Technique: This writer utilized motivational interviewing, active listening, reassurance and support in the context of cognitive behavioral therapy to address the above.      MENTAL STATUS EVALUATION  Grooming: Unable to determine due to telephone visit  Attire: Unable to determine due to telephone visit  Age: Unable to determine due to telephone visit  Behavior Towards Examiner: Cooperative  Motor Activity: Unable to determine due to telephone visit  Eye Contact: Unable to determine due to telephone visit  Mood: Anxious   Affect: full range  Speech/Language: normal  Attention: Normal  Concentration: Sufficient  Thought Process: unremarkable  Thought Content: Clear    Orientation: Appeared oriented to person, place, and time, though not formally established  Memory: No evidence of  "impairment.  Judgement: Adequate  Estimated Intelligence: Average  Demonstrated Insight: Adequate  Fund of Knowledge: Adequate    Intervention:  Patient reported several positive things, including a talita birthday party for her daughter, finally getting a cat, and going off fentanyl entirely, but also reported increased pain and frustration with weight gain.  Possible benefits of meeting with a dietician were explored, and we discussed pain tolerance strategies.  Depression was reframed as a place and patient noted she leaves her depression when she has things to look forward to. She was encouraged to notice other times when she is able to leave \"depression land.\"     Progress:  Patient reported increased depression secondary to more intense pain, but was able to identify and utilize coping strategies.     Plan:   We will meet again in 3 weeks to address the patient's anxiety and depressed mood.  Estimated duration of treatment is 10+ individual therapy sessions (30002) at intervals of once every three weeks. Treatment is expected to be completed by September 2022.     Diagnosis:  Adjustment Disorder with mixed anxiety and depressed mood  Attention-Deficit/Hyperactivity Disorder, combined presentation  "

## 2022-04-19 NOTE — PROGRESS NOTES
Medication Therapy Management (MTM) Encounter    ASSESSMENT:                            Hyperlipidemia/PAD: Patient to continue Praluent, ok to remain off rosuvastatin due to CK issues which have now resolved. Will recheck her lipids with future appointment with Dr. Rees since her last LDL was high, but it may have been since she was off schedule of Praluent.  If LDL remains elevated, could consider restarting statin at a lower dose.     Hypertension: BP has been at goal <130/80 mmHg due to CKD.  Continue current regimen.    Chronic pain: Continue to follow with the pain clinic.  She is now on fentanyl.  Patient to continue topical therapies, and we discussed using acetaminophen up to 3000 mg/day.    Insomnia: Appears to not be sleeping well despite high-dose trazodone.  We reviewed her DynaPumpight test and alternative options.  Would want to avoid Ambien, Lunesta, Sonata due to her history of sleepwalking.  Mirtazapine was not helpful for her.  We reviewed a trial of doxepin again, and she was interested.  When she tries doxepin, she will discontinue hydroxyzine and decrease trazodone back to 200 mg nightly.  We will follow-up with her next week.      PLAN:                            1.  Start doxepin 3 mg nightly within 30 minutes of bedtime.  Decrease trazodone to 200 mg nightly and stop hydroxyzine    Follow-up: 1-week phone follow-up     SUBJECTIVE/OBJECTIVE:                          Sandy Spencer is a 81 year old female called for a follow up visit. She was referred from Dr. Rees for polypharmacy.     Reason for visit: Follow up since we last spoke 3/23/22  Medication Adherence/Access:  She used to have home care through [facility] but was discharged.  More recently her friend (an RN) has been setting up her medicines for her. Certain oral medications goes right through her -- has 9 inches of her colon. Would like that considered for her medications. She is using [facility] for some of her  medicines.  Prefers capsules.  She gets Praluent and Elquis through prescription assistance programs and getting extra help through Medicare.    Hyperlipidemia/PAD: Currently taking Praluent 75 mg every 14 days.  No longer on rosuvastatin 40 mg daily due to elevated CK.  She was meeting with rheumatology since her CK level had not improved without rosuvastatin for a workup, but it is now improved.    Since stopping rosuvastatin she did not notice any change in myalagias, hard to tell since she has chronic pain.   She was approved for the prescription assistance program for Praluent.  Last CK level = 135 on 3/4/22  Last lipids checked 4/8/22 - she was off schedule one week.     Hypertension: Continues carvedilol 6.25 mg TWICE DAILY (hold if BP <100/60). She has not held yet. We increased carvedilol from 3.125 mg twice daily on 3/2/22. On 1/24/22 at nephrology patient was hypotensive and they decreased carvedilol from 12.5 mg BID. Continued to have hypotension and on 1/31/22 carvedilol decreased further to 3.125 mg and lisinopril decreased from 40 mg. Lisinopril stopped on 2/5/22 due to hypotension.   ECHO on 3/31/22 shows EF 60%  BP Readings from Last 3 Encounters:   04/08/22 130/72   04/07/22 105/54   03/17/22 120/58       Chronic pain: Follows with the pain and spine clinic. No longer on fentanyl 12 mcg every 72 hours.  Reports the pain is more significant, but she does not want to go back on fentanyl.  No longer using ketamine, but still has at home   Hx of gabapentin (memory and weight gain) and Lyrica (throat closes)  Using lidocaine patch -- was helpful for her in the past but her copay was $42, she is using them along with Biofreeze and acetaminophen.  Patient has Narcan at home  She is now also following with Dr. Ramirez.     Insomnia: Met with PCP on 3/16/2022 and was given clonidine 0.1 mg to use at bedtime, but it was not helpful for her sleep therefore she is now using as needed for agitation.  Continues  trazodone 300 mg nightly and hydroxyzine 50 mg nightly.  At last MT appointment we increased trazodone from 200 mg.  She does not find her current regimen helpful, sleeps about 3 to 5 hours a night.  Most nights she has a hard time falling asleep.  Tries to talk to herself, no TV, reads.  After a few hours she is up and cannot fall back asleep.  Does not get a hangover feeling from trazodone.  She wonders about other treatments.   History of sleepwalking with Ambien  Mirtazapine was not helpful  Think she was on doxepin liquid in the past    Today's Vitals: LMP  (LMP Unknown)   ----------------    Allergies/ADRs: Reviewed in chart  Past Medical History: Reviewed in chart  Tobacco: She reports that she quit smoking about 21 years ago. She has a 20.00 pack-year smoking history. She has never used smokeless tobacco.  Alcohol: Reviewed in chart    I spent 26 minutes with this patient today. All changes were made via collaborative practice agreement with Caitlyn Rees MD. A copy of the visit note was provided to the patient's primary care provider.    The patient declined a summary of these recommendations.     Darlin Elise, Pharm.D., [facility]   Medication Therapy Management Pharmacist   [facility]     Telemedicine Visit Details  Type of service:  Telephone visit  Start Time: 11:33 AM  End Time: 11:59 AM  Originating Location (patient location): [place]  Distant Location (provider location):  [facility]     Medication Therapy Recommendations  Insomnia    Current Medication: hydrOXYzine (VISTARIL) 25 MG capsule (Discontinued)   Rationale: More effective medication available - Ineffective medication - Effectiveness   Recommendation: Change Medication - doxepin 3 MG tablet   Status: Accepted per CPA

## 2022-04-20 ENCOUNTER — VIRTUAL VISIT (OUTPATIENT)
Dept: PHARMACY | Facility: CLINIC | Age: 82
End: 2022-04-20
Payer: COMMERCIAL

## 2022-04-20 ENCOUNTER — PATIENT OUTREACH (OUTPATIENT)
Dept: NURSING | Facility: CLINIC | Age: 82
End: 2022-04-20
Payer: MEDICARE

## 2022-04-20 DIAGNOSIS — I15.1 HYPERTENSION SECONDARY TO OTHER RENAL DISORDERS: ICD-10-CM

## 2022-04-20 DIAGNOSIS — E78.5 HYPERLIPIDEMIA LDL GOAL <70: Primary | ICD-10-CM

## 2022-04-20 DIAGNOSIS — G89.4 CHRONIC PAIN SYNDROME: ICD-10-CM

## 2022-04-20 DIAGNOSIS — G47.00 INSOMNIA, UNSPECIFIED TYPE: ICD-10-CM

## 2022-04-20 PROCEDURE — 99607 MTMS BY PHARM ADDL 15 MIN: CPT | Performed by: PHARMACIST

## 2022-04-20 PROCEDURE — 99606 MTMS BY PHARM EST 15 MIN: CPT | Performed by: PHARMACIST

## 2022-04-20 RX ORDER — DOXEPIN 3 MG/1
3 TABLET, FILM COATED ORAL AT BEDTIME
Qty: 30 TABLET | Refills: 0 | Status: SHIPPED | OUTPATIENT
Start: 2022-04-20 | End: 2022-04-29

## 2022-04-20 RX ORDER — CLONIDINE HYDROCHLORIDE 0.1 MG/1
0.1 TABLET ORAL DAILY PRN
Qty: 30 TABLET | Refills: 1
Start: 2022-04-20 | End: 2022-05-04

## 2022-04-20 ASSESSMENT — ACTIVITIES OF DAILY LIVING (ADL): DEPENDENT_IADLS:: CLEANING;SHOPPING

## 2022-04-20 NOTE — PROGRESS NOTES
Clinic Care Coordination Contact    Follow Up Progress Note      Assessment: Patient did call nephrology to see about them correcting billing code issue for labs that was not covered by her insurance.  They just ordered the labs that were done at [facility]. She thinks it is for the labs that were done in Feb or March and would like to get the code corrected and resubmitted to her insurance.  Will ask clinic manager for assistance.     She asked for help with her issue with Oscar Gruber. She has talked to pharmacy and  and neither will assist with billing her joe.  Encouraged her to call [employer] [place] or  if she feels she needs an advocate and to learn her options.  She is discouraged about having to complain.     Gave her patient relations phone number for [employer] and she will call to discuss her concerns regarding the aftermath of her fall a year ago at hospital.  Still having neurological issues a year later.  She had to buy a new pair of glasses that were broken at the fall.      Discussed help with hearing aid costs and will send form to apply for [employer] [facility] Hear program.  She will review and complete if she is interested in that.  Her current hearing aides do not seem to last long.  She also has expenses with her dental needs.      Discussed her supplemental insurance plan and that according to senior linkage line, she should stay with current supplement. She can consider changing her Part D plan each year during open enrollment.  She did work with [facility] to apply for Extra Help.      For her homemaking needs, she would like to have help with paying for that.  [place].  She would like to see if she is eligible.  Her income and assets are less than $20,000.  Called [facility] intake and left message asking them to call her to get her started.  She does have SNAP so has record at [facility].      Care Gaps:    Health  Maintenance Due   Topic Date Due     COPD ACTION PLAN  Never done     COVID-19 Vaccine (1) Never done     HEPATITIS B IMMUNIZATION (1 of 3 - Risk 3-dose series) Never done     MEDICARE ANNUAL WELLNESS VISIT  Never done     FALL RISK ASSESSMENT  09/16/2021     URINE DRUG SCREEN  04/23/2022     MICROALBUMIN  04/30/2022       Declined due to doing this later.      Goals addressed this encounter:    Goals Addressed                    This Visit's Progress       Patient Stated       1. Financial Wellbeing (pt-stated)   10%      Goal Statement: I will review options to lessen my health insurance expenses, as well as my out of pocket medical expenses within 8 months.   Date Goal set: 4-  Barriers: has one kidney that may make her ineligible for changing her Medicare insurance.   Strengths: Can do research, has used [employer] in the past.   Date to Achieve By: 12-  Patient expressed understanding of goal: yes  Action steps to achieve this goal:  1. I will consider [employer].    2. I will review options during open enrollment.   3. I will report progress towards this goal at scheduled outreach telephone calls from the [facility] team.  4- discussed             2. Functional (pt-stated)   10%      Goal Statement: I will have new hearing aides within 9 months.  Date Goal set: 4-  Barriers: cost  Strengths: has had success using hearing aides in the past.  Date to Achieve By: 1-  Patient expressed understanding of goal: yes  Action steps to achieve this goal:  1. I will review options with  CC.  2. I will consider [employer] as one option.  3. I will report progress towards this goal at scheduled outreach telephone calls from the [facility] team.   4- discussed, sent application for [employer]           3. Improve chronic symptoms (pt-stated)   10%      Goal Statement: I will have options to hire homemaking assistance within 6 months.   Date Goal set: 4-  Barriers: limited  ability to hire help.   Strengths: motivated to find assistance.   Date to Achieve By: 10-  Patient expressed understanding of goal: yes  Action steps to achieve this goal:  1. I will review homemaking options with BRYAN PLATA on 4-20.   2. I will hire homemaking if interested and can afford it.  3. I will report progress towards this goal at scheduled outreach telephone calls from the [facility] team.   4- [place]              Intervention/Education provided during outreach: help with bills, hearing aides, advocacy.      Outreach Frequency: monthly    Plan:   Delegating to [facility] to contact in less than 30 days.   Care Coordinator will follow up in 6 weeks and as needed.  Rochelle Weber,   [facility]  [phone]

## 2022-04-26 ENCOUNTER — HOSPITAL ENCOUNTER (OUTPATIENT)
Dept: PHYSICAL THERAPY | Facility: REHABILITATION | Age: 82
Discharge: HOME | End: 2022-04-26
Payer: MEDICARE

## 2022-04-26 DIAGNOSIS — M54.16 LUMBAR RADICULOPATHY: Primary | ICD-10-CM

## 2022-04-26 DIAGNOSIS — G90.523 COMPLEX REGIONAL PAIN SYNDROME TYPE 1 OF BOTH LOWER EXTREMITIES: ICD-10-CM

## 2022-04-26 DIAGNOSIS — M79.18 MYOFASCIAL PAIN: ICD-10-CM

## 2022-04-26 DIAGNOSIS — G89.4 CHRONIC PAIN SYNDROME: ICD-10-CM

## 2022-04-26 PROCEDURE — 97140 MANUAL THERAPY 1/> REGIONS: CPT | Mod: GP | Performed by: PHYSICAL THERAPIST

## 2022-04-26 PROCEDURE — 97112 NEUROMUSCULAR REEDUCATION: CPT | Mod: GP | Performed by: PHYSICAL THERAPIST

## 2022-04-27 ENCOUNTER — OFFICE VISIT (OUTPATIENT)
Dept: FAMILY MEDICINE | Facility: CLINIC | Age: 82
End: 2022-04-27
Payer: MEDICARE

## 2022-04-27 VITALS
SYSTOLIC BLOOD PRESSURE: 120 MMHG | WEIGHT: 156 LBS | DIASTOLIC BLOOD PRESSURE: 68 MMHG | OXYGEN SATURATION: 97 % | BODY MASS INDEX: 27.63 KG/M2 | HEART RATE: 87 BPM

## 2022-04-27 DIAGNOSIS — E78.5 DYSLIPIDEMIA, GOAL LDL BELOW 70: Primary | ICD-10-CM

## 2022-04-27 DIAGNOSIS — I15.1 HYPERTENSION SECONDARY TO OTHER RENAL DISORDERS: ICD-10-CM

## 2022-04-27 DIAGNOSIS — G47.00 INSOMNIA, UNSPECIFIED TYPE: ICD-10-CM

## 2022-04-27 DIAGNOSIS — N18.4 CKD (CHRONIC KIDNEY DISEASE) STAGE 4, GFR 15-29 ML/MIN (H): ICD-10-CM

## 2022-04-27 DIAGNOSIS — T14.8XXA NONHEALING NONSURGICAL WOUND: ICD-10-CM

## 2022-04-27 DIAGNOSIS — R06.02 SHORTNESS OF BREATH: ICD-10-CM

## 2022-04-27 DIAGNOSIS — R60.9 EDEMA, UNSPECIFIED TYPE: ICD-10-CM

## 2022-04-27 PROCEDURE — 99215 OFFICE O/P EST HI 40 MIN: CPT | Performed by: FAMILY MEDICINE

## 2022-04-27 RX ORDER — TORSEMIDE 5 MG/1
TABLET ORAL
Qty: 90 TABLET | Refills: 1 | Status: SHIPPED | OUTPATIENT
Start: 2022-04-27 | End: 2022-05-04

## 2022-04-27 RX ORDER — DOXEPIN HYDROCHLORIDE 10 MG/ML
10 SOLUTION ORAL AT BEDTIME
Qty: 30 ML | Refills: 1 | Status: SHIPPED | OUTPATIENT
Start: 2022-04-27 | End: 2022-05-04

## 2022-04-27 RX ORDER — CARVEDILOL 6.25 MG/1
6.25 TABLET ORAL 2 TIMES DAILY WITH MEALS
Qty: 180 TABLET | Refills: 1 | Status: SHIPPED | OUTPATIENT
Start: 2022-04-27 | End: 2022-09-13

## 2022-04-27 ASSESSMENT — ANXIETY QUESTIONNAIRES
4. TROUBLE RELAXING: SEVERAL DAYS
1. FEELING NERVOUS, ANXIOUS, OR ON EDGE: SEVERAL DAYS
GAD7 TOTAL SCORE: 5
7. FEELING AFRAID AS IF SOMETHING AWFUL MIGHT HAPPEN: NOT AT ALL
6. BECOMING EASILY ANNOYED OR IRRITABLE: SEVERAL DAYS
7. FEELING AFRAID AS IF SOMETHING AWFUL MIGHT HAPPEN: NOT AT ALL
GAD7 TOTAL SCORE: 5
5. BEING SO RESTLESS THAT IT IS HARD TO SIT STILL: NOT AT ALL
2. NOT BEING ABLE TO STOP OR CONTROL WORRYING: SEVERAL DAYS
3. WORRYING TOO MUCH ABOUT DIFFERENT THINGS: SEVERAL DAYS
GAD7 TOTAL SCORE: 5

## 2022-04-27 ASSESSMENT — PATIENT HEALTH QUESTIONNAIRE - PHQ9
10. IF YOU CHECKED OFF ANY PROBLEMS, HOW DIFFICULT HAVE THESE PROBLEMS MADE IT FOR YOU TO DO YOUR WORK, TAKE CARE OF THINGS AT HOME, OR GET ALONG WITH OTHER PEOPLE: SOMEWHAT DIFFICULT
SUM OF ALL RESPONSES TO PHQ QUESTIONS 1-9: 7
SUM OF ALL RESPONSES TO PHQ QUESTIONS 1-9: 7

## 2022-04-27 NOTE — PROGRESS NOTES
Assessment & Plan     Dyslipidemia, goal LDL below 70  -Doing well on current medication but last LDL is elevated. Will update again today, if not coming down can consider referral to lipid clinic  - Lipid panel reflex to direct LDL Non-fasting  - Comprehensive metabolic panel (BMP + Alb, Alk Phos, ALT, AST, Total. Bili, TP)    Edema, unspecified type  -Swelling is perceived to be worse today, will have her trial a low dose mid day in addition to her morning dose. If not improving she will let me know.     Insomnia, unspecified type  -She did not feel a significant improvement with the current doxepin prescription and it is quite expensive. Will have her trial a slightly higher dose of doxepin with her trazodone.   - doxepin (SINEQUAN) 10 MG/ML (HIGH CONC) solution  Dispense: 30 mL; Refill: 1    Shortness of breath  -Continued shortness of breath, will have her undergo PFTs to see if there are signs of pulmonary disease.   - General PFT Lab (Please always keep checked)  - Pulmonary Function Test    CKD (chronic kidney disease) stage 4, GFR 15-29 ml/min (H)  -Per above  - torsemide (DEMADEX) 5 MG tablet  Dispense: 90 tablet; Refill: 1    Hypertension secondary to other renal disorders  -BP at goal today, will continue with current medication  - carvedilol (COREG) 6.25 MG tablet  Dispense: 180 tablet; Refill: 1    Nonhealing Non surgical wound  -Cauterized today again, continues to improve           48 minutes spent on the date of the encounter doing chart review, history and exam, documentation and further activities per the note    Return in about 4 weeks (around 5/25/2022) for Follow up.    Caitlyn Rees MD  [facility] ROMY Delgado is a 81 year old who presents for the following health issues     History of Present Illness       Back Pain:  She presents for follow up of back pain. Patient's back pain is a chronic problem.  Location of back pain:  Right lower back, left  lower back, right upper back, left upper back, right side of neck, left side of neck, right shoulder, right hip and left hip  Description of back pain: burning, dull ache, fullness and stabbing  Back pain spreads: right knee, left knee, right shoulder, left shoulder, right side of neck and left side of neck    Since patient first noticed back pain, pain is: unchanged  Does back pain interfere with her job:  Not applicable      CKD: She uses over the counter pain medication, including Tylenol 500 mg. 2 tablets, a few times a week.    Mental Health Follow-up:  Patient presents to follow-up on Depression & Anxiety.Patient's depression since last visit has been:  No change  The patient is having other symptoms associated with depression.  Patient's anxiety since last visit has been:  No change  The patient is not having other symptoms associated with anxiety.  Any significant life events: health concerns  Patient is not feeling anxious or having panic attacks.  Patient has no concerns about alcohol or drug use.       Today's PHQ-9         PHQ-9 Total Score: 7  PHQ-9 Q9 Thoughts of better off dead/self-harm past 2 weeks :   (P) Not at all    How difficult have these problems made it for you to do your work, take care of things at home, or get along with other people: Somewhat difficult    Today's KT-7 Score: 5    Heart Failure:  She presents for follow up of heart failure. She is experiencing shortness of breath with activity only, which is slightly worse. She is experiencing lower extremity edema which is same as usual. She denies orthopenea and is not coughing at night. Patient is not checking weight daily. She has recently had a weight increase. She has no side effects from medications.  She has had no other medical visits for heart failure since the last visit.    Hyperlipidemia:  She presents for follow up of hyperlipidemia.  She is taking medication to lower cholesterol. She is not having myalgia or other side  effects to statin medications.    Hypertension: She presents for follow up of hypertension.  She does check blood pressure  regularly outside of the clinic. Outside blood pressures have been over 140/90. She follows a low salt diet.     Hypothyroidism:     Since last visit, patient describes the following symptoms::  Anxiety, Depression, Dry skin, Fatigue, Hair loss and Loose stools    Vascular Disease:  She presents for follow up of vascular disease.  She never takes nitroglycerin. She is not taking daily aspirin.    She eats 0-1 servings of fruits and vegetables daily.She consumes 0 sweetened beverage(s) daily.She exercises with enough effort to increase her heart rate 10 to 19 minutes per day.  She exercises with enough effort to increase her heart rate 4 days per week. She is missing 7 dose(s) of medications per week.       She is here today for several issues.     She is not sleeping well still. She did have doxepin started for her, she does note that they are not working. She was on doxepin liquid as well in the past. She is sleeping between 4-6 hours a night. She is usually up after 3 hours.     She is off of the fentanyl patch, pain is increased with this as well as she is trying to get stabilized.     She does have severe fatigue as well, does worry that this is a sign of CHF. She does not have any energy, will stand by her sink and will have to sit down and then lay down due to her light headedness. She does worry that she has CHF due to this, her paretns did have CHF, both of them.     She does note that the sore on her head has opened up a few more times as well.     She is swelling as well. She does wonder if she can go up on the torsemide.     Review of Systems   Per above      Objective    /68   Pulse 87   Wt 70.8 kg (156 lb)   LMP  (LMP Unknown)   SpO2 97%   BMI 27.63 kg/m    Body mass index is 27.63 kg/m .  Physical Exam   GENERAL: healthy, alert and no distress  NECK: no adenopathy, no  asymmetry, masses, or scars and thyroid normal to palpation  RESP: lungs clear to auscultation - no rales, rhonchi or wheezes  CV: regular rates and rhythm, normal S1 S2, no S3 or S4, no murmur, click or rub, peripheral pulses strong and 1+ bilateral lower extremity pitting edema to knee   SKIN: small, shallow wound on right scalp

## 2022-04-27 NOTE — PATIENT INSTRUCTIONS
Billing complaints [place]: If you have any questions about your hospital and clinic bills, call [phone] or [phone].     Patient advocate: [facility]  [phone]

## 2022-04-28 ENCOUNTER — TELEPHONE (OUTPATIENT)
Dept: FAMILY MEDICINE | Facility: CLINIC | Age: 82
End: 2022-04-28

## 2022-04-28 ASSESSMENT — PATIENT HEALTH QUESTIONNAIRE - PHQ9: SUM OF ALL RESPONSES TO PHQ QUESTIONS 1-9: 7

## 2022-04-28 ASSESSMENT — ANXIETY QUESTIONNAIRES: GAD7 TOTAL SCORE: 5

## 2022-04-28 NOTE — PROGRESS NOTES
Medication Therapy Management (MTM) Encounter    ASSESSMENT:                            Insomnia: Appears to have slept a bit better last night, but she has only used to be doxepin liquid once.  Reviewed that she should have received a steroid from the pharmacy, I encouraged her to ask the pharmacy for a syringe so she can measure 1 mL.  Until then, she could take 3 tablets of the doxepin 3 mg (9 mg).  Okay to continue current dose of trazodone, and stay off hydroxyzine.  I would like her to give the doxepin more time.     Edema:  Patient to continue current regimen, and we will check her BMP with future labs.    Hyperlipidemia/PAD: Patient to continue Praluent, ok to remain off rosuvastatin due to CK issues which have now resolved. Will recheck her lipids with future appointment with Dr. Rees since her last LDL was high, but it may have been since she was off schedule of Praluent.  If LDL remains elevated, could consider restarting statin at a lower dose.     Hypertension: BP has been at goal <130/80 mmHg due to CKD.  Continue current regimen.    Chronic pain: Continue to follow with the pain clinic.  She is now off fentanyl.  Patient to continue topical therapies, and we discussed continuing acetaminophen up to 3000 mg/day.      PLAN:                            1.  Continue current regimen    Follow-up: 1-month phone follow-up     SUBJECTIVE/OBJECTIVE:                          Sandy Spencer is a 81 year old female called for a follow up visit. She was referred from Dr. Rees for polypharmacy.     Reason for visit: Follow up since we last spoke 4/20/22  Medication Adherence/Access:  She used to have home care through [facility] but was discharged.  More recently her friend (an RN) has been setting up her medicines for her. Certain oral medications goes right through her -- has 9 inches of her colon. Would like that considered for her medications. She is using [employer] coupon at [facility] for some of her medicines.   Prefers capsules.  She gets Praluent and Elquis through prescription assistance programs and getting extra help through Medicare.    Insomnia: Continues trazodone 300 mg nightly and doxepin -- at last MTM appointment we tried doxepin 3 mg HS, but it was not helpful and expensive therefore on 4/27/22 when she met with PCP she was given the 10 mg/ml liquid. She does not have a syringe to measure, so she did about 1/2 a teaspoon. She used it for the first time last night and slept 8 hours. It took her about an hour to fall asleep and she thinks she got 20 mins deep sleep. No AM hangover feeling. Took with trazodone 300 mg nightl. She held hydroxyzine.  Met with PCP on 3/16/2022 and was given clonidine 0.1 mg to use at bedtime, but it was not helpful for her sleep therefore she is now using as needed for agitation.    History of sleepwalking with Ambien  Mirtazapine was not helpful    Edema: Currently taking torsemide 10 mg daily AM and 5 mg PM (changed on 4/27/22 by PCP). She does feel swollen and her legs are huge but they are a bit better.      Hyperlipidemia/PAD: Currently taking Praluent 75 mg every 14 days.  No longer on rosuvastatin 40 mg daily due to elevated CK.  She was meeting with rheumatology since her CK level had not improved without rosuvastatin for a workup, but it is now improved.    Since stopping rosuvastatin she did not notice any change in myalagias, hard to tell since she has chronic pain.   She was approved for the prescription assistance program for Praluent.  Last CK level = 135 on 3/4/22  Last lipids checked 4/8/22 - she was off schedule one week. Will have rechecked per PCP in June.     Hypertension: Continues carvedilol 6.25 mg TWICE DAILY (hold if BP <100/60). She has not held yet. We increased carvedilol from 3.125 mg twice daily on 3/2/22. On 1/24/22 at nephrology patient was hypotensive and they decreased carvedilol from 12.5 mg BID. Continued to have hypotension and on 1/31/22  carvedilol decreased further to 3.125 mg and lisinopril decreased from 40 mg. Lisinopril stopped on 2/5/22 due to hypotension.   ECHO on 3/31/22 shows EF 60%  BP Readings from Last 3 Encounters:   04/27/22 120/68   04/08/22 130/72   04/07/22 105/54       Chronic pain: Currently taking APAP 1000 mg THREE TIMES DAILY. Follows with the pain and spine clinic, has appointments next week. No longer on fentanyl.  Reports the pain is more significant, but she does not want to go back on fentanyl.  No longer using ketamine, but still has at home   Hx of gabapentin (memory and weight gain) and Lyrica (throat closes)  Using lidocaine patch -- was helpful for her in the past but her copay was $42, she is using them along with Biofreeze and acetaminophen.      Today's Vitals: LMP  (LMP Unknown)   ----------------    Allergies/ADRs: Reviewed in chart  Past Medical History: Reviewed in chart  Tobacco: She reports that she quit smoking about 21 years ago. She has a 20.00 pack-year smoking history. She has never used smokeless tobacco.  Alcohol: Reviewed in chart    I spent 26 minutes with this patient today. All changes were made via collaborative practice agreement with Caitlyn Rees MD. A copy of the visit note was provided to the patient's primary care provider.    The patient declined a summary of these recommendations.     Darlin Elise, Pharm.D., [facility]   Medication Therapy Management Pharmacist   [facility]     Telemedicine Visit Details  Type of service:  Telephone visit  Start Time: 11:33 AM  End Time: 11:59 AM  Originating Location (patient location): Home  Distant Location (provider location):  [facility]     Medication Therapy Recommendations  No medication therapy recommendations to display

## 2022-04-28 NOTE — TELEPHONE ENCOUNTER
Forms Request  Name of form/paperwork: [employer] [facility]  Have you been seen for this request: NA  Do we have the form: placed in provider mailbox  When is form needed by: when complete  How would you like the form returned: fax [phone]  Patient Notified form requests are processed in 3-5 business days: na    Okay to leave a detailed message? na

## 2022-04-29 ENCOUNTER — VIRTUAL VISIT (OUTPATIENT)
Dept: PHARMACY | Facility: CLINIC | Age: 82
End: 2022-04-29
Payer: COMMERCIAL

## 2022-04-29 DIAGNOSIS — G89.4 CHRONIC PAIN SYNDROME: ICD-10-CM

## 2022-04-29 DIAGNOSIS — G47.00 INSOMNIA, UNSPECIFIED TYPE: Primary | ICD-10-CM

## 2022-04-29 DIAGNOSIS — E78.5 HYPERLIPIDEMIA LDL GOAL <70: ICD-10-CM

## 2022-04-29 DIAGNOSIS — R60.1 GENERALIZED EDEMA: ICD-10-CM

## 2022-04-29 DIAGNOSIS — I15.1 HYPERTENSION SECONDARY TO OTHER RENAL DISORDERS: ICD-10-CM

## 2022-04-29 PROCEDURE — 99606 MTMS BY PHARM EST 15 MIN: CPT | Performed by: PHARMACIST

## 2022-05-04 ENCOUNTER — OFFICE VISIT (OUTPATIENT)
Dept: PALLIATIVE MEDICINE | Facility: OTHER | Age: 82
End: 2022-05-04
Payer: MEDICARE

## 2022-05-04 ENCOUNTER — OFFICE VISIT (OUTPATIENT)
Dept: PHYSICAL MEDICINE AND REHAB | Facility: CLINIC | Age: 82
End: 2022-05-04
Payer: MEDICARE

## 2022-05-04 VITALS
HEART RATE: 89 BPM | DIASTOLIC BLOOD PRESSURE: 79 MMHG | BODY MASS INDEX: 27.81 KG/M2 | WEIGHT: 157 LBS | SYSTOLIC BLOOD PRESSURE: 129 MMHG | OXYGEN SATURATION: 97 %

## 2022-05-04 VITALS
WEIGHT: 155 LBS | BODY MASS INDEX: 27.46 KG/M2 | HEART RATE: 96 BPM | DIASTOLIC BLOOD PRESSURE: 62 MMHG | SYSTOLIC BLOOD PRESSURE: 124 MMHG

## 2022-05-04 DIAGNOSIS — M99.03 SOMATIC DYSFUNCTION OF LUMBAR REGION: ICD-10-CM

## 2022-05-04 DIAGNOSIS — M54.12 CERVICAL RADICULAR PAIN: ICD-10-CM

## 2022-05-04 DIAGNOSIS — M54.2 CERVICAL SPINE PAIN: Primary | ICD-10-CM

## 2022-05-04 DIAGNOSIS — M54.16 LUMBAR RADICULOPATHY: ICD-10-CM

## 2022-05-04 DIAGNOSIS — M99.06 SOMATIC DYSFUNCTION OF LOWER EXTREMITY: ICD-10-CM

## 2022-05-04 DIAGNOSIS — Z79.891 LONG TERM (CURRENT) USE OF OPIATE ANALGESIC: Primary | ICD-10-CM

## 2022-05-04 DIAGNOSIS — M99.02 SOMATIC DYSFUNCTION OF THORACIC REGION: ICD-10-CM

## 2022-05-04 DIAGNOSIS — M99.07 SOMATIC DYSFUNCTION OF UPPER EXTREMITY: ICD-10-CM

## 2022-05-04 DIAGNOSIS — M99.08 SOMATIC DYSFUNCTION OF RIB REGION: ICD-10-CM

## 2022-05-04 DIAGNOSIS — M99.04 SOMATIC DYSFUNCTION OF SACROILIAC JOINT: ICD-10-CM

## 2022-05-04 DIAGNOSIS — M53.3 SACROILIAC JOINT PAIN: ICD-10-CM

## 2022-05-04 DIAGNOSIS — M99.00 SOMATIC DYSFUNCTION OF HEAD REGION: ICD-10-CM

## 2022-05-04 DIAGNOSIS — M99.01 SOMATIC DYSFUNCTION OF CERVICAL REGION: ICD-10-CM

## 2022-05-04 DIAGNOSIS — M47.812 ARTHROPATHY OF CERVICAL FACET JOINT: ICD-10-CM

## 2022-05-04 DIAGNOSIS — M99.05 SOMATIC DYSFUNCTION OF PELVIS REGION: ICD-10-CM

## 2022-05-04 PROCEDURE — G0463 HOSPITAL OUTPT CLINIC VISIT: HCPCS

## 2022-05-04 PROCEDURE — 80355 GABAPENTIN NON-BLOOD: CPT | Performed by: ANESTHESIOLOGY

## 2022-05-04 PROCEDURE — 98929 OSTEOPATH MANJ 9-10 REGIONS: CPT | Performed by: PHYSICAL MEDICINE & REHABILITATION

## 2022-05-04 PROCEDURE — 99214 OFFICE O/P EST MOD 30 MIN: CPT | Performed by: ANESTHESIOLOGY

## 2022-05-04 PROCEDURE — 99214 OFFICE O/P EST MOD 30 MIN: CPT | Mod: 25 | Performed by: PHYSICAL MEDICINE & REHABILITATION

## 2022-05-04 RX ORDER — NORTRIPTYLINE HCL 10 MG
CAPSULE ORAL
Qty: 75 CAPSULE | Refills: 3 | Status: SHIPPED | OUTPATIENT
Start: 2022-05-04 | End: 2022-05-27

## 2022-05-04 RX ORDER — DEXTROAMPHETAMINE SACCHARATE, AMPHETAMINE ASPARTATE, DEXTROAMPHETAMINE SULFATE AND AMPHETAMINE SULFATE 5; 5; 5; 5 MG/1; MG/1; MG/1; MG/1
20 TABLET ORAL 2 TIMES DAILY
Qty: 60 TABLET | Refills: 0 | Status: SHIPPED | OUTPATIENT
Start: 2022-05-13 | End: 2022-05-24

## 2022-05-04 RX ORDER — METHOCARBAMOL 500 MG/1
500 TABLET, FILM COATED ORAL 2 TIMES DAILY
Qty: 60 TABLET | Refills: 1 | Status: SHIPPED | OUTPATIENT
Start: 2022-05-04 | End: 2022-05-27

## 2022-05-04 ASSESSMENT — PAIN SCALES - GENERAL
PAINLEVEL: MODERATE PAIN (5)
PAINLEVEL: SEVERE PAIN (6)

## 2022-05-04 NOTE — PATIENT INSTRUCTIONS
Please get Medical records from [place]    2. Osteopathic manual medicine today    3. A sacroiliac joint injection has been ordered today. Please schedule this injection at least   2 weeks from now to allow time for insurance prior authorization. On the day of your injection, you cannot be sick or taking antibiotics. If you become sick and are prescribed, please call the clinic so your injection can be rescheduled for once you have completed your antibiotics. You will need to bring a  with you for your injection. If you have any questions or concerns prior to your injection, please do not hesitate to call the nurse navigation line at [phone].

## 2022-05-04 NOTE — LETTER
5/4/2022         RE: Sandy Spencer  [address]  [facility] [address]        Dear Colleague,    Thank you for referring your patient, Sandy Spencer, to the [facility]. Please see a copy of my visit note below.      Patient presents to the clinic today for a follow up with ARELIS FITZPATRICK MD regarding Pain Management.      PEG Score 12/15/2021 2/9/2022 5/4/2022   PEG Total Score 7.33 9 8.67           UDT = 4/23/2021  CSA = 5/6/2021        Medications-        QUESTIONS:            Mylene Larios  [facility] Patient Facilitator    [facility] - Office Visit    ASSESSMENT & PLAN     Sandy was seen today for pain.    Diagnoses and all orders for this visit:    Long term (current) use of opiate analgesic  -     Drug Profile, Targeted by Tandem Mass Spectrometry and Enzyme Immunoassay, Urine; Future  -     Drug Profile, Targeted by Tandem Mass Spectrometry and Enzyme Immunoassay, Urine    Lumbar radiculopathy  -     amphetamine-dextroamphetamine (ADDERALL) 20 MG tablet; Take 1 tablet (20 mg) by mouth 2 times daily  -     methocarbamol (ROBAXIN) 500 MG tablet; Take 1 tablet (500 mg) by mouth 2 times daily  -     nortriptyline (PAMELOR) 10 MG capsule; One tab bedtime, may increase every 3 nights as needed to 5 bedtime  -     PAIN INJECTION EVAL/TREAT/FOLLOW UP; Future  -     PAIN INJECTION EVAL/TREAT/FOLLOW UP; Future        Patient Instructions   PLAN:    Muscle relaxant methocarbamol, 500 mg twice a day, discussed caution with your kidney disease.    Change the doxepin to nortriptyline 10 mg may increase by 1 tablet every 3-4 nights to help with sleep up to 5 tablets at bedtime, again go slowly with methocarbamol.  May decrease and stop the trazodone    Continue Adderall 20 mg morning and noon.    You may contact Dr. Wang's to have a sacroiliac injection.    We will schedule a cervical medial branch block and if effective may look for radiofrequency  ablation.    Schedule a cooled radiofrequency ablation of your right shoulder with Dr. Lundy, will come from the spine center to the [facility].    Dr. Fitzpatrick to look into doing a block for your right finger and a trigger point in her right upper arm.    Follow-up with Dr. 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    She reviews today has been off fentanyl since 4/14, did not want to continue it.    She continues with problems sleeping, does not feel worse than when she was on the fentanyl, does not think there is an element of postacute withdrawal.  She has tried some doxepin, started a low-dose for sleep and described did not have anything to measure the 1 mL dose and took a half a teaspoon still did not help particularly.  Notes she does not get a deep sleep, just REM sleep and light sleep.    Reviewed she had been on nortriptyline up to 50 mg in the past.  We had been concerned about any anticholinergic effect and memory issues.  She does not think that is the case.    She did see Dr. Wang today.  His note discussed osteopathic manipulation.  Her after visit summary indicates he wanted to do a sacroiliac block.  She is unclear whether to pursue.    She was seen in neurology yesterday for her headaches.  She does not want to have Botox headaches though they have at times.occipital blocks.  She recalls having cervical radiofrequency ablation was most helpful, had this in [place] and it lasted a long time.  She does not want to go to for the process of the medial branch blocks which she felt were very painful and could not tolerate.    The neurologist asked about using Depakote for her headaches, and reviewed  her psychopharmacologic testing.  We looked this up and there were not issues for tricyclic's.    She is concerned about her great first finger which is quite painful to the touch.  She attributes it to her fall and fracture of her wrist several weeks ago.  She indicates pain along the medial aspect of her wrist.  She wonders about block could be done.  She also indicates her right upper arm is been very painful and affect there is a tender point they can be palpated in the right trapezius and midshaft.  She wonders about injection there.    Also recalls had left L4-5 S1 injection last fall which was very helpful for her back and she would like to repeat that.    She continues with the Adderall helping her cognitive function since her concussion 20 mg morning and noon and thinks that is helpful.    She is concerned for the progression of kidney failure having more fluid retention in her legs which is being treated.  She also may have had an adverse effect to a statin increasing CPK now is improving.    Is also concerned about her shoulder and rotator cuff pain having a injection into the joint.  We discussed possibly having a cool RF procedure around the shoulder as she is concerned about additional steroids and would like to pursue that.      Current Outpatient Medications:      [START ON 5/13/2022] amphetamine-dextroamphetamine (ADDERALL) 20 MG tablet, Take 1 tablet (20 mg) by mouth 2 times daily, Disp: 60 tablet, Rfl: 0     apixaban ANTICOAGULANT (ELIQUIS) 5 MG tablet, Take 1 tablet (5 mg) by mouth 2 times daily, Disp: 180 tablet, Rfl: 3     azelastine (ASTEPRO) 0.15 % nasal spray, Spray 2 sprays into both nostrils 2 times daily , Disp: , Rfl:      carvedilol (COREG) 6.25 MG tablet, Take 1 tablet (6.25 mg) by mouth 2 times daily (with meals), Disp: 180 tablet, Rfl: 1     Cholecalciferol (VITAMIN D-3) 125 MCG (5000 UT) TABS, Take 5,000 Units by mouth daily, Disp: , Rfl:      levothyroxine (SYNTHROID/LEVOTHROID) 50  MCG tablet, Take 1 tablet (50 mcg) by mouth daily, Disp: 90 tablet, Rfl: 3     lidocaine (LIDODERM) 5 % patch, Place 1 patch onto the skin every 24 hours To prevent lidocaine toxicity, patient should be patch free for 12 hrs daily., Disp: 30 patch, Rfl: 0     methocarbamol (ROBAXIN) 500 MG tablet, Take 1 tablet (500 mg) by mouth 2 times daily, Disp: 60 tablet, Rfl: 1     nortriptyline (PAMELOR) 10 MG capsule, One tab bedtime, may increase every 3 nights as needed to 5 bedtime, Disp: 75 capsule, Rfl: 3     PRALUENT 75 MG/ML injectable pen, INJECT 75MG UNDER THE SKIN EVERY 14 DAYS, Disp: 12 mL, Rfl: 4     senna (SENOKOT) 8.6 MG tablet, Take 1-3 tablets by mouth daily , Disp: , Rfl:      traZODone (DESYREL) 100 MG tablet, Take 3-4 tablets (300-400 mg) by mouth At Bedtime (Patient taking differently: Take 200-400 mg by mouth At Bedtime Patient taking 2 tablets oral at HS), Disp: 360 tablet, Rfl: 1     venlafaxine (EFFEXOR-XR) 75 MG 24 hr capsule, Take 1 capsule (75 mg) by mouth daily, Disp: 60 capsule, Rfl: 0      Review of Systems   General, psych, musculoskeletal, bowels and bladder otherwise normal other than above.          OBJECTIVE   BP (!) 171/102   Pulse 106   Ht 1.829 m (6')   Wt 103.8 kg (228 lb 14.4 oz)   SpO2 97%   BMI 31.04 kg/m        Physical Exam  General: Alert, clear sensorium appropriately groomed.  Cardiovascular: Normal rate  Lungs: Pulmonary effort is normal, speaking in full sentences  MSK: As noted there is a tender point mid shaft over the triceps.  Her right first finger does not appear to have erythremia, swelling, though tender to the touch as she indicates that toward the wrist where it was fractured.  Skin: Warm and dry. No concerning rashes or lesions.  Neurologic: No focal deficit, alert and oriented x3  Psychiatric: normal mood and affect, cooperative      Assessment: Patient with history of headaches, seen recently in neurology, discussed the possibility of different neuropathic  agents.  Patient would like to pursue cervical radiofrequency ablation which she calls was most helpful in the past and does have degenerative changes.    Lumbar degenerative changes as noted and would also like to repeat lumbar epidural steroid injection.    We discussed if Dr. Wang's does feel there is a findings localized to the sacroiliac joint to do an injection there that would be priority.    We discussed adding back nortriptyline rather than doxepin to advance to help with sleep as she thinks it might of been more helpful at the time has not noted a change in memory.    She recalls muscle relaxants have been helpful in the past such as methocarbamol.  With concern for her kidney problems well start with half the dose and she monitor.    I will look into the possibility of nerve block for her finger and trigger point injection for her arm, she is trying to avoid medications as much as possible at this point in her life.    Total time more than 30 minutes          Again, thank you for allowing me to participate in the care of your patient.        Sincerely,        ARELIS FITZPATRICK MD

## 2022-05-04 NOTE — PATIENT INSTRUCTIONS
PLAN:    Muscle relaxant methocarbamol, 500 mg twice a day, discussed caution with your kidney disease.    Change the doxepin to nortriptyline 10 mg may increase by 1 tablet every 3-4 nights to help with sleep up to 5 tablets at bedtime, again go slowly with methocarbamol.  May decrease and stop the trazodone    Continue Adderall 20 mg morning and noon.    You may contact Dr. Wang's to have a sacroiliac injection.    We will schedule a cervical medial branch block and if effective may look for radiofrequency ablation.    Schedule a cooled radiofrequency ablation of your right shoulder with Dr. Lundy, will come from the spine center to the [facility].    Dr. Lynn to look into doing a block for your right finger and a trigger point in her right upper arm.    Follow-up with Dr. Lynn in 2 to 3 months
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
## 2022-05-04 NOTE — PROGRESS NOTES
Patient presents to the clinic today for a follow up with ARELIS FITZPATRICK MD regarding Pain Management.      PEG Score 12/15/2021 2/9/2022 5/4/2022   PEG Total Score 7.33 9 8.67           UDT = 4/23/2021  CSA = 5/6/2021        Medications-        QUESTIONS:            Mylene CONCEPCION [facility] Patient Facilitator

## 2022-05-04 NOTE — PROGRESS NOTES
[facility] - Office Visit    ASSESSMENT & PLAN     Sandy was seen today for pain.    Diagnoses and all orders for this visit:    Long term (current) use of opiate analgesic  -     Drug Profile, Targeted by Tandem Mass Spectrometry and Enzyme Immunoassay, Urine; Future  -     Drug Profile, Targeted by Tandem Mass Spectrometry and Enzyme Immunoassay, Urine    Lumbar radiculopathy  -     amphetamine-dextroamphetamine (ADDERALL) 20 MG tablet; Take 1 tablet (20 mg) by mouth 2 times daily  -     methocarbamol (ROBAXIN) 500 MG tablet; Take 1 tablet (500 mg) by mouth 2 times daily  -     nortriptyline (PAMELOR) 10 MG capsule; One tab bedtime, may increase every 3 nights as needed to 5 bedtime  -     PAIN INJECTION EVAL/TREAT/FOLLOW UP; Future  -     PAIN INJECTION EVAL/TREAT/FOLLOW UP; Future        Patient Instructions   PLAN:    Muscle relaxant methocarbamol, 500 mg twice a day, discussed caution with your kidney disease.    Change the doxepin to nortriptyline 10 mg may increase by 1 tablet every 3-4 nights to help with sleep up to 5 tablets at bedtime, again go slowly with methocarbamol.  May decrease and stop the trazodone    Continue Adderall 20 mg morning and noon.    You may contact Dr. Wang's to have a sacroiliac injection.    We will schedule a cervical medial branch block and if effective may look for radiofrequency ablation.    Schedule a cooled radiofrequency ablation of your right shoulder with Dr. Lundy, will come from the spine center to the [facility].    Dr. Fitzpatrick to look into doing a block for your right finger and a trigger point in her right upper arm.    Follow-up with Dr. 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    She reviews today has been off fentanyl since 4/14, did not want to continue it.    She continues with problems sleeping, does not feel worse than when she was on the fentanyl, does not think there is an element of postacute withdrawal.  She has tried some doxepin, started a low-dose for sleep and described did not have anything to measure the 1 mL dose and took a half a teaspoon still did not help particularly.  Notes she does not get a deep sleep, just REM sleep and light sleep.    Reviewed she had been on nortriptyline up to 50 mg in the past.  We had been concerned about any anticholinergic effect and memory issues.  She does not think that is the case.    She did see Dr. Wang today.  His note discussed osteopathic manipulation.  Her after visit summary indicates he wanted to do a sacroiliac block.  She is unclear whether to pursue.    She was seen in neurology yesterday for her headaches.  She does not want to have Botox headaches though they have at times.occipital blocks.  She recalls having cervical radiofrequency ablation was most helpful, had this in [place] and it lasted a long time.  She does not want to go to for the process of the medial branch blocks which she felt were very painful and could not tolerate.    The neurologist asked about using Depakote for her headaches, and reviewed her psychopharmacologic testing.  We looked this up and there were not issues for tricyclic's.    She is concerned about her great first finger which is quite painful to the touch.  She attributes it to her fall and fracture of her wrist several weeks ago.  She indicates pain along the medial aspect of her wrist.  She wonders about block could be done.  She also indicates her right upper arm is been very painful and affect there is a tender point they can be palpated in the right trapezius and midshaft.  She wonders about injection there.    Also recalls had left L4-5 S1  injection last fall which was very helpful for her back and she would like to repeat that.    She continues with the Adderall helping her cognitive function since her concussion 20 mg morning and noon and thinks that is helpful.    She is concerned for the progression of kidney failure having more fluid retention in her legs which is being treated.  She also may have had an adverse effect to a statin increasing CPK now is improving.    Is also concerned about her shoulder and rotator cuff pain having a injection into the joint.  We discussed possibly having a cool RF procedure around the shoulder as she is concerned about additional steroids and would like to pursue that.      Current Outpatient Medications:      [START ON 5/13/2022] amphetamine-dextroamphetamine (ADDERALL) 20 MG tablet, Take 1 tablet (20 mg) by mouth 2 times daily, Disp: 60 tablet, Rfl: 0     apixaban ANTICOAGULANT (ELIQUIS) 5 MG tablet, Take 1 tablet (5 mg) by mouth 2 times daily, Disp: 180 tablet, Rfl: 3     azelastine (ASTEPRO) 0.15 % nasal spray, Spray 2 sprays into both nostrils 2 times daily , Disp: , Rfl:      carvedilol (COREG) 6.25 MG tablet, Take 1 tablet (6.25 mg) by mouth 2 times daily (with meals), Disp: 180 tablet, Rfl: 1     Cholecalciferol (VITAMIN D-3) 125 MCG (5000 UT) TABS, Take 5,000 Units by mouth daily, Disp: , Rfl:      levothyroxine (SYNTHROID/LEVOTHROID) 50 MCG tablet, Take 1 tablet (50 mcg) by mouth daily, Disp: 90 tablet, Rfl: 3     lidocaine (LIDODERM) 5 % patch, Place 1 patch onto the skin every 24 hours To prevent lidocaine toxicity, patient should be patch free for 12 hrs daily., Disp: 30 patch, Rfl: 0     methocarbamol (ROBAXIN) 500 MG tablet, Take 1 tablet (500 mg) by mouth 2 times daily, Disp: 60 tablet, Rfl: 1     nortriptyline (PAMELOR) 10 MG capsule, One tab bedtime, may increase every 3 nights as needed to 5 bedtime, Disp: 75 capsule, Rfl: 3     PRALUENT 75 MG/ML injectable pen, INJECT 75MG UNDER THE SKIN EVERY  14 DAYS, Disp: 12 mL, Rfl: 4     senna (SENOKOT) 8.6 MG tablet, Take 1-3 tablets by mouth daily , Disp: , Rfl:      traZODone (DESYREL) 100 MG tablet, Take 3-4 tablets (300-400 mg) by mouth At Bedtime (Patient taking differently: Take 200-400 mg by mouth At Bedtime Patient taking 2 tablets oral at HS), Disp: 360 tablet, Rfl: 1     venlafaxine (EFFEXOR-XR) 75 MG 24 hr capsule, Take 1 capsule (75 mg) by mouth daily, Disp: 60 capsule, Rfl: 0      Review of Systems   General, psych, musculoskeletal, bowels and bladder otherwise normal other than above.          OBJECTIVE   BP (!) 171/102   Pulse 106   Ht 1.829 m (6')   Wt 103.8 kg (228 lb 14.4 oz)   SpO2 97%   BMI 31.04 kg/m        Physical Exam  General: Alert, clear sensorium appropriately groomed.  Cardiovascular: Normal rate  Lungs: Pulmonary effort is normal, speaking in full sentences  MSK: As noted there is a tender point mid shaft over the triceps.  Her right first finger does not appear to have erythremia, swelling, though tender to the touch as she indicates that toward the wrist where it was fractured.  Skin: Warm and dry. No concerning rashes or lesions.  Neurologic: No focal deficit, alert and oriented x3  Psychiatric: normal mood and affect, cooperative      Assessment: Patient with history of headaches, seen recently in neurology, discussed the possibility of different neuropathic agents.  Patient would like to pursue cervical radiofrequency ablation which she calls was most helpful in the past and does have degenerative changes.    Lumbar degenerative changes as noted and would also like to repeat lumbar epidural steroid injection.    We discussed if Dr. Wang's does feel there is a findings localized to the sacroiliac joint to do an injection there that would be priority.    We discussed adding back nortriptyline rather than doxepin to advance to help with sleep as she thinks it might of been more helpful at the time has not noted a change in  memory.    She recalls muscle relaxants have been helpful in the past such as methocarbamol.  With concern for her kidney problems well start with half the dose and she monitor.    I will look into the possibility of nerve block for her finger and trigger point injection for her arm, she is trying to avoid medications as much as possible at this point in her life.    Total time more than 30 minutes

## 2022-05-07 LAB
1OH-MIDAZOLAM UR QL SCN: NOT DETECTED
6MAM UR QL: NOT DETECTED
7AMINOCLONAZEPAM UR QL: NOT DETECTED
A-OH ALPRAZ UR QL: NOT DETECTED
ALPRAZ UR QL: NOT DETECTED
AMPHET UR QL SCN: PRESENT
BARBITURATES UR QL: NOT DETECTED
BUPRENORPHINE UR QL: NOT DETECTED
BZE UR QL: NOT DETECTED
CARBOXYTHC UR QL: NOT DETECTED
CARISOPRODOL UR QL: NOT DETECTED
CLONAZEPAM UR QL: NOT DETECTED
CODEINE UR QL: NOT DETECTED
CREAT UR-MCNC: 20.5 MG/DL
DIAZEPAM UR QL: NOT DETECTED
ETHYL GLUCURONIDE UR QL: NOT DETECTED
FENTANYL UR QL: NOT DETECTED
GABAPENTIN UR QL CFM: NOT DETECTED
HYDROCODONE UR QL: NOT DETECTED
HYDROMORPHONE UR QL: NOT DETECTED
LORAZEPAM UR QL: NOT DETECTED
MDA UR QL: NOT DETECTED
MDEA UR QL: NOT DETECTED
MDMA UR QL: NOT DETECTED
ME-PHENIDATE UR QL: NOT DETECTED
MEPERIDINE UR QL: NOT DETECTED
METHADONE UR QL: NOT DETECTED
METHAMPHET UR QL: NOT DETECTED
MIDAZOLAM UR QL SCN: NOT DETECTED
MORPHINE UR QL: NOT DETECTED
NALOXONE UR QL CFM: NOT DETECTED
NORBUPRENORPHINE UR QL CFM: NOT DETECTED
NORDIAZEPAM UR QL: NOT DETECTED
NORFENTANYL UR QL: NOT DETECTED
NORHYDROCODONE UR QL CFM: NOT DETECTED
NOROXYCODONE UR QL CFM: NOT DETECTED
NOROXYMORPHONE UR QL SCN: NOT DETECTED
OXAZEPAM UR QL: NOT DETECTED
OXYCODONE UR QL: NOT DETECTED
OXYMORPHONE UR QL: NOT DETECTED
PATHOLOGY STUDY: NORMAL
PCP UR QL: NOT DETECTED
PHENTERMINE UR QL: NOT DETECTED
PREGABALIN UR QL CFM: NOT DETECTED
SERVICE CMNT-IMP: NORMAL
TAPENTADOL UR QL SCN: NOT DETECTED
TAPENTADOL UR QL SCN: NOT DETECTED
TEMAZEPAM UR QL: NOT DETECTED
TRAMADOL UR QL: NOT DETECTED
ZOLPIDEM PHENYL-4-CARB UR QL SCN: NOT DETECTED
ZOLPIDEM UR QL: NOT DETECTED

## 2022-05-10 ENCOUNTER — TELEPHONE (OUTPATIENT)
Dept: PALLIATIVE MEDICINE | Facility: OTHER | Age: 82
End: 2022-05-10

## 2022-05-10 ENCOUNTER — HOSPITAL ENCOUNTER (OUTPATIENT)
Dept: PHYSICAL THERAPY | Facility: REHABILITATION | Age: 82
Discharge: HOME | End: 2022-05-10
Payer: MEDICARE

## 2022-05-10 DIAGNOSIS — M54.16 LUMBAR RADICULOPATHY: Primary | ICD-10-CM

## 2022-05-10 DIAGNOSIS — M79.18 MYOFASCIAL PAIN: ICD-10-CM

## 2022-05-10 DIAGNOSIS — G89.4 CHRONIC PAIN SYNDROME: ICD-10-CM

## 2022-05-10 DIAGNOSIS — M79.10 TRIGGER POINT: Primary | ICD-10-CM

## 2022-05-10 DIAGNOSIS — G90.523 COMPLEX REGIONAL PAIN SYNDROME TYPE 1 OF BOTH LOWER EXTREMITIES: ICD-10-CM

## 2022-05-10 PROCEDURE — 97140 MANUAL THERAPY 1/> REGIONS: CPT | Mod: GP | Performed by: PHYSICAL THERAPIST

## 2022-05-10 PROCEDURE — 97112 NEUROMUSCULAR REEDUCATION: CPT | Mod: GP | Performed by: PHYSICAL THERAPIST

## 2022-05-10 NOTE — TELEPHONE ENCOUNTER
Patient calls,VM reporting that she has been taking nortriptyline 10 mg 5 caps at bedtime.  She report that she can then sleep for 1 hour and then will awake and then go back to sleep for another approximately 2 hours.  Next apt with BE: 6/29/22    Per last apt with BE: 5/4/22  Change the doxepin to nortriptyline 10 mg may increase by 1 tablet every 3-4 nights to help with sleep up to 5 tablets at bedtime, again go slowly with methocarbamol.  May decrease and stop the trazodone    Please advise

## 2022-05-11 NOTE — TELEPHONE ENCOUNTER
Patient's call, taking nortriptyline 10 mg 5 at bedtime.  She is not sleeping well.  We have been cautious with tricyclic's due to the anticholinergic aspect with her memory concerns, I did leave a message she can increase to 60 mg.    I did exchange messages with Dr. Marcano, she can do a trigger point injection for the right upper arm.  She did not have any options for the right first finger.    I left a message with the patient to increase nortriptyline to 60 mg at bedtime and reassess, and she may contact orthopedics to evaluate her first finger pain which she attributes to her right wrist fracture.

## 2022-05-15 ENCOUNTER — HEALTH MAINTENANCE LETTER (OUTPATIENT)
Age: 82
End: 2022-05-15

## 2022-05-16 ENCOUNTER — TELEPHONE (OUTPATIENT)
Dept: PHYSICAL MEDICINE AND REHAB | Facility: CLINIC | Age: 82
End: 2022-05-16
Payer: MEDICARE

## 2022-05-16 NOTE — TELEPHONE ENCOUNTER
Message received from the  schedulers that patient called wanting to schedule TPIs for her shoulder. Called patient to discuss symptoms. Left message to return call.

## 2022-05-16 NOTE — TELEPHONE ENCOUNTER
It looks like Dr. Lynn has had a very specific conversation with Dr. Marcano regarding trigger point injections in her arm and shoulder.  I would recommend that she asked the clinic if she can get on a wait list.  I would encourage her to keep the appointment with Dr. Ha for the sacroiliac joint injection in the next couple of weeks.

## 2022-05-16 NOTE — TELEPHONE ENCOUNTER
Patient returned call. Patient reports she has been ordered to have TPIs to her right shoulder. This has been order by Dr. Lynn through the [facility]. She, unfortunately, is scheduled to have these done on 7/18/22 due to provider(s) availability at Pain. She is wondering if these can be done here at [facility]. If we can schedule her here for these, who would be doing them.    Per office note of 5/4/22, Dr. Lynn would also like her to have Coolief RF to right shoulder eventually. Patient does not feel she should have to wait until mid July to get some relief of her pain. She is currently not on any pain medications.     Explained PSP will be notified of the update/concern. Patient will be called with Dr. Ramirez' response. Stated understanding.

## 2022-05-17 ENCOUNTER — TRANSFERRED RECORDS (OUTPATIENT)
Dept: HEALTH INFORMATION MANAGEMENT | Facility: CLINIC | Age: 82
End: 2022-05-17
Payer: MEDICARE

## 2022-05-17 NOTE — TELEPHONE ENCOUNTER
"Call placed to pt with provider's response. Pt stated understanding. Patient states she is already on the wait list but was told she is at the bottom and there is nothing they can do. \"I've been a patient at the [facility] for 6 years and I've never been called from the wait list. I'm not trying to jump clinics or step on anyone's toes, I just think 2 months is too long to wait for any amount of relief\".     Pt inquiring if she should call Dr. Lynn's office again and see if they would be willing and comfortable to send the injection order to Spine, or if Dr. Ramirez would want to talk with Dr. Lynn directly about this.     Will update PSP.     Please advise.   "

## 2022-05-17 NOTE — TELEPHONE ENCOUNTER
Depending on where the trigger points are located,  they may need to be done with ultrasound guidance.  What I would recommend is that she proceed with the sacroiliac joint injections and see how they work for her.  If she can still not get in for the trigger point injections, I can discuss this with her at her postinjection follow-up.

## 2022-05-18 NOTE — TELEPHONE ENCOUNTER
Phone call to patient to discuss PSP's response. Stated understanding.  Assisted with making a follow up appointment 2 weeks post SI joint injections.

## 2022-05-19 ENCOUNTER — PATIENT OUTREACH (OUTPATIENT)
Dept: CARE COORDINATION | Facility: CLINIC | Age: 82
End: 2022-05-19
Payer: MEDICARE

## 2022-05-19 ENCOUNTER — OFFICE VISIT (OUTPATIENT)
Dept: FAMILY MEDICINE | Facility: CLINIC | Age: 82
End: 2022-05-19
Payer: MEDICARE

## 2022-05-19 VITALS
HEART RATE: 90 BPM | RESPIRATION RATE: 15 BRPM | BODY MASS INDEX: 27.99 KG/M2 | TEMPERATURE: 98.9 F | DIASTOLIC BLOOD PRESSURE: 74 MMHG | WEIGHT: 158 LBS | SYSTOLIC BLOOD PRESSURE: 127 MMHG | OXYGEN SATURATION: 97 %

## 2022-05-19 DIAGNOSIS — H10.31 ACUTE CONJUNCTIVITIS OF RIGHT EYE, UNSPECIFIED ACUTE CONJUNCTIVITIS TYPE: Primary | ICD-10-CM

## 2022-05-19 DIAGNOSIS — J06.9 ACUTE URI: ICD-10-CM

## 2022-05-19 PROCEDURE — 99213 OFFICE O/P EST LOW 20 MIN: CPT | Performed by: FAMILY MEDICINE

## 2022-05-19 RX ORDER — CIPROFLOXACIN HYDROCHLORIDE 3.5 MG/ML
1-2 SOLUTION/ DROPS TOPICAL EVERY 4 HOURS
Qty: 5 ML | Refills: 0 | Status: SHIPPED | OUTPATIENT
Start: 2022-05-19 | End: 2022-05-24

## 2022-05-19 NOTE — PROGRESS NOTES
Clinic Care Coordination Contact  [facility]/Voicemail       Clinical Data: Care Coordinator Outreach  Outreach attempted x 1.  Left message on patient's voicemail with call back information and requested return call.  Plan: Care Coordinator Update goal progression  Care Coordinator will try to reach patient again in 2 weeks= 6/1/2022.
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
## 2022-05-23 ENCOUNTER — RADIOLOGY INJECTION OFFICE VISIT (OUTPATIENT)
Dept: PHYSICAL MEDICINE AND REHAB | Facility: CLINIC | Age: 82
End: 2022-05-23
Attending: PHYSICAL MEDICINE & REHABILITATION
Payer: MEDICARE

## 2022-05-23 VITALS
OXYGEN SATURATION: 97 % | DIASTOLIC BLOOD PRESSURE: 68 MMHG | RESPIRATION RATE: 16 BRPM | HEART RATE: 94 BPM | SYSTOLIC BLOOD PRESSURE: 124 MMHG | TEMPERATURE: 99 F

## 2022-05-23 DIAGNOSIS — M53.3 SACROILIAC JOINT PAIN: ICD-10-CM

## 2022-05-23 PROCEDURE — 27096 INJECT SACROILIAC JOINT: CPT | Mod: 50 | Performed by: PAIN MEDICINE

## 2022-05-23 RX ORDER — LIDOCAINE HYDROCHLORIDE 10 MG/ML
INJECTION, SOLUTION EPIDURAL; INFILTRATION; INTRACAUDAL; PERINEURAL
Status: COMPLETED | OUTPATIENT
Start: 2022-05-23 | End: 2022-05-23

## 2022-05-23 RX ORDER — METHYLPREDNISOLONE ACETATE 40 MG/ML
INJECTION, SUSPENSION INTRA-ARTICULAR; INTRALESIONAL; INTRAMUSCULAR; SOFT TISSUE
Status: COMPLETED | OUTPATIENT
Start: 2022-05-23 | End: 2022-05-23

## 2022-05-23 RX ORDER — ROPIVACAINE HYDROCHLORIDE 5 MG/ML
INJECTION, SOLUTION EPIDURAL; INFILTRATION; PERINEURAL
Status: COMPLETED | OUTPATIENT
Start: 2022-05-23 | End: 2022-05-23

## 2022-05-23 RX ADMIN — METHYLPREDNISOLONE ACETATE 40 MG: 40 INJECTION, SUSPENSION INTRA-ARTICULAR; INTRALESIONAL; INTRAMUSCULAR; SOFT TISSUE at 13:25

## 2022-05-23 RX ADMIN — LIDOCAINE HYDROCHLORIDE 2 ML: 10 INJECTION, SOLUTION EPIDURAL; INFILTRATION; INTRACAUDAL; PERINEURAL at 13:24

## 2022-05-23 RX ADMIN — ROPIVACAINE HYDROCHLORIDE 5 ML: 5 INJECTION, SOLUTION EPIDURAL; INFILTRATION; PERINEURAL at 13:24

## 2022-05-23 ASSESSMENT — PAIN SCALES - GENERAL
PAINLEVEL: SEVERE PAIN (7)
PAINLEVEL: NO PAIN (0)

## 2022-05-23 NOTE — PATIENT INSTRUCTIONS
Follow-up visit with Dr. Ramirez in 2-4 weeks to discuss injection outcome and determine care plan going forward.       DISCHARGE INSTRUCTIONS    During office hours (8:00 a.m.- 4:00 p.m.) questions or concerns may be answered  by calling [facility] Navigation Nurses at  [phone].  Messages received after hours will be returned the following business day.      In the case of an emergency, please dial 911 or seek assistance at the nearest Emergency Room/Urgent Care facility.     All Patients:    You may experience an increase in your symptoms for the first 2 days (It may take anywhere between 2 days- 2 weeks for the steroid to have maximum effect).    You may use ice on the injection site, as frequently as 20 minutes each hour if needed.    You may take your pain medicine.    You may continue taking your regular medication after your injection. If you have had a Medial Branch Block you may resume pain medication once your pain diary is completed.    You may shower. No swimming, tub bath or hot tub for 48 hours.  You may remove your bandaid/bandage as soon as you are home.    You may resume light activities, as tolerated.    Resume your usual diet as tolerated.    It is strongly advised that you do not drive for 1-3 hours post injection.    If you have had oral sedation:  Do not drive for 8 hours post injection.      If you have had IV sedation:  Do not drive for 24 hours post injection.  Do not operate hazardous machinery or make important personal/business decisions for 24 hours.      POSSIBLE STEROID SIDE EFFECTS (If steroid/cortisone was used for your procedure)    -If you experience these symptoms, it should only last for a short period    Swelling of the legs              Skin redness (flushing)     Mouth (oral) irritation   Blood sugar (glucose) levels            Sweats                    Mood changes  Headache  Sleeplessness  Weakened immune system for up to 14 days, which could increase the risk of  [place] the COVID-19 virus and/or experiencing more severe symptoms of the disease, if exposed.  Decreased effectiveness of the flu vaccine if given within 2 weeks of the steroid.         POSSIBLE PROCEDURE SIDE EFFECTS  -Call the Spine Center if you are concerned  Increased Pain           Increased numbness/tingling      Nausea/Vomiting          Bruising/bleeding at site      Redness or swelling                                              Difficulty walking      Weakness           Fever greater than 100.5    *In the event of a severe headache after an epidural steroid injection that is relieved by lying down, please call the [facility] to speak with a clinical staff member*

## 2022-05-24 ENCOUNTER — TRANSFERRED RECORDS (OUTPATIENT)
Dept: HEALTH INFORMATION MANAGEMENT | Facility: CLINIC | Age: 82
End: 2022-05-24

## 2022-05-24 ENCOUNTER — NURSE TRIAGE (OUTPATIENT)
Dept: CARDIOLOGY | Facility: CLINIC | Age: 82
End: 2022-05-24

## 2022-05-24 ENCOUNTER — TELEPHONE (OUTPATIENT)
Dept: PALLIATIVE MEDICINE | Facility: OTHER | Age: 82
End: 2022-05-24

## 2022-05-24 ENCOUNTER — HOSPITAL ENCOUNTER (OUTPATIENT)
Dept: PHYSICAL THERAPY | Facility: REHABILITATION | Age: 82
Discharge: HOME | End: 2022-05-24
Payer: MEDICARE

## 2022-05-24 DIAGNOSIS — G90.523 COMPLEX REGIONAL PAIN SYNDROME TYPE 1 OF BOTH LOWER EXTREMITIES: ICD-10-CM

## 2022-05-24 DIAGNOSIS — G89.4 CHRONIC PAIN SYNDROME: ICD-10-CM

## 2022-05-24 DIAGNOSIS — M79.18 MYOFASCIAL PAIN: ICD-10-CM

## 2022-05-24 DIAGNOSIS — M54.16 LUMBAR RADICULOPATHY: ICD-10-CM

## 2022-05-24 DIAGNOSIS — M54.16 LUMBAR RADICULOPATHY: Primary | ICD-10-CM

## 2022-05-24 PROCEDURE — 97140 MANUAL THERAPY 1/> REGIONS: CPT | Mod: GP | Performed by: PHYSICAL THERAPIST

## 2022-05-24 PROCEDURE — 97112 NEUROMUSCULAR REEDUCATION: CPT | Mod: GP | Performed by: PHYSICAL THERAPIST

## 2022-05-24 RX ORDER — DEXTROAMPHETAMINE SACCHARATE, AMPHETAMINE ASPARTATE, DEXTROAMPHETAMINE SULFATE AND AMPHETAMINE SULFATE 5; 5; 5; 5 MG/1; MG/1; MG/1; MG/1
20 TABLET ORAL 2 TIMES DAILY
Qty: 60 TABLET | Refills: 0 | Status: SHIPPED | OUTPATIENT
Start: 2022-05-24 | End: 2022-06-22

## 2022-05-24 NOTE — TELEPHONE ENCOUNTER
Inform pt Adderral is a controlled substance, will only do 30 day supplies -Dr Lynn    Left  msg for pt stating her refill will be sent to [facility], and that only a 30 day supply can be prescribed since it is a controlled medication.    Pending Prescriptions:                       Disp   Refills    amphetamine-dextroamphetamine (ADDERALL) *60 tab*0            Sig: Take 1 tablet (20 mg) by mouth 2 times daily

## 2022-05-24 NOTE — TELEPHONE ENCOUNTER
"Received a call from the call center to discuss chest pain, shortness of breath.  Spoke to Sandy who says she has been having ongoing shortness of breath and fatigue. She says she feels the shortness of breath is getting worse, and is interfering with her daily activities. She says she sometimes gets lightheaded even just standing at her kitchen sink. She also has to stop what she is doing to rest because she is so tired. She says she feels like she cannot take a deep breath. She says she is scheduled for a \"breathing test\" next month at the hospital. She is conversing normally without audible dyspnea. She says she has also had chest pain \"once in a great while\", rating it #6 and feels like \"an elephant is on my chest\", and the last episode was a few days ago. She denies having chest pain currently. She says she went to  last week and was diagnosed with a URI. She also says she has had Covid-19 twice, and has had problems ever since. She also mentions her blood pressure has been elevated, and every morning, her pulse is >100 bpm before medications. She says she did take carvedilol and torsemide today. She says she has chronic pain, and also CKD, so has swelling in her lower extremities.    Advised it is recommended to go to ED for an evaluation. She declines to go to ED, but verbalizes understanding of the recommendation. She says \"it has been worse than it is now\".   Again, advised it is recommended to go to the ED due to numerous symptom concerns. She again, declines, but says \"if it gets worse, then I will go to ED\".  1. LOCATION: \"Where does it hurt?\" Chest   2. RADIATION: \"Does the pain go anywhere else?\" (e.g., into neck, jaw, arms, back)  3. ONSET: \"When did the chest pain begin?\" (Minutes, hours or days) Intermittent  4. PATTERN \"Does the pain come and go, or has it been constant since it started?\" \"Does it get worse with exertion?\"   5. DURATION: \"How long does it last\" (e.g., seconds, minutes, hours)  6. " "SEVERITY: \"How bad is the pain?\" (e.g., Scale 1-10; mild, moderate, or severe) #6, moderate  - MILD (1-3): doesn't interfere with normal activities   - MODERATE (4-7): interferes with normal activities or awakens from sleep  - SEVERE (8-10): excruciating pain, unable to do any normal activities   7. CARDIAC RISK FACTORS: \"Do you have any history of heart problems or risk factors for heart disease?\" (e.g., angina, prior heart attack; diabetes, high blood pressure, high cholesterol, smoker, or strong family history of heart disease) CAD, HTN, CVA  8. PULMONARY RISK FACTORS: \"Do you have any history of lung disease?\" (e.g., blood clots in lung, asthma, emphysema, birth control pills) Chronic PE  9. CAUSE: \"What do you think is causing the chest pain?\"  10. OTHER SYMPTOMS: \"Do you have any other symptoms?\" (e.g., dizziness, nausea, vomiting, sweating, fever, difficulty breathing, cough) lightheadedness, breathing difficulty      Reason for Disposition    Difficulty breathing    Dizziness or lightheadedness    Protocols used: CHEST PAIN-A-OH      "

## 2022-05-24 NOTE — TELEPHONE ENCOUNTER
Pt calls requesting 90 day supply of Adderall 20 mg tablets as pharmacy doesn't always have it in stock.  Last filled-4/20 for #60 tablets    Last follow-up 5/4    Next follow-up 6/29      Last UDT 4/23/21-Due    CSA- 5/6/21-    Please advise if ok for 90 day supply of Adderall. Adderall is due to be refilled  [facility]

## 2022-05-25 ENCOUNTER — NURSE TRIAGE (OUTPATIENT)
Dept: NURSING | Facility: CLINIC | Age: 82
End: 2022-05-25
Payer: MEDICARE

## 2022-05-25 ENCOUNTER — TELEPHONE (OUTPATIENT)
Dept: PALLIATIVE MEDICINE | Facility: OTHER | Age: 82
End: 2022-05-25
Payer: MEDICARE

## 2022-05-25 NOTE — TELEPHONE ENCOUNTER
Central Prior Authorization Team   Phone: [phone]      Prior Authorization Not Needed - Duplicate request    05/25/2022  Medication: Amphetamine-Dextroamphet ER 20 mg - NOT NEEDED  Insurance Company: [employer] - Phone [phone] Fax [phone]  Expected CoPay:      Pharmacy Filling the Rx: [address] [place] [address]    Approval on file - pharmacy is receiving paid claims.

## 2022-05-25 NOTE — TELEPHONE ENCOUNTER
PA Initiation 5/25/2022    Medication: Amphetamine-Dextroamphet ER 20 mg is non formulary  Insurance Company:  Medicare  Pharmacy Filling the Rx: [facility]   Filling Pharmacy Phone: [phone]   Filling Pharmacy Fax:  [phone]  Start Date:  5/24/2022

## 2022-05-25 NOTE — TELEPHONE ENCOUNTER
Dr. Rees is out of the office for the rest of this week.  I will leave this message in her bin for her to review when she returns to the office.  I did respond to a different message earlier today with this information and suggested that patient be seen in the emergency room as well.

## 2022-05-25 NOTE — TELEPHONE ENCOUNTER
Dr. Rees is out of the office for this week.   If patient is having increasing symptoms she needs to be seen in urgent care or the ER.  Please advise patient to be seen at either urgent care or the emergency room now for evaluation of her ongoing symptoms.

## 2022-05-25 NOTE — TELEPHONE ENCOUNTER
Detailed message left on identifiable voicemail. Asked that she call back to confirm she got this message.

## 2022-05-25 NOTE — TELEPHONE ENCOUNTER
Called pt to discuss many symptoms.  Pt reports HR has been > 100 when she wakes up, but not this morning.  She is short of breath and has a weight gain and increased leg swelling.  Weight is up 2-4 pounds.  Pt thinks her kidney function is worsening and driving her elevated blood pressures and fluid retention.  Was seen in urgent care earlier this week and dx with URI and conjunctivitis, declined chest xray.    BP was 170/95  HR 77 upon waking, was 142/82 HR 76 after meds, and now 129/84 HR 65.  Pt takes 6.25 mg carvedilol twice daily.  She said she is on torsemide and varies the dose depending on BP and weight - I don't see torsemide on her list.    Suggested pt touch base with PCP or nephrology if she is not going to ED.  URI can cause chest pressure / pain, as can heart issues.  Pt is scheduled with PCP 6/8/22 - suggested she see if appt can be moved up.    Pt concerned about why her 2/18/22 EKG shows septal infarct - explained to pt that possible septal infarct has been on EKG since 2015, it is not a change.  Echo done 3/31/22 shows normal essentially normal.    She went on to talk about her elevated CK and she thinks she had a heart attack when she lived in [place] (2015).    Next soonest RAC opening is 6/2/22.    Dr Ybarra - any new recommendations from you?    Dr Rees - any possibility pt can get in to see you sooner to sort out her concerns?  -lindy

## 2022-05-25 NOTE — TELEPHONE ENCOUNTER
"    Nurse Triage SBAR    Is this a 2nd Level Triage? YES, LICENSED PRACTITIONER REVIEW IS REQUIRED    Situation: Patient calling today with high blood pressure and some shortness of breath. States \"I know it's my kidneys.\"     Background: Patient has not been feeling well for the last 10 days  5/18 - negative Covid test    Seen in urgent care on 5/19 for Upper respiratory infection and pink eye - done with antibiotic, symptoms are improving    Assessment:   B/P -170/95, P- 77 at 7:30am  B/P -142/82, P- 76 at 9:00am  B/P - 159/89 P- 77 at 12:30pm    Took an extra 5mg of torsemide- 15mg total this morning  Legs are more swollen, non-pitting, no fluid    Only wants to see Dr. Rees - no other provider  Will not go tot UC or ED    Protocol Recommended Disposition:   Go to ED/UCC Now (or to office with PCP approval)    Recommendation:      Routed to provider to advise. Can patient be fit into PCP's schedule for any appointment - in -person or virtual?    Polly Linares RN  05/25/22 12:37 PM  [facility] Nurse Advisor    Does the patient meet one of the following criteria for ADS visit consideration? 16+ years old, with an [facility] PCP     TIP  Providers, please consider if this condition is appropriate for management at one of our Acute and Diagnostic Services sites.     If patient is a good candidate, please use dotphrase <dot>triageresponse and select Refer to ADS to document.    Reason for Disposition    Systolic BP >= 160 OR Diastolic >= 100, and any cardiac or neurologic symptoms (e.g., chest pain, difficulty breathing, unsteady gait, blurred vision)    Additional Information    Negative: Sounds like a life-threatening emergency to the triager    Negative: Pregnant > 20 weeks or postpartum (< 6 weeks after delivery) and new hand or face swelling    Negative: Pregnant > 20 weeks and BP > 140/90    Protocols used: HIGH BLOOD PRESSURE-A-OH      "

## 2022-05-26 ENCOUNTER — TELEPHONE (OUTPATIENT)
Dept: FAMILY MEDICINE | Facility: CLINIC | Age: 82
End: 2022-05-26
Payer: MEDICARE

## 2022-05-26 NOTE — TELEPHONE ENCOUNTER
Left message to call back for: patient  Information to relay to patient: checking in to see how she is today. Did she go into ER yesterday?

## 2022-05-26 NOTE — PROGRESS NOTES
Medication Therapy Management (MTM) Encounter    ASSESSMENT:                            Insomnia: Appears that her sleep has worsened.  We did not discuss this into much detail today.  We will have her stay off hydroxyzine and methocarbamol for now, but consider restarting in the future.  It does not sound like the nortriptyline was very helpful.    Edema:  Patient to continue current regimen, and we will check her BMP with future labs.  Will defer to PCP about her edema since I am unable to assess over the phone.    Hyperlipidemia/PAD: Patient to continue Praluent, ok to remain off rosuvastatin due to CK issues which have now resolved. Will recheck her fasting lipids with future appointment with Dr. Rees since her last LDL was high, but it may have been since she was off schedule of Praluent.  If LDL remains elevated, could consider restarting statin at a lower dose.  Will reach out to PCP about moving the appointment to earlier.    Hypertension: BP has been at goal <130/80 mmHg due to CKD.  Continue current regimen.  Reviewed that she does not need to check so often.  I would recommend checking in the morning and once in the evening.  Reviewed not checking close to after taking adderall. Let us know if >180/>120 mmHg and symptoms, go to the ED.    Chronic pain: Continue to follow with the pain clinic.  She is now off fentanyl.  Patient to continue topical therapies.  Sounds like she may have had some improvement with the methocarbamol, therefore I will refill but at a higher dose and she can use in the evening.    Depression: Uncontrolled.  Patient is willing to restart again today, refill sent.      PLAN:                            1.  Restart methocarbamol at night, but will increase the dose to 1000 mg.  2.  Patient to restart venlafaxine XR 75 mg - refilled    Follow-up: 1-month phone follow-up     SUBJECTIVE/OBJECTIVE:                          Sandy Spencer is a 81 year old female called for a follow up  visit. She was referred from Dr. Rees for polypharmacy.     Reason for visit: Follow up since we last spoke 4/29/22  Medication Adherence/Access:  She used to have home care through [facility] but was discharged.  More recently her friend (an RN) has been setting up her medicines for her. Certain oral medications goes right through her -- has 9 inches of her colon. Would like that considered for her medications. She is using [employer] coupon at [employer] for some of her medicines.  Prefers capsules.  She gets Praluent and Elquis through prescription assistance programs and pays $0. Has spent a lot of money of her medications this year, especially when she was on Fentanyl. She stopped some of her medications due to cost and not wanting to be on things she does not need.     Insomnia: Continues trazodone 400 mg nightly.  She is no longer taking hydroxyzine or nortriptyline.  She is not sleeping well, about 2 to 3 hours a night.  Nortriptyline was to replace doxepin by Dr. Lynn--  we tried doxepin 3 mg HS, but it was not helpful and expensive therefore on 4/27/22 when she met with PCP she was given the 10 mg/ml liquid.   Met with PCP on 3/16/2022 and was given clonidine 0.1 mg to use at bedtime, but it was not helpful for her sleep  History of sleepwalking with Ambien  Mirtazapine was not helpful    Edema: Currently taking torsemide 10 mg daily AM and 5 mg afternoon (changed on 4/27/22 by PCP).  Reports that if her blood pressure is high in the morning, she will take 15 mg all at once.  Occasionally she will take a second 5 mg in the afternoon.  Reports that her legs are still swollen.    Hyperlipidemia/PAD: Currently taking Praluent 75 mg every 14 days.  No longer on rosuvastatin 40 mg daily due to elevated CK.  She was meeting with rheumatology since her CK level had not improved without rosuvastatin for a workup, but it is now improved.   Since stopping rosuvastatin she did not notice any change in myalagias, hard to  tell since she has chronic pain.   She was approved for the prescription assistance program for Praluent.  Last CK level = 135 on 3/4/22  Last lipids checked 22 - she was off schedule one week. Will have rechecked per PCP in .  She is not sure if she can fast until 1pm, which is when her appointment with PCP is    Hypertension: Continues carvedilol 6.25 mg TWICE DAILY (hold if BP <100/60). We increased carvedilol from 3.125 mg twice daily on 3/2/22. On 22 at nephrology patient was hypotensive and they decreased carvedilol from 12.5 mg BID. Continued to have hypotension and on 22 carvedilol decreased further to 3.125 mg and lisinopril decreased from 40 mg. Lisinopril stopped on 22 due to hypotension.   She feels like her blood pressure at home is high. She checked three times this mornin/76 pulse 75. 152/84 77, 160/90 mmHg pulse 80. She did bring in her meter to compare and was told the results are similar.  ECHO on 3/31/22 shows EF 60%  BP Readings from Last 3 Encounters:   22 124/68   22 127/74   22 129/79       Chronic pain: Currently taking APAP 1000 mg THREE TIMES DAILY.  She stopped methocarbamol 500 mg.  She was only taking in the evening.  She does think that her pain was worse when she stopped it.  Underwent a bilateral SI joint steroid injection on 2022  Methorcarbamol 500 mg twice daily was started by Dr. Lynn on 22.  Follows with the pain and spine clinic.   No longer on fentanyl.  Reports the pain is more significant, but she does not want to go back on fentanyl.  No longer using ketamine, but still has at home   Hx of gabapentin (memory and weight gain) and Lyrica (throat closes)    Depression: Currently taking no medication.  She stopped venlafaxine on her own.  She does think that her mood is worse.      Today's Vitals: LMP  (LMP Unknown)   ----------------    Allergies/ADRs: Reviewed in chart  Past Medical History: Reviewed in chart  Tobacco:  She reports that she quit smoking about 21 years ago. She has a 20.00 pack-year smoking history. She has never used smokeless tobacco.  Alcohol: Reviewed in chart    I spent 42 minutes with this patient today. All changes were made via collaborative practice agreement with Caitlyn Rees MD. A copy of the visit note was provided to the patient's primary care provider.    The patient declined a summary of these recommendations.     Darlin Elise, Pharm.D., [facility]   Medication Therapy Management Pharmacist   [facility]     Telemedicine Visit Details  Type of service:  Telephone visit  Start Time: 11:35 AM  End Time: 12:17 PM  Originating Location (patient location): [place]  Distant Location (provider location):  [facility]     Medication Therapy Recommendations  Anxiety disorder due to medical condition    Current Medication: venlafaxine (EFFEXOR-XR) 75 MG 24 hr capsule (Discontinued)   Rationale: Patient prefers not to take - Adherence - Adherence   Recommendation: Provide Adherence Intervention   Status: Accepted per CPA         Chronic pain syndrome    Current Medication: methocarbamol (ROBAXIN) 500 MG tablet (Discontinued)   Rationale: Dose too low - Dosage too low - Effectiveness   Recommendation: Increase Dose   Status: Accepted per CPA

## 2022-05-26 NOTE — TELEPHONE ENCOUNTER
Reason for Call:  Other returning call    Detailed comments:       Unable to document in Nurse Triage:    Patient called back stating that she did not go to the ER, she has a URI and Pink Eye and did not want to expose anyone. She is aware Dr. Rees is out for the week, she would like to ask if there was the possibility to get in before 6/8/22. I did add her to the wait list and told her I would relay message to Lorena for her.      Call taken on 5/26/2022 at 1:17 PM by Oliva Rodrigues

## 2022-05-27 ENCOUNTER — TELEPHONE (OUTPATIENT)
Dept: FAMILY MEDICINE | Facility: CLINIC | Age: 82
End: 2022-05-27
Payer: MEDICARE

## 2022-05-27 ENCOUNTER — VIRTUAL VISIT (OUTPATIENT)
Dept: PHARMACY | Facility: CLINIC | Age: 82
End: 2022-05-27
Payer: COMMERCIAL

## 2022-05-27 DIAGNOSIS — N18.4 CKD (CHRONIC KIDNEY DISEASE) STAGE 4, GFR 15-29 ML/MIN (H): ICD-10-CM

## 2022-05-27 DIAGNOSIS — F06.4 ANXIETY DISORDER DUE TO MEDICAL CONDITION: ICD-10-CM

## 2022-05-27 DIAGNOSIS — I15.1 HYPERTENSION SECONDARY TO OTHER RENAL DISORDERS: ICD-10-CM

## 2022-05-27 DIAGNOSIS — M54.16 LUMBAR RADICULOPATHY: ICD-10-CM

## 2022-05-27 DIAGNOSIS — E78.5 DYSLIPIDEMIA, GOAL LDL BELOW 70: Primary | ICD-10-CM

## 2022-05-27 DIAGNOSIS — R60.1 GENERALIZED EDEMA: ICD-10-CM

## 2022-05-27 DIAGNOSIS — E78.5 HYPERLIPIDEMIA LDL GOAL <70: ICD-10-CM

## 2022-05-27 DIAGNOSIS — G47.00 INSOMNIA, UNSPECIFIED TYPE: Primary | ICD-10-CM

## 2022-05-27 PROCEDURE — 99607 MTMS BY PHARM ADDL 15 MIN: CPT | Performed by: PHARMACIST

## 2022-05-27 PROCEDURE — 99606 MTMS BY PHARM EST 15 MIN: CPT | Performed by: PHARMACIST

## 2022-05-27 RX ORDER — METHOCARBAMOL 500 MG/1
1000 TABLET, FILM COATED ORAL 2 TIMES DAILY
Qty: 360 TABLET | Refills: 0 | Status: SHIPPED | OUTPATIENT
Start: 2022-05-27 | End: 2022-07-01

## 2022-05-27 RX ORDER — VENLAFAXINE HYDROCHLORIDE 75 MG/1
75 CAPSULE, EXTENDED RELEASE ORAL DAILY
Qty: 90 CAPSULE | Refills: 0 | Status: SHIPPED | OUTPATIENT
Start: 2022-05-27 | End: 2022-07-01

## 2022-05-27 NOTE — TELEPHONE ENCOUNTER
Message from PharmDarlin CEBALLOS, asking for patient to have fasting labs done PRIOR to Appointment 6/8/22 visit with Dr Rees. Orders for fasting labs.   See pharmD note in chart for discussion of those labs.    Will send to DR Rees for review of orders prior to visit.

## 2022-05-27 NOTE — Clinical Note
Patient is seeing Allen on 6/8 but will need to fast for labs -- could she be seen earlier in the day? She would like 11am or earlier. Thanks!

## 2022-05-30 ENCOUNTER — PATIENT OUTREACH (OUTPATIENT)
Dept: CARE COORDINATION | Facility: CLINIC | Age: 82
End: 2022-05-30
Payer: MEDICARE

## 2022-05-30 NOTE — PROGRESS NOTES
Care Coordination Clinician Chart Review     Situation: Patient chart reviewed by care coordinator.?     Background: Initial assessment and enrollment to Care Coordination was 4-.?? Patient centered goals were developed with participation from patient.? Lead CC handed patient off to CHW for continued outreach every 30 days.??     Assessment: Per chart review, patient outreach completed by CC CHW on 5- leaving .? Patient is actively working to accomplish goal(s).? Patient's goal(s) remain(s) appropriate at this time.? Patient is not due for updated Plan of Care.? Annual assessment will be due 4-.      Goals        Patient Stated       1. Financial Wellbeing (pt-stated)       Goal Statement: I will review options to lessen my health insurance expenses, as well as my out of pocket medical expenses within 8 months.   Date Goal set: 4-  Barriers: has one kidney that may make her ineligible for changing her Medicare insurance.   Strengths: Can do research, has used [employer] in the past.   Date to Achieve By: 12-  Patient expressed understanding of goal: yes  Action steps to achieve this goal:  1. I will consider [employer].    2. I will review options during open enrollment.   3. I will report progress towards this goal at scheduled outreach telephone calls from the [facility] team.  4- discussed             2. Functional (pt-stated)       Goal Statement: I will have new hearing aides within 9 months.  Date Goal set: 4-  Barriers: cost  Strengths: has had success using hearing aides in the past.  Date to Achieve By: 1-  Patient expressed understanding of goal: yes  Action steps to achieve this goal:  1. I will review options with [employer].  2. I will consider [employer] as one option.  3. I will report progress towards this goal at scheduled outreach telephone calls from the [facility] team.   4- discussed, sent application for [employer]           3. Improve  chronic symptoms (pt-stated)       Goal Statement: I will have options to hire homemaking assistance within 6 months.   Date Goal set: 4-  Barriers: limited ability to hire help.   Strengths: motivated to find assistance.   Date to Achieve By: 10-  Patient expressed understanding of goal: yes  Action steps to achieve this goal:  1. I will review homemaking options with SW CC on 4-20.   2. I will hire homemaking if interested and can afford it.  3. I will report progress towards this goal at scheduled outreach telephone calls from the [facility] team.   4- referred to [place] [employer] [place].          ??     Plan/Recommendations: The patient will continue working with Care Coordination to achieve above goal(s).? CHW will involve Lead CC as needed or if patient is ready to move to maintenance.? Lead CC will continue to monitor CHW s monthly outreaches and progress to goal(s) every 6 weeks.?     Plan of Care updated and sent to patient: No

## 2022-06-01 ENCOUNTER — PATIENT OUTREACH (OUTPATIENT)
Dept: CARE COORDINATION | Facility: CLINIC | Age: 82
End: 2022-06-01
Payer: MEDICARE

## 2022-06-01 NOTE — PROGRESS NOTES
Clinic Care Coordination Contact  [facility]/Voicemail       Clinical Data: Care Coordinator Outreach  Outreach attempted x 1.  Left message on patient's voicemail with call back information and requested return call.  Plan: Care Coordinator update goal progression  1. Homemaking assistance  2. Hearing aides  3. Lessen insurance expenses    Care Coordinator will try to reach patient again in 10 business days.  6/13/2022    Patient enrolled in [facility] on 4/15/2022

## 2022-06-01 NOTE — TELEPHONE ENCOUNTER
Orders are in for the labs. She can certainly make a lab only appointment for her labs prior to her next visit with me. Ideally at least 2-3 days prior to make sure they are back.

## 2022-06-02 NOTE — TELEPHONE ENCOUNTER
Talked to patient and she said her blood pressure is typically 170's and her pulse is at 130 bpm when she wakes up. Pt is light headed when getting up and moving but is fine when sitting down. Pt reports legs are still swollen but not better or worse than previously. Pt has appointment tomorrow for breathing issues.     Cristina Holman CMA.

## 2022-06-03 ENCOUNTER — HOSPITAL ENCOUNTER (OUTPATIENT)
Dept: RESPIRATORY THERAPY | Facility: CLINIC | Age: 82
Discharge: HOME | End: 2022-06-03
Attending: FAMILY MEDICINE | Admitting: FAMILY MEDICINE
Payer: MEDICARE

## 2022-06-03 DIAGNOSIS — R06.02 SHORTNESS OF BREATH: ICD-10-CM

## 2022-06-03 LAB — HGB BLD-MCNC: 12.6 G/DL (ref 11.7–15.7)

## 2022-06-03 PROCEDURE — 250N000009 HC RX 250: Performed by: FAMILY MEDICINE

## 2022-06-03 PROCEDURE — 94729 DIFFUSING CAPACITY: CPT | Mod: 26 | Performed by: INTERNAL MEDICINE

## 2022-06-03 PROCEDURE — 94060 EVALUATION OF WHEEZING: CPT

## 2022-06-03 PROCEDURE — 85018 HEMOGLOBIN: CPT | Performed by: FAMILY MEDICINE

## 2022-06-03 PROCEDURE — 36415 COLL VENOUS BLD VENIPUNCTURE: CPT | Performed by: FAMILY MEDICINE

## 2022-06-03 PROCEDURE — 94726 PLETHYSMOGRAPHY LUNG VOLUMES: CPT

## 2022-06-03 PROCEDURE — 94060 EVALUATION OF WHEEZING: CPT | Mod: 26 | Performed by: INTERNAL MEDICINE

## 2022-06-03 PROCEDURE — 94726 PLETHYSMOGRAPHY LUNG VOLUMES: CPT | Mod: 26 | Performed by: INTERNAL MEDICINE

## 2022-06-03 PROCEDURE — 94729 DIFFUSING CAPACITY: CPT

## 2022-06-03 PROCEDURE — 999N000157 HC STATISTIC RCP TIME EA 10 MIN

## 2022-06-03 RX ORDER — ALBUTEROL SULFATE 0.83 MG/ML
2.5 SOLUTION RESPIRATORY (INHALATION) ONCE
Status: COMPLETED | OUTPATIENT
Start: 2022-06-03 | End: 2022-06-03

## 2022-06-03 RX ADMIN — ALBUTEROL SULFATE 2.5 MG: 2.5 SOLUTION RESPIRATORY (INHALATION) at 10:39

## 2022-06-03 NOTE — PROGRESS NOTES
RESPIRATORY CARE NOTE     Patient Name: Sandy Spencer  Today's Date: 6/3/2022     Complete PFT done. Pt performed tests with good effort. Test results meet ATS criteria. Albuterol neb given.Results scanned into epic. Pt left in no distress.       Brandi Ayala, RT

## 2022-06-04 LAB
DLCOCOR-%PRED-PRE: 69 %
DLCOCOR-PRE: 12.7 ML/MIN/MMHG
DLCOUNC-%PRED-PRE: 67 %
DLCOUNC-PRE: 12.37 ML/MIN/MMHG
DLCOUNC-PRED: 18.22 ML/MIN/MMHG
ERV-%PRED-PRE: 62 %
ERV-PRE: 0.29 L
ERV-PRED: 0.47 L
EXPTIME-PRE: 6.57 SEC
FEF2575-%PRED-POST: 79 %
FEF2575-%PRED-PRE: 86 %
FEF2575-POST: 1.23 L/SEC
FEF2575-PRE: 1.34 L/SEC
FEF2575-PRED: 1.55 L/SEC
FEFMAX-%PRED-PRE: 117 %
FEFMAX-PRE: 5.54 L/SEC
FEFMAX-PRED: 4.73 L/SEC
FEV1-%PRED-PRE: 88 %
FEV1-PRE: 1.67 L
FEV1FEV6-PRE: 79 %
FEV1FEV6-PRED: 78 %
FEV1FVC-PRE: 77 %
FEV1FVC-PRED: 77 %
FEV1SVC-PRE: 84 %
FEV1SVC-PRED: 69 %
FIFMAX-PRE: 3.67 L/SEC
FRCPLETH-%PRED-PRE: 114 %
FRCPLETH-PRE: 3.06 L
FRCPLETH-PRED: 2.66 L
FVC-%PRED-PRE: 88 %
FVC-PRE: 2.18 L
FVC-PRED: 2.48 L
IC-%PRED-PRE: 74 %
IC-PRE: 1.69 L
IC-PRED: 2.28 L
RVPLETH-%PRED-PRE: 128 %
RVPLETH-PRE: 2.77 L
RVPLETH-PRED: 2.16 L
TLCPLETH-%PRED-PRE: 99 %
TLCPLETH-PRE: 4.76 L
TLCPLETH-PRED: 4.77 L
VA-%PRED-PRE: 90 %
VA-PRE: 4.02 L
VC-%PRED-PRE: 72 %
VC-PRE: 1.99 L
VC-PRED: 2.75 L

## 2022-06-06 ENCOUNTER — OFFICE VISIT (OUTPATIENT)
Dept: PHYSICAL MEDICINE AND REHAB | Facility: CLINIC | Age: 82
End: 2022-06-06
Payer: MEDICARE

## 2022-06-06 VITALS — SYSTOLIC BLOOD PRESSURE: 110 MMHG | DIASTOLIC BLOOD PRESSURE: 56 MMHG | HEART RATE: 101 BPM

## 2022-06-06 DIAGNOSIS — M51.369 DDD (DEGENERATIVE DISC DISEASE), LUMBAR: ICD-10-CM

## 2022-06-06 DIAGNOSIS — M47.812 ARTHROPATHY OF CERVICAL FACET JOINT: ICD-10-CM

## 2022-06-06 DIAGNOSIS — M99.07 SOMATIC DYSFUNCTION OF UPPER EXTREMITY: ICD-10-CM

## 2022-06-06 DIAGNOSIS — M99.03 SOMATIC DYSFUNCTION OF LUMBAR REGION: ICD-10-CM

## 2022-06-06 DIAGNOSIS — M99.08 SOMATIC DYSFUNCTION OF RIB REGION: ICD-10-CM

## 2022-06-06 DIAGNOSIS — M99.02 SOMATIC DYSFUNCTION OF THORACIC REGION: ICD-10-CM

## 2022-06-06 DIAGNOSIS — M99.01 SOMATIC DYSFUNCTION OF CERVICAL REGION: ICD-10-CM

## 2022-06-06 DIAGNOSIS — M79.18 MYOFASCIAL PAIN: ICD-10-CM

## 2022-06-06 DIAGNOSIS — M53.3 SACROILIAC JOINT PAIN: Primary | ICD-10-CM

## 2022-06-06 DIAGNOSIS — M99.05 SOMATIC DYSFUNCTION OF PELVIS REGION: ICD-10-CM

## 2022-06-06 DIAGNOSIS — M99.00 SOMATIC DYSFUNCTION OF HEAD REGION: ICD-10-CM

## 2022-06-06 DIAGNOSIS — M99.06 SOMATIC DYSFUNCTION OF LOWER EXTREMITY: ICD-10-CM

## 2022-06-06 DIAGNOSIS — M99.04 SOMATIC DYSFUNCTION OF SACROILIAC JOINT: ICD-10-CM

## 2022-06-06 PROCEDURE — 98929 OSTEOPATH MANJ 9-10 REGIONS: CPT | Performed by: PHYSICAL MEDICINE & REHABILITATION

## 2022-06-06 PROCEDURE — 99213 OFFICE O/P EST LOW 20 MIN: CPT | Mod: 25 | Performed by: PHYSICAL MEDICINE & REHABILITATION

## 2022-06-06 ASSESSMENT — PAIN SCALES - GENERAL: PAINLEVEL: SEVERE PAIN (6)
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
## 2022-06-08 ENCOUNTER — OFFICE VISIT (OUTPATIENT)
Dept: FAMILY MEDICINE | Facility: CLINIC | Age: 82
End: 2022-06-08
Payer: MEDICARE

## 2022-06-08 VITALS
BODY MASS INDEX: 27.81 KG/M2 | WEIGHT: 157 LBS | TEMPERATURE: 98.4 F | RESPIRATION RATE: 16 BRPM | DIASTOLIC BLOOD PRESSURE: 58 MMHG | SYSTOLIC BLOOD PRESSURE: 110 MMHG | HEART RATE: 77 BPM | OXYGEN SATURATION: 98 %

## 2022-06-08 DIAGNOSIS — N18.4 CKD (CHRONIC KIDNEY DISEASE) STAGE 4, GFR 15-29 ML/MIN (H): ICD-10-CM

## 2022-06-08 DIAGNOSIS — R53.83 OTHER FATIGUE: ICD-10-CM

## 2022-06-08 DIAGNOSIS — R00.2 PALPITATIONS: ICD-10-CM

## 2022-06-08 DIAGNOSIS — H10.13 ALLERGIC CONJUNCTIVITIS, BILATERAL: ICD-10-CM

## 2022-06-08 DIAGNOSIS — E78.5 DYSLIPIDEMIA, GOAL LDL BELOW 70: ICD-10-CM

## 2022-06-08 DIAGNOSIS — R06.02 SHORTNESS OF BREATH: Primary | ICD-10-CM

## 2022-06-08 LAB
ANION GAP SERPL CALCULATED.3IONS-SCNC: 13 MMOL/L (ref 5–18)
BNP SERPL-MCNC: 39 PG/ML (ref 0–151)
BUN SERPL-MCNC: 37 MG/DL (ref 8–28)
CALCIUM SERPL-MCNC: 9 MG/DL (ref 8.5–10.5)
CHLORIDE BLD-SCNC: 98 MMOL/L (ref 98–107)
CHOLEST SERPL-MCNC: 275 MG/DL
CO2 SERPL-SCNC: 24 MMOL/L (ref 22–31)
CREAT SERPL-MCNC: 1.84 MG/DL (ref 0.6–1.1)
FASTING STATUS PATIENT QL REPORTED: ABNORMAL
GFR SERPL CREATININE-BSD FRML MDRD: 27 ML/MIN/1.73M2
GLUCOSE BLD-MCNC: 84 MG/DL (ref 70–125)
HDLC SERPL-MCNC: 100 MG/DL
LDLC SERPL CALC-MCNC: 160 MG/DL
POTASSIUM BLD-SCNC: 3.8 MMOL/L (ref 3.5–5)
SODIUM SERPL-SCNC: 135 MMOL/L (ref 136–145)
TRIGL SERPL-MCNC: 77 MG/DL

## 2022-06-08 PROCEDURE — 83880 ASSAY OF NATRIURETIC PEPTIDE: CPT | Performed by: FAMILY MEDICINE

## 2022-06-08 PROCEDURE — 80061 LIPID PANEL: CPT | Performed by: FAMILY MEDICINE

## 2022-06-08 PROCEDURE — 80048 BASIC METABOLIC PNL TOTAL CA: CPT | Performed by: FAMILY MEDICINE

## 2022-06-08 PROCEDURE — 36415 COLL VENOUS BLD VENIPUNCTURE: CPT | Performed by: FAMILY MEDICINE

## 2022-06-08 PROCEDURE — 99214 OFFICE O/P EST MOD 30 MIN: CPT | Performed by: FAMILY MEDICINE

## 2022-06-08 RX ORDER — OLOPATADINE HYDROCHLORIDE 1 MG/ML
1 SOLUTION/ DROPS OPHTHALMIC 2 TIMES DAILY
Qty: 5 ML | Refills: 11 | Status: SHIPPED | OUTPATIENT
Start: 2022-06-08 | End: 2022-09-21

## 2022-06-08 NOTE — PROGRESS NOTES
Assessment & Plan     Shortness of breath  -Will check a BNP, in addition to this we did review the PFTs which were done showing nothing much from a pathology perspective. Given this I have recommended, again, that she pursue evaluation in the long covid clinic for further evaluation and assessment. If she cannot go here (based on location or other issues) we can have her follow up with pulmonology instead.   - Adult Post Covid Clinic Referral  - B-Type Natriuretic Peptide ([facility] Only)  - B-Type Natriuretic Peptide ([facility] Only)    Other fatigue  -per above, I suspect a lot of her symptoms at this time are from long covid. I would also consider the impact of her pain now that she is no longer on opioids.   - Adult Post Covid Clinic Referral    Allergic conjunctivitis, bilateral  -Itchy and draining, will have her trial some Patanol for her eyes.   - olopatadine (PATANOL) 0.1 % ophthalmic solution  Dispense: 5 mL; Refill: 11    Palpitations  -Given the increase of her pulse seen on her fitbit will have her undergo evaluation with an event monitor to make sure she is not having an arrhythmia.   - Adult Cardiac Event Monitor    CKD (chronic kidney disease) stage 4, GFR 15-29 ml/min (H)  -has been stable, updating today  - Basic metabolic panel    Dyslipidemia, goal LDL below 70  -Has been elevated even with Praluent, will update levels today  - Lipid panel reflex to direct LDL Fasting                   No follow-ups on file.    Caitlyn Rees MD  [facility] ROMY Delgado is a 81 year old who presents for the following health issues     History of Present Illness       Reason for visit:  Follow up report on pulmonary function and bloodwork for kidney function and cholesterol  Symptom onset:  3-4 weeks ago  Symptom intensity:  Moderate  Symptom progression:  Worsening  Had these symptoms before:  Yes  Has tried/received treatment for these symptoms:  No  What makes it worse:   Activity  What makes it better:  Laying down    She eats 2-3 servings of fruits and vegetables daily.She consumes 0 sweetened beverage(s) daily.She exercises with enough effort to increase her heart rate 9 or less minutes per day.  She exercises with enough effort to increase her heart rate 3 or less days per week. She is missing 7 dose(s) of medications per week.       She continues to feel like she is having a hard time breathing and feels light headed. She did have PFTs yesterday and they were normal. She will feel like she is gasping for air at times. She does have it happen off and on. She doesn't know if she needs a nebulizer, but used to have one. She will do something and then within 10 minutes will have to sit down as she is light headed. She is tired all the time as well.     She is fed up with her pain. The injections didn't work. She has to get a back brace at the hospital as well. She is sleeping from 2-4 hours and then is up. Last night she was up at 230. By 8 AM she will go to sleep. She will take a nap or rest or read, or fall asleep in the afternoon for an hour then will wake up, take her medicine between 9-10 and then sleep for a few hours. She does take the venlafaxine in the morning. She typically takes the second Adderall between 11-1.     She did start back on methocarbamol 1000 mg and the venlafaxine. She had an MRI at [facility] and they have to redo it as she was unable to hold still. She is doing another one again tomorrow and is going to take Valium and more muscle relaxer.     She does note that her memory has been off as well. She does frequently lose things, she does think that this is due to her being so tired and then not thinking right.     Her pulse has been quite a bit higher as well. She does have it go over 100 with any activity.     Review of Systems   Per above      Objective    /58   Pulse 77   Temp 98.4  F (36.9  C)   Resp 16   Wt 71.2 kg (157 lb)   LMP  (LMP Unknown)    SpO2 98%   BMI 27.81 kg/m    Body mass index is 27.81 kg/m .  Physical Exam   GENERAL: healthy, alert and no distress  NECK: no adenopathy, no asymmetry, masses, or scars and thyroid normal to palpation  RESP: lungs clear to auscultation - no rales, rhonchi or wheezes  CV: regular rate and rhythm, normal S1 S2, no S3 or S4, no murmur, click or rub, no peripheral edema and peripheral pulses strong  MS: no gross musculoskeletal defects noted, no edema

## 2022-06-08 NOTE — PATIENT INSTRUCTIONS
For your labs, since you've eaten so little today, we'll do them.     For your sleep, let's have you trial taking the afternoon Adderall a little later and see if this will help prevent you from napping.     For your palpitations I have the event monitor in for you, they should call you to get that scheduled in the next few days. I ordered this for 14 days to make sure we don't miss something.     For your blood pressure, we'll keep you on your medications for now while we do more work up. Of course it is good today here.

## 2022-06-08 NOTE — LETTER
June 9, 2022      Sandy Spencer  [address] [place] [address]        Dear ,    We are writing to inform you of your test results.    I put a test in for heart failure with your breathing issues and this was normal.     Your cholesterol is still quite high though. I do think that you should consider meeting with cardiology about other treatment options.     Lastly, your sodium is a little low again and your kidneys are a tad worse again. These two things make me suspect that you aren't eating well. Please  make sure you are eating well and drinking well. Hopefully the covid clinic can help with how you are feeling.     Resulted Orders   Basic metabolic panel   Result Value Ref Range    Sodium 135 (L) 136 - 145 mmol/L    Potassium 3.8 3.5 - 5.0 mmol/L    Chloride 98 98 - 107 mmol/L    Carbon Dioxide (CO2) 24 22 - 31 mmol/L    Anion Gap 13 5 - 18 mmol/L    Urea Nitrogen 37 (H) 8 - 28 mg/dL    Creatinine 1.84 (H) 0.60 - 1.10 mg/dL    Calcium 9.0 8.5 - 10.5 mg/dL    Glucose 84 70 - 125 mg/dL    GFR Estimate 27 (L) >60 mL/min/1.73m2      Comment:      Effective December 21, 2021 eGFRcr in adults is calculated using the 2021 CKD-EPI creatinine equation which includes age and gender (Soni et al., NEJM, DOI: 10.1056/JQKUzj0867045)   Lipid panel reflex to direct LDL Fasting   Result Value Ref Range    Cholesterol 275 (H) <=199 mg/dL    Triglycerides 77 <=149 mg/dL    Direct Measure  >=50 mg/dL      Comment:      HDL Cholesterol Reference Range:     0-2 years:   No reference ranges established for patients under 2 years old  at [employer] for lipid analytes.    2-8 years:  Greater than 45 mg/dL     18 years and older:   Female: Greater than or equal to 50 mg/dL   Male:   Greater than or equal to 40 mg/dL    LDL Cholesterol Calculated 160 (H) <=129 mg/dL    Patient Fasting > 8hrs? Unknown    B-Type Natriuretic Peptide ([facility] Only)   Result Value Ref Range    BNP 39 0 - 151 pg/mL        If you have any questions or concerns, please call the clinic at the number listed above.       Sincerely,      Caitlyn Rees MD

## 2022-06-10 ENCOUNTER — TELEPHONE (OUTPATIENT)
Dept: PHYSICAL MEDICINE AND REHAB | Facility: CLINIC | Age: 82
End: 2022-06-10

## 2022-06-10 ENCOUNTER — TRANSFERRED RECORDS (OUTPATIENT)
Dept: HEALTH INFORMATION MANAGEMENT | Facility: CLINIC | Age: 82
End: 2022-06-10
Payer: MEDICARE

## 2022-06-11 SDOH — SOCIAL STABILITY: SOCIAL NETWORK: I HAVE TROUBLE DOING ALL OF THE FAMILY ACTIVITIES THAT I WANT TO DO: SOMETIMES

## 2022-06-11 SDOH — SOCIAL STABILITY: SOCIAL NETWORK: I HAVE TROUBLE DOING ALL OF MY REGULAR LEISURE ACTIVITIES WITH OTHERS: SOMETIMES

## 2022-06-11 SDOH — SOCIAL STABILITY: SOCIAL NETWORK: I HAVE TROUBLE DOING ALL OF THE ACTIVITIES WITH FRIENDS THAT I WANT TO DO: SOMETIMES

## 2022-06-11 SDOH — SOCIAL STABILITY: SOCIAL NETWORK

## 2022-06-11 SDOH — SOCIAL STABILITY: SOCIAL NETWORK: PROMIS ABILITY TO PARTICIPATE IN SOCIAL ROLES & ACTIVITIES T-SCORE: 43.2

## 2022-06-11 SDOH — SOCIAL STABILITY: SOCIAL NETWORK: I HAVE TROUBLE DOING ALL OF MY USUAL WORK (INCLUDE WORK AT HOME): USUALLY

## 2022-06-11 ASSESSMENT — ENCOUNTER SYMPTOMS
WEIGHT LOSS: 0
ABDOMINAL PAIN: 1
FLANK PAIN: 0
HEADACHES: 1
ALTERED TEMPERATURE REGULATION: 1
NERVOUS/ANXIOUS: 1
COUGH: 1
SINUS PAIN: 1
POOR WOUND HEALING: 0
SINUS CONGESTION: 1
HOARSE VOICE: 1
NIGHT SWEATS: 0
HEMOPTYSIS: 0
HEARTBURN: 0
TASTE DISTURBANCE: 0
WHEEZING: 0
INSOMNIA: 1
JAUNDICE: 0
COUGH DISTURBING SLEEP: 0
SYNCOPE: 0
FEVER: 0
DECREASED APPETITE: 0
MUSCLE WEAKNESS: 1
STIFFNESS: 1
TROUBLE SWALLOWING: 1
PANIC: 1
DECREASED CONCENTRATION: 1
SWOLLEN GLANDS: 0
HEMATURIA: 0
DIARRHEA: 1
BLOATING: 1
ORTHOPNEA: 0
LEG PAIN: 0
RECTAL PAIN: 0
CHILLS: 1
BLOOD IN STOOL: 0
NUMBNESS: 0
NECK MASS: 0
PALPITATIONS: 1
ARTHRALGIAS: 1
SKIN CHANGES: 0
SMELL DISTURBANCE: 0
POLYDIPSIA: 0
HOT FLASHES: 0
DIFFICULTY URINATING: 0
POLYPHAGIA: 1
LIGHT-HEADEDNESS: 1
SLEEP DISTURBANCES DUE TO BREATHING: 1
WEIGHT GAIN: 1
SPEECH CHANGE: 0
SHORTNESS OF BREATH: 1
SORE THROAT: 1
EYE PAIN: 0
SNORES LOUDLY: 1
HYPOTENSION: 0
DECREASED LIBIDO: 0
HYPERTENSION: 1
EYE WATERING: 0
NAUSEA: 0
DISTURBANCES IN COORDINATION: 1
TREMORS: 0
EXERCISE INTOLERANCE: 1
EYE IRRITATION: 1
NECK PAIN: 1
WEAKNESS: 1
CONSTIPATION: 1
LOSS OF CONSCIOUSNESS: 0
MEMORY LOSS: 1
BRUISES/BLEEDS EASILY: 1
JOINT SWELLING: 1
INCREASED ENERGY: 1
SEIZURES: 0
EYE REDNESS: 1
BOWEL INCONTINENCE: 1
TINGLING: 1
HALLUCINATIONS: 0
NAIL CHANGES: 1
MUSCLE CRAMPS: 1
PARALYSIS: 0
POSTURAL DYSPNEA: 0
VOMITING: 0
BACK PAIN: 1
DYSPNEA ON EXERTION: 1
DYSURIA: 0
FATIGUE: 1
DEPRESSION: 1
MYALGIAS: 1
DIZZINESS: 1
DOUBLE VISION: 0
SPUTUM PRODUCTION: 1

## 2022-06-11 ASSESSMENT — ANXIETY QUESTIONNAIRES
GAD7 TOTAL SCORE: 9
5. BEING SO RESTLESS THAT IT IS HARD TO SIT STILL: SEVERAL DAYS
GAD7 TOTAL SCORE: 9
8. IF YOU CHECKED OFF ANY PROBLEMS, HOW DIFFICULT HAVE THESE MADE IT FOR YOU TO DO YOUR WORK, TAKE CARE OF THINGS AT HOME, OR GET ALONG WITH OTHER PEOPLE?: VERY DIFFICULT
GAD7 TOTAL SCORE: 9
6. BECOMING EASILY ANNOYED OR IRRITABLE: NEARLY EVERY DAY
7. FEELING AFRAID AS IF SOMETHING AWFUL MIGHT HAPPEN: NOT AT ALL
7. FEELING AFRAID AS IF SOMETHING AWFUL MIGHT HAPPEN: NOT AT ALL
3. WORRYING TOO MUCH ABOUT DIFFERENT THINGS: SEVERAL DAYS
4. TROUBLE RELAXING: SEVERAL DAYS
1. FEELING NERVOUS, ANXIOUS, OR ON EDGE: MORE THAN HALF THE DAYS
2. NOT BEING ABLE TO STOP OR CONTROL WORRYING: SEVERAL DAYS

## 2022-06-13 ENCOUNTER — PATIENT OUTREACH (OUTPATIENT)
Dept: CARE COORDINATION | Facility: CLINIC | Age: 82
End: 2022-06-13
Payer: MEDICARE

## 2022-06-13 PROBLEM — Z90.5 S/P NEPHRECTOMY: Status: ACTIVE | Noted: 2020-10-26

## 2022-06-13 PROBLEM — G44.309 POST-TRAUMATIC HEADACHE: Status: ACTIVE | Noted: 2021-04-19

## 2022-06-13 PROBLEM — G43.719 INTRACTABLE CHRONIC MIGRAINE WITHOUT AURA: Status: ACTIVE | Noted: 2021-07-14

## 2022-06-13 PROBLEM — M54.81 OCCIPITAL NEURALGIA: Status: ACTIVE | Noted: 2019-10-03

## 2022-06-13 PROBLEM — H02.836: Status: RESOLVED | Noted: 2017-12-08 | Resolved: 2022-06-13

## 2022-06-13 PROBLEM — R31.9 HEMATURIA: Status: ACTIVE | Noted: 2019-02-07

## 2022-06-13 PROBLEM — M23.309 DERANGEMENT OF MENISCUS: Status: ACTIVE | Noted: 2018-05-10

## 2022-06-13 PROBLEM — F11.20 OPIOID TYPE DEPENDENCE, CONTINUOUS (H): Status: RESOLVED | Noted: 2019-01-26 | Resolved: 2022-06-13

## 2022-06-13 PROBLEM — M25.561 PAIN IN RIGHT KNEE: Status: ACTIVE | Noted: 2018-04-11

## 2022-06-13 PROBLEM — R80.9 PROTEINURIA: Status: ACTIVE | Noted: 2020-10-26

## 2022-06-13 PROBLEM — M79.7 FIBROSITIS OF NECK: Status: ACTIVE | Noted: 2021-04-19

## 2022-06-13 PROBLEM — G57.00 SCIATIC NEUROPATHY: Status: ACTIVE | Noted: 2018-04-11

## 2022-06-13 PROBLEM — M79.605 LEG PAIN, BILATERAL: Status: ACTIVE | Noted: 2021-12-17

## 2022-06-13 PROBLEM — M79.604 LEG PAIN, BILATERAL: Status: ACTIVE | Noted: 2021-12-17

## 2022-06-13 RX ORDER — BUTALBITAL, ASPIRIN, AND CAFFEINE 325; 50; 40 MG/1; MG/1; MG/1
CAPSULE ORAL
COMMUNITY
Start: 2021-06-28 | End: 2022-06-29

## 2022-06-13 RX ORDER — LISINOPRIL 10 MG/1
10 TABLET ORAL DAILY
COMMUNITY
Start: 2022-02-04 | End: 2022-07-01

## 2022-06-13 NOTE — LETTER
[facility] CARE COORDINATION  [address]  [place] [address]    June 13, 2022    Sandy Spencer  [address] [place] [address]      Dear Sandy,    I have been attempting to reach you since our last contact. I would like to continue to work with you and provide any additional support you may need on achieving your health care related goals. I would appreciate if you would give me a call at [phone] to let me know if you would like to continue working together. I know that there are many things that can affect our ability to communicate and I hope we can continue to work together.    All of us at the [facility] are invested in your health and are here to assist you in meeting your goals.     Sincerely,  Kaitlyn PLEITEZ  Community Health Worker  [facility] Care Coordination  [email or web address]  [email or web address]  Office: [phone]

## 2022-06-13 NOTE — PROGRESS NOTES
Clinic Care Coordination Contact  [facility]/Voicemail       Clinical Data: Care Coordinator Outreach  Outreach attempted x 2.  Left message on patient's voicemail with call back information and requested return call.  Plan: Care Coordinator will send unable to contact letter with care coordinator contact information via [employer]. Care Coordinator will try to reach patient again in 1 month.  7/14/2022

## 2022-06-14 ENCOUNTER — VIRTUAL VISIT (OUTPATIENT)
Dept: PHYSICAL MEDICINE AND REHAB | Facility: CLINIC | Age: 82
End: 2022-06-14
Attending: FAMILY MEDICINE
Payer: MEDICARE

## 2022-06-14 ENCOUNTER — TELEPHONE (OUTPATIENT)
Dept: CARDIOLOGY | Facility: CLINIC | Age: 82
End: 2022-06-14

## 2022-06-14 ENCOUNTER — HOSPITAL ENCOUNTER (OUTPATIENT)
Dept: CARDIOLOGY | Facility: CLINIC | Age: 82
Discharge: HOME | End: 2022-06-14
Attending: FAMILY MEDICINE
Payer: MEDICARE

## 2022-06-14 ENCOUNTER — LAB (OUTPATIENT)
Dept: LAB | Facility: CLINIC | Age: 82
End: 2022-06-14
Attending: FAMILY MEDICINE
Payer: MEDICARE

## 2022-06-14 DIAGNOSIS — R53.83 OTHER FATIGUE: ICD-10-CM

## 2022-06-14 DIAGNOSIS — U09.9 POST-COVID CHRONIC DYSPNEA: ICD-10-CM

## 2022-06-14 DIAGNOSIS — R13.10 DYSPHAGIA, UNSPECIFIED TYPE: ICD-10-CM

## 2022-06-14 DIAGNOSIS — R49.0 VOICE HOARSENESS: ICD-10-CM

## 2022-06-14 DIAGNOSIS — R06.02 SHORTNESS OF BREATH: ICD-10-CM

## 2022-06-14 DIAGNOSIS — G93.32 POST-COVID CHRONIC FATIGUE: ICD-10-CM

## 2022-06-14 DIAGNOSIS — R41.840 POST-COVID CHRONIC CONCENTRATION DEFICIT: ICD-10-CM

## 2022-06-14 DIAGNOSIS — U09.9 LONG COVID: Primary | ICD-10-CM

## 2022-06-14 DIAGNOSIS — U09.9 POST-COVID CHRONIC FATIGUE: ICD-10-CM

## 2022-06-14 DIAGNOSIS — R06.09 POST-COVID CHRONIC DYSPNEA: ICD-10-CM

## 2022-06-14 DIAGNOSIS — G47.9 SLEEP DISTURBANCE: ICD-10-CM

## 2022-06-14 DIAGNOSIS — R00.2 PALPITATIONS: ICD-10-CM

## 2022-06-14 DIAGNOSIS — R23.3 EASY BRUISING: ICD-10-CM

## 2022-06-14 DIAGNOSIS — D64.9 ANEMIA, UNSPECIFIED TYPE: ICD-10-CM

## 2022-06-14 DIAGNOSIS — U09.9 POST-COVID CHRONIC CONCENTRATION DEFICIT: ICD-10-CM

## 2022-06-14 LAB
BASOPHILS # BLD AUTO: 0 10E3/UL (ref 0–0.2)
BASOPHILS NFR BLD AUTO: 0 %
EOSINOPHIL # BLD AUTO: 0.2 10E3/UL (ref 0–0.7)
EOSINOPHIL NFR BLD AUTO: 2 %
ERYTHROCYTE [DISTWIDTH] IN BLOOD BY AUTOMATED COUNT: 14.3 % (ref 10–15)
FERRITIN SERPL-MCNC: 230 NG/ML (ref 10–130)
HCT VFR BLD AUTO: 36.2 % (ref 35–47)
HGB BLD-MCNC: 11.7 G/DL (ref 11.7–15.7)
IMM GRANULOCYTES # BLD: 0 10E3/UL
IMM GRANULOCYTES NFR BLD: 0 %
LYMPHOCYTES # BLD AUTO: 2.1 10E3/UL (ref 0.8–5.3)
LYMPHOCYTES NFR BLD AUTO: 32 %
MCH RBC QN AUTO: 30.5 PG (ref 26.5–33)
MCHC RBC AUTO-ENTMCNC: 32.3 G/DL (ref 31.5–36.5)
MCV RBC AUTO: 94 FL (ref 78–100)
MONOCYTES # BLD AUTO: 0.7 10E3/UL (ref 0–1.3)
MONOCYTES NFR BLD AUTO: 10 %
NEUTROPHILS # BLD AUTO: 3.7 10E3/UL (ref 1.6–8.3)
NEUTROPHILS NFR BLD AUTO: 56 %
NRBC # BLD AUTO: 0 10E3/UL
NRBC BLD AUTO-RTO: 0 /100
PLATELET # BLD AUTO: 191 10E3/UL (ref 150–450)
RBC # BLD AUTO: 3.84 10E6/UL (ref 3.8–5.2)
TSH SERPL DL<=0.005 MIU/L-ACNC: 0.6 UIU/ML (ref 0.3–5)
VIT B12 SERPL-MCNC: 396 PG/ML (ref 213–816)
WBC # BLD AUTO: 6.8 10E3/UL (ref 4–11)

## 2022-06-14 PROCEDURE — 84443 ASSAY THYROID STIM HORMONE: CPT

## 2022-06-14 PROCEDURE — 85025 COMPLETE CBC W/AUTO DIFF WBC: CPT

## 2022-06-14 PROCEDURE — 99417 PROLNG OP E/M EACH 15 MIN: CPT | Mod: 95 | Performed by: PHYSICIAN ASSISTANT

## 2022-06-14 PROCEDURE — 82728 ASSAY OF FERRITIN: CPT

## 2022-06-14 PROCEDURE — 93270 REMOTE 30 DAY ECG REV/REPORT: CPT

## 2022-06-14 PROCEDURE — 82607 VITAMIN B-12: CPT

## 2022-06-14 PROCEDURE — 99205 OFFICE O/P NEW HI 60 MIN: CPT | Mod: 95 | Performed by: PHYSICIAN ASSISTANT

## 2022-06-14 PROCEDURE — 83550 IRON BINDING TEST: CPT

## 2022-06-14 PROCEDURE — 36415 COLL VENOUS BLD VENIPUNCTURE: CPT

## 2022-06-14 ASSESSMENT — ENCOUNTER SYMPTOMS
HALLUCINATIONS: 0
TROUBLE SWALLOWING: 1
EYE PAIN: 0
EYE REDNESS: 1
VOMITING: 0
PALPITATIONS: 1
CONSTIPATION: 1
NECK PAIN: 1
HEMATURIA: 0
HEARTBURN: 0
SINUS PAIN: 1
BRUISES/BLEEDS EASILY: 1
ARTHRALGIAS: 1
ABDOMINAL PAIN: 1
POLYDIPSIA: 0
FATIGUE: 1
WHEEZING: 0
DIZZINESS: 1
TREMORS: 0
JOINT SWELLING: 1
SORE THROAT: 1
RECTAL PAIN: 0
WEAKNESS: 1
SEIZURES: 0
DIFFICULTY URINATING: 0
FLANK PAIN: 0
MYALGIAS: 1
LIGHT-HEADEDNESS: 1
COUGH: 1
DIARRHEA: 1
HEADACHES: 1
POLYPHAGIA: 1
DYSURIA: 0
SHORTNESS OF BREATH: 1
CHILLS: 1
FEVER: 0
NERVOUS/ANXIOUS: 1
DECREASED CONCENTRATION: 1
NUMBNESS: 0
BACK PAIN: 1
NAUSEA: 0

## 2022-06-14 NOTE — PATIENT INSTRUCTIONS
Post COVID Self Care Suggestions:     Fatigue Management:       https://www.archives-pmr.org/action/showPdf?djn=-4696%2819%2849862-4       Self Care:      [email or web address]  Recovery World Health Organization:    https://apps.who.int/iris/Nichetream/handle/21506/275042/CMZ-IDTD-1162-413-29960-72400-eng.pdf  Breathing exercises:    [email or web address]-recovery-breathing-exercises   Follow up with your Cardiologist regarding your cardiac symptoms   For Shortness of  breath please see ENT and Pulmonary rehab to help with your breathing

## 2022-06-14 NOTE — TELEPHONE ENCOUNTER
----- Message from Rufina Montenegro RN sent at 6/14/2022  2:25 PM CDT -----    ----- Message -----  From: Zeinab Butler  Sent: 6/14/2022   2:22 PM CDT  To: Rufina Montenegro RN    General phone call: PT PCP said that her LDL's were too high.  Pt is also experienceing SOB often, a lot of light headedness and fatigue.  Pt would like a call to discuss this. Pt has palpitations and she says that she has pinching feeling underneath her breast where she thinks her heart is.      Caller: Kerry  Primary cardiologist: Amada  Detailed reason for call: see above  New or active symptoms? yes  Best phone number: [phone]  Best time to contact: please call after 4  Ok to leave a detailedmessage? yes  Device? no    Additional Info:
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
## 2022-06-15 ENCOUNTER — TELEPHONE (OUTPATIENT)
Dept: SLEEP MEDICINE | Facility: CLINIC | Age: 82
End: 2022-06-15
Payer: MEDICARE

## 2022-06-15 ENCOUNTER — TELEPHONE (OUTPATIENT)
Dept: CARDIOLOGY | Facility: CLINIC | Age: 82
End: 2022-06-15
Payer: MEDICARE

## 2022-06-15 DIAGNOSIS — E78.5 DYSLIPIDEMIA, GOAL LDL BELOW 70: ICD-10-CM

## 2022-06-15 DIAGNOSIS — I25.10 CORONARY ARTERY DISEASE INVOLVING NATIVE CORONARY ARTERY OF NATIVE HEART WITHOUT ANGINA PECTORIS: ICD-10-CM

## 2022-06-15 LAB
IRON SATN MFR SERPL: 23 % (ref 15–46)
IRON SERPL-MCNC: 86 UG/DL (ref 35–180)
TIBC SERPL-MCNC: 371 UG/DL (ref 240–430)

## 2022-06-15 NOTE — TELEPHONE ENCOUNTER
Attempted to reach patient via phone unsuccessful left message there's sooner availability and will add to the wait list.         Buck Merino [facility]

## 2022-06-15 NOTE — TELEPHONE ENCOUNTER
"Spoke with patient regarding        \"----- Message -----   From: Zeinab Butler   Sent: 6/14/2022   2:22 PM CDT   To: Rufina Montenegro RN     General phone call: PT PCP said that her LDL's were too high.  Pt is also experienceing SOB often, a lot of light headedness and fatigue.  Pt would like a call to discuss this. Pt has palpitations and she says that she has pinching feeling underneath her breast where she thinks her heart is.       Caller: Kerry   Primary cardiologist: Amada   Detailed reason for call: see above   New or active symptoms? yes   Best phone number: [phone]   Best time to contact: please call after 4   Ok to leave a detailedmessage? yes   Device? no \"      Pt states still having symptoms but d/t covid has been having lingering symptoms had labs drawn with pcp and ldls elevated, pcp referred patient back to Dr. Ybarra for management. Advised patient would refer intake to provider for any recommendations. xferred to scheduling for securing a follow up appointment.   "

## 2022-06-15 NOTE — TELEPHONE ENCOUNTER
Reason for call:  Other   Patient called regarding (reason for call):   Potential Urgent appointment    Additional comments:   Patient is in Renal failure, please see chart. Please consider a sooner appointment if possible with any appropriate provider. Per PT she is taking a high dose of Trazadone per night and only sleeps 3 hours a night.     Phone number to reach patient:  Cell number on file:    Telephone Information:   Mobile [phone]       Best Time:  any    Can we leave a detailed message on this number?  YES    Travel screening: Not Applicable

## 2022-06-16 DIAGNOSIS — R06.02 SOB (SHORTNESS OF BREATH): ICD-10-CM

## 2022-06-16 DIAGNOSIS — R06.09 POST-COVID CHRONIC DYSPNEA: Primary | ICD-10-CM

## 2022-06-16 DIAGNOSIS — U09.9 POST-COVID CHRONIC DYSPNEA: Primary | ICD-10-CM

## 2022-06-16 NOTE — TELEPHONE ENCOUNTER
Patient called back, she said that Dr Ybarra called her last evening and left a VM for her to call back to speak to a nurse   Please call patient back today at [phone] (home)   PT phone will push call to  if I the call comes from a number she does not have saved. Which is what happened last evening with Dr Ybarra's call.  If nurse get VM please leave pt a direct number she may call back on

## 2022-06-17 RX ORDER — ALIROCUMAB 75 MG/ML
140 INJECTION, SOLUTION SUBCUTANEOUS
Qty: 12 ML | Refills: 4 | Status: SHIPPED | OUTPATIENT
Start: 2022-06-17 | End: 2022-06-27 | Stop reason: ALTCHOICE

## 2022-06-17 NOTE — TELEPHONE ENCOUNTER
From: Luz Elena Ybarra MD  Sent: 6/17/2022   1:57 PM CDT  To: Hannaarabella Hastings    I called the patient and discussed her medical issues.  She could not tolerate to statins.  She is on Praluent 75 mg injection every 2 weeks.  Her LDL was 160.  Please increase Praluent to 140 mg injection every 2 weeks, repeat lipid profile in 2 months.    She is on 2 weeks event monitor.  If there is no significant findings from event monitor, then consider ischemic evaluation.    The patient agreed with the plan.  Thanks  Wilbert

## 2022-06-17 NOTE — TELEPHONE ENCOUNTER
" increased Praluent to 140 mg injection every 2 weeks, repeat lipid profile in 2 months ordered per Dr. Ybarra's instructions    pt is on 2 weeks event monitor. Per Dr. Ybarra \"If there is no significant findings from event monitor, then consider ischemic evaluation\". -JUAN    "

## 2022-06-21 ENCOUNTER — DOCUMENTATION ONLY (OUTPATIENT)
Dept: PHYSICAL MEDICINE AND REHAB | Facility: CLINIC | Age: 82
End: 2022-06-21
Payer: MEDICARE

## 2022-06-21 NOTE — PROGRESS NOTES
Initial Visit Date: 6/14/22   Provider:  Donald   Followup Requested STATUS DETAILS   Original Provider     LAB/Imaging     Refferals STATUS    Cardiology Scheduled 7/13/22    ENT  Scheduled 7/14/22    Pulmonary Medicine Scheduled 8/3/22    Pulmonary Rehab Scheduled 8/15/22    Sleep Eval Scheduled 10/5/22    OT and Speech Therapy Ordered Appointments were scheduled then cancelled vis MyChart (car problems)     Follow-up with Donald scheduled 9/19/22.    Eddie Aguirre

## 2022-06-22 DIAGNOSIS — M54.16 LUMBAR RADICULOPATHY: ICD-10-CM

## 2022-06-22 RX ORDER — DEXTROAMPHETAMINE SACCHARATE, AMPHETAMINE ASPARTATE, DEXTROAMPHETAMINE SULFATE AND AMPHETAMINE SULFATE 5; 5; 5; 5 MG/1; MG/1; MG/1; MG/1
20 TABLET ORAL 2 TIMES DAILY
Qty: 60 TABLET | Refills: 0 | Status: SHIPPED | OUTPATIENT
Start: 2022-06-24 | End: 2022-06-29

## 2022-06-22 NOTE — TELEPHONE ENCOUNTER
Received call from patient requesting refill(s) of Adderall     Last dispensed from pharmacy on 5/25/22    Patient's last office/virtual visit by prescribing provider on 5/4/22  Next office/virtual appointment scheduled for 6/29/22    Last urine drug screen date 5/2022  Current opioid agreement on file (completed within the last year) Yes Date of opioid agreement: 5/2022    E-prescribe to [facility] pharmacy  Pending Prescriptions:                       Disp   Refills    amphetamine-dextroamphetamine (ADDERALL) *60 tab*0            Sig: Take 1 tablet (20 mg) by mouth 2 times daily May           fill 6/22/22 to start 6/24/22

## 2022-06-27 ENCOUNTER — TELEPHONE (OUTPATIENT)
Dept: CARDIOLOGY | Facility: CLINIC | Age: 82
End: 2022-06-27

## 2022-06-27 DIAGNOSIS — I25.10 CORONARY ARTERY DISEASE INVOLVING NATIVE CORONARY ARTERY OF NATIVE HEART WITHOUT ANGINA PECTORIS: ICD-10-CM

## 2022-06-27 DIAGNOSIS — E78.5 DYSLIPIDEMIA, GOAL LDL BELOW 70: ICD-10-CM

## 2022-06-28 NOTE — TELEPHONE ENCOUNTER
FUTURE VISIT INFORMATION      FUTURE VISIT INFORMATION:    Date: 9/12/22    Time: 10:00am    [place] [facility] [place]  REFERRAL INFORMATION:    Referring provider:  OFELIA Bennett    Referring providers clinic:  [facility]    Reason for visit/diagnosis  Voice hoarseness     RECORDS REQUESTED FROM:       Clinic name Comments Records Status Imaging Status   [facility] Virtual visit/referral 6/14/22 [facility] ent OV 8/15/19 [facility]

## 2022-06-29 ENCOUNTER — LAB (OUTPATIENT)
Dept: LAB | Facility: HOSPITAL | Age: 82
End: 2022-06-29
Payer: MEDICARE

## 2022-06-29 ENCOUNTER — OFFICE VISIT (OUTPATIENT)
Dept: PALLIATIVE MEDICINE | Facility: OTHER | Age: 82
End: 2022-06-29
Payer: MEDICARE

## 2022-06-29 VITALS
SYSTOLIC BLOOD PRESSURE: 123 MMHG | DIASTOLIC BLOOD PRESSURE: 71 MMHG | WEIGHT: 160 LBS | HEART RATE: 84 BPM | BODY MASS INDEX: 28.34 KG/M2 | OXYGEN SATURATION: 97 %

## 2022-06-29 DIAGNOSIS — M54.16 LUMBAR RADICULOPATHY: Primary | ICD-10-CM

## 2022-06-29 DIAGNOSIS — M75.101 ROTATOR CUFF SYNDROME, RIGHT: ICD-10-CM

## 2022-06-29 DIAGNOSIS — G89.4 CHRONIC PAIN SYNDROME: ICD-10-CM

## 2022-06-29 DIAGNOSIS — M25.542 ARTHRALGIA OF BOTH HANDS: ICD-10-CM

## 2022-06-29 DIAGNOSIS — M54.16 LUMBAR RADICULOPATHY: ICD-10-CM

## 2022-06-29 DIAGNOSIS — M25.541 ARTHRALGIA OF BOTH HANDS: ICD-10-CM

## 2022-06-29 DIAGNOSIS — M19.041 ARTHRITIS OF FINGER OF RIGHT HAND: Primary | ICD-10-CM

## 2022-06-29 DIAGNOSIS — M19.041 ARTHRITIS OF FINGER OF RIGHT HAND: ICD-10-CM

## 2022-06-29 LAB — URATE SERPL-MCNC: 8.2 MG/DL (ref 2–7.5)

## 2022-06-29 PROCEDURE — 84550 ASSAY OF BLOOD/URIC ACID: CPT

## 2022-06-29 PROCEDURE — G0463 HOSPITAL OUTPT CLINIC VISIT: HCPCS

## 2022-06-29 PROCEDURE — 99214 OFFICE O/P EST MOD 30 MIN: CPT | Performed by: ANESTHESIOLOGY

## 2022-06-29 PROCEDURE — 36415 COLL VENOUS BLD VENIPUNCTURE: CPT

## 2022-06-29 RX ORDER — CALCIUM CARBONATE/VITAMIN D3 500-10/5ML
1 LIQUID (ML) ORAL 3 TIMES DAILY
Qty: 90 CAPSULE | Refills: 3 | Status: SHIPPED | OUTPATIENT
Start: 2022-06-29 | End: 2022-07-29

## 2022-06-29 RX ORDER — DEXTROAMPHETAMINE SACCHARATE, AMPHETAMINE ASPARTATE, DEXTROAMPHETAMINE SULFATE AND AMPHETAMINE SULFATE 5; 5; 5; 5 MG/1; MG/1; MG/1; MG/1
20 TABLET ORAL 2 TIMES DAILY
Qty: 60 TABLET | Refills: 0 | Status: SHIPPED | OUTPATIENT
Start: 2022-06-29 | End: 2022-07-26

## 2022-06-29 RX ORDER — ZINC GLUCONATE 50 MG
50 TABLET ORAL DAILY
Qty: 30 TABLET | Refills: 3 | Status: SHIPPED | OUTPATIENT
Start: 2022-06-29 | End: 2022-08-17

## 2022-06-29 RX ORDER — ALLOPURINOL 100 MG/1
100 TABLET ORAL 2 TIMES DAILY
Qty: 60 TABLET | Refills: 1 | Status: SHIPPED | OUTPATIENT
Start: 2022-06-29 | End: 2022-07-29

## 2022-06-29 RX ORDER — ZONISAMIDE 25 MG/1
50 CAPSULE ORAL AT BEDTIME
Qty: 60 CAPSULE | Refills: 3 | Status: SHIPPED | OUTPATIENT
Start: 2022-06-29 | End: 2022-11-17

## 2022-06-29 ASSESSMENT — PAIN SCALES - GENERAL: PAINLEVEL: MODERATE PAIN (5)

## 2022-06-29 NOTE — NURSING NOTE
Patient presents to the clinic today for a follow up with ARELIS FITZPATRICK MD regarding Pain Management.      PEG Score 2/9/2022 5/4/2022 6/29/2022   PEG Total Score 9 8.67 9           UDS/CSA-= 5/4/22        Medications-Adderall        QUESTIONS:              Mylene [facility] Patient Facilitator

## 2022-06-29 NOTE — PATIENT INSTRUCTIONS
PLAN:    Will go to [facility] laboratory for a uric acid level.    Right shoulder rotator cuff injection ordered to be done when you come to the pain clinic on 7/18.    You may use the delta H Gummies that we discussed today at bedtime.    Zonisamide 25 mg 2 tablets at bedtime to help with sleep, may call after 2 to 3 weeks to discuss response.    Magnesium oxide 400 mg 3 times a day to help with muscle cramps, and has a prescription, if not covered by insurance may obtain over-the-counter.    Zinc 50 mg capsule 1 daily to help with muscle cramps and energy sent as a prescription, may need over-the-counter if not covered by insurance.    You may contact these dietitians to see if they take your insurance can help you with the diet to help with kidney function, Katheryn Hunter [phone], Keiko Velázquez [phone], Magali Chiu [phone], Marianela Long [phone].    Continue the Adderall 20 mg morning and noon.    Referral to acupuncture for back pain    Follow-up with Dr. Lynn in 3 months

## 2022-06-29 NOTE — PROGRESS NOTES
[facility] - Office Visit    ASSESSMENT & PLAN     Snady was seen today for pain.    Diagnoses and all orders for this visit:    Arthritis of finger of right hand  -     Uric acid; Future  -     zonisamide (ZONEGRAN) 25 MG capsule; Take 2 capsules (50 mg) by mouth At Bedtime    Rotator cuff syndrome, right  -     PAIN INJECTION EVAL/TREAT/FOLLOW UP; Future    Chronic pain syndrome  -     magnesium oxide 400 MG CAPS; Take 1 capsule by mouth 3 times daily  -     zinc gluconate 50 MG tablet; Take 1 tablet (50 mg) by mouth daily    Lumbar radiculopathy  -     amphetamine-dextroamphetamine (ADDERALL) 20 MG tablet; Take 1 tablet (20 mg) by mouth 2 times daily May fill 6/20 to start 6/22  -     Acupuncture Referral; Future        Patient Instructions   PLAN:    Will go to [facility] laboratory for a uric acid level.    Right shoulder rotator cuff injection ordered to be done when you come to the pain clinic on 7/18.    You may use the delta H Gummies that we discussed today at bedtime.    Zonisamide 25 mg 2 tablets at bedtime to help with sleep, may call after 2 to 3 weeks to discuss response.    Magnesium oxide 400 mg 3 times a day to help with muscle cramps, and has a prescription, if not covered by insurance may obtain over-the-counter.    Zinc 50 mg capsule 1 daily to help with muscle cramps and energy sent as a prescription, may need over-the-counter if not covered by insurance.    You may contact these dietitians to see if they take your insurance can help you with the diet to help with kidney function, Katheryn Hunter [phone], Keiko Velázquez [phone], Magali Chiu [phone], Marianela Long [phone].    Continue the Adderall 20 mg morning and noon.    Referral to acupuncture for back pain    Follow-up with Dr. Fitzpatrick in 3 months        -----  ARELIS FITZPATRICK MD  [facility]       SUBJECTIVE      Sanyd Spencer is a 81 year old year old female who presents to  "clinic today for the following:     Follow-up for upper extremity chronic regional pain syndrome, migraine headaches.    Reviewing the record is been followed in the [facility] clinic.    She reviews that she is \"miserable\".  Is not sleeping.  She presents compound of \"delta 8\" Gummies that she is gotten from her grandson over the last 3 nights as tried.  The first night she was very tired, was able to sleep 6-1/2 hours which is quite unheard of.  The next couple nights 4-1/2 hours which was still better, did end up using some trazodone.    I reviewed the delta 8 is essentially the same as delta 9 THC and the benefits and side effects could be expected.    She has a copy the MRI sent from [facility] regarding her shoulder, showing some right rotator cuff pathology.  She was given an injection in the shoulder from the back with some benefit and told she should return for an injection from the anterior approach.  She would like to come in to our clinic to do that.  Reviewed in mid July she is scheduled for right upper arm trigger point injections which she had requested and will try to see if that can be done.    She did have an injection in her right index finger PIP.  It continues painful.  Is not particularly red or swollen presently though she notes can be by the end of the day.    She indicates a bruise on her right ankle its been there for a month that she cannot recall the injury is not improving.    She has stopped taking the nortriptyline as she is talked with the [facility] pharmacist and increased methocarbamol 500 mg 2 tablets.    She is working with Dr. Wang's at the spine center, did have a sacroiliac injection which seemed to help that day but not so much in the next and she is wearing a sacroiliac belt.    She reviews concerns for her kidney disease, but does show the creatinine seems to be improving somewhat.    She would like to do acupuncture.    She wonders about some of her muscle cramps and " using magnesium.    She would like to work with dietitians that might help her to protect her kidneys.    Describes Adderall 20 mg twice a day seems to be helping with focus, not helping with energy.  Reviewed that would be expected to stimulant to target symptoms to help with attention and focus not necessarily energy, particular if she is dealing with the long COVID symptoms      Current Outpatient Medications:      alirocumab (PRALUENT) 150 MG/ML injectable pen, Inject 1 mL (150 mg) Subcutaneous every 14 days, Disp: 6 mL, Rfl: 1     amphetamine-dextroamphetamine (ADDERALL) 20 MG tablet, Take 1 tablet (20 mg) by mouth 2 times daily May fill 6/20 to start 6/22, Disp: 60 tablet, Rfl: 0     apixaban ANTICOAGULANT (ELIQUIS) 5 MG tablet, Take 1 tablet (5 mg) by mouth 2 times daily, Disp: 180 tablet, Rfl: 3     azelastine (ASTEPRO) 0.15 % nasal spray, Spray 2 sprays into both nostrils 2 times daily , Disp: , Rfl:      carvedilol (COREG) 6.25 MG tablet, Take 1 tablet (6.25 mg) by mouth 2 times daily (with meals), Disp: 180 tablet, Rfl: 1     Cholecalciferol (VITAMIN D-3) 125 MCG (5000 UT) TABS, Take 5,000 Units by mouth daily, Disp: , Rfl:      HEMP OIL OR EXTRACT OR OTHER CBD CANNABINOID, NOT MEDICAL CANNABIS,, 50 mg Delta 8 Gummies, Disp: , Rfl:      levothyroxine (SYNTHROID/LEVOTHROID) 50 MCG tablet, Take 1 tablet (50 mcg) by mouth daily, Disp: 90 tablet, Rfl: 3     lidocaine (LIDODERM) 5 % patch, Place 1 patch onto the skin every 24 hours To prevent lidocaine toxicity, patient should be patch free for 12 hrs daily., Disp: 30 patch, Rfl: 0     magnesium oxide 400 MG CAPS, Take 1 capsule by mouth 3 times daily, Disp: 90 capsule, Rfl: 3     methocarbamol (ROBAXIN) 500 MG tablet, Take 2 tablets (1,000 mg) by mouth 2 times daily, Disp: 360 tablet, Rfl: 0     olopatadine (PATANOL) 0.1 % ophthalmic solution, Place 1 drop into both eyes 2 times daily, Disp: 5 mL, Rfl: 11     senna (SENOKOT) 8.6 MG tablet, Take 1-3 tablets by  mouth daily , Disp: , Rfl:      traZODone (DESYREL) 100 MG tablet, Take 3-4 tablets (300-400 mg) by mouth At Bedtime (Patient taking differently: Take 200-400 mg by mouth At Bedtime Patient taking 2 tablets oral at HS), Disp: 360 tablet, Rfl: 1     venlafaxine (EFFEXOR XR) 75 MG 24 hr capsule, Take 1 capsule (75 mg) by mouth daily, Disp: 90 capsule, Rfl: 0     zinc gluconate 50 MG tablet, Take 1 tablet (50 mg) by mouth daily, Disp: 30 tablet, Rfl: 3     zonisamide (ZONEGRAN) 25 MG capsule, Take 2 capsules (50 mg) by mouth At Bedtime, Disp: 60 capsule, Rfl: 3     allopurinol (ZYLOPRIM) 100 MG tablet, Take 1 tablet (100 mg) by mouth 2 times daily, Disp: 60 tablet, Rfl: 1     lisinopril (ZESTRIL) 10 MG tablet, Take 10 mg by mouth daily (Patient not taking: Reported on 6/29/2022), Disp: , Rfl:       Review of Systems   General, psych, musculoskeletal, bowels and bladder otherwise normal other than above.          OBJECTIVE   /71   Pulse 84   Wt 72.6 kg (160 lb)   LMP  (LMP Unknown)   SpO2 97%   BMI 28.34 kg/m        Physical Exam  General: Does appear uncomfortable.  Ambulates with cane.  Cardiovascular: Normal rate  Lungs: Pulmonary effort is normal, speaking in full sentences  MSK: Right index , tender to palpation, not particularly swollen or erythematous.  No changes in skin texture, sweating, right upper arm.  Skin: Warm and dry. No concerning rashes or lesions.  Neurologic: No focal deficit, alert and oriented x3  Psychiatric: Constricted affect.  Clear sensorium      Assessment: Chronic regional pain syndrome right upper extremity, migraine headaches.  Diffuse arthralgias.    ADD symptoms.    She has been evaluated for right rotator cuff symptoms, MRI has been scanned in showing some tendinopathy.    She is very concerned what she can do to help with sleep.  We have previously use an SMI for neuropathic pain, tolerated, discussed she could use a higher dose at bedtime and may cautiously continue  with the delta 8.    Recommended uric acid with concern for the arthralgia.    To the time of dictation a uric acid did return elevated at 8.2.  I did leave a message with the patient to work with the dietitians to help with diet approaches for her kidney, to also learn about the purine diet and avoid high fructose corn syrup and other forms of fructose as well.    Total time more than 30 minutes

## 2022-06-30 PROCEDURE — 93228 REMOTE 30 DAY ECG REV/REPORT: CPT | Performed by: INTERNAL MEDICINE

## 2022-06-30 NOTE — PROGRESS NOTES
Medication Therapy Management (MTM) Encounter    ASSESSMENT:                            Insomnia: Appears that her sleep is slightly improved with addition of delta 8, but she is still working on the combination of trazodone and delta 8.  Encouraged her to try to get more delta 8, also delta 9 is now real today in a gummy form and she could try that as well.    Edema:  Patient to continue higher dose of torsemide and follow with nephrology.  We will add a magnesium level since she has had issues with cramps with torsemide in the past.    Hyperlipidemia/PAD: Patient to continue Praluent, she is on the higher dose today.  She plans on getting her lipids checked in a few weeks with cardiology.    Elevated uric acid level: Level on 6/29/2022 is not at goal less than 6.  Encouraged her to continue allopurinol and we will recheck her uric acid level with her future lipid panel, order placed.    Hypertension: BP has been at goal <130/80 mmHg due to CKD.  Continue current regimen.     Chronic pain: Continue to follow with the pain clinic.  She is now off fentanyl.  Patient to continue topical therapies.  Sounds like she may have had some improvement with the methocarbamol, therefore we will increase the dose to 1500 mg twice daily.    Depression: Patient continues to have some anxiety, therefore we will increase her venlafaxine to 150 mg daily.      PLAN:                            1.  increase methocarbamol dose to 1500 mg twice daily.  2.  Increase venlafaxine XR to 150 mg daily   3.  Future uric acid and magnesium level    Follow-up: 1-month phone follow-up     SUBJECTIVE/OBJECTIVE:                          Sandy Spencer is a 81 year old female called for a follow up visit.     Reason for visit: Follow up since we last spoke 5/24/22  Medication Adherence/Access:  She used to have home care through [email or web address] but was discharged.  More recently her friend (an RN) has been setting up her medicines for her. Certain oral  medications goes right through her -- has 9 inches of her colon. Would like that considered for her medications. She is using [facility] for some of her medicines.  Prefers capsules.  She gets Praluent and Elquis through prescription assistance programs. Has spent a lot of money of her medications this year, especially when she was on Fentanyl. She stopped some of her medications due to cost and not wanting to be on things she does not need.     Insomnia: Continues trazodone 0-400 mg nightly and zonisamide 25 mg x 2 (50 mg) nightly.  Zonisamide was started on 6/29/2022 by Dr. Lynn, and she only started it last night.  Reports that she slept fairly well last night.  In general she gets about 5 to 6 hours of sleep, but that includes naps during the day.  She plans on taking the zonisamide along with zinc and magnesium for 2 weeks and then following up with Dr. Lynn.  She was given delta 8 from her granddaughters boyfriend who works in a cannabis shop.  She tried it for 3 nights on Sunday, Monday, Tuesday.  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  On Monday night she did not sleep well and took 2 trazodone's in the middle of the night.  She now no longer has anymore.  She is no longer taking hydroxyzine, clonidine, doxepin or nortriptyline.  History of sleepwalking with Ambien  Mirtazapine was not helpful    Edema: Currently taking torsemide 10 mg twice daily.  She met with associated nephrology and reports that the dose was increased.  Reports that she had gained 8 pounds and it was affecting her pain, the fluid has now come off.  She is closely monitoring her weight at home.    Hyperlipidemia/PAD: Currently taking Praluent 150 mg every 14 days.  No longer on rosuvastatin 40 mg daily due to elevated CK.  She was meeting with rheumatology since her CK level had not  improved without rosuvastatin for a workup, but it is now improved.   Since stopping rosuvastatin she did not notice any change in myalagias, hard to tell since she has chronic pain.   Due to her last lipids checked on 4/8/2022, Dr. Lang increased her Praluent.  She started this already and received to the higher dose in the mail.  Reports that Dr. Ybarra would like to recheck her lipids a day of her next appointment with him on 7/13/2022.  She was approved for the prescription assistance program for Praluent.  Last CK level = 135 on 3/4/22    Elevated uric acid level: Level was checked on 6/29/2022 and was 8.2, Dr. Lynn started her on allopurinol 100 mg twice daily, she only started this yesterday.  Reports that she does not drink any juice, pop, lee, or seafood.  She does have joint pain, and a few times has had a sharp sharp pain in her big toe which resolved.    Hypertension: Continues carvedilol 6.25 mg TWICE DAILY (hold if BP <100/60).   Lisinopril stopped on 2/5/22 due to hypotension.   She does check her blood pressure at home, but we did not have time to discuss in detail today.  Returned her Holter monitor yesterday and received her results.  ECHO on 3/31/22 shows EF 60%  BP Readings from Last 3 Encounters:   06/29/22 123/71   06/08/22 110/58   06/06/22 110/56     Chronic pain: Currently taking methocarbamol 1000 mg twice daily.  Reports that a few times she has taken an extra dose of 500 mg, and wonders about taking a higher dose.   Underwent a bilateral SI joint steroid injection on 5/23/2022  Follows with the pain and spine clinic.   No longer on fentanyl.  Reports the pain is more significant, but she does not want to go back on fentanyl.  No longer using ketamine, but still has at home   Hx of gabapentin (memory and weight gain) and Lyrica (throat closes)    Depression: Currently taking venlafaxine Exar 75 mg daily.  She stopped venlafaxine on her own, and we restarted it on 5/24/2022.  She does think  she still has anxiety and she would like to increase the dose.      Today's Vitals: LMP  (LMP Unknown)   ----------------    Allergies/ADRs: Reviewed in chart  Past Medical History: Reviewed in chart  Tobacco: She reports that she quit smoking about 21 years ago. She has a 20.00 pack-year smoking history. She has never used smokeless tobacco.  Alcohol: Reviewed in chart    I spent 55 minutes with this patient today. All changes were made via collaborative practice agreement with Caitlyn Rees MD. A copy of the visit note was provided to the patient's primary care provider.    The patient declined a summary of these recommendations.     Darlin Elise, Pharm.D., [facility]   Medication Therapy Management Pharmacist   [facility]     Telemedicine Visit Details  Type of service:  Telephone visit  Start Time: 11:37 AM  End Time: 12:32 PM  Originating Location (patient location): Home  Distant Location (provider location):  [facility]     Medication Therapy Recommendations  Chronic pain    Current Medication: methocarbamol (ROBAXIN) 500 MG tablet (Discontinued)   Rationale: Dose too low - Dosage too low - Effectiveness   Recommendation: Increase Dose   Status: Accepted per Provider         Major depression    Current Medication: venlafaxine (EFFEXOR XR) 75 MG 24 hr capsule (Discontinued)   Rationale: Dose too low - Dosage too low - Effectiveness   Recommendation: Increase Dose   Status: Accepted per CPA

## 2022-07-01 ENCOUNTER — TELEPHONE (OUTPATIENT)
Dept: OTOLARYNGOLOGY | Facility: CLINIC | Age: 82
End: 2022-07-01

## 2022-07-01 ENCOUNTER — VIRTUAL VISIT (OUTPATIENT)
Dept: PHARMACY | Facility: CLINIC | Age: 82
End: 2022-07-01
Payer: COMMERCIAL

## 2022-07-01 DIAGNOSIS — R60.1 GENERALIZED EDEMA: ICD-10-CM

## 2022-07-01 DIAGNOSIS — G47.00 INSOMNIA, UNSPECIFIED TYPE: ICD-10-CM

## 2022-07-01 DIAGNOSIS — E79.0 ELEVATED URIC ACID IN BLOOD: ICD-10-CM

## 2022-07-01 DIAGNOSIS — E78.5 HYPERLIPIDEMIA LDL GOAL <70: ICD-10-CM

## 2022-07-01 DIAGNOSIS — M54.16 LUMBAR RADICULOPATHY: Primary | ICD-10-CM

## 2022-07-01 DIAGNOSIS — I15.1 HYPERTENSION SECONDARY TO OTHER RENAL DISORDERS: ICD-10-CM

## 2022-07-01 DIAGNOSIS — F06.4 ANXIETY DISORDER DUE TO MEDICAL CONDITION: ICD-10-CM

## 2022-07-01 PROCEDURE — 99606 MTMS BY PHARM EST 15 MIN: CPT | Performed by: PHARMACIST

## 2022-07-01 PROCEDURE — 99607 MTMS BY PHARM ADDL 15 MIN: CPT | Performed by: PHARMACIST

## 2022-07-01 RX ORDER — METHOCARBAMOL 500 MG/1
1500 TABLET, FILM COATED ORAL 2 TIMES DAILY
Qty: 540 TABLET | Refills: 0 | Status: SHIPPED | OUTPATIENT
Start: 2022-07-01 | End: 2022-09-21

## 2022-07-01 RX ORDER — TORSEMIDE 5 MG/1
10 TABLET ORAL 2 TIMES DAILY
COMMUNITY
Start: 2022-06-20 | End: 2022-08-02

## 2022-07-01 RX ORDER — VENLAFAXINE HYDROCHLORIDE 150 MG/1
150 CAPSULE, EXTENDED RELEASE ORAL DAILY
Qty: 90 CAPSULE | Refills: 0 | Status: SHIPPED | OUTPATIENT
Start: 2022-07-01 | End: 2022-10-20

## 2022-07-01 NOTE — TELEPHONE ENCOUNTER
Therapist called patient to discuss her 2 hour evaluation scheduled for 9/12/22 at 10am as the 2 hour evaluation cannot be done virtually. She stated [place] and would like to only attend speech therapy virtually.     Therapist then inquired whether patient would be interested in being seen sooner than September, as her ENT visit with Dr. Castillo is scheduled for 7/14/22 and Chantelle Prasad CCC-SLP would have availability for virtual therapy shortly after.     Sandy indicated that she would like to be seen starting sooner and would like a callback after the holiday weekend to cancel her visits with Magali Garrison CCC-MARIZA and reschedule 4 sessions (1 hour new SLP video; 3 biweekly return SLP video visits) starting the end of July.     She was provided with the clinic number in case she wishes to call herself on Tuesday morning, stating she doesn't always answer her phone if she doesn't recognize the number.     Mara Austin. (voice), M.S., CCC-SLP  Speech-Language Pathologist  [facility]  [phone]  [email or web address]  Pronouns: she/her/hers

## 2022-07-08 ENCOUNTER — LAB (OUTPATIENT)
Dept: LAB | Facility: CLINIC | Age: 82
End: 2022-07-08
Payer: MEDICARE

## 2022-07-08 DIAGNOSIS — R60.1 GENERALIZED EDEMA: ICD-10-CM

## 2022-07-08 DIAGNOSIS — M54.16 LUMBAR RADICULOPATHY: ICD-10-CM

## 2022-07-08 DIAGNOSIS — I25.10 CORONARY ARTERY DISEASE INVOLVING NATIVE CORONARY ARTERY OF NATIVE HEART WITHOUT ANGINA PECTORIS: ICD-10-CM

## 2022-07-08 DIAGNOSIS — E78.5 DYSLIPIDEMIA, GOAL LDL BELOW 70: ICD-10-CM

## 2022-07-08 LAB
ALBUMIN SERPL-MCNC: 3.9 G/DL (ref 3.5–5)
ALP SERPL-CCNC: 67 U/L (ref 45–120)
ALT SERPL W P-5'-P-CCNC: 10 U/L (ref 0–45)
ANION GAP SERPL CALCULATED.3IONS-SCNC: 11 MMOL/L (ref 5–18)
AST SERPL W P-5'-P-CCNC: 21 U/L (ref 0–40)
BILIRUB SERPL-MCNC: 0.6 MG/DL (ref 0–1)
BUN SERPL-MCNC: 31 MG/DL (ref 8–28)
CALCIUM SERPL-MCNC: 9.5 MG/DL (ref 8.5–10.5)
CHLORIDE BLD-SCNC: 101 MMOL/L (ref 98–107)
CHOLEST SERPL-MCNC: 307 MG/DL
CO2 SERPL-SCNC: 25 MMOL/L (ref 22–31)
CREAT SERPL-MCNC: 1.82 MG/DL (ref 0.6–1.1)
FASTING STATUS PATIENT QL REPORTED: YES
GFR SERPL CREATININE-BSD FRML MDRD: 28 ML/MIN/1.73M2
GLUCOSE BLD-MCNC: 92 MG/DL (ref 70–125)
HDLC SERPL-MCNC: 114 MG/DL
LDLC SERPL CALC-MCNC: 182 MG/DL
MAGNESIUM SERPL-MCNC: 2.5 MG/DL (ref 1.8–2.6)
POTASSIUM BLD-SCNC: 4.1 MMOL/L (ref 3.5–5)
PROT SERPL-MCNC: 7.6 G/DL (ref 6–8)
SODIUM SERPL-SCNC: 137 MMOL/L (ref 136–145)
TRIGL SERPL-MCNC: 57 MG/DL
URATE SERPL-MCNC: 5.3 MG/DL (ref 2–7.5)

## 2022-07-08 PROCEDURE — 80061 LIPID PANEL: CPT

## 2022-07-08 PROCEDURE — 80053 COMPREHEN METABOLIC PANEL: CPT

## 2022-07-08 PROCEDURE — 84550 ASSAY OF BLOOD/URIC ACID: CPT

## 2022-07-08 PROCEDURE — 83735 ASSAY OF MAGNESIUM: CPT

## 2022-07-08 PROCEDURE — 36415 COLL VENOUS BLD VENIPUNCTURE: CPT

## 2022-07-13 ENCOUNTER — OFFICE VISIT (OUTPATIENT)
Dept: CARDIOLOGY | Facility: CLINIC | Age: 82
End: 2022-07-13
Payer: MEDICARE

## 2022-07-13 VITALS
WEIGHT: 158 LBS | BODY MASS INDEX: 27.99 KG/M2 | HEART RATE: 67 BPM | SYSTOLIC BLOOD PRESSURE: 110 MMHG | DIASTOLIC BLOOD PRESSURE: 52 MMHG | RESPIRATION RATE: 16 BRPM

## 2022-07-13 DIAGNOSIS — E78.00 HYPERCHOLESTEROLEMIA: ICD-10-CM

## 2022-07-13 DIAGNOSIS — R06.09 POST-COVID CHRONIC DYSPNEA: ICD-10-CM

## 2022-07-13 DIAGNOSIS — I10 PRIMARY HYPERTENSION: ICD-10-CM

## 2022-07-13 DIAGNOSIS — I25.119 CORONARY ARTERY DISEASE INVOLVING NATIVE CORONARY ARTERY OF NATIVE HEART WITH ANGINA PECTORIS (H): Primary | ICD-10-CM

## 2022-07-13 DIAGNOSIS — R06.02 SHORTNESS OF BREATH: ICD-10-CM

## 2022-07-13 DIAGNOSIS — U09.9 POST-COVID CHRONIC DYSPNEA: ICD-10-CM

## 2022-07-13 DIAGNOSIS — I65.23 BILATERAL CAROTID ARTERY STENOSIS: ICD-10-CM

## 2022-07-13 PROCEDURE — 99215 OFFICE O/P EST HI 40 MIN: CPT | Performed by: INTERNAL MEDICINE

## 2022-07-13 RX ORDER — ROSUVASTATIN CALCIUM 10 MG/1
10 TABLET, COATED ORAL DAILY
Qty: 30 TABLET | Refills: 11 | Status: ON HOLD | OUTPATIENT
Start: 2022-07-13 | End: 2022-08-24

## 2022-07-13 NOTE — PROGRESS NOTES
Assessment/Plan:   1.  Coronary artery disease: The patient complains of her dyspnea on exertion.  She has a lot of symptoms since she had COVID-pneumonia in January 2022.  Her symptoms could be related to myocardial ischemia, diastolic congestive heart failure, versus COVID pneumonia related symptoms.  After discussion, stress cardiac MRI is requested for further evaluation of myocardial ischemia, COVID related myocardial injury and heart function and structure.  The patient started torsemide 10 mg twice a day.  She lost several pounds.  Her leg edema was resolved.    2.  Primary hypertension: Her blood pressure is controlled with the carvedilol 12.5 mg twice a day.     3.  Dyslipidemia: Her LDL was elevated to 182.  Praluent dosage is increased to 150 mg SubQ every two weeks.  Start  Crestor 10 mg at bedtime.  If she cannot tolerate to Crestor, increase the dosage.  Repeat lipid profile and liver function in 3 months.     4.  Carotid artery stenosis status post left CEA: Recent carotid ultrasound was reported mild bilateral carotid artery stenosis less than 50%.  Aggressive control LDL by PCSK9 inhibitor and Crestor.     5.  Pulmonary embolism on warfarin, stage III CKD and other chronic medical issues: Continue to follow-up with Dr. Rees.    We will follow-up for her stress cardiac MRI report and discuss the findings with the patient.    Total 66 minutes were spent in this visit for face to face visit, physical exam, review of current labs/imaging studies, plan for ongoing treatment with >50% spent on counseling and coordination of care as documented in the above mentioned note.      Thank you for the opportunity to be involved in the care of Sanyd Spencer. If you have any questions, please feel free to contact me.  I will see the patient again in 6 months and as needed.    Much or all of the text in this note was generated through the use of Dragon Dictate voice-to-text software. Errors in spelling  or words which seem out of context are unintentional. Sound alike errors, in particular, may have escaped editing.       History of Present Illness:   It is my pleasure to see Sandy Spencer at the [facility] for routine cardiology follow up and complaining of shortness of breath on exertion.  Sandy Spencer is a 81 year old female with a medical history of coronary artery disease, essential hypertension, hyperlipidemia, anxiety and depression, pancreatitis, carotid artery stenosis s/p left CEA, pulmonary embolism on warfarin, status post right nephrectomy and chronic kidney disease stage III.    The patient states that she has not done well since she had the COVID in January 2022.  She complains of her shortness of breath on exertion.  She had no chest pain.  She has no palpitations, orthopnea, PND or leg edema.  She has occasional lightheadedness.  Her blood pressure and heart rate are well controlled.  However, her LDL was significantly elevated.    Her echocardiogram in March 2022 was reported normal left ventricular size, systolic function, LVEF 60%,, normal right ventricular size and function, no obvious valvular disease.    Past Medical History:     Patient Active Problem List   Diagnosis     Focal glomerular sclerosis     RSD lower limb, bilateral     ADD (attention deficit disorder)     RSD upper limb, right     Osteopenia     Major depression     Hypertension     Insomnia     Hyperlipidemia     Right rotator cuff tear     Cluster headaches     Lumbar radiculopathy     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     Temporomandibular joint-pain-dysfunction syndrome (TMJ)     Hypothyroidism     Chronic pain     Snoring     Generalized abdominal pain     Bilateral carotid artery stenosis     Coronary artery disease involving native coronary artery of native heart without angina pectoris     Other chronic pulmonary embolism without acute cor pulmonale (H)     Hematuria      "Hydronephrosis     Bladder spasms     Anxiety disorder due to medical condition     Family relationship problem     History of posttraumatic stress disorder (PTSD)     Generalized muscle weakness     Myofascial pain     Moderate major depression (H)     Elevated lipase     Abdominal bloating     Acquired absence of other specified parts of digestive tract     Ampullary stenosis     Obstruction of biliary tree     Anemia     Aphthous ulcer of ileum     Bipolar disorder, unspecified (H)     Disorder of phosphorus metabolism     Edema     Gastroesophageal reflux disease without esophagitis     Obstruction of common bile duct     Overflow diarrhea     History of partial colectomy     Reflex sympathetic dystrophy     Solitary left kidney     Flank pain     Hypocitraturia     Primary osteoarthritis of both knees     Iron deficiency anemia     Proteinuria     Concussion with loss of consciousness     Schizoaffective disorder (H)     Muscle weakness (generalized)     Shuffling gait     Falls frequently     Long term current use of anticoagulant therapy     COPD (chronic obstructive pulmonary disease) (H)     CKD (chronic kidney disease) stage 4, GFR 15-29 ml/min (H)     Other cervical disc degeneration, unspecified cervical region     Derangement of meniscus     Intractable chronic migraine without aura     Occipital neuralgia     Post-traumatic headache     S/p nephrectomy     Sciatic neuropathy     Pain in right knee     Leg pain, bilateral     Cervical radiculopathy     Spinal stenosis of cervical region     Fibrositis of neck       Past Surgical History:     Past Surgical History:   Procedure Laterality Date     APPENDECTOMY       BELPHAROPTOSIS REPAIR      bilateral     BILE DUCT STENT PLACEMENT       BIOPSY BREAST Left     benign  1970s     CAROTID ENDARTERECTOMY Left 2009     CHOLECYSTECTOMY       COLECTOMY  1978    \"subtotal\"     ERCP W/ SPHICTEROTOMY  01/03/2017    Placement of ventral pancreatic duct stent     " "ESOPHAGOSCOPY, GASTROSCOPY, DUODENOSCOPY (EGD), COMBINED N/A 12/14/2018    Procedure: ENDOSCOPIC ULTRASOUND;  Surgeon: Babak Merida MD;  Location: [facility] [place];  Service: Gastroenterology     EYE SURGERY      Cataract     HC CYSTOSCOPY,INSERT URETERAL STENT Right 2/16/2019    Procedure: Cystoscopy with Retrograde and right stent exchange.;  Surgeon: Jayla De Paz MD;  Location: [facility] [place];  Service: Urology     HYSTERECTOMY       IR INFERIOR VENACAVAGRAM  2/23/2019     IR IVC FILTER PLACEMENT  2/14/2019     IR IVC FILTER PLACEMENT  2/14/2019     IR IVC FILTER REMOVAL  10/16/2019     IR REMOVAL IVC FILTER  10/16/2019     IR VENOCAVAGRAM INFERIOR  2/23/2019     LAPAROTOMY EXPLORATORY  7/2013    after cholecystectomy     LAPAROTOMY EXPLORATORY      after cholecystectomy had surgery for \"something that was nicked\", gravely ill and in ICU for 1 month     LUMBAR LAMINECTOMY Left 8/11/2016    Procedure: LEFT L4-5 HEMILAMINECTOMY / MEDIAL FACETECTOMY & FORAMINOTOMY, RIGHT L5-S1 HEMILAMINECTOMY WITH FACETECTOMY & FORAMINOTOMY;  Surgeon: Litzy Patel MD;  Location: [facility];  Service:      NECK SURGERY  2010    neck muscle repair     NEPHROURETERECTOMY Right 2/20/2019    Procedure: ROBOTIC RIGHT NEPHROURETERECTOMY;  Surgeon: Parker Casillas MD;  Location: [facility] [place];  Service: Urology     OTHER SURGICAL HISTORY      benign breast cyst excision     PICC  2/25/2019          PICC AND MIDLINE TEAM LINE INSERTION  2/9/2019          SC CYSTOURETHROSCOPY W/IRRIG & EVAC CLOTS N/A 2/10/2019    Procedure: CYSTOSCOPY, BLADDER BIOPSY, RIGHT SIDED URETEROSCOPY WITH SELECTED CYTOLOGY, CLOT IRRIGATION;  Surgeon: Parker Casillas MD;  Location: [facility];  Service: Urology     SALPINGOOPHORECTOMY Left 1969     TONSILLECTOMY  1942     TOTAL VAGINAL HYSTERECTOMY  1984       Family History:     Family History   Problem Relation Age of Onset     Heart Disease Mother "      Kidney Disease Mother      Aortic aneurysm Mother      Dementia Mother      Heart Disease Father      Cerebrovascular Disease Father      Kidney Disease Father      Dementia Sister      Dementia Maternal Grandmother      Dementia Sister         Social History:    reports that she quit smoking about 22 years ago. She has a 20.00 pack-year smoking history. She has never used smokeless tobacco. She reports that she does not drink alcohol and does not use drugs.    Review of Systems:   12 systems are reviewed negative except for in HPI.    Meds:     Current Outpatient Medications:      alirocumab (PRALUENT) 150 MG/ML injectable pen, Inject 1 mL (150 mg) Subcutaneous every 14 days, Disp: 6 mL, Rfl: 1     allopurinol (ZYLOPRIM) 100 MG tablet, Take 1 tablet (100 mg) by mouth 2 times daily, Disp: 60 tablet, Rfl: 1     amphetamine-dextroamphetamine (ADDERALL) 20 MG tablet, Take 1 tablet (20 mg) by mouth 2 times daily May fill 6/20 to start 6/22, Disp: 60 tablet, Rfl: 0     apixaban ANTICOAGULANT (ELIQUIS) 5 MG tablet, Take 1 tablet (5 mg) by mouth 2 times daily, Disp: 180 tablet, Rfl: 3     azelastine (ASTEPRO) 0.15 % nasal spray, Spray 2 sprays into both nostrils 2 times daily , Disp: , Rfl:      carvedilol (COREG) 6.25 MG tablet, Take 1 tablet (6.25 mg) by mouth 2 times daily (with meals), Disp: 180 tablet, Rfl: 1     Cholecalciferol (VITAMIN D-3) 125 MCG (5000 UT) TABS, Take 5,000 Units by mouth daily, Disp: , Rfl:      HEMP OIL OR EXTRACT OR OTHER CBD CANNABINOID, NOT MEDICAL CANNABIS,, 50 mg Delta 8 Gummies, Disp: , Rfl:      levothyroxine (SYNTHROID/LEVOTHROID) 50 MCG tablet, Take 1 tablet (50 mcg) by mouth daily, Disp: 90 tablet, Rfl: 3     lidocaine (LIDODERM) 5 % patch, Place 1 patch onto the skin every 24 hours To prevent lidocaine toxicity, patient should be patch free for 12 hrs daily., Disp: 30 patch, Rfl: 0     magnesium oxide 400 MG CAPS, Take 1 capsule by mouth 3 times daily, Disp: 90 capsule, Rfl: 3      methocarbamol (ROBAXIN) 500 MG tablet, Take 3 tablets (1,500 mg) by mouth 2 times daily, Disp: 540 tablet, Rfl: 0     olopatadine (PATANOL) 0.1 % ophthalmic solution, Place 1 drop into both eyes 2 times daily, Disp: 5 mL, Rfl: 11     rosuvastatin (CRESTOR) 10 MG tablet, Take 1 tablet (10 mg) by mouth daily, Disp: 30 tablet, Rfl: 11     torsemide (DEMADEX) 5 MG tablet, Take 10 mg by mouth 2 times daily, Disp: , Rfl:      traZODone (DESYREL) 100 MG tablet, Take 3-4 tablets (300-400 mg) by mouth At Bedtime (Patient taking differently: Take 200-400 mg by mouth At Bedtime Patient taking 2 tablets oral at HS), Disp: 360 tablet, Rfl: 1     venlafaxine (EFFEXOR XR) 150 MG 24 hr capsule, Take 1 capsule (150 mg) by mouth daily, Disp: 90 capsule, Rfl: 0     zinc gluconate 50 MG tablet, Take 1 tablet (50 mg) by mouth daily, Disp: 30 tablet, Rfl: 3     zonisamide (ZONEGRAN) 25 MG capsule, Take 2 capsules (50 mg) by mouth At Bedtime, Disp: 60 capsule, Rfl: 3    Allergies:   Acamprosate, Augmentin, Carbamazepine, Cyclobenzaprine, Mirtazapine, Prednisone, Pregabalin, Sulfa drugs, Sulfamethoxazole-trimethoprim, Zolpidem, Acetaminophen, Amiloride, Amoxicillin, Aspirin, Bupropion, Chromium, Duloxetine hcl, Nsaids, Other drug allergy (see comments), Oxycodone, Serotonin, Sulindac, Tolmetin, Verapamil, and Valproic acid      Objective:      Physical Exam  71.7 kg (158 lb)     Body mass index is 27.99 kg/m .  /52 (BP Location: Left arm, Patient Position: Sitting, Cuff Size: Adult Regular)   Pulse 67   Resp 16   Wt 71.7 kg (158 lb)   LMP  (LMP Unknown)   BMI 27.99 kg/m      General Appearance:   Awake, Alert, No acute distress.   HEENT:  Pupil equal and reactive to light. No scleral icterus; the mucous membranes were moist.   Neck: No cervical bruits. No JVD. No thyromegaly.     Chest: The spine was straight. The chest was symmetric.   Lungs:   Respirations unlabored; Lungs are clear to auscultation. No crackles. No wheezing.    Cardiovascular:   Regular rhythm and rate, normal first and second heart sounds with no murmurs. No rubs or gallops.    Abdomen:  Soft. No tenderness. Non-distended. Bowels sounds are present   Extremities: Equal tibial pulses. No leg edema.   Skin: No rashes or ulcers. Warm, Dry.   Musculoskeletal: No tenderness. No deformity.   Neurologic: Mood and affect are appropriate. No focal deficits.         EKG: Personally reviewed  Sinus rhythm   Low voltage QRS   Possible Septal infarct , age undetermined   Abnormal ECG   When compared with ECG of 05-FEB-2022 11:33,   Premature ventricular complexes are no longer Present   Minimal criteria for Septal infarct is now Present     Cardiac Imaging Studies  ECHO on 3-:  1. Normal left ventricular size and systolic performance with a visually  estimated ejection fraction of 60%.  2. There is mild aortic valve sclerosis without significant stenosis.  3. Normal right ventricular size and systolic performance.    Nuclear stress test on 4-:     The nuclear stress test is negative for inducible myocardial ischemia or infarction.     The left ventricular ejection fraction at stress is greater than 70%.     A prior study was conducted on 5/30/2017.  This study has no change when compared with the prior study.     Carotid US on 9-:  IMPRESSION:  1.  Moderate plaque formation, velocities consistent with less than 50% stenosis in the right internal carotid artery.  2.  Mild plaque formation, velocities consistent with less than 50% stenosis in the left internal carotid artery.  3.  Flow within the vertebral arteries is antegrade.    Event monitor on 6-:  Sinus rhythm with occasional PACs and short atrial runs. Some of the PACs correlated with symptom events, some did not. One symptom event did not correlate with any abnormalities.  No sustained arrhythmias, pauses, or atrial fibrillation.    Lab Review   Lab Results   Component Value Date      10/15/2021    CO2 23 10/15/2021    BUN 21 10/15/2021     Lab Results   Component Value Date    WBC 4.6 10/15/2021    HGB 11.8 10/15/2021    HCT 36.4 10/15/2021     10/15/2021     10/15/2021     Lab Results   Component Value Date    CHOL 307 (H) 07/08/2022    CHOL 275 (H) 06/08/2022    CHOL 272 (H) 04/08/2022     Lab Results   Component Value Date     07/08/2022     06/08/2022    HDL 83 04/08/2022     No components found for: LDLCALC  Lab Results   Component Value Date    TRIG 57 07/08/2022    TRIG 77 06/08/2022    TRIG 99 04/08/2022     No results found for: CHOLHDL  Lab Results   Component Value Date    TROPONINI <0.01 03/15/2021     No results found for: BNP  Lab Results   Component Value Date    TSH 0.64 07/13/2021

## 2022-07-13 NOTE — LETTER
7/13/2022    Caitlyn Rees MD  [address]  [facility] [address]    RE: Sandy Spencer       Dear Colleague,     I had the pleasure of seeing Sandy Spencer in the [facility].            Assessment/Plan:   1.  Coronary artery disease: The patient complains of her dyspnea on exertion.  She has a lot of symptoms since she had COVID-pneumonia in January 2022.  Her symptoms could be related to myocardial ischemia, diastolic congestive heart failure, versus COVID pneumonia related symptoms.  After discussion, stress cardiac MRI is requested for further evaluation of myocardial ischemia, COVID related myocardial injury and heart function and structure.  The patient started torsemide 10 mg twice a day.  She lost several pounds.  Her leg edema was resolved.    2.  Primary hypertension: Her blood pressure is controlled with the carvedilol 12.5 mg twice a day.     3.  Dyslipidemia: Her LDL was elevated to 182.  Praluent dosage is increased to 150 mg SubQ every two weeks.  Start  Crestor 10 mg at bedtime.  If she cannot tolerate to Crestor, increase the dosage.  Repeat lipid profile and liver function in 3 months.     4.  Carotid artery stenosis status post left CEA: Recent carotid ultrasound was reported mild bilateral carotid artery stenosis less than 50%.  Aggressive control LDL by PCSK9 inhibitor and Crestor.     5.  Pulmonary embolism on warfarin, stage III CKD and other chronic medical issues: Continue to follow-up with Dr. Rees.    We will follow-up for her stress cardiac MRI report and discuss the findings with the patient.    Total 66 minutes were spent in this visit for face to face visit, physical exam, review of current labs/imaging studies, plan for ongoing treatment with >50% spent on counseling and coordination of care as documented in the above mentioned note.      Thank you for the opportunity to be involved in the care of Sandy Spencer. If you have any questions,  please feel free to contact me.  I will see the patient again in 6 months and as needed.    Much or all of the text in this note was generated through the use of Dragon Dictate voice-to-text software. Errors in spelling or words which seem out of context are unintentional. Sound alike errors, in particular, may have escaped editing.       History of Present Illness:   It is my pleasure to see Sandy Spencer at the [facility] for routine cardiology follow up and complaining of shortness of breath on exertion.  Sandy Spencer is a 81 year old female with a medical history of coronary artery disease, essential hypertension, hyperlipidemia, anxiety and depression, pancreatitis, carotid artery stenosis s/p left CEA, pulmonary embolism on warfarin, status post right nephrectomy and chronic kidney disease stage III.    The patient states that she has not done well since she had the COVID in January 2022.  She complains of her shortness of breath on exertion.  She had no chest pain.  She has no palpitations, orthopnea, PND or leg edema.  She has occasional lightheadedness.  Her blood pressure and heart rate are well controlled.  However, her LDL was significantly elevated.    Her echocardiogram in March 2022 was reported normal left ventricular size, systolic function, LVEF 60%,, normal right ventricular size and function, no obvious valvular disease.    Past Medical History:     Patient Active Problem List   Diagnosis     Focal glomerular sclerosis     RSD lower limb, bilateral     ADD (attention deficit disorder)     RSD upper limb, right     Osteopenia     Major depression     Hypertension     Insomnia     Hyperlipidemia     Right rotator cuff tear     Cluster headaches     Lumbar radiculopathy     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     Temporomandibular joint-pain-dysfunction syndrome (TMJ)     Hypothyroidism     Chronic pain     Snoring     Generalized abdominal pain      Bilateral carotid artery stenosis     Coronary artery disease involving native coronary artery of native heart without angina pectoris     Other chronic pulmonary embolism without acute cor pulmonale (H)     Hematuria     Hydronephrosis     Bladder spasms     Anxiety disorder due to medical condition     Family relationship problem     History of posttraumatic stress disorder (PTSD)     Generalized muscle weakness     Myofascial pain     Moderate major depression (H)     Elevated lipase     Abdominal bloating     Acquired absence of other specified parts of digestive tract     Ampullary stenosis     Obstruction of biliary tree     Anemia     Aphthous ulcer of ileum     Bipolar disorder, unspecified (H)     Disorder of phosphorus metabolism     Edema     Gastroesophageal reflux disease without esophagitis     Obstruction of common bile duct     Overflow diarrhea     History of partial colectomy     Reflex sympathetic dystrophy     Solitary left kidney     Flank pain     Hypocitraturia     Primary osteoarthritis of both knees     Iron deficiency anemia     Proteinuria     Concussion with loss of consciousness     Schizoaffective disorder (H)     Muscle weakness (generalized)     Shuffling gait     Falls frequently     Long term current use of anticoagulant therapy     COPD (chronic obstructive pulmonary disease) (H)     CKD (chronic kidney disease) stage 4, GFR 15-29 ml/min (H)     Other cervical disc degeneration, unspecified cervical region     Derangement of meniscus     Intractable chronic migraine without aura     Occipital neuralgia     Post-traumatic headache     S/p nephrectomy     Sciatic neuropathy     Pain in right knee     Leg pain, bilateral     Cervical radiculopathy     Spinal stenosis of cervical region     Fibrositis of neck       Past Surgical History:     Past Surgical History:   Procedure Laterality Date     APPENDECTOMY       BELPHAROPTOSIS REPAIR      bilateral     BILE DUCT STENT PLACEMENT        "BIOPSY BREAST Left     benign  1970s     CAROTID ENDARTERECTOMY Left 2009     CHOLECYSTECTOMY       COLECTOMY  1978    \"subtotal\"     ERCP W/ SPHICTEROTOMY  01/03/2017    Placement of ventral pancreatic duct stent     ESOPHAGOSCOPY, GASTROSCOPY, DUODENOSCOPY (EGD), COMBINED N/A 12/14/2018    Procedure: ENDOSCOPIC ULTRASOUND;  Surgeon: Babak Merida MD;  Location: [facility];  Service: Gastroenterology     EYE SURGERY      Cataract     HC CYSTOSCOPY,INSERT URETERAL STENT Right 2/16/2019    Procedure: Cystoscopy with Retrograde and right stent exchange.;  Surgeon: Jayla De Paz MD;  Location: [facility];  Service: Urology     HYSTERECTOMY       IR INFERIOR VENACAVAGRAM  2/23/2019     IR IVC FILTER PLACEMENT  2/14/2019     IR IVC FILTER PLACEMENT  2/14/2019     IR IVC FILTER REMOVAL  10/16/2019     IR REMOVAL IVC FILTER  10/16/2019     IR VENOCAVAGRAM INFERIOR  2/23/2019     LAPAROTOMY EXPLORATORY  7/2013    after cholecystectomy     LAPAROTOMY EXPLORATORY      after cholecystectomy had surgery for \"something that was nicked\", gravely ill and in ICU for 1 month     LUMBAR LAMINECTOMY Left 8/11/2016    Procedure: LEFT L4-5 HEMILAMINECTOMY / MEDIAL FACETECTOMY & FORAMINOTOMY, RIGHT L5-S1 HEMILAMINECTOMY WITH FACETECTOMY & FORAMINOTOMY;  Surgeon: Litzy Patel MD;  Location: [facility];  Service:      NECK SURGERY  2010    neck muscle repair     NEPHROURETERECTOMY Right 2/20/2019    Procedure: ROBOTIC RIGHT NEPHROURETERECTOMY;  Surgeon: Parker Casillas MD;  Location: [facility];  Service: Urology     OTHER SURGICAL HISTORY      benign breast cyst excision     PICC  2/25/2019          PICC AND MIDLINE TEAM LINE INSERTION  2/9/2019          ME CYSTOURETHROSCOPY W/IRRIG & EVAC CLOTS N/A 2/10/2019    Procedure: CYSTOSCOPY, BLADDER BIOPSY, RIGHT SIDED URETEROSCOPY WITH SELECTED CYTOLOGY, CLOT IRRIGATION;  Surgeon: Parker Casillas MD;  Location: [facility]" Main OR;  Service: Urology     SALPINGOOPHORECTOMY Left 1969     TONSILLECTOMY  1942     TOTAL VAGINAL HYSTERECTOMY  1984       Family History:     Family History   Problem Relation Age of Onset     Heart Disease Mother      Kidney Disease Mother      Aortic aneurysm Mother      Dementia Mother      Heart Disease Father      Cerebrovascular Disease Father      Kidney Disease Father      Dementia Sister      Dementia Maternal Grandmother      Dementia Sister         Social History:    reports that she quit smoking about 22 years ago. She has a 20.00 pack-year smoking history. She has never used smokeless tobacco. She reports that she does not drink alcohol and does not use drugs.    Review of Systems:   12 systems are reviewed negative except for in HPI.    Meds:     Current Outpatient Medications:      alirocumab (PRALUENT) 150 MG/ML injectable pen, Inject 1 mL (150 mg) Subcutaneous every 14 days, Disp: 6 mL, Rfl: 1     allopurinol (ZYLOPRIM) 100 MG tablet, Take 1 tablet (100 mg) by mouth 2 times daily, Disp: 60 tablet, Rfl: 1     amphetamine-dextroamphetamine (ADDERALL) 20 MG tablet, Take 1 tablet (20 mg) by mouth 2 times daily May fill 6/20 to start 6/22, Disp: 60 tablet, Rfl: 0     apixaban ANTICOAGULANT (ELIQUIS) 5 MG tablet, Take 1 tablet (5 mg) by mouth 2 times daily, Disp: 180 tablet, Rfl: 3     azelastine (ASTEPRO) 0.15 % nasal spray, Spray 2 sprays into both nostrils 2 times daily , Disp: , Rfl:      carvedilol (COREG) 6.25 MG tablet, Take 1 tablet (6.25 mg) by mouth 2 times daily (with meals), Disp: 180 tablet, Rfl: 1     Cholecalciferol (VITAMIN D-3) 125 MCG (5000 UT) TABS, Take 5,000 Units by mouth daily, Disp: , Rfl:      HEMP OIL OR EXTRACT OR OTHER CBD CANNABINOID, NOT MEDICAL CANNABIS,, 50 mg Delta 8 Gummies, Disp: , Rfl:      levothyroxine (SYNTHROID/LEVOTHROID) 50 MCG tablet, Take 1 tablet (50 mcg) by mouth daily, Disp: 90 tablet, Rfl: 3     lidocaine (LIDODERM) 5 % patch, Place 1 patch onto the  skin every 24 hours To prevent lidocaine toxicity, patient should be patch free for 12 hrs daily., Disp: 30 patch, Rfl: 0     magnesium oxide 400 MG CAPS, Take 1 capsule by mouth 3 times daily, Disp: 90 capsule, Rfl: 3     methocarbamol (ROBAXIN) 500 MG tablet, Take 3 tablets (1,500 mg) by mouth 2 times daily, Disp: 540 tablet, Rfl: 0     olopatadine (PATANOL) 0.1 % ophthalmic solution, Place 1 drop into both eyes 2 times daily, Disp: 5 mL, Rfl: 11     rosuvastatin (CRESTOR) 10 MG tablet, Take 1 tablet (10 mg) by mouth daily, Disp: 30 tablet, Rfl: 11     torsemide (DEMADEX) 5 MG tablet, Take 10 mg by mouth 2 times daily, Disp: , Rfl:      traZODone (DESYREL) 100 MG tablet, Take 3-4 tablets (300-400 mg) by mouth At Bedtime (Patient taking differently: Take 200-400 mg by mouth At Bedtime Patient taking 2 tablets oral at HS), Disp: 360 tablet, Rfl: 1     venlafaxine (EFFEXOR XR) 150 MG 24 hr capsule, Take 1 capsule (150 mg) by mouth daily, Disp: 90 capsule, Rfl: 0     zinc gluconate 50 MG tablet, Take 1 tablet (50 mg) by mouth daily, Disp: 30 tablet, Rfl: 3     zonisamide (ZONEGRAN) 25 MG capsule, Take 2 capsules (50 mg) by mouth At Bedtime, Disp: 60 capsule, Rfl: 3    Allergies:   Acamprosate, Augmentin, Carbamazepine, Cyclobenzaprine, Mirtazapine, Prednisone, Pregabalin, Sulfa drugs, Sulfamethoxazole-trimethoprim, Zolpidem, Acetaminophen, Amiloride, Amoxicillin, Aspirin, Bupropion, Chromium, Duloxetine hcl, Nsaids, Other drug allergy (see comments), Oxycodone, Serotonin, Sulindac, Tolmetin, Verapamil, and Valproic acid      Objective:      Physical Exam  71.7 kg (158 lb)     Body mass index is 27.99 kg/m .  /52 (BP Location: Left arm, Patient Position: Sitting, Cuff Size: Adult Regular)   Pulse 67   Resp 16   Wt 71.7 kg (158 lb)   LMP  (LMP Unknown)   BMI 27.99 kg/m      General Appearance:   Awake, Alert, No acute distress.   HEENT:  Pupil equal and reactive to light. No scleral icterus; the mucous  membranes were moist.   Neck: No cervical bruits. No JVD. No thyromegaly.     Chest: The spine was straight. The chest was symmetric.   Lungs:   Respirations unlabored; Lungs are clear to auscultation. No crackles. No wheezing.   Cardiovascular:   Regular rhythm and rate, normal first and second heart sounds with no murmurs. No rubs or gallops.    Abdomen:  Soft. No tenderness. Non-distended. Bowels sounds are present   Extremities: Equal tibial pulses. No leg edema.   Skin: No rashes or ulcers. Warm, Dry.   Musculoskeletal: No tenderness. No deformity.   Neurologic: Mood and affect are appropriate. No focal deficits.         EKG: Personally reviewed  Sinus rhythm   Low voltage QRS   Possible Septal infarct , age undetermined   Abnormal ECG   When compared with ECG of 05-FEB-2022 11:33,   Premature ventricular complexes are no longer Present   Minimal criteria for Septal infarct is now Present     Cardiac Imaging Studies  ECHO on 3-:  1. Normal left ventricular size and systolic performance with a visually  estimated ejection fraction of 60%.  2. There is mild aortic valve sclerosis without significant stenosis.  3. Normal right ventricular size and systolic performance.    Nuclear stress test on 4-:     The nuclear stress test is negative for inducible myocardial ischemia or infarction.     The left ventricular ejection fraction at stress is greater than 70%.     A prior study was conducted on 5/30/2017.  This study has no change when compared with the prior study.     Carotid US on 9-:  IMPRESSION:  1.  Moderate plaque formation, velocities consistent with less than 50% stenosis in the right internal carotid artery.  2.  Mild plaque formation, velocities consistent with less than 50% stenosis in the left internal carotid artery.  3.  Flow within the vertebral arteries is antegrade.    Event monitor on 6-:  Sinus rhythm with occasional PACs and short atrial runs. Some of the PACs  correlated with symptom events, some did not. One symptom event did not correlate with any abnormalities.  No sustained arrhythmias, pauses, or atrial fibrillation.    Lab Review   Lab Results   Component Value Date     10/15/2021    CO2 23 10/15/2021    BUN 21 10/15/2021     Lab Results   Component Value Date    WBC 4.6 10/15/2021    HGB 11.8 10/15/2021    HCT 36.4 10/15/2021     10/15/2021     10/15/2021     Lab Results   Component Value Date    CHOL 307 (H) 07/08/2022    CHOL 275 (H) 06/08/2022    CHOL 272 (H) 04/08/2022     Lab Results   Component Value Date     07/08/2022     06/08/2022    HDL 83 04/08/2022     No components found for: LDLCALC  Lab Results   Component Value Date    TRIG 57 07/08/2022    TRIG 77 06/08/2022    TRIG 99 04/08/2022     No results found for: CHOLHDL  Lab Results   Component Value Date    TROPONINI <0.01 03/15/2021     No results found for: BNP  Lab Results   Component Value Date    TSH 0.64 07/13/2021                 Thank you for allowing me to participate in the care of your patient.      Sincerely,     Luz Elena Ybarra MD     [facility]  cc:   Christine Bennett PA-C  [address] [place] [address]

## 2022-07-14 ENCOUNTER — PATIENT OUTREACH (OUTPATIENT)
Dept: CARE COORDINATION | Facility: CLINIC | Age: 82
End: 2022-07-14

## 2022-07-14 ENCOUNTER — OFFICE VISIT (OUTPATIENT)
Dept: OTOLARYNGOLOGY | Facility: CLINIC | Age: 82
End: 2022-07-14
Attending: PHYSICIAN ASSISTANT
Payer: MEDICARE

## 2022-07-14 DIAGNOSIS — J38.01 UNILATERAL VOCAL CORD PARALYSIS: Primary | ICD-10-CM

## 2022-07-14 DIAGNOSIS — R13.10 DYSPHAGIA, UNSPECIFIED TYPE: ICD-10-CM

## 2022-07-14 DIAGNOSIS — R06.02 SHORTNESS OF BREATH: ICD-10-CM

## 2022-07-14 DIAGNOSIS — R06.09 POST-COVID CHRONIC DYSPNEA: ICD-10-CM

## 2022-07-14 DIAGNOSIS — R49.0 VOICE HOARSENESS: ICD-10-CM

## 2022-07-14 DIAGNOSIS — U09.9 POST-COVID CHRONIC DYSPNEA: ICD-10-CM

## 2022-07-14 PROCEDURE — 31575 DIAGNOSTIC LARYNGOSCOPY: CPT | Performed by: OTOLARYNGOLOGY

## 2022-07-14 PROCEDURE — 99213 OFFICE O/P EST LOW 20 MIN: CPT | Mod: 25 | Performed by: OTOLARYNGOLOGY

## 2022-07-14 NOTE — PROGRESS NOTES
Clinic Care Coordination Contact  [facility]/Voicemail       Clinical Data: Care Coordinator Outreach  Outreach attempted x 3.  Left message on patient's voicemail with call back information and requested return call.  Plan: Care Coordinator CHW will send chart review to [facility] to review patient's enrollment status with [facility]  Care Coordinator will try to reach patient again in 1 month.  8/15/2022    CHW left vm's on 5/19, 6/1, 6/13 and 7/14  Letter was sent via [employer] on 6/13      Kaitlyn PLEITEZ  Community Health Worker  [facility] Care Coordination  [place] [email or web address]  Office: [phone]

## 2022-07-14 NOTE — LETTER
7/14/2022         RE: Sandy Spencer  [address]  [place] [address]        Dear Colleague,    Thank you for referring your patient, Sandy Spencer, to the [facility]. Please see a copy of my visit note below.    CHIEF COMPLAINT:   Diagnoses       Codes Comments    Shortness of breath     R06.02     Dysphagia, unspecified type     R13.10     Post-COVID chronic dyspnea     R06.09, U09.9     Voice hoarseness     R49.0              HISTORY OF PRESENT ILLNESS    Sandy was seen at the behest of Puig-Wong for hoarsenss of voice. This has been present for        REVIEW OF SYSTEMS    Review of Systems as per HPI and PMHx, otherwise 10 system review system are negative.     Acamprosate, Augmentin, Carbamazepine, Cyclobenzaprine, Mirtazapine, Prednisone, Pregabalin, Sulfa drugs, Sulfamethoxazole-trimethoprim, Zolpidem, Acetaminophen, Amiloride, Amoxicillin, Aspirin, Bupropion, Chromium, Duloxetine hcl, Nsaids, Other drug allergy (see comments), Oxycodone, Serotonin, Sulindac, Tolmetin, Verapamil, and Valproic acid     LMP  (LMP Unknown)     HEAD: Normal appearance and symmetry:  No cutaneous lesions.      NECK:  supple     EARS: normal TM, EACs    EYES:  EOMI    CN VII/XII:  intact     NOSE:     Dorsum:   straight  Septum:  midline  Mucosa:  moist        ORAL CAVITY/OROPHARYNX:     Lips:  Normal.  Tongue: normal, midline  Mucosa:   no lesions     NECK:  Trachea:  midline.              Thyroid:  normal              Adenopathy:  none        NEURO:   Alert and Oriented     GAIT AND STATION:  normal     RESPIRATORY:   Symmetry and Respiratory effort     PSYCH:  Normal mood and affect     SKIN:   warm and dry         FLEX LARYNGOSCOPY:    After obtaining consent, a flexible laryngoscope is used to examine both nasal cavities, the nasopharynx, pharnx, and larynx.      Nasal cavity: wnl  NP: wnl  Pharnx: wnl  Larynx: Left TVC immobile in median position  Secretions: clear,  copius        IMPRESSION:    Encounter Diagnoses   Name Primary?     Shortness of breath      Dysphagia, unspecified type      Post-COVID chronic dyspnea      Voice hoarseness           RECOMMENDATIONS:    .CT neck (contrast may be contraindicated)  Speech therapy        Again, thank you for allowing me to participate in the care of your patient.        Sincerely,        Rigoberto Castillo MD

## 2022-07-14 NOTE — PROGRESS NOTES
CHIEF COMPLAINT:   Diagnoses       Codes Comments    Shortness of breath     R06.02     Dysphagia, unspecified type     R13.10     Post-COVID chronic dyspnea     R06.09, U09.9     Voice hoarseness     R49.0              HISTORY OF PRESENT ILLNESS    Sandy was seen at the behest of Puig-Wong for hoarsenss of voice. This has been present for        REVIEW OF SYSTEMS    Review of Systems as per HPI and PMHx, otherwise 10 system review system are negative.     Acamprosate, Augmentin, Carbamazepine, Cyclobenzaprine, Mirtazapine, Prednisone, Pregabalin, Sulfa drugs, Sulfamethoxazole-trimethoprim, Zolpidem, Acetaminophen, Amiloride, Amoxicillin, Aspirin, Bupropion, Chromium, Duloxetine hcl, Nsaids, Other drug allergy (see comments), Oxycodone, Serotonin, Sulindac, Tolmetin, Verapamil, and Valproic acid     LMP  (LMP Unknown)     HEAD: Normal appearance and symmetry:  No cutaneous lesions.      NECK:  supple     EARS: normal TM, EACs    EYES:  EOMI    CN VII/XII:  intact     NOSE:     Dorsum:   straight  Septum:  midline  Mucosa:  moist        ORAL CAVITY/OROPHARYNX:     Lips:  Normal.  Tongue: normal, midline  Mucosa:   no lesions     NECK:  Trachea:  midline.              Thyroid:  normal              Adenopathy:  none        NEURO:   Alert and Oriented     GAIT AND STATION:  normal     RESPIRATORY:   Symmetry and Respiratory effort     PSYCH:  Normal mood and affect     SKIN:   warm and dry         FLEX LARYNGOSCOPY:    After obtaining consent, a flexible laryngoscope is used to examine both nasal cavities, the nasopharynx, pharnx, and larynx.      Nasal cavity: wnl  NP: wnl  Pharnx: wnl  Larynx: Left TVC immobile in median position  Secretions: clear, copius        IMPRESSION:    Encounter Diagnoses   Name Primary?     Shortness of breath      Dysphagia, unspecified type      Post-COVID chronic dyspnea      Voice hoarseness           RECOMMENDATIONS:    .CT neck (contrast may be contraindicated)  Speech therapy

## 2022-07-15 ENCOUNTER — PATIENT OUTREACH (OUTPATIENT)
Dept: CARE COORDINATION | Facility: CLINIC | Age: 82
End: 2022-07-15

## 2022-07-15 DIAGNOSIS — I25.119 CORONARY ARTERY DISEASE INVOLVING NATIVE CORONARY ARTERY OF NATIVE HEART WITH ANGINA PECTORIS (H): Primary | ICD-10-CM

## 2022-07-15 NOTE — LETTER
[facility] CARE COORDINATION  [facility]    July 15, 2022    Sandy Spencer  [address]  [place] [address]      Dear Sandy,    I have been unsuccessful in reaching you since our last contact. At this time the Care Coordination team will make no further attempts to reach you, however this does not change your ability to continue receiving care from your providers at your primary care clinic. If you need additional support from a care coordinator in the future please contact Kaitlyn at [phone].    All of us at [facility] are invested in your health and are here to assist you in meeting your goals.     Sincerely,    Rochelle Weber,   [facility]  [phone]

## 2022-07-15 NOTE — PROGRESS NOTES
Contact   Chart Review     Situation: Patient chart reviewed by .    Background: Enrolled in care coordination.    Assessment: Has been unreachable with several voice mails and letter sent.    Plan/Recommendations: Disenrolling from care coordination. Open to restarting service if patient contacts care coordination team.    Rochelle Weber,   [facility]  [phone]

## 2022-07-18 ENCOUNTER — OFFICE VISIT (OUTPATIENT)
Dept: PALLIATIVE MEDICINE | Facility: OTHER | Age: 82
End: 2022-07-18
Payer: MEDICARE

## 2022-07-18 VITALS — SYSTOLIC BLOOD PRESSURE: 101 MMHG | DIASTOLIC BLOOD PRESSURE: 64 MMHG | OXYGEN SATURATION: 95 % | HEART RATE: 89 BPM

## 2022-07-18 DIAGNOSIS — J38.01 UNILATERAL VOCAL CORD PARALYSIS: Primary | ICD-10-CM

## 2022-07-18 DIAGNOSIS — M79.18 MYOFASCIAL PAIN: Primary | ICD-10-CM

## 2022-07-18 PROCEDURE — 96372 THER/PROPH/DIAG INJ SC/IM: CPT | Performed by: PHYSICAL MEDICINE & REHABILITATION

## 2022-07-18 PROCEDURE — 20552 NJX 1/MLT TRIGGER POINT 1/2: CPT | Performed by: PHYSICAL MEDICINE & REHABILITATION

## 2022-07-18 PROCEDURE — G0463 HOSPITAL OUTPT CLINIC VISIT: HCPCS

## 2022-07-18 PROCEDURE — 250N000011 HC RX IP 250 OP 636: Performed by: PHYSICAL MEDICINE & REHABILITATION

## 2022-07-18 RX ORDER — TRIAMCINOLONE ACETONIDE 40 MG/ML
40 INJECTION, SUSPENSION INTRA-ARTICULAR; INTRAMUSCULAR ONCE
Status: COMPLETED | OUTPATIENT
Start: 2022-07-18 | End: 2022-07-18

## 2022-07-18 RX ORDER — BUPIVACAINE HYDROCHLORIDE 2.5 MG/ML
7 INJECTION, SOLUTION EPIDURAL; INFILTRATION; INTRACAUDAL ONCE
Status: COMPLETED | OUTPATIENT
Start: 2022-07-18 | End: 2022-07-18

## 2022-07-18 RX ADMIN — TRIAMCINOLONE ACETONIDE 40 MG: 40 INJECTION, SUSPENSION INTRA-ARTICULAR; INTRAMUSCULAR at 14:14

## 2022-07-18 RX ADMIN — BUPIVACAINE HYDROCHLORIDE 17.5 MG: 2.5 INJECTION, SOLUTION EPIDURAL; INFILTRATION; INTRACAUDAL; PERINEURAL at 14:14

## 2022-07-18 ASSESSMENT — PAIN SCALES - GENERAL
PAINLEVEL: MILD PAIN (3)
PAINLEVEL: EXTREME PAIN (8)

## 2022-07-18 NOTE — NURSING NOTE
Pre-procedure Intake  If YES to any questions or NO to having a   Please complete laminated checklist and leave on the computer keyboard for Provider, verbally inform provider if able.    For SCS Trial, RFA's or any sedation procedure:  Have you been fasting? NA    If yes, for how long?     Are you taking any any blood thinners such as Coumadin, Warfarin, Jantoven, Pradaxa Xarelto, Eliquis, Edoxaban, Enoxaparin, Lovenox, Heparin, Arixtra, Fondaparinux, or Fragmin? OR Antiplatelet medication such as Plavix, Brilinta, or Effient?   Yes -   Other blood thinners     If yes, when did you take your last dose? Eliquis    Do you take aspirin?  No    If cervical procedure, have you held aspirin for 6 days?   NA    Do you have any allergies to contrast dye, iodine, steroid and/or numbing medications?  NO    Are you currently taking antibiotics or have an active infection?  NO    Have you had a fever/elevated temperature within the past week? NO    Are you currently taking oral steroids? Not Applicable    Do you have a ? NA    Are you pregnant or breastfeeding?  NO    Have you received the COVID-19 vaccine? No     If yes, was it your 1st, 2nd or only dose needed?     Date of most recent vaccine:     Notify provider and RNs if systolic BP >170, diastolic BP >100, P >100 or O2 sats < 90%    Sammie Go MA  [facility]

## 2022-07-18 NOTE — PATIENT INSTRUCTIONS
[facility]  Post Procedure Instructions    Today you had:  trigger point injections                          Medications used:    bupivicaine   kenolog           Go to the emergency room if you develop any shortness of breath  Monitor the injection sites for signs and symptoms of infection-fever, chills, redness, swelling, warmth, or drainage to areas.  You may have soreness at injection sites for up to 24 hours.  It may take up to 14 days for the steroid medication to start working although you may feel the effect as early as a few days after the procedure.     You may apply ice to the painful areas to help minimize the discomfort of the needle injection sites  Do not apply heat to sites for at least 12 hours.  You may use anti-inflammatory medications or Tylenol for pain control if necessary    Pain Clinic phone number:  [phone]

## 2022-07-18 NOTE — PROGRESS NOTES
Pre procedure Diagnosis: Myofascial pain   Post procedure Diagnosis: Same  Procedure performed: Trigger point injections  Anesthesia: None  Complications: None  Operators: Violetta Marcano MD     Indications:   Sandy Spencer is a 81 year old female with a history of right shoulder pain.  Exam shows myofascial pain of the muscle groups listed below.    Options/alternatives, benefits and risks were discussed with the patient including bleeding, infection, tissue trauma and pnuemothorax.  Questions were answered to her satisfaction and she agrees to proceed. Voluntary informed consent was obtained and signed.     Vitals were reviewed: Yes  Allergies were reviewed:  Yes; patient reports tolerating steroids with prior injections.   Medications were reviewed:  Yes   Pre-procedure pain score: 8/10    Procedure:  After getting informed consent, a Pause for the Cause was performed.    Trigger points were identified by patient, and marked when appropriate.  The area was prepped with Chloroprep.    Using clean technique, injections were completed using a 25G, 1.5 inch needle.  After negative aspiration, injection was completed.  A total of 8 locations were injected.  When possible, tissue was retracted from the chest wall to avoid lung injury.    Muscle groups injected:  Right trapezius x 5 sites  Right rhomboid major 2 site  Right rhomboid minor x 1 site    Injection solution contained:  7 ml of 0.25% bupivacaine and 40 mg kenalog. 1 ml injected equally between both sites.     Hemostasis was achieved, the area was cleaned, and bandaids were placed when appropriate.  The patient tolerated the procedure well.    Post-procedure pain score: 3/10    Follow-up includes:   - Follow up with referring provider  - May repeat TPI prn.     Violetta Marcano MD  [facility]

## 2022-07-19 ENCOUNTER — HOSPITAL ENCOUNTER (OUTPATIENT)
Dept: MRI IMAGING | Facility: HOSPITAL | Age: 82
Discharge: HOME | End: 2022-07-19
Attending: INTERNAL MEDICINE
Payer: MEDICARE

## 2022-07-19 VITALS — HEART RATE: 90 BPM | OXYGEN SATURATION: 95 % | DIASTOLIC BLOOD PRESSURE: 61 MMHG | SYSTOLIC BLOOD PRESSURE: 118 MMHG

## 2022-07-19 DIAGNOSIS — I25.119 CORONARY ARTERY DISEASE INVOLVING NATIVE CORONARY ARTERY OF NATIVE HEART WITH ANGINA PECTORIS (H): ICD-10-CM

## 2022-07-19 LAB
ATRIAL RATE - MUSE: 94 BPM
ATRIAL RATE - MUSE: 94 BPM
DIASTOLIC BLOOD PRESSURE - MUSE: NORMAL MMHG
DIASTOLIC BLOOD PRESSURE - MUSE: NORMAL MMHG
INTERPRETATION ECG - MUSE: NORMAL
INTERPRETATION ECG - MUSE: NORMAL
P AXIS - MUSE: 54 DEGREES
P AXIS - MUSE: 80 DEGREES
PR INTERVAL - MUSE: 124 MS
PR INTERVAL - MUSE: 128 MS
QRS DURATION - MUSE: 86 MS
QRS DURATION - MUSE: 88 MS
QT - MUSE: 346 MS
QT - MUSE: 368 MS
QTC - MUSE: 432 MS
QTC - MUSE: 460 MS
R AXIS - MUSE: 25 DEGREES
R AXIS - MUSE: 37 DEGREES
SYSTOLIC BLOOD PRESSURE - MUSE: NORMAL MMHG
SYSTOLIC BLOOD PRESSURE - MUSE: NORMAL MMHG
T AXIS - MUSE: 114 DEGREES
T AXIS - MUSE: 77 DEGREES
VENTRICULAR RATE- MUSE: 94 BPM
VENTRICULAR RATE- MUSE: 94 BPM

## 2022-07-19 PROCEDURE — 93018 CV STRESS TEST I&R ONLY: CPT | Performed by: INTERNAL MEDICINE

## 2022-07-19 PROCEDURE — G1010 CDSM STANSON: HCPCS | Performed by: INTERNAL MEDICINE

## 2022-07-19 PROCEDURE — 93010 ELECTROCARDIOGRAM REPORT: CPT | Mod: HOP | Performed by: INTERNAL MEDICINE

## 2022-07-19 PROCEDURE — 255N000002 HC RX 255 OP 636: Performed by: INTERNAL MEDICINE

## 2022-07-19 PROCEDURE — A9585 GADOBUTROL INJECTION: HCPCS | Performed by: INTERNAL MEDICINE

## 2022-07-19 PROCEDURE — G1010 CDSM STANSON: HCPCS

## 2022-07-19 PROCEDURE — 93005 ELECTROCARDIOGRAM TRACING: CPT

## 2022-07-19 PROCEDURE — 250N000011 HC RX IP 250 OP 636: Performed by: INTERNAL MEDICINE

## 2022-07-19 PROCEDURE — 75563 CARD MRI W/STRESS IMG & DYE: CPT | Mod: 26 | Performed by: INTERNAL MEDICINE

## 2022-07-19 PROCEDURE — 999N000122 MR MYOCARDIUM  OVERREAD

## 2022-07-19 PROCEDURE — 93016 CV STRESS TEST SUPVJ ONLY: CPT | Performed by: INTERNAL MEDICINE

## 2022-07-19 RX ORDER — GADOBUTROL 604.72 MG/ML
17 INJECTION INTRAVENOUS ONCE
Status: COMPLETED | OUTPATIENT
Start: 2022-07-19 | End: 2022-07-19

## 2022-07-19 RX ORDER — AMINOPHYLLINE 25 MG/ML
50 INJECTION, SOLUTION INTRAVENOUS
Status: ACTIVE | OUTPATIENT
Start: 2022-07-19 | End: 2022-07-19

## 2022-07-19 RX ORDER — REGADENOSON 0.08 MG/ML
0.4 INJECTION, SOLUTION INTRAVENOUS ONCE
Status: COMPLETED | OUTPATIENT
Start: 2022-07-19 | End: 2022-07-19

## 2022-07-19 RX ORDER — ALBUTEROL SULFATE 0.83 MG/ML
2.5 SOLUTION RESPIRATORY (INHALATION)
Status: ACTIVE | OUTPATIENT
Start: 2022-07-19 | End: 2022-07-19

## 2022-07-19 RX ADMIN — REGADENOSON 0.4 MG: 0.08 INJECTION, SOLUTION INTRAVENOUS at 08:35

## 2022-07-19 RX ADMIN — GADOBUTROL 17 ML: 604.72 INJECTION INTRAVENOUS at 08:34

## 2022-07-21 ENCOUNTER — TELEPHONE (OUTPATIENT)
Dept: FAMILY MEDICINE | Facility: CLINIC | Age: 82
End: 2022-07-21

## 2022-07-21 NOTE — TELEPHONE ENCOUNTER
Would like to leave a message for Darlin.    This is regards to her cholesterol injections.     She is having some diarrhea concerns after doing the shot and it is most days.     She is wondering what to do and if there is anything else she can do.    She can be reached at .    Thank you

## 2022-07-21 NOTE — TELEPHONE ENCOUNTER
Attempted to call patient, no answer and LVM.     Reviewed package insert and no significant difference in rates of diarrhea in treatment group vs placebo.      Patient has an upcoming appointment with Darlin Elise, will route information for her review prior to appointment and encouraged patient to reach out again if needed to address problem sooner.    Ashley Arreola, PharmD, BCACP (covering for Darlin Elise PharmD)  Medication Management Pharmacist   [facility]  [phone]

## 2022-07-25 ENCOUNTER — TELEPHONE (OUTPATIENT)
Dept: OTOLARYNGOLOGY | Facility: CLINIC | Age: 82
End: 2022-07-25

## 2022-07-25 NOTE — TELEPHONE ENCOUNTER
Unable to reach on multiple attempts. Left ENT scheduling number for patient to call and make appointment.    SCHEDULING INSTRUCTIONS: Please schedule patient for VIRTUAL SLP appointment. There are specific slots on hold for her. Please note all appointments are to be scheduled VIRTUALLY and w/MARYAN AMBROSIO in [facility] department.  1. NEW SLP VOICE for 1 hr on 07/27 at 3:00 pm   2. RETURN SLP VOICE 8/3 at 3pm and 8/24 at 2pm  3. 1-2 more RETURN SLP VOICE appointments per patient's schedule.       Thank you.

## 2022-07-26 ENCOUNTER — NURSE TRIAGE (OUTPATIENT)
Dept: NURSING | Facility: CLINIC | Age: 82
End: 2022-07-26

## 2022-07-26 DIAGNOSIS — M54.16 LUMBAR RADICULOPATHY: ICD-10-CM

## 2022-07-26 DIAGNOSIS — R19.7 DIARRHEA, UNSPECIFIED TYPE: Primary | ICD-10-CM

## 2022-07-26 RX ORDER — DEXTROAMPHETAMINE SACCHARATE, AMPHETAMINE ASPARTATE, DEXTROAMPHETAMINE SULFATE AND AMPHETAMINE SULFATE 5; 5; 5; 5 MG/1; MG/1; MG/1; MG/1
20 TABLET ORAL 2 TIMES DAILY
Qty: 60 TABLET | Refills: 0 | Status: SHIPPED | OUTPATIENT
Start: 2022-07-26 | End: 2022-11-17

## 2022-07-26 RX ORDER — DIPHENOXYLATE HCL/ATROPINE 2.5-.025MG
1 TABLET ORAL 4 TIMES DAILY PRN
Qty: 30 TABLET | Refills: 1 | Status: SHIPPED | OUTPATIENT
Start: 2022-07-26 | End: 2022-08-16

## 2022-07-26 NOTE — TELEPHONE ENCOUNTER
Last dispense date: 6/22/22-30 days  Pending Prescriptions:                       Disp   Refills    amphetamine-dextroamphetamine (ADDERALL) *60 tab*0            Sig: Take 1 tablet (20 mg) by mouth 2 times daily May           fill 7/26/22    Alvaro

## 2022-07-26 NOTE — TELEPHONE ENCOUNTER
"Nurse Triage SBAR    Is this a 2nd Level Triage? YES, LICENSED PRACTITIONER REVIEW IS REQUIRED  Please call patient at [phone] (home)   Walk In Care was advised patient is requesting message to MD.    Situation:     Patient calling requesting prescription for anti diarrheal. Diarrhea x 5 weeks. Stating she had a prescription from 2018.    Background:     History of bowel surgery  Ulcer   Overflow diarrhea  Assessment:     Symptoms starting 5 weeks ago.   Patient reporting at least 7 stools a day with some incontinence x 5 weeks with no improvement.  Afebrile.  Stools are watery, brown, no form.  Denies blood or mucus in stool.  Nausea. \"Feels hot inside\" abd. Denies pain or cramping.  Stating she has history of similar symptoms and was prescribed \"anti diarrhea.\"   Taking fluids denies change in urine out put.  Advised to be seen in walk in care now. Patient refusing stating Dr Rees is familiar with her symptoms and has prescribed anti diarrheal in past.  Stating over the counter products do not work.      Protocol Recommended Disposition:   Go To ED/UCC Now (Or To Office With PCP Approval)    Recommendation:   Patient is requesting prescription for anti diarrheal. Stating she will not go in anywhere as she has been incontinent.    Routed to provider    Does the patient meet one of the following criteria for ADS visit consideration? 16+ years old, with an [facility] PCP     TIP  Providers, please consider if this condition is appropriate for management at one of our Acute and Diagnostic Services sites.     If patient is a good candidate, please use dotphrase <dot>triageresponse and select Refer to ADS to document.    Reason for Disposition    SEVERE diarrhea (e.g., 7 or more times / day more than normal) and age > 60 years    Additional Information    Negative: Shock suspected (e.g., cold/pale/clammy skin, too weak to stand, low BP, rapid pulse)    Negative: Difficult to awaken or acting confused (e.g., " disoriented, slurred speech)    Negative: Sounds like a life-threatening emergency to the triager    Negative: Vomiting also present and worse than the diarrhea    Negative: Blood in stool and without diarrhea    Negative: SEVERE abdominal pain (e.g., excruciating) and present > 1 hour    Negative: SEVERE abdominal pain and age > 60    Negative: Bloody, black, or tarry bowel movements (Exception: chronic-unchanged black-grey bowel movements and is taking iron pills or Pepto-bismol)    Protocols used: DIARRHEA-A-OH

## 2022-07-26 NOTE — TELEPHONE ENCOUNTER
Received fax from pharmacy requesting refill(s) for     amphetamine-dextroamphetamine (ADDERALL) 20 MG tablet    Date last filled 04.15.2022    Pharmacy:        [facility], [place] [id number] [facility] [place] [facility] [address] [place] [address] Visit Facilitator

## 2022-07-26 NOTE — TELEPHONE ENCOUNTER
Provider Response to 2nd Level Triage Request    I have reviewed the RN documentation. My recommendation is:  Refer to Urgent Care     Lomotil sent in for her. I do recommend that she be seen with how long she has had diarrhea

## 2022-07-27 ENCOUNTER — VIRTUAL VISIT (OUTPATIENT)
Dept: OTOLARYNGOLOGY | Facility: CLINIC | Age: 82
End: 2022-07-27
Payer: MEDICARE

## 2022-07-27 DIAGNOSIS — R49.0 DYSPHONIA: Primary | ICD-10-CM

## 2022-07-27 DIAGNOSIS — J38.01 VOCAL FOLD PARALYSIS, LEFT: ICD-10-CM

## 2022-07-27 PROCEDURE — 92524 BEHAVRAL QUALIT ANALYS VOICE: CPT | Mod: GN | Performed by: SPEECH-LANGUAGE PATHOLOGIST

## 2022-07-27 NOTE — LETTER
"7/27/2022       RE: Sandy Spencer  [address]  [facility] [address]     Dear Colleague,    Thank you for referring your patient, Sandy Spencer, to the [facility]ICE [facility] [place] [facility]. Please see a copy of my visit note below.    [facility] Voice [facility]  Clinical Voice and Upper Airway Evaluation Report    Patient's Name: Sandy Spencer  Date of Evaluation: 7/27/22  Providing SLP: Chantelle Prasad MS CCC-SLP  Referring Provider and Facility: Rigoberto Castillo MD - [facility]  Chief Complaint: Dysphonia  Other in Attendance: her daughter, Pema (minimally)      The patient has been notified and verbally consented to the following statements:     This video visit will be conducted between you and your provider.    Patient has opted to conduct today's video visit vs an in-person appointment, and is not able to attend due to possible exposure to COVID-19.      If during the course of the call the provider feels a video visit is not appropriate, you will not be charged for this service.     Provider has received verbal consent for billable virtual visit from the patient? Yes  Preferred method for receiving information: [employer]  Call initiated at: 3:13 PM  Call ended at: 4:02 PM  Platform used to conduct today's virtual appointment: JAN Ordaz  Location of provider: [facility]  Location of patient: Residence       Patient History: Sandy is a 81-year-old female who presents today for evaluation of dysphonia.     Dysphonia:     Onset: unsure. Possibly January of 2022. Has worsened over the past 2 months when she developed a URI    Complaints: voice sounds gravel and hoarse all of the time \"like I have a cold\"    Patterns: worsens with use but can be better for a short amount of time with voice rest    Primary voice use: phone calls with daughters and sister    Dyspnea: increased " shortness of breath beginning in January    Dysphagia:     Onset and progression: gradually worsening since January    Primary complaint: throat dryness    Cough / Throat Clearing:     Triggers: with talking  Improves with: chewing gum, reducing throat dryness    Medical / Surgical History:    RSD of lower limbs    Coronary artery disease with procedure in 2010    Cardiac issues with significant recent testing    COPD - no inhalers    Kidney disease - only has 1    Back problems - upcoming surgery    Hearing loss - endorses otalgia and difficulties wearing her hearing aids    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    L vocal fold paralysis  Other Medical Evaluations, Testing, and Treatments: will undergo CT head/neck on Monday to further understand vocal fold paralysis  Cognitive Status / Ability to Comprehend Directions: appropriate; however, Sandy reports that her daughters do not want her driving due to her physical decline over the past year  Barriers to Progress: none  Daily Water Intake: recently switched to Gatorade + water mix due to kidney issues - was drinking 48-64 oz of water daily  Caffeine Intake: 16 oz of coffee daily but only 1 cup in the past 10 days - reduced because of metalic taste  Other Beverage Intake: zero calorie Gatorade  Alcohol Intake: very rare today  Past / Current Tobacco Use / Exposure: former - started in 1960 and quit for 10 years and when pregnant - total 20 years - officially quit in 2000 - 1 PPD  Pneumonias: most recent in 2015  Weight: gained back to normal weight after losing lots during kidney surgeries      Quality of Life Questionnaires:    Dyspnea Index 7/27/2022   1. I have trouble getting air in. 2   2. I feel tightness in my throat when I am having eriberto breathing problem. 2   3. It takes more effort to breathe than it used to. 4   4. Change in weather affect my breathing problem. 0   5. My breathing gets worse with stress. 2   6. I make sound/noise breathing in 0  "  7. I have to strain to breathe. 2   8. My shortness of breath gets worse with exercise or physical activity 4   9. My breathing problem makes me feel stressed. 2   10. My breathing problem casuses me to restrict my personal and social life. 2   Dyspnea Index Total Score 20       Patient Supplied Answers To Last 2 VHI Questionnaires  Voice Handicap Index (VHI-10) 7/27/2022   My voice makes it difficult for people to hear me 2   People have difficulty understanding me in a noisy room 2   My voice difficulties restrict my personal and social life.  2   I feel left out of conversations because of my voice 2   My voice problem causes me to lose income 0   I feel as though I have to strain to produce voice 3   The clarity of my voice is unpredictable 2   My voice problem upsets me 2   My voice makes me feel handicapped 2   People ask, \"What's wrong with your voice?\" 3   VHI-10 20       Patient Supplied Answers To Last 2 CSI Questionnaires  Cough Severity Index (CSI) 7/27/2022   My cough is worse when I lie down 1   My coughing problem causes me to restrict my personal and social life 2   I tend to avoid places because of my cough problem 1   I feel embarrassed because of my coughing problem 1   People ask, ''What's wrong?'' because I cough a lot 0   I run out of air when I cough 2   My coughing problem affects my voice 1   My coughing problem limits my physical activity 2   My coughing problem upsets me 2   People ask me if I am sick because I cough a lot 0   CSI Score 12       Patient Supplied Answers To Last 2 EAT Questionnaires  Eating Assessment Tool (EAT-10) 7/27/2022   My swallowing problem has caused me to lose weight 0   My swallowing problem interferes with my ability to go out for meals 0   Swallowing liquids takes extra effort 0   Swallowing solids takes extra effort 1   Swallowing pills takes extra effort 1   Swallowing is painful 2   The pleasure of eating is affected by my swallowing 0   When I swallow " food sticks in my throat 2   I cough when I eat 1   Swallowing is stressful 2   EAT-10 9         Perceptual Analysis (71628): Evaluation of Voice / Speech / Non-Communicative Laryngeal Behaviors    The GRBAS is a perceptual rating of voice change. 0 indicated no impairment, 3 indicates a severe impairment. This is a rating based on clinical judgement of disordered voice quality.  G ( 1 ) General Dysphonia     R ( 1 ) Roughness     B ( 0 ) Breathiness     A ( 1 ) Asthenia     S ( 1 ) Strain    Additional observations:     Coughing / throat clearing: intermittent, following prolonged /s/    Breathing patterns: unable to observe    Overt tension: none observed    Habitual pitch: 175 Hz    Pitch range: 180 - 541 Hz    Maximum phonation time at normal pitch and loudness on /a/ vowel: 10 seconds    S/z ration: 3.83 (stopped because of coughing): 9    Resonance: WNL    Loudness: WNL but over camera so unsure        Laryngoscopy (68688 without stroboscopy):    Laryngoscopy could not be performed today.  Patient opted for a virtual evaluation, as she felt uncomfortable driving to our clinic in [facility].  She underwent a flexible laryngoscopy with Dr. Castillo on 7/14/2022, and this revealed left true vocal fold immobile from a medial position.  Patient was informed that if progress is not made with voice therapy and/or if other concerns arise during the treatment process, repeat laryngoscopy with stroboscopy would be required         Impressions and Plan:     Sandy presents today with dysphonia (R49.0) secondary to L vocal fold paralysis (J38.01). Laryngoscopy with Dr. Castillo on 7/14/22 demonstrated a L true vocal fold immobile from a medial position. Perceptually, Sandy's voice is mildly rough, weak, and strained. Voice therapy has been recommended to optimize her coordination between respiration, phonation, and resonance. Repeat laryngoscopy may be required if she does not progress with voice therapy techniques  alone. She is in agreement with this plan of care.         Goals:    Coordinate phonatory and respiratory subsystems, demonstrating adequate independence for activities of daily communication     Decrease extrinsic laryngeal muscle activity during communication events     Improve voice quality in all activities of daily living using, as measured by a minimum of a 50% point reduction on the Voice Handicap Index-10 or Singing VHI    Demonstrate appropriate vocal hygiene including, but not limited to, adequate hydration, avoiding vocal abuse, integrating behavioral and dietary changes for GERD into their daily life?.     Incorporate behaviors to reduce irritation and promote laryngeal healing and prevent recurrence.?     Implement behavioral strategies to reduce laryngopharyngeal reflux. ?     Independently maintain a forward focus voice placement 90% of the time during conversational speech.    Independently perform the Vocal Function Exercises, rebalancing respiration, phonation, and resonance 90% of the time      Billed Procedures:    No charge facility fee    Perceptual voice assessment 36116      Thank you for allowing me to participate in this patient's care,    Chantelle Prasad MS CCC-SLP  Speech-Language Pathologist  [facility]  [email or web address]                                                                                       Outpatient Speech Language Therapy Evaluation  PLAN OF TREATMENT FOR OUTPATIENT REHABILITATION  (COMPLETE FOR INITIAL CLAIMS ONLY)  Patient's Last Name, First Name, M.I.  YOB: 1940  Sandy Spencer                        Provider's Name  Chantelle Prasad, SLP Medical Record No.  [id number]                               Onset Date:  7/27/22   Start of Care Date: 7/27/22     Type: Speech Language Therapy Medical Diagnosis: Dysphonia [R49.0]                        Therapy Diagnosis:  Dysphonia [R49.0]   Visits from SOC:  1    _________________________________________________________________________________  Plan of Treatment:   Speech therapy    DURATION/FREQUENCY OF TREATMENT  Six weekly, one-hour sessions, with two monthly one-hour follow-up sessions    PROGNOSIS  Good prognosis for voice improvement with speech therapy and regular practice of therapeutic activities.    BARRIERS TO LEARNING/TEACHING AND LEARNING NEEDS  None/Unremarkable    Goals:  Patient goal:   1. To improve and maintain a healthy voice quality    Short-term goal(s): Within the first 4 sessions, Ms. Spencer will:    Decrease extrinsic laryngeal muscle activity during communication events     Improve voice quality in all activities of daily living using, as measured by a minimum of a 50% point reduction on the Voice Handicap Index-10 or Singing VHI    Demonstrate appropriate vocal hygiene including, but not limited to, adequate hydration, avoiding vocal abuse, integrating behavioral and dietary changes for GERD into their daily life?.     Incorporate behaviors to reduce irritation and promote laryngeal healing and prevent recurrence.?     Implement behavioral strategies to reduce laryngopharyngeal reflux. ?     Independently maintain a forward focus voice placement 90% of the time during conversational speech.    Independently perform the Vocal Function Exercises, rebalancing respiration, phonation, and resonance 90% of the time      Long-term goal(s): In 6 months, Ms. Spencer will:    Coordinate phonatory and respiratory subsystems, demonstrating adequate independence for activities of daily communication   _________________________________________________________________________________    I CERTIFY THE NEED FOR THESE SERVICES FURNISHED UNDER        THIS PLAN OF TREATMENT AND WHILE UNDER MY CARE     (Physician attestation of this document indicates review and certification of the therapy plan).     Certification date from: 7/27/22  Certification date to:  10/25/22    Referring Provider: Christine Bennett         Again, thank you for allowing me to participate in the care of your patient.      Sincerely,    Chantelle Prasad, SLP

## 2022-07-27 NOTE — PROGRESS NOTES
Medication Therapy Management (MTM) Encounter    ASSESSMENT:                            Diarrhea: No improvement in diarrhea. I encouraged her to continue regular use of Lomotil, but also she could consider trying Imodium and in addition.  I also reminded her to continue to stay hydrated.  She plans on leaving the stool sample next week.  Discussed that for now, she can hold her magnesium supplements since that may be contributing to her diarrhea for now.  I did review the risk of side effects for Praluent, and diarrhea has a 5% risk.  In addition the timing is suspicious, therefore it is possible that her diarrhea could be linked to the Praluent.  I will reach out to Dr. Ybarra about decreasing the dose back to 75 mg for now with close monitoring (patient does have 1 injection of 75 mg at home and will be due on 8/7/2022).    Insomnia: Some improvement in her insomnia, possible that delta 8 and increase in venlafaxine has helped.  In order to attempt to lessen her trazodone, I encouraged her to try 350 mg nightly.    Edema:  Patient to continue higher dose of torsemide and follow with nephrology.     Hyperlipidemia/PAD: Patient to continue Praluent, but see above regarding increasing the dose back to 75 mg and monitoring for improvement in diarrhea.    Elevated uric acid level: Level on 6/29/2022 is now at goal less than 6.     Hypertension: BP has been at goal <130/80 mmHg due to CKD.  Continue current regimen.     Chronic pain: Continue to follow with the pain clinic.  She is now off fentanyl.  Patient to continue topical therapies and methocarbamol.      Depression: Continue current dose of venlafaxine, appears to have been helpful for her.      PLAN:                            1.  I will reach out to Dr. Lang about temporarily decreasing Praluent to 75 mg   2.  Hold magnesium supplements due to diarrhea at this time    Follow-up: phone follow-up after I hear from Dr. Lang    SUBJECTIVE/OBJECTIVE:                           Sandy Spencer is a 81 year old female called for a follow up visit.     Reason for visit: Follow up since we last spoke 7/1/22  Medication Adherence/Access:  She used to have home care through [facility] but was discharged.  More recently her friend (an RN) has been setting up her medicines for her. Certain oral medications goes right through her -- has 9 inches of her colon. Would like that considered for her medications. She is using [employer] coupon at [employer] for some of her medicines.  Prefers capsules.  She gets Praluent and Elquis through prescription assistance programs.  Reports that she is using Lomotil    Diarrhea: Patient called on 7/21/22 stating that she gets diarrhea after her Praluent then she called again on 7/26/22 and was given Lomotil.  Every 6 hours and it is not helpful.  She went to urgent care yesterday and has not left the stool sample yet, but plans on collecting next week.  She is drinking a lot of Gatorade to avoid dehydration.  In the past Imodium has not been helpful for her.  She reports that the diarrhea started after increasing Praluent almost immediately.  Is needing to go to the bathroom every time she urinates, occurring 24/7, almost to the point of needing diapers.    Insomnia: Continues trazodone 400 mg nightly and zonisamide 25 mg x 2 (50 mg) nightly.  Zonisamide was started on 6/29/2022 by Dr. Lynn.    Reports that she lately has been sleeping fairly well.  She has not been able to decrease to 300 mg of trazodone.  She was given delta 8 from her granddaughters boyfriend who works in a cannabis shop, which she feels has also been helpful for her sleep.   She is no longer taking hydroxyzine, clonidine, doxepin or nortriptyline.  History of sleepwalking with Ambien  Mirtazapine was not helpful    Edema: Currently taking torsemide 10 mg twice daily.  She met with associated nephrology and reports that the dose was increased.  Reports that she had gained 8 pounds and it was  affecting her pain, the fluid has now come off.  She is closely monitoring her weight at home.  Last mag level = 2.5 on 7/8/22    Hyperlipidemia/PAD: Currently taking Praluent 150 mg every 14 days and rosuvastatin 10 mg daily.  Was on rosuvastatin 40 mg daily and stopped due to elevated CK, but 10 mg started on 7/13/22.   Since restarting rosuvastatin, she has not noticed any worsening in myalgias.  Due to her last lipids checked on 4/8/2022, Dr. Ybarra increased her Praluent (see above regarding diarrhea)  She was approved for the prescription assistance program for Praluent.  MRI stress test done 7/19/22  Last CK level = 135 on 3/4/22  Last lipids checked 7/8/22.  Confirms that she has been compliant with the Praluent.    Elevated uric acid level: Level was checked on 6/29/2022 and was 8.2, Dr. Lynn started her on allopurinol 100 mg twice daily.  Reports that she does not drink any juice, pop, meat, or seafood.  She does have joint pain, and a few times has had a sharp sharp pain in her big toe which resolved.  Did not discuss in detail today.  Last uric acid level checked 7/8/22 = 5.3    Hypertension: Continues carvedilol 6.25 mg TWICE DAILY (hold if BP <100/60).   Did not discuss in detail today.  Lisinopril stopped on 2/5/22 due to hypotension.   She does check her blood pressure at home, but we did not have time to discuss in detail today.  ECHO on 3/31/22 shows EF 60%  BP Readings from Last 3 Encounters:   07/28/22 107/71   07/19/22 118/61   07/18/22 101/64       Chronic pain: Currently taking methocarbamol 1500 mg twice daily.  Reports that her pain has been stable.  Underwent a bilateral SI joint steroid injection on 5/23/2022  Follows with the pain and spine clinic.   No longer on fentanyl.  Reports the pain is more significant, but she does not want to go back on fentanyl.  No longer using ketamine, but still has at home   Hx of gabapentin (memory and weight gain) and Lyrica (throat closes)    Depression:  Currently taking venlafaxine  mg daily.  We increased venlafaxine from 75 mg at last MTM appointment on 7/1/2022.  She does feel like the increase has helped her mood.  She feels more content, less edgy.      Today's Vitals: LMP  (LMP Unknown)   ----------------    Allergies/ADRs: Reviewed in chart  Past Medical History: Reviewed in chart  Tobacco: She reports that she quit smoking about 21 years ago. She has a 20.00 pack-year smoking history. She has never used smokeless tobacco.  Alcohol: Reviewed in chart    I spent 25 minutes with this patient today. All changes were made via collaborative practice agreement with Caitlyn Rees MD. A copy of the visit note was provided to the patient's primary care provider.    The patient declined a summary of these recommendations.     Darlin Elise, Pharm.D., BCACP   Medication Therapy Management Pharmacist   [facility]     Telemedicine Visit Details  Type of service:  Telephone visit  Start Time: 11:04 AM  End Time: 11:29 AM  Originating Location (patient location): Home  Distant Location (provider location):  [facility]     Medication Therapy Recommendations  Edema    Current Medication: magnesium oxide 400 MG CAPS (Discontinued)   Rationale: Undesirable effect - Adverse medication event - Safety   Recommendation: Discontinue Medication   Status: Accepted - no CPA Needed         Hyperlipidemia LDL goal <70    Current Medication: alirocumab (PRALUENT) 150 MG/ML injectable pen   Rationale: Undesirable effect - Adverse medication event - Safety   Recommendation: Decrease Dose   Status: Contact Provider - Awaiting Response

## 2022-07-27 NOTE — PROGRESS NOTES
"[facility]  Clinical Voice and Upper Airway Evaluation Report    Patient's Name: Sandy Spencer  Date of Evaluation: 7/27/22  Providing SLP: Chantelle Prasad MS CCC-SLP  Referring Provider and Facility: [facility]  Chief Complaint: Dysphonia  Other in Attendance: her daughter, Pema (minimally)      The patient has been notified and verbally consented to the following statements:     This video visit will be conducted between you and your provider.    Patient has opted to conduct today's video visit vs an in-person appointment, and is not able to attend due to possible exposure to COVID-19.      If during the course of the call the provider feels a video visit is not appropriate, you will not be charged for this service.     Provider has received verbal consent for billable virtual visit from the patient? Yes  Preferred method for receiving information: [employer]  Call initiated at: 3:13 PM  Call ended at: 4:02 PM  Platform used to conduct today's virtual appointment: JAN Damon Video  Location of provider: [facility]  Location of patient: Residence       Patient History: Sandy is a 81-year-old female who presents today for evaluation of dysphonia.     Dysphonia:     Onset: unsure. Possibly January of 2022. Has worsened over the past 2 months when she developed a URI    Complaints: voice sounds gravel and hoarse all of the time \"like I have a cold\"    Patterns: worsens with use but can be better for a short amount of time with voice rest    Primary voice use: phone calls with daughters and sister    Dyspnea: increased shortness of breath beginning in January    Dysphagia:     Onset and progression: gradually worsening since January    Primary complaint: throat dryness    Cough / Throat Clearing:     Triggers: with talking  Improves with: chewing gum, reducing throat dryness    Medical / Surgical History:    RSD of lower " limbs    Coronary artery disease with procedure in 2010    Cardiac issues with significant recent testing    COPD - no inhalers    Kidney disease - only has 1    Back problems - upcoming surgery    Hearing loss - endorses otalgia and difficulties wearing her hearing aids    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    L vocal fold paralysis  Other Medical Evaluations, Testing, and Treatments: will undergo CT head/neck on Monday to further understand vocal fold paralysis  Cognitive Status / Ability to Comprehend Directions: appropriate; however, Sandy reports that her daughters do not want her driving due to her physical decline over the past year  Barriers to Progress: none  Daily Water Intake: recently switched to Gatorade + water mix due to kidney issues - was drinking 48-64 oz of water daily  Caffeine Intake: 16 oz of coffee daily but only 1 cup in the past 10 days - reduced because of metalic taste  Other Beverage Intake: zero calorie Gatorade  Alcohol Intake: very rare today  Past / Current Tobacco Use / Exposure: former - started in 1960 and quit for 10 years and when pregnant - total 20 years - officially quit in 2000 - 1 PPD  Pneumonias: most recent in 2015  Weight: gained back to normal weight after losing lots during kidney surgeries      Quality of Life Questionnaires:    Dyspnea Index 7/27/2022   1. I have trouble getting air in. 2   2. I feel tightness in my throat when I am having eriberto breathing problem. 2   3. It takes more effort to breathe than it used to. 4   4. Change in weather affect my breathing problem. 0   5. My breathing gets worse with stress. 2   6. I make sound/noise breathing in 0   7. I have to strain to breathe. 2   8. My shortness of breath gets worse with exercise or physical activity 4   9. My breathing problem makes me feel stressed. 2   10. My breathing problem casuses me to restrict my personal and social life. 2   Dyspnea Index Total Score 20       Patient Supplied Answers To  "Last 2 VHI Questionnaires  Voice Handicap Index (VHI-10) 7/27/2022   My voice makes it difficult for people to hear me 2   People have difficulty understanding me in a noisy room 2   My voice difficulties restrict my personal and social life.  2   I feel left out of conversations because of my voice 2   My voice problem causes me to lose income 0   I feel as though I have to strain to produce voice 3   The clarity of my voice is unpredictable 2   My voice problem upsets me 2   My voice makes me feel handicapped 2   People ask, \"What's wrong with your voice?\" 3   VHI-10 20       Patient Supplied Answers To Last 2 CSI Questionnaires  Cough Severity Index (CSI) 7/27/2022   My cough is worse when I lie down 1   My coughing problem causes me to restrict my personal and social life 2   I tend to avoid places because of my cough problem 1   I feel embarrassed because of my coughing problem 1   People ask, ''What's wrong?'' because I cough a lot 0   I run out of air when I cough 2   My coughing problem affects my voice 1   My coughing problem limits my physical activity 2   My coughing problem upsets me 2   People ask me if I am sick because I cough a lot 0   CSI Score 12       Patient Supplied Answers To Last 2 EAT Questionnaires  Eating Assessment Tool (EAT-10) 7/27/2022   My swallowing problem has caused me to lose weight 0   My swallowing problem interferes with my ability to go out for meals 0   Swallowing liquids takes extra effort 0   Swallowing solids takes extra effort 1   Swallowing pills takes extra effort 1   Swallowing is painful 2   The pleasure of eating is affected by my swallowing 0   When I swallow food sticks in my throat 2   I cough when I eat 1   Swallowing is stressful 2   EAT-10 9         Perceptual Analysis (13316): Evaluation of Voice / Speech / Non-Communicative Laryngeal Behaviors    The GRBAS is a perceptual rating of voice change. 0 indicated no impairment, 3 indicates a severe impairment. This " is a rating based on clinical judgement of disordered voice quality.  G ( 1 ) General Dysphonia     R ( 1 ) Roughness     B ( 0 ) Breathiness     A ( 1 ) Asthenia     S ( 1 ) Strain    Additional observations:     Coughing / throat clearing: intermittent, following prolonged /s/    Breathing patterns: unable to observe    Overt tension: none observed    Habitual pitch: 175 Hz    Pitch range: 180 - 541 Hz    Maximum phonation time at normal pitch and loudness on /a/ vowel: 10 seconds    S/z ration: 3.83 (stopped because of coughing): 9    Resonance: WNL    Loudness: WNL but over camera so unsure        Laryngoscopy (99693 without stroboscopy):    Laryngoscopy could not be performed today.  Patient opted for a virtual evaluation, as she felt uncomfortable driving to our clinic in [facility].  She underwent a flexible laryngoscopy with Dr. Castillo on 7/14/2022, and this revealed left true vocal fold immobile from a medial position.  Patient was informed that if progress is not made with voice therapy and/or if other concerns arise during the treatment process, repeat laryngoscopy with stroboscopy would be required         Impressions and Plan:     Sandy presents today with dysphonia (R49.0) secondary to L vocal fold paralysis (J38.01). Laryngoscopy with Dr. Castillo on 7/14/22 demonstrated a L true vocal fold immobile from a medial position. Perceptually, Sandy's voice is mildly rough, weak, and strained. Voice therapy has been recommended to optimize her coordination between respiration, phonation, and resonance. Repeat laryngoscopy may be required if she does not progress with voice therapy techniques alone. She is in agreement with this plan of care.         Goals:    Coordinate phonatory and respiratory subsystems, demonstrating adequate independence for activities of daily communication     Decrease extrinsic laryngeal muscle activity during communication events     Improve voice quality in all activities of  daily living using, as measured by a minimum of a 50% point reduction on the Voice Handicap Index-10 or Singing VHI    Demonstrate appropriate vocal hygiene including, but not limited to, adequate hydration, avoiding vocal abuse, integrating behavioral and dietary changes for GERD into their daily life?.     Incorporate behaviors to reduce irritation and promote laryngeal healing and prevent recurrence.?     Implement behavioral strategies to reduce laryngopharyngeal reflux. ?     Independently maintain a forward focus voice placement 90% of the time during conversational speech.    Independently perform the Vocal Function Exercises, rebalancing respiration, phonation, and resonance 90% of the time      Billed Procedures:    No charge facility fee    Perceptual voice assessment 67113      Thank you for allowing me to participate in this patient's care,    Chantelle Prasad MS CCC-SLP  Speech-Language Pathologist  [facility]  [email or web address]

## 2022-07-28 ENCOUNTER — HOSPITAL ENCOUNTER (OUTPATIENT)
Dept: SPEECH THERAPY | Facility: REHABILITATION | Age: 82
Discharge: HOME | End: 2022-07-28
Payer: MEDICARE

## 2022-07-28 ENCOUNTER — TELEPHONE (OUTPATIENT)
Dept: FAMILY MEDICINE | Facility: CLINIC | Age: 82
End: 2022-07-28

## 2022-07-28 ENCOUNTER — OFFICE VISIT (OUTPATIENT)
Dept: FAMILY MEDICINE | Facility: CLINIC | Age: 82
End: 2022-07-28
Payer: MEDICARE

## 2022-07-28 ENCOUNTER — HOSPITAL ENCOUNTER (OUTPATIENT)
Dept: OCCUPATIONAL THERAPY | Facility: REHABILITATION | Age: 82
Discharge: HOME | End: 2022-07-28
Payer: MEDICARE

## 2022-07-28 VITALS
RESPIRATION RATE: 18 BRPM | DIASTOLIC BLOOD PRESSURE: 71 MMHG | HEART RATE: 84 BPM | SYSTOLIC BLOOD PRESSURE: 107 MMHG | TEMPERATURE: 98.8 F | BODY MASS INDEX: 27.81 KG/M2 | WEIGHT: 157 LBS | OXYGEN SATURATION: 96 %

## 2022-07-28 DIAGNOSIS — G93.31 POSTVIRAL FATIGUE SYNDROME: Primary | ICD-10-CM

## 2022-07-28 DIAGNOSIS — R19.7 DIARRHEA, UNSPECIFIED TYPE: Primary | ICD-10-CM

## 2022-07-28 DIAGNOSIS — R05.9 COUGH: ICD-10-CM

## 2022-07-28 DIAGNOSIS — N18.4 CKD (CHRONIC KIDNEY DISEASE) STAGE 4, GFR 15-29 ML/MIN (H): ICD-10-CM

## 2022-07-28 DIAGNOSIS — Z78.9 DECREASED ACTIVITIES OF DAILY LIVING (ADL): ICD-10-CM

## 2022-07-28 DIAGNOSIS — R41.841 COGNITIVE COMMUNICATION DEFICIT: Primary | ICD-10-CM

## 2022-07-28 LAB
ANION GAP SERPL CALCULATED.3IONS-SCNC: 11 MMOL/L (ref 5–18)
BASOPHILS # BLD AUTO: 0 10E3/UL (ref 0–0.2)
BASOPHILS NFR BLD AUTO: 1 %
BUN SERPL-MCNC: 23 MG/DL (ref 8–28)
CALCIUM SERPL-MCNC: 9.3 MG/DL (ref 8.5–10.5)
CHLORIDE BLD-SCNC: 104 MMOL/L (ref 98–107)
CO2 SERPL-SCNC: 22 MMOL/L (ref 22–31)
CREAT SERPL-MCNC: 1.83 MG/DL (ref 0.6–1.1)
EOSINOPHIL # BLD AUTO: 0 10E3/UL (ref 0–0.7)
EOSINOPHIL NFR BLD AUTO: 0 %
ERYTHROCYTE [DISTWIDTH] IN BLOOD BY AUTOMATED COUNT: 15.6 % (ref 10–15)
GFR SERPL CREATININE-BSD FRML MDRD: 28 ML/MIN/1.73M2
GLUCOSE BLD-MCNC: 92 MG/DL (ref 70–125)
HCT VFR BLD AUTO: 39.3 % (ref 35–47)
HGB BLD-MCNC: 12.9 G/DL (ref 11.7–15.7)
IMM GRANULOCYTES # BLD: 0 10E3/UL
IMM GRANULOCYTES NFR BLD: 0 %
LYMPHOCYTES # BLD AUTO: 1.4 10E3/UL (ref 0.8–5.3)
LYMPHOCYTES NFR BLD AUTO: 24 %
MCH RBC QN AUTO: 31.1 PG (ref 26.5–33)
MCHC RBC AUTO-ENTMCNC: 32.8 G/DL (ref 31.5–36.5)
MCV RBC AUTO: 95 FL (ref 78–100)
MONOCYTES # BLD AUTO: 0.8 10E3/UL (ref 0–1.3)
MONOCYTES NFR BLD AUTO: 13 %
NEUTROPHILS # BLD AUTO: 3.8 10E3/UL (ref 1.6–8.3)
NEUTROPHILS NFR BLD AUTO: 63 %
PLATELET # BLD AUTO: 166 10E3/UL (ref 150–450)
POTASSIUM BLD-SCNC: 3.6 MMOL/L (ref 3.5–5)
RBC # BLD AUTO: 4.15 10E6/UL (ref 3.8–5.2)
SODIUM SERPL-SCNC: 137 MMOL/L (ref 136–145)
WBC # BLD AUTO: 6.1 10E3/UL (ref 4–11)

## 2022-07-28 PROCEDURE — 80048 BASIC METABOLIC PNL TOTAL CA: CPT | Performed by: FAMILY MEDICINE

## 2022-07-28 PROCEDURE — 99215 OFFICE O/P EST HI 40 MIN: CPT | Performed by: FAMILY MEDICINE

## 2022-07-28 PROCEDURE — 97535 SELF CARE MNGMENT TRAINING: CPT | Mod: GO | Performed by: OCCUPATIONAL THERAPIST

## 2022-07-28 PROCEDURE — 92523 SPEECH SOUND LANG COMPREHEN: CPT | Mod: GN | Performed by: SPEECH-LANGUAGE PATHOLOGIST

## 2022-07-28 PROCEDURE — 36415 COLL VENOUS BLD VENIPUNCTURE: CPT | Performed by: FAMILY MEDICINE

## 2022-07-28 PROCEDURE — 97165 OT EVAL LOW COMPLEX 30 MIN: CPT | Mod: GO | Performed by: OCCUPATIONAL THERAPIST

## 2022-07-28 PROCEDURE — 85025 COMPLETE CBC W/AUTO DIFF WBC: CPT | Performed by: FAMILY MEDICINE

## 2022-07-28 NOTE — PROGRESS NOTES
[facility]          OUTPATIENT SPEECH LANGUAGE PATHOLOGY LANGUAGE-COGNITION  EVALUATION  PLAN OF TREATMENT FOR OUTPATIENT REHABILITATION  (COMPLETE FOR INITIAL CLAIMS ONLY)  Patient's Last Name, First Name, M.I.  YOB: 1940  Sandy Spencer                        Provider s Name: [facility] Medical Record No.  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 Goal Identifier: (P) 1       Goal Description: (P) Patient will learn and demonstrate use of x3 cognitive-linguistic compensatory strategies by end of POC.       Target Date: (P) 09/28/22   2. Goal Identifier: (P) 2       Goal Description: (P) Patient will complete functional memory/attention tasks with >90% accuracy with strategy use.       Target Date: (P) 09/28/22        Predicted Duration of Therapy Intervention (days/wks): 1x per week for up to 6 visits    Augusta Ramirez, SLP       I CERTIFY THE NEED FOR THESE SERVICES FURNISHED UNDER        THIS PLAN OF TREATMENT AND WHILE UNDER MY CARE     (Physician co-signature of this document indicates review and certification of the therapy plan).                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 Per EMR, Dr. Bennett's visit note on 6-14-22 states \"Continuing symptoms include chills, sore throat, trouble swallowing, fatigue, generalized aches, weakness, cough, shortness of breath, headache, palpitations, dizziness, heart racing, difficulty sleeping, brain fog, distractibility and diarrhea  Patient feels cold all the time and feels her legs. Patient is exhausted all the time, she feels has increased trouble sleeping. Patient has seen many specialists including pulmonology, cardiology, ENT and sleep clinic. Patient states that she can no longer hear in her left ear and has a metal taste in her mouth. She reports fatigue and concentration deficits.   Patient reports changes in  tracking  information (also could be r/t hearing loss).  She was seen by ENT on 7/14/22 with  Left TVC immobile in median position  noted, will additionally be seeing a voice therapist, but expressed difficulty driving to clinic location vs virtual sessions.  Patient additionally noted concerns with swallowing.  She denied coughing/choking with intake, but rather has to have a more  concentrated effort  to swallow and finds herself with a sore throat and mouth sores.   Patient Role/employment History Retired   General Observations Patient engaged during evaluation.  Easily distracted and off topic but able to be redirected.   Patient/family Goals \"get better\"   Fall Risk Screen   Fall " screen completed by SLP   Have you fallen 2 or more times in the past year? No   Have you fallen and had an injury in the past year? Yes   Is patient a fall risk? Yes   Fall screen comments Falls handout provided   Abuse Screen (yes response referral indicated)   Feels Unsafe at Home or Work/School no   Feels Threatened by Someone no   Does Anyone Try to Keep You From Having Contact with Others or Doing Things Outside Your Home? no   Physical Signs of Abuse Present no   Patient needs abuse support services and resources No   Pain Assessment   Pain Reported Yes   Pain Scale 5/10   Speech   Speech Comments Patient's speech is 100% intelligible in conversation.  Moderately hoarse vocal quality noted- currently in voice therapy.   Language: Auditory Comprehension (understanding of spoken language)   Comments (Auditory Comprehension) Not formally assessed.  Patient able to answer questions and follow directions during evaluation.   Language: Verbal Expression (use of spoken language to express information)   Comments (Verbal Expression) Patient scored WNL per the Language domain of the Cognitive Linguistic Quick Test administered on today's date. Patient able to describe/explain without overt word finding deficits.   Cognitive Status Examination   Standardized cognitive-linguistic assessment completed yes;CLQT   Cognitive Status Exam Comments Patient scored overall WNL per CLQT completed, mild deficits in memory.  See attached progress note for scores/interpretation.  The [place] Post-Concussion (Covid) Symptoms Questionnaire was administered with the following rating scale: 0- Not experienced at all, 1- No more of a problem, 2- a mild problem, 3- a moderate problem, 4- a severe problem.  The patient reported the followin- double vision, 1- taking longer to think, 2- headaches, nausea, poor memory, 3- noise sensitivity, depression, poor concentration, light sensitivity, restlessness.  4- Lightheaded, sleep  disturbance, fatigue, irritability, frustration.   Education Assessment   Barriers to Learning Cognitive;Physical;Emotional   General Therapy Interventions   Planned Therapy Interventions Cognitive Treatment   Cognitive treatment Internal memory strategy training;External memory strategy training;Progressive attention training  (Organizational strategies)   Clinical Impression, SLP Aleksandr   Criteria for Skilled Therapeutic Interventions Met (SLP Eval) Yes, treatment indicated   SLP Diagnosis Cognitive Communication Deficit   Predicted Duration of Therapy Intervention (days/wks) 1x per week for up to 6 visits   Risks and Benefits of Treatment have been explained. Yes   Patient, Family & other staff in agreement with plan of care Yes   Clinical Impression Comments Patient presents with linguistic-cognitive inefficiencies in attention, organization, and memory. These appear to be secondary to long covid and exacerbated by physical symptoms, significant fatigue and emotional component. These cognitive-linguistic inefficiencies influence patient's ability and endurance to fully participate and complete functional daily tasks at work and at home. Speech language therapy is appropriate to address inefficiencies by direct education and implementation of cognitive-linguistic compensatory strategies to manage symptoms. Patient participated in education regarding treatment plan/rationale, home program and expected functional outcome and verbalized understanding. Rehab potential is good based on prior level of function, motivation and cooperation, and with multidisciplinary approach.   Cognitive/Communication Goal 1   Goal Identifier 1   Goal Description Patient will learn and demonstrate use of x3 cognitive-linguistic compensatory strategies by end of POC.   Target Date 09/28/22   Cognitive/Communication Goal 2   Goal Identifier 2   Goal Description Patient will complete functional memory/attention tasks with >90% accuracy with  strategy use.   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  (Physician co-signature of this document indicates review and certification of the therapy plan).       Speech Language Pathology     Cognitive Linguistic Quick Test (CLQT)    SUMMARY OF TEST:    The CLQT assesses visual attention and perception, working memory and language output skills, as well as auditory memory and comprehension.  Non-linguistic tasks can help assess planning, and self-monitoring, visual discrimination and analysis, as well as creativity and mental flexibility.   Together, these subtests assess the cognitive domains of attention, memory, executive function, language, and visuospatial skills using a severity rating of either WNL (within normal limits), Mild, Moderate or Severe.    RESULTS OF TESTING:   Attention    Score: 168    Severity Rating: WNL    Memory    Score: 130    Severity Rating: Mild    Executive Functions    Score: 24    Severity Rating: WNL    Language    Score: 35    Severity Rating: WNL   Visuospatial Skills    Score: 62    Severity Rating: WNL    Composite Severity Rating    Score: 3.8    Severity Rating: WNL    Clock Drawing     Score: 12    Severity Rating: WNL     INTERPRETATION OF TEST RESULTS: Patient scored overall cognitive-linguistic abilities WNL, with mild deficits in memory.  Of note, patient scored high average on 3/4 memory subtests, with difficulty in design memory.  Areas of strength included personal facts, symbol cancellation, confrontation naming, story retelling, and generative naming.  Areas of challenge included  symbol trails (errors on first two prep attempts), design memory (impulsivity), and mazes (completed small maze out of time frame).  TIME ADMINISTERING TEST: 30  TIME FOR INTERPRETATION AND PREPARATION OF REPORT: 20  TOTAL TIME: 50  Reference:  Page Esquivel, Herminio, CCC-SLP, (2001) [employer]

## 2022-07-28 NOTE — PROGRESS NOTES
[facility]          OUTPATIENT OCCUPATIONAL THERAPY  EVALUATION  PLAN OF TREATMENT FOR OUTPATIENT REHABILITATION  (COMPLETE FOR INITIAL CLAIMS ONLY)  Patient's Last Name, First Name, M.I.  YOB: 1940  Sadny Spencer                        Provider's Name  [facility] Medical Record No.  [id number]                               Onset Date:     05/10/20   Start of Care Date:     07/28/22   Type:     ___PT   _X_OT   ___SLP Medical Diagnosis:     post Covid concentration deficit                          OT Diagnosis:     decreased ADL and IADL's, fatigue, memory changes Visits from SOC:  1   _________________________________________________________________________________  Plan of Treatment/Functional Goals:  ADL training, IADL training, Self care/Home management      Goals  Goal Description: Patient will demonstrate knowledge of fatigue management strategies to increaase ease of ADL and IADL's  Target Date: 09/26/22    Goal Description: Patient will demonstrate knowledge of compensatory strategies for memory changes  Target Date: 09/26/22    Therapy Frequency: 1-3 visits     Predicted Duration of Therapy Intervention (days/wks): 12 weeks  Amarilys Jolly OT          I CERTIFY THE NEED FOR THESE SERVICES FURNISHED UNDER        THIS PLAN OF TREATMENT AND WHILE UNDER MY CARE     (Physician co-signature of this document indicates review and certification of the therapy plan).              Certification date from: 07/28/22, Certification date to: 10/26/22               Referring Physician: Dr. Christine Bennett     Initial Assessment        See Epic Evaluation      Start Of Care Date: 07/28/22 07/28/22 1200   Quick Adds   Quick Adds Certification   Type of Visit Initial Outpatient Occupational Therapy Evaluation   General Information   Start Of Care Date 07/28/22  "  Referring Physician Dr. Christine Bennett   Orders Evaluate and treat as indicated   Orders Date 06/14/22   Medical Diagnosis post Covid concentration deficit   Onset of Illness/Injury or Date of Surgery 05/10/20   Precautions/Limitations Fall precautions   Surgical/Medical History Reviewed Yes   Additional Occupational Profile Info/Pertinent History of Current Problem Patient had initial Covid infection 5-10-20 with a second Covid infection in December 2020. Per EMR, Dr. Bennett's visit note on 6-14-22 states \"Continuing symptoms include chills, sore throat, trouble swallowing, fatigue, generalized aches, weakness, cough, shortness of breath, headache, palpitations, dizziness, heart racing, difficulty sleeping, brain fog, distractibility and diarrhea  Patient feels cold all the time and feels her legs. Patient is exhausted all the time, she feels has increased trouble sleeping. Patient has seen many specialists including pulmonology, cardiology, ENT and sleep clinic. Patient states that she can no longer hear in her left ear and has a metal taste in her mouth. She reports fatigue and concentration deficits.   Role/Living Environment   Role/Living Environment Comments Patient lives alone in one level [facility]. She is retired.   Pain   Patient currently in pain Yes   Pain location right shoulder, left leg   Pain rating 5/10   Fall Risk Screen   Fall screen completed by OT   Have you fallen 2 or more times in the past year? No   Have you fallen and had an injury in the past year? Yes   Is patient a fall risk? Yes   Fall screen comments Patient feels lightheaded at times and this puts her at risk for falling   Abuse Screen (yes response referral indicated)   Feels Unsafe at Home or Work/School no   Feels Threatened by Someone no   Does Anyone Try to Keep You From Having Contact with Others or Doing Things Outside Your Home? no   Physical Signs of Abuse Present no   Patient needs abuse support services and resources No "   Cognitive Status Examination   Orientation Orientation to person, place and time   Level of Consciousness Alert   Follows Commands and Answers Questions 100% of the time   Cognitive Comment plan to further assess   Visual Perception   Visual Perception Comments Patient reports no deficits in vision   Sensation   Sensation Comments Patient reports normal sensation but states that has cold hands and feet   Hand Strength   Hand Dominance Right   Transfer Skill   Level of Evans: Transfers independent   Bathing   Level of [place] - Bathing independent   Assistive Device grab bars   Upper Body Dressing   Level of [place]: Dress Upper Body independent   Lower Body Dressing   Level of Evans: Dress Lower Body independent   Toileting   Level of Evans: Toilet independent   Grooming   Level of Evans: Grooming independent   Eating/Self-Feeding   Level of Evans: Eating independent   Instrumental Activities of Daily Living Assessment   IADL Assessment/Observations Patient is independent with laundry, cooking and cleaning(except vacuuming). Patient is independent with medication and finances.Patient is driving only short distances   Planned Therapy Interventions   Planned Therapy Interventions ADL training;IADL training;Self care/Home management    OT Goal 1   Goal Description Patient will demonstrate knowledge of fatigue management strategies to increaase ease of ADL and IADL's   Target Date 09/26/22    OT Goal 2   Goal Description Patient will demonstrate knowledge of compensatory strategies for memory changes   Target Date 09/26/22   Clinical Impression   Criteria for Skilled Therapeutic Interventions Met Yes, treatment indicated   OT Diagnosis decreased ADL and IADL's, fatigue, memory changes   Clinical Decision Making (Complexity) Low complexity   Therapy Frequency 1-3 visits   Predicted Duration of Therapy Intervention (days/wks) 12 weeks   Risks and Benefits of Treatment have  been explained. Yes   Patient, Family & other staff in agreement with plan of care Yes   Education Assessment   Barriers To Learning No Barriers   Therapy Certification   Certification date from 07/28/22   Certification date to 10/26/22   Certification I certify the need for these services furnished under this plan of treatment and while under my care.  (Physician co-signature of this document indicates review and certification of the therapy plan)   Total Evaluation Time   OT Eval, Low Complexity Minutes (14118) 30

## 2022-07-28 NOTE — ADDENDUM NOTE
Encounter addended by: Amarilys Jolly, OT on: 7/28/2022 2:12 PM   Actions taken: Document created, Document edited

## 2022-07-28 NOTE — TELEPHONE ENCOUNTER
Pt scheduled with Sloan Hussein.   Walk in care discussed again for ongoing diarrhea. This was discussed 7/26/22 in nurse triage note.

## 2022-07-28 NOTE — TELEPHONE ENCOUNTER
Called pt and pt stated that they would like to be seen as soon as possible by Dr. Rees. Pt stated that she would like labs done for kidney function, along with a 'swab' of her mouth done. Pt also states she has concerns about constant diarrhea. Pt also stated that she had discussed with provider about a biopsy of her left leg d/t sciatica.  Pt would like to be called to see if provider can order labs and discuss these issues.      Pt also stated concerns with post covid, upper respiratory infection that pt was seen at [facility] for, and concerns about paralyzed L vocal cord.

## 2022-07-29 ENCOUNTER — VIRTUAL VISIT (OUTPATIENT)
Dept: PHARMACY | Facility: CLINIC | Age: 82
End: 2022-07-29
Payer: COMMERCIAL

## 2022-07-29 DIAGNOSIS — R60.1 GENERALIZED EDEMA: ICD-10-CM

## 2022-07-29 DIAGNOSIS — G47.00 INSOMNIA, UNSPECIFIED TYPE: ICD-10-CM

## 2022-07-29 DIAGNOSIS — I25.10 CORONARY ARTERY DISEASE INVOLVING NATIVE CORONARY ARTERY OF NATIVE HEART WITHOUT ANGINA PECTORIS: ICD-10-CM

## 2022-07-29 DIAGNOSIS — F06.4 ANXIETY DISORDER DUE TO MEDICAL CONDITION: ICD-10-CM

## 2022-07-29 DIAGNOSIS — M25.541 ARTHRALGIA OF BOTH HANDS: ICD-10-CM

## 2022-07-29 DIAGNOSIS — G89.4 CHRONIC PAIN SYNDROME: ICD-10-CM

## 2022-07-29 DIAGNOSIS — E79.0 ELEVATED URIC ACID IN BLOOD: ICD-10-CM

## 2022-07-29 DIAGNOSIS — R19.7 OVERFLOW DIARRHEA: Primary | ICD-10-CM

## 2022-07-29 DIAGNOSIS — M25.542 ARTHRALGIA OF BOTH HANDS: ICD-10-CM

## 2022-07-29 DIAGNOSIS — E78.5 DYSLIPIDEMIA, GOAL LDL BELOW 70: ICD-10-CM

## 2022-07-29 DIAGNOSIS — E78.5 HYPERLIPIDEMIA LDL GOAL <70: ICD-10-CM

## 2022-07-29 DIAGNOSIS — I15.1 HYPERTENSION SECONDARY TO OTHER RENAL DISORDERS: ICD-10-CM

## 2022-07-29 PROCEDURE — 99606 MTMS BY PHARM EST 15 MIN: CPT | Performed by: PHARMACIST

## 2022-07-29 PROCEDURE — 99607 MTMS BY PHARM ADDL 15 MIN: CPT | Performed by: PHARMACIST

## 2022-07-29 RX ORDER — ALLOPURINOL 100 MG/1
100 TABLET ORAL 2 TIMES DAILY
Qty: 180 TABLET | Refills: 1 | Status: SHIPPED | OUTPATIENT
Start: 2022-07-29 | End: 2023-01-24

## 2022-07-29 NOTE — PATIENT INSTRUCTIONS
[place] diet.  Hydrate to replace fluid losses.   Labs pending, [facility] call if abnormal [facility]     Stool studies as ordered.   May take lomotil sparingly before going out etc. Limit to twice a day to avoid the risk of constipation.     Chest xray is reassuring.

## 2022-07-29 NOTE — PROGRESS NOTES
Assessment/Plan:   Diarrhea, unspecified type  CKD (chronic kidney disease) stage 4, GFR 15-29 ml/min (H)  Cough, persistent  Exacerbated diarrhea for about a month - mostly liquid and explosive. Consider infectious or post infectious viral, related to actual constipation, malabsorption, medication side effect. Considered ED evaluation for immediate labs and IVF however she was hemodynamically stable so outpatient evaluation initiated.   Will check stool studies for cdiff, enteric panel, h.pylori given persistent GERD and gastric symptoms.   Will also check CBC and BMP to screen for electrolyte abnormality.   Since she is still coughing after URI 6 weeks ago, we will check CXR.   - Enteric Bacteria and Virus Panel by [facility] Stool; Future  - Helicobacter pylori Antigen Stool; Future  - Clostridium difficile Toxin B PCR; Future  - CBC with platelets and differential  - Basic metabolic panel  (Ca, Cl, CO2, Creat, Gluc, K, Na, BUN)  - XR Chest 2 Views; Future    I discussed red flag symptoms, return precautions to clinic/ER and follow up care with patient/parent.  Expected clinical course, symptomatic cares advised. Questions answered. Patient/parent amenable with plan.    [place] diet.  Hydrate to replace fluid losses.   Labs pending, I will call if abnormal or send note to [facility] if reassuring.     Stool studies as ordered.   May take lomotil sparingly before going out etc. Limit to twice a day to avoid the risk of constipation.     Chest xray is reassuring.   Follow up as planned with primary care.     Time: total time on day of visit 40 minutes spent in chart review, obtaining patient history and physical examination, evaluation of lab and xray results, shared decision making and coordination of care.     Subjective:     Sandy Spencer is a 81 year old female with history of renal failure, CAD, chronic pain, GERD and chronic diarrhea who presents for evaluation of worse diarrhea for the last month.   The diarrhea is  "explosive, 6-8 times in the day and again 6-8 times at night. Taking lomotil. She has withheld eating to lessen diarrhea.   Light yellow liquid, sometimes brown. No blood.   Feels hot in her colon.   No fever, no N/V. No abdominal pain except some epigastric distress at times, history of GERD  She has had no recent antibiotics in the month or so preceding onset of diarrhea.   She has started a few medications in the past month or two.  An injectable cholesterol medication is being given per cardiology every two weeks. The dose of that was increased 6 weeks ago. Has also started taking crestor and has been taking allopurinol for a month.   She has history of subtotal colectomy for 'ulcers on the ileum'.  She had acute respiratory infection 6 weeks ago. Still coughing though other symptoms better. She has 'post-covid' from a prior covid infection.   She has a single kidney and renal insufficiency and a history of abnormal electrolytes.   She has multiple medication allergies and intolerances    Allergies   Allergen Reactions     Acamprosate Other (See Comments), Headache and Nausea and Vomiting     Augmentin GI Disturbance     Carbamazepine Other (See Comments)     Patient felt \"drunk\".      Cyclobenzaprine Shortness Of Breath, Other (See Comments) and Unknown     Mouth ulcers and sores per pt       Mirtazapine      Other reaction(s): slowed breathing     Prednisone      Pregabalin Shortness Of Breath, Other (See Comments), Anaphylaxis and Unknown     Throat closes per pt       Sulfa Drugs Shortness Of Breath, Anaphylaxis and Unknown     Sulfamethoxazole-Trimethoprim      Other reaction(s): Respiratory problems, e.g., wheezingDermatological problems, e.g., rash, hives     Zolpidem Other (See Comments)     Sleep walking       Acetaminophen Other (See Comments)     Rebound headaches       Amiloride Swelling and Unknown     Amoxicillin Diarrhea, Nausea and Vomiting and Unknown     Aspirin Other (See Comments)     History " of gastric ulcers and instructed to not take more than 81 mg/d, 10/5/17 takes 81 mg at home  Stomach        Bupropion Other (See Comments)     Dizziness, confusion, fell 3 times. Mental Confusion.      Chromium Other (See Comments)     Staples: Reaction to all metals.States serious reactions after surgery        Duloxetine Hcl Unknown     Nsaids Other (See Comments)     H/o Stomach ulcers       Other Drug Allergy (See Comments)      Jass: turned black into the groin, was told to never have staples again     Oxycodone Headache     Serotonin Other (See Comments)     Sulindac      Tolmetin Other (See Comments) and Unknown     History of ulcer       Verapamil Other (See Comments)     Bradycardia     Valproic Acid Rash     Current Outpatient Medications   Medication     alirocumab (PRALUENT) 150 MG/ML injectable pen     allopurinol (ZYLOPRIM) 100 MG tablet     amphetamine-dextroamphetamine (ADDERALL) 20 MG tablet     apixaban ANTICOAGULANT (ELIQUIS) 5 MG tablet     azelastine (ASTEPRO) 0.15 % nasal spray     carvedilol (COREG) 6.25 MG tablet     Cholecalciferol (VITAMIN D-3) 125 MCG (5000 UT) TABS     diphenoxylate-atropine (LOMOTIL) 2.5-0.025 MG tablet     HEMP OIL OR EXTRACT OR OTHER CBD CANNABINOID, NOT MEDICAL CANNABIS,     levothyroxine (SYNTHROID/LEVOTHROID) 50 MCG tablet     lidocaine (LIDODERM) 5 % patch     magnesium oxide 400 MG CAPS     methocarbamol (ROBAXIN) 500 MG tablet     olopatadine (PATANOL) 0.1 % ophthalmic solution     rosuvastatin (CRESTOR) 10 MG tablet     torsemide (DEMADEX) 5 MG tablet     traZODone (DESYREL) 100 MG tablet     venlafaxine (EFFEXOR XR) 150 MG 24 hr capsule     zinc gluconate 50 MG tablet     zonisamide (ZONEGRAN) 25 MG capsule     No current facility-administered medications for this visit.     Patient Active Problem List   Diagnosis     Focal glomerular sclerosis     RSD lower limb, bilateral     ADD (attention deficit disorder)     RSD upper limb, right     Osteopenia      Major depression     Hypertension     Insomnia     Hypercholesterolemia     Right rotator cuff tear     Cluster headaches     Lumbar radiculopathy     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     Temporomandibular joint-pain-dysfunction syndrome (TMJ)     Hypothyroidism     Chronic pain     Snoring     Generalized abdominal pain     Bilateral carotid artery stenosis     Coronary artery disease involving native coronary artery of native heart with angina pectoris (H)     Other chronic pulmonary embolism without acute cor pulmonale (H)     Hematuria     Hydronephrosis     Bladder spasms     Anxiety disorder due to medical condition     Family relationship problem     History of posttraumatic stress disorder (PTSD)     Generalized muscle weakness     Myofascial pain     Moderate major depression (H)     Elevated lipase     Abdominal bloating     Acquired absence of other specified parts of digestive tract     Ampullary stenosis     Obstruction of biliary tree     Anemia     Aphthous ulcer of ileum     Bipolar disorder, unspecified (H)     Disorder of phosphorus metabolism     Edema     Gastroesophageal reflux disease without esophagitis     Obstruction of common bile duct     Overflow diarrhea     History of partial colectomy     Reflex sympathetic dystrophy     Solitary left kidney     Flank pain     Hypocitraturia     Primary osteoarthritis of both knees     Iron deficiency anemia     Proteinuria     Concussion with loss of consciousness     Schizoaffective disorder (H)     Muscle weakness (generalized)     Shuffling gait     Falls frequently     Long term current use of anticoagulant therapy     COPD (chronic obstructive pulmonary disease) (H)     CKD (chronic kidney disease) stage 4, GFR 15-29 ml/min (H)     Other cervical disc degeneration, unspecified cervical region     Derangement of meniscus     Intractable chronic migraine without aura     Occipital neuralgia     Post-traumatic headache     S/p  nephrectomy     Sciatic neuropathy     Pain in right knee     Leg pain, bilateral     Cervical radiculopathy     Spinal stenosis of cervical region     Fibrositis of neck       Objective:     /71   Pulse 84   Temp 98.8  F (37.1  C)   Resp 18   Wt 71.2 kg (157 lb)   LMP  (LMP Unknown)   SpO2 96%   Breastfeeding No   BMI 27.81 kg/m      Physical    General Appearance: Alert, pleasant, no distress, AVSS  Head: Normocephalic, without obvious abnormality, atraumatic  Eyes: Conjunctivae are normal.   Ears: Normal TMs and external ear canals, both ears  Nose: No significant congestion.  Throat: Throat is normal.  No exudate.  No vesicular lesions. Lips pink and moist  Lungs: Clear to auscultation bilaterally, though few rhonchi cleared with cough, respirations unlabored. Frequent cough  Heart: Regular rate and rhythm  Abdomen: Soft, non-distended, normal bowel sounds, nontender, no masses, no organomegaly  Back: no CVA tenderness with percussion  Extremities: No lower extremity edema  Skin: Skin color, texture, turgor slightly diminished no rashes or lesions  Psychiatric: Patient has a normal mood and affect.     Results for orders placed or performed in visit on 07/28/22   XR Chest 2 Views     Status: None    Narrative    EXAM: XR CHEST 2 VIEWS  LOCATION: [facility]  DATE/TIME: 7/28/2022 4:07 PM    INDICATION: prolonged cough and shortness of breath  COMPARISON: None.      Impression    IMPRESSION: Negative chest.   Results for orders placed or performed in visit on 07/28/22   Basic metabolic panel  (Ca, Cl, CO2, Creat, Gluc, K, Na, BUN)     Status: Abnormal   Result Value Ref Range    Sodium 137 136 - 145 mmol/L    Potassium 3.6 3.5 - 5.0 mmol/L    Chloride 104 98 - 107 mmol/L    Carbon Dioxide (CO2) 22 22 - 31 mmol/L    Anion Gap 11 5 - 18 mmol/L    Urea Nitrogen 23 8 - 28 mg/dL    Creatinine 1.83 (H) 0.60 - 1.10 mg/dL    Calcium 9.3 8.5 - 10.5 mg/dL    Glucose 92 70 - 125 mg/dL     GFR Estimate 28 (L) >60 mL/min/1.73m2   CBC with platelets and differential     Status: Abnormal   Result Value Ref Range    WBC Count 6.1 4.0 - 11.0 10e3/uL    RBC Count 4.15 3.80 - 5.20 10e6/uL    Hemoglobin 12.9 11.7 - 15.7 g/dL    Hematocrit 39.3 35.0 - 47.0 %    MCV 95 78 - 100 fL    MCH 31.1 26.5 - 33.0 pg    MCHC 32.8 31.5 - 36.5 g/dL    RDW 15.6 (H) 10.0 - 15.0 %    Platelet Count 166 150 - 450 10e3/uL    % Neutrophils 63 %    % Lymphocytes 24 %    % Monocytes 13 %    % Eosinophils 0 %    % Basophils 1 %    % Immature Granulocytes 0 %    Absolute Neutrophils 3.8 1.6 - 8.3 10e3/uL    Absolute Lymphocytes 1.4 0.8 - 5.3 10e3/uL    Absolute Monocytes 0.8 0.0 - 1.3 10e3/uL    Absolute Eosinophils 0.0 0.0 - 0.7 10e3/uL    Absolute Basophils 0.0 0.0 - 0.2 10e3/uL    Absolute Immature Granulocytes 0.0 <=0.4 10e3/uL   CBC with platelets and differential     Status: Abnormal    Narrative    The following orders were created for panel order CBC with platelets and differential.  Procedure                               Abnormality         Status                     ---------                               -----------         ------                     CBC with platelets and d...[[id number]]  Abnormal            Final result                 Please view results for these tests on the individual orders.       This note has been dictated in part using voice recognition software.  Any grammatical or context distortions are unintentional and inherent to the software.  Please feel free to contact me directly for clarification if needed.

## 2022-07-29 NOTE — PROGRESS NOTES
Per Dr. Amada perez to decrease Praluent to 75 mg. Called patient. Will follow up in 2 weeks. She requests a refill of allopurinol as well.

## 2022-07-29 NOTE — Clinical Note
Chase! I met with Sandy and she continues to have diarrhea which she feels started after inrceasing the Praluent to 150 mg. We reviewed that the risk is low but per up to date, there is a 5% risk of diarrhea. What are your thoughts on trying the 75 mg for her next injection in a few weeks and monitoring for improvement in her diarrhea? Let me know, thanks!  Darlin Elise, Pharm.D., [facility]  Medication Therapy Management Pharmacist  [facility]

## 2022-07-31 ENCOUNTER — LAB (OUTPATIENT)
Dept: LAB | Facility: CLINIC | Age: 82
End: 2022-07-31
Payer: MEDICARE

## 2022-07-31 DIAGNOSIS — R19.7 DIARRHEA, UNSPECIFIED TYPE: ICD-10-CM

## 2022-07-31 DIAGNOSIS — N18.4 CKD (CHRONIC KIDNEY DISEASE) STAGE 4, GFR 15-29 ML/MIN (H): Primary | ICD-10-CM

## 2022-07-31 DIAGNOSIS — G89.4 CHRONIC PAIN SYNDROME: ICD-10-CM

## 2022-07-31 LAB — C DIFF TOX B STL QL: NEGATIVE

## 2022-07-31 PROCEDURE — 87506 IADNA-DNA/RNA PROBE TQ 6-11: CPT

## 2022-07-31 PROCEDURE — 87493 C DIFF AMPLIFIED PROBE: CPT | Mod: 59

## 2022-07-31 PROCEDURE — 87338 HPYLORI STOOL AG IA: CPT

## 2022-08-01 ENCOUNTER — HOSPITAL ENCOUNTER (OUTPATIENT)
Dept: CT IMAGING | Facility: CLINIC | Age: 82
Discharge: HOME | End: 2022-08-01
Attending: OTOLARYNGOLOGY
Payer: MEDICARE

## 2022-08-01 ENCOUNTER — APPOINTMENT (OUTPATIENT)
Dept: LAB | Facility: CLINIC | Age: 82
End: 2022-08-01
Attending: OTOLARYNGOLOGY
Payer: MEDICARE

## 2022-08-01 DIAGNOSIS — J38.01 UNILATERAL VOCAL CORD PARALYSIS: ICD-10-CM

## 2022-08-01 DIAGNOSIS — R19.7 DIARRHEA, UNSPECIFIED TYPE: ICD-10-CM

## 2022-08-01 PROCEDURE — G1010 CDSM STANSON: HCPCS

## 2022-08-01 NOTE — TELEPHONE ENCOUNTER
"Routing refill request to provider for review/approval because:  Medication is reported/historical  Drug interaction warning    Last Written Prescription Date:    Last Fill Quantity: ,  # refills:    Last office visit provider:  6/8/22     Requested Prescriptions   Pending Prescriptions Disp Refills     torsemide (DEMADEX) 5 MG tablet [Pharmacy Med Name: TORSEMIDE 5MG TABS] 90 tablet 1     Sig: TAKE TWO TABLETS BY MOUTH EVERY MORNING AND ONE TABLET WITH LUNCH. HOLD FOR SYSTOLIC BLOOD PRESSURE UNDER 100.       Diuretics (Including Combos) Protocol Failed - 7/31/2022  3:52 PM        Failed - Normal serum creatinine on file in past 12 months     Recent Labs   Lab Test 07/28/22  1608   CR 1.83*              Passed - Blood pressure under 140/90 in past 12 months     BP Readings from Last 3 Encounters:   07/28/22 107/71   07/19/22 118/61   07/18/22 101/64                 Passed - Recent (12 mo) or future (30 days) visit within the authorizing provider's specialty     Patient has had an office visit with the authorizing provider or a provider within the authorizing providers department within the previous 12 mos or has a future within next 30 days. See \"Patient Info\" tab in inbasket, or \"Choose Columns\" in Meds & Orders section of the refill encounter.              Passed - Medication is active on med list        Passed - Patient is age 18 or older        Passed - No active pregancy on record        Passed - Normal serum potassium on file in past 12 months     Recent Labs   Lab Test 07/28/22  1608   POTASSIUM 3.6                    Passed - Normal serum sodium on file in past 12 months     Recent Labs   Lab Test 07/28/22  1608                 Passed - No positive pregnancy test in past 12 months           traZODone (DESYREL) 100 MG tablet [Pharmacy Med Name: TRAZODONE HCL 100MG TABS] 360 tablet 1     Sig: TAKE 3 TO 4 TABLETS BY MOUTH AT BEDTIME       Serotonin Modulators Passed - 7/31/2022  3:52 PM        Passed - " "Recent (12 mo) or future (30 days) visit within the authorizing provider's specialty     Patient has had an office visit with the authorizing provider or a provider within the authorizing providers department within the previous 12 mos or has a future within next 30 days. See \"Patient Info\" tab in inbasket, or \"Choose Columns\" in Meds & Orders section of the refill encounter.              Passed - Medication is active on med list        Passed - Patient is age 18 or older        Passed - No active pregnancy on record        Passed - No positive pregnancy test in past 12 months             Perla Reyna, RN 08/01/22 1:51 PM  "

## 2022-08-02 ENCOUNTER — HOSPITAL ENCOUNTER (OUTPATIENT)
Dept: OCCUPATIONAL THERAPY | Facility: REHABILITATION | Age: 82
Discharge: HOME | End: 2022-08-02
Payer: MEDICARE

## 2022-08-02 ENCOUNTER — HOSPITAL ENCOUNTER (OUTPATIENT)
Dept: SPEECH THERAPY | Facility: REHABILITATION | Age: 82
Discharge: HOME | End: 2022-08-02
Payer: MEDICARE

## 2022-08-02 DIAGNOSIS — G93.31 POSTVIRAL FATIGUE SYNDROME: Primary | ICD-10-CM

## 2022-08-02 DIAGNOSIS — M79.644 PAIN IN FINGER OF RIGHT HAND: ICD-10-CM

## 2022-08-02 DIAGNOSIS — R29.898 RIGHT HAND WEAKNESS: ICD-10-CM

## 2022-08-02 DIAGNOSIS — R41.841 COGNITIVE COMMUNICATION DEFICIT: Primary | ICD-10-CM

## 2022-08-02 DIAGNOSIS — Z78.9 DECREASED ACTIVITIES OF DAILY LIVING (ADL): ICD-10-CM

## 2022-08-02 LAB — H PYLORI AG STL QL IA: NEGATIVE

## 2022-08-02 PROCEDURE — 92507 TX SP LANG VOICE COMM INDIV: CPT | Mod: GN | Performed by: SPEECH-LANGUAGE PATHOLOGIST

## 2022-08-02 PROCEDURE — 97535 SELF CARE MNGMENT TRAINING: CPT | Mod: GO | Performed by: OCCUPATIONAL THERAPIST

## 2022-08-02 RX ORDER — TORSEMIDE 5 MG/1
TABLET ORAL
Qty: 90 TABLET | Refills: 1 | Status: ON HOLD | OUTPATIENT
Start: 2022-08-02 | End: 2022-08-24

## 2022-08-02 RX ORDER — TRAZODONE HYDROCHLORIDE 100 MG/1
TABLET ORAL
Qty: 360 TABLET | Refills: 1 | Status: SHIPPED | OUTPATIENT
Start: 2022-08-02 | End: 2022-11-29

## 2022-08-02 NOTE — PROGRESS NOTES
Outpatient Speech Language Therapy Evaluation  PLAN OF TREATMENT FOR OUTPATIENT REHABILITATION  (COMPLETE FOR INITIAL CLAIMS ONLY)  Patient's Last Name, First Name, M.I.  YOB: 1940  Sandy Spencer                        Provider's Name  Chantelle Prasad, SLP Medical Record No.  [id number]                               Onset Date:  7/27/22   Start of Care Date: 7/27/22     Type: Speech Language Therapy Medical Diagnosis: Dysphonia [R49.0]                        Therapy Diagnosis:  Dysphonia [R49.0]   Visits from SOC:  1   _________________________________________________________________________________  Plan of Treatment:   Speech therapy    DURATION/FREQUENCY OF TREATMENT  Six weekly, one-hour sessions, with two monthly one-hour follow-up sessions    PROGNOSIS  Good prognosis for voice improvement with speech therapy and regular practice of therapeutic activities.    BARRIERS TO LEARNING/TEACHING AND LEARNING NEEDS  None/Unremarkable    Goals:  Patient goal:   1. To improve and maintain a healthy voice quality    Short-term goal(s): Within the first 4 sessions, Ms. Spencer will:  Decrease extrinsic laryngeal muscle activity during communication events   Improve voice quality in all activities of daily living using, as measured by a minimum of a 50% point reduction on the Voice Handicap Index-10 or Singing VHI  Demonstrate appropriate vocal hygiene including, but not limited to, adequate hydration, avoiding vocal abuse, integrating behavioral and dietary changes for GERD into their daily life?.   Incorporate behaviors to reduce irritation and promote laryngeal healing and prevent recurrence.?   Implement behavioral strategies to reduce laryngopharyngeal reflux. ?   Independently maintain a forward focus voice placement 90% of the time during conversational speech.  Independently perform the Vocal Function Exercises,  rebalancing respiration, phonation, and resonance 90% of the time      Long-term goal(s): In 6 months, Ms. Spencer will:  Coordinate phonatory and respiratory subsystems, demonstrating adequate independence for activities of daily communication   _________________________________________________________________________________    I CERTIFY THE NEED FOR THESE SERVICES FURNISHED UNDER        THIS PLAN OF TREATMENT AND WHILE UNDER MY CARE     (Physician attestation of this document indicates review and certification of the therapy plan).     Certification date from: 7/27/22  Certification date to: 10/25/22    Referring Provider: Rigoberto Castillo MD

## 2022-08-03 ENCOUNTER — TELEPHONE (OUTPATIENT)
Dept: OTOLARYNGOLOGY | Facility: CLINIC | Age: 82
End: 2022-08-03

## 2022-08-03 ENCOUNTER — VIRTUAL VISIT (OUTPATIENT)
Dept: OTOLARYNGOLOGY | Facility: CLINIC | Age: 82
End: 2022-08-03
Payer: MEDICARE

## 2022-08-03 DIAGNOSIS — J38.01 UNILATERAL VOCAL CORD PARALYSIS: ICD-10-CM

## 2022-08-03 DIAGNOSIS — R49.0 DYSPHONIA: Primary | ICD-10-CM

## 2022-08-03 PROCEDURE — 92507 TX SP LANG VOICE COMM INDIV: CPT | Mod: GN | Performed by: SPEECH-LANGUAGE PATHOLOGIST

## 2022-08-03 NOTE — PROGRESS NOTES
[facility]  VOICE / UPPER AIRWAY TREATMENT NOTE (CPT 96329)      Patient's name: Sandy Spencer  Date of Session: 8/3/2022  Providing SLP: Chantelle Prasad MS CCC-SLP  Referring Provider: Rigoberto Castillo MD [facility]  Session #: 1  Session Location: Sandy was seen via telehealth today.     The patient has been notified and verbally consented to the following statements:     This video visit will be conducted between you and your provider.    Patient has opted to conduct today's video visit vs an in-person appointment, and is not able to attend due to possible exposure to COVID-19.      If during the course of the call the provider feels a video visit is not appropriate, you will not be charged for this service.     Provider has received verbal consent for billable virtual visit from the patient? Yes  Preferred method for receiving information: [employer]  Call initiated at: 3:07 PM  Call ended at: 3:58 PM  Platform used to conduct today's virtual appointment: AM Well Video  Location of provider: Residence  Location of patient: Residence       Impressions from most recent evaluation on 7/27/22 by Chantelle Prasad MS CCC-SLP: Sandy presents today with dysphonia (R49.0) secondary to L vocal fold paralysis (J38.01). Laryngoscopy with Dr. Castillo on 7/14/22 demonstrated a L true vocal fold immobile from a medial position. Perceptually, Sandy's voice is mildly rough, weak, and strained. Voice therapy has been recommended to optimize her coordination between respiration, phonation, and resonance. Repeat laryngoscopy may be required if she does not progress with voice therapy techniques alone. She is in agreement with this plan of care.         SUBJECTIVE:  Sandy was seen by Augusta Ramirez, CCC-SLP, for cognitive therapy. Evaluation (Cognitive Linguistic Quick Test - CLQT) demonstrated attention, executive functioning, visual-spatial skills, composite severity rating, and clock drawing all within normal  "limits with mild impaired memory.  She also attended a pulmonology appointment today and was prescribed albuterol and Arnuity inhalers for her asthma.  She did use the albuterol immediately prior to our session; however, she will not get the steroid inhaler until tomorrow.  She also had a CT scan head/neck secondary to her unilateral vocal fold paralysis, and there were no obvious concerns for the recurrent laryngeal nerve.  She also expressed interest in having her hearing retested through the [employer] system.         OBJECTIVE/ASSESSMENT:    PATIENT REPORTED MEASURES:  Dysponia SLP Goals 8/3/2022   How would you rate your speaking voice quality, if 0 is worst voice quality, and 10 is best voice? 3   How much effort is it to speak, if 0 is no extra effort and 10 is maximum effort? 5   How how severe is your [Throat Discomfort - or use patient specific description], if 0 is no [discomfort] at all and 10 is the worst [discomfort]? 5   How would you rate your breathing, if 0 is the worst and 10 is the best? 5   How much does your voice problem bother you? Quite a bit   How much does your breathing problem bother you?         Quite a bit   How much does your throat discomfort bother you?     Quite a bit       Patient Supplied Answers To Last 2 VHI Questionnaires  Voice Handicap Index (VHI-10) 7/27/2022   My voice makes it difficult for people to hear me 2   People have difficulty understanding me in a noisy room 2   My voice difficulties restrict my personal and social life.  2   I feel left out of conversations because of my voice 2   My voice problem causes me to lose income 0   I feel as though I have to strain to produce voice 3   The clarity of my voice is unpredictable 2   My voice problem upsets me 2   My voice makes me feel handicapped 2   People ask, \"What's wrong with your voice?\" 3   VHI-10 20         Patient Supplied Answers To Last 2 CSI Questionnaires  Cough Severity Index (CSI) 7/27/2022   My cough is " worse when I lie down 1   My coughing problem causes me to restrict my personal and social life 2   I tend to avoid places because of my cough problem 1   I feel embarrassed because of my coughing problem 1   People ask, ''What's wrong?'' because I cough a lot 0   I run out of air when I cough 2   My coughing problem affects my voice 1   My coughing problem limits my physical activity 2   My coughing problem upsets me 2   People ask me if I am sick because I cough a lot 0   CSI Score 12         Patient Supplied Answers To Last 2 EAT Questionnaires  Eating Assessment Tool (EAT-10) 7/27/2022   My swallowing problem has caused me to lose weight 0   My swallowing problem interferes with my ability to go out for meals 0   Swallowing liquids takes extra effort 0   Swallowing solids takes extra effort 1   Swallowing pills takes extra effort 1   Swallowing is painful 2   The pleasure of eating is affected by my swallowing 0   When I swallow food sticks in my throat 2   I cough when I eat 1   Swallowing is stressful 2   EAT-10 9         Patient Supplied Answers to Dyspnea Index Questionnaire:  Dyspnea Index 7/27/2022   1. I have trouble getting air in. 2   2. I feel tightness in my throat when I am having eriberto breathing problem. 2   3. It takes more effort to breathe than it used to. 4   4. Change in weather affect my breathing problem. 0   5. My breathing gets worse with stress. 2   6. I make sound/noise breathing in 0   7. I have to strain to breathe. 2   8. My shortness of breath gets worse with exercise or physical activity 4   9. My breathing problem makes me feel stressed. 2   10. My breathing problem casuses me to restrict my personal and social life. 2   Dyspnea Index Total Score 20       THERAPEUTIC ACTIVITIES:    Semi-Occluded Vocal Tract Exercises (SOVTs) are a series of exercises aimed to recoordinate respiration, phonation, and resonance to produce an effortless, clear voice. Such exercises include straw  phonation with or without water resistance, lip trills, humming/Basic Training Gesture for Resonant Voice Therapy, and transoral lip buzz/Basic Training Gesture for Vocal Function Exercises at comfortable speaking pitches and glissando tasks. These exercises were instructed today, and Sandy performed them with 70% accuracy with maximal clinician cueing and modeling. Adjustments were made to her overall effort, and this yielded a clearer and less strained voice quality during sustained phonation tasks.  She was most successful with straw phonation with and without water tasks        IMPRESSIONS: Sandy presents today with dysphonia (R49.0) secondary to L vocal fold paralysis (J38.01).  She was very stimulable today with SOVT exercises and will continue to perform these as her home programming.         PLAN:    Sandy will perform SOVT exercises 5 times daily for 2 to 3 minutes between sessions.    Written instructions were provided to aid home programming via [email or web address]    Sandy continues to demonstrate adequate progress toward long- and short-term goals.    Continued voice therapy services are recommended. Sandy will follow-up for additional sessions in 1 week. Current goals will continue to be addressed.    I will contact her referring provider to request an audiogram order to be placed    Sandy is in agreement with this plan of care.        BILLING SUMMARY:    Total treatment time: 51 minutes    Speech Pathology Treatment ([id number])    No charge facility fee ([id number])        Chantelle Prasad MS CCC-SLP  Speech-Language Pathologist  [facility]  [email or web address]  [phone]

## 2022-08-03 NOTE — PATIENT INSTRUCTIONS
Semi-Occluded Vocal Tract Exercises         Sustain for 5-7 seconds each, total of 2 -3 minutes,  5   mini  practice sessions per day    (You can do these as often as 1 minute per hour)         Straw Phonation with Water Resistance / Cup Bubbles  (1 inch of water)  Airflow  Goal: consistent, small bubbles  Voice   Air stays on, add voice using  No   with round lips and forward placement  Goal: consistent, small bubbles  Also tried this without water  Airflow stays consistent  Feel tingling in the fingers and lips  Sounds like a kazoo

## 2022-08-03 NOTE — TELEPHONE ENCOUNTER
Chantelle Prasad would like to schedule Sandy for some virtual return SLP voice visits on these days     August 9 at 10 AM   August 16 at 11 AM     Chantelle's schedule currently says that these are multidisciplinary slots, but gives the ok to book those times out.

## 2022-08-03 NOTE — LETTER
8/3/2022       RE: Sandy Spencer  [address]  [place] [address]     Dear Colleague,    Thank you for referring your patient, Sandy Spencer, to the [facility] [place] [facility]. Please see a copy of my visit note below.    [facility]  VOICE / UPPER AIRWAY TREATMENT NOTE (CPT 62320)      Patient's name: Sandy Spencer  Date of Session: 8/3/2022  Providing SLP: Chantelle Prasad MS CCC-SLP  Referring Provider: Rigoberto Castillo MD - [facility]  Session #: 1  Session Location: Sandy was seen via telehealth today.     The patient has been notified and verbally consented to the following statements:     This video visit will be conducted between you and your provider.    Patient has opted to conduct today's video visit vs an in-person appointment, and is not able to attend due to possible exposure to COVID-19.      If during the course of the call the provider feels a video visit is not appropriate, you will not be charged for this service.     Provider has received verbal consent for billable virtual visit from the patient? Yes  Preferred method for receiving information: [employer]  Call initiated at: 3:07 PM  Call ended at: 3:58 PM  Platform used to conduct today's virtual appointment: AM Well Video  Location of provider: Residence  Location of patient: Residence       Impressions from most recent evaluation on 7/27/22 by Chantelle Prasad MS CCC-SLP: Sandy presents today with dysphonia (R49.0) secondary to L vocal fold paralysis (J38.01). Laryngoscopy with Dr. Castillo on 7/14/22 demonstrated a L true vocal fold immobile from a medial position. Perceptually, Sandy's voice is mildly rough, weak, and strained. Voice therapy has been recommended to optimize her coordination between respiration, phonation, and resonance. Repeat laryngoscopy may be required if she does not progress with voice therapy techniques alone.  She is in agreement with this plan of care.         SUBJECTIVE:  Sandy was seen by Augusta Ramirez, CCC-SLP, for cognitive therapy. Evaluation (Cognitive Linguistic Quick Test - CLQT) demonstrated attention, executive functioning, visual-spatial skills, composite severity rating, and clock drawing all within normal limits with mild impaired memory.  She also attended a pulmonology appointment today and was prescribed albuterol and Arnuity inhalers for her asthma.  She did use the albuterol immediately prior to our session; however, she will not get the steroid inhaler until tomorrow.  She also had a CT scan head/neck secondary to her unilateral vocal fold paralysis, and there were no obvious concerns for the recurrent laryngeal nerve.  She also expressed interest in having her hearing retested through the  [employer] system.         OBJECTIVE/ASSESSMENT:    PATIENT REPORTED MEASURES:  Dysponia SLP Goals 8/3/2022   How would you rate your speaking voice quality, if 0 is worst voice quality, and 10 is best voice? 3   How much effort is it to speak, if 0 is no extra effort and 10 is maximum effort? 5   How how severe is your [Throat Discomfort - or use patient specific description], if 0 is no [discomfort] at all and 10 is the worst [discomfort]? 5   How would you rate your breathing, if 0 is the worst and 10 is the best? 5   How much does your voice problem bother you? Quite a bit   How much does your breathing problem bother you?         Quite a bit   How much does your throat discomfort bother you?     Quite a bit       Patient Supplied Answers To Last 2 VHI Questionnaires  Voice Handicap Index (VHI-10) 7/27/2022   My voice makes it difficult for people to hear me 2   People have difficulty understanding me in a noisy room 2   My voice difficulties restrict my personal and social life.  2   I feel left out of conversations because of my voice 2   My voice problem causes me to lose income 0   I feel as though I have to  "strain to produce voice 3   The clarity of my voice is unpredictable 2   My voice problem upsets me 2   My voice makes me feel handicapped 2   People ask, \"What's wrong with your voice?\" 3   VHI-10 20         Patient Supplied Answers To Last 2 CSI Questionnaires  Cough Severity Index (CSI) 7/27/2022   My cough is worse when I lie down 1   My coughing problem causes me to restrict my personal and social life 2   I tend to avoid places because of my cough problem 1   I feel embarrassed because of my coughing problem 1   People ask, ''What's wrong?'' because I cough a lot 0   I run out of air when I cough 2   My coughing problem affects my voice 1   My coughing problem limits my physical activity 2   My coughing problem upsets me 2   People ask me if I am sick because I cough a lot 0   CSI Score 12         Patient Supplied Answers To Last 2 EAT Questionnaires  Eating Assessment Tool (EAT-10) 7/27/2022   My swallowing problem has caused me to lose weight 0   My swallowing problem interferes with my ability to go out for meals 0   Swallowing liquids takes extra effort 0   Swallowing solids takes extra effort 1   Swallowing pills takes extra effort 1   Swallowing is painful 2   The pleasure of eating is affected by my swallowing 0   When I swallow food sticks in my throat 2   I cough when I eat 1   Swallowing is stressful 2   EAT-10 9         Patient Supplied Answers to Dyspnea Index Questionnaire:  Dyspnea Index 7/27/2022   1. I have trouble getting air in. 2   2. I feel tightness in my throat when I am having eriberto breathing problem. 2   3. It takes more effort to breathe than it used to. 4   4. Change in weather affect my breathing problem. 0   5. My breathing gets worse with stress. 2   6. I make sound/noise breathing in 0   7. I have to strain to breathe. 2   8. My shortness of breath gets worse with exercise or physical activity 4   9. My breathing problem makes me feel stressed. 2   10. My breathing problem casuses " me to restrict my personal and social life. 2   Dyspnea Index Total Score 20       THERAPEUTIC ACTIVITIES:    Semi-Occluded Vocal Tract Exercises (SOVTs) are a series of exercises aimed to recoordinate respiration, phonation, and resonance to produce an effortless, clear voice. Such exercises include straw phonation with or without water resistance, lip trills, humming/Basic Training Gesture for Resonant Voice Therapy, and transoral lip buzz/Basic Training Gesture for Vocal Function Exercises at comfortable speaking pitches and glissando tasks. These exercises were instructed today, and Sandy performed them with 70% accuracy with maximal clinician cueing and modeling. Adjustments were made to her overall effort, and this yielded a clearer and less strained voice quality during sustained phonation tasks.  She was most successful with straw phonation with and without water tasks        IMPRESSIONS: Sandy presents today with dysphonia (R49.0) secondary to L vocal fold paralysis (J38.01).  She was very stimulable today with SOVT exercises and will continue to perform these as her home programming.         PLAN:    Sandy will perform SOVT exercises 5 times daily for 2 to 3 minutes between sessions.    Written instructions were provided to aid home programming via [employer]    Sandy continues to demonstrate adequate progress toward long- and short-term goals.    Continued voice therapy services are recommended. Sandy will follow-up for additional sessions in 1 week. Current goals will continue to be addressed.    I will contact her referring provider to request an audiogram order to be placed    Sandy is in agreement with this plan of care.        BILLING SUMMARY:    Total treatment time: 51 minutes    Speech Pathology Treatment (59820)    No charge facility fee ([id number])        Chantelle Prasad MS CCC-SLP  Speech-Language Pathologist  [facility] Voice [facility]  [email or web address]  [phone]

## 2022-08-04 DIAGNOSIS — R06.09 POST-COVID CHRONIC DYSPNEA: Primary | ICD-10-CM

## 2022-08-04 DIAGNOSIS — H91.90 HEARING LOSS, UNSPECIFIED HEARING LOSS TYPE, UNSPECIFIED LATERALITY: ICD-10-CM

## 2022-08-04 DIAGNOSIS — U09.9 POST-COVID CHRONIC DYSPNEA: Primary | ICD-10-CM

## 2022-08-04 DIAGNOSIS — R49.0 VOICE HOARSENESS: ICD-10-CM

## 2022-08-05 ENCOUNTER — TELEPHONE (OUTPATIENT)
Dept: OTOLARYNGOLOGY | Facility: CLINIC | Age: 82
End: 2022-08-05

## 2022-08-08 NOTE — TELEPHONE ENCOUNTER
Pt states she is sick and can not do tomorrow appts ,  I dont see that she was on the schedule. Please reach out to pt to discuss , confirm appt time . Thank you

## 2022-08-11 NOTE — PROGRESS NOTES
Medication Therapy Management (MTM) Encounter    ASSESSMENT:                            Diarrhea: No improvement in diarrhea. I encouraged her to call [facility] to discuss further as it does not appear that lowering Praluent or discontinuing magnesium has helped.    SOB: No significant improvement in her breathing so far, but I encouraged her to continue the Arnuity at least for the full month and monitor for improvement in shortness of breath and less use of albuterol.  If at the end of the month she has not noticed any improvement in her breathing, I will reach out to pulmonology about switching her to a ICS/LABA.  However, if she has been noticing an improvement on Arnuity, then continue ICS monotherapy but we can switch her to an alternative generic form.    Insomnia: Some improvement in her insomnia, continue current regimen. She will chat with Dr. Lynn about the zonisamide dose.     Hyperlipidemia/PAD: Patient to continue Praluent 75 mg for now but if [facility] finds another cause for the diarrhea, will consider increasing the dose again.    Hypertension: BP has been slightly above goal <130/80 mmHg due to CKD.  Continue current regimen and monitoring.     Depression: Continue current dose of venlafaxine, will continue to monitor      PLAN:                            1.  Please set up a follow-up with [facility]   2.  Continue Arnuity Ellipta daily    Follow-up: phone follow-up 8/31/2022    SUBJECTIVE/OBJECTIVE:                          Sandy Spencer is a 81 year old female called for a follow up visit.     Reason for visit: Follow up since we last spoke 7/29/22  Medication Adherence/Access:  She used to have home care through [employer] but was discharged.  More recently her friend (an RN) has been setting up her medicines for her. Certain oral medications goes right through her -- has 9 inches of her colon. Would like that considered for her medications. She is using goodrx coupon at [employer] for some of her medicines.   Prefers capsules.  She gets Praluent and Elquis through prescription assistance programs.      Diarrhea: Patient continues to have diarrhea since we last spoke.  She has been having accidents when walking.  Lomotil and Imodium are not helpful.  At last MTM appointment we discontinued magnesium in decrease Praluent, and she has not noticed any difference except there is less pain in her abdomen.  She has followed with [facility] in the past.    SOB: Met with pulm on 8/3/22. Started on Arnuity inhaler daily and  albuterol HFA PRN.  She is paying about $40 for the Arnuity.  She has not noticed any improvement since starting the Arnuity.  Confirms that she rinses her mouth after.  Reports having symptoms of shortness of breath, fatigue, lightheadedness.  She uses albuterol about 2-3 times a day.  Believes she has been on other inhalers in the past, possibly Symbicort?    Insomnia: Continues trazodone 300-400 mg nightly and zonisamide 25 mg x 2 (50 mg) nightly.  Zonisamide was started on 6/29/2022 by Dr. Lynn.  She wonders about a higher dose.  Reports that she lately has been sleeping fairly well this week, she wonders if it has to do with the inhalers. She was given delta 8 from her granddaughters boyfriend who works in a cannabis shop, which she feels has also been helpful for her sleep.   She is no longer taking hydroxyzine, clonidine, doxepin or nortriptyline.  History of sleepwalking with Ambien  Mirtazapine was not helpful    Hyperlipidemia/PAD: Currently taking Praluent 75 mg every 14 days and rosuvastatin 10 mg daily.    Due to her last lipids checked on 4/8/2022, Dr. Ybarra increased her Praluent (see above regarding diarrhea) but he agreed to decrease back at last MTM appt due to possible diarrhea  Was on rosuvastatin 40 mg daily and stopped due to elevated CK, but 10 mg started on 7/13/22.   Since restarting rosuvastatin, she has not noticed any worsening in myalgias.  She was approved for the prescription  assistance program for Praluent.  MRI stress test done 7/19/22  Last CK level = 135 on 3/4/22  Last lipids checked 7/8/22.  Confirms that she has been compliant with the Praluent.    Hypertension: Continues carvedilol 6.25 mg TWICE DAILY (hold if BP <100/60).   Did not discuss in detail today.  Lisinopril stopped on 2/5/22 due to hypotension.   She does check her blood pressure at home, but we did not have time to discuss in detail today.  ECHO on 3/31/22 shows EF 60%  BP Readings from Last 3 Encounters:   08/03/22 132/76   07/28/22 107/71   07/19/22 118/61     Depression: Currently taking venlafaxine  mg daily.  We increased venlafaxine from 75 mg at last MTM appointment on 7/1/2022.  She feels like her mood has been stable, but the diarrhea has worsened her mood slightly.      Today's Vitals: LMP  (LMP Unknown)   ----------------    Allergies/ADRs: Reviewed in chart  Past Medical History: Reviewed in chart  Tobacco: She reports that she quit smoking about 21 years ago. She has a 20.00 pack-year smoking history. She has never used smokeless tobacco.  Alcohol: Reviewed in chart    I spent 25 minutes with this patient today. All changes were made via collaborative practice agreement with Caitlyn Rees MD. A copy of the visit note was provided to the patient's primary care provider.    The patient declined a summary of these recommendations.     Darlin Elise, Pharm.D., BCACP   Medication Therapy Management Pharmacist   [facility]     Telemedicine Visit Details  Type of service:  Telephone visit  Start Time: 11:51 AM  End Time: 12:16 AM  Originating Location (patient location): Home  Distant Location (provider location):  [facility]

## 2022-08-12 ENCOUNTER — VIRTUAL VISIT (OUTPATIENT)
Dept: PHARMACY | Facility: CLINIC | Age: 82
End: 2022-08-12
Payer: COMMERCIAL

## 2022-08-12 DIAGNOSIS — F06.4 ANXIETY DISORDER DUE TO MEDICAL CONDITION: ICD-10-CM

## 2022-08-12 DIAGNOSIS — J44.9 CHRONIC OBSTRUCTIVE PULMONARY DISEASE, UNSPECIFIED COPD TYPE (H): ICD-10-CM

## 2022-08-12 DIAGNOSIS — I15.1 HYPERTENSION SECONDARY TO OTHER RENAL DISORDERS: ICD-10-CM

## 2022-08-12 DIAGNOSIS — E78.5 HYPERLIPIDEMIA LDL GOAL <70: ICD-10-CM

## 2022-08-12 DIAGNOSIS — G47.00 INSOMNIA, UNSPECIFIED TYPE: ICD-10-CM

## 2022-08-12 DIAGNOSIS — R19.7 OVERFLOW DIARRHEA: Primary | ICD-10-CM

## 2022-08-12 PROCEDURE — 99606 MTMS BY PHARM EST 15 MIN: CPT | Performed by: PHARMACIST

## 2022-08-12 PROCEDURE — 99607 MTMS BY PHARM ADDL 15 MIN: CPT | Performed by: PHARMACIST

## 2022-08-15 ENCOUNTER — TRANSFERRED RECORDS (OUTPATIENT)
Dept: HEALTH INFORMATION MANAGEMENT | Facility: CLINIC | Age: 82
End: 2022-08-15

## 2022-08-15 ENCOUNTER — TELEPHONE (OUTPATIENT)
Dept: CARDIOLOGY | Facility: CLINIC | Age: 82
End: 2022-08-15

## 2022-08-15 NOTE — TELEPHONE ENCOUNTER
Spoke with patient regarding vmm received. Pt having gi upset with praluent. Will discuss with provider and follow up. Pt expressed understanding. -JUAN

## 2022-08-16 DIAGNOSIS — R19.7 DIARRHEA, UNSPECIFIED TYPE: ICD-10-CM

## 2022-08-16 RX ORDER — DIPHENOXYLATE HCL/ATROPINE 2.5-.025MG
1 TABLET ORAL 4 TIMES DAILY PRN
Qty: 30 TABLET | Refills: 1 | Status: ON HOLD | OUTPATIENT
Start: 2022-08-16 | End: 2022-08-24

## 2022-08-16 NOTE — TELEPHONE ENCOUNTER
LVM stating recommendations, and asked for call back to [phone] to discuss.  -rah    ===View-only below this line===  ----- Message -----  From: Luz Elena Ybarra MD  Sent: 8/15/2022   4:59 PM CDT  To: Hanna Hastings    If she cannot tolerate to Praluent, may consider another brand like Repatha.    Thanks  Wilbert

## 2022-08-16 NOTE — TELEPHONE ENCOUNTER
Refill Request  Medication name: Pending Prescriptions:                       Disp   Refills    diphenoxylate-atropine (LOMOTIL) 2.5-0.02*30 tab*1            Sig: Take 1 tablet by mouth 4 times daily as needed           for diarrhea    Requested Pharmacy: [facility]     PT STATES TAKING 2 TABS 4X/DAY, NEED NEW RX

## 2022-08-17 ENCOUNTER — OFFICE VISIT (OUTPATIENT)
Dept: FAMILY MEDICINE | Facility: CLINIC | Age: 82
End: 2022-08-17
Payer: MEDICARE

## 2022-08-17 ENCOUNTER — HOSPITAL ENCOUNTER (INPATIENT)
Facility: CLINIC | Age: 82
LOS: 5 days | Discharge: HOME HEALTH | DRG: 391 | End: 2022-08-24
Attending: EMERGENCY MEDICINE | Admitting: EMERGENCY MEDICINE
Payer: MEDICARE

## 2022-08-17 ENCOUNTER — APPOINTMENT (OUTPATIENT)
Dept: CT IMAGING | Facility: CLINIC | Age: 82
DRG: 391 | End: 2022-08-17
Attending: EMERGENCY MEDICINE
Payer: MEDICARE

## 2022-08-17 ENCOUNTER — APPOINTMENT (OUTPATIENT)
Dept: RADIOLOGY | Facility: CLINIC | Age: 82
DRG: 391 | End: 2022-08-17
Attending: EMERGENCY MEDICINE
Payer: MEDICARE

## 2022-08-17 VITALS
WEIGHT: 149.06 LBS | BODY MASS INDEX: 26.41 KG/M2 | HEART RATE: 103 BPM | OXYGEN SATURATION: 97 % | RESPIRATION RATE: 14 BRPM | TEMPERATURE: 99.7 F | DIASTOLIC BLOOD PRESSURE: 67 MMHG | SYSTOLIC BLOOD PRESSURE: 96 MMHG

## 2022-08-17 DIAGNOSIS — E78.00 HIGH CHOLESTEROL: ICD-10-CM

## 2022-08-17 DIAGNOSIS — K52.9 CHRONIC DIARRHEA: ICD-10-CM

## 2022-08-17 DIAGNOSIS — R42 LIGHTHEADEDNESS: Primary | ICD-10-CM

## 2022-08-17 DIAGNOSIS — E86.1 HYPOTENSION DUE TO HYPOVOLEMIA: ICD-10-CM

## 2022-08-17 DIAGNOSIS — J18.9 PNEUMONIA DUE TO INFECTIOUS ORGANISM, UNSPECIFIED LATERALITY, UNSPECIFIED PART OF LUNG: Primary | ICD-10-CM

## 2022-08-17 DIAGNOSIS — E87.6 HYPOKALEMIA: ICD-10-CM

## 2022-08-17 DIAGNOSIS — G90.511 COMPLEX REGIONAL PAIN SYNDROME TYPE 1 OF RIGHT UPPER EXTREMITY: ICD-10-CM

## 2022-08-17 DIAGNOSIS — G90.523 COMPLEX REGIONAL PAIN SYNDROME TYPE 1 OF BOTH LOWER EXTREMITIES: ICD-10-CM

## 2022-08-17 DIAGNOSIS — I10 BENIGN ESSENTIAL HYPERTENSION: ICD-10-CM

## 2022-08-17 DIAGNOSIS — R19.7 DIARRHEA, UNSPECIFIED TYPE: ICD-10-CM

## 2022-08-17 LAB
ALBUMIN SERPL-MCNC: 4 G/DL (ref 3.5–5)
ALP SERPL-CCNC: 103 U/L (ref 45–120)
ALT SERPL W P-5'-P-CCNC: 154 U/L (ref 0–45)
ANION GAP SERPL CALCULATED.3IONS-SCNC: 12 MMOL/L (ref 5–18)
AST SERPL W P-5'-P-CCNC: 91 U/L (ref 0–40)
ATRIAL RATE - MUSE: 85 BPM
BASOPHILS # BLD AUTO: 0 10E3/UL (ref 0–0.2)
BASOPHILS NFR BLD AUTO: 1 %
BILIRUB SERPL-MCNC: 0.5 MG/DL (ref 0–1)
BUN SERPL-MCNC: 29 MG/DL (ref 8–28)
CALCIUM SERPL-MCNC: 10 MG/DL (ref 8.5–10.5)
CHLORIDE BLD-SCNC: 101 MMOL/L (ref 98–107)
CHOLEST SERPL-MCNC: 129 MG/DL
CO2 SERPL-SCNC: 21 MMOL/L (ref 22–31)
CREAT SERPL-MCNC: 1.93 MG/DL (ref 0.6–1.1)
DIASTOLIC BLOOD PRESSURE - MUSE: NORMAL MMHG
EOSINOPHIL # BLD AUTO: 0 10E3/UL (ref 0–0.7)
EOSINOPHIL NFR BLD AUTO: 0 %
ERYTHROCYTE [DISTWIDTH] IN BLOOD BY AUTOMATED COUNT: 15.3 % (ref 10–15)
GFR SERPL CREATININE-BSD FRML MDRD: 26 ML/MIN/1.73M2
GLUCOSE BLD-MCNC: 103 MG/DL (ref 70–125)
HCT VFR BLD AUTO: 36 % (ref 35–47)
HDLC SERPL-MCNC: 82 MG/DL
HGB BLD-MCNC: 12.5 G/DL (ref 11.7–15.7)
HOLD SPECIMEN: NORMAL
IMM GRANULOCYTES # BLD: 0 10E3/UL
IMM GRANULOCYTES NFR BLD: 0 %
INTERPRETATION ECG - MUSE: NORMAL
LACTATE SERPL-SCNC: 0.8 MMOL/L (ref 0.7–2)
LDLC SERPL CALC-MCNC: 28 MG/DL
LYMPHOCYTES # BLD AUTO: 1.5 10E3/UL (ref 0.8–5.3)
LYMPHOCYTES NFR BLD AUTO: 27 %
MAGNESIUM SERPL-MCNC: 1.8 MG/DL (ref 1.8–2.6)
MCH RBC QN AUTO: 31.5 PG (ref 26.5–33)
MCHC RBC AUTO-ENTMCNC: 34.7 G/DL (ref 31.5–36.5)
MCV RBC AUTO: 91 FL (ref 78–100)
MONOCYTES # BLD AUTO: 0.9 10E3/UL (ref 0–1.3)
MONOCYTES NFR BLD AUTO: 17 %
NEUTROPHILS # BLD AUTO: 3 10E3/UL (ref 1.6–8.3)
NEUTROPHILS NFR BLD AUTO: 55 %
NRBC # BLD AUTO: 0 10E3/UL
NRBC BLD AUTO-RTO: 0 /100
P AXIS - MUSE: 57 DEGREES
PLATELET # BLD AUTO: 266 10E3/UL (ref 150–450)
POTASSIUM BLD-SCNC: 3 MMOL/L (ref 3.5–5)
PR INTERVAL - MUSE: 138 MS
PROT SERPL-MCNC: 7.6 G/DL (ref 6–8)
QRS DURATION - MUSE: 90 MS
QT - MUSE: 374 MS
QTC - MUSE: 445 MS
R AXIS - MUSE: 34 DEGREES
RBC # BLD AUTO: 3.97 10E6/UL (ref 3.8–5.2)
SARS-COV-2 RNA RESP QL NAA+PROBE: NEGATIVE
SODIUM SERPL-SCNC: 134 MMOL/L (ref 136–145)
SYSTOLIC BLOOD PRESSURE - MUSE: NORMAL MMHG
T AXIS - MUSE: 79 DEGREES
TRIGL SERPL-MCNC: 96 MG/DL
VENTRICULAR RATE- MUSE: 85 BPM
WBC # BLD AUTO: 5.4 10E3/UL (ref 4–11)

## 2022-08-17 PROCEDURE — 99219 PR INITIAL OBSERVATION CARE,LEVEL II: CPT | Performed by: EMERGENCY MEDICINE

## 2022-08-17 PROCEDURE — 36415 COLL VENOUS BLD VENIPUNCTURE: CPT | Performed by: EMERGENCY MEDICINE

## 2022-08-17 PROCEDURE — 99285 EMERGENCY DEPT VISIT HI MDM: CPT | Mod: 25

## 2022-08-17 PROCEDURE — 71046 X-RAY EXAM CHEST 2 VIEWS: CPT

## 2022-08-17 PROCEDURE — 96366 THER/PROPH/DIAG IV INF ADDON: CPT

## 2022-08-17 PROCEDURE — 250N000011 HC RX IP 250 OP 636: Performed by: EMERGENCY MEDICINE

## 2022-08-17 PROCEDURE — 85025 COMPLETE CBC W/AUTO DIFF WBC: CPT | Performed by: STUDENT IN AN ORGANIZED HEALTH CARE EDUCATION/TRAINING PROGRAM

## 2022-08-17 PROCEDURE — U0003 INFECTIOUS AGENT DETECTION BY NUCLEIC ACID (DNA OR RNA); SEVERE ACUTE RESPIRATORY SYNDROME CORONAVIRUS 2 (SARS-COV-2) (CORONAVIRUS DISEASE [COVID-19]), AMPLIFIED PROBE TECHNIQUE, MAKING USE OF HIGH THROUGHPUT TECHNOLOGIES AS DESCRIBED BY CMS-2020-01-R: HCPCS | Performed by: EMERGENCY MEDICINE

## 2022-08-17 PROCEDURE — C9803 HOPD COVID-19 SPEC COLLECT: HCPCS

## 2022-08-17 PROCEDURE — G1010 CDSM STANSON: HCPCS

## 2022-08-17 PROCEDURE — 96365 THER/PROPH/DIAG IV INF INIT: CPT

## 2022-08-17 PROCEDURE — 250N000013 HC RX MED GY IP 250 OP 250 PS 637: Performed by: EMERGENCY MEDICINE

## 2022-08-17 PROCEDURE — 96361 HYDRATE IV INFUSION ADD-ON: CPT

## 2022-08-17 PROCEDURE — 80061 LIPID PANEL: CPT | Performed by: EMERGENCY MEDICINE

## 2022-08-17 PROCEDURE — 99207 PR INPT ADMISSION FROM CLINIC: CPT | Performed by: PHYSICIAN ASSISTANT

## 2022-08-17 PROCEDURE — 93005 ELECTROCARDIOGRAM TRACING: CPT | Performed by: EMERGENCY MEDICINE

## 2022-08-17 PROCEDURE — 36415 COLL VENOUS BLD VENIPUNCTURE: CPT | Performed by: STUDENT IN AN ORGANIZED HEALTH CARE EDUCATION/TRAINING PROGRAM

## 2022-08-17 PROCEDURE — 83605 ASSAY OF LACTIC ACID: CPT | Performed by: EMERGENCY MEDICINE

## 2022-08-17 PROCEDURE — 258N000003 HC RX IP 258 OP 636: Performed by: STUDENT IN AN ORGANIZED HEALTH CARE EDUCATION/TRAINING PROGRAM

## 2022-08-17 PROCEDURE — G0378 HOSPITAL OBSERVATION PER HR: HCPCS

## 2022-08-17 PROCEDURE — 96367 TX/PROPH/DG ADDL SEQ IV INF: CPT

## 2022-08-17 PROCEDURE — 80053 COMPREHEN METABOLIC PANEL: CPT | Performed by: STUDENT IN AN ORGANIZED HEALTH CARE EDUCATION/TRAINING PROGRAM

## 2022-08-17 PROCEDURE — 83735 ASSAY OF MAGNESIUM: CPT | Performed by: STUDENT IN AN ORGANIZED HEALTH CARE EDUCATION/TRAINING PROGRAM

## 2022-08-17 RX ORDER — MONTELUKAST SODIUM 4 MG/1
2 TABLET, CHEWABLE ORAL
Status: ON HOLD | COMMUNITY
End: 2022-08-24

## 2022-08-17 RX ORDER — POTASSIUM CHLORIDE 7.45 MG/ML
10 INJECTION INTRAVENOUS ONCE
Status: COMPLETED | OUTPATIENT
Start: 2022-08-17 | End: 2022-08-17

## 2022-08-17 RX ORDER — HYDROCODONE BITARTRATE AND ACETAMINOPHEN 5; 325 MG/1; MG/1
1 TABLET ORAL ONCE
Status: COMPLETED | OUTPATIENT
Start: 2022-08-17 | End: 2022-08-17

## 2022-08-17 RX ADMIN — HYDROCODONE BITARTRATE AND ACETAMINOPHEN 1 TABLET: 5; 325 TABLET ORAL at 21:45

## 2022-08-17 RX ADMIN — SODIUM CHLORIDE, POTASSIUM CHLORIDE, SODIUM LACTATE AND CALCIUM CHLORIDE 1000 ML: 600; 310; 30; 20 INJECTION, SOLUTION INTRAVENOUS at 19:23

## 2022-08-17 RX ADMIN — POTASSIUM CHLORIDE 10 MEQ: 7.46 INJECTION, SOLUTION INTRAVENOUS at 20:26

## 2022-08-17 ASSESSMENT — ENCOUNTER SYMPTOMS
DIAPHORESIS: 1
APPETITE CHANGE: 1
ABDOMINAL PAIN: 1
FATIGUE: 1
LIGHT-HEADEDNESS: 1
FEVER: 1
NAUSEA: 1
DIARRHEA: 1
CHILLS: 1

## 2022-08-17 ASSESSMENT — ACTIVITIES OF DAILY LIVING (ADL)
ADLS_ACUITY_SCORE: 35
ADLS_ACUITY_SCORE: 35

## 2022-08-17 NOTE — PROGRESS NOTES
"  Assessment & Plan:      Problem List Items Addressed This Visit    None     Visit Diagnoses     Lightheadedness    -  Primary    Chronic diarrhea            Medical Decision Making  Patient with significant multiple comorbidities presents with severe chronic diarrhea, lightheadedness, and fatigue.  Patient is hypotensive and tachycardic here at the clinic.  Recommend patient be seen immediately in the emergency room for at least fluid replacement, but also further work-up as needed.  Called [facility] ED provider discussed patient case and reason for transfer.  Patient will leave by private vehicle.     Subjective:      Sandy Spencer is a 81 year old female here for evaluation of diarrhea, lightheadedness, and fatigue.  Patient has had chronic diarrhea and incontinence continuing after several follow-ups and evaluations with unknown cause.  She is now noting significant lightheadedness and fatigue.     The following portions of the patient's history were reviewed and updated as appropriate: allergies, current medications, and problem list.     Review of Systems  Pertinent items are noted in HPI.    Allergies  Allergies   Allergen Reactions     Acamprosate Other (See Comments), Headache and Nausea and Vomiting     Augmentin GI Disturbance     Carbamazepine Other (See Comments)     Patient felt \"drunk\".      Cyclobenzaprine Shortness Of Breath, Other (See Comments) and Unknown     Mouth ulcers and sores per pt       Mirtazapine      Other reaction(s): slowed breathing     Prednisone      Pregabalin Shortness Of Breath, Other (See Comments), Anaphylaxis and Unknown     Throat closes per pt       Sulfa Drugs Shortness Of Breath, Anaphylaxis and Unknown     Sulfamethoxazole-Trimethoprim      Other reaction(s): Respiratory problems, e.g., wheezingDermatological problems, e.g., rash, hives     Zolpidem Other (See Comments)     Sleep walking       Acetaminophen Other (See Comments)     Rebound headaches       Amiloride " Swelling and Unknown     Amoxicillin Diarrhea, Nausea and Vomiting and Unknown     Aspirin Other (See Comments)     History of gastric ulcers and instructed to not take more than 81 mg/d, 10/5/17 takes 81 mg at home  Stomach        Bupropion Other (See Comments)     Dizziness, confusion, fell 3 times. Mental Confusion.      Chromium Other (See Comments)     Staples: Reaction to all metals.States serious reactions after surgery        Duloxetine Hcl Unknown     Nsaids Other (See Comments)     H/o Stomach ulcers       Other Drug Allergy (See Comments)      [place]: turned black into the groin, was told to never have staples again     Oxycodone Headache     Serotonin Other (See Comments)     Sulindac      Tolmetin Other (See Comments) and Unknown     History of ulcer       Verapamil Other (See Comments)     Bradycardia     Valproic Acid Rash       Family History   Problem Relation Age of Onset     Heart Disease Mother      Kidney Disease Mother      Aortic aneurysm Mother      Dementia Mother      Heart Disease Father      Cerebrovascular Disease Father      Kidney Disease Father      Dementia Sister      Dementia Maternal Grandmother      Dementia Sister        Social History     Tobacco Use     Smoking status: Former Smoker     Packs/day: 1.00     Years: 20.00     Pack years: 20.00     Quit date: 2000     Years since quittin.6     Smokeless tobacco: Never Used   Substance Use Topics     Alcohol use: No        Objective:      BP 96/67   Pulse 103   Temp 99.7  F (37.6  C) (Oral)   Resp 14   Wt 67.6 kg (149 lb 1 oz)   LMP  (LMP Unknown)   SpO2 97%   BMI 26.41 kg/m    General appearance - alert, well appearing, and in no distress and non-toxic    The use of Dragon/Dialective dictation services was used to construct the content of this note; any grammatical errors are non-intentional. Please contact the author directly if you are in need of any clarification.

## 2022-08-17 NOTE — ED NOTES
Expected Patient Referral to ED  6:49 PM    Referring Clinic/Provider:  [facility]-in care across the street    Reason for referral/Clinical facts:  Patient with 8 weeks of diarrhea.  Saw GI on Monday.  No acute recommendations.  Now coming from urgent care with concerns of dehydration.    Recommendations provided:  Send to ED for further evaluation    Caller was informed that this institution does possess the capabilities and/or resources to provide for patient and should be transferred to our facility.    Discussed that if direct admit is sought and any hurdles are encountered, this ED would be happy to see the patient and evaluate.    Informed caller that recommendations provided are recommendations based only on the facts provided and that they responsible to accept or reject the advice, or to seek a formal in person consultation as needed and that this ED will see/treat patient should they arrive.      Wong Fernandez DO  [facility] EMERGENCY ROOM  [address] [place] [id number]  [phone]       Wong Fernandez DO  08/17/22 9462

## 2022-08-17 NOTE — TELEPHONE ENCOUNTER
Spoke with patient regarding recommendations from Dr. Ybarra. Pt states had vitiligo which was caused by repatha.pt requesting crestor be increased instead. In prior encounters indicate pt does not tolerate statins.  Will forward to Dr. Ybarra for further review. No further questions-barbara

## 2022-08-18 ENCOUNTER — APPOINTMENT (OUTPATIENT)
Dept: SPEECH THERAPY | Facility: CLINIC | Age: 82
DRG: 391 | End: 2022-08-18
Attending: INTERNAL MEDICINE
Payer: MEDICARE

## 2022-08-18 ENCOUNTER — TELEPHONE (OUTPATIENT)
Dept: OTOLARYNGOLOGY | Facility: CLINIC | Age: 82
End: 2022-08-18

## 2022-08-18 LAB
ANION GAP SERPL CALCULATED.3IONS-SCNC: 8 MMOL/L (ref 5–18)
BUN SERPL-MCNC: 25 MG/DL (ref 8–28)
C COLI+JEJUNI+LARI FUSA STL QL NAA+PROBE: NOT DETECTED
CALCIUM SERPL-MCNC: 9.4 MG/DL (ref 8.5–10.5)
CHLORIDE BLD-SCNC: 105 MMOL/L (ref 98–107)
CO2 SERPL-SCNC: 20 MMOL/L (ref 22–31)
CREAT SERPL-MCNC: 1.51 MG/DL (ref 0.6–1.1)
EC STX1 GENE STL QL NAA+PROBE: NOT DETECTED
EC STX2 GENE STL QL NAA+PROBE: NOT DETECTED
GFR SERPL CREATININE-BSD FRML MDRD: 35 ML/MIN/1.73M2
GLUCOSE BLD-MCNC: 95 MG/DL (ref 70–125)
NOROV GI+II ORF1-ORF2 JNC STL QL NAA+PR: NOT DETECTED
POTASSIUM BLD-SCNC: 3.5 MMOL/L (ref 3.5–5)
POTASSIUM BLD-SCNC: 4 MMOL/L (ref 3.5–5)
PROCALCITONIN SERPL-MCNC: 0.13 NG/ML (ref 0–0.49)
RVA NSP5 STL QL NAA+PROBE: NOT DETECTED
SALMONELLA SP RPOD STL QL NAA+PROBE: NOT DETECTED
SHIGELLA SP+EIEC IPAH STL QL NAA+PROBE: NOT DETECTED
SODIUM SERPL-SCNC: 133 MMOL/L (ref 136–145)
V CHOL+PARA RFBL+TRKH+TNAA STL QL NAA+PR: NOT DETECTED
Y ENTERO RECN STL QL NAA+PROBE: NOT DETECTED

## 2022-08-18 PROCEDURE — 84145 PROCALCITONIN (PCT): CPT | Performed by: INTERNAL MEDICINE

## 2022-08-18 PROCEDURE — 96366 THER/PROPH/DIAG IV INF ADDON: CPT

## 2022-08-18 PROCEDURE — G0378 HOSPITAL OBSERVATION PER HR: HCPCS

## 2022-08-18 PROCEDURE — 92610 EVALUATE SWALLOWING FUNCTION: CPT | Mod: GN

## 2022-08-18 PROCEDURE — 84132 ASSAY OF SERUM POTASSIUM: CPT | Performed by: EMERGENCY MEDICINE

## 2022-08-18 PROCEDURE — 87506 IADNA-DNA/RNA PROBE TQ 6-11: CPT | Performed by: INTERNAL MEDICINE

## 2022-08-18 PROCEDURE — 99225 PR SUBSEQUENT OBSERVATION CARE,LEVEL II: CPT | Performed by: INTERNAL MEDICINE

## 2022-08-18 PROCEDURE — 250N000013 HC RX MED GY IP 250 OP 250 PS 637: Performed by: HOSPITALIST

## 2022-08-18 PROCEDURE — 250N000011 HC RX IP 250 OP 636: Performed by: EMERGENCY MEDICINE

## 2022-08-18 PROCEDURE — 80048 BASIC METABOLIC PNL TOTAL CA: CPT | Performed by: INTERNAL MEDICINE

## 2022-08-18 PROCEDURE — 36415 COLL VENOUS BLD VENIPUNCTURE: CPT | Performed by: EMERGENCY MEDICINE

## 2022-08-18 PROCEDURE — 250N000013 HC RX MED GY IP 250 OP 250 PS 637: Performed by: EMERGENCY MEDICINE

## 2022-08-18 PROCEDURE — 250N000013 HC RX MED GY IP 250 OP 250 PS 637: Performed by: INTERNAL MEDICINE

## 2022-08-18 PROCEDURE — 92526 ORAL FUNCTION THERAPY: CPT | Mod: GN

## 2022-08-18 PROCEDURE — 36415 COLL VENOUS BLD VENIPUNCTURE: CPT | Performed by: INTERNAL MEDICINE

## 2022-08-18 RX ORDER — NALOXONE HYDROCHLORIDE 0.4 MG/ML
0.2 INJECTION, SOLUTION INTRAMUSCULAR; INTRAVENOUS; SUBCUTANEOUS
Status: DISCONTINUED | OUTPATIENT
Start: 2022-08-18 | End: 2022-08-24 | Stop reason: HOSPADM

## 2022-08-18 RX ORDER — CEFTRIAXONE 1 G/1
1 INJECTION, POWDER, FOR SOLUTION INTRAMUSCULAR; INTRAVENOUS EVERY 24 HOURS
Status: DISCONTINUED | OUTPATIENT
Start: 2022-08-18 | End: 2022-08-21

## 2022-08-18 RX ORDER — TORSEMIDE 5 MG/1
10 TABLET ORAL DAILY
Status: DISCONTINUED | OUTPATIENT
Start: 2022-08-18 | End: 2022-08-20

## 2022-08-18 RX ORDER — DEXTROAMPHETAMINE SACCHARATE, AMPHETAMINE ASPARTATE, DEXTROAMPHETAMINE SULFATE AND AMPHETAMINE SULFATE 2.5; 2.5; 2.5; 2.5 MG/1; MG/1; MG/1; MG/1
20 TABLET ORAL 2 TIMES DAILY
Status: DISCONTINUED | OUTPATIENT
Start: 2022-08-18 | End: 2022-08-24 | Stop reason: HOSPADM

## 2022-08-18 RX ORDER — VENLAFAXINE HYDROCHLORIDE 150 MG/1
150 CAPSULE, EXTENDED RELEASE ORAL DAILY
Status: DISCONTINUED | OUTPATIENT
Start: 2022-08-18 | End: 2022-08-24 | Stop reason: HOSPADM

## 2022-08-18 RX ORDER — NALOXONE HYDROCHLORIDE 0.4 MG/ML
0.4 INJECTION, SOLUTION INTRAMUSCULAR; INTRAVENOUS; SUBCUTANEOUS
Status: DISCONTINUED | OUTPATIENT
Start: 2022-08-18 | End: 2022-08-24 | Stop reason: HOSPADM

## 2022-08-18 RX ORDER — LEVOTHYROXINE SODIUM 50 UG/1
50 TABLET ORAL DAILY
Status: DISCONTINUED | OUTPATIENT
Start: 2022-08-18 | End: 2022-08-24 | Stop reason: HOSPADM

## 2022-08-18 RX ORDER — HYDROCODONE BITARTRATE AND ACETAMINOPHEN 5; 325 MG/1; MG/1
1 TABLET ORAL EVERY 4 HOURS PRN
Status: DISCONTINUED | OUTPATIENT
Start: 2022-08-18 | End: 2022-08-24 | Stop reason: HOSPADM

## 2022-08-18 RX ORDER — ONDANSETRON 4 MG/1
4 TABLET, ORALLY DISINTEGRATING ORAL EVERY 6 HOURS PRN
Status: DISCONTINUED | OUTPATIENT
Start: 2022-08-18 | End: 2022-08-20

## 2022-08-18 RX ORDER — CARVEDILOL 6.25 MG/1
6.25 TABLET ORAL 2 TIMES DAILY WITH MEALS
Status: DISCONTINUED | OUTPATIENT
Start: 2022-08-18 | End: 2022-08-24 | Stop reason: HOSPADM

## 2022-08-18 RX ORDER — DIPHENOXYLATE HCL/ATROPINE 2.5-.025MG
1 TABLET ORAL 4 TIMES DAILY PRN
Status: DISCONTINUED | OUTPATIENT
Start: 2022-08-18 | End: 2022-08-18

## 2022-08-18 RX ORDER — TORSEMIDE 5 MG/1
5 TABLET ORAL DAILY
Status: DISCONTINUED | OUTPATIENT
Start: 2022-08-18 | End: 2022-08-24

## 2022-08-18 RX ORDER — HYDROCODONE BITARTRATE AND ACETAMINOPHEN 5; 325 MG/1; MG/1
1 TABLET ORAL EVERY 6 HOURS PRN
Status: DISCONTINUED | OUTPATIENT
Start: 2022-08-18 | End: 2022-08-18

## 2022-08-18 RX ORDER — TRAZODONE HYDROCHLORIDE 100 MG/1
400 TABLET ORAL AT BEDTIME
Status: DISCONTINUED | OUTPATIENT
Start: 2022-08-18 | End: 2022-08-24 | Stop reason: HOSPADM

## 2022-08-18 RX ORDER — AZELASTINE 1 MG/ML
2 SPRAY, METERED NASAL 2 TIMES DAILY PRN
Status: DISCONTINUED | OUTPATIENT
Start: 2022-08-18 | End: 2022-08-24 | Stop reason: HOSPADM

## 2022-08-18 RX ORDER — DOXYCYCLINE 100 MG/10ML
100 INJECTION, POWDER, LYOPHILIZED, FOR SOLUTION INTRAVENOUS EVERY 12 HOURS
Status: DISCONTINUED | OUTPATIENT
Start: 2022-08-18 | End: 2022-08-20

## 2022-08-18 RX ORDER — MONTELUKAST SODIUM 4 MG/1
2 TABLET, CHEWABLE ORAL
Status: DISCONTINUED | OUTPATIENT
Start: 2022-08-18 | End: 2022-08-23

## 2022-08-18 RX ORDER — DIPHENOXYLATE HCL/ATROPINE 2.5-.025MG
2 TABLET ORAL 4 TIMES DAILY PRN
Status: DISCONTINUED | OUTPATIENT
Start: 2022-08-18 | End: 2022-08-19

## 2022-08-18 RX ORDER — OLOPATADINE HYDROCHLORIDE 1 MG/ML
1 SOLUTION/ DROPS OPHTHALMIC 2 TIMES DAILY
Status: DISCONTINUED | OUTPATIENT
Start: 2022-08-18 | End: 2022-08-24 | Stop reason: HOSPADM

## 2022-08-18 RX ORDER — ROSUVASTATIN CALCIUM 10 MG/1
10 TABLET, COATED ORAL DAILY
Status: DISCONTINUED | OUTPATIENT
Start: 2022-08-18 | End: 2022-08-23

## 2022-08-18 RX ORDER — ONDANSETRON 2 MG/ML
4 INJECTION INTRAMUSCULAR; INTRAVENOUS EVERY 6 HOURS PRN
Status: DISCONTINUED | OUTPATIENT
Start: 2022-08-18 | End: 2022-08-20

## 2022-08-18 RX ORDER — ZONISAMIDE 25 MG/1
50 CAPSULE ORAL AT BEDTIME
Status: DISCONTINUED | OUTPATIENT
Start: 2022-08-18 | End: 2022-08-24 | Stop reason: HOSPADM

## 2022-08-18 RX ORDER — ALBUTEROL SULFATE 90 UG/1
2 AEROSOL, METERED RESPIRATORY (INHALATION) EVERY 6 HOURS
Status: DISCONTINUED | OUTPATIENT
Start: 2022-08-18 | End: 2022-08-24 | Stop reason: HOSPADM

## 2022-08-18 RX ORDER — METHOCARBAMOL 500 MG/1
1500 TABLET, FILM COATED ORAL 2 TIMES DAILY
Status: DISCONTINUED | OUTPATIENT
Start: 2022-08-18 | End: 2022-08-24 | Stop reason: HOSPADM

## 2022-08-18 RX ADMIN — TRAZODONE HYDROCHLORIDE 400 MG: 150 TABLET ORAL at 20:48

## 2022-08-18 RX ADMIN — ALBUTEROL SULFATE 2 PUFF: 90 AEROSOL, METERED RESPIRATORY (INHALATION) at 02:11

## 2022-08-18 RX ADMIN — METHOCARBAMOL TABLETS 1500 MG: 500 TABLET, COATED ORAL at 08:56

## 2022-08-18 RX ADMIN — HYDROCODONE BITARTRATE AND ACETAMINOPHEN 1 TABLET: 5; 325 TABLET ORAL at 03:34

## 2022-08-18 RX ADMIN — DEXTROAMPHETAMINE SACCHARATE, AMPHETAMINE ASPARTATE, DEXTROAMPHETAMINE SULFATE AND AMPHETAMINE SULFATE 20 MG: 2.5; 2.5; 2.5; 2.5 TABLET ORAL at 09:10

## 2022-08-18 RX ADMIN — ALBUTEROL SULFATE 2 PUFF: 90 AEROSOL, METERED RESPIRATORY (INHALATION) at 18:04

## 2022-08-18 RX ADMIN — OLOPATADINE HYDROCHLORIDE 1 DROP: 1.11 SOLUTION/ DROPS OPHTHALMIC at 20:58

## 2022-08-18 RX ADMIN — CEFTRIAXONE 1 G: 1 INJECTION, POWDER, FOR SOLUTION INTRAMUSCULAR; INTRAVENOUS at 04:45

## 2022-08-18 RX ADMIN — TORSEMIDE 10 MG: 5 TABLET ORAL at 08:56

## 2022-08-18 RX ADMIN — DIPHENOXYLATE HYDROCHLORIDE AND ATROPINE SULFATE 2 TABLET: 2.5; .025 TABLET ORAL at 12:30

## 2022-08-18 RX ADMIN — METHOCARBAMOL TABLETS 1500 MG: 500 TABLET, COATED ORAL at 20:49

## 2022-08-18 RX ADMIN — DOXYCYCLINE 100 MG: 100 INJECTION, POWDER, LYOPHILIZED, FOR SOLUTION INTRAVENOUS at 16:36

## 2022-08-18 RX ADMIN — FLUTICASONE FUROATE 1 PUFF: 100 POWDER RESPIRATORY (INHALATION) at 08:56

## 2022-08-18 RX ADMIN — VENLAFAXINE HYDROCHLORIDE 150 MG: 150 CAPSULE, EXTENDED RELEASE ORAL at 08:56

## 2022-08-18 RX ADMIN — HYDROCODONE BITARTRATE AND ACETAMINOPHEN 1 TABLET: 5; 325 TABLET ORAL at 11:20

## 2022-08-18 RX ADMIN — APIXABAN 5 MG: 5 TABLET, FILM COATED ORAL at 01:41

## 2022-08-18 RX ADMIN — TRAZODONE HYDROCHLORIDE 400 MG: 150 TABLET ORAL at 01:41

## 2022-08-18 RX ADMIN — METHOCARBAMOL TABLETS 1500 MG: 500 TABLET, COATED ORAL at 01:40

## 2022-08-18 RX ADMIN — Medication 5000 UNITS: at 08:57

## 2022-08-18 RX ADMIN — HYDROCODONE BITARTRATE AND ACETAMINOPHEN 1 TABLET: 5; 325 TABLET ORAL at 17:53

## 2022-08-18 RX ADMIN — APIXABAN 5 MG: 5 TABLET, FILM COATED ORAL at 08:56

## 2022-08-18 RX ADMIN — ZONISAMIDE 50 MG: 25 CAPSULE ORAL at 20:48

## 2022-08-18 RX ADMIN — DOXYCYCLINE 100 MG: 100 INJECTION, POWDER, LYOPHILIZED, FOR SOLUTION INTRAVENOUS at 05:16

## 2022-08-18 RX ADMIN — ROSUVASTATIN CALCIUM 10 MG: 10 TABLET, FILM COATED ORAL at 20:49

## 2022-08-18 RX ADMIN — HYDROCODONE BITARTRATE AND ACETAMINOPHEN 1 TABLET: 5; 325 TABLET ORAL at 22:41

## 2022-08-18 RX ADMIN — APIXABAN 5 MG: 5 TABLET, FILM COATED ORAL at 20:49

## 2022-08-18 RX ADMIN — OLOPATADINE HYDROCHLORIDE 1 DROP: 1.11 SOLUTION/ DROPS OPHTHALMIC at 08:57

## 2022-08-18 RX ADMIN — CARVEDILOL 6.25 MG: 6.25 TABLET, FILM COATED ORAL at 08:56

## 2022-08-18 RX ADMIN — MONTELUKAST SODIUM 2 G: 4 TABLET, CHEWABLE ORAL at 11:17

## 2022-08-18 RX ADMIN — CARVEDILOL 6.25 MG: 6.25 TABLET, FILM COATED ORAL at 01:41

## 2022-08-18 RX ADMIN — LEVOTHYROXINE SODIUM 50 MCG: 0.05 TABLET ORAL at 06:55

## 2022-08-18 ASSESSMENT — ACTIVITIES OF DAILY LIVING (ADL)
ADLS_ACUITY_SCORE: 37
ADLS_ACUITY_SCORE: 41
DEPENDENT_IADLS:: INDEPENDENT
ADLS_ACUITY_SCORE: 41
ADLS_ACUITY_SCORE: 38
ADLS_ACUITY_SCORE: 38
ADLS_ACUITY_SCORE: 41
ADLS_ACUITY_SCORE: 38
ADLS_ACUITY_SCORE: 37
ADLS_ACUITY_SCORE: 35
ADLS_ACUITY_SCORE: 41
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
## 2022-08-18 NOTE — TELEPHONE ENCOUNTER
---- Message -----  From: Luz Elena Ybarra MD  Sent: 8/17/2022   4:40 PM CDT  To: Hanna Hastings    Yes, increase Crestor to 20 mg at bedtime to see if she can tolerate it.  Thanks  Wilbert    ----- Message -----  From: Hanna Hastings  Sent: 8/17/2022   4:29 PM CDT  To: Luz Elena Ybarra MD    I am sorry Dr. Ybarra. To clarify pt is on crestor 10mg at bedtime currently. Do you want to increase?    Thank you  ----- Message -----  From: Luz Elena Ybarra MD  Sent: 8/17/2022   4:27 PM CDT  To: Hanna Hastings    Please start Crestor 10 mg at bedtime.  Thanks  Wilbert      ----- Message -----  From: Hanna Hastings  Sent: 8/17/2022   1:53 PM CDT  To: Luz Elena Ybarra MD    ----- Message from Hanna Hastings sent at 8/17/2022  1:53 PM CDT -----  Please review encounter.any further recommendations?-JUAN  ----- Message from Hanna Hastings sent at 8/15/2022  1:36 PM CDT -----  Please see encounter. Any further recommendations.? Thank you-JUAN

## 2022-08-18 NOTE — PLAN OF CARE
PRIMARY DIAGNOSIS: GENERALIZED WEAKNESS    OUTPATIENT/OBSERVATION GOALS TO BE MET BEFORE DISCHARGE  1. Orthostatic performed: NO    2. Tolerating PO medications: YES    3. Return to near baseline physical activity: YES    4. Cleared for discharge by consultants No    Discharge Planner Nurse   Safe discharge environment identified: No  Barriers to discharge: Swallow Study on 9/19       Entered by: Eli Vizcarra RN 08/18/2022 2:59 PM  Patient to have a video swallow study tomorrow with esophogram. Tolerating regular diet with aspiration precautions. Patient having loose, watery, green stools (PRN lomotil given). Stool sample sent to patient. PRN NORCO given for lower back pain.

## 2022-08-18 NOTE — PROVIDER NOTIFICATION
0203: Paged provider notifying pt c/o pain rated 6/10 and pt asking for another dose of Norco. Also notified provider pt is requesting her bedtime zonisamide medication.

## 2022-08-18 NOTE — ED TRIAGE NOTES
Pt arrives with diarrhea for the last 8-9 weeks. States its about 10-15 times a day and is now incontinent sometimes. Hypotensive in triage.      Triage Assessment     Row Name 08/17/22 9942       Triage Assessment (Adult)    Airway WDL WDL       Respiratory WDL    Respiratory WDL WDL       Skin Circulation/Temperature WDL    Skin Circulation/Temperature WDL WDL       Cardiac WDL    Cardiac WDL WDL       Peripheral/Neurovascular WDL    Peripheral Neurovascular WDL WDL       Cognitive/Neuro/Behavioral WDL    Cognitive/Neuro/Behavioral WDL WDL
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
## 2022-08-18 NOTE — PLAN OF CARE
"PRIMARY DIAGNOSIS: \"GENERIC\" NURSING  OUTPATIENT/OBSERVATION GOALS TO BE MET BEFORE DISCHARGE:  ADLs back to baseline: Yes    Activity and level of assistance: Up with standby assistance.    Pain status: Improved-controlled with oral pain medications.    Return to near baseline physical activity: Yes     Discharge Planner Nurse   Safe discharge environment identified: No  Barriers to discharge: Yes       Entered by: Dorcas Lincoln RN 08/18/2022 5:22 AM                           "

## 2022-08-18 NOTE — PROGRESS NOTES
"[facility]    Medicine Progress Note - Hospitalist Service    Date of Admission:  8/17/2022    Assessment & Plan                 81 year old female with multiple comorbidities including severe dyslipidemia, atherosclerosis, ckd into the ER for 8 weeks of watery diarrhea.  Pt denies associative symptoms as described above including blood, fevers, antibiotics, cramping or vomiting. She has been on praluent injections every 2 weeks with  The last dose being on Sunday.  She feels when the cardiologist doubled  Her dose the diarrhea began.  Pt has a history of subtotal colectomy done years  Ago for a \"congenital issue\".      ER diagnostics show a potassium of 3.0, a mild bump in creatinine from 1.83 to 1.93 and a hemoglobin of 12.5.  She is afebrile with a soft abdomen that is nonfocal  CT imaging done on arrival of the abdomen/pelvis is negative for acute pathology though she has some groundglass in the RLL.  Pt denies cough, chest symptoms.    Pt agrees for admission with clinical goals to be electrolyte replacement, ivf support and empiric antibiotics for the RLL pneumonia.      Problem list:     1.  Diarrhea in patient with history of subtotal colectomy: possibly medication  induced ; enteric panel;No need for c diff at this time;  2.  Hypokalemia: due to diarrhea; oral replacement (she had 1 krun in the ER)  3.  Dyslipidemia with history of vascular disease: her last LDL was 182 1 month ago; outpatient cardiology for further recs  4.  Chronic anticoagulation due to PE: continue eliquis  5.  History of right nephrectomy: avoid toxins, mindful fluid balance  6.  CKD 4: creatinine better at 1.51.  7.  CAP: RLL infilitrate though no cough, chest symptoms; tx with rocephin and              Doxy as  pt had pulmonary appt on 8/3              SLP eval.      Interval History     + diarrhea which is green. No fevers/chills/SOB/productive cough.    Data reviewed today: I reviewed all medications, new " labs and imaging results over the last 24 hours. I personally reviewed no images or EKG's today.    Physical Exam   Vital Signs: Temp: 98.5  F (36.9  C) Temp src: Oral BP: 96/53 Pulse: 63   Resp: 16 SpO2: 96 % O2 Device: None (Room air) Oxygen Delivery: 2 LPM  Weight: 148 lbs 0 oz    Gen - AAO x 3 in NAD.  Lungs - CTA B.  Heart - RR,S1+S2 nml, no m/g/r.  Abd - soft, NT, ND + BS.  Ext - no edema.    Data   Recent Labs   Lab 08/18/22  0143 08/17/22  1922   WBC  --  5.4   HGB  --  12.5   MCV  --  91   PLT  --  266   * 134*   POTASSIUM 4.0  3.5 3.0*   CHLORIDE 105 101   CO2 20* 21*   BUN 25 29*   CR 1.51* 1.93*   ANIONGAP 8 12   SRINIVAS 9.4 10.0   GLC 95 103   ALBUMIN  --  4.0   PROTTOTAL  --  7.6   BILITOTAL  --  0.5   ALKPHOS  --  103   ALT  --  154*   AST  --  91*     Recent Results (from the past 24 hour(s))   CT Abdomen Pelvis w/o Contrast    Narrative    EXAM: CT ABDOMEN PELVIS W/O CONTRAST  LOCATION: [facility]  DATE/TIME: 8/17/2022 8:53 PM    INDICATION: abdominal pain  COMPARISON: 4/15/2021  TECHNIQUE: CT scan of the abdomen and pelvis was performed without IV contrast. Multiplanar reformats were obtained. Dose reduction techniques were used.  CONTRAST: None.    FINDINGS:   LOWER CHEST: Faint patches of groundglass attenuation in the right lower lobe, also present on the prior study, but more extensive currently. Calcified mitral annulus.    HEPATOBILIARY: Liver cysts. Cholecystectomy. Mild bile duct dilation from reservoir effect.    PANCREAS: Normal.    SPLEEN: Normal.    ADRENAL GLANDS: Normal.    KIDNEYS/BLADDER: Tiny nonobstructing calyceal tip stone upper pole left kidney. Right nephrectomy.    BOWEL: Partial colectomy with RLQ anastomosis.    LYMPH NODES: Normal.    VASCULATURE: Marked calcified atherosclerosis.    PELVIC ORGANS: Hysterectomy. Pelvic phleboliths.    MUSCULOSKELETAL: Calcified injection granulomas in the buttocks. Osteoporosis. Mild degenerative change in the  spine.      Impression    IMPRESSION:   1.  No findings to account for abdominal pain  2.  Areas of groundglass attenuation in the right lower lobe could represent pneumonia or bronchiolitis. Request correlation with any chest symptoms. Consider aspiration, given presence of similar findings on the prior study.     XR Chest 2 Views    Narrative    EXAM: XR CHEST 2 VIEWS  LOCATION: [facility]  DATE/TIME: 8/17/2022 10:03 PM    INDICATION: RLL consolidation on CT abdomen pelvis  COMPARISON: CT performed earlier today      Impression    IMPRESSION:     Hazy opacities are noted in the right lower lobe which likely correlate with the findings seen on earlier CT. The cardiomediastinal silhouette is within normal limits. Cholecystectomy clips are noted in the abdomen. No acute osseous injury.     Medications       albuterol  2 puff Inhalation Q6H     amphetamine-dextroamphetamine  20 mg Oral BID     apixaban ANTICOAGULANT  5 mg Oral BID     carvedilol  6.25 mg Oral BID w/meals     cefTRIAXone  1 g Intravenous Q24H     colestipol  2 g Oral Daily with lunch     doxycycline (VIBRAMYCIN) IV  100 mg Intravenous Q12H     fluticasone  1 puff Inhalation Daily     levothyroxine  50 mcg Oral Daily     methocarbamol  1,500 mg Oral BID     olopatadine  1 drop Both Eyes BID     rosuvastatin  10 mg Oral Daily     torsemide  10 mg Oral Daily     torsemide  5 mg Oral Daily     traZODone  400 mg Oral At Bedtime     venlafaxine  150 mg Oral Daily     Vitamin D3  5,000 Units Oral Daily     zonisamide  50 mg Oral At Bedtime

## 2022-08-18 NOTE — PHARMACY-ADMISSION MEDICATION HISTORY
Pharmacy Note - Admission Medication History    Pertinent Provider Information:    ______________________________________________________________________    Prior To Admission (PTA) med list completed and updated in EMR.       PTA Med List   Medication Sig Last Dose     albuterol (PROAIR HFA/PROVENTIL HFA/VENTOLIN HFA) 108 (90 Base) MCG/ACT inhaler Inhale 2 puffs into the lungs every 6 hours prn at pt supplied     allopurinol (ZYLOPRIM) 100 MG tablet Take 1 tablet (100 mg) by mouth 2 times daily (Patient taking differently: Take 200 mg by mouth daily) 8/17/2022     amphetamine-dextroamphetamine (ADDERALL) 20 MG tablet Take 1 tablet (20 mg) by mouth 2 times daily May fill 7/26/22 8/17/2022 at x2     apixaban ANTICOAGULANT (ELIQUIS) 5 MG tablet Take 1 tablet (5 mg) by mouth 2 times daily 8/17/2022 at x1     azelastine (ASTEPRO) 0.15 % nasal spray Spray 2 sprays into both nostrils 2 times daily as needed prn at does not have     carvedilol (COREG) 6.25 MG tablet Take 1 tablet (6.25 mg) by mouth 2 times daily (with meals) 8/17/2022 at x1     Cholecalciferol (VITAMIN D-3) 125 MCG (5000 UT) TABS Take 5,000 Units by mouth daily 8/17/2022     colestipol (COLESTID) 1 g tablet Take 2 g by mouth daily (with lunch) 8/17/2022     diphenoxylate-atropine (LOMOTIL) 2.5-0.025 MG tablet Take 1 tablet by mouth 4 times daily as needed for diarrhea (Patient taking differently: Take 1-2 tablets by mouth 4 times daily as needed for diarrhea) 8/17/2022 at x2     fluticasone (ARNUITY ELLIPTA) 50 MCG/ACT inhaler Inhale 1 puff into the lungs daily 8/17/2022 at does not have     HEMP OIL OR EXTRACT OR OTHER CBD CANNABINOID, NOT MEDICAL CANNABIS, 50 mg At Bedtime Delta 8 Gummies 8/16/2022     levothyroxine (SYNTHROID/LEVOTHROID) 50 MCG tablet Take 1 tablet (50 mcg) by mouth daily 8/17/2022     lidocaine (LIDODERM) 5 % patch Place 1 patch onto the skin every 24 hours To prevent lidocaine toxicity, patient should be patch free for 12 hrs daily.  none on     methocarbamol (ROBAXIN) 500 MG tablet Take 3 tablets (1,500 mg) by mouth 2 times daily 8/17/2022 at x1     olopatadine (PATANOL) 0.1 % ophthalmic solution Place 1 drop into both eyes 2 times daily (Patient taking differently: Place 1 drop into both eyes 2 times daily as needed) prn at does not have     rosuvastatin (CRESTOR) 10 MG tablet Take 1 tablet (10 mg) by mouth daily 8/16/2022 at HS     torsemide (DEMADEX) 5 MG tablet TAKE TWO TABLETS BY MOUTH EVERY MORNING AND ONE TABLET WITH LUNCH. HOLD FOR SYSTOLIC BLOOD PRESSURE UNDER 100. 8/17/2022 at x3tab     traZODone (DESYREL) 100 MG tablet TAKE 3 TO 4 TABLETS BY MOUTH AT BEDTIME (Patient taking differently: Take 400 mg by mouth At Bedtime) 8/16/2022     venlafaxine (EFFEXOR XR) 150 MG 24 hr capsule Take 1 capsule (150 mg) by mouth daily 8/17/2022     zonisamide (ZONEGRAN) 25 MG capsule Take 2 capsules (50 mg) by mouth At Bedtime 8/16/2022       Information source(s): Patient and CareEverywhere/[facility]    Method of interview communication: in-person    Patient was asked about OTC/herbal products specifically.  PTA med list reflects this.    Based on the pharmacist's assessment, the PTA med list information appears reliable    Allergies were reviewed, assessed, and updated with the patient.      Medications available for use during hospital stay: albuterol.      Thank you for the opportunity to participate in the care of this patient.      Francy Flores [facility]     8/17/2022     8:48 PM

## 2022-08-18 NOTE — PLAN OF CARE
"PRIMARY DIAGNOSIS: \"GENERIC\" NURSING  OUTPATIENT/OBSERVATION GOALS TO BE MET BEFORE DISCHARGE:  1. ADLs back to baseline: Yes    2. Activity and level of assistance: Up with standby assistance.    3. Pain status: Improved-controlled with oral pain medications.    4. Return to near baseline physical activity: Yes     Discharge Planner Nurse   Safe discharge environment identified: No  Barriers to discharge: Yes       Entered by: Dorcas Lincoln RN 08/18/2022 5:51 AM           Pt c/o 6/10 chronic \"joint\" pain, prn Norco administered with relief. O2 sats above 90% while awake, dropping to 80s occasionally while sleeping; placed on 2 LPM O2 NC while sleeping. Ambulated to bathroom with SBA and gait belt. Voided. Pt had one watery, green stool at 0150 (prior to order for stool sample, no additional stools thus far). Call light within reach; pt using call light appropriately.               "

## 2022-08-18 NOTE — PROGRESS NOTES
"CLINICAL SWALLOW EVALUATION WITH SPEECH PATHOLOGY     08/18/22 1230   General Information   Onset of Illness/Injury or Date of Surgery 08/17/22   Referring Physician Dr. Ya   Pertinent History of Current Problem Per H&P, \"81 year old female with multiple comorbidities including severe dyslipidemia, atherosclerosis, ckd into the ER for 8 weeks of watery diarrhea.\" Note also states, \"CT imaging done on arrival of the abdomen/pelvis is negative for acute pathology though she has some groundglass in the RLL. Pt denies cough, chest symptoms.\" Recent ENT evaluation revealed L vocal fold paralysis. Given this finding, there is some concern for silent aspiration.   General Observations Patient pleasant and cooperative.   Past History of Dysphagia Patient reports that dry foods sometimes stick in her throat. She denies coughing with liquids/solids. Writer is unable to locate prior swallow evaluation(s) with Speech Therapy in cursory review of previous records.   Type of Evaluation   Type of Evaluation Swallow Evaluation   Oral Motor   Oral Musculature generally intact   Mucosal Quality good   Dentition (Oral Motor)   Dentition (Oral Motor) natural dentition;adequate dentition   Facial Symmetry (Oral Motor)   Facial Symmetry (Oral Motor) WNL   Lip Function (Oral Motor)   Lip Range of Motion (Oral Motor) WNL   Lip Strength (Oral Motor) WNL   Tongue Function (Oral Motor)   Tongue ROM (Oral Motor) WNL   Jaw Function (Oral Motor)   Jaw Function (Oral Motor) WNL   Cough/Swallow/Gag Reflex (Oral Motor)   Volitional Throat Clear/Cough (Oral Motor) WNL   Volitional Swallow (Oral Motor) WNL   Vocal Quality/Secretion Management (Oral Motor)   Vocal Quality (Oral Motor) WNL   Secretion Management (Oral Motor) WNL   Comment, Vocal Quality/Secretion Management (Oral Motor) Patient's voice is clear and strong. Minimal to no dysphonia noted.   General Swallowing Observations   Comment, General Swallowing Observations Per RN report, " "patient coughed when eating a cracker last night, but has had no overt s/s of aspiration with PO intake thus far today.   Respiratory Support (General Swallowing Observations) none   Current Diet/Method of Nutritional Intake (General Swallowing Observations, NIS) regular diet;thin liquids (level 0)   Swallowing Evaluation Clinical swallow evaluation   Clinical Swallow Evaluation   Feeding Assistance no assistance needed   Clinical Swallow Evaluation Textures Trialed thin liquids;solid foods   Clinical Swallow Eval: Thin Liquid Texture Trial   Mode of Presentation, Thin Liquids cup;straw;self-fed   Volume of Liquid or Food Presented ~4 ounces   Oral Phase of Swallow WFL   Pharyngeal Phase of Swallow intact  (No overt s/s of aspiration)   Diagnostic Statement Subjectively, swallow response appeared timely. No overt clinical s/s of aspiration observed.   Clinical Swallow Evaluation: Solid Food Texture Trial   Mode of Presentation self-fed   Volume Presented 6 bites   Oral Phase WFL   Pharyngeal Phase intact  (No overt s/s of aspiration)   Diagnostic Statement Patient demonstrated effective & timely mastication and good oral clearance. No overt clinical s/s of aspiration observed. Patient denied globus sensation.   Esophageal Phase of Swallow   Patient reports or presents with symptoms of esophageal dysphagia Yes   Esophageal comments Patient's symptom of dry foods \"sticking\" in her throat is suggestive of esophageal dysmotility issue(s).   Swallowing Recommendations   Diet Consistency Recommendations regular diet;thin liquids (level 0)   Supervision Level for Intake distant supervision needed   Mode of Delivery Recommendations bolus size, small;slow rate of intake   Swallowing Maneuver Recommendations alternate food and liquid intake   Monitoring/Assistance Required (Eating/Swallowing) stop eating activities when fatigue is present;monitor for cough or change in vocal quality with intake   Recommended Feeding/Eating " Techniques (Swallow Eval) maintain upright posture during/after eating for 30 minutes   Medication Administration Recommendations, Swallowing (SLP) Per patient preference   Instrumental Assessment Recommendations VFSS (videofluoroscopic swallowing study)   Comment, Swallowing Recommendations Recommend video swallow study to rule out silent aspiration and esophagram due to suspected esophageal dysmotility issue(s).   General Therapy Interventions   Planned Therapy Interventions Dysphagia Treatment   Dysphagia treatment Instruction of safe swallow strategies;Compensatory strategies for swallowing   Clinical Impression   Criteria for Skilled Therapeutic Interventions Met (SLP Eval) Yes, treatment indicated   SLP Diagnosis Dysphagia; rule out silent aspiration   Risks & Benefits of therapy have been explained evaluation/treatment results reviewed;care plan/treatment goals reviewed;participants voiced agreement with care plan;participants included;patient   Clinical Impression Comments Clinical swallow evaluation completed per MD order. No oral dysphagia noted. Pharyngeal dysphagia is not suspected due to absence of s/s of aspiration, however, cannot rule out silent aspiration with clinical evaluation at bedside. Given recent ENT diagnosis of L vocal fold paralysis, patient is at greater risk for silent aspiration. Recommend instrumental evaluation of swallow via video swallow study. Further recommend an esophagram as patient describes symptoms suggestive of esophageal dysmotility issue(s). At this time, there is insufficient evidence to downgrade diet textures or liquids. Patient may continue current diet of Regular textures and thin liquids with the swallowing strategies listed above.   SLP Discharge Planning   SLP Discharge Recommendation home with assist   SLP Brief overview of current status  Continue current diet of Regular textures and thin liquids at this time. Patient to sit fully upright for all intake, eat  slowly, take small bites/sips, alternate bite/sip, andd moisten drier foods with condiments, sauce, gravy, etc. Video swallow study and esophagram scheduled for 0800 on 8/19/22.    Total Evaluation Time   Total Evaluation Time (Minutes) 15

## 2022-08-18 NOTE — CONSULTS
"Care Management Initial Consult    General Information  Assessment completed with: Patient,    Type of CM/SW Visit: Initial Assessment  Primary Care Provider verified and updated as needed: Yes   Readmission within the last 30 days: no previous admission in last 30 days   Reason for Consult: discharge planning, health care directive, transportation  Advance Care Planning: Advance Care Planning Reviewed: no concerns identified, present on chart        Communication Assessment  Patient's communication style: spoken language (English or Bilingual)        Cognitive  Cognitive/Neuro/Behavioral: WDL             Mood/Behavior: cooperative, calm          Living Environment:   People in home: alone     Current living Arrangements: apartment (\"Condo\".)      Able to return to prior arrangements: yes  Living Arrangement Comments: Lives alone    Family/Social Support:  Care provided by: self  Provides care for: no one  Marital Status:   Children (Two daughters who work or are not in-town. Friends assist as needed/able as well.)          Description of Support System: Supportive, Involved (As needed/able)    Support Assessment: Adequate family and caregiver support, Adequate social supports    Current Resources:   Patient receiving home care services: No  Community Resources: [place] Programs (SNAP)  Equipment currently used at home: grab bar, tub/shower (x3)  Supplies currently used at home: Hearing Aid Batteries    Employment/Financial:  Employment Status: retired     Financial Concerns: No concerns identified   Referral to Financial Worker: No     Lifestyle & Psychosocial Needs:  Social Determinants of Health     Tobacco Use: Medium Risk     Smoking Tobacco Use: Former Smoker     Smokeless Tobacco Use: Never Used   Alcohol Use: Unknown     Frequency of Alcohol Consumption: Monthly or less     Average Number of Drinks: Not asked     Frequency of Binge Drinking: Not asked   Financial Resource Strain: Medium Risk     " "Difficulty of Paying Living Expenses: Somewhat hard   Food Insecurity: No Food Insecurity     Worried About Running Out of Food in the Last Year: Never true     Ran Out of Food in the Last Year: Never true   Transportation Needs: No Transportation Needs     Lack of Transportation (Medical): No     Lack of Transportation (Non-Medical): No   Physical Activity: Not on file   Stress: Not on file   Social Connections: Not on file   Intimate Partner Violence: Not on file   Depression: Not at risk     PHQ-2 Score: 2   Housing Stability: Low Risk      Unable to Pay for Housing in the Last Year: No     Number of Places Lived in the Last Year: 1     Unstable Housing in the Last Year: No     Functional Status:  Prior to admission patient needed assistance:   Dependent ADLs:: Independent (Pt states independence at baseline.)  Dependent IADLs:: Independent (Pt states independence at baseline.)  Assessment of Functional Status: Not at  functional baseline    Mental Health Status:  Mental Health Status: No Current Concerns       Chemical Dependency Status:  Chemical Dependency Status: No Current Concerns           Values/Beliefs:  Spiritual, Cultural Beliefs, Gnosticist Practices, Values that affect care: no (None stated to writer.)             Additional Information:  Writer met with pt to provide ALEMAN info., introduce Care Management(CM), and obtain an initial assessment. Pt reports living in a Condo alone. Pt reports total I/ADL independence at baseline and has daughters and friends available as needed/able. No DME use reported or the need for any in-home services. Pt states her daughter works and so transport may be an issue.    8/17 per ,MARTÍN.-\"81 year old female presents to the Emergency Department for evaluation of diarrhea.  She reports that she has been having diarrhea for over 8 weeks.  She is followed by [facility], given her persistent symptoms of over 10 episodes of diarrhea a day, she had been ill " "initiated on Lomotil, and is taking twice the dose.  She was feeling increasingly weak, fatigued, lightheaded today.  Upon arrival, patient was hypotensive. She is fluid responsive. Laboratory studies revealed the patient also has a low potassium of 3.0 which is acute for the patient.  Patient was responsive to fluids.  10 mEq of potassium was given IV.  LFTs are slightly elevated. Given symptomatic hypokalemia with ongoing diarrhea, patient will be admitted for continued repletion.\"    SLP Consult pending. Anticipate return home with possible outpatient speech therapy. No other anticipated discharge needs other than possibly for transportation. Depending on the time of day, pt may or may not have a ride available. Pt wants to avoid paying for transport and will attempt to find a ride when discharge is known. CM to follow for changes in current anticipated discharge disposition and offer transportation assist if needed.    Sha No RN        "

## 2022-08-18 NOTE — PROGRESS NOTES
[facility]      OUTPATIENT SPEECH LANGUAGE PATHOLOGY EVALUATION  PLAN OF TREATMENT FOR OUTPATIENT REHABILITATION  (COMPLETE FOR INITIAL CLAIMS ONLY)  Patient's Last Name, First Name, M.I.  YOB: 1940  Sandy Spencer                        Provider's Name  [facility] Medical Record No.  [id number]                               Onset Date:  08/17/22  Start of Care Date: 08/18/22    Type:     ___PT   ___OT   _X_SLP Medical Diagnosis: Community acquired pneumonia          SLP Diagnosis:  Dysphagia; rule out silent aspiration  Visits from SOC:  1   ________________________________________________________________  Plan of Treatment/Functional Goals    Planned Interventions:   Dysphagia Treatment       Instruction of safe swallow strategies, Compensatory strategies for swallowing        Goals: See Speech Language Pathology Goals on Care Plan in [facility] electronic health record.    Therapy Frequency:daily   Predicted Duration of Therapy Intervention: 08/25/22    ________________________________________________________________________________    I CERTIFY THE NEED FOR THESE SERVICES FURNISHED UNDER        THIS PLAN OF TREATMENT AND WHILE UNDER MY CARE     (Physician co-signature of this document indicates review and certification of the therapy plan).              Certification date from: 08/18/22 Certification date to: 08/25/22    Referring Physician: Dr. Ya           Initial Assessment        See Speech Language Pathology documentation in Epic electronic health record, evaluation dated  08/18/22

## 2022-08-18 NOTE — TELEPHONE ENCOUNTER
[facility] Call Center    Phone Message    May a detailed message be left on voicemail: yes     Reason for Call: Other: Pt called to say she is in the hospital and has pneumonia in her right lung. She just wanted to let Dr. Prasad know. Thank you     Action Taken: Message routed to:  [facility] ENT    Travel Screening: Not Applicable                                                                       none

## 2022-08-19 ENCOUNTER — APPOINTMENT (OUTPATIENT)
Dept: SPEECH THERAPY | Facility: CLINIC | Age: 82
DRG: 391 | End: 2022-08-19
Payer: MEDICARE

## 2022-08-19 ENCOUNTER — APPOINTMENT (OUTPATIENT)
Dept: RADIOLOGY | Facility: CLINIC | Age: 82
DRG: 391 | End: 2022-08-19
Attending: INTERNAL MEDICINE
Payer: MEDICARE

## 2022-08-19 LAB
ANION GAP SERPL CALCULATED.3IONS-SCNC: 9 MMOL/L (ref 5–18)
BUN SERPL-MCNC: 21 MG/DL (ref 8–28)
C COLI+JEJUNI+LARI FUSA STL QL NAA+PROBE: NOT DETECTED
CALCIUM SERPL-MCNC: 8.7 MG/DL (ref 8.5–10.5)
CHLORIDE BLD-SCNC: 108 MMOL/L (ref 98–107)
CO2 SERPL-SCNC: 18 MMOL/L (ref 22–31)
CREAT SERPL-MCNC: 1.53 MG/DL (ref 0.6–1.1)
EC STX1 GENE STL QL NAA+PROBE: NOT DETECTED
EC STX2 GENE STL QL NAA+PROBE: NOT DETECTED
ERYTHROCYTE [DISTWIDTH] IN BLOOD BY AUTOMATED COUNT: 15.8 % (ref 10–15)
GFR SERPL CREATININE-BSD FRML MDRD: 34 ML/MIN/1.73M2
GLUCOSE BLD-MCNC: 88 MG/DL (ref 70–125)
HCT VFR BLD AUTO: 30.8 % (ref 35–47)
HGB BLD-MCNC: 10.2 G/DL (ref 11.7–15.7)
MCH RBC QN AUTO: 31.1 PG (ref 26.5–33)
MCHC RBC AUTO-ENTMCNC: 33.1 G/DL (ref 31.5–36.5)
MCV RBC AUTO: 94 FL (ref 78–100)
NOROV GI+II ORF1-ORF2 JNC STL QL NAA+PR: NOT DETECTED
PLATELET # BLD AUTO: 196 10E3/UL (ref 150–450)
POTASSIUM BLD-SCNC: 3.2 MMOL/L (ref 3.5–5)
POTASSIUM BLD-SCNC: 3.3 MMOL/L (ref 3.5–5)
POTASSIUM BLD-SCNC: 4.1 MMOL/L (ref 3.5–5)
RBC # BLD AUTO: 3.28 10E6/UL (ref 3.8–5.2)
RVA NSP5 STL QL NAA+PROBE: NOT DETECTED
SALMONELLA SP RPOD STL QL NAA+PROBE: NOT DETECTED
SHIGELLA SP+EIEC IPAH STL QL NAA+PROBE: NOT DETECTED
SODIUM SERPL-SCNC: 135 MMOL/L (ref 136–145)
V CHOL+PARA RFBL+TRKH+TNAA STL QL NAA+PR: NOT DETECTED
WBC # BLD AUTO: 3.5 10E3/UL (ref 4–11)
Y ENTERO RECN STL QL NAA+PROBE: NOT DETECTED

## 2022-08-19 PROCEDURE — 84132 ASSAY OF SERUM POTASSIUM: CPT | Performed by: INTERNAL MEDICINE

## 2022-08-19 PROCEDURE — 250N000013 HC RX MED GY IP 250 OP 250 PS 637: Performed by: HOSPITALIST

## 2022-08-19 PROCEDURE — 99226 PR SUBSEQUENT OBSERVATION CARE,LEVEL III: CPT | Performed by: HOSPITALIST

## 2022-08-19 PROCEDURE — 96366 THER/PROPH/DIAG IV INF ADDON: CPT

## 2022-08-19 PROCEDURE — 120N000001 HC R&B MED SURG/OB

## 2022-08-19 PROCEDURE — 96376 TX/PRO/DX INJ SAME DRUG ADON: CPT

## 2022-08-19 PROCEDURE — 36415 COLL VENOUS BLD VENIPUNCTURE: CPT | Performed by: INTERNAL MEDICINE

## 2022-08-19 PROCEDURE — 250N000013 HC RX MED GY IP 250 OP 250 PS 637: Performed by: PHYSICIAN ASSISTANT

## 2022-08-19 PROCEDURE — 96375 TX/PRO/DX INJ NEW DRUG ADDON: CPT

## 2022-08-19 PROCEDURE — G0378 HOSPITAL OBSERVATION PER HR: HCPCS

## 2022-08-19 PROCEDURE — 85027 COMPLETE CBC AUTOMATED: CPT | Performed by: EMERGENCY MEDICINE

## 2022-08-19 PROCEDURE — 250N000013 HC RX MED GY IP 250 OP 250 PS 637: Performed by: EMERGENCY MEDICINE

## 2022-08-19 PROCEDURE — 92611 MOTION FLUOROSCOPY/SWALLOW: CPT | Mod: GN

## 2022-08-19 PROCEDURE — 92526 ORAL FUNCTION THERAPY: CPT | Mod: GN

## 2022-08-19 PROCEDURE — 36415 COLL VENOUS BLD VENIPUNCTURE: CPT | Performed by: EMERGENCY MEDICINE

## 2022-08-19 PROCEDURE — 36415 COLL VENOUS BLD VENIPUNCTURE: CPT | Performed by: HOSPITALIST

## 2022-08-19 PROCEDURE — 87493 C DIFF AMPLIFIED PROBE: CPT | Performed by: HOSPITALIST

## 2022-08-19 PROCEDURE — 87506 IADNA-DNA/RNA PROBE TQ 6-11: CPT | Performed by: EMERGENCY MEDICINE

## 2022-08-19 PROCEDURE — 74220 X-RAY XM ESOPHAGUS 1CNTRST: CPT

## 2022-08-19 PROCEDURE — 250N000011 HC RX IP 250 OP 636: Performed by: EMERGENCY MEDICINE

## 2022-08-19 PROCEDURE — 80048 BASIC METABOLIC PNL TOTAL CA: CPT | Performed by: EMERGENCY MEDICINE

## 2022-08-19 PROCEDURE — 250N000013 HC RX MED GY IP 250 OP 250 PS 637: Performed by: INTERNAL MEDICINE

## 2022-08-19 PROCEDURE — 84132 ASSAY OF SERUM POTASSIUM: CPT | Performed by: HOSPITALIST

## 2022-08-19 PROCEDURE — 74230 X-RAY XM SWLNG FUNCJ C+: CPT

## 2022-08-19 RX ORDER — MAGNESIUM CARB/ALUMINUM HYDROX 105-160MG
296 TABLET,CHEWABLE ORAL ONCE
Status: DISCONTINUED | OUTPATIENT
Start: 2022-08-20 | End: 2022-08-19

## 2022-08-19 RX ORDER — POLYETHYLENE GLYCOL 3350 17 G/17G
238 POWDER, FOR SOLUTION ORAL ONCE
Status: DISCONTINUED | OUTPATIENT
Start: 2022-08-19 | End: 2022-08-19

## 2022-08-19 RX ORDER — POTASSIUM CHLORIDE 1500 MG/1
20 TABLET, EXTENDED RELEASE ORAL ONCE
Status: COMPLETED | OUTPATIENT
Start: 2022-08-19 | End: 2022-08-19

## 2022-08-19 RX ORDER — DIPHENOXYLATE HCL/ATROPINE 2.5-.025MG
2 TABLET ORAL 4 TIMES DAILY
Status: DISCONTINUED | OUTPATIENT
Start: 2022-08-19 | End: 2022-08-24 | Stop reason: HOSPADM

## 2022-08-19 RX ORDER — BISACODYL 5 MG
10 TABLET, DELAYED RELEASE (ENTERIC COATED) ORAL ONCE
Status: DISCONTINUED | OUTPATIENT
Start: 2022-08-19 | End: 2022-08-19

## 2022-08-19 RX ORDER — LIDOCAINE 4 G/G
1 PATCH TOPICAL
Status: DISCONTINUED | OUTPATIENT
Start: 2022-08-19 | End: 2022-08-23

## 2022-08-19 RX ADMIN — ONDANSETRON 4 MG: 2 INJECTION INTRAMUSCULAR; INTRAVENOUS at 06:50

## 2022-08-19 RX ADMIN — DEXTROAMPHETAMINE SACCHARATE, AMPHETAMINE ASPARTATE, DEXTROAMPHETAMINE SULFATE AND AMPHETAMINE SULFATE 20 MG: 2.5; 2.5; 2.5; 2.5 TABLET ORAL at 12:50

## 2022-08-19 RX ADMIN — VENLAFAXINE HYDROCHLORIDE 150 MG: 150 CAPSULE, EXTENDED RELEASE ORAL at 09:01

## 2022-08-19 RX ADMIN — TRAZODONE HYDROCHLORIDE 400 MG: 150 TABLET ORAL at 20:41

## 2022-08-19 RX ADMIN — APIXABAN 5 MG: 5 TABLET, FILM COATED ORAL at 09:01

## 2022-08-19 RX ADMIN — HYDROCODONE BITARTRATE AND ACETAMINOPHEN 1 TABLET: 5; 325 TABLET ORAL at 05:00

## 2022-08-19 RX ADMIN — DIPHENOXYLATE HYDROCHLORIDE AND ATROPINE SULFATE 2 TABLET: 2.5; .025 TABLET ORAL at 17:25

## 2022-08-19 RX ADMIN — ROSUVASTATIN CALCIUM 10 MG: 10 TABLET, FILM COATED ORAL at 20:41

## 2022-08-19 RX ADMIN — METHOCARBAMOL TABLETS 1500 MG: 500 TABLET, COATED ORAL at 20:41

## 2022-08-19 RX ADMIN — FLUTICASONE FUROATE 1 PUFF: 100 POWDER RESPIRATORY (INHALATION) at 09:02

## 2022-08-19 RX ADMIN — LEVOTHYROXINE SODIUM 50 MCG: 0.05 TABLET ORAL at 05:00

## 2022-08-19 RX ADMIN — POTASSIUM CHLORIDE 20 MEQ: 1500 TABLET, EXTENDED RELEASE ORAL at 14:39

## 2022-08-19 RX ADMIN — POTASSIUM CHLORIDE 20 MEQ: 1500 TABLET, EXTENDED RELEASE ORAL at 09:01

## 2022-08-19 RX ADMIN — OLOPATADINE HYDROCHLORIDE 1 DROP: 1.11 SOLUTION/ DROPS OPHTHALMIC at 09:02

## 2022-08-19 RX ADMIN — DOXYCYCLINE 100 MG: 100 INJECTION, POWDER, LYOPHILIZED, FOR SOLUTION INTRAVENOUS at 17:29

## 2022-08-19 RX ADMIN — CEFTRIAXONE 1 G: 1 INJECTION, POWDER, FOR SOLUTION INTRAMUSCULAR; INTRAVENOUS at 04:16

## 2022-08-19 RX ADMIN — ZONISAMIDE 50 MG: 25 CAPSULE ORAL at 20:40

## 2022-08-19 RX ADMIN — DIPHENOXYLATE HYDROCHLORIDE AND ATROPINE SULFATE 2 TABLET: 2.5; .025 TABLET ORAL at 21:24

## 2022-08-19 RX ADMIN — HYDROCODONE BITARTRATE AND ACETAMINOPHEN 1 TABLET: 5; 325 TABLET ORAL at 14:39

## 2022-08-19 RX ADMIN — DOXYCYCLINE 100 MG: 100 INJECTION, POWDER, LYOPHILIZED, FOR SOLUTION INTRAVENOUS at 04:52

## 2022-08-19 RX ADMIN — DIPHENOXYLATE HYDROCHLORIDE AND ATROPINE SULFATE 2 TABLET: 2.5; .025 TABLET ORAL at 06:04

## 2022-08-19 RX ADMIN — ONDANSETRON 4 MG: 2 INJECTION INTRAMUSCULAR; INTRAVENOUS at 21:20

## 2022-08-19 RX ADMIN — ONDANSETRON 4 MG: 2 INJECTION INTRAMUSCULAR; INTRAVENOUS at 12:53

## 2022-08-19 RX ADMIN — ALBUTEROL SULFATE 2 PUFF: 90 AEROSOL, METERED RESPIRATORY (INHALATION) at 12:53

## 2022-08-19 RX ADMIN — OLOPATADINE HYDROCHLORIDE 1 DROP: 1.11 SOLUTION/ DROPS OPHTHALMIC at 20:41

## 2022-08-19 RX ADMIN — METHOCARBAMOL TABLETS 1500 MG: 500 TABLET, COATED ORAL at 09:00

## 2022-08-19 RX ADMIN — HYDROCODONE BITARTRATE AND ACETAMINOPHEN 1 TABLET: 5; 325 TABLET ORAL at 09:12

## 2022-08-19 RX ADMIN — HYDROCODONE BITARTRATE AND ACETAMINOPHEN 1 TABLET: 5; 325 TABLET ORAL at 21:25

## 2022-08-19 RX ADMIN — LIDOCAINE 1 PATCH: 246 PATCH TOPICAL at 17:38

## 2022-08-19 RX ADMIN — DEXTROAMPHETAMINE SACCHARATE, AMPHETAMINE ASPARTATE, DEXTROAMPHETAMINE SULFATE AND AMPHETAMINE SULFATE 20 MG: 2.5; 2.5; 2.5; 2.5 TABLET ORAL at 09:00

## 2022-08-19 RX ADMIN — MONTELUKAST SODIUM 2 G: 4 TABLET, CHEWABLE ORAL at 12:51

## 2022-08-19 RX ADMIN — APIXABAN 5 MG: 5 TABLET, FILM COATED ORAL at 20:41

## 2022-08-19 RX ADMIN — Medication 5000 UNITS: at 09:03

## 2022-08-19 ASSESSMENT — ACTIVITIES OF DAILY LIVING (ADL)
DOING_ERRANDS_INDEPENDENTLY_DIFFICULTY: NO
ADLS_ACUITY_SCORE: 41
WEAR_GLASSES_OR_BLIND: NO
TOILETING_ISSUES: NO
ADLS_ACUITY_SCORE: 41
CHANGE_IN_FUNCTIONAL_STATUS_SINCE_ONSET_OF_CURRENT_ILLNESS/INJURY: NO
ADLS_ACUITY_SCORE: 37
ADLS_ACUITY_SCORE: 22
DRESSING/BATHING_DIFFICULTY: NO
ADLS_ACUITY_SCORE: 41
ADLS_ACUITY_SCORE: 41
WALKING_OR_CLIMBING_STAIRS_DIFFICULTY: NO
FALL_HISTORY_WITHIN_LAST_SIX_MONTHS: NO
CONCENTRATING,_REMEMBERING_OR_MAKING_DECISIONS_DIFFICULTY: NO
ADLS_ACUITY_SCORE: 41
DIFFICULTY_EATING/SWALLOWING: NO
ADLS_ACUITY_SCORE: 22
ADLS_ACUITY_SCORE: 22
ADLS_ACUITY_SCORE: 41
ADLS_ACUITY_SCORE: 41
ADLS_ACUITY_SCORE: 37

## 2022-08-19 NOTE — PLAN OF CARE
PRIMARY DIAGNOSIS: Electrolytes and fluid balance   OUTPATIENT/OBSERVATION GOALS TO BE MET BEFORE DISCHARGE:  ADLs back to baseline: No pt reports  frequent diarrhea     Activity and level of assistance: Standby assist with gait belt, pt refusing walker     Pain status: pt reporting pain, managed with medications     Return to near baseline physical activity: No     Discharge Planner Nurse   Safe discharge environment identified: Yes  Barriers to discharge: Yes procedure tomorrow morning       Entered by: Jeffrey Linda RN 08/18/2022 8:26 PM     Please review provider order for any additional goals.   Nurse to notify provider when observation goals have been met and patient is ready for discharge.

## 2022-08-19 NOTE — PROGRESS NOTES
Video Swallow Study completed. Patient had no aspiration or penetration. Oropharyngeal swallow function is considered near WNL. Tongue base retraction is WNL. Pharyngeal constriction, hyolaryngeal elevation and excursion were all WNL. Epiglottic inversion is complete. Swallow response is timely for her age group.  Mastication is safe and complete.  A mildly prominent cricopharyngeus is noted but does not appear to limit bolus passage or cause stasis. Mild presbyesophagus and reflux both noted on esophagram, see dedicated report. Patient was instructed on safe swallow strategies and verbalizes understanding. Recommend Regular diet and Thin liquids; slow rate of intake, alternate bites and sips.

## 2022-08-19 NOTE — UTILIZATION REVIEW
Admission Status; Secondary Review Determination       Under the authority of the Utilization Management Committee, the utilization review process indicated a secondary review on the above patient. The review outcome is based on review of the medical records, discussions with staff, and applying clinical experience noted on the date of the review.     (x) Inpatient Status Appropriate - This patient's medical care is consistent with medical management for inpatient care and reasonable inpatient medical practice.     RATIONALE FOR DETERMINATION   At the time of admission with the information available to the attending physician more than 2 nights Hospital complex care was anticipated, based on patient risk of adverse outcome if treated as outpatient and complex care required. Inpatient admission is appropriate based on the Medicare guidelines.     SUMMARY:  81-year-old patient with histories of atherosclerosis, CKD stage IV, presented to the ER for evaluation of 8 weeks of diarrhea, she has been seen by cardiology and the dose of her medication antiplatelet was increased and since then she has been having diarrhea despite that the medication has been stopped,  The ER she was found to have finding of hypokalemia and pneumonia.  She is on IV antibiotics for her infection and is felt to need ongoing hospital care, possibly for a few more days given superimposed GI issues.       The information on this document is developed by the utilization review team in order for the business office to ensure compliance. This only denotes the appropriateness of proper admission status and does not reflect the quality of care rendered.   The definitions of Inpatient Status and Observation Status used in making the determination above are those provided in the CMS Coverage Manual, Chapter 1 and Chapter 6, section 70.4.     Sincerely,     Wong Mesa DO, [facility]  Utilization Review  Physician Advisor

## 2022-08-19 NOTE — PROGRESS NOTES
PRIMARY DIAGNOSIS: Electrolytes and fluid balance   OUTPATIENT/OBSERVATION GOALS TO BE MET BEFORE DISCHARGE:  1. ADLs back to baseline: No pt reports  frequent diarrhea      2. Activity and level of assistance: Standby assist with gait belt, pt refusing walker      3. Pain status: pt reporting pain, managed with medications      4. Return to near baseline physical activity: No          Discharge Planner Nurse   Safe discharge environment identified: Yes  Barriers to discharge: Yes procedure tomorrow morning       Entered by: Jeffrey Linda RN 08/18/2022 8:26 PM  Please review provider order for any additional goals.   Nurse to notify provider when observation goals have been met and patient is ready for discharge.

## 2022-08-19 NOTE — PLAN OF CARE
"Goal Outcome Evaluation:       Problem: Diarrhea  Goal: Fluid and Electrolyte Balance  Outcome: Ongoing, Not Progressing  Intervention: Manage Diarrhea  Recent Flowsheet Documentation  Taken 8/19/2022 1300 by Tory Miranda, RN  Medication Review/Management: medications reviewed  Taken 8/19/2022 0915 by Tory Miranda, RN  Medication Review/Management: medications reviewed    Patient still having multiple episodes of diarrhea. Last episode was yellow/tan and liquid. Complaining of abdominal pain on left lower quadrant. Vitals remain stable. Patient feeling \"discouraged.\"    Tory Miranda RN 8/19/2022                    "

## 2022-08-19 NOTE — PROVIDER NOTIFICATION
Text page was sent to the Remote Hospitalist Dr. Silva regarding having breakthrough pain between PRN medications.   Order was placed

## 2022-08-19 NOTE — PLAN OF CARE
Goal Outcome Evaluation:    Pt complains of pain to mid back, tolerable with PRN pain medications, will continue to monitor. Pt requested lidocaine patch for her back. IV antibiotics given. Pt tolerating regular diet. She refused her carvedilol, due to her BP level.
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
## 2022-08-19 NOTE — PLAN OF CARE
Speech Language Therapy Discharge Summary    Reason for therapy discharge:    All goals and outcomes met, no further needs identified.    Progress towards therapy goal(s). See goals on Care Plan in [facility] electronic health record for goal details.  Goals met    Therapy recommendation(s):    No further therapy is recommended.    VFSS 8/19/22. No aspiration or penetration. Mildly prominent cricopharyngeus and concern for reflux. Patient verbalized understanding of safe swallow strategies. Regular/thin diet with slow rate of intake and alternating solids and liquids.

## 2022-08-19 NOTE — PROGRESS NOTES
Hospitalist Progress Note    Assessment/Plan    Active Problems:    Hypokalemia    Diarrhea, unspecified type    Hypotension due to hypovolemia    81-year-old patient with histories of atherosclerosis, CKD stage IV, presented to the ER for evaluation of 8 weeks of diarrhea, she has been seen by cardiology and the dose of her medication antiplatelet was increased and since then she has been having diarrhea despite that the medication has been stopped,  The ER she was found to have finding of hypokalemia and pneumonia      Diarrhea  History of subtotal colectomy,  Enteric panel has been ordered and came back negative,   Lomotil was resumed, patient is states that she was seen by [facility] in the clinic and they could not fit her for colonoscopy and she is asking if she can be evaluated,    Hypokalemia  Secondary to diarrhea, and diuretics, will hold diuretics, will replace potassium,    Study of PE  On chronic anticoagulation,      History of heart disease,  States she had stents and her cardiologist told her to increase the dose of Crestor, recently antiplatelet dose was increased and she started to have diarrhea so it was stopped, she denies any chest pain,        CKD stage3 with history of right nephrectomy  Creatinine at baseline 1.53     Community-acquired pneumonia.   -Start ceftriaxone and doxycycline.  Had a video swallow study done without evidence of aspiration recommended thin liquid diet and total strategies       Barriers to Discharge:  Persistent diarrhea , pending GI eval     Anticipated discharge date/Disposition: TBD     Subjective  Patient was seen this morning, she is very upset that her diarrhea is not improving and she says that she was seen by [facility] recently and they were not able to do an outpatient colonoscopy due to scheduling problem, she was requesting to be evaluated by gastroenterology for this diarrhea    Objective    Vital signs in last 24 hours  Temp:  [98.5  F (36.9  C)-99.2   F (37.3  C)] 98.6  F (37  C)  Pulse:  [70-96] 96  Resp:  [16-18] 16  BP: ()/(51-56) 112/56  SpO2:  [92 %-97 %] 92 % [unfilled] O2 Device: None (Room air)    Weight:   [unfilled] Weight change:     Intake/Output last 3 shifts  I/O last 3 completed shifts:  In: 720 [P.O.:720]  Out: 850 [Urine:350; Stool:500]  Body mass index is 26.22 kg/m .    Physical Exam:  General Appearance: Alert, oriented x3, does not appear in distress.  HEENT: Normocephalic. No scleral icterus, . Mucous membranes moist.  Heart: :Regular rate and rhythm, normal S1 ,S2, No murmurs, no JVD, no pedal edema   Lungs: Clear to auscultation bilaterally. No wheezing or crackles  Abdomen: Soft, non tender, no rebound or rigidity, non distended, bowel sounds present.      Pertinent Labs   Lab Results: personally reviewed.   Recent Labs   Lab 08/19/22 0622 08/18/22  0143 08/17/22 1922   * 133* 134*   CO2 18* 20* 21*   BUN 21 25 29*   ALBUMIN  --   --  4.0   ALKPHOS  --   --  103   ALT  --   --  154*   AST  --   --  91*     Recent Labs   Lab 08/19/22 0622 08/17/22 1922   WBC 3.5* 5.4   HGB 10.2* 12.5   HCT 30.8* 36.0    266     No results for input(s): CKTOTAL, TROPONINI in the last 168 hours.    Invalid input(s): TROPONINT, CKMBINDEX  Invalid input(s): POCGLUFGR  Advanced Care Planning:  Discharge Planning discussed with patient  Total time with this patient is 35 min with 50% of time spent in examining the patient, reviewing records, discussing plan of care and counseling, 50% of time spent in coordination of care.  Care discussed and coordinated with NOAH.      Minor Valverde MD  Internal Medicine Hospitalist  8/19/2022

## 2022-08-19 NOTE — PLAN OF CARE
"PRIMARY DIAGNOSIS: \"GENERIC\" NURSING  OUTPATIENT/OBSERVATION GOALS TO BE MET BEFORE DISCHARGE:  1. ADLs back to baseline: No    2. Activity and level of assistance: Up with standby assistance.    3. Pain status: Improved-controlled with oral pain medications.    4. Return to near baseline physical activity: No     Discharge Planner Nurse   Safe discharge environment identified: Yes  Barriers to discharge: Yes       Entered by: AMIRA LOAIZA RN 08/19/2022 8:07 AM     Please review provider order for any additional goals.   Nurse to notify provider when observation goals have been met and patient is ready for discharge.    Patient had loose brown stool over night. Sent stool sample to lab. PRN Imodium given for loose stools. Patient reported pain to abdomen. PRN NORCO given as ordered. Patient stated the pain was causing her to feel nauseous, RN gave PRN Zofran. Pt also reports dry mucous membranes in the mouth. RN assisted pt to swab mouth with water to help relieve dryness. Pt remained NPO for swallow study & esophagram this morning. Discharge pending clinical improvement.  "

## 2022-08-19 NOTE — PLAN OF CARE
PRIMARY DIAGNOSIS: GASTROENTERITIS    OUTPATIENT/OBSERVATION GOALS TO BE MET BEFORE DISCHARGE  1. Orthostatic performed: N/A    2. Tolerating PO fluid and/or antibiotics (if applicable): Yes    3. Nausea/Vomiting/Diarrhea symptoms improved: No,  frequent and watery    4. Pain status: Improved-controlled with oral pain medications.    5. Return to near baseline physical activity: Yes    Discharge Planner Nurse   Safe discharge environment identified: Yes  Barriers to discharge: Yes; ongoing diarrhea       Entered by: Tory Miranda RN 08/19/2022 1:28 PM     Please review provider order for any additional goals.   Nurse to notify provider when observation goals have been met and patient is ready for discharge.Goal Outcome Evaluation:    Tory Miranda RN 8/19/2022

## 2022-08-20 LAB
ANION GAP SERPL CALCULATED.3IONS-SCNC: 7 MMOL/L (ref 5–18)
BUN SERPL-MCNC: 22 MG/DL (ref 8–28)
C DIFF TOX B STL QL: NEGATIVE
CALCIUM SERPL-MCNC: 9.1 MG/DL (ref 8.5–10.5)
CHLORIDE BLD-SCNC: 115 MMOL/L (ref 98–107)
CO2 SERPL-SCNC: 18 MMOL/L (ref 22–31)
CREAT SERPL-MCNC: 1.37 MG/DL (ref 0.6–1.1)
GFR SERPL CREATININE-BSD FRML MDRD: 39 ML/MIN/1.73M2
GLUCOSE BLD-MCNC: 80 MG/DL (ref 70–125)
POTASSIUM BLD-SCNC: 3.7 MMOL/L (ref 3.5–5)
POTASSIUM BLD-SCNC: 3.7 MMOL/L (ref 3.5–5)
SODIUM SERPL-SCNC: 140 MMOL/L (ref 136–145)

## 2022-08-20 PROCEDURE — 36415 COLL VENOUS BLD VENIPUNCTURE: CPT | Performed by: HOSPITALIST

## 2022-08-20 PROCEDURE — 80048 BASIC METABOLIC PNL TOTAL CA: CPT | Performed by: HOSPITALIST

## 2022-08-20 PROCEDURE — 250N000013 HC RX MED GY IP 250 OP 250 PS 637: Performed by: PHYSICIAN ASSISTANT

## 2022-08-20 PROCEDURE — 120N000001 HC R&B MED SURG/OB

## 2022-08-20 PROCEDURE — 99233 SBSQ HOSP IP/OBS HIGH 50: CPT | Performed by: HOSPITALIST

## 2022-08-20 PROCEDURE — 84132 ASSAY OF SERUM POTASSIUM: CPT | Performed by: HOSPITALIST

## 2022-08-20 PROCEDURE — 250N000013 HC RX MED GY IP 250 OP 250 PS 637: Performed by: EMERGENCY MEDICINE

## 2022-08-20 PROCEDURE — 250N000013 HC RX MED GY IP 250 OP 250 PS 637: Performed by: HOSPITALIST

## 2022-08-20 PROCEDURE — 250N000011 HC RX IP 250 OP 636: Performed by: EMERGENCY MEDICINE

## 2022-08-20 RX ORDER — ONDANSETRON 4 MG/1
4 TABLET, ORALLY DISINTEGRATING ORAL EVERY 6 HOURS PRN
Status: DISCONTINUED | OUTPATIENT
Start: 2022-08-20 | End: 2022-08-24 | Stop reason: HOSPADM

## 2022-08-20 RX ORDER — PROMETHAZINE HYDROCHLORIDE 12.5 MG/1
12.5 TABLET ORAL EVERY 6 HOURS PRN
Status: DISCONTINUED | OUTPATIENT
Start: 2022-08-20 | End: 2022-08-24 | Stop reason: HOSPADM

## 2022-08-20 RX ORDER — TORSEMIDE 5 MG/1
10 TABLET ORAL DAILY
Status: DISCONTINUED | OUTPATIENT
Start: 2022-08-20 | End: 2022-08-24

## 2022-08-20 RX ORDER — DOXYCYCLINE 100 MG/1
100 CAPSULE ORAL EVERY 12 HOURS SCHEDULED
Status: DISCONTINUED | OUTPATIENT
Start: 2022-08-20 | End: 2022-08-24 | Stop reason: HOSPADM

## 2022-08-20 RX ORDER — ONDANSETRON 2 MG/ML
4 INJECTION INTRAMUSCULAR; INTRAVENOUS EVERY 4 HOURS PRN
Status: DISCONTINUED | OUTPATIENT
Start: 2022-08-20 | End: 2022-08-24 | Stop reason: HOSPADM

## 2022-08-20 RX ORDER — DIPHENHYDRAMINE HYDROCHLORIDE AND LIDOCAINE HYDROCHLORIDE AND ALUMINUM HYDROXIDE AND MAGNESIUM HYDRO
10 KIT EVERY 6 HOURS PRN
Status: DISCONTINUED | OUTPATIENT
Start: 2022-08-20 | End: 2022-08-24 | Stop reason: HOSPADM

## 2022-08-20 RX ORDER — ALLOPURINOL 100 MG/1
100 TABLET ORAL 2 TIMES DAILY
Status: DISCONTINUED | OUTPATIENT
Start: 2022-08-20 | End: 2022-08-24 | Stop reason: HOSPADM

## 2022-08-20 RX ADMIN — OLOPATADINE HYDROCHLORIDE 1 DROP: 1.11 SOLUTION/ DROPS OPHTHALMIC at 09:42

## 2022-08-20 RX ADMIN — HYDROCODONE BITARTRATE AND ACETAMINOPHEN 1 TABLET: 5; 325 TABLET ORAL at 15:52

## 2022-08-20 RX ADMIN — LIDOCAINE 1 PATCH: 246 PATCH TOPICAL at 18:56

## 2022-08-20 RX ADMIN — DEXTROAMPHETAMINE SACCHARATE, AMPHETAMINE ASPARTATE, DEXTROAMPHETAMINE SULFATE AND AMPHETAMINE SULFATE 20 MG: 2.5; 2.5; 2.5; 2.5 TABLET ORAL at 09:39

## 2022-08-20 RX ADMIN — ALLOPURINOL 100 MG: 100 TABLET ORAL at 12:21

## 2022-08-20 RX ADMIN — TORSEMIDE 10 MG: 5 TABLET ORAL at 12:21

## 2022-08-20 RX ADMIN — TRAZODONE HYDROCHLORIDE 400 MG: 150 TABLET ORAL at 22:09

## 2022-08-20 RX ADMIN — ALLOPURINOL 100 MG: 100 TABLET ORAL at 20:39

## 2022-08-20 RX ADMIN — FLUTICASONE FUROATE 1 PUFF: 100 POWDER RESPIRATORY (INHALATION) at 07:00

## 2022-08-20 RX ADMIN — ZONISAMIDE 50 MG: 25 CAPSULE ORAL at 20:36

## 2022-08-20 RX ADMIN — BENZOCAINE: 100 GEL TOPICAL at 20:53

## 2022-08-20 RX ADMIN — OLOPATADINE HYDROCHLORIDE 1 DROP: 1.11 SOLUTION/ DROPS OPHTHALMIC at 20:35

## 2022-08-20 RX ADMIN — CEFTRIAXONE 1 G: 1 INJECTION, POWDER, FOR SOLUTION INTRAMUSCULAR; INTRAVENOUS at 03:37

## 2022-08-20 RX ADMIN — HYDROCODONE BITARTRATE AND ACETAMINOPHEN 1 TABLET: 5; 325 TABLET ORAL at 09:58

## 2022-08-20 RX ADMIN — DEXTROAMPHETAMINE SACCHARATE, AMPHETAMINE ASPARTATE, DEXTROAMPHETAMINE SULFATE AND AMPHETAMINE SULFATE 20 MG: 2.5; 2.5; 2.5; 2.5 TABLET ORAL at 15:48

## 2022-08-20 RX ADMIN — METHOCARBAMOL TABLETS 1500 MG: 500 TABLET, COATED ORAL at 09:38

## 2022-08-20 RX ADMIN — APIXABAN 5 MG: 5 TABLET, FILM COATED ORAL at 09:38

## 2022-08-20 RX ADMIN — DIPHENHYDRAMINE HYDROCHLORIDE AND LIDOCAINE HYDROCHLORIDE AND ALUMINUM HYDROXIDE AND MAGNESIUM HYDRO 10 ML: KIT at 12:21

## 2022-08-20 RX ADMIN — DIPHENOXYLATE HYDROCHLORIDE AND ATROPINE SULFATE 2 TABLET: 2.5; .025 TABLET ORAL at 18:56

## 2022-08-20 RX ADMIN — CARVEDILOL 6.25 MG: 6.25 TABLET, FILM COATED ORAL at 09:39

## 2022-08-20 RX ADMIN — DOXYCYCLINE 100 MG: 100 INJECTION, POWDER, LYOPHILIZED, FOR SOLUTION INTRAVENOUS at 05:20

## 2022-08-20 RX ADMIN — DIPHENOXYLATE HYDROCHLORIDE AND ATROPINE SULFATE 2 TABLET: 2.5; .025 TABLET ORAL at 12:38

## 2022-08-20 RX ADMIN — DOXYCYCLINE HYCLATE 100 MG: 100 CAPSULE ORAL at 20:39

## 2022-08-20 RX ADMIN — APIXABAN 5 MG: 5 TABLET, FILM COATED ORAL at 20:36

## 2022-08-20 RX ADMIN — HYDROCODONE BITARTRATE AND ACETAMINOPHEN 1 TABLET: 5; 325 TABLET ORAL at 01:19

## 2022-08-20 RX ADMIN — Medication 5000 UNITS: at 09:57

## 2022-08-20 RX ADMIN — PROMETHAZINE HYDROCHLORIDE 12.5 MG: 12.5 TABLET ORAL at 10:07

## 2022-08-20 RX ADMIN — LEVOTHYROXINE SODIUM 50 MCG: 0.05 TABLET ORAL at 06:43

## 2022-08-20 RX ADMIN — MONTELUKAST SODIUM 2 G: 4 TABLET, CHEWABLE ORAL at 12:38

## 2022-08-20 RX ADMIN — ONDANSETRON 4 MG: 2 INJECTION INTRAMUSCULAR; INTRAVENOUS at 04:48

## 2022-08-20 RX ADMIN — ROSUVASTATIN CALCIUM 10 MG: 10 TABLET, FILM COATED ORAL at 20:39

## 2022-08-20 RX ADMIN — VENLAFAXINE HYDROCHLORIDE 150 MG: 150 CAPSULE, EXTENDED RELEASE ORAL at 09:39

## 2022-08-20 RX ADMIN — DIPHENHYDRAMINE HYDROCHLORIDE AND LIDOCAINE HYDROCHLORIDE AND ALUMINUM HYDROXIDE AND MAGNESIUM HYDRO 10 ML: KIT at 22:17

## 2022-08-20 RX ADMIN — DIPHENOXYLATE HYDROCHLORIDE AND ATROPINE SULFATE 2 TABLET: 2.5; .025 TABLET ORAL at 20:38

## 2022-08-20 RX ADMIN — METHOCARBAMOL TABLETS 1500 MG: 500 TABLET, COATED ORAL at 20:36

## 2022-08-20 RX ADMIN — DIPHENOXYLATE HYDROCHLORIDE AND ATROPINE SULFATE 2 TABLET: 2.5; .025 TABLET ORAL at 09:40

## 2022-08-20 ASSESSMENT — ACTIVITIES OF DAILY LIVING (ADL)
ADLS_ACUITY_SCORE: 22

## 2022-08-20 NOTE — PLAN OF CARE
Goal Outcome Evaluation:    Plan of Care Reviewed With: patient     Overall Patient Progress: no change    Drowsy at the beginning of the shift, but became more alert after sleep, NORCO given x 1 for c/o back/kidney/colon pain which had minimal effect, lidocaine in place, patient refused to have it remove, refused scheduled albuterol, stated wanted albuterol to be PRN, Zofran given x 1 for c/o nausea with minimal effect, intermittent nausea throughout the shift, sarah essential oil and sips of water utilized as well, stand by assist with GB, had one loose tan BM, abd firm and distended, plan is colonoscopy on 08/22

## 2022-08-20 NOTE — PROVIDER NOTIFICATION
Dr Valverde paged: PIV went bad again after being placed by US this afternoon. Could midline be ordered?     1700 call-back: Will switch to PO doxy. Keep current IV for emergency use.

## 2022-08-20 NOTE — PROGRESS NOTES
GI PROGRESS NOTE  8/20/2022  Sandy Spencer  1940  [id number]    Subjective:   Continues to have loose stools but denies any hematochezia or melena. Slight abdominal discomfort, slightly relieved with passing stool. Denies any fever, chills, fatigue, vomiting.     Does state intermittent nausea at times, this is not a new symptom.      Objective:     Blood pressure 113/57, pulse 82, temperature 98.3  F (36.8  C), temperature source Oral, resp. rate 16, weight 67.1 kg (148 lb), SpO2 95 %, not currently breastfeeding.    Body mass index is 26.22 kg/m .  Gen: NAD  GI: Non-distended, BS positive, soft, slight tenderness below umbilicus.     Laboratory:    BMP  Recent Labs   Lab 08/20/22  0541 08/19/22  1839 08/19/22  1309 08/19/22  0622 08/18/22  0143 08/17/22 1922   NA  --   --   --  135* 133* 134*   POTASSIUM 3.7 4.1 3.3* 3.2* 4.0  3.5 3.0*   CHLORIDE  --   --   --  108* 105 101   SRINIVAS  --   --   --  8.7 9.4 10.0   CO2  --   --   --  18* 20* 21*   BUN  --   --   --  21 25 29*   CR  --   --   --  1.53* 1.51* 1.93*   GLC  --   --   --  88 95 103     CBC  Recent Labs   Lab 08/19/22 0622 08/17/22 1922   WBC 3.5* 5.4   RBC 3.28* 3.97   HGB 10.2* 12.5   HCT 30.8* 36.0   MCV 94 91   MCH 31.1 31.5   MCHC 33.1 34.7   RDW 15.8* 15.3*    266     INRNo lab results found in last 7 days.   LFTs  Recent Labs   Lab Test 08/17/22  1922 07/08/22  1154 03/04/22  1221 02/05/22  1241 02/05/22  1159 10/15/21  1519 09/17/21  1730 05/21/21  1512 03/23/21  1046 02/28/20  1003 01/17/20  1110 11/27/19  1322 10/31/19  0940 10/11/19  1229   ALBUMIN 4.0 3.9 3.8  --  4.4   < >  --    < >  --    < > 3.7   < > 3.8 3.9   BILITOTAL 0.5 0.6  --   --  0.9   < >  --    < >  --    < > 0.5   < > 0.5 0.4   * 10 <9  --  15   < >  --    < >  --    < > <9   < > 13 12   AST 91* 21  --   --  22   < >  --    < >  --    < > 15   < > 23 19   PROTEIN  --   --   --  Negative  --   --  Negative  --  30 mg/dL*   < >  --   --   --   --     LIPASE  --   --   --   --   --   --   --   --   --   --  220*  --  37 290*    < > = values in this interval not displayed.     Assessment:   1. Diarrhea  81 year old female with complicated past medical history including prior cholecystectomy, subtotal colectomy (9in of sigmoid colon left) presented with acute worsening diarrhea. Of note, also found to have pneumonia, currently on antibiotics. Removing magnesium and decrease Praluent not helpful. Questions IBD, microscopic colitis, pancreatic insufficiency, all considered.She is unsure if lomotil and colestipol has been helpful but documented stools appear to be less in freqency.   - Colonoscopy scheduled for Monday 8/22/22, plan for full colonoscopy prep on Sunday 8/21/22.   - Cdiff negative   - Enteric stool negative     Plan:   1. Lomotil 2 tablets 4 x daily  2. Colestipol 2g at lunch  3. Document all stools  4. Supportive cares  5. GI will continue to follow, please call with questions and concerns.     Discussed with Dr. Douglas Land, CNP  Thank you for the opportunity to participate in the care of this patient.   Please feel free to call me with any questions or concerns.  Phone number [phone].

## 2022-08-20 NOTE — PROGRESS NOTES
Hospitalist Progress Note    Assessment/Plan    Active Problems:    Hypokalemia    Diarrhea, unspecified type    Hypotension due to hypovolemia  81-year-old patient with past medical history of coronary artery disease CKD stage IV presented to the ER for evaluation of 8 weeks of diarrhea was initially attributed to  Antiplatelet and medication that was increasing the dose diarrhea continues despite stopping the new medication,  further work-up in the ER found to have hypokalemia and pneumonia    Persistent diarrhea  She has history of subtotal colectomy, she is also status postcholecystectomy on colestipol and Lomotil was added,  Stool studies came back negative, C. difficile came back negative,  GI consulted, patient was previously seen in the GI clinic, she was supposed to get colonoscopy, but has not been done yet, due to scheduling  -Per GI team this could be IBD microscopic colitis, pancreatic insufficiency, all in the differentials,  Will arrange for colonoscopy on Monday, continue supportive care, Lomotil 2 tablets 4 times a day and colestipol 2 g at lunch      Hypokalemia  Secondary to diarrhea and use of diuretics,\  I held diuretics since 8/19 for now,  Replacing potassium    History of PE  On Eliquis, resumed      Coronary artery disease  She is following with cardiologist, she was instructed to increase Crestor from 10 mg to 20,  She could not tolerate antiplatelet medication due to diarrhea so it was stopped  She denies any chest pain      CKD stage III  History of right nephrectomy  -Creatinine stable at baseline        Community-acquired pneumonia  On doxycycline and ceftriaxone  Had a video swallow study without evidence of aspiration, speech therapy is following              Barriers to Discharge: Antibiotic for treatment of pneumonia, pending colonoscopy, persistent diarrhea    Anticipated discharge date/Disposition: TBD    Subjective  Patient was seen this morning, she had multiple concerns 1 she  had nausea requesting IV medication for nausea, and to she wants to resume her torsemide home dose she is concerned that if she does not take it her kidneys will get worse, I explained to her that we have been holding it to diarrhea but she insisted on resuming it and she also was told by her nephrologist that it has to be taken every day at the same time along with her other blood pressure medication, she is also concerned about occasional bruising in her lower extremities,    Objective    Vital signs in last 24 hours  Temp:  [97.9  F (36.6  C)-98.6  F (37  C)] 97.9  F (36.6  C)  Pulse:  [81-96] 81  Resp:  [16] 16  BP: (112-118)/(56-58) 118/58  SpO2:  [92 %] 92 % [unfilled] O2 Device: Nasal cannula    Weight:   [unfilled] Weight change:     Intake/Output last 3 shifts  I/O last 3 completed shifts:  In: 360 [P.O.:360]  Out: 900 [Urine:100; Stool:800]  Body mass index is 26.22 kg/m .    Physical Exam:    General Appearance: Alert, oriented x3, does not appear in distress.  HEENT: Normocephalic. No scleral icterus, . Mucous membranes moist.  Heart: :Regular rate and rhythm, normal S1 ,S2, No murmurs, no JVD, no pedal edema   Lungs: Clear to auscultation bilaterally. No wheezing or crackles  Abdomen: Soft, non tender, no rebound or rigidity, non distended, bowel sounds present.  Pertinent Labs   Lab Results: personally reviewed.   Recent Labs   Lab 08/19/22 0622 08/18/22 0143 08/17/22 1922   * 133* 134*   CO2 18* 20* 21*   BUN 21 25 29*   ALBUMIN  --   --  4.0   ALKPHOS  --   --  103   ALT  --   --  154*   AST  --   --  91*     Recent Labs   Lab 08/19/22 0622 08/17/22 1922   WBC 3.5* 5.4   HGB 10.2* 12.5   HCT 30.8* 36.0    266     No results for input(s): CKTOTAL, TROPONINI in the last 168 hours.    Invalid input(s): TROPONINT, CKMBINDEX  Invalid input(s): POCGLUFGR      Advanced Care Planning:  Discharge Planning discussed with patient  Total time with this patient is 35 min with 50% of time spent  in examining the patient, reviewing records, discussing plan of care and counseling, 50% of time spent in coordination of care.  Care discussed and coordinated with RN.      Minor Valverde MD  Internal Medicine Hospitalist  8/20/2022

## 2022-08-20 NOTE — PROVIDER NOTIFICATION
RM 3118 CS    C/o nausea, unable to Zofran, would like something. Orders?     No new orders, oncoming RN is aware.

## 2022-08-21 ENCOUNTER — ANESTHESIA EVENT (OUTPATIENT)
Dept: SURGERY | Facility: CLINIC | Age: 82
DRG: 391 | End: 2022-08-21
Payer: MEDICARE

## 2022-08-21 LAB
ANION GAP SERPL CALCULATED.3IONS-SCNC: 9 MMOL/L (ref 5–18)
BUN SERPL-MCNC: 23 MG/DL (ref 8–28)
CALCIUM SERPL-MCNC: 9.4 MG/DL (ref 8.5–10.5)
CHLORIDE BLD-SCNC: 111 MMOL/L (ref 98–107)
CO2 SERPL-SCNC: 19 MMOL/L (ref 22–31)
CREAT SERPL-MCNC: 1.69 MG/DL (ref 0.6–1.1)
GFR SERPL CREATININE-BSD FRML MDRD: 30 ML/MIN/1.73M2
GLUCOSE BLD-MCNC: 88 MG/DL (ref 70–125)
POTASSIUM BLD-SCNC: 3.8 MMOL/L (ref 3.5–5)
POTASSIUM BLD-SCNC: 3.8 MMOL/L (ref 3.5–5)
SODIUM SERPL-SCNC: 139 MMOL/L (ref 136–145)

## 2022-08-21 PROCEDURE — 250N000013 HC RX MED GY IP 250 OP 250 PS 637: Performed by: PHYSICIAN ASSISTANT

## 2022-08-21 PROCEDURE — 250N000013 HC RX MED GY IP 250 OP 250 PS 637: Performed by: HOSPITALIST

## 2022-08-21 PROCEDURE — 250N000013 HC RX MED GY IP 250 OP 250 PS 637: Performed by: INTERNAL MEDICINE

## 2022-08-21 PROCEDURE — 250N000013 HC RX MED GY IP 250 OP 250 PS 637: Performed by: EMERGENCY MEDICINE

## 2022-08-21 PROCEDURE — 36415 COLL VENOUS BLD VENIPUNCTURE: CPT | Performed by: HOSPITALIST

## 2022-08-21 PROCEDURE — 82310 ASSAY OF CALCIUM: CPT | Performed by: HOSPITALIST

## 2022-08-21 PROCEDURE — 84132 ASSAY OF SERUM POTASSIUM: CPT | Performed by: HOSPITALIST

## 2022-08-21 PROCEDURE — 120N000001 HC R&B MED SURG/OB

## 2022-08-21 PROCEDURE — 250N000011 HC RX IP 250 OP 636: Performed by: HOSPITALIST

## 2022-08-21 PROCEDURE — 99232 SBSQ HOSP IP/OBS MODERATE 35: CPT | Performed by: HOSPITALIST

## 2022-08-21 RX ORDER — LIDOCAINE 4 G/G
1 PATCH TOPICAL
Status: DISCONTINUED | OUTPATIENT
Start: 2022-08-22 | End: 2022-08-23

## 2022-08-21 RX ORDER — POLYETHYLENE GLYCOL 3350 17 G/17G
238 POWDER, FOR SOLUTION ORAL ONCE
Status: COMPLETED | OUTPATIENT
Start: 2022-08-21 | End: 2022-08-21

## 2022-08-21 RX ORDER — BISACODYL 5 MG
TABLET, DELAYED RELEASE (ENTERIC COATED) ORAL
Qty: 4 TABLET | Refills: 0 | Status: SHIPPED | OUTPATIENT
Start: 2022-08-21 | End: 2022-08-31

## 2022-08-21 RX ORDER — LIDOCAINE 40 MG/G
CREAM TOPICAL
Status: DISCONTINUED | OUTPATIENT
Start: 2022-08-21 | End: 2022-08-24 | Stop reason: HOSPADM

## 2022-08-21 RX ORDER — POLYETHYLENE GLYCOL 3350 17 G/17G
POWDER, FOR SOLUTION ORAL
Qty: 238 G | Refills: 0 | Status: SHIPPED | OUTPATIENT
Start: 2022-08-21 | End: 2022-08-31

## 2022-08-21 RX ORDER — CEFTRIAXONE 1 G/1
1 INJECTION, POWDER, FOR SOLUTION INTRAMUSCULAR; INTRAVENOUS EVERY 24 HOURS
Status: DISCONTINUED | OUTPATIENT
Start: 2022-08-21 | End: 2022-08-24 | Stop reason: HOSPADM

## 2022-08-21 RX ADMIN — ZONISAMIDE 50 MG: 25 CAPSULE ORAL at 20:57

## 2022-08-21 RX ADMIN — BENZOCAINE: 100 GEL TOPICAL at 12:22

## 2022-08-21 RX ADMIN — HYDROCODONE BITARTRATE AND ACETAMINOPHEN 1 TABLET: 5; 325 TABLET ORAL at 09:37

## 2022-08-21 RX ADMIN — DOXYCYCLINE HYCLATE 100 MG: 100 CAPSULE ORAL at 20:55

## 2022-08-21 RX ADMIN — ALLOPURINOL 100 MG: 100 TABLET ORAL at 20:55

## 2022-08-21 RX ADMIN — CARVEDILOL 6.25 MG: 6.25 TABLET, FILM COATED ORAL at 20:55

## 2022-08-21 RX ADMIN — DEXTROAMPHETAMINE SACCHARATE, AMPHETAMINE ASPARTATE, DEXTROAMPHETAMINE SULFATE AND AMPHETAMINE SULFATE 20 MG: 2.5; 2.5; 2.5; 2.5 TABLET ORAL at 09:20

## 2022-08-21 RX ADMIN — DIPHENOXYLATE HYDROCHLORIDE AND ATROPINE SULFATE 2 TABLET: 2.5; .025 TABLET ORAL at 09:20

## 2022-08-21 RX ADMIN — DEXTROAMPHETAMINE SACCHARATE, AMPHETAMINE ASPARTATE, DEXTROAMPHETAMINE SULFATE AND AMPHETAMINE SULFATE 20 MG: 2.5; 2.5; 2.5; 2.5 TABLET ORAL at 14:38

## 2022-08-21 RX ADMIN — TRAZODONE HYDROCHLORIDE 400 MG: 150 TABLET ORAL at 20:56

## 2022-08-21 RX ADMIN — OLOPATADINE HYDROCHLORIDE 1 DROP: 1.11 SOLUTION/ DROPS OPHTHALMIC at 09:23

## 2022-08-21 RX ADMIN — DIPHENHYDRAMINE HYDROCHLORIDE AND LIDOCAINE HYDROCHLORIDE AND ALUMINUM HYDROXIDE AND MAGNESIUM HYDRO 10 ML: KIT at 04:40

## 2022-08-21 RX ADMIN — DOXYCYCLINE HYCLATE 100 MG: 100 CAPSULE ORAL at 09:21

## 2022-08-21 RX ADMIN — OLOPATADINE HYDROCHLORIDE 1 DROP: 1.11 SOLUTION/ DROPS OPHTHALMIC at 21:06

## 2022-08-21 RX ADMIN — APIXABAN 5 MG: 5 TABLET, FILM COATED ORAL at 20:55

## 2022-08-21 RX ADMIN — Medication 5000 UNITS: at 09:17

## 2022-08-21 RX ADMIN — CARVEDILOL 6.25 MG: 6.25 TABLET, FILM COATED ORAL at 09:22

## 2022-08-21 RX ADMIN — LEVOTHYROXINE SODIUM 50 MCG: 0.05 TABLET ORAL at 06:54

## 2022-08-21 RX ADMIN — ALLOPURINOL 100 MG: 100 TABLET ORAL at 09:21

## 2022-08-21 RX ADMIN — DIPHENHYDRAMINE HYDROCHLORIDE AND LIDOCAINE HYDROCHLORIDE AND ALUMINUM HYDROXIDE AND MAGNESIUM HYDRO 10 ML: KIT at 09:33

## 2022-08-21 RX ADMIN — APIXABAN 5 MG: 5 TABLET, FILM COATED ORAL at 09:16

## 2022-08-21 RX ADMIN — TORSEMIDE 5 MG: 5 TABLET ORAL at 12:11

## 2022-08-21 RX ADMIN — FLUTICASONE FUROATE 1 PUFF: 100 POWDER RESPIRATORY (INHALATION) at 04:37

## 2022-08-21 RX ADMIN — POLYETHYLENE GLYCOL 3350 238 G: 17 POWDER, FOR SOLUTION ORAL at 13:06

## 2022-08-21 RX ADMIN — LIDOCAINE 1 PATCH: 246 PATCH TOPICAL at 14:38

## 2022-08-21 RX ADMIN — MONTELUKAST SODIUM 2 G: 4 TABLET, CHEWABLE ORAL at 12:11

## 2022-08-21 RX ADMIN — HYDROCODONE BITARTRATE AND ACETAMINOPHEN 1 TABLET: 5; 325 TABLET ORAL at 16:39

## 2022-08-21 RX ADMIN — DIPHENHYDRAMINE HYDROCHLORIDE AND LIDOCAINE HYDROCHLORIDE AND ALUMINUM HYDROXIDE AND MAGNESIUM HYDRO 10 ML: KIT at 21:27

## 2022-08-21 RX ADMIN — VENLAFAXINE HYDROCHLORIDE 150 MG: 150 CAPSULE, EXTENDED RELEASE ORAL at 09:22

## 2022-08-21 RX ADMIN — METHOCARBAMOL TABLETS 1500 MG: 500 TABLET, COATED ORAL at 09:24

## 2022-08-21 RX ADMIN — POLYETHYLENE GLYCOL 3350 238 G: 17 POWDER, FOR SOLUTION ORAL at 17:30

## 2022-08-21 RX ADMIN — ROSUVASTATIN CALCIUM 10 MG: 10 TABLET, FILM COATED ORAL at 20:56

## 2022-08-21 RX ADMIN — TORSEMIDE 10 MG: 5 TABLET ORAL at 09:19

## 2022-08-21 RX ADMIN — METHOCARBAMOL TABLETS 1500 MG: 500 TABLET, COATED ORAL at 20:56

## 2022-08-21 RX ADMIN — HYDROCODONE BITARTRATE AND ACETAMINOPHEN 1 TABLET: 5; 325 TABLET ORAL at 04:40

## 2022-08-21 RX ADMIN — PROMETHAZINE HYDROCHLORIDE 12.5 MG: 12.5 TABLET ORAL at 17:30

## 2022-08-21 ASSESSMENT — ACTIVITIES OF DAILY LIVING (ADL)
ADLS_ACUITY_SCORE: 22

## 2022-08-21 NOTE — PLAN OF CARE
Problem: Fluid Imbalance (Pneumonia)  Goal: Fluid Balance  Outcome: Ongoing, Progressing     Problem: Diarrhea  Goal: Fluid and Electrolyte Balance  Outcome: Ongoing, Progressing  Intervention: Manage Diarrhea     Goal Outcome Evaluation:    A&O x4. Ambulates SBA. 1 loose/watery stool this shift. Nausea improved. Magic mouthwash given for mouth sores. PRN Norco given for pain. Lidocaine patch removed. Cold therapy utilized. Educated provided regarding medication management and administration during hospital stay.  IV SL. Colonoscopy Monday.

## 2022-08-21 NOTE — PROGRESS NOTES
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    Hypothyroidism    Bilateral carotid artery stenosis    Coronary artery disease involving native coronary artery of native heart with angina pectoris (H)    Other chronic pulmonary embolism without acute cor pulmonale (H)    History of posttraumatic stress disorder (PTSD)    Bipolar disorder, unspecified (H)    Hypokalemia    Diarrhea, unspecified type    Hypotension due to hypovolemia    81-year-old patient with past medical history of coronary artery disease CKD stage IV, bilateral carotid stenosis, history of PTSD, presented to the ER for evaluation of 8 weeks of diarrhea was initially attributed to Antiplatelet and medication that dose was increased, diarrhea continues despite stopping the new medication,  further work-up in the ER found to have hypokalemia and pneumonia     Persistent diarrhea  She has history of subtotal colectomy, she is also status postcholecystectomy on colestipol and Lomotil was added,  Stool studies came back negative, C. difficile came back negative, discontinued precautions  GI consulted, patient was previously seen in the GI clinic, she was supposed to get colonoscopy, but has not been done yet, due to scheduling issue  -Per GI team this could be IBD microscopic colitis, pancreatic insufficiency, all in the differentials,  Planning on colonoscopy tomorrow, patient was started on a clear liquid diet today and bowel prep, nurse will clarify with GI if need to hold Lomotil while doing bowel preparation        Hypokalemia improving  Secondary to diarrhea and use of diuretics  Potassium level improved, diuretics resumed,     History of PE  On Eliquis, resumed        Coronary artery disease   no active chest pain,  She is following with cardiologist, she was instructed to increase Crestor from 10 mg to 20,          CKD stage IV  History of  right nephrectomy  -Creatinine stable at baseline we will continue to monitor creatinine patient is worried about her kidney function, I held diuretic for a day and she requested to resume at because her nephrologist told her to take the diuretic at the same time every day   creatinine is 1.69 today  We will continue to monitor kidney function daily        Community-acquired pneumonia  On doxycycline and ceftriaxone  -Doxycycline was switched to oral yesterday  Appears to be clinically improving, we will continue Rocephin for 2 more doses then stop  Had a video swallow study without evidence of aspiration, speech therapy is following                  Barriers to Discharge: Pending colonoscopy,    Anticipated discharge date/Disposition: TBD    Subjective  Patient was seen this morning she feels terrible she thinks the frequency of her diarrhea is slowing down, she feels hungry and asking if she can eat regular diet her daughter at bedside, she understands that she needs to be on a clear liquid diet and she needs to start her bowel prep to get the colonoscopy tomorrow,    Objective    Vital signs in last 24 hours  Temp:  [97.5  F (36.4  C)-98.3  F (36.8  C)] 98.1  F (36.7  C)  Pulse:  [77-89] 79  Resp:  [14-16] 16  BP: ()/(52-59) 106/58  SpO2:  [93 %-98 %] 95 % [unfilled] O2 Device: None (Room air)    Weight:   [unfilled] Weight change:     Intake/Output last 3 shifts  I/O last 3 completed shifts:  In: 700 [P.O.:700]  Out: -   Body mass index is 26.22 kg/m .    Physical Exam:  General Appearance: Alert, oriented x3, does not appear in distress.  HEENT: Normocephalic. No scleral icterus, . Mucous membranes moist.  Heart: :Regular rate and rhythm, normal S1 ,S2, No murmurs, no JVD, no pedal edema   Lungs: Clear to auscultation bilaterally. No wheezing or crackles  Abdomen: Soft, non tender, no rebound or rigidity, non distended, bowel sounds present.      Pertinent Labs   Lab Results: personally reviewed.    Recent Labs   Lab 08/20/22  0541 08/19/22  0622 08/18/22  0143 08/17/22 1922    135* 133* 134*   CO2 18* 18* 20* 21*   BUN 22 21 25 29*   ALBUMIN  --   --   --  4.0   ALKPHOS  --   --   --  103   ALT  --   --   --  154*   AST  --   --   --  91*     Recent Labs   Lab 08/19/22 0622 08/17/22 1922   WBC 3.5* 5.4   HGB 10.2* 12.5   HCT 30.8* 36.0    266     No results for input(s): CKTOTAL, TROPONINI in the last 168 hours.    Invalid input(s): TROPONINT, CKMBINDEX  Invalid input(s): POCGLUFGR      Advanced Care Planning:  Discharge Planning discussed with patient and her daughter at bedside  Total time with this patient is 35 min with 50% of time spent in examining the patient, reviewing records, discussing plan of care and counseling, 50% of time spent in coordination of care.  Care discussed and coordinated with RN,.      Minor Valverde MD  Internal Medicine Hospitalist  8/21/2022

## 2022-08-21 NOTE — PROGRESS NOTES
Care Management Follow Up    Length of Stay (days): 2    Expected Discharge Date: 08/22/2022     Concerns to be Addressed: discharge planning     Patient plan of care discussed at interdisciplinary rounds: Yes    Anticipated Discharge Disposition: Home     Anticipated Discharge Services: None  Anticipated Discharge DME: None    Patient/family educated on Medicare website which has current facility and service quality ratings: no  Education Provided on the Discharge Plan:    Patient/Family in Agreement with the Plan: yes    Referrals Placed by CM/SW:  (Not currently indicated.)  Private pay costs discussed: Not applicable    Additional Information:  1:19 PM  BRYAN spoke with daughter Viola via phone.  She is requesting a list for private duty nursing services for pt.  BRYAN emailed list to her at [email or web address]      ARY Clemons

## 2022-08-21 NOTE — PLAN OF CARE
Problem: Plan of Care - These are the overarching goals to be used throughout the patient stay.    Goal: Plan of Care Review/Shift Note  Description: The Plan of Care Review/Shift note should be completed every shift.  The Outcome Evaluation is a brief statement about your assessment that the patient is improving, declining, or no change.  This information will be displayed automatically on your shift note.  Outcome: Ongoing, Progressing  Flowsheets (Taken 8/21/2022 4145)  Plan of Care Reviewed With: patient  Outcome Evaluation: pt will tolerate bowel prep for colonoscopy tomorrow.  Overall Patient Progress: improving  Pt's pain complaints are only of her chronic issues, lidocaine patches in place and Orgel utilized.  Pt took [place] earlier this morning.  Up with assist of one.  Tolerating Miralax bowel prep, no stools as of this time from prep.        Goal Outcome Evaluation:    Plan of Care Reviewed With: patient     Overall Patient Progress: improving    Outcome Evaluation: pt will tolerate bowel prep for colonoscopy tomorrow.

## 2022-08-21 NOTE — PLAN OF CARE
Problem: Pain Chronic (Persistent) (Comorbidity Management)  Goal: Acceptable Pain Control and Functional Ability  Outcome: Ongoing, Progressing  Intervention: Develop Pain Management Plan  Recent Flowsheet Documentation  Taken 8/20/2022 1600 by Capo Evans RN  Pain Management Interventions:   medication (see MAR)   cold applied   distraction   essential oils   relaxation techniques promoted  Taken 8/20/2022 0958 by Capo Evans, RN  Pain Management Interventions:   medication (see MAR)   cold applied   distraction   essential oils   relaxation techniques promoted  Intervention: Manage Persistent Pain  Recent Flowsheet Documentation  Taken 8/20/2022 1600 by Capo Evans, RN  Medication Review/Management: medications reviewed  Taken 8/20/2022 0900 by Capo Evans RN  Medication Review/Management: medications reviewed     Problem: Fluid Imbalance (Pneumonia)  Goal: Fluid Balance  Outcome: Ongoing, Progressing     Problem: Infection (Pneumonia)  Goal: Resolution of Infection Signs and Symptoms  Outcome: Ongoing, Progressing   Goal Outcome Evaluation:    Plan of Care Reviewed With: patient     Overall Patient Progress: improving         AVSS on RA. A/O. Pain controlled with norco and lido patch. Magic mouthwash added for mouth pain. PO phenergan added for nausea; pt did not complain of nausea after taking. Abx changed to PO. Spiritual consult placed for emotional support. Up with 1-A. Still having some diarrhea; precautions lifted.    Plan: continue to monitor and support POC. Discharge pending clinical improvement.

## 2022-08-21 NOTE — PROGRESS NOTES
GI PROGRESS NOTE  8/21/2022  Sandy ELSIE Spencer  1940  [id number]    Plan:     Chart review completed. Appears to have less frequency to stools with current medication regimen. Colonoscopy planned for tomorrow. Clear liquid diet today. Colon prep ordered, Miralax/gatorade prep.      Please call with further questions and concerns.                                                                        Diya Land CNP  Thank you for the opportunity to participate in the care of this patient.   Please feel free to call me with any questions or concerns.  Phone number [phone].

## 2022-08-22 ENCOUNTER — ANESTHESIA (OUTPATIENT)
Dept: SURGERY | Facility: CLINIC | Age: 82
DRG: 391 | End: 2022-08-22
Payer: MEDICARE

## 2022-08-22 LAB
COLONOSCOPY: NORMAL
HOLD SPECIMEN: NORMAL
POTASSIUM BLD-SCNC: 3.8 MMOL/L (ref 3.5–5)

## 2022-08-22 PROCEDURE — 250N000009 HC RX 250: Performed by: NURSE ANESTHETIST, CERTIFIED REGISTERED

## 2022-08-22 PROCEDURE — 84132 ASSAY OF SERUM POTASSIUM: CPT | Performed by: HOSPITALIST

## 2022-08-22 PROCEDURE — 250N000011 HC RX IP 250 OP 636: Performed by: INTERNAL MEDICINE

## 2022-08-22 PROCEDURE — 36415 COLL VENOUS BLD VENIPUNCTURE: CPT | Performed by: HOSPITALIST

## 2022-08-22 PROCEDURE — 250N000013 HC RX MED GY IP 250 OP 250 PS 637: Performed by: EMERGENCY MEDICINE

## 2022-08-22 PROCEDURE — 120N000001 HC R&B MED SURG/OB

## 2022-08-22 PROCEDURE — 250N000013 HC RX MED GY IP 250 OP 250 PS 637: Performed by: INTERNAL MEDICINE

## 2022-08-22 PROCEDURE — 250N000013 HC RX MED GY IP 250 OP 250 PS 637: Performed by: HOSPITALIST

## 2022-08-22 PROCEDURE — 99232 SBSQ HOSP IP/OBS MODERATE 35: CPT | Performed by: EMERGENCY MEDICINE

## 2022-08-22 PROCEDURE — 272N000001 HC OR GENERAL SUPPLY STERILE: Performed by: INTERNAL MEDICINE

## 2022-08-22 PROCEDURE — 360N000075 HC SURGERY LEVEL 2, PER MIN: Performed by: INTERNAL MEDICINE

## 2022-08-22 PROCEDURE — 88305 TISSUE EXAM BY PATHOLOGIST: CPT | Mod: TC | Performed by: INTERNAL MEDICINE

## 2022-08-22 PROCEDURE — 250N000011 HC RX IP 250 OP 636: Performed by: NURSE ANESTHETIST, CERTIFIED REGISTERED

## 2022-08-22 PROCEDURE — 0DBN8ZX EXCISION OF SIGMOID COLON, VIA NATURAL OR ARTIFICIAL OPENING ENDOSCOPIC, DIAGNOSTIC: ICD-10-PCS | Performed by: INTERNAL MEDICINE

## 2022-08-22 PROCEDURE — 258N000003 HC RX IP 258 OP 636: Performed by: ANESTHESIOLOGY

## 2022-08-22 PROCEDURE — 999N000141 HC STATISTIC PRE-PROCEDURE NURSING ASSESSMENT: Performed by: INTERNAL MEDICINE

## 2022-08-22 PROCEDURE — 370N000017 HC ANESTHESIA TECHNICAL FEE, PER MIN: Performed by: INTERNAL MEDICINE

## 2022-08-22 RX ORDER — LIDOCAINE 40 MG/G
CREAM TOPICAL
Status: DISCONTINUED | OUTPATIENT
Start: 2022-08-22 | End: 2022-08-24

## 2022-08-22 RX ORDER — PROPOFOL 10 MG/ML
INJECTION, EMULSION INTRAVENOUS CONTINUOUS PRN
Status: DISCONTINUED | OUTPATIENT
Start: 2022-08-22 | End: 2022-08-22

## 2022-08-22 RX ORDER — FENTANYL CITRATE 50 UG/ML
25 INJECTION, SOLUTION INTRAMUSCULAR; INTRAVENOUS
Status: DISCONTINUED | OUTPATIENT
Start: 2022-08-22 | End: 2022-08-23

## 2022-08-22 RX ORDER — LIDOCAINE HYDROCHLORIDE 10 MG/ML
INJECTION, SOLUTION INFILTRATION; PERINEURAL PRN
Status: DISCONTINUED | OUTPATIENT
Start: 2022-08-22 | End: 2022-08-22

## 2022-08-22 RX ORDER — FENTANYL CITRATE 50 UG/ML
25 INJECTION, SOLUTION INTRAMUSCULAR; INTRAVENOUS EVERY 5 MIN PRN
Status: DISCONTINUED | OUTPATIENT
Start: 2022-08-22 | End: 2022-08-23

## 2022-08-22 RX ORDER — ONDANSETRON 4 MG/1
4 TABLET, ORALLY DISINTEGRATING ORAL EVERY 30 MIN PRN
Status: DISCONTINUED | OUTPATIENT
Start: 2022-08-22 | End: 2022-08-22 | Stop reason: HOSPADM

## 2022-08-22 RX ORDER — SODIUM CHLORIDE, SODIUM LACTATE, POTASSIUM CHLORIDE, CALCIUM CHLORIDE 600; 310; 30; 20 MG/100ML; MG/100ML; MG/100ML; MG/100ML
INJECTION, SOLUTION INTRAVENOUS CONTINUOUS
Status: DISCONTINUED | OUTPATIENT
Start: 2022-08-22 | End: 2022-08-22

## 2022-08-22 RX ORDER — NYSTATIN 100000/ML
500000 SUSPENSION, ORAL (FINAL DOSE FORM) ORAL 4 TIMES DAILY
Status: DISCONTINUED | OUTPATIENT
Start: 2022-08-22 | End: 2022-08-24 | Stop reason: HOSPADM

## 2022-08-22 RX ORDER — PROPOFOL 10 MG/ML
INJECTION, EMULSION INTRAVENOUS PRN
Status: DISCONTINUED | OUTPATIENT
Start: 2022-08-22 | End: 2022-08-22

## 2022-08-22 RX ORDER — ONDANSETRON 2 MG/ML
4 INJECTION INTRAMUSCULAR; INTRAVENOUS EVERY 30 MIN PRN
Status: DISCONTINUED | OUTPATIENT
Start: 2022-08-22 | End: 2022-08-22 | Stop reason: HOSPADM

## 2022-08-22 RX ADMIN — LIDOCAINE 1 PATCH: 246 PATCH TOPICAL at 17:02

## 2022-08-22 RX ADMIN — TRAZODONE HYDROCHLORIDE 400 MG: 150 TABLET ORAL at 20:11

## 2022-08-22 RX ADMIN — ZONISAMIDE 50 MG: 25 CAPSULE ORAL at 20:11

## 2022-08-22 RX ADMIN — HYDROCODONE BITARTRATE AND ACETAMINOPHEN 1 TABLET: 5; 325 TABLET ORAL at 16:28

## 2022-08-22 RX ADMIN — CEFTRIAXONE 1 G: 1 INJECTION, POWDER, FOR SOLUTION INTRAMUSCULAR; INTRAVENOUS at 16:28

## 2022-08-22 RX ADMIN — OLOPATADINE HYDROCHLORIDE 1 DROP: 1.11 SOLUTION/ DROPS OPHTHALMIC at 20:11

## 2022-08-22 RX ADMIN — ALLOPURINOL 100 MG: 100 TABLET ORAL at 20:11

## 2022-08-22 RX ADMIN — ALLOPURINOL 100 MG: 100 TABLET ORAL at 08:19

## 2022-08-22 RX ADMIN — DOXYCYCLINE HYCLATE 100 MG: 100 CAPSULE ORAL at 08:18

## 2022-08-22 RX ADMIN — VENLAFAXINE HYDROCHLORIDE 150 MG: 150 CAPSULE, EXTENDED RELEASE ORAL at 08:19

## 2022-08-22 RX ADMIN — OLOPATADINE HYDROCHLORIDE 1 DROP: 1.11 SOLUTION/ DROPS OPHTHALMIC at 08:23

## 2022-08-22 RX ADMIN — DEXTROAMPHETAMINE SACCHARATE, AMPHETAMINE ASPARTATE, DEXTROAMPHETAMINE SULFATE AND AMPHETAMINE SULFATE 20 MG: 2.5; 2.5; 2.5; 2.5 TABLET ORAL at 13:53

## 2022-08-22 RX ADMIN — LEVOTHYROXINE SODIUM 50 MCG: 0.05 TABLET ORAL at 06:28

## 2022-08-22 RX ADMIN — PROPOFOL 60 MG: 10 INJECTION, EMULSION INTRAVENOUS at 11:19

## 2022-08-22 RX ADMIN — DOXYCYCLINE HYCLATE 100 MG: 100 CAPSULE ORAL at 20:11

## 2022-08-22 RX ADMIN — LIDOCAINE 1 PATCH: 246 PATCH TOPICAL at 08:15

## 2022-08-22 RX ADMIN — PROPOFOL 150 MCG/KG/MIN: 10 INJECTION, EMULSION INTRAVENOUS at 11:19

## 2022-08-22 RX ADMIN — FLUTICASONE FUROATE 1 PUFF: 100 POWDER RESPIRATORY (INHALATION) at 04:54

## 2022-08-22 RX ADMIN — ONDANSETRON 4 MG: 2 INJECTION INTRAMUSCULAR; INTRAVENOUS at 16:28

## 2022-08-22 RX ADMIN — SODIUM CHLORIDE, POTASSIUM CHLORIDE, SODIUM LACTATE AND CALCIUM CHLORIDE: 600; 310; 30; 20 INJECTION, SOLUTION INTRAVENOUS at 08:20

## 2022-08-22 RX ADMIN — METHOCARBAMOL TABLETS 1500 MG: 500 TABLET, COATED ORAL at 20:10

## 2022-08-22 RX ADMIN — NYSTATIN 500000 UNITS: 100000 SUSPENSION ORAL at 13:53

## 2022-08-22 RX ADMIN — TORSEMIDE 5 MG: 5 TABLET ORAL at 17:01

## 2022-08-22 RX ADMIN — CARVEDILOL 6.25 MG: 6.25 TABLET, FILM COATED ORAL at 17:02

## 2022-08-22 RX ADMIN — ROSUVASTATIN CALCIUM 10 MG: 10 TABLET, FILM COATED ORAL at 20:11

## 2022-08-22 RX ADMIN — LIDOCAINE HYDROCHLORIDE 3 ML: 10 INJECTION, SOLUTION INFILTRATION; PERINEURAL at 11:19

## 2022-08-22 RX ADMIN — Medication 5000 UNITS: at 08:21

## 2022-08-22 RX ADMIN — TORSEMIDE 10 MG: 5 TABLET ORAL at 08:19

## 2022-08-22 RX ADMIN — APIXABAN 5 MG: 5 TABLET, FILM COATED ORAL at 20:11

## 2022-08-22 RX ADMIN — BENZOCAINE: 100 GEL TOPICAL at 08:18

## 2022-08-22 RX ADMIN — DEXTROAMPHETAMINE SACCHARATE, AMPHETAMINE ASPARTATE, DEXTROAMPHETAMINE SULFATE AND AMPHETAMINE SULFATE 20 MG: 2.5; 2.5; 2.5; 2.5 TABLET ORAL at 08:18

## 2022-08-22 RX ADMIN — DIPHENHYDRAMINE HYDROCHLORIDE AND LIDOCAINE HYDROCHLORIDE AND ALUMINUM HYDROXIDE AND MAGNESIUM HYDRO 10 ML: KIT at 05:05

## 2022-08-22 RX ADMIN — NYSTATIN 500000 UNITS: 100000 SUSPENSION ORAL at 19:30

## 2022-08-22 ASSESSMENT — COPD QUESTIONNAIRES
COPD: 1
CAT_SEVERITY: MILD

## 2022-08-22 ASSESSMENT — ACTIVITIES OF DAILY LIVING (ADL)
ADLS_ACUITY_SCORE: 22
ADLS_ACUITY_SCORE: 28
ADLS_ACUITY_SCORE: 22

## 2022-08-22 NOTE — ANESTHESIA POSTPROCEDURE EVALUATION
Patient: Sandy Spencer    Procedure: Procedure(s):  SIGMOIDOSCOPY WITH BIOPSIES       Anesthesia Type:  MAC    Note:  Disposition: Outpatient   Postop Pain Control: Uneventful            Sign Out: Well controlled pain   PONV: No   Neuro/Psych: Uneventful            Sign Out: Acceptable/Baseline neuro status   Airway/Respiratory: Uneventful            Sign Out: Acceptable/Baseline resp. status   CV/Hemodynamics: Uneventful            Sign Out: Acceptable CV status; No obvious hypovolemia; No obvious fluid overload   Other NRE: NONE   DID A NON-ROUTINE EVENT OCCUR? No           Last vitals:  Vitals:    08/22/22 1142 08/22/22 1210 08/22/22 1240   BP: (!) 80/50 (!) 89/47 (!) 145/52   Pulse: 99 98 98   Resp: 14 16 16   Temp: 37.4  C (99.3  F) 37  C (98.6  F) 36.4  C (97.5  F)   SpO2: 95% 95% 95%       Electronically Signed By: Fabio Peñaloza MD  August 22, 2022  2:14 PM
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
## 2022-08-22 NOTE — ANESTHESIA CARE TRANSFER NOTE
Patient: Sandy Spencer    Procedure: Procedure(s):  SIGMOIDOSCOPY WITH BIOPSIES       Diagnosis: Diarrhea, unspecified type [R19.7]  Diagnosis Additional Information: No value filed.    Anesthesia Type:   MAC     Note:  Anesthesia Care Transfer Notewriter  Vitals:  Vitals Value Taken Time   /59    Temp     Pulse     Resp     SpO2 98        Electronically Signed By: LIVIER HUITRON CRNA  August 22, 2022  11:31 AM

## 2022-08-22 NOTE — PROGRESS NOTES
[facility] MEDICINE PROGRESS NOTE      Identification/Summary: 81 year old female admitted on 8/17 for persistent diarrhea.  Pt  Has a history of subtotal colectomy, post cholecystectomy on cholestipol and lomotil.  On admission the diarrhea was thought to be medication related.  It was stopped.     Problem list:    1.  Diarrhea, persistent: complicated history though initial work up for pathogens   Was negative; pending colonoscopy today  2.  History of PE: continue eliquis  3.  CAP: video swallow study negative; afebrile; on oral doxy; discontinue   Rocephin tomorrow after dose  4.  CAD : stable  5.  CKD stage 4; history of right nephrectomy:   6.  Oral thrush; mild on left side of tongue; noticed new today; hold fluticosone   All oral nystatin  7.  Disposition: pending colonoscopy  8.  dvt prevention: on eliquis    Francy Galan MD  [facility]  [facility]  Phone: #[phone]    Interval History/Subjective:  I am burning up all over; in my colon, in my   Kidneys, in my mouth; chart and labs reviewed    Physical Exam/Objective:  Temp:  [97.3  F (36.3  C)-99.1  F (37.3  C)] 98.4  F (36.9  C)  Pulse:  [72-84] 74  Resp:  [16] 16  BP: (104-115)/(56-63) 111/56  SpO2:  [94 %-96 %] 94 %  Body mass index is 26.22 kg/m .  GENERAL:  Alert, appears comfortable, in no acute distress, appears stated age   HEAD:  Normocephalic, without obvious abnormality, atraumatic   EYES:  PERRL, conjunctiva/corneas clear, no scleral icterus, EOM's intact   NOSE: Nares normal, septum midline, mucosa normal, no drainage   THROAT: Mouth with small sores and whitish plaque on left side of tongue;  No exudates; small lesions base of oral mucosa; thrush looking     NECK: Supple, symmetrical, trachea midline   BACK:   Symmetric, no curvature, ROM normal   LUNGS:   Clear to auscultation bilaterally, no rales, rhonchi, or wheezing, symmetric chest rise on inhalation,  respirations unlabored   CHEST WALL:  No tenderness or deformity   HEART:  Regular rate and rhythm, S1 and S2 normal, no murmur, rub, or gallop    ABDOMEN:   Soft, non-tender, bowel sounds active all four quadrants, no masses, no organomegaly, no rebound or guarding   EXTREMITIES: Extremities normal, atraumatic, no cyanosis or edema    SKIN: Dry to touch, no exanthems in the visualized areas   NEURO: Alert, oriented x3, moves all four extremities freely   PSYCH: Cooperative, behavior is appropriate      Data reviewed today: I personally reviewed all new medications, labs, imaging/diagnostics reports over the past 24 hours. Pertinent findings include:    Imaging:   No results found for this or any previous visit (from the past 24 hour(s)).    Labs  Most Recent 3 BMP's:Recent Labs   Lab Test 08/22/22  0727 08/21/22  0430 08/20/22  0541 08/19/22  1309 08/19/22  0622   NA  --  139 140  --  135*   POTASSIUM 3.8 3.8  3.8 3.7  3.7   < > 3.2*   CHLORIDE  --  111* 115*  --  108*   CO2  --  19* 18*  --  18*   BUN  --  23 22  --  21   CR  --  1.69* 1.37*  --  1.53*   ANIONGAP  --  9 7  --  9   SRINIVAS  --  9.4 9.1  --  8.7   GLC  --  88 80  --  88    < > = values in this interval not displayed.       Medications:   Personally Reviewed.  Medications       albuterol  2 puff Inhalation Q6H     allopurinol  100 mg Oral BID     amphetamine-dextroamphetamine  20 mg Oral BID     apixaban ANTICOAGULANT  5 mg Oral BID     carvedilol  6.25 mg Oral BID w/meals     cefTRIAXone  1 g Intravenous Q24H     colestipol  2 g Oral Daily with lunch     [Held by provider] diphenoxylate-atropine  2 tablet Oral 4x Daily     doxycycline hyclate  100 mg Oral Q12H [facility]     fluticasone  1 puff Inhalation Daily     levothyroxine  50 mcg Oral Daily     lidocaine  1 patch Transdermal Q24H     lidocaine  1 patch Transdermal Q24h    And     lidocaine   Transdermal Q8H [facility]     lidocaine   Transdermal Q8H [facility]     methocarbamol  1,500 mg Oral BID      olopatadine  1 drop Both Eyes BID     rosuvastatin  10 mg Oral Daily     sodium chloride (PF)  3 mL Intracatheter Q8H     torsemide  10 mg Oral Daily     torsemide  5 mg Oral Daily     traZODone  400 mg Oral At Bedtime     venlafaxine  150 mg Oral Daily     Vitamin D3  5,000 Units Oral Daily     zonisamide  50 mg Oral At Bedtime

## 2022-08-22 NOTE — PLAN OF CARE
Problem: Plan of Care - These are the overarching goals to be used throughout the patient stay.    Goal: Plan of Care Review/Shift Note  Description: The Plan of Care Review/Shift note should be completed every shift.  The Outcome Evaluation is a brief statement about your assessment that the patient is improving, declining, or no change.  This information will be displayed automatically on your shift note.  Outcome: Ongoing, Progressing  Flowsheets (Taken 8/22/2022 1347)  Plan of Care Reviewed With: patient  Outcome Evaluation: pt will tolerate colonoscopy and anticipate discharge tomorrow.  Overall Patient Progress: improving  Pt tolerated prep and colonoscopy well today.  Will start nystatin orally for mild thrush per MD.  Up with SBA.  Back on a regular diet now.     Goal Outcome Evaluation:    Plan of Care Reviewed With: patient     Overall Patient Progress: improving    Outcome Evaluation: pt will tolerate colonoscopy and anticipate discharge tomorrow.

## 2022-08-22 NOTE — PLAN OF CARE
"Problem: Plan of Care - These are the overarching goals to be used throughout the patient stay.    Goal: Optimal Comfort and Wellbeing  Intervention: Monitor Pain and Promote Comfort  Recent Flowsheet Documentation  Taken 8/21/2022 1732 by Caitlyn Dennis, RN  Pain Management Interventions: cold applied  Taken 8/21/2022 1639 by Caitlyn Dennis, RN  Pain Management Interventions: medication (see MAR)  Taken 8/21/2022 1540 by Caitlyn Dennis, RN  Pain Management Interventions: repositioned     Pt c/o chronic pain, utilizing lidocaine patch in place, PRN norco x 1. Reports ongoing \"dry mouth\" and \"sores\" gave magic mouthwash dose x 1, effectiveness TBD. Continues miralax bowel prep, stooling loose-liquid results noted. Pt is up with SBA - A x 1. Reports intermittent dizziness, nausea; managing with PRN promethazine.  Is K protocol, recheck K lab scheduled for AM draw.  To be NPO at midnight for scheduled AM colonoscopy.                  "

## 2022-08-22 NOTE — PLAN OF CARE
Goal Outcome Evaluation:      NPO during NOC following bowel prep for colonoscopy, several clear watery stool, episode of fecal incontinence in bed due to urgency and consistency of stool, linens changed. Chronic pain, but pt most concerned about mouth discomfort; noted peeling patches in mouth with white spots, redness - message left for providers regarding suspicion of thrush as pt is on inhaled steroid.     Problem: Plan of Care - These are the overarching goals to be used throughout the patient stay.    Goal: Optimal Comfort and Wellbeing  Outcome: Ongoing, Progressing  Intervention: Monitor Pain and Promote Comfort  Recent Flowsheet Documentation  Taken 8/22/2022 0019 by Althea Presley, RN  Pain Management Interventions: (pt has anbesol at bedside) rest     Problem: Diarrhea  Goal: Fluid and Electrolyte Balance  Outcome: Ongoing, Progressing

## 2022-08-23 ENCOUNTER — APPOINTMENT (OUTPATIENT)
Dept: PHYSICAL THERAPY | Facility: CLINIC | Age: 82
DRG: 391 | End: 2022-08-23
Attending: EMERGENCY MEDICINE
Payer: MEDICARE

## 2022-08-23 ENCOUNTER — APPOINTMENT (OUTPATIENT)
Dept: ULTRASOUND IMAGING | Facility: CLINIC | Age: 82
DRG: 391 | End: 2022-08-23
Attending: EMERGENCY MEDICINE
Payer: MEDICARE

## 2022-08-23 LAB
ALBUMIN UR-MCNC: NEGATIVE MG/DL
ANION GAP SERPL CALCULATED.3IONS-SCNC: 7 MMOL/L (ref 5–18)
APPEARANCE UR: CLEAR
BACTERIA #/AREA URNS HPF: ABNORMAL /HPF
BILIRUB UR QL STRIP: NEGATIVE
BUN SERPL-MCNC: 36 MG/DL (ref 8–28)
CALCIUM SERPL-MCNC: 9.6 MG/DL (ref 8.5–10.5)
CHLORIDE BLD-SCNC: 107 MMOL/L (ref 98–107)
CO2 SERPL-SCNC: 22 MMOL/L (ref 22–31)
COLOR UR AUTO: COLORLESS
CREAT SERPL-MCNC: 1.94 MG/DL (ref 0.6–1.1)
GFR SERPL CREATININE-BSD FRML MDRD: 26 ML/MIN/1.73M2
GLUCOSE BLD-MCNC: 90 MG/DL (ref 70–125)
GLUCOSE UR STRIP-MCNC: NEGATIVE MG/DL
GRANULAR CAST: 6 /LPF
HGB UR QL STRIP: NEGATIVE
HYALINE CASTS: 1 /LPF
KETONES UR STRIP-MCNC: NEGATIVE MG/DL
LEUKOCYTE ESTERASE UR QL STRIP: ABNORMAL
MUCOUS THREADS #/AREA URNS LPF: PRESENT /LPF
NITRATE UR QL: NEGATIVE
PATH REPORT.COMMENTS IMP SPEC: NORMAL
PATH REPORT.COMMENTS IMP SPEC: NORMAL
PATH REPORT.FINAL DX SPEC: NORMAL
PATH REPORT.GROSS SPEC: NORMAL
PATH REPORT.MICROSCOPIC SPEC OTHER STN: NORMAL
PATH REPORT.RELEVANT HX SPEC: NORMAL
PH UR STRIP: 5 [PH] (ref 5–7)
PHOTO IMAGE: NORMAL
POTASSIUM BLD-SCNC: 3.4 MMOL/L (ref 3.5–5)
POTASSIUM BLD-SCNC: 3.5 MMOL/L (ref 3.5–5)
RBC URINE: 1 /HPF
SODIUM SERPL-SCNC: 136 MMOL/L (ref 136–145)
SP GR UR STRIP: 1.01 (ref 1–1.03)
SQUAMOUS EPITHELIAL: 1 /HPF
UROBILINOGEN UR STRIP-MCNC: <2 MG/DL
WBC URINE: 3 /HPF

## 2022-08-23 PROCEDURE — 250N000013 HC RX MED GY IP 250 OP 250 PS 637: Performed by: INTERNAL MEDICINE

## 2022-08-23 PROCEDURE — 99232 SBSQ HOSP IP/OBS MODERATE 35: CPT | Performed by: EMERGENCY MEDICINE

## 2022-08-23 PROCEDURE — 99221 1ST HOSP IP/OBS SF/LOW 40: CPT | Mod: 95 | Performed by: PAIN MEDICINE

## 2022-08-23 PROCEDURE — 36415 COLL VENOUS BLD VENIPUNCTURE: CPT | Performed by: INTERNAL MEDICINE

## 2022-08-23 PROCEDURE — 258N000003 HC RX IP 258 OP 636: Performed by: EMERGENCY MEDICINE

## 2022-08-23 PROCEDURE — 250N000011 HC RX IP 250 OP 636: Performed by: INTERNAL MEDICINE

## 2022-08-23 PROCEDURE — 76775 US EXAM ABDO BACK WALL LIM: CPT

## 2022-08-23 PROCEDURE — 250N000011 HC RX IP 250 OP 636: Performed by: EMERGENCY MEDICINE

## 2022-08-23 PROCEDURE — 88305 TISSUE EXAM BY PATHOLOGIST: CPT | Mod: 26 | Performed by: PATHOLOGY

## 2022-08-23 PROCEDURE — 120N000001 HC R&B MED SURG/OB

## 2022-08-23 PROCEDURE — 80048 BASIC METABOLIC PNL TOTAL CA: CPT | Performed by: INTERNAL MEDICINE

## 2022-08-23 PROCEDURE — 999N000127 HC STATISTIC PERIPHERAL IV START W US GUIDANCE

## 2022-08-23 PROCEDURE — 250N000013 HC RX MED GY IP 250 OP 250 PS 637: Performed by: EMERGENCY MEDICINE

## 2022-08-23 PROCEDURE — 97162 PT EVAL MOD COMPLEX 30 MIN: CPT | Mod: GP

## 2022-08-23 PROCEDURE — 84132 ASSAY OF SERUM POTASSIUM: CPT | Performed by: EMERGENCY MEDICINE

## 2022-08-23 PROCEDURE — 36415 COLL VENOUS BLD VENIPUNCTURE: CPT | Performed by: EMERGENCY MEDICINE

## 2022-08-23 PROCEDURE — 250N000013 HC RX MED GY IP 250 OP 250 PS 637: Performed by: PHYSICIAN ASSISTANT

## 2022-08-23 PROCEDURE — 250N000009 HC RX 250: Performed by: PAIN MEDICINE

## 2022-08-23 PROCEDURE — 250N000013 HC RX MED GY IP 250 OP 250 PS 637: Performed by: PAIN MEDICINE

## 2022-08-23 PROCEDURE — 97116 GAIT TRAINING THERAPY: CPT | Mod: GP

## 2022-08-23 PROCEDURE — 81001 URINALYSIS AUTO W/SCOPE: CPT | Performed by: EMERGENCY MEDICINE

## 2022-08-23 RX ORDER — LOPERAMIDE HCL 2 MG
2 CAPSULE ORAL 2 TIMES DAILY PRN
Status: DISCONTINUED | OUTPATIENT
Start: 2022-08-23 | End: 2022-08-24 | Stop reason: HOSPADM

## 2022-08-23 RX ORDER — SIMETHICONE 80 MG
80 TABLET,CHEWABLE ORAL EVERY 6 HOURS PRN
Status: DISCONTINUED | OUTPATIENT
Start: 2022-08-23 | End: 2022-08-24 | Stop reason: HOSPADM

## 2022-08-23 RX ORDER — LIDOCAINE 50 MG/G
OINTMENT TOPICAL 4 TIMES DAILY
Status: DISCONTINUED | OUTPATIENT
Start: 2022-08-23 | End: 2022-08-24 | Stop reason: ALTCHOICE

## 2022-08-23 RX ORDER — POTASSIUM CHLORIDE 7.45 MG/ML
10 INJECTION INTRAVENOUS
Status: DISPENSED | OUTPATIENT
Start: 2022-08-23 | End: 2022-08-23

## 2022-08-23 RX ORDER — ROSUVASTATIN CALCIUM 10 MG/1
20 TABLET, COATED ORAL AT BEDTIME
Status: DISCONTINUED | OUTPATIENT
Start: 2022-08-23 | End: 2022-08-24 | Stop reason: HOSPADM

## 2022-08-23 RX ORDER — POTASSIUM CHLORIDE 20MEQ/15ML
20 LIQUID (ML) ORAL ONCE
Status: COMPLETED | OUTPATIENT
Start: 2022-08-23 | End: 2022-08-23

## 2022-08-23 RX ORDER — MONTELUKAST SODIUM 4 MG/1
3 TABLET, CHEWABLE ORAL
Status: DISCONTINUED | OUTPATIENT
Start: 2022-08-23 | End: 2022-08-24 | Stop reason: HOSPADM

## 2022-08-23 RX ADMIN — APIXABAN 5 MG: 5 TABLET, FILM COATED ORAL at 20:43

## 2022-08-23 RX ADMIN — HYDROCODONE BITARTRATE AND ACETAMINOPHEN 1 TABLET: 5; 325 TABLET ORAL at 21:55

## 2022-08-23 RX ADMIN — CARVEDILOL 6.25 MG: 6.25 TABLET, FILM COATED ORAL at 18:41

## 2022-08-23 RX ADMIN — TRAZODONE HYDROCHLORIDE 400 MG: 150 TABLET ORAL at 20:34

## 2022-08-23 RX ADMIN — ALLOPURINOL 100 MG: 100 TABLET ORAL at 20:42

## 2022-08-23 RX ADMIN — ONDANSETRON 4 MG: 4 TABLET, ORALLY DISINTEGRATING ORAL at 21:55

## 2022-08-23 RX ADMIN — DIPHENOXYLATE HYDROCHLORIDE AND ATROPINE SULFATE 2 TABLET: 2.5; .025 TABLET ORAL at 16:40

## 2022-08-23 RX ADMIN — VENLAFAXINE HYDROCHLORIDE 150 MG: 150 CAPSULE, EXTENDED RELEASE ORAL at 08:48

## 2022-08-23 RX ADMIN — METHOCARBAMOL TABLETS 1500 MG: 500 TABLET, COATED ORAL at 08:48

## 2022-08-23 RX ADMIN — TORSEMIDE 5 MG: 5 TABLET ORAL at 12:39

## 2022-08-23 RX ADMIN — OLOPATADINE HYDROCHLORIDE 1 DROP: 1.11 SOLUTION/ DROPS OPHTHALMIC at 20:46

## 2022-08-23 RX ADMIN — ONDANSETRON 4 MG: 4 TABLET, ORALLY DISINTEGRATING ORAL at 16:39

## 2022-08-23 RX ADMIN — NYSTATIN 500000 UNITS: 100000 SUSPENSION ORAL at 12:37

## 2022-08-23 RX ADMIN — ROSUVASTATIN CALCIUM 20 MG: 10 TABLET, FILM COATED ORAL at 20:34

## 2022-08-23 RX ADMIN — SODIUM CHLORIDE, POTASSIUM CHLORIDE, SODIUM LACTATE AND CALCIUM CHLORIDE 1000 ML: 600; 310; 30; 20 INJECTION, SOLUTION INTRAVENOUS at 10:45

## 2022-08-23 RX ADMIN — DIPHENOXYLATE HYDROCHLORIDE AND ATROPINE SULFATE 2 TABLET: 2.5; .025 TABLET ORAL at 12:38

## 2022-08-23 RX ADMIN — HYDROCODONE BITARTRATE AND ACETAMINOPHEN 1 TABLET: 5; 325 TABLET ORAL at 09:11

## 2022-08-23 RX ADMIN — SIMETHICONE 80 MG: 80 TABLET, CHEWABLE ORAL at 10:01

## 2022-08-23 RX ADMIN — CARVEDILOL 6.25 MG: 6.25 TABLET, FILM COATED ORAL at 08:49

## 2022-08-23 RX ADMIN — NYSTATIN 500000 UNITS: 100000 SUSPENSION ORAL at 20:47

## 2022-08-23 RX ADMIN — HYDROCODONE BITARTRATE AND ACETAMINOPHEN 1 TABLET: 5; 325 TABLET ORAL at 00:39

## 2022-08-23 RX ADMIN — LEVOTHYROXINE SODIUM 50 MCG: 0.05 TABLET ORAL at 05:19

## 2022-08-23 RX ADMIN — DIPHENOXYLATE HYDROCHLORIDE AND ATROPINE SULFATE 2 TABLET: 2.5; .025 TABLET ORAL at 20:34

## 2022-08-23 RX ADMIN — MONTELUKAST SODIUM 3 G: 4 TABLET, CHEWABLE ORAL at 12:40

## 2022-08-23 RX ADMIN — DEXTROAMPHETAMINE SACCHARATE, AMPHETAMINE ASPARTATE, DEXTROAMPHETAMINE SULFATE AND AMPHETAMINE SULFATE 20 MG: 2.5; 2.5; 2.5; 2.5 TABLET ORAL at 08:49

## 2022-08-23 RX ADMIN — NYSTATIN 500000 UNITS: 100000 SUSPENSION ORAL at 08:48

## 2022-08-23 RX ADMIN — DOXYCYCLINE HYCLATE 100 MG: 100 CAPSULE ORAL at 08:54

## 2022-08-23 RX ADMIN — LIDOCAINE: 50 OINTMENT TOPICAL at 20:51

## 2022-08-23 RX ADMIN — HYDROCODONE BITARTRATE AND ACETAMINOPHEN 1 TABLET: 5; 325 TABLET ORAL at 16:39

## 2022-08-23 RX ADMIN — POTASSIUM CHLORIDE 20 MEQ: 20 SOLUTION ORAL at 10:53

## 2022-08-23 RX ADMIN — CEFTRIAXONE 1 G: 1 INJECTION, POWDER, FOR SOLUTION INTRAMUSCULAR; INTRAVENOUS at 16:41

## 2022-08-23 RX ADMIN — LIDOCAINE: 50 OINTMENT TOPICAL at 16:41

## 2022-08-23 RX ADMIN — NYSTATIN 500000 UNITS: 100000 SUSPENSION ORAL at 16:41

## 2022-08-23 RX ADMIN — DOXYCYCLINE HYCLATE 100 MG: 100 CAPSULE ORAL at 20:35

## 2022-08-23 RX ADMIN — METHOCARBAMOL TABLETS 1500 MG: 500 TABLET, COATED ORAL at 20:33

## 2022-08-23 RX ADMIN — ZONISAMIDE 50 MG: 25 CAPSULE ORAL at 20:35

## 2022-08-23 RX ADMIN — ONDANSETRON 4 MG: 2 INJECTION INTRAMUSCULAR; INTRAVENOUS at 00:39

## 2022-08-23 RX ADMIN — TORSEMIDE 10 MG: 5 TABLET ORAL at 08:52

## 2022-08-23 RX ADMIN — ALLOPURINOL 100 MG: 100 TABLET ORAL at 08:55

## 2022-08-23 RX ADMIN — Medication 5000 UNITS: at 08:52

## 2022-08-23 RX ADMIN — OLOPATADINE HYDROCHLORIDE 1 DROP: 1.11 SOLUTION/ DROPS OPHTHALMIC at 08:53

## 2022-08-23 RX ADMIN — APIXABAN 5 MG: 5 TABLET, FILM COATED ORAL at 08:48

## 2022-08-23 ASSESSMENT — ACTIVITIES OF DAILY LIVING (ADL)
ADLS_ACUITY_SCORE: 22
ADLS_ACUITY_SCORE: 23
ADLS_ACUITY_SCORE: 23
ADLS_ACUITY_SCORE: 22
ADLS_ACUITY_SCORE: 23
ADLS_ACUITY_SCORE: 22
ADLS_ACUITY_SCORE: 23

## 2022-08-23 NOTE — CONSULTS
"ACUPUNCTURIST TREATMENT NOTE    Name: Sandy Spencer  :  1940  MRN:  [id number]    Acupuncture Treatment  Patient Type: Medical  Intervention Reason: Pain  Pain Location: abdomen  Pre-session Pain Ratin  Post-session Pain Ratin  Patient complaint:: abdominal pain  Initial insertions: Yin Miranda, LI 4, ST 36, Karina 3, Sp 4  Treatment Observations: Pt. reported feeling very relaxed following treatment session.         \"Risks and benefits of acupuncture were discussed with patient. Consent for treatment was given. We thank you for the referral.\"     Debra Hernández L.Ac.     Date:  2022  Time:  4:13 PM    "

## 2022-08-23 NOTE — PLAN OF CARE
Goal Outcome Evaluation:    Plan of Care Reviewed With: patient     Patient c/o kidney pain. Pain team consulted. Kidney ultrasound complete. Bm x 1 today partially formed.

## 2022-08-23 NOTE — PLAN OF CARE
Goal Outcome Evaluation:    Plan of Care Reviewed With: patient     Overall Patient Progress: improving    Alert and oriented x 4, able to make needs known, c/o generalized pain, gave PRN NORCO x1 for no relief, but stated comfortable and refused other interventions, c/o nausea after ambulating to the bathroom, gave Zofran x 1 for good relief, had medium loose brown BM x 1, slept comfortably most of the night, plan is home today.

## 2022-08-23 NOTE — PHARMACY-CONSULT NOTE
Pharmacist to Provider:  Carvedilol results in diarrhea in 2-12% of patients  Trazodone result in diarrhea in 9% of patiens  Venlafaxine results in diarrhea in 8% of patients  Adderall results in diarrhea in 6% of patients  Zonisamide results in diarrhea in 5% of patients  Torsemide results in diarrhea in 2% of patients  Allopurinol results in diarrhea in 1% of patients.  Thank you, Lona Flores [facility] 8/23/2022 11:27 AM

## 2022-08-23 NOTE — PROGRESS NOTES
Had one loose not watery green BM, c/o kidney pain, gave PRN NORCO x1 with no relief, applied ice to area with some relief, paged MD about creatinine increasing and kidney pain, 1000 ml bolus was started, on K protocol, potassium replaced, recheck in 4 hours.

## 2022-08-23 NOTE — PROGRESS NOTES
[facility] Progress Note  I met with pt this afternoon. She shared that she is doing okay at this time, better than previous days. No other needs at this time.       Param Ortiz  Associate

## 2022-08-23 NOTE — TELEPHONE ENCOUNTER
Spoke with patient. Confirmed had received prior vmm. Will increase crestor to 20 mg. Pt indicated at hospital currently. But on a zoom call with the pain clinic and had to disconnect call.        Admission due to diarrhea, slp eval, pneumonia and hypokalemia.     Will forward to provider to review upon his return.

## 2022-08-23 NOTE — DISCHARGE INSTRUCTIONS
Caitlyn Rees MD    General - Family Medicine    [phone]   It is recommended to follow up with your primary care provider in 7 days        Home Care has been arranged with:  [facility]  [phone]  They will contact you to arrange initial visit

## 2022-08-23 NOTE — PROGRESS NOTES
[facility] MEDICINE PROGRESS NOTE      Identification/Summary: 81 year old female admitted on 8/17 for persistent diarrhea.  Pt  Has a history of subtotal colectomy, post cholecystectomy on cholestipol and lomotil.  On admission the diarrhea was thought to be medication related.  It was stopped.     Problem list:    1.  Diarrhea, persistent: colonoscopy completed yesterday; GI input   Appreciated; will restart lomtil; biopsies unrevealing; medications   Reviewed from pharmacy as possible contributors; colestipol    Dose being adjusted  2.  History of PE: continue eliquis  3.  CAP: video swallow study negative; afebrile; on oral doxy; discontinue   Rocephin today after last dose  4.  Acute on chronic renal disease: (history of stage 4 renal with nephrectomy)   Volume challenge today with 1 liter LR, UA pending  5.  Oral thrush; mild on left side of tongue; noticed new today; hold fluticosone   All oral nystatin  6.  Left flank pain: pt states her urine is burning; urinalysis pending;  Renal ultrasound  7.  Chronic pain: pt is seen outpatient by the pain team; I discussed her case on   Phone with pain team; hold adderall; continue with dosing as is for now  8.  CAD history with difficult to control lipids: pt with new septal infarct on admitting   EKG (new since 7/19/2022); just had a cardiac MRI in July with EF of 60%;   Discussed need to optimize risk factors including cholesterol; she follows   Closely with Dr Lang  9.  Disposition: home tomorrow; needs PT eval today  10..  dvt prevention: on eliquis    Francy Galan MD  [facility]  [facility]  Phone: #[phone]    Interval History/Subjective:  I am burning all over; I am light headed when   I get up; denies headache, dizziness  Physical Exam/Objective:  Temp:  [97.5  F (36.4  C)-98.7  F (37.1  C)] 97.9  F (36.6  C)  Pulse:  [75-98] 78  Resp:  [16-18] 18  BP: ()/(47-63)  103/63  SpO2:  [91 %-96 %] 96 %  Body mass index is 26.22 kg/m .  GENERAL:  Alert, complains of BURNING ALL OVER, my mouth, my back, my neck, my abdomen   HEAD:  Normocephalic, without obvious abnormality, atraumatic   EYES:  PERRL, conjunctiva/corneas clear, no scleral icterus, EOM's intact   NOSE: Nares normal, septum midline, mucosa normal, no drainage   THROAT: Mouth with small sores and whitish plaque on left side of tongue;  No exudates; small lesions base of oral mucosa; thrush looking     NECK: Supple, symmetrical, trachea midline   BACK:   Symmetric, no curvature, ROM normal   LUNGS:   Clear to auscultation bilaterally, no rales, rhonchi, or wheezing, symmetric chest rise on inhalation, respirations unlabored   CHEST WALL:  No tenderness or deformity   HEART:  Regular rate and rhythm, S1 and S2 normal, no murmur, rub, or gallop    ABDOMEN:   Soft, non-tender, bowel sounds active all four quadrants, no masses, no organomegaly, no rebound or guarding   EXTREMITIES: Extremities normal, atraumatic, no cyanosis or edema    SKIN: Dry to touch, no exanthems in the visualized areas   NEURO: Alert, oriented x3, moves all four extremities freely   PSYCH: Cooperative, ++ anxious, seems angry and mistrusting     Data reviewed today: I personally reviewed all new medications, labs, imaging/diagnostics reports over the past 24 hours. Pertinent findings include:    Imaging:   No results found for this or any previous visit (from the past 24 hour(s)).    Labs  Most Recent 3 BMP's:  Recent Labs   Lab Test 08/23/22  0846 08/22/22  0727 08/21/22  0430 08/20/22  0541     --  139 140   POTASSIUM 3.4* 3.8 3.8  3.8 3.7  3.7   CHLORIDE 107  --  111* 115*   CO2 22  --  19* 18*   BUN 36*  --  23 22   CR 1.94*  --  1.69* 1.37*   ANIONGAP 7  --  9 7   SRINIVAS 9.6  --  9.4 9.1   GLC 90  --  88 80       Medications:   Personally Reviewed.  Medications       albuterol  2 puff Inhalation Q6H     allopurinol  100 mg Oral BID      amphetamine-dextroamphetamine  20 mg Oral BID     apixaban ANTICOAGULANT  5 mg Oral BID     carvedilol  6.25 mg Oral BID w/meals     cefTRIAXone  1 g Intravenous Q24H     colestipol  3 g Oral Daily with lunch     diphenoxylate-atropine  2 tablet Oral 4x Daily     doxycycline hyclate  100 mg Oral Q12H [facility] (08/20)     [Held by provider] fluticasone  1 puff Inhalation Daily     lactated ringers  1,000 mL Intravenous Once     levothyroxine  50 mcg Oral Daily     lidocaine  1 patch Transdermal Q24H     lidocaine  1 patch Transdermal Q24h    And     lidocaine   Transdermal Q8H VIKTORIA     lidocaine   Transdermal Q8H [facility]     methocarbamol  1,500 mg Oral BID     nystatin  500,000 Units Swish & Swallow 4x Daily     olopatadine  1 drop Both Eyes BID     potassium chloride  10 mEq Intravenous Q1H     rosuvastatin  20 mg Oral Daily     sodium chloride (PF)  3 mL Intracatheter Q8H     torsemide  10 mg Oral Daily     torsemide  5 mg Oral Daily     traZODone  400 mg Oral At Bedtime     venlafaxine  150 mg Oral Daily     Vitamin D3  5,000 Units Oral Daily     zonisamide  50 mg Oral At Bedtime

## 2022-08-23 NOTE — PLAN OF CARE
Problem: Plan of Care - These are the overarching goals to be used throughout the patient stay.    Goal: Readiness for Transition of Care  Outcome: Ongoing, Progressing     Patient c/o chronic generalized pain - PRN norco given x 1.  Pt still c/o nausea - PRN zofran given.  IV & PO ABX given for pneumonia. Nystatin for thrush.  VSS. Tolerating regular diet. Pt still states she is having loose stools.  Pt reluctant to discharge home.  Alarms on for safety - pt using call light appropriately..
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
## 2022-08-23 NOTE — PROGRESS NOTES
08/23/22 1330   Quick Adds   Type of Visit Initial PT Evaluation   Living Environment   People in Home alone   Current Living Arrangements condominium   Home Accessibility no concerns   Living Environment Comments Patient reports no one is able to stay with patient at home. Patient reports 2 daughters live in the [facility] and patient has friends assisting with cat at home   Self-Care   Equipment Currently Used at Home none   Fall history within last six months no   Activity/Exercise/Self-Care Comment patient reports having access to 4WW at home   General Information   Onset of Illness/Injury or Date of Surgery 08/17/22   Referring Physician Dr. Francy Galan   Patient/Family Therapy Goals Statement (PT) Go Home   Pertinent History of Current Problem (include personal factors and/or comorbidities that impact the POC) Diarrhea, lightheaded   Weight-Bearing Status - LLE full weight-bearing   Weight-Bearing Status - RLE full weight-bearing   Bed Mobility   Bed Mobility supine-sit   Supine-Sit [place] (Bed Mobility) independent   Transfers   Transfers sit-stand transfer   Maintains Weight-bearing Status (Transfers) able to maintain   Sit-Stand Transfer   Sit-Stand [place] (Transfers) verbal cues;supervision   Assistive Device (Sit-Stand Transfers)   (None)   Gait/Stairs (Locomotion)   [place] Level (Gait) contact guard;verbal cues   Assistive Device (Gait)   (None)   Distance in Feet (Required for LE Total Joints) 10x2, 225   Pattern (Gait) step-through   Deviations/Abnormal Patterns (Gait) gait speed decreased;yojana decreased   Maintains Weight-bearing Status (Gait) able to maintain   Clinical Impression   Criteria for Skilled Therapeutic Intervention Yes, treatment indicated   PT Diagnosis (PT) Impaired functional mobility   Influenced by the following impairments weakness, decreased endurance   Functional limitations due to impairments transfers, gait   Clinical Presentation (PT Evaluation  Complexity) Evolving/Changing   Clinical Presentation Rationale Pt presents as medically diagnosed   Clinical Decision Making (Complexity) moderate complexity   Planned Therapy Interventions (PT) gait training;home exercise program;transfer training;strengthening;patient/family education;balance training   Anticipated Equipment Needs at Discharge (PT)   (None)   Risk & Benefits of therapy have been explained evaluation/treatment results reviewed;care plan/treatment goals reviewed;patient   PT Discharge Planning   PT Discharge Recommendation (DC Rec) (S)  home with home care physical therapy   PT Rationale for DC Rec Patient ambulating safely with no AD, no stairs at home   Total Evaluation Time   Total Evaluation Time (Minutes) 10   Physical Therapy Goals   PT Frequency Daily   PT Predicted Duration/Target Date for Goal Attainment 08/30/22   PT Goals Transfers;Gait   PT: Transfers Independent;Sit to/from stand   PT: Gait Independent;150 feet

## 2022-08-24 ENCOUNTER — TELEPHONE (OUTPATIENT)
Dept: FAMILY MEDICINE | Facility: CLINIC | Age: 82
End: 2022-08-24

## 2022-08-24 VITALS
RESPIRATION RATE: 18 BRPM | OXYGEN SATURATION: 95 % | BODY MASS INDEX: 26.22 KG/M2 | SYSTOLIC BLOOD PRESSURE: 95 MMHG | TEMPERATURE: 99.4 F | WEIGHT: 148 LBS | HEART RATE: 80 BPM | DIASTOLIC BLOOD PRESSURE: 51 MMHG

## 2022-08-24 LAB
ANION GAP SERPL CALCULATED.3IONS-SCNC: 8 MMOL/L (ref 5–18)
BUN SERPL-MCNC: 41 MG/DL (ref 8–28)
CALCIUM SERPL-MCNC: 9.3 MG/DL (ref 8.5–10.5)
CHLORIDE BLD-SCNC: 108 MMOL/L (ref 98–107)
CO2 SERPL-SCNC: 21 MMOL/L (ref 22–31)
CREAT SERPL-MCNC: 1.93 MG/DL (ref 0.6–1.1)
GFR SERPL CREATININE-BSD FRML MDRD: 26 ML/MIN/1.73M2
GLUCOSE BLD-MCNC: 86 MG/DL (ref 70–125)
POTASSIUM BLD-SCNC: 3.2 MMOL/L (ref 3.5–5)
POTASSIUM BLD-SCNC: 3.3 MMOL/L (ref 3.5–5)
SODIUM SERPL-SCNC: 137 MMOL/L (ref 136–145)

## 2022-08-24 PROCEDURE — 250N000013 HC RX MED GY IP 250 OP 250 PS 637: Performed by: PHYSICIAN ASSISTANT

## 2022-08-24 PROCEDURE — 250N000013 HC RX MED GY IP 250 OP 250 PS 637: Performed by: INTERNAL MEDICINE

## 2022-08-24 PROCEDURE — 36415 COLL VENOUS BLD VENIPUNCTURE: CPT | Performed by: EMERGENCY MEDICINE

## 2022-08-24 PROCEDURE — 84132 ASSAY OF SERUM POTASSIUM: CPT | Performed by: EMERGENCY MEDICINE

## 2022-08-24 PROCEDURE — 82374 ASSAY BLOOD CARBON DIOXIDE: CPT | Performed by: FAMILY MEDICINE

## 2022-08-24 PROCEDURE — 250N000011 HC RX IP 250 OP 636: Performed by: INTERNAL MEDICINE

## 2022-08-24 PROCEDURE — 99239 HOSP IP/OBS DSCHRG MGMT >30: CPT | Performed by: FAMILY MEDICINE

## 2022-08-24 PROCEDURE — 250N000013 HC RX MED GY IP 250 OP 250 PS 637: Performed by: EMERGENCY MEDICINE

## 2022-08-24 PROCEDURE — 82310 ASSAY OF CALCIUM: CPT | Performed by: FAMILY MEDICINE

## 2022-08-24 PROCEDURE — 99232 SBSQ HOSP IP/OBS MODERATE 35: CPT | Performed by: NURSE PRACTITIONER

## 2022-08-24 PROCEDURE — 36415 COLL VENOUS BLD VENIPUNCTURE: CPT | Performed by: FAMILY MEDICINE

## 2022-08-24 RX ORDER — POTASSIUM CHLORIDE 1500 MG/1
20 TABLET, EXTENDED RELEASE ORAL ONCE
Status: COMPLETED | OUTPATIENT
Start: 2022-08-24 | End: 2022-08-24

## 2022-08-24 RX ORDER — TORSEMIDE 5 MG/1
5 TABLET ORAL
Status: DISCONTINUED | OUTPATIENT
Start: 2022-08-24 | End: 2022-08-24 | Stop reason: HOSPADM

## 2022-08-24 RX ORDER — ROSUVASTATIN CALCIUM 20 MG/1
20 TABLET, COATED ORAL AT BEDTIME
COMMUNITY
Start: 2022-08-24 | End: 2022-08-31

## 2022-08-24 RX ORDER — LIDOCAINE 4 G/G
3 PATCH TOPICAL EVERY 24 HOURS
Qty: 90 PATCH | Refills: 0 | Status: SHIPPED | OUTPATIENT
Start: 2022-08-24 | End: 2022-09-23

## 2022-08-24 RX ORDER — DOXYCYCLINE 100 MG/1
100 CAPSULE ORAL EVERY 12 HOURS
Qty: 14 CAPSULE | Refills: 0 | Status: SHIPPED | OUTPATIENT
Start: 2022-08-24 | End: 2022-08-31

## 2022-08-24 RX ORDER — SIMETHICONE 80 MG
80 TABLET,CHEWABLE ORAL EVERY 6 HOURS PRN
Qty: 30 TABLET | Refills: 0 | Status: SHIPPED | OUTPATIENT
Start: 2022-08-24 | End: 2022-09-21

## 2022-08-24 RX ORDER — LIDOCAINE 4 G/G
3 PATCH TOPICAL
Status: DISCONTINUED | OUTPATIENT
Start: 2022-08-24 | End: 2022-08-24 | Stop reason: HOSPADM

## 2022-08-24 RX ORDER — TORSEMIDE 5 MG/1
10 TABLET ORAL
Qty: 60 TABLET | Refills: 0 | Status: SHIPPED | OUTPATIENT
Start: 2022-08-24 | End: 2022-09-21

## 2022-08-24 RX ORDER — POTASSIUM CHLORIDE 1.5 G/1.58G
20 POWDER, FOR SOLUTION ORAL DAILY
Qty: 30 PACKET | Refills: 0 | Status: SHIPPED | OUTPATIENT
Start: 2022-08-24 | End: 2022-09-21

## 2022-08-24 RX ORDER — MONTELUKAST SODIUM 4 MG/1
3 TABLET, CHEWABLE ORAL
Qty: 90 TABLET | Refills: 0 | Status: SHIPPED | OUTPATIENT
Start: 2022-08-24 | End: 2022-09-21

## 2022-08-24 RX ORDER — TORSEMIDE 5 MG/1
5 TABLET ORAL DAILY
Qty: 30 TABLET | Refills: 0 | Status: SHIPPED | OUTPATIENT
Start: 2022-08-25 | End: 2022-08-24

## 2022-08-24 RX ORDER — LOPERAMIDE HCL 2 MG
2 CAPSULE ORAL 2 TIMES DAILY PRN
COMMUNITY
Start: 2022-08-24 | End: 2022-09-21

## 2022-08-24 RX ORDER — TORSEMIDE 5 MG/1
5 TABLET ORAL DAILY
Status: DISCONTINUED | OUTPATIENT
Start: 2022-08-25 | End: 2022-08-24 | Stop reason: HOSPADM

## 2022-08-24 RX ORDER — TORSEMIDE 5 MG/1
5 TABLET ORAL DAILY
Qty: 30 TABLET | Refills: 0 | Status: SHIPPED | OUTPATIENT
Start: 2022-08-25 | End: 2022-09-21

## 2022-08-24 RX ORDER — POTASSIUM CHLORIDE 1.5 G/1.58G
20 POWDER, FOR SOLUTION ORAL 2 TIMES DAILY
Status: DISCONTINUED | OUTPATIENT
Start: 2022-08-24 | End: 2022-08-24 | Stop reason: HOSPADM

## 2022-08-24 RX ADMIN — ONDANSETRON 4 MG: 4 TABLET, ORALLY DISINTEGRATING ORAL at 11:43

## 2022-08-24 RX ADMIN — ALBUTEROL SULFATE 2 PUFF: 90 AEROSOL, METERED RESPIRATORY (INHALATION) at 08:55

## 2022-08-24 RX ADMIN — METHOCARBAMOL TABLETS 1500 MG: 500 TABLET, COATED ORAL at 08:59

## 2022-08-24 RX ADMIN — DIPHENOXYLATE HYDROCHLORIDE AND ATROPINE SULFATE 2 TABLET: 2.5; .025 TABLET ORAL at 09:13

## 2022-08-24 RX ADMIN — NYSTATIN 500000 UNITS: 100000 SUSPENSION ORAL at 12:43

## 2022-08-24 RX ADMIN — CARVEDILOL 6.25 MG: 6.25 TABLET, FILM COATED ORAL at 08:58

## 2022-08-24 RX ADMIN — LEVOTHYROXINE SODIUM 50 MCG: 0.05 TABLET ORAL at 06:41

## 2022-08-24 RX ADMIN — Medication 5000 UNITS: at 08:59

## 2022-08-24 RX ADMIN — TORSEMIDE 10 MG: 5 TABLET ORAL at 08:57

## 2022-08-24 RX ADMIN — DIPHENOXYLATE HYDROCHLORIDE AND ATROPINE SULFATE 2 TABLET: 2.5; .025 TABLET ORAL at 16:40

## 2022-08-24 RX ADMIN — ALBUTEROL SULFATE 2 PUFF: 90 AEROSOL, METERED RESPIRATORY (INHALATION) at 18:12

## 2022-08-24 RX ADMIN — OLOPATADINE HYDROCHLORIDE 1 DROP: 1.11 SOLUTION/ DROPS OPHTHALMIC at 08:59

## 2022-08-24 RX ADMIN — POTASSIUM CHLORIDE 20 MEQ: 1500 TABLET, EXTENDED RELEASE ORAL at 12:43

## 2022-08-24 RX ADMIN — ALBUTEROL SULFATE 2 PUFF: 90 AEROSOL, METERED RESPIRATORY (INHALATION) at 12:44

## 2022-08-24 RX ADMIN — DIPHENHYDRAMINE HYDROCHLORIDE AND LIDOCAINE HYDROCHLORIDE AND ALUMINUM HYDROXIDE AND MAGNESIUM HYDRO 10 ML: KIT at 12:44

## 2022-08-24 RX ADMIN — ALLOPURINOL 100 MG: 100 TABLET ORAL at 08:57

## 2022-08-24 RX ADMIN — HYDROCODONE BITARTRATE AND ACETAMINOPHEN 1 TABLET: 5; 325 TABLET ORAL at 11:43

## 2022-08-24 RX ADMIN — PROMETHAZINE HYDROCHLORIDE 12.5 MG: 12.5 TABLET ORAL at 00:45

## 2022-08-24 RX ADMIN — NYSTATIN 500000 UNITS: 100000 SUSPENSION ORAL at 08:55

## 2022-08-24 RX ADMIN — NYSTATIN 500000 UNITS: 100000 SUSPENSION ORAL at 18:11

## 2022-08-24 RX ADMIN — MONTELUKAST SODIUM 3 G: 4 TABLET, CHEWABLE ORAL at 11:48

## 2022-08-24 RX ADMIN — VENLAFAXINE HYDROCHLORIDE 150 MG: 150 CAPSULE, EXTENDED RELEASE ORAL at 08:57

## 2022-08-24 RX ADMIN — APIXABAN 5 MG: 5 TABLET, FILM COATED ORAL at 08:57

## 2022-08-24 RX ADMIN — DIPHENOXYLATE HYDROCHLORIDE AND ATROPINE SULFATE 2 TABLET: 2.5; .025 TABLET ORAL at 12:43

## 2022-08-24 RX ADMIN — DOXYCYCLINE HYCLATE 100 MG: 100 CAPSULE ORAL at 08:57

## 2022-08-24 ASSESSMENT — ACTIVITIES OF DAILY LIVING (ADL)
ADLS_ACUITY_SCORE: 25
ADLS_ACUITY_SCORE: 23
ADLS_ACUITY_SCORE: 25
ADLS_ACUITY_SCORE: 23

## 2022-08-24 NOTE — PLAN OF CARE
Goal Outcome Evaluation:        VSS, BP was soft overnight. Given Norco once for left flank pain with minimal relief. Ice pack utilized and more effective for flank pain. Given Zofran and then promethazine for nausea. Marie essential oils utilized. Had one small lose/soft stool. Stick note left regarding patient questions about Voltaren gel and Lidocaine. Plan is for patient is discharge home today.      Problem: Plan of Care - These are the overarching goals to be used throughout the patient stay.    Goal: Readiness for Transition of Care  Outcome: Ongoing, Progressing     Problem: Diarrhea  Goal: Fluid and Electrolyte Balance  Outcome: Ongoing, Progressing  Intervention: Manage Diarrhea  Recent Flowsheet Documentation  Taken 8/24/2022 0030 by Snehal Villareal RN  Medication Review/Management: medications reviewed  Taken 8/23/2022 2033 by Snehal Villareal RN  Medication Review/Management: medications reviewed       Snehal Villareal RN

## 2022-08-24 NOTE — PROGRESS NOTES
Care Management Follow Up    Length of Stay (days): 5    Expected Discharge Date: 08/24/2022     Concerns to be Addressed: discharge planning     Patient plan of care discussed at interdisciplinary rounds: Yes    Anticipated Discharge Disposition: Home     Anticipated Discharge Services: None  Anticipated Discharge DME: None    Patient/family educated on Medicare website which has current facility and service quality ratings: no  Education Provided on the Discharge Plan:    Patient/Family in Agreement with the Plan: yes    Referrals Placed by CM/SW:  (Not currently indicated.)  Private pay costs discussed: Not applicable    Additional Information:  Met with pt to discuss recommendation for Home PT.  Pt is in agreement with home PT and referrals to agency that accepts her insurance and has availability.  Referral made and accepted by [facility]      SARTHAK Kennedy

## 2022-08-24 NOTE — PROGRESS NOTES
[facility] ACUTE PAIN SERVICE    Daily PAIN Progress Note    Assessment/Plan:  Sandy Spencer is a 81 year old female who was admitted on 8/17/2022.  Pain team was asked to see the patient for left flank pain, pain management, multiple allergies. Admitted for diarrhea, CAP - started on doxycycline and ceftriaxone. 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 Describes pain as 5/10 and aching in left flank, low back, neck, right shoulder and elbow, left ankle. The patient denies nausea, vomiting, constipation, diarrhea, chest pain, shortness of breath.    Post op day: 2 Days Post-Op. SIGMOIDOSCOPY WITH BIOPSIES    Opioid Induced Respiratory Depression Risk Assessment:?  (Low 0-1; Moderate 2-4; or High >4 or >/= 3 if two of the risk factors are age > 60 and opioid naive) due to the following risk factors: ALIYAH, COPD/Asthma/pulmonary disease, CHF, renal dysfunction, hepatic dysfunction, Obesity, Smoker, Age>60, >2 opioid therapies, concomitant CNS depressants, opioid naive status, or post surgical.     PLAN:   1) Pain is consistent with chronic pain secondary to CRPS/RSD and left flank pain of unclear cause. Renal ultrasound completed and reviewed. She is no longer prescribed opioids.  Labs and imaging indicated: I have personally reviewed pertinent labs, tests, and radiologic imaging in patient's chart. Treatment plan includes: multimodal pain approach, Hospital Medicine Service for medical management. Patient educated regarding: multimodal pain approach, medications as listed below. Patient is understanding of the plan. All questions and concerns addressed to patient's satisfaction.   2)Multimodal Medication Therapy  Topical: Voltaren gel bid, lidocaine ointment qid - (addendum: 6900 - RN paged and patient requesting lidocaine patch, will discontinue ointment and order  patch)  NSAID'S: CrCl 21.8  Muscle Relaxants: Robaxin 1500 mg BID  Adjuvants: Adderall on hold  Antidepressants/anxiolytics:Venlafaxine, Trazodone  Opioids: Norco 5-325 mg q4h prn   IV Pain medication: none  3)Non-medication interventions: ice, heat   Acupuncture consult - offered and agreeable   4)Constipation Prophylaxis: Diarrhea on lomotil      -Opioid prescriber has been none recent  -[place]  pulled from system on 8/24/22. This indicates:   Adderall, Lomotil  No opioids prescribed since 02/22  Discharge Recommendations - We recommend prescribing the following at the time of discharge:no opioids, topicals if effective.     Subjective:  Reports chronic pain of neck, low back, right shoulder and elbow, let ankle. Reports ongoing left flank pain. Denies nausea, vomiting, constipation, chest pain, shortness of breath. Reports loose stools.  Reports lightheadedness.     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    Hypothyroidism    Bilateral carotid artery stenosis    Coronary artery disease involving native coronary artery of native heart with angina pectoris (H)    Other chronic pulmonary embolism without acute cor pulmonale (H)    History of posttraumatic stress disorder (PTSD)    Bipolar disorder, unspecified (H)    Hypokalemia    Hypotension due to hypovolemia      Objective:  Vital signs in last 24 hours:  BP 96/54 (BP Location: Left arm)   Pulse 77   Temp 97.8  F (36.6  C) (Oral)   Resp 16   Wt 67.1 kg (148 lb)   LMP  (LMP Unknown)   SpO2 95%   BMI 26.22 kg/m    Weight:   Vitals:    08/17/22 1902   Weight: 67.1 kg (148 lb)      Weight change:   Body mass index is 26.22 kg/m .    Intake/Output last 3 shifts:  I/O last 3 completed shifts:  In: 480 [P.O.:480]  Out: 200 [Urine:200]  Intake/Output this shift:  No intake/output data recorded.    Review of Systems:   As per subjective, all others negative.    Physical Exam:  General  Appearance:  Alert, cooperative, no distress   Head:  Normocephalic, without obvious abnormality   Eyes:  PERRL, conjunctiva/corneas clear   Nose: Nares normal, septum midline   Throat: Lips, mucosa, and tongue normal; teeth and gums normal   Neck: Supple, symmetrical, trachea midline   Lungs:   Clear to auscultation bilaterally, respirations unlabored, room air    Heart:  Regular rate and rhythm, S1, S2 normal    Abdomen:   Soft, non-tender, bowel sounds active all four quadrants   Extremities: Extremities normal, atraumatic   Skin: Skin warm, dry    Neurologic: Alert and oriented X 3, Moves all 4 extremities     Imaging: Reviewed I have personally reviewed pertinent labs, tests, and radiologic imaging in patient's chart.      Labs: Reviewed I have personally reviewed pertinent labs, tests, and radiologic imaging in patient's chart.  Recent Results (from the past 24 hour(s))   UA reflex to Microscopic and Culture    Collection Time: 08/23/22  1:51 PM    Specimen: Urine, Midstream   Result Value Ref Range    Color Urine Colorless Colorless, Straw, Light Yellow, Yellow    Appearance Urine Clear Clear    Glucose Urine Negative Negative mg/dL    Bilirubin Urine Negative Negative    Ketones Urine Negative Negative mg/dL    Specific Gravity Urine 1.010 1.001 - 1.030    Blood Urine Negative Negative    pH Urine 5.0 5.0 - 7.0    Protein Albumin Urine Negative Negative mg/dL    Urobilinogen Urine <2.0 <2.0 mg/dL    Nitrite Urine Negative Negative    Leukocyte Esterase Urine 25 Magaly/uL (A) Negative    Bacteria Urine Few (A) None Seen /HPF    Mucus Urine Present (A) None Seen /LPF    RBC Urine 1 <=2 /HPF    WBC Urine 3 <=5 /HPF    Squamous Epithelials Urine 1 <=1 /HPF    Hyaline Casts Urine 1 <=2 /LPF    Granular Casts Urine 6 (H) None Seen /LPF   Potassium    Collection Time: 08/23/22  2:48 PM   Result Value Ref Range    Potassium 3.5 3.5 - 5.0 mmol/L   Basic metabolic panel    Collection Time: 08/24/22 10:36 AM   Result  Value Ref Range    Sodium 137 136 - 145 mmol/L    Potassium 3.2 (L) 3.5 - 5.0 mmol/L    Chloride 108 (H) 98 - 107 mmol/L    Carbon Dioxide (CO2) 21 (L) 22 - 31 mmol/L    Anion Gap 8 5 - 18 mmol/L    Urea Nitrogen 41 (H) 8 - 28 mg/dL    Creatinine 1.93 (H) 0.60 - 1.10 mg/dL    Calcium 9.3 8.5 - 10.5 mg/dL    Glucose 86 70 - 125 mg/dL    GFR Estimate 26 (L) >60 mL/min/1.73m2         Total time spent 26 minutes with greater than 50% in consultation, education and coordination of care.     Also discussed with RN, pharmacist.       CYNDI Jaffe-C  Acute Care Pain Management Program  [facility] ([place], [place], [place])  Monday-Friday 8a-4p   Page via online paging system or call [phone]

## 2022-08-24 NOTE — TELEPHONE ENCOUNTER
I spoke with Kerry. She hopes to be discharged today. Reviewed it would be ok to try the diclofenac gel, but discontinue if ineffective. I have a follow up with her next week.

## 2022-08-24 NOTE — CONSULTS
"ACUPUNCTURIST TREATMENT NOTE    Name: Sandy Spencer  :  1940  MRN:  [id number]    Acupuncture Treatment  Patient Type: Medical  Intervention Reason: Pain, Nausea  Pain Location: (L) flank  Pre-session Pain Ratin  Post-session Pain Ratin  Pre-session Nausea ratin  Post-session Nausea ratin  Patient complaint:: (L) flank pain, nausea  Initial insertions: Yin ricketts, Du 19, 20, 21, (R) Si 3, (R) Ling gu, (L) Bl 60, 62, 65, Kidney gate, Karina 3, P 6         \"Risks and benefits of acupuncture were discussed with patient. Consent for treatment was given. We thank you for the referral.\"     Jadon Andre    Date:  2022  Time:  2:47 PM    "

## 2022-08-24 NOTE — PROGRESS NOTES
Patient has lightheadedness but low BP.  Discussed risk/benifit of letting bp run higher which we will try.  Recheck BMP and re-eval labs this afternoon.  Likely discharge home after that.

## 2022-08-24 NOTE — PLAN OF CARE
Problem: Plan of Care - These are the overarching goals to be used throughout the patient stay.    Goal: Absence of Hospital-Acquired Illness or Injury  Outcome: Ongoing, Progressing  Intervention: Identify and Manage Fall Risk  Recent Flowsheet Documentation  Taken 8/23/2022 1627 by Ashlee Abraham RN  Safety Promotion/Fall Prevention:   activity supervised   bed alarm on   chair alarm on   clutter free environment maintained   fall prevention program maintained   nonskid shoes/slippers when out of bed   room near nurse's station   safety round/check completed  Intervention: Prevent and Manage VTE (Venous Thromboembolism) Risk  Recent Flowsheet Documentation  Taken 8/23/2022 1627 by Ashlee Abraham RN  Activity Management: ambulated to bathroom     Problem: Plan of Care - These are the overarching goals to be used throughout the patient stay.    Goal: Optimal Comfort and Wellbeing  Outcome: Ongoing, Progressing  Intervention: Monitor Pain and Promote Comfort  Recent Flowsheet Documentation  Taken 8/23/2022 1639 by Ashlee Abraham RN  Pain Management Interventions: medication (see MAR)     Problem: Hypertension Comorbidity  Goal: Blood Pressure in Desired Range  Outcome: Ongoing, Progressing  Intervention: Maintain Blood Pressure Management  Recent Flowsheet Documentation  Taken 8/23/2022 1627 by Ashlee Abraham RN  Medication Review/Management: medications reviewed     Problem: Infection (Pneumonia)  Goal: Resolution of Infection Signs and Symptoms  Outcome: Ongoing, Progressing     Problem: Diarrhea  Goal: Fluid and Electrolyte Balance  Outcome: Ongoing, Progressing  Intervention: Manage Diarrhea  Recent Flowsheet Documentation  Taken 8/23/2022 1627 by Ashlee Abraham RN  Medication Review/Management: medications reviewed       Pt. A&O x4. Vitally satble. Continues to present with pain to the left flank. Prn Norco and scheduled lidocaine ointment administered with minimal relieve.

## 2022-08-24 NOTE — DISCHARGE SUMMARY
[facility]  Hospitalist Discharge Summary      Date of Admission:  8/17/2022  Date of Discharge:  8/24/2022  Discharging Provider: Shankar Uribe MD  Discharge Service: Hospitalist Service    Discharge Diagnoses   Persistent Diarrhea    Follow-ups Needed After Discharge   Follow-up Appointments     Follow-up and recommended labs and tests       Follow up with primary care provider, Caitlyn Rees, within 7 days to   evaluate medication change and for hospital follow- up.  The following   labs/tests are recommended: BMP, magnesium.    Follow up with nephrology as directed.    Follow up with [facility] for ongoing stool incontinence.    Follow with Dr. Rickey dozier the heart care clinic as directed.    Follow up with Dr. Ramirez for chronic [facility]   [place].             Unresulted Labs Ordered in the Past 30 Days of this Admission     No orders found from 7/18/2022 to 8/18/2022.      These results will be followed up by n/a.    Discharge Disposition   Discharged to home with Home PT  Condition at discharge: Stable    Hospital Course   81 year old female admitted on 8/17 for persistent diarrhea.  Pt  Has a history of subtotal colectomy, post cholecystectomy on cholestipol and lomotil.  On admission the diarrhea was thought to be medication related.  It was stopped.      Problem list:     1.  Diarrhea, persistent symptoms reported but only 2 stools documented yesterday and one today.  - colonoscopy completed GI input Appreciated; will restart lomtil; biopsies unrevealing  - If evidence of microscopic colitis on follow up results would start Budesonide.  - Starting citrucel BID and sending to pelvic floor center for evaluation.               2.  History of PE: continue eliquis    3.  CAP: video swallow study negative; afebrile; on oral doxy until follow up apt; discontinued rocephin on 8/23 after completing course.    4.  Acute on chronic renal disease: (history  of stage 4 renal with nephrectomy)  - continue to monitor renal fxn as outpatient.  D/W patient this should improve now that diarrhea is decreased and we will let her bp run a little higher.  - Avoid nephrotoxins    5.  Oral thrush; mild on left side of tongue; noticed new today; held fluticosone, completed tx with nystatin oral               6.  Left flank pain: pt states her urine is burning; Renal ultrasound showed no acute pathology.  Patient on broad spectrum abx an no fever or other sx of UTI.    7.  Chronic pain: Resume outpatient follow up by Dr. Ramirez at the pain clinic.  Appreciate inpatient pain team input.  Her symptom control is very complex.  Declined to discharge on opioids.  Continue lidoderm patches at discharge.     8.  CAD history with difficult to control lipids: pt with new septal infarct on admitting EKG (new since 7/19/2022); just had a cardiac MRI in July with EF of 60%;  - Discussed need to optimize risk factors including cholesterol; she follows  - Follows closely with Dr Lang    9. Lightheadedness: Her bp is controlled very closely and demadex is not held until spb <100.  Discussed risk/benifit and she agrees to hold demadex at sbp <120 until follow up to see if this helps.    10. Hypokalemia - intermittently required potassium replacement.  This is likely due to loop diuretic and poor PO intake, so will give her KlorCon daily.  This should be recheked as outpatient.    Consultations This Hospital Stay   SPEECH LANGUAGE PATH ADULT IP CONSULT  CARE MANAGEMENT / SOCIAL WORK IP CONSULT  GASTROENTEROLOGY IP CONSULT  SPIRITUAL HEALTH SERVICES IP CONSULT  PAIN MANAGEMENT ADULT IP CONSULT  PHYSICAL THERAPY ADULT IP CONSULT  ACUPUNCTURE IP CONSULT  ACUPUNCTURE IP CONSULT    Code Status   Full Code    Time Spent on this Encounter   IShankar MD, personally saw the patient today and spent greater than 30 minutes discharging this patient.       Shankar Uribe MD  [facility]  [facility] [address]  [address] [place] [id number]  Phone: [phone]  Fax: [phone]  ______________________________________________________________________    Physical Exam   Vital Signs: Temp: 99.4  F (37.4  C) Temp src: Oral BP: 95/51 Pulse: 80   Resp: 18 SpO2: 95 % O2 Device: None (Room air)    Weight: 148 lbs 0 oz  Constitutional: awake, alert, cooperative, no apparent distress, and appears stated age  Eyes: sclera clear  ENT: normocepalic, without obvious abnormality  Respiratory: No increased work of breathing, good air exchange, clear to auscultation bilaterally, no crackles or wheezing.  Cardiovascular: normal S1 and S2  GI: normal bowel sounds and mild tenderness noted diffusely  Skin: no rashes and no jaundice  Neurologic: Awake, alert, oriented to name, place and time.  Cranial nerves II-XII are grossly intact.  Moves all extremities.       Primary Care Physician   Caitlyn Rees    Discharge Orders      Home Care Referral      Follow-up and recommended labs and tests     Follow up with primary care provider, Caitlyn Rees, within 7 days to evaluate medication change and for hospital follow- up.  The following labs/tests are recommended: BMP, magnesium.    Follow up with nephrology as directed.    Follow up with [facility] for ongoing stool incontinence.    Follow with Dr. Rickey dozier the heart care clinic as directed.    Follow up with Dr. Ramirez for chronic pain at the [facility] in [place].     Activity    Your activity upon discharge: activity as tolerated     Reason for your hospital stay    Pneumonia     Diet    Follow this diet upon discharge: Orders Placed This Encounter      Regular Diet Adult       Significant Results and Procedures   Results for orders placed or performed during the hospital encounter of 08/17/22   CT Abdomen Pelvis w/o Contrast    Narrative    EXAM: CT ABDOMEN PELVIS W/O CONTRAST  LOCATION: [facility]  HOSPITAL  DATE/TIME: 8/17/2022 8:53 PM    INDICATION: abdominal pain  COMPARISON: 4/15/2021  TECHNIQUE: CT scan of the abdomen and pelvis was performed without IV contrast. Multiplanar reformats were obtained. Dose reduction techniques were used.  CONTRAST: None.    FINDINGS:   LOWER CHEST: Faint patches of groundglass attenuation in the right lower lobe, also present on the prior study, but more extensive currently. Calcified mitral annulus.    HEPATOBILIARY: Liver cysts. Cholecystectomy. Mild bile duct dilation from reservoir effect.    PANCREAS: Normal.    SPLEEN: Normal.    ADRENAL GLANDS: Normal.    KIDNEYS/BLADDER: Tiny nonobstructing calyceal tip stone upper pole left kidney. Right nephrectomy.    BOWEL: Partial colectomy with RLQ anastomosis.    LYMPH NODES: Normal.    VASCULATURE: Marked calcified atherosclerosis.    PELVIC ORGANS: Hysterectomy. Pelvic phleboliths.    MUSCULOSKELETAL: Calcified injection granulomas in the buttocks. Osteoporosis. Mild degenerative change in the spine.      Impression    IMPRESSION:   1.  No findings to account for abdominal pain  2.  Areas of groundglass attenuation in the right lower lobe could represent pneumonia or bronchiolitis. Request correlation with any chest symptoms. Consider aspiration, given presence of similar findings on the prior study.     XR Chest 2 Views    Narrative    EXAM: XR CHEST 2 VIEWS  LOCATION: [facility]  DATE/TIME: 8/17/2022 10:03 PM    INDICATION: RLL consolidation on CT abdomen pelvis  COMPARISON: CT performed earlier today      Impression    IMPRESSION:     Hazy opacities are noted in the right lower lobe which likely correlate with the findings seen on earlier CT. The cardiomediastinal silhouette is within normal limits. Cholecystectomy clips are noted in the abdomen. No acute osseous injury.   XR Video Swallow with SLP or OT    Narrative    EXAM: XR VIDEO SWALLOW WITH SLP OR OT  LOCATION: [facility]  HOSPITAL  DATE/TIME: 8/19/2022 8:34 AM    INDICATION: Difficulty swallowing.  COMPARISON: None.    TECHNIQUE: Routine swallow study with speech pathology using multiple barium thicknesses.    FINDINGS:   FLUOROSCOPIC TIME: .7 minutes  NUMBER OF IMAGES: 0    Swallow study with Speech Pathology using multiple barium thicknesses.     No penetration or aspiration with all barium consistencies. Moderately prominent cricopharyngeus muscle.      Impression    IMPRESSION:  1.  No significant risk for aspiration.     XR Esophagram    Narrative    EXAM: XR ESOPHAGRAM  LOCATION: [facility]  DATE/TIME: 8/19/2022 8:37 AM    INDICATION: dysphagia  COMPARISON: None.  TECHNIQUE: Routine.    FINDINGS:  FLUOROSCOPIC TIME: 1.7 minutes  NUMBER OF IMAGES: 3    ESOPHAGUS: Very mild presbyesophagus. Residuals in the esophagus. Mild gastroesophageal reflux in recumbent position. No strictures or masses.      Impression    IMPRESSION:  1.  Mild presbyesophagus.    2.  Mild gastroesophageal reflux in the recumbent position.   US Kidney Left    Narrative    EXAM: US KIDNEY LEFT  LOCATION: [facility]  DATE/TIME: 8/23/2022 2:33 PM    INDICATION: Left flank pain; rising creatinine; pt with 1 kidney.  COMPARISON: Noncontrast CT AP 08/17/2022.  TECHNIQUE: Routine Left Renal and Bladder Ultrasound.    FINDINGS:    LEFT KIDNEY: 10.3 x 4.9 x 4.1 cm. Normal size and parenchymal echotexture with no hydronephrosis or mass.     BLADDER: Normal.      Impression    IMPRESSION:    1. Normal left kidney.  2. The 2 mm nonobstructing stone in the upper pole of the right kidney observed at recent 08/17/2022 noncontrast CT is not visible sonographically.     *Note: Due to a large number of results and/or encounters for the requested time period, some results have not been displayed. A complete set of results can be found in Results Review.       Discharge Medications   Current Discharge Medication List       START taking these medications    Details   bisacodyl (DULCOLAX) 5 MG EC tablet Take two (2) tablet at 4 pm the day before your procedure.  If your procedure is before 11 am, take two (2) additional tablets at 8 pm.  If your procedure is after 11 am, take two (2) additional tablets at 6 am. For additional instructions refer to your colonoscopy prep instructions.  Qty: 4 tablet, Refills: 0    Associated Diagnoses: Diarrhea, unspecified type      doxycycline hyclate (VIBRAMYCIN) 100 MG capsule Take 1 capsule (100 mg) by mouth every 12 hours for 7 days  Qty: 14 capsule, Refills: 0    Associated Diagnoses: Pneumonia due to infectious organism, unspecified laterality, unspecified part of lung      Lidocaine (LIDOCARE) 4 % Patch Place 3 patches onto the skin every 24 hours for 30 days To prevent lidocaine toxicity, patient should be patch free for 12 hrs daily.  Qty: 90 patch, Refills: 0    Associated Diagnoses: Complex regional pain syndrome type 1 of both lower extremities; Complex regional pain syndrome type 1 of right upper extremity      loperamide (IMODIUM) 2 MG capsule Take 1 capsule (2 mg) by mouth 2 times daily as needed for diarrhea (loose stools)    Associated Diagnoses: Diarrhea, unspecified type      methylcellulose (CITRUCEL) 500 MG TABS tablet Take 1 tablet (500 mg) by mouth 2 times daily as needed (for diarrhea)    Associated Diagnoses: Diarrhea, unspecified type      polyethylene glycol (MIRALAX) 17 GM/Dose powder - Mix 8.3 oz of miralax powder with 64 oz of gatorade or other sport drink (no red or purple).  For additional instructions refer to your colonoscopy prep instructions.  Qty: 238 g, Refills: 0    Associated Diagnoses: Diarrhea, unspecified type      potassium chloride (KLOR-CON) 20 MEQ packet Take 20 mEq by mouth daily for 30 days  Qty: 30 packet, Refills: 0    Associated Diagnoses: Diarrhea, unspecified type      simethicone (MYLICON) 80 MG chewable tablet Take 1 tablet (80 mg) by mouth every  6 hours as needed for cramping  Qty: 30 tablet, Refills: 0    Associated Diagnoses: Diarrhea, unspecified type         CONTINUE these medications which have CHANGED    Details   colestipol (COLESTID) 1 g tablet Take 3 tablets (3 g) by mouth daily (with lunch) for 30 days  Qty: 90 tablet, Refills: 0    Associated Diagnoses: High cholesterol      rosuvastatin (CRESTOR) 20 MG tablet Take 1 tablet (20 mg) by mouth At Bedtime      !! torsemide (DEMADEX) 5 MG tablet Take 2 tablets (10 mg) by mouth daily (with lunch) for 30 days Hold for sbp <120  Qty: 60 tablet, Refills: 0    Associated Diagnoses: Benign essential hypertension      !! torsemide (DEMADEX) 5 MG tablet Take 1 tablet (5 mg) by mouth daily Hold for sbp <120  Qty: 30 tablet, Refills: 0    Associated Diagnoses: Benign essential hypertension       !! - Potential duplicate medications found. Please discuss with provider.      CONTINUE these medications which have NOT CHANGED    Details   albuterol (PROAIR HFA/PROVENTIL HFA/VENTOLIN HFA) 108 (90 Base) MCG/ACT inhaler Inhale 2 puffs into the lungs every 6 hours  Qty: 18 g, Refills: 4    Comments: Pharmacy may dispense brand covered by insurance (Proair, or proventil or ventolin or generic albuterol inhaler)  Associated Diagnoses: Shortness of breath; Post-COVID chronic dyspnea      allopurinol (ZYLOPRIM) 100 MG tablet Take 1 tablet (100 mg) by mouth 2 times daily  Qty: 180 tablet, Refills: 1    Associated Diagnoses: Arthralgia of both hands      amphetamine-dextroamphetamine (ADDERALL) 20 MG tablet Take 1 tablet (20 mg) by mouth 2 times daily May fill 7/26/22  Qty: 60 tablet, Refills: 0    Associated Diagnoses: Lumbar radiculopathy      apixaban ANTICOAGULANT (ELIQUIS) 5 MG tablet Take 1 tablet (5 mg) by mouth 2 times daily  Qty: 180 tablet, Refills: 3    Associated Diagnoses: Other chronic pulmonary embolism without acute cor pulmonale (H)      azelastine (ASTEPRO) 0.15 % nasal spray Spray 2 sprays into both  nostrils 2 times daily as needed      carvedilol (COREG) 6.25 MG tablet Take 1 tablet (6.25 mg) by mouth 2 times daily (with meals)  Qty: 180 tablet, Refills: 1    Associated Diagnoses: Hypertension secondary to other renal disorders      Cholecalciferol (VITAMIN D-3) 125 MCG (5000 UT) TABS Take 5,000 Units by mouth daily      fluticasone (ARNUITY ELLIPTA) 50 MCG/ACT inhaler Inhale 1 puff into the lungs daily  Qty: 1 each, Refills: 6    Associated Diagnoses: Shortness of breath; Post-COVID chronic dyspnea      HEMP OIL OR EXTRACT OR OTHER CBD CANNABINOID, NOT MEDICAL CANNABIS, 50 mg At Bedtime Delta 8 Gummies      levothyroxine (SYNTHROID/LEVOTHROID) 50 MCG tablet Take 1 tablet (50 mcg) by mouth daily  Qty: 90 tablet, Refills: 3    Associated Diagnoses: Acquired hypothyroidism      lidocaine (LIDODERM) 5 % patch Place 1 patch onto the skin every 24 hours To prevent lidocaine toxicity, patient should be patch free for 12 hrs daily.  Qty: 30 patch, Refills: 0    Associated Diagnoses: Myofascial pain      methocarbamol (ROBAXIN) 500 MG tablet Take 3 tablets (1,500 mg) by mouth 2 times daily  Qty: 540 tablet, Refills: 0    Comments: Dose increase  Associated Diagnoses: Lumbar radiculopathy      olopatadine (PATANOL) 0.1 % ophthalmic solution Place 1 drop into both eyes 2 times daily  Qty: 5 mL, Refills: 11    Associated Diagnoses: Allergic conjunctivitis, bilateral      traZODone (DESYREL) 100 MG tablet TAKE 3 TO 4 TABLETS BY MOUTH AT BEDTIME  Qty: 360 tablet, Refills: 1    Associated Diagnoses: Chronic pain syndrome      venlafaxine (EFFEXOR XR) 150 MG 24 hr capsule Take 1 capsule (150 mg) by mouth daily  Qty: 90 capsule, Refills: 0    Comments: Dose increase  Associated Diagnoses: Anxiety disorder due to medical condition      zonisamide (ZONEGRAN) 25 MG capsule Take 2 capsules (50 mg) by mouth At Bedtime  Qty: 60 capsule, Refills: 3    Associated Diagnoses: Arthritis of finger of right hand         STOP taking these  "medications       diphenoxylate-atropine (LOMOTIL) 2.5-0.025 MG tablet Comments:   Reason for Stopping:             Allergies   Allergies   Allergen Reactions     Acamprosate Other (See Comments), Headache and Nausea and Vomiting     Augmentin GI Disturbance     Carbamazepine Other (See Comments)     Patient felt \"drunk\".      Cyclobenzaprine Shortness Of Breath, Other (See Comments) and Unknown     Mouth ulcers and sores per pt       Mirtazapine      Other reaction(s): slowed breathing     Prednisone      Pregabalin Shortness Of Breath, Other (See Comments), Anaphylaxis and Unknown     Throat closes per pt       Sulfa Drugs Shortness Of Breath, Anaphylaxis and Unknown     Sulfamethoxazole-Trimethoprim      Other reaction(s): Respiratory problems, e.g., wheezingDermatological problems, e.g., rash, hives     Zolpidem Other (See Comments)     Sleep walking       Acetaminophen Other (See Comments)     Rebound headaches       Amiloride Swelling and Unknown     Amoxicillin Diarrhea, Nausea and Vomiting and Unknown     Aspirin Other (See Comments)     History of gastric ulcers and instructed to not take more than 81 mg/d, 10/5/17 takes 81 mg at home  Stomach        Bupropion Other (See Comments)     Dizziness, confusion, fell 3 times. Mental Confusion.      Chromium Other (See Comments)     Staples: Reaction to all metals.States serious reactions after surgery        Duloxetine Hcl Unknown     Nsaids Other (See Comments)     H/o Stomach ulcers       Other Drug Allergy (See Comments)      [place]: turned black into the groin, was told to never have staples again     Oxycodone Headache     Serotonin Other (See Comments)     Sulindac      Tolmetin Other (See Comments) and Unknown     History of ulcer       Verapamil Other (See Comments)     Bradycardia     Valproic Acid Rash     "

## 2022-08-24 NOTE — TELEPHONE ENCOUNTER
Good morning,    Sandy is calling asking to speak with Darlin about a cream she was prescribed.    Voltaren Cream is the medication in question.    She thought she should not be on it due to her kidney failure.    She can be reached on her cell phone.    She is currently at [facility].    To Darlin for review.    Thank you

## 2022-08-25 ENCOUNTER — TELEPHONE (OUTPATIENT)
Dept: FAMILY MEDICINE | Facility: CLINIC | Age: 82
End: 2022-08-25

## 2022-08-25 NOTE — PROGRESS NOTES
Physical Therapy Discharge Summary    Reason for therapy discharge:    Discharged to home with home therapy.    Progress towards therapy goal(s). See goals on Care Plan in [facility] electronic health record for goal details.  Goals not met.  Barriers to achieving goals:   discharge from facility and weakness, dizziness.    Therapy recommendation(s):    Continued therapy is recommended.  Rationale/Recommendations:  Home PT for continued strengthening.

## 2022-08-25 NOTE — TELEPHONE ENCOUNTER
FYI - Status Update    Who is Calling: family member, Daughter Viola     Update: Patient was admitted/discharged from [facility]. Viola is looking to get patient a home health aide. Viola has noticed there has been a decline in patient's health. Wondering if Dr. Rees would be willing to give them a call to discuss further. Viola not on patient's consent to communicate to discuss medial record but is her power of , ok to call patient to reach both of them [phone]     Does caller want a call/response back: Yes     Could we send this information to you in [employer] or would you prefer to receive a phone call?:   Patient would prefer a phone call      Okay to leave a detailed message?: Yes at Home number on file [phone] (home)

## 2022-08-26 ENCOUNTER — PATIENT OUTREACH (OUTPATIENT)
Dept: FAMILY MEDICINE | Facility: CLINIC | Age: 82
End: 2022-08-26

## 2022-08-26 ENCOUNTER — PATIENT OUTREACH (OUTPATIENT)
Dept: CARE COORDINATION | Facility: CLINIC | Age: 82
End: 2022-08-26

## 2022-08-26 NOTE — PROGRESS NOTES
[facility] Contact  [facility]/Voicemail     Clinical Data: Transitional Care Management Outreach     Outreach attempted x 2.  Left message on patient's voicemail, providing [facility]'s 24/7 scheduling and nurse triage phone number [phone] ([phone]) for questions/concerns and/or to schedule an appt with an [facility] provider, if they do not have a PCP.      Plan:  [facility] will do no further outreaches at this time.     MARY Bailey  [phone]  [facility]    *Connected Care Resource Team does NOT follow patient ongoing. Referrals are identified based on internal discharge reports and the outreach is to ensure patient has an understanding of their discharge instructions.

## 2022-08-26 NOTE — TELEPHONE ENCOUNTER
Left message to call back for: patient  Information to relay to patient: see below and let us know what days/ times work well for her and DR Rees can review her schedule.

## 2022-08-26 NOTE — TELEPHONE ENCOUNTER
Unfortunately this is something that needs to be addressed with an appointment. Can we arrange an in person visit in the next week or so to help get this arranged. They didn't document anything that would note that she needs home care in the hospital and insurance requires documentation of why with a visit to pay for it.

## 2022-08-30 ENCOUNTER — NURSE TRIAGE (OUTPATIENT)
Dept: NURSING | Facility: CLINIC | Age: 82
End: 2022-08-30

## 2022-08-30 NOTE — PROGRESS NOTES
Medication Therapy Management (MTM) Encounter    ASSESSMENT:                            Diarrhea: Some change in diarrhea regarding the stool color, but she is still having frequent BMs. Reviewed that the dose of cholestipol is 3 g - I will call Jose M and make sure the order is ready. We reviewed taking separate from other medications (either 1 hour before or 4 hours after). I will also send an rx for Methycellulose since she did not get those. I did encourage her to go to the ED if her symptoms do not improve. Reviewed that Lomotil was stopped. Ok to continue loperamide, although it does not sound helpful.     Nausea: Reviewed to stay hydrated and go to ED if no improvement. Discussed that Lomotil is not for nausea, in fact she was not discharged home with any medication for nausea. Appears that she has been on ondansetron in the past with mixed results, therefore I will send her an rx to use PRN.     Hypertension: BP has been slightly at goal <130/80 mmHg due to CKD but has been very low recently. Agree with her holding torsemide and carvedilol when too low. Encouraged her to continue to monitor.      CAP: Patient to complete doxycycline. Encouraged her to use albuterol often and closely monitor     Acute on chronic renal disease: Will need close monitoring. Reviewed that if she is not feeling well, go to ED.     Oral thrush:Did not discuss in detail today - remain off fluticasone for now and will have Dr. Rees look at her mouth in person.     Edema: Edema does not appear to be worsening despite her holding torsemide when BP is low. Luckily she is now on the K packets so hopefully she is starting to feel better - again encouraged her to go to ED if not. Last K was low.     Hyperlipidemia/PAD: Patient to remain off Praluent 75 mg for now. I will call Jose M to see why she did not receive her rosuvastatin 20 mg.       PLAN:                            1.  Patient to go to ED if she does not feel better   2. Rx sent  for ondansetron 4 mg PRN   3. I will call the pharmacy to check on the prescription for rosuvastatin 20 mg and colestipol  **spoke to pharmacy - they will fill the colestipol, but may need to be ordered. I resent the rx for rosuvastatin.   4. Increase colestipol to 3 g as directed   5. Start Citrucel 500 mg twice daily as directed - sent as rx (spoke to pharmacy, was not covered - they will set aside for her to buy OTC)    Follow-up: Friday 9/2 with Dr. Rees - I will follow up sometime after     SUBJECTIVE/OBJECTIVE:                          Sandy Spencer is a 81 year old female called for a transitions of care visit. She was discharged from [facility] on 8/24/22 for diarrhea and pneumonia.     Reason for visit: Follow up since we last spoke 8/12/22  Medication Adherence/Access:  She used to have home care through [facility] but was discharged.She is trying to get home care again, but needs to see Dr. Rees (appt 9/2).  She is having friends help her. Certain oral medications goes right through her -- has 9 inches of her colon. She is using goodrx coupon at [facility] for some of her medicines.  Prefers capsules.  She gets Praluent and Eliquis through prescription assistance programs.      Diarrhea: Admitted on 8/17 for persistent diarrhea. She has a history of subtotal colectomy, post cholecystectomy. Was taking  cholestipol 1 g x2 (2g) daily and lomotil. Dose on file for Colestipol is for 3g but she was not aware. Cholestipol started by [facility]. On admission the diarrhea was thought to be medication related. Colonoscopy completed, biopsies done. She was started on citrucel 500 mg BID and simethicone and referred to pelvic floor center for evaluation. Lomotil was stopped. She did not receive Citrucel from the pharmacy.   Today she reports that she continues to have diarrhea. The only improvement is that the color is more normal (now brown, was green). She feels like she is missing a plug and when she gets up she has to  go. She is urinating and having a BM at the same time. Having a BM 4-5 times daily.     Nausea: She is also having nausea. Reports that is her biggest complaint. She cannot eat much. She had a few bites of few but mainly has been eating watermelon. She is not drinking much water since it is difficult to get up and get the water. She vomited two nights ago. She thinks that the Lomotil is the nausea medication and she is using without relief.     Hypertension: Continues carvedilol 6.25 mg TWICE DAILY (hold if BP <100/60).   Lisinopril stopped on 2/5/22 due to hypotension.   She does check her blood pressure at home: Reports today BP was 94/80 mmHg, pulse 105. This morning she skipped carvedilol. Other values = 125/78, 121/73, 121/69. Pulse 94  Since home she is feeling very lightheaded and weak. When she stands up it feels overwhelming. Also very tired.   ECHO on 3/31/22 shows EF 60%  BP Readings from Last 3 Encounters:   08/24/22 95/51   08/17/22 96/67   08/03/22 132/76       CAP: RLL infilitrate though no cough. video swallow study negative; afebrile; Discharged on oral doxy 100 mg twice daily x 7 days. She is finishing today. Reports that she is checking her O2 sat at home and it is 86-95%. Having breathing issues - she is using her albuterol more often since the Arnuity is on hold due to thrush.     Acute on chronic renal disease: History of stage 4 renal with nephrectomy. Recommended to continue to monitor renal fxn  outpatient. Upcoming appointment on 9/2.   Creatinine   Date Value Ref Range Status   08/24/2022 1.93 (H) 0.60 - 1.10 mg/dL Final       Oral thrush: Was mild on left side of tongue. Held Arnuity. Completed tx with nystatin oral.     Edema: Currently taking torsemide 10 mg twice daily but holding when SBP is <120 - recommended at discharge.  She is monitoring her edema at home and reports that it is not bad now, only a little swelling in her ankles. She held the torsemide this morning.   While  inpatient she intermittently required potassium replacement likely due to loop diuretic and poor PO intake. Was given KlorCon 20 meq packets daily and recommended to be recheked as outpatient. Per Sandy, she did not get the packets immediately from the pharmacy but she did receive yesterday and has started to take.   Potassium   Date Value Ref Range Status   08/24/2022 3.3 (L) 3.5 - 5.0 mmol/L Final       Hyperlipidemia/PAD: Currently taking no medicaion.  Praluent 75 mg was stopped.  Rosuvastatin 20 mg daily she did not receive from the pharmacy and does not have any at home.   Due to her last lipids checked on 4/8/2022, Dr. Ybarra increased her Praluent but he agreed to decrease back due to possibly contributing to her diarrhea. On 8/15/22 recommended to hold Praluent. Dario gave her vitiligo.   Rosuvastatin increased from 10 mg on 8/17/22. Was on rosuvastatin 40 mg daily and stopped due to elevated CK, but 10 mg started on 7/13/22.   She was approved for the prescription assistance program for Praluent.  MRI stress test done 7/19/22  Last CK level = 135 on 3/4/22  Last lipids checked 8/17/22.       Today's Vitals: LMP  (LMP Unknown)   ----------------    Allergies/ADRs: Reviewed in chart  Past Medical History: Reviewed in chart  Tobacco: She reports that she quit smoking about 21 years ago. She has a 20.00 pack-year smoking history. She has never used smokeless tobacco.  Alcohol: Reviewed in chart    I spent 26 minutes with this patient today. All changes were made via collaborative practice agreement with Caitlyn Rees MD. A copy of the visit note was provided to the patient's primary care provider.    The patient declined a summary of these recommendations.     Post Discharge Medication Reconciliation Status: discharge medications reconciled and changed, per note/orders.      Darlin Elise, Pharm.D., [facility]   Medication Therapy Management Pharmacist   [facility]      Telemedicine Visit Details  Type of service:  Telephone visit  Start Time: 11:32 AM  End Time: 11:58 AM  Originating Location (patient location): Home  Distant Location (provider location):  [facility]

## 2022-08-30 NOTE — TELEPHONE ENCOUNTER
Telephone call    Patient calling to report she is having weakness shortness of breath her o2 sat was 84% but then recovered to 95% after she laid down.  Her pulse is 98.  She also is complaining of explosive diarrhea. She is also dizzy ( light headedness )    Per protocol see in office today.  The phone dropped and could not reconnect with her left message on the voice mail that she needs to be evaluated.    Ingris Mcguire RN   [facility] Nurse Advisor  12:50 PM 8/30/2022        Additional Information    Negative: SEVERE difficulty breathing (e.g., struggling for each breath, speaks in single words)    Negative: Shock suspected (e.g., cold/pale/clammy skin, too weak to stand, low BP, rapid pulse)    Negative: Difficult to awaken or acting confused (e.g., disoriented, slurred speech)    Negative: Fainted, and still feels dizzy afterwards    Negative: Overdose (accidental or intentional) of medications    Negative: New neurologic deficit that is present now: * Weakness of the face, arm, or leg on one side of the body * Numbness of the face, arm, or leg on one side of the body * Loss of speech or garbled speech    Negative: Heart beating < 50 beats per minute OR > 140 beats per minute    Negative: Sounds like a life-threatening emergency to the triager    Negative: Chest pain    Negative: Rectal bleeding, bloody stool, or tarry-black stool    Negative: Vomiting is main symptom    Negative: Diarrhea is main symptom    Negative: Headache is main symptom    Negative: Heat exhaustion suspected (i.e., dehydration from heat exposure)    Negative: Patient states that they are having an anxiety or panic attack    Negative: Dizziness from low blood sugar (i.e., < 60 mg/dl or 3.5 mmol/l)    Negative: SEVERE dizziness (e.g., unable to stand, requires support to walk, feels like passing out now)    Negative: SEVERE headache or neck pain    Negative: Spinning or tilting sensation (vertigo) present now and one or more  stroke risk factors (i.e., hypertension, diabetes mellitus, prior stroke/TIA, heart attack, age over 60) (Exception: prior physician evaluation for this AND no different/worse than usual)    Negative: Neurologic deficit that was brief (now gone), ANY of the following:* Weakness of the face, arm, or leg on one side of the body* Numbness of the face, arm, or leg on one side of the body* Loss of speech or garbled speech    Negative: Loss of vision or double vision  (Exception: Similar to previous migraines.)    Negative: Extra heart beats OR irregular heart beating (i.e., 'palpitations')    Negative: Difficulty breathing    Negative: Drinking very little and has signs of dehydration (e.g., no urine > 12 hours, very dry mouth, very lightheaded)    Negative: Follows bleeding (e.g., stomach, rectum, vagina)  (Exception: Became dizzy from sight of small amount blood.)    Negative: Patient sounds very sick or weak to the triager    Negative: Lightheadedness (dizziness) present now, after 2 hours of rest and fluids    Negative: Spinning or tilting sensation (vertigo) present now    Negative: Fever > 103 F (39.4 C)    Negative: Fever > 100.0 F (37.8 C) and has diabetes mellitus or a weak immune system (e.g., HIV positive, cancer chemotherapy, organ transplant, splenectomy, chronic steroids)    Protocols used: DIZZINESS-A-OH

## 2022-08-31 ENCOUNTER — VIRTUAL VISIT (OUTPATIENT)
Dept: PHARMACY | Facility: CLINIC | Age: 82
End: 2022-08-31
Payer: COMMERCIAL

## 2022-08-31 DIAGNOSIS — R60.1 GENERALIZED EDEMA: ICD-10-CM

## 2022-08-31 DIAGNOSIS — E78.00 HYPERCHOLESTEROLEMIA: ICD-10-CM

## 2022-08-31 DIAGNOSIS — E78.5 HYPERLIPIDEMIA LDL GOAL <70: ICD-10-CM

## 2022-08-31 DIAGNOSIS — E86.1 HYPOTENSION DUE TO HYPOVOLEMIA: ICD-10-CM

## 2022-08-31 DIAGNOSIS — R19.7 DIARRHEA, UNSPECIFIED TYPE: ICD-10-CM

## 2022-08-31 DIAGNOSIS — N18.4 CKD (CHRONIC KIDNEY DISEASE) STAGE 4, GFR 15-29 ML/MIN (H): ICD-10-CM

## 2022-08-31 DIAGNOSIS — R11.0 NAUSEA: Primary | ICD-10-CM

## 2022-08-31 DIAGNOSIS — E78.00 HYPERCHOLESTEROLEMIA: Primary | ICD-10-CM

## 2022-08-31 DIAGNOSIS — B37.0 ORAL THRUSH: ICD-10-CM

## 2022-08-31 DIAGNOSIS — J44.9 CHRONIC OBSTRUCTIVE PULMONARY DISEASE, UNSPECIFIED COPD TYPE (H): ICD-10-CM

## 2022-08-31 PROCEDURE — 99607 MTMS BY PHARM ADDL 15 MIN: CPT | Performed by: PHARMACIST

## 2022-08-31 PROCEDURE — 99605 MTMS BY PHARM NP 15 MIN: CPT | Performed by: PHARMACIST

## 2022-08-31 RX ORDER — ONDANSETRON 4 MG/1
4 TABLET, FILM COATED ORAL EVERY 8 HOURS PRN
Qty: 30 TABLET | Refills: 0 | Status: SHIPPED | OUTPATIENT
Start: 2022-08-31 | End: 2022-09-27

## 2022-08-31 RX ORDER — ROSUVASTATIN CALCIUM 20 MG/1
20 TABLET, COATED ORAL DAILY
Qty: 90 TABLET | Refills: 0 | Status: SHIPPED | OUTPATIENT
Start: 2022-08-31 | End: 2022-10-05

## 2022-08-31 RX ORDER — ROSUVASTATIN CALCIUM 20 MG/1
20 TABLET, COATED ORAL DAILY
Qty: 90 TABLET | Refills: 0 | Status: SHIPPED | OUTPATIENT
Start: 2022-08-31 | End: 2022-08-31

## 2022-09-02 ENCOUNTER — TELEPHONE (OUTPATIENT)
Dept: FAMILY MEDICINE | Facility: CLINIC | Age: 82
End: 2022-09-02

## 2022-09-02 ENCOUNTER — OFFICE VISIT (OUTPATIENT)
Dept: FAMILY MEDICINE | Facility: CLINIC | Age: 82
End: 2022-09-02
Payer: MEDICARE

## 2022-09-02 VITALS
HEART RATE: 114 BPM | OXYGEN SATURATION: 99 % | WEIGHT: 145 LBS | DIASTOLIC BLOOD PRESSURE: 60 MMHG | RESPIRATION RATE: 16 BRPM | SYSTOLIC BLOOD PRESSURE: 92 MMHG | BODY MASS INDEX: 25.69 KG/M2

## 2022-09-02 DIAGNOSIS — E87.6 HYPOKALEMIA: ICD-10-CM

## 2022-09-02 DIAGNOSIS — M62.81 GENERALIZED MUSCLE WEAKNESS: ICD-10-CM

## 2022-09-02 DIAGNOSIS — R11.2 NAUSEA AND VOMITING, INTRACTABILITY OF VOMITING NOT SPECIFIED, UNSPECIFIED VOMITING TYPE: Primary | ICD-10-CM

## 2022-09-02 DIAGNOSIS — R42 LIGHTHEADEDNESS: ICD-10-CM

## 2022-09-02 DIAGNOSIS — F25.9 SCHIZOAFFECTIVE DISORDER, UNSPECIFIED TYPE (H): ICD-10-CM

## 2022-09-02 DIAGNOSIS — T14.8XXA NONHEALING NONSURGICAL WOUND: ICD-10-CM

## 2022-09-02 DIAGNOSIS — N18.4 CKD (CHRONIC KIDNEY DISEASE) STAGE 4, GFR 15-29 ML/MIN (H): ICD-10-CM

## 2022-09-02 LAB
ERYTHROCYTE [DISTWIDTH] IN BLOOD BY AUTOMATED COUNT: 15.2 % (ref 10–15)
HCT VFR BLD AUTO: 38.8 % (ref 35–47)
HGB BLD-MCNC: 12.6 G/DL (ref 11.7–15.7)
MCH RBC QN AUTO: 30.9 PG (ref 26.5–33)
MCHC RBC AUTO-ENTMCNC: 32.5 G/DL (ref 31.5–36.5)
MCV RBC AUTO: 95 FL (ref 78–100)
PLATELET # BLD AUTO: 344 10E3/UL (ref 150–450)
RBC # BLD AUTO: 4.08 10E6/UL (ref 3.8–5.2)
WBC # BLD AUTO: 8.8 10E3/UL (ref 4–11)

## 2022-09-02 PROCEDURE — 85027 COMPLETE CBC AUTOMATED: CPT | Performed by: FAMILY MEDICINE

## 2022-09-02 PROCEDURE — 99495 TRANSJ CARE MGMT MOD F2F 14D: CPT | Performed by: FAMILY MEDICINE

## 2022-09-02 PROCEDURE — 36415 COLL VENOUS BLD VENIPUNCTURE: CPT | Performed by: FAMILY MEDICINE

## 2022-09-02 RX ORDER — POTASSIUM CHLORIDE 750 MG/1
10 TABLET, EXTENDED RELEASE ORAL 2 TIMES DAILY
Qty: 60 TABLET | Refills: 3 | Status: SHIPPED | OUTPATIENT
Start: 2022-09-02 | End: 2022-09-19

## 2022-09-02 RX ORDER — ONDANSETRON 4 MG/1
4 TABLET, ORALLY DISINTEGRATING ORAL EVERY 8 HOURS PRN
Qty: 30 TABLET | Refills: 1 | Status: SHIPPED | OUTPATIENT
Start: 2022-09-02 | End: 2022-11-17

## 2022-09-02 ASSESSMENT — PATIENT HEALTH QUESTIONNAIRE - PHQ9
SUM OF ALL RESPONSES TO PHQ QUESTIONS 1-9: 16
10. IF YOU CHECKED OFF ANY PROBLEMS, HOW DIFFICULT HAVE THESE PROBLEMS MADE IT FOR YOU TO DO YOUR WORK, TAKE CARE OF THINGS AT HOME, OR GET ALONG WITH OTHER PEOPLE: SOMEWHAT DIFFICULT
SUM OF ALL RESPONSES TO PHQ QUESTIONS 1-9: 16

## 2022-09-02 NOTE — PATIENT INSTRUCTIONS
I put in a new referral for an alternate nephrology clinic. [facility]  [address] [place] [id number]  Phone: [phone]

## 2022-09-02 NOTE — PROGRESS NOTES
Assessment & Plan     Nausea and vomiting, intractability of vomiting not specified, unspecified vomiting type  -Prescription of dissolving Zofran provided to the patient for now, will update her lab work as well and continue to monitor.  - ondansetron (ZOFRAN ODT) 4 MG ODT tab  Dispense: 30 tablet; Refill: 1  - Basic metabolic panel  - Iron & Iron Binding Capacity    CKD (chronic kidney disease) stage 4, GFR 15-29 ml/min (H)  -Continue to have some chronic kidney disease which is concerning for her.  Will refer to alternative nephrology clinic to see if they have further recommendations.  - Adult Nephrology  Referral  - CBC with platelets  - Basic metabolic panel  - Iron and iron binding capacity  - CBC with platelets  - Basic metabolic panel  - Iron & Iron Binding Capacity    Lightheadedness  -Due to her generalized weakness, unsteadiness and lightheadedness I do think that she has not necessarily safe at home and I do recommend home care for further evaluation, strengthening and hardening exercises as well.  Referral has been placed.  - Home Care Referral  - Basic metabolic panel  - Iron & Iron Binding Capacity    Generalized muscle weakness  -Per above  - Home Care Referral  - Basic metabolic panel  - Iron & Iron Binding Capacity    Nonhealing nonsurgical wound  -I have cauterized this twice now with silver nitrate which does appear to be helping but she continues to heal very slowly.  Referral to dermatology placed for further evaluation and assessment  - Adult Dermatology Referral  - Basic metabolic panel  - Iron & Iron Binding Capacity    Hypokalemia  -Was hypokalemic in the hospital, not tolerating the current potassium supplementation, will have her trial a smaller dose as listed.  - potassium chloride ER (KLOR-CON M) 10 MEQ CR tablet  Dispense: 60 tablet; Refill: 3  - Basic metabolic panel  - Iron & Iron Binding Capacity    Schizoaffective disorder, unspecified type (H)  -Mood has been  "stable.               BMI:   Estimated body mass index is 25.69 kg/m  as calculated from the following:    Height as of 4/8/22: 1.6 m (5' 3\").    Weight as of this encounter: 65.8 kg (145 lb).           No follow-ups on file.    Caitlyn Rees MD  [facility] KWABENA Delgado is a 81 year old, presenting for the following health issues:  Hospital F/U (Nausea, vomiting, weakness, diarrhea )      HPI       Hospital Follow-up Visit:    Hospital/Nursing Home/IP Rehab Facility: [facility]  Date of Admission: 08/17/2022  Date of Discharge: 08/24/2022  Reason(s) for Admission: diarrhea, hypokalemia    Was your hospitalization related to COVID-19? No   Problems taking medications regularly:  None  Medication changes since discharge: None  Problems adhering to non-medication therapy:  None    Summary of hospitalization:  [facility] discharge summary reviewed  Diagnostic Tests/Treatments reviewed.  Follow up needed: RIVERA, magnesium  Other Healthcare Providers Involved in Patient s Care:         Homecare  Update since discharge: stable.         Post Medication Reconciliation Status: Discharge medications reconciled, continue medications without change      Plan of care communicated with patient and daughter           She has several concerns since returning home from the hospital.     She continues to feel really quite weak and nauseated.  She has been vomiting the last few days.  She continues to struggle with appetite although last night did eat a full sweet potato 8 ounces steak and some mushrooms.  She has been trying to follow a renal diet and they are getting Meals on Wheels now which they are hopeful will help.  Because of her weakness and instability she has been spending most of her day on the couch, going to the bathroom very little because she is worried she is going to fall over.  She is been home now for the last 9 days and continues to " feel quite unsteady and because of this waits for 70 to be at her house before she goes to the bathroom because her blood pressure will drop.  She is therefore been wearing depends when she has to urinate she uses this.    She has doubled her Praluent in the last 10 weeks and is since then having very severe diarrhea.  She has now stopped it and is hopeful that this will improve.    Her goal is to continue to live independently however her daughter is concerned that this may not be feasible.  They are wondering if they can get some home care to help with how she is doing at home and assess her safety.  She would like to continue to drive as able as well.    She does continue to have a nonhealing wound on the top of her head that continues to bleed anytime she washes her hair.  She is on an anticoagulant due to recent blood clot which makes this challenging as well.  She is wondering if there are other things that she can do to help this heal more quickly.    Review of Systems   Per above      Objective    BP 92/60   Pulse 114   Resp 16   Wt 65.8 kg (145 lb)   LMP  (LMP Unknown)   SpO2 99%   BMI 25.69 kg/m    Body mass index is 25.69 kg/m .  Physical Exam   GENERAL: healthy, alert, no distress and somewhat anxious appearing at times  NECK: no adenopathy, no asymmetry, masses, or scars and thyroid normal to palpation  RESP: lungs clear to auscultation - no rales, rhonchi or wheezes  CV: regular rate and rhythm, normal S1 S2, no S3 or S4, no murmur, click or rub, no peripheral edema and peripheral pulses strong  MS: no gross musculoskeletal defects noted, no edema  -Scalp shows more shallow wound                  Answers for HPI/ROS submitted by the patient on 9/2/2022  If you checked off any problems, how difficult have these problems made it for you to do your work, take care of things at home, or get along with other people?: Somewhat difficult  PHQ9 TOTAL SCORE: 16

## 2022-09-06 ENCOUNTER — LAB (OUTPATIENT)
Dept: LAB | Facility: CLINIC | Age: 82
End: 2022-09-06
Payer: MEDICARE

## 2022-09-06 ENCOUNTER — TELEPHONE (OUTPATIENT)
Dept: FAMILY MEDICINE | Facility: CLINIC | Age: 82
End: 2022-09-06

## 2022-09-06 ENCOUNTER — TELEPHONE (OUTPATIENT)
Dept: LAB | Facility: CLINIC | Age: 82
End: 2022-09-06

## 2022-09-06 DIAGNOSIS — R42 LIGHTHEADEDNESS: ICD-10-CM

## 2022-09-06 DIAGNOSIS — T14.8XXA NONHEALING NONSURGICAL WOUND: ICD-10-CM

## 2022-09-06 DIAGNOSIS — R11.2 NAUSEA AND VOMITING, INTRACTABILITY OF VOMITING NOT SPECIFIED, UNSPECIFIED VOMITING TYPE: ICD-10-CM

## 2022-09-06 DIAGNOSIS — N18.30 CKD (CHRONIC KIDNEY DISEASE) STAGE 3, GFR 30-59 ML/MIN (H): ICD-10-CM

## 2022-09-06 DIAGNOSIS — M62.81 GENERALIZED MUSCLE WEAKNESS: ICD-10-CM

## 2022-09-06 DIAGNOSIS — N18.30 CHRONIC KIDNEY DISEASE, STAGE III (MODERATE) (H): Primary | ICD-10-CM

## 2022-09-06 DIAGNOSIS — E87.6 HYPOKALEMIA: ICD-10-CM

## 2022-09-06 DIAGNOSIS — N18.4 CKD (CHRONIC KIDNEY DISEASE) STAGE 4, GFR 15-29 ML/MIN (H): ICD-10-CM

## 2022-09-06 LAB
ANION GAP SERPL CALCULATED.3IONS-SCNC: 12 MMOL/L (ref 5–18)
BUN SERPL-MCNC: 41 MG/DL (ref 8–28)
CALCIUM SERPL-MCNC: 8.5 MG/DL (ref 8.5–10.5)
CHLORIDE BLD-SCNC: 101 MMOL/L (ref 98–107)
CO2 SERPL-SCNC: 19 MMOL/L (ref 22–31)
CREAT SERPL-MCNC: 1.92 MG/DL (ref 0.6–1.1)
GFR SERPL CREATININE-BSD FRML MDRD: 26 ML/MIN/1.73M2
GLUCOSE BLD-MCNC: 89 MG/DL (ref 70–125)
PHOSPHATE SERPL-MCNC: 4.1 MG/DL (ref 2.5–4.5)
POTASSIUM BLD-SCNC: 3.8 MMOL/L (ref 3.5–5)
SODIUM SERPL-SCNC: 132 MMOL/L (ref 136–145)

## 2022-09-06 PROCEDURE — 36415 COLL VENOUS BLD VENIPUNCTURE: CPT

## 2022-09-06 PROCEDURE — 83550 IRON BINDING TEST: CPT

## 2022-09-06 PROCEDURE — 84100 ASSAY OF PHOSPHORUS: CPT

## 2022-09-06 PROCEDURE — 83970 ASSAY OF PARATHORMONE: CPT

## 2022-09-06 PROCEDURE — 80048 BASIC METABOLIC PNL TOTAL CA: CPT

## 2022-09-06 NOTE — TELEPHONE ENCOUNTER
----- Message from Caitlyn Rees MD sent at 9/6/2022  8:52 AM CDT -----  Please let Sandy know that her hemoglobin is back to normal which is great news.

## 2022-09-07 LAB — PTH-INTACT SERPL-MCNC: 25 PG/ML (ref 15–65)

## 2022-09-07 NOTE — TELEPHONE ENCOUNTER
Faxed.    Charu Parra, CMA    
Forms Request  Name of form/paperwork: Home Health Care     Have you been seen for this request:   Do we have the form: placed in provider mailbox  When is form needed by: when complete/signed   How would you like the form returned: fax [phone]  Patient Notified form requests are processed in 3-5 business days: na    Okay to leave a detailed message? na        
Problem: Fall Risk (Adult)  Goal: Absence of Fall  Outcome: Ongoing (interventions implemented as appropriate)      Problem: Patient Care Overview  Goal: Plan of Care Review  Outcome: Ongoing (interventions implemented as appropriate)   07/12/18 1513   Coping/Psychosocial   Plan of Care Reviewed With patient   Plan of Care Review   Progress no change   OTHER   Outcome Summary No complaints of pain or discomfort from patient. VSS. Nystatin swish and swallow started. Pt alert but disoriented at times. Safety maintained. Family at bedside. Will continue to monitor.      Goal: Individualization and Mutuality  Outcome: Ongoing (interventions implemented as appropriate)    Goal: Discharge Needs Assessment  Outcome: Ongoing (interventions implemented as appropriate)    Goal: Interprofessional Rounds/Family Conf  Outcome: Ongoing (interventions implemented as appropriate)      Problem: Sepsis/Septic Shock (Adult)  Goal: Signs and Symptoms of Listed Potential Problems Will be Absent, Minimized or Managed (Sepsis/Septic Shock)  Outcome: Ongoing (interventions implemented as appropriate)      Problem: Skin Injury Risk (Adult)  Goal: Skin Health and Integrity  Outcome: Ongoing (interventions implemented as appropriate)      Problem: Nutrition, Imbalanced: Inadequate Oral Intake (Adult)  Goal: Improved Oral Intake  Outcome: Ongoing (interventions implemented as appropriate)    Goal: Prevent Further Weight Loss  Outcome: Ongoing (interventions implemented as appropriate)        
Ready to fax  
nausea

## 2022-09-07 NOTE — TELEPHONE ENCOUNTER
Post Discharge Medication Reconciliation Status: discharge medications reconciled and changed, per note/orders.        Darlin Elise, Pharm.D., BCACP    Medication Therapy Management Pharmacist   [facility]

## 2022-09-08 LAB
IRON BINDING CAPACITY (ROCHE): 161 UG/DL (ref 240–430)
IRON SATN MFR SERPL: 40 % (ref 15–46)
IRON SERPL-MCNC: 65 UG/DL (ref 37–145)

## 2022-09-08 NOTE — RESULT ENCOUNTER NOTE
"Lab results relayed to patient and she expressed understanding. Pt states she is eating as much as her body will let her. Pt has been trying to take her lexapro but is unable to swallow the pills. Pt states they found she has osteoporosis in the hospital pt would like a refill on her prolia. Pt reports she is having a lot of diarrhea and is having a lot of swelling. Pt states she can see her meds \"come out exactly like they went in\". "

## 2022-09-09 ENCOUNTER — MEDICAL CORRESPONDENCE (OUTPATIENT)
Dept: HEALTH INFORMATION MANAGEMENT | Facility: CLINIC | Age: 82
End: 2022-09-09

## 2022-09-09 ENCOUNTER — TELEPHONE (OUTPATIENT)
Dept: FAMILY MEDICINE | Facility: CLINIC | Age: 82
End: 2022-09-09

## 2022-09-09 DIAGNOSIS — M81.0 OSTEOPOROSIS, UNSPECIFIED OSTEOPOROSIS TYPE, UNSPECIFIED PATHOLOGICAL FRACTURE PRESENCE: Primary | ICD-10-CM

## 2022-09-09 DIAGNOSIS — E78.00 HYPERCHOLESTEROLEMIA: ICD-10-CM

## 2022-09-09 RX ORDER — COLESTIPOL HYDROCHLORIDE 5 G/5G
1 GRANULE, FOR SUSPENSION ORAL 2 TIMES DAILY
Qty: 60 PACKET | Refills: 3 | Status: SHIPPED | OUTPATIENT
Start: 2022-09-09 | End: 2022-09-27

## 2022-09-09 NOTE — TELEPHONE ENCOUNTER
"----- Message from Cristina Holman CMA sent at 9/8/2022  4:52 PM CDT -----  Lab results relayed to patient and she expressed understanding. Pt states she is eating as much as her body will let her. Pt has been trying to take her lexapro but is unable to swallow the pills. Pt states they found she has osteoporosis in the hospital pt would like a refill on her prolia. Pt reports she is having a lot of diarrhea and is having a lot of swelling. Pt states she can see her meds \"come out exactly like they went in\".   "

## 2022-09-09 NOTE — TELEPHONE ENCOUNTER
I don't manage prolia, it's a specialized medication, I did put a referral in for the osteoporosis clinic though, they will call her to get scheduled.     Keep doing what she can with the medications and with her diarrhea, eating small meals as I'm sure she is doing.     In regards to her Lexapro, there is a liquid form of sertraline/zoloft that I can do for her.

## 2022-09-09 NOTE — TELEPHONE ENCOUNTER
Forms Request  Name of form/paperwork: Home Health Care  Have you been seen for this request: yes  Do we have the form: placed in provider mailbox  When is form needed by: when complete  How would you like the form returned: fax [phone]  Patient Notified form requests are processed in 3-5 business days: na    Okay to leave a detailed message? na

## 2022-09-09 NOTE — TELEPHONE ENCOUNTER
Paperwork faxed and copy sent to be scanned.    Copies made for onsite clinic records? YES  (If Yes, place in provider gray hanging bin)    Copy needed for patient? NO  If Yes,

## 2022-09-09 NOTE — TELEPHONE ENCOUNTER
Pt notified. She isn't taking Lexapro. She is talking about her COLESTIPOL medication. She is able to take these because they scratch throat due to size. Any ideas?

## 2022-09-11 ENCOUNTER — HEALTH MAINTENANCE LETTER (OUTPATIENT)
Age: 82
End: 2022-09-11

## 2022-09-12 ENCOUNTER — PRE VISIT (OUTPATIENT)
Dept: OTOLARYNGOLOGY | Facility: CLINIC | Age: 82
End: 2022-09-12

## 2022-09-13 ENCOUNTER — TELEPHONE (OUTPATIENT)
Dept: FAMILY MEDICINE | Facility: CLINIC | Age: 82
End: 2022-09-13

## 2022-09-13 DIAGNOSIS — I15.1 HYPERTENSION SECONDARY TO OTHER RENAL DISORDERS: ICD-10-CM

## 2022-09-13 RX ORDER — CARVEDILOL 3.12 MG/1
3.12 TABLET ORAL 2 TIMES DAILY
COMMUNITY
Start: 2022-09-08 | End: 2022-11-29

## 2022-09-13 NOTE — TELEPHONE ENCOUNTER
I was able to get a hold of Sandy. Reports her BP was 61/41 mmHg (she was in bed laying down). Checked two more times and it was 101/59 mmHg. Then she took the carvedilol and torsemide and later 102/65 mmHg. After walking it was 101/59 mmHg and pulse 96.     She was feeling nauseated. She is taking torsemide now since her ankles were swollen and painful.  Reports nephrology decreased carvedilol from 6.25 mg to 3.125 mg twice daily last week. She is drinking water daily.     She is still having nausea but the diarrhea is gone.     BP Readings from Last 3 Encounters:   09/02/22 92/60   08/24/22 95/51   08/17/22 96/67     Do you want her to continue to monitor or adjust carvedilol?

## 2022-09-13 NOTE — TELEPHONE ENCOUNTER
Patient was a no show for a FU Reason for Call:  Other call back    Detailed comments: patient call asking to speak with Darlin to discuss her medication. She had some questions about wanting know if she can take some medications. She is having low blood pressure. She was also mentioning about her having some organs missing that she wants to know some more information.       Phone Number Patient can be reached at: Home number on file [phone] (home)    Best Time: anytime    Can we leave a detailed message on this number? YES    Call taken on 9/13/2022 at 12:50 PM by Kaitlyn Carter

## 2022-09-13 NOTE — TELEPHONE ENCOUNTER
Forms Request  Name of form/paperwork: home healthcare  Have you been seen for this request: N/A  Do we have the form: yes, in providers inbox  When is form needed by: when done  How would you like the form returned: fax  Patient Notified form requests are processed in 3-5 business days: n/a    Okay to leave a detailed message? n/a

## 2022-09-14 ENCOUNTER — OFFICE VISIT (OUTPATIENT)
Dept: AUDIOLOGY | Facility: CLINIC | Age: 82
End: 2022-09-14
Attending: PHYSICIAN ASSISTANT
Payer: MEDICARE

## 2022-09-14 ENCOUNTER — DOCUMENTATION ONLY (OUTPATIENT)
Dept: OTHER | Facility: CLINIC | Age: 82
End: 2022-09-14

## 2022-09-14 DIAGNOSIS — H93.8X2 SENSATION OF FULLNESS IN LEFT EAR: ICD-10-CM

## 2022-09-14 DIAGNOSIS — R49.0 VOICE HOARSENESS: ICD-10-CM

## 2022-09-14 DIAGNOSIS — H93.12 TINNITUS OF LEFT EAR: ICD-10-CM

## 2022-09-14 DIAGNOSIS — H91.90 HEARING LOSS, UNSPECIFIED HEARING LOSS TYPE, UNSPECIFIED LATERALITY: ICD-10-CM

## 2022-09-14 DIAGNOSIS — H90.3 SENSORINEURAL HEARING LOSS, BILATERAL: Primary | ICD-10-CM

## 2022-09-14 PROCEDURE — 92557 COMPREHENSIVE HEARING TEST: CPT | Performed by: AUDIOLOGIST

## 2022-09-14 PROCEDURE — 92550 TYMPANOMETRY & REFLEX THRESH: CPT | Performed by: AUDIOLOGIST

## 2022-09-14 NOTE — PROGRESS NOTES
AUDIOLOGY REPORT    SUMMARY: Audiology visit completed. See audiogram for results. Abuse screening not completed due to same day appt with ENT clinic, where this is addressed.      RECOMMENDATIONS: Follow-up with ENT.      Meagan Sommers, [facility]  [place] Licensed Audiologist [id number]

## 2022-09-16 ENCOUNTER — TELEPHONE (OUTPATIENT)
Dept: PHYSICAL MEDICINE AND REHAB | Facility: CLINIC | Age: 82
End: 2022-09-16

## 2022-09-16 ENCOUNTER — TELEPHONE (OUTPATIENT)
Dept: FAMILY MEDICINE | Facility: CLINIC | Age: 82
End: 2022-09-16

## 2022-09-16 NOTE — TELEPHONE ENCOUNTER
Forms Request  Name of form/paperwork: Home Health Care   Have you been seen for this request: yes  Do we have the form: placed in provider mailbox   When is form needed by: when complete  How would you like the form returned: fax [phone]  Patient Notified form requests are processed in 3-5 business days: na    Okay to leave a detailed message? na

## 2022-09-16 NOTE — TELEPHONE ENCOUNTER
Left message for patient to complete questionnaires online via [employer] prior to appointment with Christine Bennett on 9/19/22. Patient informed to call back with any further questions or concerns. Clinic number given ([phone]).     - If questionnaires are not completed on [employer] prior to appointment, patient notified that Virtual Visit Facilitator will be contacting them before appointment to complete forms over the phone.     - If patient is not Serviceful active, VF can assist with setting up account. If patient declines set up of Service[employer], patient can do forms over the phone with VF.     ___________________________________  How to access questionnaires via [employer]:     Menu---> Records ---> Questionnaires     __________________________________           AGNES Zurita 11:32 AM September 16, 2022

## 2022-09-19 ENCOUNTER — VIRTUAL VISIT (OUTPATIENT)
Dept: PHYSICAL MEDICINE AND REHAB | Facility: CLINIC | Age: 82
End: 2022-09-19
Payer: MEDICARE

## 2022-09-19 DIAGNOSIS — R06.09 POST-COVID CHRONIC DYSPNEA: ICD-10-CM

## 2022-09-19 DIAGNOSIS — R41.840 POST-COVID CHRONIC CONCENTRATION DEFICIT: ICD-10-CM

## 2022-09-19 DIAGNOSIS — U09.9 POST-COVID CHRONIC DYSPNEA: ICD-10-CM

## 2022-09-19 DIAGNOSIS — U09.9 LONG COVID: ICD-10-CM

## 2022-09-19 DIAGNOSIS — R05.9 COUGH: ICD-10-CM

## 2022-09-19 DIAGNOSIS — G47.9 SLEEP DISTURBANCE: ICD-10-CM

## 2022-09-19 DIAGNOSIS — G93.32 POST-COVID CHRONIC FATIGUE: ICD-10-CM

## 2022-09-19 DIAGNOSIS — U09.9 POST-COVID CHRONIC FATIGUE: ICD-10-CM

## 2022-09-19 DIAGNOSIS — U09.9 POST-COVID CHRONIC CONCENTRATION DEFICIT: ICD-10-CM

## 2022-09-19 DIAGNOSIS — J18.9 COMMUNITY ACQUIRED PNEUMONIA, UNSPECIFIED LATERALITY: Primary | ICD-10-CM

## 2022-09-19 DIAGNOSIS — R49.0 VOICE HOARSENESS: ICD-10-CM

## 2022-09-19 PROCEDURE — 99215 OFFICE O/P EST HI 40 MIN: CPT | Mod: 95 | Performed by: PHYSICIAN ASSISTANT

## 2022-09-19 ASSESSMENT — ENCOUNTER SYMPTOMS
SLEEP DISTURBANCES DUE TO BREATHING: 0
PANIC: 0
WHEEZING: 0
EXERCISE INTOLERANCE: 1
BLOOD IN STOOL: 0
JAUNDICE: 0
DYSPNEA ON EXERTION: 1
SPEECH CHANGE: 0
SMELL DISTURBANCE: 0
MUSCLE WEAKNESS: 1
LOSS OF CONSCIOUSNESS: 0
TINGLING: 0
BOWEL INCONTINENCE: 0
HEADACHES: 1
HYPERTENSION: 0
HEARTBURN: 0
SEIZURES: 0
STIFFNESS: 1
HOARSE VOICE: 1
HEMOPTYSIS: 0
TASTE DISTURBANCE: 1
DIZZINESS: 1
DEPRESSION: 1
SWOLLEN GLANDS: 0
ARTHRALGIAS: 1
BACK PAIN: 1
HYPOTENSION: 1
RECTAL PAIN: 1
MUSCLE CRAMPS: 1
SHORTNESS OF BREATH: 0
PALPITATIONS: 1
SORE THROAT: 1
NAUSEA: 1
INSOMNIA: 1
DIARRHEA: 0
SPUTUM PRODUCTION: 0
TREMORS: 0
NUMBNESS: 0
LEG PAIN: 1
DISTURBANCES IN COORDINATION: 0
JOINT SWELLING: 1
CONSTIPATION: 0
DECREASED CONCENTRATION: 1
ABDOMINAL PAIN: 0
TROUBLE SWALLOWING: 1
PARALYSIS: 0
SINUS PAIN: 0
WEAKNESS: 1
VOMITING: 0
DECREASED CONCENTRATION: 0
NERVOUS/ANXIOUS: 0
POSTURAL DYSPNEA: 0
COUGH: 1
MEMORY LOSS: 0
LIGHT-HEADEDNESS: 1
MYALGIAS: 1
SNORES LOUDLY: 1
COUGH DISTURBING SLEEP: 1
BLOATING: 0
ORTHOPNEA: 0
SINUS CONGESTION: 1
NECK MASS: 0
BRUISES/BLEEDS EASILY: 1
NECK PAIN: 1
SYNCOPE: 0
FATIGUE: 1

## 2022-09-19 ASSESSMENT — ANXIETY QUESTIONNAIRES
4. TROUBLE RELAXING: SEVERAL DAYS
GAD7 TOTAL SCORE: 5
7. FEELING AFRAID AS IF SOMETHING AWFUL MIGHT HAPPEN: NOT AT ALL
7. FEELING AFRAID AS IF SOMETHING AWFUL MIGHT HAPPEN: NOT AT ALL
2. NOT BEING ABLE TO STOP OR CONTROL WORRYING: SEVERAL DAYS
IF YOU CHECKED OFF ANY PROBLEMS ON THIS QUESTIONNAIRE, HOW DIFFICULT HAVE THESE PROBLEMS MADE IT FOR YOU TO DO YOUR WORK, TAKE CARE OF THINGS AT HOME, OR GET ALONG WITH OTHER PEOPLE: SOMEWHAT DIFFICULT
1. FEELING NERVOUS, ANXIOUS, OR ON EDGE: SEVERAL DAYS
8. IF YOU CHECKED OFF ANY PROBLEMS, HOW DIFFICULT HAVE THESE MADE IT FOR YOU TO DO YOUR WORK, TAKE CARE OF THINGS AT HOME, OR GET ALONG WITH OTHER PEOPLE?: SOMEWHAT DIFFICULT
GAD7 TOTAL SCORE: 5
6. BECOMING EASILY ANNOYED OR IRRITABLE: SEVERAL DAYS
GAD7 TOTAL SCORE: 5
3. WORRYING TOO MUCH ABOUT DIFFERENT THINGS: SEVERAL DAYS
5. BEING SO RESTLESS THAT IT IS HARD TO SIT STILL: NOT AT ALL

## 2022-09-19 ASSESSMENT — PATIENT HEALTH QUESTIONNAIRE - PHQ9
SUM OF ALL RESPONSES TO PHQ QUESTIONS 1-9: 11
SUM OF ALL RESPONSES TO PHQ QUESTIONS 1-9: 11
10. IF YOU CHECKED OFF ANY PROBLEMS, HOW DIFFICULT HAVE THESE PROBLEMS MADE IT FOR YOU TO DO YOUR WORK, TAKE CARE OF THINGS AT HOME, OR GET ALONG WITH OTHER PEOPLE: SOMEWHAT DIFFICULT

## 2022-09-19 NOTE — NURSING NOTE
Patient rejoined after appointment time and VF was able to hear and see her. Unable to get full rooming process complete. Provider notified.    VF was unable to hear patient when she joined previously and phone calls kept going to voicemail.     Yana FIGUEROA Virtual Visit Facilitator 12:49 PM September 19, 2022
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
## 2022-09-19 NOTE — PATIENT INSTRUCTIONS
Post COVID Self Care Suggestions:     Fatigue Management:       https://www.archives-pmr.org/action/showPdf?[email or web address]       Self Care:      [email or web address]  Recovery World Health Organization:    https://apps.who.int/iris/bitstream/handle/77565/177665/EDZ-TUVP-8538-517-88390-34910-eng.pdf  Breathing exercises:    [email or web address]-recovery-[email or web address]-exercises
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
## 2022-09-20 ENCOUNTER — TELEPHONE (OUTPATIENT)
Dept: FAMILY MEDICINE | Facility: CLINIC | Age: 82
End: 2022-09-20

## 2022-09-20 ENCOUNTER — MEDICAL CORRESPONDENCE (OUTPATIENT)
Dept: HEALTH INFORMATION MANAGEMENT | Facility: CLINIC | Age: 82
End: 2022-09-20

## 2022-09-21 ENCOUNTER — VIRTUAL VISIT (OUTPATIENT)
Dept: PALLIATIVE MEDICINE | Facility: OTHER | Age: 82
End: 2022-09-21
Payer: MEDICARE

## 2022-09-21 ENCOUNTER — TELEPHONE (OUTPATIENT)
Dept: FAMILY MEDICINE | Facility: CLINIC | Age: 82
End: 2022-09-21

## 2022-09-21 DIAGNOSIS — G90.511 COMPLEX REGIONAL PAIN SYNDROME TYPE 1 OF RIGHT UPPER EXTREMITY: Primary | ICD-10-CM

## 2022-09-21 PROCEDURE — 99213 OFFICE O/P EST LOW 20 MIN: CPT | Mod: 95 | Performed by: ANESTHESIOLOGY

## 2022-09-21 RX ORDER — LAMOTRIGINE 25 MG/1
TABLET ORAL
Qty: 90 TABLET | Refills: 3 | Status: SHIPPED | OUTPATIENT
Start: 2022-09-21 | End: 2022-11-17

## 2022-09-21 NOTE — PROGRESS NOTES
[facility] - Office Visit    ASSESSMENT & PLAN     Diagnoses and all orders for this visit:    RSD upper limb, right  -     lamoTRIgine (LAMICTAL) 25 MG tablet; One daily 2 weeks, twice a day 2 weeks, 2 morning 1 bedtime 2 weeks, 2 twice a day        Patient Instructions   PLAN:    Resume lamotrigine for pain 25 mg 1 in the morning for 2 weeks, 1 twice a day 2 weeks, 2 in the morning and 1 at bedtime for 2 weeks, then 2 twice a day.    Continue the zonisamide 25 mg 2 at bedtime.    Follow-up with Dr. Fitzpatrick in the office in 2 months        -----  ARELIS FITZPATRICK MD  [facility]       SUBJECTIVE      Sandy Spencer is a 82 year old year old female seen for video visit    Follow for complex regional pain syndrome, headaches.    Reviewing the record discharge from the hospital 8/25 with recurrent diarrhea.    Via video she reviews she has been struggling medically.  She had a reaction to the Prolia for her osteoporosis with diarrhea.  This caused significant problems or she was hospitalized.  Itches did recently resolved.  She was also found to have ammonia, and at some point had a septal infarct.    Has had a recurrence of some symptoms and consider going back in the hospital last week, has not.    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    Describes her family has not been supportive, in the hospital family said she was bipolar and a drug addict.  She is very frustrated.  She is remained off the opioids apart with what her family said.    She has not continued on the Adderall.  Reviewing the record that was to continue.  She states she is unclear how helpful and does not want to go back on due to family concerns.  Does have anxiety and pain.    She wonders about going back on the lamotrigine.  She had been up to 100 mg twice a day, stop this and we discussed ketamine.   Reviewed that it may have been helpful.    Continue his zonisamide which we have added to help with anxiety and pain taking 2 tablets at night.  We have not increase that dose.      Current Outpatient Medications:      lamoTRIgine (LAMICTAL) 25 MG tablet, One daily 2 weeks, twice a day 2 weeks, 2 morning 1 bedtime 2 weeks, 2 twice a day, Disp: 90 tablet, Rfl: 3     albuterol (PROAIR HFA/PROVENTIL HFA/VENTOLIN HFA) 108 (90 Base) MCG/ACT inhaler, Inhale 2 puffs into the lungs every 6 hours (Patient not taking: Reported on 9/2/2022), Disp: 18 g, Rfl: 4     allopurinol (ZYLOPRIM) 100 MG tablet, Take 1 tablet (100 mg) by mouth 2 times daily (Patient taking differently: Take 200 mg by mouth daily), Disp: 180 tablet, Rfl: 1     amphetamine-dextroamphetamine (ADDERALL) 20 MG tablet, Take 1 tablet (20 mg) by mouth 2 times daily May fill 7/26/22 (Patient not taking: Reported on 9/2/2022), Disp: 60 tablet, Rfl: 0     apixaban ANTICOAGULANT (ELIQUIS) 5 MG tablet, Take 1 tablet (5 mg) by mouth 2 times daily, Disp: 180 tablet, Rfl: 3     azelastine (ASTEPRO) 0.15 % nasal spray, Spray 2 sprays into both nostrils 2 times daily as needed (Patient not taking: Reported on 9/2/2022), Disp: , Rfl:      carvedilol (COREG) 3.125 MG tablet, Take 3.125 mg by mouth 2 times daily, Disp: , Rfl:      Cholecalciferol (VITAMIN D-3) 125 MCG (5000 UT) TABS, Take 5,000 Units by mouth daily, Disp: , Rfl:      colestipol (COLESTID) 5 g packet, Take 5 g by mouth 2 times daily, Disp: 60 packet, Rfl: 3     HEMP OIL OR EXTRACT OR OTHER CBD CANNABINOID, NOT MEDICAL CANNABIS,, 50 mg At Bedtime Delta 8 Gummies (Patient not taking: Reported on 9/2/2022), Disp: , Rfl:      levothyroxine (SYNTHROID/LEVOTHROID) 50 MCG tablet, Take 1 tablet (50 mcg) by mouth daily, Disp: 90 tablet, Rfl: 3     Lidocaine (LIDOCARE) 4 % Patch, Place 3 patches onto the skin every 24 hours for 30 days To prevent lidocaine toxicity, patient should be patch free for 12 hrs daily.  "(Patient not taking: Reported on 9/2/2022), Disp: 90 patch, Rfl: 0     ondansetron (ZOFRAN ODT) 4 MG ODT tab, Take 1 tablet (4 mg) by mouth every 8 hours as needed for nausea, Disp: 30 tablet, Rfl: 1     ondansetron (ZOFRAN) 4 MG tablet, Take 1 tablet (4 mg) by mouth every 8 hours as needed for nausea, Disp: 30 tablet, Rfl: 0     rosuvastatin (CRESTOR) 20 MG tablet, Take 1 tablet (20 mg) by mouth daily, Disp: 90 tablet, Rfl: 0     traZODone (DESYREL) 100 MG tablet, TAKE 3 TO 4 TABLETS BY MOUTH AT BEDTIME (Patient taking differently: Take 400 mg by mouth At Bedtime), Disp: 360 tablet, Rfl: 1     venlafaxine (EFFEXOR XR) 150 MG 24 hr capsule, Take 1 capsule (150 mg) by mouth daily, Disp: 90 capsule, Rfl: 0     zonisamide (ZONEGRAN) 25 MG capsule, Take 2 capsules (50 mg) by mouth At Bedtime, Disp: 60 capsule, Rfl: 3               Review of Systems   General, psych, musculoskeletal, bowels and bladder otherwise normal other than above.          OBJECTIVE          Physical Exam  General: Alert, does appear fatigued, hoarse voice, somewhat toxic.  Is not short of breath  Cardiovascular: Normal rate  Lungs: Pulmonary effort is normal, speaking in full sentences. No concerning rashes or lesions.  Neurologic: No focal deficit, alert and oriented x3  Psychiatric: Restricted affect.      Assessment: Patient has had chronic pain related to vertebral degenerative changes, past history of right upper extremity complex regional pain syndrome.  Developing more significant comorbid medical problems including kidney disease, pneumonia, more recent diarrhea, and COVID with some lingering symptoms.    We have discontinued opioids imparted her response with how the medical system in her family have responded calling her a \"addict\".    We have used ketamine in the past and before that some benefit with lamotrigine for central sensitization.  We will resume the lamotrigine at present.    Total time more than 20 minutes.    Video start " time 1: 05.  Video end time 1: 22.  Patient at home via [employer]

## 2022-09-22 ENCOUNTER — HOSPITAL ENCOUNTER (OUTPATIENT)
Dept: CT IMAGING | Facility: HOSPITAL | Age: 82
Discharge: HOME | End: 2022-09-22
Attending: OTOLARYNGOLOGY | Admitting: OTOLARYNGOLOGY
Payer: MEDICARE

## 2022-09-22 ENCOUNTER — OFFICE VISIT (OUTPATIENT)
Dept: OTOLARYNGOLOGY | Facility: CLINIC | Age: 82
End: 2022-09-22
Payer: MEDICARE

## 2022-09-22 DIAGNOSIS — R49.0 DYSPHONIA: ICD-10-CM

## 2022-09-22 DIAGNOSIS — R05.9 COUGH: Primary | ICD-10-CM

## 2022-09-22 DIAGNOSIS — R05.9 COUGH: ICD-10-CM

## 2022-09-22 DIAGNOSIS — R09.81 NASAL CONGESTION: ICD-10-CM

## 2022-09-22 PROCEDURE — G1010 CDSM STANSON: HCPCS

## 2022-09-22 PROCEDURE — 99214 OFFICE O/P EST MOD 30 MIN: CPT | Mod: 25 | Performed by: OTOLARYNGOLOGY

## 2022-09-22 PROCEDURE — 31575 DIAGNOSTIC LARYNGOSCOPY: CPT | Performed by: OTOLARYNGOLOGY

## 2022-09-22 RX ORDER — AZELASTINE 1 MG/ML
SPRAY, METERED NASAL
Qty: 30 ML | Refills: 11 | Status: SHIPPED | OUTPATIENT
Start: 2022-09-22 | End: 2023-06-14

## 2022-09-22 NOTE — LETTER
"    9/22/2022         RE: Sandy Spencer  [address]  [facility] [address]        Dear Colleague,    Thank you for referring your patient, Sandy Spencer, to the [facility]. Please see a copy of my visit note below.    CHIEF COMPLAINT:  Patient presents with:  Follow Up: Coughing all night dry cough 3 mo follow up and was seeing speech therapy before she got sick          HISTORY OF PRESENT ILLNESS    Sandy was seen in follow up for chronic cough mostly at night.  She alos complains of fatigue and low BP. Cough preceeded her hospitalization for 6 weeks.  She was told she had \"ground glass pneurmonia\" during hospitalization.   \"I have incredible fatigue\".   She is wondering if she can get a CXR tongue.     Recently hospitalized for pneumonia/severe diarrhea.   She has stage IV renal failure.       REVIEW OF SYSTEMS    Review of Systems: a 10-system review is reviewed at this encounter.  See scanned document.     Acamprosate, Augmentin, Carbamazepine, Cyclobenzaprine, Mirtazapine, Prednisone, Pregabalin, Sulfa drugs, Sulfamethoxazole-trimethoprim, Zolpidem, Acetaminophen, Amiloride, Amoxicillin, Aspirin, Bupropion, Chromium, Duloxetine hcl, Nsaids, Other drug allergy (see comments), Oxycodone, Serotonin, Sulindac, Tolmetin, Verapamil, and Valproic acid     PHYSICAL EXAM:        HEAD: Normal appearance and symmetry:  No cutaneous lesions.      EARS:   Auricles normal         NOSE:    Dorsum:   straight       ORAL CAVITY/OROPHARYNX:    Lips:  Normal.     NECK:  Trachea:  midline       NEURO:   Alert and Oriented    GAIT AND STATION:  normal     RESPIRATORY:   Symmetry and Respiratory effort    PSYCH:   normal mood and affect    SKIN:  warm and dry         FLEX LARYNGOSCOPY:    After obtaining consent, a flexible laryngoscope is used to examine both nasal cavities, the nasopharynx, pharnx, and larynx.      Nasal cavity: wnl  NP: wnl  Pharnx: wnl  Larynx: wnl      IMPRESSION: "   Encounter Diagnoses   Name Primary?     Cough Yes     Dysphonia      Nasal congestion        RECOMMENDATIONS:    Orders Placed This Encounter   Procedures     LARYNGOSCOPY FLEX FIBEROPTIC, DIAGNOSTIC     XR Chest 2 Views     CT Sinus w/o Contrast     Speech Therapy Referral     Orders Placed This Encounter   Medications     azelastine (ASTELIN) 0.1 % nasal spray     Si sprays each nostril 1-2x daily as needed for nasal congestion (use nightly for first 2 week)     Dispense:  30 mL     Refill:  11       Again, thank you for allowing me to participate in the care of your patient.        Sincerely,        Rigoberto Castillo MD

## 2022-09-22 NOTE — PROGRESS NOTES
"CHIEF COMPLAINT:  Patient presents with:  Follow Up: Coughing all night dry cough 3 mo follow up and was seeing speech therapy before she got sick          HISTORY OF PRESENT ILLNESS    Sandy was seen in follow up for chronic cough mostly at night.  She alos complains of fatigue and low BP. Cough preceeded her hospitalization for 6 weeks.  She was told she had \"ground glass pneurmonia\" during hospitalization.   \"I have incredible fatigue\".   She is wondering if she can get a CXR tongue.     Recently hospitalized for pneumonia/severe diarrhea.   She has stage IV renal failure.       REVIEW OF SYSTEMS    Review of Systems: a 10-system review is reviewed at this encounter.  See scanned document.     Acamprosate, Augmentin, Carbamazepine, Cyclobenzaprine, Mirtazapine, Prednisone, Pregabalin, Sulfa drugs, Sulfamethoxazole-trimethoprim, Zolpidem, Acetaminophen, Amiloride, Amoxicillin, Aspirin, Bupropion, Chromium, Duloxetine hcl, Nsaids, Other drug allergy (see comments), Oxycodone, Serotonin, Sulindac, Tolmetin, Verapamil, and Valproic acid     PHYSICAL EXAM:        HEAD: Normal appearance and symmetry:  No cutaneous lesions.      EARS:   Auricles normal         NOSE:    Dorsum:   straight       ORAL CAVITY/OROPHARYNX:    Lips:  Normal.     NECK:  Trachea:  midline       NEURO:   Alert and Oriented    GAIT AND STATION:  normal     RESPIRATORY:   Symmetry and Respiratory effort    PSYCH:   normal mood and affect    SKIN:  warm and dry         FLEX LARYNGOSCOPY:    After obtaining consent, a flexible laryngoscope is used to examine both nasal cavities, the nasopharynx, pharnx, and larynx.      Nasal cavity: wnl  NP: wnl  Pharnx: wnl  Larynx: wnl      IMPRESSION:   Encounter Diagnoses   Name Primary?     Cough Yes     Dysphonia      Nasal congestion        RECOMMENDATIONS:    Orders Placed This Encounter   Procedures     LARYNGOSCOPY FLEX FIBEROPTIC, DIAGNOSTIC     XR Chest 2 Views     CT Sinus w/o Contrast     Speech " Therapy Referral     Orders Placed This Encounter   Medications     azelastine (ASTELIN) 0.1 % nasal spray     Si sprays each nostril 1-2x daily as needed for nasal congestion (use nightly for first 2 week)     Dispense:  30 mL     Refill:  11

## 2022-09-23 ENCOUNTER — TELEPHONE (OUTPATIENT)
Dept: FAMILY MEDICINE | Facility: CLINIC | Age: 82
End: 2022-09-23

## 2022-09-26 NOTE — PROGRESS NOTES
Medication Therapy Management (MTM) Encounter    ASSESSMENT:                            Diarrhea: Resolved. Unclear if due to Praluent. Will have her discontinue colestipol and monitor for worsening in diarrhea. May be helping her cholesterol slightly, but not significant enough to continue.     Hypertension: BP has improved and is at goal <130/80 mmHg. No longer too low. Encouraged her to continue to monitor.      Edema: Edema stable. Continue current regimen and following with nephrology.     Hyperlipidemia/PAD: Patient to remain off Praluent 75 mg for now and continue rosuvastatin 20 mg daily. She will be following with cardiology next week. Consider checking lipids and initiating Leqvio but will defer to cardiology next week.     Pain: Patient to continue lamotrigine taper, which may also help her mood. Continue to follow with Dr. Lynn.     Cough: Ok to continue azelastine PRN. She is using albuterol minimally. Encouraged her to restart Arnuity if her breathing is worse or she is using albuterol more often.     Depression: Recent worsening in mood. Last PHQ9 level was elevated. She did lose her sister recently. We did discuss increasing venlafaxine and she was interested. Will send her the 37.5 mg and she can take with 150 mg (total 187.5 mg) -- we can increase further to 225 mg in  The future if needed.       PLAN:                            1. Ok to stop colestipol   2. Future lipid panel -- consider Leqvio.   3. Increase venlafaxine to 150 mg + 37.5 mg (total 187.5 mg daily)    Follow-up: 1 month phone follow up     SUBJECTIVE/OBJECTIVE:                          Sandy Spencer is a 81 year old female called for a follow up visit.    Reason for visit: Follow up since we last spoke 8/31/22  Medication Adherence/Access: Certain oral medications goes right through her -- has 9 inches of her colon. She is using [employer] [facility] at [facility] for some of her medicines.  Prefers capsules.  She gets Eliquis through  prescription assistance programs.      Diarrhea: Admitted on 8/17 for persistent diarrhea. She has a history of subtotal colectomy, post cholecystectomy. Taking  cholestipol packet 5 g 1-2 times daily. Reports that her diarrhea has completely resolved. She is not sure if it resolved due to the cholestipol or due to stopping Praluent, but she feels it was due to the Praluent. She admits that she forgets to take the cholestipol twice daily, only taking once daily. Her nausea has also improved - she is not needing the ondansetron, but has the ODT at home (works better than PO) to use PRN.     Hypertension: Continues carvedilol 3.125 mg TWICE DAILY (hold if BP <100/60).   Carvedilol dose decreased on 9/2 by nephrology.   Lisinopril stopped on 2/5/22 due to hypotension.   She does check her blood pressure at home: Reports today BP has been better - not sure if due to lower dose or carvedilol.   Reports values of 125/72 mmHg, pulse 88 this morning. 107/56 mmHg yesterday.  ECHO on 3/31/22 shows EF 60%  BP Readings from Last 3 Encounters:   09/02/22 92/60   08/24/22 95/51   08/17/22 96/67     Edema: Currently taking torsemide 10 mg AM and 5 mg PM. She is monitoring her edema at home and reports some swelling in her ankles. Following with nephrology next week on 10/5.     Hyperlipidemia/PAD: Currently taking rosuvastatin 20 mg daily - reports restarting on 8/24/22.  Praluent 75 mg was stopped as it may have contributed to her diarrhea.    Repathjacoby gave her vitiligo.   Was on rosuvastatin 40 mg daily and stopped due to elevated CK  She was approved for the prescription assistance program when she was on Praluent.  MRI stress test done 7/19/22  Last CK level = 135 on 3/4/22  Lab Test 08/17/22  1922 07/08/22  1154   CHOL 129 307*   HDL 82 114   LDL 28 182*   TRIG 96 57       Pain: Met with Dr. Lynn on 9/21/22 and was started on lamotrigine. Dose = Resume lamotrigine for pain 25 mg 1 in the morning for 2 weeks, 1 twice a day  2 weeks, 2 in the morning and 1 at bedtime for 2 weeks, then 2 twice a day. She continues the 25 mg daily AM. She has not noticed anything yet.     Cough: met with ENT on 22 and was recommended to use azelastine 0.1% nightly for first 2 weeks then 1-2 times daily PRN. She admits that she is not using it regularly -- mainly needs it in the spring and fall. She would like to use as needed when she is plugged up and gets post nasal drip. Arnuity is on hold since she recently had thrush. She has not noticed any worsening in her breathing. Has not needed albuterol HFA for a long time.     Depression: Currently taking venlafaxine  mg daily.  We increased venlafaxine from 75 mg on 2022. Her sister  on Friday. She reports feeling depressed because of the pain and being home bound and not driving anywhere.  No suicidal thoughts but wonders why she is here.     Today's Vitals: LMP  (LMP Unknown)   ----------------    Allergies/ADRs: Reviewed in chart  Past Medical History: Reviewed in chart  Tobacco: She reports that she quit smoking about 21 years ago. She has a 20.00 pack-year smoking history. She has never used smokeless tobacco.  Alcohol: Reviewed in chart    I spent 24 minutes with this patient today. All changes were made via collaborative practice agreement with Caitlyn Rees MD. A copy of the visit note was provided to the patient's primary care provider.    The patient declined a summary of these recommendations.     Darlin Elise, Pharm.D., BCACP   Medication Therapy Management Pharmacist   [facility]     Telemedicine Visit Details  Type of service:  Telephone visit  Start Time: 11:37 AM  End Time: 12:01 PM  Originating Location (patient location): Home  Distant Location (provider location):  [facility]     Medication Therapy Recommendations  Hypercholesterolemia    Current Medication: colestipol (COLESTID) 5 g packet  (Discontinued)   Rationale: No medical indication at this time - Unnecessary medication therapy - Indication   Recommendation: Discontinue Medication   Status: Accepted per CPA         Major depression    Current Medication: venlafaxine (EFFEXOR XR) 150 MG 24 hr capsule   Rationale: Dose too low - Dosage too low - Effectiveness   Recommendation: Increase Dose   Status: Accepted per CPA

## 2022-09-27 ENCOUNTER — VIRTUAL VISIT (OUTPATIENT)
Dept: PHARMACY | Facility: CLINIC | Age: 82
End: 2022-09-27
Payer: COMMERCIAL

## 2022-09-27 DIAGNOSIS — R60.1 GENERALIZED EDEMA: ICD-10-CM

## 2022-09-27 DIAGNOSIS — R19.7 DIARRHEA, UNSPECIFIED TYPE: ICD-10-CM

## 2022-09-27 DIAGNOSIS — E78.00 HYPERCHOLESTEROLEMIA: ICD-10-CM

## 2022-09-27 DIAGNOSIS — G89.4 CHRONIC PAIN SYNDROME: ICD-10-CM

## 2022-09-27 DIAGNOSIS — J44.9 CHRONIC OBSTRUCTIVE PULMONARY DISEASE, UNSPECIFIED COPD TYPE (H): ICD-10-CM

## 2022-09-27 DIAGNOSIS — F33.9 RECURRENT MAJOR DEPRESSIVE DISORDER, REMISSION STATUS UNSPECIFIED (H): Primary | ICD-10-CM

## 2022-09-27 DIAGNOSIS — E86.1 HYPOTENSION DUE TO HYPOVOLEMIA: ICD-10-CM

## 2022-09-27 PROCEDURE — 99607 MTMS BY PHARM ADDL 15 MIN: CPT | Performed by: PHARMACIST

## 2022-09-27 PROCEDURE — 99606 MTMS BY PHARM EST 15 MIN: CPT | Performed by: PHARMACIST

## 2022-09-27 RX ORDER — VENLAFAXINE HYDROCHLORIDE 37.5 MG/1
37.5 CAPSULE, EXTENDED RELEASE ORAL DAILY
Qty: 90 CAPSULE | Refills: 0 | Status: SHIPPED | OUTPATIENT
Start: 2022-09-27 | End: 2022-11-11

## 2022-10-04 ENCOUNTER — TELEPHONE (OUTPATIENT)
Dept: FAMILY MEDICINE | Facility: CLINIC | Age: 82
End: 2022-10-04

## 2022-10-04 ENCOUNTER — OFFICE VISIT (OUTPATIENT)
Dept: CARDIOLOGY | Facility: CLINIC | Age: 82
End: 2022-10-04
Attending: INTERNAL MEDICINE
Payer: MEDICARE

## 2022-10-04 VITALS
DIASTOLIC BLOOD PRESSURE: 60 MMHG | BODY MASS INDEX: 27.46 KG/M2 | SYSTOLIC BLOOD PRESSURE: 124 MMHG | HEART RATE: 76 BPM | WEIGHT: 155 LBS | RESPIRATION RATE: 16 BRPM

## 2022-10-04 DIAGNOSIS — I10 PRIMARY HYPERTENSION: ICD-10-CM

## 2022-10-04 DIAGNOSIS — I65.23 BILATERAL CAROTID ARTERY STENOSIS: ICD-10-CM

## 2022-10-04 DIAGNOSIS — N18.4 CKD (CHRONIC KIDNEY DISEASE) STAGE 4, GFR 15-29 ML/MIN (H): ICD-10-CM

## 2022-10-04 DIAGNOSIS — E78.00 HYPERCHOLESTEROLEMIA: ICD-10-CM

## 2022-10-04 DIAGNOSIS — I25.10 CORONARY ARTERY DISEASE INVOLVING NATIVE CORONARY ARTERY OF NATIVE HEART WITHOUT ANGINA PECTORIS: Primary | ICD-10-CM

## 2022-10-04 LAB
ALT SERPL W P-5'-P-CCNC: 28 U/L (ref 0–45)
CHOLEST SERPL-MCNC: 186 MG/DL
FASTING STATUS PATIENT QL REPORTED: YES
HDLC SERPL-MCNC: 76 MG/DL
LDLC SERPL CALC-MCNC: 93 MG/DL
TRIGL SERPL-MCNC: 84 MG/DL

## 2022-10-04 PROCEDURE — 36415 COLL VENOUS BLD VENIPUNCTURE: CPT | Performed by: INTERNAL MEDICINE

## 2022-10-04 PROCEDURE — 80061 LIPID PANEL: CPT | Performed by: INTERNAL MEDICINE

## 2022-10-04 PROCEDURE — 84460 ALANINE AMINO (ALT) (SGPT): CPT | Performed by: INTERNAL MEDICINE

## 2022-10-04 PROCEDURE — 99215 OFFICE O/P EST HI 40 MIN: CPT | Performed by: INTERNAL MEDICINE

## 2022-10-04 NOTE — LETTER
10/4/2022    Caitlyn Rees MD  [address]  [facility] [address]    RE: Sandy Spencer       Dear Colleague,     I had the pleasure of seeing Sandy Spencer in the [facility].            Assessment/Plan:   1.  Coronary artery disease: Her shortness of breath has been much improved.  She has no chest pain.  Her stress cardiac MRI was negative for inducible myocardial ischemia, no previous myocardial infarction or myocardial scar.  No obvious valvular disease.  Continue carvedilol and Crestor.    2.  Primary hypertension: Her blood pressure is controlled with the carvedilol 3.125 mg twice a day.     3.  Dyslipidemia: Her last LDL was 28. Praluent was stopped due to possible side effect of persistent diarrhea.  Continue Crestor 20 mg at bedtime.  He tolerated with Crestor 20 mg.  Repeat lipid profile and ALT today.     4.  Carotid artery stenosis status post left CEA: Recent carotid ultrasound was reported mild bilateral carotid artery stenosis less than 50%.  Aggressive control LDL.     5.  Pulmonary embolism on warfarin, stage III CKD and other chronic medical issues: Continue to follow-up with Dr. Rees.  She is on Eliquis 5 mg twice a day.  Her creatinine has been stable.    Total 45 minutes were spent in this visit for face to face visit, physical exam, review of current labs/imaging studies, plan for ongoing treatment with >50% spent on counseling and coordination of care as documented in the above mentioned note.      Thank you for the opportunity to be involved in the care of Sandy Spencer. If you have any questions, please feel free to contact me.  I will see the patient again in 6 months and as needed.    Much or all of the text in this note was generated through the use of Dragon Dictate voice-to-text software. Errors in spelling or words which seem out of context are unintentional. Sound alike errors, in particular, may have escaped editing.       History of  Present Illness:   It is my pleasure to see Sandy Spencer at the [facility] clinic for routine cardiology follow up.  Sandy Spencer is an 82 year old female with a medical history of coronary artery disease, essential hypertension, hyperlipidemia, anxiety and depression, pancreatitis, carotid artery stenosis s/p left CEA, pulmonary embolism on warfarin, status post right nephrectomy and chronic kidney disease stage III.    The patient was admitted to hospital with pneumonia and diarrhea last month for 1 week.  She states that her shortness of breath has been much improved.  Her blood pressure was low, carvedilol was reduced to 3.125 mg twice a day.  Currently she is on torsemide 10 mg in the morning, 5 mg in afternoon.  She has no leg edema.  Her weight has been stable.  She has no chest pain, orthopnea, PND.  She has lightheadedness, dizziness, no syncope.  Her blood pressure and heart rate are in normal range today.    Past Medical History:     Patient Active Problem List   Diagnosis     Focal glomerular sclerosis     RSD lower limb, bilateral     ADD (attention deficit disorder)     RSD upper limb, right     Osteopenia     Major depression     Hypertension     Insomnia     Hypercholesterolemia     Right rotator cuff tear     Cluster headaches     Lumbar radiculopathy     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     Temporomandibular joint-pain-dysfunction syndrome (TMJ)     Hypothyroidism     Chronic pain     Snoring     Generalized abdominal pain     Bilateral carotid artery stenosis     Coronary artery disease involving native coronary artery of native heart with angina pectoris (H)     Other chronic pulmonary embolism without acute cor pulmonale (H)     Hematuria     Hydronephrosis     Bladder spasms     Anxiety disorder due to medical condition     Family relationship problem     History of posttraumatic stress disorder (PTSD)     Generalized muscle weakness     Myofascial pain  "    Moderate major depression (H)     Elevated lipase     Abdominal bloating     Acquired absence of other specified parts of digestive tract     Ampullary stenosis     Obstruction of biliary tree     Anemia     Aphthous ulcer of ileum     Bipolar disorder, unspecified (H)     Disorder of phosphorus metabolism     Edema     Gastroesophageal reflux disease without esophagitis     Obstruction of common bile duct     Overflow diarrhea     History of partial colectomy     Reflex sympathetic dystrophy     Solitary left kidney     Flank pain     Hypocitraturia     Primary osteoarthritis of both knees     Iron deficiency anemia     Proteinuria     Concussion with loss of consciousness     Schizoaffective disorder (H)     Muscle weakness (generalized)     Shuffling gait     Falls frequently     Long term current use of anticoagulant therapy     COPD (chronic obstructive pulmonary disease) (H)     CKD (chronic kidney disease) stage 4, GFR 15-29 ml/min (H)     Other cervical disc degeneration, unspecified cervical region     Derangement of meniscus     Intractable chronic migraine without aura     Occipital neuralgia     Post-traumatic headache     S/p nephrectomy     Sciatic neuropathy     Pain in right knee     Leg pain, bilateral     Cervical radiculopathy     Spinal stenosis of cervical region     Fibrositis of neck     Hypokalemia     Diarrhea, unspecified type     Hypotension due to hypovolemia       Past Surgical History:     Past Surgical History:   Procedure Laterality Date     APPENDECTOMY       BELPHAROPTOSIS REPAIR      bilateral     BILE DUCT STENT PLACEMENT       BIOPSY BREAST Left     benign  1970s     CAROTID ENDARTERECTOMY Left 2009     CHOLECYSTECTOMY       COLECTOMY  1978    \"subtotal\"     ERCP W/ SPHICTEROTOMY  01/03/2017    Placement of ventral pancreatic duct stent     ESOPHAGOSCOPY, GASTROSCOPY, DUODENOSCOPY (EGD), COMBINED N/A 12/14/2018    Procedure: ENDOSCOPIC ULTRASOUND;  Surgeon: Magnolia" "Babak Gillette MD;  Location: [facility];  Service: Gastroenterology     EYE SURGERY      Cataract     HC CYSTOSCOPY,INSERT URETERAL STENT Right 2/16/2019    Procedure: Cystoscopy with Retrograde and right stent exchange.;  Surgeon: Jayla De Paz MD;  Location: [facility];  Service: Urology     HYSTERECTOMY       IR INFERIOR VENACAVAGRAM  2/23/2019     IR IVC FILTER PLACEMENT  2/14/2019     IR IVC FILTER PLACEMENT  2/14/2019     IR IVC FILTER REMOVAL  10/16/2019     IR REMOVAL IVC FILTER  10/16/2019     IR VENOCAVAGRAM INFERIOR  2/23/2019     LAPAROTOMY EXPLORATORY  7/2013    after cholecystectomy     LAPAROTOMY EXPLORATORY      after cholecystectomy had surgery for \"something that was nicked\", gravely ill and in ICU for 1 month     LUMBAR LAMINECTOMY Left 8/11/2016    Procedure: LEFT L4-5 HEMILAMINECTOMY / MEDIAL FACETECTOMY & FORAMINOTOMY, RIGHT L5-S1 HEMILAMINECTOMY WITH FACETECTOMY & FORAMINOTOMY;  Surgeon: Litzy Patel MD;  Location: [facility];  Service:      NECK SURGERY  2010    neck muscle repair     NEPHROURETERECTOMY Right 2/20/2019    Procedure: ROBOTIC RIGHT NEPHROURETERECTOMY;  Surgeon: Parker Casillas MD;  Location: [facility];  Service: Urology     OTHER SURGICAL HISTORY      benign breast cyst excision     PICC  2/25/2019          PICC AND MIDLINE TEAM LINE INSERTION  2/9/2019          TN CYSTOURETHROSCOPY W/IRRIG & EVAC CLOTS N/A 2/10/2019    Procedure: CYSTOSCOPY, BLADDER BIOPSY, RIGHT SIDED URETEROSCOPY WITH SELECTED CYTOLOGY, CLOT IRRIGATION;  Surgeon: Parker Casillas MD;  Location: [facility];  Service: Urology     SALPINGOOPHORECTOMY Left 1969     SIGMOIDOSCOPY FLEXIBLE N/A 8/22/2022    Procedure: SIGMOIDOSCOPY WITH BIOPSIES;  Surgeon: Kimberly Talley MD;  Location: [facility]     TONSILLECTOMY  1942     TOTAL VAGINAL HYSTERECTOMY  1984       Family History:     Family History   Problem Relation Age of Onset     " Heart Disease Mother      Kidney Disease Mother      Aortic aneurysm Mother      Dementia Mother      Heart Disease Father      Cerebrovascular Disease Father      Kidney Disease Father      Dementia Sister      Dementia Maternal Grandmother      Dementia Sister         Social History:    reports that she quit smoking about 22 years ago. She has a 20.00 pack-year smoking history. She has never used smokeless tobacco. She reports that she does not drink alcohol and does not use drugs.    Review of Systems:   12 systems are reviewed negative except for in HPI.    Meds:     Current Outpatient Medications:      albuterol (PROAIR HFA/PROVENTIL HFA/VENTOLIN HFA) 108 (90 Base) MCG/ACT inhaler, Inhale 2 puffs into the lungs every 6 hours, Disp: 18 g, Rfl: 4     allopurinol (ZYLOPRIM) 100 MG tablet, Take 1 tablet (100 mg) by mouth 2 times daily (Patient taking differently: Take 200 mg by mouth daily), Disp: 180 tablet, Rfl: 1     apixaban ANTICOAGULANT (ELIQUIS) 5 MG tablet, Take 1 tablet (5 mg) by mouth 2 times daily, Disp: 180 tablet, Rfl: 3     azelastine (ASTELIN) 0.1 % nasal spray, 2 sprays each nostril 1-2x daily as needed for nasal congestion (use nightly for first 2 week), Disp: 30 mL, Rfl: 11     carvedilol (COREG) 3.125 MG tablet, Take 3.125 mg by mouth 2 times daily, Disp: , Rfl:      Cholecalciferol (VITAMIN D-3) 125 MCG (5000 UT) TABS, Take 5,000 Units by mouth daily, Disp: , Rfl:      HEMP OIL OR EXTRACT OR OTHER CBD CANNABINOID, NOT MEDICAL CANNABIS,, 50 mg At Bedtime Delta 8 Gummies, Disp: , Rfl:      lamoTRIgine (LAMICTAL) 25 MG tablet, One daily 2 weeks, twice a day 2 weeks, 2 morning 1 bedtime 2 weeks, 2 twice a day, Disp: 90 tablet, Rfl: 3     levothyroxine (SYNTHROID/LEVOTHROID) 50 MCG tablet, Take 1 tablet (50 mcg) by mouth daily, Disp: 90 tablet, Rfl: 3     rosuvastatin (CRESTOR) 20 MG tablet, Take 1 tablet (20 mg) by mouth daily, Disp: 90 tablet, Rfl: 0     traZODone (DESYREL) 100 MG tablet, TAKE 3 TO  4 TABLETS BY MOUTH AT BEDTIME (Patient taking differently: Take 400 mg by mouth At Bedtime), Disp: 360 tablet, Rfl: 1     venlafaxine (EFFEXOR XR) 150 MG 24 hr capsule, Take 1 capsule (150 mg) by mouth daily, Disp: 90 capsule, Rfl: 0     venlafaxine (EFFEXOR XR) 37.5 MG 24 hr capsule, Take 1 capsule (37.5 mg) by mouth daily In addition to 150 mg daily (total 187.5 mg/day), Disp: 90 capsule, Rfl: 0     zonisamide (ZONEGRAN) 25 MG capsule, Take 2 capsules (50 mg) by mouth At Bedtime, Disp: 60 capsule, Rfl: 3     amphetamine-dextroamphetamine (ADDERALL) 20 MG tablet, Take 1 tablet (20 mg) by mouth 2 times daily May fill 7/26/22 (Patient not taking: Reported on 10/4/2022), Disp: 60 tablet, Rfl: 0     ondansetron (ZOFRAN ODT) 4 MG ODT tab, Take 1 tablet (4 mg) by mouth every 8 hours as needed for nausea (Patient not taking: Reported on 10/4/2022), Disp: 30 tablet, Rfl: 1    Allergies:   Acamprosate, Augmentin, Carbamazepine, Cyclobenzaprine, Mirtazapine, Prednisone, Pregabalin, Sulfa drugs, Sulfamethoxazole-trimethoprim, Zolpidem, Acetaminophen, Amiloride, Amoxicillin, Aspirin, Bupropion, Chromium, Duloxetine hcl, Nsaids, Other drug allergy (see comments), Oxycodone, Serotonin, Sulindac, Tolmetin, Verapamil, and Valproic acid      Objective:      Physical Exam  70.3 kg (155 lb)     Body mass index is 27.46 kg/m .  /60 (BP Location: Left arm, Patient Position: Sitting, Cuff Size: Adult Regular)   Pulse 76   Resp 16   Wt 70.3 kg (155 lb)   LMP  (LMP Unknown)   BMI 27.46 kg/m      General Appearance:   Awake, Alert, No acute distress.   HEENT:  Pupil equal and reactive to light. No scleral icterus; the mucous membranes were moist.   Neck: No cervical bruits. No JVD. No thyromegaly.     Chest: The spine was straight. The chest was symmetric.   Lungs:   Respirations unlabored; Lungs are clear to auscultation. No crackles. No wheezing.   Cardiovascular:   Regular rhythm and rate, normal first and second heart sounds  with no murmurs. No rubs or gallops.    Abdomen:  Soft. No tenderness. Non-distended. Bowels sounds are present   Extremities: Equal tibial pulses. No leg edema.   Skin: No rashes or ulcers. Warm, Dry.   Musculoskeletal: No tenderness. No deformity.   Neurologic: Mood and affect are appropriate. No focal deficits.         EKG: Personally reviewed  Sinus rhythm   Low voltage QRS   Possible Septal infarct , age undetermined   Abnormal ECG   When compared with ECG of 05-FEB-2022 11:33,   Premature ventricular complexes are no longer Present   Minimal criteria for Septal infarct is now Present     Cardiac Imaging Studies  ECHO on 3-:  1. Normal left ventricular size and systolic performance with a visually  estimated ejection fraction of 60%.  2. There is mild aortic valve sclerosis without significant stenosis.  3. Normal right ventricular size and systolic performance.    Stress cardiac MRI on 7-:  1.  Pharmacological Regadenoson stress cardiac MRI is negative for inducible myocardial ischemia.   2.  Pharmacological stress ECG is negative for inducible myocardial ischemia.   3. Normal left ventricular size, wall thickness and systolic function. The quantified left ventricular  ejection fraction is 60%.  No myocardial scar is identified.  T1 pre-contrast: 1014 ms.   4.  Normal right ventricular size and systolic function.    5.  Aortic valve is trileaflet with moderate sclerosis with no significant aortic valve stenosis.     6. There is a small pericardial effusion. Normal pericardial thickness.      Carotid US on 9-:  IMPRESSION:  1.  Moderate plaque formation, velocities consistent with less than 50% stenosis in the right internal carotid artery.  2.  Mild plaque formation, velocities consistent with less than 50% stenosis in the left internal carotid artery.  3.  Flow within the vertebral arteries is antegrade.    Event monitor on 6-:  Sinus rhythm with occasional PACs and short atrial  runs. Some of the PACs correlated with symptom events, some did not. One symptom event did not correlate with any abnormalities.  No sustained arrhythmias, pauses, or atrial fibrillation.    Lab Review   Lab Results   Component Value Date     10/15/2021    CO2 23 10/15/2021    BUN 21 10/15/2021     Lab Results   Component Value Date    WBC 4.6 10/15/2021    HGB 11.8 10/15/2021    HCT 36.4 10/15/2021     10/15/2021     10/15/2021     Lab Results   Component Value Date    CHOL 129 08/17/2022    CHOL 307 (H) 07/08/2022    CHOL 275 (H) 06/08/2022     Lab Results   Component Value Date    HDL 82 08/17/2022     07/08/2022     06/08/2022     No components found for: LDLCALC  Lab Results   Component Value Date    TRIG 96 08/17/2022    TRIG 57 07/08/2022    TRIG 77 06/08/2022     No results found for: CHOLHDL  Lab Results   Component Value Date    TROPONINI <0.01 03/15/2021     No results found for: BNP  Lab Results   Component Value Date    TSH 0.64 07/13/2021                 Thank you for allowing me to participate in the care of your patient.      Sincerely,     Luz Elena Ybarra MD     [facility]  cc:   Luz Elena Ybarra MD  [address] [place] [address] [address]  [place] [address]

## 2022-10-04 NOTE — PROGRESS NOTES
Assessment/Plan:   1.  Coronary artery disease: Her shortness of breath has been much improved.  She has no chest pain.  Her stress cardiac MRI was negative for inducible myocardial ischemia, no previous myocardial infarction or myocardial scar.  No obvious valvular disease.  Continue carvedilol and Crestor.    2.  Primary hypertension: Her blood pressure is controlled with the carvedilol 3.125 mg twice a day.     3.  Dyslipidemia: Her last LDL was 28. Praluent was stopped due to possible side effect of persistent diarrhea.  Continue Crestor 20 mg at bedtime.  He tolerated with Crestor 20 mg.  Repeat lipid profile and ALT today.     4.  Carotid artery stenosis status post left CEA: Recent carotid ultrasound was reported mild bilateral carotid artery stenosis less than 50%.  Aggressive control LDL.     5.  Pulmonary embolism on warfarin, stage III CKD and other chronic medical issues: Continue to follow-up with Dr. Rees.  She is on Eliquis 5 mg twice a day.  Her creatinine has been stable.    Total 45 minutes were spent in this visit for face to face visit, physical exam, review of current labs/imaging studies, plan for ongoing treatment with >50% spent on counseling and coordination of care as documented in the above mentioned note.      Thank you for the opportunity to be involved in the care of Sandy Spencer. If you have any questions, please feel free to contact me.  I will see the patient again in 6 months and as needed.    Much or all of the text in this note was generated through the use of Dragon Dictate voice-to-text software. Errors in spelling or words which seem out of context are unintentional. Sound alike errors, in particular, may have escaped editing.       History of Present Illness:   It is my pleasure to see Sandy Spencer at the [facility] for routine cardiology follow up.  Sandy Spencer is an 82 year old female with a medical history of coronary artery  disease, essential hypertension, hyperlipidemia, anxiety and depression, pancreatitis, carotid artery stenosis s/p left CEA, pulmonary embolism on warfarin, status post right nephrectomy and chronic kidney disease stage III.    The patient was admitted to hospital with pneumonia and diarrhea last month for 1 week.  She states that her shortness of breath has been much improved.  Her blood pressure was low, carvedilol was reduced to 3.125 mg twice a day.  Currently she is on torsemide 10 mg in the morning, 5 mg in afternoon.  She has no leg edema.  Her weight has been stable.  She has no chest pain, orthopnea, PND.  She has lightheadedness, dizziness, no syncope.  Her blood pressure and heart rate are in normal range today.    Past Medical History:     Patient Active Problem List   Diagnosis     Focal glomerular sclerosis     RSD lower limb, bilateral     ADD (attention deficit disorder)     RSD upper limb, right     Osteopenia     Major depression     Hypertension     Insomnia     Hypercholesterolemia     Right rotator cuff tear     Cluster headaches     Lumbar radiculopathy     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     Temporomandibular joint-pain-dysfunction syndrome (TMJ)     Hypothyroidism     Chronic pain     Snoring     Generalized abdominal pain     Bilateral carotid artery stenosis     Coronary artery disease involving native coronary artery of native heart with angina pectoris (H)     Other chronic pulmonary embolism without acute cor pulmonale (H)     Hematuria     Hydronephrosis     Bladder spasms     Anxiety disorder due to medical condition     Family relationship problem     History of posttraumatic stress disorder (PTSD)     Generalized muscle weakness     Myofascial pain     Moderate major depression (H)     Elevated lipase     Abdominal bloating     Acquired absence of other specified parts of digestive tract     Ampullary stenosis     Obstruction of biliary tree     Anemia     Aphthous  "ulcer of ileum     Bipolar disorder, unspecified (H)     Disorder of phosphorus metabolism     Edema     Gastroesophageal reflux disease without esophagitis     Obstruction of common bile duct     Overflow diarrhea     History of partial colectomy     Reflex sympathetic dystrophy     Solitary left kidney     Flank pain     Hypocitraturia     Primary osteoarthritis of both knees     Iron deficiency anemia     Proteinuria     Concussion with loss of consciousness     Schizoaffective disorder (H)     Muscle weakness (generalized)     Shuffling gait     Falls frequently     Long term current use of anticoagulant therapy     COPD (chronic obstructive pulmonary disease) (H)     CKD (chronic kidney disease) stage 4, GFR 15-29 ml/min (H)     Other cervical disc degeneration, unspecified cervical region     Derangement of meniscus     Intractable chronic migraine without aura     Occipital neuralgia     Post-traumatic headache     S/p nephrectomy     Sciatic neuropathy     Pain in right knee     Leg pain, bilateral     Cervical radiculopathy     Spinal stenosis of cervical region     Fibrositis of neck     Hypokalemia     Diarrhea, unspecified type     Hypotension due to hypovolemia       Past Surgical History:     Past Surgical History:   Procedure Laterality Date     APPENDECTOMY       BELPHAROPTOSIS REPAIR      bilateral     BILE DUCT STENT PLACEMENT       BIOPSY BREAST Left     benign  1970s     CAROTID ENDARTERECTOMY Left 2009     CHOLECYSTECTOMY       COLECTOMY  1978    \"subtotal\"     ERCP W/ SPHICTEROTOMY  01/03/2017    Placement of ventral pancreatic duct stent     ESOPHAGOSCOPY, GASTROSCOPY, DUODENOSCOPY (EGD), COMBINED N/A 12/14/2018    Procedure: ENDOSCOPIC ULTRASOUND;  Surgeon: Babak Merida MD;  Location: [facility] [place];  Service: Gastroenterology     EYE SURGERY      Cataract     HC CYSTOSCOPY,INSERT URETERAL STENT Right 2/16/2019    Procedure: Cystoscopy with Retrograde and right stent " "exchange.;  Surgeon: Jayla De Paz MD;  Location: [facility];  Service: Urology     HYSTERECTOMY       IR INFERIOR VENACAVAGRAM  2/23/2019     IR IVC FILTER PLACEMENT  2/14/2019     IR IVC FILTER PLACEMENT  2/14/2019     IR IVC FILTER REMOVAL  10/16/2019     IR REMOVAL IVC FILTER  10/16/2019     IR VENOCAVAGRAM INFERIOR  2/23/2019     LAPAROTOMY EXPLORATORY  7/2013    after cholecystectomy     LAPAROTOMY EXPLORATORY      after cholecystectomy had surgery for \"something that was nicked\", gravely ill and in ICU for 1 month     LUMBAR LAMINECTOMY Left 8/11/2016    Procedure: LEFT L4-5 HEMILAMINECTOMY / MEDIAL FACETECTOMY & FORAMINOTOMY, RIGHT L5-S1 HEMILAMINECTOMY WITH FACETECTOMY & FORAMINOTOMY;  Surgeon: Litzy Patel MD;  Location: [facility];  Service:      NECK SURGERY  2010    neck muscle repair     NEPHROURETERECTOMY Right 2/20/2019    Procedure: ROBOTIC RIGHT NEPHROURETERECTOMY;  Surgeon: Parker Casillas MD;  Location: [facility];  Service: Urology     OTHER SURGICAL HISTORY      benign breast cyst excision     PICC  2/25/2019          PICC AND MIDLINE TEAM LINE INSERTION  2/9/2019          WA CYSTOURETHROSCOPY W/IRRIG & EVAC CLOTS N/A 2/10/2019    Procedure: CYSTOSCOPY, BLADDER BIOPSY, RIGHT SIDED URETEROSCOPY WITH SELECTED CYTOLOGY, CLOT IRRIGATION;  Surgeon: Parker Casillas MD;  Location: [facility];  Service: Urology     SALPINGOOPHORECTOMY Left 1969     SIGMOIDOSCOPY FLEXIBLE N/A 8/22/2022    Procedure: SIGMOIDOSCOPY WITH BIOPSIES;  Surgeon: Kimberly Talley MD;  Location: [facility]     TONSILLECTOMY  1942     TOTAL VAGINAL HYSTERECTOMY  1984       Family History:     Family History   Problem Relation Age of Onset     Heart Disease Mother      Kidney Disease Mother      Aortic aneurysm Mother      Dementia Mother      Heart Disease Father      Cerebrovascular Disease Father      Kidney Disease Father      Dementia Sister      Dementia " Maternal Grandmother      Dementia Sister         Social History:    reports that she quit smoking about 22 years ago. She has a 20.00 pack-year smoking history. She has never used smokeless tobacco. She reports that she does not drink alcohol and does not use drugs.    Review of Systems:   12 systems are reviewed negative except for in HPI.    Meds:     Current Outpatient Medications:      albuterol (PROAIR HFA/PROVENTIL HFA/VENTOLIN HFA) 108 (90 Base) MCG/ACT inhaler, Inhale 2 puffs into the lungs every 6 hours, Disp: 18 g, Rfl: 4     allopurinol (ZYLOPRIM) 100 MG tablet, Take 1 tablet (100 mg) by mouth 2 times daily (Patient taking differently: Take 200 mg by mouth daily), Disp: 180 tablet, Rfl: 1     apixaban ANTICOAGULANT (ELIQUIS) 5 MG tablet, Take 1 tablet (5 mg) by mouth 2 times daily, Disp: 180 tablet, Rfl: 3     azelastine (ASTELIN) 0.1 % nasal spray, 2 sprays each nostril 1-2x daily as needed for nasal congestion (use nightly for first 2 week), Disp: 30 mL, Rfl: 11     carvedilol (COREG) 3.125 MG tablet, Take 3.125 mg by mouth 2 times daily, Disp: , Rfl:      Cholecalciferol (VITAMIN D-3) 125 MCG (5000 UT) TABS, Take 5,000 Units by mouth daily, Disp: , Rfl:      HEMP OIL OR EXTRACT OR OTHER CBD CANNABINOID, NOT MEDICAL CANNABIS,, 50 mg At Bedtime Delta 8 Gummies, Disp: , Rfl:      lamoTRIgine (LAMICTAL) 25 MG tablet, One daily 2 weeks, twice a day 2 weeks, 2 morning 1 bedtime 2 weeks, 2 twice a day, Disp: 90 tablet, Rfl: 3     levothyroxine (SYNTHROID/LEVOTHROID) 50 MCG tablet, Take 1 tablet (50 mcg) by mouth daily, Disp: 90 tablet, Rfl: 3     rosuvastatin (CRESTOR) 20 MG tablet, Take 1 tablet (20 mg) by mouth daily, Disp: 90 tablet, Rfl: 0     traZODone (DESYREL) 100 MG tablet, TAKE 3 TO 4 TABLETS BY MOUTH AT BEDTIME (Patient taking differently: Take 400 mg by mouth At Bedtime), Disp: 360 tablet, Rfl: 1     venlafaxine (EFFEXOR XR) 150 MG 24 hr capsule, Take 1 capsule (150 mg) by mouth daily, Disp: 90  capsule, Rfl: 0     venlafaxine (EFFEXOR XR) 37.5 MG 24 hr capsule, Take 1 capsule (37.5 mg) by mouth daily In addition to 150 mg daily (total 187.5 mg/day), Disp: 90 capsule, Rfl: 0     zonisamide (ZONEGRAN) 25 MG capsule, Take 2 capsules (50 mg) by mouth At Bedtime, Disp: 60 capsule, Rfl: 3     amphetamine-dextroamphetamine (ADDERALL) 20 MG tablet, Take 1 tablet (20 mg) by mouth 2 times daily May fill 7/26/22 (Patient not taking: Reported on 10/4/2022), Disp: 60 tablet, Rfl: 0     ondansetron (ZOFRAN ODT) 4 MG ODT tab, Take 1 tablet (4 mg) by mouth every 8 hours as needed for nausea (Patient not taking: Reported on 10/4/2022), Disp: 30 tablet, Rfl: 1    Allergies:   Acamprosate, Augmentin, Carbamazepine, Cyclobenzaprine, Mirtazapine, Prednisone, Pregabalin, Sulfa drugs, Sulfamethoxazole-trimethoprim, Zolpidem, Acetaminophen, Amiloride, Amoxicillin, Aspirin, Bupropion, Chromium, Duloxetine hcl, Nsaids, Other drug allergy (see comments), Oxycodone, Serotonin, Sulindac, Tolmetin, Verapamil, and Valproic acid      Objective:      Physical Exam  70.3 kg (155 lb)     Body mass index is 27.46 kg/m .  /60 (BP Location: Left arm, Patient Position: Sitting, Cuff Size: Adult Regular)   Pulse 76   Resp 16   Wt 70.3 kg (155 lb)   LMP  (LMP Unknown)   BMI 27.46 kg/m      General Appearance:   Awake, Alert, No acute distress.   HEENT:  Pupil equal and reactive to light. No scleral icterus; the mucous membranes were moist.   Neck: No cervical bruits. No JVD. No thyromegaly.     Chest: The spine was straight. The chest was symmetric.   Lungs:   Respirations unlabored; Lungs are clear to auscultation. No crackles. No wheezing.   Cardiovascular:   Regular rhythm and rate, normal first and second heart sounds with no murmurs. No rubs or gallops.    Abdomen:  Soft. No tenderness. Non-distended. Bowels sounds are present   Extremities: Equal tibial pulses. No leg edema.   Skin: No rashes or ulcers. Warm, Dry.    Musculoskeletal: No tenderness. No deformity.   Neurologic: Mood and affect are appropriate. No focal deficits.         EKG: Personally reviewed  Sinus rhythm   Low voltage QRS   Possible Septal infarct , age undetermined   Abnormal ECG   When compared with ECG of 05-FEB-2022 11:33,   Premature ventricular complexes are no longer Present   Minimal criteria for Septal infarct is now Present     Cardiac Imaging Studies  ECHO on 3-:  1. Normal left ventricular size and systolic performance with a visually  estimated ejection fraction of 60%.  2. There is mild aortic valve sclerosis without significant stenosis.  3. Normal right ventricular size and systolic performance.    Stress cardiac MRI on 7-:  1.  Pharmacological Regadenoson stress cardiac MRI is negative for inducible myocardial ischemia.   2.  Pharmacological stress ECG is negative for inducible myocardial ischemia.   3. Normal left ventricular size, wall thickness and systolic function. The quantified left ventricular  ejection fraction is 60%.  No myocardial scar is identified.  T1 pre-contrast: 1014 ms.   4.  Normal right ventricular size and systolic function.    5.  Aortic valve is trileaflet with moderate sclerosis with no significant aortic valve stenosis.     6. There is a small pericardial effusion. Normal pericardial thickness.      Carotid US on 9-:  IMPRESSION:  1.  Moderate plaque formation, velocities consistent with less than 50% stenosis in the right internal carotid artery.  2.  Mild plaque formation, velocities consistent with less than 50% stenosis in the left internal carotid artery.  3.  Flow within the vertebral arteries is antegrade.    Event monitor on 6-:  Sinus rhythm with occasional PACs and short atrial runs. Some of the PACs correlated with symptom events, some did not. One symptom event did not correlate with any abnormalities.  No sustained arrhythmias, pauses, or atrial fibrillation.    Lab Review    Lab Results   Component Value Date     10/15/2021    CO2 23 10/15/2021    BUN 21 10/15/2021     Lab Results   Component Value Date    WBC 4.6 10/15/2021    HGB 11.8 10/15/2021    HCT 36.4 10/15/2021     10/15/2021     10/15/2021     Lab Results   Component Value Date    CHOL 129 08/17/2022    CHOL 307 (H) 07/08/2022    CHOL 275 (H) 06/08/2022     Lab Results   Component Value Date    HDL 82 08/17/2022     07/08/2022     06/08/2022     No components found for: LDLCALC  Lab Results   Component Value Date    TRIG 96 08/17/2022    TRIG 57 07/08/2022    TRIG 77 06/08/2022     No results found for: CHOLHDL  Lab Results   Component Value Date    TROPONINI <0.01 03/15/2021     No results found for: BNP  Lab Results   Component Value Date    TSH 0.64 07/13/2021

## 2022-10-05 ENCOUNTER — TELEPHONE (OUTPATIENT)
Dept: CARDIOLOGY | Facility: CLINIC | Age: 82
End: 2022-10-05

## 2022-10-05 ENCOUNTER — VIRTUAL VISIT (OUTPATIENT)
Dept: SLEEP MEDICINE | Facility: CLINIC | Age: 82
End: 2022-10-05
Attending: PHYSICIAN ASSISTANT
Payer: MEDICARE

## 2022-10-05 ENCOUNTER — TRANSFERRED RECORDS (OUTPATIENT)
Dept: HEALTH INFORMATION MANAGEMENT | Facility: CLINIC | Age: 82
End: 2022-10-05

## 2022-10-05 VITALS — BODY MASS INDEX: 26.58 KG/M2 | WEIGHT: 150 LBS | HEIGHT: 63 IN

## 2022-10-05 DIAGNOSIS — G47.9 SLEEP DISTURBANCE: ICD-10-CM

## 2022-10-05 DIAGNOSIS — G47.00 INSOMNIA, UNSPECIFIED TYPE: ICD-10-CM

## 2022-10-05 DIAGNOSIS — E78.00 HYPERCHOLESTEROLEMIA: ICD-10-CM

## 2022-10-05 DIAGNOSIS — Z78.9 INTOLERANCE OF CONTINUOUS POSITIVE AIRWAY PRESSURE (CPAP) VENTILATION: ICD-10-CM

## 2022-10-05 DIAGNOSIS — G47.34 NOCTURNAL HYPOXEMIA: Primary | ICD-10-CM

## 2022-10-05 DIAGNOSIS — E78.00 HYPERCHOLESTEROLEMIA: Primary | ICD-10-CM

## 2022-10-05 PROCEDURE — 99205 OFFICE O/P NEW HI 60 MIN: CPT | Mod: 95 | Performed by: INTERNAL MEDICINE

## 2022-10-05 RX ORDER — ROSUVASTATIN CALCIUM 10 MG/1
30 TABLET, COATED ORAL DAILY
Qty: 90 TABLET | Refills: 3 | Status: SHIPPED | OUTPATIENT
Start: 2022-10-05 | End: 2022-10-06

## 2022-10-05 RX ORDER — ROSUVASTATIN CALCIUM 10 MG/1
30 TABLET, COATED ORAL DAILY
Qty: 90 TABLET | Refills: 3 | Status: CANCELLED | OUTPATIENT
Start: 2022-10-05

## 2022-10-05 ASSESSMENT — SLEEP AND FATIGUE QUESTIONNAIRES
HOW LIKELY ARE YOU TO NOD OFF OR FALL ASLEEP IN A CAR, WHILE STOPPED FOR A FEW MINUTES IN TRAFFIC: WOULD NEVER DOZE
HOW LIKELY ARE YOU TO NOD OFF OR FALL ASLEEP WHILE SITTING AND READING: SLIGHT CHANCE OF DOZING
HOW LIKELY ARE YOU TO NOD OFF OR FALL ASLEEP WHILE SITTING AND TALKING TO SOMEONE: WOULD NEVER DOZE
HOW LIKELY ARE YOU TO NOD OFF OR FALL ASLEEP WHEN YOU ARE A PASSENGER IN A CAR FOR AN HOUR WITHOUT A BREAK: SLIGHT CHANCE OF DOZING
HOW LIKELY ARE YOU TO NOD OFF OR FALL ASLEEP WHILE WATCHING TV: SLIGHT CHANCE OF DOZING
HOW LIKELY ARE YOU TO NOD OFF OR FALL ASLEEP WHILE LYING DOWN TO REST IN THE AFTERNOON WHEN CIRCUMSTANCES PERMIT: HIGH CHANCE OF DOZING
HOW LIKELY ARE YOU TO NOD OFF OR FALL ASLEEP WHILE SITTING QUIETLY AFTER LUNCH WITHOUT ALCOHOL: HIGH CHANCE OF DOZING
HOW LIKELY ARE YOU TO NOD OFF OR FALL ASLEEP WHILE SITTING INACTIVE IN A PUBLIC PLACE: WOULD NEVER DOZE

## 2022-10-05 ASSESSMENT — PAIN SCALES - GENERAL: PAINLEVEL: MODERATE PAIN (4)

## 2022-10-05 NOTE — PATIENT INSTRUCTIONS
Insomnia and Behavioral Sleep Medicine Program    The [facility] Insomnia and Behavioral Sleep Medicine Program provides non-drug treatment for sleep problems including:    Cognitive-behavioral Therapies for Insomnia (CBT-I)  Management of Shift-work and Jet Lag  Management of Delayed, Advanced and Irregular Circadian Rhythm Sleep Disorders  Imagery Rehearsal Therapy (IRT) for Nightmare Disorder  PAP Therapy Desensitization    You have been referred for consultation with a sleep psychologist who specializes in behavioral sleep medicine and treatment of insomnia.  The [facility] Insomnia and Behavioral Sleep Medicine Program offers individualized telehealth services through our [facility] and online CBT-I.    Preparing for your Consultation    You will need to keep a Sleep Diary for at least a week prior to your visit. Complete the sleep diary each day first thing after you get up by answering a few key questions about your sleep using our convenient mobile ashley or paper sleep diary.  Your answers should be based on your recall of the past 24 hours.  Avoid watching the clock or recording data during the night.     Insomnia  Ashley    The Insomnia  mobile ashley  is a convenient way to keep track of your sleep prior to your sleep consultation.  Simply download the free ashley on your Apple or Android phone and record your information each morning.  The ashley includes training, self-assessment, and sleep schedule recommendations.  Prior to your consultation we recommend you use only the sleep diary function. You can e-mail yourself a copy of your sleep diary data by going to the Settings section and using the [place] User Data function.  During your consultation your provider will review the data with you.          [facility] Sleep Diary    You can also track your sleep using the [facility] paper sleep diary.  You can upload your sleep diary and send it via a [employer]  message, fax it to [phone], or have it with you at the time of your consultation.            CBT-I:  Frequently Asked Questions    What is CBT-I?    Cognitive Behavioral Therapy for Insomnia, also known as CBT-I, is a highly effective non-drug treatment for insomnia. The American College of Physicians recommends CBT-I as the first treatment for chronic insomnia.  Research has shown CBT-I to be safer and more effective long term than sleeping pills.    What does CBT-I involve?     CBT-I targets behaviors that lead to chronic insomnia:  Habits that weaken the bed as a cue for sleep  Habits that weaken your body's sleep drive and sleep/wake clock   Unhelpful sleep thoughts that increase sleep-related worry and arousal.    The process involves 3-6 telehealth visits that guide you to implement proven strategies to get a better night's sleep.    People often see improvement in their sleep within a few weeks. Research shows if you keep practicing the skills you learn your sleep is likely to continue to improve 6-12 months after treatment.    Does this program prescribe or manage sleep medication?    No.  Your prescribing provider is responsible to assist you in managing your sleep medications.  Some people choose to stop using sleep medication prior to or during CBT-I.  Our program can work with your prescribing provider to help reduce or eliminate use of sleep medications.     Getting Started Today!    If you haven't already done so, we recommend you consider making the following changes to your sleep habits prior to your sleep consultation:     Reduce your consumption of caffeine and alcohol.  Both can disrupt sleep and make strengthening your sleep more difficult.  Specifically:    - Avoid caffeine within 6 hours of bedtime   - No more than 3 caffeinated beverages per day (e.g. 8 oz. cup coffee or 12 oz. cup soda)            - No alcohol within 3 hours of bedtime    Make sure your bedroom is quiet, comfortable and  dark.  Noise, light and an uncomfortable sleep space can harm your sleep.      Keep the same sleep schedule 7 days a week.unless you do shift work.      Online CBT-I     If you want to get started today, research indicates that online CBT-I can be effective for some individuals. These programs requires comfort with walt-based or online learning.  However, digital CBT-I programs are not for everyone.  Contraindications include:    Seizure disorders,   Bipolar disorder,   Unstable medical or mental health conditions,   Frailty or risk of falling  Pregnancy    You should consult a sleep specialist before using these resources if you have:    Sleep Apnea  Restless Leg Syndrome  Sleep Walking  REM behavior disorder  Night Terrors  Excessive Daytime Sleepiness  Are engaged in shift work  Use prescription sleep medication    Our Online CBT-I program    If your sleep provider recommends online CBT-I for you , the cost for an entire 6-week program is $40.    To get started, copy and paste the link below which will take you to the landing page to register:                           [email or web address]               If you wish to complete the online CBT-I program but do not plan to follow-up with a sleep provider, you are set to begin the program.    If you are planning to work with an [facility] sleep provider, there are a couple of extra steps you can take to share your sleep data with your sleep provider.  To share sleep log data, go to the left side navigation and click on the  share sleep log  button:         You will be taken to the page below where you will enter  the provider code [facility] into the box.          Once you press the locate button, the information for [facility] will pop up as below.  By pressing the Submit button your data will be sent to our  secure [facility] sleep program portal for review by your sleep provider. You will only need to do this step once.                            "       Self-help Workbooks for Insomnia    If you have found self-help books useful in the past, you may want to consider reading one of the following books prior to your consultation:    Say Hilda to Insomnia: The Six-Week, Drug-Free Program Developed at [facility].  Fernando Espinosa MD. Available in paperback, Willi, and audiobook.    Overcoming Insomnia: A Cognitive-Behavioral Therapy Approach, Workbook.  Joseph Desir, PhD  and Maru Mark, PhD.  Available in paperback and Willi.    Quiet Your Mind and Get to Sleep: Solutions to Insomnia for Those with Depression, Anxiety, or Chronic Pain.  Yana Beaver, PhD and Maru Mark, PhD.  Available in paperback and Willi           MY TREATMENT INFORMATION FOR SLEEP APNEA-  Sandy Spencer    DOCTOR : Eli Kaufman MD    Am I having a sleep study at a sleep center?  --->Due to normal delays, you will be contacted within 2-4 weeks to schedule    Am I having a home sleep study?  --->Watch the video for the device you are using:    -/drop off device-   https://www.Inotrem.com/watch?v=yGGFBdELGhk    -Disposable device sent out require phone/computer application-   https://www.youVital Sensorsube.com/watch?v=BCce_vbiwxE      Frequently asked questions:  1. What is Obstructive Sleep Apnea (ALIYAH)? ALIYAH is the most common type of sleep apnea. Apnea means, \"without breath.\"  Apnea is most often caused by narrowing or collapse of the upper airway as muscles relax during sleep.   Almost everyone has occasional apneas. Most people with sleep apnea have had brief interruptions at night frequently for many years.  The severity of sleep apnea is related to how frequent and severe the events are.   2. What are the consequences of ALIYAH? Symptoms include: feeling sleepy during the day, snoring loudly, gasping or stopping of breathing, trouble sleeping, and occasionally morning headaches or heartburn at night.  Sleepiness can be serious and even increase the risk of " falling asleep while driving. Other health consequences may include development of high blood pressure and other cardiovascular disease in persons who are susceptible. Untreated ALIYAH  can contribute to heart disease, stroke and diabetes.   3. What are the treatment options? In most situations, sleep apnea is a lifelong disease that must be managed with daily therapy. Medications are not effective for sleep apnea and surgery is generally not considered until other therapies have been tried. Your treatment is your choice . Continuous Positive Airway (CPAP) works right away and is the therapy that is effective in nearly everyone. An oral device to hold your jaw forward is usually the next most reliable option. Other options include postioning devices (to keep you off your back), weight loss, and surgery including a tongue pacing device. There is more detail about some of these options below.  4. Are my sleep studies covered by insurance? Although we will request verification of coverage, we advise you also check in advance of the study to ensure there is coverage.    Important tips for those choosing CPAP and similar devices   Know your equipment:  CPAP is continuous positive airway pressure that prevents obstructive sleep apnea by keeping the throat from collapsing while you are sleeping. In most cases, the device is  smart  and can slowly self-adjusts if your throat collapses and keeps a record every day of how well you are treated-this information is available to you and your care team.  BPAP is bilevel positive airway pressure that keeps your throat open and also assists each breath with a pressure boost to maintain adequate breathing.  Special kinds of BPAP are used in patients who have inadequate breathing from lung or heart disease. In most cases, the device is  smart  and can slowly self-adjusts to assist breathing. Like CPAP, the device keeps a record of how well you are treated.  Your mask is your connection  to the device. You get to choose what feels most comfortable and the staff will help to make sure if fits. Here: are some examples of the different masks that are available:       Key points to remember on your journey with sleep apnea:  Sleep study.  PAP devices often need to be adjusted during a sleep study to show that they are effective and adjusted right.  Good tips to remember: Try wearing just the mask during a quiet time during the day so your body adapts to wearing it. A humidifier is recommended for comfort in most cases to prevent drying of your nose and throat. Allergy medication from your provider may help you if you are having nasal congestion.  Getting settled-in. It takes more than one night for most of us to get used to wearing a mask. Try wearing just the mask during a quiet time during the day so your body adapts to wearing it. A humidifier is recommended for comfort in most cases. Our team will work with you carefully on the first day and will be in contact within 4 days and again at 2 and 4 weeks for advice and remote device adjustments. Your therapy is evaluated by the device each day.   Use it every night. The more you are able to sleep naturally for 7-8 hours, the more likely you will have good sleep and to prevent health risks or symptoms from sleep apnea. Even if you use it 4 hours it helps. Occasionally all of us are unable to use a medical therapy, in sleep apnea, it is not dangerous to miss one night.   Communicate. Call our skilled team on the number provided on the first day if your visit for problems that make it difficult to wear the device. Over 2 out of 3 patients can learn to wear the device long-term with help from our team. Remember to call our team or your sleep providers if you are unable to wear the device as we may have other solutions for those who cannot adapt to mask CPAP therapy. It is recommended that you sleep your sleep provider within the first 3 months and yearly  after that if you are not having problems.   Use it for your health. We encourage use of CPAP masks during daytime quiet periods to allow your face and brain to adapt to the sensation of CPAP so that it will be a more natural sensation to awaken to at night or during naps. This can be very useful during the first few weeks or months of adapting to CPAP though it does not help medically to wear CPAP during wakefulness and  should not be used as a strategy just to meet guidelines.  Take care of your equipment. Make sure you clean your mask and tubing using directions every day and that your filter and mask are replaced as recommended or if they are not working.     BESIDES CPAP, WHAT OTHER THERAPIES ARE THERE?    Positioning Device  Positioning devices are generally used when sleep apnea is mild and only occurs on your back.This example shows a pillow that straps around the waist. It may be appropriate for those whose sleep study shows milder sleep apnea that occurs primarily when lying flat on one's back. Preliminary studies have shown benefit but effectiveness at home may need to be verified by a home sleep test. These devices are generally not covered by medical insurance.  Examples of devices that maintain sleeping on the back to prevent snoring and mild sleep apnea.    Belt type body positioner  http://Evolv/    Electronic reminder  http://nightshifttherapy.com/            Oral Appliance  What is oral appliance therapy?  An oral appliance device fits on your teeth at night like a retainer used after having braces. The device is made by a specialized dentist and requires several visits over 1-2 months before a manufactured device is made to fit your teeth and is adjusted to prevent your sleep apnea. Once an oral device is working properly, snoring should be improved. A home sleep test may be recommended at that time if to determine whether the sleep apnea is adequately treated.       Some things to  remember:  -Oral devices are often, but not always, covered by your medical insurance. Be sure to check with your insurance provider.   -If you are referred for oral therapy, you will be given a list of specialized dentists to consider or you may choose to visit the Web site of the American Academy of Dental Sleep Medicine  -Oral devices are less likely to work if you have severe sleep apnea or are extremely overweight.     More detailed information  An oral appliance is a small acrylic device that fits over the upper and lower teeth  (similar to a retainer or a mouth guard). This device slightly moves jaw forward, which moves the base of the tongue forward, opens the airway, improves breathing for effective treat snoring and obstructive sleep apnea in perhaps 7 out of 10 people .  The best working devices are custom-made by a dental device  after a mold is made of the teeth 1, 2, 3.  When is an oral appliance indicated?  Oral appliance therapy is recommended as a first-line treatment for patients with primary snoring, mild sleep apnea, and for patients with moderate sleep apnea who prefer appliance therapy to use of CPAP4, 5. Severity of sleep apnea is determined by sleep testing and is based on the number of respiratory events per hour of sleep.   How successful is oral appliance therapy?  The success rate of oral appliance therapy in patients with mild sleep apnea is 75-80% while in patients with moderate sleep apnea it is 50-70%. The chance of success in patients with severe sleep apnea is 40-50%. The research also shows that oral appliances have a beneficial effect on the cardiovascular health of ALIYAH patients at the same magnitude as CPAP therapy7.  Oral appliances should be a second-line treatment in cases of severe sleep apnea, but if not completely successful then a combination therapy utilizing CPAP plus oral appliance therapy may be effective. Oral appliances tend to be effective in a broad  range of patients although studies show that the patients who have the highest success are females, younger patients, those with milder disease, and less severe obesity. 3, 6.   Finding a dentist that practices dental sleep medicine  Specific training is available through the American Academy of Dental Sleep Medicine for dentists interested in working in the field of sleep. To find a dentist who is educated in the field of sleep and the use of oral appliances, near you, visit the Web site of the American Academy of Dental Sleep Medicine.    References  1. Madeleine et al. Objectively measured vs self-reported compliance during oral appliance therapy for sleep-disordered breathing. Chest 2013; 144(5): 8155-3085.  2. Asha, et al. Objective measurement of compliance during oral appliance therapy for sleep-disordered breathing. Thorax 2013; 68(1): 91-96.  3. Irene et al. Mandibular advancement devices in 620 men and women with ALIYAH and snoring: tolerability and predictors of treatment success. Chest 2004; 125: 9024-5784.  4. Tasha, et al. Oral appliances for snoring and ALIYAH: a review. Sleep 2006; 29: 244-262.  5. Nelson et al. Oral appliance treatment for ALIYAH: an update. J Clin Sleep Med 2014; 10(2): 215-227.  6. Godfrey et al. Predictors of OSAH treatment outcome. J Dent Res 2007; 86: 9470-6046.      Weight Loss:    Weight loss is a long-term strategy that may improve sleep apnea in some patients.    Weight management is a personal decision and the decision should be based on your interest and the potential benefits.  If you are interested in exploring weight loss strategies, the following discussion covers the impact on weight loss on sleep apnea and the approaches that may be successful.    Being overweight does not necessarily mean you will have health consequences.  Those who have BMI over 35 or over 27 with existing medical conditions carries greater risk.   Weight loss decreases severity of  sleep apnea in most people with obesity. For those with mild obesity who have developed snoring with weight gain, even 15-30 pound weight loss can improve and occasionally eliminate sleep apnea.  Structured and life-long dietary and health habits are necessary to lose weight and keep healthier weight levels.     Though there may be significant health benefits from weight loss, long-term weight loss is very difficult to achieve- studies show success with dietary management in less than 10% of people. In addition, substantial weight loss may require years of dietary control and may be difficult if patients have severe obesity. In these cases, surgical management may be considered.  Finally, older individuals who have tolerated obesity without health complications may be less likely to benefit from weight loss strategies.        Surgery:    Surgery for obstructive sleep apnea is considered generally only when other therapies fail to work. Surgery may be discussed with you if you are having a difficult time tolerating CPAP and or when there is an abnormal structure that requires surgical correction.  Nose and throat surgeries often enlarge the airway to prevent collapse.  Most of these surgeries create pain for 1-2 weeks and up to half of the most common surgeries are not effective throughout life.  You should carefully discuss the benefits and drawbacks to surgery with your sleep provider and surgeon to determine if it is the best solution for you.   More information  Surgery for ALIYAH is directed at areas that are responsible for narrowing or complete obstruction of the airway during sleep.  There are a wide range of procedures available to enlarge and/or stabilize the airway to prevent blockage of breathing in the three major areas where it can occur: the palate, tongue, and nasal regions.  Successful surgical treatment depends on the accurate identification of the factors responsible for obstructive sleep apnea in  each person.  A personalized approach is required because there is no single treatment that works well for everyone.  Because of anatomic variation, consultation with an examination by a sleep surgeon is a critical first step in determining what surgical options are best for each patient.  In some cases, examination during sedation may be recommended in order to guide the selection of procedures.  Patients will be counseled about risks and benefits as well as the typical recovery course after surgery. Surgery is typically not a cure for a person s ALIYAH.  However, surgery will often significantly improve one s ALIYAH severity (termed  success rate ).  Even in the absence of a cure, surgery will decrease the cardiovascular risk associated with OSA7; improve overall quality of life8 (sleepiness, functionality, sleep quality, etc).      Palate Procedures:  Patients with ALIYAH often have narrowing of their airway in the region of their tonsils and uvula.  The goals of palate procedures are to widen the airway in this region as well as to help the tissues resist collapse.  Modern palate procedure techniques focus on tissue conservation and soft tissue rearrangement, rather than tissue removal.  Often the uvula is preserved in this procedure. Residual sleep apnea is common in patient after pharyngoplasty with an average reduction in sleep apnea events of 33%2.      Tongue Procedures:  ExamWhile patients are awake, the muscles that surround the throat are active and keep this region open for breathing. These muscles relax during sleep, allowing the tongue and other structures to collapse and block breathing.  There are several different tongue procedures available.  Selection of a tongue base procedure depends on characteristics seen on physical exam.  Generally, procedures are aimed at removing bulky tissues in this area or preventing the back of the tongue from falling back during sleep.  Success rates for tongue surgery range  from 50-62%3.    Hypoglossal Nerve Stimulation:  Hypoglossal nerve stimulation has recently received approval from the [place] Food and Drug Administration for the treatment of obstructive sleep apnea.  This is based on research showing that the system was safe and effective in treating sleep apnea6.  Results showed that the median AHI score decreased 68%, from 29.3 to 9.0. This therapy uses an implant system that senses breathing patterns and delivers mild stimulation to airway muscles, which keeps the airway open during sleep.  The system consists of three fully implanted components: a small generator (similar in size to a pacemaker), a breathing sensor, and a stimulation lead.  Using a small handheld remote, a patient turns the therapy on before bed and off upon awakening.    Candidates for this device must be greater than 22 years of age, have moderate to severe ALIYAH (AHI between 20-65), BMI less than 32, have tried CPAP/oral appliance without success, and have appropriate upper airway anatomy (determined by a sleep endoscopy performed by Dr. Ivan).    Hypoglossal Nerve Stimulation Pathway:    The sleep surgeon s office will work with the patient through the insurance prior-authorization process (including communications and appeals).    Nasal Procedures:  Nasal obstruction can interfere with nasal breathing during the day and night.  Studies have shown that relief of nasal obstruction can improve the ability of some patients to tolerate positive airway pressure therapy for obstructive sleep apnea1.  Treatment options include medications such as nasal saline, topical corticosteroid and antihistamine sprays, and oral medications such as antihistamines or decongestants. Non-surgical treatments can include external nasal dilators for selected patients. If these are not successful by themselves, surgery can improve the nasal airway either alone or in combination with these other options.      Combination  Procedures:  Combination of surgical procedures and other treatments may be recommended, particularly if patients have more than one area of narrowing or persistent positional disease.  The success rate of combination surgery ranges from 66-80%2,3.    References  Sirena RESENDIZ. The Role of the Nose in Snoring and Obstructive Sleep Apnoea: An Update.  Eur Arch Otorhinolaryngol. 2011; 268: 1365-73.   Roger SM; Wanda JA; Harris JR; Pallanch JF; Sanam MB; Gaston SG; Marta PATTERSON. Surgical modifications of the upper airway for obstructive sleep apnea in adults: a systematic review and meta-analysis. SLEEP 2010;33(10):8793-9633. Cabrera ROLDAN. Hypopharyngeal surgery in obstructive sleep apnea: an evidence-based medicine review.  Arch Otolaryngol Head Neck Surg. 2006 Feb;132(2):206-13.  Raúl YH1, Dayron Y, Rickey JUAN. The efficacy of anatomically based multilevel surgery for obstructive sleep apnea. Otolaryngol Head Neck Surg. 2003 Oct;129(4):327-35.  Kezirian E, Goldberg A. Hypopharyngeal Surgery in Obstructive Sleep Apnea: An Evidence-Based Medicine Review. Arch Otolaryngol Head Neck Surg. 2006 Feb;132(2):206-13.  Simin PJ et al. Upper-Airway Stimulation for Obstructive Sleep Apnea.  N Engl J Med. 2014 Jan 9;370(2):139-49.  Maribel Y et al. Increased Incidence of Cardiovascular Disease in Middle-aged Men with Obstructive Sleep Apnea. Am J Respir Crit Care Med; 2002 166: 159-165  Ann EM et al. Studying Life Effects and Effectiveness of Palatopharyngoplasty (SLEEP) study: Subjective Outcomes of Isolated Uvulopalatopharyngoplasty. Otolaryngol Head Neck Surg. 2011; 144: 623-631.        WHAT IF I ONLY HAVE SNORING?    Mandibular advancement devices, lateral sleep positioning, long-term weight loss and treatment of nasal allergies have been shown to improve snoring.  Exercising tongue muscles with a game (GoTV Networksttps://apps.QualySense/us/walt/soundly-reduce-snoring/lz7151751925) or stimulating the tongue during the day with a device  (https://doi.org/10.3390/tmp45220661) have improved snoring in some individuals.    Remember to Drive Safe... Drive Alive     Sleep health profoundly affects your health, mood, and your safety.  Thirty three percent of the population (one in three of us) is not getting enough sleep and many have a sleep disorder. Not getting enough sleep or having an untreated / undertreated sleep condition may make us sleepy without even knowing it. In fact, our driving could be dramatically impaired due to our sleep health. As your provider, here are some things I would like you to know about driving:     Here are some warning signs for impairment and dangerous drowsy driving:              -Having been awake more than 16 hours               -Looking tired               -Eyelid drooping              -Head nodding (it could be too late at this point)              -Driving for more than 30 minutes     Some things you could do to make the driving safer if you are experiencing some drowsiness:              -Stop driving and rest              -Call for transportation              -Make sure your sleep disorder is adequately treated     Some things that have been shown NOT to work when experiencing drowsiness while driving:              -Turning on the radio              -Opening windows              -Eating any  distracting  /  entertaining  foods (e.g., sunflower seeds, candy, or any other)              -Talking on the phone      Your decision may not only impact your life, but also the life of others. Please, remember to drive safe for yourself and all of us.
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
## 2022-10-06 ENCOUNTER — TELEPHONE (OUTPATIENT)
Dept: CARDIOLOGY | Facility: CLINIC | Age: 82
End: 2022-10-06

## 2022-10-06 ENCOUNTER — MEDICAL CORRESPONDENCE (OUTPATIENT)
Dept: HEALTH INFORMATION MANAGEMENT | Facility: CLINIC | Age: 82
End: 2022-10-06

## 2022-10-06 DIAGNOSIS — E78.00 HYPERCHOLESTEROLEMIA: ICD-10-CM

## 2022-10-06 RX ORDER — ROSUVASTATIN CALCIUM 20 MG/1
TABLET, COATED ORAL
Qty: 90 TABLET | Refills: 3 | Status: SHIPPED | OUTPATIENT
Start: 2022-10-06 | End: 2022-11-30

## 2022-10-06 NOTE — TELEPHONE ENCOUNTER
PA Initiation    Medication: rosuvastatin (CRESTOR) 10 MG tablet   Insurance Company: [employer] - Phone [phone] Fax [phone]  Pharmacy Filling the Rx: [address] [place] - [address] [place] [address]  Filling Pharmacy Phone: [phone]  Filling Pharmacy Fax: [phone]  Start Date: 10/6/2022

## 2022-10-06 NOTE — TELEPHONE ENCOUNTER
Spoke to pharmacy. Confirmed need order for rosuvastatin 30mg, to be (1) 20 mg dose and (1) 10mg dose to be approved by insurance. Completed request-barbara

## 2022-10-06 NOTE — TELEPHONE ENCOUNTER
----- Message from Nan Latham sent at 10/6/2022 11:48 AM CDT -----  Regarding: NICK PATIENT  General phone call:    Caller: [facility]    Primary cardiologist: NICK    Detailed reason for call: NEED DOSE ADJUSTMENT ON rosuvastatin (CRESTOR) 10 MG tablet, FOR INSURANCE COVERAGE. PLEASE CALL.     Best phone number: [phone]    Best time to contact: ANY    Ok to leave a detailedmessage? YES    Device? NO    Additional Info:

## 2022-10-07 ENCOUNTER — TELEPHONE (OUTPATIENT)
Dept: PALLIATIVE MEDICINE | Facility: OTHER | Age: 82
End: 2022-10-07

## 2022-10-07 ENCOUNTER — TELEPHONE (OUTPATIENT)
Dept: FAMILY MEDICINE | Facility: CLINIC | Age: 82
End: 2022-10-07

## 2022-10-07 ENCOUNTER — MEDICAL CORRESPONDENCE (OUTPATIENT)
Dept: HEALTH INFORMATION MANAGEMENT | Facility: CLINIC | Age: 82
End: 2022-10-07

## 2022-10-07 DIAGNOSIS — I95.89 OTHER SPECIFIED HYPOTENSION: Primary | ICD-10-CM

## 2022-10-07 NOTE — TELEPHONE ENCOUNTER
Received fax request from  pharmacy   zonisamide 25 mg  Last refilled on 9/17/22-30 days with 1 remaining refill  This does not require a new RX at this time

## 2022-10-07 NOTE — TELEPHONE ENCOUNTER
FYI - Status Update    Who is Calling: nurseholly    Update: Reporting that patient has been having low blood pressures with lightheadedness. Patient BP is low today at 83/45. Patient needs prescription for life alert as she lives alone. Patient states that she had it run through insurance and they will cover it if its prescribed and low blood pressure is used for reasoning.     Does caller want a call/response back: Yes     Could we send this information to you in [employer] or would you prefer to receive a phone call?:   Patient would prefer a phone call   Okay to leave a detailed message?: Yes at Cell number on file:    Telephone Information:   Mobile [phone]

## 2022-10-07 NOTE — TELEPHONE ENCOUNTER
Prior Authorization Approval    Authorization Effective Date: 10/5/2022  Authorization Expiration Date:    Medication: rosuvastatin (CRESTOR) 10 MG tablet--APPROVED  Approved Dose/Quantity:   Reference #:     Insurance Company: [employer] - Phone [phone] Fax [phone]  Expected CoPay:       CoPay Card Available:      Foundation Assistance Needed:    Which Pharmacy is filling the prescription (Not needed for infusion/clinic administered): [facility] [place] [address]  Pharmacy Notified: Yes  Patient Notified: Yes **Instructed pharmacy to notify patient when script is ready to /ship.**

## 2022-10-10 NOTE — TELEPHONE ENCOUNTER
Left message to call back for: patient's home care nurse  Information to relay to patient: where should we fax this order?

## 2022-10-11 ENCOUNTER — TELEPHONE (OUTPATIENT)
Dept: FAMILY MEDICINE | Facility: CLINIC | Age: 82
End: 2022-10-11

## 2022-10-11 NOTE — TELEPHONE ENCOUNTER
Spoke with patient. She isn't sure. I provided [facility] [employer] contact number for options. [phone]

## 2022-10-19 DIAGNOSIS — F06.4 ANXIETY DISORDER DUE TO MEDICAL CONDITION: ICD-10-CM

## 2022-10-20 RX ORDER — VENLAFAXINE HYDROCHLORIDE 75 MG/1
CAPSULE, EXTENDED RELEASE ORAL
Qty: 90 CAPSULE | Refills: 0 | OUTPATIENT
Start: 2022-10-20

## 2022-10-20 RX ORDER — VENLAFAXINE HYDROCHLORIDE 150 MG/1
CAPSULE, EXTENDED RELEASE ORAL
Qty: 90 CAPSULE | Refills: 0 | Status: SHIPPED | OUTPATIENT
Start: 2022-10-20 | End: 2022-11-15

## 2022-10-20 NOTE — TELEPHONE ENCOUNTER
"Routing refill request to provider for review/approval because:  Labs out of range:  creatinine    Last Written Prescription Date:  7/1/2022  Last Fill Quantity: 90,  # refills: 0   Last office visit provider:  9/2/2022     Requested Prescriptions   Pending Prescriptions Disp Refills     venlafaxine (EFFEXOR XR) 150 MG 24 hr capsule [Pharmacy Med Name: VENLAFAXINE ER 150MG CAP] 90 capsule 0     Sig: TAKE ONE CAPSULE BY MOUTH EVERY DAY       Serotonin-Norepinephrine Reuptake Inhibitors  Failed - 10/20/2022  9:07 AM        Failed - Normal serum creatinine on file in past 12 months     Recent Labs   Lab Test 09/06/22  1710   CR 1.92*       Ok to refill medication if creatinine is low          Passed - Blood pressure under 140/90 in past 12 months     BP Readings from Last 3 Encounters:   10/04/22 124/60   09/02/22 92/60   08/24/22 95/51                 Passed - Recent (12 mo) or future (30 days) visit within the authorizing provider's specialty     Patient has had an office visit with the authorizing provider or a provider within the authorizing providers department within the previous 12 mos or has a future within next 30 days. See \"Patient Info\" tab in inbasket, or \"Choose Columns\" in Meds & Orders section of the refill encounter.              Passed - Medication is active on med list        Passed - Patient is age 18 or older        Passed - No active pregnancy on record        Passed - No positive pregnancy test in past 12 months          Discontinued on 7/1/2022.        venlafaxine (EFFEXOR XR) 75 MG 24 hr capsule [Pharmacy Med Name: VENLAFAXINE ER 75MG CAP] 90 capsule 0     Sig: TAKE ONE CAPSULE BY MOUTH EVERY DAY       Serotonin-Norepinephrine Reuptake Inhibitors  Failed - 10/20/2022  9:07 AM        Failed - Normal serum creatinine on file in past 12 months     Recent Labs   Lab Test 09/06/22  1710   CR 1.92*       Ok to refill medication if creatinine is low          Passed - Blood pressure under 140/90 in past " "12 months     BP Readings from Last 3 Encounters:   10/04/22 124/60   09/02/22 92/60   08/24/22 95/51                 Passed - Recent (12 mo) or future (30 days) visit within the authorizing provider's specialty     Patient has had an office visit with the authorizing provider or a provider within the authorizing providers department within the previous 12 mos or has a future within next 30 days. See \"Patient Info\" tab in inbasket, or \"Choose Columns\" in Meds & Orders section of the refill encounter.              Passed - Medication is active on med list        Passed - Patient is age 18 or older        Passed - No active pregnancy on record        Passed - No positive pregnancy test in past 12 months             Deonna Daniel RN 10/20/22 9:08 AM  "

## 2022-10-27 ENCOUNTER — MEDICAL CORRESPONDENCE (OUTPATIENT)
Dept: HEALTH INFORMATION MANAGEMENT | Facility: CLINIC | Age: 82
End: 2022-10-27

## 2022-10-27 NOTE — PROGRESS NOTES
[facility]    Outpatient Speech Language Pathology Discharge Note  Patient: Sandy Spencer  : 1940    Beginning/End Dates of Reporting Period:  22 to 22    Referring Provider: Christine Kaye Diagnosis: Cognitive Communication Deficit    Client Self Report: Patient arrived to therapy in good spirits.  She expressed that she has been busy with physical symptoms and appointments since the evaluation so had minimal time to implement fatigue management strategies.  Patient stopped SLP in [facility] prior to session (had gap in appointments between OT/SLP).  Requested to be seen early for shortened visit (other patient scheduled in 25 minutes) vs waiting until scheduled time, so session was shortened.      Goals:  Goal Identifier 1   Goal Description Patient will learn and demonstrate use of x3 cognitive-linguistic compensatory strategies by end of POC.   Target Date 22   Date Met      Progress (detail required for progress note):  Goal initiated, patient only seen for x2 sessions.     Goal Identifier 2   Goal Description Patient will complete functional memory/attention tasks with >90% accuracy with strategy use.   Target Date 22   Date Met      Progress (detail required for progress note):  Goal not addressed, patient only seen for x2 sessions.       Plan:  Discharge from therapy.    Discharge:    Reason for Discharge: Patient was seen for x2 therapy sessions.  She canceled remaining appointments due to being ill and receiving home health services.  Will discharge from  at this time.    Equipment Issued: NA    Discharge Plan: Patient to continue home program.

## 2022-10-27 NOTE — ADDENDUM NOTE
Encounter addended by: Augusta Ramirez, SLP on: 10/27/2022 11:03 AM   Actions taken: Clinical Note Signed, Episode resolved

## 2022-10-31 ENCOUNTER — TELEPHONE (OUTPATIENT)
Dept: FAMILY MEDICINE | Facility: CLINIC | Age: 82
End: 2022-10-31

## 2022-10-31 NOTE — TELEPHONE ENCOUNTER
Forms Request    Name of form/paperwork:  Home HealthCare  Have you been seen for this request:   Do we have the form: placed in provider mailbox  When is form needed by: when complete  How would you like the form returned: fax [phone]  Patient Notified form requests are processed in 3-5 business days: na    Okay to leave a detailed message? na

## 2022-11-01 ENCOUNTER — MEDICAL CORRESPONDENCE (OUTPATIENT)
Dept: HEALTH INFORMATION MANAGEMENT | Facility: CLINIC | Age: 82
End: 2022-11-01

## 2022-11-04 ENCOUNTER — TELEPHONE (OUTPATIENT)
Dept: PALLIATIVE MEDICINE | Facility: OTHER | Age: 82
End: 2022-11-04

## 2022-11-04 NOTE — TELEPHONE ENCOUNTER
[facility] Call Center    Phone Message    May a detailed message be left on voicemail: yes     Reason for Call: Other: Pt called and stated that she is having cancer surgery on her head on Monday 11/07 and she would like Dr. Lynn to prescribe whatever medications she is going to need following the surgery before Monday. Please call pt back with further information.      Action Taken: Message routed to:  Other: [facility]    Travel Screening: Not Applicable

## 2022-11-04 NOTE — TELEPHONE ENCOUNTER
Please advise, patient is asking for medication for post surgical pain.  Appears patient has an upcoming apt: 11/17/22  Can discuss at that time

## 2022-11-07 ENCOUNTER — TELEPHONE (OUTPATIENT)
Dept: PALLIATIVE MEDICINE | Facility: CLINIC | Age: 82
End: 2022-11-07

## 2022-11-07 ENCOUNTER — TRANSFERRED RECORDS (OUTPATIENT)
Dept: HEALTH INFORMATION MANAGEMENT | Facility: CLINIC | Age: 82
End: 2022-11-07

## 2022-11-08 ENCOUNTER — NURSE TRIAGE (OUTPATIENT)
Dept: NURSING | Facility: CLINIC | Age: 82
End: 2022-11-08

## 2022-11-08 NOTE — TELEPHONE ENCOUNTER
The patient called the on call service at [facility]. She had mohs surgery today (dermatology) and is asking for pain medication.     I let her know that we do not manage acute post operative pain and she should call the surgeon to discuss this as they are in charge of managing pain associated with their surgeries.     She asked that I sent Dr. Lynn a note so he could call her tomorrow and I said I would do this.     Alpa Dover MD

## 2022-11-08 NOTE — TELEPHONE ENCOUNTER
Nurse Triage SBAR    Situation:   -Call back     Background:   -Patient calling, It is okay to leave a detailed message at this number.     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 - done thought dermatology consultant (this was skin only, they did not crack the angie and only used local anastetic)  -Four episodes of emisis  at night, and two more times since 4 am   -Waking unsteady - nothing changing   -Patient states she dose not feel confused right now (she feels like her daughter is making a mountain out of a more hill)  -She feels tierd (she has been up since 4 am)  -She is drinking 5 (16 oz) bottles of water a day   -She has urgency, but only urinating 5-6 drops at a time (bladder feels full, she has not emptied her balder since yesterday)  -Legs swelling (ongoing siene being in stage 4 kidney failure) but is worse for the carline few days  -BP was 143/94 (pulse 94)    Recommendation:   Go to ED Now  Patient declined this disposition at this time.  She said she can call her daughter if she decided's to go to the ED tonight.    Call got disconnected before FNA RN was able to give care advice or offer a PCP appointment    FNA RN called back twice with no answer, left messasge to call back.    ANNA BELTRAN RN on 11/8/2022 at 5:40 PM       Reason for Disposition    [1] Unable to urinate (or only a few drops) > 4 hours AND [2] bladder feels very full (e.g., palpable bladder or strong urge to urinate)    Headache or vomiting    Additional Information    Negative: Shock suspected (e.g., cold/pale/clammy skin, too weak to stand, low BP, rapid pulse)    Negative: Sounds like a life-threatening emergency to the triager    Negative: Followed a male genital area injury (e.g., penis, scrotum)    Negative: Pus (white, yellow) or bloody discharge from end of penis    Negative: Followed a female genital area injury (e.g., vagina, vulva)    Negative: Vaginal discharge    Negative: Pain in scrotum is main  symptom    Negative: Pain or itching in the vulvar area    Negative: [1] Pain or burning with passing urine (urination) AND [2] male    Negative: [1] Pain or burning with passing urine (urination) AND [2] female    Negative: Blood in the urine is main symptom    Negative: [1] Pain or burning with passing urine (urination) AND [2] pregnant    Negative: [1] Pain or burning with passing urine (urination) AND [2] postpartum (from 0 to 6 weeks after delivery)    Negative: [1] Taking antibiotic for urinary tract infection (UTI) AND [2] female    Negative: [1] Taking antibiotic for urinary tract infection (UTI) AND [2] male    Negative: Symptoms arising from use of a urinary catheter (e.g., coude, Monae)    Negative: [1] Difficult to awaken or acting confused (e.g., disoriented, slurred speech) AND [2] present now AND [3] diabetes mellitus    Negative: [1] Difficult to awaken or acting confused (e.g., disoriented, slurred speech) AND [2] present now AND [3] new-onset    Negative: [1] Weakness of the face, arm, or leg on one side of the body AND [2] new-onset    Negative: [1] Numbness of the face, arm, or leg on one side of the body AND [2] new-onset    Negative: [1] Loss of speech or garbled speech AND [2] new-onset    Negative: Difficulty breathing or bluish lips    Negative: Shock suspected (e.g., cold/pale/clammy skin, too weak to stand, low BP, rapid pulse)    Negative: Seeing, hearing, or feeling things that are not there (i.e., visual, auditory, or tactile hallucinations)    Negative: Followed a head injury    Negative: Drug overdose suspected    Negative: Sounds like a life-threatening emergency to the triager    Negative: Questions or concerns about alcohol use, unhealthy alcohol use, binge drinking, intoxication, or withdrawal    Negative: Questions or concerns about substance use (drug use), unhealthy drug use, intoxication, or withdrawal    Negative: [1] Diabetes mellitus AND [2] confusion from low blood sugar  (i.e., < 60 mg/dl or 3.5 mmol/l)    Protocols used: URINARY SYMPTOMS-A-AH, CONFUSION - DELIRIUM-A-AH

## 2022-11-08 NOTE — TELEPHONE ENCOUNTER
"Nurse Triage SBAR    Is this a 2nd Level Triage? NO    Situation: Patient's daughter, Viola,  calling about patient having increased confusion and unstable gait for the last two days. Caller is not with patient, but thinks she will be able to answer triage questions if called. Attempted to reach patient, but there was no answer. Left message to call back and speak to any FNA.  Consent: on file in chart    Background: Patient has been more confused and disoriented the last 2 days.     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 Attempted to call patient, but there was no answer. Left message to call back.    Called daughter back to inform her that FNA was unable to reach patient and message was left. Daughter will also reach out to patient to have her call back for triage and advice.    Polly Linares RN [facility] 11/8/2022 2:55 PM    Additional Information    [1] Caller is not with the adult (patient) AND [2] probable NON-URGENT symptoms    Protocols used: INFORMATION ONLY CALL-A-AH      "

## 2022-11-10 ENCOUNTER — TELEPHONE (OUTPATIENT)
Dept: NURSING | Facility: CLINIC | Age: 82
End: 2022-11-10

## 2022-11-10 NOTE — TELEPHONE ENCOUNTER
Patient calling, states her daughter is concerned about her forgetfulness. States she has an appointment tomorrow with the plastic surgeon.  Patient talking about her sister who  from Alzheimers disease. Patient states she is just calling because her Daughter Melania wanted her to call. Patient stated that she didn't have any new concerns. No triage.    CIPRIANO JOY RN

## 2022-11-11 ENCOUNTER — TRANSFERRED RECORDS (OUTPATIENT)
Dept: HEALTH INFORMATION MANAGEMENT | Facility: CLINIC | Age: 82
End: 2022-11-11

## 2022-11-14 ENCOUNTER — TELEPHONE (OUTPATIENT)
Dept: FAMILY MEDICINE | Facility: CLINIC | Age: 82
End: 2022-11-14

## 2022-11-14 DIAGNOSIS — F06.4 ANXIETY DISORDER DUE TO MEDICAL CONDITION: ICD-10-CM

## 2022-11-14 DIAGNOSIS — M54.16 LUMBAR RADICULOPATHY: ICD-10-CM

## 2022-11-14 NOTE — TELEPHONE ENCOUNTER
Forms Request    Name of form/paperwork: Home Health Care  Have you been seen for this request: yes  Do we have the form: placed in provider mailbox  When is form needed by: when complete  How would you like the form returned: fax [phone]  Patient Notified form requests are processed in 3-5 business days: na    Okay to leave a detailed message? na

## 2022-11-15 RX ORDER — VENLAFAXINE HYDROCHLORIDE 75 MG/1
CAPSULE, EXTENDED RELEASE ORAL
Qty: 90 CAPSULE | Refills: 0 | OUTPATIENT
Start: 2022-11-15

## 2022-11-15 RX ORDER — METHOCARBAMOL 500 MG/1
TABLET, FILM COATED ORAL
Qty: 360 TABLET | Refills: 0 | OUTPATIENT
Start: 2022-11-15

## 2022-11-15 RX ORDER — VENLAFAXINE HYDROCHLORIDE 150 MG/1
CAPSULE, EXTENDED RELEASE ORAL
Qty: 90 CAPSULE | Refills: 0 | Status: SHIPPED | OUTPATIENT
Start: 2022-11-15 | End: 2023-04-21

## 2022-11-15 NOTE — TELEPHONE ENCOUNTER
Refused refill requests. Methocarbamol has been discontinued 7/1/2022 and there has been a change in therapy for the venlafaxine.   Deonna Daniel RN   11/15/22 1:36 PM  [facility] Nurse Advisor

## 2022-11-15 NOTE — TELEPHONE ENCOUNTER
"Routing refill request to provider for review/approval because:  Labs out of range:  CR  Early refill request    Last Written Prescription Date:  10/20/2022  Last Fill Quantity: 90,  # refills: 0   Last office visit provider:  9/2/2022     Requested Prescriptions   Pending Prescriptions Disp Refills     venlafaxine (EFFEXOR XR) 150 MG 24 hr capsule [Pharmacy Med Name: VENLAFAXINE ER 150MG CAP] 90 capsule 0     Sig: TAKE ONE CAPSULE BY MOUTH EVERY DAY       Serotonin-Norepinephrine Reuptake Inhibitors  Failed - 11/15/2022  1:17 PM        Failed - Normal serum creatinine on file in past 12 months     Recent Labs   Lab Test 09/06/22  1710   CR 1.92*       Ok to refill medication if creatinine is low          Passed - Blood pressure under 140/90 in past 12 months     BP Readings from Last 3 Encounters:   10/04/22 124/60   09/02/22 92/60   08/24/22 95/51                 Passed - Recent (12 mo) or future (30 days) visit within the authorizing provider's specialty     Patient has had an office visit with the authorizing provider or a provider within the authorizing providers department within the previous 12 mos or has a future within next 30 days. See \"Patient Info\" tab in inbasket, or \"Choose Columns\" in Meds & Orders section of the refill encounter.              Passed - Medication is active on med list        Passed - Patient is age 18 or older        Passed - No active pregnancy on record        Passed - No positive pregnancy test in past 12 months             Deonna Daniel RN 11/15/22 1:17 PM  "

## 2022-11-16 ENCOUNTER — TELEPHONE (OUTPATIENT)
Dept: PALLIATIVE MEDICINE | Facility: OTHER | Age: 82
End: 2022-11-16

## 2022-11-16 ENCOUNTER — TELEPHONE (OUTPATIENT)
Dept: CARDIOLOGY | Facility: CLINIC | Age: 82
End: 2022-11-16

## 2022-11-16 NOTE — TELEPHONE ENCOUNTER
[facility] Call Center    Phone Message    May a detailed message be left on voicemail: yes     Reason for Call: Other: Pt called and stated that she would not be able to come into the clinic tomorrow as she just had a tumor removed. She stated that she would be okay with a virtual visit if it could be changed. Writer is unable to switch the appt without approval. Please call Pt back with further information.      Action Taken: Message routed to:  Other: [facility]    Travel Screening: Not Applicable

## 2022-11-16 NOTE — TELEPHONE ENCOUNTER
[facility] Call Center    Phone Message    May a detailed message be left on voicemail: yes     Reason for Call: Other: Sandy calling to request that Dr. Ybarra fill contact [employer] so that she can get financial assistance for her Entresto prescription. She says she spoke with Daina from [employer] who advised her to speak with Dr. Ybarra about this. Please assist Sandy in the application process.    [employer] can be contacted via phone at [phone], via fax at [phone], and through their website at [email or web address]    Thank you!  Specialty Access Center      Action Taken: Other: Cardiology    Travel Screening: Not Applicable

## 2022-11-17 ENCOUNTER — VIRTUAL VISIT (OUTPATIENT)
Dept: PALLIATIVE MEDICINE | Facility: OTHER | Age: 82
End: 2022-11-17
Attending: ANESTHESIOLOGY
Payer: MEDICARE

## 2022-11-17 ENCOUNTER — TELEPHONE (OUTPATIENT)
Dept: CARDIOLOGY | Facility: CLINIC | Age: 82
End: 2022-11-17

## 2022-11-17 DIAGNOSIS — M19.041 ARTHRITIS OF FINGER OF RIGHT HAND: ICD-10-CM

## 2022-11-17 DIAGNOSIS — G90.523 COMPLEX REGIONAL PAIN SYNDROME TYPE 1 OF BOTH LOWER EXTREMITIES: Primary | ICD-10-CM

## 2022-11-17 PROCEDURE — 99213 OFFICE O/P EST LOW 20 MIN: CPT | Mod: 95 | Performed by: ANESTHESIOLOGY

## 2022-11-17 RX ORDER — LAMOTRIGINE 100 MG/1
100 TABLET ORAL DAILY
Qty: 30 TABLET | Refills: 3 | Status: SHIPPED | OUTPATIENT
Start: 2022-11-17 | End: 2022-11-29

## 2022-11-17 RX ORDER — ZONISAMIDE 25 MG/1
50 CAPSULE ORAL AT BEDTIME
Qty: 60 CAPSULE | Refills: 3 | Status: SHIPPED | OUTPATIENT
Start: 2022-11-17 | End: 2023-06-14

## 2022-11-17 ASSESSMENT — PAIN SCALES - GENERAL: PAINLEVEL: SEVERE PAIN (7)

## 2022-11-17 NOTE — TELEPHONE ENCOUNTER
lvmm for patient regarding call back request.     Pt not on entresto. eliquis is prescribed by Dr. Rees, forms to be completed by pcp as prescribing physician. -JUAN

## 2022-11-17 NOTE — PATIENT INSTRUCTIONS
"PLAN:    discussedyou are planning dermatologic surgery.  You will review with the surgeon that they will manage postoperative pain through the global healing.  They may contact Dr. Lynn to collaborate.  Your primary care provider may also manage this.    Continue the zonisamide 25 mg 2 at bedtime.      Lamotrigine 100 mg 1 daily.    Discussed the use of a \"earthing mat\" or grounding mat\" to help with sleep inflammation and pain.  Resources provided.    Follow-up with Dr. Lynn in 2 to 3 months  "

## 2022-11-17 NOTE — PROGRESS NOTES
"[facility] - Office Visit    ASSESSMENT & PLAN     Sandy was seen today for pain.    Diagnoses and all orders for this visit:    RSD lower limb, bilateral  -     lamoTRIgine (LAMICTAL) 100 MG tablet; Take 1 tablet (100 mg) by mouth daily    Arthritis of finger of right hand  -     zonisamide (ZONEGRAN) 25 MG capsule; Take 2 capsules (50 mg) by mouth At Bedtime        Patient Instructions   PLAN:    Wendy you are planning dermatologic surgery.  You will review with the surgeon that they will manage postoperative pain through the global healing.  They may contact Dr. Fitzpatrick to collaborate.  Your primary care provider may also manage this.    Continue the zonisamide 25 mg 2 at bedtime.      Lamotrigine 100 mg 1 daily.    Discussed the use of a \"earthing mat\" or grounding mat\" to help with sleep inflammation and pain.  Resources provided.    Follow-up with Dr. Fitzpatrick in 2 to 3 months        -----  ARELIS FITZPATRICK MD  [facility]       SUBJECTIVE      Sandy Spencer is a 82 year old year old female seen for a video visit.    Pain problems include chronic regional pain syndrome, migraine headaches.    Reviewing the record had called on 11/7, the on-call pain provider, requesting for pain medicine after a Mohs surgery.  Was informed that the chronic pain clinic does not prescribe pain medicine for postsurgical pain.    Reviews today she did have a surgery dermatologically and will need to have a revision.  Told may need to wear a wig for few months.  Review she had been told to contact the pain clinic for pain medicine.    We discussed that the pain service is not appropriate to prescribe pain medicine postsurgically.  We are not versed and possible complications that could occur.  She will review with the surgeon that the expectation is that the surgeon manage his postoperative pain.  We are happy to collaborate with history and recommendations.    She may also discussed with her " primary care provider if she has a preop next week.    She notes her blood pressure has been varying.    She does continue with lamotrigine 100 mg daily which we have been using to help with some aspects of mood and anxiety and CRPS symptoms.  Is using zonisamide 50 mg at bedtime.      Current Outpatient Medications:      albuterol (PROAIR HFA/PROVENTIL HFA/VENTOLIN HFA) 108 (90 Base) MCG/ACT inhaler, Inhale 2 puffs into the lungs every 6 hours, Disp: 18 g, Rfl: 4     allopurinol (ZYLOPRIM) 100 MG tablet, Take 1 tablet (100 mg) by mouth 2 times daily (Patient taking differently: Take 200 mg by mouth daily), Disp: 180 tablet, Rfl: 1     azelastine (ASTELIN) 0.1 % nasal spray, 2 sprays each nostril 1-2x daily as needed for nasal congestion (use nightly for first 2 week), Disp: 30 mL, Rfl: 11     carvedilol (COREG) 3.125 MG tablet, Take 3.125 mg by mouth 2 times daily, Disp: , Rfl:      Cholecalciferol (VITAMIN D-3) 125 MCG (5000 UT) TABS, Take 5,000 Units by mouth daily, Disp: , Rfl:      HEMP OIL OR EXTRACT OR OTHER CBD CANNABINOID, NOT MEDICAL CANNABIS,, 50 mg At Bedtime Delta 8 Gummies, Disp: , Rfl:      lamoTRIgine (LAMICTAL) 100 MG tablet, Take 1 tablet (100 mg) by mouth daily, Disp: 30 tablet, Rfl: 3     levothyroxine (SYNTHROID/LEVOTHROID) 50 MCG tablet, Take 1 tablet (50 mcg) by mouth daily, Disp: 90 tablet, Rfl: 3     rosuvastatin (CRESTOR) 20 MG tablet, Take 1 tablet (20 mg) by mouth daily AND 0.5 tablets (10 mg) daily., Disp: 90 tablet, Rfl: 3     traZODone (DESYREL) 100 MG tablet, TAKE 3 TO 4 TABLETS BY MOUTH AT BEDTIME (Patient taking differently: Take 400 mg by mouth At Bedtime), Disp: 360 tablet, Rfl: 1     venlafaxine (EFFEXOR XR) 150 MG 24 hr capsule, TAKE ONE CAPSULE BY MOUTH EVERY DAY, Disp: 90 capsule, Rfl: 0     venlafaxine (EFFEXOR XR) 37.5 MG 24 hr capsule, Take 1 capsule (37.5 mg) by mouth daily In addition to 150 mg daily (total 187.5 mg/day), Disp: 90 capsule, Rfl: 1     zonisamide (ZONEGRAN)  "25 MG capsule, Take 2 capsules (50 mg) by mouth At Bedtime, Disp: 60 capsule, Rfl: 3      Review of Systems   General, psych, musculoskeletal, bowels and bladder otherwise normal other than above.          OBJECTIVE        Physical Exam  General: Alert, clear sensorium.  Cardiovascular: Normal rate  Lungs: Pulmonary effort is normal, speaking in full sentences  MSK:   Skin:. No concerning rashes or lesions.  Neurologic: No focal deficit, alert and oriented x3  Psychiatric: Affect congruent      Assessment: Chronic pain is included chronic regional pain syndrome, migraine headaches.  More recently dealing with dermatologic surgeries.    She review with her primary care provider and with the surgical PA whom she is meeting with the expectations for pain management.    I did review the concept of an \" earthing mat\" which we have been discussing with patient's.  She will look into resources.    Total time more than 20 minutes.    Video start time 11: 10.  Video end time 11: 22.  Patient at home via [employer]    "

## 2022-11-17 NOTE — PROGRESS NOTES
Patient presents to the clinic today for a follow up with ARELIS FITZPATRICK MD  regarding Pain Management.     Sandy is a 82 year old who is being evaluated via a billable video visit.      How would you like to obtain your AVS? Mail a copy  If the video visit is dropped, the invitation should be resent by: Text to cell phone: [phone]  Will anyone else be joining your video visit? No        Video-Visit Details    Video Start Time:     Type of service:  Video Visit    Video End Time:    Originating Location (pt. Location):         Distant Location (provider location):      Platform used for Video Visit: AmWell         Is Pt currently in [place]? Yes    NOTE:  If Pt is not in [place], Appointment needs to be canceled and rescheduled     UDT/CSA = 5/4/22        Sammie Go MA  [facility] [place]

## 2022-11-17 NOTE — TELEPHONE ENCOUNTER
Spoke with patient regarding call back request. Discussed no rx for entresto and eliquis was ordered by Dr. Rees, discussed forms needed for grants to be completed by ordering provider. . -JUAN

## 2022-11-17 NOTE — TELEPHONE ENCOUNTER
[facility] Call Center    Phone Message    May a detailed message be left on voicemail: yes     Reason for Call: Other: Patient calling to speak with a nurse. She did not want to speak with specialty access center but mentioned needing a refill of Eliquis as she is totally out of the med. When asked if this is related to the [employer] request she called about yesterday, she said she did not make a call yesterday. Please call her back to discuss.    Thank you!  Specialty Access Center        Action Taken: Other: Cardiology    Travel Screening: Not Applicable

## 2022-11-29 ENCOUNTER — OFFICE VISIT (OUTPATIENT)
Dept: FAMILY MEDICINE | Facility: CLINIC | Age: 82
End: 2022-11-29
Payer: MEDICARE

## 2022-11-29 VITALS
BODY MASS INDEX: 26.22 KG/M2 | OXYGEN SATURATION: 97 % | SYSTOLIC BLOOD PRESSURE: 108 MMHG | TEMPERATURE: 98.4 F | RESPIRATION RATE: 14 BRPM | HEART RATE: 82 BPM | HEIGHT: 63 IN | DIASTOLIC BLOOD PRESSURE: 60 MMHG | WEIGHT: 148 LBS

## 2022-11-29 DIAGNOSIS — Z01.818 PRE-OP EXAM: Primary | ICD-10-CM

## 2022-11-29 DIAGNOSIS — I15.1 HYPERTENSION SECONDARY TO OTHER RENAL DISORDERS: ICD-10-CM

## 2022-11-29 DIAGNOSIS — C44.41 BASAL CELL CARCINOMA (BCC) OF SCALP: ICD-10-CM

## 2022-11-29 DIAGNOSIS — I25.119 CORONARY ARTERY DISEASE INVOLVING NATIVE CORONARY ARTERY OF NATIVE HEART WITH ANGINA PECTORIS (H): ICD-10-CM

## 2022-11-29 DIAGNOSIS — G89.4 CHRONIC PAIN SYNDROME: ICD-10-CM

## 2022-11-29 DIAGNOSIS — I27.82 OTHER CHRONIC PULMONARY EMBOLISM WITHOUT ACUTE COR PULMONALE (H): ICD-10-CM

## 2022-11-29 DIAGNOSIS — E78.00 HYPERCHOLESTEROLEMIA: ICD-10-CM

## 2022-11-29 LAB
ALBUMIN SERPL BCG-MCNC: 4.4 G/DL (ref 3.5–5.2)
ALP SERPL-CCNC: 73 U/L (ref 35–104)
ALT SERPL W P-5'-P-CCNC: 15 U/L (ref 10–35)
ANION GAP SERPL CALCULATED.3IONS-SCNC: 14 MMOL/L (ref 7–15)
AST SERPL W P-5'-P-CCNC: 24 U/L (ref 10–35)
ATRIAL RATE - MUSE: 71 BPM
BILIRUB DIRECT SERPL-MCNC: <0.2 MG/DL (ref 0–0.3)
BILIRUB SERPL-MCNC: 0.4 MG/DL
BUN SERPL-MCNC: 16 MG/DL (ref 8–23)
CALCIUM SERPL-MCNC: 9.1 MG/DL (ref 8.8–10.2)
CHLORIDE SERPL-SCNC: 97 MMOL/L (ref 98–107)
CHOLEST SERPL-MCNC: 229 MG/DL
CREAT SERPL-MCNC: 1.66 MG/DL (ref 0.51–0.95)
DEPRECATED HCO3 PLAS-SCNC: 26 MMOL/L (ref 22–29)
DIASTOLIC BLOOD PRESSURE - MUSE: NORMAL MMHG
ERYTHROCYTE [DISTWIDTH] IN BLOOD BY AUTOMATED COUNT: 13.6 % (ref 10–15)
GFR SERPL CREATININE-BSD FRML MDRD: 31 ML/MIN/1.73M2
GLUCOSE SERPL-MCNC: 90 MG/DL (ref 70–99)
HCT VFR BLD AUTO: 35.4 % (ref 35–47)
HDLC SERPL-MCNC: 102 MG/DL
HGB BLD-MCNC: 11.4 G/DL (ref 11.7–15.7)
INTERPRETATION ECG - MUSE: NORMAL
LDLC SERPL CALC-MCNC: 102 MG/DL
MCH RBC QN AUTO: 32.4 PG (ref 26.5–33)
MCHC RBC AUTO-ENTMCNC: 32.2 G/DL (ref 31.5–36.5)
MCV RBC AUTO: 101 FL (ref 78–100)
NONHDLC SERPL-MCNC: 127 MG/DL
P AXIS - MUSE: NORMAL DEGREES
PLATELET # BLD AUTO: 176 10E3/UL (ref 150–450)
POTASSIUM SERPL-SCNC: 3.5 MMOL/L (ref 3.4–5.3)
PR INTERVAL - MUSE: NORMAL MS
PROT SERPL-MCNC: 6.9 G/DL (ref 6.4–8.3)
QRS DURATION - MUSE: 96 MS
QT - MUSE: 420 MS
QTC - MUSE: 456 MS
R AXIS - MUSE: 165 DEGREES
RBC # BLD AUTO: 3.52 10E6/UL (ref 3.8–5.2)
SODIUM SERPL-SCNC: 137 MMOL/L (ref 136–145)
SYSTOLIC BLOOD PRESSURE - MUSE: NORMAL MMHG
T AXIS - MUSE: 110 DEGREES
TRIGL SERPL-MCNC: 124 MG/DL
VENTRICULAR RATE- MUSE: 71 BPM
WBC # BLD AUTO: 4.6 10E3/UL (ref 4–11)

## 2022-11-29 PROCEDURE — 80053 COMPREHEN METABOLIC PANEL: CPT | Performed by: FAMILY MEDICINE

## 2022-11-29 PROCEDURE — 82248 BILIRUBIN DIRECT: CPT | Performed by: FAMILY MEDICINE

## 2022-11-29 PROCEDURE — 36415 COLL VENOUS BLD VENIPUNCTURE: CPT | Performed by: FAMILY MEDICINE

## 2022-11-29 PROCEDURE — 80061 LIPID PANEL: CPT | Performed by: FAMILY MEDICINE

## 2022-11-29 PROCEDURE — 85027 COMPLETE CBC AUTOMATED: CPT | Performed by: FAMILY MEDICINE

## 2022-11-29 PROCEDURE — 99215 OFFICE O/P EST HI 40 MIN: CPT | Performed by: FAMILY MEDICINE

## 2022-11-29 PROCEDURE — 93005 ELECTROCARDIOGRAM TRACING: CPT | Performed by: FAMILY MEDICINE

## 2022-11-29 PROCEDURE — 93010 ELECTROCARDIOGRAM REPORT: CPT | Mod: OFF | Performed by: INTERNAL MEDICINE

## 2022-11-29 RX ORDER — TRAZODONE HYDROCHLORIDE 100 MG/1
400 TABLET ORAL AT BEDTIME
Qty: 360 TABLET | Refills: 1 | Status: SHIPPED | OUTPATIENT
Start: 2022-11-29 | End: 2023-04-28

## 2022-11-29 RX ORDER — METHOCARBAMOL 500 MG/1
500 TABLET, FILM COATED ORAL 2 TIMES DAILY
COMMUNITY
Start: 2022-11-16 | End: 2023-01-24

## 2022-11-29 RX ORDER — CARVEDILOL 3.12 MG/1
3.12 TABLET ORAL 2 TIMES DAILY
Qty: 180 TABLET | Refills: 3 | Status: SHIPPED | OUTPATIENT
Start: 2022-11-29 | End: 2023-02-21

## 2022-11-29 RX ORDER — TORSEMIDE 10 MG/1
10 TABLET ORAL DAILY
Qty: 90 TABLET | Refills: 3 | Status: SHIPPED | OUTPATIENT
Start: 2022-11-29 | End: 2023-06-23

## 2022-11-29 RX ORDER — TORSEMIDE 10 MG/1
10 TABLET ORAL DAILY
COMMUNITY
Start: 2022-10-06 | End: 2022-11-29

## 2022-11-29 ASSESSMENT — PATIENT HEALTH QUESTIONNAIRE - PHQ9
SUM OF ALL RESPONSES TO PHQ QUESTIONS 1-9: 9
10. IF YOU CHECKED OFF ANY PROBLEMS, HOW DIFFICULT HAVE THESE PROBLEMS MADE IT FOR YOU TO DO YOUR WORK, TAKE CARE OF THINGS AT HOME, OR GET ALONG WITH OTHER PEOPLE: SOMEWHAT DIFFICULT
SUM OF ALL RESPONSES TO PHQ QUESTIONS 1-9: 9

## 2022-11-29 NOTE — LETTER
November 30, 2022    Sandy Spencer  [address] [place] [address]    Dear ,    We are writing to inform you of your test results. Your hemoglobin is just under normal but overall pretty stable. Your electrolytes are back to normal. Your kidneys slightly improved. Overall, things are stable.     Resulted Orders   CBC with platelets   Result Value Ref Range    WBC Count 4.6 4.0 - 11.0 10e3/uL    RBC Count 3.52 (L) 3.80 - 5.20 10e6/uL    Hemoglobin 11.4 (L) 11.7 - 15.7 g/dL    Hematocrit 35.4 35.0 - 47.0 %     (H) 78 - 100 fL    MCH 32.4 26.5 - 33.0 pg    MCHC 32.2 31.5 - 36.5 g/dL    RDW 13.6 10.0 - 15.0 %    Platelet Count 176 150 - 450 10e3/uL   Basic metabolic panel   Result Value Ref Range    Sodium 137 136 - 145 mmol/L    Potassium 3.5 3.4 - 5.3 mmol/L    Chloride 97 (L) 98 - 107 mmol/L    Carbon Dioxide (CO2) 26 22 - 29 mmol/L    Anion Gap 14 7 - 15 mmol/L    Urea Nitrogen 16.0 8.0 - 23.0 mg/dL    Creatinine 1.66 (H) 0.51 - 0.95 mg/dL    Calcium 9.1 8.8 - 10.2 mg/dL    Glucose 90 70 - 99 mg/dL    GFR Estimate 31 (L) >60 mL/min/1.73m2      Comment:      Effective December 21, 2021 eGFRcr in adults is calculated using the 2021 CKD-EPI creatinine equation which includes age and gender (Soni et al., NEJM, DOI: 10.1056/JCSUdl8609192)   If you have any questions or concerns, please call the clinic at the number listed above.    Sincerely,    Caitlyn Rees MD

## 2022-11-29 NOTE — PATIENT INSTRUCTIONS
Preparing for Your Surgery  Getting started  A nurse will call you to review your health history and instructions. They will give you an arrival time based on your scheduled surgery time. Please be ready to share:    Your doctor s clinic name and phone number    Your medical, surgical, and anesthesia history    A list of allergies and sensitivities    A list of medicines, including herbal treatments and over-the-counter drugs    Whether the patient has a legal guardian (ask how to send us the papers in advance)  Please tell us if you re pregnant--or if there s any chance you might be pregnant. Some surgeries may injure a fetus (unborn baby), so they require a pregnancy test. Surgeries that are safe for a fetus don t always need a test, and you can choose whether to have one.   If you have a child who s having surgery, please ask for a copy of Preparing for Your Child s Surgery.    Preparing for surgery    Within 10 to 30 days of surgery: Have a pre-op exam (sometimes called an H&P, or History and Physical). This can be done at a clinic or pre-operative center.  ? If you re having a , you may not need this exam. Talk to your care team.    At your pre-op exam, talk to your care team about all medicines you take. If you need to stop any medicines before surgery, ask when to start taking them again.  ? We do this for your safety. Many medicines can make you bleed too much during surgery. Some change how well surgery (anesthesia) drugs work.    Call your insurance company to let them know you re having surgery. (If you don t have insurance, call [phone].)    Call your clinic if there s any change in your health. This includes signs of a cold or flu (sore throat, runny nose, cough, rash, fever). It also includes a scrape or scratch near the surgery site.    If you have questions on the day of surgery, call your hospital or surgery center.  COVID testing  You may need to be tested for COVID-19 before having  surgery. If so, we will give you instructions (or click here).  Eating and drinking guidelines  For your safety: Unless your surgeon tells you otherwise, follow the guidelines below.    Eat and drink as usual until 8 hours before you arrive for surgery. After that, no food or milk.    Drink clear liquids until 2 hours before you arrive. These are liquids you can see through, like water, Gatorade, and Propel Water. They also include plain black coffee and tea (no cream or milk), candy, and breath mints. You can spit out gum when you arrive.    If you drink alcohol: Stop drinking it the night before surgery.    If your care team tells you to take medicine on the morning of surgery, it s okay to take it with a sip of water.  Preventing infection    Shower or bathe the night before and morning of your surgery. Follow the instructions your clinic gave you. (If no instructions, use regular soap.)    Don t shave or clip hair near your surgery site. We ll remove the hair if needed.    Don t smoke or vape the morning of surgery. You may chew nicotine gum up to 2 hours before surgery. A nicotine patch is okay.  ? Note: Some surgeries require you to completely quit smoking and nicotine. Check with your surgeon.    Your care team will make every effort to keep you safe from infection. We will:  ? Clean our hands often with soap and water (or an alcohol-based hand rub).  ? Clean the skin at your surgery site with a special soap that kills germs.  ? Give you a special gown to keep you warm. (Cold raises the risk of infection.)  ? Wear special hair covers, masks, gowns and gloves during surgery.  ? Give antibiotic medicine, if prescribed. Not all surgeries need antibiotics.  What to bring on the day of surgery    Photo ID and insurance card    Copy of your health care directive, if you have one    Glasses and hearing aids (bring cases)  ? You can t wear contacts during surgery    Inhaler and eye drops, if you use them (tell us  about these when you arrive)    CPAP machine or breathing device, if you use them    A few personal items, if spending the night    If you have . . .  ? A pacemaker, ICD (cardiac defibrillator) or other implant: Bring the ID card.  ? An implanted stimulator: Bring the remote control.  ? A legal guardian: Bring a copy of the certified (court-stamped) guardianship papers.  Please remove any jewelry, including body piercings. Leave jewelry and other valuables at home.  If you re going home the day of surgery    You must have a responsible adult drive you home. They should stay with you overnight as well.    If you don t have someone to stay with you, and you aren t safe to go home alone, we may keep you overnight. Insurance often won t pay for this.  After surgery  If it s hard to control your pain or you need more pain medicine, please call your surgeon s office.  Questions?   If you have any questions for your care team, list them here:   ____________________________________________________________________________________________________________________________________________________________________________________________________________________________________________________________________  For informational purposes only. Not to replace the advice of your health care provider. Copyright   2003, 2019 [employer]. All rights reserved. Clinically reviewed by Shazia Dimas MD. [employer] [id number] - REV 10/22.    How to Take Your Medication Before Surgery  - Take all of your medications before surgery except as noted below  - HOLD (do not take) your Torsemide on the morning of surgery.   - HOLD (do not take) Eliquis for 3 days prior to surgery, ok to start the day after surgery
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
## 2022-11-30 ENCOUNTER — LAB (OUTPATIENT)
Dept: CARDIOLOGY | Facility: CLINIC | Age: 82
End: 2022-11-30
Payer: MEDICARE

## 2022-11-30 DIAGNOSIS — E78.00 HYPERCHOLESTEROLEMIA: ICD-10-CM

## 2022-11-30 RX ORDER — ROSUVASTATIN CALCIUM 40 MG/1
40 TABLET, COATED ORAL DAILY
Qty: 90 TABLET | Refills: 3 | Status: SHIPPED | OUTPATIENT
Start: 2022-11-30 | End: 2023-08-04 | Stop reason: DRUGHIGH

## 2022-11-30 RX ORDER — ROSUVASTATIN CALCIUM 40 MG/1
40 TABLET, COATED ORAL DAILY
Qty: 90 TABLET | Refills: 3
Start: 2022-11-30 | End: 2022-11-30

## 2022-11-30 NOTE — TELEPHONE ENCOUNTER
Pt seen in clinic today. She made repeat lab appt in error. She needs to have lipids in 2 months after increasing Crestor. We discussed her lipids from today and [employer] message. She understands now. She requested a refill of the crestor 40 mg. This was sent in. -[facility] [place]

## 2022-12-05 ASSESSMENT — ENCOUNTER SYMPTOMS
SLEEP DISTURBANCES DUE TO BREATHING: 0
TINGLING: 0
DIZZINESS: 1
BACK PAIN: 1
PALPITATIONS: 1
EXERCISE INTOLERANCE: 1
HEADACHES: 1
DECREASED CONCENTRATION: 0
MUSCLE CRAMPS: 0
STIFFNESS: 1
ORTHOPNEA: 0
MUSCLE WEAKNESS: 1
SWOLLEN GLANDS: 0
NERVOUS/ANXIOUS: 0
SKIN CHANGES: 0
NUMBNESS: 0
POOR WOUND HEALING: 1
HYPOTENSION: 1
WEAKNESS: 1
ARTHRALGIAS: 1
LOSS OF CONSCIOUSNESS: 0
HYPERTENSION: 1
TREMORS: 0
NECK PAIN: 1
BRUISES/BLEEDS EASILY: 1
JOINT SWELLING: 1
MYALGIAS: 1
LIGHT-HEADEDNESS: 1
DEPRESSION: 1
INSOMNIA: 1
SPEECH CHANGE: 0
MEMORY LOSS: 0
LEG PAIN: 0
SEIZURES: 0
SYNCOPE: 0
PARALYSIS: 0
PANIC: 0
DISTURBANCES IN COORDINATION: 0
NAIL CHANGES: 1

## 2022-12-08 ENCOUNTER — OFFICE VISIT (OUTPATIENT)
Dept: ENDOCRINOLOGY | Facility: CLINIC | Age: 82
End: 2022-12-08
Attending: FAMILY MEDICINE
Payer: MEDICARE

## 2022-12-08 VITALS — DIASTOLIC BLOOD PRESSURE: 66 MMHG | WEIGHT: 149 LBS | SYSTOLIC BLOOD PRESSURE: 110 MMHG | BODY MASS INDEX: 26.39 KG/M2

## 2022-12-08 DIAGNOSIS — M81.0 OSTEOPOROSIS, UNSPECIFIED OSTEOPOROSIS TYPE, UNSPECIFIED PATHOLOGICAL FRACTURE PRESENCE: ICD-10-CM

## 2022-12-08 PROCEDURE — 99213 OFFICE O/P EST LOW 20 MIN: CPT | Performed by: NURSE PRACTITIONER

## 2022-12-08 NOTE — LETTER
12/8/2022         RE: Sandy Spencer  [address]  [facility] [address]        Dear Colleague,    Thank you for referring your patient, Sandy Spencer, to the [facility]. Please see a copy of my visit note below.    [facility]  ENDOCRINOLOGY    Osteoporosis Follow Up 12/8/2022    Sandy Spencer, 1940, [id number]          Reason for visit      1. Osteoporosis, unspecified osteoporosis type, unspecified pathological fracture presence        History     Sandy Spencer is a very pleasant 82 year old old female who presents for follow up.   SUMMARY:    Sandy is here today in f/u for what has now been labeled, Osteoporosis. She was being worked up for Abdominal pain, and Osteoporosis was noted.  Pt's previous Dexa Scan of 2021 showed Osteopenic T scores in bilateral hips. Because of artifacts in the Spine, her score was artificially elevated. She has not been on any treatment since 2019, when we attempted to get a PA for the Prolia and she was rejected by Medicare. She has CKF, and is unable to take anything in the Bisphosphonate family of medications. She reports today that she is taking 4000 international unit(s) of Vit D daily.  We do not have a current level. Her Calcium level is 9.1.       Risk Factors     The following high- risk conditions have been ruled out: celiac disease, eating disorders, gastric bypass, hyperparathyroidism, inflammatory bowel disease, hyperthyroidism, rheumatoid arthritis, lupus, chronic kidney disease.     Sandy Spencer has the following risk factors: Age, Female gender,  and Family history of osteoporosis     She is not on high risk medications such as glucocorticoids, anti-coagulants, anti-convulsants, chemotherapy.    Patient deniesBreast cancer and Family history of breast cancer.      Past Medical History     Patient Active Problem List   Diagnosis     Focal glomerular sclerosis     RSD lower limb, bilateral     ADD  (attention deficit disorder)     RSD upper limb, right     Osteopenia     Major depression     Hypertension     Insomnia     Hypercholesterolemia     Right rotator cuff tear     Cluster headaches     Lumbar radiculopathy     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     Temporomandibular joint-pain-dysfunction syndrome (TMJ)     Hypothyroidism     Chronic pain     Snoring     Generalized abdominal pain     Bilateral carotid artery stenosis     Coronary artery disease involving native coronary artery of native heart with angina pectoris (H)     Other chronic pulmonary embolism without acute cor pulmonale (H)     Hematuria     Hydronephrosis     Bladder spasms     Anxiety disorder due to medical condition     Family relationship problem     History of posttraumatic stress disorder (PTSD)     Generalized muscle weakness     Myofascial pain     Moderate major depression (H)     Elevated lipase     Abdominal bloating     Acquired absence of other specified parts of digestive tract     Ampullary stenosis     Obstruction of biliary tree     Anemia     Aphthous ulcer of ileum     Disorder of phosphorus metabolism     Edema     Gastroesophageal reflux disease without esophagitis     Obstruction of common bile duct     Overflow diarrhea     History of partial colectomy     Reflex sympathetic dystrophy     Solitary left kidney     Flank pain     Hypocitraturia     Primary osteoarthritis of both knees     Iron deficiency anemia     Proteinuria     Concussion with loss of consciousness     Schizoaffective disorder (H)     Muscle weakness (generalized)     Shuffling gait     Falls frequently     Long term current use of anticoagulant therapy     COPD (chronic obstructive pulmonary disease) (H)     CKD (chronic kidney disease) stage 4, GFR 15-29 ml/min (H)     Degeneration of cervical intervertebral disc     Derangement of meniscus     Intractable chronic migraine without aura     Occipital neuralgia     Post-traumatic  headache     S/p nephrectomy     Sciatic neuropathy     Pain in right knee     Leg pain, bilateral     Cervical radiculopathy     Spinal stenosis of cervical region     Fibrositis of neck     Hypokalemia     Diarrhea, unspecified type     Hypotension due to hypovolemia       Family History       family history includes Alzheimer Disease in her sister; Aortic aneurysm in her mother; Cerebrovascular Disease in her father; Dementia in her maternal grandmother, mother, and sister; Heart Disease in her father and mother; Kidney Disease in her father and mother; Other - See Comments in her brother; Sleep Apnea in her sister.    Social History      reports that she quit smoking about 22 years ago. Her smoking use included cigarettes. She has a 20.00 pack-year smoking history. She has never used smokeless tobacco. She reports that she does not drink alcohol and does not use drugs.      Review of Systems     Patient denies current pain, limited mobility, fractures.   Remainder per HPI.      Vital Signs     /66 (BP Location: Right arm, Patient Position: Sitting, Cuff Size: Adult Regular)   Wt 67.6 kg (149 lb)   LMP  (LMP Unknown)   BMI 26.39 kg/m      Physical Exam     Constitutional:  Well developed, Well nourished  HENT:  Normocephalic,   Neck: normal in appearance  Eyes:  PERRL, Conjunctiva pink  Respiratory:  No respiratory distress  Skin: No acanthosis nigricans, lipoatrophy or lipodystrophy  Neurologic:  Alert & oriented x 3, nonfocal  Psychiatric:  Affect, Mood, Insight appropriate        Assessment     1. Osteoporosis, unspecified osteoporosis type, unspecified pathological fracture presence        Plan     With her diagnosis of Osteoporosis, we are hopeful that we will be able to get her back on the Prolia. We will apply for a PA. I will see her in 1 year after 2 injections, and we will plan on another Dexa Scan at that time.         Caroline Sumner NP  [facility]  12/8/2022  1:03 PM    Current  Medications     Outpatient Medications Prior to Visit   Medication Sig Dispense Refill     albuterol (PROAIR HFA/PROVENTIL HFA/VENTOLIN HFA) 108 (90 Base) MCG/ACT inhaler Inhale 2 puffs into the lungs every 6 hours 18 g 4     allopurinol (ZYLOPRIM) 100 MG tablet Take 1 tablet (100 mg) by mouth 2 times daily (Patient taking differently: Take 200 mg by mouth daily) 180 tablet 1     apixaban ANTICOAGULANT (ELIQUIS) 5 MG tablet Take 1 tablet (5 mg) by mouth 2 times daily 180 tablet 3     azelastine (ASTELIN) 0.1 % nasal spray 2 sprays each nostril 1-2x daily as needed for nasal congestion (use nightly for first 2 week) 30 mL 11     carvedilol (COREG) 3.125 MG tablet Take 1 tablet (3.125 mg) by mouth 2 times daily 180 tablet 3     Cholecalciferol (VITAMIN D-3) 125 MCG (5000 UT) TABS Take 5,000 Units by mouth daily       HEMP OIL OR EXTRACT OR OTHER CBD CANNABINOID, NOT MEDICAL CANNABIS, 50 mg At Bedtime Delta 8 Gummies       lamoTRIgine (LAMICTAL) 25 MG tablet Take 1 tablet (25 mg) by mouth 2 times daily 180 tablet 1     levothyroxine (SYNTHROID/LEVOTHROID) 50 MCG tablet Take 1 tablet (50 mcg) by mouth daily 90 tablet 3     methocarbamol (ROBAXIN) 500 MG tablet Take 500 mg by mouth 2 times daily       rosuvastatin (CRESTOR) 40 MG tablet Take 1 tablet (40 mg) by mouth daily 90 tablet 3     torsemide (DEMADEX) 10 MG tablet Take 1 tablet (10 mg) by mouth daily 90 tablet 3     traZODone (DESYREL) 100 MG tablet Take 4 tablets (400 mg) by mouth At Bedtime 360 tablet 1     venlafaxine (EFFEXOR XR) 150 MG 24 hr capsule TAKE ONE CAPSULE BY MOUTH EVERY DAY 90 capsule 0     venlafaxine (EFFEXOR XR) 37.5 MG 24 hr capsule Take 1 capsule (37.5 mg) by mouth daily In addition to 150 mg daily (total 187.5 mg/day) 90 capsule 1     zonisamide (ZONEGRAN) 25 MG capsule Take 2 capsules (50 mg) by mouth At Bedtime 60 capsule 3     No facility-administered medications prior to visit.         Lab Results     TSH   Date Value Ref Range Status    06/14/2022 0.60 0.30 - 5.00 uIU/mL Final     Phosphorus   Date Value Ref Range Status   09/06/2022 4.1 2.5 - 4.5 mg/dL Final     Iron   Date Value Ref Range Status   09/06/2022 65 37 - 145 ug/dL Final           Imaging Results   Last DEXA scan:  No valid procedures specified.    Answers for HPI/ROS submitted by the patient on 12/5/2022  General Symptoms: No  Skin Symptoms: Yes  HENT Symptoms: No  EYE SYMPTOMS: No  HEART SYMPTOMS: Yes  LUNG SYMPTOMS: No  INTESTINAL SYMPTOMS: No  URINARY SYMPTOMS: No  GYNECOLOGIC SYMPTOMS: No  BREAST SYMPTOMS: No  SKELETAL SYMPTOMS: Yes  BLOOD SYMPTOMS: Yes  NERVOUS SYSTEM SYMPTOMS: Yes  MENTAL HEALTH SYMPTOMS: Yes  Changes in hair: No  Changes in moles/birth marks: No  Itching: Yes  Rashes: No  Changes in nails: Yes  Acne: No  Hair in places you don't want it: No  Change in facial hair: No  Warts: No  Non-healing sores: Yes  Scarring: No  Flaking of skin: Yes  Color changes of hands/feet in cold : No  Sun sensitivity: Yes  Skin thickening: No  Chest pain or pressure: No  Fast or irregular heartbeat: Yes  Pain in legs with walking: No  Trouble breathing while lying down: No  Fingers or toes appear blue: No  High blood pressure: Yes  Low blood pressure: Yes  Fainting: No  Murmurs: No  Pacemaker: No  Varicose veins: No  Wake up at night with shortness of breath: No  Light-headedness: Yes  Exercise intolerance: Yes  Back pain: Yes  Muscle aches: Yes  Neck pain: Yes  Swollen joints: Yes  Joint pain: Yes  Bone pain: Yes  Muscle cramps: No  Muscle weakness: Yes  Joint stiffness: Yes  Bone fracture: Yes  Edema or swelling: Yes  Anemia: No  Swollen glands: No  Easy bleeding or bruising: Yes  Trouble with coordination: No  Dizziness or trouble with balance: Yes  Fainting or black-out spells: No  Memory loss: No  Headache: Yes  Seizures: No  Speech problems: No  Tingling: No  Tremor: No  Weakness: Yes  Difficulty walking: No  Paralysis: No  Numbness: No  Nervous or Anxious: No  Depression:  Yes  Trouble sleeping: Yes  Trouble thinking or concentrating: No  Mood changes: No  Panic attacks: No          Again, thank you for allowing me to participate in the care of your patient.        Sincerely,        Caroline Sumner NP

## 2022-12-08 NOTE — PROGRESS NOTES
[facility]  ENDOCRINOLOGY    Osteoporosis Follow Up 12/8/2022    Sandy Spencer, 1940, [id number]          Reason for visit      1. Osteoporosis, unspecified osteoporosis type, unspecified pathological fracture presence        History     Sandy Spencer is a very pleasant 82 year old old female who presents for follow up.   SUMMARY:    Sandy is here today in f/u for what has now been labeled, Osteoporosis. She was being worked up for Abdominal pain, and Osteoporosis was noted.  Pt's previous Dexa Scan of 2021 showed Osteopenic T scores in bilateral hips. Because of artifacts in the Spine, her score was artificially elevated. She has not been on any treatment since 2019, when we attempted to get a PA for the Prolia and she was rejected by Medicare. She has CKF, and is unable to take anything in the Bisphosphonate family of medications. She reports today that she is taking 4000 international unit(s) of Vit D daily.  We do not have a current level. Her Calcium level is 9.1.       Risk Factors     The following high- risk conditions have been ruled out: celiac disease, eating disorders, gastric bypass, hyperparathyroidism, inflammatory bowel disease, hyperthyroidism, rheumatoid arthritis, lupus, chronic kidney disease.     Sandy Spencer has the following risk factors: Age, Female gender,  and Family history of osteoporosis     She is not on high risk medications such as glucocorticoids, anti-coagulants, anti-convulsants, chemotherapy.    Patient deniesBreast cancer and Family history of breast cancer.      Past Medical History     Patient Active Problem List   Diagnosis     Focal glomerular sclerosis     RSD lower limb, bilateral     ADD (attention deficit disorder)     RSD upper limb, right     Osteopenia     Major depression     Hypertension     Insomnia     Hypercholesterolemia     Right rotator cuff tear     Cluster headaches     Lumbar radiculopathy     Stenosis of lateral recess of  lumbosacral spine     Temporomandibular joint-pain-dysfunction syndrome (TMJ)     Hypothyroidism     Chronic pain     Snoring     Generalized abdominal pain     Bilateral carotid artery stenosis     Coronary artery disease involving native coronary artery of native heart with angina pectoris (H)     Other chronic pulmonary embolism without acute cor pulmonale (H)     Hematuria     Hydronephrosis     Bladder spasms     Anxiety disorder due to medical condition     Family relationship problem     History of posttraumatic stress disorder (PTSD)     Generalized muscle weakness     Myofascial pain     Moderate major depression (H)     Elevated lipase     Abdominal bloating     Acquired absence of other specified parts of digestive tract     Ampullary stenosis     Obstruction of biliary tree     Anemia     Aphthous ulcer of ileum     Disorder of phosphorus metabolism     Edema     Gastroesophageal reflux disease without esophagitis     Obstruction of common bile duct     Overflow diarrhea     History of partial colectomy     Reflex sympathetic dystrophy     Solitary left kidney     Flank pain     Hypocitraturia     Primary osteoarthritis of both knees     Iron deficiency anemia     Proteinuria     Concussion with loss of consciousness     Schizoaffective disorder (H)     Muscle weakness (generalized)     Shuffling gait     Falls frequently     Long term current use of anticoagulant therapy     COPD (chronic obstructive pulmonary disease) (H)     CKD (chronic kidney disease) stage 4, GFR 15-29 ml/min (H)     Degeneration of cervical intervertebral disc     Derangement of meniscus     Intractable chronic migraine without aura     Occipital neuralgia     Post-traumatic headache     S/p nephrectomy     Sciatic neuropathy     Pain in right knee     Leg pain, bilateral     Cervical radiculopathy     Spinal stenosis of cervical region     Fibrositis of neck     Hypokalemia     Diarrhea, unspecified type     Hypotension due to  hypovolemia       Family History       family history includes Alzheimer Disease in her sister; Aortic aneurysm in her mother; Cerebrovascular Disease in her father; Dementia in her maternal grandmother, mother, and sister; Heart Disease in her father and mother; Kidney Disease in her father and mother; Other - See Comments in her brother; Sleep Apnea in her sister.    Social History      reports that she quit smoking about 22 years ago. Her smoking use included cigarettes. She has a 20.00 pack-year smoking history. She has never used smokeless tobacco. She reports that she does not drink alcohol and does not use drugs.      Review of Systems     Patient denies current pain, limited mobility, fractures.   Remainder per HPI.      Vital Signs     /66 (BP Location: Right arm, Patient Position: Sitting, Cuff Size: Adult Regular)   Wt 67.6 kg (149 lb)   LMP  (LMP Unknown)   BMI 26.39 kg/m      Physical Exam     Constitutional:  Well developed, Well nourished  HENT:  Normocephalic,   Neck: normal in appearance  Eyes:  PERRL, Conjunctiva pink  Respiratory:  No respiratory distress  Skin: No acanthosis nigricans, lipoatrophy or lipodystrophy  Neurologic:  Alert & oriented x 3, nonfocal  Psychiatric:  Affect, Mood, Insight appropriate        Assessment     1. Osteoporosis, unspecified osteoporosis type, unspecified pathological fracture presence        Plan     With her diagnosis of Osteoporosis, we are hopeful that we will be able to get her back on the Prolia. We will apply for a PA. I will see her in 1 year after 2 injections, and we will plan on another Dexa Scan at that time.         Caroline Sumner NP   Endocrinology  12/8/2022  1:03 PM    Current Medications     Outpatient Medications Prior to Visit   Medication Sig Dispense Refill     albuterol (PROAIR HFA/PROVENTIL HFA/VENTOLIN HFA) 108 (90 Base) MCG/ACT inhaler Inhale 2 puffs into the lungs every 6 hours 18 g 4     allopurinol (ZYLOPRIM) 100 MG tablet  Take 1 tablet (100 mg) by mouth 2 times daily (Patient taking differently: Take 200 mg by mouth daily) 180 tablet 1     apixaban ANTICOAGULANT (ELIQUIS) 5 MG tablet Take 1 tablet (5 mg) by mouth 2 times daily 180 tablet 3     azelastine (ASTELIN) 0.1 % nasal spray 2 sprays each nostril 1-2x daily as needed for nasal congestion (use nightly for first 2 week) 30 mL 11     carvedilol (COREG) 3.125 MG tablet Take 1 tablet (3.125 mg) by mouth 2 times daily 180 tablet 3     Cholecalciferol (VITAMIN D-3) 125 MCG (5000 UT) TABS Take 5,000 Units by mouth daily       HEMP OIL OR EXTRACT OR OTHER CBD CANNABINOID, NOT MEDICAL CANNABIS, 50 mg At Bedtime Delta 8 Gummies       lamoTRIgine (LAMICTAL) 25 MG tablet Take 1 tablet (25 mg) by mouth 2 times daily 180 tablet 1     levothyroxine (SYNTHROID/LEVOTHROID) 50 MCG tablet Take 1 tablet (50 mcg) by mouth daily 90 tablet 3     methocarbamol (ROBAXIN) 500 MG tablet Take 500 mg by mouth 2 times daily       rosuvastatin (CRESTOR) 40 MG tablet Take 1 tablet (40 mg) by mouth daily 90 tablet 3     torsemide (DEMADEX) 10 MG tablet Take 1 tablet (10 mg) by mouth daily 90 tablet 3     traZODone (DESYREL) 100 MG tablet Take 4 tablets (400 mg) by mouth At Bedtime 360 tablet 1     venlafaxine (EFFEXOR XR) 150 MG 24 hr capsule TAKE ONE CAPSULE BY MOUTH EVERY DAY 90 capsule 0     venlafaxine (EFFEXOR XR) 37.5 MG 24 hr capsule Take 1 capsule (37.5 mg) by mouth daily In addition to 150 mg daily (total 187.5 mg/day) 90 capsule 1     zonisamide (ZONEGRAN) 25 MG capsule Take 2 capsules (50 mg) by mouth At Bedtime 60 capsule 3     No facility-administered medications prior to visit.         Lab Results     TSH   Date Value Ref Range Status   06/14/2022 0.60 0.30 - 5.00 uIU/mL Final     Phosphorus   Date Value Ref Range Status   09/06/2022 4.1 2.5 - 4.5 mg/dL Final     Iron   Date Value Ref Range Status   09/06/2022 65 37 - 145 ug/dL Final           Imaging Results   Last DEXA scan:  No valid  procedures specified.    Answers for HPI/ROS submitted by the patient on 12/5/2022  General Symptoms: No  Skin Symptoms: Yes  HENT Symptoms: No  EYE SYMPTOMS: No  HEART SYMPTOMS: Yes  LUNG SYMPTOMS: No  INTESTINAL SYMPTOMS: No  URINARY SYMPTOMS: No  GYNECOLOGIC SYMPTOMS: No  BREAST SYMPTOMS: No  SKELETAL SYMPTOMS: Yes  BLOOD SYMPTOMS: Yes  NERVOUS SYSTEM SYMPTOMS: Yes  MENTAL HEALTH SYMPTOMS: Yes  Changes in hair: No  Changes in moles/birth marks: No  Itching: Yes  Rashes: No  Changes in nails: Yes  Acne: No  Hair in places you don't want it: No  Change in facial hair: No  Warts: No  Non-healing sores: Yes  Scarring: No  Flaking of skin: Yes  Color changes of hands/feet in cold : No  Sun sensitivity: Yes  Skin thickening: No  Chest pain or pressure: No  Fast or irregular heartbeat: Yes  Pain in legs with walking: No  Trouble breathing while lying down: No  Fingers or toes appear blue: No  High blood pressure: Yes  Low blood pressure: Yes  Fainting: No  Murmurs: No  Pacemaker: No  Varicose veins: No  Wake up at night with shortness of breath: No  Light-headedness: Yes  Exercise intolerance: Yes  Back pain: Yes  Muscle aches: Yes  Neck pain: Yes  Swollen joints: Yes  Joint pain: Yes  Bone pain: Yes  Muscle cramps: No  Muscle weakness: Yes  Joint stiffness: Yes  Bone fracture: Yes  Edema or swelling: Yes  Anemia: No  Swollen glands: No  Easy bleeding or bruising: Yes  Trouble with coordination: No  Dizziness or trouble with balance: Yes  Fainting or black-out spells: No  Memory loss: No  Headache: Yes  Seizures: No  Speech problems: No  Tingling: No  Tremor: No  Weakness: Yes  Difficulty walking: No  Paralysis: No  Numbness: No  Nervous or Anxious: No  Depression: Yes  Trouble sleeping: Yes  Trouble thinking or concentrating: No  Mood changes: No  Panic attacks: No

## 2022-12-16 ASSESSMENT — ANXIETY QUESTIONNAIRES
GAD7 TOTAL SCORE: 2
3. WORRYING TOO MUCH ABOUT DIFFERENT THINGS: NOT AT ALL
IF YOU CHECKED OFF ANY PROBLEMS ON THIS QUESTIONNAIRE, HOW DIFFICULT HAVE THESE PROBLEMS MADE IT FOR YOU TO DO YOUR WORK, TAKE CARE OF THINGS AT HOME, OR GET ALONG WITH OTHER PEOPLE: SOMEWHAT DIFFICULT
7. FEELING AFRAID AS IF SOMETHING AWFUL MIGHT HAPPEN: NOT AT ALL
7. FEELING AFRAID AS IF SOMETHING AWFUL MIGHT HAPPEN: NOT AT ALL
5. BEING SO RESTLESS THAT IT IS HARD TO SIT STILL: NOT AT ALL
GAD7 TOTAL SCORE: 2
8. IF YOU CHECKED OFF ANY PROBLEMS, HOW DIFFICULT HAVE THESE MADE IT FOR YOU TO DO YOUR WORK, TAKE CARE OF THINGS AT HOME, OR GET ALONG WITH OTHER PEOPLE?: SOMEWHAT DIFFICULT
1. FEELING NERVOUS, ANXIOUS, OR ON EDGE: SEVERAL DAYS
GAD7 TOTAL SCORE: 2
6. BECOMING EASILY ANNOYED OR IRRITABLE: SEVERAL DAYS
4. TROUBLE RELAXING: NOT AT ALL
2. NOT BEING ABLE TO STOP OR CONTROL WORRYING: NOT AT ALL

## 2022-12-16 ASSESSMENT — PATIENT HEALTH QUESTIONNAIRE - PHQ9
10. IF YOU CHECKED OFF ANY PROBLEMS, HOW DIFFICULT HAVE THESE PROBLEMS MADE IT FOR YOU TO DO YOUR WORK, TAKE CARE OF THINGS AT HOME, OR GET ALONG WITH OTHER PEOPLE: SOMEWHAT DIFFICULT
SUM OF ALL RESPONSES TO PHQ QUESTIONS 1-9: 8
SUM OF ALL RESPONSES TO PHQ QUESTIONS 1-9: 8

## 2022-12-19 ENCOUNTER — VIRTUAL VISIT (OUTPATIENT)
Dept: PHYSICAL MEDICINE AND REHAB | Facility: CLINIC | Age: 82
End: 2022-12-19
Payer: MEDICARE

## 2022-12-19 ENCOUNTER — TELEPHONE (OUTPATIENT)
Dept: PHYSICAL MEDICINE AND REHAB | Facility: CLINIC | Age: 82
End: 2022-12-19

## 2022-12-19 DIAGNOSIS — U09.9 POST-COVID CHRONIC FATIGUE: ICD-10-CM

## 2022-12-19 DIAGNOSIS — G47.9 SLEEP DISTURBANCE: Primary | ICD-10-CM

## 2022-12-19 DIAGNOSIS — U09.9 POST-COVID CHRONIC CONCENTRATION DEFICIT: ICD-10-CM

## 2022-12-19 DIAGNOSIS — G93.32 POST-COVID CHRONIC FATIGUE: ICD-10-CM

## 2022-12-19 DIAGNOSIS — R41.840 POST-COVID CHRONIC CONCENTRATION DEFICIT: ICD-10-CM

## 2022-12-19 DIAGNOSIS — R49.0 VOICE HOARSENESS: ICD-10-CM

## 2022-12-19 PROCEDURE — 99215 OFFICE O/P EST HI 40 MIN: CPT | Mod: 95 | Performed by: PHYSICIAN ASSISTANT

## 2022-12-19 ASSESSMENT — ENCOUNTER SYMPTOMS
SLEEP DISTURBANCE: 1
DECREASED CONCENTRATION: 1
FATIGUE: 1
BACK PAIN: 1
DIZZINESS: 1
WEAKNESS: 1
SHORTNESS OF BREATH: 0
NAUSEA: 1
HEADACHES: 1
VOICE CHANGE: 1
MYALGIAS: 1
NECK PAIN: 1
SORE THROAT: 1
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
## 2022-12-19 NOTE — TELEPHONE ENCOUNTER
Provider: Christine Bennett PA-C Department: [facility] PHYS MED & REHAB     Visit Disposition    Dispositions   0 Return in about 2 months (around 2023) for Follow up, using a video visit, with me.     Encounter Information    Encounter Information    Provider Department Encounter # Center   2023 12:30 PM Christine Bennett PA-C [facility] PHYS MED & REHAB [id number] [facility]         Writer contacted patient to schedule follow up per check out notes above from provider. Patient's name and  verified during call to ensure right patient.  Patient scheduled appointment with writer. Patient in agreement with time/date/mode of appointment and did not verbalize any further questions at this time.  Yana FIGUEROA, Virtual Visit Facilitator 8:52 AM 2022
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
## 2022-12-19 NOTE — NURSING NOTE
Patient notes she was on lamotrigine 100 mg, but patient decreased lamotrigine to 25 mg BID because she felt lightheaded and had trouble finding right words when speaking. Patient had recent surgery over the last month. Patient notes she reviewed all other medications and allergies on her list and notes that they are all updated and accurate. Patient had issues with completing questionnaires on [facility] for visit. VF completed Promis-29 with patient on the phone. Provider notified VF she would complete ROS with patient during visit. VF apologized to patient that no one contacted her on Friday after a message was routed to the clinic regarding this. VF did not know this happened until today as message was not routed to VF team or provider.    Yana FIGUEROA, Virtual Visit Facilitator 7:39 AM December 19, 2022

## 2022-12-19 NOTE — PATIENT INSTRUCTIONS
Post COVID Self Care Suggestions:     Fatigue Management:       https://www.archives-pmr.org/action/showPdf?suq=-[email or web address]%[email or web address]       Self Care:      [email or web address]  Recovery World Health Organization:    https://apps.who.int/iris/bitstream/handle/90962/389345/ABW-MNMR-4541-032-56723-21606-eng.pdf  Breathing exercises:    [email or web address]

## 2022-12-22 ENCOUNTER — VIRTUAL VISIT (OUTPATIENT)
Dept: PULMONOLOGY | Facility: CLINIC | Age: 82
End: 2022-12-22
Payer: MEDICARE

## 2022-12-22 DIAGNOSIS — R06.09 POST-COVID CHRONIC DYSPNEA: Primary | ICD-10-CM

## 2022-12-22 DIAGNOSIS — U09.9 POST-COVID CHRONIC DYSPNEA: Primary | ICD-10-CM

## 2022-12-22 DIAGNOSIS — R06.02 SHORTNESS OF BREATH: ICD-10-CM

## 2022-12-22 PROCEDURE — 99204 OFFICE O/P NEW MOD 45 MIN: CPT | Mod: 95 | Performed by: INTERNAL MEDICINE
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
## 2022-12-28 ENCOUNTER — TELEPHONE (OUTPATIENT)
Dept: PULMONOLOGY | Facility: CLINIC | Age: 82
End: 2022-12-28

## 2022-12-28 DIAGNOSIS — R06.09 POST-COVID CHRONIC DYSPNEA: Primary | ICD-10-CM

## 2022-12-28 DIAGNOSIS — U09.9 POST-COVID CHRONIC DYSPNEA: Primary | ICD-10-CM

## 2022-12-28 RX ORDER — PREDNISONE 20 MG/1
20 TABLET ORAL DAILY
Qty: 7 TABLET | Refills: 0 | Status: SHIPPED | OUTPATIENT
Start: 2022-12-28 | End: 2023-01-04

## 2022-12-28 NOTE — TELEPHONE ENCOUNTER
Phone call from patient.  States that past couple of days she has had increased sob and coughs at night-time.  This is worse when she goes out in the cold weather.  States she got sick after being with family on Saturday.     Reviewed with Dr. Morrow in clinic and will prescribe prednisone 20mg daily x 7 days.  Follow up with Dr. Hercules after completed.    Patient has listed allergy to prednisone but states she has taken this in the past for her sob.  No problems with taking

## 2023-01-03 ENCOUNTER — VIRTUAL VISIT (OUTPATIENT)
Dept: PULMONOLOGY | Facility: CLINIC | Age: 83
End: 2023-01-03
Payer: MEDICARE

## 2023-01-03 DIAGNOSIS — G93.32 POST-COVID CHRONIC FATIGUE: ICD-10-CM

## 2023-01-03 DIAGNOSIS — U09.9 POST-COVID CHRONIC DYSPNEA: ICD-10-CM

## 2023-01-03 DIAGNOSIS — U09.9 POST-COVID CHRONIC FATIGUE: ICD-10-CM

## 2023-01-03 DIAGNOSIS — R06.02 SHORTNESS OF BREATH: ICD-10-CM

## 2023-01-03 DIAGNOSIS — R05.2 SUBACUTE COUGH: Primary | ICD-10-CM

## 2023-01-03 DIAGNOSIS — R06.09 POST-COVID CHRONIC DYSPNEA: ICD-10-CM

## 2023-01-03 PROCEDURE — 99214 OFFICE O/P EST MOD 30 MIN: CPT | Mod: 95 | Performed by: NURSE PRACTITIONER

## 2023-01-03 RX ORDER — PREDNISONE 10 MG/1
TABLET ORAL
Qty: 18 TABLET | Refills: 0 | Status: SHIPPED | OUTPATIENT
Start: 2023-01-03 | End: 2023-01-12
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
## 2023-01-03 NOTE — PATIENT INSTRUCTIONS
- you call and let us know how you are feeling after you finish the prednisone.   - we will have you follow up in clinic in a few weeks, we can go over the chest xray at that point.     If you have worsening symptoms, questions, or need to speak with the nurse please call [phone].    Tory Saleem, CNP  Pulmonary Medicine  [facility]   [phone]

## 2023-01-10 ENCOUNTER — ANCILLARY PROCEDURE (OUTPATIENT)
Dept: GENERAL RADIOLOGY | Facility: CLINIC | Age: 83
End: 2023-01-10
Attending: PHYSICIAN ASSISTANT
Payer: MEDICARE

## 2023-01-10 ENCOUNTER — NURSE TRIAGE (OUTPATIENT)
Dept: NURSING | Facility: CLINIC | Age: 83
End: 2023-01-10

## 2023-01-10 ENCOUNTER — OFFICE VISIT (OUTPATIENT)
Dept: FAMILY MEDICINE | Facility: CLINIC | Age: 83
End: 2023-01-10
Payer: MEDICARE

## 2023-01-10 VITALS
TEMPERATURE: 99.5 F | HEART RATE: 94 BPM | OXYGEN SATURATION: 94 % | RESPIRATION RATE: 18 BRPM | SYSTOLIC BLOOD PRESSURE: 136 MMHG | DIASTOLIC BLOOD PRESSURE: 76 MMHG

## 2023-01-10 DIAGNOSIS — B02.9 HERPES ZOSTER WITHOUT COMPLICATION: Primary | ICD-10-CM

## 2023-01-10 DIAGNOSIS — R05.9 COUGH: ICD-10-CM

## 2023-01-10 DIAGNOSIS — J18.9 COMMUNITY ACQUIRED PNEUMONIA, UNSPECIFIED LATERALITY: ICD-10-CM

## 2023-01-10 PROCEDURE — 71046 X-RAY EXAM CHEST 2 VIEWS: CPT | Mod: TC | Performed by: RADIOLOGY

## 2023-01-10 PROCEDURE — 87529 HSV DNA AMP PROBE: CPT | Performed by: PHYSICIAN ASSISTANT

## 2023-01-10 PROCEDURE — 99213 OFFICE O/P EST LOW 20 MIN: CPT | Performed by: PHYSICIAN ASSISTANT

## 2023-01-10 RX ORDER — VALACYCLOVIR HYDROCHLORIDE 1 G/1
1000 TABLET, FILM COATED ORAL 3 TIMES DAILY
Qty: 21 TABLET | Refills: 0 | Status: SHIPPED | OUTPATIENT
Start: 2023-01-10 | End: 2023-01-24

## 2023-01-10 NOTE — PROGRESS NOTES
[facility]    Outpatient Occupational Therapy Discharge Note  Patient: Sandy Spencer  : 1940    Beginning/End Dates of Reporting Period:  22 to 22    Referring Provider: Dr. Christine Bennett    Therapy Diagnosis: decreased ADL and IADL's, fatigue, memory changes    Goals:   Goal Identifier     Goal Description Patient will demonstrate knowledge of fatigue management strategies to increaase ease of ADL and IADL's   Target Date 22   Date Met  22   Progress (detail required for progress note):       Goal Identifier     Goal Description Patient will demonstrate knowledge of compensatory strategies for memory changes   Target Date 22   Date Met  22   Progress (detail required for progress note):       Plan:  Discharge from therapy.

## 2023-01-10 NOTE — TELEPHONE ENCOUNTER
"Nurse Triage SBAR    Is this a 2nd Level Triage? YES, LICENSED PRACTITIONER REVIEW IS REQUIRED    Situation: Patient reporting symptoms of a possible shingles rash on her left lower buttock that erupted two days ago.        Background: Patient says she has had a shingles outbreak 15 times during her life.    \"Dr. Rees wants to do a biopsy\" to confirm that it was not something else due to past failed treatment.      Patient asking if she can have this done at would like to have this done at [facility] due to scheduling difficulties and that she is high risk.      Assessment: Rash is one left sciatica area and has pustules that are erupting.    Protocol Recommended Disposition:   No disposition on file.  See provider within four hours(PCP triage)    Recommendation: Symptom triage is to go to UC, but not sure UC would be able to do biopsy.      Patient would like to discuss this with PCP and can be reached at  [phone].  May leave a message with an appropriate number to call back as patient says she sometimes screens her calls.    Routed to provider    Does the patient meet one of the following criteria for ADS visit consideration? 16+ years old, with an [facility] PCP     TIP  Providers, please consider if this condition is appropriate for management at one of our Acute and Diagnostic Services sites.     If patient is a good candidate, please use dotphrase <dot>triageresponse and select Refer to ADS to document.    Francy Kearney RN  [facility]                  Reason for Disposition    [1] Shingles rash (matches SYMPTOMS) AND [2] weak immune system (e.g., HIV positive,  cancer chemotherapy, chronic steroid treatment, splenectomy) AND [3] NOT taking antiviral medication    Additional Information    Negative: Difficult to awaken or acting confused (e.g., disoriented, slurred speech)    Negative: Sounds like a life-threatening emergency to the triager    Negative: Patient sounds very sick or weak to " the triager    Protocols used: SHINGLES-A-AH

## 2023-01-10 NOTE — TELEPHONE ENCOUNTER
She doesn't need to have a biopsy, rather a herpes/shingles swab. They should be able to do this at the walk in clinic no problem.

## 2023-01-10 NOTE — ADDENDUM NOTE
Encounter addended by: Amarilys Jolly, OT on: 1/10/2023 12:41 PM   Actions taken: Episode resolved, Clinical Note Signed, Flowsheet accepted

## 2023-01-11 ENCOUNTER — TELEPHONE (OUTPATIENT)
Dept: ENDOCRINOLOGY | Facility: CLINIC | Age: 83
End: 2023-01-11

## 2023-01-11 ENCOUNTER — TELEPHONE (OUTPATIENT)
Dept: FAMILY MEDICINE | Facility: CLINIC | Age: 83
End: 2023-01-11

## 2023-01-11 DIAGNOSIS — M81.0 OSTEOPOROSIS, UNSPECIFIED OSTEOPOROSIS TYPE, UNSPECIFIED PATHOLOGICAL FRACTURE PRESENCE: Primary | ICD-10-CM

## 2023-01-11 DIAGNOSIS — Z92.29 PERSONAL HISTORY OF OTHER DRUG THERAPY: ICD-10-CM

## 2023-01-11 LAB
HSV1 DNA SPEC QL NAA+PROBE: NOT DETECTED
HSV2 DNA SPEC QL NAA+PROBE: DETECTED

## 2023-01-11 NOTE — PATIENT INSTRUCTIONS
Discussed differentials and treatment options, lesions are benign and patient is reassured.  Take medication as prescribed, and recheck with PCP if not resolving as expected, sooner with Walk-In Clinic or Emergency Room if worsening.      Herpes Zoster:    This is a viral illness caused by the chicken post virus that lies dormant in the nerve root. It can be stimulated by stress. If effects one nerve pattern, so will not spread to another area or cross the midline. The secretions are contagious, but once the lesions dry, they are not contagious. It is treated with antiviral medication that is most effective when given in the first 72 hours to help dry the lesions and reduce the risk of long-term pain from the condition.     Prescribed: Valtrex. Discussed pain medication options and prescribed: OTC Tylenol. If requires stonger or more pain medication call primary to discuss.     Patient handout on Herpes zoster provided.     Follow-up with primary for persistence or worsening of symptoms.

## 2023-01-11 NOTE — TELEPHONE ENCOUNTER
Returned phone call and discussed patient's questions.  Will decrease Valtrex dose to 500 mg once daily for 3 to 5 days.  She will continue with cold compresses and calamine lotion as needed for symptoms.  Answered all questions.  Patient was appreciative.

## 2023-01-11 NOTE — TELEPHONE ENCOUNTER
Informed pt of info and she verbalized understanding.    Patient would like to double check with provider See that is is okay for her to proceed with taking Valtrex as she was told that since she only have one good kidney left, and is on stage 4 renal failure that she needs to double check about taking Valtrex.    Patient stated that the pharmacist read of some of the potentials issues she could run into with being stage 4 renal failure and taking valtrex, she was advised to reach back to provider first.    Informed pt that writer will route message to provider See to advise.    Per pt, OK to leave detailed messages at her mobile number if she does not . And then if she she has additional questions she could call back; please also leave a call back number for pt.    Bailee Merino, DUNCANN, RN  [facility]
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
## 2023-01-11 NOTE — TELEPHONE ENCOUNTER
[facility] Call Center    Phone Message    May a detailed message be left on voicemail: yes     Reason for Call: Other: Patient is requesting a call back.  She states that she was told someone would reach out to her mid December with information regarding the shots she is to get. Please follow up. Thank you.    Action Taken: Other: endo    Travel Screening: Not Applicable

## 2023-01-11 NOTE — TELEPHONE ENCOUNTER
Attempted to call patient to discuss results, and left message for patient to call back.    If patient calls back, please relay this message:    Per patient's note, it looks like they initially intended to swab her for herpes zoster.  However, the swab was instead for herpes simplex virus.  Patient did test positive for herpes simplex 2.  This is likely causing patient's symptoms.  Patient may decrease dose of Valtrex from 1000 mg 3 times a day down to 500 mg twice daily for only 3 days.    Patient should be informed that herpes simplex virus is sexually transmitted and will continue to cause reoccurring flareups.  Blisters/sores are contagious while active.  If patient gets future flareups, she may also be treated with oral antivirals again if symptoms treated within the first 3 days.

## 2023-01-23 ENCOUNTER — VIRTUAL VISIT (OUTPATIENT)
Dept: PULMONOLOGY | Facility: CLINIC | Age: 83
End: 2023-01-23
Payer: MEDICARE

## 2023-01-23 DIAGNOSIS — J41.0 SIMPLE CHRONIC BRONCHITIS (H): ICD-10-CM

## 2023-01-23 DIAGNOSIS — R05.3 CHRONIC COUGH: ICD-10-CM

## 2023-01-23 DIAGNOSIS — R06.09 POST-COVID CHRONIC DYSPNEA: Primary | ICD-10-CM

## 2023-01-23 DIAGNOSIS — R06.02 SHORTNESS OF BREATH: ICD-10-CM

## 2023-01-23 DIAGNOSIS — U09.9 POST-COVID CHRONIC DYSPNEA: Primary | ICD-10-CM

## 2023-01-23 PROCEDURE — 99214 OFFICE O/P EST MOD 30 MIN: CPT | Mod: 95 | Performed by: NURSE PRACTITIONER

## 2023-01-23 RX ORDER — IPRATROPIUM BROMIDE AND ALBUTEROL SULFATE 2.5; .5 MG/3ML; MG/3ML
1 SOLUTION RESPIRATORY (INHALATION) EVERY 6 HOURS PRN
Qty: 180 ML | Refills: 3 | Status: SHIPPED | OUTPATIENT
Start: 2023-01-23 | End: 2023-02-20

## 2023-01-23 NOTE — PATIENT INSTRUCTIONS
- let's have you try as needed nebulizer treatments instead of a daily inhaler.   - you can  your nebulizer machine at:  [address] [address]   [email or web address]    If you have worsening symptoms, questions, or need to speak with the nurse please call [phone].
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
## 2023-01-24 ASSESSMENT — ENCOUNTER SYMPTOMS
RECTAL PAIN: 0
POOR WOUND HEALING: 0
ARTHRALGIAS: 1
POSTURAL DYSPNEA: 0
DYSURIA: 0
BREAST MASS: 0
CHILLS: 1
LOSS OF CONSCIOUSNESS: 0
JOINT SWELLING: 1
DECREASED APPETITE: 0
LIGHT-HEADEDNESS: 1
BACK PAIN: 1
HEARTBURN: 0
HEMOPTYSIS: 0
NECK MASS: 0
INCREASED ENERGY: 1
VOMITING: 0
COUGH: 1
MUSCLE WEAKNESS: 1
NUMBNESS: 0
EYE WATERING: 0
WEIGHT LOSS: 0
DISTURBANCES IN COORDINATION: 0
SWOLLEN GLANDS: 0
DIZZINESS: 1
SINUS PAIN: 1
HALLUCINATIONS: 0
ALTERED TEMPERATURE REGULATION: 1
POLYPHAGIA: 0
SKIN CHANGES: 0
MUSCLE CRAMPS: 0
SHORTNESS OF BREATH: 1
NAUSEA: 1
HYPOTENSION: 1
EYE REDNESS: 0
BLOATING: 0
FATIGUE: 1
DYSPNEA ON EXERTION: 1
ORTHOPNEA: 0
COUGH DISTURBING SLEEP: 0
DIFFICULTY URINATING: 0
BLOOD IN STOOL: 0
DOUBLE VISION: 0
WEIGHT GAIN: 1
SPUTUM PRODUCTION: 0
BRUISES/BLEEDS EASILY: 1
BOWEL INCONTINENCE: 1
SORE THROAT: 1
MYALGIAS: 1
PARALYSIS: 0
ABDOMINAL PAIN: 1
SNORES LOUDLY: 0
FEVER: 0
SLEEP DISTURBANCES DUE TO BREATHING: 0
SEIZURES: 0
DIARRHEA: 1
TREMORS: 0
HEADACHES: 1
HYPERTENSION: 1
HEMATURIA: 0
EYE IRRITATION: 1
NAIL CHANGES: 1
TINGLING: 0
WHEEZING: 0
FLANK PAIN: 1
LEG PAIN: 0
TASTE DISTURBANCE: 0
EXERCISE INTOLERANCE: 1
SINUS CONGESTION: 0
SPEECH CHANGE: 0
EYE PAIN: 0
HOARSE VOICE: 1
MEMORY LOSS: 0
SYNCOPE: 0
PALPITATIONS: 1
WEAKNESS: 1
POLYDIPSIA: 0
JAUNDICE: 0
NECK PAIN: 1
BREAST PAIN: 1
SMELL DISTURBANCE: 0
NIGHT SWEATS: 0
STIFFNESS: 1

## 2023-01-25 ENCOUNTER — ALLIED HEALTH/NURSE VISIT (OUTPATIENT)
Dept: ENDOCRINOLOGY | Facility: CLINIC | Age: 83
End: 2023-01-25
Payer: MEDICARE

## 2023-01-25 DIAGNOSIS — Z92.29 PERSONAL HISTORY OF OTHER DRUG THERAPY: ICD-10-CM

## 2023-01-25 DIAGNOSIS — M81.0 OSTEOPOROSIS, UNSPECIFIED OSTEOPOROSIS TYPE, UNSPECIFIED PATHOLOGICAL FRACTURE PRESENCE: Primary | ICD-10-CM

## 2023-01-25 PROCEDURE — 96372 THER/PROPH/DIAG INJ SC/IM: CPT | Performed by: NURSE PRACTITIONER

## 2023-01-25 PROCEDURE — 99207 PR NO CHARGE NURSE ONLY: CPT

## 2023-01-25 NOTE — PROGRESS NOTES
"Prolia Injection Phone Screen      Screening questions have been asked 2-3 days prior to administration visit for Prolia. If any questions are answered with \"Yes,\" this phone encounter were will routed to ordering provider for further evaluation.     1.  When was the last injection?  1st    2.  Has insurance for this injection been verified?  Yes    3.  Did you experience any new onset achiness or rashes that lasted for over a month with your previous Prolia injection?   No    4.  Do you have a fever over 101?F or a new deep cough that is unusual for you today? No    5.  Have you started any new medications in the last 6 months that you were told could affect your immune system? These may have been prescribed by oncologist, transplant, rheumatology, or dermatology.   No    6.  In the last 6 months have you have gastric bypass or parathyroid surgery?   No    7.  Do you plan dental work requiring drilling into the bone such as implants/extractions or oral surgery in the next 2-3 months?   No    8. Do you have new insurance since the last injection?    Patient informed if symptoms discussed above present prior to their administration appointment, they are to notify clinic immediately.     Diya SPICER RN          The following steps were completed to comply with the REMS program for Prolia:  1. Ordering provider has previously reviewed information in the Medication Guide and Patient Counseling Chart, including the serious risks of Prolia  and the symptoms of each risk and have been advised to seek prompt medical attention if they have signs or symptoms of any of the serious risks.  2. Provided each patient a copy of the Medication Guide and Patient Brochure.  See MAR for administration details.   Indication: Prolia  (denosumab) is a prescription medicine used to treat osteoporosis in patients who:   Are at high risk for fracture, meaning patients who have had a fracture related to osteoporosis, or who have " multiple risk factors for fracture; Cannot use another osteoporosis medicine or other osteoporosis medicines did not work well.   The timeline for early/late injections would be 4 weeks early and any time after the 6 month boubacar. If a patient receives their injection late, then the subsequent injection would be 6 months from the date that they actually received the injection    Have the screening questions been asked prior to this administration? Yes    The following medication was given:     MEDICATION: Denosumab (Prolia) 60 mg/ml SOLN  ROUTE: SQ  SITE: Arm - Left  DOSE: 60mg  LOT #: [id number]  :  [employer]  EXPIRATION DATE:  31 March 2025  NDC#: see MAR

## 2023-02-03 ENCOUNTER — TRANSFERRED RECORDS (OUTPATIENT)
Dept: HEALTH INFORMATION MANAGEMENT | Facility: CLINIC | Age: 83
End: 2023-02-03

## 2023-02-09 ENCOUNTER — LAB (OUTPATIENT)
Dept: LAB | Facility: CLINIC | Age: 83
End: 2023-02-09
Payer: MEDICARE

## 2023-02-09 DIAGNOSIS — N18.30 CHRONIC KIDNEY DISEASE, STAGE III (MODERATE) (H): Primary | ICD-10-CM

## 2023-02-09 LAB
ALBUMIN SERPL-MCNC: 3.7 G/DL (ref 3.5–5)
ANION GAP SERPL CALCULATED.3IONS-SCNC: 10 MMOL/L (ref 5–18)
BASOPHILS # BLD AUTO: 0 10E3/UL (ref 0–0.2)
BASOPHILS NFR BLD AUTO: 1 %
BUN SERPL-MCNC: 23 MG/DL (ref 8–28)
CALCIUM SERPL-MCNC: 8.8 MG/DL (ref 8.5–10.5)
CHLORIDE BLD-SCNC: 102 MMOL/L (ref 98–107)
CO2 SERPL-SCNC: 23 MMOL/L (ref 22–31)
CREAT SERPL-MCNC: 1.62 MG/DL (ref 0.6–1.1)
EOSINOPHIL # BLD AUTO: 0 10E3/UL (ref 0–0.7)
EOSINOPHIL NFR BLD AUTO: 1 %
ERYTHROCYTE [DISTWIDTH] IN BLOOD BY AUTOMATED COUNT: 15.2 % (ref 10–15)
FERRITIN SERPL-MCNC: 301 NG/ML (ref 11–328)
GFR SERPL CREATININE-BSD FRML MDRD: 32 ML/MIN/1.73M2
GLUCOSE BLD-MCNC: 89 MG/DL (ref 70–125)
HCT VFR BLD AUTO: 33.7 % (ref 35–47)
HGB BLD-MCNC: 11 G/DL (ref 11.7–15.7)
IMM GRANULOCYTES # BLD: 0 10E3/UL
IMM GRANULOCYTES NFR BLD: 0 %
IRON BINDING CAPACITY (ROCHE): 259 UG/DL (ref 240–430)
IRON SATN MFR SERPL: 25 % (ref 15–46)
IRON SERPL-MCNC: 65 UG/DL (ref 37–145)
LYMPHOCYTES # BLD AUTO: 1.8 10E3/UL (ref 0.8–5.3)
LYMPHOCYTES NFR BLD AUTO: 36 %
MCH RBC QN AUTO: 31.6 PG (ref 26.5–33)
MCHC RBC AUTO-ENTMCNC: 32.6 G/DL (ref 31.5–36.5)
MCV RBC AUTO: 97 FL (ref 78–100)
MONOCYTES # BLD AUTO: 0.6 10E3/UL (ref 0–1.3)
MONOCYTES NFR BLD AUTO: 12 %
NEUTROPHILS # BLD AUTO: 2.5 10E3/UL (ref 1.6–8.3)
NEUTROPHILS NFR BLD AUTO: 50 %
NRBC # BLD AUTO: 0 10E3/UL
NRBC BLD AUTO-RTO: 0 /100
PHOSPHATE SERPL-MCNC: 3.2 MG/DL (ref 2.5–4.5)
PLATELET # BLD AUTO: 175 10E3/UL (ref 150–450)
POTASSIUM BLD-SCNC: 3.9 MMOL/L (ref 3.5–5)
PTH-INTACT SERPL-MCNC: 59 PG/ML (ref 15–65)
RBC # BLD AUTO: 3.48 10E6/UL (ref 3.8–5.2)
SODIUM SERPL-SCNC: 135 MMOL/L (ref 136–145)
VIT B12 SERPL-MCNC: 639 PG/ML (ref 232–1245)
WBC # BLD AUTO: 5.1 10E3/UL (ref 4–11)

## 2023-02-09 PROCEDURE — 36415 COLL VENOUS BLD VENIPUNCTURE: CPT

## 2023-02-09 PROCEDURE — 82607 VITAMIN B-12: CPT

## 2023-02-09 PROCEDURE — 83550 IRON BINDING TEST: CPT

## 2023-02-09 PROCEDURE — 82728 ASSAY OF FERRITIN: CPT

## 2023-02-09 PROCEDURE — 83970 ASSAY OF PARATHORMONE: CPT

## 2023-02-09 PROCEDURE — 85025 COMPLETE CBC W/AUTO DIFF WBC: CPT

## 2023-02-09 PROCEDURE — 82746 ASSAY OF FOLIC ACID SERUM: CPT

## 2023-02-09 PROCEDURE — 82040 ASSAY OF SERUM ALBUMIN: CPT

## 2023-02-10 LAB — FOLATE SERPL-MCNC: >40 NG/ML (ref 4.6–34.8)

## 2023-02-13 ENCOUNTER — TRANSFERRED RECORDS (OUTPATIENT)
Dept: HEALTH INFORMATION MANAGEMENT | Facility: CLINIC | Age: 83
End: 2023-02-13
Payer: MEDICARE

## 2023-02-19 ASSESSMENT — PAIN SCALES - PAIN ENJOYMENT GENERAL ACTIVITY SCALE (PEG)
PEG_TOTALSCORE: 7
AVG_PAIN_PASTWEEK: 8
AVG_PAIN_PASTWEEK: 8
INTERFERED_GENERAL_ACTIVITY: 8
INTERFERED_GENERAL_ACTIVITY: 8
INTERFERED_ENJOYMENT_LIFE: 5
PEG_TOTALSCORE: 7
INTERFERED_ENJOYMENT_LIFE: 5

## 2023-02-19 ASSESSMENT — ANXIETY QUESTIONNAIRES
8. IF YOU CHECKED OFF ANY PROBLEMS, HOW DIFFICULT HAVE THESE MADE IT FOR YOU TO DO YOUR WORK, TAKE CARE OF THINGS AT HOME, OR GET ALONG WITH OTHER PEOPLE?: VERY DIFFICULT
IF YOU CHECKED OFF ANY PROBLEMS ON THIS QUESTIONNAIRE, HOW DIFFICULT HAVE THESE PROBLEMS MADE IT FOR YOU TO DO YOUR WORK, TAKE CARE OF THINGS AT HOME, OR GET ALONG WITH OTHER PEOPLE: VERY DIFFICULT
3. WORRYING TOO MUCH ABOUT DIFFERENT THINGS: SEVERAL DAYS
7. FEELING AFRAID AS IF SOMETHING AWFUL MIGHT HAPPEN: NOT AT ALL
7. FEELING AFRAID AS IF SOMETHING AWFUL MIGHT HAPPEN: NOT AT ALL
GAD7 TOTAL SCORE: 6
6. BECOMING EASILY ANNOYED OR IRRITABLE: SEVERAL DAYS
GAD7 TOTAL SCORE: 6
GAD7 TOTAL SCORE: 6
3. WORRYING TOO MUCH ABOUT DIFFERENT THINGS: SEVERAL DAYS
5. BEING SO RESTLESS THAT IT IS HARD TO SIT STILL: SEVERAL DAYS
7. FEELING AFRAID AS IF SOMETHING AWFUL MIGHT HAPPEN: NOT AT ALL
2. NOT BEING ABLE TO STOP OR CONTROL WORRYING: SEVERAL DAYS
4. TROUBLE RELAXING: SEVERAL DAYS
2. NOT BEING ABLE TO STOP OR CONTROL WORRYING: SEVERAL DAYS
1. FEELING NERVOUS, ANXIOUS, OR ON EDGE: SEVERAL DAYS
8. IF YOU CHECKED OFF ANY PROBLEMS, HOW DIFFICULT HAVE THESE MADE IT FOR YOU TO DO YOUR WORK, TAKE CARE OF THINGS AT HOME, OR GET ALONG WITH OTHER PEOPLE?: VERY DIFFICULT
6. BECOMING EASILY ANNOYED OR IRRITABLE: SEVERAL DAYS
7. FEELING AFRAID AS IF SOMETHING AWFUL MIGHT HAPPEN: NOT AT ALL
1. FEELING NERVOUS, ANXIOUS, OR ON EDGE: SEVERAL DAYS
IF YOU CHECKED OFF ANY PROBLEMS ON THIS QUESTIONNAIRE, HOW DIFFICULT HAVE THESE PROBLEMS MADE IT FOR YOU TO DO YOUR WORK, TAKE CARE OF THINGS AT HOME, OR GET ALONG WITH OTHER PEOPLE: VERY DIFFICULT
4. TROUBLE RELAXING: SEVERAL DAYS
GAD7 TOTAL SCORE: 6
5. BEING SO RESTLESS THAT IT IS HARD TO SIT STILL: SEVERAL DAYS

## 2023-02-20 ENCOUNTER — VIRTUAL VISIT (OUTPATIENT)
Dept: PALLIATIVE MEDICINE | Facility: OTHER | Age: 83
End: 2023-02-20
Attending: ANESTHESIOLOGY
Payer: MEDICARE

## 2023-02-20 VITALS — HEART RATE: 100 BPM | SYSTOLIC BLOOD PRESSURE: 104 MMHG | DIASTOLIC BLOOD PRESSURE: 75 MMHG

## 2023-02-20 DIAGNOSIS — M54.81 BILATERAL OCCIPITAL NEURALGIA: Primary | ICD-10-CM

## 2023-02-20 PROCEDURE — G0463 HOSPITAL OUTPT CLINIC VISIT: HCPCS | Mod: PN,GT | Performed by: ANESTHESIOLOGY

## 2023-02-20 PROCEDURE — 99213 OFFICE O/P EST LOW 20 MIN: CPT | Mod: VID | Performed by: ANESTHESIOLOGY

## 2023-02-20 RX ORDER — LEVETIRACETAM 250 MG/1
TABLET ORAL
Qty: 60 TABLET | Refills: 1 | Status: SHIPPED | OUTPATIENT
Start: 2023-02-20 | End: 2023-03-20

## 2023-02-20 RX ORDER — IPRATROPIUM BROMIDE AND ALBUTEROL SULFATE 2.5; .5 MG/3ML; MG/3ML
1 SOLUTION RESPIRATORY (INHALATION) EVERY 6 HOURS PRN
COMMUNITY
End: 2024-09-03

## 2023-02-20 SDOH — SOCIAL STABILITY: SOCIAL NETWORK: I HAVE TROUBLE DOING ALL OF THE ACTIVITIES WITH FRIENDS THAT I WANT TO DO: SOMETIMES

## 2023-02-20 SDOH — SOCIAL STABILITY: SOCIAL NETWORK

## 2023-02-20 SDOH — SOCIAL STABILITY: SOCIAL NETWORK: I HAVE TROUBLE DOING ALL OF MY USUAL WORK (INCLUDE WORK AT HOME): ALWAYS

## 2023-02-20 SDOH — SOCIAL STABILITY: SOCIAL NETWORK: I HAVE TROUBLE DOING ALL OF MY REGULAR LEISURE ACTIVITIES WITH OTHERS: SOMETIMES

## 2023-02-20 SDOH — SOCIAL STABILITY: SOCIAL NETWORK: PROMIS ABILITY TO PARTICIPATE IN SOCIAL ROLES & ACTIVITIES T-SCORE: 41.7

## 2023-02-20 SDOH — SOCIAL STABILITY: SOCIAL NETWORK: I HAVE TROUBLE DOING ALL OF THE FAMILY ACTIVITIES THAT I WANT TO DO: SOMETIMES

## 2023-02-20 ASSESSMENT — ENCOUNTER SYMPTOMS
HEMOPTYSIS: 0
RECTAL PAIN: 0
CHILLS: 1
FATIGUE: 1
FEVER: 0
POLYPHAGIA: 1
NECK PAIN: 1
ALTERED TEMPERATURE REGULATION: 1
TASTE DISTURBANCE: 1
HEADACHES: 1
COUGH DISTURBING SLEEP: 1
BLOOD IN STOOL: 0
MEMORY LOSS: 0
TREMORS: 0
ARTHRALGIAS: 1
WEIGHT GAIN: 1
SINUS CONGESTION: 1
PARALYSIS: 0
POLYDIPSIA: 1
SORE THROAT: 1
ORTHOPNEA: 1
COUGH: 1
NIGHT SWEATS: 0
DECREASED APPETITE: 0
HOARSE VOICE: 1
BACK PAIN: 1
SWOLLEN GLANDS: 0
JOINT SWELLING: 1
SYNCOPE: 0
HYPERTENSION: 0
STIFFNESS: 1
DIARRHEA: 1
LOSS OF CONSCIOUSNESS: 0
SLEEP DISTURBANCES DUE TO BREATHING: 1
BRUISES/BLEEDS EASILY: 1
BREAST MASS: 0
MUSCLE WEAKNESS: 1
TINGLING: 1
DISTURBANCES IN COORDINATION: 0
PANIC: 0
SPUTUM PRODUCTION: 0
SPEECH CHANGE: 0
NUMBNESS: 1
NAIL CHANGES: 1
SMELL DISTURBANCE: 0
MYALGIAS: 1
NAUSEA: 1
LEG PAIN: 1
ABDOMINAL PAIN: 0
WEIGHT LOSS: 0
SNORES LOUDLY: 1
DIZZINESS: 1
SEIZURES: 0
DECREASED CONCENTRATION: 0
HEARTBURN: 0
PALPITATIONS: 0
DYSPNEA ON EXERTION: 1
EYE PAIN: 0
WHEEZING: 0
WEAKNESS: 1
BOWEL INCONTINENCE: 1
DEPRESSION: 1
NERVOUS/ANXIOUS: 1
EYE WATERING: 0
DYSURIA: 0
JAUNDICE: 0
SINUS PAIN: 0
CONSTIPATION: 0
POSTURAL DYSPNEA: 1
EYE REDNESS: 0
SHORTNESS OF BREATH: 1
EYE IRRITATION: 1
INSOMNIA: 1
SKIN CHANGES: 0
BREAST PAIN: 0
HALLUCINATIONS: 0
MUSCLE CRAMPS: 0
LIGHT-HEADEDNESS: 1
FLANK PAIN: 1
VOMITING: 0
DIFFICULTY URINATING: 0
EXERCISE INTOLERANCE: 1
HYPOTENSION: 1
POOR WOUND HEALING: 0
BLOATING: 1
TROUBLE SWALLOWING: 1
NECK MASS: 0
HEMATURIA: 0
INCREASED ENERGY: 0
DOUBLE VISION: 0

## 2023-02-20 ASSESSMENT — PAIN SCALES - GENERAL: PAINLEVEL: EXTREME PAIN (8)

## 2023-02-20 ASSESSMENT — SLEEP AND FATIGUE QUESTIONNAIRES
HOW LIKELY ARE YOU TO NOD OFF OR FALL ASLEEP WHILE SITTING QUIETLY AFTER LUNCH WITHOUT ALCOHOL: WOULD NEVER DOZE
HOW LIKELY ARE YOU TO NOD OFF OR FALL ASLEEP WHILE SITTING INACTIVE IN A PUBLIC PLACE: WOULD NEVER DOZE
HOW LIKELY ARE YOU TO NOD OFF OR FALL ASLEEP WHILE SITTING AND READING: WOULD NEVER DOZE
HOW LIKELY ARE YOU TO NOD OFF OR FALL ASLEEP WHILE WATCHING TV: SLIGHT CHANCE OF DOZING
HOW LIKELY ARE YOU TO NOD OFF OR FALL ASLEEP WHILE LYING DOWN TO REST IN THE AFTERNOON WHEN CIRCUMSTANCES PERMIT: SLIGHT CHANCE OF DOZING
HOW LIKELY ARE YOU TO NOD OFF OR FALL ASLEEP WHILE SITTING AND TALKING TO SOMEONE: WOULD NEVER DOZE
HOW LIKELY ARE YOU TO NOD OFF OR FALL ASLEEP IN A CAR, WHILE STOPPED FOR A FEW MINUTES IN TRAFFIC: WOULD NEVER DOZE
HOW LIKELY ARE YOU TO NOD OFF OR FALL ASLEEP WHEN YOU ARE A PASSENGER IN A CAR FOR AN HOUR WITHOUT A BREAK: SLIGHT CHANCE OF DOZING

## 2023-02-20 ASSESSMENT — PATIENT HEALTH QUESTIONNAIRE - PHQ9
SUM OF ALL RESPONSES TO PHQ QUESTIONS 1-9: 11
10. IF YOU CHECKED OFF ANY PROBLEMS, HOW DIFFICULT HAVE THESE PROBLEMS MADE IT FOR YOU TO DO YOUR WORK, TAKE CARE OF THINGS AT HOME, OR GET ALONG WITH OTHER PEOPLE: EXTREMELY DIFFICULT
SUM OF ALL RESPONSES TO PHQ QUESTIONS 1-9: 11
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
## 2023-02-20 NOTE — NURSING NOTE
Patient presents to the clinic today for a follow up with ARELIS FITZPATRICK MD regarding Pain Management.    Sandy is a 82 year old who is being evaluated via a billable video visit.      How would you like to obtain your AVS? Mail a copy  If the video visit is dropped, the invitation should be resent by: Text to cell phone: [phone]  Will anyone else be joining your video visit? No        Video-Visit Details    Type of service:  Video Visit   Video Start Time:   Video End Time:    Originating Location (pt. Location): Home    Distant Location (provider location):  On-site  Platform used for Video Visit: Doximity        Is Pt currently in [place]? Yes  NOTE: If Pt is not in [place], Appointment needs to be canceled and rescheduled    UDS/CSA-05.04.2022    QUESTIONS:order for sleep study. PA recommended Depakote for sleep. Getting abut four hours. Sleep study shows she is nocturnal and shouldn't take meds until 2am.     Mylene Larios  [facility] Patient Facilitator

## 2023-02-20 NOTE — PATIENT INSTRUCTIONS
----------------------------------------------------------------  Clinic Number:  [phone]   Call with any questions about your care and for scheduling assistance.   Calls are returned Monday through Friday between 8 AM and 4:30 PM. We usually get back to you within 2 business days depending on the issue/request.    If we are prescribing your medications:  For opioid medication refills, call the clinic or send a Pumant message 7 days in advance.  Please include:  Name of requested medication  Name of the pharmacy.  For non-opioid medications, call your pharmacy directly to request a refill. Please allow 3-4 days to be processed.   Per [place] State Law:  All controlled substance prescriptions must be filled within 30 days of being written.    For those controlled substances allowing refills, pickup must occur within 30 days of last fill.      We believe regular attendance is key to your success in our program!    Any time you are unable to keep your appointment we ask that you call us at least 24 hours in advance to cancel.This will allow us to offer the appointment time to another patient.   Multiple missed appointments may lead to dismissal from the clinic.  PLAN:    Will schedule meeting with you and Dr. Gil at the pain center to discuss interventions such as cervical medial branch block, other options.    Begin Keppra (Levetiracetam) to help with the burning pain in your neck and shoulders.  2 and 50 mg tablet 1 daily for 1 week, may increase to 1 twice a day for 1 week, if still symptoms and tolerating to 1 3 times a day.    Follow-up with Dr. Lynn in 6 to 8 weeks.

## 2023-02-21 ENCOUNTER — VIRTUAL VISIT (OUTPATIENT)
Dept: PHYSICAL MEDICINE AND REHAB | Facility: CLINIC | Age: 83
End: 2023-02-21
Payer: MEDICARE

## 2023-02-21 ENCOUNTER — TELEPHONE (OUTPATIENT)
Dept: PALLIATIVE MEDICINE | Facility: OTHER | Age: 83
End: 2023-02-21

## 2023-02-21 DIAGNOSIS — R06.09 POST-COVID CHRONIC DYSPNEA: ICD-10-CM

## 2023-02-21 DIAGNOSIS — R42 DIZZINESS: Primary | ICD-10-CM

## 2023-02-21 DIAGNOSIS — U09.9 POST-COVID CHRONIC FATIGUE: ICD-10-CM

## 2023-02-21 DIAGNOSIS — G47.30 MILD SLEEP APNEA: ICD-10-CM

## 2023-02-21 DIAGNOSIS — U09.9 POST-COVID CHRONIC CONCENTRATION DEFICIT: ICD-10-CM

## 2023-02-21 DIAGNOSIS — R07.89 CHEST DISCOMFORT: ICD-10-CM

## 2023-02-21 DIAGNOSIS — U09.9 POST-COVID CHRONIC DYSPNEA: ICD-10-CM

## 2023-02-21 DIAGNOSIS — R41.840 POST-COVID CHRONIC CONCENTRATION DEFICIT: ICD-10-CM

## 2023-02-21 DIAGNOSIS — R00.2 PALPITATIONS: ICD-10-CM

## 2023-02-21 DIAGNOSIS — G93.32 POST-COVID CHRONIC FATIGUE: ICD-10-CM

## 2023-02-21 DIAGNOSIS — U09.9 LONG COVID: ICD-10-CM

## 2023-02-21 PROCEDURE — 99215 OFFICE O/P EST HI 40 MIN: CPT | Mod: VID | Performed by: PHYSICIAN ASSISTANT

## 2023-02-21 ASSESSMENT — ENCOUNTER SYMPTOMS
ABDOMINAL PAIN: 0
MYALGIAS: 1
WEAKNESS: 1
POLYDIPSIA: 1
TREMORS: 0
SORE THROAT: 1
NERVOUS/ANXIOUS: 1
NAUSEA: 1
HEADACHES: 1
POLYPHAGIA: 1
COUGH: 1
DECREASED CONCENTRATION: 0
DIFFICULTY URINATING: 0
CONSTIPATION: 0
LIGHT-HEADEDNESS: 1
SHORTNESS OF BREATH: 1
CHILLS: 1
HEMATURIA: 0
EYE REDNESS: 0
NUMBNESS: 1
NECK PAIN: 1
TROUBLE SWALLOWING: 1
HALLUCINATIONS: 0
FATIGUE: 1
SEIZURES: 0
ARTHRALGIAS: 1
DYSURIA: 0
JOINT SWELLING: 1
RECTAL PAIN: 0
DIZZINESS: 1
VOMITING: 0
BREAST MASS: 0
SINUS PAIN: 0
BACK PAIN: 1
DIARRHEA: 1
PALPITATIONS: 0
FLANK PAIN: 1
FEVER: 0
WHEEZING: 0
BRUISES/BLEEDS EASILY: 1
HEARTBURN: 0
EYE PAIN: 0

## 2023-02-21 NOTE — PATIENT INSTRUCTIONS
Post COVID Self Care Suggestions:     Fatigue Management:       https://www.archives-pmr.org/action/showPdf?xip=-[email or web address]       Self Care:      [email or web address]  Recovery World Health Organization:    https://apps.who.int/iris/bitstream/handle/26965/428716/SDZ-VYZF-1157-076-97472-74527-eng.pdf  Breathing exercises:    [email or web address]
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
## 2023-02-21 NOTE — NURSING NOTE
Is the patient currently in the state of [place]? YES    Visit mode:VIDEO    If the visit is dropped, the patient can be reconnected by: VIDEO VISIT: Text to cell phone: [phone]    Will anyone else be joining the visit? NO      How would you like to obtain your AVS? MyChart    Are changes needed to the allergy or medication list? NO    *Reason for visit:   3 Month follow-up.     Pt reports new asthma, taking nebulizer and albuterol (seen on medication list)     Maida Loo on 2/21/2023 at 12:28 PM

## 2023-02-21 NOTE — TELEPHONE ENCOUNTER
[facility] Call Center    Phone Message    May a detailed message be left on voicemail: yes     Reason for Call: Other: Pt called to schedule a new assessment with Dr. Gil to discuss injections.  The referral was placed by Dr. Lynn. Writer is not certain how this is to be scheduled as the referral is not consistent with how we schedule according to protocols.    Please call pt back at [phone] to schedule.    Action Taken: Message routed to:  Other: [facility]    Travel Screening: Not Applicable

## 2023-02-22 NOTE — TELEPHONE ENCOUNTER
Called patient and scheduled an Interventional Eval for Thursday 3/30/2023 at 2:15 PM with Dr. Gil (which was one of the provider's first available that wasn't during the current snow storm, or too early in the morning -- patient stated late morning and early afternoon times work best). Added patient to wait list.      Edna Humnoke  Patient Representative  [facility] [place]

## 2023-02-23 ENCOUNTER — VIRTUAL VISIT (OUTPATIENT)
Dept: SLEEP MEDICINE | Facility: CLINIC | Age: 83
End: 2023-02-23
Attending: INTERNAL MEDICINE
Payer: MEDICARE

## 2023-02-23 VITALS — HEIGHT: 63 IN | WEIGHT: 152 LBS | BODY MASS INDEX: 26.93 KG/M2

## 2023-02-23 DIAGNOSIS — F51.04 CHRONIC INSOMNIA: Primary | ICD-10-CM

## 2023-02-23 PROCEDURE — 90791 PSYCH DIAGNOSTIC EVALUATION: CPT | Mod: VID | Performed by: PSYCHOLOGIST

## 2023-02-23 ASSESSMENT — PAIN SCALES - GENERAL: PAINLEVEL: SEVERE PAIN (6)

## 2023-02-23 NOTE — PROGRESS NOTES
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 problem      Priority: Medium     History of posttraumatic stress disorder (PTSD)      Priority: Medium     Bladder spasms      Priority: Medium     Hydronephrosis 02/13/2019     Priority: Medium     Added automatically from request for surgery [id number]         Hematuria 02/07/2019     Priority: Medium     Other chronic pulmonary embolism without acute cor pulmonale (H) 01/26/2019     Priority: Medium     Coronary artery disease involving native coronary artery of native heart with angina pectoris (H) 09/25/2018     Priority: Medium     Bilateral carotid artery stenosis 07/26/2018     Priority: Medium     Derangement of meniscus 05/10/2018     Priority: Medium     Sciatic neuropathy 04/11/2018     Priority: Medium     Pain in right knee 04/11/2018     Priority: Medium     Gastroesophageal reflux disease without esophagitis 05/02/2017     Priority: Medium     Snoring 04/24/2017     Priority: Medium     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     Priority: Medium     Obstruction of biliary tree 02/08/2017     Priority: Medium     Obstruction of common bile duct 02/08/2017     Priority: Medium     Elevated lipase 12/13/2016     Priority: Medium     Generalized abdominal pain 11/29/2016     Priority: Medium      Temporomandibular joint-pain-dysfunction syndrome (TMJ) 09/24/2016     Priority: Medium     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      Priority: Medium     Lumbar radiculopathy 08/12/2016     Priority: Medium     Cluster headaches 01/14/2016     Priority: Medium     Right rotator cuff tear 11/03/2015     Priority: Medium     Insomnia 09/17/2015     Priority: Medium     Hypercholesterolemia 09/17/2015     Priority: Medium     Osteopenia 08/10/2015     Priority: Medium     Major depression 08/10/2015     Priority: Medium     Seeing psychiatry         Focal glomerular sclerosis 07/29/2015     Priority: Medium     RSD upper limb, right 07/29/2015     Priority: Medium     Replacement Utility updated for latest IMO load         Anemia 07/22/2015     Priority: Medium     RSD lower limb, bilateral 07/02/2015     Priority: Medium     Replacement Utility updated for latest IMO load         ADD (attention deficit disorder) 07/02/2015     Priority: Medium     Schizoaffective disorder (H) 08/06/2014     Priority: Medium     Hypertension 01/22/2013     Priority: Medium     Hypothyroidism 01/22/2013     Priority: Medium     Chronic pain 01/22/2013     Priority: Medium     Reflex sympathetic dystrophy 01/22/2013     Priority: Medium     Spinal stenosis of cervical region 08/18/1999     Priority: Medium     Degeneration of cervical intervertebral disc 07/30/1999     Priority: Medium     Cervical radiculopathy 07/30/1999     Priority: Medium     Patient Active Problem List   Diagnosis     Focal glomerular sclerosis     RSD lower limb, bilateral     ADD (attention deficit disorder)     RSD upper limb, right     Osteopenia     Major depression     Hypertension     Insomnia     Hypercholesterolemia     Right rotator cuff tear     Cluster headaches     Lumbar radiculopathy     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     Temporomandibular joint-pain-dysfunction syndrome (TMJ)     Hypothyroidism     Chronic pain      Snoring     Generalized abdominal pain     Bilateral carotid artery stenosis     Coronary artery disease involving native coronary artery of native heart with angina pectoris (H)     Other chronic pulmonary embolism without acute cor pulmonale (H)     Hematuria     Hydronephrosis     Bladder spasms     Anxiety disorder due to medical condition     Family relationship problem     History of posttraumatic stress disorder (PTSD)     Generalized muscle weakness     Myofascial pain     Moderate major depression (H)     Elevated lipase     Abdominal bloating     Acquired absence of other specified parts of digestive tract     Ampullary stenosis     Obstruction of biliary tree     Anemia     Aphthous ulcer of ileum     Disorder of phosphorus metabolism     Edema     Gastroesophageal reflux disease without esophagitis     Obstruction of common bile duct     Overflow diarrhea     History of partial colectomy     Reflex sympathetic dystrophy     Solitary left kidney     Flank pain     Hypocitraturia     Primary osteoarthritis of both knees     Iron deficiency anemia     Proteinuria     Concussion with loss of consciousness     Schizoaffective disorder (H)     Muscle weakness (generalized)     Shuffling gait     Falls frequently     Long term current use of anticoagulant therapy     COPD (chronic obstructive pulmonary disease) (H)     CKD (chronic kidney disease) stage 4, GFR 15-29 ml/min (H)     Degeneration of cervical intervertebral disc     Derangement of meniscus     Intractable chronic migraine without aura     Occipital neuralgia     Post-traumatic headache     S/p nephrectomy     Sciatic neuropathy     Pain in right knee     Leg pain, bilateral     Cervical radiculopathy     Spinal stenosis of cervical region     Fibrositis of neck     Hypokalemia     Diarrhea, unspecified type     Hypotension due to hypovolemia        Mental Health History     Prior Mental Health Diagnosis:   major depressive disorder and generalized  anxiety disorder    Mental Health Treatment:   primary care medication management, Venlafaxine, trazodone    Chemical Abuse/Treatment:    None reported      Family History     Family History   Problem Relation Age of Onset     Heart Disease Mother      Kidney Disease Mother      Aortic aneurysm Mother      Dementia Mother      Heart Disease Father      Cerebrovascular Disease Father      Kidney Disease Father      Alzheimer Disease Sister      Dementia Sister      Sleep Apnea Sister      Other - See Comments Brother         homicide     Dementia Maternal Grandmother        Social History     Social History     Socioeconomic History     Marital status:      Spouse name: None     Number of children: None     Years of education: None     Highest education level: None   Tobacco Use     Smoking status: Former     Packs/day: 1.00     Years: 20.00     Pack years: 20.00     Types: Cigarettes     Quit date: 2000     Years since quittin.1     Smokeless tobacco: Never   Vaping Use     Vaping Use: Never used   Substance and Sexual Activity     Alcohol use: No     Drug use: No     Sexual activity: Never     Social Determinants of Health     Financial Resource Strain: Medium Risk     Difficulty of Paying Living Expenses: Somewhat hard   Food Insecurity: No Food Insecurity     Worried About Running Out of Food in the Last Year: Never true     Ran Out of Food in the Last Year: Never true   Transportation Needs: No Transportation Needs     Lack of Transportation (Medical): No     Lack of Transportation (Non-Medical): No   Housing Stability: Low Risk      Unable to Pay for Housing in the Last Year: No     Number of Places Lived in the Last Year: 1     Unstable Housing in the Last Year: No       Retired     Mental Status Examination     Sandy presented as oriented X3 with speech and language intact.  The patient was cooperative throughout the evaluation with no signs of hallucinations, delusions, loosening of  associations or other thought disturbance.  Mood was normal Affect was congruent with mood. Insight and judgement were intact.  Memory appeared intact for recent and remote elements.  There was no report of suicidal ideation, intention or plan. Attention and concentration were within normal.        Rudolph Gamez PsyD, LP, [facility]  Diplomate, Behavioral Sleep Medicine  [facility]      Copy:   Caitlyn Rees MD  [address] [place] [address] [place] [id number]    Note: This dictation was created using voice recognition software. This document may contain an error not identified before finalizing the document. If the error changes the accuracy of the document, I would appreciate it being brought to my attention.

## 2023-02-23 NOTE — PATIENT INSTRUCTIONS
Recommendations:    1.  Get up every day by 9 AM.  2.  Set her alarm for 9 AM and places away from your bed.  3.  When you get out of bed go directly to your living room or kitchen and use your bright light box for 30-45 minutes.  4.  If your cough is not has a preset timer, prepared in the evening so that you have fresh coffee waiting for you in the morning when you get up.  5.  Stop using cell phone and your tablet by 11 PM  6.  Consider getting a Willi paper white reader which does not affect your sleep and allows you to read in the evening before going to bed.  7.  Continue to follow with your physician around use of trazodone and follow your doctor's orders about when to take it.

## 2023-02-23 NOTE — NURSING NOTE
Is the patient currently in the state of [place]? YES    Visit mode:VIDEO    If the visit is dropped, the patient can be reconnected by: VIDEO VISIT: Text to cell phone: [phone]    Will anyone else be joining the visit? NO      How would you like to obtain your AVS? MyChart    Are changes needed to the allergy or medication list? NO    Reason for visit: randolph Carrizales

## 2023-03-02 ENCOUNTER — OFFICE VISIT (OUTPATIENT)
Dept: CARDIOLOGY | Facility: CLINIC | Age: 83
End: 2023-03-02
Attending: PHYSICIAN ASSISTANT
Payer: MEDICARE

## 2023-03-02 ENCOUNTER — TELEPHONE (OUTPATIENT)
Dept: FAMILY MEDICINE | Facility: CLINIC | Age: 83
End: 2023-03-02

## 2023-03-02 VITALS
HEART RATE: 96 BPM | DIASTOLIC BLOOD PRESSURE: 64 MMHG | SYSTOLIC BLOOD PRESSURE: 102 MMHG | BODY MASS INDEX: 27.25 KG/M2 | HEIGHT: 63 IN | RESPIRATION RATE: 12 BRPM | WEIGHT: 153.8 LBS

## 2023-03-02 DIAGNOSIS — R07.89 CHEST DISCOMFORT: ICD-10-CM

## 2023-03-02 DIAGNOSIS — N18.4 CKD (CHRONIC KIDNEY DISEASE) STAGE 4, GFR 15-29 ML/MIN (H): ICD-10-CM

## 2023-03-02 DIAGNOSIS — I65.23 BILATERAL CAROTID ARTERY STENOSIS: ICD-10-CM

## 2023-03-02 DIAGNOSIS — R42 DIZZINESS: ICD-10-CM

## 2023-03-02 DIAGNOSIS — I25.10 CORONARY ARTERY DISEASE INVOLVING NATIVE CORONARY ARTERY OF NATIVE HEART WITHOUT ANGINA PECTORIS: Primary | ICD-10-CM

## 2023-03-02 DIAGNOSIS — I10 PRIMARY HYPERTENSION: ICD-10-CM

## 2023-03-02 DIAGNOSIS — E78.5 DYSLIPIDEMIA, GOAL LDL BELOW 70: ICD-10-CM

## 2023-03-02 DIAGNOSIS — I27.82 OTHER CHRONIC PULMONARY EMBOLISM WITHOUT ACUTE COR PULMONALE (H): ICD-10-CM

## 2023-03-02 DIAGNOSIS — R00.2 PALPITATIONS: ICD-10-CM

## 2023-03-02 LAB
CHOLEST SERPL-MCNC: 217 MG/DL
FASTING STATUS PATIENT QL REPORTED: ABNORMAL
HDLC SERPL-MCNC: 80 MG/DL
LDLC SERPL CALC-MCNC: 113 MG/DL
TRIGL SERPL-MCNC: 118 MG/DL

## 2023-03-02 PROCEDURE — 99215 OFFICE O/P EST HI 40 MIN: CPT | Performed by: INTERNAL MEDICINE

## 2023-03-02 PROCEDURE — 36415 COLL VENOUS BLD VENIPUNCTURE: CPT | Performed by: INTERNAL MEDICINE

## 2023-03-02 PROCEDURE — 80061 LIPID PANEL: CPT | Performed by: INTERNAL MEDICINE

## 2023-03-02 RX ORDER — ALLOPURINOL 100 MG/1
200 TABLET ORAL DAILY
COMMUNITY
Start: 2023-03-01 | End: 2023-06-01

## 2023-03-02 NOTE — NURSING NOTE
Patient brought in own cuff 115/71 86 wrist right. [employer] brand .    141/96 P104   Concerned about BP and pulse

## 2023-03-02 NOTE — LETTER
3/2/2023    Caitlyn Rees MD  [address]  [facility] [address]    RE: Sandy Spencer       Dear Colleague,     I had the pleasure of seeing Sandy Spencer in the [facility].            Assessment/Plan:   1.  Coronary artery disease: Her shortness of breath has been much improved.  She has no chest pain.  Her stress cardiac MRI in July 2022 was negative for inducible myocardial ischemia, no previous myocardial infarction or myocardial scar.  No obvious valvular disease.   Continue carvedilol and Crestor.  The patient complains of needle sticking chest pain, fluttering heartbeats, dizziness.  Those are nonspecific, most likely noncardiac.  This could be related to her anxiety.  No indication for further cardiac evaluation at this time.    2.  Primary hypertension: Her blood pressure is controlled with the carvedilol 3.125 mg twice a day.     3.  Dyslipidemia: Her last LDL was 102.   Continue Crestor 40 mg at bedtime.  Repeat lipid profile today and then decide if she needs PCSK9 inhibitor.     4.  Carotid artery stenosis status post left CEA: Recent carotid ultrasound was reported mild bilateral carotid artery stenosis less than 50%.  Aggressive control LDL.     5.  Pulmonary embolism on warfarin, stage III CKD and other chronic medical issues: Continue to follow-up with Dr. Rees.  She is on Eliquis 5 mg twice a day.  Her creatinine has been stable.    Total 50 minutes were spent in this visit for face to face visit, physical exam, review of current labs/imaging studies, plan for ongoing treatment with >50% spent on counseling and coordination of care as documented in the above mentioned note.      Thank you for the opportunity to be involved in the care of Sandy Spencer. If you have any questions, please feel free to contact me.  I will see the patient again in 6 months and as needed.    Much or all of the text in this note was generated through the use of Dragon  Dictate voice-to-text software. Errors in spelling or words which seem out of context are unintentional. Sound alike errors, in particular, may have escaped editing.       History of Present Illness:   It is my pleasure to see Sandy Spencer at the [facility] clinic for multiple complaints.  Sandy Spencer is an 82 year old female with a medical history of coronary artery disease, essential hypertension, hyperlipidemia, anxiety and depression, pancreatitis, carotid artery stenosis s/p left CEA, pulmonary embolism on warfarin, status post right nephrectomy and chronic kidney disease stage III.    The patient states that she has been taking Eliquis over last 3 years due to pulmonary embolism, renal embolism.  She is running out of Eliquis.  She is here to get the prescription of Eliquis.    She has multiple complaints including fatigue, headaches, insomnia, needle sticking chest pain.  She has no shortness of breath.  She has fluttering heartbeats sometimes, no constant palpitations.  She has no orthopnea, PND or leg edema.  Her blood pressure and heart rate are in normal range today.    Past Medical History:     Patient Active Problem List   Diagnosis     Focal glomerular sclerosis     RSD lower limb, bilateral     ADD (attention deficit disorder)     RSD upper limb, right     Osteopenia     Major depression     Hypertension     Insomnia     Hypercholesterolemia     Right rotator cuff tear     Cluster headaches     Lumbar radiculopathy     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     Temporomandibular joint-pain-dysfunction syndrome (TMJ)     Hypothyroidism     Chronic pain     Snoring     Generalized abdominal pain     Bilateral carotid artery stenosis     Coronary artery disease involving native coronary artery of native heart with angina pectoris (H)     Other chronic pulmonary embolism without acute cor pulmonale (H)     Hematuria     Hydronephrosis     Bladder spasms     Anxiety  "disorder due to medical condition     Family relationship problem     History of posttraumatic stress disorder (PTSD)     Generalized muscle weakness     Myofascial pain     Moderate major depression (H)     Elevated lipase     Abdominal bloating     Acquired absence of other specified parts of digestive tract     Ampullary stenosis     Obstruction of biliary tree     Anemia     Aphthous ulcer of ileum     Disorder of phosphorus metabolism     Edema     Gastroesophageal reflux disease without esophagitis     Obstruction of common bile duct     Overflow diarrhea     History of partial colectomy     Reflex sympathetic dystrophy     Solitary left kidney     Flank pain     Hypocitraturia     Primary osteoarthritis of both knees     Iron deficiency anemia     Proteinuria     Concussion with loss of consciousness     Schizoaffective disorder (H)     Muscle weakness (generalized)     Shuffling gait     Falls frequently     Long term current use of anticoagulant therapy     COPD (chronic obstructive pulmonary disease) (H)     CKD (chronic kidney disease) stage 4, GFR 15-29 ml/min (H)     Degeneration of cervical intervertebral disc     Derangement of meniscus     Intractable chronic migraine without aura     Occipital neuralgia     Post-traumatic headache     S/p nephrectomy     Sciatic neuropathy     Pain in right knee     Leg pain, bilateral     Cervical radiculopathy     Spinal stenosis of cervical region     Fibrositis of neck     Hypokalemia     Diarrhea, unspecified type     Hypotension due to hypovolemia       Past Surgical History:     Past Surgical History:   Procedure Laterality Date     APPENDECTOMY       BELPHAROPTOSIS REPAIR      bilateral     BILE DUCT STENT PLACEMENT       BIOPSY BREAST Left     benign  1970s     CAROTID ENDARTERECTOMY Left 2009     CHOLECYSTECTOMY       COLECTOMY  1978    \"subtotal\"     ERCP W/ SPHICTEROTOMY  01/03/2017    Placement of ventral pancreatic duct stent     ESOPHAGOSCOPY, " "GASTROSCOPY, DUODENOSCOPY (EGD), COMBINED N/A 12/14/2018    Procedure: ENDOSCOPIC ULTRASOUND;  Surgeon: Babak Merida MD;  Location: [facility];  Service: Gastroenterology     EYE SURGERY      Cataract     HC CYSTOSCOPY,INSERT URETERAL STENT Right 2/16/2019    Procedure: Cystoscopy with Retrograde and right stent exchange.;  Surgeon: Jayla De Paz MD;  Location: [facility];  Service: Urology     HYSTERECTOMY       IR INFERIOR VENACAVAGRAM  2/23/2019     IR IVC FILTER PLACEMENT  2/14/2019     IR IVC FILTER PLACEMENT  2/14/2019     IR IVC FILTER REMOVAL  10/16/2019     IR REMOVAL IVC FILTER  10/16/2019     IR VENOCAVAGRAM INFERIOR  2/23/2019     LAPAROTOMY EXPLORATORY  7/2013    after cholecystectomy     LAPAROTOMY EXPLORATORY      after cholecystectomy had surgery for \"something that was nicked\", gravely ill and in ICU for 1 month     LUMBAR LAMINECTOMY Left 8/11/2016    Procedure: LEFT L4-5 HEMILAMINECTOMY / MEDIAL FACETECTOMY & FORAMINOTOMY, RIGHT L5-S1 HEMILAMINECTOMY WITH FACETECTOMY & FORAMINOTOMY;  Surgeon: Litzy Patel MD;  Location: [facility];  Service:      NECK SURGERY  2010    neck muscle repair     NEPHROURETERECTOMY Right 2/20/2019    Procedure: ROBOTIC RIGHT NEPHROURETERECTOMY;  Surgeon: Parker Casillas MD;  Location: [facility];  Service: Urology     OTHER SURGICAL HISTORY      benign breast cyst excision     PICC  2/25/2019          PICC AND MIDLINE TEAM LINE INSERTION  2/9/2019          IL CYSTOURETHROSCOPY W/IRRIG & EVAC CLOTS N/A 2/10/2019    Procedure: CYSTOSCOPY, BLADDER BIOPSY, RIGHT SIDED URETEROSCOPY WITH SELECTED CYTOLOGY, CLOT IRRIGATION;  Surgeon: Parker Casillas MD;  Location: [facility] OR;  Service: Urology     SALPINGOOPHORECTOMY Left 1969     SIGMOIDOSCOPY FLEXIBLE N/A 8/22/2022    Procedure: SIGMOIDOSCOPY WITH BIOPSIES;  Surgeon: Kimberly Talley MD;  Location: [facility]     TONSILLECTOMY  " 1942     TOTAL VAGINAL HYSTERECTOMY  1984       Family History:     Family History   Problem Relation Age of Onset     Heart Disease Mother      Kidney Disease Mother      Aortic aneurysm Mother      Dementia Mother      Heart Disease Father      Cerebrovascular Disease Father      Kidney Disease Father      Alzheimer Disease Sister      Dementia Sister      Sleep Apnea Sister      Other - See Comments Brother         homicide     Dementia Maternal Grandmother         Social History:    reports that she quit smoking about 23 years ago. Her smoking use included cigarettes. She has a 20.00 pack-year smoking history. She has never used smokeless tobacco. She reports that she does not drink alcohol and does not use drugs.    Review of Systems:   12 systems are reviewed negative except for in HPI.    Meds:     Current Outpatient Medications:      albuterol (PROAIR HFA/PROVENTIL HFA/VENTOLIN HFA) 108 (90 Base) MCG/ACT inhaler, Inhale 2 puffs into the lungs every 6 hours, Disp: 18 g, Rfl: 4     allopurinol (ZYLOPRIM) 100 MG tablet, Take 200 mg by mouth daily, Disp: , Rfl:      apixaban ANTICOAGULANT (ELIQUIS) 5 MG tablet, Take 1 tablet (5 mg) by mouth 2 times daily, Disp: 180 tablet, Rfl: 3     apixaban ANTICOAGULANT (ELIQUIS) 5 MG tablet, Take 1 tablet (5 mg) by mouth 2 times daily, Disp: 180 tablet, Rfl: 3     azelastine (ASTELIN) 0.1 % nasal spray, 2 sprays each nostril 1-2x daily as needed for nasal congestion (use nightly for first 2 week), Disp: 30 mL, Rfl: 11     Cholecalciferol (VITAMIN D-3) 125 MCG (5000 UT) TABS, Take 5,000 Units by mouth daily, Disp: , Rfl:      HEMP OIL OR EXTRACT OR OTHER CBD CANNABINOID, NOT MEDICAL CANNABIS,, 50 mg At Bedtime Delta 8 Gummies, Disp: , Rfl:      ipratropium - albuterol 0.5 mg/2.5 mg/3 mL (DUONEB) 0.5-2.5 (3) MG/3ML neb solution, Take 1 vial by nebulization every 6 hours as needed for shortness of breath, wheezing or cough, Disp: , Rfl:      levETIRAcetam (KEPPRA) 250 MG  "tablet, One daily 1 week, one twice a day 1 week, may increase to one 3 times a day, Disp: 60 tablet, Rfl: 1     levothyroxine (SYNTHROID/LEVOTHROID) 50 MCG tablet, Take 1 tablet (50 mcg) by mouth daily, Disp: 90 tablet, Rfl: 3     rosuvastatin (CRESTOR) 40 MG tablet, Take 1 tablet (40 mg) by mouth daily, Disp: 90 tablet, Rfl: 3     torsemide (DEMADEX) 10 MG tablet, Take 1 tablet (10 mg) by mouth daily, Disp: 90 tablet, Rfl: 3     traZODone (DESYREL) 100 MG tablet, Take 4 tablets (400 mg) by mouth At Bedtime, Disp: 360 tablet, Rfl: 1     venlafaxine (EFFEXOR XR) 150 MG 24 hr capsule, TAKE ONE CAPSULE BY MOUTH EVERY DAY, Disp: 90 capsule, Rfl: 0     venlafaxine (EFFEXOR XR) 37.5 MG 24 hr capsule, Take 1 capsule (37.5 mg) by mouth daily In addition to 150 mg daily (total 187.5 mg/day), Disp: 90 capsule, Rfl: 1     zonisamide (ZONEGRAN) 25 MG capsule, Take 2 capsules (50 mg) by mouth At Bedtime, Disp: 60 capsule, Rfl: 3    Current Facility-Administered Medications:      denosumab (PROLIA) injection 60 mg, 60 mg, Subcutaneous, Q6 Months, Caroline Sumner, NP, 60 mg at 01/25/23 1359    Allergies:   Acamprosate, Augmentin, Carbamazepine, Cyclobenzaprine, Mirtazapine, Prednisone, Pregabalin, Sulfa drugs, Sulfamethoxazole-trimethoprim, Zolpidem, Acetaminophen, Amiloride, Amoxicillin, Aspirin, Bupropion, Chromium, Duloxetine hcl, Nsaids, Other drug allergy (see comments), Oxycodone, Serotonin, Sulindac, Tolmetin, Verapamil, and Valproic acid      Objective:      Physical Exam  69.8 kg (153 lb 12.8 oz)  1.6 m (5' 3\")  Body mass index is 27.24 kg/m .  /64 (BP Location: Left arm, Patient Position: Sitting, Cuff Size: Adult Regular)   Pulse 96   Resp 12   Ht 1.6 m (5' 3\")   Wt 69.8 kg (153 lb 12.8 oz)   LMP  (LMP Unknown)   BMI 27.24 kg/m      General Appearance:   Awake, Alert, No acute distress.   HEENT:  Pupil equal and reactive to light. No scleral icterus; the mucous membranes were moist.   Neck: No cervical " bruits. No JVD. No thyromegaly.     Chest: The spine was straight. The chest was symmetric.   Lungs:   Respirations unlabored; Lungs are clear to auscultation. No crackles. No wheezing.   Cardiovascular:   Regular rhythm and rate, normal first and second heart sounds with no murmurs. No rubs or gallops.    Abdomen:  Soft. No tenderness. Non-distended. Bowels sounds are present   Extremities: Equal tibial pulses. No leg edema.   Skin: No rashes or ulcers. Warm, Dry.   Musculoskeletal: No tenderness. No deformity.   Neurologic: Mood and affect are appropriate. No focal deficits.         EKG: Personally reviewed  Sinus rhythm   Low voltage QRS   Possible Septal infarct , age undetermined   Abnormal ECG   When compared with ECG of 05-FEB-2022 11:33,   Premature ventricular complexes are no longer Present   Minimal criteria for Septal infarct is now Present     Cardiac Imaging Studies  ECHO on 3-:  1. Normal left ventricular size and systolic performance with a visually  estimated ejection fraction of 60%.  2. There is mild aortic valve sclerosis without significant stenosis.  3. Normal right ventricular size and systolic performance.    Stress cardiac MRI on 7-:  1.  Pharmacological Regadenoson stress cardiac MRI is negative for inducible myocardial ischemia.   2.  Pharmacological stress ECG is negative for inducible myocardial ischemia.   3. Normal left ventricular size, wall thickness and systolic function. The quantified left ventricular  ejection fraction is 60%.  No myocardial scar is identified.  T1 pre-contrast: 1014 ms.   4.  Normal right ventricular size and systolic function.    5.  Aortic valve is trileaflet with moderate sclerosis with no significant aortic valve stenosis.     6. There is a small pericardial effusion. Normal pericardial thickness.      Carotid US on 9-:  IMPRESSION:  1.  Moderate plaque formation, velocities consistent with less than 50% stenosis in the right internal  carotid artery.  2.  Mild plaque formation, velocities consistent with less than 50% stenosis in the left internal carotid artery.  3.  Flow within the vertebral arteries is antegrade.    Event monitor on 6-:  Sinus rhythm with occasional PACs and short atrial runs. Some of the PACs correlated with symptom events, some did not. One symptom event did not correlate with any abnormalities.  No sustained arrhythmias, pauses, or atrial fibrillation.    Lab Review   Lab Results   Component Value Date     10/15/2021    CO2 23 10/15/2021    BUN 21 10/15/2021     Lab Results   Component Value Date    WBC 4.6 10/15/2021    HGB 11.8 10/15/2021    HCT 36.4 10/15/2021     10/15/2021     10/15/2021     Lab Results   Component Value Date    CHOL 229 (H) 11/29/2022    CHOL 186 10/04/2022    CHOL 129 08/17/2022     Lab Results   Component Value Date     11/29/2022    HDL 76 10/04/2022    HDL 82 08/17/2022     No components found for: LDLCALC  Lab Results   Component Value Date    TRIG 124 11/29/2022    TRIG 84 10/04/2022    TRIG 96 08/17/2022     No results found for: CHOLHDL  Lab Results   Component Value Date    TROPONINI <0.01 03/15/2021     No results found for: BNP  Lab Results   Component Value Date    TSH 0.64 07/13/2021                 Thank you for allowing me to participate in the care of your patient.      Sincerely,     Luz Elena Ybarra MD     [facility]  cc:   Christine Bennett PA-C  [address] [place] [id number]

## 2023-03-02 NOTE — TELEPHONE ENCOUNTER
General Call    Contacts       Type Contact Phone/Fax    03/02/2023 04:45 PM CST Phone (Incoming) Sandy Spencer (Self) [phone] (M)        Reason for Call:  Medication joe    What are your questions or concerns:  Pt is asking that Dr Rees do the joe paper work and RX for her Eliquis.  She is also applying for it to be changed to a Tier 1 so it is cheaper and will be calling them and having fax it to us.      Could we send this information to you in [employer] or would you prefer to receive a phone call?:   Patient would prefer a phone call   Okay to leave a detailed message?: Yes at Cell number on file:    Telephone Information:   Mobile [phone]

## 2023-03-02 NOTE — PROGRESS NOTES
Assessment/Plan:   1.  Coronary artery disease: Her shortness of breath has been much improved.  She has no chest pain.  Her stress cardiac MRI in July 2022 was negative for inducible myocardial ischemia, no previous myocardial infarction or myocardial scar.  No obvious valvular disease.   Continue carvedilol and Crestor.  The patient complains of needle sticking chest pain, fluttering heartbeats, dizziness.  Those are nonspecific, most likely noncardiac.  This could be related to her anxiety.  No indication for further cardiac evaluation at this time.    2.  Primary hypertension: Her blood pressure is controlled with the carvedilol 3.125 mg twice a day.     3.  Dyslipidemia: Her last LDL was 102.   Continue Crestor 40 mg at bedtime.  Repeat lipid profile today and then decide if she needs PCSK9 inhibitor.     4.  Carotid artery stenosis status post left CEA: Recent carotid ultrasound was reported mild bilateral carotid artery stenosis less than 50%.  Aggressive control LDL.     5.  Pulmonary embolism on warfarin, stage III CKD and other chronic medical issues: Continue to follow-up with Dr. Rees.  She is on Eliquis 5 mg twice a day.  Her creatinine has been stable.    Total 50 minutes were spent in this visit for face to face visit, physical exam, review of current labs/imaging studies, plan for ongoing treatment with >50% spent on counseling and coordination of care as documented in the above mentioned note.      Thank you for the opportunity to be involved in the care of Sandy Spencer. If you have any questions, please feel free to contact me.  I will see the patient again in 6 months and as needed.    Much or all of the text in this note was generated through the use of Dragon Dictate voice-to-text software. Errors in spelling or words which seem out of context are unintentional. Sound alike errors, in particular, may have escaped editing.       History of Present Illness:   It is my pleasure to  see Sandy Spencer at the [facility] for multiple complaints.  Sandy Spencer is an 82 year old female with a medical history of coronary artery disease, essential hypertension, hyperlipidemia, anxiety and depression, pancreatitis, carotid artery stenosis s/p left CEA, pulmonary embolism on warfarin, status post right nephrectomy and chronic kidney disease stage III.    The patient states that she has been taking Eliquis over last 3 years due to pulmonary embolism, renal embolism.  She is running out of Eliquis.  She is here to get the prescription of Eliquis.    She has multiple complaints including fatigue, headaches, insomnia, needle sticking chest pain.  She has no shortness of breath.  She has fluttering heartbeats sometimes, no constant palpitations.  She has no orthopnea, PND or leg edema.  Her blood pressure and heart rate are in normal range today.    Past Medical History:     Patient Active Problem List   Diagnosis     Focal glomerular sclerosis     RSD lower limb, bilateral     ADD (attention deficit disorder)     RSD upper limb, right     Osteopenia     Major depression     Hypertension     Insomnia     Hypercholesterolemia     Right rotator cuff tear     Cluster headaches     Lumbar radiculopathy     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     Temporomandibular joint-pain-dysfunction syndrome (TMJ)     Hypothyroidism     Chronic pain     Snoring     Generalized abdominal pain     Bilateral carotid artery stenosis     Coronary artery disease involving native coronary artery of native heart with angina pectoris (H)     Other chronic pulmonary embolism without acute cor pulmonale (H)     Hematuria     Hydronephrosis     Bladder spasms     Anxiety disorder due to medical condition     Family relationship problem     History of posttraumatic stress disorder (PTSD)     Generalized muscle weakness     Myofascial pain     Moderate major depression (H)     Elevated lipase      "Abdominal bloating     Acquired absence of other specified parts of digestive tract     Ampullary stenosis     Obstruction of biliary tree     Anemia     Aphthous ulcer of ileum     Disorder of phosphorus metabolism     Edema     Gastroesophageal reflux disease without esophagitis     Obstruction of common bile duct     Overflow diarrhea     History of partial colectomy     Reflex sympathetic dystrophy     Solitary left kidney     Flank pain     Hypocitraturia     Primary osteoarthritis of both knees     Iron deficiency anemia     Proteinuria     Concussion with loss of consciousness     Schizoaffective disorder (H)     Muscle weakness (generalized)     Shuffling gait     Falls frequently     Long term current use of anticoagulant therapy     COPD (chronic obstructive pulmonary disease) (H)     CKD (chronic kidney disease) stage 4, GFR 15-29 ml/min (H)     Degeneration of cervical intervertebral disc     Derangement of meniscus     Intractable chronic migraine without aura     Occipital neuralgia     Post-traumatic headache     S/p nephrectomy     Sciatic neuropathy     Pain in right knee     Leg pain, bilateral     Cervical radiculopathy     Spinal stenosis of cervical region     Fibrositis of neck     Hypokalemia     Diarrhea, unspecified type     Hypotension due to hypovolemia       Past Surgical History:     Past Surgical History:   Procedure Laterality Date     APPENDECTOMY       BELPHAROPTOSIS REPAIR      bilateral     BILE DUCT STENT PLACEMENT       BIOPSY BREAST Left     benign  1970s     CAROTID ENDARTERECTOMY Left 2009     CHOLECYSTECTOMY       COLECTOMY  1978    \"subtotal\"     ERCP W/ SPHICTEROTOMY  01/03/2017    Placement of ventral pancreatic duct stent     ESOPHAGOSCOPY, GASTROSCOPY, DUODENOSCOPY (EGD), COMBINED N/A 12/14/2018    Procedure: ENDOSCOPIC ULTRASOUND;  Surgeon: Babak Merida MD;  Location: [facility] [place];  Service: Gastroenterology     EYE SURGERY      Cataract     HC " "CYSTOSCOPY,INSERT URETERAL STENT Right 2/16/2019    Procedure: Cystoscopy with Retrograde and right stent exchange.;  Surgeon: Jayla De Paz MD;  [place] [facility] [place]     HYSTERECTOMY       IR INFERIOR VENACAVAGRAM  2/23/2019     IR IVC FILTER PLACEMENT  2/14/2019     IR IVC FILTER PLACEMENT  2/14/2019     IR IVC FILTER REMOVAL  10/16/2019     IR REMOVAL IVC FILTER  10/16/2019     IR VENOCAVAGRAM INFERIOR  2/23/2019     LAPAROTOMY EXPLORATORY  7/2013    after cholecystectomy     LAPAROTOMY EXPLORATORY      after cholecystectomy had surgery for \"something that was nicked\", gravely ill and in ICU for 1 month     LUMBAR LAMINECTOMY Left 8/11/2016    Procedure: LEFT L4-5 HEMILAMINECTOMY / MEDIAL FACETECTOMY & FORAMINOTOMY, RIGHT L5-S1 HEMILAMINECTOMY WITH FACETECTOMY & FORAMINOTOMY;  Surgeon: Litzy Patel MD;  Location: [facility];  Service:      NECK SURGERY  2010    neck muscle repair     NEPHROURETERECTOMY Right 2/20/2019    Procedure: ROBOTIC RIGHT NEPHROURETERECTOMY;  Surgeon: Parker Casillas MD;  [place] [facility] [place]  Service: Urology     OTHER SURGICAL HISTORY      benign breast cyst excision     PICC  2/25/2019          PICC AND MIDLINE TEAM LINE INSERTION  2/9/2019          ME CYSTOURETHROSCOPY W/IRRIG & EVAC CLOTS N/A 2/10/2019    Procedure: CYSTOSCOPY, BLADDER BIOPSY, RIGHT SIDED URETEROSCOPY WITH SELECTED CYTOLOGY, CLOT IRRIGATION;  Surgeon: Parker Casillas MD;  Location: [facility];  Service: Urology     SALPINGOOPHORECTOMY Left 1969     SIGMOIDOSCOPY FLEXIBLE N/A 8/22/2022    Procedure: SIGMOIDOSCOPY WITH BIOPSIES;  Surgeon: Kimberly Talley MD;  Location: [facility]     TONSILLECTOMY  1942     TOTAL VAGINAL HYSTERECTOMY  1984       Family History:     Family History   Problem Relation Age of Onset     Heart Disease Mother      Kidney Disease Mother      Aortic aneurysm Mother      Dementia Mother      Heart " Disease Father      Cerebrovascular Disease Father      Kidney Disease Father      Alzheimer Disease Sister      Dementia Sister      Sleep Apnea Sister      Other - See Comments Brother         homicide     Dementia Maternal Grandmother         Social History:    reports that she quit smoking about 23 years ago. Her smoking use included cigarettes. She has a 20.00 pack-year smoking history. She has never used smokeless tobacco. She reports that she does not drink alcohol and does not use drugs.    Review of Systems:   12 systems are reviewed negative except for in HPI.    Meds:     Current Outpatient Medications:      albuterol (PROAIR HFA/PROVENTIL HFA/VENTOLIN HFA) 108 (90 Base) MCG/ACT inhaler, Inhale 2 puffs into the lungs every 6 hours, Disp: 18 g, Rfl: 4     allopurinol (ZYLOPRIM) 100 MG tablet, Take 200 mg by mouth daily, Disp: , Rfl:      apixaban ANTICOAGULANT (ELIQUIS) 5 MG tablet, Take 1 tablet (5 mg) by mouth 2 times daily, Disp: 180 tablet, Rfl: 3     apixaban ANTICOAGULANT (ELIQUIS) 5 MG tablet, Take 1 tablet (5 mg) by mouth 2 times daily, Disp: 180 tablet, Rfl: 3     azelastine (ASTELIN) 0.1 % nasal spray, 2 sprays each nostril 1-2x daily as needed for nasal congestion (use nightly for first 2 week), Disp: 30 mL, Rfl: 11     Cholecalciferol (VITAMIN D-3) 125 MCG (5000 UT) TABS, Take 5,000 Units by mouth daily, Disp: , Rfl:      HEMP OIL OR EXTRACT OR OTHER CBD CANNABINOID, NOT MEDICAL CANNABIS,, 50 mg At Bedtime Delta 8 Gummies, Disp: , Rfl:      ipratropium - albuterol 0.5 mg/2.5 mg/3 mL (DUONEB) 0.5-2.5 (3) MG/3ML neb solution, Take 1 vial by nebulization every 6 hours as needed for shortness of breath, wheezing or cough, Disp: , Rfl:      levETIRAcetam (KEPPRA) 250 MG tablet, One daily 1 week, one twice a day 1 week, may increase to one 3 times a day, Disp: 60 tablet, Rfl: 1     levothyroxine (SYNTHROID/LEVOTHROID) 50 MCG tablet, Take 1 tablet (50 mcg) by mouth daily, Disp: 90 tablet, Rfl: 3      "rosuvastatin (CRESTOR) 40 MG tablet, Take 1 tablet (40 mg) by mouth daily, Disp: 90 tablet, Rfl: 3     torsemide (DEMADEX) 10 MG tablet, Take 1 tablet (10 mg) by mouth daily, Disp: 90 tablet, Rfl: 3     traZODone (DESYREL) 100 MG tablet, Take 4 tablets (400 mg) by mouth At Bedtime, Disp: 360 tablet, Rfl: 1     venlafaxine (EFFEXOR XR) 150 MG 24 hr capsule, TAKE ONE CAPSULE BY MOUTH EVERY DAY, Disp: 90 capsule, Rfl: 0     venlafaxine (EFFEXOR XR) 37.5 MG 24 hr capsule, Take 1 capsule (37.5 mg) by mouth daily In addition to 150 mg daily (total 187.5 mg/day), Disp: 90 capsule, Rfl: 1     zonisamide (ZONEGRAN) 25 MG capsule, Take 2 capsules (50 mg) by mouth At Bedtime, Disp: 60 capsule, Rfl: 3    Current Facility-Administered Medications:      denosumab (PROLIA) injection 60 mg, 60 mg, Subcutaneous, Q6 Months, Caroline Sumner, NP, 60 mg at 01/25/23 1359    Allergies:   Acamprosate, Augmentin, Carbamazepine, Cyclobenzaprine, Mirtazapine, Prednisone, Pregabalin, Sulfa drugs, Sulfamethoxazole-trimethoprim, Zolpidem, Acetaminophen, Amiloride, Amoxicillin, Aspirin, Bupropion, Chromium, Duloxetine hcl, Nsaids, Other drug allergy (see comments), Oxycodone, Serotonin, Sulindac, Tolmetin, Verapamil, and Valproic acid      Objective:      Physical Exam  69.8 kg (153 lb 12.8 oz)  1.6 m (5' 3\")  Body mass index is 27.24 kg/m .  /64 (BP Location: Left arm, Patient Position: Sitting, Cuff Size: Adult Regular)   Pulse 96   Resp 12   Ht 1.6 m (5' 3\")   Wt 69.8 kg (153 lb 12.8 oz)   LMP  (LMP Unknown)   BMI 27.24 kg/m      General Appearance:   Awake, Alert, No acute distress.   HEENT:  Pupil equal and reactive to light. No scleral icterus; the mucous membranes were moist.   Neck: No cervical bruits. No JVD. No thyromegaly.     Chest: The spine was straight. The chest was symmetric.   Lungs:   Respirations unlabored; Lungs are clear to auscultation. No crackles. No wheezing.   Cardiovascular:   Regular rhythm and rate, normal " first and second heart sounds with no murmurs. No rubs or gallops.    Abdomen:  Soft. No tenderness. Non-distended. Bowels sounds are present   Extremities: Equal tibial pulses. No leg edema.   Skin: No rashes or ulcers. Warm, Dry.   Musculoskeletal: No tenderness. No deformity.   Neurologic: Mood and affect are appropriate. No focal deficits.         EKG: Personally reviewed  Sinus rhythm   Low voltage QRS   Possible Septal infarct , age undetermined   Abnormal ECG   When compared with ECG of 05-FEB-2022 11:33,   Premature ventricular complexes are no longer Present   Minimal criteria for Septal infarct is now Present     Cardiac Imaging Studies  ECHO on 3-:  1. Normal left ventricular size and systolic performance with a visually  estimated ejection fraction of 60%.  2. There is mild aortic valve sclerosis without significant stenosis.  3. Normal right ventricular size and systolic performance.    Stress cardiac MRI on 7-:  1.  Pharmacological Regadenoson stress cardiac MRI is negative for inducible myocardial ischemia.   2.  Pharmacological stress ECG is negative for inducible myocardial ischemia.   3. Normal left ventricular size, wall thickness and systolic function. The quantified left ventricular  ejection fraction is 60%.  No myocardial scar is identified.  T1 pre-contrast: 1014 ms.   4.  Normal right ventricular size and systolic function.    5.  Aortic valve is trileaflet with moderate sclerosis with no significant aortic valve stenosis.     6. There is a small pericardial effusion. Normal pericardial thickness.      Carotid US on 9-:  IMPRESSION:  1.  Moderate plaque formation, velocities consistent with less than 50% stenosis in the right internal carotid artery.  2.  Mild plaque formation, velocities consistent with less than 50% stenosis in the left internal carotid artery.  3.  Flow within the vertebral arteries is antegrade.    Event monitor on 6-:  Sinus rhythm with  occasional PACs and short atrial runs. Some of the PACs correlated with symptom events, some did not. One symptom event did not correlate with any abnormalities.  No sustained arrhythmias, pauses, or atrial fibrillation.    Lab Review   Lab Results   Component Value Date     10/15/2021    CO2 23 10/15/2021    BUN 21 10/15/2021     Lab Results   Component Value Date    WBC 4.6 10/15/2021    HGB 11.8 10/15/2021    HCT 36.4 10/15/2021     10/15/2021     10/15/2021     Lab Results   Component Value Date    CHOL 229 (H) 11/29/2022    CHOL 186 10/04/2022    CHOL 129 08/17/2022     Lab Results   Component Value Date     11/29/2022    HDL 76 10/04/2022    HDL 82 08/17/2022     No components found for: LDLCALC  Lab Results   Component Value Date    TRIG 124 11/29/2022    TRIG 84 10/04/2022    TRIG 96 08/17/2022     No results found for: CHOLHDL  Lab Results   Component Value Date    TROPONINI <0.01 03/15/2021     No results found for: BNP  Lab Results   Component Value Date    TSH 0.64 07/13/2021

## 2023-03-03 NOTE — TELEPHONE ENCOUNTER
Pt called back, relayed message,    The first thing is the joe   She states we did this last year at [facility]. It has to be sent to them yearly.  She can call if questions  [phone] Nahomy Leung    The second thing is her applying for a tier change so if she has to pay for it, it will be less. She cannot afford $500 per month until the joe goes through-they are supposed to be faxing us but we can call [phone] [facility] tier exceptions, ref #[id number]    She only has 2 days left of medication and no way to get more

## 2023-03-03 NOTE — TELEPHONE ENCOUNTER
This paperwork needs to come from [employer], she's going to have to work with them on getting the medication. She may need to pay out of pocket for the next week or two while the paper work is getting worked on

## 2023-03-03 NOTE — TELEPHONE ENCOUNTER
Cardiology already approved Eliquis medication 3/2/23.  No answer on pt's phone to discuss request.        Left message to call back for: patient  Information to relay to patient: what joe paperwork does she need us to do? Will she drop this off or have it faxed?

## 2023-03-08 ENCOUNTER — OFFICE VISIT (OUTPATIENT)
Dept: PALLIATIVE MEDICINE | Facility: OTHER | Age: 83
End: 2023-03-08
Attending: ANESTHESIOLOGY
Payer: MEDICARE

## 2023-03-08 ENCOUNTER — TELEPHONE (OUTPATIENT)
Dept: PALLIATIVE MEDICINE | Facility: OTHER | Age: 83
End: 2023-03-08

## 2023-03-08 VITALS — SYSTOLIC BLOOD PRESSURE: 84 MMHG | DIASTOLIC BLOOD PRESSURE: 50 MMHG | OXYGEN SATURATION: 95 % | HEART RATE: 100 BPM

## 2023-03-08 DIAGNOSIS — M54.2 CERVICALGIA: Primary | ICD-10-CM

## 2023-03-08 DIAGNOSIS — M54.16 LUMBAR RADICULOPATHY: ICD-10-CM

## 2023-03-08 DIAGNOSIS — M54.81 BILATERAL OCCIPITAL NEURALGIA: ICD-10-CM

## 2023-03-08 PROCEDURE — G0463 HOSPITAL OUTPT CLINIC VISIT: HCPCS | Performed by: STUDENT IN AN ORGANIZED HEALTH CARE EDUCATION/TRAINING PROGRAM

## 2023-03-08 PROCEDURE — 99215 OFFICE O/P EST HI 40 MIN: CPT | Performed by: STUDENT IN AN ORGANIZED HEALTH CARE EDUCATION/TRAINING PROGRAM

## 2023-03-08 PROCEDURE — G0463 HOSPITAL OUTPT CLINIC VISIT: HCPCS

## 2023-03-08 ASSESSMENT — PAIN SCALES - PAIN ENJOYMENT GENERAL ACTIVITY SCALE (PEG)
AVG_PAIN_PASTWEEK: 7
PEG_TOTALSCORE: 5.67
PEG_TOTALSCORE: 5.67
INTERFERED_ENJOYMENT_LIFE: 3
INTERFERED_GENERAL_ACTIVITY: 7
AVG_PAIN_PASTWEEK: 7
INTERFERED_GENERAL_ACTIVITY: 7
INTERFERED_ENJOYMENT_LIFE: 3

## 2023-03-08 ASSESSMENT — ANXIETY QUESTIONNAIRES
7. FEELING AFRAID AS IF SOMETHING AWFUL MIGHT HAPPEN: NOT AT ALL
6. BECOMING EASILY ANNOYED OR IRRITABLE: SEVERAL DAYS
7. FEELING AFRAID AS IF SOMETHING AWFUL MIGHT HAPPEN: NOT AT ALL
GAD7 TOTAL SCORE: 2
2. NOT BEING ABLE TO STOP OR CONTROL WORRYING: NOT AT ALL
IF YOU CHECKED OFF ANY PROBLEMS ON THIS QUESTIONNAIRE, HOW DIFFICULT HAVE THESE PROBLEMS MADE IT FOR YOU TO DO YOUR WORK, TAKE CARE OF THINGS AT HOME, OR GET ALONG WITH OTHER PEOPLE: VERY DIFFICULT
5. BEING SO RESTLESS THAT IT IS HARD TO SIT STILL: NOT AT ALL
4. TROUBLE RELAXING: NOT AT ALL
3. WORRYING TOO MUCH ABOUT DIFFERENT THINGS: SEVERAL DAYS
1. FEELING NERVOUS, ANXIOUS, OR ON EDGE: NOT AT ALL
GAD7 TOTAL SCORE: 2
8. IF YOU CHECKED OFF ANY PROBLEMS, HOW DIFFICULT HAVE THESE MADE IT FOR YOU TO DO YOUR WORK, TAKE CARE OF THINGS AT HOME, OR GET ALONG WITH OTHER PEOPLE?: VERY DIFFICULT

## 2023-03-08 ASSESSMENT — PAIN SCALES - GENERAL: PAINLEVEL: MODERATE PAIN (4)

## 2023-03-08 NOTE — PATIENT INSTRUCTIONS
Procedures: there are several relevant procedures to consider, based on our examination and the patient's history. We will address these from highest to lower yield and be mindful of total body steroid dosing.  Trigger point injection in neck  At least 1 month later, we will perform a cervical epidural steroid injection  We can consider a right suprascapular nerve block for her chronic nonsurgical rotator cuff tear  We can consider a caudal ANTONY in the future for her left L5 radiculopathy symptoms in a postsurgical back  Physical Therapy: Prior to August 2022 - occasionally doing HEP  Diagnostic Studies: reviewed MRI C spine and L spine  Medication Management: per Dr. Lynn  Follow up: with Dr. Lynn as scheduled, but I would also like to meet patient in 3 months for a re-evaluation of interventional management    Ronnie Gil MD  Interventional Pain Medicine  [facility] [place] Number:  [phone]  Call with any questions about your care and for scheduling assistance.   Calls are returned Monday through Friday between 8 AM and 4:30 PM. We usually get back to you within 2 business days depending on the issue/request.    If we are prescribing your medications:  For opioid medication refills, call the clinic or send a [employer] message 7 days in advance.  Please include:  Name of requested medication  Name of the pharmacy.  For non-opioid medications, call your pharmacy directly to request a refill. Please allow 3-4 days to be processed.   Per [place] State Law:  All controlled substance prescriptions must be filled within 30 days of being written.    For those controlled substances allowing refills, pickup must occur within 30 days of last fill.      We believe regular attendance is key to your success in our program!    Any time you are unable to keep your appointment we ask that you call us at least 24 hours in advance to  cancel.This will allow us to offer the appointment time to another patient.   Multiple missed appointments may lead to dismissal from the clinic.

## 2023-03-08 NOTE — TELEPHONE ENCOUNTER
Pre-screening Questions for Clinic Based Injections: Trigger point injections and left greater occipital nerve block     Botox- no questions needed  *of note, it is possible to do occipital nerve blocks and trigger point injections without steroid, so if they feel they need to schedule, we can do that and leave out steroid.    Location:    [place]: Ultrasound appointments NOT available at this time    [place]: Ultrasound appointments NOT available at this time    [place]:  Ultrasound appointments NOT available at this time    [place]: Do not double book any Ultrasound Procedures    [place]:  Please send to RN pool after scheduling to verify U/S availability     IF SCHEDULING IN [place] PLEASE SCHEDULE AT LEAST 7-10 BUSINESS DAYS OUT SO A PA CAN BE OBTAINED    Does patient have an active infection or treated for one within the past week? No  (If YES, do NOT schedule and route to RN)     Is patient currently taking any antibiotics?  No  (If YES, do NOT schedule and route to RN)  For patients on chronic, preventative or prophylactic antibiotics, procedures can be scheduled.    Stanislaw Cho Snitzer-antibiotic course must have been completed for 4 days    Is patient taking any aspirin products? No     No need to hold non-aspirin anticoagulants.     If taking > 325mg/day of aspirin, limit aspirin to 81-325mg/day x 1 week.     No hold required day of procedure.    Has the patient had a flu shot or any other vaccinations within 7 days before or after the procedure.  No     Have you recently had a COVID vaccine or have plans to get it in the near future? No    If yes, explain that for the vaccine to work best they need to:       wait 1 week before and 1 week after getting Vaccine #1    wait 1 week before and 2 weeks after getting Vaccine #2    If patient has concerns about the timing, send to RN pool
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
## 2023-03-30 ENCOUNTER — TRANSFERRED RECORDS (OUTPATIENT)
Dept: HEALTH INFORMATION MANAGEMENT | Facility: CLINIC | Age: 83
End: 2023-03-30

## 2023-04-12 ENCOUNTER — TELEPHONE (OUTPATIENT)
Dept: PALLIATIVE MEDICINE | Facility: OTHER | Age: 83
End: 2023-04-12
Payer: MEDICARE

## 2023-04-12 DIAGNOSIS — M54.16 LUMBAR RADICULOPATHY: Primary | ICD-10-CM

## 2023-04-12 NOTE — TELEPHONE ENCOUNTER
Patient is calling to get schedule for low back injection in the past appt patient talked about few things and she would like to have caudal ANTONY in the future for her left L5 radiculopathy symptoms     Please advise     Mylene

## 2023-04-12 NOTE — TELEPHONE ENCOUNTER
ANTICOAGULATION  MANAGEMENT     Sandy Spencer, a 80 y.o. female with prolonged subtherapeutic levels to be offered Lovenox bridge while subtheraeptuic.     Goal INR Range: 2-3    Wt Readings from Last 3 Encounters:   11/17/20 140 lb 5 oz (63.6 kg)   11/11/20 142 lb (64.4 kg)   09/16/20 141 lb 4.8 oz (64.1 kg)      Ideal body weight: 50.1 kg (110 lb 7.2 oz)  Adjusted ideal body weight: 55.5 kg (122 lb 6.3 oz)     Lab Results   Component Value Date    INR 1.60 (H) 11/24/2020    INR 1.90 (H) 11/17/2020    INR 1.50 (H) 10/27/2020     Lab Results   Component Value Date    HGB 11.0 (L) 09/16/2020    HCT 33.2 (L) 09/16/2020     09/16/2020     Lab Results   Component Value Date    CREATININE 1.49 (H) 11/17/2020    CREATININE 1.57 (H) 10/07/2020    CREATININE 1.33 (H) 09/16/2020      Estimated Creatinine Clearance: 27.3 mL/min (A) (by C-G formula based on SCr of 1.49 mg/dL (H)).     Recommended Lovenox dose is: 60 mg Q 24 hours ( 1 mg/kg Q 24 hrs for CrCl 15-30 ml/min)       122.5

## 2023-04-13 ENCOUNTER — TELEPHONE (OUTPATIENT)
Dept: PALLIATIVE MEDICINE | Facility: OTHER | Age: 83
End: 2023-04-13
Payer: MEDICARE

## 2023-04-13 DIAGNOSIS — M54.16 LUMBAR RADICULOPATHY: Primary | ICD-10-CM

## 2023-04-13 NOTE — TELEPHONE ENCOUNTER
Patient Is scheduled for 4/27/23 patient is on a waiting list and is really in a lot of pain and she might go to urgent care if it get any worse.

## 2023-04-13 NOTE — TELEPHONE ENCOUNTER
Screening Questions for Radiology Injections:Caudal ANTONY    Injection to be done at which interventional clinic site? [facility]    Procedure ordered by DR. MATAMOROS    Procedure ordered? Caudal ANTONY    Transforaminal Cervical ANTONY - Send to [facility] ([facility]) - No  Community Site providers perform this procedure    What insurance would patient like us to bill for this procedure? MEDICARE/MEDICARE/COMMERCIAL/AARP      IF SCHEDULING IN [place] [facility] PLEASE SCHEDULE AT LEAST 7-10 BUSINESS DAYS OUT SO A PA CAN BE OBTAINED    Worker's comp or MVA (motor vehicle accident) -Any injection DO NOT SCHEDULE and route to Wicho Damon.      [employer] insurance - For SI joint injections, DO NOT SCHEDULE and route to Alejandra Alli.       ALL BCBS, [employer] and [employer]IG[employer] - DO NOT SCHEDULE and route to Alejandra Carson    MEDICA- facet joint injections, route to Alejandra Alli    Is patient scheduled at [facility]? YES    If YES, route every encounter to [facility] CARE NAVIGATION POOL [[phone]]    Is an  needed? No     Patient has a  home? (Review Grid) YES: HAS BEEN AWARE OF NEEDING A      Any chance of pregnancy? Not Applicable   If YES, do NOT schedule and route to RN pool  - Dr. Pike route to Sammie Somers and PM&R Nurse  [[id number]]      Is patient actively being treated for cancer or immunocompromised? No  If YES, do NOT schedule and route to RN pool/ Dr. Pike's Team    Does the patient have a bleeding or clotting disorder? No     If YES, okay to schedule AND route to RN nurse myra/ Dr. Pike's Team     (For any patients with platelet count <100, RN must forward to provider)    Is patient taking any Blood Thinners OR Antiplatelet medication?  Yes - ELIQUIS   If hold needed, do NOT schedule, route to RN pool/ Dr. Pike's Team    Examples:   o Blood Thinners: (Coumadin, Warfarin, Jantoven, Pradaxa, Xarelto, Eliquis, Edoxaban, Enoxaparin, Lovenox, Heparin, Arixtra, Fondaparinux or  Fragmin)  o Antiplatelet Medications: (Plavix, Brilinta or Effient)     Is patient taking any aspirin products (includes Excedrin and Fiorinal)? No     If more than 325mg/day, OK to schedule; Instruct Pt to decrease to less than 325 mg for 7 days AND route to RN pool/ Dr. Pike's Team     For CERVICAL procedures, hold all aspirin products for 6 days.     Tell Pt that if aspirin product is not held for 6 days, the procedure WILL BE cancelled.     Any allergies to contrast dye, iodine, shellfish, or numbing and steroid medications? No    If YES, schedule and add allergy information to appointment notes AND route to the RN pool/ Dr. Pike's Team    If ANTONY and Contrast Dye / Iodine Allergy? DO NOT SCHEDULE, route to RN pool/ Dr. Pike's Team    Allergies: Acamprosate, Augmentin, Carbamazepine, Cyclobenzaprine, Mirtazapine, Prednisone, Pregabalin, Sulfa drugs, Sulfamethoxazole-trimethoprim, Zolpidem, Acetaminophen, Amiloride, Amoxicillin, Aspirin, Bupropion, Chromium, Duloxetine hcl, Nsaids, Other drug allergy (see comments), Oxycodone, Serotonin, Sulindac, Tolmetin, Verapamil, and Valproic acid     Does patient have an active infection or treated for one within the past week? No    Is patient currently taking any antibiotics or steroid medications?  No     For patients on chronic, preventative, or prophylactic antibiotics, procedures may be scheduled.     For patients on antibiotics for active or recent infection, schedule 4 days after completed.    For patients on steroid medications, schedule 4 days after completed.     Has the patient had a flu shot or any other vaccinations within the past 7 days? No  If yes, explain that for the vaccine to work best they need to:       wait 1 week before and 1 week after getting any Vaccine    wait 1 week before and 2 weeks after getting Covid Vaccine #2 or BOOSTER    If patient has concerns about the timing, send to RN pool/ Dr. Pike's Team    Does patient have an MRI/CT?   YES: YES    Include Date and Check Procedure Scheduling Grid to see if required.    Was the MRI/CT done within the last 3 years?  Yes     If no route to RN Pool/ Dr. Pike's Team    If yes, where was the MRI/CT done? [facility] 12/28/21     Refer to PACS Transmissions list for approved external locations and route to RN Pool High Priority/ Dr. Zayass Team    If MRI was not done at approved external location do NOT schedule and route to RN pool/ Dr. Zayass Team      If patient has an imaging disc, the injection MAY be scheduled but patient must bring disc to appt or appt will be cancelled.    Is patient able to transfer to a procedure table with minimal or no assistance? Yes     If no, do NOT schedule and route to RN Pool/ Dr. Pike's Team    Procedure Specific Instructions:    If celiac plexus block, informed patient NPO for 6 hours and that it is okay to take medications with sips of water, especially blood pressure medications Not Applicable         If this is for a cervical procedure, informed patient that aspirin needs to be held for 6 days.   Not Applicable      Sedation, If Sedation is ordered for any procedure, patient must be NPO for 6 hours prior to procedure Not Applicable      If IV needed:    Do not schedule procedures requiring IV placement in the first appointment of the day or first appointment after lunch. Do NOT schedule at 0745, 0815 or 1245. N/A    Instructed patient to arrive 30 minutes early for IV start if required. (Check Procedure Scheduling Grid)  Not Applicable    Reminders:    If you are started on any steroids or antibiotics between now and your appointment, you must contact us because the procedure may need to be cancelled.  NO  PT AWARE TO CONTACT THE CLINIC ANS SPEAK TO A NURSE       As a reminder, receiving steroids can decrease your body's ability to fight infection.   Would you still like to move forward with scheduling the injection?  Yes      IV Sedation is not provided for  procedures. If oral anti-anxiety medication is needed, the patient should request this from their referring provider.      Instruct patient to arrive as directed prior to the scheduled appointment time:  If IV needed 30 minutes before appointment time       For patients 85 or older we recommend having an adult stay w/ them for the remainder of the day.       If the patient is Diabetic, remind them to bring their glucometer.      Does the patient have any questions?  HEATHER Rodgers  [facility]

## 2023-04-13 NOTE — CONFIDENTIAL NOTE
Patient is taking Eliquis    Blood thinner to be held per procedure grid: caudal ANTONY  Apt is scheduled for: 4/27/23  Advise patient to hold x 3 days per protocol for, restart after 24 hours anticoagulation intermediate risk procedure    Order placed for procedure    Will route to provider managing this medication to ensure this hold is ok  Once determined will contact the patient.    Please advise on the holding of Eloquis x 3 days prior to 4/27/23

## 2023-04-17 NOTE — TELEPHONE ENCOUNTER
Per heart care provider:    JUAN  It is fine to hold anticoagulation per pain center protocol.   Thanks   Wilbert      Will contact patient to review the hold 3 days prior to 4/27/23 pre protocol.

## 2023-04-21 ENCOUNTER — OFFICE VISIT (OUTPATIENT)
Dept: PALLIATIVE MEDICINE | Facility: OTHER | Age: 83
End: 2023-04-21
Payer: MEDICARE

## 2023-04-21 VITALS
DIASTOLIC BLOOD PRESSURE: 75 MMHG | OXYGEN SATURATION: 95 % | HEART RATE: 90 BPM | WEIGHT: 146 LBS | BODY MASS INDEX: 25.86 KG/M2 | SYSTOLIC BLOOD PRESSURE: 117 MMHG

## 2023-04-21 DIAGNOSIS — M54.16 LUMBAR RADICULOPATHY: Primary | ICD-10-CM

## 2023-04-21 PROCEDURE — G0463 HOSPITAL OUTPT CLINIC VISIT: HCPCS

## 2023-04-21 PROCEDURE — 99214 OFFICE O/P EST MOD 30 MIN: CPT | Performed by: ANESTHESIOLOGY

## 2023-04-21 RX ORDER — LIDOCAINE 50 MG/G
1 PATCH TOPICAL EVERY 24 HOURS
Qty: 30 PATCH | Refills: 1 | Status: SHIPPED | OUTPATIENT
Start: 2023-04-21 | End: 2023-05-12

## 2023-04-21 RX ORDER — TRAMADOL HYDROCHLORIDE 50 MG/1
TABLET ORAL
Qty: 60 TABLET | Refills: 1 | Status: SHIPPED | OUTPATIENT
Start: 2023-04-21 | End: 2023-06-23

## 2023-04-21 ASSESSMENT — PAIN SCALES - GENERAL: PAINLEVEL: EXTREME PAIN (8)

## 2023-04-21 NOTE — PATIENT INSTRUCTIONS
[facility] Number:  [phone]  Call with any questions about your care and for scheduling assistance.   Calls are returned Monday through Friday between 8 AM and 4:30 PM. We usually get back to you within 2 business days depending on the issue/request.    If we are prescribing your medications:  For opioid medication refills, call the clinic or send a Prolifyt message 7 days in advance.  Please include:  Name of requested medication  Name of the pharmacy.  For non-opioid medications, call your pharmacy directly to request a refill. Please allow 3-4 days to be processed.   Per [place] State Law:  All controlled substance prescriptions must be filled within 30 days of being written.    For those controlled substances allowing refills, pickup must occur within 30 days of last fill.      We believe regular attendance is key to your success in our program!    Any time you are unable to keep your appointment we ask that you call us at least 24 hours in advance to cancel.This will allow us to offer the appointment time to another patient.   Multiple missed appointments may lead to dismissal from the clinic.     PLAN:    You are scheduled for/27 for a caudal injection with Dr. Gil that should help your back pain.    Lidocaine patches, may apply 1 patch daily to your low back.    Tramadol to help with back pain until your injection 50 mg tablets 1 to 2 tablets 3 times a day as needed.    Follow-up with Dr. Lynn for an appointment in 2 months

## 2023-04-21 NOTE — PROGRESS NOTES
Patient presents to the clinic today for a follow up with ARELIS FITZPATRICK MD            6/29/2022     2:46 PM 4/21/2023     2:49 PM 4/21/2023     2:50 PM   PEG Score   PEG Total Score 9 7 7       UDS/CSA- 5/4/22    Medications-    QUESTIONS:    Daina Quinn MA  [facility]

## 2023-04-21 NOTE — LETTER
Opioid / Opioid Plus Controlled Substance Agreement    This is an agreement between you and your provider about the safe and appropriate use of controlled substance/opioids prescribed by your care team. Controlled substances are medicines that can cause physical and mental dependence (abuse).    There are strict laws about having and using these medicines. We here at [facility] are committing to working with you in your efforts to get better. To support you in this work, we ll help you schedule regular office appointments for medicine refills. If we must cancel or change your appointment for any reason, we ll make sure you have enough medicine to last until your next appointment.     As a Provider, I will:    Listen carefully to your concerns and treat you with respect.     Recommend a treatment plan that I believe is in your best interest. This plan may involve therapies other than opioid pain medication.     Talk with you often about the possible benefits, and the risk of harm of any medicine that we prescribe for you.     Provide a plan on how to taper (discontinue or go off) using this medicine if the decision is made to stop its use.    As a Patient, I understand that opioid(s):     Are a controlled substance prescribed by my care team to help me function or work and manage my condition(s).     Are strong medicines and can cause serious side effects such as:    Drowsiness, which can seriously affect my driving ability    A lower breathing rate, enough to cause death    Harm to my thinking ability     Depression     Abuse of and addiction to this medicine    Need to be taken exactly as prescribed. Combining opioids with certain medicines or chemicals (such as illegal drugs, sedatives, sleeping pills, and benzodiazepines) can be dangerous or even fatal. If I stop opioids suddenly, I may have severe withdrawal symptoms.    Do not work for all types of pain nor for all patients. If they re not helpful, I may  be asked to stop them.    {Benzo / Stimulant (Optional):381529}    The risks, benefits and side effects of these medicine(s) were explained to me. I agree that:  1. I will take part in other treatments as advised by my care team. This may be psychiatry or counseling, physical therapy, behavioral therapy, group treatment or a referral to a specialist.     2. I will keep all my appointments. I understand that this is part of the monitoring of opioids. My care team may require an office visit for EVERY opioid/controlled substance refill. If I miss appointments or don t follow instructions, my care team may stop my medicine.    3. I will take my medicines as prescribed. I will not change the dose or schedule unless my care team tells me to. There will be no refills if I run out early.     4. I may be asked to come to the clinic and complete a urine drug test or complete a pill count at any time. If I don t give a urine sample or participate in a pill count, the care team may stop my medicine.    5. I will only receive prescriptions from this clinic for chronic pain. If I am treated by another provider for acute pain issues, I will tell them that I am taking opioid pain medication for chronic pain and that I have a treatment agreement with this provider. I will inform my [facility] care team within one business day if I am given a prescription for any pain medication by another healthcare provider. My [facility] care team can contact other providers and pharmacists about my use of any medicines.    6. It is up to me to make sure that I don t run out of my medicines on weekends or holidays. If my care team is willing to refill my opioid prescription without a visit, I must request refills only during office hours. Refills may take up to 3 business days to process. I will use one pharmacy to fill all my opioid and other controlled substance prescriptions. I will notify the clinic about any changes to my  insurance or medication availability.    7. I am responsible for my prescriptions. If the medicine/prescription is lost, stolen or destroyed, it will not be replaced. I also agree not to share controlled substance medicines with anyone.    8. I am aware I should not use any illegal or recreational drugs. I agree not to drink alcohol unless my care team says I can.       9. If I enroll in the [place] [facility] program, I will tell my care team prior to my next refill.     10. I will tell my care team right away if I become pregnant, have a new medical problem treated outside of my regular clinic, or have a change in my medications.    11. I understand that this medicine can affect my thinking, judgment and reaction time. Alcohol and drugs affect the brain and body, which can affect the safety of my driving. Being under the influence of alcohol or drugs can affect my decision-making, behaviors, personal safety, and the safety of others. Driving while impaired (DWI) can occur if a person is driving, operating, or in physical control of a car, motorcycle, boat, snowmobile, ATV, motorbike, off-road vehicle, or any other motor vehicle ([place] Statute 169A.20). I understand the risk if I choose to drive or operate any vehicle or machinery.    I understand that if I do not follow any of the conditions above, my prescriptions or treatment may be stopped or changed.          Opioids  What You Need to Know    What are opioids?   Opioids are pain medicines that must be prescribed by a doctor. They are also known as narcotics.     Examples are:   1. morphine (MS Contin, [place])  2. oxycodone (Oxycontin)  3. oxycodone and acetaminophen (Percocet)  4. hydrocodone and acetaminophen (Vicodin, Norco)   5. fentanyl patch (Duragesic)   6. hydromorphone (Dilaudid)   7. methadone  8. codeine (Tylenol #3)     What do opioids do well?   Opioids are best for severe short-term pain such as after a surgery or injury. They may work well  for cancer pain. They may help some people with long-lasting (chronic) pain.     What do opioids NOT do well?   Opioids never get rid of pain entirely, and they don t work well for most patients with chronic pain. Opioids don t reduce swelling, one of the causes of pain.                                    Other ways to manage chronic pain and improve function include:       Treat the health problem that may be causing pain    Anti-inflammation medicines, which reduce swelling and tenderness, such as ibuprofen (Advil, Motrin) or naproxen (Aleve)    Acetaminophen (Tylenol)    Antidepressants and anti-seizure medicines, especially for nerve pain    Topical treatments such as patches or creams    Injections or nerve blocks    Chiropractic or osteopathic treatment    Acupuncture, massage, deep breathing, meditation, visual imagery, aromatherapy    Use heat or ice at the pain site    Physical therapy     Exercise    Stop smoking    Take part in therapy       Risks and side effects     Talk to your doctor before you start or decide to keep taking opioids. Possible side effects include:      Lowering your breathing rate enough to cause death    Overdose, including death, especially if taking higher than prescribed doses    Worse depression symptoms; less pleasure in things you usually enjoy    Feeling tired or sluggish    Slower thoughts or cloudy thinking    Being more sensitive to pain over time; pain is harder to control    Trouble sleeping or restless sleep    Changes in hormone levels (for example, less testosterone)    Changes in sex drive or ability to have sex    Constipation    Unsafe driving    Itching and sweating    Dizziness    Nausea, throwing up and dry mouth    What else should I know about opioids?    Opioids may lead to dependence, tolerance, or addiction.      Dependence means that if you stop or reduce the medicine too quickly, you will have withdrawal symptoms. These include loose poop (diarrhea),  jitters, flu-like symptoms, nervousness and tremors. Dependence is not the same as addiction.                       Tolerance means needing higher doses over time to get the same effect. This may increase the chance of serious side effects.      Addiction is when people improperly use a substance that harms their body, their mind or their relations with others. Use of opiates can cause a relapse of addiction if you have a history of drug or alcohol abuse.      People who have used opioids for a long time may have a lower quality of life, worse depression, higher levels of pain and more visits to doctors.    You can overdose on opioids. Take these steps to lower your risk of overdose:    1. Recognize the signs:  Signs of overdose include decrease or loss of consciousness (blackout), slowed breathing, trouble waking up and blue lips. If someone is worried about overdose, they should call 911.    2. Talk to your doctor about Narcan (naloxone).   If you are at risk for overdose, you may be given a prescription for Narcan. This medicine very quickly reverses the effects of opioids.   If you overdose, a friend or family member can give you Narcan while waiting for the ambulance. They need to know the signs of overdose and how to give Narcan.     3. Don't use alcohol or street drugs.   Taking them with opioids can cause death.    4. Do not take any of these medicines unless your doctor says it s OK. Taking these with opioids can cause death:    Benzodiazepines, such as lorazepam (Ativan), alprazolam (Xanax) or diazepam (Valium)    Muscle relaxers, such as cyclobenzaprine (Flexeril)    Sleeping pills like zolpidem (Ambien)     Other opioids      How to keep you and other people safe while taking opioids:    1. Never share your opioids with others.  Opioid medicines are regulated by the Drug Enforcement Agency ([employer]AS[employer]). Selling or sharing medications is a criminal act.    2. Be sure to store opioids in a secure place, locked up  if possible. Young children can easily swallow them and overdose.    3. When you are traveling with your medicines, keep them in the original bottles. If you use a pill box, be sure you also carry a copy of your medicine list from your clinic or pharmacy.    4. Safe disposal of opioids    Most pharmacies have places to get rid of medicine, called disposal kiosks. Medicine disposal options are also available in every [facility]. Search your county and  medication disposal  to find more options. You can find more details at:  https://www.pca.[facility].[email or web address]./living-green/managing-unwanted-medications     I agree that my provider, clinic care team, and pharmacy may work with any city, state or federal law enforcement agency that investigates the misuse, sale, or other diversion of my controlled medicine. I will allow my provider to discuss my care with, or share a copy of, this agreement with any other treating provider, pharmacy or emergency room where I receive care.    I have read this agreement and have asked questions about anything I did not understand.    _______________________________________________________  Patient Signature - Sandy Spencer _____________________                   Date     _______________________________________________________  Provider Signature - ARELIS FITZPATRICK MD   _____________________                   Date     _______________________________________________________  Witness Signature (required if provider not present while patient signing)   _____________________                   Date

## 2023-04-21 NOTE — TELEPHONE ENCOUNTER
Call placed to patient: pre procedure phone call  Spoke with patient and she agrees to hold the eloquis x 3 day pre procedure.  Denies taking any steroid medication  Denies any recent vaccinations  Denies taking any antibiotic medications  She is aware of and will have a     Order has been placed for this procedure

## 2023-04-22 NOTE — PROGRESS NOTES
[facility] - Office Visit    ASSESSMENT & PLAN     Sandy was seen today for pain management.    Diagnoses and all orders for this visit:    Lumbar radiculopathy  -     lidocaine (LIDODERM) 5 % patch; Place 1 patch onto the skin every 24 hours To prevent lidocaine toxicity, patient should be patch free for 12 hrs daily.  -     traMADol (ULTRAM) 50 MG tablet; One to two tabs three times a day as needed        Patient Instructions     [facility] Number:  [phone]    Call with any questions about your care and for scheduling assistance.     Calls are returned Monday through Friday between 8 AM and 4:30 PM. We usually get back to you within 2 business days depending on the issue/request.    If we are prescribing your medications:    For opioid medication refills, call the clinic or send a [employer] message 7 days in advance.  Please include:    Name of requested medication    Name of the pharmacy.    For non-opioid medications, call your pharmacy directly to request a refill. Please allow 3-4 days to be processed.     Per [place] State Law:    All controlled substance prescriptions must be filled within 30 days of being written.      For those controlled substances allowing refills, pickup must occur within 30 days of last fill.      We believe regular attendance is key to your success in our program!      Any time you are unable to keep your appointment we ask that you call us at least 24 hours in advance to cancel.This will allow us to offer the appointment time to another patient.     Multiple missed appointments may lead to dismissal from the clinic.     PLAN:    You are scheduled for/27 for a caudal injection with Dr. Gil that should help your back pain.    Lidocaine patches, may apply 1 patch daily to your low back.    Tramadol to help with back pain until your injection 50 mg tablets 1 to 2 tablets 3 times a day as needed.    Follow-up with   "Shane for an appointment in 2 months        -----  ARELIS FITZPATRICK MD  [facility]       SUBJECTIVE      Sandy Spencer is a 82 year old year old female who presents to clinic today for the following:     Seen for overlapping pain syndromes including lumbar degenerative changes, cervical radiculopathy, migraine headaches.    Reviewing the record 3/8 saw Dr. Gil for evaluation, reviewing her pain concerns with cervical spondylolysis, bilateral occipital neuralgia, cervical radiculopathy, right rotator cuff tears and a left L5 radiculopathy, discussed a plan to begin with trigger points occipital nerve blocks, 1 month later cervical steroid, can consider a suprascapular nerve block and a caudal ANTONY.    She reviews today lasted week she was vacuuming, something \"popped\".  Since then she spent much of the days in bed.  Pain has been in her right hip, down side to her right knee burning.  Size down to the right calf.  Usually the problem and then left-sided as above.    Tylenol does not help.    She also had seen neurology previous to that, told the PA she has been hearing a \"whooshing\" sound in her head and as such had an MRA.  There were no aneurysms found but notes a strong family history of aneurysms including abdominal aneurysms.    Reviews when she fell 2 years ago in March landing on her right knee and hitting her chin breaking her glasses having bruising on her face and a black eye there is been challenges.  She describes sometimes of the leg \"achy feeling\" like ice water thrown on her in the past when she has had RSD, though it not noticing presently color changes, swelling.    Blood pressure is relatively better.    She is off opioids and glad to be off opioids though she describes struggling with the pain and having nihilistic thoughts.    We did use ketamine at 1 time, states the daughters took it away from her telling her that is an animal tranquilizer, states they did not really " see her with any changes.  She cannot afford medical cannabis.    She has no longer taking Effexor.  She had been on tramadol in the distant past and we discussed using that for the short-term.    She had already scheduled on 4/27 with Dr. Gil for a caudal injection and we discussed this would likely help with some of her radicular symptoms which are the main component.    She would also like to use the Lidoderm patches.  Urine drug test in May  reviewed  No bowel or bladder changes      Current Outpatient Medications:      albuterol (PROAIR HFA/PROVENTIL HFA/VENTOLIN HFA) 108 (90 Base) MCG/ACT inhaler, Inhale 2 puffs into the lungs every 6 hours, Disp: 18 g, Rfl: 4     allopurinol (ZYLOPRIM) 100 MG tablet, Take 200 mg by mouth daily, Disp: , Rfl:      apixaban ANTICOAGULANT (ELIQUIS) 5 MG tablet, Take 1 tablet (5 mg) by mouth 2 times daily, Disp: 180 tablet, Rfl: 3     apixaban ANTICOAGULANT (ELIQUIS) 5 MG tablet, Take 1 tablet (5 mg) by mouth 2 times daily, Disp: 180 tablet, Rfl: 3     azelastine (ASTELIN) 0.1 % nasal spray, 2 sprays each nostril 1-2x daily as needed for nasal congestion (use nightly for first 2 week), Disp: 30 mL, Rfl: 11     Cholecalciferol (VITAMIN D-3) 125 MCG (5000 UT) TABS, Take 5,000 Units by mouth daily, Disp: , Rfl:      ipratropium - albuterol 0.5 mg/2.5 mg/3 mL (DUONEB) 0.5-2.5 (3) MG/3ML neb solution, Take 1 vial by nebulization every 6 hours as needed for shortness of breath, wheezing or cough, Disp: , Rfl:      levETIRAcetam (KEPPRA) 250 MG tablet, Take 1 tablet (250 mg) by mouth 4 times daily, Disp: 360 tablet, Rfl: 1     levothyroxine (SYNTHROID/LEVOTHROID) 50 MCG tablet, Take 1 tablet (50 mcg) by mouth daily, Disp: 90 tablet, Rfl: 3     lidocaine (LIDODERM) 5 % patch, Place 1 patch onto the skin every 24 hours To prevent lidocaine toxicity, patient should be patch free for 12 hrs daily., Disp: 30 patch, Rfl: 1     rosuvastatin (CRESTOR) 40 MG tablet, Take 1 tablet (40 mg) by  "mouth daily, Disp: 90 tablet, Rfl: 3     torsemide (DEMADEX) 10 MG tablet, Take 1 tablet (10 mg) by mouth daily, Disp: 90 tablet, Rfl: 3     traMADol (ULTRAM) 50 MG tablet, One to two tabs three times a day as needed, Disp: 60 tablet, Rfl: 1     traZODone (DESYREL) 100 MG tablet, Take 4 tablets (400 mg) by mouth At Bedtime, Disp: 360 tablet, Rfl: 1     zonisamide (ZONEGRAN) 25 MG capsule, Take 2 capsules (50 mg) by mouth At Bedtime, Disp: 60 capsule, Rfl: 3    Current Facility-Administered Medications:      denosumab (PROLIA) injection 60 mg, 60 mg, Subcutaneous, Q6 Months, Caroline Sumner NP, 60 mg at 01/25/23 1359        Review of Systems   General, psych, musculoskeletal, bowels and bladder otherwise normal other than above.          OBJECTIVE   /75   Pulse 90   Wt 66.2 kg (146 lb)   LMP  (LMP Unknown)   SpO2 95%   BMI 25.86 kg/m        Physical Exam  General: Alert, clear sensorium.  Appropriately groomed.  No diaphoresis.  Ambulates with cane  Cardiovascular: Normal rate  Lungs: Pulmonary effort is normal, speaking in full sentences  MSK: On exam markedly tender over the lumbar sacral area, jumps.  Negative straight leg raising.  Patellar reflexes equal.  Good ankle plantar dorsiflexion.  Skin: Warm and dry. No concerning rashes or lesions.  Neurologic: No focal deficit, alert and oriented x3  Psychiatric: normal mood and affect, cooperative      Assessment: Patient with known lumbar radiculopathy describes a \"pop\" with vacuuming, and now more radicular symptoms down the right side.    She very much wants to stay away from opioids, had worked hard to come off the fentanyl patch.    She is scheduled next week for a caudal block which may be helpful.    We discussed the lidocaine patches which she has used in the past would like to use again and will use tramadol for the short-term.    Total time 35 minutes    "

## 2023-04-25 ENCOUNTER — TELEPHONE (OUTPATIENT)
Dept: PALLIATIVE MEDICINE | Facility: OTHER | Age: 83
End: 2023-04-25

## 2023-04-25 NOTE — TELEPHONE ENCOUNTER
Prior Authorization Retail Medication Request    Medication/Dose: lidocaine (LIDODERM) 5 % patch are non-formulary  ICD code (if different than what is on RX):  Lumbar radiculopathy [M54.16  Previously Tried and Failed:    Rationale:  Lumbar radiculopathy [M54.16    Insurance Name:    Coverage information:     Subscriber: [id number] TACO JERRY     Rel to sub: 01 - Self     Member ID: [id number]     Payor: [employer] Ph: [phone]     Benefit plan: 950-MEDICARE Ph: [phone]     Group number: Not given     Member effective dates: from 08/01/00        Insurance ID:        Pharmacy Information (if different than what is on RX)  Name:  [facility], [place] [address] [place] [address] [facility]  Phone:

## 2023-04-27 ENCOUNTER — RADIOLOGY INJECTION OFFICE VISIT (OUTPATIENT)
Dept: PALLIATIVE MEDICINE | Facility: OTHER | Age: 83
End: 2023-04-27
Payer: MEDICARE

## 2023-04-27 VITALS — SYSTOLIC BLOOD PRESSURE: 132 MMHG | HEART RATE: 104 BPM | DIASTOLIC BLOOD PRESSURE: 76 MMHG | OXYGEN SATURATION: 94 %

## 2023-04-27 DIAGNOSIS — M54.16 LUMBAR RADICULOPATHY: ICD-10-CM

## 2023-04-27 DIAGNOSIS — G89.4 CHRONIC PAIN SYNDROME: ICD-10-CM

## 2023-04-27 PROCEDURE — 62323 NJX INTERLAMINAR LMBR/SAC: CPT | Performed by: STUDENT IN AN ORGANIZED HEALTH CARE EDUCATION/TRAINING PROGRAM

## 2023-04-27 PROCEDURE — 250N000011 HC RX IP 250 OP 636: Performed by: STUDENT IN AN ORGANIZED HEALTH CARE EDUCATION/TRAINING PROGRAM

## 2023-04-27 PROCEDURE — 250N000009 HC RX 250: Performed by: STUDENT IN AN ORGANIZED HEALTH CARE EDUCATION/TRAINING PROGRAM

## 2023-04-27 PROCEDURE — 255N000002 HC RX 255 OP 636: Performed by: STUDENT IN AN ORGANIZED HEALTH CARE EDUCATION/TRAINING PROGRAM

## 2023-04-27 RX ORDER — METHYLPREDNISOLONE ACETATE 40 MG/ML
40 INJECTION, SUSPENSION INTRA-ARTICULAR; INTRALESIONAL; INTRAMUSCULAR; SOFT TISSUE ONCE
Status: COMPLETED | OUTPATIENT
Start: 2023-04-27 | End: 2023-04-27

## 2023-04-27 RX ORDER — LIDOCAINE HYDROCHLORIDE 10 MG/ML
2 INJECTION, SOLUTION EPIDURAL; INFILTRATION; INTRACAUDAL; PERINEURAL ONCE
Status: COMPLETED | OUTPATIENT
Start: 2023-04-27 | End: 2023-04-27

## 2023-04-27 RX ADMIN — METHYLPREDNISOLONE ACETATE 40 MG: 40 INJECTION, SUSPENSION INTRA-ARTICULAR; INTRALESIONAL; INTRAMUSCULAR; INTRASYNOVIAL; SOFT TISSUE at 11:23

## 2023-04-27 RX ADMIN — IOHEXOL 10 ML: 300 INJECTION, SOLUTION INTRAVENOUS at 11:22

## 2023-04-27 RX ADMIN — LIDOCAINE HYDROCHLORIDE 2 ML: 10 INJECTION, SOLUTION EPIDURAL; INFILTRATION; INTRACAUDAL; PERINEURAL at 11:23

## 2023-04-27 ASSESSMENT — PAIN SCALES - GENERAL: PAINLEVEL: MILD PAIN (2)

## 2023-04-27 NOTE — PROGRESS NOTES
Pre-procedure Intake  If YES to any questions or NO to having a   Please complete laminated checklist and leave on the computer keyboard for Provider, verbally inform provider if able.    For SCS Trial, RFA's or any sedation procedure:  Have you been fasting? NA    If yes, for how long? na    Are you taking any any blood thinners such as Coumadin, Warfarin, Jantoven, Pradaxa Xarelto, Eliquis, Edoxaban, Enoxaparin, Lovenox, Heparin, Arixtra, Fondaparinux, or Fragmin? OR Antiplatelet medication such as Plavix, Brilinta, or Effient?   Yes -   Other blood thinners     If yes, when did you take your last dose? 4days ago, eliquis    Do you take aspirin?  No    If cervical procedure, have you held aspirin for 6 days?   NA    Do you have any allergies to contrast dye, iodine, steroid and/or numbing medications?  NO    Are you currently taking antibiotics or have an active infection?  NO    Have you had a fever/elevated temperature within the past week? NO    Are you currently taking oral steroids? NO    Do you have a ? Yes    Are you pregnant or breastfeeding?  Not Applicable    Have you received the COVID-19 vaccine? No na    If yes, was it your 1st, 2nd or only dose needed? na    Date of most recent vaccine: na    Notify provider and RNs if systolic BP >170, diastolic BP >100, P >100 or O2 sats < 90%

## 2023-04-27 NOTE — PATIENT INSTRUCTIONS
[facility]   Procedure Discharge Instructions    Today you saw:    Dr. Marcano,   Dr. Stanislaw Gil    You had an:  Epidural steroid injection   sacroiliac joint injection   facet joint injection  hip injection  piriformis injection     Medications used:  Lidocaine   Bupivacaine Methylprednisolone Omnipaque   Normal saline             If you were holding your blood thinning medication, please restart taking it: N/A  Be cautious when walking. Numbness and/or weakness in the lower extremities may occur for up to 6-8 hours after the procedure due to effect of the local anesthetic  Do not drive for 6 hours. The effect of the local anesthetic could slow your reflexes.   You may resume your regular activities after 24 hours  Avoid strenuous activity for the first 24 hours  You may shower, however avoid swimming, tub baths or hot tubs for 24 hours following your procedure  You may have a mild to moderate increase in pain for several days following the injection.  It may take up to 14 days for the steroid medication to start working although you may feel the effect as early as a few days after the procedure.     You may use ice packs for 10-15 minutes, 3 to 4 times a day at the injection site for comfort  Do not use heat to painful areas for 6 to 8 hours. This will give the local anesthetic time to wear off and prevent you from accidentally burning your skin.   Unless you have been directed to avoid the use of anti-inflammatory medications (NSAIDS), you may use medications such as ibuprofen, Aleve or Tylenol for pain control if needed.   If you were fasting, you may resume your normal diet and medications after the procedure  If you have diabetes, check your blood sugar more frequently than usual as your blood sugar may be higher than normal for 10-14 days following a steroid injection. Contact your doctor who manages your diabetes if your blood sugar is higher than  usual  Possible side effects of steroids that you may experience include flushing, elevated blood pressure, increased appetite, mild headaches and restlessness.  All of these symptoms will get better with time.  If you experience any of the following, call the Pain Clinic at [phone]:  -Fever over 100 degree F  -Swelling, bleeding, redness, drainage, warmth at the injection site  -Progressive weakness or numbness in your legs or arms  -Loss of bowel or bladder function  -Unusual headache that is not relieved by Tylenol or other pain reliever  -Unusual new onset of pain that is not improving

## 2023-04-27 NOTE — TELEPHONE ENCOUNTER
Refill Request  Medication name: Pending Prescriptions:                       Disp   Refills    traZODone (DESYREL) 100 MG tablet         360 ta*1            Sig: Take 4 tablets (400 mg) by mouth At Bedtime    Requested Pharmacy:     [facility] [place] [address]

## 2023-04-27 NOTE — TELEPHONE ENCOUNTER
"Routing refill request to provider for review/approval because:  PCP to review dose    Last Written Prescription Date:  11/29/22  Last Fill Quantity: 360,  # refills: 1   Last office visit provider: 11/29/22    Requested Prescriptions   Pending Prescriptions Disp Refills     traZODone (DESYREL) 100 MG tablet 360 tablet 1     Sig: Take 4 tablets (400 mg) by mouth At Bedtime       Serotonin Modulators Passed - 4/27/2023  9:41 AM        Passed - Recent (12 mo) or future (30 days) visit within the authorizing provider's specialty     Patient has had an office visit with the authorizing provider or a provider within the authorizing providers department within the previous 12 mos or has a future within next 30 days. See \"Patient Info\" tab in inbasket, or \"Choose Columns\" in Meds & Orders section of the refill encounter.              Passed - Medication is active on med list        Passed - Patient is age 18 or older        Passed - No active pregnancy on record        Passed - No positive pregnancy test in past 12 months             Linda Herrera RN 04/27/23 4:02 PM  "

## 2023-04-27 NOTE — PROGRESS NOTES
Pre procedure Diagnosis: lumbar radiculopathy   Post procedure Diagnosis: Same  Procedure performed: Caudal epidural steroid injection  Anesthesia: Local  Complications: None  Operators: Ronnie Gil MD    Indications:   Sandy Spencer is a 82 year old female was sent by myself for caudal epidural steroid injection.     Options/alternatives, benefits and risks were discussed with the patient including bleeding, infection, tissue trauma, numbness, weakness, paralysis, spinal cord injury, radiation exposure, headache, reaction to medications including seizure, and effects on the bladder from local anesthetic.  Questions were answered to her satisfaction and she agrees to proceed. Voluntary informed consent was obtained and signed.     Vitals were reviewed: Yes  Allergies were reviewed:  Yes   Medications were reviewed:  Yes   Pre-procedure pain score: not assessed/10    Procedure:  After getting informed consent, patient was brought into the procedure suite and was placed in a prone position on the procedure table.   A Pause for Cause was performed.  Patient was prepped and draped in sterile fashion.     The sacral hiatus and cornua were palpated.  Under lateral fluoroscopic guidance sacral hiatus was identified.  4 ml of lidocaine 1% was then injected at the needle entry point to anesthetize the skin. Then a 22 gauge 5 inch quincke type spinal needle was inserted into sacral hiatus.  Once the needle was advanced through the sacral hiatus, the needle angle was decreased to allow entrance into the sacral canal and epidural space.  This was verified in both the AP and lateral view for correct alignment.   The needle was advanced using intermittent fluoroscopy.  After negative aspiration for heme and CSF, 1.5ml of Omnipaque 300 contrast dye was injected.  The contrast showed good epidural spread, and was verified in both the AP and lateral view.    The injection was then accomplished with 2ml of  1% Lidocaine MPF,  40mg methylprednisolone, and 4ml of preservative free saline. The needle was withdrawn, patient's back was cleaned, and they were brought to recovery area.      Hemostasis was achieved, the area was cleaned, and bandaids were placed when appropriate.  The patient tolerated the procedure well, and was taken to the recovery room.    Images were saved to PACS.    Post-procedure pain score: 2/10  Follow-up includes:   -f/u with referring provider    Ronnie Gil MD  Interventional Pain Medicine  [facility]

## 2023-04-28 RX ORDER — TRAZODONE HYDROCHLORIDE 100 MG/1
400 TABLET ORAL AT BEDTIME
Qty: 360 TABLET | Refills: 1 | Status: SHIPPED | OUTPATIENT
Start: 2023-04-28 | End: 2023-08-02

## 2023-04-28 NOTE — TELEPHONE ENCOUNTER
Central Prior Authorization Team  Phone: [phone]    [place] Initiation    Medication: lidocaine (LIDODERM) 5 % patch are non-formulary  Insurance Company: [employer] - Phone [phone] Fax [phone]  Pharmacy Filling the Rx:    Filling Pharmacy Phone:    Filling Pharmacy Fax:    Start Date: 4/28/2023

## 2023-05-02 NOTE — TELEPHONE ENCOUNTER
Central Prior Authorization Team  Phone: [phone]    PRIOR AUTHORIZATION DENIED    Medication: lidocaine (LIDODERM) 5 % patch are non-formulary    Denial Date: 5/2/2023    Denial Rational:         Appeal Information:

## 2023-05-08 ENCOUNTER — OFFICE VISIT (OUTPATIENT)
Dept: FAMILY MEDICINE | Facility: CLINIC | Age: 83
End: 2023-05-08
Payer: MEDICARE

## 2023-05-08 ENCOUNTER — HOSPITAL ENCOUNTER (OUTPATIENT)
Dept: GENERAL RADIOLOGY | Facility: HOSPITAL | Age: 83
Discharge: HOME | End: 2023-05-08
Attending: NURSE PRACTITIONER | Admitting: NURSE PRACTITIONER
Payer: MEDICARE

## 2023-05-08 VITALS
RESPIRATION RATE: 18 BRPM | WEIGHT: 147 LBS | BODY MASS INDEX: 26.04 KG/M2 | HEART RATE: 98 BPM | DIASTOLIC BLOOD PRESSURE: 73 MMHG | OXYGEN SATURATION: 96 % | TEMPERATURE: 99.8 F | SYSTOLIC BLOOD PRESSURE: 121 MMHG

## 2023-05-08 DIAGNOSIS — G89.29 CHRONIC BILATERAL LOW BACK PAIN WITH RIGHT-SIDED SCIATICA: ICD-10-CM

## 2023-05-08 DIAGNOSIS — G89.4 CHRONIC PAIN SYNDROME: Primary | ICD-10-CM

## 2023-05-08 DIAGNOSIS — G89.29 CHRONIC NECK PAIN: ICD-10-CM

## 2023-05-08 DIAGNOSIS — M25.551 HIP PAIN, RIGHT: ICD-10-CM

## 2023-05-08 DIAGNOSIS — M54.2 CHRONIC NECK PAIN: ICD-10-CM

## 2023-05-08 DIAGNOSIS — M54.41 CHRONIC BILATERAL LOW BACK PAIN WITH RIGHT-SIDED SCIATICA: ICD-10-CM

## 2023-05-08 PROCEDURE — 73502 X-RAY EXAM HIP UNI 2-3 VIEWS: CPT

## 2023-05-08 PROCEDURE — 99214 OFFICE O/P EST MOD 30 MIN: CPT | Performed by: NURSE PRACTITIONER

## 2023-05-08 NOTE — PROGRESS NOTES
Assessment & Plan     Hip pain, right    - XR Hip Right 2-3 Views    Chronic pain syndrome      Chronic bilateral low back pain with right-sided sciatica      Chronic neck pain       Patient with multiple pain complaints today.  Did try to keep patient on track related to what brought her in today to urgent care with discussion that we do not aggressively treat chronic pain here.  Patient has not wanted to use her tramadol prescribed by the pain clinic.  Patient is having more severe neck pain for 1 week and the right hip and groin area pain.  No recent falls.  No weakness in the upper extremities.     She does see a pain clinic.  She lays in an adjustable bed most of the day.     She is tender in the right groin and lateral hip area with history of sciatica down bilateral legs and recent spinal injection.    X-ray of the right hip shows moderate arthritis without fracture of the hip or pelvis.    Exquisitely tender over the trapezius muscles.  No recent neck trauma.  Do not believe she needs any imaging from the urgent care or an emergency MRI elsewhere.    Can follow-up with PCP or her [facility] neurology clinic for neck pain exacerbation.    Prescriptions/opiate pain medication per your pain clinic only.    Can try the 4% Salonpas patches available over-the-counter over your neck or back as needed.    Tylenol 1000 mg 3 times daily scheduled.  30 minutes spent by me on the date of the encounter doing chart review, review of test results, patient visit and documentation         No follow-ups on file.    Romana Fernandez, [facility] MICHELE Delgado is a 82 year old female who presents to clinic today for the following health issues:  Chief Complaint   Patient presents with     Back Pain     Fall x November. Can not turn head. Back pain. Lt leg pain. Nausea x 2-3 day.     HPI    Patient who sees a pain management clinic for lumbar radiculopathy, last in person visit on April 21, with  "recent spinal injection on the 27th.    Called pain clinic on May 3 and was advised to wait for about 2 weeks for the effects of the injection.     Has had many falls, last in Nov.  Has right hip pain and sciatica.  No right hip imaging.    Having difficulty moving head.  Neck pain really bad for 1 week. No radiating pain.  Had RSD in rt arm.  No weakness except for brief episodes. [facility] - last saw 6 weeks ago - multi injections in shoulder, botox, last about a year ago.  Has rt rotator cuff tear. They tried to get her in 3 days ago, but she couldn't get a ride.       Has had radiofrequency ablation in the neck in a nerve.      Is worried about underlying fracture.    \"Has been in bed for 8 weeks.\"  Lying in adjustable bed.  Reads books.  Has a lift chair.  Uses heating pads and ice packs.  Can get out of bed if she needs to for things like bathroom trips, going to [employer].    Has tramadol.  Had previously been on Fentanyl x 3 years.  Does not take tramadol because she says it does not work.  Did try Tylenol prior to arrival.    Has single kidney with CKD.    Does use a wheeled walker.      Review of Systems  Numerous other symptoms including those related to RSD      Objective    /73   Pulse 98   Temp 99.8  F (37.7  C) (Tympanic)   Resp 18   Wt 66.7 kg (147 lb)   LMP  (LMP Unknown)   SpO2 96%   BMI 26.04 kg/m    Physical Exam  Cardiovascular:      Rate and Rhythm: Normal rate.   Pulmonary:      Effort: Pulmonary effort is normal.   Musculoskeletal:         General: Tenderness (Tender to minimal touch left upper trapezius muscle, left neck..  Palpable spasms on the right neck area.) present.      Comments: Tender right groin, right lateral hip, right low back.  Did not tolerate much of an exam today.   Neurological:      Mental Status: She is alert.      Gait: Gait abnormal (Shuffling, unsteady appearing gait.).   Psychiatric:         Mood and Affect: Mood normal.      I " independently visualized the xray:   Moderately severe arthritis.  No fracture.     Results for orders placed or performed during the hospital encounter of 05/08/23   XR Hip Right 2-3 Views     Status: None    Narrative    EXAM: XR HIP RIGHT 2-3 VIEWS  LOCATION: [facility]  DATE/TIME: 5/8/2023 4:40 PM CDT    INDICATION: Chronic pain, right hip.  COMPARISON: None.      Impression    IMPRESSION: No fracture. Moderate degenerative changes in the right hip. Postop changes in the right upper pelvis. Osteopenia.

## 2023-05-08 NOTE — PATIENT INSTRUCTIONS
Right hip and pelvis x-ray -no broken bones.  There is moderate arthritis in this area.    Please call the [facility] clinic again to discuss your neck pain and upper trapezius pain.  If they cannot get you in, your primary care provider can order advanced imaging such as MRIs.  We do not do this in the walk-in clinic.  I would send a [employer] message or call ahead to see if they would be willing to do this.    Follow-up with pain clinic for chronic sciatica and back pain with recent spinal injection.    Prescriptions/opiate pain medication per your pain clinic only.    You can try the 4% Salonpas patches available over-the-counter over your neck or back as needed.    Tylenol 1000 mg 3 times daily scheduled.

## 2023-05-10 ENCOUNTER — NURSE TRIAGE (OUTPATIENT)
Dept: FAMILY MEDICINE | Facility: CLINIC | Age: 83
End: 2023-05-10
Payer: MEDICARE

## 2023-05-10 NOTE — TELEPHONE ENCOUNTER
"Nurse Triage SBAR    Is this a 2nd Level Triage? YES, LICENSED PRACTITIONER REVIEW IS REQUIRED    Situation:   Severe neck and back pain.  Rating 9 or 10/10 - has hardly gotten out of bed for 2 months. Cannot get out of bed now due to pain.     Background:   Went to urgent care on Monday night due to pain and they did not do x-ray. Was sent home.     Assessment:   Having severe neck pain.  Had back injections done 2 weeks ago. Does have some occasional numbness in left fingers.  Had fever of 99.7 at urgent care and does feel like has a fever. Having a migraine as well. Pain is in back but primarily in neck.    Protocol Recommended Disposition:   Call  Now.  Patient agreed with disposition and is going to call 911.    Recommendation:   Per protocol recommended to call 911 to go to ED. Patient states she plans to call 911 and will  go to [facility].     Routed to provider    Does the patient meet one of the following criteria for ADS visit consideration? 16+ years old, with an [facility] PCP     TIP  Providers, please consider if this condition is appropriate for management at one of our Acute and Diagnostic Services sites.     If patient is a good candidate, please use dotphrase <dot>triageresponse and select Refer to ADS to document.              Reason for Disposition    Sounds like a life-threatening emergency to the triager    Additional Information    Negative: Shock suspected (e.g., cold/pale/clammy skin, too weak to stand, low BP, rapid pulse)    Negative: Similar pain previously and it was from 'heart attack'    Negative: Similar pain previously from 'angina' and not relieved by nitroglycerin    Negative: Difficult to awaken or acting confused (e.g., disoriented, slurred speech)    Answer Assessment - Initial Assessment Questions  1. ONSET: \"When did the pain begin?\"       2 months ago back went out  2. LOCATION: \"Where does it hurt?\"       Back and neck and migraine  3. PATTERN \"Does the pain come and go, " "or has it been constant since it started?\"       Constant  4. SEVERITY: \"How bad is the pain?\"  (Scale 1-10; or mild, moderate, severe)    - NO PAIN (0): no pain or only slight stiffness     - MILD (1-3): doesn't interfere with normal activities     - MODERATE (4-7): interferes with normal activities or awakens from sleep     - SEVERE (8-10):  excruciating pain, unable to do any normal activities       10  5. RADIATION: \"Does the pain go anywhere else, shoot into your arms?\"      Migraine  6. CORD SYMPTOMS: \"Any weakness or numbness of the arms or legs?\"      Occasional left fingertips numbness  7. CAUSE: \"What do you think is causing the neck pain?\"      Back - stated at the pain center -   8. NECK OVERUSE: \"Any recent activities that involved turning or twisting the neck?\"      No - felt it pop when vacuuming.  9. OTHER SYMPTOMS: \"Do you have any other symptoms?\" (e.g., headache, fever, chest pain, difficulty breathing, neck swelling)      Migraine headache, 99.7 at urgent care on Monday night, feels like it may be swollen - pain is horrible and can't get out of bed.  10. PREGNANCY: \"Is there any chance you are pregnant?\" \"When was your last menstrual period?\"        n/a    Protocols used: NECK PAIN OR [id number]      "

## 2023-05-12 ENCOUNTER — APPOINTMENT (OUTPATIENT)
Dept: RADIOLOGY | Facility: HOSPITAL | Age: 83
End: 2023-05-12
Attending: EMERGENCY MEDICINE
Payer: MEDICARE

## 2023-05-12 ENCOUNTER — HOSPITAL ENCOUNTER (EMERGENCY)
Facility: HOSPITAL | Age: 83
Discharge: HOME | End: 2023-05-13
Attending: EMERGENCY MEDICINE | Admitting: EMERGENCY MEDICINE
Payer: MEDICARE

## 2023-05-12 ENCOUNTER — NURSE TRIAGE (OUTPATIENT)
Dept: NURSING | Facility: CLINIC | Age: 83
End: 2023-05-12
Payer: MEDICARE

## 2023-05-12 ENCOUNTER — APPOINTMENT (OUTPATIENT)
Dept: MRI IMAGING | Facility: HOSPITAL | Age: 83
End: 2023-05-12
Attending: EMERGENCY MEDICINE
Payer: MEDICARE

## 2023-05-12 DIAGNOSIS — M54.2 NECK PAIN: ICD-10-CM

## 2023-05-12 LAB
ANION GAP SERPL CALCULATED.3IONS-SCNC: 12 MMOL/L (ref 7–15)
BASOPHILS # BLD AUTO: 0.1 10E3/UL (ref 0–0.2)
BASOPHILS NFR BLD AUTO: 1 %
BUN SERPL-MCNC: 24.4 MG/DL (ref 8–23)
CALCIUM SERPL-MCNC: 8.8 MG/DL (ref 8.8–10.2)
CHLORIDE SERPL-SCNC: 97 MMOL/L (ref 98–107)
CREAT SERPL-MCNC: 1.58 MG/DL (ref 0.51–0.95)
DEPRECATED HCO3 PLAS-SCNC: 26 MMOL/L (ref 22–29)
EOSINOPHIL # BLD AUTO: 0.1 10E3/UL (ref 0–0.7)
EOSINOPHIL NFR BLD AUTO: 3 %
ERYTHROCYTE [DISTWIDTH] IN BLOOD BY AUTOMATED COUNT: 13.6 % (ref 10–15)
GFR SERPL CREATININE-BSD FRML MDRD: 33 ML/MIN/1.73M2
GLUCOSE SERPL-MCNC: 85 MG/DL (ref 70–99)
HCT VFR BLD AUTO: 33.4 % (ref 35–47)
HGB BLD-MCNC: 10.6 G/DL (ref 11.7–15.7)
HOLD SPECIMEN: NORMAL
IMM GRANULOCYTES # BLD: 0 10E3/UL
IMM GRANULOCYTES NFR BLD: 0 %
LYMPHOCYTES # BLD AUTO: 1.7 10E3/UL (ref 0.8–5.3)
LYMPHOCYTES NFR BLD AUTO: 35 %
MCH RBC QN AUTO: 30.7 PG (ref 26.5–33)
MCHC RBC AUTO-ENTMCNC: 31.7 G/DL (ref 31.5–36.5)
MCV RBC AUTO: 97 FL (ref 78–100)
MONOCYTES # BLD AUTO: 0.4 10E3/UL (ref 0–1.3)
MONOCYTES NFR BLD AUTO: 9 %
NEUTROPHILS # BLD AUTO: 2.5 10E3/UL (ref 1.6–8.3)
NEUTROPHILS NFR BLD AUTO: 52 %
NRBC # BLD AUTO: 0 10E3/UL
NRBC BLD AUTO-RTO: 0 /100
PLATELET # BLD AUTO: 199 10E3/UL (ref 150–450)
POTASSIUM SERPL-SCNC: 3.2 MMOL/L (ref 3.4–5.3)
RBC # BLD AUTO: 3.45 10E6/UL (ref 3.8–5.2)
SODIUM SERPL-SCNC: 135 MMOL/L (ref 136–145)
TROPONIN T SERPL HS-MCNC: 15 NG/L
WBC # BLD AUTO: 4.8 10E3/UL (ref 4–11)

## 2023-05-12 PROCEDURE — 85025 COMPLETE CBC W/AUTO DIFF WBC: CPT | Performed by: EMERGENCY MEDICINE

## 2023-05-12 PROCEDURE — 93005 ELECTROCARDIOGRAM TRACING: CPT | Performed by: EMERGENCY MEDICINE

## 2023-05-12 PROCEDURE — 71045 X-RAY EXAM CHEST 1 VIEW: CPT

## 2023-05-12 PROCEDURE — 99285 EMERGENCY DEPT VISIT HI MDM: CPT | Mod: 25

## 2023-05-12 PROCEDURE — 80048 BASIC METABOLIC PNL TOTAL CA: CPT | Performed by: EMERGENCY MEDICINE

## 2023-05-12 PROCEDURE — 250N000013 HC RX MED GY IP 250 OP 250 PS 637: Performed by: EMERGENCY MEDICINE

## 2023-05-12 PROCEDURE — 36415 COLL VENOUS BLD VENIPUNCTURE: CPT | Performed by: EMERGENCY MEDICINE

## 2023-05-12 PROCEDURE — 250N000011 HC RX IP 250 OP 636: Performed by: EMERGENCY MEDICINE

## 2023-05-12 PROCEDURE — 84484 ASSAY OF TROPONIN QUANT: CPT | Performed by: EMERGENCY MEDICINE

## 2023-05-12 PROCEDURE — 72141 MRI NECK SPINE W/O DYE: CPT | Mod: MA

## 2023-05-12 PROCEDURE — 250N000009 HC RX 250: Performed by: EMERGENCY MEDICINE

## 2023-05-12 PROCEDURE — 96374 THER/PROPH/DIAG INJ IV PUSH: CPT

## 2023-05-12 RX ORDER — LIDOCAINE 4 G/G
1 PATCH TOPICAL
Status: DISCONTINUED | OUTPATIENT
Start: 2023-05-12 | End: 2023-05-13 | Stop reason: HOSPADM

## 2023-05-12 RX ORDER — LIDOCAINE 4 G/G
1 PATCH TOPICAL EVERY 24 HOURS
Qty: 15 PATCH | Refills: 0 | Status: SHIPPED | OUTPATIENT
Start: 2023-05-12 | End: 2023-08-04

## 2023-05-12 RX ORDER — ALBUTEROL SULFATE 0.83 MG/ML
2.5 SOLUTION RESPIRATORY (INHALATION) ONCE
Status: COMPLETED | OUTPATIENT
Start: 2023-05-12 | End: 2023-05-12

## 2023-05-12 RX ORDER — METHOCARBAMOL 500 MG/1
500 TABLET, FILM COATED ORAL 4 TIMES DAILY PRN
Qty: 28 TABLET | Refills: 0 | Status: SHIPPED | OUTPATIENT
Start: 2023-05-12 | End: 2023-06-01

## 2023-05-12 RX ADMIN — ALBUTEROL SULFATE 2.5 MG: 2.5 SOLUTION RESPIRATORY (INHALATION) at 21:17

## 2023-05-12 RX ADMIN — HYDROMORPHONE HYDROCHLORIDE 1 MG: 1 INJECTION, SOLUTION INTRAMUSCULAR; INTRAVENOUS; SUBCUTANEOUS at 21:17

## 2023-05-12 RX ADMIN — LIDOCAINE 1 PATCH: 4 PATCH TOPICAL at 22:40

## 2023-05-12 ASSESSMENT — ACTIVITIES OF DAILY LIVING (ADL)
ADLS_ACUITY_SCORE: 35

## 2023-05-12 NOTE — TELEPHONE ENCOUNTER
"Has severe Migraine H/A. Called to get refill on Imitrix. Torn Rotator cuff, seen in [facility] 4 days ago. Is not happy with care she got. Did call triage 2 days ago and was recommended to go to ED. Seen by Dentist today. Pain is a \"10\". Has RSD. Patient reports She has numbness to Left side of face currently.     Protocol reviewed, disposition to call 911. Call back with further questions/concerns.     ROLAND GREENE RN  [facility] nurse advisors  5/12/2023  6:21 PM      Reason for Disposition    [1] Numbness of the face, arm or leg on one side of the body AND [2] new-onset    Additional Information    Negative: Difficult to awaken or acting confused (e.g., disoriented, slurred speech)    Negative: [1] Weakness of the face, arm or leg on one side of the body AND [2] new-onset    Protocols used: HEADACHE-A-AH      "

## 2023-05-13 VITALS
RESPIRATION RATE: 13 BRPM | HEART RATE: 74 BPM | DIASTOLIC BLOOD PRESSURE: 70 MMHG | SYSTOLIC BLOOD PRESSURE: 118 MMHG | TEMPERATURE: 99 F | BODY MASS INDEX: 27.23 KG/M2 | HEIGHT: 62 IN | WEIGHT: 148 LBS | OXYGEN SATURATION: 97 %

## 2023-05-13 LAB — TROPONIN T SERPL HS-MCNC: 15 NG/L

## 2023-05-13 NOTE — DISCHARGE INSTRUCTIONS
Continue tramadol as previously prescribed.  Use lidocaine patches every 12 hours.  Use methocarbamol every 8 hours as needed for muscle spasm.  Follow-up with the [facility] as scheduled and also contact Dr. Lynn at the pain clinic for follow-up next week.  Return to the emergency department if you have recurrent pain not improved with these medications.

## 2023-05-13 NOTE — ED NOTES
Pt updated on lab results. Ok to drink per Dr. Aguilar. Pt has own bottled water that she brought from home

## 2023-05-13 NOTE — ED NOTES
Pt is on the phone talking to friend. Neuros intact. Smile and tongue symmetrical. Able to shrug shoulders and lift up eye brows.

## 2023-05-13 NOTE — ED TRIAGE NOTES
[facility] brought pt to ED from own home. Pt has been having multiple c/o HA, nausea, pain in neck, multiple other things. States has long long health hx including copd, chronic pain, CVA, mi, neuralgia, spinal stenosis and 1 kidney. Had episode of facial numbness on left side earlier today that lasted 20 min. States at that time ag felt like it was sitting on her chest. Was recently at  for her neck pain and they wouldn't do the MRI like she had requested     Triage Assessment       Row Name 05/12/23 1916       Triage Assessment (Adult)    Airway WDL WDL

## 2023-05-13 NOTE — ED PROVIDER NOTES
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 Granulocytes 0      NRBCs per 100 WBC 0      Absolute Neutrophils 2.5      Absolute Lymphocytes 1.7      Absolute Monocytes 0.4      Absolute Eosinophils 0.1      Absolute Basophils 0.1      Absolute Immature Granulocytes 0.0      Absolute NRBCs 0.0     TROPONIN T, HIGH SENSITIVITY         IMAGING (REVIEWED AND INTERPRETED):  MR Cervical Spine w/o Contrast   Final Result   IMPRESSION:   1.  Cervical spondylosis without high-grade central stenosis.   2.  High-grade right foraminal stenosis at C5-C6 and C6-C7.   3.  High-grade left foraminal stenosis at C5-C6.      XR Chest Port 1 View   Final Result   IMPRESSION: Hazy ill-defined opacity overlies the left lower lung zone may represent atelectasis though early infectious infiltrate could have a similar appearance and continued attention on follow-up is recommended. No focal pulmonary consolidation or    effusion otherwise. No pneumothorax. Normal heart size without pulmonary vascular congestion. Atherosclerotic calcifications of the aortic arch. Degenerative changes of the acromioclavicular and glenohumeral joints. Degenerative changes of the thoracic    spine. No acute osseous abnormality.            ECG (REVIEWED AND INTERPRETED):    ECG:   Performed at: 19:11  HR:  82bpm  Rhythm: Sinus rhythm   Axis: 39  QRS duration: 86  QTC: 462  ST changes: No ST segment elevation or depression, no T wave inversion,No Q wave  Interpretation: Sinus rhythm, no ST or T wave changes   Compared to most recent ECG from: Nov 2022 no significant change was found     I have reviewed the patient's ECG, with comments made as listed above. Please see scanned image for full interpretation.         I, Maida Grider, am serving as a scribe to document services personally performed by Mike Aguilar D.O., based on my observation and the provider s statements to me.    I, Mike Aguilar D.O., attest that Maida Grider is acting in a scribe capacity, has observed my performance of the services and has documented them in accordance with my direction.    Mike Aguilar D.O.  EMERGENCY MEDICINE   05/12/23  [facility] EMERGENCY DEPARTMENT  [address] [place] [id number]  [phone]  Dept: [phone]     Mike Aguilar DO  05/13/23 0256

## 2023-05-13 NOTE — ED NOTES
Pt amb to [facility]. States initially dizzy and c/o leg cramp. Talked with Dr. Aguilar about getting orders

## 2023-05-13 NOTE — ED NOTES
Bed: JNED-02  Expected date: 5/12/23  Expected time: 6:52 PM  Means of arrival: Ambulance  Comments:  [place] - resolved facial numbness

## 2023-05-15 ENCOUNTER — TELEPHONE (OUTPATIENT)
Dept: CARDIOLOGY | Facility: CLINIC | Age: 83
End: 2023-05-15
Payer: MEDICARE

## 2023-05-15 ENCOUNTER — TELEPHONE (OUTPATIENT)
Dept: FAMILY MEDICINE | Facility: CLINIC | Age: 83
End: 2023-05-15
Payer: MEDICARE

## 2023-05-15 NOTE — TELEPHONE ENCOUNTER
Pt is calling back to see about getting a sooner appointment.     Please advise and call patient back.

## 2023-05-15 NOTE — TELEPHONE ENCOUNTER
Reason for Call:  Appointment Request    Patient requesting this type of appt:  Hospital/ED Follow-Up     Requested provider: Caitlyn Rees    Reason patient unable to be scheduled: Not with their preferred provider    When does patient want to be seen/preferred time: 3-7 days    Comments: patient was in the ER Friday evening and was told to be seen by PCP this week - please call patient to schedule appointment - does not want to see anyone beside Dr. Rees     Not able to be seen on Wednesday morning or Friday morning     Could we send this information to you in [facility] or would you prefer to receive a phone call?:   Patient would prefer a phone call   Okay to leave a detailed message?: Yes at Home number on file [phone] (home)    Call taken on 5/15/2023 at 1:26 PM by Katheryn Dimas

## 2023-05-15 NOTE — TELEPHONE ENCOUNTER
[facility] Call Center    Phone Message    May a detailed message be left on voicemail: yes     Reason for Call: Other: Pt has been having extreme migraines and spinal injuries. She can not take triptans due to her heart disease. She would like to know what she can take for her migraines. She was in the ER for pain and she is in a lot of pain and they told her to ask her cardiologist about mediations. Please call pt back to discuss. Thank you     Action Taken: Message routed to:  Other: Cardiology    Travel Screening: Not Applicable         Thank you!  [facility]

## 2023-05-15 NOTE — TELEPHONE ENCOUNTER
Refill Request  Medication name: Sumatriptan succinate 50mg tabs         Requested Pharmacy:  [facility], [place], [place] [address] [facility] [address] [place] [address]       *not on medication list**

## 2023-05-16 ENCOUNTER — TRANSFERRED RECORDS (OUTPATIENT)
Dept: HEALTH INFORMATION MANAGEMENT | Facility: CLINIC | Age: 83
End: 2023-05-16

## 2023-05-16 NOTE — TELEPHONE ENCOUNTER
Multiple notes from today regarding this. Cardiology note states that patient can't take triptan medication due to cardiac issue.s

## 2023-05-16 NOTE — TELEPHONE ENCOUNTER
Left detailed message for Pt regarding call back request.  Per discharge instructions [facility] management of [facility]raines. -barbara

## 2023-05-17 ENCOUNTER — TELEPHONE (OUTPATIENT)
Dept: FAMILY MEDICINE | Facility: CLINIC | Age: 83
End: 2023-05-17
Payer: MEDICARE

## 2023-05-17 NOTE — TELEPHONE ENCOUNTER
General Call    Contacts       Type Contact Phone/Fax    2023 02:26 PM CDT Phone (Incoming) Sandy Spencer (Self) [phone] (M)        Reason for Call:  Pain     What are your questions or concerns:  Patient having pain. Was seen at ER on 23. Per patient she could have  taking imitrex. Medication prescribed by [facility] is $7000 out of pocket. Patient does not know what to do.     Date of last appointment with provider: N/A     Could we send this information to you in [employer] or would you prefer to receive a phone call?:   Patient would prefer a phone call   Okay to leave a detailed message?: Yes at Cell number on file:    Telephone Information:   Mobile [phone]

## 2023-05-17 NOTE — TELEPHONE ENCOUNTER
"Per patient she was having ongoing issues and \"could of have a heart attack and  from imitrex from having COVID.\"    Patient went on and on about her frustration with everything that is going on, such as a \"nurse at [facility] refused to take X-ray\" of her neck and was told to be seen by Pain Clinic or [facility]. And that all of her symptoms such as her headache and pain all happened from an injection from pain clinic 3 weeks. She stated Dr Gil injected in the wrong site (supposed to be L1 or C4 but it was injected in the \"crack of my butt\").    She went on about how Emgality and ubrelvy 100 mg was prescribed by [facility] provider that is very expensive. She is working with [facility] and [employer]. Called Uval[facility] and Demetria to get on their program. She was encouraged by writer to continue working with [facility] and her insurance regarding her meds, alternatives, and cost; which she said she is.    Writer asked what is she calling in about or want assistance from Dr Rees. She stated she was instructed by the ER provider to follow up with Dr Rees, which she cannot get in until 23. She does not want to see any other provider. Writer stated writer will send a message to Dr Rees and her care team to follow up as there is no appts with Dr Rees prior to 23.    Patient is also wondering if Dr Rees knows any PCP or specialist that know or deals with RSD (Reflex sympathetic dystrophy) that she can be referred to?     Will route to PCP to review and advise.    Bailee Merino, DUNCANN, RN  [facility]    "

## 2023-05-18 LAB
ATRIAL RATE - MUSE: 82 BPM
DIASTOLIC BLOOD PRESSURE - MUSE: NORMAL MMHG
INTERPRETATION ECG - MUSE: NORMAL
P AXIS - MUSE: 42 DEGREES
PR INTERVAL - MUSE: 124 MS
QRS DURATION - MUSE: 86 MS
QT - MUSE: 396 MS
QTC - MUSE: 462 MS
R AXIS - MUSE: 39 DEGREES
SYSTOLIC BLOOD PRESSURE - MUSE: NORMAL MMHG
T AXIS - MUSE: 78 DEGREES
VENTRICULAR RATE- MUSE: 82 BPM

## 2023-05-18 NOTE — TELEPHONE ENCOUNTER
Health Call Center    Phone Message    May a detailed message be left on voicemail: yes     Reason for Call: Other: Patient returning call for Hanna Hastings. Please call patient back to further discuss, thank you.    Action Taken: Message routed to:  Other: Cardiology    Travel Screening: Not Applicable     Thank you!  [facility]

## 2023-05-18 NOTE — TELEPHONE ENCOUNTER
Pt notified and states understanding. Pt will call back Monday to check for any cancellations. Pt wondering if Dr. Rees can order an MRI for her R hip as she is needing that done sooner rather than later.     Pt will do a referral to pain clinic but only wants to see someone that knows about RSD.

## 2023-05-18 NOTE — TELEPHONE ENCOUNTER
I just really don't have any availability sooner than that right now but there are frequently cancellations if she wants to check again Monday.     In regards to the medication that is expensive, she should reach out to [facility] and see if they have any other options for now until they figure out what they can do about the cost.     RSD, I'd be happy to refer her back to the pain clinic and have her see someone. She could try [facility] rather than our pain clinic if she'd like.

## 2023-05-18 NOTE — TELEPHONE ENCOUNTER
I just don't really have any availability this week. She may need to see a partner.     Otherwise we can look at next week or the week after.

## 2023-05-22 ENCOUNTER — TRANSFERRED RECORDS (OUTPATIENT)
Dept: HEALTH INFORMATION MANAGEMENT | Facility: CLINIC | Age: 83
End: 2023-05-22
Payer: MEDICARE

## 2023-05-24 DIAGNOSIS — F06.4 ANXIETY DISORDER DUE TO MEDICAL CONDITION: ICD-10-CM

## 2023-05-25 ENCOUNTER — HOSPITAL ENCOUNTER (OUTPATIENT)
Dept: MRI IMAGING | Facility: HOSPITAL | Age: 83
Discharge: HOME | End: 2023-05-25
Attending: ANESTHESIOLOGY | Admitting: ANESTHESIOLOGY
Payer: MEDICARE

## 2023-05-25 DIAGNOSIS — F06.4 ANXIETY DISORDER DUE TO MEDICAL CONDITION: ICD-10-CM

## 2023-05-25 DIAGNOSIS — M54.16 LUMBAR RADICULOPATHY: ICD-10-CM

## 2023-05-25 PROCEDURE — 72148 MRI LUMBAR SPINE W/O DYE: CPT | Mod: MF

## 2023-05-25 RX ORDER — VENLAFAXINE HYDROCHLORIDE 150 MG/1
CAPSULE, EXTENDED RELEASE ORAL
Qty: 90 CAPSULE | Refills: 0 | OUTPATIENT
Start: 2023-05-25

## 2023-05-25 NOTE — TELEPHONE ENCOUNTER
Refused as there should already be a refill on file.  Filled 5/16/2023 disp 90 with 1 refill  Deonna Daniel RN   05/25/23 1:24 PM  [facility] Nurse Advisor

## 2023-05-26 RX ORDER — VENLAFAXINE HYDROCHLORIDE 150 MG/1
150 CAPSULE, EXTENDED RELEASE ORAL DAILY
Qty: 90 CAPSULE | Refills: 1 | Status: SHIPPED | OUTPATIENT
Start: 2023-05-26 | End: 2023-08-02

## 2023-05-31 ENCOUNTER — TELEPHONE (OUTPATIENT)
Dept: FAMILY MEDICINE | Facility: CLINIC | Age: 83
End: 2023-05-31

## 2023-05-31 RX ORDER — METHOCARBAMOL 500 MG/1
500 TABLET, FILM COATED ORAL 4 TIMES DAILY PRN
Qty: 28 TABLET | Refills: 0 | Status: CANCELLED | OUTPATIENT
Start: 2023-05-31

## 2023-05-31 NOTE — TELEPHONE ENCOUNTER
She did have an xray of the hip in May, showing arthritis. An MRI probably isn't going to be more helpful, I'd be happy to have her see orthopedics if she'd like to talk about an injection in the hip.     For the back, what part is painful?

## 2023-05-31 NOTE — TELEPHONE ENCOUNTER
"Called Sandy to relay Dr. Rees's message to her which led to a very lengthy conversation where Sandy expressed her very many frustrations with [facility] and the lack of follow through in many instances - I did go ahead and give her the patient advocacy phone number to address these concerns.     Patient was very disappointed that she was not seen today and does not want to see ortho or get any sort of injection into her hip. She is insistent that she needs an MRI of her hip - she is well aware she had an xray of her hip but per her chiropractors recommendations she would really like to get an MRI of her hip.     She is very concerned that when she was in the hospital her troponin level was at a 15 and no one has followed up on this.    She was prescribed Ubrelvy at [facility] which she cannot afford and they gave her immitrex at one point which she states \"Could have killed me!\" due to her heart problems. She is still struggling with migraines and would really like an affordable medication prescribed asap.    Routing to PCP to advise. Patient is scheduled for 6/14 and I did tell her that I would keep her in mind if there were any cancellations.    Charu Parra, [facility]      "

## 2023-05-31 NOTE — TELEPHONE ENCOUNTER
Refill Request  Medication name: Pending Prescriptions:                       Disp   Refills    methocarbamol (ROBAXIN) 500 MG tablet     28 tab*0            Sig: Take 1 tablet (500 mg) by mouth 4 times daily as           needed for muscle spasms    Requested Pharmacy:  [facility] [place] [address]

## 2023-05-31 NOTE — TELEPHONE ENCOUNTER
Pt rescheduled since Dr. Rees was unavailable for her appt on 5/31.    Patient stated that she would like an MRI of her right hip d/t pain issues that has lasted since the first week of Feb. Pt also stated that the MRI that was done for her low back was the wrong imaging and did not correlate with the pain she is currently experiencing.    Pt would like to get in sooner if at all possible, pt does understand that she was scheduled for the soonest possible opening for Dr. Rees at this time, and would like to be called if anything arises.

## 2023-06-01 ENCOUNTER — OFFICE VISIT (OUTPATIENT)
Dept: PALLIATIVE MEDICINE | Facility: OTHER | Age: 83
End: 2023-06-01
Attending: STUDENT IN AN ORGANIZED HEALTH CARE EDUCATION/TRAINING PROGRAM
Payer: MEDICARE

## 2023-06-01 ENCOUNTER — TELEPHONE (OUTPATIENT)
Dept: PALLIATIVE MEDICINE | Facility: OTHER | Age: 83
End: 2023-06-01

## 2023-06-01 VITALS — SYSTOLIC BLOOD PRESSURE: 111 MMHG | DIASTOLIC BLOOD PRESSURE: 59 MMHG | OXYGEN SATURATION: 96 % | HEART RATE: 88 BPM

## 2023-06-01 DIAGNOSIS — M54.16 LUMBAR RADICULOPATHY: Primary | ICD-10-CM

## 2023-06-01 DIAGNOSIS — M47.812 CERVICAL SPONDYLOSIS WITHOUT MYELOPATHY: Primary | ICD-10-CM

## 2023-06-01 DIAGNOSIS — M16.11 ARTHROPATHY OF RIGHT HIP: ICD-10-CM

## 2023-06-01 DIAGNOSIS — M16.12 ARTHROPATHY OF LEFT HIP: ICD-10-CM

## 2023-06-01 PROCEDURE — G0463 HOSPITAL OUTPT CLINIC VISIT: HCPCS

## 2023-06-01 PROCEDURE — 99215 OFFICE O/P EST HI 40 MIN: CPT | Performed by: STUDENT IN AN ORGANIZED HEALTH CARE EDUCATION/TRAINING PROGRAM

## 2023-06-01 RX ORDER — METHOCARBAMOL 500 MG/1
500 TABLET, FILM COATED ORAL 2 TIMES DAILY PRN
Qty: 56 TABLET | Refills: 0 | Status: SHIPPED | OUTPATIENT
Start: 2023-06-01 | End: 2023-07-05

## 2023-06-01 RX ORDER — DEXTROAMPHETAMINE SACCHARATE, AMPHETAMINE ASPARTATE, DEXTROAMPHETAMINE SULFATE AND AMPHETAMINE SULFATE 5; 5; 5; 5 MG/1; MG/1; MG/1; MG/1
TABLET ORAL
Qty: 60 TABLET | Refills: 0 | Status: SHIPPED | OUTPATIENT
Start: 2023-06-01 | End: 2023-06-23

## 2023-06-01 ASSESSMENT — PAIN SCALES - GENERAL: PAINLEVEL: MODERATE PAIN (5)

## 2023-06-01 NOTE — TELEPHONE ENCOUNTER
Screening Questions for Radiology Injections:  Right Hip Injection    Injection to be done at which interventional clinic site? [facility]    Procedure ordered by Dr. Ronnie Gil    Procedure ordered? Right Hip Injection      Transforaminal Cervical ANTONY - Send to [facility] - No [facility] providers perform this procedure    What insurance would patient like us to bill for this procedure?   Medicare and AARP    IF SCHEDULING IN [place] PAIN OR SPINE PLEASE SCHEDULE AT LEAST 7-10 BUSINESS DAYS OUT SO A PA CAN BE OBTAINED    Worker's comp or MVA (motor vehicle accident) -Any injection DO NOT SCHEDULE and route to Wicho Damon.      [employer] insurance - For SI joint injections, DO NOT SCHEDULE and route to Alejandra Alli.       ALL BCBS, [employer] and [employer] - DO NOT SCHEDULE and route to Alejandra Alli    MEDICA- facet joint injections, route to Alejandra Alli    Is patient scheduled at [facility]? No   If YES, route every encounter to [facility] CARE NAVIGATION POOL [[phone]]    Is an  needed? No     Patient has a  home? (Review Grid) YES:     Any chance of pregnancy? NO   If YES, do NOT schedule and route to RN pool  - Dr. Pike route to Sammie Somers and PM&R Nurse  [[id number]]      Is patient actively being treated for cancer or immunocompromised? No  If YES, do NOT schedule and route to RN pool/ Dr. Pike's Team    Does the patient have a bleeding or clotting disorder? No     If YES, okay to schedule AND route to RN nurse pool/ Dr. Pike's Team     (For any patients with platelet count <100, RN must forward to provider)    Is patient taking any Blood Thinners OR Antiplatelet medication? Yes - Eliquis  If hold needed, do NOT schedule, route to RN pool/ Dr. Pike's Team    Examples:   o Blood Thinners: (Coumadin, Warfarin, Jantoven, Pradaxa, Xarelto, Eliquis, Edoxaban, Enoxaparin, Lovenox, Heparin, Arixtra, Fondaparinux or Fragmin)  o Antiplatelet  Medications: (Plavix, Brilinta or Effient)     Is patient taking any aspirin products (includes Excedrin and Fiorinal)? No     If more than 325mg/day, OK to schedule; Instruct Pt to decrease to less than 325 mg for 7 days AND route to RN pool/ Dr. Pike's Team     For CERVICAL procedures, hold all aspirin products for 6 days.     Tell Pt that if aspirin product is not held for 6 days, the procedure WILL BE cancelled.     Any allergies to contrast dye, iodine, shellfish, or numbing and steroid medications? No    If YES, schedule and add allergy information to appointment notes AND route to the RN pool/ Dr. Pike's Team    If ANTONY and Contrast Dye / Iodine Allergy? DO NOT SCHEDULE, route to RN pool/ Dr. Pike's Team    Allergies: Acamprosate, Amoxicillin-pot clavulanate, Carbamazepine, Cyclobenzaprine, Mirtazapine, Prednisone, Pregabalin, Sulfa antibiotics, Sulfamethoxazole-trimethoprim, Zolpidem, Acetaminophen, Amiloride, Amoxicillin, Aspirin, Bupropion, Chromium, Duloxetine hcl, Nsaids, Other drug allergy (see comments), Oxycodone, Serotonin, Sulindac, Tolmetin, Verapamil, and Valproic acid     Does patient have an active infection or treated for one within the past week? No    Is patient currently taking any antibiotics or steroid medications?  No     For patients on chronic, preventative, or prophylactic antibiotics, procedures may be scheduled.     For patients on antibiotics for active or recent infection, schedule 4 days after completed.    For patients on steroid medications, schedule 4 days after completed.     Has the patient had a flu shot or any other vaccinations within the past 7 days? No  If yes, explain that for the vaccine to work best they need to:       wait 1 week before and 1 week after getting any Vaccine    wait 1 week before and 2 weeks after getting Covid Vaccine #2 or BOOSTER    If patient has concerns about the timing, send to RN pool/ Dr. Pike's Team    Does patient have an MRI/CT? NOT  APPLICABLE:  Include Date and Check Procedure Scheduling Grid to see if required.    Was the MRI/CT done within the last 3 years?  NA     If no route to RN Pool/ Dr. Pike's Team    If yes, where was the MRI/CT done?      Refer to PACS Transmissions list for approved external locations and route to RN Pool High Priority/ Dr. Zayass Team    If MRI was not done at approved external location do NOT schedule and route to RN pool/ Dr. Pike's Team      If patient has an imaging disc, the injection MAY be scheduled but patient must bring disc to appt or appt will be cancelled.    Is patient able to transfer to a procedure table with minimal or no assistance? Yes     If no, do NOT schedule and route to RN Pool/ Dr. Pike's Team    Procedure Specific Instructions:    If celiac plexus block, informed patient NPO for 6 hours and that it is okay to take medications with sips of water, especially blood pressure medications Not Applicable         If this is for a cervical procedure, informed patient that aspirin needs to be held for 6 days.   Not Applicable      Sedation, If Sedation is ordered for any procedure, patient must be NPO for 6 hours prior to procedure Not Applicable      If IV needed:    Do not schedule procedures requiring IV placement in the first appointment of the day or first appointment after lunch. Do NOT schedule at 0745, 0815 or 1245.     Instructed patient to arrive 30 minutes early for IV start if required. (Check Procedure Scheduling Grid)  Not Applicable    Reminders:    If you are started on any steroids or antibiotics between now and your appointment, you must contact us because the procedure may need to be cancelled.  Yes      As a reminder, receiving steroids can decrease your body's ability to fight infection.   Would you still like to move forward with scheduling the injection?  Yes      IV Sedation is not provided for procedures. If oral anti-anxiety medication is needed, the patient should  request this from their referring provider.      Instruct patient to arrive as directed prior to the scheduled appointment time:  If IV needed 30 minutes before appointment time       For patients 85 or older we recommend having an adult stay w/ them for the remainder of the day.       If the patient is Diabetic, remind them to bring their glucometer.      Does the patient have any questions?  NO  Jessica Hobbs  [facility]

## 2023-06-01 NOTE — TELEPHONE ENCOUNTER
Patient requesting a refill of methocarbamol (ROBAXIN) 500 MG tablet. Says DR. Lynn refills the medication as well as adderall 20 mg. Forwarding to DR. Lynn for review with patient asking for a refill. methocarbamol (ROBAXIN) 500 MG tablet was last refilled by the emergency room 05/12/2023. Also scanned into chart a referral for medication and Pain procedure for another clinic.     Sammie Go MA  [facility]

## 2023-06-01 NOTE — PATIENT INSTRUCTIONS
Procedures: there are several relevant procedures to consider, based on our examination and the patient's history. We will address these from highest to lower yield and be mindful of total body steroid dosing.  Left TON, C3, C4 MBB/RFA ordered  Left hip CSI ordered  R Suprascapular nerve block can be considered in future for rotator cuff tendinopathy  ONBs by [facility] Mathis ANTONY did not help after 1 day, would not repeat  Physical Therapy: Prior to August 2022 - occasionally doing HEP  Diagnostic Studies: reviewed MRI C spine and L spine  Medication Management: per Dr. Lynn  Follow up: as needed    Ronnie Gil MD  Interventional Pain Medicine  [facility] Number:  [phone]  Call with any questions about your care and for scheduling assistance.   Calls are returned Monday through Friday between 8 AM and 4:30 PM. We usually get back to you within 2 business days depending on the issue/request.    If we are prescribing your medications:  For opioid medication refills, call the clinic or send a [employer] message 7 days in advance.  Please include:  Name of requested medication  Name of the pharmacy.  For non-opioid medications, call your pharmacy directly to request a refill. Please allow 3-4 days to be processed.   Per [place] State Law:  All controlled substance prescriptions must be filled within 30 days of being written.    For those controlled substances allowing refills, pickup must occur within 30 days of last fill.      We believe regular attendance is key to your success in our program!    Any time you are unable to keep your appointment we ask that you call us at least 24 hours in advance to cancel.This will allow us to offer the appointment time to another patient.   Multiple missed appointments may lead to dismissal from the clinic.

## 2023-06-01 NOTE — NURSING NOTE
Patient presents to the clinic today for a follow up with Ronnie Gil MD  regarding Pain Management.       UDT/CSA = 5/4/22      MEDICATIONS LAST USED:    traMADol (ULTRAM) 50 MG tablet- patient reports last taken. Patient can't remember when she last taken the medication. Thinks in the last month.     QUESTIONS:      Sammie Go MA  [facility]

## 2023-06-01 NOTE — PROGRESS NOTES
[facility] Follow-up    Date of visit: 6/1/2023    Chief complaint:   Chief Complaint   Patient presents with     Pain       Interval history:  Since her last visit, aSndy Spencer reports:  Had a caudal ANTONY which helped for 1 day but wore off.  Has R hip pain as well.    Pain scores:  Pain intensity currently is 5 on a scale of 0-10.     Past pain treatments:  Sandy Spencer has been seen at a pain clinic in the past.  Seen [facility] and one in [place]. Sees Dr. Lynn  PT: HEP  Injections:    4/27/23 - Caudal ANTONY - significant relief for 1 day              TPI in neck at [facility] - lasted 6 weeks               Botox for headaches - marginal help              2010 Cervical RFA - pain free              5/23/22 Bilateral SIJ injections - [facility]               1/11/22 left L5 TFESI  Surgery: laminectomy left L4-5 2015 or 2016, and right L5-S1 hemilami    Medications:  Current Outpatient Medications   Medication Sig Dispense Refill     apixaban ANTICOAGULANT (ELIQUIS) 5 MG tablet Take 1 tablet (5 mg) by mouth 2 times daily 180 tablet 3     apixaban ANTICOAGULANT (ELIQUIS) 5 MG tablet Take 1 tablet (5 mg) by mouth 2 times daily 180 tablet 3     azelastine (ASTELIN) 0.1 % nasal spray 2 sprays each nostril 1-2x daily as needed for nasal congestion (use nightly for first 2 week) 30 mL 11     Cholecalciferol (VITAMIN D-3) 125 MCG (5000 UT) TABS Take 5,000 Units by mouth daily       ipratropium - albuterol 0.5 mg/2.5 mg/3 mL (DUONEB) 0.5-2.5 (3) MG/3ML neb solution Take 1 vial by nebulization every 6 hours as needed for shortness of breath, wheezing or cough       levETIRAcetam (KEPPRA) 250 MG tablet Take 1 tablet (250 mg) by mouth 4 times daily 360 tablet 1     levothyroxine (SYNTHROID/LEVOTHROID) 50 MCG tablet Take 1 tablet (50 mcg) by mouth daily 90 tablet 3     methocarbamol (ROBAXIN) 500 MG tablet Take 1 tablet (500 mg) by mouth 4 times daily as needed for  muscle spasms 28 tablet 0     rosuvastatin (CRESTOR) 40 MG tablet Take 1 tablet (40 mg) by mouth daily 90 tablet 3     torsemide (DEMADEX) 10 MG tablet Take 1 tablet (10 mg) by mouth daily 90 tablet 3     traMADol (ULTRAM) 50 MG tablet One to two tabs three times a day as needed 60 tablet 1     traZODone (DESYREL) 100 MG tablet Take 4 tablets (400 mg) by mouth At Bedtime 360 tablet 1     venlafaxine (EFFEXOR XR) 150 MG 24 hr capsule Take 1 capsule (150 mg) by mouth daily 90 capsule 1     zonisamide (ZONEGRAN) 25 MG capsule Take 2 capsules (50 mg) by mouth At Bedtime 60 capsule 3     albuterol (PROAIR HFA/PROVENTIL HFA/VENTOLIN HFA) 108 (90 Base) MCG/ACT inhaler Inhale 2 puffs into the lungs every 6 hours (Patient not taking: Reported on 6/1/2023) 18 g 4     Lidocaine (LIDOCARE) 4 % Patch Place 1 patch onto the skin every 24 hours To prevent lidocaine toxicity, patient should be patch free for 12 hrs daily. (Patient not taking: Reported on 6/1/2023) 15 patch 0       Medical History: any changes in medical history since they were last seen? No    Physical Exam:  Blood pressure 111/59, pulse 88, SpO2 96 %, not currently breastfeeding.  Constitutional: Well developed, NAD  Head: normocephalic. Atraumatic.   Eyes: no redness or jaundice noted   CV: warm, well perfused extremities   Skin: no suspicious lesions or rashes   Psychiatric: mentation appears normal and affect full  Focused MSK exam: Positive FADIR on right, negative scour    Diagnostic Imaging:  EXAM: MR LUMBAR SPINE W/O CONTRAST  LOCATION: [facility]  DATE/TIME: 5/25/2023 8:53 PM CDT     INDICATION: Increased pain since caudal nerve injection.  COMPARISON: Lumbar spine MRI 12/18/2021.  TECHNIQUE: Routine Lumbar Spine MRI without IV contrast.     FINDINGS: Five lumbar type vertebrae counting down from the presumed 12th ribs. The tip of the conus medullaris is at L1-L2. The cauda equina roots and visualized lower thoracic spinal cord are  within normal limits. No epidural fluid collection.     Normal alignment of the lumbar vertebrae. Normal lumbar lordosis. Normal vertebral body heights and marrow signal. No fracture, destructive bone lesion or infection. Postsurgical changes of right hemilaminectomy at L5-S1. Moderate to severe symmetric   lower lumbar predominant dorsal paraspinous muscular atrophy. Stable small cystic T2 signal focus within the right hepatic lobe. The visualized bony pelvis is unremarkable.     Findings on a level by level basis are as follows:     T12-L1: Normal disc height. Loss of disc signal. 2 mm AP dimension central disc protrusion. Mild facet arthrosis. Mild left neural foraminal stenosis. The right neural foramen is widely patent. Mild spinal canal stenosis.     L1-L2: Mild disc height loss. Loss of disc signal. Disc bulge. No disc herniation. Facet arthrosis and ligamentum flavum thickening. Epidural lipomatosis. Mild bilateral neural foraminal stenosis. Mild spinal canal stenosis.     L2-L3: Mild disc height loss. Loss of disc signal. Disc bulge. No disc herniation. Facet arthrosis and ligamentum flavum thickening. Epidural lipomatosis. Mild bilateral neural foraminal stenosis. Mild to moderate spinal canal stenosis.     L3-L4: Moderate disc height loss. Loss of disc signal. Disc bulge. No disc herniation. Facet arthrosis and ligamentum flavum thickening. Mild to moderate bilateral neural foraminal stenosis. Moderate spinal canal stenosis.     L4-L5: Moderate to severe disc height loss. Loss of disc signal. Disc bulge with superimposed 6 mm AP dimension inferiorly migrated left subarticular disc extrusion, which effaces the left lateral recess and displaces the descending left L5 nerve roots.   Facet arthrosis. Moderate left and mild right neural foraminal stenosis. Mild to moderate spinal canal stenosis.     L5-S1: Right hemilaminectomy defect. Moderate to severe disc height loss. Loss of disc signal. Disc bulge. No  disc herniation. Facet arthrosis. Moderate bilateral neural foraminal stenosis. No significant spinal canal stenosis.                                                                      IMPRESSION:  1. No significant change since 12/18/2021. Stable multilevel lumbar spondylosis status post right L5-S1 hemilaminectomy.  2. No evidence of postprocedural complication status post caudal injection.  3. Moderate-sized inferiorly migrated left subarticular disc extrusion at L4-L5 displaces and possibly impinges the descending left L5 nerve roots.  4. Moderate neural foraminal stenosis on the left at L4-L5 and bilaterally at L5-S1.  5. Moderate spinal canal stenosis at L3-L4. Mild to moderate spinal canal stenosis at L4-L5.    EXAM: MR CERVICAL SPINE W/O CONTRAST  LOCATION: [facility]  DATE/TIME: 5/12/2023 10:27 PM CDT     INDICATION: neck pain  COMPARISON: None.  TECHNIQUE: Routine cervical spine MRI without IV contrast.     FINDINGS:   Alignment: Straightening of the usual cervical lordosis. Minor anterolisthesis C4-C5, 2 mm; at C7-T1, 3 mm. Minor retrolisthesis C6-C7, 2 mm.  Vertebral height: No acute fracture. Cervical vertebral body heights are maintained.  Marrow signal: Normal. Intraosseous hemangioma at T2.   Spinal cord: No abnormal signal.  Extraspinal: No extraspinal abnormality.      Craniovertebral junction: Normal.     C1-2: Mild degenerative changes.     C2-3: Normal height of disc. Desiccated disc. No disc herniation. No uncovertebral hypertrophy. Mild facet arthrosis. No central spinal stenosis, no right foramen stenosis, no left foramen stenosis.     C3-4: Normal height of disc. Desiccated disc. Shallow disc osteophyte complex. No uncovertebral hypertrophy. Mild bilateral facet hypertrophy. Mild central spinal stenosis, mild right foramen stenosis, mild left foramen stenosis.     C4-5:  Normal height of disc. Desiccated disc. Shallow disc osteophyte complex. No uncovertebral  hypertrophy. Mild bilateral facet hypertrophy. Mild central spinal stenosis, moderate right foramen stenosis, moderate left foramen stenosis.     C5-6: Marked loss of disc height. Markedly degenerated disc. Broad based disc osteophyte complex. No uncovertebral hypertrophy. Mild bilateral facet hypertrophy. Mild to moderate central spinal stenosis, severe right foramen stenosis, severe left foramen   stenosis.     C6-7: Marked loss of disc height. Markedly degenerated disc. Broad based disc osteophyte complex. No uncovertebral hypertrophy. Mild bilateral facet hypertrophy. Mild to moderate central spinal stenosis, severe right foramen stenosis, moderate left   foramen stenosis.     C7-T1: Normal height of disc. Desiccated disc. Shallow disc osteophyte complex. No uncovertebral hypertrophy. Mild bilateral facet hypertrophy. No central spinal stenosis, no right foramen stenosis, no left foramen stenosis.                                                                      IMPRESSION:  1.  Cervical spondylosis without high-grade central stenosis.  2.  High-grade right foraminal stenosis at C5-C6 and C6-C7.  3.  High-grade left foraminal stenosis at C5-C6.    EXAM: XR HIP RIGHT 2-3 VIEWS  LOCATION: [facility]  DATE/TIME: 5/8/2023 4:40 PM CDT     INDICATION: Chronic pain, right hip.  COMPARISON: None.                                                                      IMPRESSION: No fracture. Moderate degenerative changes in the right hip. Postop changes in the right upper pelvis. Osteopenia.    Personally reviewed imaging: yes    Assessment:    1. Cervical spondylosis  2. Bilateral occipital neuralgia  3. Cervical radiculopathy - right  4. Chronic right rotator cuff tears  5. Left L5 lumbar radiculopathy    Sandy Spencer is a 82 year old female who presents for follow-up of her chronic left sided neck pain, low back pain, and evaluation of right-sided hip pain.  She notes the caudal epidural  steroid injection did not help significantly.  She would like to proceed interventionally with a right-sided hip injection as well as left T1, C3, C4 MBB/RFA.  Based on her imaging of the right hip, it appears there is some moderate degenerative change which may be a cause of her right groin pain.  We spent significant time discussing the effect of her prior ANTONY as well as her concerns for undergoing further interventions for the neck pain.     Plan:  Diagnosis reviewed, treatment option addressed, and risk/benefits discussed.      1. Procedures: there are several relevant procedures to consider, based on our examination and the patient's history. We will address these from highest to lower yield and be mindful of total body steroid dosing.  1. Left TON, C3, C4 MBB/RFA ordered  2. Right hip CSI ordered  3. R Suprascapular nerve block can be considered in future for rotator cuff tendinopathy  4. ONBs by [facility]  5. Caudal ANTONY did not help after 1 day, would not repeat  2. Physical Therapy: Prior to August 2022 - occasionally doing HEP  3. Diagnostic Studies: reviewed new MRI C spine and L spine and R hip XR  4. Medication Management: per Dr. Lynn  5. Follow up: as needed    41 minutes spent on the date of encounter doing chart review, history, and exam documentation and further activities as noted above.     Ronnie Gil MD  Interventional Pain Medicine  [facility]

## 2023-06-03 ENCOUNTER — HEALTH MAINTENANCE LETTER (OUTPATIENT)
Age: 83
End: 2023-06-03

## 2023-06-12 ENCOUNTER — TELEPHONE (OUTPATIENT)
Dept: FAMILY MEDICINE | Facility: CLINIC | Age: 83
End: 2023-06-12
Payer: MEDICARE

## 2023-06-12 NOTE — TELEPHONE ENCOUNTER
Reason for Call:  Appointment Request    Patient requesting this type of appt:  Follow up/medication check    Requested provider: Caitlyn Rees    Reason patient unable to be scheduled: Not within requested timeframe    When does patient want to be seen/preferred time: 3-7 days    Comments: Patient calling to request change to current appt.  She is currently scheduled for Wednesday morning (6/14/23), but her daughter is not able to bring her that morning.  She is attempting to find alternative transportation, but isn't sure she will be able to do so.  Patient is requesting call back from Dr Rees staff to discuss possibility of an appt later in the day, perhaps over the lunch hour or if a telephone appt would be appropriate for her so transportation wouldn't be needed.  Please call patient to discuss.    Could we send this information to you in [place] or would you prefer to receive a phone call?:   Patient would prefer a phone call   Okay to leave a detailed message?: Yes at Cell number on file:    Telephone Information:   Mobile [phone]       Call taken on 6/12/2023 at 8:47 AM by Ramiro Bain

## 2023-06-13 ENCOUNTER — TELEPHONE (OUTPATIENT)
Dept: FAMILY MEDICINE | Facility: CLINIC | Age: 83
End: 2023-06-13
Payer: MEDICARE

## 2023-06-13 NOTE — TELEPHONE ENCOUNTER
Left message to call back for: patient  Information to relay to patient: will discuss with Dr Rees.       Partha for virtual visit? Appt is for med check and last visit was 11/29/22, in person, pre op.

## 2023-06-13 NOTE — TELEPHONE ENCOUNTER
Reason for call:  Other     Patient called regarding (reason for call): call back    Additional comments: Patient is returning call and wants someone from her clinic to call her back please and thank you.    Phone number to reach patient:  Cell number on file:    Telephone Information:   Mobile [phone]       Best Time:  any    Can we leave a detailed message on this number?  YES

## 2023-06-14 ENCOUNTER — OFFICE VISIT (OUTPATIENT)
Dept: FAMILY MEDICINE | Facility: CLINIC | Age: 83
End: 2023-06-14
Payer: MEDICARE

## 2023-06-14 VITALS
BODY MASS INDEX: 26.7 KG/M2 | SYSTOLIC BLOOD PRESSURE: 134 MMHG | OXYGEN SATURATION: 97 % | HEART RATE: 86 BPM | WEIGHT: 146 LBS | DIASTOLIC BLOOD PRESSURE: 62 MMHG | RESPIRATION RATE: 14 BRPM | TEMPERATURE: 98.2 F

## 2023-06-14 DIAGNOSIS — G89.29 CHRONIC NECK PAIN: ICD-10-CM

## 2023-06-14 DIAGNOSIS — N18.4 CKD (CHRONIC KIDNEY DISEASE) STAGE 4, GFR 15-29 ML/MIN (H): Primary | ICD-10-CM

## 2023-06-14 DIAGNOSIS — R09.81 NASAL CONGESTION: ICD-10-CM

## 2023-06-14 DIAGNOSIS — G47.30 SLEEP APNEA, UNSPECIFIED TYPE: ICD-10-CM

## 2023-06-14 DIAGNOSIS — G43.809 OTHER MIGRAINE WITHOUT STATUS MIGRAINOSUS, NOT INTRACTABLE: ICD-10-CM

## 2023-06-14 DIAGNOSIS — N18.4 ANEMIA IN STAGE 4 CHRONIC KIDNEY DISEASE (H): ICD-10-CM

## 2023-06-14 DIAGNOSIS — E03.9 HYPOTHYROIDISM, UNSPECIFIED TYPE: ICD-10-CM

## 2023-06-14 DIAGNOSIS — M54.2 CHRONIC NECK PAIN: ICD-10-CM

## 2023-06-14 DIAGNOSIS — M19.041 ARTHRITIS OF FINGER OF RIGHT HAND: ICD-10-CM

## 2023-06-14 DIAGNOSIS — B00.9 HERPES SIMPLEX VIRUS INFECTION: ICD-10-CM

## 2023-06-14 DIAGNOSIS — D63.1 ANEMIA IN STAGE 4 CHRONIC KIDNEY DISEASE (H): ICD-10-CM

## 2023-06-14 LAB
ANION GAP SERPL CALCULATED.3IONS-SCNC: 15 MMOL/L (ref 7–15)
BUN SERPL-MCNC: 28.7 MG/DL (ref 8–23)
CALCIUM SERPL-MCNC: 9.5 MG/DL (ref 8.8–10.2)
CHLORIDE SERPL-SCNC: 98 MMOL/L (ref 98–107)
CREAT SERPL-MCNC: 1.68 MG/DL (ref 0.51–0.95)
CREAT UR-MCNC: 55.4 MG/DL
DEPRECATED HCO3 PLAS-SCNC: 23 MMOL/L (ref 22–29)
ERYTHROCYTE [DISTWIDTH] IN BLOOD BY AUTOMATED COUNT: 14.3 % (ref 10–15)
GFR SERPL CREATININE-BSD FRML MDRD: 30 ML/MIN/1.73M2
GLUCOSE SERPL-MCNC: 90 MG/DL (ref 70–99)
HCT VFR BLD AUTO: 33.8 % (ref 35–47)
HGB BLD-MCNC: 11 G/DL (ref 11.7–15.7)
MCH RBC QN AUTO: 31.4 PG (ref 26.5–33)
MCHC RBC AUTO-ENTMCNC: 32.5 G/DL (ref 31.5–36.5)
MCV RBC AUTO: 97 FL (ref 78–100)
MICROALBUMIN UR-MCNC: <12 MG/L
MICROALBUMIN/CREAT UR: NORMAL MG/G{CREAT}
PLATELET # BLD AUTO: 217 10E3/UL (ref 150–450)
POTASSIUM SERPL-SCNC: 3.9 MMOL/L (ref 3.4–5.3)
RBC # BLD AUTO: 3.5 10E6/UL (ref 3.8–5.2)
SODIUM SERPL-SCNC: 136 MMOL/L (ref 136–145)
TSH SERPL DL<=0.005 MIU/L-ACNC: 1.19 UIU/ML (ref 0.3–4.2)
URATE SERPL-MCNC: 8 MG/DL (ref 2.4–5.7)
WBC # BLD AUTO: 6.3 10E3/UL (ref 4–11)

## 2023-06-14 PROCEDURE — 82043 UR ALBUMIN QUANTITATIVE: CPT | Performed by: FAMILY MEDICINE

## 2023-06-14 PROCEDURE — 85027 COMPLETE CBC AUTOMATED: CPT | Performed by: FAMILY MEDICINE

## 2023-06-14 PROCEDURE — 84443 ASSAY THYROID STIM HORMONE: CPT | Performed by: FAMILY MEDICINE

## 2023-06-14 PROCEDURE — 82570 ASSAY OF URINE CREATININE: CPT | Performed by: FAMILY MEDICINE

## 2023-06-14 PROCEDURE — 99215 OFFICE O/P EST HI 40 MIN: CPT | Performed by: FAMILY MEDICINE

## 2023-06-14 PROCEDURE — 80048 BASIC METABOLIC PNL TOTAL CA: CPT | Performed by: FAMILY MEDICINE

## 2023-06-14 PROCEDURE — 84550 ASSAY OF BLOOD/URIC ACID: CPT | Performed by: FAMILY MEDICINE

## 2023-06-14 PROCEDURE — 36415 COLL VENOUS BLD VENIPUNCTURE: CPT | Performed by: FAMILY MEDICINE

## 2023-06-14 RX ORDER — ZONISAMIDE 25 MG/1
25 CAPSULE ORAL AT BEDTIME
Qty: 15 CAPSULE | Refills: 0 | Status: SHIPPED | OUTPATIENT
Start: 2023-06-14 | End: 2023-07-12

## 2023-06-14 RX ORDER — DIVALPROEX SODIUM 250 MG/1
250 TABLET, DELAYED RELEASE ORAL 2 TIMES DAILY
Qty: 60 TABLET | Refills: 2 | Status: SHIPPED | OUTPATIENT
Start: 2023-06-14 | End: 2023-07-17

## 2023-06-14 RX ORDER — AZELASTINE 1 MG/ML
SPRAY, METERED NASAL
Qty: 30 ML | Refills: 11 | Status: SHIPPED | OUTPATIENT
Start: 2023-06-14 | End: 2024-02-05

## 2023-06-14 RX ORDER — LIDOCAINE 4 G/G
2 PATCH TOPICAL EVERY 24 HOURS
Qty: 60 PATCH | Refills: 5 | Status: SHIPPED | OUTPATIENT
Start: 2023-06-14 | End: 2024-02-05

## 2023-06-14 RX ORDER — VALACYCLOVIR HYDROCHLORIDE 500 MG/1
500 TABLET, FILM COATED ORAL DAILY
Qty: 90 TABLET | Refills: 3 | Status: SHIPPED | OUTPATIENT
Start: 2023-06-14

## 2023-06-14 ASSESSMENT — ANXIETY QUESTIONNAIRES
3. WORRYING TOO MUCH ABOUT DIFFERENT THINGS: SEVERAL DAYS
7. FEELING AFRAID AS IF SOMETHING AWFUL MIGHT HAPPEN: SEVERAL DAYS
2. NOT BEING ABLE TO STOP OR CONTROL WORRYING: SEVERAL DAYS
5. BEING SO RESTLESS THAT IT IS HARD TO SIT STILL: NOT AT ALL
6. BECOMING EASILY ANNOYED OR IRRITABLE: NEARLY EVERY DAY
6. BECOMING EASILY ANNOYED OR IRRITABLE: NEARLY EVERY DAY
IF YOU CHECKED OFF ANY PROBLEMS ON THIS QUESTIONNAIRE, HOW DIFFICULT HAVE THESE PROBLEMS MADE IT FOR YOU TO DO YOUR WORK, TAKE CARE OF THINGS AT HOME, OR GET ALONG WITH OTHER PEOPLE: VERY DIFFICULT
3. WORRYING TOO MUCH ABOUT DIFFERENT THINGS: SEVERAL DAYS
8. IF YOU CHECKED OFF ANY PROBLEMS, HOW DIFFICULT HAVE THESE MADE IT FOR YOU TO DO YOUR WORK, TAKE CARE OF THINGS AT HOME, OR GET ALONG WITH OTHER PEOPLE?: VERY DIFFICULT
4. TROUBLE RELAXING: NOT AT ALL
1. FEELING NERVOUS, ANXIOUS, OR ON EDGE: MORE THAN HALF THE DAYS
4. TROUBLE RELAXING: NOT AT ALL
7. FEELING AFRAID AS IF SOMETHING AWFUL MIGHT HAPPEN: SEVERAL DAYS
7. FEELING AFRAID AS IF SOMETHING AWFUL MIGHT HAPPEN: SEVERAL DAYS
2. NOT BEING ABLE TO STOP OR CONTROL WORRYING: SEVERAL DAYS
GAD7 TOTAL SCORE: 8
IF YOU CHECKED OFF ANY PROBLEMS ON THIS QUESTIONNAIRE, HOW DIFFICULT HAVE THESE PROBLEMS MADE IT FOR YOU TO DO YOUR WORK, TAKE CARE OF THINGS AT HOME, OR GET ALONG WITH OTHER PEOPLE: VERY DIFFICULT
GAD7 TOTAL SCORE: 8
1. FEELING NERVOUS, ANXIOUS, OR ON EDGE: MORE THAN HALF THE DAYS
5. BEING SO RESTLESS THAT IT IS HARD TO SIT STILL: NOT AT ALL
GAD7 TOTAL SCORE: 8

## 2023-06-14 NOTE — PROGRESS NOTES
Assessment & Plan     CKD (chronic kidney disease) stage 4, GFR 15-29 ml/min (H)  -Patient has longstanding chronic kidney disease, in chart review I have placed an alternative nephrology referral in case she wanted a second opinion about care, she is currently doing well with her current clinic so discussed she does not need to follow-up with them.  Updating lab work as listed below.  - Albumin Random Urine Quantitative with Creat Ratio  - Basic metabolic panel  - Uric acid  - Albumin Random Urine Quantitative with Creat Ratio  - Basic metabolic panel  - Uric acid    Other migraine without status migrainosus, not intractable  -Discussed with the patient that it is reasonable to start Depakote again to see if this helps with her headaches.  Starting low-dose as listed below both secondary to her chronic kidney disease as well as her prior allergy.  She will watch for rash and follow-up with me in approximately 6 weeks for recheck.  Certainly if she feels that she is not having any ill effects we could consider dose increasing prior to that.  I did also intentionally pick a twice daily medication as it is listed as a lower co-pay in our computer system but discussed that a lower co-pay for 60 pills may still be more expensive than a higher co-pay for 30, she finds this is very unaffordable we could trial sending in the once daily Depakote as well.  - divalproex sodium delayed-release (DEPAKOTE) 250 MG DR tablet  Dispense: 60 tablet; Refill: 2    Chronic neck pain  -I think it is quite reasonable to send in the 4% patches for her, I will send the denial for the 5% also to the pharmacy for her.  - Lidocaine (LIDOCARE) 4 % Patch  Dispense: 60 patch; Refill: 5    Hypothyroidism, unspecified type  -Feels that she is doing well, has not had labs in approximately 1 year, updating lab work as listed  - TSH WITH FREE T4 REFLEX  - TSH WITH FREE T4 REFLEX    Sleep apnea, unspecified type  -Doing well with her appliance,  "referral for a closer sleep clinic placed for her.  - Adult Sleep Eval & Management  Referral    Arthritis of finger of right hand  -Weaning off of the Zonegran she does not feel its helping and is quite expensive.  Discussed that I would go a little bit slower than her intention was, small prescription provided for her.    - zonisamide (ZONEGRAN) 25 MG capsule  Dispense: 15 capsule; Refill: 0    Anemia in stage 4 chronic kidney disease (H)  -Discussed that they may consider doing EPO injections for her through nephrology, she will follow-up with them, hemoglobin checked today  - CBC with platelets  - CBC with platelets    Nasal congestion  -Renewal for as needed Astelin  - azelastine (ASTELIN) 0.1 % nasal spray  Dispense: 30 mL; Refill: 11    Herpes simplex virus infection  -Unfortunately what she felt was shingles was actually recurrent herpes, doing well with scheduled Valtrex as listed, refill provided for her today.  - valACYclovir (VALTREX) 500 MG tablet  Dispense: 90 tablet; Refill: 3           BMI:   Estimated body mass index is 26.7 kg/m  as calculated from the following:    Height as of 5/12/23: 1.575 m (5' 2\").    Weight as of this encounter: 66.2 kg (146 lb).   Weight management plan: Discussed healthy diet and exercise guidelines    42 minutes spent by me on the date of the encounter doing chart review, history and exam, documentation and further activities per the note      Caitlyn Rees MD  [facility]    Alicia Delgado is a 82 year old, presenting for the following health issues:  Recheck Medication        6/14/2023    12:46 PM   Additional Questions   Roomed by Charu     History of Present Illness     Asthma:  She presents for follow up of asthma.  She has some cough, some wheezing, and some shortness of breath. She is using a relief medication daily. She does not miss any doses of her controller medication throughout the week.Patient is aware of the " following triggers: cold air, emotions, pollens, smoke, strong odors and fumes and upper respiratory infections. The patient has had a visit to the Emergency Room, Urgent Care or Hospital due to asthma since the last clinic visit. She has been to the Emergency Room or Urgent Care 0 times.She has had a Hospitalization 0 times.    Back Pain:  She presents for follow up of back pain. Patient's back pain is a chronic problem.  Location of back pain:  Right lower back, left lower back, right side of neck, left side of neck, right shoulder, left shoulder, right buttock, left buttock, right hip, right side of waist and other  Description of back pain: burning, dull ache, gnawing and sharp  Back pain spreads: right buttocks, left buttocks, right thigh, left thigh, right knee, left knee, left foot, right shoulder, left shoulder and right side of neck    Since patient first noticed back pain, pain is: always present, but gets better and worse  Does back pain interfere with her job:  Not applicable      CKD: She is not using over the counter pain medicine.     COPD:  She presents for follow up of COPD.  Overall, COPD symptoms are stable since last visit. She has more than usual fatigue or shortness of breath with exertion and no shortness of breath at rest.She often coughs and does not have change in sputum. Patient has had recent fever. She can walk less than 1 block without stopping to rest. She can not walk a flight of stairs without resting. The patient has had no ED, urgent care, or hospital admissions because of COPD since the last visit.     Mental Health Follow-up:  Patient presents to follow-up on Depression & Anxiety.Patient's depression since last visit has been:  Worse  The patient is having other symptoms associated with depression.  Patient's anxiety since last visit has been:  Bad  The patient is having other symptoms associated with anxiety.  Any significant life events: grief or loss and health  concerns  Patient is feeling anxious or having panic attacks.  Patient has no concerns about alcohol or drug use.She consumes 0 sweetened beverage(s) daily. She exercises with enough effort to increase her heart rate 4 days per week.   She is taking medications regularly.  Today's KT-7 Score: 8       She did have an MRA done of her brain through neurology, was ok excet for arteriosclerosis.     She does already see associated nephrology in [place]. I had put a referral in for [facility] [place], does not think that she needs to have this now.     Her insurance is not paying for the 5% lidocaine patches but will pay for the 4%. She is hopeful that she can have a prescription for 4% for this. She does bring the denial in with her today.  She states that the 4% patches would be covered for her, and is requesting a prescription for these instead.    She does hope to have a sleep study. She cannot have one done at the [facility] as her daughter is no longer willing to drive her here, and she does not feel comfortable getting herself there. She does have an apparatus now and is hopeful that she can have a referral to a clinic in [place] to prove that her sleep apnea has improved with the apparatus.      She is no longer taking the Zonismide. She doesn't think that it has done anything for her pain, she has been struggling with her mental health lately.     Her head was frozen in position, looking to the left. She had the same problem many years when she had this as well.     She is seeing [facility] for her neck, they are doing injections and have recommended an RFA. She did have one with DR. Mittal and it was extraordinarily painful. The chemical felt like it was cutting her open in a knife.     [facility] has recommended that she consider Depakote to treat her migraines.  They had recommended Ubrelvy I think however this or what ever other injectable medication I recommended is quite unaffordable for her.  She is  wondering if she could try Depakote.  I told her that we have Depakote listed on her allergy list, and she states that this was many years ago and she would like to try it again.  Our listed allergy is a rash.    Nephrology told her that the kidney hormones are off. She was told that she has to watch it, in regards to her hemoglobin.     She does worry about gout as well. Her urea nitrogen is going up again.       Review of Systems   Per above      Objective    /62   Pulse 86   Temp 98.2  F (36.8  C)   Resp 14   Wt 66.2 kg (146 lb)   LMP  (LMP Unknown)   SpO2 97%   BMI 26.70 kg/m    Body mass index is 26.7 kg/m .  Physical Exam   GENERAL: healthy, alert and no distress  NECK: no adenopathy, no asymmetry, masses, or scars and thyroid normal to palpation  RESP: lungs clear to auscultation - no rales, rhonchi or wheezes  CV: regular rate and rhythm, normal S1 S2, no S3 or S4, no murmur, click or rub, no peripheral edema and peripheral pulses strong  MS: no gross musculoskeletal defects noted, no edema  SKIN: Healing lesion on the top of her head, this was removed through dermatology for a what I believe was basal cell cancer  PSYCH: tangential, anxious, judgement and insight intact and appearance well groomed

## 2023-06-15 ENCOUNTER — TELEPHONE (OUTPATIENT)
Dept: FAMILY MEDICINE | Facility: CLINIC | Age: 83
End: 2023-06-15
Payer: MEDICARE

## 2023-06-15 NOTE — TELEPHONE ENCOUNTER
Prior Authorization Request  Who s requesting:  Covermymeds  Pharmacy Name and Location: [facility] [place] [facility]  Medication Name: Lidocaine 4% patches  Insurance Plan: medicare and [facility]  Insurance Member ID Number:  [id number]  CoverMyMeds Key: [id number]  Informed patient that prior authorizations can take up to 10 business days for response:   No  Okay to leave a detailed message:

## 2023-06-16 NOTE — TELEPHONE ENCOUNTER
Patient was seen on 6/14 and addressed her issues then.     The troponin was elevated x 2 but stable, this would be consistent with her kidney failure.

## 2023-06-19 PROBLEM — G47.21 CIRCADIAN RHYTHM SLEEP DISORDER, DELAYED SLEEP PHASE TYPE: Status: ACTIVE | Noted: 2023-01-27

## 2023-06-19 PROBLEM — G47.9 SLEEP DISTURBANCE: Status: ACTIVE | Noted: 2022-12-16

## 2023-06-19 PROBLEM — Z90.49 ACQUIRED ABSENCE OF OTHER SPECIFIED PARTS OF DIGESTIVE TRACT: Status: RESOLVED | Noted: 2019-12-15 | Resolved: 2023-06-19

## 2023-06-19 PROBLEM — G47.33 OBSTRUCTIVE SLEEP APNEA (ADULT) (PEDIATRIC): Status: ACTIVE | Noted: 2023-01-12

## 2023-06-19 PROBLEM — K21.9 GASTROESOPHAGEAL REFLUX DISEASE WITHOUT ESOPHAGITIS: Status: RESOLVED | Noted: 2017-05-02 | Resolved: 2023-06-19

## 2023-06-19 PROBLEM — H93.A2 PULSATILE TINNITUS, LEFT EAR: Status: ACTIVE | Noted: 2023-03-23

## 2023-06-19 NOTE — TELEPHONE ENCOUNTER
Called and left voicemail reminding pt of right hip injection on 6/21  Asked that she arrive at 1345 for 1400 procedure and have a .  Asked that she call back if she is currently taking Eliquis, as her record states she is not taking it.

## 2023-06-20 NOTE — TELEPHONE ENCOUNTER
Central Prior Authorization Team   Phone: [phone]    PA Initiation    Medication:   Insurance Company: [facility] - Phone [phone] Fax [phone]  Pharmacy Filling the Rx: [facility] [id number]  [place], [place] - [address] [place] [address]  Filling Pharmacy Phone: [phone]  Filling Pharmacy Fax: [phone]  Start Date: 6/20/2023

## 2023-06-21 ENCOUNTER — RADIOLOGY INJECTION OFFICE VISIT (OUTPATIENT)
Dept: PALLIATIVE MEDICINE | Facility: OTHER | Age: 83
End: 2023-06-21
Payer: MEDICARE

## 2023-06-21 ENCOUNTER — TELEPHONE (OUTPATIENT)
Dept: PALLIATIVE MEDICINE | Facility: OTHER | Age: 83
End: 2023-06-21

## 2023-06-21 VITALS — DIASTOLIC BLOOD PRESSURE: 56 MMHG | OXYGEN SATURATION: 98 % | HEART RATE: 84 BPM | SYSTOLIC BLOOD PRESSURE: 112 MMHG

## 2023-06-21 DIAGNOSIS — M16.11 ARTHROPATHY OF RIGHT HIP: Primary | ICD-10-CM

## 2023-06-21 DIAGNOSIS — M19.90 OSTEOARTHRITIS: ICD-10-CM

## 2023-06-21 DIAGNOSIS — M19.90 OSTEOARTHRITIS: Primary | ICD-10-CM

## 2023-06-21 DIAGNOSIS — M47.812 CERVICAL SPONDYLOSIS WITHOUT MYELOPATHY: ICD-10-CM

## 2023-06-21 PROCEDURE — 255N000002 HC RX 255 OP 636: Performed by: STUDENT IN AN ORGANIZED HEALTH CARE EDUCATION/TRAINING PROGRAM

## 2023-06-21 PROCEDURE — 77002 NEEDLE LOCALIZATION BY XRAY: CPT | Mod: 26 | Performed by: STUDENT IN AN ORGANIZED HEALTH CARE EDUCATION/TRAINING PROGRAM

## 2023-06-21 PROCEDURE — 20610 DRAIN/INJ JOINT/BURSA W/O US: CPT | Mod: RT | Performed by: STUDENT IN AN ORGANIZED HEALTH CARE EDUCATION/TRAINING PROGRAM

## 2023-06-21 PROCEDURE — 77002 NEEDLE LOCALIZATION BY XRAY: CPT | Performed by: STUDENT IN AN ORGANIZED HEALTH CARE EDUCATION/TRAINING PROGRAM

## 2023-06-21 PROCEDURE — 250N000011 HC RX IP 250 OP 636: Mod: JW | Performed by: STUDENT IN AN ORGANIZED HEALTH CARE EDUCATION/TRAINING PROGRAM

## 2023-06-21 RX ORDER — TRIAMCINOLONE ACETONIDE 40 MG/ML
40 INJECTION, SUSPENSION INTRA-ARTICULAR; INTRAMUSCULAR ONCE
Status: COMPLETED | OUTPATIENT
Start: 2023-06-21 | End: 2023-06-21

## 2023-06-21 RX ORDER — ROPIVACAINE HYDROCHLORIDE 2 MG/ML
4 INJECTION, SOLUTION EPIDURAL; INFILTRATION; PERINEURAL ONCE
Status: COMPLETED | OUTPATIENT
Start: 2023-06-21 | End: 2023-06-21

## 2023-06-21 RX ADMIN — TRIAMCINOLONE ACETONIDE 40 MG: 40 INJECTION, SUSPENSION INTRA-ARTICULAR; INTRAMUSCULAR at 14:21

## 2023-06-21 RX ADMIN — IOHEXOL 10 ML: 180 INJECTION INTRAVENOUS at 14:08

## 2023-06-21 RX ADMIN — ROPIVACAINE HYDROCHLORIDE 4 ML: 2 INJECTION EPIDURAL; INFILTRATION; PERINEURAL at 14:20

## 2023-06-21 ASSESSMENT — PAIN SCALES - GENERAL
PAINLEVEL: MILD PAIN (3)
PAINLEVEL: MODERATE PAIN (5)

## 2023-06-21 NOTE — PROGRESS NOTES
Pre procedure Diagnosis: Right hip arthropathy   Post procedure Diagnosis: Same  Procedure performed: Right hip injection, CPT codes 63154 and 47706-50  Anesthesia: Local  Complications: None  Operators: Ronnie Gil MD    Indications:   Sandy Spencer is a 82 year old female was sent by myself for right hip arthropathy    Options/alternatives, benefits and risks were discussed with the patient including bleeding, infection, tissue trauma, exposure to radiation, reaction to medications including seizure, nerve injury, weakness, and numbness.  Questions were answered to her satisfaction and she agrees to proceed. Voluntary informed consent was obtained and signed.     Vitals were reviewed: Yes  Allergies were reviewed:  Yes   Medications were reviewed:  Yes   Pre-procedure pain score: 5/10    Procedure:  After obtaining informed consent, patient was brought into the procedure suite and was placed in a supine position on the procedure table.   A Pause for Cause was performed.  Patient was prepped and draped in the usual sterile fashion.    After identifying the right hip joints, a total of 4ml of Lidocaine 1% was used to anesthetize the skin at a skin entry site. An AP approach was taken for the injection. A 22gauge 5inch spinal needle was advanced under intermittent fluoroscopy until it was felt to enter the hip joint.    A total of 1.0ml of Omnipaque-180 was injected, showing appropriate location, with spread into the intraarticular space and no intravascular uptake noted.    A mixture containing 4ml of 0.2% ropivacaine with 40mg of Kenalog was injected. The needle was removed.     Hemostasis was achieved, the area was cleaned, and bandaids were placed when appropriate.  The patient tolerated the procedure well, and was taken to the recovery room.    Images were saved to PACS.    Post-procedure pain score: 3/10  Follow-up includes:   -f/u with referring provider    Ronnie Gil MD  Interventional Pain  Medicine  [facility]

## 2023-06-21 NOTE — PROGRESS NOTES
Pre-procedure Intake  If YES to any questions or NO to having a   Please complete laminated checklist and leave on the computer keyboard for Provider, verbally inform provider if able.    For SCS Trial, RFA's or any sedation procedure:  Have you been fasting? NA    If yes, for how long? na    Are you taking any any blood thinners such as Coumadin, Warfarin, Jantoven, Pradaxa Xarelto, Eliquis, Edoxaban, Enoxaparin, Lovenox, Heparin, Arixtra, Fondaparinux, or Fragmin? OR Antiplatelet medication such as Plavix, Brilinta, or Effient?   No     If yes, when did you take your last dose? no    Do you take aspirin?  No    If cervical procedure, have you held aspirin for 6 days?   NA    Do you have any allergies to contrast dye, iodine, steroid and/or numbing medications?  Not allergic but has 1 kidney dr said not to use     Are you currently taking antibiotics or have an active infection?  NO    Have you had a fever/elevated temperature within the past week? NO    Are you currently taking oral steroids? NO    Do you have a ? Yes    Are you pregnant or breastfeeding?  NO    Have you received the COVID-19 vaccine? No    If yes, was it your 1st, 2nd or only dose needed? No     Date of most recent vaccine: na     Notify provider and RNs if systolic BP >170, diastolic BP >100, P >100 or O2 sats < 90%

## 2023-06-21 NOTE — TELEPHONE ENCOUNTER
PRIOR AUTHORIZATION DENIED    Medication: Lidocaine - DENIED    Denial Date: 6/20/2023    Denial Rational: INSURANCE STATES MEDICATION IS EXCLUDED FROM COVERAGE.  MEDICARE PART D DOES NOT COVER OTC MEDICATIONS.        Appeal Information:  IF YOU WOULD LIKE TO APPEAL PLEASE SUPPLY PA TEAM WITH A LETTER OF MEDICAL NECESSITY WITH CLINICAL REASON.

## 2023-06-21 NOTE — PATIENT INSTRUCTIONS
[facility] [place]   Procedure Discharge Instructions    Today you saw:   Dr. Gil    You had an:  right hip injection      Medications used:  Lidocaine   Omnipaque  Ropivicaine   Kenalog         If you have received sedation before, during, or after your procedure, for the next 24 hours you shall NOT:   -Drive  -Operate machinery  -Drink alcohol  -Sign any legal documents      If you were holding your blood thinning medication, please restart taking it: N/A  Be cautious when walking. Numbness and/or weakness in the lower extremities may occur for up to 6-8 hours after the procedure due to effect of the local anesthetic  Do not drive for 6 hours. The effect of the local anesthetic could slow your reflexes.   You may resume your regular activities after 24 hours  Avoid strenuous activity for the first 24 hours  You may shower, however avoid swimming, tub baths or hot tubs for 24 hours following your procedure  You may have a mild to moderate increase in pain for several days following the injection.  It may take up to 14 days for the steroid medication to start working although you may feel the effect as early as a few days after the procedure.     You may use ice packs for 10-15 minutes, 3 to 4 times a day at the injection site for comfort  Do not use heat to painful areas for 6 to 8 hours. This will give the local anesthetic time to wear off and prevent you from accidentally burning your skin.   Unless you have been directed to avoid the use of anti-inflammatory medications (NSAIDS), you may use medications such as ibuprofen, Aleve or Tylenol for pain control if needed.   If you were fasting, you may resume your normal diet and medications after the procedure  If you have diabetes, check your blood sugar more frequently than usual as your blood sugar may be higher than normal for 10-14 days following a steroid injection. Contact your doctor who manages your diabetes if your blood  sugar is higher than usual  Possible side effects of steroids that you may experience include flushing, elevated blood pressure, increased appetite, mild headaches and restlessness.  All of these symptoms will get better with time.  If you experience any of the following, call the Pain Clinic at [phone]:  -Fever over 100 degree F  -Swelling, bleeding, redness, drainage, warmth at the injection site  -Progressive weakness or numbness in your legs or arms  -Loss of bowel or bladder function  -Unusual headache that is not relieved by Tylenol or other pain reliever  -Unusual new onset of pain that is not improving

## 2023-06-22 ENCOUNTER — TELEPHONE (OUTPATIENT)
Dept: FAMILY MEDICINE | Facility: CLINIC | Age: 83
End: 2023-06-22
Payer: MEDICARE

## 2023-06-22 NOTE — TELEPHONE ENCOUNTER
General Call      Reason for Call:  Gout meds    What are your questions or concerns:  Patient got a MyChart from pcp about gout meds.   Patient states that she would like her to send the allopurinol over like suggested.  She would like this asap before pcp is off for the weekend.  Please advise.    Pharmacy: [id number]    Date of last appointment with provider: 06/14/23    Could we send this information to you in [employer] or would you prefer to receive a phone call?:   Patient would prefer a phone call     Okay to leave a detailed message?: Yes at Cell number on file:    Telephone Information:   Mobile [phone]

## 2023-06-22 NOTE — TELEPHONE ENCOUNTER
Please let the patient know that we were unable to get the OTC patches covered as well. I did hear from another patient that they are much cheaper at [employer] or [place] I think if she knows someone that can bring her there to get some.

## 2023-06-23 ENCOUNTER — OFFICE VISIT (OUTPATIENT)
Dept: PALLIATIVE MEDICINE | Facility: OTHER | Age: 83
End: 2023-06-23
Payer: MEDICARE

## 2023-06-23 VITALS
SYSTOLIC BLOOD PRESSURE: 135 MMHG | BODY MASS INDEX: 27.07 KG/M2 | HEART RATE: 80 BPM | OXYGEN SATURATION: 97 % | WEIGHT: 148 LBS | DIASTOLIC BLOOD PRESSURE: 61 MMHG

## 2023-06-23 DIAGNOSIS — M1A.39X0 CHRONIC GOUT DUE TO RENAL IMPAIRMENT OF MULTIPLE SITES WITHOUT TOPHUS: ICD-10-CM

## 2023-06-23 DIAGNOSIS — Z79.899 ENCOUNTER FOR LONG-TERM (CURRENT) USE OF MEDICATIONS: Primary | ICD-10-CM

## 2023-06-23 DIAGNOSIS — S06.0X9D CONCUSSION WITH LOSS OF CONSCIOUSNESS, SUBSEQUENT ENCOUNTER: ICD-10-CM

## 2023-06-23 DIAGNOSIS — M79.604 LEG PAIN, BILATERAL: ICD-10-CM

## 2023-06-23 DIAGNOSIS — M79.605 LEG PAIN, BILATERAL: ICD-10-CM

## 2023-06-23 DIAGNOSIS — G44.021 INTRACTABLE CHRONIC CLUSTER HEADACHE: ICD-10-CM

## 2023-06-23 LAB
CANNABINOIDS UR QL SCN: NORMAL
CREAT UR-MCNC: 12 MG/DL
ETHANOL UR QL SCN: NORMAL

## 2023-06-23 PROCEDURE — 80346 BENZODIAZEPINES1-12: CPT | Performed by: ANESTHESIOLOGY

## 2023-06-23 PROCEDURE — 80354 DRUG SCREENING FENTANYL: CPT | Performed by: ANESTHESIOLOGY

## 2023-06-23 PROCEDURE — 80363 OPIOIDS & OPIATE ANALOGS 3/4: CPT | Performed by: ANESTHESIOLOGY

## 2023-06-23 PROCEDURE — 80320 DRUG SCREEN QUANTALCOHOLS: CPT | Performed by: ANESTHESIOLOGY

## 2023-06-23 PROCEDURE — 80307 DRUG TEST PRSMV CHEM ANLYZR: CPT | Performed by: ANESTHESIOLOGY

## 2023-06-23 PROCEDURE — 99215 OFFICE O/P EST HI 40 MIN: CPT | Performed by: ANESTHESIOLOGY

## 2023-06-23 PROCEDURE — G0463 HOSPITAL OUTPT CLINIC VISIT: HCPCS

## 2023-06-23 RX ORDER — ALLOPURINOL 100 MG/1
100 TABLET ORAL 2 TIMES DAILY
Qty: 60 TABLET | Refills: 3 | Status: SHIPPED | OUTPATIENT
Start: 2023-06-23 | End: 2023-11-24

## 2023-06-23 RX ORDER — KETAMINE HCL 100 %
POWDER (GRAM) MISCELLANEOUS
Qty: 30 G | Refills: 1 | Status: SHIPPED | OUTPATIENT
Start: 2023-06-23 | End: 2023-08-02

## 2023-06-23 RX ORDER — METHYLPHENIDATE HYDROCHLORIDE 10 MG/1
15 TABLET ORAL 2 TIMES DAILY
Qty: 90 TABLET | Refills: 0 | Status: SHIPPED | OUTPATIENT
Start: 2023-06-23 | End: 2023-08-02

## 2023-06-23 ASSESSMENT — PAIN SCALES - GENERAL: PAINLEVEL: MODERATE PAIN (4)

## 2023-06-23 NOTE — PROGRESS NOTES
Patient presents to the clinic today for a follow up with ARELIS FITZPATRICK MD regarding Pain Management.          4/21/2023     2:49 PM 4/21/2023     2:50 PM 6/23/2023    12:48 PM   PEG Score   PEG Total Score 7 7 6.33       UDS/CSA- 05.04.2022    Medications- adderall    Ultram (tramadol) Not taking         QUESTIONS:    Mylene CONCEPC[facility] Visit Facilitator

## 2023-06-23 NOTE — PROGRESS NOTES
[facility] - Office Visit    ASSESSMENT & PLAN     Sandy was seen today for pain.    Diagnoses and all orders for this visit:    Chronic gout due to renal impairment of multiple sites without tophus  -     allopurinol (ZYLOPRIM) 100 MG tablet; Take 1 tablet (100 mg) by mouth 2 times daily    Intractable chronic cluster headache  -     Lidocaine Base POWD; 1 spray 3 times daily as needed 4% topical solution in 30 ml nasal spray bottle  -     ketamine HCl POWD; 5% nasal spray one spray each nostril every six hours as needed, 30 ml bottle    Concussion with loss of consciousness, subsequent encounter  -     methylphenidate (RITALIN) 10 MG tablet; Take 1.5 tablets (15 mg) by mouth 2 times daily    Leg pain, bilateral  -     PAIN INJECTION EVAL/TREAT/FOLLOW UP; Future        Patient Instructions     [facility] Number:  [phone]    Call with any questions about your care and for scheduling assistance.     Calls are returned Monday through Friday between 8 AM and 4:30 PM. We usually get back to you within 2 business days depending on the issue/request.    If we are prescribing your medications:    For opioid medication refills, call the clinic or send a [facility] message 7 days in advance.  Please include:    Name of requested medication    Name of the pharmacy.    For non-opioid medications, call your pharmacy directly to request a refill. Please allow 3-4 days to be processed.     Per [place] State Law:    All controlled substance prescriptions must be filled within 30 days of being written.      For those controlled substances allowing refills, pickup must occur within 30 days of last fill.      We believe regular attendance is key to your success in our program!      Any time you are unable to keep your appointment we ask that you call us at least 24 hours in advance to cancel.This will allow us to offer the appointment time to another patient.      Multiple missed appointments may lead to dismissal from the clinic.     PLAN:    Lidocaine nasal spray for headaches, reviewed will tip your head to the right side, spray toward the middle part of your nose.  May do every 6 hours as needed for headaches.  Sent to [place] pharmacy.  Ketamine nasal spray 5%, 1 spray each nostril every 6 hours as needed for other pains sent to the [place] pharmacy.    Allopurinol 100 mg twice a day for elevated uric acid and gout.    Ritalin 10 mg 1-1/2 tablet morning and noon to help with ADD symptoms.    You may continue to use delta 9 products few your stepsons resources.    Order placed for injections for your knee with Dr. Gil.  You will also review with him consideration of a left hip bursitis injection.    Follow-up with Gerardo Flores, [facility] pharmacist in 10 to 14 days and with Dr. Fitzpatrick in 5 to 6 weeks        -----  ARELIS FITZPATRICK MD  [facility]       SUBJECTIVE      Sandy Spencer is a 82 year old year old female who presents to clinic today for the following:     Seen for chronic overlapping pain conditions including chronic regional pain syndrome, migraine headaches.    She did have a right hip joint injection 6/21.    Saw her primary care provider, started on Depakote for migraines.    She reviews today since last seen started on tramadol which did not help.    She did have laboratory showing her uric acid was elevated again at 8.  She has been on allopurinol in the past.  Appears this was ordered by Dr. Torres but he did not have a chance to discuss.  She would like to resume.    She is going to the [place] [facility] regarding headaches.  They discussed she should have what sounds a cervical medial branch blocks and radiofrequency ablation.  She is concerned as she finds them very painful, when in [place] have those done twice with sedation, lasted 1 year and a half.    She has been going to [facility] describes the medications they recommended  for migraines were all very expensive and not covered.  She has gone back to Depakote again with Dr. Rees.    Describes the headaches tend to be on the left side.  Notes she had a fractured skull and a broken nose when she was younger.  Has never tried lidocaine nasal spray.  We discussed this.    She has not yet had benefit from the right hip injection.  She thinks she needs knee injections again and left hip bursa injection.    The gout seems to be bothering her right knee and right ankle possibly her left elbow.    She notes the Adderall 20 mg twice a day does not seem to help the ADD.  She has been on Ritalin previously.  Discussed since her concussion she may have different needs to help also with focus and will go back to the Ritalin on a higher dose.    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    She describes a frustration her daughter and told her to throw away the ketamine as it was a horse tranquilizer and did not want her on fentanyl.  The daughter describes having a friend who has done well with ketamine shots and wonders if she can get them.  Reviewed the use of ketamine nasal spray and she would be interested in trying.    She has tried some delta 9 and delta 8 through a son-in-law's store and discussed she can continue to do that.    She would like to return to physical therapy with somebody that did Kinesiotape previously.        She has been getting lidocaine 4% patches through a pharmacy plan which helps her low back.  She continues with some sciatica.    Urine drug testing to be updated.   reviewed      Current Outpatient Medications:      albuterol (PROAIR HFA/PROVENTIL HFA/VENTOLIN HFA) 108 (90 Base) MCG/ACT inhaler, Inhale 2 puffs into the lungs every 6 hours, Disp: 18 g, Rfl: 4     allopurinol (ZYLOPRIM) 100 MG tablet, Take 1 tablet (100 mg) by mouth 2 times daily, Disp: 60 tablet, Rfl: 3     apixaban  ANTICOAGULANT (ELIQUIS) 5 MG tablet, Take 1 tablet (5 mg) by mouth 2 times daily, Disp: 180 tablet, Rfl: 3     apixaban ANTICOAGULANT (ELIQUIS) 5 MG tablet, Take 1 tablet (5 mg) by mouth 2 times daily, Disp: 180 tablet, Rfl: 3     azelastine (ASTELIN) 0.1 % nasal spray, 2 sprays each nostril 1-2x daily as needed for nasal congestion (use nightly for first 2 week), Disp: 30 mL, Rfl: 11     Cholecalciferol (VITAMIN D-3) 125 MCG (5000 UT) TABS, Take 5,000 Units by mouth daily, Disp: , Rfl:      divalproex sodium delayed-release (DEPAKOTE) 250 MG DR tablet, Take 1 tablet (250 mg) by mouth 2 times daily, Disp: 60 tablet, Rfl: 2     ipratropium - albuterol 0.5 mg/2.5 mg/3 mL (DUONEB) 0.5-2.5 (3) MG/3ML neb solution, Take 1 vial by nebulization every 6 hours as needed for shortness of breath, wheezing or cough, Disp: , Rfl:      ketamine HCl POWD, 5% nasal spray one spray each nostril every six hours as needed, 30 ml bottle, Disp: 30 g, Rfl: 1     levETIRAcetam (KEPPRA) 250 MG tablet, Take 1 tablet (250 mg) by mouth 4 times daily, Disp: 360 tablet, Rfl: 1     levothyroxine (SYNTHROID/LEVOTHROID) 50 MCG tablet, Take 1 tablet (50 mcg) by mouth daily, Disp: 90 tablet, Rfl: 3     Lidocaine (LIDOCARE) 4 % Patch, Place 2 patches onto the skin every 24 hours To prevent lidocaine toxicity, patient should be patch free for 12 hrs daily., Disp: 60 patch, Rfl: 5     Lidocaine (LIDOCARE) 4 % Patch, Place 1 patch onto the skin every 24 hours To prevent lidocaine toxicity, patient should be patch free for 12 hrs daily., Disp: 15 patch, Rfl: 0     Lidocaine Base POWD, 1 spray 3 times daily as needed 4% topical solution in 30 ml nasal spray bottle, Disp: 30 g, Rfl: 3     methocarbamol (ROBAXIN) 500 MG tablet, Take 1 tablet (500 mg) by mouth 2 times daily as needed for muscle spasms, Disp: 56 tablet, Rfl: 0     methylphenidate (RITALIN) 10 MG tablet, Take 1.5 tablets (15 mg) by mouth 2 times daily, Disp: 90 tablet, Rfl: 0     rosuvastatin  (CRESTOR) 40 MG tablet, Take 1 tablet (40 mg) by mouth daily, Disp: 90 tablet, Rfl: 3     traZODone (DESYREL) 100 MG tablet, Take 4 tablets (400 mg) by mouth At Bedtime, Disp: 360 tablet, Rfl: 1     valACYclovir (VALTREX) 500 MG tablet, Take 1 tablet (500 mg) by mouth daily, Disp: 90 tablet, Rfl: 3     VALACYCLOVIR HCL PO, , Disp: , Rfl:      venlafaxine (EFFEXOR XR) 150 MG 24 hr capsule, Take 1 capsule (150 mg) by mouth daily, Disp: 90 capsule, Rfl: 1     zonisamide (ZONEGRAN) 25 MG capsule, Take 1 capsule (25 mg) by mouth At Bedtime, Disp: 15 capsule, Rfl: 0    Current Facility-Administered Medications:      denosumab (PROLIA) injection 60 mg, 60 mg, Subcutaneous, Q6 Months, Caroline Sumner, NP, 60 mg at 01/25/23 1359      Review of Systems   General, psych, musculoskeletal, bowels and bladder otherwise normal other than above.          OBJECTIVE   /61   Pulse 80   Wt 67.1 kg (148 lb)   LMP  (LMP Unknown)   SpO2 97%   BMI 27.07 kg/m         Physical Exam  General: Alert, clear sensorium.  Appropriately groomed.  No respiratory distress no pain behavior.  Cardiovascular: Normal rate  Lungs: Pulmonary effort is normal, speaking in full sentences  MSK:  Skin: Warm and dry. No concerning rashes or lesions.  Neurologic: No focal deficit, alert and oriented x3  Psychiatric: Constricted affect.  Frustrated.    Chronic overlapping pain conditions with migraine headaches, which may have a cervicogenic a musculoskeletal component.  Complex regional pain syndrome has not been as active the condition.  Having more arthralgias.    Gout has been an element possibly related given the acute renal changes.    Plan as above.    Total time 48 minutes      Total time spent:  minutes

## 2023-06-23 NOTE — PATIENT INSTRUCTIONS
[facility] Number:  [phone]  Call with any questions about your care and for scheduling assistance.   Calls are returned Monday through Friday between 8 AM and 4:30 PM. We usually get back to you within 2 business days depending on the issue/request.    If we are prescribing your medications:  For opioid medication refills, call the clinic or send a [employer] [facility] message 7 days in advance.  Please include:  Name of requested medication  Name of the pharmacy.  For non-opioid medications, call your pharmacy directly to request a refill. Please allow 3-4 days to be processed.   Per [place] State Law:  All controlled substance prescriptions must be filled within 30 days of being written.    For those controlled substances allowing refills, pickup must occur within 30 days of last fill.      We believe regular attendance is key to your success in our program!    Any time you are unable to keep your appointment we ask that you call us at least 24 hours in advance to cancel.This will allow us to offer the appointment time to another patient.   Multiple missed appointments may lead to dismissal from the clinic.     PLAN:    Lidocaine nasal spray for headaches, reviewed will tip your head to the right side, spray toward the middle part of your nose.  May do every 6 hours as needed for headaches.  Sent to [place] pharmacy.  Ketamine nasal spray 5%, 1 spray each nostril every 6 hours as needed for other pains sent to the [place] pharmacy.    Allopurinol 100 mg twice a day for elevated uric acid and gout.    Ritalin 10 mg 1-1/2 tablet morning and noon to help with ADD symptoms.    You may continue to use delta 9 products few your stepsons resources.    Order placed for injections for your knee with Dr. Gil.  You will also review with him consideration of a left hip bursitis injection.    Referral made to physical therapy for some other does Kinesiotape which she had in the  past and found helpful    Follow-up with NANC[facility] Cole pharmacist in 10 to 14 days and with Dr. Lynn in 5 to 6 weeks

## 2023-06-26 ENCOUNTER — TELEPHONE (OUTPATIENT)
Dept: PALLIATIVE MEDICINE | Facility: OTHER | Age: 83
End: 2023-06-26
Payer: MEDICARE

## 2023-06-26 NOTE — TELEPHONE ENCOUNTER
Pre-screening Questions for Shoulder/Knee Injections:      Location:    [place]:     Only schedule on Wednesdays (ultrasound machine NOT available Tuesday and Thursday)    If need to double book, use the 3:30 pm slot     [place]:  Ultrasound appointments NOT available at this time    Does patient have an active infection or treated for one within the past week? No  (If YES, do NOT schedule and route to RN)     Is patient currently taking any antibiotics?  No  (If YES, do NOT schedule and route to RN)  For patients on chronic, preventative or prophylactic antibiotics, procedures can be scheduled.    Stanislaw Cho Snitzer-antibiotic course must have been completed for 4 days    Is patient taking any aspirin products? No     No need to hold non-aspirin anticoagulants.     If taking > 325mg/day of aspirin, limit aspirin to 81-325mg/day x 1 week.     No hold required day of procedure.    Has the patient had a flu shot or any other vaccinations within 7 days before or after the procedure.  No     Have you recently had a COVID vaccine or have plans to get it in the near future? No    If yes, explain that for the vaccine to work best they need to:       wait 1 week before and 1 week after getting Vaccine #1    wait 1 week before and 2 weeks after getting Vaccine #2    If patient has concerns about the timing, send to RN pool

## 2023-06-27 LAB
AMPHET UR CFM-MCNC: 2100 NG/ML
AMPHET/CREAT UR: ABNORMAL NG/MG {CREAT}

## 2023-06-30 ENCOUNTER — TELEPHONE (OUTPATIENT)
Dept: PALLIATIVE MEDICINE | Facility: OTHER | Age: 83
End: 2023-06-30
Payer: MEDICARE

## 2023-06-30 DIAGNOSIS — M47.812 SPONDYLOSIS OF CERVICAL REGION WITHOUT MYELOPATHY OR RADICULOPATHY: Primary | ICD-10-CM

## 2023-07-05 ENCOUNTER — TELEPHONE (OUTPATIENT)
Dept: PALLIATIVE MEDICINE | Facility: OTHER | Age: 83
End: 2023-07-05
Payer: MEDICARE

## 2023-07-05 ENCOUNTER — RADIOLOGY INJECTION OFFICE VISIT (OUTPATIENT)
Dept: PALLIATIVE MEDICINE | Facility: OTHER | Age: 83
End: 2023-07-05
Payer: MEDICARE

## 2023-07-05 VITALS — OXYGEN SATURATION: 97 % | HEART RATE: 90 BPM | DIASTOLIC BLOOD PRESSURE: 60 MMHG | SYSTOLIC BLOOD PRESSURE: 96 MMHG

## 2023-07-05 DIAGNOSIS — M17.0 OSTEOARTHRITIS OF BOTH KNEES, UNSPECIFIED OSTEOARTHRITIS TYPE: Primary | ICD-10-CM

## 2023-07-05 DIAGNOSIS — M54.81 OCCIPITAL NEURALGIA: Primary | ICD-10-CM

## 2023-07-05 DIAGNOSIS — M79.604 LEG PAIN, BILATERAL: ICD-10-CM

## 2023-07-05 DIAGNOSIS — M79.605 LEG PAIN, BILATERAL: ICD-10-CM

## 2023-07-05 DIAGNOSIS — M79.18 MYOFASCIAL PAIN SYNDROME: ICD-10-CM

## 2023-07-05 DIAGNOSIS — M16.12 ARTHROPATHY OF LEFT HIP: ICD-10-CM

## 2023-07-05 DIAGNOSIS — M47.812 SPONDYLOSIS OF CERVICAL REGION WITHOUT MYELOPATHY OR RADICULOPATHY: ICD-10-CM

## 2023-07-05 DIAGNOSIS — M17.0 OSTEOARTHRITIS OF BOTH KNEES, UNSPECIFIED OSTEOARTHRITIS TYPE: ICD-10-CM

## 2023-07-05 DIAGNOSIS — M47.812 CERVICAL SPONDYLOSIS WITHOUT MYELOPATHY: ICD-10-CM

## 2023-07-05 PROCEDURE — 255N000002 HC RX 255 OP 636: Performed by: STUDENT IN AN ORGANIZED HEALTH CARE EDUCATION/TRAINING PROGRAM

## 2023-07-05 PROCEDURE — 77002 NEEDLE LOCALIZATION BY XRAY: CPT | Performed by: STUDENT IN AN ORGANIZED HEALTH CARE EDUCATION/TRAINING PROGRAM

## 2023-07-05 PROCEDURE — 77002 NEEDLE LOCALIZATION BY XRAY: CPT | Mod: 26 | Performed by: STUDENT IN AN ORGANIZED HEALTH CARE EDUCATION/TRAINING PROGRAM

## 2023-07-05 PROCEDURE — 20610 DRAIN/INJ JOINT/BURSA W/O US: CPT | Mod: 50 | Performed by: STUDENT IN AN ORGANIZED HEALTH CARE EDUCATION/TRAINING PROGRAM

## 2023-07-05 PROCEDURE — 250N000011 HC RX IP 250 OP 636: Mod: JW | Performed by: STUDENT IN AN ORGANIZED HEALTH CARE EDUCATION/TRAINING PROGRAM

## 2023-07-05 RX ORDER — ROPIVACAINE HYDROCHLORIDE 2 MG/ML
9 INJECTION, SOLUTION EPIDURAL; INFILTRATION; PERINEURAL ONCE
Status: COMPLETED | OUTPATIENT
Start: 2023-07-05 | End: 2023-07-05

## 2023-07-05 RX ORDER — TRIAMCINOLONE ACETONIDE 40 MG/ML
40 INJECTION, SUSPENSION INTRA-ARTICULAR; INTRAMUSCULAR ONCE
Status: COMPLETED | OUTPATIENT
Start: 2023-07-05 | End: 2023-07-05

## 2023-07-05 RX ADMIN — IOHEXOL 10 ML: 180 INJECTION INTRAVENOUS at 13:25

## 2023-07-05 RX ADMIN — TRIAMCINOLONE ACETONIDE 40 MG: 40 INJECTION, SUSPENSION INTRA-ARTICULAR; INTRAMUSCULAR at 14:19

## 2023-07-05 RX ADMIN — ROPIVACAINE HYDROCHLORIDE 9 ML: 2 INJECTION, SOLUTION EPIDURAL; INFILTRATION; PERINEURAL at 14:19

## 2023-07-05 NOTE — PROGRESS NOTES
Pre procedure Diagnosis: Bilateral knee arthropathy  Post procedure Diagnosis: Same  Procedure performed: Bilateral knee injection, CPT code 72318-22 and 72727  Anesthesia: none  Complications: none  Operators: Ronnie Gil MD    Indications:   Sandy Spencer is a 82 year old female was sent by Dr. Lynn for bilateral knee injections.      Options/alternatives, benefits and risks were discussed with the patient including bleeding, infection, tissue trauma, exposure to radiation, reaction to medications including seizure, nerve injury, weakness, and numbness.  Questions were answered to her  satisfaction and she agrees to proceed. Voluntary informed consent was obtained and signed.     Vitals were reviewed: Yes  Allergies were reviewed:  Yes   Medications were reviewed:  Yes   Pre-procedure pain score: see flowsheet/10    Procedure:  After getting informed consent, patient was brought into the procedure suite and was placed in a supine position on the procedure table.   A Pause for the Cause was performed.  Patient was prepped and draped in sterile fashion.     After identifying the bilateral knee joints, a 25 gauge 1.5 inch needle was advanced under intermittent fluoroscopy until it was felt to enter the knee joint.  A total of 1.0ml of Omnipaque-180 was injected, showing appropriate location, with spread into the intraarticular space and no intravascular uptake noted.  A mixture containing, 4ml of 0.2% ropivacaine with 40mg of kenalog was injected into both knees for a total of 8ml of 0.2% ropivacaine and 80mg kenalog. The needle was removed.     Hemostasis was achieved, the area was cleaned, and bandaids were placed when appropriate.  The patient tolerated the procedure well, and was taken to the recovery room.    Images were saved to PACS.    Post-procedure pain score: see flowsheet/10  Follow-up includes:   -f/u with referring provider    Ronnie Gil MD  Interventional Pain Medicine  [facility]  [facility]

## 2023-07-05 NOTE — PATIENT INSTRUCTIONS
[facility]  Post Procedure Instructions    Today you saw:  Dr. Gil    Today you had: Bilateral Knee Joint Injection                            Medications used:  lidocaine   Ropivicaine   kenolog   Omnipaque      Monitor the injection sites for signs and symptoms of infection-fever, chills, redness, swelling, warmth, or drainage to areas.  You may have soreness at injection sites for up to 24 hours.  It may take up to 14 days for the steroid medication to start working although you may feel the effect as early as a few days after the procedure.     You may apply ice to the painful areas to help minimize the discomfort of the needle pokes.  Do not apply heat to sites for at least 12 hours.  You may use anti-inflammatory medications or Tylenol for pain control if necessary    Pain Clinic phone number:  [phone]

## 2023-07-05 NOTE — PROGRESS NOTES
Pre-procedure Intake  If YES to any questions or NO to having a   Please complete laminated checklist and leave on the computer keyboard for Provider, verbally inform provider if able.    For SCS Trial, RFA's or any sedation procedure:  Have you been fasting? NA    If yes, for how long? na    Are you taking any any blood thinners such as Coumadin, Warfarin, Jantoven, Pradaxa Xarelto, Eliquis, Edoxaban, Enoxaparin, Lovenox, Heparin, Arixtra, Fondaparinux, or Fragmin? OR Antiplatelet medication such as Plavix, Brilinta, or Effient?  Eliquis     If yes, when did you take your last dose?     Do you take aspirin?  No    If cervical procedure, have you held aspirin for 6 days?   NA    Do you have any allergies to contrast dye, iodine, steroid and/or numbing medications? Should not have contrast dye because of the kidney.    Are you currently taking antibiotics or have an active infection?  YES: valacyclovir    Have you had a fever/elevated temperature within the past week? NO    Are you currently taking oral steroids? NO    Do you have a ? Yes    Are you pregnant or breastfeeding?  NO    Have you received the COVID-19 vaccine? No     If yes, was it your 1st, 2nd or only dose needed? na    Date of most recent vaccine: na    Notify provider and RNs if systolic BP >170, diastolic BP >100, P >100 or O2 sats < 90%

## 2023-07-07 ASSESSMENT — PAIN SCALES - PAIN ENJOYMENT GENERAL ACTIVITY SCALE (PEG)
PEG_TOTALSCORE: 6.67
INTERFERED_GENERAL_ACTIVITY: 8
INTERFERED_ENJOYMENT_LIFE: 8
AVG_PAIN_PASTWEEK: 4

## 2023-07-11 ENCOUNTER — TRANSFERRED RECORDS (OUTPATIENT)
Dept: HEALTH INFORMATION MANAGEMENT | Facility: CLINIC | Age: 83
End: 2023-07-11
Payer: MEDICARE

## 2023-07-11 LAB
CREATININE (EXTERNAL): 1.71 MG/DL (ref 0.6–0.95)
GFR ESTIMATED (EXTERNAL): 30 ML/MIN/1.73M2
GLUCOSE (EXTERNAL): 106 MG/DL (ref 65–99)
POTASSIUM (EXTERNAL): 4 MMOL/L (ref 3.5–5.3)

## 2023-07-12 ENCOUNTER — OFFICE VISIT (OUTPATIENT)
Dept: PALLIATIVE MEDICINE | Facility: OTHER | Age: 83
End: 2023-07-12
Attending: ANESTHESIOLOGY
Payer: MEDICARE

## 2023-07-12 VITALS — DIASTOLIC BLOOD PRESSURE: 82 MMHG | HEART RATE: 118 BPM | OXYGEN SATURATION: 94 % | SYSTOLIC BLOOD PRESSURE: 143 MMHG

## 2023-07-12 DIAGNOSIS — M79.18 MYOFASCIAL PAIN SYNDROME: ICD-10-CM

## 2023-07-12 DIAGNOSIS — M54.81 BILATERAL OCCIPITAL NEURALGIA: Primary | ICD-10-CM

## 2023-07-12 PROCEDURE — 64405 NJX AA&/STRD GR OCPL NRV: CPT | Mod: 50

## 2023-07-12 PROCEDURE — 64405 NJX AA&/STRD GR OCPL NRV: CPT | Mod: 50 | Performed by: STUDENT IN AN ORGANIZED HEALTH CARE EDUCATION/TRAINING PROGRAM

## 2023-07-12 PROCEDURE — 250N000011 HC RX IP 250 OP 636: Mod: JZ | Performed by: STUDENT IN AN ORGANIZED HEALTH CARE EDUCATION/TRAINING PROGRAM

## 2023-07-12 RX ORDER — TRIAMCINOLONE ACETONIDE 40 MG/ML
40 INJECTION, SUSPENSION INTRA-ARTICULAR; INTRAMUSCULAR ONCE
Status: COMPLETED | OUTPATIENT
Start: 2023-07-12 | End: 2023-07-12

## 2023-07-12 RX ORDER — ROPIVACAINE HYDROCHLORIDE 5 MG/ML
4 INJECTION, SOLUTION EPIDURAL; INFILTRATION; PERINEURAL ONCE
Status: COMPLETED | OUTPATIENT
Start: 2023-07-12 | End: 2023-07-12

## 2023-07-12 RX ADMIN — ROPIVACAINE HYDROCHLORIDE 4 ML: 5 INJECTION EPIDURAL; INFILTRATION; PERINEURAL at 14:33

## 2023-07-12 RX ADMIN — TRIAMCINOLONE ACETONIDE 40 MG: 40 INJECTION, SUSPENSION INTRA-ARTICULAR; INTRAMUSCULAR at 14:34

## 2023-07-12 ASSESSMENT — PAIN SCALES - GENERAL
PAINLEVEL: MODERATE PAIN (5)
PAINLEVEL: MODERATE PAIN (5)

## 2023-07-12 NOTE — PATIENT INSTRUCTIONS
[facility]  Post Procedure Instructions    Today you saw:  Dr. Gil    Today you had:    occipital nerve block                          Medications used:  ropivacaine, kenalog       Go to the emergency room if you develop any shortness of breath  Monitor the injection sites for signs and symptoms of infection-fever, chills, redness, swelling, warmth, or drainage to areas.  You may have soreness at injection sites for up to 24 hours.  It may take up to 14 days for the steroid medication to start working although you may feel the effect as early as a few days after the procedure.     You may apply ice to the painful areas to help minimize the discomfort of the needle pokes.  Do not apply heat to sites for at least 12 hours.  You may use anti-inflammatory medications or Tylenol for pain control if necessary    Pain Clinic phone number:  [phone]

## 2023-07-12 NOTE — PROGRESS NOTES
Pre procedure Diagnosis: bilateral occipital neuralgia   Post procedure Diagnosis: Same  Procedure performed: bilateral greater occipital nerve blocks, CPT code 50467-68  Anesthesia: none  Complications: none  Operators: Ronnie Gil MD    Indications:   Sandy Spencer is a 82 year old female with a history of bilateral occipital neuralgia.    Options/alternatives, benefits and risks were discussed with the patient including bleeding, infection, hematoma, nerve damage, stroke, and spinal cord injury.  Questions were answered to her satisfaction and she agrees to proceed. Voluntary informed consent was obtained and signed.     Vitals were reviewed: Yes  Allergies were reviewed:  Yes   Medications were reviewed:  Yes   Pre-procedure pain score: 5/10    Procedure:  After getting informed consent, a Pause for the Cause was performed.    The occipital ridge, occipital protuberance, and mastoid process were palpated on both sides.  The location of maximal tenderness which was consistent with the location of the bilateral occipital nerves palpated.  The area was cleaned.    Palpation for a pulse was completed, with no pulse noted at the site of the injection.  A 25G, 1.5 inch needle was introduced, aimed cephalad, in the superficial tissues at this area of tenderness.  The injection was completed at this location. Aspiration for heme was negative before all injections.    In total, 4ml of 0.5% ropivacaine and 40mg Kenalog was injected.     The patient tolerated the procedure well, hemostasis was achieved.      Post-procedure pain score: see flowsheet/10  Follow-up includes:   -f/u with referring provider  -schedule prn    Ronnie Gil MD  Interventional Pain Medicine  [facility]        
Sandy presents to to the clinic for a bilateral occipital nerv block today.      Her pain score was a 5 today.  Daina Quinn MA  [facility] [place]    
none

## 2023-07-14 ENCOUNTER — VIRTUAL VISIT (OUTPATIENT)
Dept: FAMILY MEDICINE | Facility: CLINIC | Age: 83
End: 2023-07-14
Payer: MEDICARE

## 2023-07-14 ENCOUNTER — TELEPHONE (OUTPATIENT)
Dept: FAMILY MEDICINE | Facility: CLINIC | Age: 83
End: 2023-07-14

## 2023-07-14 DIAGNOSIS — G89.29 CHRONIC NECK PAIN: ICD-10-CM

## 2023-07-14 DIAGNOSIS — G89.4 CHRONIC PAIN SYNDROME: Primary | ICD-10-CM

## 2023-07-14 DIAGNOSIS — F43.21 ADJUSTMENT DISORDER WITH DEPRESSED MOOD: ICD-10-CM

## 2023-07-14 DIAGNOSIS — G90.50 REFLEX SYMPATHETIC DYSTROPHY: ICD-10-CM

## 2023-07-14 DIAGNOSIS — G43.809 OTHER MIGRAINE WITHOUT STATUS MIGRAINOSUS, NOT INTRACTABLE: ICD-10-CM

## 2023-07-14 DIAGNOSIS — M54.2 CHRONIC NECK PAIN: ICD-10-CM

## 2023-07-14 PROCEDURE — 99443 PR PHYSICIAN TELEPHONE EVALUATION 21-30 MIN: CPT | Mod: 95 | Performed by: FAMILY MEDICINE

## 2023-07-14 RX ORDER — DIVALPROEX SODIUM 125 MG/1
125 TABLET, DELAYED RELEASE ORAL 2 TIMES DAILY
Qty: 60 TABLET | Refills: 3 | Status: SHIPPED | OUTPATIENT
Start: 2023-07-14 | End: 2023-08-02

## 2023-07-14 NOTE — TELEPHONE ENCOUNTER
Per Dr. Rees's request please call and assist this patient in setting up an appointment in roughly 6wks for a medication check.     Thank you!    Charu Parra, BETH

## 2023-07-14 NOTE — PROGRESS NOTES
"Sandy is a 82 year old who is being evaluated via a billable telephone visit.      What phone number would you like to be contacted at? [phone]  How would you like to obtain your AVS? Danish    Distant Location (provider location):  On-site    Assessment & Plan     Chronic pain syndrome  -Will refer to home care to see if we can help with her mobility, self care as well. In addition to this, she will look into some alternate outpatient clinics to see if they do taping as well. She will reach out if she would like a referral for this.   - Home Care Referral    Adjustment disorder with depressed mood  -She does need to see mental health, referral placed for this.   - Adult Mental Health  Referral    Other migraine without status migrainosus, not intractable  -Will trial a lower dose of Depakote as listed, discussed two times daily is more even of a dosing.   - divalproex sodium delayed-release (DEPAKOTE) 125 MG DR tablet  Dispense: 60 tablet; Refill: 3    Reflex sympathetic dystrophy  -Per above  - Home Care Referral    Chronic neck pain  -Patient did see a chiropractor in the past, per recommendation of the ER provider, referral placed to see if this will help with payment by insurance.   - Chiropractic Referral               Caitlyn Rees MD  [facility]   Sandy is a 82 year old, presenting for the following health issues:  RECHECK (Follow up, started new medication - depakote. Patient thought the dose was \"too strong\" because she was getting headaches so she cut the dose in 1/2 which seems to have lessened her headache. )        6/14/2023    12:46 PM   Additional Questions   Roomed by Charu Hagan of Present Illness       Headaches:   Since the patient's last clinic visit, headaches are: no change  The patient is getting headaches:  3-5 times per week  She is not able to do normal daily activities when she has a migraine.  The patient is taking " "the following rescue/relief medications:  Other   Patient states \"I get only a small amount of relief\" from the rescue/relief medications.   The patient is taking the following medications to prevent migraines:  Depakote  In the past 4 weeks, the patient has gone to an Urgent Care or Emergency Room 0 times times due to headaches.    Vascular Disease:  She presents for follow up of vascular disease.  She never takes nitroglycerin. She is not taking daily aspirin.    She eats 2-3 servings of fruits and vegetables daily.She consumes 0 sweetened beverage(s) daily.She exercises with enough effort to increase her heart rate 9 or less minutes per day.  She exercises with enough effort to increase her heart rate 3 or less days per week.   She is taking medications regularly.       She went in to see the kidney doctors on 7/11 due to her feeling worried that her BP was still too low.     She did decrease the Depakote as she was waking up everyday with a headache. She is cutting them in half and the headache has almost left. She does have a terrible headache all the time.     She can't get into PT unless she goes to [place] or [facility] as there is no one closer that does taping.     She does note that her children have told her that she is cruel so they do not want to have anything to do with her. She does feel that she cannot stop talking.She does find that she is tearful all the time. She does note that she is sad as a friend of hers is moving to [place] in a week.     She tells me that she would like a home health aide. She doesn't go out at all due to her breathing issues. She does only go out for doctor appointments. She doesn't even get her mail. She is not driving anymore. She cannot hang up her clothes as she is too dizzy to stand for prolonged periods of time due to dizziness.     She did go to the chiropractor. She does feel like she should not have gotten a bill for this as she was told that she should not " need to have to pay for this as she was told taht she should not have a copay if a doctor orders it.       Review of Systems   Per above      Objective           Vitals:  No vitals were obtained today due to virtual visit.    Physical Exam   healthy, alert and no distress  PSYCH: Alert and oriented times 3; coherent speech, normal   rate and volume, able to articulate logical thoughts, able   to abstract reason, no tangential thoughts, no hallucinations   or delusions  Her affect is anxious, she is talking more than typical  RESP: No cough, no audible wheezing, able to talk in full sentences  Remainder of exam unable to be completed due to telephone visits       Phone call duration: 44 minutes

## 2023-07-14 NOTE — PROGRESS NOTES
"Medication Therapy Management (MTM) Encounter    ASSESSMENT:                            Medication Adherence/Access: No issues identified.       CRPS:    Migraines: Didn't tolerate higher dose of Depakote. Lower dose is getting delivered today. Unable to administer Lidocaine because of the size of spray bottles. Will see how patient responds to Depakote. Could consider CGRPi injections for prevention if not tolerated.     For muscle spasms, was previously using Robaxin. Okay to restart at twice daily as needed. Pt elected to set once daily in her evening meds and take the second dose as needed.     Anxiety: Not sleeping well. Thinks it's related to muscle spasms. See above.       Low Vitamin D: Last levels WNL.       Gout: Appropriately using allopurinol. Consider repeat uric acid with next in person visit.          PLAN:                            1. Start Divalproex 125 mg DR twice daily as prescribed by Dr. Rees.   2. Okay to use Methocarbamol 500 mg twice daily as needed. We discussed setting 1 tab at bedtime everyday and using the 2nd tab as needed during the day.     Follow-up: Return in about 4 weeks (around 8/14/2023) for Medication Management Pharmacist, in person.  8/2 with Dr. Lynn     SUBJECTIVE/OBJECTIVE:                          Sandy Spencer is a 82 year old female called for an initial visit. She was referred to me from Nikunj Lynn MD.      Reason for visit: Pain follow-up. \"I don't want to be on medicines that's going to make my personality change more than others have told me it has\"     Allergies/ADRs: Reviewed in chart  Past Medical History: Reviewed in chart  Tobacco: She reports that she quit smoking about 23 years ago. Her smoking use included cigarettes. She has a 20.00 pack-year smoking history. She has never used smokeless tobacco.  Alcohol:  reports no history of alcohol use.       Medication Adherence/Access: Patient worried about side effects. Has made some med adjustments on " her own. Has a lot of old medicines.         CRPS:    Migraines: Prescribed Divalproex Sodium 125 mg DR twice daily, Venlafaxine 150 mg ER daily, levetiracetam  250 mg four times daily, Lidocaine 4% patch daily,     At last visit with Dr. Lynn, started on Ketamine 5% nasal spray every 6 hours as needed, Lidocaine 4% spray three times daily PRN.     Had bilateral occipital nerve blocks with Dr. Gil on 7/12.     *Was having side effects from Depakote. Dr. Rees lowered the dose to 125 mg. Hasn't received yet.     Using Keppra 1 tab twice daily.   Was using Methocarbamol - but had stopped- unclear why. Having muscle spasms       Anxiety: Prescirbed Venlafaxine 150 mg ER daily, Trazodone 100 mg 4 tabs daily     Despite trazodone use, not sleeping well. Takes varying amounts based on ability to sleep.     This morning, her daughter called the police to have them check on the patient.     Is doing thing she's has never done. Has been checked for dementia.   Today:         Low Vitamin D: Prescribed Vitamin D3 5000 units daily .     Vitamin D Deficiency Screening Results:  Lab Results   Component Value Date    VITDT 39 01/31/2022          Gout: Recently restarted on allopurinol 100 mg 2 tabs daily    Uric Acid   Date Value Ref Range Status   06/14/2023 8.0 (H) 2.4 - 5.7 mg/dL Final              Today's Vitals: LMP  (LMP Unknown)   ----------------        I spent 43 minutes with this patient today. All changes were made via collaborative practice agreement with Nikunj Lynn MD. A copy of the visit note was provided to the patient's provider(s).    A summary of these recommendations was sent via [employer].    John Flores PharmD  Medication Therapy Management (MTM) Pharmacist  [facility]      Telemedicine Visit Details  Type of service:  Telephone visit  Start Time: 2:14 PM  End Time: 2:57 PM     Medication Therapy Recommendations  Chronic pain    Current Medication: Lidocaine (LIDOCARE) 4 %  Patch   Rationale: Synergistic therapy - Needs additional medication therapy - Indication   Recommendation: Start Medication - methocarbamol 500 MG tablet   Status: Accepted per CPA

## 2023-07-17 ENCOUNTER — TRANSFERRED RECORDS (OUTPATIENT)
Dept: HEALTH INFORMATION MANAGEMENT | Facility: CLINIC | Age: 83
End: 2023-07-17
Payer: MEDICARE

## 2023-07-17 ENCOUNTER — VIRTUAL VISIT (OUTPATIENT)
Dept: PHARMACY | Facility: OTHER | Age: 83
End: 2023-07-17
Payer: COMMERCIAL

## 2023-07-17 DIAGNOSIS — G43.719 INTRACTABLE CHRONIC MIGRAINE WITHOUT AURA AND WITHOUT STATUS MIGRAINOSUS: ICD-10-CM

## 2023-07-17 DIAGNOSIS — E55.9 VITAMIN D DEFICIENCY: ICD-10-CM

## 2023-07-17 DIAGNOSIS — E79.0 ELEVATED BLOOD URIC ACID LEVEL: ICD-10-CM

## 2023-07-17 DIAGNOSIS — F06.4 ANXIETY DISORDER DUE TO MEDICAL CONDITION: ICD-10-CM

## 2023-07-17 DIAGNOSIS — G89.4 CHRONIC PAIN SYNDROME: Primary | ICD-10-CM

## 2023-07-17 PROCEDURE — 99207 PR NO CHARGE LOS: CPT | Mod: VID | Performed by: PHARMACIST

## 2023-07-17 RX ORDER — METHOCARBAMOL 500 MG/1
500 TABLET, FILM COATED ORAL 2 TIMES DAILY
COMMUNITY
End: 2023-08-04

## 2023-07-17 NOTE — PATIENT INSTRUCTIONS
"Recommendations from today's MTM visit:                                                       1. Start Divalproex 125 mg DR twice daily as prescribed by Dr. Rees.   2. Okay to use Methocarbamol 500 mg twice daily as needed. We discussed setting 1 tab at bedtime everyday and using the 2nd tab as needed during the day.        Follow-up: Return in about 4 weeks (around 8/14/2023) for Medication Management Pharmacist, in person.    It was great speaking with you today.  I value your experience and would be very thankful for your time in providing feedback in our clinic survey. In the next few days, you may receive an email or text message from [employer] with a link to a survey related to your  clinical pharmacist.\"     To schedule another MTM appointment, please call the clinic directly or you may call the MTM scheduling line at [phone] or toll-free at [phone].     My Clinical Pharmacist's contact information:                                                      Please feel free to contact me with any questions or concerns you have.      John Flores, PharmD  Medication Therapy Management (MTM) Pharmacist  [facility]      "

## 2023-07-17 NOTE — TELEPHONE ENCOUNTER
[employer] order filled out and will fax to provider tomorrow when back in clinic   Home peripheral edema

## 2023-07-18 ENCOUNTER — PATIENT OUTREACH (OUTPATIENT)
Dept: CARE COORDINATION | Facility: CLINIC | Age: 83
End: 2023-07-18
Payer: MEDICARE

## 2023-07-18 ENCOUNTER — NURSE TRIAGE (OUTPATIENT)
Dept: FAMILY MEDICINE | Facility: CLINIC | Age: 83
End: 2023-07-18
Payer: MEDICARE

## 2023-07-18 DIAGNOSIS — R41.0 CONFUSION: Primary | ICD-10-CM

## 2023-07-18 NOTE — TELEPHONE ENCOUNTER
Viola notified. Also, asked about pt being on hospice. Sandy told Viola that she was going to be put on hospice. Viola notified that home care was ordered for her to have PT, home safety evaluation and home health aide. She was also told that she could call 911 instead of trying to talk her mom into going to ER if she's really not making sense and isn't of sound mind.

## 2023-07-18 NOTE — TELEPHONE ENCOUNTER
I do agree thatchay should be seen in the ER if she seems really off unfortunately. They do have social workers that can help with some of the crisis responses.

## 2023-07-18 NOTE — PROGRESS NOTES
Clinic Care Coordination Contact  [facility]/Voicemail       Clinical Data: Care Coordinator Outreach  Outreach attempted x 1.  Left message on patient's daughter Viola voicemail with call back information and requested return call.  Plan: Care Coordinator CHW to discuss recent CC referral   Care Coordinator will try to reach patient again in 1-2 business days= 7/19/2023    Comments    Police department called to patient's home on 7/17 for welfare check. Police suggested to family to contact [facility]  - daughter unsure where to start with this process.            Order Questions    Question Answer   Reason for Referral: Patient/Caregiver Support   Patient/Caregiver Suport: Navigation of Long Term Care/[facility]    Resources for Support   Clinical Staff have discussed the Care Coordination Referral with the patient and/or caregiver: Yes   Additional Information: Referral discussed with daughterViola. (Consent to communicate on file.)         Kaitlyn PLEITEZ  Community Health Worker  [facility] Care Coordination  [facility] [email or web address] [email or web address]  Office: [phone]

## 2023-07-18 NOTE — TELEPHONE ENCOUNTER
Nurse Triage SBAR    Is this a 2nd Level Triage? NO    Situation: Daughter calling regarding recent onset of strange/paranoid behavior from patient. She is looking for advice on next steps for care of her mother - wants to her to have better quality of life.    Background: Behavorial history: ADD, Depression, Anxiety Schizoaffective disorder.  Daughter reports past hospitalizations for mental health.    Sleep history: Insomnia, Circadian rhythm sleep disorder    Chronic pain - treated by Pain Psychiatrist Dr. Lynn. Recent pain injections at [facility] in hips and knees in past 6 weeks.    Assessment: Please see below assessment questionnaire.     For past month, patient has demonstrated strange behavior. She is telling people people that she is dying and on hospice, that her family has nothing to do with her. Patient is not trusting of her family and is verbally abusive to family. Daughter reports that no one wants to be around the patient. Per daughter, patient has not made threats of harm to herself or others.     Per daughter, patient sends text messages that do not make sense and are difficult to follow. She is unable to clarify what she means. Family is having a difficult time communicating with her.     Per daughter, patient remarks about the cost of things frequently. She remarks about collecting records from credit card company. Daughter reports that she is POA for her mom and knows her mother has adequate funds in checking and savings - daughter unsure why credit card records are brought up by patient frequently.    Police department was called out to patient's home yesterday because the neighbor was unable to access the home. (Usually there is a key in the garage but the patient had removed it.)  She was screaming to police that she did not want her daughter. Police recommended daughter to seek assistance from county .    Daughter reports that they had a family meeting last week. It was  nonproductive with the patient so they took a break and will reconvene in the future. Patient is verbally hostile to daughter. Daughter will no longer see her 1:1 and elects to have someone else present. Daughter is unsure what do to for her mother and is seeking guidance and assistance in next steps for care of her mother.    Protocol Recommended Disposition:   Go to ED Now.     Recommendation: Daughter agrees with advice to go to ED and states understanding. She is unsure if the patient will be agreeable to this. She states that her  or a Islam friend of the patient may be able to convince her to go to ED.    Daughter instructed to call back if questions or patient becomes worse.    Routed to provider for awareness and input on next steps. Care management order placed.    Does the patient meet one of the following criteria for ADS visit consideration? 16+ years old, with an [facility] PCP     TIP  Providers, please consider if this condition is appropriate for management at one of our Acute and Diagnostic Services sites.     If patient is a good candidate, please use dotphrase <dot>triageresponse and select Refer to ADS to document.        Reason for Disposition    Very strange or paranoid behavior    Additional Information    Negative: Difficult to awaken or acting confused (disoriented, slurred speech) and has diabetes mellitus    Negative: Difficult to awaken or acting confused (disoriented, slurred speech) and new-onset    Negative: Weakness of the face, arm, or leg on one side of the body and new-onset    Negative: Numbness of the face, arm, or leg on one side of the body and new-onset    Negative: Loss of speech or garbled speech and new-onset    Negative: Difficulty breathing and bluish (or gray) lips or face    Negative: Shock suspected (e.g., cold/pale/clammy skin, too weak to stand, low BP, rapid pulse)    Negative: Seeing or hearing or feeling things that are not there (i.e., auditory, visual, or  "tactile hallucinations)    Negative: Followed a head injury    Negative: Drug overdose suspected    Negative: Sounds like a life-threatening emergency to the triager    Negative: Questions or concerns about alcohol use, unhealthy alcohol use, binge drinking, intoxication, or withdrawal    Negative: Questions or concerns about substance use (drug use), unhealthy drug use, intoxication, or withdrawal    Negative: Diabetes mellitus and confusion from low blood sugar (i.e., < 60 mg/dl or 3.5 mmol/l)    Negative: Headache or vomiting    Negative: Stiff neck (can't touch chin to chest)    Answer Assessment - Initial Assessment Questions  1. LEVEL OF CONSCIOUSNESS: \"How is he (she, the patient) acting right now?\" (e.g., alert-oriented, confused, lethargic, stuporous, comatose)      Not with patient currently. During recent interactions, patient seems jittery, bouncing off the walls. Patient remarked that is smelled like coffee. Daughter found that the patient had attempted to make a cup of coffee with Keurig but forgot to put cup under so coffee had spilled out. Topic of conversation is \"all over the place\" during recent interactions. Text message sent are incoherent and do not make sense.  2. ONSET: \"When did the confusion start?\"  (minutes, hours, days)      Daughter implies that patient has always had a difficult personality. Daughter has noticed an acute change over the past month.  3. PATTERN \"Does this come and go, or has it been constant since it started?\"  \"Is it present now?\"      Behaviors have been constant with caller for past 1.5 weeks - patient is not trusting of her family. She has been telling people that her family has nothing to do with her, that she has stage 5 kidney disease and is on hospice.  4. ALCOHOL or DRUGS: \"Has he been drinking alcohol or taking any drugs?\"       Daughter reports that patient \"likes pills a lot\". She is unsure if margi madrid is taking her prescriptions as presicbed. 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  Daughter found fentanyl patches a few months ago in patient's home and is unsure if patient has access to more. Daughter has found drugs in her possession before but reports that the patient has a \"clean\" drug test in June.  5. NARCOTIC MEDICATIONS: \"Has he been receiving any narcotic medications?\" (e.g., morphine, Vicodin)      Daughter is unsure if patient has access. She understands that the patient has a ketamine nasal spray currently. Not currently prescribed fentanyl patches has had in the past.  6. CAUSE: \"What do you think is causing the confusion?\"       Daughter is unsure. Daughter states concern about depakote dose reduction last week.  7. OTHER SYMPTOMS: \"Are there any other symptoms?\" (e.g., difficulty breathing, headache, fever, weakness)      No other symptoms have been observed by daughter.    Protocols used: CONFUSION - DELIRIUM-A-OH    Angélica GERMAN, RN, PHN  [facility]    "

## 2023-07-19 ENCOUNTER — PATIENT OUTREACH (OUTPATIENT)
Dept: CARE COORDINATION | Facility: CLINIC | Age: 83
End: 2023-07-19
Payer: MEDICARE

## 2023-07-19 NOTE — PROGRESS NOTES
Clinic Care Coordination Contact  Community Health Worker Initial Outreach    CHW Initial Information Gathering:  Referral Source: PCP  Preferred Urgent Care: [facility] [place], [phone]  Current living arrangement:: I live in a private home  Type of residence:: Private home - stairs  Community Resources: None  Informal Support system:: Children, Friends, Family  No PCP office visit in Past Year: No  Transportation means:: Regular car, Family  CHW Additional Questions  If ED/Hospital discharge, follow-up appointment scheduled as recommended?: N/A  Medication changes made following ED/Hospital discharge?: N/A  MyChart active?: Yes  Patient sent Social Determinants of Health questionnaire?: No (per daughter's request)    Patient accepts CC: Yes. Patient scheduled for assessment with CC BRYAN Byrd on 7/20/2023 at 11:00 am. Patient noted desire to discuss Recent CC referral .     Comments    Police department called to patient's home on 7/17 for welfare check. Police suggested to family to contact [facility]  - daughter unsure where to start with this process.            Order Questions    Question Answer   Reason for Referral: Patient/Caregiver Support   Patient/Caregiver Suport: Navigation of Long Term Care/[facility] Services    Resources for Support   Clinical Staff have discussed the Care Coordination Referral with the patient and/or caregiver: Yes   Additional Information: Referral discussed with daughterViola. (Consent to communicate on file.)     Kaitlyn PLEITEZ  Community Health Worker  [facility] Care Coordination  [facility], Franki Stearns  [email or web address]  Office: [phone]

## 2023-07-20 ENCOUNTER — TELEPHONE (OUTPATIENT)
Dept: FAMILY MEDICINE | Facility: CLINIC | Age: 83
End: 2023-07-20

## 2023-07-20 ENCOUNTER — PATIENT OUTREACH (OUTPATIENT)
Dept: NURSING | Facility: CLINIC | Age: 83
End: 2023-07-20
Payer: MEDICARE

## 2023-07-20 NOTE — PROGRESS NOTES
Contact   Chart Review     Situation: Patient chart reviewed by .    Background: Referral to care coordination.     Assessment: Talked to daughter Viola who is very concerned about her mom's increasing delusional behaviors.  Mom has had mental health problems throughout her life.  Now, she seems more unstable and not able to care for herself safely.  Viola left a VM with pain clinic psychiatrist and is waiting for a call back.  PCP has ordered home care and it has not started yet.  Mom doesn't trust Viola, but Viola's  can get her to do things a bit more.  Viola has a sister who is also involved.  The police were called this week and they couldn't get into her home and Viola was called and she was able to make sure she was safe.  Neighbors called police due to her yelling in her home.  Viola is unsure if she is taking her meds, or if she has infection as patient is not allowing them to support her.  Viola doesn't think the problem is a dementia issue, but more due to mental health.  She showed a friend of hers, who is a nurse who has experience in mental health, the texts her mom is sending her and this nurse told her that her mom is in trouble.      Long discussion of a plan so patient is safe.  Gave her phone number for [place] [facility] so she can make a report.  Informed her that writer will be making a report as well.  Strongly encouraged her to take patient to the ED, preferably [place] location which has a mental health unit.  Viola will talk with  and sister to create a plan to get mom the help she needs.  Viola has been caring for her mom her whole life and appreciated the support from writer in making tough decisions regarding her mom's needs at this time.     Mom has $7,000 in her home and mom may be giving money to her grand daughter who has CD concerns.  This would be unusual for mom to want to give this person any money.       Plan/Recommendations: Will complete the [facility] report. Reporting number is [id number].  Available to assist daughter and patient as needed.  Did not complete assessment due to emergency situation. Will do chart review in one week.     Rochelle Weber,   [facility]  [phone]

## 2023-07-20 NOTE — TELEPHONE ENCOUNTER
Home Care is calling regarding an established patient with [facility].    Requesting orders from: Caitlyn Rees  Provider is following patient: Yes  Is this a 60-day recertification request?  No    Orders Requested    Physical Therapy  Request for delay in care, service is not able to be provided within same scheduled day.     Delayed to until tomorrow (07/20/2023) per patient preference.    Confirmed ok to leave a detailed message with call back.  Contact information confirmed and updated as needed.     Will route to provider for approval of verbal orders and RN to call back.    Angélica SONGN, RN, PHN  [facility]

## 2023-07-21 ENCOUNTER — PATIENT OUTREACH (OUTPATIENT)
Dept: CARE COORDINATION | Facility: CLINIC | Age: 83
End: 2023-07-21
Payer: MEDICARE

## 2023-07-21 NOTE — TELEPHONE ENCOUNTER
Talked to [facility]. Gave them Dr. Kuhn okay for delay of care.     Cristina Holman CMA.

## 2023-07-21 NOTE — PROGRESS NOTES
Contact   Chart Review     Situation: Patient chart reviewed by .  Assessment: Call from daughter Viola.  She, her  and sister were with patient last evening when they called EMS due to her hallucinations.  They transported her to [facility] and is in the mental health unit.  Viola is in communication with [employer]. She was asked to provide the POA and HCD and wants to send to Erly Buckeye as well.      Viola is concerned that hospital staff brought up that patient may not be able to return to home.  Viola will work with hospital staff and keep writer informed.      She asked writer to call [facility] to let them know that she is in hospital.  Called and informed them.     Plan/Recommendations: work with Viola for a safe discharge plan.      Rochelle Weber,   [facility]  [phone]

## 2023-07-25 ENCOUNTER — PATIENT OUTREACH (OUTPATIENT)
Dept: CARE COORDINATION | Facility: CLINIC | Age: 83
End: 2023-07-25
Payer: MEDICARE

## 2023-07-25 NOTE — PROGRESS NOTES
Clinic Care Coordination Contact  Care Team Conversations  Call from daughter to update- patient has agreed to transfer to the adult mental health unit at [facility]. Viola is grateful she agreed to that.  The staff have told Viola that patient is on four medications that she should not be taking. Two were prescribed by pain clinic and two by PCP.  She is going to go to patient's house to find these meds and will lock them up in her safe. Patient doesn't want Viola to throw away her meds.  Viola wants to make sure that [facility] providers are aware of the recommendations of the [facility] team. The psychiatrist there has recommended that she continue with psychiatry after discharge.  Viola would like to have a [facility]  be involved after discharge as she is feeling caregiver burden.  Assured her that after patient is discharged, her providers will have appointments with her to follow the recommendations.  She appreciated the information and will call with updates.    Rochelle Weber,   [facility]  [phone]

## 2023-08-01 ENCOUNTER — PATIENT OUTREACH (OUTPATIENT)
Dept: CARE COORDINATION | Facility: CLINIC | Age: 83
End: 2023-08-01

## 2023-08-01 ASSESSMENT — ACTIVITIES OF DAILY LIVING (ADL): DEPENDENT_IADLS:: INDEPENDENT;MEDICATION MANAGEMENT;TRANSPORTATION

## 2023-08-01 NOTE — LETTER
[facility] CARE COORDINATION  [place]   August 1, 2023    Sandy Spencer  [address]  [place] [address]      Dear Sandy,        I am a  clinic care coordinator who works with Caitlyn Rees MD with the [facility]. I wanted to thank you for spending the time to talk with me.  Below is a description of clinic care coordination and how I can further assist you.       The clinic care coordination team is made up of a registered nurse, , financial resource worker and community health worker who understand the health care system. The goal of clinic care coordination is to help you manage your health and improve access to the health care system. Our team works alongside your provider to assist you in determining your health and social needs. We can help you obtain health care and community resources, providing you with necessary information and education. We can work with you through any barriers and develop a care plan that helps coordinate and strengthen the communication between you and your care team.  Our services are voluntary and are offered without charge to you personally.    Please feel free to contact me with any questions or concerns regarding care coordination and what we can offer.      We are focused on providing you with the highest-quality healthcare experience possible.    Sincerely,     Rochelle Weber,   [facility]  [phone]      Enclosed: I have enclosed a copy of the Patient Centered Plan of Care. This has helpful information and goals that we have talked about. Please keep this in an easy to access place to use as needed.

## 2023-08-01 NOTE — LETTER
[facility]  Patient Centered Plan of Care  About Me:        Patient Name:  Sandy Spencer    YOB: 1940  Age:         82 year old   Kylah MRN:    [id number] Telephone Information:  Home Phone [phone]   Mobile [phone]       Address:  [address]  [place] [address] Email address:  [email or web address]      Emergency Contact(s)    Name Relationship Lgl Grd Work Phone Home Phone Mobile Phone   1. EUFEMIA PENALOZA* Daughter No  [phone]   2. MARTIN ZENG* Daughter   [phone]    3. BESSIE MOYA Other No  [phone]           Primary language:  English     needed? No   [employer]:  [phone] op. 1  Other communication barriers:Caregiver    Preferred Method of Communication:     Current living arrangement: I live in a private home    Mobility Status/ Medical Equipment: Independent        Health Maintenance  Health Maintenance Reviewed: Due/Overdue   Health Maintenance Due   Topic Date Due    COPD ACTION PLAN  Never done    COVID-19 Vaccine (1) Never done    MEDICARE ANNUAL WELLNESS VISIT  Never done           My Access Plan  Medical Emergency 911   Primary Clinic Line [facility] - [phone]   24 Hour Appointment Line [phone] or  [phone]) (toll-free)   24 Hour Nurse Line [phone] (toll-free)   Preferred Urgent Care [facility], [phone]     [facility]  [phone]     Preferred Pharmacy [address] [place] [address]     Behavioral Health Crisis Line The National Suicide Prevention Lifeline at 1-280.517.8617 or Text/Call 988             My Care Team Members  Patient Care Team         Relationship Specialty Notifications Start End    Caitlyn Rees MD PCP - General   7/29/15     Phone: [phone] Fax: [phone]         [address] [facility] [address]    Caitlyn Rees MD Assigned PCP   6/16/21     Phone: [phone] Fax: [phone]         [address] [facility] [address]    Luz Elena Ybarra MD Assigned Heart and Vascular Provider   7/16/21     Phone: [phone] Fax: [phone]         [address] [facility] [address]    Michael Ramirez DO Assigned Neuroscience Provider   1/9/22     Phone: [phone] Fax: [phone]         [address] [facility] [address] [facility] [address]    Nellie Ware MBBS Assigned Rheumatology Provider   3/6/22     Phone: [phone] Fax: [phone]         [facility] [facility] [address]    Magali Garrison SLP Speech Language Pathologist Speech Language/Path  6/27/22     Phone: [phone]          [address] [facility] [address]    Christine Bennett PABenjaminC Referring Physician Neurology  6/27/22     [facility] voice clinic referral.    Phone: [phone] Fax: [phone]          [facility] [address]    Rigoberto Castillo MD Assigned Surgical Provider   7/23/22     Phone: [phone] Fax: [phone]         [address] [place] [address]  [facility] [address]    Luz Elena Ybarra MD MD Cardiovascular Disease  2/21/23     Phone: [phone] Fax: [phone]         [facility] [facility] [address]    Rudolph Gamez, PsyD Assigned Sleep Provider   3/4/23     Phone: [phone] Fax: [phone] [address] [facility] [address] [facility] [address]    Zamzam Hercules MD Assigned Pulmonology Provider   5/6/23      [address] [facility] [address]    Darlni Ballard, PharmD Assigned MTM Pharmacist   5/27/23     Phone: [phone] Fax: [phone]         [facility] [address] [place] [address]    Nikunj Lynn MD Assigned Behavioral Health Provider   7/15/23     Phone: [phone] Fax: [phone] [address] [facility] [address]    John Flores, PharmD Pharmacist Pharmacist  7/17/23     Phone: [phone]           [facility] [address] [place] [address]    Rochelle Weber LSW Lead Care Coordinator Primary Care - CC Admissions 7/19/23     Phone: [phone]                   My Care Plans  Self Management and Treatment Plan  Care Plan  Care Plan: Transportation       Problem: Lack of transportation       Goal: Establish reliable transportation through [employer]       Start Date: 8/1/2023 Expected End Date: 5/1/2024    Priority: Medium    Note:     Barriers: none noted.   Strengths: daughter can assist with application.   Patient expressed understanding of goal: daughter does  Action steps to achieve this goal:  1. Daughter will print the application and will complete patient part.   2. Daughter will work with PCP or pain clinic to ask to have the professional portion completed.   3. I will report progress towards this goal at scheduled outreach telephone calls from the CCC team.                                Care Plan: Medication Regimen       Problem: Medication Adherence       Long-Range Goal: I will take my medications as prescribed.       Start Date: 8/1/2023 Expected End Date: 7/31/2024    Priority: High    Note:     Barriers: doesn't always take her medications as prescribed.   Strengths: daughter can assist.   Patient expressed understanding of goal: daughter does  Action steps to achieve this goal:  1. Daughter will work with MTM to understand current medication regime.  2. Daughter will work with PCP and pain clinic to see if a psychiatrist is needed.    3. Daughter will report progress towards this goal at scheduled outreach telephone calls from the CCC team.                               Care Plan: Help At Home       Problem: Insufficient In-home support       Long-Range Goal: I have enough support at home to live independently.       Start Date: 8/1/2023 Expected End Date: 7/31/2024    Priority: High    Note:     Barriers: patient may not accept.   Strengths: daughter is supportive  Patient  expressed understanding of goal: daughter does  Action steps to achieve this goal:  1. Daughter will monitor patient's needs.  2. Daughter will work with care team to get needed services.   3. Daughter will report progress towards this goal at scheduled outreach telephone calls from the CCC team.                                       Advance Care Plans/Directives Type:   Advanced Directive - On File      My Medical and Care Information  Problem List   Patient Active Problem List   Diagnosis    Focal glomerular sclerosis    RSD lower limb, bilateral    ADD (attention deficit disorder)    RSD upper limb, right    Major depression    Hypertension    Insomnia    Hypercholesterolemia    Right rotator cuff tear    Cluster headaches    Lumbar radiculopathy    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    Temporomandibular joint-pain-dysfunction syndrome (TMJ)    Hypothyroidism    Chronic pain    Snoring    Generalized abdominal pain    Bilateral carotid artery stenosis    Coronary artery disease involving native coronary artery of native heart with angina pectoris (H)    Other chronic pulmonary embolism without acute cor pulmonale (H)    Hematuria    Hydronephrosis    Bladder spasms    Anxiety disorder due to medical condition    Family relationship problem    History of posttraumatic stress disorder (PTSD)    Generalized muscle weakness    Myofascial pain    Moderate major depression (H)    Elevated lipase    Abdominal bloating    Ampullary stenosis    Obstruction of biliary tree    Anemia    Aphthous ulcer of ileum    Disorder of phosphorus metabolism    Edema    Obstruction of common bile duct    Overflow diarrhea    History of partial colectomy    Reflex sympathetic dystrophy    Solitary left kidney    Flank pain    Hypocitraturia    Primary osteoarthritis of both knees    Iron deficiency anemia    Proteinuria    Concussion with loss of consciousness    Schizoaffective disorder (H)    Muscle weakness (generalized)     Shuffling gait    Falls frequently    Long term current use of anticoagulant therapy    COPD (chronic obstructive pulmonary disease) (H)    CKD (chronic kidney disease) stage 4, GFR 15-29 ml/min (H)    Degeneration of cervical intervertebral disc    Derangement of meniscus    Intractable chronic migraine without aura    Occipital neuralgia    Post-traumatic headache    S/p nephrectomy    Sciatic neuropathy    Pain in right knee    Leg pain, bilateral    Cervical radiculopathy    Spinal stenosis of cervical region    Fibrositis of neck    Hypokalemia    Diarrhea, unspecified type    Hypotension due to hypovolemia    Circadian rhythm sleep disorder, delayed sleep phase type    Obstructive sleep apnea (adult) (pediatric)    Pulsatile tinnitus, left ear    Sleep disturbance      Current Medications and Allergies:    Current Outpatient Medications   Medication Instructions    albuterol (PROAIR HFA/PROVENTIL HFA/VENTOLIN HFA) 108 (90 Base) MCG/ACT inhaler 2 puffs, Inhalation, EVERY 6 HOURS    allopurinol (ZYLOPRIM) 100 mg, Oral, 2 TIMES DAILY    apixaban ANTICOAGULANT (ELIQUIS) 5 mg, Oral, 2 TIMES DAILY    apixaban ANTICOAGULANT (ELIQUIS) 5 mg, Oral, 2 TIMES DAILY    azelastine (ASTELIN) 0.1 % nasal spray 2 sprays each nostril 1-2x daily as needed for nasal congestion (use nightly for first 2 week)    divalproex sodium delayed-release (DEPAKOTE) 125 mg, Oral, 2 TIMES DAILY    ipratropium - albuterol 0.5 mg/2.5 mg/3 mL (DUONEB) 0.5-2.5 (3) MG/3ML neb solution 1 vial, EVERY 6 HOURS PRN    ketamine HCl POWD 5% nasal spray one spray each nostril every six hours as needed, 30 ml bottle    levETIRAcetam (KEPPRA) 250 mg, Oral, 4 TIMES DAILY    levothyroxine (SYNTHROID/LEVOTHROID) 50 mcg, Oral, DAILY    Lidocaine (LIDOCARE) 4 % Patch 1 patch, Transdermal, EVERY 24 HOURS, To prevent lidocaine toxicity, patient should be patch free for 12 hrs daily.    Lidocaine (LIDOCARE) 4 % Patch 2 patches, Transdermal, EVERY 24 HOURS, To  prevent lidocaine toxicity, patient should be patch free for 12 hrs daily.    Lidocaine Base POWD 1 spray, Does not apply, 3 TIMES DAILY PRN, 4% topical solution in 30 ml nasal spray bottle    methocarbamol (ROBAXIN) 500 mg, Oral, 2 TIMES DAILY    methylphenidate (RITALIN) 15 mg, Oral, 2 TIMES DAILY    rosuvastatin (CRESTOR) 40 mg, Oral, DAILY    traZODone (DESYREL) 400 mg, Oral, AT BEDTIME    valACYclovir (VALTREX) 500 mg, Oral, DAILY    venlafaxine (EFFEXOR XR) 150 mg, Oral, DAILY    Vitamin D-3 5,000 Units, Oral, DAILY         Care Coordination Start Date: 7/18/2023   Frequency of Care Coordination: weekly     Form Last Updated: 08/01/2023

## 2023-08-01 NOTE — PROGRESS NOTES
Clinic Care Coordination Contact  [facility]: Post-Discharge Note  SITUATION                                                      Admission:    Admission Date: 07/20/23   Reason for Admission: mental health  Discharge:   Discharge Date: 07/31/23  Discharge Diagnosis: Bi polar    BACKGROUND                                                      Per hospital discharge summary and inpatient provider notes:  Urinary tract infection without hematuria, site unspecified (Primary Dx);  Confusion;  Other chronic pain;  Bipolar affective disorder, currently manic, severe, with psychotic features (HC);  Pure hypercholesterolemia;  Seizure (HC);  Reflex sympathetic dystrophy, unspecified  Discharge Disposition: Home Self Care     ASSESSMENT      Enrollment  Outreach Frequency: weekly    Discharge Assessment  How are you doing now that you are home?: ok  How are your symptoms? (Red Flag symptoms escalate to triage hotline per guidelines): Unchanged  Do you feel your condition is stable enough to be safe at home until your provider visit?: Yes  Does the patient have their discharge instructions? : Yes  Does the patient have questions regarding their discharge instructions? : No  Were you started on any new medications or were there changes to any of your previous medications? : Yes  Does the patient have all of their medications?: Yes  Do you have questions regarding any of your medications? : No  Do you have all of your needed medical supplies or equipment (DME)?  (i.e. oxygen tank, CPAP, cane, etc.): Yes  Discharge follow-up appointment scheduled within 14 calendar days? : No  Is patient agreeable to assistance with scheduling? : Yes         Post-op (Clinicians Only)  Did the patient have surgery or a procedure: No  Fever: No  Chills: No  Eating & Drinking: eating and drinking without complaints/concerns  PO Intake: regular diet  Bowel Function: normal  Urinary Status: voiding without complaint/concerns      PLAN                                                       See below    Future Appointments   Date Time Provider Department Center   8/2/2023  3:30 PM [facility]   8/3/2023 12:30 PM [facility] ENDOCRINOLOGY CSS [facility]   8/14/2023  2:00 PM John Flores, PharmD [facility]MTM [facility]   8/17/2023  1:45 PM [facility] HC ECHO STAFF WWCVTS [facility]   8/30/2023 11:30 AM Caitlyn Rees MD CTFMOB [facility] CTGR   12/8/2023 11:30 AM Caroline Sumner, NP WIENDO [facility]         For any urgent concerns, please contact our 24 hour nurse triage line: [phone] ([phone])         SARTHAK Greenwood      Clinic Care Coordination Contact  Clinic Care Coordination Contact  OUTREACH    Referral Information:  Referral Source: PCP    Primary Diagnosis: Behavioral Health    Chief Complaint   Patient presents with    Clinic Care Coordination - Initial    Clinic Care Coordination - Post Hospital        Universal Utilization: appropriate utilization  Clinic Utilization  Difficulty keeping appointments:: No  Compliance Concerns: No  No-Show Concerns: No  No PCP office visit in Past Year: No  Utilization      Hospital Admissions  1             ED Visits  2             No Show Count (past year)  1                    Current as of: 8/1/2023  9:09 AM                Clinical Concerns:  Current Medical Concerns:  82yr old with chronic pain, working with pain clinic.     Current Behavioral Concerns: Recently discharged from [facility] where she was treated for josé miguel.  She had agreed to move to in patient mental health unit, but there was no openings.  She was stabilized and discharged to home.  Had some medication changes.  Daughter has taken all the medications from patient's home and patient is very upset that daughter took her meds.  Daughter and her  found 80 bottles of medications from her home and she is concerned that she was not taking them properly.    Education Provided to patient: Reviewed role of care  coordination and MTM, caregiver support.     Pain  Pain (GOAL):: No  Health Maintenance Reviewed: Due/Overdue   Health Maintenance Due   Topic Date Due    COPD ACTION PLAN  Never done    COVID-19 Vaccine (1) Never done    MEDICARE ANNUAL WELLNESS VISIT  Never done       Clinical Pathway: None    Medication Management:  Medication review status:   Will review with MTM.  Daughter would like to talk to MTM to discuss pain management.  She wants to determine if a psychiatrist is needed to prescribe her medication.     Functional Status:  Dependent ADLs:: Independent  Dependent IADLs:: Independent, Medication Management, Transportation  Bed or wheelchair confined:: No  Mobility Status: Independent  Fallen 2 or more times in the past year?: No  Any fall with injury in the past year?: No    Living Situation:  Current living arrangement:: I live in a private home  Type of residence:: Private home - stairs    Lifestyle & Psychosocial Needs:    Social Determinants of Health     Tobacco Use: Medium Risk (7/14/2023)    Patient History     Smoking Tobacco Use: Former     Smokeless Tobacco Use: Never     Passive Exposure: Not on file   Alcohol Use: Unknown (3/11/2021)    AUDIT-C     Frequency of Alcohol Consumption: Monthly or less     Average Number of Drinks: Not asked     Frequency of Binge Drinking: Not asked   Financial Resource Strain: Medium Risk (4/15/2022)    Overall Financial Resource Strain (CARDIA)     Difficulty of Paying Living Expenses: Somewhat hard   Food Insecurity: No Food Insecurity (4/15/2022)    Hunger Vital Sign     Worried About Running Out of Food in the Last Year: Never true     Ran Out of Food in the Last Year: Never true   Transportation Needs: No Transportation Needs (4/15/2022)    PRAPARE - Transportation     Lack of Transportation (Medical): No     Lack of Transportation (Non-Medical): No   Physical Activity: Not on file   Stress: Not on file   Social Connections: Not on file   Intimate Partner  Violence: Not on file   Depression: Not at risk (5/31/2023)    PHQ-2     PHQ-2 Score: 2   Housing Stability: Low Risk  (4/15/2022)    Housing Stability Vital Sign     Unable to Pay for Housing in the Last Year: No     Number of Places Lived in the Last Year: 1     Unstable Housing in the Last Year: No     Diet:: Regular  Inadequate nutrition (GOAL):: No  Tube Feeding: No  Inadequate activity/exercise (GOAL):: No  Significant changes in sleep pattern (GOAL): No  Transportation means:: Regular car, Family     Hindu or spiritual beliefs that impact treatment:: No  Mental health DX:: Yes  Mental health DX how managed:: Medication  Chemical Dependency Status: No Current Concerns  Informal Support system:: Children, Friends, Family     Daughter is overwhelmed with mom's care. She has been caring for mom for many years. She and her  took her out to eat last night.  mentioned that she maybe should stop driving and patient got very upset.  Patient seemed impulsive as she said that she may want to sell her house.  Daughter encouraged her to not make any sudden changes, and to allow the medications to work.  She has lived in assisted living twice in the past. She did not socialize with others even in [facility].    Patient thinks that her friend, Monique, a nurse who is from her [employer] can help her with her meds as she was doing before.  Daughter called Monique and asked her to not be involved in patient's medication management.      Daughter wants to get [employer] set up but is unsure if mom will use it. Mom expects that daughter be available to take her to all her appointments and daughter is not available.  She works and has kids and a grandchild.      Patient wants to have people around, but is not social.      Report was made to [employer] on 7- and letter was received from [employer] stating that they will not be assigning a worker for assessment.  Signed by Chucho Vincent [phone].  Daughter  also received the same letter.      Resources and Interventions:  Current Resources:      Community Resources: None  Supplies Currently Used at Home: Hearing Aid Batteries  Equipment Currently Used at Home: none  Employment Status: retired         Advance Care Plan/Directive  Advanced Care Plans/Directives on file:: Yes  Type Advanced Care Plans/Directives: Advanced Directive - On File  Advanced Care Plan/Directive Status: Declined Further Information    Referrals Placed: None       Care Plan:  Care Plan: Transportation       Problem: Lack of transportation       Goal: Establish reliable transportation through [employer]       Start Date: 8/1/2023 Expected End Date: 5/1/2024    Priority: Medium    Note:     Barriers: none noted.   Strengths: daughter can assist with application.   Patient expressed understanding of goal: daughter does  Action steps to achieve this goal:  1. Daughter will print the application and will complete patient part.   2. Daughter will work with PCP or pain clinic to ask to have the professional portion completed.   3. I will report progress towards this goal at scheduled outreach telephone calls from the CCC team.                                Care Plan: Medication Regimen       Problem: Medication Adherence       Long-Range Goal: I will take my medications as prescribed.       Start Date: 8/1/2023 Expected End Date: 7/31/2024    Priority: High    Note:     Barriers: doesn't always take her medications as prescribed.   Strengths: daughter can assist.   Patient expressed understanding of goal: daughter does  Action steps to achieve this goal:  1. Daughter will work with MTM to understand current medication regime.  2. Daughter will work with PCP and pain clinic to see if a psychiatrist is needed.    3. Daughter will report progress towards this goal at scheduled outreach telephone calls from the CCC team.                               Care Plan: Help At Home       Problem: Insufficient  In-home support       Long-Range Goal: I have enough support at home to live independently.       Start Date: 8/1/2023 Expected End Date: 7/31/2024    Priority: High    Note:     Barriers: patient may not accept.   Strengths: daughter is supportive  Patient expressed understanding of goal: daughter does  Action steps to achieve this goal:  1. Daughter will monitor patient's needs.  2. Daughter will work with care team to get needed services.   3. Daughter will report progress towards this goal at scheduled outreach telephone calls from the CCC team.                                  Patient/Caregiver understanding: daughter enrolled in care coordination.      Outreach Frequency: weekly  Future Appointments                Tomorrow Nikunj Lynn MD [facility], [facility]    In 2 days [facility] ENDOCRINOLOGY CSS [facility], [facility]    In 1 week John Flores, PharmD [facility], [facility]    In 2 weeks WW ECHO OP; [facility] HC ECHO STAFF [facility], [facility]    In 4 weeks Caitlyn Rees MD [facility], [facility]    In 4 months Caroline Sumner NP [facility], [facility]            Plan: CC SW to contact daughter in one week.

## 2023-08-02 ENCOUNTER — VIRTUAL VISIT (OUTPATIENT)
Dept: PALLIATIVE MEDICINE | Facility: OTHER | Age: 83
End: 2023-08-02
Attending: ANESTHESIOLOGY
Payer: MEDICARE

## 2023-08-02 DIAGNOSIS — M54.81 BILATERAL OCCIPITAL NEURALGIA: ICD-10-CM

## 2023-08-02 DIAGNOSIS — Z79.891 LONG TERM (CURRENT) USE OF OPIATE ANALGESIC: Primary | ICD-10-CM

## 2023-08-02 DIAGNOSIS — F31.12 BIPOLAR AFFECTIVE DISORDER, CURRENTLY MANIC, MODERATE (H): ICD-10-CM

## 2023-08-02 PROCEDURE — 99214 OFFICE O/P EST MOD 30 MIN: CPT | Mod: VID | Performed by: ANESTHESIOLOGY

## 2023-08-02 RX ORDER — OLANZAPINE 15 MG/1
15 TABLET ORAL AT BEDTIME
Qty: 28 TABLET | Refills: 3 | Status: SHIPPED | OUTPATIENT
Start: 2023-08-02

## 2023-08-02 RX ORDER — DIVALPROEX SODIUM 500 MG/1
500 TABLET, DELAYED RELEASE ORAL 2 TIMES DAILY
Qty: 56 TABLET | Refills: 1 | Status: SHIPPED | OUTPATIENT
Start: 2023-08-02 | End: 2023-09-28

## 2023-08-02 RX ORDER — VENLAFAXINE HYDROCHLORIDE 75 MG/1
75 CAPSULE, EXTENDED RELEASE ORAL DAILY
Qty: 28 CAPSULE | Refills: 1 | Status: SHIPPED | OUTPATIENT
Start: 2023-08-02 | End: 2023-09-28

## 2023-08-02 RX ORDER — LEVETIRACETAM 250 MG/1
250 TABLET ORAL 4 TIMES DAILY
Qty: 112 TABLET | Refills: 1 | Status: SHIPPED | OUTPATIENT
Start: 2023-08-02 | End: 2024-01-11

## 2023-08-02 ASSESSMENT — PAIN SCALES - GENERAL: PAINLEVEL: MODERATE PAIN (4)

## 2023-08-02 NOTE — PROGRESS NOTES
Patient presents to the clinic today for a follow up with ARELIS FITZPATRICK MD  regarding Pain Management.     Sandy is a 82 year old who is being evaluated via a billable video visit.      How would you like to obtain your AVS? MyChart  If the video visit is dropped, the invitation should be resent by: Text to cell phone: [phone] or [employer] phone alternative contacts     Viola Benton (Daughter)  [phone]     Will anyone else be joining your video visit? No        Video-Visit Details    Type of service:  Video Visit   Video Start Time:   Video End Time:    Originating Location (pt. Location): Home    Distant Location (provider location):  On-site  Platform used for Video Visit: Doximity         Is Pt currently in [place]? Yes    NOTE:  If Pt is not in [place], Appointment needs to be canceled and rescheduled           UDS/CSA-          QUESTIONS:    Sammie Go MA  [facility]

## 2023-08-02 NOTE — PROGRESS NOTES
[facility] - Office Visit    ASSESSMENT & PLAN     aSndy was seen today for pain and pain management.    Diagnoses and all orders for this visit:    Long term (current) use of opiate analgesic  -     Cancel: Ethanol urine; Future  -     Cancel: Drug Confirmation Panel Urine with Creat; Future        Patient Instructions     [facility]    Clinic Number:  [phone]  Call with any questions about your care and for scheduling assistance.   Calls are returned Monday through Friday between 8 AM and 4:30 PM. We usually get back to you within 2 business days depending on the issue/request.    If we are prescribing your medications:  For opioid medication refills, call the clinic or send a [employer] message 7 days in advance.  Please include:  Name of requested medication  Name of the pharmacy.  For non-opioid medications, call your pharmacy directly to request a refill. Please allow 3-4 days to be processed.   Per [place] State Law:  All controlled substance prescriptions must be filled within 30 days of being written.    For those controlled substances allowing refills, pickup must occur within 30 days of last fill.      We believe regular attendance is key to your success in our program!    Any time you are unable to keep your appointment we ask that you call us at least 24 hours in advance to cancel.This will allow us to offer the appointment time to another patient.   Multiple missed appointments may lead to dismissal from the clinic.       -----  ARELIS FITZPATRICK MD  [facility]       SUBJECTIVE      Sandy Spencer is a 82 year old year old female seen by video for overlapping pain conditions including chronic regional pain syndrome, headaches, also traumatic brain injury, mood disorder.    Discussion with patient's daughter Elan/Keith, concerned that patient was having psychiatric decompensation.  She was admitted 720 from the emergency room,  discharged 731.  Described as psychosis and josé miguel to baseline.  Was discharged on Zyprexa 15 mg, Depakote 500 mg twice a day, Effexor 75 mg daily, continued on the Keppra 500 mg 2 tablets twice a day.    Patient seen via video with her daughter.  She describes her main concern is poor sleep.  Reports was on trazodone and a pain pill in the hospital.  Since home is not sleeping well.  Discharge summary indicated trazodone was to be discontinued.    Daughter noted was given methocarbamol, patient clarified not a pain pill about a muscle relaxant.    Patient is upset that her daughter has gathered medications in her home some 5 years old and wants to throw them out.  These include ketamine nasal spray, lidocaine.  A fentanyl patch was found, and was found to weeks ago.    Daughter reports ports that diagnosis was made of bipolar disorder type I and a personality disorder.  She is concerned patient has described things such as selling her house as she was embarrassed that the police department came to pick her up.  Daughter indicates they told the neighbor she was taking for urinary tract infection.  Patient had made statements of selling her house and buying an RV, patient states she was joking.    There where the discharge plan is to see a primary psychiatrist.  They asked about referrals.  Patient notes she had seen [facility] initially when she moved back to [facility].    Daughter indicates the patient's pain complaints, at age 83 should reflect that she is being uncomfortable getting older.    We clarified the above psychotropic medications.    Assisted living has been discussed no daughter notes when the patient did live there before she did not participate in the active social programming.  Other times is talked about being lonely.    Patient is asking to resume her diuretic.  I indicated any changes in her medical prescriptions should be coordinated with the discharge plan and her medical providers, they initially  assessed for UTI but was not thought to be the case.    Patient is to be taking Tylenol 1000 mg twice a day for her pain.  She indicates is not helpful.  She wonders what can be done for her sleep and pain.    I reviewed that at present if she was felt to be stable on these medications we should continue with these and assess again at another interval.      Current Outpatient Medications:     albuterol (PROAIR HFA/PROVENTIL HFA/VENTOLIN HFA) 108 (90 Base) MCG/ACT inhaler, Inhale 2 puffs into the lungs every 6 hours, Disp: 18 g, Rfl: 4    allopurinol (ZYLOPRIM) 100 MG tablet, Take 1 tablet (100 mg) by mouth 2 times daily, Disp: 60 tablet, Rfl: 3    apixaban ANTICOAGULANT (ELIQUIS) 5 MG tablet, Take 1 tablet (5 mg) by mouth 2 times daily, Disp: 180 tablet, Rfl: 3    apixaban ANTICOAGULANT (ELIQUIS) 5 MG tablet, Take 1 tablet (5 mg) by mouth 2 times daily, Disp: 180 tablet, Rfl: 3    azelastine (ASTELIN) 0.1 % nasal spray, 2 sprays each nostril 1-2x daily as needed for nasal congestion (use nightly for first 2 week), Disp: 30 mL, Rfl: 11    Cholecalciferol (VITAMIN D-3) 125 MCG (5000 UT) TABS, Take 5,000 Units by mouth daily, Disp: , Rfl:     divalproex sodium delayed-release (DEPAKOTE) 125 MG DR tablet, Take 1 tablet (125 mg) by mouth 2 times daily, Disp: 60 tablet, Rfl: 3    ipratropium - albuterol 0.5 mg/2.5 mg/3 mL (DUONEB) 0.5-2.5 (3) MG/3ML neb solution, Take 1 vial by nebulization every 6 hours as needed for shortness of breath, wheezing or cough, Disp: , Rfl:     ketamine HCl POWD, 5% nasal spray one spray each nostril every six hours as needed, 30 ml bottle, Disp: 30 g, Rfl: 1    levETIRAcetam (KEPPRA) 250 MG tablet, Take 1 tablet (250 mg) by mouth 4 times daily, Disp: 360 tablet, Rfl: 1    levothyroxine (SYNTHROID/LEVOTHROID) 50 MCG tablet, Take 1 tablet (50 mcg) by mouth daily, Disp: 90 tablet, Rfl: 3    Lidocaine (LIDOCARE) 4 % Patch, Place 2 patches onto the skin every 24 hours To prevent lidocaine  toxicity, patient should be patch free for 12 hrs daily., Disp: 60 patch, Rfl: 5    Lidocaine (LIDOCARE) 4 % Patch, Place 1 patch onto the skin every 24 hours To prevent lidocaine toxicity, patient should be patch free for 12 hrs daily., Disp: 15 patch, Rfl: 0    Lidocaine Base POWD, 1 spray 3 times daily as needed 4% topical solution in 30 ml nasal spray bottle, Disp: 30 g, Rfl: 3    methocarbamol (ROBAXIN) 500 MG tablet, Take 500 mg by mouth 2 times daily, Disp: , Rfl:     methylphenidate (RITALIN) 10 MG tablet, Take 1.5 tablets (15 mg) by mouth 2 times daily, Disp: 90 tablet, Rfl: 0    rosuvastatin (CRESTOR) 40 MG tablet, Take 1 tablet (40 mg) by mouth daily, Disp: 90 tablet, Rfl: 3    traZODone (DESYREL) 100 MG tablet, Take 4 tablets (400 mg) by mouth At Bedtime, Disp: 360 tablet, Rfl: 1    valACYclovir (VALTREX) 500 MG tablet, Take 1 tablet (500 mg) by mouth daily, Disp: 90 tablet, Rfl: 3    venlafaxine (EFFEXOR XR) 150 MG 24 hr capsule, Take 1 capsule (150 mg) by mouth daily, Disp: 90 capsule, Rfl: 1    Current Facility-Administered Medications:     denosumab (PROLIA) injection 60 mg, 60 mg, Subcutaneous, Q6 Months, Caroline Sumner, NP, 60 mg at 01/25/23 1359      Review of Systems   General, psych, musculoskeletal, bowels and bladder otherwise normal other than above.          OBJECTIVE         Physical Exam  General: She is alert.  No respiratory distress.  Cardiovascular:   Lungs: Pulmonary effort is normal, speaking in full sentences  MSK:  Skin:  No concerning rashes or lesions.  Neurologic:  Psychiatric: Speech is somewhat pressured.  Irritable with her daughter.  Her daughter acknowledges that there has been conflict around the issue being power of  and there are some other complex.    Assessment: Bipolar spectrum disorder, recent admission discharge with some stabilization.  Of concern patient reports continued poor sleep requesting trazodone which was specifically discontinued on  discharge.    Wendy that we will look into establishing with a primary psychiatrist.    I will refill the medications at discharge daughter indicates that we will set up her meds weekly.    Total time 35 minutes.  Patient at home via Doximity      Total time spent:  minutes

## 2023-08-02 NOTE — TELEPHONE ENCOUNTER
OLU Velázquez.     Pt has MTM visit scheduled 8/14 @ 2pm. IF needing to move that sooner, contact MTM Scheduling Line: [phone]. Depending on availability, may need to schedule with a different MTM practitioner.

## 2023-08-02 NOTE — PATIENT INSTRUCTIONS
[facility] Number:  [phone]  Call with any questions about your care and for scheduling assistance.   Calls are returned Monday through Friday between 8 AM and 4:30 PM. We usually get back to you within 2 business days depending on the issue/request.    If we are prescribing your medications:  For opioid medication refills, call the clinic or send a [employer] message 7 days in advance.  Please include:  Name of requested medication  Name of the pharmacy.  For non-opioid medications, call your pharmacy directly to request a refill. Please allow 3-4 days to be processed.   Per [place] State Law:  All controlled substance prescriptions must be filled within 30 days of being written.    For those controlled substances allowing refills, pickup must occur within 30 days of last fill.      We believe regular attendance is key to your success in our program!    Any time you are unable to keep your appointment we ask that you call us at least 24 hours in advance to cancel.This will allow us to offer the appointment time to another patient.   Multiple missed appointments may lead to dismissal from the clinic.       Plan:    Will continue on medications as a discharge from the recent hospitalization including Zyprexa (olanzapine) 15 mg at bedtime    Depakote ( divalproex) 500 mg twice a day.    Keppra (levacitram) 500 mg 2 tablets twice a day.    Venlafaxine (Effexor) 75 mg daily.    We will look at establishing with a primary psychiatrist, perhaps with [facility].    Follow-up with Dr. Lynn in the office in 2 weeks

## 2023-08-03 ENCOUNTER — ALLIED HEALTH/NURSE VISIT (OUTPATIENT)
Dept: ENDOCRINOLOGY | Facility: CLINIC | Age: 83
End: 2023-08-03
Payer: MEDICARE

## 2023-08-03 ENCOUNTER — PATIENT OUTREACH (OUTPATIENT)
Dept: CARE COORDINATION | Facility: CLINIC | Age: 83
End: 2023-08-03

## 2023-08-03 DIAGNOSIS — M81.0 OSTEOPOROSIS, UNSPECIFIED OSTEOPOROSIS TYPE, UNSPECIFIED PATHOLOGICAL FRACTURE PRESENCE: Primary | ICD-10-CM

## 2023-08-03 PROCEDURE — 99207 PR NO CHARGE NURSE ONLY: CPT

## 2023-08-03 PROCEDURE — 96372 THER/PROPH/DIAG INJ SC/IM: CPT | Performed by: NURSE PRACTITIONER

## 2023-08-03 ASSESSMENT — ENCOUNTER SYMPTOMS
NAIL CHANGES: 1
EYE REDNESS: 0
SHORTNESS OF BREATH: 1
SINUS CONGESTION: 1
SKIN CHANGES: 0
SMELL DISTURBANCE: 0
DYSPNEA ON EXERTION: 1
DOUBLE VISION: 0
TROUBLE SWALLOWING: 1
ALTERED TEMPERATURE REGULATION: 1
HEMOPTYSIS: 0
WEIGHT GAIN: 1
POLYDIPSIA: 0
POOR WOUND HEALING: 0
DECREASED APPETITE: 0
TASTE DISTURBANCE: 0
WHEEZING: 0
SPUTUM PRODUCTION: 1
EYE PAIN: 0
COUGH: 1
SINUS PAIN: 1
HOARSE VOICE: 1
COUGH DISTURBING SLEEP: 0
EYE IRRITATION: 1
FEVER: 0
SNORES LOUDLY: 1
CHILLS: 1
SORE THROAT: 1
NECK MASS: 0
EYE WATERING: 0
FATIGUE: 1
HALLUCINATIONS: 0
WEIGHT LOSS: 1
POSTURAL DYSPNEA: 1
INCREASED ENERGY: 1

## 2023-08-03 NOTE — PROGRESS NOTES
Clinic Administered Medication Documentation      Prolia Documentation    Indication: Prolia  (denosumab) is a prescription medicine used to treat osteoporosis in patients who:   Are at high risk for fracture, meaning patients who have had a fracture related to osteoporosis, or who have multiple risk factors for fracture.  Cannot use another osteoporosis medicine or other osteoporosis medicines did not work well.  The timeline for early/late injections would be 4 weeks early and any time after the 6 month boubacar. If a patient receives their injection late, then the subsequent injection would be 6 months from the date that they actually received the injection.    When was the last injection?  23  Was the last injection at least 6 months ago? Yes  Has the prior authorization been completed?  Yes  Is there an active order (written within the past 365 days, with administrations remaining, not ) in the chart?  Yes  Patient denies any dental work involving the bone (e.g. tooth extraction or dental implants) in the past 4 weeks?  Yes  Patient denies plans for any dental work involving the bone (e.g. tooth extraction or dental implants) in the next 4 weeks? Yes    The following steps were completed to comply with the REMS program for Prolia:  Reviewed information in the Medication Guide and Patient Counseling Chart, including the serious risks of Prolia  and the symptoms of each risk.  Advised patient to seek prompt medical attention if they have signs or symptoms of any of the serious risks.  Provided each patient a copy of the Medication Guide and Patient Brochure.    Prior to injection, verified patient identity using patient's name and date of birth. Medication was administered. Please see MAR and medication order for additional information. Patient instructed to remain in clinic for 15 minutes and report any adverse reaction to staff immediately.    Vial/Syringe: Syringe  Was this medication supplied by the  patient? No  Verified that the patient has refills remaining in their prescription.

## 2023-08-03 NOTE — PROGRESS NOTES
Clinic Care Coordination Contact  Follow Up Progress Note      Assessment: talked to Melania who was with patient at pain clinic appt.  Provider did not make any changes to her prescriptions even though patient pushed to get something to help her sleep. She has PCP appt tomorrow.  Melania plans on reviewing her medications at this appt.  Patient is not taking her meds right and is not doing much at home.  She said she is doing things, but Melania is not seeing her take care of herself and her home.  She is sitting around.  Melania knows that she is going to have to move her to assisted living and thinks the [facility] would be a good place as it has levels of care there. Mom has about $18,000 and her house as her asset.      They are going to get a psychiatrist through [facility] where patient previously had psychiatry.  Melania is unsure that mom will follow through with appts.      Melania has a message from MT that she will return. She wonders if mom could order medications that have Dc'd.  Encouraged her to discuss with MTM.      Care Gaps:    Health Maintenance Due   Topic Date Due    COPD ACTION PLAN  Never done    COVID-19 Vaccine (1) Never done    MEDICARE ANNUAL WELLNESS VISIT  Never done       Postponed to tomorrow.      Care Plans  Care Plan: Transportation       Problem: Lack of transportation       Goal: Establish reliable transportation through [employer]       Start Date: 8/1/2023 Expected End Date: 5/1/2024    This Visit's Progress: 0%    Priority: Medium    Note:     Barriers: none noted.   Strengths: daughter can assist with application.   Patient expressed understanding of goal: daughter does  Action steps to achieve this goal:  1. Daughter will print the application and will complete patient part.   2. Daughter will work with PCP or pain clinic to ask to have the professional portion completed.   3. I will report progress towards this goal at scheduled outreach telephone calls from the [facility]  team.                                Care Plan: Medication Regimen       Problem: Medication Adherence       Long-Range Goal: I will take my medications as prescribed.       Start Date: 8/1/2023 Expected End Date: 7/31/2024    This Visit's Progress: 10%    Priority: High    Note:     Barriers: doesn't always take her medications as prescribed.   Strengths: daughter can assist.   Patient expressed understanding of goal: daughter does  Action steps to achieve this goal:  1. Daughter will work with MTM to understand current medication regime.  2. Daughter will work with PCP and pain clinic to see if a psychiatrist is needed.    3. Daughter will report progress towards this goal at scheduled outreach telephone calls from the [facility] team.                               Care Plan: Help At Home       Problem: Insufficient In-home support       Long-Range Goal: I have enough support at home to live independently.       Start Date: 8/1/2023 Expected End Date: 7/31/2024    This Visit's Progress: 10%    Priority: High    Note:     Barriers: patient may not accept.   Strengths: daughter is supportive  Patient expressed understanding of goal: daughter does  Action steps to achieve this goal:  1. Daughter will monitor patient's needs.  2. Daughter will work with care team to get needed services.   3. Daughter will report progress towards this goal at scheduled outreach telephone calls from the [facility] team.                                  Intervention/Education provided during outreach: support.  Discussed guardianship and how it works.  Melania feels called to care for her mom at this time, even though it is difficult with her busy life.      Outreach Frequency: weekly    Plan:     Care Coordinator will follow up in one week and as needed.   Rochelle Weber,   [facility]  [phone]

## 2023-08-04 ENCOUNTER — OFFICE VISIT (OUTPATIENT)
Dept: FAMILY MEDICINE | Facility: CLINIC | Age: 83
End: 2023-08-04
Payer: MEDICARE

## 2023-08-04 VITALS
TEMPERATURE: 98.5 F | SYSTOLIC BLOOD PRESSURE: 138 MMHG | HEART RATE: 81 BPM | WEIGHT: 152 LBS | RESPIRATION RATE: 22 BRPM | OXYGEN SATURATION: 97 % | HEIGHT: 62 IN | BODY MASS INDEX: 27.97 KG/M2 | DIASTOLIC BLOOD PRESSURE: 70 MMHG

## 2023-08-04 DIAGNOSIS — G89.4 CHRONIC PAIN SYNDROME: Primary | ICD-10-CM

## 2023-08-04 DIAGNOSIS — F31.12 BIPOLAR AFFECTIVE DISORDER, CURRENTLY MANIC, MODERATE (H): ICD-10-CM

## 2023-08-04 DIAGNOSIS — Z09 HOSPITAL DISCHARGE FOLLOW-UP: ICD-10-CM

## 2023-08-04 DIAGNOSIS — N18.4 CKD (CHRONIC KIDNEY DISEASE) STAGE 4, GFR 15-29 ML/MIN (H): ICD-10-CM

## 2023-08-04 DIAGNOSIS — I25.119 CORONARY ARTERY DISEASE INVOLVING NATIVE CORONARY ARTERY OF NATIVE HEART WITH ANGINA PECTORIS (H): ICD-10-CM

## 2023-08-04 PROBLEM — R44.3 HALLUCINATIONS: Status: ACTIVE | Noted: 2023-07-21

## 2023-08-04 PROBLEM — F22 PARANOIA (H): Status: ACTIVE | Noted: 2023-07-21

## 2023-08-04 PROBLEM — F31.2 BIPOLAR DISORDER, CURRENT EPISODE MANIC SEVERE WITH PSYCHOTIC FEATURES (H): Status: ACTIVE | Noted: 2023-07-24

## 2023-08-04 PROCEDURE — 99495 TRANSJ CARE MGMT MOD F2F 14D: CPT | Performed by: FAMILY MEDICINE

## 2023-08-04 RX ORDER — TORSEMIDE 20 MG/1
20 TABLET ORAL DAILY
Qty: 30 TABLET | Refills: 5 | Status: SHIPPED | OUTPATIENT
Start: 2023-08-04 | End: 2024-09-03

## 2023-08-04 RX ORDER — ROSUVASTATIN CALCIUM 10 MG/1
30 TABLET, COATED ORAL DAILY
COMMUNITY
Start: 2022-10-07 | End: 2023-08-04

## 2023-08-04 RX ORDER — METHOCARBAMOL 500 MG/1
500 TABLET, FILM COATED ORAL 2 TIMES DAILY
Qty: 60 TABLET | Refills: 1 | Status: SHIPPED | OUTPATIENT
Start: 2023-08-04 | End: 2023-10-27

## 2023-08-04 RX ORDER — OLANZAPINE 10 MG/1
10 TABLET ORAL DAILY
COMMUNITY
Start: 2023-07-31 | End: 2023-08-30

## 2023-08-04 RX ORDER — TORSEMIDE 10 MG/1
20 TABLET ORAL DAILY
COMMUNITY
End: 2023-08-04

## 2023-08-04 RX ORDER — ROSUVASTATIN CALCIUM 10 MG/1
10 TABLET, COATED ORAL DAILY
Qty: 30 TABLET | Refills: 5 | Status: SHIPPED | OUTPATIENT
Start: 2023-08-04 | End: 2024-01-04

## 2023-08-04 RX ORDER — ACETAMINOPHEN 500 MG
1000 TABLET ORAL 3 TIMES DAILY
COMMUNITY
Start: 2023-07-27

## 2023-08-04 ASSESSMENT — PAIN SCALES - GENERAL: PAINLEVEL: NO PAIN (0)

## 2023-08-04 ASSESSMENT — ANXIETY QUESTIONNAIRES
1. FEELING NERVOUS, ANXIOUS, OR ON EDGE: NEARLY EVERY DAY
7. FEELING AFRAID AS IF SOMETHING AWFUL MIGHT HAPPEN: NOT AT ALL
GAD7 TOTAL SCORE: 9
4. TROUBLE RELAXING: MORE THAN HALF THE DAYS
IF YOU CHECKED OFF ANY PROBLEMS ON THIS QUESTIONNAIRE, HOW DIFFICULT HAVE THESE PROBLEMS MADE IT FOR YOU TO DO YOUR WORK, TAKE CARE OF THINGS AT HOME, OR GET ALONG WITH OTHER PEOPLE: SOMEWHAT DIFFICULT
5. BEING SO RESTLESS THAT IT IS HARD TO SIT STILL: SEVERAL DAYS
GAD7 TOTAL SCORE: 9
2. NOT BEING ABLE TO STOP OR CONTROL WORRYING: SEVERAL DAYS
6. BECOMING EASILY ANNOYED OR IRRITABLE: SEVERAL DAYS
3. WORRYING TOO MUCH ABOUT DIFFERENT THINGS: SEVERAL DAYS

## 2023-08-04 ASSESSMENT — PATIENT HEALTH QUESTIONNAIRE - PHQ9
SUM OF ALL RESPONSES TO PHQ QUESTIONS 1-9: 10
10. IF YOU CHECKED OFF ANY PROBLEMS, HOW DIFFICULT HAVE THESE PROBLEMS MADE IT FOR YOU TO DO YOUR WORK, TAKE CARE OF THINGS AT HOME, OR GET ALONG WITH OTHER PEOPLE: SOMEWHAT DIFFICULT
SUM OF ALL RESPONSES TO PHQ QUESTIONS 1-9: 10

## 2023-08-04 NOTE — PATIENT INSTRUCTIONS
The phone number for the driving assessment: To schedule an appointment at any of our locations please call [phone]     Talk to the [facility] pharmacist about blister packed medications, could consider [facility] as well    [facility]  Office [phone]    [facility]  [phone]

## 2023-08-04 NOTE — PROGRESS NOTES
Assessment & Plan     Hospital discharge follow-up  -The patient, her daughter and I spent some time discussing coordination of care, goals of quality of life including her current living situation.  I strongly encouraged her to consider some sort of assisted living facility as I think she is really quite isolated right now and her daughter expresses agreement with this.  They will work on some of the things that I mentioned on below my note, they will keep the following appointments and see me on August 30 as scheduled and at that point we can see how things are going.  -I did discuss with the patient and her daughter considering blister packs I think this is a very good idea.  They will discuss this with this pharmacist as well when they meet with them next week.    CKD (chronic kidney disease) stage 4, GFR 15-29 ml/min (H)  Following with nephrology, needing refill of her torsemide which I provided for today.  - torsemide (DEMADEX) 20 MG tablet  Dispense: 30 tablet; Refill: 5    Bipolar affective disorder, currently manic, moderate (H)  -I think that her symptoms of bipolar explain a lot of her behavior and symptoms over the last 10 years that I have known the patient.  We discussed that she should let the olanzapine work a little bit more before we start making further adjustments.  Continue to work on good sleep hygiene as well.  They are working on getting a psychiatry appointment as well.    Chronic pain syndrome  -Renewal of her methocarbamol today, she does follow-up with Dr. Lynn her pain clinic provider as well in the next week or 2.  - methocarbamol (ROBAXIN) 500 MG tablet  Dispense: 60 tablet; Refill: 1    Coronary artery disease involving native coronary artery of native heart with angina pectoris (H)  -Dose of atorvastatin was decreased by the hospital, will check her labs when she is the next time as I am unsure the reasoning behind this nor can I find documentation of why in the notes.  -  rosuvastatin (CRESTOR) 10 MG tablet  Dispense: 30 tablet; Refill: 5    >70 minutes spent by me on the date of the encounter doing chart review, history and exam, documentation and further activities per the note             MED REC REQUIRED  Post Medication Reconciliation Status: discharge medications reconciled and changed, per note/orders      Caitlyn Rees MD  [facility] KWABENA Delgado is a 82 year old, presenting for the following health issues:  Hospital F/U (Follow up from [facility]. Dates 7/20-7/31)        8/4/2023     2:48 PM   Additional Questions   Roomed by Kusum PLEITEZ CMA   Accompanied by chu- Viola HINOJOSA         8/1/2023     3:16 PM   Post Discharge Outreach   How are your symptoms? (Red Flag symptoms escalate to triage hotline per guidelines) Unchanged   Do you feel your condition is stable enough to be safe at home until your provider visit? Yes   Does the patient have their discharge instructions?  Yes   Does the patient have questions regarding their discharge instructions?  No   Were you started on any new medications or were there changes to any of your previous medications?  Yes   Does the patient have all of their medications? Yes   Do you have questions regarding any of your medications?  No   Do you have all of your needed medical supplies or equipment (DME)?  (i.e. oxygen tank, CPAP, cane, etc.) Yes   Discharge follow-up appointment scheduled within 14 calendar days?  No     Hospital Follow-up Visit:    Hospital/Nursing Home/IP Rehab Facility:  [facility]  Date of Admission: 7/20/23  Date of Discharge: 7/31/23  Reason(s) for Admission: manic    Was your hospitalization related to COVID-19? No   Problems taking medications regularly:  yes  Medication changes since discharge: yes  Problems adhering to non-medication therapy:  yes    Summary of hospitalization:  Discharge summary reviewed from [facility].     She does keep feel anxious, her motor seems  to get reved as she is hurting.She doesn't feel good and this is frustrating. She is still jumpy inside.     Diagnostic Tests/Treatments reviewed.  Follow up needed: none  Other Healthcare Providers Involved in Patient s Care:         Care Coordination, MTM, and pain clinic  Update since discharge: stable.  fluctuating course.         Plan of care communicated with patient and family           The patient comes in today with her daughter to discuss her recent hospital stay.  The patient was admitted to [facility] from July 20 through July 31 secondary to paranoia, hallucinations as well as irritability.  While she was there she was diagnosed with bipolar disorder with josé miguel.  On history review she had 1 similar episode many years ago.      Her daughter was helping her clean out her house while this was happening and found a significant amount of medications that were unused from prior Including medical marijuana, fentanyl patches that she had been prescribed in some time.    There are big questions today are surrounding her medications and needs some refills as well as her current mental health status, and some medication adjustments.  Her atorvastatin was adjusted from 40 mg orally daily down to 10 mg orally daily and they are wondering about this.    She continues to have some difficulty with sleeping at nighttime.  She continues to feel just very antsy inside of her skin as well.  She did have her olanzapine increased to 15 mg at bedtime and discharged with only been a few days with this.    Her daughter does have an upcoming appointment with a psychiatrist at [facility] on September 28 and is looking into other places as well.  They do have a meeting with the [facility] pharmacist on August 7 as well and they are interested in talking about blister packs at that time.      Lastly they have concerns about her safety and driving.  Her daughter does not think that she should be driving on a regular basis,  "Sandy does think that she is safe to drive.    I did review the discharge summary from [facility] as well today.      Review of Systems   Per above      Objective    /70 (BP Location: Right arm, Patient Position: Sitting, Cuff Size: Adult Regular)   Pulse 81   Temp 98.5  F (36.9  C) (Oral)   Resp 22   Ht 1.575 m (5' 2\")   Wt 68.9 kg (152 lb)   LMP  (LMP Unknown)   SpO2 97%   BMI 27.80 kg/m    Body mass index is 27.8 kg/m .  Physical Exam   GENERAL: healthy, alert and no distress  RESP: lungs clear to auscultation - no rales, rhonchi or wheezes  CV: regular rate and rhythm, normal S1 S2, no S3 or S4, no murmur, click or rub, no peripheral edema and peripheral pulses strong  PSYCH: mentation appears normal, anxious, speech pressured, judgement and insight impaired, and appearance well groomed                   "

## 2023-08-07 ENCOUNTER — VIRTUAL VISIT (OUTPATIENT)
Dept: PHARMACY | Facility: CLINIC | Age: 83
End: 2023-08-07
Payer: COMMERCIAL

## 2023-08-07 DIAGNOSIS — I25.10 CORONARY ARTERY DISEASE INVOLVING NATIVE CORONARY ARTERY OF NATIVE HEART WITHOUT ANGINA PECTORIS: ICD-10-CM

## 2023-08-07 DIAGNOSIS — I10 HYPERTENSION, UNSPECIFIED TYPE: ICD-10-CM

## 2023-08-07 DIAGNOSIS — G47.00 INSOMNIA, UNSPECIFIED TYPE: ICD-10-CM

## 2023-08-07 DIAGNOSIS — E03.9 HYPOTHYROIDISM, UNSPECIFIED TYPE: ICD-10-CM

## 2023-08-07 DIAGNOSIS — E79.0 ELEVATED BLOOD URIC ACID LEVEL: ICD-10-CM

## 2023-08-07 DIAGNOSIS — B02.8 HERPES ZOSTER WITH COMPLICATION: ICD-10-CM

## 2023-08-07 DIAGNOSIS — N18.4 CKD (CHRONIC KIDNEY DISEASE) STAGE 4, GFR 15-29 ML/MIN (H): ICD-10-CM

## 2023-08-07 DIAGNOSIS — F31.2 BIPOLAR DISORDER, CURRENT EPISODE MANIC SEVERE WITH PSYCHOTIC FEATURES (H): Primary | ICD-10-CM

## 2023-08-07 DIAGNOSIS — E78.00 HYPERCHOLESTEROLEMIA: ICD-10-CM

## 2023-08-07 DIAGNOSIS — M81.0 OSTEOPOROSIS, UNSPECIFIED OSTEOPOROSIS TYPE, UNSPECIFIED PATHOLOGICAL FRACTURE PRESENCE: ICD-10-CM

## 2023-08-07 DIAGNOSIS — Z86.711 HISTORY OF PULMONARY EMBOLISM: ICD-10-CM

## 2023-08-07 DIAGNOSIS — G89.4 CHRONIC PAIN SYNDROME: ICD-10-CM

## 2023-08-07 DIAGNOSIS — R09.81 NASAL CONGESTION: ICD-10-CM

## 2023-08-07 PROCEDURE — 99207 PR NO CHARGE LOS: CPT

## 2023-08-07 NOTE — PROGRESS NOTES
Medication Therapy Management (MTM) Encounter    ASSESSMENT:                            Medication Adherence/Access: Recommended that Viola follow up with the pharmacies that the patient currently uses and ensure that any non-active orders are placed as inactive orders on the pharmacy end so that Sandy can not order any additional refills for these discontinued orders. Noted to let providers know if they need to call in to have these orders discontinued on the pharmacy end.    Bipolar/Insomnia: Per patient's discharge summary, patient should be taking olanzapine 15mg daily, recommend patient and Viola verify current dosage and increase as recommended based on discharge summary in the patient's chart. Noted that this may potentially also help with sleep. Recommended patient follow with physiatry for next steps on sleep management.     Pain: Would recommend patient try to take Acetaminophen scheduled to see if this can help to better manage pain. Continues to follow with Dr. Lynn and has follow up scheduled for 8/15/2023.    Gout: Would recommend obtaining updated uric acid levels at upcoming PCP visit and if urica acid levels are still elevated above goal of <6, would recommend dose increase of allopurinol gradually increasing dose in less than or equal to 100 mg/day increments every 2 to 4 weeks considering using lower dose increments (50 mg/day) and longer intervals (4 weeks) may be preferred to avoid potential side effects. Will discuss with Dr. Rees.     History of PE: Stable, since patient is prescribed Eliquis for indefinite anticoagulation for prophylaxis against venous thromboembolism recurrence no dose adjustment is recommended by the .     CKD/Hypertension: Stable. Patient reported blood pressures seem to be at goal of <130/80.    Hyperlipidemia/CAD: Plan in place for patient to obtain updated cholesterol labs at upcoming PCP visit since rosuvastatin dose decrease. Of note, based on  patient's current kidney function, doses of rosuvastatin 10mg or more are not recommended, thus if needed a dose increase, may need to change to an alternative statin medication. Will discuss with Dr. Rees.     Osteoporosis: Stable, while not discussed today due to time, patient would benefit from obtaining updated DEXA scan.     Hypothyroidism: Stable, most recent TSH is within normal limits.     Congestion: Stable.     Cold sores: Based on patient's current kidney function, valacyclovir dosing is recommended to be 500 mg every 48 hours, will discuss dose adjustment with Dr. Rees.     PLAN:                            I would recommend following up with the pharmacies and see what prescriptions are currently active and to have them inactivate and medications that have been discontinued so that refills can not be placed going forward.     Start taking Acetaminophen 1000mg three times daily scheduled to see if this helps with pain    Look to see if you are taking olanzapine 10mg daily or 15mg, discharge summary notes should be on 15mg daily at bedtime     I will let Dr. Rees know about the dose of rosuvastatin based on current kidney function, to re-check uric acid at the end of the month, and dose adjustment of valacyclovir based on current kidney function    Follow-up: Return in about 24 days (around 8/31/2023). Lucille to call [facility] about valacyclovir dosing.     SUBJECTIVE/OBJECTIVE:                          Sandy Spencer is a 82 year old female called for a follow-up visit from 7/17/2023 with John Flores PharmD.   The patient was not present for the initial part of the visit today, the initial part of the visit was done with the patient's daughter Viola who has POA. The patient was called in the middle of the visit to review medications with both Viola and myself.     Reason for visit: Hospital discharge review.    Allergies/ADRs: Reviewed in chart  Past Medical History: Reviewed in chart    Medication  Adherence/Access: Patient uses pill box(es).  Patient takes medications 2 time(s) per day in the morning and before bed.   Dony Rod notes that she or another family member will come over on Wednesdays and fill Sandy's medication boxes for 1 week thus the patient does not have access to more than 1 week supply of her current medications. Before being able to connect with Sandy during the phone visit, the daughter notes that Sandy is very attached to her medications to a point where she has found that Sandy has stored away old medications including opioids an other related medications. Viola notes that she just disposed of about 29 of these old medications that she found her mother was keeping. Viola is wondering how she can make it so that Sandy can not fill medications that have been discontinued as this is causing confusion and is not safe for the patient.     Bipolar/Insomnia:   Olanzapine 15mg daily at bedtime (added after hospitalization)- Viola believes the patient is currently taking 10mg as the paper that she got from the patient's most recent hospital discharge summary indicates 10mg and this is what the family has been using to refill/set up the patient's medications.   Depakote 500mg twice daily (dose increase after hospitalization)  Venlafaxine XR 75mg daily (dose reduced after hospitalization)- Sandy notes that higher doses have given her side effects.   Levetiracetam 500mg twice daily (dose adjustment after hospitalization), patient was prescribed 250mg 4 times daily previously   Patient was recently hospitalized for what was thought was UTI but resulted that it was more behavioral.Is seeing a physiatrist at the end of September but Viola is working to see if they can find an appointment sooner.   Before being able to connect with Sandy during the phone visit, Dony Rod notes that Sandy's symptoms have improved but still noticing manic signs and not yet back to baseline. She has also  been saying things such as I just want to rent an RV and travel across the country. Viola was told that the patient's josé miguel symptoms could have resulted as effect of having ketamine and methylphenidate, thus these medications were discontinued at hospital discharge. Patient notes that her sleep has been worsened and has a hard time with getting back to bed if they wake up. She is requesting pain and sleep medications during the visit today and also to Viola at home.   Medications discontinued after hospitalization:   -ketamine, lidocaine, methylphenidate, trazodone (patient notes that this was effective for sleep)    Pain:  Acetaminophen 1000mg three times daily (added after hospitalization)- patient Sandy notes that Acetaminophen does not work that well for pain thus she has not taken this very consistently. Patient has instead been taking as needed and wonders if it would be better to take daily versus as needed.   Methocarbamol 500mg twice daily   Lidocaine patch OTC- patient has not been using since she was in the hospital, has them available if needed.   Notes that pain is usually in knees and elbows but does have other types of pain such as nerve pain all over.   Follows with Dr. Lynn for pain management   Medications discontinued after hospitalization:   -ketamine, lidocaine, methylphenidate, trazodone    Gout:   allopurinol 100mg twice daily   Patient reports no current pain concerns specifically to her toes of fingers. Patient is experiencing the following medication side effects: none.   Uric Acid   Date Value Ref Range Status   06/14/2023 8.0 (H) 2.4 - 5.7 mg/dL Final     History of PE:   Eliquis 5mg twice daily   Denies side effects.   Follows with Dr. Luz Elena Ybarra for cardiology. Was told that she was recommended to be on Eliquis therapy lifelong.     CKD/Hypertension:   Torsemide 20mg daily   Patient reports no current medication side effects.  She notes that her ankles are a little bigger right now  than they typically are. Notes that she is not lightheaded or dizzy anymore.   Patient self-monitors blood pressure, notes that previously her systolic readings were 100's but recently have been a little higher in the 120's/80's before she was in the hospital, has not taken it since she was discharged from the hospital.     Estimated CrCl: 24.3 mL/min (A) Important    24.3 mL/min = 0.85 (female) x (140 - 80 yrs) x 57.6 kg / (72 x 1.68 mg/dL)     BP Readings from Last 3 Encounters:   08/04/23 138/70   07/12/23 (!) 143/82   07/05/23 96/60     Pulse Readings from Last 3 Encounters:   08/04/23 81   07/12/23 118   07/05/23 90     Hyperlipidemia/CAD:   rosuvastatin 10mg daily (does decreased after hospitalization)  Per PCP note on 8/4/2023:   will check her labs when she is the next time as I am unsure the reasoning behind rosuvastatin dose decrease nor can I find documentation of why in the notes.   Patient notes that she was on Lipitor before but this was not a good one for her.   Patient reports no significant myalgias or other side effects.  Recent Labs   Lab Test 03/02/23  1131 11/29/22  1507   CHOL 217* 229*   HDL 80 102    102*   TRIG 118 124     Osteoporosis:   denosumab (Prolia) 60 mg subcutaneous every 6 months (last injection 8/3/2023)  Vitamin D 5000 units daily   Patient is not experiencing side effects.  DEXA History 3/9/2021:  Assessment:  1. The spine bone density L1-L4 with T-score 2.0 and significant artifacts.  2. Femoral bone densities show left femoral neck T- score -1.8 and right femoral neck T-score -1.5.  3. Trabecular bone score indicates good trabecular bone architecture.  4. Left forearm bone density with T-score -2.3.  80 y.o. female with LOW BONE DENSITY (OSTEOPENIA) and HIGH fracture risk, adjusted for the TBS, with major osteoporotic fracture risk 12.9% and hip fracture risk 3.9%  Patient is getting the following sources of dietary calcium: Not able to assess today due to time.   Last  vitamin D level:  Lab Results   Component Value Date    VITDT 39 01/31/2022     Hypothyroidism:   Levothyroxine 50 mcg daily in the morning as soon as waking up.   Patient is having the following symptoms: none.   TSH   Date Value Ref Range Status   06/14/2023 1.19 0.30 - 4.20 uIU/mL Final   06/14/2022 0.60 0.30 - 5.00 uIU/mL Final     Congestion:   Azelastine 0.1% nasal spray as needed, notes that she goes through about 1 bottle a year and that this is effective when needed.   Duonebs as needed- patient notes that they have not been using this.   Albuterol inhaler as needed- patient notes that they use this when air quality is bad, has not used for about 4-5 days. Notes that this is effective when needed.     Cold sores:   Valacyclovir 500mg daily  Denies side effects.     Today's Vitals: LMP  (LMP Unknown)   ----------------  Post Discharge Medication Reconciliation Status: discharge medications reconciled and changed, per note/orders.    I spent 86 minutes with this patient today. I offer these suggestions for consideration by Dr. Rees. A copy of the visit note was provided to the patient's provider(s).    A summary of these recommendations was sent via [employer].    Lucille Butler, PharmD  Medication Therapy Management Pharmacist   [facility]     Telemedicine Visit Details  Type of service:  Telephone visit  Start Time:  9:00 AM  End Time: 10:26 AM     Medication Therapy Recommendations  Bipolar disorder, current episode manic severe with psychotic features (H)    Current Medication: OLANZapine (ZYPREXA) 10 MG tablet   Rationale: Dose too low - Dosage too low - Effectiveness   Recommendation: Increase Dose   Status: Patient Agreed - Adherence/Education         Chronic pain    Current Medication: acetaminophen (TYLENOL) 500 MG tablet   Rationale: Frequency inappropriate - Dosage too low - Effectiveness   Recommendation: Increase Frequency   Status: Patient Agreed -  Adherence/Education          Current Medication: methocarbamol (ROBAXIN) 500 MG tablet   Rationale: Does not understand instructions - Adherence - Adherence   Recommendation: Provide Education - Educated daughter to have pharmacy inactivate discontinued medications   Status: Patient Agreed - Adherence/Education         Herpes zoster with complication    Current Medication: valACYclovir (VALTREX) 500 MG tablet   Rationale: Dose too high - Dosage too high - Safety   Recommendation: Decrease Dose   Status: Contact Provider - Awaiting Response

## 2023-08-07 NOTE — Clinical Note
Hi Dr. Rese,   I wanted to let you know that based on Sandy's current kidney function it is recommended to decrease the frequency to 500mg every 48 hours, would you recommend she decrease to this and take daily or change her regimen to just as needed when she has a flare?   Additionally based on her cholesterol results at the end of the month, she may need to change to a different statin as it is recommended to avoid increasing dose above 10mg based on her kidney function.  We also discussed she would benefit from getting updated uric acid levels when you check cholesterol to determine if allopurinol needs to be dose increased.   Thanks,  Lucille Butler, PharmD Medication Therapy Management Pharmacist  [facility]

## 2023-08-07 NOTE — Clinical Note
Elizabeth Lopez,   I met with patient and mom today, I was going to recommend follow up after upcoming PCP and Shane appts, let me know if you would like to reach out to the patient or if I should.   Thanks,  Lucille Butler, PharmD Medication Therapy Management Pharmacist  [facility]

## 2023-08-07 NOTE — PATIENT INSTRUCTIONS
"Recommendations from today's MTM visit:                                                    I would recommend following up with the pharmacies and see what prescriptions are currently active and to have them inactivate and medications that have been discontinued so that refills can not be placed going forward.     Start taking Acetaminophen 1000mg three times daily scheduled to see if this helps with pain    Look to see if you are taking olanzapine 10mg daily or 15mg, discharge summary notes should be on 15mg daily at bedtime     I will let Dr. Rees know about the dose of rosuvastatin based on current kidney function, to re-check uric acid at the end of the month, and dose adjustment of valacyclovir based on current kidney function    Follow-up: Return in about 24 days (around 8/31/2023). Lucille to call [facility] about valacyclovir dosing.     It was great speaking with you today.  I value your experience and would be very thankful for your time in providing feedback in our clinic survey. In the next few days, you may receive an email or text message from [facility] [employer] with a link to a survey related to your  clinical pharmacist.\"     To schedule another MTM appointment, please call the clinic directly or you may call the MTM scheduling line at [phone] or toll-free at [phone].     My Clinical Pharmacist's contact information:                                                      Please feel free to contact me with any questions or concerns you have.      Lucille Butler, PharmD  Medication Therapy Management Pharmacist   [facility]    "

## 2023-08-08 ENCOUNTER — PATIENT OUTREACH (OUTPATIENT)
Dept: CARE COORDINATION | Facility: CLINIC | Age: 83
End: 2023-08-08
Payer: MEDICARE

## 2023-08-08 NOTE — PROGRESS NOTES
Clinic Care Coordination Contact  8-9-2023 Called [facility] and they do not work with geriatric patients. Called [facility] and [facility] for Psychology and neither have therapists available.      Called Melania and reviewed. She is disappointed and will likely keep the appt at [facility] to see if they can provide the therapy.  She also will keep the [facility] appt on the calendar.      She asked for recommendation for med dispenser and was given referral to [facility] for med dispenser.  Patient apparently is on a waiver and gets meals though waiver. Daughter is not aware of any  as patient did this on her own. Daughter will ask for name of  as a med dispenser may be covered.  Melania completed the [employer] application and is finishing up the forms for assisted living at [facility].      Plan- assist daughter as needed.      Follow Up Progress Note      Assessment: Call from daughter Melania.  Mom has agreed to move into [facility] and Melania has checked on [facility] and [facility].  Mom used to work for [employer] and may be a priority.  Melania may also check out [email or web address].  She has about $18,000 that is liquid and her house, which she may be able to get around$175,000.  Discussed moving mom prior to selling the house as that will take some time.  PCP recommended that she get a 's test to continue to drive. Mom refused. Her car did not start and she was frustrated at family. Mom wants to drive until winter when she will sell her car.     Melania learned of a therapist at [facility] who specializes in trauma and bi polar diagnosis.  She called [facility] and she has to have intake there and then they would review to see if she can be treated there. She set up intake for 8-21. Unsure if they could also do psych.  Melania would prefer to have one location for psych and therapy.  Writer agreed to call some options to help locate options.  Called [facility] and they don't have therapy.  Same for  [facility] and [facility].  All would offer med prescribers.     They found many more bottles of medications stashed throughout her house and they took them out of the house.  She has been monitoring how mom is taking her meds after Melania set up pill box for a week. She is not taking as she should. Took twice as much as she should on Sunday, and some meds are missing from her Thursday set up.  She is considering getting a med dispenser to help her.      Discussed how to complete the [employer] application.  Melania will discuss with mom tomorrow at dinner.     Care Gaps:    Health Maintenance Due   Topic Date Due    COPD ACTION PLAN  Never done    COVID-19 Vaccine (1) Never done    MEDICARE ANNUAL WELLNESS VISIT  Never done       Postponed to next appt.      Care Plans  Care Plan: Transportation       Problem: Lack of transportation       Goal: Establish reliable transportation through [employer]       Start Date: 8/1/2023 Expected End Date: 5/1/2024    This Visit's Progress: 10% Recent Progress: 0%    Priority: Medium    Note:     Barriers: none noted.   Strengths: daughter can assist with application.   Patient expressed understanding of goal: daughter does  Action steps to achieve this goal:  1. Daughter will print the application and will complete patient part.   2. Daughter will work with PCP or pain clinic to ask to have the professional portion completed.   3. I will report progress towards this goal at scheduled outreach telephone calls from the CCC team.                                Care Plan: Medication Regimen       Problem: Medication Adherence       Long-Range Goal: I will take my medications as prescribed.       Start Date: 8/1/2023 Expected End Date: 7/31/2024    This Visit's Progress: 10% Recent Progress: 10%    Priority: High    Note:     Barriers: doesn't always take her medications as prescribed.   Strengths: daughter can assist.   Patient expressed understanding of goal: daughter does  Action steps  to achieve this goal:  1. Daughter will work with MTM to understand current medication regime.  2. Daughter will work with PCP and pain clinic to see if a psychiatrist is needed.    3. Daughter will report progress towards this goal at scheduled outreach telephone calls from the CCC team.                               Care Plan: Help At Home       Problem: Insufficient In-home support       Long-Range Goal: I have enough support at home to live independently.       Start Date: 8/1/2023 Expected End Date: 7/31/2024    This Visit's Progress: 10%    Priority: High    Note:     Barriers: patient may not accept.   Strengths: daughter is supportive  Patient expressed understanding of goal: daughter does  Action steps to achieve this goal:  1. Daughter will monitor patient's needs.  2. Daughter will work with care team to get needed services.   3. Daughter will report progress towards this goal at scheduled outreach telephone calls from the CCC team.                                  Intervention/Education provided during outreach: support and help with mental health providers.      Outreach Frequency: weekly    Plan:   Call other options for mental health support.      Clinic Care      Contact  [facility]/Voicemail    Referral Source: Care Team  Clinical Data:  Outreach- daughter left VM asking for a call back to discuss psychiatry options instead of [facility] which scheduled appt in later September. Patient has completed PCP and MTM appt with her daughter.   Outreach attempted x 1.  Left message on voicemail with call back information and requested return call.  Plan:  will try to reach daughter again in 3-5 business days.  Rochelle Weber,   [facility]  [phone]

## 2023-08-09 ASSESSMENT — ACTIVITIES OF DAILY LIVING (ADL): DEPENDENT_IADLS:: INDEPENDENT;MEDICATION MANAGEMENT;TRANSPORTATION

## 2023-08-13 ASSESSMENT — PAIN SCALES - PAIN ENJOYMENT GENERAL ACTIVITY SCALE (PEG)
AVG_PAIN_PASTWEEK: 5
PEG_TOTALSCORE: 5
INTERFERED_ENJOYMENT_LIFE: 4
INTERFERED_GENERAL_ACTIVITY: 6

## 2023-08-14 ENCOUNTER — TELEPHONE (OUTPATIENT)
Dept: FAMILY MEDICINE | Facility: CLINIC | Age: 83
End: 2023-08-14
Payer: MEDICARE

## 2023-08-14 NOTE — TELEPHONE ENCOUNTER
Forms Request  Name of form/paperwork: [facility]  Have you been seen for this request:   Do we have the form: ON DR ZAFAR'S DESK  When is form needed by: WHEN DONE  How would you like the form returned: [phone]  Patient Notified form requests are processed in 3-5 business days: YES    Okay to leave a detailed message?

## 2023-08-15 ENCOUNTER — OFFICE VISIT (OUTPATIENT)
Dept: PALLIATIVE MEDICINE | Facility: OTHER | Age: 83
End: 2023-08-15
Attending: ANESTHESIOLOGY
Payer: MEDICARE

## 2023-08-15 VITALS — DIASTOLIC BLOOD PRESSURE: 77 MMHG | OXYGEN SATURATION: 96 % | HEART RATE: 86 BPM | SYSTOLIC BLOOD PRESSURE: 145 MMHG

## 2023-08-15 DIAGNOSIS — M54.16 LUMBAR RADICULOPATHY: Primary | ICD-10-CM

## 2023-08-15 DIAGNOSIS — G47.00 INSOMNIA, UNSPECIFIED TYPE: ICD-10-CM

## 2023-08-15 PROCEDURE — 99213 OFFICE O/P EST LOW 20 MIN: CPT | Performed by: ANESTHESIOLOGY

## 2023-08-15 PROCEDURE — G0463 HOSPITAL OUTPT CLINIC VISIT: HCPCS | Performed by: ANESTHESIOLOGY

## 2023-08-15 ASSESSMENT — PAIN SCALES - GENERAL: PAINLEVEL: MILD PAIN (3)

## 2023-08-15 NOTE — PROGRESS NOTES
Patient presents to the clinic today for a visit  with ARELIS FITZPATRICK MD            4/21/2023     2:50 PM 6/23/2023    12:48 PM 8/15/2023    10:30 AM   PEG Score   PEG Total Score 7 6.33 4       UDS/CSA-6/23/23    Medications-    QUESTIONS:    Daina Quinn MA  [facility]

## 2023-08-15 NOTE — PATIENT INSTRUCTIONS
[facility] - [facility] Number:  [phone]  Call with any questions about your care and for scheduling assistance.   Calls are returned Monday through Friday between 8 AM and 4:30 PM. We usually get back to you within 2 business days depending on the issue/request.    If we are prescribing your medications:  For opioid medication refills, call the clinic or send a [employer] message 7 days in advance.  Please include:  Name of requested medication  Name of the pharmacy.  For non-opioid medications, call your pharmacy directly to request a refill. Please allow 3-4 days to be processed.   Per [place] State Law:  All controlled substance prescriptions must be filled within 30 days of being written.    For those controlled substances allowing refills, pickup must occur within 30 days of last fill.      We believe regular attendance is key to your success in our program!    Any time you are unable to keep your appointment we ask that you call us at least 24 hours in advance to cancel.This will allow us to offer the appointment time to another patient.   Multiple missed appointments may lead to dismissal from the clinic.       PLAN:    At check out may schedule with Dr. Gil for right bursitis injection.    Referral made for acupuncture at [facility].  You may also contact [facility] in [facility] for acupuncture and frequency specific microcurrent near you at [phone].    Discussed herbal anti-inflammatories such as high-dose tumeric to help with pain and other resources offered.    You may sign a release of information for psychiatric care today, and discussed you have psychiatric evaluations coming up.    Tizanidine to help with sleep, 4 mg 1 tablet at bedtime and may repeat after 4 hours.    Continue the olanzapine 15 mg at bedtime.    Continue Depakote 500 mg twice a day.    Continue the Keppra 500 mg 2 tablets twice a day.    Continue the Effexor 75 mg  daily.    Follow-up with Dr. Lynn in 6 to 8 weeks

## 2023-08-16 ENCOUNTER — TELEPHONE (OUTPATIENT)
Dept: PALLIATIVE MEDICINE | Facility: OTHER | Age: 83
End: 2023-08-16
Payer: MEDICARE

## 2023-08-16 ENCOUNTER — TELEPHONE (OUTPATIENT)
Dept: FAMILY MEDICINE | Facility: CLINIC | Age: 83
End: 2023-08-16
Payer: MEDICARE

## 2023-08-16 DIAGNOSIS — M25.562 LEFT KNEE PAIN: Primary | ICD-10-CM

## 2023-08-16 DIAGNOSIS — M79.605 LEG PAIN, BILATERAL: ICD-10-CM

## 2023-08-16 DIAGNOSIS — G89.29 CHRONIC PAIN OF RIGHT KNEE: ICD-10-CM

## 2023-08-16 DIAGNOSIS — M25.561 RIGHT KNEE PAIN: ICD-10-CM

## 2023-08-16 DIAGNOSIS — G89.29 CHRONIC NECK PAIN: ICD-10-CM

## 2023-08-16 DIAGNOSIS — M25.561 CHRONIC PAIN OF RIGHT KNEE: ICD-10-CM

## 2023-08-16 DIAGNOSIS — M79.604 LEG PAIN, BILATERAL: ICD-10-CM

## 2023-08-16 DIAGNOSIS — G89.4 CHRONIC PAIN SYNDROME: Primary | ICD-10-CM

## 2023-08-16 DIAGNOSIS — M54.2 CHRONIC NECK PAIN: ICD-10-CM

## 2023-08-16 DIAGNOSIS — M25.551 HIP PAIN, RIGHT: ICD-10-CM

## 2023-08-16 NOTE — PROGRESS NOTES
[facility] - Office Visit    ASSESSMENT & PLAN     Sandy was seen today for pain and pain management.    Diagnoses and all orders for this visit:    Lumbar radiculopathy  -     Adult Acupuncture Referral; Future  -     PAIN INJECTION EVAL/TREAT/FOLLOW UP; Future    Insomnia, unspecified type  -     tiZANidine (ZANAFLEX) 4 MG tablet; One tab bedtime, and may repeat after 4 hours        Patient Instructions     [facility] Number:  [phone]  Call with any questions about your care and for scheduling assistance.   Calls are returned Monday through Friday between 8 AM and 4:30 PM. We usually get back to you within 2 business days depending on the issue/request.    If we are prescribing your medications:  For opioid medication refills, call the clinic or send a [email or web address] message 7 days in advance.  Please include:  Name of requested medication  Name of the pharmacy.  For non-opioid medications, call your pharmacy directly to request a refill. Please allow 3-4 days to be processed.   Per [place] State Law:  All controlled substance prescriptions must be filled within 30 days of being written.    For those controlled substances allowing refills, pickup must occur within 30 days of last fill.      We believe regular attendance is key to your success in our program!    Any time you are unable to keep your appointment we ask that you call us at least 24 hours in advance to cancel.This will allow us to offer the appointment time to another patient.   Multiple missed appointments may lead to dismissal from the clinic.       PLAN:    At check out may schedule with Dr. Gil for right bursitis injection.    Referral made for acupuncture at [facility].  You may also contact [facility] in [facility] for acupuncture and frequency specific microcurrent near you at [phone].    Discussed herbal anti-inflammatories such as high-dose tumeric to help with pain  and other resources offered.    You may sign a release of information for psychiatric care today, and discussed you have psychiatric evaluations coming up.    Tizanidine to help with sleep, 4 mg 1 tablet at bedtime and may repeat after 4 hours.    Continue the olanzapine 15 mg at bedtime.    Continue Depakote 500 mg twice a day.    Continue the Keppra 500 mg 2 tablets twice a day.    Continue the Effexor 75 mg daily.    Follow-up with Dr. Fitzaptrick in 6 to 8 weeks      -----  ARELIS FITZPATRICK MD  [facility]       SUBJECTIVE      Sandy Spencer is a 82 year old year old female who presents to clinic today for the following:     Follow-up for chronic pain including complex regional pain syndrome, migraine headaches.    Seen company with her daughter.    Reviews continue with right hip pain.  She has been hoping Dr. Gil would do an injection indicating the lateral aspect.  She describes he did more about the medial aspect and is not as helpful.    The injections for her knees have been helpful.  Her back pain seems to be better.  The right pain bothers her most difficult getting out of bed.    She is very distressed with sleep, wakes after a few hours of hard to get back to sleep again.    She does continue with the Zyprexa 15 mg at bedtime, Depakote 500 mg twice a day, Keppra as before and the Effexor.    Patient and daughter reviewed that she tried 1 dose of ketamine for she went in the hospital.  Was told to stop the ketamine and lidocaine nasal spray in the hospital.    She does have a psychiatric evaluation coming up through the [facility] in [facility] with a psychotherapist.    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    We have tried a variety of agents to help with sleep.  Belsomra was  not covered.  She had tried tizanidine in the past which she thought might have been helpful.    She would like to look into acupuncture for her pain.  We have discussed high-dose herbal anti-inflammatories and she has gotten some tumeric from [facility].    She did discontinue the dizziness tonight.    They reviewed discussions they have had about power of , possibly moving into a more structured assisted living.  Met with primary care provider reviewing that as well.      Current Outpatient Medications:     acetaminophen (TYLENOL) 500 MG tablet, Take 1,000 mg by mouth 3 times daily, Disp: , Rfl:     albuterol (PROAIR HFA/PROVENTIL HFA/VENTOLIN HFA) 108 (90 Base) MCG/ACT inhaler, Inhale 2 puffs into the lungs every 6 hours, Disp: 18 g, Rfl: 4    allopurinol (ZYLOPRIM) 100 MG tablet, Take 1 tablet (100 mg) by mouth 2 times daily, Disp: 60 tablet, Rfl: 3    apixaban ANTICOAGULANT (ELIQUIS) 5 MG tablet, Take 1 tablet (5 mg) by mouth 2 times daily, Disp: 180 tablet, Rfl: 3    azelastine (ASTELIN) 0.1 % nasal spray, 2 sprays each nostril 1-2x daily as needed for nasal congestion (use nightly for first 2 week), Disp: 30 mL, Rfl: 11    Cholecalciferol (VITAMIN D-3) 125 MCG (5000 UT) TABS, Take 5,000 Units by mouth daily, Disp: , Rfl:     divalproex sodium delayed-release (DEPAKOTE) 500 MG DR tablet, Take 1 tablet (500 mg) by mouth 2 times daily, Disp: 56 tablet, Rfl: 1    levETIRAcetam (KEPPRA) 250 MG tablet, Take 1 tablet (250 mg) by mouth 4 times daily, Disp: 112 tablet, Rfl: 1    levothyroxine (SYNTHROID/LEVOTHROID) 50 MCG tablet, Take 1 tablet (50 mcg) by mouth daily, Disp: 90 tablet, Rfl: 3    methocarbamol (ROBAXIN) 500 MG tablet, Take 1 tablet (500 mg) by mouth 2 times daily, Disp: 60 tablet, Rfl: 1    OLANZapine (ZYPREXA) 10 MG tablet, Take 10 mg by mouth daily, Disp: , Rfl:     rosuvastatin (CRESTOR) 10 MG tablet, Take 1 tablet (10 mg) by mouth daily, Disp: 30 tablet, Rfl: 5    tiZANidine (ZANAFLEX) 4 MG  tablet, One tab bedtime, and may repeat after 4 hours, Disp: 60 tablet, Rfl: 3    torsemide (DEMADEX) 20 MG tablet, Take 1 tablet (20 mg) by mouth daily, Disp: 30 tablet, Rfl: 5    valACYclovir (VALTREX) 500 MG tablet, Take 1 tablet (500 mg) by mouth daily, Disp: 90 tablet, Rfl: 3    venlafaxine (EFFEXOR XR) 75 MG 24 hr capsule, Take 1 capsule (75 mg) by mouth daily, Disp: 28 capsule, Rfl: 1    ipratropium - albuterol 0.5 mg/2.5 mg/3 mL (DUONEB) 0.5-2.5 (3) MG/3ML neb solution, Take 1 vial by nebulization every 6 hours as needed for shortness of breath, wheezing or cough (Patient not taking: Reported on 8/7/2023), Disp: , Rfl:     Lidocaine (LIDOCARE) 4 % Patch, Place 2 patches onto the skin every 24 hours To prevent lidocaine toxicity, patient should be patch free for 12 hrs daily. (Patient not taking: Reported on 8/7/2023), Disp: 60 patch, Rfl: 5    OLANZapine (ZYPREXA) 15 MG tablet, Take 1 tablet (15 mg) by mouth At Bedtime (Patient not taking: Reported on 8/7/2023), Disp: 28 tablet, Rfl: 3    Current Facility-Administered Medications:     denosumab (PROLIA) injection 60 mg, 60 mg, Subcutaneous, Q6 Months, Caroline Sumner, NP, 60 mg at 08/03/23 1238      Review of Systems   General, psych, musculoskeletal, bowels and bladder otherwise normal other than above.          OBJECTIVE   BP (!) 145/77   Pulse 86   LMP  (LMP Unknown)   SpO2 96%        Physical Exam  General: Alert, clear sensorium.  No respiratory distress, no pain behavior.  Cardiovascular: Normal rate  Lungs: Pulmonary effort is normal, speaking in full sentences  MSK  Skin: . No concerning rashes or lesions.  Neurologic: alert and oriented x3  Psychiatric: Speech is normal rate.  Affect congruent, not hypomanic.    Assessment, patient's pain conditions including complex regional pain syndrome and headaches at present not the focus of treatment.    Did review with daughter using lidocaine to help with headaches, ketamine to help with patient wanted to  discontinue the fentanyl feeling concerned in medical settings treated like a drug addict.    Patient to establish with primary psychiatric management.    I did discuss with Dr. Gil who initially did a joint injection without approach.  Given that she may be indicating more concerned with bursitis he will do a more lateral approach.    Total time 25 minutes      Total time spent:  minutes

## 2023-08-16 NOTE — TELEPHONE ENCOUNTER
Screening Questions for Radiology Injections:    Injection to be done at which interventional clinic site? [facility]    Procedure ordered by Shane    Procedure ordered?   Right greater trochanter injection with fluroscopy, Dr. Gil       Transforaminal Cervical ANTONY - [facility] [place] [facility] - No  Community Site providers perform this procedure    What insurance would patient like us to bill for this procedure? Medicare/aarp  IF SCHEDULING IN [facility] OR [facility] PLEASE SCHEDULE AT LEAST 7-10 BUSINESS DAYS OUT SO A PA CAN BE OBTAINED  Worker's comp or MVA (motor vehicle accident) -Any injection DO NOT SCHEDULE and route to Wicho Damon.    [employer] insurance - For SI joint injections, DO NOT SCHEDULE and route to Alejandra Carsno.     ALL BCBS, [employer] and [employer] CIGNA - DO NOT SCHEDULE and route to Alejandra Alli  MEDICA- facet joint injections, route to Alejandra Alli    Is patient scheduled at [facility]? no   If YES, route every encounter to [facility] CARE NAVIGATION POOL [[phone]]    Is an  needed? No     Patient has a  home? (Review Grid) YES: ok    Any chance of pregnancy? NO   If YES, do NOT schedule and route to RN pool  - Dr. Pike route to Sammie Somers and PM&R Nurse  [[id number]]      Is patient actively being treated for cancer or immunocompromised? No  If YES, do NOT schedule and route to RN pool/ Dr. Pike's Team    Does the patient have a bleeding or clotting disorder? No   If YES, okay to schedule AND route to RN nurse / Dr. Pike's Team   (For any patients with platelet count <100, RN must forward to provider)    Is patient taking any Blood Thinners OR Antiplatelet medication?  Yes - Eliquis -NO HOLD  If hold needed, do NOT schedule, route to RN pool/ Dr. Pike's Team  Examples:   Blood Thinners: (Coumadin, Warfarin, Jantoven, Pradaxa, Xarelto, Eliquis, Edoxaban, Enoxaparin, Lovenox, Heparin, Arixtra, Fondaparinux or Fragmin)  Antiplatelet Medications:  (Plavix, Brilinta or Effient)     Is patient taking any aspirin products (includes Excedrin and Fiorinal)? No   If more than 325mg/day, OK to schedule; Instruct Pt to decrease to less than 325 mg for 7 days AND route to RN pool/ Dr. Pike's Team   For CERVICAL procedures, hold all aspirin products for 6 days.   Tell Pt that if aspirin product is not held for 6 days, the procedure WILL BE cancelled.     Any allergies to contrast dye, iodine, shellfish, or numbing and steroid medications? No  If YES, schedule and add allergy information to appointment notes AND route to the RN pool/ Dr. Pike's Team  If ANTONY and Contrast Dye / Iodine Allergy? DO NOT SCHEDULE, route to RN pool/ Dr. Pike's Team  Allergies: Acamprosate, Amoxicillin-pot clavulanate, Carbamazepine, Cyclobenzaprine, Mirtazapine, Prednisone, Pregabalin, Sulfa antibiotics, Sulfamethoxazole-trimethoprim, Zolpidem, Acetaminophen, Amiloride, Amoxicillin, Aspirin, Bupropion, Chromium, Duloxetine hcl, Nsaids, Other drug allergy (see comments), Oxycodone, Serotonin, Sulindac, Tolmetin, Verapamil, and Valproic acid     Does patient have an active infection or treated for one within the past week? No  Is patient currently taking any antibiotics or steroid medications?  No   For patients on chronic, preventative, or prophylactic antibiotics, procedures may be scheduled.   For patients on antibiotics for active or recent infection, schedule 4 days after completed.  For patients on steroid medications, schedule 4 days after completed.     Has the patient had a flu shot or any other vaccinations within the past 7 days? No  If yes, explain that for the vaccine to work best they need to:     wait 1 week before and 1 week after getting any Vaccine  wait 1 week before and 2 weeks after getting Covid Vaccine #2 or BOOSTER  If patient has concerns about the timing, send to RN pool/ Dr. Pike's Team    Does patient have an MRI/CT?  Not Applicable Include Date and Check  Procedure Scheduling Grid to see if required.  Was the MRI/CT done within the last 3 years?  NA   If no route to  [place]/ Dr. Zayass Team  If yes, where was the MRI/CT done?    Refer to PACS Transmissions list for approved external locations and route to RN Pool High Priority/ Dr. Zayass Team  If MRI was not done at approved external location do NOT schedule and route to RN pool/ Dr. Zayass Team    If patient has an imaging disc, the injection MAY be scheduled but patient must bring disc to appt or appt will be cancelled.    Is patient able to transfer to a procedure table with minimal or no assistance? Yes   If no, do NOT schedule and route to  Pool/ Dr. Pike's Team    Procedure Specific Instructions:  If celiac plexus block, informed patient NPO for 6 hours and that it is okay to take medications with sips of water, especially blood pressure medications Not Applicable       If this is for a cervical procedure, informed patient that aspirin needs to be held for 6 days.   Not Applicable    Sedation, If Sedation is ordered for any procedure, patient must be NPO for 6 hours prior to procedure Not Applicable    If IV needed:  Do not schedule procedures requiring IV placement in the first appointment of the day or first appointment after lunch. Do NOT schedule at 0745, 0815 or 1245. ok  Instructed patient to arrive 30 minutes early for IV start if required. (Check Procedure Scheduling Grid)  Not Applicable    Reminders:  If you are started on any steroids or antibiotics between now and your appointment, you must contact us because the procedure may need to be cancelled.  Yes    As a reminder, receiving steroids can decrease your body's ability to fight infection.   Would you still like to move forward with scheduling the injection?  Yes    IV Sedation is not provided for procedures. If oral anti-anxiety medication is needed, the patient should request this from their referring provider.    Instruct patient to  arrive as directed prior to the scheduled appointment time:  If IV needed 30 minutes before appointment time     For patients 85 or older we recommend having an adult stay w/ them for the remainder of the day.     If the patient is Diabetic, remind them to bring their glucometer.      Does the patient have any questions?  NO  Sharri Damon  [facility]

## 2023-08-16 NOTE — TELEPHONE ENCOUNTER
Reason for call:  Other     Patient called regarding (reason for call): order     Additional comments: Olivia is calling and stating that the patient was told that her provider was going to put an order in for pt. Writer did not see any orders for this in patient's chart. Please fax order to [facility] at [phone]. Please advise and call Olivia back if needed please and thank you.    Phone number to reach patient:  Other phone number:  [phone]

## 2023-08-18 ENCOUNTER — TRANSFERRED RECORDS (OUTPATIENT)
Dept: HEALTH INFORMATION MANAGEMENT | Facility: CLINIC | Age: 83
End: 2023-08-18
Payer: MEDICARE

## 2023-08-18 ENCOUNTER — PATIENT OUTREACH (OUTPATIENT)
Dept: CARE COORDINATION | Facility: CLINIC | Age: 83
End: 2023-08-18
Payer: MEDICARE

## 2023-08-18 NOTE — PROGRESS NOTES
Contact  [facility]/Voicemail    Referral Source: Care Team  Clinical Data:  Outreach  Outreach attempted x 1.  Left message on voicemail with call back information and requested return call.  Plan:  will follow up with daughter in three weeks.   Rochelle Weber,   [facility]  [phone]

## 2023-08-18 NOTE — TELEPHONE ENCOUNTER
[facility] calling for orders, not received.  Would like referral sent to them per pt request, is clear that this is not an insurance referral.

## 2023-08-18 NOTE — TELEPHONE ENCOUNTER
"After searching for this form I found it in the \"already faxed folder\" so I'm closing this task but I am unsure who completed this task.     Charu Parra, [facility]    "

## 2023-08-25 ENCOUNTER — PATIENT OUTREACH (OUTPATIENT)
Dept: CARE COORDINATION | Facility: CLINIC | Age: 83
End: 2023-08-25
Payer: MEDICARE

## 2023-08-25 NOTE — Clinical Note
Patient has gained weight as she is eating more.  She also be having fluid retention.  She has appt with you on 8-31 and it can wait until then.  Thanks, Rochelle

## 2023-08-25 NOTE — PROGRESS NOTES
Clinic Care Coordination Contact  Follow Up Progress Note      Assessment: Call from Melania.  She continues to see mom often and help with a variety of tasks, including monitoring her meds.  They may get a medication dispenser if she has trouble taking her meds as prescribed.      She did see therapist at [facility], Annemarie Herrera. She had hoped to have [facility] also provide psychiatry, but they couldn't schedule anything before her upcoming appt at [facility]'s the end of September, so they will keep the [facility]'s appt.  Patient wants to change her power of  for health care to name Melania and her sister.  Melania is seeing her about three times a week and is gradually seeing improvement in her mental health.  At this time, she does not want to move to assisted living.   She continues to get meals on wheels.  Unsure that she is actually on waiver. Melania doesn't want to get waiver services as they would place a lien on her house.  Melania thinks she can provide the assistance she needs until she has to move.      Patient is eating a lot and has gained weight.  Melania is not concerned about that and it may be a side effect to her medications.  Patient doesn't seem concerned about the weight gain.  Melania wonders if she is having some water retention. Has upcoming appt with PCP and will discuss.  She will also bring the papers for [employer] for PCP to complete.    Melania will have more time soon as her son will be going to [employer]. Melania is going to look for more social activities for her as she enjoys people.       Care Gaps:    Health Maintenance Due   Topic Date Due    COPD ACTION PLAN  Never done    COVID-19 Vaccine (1) Never done    MEDICARE ANNUAL WELLNESS VISIT  Never done    INFLUENZA VACCINE (1) 09/01/2023    BMP  09/14/2023    MICROALBUMIN  09/14/2023       Currently there are no Care Gaps.    Care Plans  Care Plan: Transportation       Problem: Lack of transportation       Goal: Establish reliable  transportation through [employer]       Start Date: 8/1/2023 Expected End Date: 5/1/2024    This Visit's Progress: 50% Recent Progress: 10%    Priority: Medium    Note:     Barriers: none noted.   Strengths: daughter can assist with application.   Patient expressed understanding of goal: daughter does  Action steps to achieve this goal:  1. Daughter will print the application and will complete patient part.   2. Daughter will work with PCP or pain clinic to ask to have the professional portion completed.   3. I will report progress towards this goal at scheduled outreach telephone calls from the [facility] team.                                Care Plan: Medication Regimen       Problem: Medication Adherence       Long-Range Goal: I will take my medications as prescribed.       Start Date: 8/1/2023 Expected End Date: 7/31/2024    This Visit's Progress: 30% Recent Progress: 10%    Priority: High    Note:     Barriers: doesn't always take her medications as prescribed.   Strengths: daughter can assist.   Patient expressed understanding of goal: daughter does  Action steps to achieve this goal:  1. Daughter will work with [facility] to understand current medication regime.  2. Daughter will work with PCP and pain clinic to see if a psychiatrist is needed.    3. Daughter will report progress towards this goal at scheduled outreach telephone calls from the [facility] team.                               Care Plan: Help At Home       Problem: Insufficient In-home support       Long-Range Goal: I have enough support at home to live independently.       Start Date: 8/1/2023 Expected End Date: 7/31/2024    This Visit's Progress: 30% Recent Progress: 10%    Priority: High    Note:     Barriers: patient may not accept.   Strengths: daughter is supportive  Patient expressed understanding of goal: daughter does  Action steps to achieve this goal:  1. Daughter will monitor patient's needs.  2. Daughter will work with care team to get needed  services.   3. Daughter will report progress towards this goal at scheduled outreach telephone calls from the [facility] team.  8-9 has a waiver, gets meals                                Intervention/Education provided during outreach: support.      Outreach Frequency: monthly    Plan:     Care Coordinator will follow up in 30 days and as needed.   Rochelle Weber,   [facility]  [phone]

## 2023-08-29 NOTE — CONFIDENTIAL NOTE
Procedure order placed:  Right greater trochanteric injection    Red Flags:  Patient is taking Eloquis-no hold required  7/30/23: CBC labs elevated  7/29/23-elevated kidney functions labs  Last knee joint injection: 7/5/23  Last hip joint injections: 6/21/23

## 2023-08-30 ENCOUNTER — OFFICE VISIT (OUTPATIENT)
Dept: FAMILY MEDICINE | Facility: CLINIC | Age: 83
End: 2023-08-30
Payer: MEDICARE

## 2023-08-30 VITALS
HEIGHT: 62 IN | TEMPERATURE: 98.1 F | RESPIRATION RATE: 15 BRPM | DIASTOLIC BLOOD PRESSURE: 76 MMHG | OXYGEN SATURATION: 98 % | SYSTOLIC BLOOD PRESSURE: 148 MMHG | BODY MASS INDEX: 29.81 KG/M2 | HEART RATE: 82 BPM | WEIGHT: 162 LBS

## 2023-08-30 DIAGNOSIS — N18.4 ANEMIA IN STAGE 4 CHRONIC KIDNEY DISEASE (H): ICD-10-CM

## 2023-08-30 DIAGNOSIS — I15.1 HYPERTENSION SECONDARY TO OTHER RENAL DISORDERS: ICD-10-CM

## 2023-08-30 DIAGNOSIS — M54.41 CHRONIC BILATERAL LOW BACK PAIN WITH BILATERAL SCIATICA: ICD-10-CM

## 2023-08-30 DIAGNOSIS — G89.4 CHRONIC PAIN SYNDROME: ICD-10-CM

## 2023-08-30 DIAGNOSIS — I25.119 CORONARY ARTERY DISEASE INVOLVING NATIVE CORONARY ARTERY OF NATIVE HEART WITH ANGINA PECTORIS (H): ICD-10-CM

## 2023-08-30 DIAGNOSIS — G89.29 CHRONIC BILATERAL LOW BACK PAIN WITH BILATERAL SCIATICA: ICD-10-CM

## 2023-08-30 DIAGNOSIS — G89.29 CHRONIC NECK PAIN: ICD-10-CM

## 2023-08-30 DIAGNOSIS — M10.9 GOUT, UNSPECIFIED CAUSE, UNSPECIFIED CHRONICITY, UNSPECIFIED SITE: ICD-10-CM

## 2023-08-30 DIAGNOSIS — M54.2 CHRONIC NECK PAIN: ICD-10-CM

## 2023-08-30 DIAGNOSIS — M54.42 CHRONIC BILATERAL LOW BACK PAIN WITH BILATERAL SCIATICA: ICD-10-CM

## 2023-08-30 DIAGNOSIS — D63.1 ANEMIA IN STAGE 4 CHRONIC KIDNEY DISEASE (H): ICD-10-CM

## 2023-08-30 DIAGNOSIS — N18.4 CKD (CHRONIC KIDNEY DISEASE) STAGE 4, GFR 15-29 ML/MIN (H): ICD-10-CM

## 2023-08-30 DIAGNOSIS — G47.00 INSOMNIA, UNSPECIFIED TYPE: Primary | ICD-10-CM

## 2023-08-30 LAB
ALBUMIN SERPL BCG-MCNC: 4.2 G/DL (ref 3.5–5.2)
ALP SERPL-CCNC: 51 U/L (ref 35–104)
ALT SERPL W P-5'-P-CCNC: 11 U/L (ref 0–50)
ANION GAP SERPL CALCULATED.3IONS-SCNC: 13 MMOL/L (ref 7–15)
AST SERPL W P-5'-P-CCNC: 35 U/L (ref 0–45)
BILIRUB SERPL-MCNC: 0.3 MG/DL
BUN SERPL-MCNC: 54.4 MG/DL (ref 8–23)
CALCIUM SERPL-MCNC: 8.8 MG/DL (ref 8.8–10.2)
CHLORIDE SERPL-SCNC: 100 MMOL/L (ref 98–107)
CHOLEST SERPL-MCNC: 257 MG/DL
CREAT SERPL-MCNC: 1.87 MG/DL (ref 0.51–0.95)
CREAT UR-MCNC: 22.6 MG/DL
DEPRECATED HCO3 PLAS-SCNC: 28 MMOL/L (ref 22–29)
ERYTHROCYTE [DISTWIDTH] IN BLOOD BY AUTOMATED COUNT: 16.3 % (ref 10–15)
GFR SERPL CREATININE-BSD FRML MDRD: 27 ML/MIN/1.73M2
GLUCOSE SERPL-MCNC: 73 MG/DL (ref 70–99)
HCT VFR BLD AUTO: 36.1 % (ref 35–47)
HDLC SERPL-MCNC: 114 MG/DL
HGB BLD-MCNC: 11.5 G/DL (ref 11.7–15.7)
LDLC SERPL CALC-MCNC: 126 MG/DL
MCH RBC QN AUTO: 33.4 PG (ref 26.5–33)
MCHC RBC AUTO-ENTMCNC: 31.9 G/DL (ref 31.5–36.5)
MCV RBC AUTO: 105 FL (ref 78–100)
MICROALBUMIN UR-MCNC: 13.4 MG/L
MICROALBUMIN/CREAT UR: 59.29 MG/G CR (ref 0–25)
NONHDLC SERPL-MCNC: 143 MG/DL
PLATELET # BLD AUTO: 182 10E3/UL (ref 150–450)
POTASSIUM SERPL-SCNC: 3.9 MMOL/L (ref 3.4–5.3)
PROT SERPL-MCNC: 6.8 G/DL (ref 6.4–8.3)
RBC # BLD AUTO: 3.44 10E6/UL (ref 3.8–5.2)
SODIUM SERPL-SCNC: 141 MMOL/L (ref 136–145)
TRIGL SERPL-MCNC: 84 MG/DL
URATE SERPL-MCNC: 5.4 MG/DL (ref 2.4–5.7)
WBC # BLD AUTO: 7.5 10E3/UL (ref 4–11)

## 2023-08-30 PROCEDURE — 84550 ASSAY OF BLOOD/URIC ACID: CPT | Performed by: FAMILY MEDICINE

## 2023-08-30 PROCEDURE — 82570 ASSAY OF URINE CREATININE: CPT | Performed by: FAMILY MEDICINE

## 2023-08-30 PROCEDURE — 80053 COMPREHEN METABOLIC PANEL: CPT | Performed by: FAMILY MEDICINE

## 2023-08-30 PROCEDURE — 85027 COMPLETE CBC AUTOMATED: CPT | Performed by: FAMILY MEDICINE

## 2023-08-30 PROCEDURE — 99215 OFFICE O/P EST HI 40 MIN: CPT | Performed by: FAMILY MEDICINE

## 2023-08-30 PROCEDURE — 36415 COLL VENOUS BLD VENIPUNCTURE: CPT | Performed by: FAMILY MEDICINE

## 2023-08-30 PROCEDURE — 82043 UR ALBUMIN QUANTITATIVE: CPT | Performed by: FAMILY MEDICINE

## 2023-08-30 PROCEDURE — 80061 LIPID PANEL: CPT | Performed by: FAMILY MEDICINE

## 2023-08-30 RX ORDER — DOXEPIN 3 MG/1
3 TABLET, FILM COATED ORAL
Qty: 10 TABLET | Refills: 1 | Status: SHIPPED | OUTPATIENT
Start: 2023-08-30 | End: 2024-02-05

## 2023-08-30 RX ORDER — CARVEDILOL 3.12 MG/1
3.12 TABLET ORAL 2 TIMES DAILY
Qty: 180 TABLET | Refills: 3 | Status: SHIPPED | OUTPATIENT
Start: 2023-08-30

## 2023-08-30 RX ORDER — VIT C/B6/B5/MAGNESIUM/HERB 173 50-5-6-5MG
1000 CAPSULE ORAL
COMMUNITY
End: 2024-02-05

## 2023-08-30 ASSESSMENT — ANXIETY QUESTIONNAIRES
3. WORRYING TOO MUCH ABOUT DIFFERENT THINGS: SEVERAL DAYS
GAD7 TOTAL SCORE: 6
7. FEELING AFRAID AS IF SOMETHING AWFUL MIGHT HAPPEN: NOT AT ALL
IF YOU CHECKED OFF ANY PROBLEMS ON THIS QUESTIONNAIRE, HOW DIFFICULT HAVE THESE PROBLEMS MADE IT FOR YOU TO DO YOUR WORK, TAKE CARE OF THINGS AT HOME, OR GET ALONG WITH OTHER PEOPLE: SOMEWHAT DIFFICULT
1. FEELING NERVOUS, ANXIOUS, OR ON EDGE: NOT AT ALL
6. BECOMING EASILY ANNOYED OR IRRITABLE: SEVERAL DAYS
2. NOT BEING ABLE TO STOP OR CONTROL WORRYING: MORE THAN HALF THE DAYS
GAD7 TOTAL SCORE: 6
5. BEING SO RESTLESS THAT IT IS HARD TO SIT STILL: SEVERAL DAYS
4. TROUBLE RELAXING: SEVERAL DAYS

## 2023-08-30 ASSESSMENT — PATIENT HEALTH QUESTIONNAIRE - PHQ9
SUM OF ALL RESPONSES TO PHQ QUESTIONS 1-9: 10
SUM OF ALL RESPONSES TO PHQ QUESTIONS 1-9: 10
10. IF YOU CHECKED OFF ANY PROBLEMS, HOW DIFFICULT HAVE THESE PROBLEMS MADE IT FOR YOU TO DO YOUR WORK, TAKE CARE OF THINGS AT HOME, OR GET ALONG WITH OTHER PEOPLE: EXTREMELY DIFFICULT

## 2023-08-30 NOTE — PROGRESS NOTES
Assessment & Plan     Insomnia, unspecified type  -Has tried doxepin in the past, unsure results. Discussed, given her age, options are limited. Will follow up here in a few months to see how she is doing, if she finds this is working we can increase the dose some as well. I suspect as her mental health improves her sleeping will improve as well.   - doxepin (SILENOR) 3 MG tablet  Dispense: 10 tablet; Refill: 1    Hypertension secondary to other renal disorders  -BP elevated today, will have her resume her Coreg as listed, if she finds that her BP is still elevated and her pulse is over 70 we can dose increase from here. I'm unsure why she is not on an ACE/ARB, seeing nephrology, but does have some proteinuria, so likely should be on one of these as well  - carvedilol (COREG) 3.125 MG tablet  Dispense: 180 tablet; Refill: 3  - Comprehensive metabolic panel (BMP + Alb, Alk Phos, ALT, AST, Total. Bili, TP)  - Comprehensive metabolic panel (BMP + Alb, Alk Phos, ALT, AST, Total. Bili, TP)    Gout, unspecified cause, unspecified chronicity, unspecified site  -Will update levels given kidney function, adjust as needed.   - Uric acid  - Uric acid    CKD (chronic kidney disease) stage 4, GFR 15-29 ml/min (H)  -Will update labs and treat/adjust accordingly. Seeing nephrology.   - BASIC METABOLIC PANEL  - Albumin Random Urine Quantitative with Creat Ratio  - Albumin Random Urine Quantitative with Creat Ratio    Anemia in stage 4 chronic kidney disease (H)  - CBC with platelets    Coronary artery disease involving native coronary artery of native heart with angina pectoris (H)  -Has had her statin decreased per pharmacy for her kidney function, is elevated above goal LDL of 70. Will need to consider either referral back to cardiology for repatha or else we can consider adding in Zetia  - Lipid panel reflex to direct LDL Non-fasting  - Lipid panel reflex to direct LDL Non-fasting    Chronic neck pain  -Referral to spine for  further evaluation  - Spine  Referral    Chronic pain syndrome  - Spine  Referral    Chronic bilateral low back pain with bilateral sciatica  - Spine  Referral        40 minutes spent by me on the date of the encounter doing chart review, history and exam, documentation and further activities per the note           Caitlyn Rees MD  [facility] KWABENA Delgado is a 82 year old, presenting for the following health issues:  RECHECK      History of Present Illness       Back Pain:  She presents for follow up of back pain. Patient's back pain is a chronic problem.  Location of back pain:  Right lower back, left lower back, right middle of back, left middle of back, right upper back, left upper back, right side of neck, left side of neck, right shoulder, left shoulder, right buttock, left buttock, right hip and left hip  Description of back pain: burning, cramping, dull ache, fullness, gnawing, sharp, shooting and stabbing  Back pain spreads: right buttocks, right thigh, right knee, right shoulder, left shoulder and right side of neck    Since patient first noticed back pain, pain is: always present, but gets better and worse  Does back pain interfere with her job:  Not applicable       CKD: She is not using over the counter pain medicine.     Mental Health Follow-up:  Patient presents to follow-up on Depression & Anxiety.Patient's depression since last visit has been:  Better  The patient is not having other symptoms associated with depression.  Patient's anxiety since last visit has been:  Better  The patient is not having other symptoms associated with anxiety.  Any significant life events: No  Patient is not feeling anxious or having panic attacks.  Patient has no concerns about alcohol or drug use.    Hyperlipidemia:  She presents for follow up of hyperlipidemia.   She is taking medication to lower cholesterol. She is having myalgia or other side  "effects to statin medications.    Hypertension: She presents for follow up of hypertension.  She does check blood pressure  regularly outside of the clinic. Outside blood pressures have been over 140/90. She does not follow a low salt diet.     Headaches:   Since the patient's last clinic visit, headaches are: no change  The patient is getting headaches:  4xmonth  She is not able to do normal daily activities when she has a migraine.  The patient is taking the following rescue/relief medications:  No rescue/relief medications and Tylenol   Patient states \"I get only a small amount of relief\" from the rescue/relief medications.   The patient is taking the following medications to prevent migraines:  Depakote  In the past 4 weeks, the patient has gone to an Urgent Care or Emergency Room 0 times times due to headaches.    Reason for visit:  Followup after hospital visit    She eats 2-3 servings of fruits and vegetables daily.She consumes 0 sweetened beverage(s) daily.She exercises with enough effort to increase her heart rate 9 or less minutes per day.  She exercises with enough effort to increase her heart rate 3 or less days per week.   She is taking medications regularly.     She is heretoday for follow up.     Se does note that her BP is up. It's been higher than it has been. She's getting 132-145/80s. She is taking the torsemide. She has been off of the coreg for a while. She is wondering if she should be on this, she does wonder if she should be on this.     She is hurting all the time. She does note some weight gain with the new medication. She does note that she has had sme nerve impingment that her massesuse notes. She does have a hard time getting in and out of the car. She does go to [employer].     She was able to start with [facility] and is doing taping on her knee and this is helping her.     They do want to talk about the gout medication. She is due for a uric acid.     She does have some issues with sleeping.She " does note that she has tried melatonin. She is taking the tizanidine. She is not dreaming.       Review of Systems   Constitutional, HEENT, cardiovascular, pulmonary, gi and gu systems are negative, except as otherwise noted.      Objective    LMP  (LMP Unknown)   There is no height or weight on file to calculate BMI.  Physical Exam   GENERAL: healthy, alert and no distress  NECK: no adenopathy, no asymmetry, masses, or scars and thyroid normal to palpation  RESP: lungs clear to auscultation - no rales, rhonchi or wheezes  CV: regular rate and rhythm, normal S1 S2, no S3 or S4, no murmur, click or rub, no peripheral edema and peripheral pulses strong  MS: no gross musculoskeletal defects noted, no edema  PSYCH: tangential, affect normal/bright, appearance well groomed, and improved attention during conversation at this time. She does still seem somewhat tangential at times though.                   3

## 2023-08-30 NOTE — LETTER
August 31, 2023      Sandy Spencer  [address] [place] [address]        Dear MsJorySpencer,    We are writing to inform you of your test results.    Your urine testing shows a little protein in the urine which is common with kidneys that are functioning less well than normal. We'll continue to work on good blood pressure control with this.     Your electrolytes are normal as are your liver tests and your GFR is stable.     Your cholesterol is better, although still higher than the ideal goal. I don't think switching your medication is going to help, but we could consider adding in a medication called Zetia to help with this OR you could discuss again with cardiology the affordability of the injection to help with your cholesterol.     Your hemoglobin is improving. The average size of your blood cell is a tad large, which can be a sign of B12 deficiency. I'd recommend that you consider adding some B12 into your diet and we'll recheck this again next time maybe you are in.     Your gout number is great as well. I'm not sure how we ended up on two times daily dosing, this is not usually how I prescribe this. For sake of ease, and with your number you can either take two pills at the same time, or we can try cutting down to one pill a day and see how this does for you as well. If you have a gout flare, then we know that two times a day is the right dose for you.     Resulted Orders   CBC with platelets   Result Value Ref Range    WBC Count 7.5 4.0 - 11.0 10e3/uL    RBC Count 3.44 (L) 3.80 - 5.20 10e6/uL    Hemoglobin 11.5 (L) 11.7 - 15.7 g/dL    Hematocrit 36.1 35.0 - 47.0 %     (H) 78 - 100 fL    MCH 33.4 (H) 26.5 - 33.0 pg    MCHC 31.9 31.5 - 36.5 g/dL    RDW 16.3 (H) 10.0 - 15.0 %    Platelet Count 182 150 - 450 10e3/uL   Albumin Random Urine Quantitative with Creat Ratio   Result Value Ref Range    Creatinine Urine mg/dL 22.6 mg/dL      Comment:      The reference ranges have not been established in  urine creatinine. The results should be integrated into the clinical context for interpretation.    Albumin Urine mg/L 13.4 mg/L      Comment:      The reference ranges have not been established in urine albumin. The results should be integrated into the clinical context for interpretation.    Albumin Urine mg/g Cr 59.29 (H) 0.00 - 25.00 mg/g Cr      Comment:      Microalbuminuria is defined as an albumin:creatinine ratio of 17 to 299 for males and 25 to 299 for females. A ratio of albumin:creatinine of 300 or higher is indicative of overt proteinuria.  Due to biologic variability, positive results should be confirmed by a second, first-morning random or 24-hour timed urine specimen. If there is discrepancy, a third specimen is recommended. When 2 out of 3 results are in the microalbuminuria range, this is evidence for incipient nephropathy and warrants increased efforts at glucose control, blood pressure control, and institution of therapy with an angiotensin-converting-enzyme (ACE) inhibitor (if the patient can tolerate it).     Uric acid   Result Value Ref Range    Uric Acid 5.4 2.4 - 5.7 mg/dL   Comprehensive metabolic panel (BMP + Alb, Alk Phos, ALT, AST, Total. Bili, TP)   Result Value Ref Range    Sodium 141 136 - 145 mmol/L    Potassium 3.9 3.4 - 5.3 mmol/L    Chloride 100 98 - 107 mmol/L    Carbon Dioxide (CO2) 28 22 - 29 mmol/L    Anion Gap 13 7 - 15 mmol/L    Urea Nitrogen 54.4 (H) 8.0 - 23.0 mg/dL    Creatinine 1.87 (H) 0.51 - 0.95 mg/dL    Calcium 8.8 8.8 - 10.2 mg/dL    Glucose 73 70 - 99 mg/dL    Alkaline Phosphatase 51 35 - 104 U/L    AST 35 0 - 45 U/L      Comment:      Reference intervals for this test were updated on 6/12/2023 to more accurately reflect our healthy population. There may be differences in the flagging of prior results with similar values performed with this method. Interpretation of those prior results can be made in the context of the updated reference intervals.    ALT 11 0 - 50  U/L      Comment:      Reference intervals for this test were updated on 6/12/2023 to more accurately reflect our healthy population. There may be differences in the flagging of prior results with similar values performed with this method. Interpretation of those prior results can be made in the context of the updated reference intervals.      Protein Total 6.8 6.4 - 8.3 g/dL    Albumin 4.2 3.5 - 5.2 g/dL    Bilirubin Total 0.3 <=1.2 mg/dL    GFR Estimate 27 (L) >60 mL/min/1.73m2   Lipid panel reflex to direct LDL Non-fasting   Result Value Ref Range    Cholesterol 257 (H) <200 mg/dL    Triglycerides 84 <150 mg/dL    Direct Measure  >=50 mg/dL    LDL Cholesterol Calculated 126 (H) <=100 mg/dL    Non HDL Cholesterol 143 (H) <130 mg/dL    Narrative    Cholesterol  Desirable:  <200 mg/dL    Triglycerides  Normal:  Less than 150 mg/dL  Borderline High:  150-199 mg/dL  High:  200-499 mg/dL  Very High:  Greater than or equal to 500 mg/dL    Direct Measure HDL  Female:  Greater than or equal to 50 mg/dL   Male:  Greater than or equal to 40 mg/dL    LDL Cholesterol  Desirable:  <100mg/dL  Above Desirable:  100-129 mg/dL   Borderline High:  130-159 mg/dL   High:  160-189 mg/dL   Very High:  >= 190 mg/dL    Non HDL Cholesterol  Desirable:  130 mg/dL  Above Desirable:  130-159 mg/dL  Borderline High:  160-189 mg/dL  High:  190-219 mg/dL  Very High:  Greater than or equal to 220 mg/dL       If you have any questions or concerns, please call the clinic at the number listed above.       Sincerely,      Caitlyn Rees MD

## 2023-08-31 ENCOUNTER — TELEPHONE (OUTPATIENT)
Dept: PHARMACY | Facility: CLINIC | Age: 83
End: 2023-08-31
Payer: MEDICARE

## 2023-08-31 NOTE — TELEPHONE ENCOUNTER
Called patient to see if they would like to re-schedule MTM visit with either myself of John. Gave patient MTM number to re-schedule when they are able to do so.     Lucille Butler, PharmD  Medication Therapy Management Pharmacist   [facility]

## 2023-09-01 ENCOUNTER — TELEPHONE (OUTPATIENT)
Dept: PALLIATIVE MEDICINE | Facility: OTHER | Age: 83
End: 2023-09-01
Payer: MEDICARE

## 2023-09-01 NOTE — TELEPHONE ENCOUNTER
[facility] Call Center    Phone Message    May a detailed message be left on voicemail: yes     Reason for Call: Sandy Spencer would like to cancel the following appointments:     Ronnie Gil MD in [facility] ([id number]), 9/6/2023  3:00 PM  [facility] ([phone]), 9/6/2023  3:00 PM     Comments:  Cancel appointment.    Action Taken: Other: Routed to Pain Injection Schedulers    Travel Screening: Not Applicable

## 2023-09-05 ENCOUNTER — TELEPHONE (OUTPATIENT)
Dept: FAMILY MEDICINE | Facility: CLINIC | Age: 83
End: 2023-09-05

## 2023-09-05 ENCOUNTER — TELEPHONE (OUTPATIENT)
Dept: FAMILY MEDICINE | Facility: CLINIC | Age: 83
End: 2023-09-05
Payer: MEDICARE

## 2023-09-05 NOTE — TELEPHONE ENCOUNTER
Called and spoke with Viola, patient's daughter - she will  forms later this week. I did make her aware that there was one spot where her mother still needs to sign.     Charu Parra, CMA

## 2023-09-05 NOTE — TELEPHONE ENCOUNTER
Test Results        Who ordered the test:  henrique    Type of test: Lab    Date of test:  8/30/23    Where was the test performed:  [facility]    What are your questions/concerns?:  Patient would like to discuss lab results, specifically regarding her cholesterol. Please call patient to discuss results from 8/30/23    Could we send this information to you in [employer] or would you prefer to receive a phone call?:   Patient would prefer a phone call   Okay to leave a detailed message?: Yes at Home number on file [phone] (home)

## 2023-09-05 NOTE — TELEPHONE ENCOUNTER
Patient Returning Call    Reason for call:  returning call    Information relayed to patient:  yes, relayed message from Dr. Rees.   Patient would like to have form mailed to her, and then patient will sign in and mail it herself.    Patient has additional questions:  No

## 2023-09-05 NOTE — TELEPHONE ENCOUNTER
Please call patient/daughter. I have her [employer] forms filled out.     There was one signature from [place] that was missed though if we can have them come pick it up and sign and we can mail (our mail is slow) or they can take it and mail themselves.

## 2023-09-12 ENCOUNTER — TRANSFERRED RECORDS (OUTPATIENT)
Dept: HEALTH INFORMATION MANAGEMENT | Facility: CLINIC | Age: 83
End: 2023-09-12
Payer: MEDICARE

## 2023-09-12 ENCOUNTER — TELEPHONE (OUTPATIENT)
Dept: FAMILY MEDICINE | Facility: CLINIC | Age: 83
End: 2023-09-12
Payer: MEDICARE

## 2023-09-12 ENCOUNTER — TELEPHONE (OUTPATIENT)
Dept: FAMILY MEDICINE | Facility: CLINIC | Age: 83
End: 2023-09-12

## 2023-09-27 ENCOUNTER — PATIENT OUTREACH (OUTPATIENT)
Dept: CARE COORDINATION | Facility: CLINIC | Age: 83
End: 2023-09-27
Payer: MEDICARE

## 2023-09-27 NOTE — PROGRESS NOTES
Clinic Care Coordination Contact  Follow Up Progress Note      Assessment: VM from Melania asking for call back. Melania is concerned that after patient did well for 6 weeks, she is declining significantly with her mental health. She sets up a week of medications at a time, and thinks she is mostly taking as set up. There has been a few times where she took two doses at night.  She is more fixated on her body. She has gained weight as she is eating much more.  She is somewhat aware that she is having more disregulation.  She describes herself as being shaky inside. She has appt with psychiatrist tomorrow at [facility] with Dr. Harrington. Daughter will be with patient at this appt.  A family member suggested that patient may benefit from injectable medications. She is not doing her housework as well.  Discussed possible move to assisted living.     Care Gaps:    Health Maintenance Due   Topic Date Due    COPD ACTION PLAN  Never done    COVID-19 Vaccine (1) Never done    MEDICARE ANNUAL WELLNESS VISIT  Never done    INFLUENZA VACCINE (1) 09/01/2023       Postponed to next appointment with pcp     Care Plans  Care Plan: Transportation       Problem: Lack of transportation       Goal: Establish reliable transportation through [employer]       Start Date: 8/1/2023 Expected End Date: 5/1/2024    This Visit's Progress: 50% Recent Progress: 50%    Priority: Medium    Note:     Barriers: none noted.   Strengths: daughter can assist with application.   Patient expressed understanding of goal: daughter does  Action steps to achieve this goal:  1. Daughter will print the application and will complete patient part.   2. Daughter will work with PCP or pain clinic to ask to have the professional portion completed.   3. I will report progress towards this goal at scheduled outreach telephone calls from the [facility] team.                                Care Plan: Medication Regimen       Problem: Medication Adherence       Long-Range Goal: I  will take my medications as prescribed.       Start Date: 8/1/2023 Expected End Date: 7/31/2024    This Visit's Progress: 30% Recent Progress: 30%    Priority: High    Note:     Barriers: doesn't always take her medications as prescribed.   Strengths: daughter can assist.   Patient expressed understanding of goal: daughter does  Action steps to achieve this goal:  1. Daughter will work with MTM to understand current medication regime.  2. Daughter will work with PCP and pain clinic to see if a psychiatrist is needed.    3. Daughter will report progress towards this goal at scheduled outreach telephone calls from the [facility] team.                               Care Plan: Help At Home       Problem: Insufficient In-home support       Long-Range Goal: I have enough support at home to live independently.       Start Date: 8/1/2023 Expected End Date: 7/31/2024    This Visit's Progress: 30% Recent Progress: 30%    Priority: High    Note:     Barriers: patient may not accept.   Strengths: daughter is supportive  Patient expressed understanding of goal: daughter does  Action steps to achieve this goal:  1. Daughter will monitor patient's needs.  2. Daughter will work with care team to get needed services.   3. Daughter will report progress towards this goal at scheduled outreach telephone calls from the [facility] team.  8-9 has a waiver, gets meals                                Intervention/Education provided during outreach: support     Outreach Frequency: monthly    Plan:   Daughter to call with update.   Care Coordinator will follow up in 30 days.

## 2023-09-28 DIAGNOSIS — F31.12 BIPOLAR AFFECTIVE DISORDER, CURRENTLY MANIC, MODERATE (H): ICD-10-CM

## 2023-09-28 RX ORDER — VENLAFAXINE HYDROCHLORIDE 75 MG/1
75 CAPSULE, EXTENDED RELEASE ORAL DAILY
Qty: 28 CAPSULE | Refills: 1 | Status: SHIPPED | OUTPATIENT
Start: 2023-09-28 | End: 2024-02-05

## 2023-09-28 RX ORDER — DIVALPROEX SODIUM 500 MG/1
500 TABLET, DELAYED RELEASE ORAL 2 TIMES DAILY
Qty: 56 TABLET | Refills: 1 | Status: SHIPPED | OUTPATIENT
Start: 2023-09-28

## 2023-09-28 NOTE — TELEPHONE ENCOUNTER
Received fax from pharmacy requesting refill(s) for     venlafaxine (EFFEXOR XR) 75 MG 24 hr capsule    Last filled 08.29.2023    And     divalproex sodium delayed-release (DEPAKOTE) 500 MG DR tablet    Date last filled 09.08.2023    Last Appt Date:08.15.2023    Next Appt scheduled: 10.03.2023    [facility], [address] [place] [address]

## 2023-09-29 ENCOUNTER — TRANSFERRED RECORDS (OUTPATIENT)
Dept: HEALTH INFORMATION MANAGEMENT | Facility: CLINIC | Age: 83
End: 2023-09-29
Payer: MEDICARE

## 2023-09-30 ENCOUNTER — TELEPHONE (OUTPATIENT)
Dept: FAMILY MEDICINE | Facility: CLINIC | Age: 83
End: 2023-09-30
Payer: MEDICARE

## 2023-09-30 DIAGNOSIS — M54.41 CHRONIC BILATERAL LOW BACK PAIN WITH BILATERAL SCIATICA: Primary | ICD-10-CM

## 2023-09-30 DIAGNOSIS — M54.42 CHRONIC BILATERAL LOW BACK PAIN WITH BILATERAL SCIATICA: Primary | ICD-10-CM

## 2023-09-30 DIAGNOSIS — G89.29 CHRONIC BILATERAL LOW BACK PAIN WITH BILATERAL SCIATICA: Primary | ICD-10-CM

## 2023-09-30 NOTE — TELEPHONE ENCOUNTER
Forms Request    Name of form/paperwork: [facility]     Have you been seen for this request:     Do we have the form: placed in Dr. Rees mailbox    When is form needed by: when complete    How would you like the form returned: fax  [phone]     Patient Notified form requests are processed in 3-5 business days: na    Okay to leave a detailed message? na

## 2023-10-02 ENCOUNTER — TELEPHONE (OUTPATIENT)
Dept: FAMILY MEDICINE | Facility: CLINIC | Age: 83
End: 2023-10-02
Payer: MEDICARE

## 2023-10-02 NOTE — TELEPHONE ENCOUNTER
Forms/Letter Request    Type of form/letter: list of psychotropic medications that patient may be allergic too    Have you been seen for this request: N/A    Do we have the form/letter: Yes: please create, stated a test may have been done a year ago?    When is form/letter needed by: ASAP, appointment at 12pm on 10/2/23, patient is seeing Dr. Harrington in [place]    How would you like the form/letter returned: Fax : [facility] [place]: [phone]    Patient Notified form requests are processed in 3-5 business days:No    Could we send this information to you in [facility] or would you prefer to receive a phone call?:   n/a

## 2023-10-03 NOTE — TELEPHONE ENCOUNTER
Ready to fax    PT recommending some further imaging, I have these orders in if she'd like to do an xray only appointment at one of the clinics to get them done.

## 2023-10-10 ENCOUNTER — TRANSFERRED RECORDS (OUTPATIENT)
Dept: HEALTH INFORMATION MANAGEMENT | Facility: CLINIC | Age: 83
End: 2023-10-10
Payer: MEDICARE

## 2023-10-11 ENCOUNTER — LAB (OUTPATIENT)
Dept: LAB | Facility: CLINIC | Age: 83
End: 2023-10-11
Payer: MEDICARE

## 2023-10-11 DIAGNOSIS — Z79.899 NEED FOR PROPHYLACTIC CHEMOTHERAPY: Primary | ICD-10-CM

## 2023-10-11 LAB — VALPROATE SERPL-MCNC: 65.4 UG/ML

## 2023-10-11 PROCEDURE — 80164 ASSAY DIPROPYLACETIC ACD TOT: CPT

## 2023-10-11 PROCEDURE — 36415 COLL VENOUS BLD VENIPUNCTURE: CPT

## 2023-10-15 NOTE — PATIENT INSTRUCTIONS - HE
POST PROCEDURE               BILATERAL GENICULAR NERVE BLOCK        Please continue your regular activity and keep track of your pain for the next 5 hours on the pain sheet that is given to you. Please return this to Pain Center tomorrow or asap via fax, mail, or drop off at clinic.    This is a diagnostic test to determine if these are the correct nerves causing your pain.    The objective of this procedure is to have your pain decrease. However it will return again that same day or a few days later. Once your pain returns it may stay the same or get worse.    You may feel some numbness or tingling, which is from the local anesthetic and may last up to several hours.    If pain returns please document on the pain analog sheet when you took your pain medication.    Fever: call if fever 100 or over, redness/warmth/swelling over injection site.    No public hot tubs/pools for a couple of days.    Monitor your response to the injection and report this at your next visit.    IF YOUR PAIN SHEET DOES NOT DEMONSTRATE ENOUGH RELIEF FOR PROCEDURE WE WILL CALL YOU TO HAVE YOU RETURN FOR REPEAT BILATERAL GENICULAR NERVE BLOCK. IF YOU HAVE ENOUGH RELIEF WE WILL SUBMIT TO YOUR INSURANCE COMPANY FOR APPROVAL. ONCE APPROVED BY YOUR INSURANCE COMPANY THE STAFF WILL CONTACT YOU TO MAKE APPOINTMENT FOR THE RADIOFREQUENCY.          IF ANY QUESTIONS CALL  PAIN CENTER  [phone]  
Ms Montoya

## 2023-10-27 ENCOUNTER — TELEPHONE (OUTPATIENT)
Dept: FAMILY MEDICINE | Facility: CLINIC | Age: 83
End: 2023-10-27
Payer: MEDICARE

## 2023-10-27 ENCOUNTER — PATIENT OUTREACH (OUTPATIENT)
Dept: CARE COORDINATION | Facility: CLINIC | Age: 83
End: 2023-10-27
Payer: MEDICARE

## 2023-10-27 DIAGNOSIS — G89.4 CHRONIC PAIN SYNDROME: ICD-10-CM

## 2023-10-27 RX ORDER — METHOCARBAMOL 500 MG/1
500 TABLET, FILM COATED ORAL 2 TIMES DAILY
Qty: 60 TABLET | Refills: 1 | Status: SHIPPED | OUTPATIENT
Start: 2023-10-27 | End: 2024-01-04

## 2023-10-27 NOTE — TELEPHONE ENCOUNTER
General Call      Reason for Call:  Manolo    What are your questions or concerns:  Alisha calling to touch base with Dr Rees to talk about pt and what is going on with her.    She has concerns about pt mentally(confusion-missing appts not normal for her)and a possible compression fracture in pelvis or back due to severe pain in hip and leg. She wonders what imaging she may have had and seeing if that explains anything.      Alisha is hoping to check in with you today and she is on verge of going out on maternity leave. If that cannot happen, would just ask she touch base with pt.    Please call Alisha today if possible at [phone]

## 2023-10-27 NOTE — PROGRESS NOTES
Clinic Care Coordination Contact  Follow Up Progress Note      Assessment: talked with daughter, who is very frustrated with patient.  Daughter had given her mom the option to have her friend, who is a nurse, set up her meds as patient was insisting.  Then, daughter finds out that patient has been setting up her meds again and was not taking her meds as prescribed. Now Melania has taken her pills and will set up a week at a time.  Melania is trying to get clarification from psychiatry at [facility], what her current meds regime should be.  She is on Haldol and Zyprexa and she doesn't know if she should be taking both daily.  [facility]  Melania is tired of mom insisting that she can do things herself, and then does not.  Her house is not cleaned unless Melania does it.  She would like her to move to assisted living, but she doesn't see the need.  Patient's car is not working at this time.     Melania feels that mom's needs exceed what she can provide.  Melania will be needing a knee replacement in the future and will be unable to do as much.  Melania's son is getting  tomorrow and her brother is coming into town.      Encourage her to call the [facility]  to get help.  She thinks she will time later next week to reach out.  Melania hopes that if she takes her medications correctly, she may do better.  She asks her mom to use her walker as she is not steady.      Care Gaps:    Health Maintenance Due   Topic Date Due    COPD ACTION PLAN  Never done    COVID-19 Vaccine (1) Never done    RSV VACCINE 60+ (1 - 1-dose 60+ series) Never done    MEDICARE ANNUAL WELLNESS VISIT  Never done    INFLUENZA VACCINE (1) 09/01/2023       Postponed to next pcp appt.     Care Plans  Care Plan: Transportation       Problem: Lack of transportation       Goal: Establish reliable transportation through [employer]       Start Date: 8/1/2023 Expected End Date: 5/1/2024    This Visit's Progress: 50% Recent  Progress: 50%    Priority: Medium    Note:     Barriers: none noted.   Strengths: daughter can assist with application.   Patient expressed understanding of goal: daughter does  Action steps to achieve this goal:  1. Daughter will print the application and will complete patient part.   2. Daughter will work with PCP or pain clinic to ask to have the professional portion completed.   3. I will report progress towards this goal at scheduled outreach telephone calls from the [facility] team.                                Care Plan: Medication Regimen       Problem: Medication Adherence       Long-Range Goal: I will take my medications as prescribed.       Start Date: 8/1/2023 Expected End Date: 7/31/2024    This Visit's Progress: 30% Recent Progress: 30%    Priority: High    Note:     Barriers: doesn't always take her medications as prescribed.   Strengths: daughter can assist.   Patient expressed understanding of goal: daughter does  Action steps to achieve this goal:  1. Daughter will work with MTM to understand current medication regime.  2. Daughter will work with PCP and pain clinic to see if a psychiatrist is needed.    3. Daughter will report progress towards this goal at scheduled outreach telephone calls from the [facility] team.                               Care Plan: Help At Home       Problem: Insufficient In-home support       Long-Range Goal: I have enough support at home to live independently.       Start Date: 8/1/2023 Expected End Date: 7/31/2024    This Visit's Progress: 30% Recent Progress: 30%    Priority: High    Note:     Barriers: patient may not accept.   Strengths: daughter is supportive  Patient expressed understanding of goal: daughter does  Action steps to achieve this goal:  1. Daughter will monitor patient's needs.  2. Daughter will work with care team to get needed services.   3. Daughter will report progress towards this goal at scheduled outreach telephone calls from the [facility] team.  8-9 has a  waiver, gets meals                                Intervention/Education provided during outreach: support.      Outreach Frequency: monthly, more frequently as needed      Plan:     Care Coordinator will follow up in two weeks and as needed.   Rochelle Weber,   [facility]  [phone]

## 2023-10-27 NOTE — LETTER
[facility] CARE COORDINATION  [facility]  October 27, 2023        Sandy Spencer  [address]  [facility] [address]          Dear Sandy,     Attached is an updated Complex Care Plan for your continued enrollment in Care Coordination. Please let us know if you have additional questions, concerns, or goals that we can assist with.    Sincerely,    Rochelle Weber,   [facility]  [phone]      [facility]  Patient Centered Plan of Care  About Me:        Patient Name:  Sandy Spencer    YOB: 1940  Age:         83 year old   [place] MRN:    [id number] Telephone Information:  Home Phone [phone]   Mobile [phone]       Address:  [address]  [place] [address] Email address:  [email or web address]      Emergency Contact(s)    Name Relationship Lgl Grd Work Phone Home Phone Mobile Phone   1. EUFEMIA PENALOZA* Daughter No  [phone]   2. MARTIN ZENG* Daughter   [phone]    3. BESSIE MOYA Other No  [phone]           Primary language:  English     needed? No   [place] [employer]:  [phone] op. 1  Other communication barriers:Caregiver    Preferred Method of Communication:     Current living arrangement: I live in a private home    Mobility Status/ Medical Equipment: Independent        Health Maintenance  Health Maintenance Reviewed: Due/Overdue   Health Maintenance Due   Topic Date Due    COPD ACTION PLAN  Never done    COVID-19 Vaccine (1) Never done    RSV VACCINE 60+ (1 - 1-dose 60+ series) Never done    MEDICARE ANNUAL WELLNESS VISIT  Never done    INFLUENZA VACCINE (1) 09/01/2023           My Access Plan  Medical Emergency 911   Primary Clinic Line [facility] - [phone]   24 Hour Appointment Line [phone] or  [phone]) (toll-free)   24 Hour Nurse Line [phone] (toll-free)   Preferred Urgent Care [facility]  [facility], [phone]     Preferred Hospital [facility]  [phone]     Preferred Pharmacy [address] [place] [address]     Behavioral Health Crisis Line The National Suicide Prevention Lifeline at 1-509.979.2851 or Text/Call 988           My Care Team Members  Patient Care Team         Relationship Specialty Notifications Start End    Caitlyn Rees MD PCP - General   7/29/15     Phone: [phone] Fax: [phone] [address] [facility] [address]    Caitlyn Rees MD Assigned PCP   6/16/21     Phone: [phone] Fax: [phone] [address] [facility] [address]    Luz Elena Ybarra MD Assigned Heart and Vascular Provider   7/16/21     Phone: [phone] Fax: [phone] [address] [facility] [address]    Magali Garrison, MARIZA Speech Language Pathologist Speech Language/Path  6/27/22     Phone: [phone]          [address] [facility] [address]    Christine Bennett PA-C Referring Physician Neurology  6/27/22     [facility] referral.    Phone: [phone] Fax: [phone]         [address] [facility] [address]    Rigoberto Castillo MD Assigned Surgical Provider   7/23/22     Phone: [phone] Fax: [phone] [address] [place] [address]    Luz Elena Ybarra MD MD Cardiovascular Disease  2/21/23     Phone: [phone] Fax: [phone] [address] [facility] [address]    Rudolph Gamez PsyD Assigned Sleep Provider   3/4/23     Phone: [phone] Fax: [phone] [address] [facility] [address]    Zamzam Hercules MD Assigned Pulmonology Provider   5/6/23      [address] [facility] [address]    Darlin Ballard, PharmD Assigned MTM Pharmacist   5/27/23     Phone: [phone] Fax: [phone]         [address] [place] [address]    John Flores, PharmD Pharmacist  Pharmacist  7/17/23     Phone: [phone]          [facility] [address] [place] [address]    Rochelle Weber LSW Lead Care Coordinator Primary Care - [facility] Admissions 7/19/23     Phone: [phone]         Deann Meeks, PhD LP Assigned Behavioral Health Provider   8/26/23     Phone: [phone] Fax: [phone] [address] [address]    Christine Bennett PA-C Assigned Neuroscience Provider   9/9/23     Phone: [phone] Fax: [phone]         [address] [facility] [address]                My Care Plans  Self Management and Treatment Plan    Care Plan  Care Plan: Transportation       Problem: Lack of transportation       Goal: Establish reliable transportation through [employer]       Start Date: 8/1/2023 Expected End Date: 5/1/2024    This Visit's Progress: 50% Recent Progress: 50%    Priority: Medium    Note:     Barriers: none noted.   Strengths: daughter can assist with application.   Patient expressed understanding of goal: daughter does  Action steps to achieve this goal:  1. Daughter will print the application and will complete patient part.   2. Daughter will work with PCP or pain clinic to ask to have the professional portion completed.   3. I will report progress towards this goal at scheduled outreach telephone calls from the [facility] team.                                Care Plan: Medication Regimen       Problem: Medication Adherence       Long-Range Goal: I will take my medications as prescribed.       Start Date: 8/1/2023 Expected End Date: 7/31/2024    This Visit's Progress: 30% Recent Progress: 30%    Priority: High    Note:     Barriers: doesn't always take her medications as prescribed.   Strengths: daughter can assist.   Patient expressed understanding of goal: daughter does  Action steps to achieve this goal:  1. Daughter will work with MTM to understand current medication regime.  2. Daughter will work with PCP and pain clinic to see if a  psychiatrist is needed.    3. Daughter will report progress towards this goal at scheduled outreach telephone calls from the [facility] team.                               Care Plan: Help At Home       Problem: Insufficient In-home support       Long-Range Goal: I have enough support at home to live independently.       Start Date: 8/1/2023 Expected End Date: 7/31/2024    This Visit's Progress: 30% Recent Progress: 30%    Priority: High    Note:     Barriers: patient may not accept.   Strengths: daughter is supportive  Patient expressed understanding of goal: daughter does  Action steps to achieve this goal:  1. Daughter will monitor patient's needs.  2. Daughter will work with care team to get needed services.   3. Daughter will report progress towards this goal at scheduled outreach telephone calls from the [facility] team.  8-9 has a waiver, gets meals                                  Advance Care Plans/Directives:   Advanced Care Plan/Directives on file:   Yes    Status of Document(s): No data recorded  Advanced Care Plan/Directives Type:   Advanced Directive - On File           My Medical and Care Information  Problem List   Patient Active Problem List   Diagnosis    Focal glomerular sclerosis    RSD lower limb, bilateral    ADD (attention deficit disorder)    RSD upper limb, right    Major depression    Hypertension    Insomnia    Hypercholesterolemia    Right rotator cuff tear    Cluster headaches    Lumbar radiculopathy    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    Temporomandibular joint-pain-dysfunction syndrome (TMJ)    Hypothyroidism    Chronic pain    Snoring    Generalized abdominal pain    Bilateral carotid artery stenosis    Coronary artery disease involving native coronary artery of native heart with angina pectoris (H24)    Other chronic pulmonary embolism without acute cor pulmonale (H)    Hematuria    Hydronephrosis    Bladder spasms    Anxiety disorder due to medical condition    Family relationship  problem    History of posttraumatic stress disorder (PTSD)    Generalized muscle weakness    Myofascial pain    Moderate major depression (H)    Elevated lipase    Abdominal bloating    Ampullary stenosis (H28)    Obstruction of biliary tree (H28)    Anemia    Aphthous ulcer of ileum    Disorder of phosphorus metabolism    Edema    Obstruction of common bile duct (H28)    Overflow diarrhea    History of partial colectomy    Reflex sympathetic dystrophy    Solitary left kidney    Flank pain    Hypocitraturia    Primary osteoarthritis of both knees    Iron deficiency anemia    Proteinuria    Concussion with loss of consciousness    Muscle weakness (generalized)    Shuffling gait    Falls frequently    Long term current use of anticoagulant therapy    COPD (chronic obstructive pulmonary disease) (H)    CKD (chronic kidney disease) stage 4, GFR 15-29 ml/min (H)    Degeneration of cervical intervertebral disc    Derangement of meniscus    Intractable chronic migraine without aura    Occipital neuralgia    Post-traumatic headache    S/p nephrectomy    Sciatic neuropathy    Pain in right knee    Leg pain, bilateral    Cervical radiculopathy    Spinal stenosis of cervical region    Fibrositis of neck    Hypokalemia    Diarrhea, unspecified type    Hypotension due to hypovolemia    Circadian rhythm sleep disorder, delayed sleep phase type    Obstructive sleep apnea (adult) (pediatric)    Pulsatile tinnitus, left ear    Sleep disturbance    Bipolar disorder, current episode manic severe with psychotic features (H)    Hallucinations    Paranoia (H)      Current Medications and Allergies:    Current Outpatient Medications   Medication Instructions    acetaminophen (TYLENOL) 1,000 mg, Oral, 3 TIMES DAILY    albuterol (PROAIR HFA/PROVENTIL HFA/VENTOLIN HFA) 108 (90 Base) MCG/ACT inhaler 2 puffs, Inhalation, EVERY 6 HOURS    allopurinol (ZYLOPRIM) 100 mg, Oral, 2 TIMES DAILY    apixaban ANTICOAGULANT (ELIQUIS) 5 mg, Oral, 2 TIMES  DAILY    azelastine (ASTELIN) 0.1 % nasal spray 2 sprays each nostril 1-2x daily as needed for nasal congestion (use nightly for first 2 week)    carvedilol (COREG) 3.125 mg, Oral, 2 TIMES DAILY, Hold for blood pressure less than 110    divalproex sodium delayed-release (DEPAKOTE) 500 mg, Oral, 2 TIMES DAILY    doxepin (SILENOR) 3 mg, Oral, AT BEDTIME PRN    ipratropium - albuterol 0.5 mg/2.5 mg/3 mL (DUONEB) 0.5-2.5 (3) MG/3ML neb solution 1 vial, EVERY 6 HOURS PRN    levETIRAcetam (KEPPRA) 250 mg, Oral, 4 TIMES DAILY    levothyroxine (SYNTHROID/LEVOTHROID) 50 mcg, Oral, DAILY    Lidocaine (LIDOCARE) 4 % Patch 2 patches, Transdermal, EVERY 24 HOURS, To prevent lidocaine toxicity, patient should be patch free for 12 hrs daily.    methocarbamol (ROBAXIN) 500 mg, Oral, 2 TIMES DAILY    OLANZapine (ZYPREXA) 15 mg, Oral, AT BEDTIME    rosuvastatin (CRESTOR) 10 mg, Oral, DAILY    tiZANidine (ZANAFLEX) 4 MG tablet One tab bedtime, and may repeat after 4 hours    torsemide (DEMADEX) 20 mg, Oral, DAILY    Turmeric 1,000 mg, Oral    valACYclovir (VALTREX) 500 mg, Oral, DAILY    venlafaxine (EFFEXOR XR) 75 mg, Oral, DAILY    Vitamin D-3 5,000 Units, Oral, DAILY         Care Coordination Start Date: 7/18/2023   Frequency of Care Coordination: monthly, more frequently as needed     Form Last Updated: 10/27/2023

## 2023-10-27 NOTE — TELEPHONE ENCOUNTER
Refill Request  Medication name: Pending Prescriptions:                       Disp   Refills    methocarbamol (ROBAXIN) 500 MG tablet     60 tab*1            Sig: Take 1 tablet (500 mg) by mouth 2 times daily    Requested Pharmacy:  [facility] [place] [address]

## 2023-10-27 NOTE — TELEPHONE ENCOUNTER
Please call and check in with patient, does she feel like things are going ok?     She has an upcoming appointment in a few weeks, please make sure to make this appointment.     PT was wondering more about her back and thought maybe we should get some more xrays when she is in.

## 2023-10-30 NOTE — TELEPHONE ENCOUNTER
Patient Returning Call    Reason for call:  return call    Information relayed to patient:  yes, see below.     Relayed message from Dr. Rees. Patient states she is continuing to have pain and cannot do activities for more than 15 minutes at a time. States that she is going to physical therapy at this time. States she has gained 45 pounds from new medication that was started.   Patient states she would like to do whatever she can to alleviate the pain, as it has been ongoing since February 2023.  Patient states she bought a back brace from [employer] and is doing all she can to help manage the pain.   Patient asked for information regarding her therapist at [facility], writer provided patient with phone number and name of encounter on 10/27 from [facility]. Brandi Herrera [phone]    Patient was also reminded of upcoming appointment with Dr. Rees on November 15th at 10:10am at the [facility]    Patient has additional questions:  No      Could we send this information to you in [facility] or would you prefer to receive a phone call?:   Patient would prefer a phone call   Okay to leave a detailed message?: Yes at Home number on file [phone] (home)

## 2023-11-06 DIAGNOSIS — E03.9 ACQUIRED HYPOTHYROIDISM: ICD-10-CM

## 2023-11-06 NOTE — TELEPHONE ENCOUNTER
Refill Request  Medication name: Pending Prescriptions:                       Disp   Refills    levothyroxine (SYNTHROID/LEVOTHROID) 50 M*90 tab*3            Sig: Take 1 tablet (50 mcg) by mouth daily    Requested Pharmacy:  [facility] [place] [address]

## 2023-11-07 ENCOUNTER — PATIENT OUTREACH (OUTPATIENT)
Dept: CARE COORDINATION | Facility: CLINIC | Age: 83
End: 2023-11-07
Payer: MEDICARE

## 2023-11-07 RX ORDER — LEVOTHYROXINE SODIUM 50 UG/1
50 TABLET ORAL DAILY
Qty: 90 TABLET | Refills: 3 | Status: SHIPPED | OUTPATIENT
Start: 2023-11-07

## 2023-11-07 NOTE — PROGRESS NOTES
Clinic Care Coordination Contact  Follow Up Progress Note      Assessment: Call from daughter. She has looked at several assisted living programs.  Mom doesn't want to move, nor does her other daughter. They are attempting to clean out the clutter at her home since she will not move at this time.  Melania feels that patient is failing and is shuffling her feet and her memory is getting worse.  She has upcoming appt with PCP and nephrology.  Melania is not happy with the psychiatrist at [facility] and that they do not respond very quickly to questions.  [facility].  She frequently changes her mind about what medication she wants to take.  Thinks there is a magic pill. Melania continue to set up her meds weekly.  She has been approved for [employer], but doesn't think that mom will ever use it. She relies on Melania or friends to  her to appts.  Melania would like to get her a life alert and will call [facility]  to see if one can be obtained.  Patient has gained so much weight and is also swollen.  Has upcoming appt with nephrology as well.  Missed appointment with her therapist and next one is on 11-24.  She sees psych on 11-28.  Melania thinks any changes to her meds can wait until next psych appt.  Melania is limited the number of appointments she will take her to per week to two.  She is also limiting the time she spends helping her mom as it brings her anxiety.     Care Gaps:    Health Maintenance Due   Topic Date Due    COPD ACTION PLAN  Never done    COVID-19 Vaccine (1) Never done    RSV VACCINE (Pregnancy & 60+) (1 - 1-dose 60+ series) Never done    MEDICARE ANNUAL WELLNESS VISIT  Never done    INFLUENZA VACCINE (1) 09/01/2023    BMP  11/30/2023    MICROALBUMIN  11/30/2023       Scheduled 11-      Care Plans  Care Plan: Medication Regimen       Problem: Medication Adherence       Long-Range Goal: I will take my medications as prescribed.       Start Date: 8/1/2023  Expected End Date: 7/31/2024    This Visit's Progress: 30% Recent Progress: 30%    Priority: High    Note:     Barriers: doesn't always take her medications as prescribed.   Strengths: daughter can assist.   Patient expressed understanding of goal: daughter does  Action steps to achieve this goal:  1. Daughter will work with MTM to understand current medication regime.  2. Daughter will work with PCP and pain clinic to see if a psychiatrist is needed.    3. Daughter will report progress towards this goal at scheduled outreach telephone calls from the [facility] team.                               Care Plan: Help At Home       Problem: Insufficient In-home support       Long-Range Goal: I have enough support at home to live independently.       Start Date: 8/1/2023 Expected End Date: 7/31/2024    This Visit's Progress: 40% Recent Progress: 30%    Priority: High    Note:     Barriers: patient may not accept.   Strengths: daughter is supportive  Patient expressed understanding of goal: daughter does  Action steps to achieve this goal:  1. Daughter will monitor patient's needs.  2. Daughter will work with care team to get needed services.   3. Daughter will report progress towards this goal at scheduled outreach telephone calls from the [facility] team.  8-9 has a waiver, gets meals                                Intervention/Education provided during outreach: support for caregiver.      Outreach Frequency: monthly, more frequently as needed    Plan:     Care Coordinator will follow up in 2-3 weeks and as needed.     [facility]/Voicemail    Clinical Data: Care Coordinator Outreach    Outreach Documentation Number of Outreach Attempt   10/27/2023   1:14 PM 1   11/7/2023   2:38 PM 1     VM left from daughter. She is spending the afternoon with her mom and is trying to make some changes.    Left message on  daughter's  voicemail with call back information and requested return call.    Plan: Care Coordinator will try to reach daughter again in  3-5 business days.    Rochelle Weber,   [facility]  [phone]

## 2023-11-13 ENCOUNTER — TRANSFERRED RECORDS (OUTPATIENT)
Dept: HEALTH INFORMATION MANAGEMENT | Facility: CLINIC | Age: 83
End: 2023-11-13
Payer: MEDICARE

## 2023-11-13 NOTE — TELEPHONE ENCOUNTER
Please call patient and remind her that she has her appointment upcoming this week and we can talk more about pain management at that time. Thanks

## 2023-11-15 ENCOUNTER — ANCILLARY PROCEDURE (OUTPATIENT)
Dept: GENERAL RADIOLOGY | Facility: CLINIC | Age: 83
End: 2023-11-15
Attending: FAMILY MEDICINE
Payer: MEDICARE

## 2023-11-15 ENCOUNTER — OFFICE VISIT (OUTPATIENT)
Dept: FAMILY MEDICINE | Facility: CLINIC | Age: 83
End: 2023-11-15
Payer: MEDICARE

## 2023-11-15 VITALS
SYSTOLIC BLOOD PRESSURE: 108 MMHG | HEIGHT: 62 IN | TEMPERATURE: 97.7 F | HEART RATE: 69 BPM | RESPIRATION RATE: 15 BRPM | BODY MASS INDEX: 32.76 KG/M2 | WEIGHT: 178 LBS | OXYGEN SATURATION: 95 % | DIASTOLIC BLOOD PRESSURE: 62 MMHG

## 2023-11-15 DIAGNOSIS — M54.41 CHRONIC BILATERAL LOW BACK PAIN WITH BILATERAL SCIATICA: Primary | ICD-10-CM

## 2023-11-15 DIAGNOSIS — I15.1 HYPERTENSION SECONDARY TO OTHER RENAL DISORDERS: ICD-10-CM

## 2023-11-15 DIAGNOSIS — N18.4 CKD (CHRONIC KIDNEY DISEASE) STAGE 4, GFR 15-29 ML/MIN (H): ICD-10-CM

## 2023-11-15 DIAGNOSIS — M54.42 CHRONIC BILATERAL LOW BACK PAIN WITH BILATERAL SCIATICA: Primary | ICD-10-CM

## 2023-11-15 DIAGNOSIS — G89.29 CHRONIC BILATERAL LOW BACK PAIN WITH BILATERAL SCIATICA: Primary | ICD-10-CM

## 2023-11-15 DIAGNOSIS — Z23 IMMUNIZATION DUE: ICD-10-CM

## 2023-11-15 DIAGNOSIS — M25.551 HIP PAIN, RIGHT: ICD-10-CM

## 2023-11-15 PROCEDURE — G0008 ADMIN INFLUENZA VIRUS VAC: HCPCS | Performed by: FAMILY MEDICINE

## 2023-11-15 PROCEDURE — 90662 IIV NO PRSV INCREASED AG IM: CPT | Performed by: FAMILY MEDICINE

## 2023-11-15 PROCEDURE — 99214 OFFICE O/P EST MOD 30 MIN: CPT | Mod: 25 | Performed by: FAMILY MEDICINE

## 2023-11-15 PROCEDURE — 73502 X-RAY EXAM HIP UNI 2-3 VIEWS: CPT | Mod: TC | Performed by: RADIOLOGY

## 2023-11-15 RX ORDER — CYCLOSPORINE 0.5 MG/ML
EMULSION OPHTHALMIC
COMMUNITY
Start: 2023-10-06 | End: 2024-09-03

## 2023-11-15 RX ORDER — RESPIRATORY SYNCYTIAL VIRUS VACCINE 120MCG/0.5
0.5 KIT INTRAMUSCULAR ONCE
Qty: 1 EACH | Refills: 0 | Status: CANCELLED | OUTPATIENT
Start: 2023-11-15 | End: 2023-11-15

## 2023-11-15 ASSESSMENT — ANXIETY QUESTIONNAIRES
5. BEING SO RESTLESS THAT IT IS HARD TO SIT STILL: NOT AT ALL
1. FEELING NERVOUS, ANXIOUS, OR ON EDGE: SEVERAL DAYS
2. NOT BEING ABLE TO STOP OR CONTROL WORRYING: SEVERAL DAYS
6. BECOMING EASILY ANNOYED OR IRRITABLE: MORE THAN HALF THE DAYS
GAD7 TOTAL SCORE: 8
4. TROUBLE RELAXING: NEARLY EVERY DAY
3. WORRYING TOO MUCH ABOUT DIFFERENT THINGS: SEVERAL DAYS
IF YOU CHECKED OFF ANY PROBLEMS ON THIS QUESTIONNAIRE, HOW DIFFICULT HAVE THESE PROBLEMS MADE IT FOR YOU TO DO YOUR WORK, TAKE CARE OF THINGS AT HOME, OR GET ALONG WITH OTHER PEOPLE: VERY DIFFICULT
GAD7 TOTAL SCORE: 8
7. FEELING AFRAID AS IF SOMETHING AWFUL MIGHT HAPPEN: NOT AT ALL

## 2023-11-15 ASSESSMENT — PATIENT HEALTH QUESTIONNAIRE - PHQ9
10. IF YOU CHECKED OFF ANY PROBLEMS, HOW DIFFICULT HAVE THESE PROBLEMS MADE IT FOR YOU TO DO YOUR WORK, TAKE CARE OF THINGS AT HOME, OR GET ALONG WITH OTHER PEOPLE: SOMEWHAT DIFFICULT
SUM OF ALL RESPONSES TO PHQ QUESTIONS 1-9: 14
SUM OF ALL RESPONSES TO PHQ QUESTIONS 1-9: 14

## 2023-11-15 NOTE — PROGRESS NOTES
Assessment & Plan     Chronic bilateral low back pain with bilateral sciatica  -Continues with PT. Wonders about an SI joint injection. Has followed with the pain clinic, not interested in narcotics, but limited pain relief options given her advanced CKD. Recommended she discuss this with her nephrology group to see if she can use some sparing Voltaren.   -Consider referral back to the spine clinic for repeat injections.     Hip pain, right  -xray shows arthritis, will consider referral for hip injection.   - XR Hip Right 2-3 Views    Hypertension secondary to other renal disorders  -BP under good control today    CKD (chronic kidney disease) stage 4, GFR 15-29 ml/min (H)  -Following with nephrology.     Immunization due  - INFLUENZA VACCINE 65+ (FLUZONE HD)      38 minutes spent by me on the date of the encounter doing chart review, history and exam, documentation and further activities per the note              Caitlyn Rees MD  [facility]    Alicia Delgado is a 83 year old, presenting for the following health issues:  RECHECK (Would like flu shot today. Concerns about renal failure and weight gain. )      11/15/2023    10:15 AM   Additional Questions   Roomed by Charu       History of Present Illness       Back Pain:  She presents for follow up of back pain. Patient's back pain is a chronic problem.  Location of back pain:  Right lower back, left lower back and left side of neck  Description of back pain: burning, gnawing and sharp  Back pain spreads: nowhere    Since patient first noticed back pain, pain is: unchanged  Does back pain interfere with her job:  Not applicable       CKD: She uses over the counter pain medication, including tramadol, two times daily.    Mental Health Follow-up:  Patient presents to follow-up on Depression & Anxiety.Patient's depression since last visit has been:  Worse  The patient is having other symptoms associated with depression.  Patient's  "anxiety since last visit has been:  Worse  The patient is having other symptoms associated with anxiety.  Any significant life events: relationship concerns and health concerns  Patient is not feeling anxious or having panic attacks.  Patient has no concerns about alcohol or drug use.    Hyperlipidemia:  She presents for follow up of hyperlipidemia.   She is taking medication to lower cholesterol. She is having myalgia or other side effects to statin medications.    Hypothyroidism:     Since last visit, patient describes the following symptoms::  Anxiety, Depression, Fatigue and Weight gain    Weight gain::  >20 lbs.    Headaches:   Since the patient's last clinic visit, headaches are: improved  The patient is getting headaches:  Several per week  She is not able to do normal daily activities when she has a migraine.  The patient is taking the following rescue/relief medications:  Tylenol   Patient states \"I get no relief\" from the rescue/relief medications.   The patient is taking the following medications to prevent migraines:  Other  In the past 4 weeks, the patient has gone to an Urgent Care or Emergency Room 0 times times due to headaches.    Vascular Disease:  She presents for follow up of vascular disease.     She never takes nitroglycerin. She is not taking daily aspirin.    She eats 2-3 servings of fruits and vegetables daily.She consumes 1 sweetened beverage(s) daily.She exercises with enough effort to increase her heart rate 9 or less minutes per day.  She exercises with enough effort to increase her heart rate 3 or less days per week.   She is taking medications regularly.       She comes in today for several issues.     She does note that she had her kidney appointment yesterday and did have her water pill reduced by 20 mg as she looked dehydrated.     She does note that she has gained 20 pounds since she started her anti psychotics. She has seen her psychiatrist two times. She does have a counselor as " "well. Her mental health is managed in that she is not manic, but she is depressed as she is not sleeping well. She is not sleeping well still.     She does feel that her bursitis is slowly getting better.     She does feel that she may cancel the injections at [facility] as she does not have any pain any more. The massage is helping though. She does typically get them in the occipital and left trap. She does have a PT that is taping her when she goes in.     She did go to the chiropractor as well.     She does use biofreeze, patches. She is using stew stockings as well.       Review of Systems   Per above      Objective    /62   Pulse 69   Temp 97.7  F (36.5  C)   Resp 15   Ht 1.575 m (5' 2\")   Wt 80.7 kg (178 lb)   LMP  (LMP Unknown)   SpO2 95%   BMI 32.56 kg/m    Body mass index is 32.56 kg/m .  Physical Exam   GENERAL: healthy, alert and no distress  NECK: no adenopathy, no asymmetry, masses, or scars and thyroid normal to palpation  RESP: lungs clear to auscultation - no rales, rhonchi or wheezes  CV: regular rate and rhythm, normal S1 S2,, no murmur, click or rub, no peripheral edema and peripheral pulses strong  MS: no gross musculoskeletal defects noted, no edema  MS: hip and back exams deferred today    XR Hip Right 2-3 Views    Result Date: 11/15/2023  EXAM: XR HIP RIGHT 2-3 VIEWS LOCATION: [facility] DATE: 11/15/2023 INDICATION: Right hip pain for several months. COMPARISON: 05/08/2023 radiographs.     IMPRESSION: Moderate to severe osteoarthrosis of the right hip, progressed. No evidence of fracture or osteonecrosis.             "

## 2023-11-16 ENCOUNTER — TELEPHONE (OUTPATIENT)
Dept: FAMILY MEDICINE | Facility: CLINIC | Age: 83
End: 2023-11-16
Payer: MEDICARE

## 2023-11-16 DIAGNOSIS — M25.551 HIP PAIN, RIGHT: Primary | ICD-10-CM

## 2023-11-16 PROBLEM — N20.0 RENAL CALCULUS, LEFT: Status: ACTIVE | Noted: 2020-10-26

## 2023-11-16 NOTE — TELEPHONE ENCOUNTER
Called and spoke with pt. Pt is interested in another injection and states she thinks she needs another one in her SI joint as well as one for the hip but will do whatever Dr. Rees thinks is best. Pt states that last injection was done in the groin area and did not feel as though this was helpful.       Left message for daughter Viola to call back. Please relay below message from Dr. Rees when she calls back .

## 2023-11-16 NOTE — TELEPHONE ENCOUNTER
----- Message from Caitlyn Rees MD sent at 11/15/2023  9:24 PM CST -----  Please let Sandy (and maybe her daughter Pema) that her xray shows moderate to severe arthritis but nothing else that is surprising which is great news.     We can have her consider doing another injection if she wants otherwise since it's getting a bit better she can continue doing what she is doing

## 2023-11-16 NOTE — TELEPHONE ENCOUNTER
Patient Returning Call    Reason for call:  return call    Information relayed to patient:  relayed information from Dr. Rees, and patients response    Patient has additional questions:  No.      [facility] or would you prefer to receive a phone call?:   n/a

## 2023-11-16 NOTE — TELEPHONE ENCOUNTER
I can order a hip injection without her seeing anyone else.     If she wants an SI joint injection as well, she will either need to reach out to the pain clinic or I can have her go back to Dr. Ramirez at the spine clinic in [place] and he can help arrange both.     Let us know what she would like to do.

## 2023-11-17 ENCOUNTER — TELEPHONE (OUTPATIENT)
Dept: FAMILY MEDICINE | Facility: CLINIC | Age: 83
End: 2023-11-17
Payer: MEDICARE

## 2023-11-17 ENCOUNTER — PATIENT OUTREACH (OUTPATIENT)
Dept: CARE COORDINATION | Facility: CLINIC | Age: 83
End: 2023-11-17
Payer: MEDICARE

## 2023-11-17 DIAGNOSIS — I27.82 OTHER CHRONIC PULMONARY EMBOLISM WITHOUT ACUTE COR PULMONALE (H): ICD-10-CM

## 2023-11-17 NOTE — PROGRESS NOTES
Clinic Care Coordination Contact  Follow Up Progress Note      Assessment: call from daughter Melania who has been spending 25 hours per week helping her mom and it isn't enough. Mom is not satisfied and asked for more and complains about things being Melania's fault.  She missed her virtual mental health appt today.  Melania talked to her cousin who is trained in mental health and this person explained that patient is having a manic episode.  Last time Melania called, her mom was lethargic.  Now, she wants to shop, but furniture, etc.  Melania is trying to understand Bipolar disorder.  Wants to get more information.  Referred her back to [facility].  She will look at their website.  She has contacted [facility] to get an appt. Mom doesn't like Dr. Harrington.  She will see if there is a different medication provider she could have, but doesn't think mom will be happy with anyone.  She is trying to get mom to get rid of things with little success.  Her sister doesn't want mom to move.  Mom doesn't want to spend $3.50 to use [employer].  Encouraged Melania to set limits and to monitor her mom daily. She will ask her sister to be involved as well.  If she escalates, she will get her to ED.  Melania asked how she may be able to get a guardian, discussed the process.      Care Gaps:    Health Maintenance Due   Topic Date Due    COPD ACTION PLAN  Never done    COVID-19 Vaccine (1) Never done    RSV VACCINE (Pregnancy & 60+) (1 - 1-dose 60+ series) Never done    MEDICARE ANNUAL WELLNESS VISIT  Never done    BMP  11/30/2023    MICROALBUMIN  11/30/2023       Postponed to next pcp appt.      Care Plans  Care Plan: Medication Regimen       Problem: Medication Adherence       Long-Range Goal: I will take my medications as prescribed.       Start Date: 8/1/2023 Expected End Date: 7/31/2024    This Visit's Progress: 30% Recent Progress: 30%    Priority: High    Note:     Barriers: doesn't always take her medications as prescribed.   Strengths:  daughter can assist.   Patient expressed understanding of goal: daughter does  Action steps to achieve this goal:  1. Daughter will work with MTM to understand current medication regime.  2. Daughter will work with PCP and pain clinic to see if a psychiatrist is needed.    3. Daughter will report progress towards this goal at scheduled outreach telephone calls from the [facility] team.                               Care Plan: Help At Home       Problem: Insufficient In-home support       Long-Range Goal: I have enough support at home to live independently.       Start Date: 8/1/2023 Expected End Date: 7/31/2024    This Visit's Progress: 40% Recent Progress: 40%    Priority: High    Note:     Barriers: patient may not accept.   Strengths: daughter is supportive  Patient expressed understanding of goal: daughter does  Action steps to achieve this goal:  1. Daughter will monitor patient's needs.  2. Daughter will work with care team to get needed services.   3. Daughter will report progress towards this goal at scheduled outreach telephone calls from the [facility] team.  8-9 has a waiver, gets meals                                Intervention/Education provided during outreach: Education and support, referred to [facility].     Outreach Frequency: monthly, more frequently as needed    Plan:     Care Coordinator will follow up in 30 days.  Rochelle Weber,   [facility]  [phone]

## 2023-11-24 DIAGNOSIS — M1A.39X0 CHRONIC GOUT DUE TO RENAL IMPAIRMENT OF MULTIPLE SITES WITHOUT TOPHUS: ICD-10-CM

## 2023-11-24 RX ORDER — ALLOPURINOL 100 MG/1
100 TABLET ORAL 2 TIMES DAILY
Qty: 60 TABLET | Refills: 3 | Status: SHIPPED | OUTPATIENT
Start: 2023-11-24

## 2023-11-24 NOTE — TELEPHONE ENCOUNTER
Received fax from pharmacy requesting refill(s) for     allopurinol (ZYLOPRIM) 100 MG tablet    Date last filled 10/27/2023    Last Appt Date:08/15/2023    Next Appt scheduled: None    Ph[facility]   [facility] [address] [place] [address] [place] [address] for processing    Sammie Go MA  [facility] [place]

## 2023-11-29 ENCOUNTER — TELEPHONE (OUTPATIENT)
Dept: ENDOCRINOLOGY | Facility: CLINIC | Age: 83
End: 2023-11-29
Payer: MEDICARE

## 2023-11-29 DIAGNOSIS — M81.0 OSTEOPOROSIS, UNSPECIFIED OSTEOPOROSIS TYPE, UNSPECIFIED PATHOLOGICAL FRACTURE PRESENCE: Primary | ICD-10-CM

## 2023-11-29 NOTE — TELEPHONE ENCOUNTER
Lvm, unable to reach pt, please contact pt and have pt do lab before her appointment with Caroline Sumner on 12/8/23.

## 2023-11-30 NOTE — TELEPHONE ENCOUNTER
"Called daughter \"Viola\", stated will try to do a walk in early next week for a lab drawn and if not they will call back to reschedule the appointment with josesito Sumner.   "

## 2023-12-01 ENCOUNTER — TELEPHONE (OUTPATIENT)
Dept: FAMILY MEDICINE | Facility: CLINIC | Age: 83
End: 2023-12-01

## 2023-12-01 ENCOUNTER — LAB (OUTPATIENT)
Dept: LAB | Facility: CLINIC | Age: 83
End: 2023-12-01
Payer: MEDICARE

## 2023-12-01 DIAGNOSIS — N18.4 CKD (CHRONIC KIDNEY DISEASE) STAGE 4, GFR 15-29 ML/MIN (H): Primary | ICD-10-CM

## 2023-12-01 DIAGNOSIS — D63.1 ANEMIA IN STAGE 4 CHRONIC KIDNEY DISEASE (H): ICD-10-CM

## 2023-12-01 DIAGNOSIS — M81.0 OSTEOPOROSIS, UNSPECIFIED OSTEOPOROSIS TYPE, UNSPECIFIED PATHOLOGICAL FRACTURE PRESENCE: ICD-10-CM

## 2023-12-01 DIAGNOSIS — N18.4 ANEMIA IN STAGE 4 CHRONIC KIDNEY DISEASE (H): ICD-10-CM

## 2023-12-01 LAB
CREAT UR-MCNC: 23.8 MG/DL
ERYTHROCYTE [DISTWIDTH] IN BLOOD BY AUTOMATED COUNT: 14.3 % (ref 10–15)
HCT VFR BLD AUTO: 31.4 % (ref 35–47)
HGB BLD-MCNC: 10.3 G/DL (ref 11.7–15.7)
MCH RBC QN AUTO: 33.8 PG (ref 26.5–33)
MCHC RBC AUTO-ENTMCNC: 32.8 G/DL (ref 31.5–36.5)
MCV RBC AUTO: 103 FL (ref 78–100)
MICROALBUMIN UR-MCNC: <12 MG/L
MICROALBUMIN/CREAT UR: NORMAL MG/G{CREAT}
PLATELET # BLD AUTO: 201 10E3/UL (ref 150–450)
RBC # BLD AUTO: 3.05 10E6/UL (ref 3.8–5.2)
WBC # BLD AUTO: 5.8 10E3/UL (ref 4–11)

## 2023-12-01 PROCEDURE — 82043 UR ALBUMIN QUANTITATIVE: CPT

## 2023-12-01 PROCEDURE — 36415 COLL VENOUS BLD VENIPUNCTURE: CPT

## 2023-12-01 PROCEDURE — 80048 BASIC METABOLIC PNL TOTAL CA: CPT

## 2023-12-01 PROCEDURE — 82306 VITAMIN D 25 HYDROXY: CPT

## 2023-12-01 PROCEDURE — 85027 COMPLETE CBC AUTOMATED: CPT

## 2023-12-01 PROCEDURE — 82570 ASSAY OF URINE CREATININE: CPT

## 2023-12-01 PROCEDURE — 80061 LIPID PANEL: CPT

## 2023-12-01 PROCEDURE — 84460 ALANINE AMINO (ALT) (SGPT): CPT

## 2023-12-01 NOTE — TELEPHONE ENCOUNTER
Patient's daughter is requesting to have form mailed to her address:    Viola Benton  [address] [facility] [address]

## 2023-12-01 NOTE — TELEPHONE ENCOUNTER
Per Dr Lee Ann perez to take over Keppra and allopurinol medication. [facility] contact number given to Viola for hip injection under ultrasound.  Viola notified of all information.

## 2023-12-02 LAB
ALT SERPL W P-5'-P-CCNC: 10 U/L (ref 0–50)
ANION GAP SERPL CALCULATED.3IONS-SCNC: 12 MMOL/L (ref 7–15)
BUN SERPL-MCNC: 39.8 MG/DL (ref 8–23)
CALCIUM SERPL-MCNC: 9.6 MG/DL (ref 8.8–10.2)
CALCIUM SERPL-MCNC: 9.6 MG/DL (ref 8.8–10.2)
CHLORIDE SERPL-SCNC: 97 MMOL/L (ref 98–107)
CHOLEST SERPL-MCNC: 224 MG/DL
CREAT SERPL-MCNC: 1.83 MG/DL (ref 0.51–0.95)
DEPRECATED HCO3 PLAS-SCNC: 29 MMOL/L (ref 22–29)
EGFRCR SERPLBLD CKD-EPI 2021: 27 ML/MIN/1.73M2
GLUCOSE SERPL-MCNC: 99 MG/DL (ref 70–99)
HDLC SERPL-MCNC: 84 MG/DL
LDLC SERPL CALC-MCNC: 123 MG/DL
NONHDLC SERPL-MCNC: 140 MG/DL
POTASSIUM SERPL-SCNC: 4.3 MMOL/L (ref 3.4–5.3)
SODIUM SERPL-SCNC: 138 MMOL/L (ref 135–145)
TRIGL SERPL-MCNC: 86 MG/DL
VIT D+METAB SERPL-MCNC: 65 NG/ML (ref 20–50)

## 2023-12-08 ENCOUNTER — VIRTUAL VISIT (OUTPATIENT)
Dept: ENDOCRINOLOGY | Facility: CLINIC | Age: 83
End: 2023-12-08
Payer: MEDICARE

## 2023-12-08 DIAGNOSIS — M81.0 AGE-RELATED OSTEOPOROSIS WITHOUT CURRENT PATHOLOGICAL FRACTURE: Primary | ICD-10-CM

## 2023-12-08 PROCEDURE — 99214 OFFICE O/P EST MOD 30 MIN: CPT | Mod: VID | Performed by: NURSE PRACTITIONER

## 2023-12-08 NOTE — PROGRESS NOTES
[facility]  ENDOCRINOLOGY    Osteoporosis Follow Up 12/8/2023    Sandy Spencer, 1940, [id number]          Reason for visit      1. Age-related osteoporosis without current pathological fracture        History     Sandy Spencer is a very pleasant 83 year old old female who presents for follow up.   SUMMARY:    Sandy is seen in follow-up for Osteoporosis. She reports a fall this year after she came home from the removal of a skin cancer.  She is known to fall frequently, as well as having a shuffling gait.     She remains on Prolia injections without difficulties. Her current Dexa Scan is included in this visit. She is overdue for another one.  Also, it was done on a different scanner, so a direct comparison was not made. Vit D level is 65, and Calcium level is 9.6. She remains in Renal Failure.     Risk Factors     The following high- risk conditions have been ruled out: celiac disease, eating disorders, gastric bypass, hyperparathyroidism, inflammatory bowel disease, hyperthyroidism, rheumatoid arthritis, lupus, chronic kidney disease.     Sandy Spencer has the following risk factors: Age, Female gender,  and Family history of osteoporosis     She is not on high risk medications such as glucocorticoids, anti-coagulants, anti-convulsants, chemotherapy.    Patient deniesBreast cancer and Family history of breast cancer.      Past Medical History     Patient Active Problem List   Diagnosis    Focal glomerular sclerosis    Complex regional pain syndrome type 1 of both lower extremities    ADD (attention deficit disorder)    RSD upper limb, right    Major depression    HTN (hypertension)    Insomnia    Pure hypercholesterolemia    Right rotator cuff tear    Cluster headaches    Lumbar radiculopathy    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    Temporomandibular joint-pain-dysfunction syndrome (TMJ)    Hypothyroidism    Chronic pain    Snoring    Generalized abdominal pain    Bilateral  carotid artery stenosis    Coronary artery disease involving native coronary artery of native heart with angina pectoris (H24)    Other chronic pulmonary embolism without acute cor pulmonale (H)    Hematuria    Hydronephrosis    Bladder spasms    Anxiety disorder due to medical condition    Family relationship problem    History of posttraumatic stress disorder (PTSD)    Generalized muscle weakness    Myofascial pain    Moderate major depression (H)    Elevated lipase    Abdominal bloating    Ampullary stenosis (H28)    Obstruction of biliary tree (H28)    Anemia    Aphthous ulcer of ileum    Disorder of phosphorus metabolism    Edema    Obstruction of common bile duct (H28)    Overflow diarrhea    History of partial colectomy    Reflex sympathetic dystrophy    Solitary left kidney    Flank pain    Hypocitraturia    Primary osteoarthritis of both knees    Iron deficiency anemia    Proteinuria    Concussion with loss of consciousness    Muscle weakness (generalized)    Shuffling gait    Falls frequently    Long term current use of anticoagulant therapy    COPD (chronic obstructive pulmonary disease) (H)    CKD (chronic kidney disease) stage 4, GFR 15-29 ml/min (H)    Cervical disc disorder with radiculopathy    Derangement of meniscus    Intractable chronic migraine without aura    Occipital neuralgia    Post-traumatic headache    S/p nephrectomy    Sciatic neuropathy    Pain in right knee    Leg pain, bilateral    Cervical radiculopathy    Spinal stenosis of cervical region    Fibrositis of neck    Hypokalemia    Diarrhea, unspecified type    Hypotension due to hypovolemia    Circadian rhythm sleep disorder, delayed sleep phase type    Obstructive sleep apnea (adult) (pediatric)    Pulsatile tinnitus, left ear    Sleep disturbance    Bipolar disorder, current episode manic severe with psychotic features (H)    Hallucinations    Paranoia (H)    Renal calculus, left       Family History       family history includes  Alzheimer Disease in her sister; Aortic aneurysm in her mother; Cerebrovascular Disease in her father; Dementia in her maternal grandmother, mother, and sister; Heart Disease in her father and mother; Kidney Disease in her father and mother; Other - See Comments in her brother; Sleep Apnea in her sister.    Social History      reports that she quit smoking about 23 years ago. Her smoking use included cigarettes. She has a 20.00 pack-year smoking history. She has been exposed to tobacco smoke. She has never used smokeless tobacco. She reports that she does not drink alcohol and does not use drugs.      Review of Systems     Patient has no polyuria or polydipsia, no chest pain, dyspnea or TIA's, no numbness, tingling or pain in extremities  Remainder negative except as noted in HPI.    Vital Signs     LMP  (LMP Unknown)     Physical Exam     Constitutional:  Well developed, Well nourished  HENT:  Normocephalic,   Neck: normal in appearance,   Eyes:  PERRL, Conjunctiva pink  Respiratory:  No respiratory distress  Skin: No acanthosis nigricans, lipoatrophy or lipodystrophy  Neurologic:  Alert & oriented x 3, nonfocal  Psychiatric:  Affect, Mood, Insight appropriate      Assessment     1. Age-related osteoporosis without current pathological fracture        Plan     Sandy will continue to take Prolia injections. She will also call and schedule her next Dexa Scan. I will see her next year.      Caroline Sumner NP   Endocrinology  12/8/2023  3:34 PM    Current Medications     Outpatient Medications Prior to Visit   Medication Sig Dispense Refill    acetaminophen (TYLENOL) 500 MG tablet Take 1,000 mg by mouth 3 times daily      albuterol (PROAIR HFA/PROVENTIL HFA/VENTOLIN HFA) 108 (90 Base) MCG/ACT inhaler Inhale 2 puffs into the lungs every 6 hours 18 g 4    allopurinol (ZYLOPRIM) 100 MG tablet Take 1 tablet (100 mg) by mouth 2 times daily 60 tablet 3    apixaban ANTICOAGULANT (ELIQUIS) 5 MG tablet Take 1 tablet (5 mg) by  mouth 2 times daily 180 tablet 4    azelastine (ASTELIN) 0.1 % nasal spray 2 sprays each nostril 1-2x daily as needed for nasal congestion (use nightly for first 2 week) 30 mL 11    carvedilol (COREG) 3.125 MG tablet Take 1 tablet (3.125 mg) by mouth 2 times daily Hold for blood pressure less than 110 180 tablet 3    Cholecalciferol (VITAMIN D-3) 125 MCG (5000 UT) TABS Take 5,000 Units by mouth daily      divalproex sodium delayed-release (DEPAKOTE) 500 MG DR tablet Take 1 tablet (500 mg) by mouth 2 times daily 56 tablet 1    doxepin (SILENOR) 3 MG tablet Take 1 tablet (3 mg) by mouth nightly as needed for sleep 10 tablet 1    ipratropium - albuterol 0.5 mg/2.5 mg/3 mL (DUONEB) 0.5-2.5 (3) MG/3ML neb solution Take 1 vial by nebulization every 6 hours as needed for shortness of breath, wheezing or cough      levETIRAcetam (KEPPRA) 250 MG tablet Take 1 tablet (250 mg) by mouth 4 times daily 112 tablet 1    levothyroxine (SYNTHROID/LEVOTHROID) 50 MCG tablet Take 1 tablet (50 mcg) by mouth daily 90 tablet 3    Lidocaine (LIDOCARE) 4 % Patch Place 2 patches onto the skin every 24 hours To prevent lidocaine toxicity, patient should be patch free for 12 hrs daily. 60 patch 5    methocarbamol (ROBAXIN) 500 MG tablet Take 1 tablet (500 mg) by mouth 2 times daily 60 tablet 1    OLANZapine (ZYPREXA) 15 MG tablet Take 1 tablet (15 mg) by mouth At Bedtime 28 tablet 3    RESTASIS 0.05 % ophthalmic emulsion PLACE 1 DROP INTO BOTH EYES TWO TIMES A DAY      rosuvastatin (CRESTOR) 10 MG tablet Take 1 tablet (10 mg) by mouth daily 30 tablet 5    torsemide (DEMADEX) 20 MG tablet Take 1 tablet (20 mg) by mouth daily 30 tablet 5    Turmeric 500 MG CAPS Take 1,000 mg by mouth      valACYclovir (VALTREX) 500 MG tablet Take 1 tablet (500 mg) by mouth daily 90 tablet 3    venlafaxine (EFFEXOR XR) 75 MG 24 hr capsule Take 1 capsule (75 mg) by mouth daily 28 capsule 1    tiZANidine (ZANAFLEX) 4 MG tablet One tab bedtime, and may repeat after 4  hours (Patient not taking: Reported on 12/8/2023) 60 tablet 3     Facility-Administered Medications Prior to Visit   Medication Dose Route Frequency Provider Last Rate Last Admin    denosumab (PROLIA) injection 60 mg  60 mg Subcutaneous Q6 Months Caroline Sumner NP   60 mg at 08/03/23 1238         Lab Results     TSH   Date Value Ref Range Status   06/14/2023 1.19 0.30 - 4.20 uIU/mL Final   06/14/2022 0.60 0.30 - 5.00 uIU/mL Final     Phosphorus   Date Value Ref Range Status   02/09/2023 3.2 2.5 - 4.5 mg/dL Final     Iron   Date Value Ref Range Status   02/09/2023 65 37 - 145 ug/dL Final           Imaging Results   Last DEXA scan:  No valid procedures specified.    This result has an attachment that is not available.   3/9/2021       RE: Sandy Spencer   YOB: 1940         Dear Caroline Sumner,     Patient Profile:   80 y.o. female, postmenopausal, is here for the first bone density test.   History of fractures - None. Family history of osteoporosis - None.    Family history of hip fracture: None. Smoking history - Past. Osteoporosis   treatment past -  Yes;  Bisphosphonates. Osteoporosis treatment current -   No.  Chronic medical problems - Chronic low back problems, History of   kidney stones and Spine surgery. High risk medications -  Blood thinner   (Coumadin or Heparin);  Yes, Currently and Thyroid;  Yes, Currently.       Assessment:     1. The spine bone density L1-L4 with T-score 2.0 and significant   artifacts.   2. Femoral bone densities show left femoral neck T- score -1.8 and right   femoral neck T-score -1.5.   3. Trabecular bone score indicates good trabecular bone architecture.   4. Left forearm bone density with T-score -2.3.     80 y.o. female with LOW BONE DENSITY (OSTEOPENIA) and HIGH fracture risk,   adjusted for the TBS, with major osteoporotic fracture risk 12.9% and hip   fracture risk 3.9%.     Previous scan was done on a different machine and is not directly   comparable with  the current study.     Recommendations:   Appropriate evaluation and treatment recommended with follow up bone   density scan after 1 year of active treatment.       Bone densitometry was performed on your patient using our [employer] iDXA   densitometer. The results are summarized and a copy of the actual scans   are included for your review. In conformity with the International Society   of Clinical Densitometry's most recent position statement for DXA   interpretation (2015), the diagnosis will be made on the lowest measured   T-score of the lumbar spine, femoral neck, total proximal femur or 33%   radius. Note the change in terminology for diagnostic classification from   OSTEOPENIA to LOW BONE MASS. All trending for sequential exams will be   done using multiple vertebrae or the total proximal femur. Fracture risk   is based on the WHO Fracture Risk Assessment Tool (FRAX). If additional   information is needed or if you would like to discuss the results, please   do not hesitate to call me.       Thank you for referring this patient to [facility]   [facility]. We are happy to be of service in support of you and your   practice. If you have any questions or suggestions to improve our service,   please call me at [phone].     Sincerely,     DEJAH Billy.   [facility]     Virtual Visit Details    Type of service:  Video Visit   Video Start Time: 1530    Video End Time: 1550    Originating Location (pt. Location): Home    Distant Location (provider location):  On-site  Platform used for Video Visit: Rogelio

## 2023-12-08 NOTE — LETTER
12/8/2023         RE: Sandy Spencer  [address]  [facility] [address]        Dear Colleague,    Thank you for referring your patient, Sandy Spencer, to the [facility] [place]. Please see a copy of my visit note below.    [facility]  ENDOCRINOLOGY    Osteoporosis Follow Up 12/8/2023    Sandy Spencer, 1940, [id number]          Reason for visit      1. Age-related osteoporosis without current pathological fracture        History     Sandy Spencer is a very pleasant 83 year old old female who presents for follow up.   SUMMARY:    Sandy is seen in follow-up for Osteoporosis. She reports a fall this year after she came home from the removal of a skin cancer.  She is known to fall frequently, as well as having a shuffling gait.     She remains on Prolia injections without difficulties. Her current Dexa Scan is included in this visit. She is overdue for another one.  Also, it was done on a different scanner, so a direct comparison was not made. Vit D level is 65, and Calcium level is 9.6. She remains in Renal Failure.     Risk Factors     The following high- risk conditions have been ruled out: celiac disease, eating disorders, gastric bypass, hyperparathyroidism, inflammatory bowel disease, hyperthyroidism, rheumatoid arthritis, lupus, chronic kidney disease.     Sandy Spencer has the following risk factors: Age, Female gender,  and Family history of osteoporosis     She is not on high risk medications such as glucocorticoids, anti-coagulants, anti-convulsants, chemotherapy.    Patient deniesBreast cancer and Family history of breast cancer.      Past Medical History     Patient Active Problem List   Diagnosis     Focal glomerular sclerosis     Complex regional pain syndrome type 1 of both lower extremities     ADD (attention deficit disorder)     RSD upper limb, right     Major depression     HTN (hypertension)     Insomnia     Pure hypercholesterolemia      Right rotator cuff tear     Cluster headaches     Lumbar radiculopathy     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     Temporomandibular joint-pain-dysfunction syndrome (TMJ)     Hypothyroidism     Chronic pain     Snoring     Generalized abdominal pain     Bilateral carotid artery stenosis     Coronary artery disease involving native coronary artery of native heart with angina pectoris (H24)     Other chronic pulmonary embolism without acute cor pulmonale (H)     Hematuria     Hydronephrosis     Bladder spasms     Anxiety disorder due to medical condition     Family relationship problem     History of posttraumatic stress disorder (PTSD)     Generalized muscle weakness     Myofascial pain     Moderate major depression (H)     Elevated lipase     Abdominal bloating     Ampullary stenosis (H28)     Obstruction of biliary tree (H28)     Anemia     Aphthous ulcer of ileum     Disorder of phosphorus metabolism     Edema     Obstruction of common bile duct (H28)     Overflow diarrhea     History of partial colectomy     Reflex sympathetic dystrophy     Solitary left kidney     Flank pain     Hypocitraturia     Primary osteoarthritis of both knees     Iron deficiency anemia     Proteinuria     Concussion with loss of consciousness     Muscle weakness (generalized)     Shuffling gait     Falls frequently     Long term current use of anticoagulant therapy     COPD (chronic obstructive pulmonary disease) (H)     CKD (chronic kidney disease) stage 4, GFR 15-29 ml/min (H)     Cervical disc disorder with radiculopathy     Derangement of meniscus     Intractable chronic migraine without aura     Occipital neuralgia     Post-traumatic headache     S/p nephrectomy     Sciatic neuropathy     Pain in right knee     Leg pain, bilateral     Cervical radiculopathy     Spinal stenosis of cervical region     Fibrositis of neck     Hypokalemia     Diarrhea, unspecified type     Hypotension due to hypovolemia     Circadian rhythm  sleep disorder, delayed sleep phase type     Obstructive sleep apnea (adult) (pediatric)     Pulsatile tinnitus, left ear     Sleep disturbance     Bipolar disorder, current episode manic severe with psychotic features (H)     Hallucinations     Paranoia (H)     Renal calculus, left       Family History       family history includes Alzheimer Disease in her sister; Aortic aneurysm in her mother; Cerebrovascular Disease in her father; Dementia in her maternal grandmother, mother, and sister; Heart Disease in her father and mother; Kidney Disease in her father and mother; Other - See Comments in her brother; Sleep Apnea in her sister.    Social History      reports that she quit smoking about 23 years ago. Her smoking use included cigarettes. She has a 20.00 pack-year smoking history. She has been exposed to tobacco smoke. She has never used smokeless tobacco. She reports that she does not drink alcohol and does not use drugs.      Review of Systems     Patient has no polyuria or polydipsia, no chest pain, dyspnea or TIA's, no numbness, tingling or pain in extremities  Remainder negative except as noted in HPI.    Vital Signs     LMP  (LMP Unknown)     Physical Exam     Constitutional:  Well developed, Well nourished  HENT:  Normocephalic,   Neck: normal in appearance,   Eyes:  PERRL, Conjunctiva pink  Respiratory:  No respiratory distress  Skin: No acanthosis nigricans, lipoatrophy or lipodystrophy  Neurologic:  Alert & oriented x 3, nonfocal  Psychiatric:  Affect, Mood, Insight appropriate      Assessment     1. Age-related osteoporosis without current pathological fracture        Plan     Sandy will continue to take Prolia injections. She will also call and schedule her next Dexa Scan. I will see her next year.      Caroline Sumner NP   Endocrinology  12/8/2023  3:34 PM    Current Medications     Outpatient Medications Prior to Visit   Medication Sig Dispense Refill     acetaminophen (TYLENOL) 500 MG tablet Take  1,000 mg by mouth 3 times daily       albuterol (PROAIR HFA/PROVENTIL HFA/VENTOLIN HFA) 108 (90 Base) MCG/ACT inhaler Inhale 2 puffs into the lungs every 6 hours 18 g 4     allopurinol (ZYLOPRIM) 100 MG tablet Take 1 tablet (100 mg) by mouth 2 times daily 60 tablet 3     apixaban ANTICOAGULANT (ELIQUIS) 5 MG tablet Take 1 tablet (5 mg) by mouth 2 times daily 180 tablet 4     azelastine (ASTELIN) 0.1 % nasal spray 2 sprays each nostril 1-2x daily as needed for nasal congestion (use nightly for first 2 week) 30 mL 11     carvedilol (COREG) 3.125 MG tablet Take 1 tablet (3.125 mg) by mouth 2 times daily Hold for blood pressure less than 110 180 tablet 3     Cholecalciferol (VITAMIN D-3) 125 MCG (5000 UT) TABS Take 5,000 Units by mouth daily       divalproex sodium delayed-release (DEPAKOTE) 500 MG DR tablet Take 1 tablet (500 mg) by mouth 2 times daily 56 tablet 1     doxepin (SILENOR) 3 MG tablet Take 1 tablet (3 mg) by mouth nightly as needed for sleep 10 tablet 1     ipratropium - albuterol 0.5 mg/2.5 mg/3 mL (DUONEB) 0.5-2.5 (3) MG/3ML neb solution Take 1 vial by nebulization every 6 hours as needed for shortness of breath, wheezing or cough       levETIRAcetam (KEPPRA) 250 MG tablet Take 1 tablet (250 mg) by mouth 4 times daily 112 tablet 1     levothyroxine (SYNTHROID/LEVOTHROID) 50 MCG tablet Take 1 tablet (50 mcg) by mouth daily 90 tablet 3     Lidocaine (LIDOCARE) 4 % Patch Place 2 patches onto the skin every 24 hours To prevent lidocaine toxicity, patient should be patch free for 12 hrs daily. 60 patch 5     methocarbamol (ROBAXIN) 500 MG tablet Take 1 tablet (500 mg) by mouth 2 times daily 60 tablet 1     OLANZapine (ZYPREXA) 15 MG tablet Take 1 tablet (15 mg) by mouth At Bedtime 28 tablet 3     RESTASIS 0.05 % ophthalmic emulsion PLACE 1 DROP INTO BOTH EYES TWO TIMES A DAY       rosuvastatin (CRESTOR) 10 MG tablet Take 1 tablet (10 mg) by mouth daily 30 tablet 5     torsemide (DEMADEX) 20 MG tablet Take 1  tablet (20 mg) by mouth daily 30 tablet 5     Turmeric 500 MG CAPS Take 1,000 mg by mouth       valACYclovir (VALTREX) 500 MG tablet Take 1 tablet (500 mg) by mouth daily 90 tablet 3     venlafaxine (EFFEXOR XR) 75 MG 24 hr capsule Take 1 capsule (75 mg) by mouth daily 28 capsule 1     tiZANidine (ZANAFLEX) 4 MG tablet One tab bedtime, and may repeat after 4 hours (Patient not taking: Reported on 12/8/2023) 60 tablet 3     Facility-Administered Medications Prior to Visit   Medication Dose Route Frequency Provider Last Rate Last Admin     denosumab (PROLIA) injection 60 mg  60 mg Subcutaneous Q6 Months Caroline Sumner NP   60 mg at 08/03/23 1238         Lab Results     TSH   Date Value Ref Range Status   06/14/2023 1.19 0.30 - 4.20 uIU/mL Final   06/14/2022 0.60 0.30 - 5.00 uIU/mL Final     Phosphorus   Date Value Ref Range Status   02/09/2023 3.2 2.5 - 4.5 mg/dL Final     Iron   Date Value Ref Range Status   02/09/2023 65 37 - 145 ug/dL Final           Imaging Results   Last DEXA scan:  No valid procedures specified.    This result has an attachment that is not available.   3/9/2021       RE: Sandy Spencer   YOB: 1940         Dear Caroline Sumner,     Patient Profile:   80 y.o. female, postmenopausal, is here for the first bone density test.   History of fractures - None. Family history of osteoporosis - None.    Family history of hip fracture: None. Smoking history - Past. Osteoporosis   treatment past -  Yes;  Bisphosphonates. Osteoporosis treatment current -   No.  Chronic medical problems - Chronic low back problems, History of   kidney stones and Spine surgery. High risk medications -  Blood thinner   (Coumadin or Heparin);  Yes, Currently and Thyroid;  Yes, Currently.       Assessment:     1. The spine bone density L1-L4 with T-score 2.0 and significant   artifacts.   2. Femoral bone densities show left femoral neck T- score -1.8 and right   femoral neck T-score -1.5.   3. Trabecular bone score  indicates good trabecular bone architecture.   4. Left forearm bone density with T-score -2.3.     80 y.o. female with LOW BONE DENSITY (OSTEOPENIA) and HIGH fracture risk,   adjusted for the TBS, with major osteoporotic fracture risk 12.9% and hip   fracture risk 3.9%.     Previous scan was done on a different machine and is not directly   comparable with the current study.     Recommendations:   Appropriate evaluation and treatment recommended with follow up bone   density scan after 1 year of active treatment.       Bone densitometry was performed on your patient using our Silentium   densitometer. The results are summarized and a copy of the actual scans   are included for your review. In conformity with the International Society   of Clinical Densitometry's most recent position statement for DXA   interpretation (2015), the diagnosis will be made on the lowest measured   T-score of the lumbar spine, femoral neck, total proximal femur or 33%   radius. Note the change in terminology for diagnostic classification from   OSTEOPENIA to LOW BONE MASS. All trending for sequential exams will be   done using multiple vertebrae or the total proximal femur. Fracture risk   is based on the WHO Fracture Risk Assessment Tool (FRAX). If additional   information is needed or if you would like to discuss the results, please   do not hesitate to call me.       Thank you for referring this patient to [facility]   [facility]. We are happy to be of service in support of you and your   practice. If you have any questions or suggestions to improve our service,   please call me at [phone].     Sincerely,     Marisa Thakkar M.D. LUKE.   [facility]     Virtual Visit Details    Type of service:  Video Visit   Video Start Time: 1530    Video End Time: 1550    Originating Location (pt. Location): Home    Distant Location (provider location):  On-site  Platform used for Video Visit:  Rogelio      Again, thank you for allowing me to participate in the care of your patient.        Sincerely,        Caroline Sumner NP

## 2023-12-11 NOTE — TELEPHONE ENCOUNTER
General Call    Contacts         Type Contact Phone/Fax    12/01/2023 12:30 PM CST Phone (Incoming) Sandy Spencer (Self) [phone] (M)          Reason for Call:  follow up    What are your questions or concerns:  patient's daughterKhai (on consent to communicate) calling for a follow up on the hip injections that Dr. Rees ordered, please follow up with daughter to move forward to get injection    Khai is also asking if Dr. Rees could take over prescribing Levetiracetam/Keppra 250mg and Allopurinol 100mg (for gout) which was prescribed by Dr. Allison (suppose to be Pain pysch, which patient and daughter would like to discontinue seeing)     Daughter also states that patient has been having a lot of mental health issues and home issues. States that patient has been difficult with receiving care. Please advise and call Khai sage    Date of last appointment with provider: 11/15/23    Could we send this information to you in [place] or would you prefer to receive a phone call?:   Patient would prefer a phone call   Okay to leave a detailed message?: Yes at Other phone number:  [phone] (khai daughter)   Patient needs her prescription refilled for levothyrozine.   112 mcg    BERTIN Gee

## 2023-12-15 ENCOUNTER — PATIENT OUTREACH (OUTPATIENT)
Dept: CARE COORDINATION | Facility: CLINIC | Age: 83
End: 2023-12-15
Payer: MEDICARE

## 2023-12-15 NOTE — PROGRESS NOTES
Clinic Care Coordination Contact  Follow Up Progress Note      Assessment: Talked to Viola who is having knee replacement surgery on 12-19.  The rest of her family will be helping patient during her recuperation period. She offered to get her a life line and she defers.  Family is trying to get her to agree to move to assisted living without success at this time. Patient is paying her grand daughter and her  to help with technology and with some cleaning.      They are staying with psychiatrist at [facility] and have upcoming appt.  She is taking Haldol and tizanidine now and is more sleepy.     She would like to get her to stop driving and may be willing to contact department of public safety.  Emailed a link for a report she can make to share her concerns.  She will review and submit when ready.  She wants to continue to monitor her through the spring and then hopefully put her house up for sale and then she will have to move.  Her kidneys continue to get worse and Viola thinks she may not have a long life because of that.      Care Gaps:    Health Maintenance Due   Topic Date Due    COPD ACTION PLAN  Never done    COVID-19 Vaccine (1) Never done    RSV VACCINE (Pregnancy & 60+) (1 - 1-dose 60+ series) Never done    MEDICARE ANNUAL WELLNESS VISIT  Never done    DEPRESSION 6 MO INDEX REPEAT PHQ-9  11/29/2023       Postponed to next pcp appt.     Care Plans  Care Plan: Medication Regimen       Problem: Medication Adherence       Long-Range Goal: I will take my medications as prescribed.       Start Date: 8/1/2023 Expected End Date: 7/31/2024    This Visit's Progress: 30% Recent Progress: 30%    Priority: High    Note:     Barriers: doesn't always take her medications as prescribed.   Strengths: daughter can assist.   Patient expressed understanding of goal: daughter does  Action steps to achieve this goal:  1. Daughter will work with MTM to understand current medication regime.  2. Daughter will work with  PCP and pain clinic to see if a psychiatrist is needed.    3. Daughter will report progress towards this goal at scheduled outreach telephone calls from the [facility] team.                               Care Plan: Help At Home       Problem: Insufficient In-home support       Long-Range Goal: I have enough support at home to live independently.       Start Date: 8/1/2023 Expected End Date: 7/31/2024    This Visit's Progress: 50% Recent Progress: 40%    Priority: High    Note:     Barriers: patient may not accept.   Strengths: daughter is supportive  Patient expressed understanding of goal: daughter does  Action steps to achieve this goal:  1. Daughter will monitor patient's needs.  2. Daughter will work with care team to get needed services.   3. Daughter will report progress towards this goal at scheduled outreach telephone calls from the [facility] team.  8-9 has a waiver, gets meals                                Intervention/Education provided during outreach: support, help with reporting an unsafe .      Outreach Frequency: monthly, more frequently as needed  Plan:     Care Coordinator will follow up in 30 days and as needed.   Rochelle Weber,   [facility]  [phone]

## 2024-01-04 ENCOUNTER — PATIENT OUTREACH (OUTPATIENT)
Dept: CARE COORDINATION | Facility: CLINIC | Age: 84
End: 2024-01-04
Payer: MEDICARE

## 2024-01-04 DIAGNOSIS — I25.119 CORONARY ARTERY DISEASE INVOLVING NATIVE CORONARY ARTERY OF NATIVE HEART WITH ANGINA PECTORIS (H): ICD-10-CM

## 2024-01-04 DIAGNOSIS — G89.4 CHRONIC PAIN SYNDROME: ICD-10-CM

## 2024-01-04 RX ORDER — METHOCARBAMOL 500 MG/1
500 TABLET, FILM COATED ORAL 2 TIMES DAILY
Qty: 60 TABLET | Refills: 1 | Status: SHIPPED | OUTPATIENT
Start: 2024-01-04 | End: 2024-02-05

## 2024-01-04 NOTE — TELEPHONE ENCOUNTER
Refill Request  Medication name: Pending Prescriptions:                       Disp   Refills    methocarbamol (ROBAXIN) 500 MG tablet     60 tab*1            Sig: Take 1 tablet (500 mg) by mouth 2 times daily    Requested Pharmacy:  [facility] [place] [address]

## 2024-01-04 NOTE — TELEPHONE ENCOUNTER
Refill Request  Medication name: Pending Prescriptions:                       Disp   Refills    rosuvastatin (CRESTOR) 10 MG tablet       30 tab*5            Sig: Take 1 tablet (10 mg) by mouth daily    Requested Pharmacy:  [facility], [address] [place] [address]

## 2024-01-04 NOTE — PROGRESS NOTES
Clinic Care Coordination Contact  Follow Up Progress Note      Assessment: Call from daughter. Patient is falling at home.  She falls out of bed onto the floor and then gets up.  She also fell in the bathtub.  She is refusing to get a life alert.  Family is concerned.  Pema and her  and her sister will be meeting to discuss next steps.  She will not sell her car.  She is not taking her meds correctly.  She doesn't want to move.  They may have to take actions that she doesn't agree with.  She has psych appt on 1-8.      Care Gaps:    Health Maintenance Due   Topic Date Due    COPD ACTION PLAN  Never done    COVID-19 Vaccine (1) Never done    RSV VACCINE (Pregnancy & 60+) (1 - 1-dose 60+ series) Never done    MEDICARE ANNUAL WELLNESS VISIT  Never done       Postponed to next pcp appt.     Care Plans  Care Plan: Medication Regimen       Problem: Medication Adherence       Long-Range Goal: I will take my medications as prescribed.       Start Date: 8/1/2023 Expected End Date: 7/31/2024    This Visit's Progress: 30% Recent Progress: 30%    Priority: High    Note:     Barriers: doesn't always take her medications as prescribed.   Strengths: daughter can assist.   Patient expressed understanding of goal: daughter does  Action steps to achieve this goal:  1. Daughter will work with MTM to understand current medication regime.  2. Daughter will work with PCP and pain clinic to see if a psychiatrist is needed.    3. Daughter will report progress towards this goal at scheduled outreach telephone calls from the [facility] team.                               Care Plan: Help At Home       Problem: Insufficient In-home support       Long-Range Goal: I have enough support at home to live independently.       Start Date: 8/1/2023 Expected End Date: 7/31/2024    This Visit's Progress: 50% Recent Progress: 50%    Priority: High    Note:     Barriers: patient may not accept.   Strengths: daughter is supportive  Patient expressed  understanding of goal: daughter does  Action steps to achieve this goal:  1. Daughter will monitor patient's needs.  2. Daughter will work with care team to get needed services.   3. Daughter will report progress towards this goal at scheduled outreach telephone calls from the [facility] team.  8-9 has a waiver, gets meals                                Intervention/Education provided during outreach: support.     Outreach Frequency: monthly, more frequently as needed    Plan:     Care Coordinator will follow up in 30 days.   Rochelle Weber,   [facility]  [phone]

## 2024-01-05 RX ORDER — ROSUVASTATIN CALCIUM 10 MG/1
10 TABLET, COATED ORAL DAILY
Qty: 90 TABLET | Refills: 1 | Status: SHIPPED | OUTPATIENT
Start: 2024-01-05

## 2024-01-09 ENCOUNTER — TELEPHONE (OUTPATIENT)
Dept: FAMILY MEDICINE | Facility: CLINIC | Age: 84
End: 2024-01-09
Payer: MEDICARE

## 2024-01-09 NOTE — LETTER
January 22, 2024      Sandy Spencer  [address]  [facility] [address]        To Whom It May Concern,     Sandy has been demonstrating some decisions that can risk her safety. She has been declining measures to keep her safe at home including having a life alert button. She does also have frequent mood swings indicating that she may have poorly controlled mental health as well.     Lastly, there is concern that she may have impaired memory, although formal testing has not been done at this time.     In addition to this          Sincerely,        Caitlyn Rees MD

## 2024-01-10 ENCOUNTER — PATIENT OUTREACH (OUTPATIENT)
Dept: CARE COORDINATION | Facility: CLINIC | Age: 84
End: 2024-01-10
Payer: MEDICARE

## 2024-01-10 NOTE — PROGRESS NOTES
Clinic Care Coordination Contact  Follow Up Progress Note      Assessment: Call from daughter Viola.  She has sent a request to pcp to get a letter from PCP written that indicates that patient is not able to care for herself.  She wants to have it documented so she can get her mom moved.  Viola, and her  and sister are planning to meet with patient on 1-14 to discuss her needs. Viola thinks she is back in a manic stage.  She is making demands and making threats to cut Viola off and rely on her friends.  She is suspicious of Viola's intentions.  Patient has been exposed to Covid so are having the meeting in a few days.  She had recent appt with her psychiatrist who increased her Depakote, took her off Zyprexa, and increased her Haldol.  Discussed her behaviors which Viola think are almost as bad as they were when she went into [employer].  Reviewed that she may need to return to the hospital, but Viola is unsure how they would get her to go. Last time, they used her UTI as the reason to go into hospital.  Gave her the phone number for [facility] crisis team if she needs in home assessment.  Viola will review options with her family.      Viola is trying to find counseling for herself, but has been waiting for 4-5 months.  Gave her several options to call for more immediate counseling.  She is also considering attending [facility] meetings as her family has a lot of addiction issues.  She will call as needed.      Care Gaps:    Health Maintenance Due   Topic Date Due    COPD ACTION PLAN  Never done    COVID-19 Vaccine (1) Never done    RSV VACCINE (Pregnancy & 60+) (1 - 1-dose 60+ series) Never done    MEDICARE ANNUAL WELLNESS VISIT  Never done       Postponed to next pcp appt     Care Plans  Care Plan: Medication Regimen       Problem: Medication Adherence       Long-Range Goal: I will take my medications as prescribed.       Start Date: 8/1/2023 Expected End Date: 7/31/2024    This Visit's  Progress: 30% Recent Progress: 30%    Priority: High    Note:     Barriers: doesn't always take her medications as prescribed.   Strengths: daughter can assist.   Patient expressed understanding of goal: daughter does  Action steps to achieve this goal:  1. Daughter will work with MTM to understand current medication regime.  2. Daughter will work with PCP and pain clinic to see if a psychiatrist is needed.    3. Daughter will report progress towards this goal at scheduled outreach telephone calls from the [facility] team.                               Care Plan: Help At Home       Problem: Insufficient In-home support       Long-Range Goal: I have enough support at home to live independently.       Start Date: 8/1/2023 Expected End Date: 7/31/2024    This Visit's Progress: 50% Recent Progress: 50%    Priority: High    Note:     Barriers: patient may not accept.   Strengths: daughter is supportive  Patient expressed understanding of goal: daughter does  Action steps to achieve this goal:  1. Daughter will monitor patient's needs.  2. Daughter will work with care team to get needed services.   3. Daughter will report progress towards this goal at scheduled outreach telephone calls from the [facility] team.  8-9 has a waiver, gets meals                                Intervention/Education provided during outreach: support.     Outreach Frequency: monthly, more frequently as needed    Plan:     Care Coordinator will follow up in 30 days and as needed.  Rochelle Weber,   [facility]  [phone]

## 2024-01-11 ENCOUNTER — TRANSFERRED RECORDS (OUTPATIENT)
Dept: HEALTH INFORMATION MANAGEMENT | Facility: CLINIC | Age: 84
End: 2024-01-11
Payer: MEDICARE

## 2024-01-11 DIAGNOSIS — M54.81 BILATERAL OCCIPITAL NEURALGIA: ICD-10-CM

## 2024-01-11 NOTE — TELEPHONE ENCOUNTER
Refill Request  Medication name: Pending Prescriptions:                       Disp   Refills    levETIRAcetam (KEPPRA) 250 MG tablet      112 ta*1            Sig: Take 1 tablet (250 mg) by mouth 4 times daily    Requested Pharmacy:  [facility] [place] [address]

## 2024-01-12 RX ORDER — LEVETIRACETAM 250 MG/1
250 TABLET ORAL 4 TIMES DAILY
Qty: 112 TABLET | Refills: 1 | Status: ON HOLD | OUTPATIENT
Start: 2024-01-12 | End: 2024-04-24

## 2024-01-17 DIAGNOSIS — Z92.29 PERSONAL HISTORY OF OTHER DRUG THERAPY: ICD-10-CM

## 2024-01-17 DIAGNOSIS — M81.0 AGE-RELATED OSTEOPOROSIS WITHOUT CURRENT PATHOLOGICAL FRACTURE: Primary | ICD-10-CM

## 2024-01-19 NOTE — TELEPHONE ENCOUNTER
Patient's daughter is calling and checking for the status on a letter  for her mother to get in to assistant living. Daughter has not heard back from the provider about this situation.     Patient is not safe and daughter feels she could get better care if she was in a facility that could care for her so patient is safe.      Daughter also has concerns about her mental health. Please advise and call Daughter back updates please and thank you.

## 2024-01-22 NOTE — TELEPHONE ENCOUNTER
Letter is printed.     Unfortunately, without being seen my information is limited to what her and rita talked about.     I also tried to be vague, so that assisted living facilities would not refuse her. I also worry about that with her escalating behaviors.

## 2024-01-25 ENCOUNTER — PATIENT OUTREACH (OUTPATIENT)
Dept: CARE COORDINATION | Facility: CLINIC | Age: 84
End: 2024-01-25
Payer: MEDICARE

## 2024-01-25 NOTE — PROGRESS NOTES
Clinic Care Coordination Contact  Follow Up Progress Note     3 PM on 1-26.. VM from daughter.  Her mom's friend called to say that patient in on the floor of her house and is unable to walk.   Viola is on her way over to see her and decide what to do.  She asked for a call back. She talked to Jan Major today and mom maybe needing more care than senior living can provide and asked for help from PCP to get that set up.      Called Viola and left her VM encouraging her to call 911 to have paramedics take her to hospital and then work with hospital to get her discharged to TCU as she is unable to care for herself at home.  From there, a plan can be created to find the appropriate level of care.    Plan- assist as needed.  Will do chart review on 1-29.      1-  Call from Viola.  Patient went to ED yesterday after being on the floor when her son in law called her. Viola called to get welfare check and patiient refused to go to ED.  Viola talked to her and got her to agree that she needs evaluation.  She went to [facility] ED and has a UTI.  Patient got medication and Viola's sister came to pick her up.  Patient fell upon leaving the ED.      Patient returned home. She thinks she has Parkinson's disease and wants to see neurologist.  Viola doesn't think she has Parkinson's.  She is having more and more falls.      Viola will contact her harmony AL[facility] at [facility] today.  She will be working with [facility] to see how they may be able to get her into senior living. She may have to go after her next hospitalization directly to [facility]. Or perhaps after a PCP appt.  Patient seems to listen to PCP.      Patient mentioned that she may want to go to [facility] where she used to live.  A friend may try to help her with remembering that option.  She did not like the apartments there.  Viola realizes that she will not be happy anywhere.  Viola and her  are very tired of caregiving for her.  She talks about taking her car  out, but she is using her scooter and cannot get into the car as there is not enough room in the garage to get the scooter to the car door.      Plan- Viola to proceed with working with [facility] and [facility].  Writer available as needed and  in one month.        Assessment: Call from daughter Viola. They have been in touch with [facility] and they may have opening for assisted living soon.  She is second on the waiting list.  They need to put down a deposit of $2,000.  Mom is not doing well.  Continues to disagree with Pema's suggestions. Not taking her medications because she doesn't want to.  Viola has gotten letter from PCP and psychiatrist regarding patient's lack of ability to live independently.  She was slurring her words and having trouble walking at a recent visit.  Patient is refusing to move to assisted living.  Viola has talked to two attorneys about getting guardianship of mom and they have advised her that she will be awarded guardianship based on what she has told them.  She can get an emergency guardianship that would last 6 months.  Viola does not want to be guardian and is aware that they could hire a guardian.  She wishes that there was another way to get mom to move as she knows the court appearance will be hard on her.    Viola is trying to collect medical records for [facility] as they want to review before accepting her.      Mom is being monitored closely.  Suggested that Viola contact her [facility] to let her know about the guardianship proceedings.  The [facility] may be helpful if they also see the need for a guardian.      Care Gaps:    Health Maintenance Due   Topic Date Due    COPD ACTION PLAN  Never done    COVID-19 Vaccine (1) Never done    RSV VACCINE (Pregnancy & 60+) (1 - 1-dose 60+ series) Never done    MEDICARE ANNUAL WELLNESS VISIT  Never done       Postponed to next appt.      Care Plans  Care Plan: Medication Regimen       Problem: Medication Adherence        Long-Range Goal: I will take my medications as prescribed.       Start Date: 8/1/2023 Expected End Date: 7/31/2024    This Visit's Progress: 30% Recent Progress: 30%    Priority: High    Note:     Barriers: doesn't always take her medications as prescribed.   Strengths: daughter can assist.   Patient expressed understanding of goal: daughter does  Action steps to achieve this goal:  1. Daughter will work with MTM to understand current medication regime.  2. Daughter will work with PCP and pain clinic to see if a psychiatrist is needed.    3. Daughter will report progress towards this goal at scheduled outreach telephone calls from the [facility] team.                               Care Plan: Help At Home       Problem: Insufficient In-home support       Long-Range Goal: I have enough support at home to live independently.       Start Date: 8/1/2023 Expected End Date: 7/31/2024    This Visit's Progress: 50% Recent Progress: 50%    Priority: High    Note:     Barriers: patient may not accept.   Strengths: daughter is supportive  Patient expressed understanding of goal: daughter does  Action steps to achieve this goal:  1. Daughter will monitor patient's needs.  2. Daughter will work with care team to get needed services.   3. Daughter will report progress towards this goal at scheduled outreach telephone calls from the [facility] team.  8-9 has a waiver, gets meals                                Intervention/Education provided during outreach: support     Outreach Frequency: monthly, more frequently as needed    Plan:     Care Coordinator will follow up in one month and as needed.  Rochelle Weber,   [facility]  [phone]

## 2024-01-25 NOTE — LETTER
[facility] CARE COORDINATION  [facility]    January 25, 2024        Sandy Spencer  [address]  [place] [address]          Dear Sandy,     Attached is an updated Complex Care Plan for your continued enrollment in Care Coordination. Please let us know if you have additional questions, concerns, or goals that we can assist with.    Sincerely,    Rochelle Weber,   [facility]  [phone]        [facility]  Patient Centered Plan of Care  About Me:        Patient Name:  Sandy Spencer    YOB: 1940  Age:         83 year old   [place] MRN:    [id number] Telephone Information:  Home Phone [phone]   Mobile [phone]       Address:  [address]  [place] [address] Email address:  [email or web address]      Emergency Contact(s)    Name Relationship Lgl Grd Work Phone Home Phone Mobile Phone   1. JAEARIANA Friend    [phone]   2. TREMAINEEUFEMIA* Daughter No  [phone]   3. MARTIN ZENG* Daughter   [phone]    4. BESSIE MOYA Other No  [phone]           Primary language:  English     needed? No   [place] [employer] [facility]:  [phone] op. 1  Other communication barriers:Caregiver    Preferred Method of Communication:     Current living arrangement: I live in a private home    Mobility Status/ Medical Equipment: Independent        Health Maintenance  Health Maintenance Reviewed: Due/Overdue   Health Maintenance Due   Topic Date Due    COPD ACTION PLAN  Never done    COVID-19 Vaccine (1) Never done    RSV VACCINE (Pregnancy & 60+) (1 - 1-dose 60+ series) Never done    MEDICARE ANNUAL WELLNESS VISIT  Never done           My Access Plan  Medical Emergency 911   Primary Clinic Line [facility] - [phone]   24 Hour Appointment Line [phone] or  [phone]) (toll-free)   24 Hour Nurse Line [phone] (toll-free)    [facility] [phone]     Preferred Hospital [facility]  [phone]     Preferred Pharmacy [address] [place] [address]     Behavioral Health Crisis Line The National Suicide Prevention Lifeline at 1-724.119.9369 or Text/Call 988           My Care Team Members  Patient Care Team         Relationship Specialty Notifications Start End    Caitlyn Rees MD PCP - General   7/29/15     Phone: [phone] Fax: [phone] [address] [facility] [address]    Caitlyn Rees MD Assigned PCP   6/16/21     Phone: [phone] Fax: [phone] [address] [facility] [address]    Luz Elena Ybarra MD Assigned Heart and Vascular Provider   7/16/21     Phone: [phone] Fax: [phone]         [facility] [facility] [address]    Magali Garrison, MARIZA Speech Language Pathologist Speech Language/Path  6/27/22     Phone: [phone]          [address] [facility] [address]    Christine Bennett PABenjaminC Referring Physician Neurology  6/27/22     [facility] referral.    Phone: [phone] Fax: [phone]         [address] [facility] [address]    Rigoberto Castillo MD Assigned Surgical Provider   7/23/22     Phone: [phone] Fax: [phone]         [facility] \[facility]\""[address]  [facility] [address]    Luz Elena Ybarra MD MD Cardiovascular Disease  2/21/23     Phone: [phone] Fax: [phone]         [address] [facility] [address]    Rudolph Gamez PsyD Assigned Sleep Provider   3/4/23     Phone: [phone] Fax: [phone] [address] [facility] [address]    Zamzam Hercules MD Assigned Pulmonology Provider   5/6/23      [address] [facility] [address]    Darlin Ballard, PharmD Assigned MTM Pharmacist   5/27/23     Phone: [phone] Fax: [phone]         [address] [place] [address]    Gus,  SARTHAK Byrd Lead Care Coordinator Primary Care - CC Admissions 7/19/23     Phone: [phone]         Christine Bennett PA-C Assigned Neuroscience Provider   9/9/23     Phone: [phone] Fax: [phone] [address] [facility] [address]                My Care Plans  Self Management and Treatment Plan    Care Plan  Care Plan: Medication Regimen       Problem: Medication Adherence       Long-Range Goal: I will take my medications as prescribed.       Start Date: 8/1/2023 Expected End Date: 7/31/2024    This Visit's Progress: 30% Recent Progress: 30%    Priority: High    Note:     Barriers: doesn't always take her medications as prescribed.   Strengths: daughter can assist.   Patient expressed understanding of goal: daughter does  Action steps to achieve this goal:  1. Daughter will work with MTM to understand current medication regime.  2. Daughter will work with PCP and pain clinic to see if a psychiatrist is needed.    3. Daughter will report progress towards this goal at scheduled outreach telephone calls from the [facility] team.                               Care Plan: Help At Home       Problem: Insufficient In-home support       Long-Range Goal: I have enough support at home to live independently.       Start Date: 8/1/2023 Expected End Date: 7/31/2024    This Visit's Progress: 50% Recent Progress: 50%    Priority: High    Note:     Barriers: patient may not accept.   Strengths: daughter is supportive  Patient expressed understanding of goal: daughter does  Action steps to achieve this goal:  1. Daughter will monitor patient's needs.  2. Daughter will work with care team to get needed services.   3. Daughter will report progress towards this goal at scheduled outreach telephone calls from the [facility] team.  8-9 has a waiver, gets meals                                Advance Care Plans/Directives:   Advanced Care Plan/Directives on file:   Yes    Status of Document(s): No data recorded  Advanced Care  Plan/Directives Type:   Advanced Directive - On File           My Medical and Care Information  Problem List   Patient Active Problem List   Diagnosis    Focal glomerular sclerosis    Complex regional pain syndrome type 1 of both lower extremities    ADD (attention deficit disorder)    RSD upper limb, right    Major depression    HTN (hypertension)    Insomnia    Pure hypercholesterolemia    Right rotator cuff tear    Cluster headaches    Lumbar radiculopathy    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    Temporomandibular joint-pain-dysfunction syndrome (TMJ)    Hypothyroidism    Chronic pain    Snoring    Generalized abdominal pain    Bilateral carotid artery stenosis    Coronary artery disease involving native coronary artery of native heart with angina pectoris (H24)    Other chronic pulmonary embolism without acute cor pulmonale (H)    Hematuria    Hydronephrosis    Bladder spasms    Anxiety disorder due to medical condition    Family relationship problem    History of posttraumatic stress disorder (PTSD)    Generalized muscle weakness    Myofascial pain    Moderate major depression (H)    Elevated lipase    Abdominal bloating    Ampullary stenosis (H28)    Obstruction of biliary tree (H28)    Anemia    Aphthous ulcer of ileum    Disorder of phosphorus metabolism    Edema    Obstruction of common bile duct (H28)    Overflow diarrhea    History of partial colectomy    Reflex sympathetic dystrophy    Solitary left kidney    Flank pain    Hypocitraturia    Primary osteoarthritis of both knees    Iron deficiency anemia    Proteinuria    Concussion with loss of consciousness    Muscle weakness (generalized)    Shuffling gait    Falls frequently    Long term current use of anticoagulant therapy    COPD (chronic obstructive pulmonary disease) (H)    CKD (chronic kidney disease) stage 4, GFR 15-29 ml/min (H)    Cervical disc disorder with radiculopathy    Derangement of meniscus    Intractable chronic migraine  without aura    Occipital neuralgia    Post-traumatic headache    S/p nephrectomy    Sciatic neuropathy    Pain in right knee    Leg pain, bilateral    Cervical radiculopathy    Spinal stenosis of cervical region    Fibrositis of neck    Hypokalemia    Diarrhea, unspecified type    Hypotension due to hypovolemia    Circadian rhythm sleep disorder, delayed sleep phase type    Obstructive sleep apnea (adult) (pediatric)    Pulsatile tinnitus, left ear    Sleep disturbance    Bipolar disorder, current episode manic severe with psychotic features (H)    Hallucinations    Paranoia (H)    Renal calculus, left      Current Medications and Allergies:    Current Outpatient Medications   Medication Instructions    acetaminophen (TYLENOL) 1,000 mg, Oral, 3 TIMES DAILY    albuterol (PROAIR HFA/PROVENTIL HFA/VENTOLIN HFA) 108 (90 Base) MCG/ACT inhaler 2 puffs, Inhalation, EVERY 6 HOURS    allopurinol (ZYLOPRIM) 100 mg, Oral, 2 TIMES DAILY    apixaban ANTICOAGULANT (ELIQUIS) 5 mg, Oral, 2 TIMES DAILY    azelastine (ASTELIN) 0.1 % nasal spray 2 sprays each nostril 1-2x daily as needed for nasal congestion (use nightly for first 2 week)    carvedilol (COREG) 3.125 mg, Oral, 2 TIMES DAILY, Hold for blood pressure less than 110    divalproex sodium delayed-release (DEPAKOTE) 500 mg, Oral, 2 TIMES DAILY    doxepin (SILENOR) 3 mg, Oral, AT BEDTIME PRN    ipratropium - albuterol 0.5 mg/2.5 mg/3 mL (DUONEB) 0.5-2.5 (3) MG/3ML neb solution 1 vial, Nebulization, EVERY 6 HOURS PRN    levETIRAcetam (KEPPRA) 250 mg, Oral, 4 TIMES DAILY    levothyroxine (SYNTHROID/LEVOTHROID) 50 mcg, Oral, DAILY    Lidocaine (LIDOCARE) 4 % Patch 2 patches, Transdermal, EVERY 24 HOURS, To prevent lidocaine toxicity, patient should be patch free for 12 hrs daily.    methocarbamol (ROBAXIN) 500 mg, Oral, 2 TIMES DAILY    OLANZapine (ZYPREXA) 15 mg, Oral, AT BEDTIME    RESTASIS 0.05 % ophthalmic emulsion PLACE 1 DROP INTO BOTH EYES TWO TIMES A DAY    rosuvastatin  (CRESTOR) 10 mg, Oral, DAILY    torsemide (DEMADEX) 20 mg, Oral, DAILY    Turmeric 1,000 mg, Oral    valACYclovir (VALTREX) 500 mg, Oral, DAILY    venlafaxine (EFFEXOR XR) 75 mg, Oral, DAILY    Vitamin D-3 5,000 Units, Oral, DAILY         Care Coordination Start Date: 7/18/2023   Frequency of Care Coordination: monthly, more frequently as needed     Form Last Updated: 01/25/2024

## 2024-01-26 ENCOUNTER — TELEPHONE (OUTPATIENT)
Dept: FAMILY MEDICINE | Facility: CLINIC | Age: 84
End: 2024-01-26
Payer: MEDICARE

## 2024-01-26 NOTE — TELEPHONE ENCOUNTER
Reason for Call:  Appointment Request    Patient requesting this type of appt:  Hospital/ED Follow-Up     Requested provider: Caitlyn Rees    Reason patient unable to be scheduled: Not within requested timeframe    When does patient want to be seen/preferred time: 3-7 days    Comments: patient's daughter, Viola (on consent to communicate) requesting before Feb 5th, as Viola is the primary person who brings patient to appointments. Viola will be out of town and cannot make it anytime before the 5th or after the 16th    Could we send this information to you in [place] or would you prefer to receive a phone call?:   Patient would prefer a phone call   Okay to leave a detailed message?: Yes at [phone]    Call taken on 1/26/2024 at 10:19 AM by Mee Cunningham

## 2024-02-02 ENCOUNTER — DOCUMENTATION ONLY (OUTPATIENT)
Dept: GERIATRICS | Facility: CLINIC | Age: 84
End: 2024-02-02
Payer: MEDICARE

## 2024-02-03 ENCOUNTER — LAB REQUISITION (OUTPATIENT)
Dept: LAB | Facility: CLINIC | Age: 84
End: 2024-02-03
Payer: MEDICARE

## 2024-02-03 DIAGNOSIS — N17.9 ACUTE KIDNEY FAILURE, UNSPECIFIED (H): ICD-10-CM

## 2024-02-05 ENCOUNTER — TRANSITIONAL CARE UNIT VISIT (OUTPATIENT)
Dept: GERIATRICS | Facility: CLINIC | Age: 84
End: 2024-02-05
Payer: MEDICARE

## 2024-02-05 ENCOUNTER — TELEPHONE (OUTPATIENT)
Dept: GERIATRICS | Facility: CLINIC | Age: 84
End: 2024-02-05

## 2024-02-05 ENCOUNTER — PATIENT OUTREACH (OUTPATIENT)
Dept: CARE COORDINATION | Facility: CLINIC | Age: 84
End: 2024-02-05

## 2024-02-05 VITALS
DIASTOLIC BLOOD PRESSURE: 49 MMHG | SYSTOLIC BLOOD PRESSURE: 106 MMHG | BODY MASS INDEX: 33.84 KG/M2 | TEMPERATURE: 97.7 F | WEIGHT: 191 LBS | HEART RATE: 73 BPM | RESPIRATION RATE: 16 BRPM | HEIGHT: 63 IN | OXYGEN SATURATION: 94 %

## 2024-02-05 DIAGNOSIS — F31.2 BIPOLAR DISORDER, CURRENT EPISODE MANIC SEVERE WITH PSYCHOTIC FEATURES (H): ICD-10-CM

## 2024-02-05 DIAGNOSIS — R29.6 FALLS FREQUENTLY: ICD-10-CM

## 2024-02-05 DIAGNOSIS — J41.0 SIMPLE CHRONIC BRONCHITIS (H): ICD-10-CM

## 2024-02-05 DIAGNOSIS — I27.82 OTHER CHRONIC PULMONARY EMBOLISM WITHOUT ACUTE COR PULMONALE (H): ICD-10-CM

## 2024-02-05 DIAGNOSIS — I25.119 CORONARY ARTERY DISEASE INVOLVING NATIVE CORONARY ARTERY OF NATIVE HEART WITH ANGINA PECTORIS (H): ICD-10-CM

## 2024-02-05 DIAGNOSIS — O22.30 DVT (DEEP VEIN THROMBOSIS) IN PREGNANCY: ICD-10-CM

## 2024-02-05 DIAGNOSIS — M62.81 GENERALIZED MUSCLE WEAKNESS: Chronic | ICD-10-CM

## 2024-02-05 DIAGNOSIS — Z79.01 LONG TERM CURRENT USE OF ANTICOAGULANT THERAPY: ICD-10-CM

## 2024-02-05 DIAGNOSIS — M62.81 MUSCLE WEAKNESS (GENERALIZED): ICD-10-CM

## 2024-02-05 DIAGNOSIS — I10 PRIMARY HYPERTENSION: Primary | ICD-10-CM

## 2024-02-05 DIAGNOSIS — N18.4 CKD (CHRONIC KIDNEY DISEASE) STAGE 4, GFR 15-29 ML/MIN (H): ICD-10-CM

## 2024-02-05 PROBLEM — N30.00 ACUTE CYSTITIS WITHOUT HEMATURIA: Status: ACTIVE | Noted: 2024-01-27

## 2024-02-05 PROBLEM — E78.5 HYPERLIPIDEMIA: Status: ACTIVE | Noted: 2020-10-26

## 2024-02-05 LAB
ANION GAP SERPL CALCULATED.3IONS-SCNC: 12 MMOL/L (ref 7–15)
BUN SERPL-MCNC: 31.5 MG/DL (ref 8–23)
CALCIUM SERPL-MCNC: 8.9 MG/DL (ref 8.8–10.2)
CHLORIDE SERPL-SCNC: 100 MMOL/L (ref 98–107)
CREAT SERPL-MCNC: 1.74 MG/DL (ref 0.51–0.95)
DEPRECATED HCO3 PLAS-SCNC: 26 MMOL/L (ref 22–29)
EGFRCR SERPLBLD CKD-EPI 2021: 29 ML/MIN/1.73M2
GLUCOSE SERPL-MCNC: 110 MG/DL (ref 70–99)
POTASSIUM SERPL-SCNC: 3.9 MMOL/L (ref 3.4–5.3)
SODIUM SERPL-SCNC: 138 MMOL/L (ref 135–145)

## 2024-02-05 PROCEDURE — 36415 COLL VENOUS BLD VENIPUNCTURE: CPT | Performed by: FAMILY MEDICINE

## 2024-02-05 PROCEDURE — 80048 BASIC METABOLIC PNL TOTAL CA: CPT | Performed by: FAMILY MEDICINE

## 2024-02-05 PROCEDURE — 99305 1ST NF CARE MODERATE MDM 35: CPT | Performed by: FAMILY MEDICINE

## 2024-02-05 PROCEDURE — P9604 ONE-WAY ALLOW PRORATED TRIP: HCPCS | Performed by: FAMILY MEDICINE

## 2024-02-05 RX ORDER — AMOXICILLIN 500 MG/1
500 CAPSULE ORAL 3 TIMES DAILY
COMMUNITY
Start: 2024-02-02 | End: 2024-02-08

## 2024-02-05 NOTE — PROGRESS NOTES
Clinic Care Coordination Contact  Care Coordination Transition Communication    Clinical Data: Patient was hospitalized at [facility] from 1- to 2-2-2024 with diagnosis of weakness/falls.     Assessment: Patient has transitioned to TCU/ARU for short term rehabilitation:    Facility Name: [facility]  Transition Communication:  Notified facility of Primary Care- Care Coordination support via Epic fax.    Plan: Care Coordinator will await notification from facility staff informing of patient's discharge plans/needs. Care Coordinator will review chart and outreach to facility staff every 4 weeks and as needed.     Rochelle Weber,   [facility]  [phone]
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
## 2024-02-05 NOTE — TELEPHONE ENCOUNTER
"[facility] Note     Provider: Anusha Painting MD  Facility: [facility]  Facility Type:  TCU    Allergies   Allergen Reactions    Acamprosate Other (See Comments), Headache and Nausea and Vomiting    Amoxicillin-Pot Clavulanate GI Disturbance    Carbamazepine Other (See Comments)     Patient felt \"drunk\".     Cyclobenzaprine Shortness Of Breath, Other (See Comments) and Unknown     Mouth ulcers and sores per pt      Mirtazapine      Other reaction(s): slowed breathing  Other reaction(s): slowed breathing    Prednisone     Pregabalin Shortness Of Breath, Other (See Comments), Anaphylaxis and Unknown     Throat closes per pt    Other reaction(s): anaphylaxis    Sulfa Antibiotics Shortness Of Breath, Anaphylaxis and Unknown    Sulfamethoxazole-Trimethoprim      Other reaction(s): Respiratory problems, e.g., wheezingDermatological problems, e.g., rash, hives    Zolpidem Other (See Comments)     Sleep walking    Other reaction(s): sleep walking    Acetaminophen Other (See Comments)     Rebound headaches      Amiloride Swelling and Unknown    Amoxicillin Diarrhea, Nausea and Vomiting and Unknown    Aspirin Other (See Comments)     History of gastric ulcers and instructed to not take more than 81 mg/d, 10/5/17 takes 81 mg at home  Stomach       Bupropion Other (See Comments)     Dizziness, confusion, fell 3 times. Mental Confusion.     Chromium Other (See Comments)     Staples: Reaction to all metals.States serious reactions after surgery       Duloxetine Hcl Difficulty breathing    Duloxetine Hcl Other (See Comments)    Nsaids Other (See Comments)     H/o Stomach ulcers      Other Drug Allergy (See Comments)      [place]dall: turned black into the groin, was told to never have staples again    Oxycodone Headache    Serotonin Other (See Comments)    Sulindac     Tolmetin Other (See Comments) and Unknown     History of ulcer      Verapamil Other (See Comments)     Bradycardia    Valproic Acid Rash "       Labs Reviewed by provider: BMP     Verbal Order/Direction given by Provider: No new orders.      Provider giving Order:  Anusha Painting MD    Verbal Order given to: Shantel([phone])    Yogi Omalley RN
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
## 2024-02-05 NOTE — LETTER
[facility]       To:  [facility] SW            Please give to facility      From:   Rochelle Weber [facility]  Care Coordinator   [facility]   P: [phone]  [email or web address]        Patient Name:    Sandy Spencer   YOB: 1940   Admit date:   2-2-2024        Information Needed:  Please contact me when the patient will discharge (or if they will move to long term care)- include the discharge date, disposition, and main diagnosis       If the patient is discharged with home care services, please provide the name of the agency    Also- Please inform me if a care conference is being held.     Phone or Email with information                              Thank you

## 2024-02-08 ENCOUNTER — TRANSITIONAL CARE UNIT VISIT (OUTPATIENT)
Dept: GERIATRICS | Facility: CLINIC | Age: 84
End: 2024-02-08
Payer: MEDICARE

## 2024-02-08 VITALS
DIASTOLIC BLOOD PRESSURE: 63 MMHG | BODY MASS INDEX: 33.84 KG/M2 | RESPIRATION RATE: 20 BRPM | HEIGHT: 63 IN | SYSTOLIC BLOOD PRESSURE: 101 MMHG | TEMPERATURE: 97.7 F | OXYGEN SATURATION: 91 % | WEIGHT: 191 LBS | HEART RATE: 64 BPM

## 2024-02-08 DIAGNOSIS — N18.4 CKD (CHRONIC KIDNEY DISEASE) STAGE 4, GFR 15-29 ML/MIN (H): ICD-10-CM

## 2024-02-08 DIAGNOSIS — M62.81 MUSCLE WEAKNESS (GENERALIZED): ICD-10-CM

## 2024-02-08 DIAGNOSIS — F06.4 ANXIETY DISORDER DUE TO MEDICAL CONDITION: ICD-10-CM

## 2024-02-08 DIAGNOSIS — Z79.01 LONG TERM CURRENT USE OF ANTICOAGULANT THERAPY: ICD-10-CM

## 2024-02-08 DIAGNOSIS — F31.2 BIPOLAR DISORDER, CURRENT EPISODE MANIC SEVERE WITH PSYCHOTIC FEATURES (H): Primary | ICD-10-CM

## 2024-02-08 DIAGNOSIS — I10 PRIMARY HYPERTENSION: ICD-10-CM

## 2024-02-08 DIAGNOSIS — G90.523 COMPLEX REGIONAL PAIN SYNDROME TYPE 1 OF BOTH LOWER EXTREMITIES: ICD-10-CM

## 2024-02-08 DIAGNOSIS — R29.6 FALLS FREQUENTLY: ICD-10-CM

## 2024-02-08 PROCEDURE — 99309 SBSQ NF CARE MODERATE MDM 30: CPT | Performed by: FAMILY MEDICINE
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
## 2024-02-09 ENCOUNTER — LAB REQUISITION (OUTPATIENT)
Dept: LAB | Facility: CLINIC | Age: 84
End: 2024-02-09
Payer: MEDICARE

## 2024-02-09 ENCOUNTER — TELEPHONE (OUTPATIENT)
Dept: GERIATRICS | Facility: CLINIC | Age: 84
End: 2024-02-09
Payer: MEDICARE

## 2024-02-09 DIAGNOSIS — I25.10 ATHEROSCLEROTIC HEART DISEASE OF NATIVE CORONARY ARTERY WITHOUT ANGINA PECTORIS: ICD-10-CM

## 2024-02-09 RX ORDER — CARBOXYMETHYLCELLULOSE SODIUM 5 MG/ML
1 SOLUTION/ DROPS OPHTHALMIC 3 TIMES DAILY
COMMUNITY

## 2024-02-09 NOTE — TELEPHONE ENCOUNTER
"[facility] Triage Nurse Telephone Encounter    Provider: Anusha Painting MD  Facility: [facility]  Facility Type:  TCU    Caller: Tiffanie   Call Back Number: [phone]    Allergies:    Allergies   Allergen Reactions    Acamprosate Other (See Comments), Headache and Nausea and Vomiting    Amoxicillin-Pot Clavulanate GI Disturbance    Carbamazepine Other (See Comments)     Patient felt \"drunk\".     Cyclobenzaprine Shortness Of Breath, Other (See Comments) and Unknown     Mouth ulcers and sores per pt      Mirtazapine      Other reaction(s): slowed breathing  Other reaction(s): slowed breathing    Prednisone     Pregabalin Shortness Of Breath, Other (See Comments), Anaphylaxis and Unknown     Throat closes per pt    Other reaction(s): anaphylaxis    Sulfa Antibiotics Shortness Of Breath, Anaphylaxis and Unknown    Sulfamethoxazole-Trimethoprim      Other reaction(s): Respiratory problems, e.g., wheezingDermatological problems, e.g., rash, hives    Zolpidem Other (See Comments)     Sleep walking    Other reaction(s): sleep walking    Acetaminophen Other (See Comments)     Rebound headaches      Amiloride Swelling and Unknown    Amoxicillin Diarrhea, Nausea and Vomiting and Unknown    Aspirin Other (See Comments)     History of gastric ulcers and instructed to not take more than 81 mg/d, 10/5/17 takes 81 mg at home  Stomach       Bupropion Other (See Comments)     Dizziness, confusion, fell 3 times. Mental Confusion.     Chromium Other (See Comments)     Staples: Reaction to all metals.States serious reactions after surgery       Duloxetine Hcl Difficulty breathing    Duloxetine Hcl Other (See Comments)    Nsaids Other (See Comments)     H/o Stomach ulcers      Other Drug Allergy (See Comments)      [place]: turned black into the groin, was told to never have staples again    Oxycodone Headache    Serotonin Other (See Comments)    Sulindac     Tolmetin Other (See Comments) and Unknown     " History of ulcer      Verapamil Other (See Comments)     Bradycardia    Valproic Acid Rash        Reason for call: The patient is c/o of dry eyes from her macular degeneration.  Currently has an order for Restasis twice daily.  Per the patient at home she uses her Restasis more around 8 times per day.   Nursing wondering if she can have increase in her Restasis or another eyedrop for dry eyes.     Verbal Order/Direction given by Provider:   - Refresh tears eye drops 1 drop in both eyes TID    Provider giving Order:  CYNDI Zepeda    Verbal Order given to: Tiffanie by Althea Cole RN on 2/9/24    Brielle Hanson RN

## 2024-02-13 ENCOUNTER — TELEPHONE (OUTPATIENT)
Dept: GERIATRICS | Facility: CLINIC | Age: 84
End: 2024-02-13
Payer: MEDICARE

## 2024-02-13 LAB
ANION GAP SERPL CALCULATED.3IONS-SCNC: 11 MMOL/L (ref 7–15)
BUN SERPL-MCNC: 26.3 MG/DL (ref 8–23)
CALCIUM SERPL-MCNC: 8.5 MG/DL (ref 8.8–10.2)
CHLORIDE SERPL-SCNC: 103 MMOL/L (ref 98–107)
CREAT SERPL-MCNC: 1.56 MG/DL (ref 0.51–0.95)
DEPRECATED HCO3 PLAS-SCNC: 24 MMOL/L (ref 22–29)
EGFRCR SERPLBLD CKD-EPI 2021: 33 ML/MIN/1.73M2
ERYTHROCYTE [DISTWIDTH] IN BLOOD BY AUTOMATED COUNT: 14.7 % (ref 10–15)
GLUCOSE SERPL-MCNC: 72 MG/DL (ref 70–99)
HCT VFR BLD AUTO: 28.9 % (ref 35–47)
HGB BLD-MCNC: 9.2 G/DL (ref 11.7–15.7)
MAGNESIUM SERPL-MCNC: 2.2 MG/DL (ref 1.7–2.3)
MCH RBC QN AUTO: 32.4 PG (ref 26.5–33)
MCHC RBC AUTO-ENTMCNC: 31.8 G/DL (ref 31.5–36.5)
MCV RBC AUTO: 102 FL (ref 78–100)
PLATELET # BLD AUTO: 148 10E3/UL (ref 150–450)
POTASSIUM SERPL-SCNC: 3.9 MMOL/L (ref 3.4–5.3)
RBC # BLD AUTO: 2.84 10E6/UL (ref 3.8–5.2)
SODIUM SERPL-SCNC: 138 MMOL/L (ref 135–145)
WBC # BLD AUTO: 5.8 10E3/UL (ref 4–11)

## 2024-02-13 PROCEDURE — 83735 ASSAY OF MAGNESIUM: CPT | Performed by: FAMILY MEDICINE

## 2024-02-13 PROCEDURE — 85014 HEMATOCRIT: CPT | Performed by: FAMILY MEDICINE

## 2024-02-13 PROCEDURE — P9603 ONE-WAY ALLOW PRORATED MILES: HCPCS | Performed by: FAMILY MEDICINE

## 2024-02-13 PROCEDURE — 36415 COLL VENOUS BLD VENIPUNCTURE: CPT | Performed by: FAMILY MEDICINE

## 2024-02-13 PROCEDURE — 80048 BASIC METABOLIC PNL TOTAL CA: CPT | Performed by: FAMILY MEDICINE

## 2024-02-13 NOTE — TELEPHONE ENCOUNTER
"[facility] Note     Provider: Anusha Painting MD  Facility: [facility]  Facility Type:  TCU    Allergies   Allergen Reactions    Acamprosate Other (See Comments), Headache and Nausea and Vomiting    Amoxicillin-Pot Clavulanate GI Disturbance    Carbamazepine Other (See Comments)     Patient felt \"drunk\".     Cyclobenzaprine Shortness Of Breath, Other (See Comments) and Unknown     Mouth ulcers and sores per pt      Mirtazapine      Other reaction(s): slowed breathing  Other reaction(s): slowed breathing    Prednisone     Pregabalin Shortness Of Breath, Other (See Comments), Anaphylaxis and Unknown     Throat closes per pt    Other reaction(s): anaphylaxis    Sulfa Antibiotics Shortness Of Breath, Anaphylaxis and Unknown    Sulfamethoxazole-Trimethoprim      Other reaction(s): Respiratory problems, e.g., wheezingDermatological problems, e.g., rash, hives    Zolpidem Other (See Comments)     Sleep walking    Other reaction(s): sleep walking    Acetaminophen Other (See Comments)     Rebound headaches      Amiloride Swelling and Unknown    Amoxicillin Diarrhea, Nausea and Vomiting and Unknown    Aspirin Other (See Comments)     History of gastric ulcers and instructed to not take more than 81 mg/d, 10/5/17 takes 81 mg at home  Stomach       Bupropion Other (See Comments)     Dizziness, confusion, fell 3 times. Mental Confusion.     Chromium Other (See Comments)     Staples: Reaction to all metals.States serious reactions after surgery       Duloxetine Hcl Difficulty breathing    Duloxetine Hcl Other (See Comments)    Nsaids Other (See Comments)     H/o Stomach ulcers      Other Drug Allergy (See Comments)      [place]: turned black into the groin, was told to never have staples again    Oxycodone Headache    Serotonin Other (See Comments)    Sulindac     Tolmetin Other (See Comments) and Unknown     History of ulcer      Verapamil Other (See Comments)     Bradycardia    Valproic Acid Rash "       Labs Reviewed by provider: Heme 2, BMP, Mg     Verbal Order/Direction given by Provider: No new orders.      Provider giving Order:  Anusha Painting MD    Verbal Order given to: Linda([phone])    Yogi Omalley RN

## 2024-02-13 NOTE — TELEPHONE ENCOUNTER
Pt currently in TCU. Daughter, Viola, confirmed this. No need for appt right now. Hoping to get patient into assisted living facility.

## 2024-02-22 ENCOUNTER — TRANSITIONAL CARE UNIT VISIT (OUTPATIENT)
Dept: GERIATRICS | Facility: CLINIC | Age: 84
End: 2024-02-22
Payer: MEDICARE

## 2024-02-22 VITALS
OXYGEN SATURATION: 94 % | DIASTOLIC BLOOD PRESSURE: 67 MMHG | SYSTOLIC BLOOD PRESSURE: 98 MMHG | WEIGHT: 189 LBS | TEMPERATURE: 98 F | BODY MASS INDEX: 33.49 KG/M2 | HEIGHT: 63 IN | HEART RATE: 79 BPM | RESPIRATION RATE: 18 BRPM

## 2024-02-22 DIAGNOSIS — F06.4 ANXIETY DISORDER DUE TO MEDICAL CONDITION: ICD-10-CM

## 2024-02-22 DIAGNOSIS — M62.81 MUSCLE WEAKNESS (GENERALIZED): ICD-10-CM

## 2024-02-22 DIAGNOSIS — J41.0 SIMPLE CHRONIC BRONCHITIS (H): ICD-10-CM

## 2024-02-22 DIAGNOSIS — Z79.01 LONG TERM CURRENT USE OF ANTICOAGULANT THERAPY: ICD-10-CM

## 2024-02-22 DIAGNOSIS — F31.2 BIPOLAR DISORDER, CURRENT EPISODE MANIC SEVERE WITH PSYCHOTIC FEATURES (H): Primary | ICD-10-CM

## 2024-02-22 DIAGNOSIS — I10 PRIMARY HYPERTENSION: ICD-10-CM

## 2024-02-22 DIAGNOSIS — I25.119 CORONARY ARTERY DISEASE INVOLVING NATIVE CORONARY ARTERY OF NATIVE HEART WITH ANGINA PECTORIS (H): ICD-10-CM

## 2024-02-22 DIAGNOSIS — I27.82 OTHER CHRONIC PULMONARY EMBOLISM WITHOUT ACUTE COR PULMONALE (H): ICD-10-CM

## 2024-02-22 DIAGNOSIS — R29.6 FALLS FREQUENTLY: ICD-10-CM

## 2024-02-22 PROCEDURE — 99309 SBSQ NF CARE MODERATE MDM 30: CPT | Performed by: FAMILY MEDICINE

## 2024-02-22 RX ORDER — AZELASTINE HYDROCHLORIDE 137 UG/1
2 SPRAY, METERED NASAL 2 TIMES DAILY
COMMUNITY
End: 2024-09-03
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
## 2024-02-23 ENCOUNTER — PATIENT OUTREACH (OUTPATIENT)
Dept: CARE COORDINATION | Facility: CLINIC | Age: 84
End: 2024-02-23
Payer: MEDICARE

## 2024-02-23 NOTE — PROGRESS NOTES
Clinic Care Coordination Contact  3-4-2024  Call from [facility] SW and patient is discharging tomorrow to [facility] on their campus, the [facility].    Plan- call daughter in 5-10 business days to assess needs.    Follow Up Progress Note      Assessment: Call from daughter. Patient continues to receive care at [facility]. They have care conference scheduled on March 4th. Daughter has been arranging for patient to go to assisted living at [facility], called [facility]. She will need extra supports like for her bathing, but they can purchase that.  Daughter has been busy cleaning out her house and it should go up for sale in a week. [facility] to see if she can continue to use EBT card.  She hasn't used it lately and it needs to be found.  Kerry is trying to honor patient's requests as she thinks she has Parkinson's disease and wants to consult with [facility].  Kerry is going to hold off on any appts until she is moved.  She now has a DNR.  Discussed that patient could choose to have primary care at the [facility] and they will discuss.  She really likes her PCP at [facility] however.       Care Gaps:    Health Maintenance Due   Topic Date Due    COPD ACTION PLAN  Never done    RSV VACCINE (Pregnancy & 60+) (1 - 1-dose 60+ series) Never done    MEDICARE ANNUAL WELLNESS VISIT  Never done    COVID-19 Vaccine (1 - 2023-24 season) Never done    MICROALBUMIN  03/01/2024       Postponed to when she is out of TCU.      Care Plans  Care Plan: Medication Regimen       Problem: Medication Adherence       Long-Range Goal: I will take my medications as prescribed.       Start Date: 8/1/2023 Expected End Date: 7/31/2024    This Visit's Progress: 40% Recent Progress: 30%    Priority: High    Note:     Barriers: doesn't always take her medications as prescribed.   Strengths: daughter can assist.   Patient expressed understanding of goal: daughter does  Action steps to achieve this goal:  1. Daughter will work with MTM  to understand current medication regime.  2. Daughter will work with PCP and pain clinic to see if a psychiatrist is needed.    3. Daughter will report progress towards this goal at scheduled outreach telephone calls from the [facility] team.                               Care Plan: Help At Home       Problem: Insufficient In-home support       Long-Range Goal: I have enough support at home to live independently.       Start Date: 8/1/2023 Expected End Date: 7/31/2024    This Visit's Progress: On Hold Recent Progress: 50%    Priority: High    Note:     Barriers: patient may not accept.   Strengths: daughter is supportive  Patient expressed understanding of goal: daughter does  Action steps to achieve this goal:  1. Daughter will monitor patient's needs.  2. Daughter will work with care team to get needed services.   3. Daughter will report progress towards this goal at scheduled outreach telephone calls from the [facility] team.  8-9 has a waiver, gets meals                                Intervention/Education provided during outreach: support for daughter.      Outreach Frequency: monthly, more frequently as needed      Plan:   Daughter to call with update after patient is out of TCU.   Care Coordinator will follow up after discharge from TCU.  Rochelle Weber,   [facility]  [phone]

## 2024-02-26 ENCOUNTER — TRANSITIONAL CARE UNIT VISIT (OUTPATIENT)
Dept: GERIATRICS | Facility: CLINIC | Age: 84
End: 2024-02-26
Payer: MEDICARE

## 2024-02-26 VITALS
HEIGHT: 63 IN | DIASTOLIC BLOOD PRESSURE: 71 MMHG | BODY MASS INDEX: 33.49 KG/M2 | HEART RATE: 84 BPM | TEMPERATURE: 92 F | SYSTOLIC BLOOD PRESSURE: 114 MMHG | WEIGHT: 189 LBS | RESPIRATION RATE: 18 BRPM | OXYGEN SATURATION: 94 %

## 2024-02-26 DIAGNOSIS — N18.4 CKD (CHRONIC KIDNEY DISEASE) STAGE 4, GFR 15-29 ML/MIN (H): ICD-10-CM

## 2024-02-26 DIAGNOSIS — F31.2 BIPOLAR DISORDER, CURRENT EPISODE MANIC SEVERE WITH PSYCHOTIC FEATURES (H): Primary | ICD-10-CM

## 2024-02-26 DIAGNOSIS — F06.4 ANXIETY DISORDER DUE TO MEDICAL CONDITION: ICD-10-CM

## 2024-02-26 DIAGNOSIS — Z79.01 LONG TERM CURRENT USE OF ANTICOAGULANT THERAPY: ICD-10-CM

## 2024-02-26 DIAGNOSIS — M62.81 GENERALIZED MUSCLE WEAKNESS: ICD-10-CM

## 2024-02-26 DIAGNOSIS — I10 PRIMARY HYPERTENSION: ICD-10-CM

## 2024-02-26 DIAGNOSIS — G90.523 COMPLEX REGIONAL PAIN SYNDROME TYPE 1 OF BOTH LOWER EXTREMITIES: ICD-10-CM

## 2024-02-26 DIAGNOSIS — O22.30 DVT (DEEP VEIN THROMBOSIS) IN PREGNANCY: ICD-10-CM

## 2024-02-26 DIAGNOSIS — R29.6 FALLS FREQUENTLY: ICD-10-CM

## 2024-02-26 PROCEDURE — 99309 SBSQ NF CARE MODERATE MDM 30: CPT | Performed by: FAMILY MEDICINE
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
## 2024-02-29 ENCOUNTER — DISCHARGE SUMMARY NURSING HOME (OUTPATIENT)
Dept: GERIATRICS | Facility: CLINIC | Age: 84
End: 2024-02-29
Payer: MEDICARE

## 2024-02-29 VITALS
RESPIRATION RATE: 18 BRPM | TEMPERATURE: 92 F | HEIGHT: 63 IN | OXYGEN SATURATION: 94 % | BODY MASS INDEX: 33.49 KG/M2 | HEART RATE: 79 BPM | WEIGHT: 189 LBS | SYSTOLIC BLOOD PRESSURE: 123 MMHG | DIASTOLIC BLOOD PRESSURE: 62 MMHG

## 2024-02-29 DIAGNOSIS — N18.4 CKD (CHRONIC KIDNEY DISEASE) STAGE 4, GFR 15-29 ML/MIN (H): ICD-10-CM

## 2024-02-29 DIAGNOSIS — F31.2 BIPOLAR DISORDER, CURRENT EPISODE MANIC SEVERE WITH PSYCHOTIC FEATURES (H): Primary | ICD-10-CM

## 2024-02-29 DIAGNOSIS — I10 PRIMARY HYPERTENSION: ICD-10-CM

## 2024-02-29 DIAGNOSIS — F06.4 ANXIETY DISORDER DUE TO MEDICAL CONDITION: ICD-10-CM

## 2024-02-29 DIAGNOSIS — R29.6 FALLS FREQUENTLY: ICD-10-CM

## 2024-02-29 DIAGNOSIS — M62.81 GENERALIZED MUSCLE WEAKNESS: ICD-10-CM

## 2024-02-29 PROCEDURE — 99315 NF DSCHRG MGMT 30 MIN/LESS: CPT | Performed by: FAMILY MEDICINE
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
## 2024-03-06 ENCOUNTER — DOCUMENTATION ONLY (OUTPATIENT)
Dept: FAMILY MEDICINE | Facility: CLINIC | Age: 84
End: 2024-03-06
Payer: MEDICARE

## 2024-03-06 DIAGNOSIS — Z76.89 ENCOUNTER FOR SUPPORT AND COORDINATION OF TRANSITION OF CARE: Primary | ICD-10-CM

## 2024-03-06 NOTE — PROGRESS NOTES
Notice from care team patient has discharged from care facility and MTM referral to be placed for medication review.   Discharge date: TCU to home on 3/5/24.     Primary Provider Clinic Location:  [address]  [address] [address] [phone]  PCP Listed: Caitlyn Rees MD    Action:  MTM referral placed    Alisha Randle, Pharm.D, BCACP  Medication Therapy Management Pharmacist  [phone]

## 2024-03-11 ENCOUNTER — TELEPHONE (OUTPATIENT)
Dept: FAMILY MEDICINE | Facility: CLINIC | Age: 84
End: 2024-03-11
Payer: MEDICARE

## 2024-03-11 NOTE — TELEPHONE ENCOUNTER
MTM referral from: Transitions of Care (recent hospital discharge or ED visit)    MTM referral outreach attempt #2 on March 11, 2024 at 10:07 AM      Outcome: Spoke with patient's daughter, she declined services, patient is in asl    Use vbc (tcu) for the carrier/Plan on the flowsheet          Mary Kaur CPhT  MTM

## 2024-03-12 ENCOUNTER — TELEPHONE (OUTPATIENT)
Dept: FAMILY MEDICINE | Facility: CLINIC | Age: 84
End: 2024-03-12
Payer: MEDICARE

## 2024-03-12 NOTE — TELEPHONE ENCOUNTER
Home Care is calling regarding an established patient with [facility].    Requesting orders from: Caitlyn Rees  Provider is following patient: Yes  Is this a 60-day recertification request?  No    Orders Requested    Physical Therapy  Request for delay in care, service is not able to be provided within same scheduled day.   Delayed until 3/14.    Occupational Therapy  Request for delay in care, service is not able to be provided within same scheduled day.   Delayed until 3/14      Information was gathered and will be sent to provider for review.  RN will contact Home Care with information after provider review.  Confirmed ok to leave a detailed message with call back.  Contact information confirmed and updated as needed.    Maame Patterson RN

## 2024-03-13 ENCOUNTER — TELEPHONE (OUTPATIENT)
Dept: FAMILY MEDICINE | Facility: CLINIC | Age: 84
End: 2024-03-13
Payer: MEDICARE

## 2024-03-13 ENCOUNTER — MEDICAL CORRESPONDENCE (OUTPATIENT)
Dept: HEALTH INFORMATION MANAGEMENT | Facility: CLINIC | Age: 84
End: 2024-03-13
Payer: MEDICARE

## 2024-03-13 NOTE — TELEPHONE ENCOUNTER
Forms/Letter Request    Type of form/letter: Home Health Certification      Do we have the form/letter: Yes: placed in [employer] inbox    Who is the form from? Home care    Where did/will the form come from? form was faxed in    When is form/letter needed by: when done    How would you like the form/letter returned: Fax : [phone]    Patient Notified form requests are processed in 5-7 business days:No    Could we send this information to you in [employer] or would you prefer to receive a phone call?:   n/a

## 2024-03-14 ENCOUNTER — MEDICAL CORRESPONDENCE (OUTPATIENT)
Dept: HEALTH INFORMATION MANAGEMENT | Facility: CLINIC | Age: 84
End: 2024-03-14

## 2024-03-15 ENCOUNTER — PATIENT OUTREACH (OUTPATIENT)
Dept: CARE COORDINATION | Facility: CLINIC | Age: 84
End: 2024-03-15
Payer: MEDICARE

## 2024-03-15 NOTE — PROGRESS NOTES
[facility]    Clinical Data: Care Coordinator Outreach    Outreach Documentation Number of Outreach Attempt   3/15/2024   2:53 PM 1       Left message on  daughter's  voicemail with call back information and requested return call.    Plan: Care Coordinator will try to reach daughter again in 10 business days.    Rochelle Weber,   [facility]  [phone]

## 2024-03-18 ENCOUNTER — TELEPHONE (OUTPATIENT)
Dept: FAMILY MEDICINE | Facility: CLINIC | Age: 84
End: 2024-03-18
Payer: MEDICARE

## 2024-03-18 NOTE — TELEPHONE ENCOUNTER
Home Care is calling regarding an established patient with [facility].       Requesting orders from: Caitlyn Rees  Provider is following patient: Yes  Is this a 60-day recertification request?  No    Orders Requested    Physical Therapy  Request for initial certification (first set of orders)   Frequency:  2x/wk for 2 wks  then 1x/wk for 1 wks      Information was gathered and will be sent to provider for review.  RN will contact Home Care with information after provider review.  Confirmed ok to leave a detailed message with call back to Bill at [phone]    Litzy Amador RN

## 2024-03-19 ENCOUNTER — LAB REQUISITION (OUTPATIENT)
Dept: LAB | Facility: CLINIC | Age: 84
End: 2024-03-19
Payer: MEDICARE

## 2024-03-19 ENCOUNTER — TELEPHONE (OUTPATIENT)
Dept: FAMILY MEDICINE | Facility: CLINIC | Age: 84
End: 2024-03-19
Payer: MEDICARE

## 2024-03-19 DIAGNOSIS — F31.9 BIPOLAR DISORDER, UNSPECIFIED (H): ICD-10-CM

## 2024-03-19 NOTE — TELEPHONE ENCOUNTER
Home Care is calling regarding an established patient with [facility].    Requesting orders from: Caitlyn Rees  Provider is following patient: Yes  Is this a 60-day recertification request?  No    Orders Requested    Occupational Therapy  Request for initial certification (first set of orders)   Frequency:  2x/wk for 2 wks  then 1x/wk for 1 wks      Information was gathered and will be sent to provider for review.  RN will contact Home Care with information after provider review.  Confirmed ok to leave a detailed message with call back.  Contact information confirmed and updated as needed.    Maame Patterson RN

## 2024-03-20 LAB
ALBUMIN SERPL BCG-MCNC: 3.6 G/DL (ref 3.5–5.2)
ALP SERPL-CCNC: 52 U/L (ref 40–150)
ALT SERPL W P-5'-P-CCNC: <5 U/L (ref 0–50)
ANION GAP SERPL CALCULATED.3IONS-SCNC: 12 MMOL/L (ref 7–15)
AST SERPL W P-5'-P-CCNC: 22 U/L (ref 0–45)
BASOPHILS # BLD AUTO: 0 10E3/UL (ref 0–0.2)
BASOPHILS NFR BLD AUTO: 1 %
BILIRUB SERPL-MCNC: 0.2 MG/DL
BUN SERPL-MCNC: 30 MG/DL (ref 8–23)
CALCIUM SERPL-MCNC: 9.1 MG/DL (ref 8.8–10.2)
CHLORIDE SERPL-SCNC: 103 MMOL/L (ref 98–107)
CHOLEST SERPL-MCNC: 171 MG/DL
CREAT SERPL-MCNC: 1.55 MG/DL (ref 0.51–0.95)
DEPRECATED HCO3 PLAS-SCNC: 25 MMOL/L (ref 22–29)
EGFRCR SERPLBLD CKD-EPI 2021: 33 ML/MIN/1.73M2
EOSINOPHIL # BLD AUTO: 0.2 10E3/UL (ref 0–0.7)
EOSINOPHIL NFR BLD AUTO: 5 %
ERYTHROCYTE [DISTWIDTH] IN BLOOD BY AUTOMATED COUNT: 15.9 % (ref 10–15)
FASTING STATUS PATIENT QL REPORTED: NORMAL
GLUCOSE SERPL-MCNC: 86 MG/DL (ref 70–99)
HCT VFR BLD AUTO: 30.3 % (ref 35–47)
HDLC SERPL-MCNC: 60 MG/DL
HGB BLD-MCNC: 9.7 G/DL (ref 11.7–15.7)
IMM GRANULOCYTES # BLD: 0 10E3/UL
IMM GRANULOCYTES NFR BLD: 1 %
LDLC SERPL CALC-MCNC: 84 MG/DL
LEVETIRACETAM SERPL-MCNC: 37.3 ΜG/ML (ref 10–40)
LYMPHOCYTES # BLD AUTO: 1.3 10E3/UL (ref 0.8–5.3)
LYMPHOCYTES NFR BLD AUTO: 30 %
MCH RBC QN AUTO: 32.8 PG (ref 26.5–33)
MCHC RBC AUTO-ENTMCNC: 32 G/DL (ref 31.5–36.5)
MCV RBC AUTO: 102 FL (ref 78–100)
MONOCYTES # BLD AUTO: 0.7 10E3/UL (ref 0–1.3)
MONOCYTES NFR BLD AUTO: 15 %
NEUTROPHILS # BLD AUTO: 2.1 10E3/UL (ref 1.6–8.3)
NEUTROPHILS NFR BLD AUTO: 48 %
NONHDLC SERPL-MCNC: 111 MG/DL
NRBC # BLD AUTO: 0 10E3/UL
NRBC BLD AUTO-RTO: 0 /100
PLATELET # BLD AUTO: 151 10E3/UL (ref 150–450)
POTASSIUM SERPL-SCNC: 4.2 MMOL/L (ref 3.4–5.3)
PROT SERPL-MCNC: 5.9 G/DL (ref 6.4–8.3)
RBC # BLD AUTO: 2.96 10E6/UL (ref 3.8–5.2)
SODIUM SERPL-SCNC: 140 MMOL/L (ref 135–145)
TRIGL SERPL-MCNC: 137 MG/DL
TSH SERPL DL<=0.005 MIU/L-ACNC: 4.19 UIU/ML (ref 0.3–4.2)
VALPROATE SERPL-MCNC: 53.6 UG/ML
WBC # BLD AUTO: 4.2 10E3/UL (ref 4–11)

## 2024-03-20 PROCEDURE — 80061 LIPID PANEL: CPT | Mod: ORL

## 2024-03-20 PROCEDURE — 80177 DRUG SCRN QUAN LEVETIRACETAM: CPT | Mod: ORL

## 2024-03-20 PROCEDURE — 36415 COLL VENOUS BLD VENIPUNCTURE: CPT | Mod: ORL

## 2024-03-20 PROCEDURE — 80053 COMPREHEN METABOLIC PANEL: CPT | Mod: ORL

## 2024-03-20 PROCEDURE — 85025 COMPLETE CBC W/AUTO DIFF WBC: CPT | Mod: ORL

## 2024-03-20 PROCEDURE — 84443 ASSAY THYROID STIM HORMONE: CPT | Mod: ORL

## 2024-03-20 PROCEDURE — P9604 ONE-WAY ALLOW PRORATED TRIP: HCPCS | Mod: ORL

## 2024-03-20 PROCEDURE — 80164 ASSAY DIPROPYLACETIC ACD TOT: CPT | Mod: ORL

## 2024-03-21 ENCOUNTER — LAB REQUISITION (OUTPATIENT)
Dept: LAB | Facility: CLINIC | Age: 84
End: 2024-03-21
Payer: MEDICARE

## 2024-03-21 DIAGNOSIS — E78.00 PURE HYPERCHOLESTEROLEMIA, UNSPECIFIED: ICD-10-CM

## 2024-03-21 DIAGNOSIS — E03.9 HYPOTHYROIDISM, UNSPECIFIED: ICD-10-CM

## 2024-03-21 DIAGNOSIS — I10 ESSENTIAL (PRIMARY) HYPERTENSION: ICD-10-CM

## 2024-03-21 DIAGNOSIS — F31.9 BIPOLAR DISORDER, UNSPECIFIED (H): ICD-10-CM

## 2024-03-21 NOTE — TELEPHONE ENCOUNTER
Mailbox is full and unable to leave a message for PT Bill with [employer]. Try again later.     Cristina Holman CMA.

## 2024-03-22 ENCOUNTER — TELEPHONE (OUTPATIENT)
Dept: FAMILY MEDICINE | Facility: CLINIC | Age: 84
End: 2024-03-22

## 2024-03-22 LAB
BASOPHILS # BLD AUTO: 0 10E3/UL (ref 0–0.2)
BASOPHILS NFR BLD AUTO: 0 %
EOSINOPHIL # BLD AUTO: 0.2 10E3/UL (ref 0–0.7)
EOSINOPHIL NFR BLD AUTO: 3 %
ERYTHROCYTE [DISTWIDTH] IN BLOOD BY AUTOMATED COUNT: 16.1 % (ref 10–15)
HCT VFR BLD AUTO: 29.6 % (ref 35–47)
HGB BLD-MCNC: 9.8 G/DL (ref 11.7–15.7)
IMM GRANULOCYTES # BLD: 0.1 10E3/UL
IMM GRANULOCYTES NFR BLD: 1 %
LYMPHOCYTES # BLD AUTO: 1.6 10E3/UL (ref 0.8–5.3)
LYMPHOCYTES NFR BLD AUTO: 31 %
MCH RBC QN AUTO: 34.1 PG (ref 26.5–33)
MCHC RBC AUTO-ENTMCNC: 33.1 G/DL (ref 31.5–36.5)
MCV RBC AUTO: 103 FL (ref 78–100)
MONOCYTES # BLD AUTO: 0.8 10E3/UL (ref 0–1.3)
MONOCYTES NFR BLD AUTO: 15 %
NEUTROPHILS # BLD AUTO: 2.6 10E3/UL (ref 1.6–8.3)
NEUTROPHILS NFR BLD AUTO: 50 %
NRBC # BLD AUTO: 0 10E3/UL
NRBC BLD AUTO-RTO: 0 /100
PLATELET # BLD AUTO: 154 10E3/UL (ref 150–450)
RBC # BLD AUTO: 2.87 10E6/UL (ref 3.8–5.2)
WBC # BLD AUTO: 5.2 10E3/UL (ref 4–11)

## 2024-03-22 PROCEDURE — P9604 ONE-WAY ALLOW PRORATED TRIP: HCPCS | Mod: ORL | Performed by: REGISTERED NURSE

## 2024-03-22 PROCEDURE — 80164 ASSAY DIPROPYLACETIC ACD TOT: CPT | Mod: ORL | Performed by: REGISTERED NURSE

## 2024-03-22 PROCEDURE — 36415 COLL VENOUS BLD VENIPUNCTURE: CPT | Mod: ORL | Performed by: REGISTERED NURSE

## 2024-03-22 PROCEDURE — 84443 ASSAY THYROID STIM HORMONE: CPT | Mod: ORL | Performed by: REGISTERED NURSE

## 2024-03-22 PROCEDURE — 85025 COMPLETE CBC W/AUTO DIFF WBC: CPT | Mod: ORL | Performed by: REGISTERED NURSE

## 2024-03-22 PROCEDURE — 80053 COMPREHEN METABOLIC PANEL: CPT | Mod: ORL | Performed by: REGISTERED NURSE

## 2024-03-22 PROCEDURE — 80061 LIPID PANEL: CPT | Mod: ORL | Performed by: REGISTERED NURSE

## 2024-03-22 PROCEDURE — 80177 DRUG SCRN QUAN LEVETIRACETAM: CPT | Mod: ORL | Performed by: REGISTERED NURSE

## 2024-03-22 NOTE — TELEPHONE ENCOUNTER
Forms/Letter Request    Type of form/letter: Home Health Certification      Do we have the form/letter: Yes: in [employer] inbox    Who is the form from? Home care    Where did/will the form come from? form was faxed in    When is form/letter needed by: when done    How would you like the form/letter returned: Fax : [phone]    Patient Notified form requests are processed in 5-7 business days:    Could we send this information to you in [employer] or would you prefer to receive a phone call?:

## 2024-03-23 LAB
ALBUMIN SERPL BCG-MCNC: 3.7 G/DL (ref 3.5–5.2)
ALP SERPL-CCNC: 59 U/L (ref 40–150)
ALT SERPL W P-5'-P-CCNC: 6 U/L (ref 0–50)
ANION GAP SERPL CALCULATED.3IONS-SCNC: 11 MMOL/L (ref 7–15)
AST SERPL W P-5'-P-CCNC: 19 U/L (ref 0–45)
BILIRUB SERPL-MCNC: 0.2 MG/DL
BUN SERPL-MCNC: 35.7 MG/DL (ref 8–23)
CALCIUM SERPL-MCNC: 8.8 MG/DL (ref 8.8–10.2)
CHLORIDE SERPL-SCNC: 102 MMOL/L (ref 98–107)
CHOLEST SERPL-MCNC: 187 MG/DL
CREAT SERPL-MCNC: 1.85 MG/DL (ref 0.51–0.95)
DEPRECATED HCO3 PLAS-SCNC: 26 MMOL/L (ref 22–29)
EGFRCR SERPLBLD CKD-EPI 2021: 27 ML/MIN/1.73M2
FASTING STATUS PATIENT QL REPORTED: NO
GLUCOSE SERPL-MCNC: 101 MG/DL (ref 70–99)
HDLC SERPL-MCNC: 63 MG/DL
LDLC SERPL CALC-MCNC: 93 MG/DL
LEVETIRACETAM SERPL-MCNC: 41.2 ΜG/ML (ref 10–40)
LEVETIRACETAM SERPL-MCNC: 42.8 ΜG/ML (ref 10–40)
NONHDLC SERPL-MCNC: 124 MG/DL
POTASSIUM SERPL-SCNC: 3.9 MMOL/L (ref 3.4–5.3)
PROT SERPL-MCNC: 5.9 G/DL (ref 6.4–8.3)
SODIUM SERPL-SCNC: 139 MMOL/L (ref 135–145)
TRIGL SERPL-MCNC: 156 MG/DL
TSH SERPL DL<=0.005 MIU/L-ACNC: 3.05 UIU/ML (ref 0.3–4.2)
VALPROATE SERPL-MCNC: 55.2 UG/ML

## 2024-03-25 ENCOUNTER — TELEPHONE (OUTPATIENT)
Dept: FAMILY MEDICINE | Facility: CLINIC | Age: 84
End: 2024-03-25
Payer: MEDICARE

## 2024-03-25 NOTE — TELEPHONE ENCOUNTER
Forms/Letter Request    Type of form/letter: Home Health Certification      Do we have the form/letter: Yes: in Dr. Montoya inbox    Who is the form from? Home care    Where did/will the form come from? form was faxed in    When is form/letter needed by: when done    How would you like the form/letter returned: Fax : [phone]    Patient Notified form requests are processed in 5-7 business days:    Could we send this information to you in [employer] or would you prefer to receive a phone call?:

## 2024-03-26 ENCOUNTER — MEDICAL CORRESPONDENCE (OUTPATIENT)
Dept: HEALTH INFORMATION MANAGEMENT | Facility: CLINIC | Age: 84
End: 2024-03-26
Payer: MEDICARE

## 2024-03-26 ENCOUNTER — PATIENT OUTREACH (OUTPATIENT)
Dept: CARE COORDINATION | Facility: CLINIC | Age: 84
End: 2024-03-26
Payer: MEDICARE

## 2024-03-26 DIAGNOSIS — Z53.9 DIAGNOSIS NOT YET DEFINED: Primary | ICD-10-CM

## 2024-03-26 PROCEDURE — G0180 MD CERTIFICATION HHA PATIENT: HCPCS | Performed by: FAMILY MEDICINE

## 2024-03-26 NOTE — PROGRESS NOTES
Contact   Chart Review     Situation: Patient chart reviewed by .    Background: has moved to [facility].     Assessment: Daughter Viola called and left .  She is very happy that mom is living in assisted living as her condition has worsened and she needs this type of support. She is adjusting to her new living situation.      Plan/Recommendations: Looks like patient's PCP continues to sign orders for home care.  Will call daughter in several weeks to determine on going support needs.    Rochelle Weber,   [facility]  [phone]

## 2024-03-29 ENCOUNTER — TELEPHONE (OUTPATIENT)
Dept: FAMILY MEDICINE | Facility: CLINIC | Age: 84
End: 2024-03-29
Payer: MEDICARE

## 2024-03-29 NOTE — TELEPHONE ENCOUNTER
Home Care is calling regarding an established patient with [facility].       Requesting orders from: Caitlyn Rees  Provider is following patient: Yes  Is this a 60-day recertification request?  No    Orders Requested    Occupational Therapy  Request for continuation of care with no increase or decrease in frequency  Frequency:  same frequency, adding 5 more visits.          Verbal orders given.  Home Care will send orders for provider to sign.    Litzy Amador RN

## 2024-04-01 ENCOUNTER — TELEPHONE (OUTPATIENT)
Dept: FAMILY MEDICINE | Facility: CLINIC | Age: 84
End: 2024-04-01
Payer: MEDICARE

## 2024-04-01 NOTE — TELEPHONE ENCOUNTER
Forms/Letter Request    Type of form/letter: Home Health Certification      Do we have the form/letter: Yes: in Dr. Montoya inbox    Who is the form from? Home care    Where did/will the form come from? form was faxed in    When is form/letter needed by: when done    How would you like the form/letter returned: Fax : [phone]    Patient Notified form requests are processed in 5-7 business days:    Could we send this information to you in [employer] or would you prefer to receive a phone call?:

## 2024-04-02 ENCOUNTER — TELEPHONE (OUTPATIENT)
Dept: FAMILY MEDICINE | Facility: CLINIC | Age: 84
End: 2024-04-02
Payer: MEDICARE

## 2024-04-02 ENCOUNTER — MEDICAL CORRESPONDENCE (OUTPATIENT)
Dept: HEALTH INFORMATION MANAGEMENT | Facility: CLINIC | Age: 84
End: 2024-04-02
Payer: MEDICARE

## 2024-04-02 NOTE — TELEPHONE ENCOUNTER
Forms/Letter Request    Type of form/letter: Home Health Certification      Do we have the form/letter: Yes: in Dr. Montoya inbox    Who is the form from? Home care    Where did/will the form come from? form was faxed in    When is form/letter needed by: when done    How would you like the form/letter returned: Fax : [phone]    Patient Notified form requests are processed in 5-7 business days:    Could we send this information to you in [employer] or would you prefer to receive a phone call?:

## 2024-04-02 NOTE — TELEPHONE ENCOUNTER
Home Care is calling regarding an established patient with [facility].       Requesting orders from: Caitlyn Rees  Provider is following patient: Yes  Is this a 60-day recertification request?  No    Orders Requested    Physical Therapy  Request for continuation of care with no increase or decrease in frequency  Frequency:  2 times per week for 2 more weeks.          Verbal orders given.  Home Care will send orders for provider to sign.    Litzy Amador RN

## 2024-04-03 ENCOUNTER — MEDICAL CORRESPONDENCE (OUTPATIENT)
Dept: HEALTH INFORMATION MANAGEMENT | Facility: CLINIC | Age: 84
End: 2024-04-03
Payer: MEDICARE

## 2024-04-11 ENCOUNTER — TELEPHONE (OUTPATIENT)
Dept: FAMILY MEDICINE | Facility: CLINIC | Age: 84
End: 2024-04-11
Payer: MEDICARE

## 2024-04-11 NOTE — TELEPHONE ENCOUNTER
Forms/Letter Request    Type of form/letter: Home Health Certification      Do we have the form/letter: Yes: in Dr. Montoya inbox    Who is the form from? Home care    Where did/will the form come from? form was faxed in    When is form/letter needed by: when done    How would you like the form/letter returned: Fax : [phone]    Patient Notified form requests are processed in 5-7 business days:    Could we send this information to you in [employer] or would you prefer to receive a phone call?:

## 2024-04-12 ENCOUNTER — MEDICAL CORRESPONDENCE (OUTPATIENT)
Dept: HEALTH INFORMATION MANAGEMENT | Facility: CLINIC | Age: 84
End: 2024-04-12
Payer: MEDICARE

## 2024-04-12 DIAGNOSIS — Z53.9 DIAGNOSIS NOT YET DEFINED: Primary | ICD-10-CM

## 2024-04-12 PROCEDURE — G0180 MD CERTIFICATION HHA PATIENT: HCPCS | Performed by: FAMILY MEDICINE

## 2024-04-21 ENCOUNTER — APPOINTMENT (OUTPATIENT)
Dept: RADIOLOGY | Facility: CLINIC | Age: 84
DRG: 641 | End: 2024-04-21
Attending: EMERGENCY MEDICINE
Payer: MEDICARE

## 2024-04-21 ENCOUNTER — HOSPITAL ENCOUNTER (INPATIENT)
Facility: CLINIC | Age: 84
LOS: 3 days | Discharge: NURSING, REHAB OR LONG-TERM CARE | DRG: 641 | End: 2024-04-26
Attending: EMERGENCY MEDICINE | Admitting: INTERNAL MEDICINE
Payer: MEDICARE

## 2024-04-21 DIAGNOSIS — J44.9 CHRONIC OBSTRUCTIVE PULMONARY DISEASE, UNSPECIFIED COPD TYPE (H): ICD-10-CM

## 2024-04-21 DIAGNOSIS — R09.02 HYPOXEMIA: Primary | ICD-10-CM

## 2024-04-21 DIAGNOSIS — I50.9 ACUTE ON CHRONIC CONGESTIVE HEART FAILURE, UNSPECIFIED HEART FAILURE TYPE (H): ICD-10-CM

## 2024-04-21 LAB
ALBUMIN SERPL BCG-MCNC: 3.7 G/DL (ref 3.5–5.2)
ALP SERPL-CCNC: 58 U/L (ref 40–150)
ALT SERPL W P-5'-P-CCNC: <5 U/L (ref 0–50)
ANION GAP SERPL CALCULATED.3IONS-SCNC: 11 MMOL/L (ref 7–15)
AST SERPL W P-5'-P-CCNC: 17 U/L (ref 0–45)
BASOPHILS # BLD AUTO: 0 10E3/UL (ref 0–0.2)
BASOPHILS NFR BLD AUTO: 0 %
BILIRUB SERPL-MCNC: 0.3 MG/DL
BUN SERPL-MCNC: 29.3 MG/DL (ref 8–23)
CALCIUM SERPL-MCNC: 8.9 MG/DL (ref 8.8–10.2)
CHLORIDE SERPL-SCNC: 102 MMOL/L (ref 98–107)
CREAT SERPL-MCNC: 1.84 MG/DL (ref 0.51–0.95)
DEPRECATED HCO3 PLAS-SCNC: 25 MMOL/L (ref 22–29)
EGFRCR SERPLBLD CKD-EPI 2021: 27 ML/MIN/1.73M2
EOSINOPHIL # BLD AUTO: 0.1 10E3/UL (ref 0–0.7)
EOSINOPHIL NFR BLD AUTO: 1 %
ERYTHROCYTE [DISTWIDTH] IN BLOOD BY AUTOMATED COUNT: 15.9 % (ref 10–15)
GLUCOSE SERPL-MCNC: 100 MG/DL (ref 70–99)
HCT VFR BLD AUTO: 27.9 % (ref 35–47)
HGB BLD-MCNC: 9.3 G/DL (ref 11.7–15.7)
HOLD SPECIMEN: NORMAL
IMM GRANULOCYTES # BLD: 0 10E3/UL
IMM GRANULOCYTES NFR BLD: 0 %
INR PPP: 1.27 (ref 0.85–1.15)
LYMPHOCYTES # BLD AUTO: 1.7 10E3/UL (ref 0.8–5.3)
LYMPHOCYTES NFR BLD AUTO: 23 %
MAGNESIUM SERPL-MCNC: 2.2 MG/DL (ref 1.7–2.3)
MCH RBC QN AUTO: 33.3 PG (ref 26.5–33)
MCHC RBC AUTO-ENTMCNC: 33.3 G/DL (ref 31.5–36.5)
MCV RBC AUTO: 100 FL (ref 78–100)
MONOCYTES # BLD AUTO: 0.7 10E3/UL (ref 0–1.3)
MONOCYTES NFR BLD AUTO: 10 %
NEUTROPHILS # BLD AUTO: 5 10E3/UL (ref 1.6–8.3)
NEUTROPHILS NFR BLD AUTO: 66 %
NRBC # BLD AUTO: 0 10E3/UL
NRBC BLD AUTO-RTO: 0 /100
NT-PROBNP SERPL-MCNC: 873 PG/ML (ref 0–1800)
PLATELET # BLD AUTO: 150 10E3/UL (ref 150–450)
POTASSIUM SERPL-SCNC: 3.8 MMOL/L (ref 3.4–5.3)
PROT SERPL-MCNC: 6 G/DL (ref 6.4–8.3)
RBC # BLD AUTO: 2.79 10E6/UL (ref 3.8–5.2)
SODIUM SERPL-SCNC: 138 MMOL/L (ref 135–145)
TROPONIN T SERPL HS-MCNC: 23 NG/L
WBC # BLD AUTO: 7.6 10E3/UL (ref 4–11)

## 2024-04-21 PROCEDURE — 71045 X-RAY EXAM CHEST 1 VIEW: CPT

## 2024-04-21 PROCEDURE — 36415 COLL VENOUS BLD VENIPUNCTURE: CPT | Performed by: EMERGENCY MEDICINE

## 2024-04-21 PROCEDURE — 85025 COMPLETE CBC W/AUTO DIFF WBC: CPT | Performed by: EMERGENCY MEDICINE

## 2024-04-21 PROCEDURE — 84484 ASSAY OF TROPONIN QUANT: CPT | Performed by: EMERGENCY MEDICINE

## 2024-04-21 PROCEDURE — 85610 PROTHROMBIN TIME: CPT | Performed by: EMERGENCY MEDICINE

## 2024-04-21 PROCEDURE — 80053 COMPREHEN METABOLIC PANEL: CPT | Performed by: EMERGENCY MEDICINE

## 2024-04-21 PROCEDURE — 99285 EMERGENCY DEPT VISIT HI MDM: CPT | Mod: 25

## 2024-04-21 PROCEDURE — 83880 ASSAY OF NATRIURETIC PEPTIDE: CPT | Performed by: EMERGENCY MEDICINE

## 2024-04-21 PROCEDURE — 83735 ASSAY OF MAGNESIUM: CPT | Performed by: EMERGENCY MEDICINE

## 2024-04-21 PROCEDURE — 93005 ELECTROCARDIOGRAM TRACING: CPT | Performed by: EMERGENCY MEDICINE

## 2024-04-21 ASSESSMENT — COLUMBIA-SUICIDE SEVERITY RATING SCALE - C-SSRS
6. HAVE YOU EVER DONE ANYTHING, STARTED TO DO ANYTHING, OR PREPARED TO DO ANYTHING TO END YOUR LIFE?: NO
2. HAVE YOU ACTUALLY HAD ANY THOUGHTS OF KILLING YOURSELF IN THE PAST MONTH?: NO
1. IN THE PAST MONTH, HAVE YOU WISHED YOU WERE DEAD OR WISHED YOU COULD GO TO SLEEP AND NOT WAKE UP?: NO

## 2024-04-21 ASSESSMENT — ACTIVITIES OF DAILY LIVING (ADL)
ADLS_ACUITY_SCORE: 38
ADLS_ACUITY_SCORE: 38

## 2024-04-22 ENCOUNTER — APPOINTMENT (OUTPATIENT)
Dept: CARDIOLOGY | Facility: CLINIC | Age: 84
DRG: 641 | End: 2024-04-22
Attending: INTERNAL MEDICINE
Payer: MEDICARE

## 2024-04-22 PROBLEM — R09.02 HYPOXEMIA: Status: ACTIVE | Noted: 2024-04-22

## 2024-04-22 PROBLEM — I50.9 ACUTE ON CHRONIC CONGESTIVE HEART FAILURE, UNSPECIFIED HEART FAILURE TYPE (H): Status: ACTIVE | Noted: 2024-04-22

## 2024-04-22 LAB
ALBUMIN SERPL BCG-MCNC: 4 G/DL (ref 3.5–5.2)
ALBUMIN UR-MCNC: NEGATIVE MG/DL
ALP SERPL-CCNC: 64 U/L (ref 40–150)
ALT SERPL W P-5'-P-CCNC: <5 U/L (ref 0–50)
ANION GAP SERPL CALCULATED.3IONS-SCNC: 12 MMOL/L (ref 7–15)
APPEARANCE UR: CLEAR
AST SERPL W P-5'-P-CCNC: 20 U/L (ref 0–45)
BASOPHILS # BLD AUTO: 0 10E3/UL (ref 0–0.2)
BASOPHILS NFR BLD AUTO: 0 %
BILIRUB SERPL-MCNC: 0.4 MG/DL
BILIRUB UR QL STRIP: NEGATIVE
BUN SERPL-MCNC: 26.4 MG/DL (ref 8–23)
CALCIUM SERPL-MCNC: 9.4 MG/DL (ref 8.8–10.2)
CHLORIDE SERPL-SCNC: 100 MMOL/L (ref 98–107)
COLOR UR AUTO: COLORLESS
CREAT SERPL-MCNC: 1.7 MG/DL (ref 0.51–0.95)
DEPRECATED HCO3 PLAS-SCNC: 28 MMOL/L (ref 22–29)
EGFRCR SERPLBLD CKD-EPI 2021: 30 ML/MIN/1.73M2
EOSINOPHIL # BLD AUTO: 0.1 10E3/UL (ref 0–0.7)
EOSINOPHIL NFR BLD AUTO: 2 %
ERYTHROCYTE [DISTWIDTH] IN BLOOD BY AUTOMATED COUNT: 15.9 % (ref 10–15)
FLUAV RNA SPEC QL NAA+PROBE: NEGATIVE
FLUBV RNA RESP QL NAA+PROBE: NEGATIVE
GLUCOSE SERPL-MCNC: 89 MG/DL (ref 70–99)
GLUCOSE UR STRIP-MCNC: NEGATIVE MG/DL
HCT VFR BLD AUTO: 29.5 % (ref 35–47)
HGB BLD-MCNC: 9.8 G/DL (ref 11.7–15.7)
HGB UR QL STRIP: NEGATIVE
IMM GRANULOCYTES # BLD: 0 10E3/UL
IMM GRANULOCYTES NFR BLD: 0 %
KETONES UR STRIP-MCNC: NEGATIVE MG/DL
LACTATE SERPL-SCNC: 1.9 MMOL/L (ref 0.7–2)
LEUKOCYTE ESTERASE UR QL STRIP: NEGATIVE
LVEF ECHO: NORMAL
LYMPHOCYTES # BLD AUTO: 1.9 10E3/UL (ref 0.8–5.3)
LYMPHOCYTES NFR BLD AUTO: 24 %
MCH RBC QN AUTO: 33 PG (ref 26.5–33)
MCHC RBC AUTO-ENTMCNC: 33.2 G/DL (ref 31.5–36.5)
MCV RBC AUTO: 99 FL (ref 78–100)
MONOCYTES # BLD AUTO: 0.8 10E3/UL (ref 0–1.3)
MONOCYTES NFR BLD AUTO: 10 %
NEUTROPHILS # BLD AUTO: 4.9 10E3/UL (ref 1.6–8.3)
NEUTROPHILS NFR BLD AUTO: 63 %
NITRATE UR QL: NEGATIVE
NRBC # BLD AUTO: 0 10E3/UL
NRBC BLD AUTO-RTO: 0 /100
PH UR STRIP: 7 [PH] (ref 5–7)
PLATELET # BLD AUTO: 160 10E3/UL (ref 150–450)
POTASSIUM SERPL-SCNC: 3.6 MMOL/L (ref 3.4–5.3)
PROCALCITONIN SERPL IA-MCNC: 0.09 NG/ML
PROT SERPL-MCNC: 6.7 G/DL (ref 6.4–8.3)
RBC # BLD AUTO: 2.97 10E6/UL (ref 3.8–5.2)
RBC URINE: 1 /HPF
RSV RNA SPEC NAA+PROBE: NEGATIVE
SARS-COV-2 RNA RESP QL NAA+PROBE: NEGATIVE
SODIUM SERPL-SCNC: 140 MMOL/L (ref 135–145)
SP GR UR STRIP: 1.01 (ref 1–1.03)
TROPONIN T SERPL HS-MCNC: 22 NG/L
UROBILINOGEN UR STRIP-MCNC: <2 MG/DL
WBC # BLD AUTO: 7.8 10E3/UL (ref 4–11)
WBC URINE: <1 /HPF

## 2024-04-22 PROCEDURE — 93306 TTE W/DOPPLER COMPLETE: CPT | Mod: 26 | Performed by: GENERAL ACUTE CARE HOSPITAL

## 2024-04-22 PROCEDURE — 99223 1ST HOSP IP/OBS HIGH 75: CPT | Mod: AI | Performed by: INTERNAL MEDICINE

## 2024-04-22 PROCEDURE — 250N000013 HC RX MED GY IP 250 OP 250 PS 637: Performed by: INTERNAL MEDICINE

## 2024-04-22 PROCEDURE — 250N000011 HC RX IP 250 OP 636: Performed by: EMERGENCY MEDICINE

## 2024-04-22 PROCEDURE — G0378 HOSPITAL OBSERVATION PER HR: HCPCS

## 2024-04-22 PROCEDURE — 255N000002 HC RX 255 OP 636: Performed by: INTERNAL MEDICINE

## 2024-04-22 PROCEDURE — 99207 PR NO BILLABLE SERVICE THIS VISIT: CPT | Performed by: INTERNAL MEDICINE

## 2024-04-22 PROCEDURE — 83605 ASSAY OF LACTIC ACID: CPT | Performed by: INTERNAL MEDICINE

## 2024-04-22 PROCEDURE — 36415 COLL VENOUS BLD VENIPUNCTURE: CPT | Performed by: EMERGENCY MEDICINE

## 2024-04-22 PROCEDURE — 81001 URINALYSIS AUTO W/SCOPE: CPT | Performed by: INTERNAL MEDICINE

## 2024-04-22 PROCEDURE — 96374 THER/PROPH/DIAG INJ IV PUSH: CPT

## 2024-04-22 PROCEDURE — 85025 COMPLETE CBC W/AUTO DIFF WBC: CPT | Performed by: INTERNAL MEDICINE

## 2024-04-22 PROCEDURE — 84484 ASSAY OF TROPONIN QUANT: CPT | Performed by: EMERGENCY MEDICINE

## 2024-04-22 PROCEDURE — 36415 COLL VENOUS BLD VENIPUNCTURE: CPT | Performed by: INTERNAL MEDICINE

## 2024-04-22 PROCEDURE — 96376 TX/PRO/DX INJ SAME DRUG ADON: CPT

## 2024-04-22 PROCEDURE — 87637 SARSCOV2&INF A&B&RSV AMP PRB: CPT | Performed by: INTERNAL MEDICINE

## 2024-04-22 PROCEDURE — 85045 AUTOMATED RETICULOCYTE COUNT: CPT | Performed by: NURSE PRACTITIONER

## 2024-04-22 PROCEDURE — 99222 1ST HOSP IP/OBS MODERATE 55: CPT | Performed by: INTERNAL MEDICINE

## 2024-04-22 PROCEDURE — 84145 PROCALCITONIN (PCT): CPT | Performed by: INTERNAL MEDICINE

## 2024-04-22 PROCEDURE — 250N000011 HC RX IP 250 OP 636: Performed by: INTERNAL MEDICINE

## 2024-04-22 PROCEDURE — 250N000013 HC RX MED GY IP 250 OP 250 PS 637: Performed by: HOSPITALIST

## 2024-04-22 PROCEDURE — 80053 COMPREHEN METABOLIC PANEL: CPT | Performed by: INTERNAL MEDICINE

## 2024-04-22 RX ORDER — OLANZAPINE 5 MG/1
15 TABLET ORAL AT BEDTIME
Status: DISCONTINUED | OUTPATIENT
Start: 2024-04-22 | End: 2024-04-26 | Stop reason: HOSPADM

## 2024-04-22 RX ORDER — DIVALPROEX SODIUM 250 MG/1
500 TABLET, DELAYED RELEASE ORAL 2 TIMES DAILY
Status: DISCONTINUED | OUTPATIENT
Start: 2024-04-22 | End: 2024-04-26 | Stop reason: HOSPADM

## 2024-04-22 RX ORDER — CARVEDILOL 3.12 MG/1
3.12 TABLET ORAL 2 TIMES DAILY
Status: DISCONTINUED | OUTPATIENT
Start: 2024-04-22 | End: 2024-04-26 | Stop reason: HOSPADM

## 2024-04-22 RX ORDER — ALBUTEROL SULFATE 90 UG/1
2 AEROSOL, METERED RESPIRATORY (INHALATION) EVERY 6 HOURS PRN
Status: DISCONTINUED | OUTPATIENT
Start: 2024-04-22 | End: 2024-04-26 | Stop reason: HOSPADM

## 2024-04-22 RX ORDER — AZELASTINE 1 MG/ML
2 SPRAY, METERED NASAL 2 TIMES DAILY
Status: DISCONTINUED | OUTPATIENT
Start: 2024-04-22 | End: 2024-04-26 | Stop reason: HOSPADM

## 2024-04-22 RX ORDER — ACETAMINOPHEN 325 MG/1
975 TABLET ORAL ONCE
Qty: 3 TABLET | Refills: 0 | Status: COMPLETED | OUTPATIENT
Start: 2024-04-22 | End: 2024-04-22

## 2024-04-22 RX ORDER — IPRATROPIUM BROMIDE AND ALBUTEROL SULFATE 2.5; .5 MG/3ML; MG/3ML
1 SOLUTION RESPIRATORY (INHALATION) EVERY 6 HOURS PRN
Status: DISCONTINUED | OUTPATIENT
Start: 2024-04-22 | End: 2024-04-26 | Stop reason: HOSPADM

## 2024-04-22 RX ORDER — FUROSEMIDE 10 MG/ML
40 INJECTION INTRAMUSCULAR; INTRAVENOUS EVERY 12 HOURS
Status: DISCONTINUED | OUTPATIENT
Start: 2024-04-22 | End: 2024-04-23

## 2024-04-22 RX ORDER — ACETAMINOPHEN 325 MG/1
975 TABLET ORAL ONCE
Status: COMPLETED | OUTPATIENT
Start: 2024-04-22 | End: 2024-04-22

## 2024-04-22 RX ORDER — ONDANSETRON 4 MG/1
4 TABLET, ORALLY DISINTEGRATING ORAL EVERY 6 HOURS PRN
Status: DISCONTINUED | OUTPATIENT
Start: 2024-04-22 | End: 2024-04-26 | Stop reason: HOSPADM

## 2024-04-22 RX ORDER — ROSUVASTATIN CALCIUM 10 MG/1
10 TABLET, COATED ORAL DAILY
Status: DISCONTINUED | OUTPATIENT
Start: 2024-04-22 | End: 2024-04-26 | Stop reason: HOSPADM

## 2024-04-22 RX ORDER — CYCLOSPORINE 0.5 MG/ML
1 EMULSION OPHTHALMIC 2 TIMES DAILY
Status: DISCONTINUED | OUTPATIENT
Start: 2024-04-22 | End: 2024-04-26 | Stop reason: HOSPADM

## 2024-04-22 RX ORDER — FUROSEMIDE 10 MG/ML
40 INJECTION INTRAMUSCULAR; INTRAVENOUS ONCE
Status: COMPLETED | OUTPATIENT
Start: 2024-04-22 | End: 2024-04-22

## 2024-04-22 RX ORDER — LEVOTHYROXINE SODIUM 50 UG/1
50 TABLET ORAL DAILY
Status: DISCONTINUED | OUTPATIENT
Start: 2024-04-22 | End: 2024-04-26 | Stop reason: HOSPADM

## 2024-04-22 RX ORDER — ONDANSETRON 2 MG/ML
4 INJECTION INTRAMUSCULAR; INTRAVENOUS EVERY 6 HOURS PRN
Status: DISCONTINUED | OUTPATIENT
Start: 2024-04-22 | End: 2024-04-26 | Stop reason: HOSPADM

## 2024-04-22 RX ORDER — AMOXICILLIN 250 MG
1 CAPSULE ORAL 2 TIMES DAILY PRN
Status: DISCONTINUED | OUTPATIENT
Start: 2024-04-22 | End: 2024-04-26 | Stop reason: HOSPADM

## 2024-04-22 RX ORDER — LEVETIRACETAM 500 MG/1
500 TABLET ORAL 2 TIMES DAILY
Status: DISCONTINUED | OUTPATIENT
Start: 2024-04-22 | End: 2024-04-24

## 2024-04-22 RX ORDER — CARBOXYMETHYLCELLULOSE SODIUM 5 MG/ML
1 SOLUTION/ DROPS OPHTHALMIC 3 TIMES DAILY
Status: DISCONTINUED | OUTPATIENT
Start: 2024-04-22 | End: 2024-04-26 | Stop reason: HOSPADM

## 2024-04-22 RX ORDER — ACETAMINOPHEN 325 MG/1
650 TABLET ORAL EVERY 4 HOURS PRN
Status: DISCONTINUED | OUTPATIENT
Start: 2024-04-22 | End: 2024-04-23

## 2024-04-22 RX ORDER — VALACYCLOVIR HYDROCHLORIDE 500 MG/1
500 TABLET, FILM COATED ORAL DAILY
Status: DISCONTINUED | OUTPATIENT
Start: 2024-04-22 | End: 2024-04-26 | Stop reason: HOSPADM

## 2024-04-22 RX ORDER — ALLOPURINOL 100 MG/1
100 TABLET ORAL 2 TIMES DAILY
Status: DISCONTINUED | OUTPATIENT
Start: 2024-04-22 | End: 2024-04-23

## 2024-04-22 RX ORDER — AMOXICILLIN 250 MG
2 CAPSULE ORAL 2 TIMES DAILY PRN
Status: DISCONTINUED | OUTPATIENT
Start: 2024-04-22 | End: 2024-04-26 | Stop reason: HOSPADM

## 2024-04-22 RX ADMIN — Medication 1 DROP: at 10:19

## 2024-04-22 RX ADMIN — AZELASTINE HYDROCHLORIDE 2 SPRAY: 137 SPRAY, METERED NASAL at 20:52

## 2024-04-22 RX ADMIN — LEVETIRACETAM 500 MG: 500 TABLET, FILM COATED ORAL at 11:08

## 2024-04-22 RX ADMIN — CARVEDILOL 3.12 MG: 3.12 TABLET, FILM COATED ORAL at 10:18

## 2024-04-22 RX ADMIN — ACETAMINOPHEN 650 MG: 325 TABLET ORAL at 22:39

## 2024-04-22 RX ADMIN — ALLOPURINOL 100 MG: 100 TABLET ORAL at 10:18

## 2024-04-22 RX ADMIN — ALLOPURINOL 100 MG: 100 TABLET ORAL at 20:56

## 2024-04-22 RX ADMIN — CYCLOSPORINE 1 DROP: 0.5 EMULSION OPHTHALMIC at 20:54

## 2024-04-22 RX ADMIN — VALACYCLOVIR HYDROCHLORIDE 500 MG: 500 TABLET, FILM COATED ORAL at 10:18

## 2024-04-22 RX ADMIN — APIXABAN 5 MG: 5 TABLET, FILM COATED ORAL at 20:55

## 2024-04-22 RX ADMIN — DIVALPROEX SODIUM 500 MG: 250 TABLET, DELAYED RELEASE ORAL at 10:17

## 2024-04-22 RX ADMIN — ACETAMINOPHEN 975 MG: 325 TABLET ORAL at 14:19

## 2024-04-22 RX ADMIN — PERFLUTREN 3 ML: 6.52 INJECTION, SUSPENSION INTRAVENOUS at 09:45

## 2024-04-22 RX ADMIN — CARVEDILOL 3.12 MG: 3.12 TABLET, FILM COATED ORAL at 20:56

## 2024-04-22 RX ADMIN — DIVALPROEX SODIUM 500 MG: 250 TABLET, DELAYED RELEASE ORAL at 20:55

## 2024-04-22 RX ADMIN — CYCLOSPORINE 1 DROP: 0.5 EMULSION OPHTHALMIC at 10:19

## 2024-04-22 RX ADMIN — FUROSEMIDE 40 MG: 10 INJECTION, SOLUTION INTRAMUSCULAR; INTRAVENOUS at 00:25

## 2024-04-22 RX ADMIN — Medication 1 DROP: at 20:53

## 2024-04-22 RX ADMIN — ROSUVASTATIN CALCIUM 10 MG: 10 TABLET, FILM COATED ORAL at 10:20

## 2024-04-22 RX ADMIN — APIXABAN 5 MG: 5 TABLET, FILM COATED ORAL at 10:53

## 2024-04-22 RX ADMIN — ACETAMINOPHEN 975 MG: 325 TABLET ORAL at 06:12

## 2024-04-22 RX ADMIN — LEVETIRACETAM 500 MG: 500 TABLET, FILM COATED ORAL at 20:56

## 2024-04-22 RX ADMIN — LEVOTHYROXINE SODIUM 50 MCG: 0.05 TABLET ORAL at 10:18

## 2024-04-22 RX ADMIN — FUROSEMIDE 40 MG: 10 INJECTION, SOLUTION INTRAMUSCULAR; INTRAVENOUS at 22:39

## 2024-04-22 RX ADMIN — OLANZAPINE 15 MG: 5 TABLET, FILM COATED ORAL at 21:02

## 2024-04-22 RX ADMIN — FUROSEMIDE 40 MG: 10 INJECTION, SOLUTION INTRAMUSCULAR; INTRAVENOUS at 10:53

## 2024-04-22 RX ADMIN — AZELASTINE HYDROCHLORIDE 2 SPRAY: 137 SPRAY, METERED NASAL at 10:21

## 2024-04-22 ASSESSMENT — ACTIVITIES OF DAILY LIVING (ADL)
ADLS_ACUITY_SCORE: 38
ADLS_ACUITY_SCORE: 38
ADLS_ACUITY_SCORE: 40
ADLS_ACUITY_SCORE: 38
ADLS_ACUITY_SCORE: 40
ADLS_ACUITY_SCORE: 40
ADLS_ACUITY_SCORE: 38
ADLS_ACUITY_SCORE: 40
ADLS_ACUITY_SCORE: 38
ADLS_ACUITY_SCORE: 38
ADLS_ACUITY_SCORE: 40
ADLS_ACUITY_SCORE: 40
ADLS_ACUITY_SCORE: 38
ADLS_ACUITY_SCORE: 38

## 2024-04-22 ASSESSMENT — ENCOUNTER SYMPTOMS
FEVER: 0
COUGH: 0
VOMITING: 0
UNEXPECTED WEIGHT CHANGE: 1
NAUSEA: 0
SHORTNESS OF BREATH: 1

## 2024-04-22 NOTE — ED TRIAGE NOTES
B/B EMS from care center for reported sob and pedal edema, reporting intermittent cp today  Pt reports she is out of her diuretic, not sure when she had last dose     Triage Assessment (Adult)       Row Name 04/21/24 2126          Triage Assessment    Airway WDL WDL        Respiratory WDL    Respiratory WDL X;cough;all     Rhythm/Pattern, Respiratory shortness of breath;unlabored     Expansion/Accessory Muscles/Retractions no use of accessory muscles     Nailbeds no discoloration     Mucous Membranes pink;intact;moist     Cough Frequency frequent     Cough Type dry;nonproductive        Skin Circulation/Temperature WDL    Skin Circulation/Temperature WDL WDL        Cardiac WDL    Cardiac WDL X;chest pain        Chest Pain Assessment    Chest Pain Location midsternal  reported intermitt cp today

## 2024-04-22 NOTE — PHARMACY-ADMISSION MEDICATION HISTORY
Pharmacist Admission Medication History    Admission medication history is complete. The information provided in this note is only as accurate as the sources available at the time of the update.    Information Source(s):  other  via N/A    Pertinent Information:     Patient reportedly resides at [facility].  Came to  with no documents.  Through a series of phone calls, I left a message with DWAIN Wilks [phone] asking to fax a current medication list to [phone]    The following list was compiled through Surescripts and available notes from TCU discharge in April and Acute hospitalization in January    Changes made to PTA medication list:  Added: None  Deleted: None  Changed: None    Allergies reviewed with patient and updates made in EHR: yes    Medication History Completed By: Adryan Callahan RPH 4/22/2024 7:44 AM    Current Facility-Administered Medications for the 4/21/24 encounter (Hospital Encounter)   Medication    denosumab (PROLIA) injection 60 mg    denosumab (PROLIA) injection 60 mg     PTA Med List   Medication Sig Last Dose    acetaminophen (TYLENOL) 500 MG tablet Take 1,000 mg by mouth 3 times daily 4/21/2024    albuterol (PROAIR HFA/PROVENTIL HFA/VENTOLIN HFA) 108 (90 Base) MCG/ACT inhaler Inhale 2 puffs into the lungs every 6 hours (Patient taking differently: Inhale 2 puffs into the lungs every 6 hours as needed for shortness of breath) Unknown    allopurinol (ZYLOPRIM) 100 MG tablet Take 1 tablet (100 mg) by mouth 2 times daily 4/21/2024    apixaban ANTICOAGULANT (ELIQUIS) 5 MG tablet Take 1 tablet (5 mg) by mouth 2 times daily 4/21/2024    Azelastine HCl 137 MCG/SPRAY SOLN Spray 2 sprays in nostril 2 times daily 4/21/2024    carboxymethylcellulose PF (REFRESH PLUS) 0.5 % ophthalmic solution Place 1 drop into both eyes 3 times daily 4/21/2024    carvedilol (COREG) 3.125 MG tablet Take 1 tablet (3.125 mg) by mouth 2 times daily Hold for blood pressure less than 110 4/21/2024     Cholecalciferol (VITAMIN D-3) 125 MCG (5000 UT) TABS Take 5,000 Units by mouth daily 4/21/2024    divalproex sodium delayed-release (DEPAKOTE) 500 MG DR tablet Take 1 tablet (500 mg) by mouth 2 times daily 4/21/2024    ipratropium - albuterol 0.5 mg/2.5 mg/3 mL (DUONEB) 0.5-2.5 (3) MG/3ML neb solution Take 1 vial by nebulization every 6 hours as needed for shortness of breath, wheezing or cough Unknown    levETIRAcetam (KEPPRA) 250 MG tablet Take 1 tablet (250 mg) by mouth 4 times daily (Patient taking differently: Take 500 mg by mouth 2 times daily) 4/21/2024    levothyroxine (SYNTHROID/LEVOTHROID) 50 MCG tablet Take 1 tablet (50 mcg) by mouth daily 4/21/2024    OLANZapine (ZYPREXA) 15 MG tablet Take 1 tablet (15 mg) by mouth At Bedtime 4/21/2024    RESTASIS 0.05 % ophthalmic emulsion PLACE 1 DROP INTO BOTH EYES TWO TIMES A DAY 4/21/2024    rosuvastatin (CRESTOR) 10 MG tablet Take 1 tablet (10 mg) by mouth daily 4/21/2024    torsemide (DEMADEX) 20 MG tablet Take 1 tablet (20 mg) by mouth daily 4/21/2024

## 2024-04-22 NOTE — ED NOTES
Pt placed back on 1.5L oxygen nasal canula at this time. Pt as sleeping and it dropped to 87% RA.   Pt states she uses a cpap machine at home.
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
## 2024-04-22 NOTE — ED NOTES
Per MD straight cath was order for urine retention, straight cath was performed by STEPHANI ramirez and STEPHANI Gore, 1000 ml output.   Perweaek was placed back and bed changed was completed. No other needs at this time.

## 2024-04-22 NOTE — PLAN OF CARE
Pt Aox4. VSS on 1L NC. Denies pain. SR on tele. Monae in place w/ good UOP. Up A1 gbw. Currently denies SOB/CP. PT/OT consult placed. Plan is for IV lasix and overnight O2 study w/ RT. Pt described concerns with having daughter as POA. Care management consult in place. Pt able to make needs known. Call light within reach and purposeful rounding performed. Elicai Hall RN      Problem: Fall Injury Risk  Goal: Absence of Fall and Fall-Related Injury  Outcome: Progressing     Problem: Skin Injury Risk Increased  Goal: Skin Health and Integrity  Outcome: Progressing     Problem: Gas Exchange Impaired  Goal: Optimal Gas Exchange  Outcome: Progressing

## 2024-04-22 NOTE — PROGRESS NOTES
Brief progress note:  Evaluated patient at the bedside at approximately 10:21 AM.  Discussed with bedside nursing.  Patient has experienced urinary retention with over 1000 mL and 450 mL.  Monae catheter was placed.  Lasix was scheduled 40 mg twice daily.  Cardiology to evaluate patient-appreciate recommendations.  Considering nephrology consult pending patient's renal function with diuresis.  ECHO pending.    Caitlyn Gates, DO
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
## 2024-04-22 NOTE — ED NOTES
Pt states that she is from [facility], assisted living. Room 103.  No paper work or medication list came with the pt.  RN attempted to call there and was sent up to 2 different floors and no one knew of this pt.

## 2024-04-22 NOTE — PROGRESS NOTES
PRIMARY DIAGNOSIS: CONGESTIVE HEART FAILURE  OUTPATIENT/OBSERVATION GOALS TO BE MET BEFORE DISCHARGE:  Dyspnea improved and O2 sats >88% at RA or at prior home O2 therapy level:  When pt sleeping, O2 drops to 88-89% on RA.  2L applied and O2 is 98%.        SpO2: 94 %, O2 Device: Nasal cannula  Vitals:    04/21/24 2124   Weight: 83.9 kg (185 lb)        ECHO and other diagnostic testing complete (if applicable): Yes    Return to near baseline physical activity: No    Discharge Planner Nurse   Safe discharge environment identified: Yes  Barriers to discharge: Yes       Entered by: Blossom Epstein RN 04/22/2024 3:20 PM     Pt was having urinary retention and a kimble was placed.
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
## 2024-04-22 NOTE — PROGRESS NOTES
PRIMARY DIAGNOSIS: CONGESTIVE HEART FAILURE  OUTPATIENT/OBSERVATION GOALS TO BE MET BEFORE DISCHARGE:  Dyspnea improved and O2 sats >88% at [facility] or at prior home O2 therapy level: Yes        SpO2: 97 %, O2 Device: Nasal cannula  Vitals:    04/21/24 2124   Weight: 83.9 kg (185 lb)        ECHO and other diagnostic testing complete (if applicable): Yes    Return to near baseline physical activity: No    Discharge Planner Nurse   Safe discharge environment identified: Yes  Barriers to discharge: Yes       Entered by: Blossom Epstein RN 04/22/2024 11:37 AM   Pt is having urinary retention and a kimble cath was placed.

## 2024-04-23 ENCOUNTER — APPOINTMENT (OUTPATIENT)
Dept: ULTRASOUND IMAGING | Facility: CLINIC | Age: 84
DRG: 641 | End: 2024-04-23
Attending: NURSE PRACTITIONER
Payer: MEDICARE

## 2024-04-23 ENCOUNTER — APPOINTMENT (OUTPATIENT)
Dept: PHYSICAL THERAPY | Facility: CLINIC | Age: 84
DRG: 641 | End: 2024-04-23
Attending: INTERNAL MEDICINE
Payer: MEDICARE

## 2024-04-23 ENCOUNTER — APPOINTMENT (OUTPATIENT)
Dept: OCCUPATIONAL THERAPY | Facility: CLINIC | Age: 84
DRG: 641 | End: 2024-04-23
Attending: INTERNAL MEDICINE
Payer: MEDICARE

## 2024-04-23 LAB
ANION GAP SERPL CALCULATED.3IONS-SCNC: 14 MMOL/L (ref 7–15)
BUN SERPL-MCNC: 40.7 MG/DL (ref 8–23)
CALCIUM SERPL-MCNC: 8.9 MG/DL (ref 8.8–10.2)
CHLORIDE SERPL-SCNC: 99 MMOL/L (ref 98–107)
CREAT SERPL-MCNC: 2 MG/DL (ref 0.51–0.95)
DEPRECATED HCO3 PLAS-SCNC: 26 MMOL/L (ref 22–29)
EGFRCR SERPLBLD CKD-EPI 2021: 25 ML/MIN/1.73M2
GLUCOSE SERPL-MCNC: 84 MG/DL (ref 70–99)
IRON BINDING CAPACITY (ROCHE): 289 UG/DL (ref 240–430)
IRON SATN MFR SERPL: 21 % (ref 15–46)
IRON SERPL-MCNC: 62 UG/DL (ref 37–145)
POTASSIUM SERPL-SCNC: 3.5 MMOL/L (ref 3.4–5.3)
RETICS # AUTO: 0.06 10E6/UL (ref 0.03–0.1)
RETICS/RBC NFR AUTO: 1.9 % (ref 0.5–2)
SODIUM SERPL-SCNC: 139 MMOL/L (ref 135–145)

## 2024-04-23 PROCEDURE — 36415 COLL VENOUS BLD VENIPUNCTURE: CPT | Performed by: INTERNAL MEDICINE

## 2024-04-23 PROCEDURE — 94762 N-INVAS EAR/PLS OXIMTRY CONT: CPT

## 2024-04-23 PROCEDURE — 99223 1ST HOSP IP/OBS HIGH 75: CPT | Mod: FS | Performed by: INTERNAL MEDICINE

## 2024-04-23 PROCEDURE — 99232 SBSQ HOSP IP/OBS MODERATE 35: CPT | Performed by: INTERNAL MEDICINE

## 2024-04-23 PROCEDURE — 97535 SELF CARE MNGMENT TRAINING: CPT | Mod: GO

## 2024-04-23 PROCEDURE — 97116 GAIT TRAINING THERAPY: CPT | Mod: GP

## 2024-04-23 PROCEDURE — 97161 PT EVAL LOW COMPLEX 20 MIN: CPT | Mod: GP

## 2024-04-23 PROCEDURE — 999N000157 HC STATISTIC RCP TIME EA 10 MIN

## 2024-04-23 PROCEDURE — 250N000013 HC RX MED GY IP 250 OP 250 PS 637: Performed by: HOSPITALIST

## 2024-04-23 PROCEDURE — 97165 OT EVAL LOW COMPLEX 30 MIN: CPT | Mod: GO

## 2024-04-23 PROCEDURE — 82668 ASSAY OF ERYTHROPOIETIN: CPT | Performed by: NURSE PRACTITIONER

## 2024-04-23 PROCEDURE — G0378 HOSPITAL OBSERVATION PER HR: HCPCS

## 2024-04-23 PROCEDURE — 99207 PR APP CREDIT; MD BILLING SHARED VISIT: CPT | Mod: FS | Performed by: NURSE PRACTITIONER

## 2024-04-23 PROCEDURE — 80048 BASIC METABOLIC PNL TOTAL CA: CPT | Performed by: INTERNAL MEDICINE

## 2024-04-23 PROCEDURE — 76775 US EXAM ABDO BACK WALL LIM: CPT

## 2024-04-23 PROCEDURE — 83550 IRON BINDING TEST: CPT | Performed by: NURSE PRACTITIONER

## 2024-04-23 PROCEDURE — 250N000013 HC RX MED GY IP 250 OP 250 PS 637: Performed by: INTERNAL MEDICINE

## 2024-04-23 PROCEDURE — 76770 US EXAM ABDO BACK WALL COMP: CPT

## 2024-04-23 PROCEDURE — 120N000004 HC R&B MS OVERFLOW

## 2024-04-23 PROCEDURE — 82728 ASSAY OF FERRITIN: CPT | Performed by: NURSE PRACTITIONER

## 2024-04-23 PROCEDURE — 99233 SBSQ HOSP IP/OBS HIGH 50: CPT | Performed by: INTERNAL MEDICINE

## 2024-04-23 RX ORDER — LIDOCAINE 4 G/G
2 PATCH TOPICAL
Status: DISCONTINUED | OUTPATIENT
Start: 2024-04-23 | End: 2024-04-26 | Stop reason: HOSPADM

## 2024-04-23 RX ORDER — ACETAMINOPHEN 325 MG/1
975 TABLET ORAL EVERY 6 HOURS PRN
Status: DISCONTINUED | OUTPATIENT
Start: 2024-04-23 | End: 2024-04-26 | Stop reason: HOSPADM

## 2024-04-23 RX ORDER — ALLOPURINOL 100 MG/1
100 TABLET ORAL DAILY
Status: DISCONTINUED | OUTPATIENT
Start: 2024-04-24 | End: 2024-04-26 | Stop reason: HOSPADM

## 2024-04-23 RX ADMIN — CYCLOSPORINE 1 DROP: 0.5 EMULSION OPHTHALMIC at 20:10

## 2024-04-23 RX ADMIN — ALLOPURINOL 100 MG: 100 TABLET ORAL at 08:15

## 2024-04-23 RX ADMIN — LEVETIRACETAM 500 MG: 500 TABLET, FILM COATED ORAL at 20:06

## 2024-04-23 RX ADMIN — LEVOTHYROXINE SODIUM 50 MCG: 0.05 TABLET ORAL at 08:15

## 2024-04-23 RX ADMIN — Medication 1 DROP: at 20:08

## 2024-04-23 RX ADMIN — DIVALPROEX SODIUM 500 MG: 250 TABLET, DELAYED RELEASE ORAL at 20:07

## 2024-04-23 RX ADMIN — APIXABAN 5 MG: 5 TABLET, FILM COATED ORAL at 08:15

## 2024-04-23 RX ADMIN — CYCLOSPORINE 1 DROP: 0.5 EMULSION OPHTHALMIC at 08:16

## 2024-04-23 RX ADMIN — DIVALPROEX SODIUM 500 MG: 250 TABLET, DELAYED RELEASE ORAL at 08:16

## 2024-04-23 RX ADMIN — AZELASTINE HYDROCHLORIDE 2 SPRAY: 137 SPRAY, METERED NASAL at 08:15

## 2024-04-23 RX ADMIN — LEVETIRACETAM 500 MG: 500 TABLET, FILM COATED ORAL at 08:15

## 2024-04-23 RX ADMIN — OLANZAPINE 15 MG: 5 TABLET, FILM COATED ORAL at 21:59

## 2024-04-23 RX ADMIN — CARVEDILOL 3.12 MG: 3.12 TABLET, FILM COATED ORAL at 20:06

## 2024-04-23 RX ADMIN — ACETAMINOPHEN 975 MG: 325 TABLET ORAL at 14:16

## 2024-04-23 RX ADMIN — Medication 1 DROP: at 14:00

## 2024-04-23 RX ADMIN — ROSUVASTATIN CALCIUM 10 MG: 10 TABLET, FILM COATED ORAL at 08:15

## 2024-04-23 RX ADMIN — ACETAMINOPHEN 650 MG: 325 TABLET ORAL at 05:09

## 2024-04-23 RX ADMIN — AZELASTINE HYDROCHLORIDE 2 SPRAY: 137 SPRAY, METERED NASAL at 20:09

## 2024-04-23 RX ADMIN — VALACYCLOVIR HYDROCHLORIDE 500 MG: 500 TABLET, FILM COATED ORAL at 08:16

## 2024-04-23 RX ADMIN — Medication 1 DROP: at 08:15

## 2024-04-23 RX ADMIN — APIXABAN 5 MG: 5 TABLET, FILM COATED ORAL at 20:06

## 2024-04-23 RX ADMIN — LIDOCAINE 2 PATCH: 4 PATCH TOPICAL at 13:59

## 2024-04-23 ASSESSMENT — ACTIVITIES OF DAILY LIVING (ADL)
ADLS_ACUITY_SCORE: 38
ADLS_ACUITY_SCORE: 38
ADLS_ACUITY_SCORE: 41
ADLS_ACUITY_SCORE: 45
ADLS_ACUITY_SCORE: 45
ADLS_ACUITY_SCORE: 41
ADLS_ACUITY_SCORE: 38
ADLS_ACUITY_SCORE: 45
ADLS_ACUITY_SCORE: 38
ADLS_ACUITY_SCORE: 38
ADLS_ACUITY_SCORE: 41
ADLS_ACUITY_SCORE: 38
ADLS_ACUITY_SCORE: 45
ADLS_ACUITY_SCORE: 38
ADLS_ACUITY_SCORE: 38
ADLS_ACUITY_SCORE: 41
ADLS_ACUITY_SCORE: 45
ADLS_ACUITY_SCORE: 38
ADLS_ACUITY_SCORE: 45
ADLS_ACUITY_SCORE: 38
ADLS_ACUITY_SCORE: 38

## 2024-04-23 NOTE — PLAN OF CARE
"PRIMARY DIAGNOSIS: \"GENERIC\" NURSING  OUTPATIENT/OBSERVATION GOALS TO BE MET BEFORE DISCHARGE:  ADLs back to baseline: No    Activity and level of assistance: Up with standby assistance.    Pain status: Improved with use of alternative comfort measures i.e.: ice and positioning    Return to near baseline physical activity: Yes     Discharge Planner Nurse   Safe discharge environment identified: Yes  Barriers to discharge: Yes        Entered by: Sammie Tyson RN 04/23/2024 3:07 PM     Please review provider order for any additional goals.   Nurse to notify provider when observation goals have been met and patient is ready for discharge.Goal Outcome Evaluation:                        "
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
## 2024-04-23 NOTE — PROGRESS NOTES
PRIMARY DIAGNOSIS: CONGESTIVE HEART FAILURE  OUTPATIENT/OBSERVATION GOALS TO BE MET BEFORE DISCHARGE:  Dyspnea improved and O2 sats >88% at RA or at prior home O2 therapy level:  Pt. Is currently on  1L of o2.        SpO2: 94 %, O2 Device: Nasal cannula  Vitals:    04/21/24 2124 04/22/24 1600 04/23/24 0508   Weight: 83.9 kg (185 lb) 85.6 kg (188 lb 11.2 oz) 85.5 kg (188 lb 9.6 oz)        ECHO and other diagnostic testing complete (if applicable): Yes    Return to near baseline physical activity: No    Discharge Planner Nurse   Safe discharge environment identified: No  Barriers to discharge: Yes. To be seen by Pt and OT in the AM for evals. Care management was consulted for concerns per pt. Patient sleeping between cares.   Pt has been getting 650mg of tylenol overnight for pain but was requesting for something stronger this morning. Overnight provider was notified.        Entered by: Adore Clifton RN 04/23/2024 6:55 AM     Please review provider order for any additional goals.   Nurse to notify provider when observation goals have been met and patient is ready for discharge.

## 2024-04-23 NOTE — CONSULTS
Care Management Initial Consult      General Information  Assessment completed with: Patient, Children, Pt and dtr by phone  Type of CM/SW Visit: Initial Assessment  Primary Care Provider verified and updated as needed: Yes   Readmission within the last 30 days: no previous admission in last 30 days   Reason for Consult: discharge planning, health care directive, transportation  Advance Care Planning: Advance Care Planning Reviewed: present on chart        Communication Assessment  Patient's communication style: spoken language (English or Bilingual)    Hearing Difficulty or Deaf: yes      Cognitive  Cognitive/Neuro/Behavioral: WDL                      Living Environment:   People in home: alone, facility resident     Current living Arrangements: assisted living  Name of Facility: [facility]   Able to return to prior arrangements: other (see comments) (Therapy consults pending)     Family/Social Support:  Care provided by: self, child(leeann), homecare agency, other (see comments) (AL staff assist as designated)  Provides care for: no one, unable/limited ability to care for self  Marital Status:   Children, Facility resident(s)/Staff          Description of Support System: Supportive, Involved    Support Assessment: Caregiver difficulty providing physical care/safety, Caregiver experiencing high stress, Caregiver difficulty providing emotional support    Current Resources:   Patient receiving home care services: Yes ([facility] involved)  Skilled Home Care Services: Physical Therapy, Occupational Therapy (Other disciplines possibly involved)  Community Resources: County Programs, Meals on Wheels  Equipment currently used at home: grab bar, tub/shower, shower chair, walker, rolling  Supplies currently used at home: Hearing Aid Batteries, Incontinence Supplies    Employment/Financial:  Employment Status: retired     Financial Concerns: none   Referral to Financial Worker: No     Does the patient's insurance  plan have a 3 day qualifying hospital stay waiver?  No    Lifestyle & Psychosocial Needs:  Social Determinants of Health     Food Insecurity: No Food Insecurity (1/27/2024)    Received from [employer] [address] [facility], [facility]    Food Insecurity     Worried About Running Out of Food in the Last Year: 1   Depression: Not at risk (11/15/2023)    PHQ-2     PHQ-2 Score: 2   Housing Stability: Low Risk  (1/27/2024)    Received [facility], [facility]    Housing Stability     Unable to Pay for Housing in the Last Year: 1   Tobacco Use: Medium Risk (2/29/2024)    Patient History     Smoking Tobacco Use: Former     Smokeless Tobacco Use: Never     Passive Exposure: Past   Financial Resource Strain: Medium Risk (1/27/2024)    Received from [employer] [address] [facility], [facility]    Financial Resource Strain     Difficulty of Paying Living Expenses: 1     Difficulty of Paying Living Expenses: 2   Alcohol Use: Unknown (3/11/2021)    AUDIT-C     Frequency of Alcohol Consumption: Monthly or less     Average Number of Drinks: Not asked     Frequency of Binge Drinking: Not asked   Transportation Needs: Unmet Transportation Needs (1/27/2024)    [facility] [employer]    Transportation Needs     Lack of Transportation (Medical): 2   Physical Activity: Not on file   Interpersonal Safety: Not on file   Stress: Not on file   Social Connections: Socially Isolated (1/27/2024)    Received from [employer] [facility], [facility]    Social Connections     Frequency of Communication with Friends and Family: 4   Health Literacy: Not on file     Functional Status:  Prior to admission patient needed assistance:   Dependent ADLs::  "Ambulation-walker, Toileting, Bathing  Dependent IADLs:: Cleaning, Shopping, Transportation, Incontinence, Medication Management, Laundry (Pt states independence when at baseline. Daughter states pt needs more assist than she's stating)  Assessment of Functional Status: Not at  functional baseline    Mental Health Status:  Mental Health Status: No Current Concerns       Chemical Dependency Status:  Chemical Dependency Status: No Current Concerns           Values/Beliefs:  Spiritual, Cultural Beliefs, Restorationism Practices, Values that affect care: no (None identified)             Additional Information:  Writer met with pt to introduce Care Management(CM), obtain an initial assessment, discuss discharge, and provide ALEMAN info. Pt resides at the [facility] under AL. Pt has only been there for ~one month. Pt receives assist from facility staff, family, and possibly [facility]. Writer also spoke to pt's daughter Viola LOO(HH) by phone as she is listed as the main contact and also [facility] per documents on file. HH was contacted in order to discover the name of where pt transferred from and obtain additional assessment information. HH states pt has an extensive psych history and has been difficult to coordinate care with at times. Pt did state she was unhappy with decisions made by HH but, also stated no need for a vulnerable adult report or similar. Pt is agreeable to TCU placement if needed. HH would prefer a return to the [facility] with added home care services if needed. Communications with current [facility] initiated - DON is Chantelle Salinasmargueritebaldev @[phone].    4/21 per MERLY Hermosillo-\"83 year old female who presents with shortness of breath. Differential diagnosis includes but not limited to CHF exacerbation, COPD exacerbation, acute coronary syndrome, pneumonia, hypoxia. Does have history of CHF and does take twice daily diuretic.  Patient cannot recall although she is reports that Lasix was not covering it and they " "switched her.  Her chart lists torsemide however she reports she takes it twice a day where this is 1 today.  Patient reports that they may have run out of her medication at least 1 dose tonight but possibly even earlier such as this morning or yesterday because patient's shortness of breath has built up over the course of the day.  Patient does have swelling in her ankles and feelings of bloating which fit with CHF exacerbation.  Patient has crackles bilaterally.  EKG was done which did not show any acute ischemia.  Labs obtained showed equivocal troponin which will be repeated as well as BNP which was within normal limits but slightly higher within those limits.  Chest x-ray was clear though patient still had crackles and lower extremity edema with weight gain which I feel is likely still consistent with CHF especially with the hypoxia to 90% and no history of COPD.  Patient still requiring oxygen here to maintain above 90% and after discussion with patient we will plan to repeat troponin and start diuresis.  Patient appears significantly overloaded but likely would require observation admission for diuresis and reevaluation.  Patient comfortable with this plan and repeat troponin was again equivocal.  Patient discussed with hospitalist for observation admission and diuresis.\"    PT&OT Consults pending. Anticipate improvement and TCU placement if not return to the [facility] - DON is Chantelle Marin @[phone].  to follow and assist with service coordination needs as indicated. Agency transport requested per  - No financial concerns expressed.    Sha No RN      "

## 2024-04-23 NOTE — PROGRESS NOTES
PRIMARY DIAGNOSIS: CONGESTIVE HEART FAILURE  OUTPATIENT/OBSERVATION GOALS TO BE MET BEFORE DISCHARGE:  Dyspnea improved and O2 sats >88% at RA or at prior home O2 therapy level:  Pt. Is currently on  1L of o2.        SpO2: 94 %, O2 Device: Nasal cannula  Vitals:    04/21/24 2124 04/22/24 1600   Weight: 83.9 kg (185 lb) 85.6 kg (188 lb 11.2 oz)        ECHO and other diagnostic testing complete (if applicable): Yes    Return to near baseline physical activity: No    Discharge Planner Nurse   Safe discharge environment identified: No  Barriers to discharge: Yes. To be seen by Pt and OT in the AM for evals. Care management was consulted for concerns per pt. Patient sleeping between cares.        Entered by: Adore Clifton RN 04/23/2024 12:46 AM     Please review provider order for any additional goals.   Nurse to notify provider when observation goals have been met and patient is ready for discharge.

## 2024-04-23 NOTE — PROGRESS NOTES
04/23/24 1000   Appointment Info   Signing Clinician's Name / Credentials (PT) Chadwick Sandra, PT   Quick Adds   Quick Adds Certification   Living Environment   People in Home alone   Current Living Arrangements assisted living   Home Accessibility no concerns   Self-Care   Usual Activity Tolerance moderate   Current Activity Tolerance moderate   Equipment Currently Used at Home walker, rolling   Fall history within last six months yes   Number of times patient has fallen within last six months 6   Activity/Exercise/Self-Care Comment Indep with all ADL's,  does take meals at facility and only paying for med management.   General Information   Onset of Illness/Injury or Date of Surgery 04/21/24   Pertinent History of Current Problem (include personal factors and/or comorbidities that impact the POC) Hypoxemia 4/22/2024    Acute on chronic congestive heart failure, unspecified heart failure type (H) 4/22/2024   Existing Precautions/Restrictions no known precautions/restrictions   Cognition   Affect/Mental Status (Cognition) WFL   Posture    Posture Not impaired   Range of Motion (ROM)   Range of Motion ROM is WFL   Strength (Manual Muscle Testing)   Strength (Manual Muscle Testing) No deficits observed during functional mobility   Transfers   Transfers sit-stand transfer   Sit-Stand Transfer   Sit-Stand [place] (Transfers) contact guard;verbal cues   Assistive Device (Sit-Stand Transfers) walker, front-wheeled   Gait/Stairs (Locomotion)   [place] Level (Gait) contact guard;verbal cues   Assistive Device (Gait) walker, front-wheeled   Distance in Feet (Gait) 10   Pattern (Gait) step-to   Deviations/Abnormal Patterns (Gait) gait speed decreased;yojana decreased;left sided deviations   Balance   Balance other (describe)   Balance Comments L Leg will start shaking and other times has tendency to drag L LE and let walker get too far forward   Coordination   Coordination Comments L LE will shake at times and  not able to control   Clinical Impression   Criteria for Skilled Therapeutic Intervention Yes, treatment indicated   PT Diagnosis (PT) Impaired functional mobility   Influenced by the following impairments weakness, pain   Functional limitations due to impairments transfers, gait   Clinical Presentation (PT Evaluation Complexity) stable   Clinical Presentation Rationale Patient presents as medically diagnosed   Clinical Decision Making (Complexity) low complexity   Planned Therapy Interventions (PT) home exercise program;gait training;stair training;transfer training   Risk & Benefits of therapy have been explained patient   PT Total Evaluation Time   PT Eval, Low Complexity Minutes (19486) 15   Therapy Certification   Start of care date 04/23/24   Certification date from 04/23/24   Certification date to 05/23/24   Physical Therapy Goals   PT Frequency Daily   PT Predicted Duration/Target Date for Goal Attainment 04/30/24   PT Goals Transfers;Gait   PT: Transfers Modified independent;Sit to/from stand;Assistive device   PT: Gait Modified independent;Rolling walker;100 feet   Interventions   Interventions Quick Adds Gait Training   Gait Training   Gait Training Minutes (56894) 10   Symptoms Noted During/After Treatment (Gait Training) fatigue   Treatment Detail/Skilled Intervention Patient able to ambulate with slow stable gait but once patient ambulates past 50' approximately will have her  L LE start gisellekiing.  Paitnet states it has been ongoing issue. Vc to stay in closer to walker and to stop and pause when shaking gets worse.  Walker notably gets too far forward several times even with cueing.   Distance in Feet 80, 100   [place] Level (Gait Training) stand-by assist   Physical Assistance Level (Gait Training) supervision;verbal cues;1 person assist   Weight Bearing (Gait Training) weight-bearing as tolerated   Assistive Device (Gait Training) rolling walker   Gait Analysis Deviations decreased  yojana;decreased step length;decreased toe-to-floor clearance   Impairments (Gait Analysis/Training) pain;motor control impaired;coordination impaired;sensory feedback impaired   PT Discharge Planning   PT Plan progess with gait/transfer/safety/balance as able   PT Discharge Recommendation (DC Rec) home with assist;home with home care physical therapy   PT Rationale for DC Rec Paitent SBA for gait/transfers, has good home setup   PT Brief overview of current status Patient SBA for gait/transferr   PT Equipment Needed at Discharge walker, rolling   Total Session Time   Timed Code Treatment Minutes 10   Total Session Time (sum of timed and untimed services) 25   [facility]  OUTPATIENT PHYSICAL THERAPY EVALUATION  PLAN OF TREATMENT FOR OUTPATIENT REHABILITATION  (COMPLETE FOR INITIAL CLAIMS ONLY)  Patient's Last Name, First Name, M.I.  YOB: 1940  Sandy Spencer                        Provider's Name  [facility] Medical Record No.  [id number]                             Onset Date:  04/21/24   Start of Care Date:  04/23/24   Type:     _X_PT   ___OT   ___SLP Medical Diagnosis:                 PT Diagnosis:  Impaired functional mobility Visits from SOC:  1     See note for plan of treatment, functional goals and certification details    I CERTIFY THE NEED FOR THESE SERVICES FURNISHED UNDER        THIS PLAN OF TREATMENT AND WHILE UNDER MY CARE     (Physician co-signature of this document indicates review and certification of the therapy plan).

## 2024-04-23 NOTE — PROGRESS NOTES
04/23/24 1313   Appointment Info   Signing Clinician's Name / Credentials (OT) Eddie Contreras, OTR/L   Living Environment   People in Home alone   Current Living Arrangements assisted living   Living Environment Comments Pt has FWW, walkin shower w/ grab bars and shower chair, standard toilet   Self-Care   Usual Activity Tolerance moderate   Current Activity Tolerance moderate   Equipment Currently Used at Home grab bar, tub/shower;shower chair;walker, rolling   Fall history within last six months yes   Number of times patient has fallen within last six months 6   Activity/Exercise/Self-Care Comment Pt typically IND w/ ADLs and most IADLs at baseline except for med mgmt, cooking, and driving.   General Information   Onset of Illness/Injury or Date of Surgery 04/21/24   Referring Physician Caitlyn Gates,    Patient/Family Therapy Goal Statement (OT) get stronger   Additional Occupational Profile Info/Pertinent History of Current Problem Sandy Spencer is a 83 year old female with past medical history of ADD, anemia, anxiety, CKD stage III, chronic pain syndrome, chronic pancreatitis, depression, hypothyroidism, PE, PVD status post left CEA, history of nephrectomy, TIA, ulcerative colitis who presented with acute bladder discomfort and urinary retention.  Found to be fluid overloaded.   Existing Precautions/Restrictions fall   Limitations/Impairments safety/cognitive   Cognitive Status Examination   Orientation Status orientation to person, place and time   Affect/Mental Status (Cognitive) confused   Follows Commands follows one-step commands;75-90% accuracy   Sensory   Sensory Quick Adds sensation intact   Pain Assessment   Patient Currently in Pain Yes, see Vital Sign flowsheet   Posture   Posture not impaired   Range of Motion Comprehensive   General Range of Motion no range of motion deficits identified   Strength Comprehensive (MMT)   Comment, General Manual Muscle Testing (MMT) Assessment mild  weakness   Bed Mobility   Bed Mobility scooting/bridging;supine-sit;sit-supine   Comment (Bed Mobility) CGA-Min A   Transfers   Transfers bed-chair transfer;sit-stand transfer;toilet transfer;shower transfer   Transfer Comments CGA   Activities of Daily Living   BADL Assessment/Intervention bathing;toileting   Bathing Assessment/Intervention   [place] Level (Bathing) supervision   Toileting   [place] Level (Toileting) contact guard assist   Clinical Impression   Criteria for Skilled Therapeutic Interventions Met (OT) Yes, treatment indicated   OT Diagnosis decreased ADLs   Influenced by the following impairments hypoxemia, SOB   OT Problem List-Impairments impacting ADL activity tolerance impaired;mobility;strength;cognition;pain   Assessment of Occupational Performance 1-3 Performance Deficits   Identified Performance Deficits dressing, toileting, bathing, all transfers   Planned Therapy Interventions (OT) ADL retraining;bed mobility training;transfer training;strengthening   Clinical Decision Making Complexity (OT) problem focused assessment/low complexity   Risk & Benefits of therapy have been explained evaluation/treatment results reviewed;patient   OT Total Evaluation Time   OT Eval, Low Complexity Minutes (59625) 10   OT Goals   Therapy Frequency (OT) 5 times/week   OT Predicted Duration/Target Date for Goal Attainment 04/29/24   OT Goals Bed Mobility;Transfers;Toilet Transfer/Toileting   OT: Bed Mobility Modified independent;supine to/from sitting;rolling   OT: Transfer Supervision/stand-by assist;with assistive device  (walkin shower w/ grab bars)   OT: Toilet Transfer/Toileting Modified independent;toilet transfer;cleaning and garment management   Interventions   Interventions Quick Adds Self-Care/Home Management   Self-Care/Home Management   Self-Care/Home Mgmt/ADL, Compensatory, Meal Prep Minutes (18000) 12   Symptoms Noted During/After Treatment (Meal Preparation/Planning Training) none    Treatment Detail/Skilled Intervention Pt eating upright in bed upon arrival - agreeable to therapy. Pt cued to transfer to sitting EOB using bedrail - completed w/ CGA and HOB elvated. Pt cued for figure 4 position for LE dressing - demonstrated ability to complete. Pt STS from bed w/ SBA and amb. 20 ft to BR w/ FWW and SBA. Pt stood at sink for g/h tasks ~6 mins - completed oral cares, washed face, brushed hair. Pt instructed on safe walkin shower transfer - pt having difficulty following directions to leave FWW outside of shower to simulate home and needing multiple verbal/tactile cues - pt completed w/ CGA. Pt amb. back to bed. Min A to transfer sit>supine and Max Ax2 to slide to HOB via bedsheet. Alarm on and all needs w/in reach.   OT Discharge Planning   OT Plan SLUMS/ACLS, toilet transfers, UE ex, standing tolerance   OT Discharge Recommendation (DC Rec) (S)  home with home care occupational therapy;home with assist   OT Rationale for DC Rec Pt appears close to baseline - mild difficulty following directions at times. Recommend home care to increase activity tolerance and optimize home environment. will continue to monitor cognition   OT Brief overview of current status CGA transfers, Min A bed mobility   Total Session Time   Timed Code Treatment Minutes 12   Total Session Time (sum of timed and untimed services) 22

## 2024-04-23 NOTE — UTILIZATION REVIEW
Inpatient appropriate    Admission Status; Secondary Review Determination       Under the authority of the Utilization Management Committee, the utilization review process indicated a secondary review on the above patient. The review outcome is based on review of the medical records, discussions with staff, and applying clinical experience noted on the date of the review.     (x) Inpatient Status Appropriate - This patient's medical care is consistent with medical management for inpatient care and reasonable inpatient medical practice.     RATIONALE FOR DETERMINATION   83 year old female with past medical history of ADD, anemia, anxiety, CKD stage III, chronic pain syndrome, chronic pancreatitis, depression, hypothyroidism, PE, PVD status post left CEA, history of nephrectomy, TIA, ulcerative colitis who presented with acute bladder discomfort and urinary retention.  Also found to have acute heart failure.  Monae catheter placed for urinary retention.  Currently on IV Lasix for heart failure with volume overload.    At the time of admission with the information available to the attending physician more than 2 nights Hospital complex care was anticipated, based on patient risk of adverse outcome if treated as outpatient and complex care required. Inpatient admission is appropriate based on the Medicare guidelines.     The information on this document is developed by the utilization review team in order for the business office to ensure compliance. This only denotes the appropriateness of proper admission status and does not reflect the quality of care rendered.   The definitions of Inpatient Status and Observation Status used in making the determination above are those provided in the CMS Coverage Manual, Chapter 1 and Chapter 6, section 70.4.   Sincerely,   Jr Subramanian MD  Utilization Review  Physician Advisor  [facility].

## 2024-04-23 NOTE — SIGNIFICANT EVENT
Significant Event Note- Remote coverage MD     Time of event: 10:22 PM April 22, 2024    Description of event:  Generalized pain, requesting tylenol     Plan:  -Patient had tylenol listed as allergy ,but tolerated.   -Patient is requesting for tylenol   -PRN tylenol ordered    Discussed with: bedside nurse    Jeb Silva MD

## 2024-04-23 NOTE — PROGRESS NOTES
[facility]    PROGRESS NOTE - Hospitalist Service    ASSESSMENT AND PLAN     Active Problems:    Hypoxemia    Acute on chronic congestive heart failure, unspecified heart failure type (H)    Sandy Spencer is a 83 year old female with past medical history of ADD, anemia, anxiety, CKD stage III, chronic pain syndrome, chronic pancreatitis, depression, hypothyroidism, PE, PVD status post left CEA, history of nephrectomy, TIA, ulcerative colitis who presented with acute bladder discomfort and urinary retention.  Found to be fluid overloaded.    Acute bladder discomfort and urinary retention  Noted to have 1000 mL on bladder scan.  Straight cath completed followed by retention of 400 mL noted later.  Monae catheter placed.  Urinalysis reviewed with no evidence of acute infection  Trial of void likely tomorrow 4/24    Acute heart failure  Acute shortness of breath with fluid overload  Mild troponin elevation likely demand ischemia    - In 2022, she had stress cardiac MRI which is negative for inducible myocardial ischemia.   - ECHO reviewed with EF 65 to 70% and no wall motion abnormalities noted.    - Cardiology evaluated with no plans for intervention currently.  Nocturnal pulse oximetry study basically normal.    Continue IV Lasix 40 mg twice daily-likely transition to p.o. home torsemide tomorrow 4/24.  Appreciate any nephrology recommendations regarding diuresis.      Acute kidney insufficiency on CKD stage III-IV status post nephrectomy  Mild bump in creatinine today.  Appreciate any nephrology recommendations.       Prior 2022 cardiac MR with small pericardial effusion  - ECHO reviewed      Schizoaffective disorder  Zyprexa    History of partial complex seizures continue with her Keppra     Hypothyroidism    HTN and HLD  Carvedilol and rosuvastatin    COPD-no evidence of exacerbation    Pain disorder/reflex sympathetic dystrophy-continue Tylenol.  Patient can follow-up with pain clinic  for further interventions.  Lidocaine patches ordered here    Chronic anemia with hemoglobin of 9.3 -  No obvious signs of bleeding.  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  Friend visiting at bedside.  Patient concerned about her chronic pain issues.  She complains of chronic pain issues to her neck and lower back.  She is followed with an outpatient pain clinic in the past.  I offered lidocaine patches to her which she initially refused but then excepted.  I discussed at length that we would not be providing any opioid pain management for chronic pain issues at this time.    PHYSICAL EXAM  Temp:  [97.2  F (36.2  C)-98.1  F (36.7  C)] 97.4  F (36.3  C)  Pulse:  [68-92] 83  Resp:  [0-32] 20  BP: (104-114)/(50-68) 104/50  SpO2:  [73 %-100 %] 94 %  Wt Readings from Last 1 Encounters:   04/23/24 85.5 kg (188 lb 9.6 oz)       Intake/Output Summary (Last 24 hours) at 4/23/2024 1228  Last data filed at 4/23/2024 0730  Gross per 24 hour   Intake 720 ml   Output 1325 ml   Net -605 ml      Body mass index is 33.41 kg/m .    GENRL: Alert and answering questions appropriately. Not in acute distress. Sitting in a chair   CHEST: Crackles to bilateral bases.  Breathing easily   HEART: Regular rate   EXTRM: trace pedal edema  NEURO: Following commands.  No involuntary movements.   PSYCH: Normal affect and mood.   INTGM: No skin rash    Medical Decision Making       55 MINUTES SPENT BY ME on the date of service doing chart review, history, exam, documentation & further activities  per the note.      PERTINENT LABS/IMAGING:  Results for orders placed or performed during the hospital encounter of 04/21/24   XR Chest Port 1 View    Impression    IMPRESSION: Negative chest.   Echocardiogram Complete   Result Value Ref Range    LVEF  65-70%      Most Recent 3 CBC's:  Recent Labs   Lab Test 04/22/24  0626 04/21/24 2207 03/22/24  1123   WBC 7.8 7.6 5.2   HGB 9.8* 9.3* 9.8*   MCV 99 100 103*    150 154     Most Recent 3 BMP's:  Recent Labs   Lab Test 04/23/24  0419 04/22/24 0626 04/21/24 2207    140 138   POTASSIUM 3.5 3.6 3.8   CHLORIDE 99 100 102   CO2 26 28 25   BUN 40.7* 26.4* 29.3*   CR 2.00* 1.70* 1.84*   ANIONGAP 14 12 11   SRINIVAS 8.9 9.4 8.9   GLC 84 89 100*     Most Recent 2 LFT's:  Recent Labs   Lab Test 04/22/24 0626 04/21/24 2207   AST 20 17   ALT <5 <5   ALKPHOS 64 58   BILITOTAL 0.4 0.3       Recent Labs   Lab Test 03/22/24  1123   CHOL 187   HDL 63   LDL 93   TRIG 156*     Recent Labs   Lab Test 03/22/24  1123 03/20/24  1100 12/01/23  1151   LDL 93 84 123*     Recent Labs   Lab Test 04/23/24 0419      POTASSIUM 3.5   CHLORIDE 99   CO2 26   GLC 84   BUN 40.7*   CR 2.00*   GFRESTIMATED 25*   SRINIVAS 8.9     Recent Labs   Lab Test 02/06/20  1451 01/26/18  1102 07/12/17  1430   A1C 4.9 5.2 5.2     Recent Labs   Lab Test 04/22/24  0626 04/21/24 2207 03/22/24  1123   HGB 9.8* 9.3* 9.8*     Recent Labs   Lab Test 02/05/22  1159 03/15/21  1631 02/09/19  2053   TROPONINI <0.01 <0.01 <0.01     Recent Labs   Lab Test 04/21/24  2207 06/08/22  1451 07/13/21  1455   BNP  --  39  --    NTBNPI 873  --   --    NTBNP  --   --  420     Recent Labs   Lab Test 03/22/24  1123   TSH 3.05     Recent Labs   Lab Test 04/21/24  2207 09/17/21  1527 02/25/21  1415   INR 1.27* 1.17* 1.40*       Caitlyn Gates DO  Hospitalist Service  [facility]

## 2024-04-24 ENCOUNTER — APPOINTMENT (OUTPATIENT)
Dept: OCCUPATIONAL THERAPY | Facility: CLINIC | Age: 84
DRG: 641 | End: 2024-04-24
Payer: MEDICARE

## 2024-04-24 ENCOUNTER — PATIENT OUTREACH (OUTPATIENT)
Dept: CARE COORDINATION | Facility: CLINIC | Age: 84
End: 2024-04-24
Payer: MEDICARE

## 2024-04-24 ENCOUNTER — APPOINTMENT (OUTPATIENT)
Dept: PHYSICAL THERAPY | Facility: CLINIC | Age: 84
DRG: 641 | End: 2024-04-24
Payer: MEDICARE

## 2024-04-24 LAB
ANION GAP SERPL CALCULATED.3IONS-SCNC: 13 MMOL/L (ref 7–15)
BUN SERPL-MCNC: 42.7 MG/DL (ref 8–23)
CALCIUM SERPL-MCNC: 9.1 MG/DL (ref 8.8–10.2)
CHLORIDE SERPL-SCNC: 96 MMOL/L (ref 98–107)
CHOLEST SERPL-MCNC: 156 MG/DL
CREAT SERPL-MCNC: 1.9 MG/DL (ref 0.51–0.95)
DEPRECATED HCO3 PLAS-SCNC: 25 MMOL/L (ref 22–29)
EGFRCR SERPLBLD CKD-EPI 2021: 26 ML/MIN/1.73M2
FERRITIN SERPL-MCNC: 256 NG/ML (ref 11–328)
GLUCOSE SERPL-MCNC: 80 MG/DL (ref 70–99)
HDLC SERPL-MCNC: 69 MG/DL
NONHDLC SERPL-MCNC: 87 MG/DL
POTASSIUM SERPL-SCNC: 3.7 MMOL/L (ref 3.4–5.3)
SODIUM SERPL-SCNC: 134 MMOL/L (ref 135–145)

## 2024-04-24 PROCEDURE — 83718 ASSAY OF LIPOPROTEIN: CPT | Performed by: INTERNAL MEDICINE

## 2024-04-24 PROCEDURE — 120N000004 HC R&B MS OVERFLOW

## 2024-04-24 PROCEDURE — 97535 SELF CARE MNGMENT TRAINING: CPT | Mod: GO

## 2024-04-24 PROCEDURE — 36415 COLL VENOUS BLD VENIPUNCTURE: CPT | Performed by: INTERNAL MEDICINE

## 2024-04-24 PROCEDURE — 82374 ASSAY BLOOD CARBON DIOXIDE: CPT | Performed by: INTERNAL MEDICINE

## 2024-04-24 PROCEDURE — 97110 THERAPEUTIC EXERCISES: CPT | Mod: GO

## 2024-04-24 PROCEDURE — 250N000011 HC RX IP 250 OP 636: Mod: JZ | Performed by: NURSE PRACTITIONER

## 2024-04-24 PROCEDURE — 97116 GAIT TRAINING THERAPY: CPT | Mod: GP

## 2024-04-24 PROCEDURE — 250N000013 HC RX MED GY IP 250 OP 250 PS 637: Performed by: INTERNAL MEDICINE

## 2024-04-24 PROCEDURE — 250N000013 HC RX MED GY IP 250 OP 250 PS 637: Performed by: NURSE PRACTITIONER

## 2024-04-24 PROCEDURE — 97530 THERAPEUTIC ACTIVITIES: CPT | Mod: GP

## 2024-04-24 PROCEDURE — 99232 SBSQ HOSP IP/OBS MODERATE 35: CPT | Performed by: INTERNAL MEDICINE

## 2024-04-24 PROCEDURE — 99232 SBSQ HOSP IP/OBS MODERATE 35: CPT | Performed by: NURSE PRACTITIONER

## 2024-04-24 PROCEDURE — 250N000013 HC RX MED GY IP 250 OP 250 PS 637: Performed by: HOSPITALIST

## 2024-04-24 RX ORDER — TORSEMIDE 20 MG/1
20 TABLET ORAL DAILY
Status: DISCONTINUED | OUTPATIENT
Start: 2024-04-24 | End: 2024-04-26 | Stop reason: HOSPADM

## 2024-04-24 RX ORDER — LEVETIRACETAM 250 MG/1
250 TABLET ORAL 2 TIMES DAILY
DISCHARGE
Start: 2024-04-24

## 2024-04-24 RX ORDER — LEVETIRACETAM 250 MG/1
250 TABLET ORAL 2 TIMES DAILY
Status: DISCONTINUED | OUTPATIENT
Start: 2024-04-24 | End: 2024-04-26 | Stop reason: HOSPADM

## 2024-04-24 RX ADMIN — AZELASTINE HYDROCHLORIDE 2 SPRAY: 137 SPRAY, METERED NASAL at 10:23

## 2024-04-24 RX ADMIN — OLANZAPINE 15 MG: 5 TABLET, FILM COATED ORAL at 20:16

## 2024-04-24 RX ADMIN — DIVALPROEX SODIUM 500 MG: 250 TABLET, DELAYED RELEASE ORAL at 20:16

## 2024-04-24 RX ADMIN — DIVALPROEX SODIUM 500 MG: 250 TABLET, DELAYED RELEASE ORAL at 10:22

## 2024-04-24 RX ADMIN — APIXABAN 5 MG: 5 TABLET, FILM COATED ORAL at 20:16

## 2024-04-24 RX ADMIN — LEVOTHYROXINE SODIUM 50 MCG: 0.05 TABLET ORAL at 10:24

## 2024-04-24 RX ADMIN — CARVEDILOL 3.12 MG: 3.12 TABLET, FILM COATED ORAL at 10:24

## 2024-04-24 RX ADMIN — EPOETIN ALFA-EPBX 10000 UNITS: 10000 INJECTION, SOLUTION INTRAVENOUS; SUBCUTANEOUS at 12:08

## 2024-04-24 RX ADMIN — APIXABAN 5 MG: 5 TABLET, FILM COATED ORAL at 10:23

## 2024-04-24 RX ADMIN — ACETAMINOPHEN 975 MG: 325 TABLET ORAL at 04:55

## 2024-04-24 RX ADMIN — AZELASTINE HYDROCHLORIDE 2 SPRAY: 137 SPRAY, METERED NASAL at 20:17

## 2024-04-24 RX ADMIN — Medication 1 DROP: at 10:23

## 2024-04-24 RX ADMIN — ACETAMINOPHEN 975 MG: 325 TABLET ORAL at 12:13

## 2024-04-24 RX ADMIN — CARVEDILOL 3.12 MG: 3.12 TABLET, FILM COATED ORAL at 20:16

## 2024-04-24 RX ADMIN — Medication 1 DROP: at 20:16

## 2024-04-24 RX ADMIN — LEVETIRACETAM 250 MG: 250 TABLET, FILM COATED ORAL at 12:07

## 2024-04-24 RX ADMIN — CYCLOSPORINE 1 DROP: 0.5 EMULSION OPHTHALMIC at 10:23

## 2024-04-24 RX ADMIN — LEVETIRACETAM 250 MG: 250 TABLET, FILM COATED ORAL at 20:16

## 2024-04-24 RX ADMIN — TORSEMIDE 20 MG: 20 TABLET ORAL at 12:07

## 2024-04-24 RX ADMIN — CYCLOSPORINE 1 DROP: 0.5 EMULSION OPHTHALMIC at 20:18

## 2024-04-24 RX ADMIN — VALACYCLOVIR HYDROCHLORIDE 500 MG: 500 TABLET, FILM COATED ORAL at 10:23

## 2024-04-24 RX ADMIN — ALLOPURINOL 100 MG: 100 TABLET ORAL at 10:23

## 2024-04-24 RX ADMIN — ROSUVASTATIN CALCIUM 10 MG: 10 TABLET, FILM COATED ORAL at 10:24

## 2024-04-24 RX ADMIN — LIDOCAINE 2 PATCH: 4 PATCH TOPICAL at 10:21

## 2024-04-24 ASSESSMENT — ACTIVITIES OF DAILY LIVING (ADL)
DIFFICULTY_COMMUNICATING: NO
TOILETING_ISSUES: YES
DRESSING/BATHING: BATHING DIFFICULTY, ASSISTANCE 1 PERSON
ADLS_ACUITY_SCORE: 45
WALKING_OR_CLIMBING_STAIRS_DIFFICULTY: YES
ADLS_ACUITY_SCORE: 45
ADLS_ACUITY_SCORE: 45
ADLS_ACUITY_SCORE: 39
ADLS_ACUITY_SCORE: 44
ADLS_ACUITY_SCORE: 39
ADLS_ACUITY_SCORE: 43
WALKING_OR_CLIMBING_STAIRS: AMBULATION DIFFICULTY, DEPENDENT
ADLS_ACUITY_SCORE: 39
ADLS_ACUITY_SCORE: 43
WEAR_GLASSES_OR_BLIND: NO
TOILETING_ASSISTANCE: TOILETING DIFFICULTY, REQUIRES EQUIPMENT;TOILETING DIFFICULTY, ASSISTANCE 1 PERSON
ADLS_ACUITY_SCORE: 45
ADLS_ACUITY_SCORE: 45
ADLS_ACUITY_SCORE: 39
EQUIPMENT_CURRENTLY_USED_AT_HOME: WALKER, ROLLING;GRAB BAR, TUB/SHOWER;GRAB BAR, TOILET
DIFFICULTY_EATING/SWALLOWING: NO
ADLS_ACUITY_SCORE: 43
ADLS_ACUITY_SCORE: 45
ADLS_ACUITY_SCORE: 45
ADLS_ACUITY_SCORE: 43
DRESSING/BATHING_DIFFICULTY: YES
ADLS_ACUITY_SCORE: 44
DOING_ERRANDS_INDEPENDENTLY_DIFFICULTY: YES
ADLS_ACUITY_SCORE: 45
ADLS_ACUITY_SCORE: 45
CONCENTRATING,_REMEMBERING_OR_MAKING_DECISIONS_DIFFICULTY: NO
ADLS_ACUITY_SCORE: 43
ADLS_ACUITY_SCORE: 45
ADLS_ACUITY_SCORE: 45
ADLS_ACUITY_SCORE: 39

## 2024-04-24 NOTE — PROGRESS NOTES
Care Management Discharge Note    Discharge Date: 04/24/2024     Discharge Disposition: Assisted Living    Discharge Services: None    Discharge DME: None (No new DME anticipated)    Discharge Transportation:  Agency    Private pay costs discussed: transportation costs    Does the patient's insurance plan have a 3 day qualifying hospital stay waiver?  No    Patient/family educated on Medicare website which has current facility and service quality ratings: no    Education Provided on the Discharge Plan: Yes (Assessment and service coordination process explained to pt and daughter)    Persons Notified of Discharge Plans:  Pt, daughter, bedside RN, charge RN, and [facility] RN    Patient/Family in Agreement with the Plan: yes    Handoff Referral Completed: Yes    Additional Information:  Writer has verified acceptance for return to [facility] with AL RN. Pt has PT&OT recommendations which, are being referred to [facility] per pt/dtr request. No identified barriers to current anticipated discharge disposition. [facility] transport has been arranged for today 4/24 with a pick-up window of 1438hrs-1523hrs.     Update: Pt still has labs pending and has not voided all day since kimble removed. Discharge today has fallen through and will need be revisited on 4/25.    Sha No, RN

## 2024-04-24 NOTE — PLAN OF CARE
Pt alert and oriented and able to verbalize needs. Bed alarm on for safety. SBA with gbw. Pt on 1-2L via NC overnight and on room air once pt awoken. Pt was sound a sleep until after 10am. Monae removed at 1030 and pt has not voided yet. Per order pt will be due to be scanned by 1630 if no void. Pt denies the urge so far. Lidocaine patches and PRN tylenol effective for chronic neck pain and low back pain. Hourly rounding done.   Problem: Fall Injury Risk  Goal: Absence of Fall and Fall-Related Injury  Outcome: Progressing  Intervention: Identify and Manage Contributors  Recent Flowsheet Documentation  Taken 4/24/2024 0830 by Sammie Tyson, RN  Medication Review/Management: medications reviewed  Intervention: Promote Injury-Free Environment  Recent Flowsheet Documentation  Taken 4/24/2024 0830 by Sammie Tyson, RN  Safety Promotion/Fall Prevention:   safety round/check completed   activity supervised   mobility aid in reach   nonskid shoes/slippers when out of bed   patient and family education     Problem: Gas Exchange Impaired  Goal: Optimal Gas Exchange  Outcome: Progressing

## 2024-04-24 NOTE — PROGRESS NOTES
Clinic Care Coordination Contact  Ambulatory Care Coordination to Inpatient Care Management   Hand-In Communication    Date:  April 24, 2024  Name: Sandy Spencer is enrolled in Ambulatory Care Coordination program and I am the Lead Care Coordinator.  CC Contact Information: Epic [employer] + phone  Payor Source: Payor: MEDICARE / Plan: MEDICARE / Product Type: Medicare /   Current services in place:     Please see the CC [place] and Care Management Flowsheets for specific  details of this Sandy Spencer care plan.   Additional details/specific concerns r/t this admission:    Home Safety increasing needs for caregiver.    I will follow this admission in Epic. Please feel free to contact me with questions or for further collaboration in discharge planning.

## 2024-04-24 NOTE — PLAN OF CARE
Pt Aox4. VSS on 1L NC overnight. RA while awake. PRN tylenol utilized for headache. SR on tele. Up A1 gbw to commode. No significant events this shift. Pt able to make needs known. Call light within reach and purposeful rounding performed. Elicia Hall RN      Problem: Fall Injury Risk  Goal: Absence of Fall and Fall-Related Injury  Outcome: Progressing     Problem: Gas Exchange Impaired  Goal: Optimal Gas Exchange  Outcome: Progressing     Problem: Risk for Delirium  Goal: Improved Sleep  Outcome: Progressing

## 2024-04-24 NOTE — PROGRESS NOTES
RENAL PROGRESS NOTE    REASON FOR CONSULT: solitary kidney with MARITA-diuresing      PLAN:  labs still pending from this a.m  Torsemide resumed per [facility] today   Hold parameter on BB, goal MAP >65  Epo 10K today  ferritin pending, iron sat suboptimal, replace if ferritin returns low   Defer decision to void trial to [facility]      ASSESSMENT/PLAN:  MARITA on Chronic kidney disease stage 3b/4 -   History of recurrent AKIs. 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   Historically ~0.5 g proteinuria, paraprotein workup negative.   [place] UA this admittion without RBCs or proteinuria  Baseline  sCR in the 1.6-1.8 range, eGFR in 30-35ml/min range.   and avoidance of NSAIDs.  New onset urine retention over the last few days, now has kimble, prior h/o stones, asymptomatic, L kidney unremarkable by US 4/23     Urine retention:  New finding, reports frequent low volume voids followed by large incont episodes when standing, consider urology eval outpt if fails void trial, currently has kimble      Anemia -   Hemoglobin labile but typically in 10-11 range.  Now 9's  Prior Epo level in 2023 was inappropriately low, retic count low for degree of anemia,  B12 and folate were ok in the past, iron saturation borderline. She is not yet on JERZY therapy,  initiating today      Volume -   Imaging and exam not suggestive of volume surplus, diuretics resumed  today  per [facility]  Stable ECHO     Wt gain:  Notable that pt is about 40lbs heavier than her outpt clinic wt in 7/2023; she reports being very sedentary and no change in eating habits and believes this to be soft tissues gains and not fluid, denies edema/SOB outpt     CEVALLOS:  BNP and trops ok, chest imaging unremarkable, cards suggesting deconditioning as likely etiol, pt is better since admission  ECHO mostly stable (see above)   Cards following      HTN:  BP at bit soft at times SBP 90-120s  (pt reports HoTN PTA and LTC staff had been holding her BB at time). Would Metop be better in this situation?, hold parameters on her BB   ECHO this admission  LVEF 65-70%. Some LVF  mild aortic stenosis is now  present.    ALIYAH:  Uses CPAP at home, req intermittent low dose 02 here      Hyperlipidemia -   On statin     H/o nephrolithiasis -   Follows with [facility]  No acute s/s      CKD/MBD:  On Vit D     Hypothyroidism -   On levothyroxine     H/o gout:  On Allopurinol 100mg BID, favor lower renal dosing, to daily     H/o DVT/PE:  On Eliquis BID    SUBJECTIVE:  feeling tired, denies SOB , still has kimble, denies edema, asking about when she can go home, labs delayed,  uncertain about timing of void trial,     OBJECTIVE:  Physical Exam   Temp: 97.8  F (36.6  C) Temp src: Oral BP: 102/57 Pulse: 81   Resp: 16 SpO2: 96 % O2 Device: Nasal cannula Oxygen Delivery: 1 LPM  Vitals:    24 1600 24 0508 24 0615   Weight: 85.6 kg (188 lb 11.2 oz) 85.5 kg (188 lb 9.6 oz) 85.5 kg (188 lb 7.9 oz)     Vital Signs with Ranges  Temp:  [97.7  F (36.5  C)-98.4  F (36.9  C)] 97.8  F (36.6  C)  Pulse:  [80-81] 81  Resp:  [16-20] 16  BP: ()/(56-66) 102/57  SpO2:  [94 %-96 %] 96 %  I/O last 3 completed shifts:  In: 960 [P.O.:960]  Out: 1025 [Urine:1025]    Temp (24hrs), Av  F (36.7  C), Min:97.7  F (36.5  C), Max:98.4  F (36.9  C)      Patient Vitals for the past 72 hrs:   Weight   24 0615 85.5 kg (188 lb 7.9 oz)   24 0508 85.5 kg (188 lb 9.6 oz)   24 1600 85.6 kg (188 lb 11.2 oz)   24 2124 83.9 kg (185 lb)     Intake/Output Summary (Last 24 hours) at 2024 0930  Last data filed at 2024 0900  Gross per 24 hour   Intake 480 ml   Output 1275 ml   Net -795 ml       PHYSICAL EXAM:  General - Alert and oriented x3, appears comfortable, NAD, tired appearance ; [place]  Cardiovascular - Rate controlled, 's  Respiratory - Clear to auscultation on 1-2L supp 02, sats mid  90's  Abd: BS present, no guarding or pain with palpation, no ascites  Extremities - No obvious lower extremity edema bilaterally  Skin: dry, intact, no rash, good turgor  Neuro:  Grossly intact, no focal deficits  MSK:  Grossly intact bu cursory bed exam   Psych:  flat affect    LABORATORY STUDIES:     Recent Labs   Lab 04/22/24 0626 04/21/24 2207   WBC 7.8 7.6   RBC 2.97* 2.79*   HGB 9.8* 9.3*   HCT 29.5* 27.9*    150       Basic Metabolic Panel:  Recent Labs   Lab 04/23/24  0419 04/22/24 0626 04/21/24 2207    140 138   POTASSIUM 3.5 3.6 3.8   CHLORIDE 99 100 102   CO2 26 28 25   BUN 40.7* 26.4* 29.3*   CR 2.00* 1.70* 1.84*   GLC 84 89 100*   SRINIVAS 8.9 9.4 8.9       INR  Recent Labs   Lab 04/21/24 2207   INR 1.27*        Recent Labs   Lab Test 04/22/24 0626 04/21/24 2207 09/18/21  0708 09/17/21  1527   INR  --  1.27*  --  1.17*   WBC 7.8 7.6   < > 4.7   HGB 9.8* 9.3*   < > 11.5*    150   < > 130*    < > = values in this interval not displayed.       Personally reviewed current labs      AKIL Christianson-BC  [facility]  [phone]

## 2024-04-24 NOTE — LETTER
[facility] CARE COORDINATION  [facility]    April 24, 2024        Sandy Spencer  [address]  [place] [address]          Dear Hao Delgado, I am aware that your mom is in the hospital and I am monitoring for discharge.    Attached is an updated Complex Care Plan for your continued enrollment in Care Coordination. Please let us know if you have additional questions, concerns, or goals that we can assist with.    Sincerely,    Rochelle Weber,   [facility]  [phone]        [facility]  Patient Centered Plan of Care  About Me:        Patient Name:  Sandy Spencer    YOB: 1940  Age:         83 year old   [facility] MRN:    [id number] Telephone Information:  Home Phone [phone]   Mobile [phone]       Address:  [address]  [place] [address] Email address:  [email or web address]      Emergency Contact(s)    Name Relationship Lgl Grd Work Phone Home Phone Mobile Phone   1. ARIANA ROWE Friend    [phone]   2. TREMAINEEUFEMIA* Daughter No  [phone]   3. MARTIN ZENG* Daughter   [phone]    4. BESSIE MOYA Other No  [phone]           Primary language:  English     needed? No   [facility]:  [phone] op. 1  Other communication barriers:Caregiver    Preferred Method of Communication:     Current living arrangement: I live in a private home    Mobility Status/ Medical Equipment: Independent        Health Maintenance  Health Maintenance Reviewed: Due/Overdue   Health Maintenance Due   Topic Date Due    HF ACTION PLAN  Never done    COPD ACTION PLAN  Never done    RSV VACCINE (Pregnancy & 60+) (1 - 1-dose 60+ series) Never done    MEDICARE ANNUAL WELLNESS VISIT  Never done    COVID-19 Vaccine (1 - 2023-24 season) Never done    MICROALBUMIN  03/01/2024           My Access Plan  Medical Emergency 911   Primary Clinic Line [facility] - [phone]   24 Hour Appointment Line [phone] or  [phone]) (toll-free)   24 Hour Nurse Line [phone] (toll-free)   Preferred Urgent Care [facility], [phone]     Preferred Hospital [facility]  [phone]     Preferred Pharmacy [address] [place] [address]     Behavioral Health Crisis Line The National Suicide Prevention Lifeline at 1-589.296.8295 or Text/Call 988           My Care Team Members  Patient Care Team         Relationship Specialty Notifications Start End    Caitlyn Rees MD PCP - General   7/29/15     Phone: [phone] Fax: [phone] [address] [facility] [address]    Caitlyn Rees MD Assigned PCP   6/16/21     Phone: [phone] Fax: [phone] [address] [facility] [address]    Luz Elena Ybarra MD Assigned Heart and Vascular Provider   7/16/21     Phone: [phone] Fax: [phone] [address] [facility] [address]    Magali Garrison, MARIZA Speech Language Pathologist Speech Language/Path  6/27/22     Phone: [phone]          [address] [facility] [address]    Christine Bennett PABenjaminC Referring Physician Neurology  6/27/22     [facility] referral.    Phone: [phone] Fax: [phone]         [address] [facility] [address]    Luz Elena Ybarra MD MD Cardiovascular Disease  2/21/23     Phone: [phone] Fax: [phone]         [address] [facility] [address]    Rudolph Gamez PsyD Assigned Sleep Provider   3/4/23     Phone: [phone] Fax: [phone]         [address] [facility] [address]    Rochelle Weber LSW Lead Care Coordinator Primary Care - CC Admissions 7/19/23     Phone: [phone]         Christine Bennett PA-C Assigned Neuroscience Provider   9/9/23     Phone: [phone] Fax: [phone]         [address] [facility]  [facility] [address]    Tory Saleem APRN CNP Assigned Pulmonology Provider   1/25/24     Phone: [phone] Fax: [phone] [address] [facility] [address] [facility] [address]    Elise Butler, BASIL Assigned MTM Pharmacist   3/15/24     Phone: [phone] Fax: [phone]         MTM [address] [facility] [address]                My Care Plans  Self Management and Treatment Plan    Care Plan  Care Plan: Medication Regimen       Problem: Medication Adherence       Long-Range Goal: I will take my medications as prescribed.       Start Date: 8/1/2023 Expected End Date: 7/31/2024    This Visit's Progress: 60% Recent Progress: 40%    Priority: High    Note:     Barriers: doesn't always take her medications as prescribed.   Strengths: daughter can assist.   Patient expressed understanding of goal: daughter does  Action steps to achieve this goal:  1. Daughter will work with MTM to understand current medication regime.  2. Daughter will work with PCP and pain clinic to see if a psychiatrist is needed.    3. Daughter will report progress towards this goal at scheduled outreach telephone calls from the [facility] team.                               Care Plan: Help At Home       Problem: Insufficient In-home support       Long-Range Goal: I have enough support at home to live independently.       Start Date: 8/1/2023 Expected End Date: 7/31/2024    This Visit's Progress: On Hold Recent Progress: On Hold    Priority: High    Note:     Barriers: patient may not accept.   Strengths: daughter is supportive  Patient expressed understanding of goal: daughter does  Action steps to achieve this goal:  1. Daughter will monitor patient's needs.  2. Daughter will work with care team to get needed services.   3. Daughter will report progress towards this goal at scheduled outreach telephone calls from the [facility] team.  8-9 has a waiver, gets meals                                Advance Care Plans/Directives:   Advanced Care  Plan/Directives on file:   Yes    Status of Document(s): No data recorded  Advanced Care Plan/Directives Type:   Advanced Directive - On File           My Medical and Care Information  Problem List   Patient Active Problem List   Diagnosis    Focal glomerular sclerosis    Complex regional pain syndrome type 1 of both lower extremities    ADD (attention deficit disorder)    RSD upper limb, right    Major depression    HTN (hypertension)    Insomnia    Pure hypercholesterolemia    Right rotator cuff tear    Cluster headaches    Lumbar radiculopathy    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    Temporomandibular joint-pain-dysfunction syndrome (TMJ)    Hypothyroidism    Chronic pain    Snoring    Generalized abdominal pain    Bilateral carotid artery stenosis    Coronary artery disease involving native coronary artery of native heart with angina pectoris (H24)    Other chronic pulmonary embolism without acute cor pulmonale (H)    Hematuria    Hydronephrosis    Bladder spasms    Anxiety disorder due to medical condition    Family relationship problem    History of posttraumatic stress disorder (PTSD)    Generalized muscle weakness    Myofascial pain    Moderate major depression (H)    Elevated lipase    Abdominal bloating    Ampullary stenosis (H28)    Obstruction of biliary tree (H28)    Anemia    Aphthous ulcer of ileum    Disorder of phosphorus metabolism    Edema    Obstruction of common bile duct (H28)    Overflow diarrhea    History of partial colectomy    Reflex sympathetic dystrophy    Solitary left kidney    Flank pain    Hypocitraturia    Primary osteoarthritis of both knees    Iron deficiency anemia    Proteinuria    Concussion with loss of consciousness    Muscle weakness (generalized)    Shuffling gait    Falls frequently    Long term current use of anticoagulant therapy    COPD (chronic obstructive pulmonary disease) (H)    CKD (chronic kidney disease) stage 4, GFR 15-29 ml/min (H)    Cervical disc  disorder with radiculopathy    Derangement of meniscus    Intractable chronic migraine without aura    Occipital neuralgia    Post-traumatic headache    S/p nephrectomy    Sciatic neuropathy    Pain in right knee    Leg pain, bilateral    Cervical radiculopathy    Spinal stenosis of cervical region    Fibrositis of neck    Hypokalemia    Diarrhea, unspecified type    Hypotension due to hypovolemia    Circadian rhythm sleep disorder, delayed sleep phase type    Obstructive sleep apnea (adult) (pediatric)    Pulsatile tinnitus, left ear    Sleep disturbance    Bipolar disorder, current episode manic severe with psychotic features (H)    Hallucinations    Paranoia (H)    Renal calculus, left    Acute cystitis without hematuria    DVT (deep vein thrombosis) in pregnancy    Hyperlipidemia    Hypoxemia    Acute on chronic congestive heart failure, unspecified heart failure type (H)      Current Medications and Allergies:    Current Outpatient Medications   Medication Instructions    acetaminophen (TYLENOL) 1,000 mg, Oral, 3 TIMES DAILY    albuterol (PROAIR HFA/PROVENTIL HFA/VENTOLIN HFA) 108 (90 Base) MCG/ACT inhaler 2 puffs, Inhalation, EVERY 6 HOURS    allopurinol (ZYLOPRIM) 100 mg, Oral, 2 TIMES DAILY    apixaban ANTICOAGULANT (ELIQUIS) 5 mg, Oral, 2 TIMES DAILY    Azelastine HCl 137 MCG/SPRAY SOLN 2 sprays, Nasal, 2 TIMES DAILY    carboxymethylcellulose PF (REFRESH PLUS) 0.5 % ophthalmic solution 1 drop, Both Eyes, 3 TIMES DAILY    carvedilol (COREG) 3.125 mg, Oral, 2 TIMES DAILY, Hold for blood pressure less than 110    divalproex sodium delayed-release (DEPAKOTE) 500 mg, Oral, 2 TIMES DAILY    ipratropium - albuterol 0.5 mg/2.5 mg/3 mL (DUONEB) 0.5-2.5 (3) MG/3ML neb solution 1 vial, Nebulization, EVERY 6 HOURS PRN    levETIRAcetam (KEPPRA) 250 mg, Oral, 4 TIMES DAILY    levothyroxine (SYNTHROID/LEVOTHROID) 50 mcg, Oral, DAILY    OLANZapine (ZYPREXA) 15 mg, Oral, AT BEDTIME    RESTASIS 0.05 % ophthalmic emulsion  PLACE 1 DROP INTO BOTH EYES TWO TIMES A DAY    rosuvastatin (CRESTOR) 10 mg, Oral, DAILY    torsemide (DEMADEX) 20 mg, Oral, DAILY    valACYclovir (VALTREX) 500 mg, Oral, DAILY    Vitamin D-3 5,000 Units, Oral, DAILY         Care Coordination Start Date: 7/18/2023   Frequency of Care Coordination: monthly, more frequently as needed     Form Last Updated: 04/24/2024

## 2024-04-24 NOTE — PROGRESS NOTES
[facility]    PROGRESS NOTE - Hospitalist Service    ASSESSMENT AND PLAN     Active Problems:    Hypoxemia    Acute on chronic congestive heart failure, unspecified heart failure type (H)    Sandy Spencer is a 83 year old female with past medical history of ADD, anemia, anxiety, CKD stage III, chronic pain syndrome, chronic pancreatitis, depression, hypothyroidism, PE, PVD status post left CEA, history of nephrectomy, TIA, ulcerative colitis who presented with acute bladder discomfort and urinary retention.  Found to be fluid overloaded.     Acute bladder discomfort and urinary retention  Noted to have 1000 mL on bladder scan.  Straight cath completed followed by retention of 400 mL noted later.  Kimble catheter placed.  Urinalysis reviewed with no evidence of acute infection  Trial of void 4/24- started at 1030 am- by 3pm has had no urge to urinate.   Plan to follow up bladder scans and resume kimble if needed- possible outpatient urology follow up     Acute heart failure- improved   Acute shortness of breath with fluid overload  Mild troponin elevation likely demand ischemia    - In 2022, she had stress cardiac MRI which is negative for inducible myocardial ischemia.   - ECHO reviewed with EF 65 to 70% and no wall motion abnormalities noted.    - Cardiology evaluated with no plans for intervention currently.  Nocturnal pulse oximetry study basically normal.    Received IV Lasix 40 mg twice daily- transitioned to p.o. home torsemide 4/24.      Acute kidney insufficiency on CKD stage III-IV status post nephrectomy  Mild bump in creatinine today.  Appreciate any nephrology recommendations.       Prior 2022 cardiac MR with small pericardial effusion  - ECHO reviewed     Acute hypoxic episodes  Mainly at night.  Nocturnal oxygen study completed.  Awaiting respiratory read.     Schizoaffective disorder  Zyprexa     History of partial complex seizures continue with her Keppra       Hypothyroidism     HTN and HLD  Carvedilol and rosuvastatin     COPD-no evidence of exacerbation     Pain disorder/reflex sympathetic dystrophy-continue Tylenol.  Patient can follow-up with pain clinic for further interventions.  Lidocaine patches ordered here     Chronic anemia with hemoglobin of 9.3 -  No obvious signs of bleeding.  Likely anemia of chronic renal disease     PE and multiple DVTs of the leg-on Eliquis     Hypothyroidism-levothyroxine     Barriers to discharge: stable creatinine, TOV     Anticipated length of stay: 1 more day     Present on Admission:   Hypoxemia   Acute on chronic congestive heart failure, unspecified heart failure type (H)      Medically Ready for Discharge: Anticipated Tomorrow        Subjective:  Patient resting comfortably in bed.  Trial of void today.  No other complaints.    PHYSICAL EXAM  Temp:  [97.7  F (36.5  C)-98.5  F (36.9  C)] 98.5  F (36.9  C)  Pulse:  [70-81] 80  Resp:  [16-20] 18  BP: (102-114)/(54-58) 111/54  SpO2:  [85 %-96 %] 91 %  Wt Readings from Last 1 Encounters:   04/24/24 85.5 kg (188 lb 7.9 oz)       Intake/Output Summary (Last 24 hours) at 4/24/2024 1455  Last data filed at 4/24/2024 1300  Gross per 24 hour   Intake 600 ml   Output 1075 ml   Net -475 ml      Body mass index is 33.39 kg/m .    GENRL: Alert and answering questions appropriately. Not in acute distress. Sitting in a chair   CHEST: Crackles to bilateral bases.  Breathing easily   HEART: Regular rate   EXTRM: trace pedal edema  NEURO: Following commands.  No involuntary movements.   PSYCH: Normal affect and mood.   INTGM: No skin rash    Medical Decision Making       35 MINUTES SPENT BY ME on the date of service doing chart review, history, exam, documentation & further activities per the note.      PERTINENT LABS/IMAGING:  Results for orders placed or performed during the hospital encounter of 04/21/24   XR Chest Port 1 View    Impression    IMPRESSION: Negative chest.   US Renal Limited     Impression    IMPRESSION:  1.  Unremarkable left kidney. Right nephrectomy.   Echocardiogram Complete   Result Value Ref Range    LVEF  65-70%      Most Recent 3 CBC's:  Recent Labs   Lab Test 04/22/24  0626 04/21/24 2207 03/22/24  1123   WBC 7.8 7.6 5.2   HGB 9.8* 9.3* 9.8*   MCV 99 100 103*    150 154     Most Recent 3 BMP's:  Recent Labs   Lab Test 04/23/24  0419 04/22/24 0626 04/21/24 2207    140 138   POTASSIUM 3.5 3.6 3.8   CHLORIDE 99 100 102   CO2 26 28 25   BUN 40.7* 26.4* 29.3*   CR 2.00* 1.70* 1.84*   ANIONGAP 14 12 11   SRINIVAS 8.9 9.4 8.9   GLC 84 89 100*     Most Recent 2 LFT's:  Recent Labs   Lab Test 04/22/24 0626 04/21/24 2207   AST 20 17   ALT <5 <5   ALKPHOS 64 58   BILITOTAL 0.4 0.3       Recent Labs   Lab Test 03/22/24  1123   CHOL 187   HDL 63   LDL 93   TRIG 156*     Recent Labs   Lab Test 03/22/24  1123 03/20/24  1100 12/01/23  1151   LDL 93 84 123*     Recent Labs   Lab Test 04/23/24 0419      POTASSIUM 3.5   CHLORIDE 99   CO2 26   GLC 84   BUN 40.7*   CR 2.00*   GFRESTIMATED 25*   SRINIVAS 8.9     Recent Labs   Lab Test 02/06/20  1451 01/26/18  1102 07/12/17  1430   A1C 4.9 5.2 5.2     Recent Labs   Lab Test 04/22/24  0626 04/21/24 2207 03/22/24  1123   HGB 9.8* 9.3* 9.8*     Recent Labs   Lab Test 02/05/22  1159 03/15/21  1631 02/09/19  2053   TROPONINI <0.01 <0.01 <0.01     Recent Labs   Lab Test 04/21/24 2207 06/08/22  1451 07/13/21  1455   BNP  --  39  --    NTBNPI 873  --   --    NTBNP  --   --  420     Recent Labs   Lab Test 03/22/24  1123   TSH 3.05     Recent Labs   Lab Test 04/21/24  2207 09/17/21  1527 02/25/21  1415   INR 1.27* 1.17* 1.40*       Caitlyn Gates DO  Hospitalist Service  [facility]

## 2024-04-25 ENCOUNTER — APPOINTMENT (OUTPATIENT)
Dept: PHYSICAL THERAPY | Facility: CLINIC | Age: 84
DRG: 641 | End: 2024-04-25
Payer: MEDICARE

## 2024-04-25 LAB — EPO SERPL-ACNC: 17 MU/ML

## 2024-04-25 PROCEDURE — 99207 PR NO BILLABLE SERVICE THIS VISIT: CPT | Performed by: INTERNAL MEDICINE

## 2024-04-25 PROCEDURE — 97116 GAIT TRAINING THERAPY: CPT | Mod: GP

## 2024-04-25 PROCEDURE — 250N000013 HC RX MED GY IP 250 OP 250 PS 637: Performed by: INTERNAL MEDICINE

## 2024-04-25 PROCEDURE — 99231 SBSQ HOSP IP/OBS SF/LOW 25: CPT | Performed by: INTERNAL MEDICINE

## 2024-04-25 PROCEDURE — 120N000004 HC R&B MS OVERFLOW

## 2024-04-25 PROCEDURE — 250N000013 HC RX MED GY IP 250 OP 250 PS 637: Performed by: NURSE PRACTITIONER

## 2024-04-25 PROCEDURE — 250N000011 HC RX IP 250 OP 636: Performed by: NURSE PRACTITIONER

## 2024-04-25 PROCEDURE — 99232 SBSQ HOSP IP/OBS MODERATE 35: CPT | Performed by: NURSE PRACTITIONER

## 2024-04-25 PROCEDURE — 250N000013 HC RX MED GY IP 250 OP 250 PS 637: Performed by: HOSPITALIST

## 2024-04-25 PROCEDURE — 258N000003 HC RX IP 258 OP 636: Performed by: NURSE PRACTITIONER

## 2024-04-25 RX ORDER — LANOLIN ALCOHOL/MO/W.PET/CERES
3 CREAM (GRAM) TOPICAL
Status: DISCONTINUED | OUTPATIENT
Start: 2024-04-25 | End: 2024-04-26 | Stop reason: HOSPADM

## 2024-04-25 RX ADMIN — Medication 1 DROP: at 20:16

## 2024-04-25 RX ADMIN — LEVETIRACETAM 250 MG: 250 TABLET, FILM COATED ORAL at 20:15

## 2024-04-25 RX ADMIN — LEVETIRACETAM 250 MG: 250 TABLET, FILM COATED ORAL at 09:10

## 2024-04-25 RX ADMIN — AZELASTINE HYDROCHLORIDE 2 SPRAY: 137 SPRAY, METERED NASAL at 20:16

## 2024-04-25 RX ADMIN — IRON SUCROSE 200 MG: 20 INJECTION, SOLUTION INTRAVENOUS at 12:21

## 2024-04-25 RX ADMIN — APIXABAN 5 MG: 5 TABLET, FILM COATED ORAL at 20:16

## 2024-04-25 RX ADMIN — CYCLOSPORINE 1 DROP: 0.5 EMULSION OPHTHALMIC at 09:09

## 2024-04-25 RX ADMIN — CARVEDILOL 3.12 MG: 3.12 TABLET, FILM COATED ORAL at 20:15

## 2024-04-25 RX ADMIN — VALACYCLOVIR HYDROCHLORIDE 500 MG: 500 TABLET, FILM COATED ORAL at 09:10

## 2024-04-25 RX ADMIN — ALLOPURINOL 100 MG: 100 TABLET ORAL at 09:10

## 2024-04-25 RX ADMIN — Medication 3 MG: at 00:32

## 2024-04-25 RX ADMIN — Medication 3 MG: at 22:00

## 2024-04-25 RX ADMIN — ACETAMINOPHEN 975 MG: 325 TABLET ORAL at 01:29

## 2024-04-25 RX ADMIN — AZELASTINE HYDROCHLORIDE 2 SPRAY: 137 SPRAY, METERED NASAL at 09:10

## 2024-04-25 RX ADMIN — CYCLOSPORINE 1 DROP: 0.5 EMULSION OPHTHALMIC at 20:16

## 2024-04-25 RX ADMIN — DIVALPROEX SODIUM 500 MG: 250 TABLET, DELAYED RELEASE ORAL at 20:15

## 2024-04-25 RX ADMIN — LIDOCAINE 2 PATCH: 4 PATCH TOPICAL at 09:09

## 2024-04-25 RX ADMIN — ACETAMINOPHEN 975 MG: 325 TABLET ORAL at 21:59

## 2024-04-25 RX ADMIN — APIXABAN 5 MG: 5 TABLET, FILM COATED ORAL at 09:10

## 2024-04-25 RX ADMIN — TORSEMIDE 20 MG: 20 TABLET ORAL at 09:10

## 2024-04-25 RX ADMIN — Medication 1 DROP: at 09:09

## 2024-04-25 RX ADMIN — LEVOTHYROXINE SODIUM 50 MCG: 0.05 TABLET ORAL at 09:10

## 2024-04-25 RX ADMIN — Medication 1 DROP: at 12:22

## 2024-04-25 RX ADMIN — DIVALPROEX SODIUM 500 MG: 250 TABLET, DELAYED RELEASE ORAL at 09:11

## 2024-04-25 RX ADMIN — CARVEDILOL 3.12 MG: 3.12 TABLET, FILM COATED ORAL at 09:10

## 2024-04-25 RX ADMIN — ROSUVASTATIN CALCIUM 10 MG: 10 TABLET, FILM COATED ORAL at 09:11

## 2024-04-25 RX ADMIN — OLANZAPINE 15 MG: 5 TABLET, FILM COATED ORAL at 20:15

## 2024-04-25 ASSESSMENT — ACTIVITIES OF DAILY LIVING (ADL)
ADLS_ACUITY_SCORE: 43
ADLS_ACUITY_SCORE: 39
ADLS_ACUITY_SCORE: 43
ADLS_ACUITY_SCORE: 39
ADLS_ACUITY_SCORE: 39
ADLS_ACUITY_SCORE: 43
ADLS_ACUITY_SCORE: 39
ADLS_ACUITY_SCORE: 37
ADLS_ACUITY_SCORE: 41
ADLS_ACUITY_SCORE: 43
ADLS_ACUITY_SCORE: 43
ADLS_ACUITY_SCORE: 41
ADLS_ACUITY_SCORE: 43
ADLS_ACUITY_SCORE: 39
ADLS_ACUITY_SCORE: 39
ADLS_ACUITY_SCORE: 43
ADLS_ACUITY_SCORE: 39

## 2024-04-25 NOTE — PROGRESS NOTES
Patient has been assessed for Home Oxygen needs. Oxygen readings:    *Pulse oximetry (SpO2) = 96% on room air at rest while awake.    *SpO2 = 92% on room air during activity/with exercise.  Pt did not require oxygen with walking.   Sammie Tyson RN 4/25/2024 11:56 AM

## 2024-04-25 NOTE — PROVIDER NOTIFICATION
MD notified on patient's back pain and request for more pain medication.     No new orders obtained.

## 2024-04-25 NOTE — PROGRESS NOTES
Care Management Follow Up    Length of Stay (days): 2    Expected Discharge Date: 04/26/2024     Concerns to be Addressed: discharge planning, care coordination/care conferences  PT/OT almaals pending. Pt may need additional home care support at home upon return  Patient plan of care discussed at interdisciplinary rounds: Yes    Anticipated Discharge Disposition: Assisted Living     Anticipated Discharge Services: None  Anticipated Discharge DME: None (No new DME anticipated)    Patient/family educated on Medicare website which has current facility and service quality ratings: no  Education Provided on the Discharge Plan: Yes (Assessment and service coordination process explained to pt and daughter)  Patient/Family in Agreement with the Plan: yes    Referrals Placed by CM/SW: Homecare, Post Acute Facilities (Current VIC and HC vendor communicated with)  Private pay costs discussed: transportation costs    Additional Information:  Pt was to discharge back to [facility] today and more overnight oximetry is needed.  [facility] canceled x.ai w/c ride for today and made it for 1:30 PM tomorrow.  [facility] left VM for  DON is Chantelle Funes @[phone] at [facility] with no return phone call. Yusef Mcadams is main family contact and was updated.     3:30 PM  Christine - Nurse from [facility] called and asked to be called at [phone].  [facility] updated Christine that Pt should be returning tomorrow with x.ai w/c.  CM to update in the morning.      SARTHAK Barakat

## 2024-04-25 NOTE — PROGRESS NOTES
RENAL PROGRESS NOTE    REASON FOR CONSULT: solitary kidney with MARITA-diuresing      PLAN:  Venofer 200mg x 1 today  Ok to discharge from a renal perspective  We can f/up on her anemia, RF, volume and BP in clinic; appt Friday, May 17th at 9 a.m. with ANDREIA Godfrey.        ASSESSMENT/PLAN:  MARITA on Chronic kidney disease stage 3b/4 -   History of recurrent AKIs. 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   Historically ~0.5 g proteinuria, paraprotein workup negative.   [place] [facility] this admittion without RBCs or proteinuria  Baseline  sCR in the 1.6-1.8 range, eGFR in 30-35ml/min range.   and avoidance of NSAIDs.  New onset urine retention over the last few days, now has kimble, prior h/o stones, asymptomatic, L kidney unremarkable by US 4/23     Urine retention:  New finding, reports frequent low volume voids followed by large incont episodes when standing, consider urology eval outpt s/p  void trial     Anemia -   Hemoglobin labile but typically in 10-11 range.  Now 9's  Prior Epo level in 2023 was inappropriately low, retic count low for degree of anemia,  B12 and folate were ok in the past, iron saturation borderlin low, She is not yet on JERZY therapy, 10K 4/24; single dose IV iron, start oral iron if tolerated.      Volume -   Imaging and exam not suggestive of volume surplus, diuretics resumed  today  per [facility]  Stable ECHO     Wt gain:  Notable that pt is about 40lbs heavier than her outpt clinic wt in 7/2023; she reports being very sedentary and no change in eating habits and believes this to be soft tissues gains and not fluid, denies edema/SOB outpt     CEVALLOS:  BNP and trops ok, chest imaging unremarkable, cards suggesting deconditioning as likely etiol, pt is better since admission  ECHO mostly stable (see above)   Cards following      HTN:  BP at bit soft at times SBP 90-120s (pt reports [facility] PTA and LTC  staff had been holding her BB at time). Would Metop be better in this situation?, hold parameters on her BB   ECHO this admission  LVEF 65-70%. Some LVF  mild aortic stenosis is now  present.    ALIYAH:  Uses CPAP at home, req intermittent low dose 02 here      Hyperlipidemia -   On statin     H/o nephrolithiasis -   Follows with [facility]  No acute s/s      CKD/MBD:  On Vit D     Hypothyroidism -   On levothyroxine     H/o gout:  On Allopurinol 100mg BID, favor lower renal dosing, to daily     H/o DVT/PE:  On Eliquis BID    SUBJECTIVE:  feeling tired, denies SOB , passed void trial, working with PT, planning to discharge today, advised renal f/up in the next few weeks as above     OBJECTIVE:  Physical Exam   Temp: 97.5  F (36.4  C) Temp src: Oral BP: 116/56 Pulse: 73   Resp: 18 SpO2: 96 % O2 Device: Nasal cannula Oxygen Delivery: 1 LPM  Vitals:    24 0508 24 0615 24 0100   Weight: 85.5 kg (188 lb 9.6 oz) 85.5 kg (188 lb 7.9 oz) 85.2 kg (187 lb 13.3 oz)     Vital Signs with Ranges  Temp:  [97.4  F (36.3  C)-98.5  F (36.9  C)] 97.5  F (36.4  C)  Pulse:  [65-80] 73  Resp:  [18-20] 18  BP: (102-116)/(54-56) 116/56  SpO2:  [85 %-96 %] 96 %  I/O last 3 completed shifts:  In: 600 [P.O.:600]  Out: 700 [Urine:700]    Temp (24hrs), Av  F (36.7  C), Min:97.7  F (36.5  C), Max:98.4  F (36.9  C)      Patient Vitals for the past 72 hrs:   Weight   24 0100 85.2 kg (187 lb 13.3 oz)   24 0615 85.5 kg (188 lb 7.9 oz)   24 0508 85.5 kg (188 lb 9.6 oz)   24 1600 85.6 kg (188 lb 11.2 oz)     Intake/Output Summary (Last 24 hours) at 2024 0930  Last data filed at 2024 0900  Gross per 24 hour   Intake 480 ml   Output 1275 ml   Net -795 ml       PHYSICAL EXAM:  General - Alert and oriented x3, appears comfortable, NAD, tired appearance ; [place]  Cardiovascular - Rate controlled, 's  Respiratory - Clear to auscultation on 1-2L supp 02, sats mid 90's  Abd: BS present, no  guarding or pain with palpation, no ascites  Extremities - No obvious lower extremity edema bilaterally  Skin: dry, intact, no rash, good turgor  Neuro:  Grossly intact, no focal deficits  MSK:  Grossly intact bu cursory bed exam   Psych:  flat affect    LABORATORY STUDIES:     Recent Labs   Lab 04/22/24 0626 04/21/24 2207   WBC 7.8 7.6   RBC 2.97* 2.79*   HGB 9.8* 9.3*   HCT 29.5* 27.9*    150       Basic Metabolic Panel:  Recent Labs   Lab 04/24/24  0445 04/23/24  0419 04/22/24 0626 04/21/24 2207   * 139 140 138   POTASSIUM 3.7 3.5 3.6 3.8   CHLORIDE 96* 99 100 102   CO2 25 26 28 25   BUN 42.7* 40.7* 26.4* 29.3*   CR 1.90* 2.00* 1.70* 1.84*   GLC 80 84 89 100*   SRINIVAS 9.1 8.9 9.4 8.9       INR  Recent Labs   Lab 04/21/24 2207   INR 1.27*        Recent Labs   Lab Test 04/22/24 0626 04/21/24 2207 09/18/21  0708 09/17/21  1527   INR  --  1.27*  --  1.17*   WBC 7.8 7.6   < > 4.7   HGB 9.8* 9.3*   < > 11.5*    150   < > 130*    < > = values in this interval not displayed.       Personally reviewed current labs      Caitlyn Ramirez, AKILP-BC  [facility]  [phone]

## 2024-04-25 NOTE — DISCHARGE SUMMARY
"[facility]  Hospitalist Discharge Summary      Date of Admission:  4/21/2024  Date of Discharge:  4/26/2024  Discharging Provider: Maisha Gallardo MD  Discharge Service: Hospitalist Service    Discharge Diagnoses   Acute bladder discomfort and urinary retention  Acute shortness of breath with fluid overload, HF ruled out  Acute kidney insufficiency on CKD stage III-IV status post nephrectomy  Schizoaffective disorder  Hypothyroidism  HTN and HLD  Possible ALIYAH  H/O COPD    Clinically Significant Risk Factors     # Obesity: Estimated body mass index is 33.27 kg/m  as calculated from the following:    Height as of this encounter: 1.6 m (5' 3\").    Weight as of this encounter: 85.2 kg (187 lb 13.3 oz).       Follow-ups Needed After Discharge   Follow-up Appointments     Follow Up and recommended labs and tests      Follow up with Nursing home physician.    Follow up with nephrology   Follow up with your cardiologist- ECHO in 2 years        Follow-up and recommended labs and tests       Kidney, anemia, and blood pressure follow-up    Friday, May 17th at 9 a.m. with ANDREIA Godfrey.     Caitlyn Ramirez [facility]  [facility]  [address]  [place] [address]    Office:  [phone]  Fax:  [phone]            With   Discharge Disposition   Discharged to assisted living  Condition at discharge: Stable    Hospital Course   Sandy Spencer is a 83 year old female with past medical history of ADD, anemia, anxiety, CKD stage III, chronic pain syndrome, chronic pancreatitis, depression, hypothyroidism, PE, PVD status post left CEA, history of nephrectomy, TIA, ulcerative colitis who presented with acute bladder discomfort and urinary retention. Found to be fluid overloaded.     Acute bladder discomfort and urinary retention  Noted to have 1000 mL on bladder scan.  Straight cath completed followed by retention of 400 mL noted later.  Monae catheter " placed.  Urinalysis reviewed with no evidence of acute infection  Trial of void 4/24- started, with ability to void spontaneously          Acute shortness of breath with fluid overload  Mild troponin elevation likely demand ischemia    - In 2022, she had stress cardiac MRI which is negative for inducible myocardial ischemia.   - ECHO reviewed with EF 65 to 70% and no wall motion abnormalities noted.    - Cardiology evaluated with no plans for intervention currently.  Nocturnal pulse oximetry study basically normal.    Received IV Lasix 40 mg twice daily- transitioned to p.o. home torsemide 4/24.      Acute kidney insufficiency on CKD stage III-IV status post nephrectomy   Cratinine at baseline (1.69) on discharge      Prior 2022 cardiac MR with small pericardial effusion  - ECHO reviewed donre on 4/22/24. No pericardial effusion noted      Acute hypoxic episodes, secondary to a combination of COPD and possible component of ALIYAH  Mainly at night. Nocturnal oxygen study completed.   Patient to go home with nocturnal Oxygen at 1-2 lts      Schizoaffective disorder  Zyprexa     History of partial complex seizures continue with her Keppra      Hypothyroidism  Resume home keppra      HTN and HLD  Carvedilol and rosuvastatin     COPD-no evidence of exacerbation     Pain disorder/reflex sympathetic dystrophy-continue Tylenol.  Patient can follow-up with pain clinic for further interventions.  Lidocaine patches ordered here     Chronic anemia with hemoglobin of 9.3 -  No obvious signs of bleeding.  Likely anemia of chronic renal disease     PE and multiple DVTs of the leg-on Eliquis                 Consultations This Hospital Stay   CARDIOLOGY IP CONSULT  CARE MANAGEMENT / SOCIAL WORK IP CONSULT  CARE MANAGEMENT / SOCIAL WORK IP CONSULT  PHYSICAL THERAPY ADULT IP CONSULT  OCCUPATIONAL THERAPY ADULT IP CONSULT  NEPHROLOGY IP CONSULT    Code Status   Full Code    Time Spent on this Encounter   Maisha BRANHAM  MD, personally saw the patient today and spent greater than 30 minutes discharging this patient.       Maisha Gallardo MD  [facility]  [address] [address]  Phone: [phone]  Fax: [phone]  ______________________________________________________________________    Physical Exam   Vital Signs: Temp: 97.8  F (36.6  C) Temp src: Oral BP: 119/59 Pulse: 69   Resp: 18 SpO2: 92 % O2 Device: None (Room air)    Weight: 187 lbs 13.31 oz  General Appearance: Awake, alert and not in distress  Respiratory: Clear breath sounds bilaterally   Cardiovascular: Normal heart sounds. No murmurs   GI: Soft, non tender. Normal bowel sounds   Skin: No bruising or bleeding   Other:Awake, alert and orientated X 3          Primary Care Physician   Caitlyn Rees    Discharge Orders      Home Care Referral      Primary Care - Care Coordination Referral      General info for SNF    Length of Stay Estimate: Long Term Care  Condition at Discharge: Stable  Level of care:board and care  Rehabilitation Potential: Good  Admission H&P remains valid and up-to-date: Yes  Recent Chemotherapy: N/A  Use Nursing Home Standing Orders: Yes     Mantoux instructions    Give two-step Mantoux (PPD) Per Facility Policy Yes     Reason for your hospital stay    Acute urinary retention, Acute fluid overload     Follow Up and recommended labs and tests    Follow up with Nursing home physician.    Follow up with nephrology   Follow up with your cardiologist- ECHO in 2 years     Activity - Up with assistive device     Follow-up and recommended labs and tests     Kidney, anemia, and blood pressure follow-up    Friday, May 17th at 9 a.m. with ANDREIA Godfrey.     CYNDI Rincon-BC  [facility]  [address]  [place] [address]    Office:  [phone]  Fax:  [phone]     Oxygen Adult/Peds    Oxygen Documentation  I certify that this patient,  Sandy Spencer has been under my care (or a nurse practitioner or physican's assistant working with me). This is the face-to-face encounter for oxygen medical necessity.      At the time of this encounter, I have reviewed the qualifying testing and have determined that supplemental oxygen is reasonable and necessary and is expected to improve the patient's condition in a home setting.         Patient has continued oxygen desaturation due to Chronic Heart Failure I50  COPD J44.9.    If portability is ordered, is the patient mobile within the home? yes    Was this visit performed as a telehealth visit: No     Diet    Follow this diet upon discharge: Orders Placed This Encounter      2 Gram Sodium Diet           Discharge Medications   Current Discharge Medication List        CONTINUE these medications which have CHANGED    Details   levETIRAcetam (KEPPRA) 250 MG tablet Take 1 tablet (250 mg) by mouth 2 times daily    Associated Diagnoses: Acute on chronic congestive heart failure, unspecified heart failure type (H)           CONTINUE these medications which have NOT CHANGED    Details   acetaminophen (TYLENOL) 500 MG tablet Take 1,000 mg by mouth 3 times daily      albuterol (PROAIR HFA/PROVENTIL HFA/VENTOLIN HFA) 108 (90 Base) MCG/ACT inhaler Inhale 2 puffs into the lungs every 6 hours  Qty: 18 g, Refills: 4    Comments: Pharmacy may dispense brand covered by insurance (Proair, or proventil or ventolin or generic albuterol inhaler)  Associated Diagnoses: Shortness of breath; Post-COVID chronic dyspnea      allopurinol (ZYLOPRIM) 100 MG tablet Take 1 tablet (100 mg) by mouth 2 times daily  Qty: 60 tablet, Refills: 3    Associated Diagnoses: Chronic gout due to renal impairment of multiple sites without tophus      apixaban ANTICOAGULANT (ELIQUIS) 5 MG tablet Take 1 tablet (5 mg) by mouth 2 times daily  Qty: 180 tablet, Refills: 4    Associated Diagnoses: Other chronic pulmonary embolism without acute cor pulmonale  (H)      Azelastine HCl 137 MCG/SPRAY SOLN Spray 2 sprays in nostril 2 times daily      carboxymethylcellulose PF (REFRESH PLUS) 0.5 % ophthalmic solution Place 1 drop into both eyes 3 times daily      carvedilol (COREG) 3.125 MG tablet Take 1 tablet (3.125 mg) by mouth 2 times daily Hold for blood pressure less than 110  Qty: 180 tablet, Refills: 3    Associated Diagnoses: Hypertension secondary to other renal disorders      Cholecalciferol (VITAMIN D-3) 125 MCG (5000 UT) TABS Take 5,000 Units by mouth daily      divalproex sodium delayed-release (DEPAKOTE) 500 MG DR tablet Take 1 tablet (500 mg) by mouth 2 times daily  Qty: 56 tablet, Refills: 1    Associated Diagnoses: Bipolar affective disorder, currently manic, moderate (H)      ipratropium - albuterol 0.5 mg/2.5 mg/3 mL (DUONEB) 0.5-2.5 (3) MG/3ML neb solution Take 1 vial by nebulization every 6 hours as needed for shortness of breath, wheezing or cough      levothyroxine (SYNTHROID/LEVOTHROID) 50 MCG tablet Take 1 tablet (50 mcg) by mouth daily  Qty: 90 tablet, Refills: 3    Associated Diagnoses: Acquired hypothyroidism      OLANZapine (ZYPREXA) 15 MG tablet Take 1 tablet (15 mg) by mouth At Bedtime  Qty: 28 tablet, Refills: 3    Associated Diagnoses: Bipolar affective disorder, currently manic, moderate (H)      RESTASIS 0.05 % ophthalmic emulsion PLACE 1 DROP INTO BOTH EYES TWO TIMES A DAY      rosuvastatin (CRESTOR) 10 MG tablet Take 1 tablet (10 mg) by mouth daily  Qty: 90 tablet, Refills: 1    Associated Diagnoses: Coronary artery disease involving native coronary artery of native heart with angina pectoris (H24)      torsemide (DEMADEX) 20 MG tablet Take 1 tablet (20 mg) by mouth daily  Qty: 30 tablet, Refills: 5    Associated Diagnoses: CKD (chronic kidney disease) stage 4, GFR 15-29 ml/min (H)      valACYclovir (VALTREX) 500 MG tablet Take 1 tablet (500 mg) by mouth daily  Qty: 90 tablet, Refills: 3    Associated Diagnoses: Herpes simplex virus  "infection           Allergies   Allergies   Allergen Reactions    Acamprosate Other (See Comments), Headache and Nausea and Vomiting    Amoxicillin-Pot Clavulanate GI Disturbance    Carbamazepine Other (See Comments)     Patient felt \"drunk\".     Cyclobenzaprine Shortness Of Breath, Other (See Comments) and Unknown     Mouth ulcers and sores per pt      Mirtazapine      Other reaction(s): slowed breathing  Other reaction(s): slowed breathing    Prednisone     Pregabalin Shortness Of Breath, Other (See Comments), Anaphylaxis and Unknown     Throat closes per pt    Other reaction(s): anaphylaxis    Sulfa Antibiotics Shortness Of Breath, Anaphylaxis and Unknown    Sulfamethoxazole-Trimethoprim      Other reaction(s): Respiratory problems, e.g., wheezingDermatological problems, e.g., rash, hives    Zolpidem Other (See Comments)     Sleep walking    Other reaction(s): sleep walking    Amiloride Swelling and Unknown    Amoxicillin Diarrhea, Nausea and Vomiting and Unknown    Aspirin Other (See Comments)     History of gastric ulcers and instructed to not take more than 81 mg/d, 10/5/17 takes 81 mg at home  Stomach       Bupropion Other (See Comments)     Dizziness, confusion, fell 3 times. Mental Confusion.     Chromium Other (See Comments)     Staples: Reaction to all metals.States serious reactions after surgery       Duloxetine Hcl Difficulty breathing    Duloxetine Hcl Other (See Comments)    Nsaids Other (See Comments)     H/o Stomach ulcers      Other Drug Allergy (See Comments)      [place]: turned black into the groin, was told to never have staples again    Oxycodone Headache    Serotonin Other (See Comments)    Sulindac     Tolmetin Other (See Comments) and Unknown     History of ulcer      Verapamil Other (See Comments)     Bradycardia    Valproic Acid Rash     "

## 2024-04-25 NOTE — PLAN OF CARE
Pt alert and oriented and able to verbalize needs. Bed and chair alarms on for safety. Pt ambulating with SBA GB and walker. Home 02 assessment done and pt does not require oxygen at rest or with activity but during hospital stay has required 1-2L overnight. Pt will have an overnight sleep study tonight so she will be able to get oxygen at home for when she's sleeping. VSS. Chronic pain managed with lidoderm patches. Hourly rounding done.   Problem: Fall Injury Risk  Goal: Absence of Fall and Fall-Related Injury  Outcome: Progressing  Intervention: Identify and Manage Contributors  Recent Flowsheet Documentation  Taken 4/25/2024 1630 by Sammie Tyson, RN  Medication Review/Management: medications reviewed  Taken 4/25/2024 0830 by Sammie Tyson, RN  Medication Review/Management: medications reviewed  Intervention: Promote Injury-Free Environment  Recent Flowsheet Documentation  Taken 4/25/2024 1630 by Sammie Tyson, RN  Safety Promotion/Fall Prevention:   safety round/check completed   activity supervised   mobility aid in reach   nonskid shoes/slippers when out of bed   patient and family education  Taken 4/25/2024 0830 by Sammie Tyson, RN  Safety Promotion/Fall Prevention:   safety round/check completed   activity supervised   mobility aid in reach   nonskid shoes/slippers when out of bed   patient and family education     Problem: Gas Exchange Impaired  Goal: Optimal Gas Exchange  Outcome: Progressing    Problem: Skin Injury Risk Increased  Goal: Skin Health and Integrity  Outcome: Adequate for Care Transition     Problem: Heart Failure  Goal: Stable Heart Rate and Rhythm  Outcome: Adequate for Care Transition  Goal: Optimal Functional Ability  Outcome: Adequate for Care Transition  Goal: Fluid and Electrolyte Balance  Outcome: Adequate for Care Transition

## 2024-04-25 NOTE — PLAN OF CARE
Problem: Skin Injury Risk Increased  Goal: Skin Health and Integrity  Outcome: Progressing    Problem: Fall Injury Risk  Goal: Absence of Fall and Fall-Related Injury  Outcome: Progressing    Pt resting comfortably between cares, no events. Voiding spontaneously post-kimble removal. Tele reading NSR. Plan to discharge back to [facility] tomorrow, time TBD. Pt will need home O2 assessment completed prior to discharge.    min fluid

## 2024-04-25 NOTE — PROGRESS NOTES
[facility]    PROGRESS NOTE - Hospitalist Service    ASSESSMENT AND PLAN     Active Problems:    Hypoxemia    Acute on chronic congestive heart failure, unspecified heart failure type (H)    Sandy Spencer is a 83 year old female with past medical history of ADD, anemia, anxiety, CKD stage III, chronic pain syndrome, chronic pancreatitis, depression, hypothyroidism, PE, PVD status post left CEA, history of nephrectomy, TIA, ulcerative colitis who presented with acute bladder discomfort and urinary retention.  Found to be fluid overloaded.     Acute bladder discomfort and urinary retention  Noted to have 1000 mL on bladder scan.  Straight cath completed followed by retention of 400 mL noted later.  Monae catheter placed.  Urinalysis reviewed with no evidence of acute infection  Trial of void 4/24, with ability to void spontaneously        Acute shortness of breath with fluid overload  Mild troponin elevation likely demand ischemia    - In 2022, she had stress cardiac MRI which is negative for inducible myocardial ischemia.   - ECHO reviewed with EF 65 to 70% and no wall motion abnormalities noted.    - Cardiology evaluated with no plans for intervention currently.  Nocturnal pulse oximetry study was done on supplemental Oxygen, showing 1 brief episode of desaturation. Plans to repeat test tonight without Oxygen   Received IV Lasix 40 mg twice daily- transitioned to p.o. home torsemide 4/24.  Per cardiology, pulmonary edema was likely due to deconditioning           Acute kidney insufficiency on CKD stage III-IV status post nephrectomy  Mild bump in creatinine today.  Appreciate any nephrology recommendations.       Prior 2022 cardiac MR with small pericardial effusion  - ECHO reviewed     Acute hypoxic episodes  Mainly at night.  Nocturnal oxygen study completed, but this was done with Oxygen   Ordered enterd in to do the test witjout supplemental O2     Schizoaffective disorder  Zyprexa      History of partial complex seizures continue with her Keppra         HTN and HLD  Carvedilol and rosuvastatin     COPD-no evidence of exacerbation     Pain disorder/reflex sympathetic dystrophy-continue Tylenol.  Patient can follow-up with pain clinic for further interventions.  Lidocaine patches ordered here     Chronic anemia with hemoglobin of 9.3 -  No obvious signs of bleeding.  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 Denies chest pain or SOB  Eating and drinking well  Discussed that she will have to stay another night, as her overnight oximetry test was incomplete       PHYSICAL EXAM  Temp:  [97.4  F (36.3  C)-98.5  F (36.9  C)] 97.4  F (36.3  C)  Pulse:  [65-80] 71  Resp:  [18-20] 18  BP: (102-122)/(54-58) 122/58  SpO2:  [85 %-96 %] 95 %  Wt Readings from Last 1 Encounters:   04/25/24 85.2 kg (187 lb 13.3 oz)       Intake/Output Summary (Last 24 hours) at 4/24/2024 1455  Last data filed at 4/24/2024 1300  Gross per 24 hour   Intake 600 ml   Output 1075 ml   Net -475 ml      Body mass index is 33.27 kg/m .  General Appearance: Awake, alert and not in distress  Respiratory: Clear breath sounds bilaterally   Cardiovascular: Normal heart sounds. No murmurs   GI: Soft, non tender. Normal bowel sounds   Skin: No bruising or bleeding   Other:Awake, alert and orientated X 3       Medical Decision Making       35 MINUTES SPENT BY ME on the date of service doing chart review, history, exam, documentation & further activities per the note.      PERTINENT LABS/IMAGING:  Results for  orders placed or performed during the hospital encounter of 04/21/24   XR Chest Port 1 View    Impression    IMPRESSION: Negative chest.   US Renal Limited    Impression    IMPRESSION:  1.  Unremarkable left kidney. Right nephrectomy.   Echocardiogram Complete   Result Value Ref Range    LVEF  65-70%      Most Recent 3 CBC's:  Recent Labs   Lab Test 04/22/24  0626 04/21/24 2207 03/22/24  1123   WBC 7.8 7.6 5.2   HGB 9.8* 9.3* 9.8*   MCV 99 100 103*    150 154     Most Recent 3 BMP's:  Recent Labs   Lab Test 04/24/24  0445 04/23/24  0419 04/22/24  0626   * 139 140   POTASSIUM 3.7 3.5 3.6   CHLORIDE 96* 99 100   CO2 25 26 28   BUN 42.7* 40.7* 26.4*   CR 1.90* 2.00* 1.70*   ANIONGAP 13 14 12   SRINIVAS 9.1 8.9 9.4   GLC 80 84 89     Most Recent 2 LFT's:  Recent Labs   Lab Test 04/22/24  0626 04/21/24 2207   AST 20 17   ALT <5 <5   ALKPHOS 64 58   BILITOTAL 0.4 0.3       Recent Labs   Lab Test 03/22/24  1123   CHOL 187   HDL 63   LDL 93   TRIG 156*     Recent Labs   Lab Test 03/22/24  1123 03/20/24  1100 12/01/23  1151   LDL 93 84 123*     Recent Labs   Lab Test 04/23/24  0419      POTASSIUM 3.5   CHLORIDE 99   CO2 26   GLC 84   BUN 40.7*   CR 2.00*   GFRESTIMATED 25*   SRINIVAS 8.9     Recent Labs   Lab Test 02/06/20  1451 01/26/18  1102 07/12/17  1430   A1C 4.9 5.2 5.2     Recent Labs   Lab Test 04/22/24  0626 04/21/24 2207 03/22/24  1123   HGB 9.8* 9.3* 9.8*     Recent Labs   Lab Test 02/05/22  1159 03/15/21  1631 02/09/19  2053   TROPONINI <0.01 <0.01 <0.01     Recent Labs   Lab Test 04/21/24  2207 06/08/22  1451 07/13/21  1455   BNP  --  39  --    NTBNPI 873  --   --    NTBNP  --   --  420     Recent Labs   Lab Test 03/22/24  1123   TSH 3.05     Recent Labs   Lab Test 04/21/24  2207 09/17/21  1527 02/25/21  1415   INR 1.27* 1.17* 1.40*       Caitlyn Gates DO  Hospitalist Service  [facility]

## 2024-04-25 NOTE — PLAN OF CARE
Goal Outcome Evaluation:    Patient is alert and oriented x4. VSS on 1L NC. Afebrile. Complains of chronic back pain and PRN tylenol given with some relief. Repositioning as tolerated and warm blankets used. PIV is intact and saline locked. Call light within reach and patient is able to make needs known.     Problem: Adult Inpatient Plan of Care  Goal: Absence of Hospital-Acquired Illness or Injury  Intervention: Identify and Manage Fall Risk  Intervention: Prevent Skin Injury  Intervention: Prevent and Manage VTE (Venous Thromboembolism) Risk  Intervention: Prevent Infection  Goal: Optimal Comfort and Wellbeing  Outcome: Progressing

## 2024-04-25 NOTE — PROGRESS NOTES
Physical Therapy Discharge Summary    Reason for therapy discharge:    Discharged to home with home therapy.    Progress towards therapy goal(s). See goals on Care Plan in [facility] electronic health record for goal details.  Goals partially met.  Barriers to achieving goals:   limited tolerance for therapy.    Therapy recommendation(s):    Continued therapy is recommended.  Rationale/Recommendations:  Patient not at functional mobility baseline.

## 2024-04-26 VITALS
DIASTOLIC BLOOD PRESSURE: 59 MMHG | WEIGHT: 187.83 LBS | TEMPERATURE: 97.8 F | OXYGEN SATURATION: 92 % | BODY MASS INDEX: 33.28 KG/M2 | HEART RATE: 69 BPM | SYSTOLIC BLOOD PRESSURE: 119 MMHG | RESPIRATION RATE: 18 BRPM | HEIGHT: 63 IN

## 2024-04-26 LAB
ANION GAP SERPL CALCULATED.3IONS-SCNC: 9 MMOL/L (ref 7–15)
BUN SERPL-MCNC: 46.3 MG/DL (ref 8–23)
CALCIUM SERPL-MCNC: 9.4 MG/DL (ref 8.8–10.2)
CHLORIDE SERPL-SCNC: 99 MMOL/L (ref 98–107)
CREAT SERPL-MCNC: 1.69 MG/DL (ref 0.51–0.95)
DEPRECATED HCO3 PLAS-SCNC: 31 MMOL/L (ref 22–29)
EGFRCR SERPLBLD CKD-EPI 2021: 30 ML/MIN/1.73M2
GLUCOSE SERPL-MCNC: 107 MG/DL (ref 70–99)
HOLD SPECIMEN: NORMAL
POTASSIUM SERPL-SCNC: 3.6 MMOL/L (ref 3.4–5.3)
SODIUM SERPL-SCNC: 139 MMOL/L (ref 135–145)

## 2024-04-26 PROCEDURE — 99232 SBSQ HOSP IP/OBS MODERATE 35: CPT | Performed by: NURSE PRACTITIONER

## 2024-04-26 PROCEDURE — 250N000013 HC RX MED GY IP 250 OP 250 PS 637: Performed by: INTERNAL MEDICINE

## 2024-04-26 PROCEDURE — 82374 ASSAY BLOOD CARBON DIOXIDE: CPT | Performed by: NURSE PRACTITIONER

## 2024-04-26 PROCEDURE — 250N000013 HC RX MED GY IP 250 OP 250 PS 637: Performed by: NURSE PRACTITIONER

## 2024-04-26 PROCEDURE — 36415 COLL VENOUS BLD VENIPUNCTURE: CPT | Performed by: NURSE PRACTITIONER

## 2024-04-26 PROCEDURE — 94762 N-INVAS EAR/PLS OXIMTRY CONT: CPT

## 2024-04-26 RX ADMIN — TORSEMIDE 20 MG: 20 TABLET ORAL at 08:16

## 2024-04-26 RX ADMIN — LEVOTHYROXINE SODIUM 50 MCG: 0.05 TABLET ORAL at 08:17

## 2024-04-26 RX ADMIN — ALLOPURINOL 100 MG: 100 TABLET ORAL at 08:17

## 2024-04-26 RX ADMIN — LEVETIRACETAM 250 MG: 250 TABLET, FILM COATED ORAL at 08:19

## 2024-04-26 RX ADMIN — DIVALPROEX SODIUM 500 MG: 250 TABLET, DELAYED RELEASE ORAL at 08:19

## 2024-04-26 RX ADMIN — AZELASTINE HYDROCHLORIDE 2 SPRAY: 137 SPRAY, METERED NASAL at 08:21

## 2024-04-26 RX ADMIN — LIDOCAINE 2 PATCH: 4 PATCH TOPICAL at 08:17

## 2024-04-26 RX ADMIN — ROSUVASTATIN CALCIUM 10 MG: 10 TABLET, FILM COATED ORAL at 08:16

## 2024-04-26 RX ADMIN — APIXABAN 5 MG: 5 TABLET, FILM COATED ORAL at 08:17

## 2024-04-26 RX ADMIN — Medication 1 DROP: at 08:17

## 2024-04-26 RX ADMIN — CYCLOSPORINE 1 DROP: 0.5 EMULSION OPHTHALMIC at 08:30

## 2024-04-26 RX ADMIN — VALACYCLOVIR HYDROCHLORIDE 500 MG: 500 TABLET, FILM COATED ORAL at 08:19

## 2024-04-26 RX ADMIN — CARVEDILOL 3.12 MG: 3.12 TABLET, FILM COATED ORAL at 08:17

## 2024-04-26 ASSESSMENT — ACTIVITIES OF DAILY LIVING (ADL)
ADLS_ACUITY_SCORE: 37
ADLS_ACUITY_SCORE: 41
ADLS_ACUITY_SCORE: 37
ADLS_ACUITY_SCORE: 41
ADLS_ACUITY_SCORE: 37
ADLS_ACUITY_SCORE: 41
ADLS_ACUITY_SCORE: 37
ADLS_ACUITY_SCORE: 41

## 2024-04-26 NOTE — PROGRESS NOTES
Occupational Therapy Discharge Summary    Reason for therapy discharge:    Discharged to home with home therapy.    Progress towards therapy goal(s). See goals on Care Plan in [facility] electronic health record for goal details.  Goals partially met.  Barriers to achieving goals:   discharge from facility.    Therapy recommendation(s):    Continued therapy is recommended.  Rationale/Recommendations:  home care for ADLs.

## 2024-04-26 NOTE — PROGRESS NOTES
Care Management Discharge Note    Discharge Date: 04/26/2024       Discharge Disposition: Assisted Living    Discharge Services: None    Discharge DME: None    Discharge Transportation:      Private pay costs discussed: transportation costs    Does the patient's insurance plan have a 3 day qualifying hospital stay waiver?  Yes     Which insurance plan 3 day waiver is available? Alternative insurance waiver    Will the waiver be used for post-acute placement? No    PAS Confirmation Code:  n/a  Patient/family educated on Medicare website which has current facility and service quality ratings: no    Education Provided on the Discharge Plan: Yes  Persons Notified of Discharge Plans: yes  Patient/Family in Agreement with the Plan: yes    Handoff Referral Completed: Yes    Additional Information:  11:04 AM  [facility]. Admissions did not receive the first referral. CMSW resent referral and waiting for intake to call me back. Pt going back to assisted living via w/c ride at 1:30pm. CM to follow regarding home care.     11:15 AM  Pt accepted for HC PT/OT through [facility].     RO Mcqueen

## 2024-04-26 NOTE — PLAN OF CARE
Problem: Gas Exchange Impaired  Goal: Optimal Gas Exchange  Outcome: Progressing  Intervention: Optimize Oxygenation and Ventilation  Recent Flowsheet Documentation  Taken 4/25/2024 2345 by Veronica Mansfield RN  Head of Bed (HOB) Positioning: HOB at 15 degrees  Taken 4/25/2024 2013 by Veronica Mansfield RN  Head of Bed (HOB) Positioning: HOB at 20-30 degrees     Problem: Activity Intolerance  Goal: Enhanced Capacity and Energy  Outcome: Progressing  Intervention: Optimize Activity Tolerance  Recent Flowsheet Documentation  Taken 4/25/2024 2345 by Veronica Mansfield RN  Activity Management:   activity adjusted per tolerance   ambulated to bathroom   back to bed  Environmental Support:   rest periods encouraged   personal routine supported   environmental consistency promoted   distractions minimized   comfort object encouraged  Taken 4/25/2024 2013 by Veronica Mansfield RN  Activity Management: activity adjusted per tolerance  Environmental Support:   rest periods encouraged   personal routine supported   environmental consistency promoted   distractions minimized   comfort object encouraged     Problem: Heart Failure  Goal: Effective Breathing Pattern During Sleep  Outcome: Progressing  Intervention: Monitor and Manage Obstructive Sleep Apnea  Recent Flowsheet Documentation  Taken 4/25/2024 2345 by Veronica Mansfield RN  Medication Review/Management: medications reviewed  Taken 4/25/2024 2013 by Veronica Mansfield RN  Medication Review/Management: medications reviewed     Problem: Pain Chronic (Persistent)  Goal: Optimal Pain Control and Function  Outcome: Progressing  Intervention: Develop Pain Management Plan  Recent Flowsheet Documentation  Taken 4/25/2024 2345 by Veronica Mansfield RN  Pain Management Interventions:   emotional support   rest   repositioned  Taken 4/25/2024 2013 by Veronica Mansfield RN  Pain Management Interventions:   care clustered   relaxation techniques promoted   repositioned   rest   heat applied  Intervention: Manage  Persistent Pain  Recent Flowsheet Documentation  Taken 4/25/2024 2345 by Veronica Mansfield RN  Sleep/Rest Enhancement:   awakenings minimized   comfort measures   consistent schedule promoted   noise level reduced   regular sleep/rest pattern promoted   relaxation techniques promoted   room darkened  Medication Review/Management: medications reviewed  Taken 4/25/2024 2013 by Veronica Mansfield RN  Sleep/Rest Enhancement:   awakenings minimized   comfort measures   consistent schedule promoted   noise level reduced   regular sleep/rest pattern promoted   relaxation techniques promoted   room darkened  Medication Review/Management: medications reviewed  Intervention: Optimize Psychosocial Wellbeing  Recent Flowsheet Documentation  Taken 4/25/2024 2345 by Veronica Mansfield, RN  Supportive Measures:   active listening utilized   self-reflection promoted   self-care encouraged   self-responsibility promoted  Taken 4/25/2024 2013 by Veronica Mansfield RN  Supportive Measures:   active listening utilized   self-reflection promoted   self-care encouraged   self-responsibility promoted   Goal Outcome Evaluation:  A&O x4, able to make needs known. VSS on RA, elevated BP. Pain rated 5/10 neck and back. Pain controlled with PRN Tylenol. Right PIV SL. Lung sounds diminished. Denied SOB or chest pain. Has CEVALLOS. Tender BLE. +1 BLE edema. Has generalized weakness. Voiding spontaneously. No BM during shift. 2g N a diet, tolerating well. Stand by assist with walker and gait belt. Overnight sleep study completed. Discharge pending.

## 2024-04-26 NOTE — PLAN OF CARE
Patient alert and oriented. Up ambulating in room with assist. Denies chest pain or shortness of breath. VSS. LS diminished. Tolerating diet. Voiding spontaneously. Plan discharged back to [facility] via wheelchair ride.   Problem: Adult Inpatient Plan of Care  Goal: Readiness for Transition of Care  Outcome: Adequate for Care Transition

## 2024-04-26 NOTE — PROGRESS NOTES
RENAL PROGRESS NOTE    REASON FOR CONSULT: solitary kidney with MARITA-diuresing      PLAN:  Renal function improved, back on daily diuretic  Ok to discharge from a renal perspective  We can f/up on her anemia, RF, volume and BP in clinic; appt Friday, May 17th at 9 a.m. with ANDREIA Godfrey.   Renal signing off.        ASSESSMENT/PLAN:  MARITA on Chronic kidney disease stage 3b/4 -   RESOLVED and back to her baseline   History of recurrent AKIs.   baseline sCR on admission, 1.7-->2.0 with diuresis, and recent HoTN   CKD Secondary to reduced nephron mass following nephrectomy in 2019.   Historically ~0.5 g proteinuria, paraprotein workup negative.   Harvey UA this admittion without RBCs or proteinuria  Baseline  sCR in the 1.6-1.8 range, eGFR in 30-35ml/min range.   and avoidance of NSAIDs.  New onset urine retention over the last few days, had kimble, passed void trial,  prior h/o stones, asymptomatic, L kidney unremarkable by US 4/23     Urine retention:  New finding, reports frequent low volume voids followed by large incont episodes when standing, consider urology eval outpt if recurs, s/p successful void trial     Anemia -   Hemoglobin labile but typically in 10-11 range.  Now 9's  Prior Epo level in 2023 was inappropriately low, retic count low for degree of anemia,  B12 and folate were ok in the past, iron saturation borderlin low, She is not yet on JERZY therapy, 10K 4/24; single dose IV iron, start oral iron if tolerated.      Volume -   Looks euvolemic on daily ow dose Torsemide,  Stable ECHO     Wt gain:  Wt currently stable,  Notable that pt is about 40lbs heavier than her outpt clinic wt in 7/2023; she reports being very sedentary and no change in eating habits and believes this to be soft tissues gains and not fluid, denies edema/SOB outpt     CEVALLOS:  Admit BNP and trops ok, chest imaging unremarkable, cards suggesting deconditioning as likely etiol, pt is better since admission  ECHO mostly stable (see  "above)   Cards following      HTN:  BP well controlled,  Pt reports Tiffanie PTA and LTC staff had been holding her BB at times. Would Metop be better in this situation? If Tiffanie recurs,   ECHO this admission  LVEF 65-70%. Some LVH  mild aortic stenosis is now present.    ALIYAH:  Uses CPAP at home, req intermittent low dose 02 here      Hyperlipidemia -   On statin     H/o nephrolithiasis -   Follows with [facility]  No acute s/s      CKD/MBD:  On Vit D     Hypothyroidism -   On levothyroxine     H/o gout:  On Allopurinol 100mg BID, favor lower renal dosing, to daily     H/o DVT/PE:  On Eliquis BID    SUBJECTIVE:  feeling ok, supposed to go home today (to LT), no edema, denies SOB, says she slept poorly d/t \"those studies\", labs ordered for this a.m. not resulted during my visit.  I have her set up for outpt neph f/up next month    OBJECTIVE:  Physical Exam   Temp: 97.8  F (36.6  C) Temp src: Oral BP: 119/59 Pulse: 69   Resp: 18 SpO2: 92 % O2 Device: None (Room air)    Vitals:    24 0508 24 0615 24 0100   Weight: 85.5 kg (188 lb 9.6 oz) 85.5 kg (188 lb 7.9 oz) 85.2 kg (187 lb 13.3 oz)     Vital Signs with Ranges  Temp:  [97.4  F (36.3  C)-98.6  F (37  C)] 97.8  F (36.6  C)  Pulse:  [68-92] 69  Resp:  [16-18] 18  BP: (110-140)/(54-67) 119/59  SpO2:  [92 %-96 %] 92 %  I/O last 3 completed shifts:  In: 1160 [P.O.:1160]  Out: 100 [Urine:100]    Temp (24hrs), Av  F (36.7  C), Min:97.7  F (36.5  C), Max:98.4  F (36.9  C)      Patient Vitals for the past 72 hrs:   Weight   24 0100 85.2 kg (187 lb 13.3 oz)   24 0615 85.5 kg (188 lb 7.9 oz)     Intake/Output Summary (Last 24 hours) at 2024 0930  Last data filed at 2024 0900  Gross per 24 hour   Intake 480 ml   Output 1275 ml   Net -795 ml       PHYSICAL EXAM:  General - Alert and oriented x3, appears comfortable, NAD, tired appearance (I woke her up) ; [place]  Cardiovascular - Rate controlled, -140s  Respiratory - Clear to " auscultationn on RA, current sats acceptable >90  Abd: BS present, no guarding or pain with palpation, no ascites  Extremities - No obvious lower extremity edema bilaterally  Skin: dry, intact, no rash, good turgor  Neuro:  Grossly intact, no focal deficits  MSK:  Grossly intact by cursory bed exam   Psych:  flat affect    LABORATORY STUDIES:     Recent Labs   Lab 04/22/24  0626 04/21/24 2207   WBC 7.8 7.6   RBC 2.97* 2.79*   HGB 9.8* 9.3*   HCT 29.5* 27.9*    150       Basic Metabolic Panel:  Recent Labs   Lab 04/24/24  0445 04/23/24  0419 04/22/24  0626 04/21/24 2207   * 139 140 138   POTASSIUM 3.7 3.5 3.6 3.8   CHLORIDE 96* 99 100 102   CO2 25 26 28 25   BUN 42.7* 40.7* 26.4* 29.3*   CR 1.90* 2.00* 1.70* 1.84*   GLC 80 84 89 100*   SRINIVAS 9.1 8.9 9.4 8.9       INR  Recent Labs   Lab 04/21/24 2207   INR 1.27*        Recent Labs   Lab Test 04/22/24  0626 04/21/24  2207 09/18/21  0708 09/17/21  1527   INR  --  1.27*  --  1.17*   WBC 7.8 7.6   < > 4.7   HGB 9.8* 9.3*   < > 11.5*    150   < > 130*    < > = values in this interval not displayed.       Personally reviewed current labs      Caitlyn Ramirez, FNP-BC  [facility]  [phone]

## 2024-04-29 ENCOUNTER — PATIENT OUTREACH (OUTPATIENT)
Dept: CARE COORDINATION | Facility: CLINIC | Age: 84
End: 2024-04-29
Payer: MEDICARE

## 2024-04-29 NOTE — LETTER
[facility] CARE COORDINATION  [facility]    April 29, 2024    Sandy Spencer  [address]  [place] [address]    Dear Sandy,  Your Care Team congratulates you on your journey to maintain wellness. This document will help guide you on your journey to maintain a healthy lifestyle.  You can use this to help you overcome any barriers you may encounter.  If you should have any questions or concerns, you can contact the members of your Care Team or contact your Primary Care Clinic for assistance.     Health Maintenance  Health Maintenance Reviewed:      My Access Plan  Medical Emergency 911   Primary Clinic Line [facility] - [phone]   24 Hour Appointment Line [phone] or  [phone]) (toll-free)   24 Hour Nurse Line [phone] (toll-free)   Preferred Urgent Care     Preferred Hospital     Preferred Pharmacy [address] [facility] Crisis Line The National Suicide Prevention Lifeline at 1-953.590.7570 or 911     My Care Team Members  Patient Care Team         Relationship Specialty Notifications Start End    Caitlyn Rees MD PCP - General   7/29/15     Phone: [phone] Fax: [phone] [address] [facility] [address]    Caitlyn Rees MD Assigned PCP   6/16/21     Phone: [phone] Fax: [phone] [address] [facility] [address]    Luz Elena Ybarra MD Assigned Heart and Vascular Provider   7/16/21     Phone: [phone] Fax: [phone]         [facility] [address] [facility] [address]    Magali Garrison, SLP Speech Language Pathologist Speech Language/Path  6/27/22     Phone: [phone] [address] [facility] [address]    Christine Bennett PA-C Referring Physician Neurology  6/27/22     [facility] referral.    Phone: [phone] Fax: [phone]         [address] [facility]  [place] [address]    Luz Elena Ybarra MD MD Cardiovascular Disease  2/21/23     Phone: [phone] Fax: [phone]         [address] [facility] [address] [facility] [address]    Rudolph Gamez PsyD Assigned Sleep Provider   3/4/23     Phone: [phone] Fax: [phone]         [address] [facility] [address] [facility] [address]    Rochelle Weber LSW Lead Care Coordinator Primary Care - CC Admissions 7/19/23 4/29/24    Phone: [phone]         Christine Bennett PA-C Assigned Neuroscience Provider   9/9/23     Phone: [phone] Fax: [phone]         [address] [facility] [address]    Tory Saleem APRN CNP Assigned Pulmonology Provider   1/25/24     Phone: [phone] Fax: [phone] [address] [facility] [address] [facility] [address]    Elise Butler [facility] Assigned MTM Pharmacist   3/15/24     Phone: [phone] Fax: [phone]         [facility] [address] [facility] [address]              Advance Care Plans/Directives Type:      Thank you for providing us with your Advance Directive. We will keep this on file and recommend that it be updated every 2 years, if your health situation changes, if there is a death of one of your health care agents, and/or if there are changes in your marital status.    It has been your Clinic Care Team's pleasure to work with you on accomplishing your goals.    Regards,  Your Clinic Care Team

## 2024-04-29 NOTE — PROGRESS NOTES
"Clinic Care Coordination Contact  [facility]: Post-Discharge Note  SITUATION                                                      Admission:    Admission Date: 04/21/24   Reason for Admission: urinary retention  Discharge:   Discharge Date: 04/26/24  Discharge Diagnosis: fluid overload    BACKGROUND                                                      Per hospital discharge summary and inpatient provider notes:  Discharge Diagnoses      Sandy Spencer is a 83 year old female with past medical history of ADD, anemia, anxiety, CKD stage III, chronic pain syndrome, chronic pancreatitis, depression, hypothyroidism, PE, PVD status post left CEA, history of nephrectomy, TIA, ulcerative colitis who presented with acute bladder discomfort and urinary retention. Found to be fluid overloaded.   Clinically Significant Risk Factors     # Obesity: Estimated body mass index is 33.27 kg/m  as calculated from the following:    Height as of this encounter: 1.6 m (5' 3\").    Weight as of this encounter: 85.2 kg (187 lb 13.3 oz).             Follow-ups Needed After Discharge  Follow-up Appointments     Follow Up and recommended labs and tests      Follow up with Nursing home physician.    Follow up with nephrology   Follow up with your cardiologist- ECHO in 2 years        Follow-up and recommended labs and tests       Kidney, anemia, and blood pressure follow-up     Friday, May 17th at 9 a.m. with ANDREIA Godfrey        ASSESSMENT      Enrollment  Outreach Frequency: monthly, more frequently as needed    Discharge Assessment  How are you doing now that you are home?: ok  How are your symptoms? (Red Flag symptoms escalate to triage hotline per guidelines): Improved  Do you feel your condition is stable enough to be safe at home until your provider visit?: Yes  Does the patient have their discharge instructions? : Yes  Does the patient have questions regarding their discharge instructions? : No  Discharge follow-up appointment " scheduled within 14 calendar days? : No  Is patient agreeable to assistance with scheduling? : No         Post-op (Clinicians Only)  Did the patient have surgery or a procedure: No  Eating & Drinking: eating and drinking without complaints/concerns  Urinary Status: voiding without complaint/concerns    Call back from daughter.  Mom is doing ok, but is having incontinence of bladder and bowel. She doesn't want to wear pullups.  She is hiring more help at her assisted living to do more frequent checks.  She may have to move to the care center.  They will be reassessing her needs soon.   A director at the assisted living told Viola that her mom may benefit from a . She didn't know about this service. Writer is also unaware of this type of support and how it could help patient. She will follow up with director, but Viola is unsure if this is an appropriate service for her mom at this time.       Mom is fearful, but is very mean to Viola.  She is declining quickly.  [place] have the money to pay for her care at this time.      Viola is ready to graduate from care coordination and will call if she has any other concerns.      PLAN                                                      Outpatient Plan:  Sending graduation letter.  No further outreaches planned.     Future Appointments   Date Time Provider Department Center   12/2/2024  2:00 PM [facility]   12/13/2024  1:30 PM Caroline Sumner NP [facility]         For any urgent concerns, please contact our 24 hour nurse triage line: [phone] ([phone])         SARTHAK Greenwood  [facility]/Voicemail    Clinical Data: Care Coordinator Outreach    Outreach Documentation Number of Outreach Attempt   4/29/2024  11:31 AM 1       Left message on  daughter Viola's  voicemail with call back information and requested return call.    Plan: Care Coordinator will try to reach patient again in 1-2 business days.    Rochelle Weber    [facility]  [phone]

## 2024-05-03 ENCOUNTER — TELEPHONE (OUTPATIENT)
Dept: FAMILY MEDICINE | Facility: CLINIC | Age: 84
End: 2024-05-03

## 2024-05-03 NOTE — TELEPHONE ENCOUNTER
Reason for Call:  Home Health Care    Romana with [facility] called regarding (reason for call): Update    Orders are needed for this patient.   Non admission to Home Care today 05/03/2024 due to not being homebound referring to outpatient therapy with teodora with in assistant living.    Pt Provider: Dr. Rees     Phone Number Homecare Nurse can be reached at: [phone]    Can we leave a detailed message on this number? YES    Phone number patient can be reached at: Other phone number:  [phone]    Best Time: any    Call taken on 5/3/2024 at 10:41 AM by Nelida Buchanan CNA

## 2024-07-07 ENCOUNTER — HEALTH MAINTENANCE LETTER (OUTPATIENT)
Age: 84
End: 2024-07-07

## 2024-07-16 ENCOUNTER — HOSPITAL ENCOUNTER (OUTPATIENT)
Dept: RADIOLOGY | Facility: CLINIC | Age: 84
Discharge: HOME | End: 2024-07-16
Attending: REGISTERED NURSE | Admitting: REGISTERED NURSE
Payer: MEDICARE

## 2024-07-16 DIAGNOSIS — R09.89 OTHER SPECIFIED SYMPTOMS AND SIGNS INVOLVING THE CIRCULATORY AND RESPIRATORY SYSTEMS: ICD-10-CM

## 2024-07-16 PROCEDURE — 74220 X-RAY XM ESOPHAGUS 1CNTRST: CPT

## 2024-08-01 NOTE — PATIENT INSTRUCTIONS
PCP: Kiley Polanco APRN - NP      Future Appointments   Date Time Provider Department Center   9/19/2024  3:00 PM Kiley Polanco APRN - NP [facility] PLAN:    Resume lamotrigine for pain 25 mg 1 in the morning for 2 weeks, 1 twice a day 2 weeks, 2 in the morning and 1 at bedtime for 2 weeks, then 2 twice a day.    Continue the zonisamide 25 mg 2 at bedtime.    Follow-up with Dr. Lynn in the office in 2 months

## 2024-08-14 ENCOUNTER — LAB REQUISITION (OUTPATIENT)
Dept: LAB | Facility: CLINIC | Age: 84
End: 2024-08-14
Payer: MEDICARE

## 2024-08-14 DIAGNOSIS — R82.90 UNSPECIFIED ABNORMAL FINDINGS IN URINE: ICD-10-CM

## 2024-08-14 DIAGNOSIS — R56.9 UNSPECIFIED CONVULSIONS (H): ICD-10-CM

## 2024-08-16 LAB — VALPROATE SERPL-MCNC: 71.1 UG/ML

## 2024-08-16 PROCEDURE — P9604 ONE-WAY ALLOW PRORATED TRIP: HCPCS | Mod: ORL | Performed by: REGISTERED NURSE

## 2024-08-16 PROCEDURE — 36415 COLL VENOUS BLD VENIPUNCTURE: CPT | Mod: ORL | Performed by: REGISTERED NURSE

## 2024-08-16 PROCEDURE — 80164 ASSAY DIPROPYLACETIC ACD TOT: CPT | Mod: ORL | Performed by: REGISTERED NURSE

## 2024-08-20 ENCOUNTER — LAB REQUISITION (OUTPATIENT)
Dept: LAB | Facility: CLINIC | Age: 84
End: 2024-08-20
Payer: MEDICARE

## 2024-08-20 DIAGNOSIS — I10 ESSENTIAL (PRIMARY) HYPERTENSION: ICD-10-CM

## 2024-08-20 DIAGNOSIS — E03.9 HYPOTHYROIDISM, UNSPECIFIED: ICD-10-CM

## 2024-08-20 DIAGNOSIS — E78.00 PURE HYPERCHOLESTEROLEMIA, UNSPECIFIED: ICD-10-CM

## 2024-08-21 LAB
ALBUMIN SERPL BCG-MCNC: 3.5 G/DL (ref 3.5–5.2)
ALP SERPL-CCNC: 54 U/L (ref 40–150)
ALT SERPL W P-5'-P-CCNC: <5 U/L (ref 0–50)
ANION GAP SERPL CALCULATED.3IONS-SCNC: 11 MMOL/L (ref 7–15)
AST SERPL W P-5'-P-CCNC: 19 U/L (ref 0–45)
BILIRUB SERPL-MCNC: 0.2 MG/DL
BUN SERPL-MCNC: 27.2 MG/DL (ref 8–23)
CALCIUM SERPL-MCNC: 8.9 MG/DL (ref 8.8–10.4)
CHLORIDE SERPL-SCNC: 99 MMOL/L (ref 98–107)
CHOLEST SERPL-MCNC: 166 MG/DL
CREAT SERPL-MCNC: 1.63 MG/DL (ref 0.51–0.95)
EGFRCR SERPLBLD CKD-EPI 2021: 31 ML/MIN/1.73M2
ERYTHROCYTE [DISTWIDTH] IN BLOOD BY AUTOMATED COUNT: 15.1 % (ref 10–15)
FASTING STATUS PATIENT QL REPORTED: NORMAL
GLUCOSE SERPL-MCNC: 103 MG/DL (ref 70–99)
HCO3 SERPL-SCNC: 26 MMOL/L (ref 22–29)
HCT VFR BLD AUTO: 30.9 % (ref 35–47)
HDLC SERPL-MCNC: 67 MG/DL
HGB BLD-MCNC: 9.7 G/DL (ref 11.7–15.7)
LDLC SERPL CALC-MCNC: 86 MG/DL
MCH RBC QN AUTO: 34.2 PG (ref 26.5–33)
MCHC RBC AUTO-ENTMCNC: 31.4 G/DL (ref 31.5–36.5)
MCV RBC AUTO: 109 FL (ref 78–100)
NONHDLC SERPL-MCNC: 99 MG/DL
PLATELET # BLD AUTO: 132 10E3/UL (ref 150–450)
POTASSIUM SERPL-SCNC: 4.1 MMOL/L (ref 3.4–5.3)
PROT SERPL-MCNC: 5.6 G/DL (ref 6.4–8.3)
RBC # BLD AUTO: 2.84 10E6/UL (ref 3.8–5.2)
SODIUM SERPL-SCNC: 136 MMOL/L (ref 135–145)
T4 FREE SERPL-MCNC: 0.95 NG/DL (ref 0.9–1.7)
TRIGL SERPL-MCNC: 65 MG/DL
TSH SERPL DL<=0.005 MIU/L-ACNC: 6.73 UIU/ML (ref 0.3–4.2)
WBC # BLD AUTO: 4.5 10E3/UL (ref 4–11)

## 2024-08-21 PROCEDURE — 85027 COMPLETE CBC AUTOMATED: CPT | Mod: ORL | Performed by: REGISTERED NURSE

## 2024-08-21 PROCEDURE — 36415 COLL VENOUS BLD VENIPUNCTURE: CPT | Mod: ORL | Performed by: REGISTERED NURSE

## 2024-08-21 PROCEDURE — 84443 ASSAY THYROID STIM HORMONE: CPT | Mod: ORL | Performed by: REGISTERED NURSE

## 2024-08-21 PROCEDURE — 80061 LIPID PANEL: CPT | Mod: ORL | Performed by: REGISTERED NURSE

## 2024-08-21 PROCEDURE — P9604 ONE-WAY ALLOW PRORATED TRIP: HCPCS | Mod: ORL | Performed by: REGISTERED NURSE

## 2024-08-21 PROCEDURE — 84439 ASSAY OF FREE THYROXINE: CPT | Mod: ORL | Performed by: REGISTERED NURSE

## 2024-08-21 PROCEDURE — 80053 COMPREHEN METABOLIC PANEL: CPT | Mod: ORL | Performed by: REGISTERED NURSE

## 2024-08-29 ENCOUNTER — DOCUMENTATION ONLY (OUTPATIENT)
Dept: GERIATRICS | Facility: CLINIC | Age: 84
End: 2024-08-29
Payer: MEDICARE

## 2024-08-29 VITALS
SYSTOLIC BLOOD PRESSURE: 134 MMHG | TEMPERATURE: 97.6 F | DIASTOLIC BLOOD PRESSURE: 79 MMHG | RESPIRATION RATE: 17 BRPM | HEIGHT: 63 IN | OXYGEN SATURATION: 97 % | HEART RATE: 79 BPM | BODY MASS INDEX: 33.66 KG/M2 | WEIGHT: 190 LBS

## 2024-08-29 PROBLEM — G40.909 EPILEPTIC SEIZURE (H): Status: ACTIVE | Noted: 2024-08-29

## 2024-08-29 PROBLEM — Z86.718 PERSONAL HISTORY OF OTHER VENOUS THROMBOSIS AND EMBOLISM: Status: ACTIVE | Noted: 2024-02-02
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
## 2024-08-29 PROBLEM — M10.9 GOUT, UNSPECIFIED: Status: ACTIVE | Noted: 2024-02-02

## 2024-08-29 PROBLEM — Z86.718 HISTORY OF RECURRENT DEEP VEIN THROMBOSIS (DVT): Status: ACTIVE | Noted: 2024-08-29

## 2024-08-29 PROBLEM — S62.115D CLOSED NONDISPLACED FRACTURE OF TRIQUETRUM OF LEFT WRIST WITH ROUTINE HEALING: Status: ACTIVE | Noted: 2024-08-25

## 2024-08-29 PROBLEM — N17.9 ACUTE RENAL FAILURE SUPERIMPOSED ON STAGE 3B CHRONIC KIDNEY DISEASE (H): Status: ACTIVE | Noted: 2024-02-02

## 2024-08-29 PROBLEM — Z91.81 HISTORY OF FALLING: Status: ACTIVE | Noted: 2024-02-02

## 2024-08-29 PROBLEM — Z91.199 PATIENT'S NONCOMPLIANCE WITH OTHER MEDICAL TREATMENT AND REGIMEN DUE TO UNSPECIFIED REASON: Status: ACTIVE | Noted: 2024-02-02

## 2024-08-29 PROBLEM — N18.32 ACUTE RENAL FAILURE SUPERIMPOSED ON STAGE 3B CHRONIC KIDNEY DISEASE (H): Status: ACTIVE | Noted: 2024-02-02

## 2024-08-29 RX ORDER — NYSTATIN 100000 [USP'U]/G
POWDER TOPICAL
COMMUNITY

## 2024-08-29 RX ORDER — TRIAMCINOLONE ACETONIDE 1 MG/G
CREAM TOPICAL 2 TIMES DAILY
COMMUNITY

## 2024-08-30 ENCOUNTER — NURSING HOME VISIT (OUTPATIENT)
Dept: GERIATRICS | Facility: CLINIC | Age: 84
End: 2024-08-30
Payer: MEDICARE

## 2024-08-30 DIAGNOSIS — M62.81 MUSCLE WEAKNESS (GENERALIZED): ICD-10-CM

## 2024-08-30 DIAGNOSIS — Z51.5 HOSPICE CARE PATIENT: ICD-10-CM

## 2024-08-30 DIAGNOSIS — S62.115D CLOSED NONDISPLACED FRACTURE OF TRIQUETRUM OF LEFT WRIST WITH ROUTINE HEALING: Primary | ICD-10-CM

## 2024-08-30 DIAGNOSIS — N18.4 CKD (CHRONIC KIDNEY DISEASE) STAGE 4, GFR 15-29 ML/MIN (H): ICD-10-CM

## 2024-08-30 DIAGNOSIS — R29.6 FALLS FREQUENTLY: ICD-10-CM

## 2024-08-30 DIAGNOSIS — F06.4 ANXIETY DISORDER DUE TO MEDICAL CONDITION: ICD-10-CM

## 2024-08-30 DIAGNOSIS — F31.2 BIPOLAR DISORDER, CURRENT EPISODE MANIC SEVERE WITH PSYCHOTIC FEATURES (H): ICD-10-CM

## 2024-08-30 DIAGNOSIS — M62.81 GENERALIZED MUSCLE WEAKNESS: ICD-10-CM

## 2024-08-30 DIAGNOSIS — I10 PRIMARY HYPERTENSION: ICD-10-CM

## 2024-08-30 DIAGNOSIS — G40.909 EPILEPTIC SEIZURE (H): ICD-10-CM

## 2024-08-30 PROCEDURE — 99310 SBSQ NF CARE HIGH MDM 45: CPT | Mod: GV | Performed by: NURSE PRACTITIONER

## 2024-08-30 NOTE — LETTER
8/30/2024      Sandy Spencer  [address] [address]        No notes on file      Sincerely,        Polly Lozada, CNP
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
## 2024-09-03 ENCOUNTER — NURSING HOME VISIT (OUTPATIENT)
Dept: GERIATRICS | Facility: CLINIC | Age: 84
End: 2024-09-03
Payer: OTHER MISCELLANEOUS

## 2024-09-03 VITALS
OXYGEN SATURATION: 97 % | HEIGHT: 63 IN | BODY MASS INDEX: 37.03 KG/M2 | WEIGHT: 209 LBS | DIASTOLIC BLOOD PRESSURE: 64 MMHG | TEMPERATURE: 97.3 F | SYSTOLIC BLOOD PRESSURE: 108 MMHG | HEART RATE: 82 BPM | RESPIRATION RATE: 18 BRPM

## 2024-09-03 DIAGNOSIS — R29.6 FALLS FREQUENTLY: Primary | ICD-10-CM

## 2024-09-03 DIAGNOSIS — F31.2 BIPOLAR DISORDER, CURRENT EPISODE MANIC SEVERE WITH PSYCHOTIC FEATURES (H): ICD-10-CM

## 2024-09-03 DIAGNOSIS — G90.523 COMPLEX REGIONAL PAIN SYNDROME TYPE 1 OF BOTH LOWER EXTREMITIES: ICD-10-CM

## 2024-09-03 DIAGNOSIS — E66.812 CLASS 2 SEVERE OBESITY DUE TO EXCESS CALORIES WITH SERIOUS COMORBIDITY AND BODY MASS INDEX (BMI) OF 37.0 TO 37.9 IN ADULT (H): ICD-10-CM

## 2024-09-03 DIAGNOSIS — Z79.01 LONG TERM CURRENT USE OF ANTICOAGULANT THERAPY: ICD-10-CM

## 2024-09-03 DIAGNOSIS — E66.01 CLASS 2 SEVERE OBESITY DUE TO EXCESS CALORIES WITH SERIOUS COMORBIDITY AND BODY MASS INDEX (BMI) OF 37.0 TO 37.9 IN ADULT (H): ICD-10-CM

## 2024-09-03 DIAGNOSIS — N18.4 CKD (CHRONIC KIDNEY DISEASE) STAGE 4, GFR 15-29 ML/MIN (H): ICD-10-CM

## 2024-09-03 DIAGNOSIS — S62.115D CLOSED NONDISPLACED FRACTURE OF TRIQUETRUM OF LEFT WRIST WITH ROUTINE HEALING: ICD-10-CM

## 2024-09-03 DIAGNOSIS — G40.909 EPILEPTIC SEIZURE (H): ICD-10-CM

## 2024-09-03 DIAGNOSIS — Z51.5 HOSPICE CARE PATIENT: ICD-10-CM

## 2024-09-03 PROCEDURE — 99306 1ST NF CARE HIGH MDM 50: CPT | Performed by: FAMILY MEDICINE
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
## 2024-09-06 ENCOUNTER — LAB REQUISITION (OUTPATIENT)
Dept: LAB | Facility: CLINIC | Age: 84
End: 2024-09-06

## 2024-09-06 DIAGNOSIS — R82.90 UNSPECIFIED ABNORMAL FINDINGS IN URINE: ICD-10-CM

## 2024-09-06 DIAGNOSIS — N39.0 URINARY TRACT INFECTION, SITE NOT SPECIFIED: ICD-10-CM

## 2024-09-06 LAB
ALBUMIN UR-MCNC: 30 MG/DL
APPEARANCE UR: CLEAR
BILIRUB UR QL STRIP: NEGATIVE
COLOR UR AUTO: ABNORMAL
GLUCOSE UR STRIP-MCNC: NEGATIVE MG/DL
HGB UR QL STRIP: ABNORMAL
KETONES UR STRIP-MCNC: NEGATIVE MG/DL
LEUKOCYTE ESTERASE UR QL STRIP: ABNORMAL
NITRATE UR QL: NEGATIVE
PH UR STRIP: 6 [PH] (ref 5–7)
RBC URINE: 21 /HPF
SP GR UR STRIP: 1.03 (ref 1–1.03)
SQUAMOUS EPITHELIAL: 2 /HPF
UROBILINOGEN UR STRIP-MCNC: NORMAL MG/DL
WBC URINE: 10 /HPF

## 2024-09-06 PROCEDURE — 81001 URINALYSIS AUTO W/SCOPE: CPT | Performed by: FAMILY MEDICINE

## 2024-09-12 ENCOUNTER — NURSING HOME VISIT (OUTPATIENT)
Dept: GERIATRICS | Facility: CLINIC | Age: 84
End: 2024-09-12
Payer: OTHER MISCELLANEOUS

## 2024-09-12 VITALS
TEMPERATURE: 97.3 F | DIASTOLIC BLOOD PRESSURE: 64 MMHG | HEIGHT: 63 IN | SYSTOLIC BLOOD PRESSURE: 108 MMHG | WEIGHT: 208 LBS | HEART RATE: 82 BPM | RESPIRATION RATE: 18 BRPM | BODY MASS INDEX: 36.86 KG/M2 | OXYGEN SATURATION: 97 %

## 2024-09-12 DIAGNOSIS — F06.4 ANXIETY DISORDER DUE TO MEDICAL CONDITION: ICD-10-CM

## 2024-09-12 DIAGNOSIS — Z51.5 HOSPICE CARE PATIENT: ICD-10-CM

## 2024-09-12 DIAGNOSIS — E66.01 CLASS 2 SEVERE OBESITY DUE TO EXCESS CALORIES WITH SERIOUS COMORBIDITY AND BODY MASS INDEX (BMI) OF 37.0 TO 37.9 IN ADULT (H): ICD-10-CM

## 2024-09-12 DIAGNOSIS — G40.909 EPILEPTIC SEIZURE (H): ICD-10-CM

## 2024-09-12 DIAGNOSIS — N18.4 CKD (CHRONIC KIDNEY DISEASE) STAGE 4, GFR 15-29 ML/MIN (H): ICD-10-CM

## 2024-09-12 DIAGNOSIS — E66.812 CLASS 2 SEVERE OBESITY DUE TO EXCESS CALORIES WITH SERIOUS COMORBIDITY AND BODY MASS INDEX (BMI) OF 37.0 TO 37.9 IN ADULT (H): ICD-10-CM

## 2024-09-12 DIAGNOSIS — Z79.01 LONG TERM CURRENT USE OF ANTICOAGULANT THERAPY: ICD-10-CM

## 2024-09-12 DIAGNOSIS — R29.6 FALLS FREQUENTLY: ICD-10-CM

## 2024-09-12 DIAGNOSIS — S62.115D CLOSED NONDISPLACED FRACTURE OF TRIQUETRUM OF LEFT WRIST WITH ROUTINE HEALING: ICD-10-CM

## 2024-09-12 DIAGNOSIS — F31.2 BIPOLAR DISORDER, CURRENT EPISODE MANIC SEVERE WITH PSYCHOTIC FEATURES (H): Primary | ICD-10-CM

## 2024-09-12 DIAGNOSIS — G90.523 COMPLEX REGIONAL PAIN SYNDROME TYPE 1 OF BOTH LOWER EXTREMITIES: ICD-10-CM

## 2024-09-12 PROCEDURE — 99310 SBSQ NF CARE HIGH MDM 45: CPT | Performed by: FAMILY MEDICINE
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
## 2024-09-17 ENCOUNTER — DOCUMENTATION ONLY (OUTPATIENT)
Dept: GERIATRICS | Facility: CLINIC | Age: 84
End: 2024-09-17
Payer: MEDICARE

## 2024-09-17 NOTE — PROGRESS NOTES
Geriatrics Notification of Patient Death    Provider: Anusha Painting MD  Place of death: [facility]   Facility Type:  LTC    Caller: Margot  Date of Death: 9/17/24 @ 0243    Patient was on Hospice care: Yes; please explain: [facility]  Patient was seen by [facility] Geriatrics provider: Yes; please explain: 9/12/24 by Dr. Garima Jiménez, RN

## 2024-11-26 ENCOUNTER — PATIENT OUTREACH (OUTPATIENT)
Dept: FAMILY MEDICINE | Facility: CLINIC | Age: 84
End: 2024-11-26
Payer: MEDICARE

## 2024-11-26 NOTE — TELEPHONE ENCOUNTER
Patient Quality Outreach    Patient is due for the following:   Physical Annual Wellness Visit    Action(s) Taken:   Schedule a Annual Wellness Visit    Type of outreach:    Sent [employer] message.    Questions for provider review:    None           Marcela Farias MA

## (undated) DEVICE — SUCTION MANIFOLD NEPTUNE 2 SYS 1 PORT 702-025-000

## (undated) DEVICE — FORCEP BIOPSY DISP 000386

## (undated) DEVICE — SOL WATER IRRIG 1000ML BOTTLE 2F7114

## (undated) DEVICE — TUBING SUCTION MEDI-VAC 1/4"X20' N620A - HE